# Patient Record
Sex: FEMALE | Race: WHITE
[De-identification: names, ages, dates, MRNs, and addresses within clinical notes are randomized per-mention and may not be internally consistent; named-entity substitution may affect disease eponyms.]

---

## 2017-03-12 ENCOUNTER — HOSPITAL ENCOUNTER (EMERGENCY)
Dept: HOSPITAL 75 - ER | Age: 77
Discharge: HOME | End: 2017-03-12
Payer: MEDICARE

## 2017-03-12 VITALS — HEIGHT: 62 IN | WEIGHT: 200 LBS | BODY MASS INDEX: 36.8 KG/M2

## 2017-03-12 VITALS — DIASTOLIC BLOOD PRESSURE: 79 MMHG | SYSTOLIC BLOOD PRESSURE: 167 MMHG

## 2017-03-12 DIAGNOSIS — S00.83XA: ICD-10-CM

## 2017-03-12 DIAGNOSIS — E66.9: ICD-10-CM

## 2017-03-12 DIAGNOSIS — S20.311A: Primary | ICD-10-CM
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
## 2017-03-12 DIAGNOSIS — Z95.1: ICD-10-CM

## 2017-03-12 DIAGNOSIS — K57.30: ICD-10-CM

## 2017-03-12 DIAGNOSIS — S20.312A: ICD-10-CM

## 2017-03-12 DIAGNOSIS — I25.2: ICD-10-CM

## 2017-03-12 DIAGNOSIS — K44.9: ICD-10-CM

## 2017-03-12 DIAGNOSIS — Z79.82: ICD-10-CM

## 2017-03-12 DIAGNOSIS — M47.812: ICD-10-CM

## 2017-03-12 DIAGNOSIS — Z79.899: ICD-10-CM

## 2017-03-12 DIAGNOSIS — I25.10: ICD-10-CM

## 2017-03-12 DIAGNOSIS — I10: ICD-10-CM

## 2017-03-12 LAB
ALBUMIN SERPL-MCNC: 3.9 G/DL (ref 3.2–4.5)
ALT SERPL-CCNC: 12 U/L (ref 0–55)
AMYLASE SERPL-CCNC: 36 U/L (ref 25–125)
ANION GAP SERPL CALC-SCNC: 9 MMOL/L (ref 5–14)
APTT BLD: 25 SEC (ref 24–35)
AST SERPL-CCNC: 20 U/L (ref 5–34)
BASOPHILS # BLD AUTO: 0 10^3/UL (ref 0–0.1)
BASOPHILS NFR BLD AUTO: 0 % (ref 0–10)
BILIRUB SERPL-MCNC: 0.3 MG/DL (ref 0.1–1)
BUN SERPL-MCNC: 25 MG/DL (ref 7–18)
BUN/CREAT SERPL: 22
CALCIUM SERPL-MCNC: 9.5 MG/DL (ref 8.5–10.1)
CHLORIDE SERPL-SCNC: 112 MMOL/L (ref 98–107)
CK SERPL-CCNC: 48 U/L (ref 29–168)
CO2 SERPL-SCNC: 21 MMOL/L (ref 21–32)
CREAT SERPL-MCNC: 1.14 MG/DL (ref 0.6–1.3)
EOSINOPHIL # BLD AUTO: 0.2 10^3/UL (ref 0–0.3)
EOSINOPHIL NFR BLD AUTO: 3 % (ref 0–10)
ERYTHROCYTE [DISTWIDTH] IN BLOOD BY AUTOMATED COUNT: 13.7 % (ref 10–14.5)
GFR SERPLBLD BASED ON 1.73 SQ M-ARVRAT: 46 ML/MIN
GLUCOSE SERPL-MCNC: 109 MG/DL (ref 70–105)
INR PPP: 1 (ref 0.8–1.4)
LYMPHOCYTES # BLD AUTO: 3 X 10^3 (ref 1–4)
LYMPHOCYTES NFR BLD AUTO: 42 % (ref 12–44)
MAGNESIUM SERPL-MCNC: 2.2 MG/DL (ref 1.8–2.4)
MCH RBC QN AUTO: 30 PG (ref 25–34)
MCHC RBC AUTO-ENTMCNC: 34 G/DL (ref 32–36)
MCV RBC AUTO: 90 FL (ref 80–99)
MONOCYTES # BLD AUTO: 0.5 X 10^3 (ref 0–1)
MONOCYTES NFR BLD AUTO: 8 % (ref 0–12)
NEUTROPHILS # BLD AUTO: 3.2 X 10^3 (ref 1.8–7.8)
NEUTROPHILS NFR BLD AUTO: 46 % (ref 42–75)
PLATELET # BLD: 170 10^3/UL (ref 130–400)
PMV BLD AUTO: 11.4 FL (ref 7.4–10.4)
POTASSIUM SERPL-SCNC: 4.3 MMOL/L (ref 3.6–5)
PROT SERPL-MCNC: 6.3 G/DL (ref 6.4–8.2)
PROTHROMBIN TIME: 13.1 SEC (ref 12.2–14.7)
RBC # BLD AUTO: 4.23 10^6/UL (ref 4.35–5.85)
SODIUM SERPL-SCNC: 142 MMOL/L (ref 135–145)
TROPONIN I SERPL-MCNC: < 0.3 NG/ML (ref ?–0.3)
WBC # BLD AUTO: 7 10^3/UL (ref 4.3–11)

## 2017-03-12 PROCEDURE — 74177 CT ABD & PELVIS W/CONTRAST: CPT

## 2017-03-12 PROCEDURE — 70486 CT MAXILLOFACIAL W/O DYE: CPT

## 2017-03-12 PROCEDURE — 71260 CT THORAX DX C+: CPT

## 2017-03-12 PROCEDURE — 96375 TX/PRO/DX INJ NEW DRUG ADDON: CPT

## 2017-03-12 PROCEDURE — 36415 COLL VENOUS BLD VENIPUNCTURE: CPT

## 2017-03-12 PROCEDURE — 82553 CREATINE MB FRACTION: CPT

## 2017-03-12 PROCEDURE — 84484 ASSAY OF TROPONIN QUANT: CPT

## 2017-03-12 PROCEDURE — 93041 RHYTHM ECG TRACING: CPT

## 2017-03-12 PROCEDURE — 80053 COMPREHEN METABOLIC PANEL: CPT

## 2017-03-12 PROCEDURE — 83735 ASSAY OF MAGNESIUM: CPT

## 2017-03-12 PROCEDURE — 73030 X-RAY EXAM OF SHOULDER: CPT

## 2017-03-12 PROCEDURE — 96361 HYDRATE IV INFUSION ADD-ON: CPT

## 2017-03-12 PROCEDURE — 96376 TX/PRO/DX INJ SAME DRUG ADON: CPT

## 2017-03-12 PROCEDURE — 96374 THER/PROPH/DIAG INJ IV PUSH: CPT

## 2017-03-12 PROCEDURE — 85730 THROMBOPLASTIN TIME PARTIAL: CPT

## 2017-03-12 PROCEDURE — 70450 CT HEAD/BRAIN W/O DYE: CPT

## 2017-03-12 PROCEDURE — 85025 COMPLETE CBC W/AUTO DIFF WBC: CPT

## 2017-03-12 PROCEDURE — 85610 PROTHROMBIN TIME: CPT

## 2017-03-12 PROCEDURE — 93005 ELECTROCARDIOGRAM TRACING: CPT

## 2017-03-12 PROCEDURE — 82550 ASSAY OF CK (CPK): CPT

## 2017-03-12 PROCEDURE — 82150 ASSAY OF AMYLASE: CPT

## 2017-03-12 PROCEDURE — 71010: CPT

## 2017-03-12 PROCEDURE — 72125 CT NECK SPINE W/O DYE: CPT

## 2017-03-12 NOTE — DIAGNOSTIC IMAGING REPORT
INDICATION: Fall with right shoulder pain.



DISCUSSION: Three views of the right shoulder were obtained, no

comparison. Moderate degenerative changes are noted within the

acromioclavicular joint. Mild degenerative changes are present

within the glenohumeral joint. Alignment is anatomic. No

fracture or dislocation. Soft tissues are unremarkable.



IMPRESSION:

1. No acute abnormality identified within the right shoulder.



Dictated by:



Dictated on workstation # [id number]

## 2017-03-12 NOTE — ED FALL/INJURY
General


Chief Complaint:  Trauma-Non Activation


Stated Complaint:  FALL/COLLAR BONE INJ/R SIDE PAIN


Nursing Triage Note:  


AMBULATED TO ROOM 07 ET STATES SHE TRIPPED IN HER BEDROOM CAUSING HER TO FALL.  


HIT HER CHEST ON THE DRESSER CORNER ET ALSO HIT HER CHIN ON THE DRESSER.


Source:  patient





History of Present Illness


Time seen by provider:  16:20


Initial Comments


PT ARRIVES VIA POV FROM HOME


PT STATES SHE TRIPPED AND FELL IN HER BEDROOM, AND HIT CHEST ON THE CORNER OF 

THE DRESSER, AND ALSO HIT CHIN ON DRESSER


OCCURRED TODAY AT 1430


NO LOSS OF CONSCIOUSNESS


NO DIZZINESS


NO VISION CHANGES


NO PARESTHESIAS OR MOTOR DEFICITS


NO NAUSEA//VOMITING


NO ABDOMINAL PAIN 


CHEST HURTS TO MOVE


NO SHORTNESS OF BREATH


NO NECK OR BACK PAIN 


NO DENTAL/MOUTH PAIN 


C/O CHIN, CHEST AND RIGHT SHOULDER PAIN





PCP: DR. HUMPHRIES--SEEN LAST WEEK FOR ROUTINE EXAM AND RIGHT BREAST PAIN X 1 

MONTH--MAMMOGRAM NEXT WEDNESDAY





Allergies and Home Medications


Allergies


Coded Allergies:  


     quinine (Verified  Allergy, Unknown, 9/12/06)





Home Medications


Acetaminophen 500 Mg Tablet 500 MG PO HS (Reported) 


Aspirin 325 Mg Tablet 81 MG PO DAILY (Reported) 


Ca Cmb No.1/Vit D3/B-6/Fa/B12 1 Each Tablet 1 EACH PO DAILY (Reported) 


Melatonin/Pyridoxine 1 Each Tablet 1 EACH PO HS (Reported) 


Metoprolol Succinate/Hctz 1 Each Tab.er.24h 1 EACH PO DAILY (Reported) 


Multivitamins 1 Tab Tablet 1 TAB PO DAILY (Reported) 


Omega 3 Polyunsat Fatty Acids 1,000 Mg Cap 1 TAB PO DAILY (Reported) 


Omeprazole 20 Mg Capsule.dr 40 MG PO DAILY (Reported) 


Simvastatin 40 Mg Tablet 1 TAB PO HS (Reported) 


Tramadol HCl 50 Mg Tablet #20 50 MG PO Q4H 


   Prescribed by: CLAUDETTE RIDER on 3/12/17 1824





Constitutional:   no symptoms reported


Eyes:   No Symptoms Reported


Ears, Nose, Mouth, Throat:   no symptoms reported


Respiratory:   no symptoms reported


Cardiovascular:   see HPI


Gastrointestinal:   no symptoms reported


Genitourinary:   no symptoms reported


Musculoskeletal:   see HPI


Skin:   other (ABRASION TO CHEST)


Psychiatric/Neurological:   No Symptoms Reported





Past Medical-Social-Family Hx


Patient Social History


Alcohol Use:  Denies Use


Recreational Drug Use:  No


Smoking Status:  Never a Smoker


Recent Foreign Travel:  No


Contact w/Someone Who Travel:  No


Recent Infectious Disease Expo:  No


Recent Hopitalizations:  No





Immunizations Up To Date


Date of Pneumonia Vaccine:  Dec 6, 2010


Date of Influenza Vaccine:  Oct 10, 2014





Surgeries


HX Surgeries:  Yes (HIATAL HERNIA REPAIR, RIGHT KNEE REPLACEMENT, COLONOSCOPY; 

CABG 2007; HYST/BSO)


Surgeries:  Abdominal, Appendectomy, Cardiac, CABG, Gallbladder, Hysterectomy, 

Joint Replacement, Orthopedic





Respiratory


Hx Respiratory Disorders:  No





Cardiovascular


Hx Cardiac Disorders:  Yes


Cardiac Disorders:  Coronary Artery Disease, Heart Attack, High Cholesterol, 

Hypertension, Peripheral Vascular





Neurological


Hx Neurological Disorders:  No





Reproductive System


Hx Reproductive Disorders:  No


Sexually Transmitted Disease:  No


GYN History:  Hysterectomy, Menopausal





Genitourinary


Hx Genitourinary Disorders:  Yes


Genitourinary Disorders:  UTI-Chronic





Gastrointestinal


Hx Gastrointestinal Disorders:  Yes (SPASTIC COLON/HIATAL HERNIA/GI BLEED-ULCERS

)


Gastrointestinal Disorders:  Gastroesophageal Reflux, Gastrointestinal Bleed, 

Hiatal Hernia, Ulcer





Musculoskeletal


Hx Musculoskeletal Disorders:  Yes (RIGHT KNEE REPLACEMENT)


Musculoskeletal Disorders:  Arthritis





Endocrine


Hx Endocrine Disorders:  Yes ("DIET CONTROLLED", OBESITY)


Endocrine Disorders:  Diabetes, Non-Insulin dep





HEENT


HX ENT Disorders:  No





Cancer


Hx Cancer:  No





Psychosocial


Hx Psychiatric Problems:  Yes


Behavioral Health Disorders:  Anxiety





Integumentary


HX Skin/Integumentary Disorder:  No





Blood Transfusions


Hx Blood Disorders:  No





Physical Exam


Vital Signs





 Vital Sign - Last 12Hours








 3/12/17





 16:09


 


Temp 98.0


 


Pulse 65


 


Resp 16


 


B/P 173/87


 


Pulse Ox 98





Capillary Refill : Less Than 3 Seconds


General Appearance:   WD/WN


HEENT:   PERRL/EOMI other (BRUISING AND TENDERNESS TO CHIN. NO TRISMUS. NO 

INTRAORAL INJURY)


Neck:   other (PLACED IN C-COLLAR ON ARRIVAL)


Cardiovascular:   regular rate, rhythm no murmur


Respiratory:   normal breath sounds no respiratory distress no accessory muscle 

use other (ABRASION AND BRUSING TO CENTER OF UPPER CHEST WITH TENDERNESS)


Gastrointestinal:   normal bowel sounds non tender soft no organomegaly


Extremities:   no pedal edema no calf tenderness normal capillary refill other (

TENDERNESS TO ANTERIOR RIGHT SHOULDER. )


Neurologic/Psychiatric:   CNs II-XII nml as tested no motor/sensory deficits 

alert normal mood/affect oriented x 3


Skin:   normal color warm/dry





Chidi Coma Score


Best Eye Response:  (4) Open Spontaneously


Best Verbal Response:  (5) Oriented


Best Motor Response:  (6) Obeys Commands


Kingsley Total:  15





Splinting and Joint Reduction :  


   Cervical Collars:  Nemo-Adult





Progress/Results/Core Measures


Results/Orders


Lab Results





 Laboratory Tests








Test


  3/12/17


16:48 Range/Units


 


 


Activated Partial


Thromboplast Time 25 


  24-35  SEC


 


 


Alanine Aminotransferase


(ALT/SGPT) 12 


  0-55  U/L


 


 


Albumin 3.9  3.2-4.5  G/DL


 


Alkaline Phosphatase 69    U/L


 


Amylase Level 36    U/L


 


Anion Gap 9  5-14  MMOL/L


 


Aspartate Amino Transf


(AST/SGOT) 20 


  5-34  U/L


 


 


BUN/Creatinine Ratio 22   


 


Basophils # (Auto)


  0.0 


  0.0-0.1


10^3/uL


 


Basophils (%) (Auto) 0  0-10  %


 


Blood Urea Nitrogen 25 H 7-18  MG/DL


 


Calcium Level 9.5  8.5-10.1  MG/DL


 


Carbon Dioxide Level 21  21-32  MMOL/L


 


Chloride Level 112 H   MMOL/L


 


Creatine Kinase MB 1.3  <6.6  NG/ML


 


Creatinine


  1.14 


  0.60-1.30


MG/DL


 


Eosinophils # (Auto)


  0.2 


  0.0-0.3


10^3/uL


 


Eosinophils (%) (Auto) 3  0-10  %


 


Estimat Glomerular Filtration


Rate 46 


   


 


 


Glucose Level 109 H   MG/DL


 


Hematocrit 38  35-52  %


 


Hemoglobin 12.8  11.5-16.0  G/DL


 


INR Comment 1.0  0.8-1.4  


 


Lymphocytes # (Auto) 3.0  1.0-4.0  X 10^3


 


Lymphocytes (%) (Auto) 42  12-44  %


 


Magnesium Level 2.2  1.8-2.4  MG/DL


 


Mean Corpuscular Hemoglobin 30  25-34  PG


 


Mean Corpuscular Hemoglobin


Concent 34 


  32-36  G/DL


 


 


Mean Corpuscular Volume 90  80-99  FL


 


Mean Platelet Volume 11.4 H 7.4-10.4  FL


 


Monocytes # (Auto) 0.5  0.0-1.0  X 10^3


 


Monocytes (%) (Auto) 8  0-12  %


 


Neutrophils # (Auto) 3.2  1.8-7.8  X 10^3


 


Neutrophils (%) (Auto) 46  42-75  %


 


Platelet Count


  170 


  130-400


10^3/uL


 


Potassium Level 4.3  3.6-5.0  MMOL/L


 


Prothrombin Time 13.1  12.2-14.7  SEC


 


Red Blood Count


  4.23 L


  4.35-5.85


10^6/uL


 


Red Cell Distribution Width 13.7  10.0-14.5  %


 


Sodium Level 142  135-145  MMOL/L


 


Total Bilirubin 0.3  0.1-1.0  MG/DL


 


Total Creatine Kinase 48    U/L


 


Total Protein 6.3 L 6.4-8.2  G/DL


 


Troponin I < 0.30  <0.30  NG/ML


 


White Blood Count


  7.0 


  4.3-11.0


10^3/uL








My Orders





 Orders-CLAUDETTE RIDER DO


Saline Lock/Iv-Start (3/12/17 16:22)


Ekg Tracing (3/12/17 16:22)


Monitor-Rhythm Ecg Trace Only (3/12/17 16:22)


Ct Head/Face/Cervical Wo (3/12/17 16:22)


Amylase (3/12/17 16:22)


Cbc With Automated Diff (3/12/17 16:22)


Comprehensive Metabolic Panel (3/12/17 16:22)


Creatine Kinase (3/12/17 16:22)


Creatine Kinase Mb (3/12/17 16:22)


Magnesium (3/12/17 16:22)


Protime With Inr (3/12/17 16:22)


Partial Thromboplastin Time (3/12/17 16:22)


Troponin I (3/12/17 16:22)


Cervical Collar (3/12/17 16:22)


Chest 1 View, Ap/Pa Only (3/12/17 16:22)


Ct Chest/Abdomen/Pelvis W (3/12/17 16:22)


Fentanyl  Injection (Sublimaze Injection (3/12/17 16:31)


Shoulder, Right, 3 Views (3/12/17 16:33)


Iohexol Injection (Omnipaque 350 Mg/Ml 1 (3/12/17 17:15)


Ns (Ivpb) (Sodium Chloride 0.9% Ivpb Bag (3/12/17 17:15)


Saline Lock/Iv-Start (3/12/17 17:56)


Ns Iv 1000 Ml (Sodium Chloride 0.9%) (3/12/17 17:56)


Ketorolac Injection (Toradol Injection) (3/12/17 18:30)


Fentanyl  Injection (Sublimaze Injection (3/12/17 18:21)





Medications Given in ED





 Current Medications








 Medications  Dose


 Ordered  Sig/Geraldine


 Route  Start Time


 Stop Time Status Last Admin


Dose Admin


 


 Iohexol  100 ml  ONCE  ONCE


 IV  3/12/17 17:15


 3/12/17 17:16 DC 3/12/17 17:36


75 ML


 


 Sodium Chloride  1,000 ml @ 


 0 mls/hr  Q0M ONCE


 IV  3/12/17 17:56


 3/12/17 17:57 DC 3/12/17 18:25


1,000 MLS/HR


 


 Sodium Chloride


 100 ml  100 ml  ONCE  ONCE


 IV  3/12/17 17:15


 3/12/17 17:16 DC 3/12/17 17:36


80 ML








Vital Signs/I&O





 Vital Sign - Last 12Hours








 3/12/17





 16:09


 


Temp 98.0


 


Pulse 65


 


Resp 16


 


B/P 173/87


 


Pulse Ox 98








Blood Pressure Mean:  115


Progress Note :  


Progress Note


1820--CERVICAL COLLAR REMOVED AFTER CT RESULTS OBTAINED. PT DOES NOT C/O NECK 

PAIN AND NECK IS NON-TENDER





ECG


Initial ECG Impression Time:  15:15


Initial ECG Rate:  64


Initial ECG Rhythm:  Normal Sinus


Initial ECG Impression:  Normal


Initial ECG Comparisson:  Unchanged





Diagnostic Imaging





Comments


CT HEAD/MAXILLOFACIALS/CERVICAL SPINE--NO ACUTE PROCESS, CHRONIC CHANGES


CT CHEST/ABDOMEN/PELVIS--NO ACUTE PROCESS


CXR--NO ACUTE PROCESS


XRAYS RIGHT SHOULDER--NO ACUTE PROCESS


ALL PER RADIOLOGIST REPORTS @ 1812


   Reviewed:  Reviewed by Me





Departure


Impression


Impression:  


 Primary Impression:  


 ANTERIOR CHEST CONTUSION


 Additional Impressions:  


 Chin contusion


 Status post fall


Disposition:  01 HOME, SELF-CARE


Condition:  Stable





Departure-Patient Inst.


Referrals:  


MERNA HUMPHRIES DO (PCP/Family)


Primary Care Physician


Patient Instructions:  CHEST CONTUSION, Contusion (DC), Preventing Falls in the 

Older Adult





Add. Discharge Instructions:


ICE TO SORE AREAS AT 20 MINUTE INTERVALS





ACTIVITIES AS TOLERATED





TYLENOL 1 GRAM /MOTRIN 800 MG 4 TIMES A DAY FOR PAIN





FOLLOW UP WITH YOUR DR IN 1 WEEK IF NO BETTER





All discharge instructions reviewed with patient and/or family. Voiced 

understanding.


Scripts


Tramadol HCl (Ultram)50 Mg Affdws45 Mg PO Q4H #20 TAB


   Prov:CLAUDETTE RIDER DO         3/12/17








CLAUDETTE RIDER DO Mar 12, 2017 16:37

## 2017-03-12 NOTE — DIAGNOSTIC IMAGING REPORT
Procedure: CT chest, abdomen, and pelvis with contrast.



Technique: Multiple contiguous axial images were obtained

through the chest, abdomen, and pelvis after the administration

of intravenous contrast.



Indication: Fall with generalized body pain.



Comparison: None.



Discussion: Subsegmental atelectasis is present within the

bilateral lung bases. No focal consolidation or pulmonary

nodule. No pneumothorax. Normal heart size. No pleural or

pericardial fluid. The thoracic aorta is normal in caliber and

configuration. The pulmonary arteries are normal in caliber.

Median sternotomy is present. No mediastinal, hilar or axillary

adenopathy. No acute osseous abnormality is identified.



Small hiatal hernia. The gallbladder is likely surgically

absent. The liver, pancreas, spleen, adrenal glands, kidneys and

urinary bladder are unremarkable. Uterus is surgically absent.

Mild diverticulosis with no secondary evidence for

diverticulitis. Mild constipation. No obstruction, pneumatosis

or pneumoperitoneum. The abdominal aorta is normal in caliber.

No ascites or pathologically enlarged lymph nodes are

identified. No acute osseous abnormality identified.



Impression:

1. No acute abnormality identified within the chest.

2. Mild constipation.

3. Mild diverticulosis.



Dictated by:



Dictated on workstation # [id number]

## 2017-03-12 NOTE — XMS REPORT
[facility]

 Created on: 2016



Leilani Ramírez

External Reference #: [id number]

: 1940

Sex: Female



Demographics







 Address  [address] [place]  [address]

 

 Home Phone  [phone]

 

 Preferred Language  Unknown

 

 Marital Status  Unknown

 

 [employer] Affiliation  Unknown

 

 Race  White

 

 Ethnic Group  Not  or 





Author







 ROOPA Robles

 

 [facility]  [employer]

 

 Address  Unknown

 

 Phone  Unavailable







Care Team Providers







 Care Team Member Name  Role  Phone

 

 ROOPA HEMPHILL  CP  Unavailable



                                                                



Allergies

          No Known Allergies                                                   
                                     



Problems

          No Known Problems                                                    
                                    



Medications

          No Known Medications                                                 
                             



Results

          No Known Results                                                     
               



Summary Purpose

          eClinical[employer] Submission

## 2017-03-12 NOTE — DIAGNOSTIC IMAGING REPORT
Procedure: CT head, face, and cervical spine without contrast.



Technique: Multiple contiguous axial images were obtained

through the head, neck, and facial bones without the use of

intravenous contrast. Sagittal and coronal reformations through

the cervical spine and facial bones were also performed.



Indication: Patient fell and hit chin on a dresser, head and

neck pain with bruising on the chin.



Comparison: 9/19/2013.



Discussion: Chronic appearing lacunar infarct within the right

basal ganglia. No acute intracranial hemorrhage, mass, midline

shift or hydrocephalus. The ventricles and sulci are normal size

and configuration for age. The patient is edentulous. No acute

facial fracture identified. The visualized orbits, paranasal

sinuses, mastoid air cells and calvarium are unremarkable.



Moderate degenerative changes are noted diffusely throughout the

cervical spine. No acute fracture or subluxation. The visualized

paraspinal soft tissues are unremarkable.



Impression:

1. Senescent intracranial changes, as described. No acute

intracranial abnormality identified.

2. No acute facial fracture identified.

3. Degenerative changes of the cervical spine. No acute fracture

identified.



Dictated by:



Dictated on workstation # [id number]

## 2017-03-24 ENCOUNTER — HOSPITAL ENCOUNTER (OUTPATIENT)
Dept: HOSPITAL 75 - RAD | Age: 77
End: 2017-03-24
Attending: FAMILY MEDICINE
Payer: MEDICARE

## 2017-03-24 DIAGNOSIS — N64.4: Primary | ICD-10-CM

## 2017-03-24 PROCEDURE — 77066 DX MAMMO INCL CAD BI: CPT

## 2017-03-24 NOTE — DIAGNOSTIC IMAGING REPORT
EXAMINATION: Bilateral breast digital diagnostic mammogram with

CAD.



The current study was also evaluated with a Computer Aided

Detection (CAD) system.



INDICATION: Bilateral medial breast pain.



COMPARISON: 4/15/13.



FINDINGS: The breasts are composed of scattered fibroglandular

densities. There are benign-appearing calcifications seen.



No mass, architectural distortion or suspicious calcification

seen.



IMPRESSION: No mammographic evidence of malignancy. Ultrasound

evaluation pending.



ACR BI-RADS Category 0: Incomplete. (Needs additional imaging

evaluation).

Result letter will be mailed to the patient.

Note: At least 10% of breast cancer is not imaged by mammography.



Dictated by:



Dictated on workstation # [id number]

## 2017-03-24 NOTE — DIAGNOSTIC IMAGING REPORT
EXAMINATION: Bilateral breast ultrasound.



INDICATION: Bilateral breast pain in the medial aspect of each

breast.



FINDINGS: The four quadrants and retroareolar region of each

breast were scanned with no abnormality seen.



IMPRESSION: Negative study. Clinical followup of areas of pain

recommended.



ACR BI-RADS Category 1: Negative.

Result letter will be mailed to the patient.

Note:  At least 10% of breast cancer is not imaged by

mammography.



Dictated by:



Dictated on workstation # [id number]

## 2017-08-10 ENCOUNTER — HOSPITAL ENCOUNTER (OUTPATIENT)
Dept: HOSPITAL 75 - REHAB | Age: 77
Discharge: HOME | End: 2017-08-10
Attending: FAMILY MEDICINE
Payer: MEDICARE

## 2017-08-10 DIAGNOSIS — M51.36: Primary | ICD-10-CM

## 2018-05-05 ENCOUNTER — HOSPITAL ENCOUNTER (OUTPATIENT)
Dept: HOSPITAL 75 - RAD | Age: 78
End: 2018-05-05
Attending: FAMILY MEDICINE
Payer: MEDICARE

## 2018-05-05 DIAGNOSIS — M50.322: ICD-10-CM

## 2018-05-05 DIAGNOSIS — M48.02: Primary | ICD-10-CM

## 2018-05-05 DIAGNOSIS — M99.71: ICD-10-CM

## 2018-05-05 PROCEDURE — 72141 MRI NECK SPINE W/O DYE: CPT

## 2018-05-05 NOTE — DIAGNOSTIC IMAGING REPORT
PROCEDURE: MR imaging cervical spine without contrast.



TECHNIQUE: Multiplanar, multisequence MR imaging of the cervical

spine was performed without contrast.



INDICATION: Neck pain with right arm radiculopathy.



COMPARISON: None available.



FINDINGS: Normal alignment of the cervical spine. No fracture or

marrow replacing process. No features of active facet synovitis.

The cervical cord is normal in size and signal. The cerebellar

tonsils are normal in position. No cervical lymphadenopathy.



C2-C3: No spinal stenosis or foraminal narrowing.



C3-C4: No spinal stenosis. Left uncovertebral joint hypertrophy

results in moderate to severe left foraminal narrowing.



C4-C5: No spinal stenosis. Right uncovertebral joint hypertrophy

and facet osteoarthritis results in moderate right foraminal

narrowing.



C5-C6: Central and bilateral paracentral disc osteophyte complex

with ligamentum flavum thickening results in mild spinal stenosis

without mass effect on the cord. Moderate left and mild right

foraminal narrowing due to facet and uncovertebral joint

hypertrophy.



C6-C7: Central and bilateral paracentral disc osteophyte complex

results in minimal spinal stenosis. No significant foraminal

narrowing.



C7-T1: No spinal stenosis or foraminal narrowing.



IMPRESSION:

1. Mild degenerative disc disease and posterior element

hypertrophy at C5-C6 causes mild spinal stenosis. No additional

foci of significant spinal canal narrowing.

2. Varying degrees of neuroforaminal narrowing are present and

greatest on the left at C3-C4 with severe stenosis.



Dictated by: 



  Dictated on workstation # [id number]

## 2018-07-02 ENCOUNTER — HOSPITAL ENCOUNTER (OUTPATIENT)
Dept: HOSPITAL 75 - REHAB | Age: 78
Discharge: HOME | End: 2018-07-02
Attending: NURSE PRACTITIONER
Payer: MEDICARE

## 2018-07-02 DIAGNOSIS — M19.90: Primary | ICD-10-CM

## 2018-07-02 DIAGNOSIS — M48.00: ICD-10-CM

## 2018-09-14 ENCOUNTER — HOSPITAL ENCOUNTER (EMERGENCY)
Dept: HOSPITAL 75 - ER | Age: 78
Discharge: HOME | End: 2018-09-14
Payer: MEDICARE

## 2018-09-14 VITALS — SYSTOLIC BLOOD PRESSURE: 158 MMHG | DIASTOLIC BLOOD PRESSURE: 67 MMHG

## 2018-09-14 VITALS — BODY MASS INDEX: 38.64 KG/M2 | WEIGHT: 210 LBS | HEIGHT: 62 IN

## 2018-09-14 DIAGNOSIS — Z98.890: ICD-10-CM

## 2018-09-14 DIAGNOSIS — E66.9: ICD-10-CM

## 2018-09-14 DIAGNOSIS — Z87.440: ICD-10-CM

## 2018-09-14 DIAGNOSIS — Z95.1: ICD-10-CM

## 2018-09-14 DIAGNOSIS — Z79.82: ICD-10-CM

## 2018-09-14 DIAGNOSIS — F41.9: ICD-10-CM

## 2018-09-14 DIAGNOSIS — I25.10: ICD-10-CM

## 2018-09-14 DIAGNOSIS — Z87.19: ICD-10-CM

## 2018-09-14 DIAGNOSIS — N39.0: ICD-10-CM

## 2018-09-14 DIAGNOSIS — K29.00: Primary | ICD-10-CM

## 2018-09-14 DIAGNOSIS — Z90.49: ICD-10-CM

## 2018-09-14 DIAGNOSIS — Z90.89: ICD-10-CM

## 2018-09-14 DIAGNOSIS — I10: ICD-10-CM

## 2018-09-14 DIAGNOSIS — I73.9: ICD-10-CM

## 2018-09-14 DIAGNOSIS — Z90.710: ICD-10-CM

## 2018-09-14 DIAGNOSIS — Z86.010: ICD-10-CM

## 2018-09-14 DIAGNOSIS — I25.2: ICD-10-CM

## 2018-09-14 DIAGNOSIS — E11.51: ICD-10-CM

## 2018-09-14 DIAGNOSIS — Z88.8: ICD-10-CM

## 2018-09-14 DIAGNOSIS — K21.9: ICD-10-CM

## 2018-09-14 DIAGNOSIS — E78.00: ICD-10-CM

## 2018-09-14 LAB
ALBUMIN SERPL-MCNC: 4.1 GM/DL (ref 3.2–4.5)
ALP SERPL-CCNC: 70 U/L (ref 40–136)
ALT SERPL-CCNC: 10 U/L (ref 0–55)
APTT PPP: YELLOW S
BACTERIA #/AREA URNS HPF: (no result) /HPF
BASOPHILS # BLD AUTO: 0 10^3/UL (ref 0–0.1)
BASOPHILS NFR BLD AUTO: 1 % (ref 0–10)
BILIRUB SERPL-MCNC: 0.4 MG/DL (ref 0.1–1)
BILIRUB UR QL STRIP: NEGATIVE
BUN/CREAT SERPL: 29
CALCIUM SERPL-MCNC: 9.8 MG/DL (ref 8.5–10.1)
CHLORIDE SERPL-SCNC: 107 MMOL/L (ref 98–107)
CO2 SERPL-SCNC: 20 MMOL/L (ref 21–32)
CREAT SERPL-MCNC: 0.9 MG/DL (ref 0.6–1.3)
EOSINOPHIL # BLD AUTO: 0.3 10^3/UL (ref 0–0.3)
EOSINOPHIL NFR BLD AUTO: 4 % (ref 0–10)
ERYTHROCYTE [DISTWIDTH] IN BLOOD BY AUTOMATED COUNT: 13 % (ref 10–14.5)
FIBRINOGEN PPP-MCNC: CLEAR MG/DL
GFR SERPLBLD BASED ON 1.73 SQ M-ARVRAT: 60 ML/MIN
GLUCOSE SERPL-MCNC: 73 MG/DL (ref 70–105)
GLUCOSE UR STRIP-MCNC: NEGATIVE MG/DL
HCT VFR BLD CALC: 38 % (ref 35–52)
HGB BLD-MCNC: 12.9 G/DL (ref 11.5–16)
KETONES UR QL STRIP: NEGATIVE
LEUKOCYTE ESTERASE UR QL STRIP: (no result)
LIPASE SERPL-CCNC: 51 U/L (ref 8–78)
LYMPHOCYTES # BLD AUTO: 3.5 X 10^3 (ref 1–4)
LYMPHOCYTES NFR BLD AUTO: 48 % (ref 12–44)
MANUAL DIFFERENTIAL PERFORMED BLD QL: NO
MCH RBC QN AUTO: 31 PG (ref 25–34)
MCHC RBC AUTO-ENTMCNC: 34 G/DL (ref 32–36)
MCV RBC AUTO: 91 FL (ref 80–99)
MONOCYTES # BLD AUTO: 0.7 X 10^3 (ref 0–1)
MONOCYTES NFR BLD AUTO: 9 % (ref 0–12)
NEUTROPHILS # BLD AUTO: 2.8 X 10^3 (ref 1.8–7.8)
NEUTROPHILS NFR BLD AUTO: 38 % (ref 42–75)
NITRITE UR QL STRIP: POSITIVE
PH UR STRIP: 6.5 [PH] (ref 5–9)
PLATELET # BLD: 164 10^3/UL (ref 130–400)
PMV BLD AUTO: 10.3 FL (ref 7.4–10.4)
POTASSIUM SERPL-SCNC: 3.8 MMOL/L (ref 3.6–5)
PROT SERPL-MCNC: 6.9 GM/DL (ref 6.4–8.2)
PROT UR QL STRIP: NEGATIVE
RBC # BLD AUTO: 4.16 10^6/UL (ref 4.35–5.85)
RBC #/AREA URNS HPF: (no result) /HPF
SODIUM SERPL-SCNC: 139 MMOL/L (ref 135–145)
SP GR UR STRIP: 1.01 (ref 1.02–1.02)
UROBILINOGEN UR-MCNC: NORMAL MG/DL
WBC # BLD AUTO: 7.2 10^3/UL (ref 4.3–11)
WBC #/AREA URNS HPF: (no result) /HPF

## 2018-09-14 PROCEDURE — 83690 ASSAY OF LIPASE: CPT

## 2018-09-14 PROCEDURE — 87186 SC STD MICRODIL/AGAR DIL: CPT

## 2018-09-14 PROCEDURE — 85025 COMPLETE CBC W/AUTO DIFF WBC: CPT

## 2018-09-14 PROCEDURE — 81000 URINALYSIS NONAUTO W/SCOPE: CPT

## 2018-09-14 PROCEDURE — 80053 COMPREHEN METABOLIC PANEL: CPT

## 2018-09-14 PROCEDURE — 87088 URINE BACTERIA CULTURE: CPT

## 2018-09-14 PROCEDURE — 36415 COLL VENOUS BLD VENIPUNCTURE: CPT

## 2018-09-14 PROCEDURE — 96374 THER/PROPH/DIAG INJ IV PUSH: CPT

## 2018-09-14 PROCEDURE — 87077 CULTURE AEROBIC IDENTIFY: CPT

## 2018-09-14 NOTE — ED ABDOMINAL PAIN
General


Chief Complaint:  Abdominal/GI Problems


Stated Complaint:  NOT FEELING WELL/ABD PAIN


Nursing Triage Note:  


Pt arrives to the ED c/o ABD Pain and "not feeling well".  Pt is +Nausea 


-Vomitting.  Last BM: 09/14.


Sepsis Screen:  No Definite Risk


Source of Information:  Patient, Family


Exam Limitations:  No Limitations


 (CAITIE ÁLVAREZ MD)





History of Present Illness


Date Seen by Provider:  Sep 14, 2018


Time Seen by Provider:  16:45


Initial Comments


This 78-year-old woman presents to the emergency room with complaints of 

worsening abdominal pain and nausea for the past one month.  She describes a 

burning sensation in the epigastric region.  She has been trying Tums, Gas-X, 

active the small, and proton pump inhibitors without significant relief.  She 

had previously been on omeprazole and was recently changed to Protonix.  She 

describes pain particularly after eating.  Sometimes this happens upon the end 

of her swallow and sometimes it is delayed.  She has frequent belching.  She is 

status post cholecystectomy.  She has had an EGD and colonoscopy in the 2015 

with Dr. Huizar which demonstrated a gastric polyp, diverticulosis, and right 

colon inflammation.  She also has a history of hiatal hernia status post repair 

by Dr. Cash.  She is currently wearing a cardiac monitor prescribed by 

Dr. Clements, her cardiologist.  Her primary care provider is Dr. Huizar.  She has 

had some nausea without vomiting.  She states it is very difficult for her to 

vomit since the hiatal hernia repair.  Patient also describes generally feeling 

shaky and weak, especially when getting up.


 (CAITIE ÁLVAREZ MD)





Allergies and Home Medications


Allergies


Coded Allergies:  


     quinine (Verified  Allergy, Unknown, 9/12/06)





Home Medications


Acetaminophen 500 Mg Tablet, 500 MG PO HS, (Reported)


Aspirin 325 Mg Tablet, 81 MG PO DAILY, (Reported)


Ca Cmb No.1/Vit D3/B-6/Fa/B12 1 Each Tablet, 1 EACH PO DAILY, (Reported)


Melatonin/Pyridoxine 1 Each Tablet, 1 EACH PO HS, (Reported)


Metoprolol Succinate/Hctz 1 Each Tab.er.24h, 1 EACH PO DAILY, (Reported)


Multivitamins 1 Tab Tablet, 1 TAB PO DAILY, (Reported)


Nitrofurantoin Macrocrystal 100 Mg Capsule, 100 MG PO BID


   Prescribed by: LAW RICO on 9/14/18 1959


Omega 3 Polyunsat Fatty Acids 1,000 Mg Cap, 1 TAB PO DAILY, (Reported)


Omeprazole 20 Mg Capsule.dr, 40 MG PO DAILY, (Reported)


Simvastatin 40 Mg Tablet, 1 TAB PO HS, (Reported)


Sucralfate 1 Gm Tablet, 1 GM PO QIDACHS


   Prescribed by: LAW RICO on 9/14/18 1935


Tramadol HCl 50 Mg Tablet, 50 MG PO Q4H


   Prescribed by: CLAUDETTE RIDER on 3/12/17 1824





Patient Home Medication List


Home Medication List Reviewed:  Yes


 (CAITEI ÁLVAREZ MD)


Home Medication List Reviewed:  Yes


 (LAW RICO)





Review of Systems


Review of Systems


Constitutional:  see HPI


EENTM:  No Symptoms Reported


Respiratory:  No Symptoms Reported


Cardiovascular:  See HPI


Gastrointestinal:  See HPI


Genitourinary:  No Symptoms Reported


Musculoskeletal:  no symptoms reported


Skin:  no symptoms reported


Psychiatric/Neurological:  See HPI


Endocrine:  No Symptoms Reported


Hematologic/Lymphatic:  No Symptoms Reported (CAITIE ÁLVAREZ MD)





Past Medical-Social-Family Hx


Past Med/Social Hx:  Reviewed and Corrections made


 (CAITIE ÁLVAREZ MD)





Patient Social History


Alcohol Use:  Denies Use


Recreational Drug Use:  No


Smoking Status:  Never a Smoker


2nd Hand Smoke Exposure:  No


Recent Foreign Travel:  No


Contact w/Someone Who Travel:  No


Recent Infectious Disease Expo:  No


Recent Hopitalizations:  No


Physical Abuse:  No


Sexual Abuse:  No


Mistreated:  No


Fear:  No


 (CAITIE ÁLVAREZ MD)





Immunizations Up To Date


Date of Pneumonia Vaccine:  Dec 6, 2010


Date of Influenza Vaccine:  Oct 10, 2014


 (CAITIE ÁLVAREZ MD)





Past Medical History


Surgeries:  Yes


Abdominal (hiatal hernia repair), Appendectomy, Cardiac, CABG, Gallbladder, 

Hysterectomy, Joint Replacement, Orthopedic


Respiratory:  No


Cardiac:  Yes


Coronary Artery Disease, Heart Attack, High Cholesterol, Hypertension, 

Peripheral Vascular


Neurological:  No


Reproductive Disorders:  No


GYN History:  Hysterectomy, Menopausal


Sexually Transmitted Disease:  No


UTI-Chronic


Gastrointestinal:  Yes (SPASTIC COLON/HIATAL HERNIA/GI BLEED-ULCERS, gastric 

polyp)


Gastroesophageal Reflux, Gastrointestinal Bleed, Diverticulosis, Hiatal Hernia, 

Ulcer


Musculoskeletal:  Yes (RIGHT KNEE REPLACEMENT)


Arthritis


Endocrine:  Yes ("DIET CONTROLLED", OBESITY)


Diabetes, Non-Insulin dep


HEENT:  No


Cancer:  No


Psychosocial:  Yes


Anxiety


Integumentary:  No


Blood Disorders:  No


 (CAITIE ÁLVAREZ MD)





Physical Exam


Vital Signs





Vital Signs - First Documented








 9/14/18





 16:35


 


Temp 98.2


 


Pulse 68


 


Resp 14


 


B/P (MAP) 193/83 (119)


 


Pulse Ox 96





 (TRISHA,LAW J)


Vital Signs


Capillary Refill : Less Than 3 Seconds 


 (CAITIE ÁLVAREZ MD)


Height/Weight/BMI


Height: 5'2.00"


Weight: 210lbs. 6.0oz. [id number];  BMI


Method:Stated


General Appearance:  WD/WN, no apparent distress


HEENT:  PERRL/EOMI, normal ENT inspection


Neck:  normal inspection


Respiratory:  chest non-tender, lungs clear, normal breath sounds, no 

respiratory distress, no accessory muscle use


Cardiovascular:  regular rate, rhythm, no edema, no murmur


Gastrointestinal:  normal bowel sounds, soft, tenderness (epigastric)


Extremities:  normal inspection


Neurologic/Psychiatric:  CNs II-XII nml as tested, no motor/sensory deficits, 

alert, normal mood/affect, oriented x 3


Skin:  normal color, warm/dry (CAITIE ÁLVAREZ MD)





Progress/Results/Core Measures


Results/Orders


Lab Results





Laboratory Tests








Test


 9/14/18


16:28 9/14/18


18:04 Range/Units


 


 


Urine Color YELLOW    


 


Urine Clarity CLEAR    


 


Urine pH 6.5   5-9  


 


Urine Specific Gravity 1.010 L  1.016-1.022  


 


Urine Protein NEGATIVE   NEGATIVE  


 


Urine Glucose (UA) NEGATIVE   NEGATIVE  


 


Urine Ketones NEGATIVE   NEGATIVE  


 


Urine Nitrite POSITIVE H  NEGATIVE  


 


Urine Bilirubin NEGATIVE   NEGATIVE  


 


Urine Urobilinogen NORMAL   NORMAL  MG/DL


 


Urine Leukocyte Esterase 2+ H  NEGATIVE  


 


Urine RBC (Auto) NEGATIVE   NEGATIVE  


 


Urine RBC NONE    /HPF


 


Urine WBC 25-50 H   /HPF


 


Urine Crystals NONE    /LPF


 


Urine Bacteria LARGE H   /HPF


 


Urine Casts NONE    /LPF


 


Urine Mucus NEGATIVE    /LPF


 


Urine Culture Indicated YES    


 


White Blood Count


 


 7.2 


 4.3-11.0


10^3/uL


 


Red Blood Count


 


 4.16 L


 4.35-5.85


10^6/uL


 


Hemoglobin  12.9  11.5-16.0  G/DL


 


Hematocrit  38  35-52  %


 


Mean Corpuscular Volume  91  80-99  FL


 


Mean Corpuscular Hemoglobin  31  25-34  PG


 


Mean Corpuscular Hemoglobin


Concent 


 34 


 32-36  G/DL





 


Red Cell Distribution Width  13.0  10.0-14.5  %


 


Platelet Count


 


 164 


 130-400


10^3/uL


 


Mean Platelet Volume  10.3  7.4-10.4  FL


 


Neutrophils (%) (Auto)  38 L 42-75  %


 


Lymphocytes (%) (Auto)  48 H 12-44  %


 


Monocytes (%) (Auto)  9  0-12  %


 


Eosinophils (%) (Auto)  4  0-10  %


 


Basophils (%) (Auto)  1  0-10  %


 


Neutrophils # (Auto)  2.8  1.8-7.8  X 10^3


 


Lymphocytes # (Auto)  3.5  1.0-4.0  X 10^3


 


Monocytes # (Auto)  0.7  0.0-1.0  X 10^3


 


Eosinophils # (Auto)


 


 0.3 


 0.0-0.3


10^3/uL


 


Basophils # (Auto)


 


 0.0 


 0.0-0.1


10^3/uL


 


Sodium Level  139  135-145  MMOL/L


 


Potassium Level  3.8  3.6-5.0  MMOL/L


 


Chloride Level  107    MMOL/L


 


Carbon Dioxide Level  20 L 21-32  MMOL/L


 


Anion Gap  12  5-14  MMOL/L


 


Blood Urea Nitrogen  26 H 7-18  MG/DL


 


Creatinine


 


 0.90 


 0.60-1.30


MG/DL


 


Estimat Glomerular Filtration


Rate 


 60 


  





 


BUN/Creatinine Ratio  29   


 


Glucose Level  73    MG/DL


 


Calcium Level  9.8  8.5-10.1  MG/DL


 


Corrected Calcium  9.7  8.5-10.1  MG/DL


 


Total Bilirubin  0.4  0.1-1.0  MG/DL


 


Aspartate Amino Transf


(AST/SGOT) 


 17 


 5-34  U/L





 


Alanine Aminotransferase


(ALT/SGPT) 


 10 


 0-55  U/L





 


Alkaline Phosphatase  70    U/L


 


Total Protein  6.9  6.4-8.2  GM/DL


 


Albumin  4.1  3.2-4.5  GM/DL


 


Lipase  51  8-78  U/L





 (LAW RICO)


My Orders





Orders - LAW RICO


Metoprolol Tartrate Injection (Lopressor (9/14/18 18:45)


 (LAW RICO)


Medications Given in ED





Current Medications








 Medications  Dose


 Ordered  Sig/Geraldine


 Route  Start Time


 Stop Time Status Last Admin


Dose Admin


 


 Al Hydrox/Mg


 Hydrox/Simethicone  30 ml  ONCE  ONCE


 PO  9/14/18 17:30


 9/14/18 17:31 DC 9/14/18 18:15


30 ML


 


 Lidocaine HCl  15 ml  ONCE  ONCE


 PO  9/14/18 17:30


 9/14/18 17:31 DC 9/14/18 18:15


15 ML


 


 Ondansetron HCl  4 mg  ONCE  ONCE


 IVP  9/14/18 17:30


 9/14/18 17:31 DC 9/14/18 18:15


4 MG





 (LAW RICO)


Vital Signs/I&O











 9/14/18





 16:35


 


Temp 98.2


 


Pulse 68


 


Resp 14


 


B/P (MAP) 193/83 (119)


 


Pulse Ox 96





 (LAW RICO)








Blood Pressure Mean:  119











Progress


Progress Note :  


   Time:  19:30


Progress Note


Assumed care of the patient at shift change and I have alerted visit with and 

taken history and examination of the patient which is consistent with the 

history and examination as documented by Dr. Lowery. Patient experienced 

relief from her symptoms within minutes of the GI cocktail. She was also given 

Zofran and is no longer having any nausea. She does have a history of stomach 

polyp and EGD in 2015 by Dr. Huizar and would probably be gandhi that she have 

another scope. We'll set her up on some antacids and Carafate. She recently 

switched from her omeprazole to pantoprazole and she does not think it's 

working as well.


 (LAW RICO)





Departure


Impression





 Primary Impression:  


 Acute gastritis without mention of hemorrhage


 Additional Impression:  


 Urinary tract infection


 Qualified Codes:  N30.00 - Acute cystitis without hematuria


Disposition:  01 HOME, SELF-CARE


Condition:  Improved





Departure-Patient Inst.


Decision time for Depature:  19:59


 (LAW RICO)


Referrals:  


MERNA DRIVER DO (PCP)


Primary Care Physician








JESUS HUIZAR DO


Patient Instructions:  Gastritis (DC)





Add. Discharge Instructions:  


Your workup seems to be consistent with an irritated lining of your stomach. 

This could be from peptic ulcer or other concern will be best discovered by EGD 

or a camera being placed in your stomach by Dr. Huizar. Keep your follow-up 

appointment with Dr. Driver on Tuesday. Continue taking the pantoprazole in 

addition to the next 2 weeks take Carafate 30 minutes prior to all meals and 

before bedtime. If you're still having breakthrough burning you can take a 

tablet of ranitidine 1-2 times daily.


Take the Macrobid/nitrofurantoin one capsule twice a day for the next 7 days 

for your urinary tract infection.





All discharge instructions reviewed with patient and/or family. Voiced 

understanding.


Scripts


Nitrofurantoin Macrocrystal (Nitrofurantoin) 100 Mg Capsule


100 MG PO BID for 7 Days, #14 CAP


   Prov: LAW RICO         9/14/18 


Sucralfate (Carafate) 1 Gm Tablet


1 GM PO QIDACHS for 14 Days, #56 TAB 0 Refills


   Prov: LAW RICO         9/14/18





Copy


Copies To 1:   JESUS HUIZAR DO; MERNA DRIVER DO


Copies To 2:   MERNA DRIVER JOSHUA T MD Sep 14, 2018 18:49


LAW RICO Sep 14, 2018 19:37

## 2018-09-14 NOTE — XMS REPORT
Continuity of Care Document

 Created on: 2018



BERNICE FOWLER

External Reference #: [id number]

: 1940

Sex: Female



Demographics







 Address  [address]

[place]  [address]

 

 Home Phone  [phone] x

 

 Preferred Language  Unknown

 

 Marital Status  Unknown

 

 Restorationism Affiliation  Unknown

 

 Race  Unknown

 

 Ethnic Group  Unknown





Author







 Author  [facility]

 

 Organization  [facility]

 

 Address  Unknown

 

 Phone  Unavailable



              



Allergies

      





 Active            Description            Code            Type            
Severity            Reaction            Onset            Reported/Identified   
         Relationship to Patient            Clinical Status        

 

 Yes            quinine            [id number]            Drug Allergy          
  Unknown            N/A                         10/15/2015                    
              



                  



Medications

      



There is no data.                  



Problems

      





 Date Dx Coded            Attending            Type            Code            
Diagnosis            Diagnosed By        

 

 [address]            ZAYRA STREETER            Ot            M19.90
            UNSPECIFIED OSTEOARTHRITIS, UNSPECIFIED                      

 

 [address]            ZAYRA STRE[address]ER            Ot            M48.00
            SPINAL STENOSIS, SITE UNSPECIFIED                     

 

 1530            MERNA DRIVER DO            Ot            M51.36
            OTHER INTERVERTEBRAL DISC DEGENERATION,                      

 

 2012                         Ot            722.52            LUMB/
LUMBOSAC DISC DEGEN                     

 

 2012                         Ot            V57.1            PHYSICAL 
THERAPY NEC                     

 

 2013            PREETI RAMSEY, CAITIE HOFF            Ot            401.9 
           HYPERTENSION NOS                     

 

 2013            PREETI RAMSEY, CAITIE HOFF            Ot            433.10
            CAROTID ARTERY OCCLUSION W O CEREBRAL IN                     

 

 2013            PREETI RAMSEY, CAITIE HOFF            Ot            784.0 
           HEADACHE                     

 

 2013            PREETI RAMSEY, CAITIE HOFF            Ot            V58.69
            OTH MED,LT,CURRENT USE                     

 

 2015                         Ot            401.9            HYPERTENSION 
NOS                     

 

 2015                         Ot            414.00            CORON 
ATHEROSCLER NOS TYPE VESSEL, NATIV                     

 

 2015                         Ot            530.81            ESOPHAGEAL 
REFLUX                     

 

 2015                         Ot            786.59            CHEST PAIN 
NEC                     

 

 2015                         Ot            V45.81            
AORTOCORONARY BYPASS                     

 

 2015            MERNA DRIVER DO S            Ot            724.1 
                                 

 

 2015            MERNA DRIVER DO S            Ot            V57.1 
                                 

 

 04/15/2015            MERNA DRIVER DO S            Ot            724.1 
           PAIN IN THORACIC SPINE                     

 

 04/15/2015            MERNA DRIVER DO            Ot            V57.1 
           PHYSICAL THERAPY NEC                     

 

 10/15/2015            HUIZAR JESUS CASE            Ot            K31.7       
     POLYP OF STOMACH AND DUODENUM                     

 

 10/15/2015            HUIZAR JESUS CASE            Ot            K52.9       
     NONINFECTIVE GASTROENTERITIS AND COLITIS                     

 

 10/15/2015            HUIZAR JESUS CASE            Ot            K57.90      
      DVRTCLOS OF INTEST, PART UNSP, W/O PERF                      

 

 10/15/2015            JESUS HUIZAR DO            Ot            Z12.11      
      ENCOUNTER FOR SCREENING FOR MALIGNANT NE                     

 

 10/15/2015            JESUS HUIZAR DO            Ot            Z80.0       
     FAMILY HISTORY OF MALIGNANT NEOPLASM OF                      

 

 2017                         Ot            611.71            MASTODYNIA 
                    

 

 2017                         Ot            Z01.818            ENCOUNTER 
FOR OTHER PREPROCEDURAL EXAMIN                     

 

 2017                         Ot            Z12.11            ENCOUNTER 
FOR SCREENING FOR MALIGNANT NE                     

 

 2017                         Ot            Z80.0            FAMILY 
HISTORY OF MALIGNANT NEOPLASM OF                      

 

 2017            CLAUDETTE RIDER DO            Ot            E66.9          
  OBESITY, UNSPECIFIED                     

 

 2017            CLAUDETTE RIDER DO            Ot            I10            
ESSENTIAL (PRIMARY) HYPERTENSION                     

 

 2017            CLAUDETTE RIDER DO            Ot            I25.10         
   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                      

 

 2017            CLAUDETTE RIDER DO            Ot            I25.2          
  OLD MYOCARDIAL INFARCTION                     

 

 2017            CLAUDETTE RIDER DO, Ot            K44.9          
  DIAPHRAGMATIC HERNIA WITHOUT OBSTRUCTION                     

 

 2017            CLAUDETTE RIDER DO            Ot            K57.30         
   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                     

 

 2017            CLAUDETTE RIDER DO, Ot            M47.812        
    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                     

 

 2017            CLAUDETTE RIDER DO            Ot            S00.83XA       
     CONTUSION OF OTHER PART OF HEAD, INITIAL                     

 

 2017            CLAUDETTE RIDER DO            Ot            S20.311A       
     ABRASION OF RIGHT FRONT WALL OF THORAX,                      

 

 2017            CLAUDETTE RIDER DO            Ot            S20.312A       
     ABRASION OF LEFT FRONT WALL OF THORAX, I                     

 

 2017            CLAUDETTE RIDER DO, Ot            S29.9XXA       
     UNSPECIFIED INJURY OF THORAX, INITIAL EN                     

 

 2017            CLAUDETTE RIDER DO            Ot            W18.09XA       
     STRIKING AGAINST OTH OBJECT W SUBSEQUENT                     

 

 2017            CLAUDETTE RIDER DO            Ot            Y92.013        
    BEDROOM OF SINGLE-FAMILY (PRIVATE) HOUSE                     

 

 2017            ADRY CLAUDETTE CASE            Ot            Y99.8          
  OTHER EXTERNAL CAUSE STATUS                     

 

 2017            Jayess CLAUDETTE CASE            Ot            Z79.82         
   LONG TERM (CURRENT) USE OF ASPIRIN                     

 

 2017            ADRY CLAUDETTE CASE            Ot            Z79.899        
    OTHER LONG TERM (CURRENT) DRUG THERAPY                     

 

 2017            ADRY CLAUDETTE CASE            Ot            Z95.1          
  PRESENCE OF AORTOCORONARY BYPASS GRAFT                     

 

 2017            ADRY CLAUDETTE CASE            Ot            E66.9          
  OBESITY, UNSPECIFIED                     

 

 2017            ADRY CLAUDETTE CASE            Ot            I10            
ESSENTIAL (PRIMARY) HYPERTENSION                     

 

 2017            ADRY CLAUDETTE CASE            Ot            I25.10         
   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                      

 

 2017            ADRY CLAUDETTE CASE            Ot            I25.2          
  OLD MYOCARDIAL INFARCTION                     

 

 2017            ADRY CLAUDETTE CASE            Ot            K44.9          
  DIAPHRAGMATIC HERNIA WITHOUT OBSTRUCTION                     

 

 2017            ADRY CLAUDETTE CASE            Ot            K57.30         
   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                     

 

 2017            ADRY CLAUDETTE CASE            Ot            M47.812        
    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                     

 

 2017            ADRY CLAUDETTE CASE            Ot            S00.83XA       
     CONTUSION OF OTHER PART OF HEAD, INITIAL                     

 

 2017            ADRY CLAUDETTE CASE            Ot            S20.311A       
     ABRASION OF RIGHT FRONT WALL OF THORAX,                      

 

 2017            ADRY CLAUDETTE CASE            Ot            S20.312A       
     ABRASION OF LEFT FRONT WALL OF THORAX, I                     

 

 2017            ADRY CLAUDETTE CASE            Ot            S29.9XXA       
     UNSPECIFIED INJURY OF THORAX, INITIAL EN                     

 

 2017            ADRY CLAUDETTE CASE            Ot            W18.09XA       
     STRIKING AGAINST OTH OBJECT W SUBSEQUENT                     

 

 2017            ADRY CLAUDETTE CASE            Ot            Y92.013        
    BEDROOM OF SINGLE-FAMILY (PRIVATE) HOUSE                     

 

 2017            ADRY CLAUDETTE CASE            Ot            Y99.8          
  OTHER EXTERNAL CAUSE STATUS                     

 

 2017            ADRY CLAUDETTE CASE            Ot            Z79.82         
   LONG TERM (CURRENT) USE OF ASPIRIN                     

 

 2017            ADRYCLAUDETTE PAINTER DO            Ot            Z79.899        
    OTHER LONG TERM (CURRENT) DRUG THERAPY                     

 

 2017            CLAUDETTE RIDER DO            Ot            Z95.1          
  PRESENCE OF AORTOCORONARY BYPASS GRAFT                     

 

 2017                         Ot            611.71            MASTODYNIA 
                    

 

 2017                         Ot            Z01.818            ENCOUNTER 
FOR OTHER PREPROCEDURAL EXAMIN                     

 

 2017                         Ot            Z12.11            ENCOUNTER 
FOR SCREENING FOR MALIGNANT NE                     

 

 2017                         Ot            Z80.0            FAMILY 
HISTORY OF MALIGNANT NEOPLASM OF                      

 

 2017            ORENDER DO, MERNA S            Ot            N64.4 
           MASTODYNIA                     

 

 2017            ORENDER DO, MERNA S            Ot            N64.4 
           MASTODYNIA                     

 

 2017                         Ot            611.71            MASTODYNIA 
                    

 

 2017                         Ot            Z01.818            ENCOUNTER 
FOR OTHER PREPROCEDURAL EXAMIN                     

 

 2017                         Ot            Z12.11            ENCOUNTER 
FOR SCREENING FOR MALIGNANT NE                     

 

 2017                         Ot            Z80.0            FAMILY 
HISTORY OF MALIGNANT NEOPLASM OF                      

 

 2017            LINDANDER DO, MERNA S            Ot            N64.4 
           MASTODYNIA                     

 

 2017            [facility]            [facility]            N64.4 
           MASTODYNIA                     

 

 2017            ORENDER DO, MERNA S            Ot            N64.4 
           MASTODYNIA                     

 

 2017            ORENDER DO, MERNA S            Ot            N64.4 
           MASTODYNIA                     

 

 2017            LINDANDER DO, MERNA S            Ot            N64.4 
           MASTODYNIA                     

 

 2017            LINDANDER DO, MERNA S            Ot            N64.4 
           MASTODYNIA                     

 

 2017            LINDANDER DO, MERNA S            Ot            M51.36
            OTHER INTERVERTEBRAL DISC DEGENERATION,                      

 

 2017            LINDANDER DO, MERNA S            Ot            M51.36
            OTHER INTERVERTEBRAL DISC DEGENERATION,                      

 

 2017            LINDANDER DO, MERNA S            Ot            M51.36
            OTHER INTERVERTEBRAL DISC DEGENERATION,                      

 

 08/10/2017            LINDANDER DO, MERNA S            Ot            M51.36
            OTHER INTERVERTEBRAL DISC DEGENERATION,                      

 

 2018            KRISTEL CASE MERNA S            Ot            M48.02
            SPINAL STENOSIS, CERVICAL REGION                     

 

 2018            KRISTEL CASE MERNA S            Ot            
M50.322            OTHER CERVICAL DISC DEGENERATION AT C5-C                     

 

 2018            KRISTEL CASE MERNA S            Ot            M99.71
            CONN TISS AND DISC STENOSIS OF INTVRT FO                     

 

 2018            LINDANDER DO, MERNA CHEATHAM            Ot            M48.02
            SPINAL STENOSIS, CERVICAL REGION                     

 

 2018            LINDANDER DO, MERNA CHEATHAM            Ot            
M50.322            OTHER CERVICAL DISC DEGENERATION AT C5-C                     

 

 2018            LINDANDER MERNA CASE            Ot            M99.71
            CONN TISS AND DISC STENOSIS OF INTVRT FO                     

 

 2018            SYL, ZAYRA R APRN            Ot            M19.90
            UNSPECIFIED OSTEOARTHRITIS, UNSPECIFIED                      

 

 2018            SYL, ZAYRA R APRN            Ot            M48.00
            SPINAL STENOSIS, SITE UNSPECIFIED                     

 

 2018            SYL, ZAYRA R APRN            Ot            M19.90
            UNSPECIFIED OSTEOARTHRITIS, UNSPECIFIED                      

 

 2018            SYL, ZAYRA R APRN            Ot            M48.00
            SPINAL STENOSIS, SITE UNSPECIFIED                     



                                                                               
                                                                               
                                        



Procedures

      



There is no data.                  



Results

      





 Test            Result            Range        









 Complete blood count (CBC) with automated white blood cell (WBC) differential 
- 17 16:48         









 Blood leukocytes automated count (number/volume)            7.0 10*3/uL       
     4.3-11.0        

 

 Blood erythrocytes automated count (number/volume)            4.23 10*6/uL    
        4.35-5.85        

 

 Venous blood hemoglobin measurement (mass/volume)            12.8 g/dL        
    11.5-16.0        

 

 Blood hematocrit (volume fraction)            38 %            35-52        

 

 Automated erythrocyte mean corpuscular volume            90 [foz_us]          
  80-99        

 

 Automated erythrocyte mean corpuscular hemoglobin (mass per erythrocyte)      
      30 pg            25-34        

 

 Automated erythrocyte mean corpuscular hemoglobin concentration measurement (
mass/volume)            34 g/dL            32-36        

 

 Automated erythrocyte distribution width ratio            13.7 %            
10.0-14.5        

 

 Automated blood platelet count (count/volume)            170 10*3/uL          
  130-400        

 

 Automated blood platelet mean volume measurement            11.4 [foz_us]     
       7.4-10.4        

 

 Automated blood neutrophils/100 leukocytes            46 %            42-75   
     

 

 Automated blood lymphocytes/100 leukocytes            42 %            12-44   
     

 

 Blood monocytes/100 leukocytes            8 %            0-12        

 

 Automated blood eosinophils/100 leukocytes            3 %            0-10     
   

 

 Automated blood basophils/100 leukocytes            0 %            0-10        

 

 Blood neutrophils automated count (number/volume)            3.2 10*3         
   1.8-7.8        

 

 Blood lymphocytes automated count (number/volume)            3.0 10*3         
   1.0-4.0        

 

 Blood monocytes automated count (number/volume)            0.5 10*3            
0.0-1.0        

 

 Automated eosinophil count            0.2 10*3/uL            0.0-0.3        

 

 Automated blood basophil count (count/volume)            0.0 10*3/uL          
  0.0-0.1        









 PT panel in platelet poor plasma by coagulation assay - 17 16:48         









 Prothrombin time (PT) in platelet poor plasma by coagulation assay            
13.1 s            12.2-14.7        

 

 INR in platelet poor plasma or blood by coagulation assay            1.0      
       0.8-1.4        









 Activated partial thromboplastin time (aPTT) in platelet poor plasma 
bycoagulation assay - 17 16:48         









 Activated partial thromboplastin time (aPTT) in platelet poor plasma 
bycoagulation assay            25 s            24-35        









 Comprehensive metabolic panel - 17 16:48         









 Serum or plasma sodium measurement (moles/volume)            142 mmol/L       
     135-145        

 

 Serum or plasma potassium measurement (moles/volume)            4.3 mmol/L    
        3.6-5.0        

 

 Serum or plasma chloride measurement (moles/volume)            112 mmol/L     
               

 

 Carbon dioxide            21 mmol/L            21-32        

 

 Serum or plasma anion gap determination (moles/volume)            9 mmol/L    
        5-14        

 

 Serum or plasma urea nitrogen measurement (mass/volume)            25 mg/dL   
         7-18        

 

 Serum or plasma creatinine measurement (mass/volume)            1.14 mg/dL    
        0.60-1.30        

 

 Serum or plasma urea nitrogen/creatinine mass ratio            22             
NRG        

 

 Serum or plasma creatinine measurement with calculation of estimated 
glomerular filtration rate            46             NRG        

 

 Serum or plasma glucose measurement (mass/volume)            109 mg/dL        
            

 

 Serum or plasma calcium measurement (mass/volume)            9.5 mg/dL        
    8.5-10.1        

 

 Serum or plasma total bilirubin measurement (mass/volume)            0.3 mg/dL
            0.1-1.0        

 

 Serum or plasma alkaline phosphatase measurement (enzymatic activity/volume)  
          69 U/L                    

 

 Serum or plasma aspartate aminotransferase measurement (enzymatic activity/
volume)            20 U/L            5-34        

 

 Serum or plasma alanine aminotransferase measurement (enzymatic activity/volume
)            12 U/L            0-55        

 

 Serum or plasma protein measurement (mass/volume)            6.3 g/dL         
   6.4-8.2        

 

 Serum or plasma albumin measurement (mass/volume)            3.9 g/dL         
   3.2-4.5        









 Magnesium - 17 16:48         









 Magnesium            2.2 mg/dL            1.8-2.4        









 Serum or plasma creatine kinase measurement (enzymatic activity/volume) -  16:48         









 Serum or plasma creatine kinase measurement (enzymatic activity/volume)       
     48 U/L                    









 Serum or plasma creatine kinase MB measurement (enzymatic activity/volume) -  16:48         









 Serum or plasma creatine kinase MB measurement (enzymatic activity/volume)    
        1.3 ng/mL            <6.6        









 Serum or plasma troponin i.cardiac measurement (mass/volume) - 17 16:48 
        









 Serum or plasma troponin i.cardiac measurement (mass/volume)            < ng/
mL            <0.30        









 Serum or plasma amylase measurement (enzymatic activity/volume) - 17 16:
48         









 Serum or plasma amylase measurement (enzymatic activity/volume)            36 U
/L                    



                                



Encounters

      





 ACCT No.            Visit Date/Time            Discharge            Status    
        Pt. Type            Provider            Facility            Loc./Unit  
          Complaint        

 

 [id number]            2018 11:15:00            2018 13:09:00    
        DIS            Outpatient            SYLZAYRA PARKER          
  [facility]            REHAB            ARTHRITIS AND 
STENOSIS        

 

 [id number]            2018 09:13:00            2018 23:59:59    
        [facility]            Outpatient            MERNA DRIVER DO          
  [facility]            RAD            R CERVICAL PAIN 
WITH R ARM RADICULOPATHY        

 

 [id number]            08/10/2017 14:45:00            08/10/2017 15:31:00    
        DIS            Outpatient            MERNA DRIVER DO          
  [facility]            REHAB            LOW BACK PAIN; 
LUMBAR DDD        

 

 [id number]            2017 09:08:00            2017 23:59:59    
        [facility]            Outpatient            MERNA DRIVER DO          
  [facility]            RAD            RAMSES MASTALGIA     
   

 

 [id number]            2017 15:19:00            2017 19:29:00    
        DIS            Emergency            CLAUDETTE RIDER DO            [facility]            ER            FALL/COLLAR BONE INJ/R 
SIDE PAIN        

 

 [id number]            10/15/2015 13:58:00            10/15/2015 17:00:00    
        DIS            Outpatient            JESUS HUIZAR DO            [facility] 
[facility]            FAMILY HX COLON CA, 
QUESIONABLE BARRETTS        

 

 [id number]            04/15/2015 14:57:00            04/15/2015 15:33:00    
        DIS            Outpatient            MERNA DRIVER DO          
  [facility]            REHAB            THORACIC BACK 
PAIN /  SPASM        

 

 [id number]            2013 16:15:00            2013 18:56:00    
        DIS            Emergency            CAITIE ÁLVAREZ MD            
[facility]            ER            HEADACHE,EAR PAIN    
    

 

 [id number]            2018 16:21:00                         [facility]       
     Emergency            CAITIE ÁLVAREZ MD            [facility]            ER            NOT FEELING WELL/ABD PAIN        

 

 [id number]            10/12/2015 05:44:00                                   
   Document Registration                                                       
     

 

 [id number]            2015 19:30:00                                   
   Document Registration                                                       
     

 

 [id number]            04/15/2013 08:27:00                                   
   Document Registration                                                       
     

 

 [id number]            2012 12:55:00                                   
   Document Registration                                                       
     

 

 2018 23:27:00                         [facility]            
Outpatient            Merna Driver                                   
            

 

 [id number]            2015 05:54:17                         [facility]            
Document Registration

## 2018-09-14 NOTE — XMS REPORT
[facility]

 Created on: 2016



Leilani Ramírez

External Reference #: [id number]

: 1940

Sex: Female



Demographics







 Address  [address] [place]  [address]

 

 Home Phone  [phone]

 

 Preferred Language  Unknown

 

 Marital Status  Unknown

 

 Sikh Affiliation  Unknown

 

 Race  White

 

 Ethnic Group  Not  or 





Author







 ROOPA Robles

 

 [facility]  [employer]

 

 Address  Unknown

 

 Phone  Unavailable







Care Team Providers







 Care Team Member Name  Role  Phone

 

 ROOPA HEMPHILL  CP  Unavailable



                                                                



Allergies

          No Known Allergies                                                   
                                     



Problems

          No Known Problems                                                    
                                    



Medications

          No Known Medications                                                 
                             



Results

          No Known Results                                                     
               



Summary Purpose

          [employer] Submission

## 2018-12-11 ENCOUNTER — HOSPITAL ENCOUNTER (OUTPATIENT)
Dept: HOSPITAL 75 - RAD | Age: 78
End: 2018-12-11
Attending: FAMILY MEDICINE
Payer: MEDICARE

## 2018-12-11 DIAGNOSIS — Z95.828: ICD-10-CM

## 2018-12-11 DIAGNOSIS — Z90.49: ICD-10-CM

## 2018-12-11 DIAGNOSIS — K44.9: ICD-10-CM

## 2018-12-11 DIAGNOSIS — I51.7: ICD-10-CM

## 2018-12-11 DIAGNOSIS — Z90.710: ICD-10-CM

## 2018-12-11 DIAGNOSIS — K57.30: Primary | ICD-10-CM

## 2018-12-11 DIAGNOSIS — I25.10: ICD-10-CM

## 2018-12-11 PROCEDURE — 74176 CT ABD & PELVIS W/O CONTRAST: CPT

## 2018-12-11 NOTE — DIAGNOSTIC IMAGING REPORT
PROCEDURE: CT abdomen and pelvis without contrast.



TECHNIQUE: Multiple contiguous axial images were obtained through

the abdomen and pelvis without the use of intravenous contrast. 



INDICATION:  Upper abdominal pain.



FINDINGS: The previous CT chest, abdomen and pelvis exam of

03/12/2017 noted diverticulosis of the sigmoid colon but failed

to show any sign of acute diverticulitis. On this exam, there are

a few diverticula involving the sigmoid colon but there is still

no evidence for acute diverticulitis. The uterus is surgically

absent. The urinary bladder is only partially distended and

consequently not well evaluated. There is no obvious bladder

abnormality evident. There is no pelvic mass or free fluid

collection noted. The appendix was not well-visualized but there

are no indirect signs of acute appendicitis.



The liver, spleen, pancreas, adrenals, kidneys, aorta and

inferior vena cava show no sign of an acute abnormality. There

are a few small nonobstructive calculi within both kidneys. The

gallbladder is surgically absent.



The stomach is not well-distended and consequently difficult to

assess. The hiatal hernia seen on the previous exam is again

evident. The hernia now measures 3.5 x 4.4 cm.



The lung bases are clear. The heart is enlarged and there are

coronary calcifications evident. Sternotomy wires and surgical

clips are noted as well.



The bone windows show no sign of a fracture or of a destructive

lesion. 



IMPRESSION:

1. There is diverticulosis of the sigmoid colon but there is no

evidence for acute diverticulitis.

2. There is no acute abnormality of the abdomen or pelvis noted

otherwise.

3. The uterus and gallbladder are surgically absent.

4. There is a hiatal hernia.

5. There is cardiomegaly and coronary artery disease. 



Dictated by: 



  Dictated on workstation # [id number]

## 2019-01-03 ENCOUNTER — HOSPITAL ENCOUNTER (OUTPATIENT)
Dept: HOSPITAL 75 - PREOP | Age: 79
Discharge: HOME | End: 2019-01-03
Attending: SURGERY
Payer: MEDICARE

## 2019-01-03 VITALS — WEIGHT: 210.38 LBS | HEIGHT: 62 IN | BODY MASS INDEX: 38.72 KG/M2

## 2019-01-03 DIAGNOSIS — Z01.818: Primary | ICD-10-CM

## 2019-01-08 ENCOUNTER — HOSPITAL ENCOUNTER (OUTPATIENT)
Dept: HOSPITAL 75 - ENDO | Age: 79
Discharge: HOME | End: 2019-01-08
Attending: SURGERY
Payer: MEDICARE

## 2019-01-08 VITALS — DIASTOLIC BLOOD PRESSURE: 75 MMHG | SYSTOLIC BLOOD PRESSURE: 166 MMHG

## 2019-01-08 VITALS — DIASTOLIC BLOOD PRESSURE: 87 MMHG | SYSTOLIC BLOOD PRESSURE: 170 MMHG

## 2019-01-08 VITALS — SYSTOLIC BLOOD PRESSURE: 167 MMHG | DIASTOLIC BLOOD PRESSURE: 89 MMHG

## 2019-01-08 VITALS — HEIGHT: 62 IN | WEIGHT: 210.38 LBS | BODY MASS INDEX: 38.72 KG/M2

## 2019-01-08 DIAGNOSIS — E11.51: ICD-10-CM

## 2019-01-08 DIAGNOSIS — Z79.82: ICD-10-CM

## 2019-01-08 DIAGNOSIS — E66.9: ICD-10-CM

## 2019-01-08 DIAGNOSIS — I25.10: ICD-10-CM

## 2019-01-08 DIAGNOSIS — K21.0: ICD-10-CM

## 2019-01-08 DIAGNOSIS — I10: ICD-10-CM

## 2019-01-08 DIAGNOSIS — K22.70: Primary | ICD-10-CM

## 2019-01-08 DIAGNOSIS — K44.9: ICD-10-CM

## 2019-01-08 DIAGNOSIS — K31.7: ICD-10-CM

## 2019-01-08 DIAGNOSIS — Z79.899: ICD-10-CM

## 2019-01-08 PROCEDURE — 88305 TISSUE EXAM BY PATHOLOGIST: CPT

## 2019-01-08 PROCEDURE — 88312 SPECIAL STAINS GROUP 1: CPT

## 2019-01-08 NOTE — PROGRESS NOTE-PRE OPERATIVE
Pre-Operative Progress Note


H&P Reviewed


The H&P was reviewed, patient examined and no changes noted.


Date Seen by Provider:  Jan 8, 2019


Time Seen by Provider:  07:44


Date H&P Reviewed:  Jan 8, 2019


Time H&P Reviewed:  07:44


Pre-Operative Diagnosis:  hiatal hernia, epigastric abdominal pain.











JESUS HUIZAR DO Jan 8, 2019 07:46

## 2019-01-08 NOTE — OPERATIVE REPORT
DATE OF SERVICE:  01/08/2019



PREOPERATIVE DIAGNOSES:

Hiatal hernia and epigastric abdominal pain.



POSTOPERATIVE DIAGNOSES:

Inflamed polyp, hiatal hernia and reflux esophagitis.



PROCEDURES:

EGD with biopsies.



SURGEON:

Jesus De La Torre DO.



ANESTHESIA:

Per MDA.



ESTIMATED BLOOD LOSS:

None.



COMPLICATIONS:

None.



INDICATIONS:

The patient is a 78-year-old female who has been having epigastric abdominal

pain.  She understands risks and benefits of the procedure and wished to proceed

with procedure.  Consent was signed in the chart.



PROCEDURE:

The patient was taken to the endoscopy suite and placed in the left lateral

recumbent position.  Timeout was performed.  Scope was inserted in mouth, down

the esophagus, stomach and into the duodenum without difficulty.  There were no

polyps, masses or ulcerations within the duodenum.  Scope was slowly retracted

back into the stomach where it was further insufflated.  Near the pyloric

opening, a small inflamed polyp with possible low ulceration was present. 

Biopsy was obtained.  Biopsy of the antrum was obtained as well.  There are

erythematous changes within the antrum as well.  A biopsy of the body of the

stomach was obtained as well.  Scope was retroflexed just noting a small hiatal

hernia with changes of the previous fundoplication.  Scope was returned to its

normal position and slowly withdrawn to the distal esophagus, which had changes

consistent with reflux esophagitis.  Biopsies of the distal esophagus were

obtained.  Scope was then slowly retracted back until completely removed, noting

no other pathology.  The patient tolerated the procedure well without any

complication.  She was taken to recovery room in stable condition.



RECOMMENDATIONS:

The patient is to continue on Protonix and Carafate.  Would add Pepcid twice a

day.  We will see how she is doing in two to three weeks.  Any issues before

then will be seen at that time.





Job ID: [id number]

DocumentID: [id number]

Dictated Date:  01/08/2019 08:35:30

Transcription Date: 01/08/2019 13:16:31

Dictated By: JESUS DE LA TORRE DO

## 2019-01-08 NOTE — DISCHARGE INST-SIMPLE/STANDARD
Discharge Inst-Standard


Discharge Medications


New, Converted or Re-Newed RX:  Transmitted to Pharmacy





Patient Instructions/Follow Up


Plan of Care/Instructions/FU:  


3 weeks Britt


Activity as Tolerated:  Yes


Discharge Diet:  Regular Diet











JESUS HUIZAR DO Jan 8, 2019 08:21

## 2019-01-08 NOTE — ANESTHESIA-GENERAL POST-OP
MAC


Patient Condition


Mental Status/LOC:  Same as Preop


Cardiovascular:  Satisfactory


Nausea/Vomiting:  Absent


Respiratory:  Satisfactory


Pain:  Controlled


Complications:  Absent





Post Op Complications


Complications


None





Follow Up Care/Instructions


Patient Instructions


None needed.





Anesthesiology Discharge Order


Discharge Order


Patient is doing well, no complaints, stable vital signs, no apparent adverse 

anesthesia problems.   


No complications reported per nursing.











JEFRY GALICIA CRNA Jan 8, 2019 10:45

## 2019-01-08 NOTE — XMS REPORT
Continuity of Care Document

 Created on: 2019



BERNICE FOWLER

External Reference #: [id number]

: 1940

Sex: Female



Demographics







 Address  [address]

[place]  [address]

 

 Home Phone  [phone] x

 

 Preferred Language  Unknown

 

 Marital Status  Unknown

 

 Temple Affiliation  Unknown

 

 Race  Unknown

 

 Ethnic Group  Unknown





Author







 Author  [facility]

 

 Organization  [facility]

 

 Address  Unknown

 

 Phone  Unavailable



              



Allergies

      





 Active            Description            Code            Type            
Severity            Reaction            Onset            Reported/Identified   
         Relationship to Patient            Clinical Status        

 

 Yes            quinine            [id number]            Drug Allergy          
  Unknown            N/A                         10/15/2015                    
              

 

 Yes            quinine            [id number]            Drug Allergy          
  Mild            RASH                         2019                      
            



                    



Medications

      



There is no data.                  



Problems

      





 Date Dx Coded            Attending            Type            Code            
Diagnosis            Diagnosed By        

 

 1308            ZAYRA STREETER            Ot            M19.90
            UNSPECIFIED OSTEOARTHRITIS, UNSPECIFIED                      

 

 1308            ZAYRA STREETER            Ot            M48.00
            SPINAL STENOSIS, SITE UNSPECIFIED                     

 

 1530            MERNA DRIVER DO            Ot            M51.36
            OTHER INTERVERTEBRAL DISC DEGENERATION,                      

 

 2012                         Ot            722.52            LUMB/
LUMBOSAC DISC DEGEN                     

 

 2012                         Ot            V57.1            PHYSICAL 
THERAPY NEC                     

 

 2013            PREETI RAMSEY, CAITIE HOFF            Ot            401.9 
           HYPERTENSION NOS                     

 

 2013            PREETI RAMSEY, CAITIE HOFF            Ot            433.10
            CAROTID ARTERY OCCLUSION W O CEREBRAL IN                     

 

 2013            PREETI RAMSEY, CAITIE HOFF            Ot            784.0 
           HEADACHE                     

 

 2013            PREETI RAMSEY, CAITIE HOFF            Ot            V58.69
            OTH MED,LT,CURRENT USE                     

 

 2015                         Ot            401.9            HYPERTENSION 
NOS                     

 

 2015                         Ot            414.00            CORON 
ATHEROSCLER NOS TYPE VESSEL, NATIV                     

 

 2015                         Ot            530.81            ESOPHAGEAL 
REFLUX                     

 

 2015                         Ot            786.59            CHEST PAIN 
NEC                     

 

 2015                         Ot            V45.81            
AORTOCORONARY BYPASS                     

 

 2015            MERNA DRIVER DO            Ot            724.1 
                                 

 

 2015            MERNA DRIVER DO            Ot            V57.1 
                                 

 

 04/15/2015            MERNA DRIVER DO            Ot            724.1 
           PAIN IN THORACIC SPINE                     

 

 04/15/2015            MERNA DRIVER DO            Ot            V57.1 
           PHYSICAL THERAPY NEC                     

 

 10/15/2015            JESUS HUIZAR DO            Ot            K31.7       
     POLYP OF STOMACH AND DUODENUM                     

 

 10/15/2015            HUIZAR JESUS CASE            Ot            K52.9       
     NONINFECTIVE GASTROENTERITIS AND COLITIS                     

 

 10/15/2015            [place] JESUS CASE            Ot            K57.90      
      DVRTCLOS OF INTEST, PART UNSP, W/O PERF                      

 

 10/15/2015            JESUS HUIZAR DO            Ot            Z12.11      
      ENCOUNTER FOR SCREENING FOR MALIGNANT NE                     

 

 10/15/2015            JESUS HUIZAR DO            Ot            Z80.0       
     FAMILY HISTORY OF MALIGNANT NEOPLASM OF                      

 

 2017                         Ot            611.71            MASTODYNIA 
                    

 

 2017                         Ot            Z01.818            ENCOUNTER 
FOR OTHER PREPROCEDURAL EXAMIN                     

 

 2017                         Ot            Z12.11            ENCOUNTER 
FOR SCREENING FOR MALIGNANT NE                     

 

 2017                         Ot            Z80.0            FAMILY 
HISTORY OF MALIGNANT NEOPLASM OF                      

 

 2017            CLAUDETTE RIDER DO            Ot            E66.9          
  OBESITY, UNSPECIFIED                     

 

 2017            CLAUDETTE RIDER DO            Ot            I10            
ESSENTIAL (PRIMARY) HYPERTENSION                     

 

 2017            CLAUDETTE RIDER DO            Ot            I25.10         
   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                      

 

 2017            CLAUDETTE RIDER DO            Ot            I25.2          
  OLD MYOCARDIAL INFARCTION                     

 

 2017            CLAUDETTE RIDER DO            Ot            K44.9          
  DIAPHRAGMATIC HERNIA WITHOUT OBSTRUCTION                     

 

 2017            CLAUDETTE RIDER DO            Ot            K57.30         
   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                     

 

 2017            CLAUDETTE RIDER DO            Ot            M47.812        
    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                     

 

 2017            CLAUDETTE RIDER DO            Ot            S00.83XA       
     CONTUSION OF OTHER PART OF HEAD, INITIAL                     

 

 2017            CLAUDETTE RIDER DO            Ot            S20.311A       
     ABRASION OF RIGHT FRONT WALL OF THORAX,                      

 

 2017            CLAUDETTE RIDER DO            Ot            S20.312A       
     ABRASION OF LEFT FRONT WALL OF THORAX, I                     

 

 2017            CLAUDETTE RIDER DO            Ot            S29.9XXA       
     UNSPECIFIED INJURY OF THORAX, INITIAL EN                     

 

 2017            CLAUDETTE RIDER DO            Ot            W18.09XA       
     STRIKING AGAINST OTH OBJECT W SUBSEQUENT                     

 

 2017            [facility] CLAUDETTE K            Ot            Y92.013        
    BEDROOM OF SINGLE-FAMILY (PRIVATE) HOUSE                     

 

 2017            ADRY CLAUDETTE CASE            Ot            Y99.8          
  OTHER EXTERNAL CAUSE STATUS                     

 

 2017            [place] CLAUDETTE CASE            Ot            Z79.82         
   LONG TERM (CURRENT) USE OF ASPIRIN                     

 

 2017            ADRY DOCLAUDETTE            Ot            Z79.899        
    OTHER LONG TERM (CURRENT) DRUG THERAPY                     

 

 2017            ADRY CLAUDETTE CASE            Ot            Z95.1          
  PRESENCE OF AORTOCORONARY BYPASS GRAFT                     

 

 2017            ADRY CLAUDETTE CASE            Ot            E66.9          
  OBESITY, UNSPECIFIED                     

 

 2017            ADRY CLAUDETTE CASE            Ot            I10            
ESSENTIAL (PRIMARY) HYPERTENSION                     

 

 [facility]            I25.10         
   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                      

 

 2017            ADRY DOCLAUDETTE            Ot            I25.2          
  OLD MYOCARDIAL INFARCTION                     

 

 2017            ADRY CLAUDETTE CASE            Ot            K44.9          
  DIAPHRAGMATIC HERNIA WITHOUT OBSTRUCTION                     

 

 [facility]            K57.30         
   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                     

 

 2017            ADRY CLAUDETTE CASE            Ot            M47.812        
    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                     

 

 2017            ADRY CLAUDETTE CASE            Ot            S00.83XA       
     CONTUSION OF OTHER PART OF HEAD, INITIAL                     

 

 2017            CLAUDETTE RIDER DO            Ot            S20.311A       
     ABRASION OF RIGHT FRONT WALL OF THORAX,                      

 

 2017            ADRY CLAUDETTE CASE            Ot            S20.312A       
     ABRASION OF LEFT FRONT WALL OF THORAX, I                     

 

 2017            ADRY CLAUDETTE CASE            Ot            S29.9XXA       
     UNSPECIFIED INJURY OF THORAX, INITIAL EN                     

 

 2017            CLAUDETTE RIDER DO            Ot            W18.09XA       
     STRIKING AGAINST OTH OBJECT W SUBSEQUENT                     

 

 2017            ADRY CLAUDETTE CASE            Ot            Y92.013        
    BEDROOM OF SINGLE-FAMILY (PRIVATE) HOUSE                     

 

 2017            ADRY CLAUDETTE CASE            Ot            Y99.8          
  OTHER EXTERNAL CAUSE STATUS                     

 

 2017            ADRY CLAUDETTE CASE            Ot            Z79.82         
   LONG TERM (CURRENT) USE OF ASPIRIN                     

 

 2017            CLAUDETTE RIDER DO            Ot            Z79.899        
    OTHER LONG TERM (CURRENT) DRUG THERAPY                     

 

 2017            CLAUDETTE RIDER DO            Ot            Z95.1          
  PRESENCE OF AORTOCORONARY BYPASS GRAFT                     

 

 2017                         Ot            611.71            MASTODYNIA 
                    

 

 2017                         Ot            Z01.818            ENCOUNTER 
FOR OTHER PREPROCEDURAL EXAMIN                     

 

 2017                         Ot            Z12.11            ENCOUNTER 
FOR SCREENING FOR MALIGNANT NE                     

 

 2017                         Ot            Z80.0            FAMILY 
HISTORY OF MALIGNANT NEOPLASM OF                      

 

 2017            LINDANDER DO, MERNA S            Ot            N64.4 
           MASTODYNIA                     

 

 2017            ORENDER DO, MERNA S            Ot            N64.4 
           MASTODYNIA                     

 

 2017                         Ot            611.71            MASTODYNIA 
                    

 

 2017                         Ot            Z01.818            ENCOUNTER 
FOR OTHER PREPROCEDURAL EXAMIN                     

 

 2017                         Ot            Z12.11            ENCOUNTER 
FOR SCREENING FOR MALIGNANT NE                     

 

 2017                         Ot            Z80.0            FAMILY 
HISTORY OF MALIGNANT NEOPLASM OF                      

 

 2017            LINDANDER DO, MERNA S            Ot            N64.4 
           MASTODYNIA                     

 

 [facility]            Ot            N64.4 
           MASTODYNIA                     

 

 2017            LINDAND DO, MERNA S            Ot            N64.4 
           MASTODYNIA                     

 

 2017            LINDAND DO, MERNA S            Ot            N64.4 
           MASTODYNIA                     

 

 2017            LINDANDEYSI DO, MERNA S            Ot            N64.4 
           MASTODYNIA                     

 

 2017            LINDANDYESI DO, MERNA S            Ot            N64.4 
           MASTODYNIA                     

 

 2017            KATJA DRIVER DOLINE S            Ot            M51.36
            OTHER INTERVERTEBRAL DISC DEGENERATION,                      

 

 2017            DORITA DRIVER DOQUELINE S            Ot            M51.36
            OTHER INTERVERTEBRAL DISC DEGENERATION,                      

 

 2017            DORITA DRIVER DOQUELINE S            Ot            M51.36
            OTHER INTERVERTEBRAL DISC DEGENERATION,                      

 

 08/10/2017            KATJA DRIVER DOLINE S            Ot            M51.36
            OTHER INTERVERTEBRAL DISC DEGENERATION,                      

 

 2018            MERNA DRIVER DO S            Ot            M48.02
            SPINAL STENOSIS, CERVICAL REGION                     

 

 2018            MERNA DRIVER DO S            Ot            
M50.322            OTHER CERVICAL DISC DEGENERATION AT C5-C                     

 

 2018            MERNA DRIVER DO            Ot            M99.71
            CONN TISS AND DISC STENOSIS OF INTVRT FO                     

 

 2018            MERNA DRIVER DO            Ot            M48.02
            SPINAL STENOSIS, CERVICAL REGION                     

 

 2018            MERNA DRIVER DO S            Ot            
M50.322            OTHER CERVICAL DISC DEGENERATION AT C5-C                     

 

 2018            KATJA DRIVER DOLINE S            Ot            M99.71
            CONN TISS AND DISC STENOSIS OF INTVRT FO                     

 

 2018            SYL, ZAYRA R APRN            Ot            M19.90
            UNSPECIFIED OSTEOARTHRITIS, UNSPECIFIED                      

 

 2018            SYL, ZAYRA R APRN            Ot            M48.00
            SPINAL STENOSIS, SITE UNSPECIFIED                     

 

 2018            SYL, ZAYRA R APRN            Ot            M19.90
            UNSPECIFIED OSTEOARTHRITIS, UNSPECIFIED                      

 

 2018            SLY, ZAYRA R APRN            Ot            M48.00
            SPINAL STENOSIS, SITE UNSPECIFIED                     

 

 2018                         Ot            611.71            MASTODYNIA 
                    

 

 2018                         Ot            Z01.818            ENCOUNTER 
FOR OTHER PREPROCEDURAL EXAMIN                     

 

 2018                         Ot            Z12.11            ENCOUNTER 
FOR SCREENING FOR MALIGNANT NE                     

 

 2018                         Ot            Z80.0            FAMILY 
HISTORY OF MALIGNANT NEOPLASM OF                      

 

 2018            MERNA DRIVER DO            Ot            N64.4 
           MASTODYNIA                     

 

 2018            MERNA DRIVER DO            Ot            M48.02
            SPINAL STENOSIS, CERVICAL REGION                     

 

 2018            MERNA DRIVER DO            Ot            
M50.322            OTHER CERVICAL DISC DEGENERATION AT C5-C                     

 

 2018            MERNA DRIVER DO            Ot            M99.71
            CONN TISS AND DISC STENOSIS OF INTVRT FO                     

 

 2018            TRISHA RAMSEY, LAW LOMAX            Ot            E11.51      
      TYPE 2 DIABETES W DIABETIC PERIPHERAL AN                     

 

 2018            TRISHA RAMSEY, LAW LOMAX            Ot            E66.9       
     OBESITY, UNSPECIFIED                     

 

 2018            LAW RICO MD            Ot            E78.00      
      PURE HYPERCHOLESTEROLEMIA, UNSPECIFIED                     

 

 2018            LAW RICO MD            Ot            F41.9       
     ANXIETY DISORDER, UNSPECIFIED                     

 

 2018            LAW RICO MD            Ot            I10         
   ESSENTIAL (PRIMARY) HYPERTENSION                     

 

 2018            LAW RICO MD            Ot            I25.10      
      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                      

 

 2018            LAW RICO MD            Ot            I25.2       
     OLD MYOCARDIAL INFARCTION                     

 

 2018            LAW RICO MD            Ot            I73.9       
     PERIPHERAL VASCULAR DISEASE, UNSPECIFIED                     

 

 2018            LAW RICO MD            Ot            K21.9       
     GASTRO-ESOPHAGEAL REFLUX DISEASE WITHOUT                     

 

 2018            LAW RICO MD            Ot            K29.00      
      ACUTE GASTRITIS WITHOUT BLEEDING                     

 

 2018            LAW RICO MD            Ot            N39.0       
     URINARY TRACT INFECTION, SITE NOT SPECIF                     

 

 2018            LAW RICO MD            Ot            R11.0       
     NAUSEA                     

 

 2018            LAW RICO MD            Ot            Z79.82      
      LONG TERM (CURRENT) USE OF ASPIRIN                     

 

 2018            LAW RICO MD            Ot            Z86.010     
       PERSONAL HISTORY OF COLONIC POLYPS                     

 

 2018            LAW RICO MD            Ot            Z87.19      
      PERSONAL HISTORY OF OTHER DISEASES OF TH                     

 

 2018            LAW RICO MD            Ot            Z87.440     
       PERSONAL HISTORY OF URINARY (TRACT) INFE                     

 

 2018            LAW RICO MD            Ot            Z88.8       
     ALLERGY STATUS TO [facility] DRUG/MEDS/BIOL SUB                     

 

 2018            LAW RICO MD            Ot            Z90.49      
      ACQUIRED ABSENCE OF OTHER SPECIFIED PART                     

 

 2018            LAW RICO MD            Ot            Z90.710     
       ACQUIRED ABSENCE OF BOTH CERVIX AND UTER                     

 

 2018            LAW RICO MD            Ot            Z90.89      
      ACQUIRED ABSENCE OF OTHER ORGANS                     

 

 2018            LAW RICO MD            Ot            Z95.1       
     PRESENCE OF AORTOCORONARY BYPASS GRAFT                     

 

 2018            LAW RICO MD            Ot            Z98.890     
       OTHER SPECIFIED POSTPROCEDURAL STATES                     

 

 2018            LAW RICO MD            Ot            E11.51      
      TYPE 2 DIABETES W DIABETIC PERIPHERAL AN                     

 

 2018            LAW RICO MD            Ot            E66.9       
     OBESITY, UNSPECIFIED                     

 

 2018            LAW RICO MD            Ot            E78.00      
      PURE HYPERCHOLESTEROLEMIA, UNSPECIFIED                     

 

 2018            LAW RICO MD            Ot            F41.9       
     ANXIETY DISORDER, UNSPECIFIED                     

 

 2018            LAW RICO MD            Ot            I10         
   ESSENTIAL (PRIMARY) HYPERTENSION                     

 

 2018            LAW RICO MD            Ot            I25.10      
      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                      

 

 2018            LAW RICO MD            Ot            I25.2       
     OLD MYOCARDIAL INFARCTION                     

 

 2018            LAW RICO MD            Ot            I73.9       
     PERIPHERAL VASCULAR DISEASE, UNSPECIFIED                     

 

 2018            LAW RICO MD            Ot            K21.9       
     GASTRO-ESOPHAGEAL REFLUX DISEASE WITHOUT                     

 

 2018            LAW RICO MD            Ot            K29.00      
      ACUTE GASTRITIS WITHOUT BLEEDING                     

 

 2018            LAW RICO MD            Ot            N39.0       
     URINARY TRACT INFECTION, SITE NOT SPECIF                     

 

 2018            LAW RICO MD            Ot            R11.0       
     NAUSEA                     

 

 2018            LAW RICO MD            Ot            Z79.82      
      LONG TERM (CURRENT) USE OF ASPIRIN                     

 

 2018            LAW RICO MD            Ot            Z86.010     
       PERSONAL HISTORY OF COLONIC POLYPS                     

 

 2018            LAW RICO MD            Ot            Z87.19      
      PERSONAL HISTORY OF OTHER DISEASES OF TH                     

 

 2018            LAW RICO MD            Ot            Z87.440     
       PERSONAL HISTORY OF URINARY (TRACT) INFE                     

 

 2018            LAW RICO MD            Ot            Z88.8       
     ALLERGY STATUS TO [facility] DRUG/MEDS/BIOL SUB                     

 

 2018            LAW RICO MD            Ot            Z90.49      
      ACQUIRED ABSENCE OF OTHER SPECIFIED PART                     

 

 2018            LAW RICO MD            Ot            Z90.710     
       ACQUIRED ABSENCE OF BOTH CERVIX AND UTER                     

 

 2018            LAW RICO MD            Ot            Z90.89      
      ACQUIRED ABSENCE OF OTHER ORGANS                     

 

 2018            LAW RICO MD            Ot            Z95.1       
     PRESENCE OF AORTOCORONARY BYPASS GRAFT                     

 

 2018            LAW R[place] MD            Ot            Z98.890     
       OTHER SPECIFIED POSTPROCEDURAL STATES                     

 

 2018            MERNA DRIVER DO            Ot            I25.10
            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                      

 

 2018            MERNA DRIVER DO            Ot            I51.7 
           CARDIOMEGALY                     

 

 2018            MERNA DRIVER DO            Ot            K44.9 
           DIAPHRAGMATIC HERNIA WITHOUT OBSTRUCTION                     

 

 2018            MERNA DRIVER DO            Ot            K57.30
            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                     

 

 2018            ORENDER DO, MERNA S            Ot            Z90.49
            ACQUIRED ABSENCE OF OTHER SPECIFIED PART                     

 

 2018            ORENDER DO, MERNA S            Ot            
Z90.710            ACQUIRED ABSENCE OF BOTH CERVIX AND UTER                     

 

 2018            ORENDER DO, MERNA S            Ot            
Z95.828            PRESENCE OF OTHER VASCULAR IMPLANTS AND                      

 

 2019            ORENDER DO, MERNA S            Ot            I25.10
            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                      

 

 2019            ORENDER DO, MERNA S            Ot            I51.7 
           CARDIOMEGALY                     

 

 2019            ORENDER DO, MERNA S            Ot            K44.9 
           DIAPHRAGMATIC HERNIA WITHOUT OBSTRUCTION                     

 

 2019            ORENDER DO, MERNA S            Ot            K57.30
            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                     

 

 2019            ORENDER DO, MERNA S            Ot            Z90.49
            ACQUIRED ABSENCE OF OTHER SPECIFIED PART                     

 

 2019            LINDANDER DO, MERNA S            Ot            
Z90.710            ACQUIRED ABSENCE OF BOTH CERVIX AND UTER                     

 

 2019            LINDANDER DO, MERNA S            Ot            
Z95.828            PRESENCE OF OTHER VASCULAR IMPLANTS AND                      

 

 2019            JESUS HUIZAR DO            Ot            Z01.818     
       ENCOUNTER FOR OTHER PREPROCEDURAL EXAMIN                     

 

 2019            JESUS HUIZAR DO            Ot            Z01.818     
       ENCOUNTER FOR OTHER PREPROCEDURAL EXAMIN                     



                                                                               
                                                                               
                                                                               
                                                                               
                  



Procedures

      



There is no data.                  



Results

      





 Test            Result            Range        









 Complete blood count (CBC) with automated white blood cell (WBC) differential 
- 17 16:48         









 Blood leukocytes automated count (number/volume)            7.0 10*3/uL       
     4.3-11.0        

 

 Blood erythrocytes automated count (number/volume)            4.23 10*6/uL    
        4.35-5.85        

 

 Venous blood hemoglobin measurement (mass/volume)            12.8 g/dL        
    11.5-16.0        

 

 Blood hematocrit (volume fraction)            38 %            35-52        

 

 Automated erythrocyte mean corpuscular volume            90 [foz_us]          
  80-99        

 

 Automated erythrocyte mean corpuscular hemoglobin (mass per erythrocyte)      
      30 pg            25-34        

 

 Automated erythrocyte mean corpuscular hemoglobin concentration measurement (
mass/volume)            34 g/dL            32-36        

 

 Automated erythrocyte distribution width ratio            13.7 %            
10.0-14.5        

 

 Automated blood platelet count (count/volume)            170 10*3/uL          
  130-400        

 

 Automated blood platelet mean volume measurement            11.4 [foz_us]     
       7.4-10.4        

 

 Automated blood neutrophils/100 leukocytes            46 %            42-75   
     

 

 Automated blood lymphocytes/100 leukocytes            42 %            12-44   
     

 

 Blood monocytes/100 leukocytes            8 %            0-12        

 

 Automated blood eosinophils/100 leukocytes            3 %            0-10     
   

 

 Automated blood basophils/100 leukocytes            0 %            0-10        

 

 Blood neutrophils automated count (number/volume)            3.2 10*3         
   1.8-7.8        

 

 Blood lymphocytes automated count (number/volume)            3.0 10*3         
   1.0-4.0        

 

 Blood monocytes automated count (number/volume)            0.5 10*3            
0.0-1.0        

 

 Automated eosinophil count            0.2 10*3/uL            0.0-0.3        

 

 Automated blood basophil count (count/volume)            0.0 10*3/uL          
  0.0-0.1        









 PT panel in platelet poor plasma by coagulation assay - 17 16:48         









 Prothrombin time (PT) in platelet poor plasma by coagulation assay            
13.1 s            12.2-14.7        

 

 INR in platelet poor plasma or blood by coagulation assay            1.0      
       0.8-1.4        









 Activated partial thromboplastin time (aPTT) in platelet poor plasma 
bycoagulation assay - 17 16:48         









 Activated partial thromboplastin time (aPTT) in platelet poor plasma 
bycoagulation assay            25 s            24-35        









 Comprehensive metabolic panel - 17 16:48         









 Serum or plasma sodium measurement (moles/volume)            142 mmol/L       
     135-145        

 

 Serum or plasma potassium measurement (moles/volume)            4.3 mmol/L    
        3.6-5.0        

 

 Serum or plasma chloride measurement (moles/volume)            112 mmol/L     
               

 

 Carbon dioxide            21 mmol/L            21-32        

 

 Serum or plasma anion gap determination (moles/volume)            9 mmol/L    
        5-14        

 

 Serum or plasma urea nitrogen measurement (mass/volume)            25 mg/dL   
         7-18        

 

 Serum or plasma creatinine measurement (mass/volume)            1.14 mg/dL    
        0.60-1.30        

 

 Serum or plasma urea nitrogen/creatinine mass ratio            22             
NRG        

 

 Serum or plasma creatinine measurement with calculation of estimated 
glomerular filtration rate            46             NRG        

 

 Serum or plasma glucose measurement (mass/volume)            109 mg/dL        
            

 

 Serum or plasma calcium measurement (mass/volume)            9.5 mg/dL        
    8.5-10.1        

 

 Serum or plasma total bilirubin measurement (mass/volume)            0.3 mg/dL
            0.1-1.0        

 

 Serum or plasma alkaline phosphatase measurement (enzymatic activity/volume)  
          69 U/L                    

 

 Serum or plasma aspartate aminotransferase measurement (enzymatic activity/
volume)            20 U/L            5-34        

 

 Serum or plasma alanine aminotransferase measurement (enzymatic activity/volume
)            12 U/L            0-55        

 

 Serum or plasma protein measurement (mass/volume)            6.3 g/dL         
   6.4-8.2        

 

 Serum or plasma albumin measurement (mass/volume)            3.9 g/dL         
   3.2-4.5        









 Magnesium - 17 16:48         









 Magnesium            2.2 mg/dL            1.8-2.4        









 Serum or plasma creatine kinase measurement (enzymatic activity/volume) -  16:48         









 Serum or plasma creatine kinase measurement (enzymatic activity/volume)       
     48 U/L                    









 Serum or plasma creatine kinase MB measurement (enzymatic activity/volume) -  16:48         









 Serum or plasma creatine kinase MB measurement (enzymatic activity/volume)    
        1.3 ng/mL            <6.6        









 Serum or plasma troponin i.cardiac measurement (mass/volume) - 17 16:48 
        









 Serum or plasma troponin i.cardiac measurement (mass/volume)            < ng/
mL            <0.30        









 Serum or plasma amylase measurement (enzymatic activity/volume) - 17 16:
48         









 Serum or plasma amylase measurement (enzymatic activity/volume)            36 U
/L                    









 Complete urinalysis with reflex to culture - 18 16:28         









 Urine color determination            YELLOW             NRG        

 

 Urine clarity determination            CLEAR             NRG        

 

 Urine pH measurement by test strip            6.5             5-9        

 

 Specific gravity of urine by test strip            1.010             1.016-
1.022        

 

 Urine protein assay by test strip, semi-quantitative            NEGATIVE      
       NEGATIVE        

 

 Urine glucose detection by automated test strip            NEGATIVE           
  NEGATIVE        

 

 Erythrocytes detection in urine sediment by light microscopy            
NEGATIVE             NEGATIVE        

 

 Urine ketones detection by automated test strip            NEGATIVE           
  NEGATIVE        

 

 Urine nitrite detection by test strip            POSITIVE             NEGATIVE
        

 

 Urine total bilirubin detection by test strip            NEGATIVE             
NEGATIVE        

 

 Urine urobilinogen measurement by automated test strip (mass/volume)          
  NORMAL             NORMAL        

 

 Urine leukocyte esterase detection by dipstick            2+             
NEGATIVE        

 

 Automated urine sediment erythrocyte count by microscopy (number/high power 
field)            NONE             NRG        

 

 Automated urine sediment leukocyte count by microscopy (number/high power field
)             [HPF]            NRG        

 

 Bacteria detection in urine sediment by light microscopy            LARGE     
        NRG        

 

 Crystals detection in urine sediment by light microscopy            NONE      
       NRG        

 

 Casts detection in urine sediment by light microscopy            NONE         
    NRG        

 

 Mucus detection in urine sediment by light microscopy            NEGATIVE     
        NRG        

 

 Complete urinalysis with reflex to culture            YES             NRG     
   









 Bacterial urine culture - 18 16:28         









 Bacterial urine culture            [id number]             NRG        

 

 COLONY COUNT            >100,000/ML             NRG        

 

 [facility];REPORTABLE            [facility] REPORT SENT BY [facility]  09:05             
NRG        









 RML Sensitivity Panel - 18 16:28         









 Gentamicin susceptibility test by minimum inhibitory concentration            <
=            NRG        

 

 Trimethoprim/sulfamethoxazole susceptibility test by minimum 
inhibitoryconcentration            <=            NRG        

 

 Levofloxacin susceptibility test by minimum inhibitory concentration          
  >             NRG        

 

 Ampicillin susceptibility test by minimum inhibitory concentration            
8             NRG        

 

 Cefazolin susceptibility test by minimum inhibitory concentration            2
             NRG        

 

 Ceftriaxone susceptibility test by minimum inhibitory concentration            
<=            NRG        

 

 Ciprofloxacin susceptibility test by minimum inhibitory concentration         
   >             NRG        

 

 Meropenem susceptibility test by minimum inhibitory concentration            <
=            NRG        

 

 Nitrofurantoin susceptibility test by minimum inhibitory concentration        
    <=            NRG        

 

 Amoxicillin and clavulanate potassium susc JONAS            <=            NRG   
     









 Complete blood count (CBC) with automated white blood cell (WBC) differential 
- 18 18:04         









 Blood leukocytes automated count (number/volume)            7.2 10*3/uL       
     4.3-11.0        

 

 Blood erythrocytes automated count (number/volume)            4.16 10*6/uL    
        4.35-5.85        

 

 Venous blood hemoglobin measurement (mass/volume)            12.9 g/dL        
    11.5-16.0        

 

 Blood hematocrit (volume fraction)            38 %            35-52        

 

 Automated erythrocyte mean corpuscular volume            91 [foz_us]          
  80-99        

 

 Automated erythrocyte mean corpuscular hemoglobin (mass per erythrocyte)      
      31 pg            25-34        

 

 Automated erythrocyte mean corpuscular hemoglobin concentration measurement (
mass/volume)            34 g/dL            32-36        

 

 Automated erythrocyte distribution width ratio            13.0 %            
10.0-14.5        

 

 Automated blood platelet count (count/volume)            164 10*3/uL          
  130-400        

 

 Automated blood platelet mean volume measurement            10.3 [foz_us]     
       7.4-10.4        

 

 Automated blood neutrophils/100 leukocytes            38 %            42-75   
     

 

 Automated blood lymphocytes/100 leukocytes            48 %            12-44   
     

 

 Blood monocytes/100 leukocytes            9 %            0-12        

 

 Automated blood eosinophils/100 leukocytes            4 %            0-10     
   

 

 Automated blood basophils/100 leukocytes            1 %            0-10        

 

 Blood neutrophils automated count (number/volume)            2.8 10*3         
   1.8-7.8        

 

 Blood lymphocytes automated count (number/volume)            3.5 10*3         
   1.0-4.0        

 

 Blood monocytes automated count (number/volume)            0.7 10*3            
0.0-1.0        

 

 Automated eosinophil count            0.3 10*3/uL            0.0-0.3        

 

 Automated blood basophil count (count/volume)            0.0 10*3/uL          
  0.0-0.1        









 Comprehensive metabolic panel - 18 18:04         









 Serum or plasma sodium measurement (moles/volume)            139 mmol/L       
     135-145        

 

 Serum or plasma potassium measurement (moles/volume)            3.8 mmol/L    
        3.6-5.0        

 

 Serum or plasma chloride measurement (moles/volume)            107 mmol/L     
               

 

 Carbon dioxide            20 mmol/L            21-32        

 

 Serum or plasma anion gap determination (moles/volume)            12 mmol/L   
         5-14        

 

 Serum or plasma urea nitrogen measurement (mass/volume)            26 mg/dL   
         7-18        

 

 Serum or plasma creatinine measurement (mass/volume)            0.90 mg/dL    
        0.60-1.30        

 

 Serum or plasma urea nitrogen/creatinine mass ratio            29             
NRG        

 

 Serum or plasma creatinine measurement with calculation of estimated 
glomerular filtration rate            60             NRG        

 

 Serum or plasma glucose measurement (mass/volume)            73 mg/dL         
           

 

 Serum or plasma calcium measurement (mass/volume)            9.8 mg/dL        
    8.5-10.1        

 

 Serum or plasma total bilirubin measurement (mass/volume)            0.4 mg/dL
            0.1-1.0        

 

 Serum or plasma alkaline phosphatase measurement (enzymatic activity/volume)  
          70 U/L                    

 

 Serum or plasma aspartate aminotransferase measurement (enzymatic activity/
volume)            17 U/L            5-34        

 

 Serum or plasma alanine aminotransferase measurement (enzymatic activity/volume
)            10 U/L            0-55        

 

 Serum or plasma protein measurement (mass/volume)            6.9 g/dL         
   6.4-8.2        

 

 Serum or plasma albumin measurement (mass/volume)            4.1 g/dL         
   3.2-4.5        

 

 CALCIUM CORRECTED            9.7 mg/dL            8.5-10.1        









 Lipase - 18 18:04         









 Lipase            51 U/L            8-78        



                                            



Encounters

      





 ACCT No.            Visit Date/Time            Discharge            Status    
        Pt. Type            Provider            Facility            Loc./Unit  
          Complaint        

 

 [id number]            2019 06:22:00            2019 14:08:00    
        DIS            Outpatient            JESUS HUIZAR DO            [facility]            PREOP            EGD        

 

 [id number]            2018 13:28:00            2018 23:59:59    
        CLS            Outpatient            MERNA DRIVER DO S          
  [facility]            RAD            UPPER ABDOMINAL 
PAIN, BOWEL CHANGES        

 

 [id number]            2018 16:21:00            2018 20:15:00    
        DIS            Emergency            LAW RICO MD            [facility]            ER            NOT FEELING WELL/ABD PAIN
        

 

 [id number]            2018 11:15:00            2018 13:09:00    
        DIS            Outpatient            ZAYRA STREETER APRN          
  [facility]            REHAB            ARTHRITIS AND 
STENOSIS        

 

 [id number]            2018 09:13:00            2018 23:59:59    
        CLS            Outpatient            MERNA DRIVER DO S          
  [facility]            RAD            R CERVICAL PAIN 
WITH R ARM RADICULOPATHY        

 

 [id number]            08/10/2017 14:45:00            08/10/2017 15:31:00    
        DIS            Outpatient            DORITA DRIVER DOQUELINE S          
  [facility]            REHAB            LOW BACK PAIN; 
LUMBAR DDD        

 

 [id number]            2017 09:08:00            2017 23:59:59    
        CLS            Outpatient            KATJA DRIVER DOLINE S          
  [facility]            RAD            RAMSES MASTALGIA     
   

 

 [id number]            2017 15:19:00            2017 19:29:00    
        DIS            Emergency            CLAUDETTE RIDER DO            [facility] 
[facility]            ER            FALL/COLLAR BONE INJ/R 
SIDE PAIN        

 

 [id number]            10/15/2015 13:58:00            10/15/2015 17:00:00    
        DIS            Outpatient            JESUS HUIZAR DO            [facility] 
[facility]            SDC            FAMILY HX COLON CA, 
QUESIONABLE BARRETTS        

 

 [id number]            04/15/2015 14:57:00            04/15/2015 15:33:00    
        DIS            Outpatient            MERNA DRIVER DO          
  [facility]            REHAB            THORACIC BACK 
PAIN /  SPASM        

 

 [id number]            2013 16:15:00            2013 18:56:00    
        DIS            Emergency            CAITIE ÁLVAREZ MD            
[facility]            ER            HEADACHE,EAR PAIN    
    

 

 [id number]            2019 14:00:00                         PEN       
     Preadmit            JESUS HUIZAR DO            [facility]            ENDO            GERD/EPIGASTRIC ABD PAIN        

 

 [id number]            10/12/2015 05:44:00                                   
   Document Registration                                                       
     

 

 [id number]            2015 19:30:00                                   
   Document Registration                                                       
     

 

 [id number]            04/15/2013 08:27:00                                   
   Document Registration                                                       
     

 

 [id number]            2012 12:55:00                                   
   Document Registration                                                       
     

 

 2018 06:10:41            2018 23:59:59          
  CLS            Outpatient            Merna Driver                  
                             

 

 [id number]            2015 05:54:17                         ACT            
Document Registration

## 2019-01-08 NOTE — PROGRESS NOTE-POST OPERATIVE
Post-Operative Progess Note


Surgeon (s)/Assistant (s)


Surgeon


JESUS HUIZAR DO


Assistant:  na





Pre-Operative Diagnosis


hiatal hernia, epigastric abdominal pain.





Post-Operative Diagnosis





inflammed polyp, hiatal hernia, reflux esophagitis





Procedure & Operative Findings


Date of Procedure


1/8/19


Procedure Performed/Findings


egd c biopsies


Anesthesia Type


per [facility]





Estimated Blood Loss


Estimated blood loss (mL):  none





Specimens/Packing


Specimens Removed


inflamed polyp, antrum, body, distal esophagus











JESUS HUIZAR DO Jan 8, 2019 08:23

## 2019-07-17 ENCOUNTER — HOSPITAL ENCOUNTER (OUTPATIENT)
Dept: HOSPITAL 75 - CR3 | Age: 79
LOS: 2 days | Discharge: HOME | End: 2019-07-19
Attending: FAMILY MEDICINE
Payer: MEDICARE

## 2019-07-17 DIAGNOSIS — Z29.8: Primary | ICD-10-CM

## 2019-08-21 ENCOUNTER — HOSPITAL ENCOUNTER (OUTPATIENT)
Dept: HOSPITAL 75 - CR3 | Age: 79
Discharge: HOME | End: 2019-08-21
Attending: FAMILY MEDICINE
Payer: MEDICARE

## 2019-08-21 DIAGNOSIS — Z29.8: Primary | ICD-10-CM

## 2019-09-11 ENCOUNTER — HOSPITAL ENCOUNTER (OUTPATIENT)
Dept: HOSPITAL 75 - CR3 | Age: 79
LOS: 14 days | Discharge: HOME | End: 2019-09-25
Attending: FAMILY MEDICINE
Payer: MEDICARE

## 2019-09-11 DIAGNOSIS — Z29.8: Primary | ICD-10-CM

## 2019-10-28 ENCOUNTER — HOSPITAL ENCOUNTER (OUTPATIENT)
Dept: HOSPITAL 75 - CR3 | Age: 79
LOS: 2 days | Discharge: HOME | End: 2019-10-30
Attending: FAMILY MEDICINE
Payer: MEDICARE

## 2019-10-28 DIAGNOSIS — Z29.8: Primary | ICD-10-CM

## 2020-01-08 ENCOUNTER — HOSPITAL ENCOUNTER (OUTPATIENT)
Dept: HOSPITAL 75 - RAD | Age: 80
End: 2020-01-08
Attending: FAMILY MEDICINE
Payer: MEDICARE

## 2020-01-08 DIAGNOSIS — M47.814: ICD-10-CM

## 2020-01-08 DIAGNOSIS — M47.812: Primary | ICD-10-CM

## 2020-01-08 PROCEDURE — 72040 X-RAY EXAM NECK SPINE 2-3 VW: CPT

## 2020-01-08 PROCEDURE — 72072 X-RAY EXAM THORAC SPINE 3VWS: CPT

## 2020-01-08 NOTE — DIAGNOSTIC IMAGING REPORT
INDICATION: 

Neck pain and back pain.



TIME OF EXAMINATION:    

10:16 AM.



FINDINGS:

The curvature of the cervical spine is normal. Minimal

anterolisthesis of C6 on C7 is noted. There is minimal

retrolisthesis of C5 on C6. Significant degenerative disc disease

at the C5-C6 level is noted with disc space narrowing and

marginal spurring. There is multilevel facet arthropathy. The

prevertebral tissues are normal. No fractures are seen. The

odontoid is limited in evaluation but no gross abnormality is

detected.



IMPRESSION: 

Cervical spondylosis. No acute bony abnormality is detected.



Dictated by: 



  Dictated on workstation # [id number]

## 2020-01-08 NOTE — DIAGNOSTIC IMAGING REPORT
INDICATION: 

Back pain.



TIME OF EXAMINATION:    

10:24 AM.



TECHNIQUE: 

Frontal, lateral, and swimmer's views of the thoracic spine were

obtained.



FINDINGS:

The curvature and alignment of the thoracic spine are normal. The

vertebral body heights are maintained. No acute compression

fracture is detected. There is generalized thoracic spondylosis.

The paraspinous line is intact. There are changes of median

sternotomy.



IMPRESSION: 

Thoracic spondylosis. No acute fracture is detected.



Dictated by: 



  Dictated on workstation # [id number]

## 2020-02-10 ENCOUNTER — HOSPITAL ENCOUNTER (EMERGENCY)
Dept: HOSPITAL 75 - ER | Age: 80
Discharge: HOME | End: 2020-02-10
Payer: MEDICARE

## 2020-02-10 VITALS — DIASTOLIC BLOOD PRESSURE: 47 MMHG | SYSTOLIC BLOOD PRESSURE: 102 MMHG

## 2020-02-10 VITALS — HEIGHT: 61.81 IN | WEIGHT: 216.93 LBS | BODY MASS INDEX: 39.92 KG/M2

## 2020-02-10 DIAGNOSIS — E78.00: ICD-10-CM

## 2020-02-10 DIAGNOSIS — I25.2: ICD-10-CM

## 2020-02-10 DIAGNOSIS — E66.9: ICD-10-CM

## 2020-02-10 DIAGNOSIS — I25.10: ICD-10-CM

## 2020-02-10 DIAGNOSIS — Z79.82: ICD-10-CM

## 2020-02-10 DIAGNOSIS — K21.9: ICD-10-CM

## 2020-02-10 DIAGNOSIS — F41.9: Primary | ICD-10-CM

## 2020-02-10 DIAGNOSIS — Z95.9: ICD-10-CM

## 2020-02-10 DIAGNOSIS — I10: ICD-10-CM

## 2020-02-10 DIAGNOSIS — Z88.8: ICD-10-CM

## 2020-02-10 LAB
ALBUMIN SERPL-MCNC: 4 GM/DL (ref 3.2–4.5)
ALP SERPL-CCNC: 68 U/L (ref 40–136)
ALT SERPL-CCNC: 15 U/L (ref 0–55)
APTT BLD: 26 SEC (ref 24–35)
BASOPHILS # BLD AUTO: 0 10^3/UL (ref 0–0.1)
BASOPHILS NFR BLD AUTO: 0 % (ref 0–10)
BILIRUB SERPL-MCNC: 0.3 MG/DL (ref 0.1–1)
BUN/CREAT SERPL: 15
CALCIUM SERPL-MCNC: 9.3 MG/DL (ref 8.5–10.1)
CHLORIDE SERPL-SCNC: 108 MMOL/L (ref 98–107)
CO2 SERPL-SCNC: 21 MMOL/L (ref 21–32)
CREAT SERPL-MCNC: 0.95 MG/DL (ref 0.6–1.3)
EOSINOPHIL # BLD AUTO: 0.3 10^3/UL (ref 0–0.3)
EOSINOPHIL NFR BLD AUTO: 4 % (ref 0–10)
ERYTHROCYTE [DISTWIDTH] IN BLOOD BY AUTOMATED COUNT: 13.3 % (ref 10–14.5)
GFR SERPLBLD BASED ON 1.73 SQ M-ARVRAT: 57 ML/MIN
GLUCOSE SERPL-MCNC: 105 MG/DL (ref 70–105)
HCT VFR BLD CALC: 38 % (ref 35–52)
HGB BLD-MCNC: 12.2 G/DL (ref 11.5–16)
INR PPP: 0.9 (ref 0.8–1.4)
LYMPHOCYTES # BLD AUTO: 3.4 X 10^3 (ref 1–4)
LYMPHOCYTES NFR BLD AUTO: 48 % (ref 12–44)
MAGNESIUM SERPL-MCNC: 2 MG/DL (ref 1.6–2.4)
MANUAL DIFFERENTIAL PERFORMED BLD QL: NO
MCH RBC QN AUTO: 30 PG (ref 25–34)
MCHC RBC AUTO-ENTMCNC: 32 G/DL (ref 32–36)
MCV RBC AUTO: 92 FL (ref 80–99)
MONOCYTES # BLD AUTO: 0.6 X 10^3 (ref 0–1)
MONOCYTES NFR BLD AUTO: 8 % (ref 0–12)
NEUTROPHILS # BLD AUTO: 2.8 X 10^3 (ref 1.8–7.8)
NEUTROPHILS NFR BLD AUTO: 40 % (ref 42–75)
PLATELET # BLD: 167 10^3/UL (ref 130–400)
PMV BLD AUTO: 10.6 FL (ref 7.4–10.4)
POTASSIUM SERPL-SCNC: 3.8 MMOL/L (ref 3.6–5)
PROT SERPL-MCNC: 6.7 GM/DL (ref 6.4–8.2)
PROTHROMBIN TIME: 12.7 SEC (ref 12.2–14.7)
SODIUM SERPL-SCNC: 140 MMOL/L (ref 135–145)
WBC # BLD AUTO: 7 10^3/UL (ref 4.3–11)

## 2020-02-10 PROCEDURE — 84484 ASSAY OF TROPONIN QUANT: CPT

## 2020-02-10 PROCEDURE — 71045 X-RAY EXAM CHEST 1 VIEW: CPT

## 2020-02-10 PROCEDURE — 83880 ASSAY OF NATRIURETIC PEPTIDE: CPT

## 2020-02-10 PROCEDURE — 83735 ASSAY OF MAGNESIUM: CPT

## 2020-02-10 PROCEDURE — 85730 THROMBOPLASTIN TIME PARTIAL: CPT

## 2020-02-10 PROCEDURE — 36415 COLL VENOUS BLD VENIPUNCTURE: CPT

## 2020-02-10 PROCEDURE — 85025 COMPLETE CBC W/AUTO DIFF WBC: CPT

## 2020-02-10 PROCEDURE — 85610 PROTHROMBIN TIME: CPT

## 2020-02-10 PROCEDURE — 93041 RHYTHM ECG TRACING: CPT

## 2020-02-10 PROCEDURE — 93005 ELECTROCARDIOGRAM TRACING: CPT

## 2020-02-10 PROCEDURE — 83874 ASSAY OF MYOGLOBIN: CPT

## 2020-02-10 PROCEDURE — 80053 COMPREHEN METABOLIC PANEL: CPT

## 2020-02-10 NOTE — DIAGNOSTIC IMAGING REPORT
Hypertension



Portable upright AP view of the chest is obtained with comparison

made study of 03/12/2017.



Heart size is at the upper limits of normal. Pulmonary

vascularity is unremarkable. There is probable linear scarring in

the left base. Otherwise, no pneumothorax, consolidation or

adverse change is identified.



IMPRESSION: No acute abnormality.



Dictated by: 



  Dictated on workstation # [id number]

## 2020-02-10 NOTE — ED GENERAL
General


Stated Complaint:  BLOOD PRESSURE HIGH,HEART RACING


Source of Information:  Patient


Exam Limitations:  No Limitations





History of Present Illness


Date Seen by Provider:  Feb 10, 2020


Time Seen by Provider:  20:00


Initial Comments


To ER with reports of high blood pressure, sensation of palpitations. She took 

0.125 mg of alprazolam at about 1915, a metoprolol at 1900. She feels anxious 

because she got some "bad news about her son".


Timing/Duration:  1-2 Days


Severity:  Moderate


Associated Systoms:  Denies Symptoms





Allergies and Home Medications


Allergies


Coded Allergies:  


     quinine (Verified  Allergy, Mild, RASH, 1/3/19)





Home Medications


Acetaminophen 500 Mg Tablet, 500 MG PO HS, (Reported)


Alprazolam 0.25 Mg Tablet, 0.25 MG PO BID, (Reported)


Aspirin 81 Mg Tab.chew, 81 MG PO DAILY, (Reported)


Famotidine 10 Mg Tablet, 10 MG PO BID


   Prescribed by: JESUS HUIZAR on 19 0813


Isosorbide Mononitrate 30 Mg Tab.er.24h, 30 MG PO DAILY, (Reported)


Metoprolol Tartrate 50 Mg Tablet, 25 MG PO BID, (Reported)


Multivitamin 1 Each Capsule, 1 EACH PO DAILY, (Reported)


Omega 3 Polyunsat Fatty Acids 1,000 Mg Cap, 1,000 MG PO DAILY, (Reported)


Pantoprazole Sodium 40 Mg Tablet.dr, 40 MG PO BID PRN for HEARTBURN, (Reported)


Simvastatin 40 Mg Tablet, 40 MG PO HS, (Reported)


Sucralfate 1 Gm Tablet, 1 GM PO PRN PRN for HEARTBURN, (Reported)





Patient Home Medication List


Home Medication List Reviewed:  Yes





Review of Systems


Review of Systems


Constitutional:  see HPI


EENTM:  see HPI


Respiratory:  see HPI, short of breath


Cardiovascular:  see HPI, palpitations


Genitourinary:  no symptoms reported


Musculoskeletal:  no symptoms reported


Skin:  no symptoms reported


Psychiatric/Neurological:  No Symptoms Reported





Past Medical-Social-Family Hx


Patient Social History


2nd Hand Smoke Exposure:  No


Recent Foreign Travel:  No


Contact w/Someone Who Travel:  No


Recent Hopitalizations:  No





Immunizations Up To Date


Date of Pneumonia Vaccine:  Dec 6, 2010


Date of Influenza Vaccine:  Oct 8, 2018





Seasonal Allergies


Seasonal Allergies:  No





Past Medical History


Surgeries:  Yes


Abdominal, Appendectomy, Cardiac, CABG, Gallbladder, Hysterectomy, Joint 

Replacement, Orthopedic


Respiratory:  No


Cardiac:  Yes (CLEAN HEART CATH-OCT 2018)


Coronary Artery Disease, Heart Attack, High Cholesterol, Hypertension, 

Peripheral Vascular


Neurological:  No


Reproductive Disorders:  No


GYN History:  Hysterectomy, Menopausal


Sexually Transmitted Disease:  No


Genitourinary:  Yes


UTI-Chronic


Gastrointestinal:  Yes (SPASTIC COLON/HIATAL HERNIA/GI BLEED-ULCERS, gastric 

polyp)


Gastroesophageal Reflux, Gastrointestinal Bleed, Diverticulosis, Hiatal Hernia, 

Ulcer


Musculoskeletal:  Yes (RIGHT KNEE REPLACEMENT)


Arthritis


Endocrine:  Yes ("DIET CONTROLLED", OBESITY)


Diabetes, Non-Insulin dep


HEENT:  No


Cancer:  No


Psychosocial:  Yes


Anxiety


Integumentary:  No


Blood Disorders:  No


Adverse Reaction/Blood Tranf:  No





Physical Exam


Vital Signs





Vital Signs - First Documented








 2/10/20





 19:46


 


Temp 36.7


 


Pulse 78


 


Resp 20


 


B/P (MAP) 240/105 (150)





Capillary Refill :


Height, Weight, BMI


Height: 5'2.00"


Weight: 210lbs. 6.0oz. 95.485425bz; 38.5 BMI


Method:Stated


General Appearance:  No Apparent Distress, WD/WN


Eyes:  Bilateral Eye Normal Inspection, Bilateral Eye PERRL, Bilateral Eye EOMI


HEENT:  PERRL/EOMI, TMs Normal


Neck:  Full Range of Motion, Normal Inspection


Respiratory:  No Accessory Muscle Use, No Respiratory Distress


Cardiovascular:  Regular Rate, Rhythm, Normal Peripheral Pulses


Gastrointestinal:  Normal Bowel Sounds, Non Tender, Soft


Extremity:  Normal Capillary Refill, Normal Inspection


Neurologic/Psychiatric:  Alert, Oriented x3


Skin:  Normal Color, Warm/Dry





Progress/Results/Core Measures


Suspected Sepsis


SIRS


Temperature: 


Pulse:  


Respiratory Rate: 


 


Laboratory Tests


2/10/20 20:25: White Blood Count 7.0


Blood Pressure  / 


Mean: 


 





Laboratory Tests


2/10/20 20:25: 


Creatinine 0.95, INR Comment 0.9, Platelet Count 167, Total Bilirubin 0.3








Results/Orders


Lab Results





Laboratory Tests








Test


 2/10/20


20:25 Range/Units


 


 


White Blood Count


 7.0 


 4.3-11.0


10^3/uL


 


Red Blood Count


 4.10 L


 4.35-5.85


10^6/uL


 


Hemoglobin 12.2  11.5-16.0  G/DL


 


Hematocrit 38  35-52  %


 


Mean Corpuscular Volume 92  80-99  FL


 


Mean Corpuscular Hemoglobin 30  25-34  PG


 


Mean Corpuscular Hemoglobin


Concent 32 


 32-36  G/DL





 


Red Cell Distribution Width 13.3  10.0-14.5  %


 


Platelet Count


 167 


 130-400


10^3/uL


 


Mean Platelet Volume 10.6 H 7.4-10.4  FL


 


Neutrophils (%) (Auto) 40 L 42-75  %


 


Lymphocytes (%) (Auto) 48 H 12-44  %


 


Monocytes (%) (Auto) 8  0-12  %


 


Eosinophils (%) (Auto) 4  0-10  %


 


Basophils (%) (Auto) 0  0-10  %


 


Neutrophils # (Auto) 2.8  1.8-7.8  X 10^3


 


Lymphocytes # (Auto) 3.4  1.0-4.0  X 10^3


 


Monocytes # (Auto) 0.6  0.0-1.0  X 10^3


 


Eosinophils # (Auto)


 0.3 


 0.0-0.3


10^3/uL


 


Basophils # (Auto)


 0.0 


 0.0-0.1


10^3/uL


 


Prothrombin Time 12.7  12.2-14.7  SEC


 


INR Comment 0.9  0.8-1.4  


 


Activated Partial


Thromboplast Time 26 


 24-35  SEC





 


Sodium Level 140  135-145  MMOL/L


 


Potassium Level 3.8  3.6-5.0  MMOL/L


 


Chloride Level 108 H   MMOL/L


 


Carbon Dioxide Level 21  21-32  MMOL/L


 


Anion Gap 11  5-14  MMOL/L


 


Blood Urea Nitrogen 14  7-18  MG/DL


 


Creatinine


 0.95 


 0.60-1.30


MG/DL


 


Estimat Glomerular Filtration


Rate 57 


  





 


BUN/Creatinine Ratio 15   


 


Glucose Level 105    MG/DL


 


Calcium Level 9.3  8.5-10.1  MG/DL


 


Corrected Calcium 9.3  8.5-10.1  MG/DL


 


Magnesium Level 2.0  1.6-2.4  MG/DL


 


Total Bilirubin 0.3  0.1-1.0  MG/DL


 


Aspartate Amino Transf


(AST/SGOT) 17 


 5-34  U/L





 


Alanine Aminotransferase


(ALT/SGPT) 15 


 0-55  U/L





 


Alkaline Phosphatase 68    U/L


 


Myoglobin


 52.2 


 10.0-92.0


NG/ML


 


Troponin I < 0.028  <0.028  NG/ML


 


B-Type Natriuretic Peptide 322.6 H <100.0  PG/ML


 


Total Protein 6.7  6.4-8.2  GM/DL


 


Albumin 4.0  3.2-4.5  GM/DL








My Orders





Orders - CORNELIA WALKER


Cbc With Automated Diff (2/10/20 19:57)


Magnesium (2/10/20 19:57)


Chest 1 View, Ap/Pa Only (2/10/20 19:57)


Ekg Tracing (2/10/20 19:57)


Comprehensive Metabolic Panel (2/10/20 19:57)


Myoglobin Serum (2/10/20 19:57)


Protime With Inr (2/10/20 19:57)


Partial Thromboplastin Time (2/10/20 19:57)


O2 (2/10/20 19:57)


Lipid Panel (20 06:00)


Ed Iv/Invasive Line Start (2/10/20 19:57)


BNP (2/10/20 19:57)


Troponin I (2/10/20 19:57)


Aspirin Chewable Tablet (Baby Aspirin Ch (2/10/20 20:00)


Alprazolam Tablet (Xanax Tablet) (2/10/20 20:00)


Clonidine  Tablet (Catapres  Tablet) (2/10/20 20:00)





Medications Given in ED





Current Medications








 Medications  Dose


 Ordered  Sig/Geraldine


 Route  Start Time


 Stop Time Status Last Admin


Dose Admin


 


 Alprazolam  0.125 mg  ONCE  ONCE


 PO  2/10/20 20:00


 2/10/20 20:01 DC 2/10/20 20:04


0.125 MG


 


 Aspirin  324 mg  ONCE  ONCE


 PO  2/10/20 20:00


 2/10/20 20:01 DC 2/10/20 20:04


324 MG


 


 Clonidine HCl  0.2 mg  ONCE  ONCE


 PO  2/10/20 20:00


 2/10/20 20:01 DC 2/10/20 20:04


0.2 MG








Vital Signs/I&O











 2/10/20





 19:46


 


Temp 36.7


 


Pulse 78


 


Resp 20


 


B/P (MAP) 240/105 (150)





Capillary Refill :





Diagnostic Imaging





   Diagonstic Imaging:  Xray


Comments


NAME:   OLEG FOWLERHUBER LOMAX


[employer] REC#:   [id number]


ACCOUNT#:   [id number]


PT STATUS:   REG ER


:   1940


PHYSICIAN:   CORNELIA WALKER APRN


ADMIT DATE:   02/10/20/ER


                                   ***Draft***


Date of Exam:02/10/20





CHEST 1 VIEW, AP/PA ONLY








Hypertension





Portable upright AP view of the chest is obtained with comparison


made study of 2017.





Heart size is at the upper limits of normal. Pulmonary


vascularity is unremarkable. There is probable linear scarring in


the left base. Otherwise, no pneumothorax, consolidation or


adverse change is identified.





IMPRESSION: No acute abnormality.





  Dictated on workstation # [id number]








Dict:   02/10/20 2049


Trans:   02/10/20 2051


[facility] [id number]





Interpreted by:     BREEZY HERNANDEZ MD


Electronically signed by:





Departure


Communication (Admissions)


Blood pressure 109, sitting up in bed alert and oriented very pleasant states 

she is feeling much better.





Impression





   Primary Impression:  


   Anxiety


Disposition:  01 HOME, SELF-CARE


Condition:  Stable





Departure-Patient Inst.


Decision time for Depature:  21:35


Referrals:  


MERNA HUMPHRIES DO (PCP/Family)


Primary Care Physician


Patient Instructions:  Anxiety, Adult (DC), Palpitations (DC)





Add. Discharge Instructions:  


1. Follow-up with your cardiologist. Call tomorrow to make an appointment. 

Return to ER for any concerns





Copy


Copies To 1:   MERNA HUMPHRIES PETER J APRN             Feb 10, 2020 20:05

## 2020-02-19 NOTE — XMS REPORT
CCD document using C-CDA

                             Created on: 2019



Leilani Ramírez

External Reference #: [id number]

: 1940

Sex: Female



Demographics





                          Address                   [address] [place]  [address]

 

                          Home Phone                [phone]

 

                          Preferred Language        English

 

                          Marital Status            

 

                          Temple Affiliation     Unknown

 

                          Race                      White

 

                          Ethnic Group              Not  or 





Author





                          Author                    Leilani Driver D.O.

 

                          Organization              [facility]

 

                          Address                   [address] [place]  [address]



 

                          Phone                     [phone]







Care Team Providers





                    Care Team Member Name Role                Phone

 

                    Akanksha Driver D.O. PP                  Unavailable

 

                          CCM                       Unavailable



                                            



Summary Purpose

          Interface Exchange                                                    
               



Insurance Providers

                      



                    Payer name                   Policy type / Coverage type    

               

Covered party ID                   Effective Begin Date                   

Effective End Date                

 

                    [employer] [place]

                   

[id number]                   2018                   Unknown                

 

                    [employer]                   Medicare Part B 

                  

[id number]                   [id number]                   Unknown                



                                                                                
       



Family history

                      



Father            



                          Diagnosis                   Age At Onset              

  

 

                          Heart disease                   Unknown               

 



            



Mother            



                          Diagnosis                   Age At Onset              

  

 

                          Heart disease                   Unknown               

 

 

                          Cancer                    Unknown                



                                                                                
       



Social History

                      



                    Social History Element                   Codes              

     Description    

                                        Effective Dates                

 

                    Tobacco history                   SNOMED CT: [id number]      

             Never 

smoker                                  2011                

 

                    Marital status                   Unknown                   S

daniela               

                                        2010                

 

                    Number of children                   Unknown                

   9                

                                        2010                



                                                                                
                           



Allergies, Adverse Reactions, Alerts

                      



                Substance                   Reaction                   Codes    

               

Entered Date                   Inactivated Date                   Status        

       

 

                                                            * NO KNOWN FOOD STAR

RGIES                                   

                                    Unknown                   2010        

           No 

Inactive Date                           Active                

 

                                        _                                       

                  

Unknown                   2010                   No Inactive Date         

                                        Active                

 

                                        _                                       

                  

Unknown                   2019                   No Inactive Date         

                                        Active                

 

                                                            QUINIDINE-QUININE AN

ALOGUES                                 

                    hives,                    Unknown                   20

10               

                          No Inactive Date                   Active             

   



                                                                                
                           



Past Medical History

                      



                    Illness                   Codes                   Condition 

Status              

                          Onset Date                   Resolved Date            

    

 

                                                            Acute serous otitis 

media, left ear                         

                                        ICD-9: 381.01

ICD-10: H65.02                   Active                    2019           

 

                                        Unknown                

 

                                                            Coronary atheroscler

osis due to calcified coronary lesion   

                                        ICD-9: 414.00

ICD-10: I25.84                   Active                    2018           

 

                                        Unknown                

 

                                                            Essential (primary) 

hypertension                            

                                        ICD-9: 401.9

ICD-10: I10                   Active                    2014              

 

                                        Unknown                

 

                                                            Hypoglycemia, unspec

ified                                   

                                        ICD-9: 251.2

ICD-10: E16.2                   Active                    2017            

 

                                        Unknown                

 

                                              Other fatigue                     

                ICD-9: 

780.79

ICD-10: R53.83                   Active                    2017           

 

                                        Unknown                

 

                                                            Urinary tract infect

ion, site not specified                 

                                        ICD-9: 599.0

ICD-10: N39.0                   Active                    10/02/2018            

 

                                        Unknown                

 

                                                            Gastro-esophageal re

flux disease without esophagitis        

                                        ICD-9: 530.81

ICD-10: K21.9                   Active                    2018            

 

                                        Unknown                

 

                                              Epigastric pain                   

                  ICD-9: 

789.06

ICD-10: R10.13                   Active                    2018           

 

                                        Unknown                

 

                                                            Atherosclerotic hear

t disease of native coronary artery 

without angina pectoris                                     ICD-9: 414.00

ICD-10: I25.10                   Active                    2018           

 

                                        Unknown                

 

                                                            Generalized anxiety 

disorder                                

                                        ICD-9: 300.00

ICD-10: F41.1                   Active                    2018            

 

                                        Unknown                

 

                                              Palpitations                      

               ICD-9: 

785.1

ICD-10: R00.2                   Active                    10/02/2018            

 

                                        Unknown                

 

                                              Dizziness and giddiness           

                          

ICD-9: 780.4

ICD-10: R42                   Active                    2014              

 

                                        Unknown                

 

                                                            Occlusion and stenos

is of bilateral carotid arteries        

                                        ICD-9: 433.10

ICD-10: I65.23                   Active                    2018           

 

                                        Unknown                

 

                                              FLU VACCINE                       

              ICD-9: 

V04.81

ICD-10: Z23                   Active                    2012              

 

                                        Unknown                

 

                                              Cervicalgia                       

              ICD-9: 723.1

ICD-10: M54.2                   Active                    2018            

 

                                        Unknown                

 

                                                            Other spondylosis wi

th radiculopathy, cervical region       

                                        ICD-9: 721.0

ICD-10: M47.22                   Active                    2018           

 

                                        Unknown                

 

                                              Cervicocranial syndrome           

                          

ICD-9: 723.2

ICD-10: M53.0                   Active                    2018            

 

                                        Unknown                

 

                                                            Vertigo of central o

rigin, bilateral                        

                                        ICD-9: 386.2

ICD-10: H81.43                   Active                    2018           

 

                                        Unknown                

 

                                                            Benign paroxysmal ve

rtigo, bilateral                        

                                        ICD-9: 386.11

ICD-10: H81.13                   Active                    2018           

 

                                        Unknown                

 

                                              Otalgia, bilateral                

                     ICD-

9: 388.70

ICD-10: H92.03                   Active                    2018           

 

                                        Unknown                

 

                                                            Left lower quadrant 

pain                                    

                                        ICD-9: 789.04

ICD-10: R10.32                   Active                    2017           

 

                                        Unknown                

 

                                              Low back pain                     

                ICD-9: 

724.2

ICD-10: M54.5                   Active                    2017            

 

                                        Unknown                

 

                                                            Radiculopathy, lumbo

sacral region                           

                                        ICD-9: 724.4

ICD-10: M54.17                   Active                    2018           

 

                                        Unknown                

 

                                                            Impaired fasting glu

cose                                    

                                        ICD-9: 790.29

ICD-10: R73.01                   Active                    2012           

 

                                        Unknown                

 

                                              Mixed hyperlipidemia              

                       

ICD-9: 272.4

ICD-10: E78.2                   Active                    2017            

 

                                        Unknown                

 

                                                            Other intervertebral

 disc degeneration, lumbar region       

                                        ICD-9: 722.52

ICD-10: M51.36                   Active                    2017           

 

                                        Unknown                

 

                                              Pain in thoracic spine            

                         

ICD-9: 724.1

ICD-10: M54.6                   Active                    2017            

 

                                        Unknown                

 

                                              Mastodynia                        

             ICD-9: 611.71

ICD-10: N64.4                   Active                    2017            

 

                                        Unknown                

 

                                                            Acute upper respirat

ory infection, unspecified              

                                        ICD-9: 465.9

ICD-10: J06.9                   Active                    2017            

 

                                        Unknown                

 

                                                            Acute mastoiditis wi

thout complications, right ear          

                                        ICD-9: 383.00

ICD-10: H70.001                   Active                    2016          

 

                                        Unknown                

 

                                                            Constipation, unspec

ified                                   

                                        ICD-9: 564.00

ICD-10: K59.00                   Active                    2016           

 

                                        Unknown                

 

                                                            Chronic kidney disea

se, stage 1                             

                                        ICD-9: 585.1

ICD-10: N18.1                   Active                    03/15/2016            

 

                                        Unknown                

 

                                              History of falling                

                     ICD-

9: V15.88

ICD-10: Z91.81                   Active                    12/15/2015           

 

                                        Unknown                

 

                                                            Muscle weakness (gen

eralized)                               

                                        ICD-9: 780.79

ICD-10: M62.81                   Active                    2015           

 

                                        Unknown                

 

                                              Acute renal failure               

                      ICD-

9: 584.9                   Active                    2015                 

 

                                        Unknown                

 

                                              POLYURIA                          

           ICD-9: 788.42  

                    Active                   2015                   Unknow

n   

            

 

                                              CAD                               

      ICD-9: 414.00       

                    Active                   09/10/2015                   Unknow

n        

       

 

                                              Hyperglycemia                     

                ICD-9: 

790.29                   Active                   2012                   

Unknown                

 

                                              HYPERLIPIDEMIA NEC/NOS            

                         

ICD-9: 272.4                   Active                    09/10/2015             

 

                                        Unknown                

 

                                              ABDOMINAL PAIN                    

                 ICD-9: 

789.00                   Active                   10/10/2012                   

Unknown                

 

                                              Grieving                          

           ICD-9: 309.0   

                    Active                   2015                   Unknow

n    

           

 

                                              HYPOGLYCEMIA                      

               ICD-9: 

251.2                   Active                   2012                   

Unknown                

 

                                              MALAISE AND FATIGUE               

                      ICD-

9: 780.79                   Active                    2015                

 

                                        Unknown                

 

                                              CERUMEN IMPACTION                 

                    ICD-9:

380.4                   Active                   2015                   

Unknown                

 

                                              Leg pain                          

           ICD-9: 729.5   

                    Active                   2015                   Unknow

n    

           

 

                                              SUPERFIC PHLEBITIS-LEG            

                         

ICD-9: 451.0                   Active                    2015             

 

                                        Unknown                

 

                                              SPASM OF MUSCLE                   

                  ICD-9: 

728.85                   Active                   2015                   

Unknown                

 

                                              Thoracic back pain                

                     ICD-

9: 724.1                   Active                    2015                 

 

                                        Unknown                

 

                                                            Benign positional ve

rtigo                                   

                    ICD-9: 386.11                   Active                               

                                        Unknown                

 

                                              Suspicious nevus                  

                   ICD-9: 

238.2                   Active                   2015                   

Unknown                

 

                                                            EUSTACHIAN TUBE DYSF

UNCTION                                 

                    ICD-9: 381.81                   Active                             

                                        Unknown                

 

                                              SINUSITIS, ACUTE                  

                   ICD-9: 

461.9                   Active                   2014                   

Unknown                

 

                                              DIZZINESS/VERTIGO                 

                    ICD-9:

780.4                   Active                   2014                   

Unknown                

 

                                              HYPERTENSION                      

               ICD-9: 

401.9                   Active                   2014                   

Unknown                

 

                                              URI, ACUTE                        

             ICD-9: 465.9 

                    Active                   10/15/2013                   Unknow

n  

             

 

                                              CEPHALGIA                         

            ICD-9: 784.0  

                    Active                   2013                   Unknow

n   

            

 

                                              OTITIS MEDIA NOS                  

                   ICD-9: 

382.9                   Active                   2013                   

Unknown                

 

                                              Perforation of ear drum           

                          

ICD-9: 384.20                   Active                    05/10/2013            

 

                                        Unknown                

 

                                              Mastalgia                         

            ICD-9: 611.71 

                    Active                   2013                   Unknow

n  

             

 

                                              DYSPEPSIA                         

            ICD-9: 536.8  

                    Active                   10/10/2012                   Unknow

n   

            

 

                                              IBS                               

      ICD-9: 564.1        

                    Active                   10/10/2012                   Unknow

n         

      

 

                                              FLU VACCINE                       

              ICD-9: 

V04.81                   Active                   2012                   

Unknown                

 

                                              Radiculopathy of leg              

                       

ICD-9: 724.4                   Active                    2012             

 

                                        Unknown                

 

                                              SCIATICA                          

           ICD-9: 724.3   

                    Active                   2012                   Unknow

n    

           

 

                                                            Lumbar degenerative 

disc disease                            

                    ICD-9: 722.52                   Active                        

                                        Unknown                

 

                                              Sacroiliac dysfunction            

                         

ICD-9: 739.4                   Active                    2012             

 

                                        Unknown                

 

                                              Hypertension                      

               Unknown    

                    Active                   2012                   Unknow

n     

          

 

                                              PAIN, LOWER BACK                  

                   ICD-9: 

724.2                   Active                   2011                   

Unknown                

 

                                              SPINAL ENTHESOPATHY               

                      ICD-

9: 720.1                   Active                    2011                 

 

                                        Unknown                

 

                                              Hypotension                       

              ICD-9: 458.9

                    Active                   2011                   Unknow

n 

              

 

                                              SACROILIITIS NEC                  

                   ICD-9: 

720.2                   Active                   2011                   

Unknown                

 

                                              PHARYNGITIS, ACUTE                

                     ICD-

9: 462                   Active                   2010                   

Unknown                

 

                                              URINARY TRACT INFECTION           

                          

ICD-9: 599.0                   Active                    2010             

 

                                        Unknown                

 

                                              Heat exhaustion                   

                  ICD-9: 

992.5                   Active                   2010                   

Unknown                

 

                                              Esophagitis                       

              ICD-9: 

530.10                   Active                   2010                   

Unknown                

 

                                              Gastritis                         

            ICD-9: 535.50 

                    Active                   2010                   Unknow

n  

             

 

                                              GERD                              

       ICD-9: 530.81      

                    Active                   2010                   Unknow

n       

        

 

                                              Hiatal hernia                     

                ICD-9: 

553.3                   Active                   2010                   

Unknown                

 

                                              B12 DEFIC ANEMIA NEC              

                       

ICD-9: 281.1                   Active                    2010             

 

                                        Unknown                

 

                                              Anxiety                           

          Unknown         

                    Active                   2010                   Unknow

n          

     

 

                                              Heart disease                     

                Unknown   

                    Active                   2010                   Unknow

n    

           

 

                                              Hyperlipidemia                    

                 Unknown  

                    Active                   2010                   Unknow

n   

            

 

                                              ANXIETY STATE NOS                 

                    ICD-9:

300.00                   Active                   2010                   

Unknown                

 

                                              DEPRESSIVE DISORDER NEC           

                          

ICD-9: 311                   Active                    2010               

 

                                        Unknown                



                                                                                
                                                                                
                                                                                
                                                                                
                                                                                
                                                                                
                                                                                
                                                                                
                                                                                
                                                                                
                                                                                
                           



Problems

                      



                    Condition                   Codes                   Effectiv

e Dates             

                                        Condition Status                

 

                                                            Acute serous otitis 

media, left ear                         

                                        ICD-9: 381.01

ICD-10: H65.02                   2019                   Active            

   

 

                                                            Coronary atheroscler

osis due to calcified coronary lesion   

                                        ICD-9: 414.00

ICD-10: I25.84                   2018                   Active            

   

 

                                                            Essential (primary) 

hypertension                            

                                        ICD-9: 401.9

ICD-10: I10                   2014                   Active               



 

                                                            Hypoglycemia, unspec

ified                                   

                                        ICD-9: 251.2

ICD-10: E16.2                   2017                   Active             

  

 

                                              Other fatigue                     

                ICD-9: 

780.79

ICD-10: R53.83                   2017                   Active            

   

 

                                                            Urinary tract infect

ion, site not specified                 

                                        ICD-9: 599.0

ICD-10: N39.0                   10/02/2018                   Active             

  

 

                                                            Gastro-esophageal re

flux disease without esophagitis        

                                        ICD-9: 530.81

ICD-10: K21.9                   2018                   Active             

  

 

                                              Epigastric pain                   

                  ICD-9: 

789.06

ICD-10: R10.13                   2018                   Active            

   

 

                                                            Atherosclerotic hear

t disease of native coronary artery 

without angina pectoris                                     ICD-9: 414.00

ICD-10: I25.10                   2018                   Active            

   

 

                                                            Generalized anxiety 

disorder                                

                                        ICD-9: 300.00

ICD-10: F41.1                   2018                   Active             

  

 

                                              Palpitations                      

               ICD-9: 

785.1

ICD-10: R00.2                   10/02/2018                   Active             

  

 

                                              Dizziness and giddiness           

                          

ICD-9: 780.4

ICD-10: R42                   2014                   Active               



 

                                                            Occlusion and stenos

is of bilateral carotid arteries        

                                        ICD-9: 433.10

ICD-10: I65.23                   2018                   Active            

   

 

                                              FLU VACCINE                       

              ICD-9: 

V04.81

ICD-10: Z23                   2012                   Active               



 

                                              Cervicalgia                       

              ICD-9: 723.1

ICD-10: M54.2                   2018                   Active             

  

 

                                                            Other spondylosis wi

th radiculopathy, cervical region       

                                        ICD-9: 721.0

ICD-10: M47.22                   2018                   Active            

   

 

                                              Cervicocranial syndrome           

                          

ICD-9: 723.2

ICD-10: M53.0                   2018                   Active             

  

 

                                                            Vertigo of central o

rigin, bilateral                        

                                        ICD-9: 386.2

ICD-10: H81.43                   2018                   Active            

   

 

                                                            Benign paroxysmal ve

rtigo, bilateral                        

                                        ICD-9: 386.11

ICD-10: H81.13                   2018                   Active            

   

 

                                              Otalgia, bilateral                

                     ICD-

9: 388.70

ICD-10: H92.03                   2018                   Active            

   

 

                                                            Left lower quadrant 

pain                                    

                                        ICD-9: 789.04

ICD-10: R10.32                   2017                   Active            

   

 

                                              Low back pain                     

                ICD-9: 

724.2

ICD-10: M54.5                   2017                   Active             

  

 

                                                            Radiculopathy, lumbo

sacral region                           

                                        ICD-9: 724.4

ICD-10: M54.17                   2018                   Active            

   

 

                                                            Impaired fasting glu

cose                                    

                                        ICD-9: 790.29

ICD-10: R73.01                   2012                   Active            

   

 

                                              Mixed hyperlipidemia              

                       

ICD-9: 272.4

ICD-10: E78.2                   2017                   Active             

  

 

                                                            Other intervertebral

 disc degeneration, lumbar region       

                                        ICD-9: 722.52

ICD-10: M51.36                   2017                   Active            

   

 

                                              Pain in thoracic spine            

                         

ICD-9: 724.1

ICD-10: M54.6                   2017                   Active             

  

 

                                              Mastodynia                        

             ICD-9: 611.71

ICD-10: N64.4                   2017                   Active             

  

 

                                                            Acute upper respirat

ory infection, unspecified              

                                        ICD-9: 465.9

ICD-10: J06.9                   2017                   Active             

  

 

                                                            Acute mastoiditis wi

thout complications, right ear          

                                        ICD-9: 383.00

ICD-10: H70.001                   2016                   Active           

    

 

                                                            Constipation, unspec

ified                                   

                                        ICD-9: 564.00

ICD-10: K59.00                   2016                   Active            

   

 

                                                            Chronic kidney disea

se, stage 1                             

                                        ICD-9: 585.1

ICD-10: N18.1                   03/15/2016                   Active             

  

 

                                              History of falling                

                     ICD-

9: V15.88

ICD-10: Z91.81                   12/15/2015                   Active            

   

 

                                                            Muscle weakness (gen

eralized)                               

                                        ICD-9: 780.79

ICD-10: M62.81                   2015                   Active            

   

 

                                              Acute renal failure               

                      ICD-

9: 584.9                   2015                   Active                

 

                                              POLYURIA                          

           ICD-9: 788.42  

                          2015                   Active                

 

                                              CAD                               

      ICD-9: 414.00       

                          09/10/2015                   Active                

 

                                              Hyperglycemia                     

                ICD-9: 

790.29                    2012                   Active                

 

                                              HYPERLIPIDEMIA NEC/NOS            

                         

ICD-9: 272.4                   09/10/2015                   Active              

 

 

                                              ABDOMINAL PAIN                    

                 ICD-9: 

789.00                    10/10/2012                   Active                

 

                                              Grieving                          

           ICD-9: 309.0   

                          2015                   Active                

 

                                              HYPOGLYCEMIA                      

               ICD-9: 

251.2                     2012                   Active                

 

                                              MALAISE AND FATIGUE               

                      ICD-

9: 780.79                   2015                   Active                

 

                                              CERUMEN IMPACTION                 

                    ICD-9:

380.4                     2015                   Active                

 

                                              Leg pain                          

           ICD-9: 729.5   

                          2015                   Active                

 

                                              SUPERFIC PHLEBITIS-LEG            

                         

ICD-9: 451.0                   2015                   Active              

 

 

                                              SPASM OF MUSCLE                   

                  ICD-9: 

728.85                    2015                   Active                

 

                                              Thoracic back pain                

                     ICD-

9: 724.1                   2015                   Active                

 

                                                            Benign positional ve

rtigo                                   

                    ICD-9: 386.11                   2015                  

 Active            

   

 

                                              Suspicious nevus                  

                   ICD-9: 

238.2                     2015                   Active                

 

                                                            EUSTACHIAN TUBE DYSF

UNCTION                                 

                    ICD-9: 381.81                   2014                  

 Active          

     

 

                                              SINUSITIS, ACUTE                  

                   ICD-9: 

461.9                     2014                   Active                

 

                                              DIZZINESS/VERTIGO                 

                    ICD-9:

780.4                     2014                   Active                

 

                                              HYPERTENSION                      

               ICD-9: 

401.9                     2014                   Active                

 

                                              URI, ACUTE                        

             ICD-9: 465.9 

                          10/15/2013                   Active                

 

                                              CEPHALGIA                         

            ICD-9: 784.0  

                          2013                   Active                

 

                                              OTITIS MEDIA NOS                  

                   ICD-9: 

382.9                     2013                   Active                

 

                                              Perforation of ear drum           

                          

ICD-9: 384.20                   05/10/2013                   Active             

  

 

                                              Mastalgia                         

            ICD-9: 611.71 

                          2013                   Active                

 

                                              DYSPEPSIA                         

            ICD-9: 536.8  

                          10/10/2012                   Active                

 

                                              IBS                               

      ICD-9: 564.1        

                          10/10/2012                   Active                

 

                                              FLU VACCINE                       

              ICD-9: 

V04.81                    2012                   Active                

 

                                              Radiculopathy of leg              

                       

ICD-9: 724.4                   2012                   Active              

 

 

                                              SCIATICA                          

           ICD-9: 724.3   

                          2012                   Active                

 

                                                            Lumbar degenerative 

disc disease                            

                    ICD-9: 722.52                   2012                  

 Active     

          

 

                                              Sacroiliac dysfunction            

                         

ICD-9: 739.4                   2012                   Active              

 

 

                                              Hypertension                      

               Unknown    

                          2012                   Active                

 

                                              PAIN, LOWER BACK                  

                   ICD-9: 

724.2                     2011                   Active                

 

                                              SPINAL ENTHESOPATHY               

                      ICD-

9: 720.1                   2011                   Active                

 

                                              Hypotension                       

              ICD-9: 458.9

                          2011                   Active                

 

                                              SACROILIITIS NEC                  

                   ICD-9: 

720.2                     2011                   Active                

 

                                              PHARYNGITIS, ACUTE                

                     ICD-

9: 462                    2010                   Active                

 

                                              URINARY TRACT INFECTION           

                          

ICD-9: 599.0                   2010                   Active              

 

 

                                              Heat exhaustion                   

                  ICD-9: 

992.5                     2010                   Active                

 

                                              Esophagitis                       

              ICD-9: 

530.10                    2010                   Active                

 

                                              Gastritis                         

            ICD-9: 535.50 

                          2010                   Active                

 

                                              GERD                              

       ICD-9: 530.81      

                          2010                   Active                

 

                                              Hiatal hernia                     

                ICD-9: 

553.3                     2010                   Active                

 

                                              B12 DEFIC ANEMIA NEC              

                       

ICD-9: 281.1                   2010                   Active              

 

 

                                              Anxiety                           

          Unknown         

                          2010                   Active                

 

                                              Heart disease                     

                Unknown   

                          2010                   Active                

 

                                              Hyperlipidemia                    

                 Unknown  

                          2010                   Active                

 

                                              ANXIETY STATE NOS                 

                    ICD-9:

300.00                    2010                   Active                

 

                                              DEPRESSIVE DISORDER NEC           

                          

ICD-9: 311                   2010                   Active                



                                                                                
                                                                                
                                                                                
                                                                                
                                                                                
                                                                                
                                                                                
                                                                                
                                                                                
                                                                                
                                                                                
                           



Medications

                      



                    Medication                   Codes                   Instruc

tions               

                    Start Date                   Stop Date                   Sta

tus              

                                        Fill Instructions                

 

                                                            Flonase Allergy Reli

ef 50 mcg/actuation nasal 

spray,suspension                                     RxNorm: 6524492            

                    2 [place] NASAL QHS                   2019              

     No Stop 

Date                      Active                                    

 

                                                            simvastatin 40 mg ta

blet                                    

                    RxNorm: 068557                   1 Tablet(s) PO QD          

         2019                   Active                         

   

      

 

                                                            simvastatin 40 mg ta

blet                                    

                    RxNorm: 194655                   1 Tablet(s) PO QD          

         2019                   Inactive                       

   

        

 

                                                            omeprazole 40 mg cap

alicia,delayed release                    

                          RxNorm: 730004                   1 Capsule(s) PO QD   

          

                    2019                   Ac

tive           

                                                        

 

                                                            simvastatin 40 mg ta

blet                                    

                    RxNorm: 516443                   1 Tablet(s) PO QD          

         2019                   Inactive                       

   

        

 

                                                            omeprazole 40 mg cap

alicia,delayed release                    

                          RxNorm: 806733                   1 Capsule(s) PO QD   

          

                    2019                   In

active         

                                                        

 

                                                            Bactrim  mg-16

0 mg tablet                             

                    RxNorm: 908539                   1 Tablet(s) PO BID         

          

2019                   Inactive              

                                                        

 

                                                            Bactrim  mg-16

0 mg tablet                             

                    RxNorm: 019452                   1 Tablet(s) PO BID         

          

2019                   Inactive              

                                                        

 

                                                            metoprolol tartrate 

25 mg tablet                            

                    RxNorm: 976835                   1 Tablet(s) PO BID         

          

2019                   Inactive              

                                                        

 

                                              Macrobid 100 mg capsule           

                          

RxNorm: 866820                   1 Capsule(s) PO BID                   

2019                   Inactive              

                                                        

 

                                              Xanax 0.25 mg tablet              

                       

RxNorm: 196214                          TAKE 1 TABLET BY MOUTH TWICE DAILY      

       

                    2018                   No Stop Date                   

Active         

                                                        

 

                                              Xanax 0.25 mg tablet              

                       

RxNorm: 282969                   1 Tablet(s) PO BID                   2018                   Inactive                       

  

         

 

                                                            Protonix 40 mg table

t,delayed release                       

                          RxNorm: 479263                   1 Tablet(s) PO BID fo

r 

stomach--replaces omeprazole                   2018                   Inactive                                   

 

                                              Carafate 1 gram tablet            

                         

RxNorm: 516968                   1 Tablet(s) PO AC & HS                   

2018                   Inactive              

                                                        

 

                                              Xanax 0.25 mg tablet              

                       

RxNorm: 275045                   1 Tablet(s) PO BID                   10/30/2018

                    12/10/2018                   Inactive                       

  

         

 

                                                            Bactrim  mg-16

0 mg tablet                             

                    RxNorm: 172311                   1 Tablet(s) PO BID         

          

10/04/2018                   10/08/2018                   Inactive              

                                                        

 

                                                            Bactrim  mg-16

0 mg tablet                             

                    RxNorm: 241486                   1 Tablet(s) PO BID         

          

10/04/2018                   10/03/2018                   Inactive              

                                                        

 

                                              Carafate 1 gram tablet            

                         

RxNorm: 417627                   1 Tablet(s) PO AC & HS                   

2018                   10/17/2018                   Inactive              

                                                        

 

                                                            Protonix 40 mg table

t,delayed release                       

                          RxNorm: 428759                   1 Tablet(s) PO BID fo

r 

stomach--replaces omeprazole                   2018                   Inactive                                   

 

                                                            Protonix 40 mg table

t,delayed release                       

                          RxNorm: 991103                   1 Tablet(s) PO BID fo

r 

stomach--replaces omeprazole                   2018                   Inactive                                   

 

                                                            fluticasone 50 mcg/a

ctuation nasal spray,suspension         

                           RxNorm: 5469763                   2 Spray NASAL QD to

each nostril                   2018          

                          Inactive                                   

 

                                                            Nasonex 50 mcg/actua

tion Spray                              

                    RxNorm: 4303768                   2 Spray NASAL             

      

2018                   Inactive              

                                                        

 

                                                            omeprazole 40 mg cap

alicia,delayed release                    

                          RxNorm: 304269                   1 Capsule(s) PO QD   

          

                    2018                   08/15/2018                   In

active         

                                                        

 

                                                            simvastatin 40 mg ta

blet                                    

                          RxNorm: 865263                   1 Tablet(s) PO QD ROBLES

E 1 TABLET EVERY DAY      

                    2018                   In

active  

                                                        

 

                                                            metoprolol tartrate 

25 mg tablet                            

                    RxNorm: 051842                   1/2 Tablet(s) PO QD        

           

2018                   Inactive              

                                                        

 

                                                            simvastatin 40 mg ta

blet                                    

                          RxNorm: 202734                   Tablet(s) TAKE 1 TABL

ET EVERY DAY              

                    2017                   In

active          

                                                        

 

                                                            metoprolol tartrate 

25 mg tablet                            

                    RxNorm: 055051                   1/2 Tablet(s) PO QD        

           

2017                   Inactive              

                                                        

 

                                                            Flonase 50 mcg/actua

tion nasal spray,suspension             

                          RxNorm: 7394477                   2 Spray NASAL BID   

   

                    2017                   In

active  

                                                        

 

                                                            omeprazole 40 mg cap

alicia,delayed release                    

                          RxNorm: 641887                   1 Capsule(s) PO QD   

          

                    2017                   In

active         

                                                        

 

                                                            metoprolol tartrate 

25 mg tablet                            

                    RxNorm: 484615                   1/2 Tablet(s) PO QD        

           

04/10/2017                   2017                   Inactive              

                                                        

 

                                                            ciprofloxacin 0.2 % 

ear drops in a dropperette              

                          RxNorm: 975110                   4 Drop(s) OTIC TID fo

r 1 

week                      2016               

   

                          Inactive                                   

 

                                              cefdinir 300 mg capsule           

                          

RxNorm: 837878                   2 Capsule(s) PO QD                   2016                   Inactive                       

  

         

 

                                                            omeprazole 40 mg cap

alicia,delayed release                    

                          RxNorm: 750932                   TAKE 1 CAPSULE EVERY 

DAY       

                    2016                   In

active   

                                                        

 

                                                            simvastatin 40 mg ta

blet                                    

                    RxNorm: 728309                   TAKE 1 TABLET EVERY DAY    

               

2016                   Inactive              

                                                        

 

                                                            Flonase 50 mcg/actua

tion nasal spray,suspension             

                          RxNorm: 8888465                   2 Spray NASAL BID   

   

                    2015                   In

active  

                                                        

 

                                                            cefuroxime axetil 25

0 mg tablet                             

                    RxNorm: 261826                   1 Tablet(s) PO BID         

          

2015                   Inactive              

                                                        

 

                                                            cefuroxime axetil 25

0 mg tablet                             

                    RxNorm: 163447                   1 Tablet(s) PO BID         

          

2015                   Inactive              

                                                        

 

                                                            omeprazole 40 mg cap

alicia,delayed release                    

                          RxNorm: 873654                   1 Capsule(s) PO QD   

          

                    2015                   In

active         

                                                        

 

                                              meloxicam 7.5 mg tablet           

                          

RxNorm: 100624                   1 Tablet(s) PO QD                   2015                   Inactive                       

   

        

 

                                              loratadine 10 mg tablet           

                          

RxNorm: 635692                          1 Tablet(s) PO QAM for allergies        

       

                    2014                   In

active           

                                                        

 

                                                            Flonase 50 mcg/actua

tion nasal spray,suspension             

                          RxNorm: 652098                   2 Spray NASAL BID    

   

                    2014                   12/15/2015                   In

active   

                                                        

 

                                              prednisone 20 mg tablet           

                          

RxNorm: 362214                   2 Tablet(s) PO BID                   2014                   Inactive                       

  

         

 

                                                            Dyazide 37.5 mg-25 m

g capsule                               

                    RxNorm: 952854                   1 Capsule(s) PO QAM        

           

2014                   Inactive              

                                                        

 

                                              Ceftin 500 mg tablet              

                       

RxNorm: 932347                   1 Tablet(s) PO BID                   2014                   Inactive                       

  

         

 

                                              Ceftin 500 mg tablet              

                       

RxNorm: 242265                   1 Tablet(s) PO BID                   2014                   Inactive                       

  

         

 

                                                            simvastatin 40 mg ta

blet                                    

                          RxNorm: 810166                   Tablet(s) PO TAKE ONE

 TABLET BY MOUTH EVERY DAY

                    2014                   

Inactive                                                

 

                                                            metoprolol tartrate 

25 mg tablet                            

                          RxNorm: 849497                   Tablet(s) PO TAKE ONE

-HALF TABLET BY 

MOUTH EVERY DAY                   2014       

                          Inactive                                   

 

                                              Ceftin 500 mg tablet              

                       

RxNorm: 342305                   1 Tablet(s) PO BID                   10/15/2013

                    10/24/2013                   Inactive                       

  

         

 

                                              loratadine 10 mg tablet           

                          

RxNorm: 678127                          1 Tablet(s) PO QAM for allergies        

       

                    2013                   In

active           

                                                        

 

                                                            omeprazole 20 mg cap

alicia,delayed release                    

                          RxNorm: 754181                   Capsule(s) PO TAKE ON

E CAPSULE 

BY MOUTH TWICE DAILY                   2013  

                          Inactive                                   

 

                                                            omeprazole 20 mg cap

alicia,delayed release                    

                          RxNorm: 575577                   1 Capsule(s) PO BID  

          

                    2013                   In

active        

                                                        

 

                                                            Augmentin 875 mg-125

 mg tablet                              

                    RxNorm: 189599                   1 Tablet(s) PO Q12H        

           

05/10/2013                   2013                   Inactive              

                                                        

 

                                                            Floxin Otic Drops 1 

bottle Drops                            

                    RxNorm:                    5 Drop(s) OTIC BID               

    

05/10/2013                   2013                   Inactive              

                                                        

 

                                                            metoprolol tartrate 

25 mg tablet                            

                          RxNorm: 963432                   Tablet(s) PO TAKE ONE

-HALF TABLET BY 

MOUTH EVERY DAY                   2013       

                          Inactive                                   

 

                                                            simvastatin 40 mg ta

blet                                    

                          RxNorm: 369934                   Tablet(s) PO TAKE ONE

 TABLET BY MOUTH EVERY DAY

                    2013                   

Inactive                                                

 

                                              lancets                           

          RxNorm:         

                                        Misc Miscellaneous USE ONE TO CHECK GLUC

OSE EVERY DAY                 

                    2013                   In

active             

                                                        

 

                                              Carafate 1 gram tablet            

                         

RxNorm: 195239                   1 Tablet(s) PO AC & HS                   

2012                   Inactive              

                                                        

 

                                              Flagyl 500 mg tablet              

                       

RxNorm: 075611                   1 Tablet(s) PO TID                   10/10/2012

                    10/16/2012                   Inactive                       

  

         

 

                                              Carafate 1 gram tablet            

                         

RxNorm: 771075                   1 Tablet(s) PO AC & HS                   

10/10/2012                   2012                   Inactive              

                                                        

 

                                              One Touch Test strips             

                        

RxNorm:                    Miscellaneous QD                   2012                   Inactive                   one 

touch 

ultra mini test strips                

 

                                              Protonix 40 mg Tab                

                     

RxNorm: 605973                   1 Tablet(s) PO QD                   2012                   Inactive                       

   

        

 

                                              Protonix 40 mg Tab                

                     

RxNorm: 802249                   1 Tablet(s) PO QD                   2012 

                    10/09/2012                   Inactive                       

   

        

 

                                              prednisone 20 mg Tab              

                       

RxNorm: 337433                   1 Tablet(s) PO BID                   2012                   Inactive                       

  

         

 

                                              Flexeril 5 mg Tab                 

                    

RxNorm: 721228                   1 Tablet(s) PO QHS for spasm                   

2012                   Inactive              

                                                        

 

                                              Flexeril 5 mg Tab                 

                    

RxNorm: 146495                   1 Tablet(s) PO QHS for spasm                   

2012                   Inactive              

                                                        

 

                                              amlodipine 2.5 mg Tab             

                        

RxNorm: 075544                          1/2 Tablet(s) PO QD replaces 5mg dose   

       

                    2012                   In

active      

                                                        

 

                                                            simvastatin 40 mg ta

blet                                    

                    RxNorm: 814714                   1 Tablet(s) PO QD          

         2012                   Inactive                       

   

        

 

                                                            metoprolol tartrate 

25 mg tablet                            

                    RxNorm: 774419                   1/2 Tablet(s) PO QD        

           

2012                   Inactive              

                                                        

 

                                                            omeprazole 20 mg cap

alicia,delayed release                    

                          RxNorm: 461895                   1 Capsule(s) PO BID  

          

                    2012                   In

active        

                                                        

 

                                              cefdinir 300 mg Cap               

                      

RxNorm: 203689                   2 Capsule(s) PO QD                   2011                   Inactive                       

  

         

 

                                              simvastatin 40 mg Tab             

                        

RxNorm: 853007                   1 Tablet(s) PO QD                   2011                   Inactive                       

   

        

 

                                                            metoprolol tartrate 

25 mg Tab                               

                    RxNorm: 930586                   1/2 Tablet(s) PO QD        

           

10/25/2011                   2012                   Inactive              

                                                        

 

                                                            metoprolol tartrate 

25 mg Tab                               

                    RxNorm: 637086                   1/2 Tablet(s) PO QD        

           

10/24/2011                   10/24/2011                   Inactive              

                                                        

 

                                              meclizine 25 mg Tab               

                      

RxNorm: 5488693                   1 Tablet(s) PO QHS                   

2011                   Inactive              

                                        for dizziness                

 

                                              Ceftin 500 mg Tab                 

                    

RxNorm: 975449                   1 Tablet(s) PO BID                   2011                   Inactive                       

  

         

 

                                                            omeprazole 20 mg Cap

, Delayed Release                       

                          RxNorm: 661085                   1 Capsule(s) PO BID  

             

                    2011                   10/24/2011                   In

active           

                                                        

 

                                              simvastatin 40 mg Tab             

                        

RxNorm: 296178                   1 Tablet(s) PO QD                   2011                   Inactive                       

   

        

 

                                              simvastatin 40 mg Tab             

                        

RxNorm: 654012                   1 Tablet(s) PO QD                   2011                   Inactive                       

   

        

 

                                              simvastatin 40 mg Tab             

                        

RxNorm: 477524                   1 Tablet(s) PO QD                   2010                   Inactive                       

   

        

 

                                                            Tessalon Perles 100 

mg Cap                                  

                    RxNorm: 258563                   1 Capsule(s) PO Q6-8H      

             

2010                   Inactive              

                                                        

 

                                              cefdinir 300 mg Cap               

                      

RxNorm: 261247                   1 Capsule(s) PO BID                   

2010                   Inactive              

                                                        

 

                                              Lexapro 10 mg Tab                 

                    

RxNorm: 474916                   1 Tablet(s) PO QD                   2010                   Inactive                       

   

        

 

                                              Cipro 250 mg Tab                  

                   RxNorm:

018026                    1 Tablet(s) PO BID                   2010       

 

                    10/04/2010                   Inactive                       

          

 

 

                                                            Wellbutrin  mg

 24 hr Tab                              

                    RxNorm: 875497                   1 Tablet(s) PO QAM         

          

2010                   Inactive              

                                                        

 

                                              Fish Oil 1,000 mg Cap             

                        

RxNorm:                    1 Capsule(s) PO QD                   No Start Date   

                                        Active                                  

 

 

                                                            Tylenol Extra Streng

th 500 mg tablet                        

                          RxNorm: 568115                   1/2 Tablet(s) PO as n

eeded         

                    No Start Date                                       Active  

           

                                                        

 

                                                            nitroglycerin 0.4 mg

 sublingual tablet                      

                          RxNorm: 527137                   Tablet(s) SL as neede

d           

                    No Start Date                                       Active  

             

                                                        

 

                                                            Calcium with Vitamin

 D 600 mg (1,500 mg)-400 unit tablet    

                                RxNorm: 743299                   1 Tablet(s) PO 

QD                   No Start Date                                       Active 

                                                        

 

                                                            Multivitamin & [place]

al Formula Tab                          

                    RxNorm:                    1 Tablet(s) PO QD                

   No 

Start Date                                       Active                         

         

 

                                                            vitamin B complex ca

psule                                   

                    RxNorm:                    1 Capsule(s) PO QD               

    No Start Date  

                                        Active                                  

 

 

                                              Aspirin 81 mg Tab                 

                    

RxNorm: 725791                   1 Tablet(s) PO QD                   No Start 

Date                                       Active                               

   

 

                                                            isosorbide mononitra

te ER 30 mg tablet,extended release 24 

hr                                     RxNorm: 286432                   1 

Tablet(s) PO QHS                   No Start Date                                

                          Active                                    

 

                                                            Vitamin D 1,000 unit

 Cap                                    

                    RxNorm: 139529                   1 Capsule(s) PO QD         

          No Start 

Date                                       Active                               

   

 

                                              Co Q-10 oral                      

               RxNorm: 

06531                   oral                   No Start Date                    

                          Active                                    

 

                                                            metoprolol tartrate 

25 mg Tab                               

                    RxNorm: 366632                   1/2 Tablet(s) PO QD        

           No 

Start Date                   10/23/2011                   Inactive              

                                                        

 

                                                            Nasonex 50 mcg/actua

tion Spray                              

                    RxNorm: 5557971                   2 Spray NASAL             

      No Start

Date                   2018                   Inactive                    

              

 

                                                            Vitamin B12 1000mcg 

Tablet                                  

                    RxNorm:                    1 Tablet(s) PO QD                

   No Start Date  

                    2015                   Inactive                       

    

       

 

                                              amlodipine 5 mg Tab               

                      

RxNorm: 632876                   1 Tablet(s) PO QD                   No Start 

Date                   2012                   Inactive                    

              

 

                                                            simvastatin 40 mg ta

blet                                    

                    RxNorm: 738905                   1 Tablet(s) PO QD          

         No Start 

Date                   2019                   Inactive                    

              

 

                                                            omeprazole 20 mg cap

alicia,delayed release                    

                          RxNorm: 709558                   1 Capsule(s) PO BID  

          

                    No Start Date                   2013                  

 Inactive     

                                                        

 

                                                            omeprazole 40 mg cap

alicia,delayed release                    

                          RxNorm: 683863                   1 Capsule(s) PO QD   

          

                    No Start Date                   2019                  

 Inactive      

                                                        

 

                                              Nexium 40 mg Cap                  

                   RxNorm:

236729                    1 Capsule(s) PO QD                   No Start Date    

 

                    2010                   Inactive                       

       

    

 

                                                            Stool Softener 100 m

g Tab                                   

                    RxNorm: 3928309                   2 Tablet(s) PO BID        

           No Start

Date                   2012                   Inactive                    

              

 

                                                            Calcium with Vitamin

 D 600 mg (1,500 mg)-400 unit Tab       

                             RxNorm: 731930                   1 Tablet(s) PO QD 

                    No Start Date                   2015                  

 

Inactive                                                

 

                                                            iron 325 mg (65 mg i

naun) Tab                                

                    RxNorm: 624659                   1 Tablet(s) PO QD          

         No 

Start Date                   2015                   Inactive              

                                                        

 

                                                            Flonase 50 mcg/actua

tion Nasal Spray                        

                          RxNorm: 384496                   2 Spray NASAL BID    

              

                    No Start Date                   2014                  

 Inactive           

                                                        

 

                                                            fluticasone 50 mcg/a

ctuation nasal spray,suspension         

                           RxNorm: 0662464                   2 Spray NASAL QD to

each nostril                   No Start Date                   2018       

                          Inactive                                   

 

                                                            Nasonex 50 mcg/actua

tion Spray                              

                    RxNorm: 1324895                   2 Spray NASAL QD          

         No 

Start Date                   2018                   Inactive              

                                                        

 

                                                            hydralazine 50 mg ta

blet                                    

                          RxNorm: 541427                   1 Tablet(s) PO as nee

ded for BP over 160/90    

                    No Start Date                   2018                  

 

Inactive                                                

 

                                                            Vimovo 500 mg-20 mg 

12 hr Tab                               

                    RxNorm: 919539                   1 Tablet(s) PO BID         

          No 

Start Date                   2011                   Inactive              

                                                        

 

                                                            Tylenol PM 25 mg-500

 mg/15 mL Oral Soln                     

                    RxNorm: 0642447                   1 PO QPM                  

 No 

Start Date                   2015                   Inactive              

                                                        

 

                                                            Iron (Ferrous Sulfat

e) Oral                                 

                    RxNorm:                    Oral                   No Start D

ate              

                    2012                   Inactive                       

            

 

                                              Reglan 10 mg Tab                  

                   RxNorm:

081639                    1 Tablet(s) PO TID before meals                   No 

Start Date                   2011                   Inactive              

                                                        

 

                                              Xanax 1 mg Tab                    

                 RxNorm: 

538946                    1/2 Tablet(s) PO QD                   No Start Date   

 

                    2018                   Inactive                       

      

     

 

                                              amlodipine 10 mg tablet           

                          

RxNorm: 215255                   1 Tablet(s) PO QD                   No Start 

Date                   2014                   Inactive                    

              

 

                                              Iron (dried) Oral                 

                    

RxNorm:                    Oral                      No Start Date              

   

                    2012                   Inactive                       

            

 

                                                            metoprolol tartrate 

50 mg tablet                            

                    RxNorm: 452920                   1 Tablet(s) PO BID         

          No

Start Date                   12/10/2018                   Inactive              

                                                        

 

                                              Xanax 0.25 mg tablet              

                       

RxNorm: 606311                   1 Tablet(s) PO BID                   No Start 

Date                   10/29/2018                   Inactive                    

              

 

                                              lancets                           

          RxNorm:         

                          Miscellaneous check blood sugar at least once daily   

                

No Start Date                   2013                   Inactive           

                                                        

 

                                              simvastatin 40 mg Tab             

                        

RxNorm: 170238                   1 Tablet(s) PO QD                   No Start 

Date                   2010                   Inactive                    

              

 

                                                            isosorbide mononitra

te ER 30 mg tablet,extended release 24 

hr                                     RxNorm: 682062                   1 

Tablet(s) PO QHS                   No Start Date                   2019   

                          Inactive                                   

 

                                                            amlodipine 2.5 mg ta

blet                                    

                    RxNorm: 001912                   1 Tablet(s) PO QD          

         No Start 

Date                   2014                   Inactive                    

              

 

                                                            sucralfate 1 gram ta

blet                                    

                    RxNorm: 725481                   1 Tablet(s) PO QID         

          No Start 

Date                   2019                   Inactive                    

              

 

                                              Fish Oil Oral                     

                RxNorm:   

                    Oral                   No Start Date                   

2012                   Inactive                                   

 

                                              Multiple Vitamin Oral             

                        

RxNorm:                    Oral                      No Start Date              

   

                    2012                   Inactive                       

            

 

                                                            metoprolol tartrate 

25 mg tablet                            

                    RxNorm: 059212                   1/2 Tablet(s) PO QD        

           

No Start Date                   2017                   Inactive           

                                                        

 

                                                            metoprolol tartrate 

50 mg tablet                            

                    RxNorm: 214844                   1/2 Tablet(s) PO BID       

            

No Start Date                   2019                   Inactive           

                                                        

 

                                                            Flonase Allergy Reli

ef 50 mcg/actuation nasal 

spray,suspension                                     RxNorm: 1684299            

                    2 [place] NASAL QHS                   No Start Date           

        

2019                   Inactive                                   



                                                                                
                                                                                
                                                                                
                                                                                
                                                                                
                                                                                
                                                                                
                                                                                
                                                                                
                                                                                
                                                                                
                                                                                
                                                                                
                                                                                
                                                                                
                                                                                
                                                    



Medication Administered

          No Medication Administered data                                       
                            



Immunizations

                      



                Vaccine                   Codes                   Date          

         Status 

              

 

                    Influenza                   CVX: 135                                  

                                        completed                

 

                    Influenza                   CVX: 135                   10/06

/2017               

                                        completed                

 

                    Influenza                   CVX: 135                   10/07

/2016               

                                        completed                

 

                    Influenza                   CVX: 135                   10/16

/2015               

                                        completed                

 

                    Influenza                   CVX: 141                                  

                                        completed                

 

                    Influenza (Adult)                   CVX: 141                

   10/06/2010       

                                        completed                



                                                                                
                                               



Assessments

                      



                    Condition                   Codes                   Effectiv

e Dates             

  

 

                          Acute serous otitis media, left ear                   

ICD-10: H65.02

ICD-9: 381.01                           2019                

 

                          Urinary tract infection, site not specified           

        ICD-10: N39.0

ICD-9: 599.0                            06/10/2019                

 

                          Other fatigue                   ICD-10: R53.83

ICD-9: 780.79                           06/10/2019                

 

                          Hypoglycemia, unspecified                   ICD-10: E1

6.2

ICD-9: 251.2                            06/10/2019                

 

                          Coronary atherosclerosis due to calcified coronary les

ion                   ICD-

10: I25.84

ICD-9: 414.00                           06/10/2019                

 

                          Essential (primary) hypertension                   ICD

-10: I10

ICD-9: 401.9                            06/10/2019                

 

                          Gastro-esophageal reflux disease without esophagitis  

                 ICD-10: 

K21.9

ICD-9: 530.81                           2019                

 

                          Epigastric pain                   ICD-10: R10.13

ICD-9: 789.06                           2018                

 

                          Generalized anxiety disorder                   ICD-10:

 F41.1

ICD-9: 300.00                           2018                

 

                                        Atherosclerotic heart disease of native 

coronary artery without angina pectoris 

                                        ICD-10: I25.10

ICD-9: 414.00                           2018                

 

                          Palpitations                   ICD-10: R00.2

ICD-9: 785.1                            10/02/2018                

 

                          Occlusion and stenosis of bilateral carotid arteries  

                 ICD-10: 

I65.23

ICD-9: 433.10                           2018                

 

                          Dizziness and giddiness                   ICD-10: R42

ICD-9: 780.4                            2018                

 

                          FLU VACCINE                   ICD-10: Z23

ICD-9: V04.81                           2018                

 

                          Cervicalgia                   ICD-10: M54.2

ICD-9: 723.1                            2018                

 

                          Other spondylosis with radiculopathy, cervical region 

                  ICD-10: 

M47.22

ICD-9: 721.0                            2018                

 

                          Cervicocranial syndrome                   ICD-10: M53.

0

ICD-9: 723.2                            2018                

 

                          Vertigo of central origin, bilateral                  

 ICD-10: H81.43

ICD-9: 386.2                            2018                

 

                          Otalgia, bilateral                   ICD-10: H92.03

ICD-9: 388.70                           2018                

 

                          Benign paroxysmal vertigo, bilateral                  

 ICD-10: H81.13

ICD-9: 386.11                           2018                

 

                          Radiculopathy, lumbosacral region                   IC

D-10: M54.17

ICD-9: 724.4                            2018                

 

                          Low back pain                   ICD-10: M54.5

ICD-9: 724.2                            2018                

 

                          Left lower quadrant pain                   ICD-10: R10

.32

ICD-9: 789.04                           2018                

 

                          Impaired fasting glucose                   ICD-10: R73

.01

ICD-9: 790.29                           2017                

 

                          Mixed hyperlipidemia                   ICD-10: E78.2

ICD-9: 272.4                            2017                

 

                          Other intervertebral disc degeneration, lumbar region 

                  ICD-10: 

M51.36

ICD-9: 722.52                           2017                

 

                          Pain in thoracic spine                   ICD-10: M54.6

ICD-9: 724.1                            2017                

 

                          Mastodynia                   ICD-10: N64.4

ICD-9: 611.71                           2017                

 

                          Acute upper respiratory infection, unspecified        

           ICD-10: J06.9

ICD-9: 465.9                            2017                

 

                          Acute mastoiditis without complications, right ear    

               ICD-10: 

H70.001

ICD-9: 383.00                           2016                

 

                          Constipation, unspecified                   ICD-10: K5

9.00

ICD-9: 564.00                           2016                

 

                          Chronic kidney disease, stage 1                   ICD-

10: N18.1

ICD-9: 585.1                            2016                

 

                          Muscle weakness (generalized)                   ICD-10

: M62.81

ICD-9: 780.79                           2015                

 

                          History of falling                   ICD-10: Z91.81

ICD-9: V15.88                           2015                

 

                    POLYURIA                   ICD-9: 788.42                   0

9/15/2015           

    

 

                    Acute renal failure                   ICD-9: 584.9          

         09/15/2015 

              

 

                    HYPERTENSION                   ICD-9: 401.9                 

  09/10/2015        

       

 

                    Hyperglycemia                   ICD-9: 790.29               

    09/10/2015      

         

 

                    CAD                   ICD-9: 414.00                   09/10/

2015                

 

                    HYPERLIPIDEMIA NEC/NOS                   ICD-9: 272.4       

            

09/10/2015                

 

                    MALAISE AND FATIGUE                   ICD-9: 780.79         

          09/10/2015

               

 

                    HYPOGLYCEMIA                   ICD-9: 251.2                 

  2015        

       

 

                    Grieving                   ICD-9: 309.0                               

   

 

                    ABDOMINAL PAIN                   ICD-9: 789.00              

     2015     

          

 

                    CERUMEN IMPACTION                   ICD-9: 380.4            

       2015   

            

 

                    EUSTACHIAN TUBE DYSFUNCTION                   ICD-9: 381.81 

                  

2015                

 

                    SUPERFIC PHLEBITIS-LEG                   ICD-9: 451.0       

            

2015                

 

                    Leg pain                   ICD-9: 729.5                               

   

 

                    Thoracic back pain                   ICD-9: 724.1           

        2015  

             

 

                    SPASM OF MUSCLE                   ICD-9: 728.85             

      2015    

           

 

                    DIZZINESS/VERTIGO                   ICD-9: 780.4            

       2015   

            

 

                    Suspicious nevus                   ICD-9: 238.2             

      2015    

           

 

                    Benign positional vertigo                   ICD-9: 386.11   

                

2015                

 

                    SINUSITIS, ACUTE                   ICD-9: 461.9             

      2014    

           

 

                    B12 DEFIC ANEMIA NEC                   ICD-9: 281.1         

          2014

               

 

                    COUGH                   ICD-9: 786.2                                  



 

                    URI, ACUTE                   ICD-9: 465.9                   

10/15/2013          

     

 

                    ANXIETY STATE NOS                   ICD-9: 300.00           

        2013  

             

 

                    FLU VACCINE                   ICD-9: V04.81                 

  2013        

       

 

                    CEPHALGIA                   ICD-9: 784.0                   0

2013           

    

 

                    OTITIS MEDIA NOS                   ICD-9: 382.9             

      2013    

           

 

                    Perforation of ear drum                   ICD-9: 384.20     

              

05/10/2013                

 

                    Mastalgia                   ICD-9: 611.71                   

2013          

     

 

                    GERD                   ICD-9: 530.81                                  



 

                    DYSPEPSIA                   ICD-9: 536.8                   1

           

    

 

                    IBS                   ICD-9: 564.1                   10/10/2

012                

 

                    URINARY TRACT INFECTION                   ICD-9: 599.0      

             

2012                

 

                    SPINAL ENTHESOPATHY                   ICD-9: 720.1          

         2012 

              

 

                    SACROILIITIS NEC                   ICD-9: 720.2             

      2012    

           

 

                    Radiculopathy of leg                   ICD-9: 724.4         

          2012

               

 

                    LUMB/LUMBOSAC DISC DEGEN                   ICD-9: 722.52    

               

2012                

 

                    SCIATICA                   ICD-9: 724.3                               

   

 

                    PAIN, LOWER BACK                   ICD-9: 724.2             

      2012    

           

 

                    Sacroiliac dysfunction                   ICD-9: 739.4       

            

2012                

 

                    HYPOTENSION                   ICD-9: 458.9                  

 2012         

      

 

                    PHARYNGITIS, ACUTE                   ICD-9: 462             

      2011    

           

 

                    DEPRESSIVE DISORDER NEC                   ICD-9: 311        

           

2011                

 

                    Heat exhaustion                   ICD-9: 992.5              

     2010     

          

 

                    DIAPHRAGMATIC HERNIA                   ICD-9: 553.3         

          2010

               

 

                    Gastritis                   ICD-9: 535.50                   

2010          

     

 

                    Esophagitis                   ICD-9: 530.10                 

  2010        

       



                                                                    



Reason For Visit

                      



                    Reason For Visit                   Effective Dates          

         Notes      

         

 

                    hearing loss                   2019                   

left ear            

   

 

                    follow up                   06/10/2019                      

             

 

                    follow up                   2019                   Jelena inman has upcoming 

appointment with Dr Claire and carotid dopplers tomorrow                

 

                    follow up                   2018                   Jelena inman had heart cath 

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                

 

                    follow up                   2018                   [address]

[place]                

 

                    follow up                   10/02/2018                      

             

 

                    follow up                   2018                      

             

 

                    high blood pressure                   2018            

       Dr Claire has 

ordered holter monitor and hydralazine 50mg PRN                

 

                    dizziness                   2018                   On 

 morning 

patient woke up and went to the bathroom and after lying down became very dizzy.
Patient has hx of vertigo so didn't think anything of it. She usually just has 
them intermittently, but had more that day. While at [employer] began to feel very 
ill and became very shaky. She got home and sat in the recliner and had the 
jittery/nervous feeling. Later that night it finally resolved. Patient feels 
like she had a spell like this around the  and thought it was due to 
extreme heat exhaustion.                

 

                    follow up                   2018                      

             

 

                    back pain                   2018                      

             

 

                    otalgia                   2018                        

           

 

                    back pain                   2018                      

             

 

                    injection(s)                   10/06/2017                   

flu shot            

   

 

                    lab draw                   2017                       

            

 

                    follow up                   2017                      

             

 

                    pelvic pain                   2017                   P

atient states pain 

started in May with a "Constant Ache" to left inguinal area. Patient states at 
that time pain was intermittent. Then, approximately 2nd week  of  pain 
worsened and radiates to left low back area.                

 

                    lab draw                   2017                       

            

 

                    hyperglycemia                   2017                  

                 

 

                    cough                   2017                          

         

 

                    otalgia                   2016                        

           

 

                    injection(s)                   10/07/2016                   

Influenza           

    

 

                    lab draw                   2016                       

            

 

                    constipation                   2016                   

                

 

                    flank pain                   2016                     

              

 

                    follow up                   2015                   3mo

 fwup               



 

                    injection(s)                   10/16/2015                   

Influenza           

    

 

                    acute renal failure                   09/15/2015            

                    

  

 

                    lab draw                   09/10/2015                       

            

 

                    fatigue                   2015                        

           

 

                    otalgia                   2015                        

           

 

                    pain, limb                   2015                     

              

 

                    follow up                   2015                   [facility]

pital fwup          

     

 

                    high blood pressure                   2015            

                    

  

 

                    injection(s)                   10/17/2014                   

flu shot            

   

 

                    sinusitis                   2014                      

             

 

                    high blood pressure                   2014            

                    

  

 

                    cough                   2014                   Was hig

ht at Dr Claire's 

office so he started he on amlodipine 10mg but seems to be dragging her down. 
Patient decreased to 1/2 tablet this last week                

 

                    lab draw                   2014                       

            

 

                    cough                   2014                          

         

 

                    cough                   10/15/2013                          

         

 

                    high blood pressure                   2013            

                    

  

 

                    follow up                   2013                   ER 

               

 

                    dizziness                   2013                      

             

 

                    follow up                   2013                      

             

 

                    otalgia                   05/10/2013                        

           

 

                    breast complaint                   2013               

                    

 

                    lab draw                   2012                       

            

 

                    follow up                   2012                   2mo

 fwup               



 

                    follow up                   10/23/2012                   2wk

 fwup               



 

                    gas and bloating                   10/10/2012               

    Dr Claire has her

monitoring because was high in his office at last visit                

 

                    injection(s)                   2012                   

                

 

                    dizziness                   2012                      

             

 

                    dizziness                   2012                      

             

 

                    lab draw                   2012                       

            

 

                    muscle weakness                   2012                

   concerns of 

simvastatin with fatigue and muscle weakness                

 

                    leg pain/sciatica                   2012              

                    



 

                    hip pain                   2012                       

            

 

                    back pain                   2012                   has

 tried ice pack, 

muscle relaxers, and moist heat                

 

                    back pain                   2012                      

             

 

                    fatigue                   2012                   in ha

nds/feet            

   

 

                    otalgia                   2011                        

           

 

                    follow up                   2011                   2wk

 fwup               



 

                    back pain                   2011                   thi

nks ulcer may have 

returned                

 

                    lab draw                   2011                       

            

 

                    dizziness                   2011                   Lef

t ear and facial 

pain, right ear pain                 

 

                    follow up                   2011                   1wk

 fwup               



 

                    hip pain                   2011                   feel

s very draggy, 

fatigue, BP 80/63, normally running 100's/60's                

 

                    chills                   2010                         

          

 

                    injection(s)                   10/06/2010                   

flu shot            

   

 

                    follow up                   2010                   6wk

 fwup, had HH 

repaired and is starting to feel better, started on reglan 10mg tid             
  

 

                    follow up                   2010                   hos

p fwup              

 

 

                    follow up                   2010                   1mo

 fwup, saw Dr Claire 

and hasn't gave cardiac clearance yet for HH repair, did have chemical stress 
test yesterday and waiting on results                

 

                    follow up                   2010                   fro

m 

EGD/colonoscopy--has Hiatal Hernia and wants to do repair                

 

                    follow up                   2010                      

             



                                                                              



Results

                      



                    Observation                   Observation Code              

     Item           

                    Item Code                   Result                   Date   

             

 

                    GFR CALC                   [id number]                   GFR Non

 Afr Amr            

                                        48 mL/min                   06/10/2019  

              

 

                    GFR CALC                   [id number]                   GFR Afr

 Amr                

                                        59 mL/min                   06/10/2019  

              

 

                    THYROID STIMULATING HORMONE                   83911         

          TSH       

                                        1.936 uIU/mL                   06/10/201

9         

      

 

                    COMPREHENSIVE METABOLIC                   22166             

      AST           

                                        18 U/L                   06/10/2019     

           

 

                    COMPREHENSIVE METABOLIC                   85222             

      ALT           

                                        11 U/L                   06/10/2019     

           

 

                    COMPREHENSIVE METABOLIC                   78710             

      BUN           

                                        18 mg/dL                   06/10/2019   

             

 

                    COMPREHENSIVE METABOLIC                   14032             

      ALBUMIN       

                                        4.2 g/dL                   06/10/2019   

          

  

 

                    COMPREHENSIVE METABOLIC                   21250             

      CHLORIDE      

                                        109 mmol/L                   06/10/2019 

         

     

 

                    COMPREHENSIVE METABOLIC                   97590             

      Bili Total    

                                        0.4 mg/dL                   06/10/2019  

       

      

 

                    COMPREHENSIVE METABOLIC                   30487             

      ALK PHOS      

                                        64 U/L                   06/10/2019     

         

 

 

                    COMPREHENSIVE METABOLIC                   32654             

      SODIUM        

                                        142 mmol/L                   06/10/2019 

           

   

 

                    COMPREHENSIVE METABOLIC                   79908             

      CREATININE    

                                        1.09 mg/dL                   06/10/2019 

       

       

 

                    COMPREHENSIVE METABOLIC                   62026             

      CALCIUM       

                                        9.3 mg/dL                   06/10/2019  

          

   

 

                    COMPREHENSIVE METABOLIC                   58473             

      POTASSIUM     

                                        4.7 mmol/L                   06/10/2019 

        

      

 

                    COMPREHENSIVE METABOLIC                   99572             

      Total Protein 

                                        6.3 g/dL                   06/10/2019   

    

        

 

                    COMPREHENSIVE METABOLIC                   95991             

      Glucose       

                                        84 mg/dL                   06/10/2019   

          

  

 

                    COMPREHENSIVE METABOLIC                   82915             

      Bicarbonate   

                                        25 mmol/L                   06/10/2019  

      

       

 

                    COMPREHENSIVE METABOLIC                   96574             

      AGAP          

                                        8 mmol/L                   06/10/2019   

             

 

                    COMPLETE BLOOD COUNT                   [id number]              

     WBC            

                                        7.8 10e9/L                   06/10/2019 

               

 

                    COMPLETE BLOOD COUNT                   [id number]              

     RBC            

                                        4.04 10e12/L                   06/10/201

9              

 

 

                    COMPLETE BLOOD COUNT                   [id number]              

     HEMOGLOBIN     

                                        12.2 g/dL                   06/10/2019  

        

     

 

                    COMPLETE BLOOD COUNT                   [id number]              

     HEMATOCRIT     

                                        38.6 %                   06/10/2019     

        

  

 

                    COMPLETE BLOOD COUNT                   [id number]              

     MCV            

                                        95.5 fL                   06/10/2019    

            

 

                    COMPLETE BLOOD COUNT                   [id number]              

     MCH            

                                        30.2 pg                   06/10/2019    

            

 

                    COMPLETE BLOOD COUNT                   [id number]              

     MCHC           

                                        31.6 g/dL                   06/10/2019  

              

 

                    COMPLETE BLOOD COUNT                   [id number]              

     PLATELET COUNT 

                                        215 10e9/L                   06/10/2019 

    

          

 

                    COMPLETE BLOOD COUNT                   [id number]              

     Mean Plt Volume

                                        11.2 fL                   06/10/2019    

   

        

 

                    COMPLETE BLOOD COUNT                   [id number]              

     Neut Auto      

                                        45.7 %                   06/10/2019     

         

 

 

                    COMPLETE BLOOD COUNT                   [id number]              

     Lymph Auto     

                                        42.1 %                   06/10/2019     

        

  

 

                    COMPLETE BLOOD COUNT                   [id number]              

     Mono Auto      

                                        9.2 %                   06/10/2019      

         



 

                    COMPLETE BLOOD COUNT                   [id number]              

     RDW            

                                        13.4 %                   06/10/2019     

           

 

                    COMPLETE BLOOD COUNT                   [id number]              

     Eos Auto       

                                        2.7 %                   06/10/2019      

          

 

                    COMPLETE BLOOD COUNT                   [id number]              

     Baso Auto      

                                        0.3 %                   06/10/2019      

         



 

                    COMPLETE BLOOD COUNT                   [id number]              

     Neutrophil Abs 

                                        3.56 10e9/L                   06/10/2019

    

           

 

                    COMPLETE BLOOD COUNT                   [id number]              

     Lymphocyte Abs 

                                        3.28 10e9/L                   06/10/2019

    

           

 

                    COMPLETE BLOOD COUNT                   [id number]              

     Monocyte Abs   

                                        0.72 10e9/L                   06/10/2019

      

         

 

                    COMPLETE BLOOD COUNT                   [id number]              

     Eosinophil Abs 

                                        0.21 10e9/L                   06/10/2019

    

           

 

                    COMPLETE BLOOD COUNT                   [id number]              

     RDW-SD         

                                        44.9 fL                   06/10/2019    

            

 

                    COMPLETE BLOOD COUNT                   [id number]              

     Basophil Abs   

                                        0.02 10e9/L                   06/10/2019

      

         

 

                    COMPLETE BLOOD COUNT                   [id number]              

     WBC            

                                        5.2 10e9/L                   2018 

               

 

                    COMPLETE BLOOD COUNT                   [id number]              

     RBC            

                                        4.21 10e12/L                   

8              

 

 

                    COMPLETE BLOOD COUNT                   [id number]              

     HEMOGLOBIN     

                                        12.8 g/dL                   2018  

        

     

 

                    COMPLETE BLOOD COUNT                   [id number]              

     HEMATOCRIT     

                                        39.0 %                   2018     

        

  

 

                    COMPLETE BLOOD COUNT                   [id number]              

     MCV            

                                        92.6 fL                   2018    

            

 

                    COMPLETE BLOOD COUNT                   [id number]              

     MCH            

                                        30.4 pg                   2018    

            

 

                    COMPLETE BLOOD COUNT                   [id number]              

     MCHC           

                                        32.8 g/dL                   2018  

              

 

                    COMPLETE BLOOD COUNT                   [id number]              

     PLATELET COUNT 

                                        202 10e9/L                   2018 

    

          

 

                    COMPLETE BLOOD COUNT                   [id number]              

     Mean Plt Volume

                                        10.7 fL                   2018    

   

        

 

                    COMPLETE BLOOD COUNT                   [id number]              

     Neut Auto      

                                        40.9 %                   2018     

         

 

 

                    COMPLETE BLOOD COUNT                   [id number]              

     Lymph Auto     

                                        46.3 %                   2018     

        

  

 

                    COMPLETE BLOOD COUNT                   [id number]              

     Mono Auto      

                                        9.3 %                   2018      

         



 

                    COMPLETE BLOOD COUNT                   [id number]              

     RDW            

                                        13.7 %                   2018     

           

 

                    COMPLETE BLOOD COUNT                   [id number]              

     Eos Auto       

                                        2.9 %                   2018      

          

 

                    COMPLETE BLOOD COUNT                   [id number]              

     Baso Auto      

                                        0.6 %                   2018      

         



 

                    COMPLETE BLOOD COUNT                   [id number]              

     Neutrophil Abs 

                                        2.13 10e9/L                   2018

    

           

 

                    COMPLETE BLOOD COUNT                   [id number]              

     Lymphocyte Abs 

                                        2.41 10e9/L                   2018

    

           

 

                    COMPLETE BLOOD COUNT                   [id number]              

     Monocyte Abs   

                                        0.48 10e9/L                   2018

      

         

 

                    COMPLETE BLOOD COUNT                   [id number]              

     Eosinophil Abs 

                                        0.15 10e9/L                   2018

    

           

 

                    COMPLETE BLOOD COUNT                   [id number]              

     RDW-SD         

                                        45.2 fL                   2018    

            

 

                    COMPLETE BLOOD COUNT                   [id number]              

     Basophil Abs   

                                        0.03 10e9/L                   2018

      

         

 

                    METABOLIC PANEL TOTAL CA                   84665            

       Glucose      

                                        118 mg/dL                   2018  

         

    

 

                    METABOLIC PANEL TOTAL CA                   75256            

       CREATININE   

                                        1.01 mg/dL                   2018 

      

        

 

                    METABOLIC PANEL TOTAL CA                   66310            

       BUN          

                                        14 mg/dL                   2018   

             

 

                    METABOLIC PANEL TOTAL CA                   06252            

       SODIUM       

                                        141 mmol/L                   2018 

          

    

 

                    METABOLIC PANEL TOTAL CA                   86529            

       POTASSIUM    

                                        4.0 mmol/L                   2018 

       

       

 

                    METABOLIC PANEL TOTAL CA                   21877            

       CHLORIDE     

                                        108 mmol/L                   2018 

        

      

 

                    METABOLIC PANEL TOTAL CA                   84297            

       Bicarbonate  

                                        25 mmol/L                   2018  

     

        

 

                    METABOLIC PANEL TOTAL CA                   79954            

       AGAP         

                                        8 mmol/L                   2018   

            



 

                    METABOLIC PANEL TOTAL CA                   17510            

       CALCIUM      

                                        9.6 mg/dL                   2018  

         

    

 

                FREE T4                   19506                   T4 Free       

                

                          1.23 ng/dL                   2018               

 

 

                    GFR CALC                   [id number]                   GFR Non

 Afr Amr            

                                        53 mL/min                   2018  

              

 

                    GFR CALC                   [id number]                   GFR Afr

 Amr                

                                        >60 mL/min                   2018 

               

 

                    THYROID STIMULATING HORMONE                   14479         

          TSH       

                                        2.124 uIU/mL                   

8         

      

 

                    LIPID GROUP                   [id number]                   Choles

terol               

                                        152 mg/dL                   2018  

              

 

                    LIP[employer] GROUP                   [id number]                   Trigly

ceride              

                                        151 mg/dL                   2018  

              

 

                    LIP[employer]                   [id number]                   HDL CH

OLESTEROL           

                                        47 mg/dL                   2018   

             

 

                    LIP[employer]                   [id number]                   Chol/H

DL Ratio            

                                        3.23 ratio                   2018 

               

 

                    [employer]                   [id number]                   NON-HD

L Chol              

                                        105 mg/dL                   2018  

              

 

                    LIP[employer] GROUP                   [id number]                   LDL Ch

olesterol           

                                        75 mg/dL                   2018   

             

 

                    ASSAY OF TROPONIN QUANT                   60920             

      Troponin-I    

                                        <0.30 ng/mL                   2018

       

        

 

                    COMPREHENSIVE METABOLIC                   27689             

      AST           

                                        20 U/L                   2018     

           

 

                    COMPREHENSIVE METABOLIC                   62869             

      ALT           

                                        14 U/L                   2018     

           

 

                    COMPREHENSIVE METABOLIC                   15199             

      BUN           

                                        19 mg/dL                   2018   

             

 

                    COMPREHENSIVE METABOLIC                   54804             

      ALBUMIN       

                                        4.2 g/dL                   2018   

          

  

 

                    COMPREHENSIVE METABOLIC                   59622             

      CHLORIDE      

                                        102 mmol/L                   2018 

         

     

 

                    COMPREHENSIVE METABOLIC                   39392             

      Bili Total    

                                        0.4 mg/dL                   2018  

       

      

 

                    COMPREHENSIVE METABOLIC                   02034             

      ALK PHOS      

                                        66 U/L                   2018     

         

 

 

                    COMPREHENSIVE METABOLIC                   98535             

      SODIUM        

                                        135 mmol/L                   2018 

           

   

 

                    COMPREHENSIVE METABOLIC                   59179             

      CREATININE    

                                        1.01 mg/dL                   2018 

       

       

 

                    COMPREHENSIVE METABOLIC                   71976             

      CALCIUM       

                                        9.3 mg/dL                   2018  

          

   

 

                    COMPREHENSIVE METABOLIC                   53441             

      POTASSIUM     

                                        4.8 mmol/L                   2018 

        

      

 

                    COMPREHENSIVE METABOLIC                   85314             

      Total Protein 

                                        7.0 g/dL                   2018   

    

        

 

                    COMPREHENSIVE METABOLIC                   86568             

      Glucose       

                                        91 mg/dL                   2018   

          

  

 

                    COMPREHENSIVE METABOLIC                   72417             

      Bicarbonate   

                                        23 mmol/L                   2018  

      

       

 

                    COMPREHENSIVE METABOLIC                   34643             

      AGAP          

                                        10 mmol/L                   2018  

             



 

                    COMPLETE BLOOD COUNT                   [id number]              

     WBC            

                                        7.5 10e9/L                   2018 

               

 

                    COMPLETE BLOOD COUNT                   [id number]              

     RBC            

                                        4.13 10e12/L                   

8              

 

 

                    COMPLETE BLOOD COUNT                   [id number]              

     HEMOGLOBIN     

                                        12.6 g/dL                   2018  

        

     

 

                    COMPLETE BLOOD COUNT                   [id number]              

     HEMATOCRIT     

                                        38.4 %                   2018     

        

  

 

                    COMPLETE BLOOD COUNT                   [id number]              

     MCV            

                                        93.0 fL                   2018    

            

 

                    COMPLETE BLOOD COUNT                   [id number]              

     MCH            

                                        30.5 pg                   2018    

            

 

                    COMPLETE BLOOD COUNT                   [id number]              

     MCHC           

                                        32.8 g/dL                   2018  

              

 

                    COMPLETE BLOOD COUNT                   [id number]              

     PLATELET COUNT 

                                        204 10e9/L                   2018 

    

          

 

                    COMPLETE BLOOD COUNT                   [id number]              

     Mean Plt Volume

                                        10.9 fL                   2018    

   

        

 

                    COMPLETE BLOOD COUNT                   [id number]              

     Neut Auto      

                                        43.1 %                   2018     

         

 

 

                    COMPLETE BLOOD COUNT                   [id number]              

     Lymph Auto     

                                        45.0 %                   2018     

        

  

 

                    COMPLETE BLOOD COUNT                   [id number]              

     Mono Auto      

                                        9.2 %                   2018      

         



 

                    COMPLETE BLOOD COUNT                   [id number]              

     RDW            

                                        13.4 %                   2018     

           

 

                    COMPLETE BLOOD COUNT                   [id number]              

     Eos Auto       

                                        2.3 %                   2018      

          

 

                    COMPLETE BLOOD COUNT                   [id number]              

     Baso Auto      

                                        0.4 %                   2018      

         



 

                    COMPLETE BLOOD COUNT                   [id number]              

     Neutrophil Abs 

                                        3.23 10e9/L                   2018

    

           

 

                    COMPLETE BLOOD COUNT                   [id number]              

     Lymphocyte Abs 

                                        3.38 10e9/L                   2018

    

           

 

                    COMPLETE BLOOD COUNT                   [id number]              

     Monocyte Abs   

                                        0.69 10e9/L                   2018

      

         

 

                    COMPLETE BLOOD COUNT                   [id number]              

     Eosinophil Abs 

                                        0.17 10e9/L                   2018

    

           

 

                    COMPLETE BLOOD COUNT                   [id number]              

     RDW-SD         

                                        44.4 fL                   2018    

            

 

                    COMPLETE BLOOD COUNT                   [id number]              

     Basophil Abs   

                                        0.03 10e9/L                   2018

      

         

 

                    GFR CALC                   0723203                   GFR Non

 Afr Amr            

                                        53 mL/min                   2018  

              

 

                    GFR CALC                   5688092                   GFR Afr

 Amr                

                                        >60 mL/min                   2018 

               

 

                    GLYCOSYLATED HEMOGLOBIN TEST                   19831        

           Hgb A1c  

                    95847-6                   5.4 %                   2018

    

           

 

                    MEAN GLUC                                      Calc M

elisha Gluc            

                                        108 mg/dL                   2018  

              

 

                    MEAN GLUC                                      Calc M

elisha Gluc            

                                        114 mg/dL                   2017  

              

 

                    LIPID GROUP                   [id number]                   Choles

terol               

                                        146 mg/dL                   2017  

              

 

                    LIP[employer] GROUP                   [id number]                   Trigly

ceride              

                                        119 mg/dL                   2017  

              

 

                    LIP[employer] GROUP                   [id number]                   HDL CH

OLESTEROL           

                                        47 mg/dL                   2017   

             

 

                    LIP[employer] GROUP                   [id number]                   Chol/H

DL Ratio            

                                        3.11 ratio                   2017 

               

 

                    LIP[employer] GROUP                   [id number]                   NON-HD

L Chol              

                                        99 mg/dL                   2017   

             

 

                    LIPID GROUP                   [id number]                   LDL Ch

olesterol           

                                        75 mg/dL                   2017   

             

 

                    GLYCOSYLATED HEMOGLOBIN TEST                   46643        

           Hgb A1c  

                    37572-4                   5.6 %                   2017

    

           

 

                    COMPREHENSIVE METABOLIC                   02661             

      AST           

                                        22 U/L                   2017     

           

 

                    COMPREHENSIVE METABOLIC                   90283             

      ALT           

                                        12 U/L                   2017     

           

 

                    COMPREHENSIVE METABOLIC                   42034             

      BUN           

                                        17 mg/dL                   2017   

             

 

                    COMPREHENSIVE METABOLIC                   27518             

      ALBUMIN       

                                        4.0 g/dL                   2017   

          

  

 

                    COMPREHENSIVE METABOLIC                   24019             

      CHLORIDE      

                                        110 mmol/L                   2017 

         

     

 

                    COMPREHENSIVE METABOLIC                   08379             

      Bili Total    

                                        0.4 mg/dL                   2017  

       

      

 

                    COMPREHENSIVE METABOLIC                   28766             

      ALK PHOS      

                                        63 U/L                   2017     

         

 

 

                    COMPREHENSIVE METABOLIC                   09641             

      SODIUM        

                                        140 mmol/L                   2017 

           

   

 

                    COMPREHENSIVE METABOLIC                   59156             

      CREATININE    

                                        1.05 mg/dL                   2017 

       

       

 

                    COMPREHENSIVE METABOLIC                   84990             

      CALCIUM       

                                        9.2 mg/dL                   2017  

          

   

 

                    COMPREHENSIVE METABOLIC                   32252             

      POTASSIUM     

                                        4.2 mmol/L                   2017 

        

      

 

                    COMPREHENSIVE METABOLIC                   96338             

      Total Protein 

                                        6.2 g/dL                   2017   

    

        

 

                    COMPREHENSIVE METABOLIC                   56176             

      Glucose       

                                        87 mg/dL                   2017   

          

  

 

                    COMPREHENSIVE METABOLIC                   66804             

      Bicarbonate   

                                        24 mmol/L                   2017  

      

       

 

                    COMPREHENSIVE METABOLIC                   18695             

      AGAP          

                                        6 mmol/L                   2017   

             

 

                    GFR CALC                   8417198                   GFR Non

 Afr Amr            

                                        51 mL/min                   2017  

              

 

                    GFR CALC                   6466055                   GFR Afr

 Amr                

                                        >60 mL/min                   2017 

               

 

                    COMPLETE BLOOD COUNT                   [id number]              

     WBC            

                                        6.7 10e9/L                   2017 

               

 

                    COMPLETE BLOOD COUNT                   [id number]              

     RBC            

                                        4.04 10e12/L                   

7              

 

 

                    COMPLETE BLOOD COUNT                   [id number]              

     HEMOGLOBIN     

                                        12.1 g/dL                   2017  

        

     

 

                    COMPLETE BLOOD COUNT                   [id number]              

     HEMATOCRIT     

                                        38.0 %                   2017     

        

  

 

                    COMPLETE BLOOD COUNT                   [id number]              

     MCV            

                                        94.1 fL                   2017    

            

 

                    COMPLETE BLOOD COUNT                   [id number]              

     MCH            

                                        30.0 pg                   2017    

            

 

                    COMPLETE BLOOD COUNT                   [id number]              

     MCHC           

                                        31.8 g/dL                   2017  

              

 

                    COMPLETE BLOOD COUNT                   3946554              

     PLATELET COUNT 

                                        206 10e9/L                   2017 

    

          

 

                    COMPLETE BLOOD COUNT                   [id number]              

     Mean Plt Volume

                                        11.3 fL                   2017    

   

        

 

                    COMPLETE BLOOD COUNT                   [id number]              

     Neut Auto      

                                        35.8 %                   2017     

         

 

 

                    COMPLETE BLOOD COUNT                   [id number]              

     Lymph Auto     

                                        51.6 %                   2017     

        

  

 

                    COMPLETE BLOOD COUNT                   [id number]              

     Mono Auto      

                                        8.8 %                   2017      

         



 

                    COMPLETE BLOOD COUNT                   [id number]              

     RDW            

                                        13.5 %                   2017     

           

 

                    COMPLETE BLOOD COUNT                   [id number]              

     Eos Auto       

                                        3.4 %                   2017      

          

 

                    COMPLETE BLOOD COUNT                   [id number]              

     Baso Auto      

                                        0.4 %                   2017      

         



 

                    COMPLETE BLOOD COUNT                   [id number]              

     Neutrophil Abs 

                                        2.40 10e9/L                   2017

    

           

 

                    COMPLETE BLOOD COUNT                   [id number]              

     Lymphocyte Abs 

                                        3.46 10e9/L                   2017

    

           

 

                    COMPLETE BLOOD COUNT                   [id number]              

     Monocyte Abs   

                                        0.59 10e9/L                   2017

      

         

 

                    COMPLETE BLOOD COUNT                   [id number]              

     Eosinophil Abs 

                                        0.23 10e9/L                   2017

    

           

 

                    COMPLETE BLOOD COUNT                   [id number]              

     RDW-SD         

                                        45.3 fL                   2017    

            

 

                    COMPLETE BLOOD COUNT                   [id number]              

     Basophil Abs   

                                        0.03 10e9/L                   2017

      

         

 

                    THYROID STIMULATING HORMONE                   12966         

          TSH       

                                        1.981 uIU/mL                   

7         

      

 

                    COMPLETE BLOOD COUNT                   [id number]              

     WBC            

                                        6.0 10e9/L                   2017 

               

 

                    COMPLETE BLOOD COUNT                   [id number]              

     RBC            

                                        4.29 10e12/L                   

7              

 

 

                    COMPLETE BLOOD COUNT                   [id number]              

     HEMOGLOBIN     

                                        12.9 g/dL                   2017  

        

     

 

                    COMPLETE BLOOD COUNT                   [id number]              

     HEMATOCRIT     

                                        38.4 %                   2017     

        

  

 

                    COMPLETE BLOOD COUNT                   [id number]              

     MCV            

                                        89.5 fL                   2017    

            

 

                    COMPLETE BLOOD COUNT                   [id number]              

     MCH            

                                        30.1 pg                   2017    

            

 

                    COMPLETE BLOOD COUNT                   [id number]              

     MCHC           

                                        33.6 g/dL                   2017  

              

 

                    COMPLETE BLOOD COUNT                   [id number]              

     PLATELET COUNT 

                                        181 10e9/L                   2017 

    

          

 

                    COMPLETE BLOOD COUNT                   [id number]              

     Mean Plt Volume

                                        11.7 fL                   2017    

   

        

 

                    COMPLETE BLOOD COUNT                   [id number]              

     Neut Auto      

                                        36.9 %                   2017     

         

 

 

                    COMPLETE BLOOD COUNT                   [id number]              

     Lymph Auto     

                                        50.4 %                   2017     

        

  

 

                    COMPLETE BLOOD COUNT                   [id number]              

     Mono Auto      

                                        9.0 %                   2017      

         



 

                    COMPLETE BLOOD COUNT                   [id number]              

     RDW            

                                        13.7 %                   2017     

           

 

                    COMPLETE BLOOD COUNT                   [id number]              

     Eos Auto       

                                        3.4 %                   2017      

          

 

                    COMPLETE BLOOD COUNT                   [id number]              

     Baso Auto      

                                        0.3 %                   2017      

         



 

                    COMPLETE BLOOD COUNT                   [id number]              

     Neutrophil Abs 

                                        2.21 10e9/L                   2017

    

           

 

                    COMPLETE BLOOD COUNT                   [id number]              

     Lymphocyte Abs 

                                        3.02 10e9/L                   2017

    

           

 

                    COMPLETE BLOOD COUNT                   [id number]              

     Monocyte Abs   

                                        0.54 10e9/L                   2017

      

         

 

                    COMPLETE BLOOD COUNT                   [id number]              

     Eosinophil Abs 

                                        0.20 10e9/L                   2017

    

           

 

                    COMPLETE BLOOD COUNT                   [id number]              

     RDW-SD         

                                        44.0 fL                   2017    

            

 

                    COMPLETE BLOOD COUNT                   [id number]              

     Basophil Abs   

                                        0.02 10e9/L                   2017

      

         

 

                    GLYCOSYLATED HEMOGLOBIN TEST                   55190        

           Hgb A1c  

                    32627-5                   5.4 %                   2017

    

           

 

                    THYROID STIMULATING HORMONE                   13813         

          TSH       

                                        2.200 uIU/mL                   

7         

      

 

                    GFR CALC                   [id number]                   GFR Non

 Afr Amr            

                                        50 mL/min                   2017  

              

 

                    GFR CALC                   3746109                   GFR Afr

 Amr                

                                        >60 mL/min                   2017 

               

 

                    MEAN GLUC                   [id number]                   Calc M

elisha Gluc            

                                        108 mg/dL                   2017  

              

 

                    COMPREHENSIVE METABOLIC                   72625             

      AST           

                                        18 U/L                   2017     

           

 

                    COMPREHENSIVE METABOLIC                   95435             

      ALT           

                                        10 U/L                   2017     

           

 

                    COMPREHENSIVE METABOLIC                   61400             

      BUN           

                                        20 mg/dL                   2017   

             

 

                    COMPREHENSIVE METABOLIC                   38870             

      ALBUMIN       

                                        4.1 g/dL                   2017   

          

  

 

                    COMPREHENSIVE METABOLIC                   02596             

      CHLORIDE      

                                        109 mmol/L                   2017 

         

     

 

                    COMPREHENSIVE METABOLIC                   84535             

      Bili Total    

                                        0.6 mg/dL                   2017  

       

      

 

                    COMPREHENSIVE METABOLIC                   06574             

      ALK PHOS      

                                        64 U/L                   2017     

         

 

 

                    COMPREHENSIVE METABOLIC                   03614             

      SODIUM        

                                        141 mmol/L                   2017 

           

   

 

                    COMPREHENSIVE METABOLIC                   62859             

      CREATININE    

                                        1.06 mg/dL                   2017 

       

       

 

                    COMPREHENSIVE METABOLIC                   81711             

      CALCIUM       

                                        9.9 mg/dL                   2017  

          

   

 

                    COMPREHENSIVE METABOLIC                   77772             

      POTASSIUM     

                                        4.2 mmol/L                   2017 

        

      

 

                    COMPREHENSIVE METABOLIC                   21287             

      Total Protein 

                                        6.3 g/dL                   2017   

    

        

 

                    COMPREHENSIVE METABOLIC                   89684             

      Glucose       

                                        99 mg/dL                   2017   

          

  

 

                    COMPREHENSIVE METABOLIC                   36703             

      Bicarbonate   

                                        21 mmol/L                   2017  

      

       

 

                    COMPREHENSIVE METABOLIC                   91428             

      AGAP          

                                        11 mmol/L                   2017  

             



 

                    LIPID GROUP                   [id number]                   Choles

terol               

                                        169 mg/dL                   2016  

              

 

                    LIPID GROUP                   [id number]                   Trigly

ceride              

                                        165 mg/dL                   2016  

              

 

                    LIPID GROUP                   [id number]                   HDL CH

OLESTEROL           

                                        43 mg/dL                   2016   

             

 

                    LIPID GROUP                   [id number]                   Chol/H

DL Ratio            

                                        3.93 ratio                   2016 

               

 

                    LIPID GROUP                   [id number]                   NON-HD

L Chol              

                                        126 mg/dL                   2016  

              

 

                    LIPID GROUP                   [id number]                   LDL Ch

olesterol           

                                        93 mg/dL                   2016   

             

 

                    COMPREHENSIVE METABOLIC                   99233             

      AST           

                                        18 U/L                   2016     

           

 

                    COMPREHENSIVE METABOLIC                   04918             

      ALT           

                                        10 U/L                   2016     

           

 

                    COMPREHENSIVE METABOLIC                   29365             

      BUN           

                                        20 mg/dL                   2016   

             

 

                    COMPREHENSIVE METABOLIC                   02197             

      ALBUMIN       

                                        3.9 g/dL                   2016   

          

  

 

                    COMPREHENSIVE METABOLIC                   95309             

      CHLORIDE      

                                        110 mmol/L                   2016 

         

     

 

                    COMPREHENSIVE METABOLIC                   47905             

      Bili Total    

                                        0.5 mg/dL                   2016  

       

      

 

                    COMPREHENSIVE METABOLIC                   72616             

      ALK PHOS      

                                        72 U/L                   2016     

         

 

 

                    COMPREHENSIVE METABOLIC                   08851             

      SODIUM        

                                        141 mmol/L                   2016 

           

   

 

                    COMPREHENSIVE METABOLIC                   46193             

      CREATININE    

                                        1.12 mg/dL                   2016 

       

       

 

                    COMPREHENSIVE METABOLIC                   90177             

      CALCIUM       

                                        9.7 mg/dL                   2016  

          

   

 

                    COMPREHENSIVE METABOLIC                   32211             

      POTASSIUM     

                                        4.4 mmol/L                   2016 

        

      

 

                    COMPREHENSIVE METABOLIC                   35996             

      Total Protein 

                                        6.2 g/dL                   2016   

    

        

 

                    COMPREHENSIVE METABOLIC                   08942             

      Glucose       

                                        90 mg/dL                   2016   

          

  

 

                    COMPREHENSIVE METABOLIC                   39374             

      Bicarbonate   

                                        23 mmol/L                   2016  

      

       

 

                    COMPREHENSIVE METABOLIC                   19142             

      AGAP          

                                        8 mmol/L                   2016   

             

 

                    GFR CALC                   0660784                   GFR Non

 Afr Amr            

                                        47 mL/min                   2016  

              

 

                    GFR CALC                   4184736                   GFR Afr

 Amr                

                                        57 mL/min                   2016  

              

 

                    GLYCOSYLATED HEMOGLOBIN TEST                   00461        

           Hgb A1c  

                    13569-0                   5.5 %                   2016

    

           

 

                    THYROID STIMULATING HORMONE                   41685         

          TSH       

                                        2.537 uIU/mL                   

6         

      

 

                FREE T4                   70415                   T4 Free       

                

                          1.36 ng/dL                   2016               

 

 

                    COMPLETE BLOOD COUNT                   [id number]              

     WBC            

                                        6.8 10e9/L                   2016 

               

 

                    COMPLETE BLOOD COUNT                   6346308              

     RBC            

                                        4.20 10e12/L                   

6              

 

 

                    COMPLETE BLOOD COUNT                   [id number]              

     HEMOGLOBIN     

                                        12.5 g/dL                   2016  

        

     

 

                    COMPLETE BLOOD COUNT                   8494022              

     HEMATOCRIT     

                                        38.0 %                   2016     

        

  

 

                    COMPLETE BLOOD COUNT                   5588990              

     MCV            

                                        90.5 fL                   2016    

            

 

                    COMPLETE BLOOD COUNT                   5558814              

     MCH            

                                        29.8 pg                   2016    

            

 

                    COMPLETE BLOOD COUNT                   2102577              

     MCHC           

                                        32.9 g/dL                   2016  

              

 

                    COMPLETE BLOOD COUNT                   [id number]              

     PLATELET COUNT 

                                        197 10e9/L                   2016 

    

          

 

                    COMPLETE BLOOD COUNT                   0589508              

     Mean Plt Volume

                                        11.7 fL                   2016    

   

        

 

                    COMPLETE BLOOD COUNT                   1903204              

     Neut Auto      

                                        41.3 %                   2016     

         

 

 

                    COMPLETE BLOOD COUNT                   8140015              

     Lymph Auto     

                                        47.1 %                   2016     

        

  

 

                    COMPLETE BLOOD COUNT                   5745528              

     Mono Auto      

                                        7.8 %                   2016      

         



 

                    COMPLETE BLOOD COUNT                   8487740              

     RDW            

                                        13.8 %                   2016     

           

 

                    COMPLETE BLOOD COUNT                   1765248              

     Eos Auto       

                                        3.4 %                   2016      

          

 

                    COMPLETE BLOOD COUNT                   9990456              

     Baso Auto      

                                        0.4 %                   2016      

         



 

                    COMPLETE BLOOD COUNT                   9597006              

     Neutrophil Abs 

                                        2.81 10e9/L                   2016

    

           

 

                    COMPLETE BLOOD COUNT                   [id number]              

     Lymphocyte Abs 

                                        3.20 10e9/L                   2016

    

           

 

                    COMPLETE BLOOD COUNT                   [id number]              

     Monocyte Abs   

                                        0.53 10e9/L                   2016

      

         

 

                    COMPLETE BLOOD COUNT                   [id number]              

     Eosinophil Abs 

                                        0.23 10e9/L                   2016

    

           

 

                    COMPLETE BLOOD COUNT                   6907528              

     RDW-SD         

                                        44.4 fL                   2016    

            

 

                    COMPLETE BLOOD COUNT                   [id number]              

     Basophil Abs   

                                        0.03 10e9/L                   2016

      

         

 

                    MEAN GLUC                   [id number]                   Calc M

elisha Gluc            

                                        111 mg/dL                   2016  

              

 

                    METABOLIC PANEL TOTAL CA                   73536            

       Glucose      

                                        89 MG/DL                   2015   

         

   

 

                    METABOLIC PANEL TOTAL CA                   75375            

       CREATININE   

                                        1.12 MG/DL                   2015 

      

        

 

                    METABOLIC PANEL TOTAL CA                   90886            

       BUN          

                                        20 MG/DL                   2015   

             

 

                    METABOLIC PANEL TOTAL CA                   84590            

       SODIUM       

                                        139 MMOL/L                   2015 

          

    

 

                    METABOLIC PANEL TOTAL CA                   51318            

       POTASSIUM    

                                        4.6 MMOL/L                   2015 

       

       

 

                    METABOLIC PANEL TOTAL CA                   39741            

       CHLORIDE     

                                        108 MMOL/L                   2015 

        

      

 

                    METABOLIC PANEL TOTAL CA                   38464            

       BICARB       

                                        26 MMOL/L                   2015  

          

   

 

                    METABOLIC PANEL TOTAL CA                   65113            

       ANION GAP    

                                        5 MEQ/L                   2015    

       

    

 

                    METABOLIC PANEL TOTAL CA                   01067            

       CALCIUM      

                                        10.0 MG/DL                   2015 

         

     

 

                GFR CALC                   9404917                   GFR AA     

                

                          57.0L ML/MIN                   2015             

   

 

                    GFR CALC                   0677979                   GFR NON

-AA                 

                                        47.0L ML/MIN                   

5                

 

                    THYROID STIMULATING HORMONE                   38884         

          TSH       

                                        2.378 uIU/ML                   09/10/201

5         

      

 

                    COMPLETE BLOOD COUNT                   4496171              

     WBC            

                                        6.4 10e9/L                   09/10/2015 

               

 

                    COMPLETE BLOOD COUNT                   2733797              

     RBC            

                                        3.99 10e12/L                   09/10/201

5              

 

 

                    COMPLETE BLOOD COUNT                   [id number]              

     HGB            

                                        11.9 g/dL                   09/10/2015  

              

 

                    COMPLETE BLOOD COUNT                   4767259              

     HCT DET        

                                        36.9 %                   09/10/2015     

           

 

                    COMPLETE BLOOD COUNT                   1021055              

     MCV            

                                        92.5 fL                   09/10/2015    

            

 

                    COMPLETE BLOOD COUNT                   [id number]              

     MCH            

                                        29.8 pg                   09/10/2015    

            

 

                    COMPLETE BLOOD COUNT                   5176742              

     MCHC           

                                        32.2 g/dL                   09/10/2015  

              

 

                    COMPLETE BLOOD COUNT                   [id number]              

     PLT            

                                        172 10e9/L                   09/10/2015 

               

 

                    COMPLETE BLOOD COUNT                   [id number]              

     MPV            

                                        11.7 fL                   09/10/2015    

            

 

                    COMPLETE BLOOD COUNT                   7803271              

     ARIK %          

                                        40.4 %                   09/10/2015     

           

 

                    COMPLETE BLOOD COUNT                   [id number]              

     LY %           

                                        48.0 %                   09/10/2015     

           

 

                    COMPLETE BLOOD COUNT                   [id number]              

     MON %          

                                        8.3 %                   09/10/2015      

          

 

                    COMPLETE BLOOD COUNT                   9972125              

     EOS  %         

                                        2.8 %                   09/10/2015      

          

 

                    COMPLETE BLOOD COUNT                   5074825              

     BASO %         

                                        0.5 %                   09/10/2015      

          

 

                    COMPLETE BLOOD COUNT                   [id number]              

     RDW            

                                        13.6 %                   09/10/2015     

           

 

                    COMPLETE BLOOD COUNT                   [id number]              

     ABS ARIK        

                                        2.59 10e9/L                   09/10/2015

           

    

 

                    COMPLETE BLOOD COUNT                   [id number]              

     ABS LYMPH      

                                        3.07 10e9/L                   09/10/2015

         

      

 

                    COMPLETE BLOOD COUNT                   [id number]              

     ABS MONO       

                                        0.53 10e9/L                   09/10/2015

          

     

 

                    COMPLETE BLOOD COUNT                   [id number]              

     ABS EOS        

                                        0.18 10e9/L                   09/10/2015

           

    

 

                    COMPLETE BLOOD COUNT                   [id number]              

     ABS BASO       

                                        0.03 10e9/L                   09/10/2015

          

     

 

                    COMPLETE BLOOD COUNT                   [id number]              

     RDW-SD         

                                        44.9 fL                   09/10/2015    

            

 

                    LIPID GROUP                   [id number]                   HDL TE

ST                  

                                        42 MG/DL                   09/10/2015   

             

 

                LIPID GROUP                   [id number]                   TRIG      

                

                          177 MG/DL                   09/10/2015                

 

                    LIPID GROUP                   [id number]                   TEST L

DL                  

                                        72 MG/DL                   09/10/2015   

             

 

                LIPID GROUP                   [id number]                   CHOL      

                

                          149 MG/DL                   09/10/2015                

 

                    LIPID GROUP                   [id number]                   RCHOL/

HDL                 

                                        3.55 RATIO                   09/10/2015 

               

 

                    LIPID GROUP                   [id number]                   NON-HD

L CH                

                                        107 MG/DL                   09/10/2015  

              

 

                    GLYCOSYLATED HEMOGLOBIN TEST                   65872        

           A1C HPLC 

                    34284-0                   5.5 %                   09/10/2015

   

            

 

                FREE T4                   65387                   FREE T4       

                

                          1.39 NG/DL                   09/10/2015               

 

 

                GFR CALC                   3443161                   GFR AA     

                

                          55.0L ML/MIN                   09/10/2015             

   

 

                    GFR CALC                   3540109                   GFR NON

-AA                 

                                        46.0L ML/MIN                   09/10/201

5                

 

                    COMPREHENSIVE METABOLIC                   64939             

      AST           

                                        17 U/L                   09/10/2015     

           

 

                    COMPREHENSIVE METABOLIC                   90352             

      ALT           

                                        10 IU/L                   09/10/2015    

            

 

                    COMPREHENSIVE METABOLIC                   76702             

      BUN           

                                        20 MG/DL                   09/10/2015   

             

 

                    COMPREHENSIVE METABOLIC                   00089             

      ALBUMIN       

                                        3.9 GM/DL                   09/10/2015  

          

   

 

                    COMPREHENSIVE METABOLIC                   33010             

      CHLORIDE      

                                        111 MMOL/L                   09/10/2015 

         

     

 

                    COMPREHENSIVE METABOLIC                   68978             

      BILI TOT      

                                        0.4 MG/DL                   09/10/2015  

         

    

 

                    COMPREHENSIVE METABOLIC                   51774             

      ALK PHOS      

                                        70 U/L                   09/10/2015     

         

 

 

                    COMPREHENSIVE METABOLIC                   91027             

      SODIUM        

                                        142 MMOL/L                   09/10/2015 

           

   

 

                    COMPREHENSIVE METABOLIC                   76564             

      CREATININE    

                                        1.16 MG/DL                   09/10/2015 

       

       

 

                    COMPREHENSIVE METABOLIC                   03065             

      CALCIUM       

                                        9.4 MG/DL                   09/10/2015  

          

   

 

                    COMPREHENSIVE METABOLIC                   32578             

      POTASSIUM     

                                        4.6 MMOL/L                   09/10/2015 

        

      

 

                    COMPREHENSIVE METABOLIC                   28507             

      PROT TOT      

                                        6.2 GM/DL                   09/10/2015  

         

    

 

                    COMPREHENSIVE METABOLIC                   30773             

      Glucose       

                                        90 MG/DL                   09/10/2015   

          

  

 

                    COMPREHENSIVE METABOLIC                   39436             

      BICARB        

                                        24 MMOL/L                   09/10/2015  

           

  

 

                    COMPREHENSIVE METABOLIC                   01001             

      ANION GAP     

                                        7 MEQ/L                   09/10/2015    

        

   

 

                    THYROID STIMULATING HORMONE                   09427         

          TSH       

                                        2.427 uIU/ML                   

5         

      

 

                    LIPID GROUP                   [id number]                   HDL TE

ST                  

                                        47 MG/DL                   2015   

             

 

                LIPID GROUP                   [id number]                   TRIG      

                

                          145 MG/DL                   2015                

 

                    LIPID GROUP                   [id number]                   TEST L

DL                  

                                        73 MG/DL                   2015   

             

 

                LIPID GROUP                   [id number]                   CHOL      

                

                          149 MG/DL                   2015                

 

                    LIPID GROUP                   [id number]                   RCHOL/

HDL                 

                                        3.17 RATIO                   2015 

               

 

                    LIPID GROUP                   [id number]                   NON-HD

L CH                

                                        102 MG/DL                   2015  

              

 

                    COMPREHENSIVE METABOLIC                   59946             

      AST           

                                        17 U/L                   2015     

           

 

                    COMPREHENSIVE METABOLIC                   29477             

      ALT           

                                        9 IU/L                   2015     

           

 

                    COMPREHENSIVE METABOLIC                   42136             

      BUN           

                                        19 MG/DL                   2015   

             

 

                    COMPREHENSIVE METABOLIC                   51923             

      ALBUMIN       

                                        4.3 GM/DL                   2015  

          

   

 

                    COMPREHENSIVE METABOLIC                   81993             

      CHLORIDE      

                                        108 MMOL/L                   2015 

         

     

 

                    COMPREHENSIVE METABOLIC                   58855             

      BILI TOT      

                                        0.5 MG/DL                   2015  

         

    

 

                    COMPREHENSIVE METABOLIC                   86691             

      ALK PHOS      

                                        68 U/L                   2015     

         

 

 

                    COMPREHENSIVE METABOLIC                   46575             

      SODIUM        

                                        140 MMOL/L                   2015 

           

   

 

                    COMPREHENSIVE METABOLIC                   58613             

      CREATININE    

                                        1.08 MG/DL                   2015 

       

       

 

                    COMPREHENSIVE METABOLIC                   69460             

      CALCIUM       

                                        9.9 MG/DL                   2015  

          

   

 

                    COMPREHENSIVE METABOLIC                   32782             

      POTASSIUM     

                                        4.3 MMOL/L                   2015 

        

      

 

                    COMPREHENSIVE METABOLIC                   87159             

      PROT TOT      

                                        7.2 GM/DL                   2015  

         

    

 

                    COMPREHENSIVE METABOLIC                   96861             

      Glucose       

                                        94 MG/DL                   2015   

          

  

 

                    COMPREHENSIVE METABOLIC                   00062             

      BICARB        

                                        26 MMOL/L                   2015  

           

  

 

                    COMPREHENSIVE METABOLIC                   81519             

      ANION GAP     

                                        6 MEQ/L                   2015    

        

   

 

                GFR CALC                   7190805                   GFR AA     

                

                          60.0L ML/MIN                   2015             

   

 

                    GFR CALC                   8821956                   GFR NON

-AA                 

                                        49.0L ML/MIN                   

5                

 

                    GLYCOSYLATED HEMOGLOBIN TEST                   44686        

           A1C HPLC 

                    04699-2                   5.6 %                   2015

   

            

 

                    COMPLETE BLOOD COUNT                   [id number]              

     WBC            

                                        7.2 10e9/L                   2015 

               

 

                    COMPLETE BLOOD COUNT                   [id number]              

     RBC            

                                        4.28 10e12/L                   

5              

 

 

                    COMPLETE BLOOD COUNT                   7829114              

     HGB            

                                        12.8 g/dL                   2015  

              

 

                    COMPLETE BLOOD COUNT                   [id number]              

     HCT DET        

                                        39.3 %                   2015     

           

 

                    COMPLETE BLOOD COUNT                   5008618              

     MCV            

                                        91.8 fL                   2015    

            

 

                    COMPLETE BLOOD COUNT                   [id number]              

     MCH            

                                        29.9 pg                   2015    

            

 

                    COMPLETE BLOOD COUNT                   [id number]              

     MCHC           

                                        32.6 g/dL                   2015  

              

 

                    COMPLETE BLOOD COUNT                   [id number]              

     PLT            

                                        189 10e9/L                   2015 

               

 

                    COMPLETE BLOOD COUNT                   9899314              

     MPV            

                                        11.2 fL                   2015    

            

 

                    COMPLETE BLOOD COUNT                   4856153              

     ARIK %          

                                        38.0 %                   2015     

           

 

                    COMPLETE BLOOD COUNT                   [id number]              

     LY %           

                                        51.0 %                   2015     

           

 

                    COMPLETE BLOOD COUNT                   9945233              

     MON %          

                                        7.7 %                   2015      

          

 

                    COMPLETE BLOOD COUNT                   9055426              

     EOS  %         

                                        2.9 %                   2015      

          

 

                    COMPLETE BLOOD COUNT                   5093149              

     BASO %         

                                        0.4 %                   2015      

          

 

                    COMPLETE BLOOD COUNT                   [id number]              

     RDW            

                                        14.0 %                   2015     

           

 

                    COMPLETE BLOOD COUNT                   8275963              

     ABS ARIK        

                                        2.74 10e9/L                   2015

           

    

 

                    COMPLETE BLOOD COUNT                   2728225              

     ABS LYMPH      

                                        3.67 10e9/L                   2015

         

      

 

                    COMPLETE BLOOD COUNT                   3068998              

     ABS MONO       

                                        0.55 10e9/L                   2015

          

     

 

                    COMPLETE BLOOD COUNT                   [id number]              

     ABS EOS        

                                        0.21 10e9/L                   2015

           

    

 

                    COMPLETE BLOOD COUNT                   3602551              

     ABS BASO       

                                        0.03 10e9/L                   2015

          

     

 

                    COMPLETE BLOOD COUNT                   [id number]              

     RDW-SD         

                                        46.1 fL                   2015    

            

 

                FREE T4                   03998                   FREE T4       

                

                          1.14 NG/DL                   2015               

 

 

                    GLYCOSYLATED HEMOGLOBIN TEST                   70712        

           A1C HPLC 

                    53103-3                   5.2 %                   2014

   

            

 

                FREE T4                   50914                   FREE T4       

                

                          1.40 NG/DL                   2014               

 

 

                GFR CALC                   3274670                   GFR AA     

                

                          >60 ML/MIN                   2014               

 

 

                    GFR CALC                   5911077                   GFR NON

-AA                 

                                        52.0L ML/MIN                   

4                

 

                    COMPREHENSIVE METABOLIC                   79989             

      AST           

                                        15 U/L                   2014     

           

 

                    COMPREHENSIVE METABOLIC                   53296             

      ALT           

                                        9 IU/L                   2014     

           

 

                    COMPREHENSIVE METABOLIC                   38539             

      BUN           

                                        17 MG/DL                   2014   

             

 

                    COMPREHENSIVE METABOLIC                   48501             

      ALBUMIN       

                                        4.0 GM/DL                   2014  

          

   

 

                    COMPREHENSIVE METABOLIC                   90791             

      CHLORIDE      

                                        112 MMOL/L                   2014 

         

     

 

                    COMPREHENSIVE METABOLIC                   99042             

      BILI TOT      

                                        0.5 MG/DL                   2014  

         

    

 

                    COMPREHENSIVE METABOLIC                   01686             

      ALK PHOS      

                                        66 U/L                   2014     

         

 

 

                    COMPREHENSIVE METABOLIC                   27797             

      SODIUM        

                                        140 MMOL/L                   2014 

           

   

 

                    COMPREHENSIVE METABOLIC                   51028             

      CREATININE    

                                        1.03 MG/DL                   2014 

       

       

 

                    COMPREHENSIVE METABOLIC                   52850             

      CALCIUM       

                                        9.5 MG/DL                   2014  

          

   

 

                    COMPREHENSIVE METABOLIC                   33933             

      POTASSIUM     

                                        4.1 MMOL/L                   2014 

        

      

 

                    COMPREHENSIVE METABOLIC                   42374             

      PROT TOT      

                                        6.2 GM/DL                   2014  

         

    

 

                    COMPREHENSIVE METABOLIC                   15682             

      Glucose       

                                        102 MG/DL                   2014  

          

   

 

                    COMPREHENSIVE METABOLIC                   29299             

      BICARB        

                                        23 MMOL/L                   2014  

           

  

 

                    COMPREHENSIVE METABOLIC                   31581             

      ANION GAP     

                                        5 MEQ/L                   2014    

        

   

 

                    THYROID STIMULATING HORMONE                   29961         

          TSH       

                                        2.074 uIU/ML                   

4         

      

 

                    VITAMIN B 12 FOLIC ACID                   [id number]       

            VIT B 12

                                        423 PG/ML                   2014  

   

          

 

                    VITAMIN B 12 FOLIC ACID                   [id number]       

            FOLIC 

ACID                                       19.7 NG/ML                   

2014                

 

                    LIPID GROUP                   [id number]                   HDL TE

ST                  

                                        40 MG/DL                   2014   

             

 

                LIPID GROUP                   [id number]                   TRIG      

                

                          145 MG/DL                   2014                

 

                    LIPID GROUP                   [id number]                   TEST L

DL                  

                                        81 MG/DL                   2014   

             

 

                LIPID GROUP                   [id number]                   CHOL      

                

                          150 MG/DL                   2014                

 

                    LIPID GROUP                   [id number]                   RCHOL/

HDL                 

                                        3.75 RATIO                   2014 

               

 

                    COMPLETE BLOOD COUNT                   [id number]              

     WBC            

                                        6.0 10e9/L                   2014 

               

 

                    COMPLETE BLOOD COUNT                   [id number]              

     RBC            

                                        4.26 10e12/L                   

4              

 

 

                    COMPLETE BLOOD COUNT                   1955444              

     HGB            

                                        12.7 g/dL                   2014  

              

 

                    COMPLETE BLOOD COUNT                   [id number]              

     HCT DET        

                                        38.7 %                   2014     

           

 

                    COMPLETE BLOOD COUNT                   [id number]              

     MCV            

                                        90.8 fL                   2014    

            

 

                    COMPLETE BLOOD COUNT                   [id number]              

     MCH            

                                        29.8 pg                   2014    

            

 

                    COMPLETE BLOOD COUNT                   [id number]              

     MCHC           

                                        32.8 g/dL                   2014  

              

 

                    COMPLETE BLOOD COUNT                   6678601              

     PLT            

                                        178 10e9/L                   2014 

               

 

                    COMPLETE BLOOD COUNT                   [id number]              

     MPV            

                                        11.7 fL                   2014    

            

 

                    COMPLETE BLOOD COUNT                   [id number]              

     ARIK %          

                                        30.5 %                   2014     

           

 

                    COMPLETE BLOOD COUNT                   [id number]              

     LY %           

                                        55.4 %                   2014     

           

 

                    COMPLETE BLOOD COUNT                   [id number]              

     MON %          

                                        9.0 %                   2014      

          

 

                    COMPLETE BLOOD COUNT                   8657853              

     EOS  %         

                                        4.4 %                   2014      

          

 

                    COMPLETE BLOOD COUNT                   2460173              

     BASO %         

                                        0.7 %                   2014      

          

 

                    COMPLETE BLOOD COUNT                   1158231              

     RDW            

                                        13.3 %                   2014     

           

 

                    COMPLETE BLOOD COUNT                   [id number]              

     ABS ARIK        

                                        1.83 10e9/L                   2014

           

    

 

                    COMPLETE BLOOD COUNT                   [id number]              

     ABS LYMPH      

                                        3.32 10e9/L                   2014

         

      

 

                    COMPLETE BLOOD COUNT                   [id number]              

     ABS MONO       

                                        0.54 10e9/L                   2014

          

     

 

                    COMPLETE BLOOD COUNT                   [id number]              

     ABS EOS        

                                        0.26 10e9/L                   2014

           

    

 

                    COMPLETE BLOOD COUNT                   [id number]              

     ABS BASO       

                                        0.04 10e9/L                   2014

          

     

 

                    COMPLETE BLOOD COUNT                   [id number]              

     RDW-SD         

                                        43.2 fL                   2014    

            

 

                    HEMOGLOBIN A1C (GLYCOSYLATED)                   [id number]     

              A1C 

\[facility]\""                   65904-7                   5.5 %                   

2012                

 

                    COMPLETE BLOOD COUNT                   [id number]              

     WBC            

                                        6.0 10e9/L                   2012 

               

 

                    COMPLETE BLOOD COUNT                   [id number]              

     RBC            

                                        4.22 10e12/L                   

2              

 

 

                    COMPLETE BLOOD COUNT                   [id number]              

     HGB            

                                        12.4 g/dL                   2012  

              

 

                    COMPLETE BLOOD COUNT                   [id number]              

     HCT DET        

                                        38.2 %                   2012     

           

 

                    COMPLETE BLOOD COUNT                   [id number]              

     MCV            

                                        90.5 fL                   2012    

            

 

                    COMPLETE BLOOD COUNT                   [id number]              

     MCH            

                                        29.4 pg                   2012    

            

 

                    COMPLETE BLOOD COUNT                   [id number]              

     MCHC           

                                        32.5 g/dL                   2012  

              

 

                    COMPLETE BLOOD COUNT                   [id number]              

     PLT            

                                        187 10e9/L                   2012 

               

 

                    COMPLETE BLOOD COUNT                   [id number]              

     MPV            

                                        11.5 fL                   2012    

            

 

                    COMPLETE BLOOD COUNT                   [id number]              

     ARIK %          

                                        36.4 %                   2012     

           

 

                    COMPLETE BLOOD COUNT                   [id number]              

     LY %           

                                        51.0 %                   2012     

           

 

                    COMPLETE BLOOD COUNT                   [id number]              

     MON %          

                                        8.7 %                   2012      

          

 

                    COMPLETE BLOOD COUNT                   [id number]              

     EOS  %         

                                        3.2 %                   2012      

          

 

                    COMPLETE BLOOD COUNT                   [id number]              

     BASO %         

                                        0.7 %                   2012      

          

 

                    COMPLETE BLOOD COUNT                   [id number]              

     RDW            

                                        13.7 %                   2012     

           

 

                    COMPLETE BLOOD COUNT                   [id number]              

     ABS ARIK        

                                        2.18 10e9/L                   2012

           

    

 

                    COMPLETE BLOOD COUNT                   [id number]              

     ABS LYMPH      

                                        3.06 10e9/L                   2012

         

      

 

                    COMPLETE BLOOD COUNT                   [id number]              

     ABS MONO       

                                        0.52 10e9/L                   2012

          

     

 

                    COMPLETE BLOOD COUNT                   [id number]              

     ABS EOS        

                                        0.19 10e9/L                   2012

           

    

 

                    COMPLETE BLOOD COUNT                   [id number]              

     ABS BASO       

                                        0.04 10e9/L                   2012

          

     

 

                    COMPLETE BLOOD COUNT                   [id number]              

     RDW-SD         

                                        44.3 fL                   2012    

            

 

                    LIPID GROUP                   [id number]                   HDL TE

ST                  

                                        42 MG/DL                   2012   

             

 

                LIPID GROUP                   [id number]                   TRIG      

                

                          156 MG/DL                   2012                

 

                    LIPID GROUP                   [id number]                   TEST L

DL                  

                                        80 MG/DL                   2012   

             

 

                LIPID GROUP                   [id number]                   CHOL      

                

                          153 MG/DL                   2012                

 

                    LIPID GROUP                   [id number]                   RCHOL/

HDL                 

                                        3.64 RATIO                   2012 

               

 

                FREE T4                   46582                   FREE T4       

                

                          1.22 NG/DL                   2012               

 

 

                    COMPREHENSIVE METABOLIC                   04204             

      AST           

                                        20 U/L                   2012     

           

 

                    COMPREHENSIVE METABOLIC                   71378             

      ALT           

                                        11 IU/L                   2012    

            

 

                    COMPREHENSIVE METABOLIC                   95694             

      BUN           

                                        19 MG/DL                   2012   

             

 

                    COMPREHENSIVE METABOLIC                   26829             

      ALBUMIN       

                                        4.3 GM/DL                   2012  

          

   

 

                    COMPREHENSIVE METABOLIC                   40115             

      CHLORIDE      

                                        109 MMOL/L                   2012 

         

     

 

                    COMPREHENSIVE METABOLIC                   15981             

      BILI TOT      

                                        0.6 MG/DL                   2012  

         

    

 

                    COMPREHENSIVE METABOLIC                   85793             

      ALK PHOS      

                                        84 U/L                   2012     

         

 

 

                    COMPREHENSIVE METABOLIC                   80510             

      SODIUM        

                                        142 MMOL/L                   2012 

           

   

 

                    COMPREHENSIVE METABOLIC                   26117             

      CREATININE    

                                        1.09 MG/DL                   2012 

       

       

 

                    COMPREHENSIVE METABOLIC                   32723             

      CALCIUM       

                                        9.8 MG/DL                   2012  

          

   

 

                    COMPREHENSIVE METABOLIC                   44608             

      POTASSIUM     

                                        4.2 MMOL/L                   2012 

        

      

 

                    COMPREHENSIVE METABOLIC                   74275             

      PROT TOT      

                                        6.4 GM/DL                   2012  

         

    

 

                    COMPREHENSIVE METABOLIC                   77767             

      Glucose       

                                        89 MG/DL                   2012   

          

  

 

                    COMPREHENSIVE METABOLIC                   96135             

      BICARB        

                                        25 MMOL/L                   2012  

           

  

 

                    COMPREHENSIVE METABOLIC                   84383             

      ANION GAP     

                                        8 MEQ/L                   2012    

        

   

 

                GFR CALC                   [id number]                   GFR AA     

                

                          60.0L ML/MIN                   2012             

   

 

                    GFR CALC                   [id number]                   GFR NON

-AA                 

                                        49.0L ML/MIN                   

2                

 

                    THYROID STIMULATING HORMONE                   12493         

          TSH       

                                        2.450 uIU/ML                   

2         

      

 

                    COMPREHENSIVE METABOLIC                   47873             

      AST           

                                        22 U/L                   2012     

           

 

                    COMPREHENSIVE METABOLIC                   35865             

      ALT           

                                        14 IU/L                   2012    

            

 

                    COMPREHENSIVE METABOLIC                   38249             

      BUN           

                                        21 MG/DL                   2012   

             

 

                    COMPREHENSIVE METABOLIC                   84855             

      ALBUMIN       

                                        4.3 GM/DL                   2012  

          

   

 

                    COMPREHENSIVE METABOLIC                   49602             

      CHLORIDE      

                                        106 MMOL/L                   2012 

         

     

 

                    COMPREHENSIVE METABOLIC                   44125             

      BILI TOT      

                                        0.4 MG/DL                   2012  

         

    

 

                    COMPREHENSIVE METABOLIC                   82791             

      ALK PHOS      

                                        80 U/L                   2012     

         

 

 

                    COMPREHENSIVE METABOLIC                   07823             

      SODIUM        

                                        141 MMOL/L                   2012 

           

   

 

                    COMPREHENSIVE METABOLIC                   51519             

      CREATININE    

                                        1.13 MG/DL                   2012 

       

       

 

                    COMPREHENSIVE METABOLIC                   37187             

      CALCIUM       

                                        9.4 MG/DL                   2012  

          

   

 

                    COMPREHENSIVE METABOLIC                   30648             

      POTASSIUM     

                                        4.3 MMOL/L                   2012 

        

      

 

                    COMPREHENSIVE METABOLIC                   69874             

      PROT TOT      

                                        6.7 GM/DL                   2012  

         

    

 

                    COMPREHENSIVE METABOLIC                   92872             

      Glucose       

                                        98 MG/DL                   2012   

          

  

 

                    COMPREHENSIVE METABOLIC                   80451             

      BICARB        

                                        25 MMOL/L                   2012  

           

  

 

                    COMPREHENSIVE METABOLIC                   34168             

      ANION GAP     

                                        10 MEQ/L                   2012   

        

    

 

                    LIPID GROUP                   [id number]                   HDL TE

ST                  

                                        44 MG/DL                   2012   

             

 

                LIPID GROUP                   [id number]                   TRIG      

                

                          164 MG/DL                   2012                

 

                    LIPID GROUP                   [id number]                   TEST L

DL                  

                                        98 MG/DL                   2012   

             

 

                LIPID GROUP                   [id number]                   CHOL      

                

                          175 MG/DL                   2012                

 

                    LIPID GROUP                   [id number]                   RCHOL/

HDL                 

                                        3.98 RATIO                   2012 

               

 

                    COMPLETE BLOOD COUNT                   08125                

   WBC              

                                        6.7 10e9/L                   2012 

               

 

                    COMPLETE BLOOD COUNT                   91509                

   RBC              

                                        4.36 10e12/L                   

2                

 

                    COMPLETE BLOOD COUNT                   42249                

   HGB              

                                        12.9 g/dL                   2012  

              

 

                    COMPLETE BLOOD COUNT                   60360                

   HCT DET          

                                        39.4 %                   2012     

           

 

                    COMPLETE BLOOD COUNT                   56527                

   MCV              

                                        90.4 fL                   2012    

            

 

                    COMPLETE BLOOD COUNT                   69880                

   MCH              

                                        29.6 pg                   2012    

            

 

                    COMPLETE BLOOD COUNT                   33043                

   MCHC             

                                        32.7 g/dL                   2012  

              

 

                    COMPLETE BLOOD COUNT                   91620                

   PLT              

                                        184 10e9/L                   2012 

               

 

                    COMPLETE BLOOD COUNT                   50277                

   MPV              

                                        10.9 fL                   2012    

            

 

                    COMPLETE BLOOD COUNT                   74111                

   ARIK %            

                                        41.5 %                   2012     

           

 

                    COMPLETE BLOOD COUNT                   58262                

   LY %             

                                        45.7 %                   2012     

           

 

                    COMPLETE BLOOD COUNT                   15127                

   MON %            

                                        9.4 %                   2012      

          

 

                    COMPLETE BLOOD COUNT                   51842                

   EOS  %           

                                        3.0 %                   2012      

          

 

                    COMPLETE BLOOD COUNT                   80490                

   BASO %           

                                        0.4 %                   2012      

          

 

                    COMPLETE BLOOD COUNT                   31771                

   RDW              

                                        13.2 %                   2012     

           

 

                    COMPLETE BLOOD COUNT                   53687                

   ABS ARIK          

                                        2.78 10e9/L                   2012

             

  

 

                    COMPLETE BLOOD COUNT                   94240                

   ABS LYMPH        

                                        3.06 10e9/L                   2012

           

    

 

                    COMPLETE BLOOD COUNT                   99053                

   ABS MONO         

                                        0.63 10e9/L                   2012

            

   

 

                    COMPLETE BLOOD COUNT                   56180                

   ABS EOS          

                                        0.20 10e9/L                   2012

             

  

 

                    COMPLETE BLOOD COUNT                   40128                

   ABS BASO         

                                        0.03 10e9/L                   2012

            

   

 

                    COMPLETE BLOOD COUNT                   72199                

   RDW-SD           

                                        42.3 fL                   2012    

            

 

                GFR CALC                   1296893                   GFR AA     

                

                          57.0L ML/MIN                   2012             

   

 

                    GFR CALC                   [id number]                   GFR NON

-AA                 

                                        47.0L ML/MIN                   

2                

 

                    THYROID STIMULATING HORMONE                   64970         

          TSH       

                                        2.663 uIU/ML                   

2         

      

 

                FREE T4                   68662                   FREE T4       

                

                          1.15 NG/DL                   2012               

 

 

                    THYROID STIMULATING HORMONE                   61031         

          TSH       

                                        1.908 uIU/ML                   

1         

      

 

                    COMPLETE BLOOD COUNT                   10896                

   WBC              

                                        6.4 10e9/L                   2011 

               

 

                    COMPLETE BLOOD COUNT                   04542                

   RBC              

                                        3.92 10e12/L                   

1                

 

                    COMPLETE BLOOD COUNT                   16310                

   HGB              

                                        11.8 g/dL                   2011  

              

 

                    COMPLETE BLOOD COUNT                   14694                

   HCT DET          

                                        36.0 %                   2011     

           

 

                    COMPLETE BLOOD COUNT                   51763                

   MCV              

                                        91.8 fL                   2011    

            

 

                    COMPLETE BLOOD COUNT                   39572                

   MCH              

                                        30.1 pg                   2011    

            

 

                    COMPLETE BLOOD COUNT                   52864                

   MCHC             

                                        32.8 g/dL                   2011  

              

 

                    COMPLETE BLOOD COUNT                   48079                

   PLT              

                                        176 10e9/L                   2011 

               

 

                    COMPLETE BLOOD COUNT                   11170                

   MPV              

                                        11.4 fL                   2011    

            

 

                    COMPLETE BLOOD COUNT                   52301                

   ARIK %            

                                        50.4 %                   2011     

           

 

                    COMPLETE BLOOD COUNT                   24969                

   LY %             

                                        35.5 %                   2011     

           

 

                    COMPLETE BLOOD COUNT                   83100                

   MON %            

                                        10.2 %                   2011     

           

 

                    COMPLETE BLOOD COUNT                   69082                

   EOS  %           

                                        3.3 %                   2011      

          

 

                    COMPLETE BLOOD COUNT                   60165                

   BASO %           

                                        0.6 %                   2011      

          

 

                    COMPLETE BLOOD COUNT                   24497                

   RDW              

                                        13.7 %                   2011     

           

 

                    COMPLETE BLOOD COUNT                   43735                

   ABS ARIK          

                                        3.23 10e9/L                   2011

             

  

 

                    COMPLETE BLOOD COUNT                   77243                

   ABS LYMPH        

                                        2.27 10e9/L                   2011

           

    

 

                    COMPLETE BLOOD COUNT                   66564                

   ABS MONO         

                                        0.65 10e9/L                   2011

            

   

 

                    COMPLETE BLOOD COUNT                   97377                

   ABS EOS          

                                        0.21 10e9/L                   2011

             

  

 

                    COMPLETE BLOOD COUNT                   57139                

   ABS BASO         

                                        0.04 10e9/L                   2011

            

   

 

                    COMPLETE BLOOD COUNT                   07384                

   RDW-SD           

                                        45.3 fL                   2011    

            

 

                GFR CALC                   [id number]                   GFR AA     

                

                          >60 ML/MIN                   2011               

 

 

                    GFR CALC                   [id number]                   GFR NON

-AA                 

                                        53.0L ML/MIN                   

1                

 

                FREE T4                   49119                   FREE T4       

                

                          1.20 NG/DL                   2011               

 

 

                    COMPREHENSIVE METABOLIC                   91882             

      AST           

                                        17 U/L                   2011     

           

 

                    COMPREHENSIVE METABOLIC                   98628             

      ALT           

                                        9 IU/L                   2011     

           

 

                    COMPREHENSIVE METABOLIC                   49490             

      BUN           

                                        16 MG/DL                   2011   

             

 

                    COMPREHENSIVE METABOLIC                   82017             

      ALBUMIN       

                                        4.0 GM/DL                   2011  

          

   

 

                    COMPREHENSIVE METABOLIC                   52782             

      CHLORIDE      

                                        108 MMOL/L                   2011 

         

     

 

                    COMPREHENSIVE METABOLIC                   13436             

      BILI TOT      

                                        0.5 MG/DL                   2011  

         

    

 

                    COMPREHENSIVE METABOLIC                   17388             

      ALK PHOS      

                                        76 U/L                   2011     

         

 

 

                    COMPREHENSIVE METABOLIC                   44527             

      SODIUM        

                                        139 MMOL/L                   2011 

           

   

 

                    COMPREHENSIVE METABOLIC                   53020             

      CREATININE    

                                        1.02 MG/DL                   2011 

       

       

 

                    COMPREHENSIVE METABOLIC                   25483             

      CALCIUM       

                                        9.2 MG/DL                   2011  

          

   

 

                    COMPREHENSIVE METABOLIC                   88697             

      POTASSIUM     

                                        4.5 MMOL/L                   2011 

        

      

 

                    COMPREHENSIVE METABOLIC                   50794             

      PROT TOT      

                                        6.1 GM/DL                   2011  

         

    

 

                    COMPREHENSIVE METABOLIC                   34256             

      Glucose       

                                        93 MG/DL                   2011   

          

  

 

                    COMPREHENSIVE METABOLIC                   30418             

      BICARB        

                                        26 MMOL/L                   2011  

           

  

 

                    COMPREHENSIVE METABOLIC                   57327             

      ANION GAP     

                                        5 MEQ/L                   2011    

        

   

 

                    LIPID GROUP                   [id number]                   HDL TE

ST                  

                                        46 MG/DL                   2011   

             

 

                LIPID GROUP                   [id number]                   TRIG      

                

                          102 MG/DL                   2011                

 

                    LIPID GROUP                   [id number]                   TEST L

DL                  

                                        88 MG/DL                   2011   

             

 

                LIPID GROUP                   [id number]                   CHOL      

                

                          154 MG/DL                   2011                

 

                    LIPID GROUP                   [id number]                   RCHOL/

HDL                 

                                        3.35 RATIO                   2011 

               



                                                                                
                                                                                
                                                                                
                                                                                
                                                                                
                                                                                
                                                                                
                                                                                
                                                                                
                                                                                
                                                          



Review of Systems

                      



                    System                   Result                   Effective 

Dates               



 

                    Constitutional                   No fatigue                 

  2019        

       

 

                    Constitutional                   No fever                   

2019          

     

 

                    Ears/Nose/Throat/Neck                   No dizziness        

           

2019                

 

                    Ears/Nose/Throat/Neck                   No headache         

          2019

               

 

                    Ears/Nose/Throat/Neck                   No nasal discharge  

                 

2019                

 

                    Ears/Nose/Throat/Neck                   No otalgia          

         2019 

              

 

                    Ears/Nose/Throat/Neck                   No sinus congestion 

                  

2019                

 

                    Ears/Nose/Throat/Neck                   No sinusitis        

           

2019                

 

                    Ears/Nose/Throat/Neck                   No postnasal drip   

                

2019                

 

                    Ears/Nose/Throat/Neck                   No otorrhea         

          2019

               

 

                    Ears/Nose/Throat/Neck                   No sore throat      

             

2019                

 

                    Ears/Nose/Throat/Neck                   hearing loss        

           

2019                

 

                    Respiratory                   No cough                   2019             

  

 

                    Respiratory                   No dyspnea                   0

2019           

    

 

                    Gastrointestinal                   No abdominal pain        

           

2019                

 

                    Gastrointestinal                   No constipation          

         2019 

              

 

                    Gastrointestinal                   No diarrhea              

     2019     

          

 

                    Dermatologic                   No rash                   2019             

  

 

                    Dermatologic                   No sores                               

   

 

                    Musculoskeletal                   No myalgias               

    2019      

         

 

                    Cardiovascular                   arrhythmia                 

  06/10/2019        

       

 

                    Cardiovascular                   No chest pain/pressure     

              

06/10/2019                

 

                    Cardiovascular                   No edema                   

06/10/2019          

     

 

                    Cardiovascular                   No exercise intolerance    

               

06/10/2019                

 

                    Cardiovascular                   No orthopnea               

    06/10/2019      

         

 

                    Cardiovascular                   No palpitations            

       06/10/2019   

            

 

                    Cardiovascular                   hypertension               

    06/10/2019      

         

 

                    Gastrointestinal                   gastroesophageal reflux  

                 

06/10/2019                

 

                    Respiratory                   No asthma                   06

/10/2019            

   

 

                    Respiratory                   No cough                   06/

10/2019             

  

 

                    Respiratory                   No dyspnea                   0

6/10/2019           

    

 

                    Respiratory                   No pleuritic pain             

      06/10/2019    

           

 

                    Respiratory                   No productive sputum          

         06/10/2019 

              

 

                    Respiratory                   No wheezing                   

06/10/2019          

     

 

                    Musculoskeletal                   back pain                 

  06/10/2019        

       

 

                    Constitutional                   fatigue                   0

6/10/2019           

    

 

                    Gastrointestinal                   No hemorrhoids           

        2019  

             

 

                    Gastrointestinal                   No hepatitis             

      2019    

           

 

                    Gastrointestinal                   No abdominal pain        

           

2019                

 

                    Gastrointestinal                   No constipation          

         2019 

              

 

                    Gastrointestinal                   No diarrhea              

     2019     

          

 

                    Gastrointestinal                   No gastroesophageal reflu

x                   

2019                

 

                    Gastrointestinal                   No melena                

   2019       

        

 

                    Gastrointestinal                   No nausea                

   2019       

        

 

                    Gastrointestinal                   No vomiting              

     2019     

          

 

                    Cardiovascular                   hypertension               

    2019      

         

 

                    Cardiovascular                   palpitations               

    2019      

         

 

                    Respiratory                   No asthma                               

   

 

                    Respiratory                   No cough                   2019             

  

 

                    Respiratory                   No dyspnea                   0

3/06/2019           

    

 

                    Respiratory                   No pleuritic pain             

      2019    

           

 

                    Respiratory                   No productive sputum          

         2019 

              

 

                    Respiratory                   No wheezing                   

2019          

     

 

                          Genitourinary/Nephrology                   No urinary 

retention/hesitancy       

                                        2019                

 

                    Gastrointestinal                   abdominal pain           

        2018  

             

 

                    Gastrointestinal                   dyspepsia                

   2018       

        

 

                    Gastrointestinal                   gastroesophageal reflux  

                 

2018                

 

                    Ears/Nose/Throat/Neck                   No hearing loss     

              

2018                

 

                    Ears/Nose/Throat/Neck                   No nasal discharge  

                 

2018                

 

                    Ears/Nose/Throat/Neck                   No sinus congestion 

                  

2018                

 

                    Ears/Nose/Throat/Neck                   No sore throat      

             

2018                

 

                    Cardiovascular                   No arrhythmia              

     2018     

          

 

                    Cardiovascular                   No chest pain/pressure     

              

2018                

 

                    Cardiovascular                   No edema                   

2018          

     

 

                    Cardiovascular                   No exercise intolerance    

               

2018                

 

                    Cardiovascular                   No orthopnea               

    2018      

         

 

                    Cardiovascular                   No palpitations            

       2018   

            

 

                    Cardiovascular                   hypertension               

    2018      

         

 

                    Respiratory                   No asthma                               

   

 

                    Respiratory                   No cough                   2018             

  

 

                    Respiratory                   No dyspnea                   1

2018           

    

 

                    Respiratory                   No pleuritic pain             

      2018    

           

 

                    Respiratory                   No productive sputum          

         2018 

              

 

                    Respiratory                   No wheezing                   

2018          

     

 

                    Gastrointestinal                   No hemorrhoids           

        2018  

             

 

                    Gastrointestinal                   No hepatitis             

      2018    

           

 

                    Gastrointestinal                   No abdominal pain        

           

2018                

 

                    Gastrointestinal                   No constipation          

         2018 

              

 

                    Gastrointestinal                   No diarrhea              

     2018     

          

 

                    Gastrointestinal                   No gastroesophageal reflu

x                   

2018                

 

                    Gastrointestinal                   No melena                

   2018       

        

 

                    Gastrointestinal                   No nausea                

   2018       

        

 

                    Gastrointestinal                   No vomiting              

     2018     

          

 

                    Genitourinary/Nephrology                   No dysuria       

            

2018                

 

                    Genitourinary/Nephrology                   No nocturia      

             

2018                

 

                          Genitourinary/Nephrology                   No urinary 

incontinence              

                                        2018                

 

                    Musculoskeletal                   No muscle weakness        

           

2018                

 

                    Musculoskeletal                   No myalgias               

    2018      

         

 

                    Musculoskeletal                   No stiffness              

     2018     

          

 

                    Musculoskeletal                   No swelling               

    2018      

         

 

                    Dermatologic                   No rash                   2018             

  

 

                    Dermatologic                   No scar                   2018             

  

 

                    Neurologic                   No dizziness                   

2018          

     

 

                    Neurologic                   No headache                   1

2018           

    

 

                    Neurologic                   No neck pain                   

2018          

     

 

                    Neurologic                   No syncope                               

   

 

                    Psychiatric                   anxiety                   2018              

 

 

                    Psychiatric                   No depression                 

  2018        

       

 

                    Endocrine                   No goiter                   2018              

 

 

                    Endocrine                   No hyperglycemia                

   2018       

        

 

                    Endocrine                   No hypoglycemia                 

  2018        

       

 

                    Endocrine                   hyperlipidemia                  

 2018         

      

 

                    Psychiatric                   stress                                  



 

                    Constitutional                   fatigue                   1

2018           

    

 

                    Gastrointestinal                   gastroesophageal reflux  

                 

10/02/2018                

 

                    Gastrointestinal                   abdominal pain           

        10/02/2018  

             

 

                    Cardiovascular                   fatigue                   1

           

    

 

                    Constitutional                   fatigue                   1

           

    

 

                    Cardiovascular                   palpitations               

    10/02/2018      

         

 

                    Cardiovascular                   arrhythmia                 

  10/02/2018        

       

 

                    Respiratory                   No asthma                   10

/2018            

   

 

                    Respiratory                   No cough                   10/

2018             

  

 

                    Respiratory                   No dyspnea                   1

           

    

 

                    Respiratory                   No pleuritic pain             

      10/02/2018    

           

 

                    Respiratory                   No productive sputum          

         10/02/2018 

              

 

                    Respiratory                   No wheezing                   

10/02/2018          

     

 

                    Musculoskeletal                   No muscle weakness        

           

10/02/2018                

 

                    Musculoskeletal                   No myalgias               

    10/02/2018      

         

 

                    Musculoskeletal                   No stiffness              

     10/02/2018     

          

 

                    Musculoskeletal                   No swelling               

    10/02/2018      

         

 

                    Neurologic                   No dizziness                   

10/02/2018          

     

 

                    Neurologic                   No headache                   1

           

    

 

                    Neurologic                   No neck pain                   

10/02/2018          

     

 

                    Neurologic                   No syncope                   10

/2018            

   

 

                    Psychiatric                   anxiety                   10/0

2018              

 

 

                    Psychiatric                   No depression                 

  10/02/2018        

       

 

                    Psychiatric                   stress                   10/02

/2018               



 

                    Gastrointestinal                   gastroesophageal reflux  

                 

2018                

 

                    Gastrointestinal                   abdominal pain           

        2018  

             

 

                    Respiratory                   No asthma                               

   

 

                    Respiratory                   No cough                                

  

 

                    Respiratory                   No dyspnea                   0

2018           

    

 

                    Respiratory                   No pleuritic pain             

      2018    

           

 

                    Respiratory                   No productive sputum          

         2018 

              

 

                    Respiratory                   No wheezing                   

2018          

     

 

                    Cardiovascular                   chest pain/pressure        

           

2018                

 

                    Psychiatric                   anxiety                                 

 

 

                    Musculoskeletal                   No muscle weakness        

           

2018                

 

                    Musculoskeletal                   No myalgias               

    2018      

         

 

                    Musculoskeletal                   No stiffness              

     2018     

          

 

                    Musculoskeletal                   No swelling               

    2018      

         

 

                    Psychiatric                   stress                                  



 

                    Constitutional                   fatigue                   0

2018           

    

 

                    Cardiovascular                   hypertension               

    2018      

         

 

                    Psychiatric                   anxiety                                 

 

 

                    Psychiatric                   stress                                  



 

                    Neurologic                   headache                                 

 

 

                    Neurologic                   dizziness                                

  

 

                    Constitutional                   fatigue                   0

2018           

    

 

                    Neurologic                   dizziness                                

  

 

                    Cardiovascular                   hypertension               

    2018      

         

 

                    Cardiovascular                   arrhythmia                 

  2018        

       

 

                    Constitutional                   No chills                  

 2018         

      

 

                    Constitutional                   No fever                   

2018          

     

 

                    Constitutional                   No malaise                 

  2018        

       

 

                    Musculoskeletal                   No muscle weakness        

           

2018                

 

                    Musculoskeletal                   No myalgias               

    2018      

         

 

                    Musculoskeletal                   No stiffness              

     2018     

          

 

                    Musculoskeletal                   No swelling               

    2018      

         

 

                    Neurologic                   No dizziness                   

2018          

     

 

                    Neurologic                   No headache                   0

2018           

    

 

                    Neurologic                   No neck pain                   

2018          

     

 

                    Neurologic                   No syncope                               

   

 

                    Constitutional                   No fever                   

2018          

     

 

                    Constitutional                   No fatigue                 

  2018        

       

 

                    Ears/Nose/Throat/Neck                   otalgia             

      2018    

           

 

                    Ears/Nose/Throat/Neck                   No postnasal drip   

                

2018                

 

                    Ears/Nose/Throat/Neck                   No otorrhea         

          2018

               

 

                    Ears/Nose/Throat/Neck                   No sore throat      

             

2018                

 

                    Ears/Nose/Throat/Neck                   No sinusitis        

           

2018                

 

                    Ears/Nose/Throat/Neck                   No sinus congestion 

                  

2018                

 

                    Respiratory                   No cough                                

  

 

                    Gastrointestinal                   No abdominal pain        

           

2018                

 

                    Gastrointestinal                   No constipation          

         2018 

              

 

                    Neurologic                   No headache                   0

3/26/2018           

    

 

                    Neurologic                   dizziness                                

  

 

                    Neurologic                   No tinnitus                   0

3/26/2018           

    

 

                    Neurologic                   vertigo                                  



 

                    Musculoskeletal                   back pain                 

  2018        

       

 

                    Musculoskeletal                   joint complaint           

        2018  

             

 

                    Gastrointestinal                   abdominal pain           

        2018  

             

 

                    Musculoskeletal                   No muscle weakness        

           

2017                

 

                    Musculoskeletal                   No myalgias               

    2017      

         

 

                    Musculoskeletal                   No stiffness              

     2017     

          

 

                    Musculoskeletal                   No swelling               

    2017      

         

 

                    Musculoskeletal                   low back pain             

      2017    

           

 

                    Musculoskeletal                   thoracic back pain        

           

2017                

 

                    Neurologic                   gait abnormality               

    2017      

         

 

                    Musculoskeletal                   low back pain             

      2017    

           

 

                    Gastrointestinal                   abdominal pain           

        2017  

             

 

                    Constitutional                   No night sweats            

       2017   

            

 

                    Constitutional                   No chills                  

 2017         

      

 

                    Constitutional                   No fatigue                 

  2017        

       

 

                    Constitutional                   No fever                   

2017          

     

 

                    Eyes                   No eye discharge                               

   

 

                    Eyes                   No eye pain                   

017                

 

                    Eyes                   No vision change                               

   

 

                    Ears/Nose/Throat/Neck                   No dizziness        

           

2017                

 

                          Ears/Nose/Throat/Neck                   eustachian tub

e dysfunction             

                                        2017                

 

                    Ears/Nose/Throat/Neck                   No headache         

          2017

               

 

                    Ears/Nose/Throat/Neck                   nasal discharge     

              

2017                

 

                    Ears/Nose/Throat/Neck                   postnasal drip      

             

2017                

 

                    Ears/Nose/Throat/Neck                   sinus congestion    

               

2017                

 

                    Ears/Nose/Throat/Neck                   sore throat         

          2017

               

 

                    Cardiovascular                   No chest pain/pressure     

              

2017                

 

                    Cardiovascular                   No dyspnea                 

  2017        

       

 

                    Cardiovascular                   No orthopnea               

    2017      

         

 

                    Cardiovascular                   No palpitations            

       2017   

            

 

                    Cardiovascular                   No syncope                 

  2017        

       

 

                    Respiratory                   No chest tightness            

       2017   

            

 

                    Respiratory                   cough                   2017                

 

                    Respiratory                   No dyspnea                   0

2017           

    

 

                    Respiratory                   No wheezing                   

2017          

     

 

                    Gastrointestinal                   No diarrhea              

     2017     

          

 

                    Gastrointestinal                   No nausea                

   2017       

        

 

                    Gastrointestinal                   No vomiting              

     2017     

          

 

                          Hematologic/Lymphatic                   No lymph node 

enlargement/mass          

                                        2017                

 

                    Ears/Nose/Throat/Neck                   No facial pain      

             

2017                

 

                    Respiratory                   No chest congestion           

        2017  

             

 

                    Dermatologic                   No rash                   2017             

  

 

                    Dermatologic                   No scar                   2017             

  

 

                    Constitutional                   fatigue                   1

2016           

    

 

                    Constitutional                   fever                   2016             

  

 

                    Ears/Nose/Throat/Neck                   otalgia             

      2016    

           

 

                    Ears/Nose/Throat/Neck                   No sinus congestion 

                  

2016                

 

                    Ears/Nose/Throat/Neck                   No sinusitis        

           

2016                

 

                    Respiratory                   No asthma                               

   

 

                    Respiratory                   No cough                   2016             

  

 

                    Respiratory                   No dyspnea                   1

2016           

    

 

                    Respiratory                   No pleuritic pain             

      2016    

           

 

                    Respiratory                   No productive sputum          

         2016 

              

 

                    Respiratory                   No wheezing                   

2016          

     

 

                    Dermatologic                   No rash                   2016             

  

 

                    Dermatologic                   No scar                   2016             

  

 

                    Gastrointestinal                   No hemorrhoids           

        2016  

             

 

                    Gastrointestinal                   No hepatitis             

      2016    

           

 

                    Gastrointestinal                   No abdominal pain        

           

2016                

 

                    Gastrointestinal                   constipation             

      2016    

           

 

                    Gastrointestinal                   No diarrhea              

     2016     

          

 

                    Gastrointestinal                   No gastroesophageal reflu

x                   

2016                

 

                    Gastrointestinal                   No melena                

   2016       

        

 

                    Gastrointestinal                   No nausea                

   2016       

        

 

                    Gastrointestinal                   No vomiting              

     2016     

          

 

                    Cardiovascular                   No arrhythmia              

     2016     

          

 

                    Cardiovascular                   No chest pain/pressure     

              

2016                

 

                    Cardiovascular                   No edema                   

2016          

     

 

                    Cardiovascular                   No exercise intolerance    

               

2016                

 

                    Cardiovascular                   No orthopnea               

    2016      

         

 

                    Cardiovascular                   No palpitations            

       2016   

            

 

                    Cardiovascular                   hypertension               

    2016      

         

 

                    Respiratory                   No asthma                               

   

 

                    Respiratory                   No cough                                

  

 

                    Respiratory                   No dyspnea                   0

3/16/2016           

    

 

                    Respiratory                   No pleuritic pain             

      2016    

           

 

                    Respiratory                   No productive sputum          

         2016 

              

 

                    Respiratory                   No wheezing                   

2016          

     

 

                    Gastrointestinal                   No hemorrhoids           

        2016  

             

 

                    Gastrointestinal                   No hepatitis             

      2016    

           

 

                    Gastrointestinal                   No abdominal pain        

           

2016                

 

                    Gastrointestinal                   No constipation          

         2016 

              

 

                    Gastrointestinal                   No diarrhea              

     2016     

          

 

                    Gastrointestinal                   No gastroesophageal reflu

x                   

2016                

 

                    Gastrointestinal                   No melena                

   2016       

        

 

                    Gastrointestinal                   No nausea                

   2016       

        

 

                    Gastrointestinal                   No vomiting              

     2016     

          

 

                    Genitourinary/Nephrology                   No dysuria       

            

2016                

 

                    Genitourinary/Nephrology                   No nocturia      

             

2016                

 

                          Genitourinary/Nephrology                   No urinary 

incontinence              

                                        2016                

 

                    Musculoskeletal                   No muscle weakness        

           

2016                

 

                    Musculoskeletal                   No myalgias               

    2016      

         

 

                    Musculoskeletal                   No stiffness              

     2016     

          

 

                    Musculoskeletal                   No swelling               

    2016      

         

 

                    Dermatologic                   No rash                                

  

 

                    Dermatologic                   No scar                                

  

 

                    Neurologic                   No dizziness                   

2016          

     

 

                    Neurologic                   No headache                   0

3/16/2016           

    

 

                    Neurologic                   No neck pain                   

2016          

     

 

                    Neurologic                   No syncope                               

   

 

                    Psychiatric                   No anxiety                   0

3/16/2016           

    

 

                    Psychiatric                   No depression                 

  2016        

       

 

                    Endocrine                   No goiter                   03/1

2016              

 

 

                    Endocrine                   No hyperglycemia                

   2016       

        

 

                    Endocrine                   No hypoglycemia                 

  2016        

       

 

                    Ears/Nose/Throat/Neck                   No hearing loss     

              

2016                

 

                    Ears/Nose/Throat/Neck                   No nasal discharge  

                 

2016                

 

                    Ears/Nose/Throat/Neck                   No sinus congestion 

                  

2016                

 

                    Ears/Nose/Throat/Neck                   No sore throat      

             

2016                

 

                    Constitutional                   insomnia                   

2016          

     

 

                    Constitutional                   fatigue                   1

2015           

    

 

                    Constitutional                   fever                                

  

 

                    Ears/Nose/Throat/Neck                   otalgia             

      2015    

           

 

                    Ears/Nose/Throat/Neck                   No sinus congestion 

                  

2015                

 

                    Ears/Nose/Throat/Neck                   No sinusitis        

           

2015                

 

                    Respiratory                   No asthma                               

   

 

                    Respiratory                   No cough                                

  

 

                    Respiratory                   No dyspnea                   1

2015           

    

 

                    Respiratory                   No pleuritic pain             

      2015    

           

 

                    Respiratory                   No productive sputum          

         2015 

              

 

                    Respiratory                   No wheezing                   

2015          

     

 

                    Dermatologic                   No rash                                

  

 

                    Dermatologic                   No scar                                

  

 

                    Neurologic                   dizziness                                

  

 

                    Psychiatric                   No anxiety                   1

2015           

    

 

                    Psychiatric                   No depression                 

  2015        

       

 

                    Endocrine                   No goiter                                 

 

 

                    Endocrine                   No hyperglycemia                

   2015       

        

 

                    Endocrine                   No hypoglycemia                 

  2015        

       

 

                    Musculoskeletal                   No muscle weakness        

           

2015                

 

                    Musculoskeletal                   No myalgias               

    2015      

         

 

                    Musculoskeletal                   No stiffness              

     2015     

          

 

                    Musculoskeletal                   No swelling               

    2015      

         

 

                    Genitourinary/Nephrology                   No dysuria       

            

2015                

 

                    Genitourinary/Nephrology                   No nocturia      

             

2015                

 

                          Genitourinary/Nephrology                   No urinary 

incontinence              

                                        2015                

 

                    Gastrointestinal                   No hemorrhoids           

        2015  

             

 

                    Gastrointestinal                   No hepatitis             

      2015    

           

 

                    Gastrointestinal                   No abdominal pain        

           

2015                

 

                    Gastrointestinal                   No constipation          

         2015 

              

 

                    Gastrointestinal                   No diarrhea              

     2015     

          

 

                    Gastrointestinal                   No gastroesophageal reflu

x                   

2015                

 

                    Gastrointestinal                   No melena                

   2015       

        

 

                    Gastrointestinal                   No nausea                

   2015       

        

 

                    Gastrointestinal                   No vomiting              

     2015     

          

 

                    Cardiovascular                   No arrhythmia              

     2015     

          

 

                    Cardiovascular                   No chest pain/pressure     

              

2015                

 

                    Cardiovascular                   edema                                

  

 

                    Cardiovascular                   No exercise intolerance    

               

2015                

 

                    Cardiovascular                   No orthopnea               

    2015      

         

 

                    Cardiovascular                   No palpitations            

       2015   

            

 

                    Ears/Nose/Throat/Neck                   No hearing loss     

              

2015                

 

                    Ears/Nose/Throat/Neck                   No nasal discharge  

                 

2015                

 

                    Ears/Nose/Throat/Neck                   No sore throat      

             

2015                

 

                    Ears/Nose/Throat/Neck                   dizziness           

        2015  

             

 

                    Cardiovascular                   hypertension               

    2015      

         

 

                    Musculoskeletal                   arthralgia(s)             

      2015    

           

 

                    Musculoskeletal                   low back pain             

      2015    

           

 

                    Musculoskeletal                   back pain                 

  2015        

       

 

                    Neurologic                   vertigo                                  



 

                    Psychiatric                   stress                                  



 

                    Endocrine                   hyperlipidemia                  

 2015         

      

 

                          Hematologic/Lymphatic                   No abnormal ec

chymoses                  

                                        2015                

 

                    Hematologic/Lymphatic                   No petechiae        

           

2015                

 

                          Hematologic/Lymphatic                   No abnormal bl

eeding and bruising       

                                        2015                

 

                    Hematologic/Lymphatic                   No anemia           

        2015  

             

 

                          Hematologic/Lymphatic                   No lymph node 

enlargement/mass          

                                        2015                

 

                    Constitutional                   fatigue                   0

2015           

    

 

                    Psychiatric                   stress                                  



 

                    Psychiatric                   depression                   0

2015           

    

 

                    Ears/Nose/Throat/Neck                   otalgia             

      2015    

           

 

                    Ears/Nose/Throat/Neck                   No sore throat      

             

2015                

 

                    Ears/Nose/Throat/Neck                   No sinus congestion 

                  

2015                

 

                    Constitutional                   No fever                   

2015          

     

 

                    Respiratory                   No cough                                

  

 

                    Neurologic                   pain, limb                               

   

 

                    Cardiovascular                   chest pain/pressure        

           

2015                

 

                    Musculoskeletal                   back pain                 

  2015        

       

 

                    Musculoskeletal                   muscle spasm              

     2015     

          

 

                    Neurologic                   dizziness                                

  

 

                    Cardiovascular                   hypertension               

    2015      

         

 

                    Dermatologic                   skin lesion                  

 2015         

      

 

                    Ears/Nose/Throat/Neck                   dizziness           

        2014  

             

 

                    Ears/Nose/Throat/Neck                   facial pain         

          2014

               

 

                    Ears/Nose/Throat/Neck                   sinusitis           

        2014  

             

 

                    Ears/Nose/Throat/Neck                   otalgia             

      2014    

           

 

                    Respiratory                   cough                   2014                

 

                    Constitutional                   No fever                   

2014          

     

 

                    Musculoskeletal                   back pain                 

  2014        

       

 

                    Respiratory                   cough                   2014                

 

                          Ears/Nose/Throat/Neck                   eustachian tub

e dysfunction             

                                        2014                

 

                    Ears/Nose/Throat/Neck                   sinus congestion    

               

2014                

 

                    Ears/Nose/Throat/Neck                   sinusitis           

        2014  

             

 

                    Ears/Nose/Throat/Neck                   headache            

       2014   

            

 

                    Ears/Nose/Throat/Neck                   sinusitis           

        2014  

             

 

                    Ears/Nose/Throat/Neck                   sore throat         

          2014

               

 

                    Respiratory                   cough                   2014                

 

                    Constitutional                   No fever                   

2014          

     

 

                    Constitutional                   No recent illness          

         2014 

              

 

                    Respiratory                   cough                   10/15/

2013                

 

                    Cardiovascular                   hypertension               

    10/15/2013      

         

 

                    Endocrine                   hyperlipidemia                  

 10/15/2013         

      

 

                    Endocrine                   obesity                   10/15/

2013                

 

                    Gastrointestinal                   dyspepsia                

   10/15/2013       

        

 

                    Constitutional                   No fever                   

10/15/2013          

     

 

                    Cardiovascular                   hypertension               

    2013      

         

 

                    Neurologic                   dizziness                                

  

 

                    Ears/Nose/Throat/Neck                   sinusitis           

        2013  

             

 

                    Ears/Nose/Throat/Neck                   sinus congestion    

               

2013                

 

                    Ears/Nose/Throat/Neck                   dizziness           

        2013  

             

 

                    Musculoskeletal                   back pain                 

  2013        

       

 

                    Ears/Nose/Throat/Neck                   No hearing loss     

              

2013                

 

                    Ears/Nose/Throat/Neck                   No nasal discharge  

                 

2013                

 

                    Ears/Nose/Throat/Neck                   No sinus congestion 

                  

2013                

 

                    Ears/Nose/Throat/Neck                   No sore throat      

             

2013                

 

                    Constitutional                   No chills                  

 05/10/2013         

      

 

                    Constitutional                   No anorexia                

   05/10/2013       

        

 

                    Constitutional                   recent illness             

      05/10/2013    

           

 

                    Constitutional                   No fever                   

05/10/2013          

     

 

                    Constitutional                   No fatigue                 

  05/10/2013        

       

 

                    Constitutional                   No malaise                 

  05/10/2013        

       

 

                    Constitutional                   No insomnia                

   05/10/2013       

        

 

                    Ears/Nose/Throat/Neck                   sore throat         

          05/10/2013

               

 

                    Ears/Nose/Throat/Neck                   otalgia             

      05/10/2013    

           

 

                    Ears/Nose/Throat/Neck                   sinus congestion    

               

05/10/2013                

 

                    Ears/Nose/Throat/Neck                   headache            

       05/10/2013   

            

 

                    Respiratory                   No cough                   05/

10/2013             

  

 

                    Genitourinary/Nephrology                   breast complaint 

                  

2013                

 

                    Gastrointestinal                   abdominal pain           

        2012  

             

 

                    Gastrointestinal                   dyspepsia                

   2012       

        

 

                    Gastrointestinal                   gastroesophageal reflux  

                 

2012                

 

                    Neurologic                   dizziness                                

  

 

                    Ears/Nose/Throat/Neck                   otalgia             

      2012    

           

 

                    Gastrointestinal                   No hemorrhoids           

        10/23/2012  

             

 

                    Gastrointestinal                   No hepatitis             

      10/23/2012    

           

 

                    Gastrointestinal                   abdominal pain           

        10/23/2012  

             

 

                    Gastrointestinal                   No constipation          

         10/23/2012 

              

 

                    Gastrointestinal                   No diarrhea              

     10/23/2012     

          

 

                    Gastrointestinal                   gastroesophageal reflux  

                 

10/23/2012                

 

                    Gastrointestinal                   No melena                

   10/23/2012       

        

 

                    Gastrointestinal                   No nausea                

   10/23/2012       

        

 

                    Gastrointestinal                   No vomiting              

     10/23/2012     

          

 

                    Gastrointestinal                   gas and bloating         

          10/23/2012

               

 

                    Neurologic                   dizziness                                

  

 

                    Cardiovascular                   hypertension               

    2012      

         

 

                    Endocrine                   hypoglycemia                   0

2012           

    

 

                    Constitutional                   fatigue                   0

2012           

    

 

                    Constitutional                   fatigue                   0

2012           

    

 

                    Neurologic                   dizziness                   2012             

  

 

                    Musculoskeletal                   muscle weakness           

        2012  

             

 

                    Cardiovascular                   hypertension               

    2012      

         

 

                    Endocrine                   diabetes mellitus type 2        

           

2012                

 

                    Musculoskeletal                   sciatica                  

 2012         

      

 

                    Neurologic                   pain, limb                               

   

 

                    Musculoskeletal                   back pain                 

  2012        

       

 

                    Musculoskeletal                   low back pain             

      2012    

           

 

                    Neurologic                   pain, limb                               

   

 

                    Musculoskeletal                   back pain                 

  2012        

       

 

                    Musculoskeletal                   joint complaint           

        2012  

             

 

                    Musculoskeletal                   back pain                 

  2012        

       

 

                    Musculoskeletal                   back pain                 

  2012        

       

 

                    Neurologic                   pain, limb                               

   

 

                    Neurologic                   pain, back                               

   

 

                    Constitutional                   fatigue                   0

2012           

    

 

                    Constitutional                   fatigue                   0

2012           

    

 

                    Cardiovascular                   No hypertension            

       2012   

            

 

                    Cardiovascular                   fatigue                   0

2012           

    

 

                    Cardiovascular                   No chest pain/pressure     

              

2012                

 

                    Respiratory                   No asthma                               

   

 

                    Respiratory                   No pleuritic pain             

      2012    

           

 

                    Respiratory                   No productive sputum          

         2012 

              

 

                    Respiratory                   No cough                                

  

 

                    Respiratory                   No dyspnea                   0

2012           

    

 

                    Respiratory                   No wheezing                   

2012          

     

 

                    Gastrointestinal                   No hemorrhoids           

        2012  

             

 

                    Gastrointestinal                   No hepatitis             

      2012    

           

 

                    Gastrointestinal                   No abdominal pain        

           

2012                

 

                    Gastrointestinal                   No constipation          

         2012 

              

 

                    Gastrointestinal                   No diarrhea              

     2012     

          

 

                    Gastrointestinal                   No gastroesophageal reflu

x                   

2012                

 

                    Gastrointestinal                   No melena                

   2012       

        

 

                    Gastrointestinal                   No nausea                

   2012       

        

 

                    Gastrointestinal                   No vomiting              

     2012     

          

 

                    Genitourinary/Nephrology                   No dysuria       

            

2012                

 

                    Genitourinary/Nephrology                   No nocturia      

             

2012                

 

                          Genitourinary/Nephrology                   No urinary 

incontinence              

                                        2012                

 

                    Musculoskeletal                   arthralgia(s)             

      2012    

           

 

                    Dermatologic                   No rash                                

  

 

                    Dermatologic                   No scar                                

  

 

                    Neurologic                   No dizziness                   

2012          

     

 

                    Neurologic                   No headache                   0

2012           

    

 

                    Neurologic                   No neck pain                   

2012          

     

 

                    Neurologic                   No syncope                               

   

 

                    Psychiatric                   No anxiety                   0

2012           

    

 

                    Psychiatric                   No depression                 

  2012        

       

 

                    Endocrine                   No goiter                                 

 

 

                    Endocrine                   No hyperglycemia                

   2012       

        

 

                    Endocrine                   No hypoglycemia                 

  2012        

       

 

                    Ears/Nose/Throat/Neck                   No hearing loss     

              

2011                

 

                    Ears/Nose/Throat/Neck                   No nasal discharge  

                 

2011                

 

                    Ears/Nose/Throat/Neck                   No sinus congestion 

                  

2011                

 

                    Ears/Nose/Throat/Neck                   sore throat         

          2011

               

 

                    Ears/Nose/Throat/Neck                   sinusitis           

        2011  

             

 

                    Ears/Nose/Throat/Neck                   otalgia             

      2011    

           

 

                    Neurologic                   headache                   12/0

2011              

 

 

                    Respiratory                   cough                   2011                

 

                    Musculoskeletal                   back pain                 

  2011        

       

 

                    Gastrointestinal                   abdominal pain           

        2011  

             

 

                    Neurologic                   dizziness                   2011             

  

 

                    Gastrointestinal                   No abdominal pain        

           

2011                

 

                    Gastrointestinal                   gastroesophageal reflux  

                 

2011                

 

                    Gastrointestinal                   No dyspepsia             

      2011    

           

 

                    Gastrointestinal                   No diarrhea              

     2011     

          

 

                    Gastrointestinal                   No constipation          

         2011 

              

 

                    Gastrointestinal                   No nausea                

   2011       

        

 

                    Musculoskeletal                   back pain                 

  2011        

       

 

                    Gastrointestinal                   abdominal pain           

        2011  

             

 

                    Neurologic                   dizziness                                

  

 

                    Ears/Nose/Throat/Neck                   otitis media        

           

2011                

 

                    Ears/Nose/Throat/Neck                   otalgia             

      2011    

           

 

                    Gastrointestinal                   gastroesophageal reflux  

                 

2011                

 

                    Musculoskeletal                   back pain                 

  2011        

       

 

                    Musculoskeletal                   low back pain             

      2011    

           

 

                    Musculoskeletal                   back pain                 

  2011        

       

 

                    Musculoskeletal                   low back pain             

      2011    

           

 

                    Constitutional                   fatigue                   0

2011           

    

 

                    Psychiatric                   stress                                  



 

                    Psychiatric                   depression                   0

2011           

    

 

                    Constitutional                   fever                                

  

 

                    Ears/Nose/Throat/Neck                   headache            

       2010   

            

 

                    Ears/Nose/Throat/Neck                   nasal discharge     

              

2010                

 

                    Ears/Nose/Throat/Neck                   sore throat         

          2010

               

 

                    Ears/Nose/Throat/Neck                   sinusitis           

        2010  

             

 

                    Ears/Nose/Throat/Neck                   sinus congestion    

               

2010                

 

                    Ears/Nose/Throat/Neck                   No tonsillitis      

             

2010                

 

                    Respiratory                   cough                   2010                

 

                    Respiratory                   nocturnal cough               

    2010      

         

 

                    Gastrointestinal                   No diarrhea              

     2010     

          

 

                    Gastrointestinal                   No constipation          

         2010 

              

 

                    Gastrointestinal                   nausea                   

2010          

     

 

                    Gastrointestinal                   No vomiting              

     2010     

          

 

                    Dermatologic                   No rash                                

  

 

                    Dermatologic                   No sores                               

   

 

                    Constitutional                   fatigue                   0

2010           

    

 

                    Gastrointestinal                   gastroesophageal reflux  

                 

2010                

 

                    Psychiatric                   anxiety                                 

 

 

                    Psychiatric                   stress                                  



 

                    Genitourinary/Nephrology                   urinary urgency  

                 

2010                

 

                    Constitutional                   fatigue                   0

2010           

    

 

                    Neurologic                   weakness                                 

 

 

                    Psychiatric                   anxiety                                 

 

 

                    Psychiatric                   stress                                  



 

                    Neurologic                   dyskinesia or tremor           

        2010  

             

 

                    Gastrointestinal                   constipation             

      2010    

           

 

                    Gastrointestinal                   gastroesophageal reflux  

                 

2010                

 

                    Gastrointestinal                   gas and bloating         

          2010

               

 

                    Constitutional                   fatigue                   0

2010           

    

 

                    Constitutional                   fatigue                   0

2010           

    

 

                    Psychiatric                   anxiety                   04/0

2010              

 

 

                    Psychiatric                   depression                   0

2010           

    

 

                    Musculoskeletal                   muscle weakness           

        2010  

             

 

                    Gastrointestinal                   abdominal pain           

        2010  

             

 

                    Cardiovascular                   hypertension               

    2010      

         



                                                                    



Physical Exam

                      



                    Exam Name                   System Name                   It

em Name             

                    Status                   Result                   Effective 

Dates          

                                        Notes                

 

                    Full Exam - General                   Constitutional        

            general 

appearance                   Overall:                   well nourished          

                          2019                   None                

 

                    Full Exam - General                   Constitutional        

            general 

appearance                   Overall:                   in no acute distress    

                          2019                   None                

 

                    Full Exam - General                   Ears/Nose/Throat      

             

otoscopic exam                   Left tympanic membrane:                   air-

fluid level                   2019                   None                

 

                    Full Exam - General                   Ears/Nose/Throat      

             

otoscopic exam                   Right tympanic membrane:                   air-

fluid level                   2019                   None                

 

                    Full Exam - General                   Ears/Nose/Throat      

             oral 

cavity/pharynx/larynx                    Oropharynx:                   a normal 

exam                      2019                   None                

 

                    Full Exam - General                   Respiratory           

        respiratory 

effort/rhythm                   Overall:                   no retractions       

                          2019                   None                

 

                    Full Exam - General                   Respiratory           

        respiratory 

effort/rhythm                   Overall:                   normal rate          

                          2019                   None                

 

                    Full Exam - General                   Constitutional        

            general 

appearance                   Overall:                   well nourished          

                          06/10/2019                   None                

 

                    Full Exam - General                   Constitutional        

            general 

appearance                   Overall:                   well developed          

                          06/10/2019                   None                

 

                    Full Exam - General                   Constitutional        

            general 

appearance                   Overall:                   in no acute distress    

                          06/10/2019                   None                

 

                    Full Exam - General                   Neurologic            

       mental status

                    Overall:                   alert                   06/10/201

9 

                                        None                

 

                    Full Exam - General                   Neurologic            

       mental status

                    Overall:                   oriented                   

06/10/2019                              None                

 

                    Full Exam - General                   Psychiatric           

        mood and 

affect                    Overall:                   normal mood and affect     

 

                          06/10/2019                   None                

 

                    Full Exam - General                   Respiratory           

        auscultation

                          Overall:                   breath sounds clear bilater

ally    

                          06/10/2019                   None                

 

                    Full Exam - General                   Cardiovascular        

           

auscultation of heart                   Overall:                   regular rate 

                          06/10/2019                   None                

 

                    Full Exam - General                   Cardiovascular        

           

auscultation of heart                   Overall:                   normal heart 

sounds                    06/10/2019                   None                

 

                    Full Exam - General                   Cardiovascular        

           

auscultation of heart                   S4 (atrial gallop):                   

present                   06/10/2019                   None                

 

                    Full Exam - General                   Cardiovascular        

           

auscultation of heart                   Murmur:                   previously 

known murmur unchanged                   06/10/2019                   None      

         

 

                    Full Exam - General                   Cardiovascular        

           

extremities                   Overall:                   no clubbing            

                          06/10/2019                   None                

 

                    Full Exam - General                   Cardiovascular        

           

extremities                   Overall:                   No cyanosis            

                          06/10/2019                   None                

 

                    Full Exam - General                   Cardiovascular        

           

extremities                   Overall:                   No edema               

                          06/10/2019                   None                

 

                    Full Exam - General                   Constitutional        

            general 

appearance                   Overall:                   well nourished          

                          2019                   None                

 

                    Full Exam - General                   Constitutional        

            general 

appearance                   Overall:                   well developed          

                          2019                   None                

 

                    Full Exam - General                   Constitutional        

            general 

appearance                   Overall:                   in no acute distress    

                          2019                   None                

 

                    Full Exam - General                   Neurologic            

       mental status

                    Overall:                   alert                   

9 

                                        None                

 

                    Full Exam - General                   Neurologic            

       mental status

                    Overall:                   oriented                   

2019                              None                

 

                    Full Exam - General                   Psychiatric           

        mood and 

affect                    Overall:                   normal mood and affect     

 

                          2019                   None                

 

                    Full Exam - General                   Abdomen               

    abdominal exam  

                    Overall:                   no masses                   

2019                              None                

 

                    Full Exam - General                   Abdomen               

    abdominal exam  

                          Overall:                   normal bowel sounds        

          

                          2019                   None                

 

                    Full Exam - General                   Abdomen               

    abdominal exam  

                    Overall:                   soft                   2019

    

                                        None                

 

                    Full Exam - General                   Respiratory           

        auscultation

                          Overall:                   breath sounds clear bilater

ally    

                          2019                   None                

 

                    Full Exam - General                   Cardiovascular        

           

auscultation of heart                   Overall:                   regular rate 

                          2019                   None                

 

                    Full Exam - General                   Cardiovascular        

           

auscultation of heart                   Overall:                   normal heart 

sounds                    2019                   None                

 

                    Full Exam - General                   Cardiovascular        

           

extremities                   Overall:                   no clubbing            

                          2019                   None                

 

                    Full Exam - General                   Cardiovascular        

           

extremities                   Overall:                   No edema               

                          2019                   None                

 

                    Full Exam - General                   Cardiovascular        

           

extremities                   Overall:                   No cyanosis            

                          2019                   None                

 

                    Full Exam - General                   Constitutional        

            general 

appearance                   Overall:                   well nourished          

                          2018                   None                

 

                    Full Exam - General                   Constitutional        

            general 

appearance                   Overall:                   well developed          

                          2018                   None                

 

                    Full Exam - General                   Constitutional        

            general 

appearance                   Overall:                   in no acute distress    

                          2018                   None                

 

                    Full Exam - General                   Neurologic            

       mental status

                    Overall:                   alert                   

8 

                                        None                

 

                    Full Exam - General                   Neurologic            

       mental status

                    Overall:                   oriented                   

2018                              None                

 

                    Full Exam - General                   Psychiatric           

        mood and 

affect                    Overall:                   normal mood and affect     

 

                          2018                   None                

 

                    Full Exam - General                   Abdomen               

    abdominal exam  

                    Overall:                   no masses                   

2018                              None                

 

                    Full Exam - General                   Abdomen               

    abdominal exam  

                          Overall:                   normal bowel sounds        

          

                          2018                   None                

 

                    Full Exam - General                   Abdomen               

    abdominal exam  

                    Overall:                   soft                   2018

    

                                        None                

 

                    Full Exam - General                   Abdomen               

    abdominal exam  

                          Epigastric:                   tender to palpation     

          

                          2018                   None                

 

                    Full Exam - General                   Abdomen               

    abdominal exam  

                          Left upper quadrant:                   tender to palpa

tion      

                          2018                   None                

 

                    Full Exam - General                   Respiratory           

        auscultation

                          Overall:                   breath sounds clear bilater

ally    

                          2018                   None                

 

                    Full Exam - General                   Cardiovascular        

           

auscultation of heart                   Overall:                   regular rate 

                          2018                   None                

 

                    Full Exam - General                   Cardiovascular        

           

auscultation of heart                   Overall:                   normal heart 

sounds                    2018                   None                

 

                    Full Exam - General                   Cardiovascular        

           

auscultation of heart                   S4 (atrial gallop):                   

present                   2018                   None                

 

                    Full Exam - General                   Cardiovascular        

           

auscultation of heart                   Murmur:                   previously 

known murmur unchanged                   2018                   None      

         

 

                    Full Exam - General                   Constitutional        

            general 

appearance                   Overall:                   well nourished          

                          2018                   None                

 

                    Full Exam - General                   Constitutional        

            general 

appearance                   Overall:                   well developed          

                          2018                   None                

 

                    Full Exam - General                   Constitutional        

            general 

appearance                   Overall:                   in no acute distress    

                          2018                   None                

 

                    Full Exam - General                   Neurologic            

       mental status

                    Overall:                   alert                   

8 

                                        None                

 

                    Full Exam - General                   Neurologic            

       mental status

                    Overall:                   oriented                   

2018                              None                

 

                    Full Exam - General                   Psychiatric           

        mood and 

affect                    Overall:                   normal mood and affect     

 

                          2018                   None                

 

                    Full Exam - General                   Respiratory           

        auscultation

                          Overall:                   breath sounds clear bilater

ally    

                          2018                   None                

 

                    Full Exam - General                   Cardiovascular        

           

auscultation of heart                   Overall:                   normal heart 

sounds                    2018                   None                

 

                    Full Exam - General                   Cardiovascular        

           

auscultation of heart                   S4 (atrial gallop):                   

present                   2018                   None                

 

                    Full Exam - General                   Cardiovascular        

           

auscultation of heart                   Rate:                     bradycardia   

  

                          2018                   None                

 

                    Full Exam - General                   Cardiovascular        

           

extremities                   Overall:                   no clubbing            

                          2018                   None                

 

                    Full Exam - General                   Cardiovascular        

           

extremities                   Overall:                   No edema               

                          2018                   None                

 

                    Full Exam - General                   Cardiovascular        

           

extremities                   Overall:                   No cyanosis            

                          2018                   None                

 

                    Full Exam - General                   Neck                  

 inspection of neck 

                    Overall:                   normal size                   

2018                              None                

 

                    Full Exam - General                   Neck                  

 inspection of neck 

                    Overall:                   no masses                   

2018                              None                

 

                    Full Exam - General                   Abdomen               

    abdominal exam  

                    Overall:                   no masses                   

2018                              None                

 

                    Full Exam - General                   Abdomen               

    abdominal exam  

                          Overall:                   normal bowel sounds        

          

                          2018                   None                

 

                    Full Exam - General                   Abdomen               

    abdominal exam  

                    Overall:                   soft                   2018

    

                                        None                

 

                    Full Exam - General                   Abdomen               

    abdominal exam  

                          Epigastric:                   tender to palpation     

          

                          2018                   None                

 

                    Full Exam - General                   Constitutional        

            general 

appearance                   Overall:                   well nourished          

                          10/02/2018                   None                

 

                    Full Exam - General                   Constitutional        

            general 

appearance                   Overall:                   well developed          

                          10/02/2018                   None                

 

                    Full Exam - General                   Constitutional        

            general 

appearance                   Overall:                   in no acute distress    

                          10/02/2018                   None                

 

                    Full Exam - General                   Neurologic            

       mental status

                    Overall:                   alert                   10/02/201

8 

                                        None                

 

                    Full Exam - General                   Neurologic            

       mental status

                    Overall:                   oriented                   

10/02/2018                              None                

 

                    Full Exam - General                   Psychiatric           

        mood and 

affect                    Overall:                   normal mood and affect     

 

                          10/02/2018                   None                

 

                    Full Exam - General                   Abdomen               

    abdominal exam  

                    Overall:                   no masses                   

10/02/2018                              None                

 

                    Full Exam - General                   Abdomen               

    abdominal exam  

                          Overall:                   normal bowel sounds        

          

                          10/02/2018                   None                

 

                    Full Exam - General                   Abdomen               

    abdominal exam  

                    Overall:                   soft                   10/02/2018

    

                                        None                

 

                    Full Exam - General                   Abdomen               

    abdominal exam  

                          Epigastric:                   tender to palpation     

          

                          10/02/2018                   None                

 

                    Full Exam - General                   Respiratory           

        auscultation

                          Overall:                   breath sounds clear bilater

ally    

                          10/02/2018                   None                

 

                    Full Exam - General                   Cardiovascular        

           

auscultation of heart                   Overall:                   regular rate 

                          10/02/2018                   None                

 

                    Full Exam - General                   Cardiovascular        

           

auscultation of heart                   Overall:                   normal heart 

sounds                    10/02/2018                   None                

 

                    Full Exam - General                   Cardiovascular        

           

auscultation of heart                   Murmur:                   previously 

known murmur unchanged                   10/02/2018                   None      

         

 

                    Full Exam - General                   Cardiovascular        

           

auscultation of heart                   S4 (atrial gallop):                   

present                   10/02/2018                   None                

 

                    Full Exam - General                   Cardiovascular        

           

extremities                   Overall:                   no clubbing            

                          10/02/2018                   None                

 

                    Full Exam - General                   Cardiovascular        

           

extremities                   Overall:                   No cyanosis            

                          10/02/2018                   None                

 

                    Full Exam - General                   Cardiovascular        

           

extremities                   Edema present:                   non-pitting      

                          10/02/2018                   None                

 

                    Full Exam - General                   Constitutional        

            general 

appearance                   Overall:                   well nourished          

                          2018                   None                

 

                    Full Exam - General                   Constitutional        

            general 

appearance                   Overall:                   well developed          

                          2018                   None                

 

                    Full Exam - General                   Constitutional        

            general 

appearance                   Evidence of Distress:                   anxious    

                          2018                   None                

 

                    Full Exam - General                   Respiratory           

        auscultation

                          Overall:                   breath sounds clear bilater

ally    

                          2018                   None                

 

                    Full Exam - General                   Cardiovascular        

           

auscultation of heart                   Overall:                   regular rate 

                          2018                   None                

 

                    Full Exam - General                   Cardiovascular        

           

auscultation of heart                   Overall:                   normal heart 

sounds                    2018                   None                

 

                    Full Exam - General                   Cardiovascular        

           

auscultation of heart                   Murmur:                   previously 

known murmur unchanged                   2018                   None      

         

 

                    Full Exam - General                   Cardiovascular        

           

auscultation of heart                   S4 (atrial gallop):                   

present                   2018                   None                

 

                    Full Exam - General                   Cardiovascular        

           

extremities                   Overall:                   no clubbing            

                          2018                   None                

 

                    Full Exam - General                   Cardiovascular        

           

extremities                   Overall:                   No edema               

                          2018                   None                

 

                    Full Exam - General                   Cardiovascular        

           

extremities                   Overall:                   No cyanosis            

                          2018                   None                

 

                    Full Exam - General                   Neurologic            

       mental status

                    Overall:                   oriented                   

2018                              None                

 

                    Full Exam - General                   Neurologic            

       mental status

                    Overall:                   alert                   201

8 

                                        None                

 

                    Full Exam - General                   Psychiatric           

        mood and 

affect                    Overall:                   normal mood and affect     

 

                          2018                   None                

 

                    Full Exam - General                   Abdomen               

    abdominal exam  

                    Overall:                   no masses                   

2018                              None                

 

                    Full Exam - General                   Abdomen               

    abdominal exam  

                          Overall:                   normal bowel sounds        

          

                          2018                   None                

 

                    Full Exam - General                   Abdomen               

    abdominal exam  

                    Overall:                   soft                   2018

    

                                        None                

 

                    Full Exam - General                   Abdomen               

    abdominal exam  

                          Epigastric:                   tender to palpation     

          

                          2018                   None                

 

                    Full Exam - General                   Constitutional        

            general 

appearance                   Evidence of Distress:                   tearful    

                          2018                   None                

 

                    Full Exam - General                   Constitutional        

            general 

appearance                   Overall:                   well nourished          

                          2018                   None                

 

                    Full Exam - General                   Constitutional        

            general 

appearance                   Overall:                   well developed          

                          2018                   None                

 

                    Full Exam - General                   Constitutional        

            general 

appearance                   Overall:                   in no acute distress    

                          2018                   None                

 

                    Full Exam - General                   Neurologic            

       mental status

                    Overall:                   alert                   

8 

                                        None                

 

                    Full Exam - General                   Neurologic            

       mental status

                    Overall:                   oriented                   

2018                              None                

 

                    Full Exam - General                   Respiratory           

        auscultation

                          Overall:                   breath sounds clear bilater

ally    

                          2018                   None                

 

                    Full Exam - General                   Cardiovascular        

           

auscultation of heart                   Overall:                   regular rate 

                          2018                   None                

 

                    Full Exam - General                   Cardiovascular        

           

auscultation of heart                   Overall:                   normal heart 

sounds                    2018                   None                

 

                    Full Exam - General                   Cardiovascular        

           

auscultation of heart                   S4 (atrial gallop):                   

present                   2018                   None                

 

                    Full Exam - General                   Cardiovascular        

           

auscultation of heart                   Murmur:                   previously 

known murmur unchanged                   2018                   None      

         

 

                    Full Exam - General                   Cardiovascular        

           

extremities                   Overall:                   no clubbing            

                          2018                   None                

 

                    Full Exam - General                   Cardiovascular        

           

extremities                   Overall:                   No edema               

                          2018                   None                

 

                    Full Exam - General                   Cardiovascular        

           

extremities                   Overall:                   No cyanosis            

                          2018                   None                

 

                    Full Exam - General                   Constitutional        

            general 

appearance                   Overall:                   well nourished          

                          2018                   None                

 

                    Full Exam - General                   Constitutional        

            general 

appearance                   Overall:                   in no acute distress    

                          2018                   None                

 

                    Full Exam - General                   Cardiovascular        

           

auscultation of heart                   Overall:                   regular rate 

                          2018                   None                

 

                    Full Exam - General                   Cardiovascular        

           

auscultation of heart                   Overall:                   no murmurs   

                          2018                   None                

 

                    Full Exam - General                   Respiratory           

        percussion  

                    Overall:                   benign percussion                

   

2018                              None                

 

                    Full Exam - General                   Respiratory           

        respiratory 

effort/rhythm                   Overall:                   no retractions       

                          2018                   None                

 

                    Full Exam - General                   Respiratory           

        respiratory 

effort/rhythm                   Overall:                   normal rate          

                          2018                   None                

 

                    Full Exam - General                   Respiratory           

        auscultation

                          Overall:                   breath sounds clear bilater

ally    

                          2018                   None                

 

                    Full Exam - General                   Ears/Nose/Throat      

             

otoscopic exam                   Overall:                   external auditory 

canals clear                   2018                   None                

 

                    Full Exam - General                   Ears/Nose/Throat      

             

otoscopic exam                   Overall:                   tympanic membranes 

clear                     2018                   None                

 

                    Full Exam - General                   Ears/Nose/Throat      

             oral 

cavity/pharynx/larynx                    Oropharynx:                   a normal 

exam                      2018                   None                

 

                    Full Exam - General                   Cardiovascular        

           

inspection of carotid pulses                   Overall:                   

strong, bilaterally equal, no bruits                   2018               

                                        None                

 

                    Full Exam - General                   Cardiovascular        

           palpation

of heart                   Overall:                   apical impulse location 

normal                    2018                   None                

 

                    Full Exam - General                   Cardiovascular        

           palpation

of heart                   Overall:                   no heave/lift             

                          2018                   None                

 

                    Full Exam - General                   Lymphatic             

      neck nodes    

                          Overall:                   anterior cervical chain aruna

ign         

                          2018                   None                

 

                    Full Exam - General                   Lymphatic             

      neck nodes    

                          Overall:                   posterior cervical chain be

nign        

                          2018                   None                

 

                    Full Exam - General                   Neurologic            

       deep tendon 

reflexes                   Overall:                   deep tendon reflexes 

intact                    2018                   None                

 

                    Full Exam - General                   Neurologic            

       mental status

                    Overall:                   alert                   

8 

                                        None                

 

                    Full Exam - General                   Neurologic            

       mental status

                    Overall:                   oriented                   

2018                              None                

 

                    Full Exam - General                   Neurologic            

       cranial 

nerves                    Overall:                   cranial nerves 1-12 intact 

 

                          2018                   None                

 

                    Full Exam - General                   Psychiatric           

        

orientation/consciousness                   Overall:                   oriented 

to person, place and time                   2018                   None   

            

 

                    Full Exam - General                   Psychiatric           

        

behavior/psychomotor activity                   Overall:                   no 

tics, normal psychomotor activity                   2018                  

                                        None                

 

                    Full Exam - General                   Psychiatric           

        mood and 

affect                    Overall:                   normal mood and affect     

 

                          2018                   None                

 

                    Full Exam - General                   Psychiatric           

        appearance  

                          Overall:                   well-groomed, good eye cont

act       

                          2018                   None                

 

                    Full Exam - General                   Psychiatric           

        speech      

                          Overall:                   normal quality, no aphasia 

              

                          2018                   None                

 

                    Full Exam - General                   Psychiatric           

        speech      

                          Overall:                   normal quality, quantity, r

ate           

                          2018                   None                

 

                    Full Exam - General                   Psychiatric           

        attention   

                          Overall:                   normal digit recall and num

dbeo 

repeating                   2018                   None                

 

                    Full Exam - General                   Constitutional        

            general 

appearance                   Overall:                   well nourished          

                          2018                   None                

 

                    Full Exam - General                   Constitutional        

            general 

appearance                   Overall:                   well developed          

                          2018                   None                

 

                    Full Exam - General                   Constitutional        

            general 

appearance                   Overall:                   in no acute distress    

                          2018                   None                

 

                    Full Exam - General                   Musculoskeletal       

            head and

neck                      Cervical Spine:                   a normal exam       

   

                          2018                   None                

 

                    Full Exam - General                   Musculoskeletal       

            head and

neck                   Head:                   atraumatic                   

2018                              None                

 

                    Full Exam - General                   Musculoskeletal       

            head and

neck                   Head:                   normocephalic                   

2018                              None                

 

                    Full Exam - General                   Musculoskeletal       

            head and

neck                      Overall:                   head atraumatic            

   

                          2018                   None                

 

                    Full Exam - General                   Musculoskeletal       

            head and

neck                      Overall:                   cervical spine benign      

   

                          2018                   None                

 

                    Full Exam - General                   Neurologic            

       mental status

                    Overall:                   alert                   

8 

                                        None                

 

                    Full Exam - General                   Neurologic            

       mental status

                    Overall:                   oriented                   

2018                              None                

 

                    Full Exam - General                   Psychiatric           

        mood and 

affect                    Overall:                   normal mood and affect     

 

                          2018                   None                

 

                    Full Exam - General                   Constitutional        

            general 

appearance                   Overall:                   well nourished          

                          2018                   None                

 

                    Full Exam - General                   Constitutional        

            general 

appearance                   Overall:                   well developed          

                          2018                   None                

 

                    Full Exam - General                   Constitutional        

            general 

appearance                   Overall:                   in no acute distress    

                          2018                   None                

 

                    Full Exam - General                   Neurologic            

       mental status

                    Overall:                   alert                   

8 

                                        None                

 

                    Full Exam - General                   Neurologic            

       mental status

                    Overall:                   oriented                   

2018                              None                

 

                    Full Exam - General                   Psychiatric           

        mood and 

affect                    Overall:                   normal mood and affect     

 

                          2018                   None                

 

                    Full Exam - General                   Respiratory           

        auscultation

                          Overall:                   breath sounds clear bilater

ally    

                          2018                   None                

 

                    Full Exam - General                   Cardiovascular        

           

auscultation of heart                   Overall:                   regular rate 

                          2018                   None                

 

                    Full Exam - General                   Cardiovascular        

           

auscultation of heart                   Overall:                   normal heart 

sounds                    2018                   None                

 

                    Full Exam - General                   Cardiovascular        

           

auscultation of heart                   S4 (atrial gallop):                   

present                   2018                   None                

 

                    Full Exam - General                   Cardiovascular        

           

auscultation of heart                   Murmur:                   previously 

known murmur unchanged                   2018                   None      

         

 

                    Full Exam - General                   Musculoskeletal       

            spine, 

ribs and pelvis                   Spine:                    tender @ cervical 

spine                     2018                   None                

 

                    Full Exam - General                   Constitutional        

            general 

appearance                   Overall:                   well nourished          

                          2018                   None                

 

                    Full Exam - General                   Constitutional        

            general 

appearance                   Overall:                   in no acute distress    

                          2018                   None                

 

                    Full Exam - General                   Cardiovascular        

           

auscultation of heart                   Overall:                   regular rate 

                          2018                   None                

 

                    Full Exam - General                   Respiratory           

        percussion  

                    Overall:                   benign percussion                

   

2018                              None                

 

                    Full Exam - General                   Respiratory           

        respiratory 

effort/rhythm                   Overall:                   no retractions       

                          2018                   None                

 

                    Full Exam - General                   Respiratory           

        respiratory 

effort/rhythm                   Overall:                   normal rate          

                          2018                   None                

 

                    Full Exam - General                   Lymphatic             

      other nodes   

                          Right auricular chain:                   tender       

           

                          2018                   None                

 

                    Full Exam - General                   Lymphatic             

      other nodes   

                    Left supraclavicular:                   tender              

     

2018                              None                

 

                    Full Exam - General                   Ears/Nose/Throat      

             

otoscopic exam                   Left tympanic membrane:                   air-

fluid level                   2018                   None                

 

                    Full Exam - General                   Ears/Nose/Throat      

             

otoscopic exam                   Right tympanic membrane:                   air-

fluid level                   2018                   None                

 

                    Full Exam - General                   Neurologic            

       mental status

                    Overall:                   alert                   

8 

                                        None                

 

                    Full Exam - General                   Neurologic            

       mental status

                    Overall:                   oriented                   

2018                              None                

 

                    Full Exam - General                   Constitutional        

            general 

appearance                   Overall:                   well nourished          

                          2018                   None                

 

                    Full Exam - General                   Constitutional        

            general 

appearance                   Overall:                   well developed          

                          2018                   None                

 

                    Full Exam - General                   Constitutional        

            general 

appearance                   Overall:                   in no acute distress    

                          2018                   None                

 

                    Full Exam - General                   Neurologic            

       mental status

                    Overall:                   alert                   

8 

                                        None                

 

                    Full Exam - General                   Neurologic            

       mental status

                    Overall:                   oriented                   

2018                              None                

 

                    Full Exam - General                   Psychiatric           

        mood and 

affect                    Overall:                   normal mood and affect     

 

                          2018                   None                

 

                    Full Exam - General                   Musculoskeletal       

            spine, 

ribs and pelvis                   Spine:                    tender @ lumbar spin

e

                          2018                   None                

 

                    Full Exam - General                   Abdomen               

    abdominal exam  

                    Overall:                   no masses                   

2018                              None                

 

                    Full Exam - General                   Abdomen               

    abdominal exam  

                          Overall:                   normal bowel sounds        

          

                          2018                   None                

 

                    Full Exam - General                   Abdomen               

    abdominal exam  

                    Overall:                   soft                   2018

    

                                        None                

 

                    Full Exam - General                   Abdomen               

    abdominal exam  

                          Left lower quadrant:                   tender to palpa

tion      

                          2018                   None                

 

                    Full Exam - General                   Constitutional        

            general 

appearance                   Overall:                   well nourished          

                          2017                   None                

 

                    Full Exam - General                   Constitutional        

            general 

appearance                   Overall:                   well developed          

                          2017                   None                

 

                    Full Exam - General                   Constitutional        

            general 

appearance                   Overall:                   in no acute distress    

                          2017                   None                

 

                    Full Exam - General                   Neurologic            

       mental status

                    Overall:                   alert                   

7 

                                        None                

 

                    Full Exam - General                   Neurologic            

       mental status

                    Overall:                   oriented                   

2017                              None                

 

                    Full Exam - General                   Psychiatric           

        mood and 

affect                    Overall:                   normal mood and affect     

 

                          2017                   None                

 

                    Full Exam - General                   Musculoskeletal       

            gait and

station                   Station:                   kyphosis                   

2017                              None                

 

                    Full Exam - General                   Constitutional        

            general 

appearance                   Overall:                   well nourished          

                          2017                   None                

 

                    Full Exam - General                   Constitutional        

            general 

appearance                   Overall:                   well developed          

                          2017                   None                

 

                    Full Exam - General                   Constitutional        

            general 

appearance                   Overall:                   in no acute distress    

                          2017                   None                

 

                    Full Exam - General                   Constitutional        

            general 

appearance                   Assistive Device:                   cane           

                          2017                   None                

 

                    Full Exam - General                   Neurologic            

       mental status

                    Overall:                   alert                   

7 

                                        None                

 

                    Full Exam - General                   Neurologic            

       mental status

                    Overall:                   oriented                   

2017                              None                

 

                    Full Exam - General                   Psychiatric           

        mood and 

affect                    Overall:                   normal mood and affect     

 

                          2017                   None                

 

                    Full Exam - General                   Abdomen               

    abdominal exam  

                    Overall:                   no masses                   

2017                              None                

 

                    Full Exam - General                   Abdomen               

    abdominal exam  

                          Overall:                   normal bowel sounds        

          

                          2017                   None                

 

                    Full Exam - General                   Abdomen               

    abdominal exam  

                    Overall:                   soft                   2017

    

                                        None                

 

                    Full Exam - General                   Abdomen               

    abdominal exam  

                          Left lower quadrant:                   tender to palpa

tion      

                          2017                   None                

 

                    Full Exam - General                   Abdomen               

    abdominal exam  

                          Right lower quadrant:                   tender to palp

ation     

                          2017                   None                

 

                    Full Exam - General                   Musculoskeletal       

            spine, 

ribs and pelvis                   Spine:                    tender @ lumbar spin

e

                          2017                   left                 

 

                    Full Exam - General                   Respiratory           

        auscultation

                          Overall:                   breath sounds clear bilater

ally    

                          2017                   None                

 

                    Full Exam - General                   Cardiovascular        

           

auscultation of heart                   Overall:                   regular rate 

                          2017                   None                

 

                    Full Exam - General                   Cardiovascular        

           

auscultation of heart                   Overall:                   normal heart 

sounds                    2017                   None                

 

                    Full Exam - General                   Cardiovascular        

           

auscultation of heart                   S4 (atrial gallop):                   

present                   2017                   None                

 

                    Full Exam - General                   Constitutional        

            general 

appearance                   Overall:                   well nourished          

                          2017                   None                

 

                    Full Exam - General                   Constitutional        

            general 

appearance                   Overall:                   well developed          

                          2017                   None                

 

                    Full Exam - General                   Constitutional        

            general 

appearance                   Overall:                   in no acute distress    

                          2017                   None                

 

                    Full Exam - General                   Neurologic            

       mental status

                    Overall:                   alert                   

7 

                                        None                

 

                    Full Exam - General                   Neurologic            

       mental status

                    Overall:                   oriented                   

2017                              None                

 

                    Full Exam - General                   Psychiatric           

        mood and 

affect                    Overall:                   normal mood and affect     

 

                          2017                   None                

 

                    Full Exam - General                   Respiratory           

        auscultation

                          Overall:                   breath sounds clear bilater

ally    

                          2017                   None                

 

                    Full Exam - General                   Cardiovascular        

           

auscultation of heart                   Overall:                   regular rate 

                          2017                   None                

 

                    Full Exam - General                   Cardiovascular        

           

auscultation of heart                   Overall:                   normal heart 

sounds                    2017                   None                

 

                    Full Exam - General                   Cardiovascular        

           

auscultation of heart                   S4 (atrial gallop):                   

present                   2017                   None                

 

                    Full Exam - General                   Cardiovascular        

           

auscultation of heart                   Murmur:                   previously 

known murmur unchanged                   2017                   None      

         

 

                    Full Exam - General                   Cardiovascular        

           

extremities                   Overall:                   no clubbing            

                          2017                   None                

 

                    Full Exam - General                   Cardiovascular        

           

extremities                   Overall:                   No cyanosis            

                          2017                   None                

 

                    Full Exam - General                   Cardiovascular        

           

extremities                   Edema present:                   non-pitting      

                          2017                   None                

 

                    Full Exam - General                   Neck                  

 inspection of neck 

                    Overall:                   normal size                   

2017                              None                

 

                    Full Exam - General                   Neck                  

 inspection of neck 

                    Overall:                   no masses                   

2017                              None                

 

                    Full Exam - General                   Chest/Breast          

         breast and 

axillae palpation                   Overall:                   no masses        

                          2017                   None                

 

                    Full Exam - General                   Chest/Breast          

         breast and 

axillae palpation                   Overall:                   axillae non-

tender                    2017                   None                

 

                    Full Exam - General                   Chest/Breast          

         breast and 

axillae palpation                   Overall:                   no nipple 

discharge                   2017                   None                

 

                    Full Exam - General                   Chest/Breast          

         breast and 

axillae palpation                       Left upper inner quadrant:              

    

                    tender                   2017                   None  

              

 

                    Full Exam - General                   Chest/Breast          

         breast and 

axillae palpation                       Left upper outer quadrant:              

    

                    tender                   2017                   None  

              

 

                    Full Exam - General                   Chest/Breast          

         breast and 

axillae palpation                       Left lower inner quadrant:              

    

                    tender                   2017                   None  

              

 

                    Full Exam - General                   Chest/Breast          

         breast and 

axillae palpation                       Left lower outer quadrant:              

    

                    tender                   2017                   None  

              

 

                    Full Exam - General                   Chest/Breast          

         breast and 

axillae palpation                       Right upper inner quadrant:             

    

                    tender                   2017                   None  

              

 

                    Full Exam - General                   Chest/Breast          

         breast and 

axillae palpation                       Right upper outer quadrant:             

    

                    tender                   2017                   None  

              

 

                    Full Exam - General                   Chest/Breast          

         breast and 

axillae palpation                       Right lower inner quadrant:             

    

                    tender                   2017                   None  

              

 

                    Full Exam - General                   Chest/Breast          

         breast and 

axillae palpation                       Right lower outer quadrant:             

    

                    tender                   2017                   None  

              

 

                    Full Exam - General                   Musculoskeletal       

            gait and

station                   Station:                   kyphosis                   

2017                              None                

 

                    Full Exam - General                   Musculoskeletal       

            spine, 

ribs and pelvis                   Chest wall palpation:                   chest 

wall pain                   2017                   bilateral costochondral

junction                

 

                    Full Exam - General                   Constitutional        

            general 

appearance                   Overall:                   well nourished          

                          2017                   None                

 

                    Full Exam - General                   Constitutional        

            general 

appearance                   Overall:                   well developed          

                          2017                   None                

 

                    Full Exam - General                   Constitutional        

            general 

appearance                   Overall:                   in no acute distress    

                          2017                   None                

 

                    Full Exam - General                   Constitutional        

            general 

appearance                   Nourishment:                   well nourished      

                          2017                   None                

 

                    Full Exam - General                   Constitutional        

            general 

appearance                   Evidence of Distress:                   in no acute

distress                   2017                   None                

 

                    Full Exam - General                   Eyes                  

 conjunctiva/eyelids

                          Overall:                   conjunctiva clear          

        

                          2017                   None                

 

                    Full Exam - General                   Eyes                  

 conjunctiva/eyelids

                    Overall:                   cornea clear                   

2017                              None                

 

                    Full Exam - General                   Eyes                  

 conjunctiva/eyelids

                    Overall:                   eyelids normal                   

2017                              None                

 

                    Full Exam - General                   Eyes                  

 pupils and irises  

                          Overall:                   pupils equal, round, reacti

ve to 

light and accomodation                   2017                   None      

         

 

                    Full Exam - General                   Ears/Nose/Throat      

             

external ear                   Overall:                   normal appearance     

                          2017                   None                

 

                    Full Exam - General                   Ears/Nose/Throat      

             

external nose                   Overall:                   benign appearance    

                          2017                   None                

 

                    Full Exam - General                   Ears/Nose/Throat      

             

otoscopic exam                   Overall:                   external auditory 

canals clear                   2017                   None                

 

                    Full Exam - General                   Ears/Nose/Throat      

             

otoscopic exam                   Left tympanic membrane:                   air-

fluid level                   2017                   None                

 

                    Full Exam - General                   Ears/Nose/Throat      

             

otoscopic exam                   Right tympanic membrane:                   air-

fluid level                   2017                   None                

 

                    Full Exam - General                   Ears/Nose/Throat      

             

internal nose                   Left nasal cavity:                   mucosal 

edema                     2017                   None                

 

                    Full Exam - General                   Ears/Nose/Throat      

             

internal nose                   Right nasal cavity:                   mucosal 

edema                     2017                   None                

 

                    Full Exam - General                   Ears/Nose/Throat      

             

lips/teeth/gingiva                   Overall:                   benign lips     

                          2017                   None                

 

                    Full Exam - General                   Ears/Nose/Throat      

             

lips/teeth/gingiva                   Overall:                   normal dentition

                          2017                   None                

 

                    Full Exam - General                   Ears/Nose/Throat      

             oral 

cavity/pharynx/larynx                    Oropharynx:                   a normal 

exam                      2017                   None                

 

                    Full Exam - General                   Respiratory           

        auscultation

                          Overall:                   breath sounds clear bilater

ally    

                          2017                   None                

 

                    Full Exam - General                   Cardiovascular        

           

auscultation of heart                   Overall:                   regular rate 

                          2017                   None                

 

                    Full Exam - General                   Cardiovascular        

           

auscultation of heart                   Overall:                   normal heart 

sounds                    2017                   None                

 

                    Full Exam - General                   Cardiovascular        

           

auscultation of heart                   Overall:                   no murmurs   

                          2017                   None                

 

                    Full Exam - General                   Integument            

       inspection of

skin                      Overall:                   no rash, lesions           

   

                          2017                   None                

 

                    Full Exam - General                   Psychiatric           

        mood and 

affect                    Overall:                   normal mood and affect     

 

                          2017                   None                

 

                    Full Exam - General                   Ears/Nose/Throat      

             

otoscopic exam                   Otorrhea:                   absent             

                          2017                   None                

 

                    Full Exam - General                   Ears/Nose/Throat      

             

otoscopic exam                   Perforation:                   absent          

                          2017                   None                

 

                    Full Exam - General                   Ears/Nose/Throat      

             

internal nose                   Overall:                   no sinus tenderness  

                          2017                   None                

 

                    Full Exam - General                   Ears/Nose/Throat      

             

internal nose                   Overall:                   no drainage          

                          2017                   None                

 

                    Full Exam - General                   Ears/Nose/Throat      

             oral 

cavity/pharynx/larynx                    Overall:                   oral mucosa 

clear                     2017                   None                

 

                    Full Exam - General                   Respiratory           

        respiratory 

effort/rhythm                   Overall:                   no retractions       

                          2017                   None                

 

                    Full Exam - General                   Respiratory           

        respiratory 

effort/rhythm                   Overall:                   normal rate          

                          2017                   None                

 

                    Full Exam - General                   Lymphatic             

      neck nodes    

                          Overall:                   shotty lymphadenopathy     

            

                          2017                   tender but no enlargement

 palpable                

 

                    Full Exam - General                   Constitutional        

            general 

appearance                   Overall:                   well nourished          

                          2016                   None                

 

                    Full Exam - General                   Constitutional        

            general 

appearance                   Overall:                   well developed          

                          2016                   None                

 

                    Full Exam - General                   Constitutional        

            general 

appearance                   Overall:                   in no acute distress    

                          2016                   None                

 

                    Full Exam - General                   Ears/Nose/Throat      

             

otoscopic exam                   Overall:                   external auditory 

canals clear                   2016                   None                

 

                    Full Exam - General                   Ears/Nose/Throat      

             

internal nose                   Drainage:                   clear               

                          2016                   None                

 

                    Full Exam - General                   Ears/Nose/Throat      

             oral 

cavity/pharynx/larynx                    Oropharynx:                   postnasal

drainage                   2016                   None                

 

                    Full Exam - General                   Respiratory           

        auscultation

                          Overall:                   breath sounds clear bilater

ally    

                          2016                   None                

 

                    Full Exam - General                   Cardiovascular        

           

auscultation of heart                   Overall:                   regular rate 

                          2016                   None                

 

                    Full Exam - General                   Cardiovascular        

           

auscultation of heart                   Overall:                   normal heart 

sounds                    2016                   None                

 

                    Full Exam - General                   Cardiovascular        

           

auscultation of heart                   Overall:                   no murmurs   

                          2016                   None                

 

                    Full Exam - General                   Neurologic            

       mental status

                    Overall:                   alert                   

6 

                                        None                

 

                    Full Exam - General                   Neurologic            

       mental status

                    Overall:                   oriented                   

2016                              None                

 

                    Full Exam - General                   Psychiatric           

        mood and 

affect                    Overall:                   normal mood and affect     

 

                          2016                   None                

 

                    Full Exam - General                   Constitutional        

            general 

appearance                   Overall:                   well nourished          

                          2016                   None                

 

                    Full Exam - General                   Constitutional        

            general 

appearance                   Overall:                   well developed          

                          2016                   None                

 

                    Full Exam - General                   Constitutional        

            general 

appearance                   Overall:                   in no acute distress    

                          2016                   None                

 

                    Full Exam - General                   Neurologic            

       mental status

                    Overall:                   alert                   

6 

                                        None                

 

                    Full Exam - General                   Neurologic            

       mental status

                    Overall:                   oriented                   

2016                              None                

 

                    Full Exam - General                   Psychiatric           

        mood and 

affect                    Overall:                   normal mood and affect     

 

                          2016                   None                

 

                    Full Exam - General                   Abdomen               

    abdominal exam  

                    Overall:                   no masses                   

2016                              None                

 

                    Full Exam - General                   Abdomen               

    abdominal exam  

                          Overall:                   normal bowel sounds        

          

                          2016                   None                

 

                    Full Exam - General                   Abdomen               

    abdominal exam  

                    Overall:                   soft                   2016

    

                                        None                

 

                    Full Exam - General                   Abdomen               

    abdominal exam  

                    Overall:                   no tenderness                   

2016                              None                

 

                    Full Exam - General                   Constitutional        

            general 

appearance                   Overall:                   well nourished          

                          2016                   None                

 

                    Full Exam - General                   Constitutional        

            general 

appearance                   Overall:                   well developed          

                          2016                   None                

 

                    Full Exam - General                   Constitutional        

            general 

appearance                   Overall:                   in no acute distress    

                          2016                   None                

 

                    Full Exam - General                   Neurologic            

       mental status

                    Overall:                   alert                   

6 

                                        None                

 

                    Full Exam - General                   Neurologic            

       mental status

                    Overall:                   oriented                   

2016                              None                

 

                    Full Exam - General                   Psychiatric           

        mood and 

affect                    Overall:                   normal mood and affect     

 

                          2016                   None                

 

                    Full Exam - General                   Respiratory           

        auscultation

                          Overall:                   breath sounds clear bilater

ally    

                          2016                   None                

 

                    Full Exam - General                   Cardiovascular        

           

auscultation of heart                   Overall:                   regular rate 

                          2016                   None                

 

                    Full Exam - General                   Cardiovascular        

           

auscultation of heart                   Overall:                   normal heart 

sounds                    2016                   None                

 

                    Full Exam - General                   Cardiovascular        

           

auscultation of heart                   S3 (ventricular gallop):                

                    present                   2016                   None 

               

 

                    Full Exam - General                   Cardiovascular        

           

auscultation of heart                   Murmur:                   previously 

known murmur unchanged                   2016                   None      

         

 

                    Full Exam - General                   Cardiovascular        

           

extremities                   Overall:                   no clubbing            

                          2016                   None                

 

                    Full Exam - General                   Cardiovascular        

           

extremities                   Overall:                   No cyanosis            

                          2016                   None                

 

                    Full Exam - General                   Cardiovascular        

           

extremities                   Overall:                   No edema               

                          2016                   None                

 

                    Full Exam - General                   Constitutional        

            general 

appearance                   Overall:                   well nourished          

                          2015                   None                

 

                    Full Exam - General                   Constitutional        

            general 

appearance                   Overall:                   well developed          

                          2015                   None                

 

                    Full Exam - General                   Constitutional        

            general 

appearance                   Overall:                   in no acute distress    

                          2015                   None                

 

                    Full Exam - General                   Ears/Nose/Throat      

             

otoscopic exam                   Overall:                   external auditory 

canals clear                   2015                   None                

 

                    Full Exam - General                   Ears/Nose/Throat      

             

internal nose                   Drainage:                   clear               

                          2015                   None                

 

                    Full Exam - General                   Ears/Nose/Throat      

             oral 

cavity/pharynx/larynx                    Oropharynx:                   postnasal

drainage                   2015                   None                

 

                    Full Exam - General                   Respiratory           

        auscultation

                          Overall:                   breath sounds clear bilater

ally    

                          2015                   None                

 

                    Full Exam - General                   Cardiovascular        

           

auscultation of heart                   Overall:                   regular rate 

                          2015                   None                

 

                    Full Exam - General                   Cardiovascular        

           

auscultation of heart                   Overall:                   normal heart 

sounds                    2015                   None                

 

                    Full Exam - General                   Cardiovascular        

           

auscultation of heart                   Overall:                   no murmurs   

                          2015                   None                

 

                    Full Exam - General                   Neurologic            

       mental status

                    Overall:                   alert                   

5 

                                        None                

 

                    Full Exam - General                   Neurologic            

       mental status

                    Overall:                   oriented                   

2015                              None                

 

                    Full Exam - General                   Psychiatric           

        mood and 

affect                    Overall:                   normal mood and affect     

 

                          2015                   None                

 

                    Full Exam - General                   Abdomen               

    abdominal exam  

                    Overall:                   no masses                   

2015                              None                

 

                    Full Exam - General                   Abdomen               

    abdominal exam  

                    Overall:                   no tenderness                   

2015                              None                

 

                    Full Exam - General                   Abdomen               

    abdominal exam  

                          Overall:                   normal bowel sounds        

          

                          2015                   None                

 

                    Full Exam - General                   Abdomen               

    abdominal exam  

                    Overall:                   soft                   2015

    

                                        None                

 

                    Full Exam - General                   Musculoskeletal       

            gait and

station                   Overall:                   normal gait                

                          2015                   None                

 

                    Full Exam - General                   Musculoskeletal       

            gait and

station                   Station:                   kyphosis                   

2015                              None                

 

                    Full Exam - General                   Musculoskeletal       

            spine, 

ribs and pelvis                   Spine:                    tender @ thoracic 

spine                     2015                   None                

 

                    Full Exam - General                   Musculoskeletal       

            spine, 

ribs and pelvis                   Spine:                    tender @ lumbar spin

e

                          2015                   None                

 

                    Full Exam - General                   Ears/Nose/Throat      

             

otoscopic exam                   Left tympanic membrane:                   a 

normal exam                   2015                   None                

 

                    Full Exam - General                   Ears/Nose/Throat      

             

otoscopic exam                   Right tympanic membrane:                   a 

normal exam                   2015                   None                

 

                    Full Exam - General                   Constitutional        

            general 

appearance                   Overall:                   well nourished          

                          2015                   None                

 

                    Full Exam - General                   Constitutional        

            general 

appearance                   Overall:                   well developed          

                          2015                   None                

 

                    Full Exam - General                   Constitutional        

            general 

appearance                   Evidence of Distress:                   tearful    

                          2015                   None                

 

                    Full Exam - General                   Neurologic            

       mental status

                    Overall:                   alert                   

5 

                                        None                

 

                    Full Exam - General                   Neurologic            

       mental status

                    Overall:                   oriented                   

2015                              None                

 

                    Full Exam - General                   Psychiatric           

        mood and 

affect                   Mood:                   depressed                   

2015                              tearful                

 

                    Full Exam - General                   Respiratory           

        auscultation

                          Overall:                   breath sounds clear bilater

ally    

                          2015                   None                

 

                    Full Exam - General                   Cardiovascular        

           

auscultation of heart                   Overall:                   regular rate 

                          2015                   None                

 

                    Full Exam - General                   Cardiovascular        

           

auscultation of heart                   Overall:                   normal heart 

sounds                    2015                   None                

 

                    Full Exam - General                   Cardiovascular        

           

auscultation of heart                   S3 (ventricular gallop):                

                    present                   2015                   None 

               

 

                    Full Exam - General                   Cardiovascular        

           

auscultation of heart                   Murmur:                   previously 

known murmur unchanged                   2015                   None      

         

 

                    Full Exam - General                   Cardiovascular        

           

extremities                   Overall:                   no clubbing            

                          2015                   None                

 

                    Full Exam - General                   Cardiovascular        

           

extremities                   Overall:                   No edema               

                          2015                   None                

 

                    Full Exam - General                   Cardiovascular        

           

extremities                   Overall:                   No cyanosis            

                          2015                   None                

 

                    Full Exam - General                   Constitutional        

            general 

appearance                   Overall:                   well nourished          

                          2015                   None                

 

                    Full Exam - General                   Constitutional        

            general 

appearance                   Overall:                   well developed          

                          2015                   None                

 

                    Full Exam - General                   Constitutional        

            general 

appearance                   Overall:                   in no acute distress    

                          2015                   None                

 

                    Full Exam - General                   Ears/Nose/Throat      

             oral 

cavity/pharynx/larynx                    Overall:                   oral mucosa 

clear                     2015                   None                

 

                    Full Exam - General                   Ears/Nose/Throat      

             

otoscopic exam                          Left external auditory canal:           

       

                    complete cerumen impaction                   2015     

              None  

             

 

                    Full Exam - General                   Ears/Nose/Throat      

             

otoscopic exam                          Right external auditory canal:          

       

                    complete cerumen impaction                   2015     

              None 

              

 

                    Full Exam - General                   Ears/Nose/Throat      

             

otoscopic exam                   Right tympanic membrane:                   

abnormal light reflex                   2015                   None       

        

 

                    Full Exam - General                   Ears/Nose/Throat      

             

otoscopic exam                   Right tympanic membrane:                   air-

fluid level                   2015                   secondary to cerumen 

removal                 

 

                    Full Exam - General                   Respiratory           

        auscultation

                          Overall:                   breath sounds clear bilater

ally    

                          2015                   None                

 

                    Full Exam - General                   Neurologic            

       mental status

                    Overall:                   alert                   

5 

                                        None                

 

                    Full Exam - General                   Neurologic            

       mental status

                    Overall:                   oriented                   

2015                              None                

 

                    Full Exam - General                   Psychiatric           

        mood and 

affect                    Overall:                   normal mood and affect     

 

                          2015                   None                

 

                    Full Exam - General                   Ears/Nose/Throat      

             

otoscopic exam                   Left tympanic membrane:                   

abnormal light reflex                   2015                   None       

        

 

                    Full Exam - General                   Cardiovascular        

           

auscultation of heart                   S3 (ventricular gallop):                

                    present                   2015                   None 

               

 

                    Full Exam - General                   Cardiovascular        

           

auscultation of heart                   Overall:                   regular rate 

                          2015                   None                

 

                    Full Exam - General                   Constitutional        

            general 

appearance                   Overall:                   well nourished          

                          2015                   None                

 

                    Full Exam - General                   Constitutional        

            general 

appearance                   Overall:                   well developed          

                          2015                   None                

 

                    Full Exam - General                   Constitutional        

            general 

appearance                   Overall:                   in no acute distress    

                          2015                   None                

 

                    Full Exam - General                   Neurologic            

       mental status

                    Overall:                   oriented                   

2015                              None                

 

                    Full Exam - General                   Neurologic            

       mental status

                    Overall:                   alert                   

5 

                                        None                

 

                    Full Exam - General                   Psychiatric           

        mood and 

affect                    Overall:                   normal mood and affect     

 

                          2015                   None                

 

                    Full Exam - General                   Musculoskeletal       

            right 

lower extremity                         Inspection - right lower leg:           

      

                    swelling                   2015                   mini

mal                

 

                    Full Exam - General                   Musculoskeletal       

            right 

lower extremity                         Palpation - right lower leg:            

      

                    tender                   2015                   medial

ly                

 

                    Full Exam - General                   Constitutional        

            general 

appearance                   Overall:                   well nourished          

                          2015                   None                

 

                    Full Exam - General                   Constitutional        

            general 

appearance                   Overall:                   well developed          

                          2015                   None                

 

                    Full Exam - General                   Constitutional        

            general 

appearance                   Overall:                   in no acute distress    

                          2015                   None                

 

                    Full Exam - General                   Neurologic            

       mental status

                    Overall:                   alert                   

5 

                                        None                

 

                    Full Exam - General                   Neurologic            

       mental status

                    Overall:                   oriented                   

2015                              None                

 

                    Full Exam - General                   Psychiatric           

        mood and 

affect                    Overall:                   normal mood and affect     

 

                          2015                   None                

 

                    Full Exam - General                   Respiratory           

        auscultation

                          Overall:                   breath sounds clear bilater

ally    

                          2015                   None                

 

                    Full Exam - General                   Cardiovascular        

           

auscultation of heart                   Overall:                   regular rate 

                          2015                   None                

 

                    Full Exam - General                   Cardiovascular        

           

auscultation of heart                   Overall:                   normal heart 

sounds                    2015                   None                

 

                    Full Exam - General                   Cardiovascular        

           

auscultation of heart                   S3 (ventricular gallop):                

                    present                   2015                   None 

               

 

                    Full Exam - General                   Cardiovascular        

           

auscultation of heart                   Murmur:                   previously 

known murmur unchanged                   2015                   None      

         

 

                    Full Exam - General                   Musculoskeletal       

            spine, 

ribs and pelvis                   Spine:                    tender @ thoracic 

spine                     2015                   None                

 

                    Full Exam - General                   Constitutional        

            general 

appearance                   Overall:                   well nourished          

                          2015                   None                

 

                    Full Exam - General                   Constitutional        

            general 

appearance                   Overall:                   well developed          

                          2015                   None                

 

                    Full Exam - General                   Constitutional        

            general 

appearance                   Overall:                   in no acute distress    

                          2015                   None                

 

                    Full Exam - General                   Neurologic            

       mental status

                    Overall:                   alert                   

5 

                                        None                

 

                    Full Exam - General                   Neurologic            

       mental status

                    Overall:                   oriented                   

2015                              None                

 

                    Full Exam - General                   Psychiatric           

        mood and 

affect                    Overall:                   normal mood and affect     

 

                          2015                   None                

 

                    Full Exam - General                   Respiratory           

        auscultation

                          Overall:                   breath sounds clear bilater

ally    

                          2015                   None                

 

                    Full Exam - General                   Cardiovascular        

           

auscultation of heart                   Overall:                   regular rate 

                          2015                   None                

 

                    Full Exam - General                   Cardiovascular        

           

auscultation of heart                   Overall:                   normal heart 

sounds                    2015                   None                

 

                    Full Exam - General                   Cardiovascular        

           

auscultation of heart                   S3 (ventricular gallop):                

                    present                   2015                   None 

               

 

                    Full Exam - General                   Cardiovascular        

           

auscultation of heart                   Murmur:                   previously 

known murmur unchanged                   2015                   None      

         

 

                    Full Exam - General                   Cardiovascular        

           

extremities                   Overall:                   no clubbing            

                          2015                   None                

 

                    Full Exam - General                   Cardiovascular        

           

extremities                   Overall:                   No edema               

                          2015                   None                

 

                    Full Exam - General                   Cardiovascular        

           

extremities                   Overall:                   No cyanosis            

                          2015                   None                

 

                    Full Exam - General                   Abdomen               

    abdominal exam  

                    Overall:                   no masses                   

2015                              None                

 

                    Full Exam - General                   Abdomen               

    abdominal exam  

                    Overall:                   no tenderness                   

2015                              None                

 

                    Full Exam - General                   Abdomen               

    abdominal exam  

                          Overall:                   normal bowel sounds        

          

                          2015                   None                

 

                    Full Exam - General                   Abdomen               

    abdominal exam  

                    Overall:                   soft                   2015

    

                                        None                

 

                    Full Exam - General                   Integument            

       inspection of

skin                   Location:                   right arm                   

2015                              upper posterior with irregular nevus    

           



 

                    Full Exam - General                   Ears/Nose/Throat      

             

internal nose                   Turbinates:                   erythema          

                          2014                   None                

 

                    Full Exam - General                   Ears/Nose/Throat      

             oral 

cavity/pharynx/larynx                    Oropharynx:                   postnasal

drainage                   2014                   None                

 

                    Full Exam - General                   Ears/Nose/Throat      

             oral 

cavity/pharynx/larynx                    Oropharynx:                   erythema 

                          2014                   None                

 

                    Full Exam - General                   Constitutional        

            general 

appearance                   Overall:                   well nourished          

                          2014                   None                

 

                    Full Exam - General                   Constitutional        

            general 

appearance                   Overall:                   in no acute distress    

                          2014                   None                

 

                    Full Exam - General                   Respiratory           

        auscultation

                          Overall:                   breath sounds clear bilater

ally    

                          2014                   None                

 

                    Full Exam - General                   Cardiovascular        

           

auscultation of heart                   Overall:                   regular rate 

                          2014                   None                

 

                    Full Exam - General                   Cardiovascular        

           

auscultation of heart                   Overall:                   normal heart 

sounds                    2014                   None                

 

                    Full Exam - General                   Cardiovascular        

           

auscultation of heart                   Overall:                   no murmurs   

                          2014                   None                

 

                    Full Exam - General                   Psychiatric           

        

orientation/consciousness                   Overall:                   oriented 

to person, place and time                   2014                   None   

            

 

                    Full Exam - General                   Ears/Nose/Throat      

             

otoscopic exam                   Right tympanic membrane:                   

effusion                   2014                   None                

 

                    Full Exam - General                   Ears/Nose/Throat      

             

otoscopic exam                   Left tympanic membrane:                   a 

normal exam                   2014                   None                

 

                    Full Exam - General                   Ears/Nose/Throat      

             

otoscopic exam                   Right tympanic membrane:                   loss

of landmarks                   2014                   None                

 

                    Full Exam - General                   Constitutional        

            general 

appearance                   Overall:                   well nourished          

                          2014                   None                

 

                    Full Exam - General                   Constitutional        

            general 

appearance                   Overall:                   well developed          

                          2014                   None                

 

                    Full Exam - General                   Constitutional        

            general 

appearance                   Overall:                   in no acute distress    

                          2014                   None                

 

                    Full Exam - General                   Neurologic            

       mental status

                    Overall:                   alert                   

4 

                                        None                

 

                    Full Exam - General                   Neurologic            

       mental status

                    Overall:                   oriented                   

2014                              None                

 

                    Full Exam - General                   Psychiatric           

        mood and 

affect                    Overall:                   normal mood and affect     

 

                          2014                   None                

 

                    Full Exam - General                   Respiratory           

        auscultation

                          Overall:                   breath sounds clear bilater

ally    

                          2014                   None                

 

                    Full Exam - General                   Cardiovascular        

           

auscultation of heart                   Overall:                   regular rate 

                          2014                   None                

 

                    Full Exam - General                   Cardiovascular        

           

auscultation of heart                   Overall:                   normal heart 

sounds                    2014                   None                

 

                    Full Exam - General                   Cardiovascular        

           

auscultation of heart                   S3 (ventricular gallop):                

                    present                   2014                   None 

               

 

                    Full Exam - General                   Cardiovascular        

           

auscultation of heart                   Murmur:                   previously 

known murmur unchanged                   2014                   None      

         

 

                    Full Exam - General                   Cardiovascular        

           

extremities                   Overall:                   no clubbing            

                          2014                   None                

 

                    Full Exam - General                   Cardiovascular        

           

extremities                   Overall:                   No cyanosis            

                          2014                   None                

 

                    Full Exam - General                   Cardiovascular        

           

extremities                   Edema present:                   non-pitting      

                          2014                   None                

 

                    Full Exam - General                   Constitutional        

            general 

appearance                   Overall:                   well nourished          

                          2014                   None                

 

                    Full Exam - General                   Constitutional        

            general 

appearance                   Overall:                   well developed          

                          2014                   None                

 

                    Full Exam - General                   Constitutional        

            general 

appearance                   Overall:                   in no acute distress    

                          2014                   None                

 

                    Full Exam - General                   Neurologic            

       mental status

                    Overall:                   alert                   

4 

                                        None                

 

                    Full Exam - General                   Neurologic            

       mental status

                    Overall:                   oriented                   

2014                              None                

 

                    Full Exam - General                   Psychiatric           

        mood and 

affect                    Overall:                   normal mood and affect     

 

                          2014                   None                

 

                    Full Exam - General                   Ears/Nose/Throat      

             

otoscopic exam                   Overall:                   external auditory 

canals clear                   2014                   None                

 

                    Full Exam - General                   Ears/Nose/Throat      

             

otoscopic exam                   Overall:                   tympanic membranes 

clear                     2014                   None                

 

                    Full Exam - General                   Ears/Nose/Throat      

             

internal nose                   Turbinates:                   hypertrophy       

                          2014                   None                

 

                    Full Exam - General                   Ears/Nose/Throat      

             oral 

cavity/pharynx/larynx                    Overall:                   oral mucosa 

clear                     2014                   None                

 

                    Full Exam - General                   Respiratory           

        auscultation

                          Overall:                   breath sounds clear bilater

ally    

                          2014                   None                

 

                    Full Exam - General                   Cardiovascular        

           

auscultation of heart                   Overall:                   regular rate 

                          2014                   None                

 

                    Full Exam - General                   Cardiovascular        

           

auscultation of heart                   Overall:                   normal heart 

sounds                    2014                   None                

 

                    Full Exam - General                   Cardiovascular        

           

auscultation of heart                   S3 (ventricular gallop):                

                    present                   2014                   None 

               

 

                    Full Exam - General                   Cardiovascular        

           

auscultation of heart                   Murmur:                   previously 

known murmur unchanged                   2014                   None      

         

 

                    Full Exam - General                   Constitutional        

            general 

appearance                   Nourishment:                   obese               

                          2014                   None                

 

                    Full Exam - General                   Constitutional        

            general 

appearance                   Overall:                   well developed          

                          2014                   None                

 

                    Full Exam - General                   Constitutional        

            general 

appearance                   Overall:                   in no acute distress    

                          2014                   None                

 

                    Full Exam - General                   Ears/Nose/Throat      

             

external ear                   Overall:                   normal appearance     

                          2014                   None                

 

                    Full Exam - General                   Ears/Nose/Throat      

             

otoscopic exam                   Overall:                   tympanic membranes 

clear                     2014                   None                

 

                    Full Exam - General                   Ears/Nose/Throat      

             

otoscopic exam                   Overall:                   external auditory 

canals clear                   2014                   None                

 

                    Full Exam - General                   Ears/Nose/Throat      

             

otoscopic exam                          Right external auditory canal:          

       

                    partial cerumen occlusion                   2014      

             None  

             

 

                    Full Exam - General                   Ears/Nose/Throat      

             

otoscopic exam                   Right tympanic membrane:                   not 

visualized                   2014                   None                

 

                    Full Exam - General                   Ears/Nose/Throat      

             

otoscopic exam                   Left tympanic membrane:                   a 

normal exam                   2014                   None                

 

                    Full Exam - General                   Respiratory           

        auscultation

                          Overall:                   breath sounds clear bilater

ally    

                          2014                   None                

 

                    Full Exam - General                   Cardiovascular        

           

auscultation of heart                   Overall:                   normal heart 

sounds                    2014                   None                

 

                    Full Exam - General                   Cardiovascular        

           

auscultation of heart                   Overall:                   regular rate 

                          2014                   None                

 

                    Full Exam - General                   Psychiatric           

        

orientation/consciousness                   Overall:                   oriented 

to person, place and time                   2014                   None   

            

 

                    Full Exam - General                   Respiratory           

        auscultation

                          Overall:                   breath sounds clear bilater

ally    

                          10/15/2013                   None                

 

                    Full Exam - General                   Constitutional        

            general 

appearance                   Overall:                   well developed          

                          10/15/2013                   None                

 

                    Full Exam - General                   Constitutional        

            general 

appearance                   Overall:                   in no acute distress    

                          10/15/2013                   None                

 

                    Full Exam - General                   Constitutional        

            general 

appearance                   Nourishment:                   obese               

                          10/15/2013                   None                

 

                    Full Exam - General                   Ears/Nose/Throat      

             

otoscopic exam                   Left tympanic membrane:                   not 

visualized                   10/15/2013                   due to cerumen        

       

 

                    Full Exam - General                   Ears/Nose/Throat      

             

otoscopic exam                   Right tympanic membrane:                   

abnormal light reflex                   10/15/2013                   None       

        

 

                    Full Exam - General                   Cardiovascular        

           

auscultation of heart                   Overall:                   regular rate 

                          10/15/2013                   None                

 

                    Full Exam - General                   Cardiovascular        

           

auscultation of heart                   Overall:                   normal heart 

sounds                    10/15/2013                   None                

 

                    Full Exam - General                   Psychiatric           

        

orientation/consciousness                   Overall:                   oriented 

to person, place and time                   10/15/2013                   None   

            

 

                    Full Exam - General                   Constitutional        

            general 

appearance                   Overall:                   well nourished          

                          2013                   None                

 

                    Full Exam - General                   Constitutional        

            general 

appearance                   Overall:                   well developed          

                          2013                   None                

 

                    Full Exam - General                   Constitutional        

            general 

appearance                   Overall:                   in no acute distress    

                          2013                   None                

 

                    Full Exam - General                   Neurologic            

       mental status

                    Overall:                   alert                   

3 

                                        None                

 

                    Full Exam - General                   Neurologic            

       mental status

                    Overall:                   oriented                   

2013                              None                

 

                    Full Exam - General                   Psychiatric           

        mood and 

affect                    Overall:                   normal mood and affect     

 

                          2013                   None                

 

                    Full Exam - General                   Respiratory           

        auscultation

                          Overall:                   breath sounds clear bilater

ally    

                          2013                   None                

 

                    Full Exam - General                   Cardiovascular        

           

auscultation of heart                   Overall:                   regular rate 

                          2013                   None                

 

                    Full Exam - General                   Cardiovascular        

           

auscultation of heart                   Overall:                   normal heart 

sounds                    2013                   None                

 

                    Full Exam - General                   Cardiovascular        

           

auscultation of heart                   S3 (ventricular gallop):                

                    present                   2013                   None 

               

 

                    Full Exam - General                   Cardiovascular        

           

extremities                   Overall:                   no clubbing            

                          2013                   None                

 

                    Full Exam - General                   Cardiovascular        

           

extremities                   Overall:                   No cyanosis            

                          2013                   None                

 

                    Full Exam - General                   Cardiovascular        

           

auscultation of heart                   Murmur:                   previously 

known murmur unchanged                   2013                   None      

         

 

                    Full Exam - General                   Constitutional        

            general 

appearance                   Overall:                   well nourished          

                          2013                   None                

 

                    Full Exam - General                   Constitutional        

            general 

appearance                   Overall:                   well developed          

                          2013                   None                

 

                    Full Exam - General                   Constitutional        

            general 

appearance                   Overall:                   in no acute distress    

                          2013                   None                

 

                    Full Exam - General                   Neurologic            

       mental status

                    Overall:                   alert                   

3 

                                        None                

 

                    Full Exam - General                   Neurologic            

       mental status

                    Overall:                   oriented                   

2013                              None                

 

                    Full Exam - General                   Psychiatric           

        mood and 

affect                    Overall:                   normal mood and affect     

 

                          2013                   None                

 

                    Full Exam - General                   Ears/Nose/Throat      

             

otoscopic exam                   Overall:                   external auditory 

canals clear                   2013                   None                

 

                    Full Exam - General                   Ears/Nose/Throat      

             

otoscopic exam                   Left tympanic membrane:                   air-

fluid level                   2013                   None                

 

                    Full Exam - General                   Ears/Nose/Throat      

             

otoscopic exam                   Right tympanic membrane:                   air-

fluid level                   2013                   None                

 

                    Full Exam - General                   Ears/Nose/Throat      

             

internal nose                   Turbinates:                   hypertrophy       

                          2013                   None                

 

                    Full Exam - General                   Ears/Nose/Throat      

             

internal nose                   Turbinates:                   erythema          

                          2013                   None                

 

                    Full Exam - General                   Ears/Nose/Throat      

             oral 

cavity/pharynx/larynx                    Oropharynx:                   postnasal

drainage                   2013                   None                

 

                    Full Exam - General                   Respiratory           

        auscultation

                          Overall:                   breath sounds clear bilater

ally    

                          2013                   None                

 

                    Full Exam - General                   Cardiovascular        

           

auscultation of heart                   Overall:                   regular rate 

                          2013                   None                

 

                    Full Exam - General                   Cardiovascular        

           

auscultation of heart                   Overall:                   normal heart 

sounds                    2013                   None                

 

                    Full Exam - General                   Cardiovascular        

           

auscultation of heart                   S3 (ventricular gallop):                

                    present                   2013                   None 

               

 

                    Full Exam - General                   Cardiovascular        

           

auscultation of heart                   Murmur:                   previously 

known murmur unchanged                   2013                   None      

         

 

                    Full Exam - General                   Constitutional        

            general 

appearance                   Overall:                   well nourished          

                          2013                   None                

 

                    Full Exam - General                   Constitutional        

            general 

appearance                   Overall:                   well developed          

                          2013                   None                

 

                    Full Exam - General                   Constitutional        

            general 

appearance                   Overall:                   in no acute distress    

                          2013                   None                

 

                    Full Exam - General                   Neurologic            

       mental status

                    Overall:                   alert                   

3 

                                        None                

 

                    Full Exam - General                   Neurologic            

       mental status

                    Overall:                   oriented                   

2013                              None                

 

                    Full Exam - General                   Psychiatric           

        mood and 

affect                    Overall:                   normal mood and affect     

 

                          2013                   None                

 

                    Full Exam - General                   Ears/Nose/Throat      

             

otoscopic exam                   Overall:                   external auditory 

canals clear                   2013                   None                

 

                    Full Exam - General                   Ears/Nose/Throat      

             

otoscopic exam                   Overall:                   tympanic membranes 

clear                     2013                   None                

 

                    Full Exam - General                   Ears/Nose/Throat      

             

internal nose                   Overall:                   bilateral nasal 

cavities clear                   2013                   None              

 

 

                    Full Exam - General                   Ears/Nose/Throat      

             oral 

cavity/pharynx/larynx                    Overall:                   oral mucosa 

clear                     2013                   None                

 

                    Full Exam - General                   Constitutional        

            general 

appearance                   Overall:                   well nourished          

                          05/10/2013                   None                

 

                    Full Exam - General                   Constitutional        

            general 

appearance                   Overall:                   well developed          

                          05/10/2013                   None                

 

                    Full Exam - General                   Constitutional        

            general 

appearance                   Overall:                   in no acute distress    

                          05/10/2013                   None                

 

                    Full Exam - General                   Ears/Nose/Throat      

             

otoscopic exam                   Overall:                   external auditory 

canals clear                   05/10/2013                   no tragal or pinna 

pain with movement                

 

                    Full Exam - General                   Ears/Nose/Throat      

             

otoscopic exam                   Overall:                   tympanic membranes 

clear                     05/10/2013                   right TM has perforation 

(appears old?) at 6:00 position                

 

                    Full Exam - General                   Ears/Nose/Throat      

             

otoscopic exam                   Perforation:                   right           

                          05/10/2013                   None                

 

                    Full Exam - General                   Ears/Nose/Throat      

             

internal nose                   Overall:                   bilateral nasal 

cavities clear                   05/10/2013                   None              

 

 

                    Full Exam - General                   Ears/Nose/Throat      

             

internal nose                   Overall:                   turbinates benign    

                          05/10/2013                   None                

 

                    Full Exam - General                   Ears/Nose/Throat      

             

internal nose                   Overall:                   no drainage          

                          05/10/2013                   None                

 

                    Full Exam - General                   Ears/Nose/Throat      

             

internal nose                   Sinus tenderness:                   left 

maxillary                   05/10/2013                   marked                

 

                    Full Exam - General                   Ears/Nose/Throat      

             

internal nose                   Sinus tenderness:                   right 

maxillary                   05/10/2013                   marked                

 

                    Full Exam - General                   Ears/Nose/Throat      

             

internal nose                   Sinus tenderness:                   right 

frontal                   05/10/2013                   None                

 

                    Full Exam - General                   Ears/Nose/Throat      

             

internal nose                   Sinus tenderness:                   left frontal

                          05/10/2013                   None                

 

                    Full Exam - General                   Ears/Nose/Throat      

             oral 

cavity/pharynx/larynx                    Overall:                   

oropharyngeal mucosa clear                   05/10/2013                   None  

             

 

                    Full Exam - General                   Respiratory           

        auscultation

                          Overall:                   breath sounds clear bilater

ally    

                          05/10/2013                   None                

 

                    Full Exam - General                   Cardiovascular        

           

auscultation of heart                   Overall:                   regular rate 

                          05/10/2013                   None                

 

                    Full Exam - General                   Cardiovascular        

           

auscultation of heart                   Overall:                   normal heart 

sounds                    05/10/2013                   None                

 

                    Full Exam - General                   Cardiovascular        

           

auscultation of heart                   Overall:                   no murmurs   

                          05/10/2013                   None                

 

                    Full Exam - General                   Constitutional        

            general 

appearance                   Overall:                   well nourished          

                          2013                   None                

 

                    Full Exam - General                   Constitutional        

            general 

appearance                   Overall:                   well developed          

                          2013                   None                

 

                    Full Exam - General                   Constitutional        

            general 

appearance                   Overall:                   in no acute distress    

                          2013                   None                

 

                    Full Exam - General                   Neurologic            

       mental status

                    Overall:                   alert                   

3 

                                        None                

 

                    Full Exam - General                   Neurologic            

       mental status

                    Overall:                   oriented                   

2013                              None                

 

                    Full Exam - General                   Psychiatric           

        mood and 

affect                    Overall:                   normal mood and affect     

 

                          2013                   None                

 

                    Full Exam - General                   Chest/Breast          

         breast and 

axillae palpation                       Left lower inner quadrant:              

    

                    tender                   2013                   None  

              

 

                    Full Exam - General                   Chest/Breast          

         breast and 

axillae palpation                       Left lower inner quadrant:              

    

                    no mass                   2013                   None 

               

 

                    Full Exam - General                   Chest/Breast          

         breast and 

axillae palpation                       Left upper outer quadrant:              

    

                    no mass                   2013                   None 

               

 

                    Full Exam - General                   Chest/Breast          

         breast and 

axillae palpation                       Left upper inner quadrant:              

    

                    no mass                   2013                   None 

               

 

                    Full Exam - General                   Chest/Breast          

         breast and 

axillae palpation                       Left lower outer quadrant:              

    

                    no mass                   2013                   None 

               

 

                    Full Exam - General                   Chest/Breast          

         breast and 

axillae palpation                       Right lower inner quadrant:             

    

                    tender                   2013                   None  

              

 

                    Full Exam - General                   Chest/Breast          

         breast and 

axillae palpation                       Right upper inner quadrant:             

    

                    no mass                   2013                   None 

               

 

                    Full Exam - General                   Chest/Breast          

         breast and 

axillae palpation                       Right upper outer quadrant:             

    

                    no mass                   2013                   None 

               

 

                    Full Exam - General                   Chest/Breast          

         breast and 

axillae palpation                       Right lower outer quadrant:             

    

                    no mass                   2013                   None 

               

 

                    Full Exam - General                   Constitutional        

            general 

appearance                   Overall:                   well nourished          

                          2012                   None                

 

                    Full Exam - General                   Constitutional        

            general 

appearance                   Overall:                   well developed          

                          2012                   None                

 

                    Full Exam - General                   Constitutional        

            general 

appearance                   Overall:                   in no acute distress    

                          2012                   None                

 

                    Full Exam - General                   Neurologic            

       mental status

                    Overall:                   alert                   

2 

                                        None                

 

                    Full Exam - General                   Neurologic            

       mental status

                    Overall:                   oriented                   

2012                              None                

 

                    Full Exam - General                   Psychiatric           

        mood and 

affect                    Overall:                   normal mood and affect     

 

                          2012                   None                

 

                    Full Exam - General                   Respiratory           

        auscultation

                          Overall:                   breath sounds clear bilater

ally    

                          2012                   None                

 

                    Full Exam - General                   Cardiovascular        

           

auscultation of heart                   Overall:                   regular rate 

                          2012                   None                

 

                    Full Exam - General                   Cardiovascular        

           

auscultation of heart                   Overall:                   normal heart 

sounds                    2012                   None                

 

                    Full Exam - General                   Cardiovascular        

           

auscultation of heart                   S3 (ventricular gallop):                

                    present                   2012                   None 

               

 

                    Full Exam - General                   Cardiovascular        

           

auscultation of heart                   Murmur:                   previously 

known murmur unchanged                   2012                   None      

         

 

                    Full Exam - General                   Ears/Nose/Throat      

             

otoscopic exam                          Left external auditory canal:           

       

                    complete cerumen impaction                   2012     

              None  

             

 

                    Full Exam - General                   Ears/Nose/Throat      

             

otoscopic exam                          Right external auditory canal:          

       

                    partial cerumen occlusion                   2012      

             None  

             

 

                    Full Exam - General                   Ears/Nose/Throat      

             

otoscopic exam                   Left tympanic membrane:                   

abnormal light reflex                   2012                   None       

        

 

                    Full Exam - General                   Ears/Nose/Throat      

             

otoscopic exam                   Right tympanic membrane:                   

abnormal light reflex                   2012                   None       

        

 

                    Full Exam - General                   Ears/Nose/Throat      

             

internal nose                   Turbinates:                   hypertrophy       

                          2012                   None                

 

                    Full Exam - General                   Ears/Nose/Throat      

             oral 

cavity/pharynx/larynx                    Overall:                   oral mucosa 

clear                     2012                   None                

 

                    Full Exam - General                   Abdomen               

    abdominal exam  

                    Overall:                   no masses                   

2012                              None                

 

                    Full Exam - General                   Abdomen               

    abdominal exam  

                          Overall:                   normal bowel sounds        

          

                          2012                   None                

 

                    Full Exam - General                   Abdomen               

    abdominal exam  

                    Overall:                   soft                   2012

    

                                        None                

 

                    Full Exam - General                   Abdomen               

    abdominal exam  

                          Right upper quadrant:                   tender to palp

ation     

                          2012                   None                

 

                    Full Exam - General                   Abdomen               

    abdominal exam  

                          Epigastric:                   tender to palpation     

          

                          2012                   None                

 

                    Full Exam - General                   Constitutional        

            general 

appearance                   Overall:                   well nourished          

                          10/23/2012                   None                

 

                    Full Exam - General                   Constitutional        

            general 

appearance                   Overall:                   well developed          

                          10/23/2012                   None                

 

                    Full Exam - General                   Constitutional        

            general 

appearance                   Overall:                   in no acute distress    

                          10/23/2012                   None                

 

                    Full Exam - General                   Neurologic            

       mental status

                    Overall:                   alert                   10/23/201

2 

                                        None                

 

                    Full Exam - General                   Neurologic            

       mental status

                    Overall:                   oriented                   

10/23/2012                              None                

 

                    Full Exam - General                   Psychiatric           

        mood and 

affect                    Overall:                   normal mood and affect     

 

                          10/23/2012                   None                

 

                    Full Exam - General                   Abdomen               

    abdominal exam  

                    Overall:                   no masses                   

10/23/2012                              None                

 

                    Full Exam - General                   Abdomen               

    abdominal exam  

                          Overall:                   normal bowel sounds        

          

                          10/23/2012                   None                

 

                    Full Exam - General                   Abdomen               

    abdominal exam  

                    Overall:                   soft                   10/23/2012

    

                                        None                

 

                    Full Exam - General                   Abdomen               

    abdominal exam  

                          Epigastric:                   tender to palpation     

          

                          10/23/2012                   None                

 

                    Full Exam - General                   Abdomen               

    abdominal exam  

                          Left upper quadrant:                   tender to palpa

tion      

                          10/23/2012                   None                

 

                    Full Exam - General                   Abdomen               

    abdominal exam  

                          Left lower quadrant:                   tender to palpa

tion      

                          10/23/2012                   None                

 

                    Full Exam - General                   Abdomen               

    abdominal exam  

                          Right upper quadrant:                   non-tender to 

palpation 

                          10/23/2012                   None                

 

                    Full Exam - General                   Abdomen               

    abdominal exam  

                          Right lower quadrant:                   tender to palp

ation     

                          10/23/2012                   None                

 

                    Full Exam - General                   Constitutional        

            general 

appearance                   Overall:                   well nourished          

                          2012                   None                

 

                    Full Exam - General                   Constitutional        

            general 

appearance                   Overall:                   well developed          

                          2012                   None                

 

                    Full Exam - General                   Constitutional        

            general 

appearance                   Evidence of Distress:                   anxious    

                          2012                   None                

 

                    Full Exam - General                   Respiratory           

        auscultation

                          Overall:                   breath sounds clear bilater

ally    

                          2012                   None                

 

                    Full Exam - General                   Cardiovascular        

           

auscultation of heart                   Overall:                   regular rate 

                          2012                   None                

 

                    Full Exam - General                   Cardiovascular        

           

auscultation of heart                   Overall:                   normal heart 

sounds                    2012                   None                

 

                    Full Exam - General                   Cardiovascular        

           

auscultation of heart                   Murmur:                   previously 

known murmur unchanged                   2012                   None      

         

 

                    Full Exam - General                   Cardiovascular        

           

auscultation of heart                   S3 (ventricular gallop):                

                    present                   2012                   None 

               

 

                    Full Exam - General                   Neurologic            

       mental status

                    Overall:                   alert                   

2 

                                        None                

 

                    Full Exam - General                   Neurologic            

       mental status

                    Overall:                   oriented                   

2012                              None                

 

                    Full Exam - General                   Psychiatric           

        mood and 

affect                    Overall:                   normal mood and affect     

 

                          2012                   None                

 

                    Full Exam - General                   Constitutional        

            general 

appearance                   Overall:                   well nourished          

                          2012                   None                

 

                    Full Exam - General                   Constitutional        

            general 

appearance                   Overall:                   well developed          

                          2012                   None                

 

                    Full Exam - General                   Constitutional        

            general 

appearance                   Overall:                   in no acute distress    

                          2012                   None                

 

                    Full Exam - General                   Neurologic            

       mental status

                    Overall:                   alert                   

2 

                                        None                

 

                    Full Exam - General                   Neurologic            

       mental status

                    Overall:                   oriented                   

2012                              None                

 

                    Full Exam - General                   Psychiatric           

        mood and 

affect                    Overall:                   normal mood and affect     

 

                          2012                   None                

 

                    Full Exam - General                   Respiratory           

        auscultation

                          Overall:                   breath sounds clear bilater

ally    

                          2012                   None                

 

                    Full Exam - General                   Cardiovascular        

           

auscultation of heart                   Overall:                   regular rate 

                          2012                   None                

 

                    Full Exam - General                   Cardiovascular        

           

auscultation of heart                   Overall:                   normal heart 

sounds                    2012                   None                

 

                    Full Exam - General                   Cardiovascular        

           

auscultation of heart                   S3 (ventricular gallop):                

                    present                   2012                   None 

               

 

                    Full Exam - General                   Cardiovascular        

           

auscultation of heart                   Murmur:                   previously 

known murmur unchanged                   2012                   None      

         

 

                    Full Exam - General                   Cardiovascular        

           

extremities                   Overall:                   no clubbing            

                          2012                   None                

 

                    Full Exam - General                   Cardiovascular        

           

extremities                   Overall:                   No edema               

                          2012                   None                

 

                    Full Exam - General                   Cardiovascular        

           

extremities                   Overall:                   No cyanosis            

                          2012                   None                

 

                    Full Exam - General                   Constitutional        

            general 

appearance                   Overall:                   well nourished          

                          2012                   None                

 

                    Full Exam - General                   Constitutional        

            general 

appearance                   Overall:                   well developed          

                          2012                   None                

 

                    Full Exam - General                   Constitutional        

            general 

appearance                   Overall:                   in no acute distress    

                          2012                   None                

 

                    Full Exam - General                   Neurologic            

       mental status

                    Overall:                   alert                   

2 

                                        None                

 

                    Full Exam - General                   Neurologic            

       mental status

                    Overall:                   oriented                   

2012                              None                

 

                    Full Exam - General                   Psychiatric           

        mood and 

affect                    Overall:                   normal mood and affect     

 

                          2012                   None                

 

                    Full Exam - General                   Musculoskeletal       

            spine, 

ribs and pelvis                   Spine:                    tender @ lumbar spin

e

                          2012                   None                

 

                    Full Exam - General                   Constitutional        

            general 

appearance                   Overall:                   well nourished          

                          2012                   None                

 

                    Full Exam - General                   Constitutional        

            general 

appearance                   Overall:                   well developed          

                          2012                   None                

 

                    Full Exam - General                   Constitutional        

            general 

appearance                   Overall:                   in no acute distress    

                          2012                   None                

 

                    Full Exam - General                   Neurologic            

       mental status

                    Overall:                   alert                   

2 

                                        None                

 

                    Full Exam - General                   Psychiatric           

        mood and 

affect                    Overall:                   normal mood and affect     

 

                          2012                   None                

 

                    Full Exam - General                   Musculoskeletal       

            spine, 

ribs and pelvis                   Sacroiliac joints:                   tender 

right sacroiliac joint                   2012                   None      

         

 

                    Full Exam - General                   Musculoskeletal       

            spine, 

ribs and pelvis                   Spine:                    tender @ thoracic 

spine                     2012                   None                

 

                    Full Exam - General                   Musculoskeletal       

            spine, 

ribs and pelvis                   Spine:                    tender @ lumbar spin

e

                          2012                   None                

 

                    Full Exam - General                   Constitutional        

            general 

appearance                   Overall:                   well nourished          

                          2012                   None                

 

                    Full Exam - General                   Constitutional        

            general 

appearance                   Overall:                   well developed          

                          2012                   None                

 

                    Full Exam - General                   Constitutional        

            general 

appearance                   Overall:                   in no acute distress    

                          2012                   None                

 

                    Full Exam - General                   Musculoskeletal       

            spine, 

ribs and pelvis                   Spine:                    tender @ thoracic 

spine                     2012                   None                

 

                    Full Exam - General                   Neurologic            

       mental status

                    Overall:                   alert                   

2 

                                        None                

 

                    Full Exam - General                   Neurologic            

       mental status

                    Overall:                   oriented                   

2012                              None                

 

                    Full Exam - General                   Psychiatric           

        mood and 

affect                    Overall:                   normal mood and affect     

 

                          2012                   None                

 

                    Full Exam - General                   Constitutional        

            general 

appearance                   Overall:                   well nourished          

                          2012                   None                

 

                    Full Exam - General                   Constitutional        

            general 

appearance                   Overall:                   well developed          

                          2012                   None                

 

                    Full Exam - General                   Constitutional        

            general 

appearance                   Overall:                   in no acute distress    

                          2012                   None                

 

                    Full Exam - General                   Neurologic            

       mental status

                    Overall:                   alert                   

2 

                                        None                

 

                    Full Exam - General                   Neurologic            

       mental status

                    Overall:                   oriented                   

2012                              None                

 

                    Full Exam - General                   Psychiatric           

        mood and 

affect                    Overall:                   normal mood and affect     

 

                          2012                   None                

 

                    Full Exam - General                   Musculoskeletal       

            spine, 

ribs and pelvis                   Spine:                    tender @ thoracic 

spine                     2012                   None                

 

                    Full Exam - General                   Musculoskeletal       

            spine, 

ribs and pelvis                   Spine:                    tender @ lumbar spin

e

                          2012                   None                

 

                    Full Exam - General                   Musculoskeletal       

            gait and

station                   Overall:                   normal station             

                          2012                   None                

 

                    Full Exam - General                   Musculoskeletal       

            gait and

station                   Gait:                   antalgic                   

2012                              None                

 

                    Full Exam - General                   Musculoskeletal       

            spine, 

ribs and pelvis                   Sacroiliac joints:                   tender 

right sacroiliac joint                   2012                   None      

         

 

                    Full Exam - General                   Musculoskeletal       

            spine, 

ribs and pelvis                   Sacroiliac joints:                   tender 

left sacroiliac joint                   2012                   None       

        

 

                    Full Exam - General                   Constitutional        

            general 

appearance                   Overall:                   in no acute distress    

                          2012                   None                

 

                    Full Exam - General                   Constitutional        

            general 

appearance                   Overall:                   well developed          

                          2012                   None                

 

                    Full Exam - General                   Constitutional        

            general 

appearance                   Overall:                   well nourished          

                          2012                   None                

 

                    Full Exam - General                   Neurologic            

       mental status

                    Overall:                   alert                   

2 

                                        None                

 

                    Full Exam - General                   Neurologic            

       mental status

                    Overall:                   oriented                   

2012                              None                

 

                    Full Exam - General                   Psychiatric           

        mood and 

affect                    Overall:                   normal mood and affect     

 

                          2012                   None                

 

                    Full Exam - General                   Respiratory           

        auscultation

                          Right upper lung field:                   a normal exa

m       

                          2012                   None                

 

                    Full Exam - General                   Respiratory           

        auscultation

                          Right middle lung field:                   a normal ex

am      

                          2012                   None                

 

                    Full Exam - General                   Respiratory           

        auscultation

                          Right lower lung field:                   a normal exa

m       

                          2012                   None                

 

                    Full Exam - General                   Respiratory           

        auscultation

                          Left lower lung field:                   a normal exam

        

                          2012                   None                

 

                    Full Exam - General                   Respiratory           

        auscultation

                          Overall:                   breath sounds clear bilater

ally    

                          2012                   None                

 

                    Full Exam - General                   Respiratory           

        auscultation

                          Left upper lung field:                   a normal exam

        

                          2012                   None                

 

                    Full Exam - General                   Respiratory           

        auscultation

                    Diffuse:                   a normal exam                   

2012                              None                

 

                    Full Exam - General                   Cardiovascular        

           

auscultation of heart                   Overall:                   no murmurs   

                          2012                   None                

 

                    Full Exam - General                   Cardiovascular        

           

auscultation of heart                   Overall:                   regular rate 

                          2012                   None                

 

                    Full Exam - General                   Cardiovascular        

           

auscultation of heart                   Overall:                   normal heart 

sounds                    2012                   None                

 

                    Full Exam - General                   Cardiovascular        

           

auscultation of heart                   S1:                       a normal exam 

    

                          2012                   None                

 

                    Full Exam - General                   Cardiovascular        

           

auscultation of heart                   S2:                       a normal exam 

    

                          2012                   None                

 

                    Full Exam - General                   Cardiovascular        

           

auscultation of heart                   Rhythm:                   regular rhythm

                          2012                   None                

 

                    Full Exam - General                   Cardiovascular        

           

auscultation of heart                   Rate:                     regular rate  

  

                          2012                   None                

 

                    Full Exam - General                   Cardiovascular        

           

auscultation of heart                   S3 (ventricular gallop):                

                    present                   2012                   None 

               

 

                    Full Exam - General                   Cardiovascular        

           

auscultation of heart                   Murmur:                   previously 

known murmur unchanged                   2012                   None      

         

 

                    Full Exam - General                   Cardiovascular        

           

extremities                   Overall:                   no clubbing            

                          2012                   None                

 

                    Full Exam - General                   Cardiovascular        

           

extremities                   Overall:                   No edema               

                          2012                   None                

 

                    Full Exam - General                   Cardiovascular        

           

extremities                   Overall:                   No cyanosis            

                          2012                   None                

 

                    Full Exam - General                   Constitutional        

            general 

appearance                   Overall:                   in no acute distress    

                          2011                   None                

 

                    Full Exam - General                   Constitutional        

            general 

appearance                   Overall:                   well developed          

                          2011                   None                

 

                    Full Exam - General                   Constitutional        

            general 

appearance                   Overall:                   well nourished          

                          2011                   None                

 

                    Full Exam - General                   Neurologic            

       mental status

                    Overall:                   alert                   

1 

                                        None                

 

                    Full Exam - General                   Neurologic            

       mental status

                    Overall:                   oriented                   

2011                              None                

 

                    Full Exam - General                   Psychiatric           

        mood and 

affect                    Overall:                   normal mood and affect     

 

                          2011                   None                

 

                    Full Exam - General                   Cardiovascular        

           

auscultation of heart                   Overall:                   no murmurs   

                          2011                   None                

 

                    Full Exam - General                   Cardiovascular        

           

auscultation of heart                   Overall:                   regular rate 

                          2011                   None                

 

                    Full Exam - General                   Cardiovascular        

           

auscultation of heart                   Overall:                   normal heart 

sounds                    2011                   None                

 

                    Full Exam - General                   Cardiovascular        

           

auscultation of heart                   S1:                       a normal exam 

    

                          2011                   None                

 

                    Full Exam - General                   Cardiovascular        

           

auscultation of heart                   S2:                       a normal exam 

    

                          2011                   None                

 

                    Full Exam - General                   Cardiovascular        

           

auscultation of heart                   Rhythm:                   regular rhythm

                          2011                   None                

 

                    Full Exam - General                   Cardiovascular        

           

auscultation of heart                   Rate:                     regular rate  

  

                          2011                   None                

 

                    Full Exam - General                   Cardiovascular        

           

auscultation of heart                   S3 (ventricular gallop):                

                    present                   2011                   None 

               

 

                    Full Exam - General                   Respiratory           

        auscultation

                          Right upper lung field:                   a normal exa

m       

                          2011                   None                

 

                    Full Exam - General                   Respiratory           

        auscultation

                          Right middle lung field:                   a normal ex

am      

                          2011                   None                

 

                    Full Exam - General                   Respiratory           

        auscultation

                          Right lower lung field:                   a normal exa

m       

                          2011                   None                

 

                    Full Exam - General                   Respiratory           

        auscultation

                          Left lower lung field:                   a normal exam

        

                          2011                   None                

 

                    Full Exam - General                   Respiratory           

        auscultation

                          Overall:                   breath sounds clear bilater

ally    

                          2011                   None                

 

                    Full Exam - General                   Respiratory           

        auscultation

                          Left upper lung field:                   a normal exam

        

                          2011                   None                

 

                    Full Exam - General                   Respiratory           

        auscultation

                    Diffuse:                   a normal exam                   

2011                              None                

 

                    Full Exam - General                   Ears/Nose/Throat      

             

otoscopic exam                   Overall:                   tympanic membranes 

clear                     2011                   None                

 

                    Full Exam - General                   Ears/Nose/Throat      

             

internal nose                   Turbinates:                   hypertrophy       

                          2011                   None                

 

                    Full Exam - General                   Ears/Nose/Throat      

             

internal nose                   Turbinates:                   erythema          

                          2011                   None                

 

                    Full Exam - General                   Ears/Nose/Throat      

             oral 

cavity/pharynx/larynx                    Overall:                   oral mucosa 

clear                     2011                   None                

 

                    Full Exam - General                   Neck                  

 inspection of neck 

                    Overall:                   normal size                   

2011                              None                

 

                    Full Exam - General                   Neck                  

 inspection of neck 

                    Overall:                   no masses                   

2011                              None                

 

                    Full Exam - General                   Ears/Nose/Throat      

             

otoscopic exam                          Left external auditory canal:           

       

                    complete cerumen impaction                   2011     

              None  

             

 

                    Full Exam - General                   Ears/Nose/Throat      

             

otoscopic exam                          Right external auditory canal:          

       

                    complete cerumen impaction                   2011     

              None 

              

 

                    Full Exam - General                   Constitutional        

            general 

appearance                   Overall:                   in no acute distress    

                          2011                   None                

 

                    Full Exam - General                   Constitutional        

            general 

appearance                   Overall:                   well developed          

                          2011                   None                

 

                    Full Exam - General                   Constitutional        

            general 

appearance                   Overall:                   well nourished          

                          2011                   None                

 

                    Full Exam - General                   Neurologic            

       mental status

                    Overall:                   alert                   

1 

                                        None                

 

                    Full Exam - General                   Neurologic            

       mental status

                    Overall:                   oriented                   

2011                              None                

 

                    Full Exam - General                   Psychiatric           

        mood and 

affect                    Overall:                   normal mood and affect     

 

                          2011                   None                

 

                    Full Exam - General                   Respiratory           

        auscultation

                          Right upper lung field:                   a normal exa

m       

                          2011                   None                

 

                    Full Exam - General                   Respiratory           

        auscultation

                          Right middle lung field:                   a normal ex

am      

                          2011                   None                

 

                    Full Exam - General                   Respiratory           

        auscultation

                          Right lower lung field:                   a normal exa

m       

                          2011                   None                

 

                    Full Exam - General                   Respiratory           

        auscultation

                          Left lower lung field:                   a normal exam

        

                          2011                   None                

 

                    Full Exam - General                   Respiratory           

        auscultation

                          Overall:                   breath sounds clear bilater

ally    

                          2011                   None                

 

                    Full Exam - General                   Respiratory           

        auscultation

                          Left upper lung field:                   a normal exam

        

                          2011                   None                

 

                    Full Exam - General                   Respiratory           

        auscultation

                    Diffuse:                   a normal exam                   

2011                              None                

 

                    Full Exam - General                   Cardiovascular        

           

auscultation of heart                   Overall:                   no murmurs   

                          2011                   None                

 

                    Full Exam - General                   Cardiovascular        

           

auscultation of heart                   Overall:                   regular rate 

                          2011                   None                

 

                    Full Exam - General                   Cardiovascular        

           

auscultation of heart                   Overall:                   normal heart 

sounds                    2011                   None                

 

                    Full Exam - General                   Cardiovascular        

           

auscultation of heart                   S1:                       a normal exam 

    

                          2011                   None                

 

                    Full Exam - General                   Cardiovascular        

           

auscultation of heart                   S2:                       a normal exam 

    

                          2011                   None                

 

                    Full Exam - General                   Cardiovascular        

           

auscultation of heart                   Rhythm:                   regular rhythm

                          2011                   None                

 

                    Full Exam - General                   Cardiovascular        

           

auscultation of heart                   Rate:                     regular rate  

  

                          2011                   None                

 

                    Full Exam - General                   Cardiovascular        

           

auscultation of heart                   S3 (ventricular gallop):                

                    present                   2011                   None 

               

 

                    Full Exam - General                   Abdomen               

    abdominal exam  

                    Overall:                   no masses                   

2011                              None                

 

                    Full Exam - General                   Abdomen               

    abdominal exam  

                          Overall:                   normal bowel sounds        

          

                          2011                   None                

 

                    Full Exam - General                   Abdomen               

    abdominal exam  

                    Overall:                   soft                   2011

    

                                        None                

 

                    Full Exam - General                   Abdomen               

    abdominal exam  

                          Epigastric:                   tender to palpation     

          

                          2011                   None                

 

                    Full Exam - General                   Constitutional        

            general 

appearance                   Overall:                   in no acute distress    

                          2011                   None                

 

                    Full Exam - General                   Constitutional        

            general 

appearance                   Overall:                   well developed          

                          2011                   None                

 

                    Full Exam - General                   Constitutional        

            general 

appearance                   Overall:                   well nourished          

                          2011                   None                

 

                    Full Exam - General                   Neurologic            

       mental status

                    Overall:                   alert                   

1 

                                        None                

 

                    Full Exam - General                   Neurologic            

       mental status

                    Overall:                   oriented                   

2011                              None                

 

                    Full Exam - General                   Psychiatric           

        mood and 

affect                    Overall:                   normal mood and affect     

 

                          2011                   None                

 

                    Full Exam - General                   Abdomen               

    abdominal exam  

                    Overall:                   no masses                   

2011                              None                

 

                    Full Exam - General                   Abdomen               

    abdominal exam  

                    Overall:                   no tenderness                   

2011                              None                

 

                    Full Exam - General                   Abdomen               

    abdominal exam  

                          Overall:                   normal bowel sounds        

          

                          2011                   None                

 

                    Full Exam - General                   Abdomen               

    abdominal exam  

                    Overall:                   soft                   2011

    

                                        None                

 

                    Full Exam - General                   Respiratory           

        auscultation

                          Right upper lung field:                   a normal exa

m       

                          2011                   None                

 

                    Full Exam - General                   Respiratory           

        auscultation

                          Right middle lung field:                   a normal ex

am      

                          2011                   None                

 

                    Full Exam - General                   Respiratory           

        auscultation

                          Right lower lung field:                   a normal exa

m       

                          2011                   None                

 

                    Full Exam - General                   Respiratory           

        auscultation

                          Left lower lung field:                   a normal exam

        

                          2011                   None                

 

                    Full Exam - General                   Respiratory           

        auscultation

                          Overall:                   breath sounds clear bilater

ally    

                          2011                   None                

 

                    Full Exam - General                   Respiratory           

        auscultation

                          Left upper lung field:                   a normal exam

        

                          2011                   None                

 

                    Full Exam - General                   Respiratory           

        auscultation

                    Diffuse:                   a normal exam                   

2011                              None                

 

                    Full Exam - General                   Cardiovascular        

           

auscultation of heart                   Overall:                   no murmurs   

                          2011                   None                

 

                    Full Exam - General                   Cardiovascular        

           

auscultation of heart                   Overall:                   regular rate 

                          2011                   None                

 

                    Full Exam - General                   Cardiovascular        

           

auscultation of heart                   Overall:                   normal heart 

sounds                    2011                   None                

 

                    Full Exam - General                   Cardiovascular        

           

auscultation of heart                   S1:                       a normal exam 

    

                          2011                   None                

 

                    Full Exam - General                   Cardiovascular        

           

auscultation of heart                   S2:                       a normal exam 

    

                          2011                   None                

 

                    Full Exam - General                   Cardiovascular        

           

auscultation of heart                   Rhythm:                   regular rhythm

                          2011                   None                

 

                    Full Exam - General                   Cardiovascular        

           

auscultation of heart                   Rate:                     regular rate  

  

                          2011                   None                

 

                    Full Exam - General                   Cardiovascular        

           

auscultation of heart                   S3 (ventricular gallop):                

                    present                   2011                   None 

               

 

                    Full Exam - General                   Cardiovascular        

           

auscultation of heart                   Murmur:                   previously 

known murmur unchanged                   2011                   None      

         

 

                    Full Exam - General                   Cardiovascular        

           

extremities                   Overall:                   no clubbing            

                          2011                   None                

 

                    Full Exam - General                   Cardiovascular        

           

extremities                   Overall:                   No edema               

                          2011                   None                

 

                    Full Exam - General                   Cardiovascular        

           

extremities                   Overall:                   No cyanosis            

                          2011                   None                

 

                    Full Exam - General                   Cardiovascular        

           

auscultation of heart                   Overall:                   no murmurs   

                          2011                   None                

 

                    Full Exam - General                   Cardiovascular        

           

auscultation of heart                   Rate:                     regular rate  

  

                          2011                   None                

 

                    Full Exam - General                   Cardiovascular        

           

auscultation of heart                   Rhythm:                   regular rhythm

                          2011                   None                

 

                    Full Exam - General                   Cardiovascular        

           

auscultation of heart                   S1:                       a normal exam 

    

                          2011                   None                

 

                    Full Exam - General                   Cardiovascular        

           

auscultation of heart                   S2:                       a normal exam 

    

                          2011                   None                

 

                    Full Exam - General                   Cardiovascular        

           

auscultation of heart                   S3 (ventricular gallop):                

                    present                   2011                   None 

               

 

                    Full Exam - General                   Ears/Nose/Throat      

             

otoscopic exam                   Overall:                   external auditory 

canals clear                   2011                   None                

 

                    Full Exam - General                   Ears/Nose/Throat      

             

internal nose                   Turbinates:                   hypertrophy       

                          2011                   None                

 

                    Full Exam - General                   Ears/Nose/Throat      

             

internal nose                   Turbinates:                   erythema          

                          2011                   None                

 

                    Full Exam - General                   Ears/Nose/Throat      

             oral 

cavity/pharynx/larynx                    Overall:                   oral mucosa 

clear                     2011                   None                

 

                    Full Exam - General                   Constitutional        

            general 

appearance                   Overall:                   well nourished          

                          2011                   None                

 

                    Full Exam - General                   Constitutional        

            general 

appearance                   Overall:                   well developed          

                          2011                   None                

 

                    Full Exam - General                   Constitutional        

            general 

appearance                   Overall:                   in no acute distress    

                          2011                   None                

 

                    Full Exam - General                   Neurologic            

       mental status

                    Overall:                   alert                   

1 

                                        None                

 

                    Full Exam - General                   Neurologic            

       mental status

                    Overall:                   oriented                   

2011                              None                

 

                    Full Exam - General                   Psychiatric           

        mood and 

affect                    Overall:                   normal mood and affect     

 

                          2011                   None                

 

                    Full Exam - General                   Respiratory           

        auscultation

                          Overall:                   breath sounds clear bilater

ally    

                          2011                   None                

 

                    Full Exam - General                   Respiratory           

        auscultation

                    Diffuse:                   a normal exam                   

2011                              None                

 

                    Full Exam - General                   Respiratory           

        auscultation

                          Left upper lung field:                   a normal exam

        

                          2011                   None                

 

                    Full Exam - General                   Respiratory           

        auscultation

                          Left lower lung field:                   a normal exam

        

                          2011                   None                

 

                    Full Exam - General                   Respiratory           

        auscultation

                          Right upper lung field:                   a normal exa

m       

                          2011                   None                

 

                    Full Exam - General                   Respiratory           

        auscultation

                          Right middle lung field:                   a normal ex

am      

                          2011                   None                

 

                    Full Exam - General                   Respiratory           

        auscultation

                          Right lower lung field:                   a normal exa

m       

                          2011                   None                

 

                    Full Exam - General                   Cardiovascular        

           

auscultation of heart                   Overall:                   regular rate 

                          2011                   None                

 

                    Full Exam - General                   Cardiovascular        

           

auscultation of heart                   Overall:                   normal heart 

sounds                    2011                   None                

 

                    Full Exam - General                   Constitutional        

            general 

appearance                   Overall:                   well nourished          

                          2011                   None                

 

                    Full Exam - General                   Constitutional        

            general 

appearance                   Overall:                   well developed          

                          2011                   None                

 

                    Full Exam - General                   Constitutional        

            general 

appearance                   Overall:                   in no acute distress    

                          2011                   None                

 

                    Full Exam - General                   Neurologic            

       mental status

                    Overall:                   alert                   

1 

                                        None                

 

                    Full Exam - General                   Neurologic            

       mental status

                    Overall:                   oriented                   

2011                              None                

 

                    Full Exam - General                   Psychiatric           

        mood and 

affect                    Overall:                   normal mood and affect     

 

                          2011                   None                

 

                    Full Exam - General                   Musculoskeletal       

            spine, 

ribs and pelvis                   Spine:                    tender @ lumbar spin

e

                          2011                   None                

 

                    Full Exam - General                   Musculoskeletal       

            spine, 

ribs and pelvis                   Sacroiliac joints:                   tender 

right sacroiliac joint                   2011                   None      

         

 

                    Full Exam - General                   Musculoskeletal       

            spine, 

ribs and pelvis                   Sacroiliac joints:                   tender 

right sacroiliac joint                   2011                   None      

         

 

                    Full Exam - General                   Musculoskeletal       

            spine, 

ribs and pelvis                   Sacroiliac joints:                   tender 

left sacroiliac joint                   2011                   None       

        

 

                    Full Exam - General                   Respiratory           

        auscultation

                          Overall:                   breath sounds clear bilater

ally    

                          2011                   None                

 

                    Full Exam - General                   Respiratory           

        auscultation

                    Diffuse:                   a normal exam                   

2011                              None                

 

                    Full Exam - General                   Respiratory           

        auscultation

                          Left upper lung field:                   a normal exam

        

                          2011                   None                

 

                    Full Exam - General                   Respiratory           

        auscultation

                          Left lower lung field:                   a normal exam

        

                          2011                   None                

 

                    Full Exam - General                   Respiratory           

        auscultation

                          Right upper lung field:                   a normal exa

m       

                          2011                   None                

 

                    Full Exam - General                   Respiratory           

        auscultation

                          Right middle lung field:                   a normal ex

am      

                          2011                   None                

 

                    Full Exam - General                   Respiratory           

        auscultation

                          Right lower lung field:                   a normal exa

m       

                          2011                   None                

 

                    Full Exam - General                   Cardiovascular        

           

auscultation of heart                   Overall:                   regular rate 

                          2011                   None                

 

                    Full Exam - General                   Cardiovascular        

           

auscultation of heart                   Overall:                   normal heart 

sounds                    2011                   None                

 

                    Full Exam - General                   Cardiovascular        

           

auscultation of heart                   Overall:                   no murmurs   

                          2011                   None                

 

                    Full Exam - General                   Cardiovascular        

           

auscultation of heart                   Rate:                     regular rate  

  

                          2011                   None                

 

                    Full Exam - General                   Cardiovascular        

           

auscultation of heart                   Rhythm:                   regular rhythm

                          2011                   None                

 

                    Full Exam - General                   Constitutional        

            general 

appearance                   Overall:                   well nourished          

                          2011                   None                

 

                    Full Exam - General                   Constitutional        

            general 

appearance                   Overall:                   well developed          

                          2011                   None                

 

                    Full Exam - General                   Constitutional        

            general 

appearance                   Overall:                   in no acute distress    

                          2011                   None                

 

                    Full Exam - General                   Neurologic            

       mental status

                    Overall:                   alert                   

1 

                                        None                

 

                    Full Exam - General                   Neurologic            

       mental status

                    Overall:                   oriented                   

2011                              None                

 

                    Full Exam - General                   Psychiatric           

        mood and 

affect                    Overall:                   normal mood and affect     

 

                          2011                   None                

 

                    Full Exam - General                   Musculoskeletal       

            spine, 

ribs and pelvis                   Spine:                    tender @ lumbar spin

e

                          2011                   None                

 

                    Full Exam - General                   Cardiovascular        

           

auscultation of heart                   S1:                       a normal exam 

    

                          2011                   None                

 

                    Full Exam - General                   Cardiovascular        

           

auscultation of heart                   S2:                       a normal exam 

    

                          2011                   None                

 

                    Full Exam - General                   Cardiovascular        

           

auscultation of heart                   S3 (ventricular gallop):                

                    present                   2011                   None 

               

 

                    Full Exam - General                   Eyes                  

 conjunctiva/eyelids

                    Overall:                   cornea clear                   

2010                              None                

 

                    Full Exam - General                   Eyes                  

 conjunctiva/eyelids

                    Overall:                   eyelids normal                   

2010                              None                

 

                    Full Exam - General                   Eyes                  

 pupils and irises  

                          Overall:                   pupils equal, round, reacti

ve to 

light and accomodation                   2010                   None      

         

 

                    Full Exam - General                   Ears/Nose/Throat      

             

external ear                   Overall:                   normal appearance     

                          2010                   None                

 

                    Full Exam - General                   Ears/Nose/Throat      

             

otoscopic exam                          Left external auditory canal:           

       

                    complete cerumen impaction                   2010     

              None  

             

 

                    Full Exam - General                   Ears/Nose/Throat      

             

otoscopic exam                          Right external auditory canal:          

       

                    complete cerumen impaction                   2010     

              None 

              

 

                    Full Exam - General                   Ears/Nose/Throat      

             

otoscopic exam                   Left tympanic membrane:                   not 

visualized                   2010                   None                

 

                    Full Exam - General                   Ears/Nose/Throat      

             

otoscopic exam                   Right tympanic membrane:                   not 

visualized                   2010                   None                

 

                    Full Exam - General                   Ears/Nose/Throat      

             

lips/teeth/gingiva                   Overall:                   benign lips     

                          2010                   None                

 

                    Full Exam - General                   Ears/Nose/Throat      

             

lips/teeth/gingiva                   Overall:                   benign gingiva  

                          2010                   None                

 

                    Full Exam - General                   Ears/Nose/Throat      

             

lips/teeth/gingiva                   Overall:                   normal dentition

                          2010                   None                

 

                    Full Exam - General                   Ears/Nose/Throat      

             oral 

cavity/pharynx/larynx                    Overall:                   tonsils 

benign                    2010                   None                

 

                    Full Exam - General                   Ears/Nose/Throat      

             oral 

cavity/pharynx/larynx                    Oropharynx:                   erythema 

                          2010                   None                

 

                    Full Exam - General                   Respiratory           

        auscultation

                          Overall:                   breath sounds clear bilater

ally    

                          2010                   None                

 

                    Full Exam - General                   Respiratory           

        respiratory 

effort/rhythm                   Overall:                   no retractions       

                          2010                   None                

 

                    Full Exam - General                   Respiratory           

        respiratory 

effort/rhythm                   Overall:                   normal rate          

                          2010                   occasionally coughs durin

g the exam        

       

 

                    Full Exam - General                   Cardiovascular        

           

auscultation of heart                   Overall:                   regular rate 

                          2010                   None                

 

                    Full Exam - General                   Cardiovascular        

           

auscultation of heart                   Overall:                   normal heart 

sounds                    2010                   None                

 

                    Full Exam - General                   Psychiatric           

        

orientation/consciousness                   Overall:                   oriented 

to person, place and time                   2010                   None   

            

 

                    Full Exam - General                   Constitutional        

            general 

appearance                   Overall:                   well nourished          

                          2010                   None                

 

                    Full Exam - General                   Constitutional        

            general 

appearance                   Overall:                   well developed          

                          2010                   None                

 

                    Full Exam - General                   Constitutional        

            general 

appearance                   Overall:                   in no acute distress    

                          2010                   None                

 

                    Full Exam - General                   Neurologic            

       mental status

                    Overall:                   alert                   

0 

                                        None                

 

                    Full Exam - General                   Cardiovascular        

           

auscultation of heart                   Rate:                     regular rate  

  

                          2010                   None                

 

                    Full Exam - General                   Cardiovascular        

           

auscultation of heart                   Rhythm:                   regular rhythm

                          2010                   None                

 

                    Full Exam - General                   Respiratory           

        auscultation

                          Right middle lung field:                   a normal ex

am      

                          2010                   None                

 

                    Full Exam - General                   Respiratory           

        auscultation

                          Right lower lung field:                   a normal exa

m       

                          2010                   None                

 

                    Full Exam - General                   Cardiovascular        

           

auscultation of heart                   Overall:                   regular rate 

                          2010                   None                

 

                    Full Exam - General                   Cardiovascular        

           

auscultation of heart                   Overall:                   normal heart 

sounds                    2010                   None                

 

                    Full Exam - General                   Cardiovascular        

           

auscultation of heart                   Overall:                   no murmurs   

                          2010                   None                

 

                    Full Exam - General                   Cardiovascular        

           

auscultation of heart                   S1:                       a normal exam 

    

                          2010                   None                

 

                    Full Exam - General                   Cardiovascular        

           

auscultation of heart                   S2:                       a normal exam 

    

                          2010                   None                

 

                    Full Exam - General                   Cardiovascular        

           

auscultation of heart                   S3 (ventricular gallop):                

                    present                   2010                   None 

               

 

                    Full Exam - General                   Abdomen               

    abdominal exam  

                    Overall:                   no tenderness                   

2010                              None                

 

                    Full Exam - General                   Neurologic            

       mental status

                    Overall:                   oriented                   

2010                              None                

 

                    Full Exam - General                   Psychiatric           

        mood and 

affect                    Overall:                   normal mood and affect     

 

                          2010                   None                

 

                    Full Exam - General                   Abdomen               

    abdominal exam  

                    Overall:                   no masses                   

2010                              None                

 

                    Full Exam - General                   Abdomen               

    abdominal exam  

                          Overall:                   normal bowel sounds        

          

                          2010                   None                

 

                    Full Exam - General                   Abdomen               

    abdominal exam  

                    Overall:                   soft                   2010

    

                                        None                

 

                    Full Exam - General                   Respiratory           

        auscultation

                          Overall:                   breath sounds clear bilater

ally    

                          2010                   None                

 

                    Full Exam - General                   Respiratory           

        auscultation

                    Diffuse:                   a normal exam                   

2010                              None                

 

                    Full Exam - General                   Respiratory           

        auscultation

                          Left upper lung field:                   a normal exam

        

                          2010                   None                

 

                    Full Exam - General                   Respiratory           

        auscultation

                          Left lower lung field:                   a normal exam

        

                          2010                   None                

 

                    Full Exam - General                   Respiratory           

        auscultation

                          Right upper lung field:                   a normal exa

m       

                          2010                   None                

 

                    Full Exam - General                   Neurologic            

       mental status

                    Overall:                   oriented                   

2010                              None                

 

                    Full Exam - General                   Psychiatric           

        mood and 

affect                   Mood:                   depressed                   

2010                              tearful                

 

                    Full Exam - General                   Neurologic            

       mental status

                    Overall:                   alert                   

0 

                                        None                

 

                    Full Exam - General                   Cardiovascular        

           

auscultation of heart                   Rate:                     regular rate  

  

                          2010                   None                

 

                    Full Exam - General                   Cardiovascular        

           

auscultation of heart                   Rhythm:                   regular rhythm

                          2010                   None                

 

                    Full Exam - General                   Cardiovascular        

           

auscultation of heart                   S1:                       a normal exam 

    

                          2010                   None                

 

                    Full Exam - General                   Cardiovascular        

           

auscultation of heart                   S2:                       a normal exam 

    

                          2010                   None                

 

                    Full Exam - General                   Cardiovascular        

           

auscultation of heart                   S3 (ventricular gallop):                

                    present                   2010                   None 

               

 

                    Full Exam - General                   Cardiovascular        

           

extremities                   Overall:                   no clubbing            

                          2010                   None                

 

                    Full Exam - General                   Cardiovascular        

           

extremities                   Overall:                   No edema               

                          2010                   None                

 

                    Full Exam - General                   Cardiovascular        

           

extremities                   Overall:                   No cyanosis            

                          2010                   None                

 

                    Full Exam - General                   Psychiatric           

        mood and 

affect                   Mood:                   anxious                   

2010                              ringing hands                

 

                    Full Exam - General                   Psychiatric           

        mood and 

affect                    Affect:                   constricted                 

 

                          2010                   None                

 

                    Full Exam - General                   Respiratory           

        auscultation

                          Overall:                   breath sounds clear bilater

ally    

                          2010                   None                

 

                    Full Exam - General                   Respiratory           

        auscultation

                    Diffuse:                   a normal exam                   

2010                              None                

 

                    Full Exam - General                   Respiratory           

        auscultation

                          Left upper lung field:                   a normal exam

        

                          2010                   None                

 

                    Full Exam - General                   Respiratory           

        auscultation

                          Left lower lung field:                   a normal exam

        

                          2010                   None                

 

                    Full Exam - General                   Respiratory           

        auscultation

                          Right upper lung field:                   a normal exa

m       

                          2010                   None                

 

                    Full Exam - General                   Respiratory           

        auscultation

                          Right middle lung field:                   a normal ex

am      

                          2010                   None                

 

                    Full Exam - General                   Respiratory           

        auscultation

                          Right lower lung field:                   a normal exa

m       

                          2010                   None                

 

                    Full Exam - General                   Cardiovascular        

           

auscultation of heart                   Overall:                   regular rate 

                          2010                   None                

 

                    Full Exam - General                   Cardiovascular        

           

auscultation of heart                   Overall:                   normal heart 

sounds                    2010                   None                

 

                    Full Exam - General                   Cardiovascular        

           

auscultation of heart                   Overall:                   no murmurs   

                          2010                   None                

 

                    Full Exam - General                   Constitutional        

            general 

appearance                   Overall:                   well nourished          

                          2010                   None                

 

                    Full Exam - General                   Constitutional        

            general 

appearance                   Overall:                   well developed          

                          2010                   None                

 

                    Full Exam - General                   Constitutional        

            general 

appearance                   Overall:                   in no acute distress    

                          2010                   None                

 

                    Full Exam - General                   Psychiatric           

        mood and 

affect                    Overall:                   normal mood and affect     

 

                          2010                   None                

 

                    Full Exam - General                   Respiratory           

        auscultation

                          Right middle lung field:                   a normal ex

am      

                          2010                   None                

 

                    Full Exam - General                   Abdomen               

    abdominal exam  

                    Overall:                   no masses                   

2010                              None                

 

                    Full Exam - General                   Abdomen               

    abdominal exam  

                          Overall:                   normal bowel sounds        

          

                          2010                   None                

 

                    Full Exam - General                   Constitutional        

            general 

appearance                   Overall:                   well nourished          

                          2010                   None                

 

                    Full Exam - General                   Constitutional        

            general 

appearance                   Overall:                   well developed          

                          2010                   None                

 

                    Full Exam - General                   Constitutional        

            general 

appearance                   Overall:                   in no acute distress    

                          2010                   None                

 

                    Full Exam - General                   Respiratory           

        auscultation

                          Overall:                   breath sounds clear bilater

ally    

                          2010                   None                

 

                    Full Exam - General                   Respiratory           

        auscultation

                    Diffuse:                   a normal exam                   

2010                              None                

 

                    Full Exam - General                   Respiratory           

        auscultation

                          Left upper lung field:                   a normal exam

        

                          2010                   None                

 

                    Full Exam - General                   Respiratory           

        auscultation

                          Left lower lung field:                   a normal exam

        

                          2010                   None                

 

                    Full Exam - General                   Respiratory           

        auscultation

                          Right upper lung field:                   a normal exa

m       

                          2010                   None                

 

                    Full Exam - General                   Respiratory           

        auscultation

                          Right lower lung field:                   a normal exa

m       

                          2010                   None                

 

                    Full Exam - General                   Cardiovascular        

           

auscultation of heart                   Overall:                   regular rate 

                          2010                   None                

 

                    Full Exam - General                   Cardiovascular        

           

auscultation of heart                   Overall:                   normal heart 

sounds                    2010                   None                

 

                    Full Exam - General                   Cardiovascular        

           

auscultation of heart                   Overall:                   no murmurs   

                          2010                   None                

 

                    Full Exam - General                   Cardiovascular        

           

auscultation of heart                   Rate:                     regular rate  

  

                          2010                   None                

 

                    Full Exam - General                   Cardiovascular        

           

auscultation of heart                   Rhythm:                   regular rhythm

                          2010                   None                

 

                    Full Exam - General                   Cardiovascular        

           

auscultation of heart                   S1:                       a normal exam 

    

                          2010                   None                

 

                    Full Exam - General                   Abdomen               

    abdominal exam  

                    Overall:                   soft                   2010

    

                                        None                

 

                    Full Exam - General                   Abdomen               

    abdominal exam  

                          Epigastric:                   tender to palpation     

          

                          2010                   None                

 

                    Full Exam - General                   Neurologic            

       mental status

                    Overall:                   alert                   

0 

                                        None                

 

                    Full Exam - General                   Neurologic            

       mental status

                    Overall:                   oriented                   

2010                              None                

 

                    Full Exam - General                   Cardiovascular        

           

auscultation of heart                   S2:                       a normal exam 

    

                          2010                   None                

 

                    Full Exam - General                   Cardiovascular        

           

extremities                   Overall:                   no clubbing            

                          2010                   None                

 

                    Full Exam - General                   Cardiovascular        

           

extremities                   Overall:                   No edema               

                          2010                   None                

 

                    Full Exam - General                   Cardiovascular        

           

extremities                   Overall:                   No cyanosis            

                          2010                   None                

 

                    Full Exam - General                   Constitutional        

            general 

appearance                   Overall:                   well nourished          

                          2010                   None                

 

                    Full Exam - General                   Constitutional        

            general 

appearance                   Overall:                   well developed          

                          2010                   None                

 

                    Full Exam - General                   Constitutional        

            general 

appearance                   Overall:                   in no acute distress    

                          2010                   None                

 

                    Full Exam - General                   Respiratory           

        auscultation

                          Overall:                   breath sounds clear bilater

ally    

                          2010                   None                

 

                    Full Exam - General                   Respiratory           

        auscultation

                    Diffuse:                   a normal exam                   

2010                              None                

 

                    Full Exam - General                   Respiratory           

        auscultation

                          Left upper lung field:                   a normal exam

        

                          2010                   None                

 

                    Full Exam - General                   Respiratory           

        auscultation

                          Left lower lung field:                   a normal exam

        

                          2010                   None                

 

                    Full Exam - General                   Respiratory           

        auscultation

                          Right upper lung field:                   a normal exa

m       

                          2010                   None                

 

                    Full Exam - General                   Respiratory           

        auscultation

                          Right middle lung field:                   a normal ex

am      

                          2010                   None                

 

                    Full Exam - General                   Respiratory           

        auscultation

                          Right lower lung field:                   a normal exa

m       

                          2010                   None                

 

                    Full Exam - General                   Cardiovascular        

           

auscultation of heart                   Overall:                   regular rate 

                          2010                   None                

 

                    Full Exam - General                   Cardiovascular        

           

auscultation of heart                   Overall:                   normal heart 

sounds                    2010                   None                

 

                    Full Exam - General                   Cardiovascular        

           

auscultation of heart                   Murmur:                   previously 

known murmur unchanged                   2010                   None      

         

 

                    Full Exam - General                   Cardiovascular        

           

extremities                   Overall:                   no clubbing            

                          2010                   None                

 

                    Full Exam - General                   Cardiovascular        

           

extremities                   Overall:                   No cyanosis            

                          2010                   None                

 

                    Full Exam - General                   Cardiovascular        

           

extremities                   Edema present:                   severity 1+ - 4+:

trace                     2010                   None                

 

                    Full Exam - General                   Abdomen               

    abdominal exam  

                    Overall:                   no masses                   

2010                              None                

 

                    Full Exam - General                   Abdomen               

    abdominal exam  

                    Overall:                   no tenderness                   

2010                              None                

 

                    Full Exam - General                   Abdomen               

    abdominal exam  

                          Overall:                   normal bowel sounds        

          

                          2010                   None                

 

                    Full Exam - General                   Abdomen               

    abdominal exam  

                    Overall:                   soft                   2010

    

                                        None                

 

                    Full Exam - General                   Neurologic            

       mental status

                    Overall:                   alert                   

0 

                                        None                

 

                    Full Exam - General                   Neurologic            

       mental status

                    Overall:                   oriented                   

2010                              None                

 

                    Full Exam - General                   Psychiatric           

        mood and 

affect                   Affect:                   flat                   

2010                              None                



                                                                              



Procedures

                      



                    Procedure                   Codes                   Date    

            

 

                                              ROUTINE VENIPUNCTURE              

                       

CPT-4: 24447                            06/10/2019                

 

                                                            ASSAY THYROID STIM H

ORMONE                                  

                          CPT-4: 70652                   06/10/2019             

   

 

                                                            COMPREHEN METABOLIC 

PANEL                                   

                          CPT-4: 20397                   06/10/2019             

   

 

                                                            COMPLETE CBC W/AUTO 

DIFF WBC                                

                          CPT-4: 37905                   06/10/2019             

   

 

                                                            URINALYSIS NONAUTO W

/O SCOPE                                

                          CPT-4: 19439                   2019             

   

 

                                                            URINE CULTURE/ COLON

Y COUNT                                 

                          CPT-4: 87896                   2019             

   

 

                                                            URINE CULTURE/ COLON

Y COUNT                                 

                          CPT-4: 47826                   10/02/2018             

   

 

                                              ROUTINE VENIPUNCTURE              

                       

CPT-4: 84912                            2018                

 

                                              ASSAY OF FREE THYROXINE           

                          

CPT-4: 56185                            2018                

 

                                                            ASSAY THYROID STIM H

ORMONE                                  

                          CPT-4: 17637                   2018             

   

 

                                                            COMPLETE CBC W/AUTO 

DIFF WBC                                

                          CPT-4: 21081                   2018             

   

 

                                                            METABOLIC PANEL TOTA

L CA                                    

                          CPT-4: 81742                   2018             

   

 

                                                            FLU VACC PRSV FREE I

NC ANTIG 65 AND OLDER                   

                          CPT-4: 32341                   2018             

   

 

                                                            ASSAY, GLUCOSE, BLOO

D QUANT                                 

                          CPT-4: 41765                   2018             

   

 

                                                            ADMIN INFLUENZA VIRU

S VAC                                   

                          CPT-4:                    2018             

   

 

                                              ROUTINE VENIPUNCTURE              

                       

CPT-4: 79844                            2018                

 

                                                            COMPREHEN METABOLIC 

PANEL                                   

                          CPT-4: 78059                   2018             

   

 

                                                            COMPLETE CBC W/AUTO 

DIFF WBC                                

                          CPT-4: 43213                   2018             

   

 

                                              A1C HPLC                          

           CPT-4: 56691   

                                        2018                

 

                                              ASSAY OF TROPONIN QUANT           

                          

CPT-4: 75188                            2018                

 

                                              LIPID PANEL                       

              CPT-4: 91942

                                        2018                

 

                                                            THER/PROPH/DIAG INJ 

SC/IM                                   

                          CPT-4: 08672                   2018             

   

 

                                                            TRIAMCINOLONE ACET I

N[place] NOS                                  

                          CPT-4:                    2018             

   

 

                                                            URINALYSIS NONAUTO W

/O SCOPE                                

                          CPT-4: 98601                   2018             

   

 

                                                            URINE CULTURE/ COLON

Y COUNT                                 

                          CPT-4: 19491                   2018             

   

 

                                                            FLU VACC PRSV FREE I

NC ANTIG 65 AND OLDER                   

                          CPT-4: 00074                   10/06/2017             

   

 

                                                            ADMIN INFLUENZA VIRU

S VAC                                   

                          CPT-4:                    10/06/2017             

   

 

                                              ROUTINE VENIPUNCTURE              

                       

CPT-4: 59061                            2017                

 

                                                            COMPREHEN METABOLIC 

PANEL                                   

                          CPT-4: 89863                   2017             

   

 

                                                            COMPLETE CBC W/AUTO 

DIFF WBC                                

                          CPT-4: 31067                   2017             

   

 

                                              LIPID PANEL                       

              CPT-4: 18859

                                        2017                

 

                                              A1C HPLC                          

           CPT-4: 21831   

                                        2017                

 

                                                            ASSAY THYROID STIM H

ORMONE                                  

                          CPT-4: 64108                   2017             

   

 

                                              ROUTINE VENIPUNCTURE              

                       

CPT-4: 66085                            2017                

 

                                                            ASSAY THYROID STIM H

ORMONE                                  

                          CPT-4: 69791                   2017             

   

 

                                                            COMPREHEN METABOLIC 

PANEL                                   

                          CPT-4: 17675                   2017             

   

 

                                                            COMPLETE CBC W/AUTO 

DIFF WBC                                

                          CPT-4: 11108                   2017             

   

 

                                              A1C HPLC                          

           CPT-4: 33738   

                                        2017                

 

                                                            FLU VACC PRSV FREE I

NC ANTIG 64 AND OLDER                   

                          CPT-4: 26069                   10/07/2016             

   

 

                                                            ADMIN INFLUENZA VIRU

S VAC                                   

                          CPT-4:                    10/07/2016             

   

 

                                              ROUTINE VENIPUNCTURE              

                       

CPT-4: 02248                            2016                

 

                                              ASSAY OF FREE THYROXINE           

                          

CPT-4: 51811                            2016                

 

                                                            ASSAY THYROID STIM H

ORMONE                                  

                          CPT-4: 43170                   2016             

   

 

                                                            COMPREHEN METABOLIC 

PANEL                                   

                          CPT-4: 23516                   2016             

   

 

                                                            COMPLETE CBC W/AUTO 

DIFF WBC                                

                          CPT-4: 24331                   2016             

   

 

                                              LIPID PANEL                       

              CPT-4: 18778

                                        2016                

 

                                              A1C HPLC                          

           CPT-4: 46515   

                                        2016                

 

                                                            URINALYSIS NONAUTO W

/O SCOPE                                

                          CPT-4: 78721                   2016             

   

 

                                              ROUTINE VENIPUNCTURE              

                       

CPT-4: 31023                            2015                

 

                                                            METABOLIC PANEL TOTA

L CA                                    

                          CPT-4: 51854                   2015             

   

 

                                                            PRESCRIP TRANSMIT VI

A ERX SY                                

                          CPT-4:                    2015             

   

 

                                                            FLU VACC PRSV FREE I

NC ANTIG 64 AND OLDER                   

                          CPT-4: 48806                   10/16/2015             

   

 

                                                            ADMIN INFLUENZA VIRU

S VAC                                   

                          CPT-4:                    10/16/2015             

   

 

                                                            URINALYSIS NONAUTO W

/O SCOPE                                

                          CPT-4: 18303                   09/15/2015             

   

 

                                                            URINE CULTURE/ COLON

Y COUNT                                 

                          CPT-4: 19839                   09/15/2015             

   

 

                                              ROUTINE VENIPUNCTURE              

                       

CPT-4: 97081                            09/10/2015                

 

                                              ASSAY OF FREE THYROXINE           

                          

CPT-4: 21218                            09/10/2015                

 

                                                            ASSAY THYROID STIM H

ORMONE                                  

                          CPT-4: 62018                   09/10/2015             

   

 

                                                            COMPREHEN METABOLIC 

PANEL                                   

                          CPT-4: 69213                   09/10/2015             

   

 

                                                            COMPLETE CBC W/AUTO 

DIFF WBC                                

                          CPT-4: 47601                   09/10/2015             

   

 

                                              LIPID PANEL                       

              CPT-4: 95687

                                        09/10/2015                

 

                                              A1C HPLC                          

           CPT-4: 78939   

                                        09/10/2015                

 

                                              CERUM REMOVAL                     

                CPT-4: 

75477                                   2015                

 

                                                            PRESCRIP TRANSMIT VI

A ERX SY                                

                          CPT-4:                    2015             

   

 

                                              FLUZONE, 5ML (Medicare)           

                          

CPT-4:                             10/17/2014                

 

                                                            ADMIN INFLUENZA VIRU

S VAC                                   

                          CPT-4:                    10/17/2014             

   

 

                                                            PRESCRIP TRANSMIT VI

A ERX SY                                

                          CPT-4:                    2014             

   

 

                                                            PRESCRIP TRANSMIT VI

A ERX SY                                

                          CPT-4:                    2014             

   

 

                                                            PRESCRIP TRANSMIT VI

A ERX SY                                

                          CPT-4:                    2014             

   

 

                                              ROUTINE VENIPUNCTURE              

                       

CPT-4: 07109                            2014                

 

                                              ASSAY OF FREE THYROXINE           

                          

CPT-4: 18775                            2014                

 

                                                            ASSAY THYROID STIM H

ORMONE                                  

                          CPT-4: 59398                   2014             

   

 

                                                            COMPREHEN METABOLIC 

PANEL                                   

                          CPT-4: 79809                   2014             

   

 

                                                            COMPLETE CBC W/AUTO 

DIFF WBC                                

                          CPT-4: 18417                   2014             

   

 

                                              LIPID PANEL                       

              CPT-4: 38166

                                        2014                

 

                                              A1C HPLC                          

           CPT-4: 30298   

                                        2014                

 

                                              VITAMIN B 12 FOLIC ACID           

                          

CPT-4: 89421|53805                      2014                

 

                                                            PRESCRIP TRANSMIT VI

A ERX SY                                

                          CPT-4:                    2014             

   

 

                                                            PRESCRIP TRANSMIT VI

A ERX SY                                

                          CPT-4:                    10/15/2013             

   

 

                                              FLUZONE, 5ML (Medicare)           

                          

CPT-4:                             2013                

 

                                                            ADMIN INFLUENZA VIRU

S VAC                                   

                          CPT-4:                    2013             

   

 

                                                            PRESCRIP TRANSMIT VI

A ERX SY                                

                          CPT-4:                    2013             

   

 

                                                            PRESCRIP TRANSMIT VI

A ERX SY                                

                          CPT-4:                    05/10/2013             

   

 

                                              ROUTINE VENIPUNCTURE              

                       

CPT-4: 18900                            2012                

 

                                              ASSAY OF FREE THYROXINE           

                          

CPT-4: 89578                            2012                

 

                                                            ASSAY THYROID STIM H

ORMONE                                  

                          CPT-4: 58196                   2012             

   

 

                                                            COMPREHEN METABOLIC 

PANEL                                   

                          CPT-4: 03153                   2012             

   

 

                                                            COMPLETE CBC W/AUTO 

DIFF WBC                                

                          CPT-4: 24726                   2012             

   

 

                                              LIPID PANEL                       

              CPT-4: 08218

                                        2012                

 

                                                            A1C GLYCOSYLATED HEM

OGLOBIN TEST                            

                          CPT-4: 77096                   2012             

   

 

                                              CERUM REMOVAL                     

                CPT-4: 

30284                                   2012                

 

                                                            PRESCRIP TRANSMIT VI

A ERX SY                                

                          CPT-4:                    2012             

   

 

                                                            PRESCRIP TRANSMIT VI

A ERX SY                                

                          CPT-4:                    10/10/2012             

   

 

                                              FLUZONE, 5ML (Medicare)           

                          

CPT-4:                             2012                

 

                                                            ADMIN INFLUENZA VIRU

S VAC                                   

                          CPT-4:                    2012             

   

 

                                                            ASSAY, GLUCOSE, BLOO

D QUANT                                 

                          CPT-4: 59946                   2012             

   

 

                                                            URINALYSIS NONAUTO W

/O SCOPE                                

                          CPT-4: 00426                   2012             

   

 

                                                            URINE CULTURE/ COLON

Y COUNT                                 

                          CPT-4: 56038                   2012             

   

 

                                              ROUTINE VENIPUNCTURE              

                       

CPT-4: 58678                            2012                

 

                                              ASSAY OF FREE THYROXINE           

                          

CPT-4: 14768                            2012                

 

                                                            ASSAY THYROID STIM H

ORMONE                                  

                          CPT-4: 58750                   2012             

   

 

                                                            COMPREHEN METABOLIC 

PANEL                                   

                          CPT-4: 84373                   2012             

   

 

                                                            COMPLETE CBC W/AUTO 

DIFF WBC                                

                          CPT-4: 68805                   2012             

   

 

                                              LIPID PANEL                       

              CPT-4: 96845

                                        2012                

 

                                              ASSAY OF INSULIN                  

                   CPT-4: 

40001                                   2012                

 

                                                            A1C GLYCOSYLATED HEM

OGLOBIN TEST                            

                          CPT-4: 76064                   2012             

   

 

                                                            DRAIN/INJECT JOINT/B

URSA                                    

                          CPT-4: 93136                   2012             

   

 

                                                            METHYLPREDNISOLONE 4

0 MG INJ                                

                          CPT-4:                    2012             

   

 

                                                            TRIAMCINOLONE ACET I

N[place] NOS                                  

                          CPT-4:                    2012             

   

 

                                                            PRESCRIP TRANSMIT VI

A ERX SY                                

                          CPT-4:                    2012             

   

 

                                                            PRESCRIP TRANSMIT VI

A ERX SY                                

                          CPT-4:                    2012             

   

 

                                                            METHYLPREDNISOLONE 4

0 MG INJ                                

                          CPT-4:                    2012             

   

 

                                                            DRAIN/INJECT JOINT/B

URSA                                    

                          CPT-4: 87458                   2012             

   

 

                                                            TRIAMCINOLONE ACET I

N[place] NOS                                  

                          CPT-4:                    2012             

   

 

                                                            PRESCRIP TRANSMIT VI

A ERX SY                                

                          CPT-4:                    2012             

   

 

                                              ROUTINE VENIPUNCTURE              

                       

CPT-4: 65180                            2012                

 

                                              ASSAY OF FREE THYROXINE           

                          

CPT-4: 13084                            2012                

 

                                                            ASSAY THYROID STIM H

ORMONE                                  

                          CPT-4: 41126                   2012             

   

 

                                                            COMPREHEN METABOLIC 

PANEL                                   

                          CPT-4: 13414                   2012             

   

 

                                                            COMPLETE CBC W/AUTO 

DIFF WBC                                

                          CPT-4: 37335                   2012             

   

 

                                              LIPID PANEL                       

              CPT-4: 91250

                                        2012                

 

                                                            PRESCRIP TRANSMIT VI

A ERX SY                                

                          CPT-4:                    2012             

   

 

                                              CERUM REMOVAL                     

                CPT-4: 

35744                                   2011                

 

                                                            PRESCRIP TRANSMIT VI

A ERX SY                                

                          CPT-4:                    2011             

   

 

                                              FLUZONE, 5ML (Medicare)           

                          

CPT-4:                             10/05/2011                

 

                                                            ADMIN INFLUENZA VIRU

S VAC                                   

                          CPT-4:                    10/05/2011             

   

 

                                                            PRESCRIP TRANSMIT VI

A ERX SY                                

                          CPT-4:                    2011             

   

 

                                                            URINALYSIS NONAUTO W

/O SCOPE                                

                          CPT-4: 29611                   2011             

   

 

                                                            URINE CULTURE/ COLON

Y COUNT                                 

                          CPT-4: 53551                   2011             

   

 

                                              CUR TOBACCO NON-USER              

                       

CPT-4:                             2011                

 

                                              ROUTINE VENIPUNCTURE              

                       

CPT-4: 79819                            2011                

 

                                                            COMPLETE CBC W/AUTO 

DIFF WBC                                

                          CPT-4: 75530                   2011             

   

 

                                                            COMPREHEN METABOLIC 

PANEL                                   

                          CPT-4: 03226                   2011             

   

 

                                              LIPID PANEL                       

              CPT-4: 69880

                                        2011                

 

                                                            ASSAY THYROID STIM H

ORMONE                                  

                          CPT-4: 00087                   2011             

   

 

                                              ASSAY OF FREE THYROXINE           

                          

CPT-4: 21890                            2011                

 

                                                            PRESCRIP TRANSMIT VI

A ERX SY                                

                          CPT-4:                    2011             

   

 

                                                            INJ TRIGGER POINT 1/

2 MUSCL                                 

                          CPT-4: 94509                   2011             

   

 

                                                            TRIAMCINOLONE ACET I

N[place] NOS                                  

                          CPT-4:                    2011             

   

 

                                                            METHYLPREDNISOLONE 4

0 MG INJ                                

                          CPT-4:                    2011             

   

 

                                                            THER/PROPH/DIAG INJ 

SC/IM                                   

                          CPT-4: 95111                   2011             

   

 

                                                            KETOROLAC TROMETHAMI

NE INJ                                  

                          CPT-4:                    2011             

   

 

                                                            PRESCRIP TRANSMIT VI

A ERX SY                                

                          CPT-4:                    2010             

   

 

                                                            FLU VACCINE 3 YRS & 

> IM UP 63                              

                          CPT-4: 82958                   10/06/2010             

   

 

                                                            ADMIN INFLUENZA VIRU

S VAC                                   

                          CPT-4:                    10/06/2010             

   

 

                                                            URINALYSIS NONAUTO W

/O SCOPE                                

                          CPT-4: 50740                   2010             

   

 

                                                            URINE CULTURE/ COLON

Y COUNT                                 

                          CPT-4: 89489                   2010             

   

 

                                                            PRESCRIP TRANSMIT VI

A ERX SY                                

                          CPT-4:                    2010             

   

 

                                                            THER/PROPH/DIAG INJ 

SC/IM                                   

                          CPT-4: 21622                   2010             

   

 

                                              VITAMIN B12 INJECTION             

                        

CPT-4:                             2010                

 

                                                            THER/PROPH/DIAG INJ 

SC/IM                                   

                          CPT-4: 64919                   2010             

   

 

                                              VITAMIN B12 INJECTION             

                        

CPT-4:                             2010                

 

                                              ROUTINE VENIPUNCTURE              

                       

CPT-4: 21734                            2010                



                                                                                
                                                                                
                                                                                
                                                                                
                                                                                
                                                                                
                                                                                
                                                                                
                                                                                
                                                                                
                                                                                
                                                                                
                                                                                
                                                                                
                                                                                
                                                                                
                                                                                
                                                                                
                                                                                
                                                           



Vital Signs

                      



                          Date                      Vital                

 

                          2019                                       Blood

 Pressure 1: 140/70 Code: 

8480-6                                                         Heart Rate 1: 57 

bpm                                                         SpO2: 99%           

                                                            Temperature: 35.9 (C

) / 96.6 (F)   

                                                            Weight: 215 lbs     

       

                      

 

                          06/10/2019                                       Blood

 Pressure 1: 144/70 Code: 

8480-6                                                         Heart Rate 1: 60 

bpm                                                         Respiratory Rate: 20

bpm                                                         SpO2: 95%           

                                                            Temperature: 36.4 (C

) / 97.6 (F)   

                                                            Weight: 214 lbs     

       

                      

 

                          2019                                       Blood

 Pressure 1: 128/78 Code: 

8480-6                                                         Heart Rate 1: 56 

bpm                                                         Respiratory Rate: 20

bpm                                                         SpO2: 98%           

                                                            Temperature: 36.3 (C

) / 97.4 (F)   

                                                            Weight: 213 lbs     

       

                      

 

                          2018                                       Blood

 Pressure 1: 142/60 Code: 

8480-6                                                         BMI: 38.2 Code: 

87757-2                                                         Heart Rate 1: 48

bpm                                                         Height: 5'2"        

                                                            Respiratory Rate: 20

 bpm        

                                                            SpO2: 98%           

            

                                                            Temperature: 36.7 (C

) / 98.1 (F)               

                                                            Weight: 212 lbs     

                   

          

 

                          2018                                       Blood

 Pressure 1: 142/64 Code: 

8480-6                                                         BMI: 38.5 Code: 

08993-1                                                         Heart Rate 1: 52

bpm                                                         Height: 5'2"        

                                                            Respiratory Rate: 22

 bpm        

                                                            SpO2: 96%           

            

                                                            Temperature: 36.1 (C

) / 96.9 (F)               

                                                            Weight: 214 lbs     

                   

          

 

                          10/02/2018                                       Blood

 Pressure 1: 124/80 Code: 

8480-6                                                         BMI: 38.3 Code: 

28038-8                                                         Heart Rate 1: 68

bpm                                                         Height: 5'2"        

                                                            Respiratory Rate: 20

 bpm        

                                                            Temperature: 36.3 (C

) / 97.4 (F)

                                                            Weight: 213 lbs     

    

                         

 

                          2018                                       Blood

 Pressure 1: 132/78 Code: 

8480-6                                                         BMI: 37.6 Code: 

55243-4                                                         Heart Rate 1: 68

bpm                                                         Height: 5'2"        

                                                            Respiratory Rate: 20

 bpm        

                                                            SpO2: 97%           

            

                                                            Temperature: 36.8 (C

) / 98.2 (F)               

                                                            Weight: 209 lbs     

                   

          

 

                          2018                                       Blood

 Pressure 1: 150/76 Code: 

8480-6                                                         BMI: 38.5 Code: 

10929-8                                                         Heart Rate 1: 64

bpm                                                         Height: 5'2"        

                                                            Respiratory Rate: 20

 bpm        

                                                            SpO2: 97%           

            

                                                            Temperature: 36.2 (C

) / 97.2 (F)               

                                                            Weight: 214 lbs     

                   

          

 

                          2018                                       Blood

 Pressure 1: 122/74 Code: 

8480-6                                                         BMI: 38.2 Code: 

55288-8                                                         Heart Rate 1: 64

bpm                                                         Height: 5'2"        

                                                            Respiratory Rate: 18

 bpm        

                                                            SpO2: 96%           

            

                                                            Temperature: 35.8 (C

) / 96.4 (F)               

                                                            Weight: 212 lbs     

                   

          

 

                          2018                                       Blood

 Pressure 1: 124/78 Code: 

8480-6                                                         BMI: 37.8 Code: 

75622-1                                                         Heart Rate 1: 76

bpm                                                         Height: 5'2"        

                                                            Respiratory Rate: 20

 bpm        

                                                            Temperature: 36.8 (C

) / 98.3 (F)

                                                            Weight: 210 lbs     

    

                         

 

                          2018                                       Blood

 Pressure 1: 136/70 Code: 

8480-6                                                         BMI: 38.0 Code: 

41743-5                                                         Heart Rate 1: 68

bpm                                                         Height: 5'2"        

                                                            Respiratory Rate: 20

 bpm        

                                                            SpO2: 97%           

            

                                                            Temperature: 36.8 (C

) / 98.2 (F)               

                                                            Weight: 211 lbs     

                   

          

 

                          2018                                       Blood

 Pressure 1: 140/65 Code: 

8480-6                                                         Heart Rate 1: 75 

bpm                                                         Respiratory Rate: 24

bpm                                                         SpO2: 95%           

                                                            Temperature: 37.0 (C

) / 98.6 (F)   

                                                            Weight: 211 lbs     

       

                      

 

                          2018                                       Blood

 Pressure 1: 154/70 Code: 

8480-6                                                         BMI: 37.6 Code: 

27638-6                                                         Heart Rate 1: 76

bpm                                                         Height: 5'2"        

                                                            Respiratory Rate: 20

 bpm        

                                                            SpO2: 98%           

            

                                                            Temperature: 36.9 (C

) / 98.5 (F)               

                                                            Weight: 209 lbs     

                   

          

 

                          2017                                       Blood

 Pressure 1: 156/70 Code: 

8480-6                                                         BMI: 37.1 Code: 

71681-6                                                         Heart Rate 1: 72

bpm                                                         Height: 5'2"        

                                                            Respiratory Rate: 20

 bpm        

                                                            SpO2: 97%           

            

                                                            Temperature: 37.0 (C

) / 98.6 (F)               

                                                            Weight: 206 lbs     

                   

          

 

                          2017                                       Blood

 Pressure 1: 152/78 Code: 

8480-6                                                         BMI: 36.8 Code: 

56261-6                                                         Heart Rate 1: 78

bpm                                                         Height: 5'2"        

                                                            Respiratory Rate: 20

 bpm        

                                                            SpO2: 98%           

            

                                                            Temperature: 36.1 (C

) / 97.0 (F)               

                                                            Weight: 204 lbs     

                   

          

 

                          2017                                       Blood

 Pressure 1: 142/70 Code: 

8480-6                                                         BMI: 36.9 Code: 

99667-1                                                         Heart Rate 1: 64

bpm                                                         Height: 5'2"        

                                                            Respiratory Rate: 20

 bpm        

                                                            SpO2: 96%           

            

                                                            Temperature: 36.5 (C

) / 97.7 (F)               

                                                            Weight: 205 lbs     

                   

          

 

                          2017                                       Blood

 Pressure 1: 142/68 Code: 

8480-6                                                         Heart Rate 1: 88 

bpm                                                         Respiratory Rate: 18

bpm                                                         SpO2: 98%           

                                                            Temperature: 36.3 (C

) / 97.3 (F)   

                                                            Weight: 209 lbs     

       

                      

 

                          2016                                       Blood

 Pressure 1: 130/78 Code: 

8480-6                                                         Heart Rate 1: 68 

bpm                                                         Respiratory Rate: 20

bpm                                                         SpO2: 95%           

                                                            Temperature: 36.4 (C

) / 97.6 (F)   

                                                            Weight: 212 lbs     

       

                      

 

                          2016                                       Blood

 Pressure 1: 122/64 Code: 

8480-6                                                         BMI: 39.1 Code: 

04137-5                                                         Heart Rate 1: 76

bpm                                                         Height: 5'2"        

                                                            Respiratory Rate: 20

 bpm        

                                                            Temperature: 36.8 (C

) / 98.2 (F)

                                                            Weight: 217 lbs     

    

                         

 

                          2016                                       Blood

 Pressure 1: 144/70 Code: 

8480-6                                                         BMI: 39.4 Code: 

95201-9                                                         Heart Rate 1: 76

bpm                                                         Height: 5'2"        

                                                            Respiratory Rate: 20

 bpm        

                                                            Temperature: 36.6 (C

) / 97.9 (F)

                                                            Weight: 219 lbs     

    

                         

 

                          2015                                       Blood

 Pressure 1: 152/60 Code: 

8480-6                                                         BMI: 39.6 Code: 

26370-9                                                         Heart Rate 1: 84

bpm                                                         Height: 5'2"        

                                                            Respiratory Rate: 20

 bpm        

                                                            Temperature: 37.0 (C

) / 98.6 (F)

                                                            Weight: 220 lbs     

    

                         

 

                          2015                                       Blood

 Pressure 1: 146/76 Code: 

8480-6                                                         BMI: 39.8 Code: 

30343-8                                                         Heart Rate 1: 88

bpm                                                         Height: 5'2"        

                                                            Respiratory Rate: 20

 bpm        

                                                            Temperature: 37.0 (C

) / 98.6 (F)

                                                            Weight: 221 lbs     

    

                         

 

                          2015                                       Blood

 Pressure 1: 132/70 Code: 

8480-6                                                         BMI: 39.1 Code: 

47158-2                                                         Heart Rate 1: 88

bpm                                                         Height: 5'2"        

                                                            Respiratory Rate: 20

 bpm        

                                                            Temperature: 36.4 (C

) / 97.6 (F)

                                                            Weight: 217 lbs     

    

                         

 

                          2015                                       Blood

 Pressure 1: 132/66 Code: 

8480-6                                                         BMI: 39.9 Code: 

23898-7                                                         Heart Rate 1: 72

bpm                                                         Height: 5'2"        

                                                            Respiratory Rate: 20

 bpm        

                                                            Temperature: 36.9 (C

) / 98.4 (F)

                                                            Weight: 218 lbs     

    

                         

 

                          2015                                       Blood

 Pressure 1: 136/80 Code: 

8480-6                                                         Heart Rate 1: 76 

bpm                                                         Respiratory Rate: 20

bpm                                                         Temperature: 36.7 

(C) / 98.0 (F)                                                         Weight: 

224 lbs                                   

 

                          2015                                       Blood

 Pressure 1: 134/78 Code: 

8480-6                                                         BMI: 39.7 Code: 

65912-8                                                         Heart Rate 1: 84

bpm                                                         Height: 5'2"        

                                                            Respiratory Rate: 20

 bpm        

                                                            Temperature: 36.7 (C

) / 98.0 (F)

                                                            Weight: 217 lbs     

    

                         

 

                          2014                                       Blood

 Pressure 1: 146/78 Code: 

8480-6                                                         BMI: 39.5 Code: 

97209-1                                                         Heart Rate 1: 82

bpm                                                         Height: 5'2"        

                                                            Respiratory Rate: 18

 bpm        

                                                            Temperature: 35.6 (C

) / 96.1 (F)

                                                            Weight: 216 lbs     

    

                         

 

                          2014                                       Blood

 Pressure 1: 134/70 Code: 

8480-6                                                         BMI: 37.9 Code: 

59698-2                                                         Heart Rate 1: 80

bpm                                                         Height: 5'3"        

                                                            Respiratory Rate: 20

 bpm        

                                                            Temperature: 36.8 (C

) / 98.2 (F)

                                                            Weight: 214 lbs     

    

                         

 

                          2014                                       Blood

 Pressure 1: 132/70 Code: 

8480-6                                                         BMI: 37.6 Code: 

41879-5                                                         Heart Rate 1: 80

bpm                                                         Height: 5'3"        

                                                            Respiratory Rate: 20

 bpm        

                                                            Temperature: 36.8 (C

) / 98.3 (F)

                                                            Weight: 212 lbs     

    

                         

 

                          2014                                       Blood

 Pressure 1: 116/74 Code: 

8480-6                                                         Heart Rate 1: 68 

bpm                                                         Respiratory Rate: 20

bpm                                                         Temperature: 36.2 

(C) / 97.1 (F)                                                         Weight: 

212 lbs                                   

 

                          10/15/2013                                       Blood

 Pressure 1: 132/82 Code: 

8480-6                                                         BMI: 37.4 Code: 

95272-2                                                         Heart Rate 1: 76

bpm                                                         Height: 5'3"        

                                                            Respiratory Rate: 20

 bpm        

                                                            Temperature: 36.7 (C

) / 98.0 (F)

                                                            Weight: 211 lbs     

    

                         

 

                          2013                                       Blood

 Pressure 1: 130/76 Code: 

8480-6                                                         Heart Rate 1: 78 

bpm                                  

 

                          2013                                       Blood

 Pressure 1: 140/82 Code: 

8480-6                                                         BMI: 36.8 Code: 

09605-8                                                         Heart Rate 1: 66

bpm                                                         Height: 5'3"        

                                                            Respiratory Rate: 20

 bpm        

                                                            Temperature: 36.1 (C

) / 96.9 (F)

                                                            Weight: 208 lbs     

    

                         

 

                          2013                                       Blood

 Pressure 1: 138/80 Code: 

8480-6                                                         BMI: 36.4 Code: 

74374-2                                                         Heart Rate 1: 72

bpm                                                         Height: 5'4"        

                                                            Respiratory Rate: 20

 bpm        

                                                            Temperature: 36.7 (C

) / 98.0 (F)

                                                            Weight: 212 lbs     

    

                         

 

                          2013                                       Blood

 Pressure 1: 124/70 Code: 

8480-6                                                         BMI: 36.9 Code: 

88381-7                                                         Heart Rate 1: 60

bpm                                                         Height: 5'4"        

                                                            Temperature: 36.1 (C

) / 97.0 (F)

                                                            Weight: 215 lbs     

    

                         

 

                          05/10/2013                                       Blood

 Pressure 1: 132/86 Code: 

8480-6                                                         BMI: 36.9 Code: 

80868-6                                                         Heart Rate 1: 76

bpm                                                         Height: 5'4"        

                                                            Respiratory Rate: 20

 bpm        

                                                            Temperature: 36.8 (C

) / 98.2 (F)

                                                            Weight: 215 lbs     

    

                         

 

                          2013                                       Blood

 Pressure 1: 134/82 Code: 

8480-6                                                         BMI: 36.6 Code: 

11232-3                                                         Heart Rate 1: 72

bpm                                                         Height: 5'4"        

                                                            Respiratory Rate: 20

 bpm        

                                                            Temperature: 36.3 (C

) / 97.4 (F)

                                                            Weight: 213 lbs     

    

                         

 

                          2012                                       Blood

 Pressure 1: 142/80 Code: 

8480-6                                                         BMI: 37.1 Code: 

86758-6                                                         Heart Rate 1: 76

bpm                                                         Height: 5'4"        

                                                            Respiratory Rate: 20

 bpm        

                                                            Temperature: 36.8 (C

) / 98.3 (F)

                                                            Weight: 216 lbs     

    

                         

 

                          10/23/2012                                       Blood

 Pressure 1: 128/68 Code: 

8480-6                                                         BMI: 37.6 Code: 

81179-9                                                         Heart Rate 1: 72

bpm                                                         Height: 5'4"        

                                                            Respiratory Rate: 20

 bpm        

                                                            Temperature: 36.6 (C

) / 97.9 (F)

                                                            Weight: 219 lbs     

    

                         

 

                          10/10/2012                                       Blood

 Pressure 1: 122/70 Code: 

8480-6                                                         Heart Rate 1: 76 

bpm                                                         Respiratory Rate: 20

bpm                                                         Temperature: 36.7 

(C) / 98.1 (F)                                                         Weight: 

218 lbs                                   

 

                          2012                                       Blood

 Pressure 1: 132/80 Code: 

8480-6                                                         Heart Rate 1: 84 

bpm                                  

 

                          2012                                       Blood

 Pressure 1: 128/78 Code: 

8480-6                                                         BMI: 38.1 Code: 

22970-2                                                         Heart Rate 1: 84

bpm                                                         Height: 5'4"        

                                                            Respiratory Rate: 20

 bpm        

                                                            Temperature: 36.9 (C

) / 98.4 (F)

                                                            Weight: 222 lbs     

    

                         

 

                          2012                                       Blood

 Pressure 1: 138/80 Code: 

8480-6                                                         BMI: 37.9 Code: 

34996-6                                                         Heart Rate 1: 74

bpm                                                         Height: 5'4"        

                                                            Temperature: 36.1 (C

) / 97.0 (F)

                                                            Weight: 221 lbs     

    

                         

 

                          2012                                       Blood

 Pressure 1: 126/78 Code: 

8480-6                                                         BMI: 38.4 Code: 

12213-8                                                         Heart Rate 1: 72

bpm                                                         Height: 5'4"        

                                                            Respiratory Rate: 20

 bpm        

                                                            Temperature: 36.7 (C

) / 98.0 (F)

                                                            Weight: 224 lbs     

    

                         

 

                          2012                                       Blood

 Pressure 1: 138/72 Code: 

8480-6                                                         BMI: 38.4 Code: 

65270-1                                                         Heart Rate 1: 72

bpm                                                         Height: 5'4"        

                                                            Respiratory Rate: 20

 bpm        

                                                            Temperature: 36.6 (C

) / 97.9 (F)

                                                            Weight: 224 lbs     

    

                         

 

                          2012                                       Blood

 Pressure 1: 122/78 Code: 

8480-6                                                         BMI: 38.8 Code: 

26327-4                                                         Heart Rate 1: 88

bpm                                                         Height: 5'4"        

                                                            Respiratory Rate: 20

 bpm        

                                                            Temperature: 36.6 (C

) / 97.8 (F)

                                                            Weight: 226 lbs     

    

                         

 

                          2012                                       Blood

 Pressure 1: 116/60 Code: 

8480-6                                                         BMI: 38.1 Code: 

89379-7                                                         Heart Rate 1: 92

bpm                                                         Height: 5'4"        

                                                            Respiratory Rate: 20

 bpm        

                                                            Temperature: 36.8 (C

) / 98.2 (F)

                                                            Weight: 222 lbs     

    

                         

 

                          2011                                       Blood

 Pressure 1: 118/62 Code: 

8480-6                                                         BMI: 37.9 Code: 

63626-0                                                         Heart Rate 1: 80

bpm                                                         Height: 5'4"        

                                                            Temperature: 36.5 (C

) / 97.7 (F)

                                                            Weight: 221 lbs     

    

                         

 

                          2011                                       Blood

 Pressure 1: 132/70 Code: 

8480-6                                                         Heart Rate 1: 84 

bpm                                                         Respiratory Rate: 20

bpm                                                         Temperature: 36.7 

(C) / 98.0 (F)                                                         Weight: 

221 lbs                                   

 

                          2011                                       Blood

 Pressure 1: 114/72 Code: 

8480-6                                                         BMI: 37.6 Code: 

51193-0                                                         Heart Rate 1: 76

bpm                                                         Height: 5'4"        

                                                            Respiratory Rate: 20

 bpm        

                                                            Temperature: 36.8 (C

) / 98.3 (F)

                                                            Weight: 219 lbs     

    

                         

 

                          2011                                       Blood

 Pressure 1: 136/76 Code: 

8480-6                                                         Temperature: 36.0

(C) / 96.8 (F)                                                         Weight: 

219 lbs 8 oz                                  

 

                          2011                                       Blood

 Pressure 1: 128/80 Code: 

8480-6                                                         Heart Rate 1: 72 

bpm                                                         Temperature: 36.3 

(C) / 97.4 (F)                                                         Weight: 

218 lbs                                   

 

                          2011                                       Blood

 Pressure 1: 126/70 Code: 

8480-6                                                         Heart Rate 1: 72 

bpm                                                         Temperature: 36.7 

(C) / 98.0 (F)                                  

 

                          2010                                       Blood

 Pressure 1: 126/78 Code: 

8480-6                                                         Temperature: 36.2

(C) / 97.1 (F)                                                         Weight: 

217 lbs 8 oz                                  

 

                          2010                                       Blood

 Pressure 1: 116/70 Code: 

8480-6                                                         Heart Rate 1: 72 

bpm                                                         Temperature: 36.4 

(C) / 97.6 (F)                                                         Weight: 

222 lbs                                   

 

                          2010                                       Blood

 Pressure 1: 114/78 Code: 

8480-6                                                         Heart Rate 1: 72 

bpm                                                         Temperature: 37.1 

(C) / 98.8 (F)                                                         Weight: 

225 lbs                                   

 

                          2010                                       Blood

 Pressure 1: 128/78 Code: 

8480-6                                                         Heart Rate 1: 76 

bpm                                                         Temperature: 36.6 

(C) / 97.8 (F)                                                         Weight: 

224 lbs                                   

 

                          2010                                       Blood

 Pressure 1: 116/78 Code: 

8480-6                                                         Heart Rate 1: 80 

bpm                                                         Temperature: 36.4 

(C) / 97.6 (F)                                                         Weight: 

226 lbs                                   

 

                          2010                                       Blood

 Pressure 1: 120/70 Code: 

8480-6                                                         BMI: 38.3 Code: 

64555-8                                                         Heart Rate 1: 88

bpm                                                         Height: 5'5"        

                                                            Temperature: 36.4 (C

) / 97.6 (F)

                                                            Weight: 230 lbs     

    

                         



                                                                                
                                                                                
                                                                                
                                                                                
                                                                                
                                                                                
                                                                                
                                                                                
                    



Functional Status

          No Functional Status data                                             
            



History of Present Illness

                      



                    Symptom Name                   Status                   Resu

lt                  

                          Effective Date                   Notes                

 

                                   Quality                   chronic            

       

06/10/2019                              None                

 

                                   Quality                   stable             

      06/10/2019

                                        None                

 

                                   Quality                   chronic            

       

06/10/2019                              None                

 

                                   Quality                   hesitancy          

         

2019                              None                

 

                                   Quality                   stable             

      2019

                                        None                

 

                                   Quality                   acute              

     2019 

                                        None                

 

                                   Quality                   improving          

         

2019                              back on omeprazole--finished all of H py

saran regimen

but had pain in swelling in feet and hands with protonix                

 

                    gastroesophageal reflux                   Quality           

        

regurgitation of acid                   2018                   None       

        

 

                    gastroesophageal reflux                   Quality           

        chronic     

                          2018                   None                

 

                    chest pain/pressure                   Location              

     in the 

substernal area                   2018                   None             

  

 

                    chest pain/pressure                   Quality               

    stable          

                          2018                   None                

 

                          gastroesophageal reflux                   Onset and Re

solution                  

                    ongoing                   2018                   None 

               

 

                anxiety                   Quality                   acute       

            

2018                              None                

 

                anxiety                   Quality                   agitation   

                

2018                              None                

 

                    anxiety                   Onset and Resolution              

     ongoing        

                          2018                   None                

 

                arrhythmia                   Quality                   acute    

               

2018                              None                

 

                    arrhythmia                   Quality                   inter

mittent             

                          2018                   None                

 

                    arrhythmia                   Onset and Resolution           

        ongoing     

                          2018                   None                

 

                    gastroesophageal reflux                   Quality           

        acute       

                          2018                   None                

 

                    gastroesophageal reflux                   Quality           

        

regurgitation of acid                   2018                   None       

        

 

                          gastroesophageal reflux                   Onset and Re

solution                  

                    ongoing                   2018                   None 

               

 

                    gastroesophageal reflux                   Onset of Symptom  

                 

during adulthood                   2018                   None            

   

 

                    hypertension                   Onset and Resolution         

          ongoing   

                          2018                   None                

 

                    abdominal pain                   Location                   

in the epigastric 

area                      10/02/2018                   None                

 

                    abdominal pain                   Quality                   i

mproving            

                          10/02/2018                   None                

 

                    arrhythmia                   Quality                   irreg

ular beats          

                          10/02/2018                   told holter showed episod

e of atrial 

fibrillation so has to get a stress test done                

 

                    stress                   Exacerbating Factors               

    stressful life 

changes                   10/02/2018                   None                

 

                    anxiety                   Onset and Resolution              

     ongoing        

                          10/02/2018                   None                

 

                fatigue                   Quality                   chronic     

              

10/02/2018                              None                

 

                fatigue                   Quality                   constant    

               

10/02/2018                              None                

 

                    fatigue                   Onset and Resolution              

     ongoing        

                          10/02/2018                   None                

 

                    gastroesophageal reflux                   Quality           

        chronic     

                          2018                   None                

 

                          gastroesophageal reflux                   Onset and Re

solution                  

                    ongoing                   2018                   None 

               

 

                    abdominal pain                   Location                   

in the periumbilical

area                      2018                   None                

 

                    abdominal pain                   Location                   

in the epigastric 

area                      2018                   None                

 

                    abdominal pain                   Quality                   b

urning.             

                          2018                   Patient was in ER on  and was started 

on carafate 1gm QID                

 

                    muscle weakness                   Location                  

 diffusely          

                          2018                   None                

 

                    muscle weakness                   Quality                   

clumsiness          

                          2018                   None                

 

                    muscle weakness                   Quality                   

worsening           

                          2018                   None                

 

                    muscle weakness                   Quality                   

fatigue             

                          2018                   None                

 

                    hypertension                   Quality                   wor

sening              

                          2018                   None                

 

                    headache                   Location                   diffus

ely                 

                          2018                   None                

 

                    dizziness                   Quality                   lighth

eadedness           

                          2018                   None                

 

                    dizziness                   Quality                   [facility]

nce                 

                          2018                   None                

 

                    dizziness                   Onset and Resolution            

       ongoing      

                          2018                   None                

 

                    dizziness                   Onset of Symptom                

   1 1/2 months ago 

                          2018                   None                

 

                    muscle weakness                   Onset and Resolution      

             ongoing

                          2018                   None                

 

                    muscle weakness                   Onset of Symptom          

         1 1/2 

months ago                   2018                   None                

 

                    hypoglycemia                   Quality                   int

ermittent           

                          2018                   None                

 

                    neck pain                   Location                   in th

e posterior area    

                          2018                   None                

 

                    neck pain                   Quality                   improv

ing.                

                          2018                   Patient is still going to

 PT twice weekly and 

home exercises                

 

                    neck pain                   Location                   on th

e right             

                          2018                   None                

 

                    dyspnea                   Quality                   shortnes

s of breath         

                          2018                   None                

 

                    dyspnea                   Quality                   breathle

ssness              

                          2018                   None                

 

                    back pain                   Location                   in th

e right upper back 

area                      2018                   None                

 

                    back pain                   Onset and Resolution            

       ongoing      

                          2018                   None                

 

                back pain                   Quality                   dull      

             

2018                              None                

 

                    back pain                   Quality                   consta

nt                  

                          2018                   None                

 

                    back pain                   Quality                   discom

fort                

                          2018                   None                

 

                    muscle weakness                   Location                  

 diffusely          

                          2018                   None                

 

                    muscle weakness                   Quality                   

lower extremities   

                          2018                   None                

 

                    muscle weakness                   Quality                   

fatigue             

                          2018                   None                

 

                    muscle weakness                   Quality                   

clumsiness          

                          2018                   None                

 

                    muscle weakness                   Quality                   

worsening           

                          2018                   None                

 

                    otalgia                   Location                   on both

 sides              

                          2018                   None                

 

                otalgia                   Quality                   acute       

            

2018                              None                

 

                    otalgia                   Onset and Resolution              

     sudden in onset

                          2018                   None                

 

                    back pain                   Location                   in th

e left lower back 

area                      2018                   None                

 

                    back pain                   Quality                   discom

fort                

                          2018                   None                

 

                    back pain                   Radiating                   the 

inguinal area       

                          2018                   None                

 

                    back pain                   Radiating                   into

 left hip           

                          2018                   None                

 

                    back pain                   Onset of Symptom                

   1 weeks ago      

                          2018                   None                

 

                back pain                   Quality                   sharp     

              

2018                              None                

 

                    back pain                   Quality                   consta

nt                  

                          2018                   None                

 

                back pain                   Quality                   dull      

             

2017                              None                

 

                    back pain                   Quality                   improv

ing.                

                          2017                   Patient has finished PT a

nd continues to do home 

exercises                

 

                back pain                   Quality                   stable    

               

2017                              None                

 

                    back pain                   Location                   lumba

r spine             

                          2017                   None                

 

                    gait abnormality                   Quality                  

 unsteady           

                          2017                   None                

 

                    gait abnormality                   Quality                  

 improving          

                          2017                   None                

 

                    pelvic pain                   Location                   on 

the left            

                          2017                   None                

 

                pelvic pain                   Quality                   acute   

                

2017                              None                

 

                    pelvic pain                   Quality                   achi

ng                  

                          2017                   None                

 

                    pelvic pain                   Quality                   coli

cky                 

                          2017                   None                

 

                    pelvic pain                   Quality                   heav

iness               

                          2017                   None                

 

                    pelvic pain                   Onset and Resolution          

         gradual in 

onset                     2017                   None                

 

                    pelvic pain                   Onset of Symptom              

     2 months ago   

                          2017                   None                

 

                    hyperglycemia                   Quality                   gl

ucose intolerance   

                          2017                   None                

 

                    hyperglycemia                   Quality                   wo

rsening.            

                          2017                   Blood sugars have increas

ed into 110-120's   

            

 

                    hyperglycemia                   Onset of Symptom            

       2-3 months 

ago                       2017                   None                

 

                    breast complaint                   Location                 

  in the left       

                          2017                   None                

 

                    breast complaint                   Location                 

  in the right      

                          2017                   None                

 

                    breast complaint                   Quality                  

 tenderness         

                          2017                   None                

 

                    cough                   Location                   in the th

roat                

                          2017                   None                

 

                cough                   Quality                   acute         

          

2017                              None                

 

                cough                   Quality                   dry           

        

2017                              None                

 

                cough                   Quality                   hacking       

            

2017                              None                

 

                cough                   Quality                   tight         

          

2017                              None                

 

                    cough                   Onset of Symptom                   1

-2 days ago         

                          2017                   None                

 

                    otalgia                   Location                   on both

 sides              

                          2017                   None                

 

                otalgia                   Quality                   acute       

            

2017                              None                

 

                otalgia                   Quality                   throbbing   

                

2017                              None                

 

                    otalgia                   Onset and Resolution              

     sudden in onset

                          2017                   None                

 

                    otalgia                   Onset of Symptom                  

 3-5 days ago       

                          2017                   None                

 

                    chest tightness                   Location                  

 diffusely          

                          2017                   None                

 

                    chest tightness                   Quality                   

acute               

                          2017                   None                

 

                    chest tightness                   Quality                   

dull                

                          2017                   None                

 

                    chest tightness                   Quality                   

pleuritic           

                          2017                   None                

 

                    chest tightness                   Quality                   

piercing            

                          2017                   None                

 

                    chest tightness                   Onset and Resolution      

             sudden 

in onset                   2017                   None                

 

                    chest tightness                   Onset of Symptom          

         3-5 days 

ago                       2017                   None                

 

                cough                   Quality                   productive    

               

2017                              None                

 

                    nasal discharge                   Location                  

 in both  nares     

                          2017                   None                

 

                    nasal discharge                   Quality                   

acute               

                          2017                   None                

 

                    nasal discharge                   Quality                   

green               

                          2017                   None                

 

                    nasal discharge                   Quality                   

thick               

                          2017                   None                

 

                    nasal discharge                   Quality                   

worsening           

                          2017                   None                

 

                    nasal discharge                   Onset of Symptom          

         _ days ago 

                          2017                   None                

 

                    sinus congestion                   Quality                  

 acute              

                          2017                   None                

 

                    sinus congestion                   Quality                  

 fullness           

                          2017                   None                

 

                    sinus congestion                   Quality                  

 pain               

                          2017                   None                

 

                    sinus congestion                   Quality                  

 pressure           

                          2017                   None                

 

                    sinus congestion                   Onset of Symptom         

          _ days ago

                          2017                   None                

 

                    otalgia                   Location                   on the 

right               

                          2016                   None                

 

                otalgia                   Quality                   throbbing   

                

2016                              None                

 

                    otalgia                   Onset and Resolution              

     gradual in 

onset                     2016                   None                

 

                    otalgia                   Onset of Symptom                  

 1 weeks ago        

                          2016                   None                

 

                    otalgia                   Onset and Resolution              

     ongoing        

                          2016                   Has been using loratadine

 and flonase    

           

 

                otalgia                   Quality                   acute       

            

2016                              None                

 

                    otalgia                   Onset and Resolution              

     sudden in onset

                          2016                   None                

 

                    constipation                   Quality                   doc

ry 2-3 days         

                          2016                   None                

 

                    constipation                   Quality                   sma

ll stools           

                          2016                   None                

 

                constipation                   Quality                   hard   

                

2016                              None                

 

                    constipation                   Onset and Resolution         

          ongoing   

                          2016                   None                

 

                    constipation                   Alleviating Factors          

         medication.

                          2016                   Has tried various OTC chase

atments 

with very little releif                

 

                    constipation                   Alleviating Factors          

         diet 

changes                   2016                   None                

 

                    flank pain                   Location                   on b

oth sides           

                          2016                   None                

 

                    flank pain                   Quality                   stabb

ing                 

                          2016                   None                

 

                flank pain                   Quality                   sharp    

               

2016                              None                

 

                    flank pain                   Onset and Resolution           

        resolved    

                          2016                   Only had the one episode 

1 week ago  

             

 

                    back pain                   Location                   in th

e low back          

                          2016                   None                

 

                back pain                   Quality                   dull      

             

2016                              None                

 

                back pain                   Quality                   chronic   

                

2016                              None                

 

                    back pain                   Onset and Resolution            

       worse during 

the morning                   2016                   None                

 

                    hyperglycemia                   Quality                   gl

ucose intolerance   

                          2015                   None                

 

                    hyperglycemia                   Quality                   st

able                

                          2015                   None                

 

                    otalgia                   Location                   on both

 sides              

                          2015                   None                

 

                otalgia                   Quality                   dull        

           

2015                              None                

 

                    dizziness                   Quality                   interm

ittent              

                          2015                   None                

 

                    dizziness                   Quality                   lighth

eadedness           

                          2015                   None                

 

                    dizziness                   Quality                   imbala

nce                 

                          2015                   None                

 

                    hypertension                   Quality                   chr

onic                

                          2015                   None                

 

                    hypertension                   Quality                   sta

ble                 

                          2015                   None                

 

                otalgia                   Quality                   acute       

            

2015                              None                

 

                    otalgia                   Onset and Resolution              

     sudden in onset

                          2015                   None                

 

                    otalgia                   Onset and Resolution              

     ongoing        

                          2015                   None                

 

                    hypertension                   Onset and Resolution         

          ongoing   

                          2015                   None                

 

                    hypertension                   Onset of Symptom             

      during 

adulthood                   2015                   None                

 

                    hypertension                   Exacerbating Factors         

          stress    

                          2015                   None                

 

                    hypertension                   Alleviating Factors          

         medication 

                          2015                   None                

 

                    hyperglycemia                   Onset of Symptom            

       during 

adulthood                   2015                   None                

 

                otalgia                   Severity                   moderate   

                

2015                              None                

 

                    otalgia                   Triggers                   positio

n change            

                          2015                   None                

 

                    dizziness                   Onset and Resolution            

       ongoing      

                          2015                   None                

 

                    dizziness                   Quality                   rotati

on                  

                          2015                   None                

 

                    dizziness                   Quality                   loss o

f balance           

                          2015                   None                

 

                    dizziness                   Quality                   sense 

of room spinning    

                          2015                   None                

 

                dizziness                   Quality                   vertigo   

                

2015                              None                

 

                    dizziness                   Quality                   worsen

ing                 

                          2015                   None                

 

                    nasal allergies                   Location                  

 in both  nares     

                          2015                   None                

 

                    ataxia                   Quality                   feeling o

f unsteadiness with 

walking                   2015                   None                

 

                    ataxia                   Onset and Resolution               

    ongoing         

                          2015                   None                

 

                ataxia                   Quality                   worsening    

               

2015                              None                

 

                    ataxia                   Onset of Symptom                   

during adulthood    

                          2015                   None                

 

                    ataxia                   Limitation on Activities           

        necessitates

ambulation with a cane or walker                   2015                   

to make sure doesn't fall                

 

                    ataxia                   Frequency of Episodes              

     increasing     

                          2015                   None                

 

                ataxia                   Triggers                   activity    

               

2015                              has became more sedentary due to fear of

 falling   

            

 

                    muscle weakness                   Location                  

 diffusely          

                          2015                   None                

 

                    muscle weakness                   Quality                   

lower extremities   

                          2015                   None                

 

                    muscle weakness                   Quality                   

fatigue             

                          2015                   None                

 

                    muscle weakness                   Quality                   

worsening           

                          2015                   None                

 

                    muscle weakness                   Quality                   

clumsiness          

                          2015                   None                

 

                    muscle weakness                   Onset and Resolution      

             ongoing

                          2015                   None                

 

                fatigue                   Quality                   worsening   

                

2015                               passed away in July             

   

 

                    fatigue                   Quality                   low ener

gy                  

                          2015                   None                

 

                    muscle weakness                   Location                  

 diffusely          

                          2015                   None                

 

                    muscle weakness                   Quality                   

intermittent        

                          2015                   None                

 

                    muscle weakness                   Quality                   

fatigue             

                          2015                   None                

 

                    muscle weakness                   Onset and Resolution      

             ongoing

                          2015                   None                

 

                    otalgia                   Location                   on the 

right               

                          2015                   None                

 

                otalgia                   Quality                   acute       

            

2015                              None                

 

                otalgia                   Quality                   pressure    

               

2015                              None                

 

                    otalgia                   Quality                   uncomfor

table               

                          2015                   None                

 

                    otalgia                   Onset of Symptom                  

 2 weeks ago        

                          2015                   None                

 

                dizziness                   Quality                   acute     

              

2015                              None                

 

                    dizziness                   Quality                   feelin

gs of unsteadiness  

                          2015                   None                

 

                    dizziness                   Onset of Symptom                

   2 weeks ago      

                          2015                   None                

 

                    pain, limb                   Location                   on t

he right lower leg  

                          2015                   None                

 

                    pain, limb                   Quality                   inter

mittent             

                          2015                   None                

 

                    pain, limb                   Onset and Resolution           

        ongoing     

                          2015                   None                

 

                pain, limb                   Quality                   dull     

              

2015                              None                

 

                    pain, limb                   Onset of Symptom               

    2 weeks ago     

                          2015                   None                

 

                    pain, limb                   Quality                   worse

rhoda                

                          2015                   None                

 

                pain, limb                   Quality                   acute    

               

2015                              None                

 

                    pain, limb                   Pertinent Findings             

      Denies 

swelling                   2015                   None                

 

                    pain, limb                   Pertinent Findings             

      Denies warmth 

                          2015                   None                

 

                    pain, limb                   Pertinent Findings             

      muscle 

tenderness                   2015                   None                

 

                    pain, limb                   Pertinent Findings             

      Denies focal 

weakness                   2015                   None                

 

                    pain, limb                   Pertinent Findings             

      Denies 

erythema                   2015                   None                

 

                    chest pain/pressure                   Location              

     in the 

substernal area                   2015                   None             

  

 

                    chest pain/pressure                   Radiating             

      the back      

                          2015                   None                

 

                    chest pain/pressure                   Quality               

    improving       

                          2015                   None                

 

                    back pain                   Location                   [facility]

[facility]           

                          2015                   None                

 

                    back pain                   Quality                   improv

ing                 

                          2015                   None                

 

                    hyperglycemia                   Quality                   st

able                

                          2015                   Monitoring BS daily and r

anging 's         

      

 

                    hyperglycemia                   Quality                   pr

e-diabetic          

                          2015                   None                

 

                    hypertension                   Quality                   sta

ble                 

                          2015                   None                

 

                    hypertension                   Quality                   chr

onic                

                          2015                   None                

 

                    hyperlipidemia                   Quality                   s

table               

                          2015                   None                

 

                    sinusitis                   Location                   diffu

sely                

                          2014                   None                

 

                sinusitis                   Quality                   acute     

              

2014                              None                

 

                    sinusitis                   Quality                   fullne

ss                  

                          2014                   None                

 

                    sinusitis                   Onset of Symptom                

   2 days ago       

                          2014                   None                

 

                    cough                   Location                   in the th

roat                

                          2014                   None                

 

                cough                   Quality                   acute         

          

2014                              None                

 

                cough                   Quality                   dry           

        

2014                              None                

 

                cough                   Quality                   hacking       

            

2014                              None                

 

                    cough                   Onset of Symptom                   2

 weeks ago          

                          2014                   None                

 

                    otalgia                   Location                   on the 

right               

                          2014                   None                

 

                otalgia                   Quality                   acute       

            

2014                              None                

 

                otalgia                   Quality                   throbbing   

                

2014                              None                

 

                    otalgia                   Onset of Symptom                  

 1 week ago         

                          2014                   None                

 

                otalgia                   Quality                   pressure    

               

2014                              None                

 

                dizziness                   Quality                   acute     

              

2014                              None                

 

                    dizziness                   Quality                   feelin

gs of unsteadiness  

                          2014                   None                

 

                    dizziness                   Quality                   loss o

f balance           

                          2014                   None                

 

                    dizziness                   Onset of Symptom                

   1 week ago       

                          2014                   None                

 

                    muscle weakness                   Quality                   

lower extremities   

                          2014                   None                

 

                    muscle weakness                   Onset and Resolution      

             ongoing

                          2014                   Patient feels like it is 

related 

to amlodipine 2.5mg daily                

 

                    dizziness                   Quality                   sense 

of motion           

                          2014                   None                

 

                    dizziness                   Quality                   interm

ittent              

                          2014                   Inner ear issues---using 

flonase BID           

    

 

                cough                   Quality                   productive    

               

2014                              None                

 

                    back pain                   Location                   [facility]

[facility] spine           

                          2014                   None                

 

                back pain                   Quality                   aching    

               

2014                              None                

 

                sinusitis                   Quality                   pain      

             

2014                              None                

 

                    sinusitis                   Quality                   purule

nt                  

                          2014                   None                

 

                    sinusitis                   Quality                   fullne

ss                  

                          2014                   None                

 

                sinusitis                   Quality                   thick     

              

2014                              None                

 

                    sinusitis                   Quality                   pressu

re                  

                          2014                   None                

 

                    sinusitis                   Quality                   draina

ge                  

                          2014                   None                

 

                    sinusitis                   Onset and Resolution            

       ongoing      

                          2014                   Has been taking fluticaso

ne/claritin 

and also took leftover cefuroxime                

 

                    sinusitis                   Onset of Symptom                

   2 weeks ago      

                          2014                   None                

 

                    muscle weakness                   Quality                   

lower extremities   

                          2014                   None                

 

                    hypertension                   Onset and Resolution         

          ongoing   

                          2014                   None                

 

                    hypertension                   Onset of Symptom             

      during 

adulthood                   2014                   None                

 

                    hypertension                   Quality                   imp

roving              

                          2014                   None                

 

                    muscle weakness                   Location                  

 diffusely          

                          2014                   None                

 

                    muscle weakness                   Onset and Resolution      

             ongoing

                          2014                   None                

 

                    headache                   Location                   diffus

ely                 

                          2014                   None                

 

                headache                   Quality                   aching     

              

2014                              None                

 

                    headache                   Onset of Symptom                 

  3 days ago        

                          2014                   None                

 

                    cough                   Location                   in the th

roat                

                          2014                   None                

 

                cough                   Quality                   acute         

          

2014                              None                

 

                cough                   Quality                   productive    

               

2014                              Yellow sputum                

 

                    chest congestion                   Quality                  

 acute              

                          2014                   None                

 

                    chest congestion                   Quality                  

 painful            

                          2014                   None                

 

                    chest congestion                   Onset of Symptom         

          2 days ago

                          2014                   None                

 

                    sore throat                   Location                   dif

fusely              

                          2014                   None                

 

                    sore throat                   Quality                   achi

ng                  

                          2014                   None                

 

                    sore throat                   Onset of Symptom              

     2 days ago     

                          2014                   None                

 

                    cough                   Location                   in the 

roat                

                          10/15/2013                   None                

 

                cough                   Quality                   productive    

               

10/15/2013                              None                

 

                    chest congestion                   Quality                  

 thick/yellow 

secretions                   10/15/2013                   None                

 

                    chest congestion                   Onset of Symptom         

          2 days ago

                          10/15/2013                   Has been taking flonase a

nd 

claritin 1/2 tab daily                

 

                    myalgias                   Location                   diffus

ely                 

                          10/15/2013                   None                

 

                    hypertension                   Onset of Symptom             

      3 days ago    

                          2013                   None                

 

                    hypertension                   Quality                   acu

te                  

                          2013                   None                

 

                    hypertension                   Blood Pressure Values        

           Stage 

2:-179 mmHg / -109 mmHg                   2013              

                                        None                

 

                    headache                   Location                   in the

 occipital area     

                          2013                   None                

 

                headache                   Quality                   aching     

              

2013                              None                

 

                    headache                   Onset of Symptom                 

  3 days ago        

                          2013                   None                

 

                    lower leg pain                   Location                   

on the right        

                          2013                   None                

 

                    headache                   Onset and Resolution             

      resolved      

                          2013                   after got shot in ER     

           

 

                anxiety                   Quality                   chronic     

              

2013                              None                

 

                    anxiety                   Onset and Resolution              

     ongoing        

                          2013                   None                

 

                    dizziness                   Quality                   sense 

of room spinning    

                          2013                   None                

 

                    otalgia                   Location                   on the 

right               

                          2013                   None                

 

                    sinus pain                   Quality                   press

ure                 

                          2013                   None                

 

                    dizziness                   Onset of Symptom                

   1 weeks ago      

                          2013                   None                

 

                    headache                   Location                   diffus

ely                 

                          2013                   None                

 

                headache                   Quality                   acute      

             

2013                              None                

 

                    sinus pain                   Location                   diff

usely               

                          2013                   None                

 

                sinus pain                   Quality                   acute    

               

2013                              None                

 

                    follow up                   Illness                   sympto

ms improved         

                          2013                   None                

 

                    otalgia                   Location                   on both

 sides              

                          2013                   None                

 

                otalgia                   Quality                   stable      

             

2013                              None                

 

                    otalgia                   Onset and Resolution              

     resolved       

                          2013                   wants rechecked to see if

 does have hole

in eardrum                

 

                    otalgia                   Onset of Symptom                  

 4 days ago         

                          05/10/2013                   None                

 

                    sore throat                   Location                   on 

the right           

                          05/10/2013                   None                

 

                cough                   Quality                   dry           

        

05/10/2013                              None                

 

                    otalgia                   Location                   on the 

right               

                          05/10/2013                   hurts into throat, has tr

ied ear drops, pain 7-8

on 10 scale                

 

                    breast complaint                   Location                 

  in the left upper 

inner quadrant                   2013                   None              

 

 

                    breast complaint                   Quality                  

 pain               

                          2013                   None                

 

                    breast complaint                   Onset of Symptom         

          1 days ago

                          2013                   Hasn't had mammogram in m

any 

years                

 

                    abdominal pain                   Location                   

in the epigastric 

area                      2012                   None                

 

                    dyspepsia                   Quality                   heartb

urn                 

                          2012                   None                

 

                    dyspepsia                   Quality                   improv

ing                 

                          2012                   None                

 

                    dizziness                   Quality                   sense 

of room spinning    

                          2012                   None                

 

                    dizziness                   Onset of Symptom                

   4 days ago       

                          2012                   None                

 

                    otalgia                   Location                   on both

 sides              

                          2012                   None                

 

                    knee pain                   Location                   on [place]

th knees            

                          2012                   None                

 

                    abdominal pain                   Quality                   i

mproving            

                          2012                   None                

 

                    abdominal pain                   Quality                   i

mproving            

                          10/23/2012                   None                

 

                    gas and bloating                   Quality                  

 improving          

                          10/23/2012                   None                

 

                    hypertension                   Quality                   sta

ble                 

                          10/23/2012                   None                

 

                    abdominal pain                   Location                   

in the epigastric 

area                      10/10/2012                   None                

 

                    abdominal pain                   Onset and Resolution       

            ongoing 

                          10/10/2012                   Has been taking omeprazol

e 20mg 

BID with no relief                

 

                    gas and bloating                   Location                 

  diffusely         

                          10/10/2012                   None                

 

                    gas and bloating                   Quality                  

 intermittent       

                          10/10/2012                   None                

 

                    diarrhea                   Quality                   intermi

ttent               

                          10/10/2012                   None                

 

                    nausea                   Quality                   intermitt

ent                 

                          10/10/2012                   None                

 

                    hypertension                   Quality                   sta

ble                 

                          10/10/2012                   None                

 

                    dizziness                   Quality                   lighth

eadedness/faint     

                          2012                   None                

 

                    dizziness                   Quality                   shakin

ess                 

                          2012                   None                

 

                dizziness                   Quality                   acute     

              

2012                              has had these episodes off and on past f

ew months  

             

 

                    dizziness                   Quality                   consta

nt                  

                          2012                   None                

 

                    dizziness                   Quality                   lighth

eadedness           

                          2012                   None                

 

                    dizziness                   Quality                   feelin

gs of unsteadiness  

                          2012                   None                

 

                    dizziness                   Onset and Resolution            

       sudden in 

onset                     2012                   None                

 

                    dizziness                   Onset of Symptom                

   1 days ago       

                          2012                   None                

 

                    dysuria                   Quality                   intermit

tent                

                          2012                   None                

 

                dysuria                   Quality                   aching      

             

2012                              None                

 

                    dysuria                   Onset and Resolution              

     sudden in onset

                          2012                   None                

 

                    dysuria                   Onset of Symptom                  

 2 days ago         

                          2012                   None                

 

                    muscle weakness                   Location                  

 diffusely          

                          2012                   None                

 

                    dizziness                   Quality                   lighth

eadedness/shakiness 

                          2012                   None                

 

                headache                   Quality                   dull       

            

2012                              None                

 

                    headache                   Location                   on the

 back side of head  

                          2012                   None                

 

                    headache                   Frequency of Episodes            

       daily        

                          2012                   None                

 

                nausea                   Quality                   acute        

           

2012                              had episode Friday while was with david proctor and he 

was having bloodwork and IV fluids done                

 

                dizziness                   Quality                   acute     

              

2012                              episode recently when was with boyfriend

 at 

hospital                

 

                    leg pain/sciatica                   Location                

   right leg 

sciatica                   2012                   None                

 

                    leg pain/sciatica                   Quality                 

  sharp pain        

                          2012                   None                

 

                    leg pain/sciatica                   Quality                 

  constant          

                          2012                   None                

 

                    leg pain/sciatica                   Onset and Resolution    

               

ongoing                   2012                   None                

 

                    leg pain/sciatica                   Onset of Symptom        

           2 weeks 

ago                       2012                   None                

 

                    leg pain/sciatica                   Limitation on Activities

                   

restricts weight bearing activity                   2012                  

                                        None                

 

                    leg pain/sciatica                   Limitation on Activities

                   

moderately limits activities                   2012                   None

               

 

                    hip pain                   Location                   on the

 right              

                          2012                   None                

 

                    hip pain                   Quality                   sharp p

ain                 

                          2012                   None                

 

                    hip pain                   Quality                   radiati

ng down leg         

                          2012                   None                

 

                    hip pain                   Onset and Resolution             

      sudden in 

onset                     2012                   None                

 

                    hip pain                   Onset of Symptom                 

  3 days ago        

                          2012                   None                

 

                    back pain                   Location                   in th

e upper back area   

                          2012                   None                

 

                back pain                   Quality                   sharp     

              

2012                              None                

 

                    back pain                   Quality                   pinchi

ng                  

                          2012                   None                

 

                    back pain                   Onset of Symptom                

   1 weeks ago      

                          2012                   None                

 

                    back pain                   Location                   lumba

r-sacral spine      

                          2012                   None                

 

                back pain                   Quality                   aching    

               

2012                              None                

 

                back pain                   Quality                   sharp     

              

2012                              None                

 

                    back pain                   Quality                   radiat

ing down right leg  

                          2012                   None                

 

                    back pain                   Onset of Symptom                

   1 weeks ago      

                          2012                   None                

 

                    pain, limb                   Location                   on t

he right leg        

                          2012                   None                

 

                    muscle weakness                   Quality                   

fatigue             

                          2012                   None                

 

                    muscle weakness                   Quality                   

shakey              

                          2012                   None                

 

                    muscle weakness                   Onset of Symptom          

         2 weeks ago

                          2012                   None                

 

                    arthralgia(s)                   Quality                   cr

amping              

                          2012                   None                

 

                    fatigue                   Onset and Resolution              

     ongoing        

                          2012                   None                

 

                fatigue                   Quality                   worsening   

                

2012                              None                

 

                    fatigue                   Quality                   intermit

tent                

                          2012                   None                

 

                    low blood pressure                   Onset and Resolution   

                

ongoing                   2012                   None                

 

                    arthralgia(s)                   Quality                   in

termittent          

                          2012                   None                

 

                    otalgia                   Location                   on both

 sides              

                          2011                   None                

 

                otalgia                   Quality                   constant    

               

2011                              None                

 

                otalgia                   Quality                   throbbing   

                

2011                              None                

 

                    otalgia                   Onset and Resolution              

     sudden in onset

                          2011                   None                

 

                    otalgia                   Onset of Symptom                  

 _ weeks ago        

                          2011                   None                

 

                otalgia                   Severity                   moderate   

                

2011                              None                

 

                    otalgia                   Frequency of Episodes             

      increasing    

                          2011                   None                

 

                    sore throat                   Location                   on 

both sides          

                          2011                   None                

 

                    sore throat                   Quality                   cons

tant                

                          2011                   None                

 

                    sore throat                   Quality                   achi

ng                  

                          2011                   None                

 

                    sore throat                   Quality                   scra

tchy                

                          2011                   None                

 

                    sore throat                   Onset and Resolution          

         sudden in 

onset                     2011                   None                

 

                    sore throat                   Onset of Symptom              

     _ weeks ago    

                          2011                   None                

 

                    headache                   Location                   in the

 occipital area     

                          2011                   None                

 

                headache                   Quality                   aching     

              

2011                              None                

 

                headache                   Quality                   constant   

                

2011                              None                

 

                    headache                   Quality                   throbbi

ng                  

                          2011                   None                

 

                    headache                   Onset and Resolution             

      sudden in 

onset                     2011                   None                

 

                    headache                   Onset of Symptom                 

  1 weeks ago       

                          2011                   None                

 

                    headache                   Limitation on Activities         

          moderately

limits activities                   2011                   None           

    

 

                    headache                   Location                   in the

 frontal area       

                          2011                   None                

 

                    back pain                   Location                   thora

cic spine           

                          2011                   None                

 

                    back pain                   Quality                   improv

ing with vimovo     

                          2011                   None                

 

                    abdominal pain                   Quality                   i

mproving            

                          2011                   None                

 

                    otalgia                   Location                   on the 

right               

                          2011                   None                

 

                    dizziness                   Quality                   sense 

of room spinning    

                          2011                   None                

 

                dizziness                   Quality                   vertigo   

                

2011                              None                

 

                    dizziness                   Quality                   interm

ittent              

                          2011                   None                

 

                    back pain                   Location                   thora

cic spine           

                          2011                   None                

 

                back pain                   Quality                   aching    

               

2011                              None                

 

                    otitis media                   Quality                   acu

te                  

                          2011                   None                

 

                dizziness                   Quality                   acute     

              

2011                              None                

 

                    abdominal pain                   Quality                   i

ntermittent         

                          2011                   None                

 

                    hip pain                   Location                   on the

 right              

                          2011                   None                

 

                    hip pain                   Quality                   improve

d very little from 

last week                   2011                   None                

 

                    hip pain                   Quality                   worsene

d with twisting     

                          2011                   None                

 

                    follow up                   Illness                   rocky

ms improved         

                          2011                   but still having problems

                

 

                hip pain                   Quality                   constant   

                

2011                              None                

 

                    hip pain                   Onset of Symptom                 

  8 hours ago       

                          2011                   noticed when woke up this

 morning       

        

 

                    hip pain                   Quality                   sharp p

ain                 

                          2011                   None                

 

                    hip pain                   Location                   on the

 right              

                          2011                   did walk on treadmill yes

terday with no shoes  

             

 

                    hip pain                   Location                   in the

 buttocks           

                          2011                   None                

 

                    hip pain                   Location                   in the

 groin              

                          2011                   None                

 

                    low blood pressure                   Quality                

   worsening        

                          2011                   None                

 

                    depression                   Onset and Resolution           

        ongoing     

                          2011                   thinks needs lexapro dose

 increased   

            

 

                fatigue                   Quality                   improving   

                

2010                              None                

 

                    muscle weakness                   Quality                   

improving           

                          2010                   None                

 

                    follow up                   Illness                   rocky

ms improved         

                          2010                   with Lexapro             

   

 

                    urinary urgency                   Onset and Resolution      

             ongoing

                          2010                   but improving with increa

sed 

water intake                

 

                    follow up                   Illness                   rocky

ms improved         

                          2010                   had weakness and shakes--

was busy week 

getting ready for granddaughter's wedding                

 

                    weakness                   Quality                   muscle 

weakness            

                          2010                   None                

 

                    dyskinesia or tremor                   Quality              

     acute          

                          2010                   came on out of blue      

          

 

                    follow up                   Illness                   rocky

ms remained 

unchanged                   2010                   still with fatigue, but

did have few days of increased energy after B12 shot                

 

                    constipation                   Quality                   imp

roving              

                          2010                   with stool softeners     

           

 

                    fatigue                   Onset and Resolution              

     ongoing        

                          2010                   None                

 

                    gastroesophageal reflux                   Quality           

        chronic     

                          2010                   with hiatal hernia and po

ssible early 

Shields's                

 

                    constipation                   Quality                   imp

roving              

                          2010                   with stool softener and p

robiotic--area in 

colon where couldn't get scope through                

 

                melena                   Quality                   black        

           

2010                              at times prior to scopes                

 

                fatigue                   Quality                   chronic     

              

2010                              on iron but not B12                

 

                    weakness                   Quality                   general

ized fatigue        

                          2010                   None                

 

                    weakness                   Quality                   decreas

ed energy           

                          2010                   None                

 

                    follow up                   Reason                   [facility]

s check             

                          2010                   Having trouble with gener

alized "shakiness" 

and overwhelming fatigue                

 

                    fatigue                   Onset of Symptom                  

 1 months ago       

                          2010                   None                

 

                fatigue                   Quality                   worsening   

                

2010                              None                

 

                    fatigue                   Limitation on Activities          

         moderately 

limits activities                   2010                   Able to 

maintain household, but has several days in a row where fatigue is overwhelming.
               

 

                    weakness                   Onset of Symptom                 

  1 months ago      

                          2010                   None                

 

                    weakness                   Frequency of Episodes            

       increasing   

                          2010                   None                

 

                    weakness                   Length of Episodes               

    2-3 days        

                          2010                   None                

 

                    weakness                   Limitation on Activities         

          moderately

limits activities                   2010                   None           

    

 

                    weakness                   Alleviating Factors              

     rest           

                          2010                   None                

 

                    weakness                   Quality                   muscle 

weakness            

                          2010                   Bilat leg/knee weakness a

nd feels 

shakey--xanax does help                

 

                fatigue                   Quality                   chronic     

              

2010                              None                

 

                    weakness                   Onset and Resolution             

      ongoing       

                          2010                   started after did 2days o

f extenive 

house cleaning                

 

                    disturbances of emotion                   Quality           

        chronic     

                          2010                   gets upset easily        

        



                                                                              



Advance Directives

          No Advance Directive data                                             
                      



Encounters

                      



                    Encounter                   Performer                   Loca

tion                

                          Codes                     Date                

 

                                                            () OFFICE/OUTPA

TIENT VISIT EST

Diagnosis: Acute serous otitis media, left ear[ICD10: H65.02]                   
                          Nat Lozoya                   [employer]

                          CPT-4: 59561                   2019             

   

 

                                                            ([id number]) OFFICE/OUTPA

TIENT VISIT EST

Diagnosis: Essential (primary) hypertension[ICD10: I10]

Diagnosis: Coronary atherosclerosis due to calcified coronary lesion[ICD10: 
I25.84]

Diagnosis: Hypoglycemia, unspecified[ICD10: E16.2]

Diagnosis: Other fatigue[ICD10: R53.83]

Diagnosis: Urinary tract infection, site not specified[ICD10: N39.0]            
                          Akanksha Xiangrodo BAUMAN SAramis 

[employer]                   CPT-4: 32210                   06/10/2019      

         

 

                                                            ([id number]) OFFICE/OUTPA

TIENT VISIT EST

Diagnosis: Essential (primary) hypertension[ICD10: I10]

Diagnosis: Gastro-esophageal reflux disease without esophagitis[ICD10: K21.9]

Diagnosis: Urinary tract infection, site not specified[ICD10: N39.0]            
                          Akanksha BAUMAN SAramis 

[employer]                   CPT-4: 48507                   2019      

         

 

                                                            ([id number]) OFFICE/OUTPA

TIENT VISIT EST

Diagnosis: Gastro-esophageal reflux disease without esophagitis[ICD10: K21.9]

Diagnosis: Epigastric pain[ICD10: R10.13]                                     

Akanksha BAUMAN SAramis [employer]            

   

                          CPT-4: 03586                   2018             

   

 

                                                            ([id number]) OFFICE/OUTPA

TIENT VISIT EST

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

Diagnosis: Essential (primary) hypertension[ICD10: I10]

Diagnosis: Generalized anxiety disorder[ICD10: F41.1]

Diagnosis: Gastro-esophageal reflux disease without esophagitis[ICD10: K21.9]   
                                 Akanksha DRIVER [facility]                   CPT-4: 21545                   2018   

            

 

                                                            OFFICE/OUTPATIENT VI

SIT EST

Diagnosis: Gastro-esophageal reflux disease without esophagitis[ICD10: K21.9]

Diagnosis: Palpitations[ICD10: R00.2]

Diagnosis: Urinary tract infection, site not specified[ICD10: N39.0]

Diagnosis: Generalized anxiety disorder[ICD10: F41.1]                           
                          Akanksha MEJIA Jooce [facility]      

                          CPT-4: 42521                   10/02/2018             

   

 

                                                            (22923) NURSE/OUTPAT

IENT VISIT EST

Diagnosis: Coronary atherosclerosis due to calcified coronary lesion[ICD10: 
I25.84]

Diagnosis: Hypoglycemia, unspecified[ICD10: E16.2]

Diagnosis: Dizziness and giddiness[ICD10: R42]

Diagnosis: Occlusion and stenosis of bilateral carotid arteries[ICD10: I65.23]  
                                  Akanksha DRIVER [facility]                   CPT-4: 26902                   

2018                

 

                                                            OFFICE/OUTPATIENT VI

SIT EST

Diagnosis: Epigastric pain[ICD10: R10.13]

Diagnosis: Generalized anxiety disorder[ICD10: F41.1]

Diagnosis: FLU VACCINE[ICD10: Z23]                                     

Akanksha DRIVER [facility]            

   

                          CPT-4: 11726                   2018             

   

 

                                                            ([id number]) OFFICE/OUTPA

TIENT VISIT EST

Diagnosis: Essential (primary) hypertension[ICD10: I10]

Diagnosis: Hypoglycemia, unspecified[ICD10: E16.2]

Diagnosis: Gastro-esophageal reflux disease without esophagitis[ICD10: K21.9]   
                                 Akanksha DRIVER [facility]                   CPT-4: 80726                   2018   

            

 

                                                            (48071) OFFICE/OUTPA

TIENT VISIT EST

Diagnosis: Dizziness and giddiness[ICD10: R42]                                  
                          Nat SPENCER

[facility]               

                          CPT-4: 44627                   2018             

   

 

                                                            (01079) OFFICE/OUTPA

TIENT VISIT EST

Diagnosis: Cervicalgia[ICD10: M54.2]

Diagnosis: Other spondylosis with radiculopathy, cervical region[ICD10: M47.22] 
                                   Akanksha DRIVER [facility]                   CPT-4: 14613                   

2018                

 

                                                            (10153) OFFICE/OUTPA

TIENT VISIT EST

Diagnosis: Hypoglycemia, unspecified[ICD10: E16.2]

Diagnosis: Other spondylosis with radiculopathy, cervical region[ICD10: M47.22]

Diagnosis: Vertigo of central origin, bilateral[ICD10: H81.43]

Diagnosis: Cervicocranial syndrome[ICD10: M53.0]                                
                          Akanksha MEJIA [facility]           

                          CPT-4: 39720                   2018             

   

 

                                                            (53885) OFFICE/OUTPA

TIENT VISIT EST

Diagnosis: Benign paroxysmal vertigo, bilateral[ICD10: H81.13]

Diagnosis: Otalgia, bilateral[ICD10: H92.03]                                    
                          Nat SPENCER

[facility]                 

                          CPT-4: 77434                   2018             

   

 

                                                            (24934) OFFICE/OUTPA

TIENT VISIT EST

Diagnosis: Low back pain[ICD10: M54.5]

Diagnosis: Radiculopathy, lumbosacral region[ICD10: M54.17]

Diagnosis: Left lower quadrant pain[ICD10: R10.32]                              
                          Akanksha MEJIA [facility]         

                          CPT-4: 07357                   2018             

   

 

                                                            (06021) OFFICE/OUTPA

TIENT VISIT EST

Diagnosis: FLU VACCINE[ICD10: Z23]                                     

Akanksha DRIVER [facility]            

   

                          CPT-4: 10742                   10/06/2017             

   

 

                                                            (16460) OFFICE/OUTPA

TIENT VISIT EST

Diagnosis: Mixed hyperlipidemia[ICD10: E78.2]

Diagnosis: Essential (primary) hypertension[ICD10: I10]

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

Diagnosis: Impaired fasting glucose[ICD10: R73.01]

Diagnosis: Other fatigue[ICD10: R53.83]                                     

Akanksha DRIVER [facility]oo[facility]            

   

                          CPT-4: 04143                   2017             

   

 

                                                            (50675) OFFICE/OUTPA

TIENT VISIT EST

Diagnosis: Pain in thoracic spine[ICD10: M54.6]

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 
                                   Akanksha DRIVER [facility]oo[facility]                   CPT-4: 34626                   

2017                

 

                                                            (95895) OFFICE/OUTPA

TIENT VISIT EST

Diagnosis: Left lower quadrant pain[ICD10: R10.32]

Diagnosis: Low back pain[ICD10: M54.5]                                     

Akanksha DRIVER [facility]oo[facility]            

   

                          CPT-4: 55458                   2017             

   

 

                                                            (62063) OFFICE/OUTPA

TIENT VISIT EST

Diagnosis: Mixed hyperlipidemia[ICD10: E78.2]

Diagnosis: Essential (primary) hypertension[ICD10: I10]

Diagnosis: Hypoglycemia, unspecified[ICD10: E16.2]                              
                          Akanksha MEJIA [facility]         

                          CPT-4: 81682                   2017             

   

 

                                                            (77346) OFFICE/OUTPA

TIENT VISIT EST

Diagnosis: Impaired fasting glucose[ICD10: R73.01]

Diagnosis: Mastodynia[ICD10: N64.4]

Diagnosis: Mixed hyperlipidemia[ICD10: E78.2]

Diagnosis: Essential (primary) hypertension[ICD10: I10]

Diagnosis: Other fatigue[ICD10: R53.83]                                     

Akanksha DRIVER [facility]            

   

                          CPT-4: 78322                   2017             

   

 

                                                            (45227) OFFICE/OUTPA

TIENT VISIT EST

Diagnosis: Acute upper respiratory infection, unspecified[ICD10: J06.9]         
                           Luba Stevenson                   AKANKSHA DRIVER [facility]                   CPT-4: 21141                   2017      

         

 

                                                            (66527) OFFICE/OUTPA

TIENT VISIT EST

Diagnosis: Acute mastoiditis without complications, right ear[ICD10: H70.001]   
                                 Akanksha DRIVER [facility]                   CPT-4: 32945                   2016   

            

 

                                                            (04764) OFFICE/OUTPA

TIENT VISIT EST

Diagnosis: FLU VACCINE[ICD10: Z23]                                     

Akanksha DRIVER [facility]            

   

                          CPT-4: 72682                   10/07/2016             

   

 

                                                            (52671) OFFICE/OUTPA

TIENT VISIT EST

Diagnosis: Mixed hyperlipidemia[ICD10: E78.2]

Diagnosis: Essential (primary) hypertension[ICD10: I10]

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

Diagnosis: Impaired fasting glucose[ICD10: R73.01]                              
                          Akanksha MEJIA [facility]         

                          CPT-4: 77421                   2016             

   

 

                                                            (61234) OFFICE/OUTPA

TIENT VISIT EST

Diagnosis: Constipation, unspecified[ICD10: K59.00]                             
                          Akanksha MEJIA [facility]        

                          CPT-4: 42966                   2016             

   

 

                                                            (19390) OFFICE/OUTPA

TIENT VISIT EST

Diagnosis: Essential (primary) hypertension[ICD10: I10]

Diagnosis: Mixed hyperlipidemia[ICD10: E78.2]

Diagnosis: Chronic kidney disease, stage 1[ICD10: N18.1]                        
                          Akanksha MEJIA [facility]   

                          CPT-4: 88001                   2016             

   

 

                                                            (37421) OFFICE/OUTPA

TIENT VISIT EST

Diagnosis: Muscle weakness (generalized)[ICD10: M62.81]

Diagnosis: Dizziness and giddiness[ICD10: R42]

Diagnosis: Essential (primary) hypertension[ICD10: I10]

Diagnosis: History of falling[ICD10: Z91.81]                                    
                          Akanksha MEJIA [facility]               

                          CPT-4: 94771                   2015             

   

 

                                                            (51395) OFFICE/OUTPA

TIENT VISIT EST

Diagnosis: FLU VACCINE[ICD10: Z23]                                     

Akanksha DRIVER [facility]            

   

                          CPT-4: 96470                   10/16/2015             

   

 

                                                            (71496) OFFICE/OUTPA

TIENT VISIT EST

Diagnosis: Acute renal failure[ICD9: 584.9]

Diagnosis: POLYURIA[ICD9: 788.42]                                     Akanksha DRIVER DO [facility]                   CPT-4: 

54088                                   09/15/2015                

 

                                                            (71176) OFFICE/OUTPA

TIENT VISIT EST

Diagnosis: HYPERLIPIDEMIA NEC/NOS[ICD9: 272.4]

Diagnosis: HYPERTENSION[ICD9: 401.9]

Diagnosis: CAD[ICD9: 414.00]

Diagnosis: Hyperglycemia[ICD9: 790.29]

Diagnosis: MALAISE AND FATIGUE[ICD9: 780.79]                                    
                          Akanksha MEJIA [facility]               

                          CPT-4: 86076                   09/10/2015             

   

 

                                                            (70512) OFFICE/OUTPA

TIENT VISIT EST

Diagnosis: MALAISE AND FATIGUE[ICD9: 780.79]

Diagnosis: HYPOGLYCEMIA[ICD9: 251.2]

Diagnosis: Grieving[ICD9: 309.0]

Diagnosis: ABDOMINAL PAIN[ICD9: 789.00]                                     

Akanksha DRIVER [facility]            

   

                          CPT-4: 84341                   2015             

   

 

                                                            OFFICE/OUTPATIENT VI

SIT EST

Diagnosis: CERUMEN IMPACTION[ICD9: 380.4]

Diagnosis: EUSTACHIAN TUBE DYSFUNCTION[ICD9: 381.81]                            
                          Bertha FuentesLilayuri BAUMAN S. INOCENCIO

RENDER DO [facility]     

                          CPT-4: 16105                   2015             

   

 

                                                            (83342) OFFICE/OUTPA

TIENT VISIT EST

Diagnosis: Leg pain[ICD9: 729.5]

Diagnosis: SUPERFIC PHLEBITIS-LEG[ICD9: 451.0]                                  
                          Akanksha MEJIA [facility]             

                          CPT-4: 57031                   2015             

   

 

                                                            (27738) OFFICE/OUTPA

TIENT VISIT EST

Diagnosis: Thoracic back pain[ICD9: 724.1]

Diagnosis: SPASM OF MUSCLE[ICD9: 728.85]                                     

Akanksha DRIVER [facility]            

   

                          CPT-4: 46332                   2015             

   

 

                                                            (46882) OFFICE/OUTPA

TIENT VISIT EST

Diagnosis: DIZZINESS/VERTIGO[ICD9: 780.4]

Diagnosis: Benign positional vertigo[ICD9: 386.11]

Diagnosis: HYPERTENSION[ICD9: 401.9]

Diagnosis: Suspicious nevus[ICD9: 238.2]                                     

Akanksha DRIVER [facility]            

   

                          CPT-4: 74919                   2015             

   

 

                                                            (14267) OFFICE/OUTPA

TIENT VISIT EST

Diagnosis: FLU VACCINE[ICD10: Z23]                                     

Akanksha DRIVER [facility]            

   

                          CPT-4: 60538                   10/17/2014             

   

 

                                                            OFFICE/OUTPATIENT VI

SIT EST

Diagnosis: SINUSITIS, ACUTE[ICD9: 461.9]

Diagnosis: EUSTACHIAN TUBE DYSFUNCTION[ICD9: 381.81]                            
                          Bertha SYKES [facility]     

                          CPT-4: 22994                   2014             

   

 

                                                            (95271) OFFICE/OUTPA

TIENT VISIT EST

Diagnosis: DIZZINESS/VERTIGO[ICD9: 780.4]

Diagnosis: HYPERTENSION[ICD9: 401.9]                                     

Akanksha DRIVER [facility]            

   

                          CPT-4: 70681                   2014             

   

 

                                                            (58617) OFFICE/OUTPA

TIENT VISIT EST

Diagnosis: HYPERTENSION[ICD9: 401.9]

Diagnosis: MALAISE AND FATIGUE[ICD9: 780.79]

Diagnosis: SINUSITIS, ACUTE[ICD9: 461.9]                                     

Akanksha DRIVER [facility]            

   

                          CPT-4: 09071                   2014             

   

 

                                                            (30002) OFFICE/OUTPA

TIENT VISIT EST

Diagnosis: HYPERLIPIDEMIA NEC/NOS[ICD9: 272.4]

Diagnosis: HYPERTENSION[ICD9: 401.9]

Diagnosis: B12 DEFIC ANEMIA NEC[ICD9: 281.1]

Diagnosis: HYPOGLYCEMIA[ICD9: 251.2]                                     

Akanksha DRIVER [facility]            

   

                          CPT-4: 02632                   2014             

   

 

                                                            OFFICE/OUTPATIENT VI

SIT EST

Diagnosis: COUGH[ICD9: 786.2]                                     Bertha DRIVER [facility]                   

CPT-4: 60162                            2014                

 

                                                            OFFICE/OUTPATIENT VI

SIT EST

Diagnosis: COUGH[ICD9: 786.2]

Diagnosis: URI, ACUTE[ICD9: 465.9]                                     Bertha DRIVER DO [facility]                   

CPT-4: 26967                            10/15/2013                

 

                                                            (38556) OFFICE/OUTPA

TIENT VISIT EST

Diagnosis: HYPERTENSION[ICD9: 401.9]

Diagnosis: CEPHALGIA[ICD9: 784.0]

Diagnosis: ANXIETY STATE NOS[ICD9: 300.00]

Diagnosis: FLU VACCINE[ICD9: V04.81]                                     

Akanksha DRIVER [facility]            

   

                          CPT-4: 25685                   2013             

   

 

                                                            (62836) OFFICE/OUTPA

TIENT VISIT EST

Diagnosis: DIZZINESS/VERTIGO[ICD9: 780.4]

Diagnosis: SINUSITIS, ACUTE[ICD9: 461.9]

Diagnosis: Benign positional vertigo[ICD9: 386.11]                              
                          Akanksha MEJIA [facility]         

                          CPT-4: 08713                   2013             

   

 

                                                            OFFICE/OUTPATIENT VI

SIT EST

Diagnosis: OTITIS MEDIA NOS[ICD9: 382.9]                                     

Akanksha DRIVER [facility]            

   

                          CPT-4: 50931                   2013             

   

 

                                                            OFFICE/OUTPATIENT VI

SIT EST

Diagnosis: Perforation of ear drum[ICD9: 384.20]

Diagnosis: SINUSITIS, ACUTE[ICD9: 461.9]                                     

[facility]            

   

                          CPT-4: 18187                   05/10/2013             

   

 

                                                            (41768) OFFICE/OUTPA

TIENT VISIT EST

Diagnosis: Mastalgia[ICD9: 611.71]                                     

Akanksha DRIVER [facility]            

   

                          CPT-4: 85057                   2013             

   

 

                                                            (67824) OFFICE/OUTPA

TIENT VISIT EST

Diagnosis: HYPERLIPIDEMIA NEC/NOS[ICD9: 272.4]

Diagnosis: HYPERTENSION[ICD9: 401.9]

Diagnosis: DIZZINESS/VERTIGO[ICD9: 780.4]

Diagnosis: Hyperglycemia[ICD9: 790.29]

Diagnosis: MALAISE AND FATIGUE[ICD9: 780.79]                                    
                          Akanksha MEJIA [facility]               

                          CPT-4: 01492                   2012             

   

 

                                                            (42937) OFFICE/OUTPA

TIENT VISIT EST

Diagnosis: EUSTACHIAN TUBE DYSFUNCTION[ICD9: 381.81]

Diagnosis: DIZZINESS/VERTIGO[ICD9: 780.4]

Diagnosis: ABDOMINAL PAIN[ICD9: 789.00]

Diagnosis: GERD[ICD9: 530.81]

Diagnosis: CERUMEN IMPACTION[ICD9: 380.4]                                     

Akanksha DRIVER DO [facility]            

   

                          CPT-4: 20951                   2012             

   

 

                                                            OFFICE/OUTPATIENT VI

SIT EST

Diagnosis: ABDOMINAL PAIN[ICD9: 789.00]

Diagnosis: DYSPEPSIA[ICD9: 536.8]                                     Akanksha DRIVER DO [facility]                   CPT-4: 

73403                                   10/23/2012                

 

                                                            (63118) OFFICE/OUTPA

TIENT VISIT EST

Diagnosis: ABDOMINAL PAIN[ICD9: 789.00]

Diagnosis: DYSPEPSIA[ICD9: 536.8]

Diagnosis: IBS[ICD9: 564.1]                                     Akanksha DRIVER DO [facility]                   CPT-4: 

45897                                   10/10/2012                

 

                                                            OFFICE/OUTPATIENT VI

SIT EST

Diagnosis: DIZZINESS/VERTIGO[ICD9: 780.4]

Diagnosis: HYPOGLYCEMIA[ICD9: 251.2]                                     

Akanksha DRIVER [facility]            

   

                          CPT-4: 24611                   2012             

   

 

                                                            (52167) OFFICE/OUTPA

TIENT VISIT EST

Diagnosis: URINARY TRACT INFECTION[ICD9: 599.0]                                 
                          Akanksha MEJIA [facility]            

                          CPT-4: 02872                   2012             

   

 

                                                            (74066) OFFICE/OUTPA

TIENT VISIT EST

Diagnosis: HYPERLIPIDEMIA NEC/NOS[ICD9: 272.4]

Diagnosis: HYPERTENSION[ICD9: 401.9]

Diagnosis: MALAISE AND FATIGUE[ICD9: 780.79]

Diagnosis: DIZZINESS/VERTIGO[ICD9: 780.4]                                     

Akanksha DRIVER [facility]            

   

                          CPT-4: 83449                   2012             

   

 

                                                            (13904) OFFICE/OUTPA

TIENT VISIT EST

Diagnosis: DIZZINESS/VERTIGO[ICD9: 780.4]

Diagnosis: MALAISE AND FATIGUE[ICD9: 780.79]

Diagnosis: HYPERTENSION[ICD9: 401.9]                                     

Akanksha DRIVER DO [facility]            

   

                          CPT-4: 58913                   2012             

   

 

                                                            OFFICE/OUTPATIENT VI

SIT EST

Diagnosis: LUMB/LUMBOSAC DISC DEGEN[ICD9: 722.52]

Diagnosis: Radiculopathy of leg[ICD9: 724.4]

Diagnosis: SACROILIITIS NEC[ICD9: 720.2]

Diagnosis: SPINAL ENTHESOPATHY[ICD9: 720.1]                                     

Akanksha DRIVER [facility]            

   

                          CPT-4: 41073                   2012             

   

 

                                                            (36075) OFFICE/OUTPA

TIENT VISIT EST

Diagnosis: PAIN, LOWER BACK[ICD9: 724.2]

Diagnosis: SPASM OF MUSCLE[ICD9: 728.85]

Diagnosis: SCIATICA[ICD9: 724.3]

Diagnosis: Lumbar degenerative disc disease[ICD9: 722.52]                       
                          Akanksha MEJIA [facility]  

                          CPT-4: 45151                   2012             

   

 

                                                            (25933) OFFICE/OUTPA

TIENT VISIT EST

Diagnosis: PAIN IN THORACIC SPINE[ICD9: 724.1]

Diagnosis: SPASM OF MUSCLE[ICD9: 728.85]                                     

Akanksha DRIVER [facility]            

   

                          CPT-4: 10729                   2012             

   

 

                                                            (76182) OFFICE/OUTPA

TIENT VISIT EST

Diagnosis: PAIN, LOWER BACK[ICD9: 724.2]

Diagnosis: SPASM OF MUSCLE[ICD9: 728.85]

Diagnosis: Sacroiliac dysfunction[ICD9: 739.4]

Diagnosis: Lumbar degenerative disc disease[ICD9: 722.52]                       
                          Akanksha MEJIA [facility]  

                          CPT-4: 52028                   2012             

   

 

                                                            OFFICE/OUTPATIENT VI

SIT EST

Diagnosis: MALAISE AND FATIGUE[ICD9: 780.79]

Diagnosis: HYPOTENSION[ICD9: 458.9]

Diagnosis: CAD[ICD9: 414.00]                                     Akanksha DRIVER [facility]                   CPT-4: 

40771                                   2012                

 

                                                            OFFICE/OUTPATIENT VI

SIT EST

Diagnosis: SINUSITIS, ACUTE[ICD9: 461.9]

Diagnosis: PHARYNGITIS, ACUTE[ICD9: 462]

Diagnosis: CERUMEN IMPACTION[ICD9: 380.4]                                     

[facility]            

   

                          CPT-4: 77751                   2011             

   

 

                                                            OFFICE/OUTPATIENT VI

SIT EST

Diagnosis: PAIN IN THORACIC SPINE[ICD9: 724.1]

Diagnosis: GERD[ICD9: 530.81]

Diagnosis: DIZZINESS/VERTIGO[ICD9: 780.4]                                     

[facility]            

   

                          CPT-4: 35539                   2011             

   

 

                                                            OFFICE/OUTPATIENT VI

SIT EST                                 

                          Akanksha MCKEON

[facility]            

                          CPT-4: 53638                   2011             

   

 

                                                            (06946) OFFICE/OUTPA

TIENT VISIT EST                         

                          Akanksha MCKEON

[facility]    

                          CPT-4: 01447                   2011             

   

 

                                                            (52129) OFFICE/OUTPA

TIENT VISIT, EST

Diagnosis: PAIN, LOWER BACK[ICD9: 724.2]                                     

[facility]            

   

                          CPT-4: 96650                   2011             

   

 

                                                            (83298) OFFICE/OUTPA

TIENT VISIT, EST                        

                          Akanksha MCKEON

[facility]   

                          CPT-4: 19300                   2011             

   

 

                                                            (39701) OFFICE/OUTPA

TIENT VISIT, EST                        

                          Akanksha BAUMAN S. ORE

[facility]   

                          CPT-4: 18163                   2010             

   

 

                                                            (59973) OFFICE/OUTPA

TIENT VISIT, EST                        

                          Akanksha BAUMAN S. XIANG

[facility]   

                          CPT-4: 94294                   2010             

   

 

                                                            (62152) OFFICE/OUTPA

TIENT VISIT, EST                        

                          Akanksha CHEATHAM. XIANG

[facility]   

                          CPT-4: 97677                   2010             

   

 

                                                            (02686) OFFICE/OUTPA

TIENT VISIT, EST                        

                          Akanksha BAUMAN S. ORE

[facility]   

                          CPT-4: 27036                   2010             

   

 

                                                            (11953) OFFICE/OUTPA

TIENT VISIT, EST                        

                          Akanksha BAUMAN S. XIANG

[facility]   

                          CPT-4: 63043                   2010             

   

 

                                                            (28484) OFFICE/OUTPA

TIENT VISIT, EST                        

                          Akanksha CHEATHAM. XIANG

[facility]   

                          CPT-4: 49972                   2010             

   



                                                                                
                                                                                
                                                                                
                                                                                
                                                                                
                                                                                
                                                                                
                                                                                
                                                                                
                                                                                
                            



Plan of Care

                      



                    Planned Activity                   Notes                   C

odes                

                          Status                    Date                

 

                          Visit Diagnosis Plan: Acute serous otitis media, left 

ear                   

Discussion: instructed to use flonase and claritin daily for symptom relief. 
discussed with patient that if no improvement in 2 weeks, will need to follow up
with ENT for audiology exam. instructed to call office with any other symptoms 
such as otalgia, dysphagia, fever, etc.

                                        ICD-9 : 381.01

ICD-10 : H65.02

                                                    2019                

 

                                        Visit Diagnosis Plan: Urinary tract infe

ction, site not specified               

                                        Discussion: Started an old abx prescript

ion

                                        ICD-9 : 599.0

ICD-10 : N39.0

                                                    06/10/2019                

 

                          Visit Diagnosis Plan: Hypoglycemia, unspecified       

            Discussion: 

Monitor BS At least 6 small meals a day each with protein

                                        ICD-9 : 251.2

ICD-10 : E16.2

                                                    06/10/2019                

 

                          Visit Diagnosis Plan: Essential (primary) hypertension

                   

Discussion: Stable Check CMP

                                        ICD-9 : 401.9

ICD-10 : I10

                                                    06/10/2019                

 

                          Visit Diagnosis Plan: Other fatigue                   

Discussion: Check CBC, TSH

                                        ICD-9 : 780.79

ICD-10 : R53.83

                                                    06/10/2019                

 

                                        Appointment: Akanksha Driver

WPtel:[phone]

                                        [address] [place], [place] [id number]

[place]                                                           FOLLOW UP          

 

                                        06/10/2019                

 

                          Visit Diagnosis Plan: Essential (primary) hypertension

                   

Discussion: Stable Sees Dr. Clements this month

                                        ICD-9 : 401.9

ICD-10 : I10

                                                    2019                

 

                                        Visit Diagnosis Plan: Urinary tract infe

ction, site not specified               

                                        Discussion: Macrobid 100mg po BID for 1 

week

                                        ICD-9 : 599.0

ICD-10 : N39.0

                                                    2019                

 

                                        Visit Diagnosis Plan: Gastro-esophageal 

reflux disease without esophagitis      

                                        Discussion: Stable on omeprazole

Follow Up: 3 months

                                        ICD-9 : 530.81

ICD-10 : K21.9

                                                    2019                

 

                                        Appointment: Akanksha Driver

WPtel:[phone]

                                        [address] [place], [place] [id number]

[place]                                                           FOLLOW UP          

 

                                        2019                

 

                                        Patient Education: metoprolol tartrate- 

[employer] C[employer]on [id number]              

                                        

https://www.[employer]/samplemd/resources/getResource/61/[email or web address]-70rs-39

[email or web address]                                             Completed       

      

                                        2019                

 

                                        Appointment: Akanksha Drivertel:[phone]

                                        [address] [place], [place] [id number]

[place]                                                           CANCELED           

 

                                        02/15/2019                

 

                                        Visit Diagnosis Plan: Gastro-esophageal 

reflux disease without esophagitis      

                                        Discussion: Continue protonix and carafa

te Proceed with updated EGD

                                        ICD-9 : 530.81

ICD-10 : K21.9

                                                    2018                

 

                          Visit Diagnosis Plan: Epigastric pain                 

  Discussion: Check CT 

abdomen/pelvis due to ongoing abdominal pain

                                        ICD-9 : 789.06

ICD-10 : R10.13

                                                    2018                

 

                                        Appointment: Akanksha Driver

WPtel:[phone]

                                        [address] [place], [place] [id number]

[place]                                                           FOLLOW UP          

 

                                        2018                

 

                    Care Plan: CT PELVIS W/O DYE                                

       LOINC : 

61897-2

                          Pending                   2018                

 

                    Care Plan: CT ABDOMEN W/O DYE                               

        LOINC : 

58116-1

                          Pending                   2018                

 

                    Care Plan: Referral Order                                   

    SNOMED-CT : 

[id number]

                          Pending                   2018                

 

                                        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

art disease of native coronary artery 

without angina pectoris                   Discussion: S/P cardiac cath--doing 

medical management with imdur and metoprolol increased Has fwup with cardiology 
next week Discussed cardiac rehab

                                        ICD-9 : 414.00

ICD-10 : I25.10

                                                    2018                

 

                          Visit Diagnosis Plan: Generalized anxiety disorder    

               Discussion:

Stable

                                        ICD-9 : 300.00

ICD-10 : F41.1

                                                    2018                

 

                                        Visit Diagnosis Plan: Gastro-esophageal 

reflux disease without esophagitis      

                                        Discussion: Continue protonix at BID dos

ing and carafate BID

Follow Up: 2 months

                                        ICD-9 : 530.81

ICD-10 : K21.9

                                                    2018                

 

                          Visit Diagnosis Plan: Essential (primary) hypertension

                   

Discussion: Stable

                                        ICD-9 : 401.9

ICD-10 : I10

                                                    2018                

 

                                        Appointment: Akanksha Driver

WPtel:[phone]

                                        [address] [place], [place] [address]

[place]                                                           FOLLOW UP          

 

                                        2018                

 

                                        Visit Diagnosis Plan: Gastro-esophageal 

reflux disease without esophagitis      

                                        Discussion: Continue protonix at BID dos

ing Continue carafate at q 

AC and HS dosing for 2 more weeks then decrease to BID dosing

Follow Up: 4 weeks

                                        ICD-9 : 530.81

ICD-10 : K21.9

                                                    10/02/2018                

 

                                        Visit Diagnosis Plan: Urinary tract infe

ction, site not specified               

                                        Discussion: Reculture urine

                                        ICD-9 : 599.0

ICD-10 : N39.0

                                                    10/02/2018                

 

                          Visit Diagnosis Plan: Generalized anxiety disorder    

               Discussion:

Increase xanax to BID dosing especially with upcoming cardiac testing which is 
already making patient more anxious and worried

                                        ICD-9 : 300.00

ICD-10 : F41.1

                                                    10/02/2018                

 

                          Visit Diagnosis Plan: Palpitations                   D

iscussion: Cardilology 

going to schedule Lexiscan

                                        ICD-9 : 785.1

ICD-10 : R00.2

                                                    10/02/2018                

 

                                        Appointment: Akanksha Driver

WPtel:[phone]

                                        [address] [place] [address]                                                           FOLLOW UP          

 

                                        10/02/2018                

 

                          Patient Education: Patient Medication Summary         

                          

                                        Completed                   10/02/2018  

              

 

                                        Appointment: Akanksha Driver

WPtel:[phone]

                                        [address] [place] [address]                                                           LAB                

 

                                        2018                

 

                          Patient Education: Patient Medication Summary         

                          

                                        Completed                   2018  

              

 

                          Visit Diagnosis Plan: Epigastric pain                 

  Discussion: Continue 

protonix and carafate but make into a slurry Flu shot given Discussed EGD if 
symptoms persist

Follow Up: 2 weeks

                                        ICD-9 : 789.06

ICD-10 : R10.13

                                                    2018                

 

                          Visit Diagnosis Plan: Generalized anxiety disorder    

               Discussion:

Did discuss increased risk of benzodiazepines causing dementia but at this time 
will stay at xanax 0.25mg po BID

                                        ICD-9 : 300.00

ICD-10 : F41.1

                                                    2018                

 

                                        Appointment: Akanksha Driver

WPtel:[phone]

                                        [address] [place] [address]

[place]                                                           FOLLOW UP          

 

                                        2018                

 

                          Patient Education: Patient Medication Summary         

                          

                                        Completed                   2018  

              

 

                          Visit Diagnosis Plan: Essential (primary) hypertension

                   

Discussion: Has hydralazine to use prn per cardiology Going to do 30 day holter 
monitor

                                        ICD-9 : 401.9

ICD-10 : I10

                                                    2018                

 

                          Visit Diagnosis Plan: Hypoglycemia, unspecified       

            Discussion: 6 

small meals a day--each with protein Increase fluid intake

                                        ICD-9 : 251.2

ICD-10 : E16.2

                                                    2018                

 

                                        Visit Diagnosis Plan: Gastro-esophageal 

reflux disease without esophagitis      

                                        Discussion: Change to protonix 40mg po B

ID

Follow Up: 1 months

                                        ICD-9 : 530.81

ICD-10 : K21.9

                                                    2018                

 

                                        Appointment: Akanksha Driver

WPtel:[phone]

                                        [facility] [address]                                                           ACUTE ILLNESS      

 

                                        2018                

 

                          Patient Education: Patient Medication Summary         

                          

                                        Completed                   2018  

              

 

                          Visit Diagnosis Plan: Dizziness and giddiness         

          Discussion: 

blood work to be completed in office including cmp, cbc, troponin to rule out 
glucose intolerance, infection, dehydration. instructed patient that she needs 
to see her cardiologist sooner than oct and patient requested we contact dr. clements's office. will have front office make appt. patient has carotid doppler 
annually.

                                        ICD-9 : 780.4

ICD-10 : R42

                                                    2018                

 

                                        Appointment: Nat Lozoya

                                        [address] [place] [address]                                                           ACUTE ILLNESS      

 

                                        2018                

 

                          Patient Education: Patient Medication Summary         

                          

                                        Completed                   2018  

              

 

                          Visit Diagnosis Plan: Cervicalgia                   Di

scussion: Complete course 

of PT since symptoms improved Continue stretching exercises Notify if symptoms 
return or worsen

                                        ICD-9 : 723.1

ICD-10 : M54.2

                                                    2018                

 

                                        Appointment: Akanksha Driver

WPtel:[phone]

                                        [facility] [place] [id number]

                                                           FOLLOW UP          

 

                                        2018                

 

                          Patient Education: Patient Medication Summary         

                          

                                        Completed                   2018  

              

 

                          Visit Diagnosis Plan: Hypoglycemia, unspecified       

            Discussion: 

Eat something with protein every 2 hrs

                                        ICD-9 : 251.2

ICD-10 : E16.2

                                                    2018                

 

                                        Visit Diagnosis Plan: Other spondylosis 

with radiculopathy, cervical region     

                                        Discussion: Check MRI of cervical spine

                                        ICD-9 : 721.0

ICD-10 : M47.22

                                                    2018                

 

                          Visit Diagnosis Plan: Vertigo of central origin, bilat

eral                   

Discussion: Discussed PT for vestibular therapy

                                        ICD-9 : 386.2

ICD-10 : H81.43

                                                    2018                

 

                                        Appointment: Akanksha Driver

WPtel:[phone] [address]                                                                             

2018                

 

                          Patient Education: Patient Medication Summary         

                          

                                        Completed                   2018  

              

 

                    Care Plan: MRI NECK SPINE W/O DYE                           

            LOINC : 

57623-1

                          Pending                   2018                

 

                          Visit Diagnosis Plan: Otalgia, bilateral              

     Discussion: kenalog 

40 mg given to patient im. instructed patient to monitor blood sugar at home due
to risk of increased sugar. instructed patient to rtc on wednesday if no 
improvement and may require antibiotic.

                                        ICD-9 : 388.70

ICD-10 : H92.03

                                                    2018                

 

                          Visit Diagnosis Plan: Benign paroxysmal vertigo, bilat

eral                   

Discussion: patient has meclizine at home but states it makes her too tired. 
instructed patient to cut in half and take at night. if it doesn't cause too 
much drowsiness, instructed to take bid until feeling better. instructed to rtc 
wed if no improvement or worsening symptoms. instructed patient to increase 
fluid intake as well.

                                        ICD-9 : 386.11

ICD-10 : H81.13

                                                    2018                

 

                                        Appointment: Nat Lozoya

                                        [address] [place] [address]                                                           ACUTE ILLNESS      

 

                                        2018                

 

                          Patient Education: Patient Medication Summary         

                          

                                        Completed                   2018  

              

 

                          Visit Diagnosis Plan: Left lower quadrant pain        

           Discussion: 

Culture urine Notify if worsens

                                        ICD-9 : 789.04

ICD-10 : R10.32

                                                    2018                

 

                          Visit Diagnosis Plan: Low back pain                   

Discussion: Stretches 

Topical aspercreme Tylenol 1 po TID

                                        ICD-9 : 724.2

ICD-10 : M54.5

                                                    2018                

 

                          Visit Diagnosis Plan: Radiculopathy, lumbosacral regio

n                   

Discussion: As above

                                        ICD-9 : 724.4

ICD-10 : M54.17

                                                    2018                

 

                                        Appointment: Akanksha Driver

WPtel:[phone] [address]                                                           ACUTE ILLNESS      

 

                                        2018                

 

                          Patient Education: Patient Medication Summary         

                          

                                        Completed                   2018  

              

 

                                        Appointment: Akanksha Driver

WPtel:[phone]

                                        [address] [place], [place] [id number]

[place]                                                           INJECTION          

 

                                        10/06/2017                

 

                          Patient Education: Patient Medication Summary         

                          

                                        Completed                   10/06/2017  

              

 

                                        Appointment: Akanksha Driver

WPtel:[phone]

                                        [address] [place], [place] [id number]

[place]                                                           LAB                

 

                                        2017                

 

                          Patient Education: Patient Medication Summary         

                          

                                        Completed                   2017  

              

 

                          Visit Diagnosis Plan: Pain in thoracic spine          

         Discussion: PT 

has helped Continue stretches and walking Discussed joining [facility] to 
continue exercise

                                        ICD-9 : 724.1

ICD-10 : M54.6

                                                    2017                

 

                                        Visit Diagnosis Plan: Other intervertebr

al disc degeneration, lumbar region     

                                        Follow Up: 3 months

                                        ICD-9 : 722.52

ICD-10 : M51.36

                                                    2017                

 

                                        Appointment: Akanksha Driver

WPtel:[phone]

                                        [address] [place], [place] [id number]

                                                           FOLLOW UP          

 

                                        2017                

 

                          Patient Education: Patient Medication Summary         

                          

                                        Completed                   2017  

              

 

                          Visit Diagnosis Plan: Low back pain                   

Discussion: Start PT for 

low back- Seems like abdominal pain is radiating from low back

Follow Up: 5 weeks

                                        ICD-9 : 724.2

ICD-10 : M54.5

                                                    2017                

 

                          Visit Diagnosis Plan: Left lower quadrant pain        

           Discussion: Had

CT scan of abdomen/pelvis in 2017 and normal colonoscopy in 2015

                                        ICD-9 : 789.04

ICD-10 : R10.32

                                                    2017                

 

                                        Appointment: Akanksha Driver

WPtel:[phone]

                                        [address] [place], [place] [id number]

                                                           ACUTE ILLNESS      

 

                                        2017                

 

                          Patient Education: Patient Medication Summary         

                          

                                        Completed                   2017  

              

 

                                        Appointment: Akanksha Driver

WPtel:[phone]

                                        [address] [place], [place] [id number]

[place]                                                           LAB                

 

                                        2017                

 

                          Patient Education: Patient Medication Summary         

                          

                                        Completed                   2017  

              

 

                          Visit Diagnosis Plan: Mixed hyperlipidemia            

       Discussion: Check 

lipids

                                        ICD-9 : 272.4

ICD-10 : E78.2

                                                    2017                

 

                          Visit Diagnosis Plan: Impaired fasting glucose        

           Discussion: 

Return in AM for CMP, HbA1C Accuchecks daily Diet/Exercise discussed at length

                                        ICD-9 : 790.29

ICD-10 : R73.01

                                                    2017                

 

                          Visit Diagnosis Plan: Other fatigue                   

Discussion: Check CBC, TSH

                                        ICD-9 : 780.79

ICD-10 : R53.83

                                                    2017                

 

                          Visit Diagnosis Plan: Mastodynia                   Dis

cussion: Check Bilateral 

diagnostic mammogram with US

                                        ICD-9 : 611.71

ICD-10 : N64.4

                                                    2017                

 

                                        Appointment: Akanksha Driver

WPtel:[phone]

                                        [address] [place], [place] [id number]

US                   3/7 confirmed `sl                                       

ACUTE ILLNESS                           2017                

 

                          Patient Education: Patient Medication Summary         

                          

                                        Completed                   2017  

              

 

                    Care Plan: MAMMOGRAM SCREENING                              

         INC : 

[id number]

                          Pending                   2017                

 

                                        Visit Diagnosis Plan: Acute upper respir

atory infection, unspecified            

                                        Discussion: Exam is reassuring Likely vi

ral illness Supportive care 

reviewed and encouraged Follow up PRN

                                        ICD-9 : 465.9

ICD-10 : J06.9

                                                    2017                

 

                                        Appointment: Luba Stevenson 

                                        [address] [place], [place] [id number]                                                           ACUTE ILLNESS      

 

                                        2017                

 

                          Patient Education: Patient Medication Summary         

                          

                                        Completed                   2017  

              

 

                          Visit Plan:                    Supportive care. Rest, 

Fluids, Tylenol/Motrin prn

fever or bodyaches. Notify if worsening symptoms.

                                                            2016          

  

   

 

                                        Appointment: Akanksha Driver

WPtel:[phone]

                                        [address] [place], [place] [id number]

                                                           ACUTE ILLNESS      

 

                                        2016                

 

                          Patient Education: Patient Medication Summary         

                          

                                        Completed                   2016  

              

 

                                        Appointment: Akanksha Driver

WPtel:[phone]

                                        [address] [place], [place] [id number]

US                                                           INJECTION          

 

                                        10/07/2016                

 

                          Patient Education: Patient Medication Summary         

                          

                                        Completed                   10/07/2016  

              

 

                                        Appointment: Akanksha Driver

WPtel:[phone]

                                        [address] [place], [place] [id number]

US                                                           LAB                

 

                                        2016                

 

                          Patient Education: Patient Medication Summary         

                          

                                        Completed                   2016  

              

 

                          Visit Plan:                    Add miralax daily Use d

aily probiotic and 

metamucil and push fluids Notify if worsens/persists

                                                            2016          

  

   

 

                                        Appointment: Akanksha Driver

WPtel:[phone] [address] [facility]

                        16 confirmed ~sl                                 

     

                          ACUTE ILLNESS                   2016            

    

 

                          Patient Education: Patient Medication Summary         

                          

                                        Completed                   2016  

              

 

                          Visit Plan:                    No NSAIDs Kidney functi

on discussed Discussed 

hydration Monitor lab every 4months so will check CMP, HbA1C next month

                                                            2016          

  

   

 

                          Visit Plan:                    No NSAIDs Kidney functi

on discussed Discussed 

hydration Monitor lab every 4months so will check CMP, HbA1C next month

                                                            2016          

  

   

 

                                        Appointment: Akanksha Driver

WPtel:[phone] [address] [facility]

                   03/15 confirmed ~sl                                       

ACUTE ILLNESS                           2016                

 

                          Patient Education: Patient Medication Summary         

                          

                                        Completed                   2016  

              

 

                          Visit Plan:                    Vestibular exercises Re

fill flonase Strict low Na

diet--discussed that needs to read labels Rx for walker given Has carotids and 
abdominal aorta and legs checked in January Check Chem 7

                                                            2015          

  

   

 

                          Visit Plan:                    Vestibular exercises Re

fill flonase Strict low Na

diet--discussed that needs to read labels Rx for walker with chair given due to 
muscle weakness/unsteadiness Has carotids and abdominal aorta and legs checked 
in January Check Chem 7

                                                            2015          

  

   

 

                          Visit Plan:                    Vestibular exercises Re

fill flonase Strict low Na

diet--discussed that needs to read labels Rx for walker given Has carotids and 
abdominal aorta and legs checked in January Check Chem 7

                                                            2015          

  

   

 

                          Visit Plan:                    Vestibular exercises Re

fill flonase Strict low Na

diet--discussed that needs to read labels Rx for walker with chair given due to 
muscle weakness/unsteadiness Has carotids and abdominal aorta and legs checked 
in January Check Chem 7

                                                            2015          

  

   

 

                                        Appointment: Akanksha Driver

WPtel:[phone] [address] [facility]

                        12/15/15 appt confirmed cn                            

     

                          FOLLOW UP                   2015                

 

                          Patient Education: Patient Medication Summary         

                          

                                        Completed                   2015  

              

 

                    Patient Education: Vertigo                                  

                     

                          Completed                   2015                

 

                                        Appointment: Akanksha Driver

WPtel:[phone]

                                        [address] [place], [place] [id number]

[place]                                                           INJECTION          

 

                                        10/16/2015                

 

                          Patient Education: Patient Medication Summary         

                          

                                        Completed                   10/16/2015  

              

 

                                        Appointment: Akanksha Driver

WPtel:[phone]

                                        [address] [place], [place] [id number]                                                           UA                 

 

                                        09/15/2015                

 

                          Patient Education: Patient Medication Summary         

                          

                                        Completed                   09/15/2015  

              

 

                                        Referral: Landon De La Torre

WPtel:[phone]

#[facility] Uma Vásquez

[id number]

[place]                   Referral                                       Completed   

                                        2015                

 

                                        Appointment: Akanksha Driver

WPtel:[phone]

                                        [address] [place] [address]                                                           LAB                

 

                                        09/10/2015                

 

                          Patient Education: Patient Medication Summary         

                          

                                        Completed                   09/10/2015  

              

 

                          Visit Plan:                    Check CMP, CBC, TSH, Fr

ee T4, B12, Lipids, HbA1C 

Discussed 6 small meals a day each with protein Would likely benefit from 
antidepressant Update colonoscopy

                                                            2015          

  

   

 

                                        Appointment: Akanksha Driver

WPtel:[phone]

                                        [address] [place] [address]                   9/8/15 confirmed                                       

ACUTE ILLNESS                           2015                

 

                          Patient Education: Patient Medication Summary         

                          

                                        Completed                   2015  

              

 

                          Visit Plan:                    Cerumen flush with warm

 water and peroxide - good

results Resume Nasonex nasal spray daily Recommended Debrox earwax removal drops

                                                            2015          

  

   

 

                          Visit Plan:                    Cerumen flush with warm

 water and peroxide - good

results Resume Nasonex nasal spray daily Recommended Debrox earwax removal drops

                                                            2015          

  

   

 

                                        Appointment: Bertha Mon

WPtel:[phone]

                                        [address] [place] [address]                                                           ACUTE ILLNESS      

 

                                        2015                

 

                          Patient Education: Patient Medication Summary         

                          

                                        Completed                   2015  

              

 

                          Visit Plan:                    Continue aspirin daily 

Add meloxicam for 1week 

Elevate and Ice Call on 2015          

  

   

 

                                        Appointment: Akanksha Driver

WPtel:[phone]

                                        [address] [place] [address]                                                           ACUTE ILLNESS      

 

                                        2015                

 

                          Patient Education: Patient Medication Summary         

                          

                                        Completed                   2015  

              

 

                          Visit Plan:                    Been using baclofen at 

bedtime and has helped 

some PT for next 2-4weeks

                                                            2015          

  

   

 

                                        Appointment: Akanksha Driver

WPtel:[phone]

                                        [address] [place] [address]                                                           Hospital Follow Up 

 

                                        2015                

 

                          Patient Education: Patient Medication Summary         

                          

                                        Completed                   2015  

              

 

                                        Appointment: Akanksha Driver

WPtel:[phone]

                                        [address] [place], [place] [id number]                                                           LAB                

 

                                        2015                

 

                                        Appointment: Akanksha Driver

WPtel:[phone]

                                        [address] [place] [address]                        feeling better, weather -                           

     

                          FOLLOW UP                   2015                

 

                                        Referral: Landon De La Torre

WPtel:[phone]

[facility] [address]                   Referral                                       Appointment 

Requested                               2015                

 

                          Visit Plan:                    Continue exercise and c

urrent meds See surgery 

for removal of right arm lesion

                                                            2015          

  

   

 

                                        Appointment: Akanksha Driver

WPtel:[phone]

                                        [address] [place] [address]                                                           FOLLOW UP          

 

                                        2015                

 

                          Patient Education: Patient Medication Summary         

                          

                                        Completed                   2015  

              

 

                                        Appointment: Akanksha Driver

WPtel:[phone]

                                        [address] [place] [address]                                                           INJECTION          

 

                                        10/17/2014                

 

                          Patient Education: Patient Medication Summary         

                          

                                        Completed                   10/17/2014  

              

 

                          Visit Plan:                    Resume Claritin PO kathleen

y May take Ibuprofen PM at

night for sleep Continue Flonase 2 sprays each nostril bid Complete prednisone 
20 mg PO bid

                                                            2014          

  

   

 

                                        Appointment: Bertha Mon

WPtel:[phone]

                                        [address] [place] [address]                                                           ACUTE ILLNESS      

 

                                        2014                

 

                          Patient Education: Patient Medication Summary         

                          

                                        Completed                   2014  

              

 

                          Visit Plan:                    Discussed that likely l

umbar etiology for leg 

weakness Will change amlodopine to low dose dyazide and see if helps legs and 
inner ear

                                                            2014          

  

   

 

                                        Appointment: Akanksha Driver

WPtel:[phone]

                                        [address] [place] [address]                                                           FOLLOW UP          

 

                                        2014                

 

                          Patient Education: Patient Medication Summary         

                          

                                        Completed                   2014  

              

 

                          Visit Plan:                    Ceftin and continue cla

ritin/flonase Decrease 

amlodopine to 2.5mg q HS until fwup with Card due to weakness

                                                            2014          

  

   

 

                                        Appointment: Akanksha Driver

WPtel:[phone]

                                        [address] [place] [address]                                                           ACUTE ILLNESS      

 

                                        2014                

 

                          Patient Education: Patient Medication Summary         

                          

                                        Completed                   2014  

              

 

                                        Appointment: Akanksha Driver

WPtel:[phone]

                                        [address] [place] [address]

[place]                                                           LAB                

 

                                        2014                

 

                          Patient Education: Patient Medication Summary         

                          

                                        Completed                   2014  

              

 

                          Visit Plan:                    States she is having he

r carotids "done" this 

14 Return this week for fasting labs. CBC, CMP, TSH Free T4, 
Lipid Panel and HgbA1C.

                                                            2014          

  

   

 

                                        Appointment: Bertha Mon

WPtel:[phone]

                                        [address] [place] [address]                                                           ACUTE ILLNESS      

 

                                        2014                

 

                          Patient Education: Patient Medication Summary         

                          

                                        Completed                   2014  

              

 

                          Visit Plan:                    Supportive care. Rest, 

Fluids, Tylenol prn fever 

or bodyaches. Notify if worsening symptoms. Ceftin

                                                            10/15/2013          

  

   

 

                                        Appointment: Bertha Mon

WPtel:[phone]

                                        [address] [place], [place] [id number]

                                                           ACUTE ILLNESS      

 

                                        10/15/2013                

 

                          Patient Education: Patient Medication Summary         

                          

                                        Completed                   10/15/2013  

              

 

                                        Appointment: Akanksha Driver

WPtel:[phone]

                                        [address] [place], [place] [id number]

US                                                           BP CHECK           

 

                                        2013                

 

                          Patient Education: Patient Medication Summary         

                          

                                        Completed                   2013  

              

 

                          Visit Plan:                    Continue increased dose

 of metoprolol Moniter BP 

at home BP check in 1wk Xanax refill to use prn

                                                            2013          

  

   

 

                          Visit Plan:                    Continue increased dose

 of metoprolol Moniter BP 

at home BP check in 1wk

                                                            2013          

  

   

 

                                        Appointment: Akanksha Driver

WPtel:[phone]

                                        [address] [place] [address]                                                           ER Follow UP       

 

                                        2013                

 

                          Patient Education: Patient Medication Summary         

                          

                                        Completed                   2013  

              

 

                          Visit Plan:                    Restart flonase BID Use

 meclizine 25mg q HS 

Vestibular exercises Claritin 10mg q AM See ENT if doesn't resolve

                                                            2013          

  

   

 

                                        Appointment: Akanksha Drvier

WPtel:[phone]

                                        [address] [place] [address]                                                           ACUTE ILLNESS      

 

                                        2013                

 

                          Patient Education: Patient Medication Summary         

                          

                                        Completed                   2013  

              

 

                          Visit Plan:                    Will continue to observ

e

                                                            2013          

  

   

 

                                        Appointment: Akanksha Driver

WPtel:[phone]

                                        [address] [place] [address]                                                           FOLLOW UP          

 

                                        2013                

 

                          Patient Education: Patient Medication Summary         

                          

                                        Completed                   2013  

              

 

                          Visit Plan:                    ERx for Augmentin 875mg

 q12 x 10 days and Floxin 

otic drops 5 gtts AD x7days Sample of Nasonex per pt request Discussed treatment
(nasal saline, salt water gargles, keeping right ear clean and dry, for 
worsening go to UC on weekend, Tylenol/ibuprofen, etc.) RTC 2 weeks for ear 
recheck.

                                                            05/10/2013          

  

   

 

                                        Appointment: Jill Jean 

WPtel:[phone]

                                        [address] [place] [address]                                                           ACUTE ILLNESS      

 

                                        05/10/2013                

 

                          Patient Education: Patient Medication Summary         

                          

                                        Completed                   05/10/2013  

              

 

                          Visit Plan:                    Decrease caffeine intak

e Check Bilateral 

Mammogram with US of left breast

                                                            2013          

  

   

 

                                        Appointment: Akanksha Driver

WPtel:[phone]

                                        [address] [place] [address]                                                           ACUTE ILLNESS      

 

                                        2013                

 

                          Patient Education: Patient Medication Summary         

                          

                                        Completed                   2013  

              

 

                                        Appointment: Kate Hall

WPtel:[phone]

                                        [address] [place], [place] [id number]

                   appt scheduled                                        

ACUTE ILLNESS                           2013                

 

                                        Appointment: Akanksha Driver

WPtel:[phone]

                                        [address] [place], [place] [id number]                                                           LAB                

 

                                        2012                

 

                          Patient Education: Patient Medication Summary         

                          

                                        Completed                   2012  

              

 

                          Visit Plan:                    Continue off carafate a

nd see how does Continue 

probiotic Add Vestibular exercises and use meclizine prn Continue Nasonex

                                                            2012          

  

   

 

                          Visit Plan:                    Continue off carafate a

nd see how does Continue 

probiotic Add Vestibular exercises and use meclizine prn Continue Nasonex Check 
fasting lab including CMP, Lipids, CBC, TSH, Free T4, HbA1C

                                                            2012          

  

   

 

                                        Appointment: Akanksha Driver

WPtel:[phone]

                                        [address] [place], [place] [id number]                                                           FOLLOW UP          

 

                                        2012                

 

                          Patient Education: Patient Medication Summary         

                          

                                        Completed                   2012  

              

 

                          Visit Plan:                    Continue carafate for 4

more weeks at current dose

then decrease to BID for 2wks then q HS

                                                            10/23/2012          

  

   

 

                                        Appointment: Akanksha Driver

WPtel:[phone]

                                        [address] [place], [place] [id number]                                                           FOLLOW UP          

 

                                        10/23/2012                

 

                          Patient Education: Patient Medication Summary         

                          

                                        Completed                   10/23/2012  

              

 

                          Visit Plan:                    Continue omeprazole Add

 Carafate 1gm po q AC Add 

daily probiotic

                                                            10/10/2012          

  

   

 

                                        Appointment: Akanksha Driver

WPtel:[phone]

                                        [address] [place], [place] [id number]

                                                           ACUTE ILLNESS      

 

                                        10/10/2012                

 

                          Patient Education: Patient Medication Summary         

                          

                                        Completed                   10/10/2012  

              

 

                                        Appointment: Akanksha Driver

WPtel:[phone]

                                        [address] [place], [place] [id number]

US                                                           INJECTION          

 

                                        2012                

 

                          Patient Education: Patient Medication Summary         

                          

                                        Completed                   2012  

              

 

                          Visit Plan:                    Diabetic Diet Accucheck

s BID alternating times 

Hold onglyza and Januvia for now and continue diet and exercise and weight loss 
and monitangela BS Discussed hypoglycemia and snack of peanut butter and crackers 
with juice or milk

                                                            2012          

  

   

 

                                        Appointment: Akanksha Driver

WPtel:[phone]

                                        [address] [place] [address]                                                           WORK IN            

 

                                        2012                

 

                          Patient Education: Patient Medication Summary         

                          

                                        Completed                   2012  

              

 

                                        Appointment: Akanksha Driver

WPtel:[phone]

                                        [address] [place], [place] [id number]                                                           UA                 

 

                                        2012                

 

                          Patient Education: Patient Medication Summary         

                          

                                        Completed                   2012  

              

 

                                        Appointment: Akanksha Driver

WPtel:[phone]

                                        [address] [place] [address]                                                           LAB                

 

                                        2012                

 

                          Patient Education: Patient Medication Summary         

                          

                                        Completed                   2012  

              

 

                          Visit Plan:                    Fasting lab to check CM

P, Lipids, CBC, TSH, Free 

T4, HbA1C, Insulin level Hold Zocor for next 2wks

                                                            2012          

  

   

 

                                        Appointment: Akanksha Driver

WPtel:[phone]

                                        [address] [place] [address]                                                           ACUTE ILLNESS      

 

                                        2012                

 

                          Patient Education: Patient Medication Summary         

                          

                                        Completed                   2012  

              

 

                          Visit Plan:                    Injection to Right SI j

oint as above Pt will call

in 3 days on pain Has PT starting in 2wks.

                                                            2012          

  

   

 

                                        Appointment: Akanksha Driver

WPtel:[phone]

                                        [address] [place] [address]                                                           ACUTE ILLNESS      

 

                                        2012                

 

                          Patient Education: Patient Medication Summary         

                          

                                        Completed                   2012  

              

 

                          Visit Plan:                    OMT done Start PT May n

eed updated MRI if need to

consider epidural Prednisone

                                                            2012          

  

   

 

                                        Appointment: Akanksha Driver

WPtel:[phone]

                                        [address] [place] [address]                                                           OMT                

 

                                        2012                

 

                          Patient Education: Patient Medication Summary         

                          

                                        Completed                   2012  

              

 

                          Visit Plan:                    Flexeril and Vimovo OMT

 done Daily stretches

                                                            2012          

  

   

 

                                        Appointment: Akanksha Driver

WPtel:[phone]

                                        [address] [place] [address]                                                           ACUTE ILLNESS      

 

                                        2012                

 

                          Patient Education: Patient Medication Summary         

                          

                                        Completed                   2012  

              

 

                          Visit Plan:                    OMT done Daily stretche

s Moist heat or biofreeze 

Right SI joint injection Call in 1week Vimovo BID

                                                            2012          

  

   

 

                                        Appointment: Akanksha Drivertel:[phone]

                                        [address] [place], [place] [id number]                                                           ACUTE ILLNESS      

 

                                        2012                

 

                          Patient Education: Patient Medication Summary         

                          

                                        Completed                   2012  

              

 

                                        Appointment: Akanksha Driver

WPtel:[phone]

                                        [address] [place], [place] [id number]

US                                                           LAB                

 

                                        2012                

 

                          Patient Education: Patient Medication Summary         

                          

                                        Completed                   2012  

              

 

                          Visit Plan:                    Decrease amlodopine to 

1.25mg daily Schedule with

Cardiology Fasting lab in AM

                                                            2012          

  

   

 

                                        Appointment: Akanksha Driver

WPtel:[phone]

                                        [address] [place] [address]                                                           ACUTE ILLNESS      

 

                                        2012                

 

                          Patient Education: Patient Medication Summary         

                          

                                        Completed                   2012  

              

 

                          Visit Plan:                    Saline nasal flushes pr

n. Tylenol/Motrin prn 

headache. Notify if persists/symptoms worsening. Cerumen removal from ears as 
above

                                                            2011          

  

   

 

                                        Appointment: Akanksha Driver

WPtel:[phone]

                                        [address] [place], [place] [id number]

                                                           ACUTE ILLNESS      

 

                                        2011                

 

                          Patient Education: Patient Medication Summary         

                          

                                        Completed                   2011  

              

 

                                        Appointment: Akanksha Driver

WPtel:[phone]

                                        [address] [place], [place] [id number]

US                                                           INJECTION          

 

                                        10/05/2011                

 

                          Patient Education: Patient Medication Summary         

                          

                                        Completed                   10/05/2011  

              

 

                          Visit Plan:                    Continue vimovo for 1mo

re week Increase 

Omeprazole to BID for 1mo then resume QD if stomach improved Vestibular 
exercises with meclizine q HS for next week

                                                            2011          

  

   

 

                                        Appointment: Akanksha Driver

WPtel:[phone]

                                        [address] [place] [address]                                                           FOLLOW UP          

 

                                        2011                

 

                          Patient Education: Patient Medication Summary         

                          

                                        Completed                   2011  

              

 

                          Visit Plan:                    Trial of Vimovo 50/200m

g po BID Check UA

                                                            2011          

  

   

 

                                        Appointment: Akanksha Driver

WPtel:[phone]

                                        [address] [place] [address]                                                           ACUTE ILLNESS      

 

                                        2011                

 

                          Patient Education: Patient Medication Summary         

                          

                                        Completed                   2011  

              

 

                                        Appointment: Akanksha Driver

WPtel:[phone]

                                        [address] [place] [address]                                                           LAB                

 

                                        2011                

 

                          Patient Education: Patient Medication Summary         

                          

                                        Completed                   2011  

              

 

                          Visit Plan:                    Saline nasal flushes pr

n. Tylenol/Motrin prn 

headache. Notify if persists/symptoms worsening. Add Veramyst Increase 
Omeprazole to 20mg po BID

                                                            2011          

  

   

 

                                        Appointment: Akanksha Driver

WPtel:[phone]

                                        [address] [place] [address]                                                           ACUTE ILLNESS      

 

                                        2011                

 

                          Patient Education: Patient Medication Summary         

                          

                                        Completed                   2011  

              

 

                          Visit Plan:                    Injection to right SI j

oint as above Continue 

Vimovo Daily stretches Pt will call at end of week to let us know how back is 
doing

                                                            2011          

  

   

 

                                        Appointment: Akanksha Driver

WPtel:[phone]

                                        [address] [place] [address]                                                           FOLLOW UP          

 

                                        2011                

 

                          Patient Education: Patient Medication Summary         

                          

                                        Completed                   2011  

              

 

                          Visit Plan:                    Toradol 30mg IM now x1 

Vimovo 200/50 po BID 

Decrease Norvasc to 1/2 tablet daily Increase Lexaprol to 10mg QD

                                                            2011          

  

   

 

                                        Appointment: Akanksha Driver

WPtel:[phone]

                                        [address] [place] [address]                                                           ACUTE ILLNESS      

 

                                        2011                

 

                          Patient Education: Patient Medication Summary         

                          

                                        Completed                   2011  

              

 

                          Visit Plan:                    Nasocourt sample given.

 Pt. will notify if 

symptoms are worse on Monday.

                                                            2010          

  

   

 

                                        Appointment: Kate Hall

WPtel:[phone]

                                        [address] [place] [address]                                                           ACUTE ILLNESS      

 

                                        2010                

 

                          Patient Education: Patient Medication Summary         

                          

                                        Completed                   2010  

              

 

                                        Appointment: Akanksha Driver

WPtel:[phone]

                                        [address] [place], [place] [id number]

US                                                           INJECTION          

 

                                        10/06/2010                

 

                          Patient Education: Patient Medication Summary         

                          

                                        Completed                   10/06/2010  

              

 

                          Visit Plan:                    Cipro for UTI Cont Chad

pro at 5mg QD Flu shot 

next week

                                                            2010          

  

   

 

                                        Appointment: Akanksha Driver

WPtel:[phone]

                                        [address] [place], [place] [id number]

                                                           FOLLOW UP          

 

                                        2010                

 

                          Patient Education: Patient Medication Summary         

                          

                                        Completed                   2010  

              

 

                    Patient Education: Lexapro                                  

                     

                          Completed                   2010                

 

                          Visit Plan:                    May proceed with hiatal

 hernia repair per Card. 

so will contact Dr. Cash's office to proceed with surgery Trial of 
Lexapro 5mg QD plus use xanax prn

                                                            2010          

  

   

 

                          Visit Plan:                    May proceed with hiatal

 hernia repair per Card. 

so will contact Dr. Cash's office to proceed with surgery

                                                            2010          

  

   

 

                                        Appointment: Akanksha Driver

WPtel:[phone]

                                        [address] [place], [place] [id number]                                                           FOLLOW UP          

 

                                        2010                

 

                          Patient Education: Patient Medication Summary         

                          

                                        Completed                   2010  

              

 

                          Visit Plan:                    B12 given Cont oral B12

 and iron Fwup 1mo for B12

Proceed with hiatal hernia repair once card clearance

                                                            2010          

  

   

 

                                        Appointment: Akanksha Drivertel:[phone]

                                        [address] [place], [place] [id number]

                                                           FOLLOW UP          

 

                                        2010                

 

                          Patient Education: Patient Medication Summary         

                          

                                        Completed                   2010  

              

 

                          Visit Plan:                    No caffeine, no nicotin

e, no mints, no late 

meals, elevate HOB 30 degrees Cont. with Nexium See Card to discuss Hiatal 
Hernia Repair B12 given

                                                            2010          

  

   

 

                                        Appointment: Akanksha Driver

WPtel:[phone]

                                        [address] [place], [place] [id number]

                                                           FOLLOW UP          

 

                                        2010                

 

                          Patient Education: Patient Medication Summary         

                          

                                        Completed                   2010  

              

 

                                        Appointment: Akanksha Driver

WPtel:[phone] [address] [facility]

[facility]                                                           LAB                

 

                                        2010                

 

                          Patient Education: Patient Medication Summary         

                          

                                        Completed                   2010  

              

 

                          Visit Plan:                    Check fasting lab in AM

--CMP,Lipids, CBC, Vit D, 

TSH,FreeT4, B12

                                                            2010          

  

   

 

                                        Appointment: Akanksha Driver

WPtel:[phone] [address] [facility]

                                                           FOLLOW UP          

 

                                        2010                

 

                          Patient Education: Patient Medication Summary         

                          

                                        Completed                   2010  

              

 

                                        Referral: Landon De La Torre

WPtel:[phone]

[address] [id number]

                   Referral                                       Appointment 

Requested                                               

 

                                        Referral: Landon De La Torre

WPtel:[phone]

[facility] [facility]

                   Referral                                       Initiated   

                                                        



                                                                                
                                                                                
                                                                                
                                                                                
                                                                                
                                                                                
                                                                                
                                                                                
                                                                                
                                                                                
                                                                                
                                                                                
                                                                                
                                                                                
                                                                                
                                                                                
                                                                                
                                                                                
                                                                                
                                                                                
                                                                                
                                                                                
                                                                  



Instructions

                      



                                        Comment                

 

                                                            . ERx for Augmentin 

875mg q12 x 10 days and Floxin otic 

drops 5 gtts AD x7days

Sample of Nasonex per pt request

Discussed treatment (nasal saline, salt water gargles, keeping right ear clean 
and dry, for worsening go to UC on weekend, Tylenol/ibuprofen, etc.)

RTC 2 weeks for ear recheck.                                  

 

                                                            . Check fasting lab 

in AM--CMP,Lipids, CBC, Vit D, 

TSH,FreeT4, B12                                  

 

                                                            . Been using baclofe

n at bedtime and has helped some

PT for next 2-4weeks

                                  

 

                                                            .  Saline nasal flus

hes prn. Tylenol/Motrin prn headache.  

Notify if persists/symptoms worsening.

Cerumen removal from ears as above

                                  

 

                                                            . B12 given

Cont oral B12 and iron

Fwup 1mo for B12

Proceed with hiatal hernia repair once card clearance                           
      

 

                                                            . States she is havi

ng her carotids "done" this 14

Return this week for fasting labs.  CBC, CMP, TSH Free T4, Lipid Panel and 
HgbA1C.                                  

 

                                                            . Continue omeprazol

e

Add Carafate 1gm po q AC

Add daily probiotic

                                  

 

                                                            . Continue aspirin d

aily

Add meloxicam for 1week

Elevate and Ice

Call on Monday                                  

 

                                                            . Add miralax daily

Use daily probiotic and metamucil and push fluids

Notify if worsens/persists                                  

 

                                                            . Continue increased

 dose of metoprolol

Moniter BP at home

BP check in 1wk

Xanax refill to use prn                                  

 

                                                            . Continue increased

 dose of metoprolol

Moniter BP at home

BP check in 1wk                                  

 

                                                            . Restart flonase BI

D

Use meclizine 25mg q HS

Vestibular exercises

Claritin 10mg q AM

See ENT if doesn't resolve                                  

 

                                                            Right SI joint clean

sed with alchohol and betadine and 

injected with 3cc 1% lidocaine with 40mg depomedrol and 40mg kenalog, tolerated 
well with no complications, neosporin and bandage applied. OMT done

Daily stretches

Moist heat or biofreeze

Right SI joint injection

Call in 1week

Vimovo BID                                  

 

                                                            . Fasting lab to judith

ck CMP, Lipids, CBC, TSH, Free T4, 

HbA1C, Insulin level

Hold Zocor for next 2wks                                  

 

                                                            . Discussed that lik

ely lumbar etiology for leg weakness

Will change amlodopine to low dose dyazide and see if helps legs and inner ear  
                               

 

                                                            . Cipro for UTI

Cont Lexapro at 5mg QD

Flu shot next week                                  

 

                                                            . Will continue to o

bserve

                                  

 

                                                            . May proceed with h

iatal hernia repair per Card. so will 

contact Dr. Cash's office to proceed with surgery



Trial of Lexapro 5mg QD plus use xanax prn                                  

 

                                                            . May proceed with h

iatal hernia repair per Card. so will 

contact Dr. Cash's office to proceed with surgery                        
         

 

                                                            . OMT done

Start PT

May need updated MRI if need to consider epidural

Prednisone                                  

 

                                                            . Toradol 30mg IM no

w x1

Vimovo 200/50 po BID

Decrease Norvasc to 1/2 tablet daily

Increase Lexaprol to 10mg QD                                  

 

                                                            . Decrease amlodopin

e to 1.25mg daily

Schedule with Cardiology

Fasting lab in AM                                  

 

                                                            . Resume Claritin PO

 daily

May take Ibuprofen PM at night for sleep

Continue Flonase 2 sprays each nostril bid

Complete prednisone 20 mg PO bid



                                  

 

                                                            . Saline nasal flush

es prn. Tylenol/Motrin prn headache.  

Notify if persists/symptoms worsening.

Add Veramyst

Increase Omeprazole to 20mg po BID                                  

 

                                                            . Nasocourt sample g

iven.  Pt. will notify if symptoms are 

worse on Monday.                                   

 

                                                            . No NSAIDs

Kidney function discussed

Discussed hydration 

Monitor lab every 4months so will check CMP, HbA1C next month                   
              

 

                                                            . No NSAIDs

Kidney function discussed

Discussed hydration 

Monitor lab every 4months so will check CMP, HbA1C next month                   
              

 

                                                            . Vestibular exercis

es

Refill flonase

Strict low Na diet--discussed that needs to read labels

Rx for walker given

Has carotids and abdominal aorta and legs checked in January

Check Chem 7                                  

 

                                                            . Vestibular exercis

es

Refill flonase

Strict low Na diet--discussed that needs to read labels

Rx for walker with chair given due to muscle weakness/unsteadiness

Has carotids and abdominal aorta and legs checked in January

Check Chem 7

                                  

 

                                                            . Vestibular exercis

es

Refill flonase

Strict low Na diet--discussed that needs to read labels

Rx for walker given

Has carotids and abdominal aorta and legs checked in January

Check Chem 7                                  

 

                                                            . Vestibular exercis

es

Refill flonase

Strict low Na diet--discussed that needs to read labels

Rx for walker with chair given due to muscle weakness/unsteadiness

Has carotids and abdominal aorta and legs checked in January

Check Chem 7

                                  

 

                                                            . Injection to right

 SI joint as above

Continue Vimovo

Daily stretches

Pt will call at end of week to let us know how back is doing                    
             

 

                                                            . Cerumen flush with

 warm water and peroxide - good results 

Resume Nasonex nasal spray daily

Recommended Debrox earwax removal drops                                   

 

                                                            . Cerumen flush with

 warm water and peroxide - good results 

Resume Nasonex nasal spray daily

Recommended Debrox earwax removal drops                                   

 

                                                            . Continue vimovo fo

r 1more week

Increase Omeprazole to BID for 1mo then resume QD if stomach improved

Vestibular exercises with meclizine q HS for next week                          
       

 

                                                            . Diabetic Diet

Accuchecks BID alternating times

Hold onglyza and Januvia for now and continue diet and exercise and weight loss 
and moniter BS

Discussed hypoglycemia and snack of peanut butter and crackers with juice or 
milk                                  

 

                                                            . Continue exercise 

and current meds

See surgery for removal of right arm lesion                                  

 

                                                            . Continue off caraf

ate and see how does

Continue probiotic

Add Vestibular exercises and use meclizine prn

Continue Nasonex                                  

 

                                                            Left ear flushed wit

h warm water and peroxide with ear 

syringe and then last removed with currette--peroxide drops instilled.  
Tolerated well, no complications, TM intact.. Continue off carafate and see how 
does

Continue probiotic

Add Vestibular exercises and use meclizine prn

Continue Nasonex

Check fasting lab including CMP, Lipids, CBC, TSH, Free T4, HbA1C               
                  

 

                                                            . Supportive care.  

Rest, Fluids, Tylenol/Motrin prn fever 

or bodyaches.  Notify if worsening symptoms.

                                  

 

                                                            . Continue carafate 

for 4more weeks at current dose then 

decrease to BID for 2wks then q HS                                  

 

                                                            . Decrease caffeine 

intake

Check Bilateral Mammogram with US of left breast                                
 

 

                                                            . Trial of Vimovo 50

/200mg po BID

Check UA                                  

 

                                                            . Flexeril and Vimov

o

OMT done

Daily stretches                                  

 

                                                            . No caffeine, no ni

cotine, no mints, no late meals, elevate

HOB 30 degrees

Cont. with Nexium

See Card to discuss Hiatal Hernia Repair

B12 given                                  

 

                                                            .  Supportive care. 

 Rest, Fluids, Tylenol prn fever or 

bodyaches.  Notify if worsening symptoms.

Ceftin                                  

 

                                                            . Ceftin and continu

e claritin/flonase

Decrease amlodopine to 2.5mg q HS until fwup with Card due to weakness          
                       

 

                                                            . Check CMP, CBC, TS

H, Free T4, B12, Lipids, HbA1C

Discussed 6 small meals a day each with protein

Would likely benefit from antidepressant 

Update colonoscopy                                  

 

                                                            Informed Consent obt

ainded.  Right SI joint cleansed with 

alcohol and betadine and injected with 3cc 1%l lidocaine with 40mg depomedrol 
and 40mg kenalog, tolerated well with no complications, neosporin and bandage 
applied. Injection to Right SI joint as above

Pt will call in 3 days on pain

Has PT starting in 2wks.

## 2020-02-19 NOTE — XMS REPORT
CCD document using C-CDA

                             Created on: 2020



Leilani Ramírez

External Reference #: [id number]

: 1940

Sex: Female



Demographics





                          Address                   [address] [place]  [address]

 

                          Home Phone                [phone]

 

                          Preferred Language        Unknown

 

                          Marital Status            

 

                          Sikh Affiliation     Unknown

 

                          Race                      White

 

                          Ethnic Group              Not  or 





Author





                          Author                    Leilani Driver D.O.

 

                          Organization              [facility]

 

                          Address                   [address] [place]  [address]



 

                          Phone                     [phone]







Care Team Providers





                    Care Team Member Name Role                Phone

 

                    Akanksha Driver D.O., PP                  Unavailable

 

                          CCM                       Unavailable







Summary Purpose

Interface Exchange



Insurance Providers





             Payer name   Policy type / Coverage type Covered party ID Effective

 Begin Date 

Effective End Date

 

             [place] [place] [id number]  2018   

  Unknown

 

             [employer] Senior Supplemental Medicare Part B [id number]   [id number]    

 Unknown







Family history





Father



                          Diagnosis                 Age At Onset

 

                          Heart disease             Unknown







Mother



                          Diagnosis                 Age At Onset

 

                          Heart disease             Unknown

 

                          Cancer                    Unknown







Social History





                Social History Element Codes           Description     Effective

 Dates

 

                Tobacco history SNOMED CT: 020809789 Never smoker    2011

 

                Marital status  Unknown         Single          2010

 

                Number of children Unknown         9               2010







Allergies, Adverse Reactions, Alerts





           Substance  Reaction   Codes      Entered Date Inactivated Date Status

 

           _                     Unknown    2019 No Inactive Date Active

 

           * NO KNOWN FOOD ALLERGIES            Unknown    2010 No Inactiv

e Date Active

 

           _                     Unknown    2010 No Inactive Date Active

 

           QUINIDINE-QUININE ANALOGUES hives,     Unknown    2010 No Inact

diamante Date Active







Problems





                Condition       Codes           Effective Dates Condition Status

 

                          Essential hypertension    ICD-9: 401.9

ICD-10: I10               2014                Active

 

                          Palpitations              ICD-9: 785.1

ICD-10: R00.2             10/02/2018                Active

 

                          Stress reaction           ICD-9: 308.9

ICD-10: F43.0             2020                Active

 

                          Mixed hyperlipidemia      ICD-9: 272.4

ICD-10: E78.2             2019                Active

 

                          FLU VACCINE               ICD-9: V04.81

ICD-10: Z23               2012                Active

 

                          Acute serous otitis media, left ear ICD-9: 381.01

ICD-10: H65.02            2019                Active

 

                          Coronary atherosclerosis due to calcified coronary les

ion ICD-9: 414.00

ICD-10: I25.84            2018                Active

 

                          Hypoglycemia, unspecified ICD-9: 251.2

ICD-10: E16.2             2017                Active

 

                          Other fatigue             ICD-9: 780.79

ICD-10: R53.83            2017                Active

 

                          Urinary tract infection, site not specified ICD-9: 599

.0

ICD-10: N39.0             10/02/2018                Active

 

                          Gastro-esophageal reflux disease without esophagitis I

CD-9: 530.81

ICD-10: K21.9             2018                Active

 

                          Epigastric pain           ICD-9: 789.06

ICD-10: R10.13            2018                Active

 

                          Atherosclerotic heart disease of native coronary arter

y without angina pectoris 

ICD-9: 414.00

ICD-10: I25.10            2018                Active

 

                          Generalized anxiety disorder ICD-9: 300.00

ICD-10: F41.1             2018                Active

 

                          Dizziness and giddiness   ICD-9: 780.4

ICD-10: R42               2014                Active

 

                          Occlusion and stenosis of bilateral carotid arteries I

CD-9: 433.10

ICD-10: I65.23            2018                Active

 

                          Cervicalgia               ICD-9: 723.1

ICD-10: M54.2             2018                Active

 

                          Other spondylosis with radiculopathy, cervical region 

ICD-9: 721.0

ICD-10: M47.22            2018                Active

 

                          Cervicocranial syndrome   ICD-9: 723.2

ICD-10: M53.0             2018                Active

 

                          Vertigo of central origin, bilateral ICD-9: 386.2

ICD-10: H81.43            2018                Active

 

                          Benign paroxysmal vertigo, bilateral ICD-9: 386.11

ICD-10: H81.13            2018                Active

 

                          Otalgia, bilateral        ICD-9: 388.70

ICD-10: H92.03            2018                Active

 

                          Left lower quadrant pain  ICD-9: 789.04

ICD-10: R10.32            2017                Active

 

                          Low back pain             ICD-9: 724.2

ICD-10: M54.5             2017                Active

 

                          Radiculopathy, lumbosacral region ICD-9: 724.4

ICD-10: M54.17            2018                Active

 

                          Impaired fasting glucose  ICD-9: 790.29

ICD-10: R73.01            2012                Active

 

                          Other intervertebral disc degeneration, lumbar region 

ICD-9: 722.52

ICD-10: M51.36            2017                Active

 

                          Pain in thoracic spine    ICD-9: 724.1

ICD-10: M54.6             2017                Active

 

                          Mastodynia                ICD-9: 611.71

ICD-10: N64.4             2017                Active

 

                          Acute upper respiratory infection, unspecified ICD-9: 

465.9

ICD-10: J06.9             2017                Active

 

                          Acute mastoiditis without complications, right ear ICD

-9: 383.00

ICD-10: H70.001           2016                Active

 

                          Constipation, unspecified ICD-9: 564.00

ICD-10: K59.00            2016                Active

 

                          Chronic kidney disease, stage 1 ICD-9: 585.1

ICD-10: N18.1             03/15/2016                Active

 

                          History of falling        ICD-9: V15.88

ICD-10: Z91.81            12/15/2015                Active

 

                          Muscle weakness (generalized) ICD-9: 780.79

ICD-10: M62.81            2015                Active

 

                Acute renal failure ICD-9: 584.9    2015      Active

 

                POLYURIA        ICD-9: 788.42   2015      Active

 

                CAD             ICD-9: 414.00   09/10/2015      Active

 

                Hyperglycemia   ICD-9: 790.29   2012      Active

 

                HYPERLIPIDEMIA NEC/NOS ICD-9: 272.4    09/10/2015      Active

 

                ABDOMINAL PAIN  ICD-9: 789.00   10/10/2012      Active

 

                Grieving        ICD-9: 309.0    2015      Active

 

                HYPOGLYCEMIA    ICD-9: 251.2    2012      Active

 

                MALAISE AND FATIGUE ICD-9: 780.79   2015      Active

 

                CERUMEN IMPACTION ICD-9: 380.4    2015      Active

 

                Leg pain        ICD-9: 729.5    2015      Active

 

                SUPERFIC PHLEBITIS-LEG ICD-9: 451.0    2015      Active

 

                SPASM OF MUSCLE ICD-9: 728.85   2015      Active

 

                Thoracic back pain ICD-9: 724.1    2015      Active

 

                Benign positional vertigo ICD-9: 386.11   2015      Active

 

                Suspicious nevus ICD-9: 238.2    2015      Active

 

                EUSTACHIAN TUBE DYSFUNCTION ICD-9: 381.81   2014      Acti

ve

 

                SINUSITIS, ACUTE ICD-9: 461.9    2014      Active

 

                DIZZINESS/VERTIGO ICD-9: 780.4    2014      Active

 

                HYPERTENSION    ICD-9: 401.9    2014      Active

 

                URI, ACUTE      ICD-9: 465.9    10/15/2013      Active

 

                CEPHALGIA       ICD-9: 784.0    2013      Active

 

                OTITIS MEDIA NOS ICD-9: 382.9    2013      Active

 

                Perforation of ear drum ICD-9: 384.20   05/10/2013      Active

 

                Mastalgia       ICD-9: 611.71   2013      Active

 

                DYSPEPSIA       ICD-9: 536.8    10/10/2012      Active

 

                IBS             ICD-9: 564.1    10/10/2012      Active

 

                FLU VACCINE     ICD-9: V04.81   2012      Active

 

                Radiculopathy of leg ICD-9: 724.4    2012      Active

 

                SCIATICA        ICD-9: 724.3    2012      Active

 

                Lumbar degenerative disc disease ICD-9: 722.52   2012     

 Active

 

                Sacroiliac dysfunction ICD-9: 739.4    2012      Active

 

                Hypertension    Unknown         2012      Active

 

                PAIN, LOWER BACK ICD-9: 724.2    2011      Active

 

                SPINAL ENTHESOPATHY ICD-9: 720.1    2011      Active

 

                Hypotension     ICD-9: 458.9    2011      Active

 

                SACROILIITIS NEC ICD-9: 720.2    2011      Active

 

                PHARYNGITIS, ACUTE ICD-9: 462      2010      Active

 

                URINARY TRACT INFECTION ICD-9: 599.0    2010      Active

 

                Heat exhaustion ICD-9: 992.5    2010      Active

 

                Esophagitis     ICD-9: 530.10   2010      Active

 

                Gastritis       ICD-9: 535.50   2010      Active

 

                GERD            ICD-9: 530.81   2010      Active

 

                Hiatal hernia   ICD-9: 553.3    2010      Active

 

                B12 DEFIC ANEMIA NEC ICD-9: 281.1    2010      Active

 

                Anxiety         Unknown         2010      Active

 

                Heart disease   Unknown         2010      Active

 

                Hyperlipidemia  Unknown         2010      Active

 

                ANXIETY STATE NOS ICD-9: 300.00   2010      Active

 

                DEPRESSIVE DISORDER NEC ICD-9: 311      2010      Active







Medications





          Medication Codes     Instructions Start Date Stop Date Status    Fill 

Instructions

 

                    Flonase Allergy Relief 50 mcg/actuation nasal spray,suspensi

on RxNorm: 5164464     2

Spray Nasal every night at bedtime 2020      Inactive     

    

 

           simvastatin 40 mg tablet RxNorm: 291903 1 Tablet(s) PO QD 2019 

02/15/2020 

Active                                   

 

                omeprazole 40 mg capsule,delayed release RxNorm: 977254  1 Capsu

le(s) Oral QD 

2019          Active               

 

                Xanax 0.25 mg tablet RxNorm: 776653  TAKE 1 TABLET BY MOUTH TWIC

E DAILY 

10/29/2019          No Stop Date        Active               

 

                omeprazole 40 mg capsule,delayed release RxNorm: 005048  1 Capsu

le(s) PO QD 

10/07/2019          2019          Inactive             

 

             metoprolol tartrate 25 mg tablet RxNorm: 613779 1 Tablet(s) PO BID 

2019                Active                     

 

                omeprazole 40 mg capsule,delayed release RxNorm: 940785  1 Capsu

le(s) PO QD 

2019          10/06/2019          Inactive             

 

                    Flonase Allergy Relief 50 mcg/actuation nasal spray,suspensi

on RxNorm: 3316676     2

[place] [facility] QHS 2019      Inactive         

 

           simvastatin 40 mg tablet RxNorm: 924398 1 Tablet(s) PO QD 2019 

Inactive                                 

 

           simvastatin 40 mg tablet RxNorm: 556502 1 Tablet(s) PO QD 2019 

Inactive                                 

 

                omeprazole 40 mg capsule,delayed release RxNorm: 020660  1 Capsu

le(s) PO QD 

2019          Inactive             

 

           simvastatin 40 mg tablet RxNorm: 447063 1 Tablet(s) PO QD 2019 

Inactive                                 

 

                omeprazole 40 mg capsule,delayed release RxNorm: 207994  1 Capsu

le(s) PO QD 

2019          Inactive             

 

             Bactrim  mg-160 mg tablet RxNorm: 460328 1 Tablet(s) PO BID 0

3/08/2019   

2019                Inactive                   

 

             Bactrim  mg-160 mg tablet RxNorm: 490942 1 Tablet(s) PO BID 0

3/08/2019   

2019                Inactive                   

 

             Macrobid 100 mg capsule RxNorm: 053218 1 Capsule(s) PO BID 20

19   2019

                          Inactive                   

 

             metoprolol tartrate 25 mg tablet RxNorm: 111605 1 Tablet(s) PO BID 

2019                Inactive                   

 

                Xanax 0.25 mg tablet RxNorm: 536188  TAKE 1 TABLET BY MOUTH TWIC

E DAILY 

2018          10/28/2019          Inactive             

 

           Xanax 0.25 mg tablet RxNorm: 555923 1 Tablet(s) PO BID 2018 

Inactive                                 

 

                    Protonix 40 mg tablet,delayed release RxNorm: 014055      1 

Tablet(s) PO BID for 

stomach--replaces omeprazole 2018      Inactive         

 

             Carafate 1 gram tablet RxNorm: 803957 1 Tablet(s) PO AC & HS 2019                Inactive                   

 

           Xanax 0.25 mg tablet RxNorm: 429643 1 Tablet(s) PO BID 10/30/2018 12/

10/2018 

Inactive                                 

 

             Bactrim  mg-160 mg tablet RxNorm: 089685 1 Tablet(s) PO BID 1

   

10/08/2018                Inactive                   

 

             Bactrim  mg-160 mg tablet RxNorm: 520533 1 Tablet(s) PO BID 1

   

10/03/2018                Inactive                   

 

             Carafate 1 gram tablet RxNorm: 213310 1 Tablet(s) PO AC & HS 09/18/

2018   

10/17/2018                Inactive                   

 

                    Protonix 40 mg tablet,delayed release RxNorm: 201169      1 

Tablet(s) PO BID for 

stomach--replaces omeprazole 2018      Inactive         

 

                    Protonix 40 mg tablet,delayed release RxNorm: 606080      1 

Tablet(s) PO BID for 

stomach--replaces omeprazole 2018      Inactive         

 

                    fluticasone 50 mcg/actuation nasal spray,suspension RxNorm: 

2903673     2 Spray 

NASAL QD to each nostril 2018      Inactive         

 

             Nasonex 50 mcg/actuation Spray RxNorm: 2618210 2 [place] [facility] 2018                Inactive                   

 

                omeprazole 40 mg capsule,delayed release RxNorm: 064224  1 Capsu

le(s) PO QD 

2018          08/15/2018          Inactive             

 

                    simvastatin 40 mg tablet RxNorm: 802212      1 Tablet(s) PO 

QD TAKE 1 TABLET EVERY 

DAY             2018      Inactive         

 

             metoprolol tartrate 25 mg tablet RxNorm: 160253 1/2 Tablet(s) PO QD

 2018                Inactive                   

 

                simvastatin 40 mg tablet RxNorm: 059919  Tablet(s) TAKE 1 TABLET

 EVERY DAY 

2017          Inactive             

 

             metoprolol tartrate 25 mg tablet RxNorm: 369832 1/2 Tablet(s) PO QD

 2017                Inactive                   

 

                    Flonase 50 mcg/actuation nasal spray,suspension RxNorm: 1797

933     2 [place] [facility] 

BID             2017      Inactive         

 

                omeprazole 40 mg capsule,delayed release RxNorm: 046731  1 Capsu

le(s) PO QD 

2017          Inactive             

 

             metoprolol tartrate 25 mg tablet RxNorm: 428402 1/2 Tablet(s) PO QD

 04/10/2017   

2017                Inactive                   

 

                    ciprofloxacin 0.2 % ear drops in a dropperette RxNorm: 92602

6      4 Drop(s) OTIC TID

for 1 week      2016      Inactive         

 

           cefdinir 300 mg capsule RxNorm: 287244 2 Capsule(s) PO QD 2016 

Inactive                                 

 

                    omeprazole 40 mg capsule,delayed release RxNorm: 324581     

 TAKE 1 CAPSULE EVERY DAY

                2016      Inactive         

 

             simvastatin 40 mg tablet RxNorm: 090168 TAKE 1 TABLET EVERY DAY 2017                Inactive                   

 

                    Flonase 50 mcg/actuation nasal spray,suspension RxNorm: 1797

933     2 [place] [facility] 

BID             2015      Inactive         

 

             cefuroxime axetil 250 mg tablet RxNorm: 986505 1 Tablet(s) PO BID 0

2015                Inactive                   

 

             cefuroxime axetil 250 mg tablet RxNorm: 415781 1 Tablet(s) PO BID 0

2015                Inactive                   

 

                omeprazole 40 mg capsule,delayed release RxNorm: 029470  1 Capsu

le(s) PO QD 

2015          Inactive             

 

           meloxicam 7.5 mg tablet RxNorm: 567422 1 Tablet(s) PO QD 2015 0

2015 

Inactive                                 

 

                loratadine 10 mg tablet RxNorm: 974171  1 Tablet(s) PO QAM for a

llergies 

2014          Inactive             

 

                    Flonase 50 mcg/actuation nasal spray,suspension RxNorm: 8963

23      2 [place] [facility] BID

                2014      12/15/2015      Inactive         

 

           prednisone 20 mg tablet RxNorm: 084753 2 Tablet(s) PO BID 2014 

Inactive                                 

 

             Dyazide 37.5 mg-25 mg capsule RxNorm: 873059 1 Capsule(s) PO QAM 2014                Inactive                   

 

           Ceftin 500 mg tablet RxNorm: 539841 1 Tablet(s) PO BID 2014 

Inactive                                 

 

           Ceftin 500 mg tablet RxNorm: 979101 1 Tablet(s) PO BID 2014 

Inactive                                 

 

                    simvastatin 40 mg tablet RxNorm: 221895      Tablet(s) PO TA

KE ONE TABLET BY MOUTH 

EVERY DAY       2014      Inactive         

 

                    metoprolol tartrate 25 mg tablet RxNorm: 069065      Tablet(

s) PO TAKE ONE-HALF 

TABLET BY MOUTH EVERY DAY 2014      Inactive         

 

           Ceftin 500 mg tablet RxNorm: 344781 1 Tablet(s) PO BID 10/15/2013 10/

 

Inactive                                 

 

                loratadine 10 mg tablet RxNorm: 125934  1 Tablet(s) PO QAM for a

llergies 

2013          Inactive             

 

                    omeprazole 20 mg capsule,delayed release RxNorm: 308655     

 Capsule(s) PO TAKE ONE 

CAPSULE BY MOUTH TWICE DAILY 2013      Inactive         

 

                omeprazole 20 mg capsule,delayed release RxNorm: 877768  1 Capsu

le(s) PO BID 

2013          Inactive             

 

             Augmentin 875 mg-125 mg tablet RxNorm: 575999 1 Tablet(s) PO Q12H 0

5/10/2013   

2013                Inactive                   

 

             Floxin Otic Drops 1 bottle Drops RxNorm:      5 Drop(s) OTIC BID 05

/10/2013   

2013                Inactive                   

 

                    metoprolol tartrate 25 mg tablet RxNorm: 885224      Tablet(

s) PO TAKE ONE-HALF 

TABLET BY MOUTH EVERY DAY 2013      Inactive         

 

                    simvastatin 40 mg tablet RxNorm: 988199      Tablet(s) PO TA

KE ONE TABLET BY MOUTH 

EVERY DAY       2013      Inactive         

 

                lancets         RxNorm:         Misc Miscellaneous USE ONE TO CH

YOGI GLUCOSE EVERY DAY 

2013          Inactive             

 

             Carafate 1 gram tablet RxNorm: 084051 1 Tablet(s) PO AC & HS 2015                Inactive                   

 

           Flagyl 500 mg tablet RxNorm: 698950 1 Tablet(s) PO TID 10/10/2012 10/

 

Inactive                                 

 

             Carafate 1 gram tablet RxNorm: 449645 1 Tablet(s) PO AC & HS 10/10/

2012   

2012                Inactive                   

 

          One Touch Test strips RxNorm:   Miscellaneous QD 2012

 Inactive  

one touch ultra mini test strips

 

           Protonix 40 mg Tab RxNorm: 257185 1 Tablet(s) PO QD 2012 

Inactive                                 

 

           Protonix 40 mg Tab RxNorm: 189150 1 Tablet(s) PO QD 2012 10/09/

2012 

Inactive                                 

 

           prednisone 20 mg Tab RxNorm: 420465 1 Tablet(s) PO BID 2012 

Inactive                                 

 

             Flexeril 5 mg Tab RxNorm: 218390 1 Tablet(s) PO QHS for spasm 2012                Inactive                   

 

             Flexeril 5 mg Tab RxNorm: 951577 1 Tablet(s) PO QHS for spasm 2012                Inactive                   

 

                amlodipine 2.5 mg Tab RxNorm: 341506  1/2 Tablet(s) PO QD replac

es 5mg dose 

2012          Inactive             

 

           simvastatin 40 mg tablet RxNorm: 048677 1 Tablet(s) PO QD 2012 

Inactive                                 

 

             metoprolol tartrate 25 mg tablet RxNorm: 887347 1/2 Tablet(s) PO QD

 2012                Inactive                   

 

                omeprazole 20 mg capsule,delayed release RxNorm: 886116  1 Capsu

le(s) PO BID 

2012          Inactive             

 

           cefdinir 300 mg Cap RxNorm: 265324 2 Capsule(s) PO QD 2011 

Inactive                                 

 

           simvastatin 40 mg Tab RxNorm: 964133 1 Tablet(s) PO QD 2011 

Inactive                                 

 

             metoprolol tartrate 25 mg Tab RxNorm: 396208 1/2 Tablet(s) PO QD 10

/   

2012                Inactive                   

 

             metoprolol tartrate 25 mg Tab RxNorm: 655505 1/2 Tablet(s) PO QD 10

/   

10/24/2011                Inactive                   

 

           meclizine 25 mg Tab RxNorm: 1619197 1 Tablet(s) PO QHS 2011 

Inactive                                for dizziness

 

           Ceftin 500 mg Tab RxNorm: 610892 1 Tablet(s) PO BID 2011 

Inactive                                 

 

                omeprazole 20 mg Cap, Delayed Release RxNorm: 436106  1 Capsule(

s) PO BID 

2011          10/24/2011          Inactive             

 

           simvastatin 40 mg Tab RxNorm: 294052 1 Tablet(s) PO QD 2011 

Inactive                                 

 

           simvastatin 40 mg Tab RxNorm: 174637 1 Tablet(s) PO QD 2011 

Inactive                                 

 

           simvastatin 40 mg Tab RxNorm: 571830 1 Tablet(s) PO QD 2010 

Inactive                                 

 

             Tessalon Perles 100 mg Cap RxNorm: 303834 1 Capsule(s) PO Q6-8H 2010                Inactive                   

 

           cefdinir 300 mg Cap RxNorm: 683131 1 Capsule(s) PO BID 2010 

Inactive                                 

 

           Lexapro 10 mg Tab RxNorm: 665048 1 Tablet(s) PO QD 2010

010 

Inactive                                 

 

           Cipro 250 mg Tab RxNorm: 564910 1 Tablet(s) PO BID 2010 10/04/2

010 

Inactive                                 

 

             Wellbutrin  mg 24 hr Tab RxNorm: 827095 1 Tablet(s) PO QAM 2010                Inactive                   

 

          Fish Oil 1,000 mg Cap RxNorm:   1 Capsule(s) PO QD No Start Date      

     Active     

 

                Tylenol Extra Strength 500 mg tablet RxNorm: 589325  1/2 Tablet(

s) PO as needed 

No Start Date                           Active               

 

                nitroglycerin 0.4 mg sublingual tablet RxNorm: 609388  Tablet(s)

 SL as needed No 

Start Date                              Active               

 

                    Calcium with Vitamin D 600 mg (1,500 mg)-400 unit tablet RxN

orm: 802976      1 

Tablet(s) PO QD No Start Date                   Active           

 

           Multivitamin & Mineral Formula Tab RxNorm:    1 Tablet(s) PO QD No St

art Date            

Active                                   

 

          vitamin B complex capsule RxNorm:   1 Capsule(s) PO QD No Start Date  

         Active     

 

          Aspirin 81 mg Tab RxNorm: 214037 1 Tablet(s) PO QD No Start Date      

     Active     

 

                    isosorbide mononitrate ER 30 mg tablet,extended release 24 h

r RxNorm: 760855      1 

Tablet(s) PO QHS No Start Date                   Active           

 

           Vitamin D 1,000 unit Cap RxNorm: 498189 1 Capsule(s) PO QD No Start D

ate            

Active                                   

 

          Co Q-10 oral RxNorm: 54624 oral      No Start Date           Active   

  

 

             metoprolol tartrate 25 mg Tab RxNorm: 402708 1/2 Tablet(s) PO QD No

 Start Date 

10/23/2011                Inactive                   

 

             Nasonex 50 mcg/actuation Spray RxNorm: 3772612 2 Spray NASAL No Sta

rt Date 

2018                Inactive                   

 

           Vitamin B12 1000mcg Tablet RxNorm:    1 Tablet(s) PO QD No Start Date

 2015 

Inactive                                 

 

           amlodipine 5 mg Tab RxNorm: 474734 1 Tablet(s) PO QD No Start Date  

Inactive                                 

 

             simvastatin 40 mg tablet RxNorm: 605013 1 Tablet(s) PO QD No Start 

Date 

2019                Inactive                   

 

                omeprazole 20 mg capsule,delayed release RxNorm: 708808  1 Capsu

le(s) PO BID No 

Start Date          2013          Inactive             

 

                omeprazole 40 mg capsule,delayed release RxNorm: 250728  1 Capsu

le(s) PO QD No 

Start Date          2019          Inactive             

 

           Nexium 40 mg Cap RxNorm: 879694 1 Capsule(s) PO QD No Start Date  

Inactive                                 

 

             Stool Softener 100 mg Tab RxNorm: 1725992 2 Tablet(s) PO BID No Sta

rt Date 

2012                Inactive                   

 

                    Calcium with Vitamin D 600 mg (1,500 mg)-400 unit Tab RxNorm

: 296286      1 Tablet(s)

PO QD           No Start Date   2015      Inactive         

 

             iron 325 mg (65 mg iron) Tab RxNorm: 306217 1 Tablet(s) PO QD No St

art Date 

2015                Inactive                   

 

                Flonase 50 mcg/actuation Nasal Spray RxNorm: 995716  2 [place] ROZ

AL BID No Start 

Date                2014          Inactive             

 

                    fluticasone 50 mcg/actuation nasal spray,suspension RxNorm: 

9942180     2 Spray 

NASAL QD to each nostril No Start Date   2018      Inactive         

 

             Nasonex 50 mcg/actuation Spray RxNorm: 1439192 2 Spray NASAL QD No 

Start Date 

2018                Inactive                   

 

                    hydralazine 50 mg tablet RxNorm: 130582      1 Tablet(s) PO 

as needed for BP over 

160/90          No Start Date   2018      Inactive         

 

             Vimovo 500 mg-20 mg 12 hr Tab RxNorm: 498030 1 Tablet(s) PO BID No 

Start Date 

2011                Inactive                   

 

             Tylenol PM 25 mg-500 mg/15 mL Oral Soln RxNorm: 6130940 1 PO QPM   

  No Start Date 

2015                Inactive                   

 

          Iron (Ferrous Sulfate) Oral RxNorm:   Oral      No Start Date 20

12 Inactive   

 

             Reglan 10 mg Tab RxNorm: 924111 1 Tablet(s) PO TID before meals No 

Start Date 

2011                Inactive                   

 

           Xanax 1 mg Tab RxNorm: 862115 1/2 Tablet(s) PO QD No Start Date  

Inactive                                 

 

             amlodipine 10 mg tablet RxNorm: 836303 1 Tablet(s) PO QD No Start D

ate 

2014                Inactive                   

 

          Iron (dried) Oral RxNorm:   Oral      No Start Date 2012 Inactiv

e   

 

                metoprolol tartrate 50 mg tablet RxNorm: 853281  1 Tablet(s) PO 

BID No Start Date

                    12/10/2018          Inactive             

 

           Xanax 0.25 mg tablet RxNorm: 747830 1 Tablet(s) PO BID No Start Date 

10/29/2018 

Inactive                                 

 

                lancets         RxNorm:         Miscellaneous check blood sugar 

at least once daily No Start 

Date                2013          Inactive             

 

           simvastatin 40 mg Tab RxNorm: 694530 1 Tablet(s) PO QD No Start Date 

2010 

Inactive                                 

 

                    isosorbide mononitrate ER 30 mg tablet,extended release 24 h

r RxNorm: 801823      1 

Tablet(s) PO QHS No Start Date   2019      Inactive         

 

             amlodipine 2.5 mg tablet RxNorm: 586512 1 Tablet(s) PO QD No Start 

Date 

2014                Inactive                   

 

             sucralfate 1 gram tablet RxNorm: 870900 1 Tablet(s) PO QID No Start

 Date 

2019                Inactive                   

 

          Fish Oil Oral RxNorm:   Oral      No Start Date 2012 Inactive   

 

          Multiple Vitamin Oral RxNorm:   Oral      No Start Date 2012 [facility]

ctive   

 

                metoprolol tartrate 25 mg tablet RxNorm: 390732  1/2 Tablet(s) P

O QD No Start 

Date                2017          Inactive             

 

                metoprolol tartrate 50 mg tablet RxNorm: 920659  1/2 Tablet(s) P

O BID No Start 

Date                2019          Inactive             

 

                    Flonase Allergy Relief 50 mcg/actuation nasal spray,suspensi

on RxNorm: 6733721     2

[place] NASAL QHS No Start Date   2019      Inactive         







Medication Administered

No Medication Administered data



Immunizations





                Vaccine         Codes           Date            Status

 

                Influenza       CVX: 135        10/22/2019      Complete

 

                Influenza       CVX: 135        10/22/2019      Complete

 

                Influenza       CVX: 135        2018      Complete

 

                Influenza       CVX: 135        10/06/2017      Complete

 

                Influenza       CVX: 135        10/07/2016      Complete

 

                Influenza       CVX: 135        10/16/2015       

 

                Influenza       CVX: 141        2013       

 

                Influenza (Adult) CVX: 141        10/06/2010       







Results





          Observation Observation Code Item      Item Code Result    Date      S

ervice Location

 

          GFR CALC  9541529   GFR Non Afr Amr           52 mL/min 10/11/2019 Unk

nown

 

          GFR CALC  3221925   GFR Afr Amr           >60 mL/min 10/11/2019 Unknow

n

 

          COMPREHENSIVE METABOLIC 30795     AST                 17 U/L    10/11/

2019 Unknown

 

          COMPREHENSIVE METABOLIC 81803     ALT                 11 U/L    10/11/

2019 Unknown

 

          COMPREHENSIVE METABOLIC 86264     BUN                 17 mg/dL  10/11/

2019 Unknown

 

          COMPREHENSIVE METABOLIC 17548     ALBUMIN             3.9 g/dL  10/11/

2019 Unknown

 

          COMPREHENSIVE METABOLIC 04693     CHLORIDE            108 mmol/L 10/11

/2019 Unknown

 

          COMPREHENSIVE METABOLIC 13238     Bili Total           0.5 mg/dL 10/11

/2019 Unknown

 

          COMPREHENSIVE METABOLIC 21822     ALK PHOS            72 U/L    10/11/

2019 Unknown

 

          COMPREHENSIVE METABOLIC 11501     SODIUM              142 mmol/L 10/11

/2019 Unknown

 

          COMPREHENSIVE METABOLIC 63288     CREATININE           1.02 mg/dL 10/1

2019 Unknown

 

          COMPREHENSIVE METABOLIC 57710     CALCIUM             9.3 mg/dL 10/11/

2019 Unknown

 

          COMPREHENSIVE METABOLIC 75447     POTASSIUM           4.0 mmol/L 10/11

/2019 Unknown

 

          COMPREHENSIVE METABOLIC 75334     Total Protein           6.2 g/dL  10

/2019 Unknown

 

          COMPREHENSIVE METABOLIC 71730     Glucose             93 mg/dL  10/11/

2019 Unknown

 

          COMPREHENSIVE METABOLIC 73047     Bicarbonate           27 mmol/L 10/1

2019 Unknown

 

          COMPREHENSIVE METABOLIC 71912     AGAP                7 mmol/L  10/11/

2019 Unknown

 

          GFR CALC  5060025   GFR Non Afr Amr           48 mL/min 06/10/2019 Unk

nown

 

          GFR CALC  1757960   GFR Afr Amr           59 mL/min 06/10/2019 Unknown

 

          THYROID STIMULATING HORMONE 03493     TSH                 1.936 uIU/mL

 06/10/2019 Unknown

 

          COMPREHENSIVE METABOLIC 19794     AST                 18 U/L    06/10/

2019 Unknown

 

          COMPREHENSIVE METABOLIC 23515     ALT                 11 U/L    06/10/

2019 Unknown

 

          COMPREHENSIVE METABOLIC 24694     BUN                 18 mg/dL  06/10/

2019 Unknown

 

          COMPREHENSIVE METABOLIC 84729     ALBUMIN             4.2 g/dL  06/10/

2019 Unknown

 

          COMPREHENSIVE METABOLIC 27864     CHLORIDE            109 mmol/L 06/10

/2019 Unknown

 

          COMPREHENSIVE METABOLIC 09527     Bili Total           0.4 mg/dL 06/10

/2019 Unknown

 

          COMPREHENSIVE METABOLIC 48267     ALK PHOS            64 U/L    06/10/

2019 Unknown

 

          COMPREHENSIVE METABOLIC 05720     SODIUM              142 mmol/L 06/10

/2019 Unknown

 

          COMPREHENSIVE METABOLIC 97857     CREATININE           1.09 mg/dL  Unknown

 

          COMPREHENSIVE METABOLIC 05905     CALCIUM             9.3 mg/dL 06/10/

2019 Unknown

 

          COMPREHENSIVE METABOLIC 57747     POTASSIUM           4.7 mmol/L 06/10

/2019 Unknown

 

          COMPREHENSIVE METABOLIC 17753     Total Protein           6.3 g/dL  06

/10/2019 Unknown

 

          COMPREHENSIVE METABOLIC 68075     Glucose             84 mg/dL  06/10/

2019 Unknown

 

          COMPREHENSIVE METABOLIC 09533     Bicarbonate           25 mmol/L  Unknown

 

          COMPREHENSIVE METABOLIC 18700     AGAP                8 mmol/L  06/10/

2019 Unknown

 

          COMPLETE BLOOD COUNT [id number]   WBC                 7.8 10e9/L 06/10/20

19 Unknown

 

          COMPLETE BLOOD COUNT [id number]   RBC                 4.04 10e12/L 06/10/

2019 Unknown

 

          COMPLETE BLOOD COUNT [id number]   HEMOGLOBIN           12.2 g/dL 06/10/20

19 Unknown

 

          COMPLETE BLOOD COUNT [id number]   HEMATOCRIT           38.6 %    06/10/20

19 Unknown

 

          COMPLETE BLOOD COUNT [id number]   MCV                 95.5 fL   06/10/201

9 Unknown

 

          COMPLETE BLOOD COUNT [id number]   MCH                 30.2 pg   06/10/201

9 Unknown

 

          COMPLETE BLOOD COUNT [id number]   MCHC                31.6 g/dL 06/10/201

9 Unknown

 

          COMPLETE BLOOD COUNT [id number]   PLATELET COUNT           215 10e9/L 06/

10/2019 Unknown

 

          COMPLETE BLOOD COUNT [id number]   Mean Plt Volume           11.2 fL   06/

10/2019 Unknown

 

          COMPLETE BLOOD COUNT [id number]   Neut Auto           45.7 %    06/10/201

9 Unknown

 

          COMPLETE BLOOD COUNT [id number]   Lymph Auto           42.1 %    06/10/20

19 Unknown

 

          COMPLETE BLOOD COUNT [id number]   Mono Auto           9.2 %     06/10/201

9 Unknown

 

          COMPLETE BLOOD COUNT [id number]   RDW                 13.4 %    06/10/201

9 Unknown

 

          COMPLETE BLOOD COUNT [id number]   Eos Auto            2.7 %     06/10/201

9 Unknown

 

          COMPLETE BLOOD COUNT [id number]   Baso Auto           0.3 %     06/10/201

9 Unknown

 

          COMPLETE BLOOD COUNT [id number]   Neutrophil Abs           3.56 10e9/L 06

/10/2019 Unknown

 

          COMPLETE BLOOD COUNT [id number]   Lymphocyte Abs           3.28 10e9/L 06

/10/2019 Unknown

 

          COMPLETE BLOOD COUNT [id number]   Monocyte Abs           0.72 10e9/L  Unknown

 

          COMPLETE BLOOD COUNT [id number]   Eosinophil Abs           0.21 10e9/L 06

/10/2019 Unknown

 

          COMPLETE BLOOD COUNT [id number]   RDW-SD              44.9 fL   06/10/201

9 Unknown

 

          COMPLETE BLOOD COUNT [id number]   Basophil Abs           0.02 10e9/L  Unknown

 

          COMPLETE BLOOD COUNT [id number]   WBC                 5.2 10e9/L 20

18 Unknown

 

          COMPLETE BLOOD COUNT [id number]   RBC                 4.21 10e12/L 2018 Unknown

 

          COMPLETE BLOOD COUNT [id number]   HEMOGLOBIN           12.8 g/dL 20

18 Unknown

 

          COMPLETE BLOOD COUNT [id number]   HEMATOCRIT           39.0 %    20

18 Unknown

 

          COMPLETE BLOOD COUNT [id number]   MCV                 92.6 fL   

8 Unknown

 

          COMPLETE BLOOD COUNT [id number]   MCH                 30.4 pg   

8 Unknown

 

          COMPLETE BLOOD COUNT [id number]   MCHC                32.8 g/dL 

8 Unknown

 

          COMPLETE BLOOD COUNT [id number]   PLATELET COUNT           202 10e9/L  Unknown

 

          COMPLETE BLOOD COUNT [id number]   Mean Plt Volume           10.7 fL    Unknown

 

          COMPLETE BLOOD COUNT [id number]   Neut Auto           40.9 %    

8 Unknown

 

          COMPLETE BLOOD COUNT [id number]   Lymph Auto           46.3 %    20

18 Unknown

 

          COMPLETE BLOOD COUNT [id number]   Mono Auto           9.3 %     

8 Unknown

 

          COMPLETE BLOOD COUNT [id number]   RDW                 13.7 %    

8 Unknown

 

          COMPLETE BLOOD COUNT [id number]   Eos Auto            2.9 %     

8 Unknown

 

          COMPLETE BLOOD COUNT [id number]   Baso Auto           0.6 %     

8 Unknown

 

          COMPLETE BLOOD COUNT [id number]   Neutrophil Abs           2.13 10e9/L  Unknown

 

          COMPLETE BLOOD COUNT [id number]   Lymphocyte Abs           2.41 10e9/L  Unknown

 

          COMPLETE BLOOD COUNT [id number]   Monocyte Abs           0.48 10e9/L  Unknown

 

          COMPLETE BLOOD COUNT [id number]   Eosinophil Abs           0.15 10e9/L  Unknown

 

          COMPLETE BLOOD COUNT [id number]   RDW-SD              45.2 fL   

8 Unknown

 

          COMPLETE BLOOD COUNT [id number]   Basophil Abs           0.03 10e9/L  Unknown

 

          [place] [address]     Glucose             118 mg/dL  Unknown

 

          [place] [address]     CREATININE           1.01 mg/dL  Unknown

 

          [place] [address]     BUN                 14 mg/dL   Unknown

 

          [place] [address]     SODIUM              141 mmol/L  Unknown

 

          [place] [address]     POTASSIUM           4.0 mmol/L  Unknown

 

          [place] [address]     CHLORIDE            108 mmol/L  Unknown

 

          [place] [address]     Bicarbonate           25 mmol/L  Unknown

 

          [place] [address]     AGAP                8 mmol/L   Unknown

 

          [place] [address]     CALCIUM             9.6 mg/dL  Unknown

 

          FREE T4   20779     T4 Free             1.23 ng/dL 2018 Unknown

 

          GFR CALC  2416549   GFR Non Afr Amr           53 mL/min 2018 Unk

nown

 

          GFR CALC  0914172   GFR Afr Amr           >60 mL/min 2018 Unknow

n

 

          THYROID STIMULATING HORMONE 86143     TSH                 2.124 uIU/mL

 2018 Unknown

 

          LIPID GROUP 03684     Cholesterol           152 mg/dL 2018 Unkno

wn

 

          LIPID GROUP 10783     Triglyceride           151 mg/dL 2018 Unkn

own

 

          LIPID GROUP 93395     HDL CHOLESTEROL           47 mg/dL  2018 U

nknown

 

          LIPID GROUP 31852     Chol/HDL Ratio           3.23 ratio 2018 U

nknown

 

          LIPID GROUP 11954     NON-HDL Chol           105 mg/dL 2018 Unkn

own

 

          LIPID GROUP 69506     LDL Cholesterol           75 mg/dL  2018 U

nknown

 

          ASSAY OF TROPONIN QUANT 33363     Troponin-I           <0.30 ng/mL  Unknown

 

          COMPREHENSIVE METABOLIC 98594     AST                 20 U/L    2018 Unknown

 

          COMPREHENSIVE METABOLIC 85527     ALT                 14 U/L    2018 Unknown

 

          COMPREHENSIVE METABOLIC 15194     BUN                 19 mg/dL  2018 Unknown

 

          COMPREHENSIVE METABOLIC 97880     ALBUMIN             4.2 g/dL  2018 Unknown

 

          COMPREHENSIVE METABOLIC 72910     CHLORIDE            102 mmol/L  Unknown

 

          COMPREHENSIVE METABOLIC 37629     Bili Total           0.4 mg/dL  Unknown

 

          COMPREHENSIVE METABOLIC 14539     ALK PHOS            66 U/L    2018 Unknown

 

          COMPREHENSIVE METABOLIC 39288     SODIUM              135 mmol/L  Unknown

 

          COMPREHENSIVE METABOLIC 19034     CREATININE           1.01 mg/dL  Unknown

 

          COMPREHENSIVE METABOLIC 12961     CALCIUM             9.3 mg/dL 2018 Unknown

 

          COMPREHENSIVE METABOLIC 89023     POTASSIUM           4.8 mmol/L  Unknown

 

          COMPREHENSIVE METABOLIC 68311     Total Protein           7.0 g/dL   Unknown

 

          COMPREHENSIVE METABOLIC 36465     Glucose             91 mg/dL  2018 Unknown

 

          COMPREHENSIVE METABOLIC 97753     Bicarbonate           23 mmol/L  Unknown

 

          COMPREHENSIVE METABOLIC 19510     AGAP                10 mmol/L 2018 Unknown

 

          COMPLETE BLOOD COUNT [id number]   WBC                 7.5 10e9/L 20

18 Unknown

 

          COMPLETE BLOOD COUNT [id number]   RBC                 4.13 10e12/L 2018 Unknown

 

          COMPLETE BLOOD COUNT [id number]   HEMOGLOBIN           12.6 g/dL 20

18 Unknown

 

          COMPLETE BLOOD COUNT [id number]   HEMATOCRIT           38.4 %    20

18 Unknown

 

          COMPLETE BLOOD COUNT [id number]   MCV                 93.0 fL   

8 Unknown

 

          COMPLETE BLOOD COUNT [id number]   MCH                 30.5 pg   

8 Unknown

 

          COMPLETE BLOOD COUNT 8077020   MCHC                32.8 g/dL 

8 Unknown

 

          COMPLETE BLOOD COUNT [id number]   PLATELET COUNT           204 10e9/L  Unknown

 

          COMPLETE BLOOD COUNT [id number]   Mean Plt Volume           10.9 fL    Unknown

 

          COMPLETE BLOOD COUNT 1286909   Neut Auto           43.1 %    

8 Unknown

 

          COMPLETE BLOOD COUNT [id number]   Lymph Auto           45.0 %    20

18 Unknown

 

          COMPLETE BLOOD COUNT [id number]   Mono Auto           9.2 %     

8 Unknown

 

          COMPLETE BLOOD COUNT [id number]   RDW                 13.4 %    

8 Unknown

 

          COMPLETE BLOOD COUNT 8266915   Eos Auto            2.3 %     

8 Unknown

 

          COMPLETE BLOOD COUNT [id number]   Baso Auto           0.4 %     

8 Unknown

 

          COMPLETE BLOOD COUNT [id number]   Neutrophil Abs           3.23 10e9/L  Unknown

 

          COMPLETE BLOOD COUNT [id number]   Lymphocyte Abs           3.38 10e9/L  Unknown

 

          COMPLETE BLOOD COUNT [id number]   Monocyte Abs           0.69 10e9/L  Unknown

 

          COMPLETE BLOOD COUNT [id number]   Eosinophil Abs           0.17 10e9/L  Unknown

 

          COMPLETE BLOOD COUNT [id number]   RDW-SD              44.4 fL   

8 Unknown

 

          COMPLETE BLOOD COUNT 7807984   Basophil Abs           0.03 10e9/L  Unknown

 

          GFR CALC  9706288   GFR Non Afr Amr           53 mL/min 2018 Unk

nown

 

          GFR CALC  3920040   GFR Afr Amr           >60 mL/min 2018 Unknow

n

 

          GLYCOSYLATED HEMOGLOBIN TEST 88035     Hgb A1c   84940-4   5.4 %     0

2018 Unknown

 

          MEAN GLUC 3612671   Calc Mean Gluc           108 mg/dL 2018 Unkn

own

 

          MEAN GLUC 4230119   Calc Mean Gluc           114 mg/dL 2017 Unkn

own

 

          LIPID GROUP 75477     Cholesterol           146 mg/dL 2017 Unkno

wn

 

          LIPID GROUP 88529     Triglyceride           119 mg/dL 2017 Unkn

own

 

          LIPID GROUP 34994     HDL CHOLESTEROL           47 mg/dL  2017 U

nknown

 

          LIPID GROUP 60070     Chol/HDL Ratio           3.11 ratio 2017 U

nknown

 

          LIPID GROUP 92582     NON-HDL Chol           99 mg/dL  2017 Unkn

own

 

          LIPID GROUP 77586     LDL Cholesterol           75 mg/dL  2017 U

nknown

 

          GLYCOSYLATED HEMOGLOBIN TEST 05139     Hgb A1c   57702-9   5.6 %     0

2017 Unknown

 

          COMPREHENSIVE METABOLIC 21681     AST                 22 U/L    2017 Unknown

 

          COMPREHENSIVE METABOLIC 67503     ALT                 12 U/L    2017 Unknown

 

          COMPREHENSIVE METABOLIC 97957     BUN                 17 mg/dL  2017 Unknown

 

          COMPREHENSIVE METABOLIC 00254     ALBUMIN             4.0 g/dL  2017 Unknown

 

          COMPREHENSIVE METABOLIC 78698     CHLORIDE            110 mmol/L  Unknown

 

          COMPREHENSIVE METABOLIC 96728     Bili Total           0.4 mg/dL  Unknown

 

          COMPREHENSIVE METABOLIC 29949     ALK PHOS            63 U/L    2017 Unknown

 

          COMPREHENSIVE METABOLIC 63217     SODIUM              140 mmol/L  Unknown

 

          COMPREHENSIVE METABOLIC 73389     CREATININE           1.05 mg/dL  Unknown

 

          COMPREHENSIVE METABOLIC 45402     CALCIUM             9.2 mg/dL 2017 Unknown

 

          COMPREHENSIVE METABOLIC 00946     POTASSIUM           4.2 mmol/L  Unknown

 

          COMPREHENSIVE METABOLIC 58308     Total Protein           6.2 g/dL   Unknown

 

          COMPREHENSIVE METABOLIC 97538     Glucose             87 mg/dL  2017 Unknown

 

          COMPREHENSIVE METABOLIC 21672     Bicarbonate           24 mmol/L  Unknown

 

          COMPREHENSIVE METABOLIC 99230     AGAP                6 mmol/L  2017 Unknown

 

          GFR CALC  3374561   GFR Non Afr Amr           51 mL/min 2017 Unk

nown

 

          GFR CALC  4893184   GFR Afr Amr           >60 mL/min 2017 Unknow

n

 

          COMPLETE BLOOD COUNT [id number]   WBC                 6.7 10e9/L 20

17 Unknown

 

          COMPLETE BLOOD COUNT [id number]   RBC                 4.04 10e12/L 2017 Unknown

 

          COMPLETE BLOOD COUNT [id number]   HEMOGLOBIN           12.1 g/dL 20

17 Unknown

 

          COMPLETE BLOOD COUNT [id number]   HEMATOCRIT           38.0 %    20

17 Unknown

 

          COMPLETE BLOOD COUNT [id number]   MCV                 94.1 fL   

7 Unknown

 

          COMPLETE BLOOD COUNT [id number]   MCH                 30.0 pg   

7 Unknown

 

          COMPLETE BLOOD COUNT [id number]   MCHC                31.8 g/dL 

7 Unknown

 

          COMPLETE BLOOD COUNT [id number]   PLATELET COUNT           206 10e9/L  Unknown

 

          COMPLETE BLOOD COUNT [id number]   Mean Plt Volume           11.3 fL    Unknown

 

          COMPLETE BLOOD COUNT [id number]   Neut Auto           35.8 %    

7 Unknown

 

          COMPLETE BLOOD COUNT [id number]   Lymph Auto           51.6 %    20

17 Unknown

 

          COMPLETE BLOOD COUNT [id number]   Mono Auto           8.8 %     

7 Unknown

 

          COMPLETE BLOOD COUNT [id number]   RDW                 13.5 %    

7 Unknown

 

          COMPLETE BLOOD COUNT [id number]   Eos Auto            3.4 %     

7 Unknown

 

          COMPLETE BLOOD COUNT [id number]   Baso Auto           0.4 %     

7 Unknown

 

          COMPLETE BLOOD COUNT [id number]   Neutrophil Abs           2.40 10e9/L  Unknown

 

          COMPLETE BLOOD COUNT [id number]   Lymphocyte Abs           3.46 10e9/L  Unknown

 

          COMPLETE BLOOD COUNT [id number]   Monocyte Abs           0.59 10e9/L  Unknown

 

          COMPLETE BLOOD COUNT [id number]   Eosinophil Abs           0.23 10e9/L  Unknown

 

          COMPLETE BLOOD COUNT [id number]   RDW-SD              45.3 fL   

7 Unknown

 

          COMPLETE BLOOD COUNT [id number]   Basophil Abs           0.03 10e9/L  Unknown

 

          THYROID STIMULATING HORMONE 23617     TSH                 1.981 uIU/mL

 2017 Unknown

 

          COMPLETE BLOOD COUNT [id number]   WBC                 6.0 10e9/L 20

17 Unknown

 

          COMPLETE BLOOD COUNT [id number]   RBC                 4.29 10e12/L 2017 Unknown

 

          COMPLETE BLOOD COUNT [id number]   HEMOGLOBIN           12.9 g/dL 20

17 Unknown

 

          COMPLETE BLOOD COUNT [id number]   HEMATOCRIT           38.4 %    20

17 Unknown

 

          COMPLETE BLOOD COUNT [id number]   MCV                 89.5 fL   

7 Unknown

 

          COMPLETE BLOOD COUNT [id number]   MCH                 30.1 pg   

7 Unknown

 

          COMPLETE BLOOD COUNT [id number]   MCHC                33.6 g/dL 

7 Unknown

 

          COMPLETE BLOOD COUNT [id number]   PLATELET COUNT           181 10e9/L 2017 Unknown

 

          COMPLETE BLOOD COUNT [id number]   Mean Plt Volume           11.7 fL   2017 Unknown

 

          COMPLETE BLOOD COUNT [id number]   Neut Auto           36.9 %    

7 Unknown

 

          COMPLETE BLOOD COUNT [id number]   Lymph Auto           50.4 %    20

17 Unknown

 

          COMPLETE BLOOD COUNT [id number]   Mono Auto           9.0 %     

7 Unknown

 

          COMPLETE BLOOD COUNT [id number]   RDW                 13.7 %    

7 Unknown

 

          COMPLETE BLOOD COUNT [id number]   Eos Auto            3.4 %     

7 Unknown

 

          COMPLETE BLOOD COUNT [id number]   Baso Auto           0.3 %     

7 Unknown

 

          COMPLETE BLOOD COUNT [id number]   Neutrophil Abs           2.21 10e9/L  Unknown

 

          COMPLETE BLOOD COUNT [id number]   Lymphocyte Abs           3.02 10e9/L  Unknown

 

          COMPLETE BLOOD COUNT [id number]   Monocyte Abs           0.54 10e9/L 2017 Unknown

 

          COMPLETE BLOOD COUNT [id number]   Eosinophil Abs           0.20 10e9/L  Unknown

 

          COMPLETE BLOOD COUNT [id number]   RDW-SD              44.0 fL   

7 Unknown

 

          COMPLETE BLOOD COUNT [id number]   Basophil Abs           0.02 10e9/L 2017 Unknown

 

          GLYCOSYLATED HEMOGLOBIN TEST 97128     Hgb A1c   45149-8   5.4 %     0

3/09/2017 Unknown

 

          THYROID STIMULATING HORMONE 66818     TSH                 2.200 uIU/mL

 2017 Unknown

 

          GFR CALC  7863855   GFR Non Afr Amr           50 mL/min 2017 Unk

nown

 

          GFR CALC  8558336   GFR Afr Amr           >60 mL/min 2017 Unknow

n

 

          MEAN GLUC 8496681   Calc Mean Gluc           108 mg/dL 2017 Unkn

own

 

          COMPREHENSIVE METABOLIC 40022     AST                 18 U/L    2017 Unknown

 

          COMPREHENSIVE METABOLIC 72022     ALT                 10 U/L    2017 Unknown

 

          COMPREHENSIVE METABOLIC 25237     BUN                 20 mg/dL  2017 Unknown

 

          COMPREHENSIVE METABOLIC 72138     ALBUMIN             4.1 g/dL  2017 Unknown

 

          COMPREHENSIVE METABOLIC 85224     CHLORIDE            109 mmol/L  Unknown

 

          COMPREHENSIVE METABOLIC 85667     Bili Total           0.6 mg/dL  Unknown

 

          COMPREHENSIVE METABOLIC 84631     ALK PHOS            64 U/L    2017 Unknown

 

          COMPREHENSIVE METABOLIC 19688     SODIUM              141 mmol/L  Unknown

 

          COMPREHENSIVE METABOLIC 87717     CREATININE           1.06 mg/dL 2017 Unknown

 

          COMPREHENSIVE METABOLIC 56942     CALCIUM             9.9 mg/dL 2017 Unknown

 

          COMPREHENSIVE METABOLIC 72757     POTASSIUM           4.2 mmol/L  Unknown

 

          COMPREHENSIVE METABOLIC 06982     Total Protein           6.3 g/dL   Unknown

 

          COMPREHENSIVE METABOLIC 99766     Glucose             99 mg/dL  2017 Unknown

 

          COMPREHENSIVE METABOLIC 28663     Bicarbonate           21 mmol/L 2017 Unknown

 

          COMPREHENSIVE METABOLIC 15858     AGAP                11 mmol/L 2017 Unknown

 

          LIPID GROUP [id number]     Cholesterol           169 mg/dL 2016 Unkno

wn

 

          LIPID GROUP [id number]     Triglyceride           165 mg/dL 2016 Unkn

own

 

          LIPID GROUP [id number]     HDL CHOLESTEROL           43 mg/dL  2016 U

nknown

 

          LIPID GROUP [id number]     Chol/HDL Ratio           3.93 ratio 2016 U

nknown

 

          LIPID GROUP [id number]     NON-HDL Chol           126 mg/dL 2016 Unkn

own

 

          LIPID GROUP 67085     LDL Cholesterol           93 mg/dL  2016 U

nknown

 

          COMPREHENSIVE METABOLIC 35276     AST                 18 U/L    2016 Unknown

 

          COMPREHENSIVE METABOLIC 53403     ALT                 10 U/L    2016 Unknown

 

          COMPREHENSIVE METABOLIC 19560     BUN                 20 mg/dL  2016 Unknown

 

          COMPREHENSIVE METABOLIC 41152     ALBUMIN             3.9 g/dL  2016 Unknown

 

          COMPREHENSIVE METABOLIC 94805     CHLORIDE            110 mmol/L  Unknown

 

          COMPREHENSIVE METABOLIC 80337     Bili Total           0.5 mg/dL  Unknown

 

          COMPREHENSIVE METABOLIC 60145     ALK PHOS            72 U/L    2016 Unknown

 

          COMPREHENSIVE METABOLIC 94664     SODIUM              141 mmol/L  Unknown

 

          COMPREHENSIVE METABOLIC 46731     CREATININE           1.12 mg/dL  Unknown

 

          COMPREHENSIVE METABOLIC 53889     CALCIUM             9.7 mg/dL 2016 Unknown

 

          COMPREHENSIVE METABOLIC 46347     POTASSIUM           4.4 mmol/L  Unknown

 

          COMPREHENSIVE METABOLIC 04056     Total Protein           6.2 g/dL   Unknown

 

          COMPREHENSIVE METABOLIC 25283     Glucose             90 mg/dL  2016 Unknown

 

          COMPREHENSIVE METABOLIC 49932     Bicarbonate           23 mmol/L  Unknown

 

          COMPREHENSIVE METABOLIC 86704     AGAP                8 mmol/L  2016 Unknown

 

          GFR CALC  8451096   GFR Non Afr Amr           47 mL/min 2016 Unk

nown

 

          GFR CALC  1847453   GFR Afr Amr           57 mL/min 2016 Unknown

 

          GLYCOSYLATED HEMOGLOBIN TEST 04796     Hgb A1c   74697-7   5.5 %     0

2016 Unknown

 

          THYROID STIMULATING HORMONE 33242     TSH                 2.537 uIU/mL

 2016 Unknown

 

          FREE T4   05896     T4 Free             1.36 ng/dL 2016 Unknown

 

          COMPLETE BLOOD COUNT [id number]   WBC                 6.8 10e9/L 20

16 Unknown

 

          COMPLETE BLOOD COUNT [id number]   RBC                 4.20 10e12/L 2016 Unknown

 

          COMPLETE BLOOD COUNT [id number]   HEMOGLOBIN           12.5 g/dL 20

16 Unknown

 

          COMPLETE BLOOD COUNT [id number]   HEMATOCRIT           38.0 %    20

16 Unknown

 

          COMPLETE BLOOD COUNT [id number]   MCV                 90.5 fL   

6 Unknown

 

          COMPLETE BLOOD COUNT 0110206   MCH                 29.8 pg   

6 Unknown

 

          COMPLETE BLOOD COUNT 4508203   MCHC                32.9 g/dL 

6 Unknown

 

          COMPLETE BLOOD COUNT 5782001   PLATELET COUNT           197 10e9/L  Unknown

 

          COMPLETE BLOOD COUNT 7259692   Mean Plt Volume           11.7 fL    Unknown

 

          COMPLETE BLOOD COUNT 5059374   Neut Auto           41.3 %    

6 Unknown

 

          COMPLETE BLOOD COUNT 8068917   Lymph Auto           47.1 %    20

16 Unknown

 

          COMPLETE BLOOD COUNT [id number]   Mono Auto           7.8 %     

6 Unknown

 

          COMPLETE BLOOD COUNT 8257325   RDW                 13.8 %    

6 Unknown

 

          COMPLETE BLOOD COUNT [id number]   Eos Auto            3.4 %     

6 Unknown

 

          COMPLETE BLOOD COUNT [id number]   Baso Auto           0.4 %     

6 Unknown

 

          COMPLETE BLOOD COUNT [id number]   Neutrophil Abs           2.81 10e9/L  Unknown

 

          COMPLETE BLOOD COUNT 2104531   Lymphocyte Abs           3.20 10e9/L  Unknown

 

          COMPLETE BLOOD COUNT 5781094   Monocyte Abs           0.53 10e9/L  Unknown

 

          COMPLETE BLOOD COUNT [id number]   Eosinophil Abs           0.23 10e9/L  Unknown

 

          COMPLETE BLOOD COUNT [id number]   RDW-SD              44.4 fL   

6 Unknown

 

          COMPLETE BLOOD COUNT [id number]   Basophil Abs           0.03 10e9/L  Unknown

 

          MEAN GLUC 7590991   Calc Mean Gluc           111 mg/dL 2016 Unkn

own

 

          [place] [address]     Glucose             89 MG/DL   Unknown

 

          [place] [address]     CREATININE           1.12 MG/DL  Unknown

 

          [place] [address]     BUN                 20 MG/DL   Unknown

 

          [place] [address]     SODIUM              139 MMOL/L  Unknown

 

          [place] [address]     POTASSIUM           4.6 MMOL/L  Unknown

 

          [place] [address]     CHLORIDE            108 MMOL/L  Unknown

 

          [place] [address]     BICARB              26 MMOL/L  Unknown

 

          [place] [address]     ANION GAP           5 MEQ/L    Unknown

 

          [place] [address]     CALCIUM             10.0 MG/DL  Unknown

 

          GFR CALC  4383810   GFR AA              57.0L ML/MIN 2015 Unknow

n

 

          GFR CALC  1972802   GFR NON-AA           47.0L ML/MIN 2015 Unkno

wn

 

          THYROID STIMULATING HORMONE 16235     TSH                 2.378 uIU/ML

 09/10/2015 Unknown

 

          COMPLETE BLOOD COUNT [id number]   WBC                 6.4 10e9/L 09/10/20

15 Unknown

 

          COMPLETE BLOOD COUNT [id number]   RBC                 3.99 10e12/L 09/10/

2015 Unknown

 

          COMPLETE BLOOD COUNT [id number]   HGB                 11.9 g/dL 09/10/201

5 Unknown

 

          COMPLETE BLOOD COUNT [id number]   HCT DET             36.9 %    09/10/201

5 Unknown

 

          COMPLETE BLOOD COUNT [id number]   MCV                 92.5 fL   09/10/201

5 Unknown

 

          COMPLETE BLOOD COUNT [id number]   MCH                 29.8 pg   09/10/201

5 Unknown

 

          COMPLETE BLOOD COUNT [id number]   MCHC                32.2 g/dL 09/10/201

5 Unknown

 

          COMPLETE BLOOD COUNT [id number]   PLT                 172 10e9/L 09/10/20

15 Unknown

 

          COMPLETE BLOOD COUNT [id number]   MPV                 11.7 fL   09/10/201

5 Unknown

 

          COMPLETE BLOOD COUNT [id number]   ARIK %               40.4 %    09/10/201

5 Unknown

 

          COMPLETE BLOOD COUNT [id number]   LY %                48.0 %    09/10/201

5 Unknown

 

          COMPLETE BLOOD COUNT [id number]   MON %               8.3 %     09/10/201

5 Unknown

 

          COMPLETE BLOOD COUNT 4831804   EOS  %              2.8 %     09/10/201

5 Unknown

 

          COMPLETE BLOOD COUNT [id number]   BASO %              0.5 %     09/10/201

5 Unknown

 

          COMPLETE BLOOD COUNT [id number]   RDW                 13.6 %    09/10/201

5 Unknown

 

          COMPLETE BLOOD COUNT [id number]   ABS ARIK             2.59 10e9/L 09/10/2

015 Unknown

 

          COMPLETE BLOOD COUNT [id number]   ABS LYMPH           3.07 10e9/L 09/10/2

015 Unknown

 

          COMPLETE BLOOD COUNT [id number]   ABS MONO            0.53 10e9/L 09/10/2

015 Unknown

 

          COMPLETE BLOOD COUNT [id number]   ABS EOS             0.18 10e9/L 09/10/2

015 Unknown

 

          COMPLETE BLOOD COUNT [id number]   ABS BASO            0.03 10e9/L 09/10/2

015 Unknown

 

          COMPLETE BLOOD COUNT [id number]   RDW-SD              44.9 fL   09/10/201

5 Unknown

 

          LIPID GROUP [id number]     HDL TEST            42 MG/DL  09/10/2015 Unknown

 

          LIPID GROUP [id number]     TRIG                177 MG/DL 09/10/2015 Unknown

 

          LIPID GROUP [id number]     TEST LDL            72 MG/DL  09/10/2015 Unknown

 

          LIPID GROUP 31525     CHOL                149 MG/DL 09/10/2015 Unknown

 

          LIPID GROUP [id number]     RCHOL/HDL           3.55 RATIO 09/10/2015 Unknow

n

 

          LIPID GROUP [id number]     NON-HDL CH           107 MG/DL 09/10/2015 Unknow

n

 

          GLYCOSYLATED HEMOGLOBIN TEST 49162     A1C HPLC  87409-4   5.5 %     0

9/10/2015 Unknown

 

          FREE T4   85113     FREE T4             1.39 NG/DL 09/10/2015 Unknown

 

          GFR CALC  7689817   GFR AA              55.0L ML/MIN 09/10/2015 Unknow

n

 

          GFR CALC  6087239   GFR NON-AA           46.0L ML/MIN 09/10/2015 Unkno

wn

 

          COMPREHENSIVE METABOLIC 26081     AST                 17 U/L    09/10/

2015 Unknown

 

          COMPREHENSIVE METABOLIC 34853     ALT                 10 IU/L   09/10/

2015 Unknown

 

          COMPREHENSIVE METABOLIC 56063     BUN                 20 MG/DL  09/10/

2015 Unknown

 

          COMPREHENSIVE METABOLIC 70189     ALBUMIN             3.9 GM/DL 09/10/

2015 Unknown

 

          COMPREHENSIVE METABOLIC 88276     CHLORIDE            111 MMOL/L 09/10

/2015 Unknown

 

          COMPREHENSIVE METABOLIC 66409     BILI TOT            0.4 MG/DL 09/10/

2015 Unknown

 

          COMPREHENSIVE METABOLIC 16150     ALK PHOS            70 U/L    09/10/

2015 Unknown

 

          COMPREHENSIVE METABOLIC 63070     SODIUM              142 MMOL/L 09/10

/2015 Unknown

 

          COMPREHENSIVE METABOLIC 54519     CREATININE           1.16 MG/DL  Unknown

 

          COMPREHENSIVE METABOLIC 22737     CALCIUM             9.4 MG/DL 09/10/

2015 Unknown

 

          COMPREHENSIVE METABOLIC 82019     POTASSIUM           4.6 MMOL/L 09/10

/2015 Unknown

 

          COMPREHENSIVE METABOLIC 31321     PROT TOT            6.2 GM/DL 09/10/

2015 Unknown

 

          COMPREHENSIVE METABOLIC 80250     Glucose             90 MG/DL  09/10/

2015 Unknown

 

          COMPREHENSIVE METABOLIC 42867     BICARB              24 MMOL/L 09/10/

2015 Unknown

 

          COMPREHENSIVE METABOLIC 02488     ANION GAP           7 MEQ/L   09/10/

2015 Unknown

 

          THYROID STIMULATING HORMONE 10647     TSH                 2.427 uIU/ML

 2015 Unknown

 

          LIPID GROUP [id number]     HDL TEST            47 MG/DL  2015 Unknown

 

          LIPID GROUP 13792     TRIG                145 MG/DL 2015 Unknown

 

          LIPID GROUP 45937     TEST LDL            73 MG/DL  2015 Unknown

 

          LIPID GROUP 80823     CHOL                149 MG/DL 2015 Unknown

 

          LIPID GROUP 33949     RCHOL/HDL           3.17 RATIO 2015 Unknow

n

 

          LIPID GROUP 98149     NON-HDL CH           102 MG/DL 2015 Unknow

n

 

          COMPREHENSIVE METABOLIC 14861     AST                 17 U/L    2015 Unknown

 

          COMPREHENSIVE METABOLIC 34763     ALT                 9 IU/L    2015 Unknown

 

          COMPREHENSIVE METABOLIC 19652     BUN                 19 MG/DL  2015 Unknown

 

          COMPREHENSIVE METABOLIC 37747     ALBUMIN             4.3 GM/DL 2015 Unknown

 

          COMPREHENSIVE METABOLIC 68818     CHLORIDE            108 MMOL/L  Unknown

 

          COMPREHENSIVE METABOLIC 14664     BILI TOT            0.5 MG/DL 2015 Unknown

 

          COMPREHENSIVE METABOLIC 28718     ALK PHOS            68 U/L    2015 Unknown

 

          COMPREHENSIVE METABOLIC 50593     SODIUM              140 MMOL/L  Unknown

 

          COMPREHENSIVE METABOLIC 05099     CREATININE           1.08 MG/DL 2015 Unknown

 

          COMPREHENSIVE METABOLIC 29393     CALCIUM             9.9 MG/DL 2015 Unknown

 

          COMPREHENSIVE METABOLIC 51301     POTASSIUM           4.3 MMOL/L  Unknown

 

          COMPREHENSIVE METABOLIC 26366     PROT TOT            7.2 GM/DL 2015 Unknown

 

          COMPREHENSIVE METABOLIC 71408     Glucose             94 MG/DL  2015 Unknown

 

          COMPREHENSIVE METABOLIC 77740     BICARB              26 MMOL/L 2015 Unknown

 

          COMPREHENSIVE METABOLIC 23923     ANION GAP           6 MEQ/L   2015 Unknown

 

          GFR CALC  8928646   GFR AA              60.0L ML/MIN 2015 Unknow

n

 

          GFR CALC  9212535   GFR NON-AA           49.0L ML/MIN 2015 Unkno

wn

 

          GLYCOSYLATED HEMOGLOBIN TEST 48901     A1C HPLC  31087-5   5.6 %     0

3/06/2015 Unknown

 

          COMPLETE BLOOD COUNT 1110021   WBC                 7.2 10e9/L 20

15 Unknown

 

          COMPLETE BLOOD COUNT [id number]   RBC                 4.28 10e12/L 2015 Unknown

 

          COMPLETE BLOOD COUNT 9429067   HGB                 12.8 g/dL 

5 Unknown

 

          COMPLETE BLOOD COUNT [id number]   HCT DET             39.3 %    

5 Unknown

 

          COMPLETE BLOOD COUNT 5399787   MCV                 91.8 fL   

5 Unknown

 

          COMPLETE BLOOD COUNT 3067037   MCH                 29.9 pg   

5 Unknown

 

          COMPLETE BLOOD COUNT [id number]   MCHC                32.6 g/dL 

5 Unknown

 

          COMPLETE BLOOD COUNT 0372402   PLT                 189 10e9/L 20

15 Unknown

 

          COMPLETE BLOOD COUNT 6564333   MPV                 11.2 fL   

5 Unknown

 

          COMPLETE BLOOD COUNT 0598493   ARIK %               38.0 %    

5 Unknown

 

          COMPLETE BLOOD COUNT [id number]   LY %                51.0 %    

5 Unknown

 

          COMPLETE BLOOD COUNT [id number]   MON %               7.7 %     

5 Unknown

 

          COMPLETE BLOOD COUNT [id number]   EOS  %              2.9 %     

5 Unknown

 

          COMPLETE BLOOD COUNT 0287856   BASO %              0.4 %     

5 Unknown

 

          COMPLETE BLOOD COUNT 1658543   RDW                 14.0 %    

5 Unknown

 

          COMPLETE BLOOD COUNT [id number]   ABS ARIK             2.74 10e9/L 

015 Unknown

 

          COMPLETE BLOOD COUNT 7144467   ABS LYMPH           3.67 10e9/L 

015 Unknown

 

          COMPLETE BLOOD COUNT 1452219   ABS MONO            0.55 10e9/L 

015 Unknown

 

          COMPLETE BLOOD COUNT 4093819   ABS EOS             0.21 10e9/L 

015 Unknown

 

          COMPLETE BLOOD COUNT 6226922   ABS BASO            0.03 10e9/L 

015 Unknown

 

          COMPLETE BLOOD COUNT [id number]   RDW-SD              46.1 fL   

5 Unknown

 

          FREE T4   54551     FREE T4             1.14 NG/DL 2015 Unknown

 

          GLYCOSYLATED HEMOGLOBIN TEST 43552     A1C HPLC  98302-6   5.2 %     0

2014 Unknown

 

          FREE T4   96861     FREE T4             1.40 NG/DL 2014 Unknown

 

          GFR CALC  7489509   GFR AA              >60 ML/MIN 2014 Unknown

 

          GFR CALC  3075610   GFR NON-AA           52.0L ML/MIN 2014 Unkno

wn

 

          COMPREHENSIVE METABOLIC 95466     AST                 15 U/L    2014 Unknown

 

          COMPREHENSIVE METABOLIC 91331     ALT                 9 IU/L    2014 Unknown

 

          COMPREHENSIVE METABOLIC 76033     BUN                 17 MG/DL  2014 Unknown

 

          COMPREHENSIVE METABOLIC 75747     ALBUMIN             4.0 GM/DL 2014 Unknown

 

          COMPREHENSIVE METABOLIC 06604     CHLORIDE            112 MMOL/L  Unknown

 

          COMPREHENSIVE METABOLIC 69385     BILI TOT            0.5 MG/DL 2014 Unknown

 

          COMPREHENSIVE METABOLIC 32061     ALK PHOS            66 U/L    2014 Unknown

 

          COMPREHENSIVE METABOLIC 00325     SODIUM              140 MMOL/L  Unknown

 

          COMPREHENSIVE METABOLIC 95500     CREATININE           1.03 MG/DL  Unknown

 

          COMPREHENSIVE METABOLIC 23922     CALCIUM             9.5 MG/DL 2014 Unknown

 

          COMPREHENSIVE METABOLIC 99611     POTASSIUM           4.1 MMOL/L  Unknown

 

          COMPREHENSIVE METABOLIC 82369     PROT TOT            6.2 GM/DL 2014 Unknown

 

          COMPREHENSIVE METABOLIC 59809     Glucose             102 MG/DL 2014 Unknown

 

          COMPREHENSIVE METABOLIC 16510     BICARB              23 MMOL/L 2014 Unknown

 

          COMPREHENSIVE METABOLIC 11540     ANION GAP           5 MEQ/L   2014 Unknown

 

          THYROID STIMULATING HORMONE 55484     TSH                 2.074 uIU/ML

 2014 Unknown

 

          VITAMIN B 12 FOLIC ACID [id number] VIT B 12            423 PG/ML  Unknown

 

          VITAMIN B 12 FOLIC ACID [id number] FOLIC ACID           19.7 NG/ML  Unknown

 

          LIPID GROUP 89227     HDL TEST            40 MG/DL  2014 Unknown

 

          LIPID GROUP 21806     TRIG                145 MG/DL 2014 Unknown

 

          LIPID GROUP 26864     TEST LDL            81 MG/DL  2014 Unknown

 

          LIPID GROUP 46946     CHOL                150 MG/DL 2014 Unknown

 

          LIPID GROUP 98319     RCHOL/HDL           3.75 RATIO 2014 Unknow

n

 

          COMPLETE BLOOD COUNT 5774339   WBC                 6.0 10e9/L 20

14 Unknown

 

          COMPLETE BLOOD COUNT 1860033   RBC                 4.26 10e12/L 2014 Unknown

 

          COMPLETE BLOOD COUNT 3587117   HGB                 12.7 g/dL 

4 Unknown

 

          COMPLETE BLOOD COUNT 6063053   HCT DET             38.7 %    

4 Unknown

 

          COMPLETE BLOOD COUNT 9422570   MCV                 90.8 fL   

4 Unknown

 

          COMPLETE BLOOD COUNT [id number]   MCH                 29.8 pg   

4 Unknown

 

          COMPLETE BLOOD COUNT [id number]   MCHC                32.8 g/dL 

4 Unknown

 

          COMPLETE BLOOD COUNT 1058423   PLT                 178 10e9/L 20

14 Unknown

 

          COMPLETE BLOOD COUNT 9872234   MPV                 11.7 fL   

4 Unknown

 

          COMPLETE BLOOD COUNT [id number]   ARIK %               30.5 %    

4 Unknown

 

          COMPLETE BLOOD COUNT 3588279   LY %                55.4 %    

4 Unknown

 

          COMPLETE BLOOD COUNT 6601706   MON %               9.0 %     

4 Unknown

 

          COMPLETE BLOOD COUNT [id number]   EOS  %              4.4 %     

4 Unknown

 

          COMPLETE BLOOD COUNT [id number]   BASO %              0.7 %     

4 Unknown

 

          COMPLETE BLOOD COUNT 5405793   RDW                 13.3 %    

4 Unknown

 

          COMPLETE BLOOD COUNT 2825004   ABS ARIK             1.83 10e9/L 

014 Unknown

 

          COMPLETE BLOOD COUNT [id number]   ABS LYMPH           3.32 10e9/L 

014 Unknown

 

          COMPLETE BLOOD COUNT [id number]   ABS MONO            0.54 10e9/L 

014 Unknown

 

          COMPLETE BLOOD COUNT [id number]   ABS EOS             0.26 10e9/L 

014 Unknown

 

          COMPLETE BLOOD COUNT [id number]   ABS BASO            0.04 10e9/L 

014 Unknown

 

          COMPLETE BLOOD COUNT [id number]   RDW-SD              43.2 fL   

4 Unknown

 

          HEMOGLOBIN A1C (GLYCOSYLATED) 0215219   A1C [facility]  57961-0   5.5 %     

2012 Unknown

 

          COMPLETE BLOOD COUNT [id number]   WBC                 6.0 10e9/L 20

12 Unknown

 

          COMPLETE BLOOD COUNT [id number]   RBC                 4.22 10e12/L 2012 Unknown

 

          COMPLETE BLOOD COUNT 2550927   HGB                 12.4 g/dL 

2 Unknown

 

          COMPLETE BLOOD COUNT [id number]   HCT DET             38.2 %    

2 Unknown

 

          COMPLETE BLOOD COUNT 3570961   MCV                 90.5 fL   

2 Unknown

 

          COMPLETE BLOOD COUNT 6472354   MCH                 29.4 pg   

2 Unknown

 

          COMPLETE BLOOD COUNT [id number]   MCHC                32.5 g/dL 

2 Unknown

 

          COMPLETE BLOOD COUNT [id number]   PLT                 187 10e9/L 20

12 Unknown

 

          COMPLETE BLOOD COUNT [id number]   MPV                 11.5 fL   

2 Unknown

 

          COMPLETE BLOOD COUNT [id number]   ARIK %               36.4 %    

2 Unknown

 

          COMPLETE BLOOD COUNT 8155106   LY %                51.0 %    

2 Unknown

 

          COMPLETE BLOOD COUNT [id number]   MON %               8.7 %     

2 Unknown

 

          COMPLETE BLOOD COUNT 1900509   EOS  %              3.2 %     

2 Unknown

 

          COMPLETE BLOOD COUNT [id number]   BASO %              0.7 %     

2 Unknown

 

          COMPLETE BLOOD COUNT [id number]   RDW                 13.7 %    

2 Unknown

 

          COMPLETE BLOOD COUNT [id number]   ABS ARIK             2.18 10e9/L 

012 Unknown

 

          COMPLETE BLOOD COUNT [id number]   ABS LYMPH           3.06 10e9/L 

012 Unknown

 

          COMPLETE BLOOD COUNT [id number]   ABS MONO            0.52 10e9/L 

012 Unknown

 

          COMPLETE BLOOD COUNT [id number]   ABS EOS             0.19 10e9/L 

012 Unknown

 

          COMPLETE BLOOD COUNT 4298507   ABS BASO            0.04 10e9/L 

012 Unknown

 

          COMPLETE BLOOD COUNT [id number]   RDW-SD              44.3 fL   

2 Unknown

 

          LIPID GROUP [id number]     HDL TEST            42 MG/DL  2012 Unknown

 

          LIPID GROUP [id number]     TRIG                156 MG/DL 2012 Unknown

 

          LIPID GROUP [id number]     TEST LDL            80 MG/DL  2012 Unknown

 

          LIPID GROUP [id number]     CHOL                153 MG/DL 2012 Unknown

 

          LIPID GROUP 75459     RCHOL/HDL           3.64 RATIO 2012 Unknow

n

 

          FREE T4   38931     FREE T4             1.22 NG/DL 2012 Unknown

 

          COMPREHENSIVE METABOLIC [address]8425     AST                 20 U/L    2012 Unknown

 

          COMPREHENSIVE METABOLIC 22850     ALT                 11 IU/L   2012 Unknown

 

          COMPREHENSIVE METABOLIC 90570     BUN                 19 MG/DL  2012 Unknown

 

          COMPREHENSIVE METABOLIC 36506     ALBUMIN             4.3 GM/DL 2012 Unknown

 

          COMPREHENSIVE METABOLIC 01679     CHLORIDE            109 MMOL/L  Unknown

 

          COMPREHENSIVE METABOLIC 67068     BILI TOT            0.6 MG/DL 2012 Unknown

 

          COMPREHENSIVE METABOLIC 82038     ALK PHOS            84 U/L    2012 Unknown

 

          COMPREHENSIVE METABOLIC 52406     SODIUM              142 MMOL/L  Unknown

 

          COMPREHENSIVE METABOLIC 02417     CREATININE           1.09 MG/DL  Unknown

 

          COMPREHENSIVE METABOLIC 94890     CALCIUM             9.8 MG/DL 2012 Unknown

 

          COMPREHENSIVE METABOLIC 82274     POTASSIUM           4.2 MMOL/L  Unknown

 

          COMPREHENSIVE METABOLIC 76421     PROT TOT            6.4 GM/DL 2012 Unknown

 

          COMPREHENSIVE METABOLIC 70711     Glucose             89 MG/DL  2012 Unknown

 

          COMPREHENSIVE METABOLIC 34115     BICARB              25 MMOL/L 2012 Unknown

 

          COMPREHENSIVE METABOLIC 84923     ANION GAP           8 MEQ/L   2012 Unknown

 

          GFR CALC  9129525   GFR AA              60.0L ML/MIN 2012 Unknow

n

 

          GFR CALC  5660602   GFR NON-AA           49.0L ML/MIN 2012 Unkno

wn

 

          THYROID STIMULATING HORMONE 92355     TSH                 2.450 uIU/ML

 2012 Unknown

 

          COMPREHENSIVE METABOLIC 81386     AST                 22 U/L    2012 Unknown

 

          COMPREHENSIVE METABOLIC 36405     ALT                 14 IU/L   2012 Unknown

 

          COMPREHENSIVE METABOLIC 87376     BUN                 21 MG/DL  2012 Unknown

 

          COMPREHENSIVE METABOLIC 64171     ALBUMIN             4.3 GM/DL 2012 Unknown

 

          COMPREHENSIVE METABOLIC 89428     CHLORIDE            106 MMOL/L  Unknown

 

          COMPREHENSIVE METABOLIC 58401     BILI TOT            0.4 MG/DL 2012 Unknown

 

          COMPREHENSIVE METABOLIC 91543     ALK PHOS            80 U/L    2012 Unknown

 

          COMPREHENSIVE METABOLIC 38674     SODIUM              141 MMOL/L  Unknown

 

          COMPREHENSIVE METABOLIC 91396     CREATININE           1.13 MG/DL  Unknown

 

          COMPREHENSIVE METABOLIC 83256     CALCIUM             9.4 MG/DL 2012 Unknown

 

          COMPREHENSIVE METABOLIC 69952     POTASSIUM           4.3 MMOL/L  Unknown

 

          COMPREHENSIVE METABOLIC 85981     PROT TOT            6.7 GM/DL 2012 Unknown

 

          COMPREHENSIVE METABOLIC 29001     Glucose             98 MG/DL  2012 Unknown

 

          COMPREHENSIVE METABOLIC 48955     BICARB              25 MMOL/L 2012 Unknown

 

          COMPREHENSIVE METABOLIC 46110     ANION GAP           10 MEQ/L  2012 Unknown

 

          LIPID GROUP [id number]     HDL TEST            44 MG/DL  2012 Unknown

 

          LIPID GROUP 65189     TRIG                164 MG/DL 2012 Unknown

 

          LIPID GROUP [id number]     TEST LDL            98 MG/DL  2012 Unknown

 

          LIPID GROUP 05511     CHOL                175 MG/DL 2012 Unknown

 

          LIPID GROUP [id number]     RCHOL/HDL           3.98 RATIO 2012 Unknow

n

 

          COMPLETE BLOOD COUNT 35094     WBC                 6.7 10e9/L 20

12 Unknown

 

          COMPLETE BLOOD COUNT 20246     RBC                 4.36 10e12/L 2012 Unknown

 

          COMPLETE BLOOD COUNT 58910     HGB                 12.9 g/dL 

2 Unknown

 

          COMPLETE BLOOD COUNT 48828     HCT DET             39.4 %    

2 Unknown

 

          COMPLETE BLOOD COUNT 62083     MCV                 90.4 fL   

2 Unknown

 

          COMPLETE BLOOD COUNT 87615     MCH                 29.6 pg   

2 Unknown

 

          COMPLETE BLOOD COUNT 90225     MCHC                32.7 g/dL 

2 Unknown

 

          COMPLETE BLOOD COUNT 68060     PLT                 184 10e9/L 20

12 Unknown

 

          COMPLETE BLOOD COUNT 78067     MPV                 10.9 fL   

2 Unknown

 

          COMPLETE BLOOD COUNT 45275     ARIK %               41.5 %    

2 Unknown

 

          COMPLETE BLOOD COUNT 51309     LY %                45.7 %    

2 Unknown

 

          COMPLETE BLOOD COUNT 29409     MON %               9.4 %     

2 Unknown

 

          COMPLETE BLOOD COUNT 25642     EOS  %              3.0 %     

2 Unknown

 

          COMPLETE BLOOD COUNT 13128     BASO %              0.4 %     

2 Unknown

 

          COMPLETE BLOOD COUNT 49217     RDW                 13.2 %    

2 Unknown

 

          COMPLETE BLOOD COUNT 45448     ABS ARIK             2.78 10e9/L 

012 Unknown

 

          COMPLETE BLOOD COUNT 54912     ABS LYMPH           3.06 10e9/L 

012 Unknown

 

          COMPLETE BLOOD COUNT 11099     ABS MONO            0.63 10e9/L 

012 Unknown

 

          COMPLETE BLOOD COUNT 13737     ABS EOS             0.20 10e9/L 

012 Unknown

 

          COMPLETE BLOOD COUNT 55843     ABS BASO            0.03 10e9/L 

012 Unknown

 

          COMPLETE BLOOD COUNT 88058     RDW-SD              42.3 fL   

2 Unknown

 

          GFR CALC  [id number]   GFR AA              57.0L ML/MIN 2012 Unknow

n

 

          GFR CALC  4967360   GFR NON-AA           47.0L ML/MIN 2012 Unkno

wn

 

          THYROID STIMULATING HORMONE 17478     TSH                 2.663 uIU/ML

 2012 Unknown

 

          FREE T4   34898     FREE T4             1.15 NG/DL 2012 Unknown

 

          THYROID STIMULATING HORMONE 78420     TSH                 1.908 uIU/ML

 2011 Unknown

 

          COMPLETE BLOOD COUNT 18482     WBC                 6.4 10e9/L 20

11 Unknown

 

          COMPLETE BLOOD COUNT 21756     RBC                 3.92 10e12/L 2011 Unknown

 

          COMPLETE BLOOD COUNT 37589     HGB                 11.8 g/dL 

1 Unknown

 

          COMPLETE BLOOD COUNT 03458     HCT DET             36.0 %    

1 Unknown

 

          COMPLETE BLOOD COUNT 55186     MCV                 91.8 fL   

1 Unknown

 

          COMPLETE BLOOD COUNT 32627     MCH                 30.1 pg   

1 Unknown

 

          COMPLETE BLOOD COUNT 67442     MCHC                32.8 g/dL 

1 Unknown

 

          COMPLETE BLOOD COUNT 67005     PLT                 176 10e9/L 20

11 Unknown

 

          COMPLETE BLOOD COUNT 04910     MPV                 11.4 fL   

1 Unknown

 

          COMPLETE BLOOD COUNT 57205     ARIK %               50.4 %    

1 Unknown

 

          COMPLETE BLOOD COUNT 26411     LY %                35.5 %    

1 Unknown

 

          COMPLETE BLOOD COUNT 19165     MON %               10.2 %    

1 Unknown

 

          COMPLETE BLOOD COUNT 52526     EOS  %              3.3 %     

1 Unknown

 

          COMPLETE BLOOD COUNT 14347     BASO %              0.6 %     

1 Unknown

 

          COMPLETE BLOOD COUNT 66896     RDW                 13.7 %    

1 Unknown

 

          COMPLETE BLOOD COUNT 69628     ABS RAIK             3.23 10e9/L 

011 Unknown

 

          COMPLETE BLOOD COUNT 52803     ABS LYMPH           2.27 10e9/L 

011 Unknown

 

          COMPLETE BLOOD COUNT 24560     ABS MONO            0.65 10e9/L 

011 Unknown

 

          COMPLETE BLOOD COUNT 02971     ABS EOS             0.21 10e9/L 

011 Unknown

 

          COMPLETE BLOOD COUNT 89404     ABS BASO            0.04 10e9/L 

011 Unknown

 

          COMPLETE BLOOD COUNT 47770     RDW-SD              45.3 fL   

1 Unknown

 

          GFR CALC  1471831   GFR AA              >60 ML/MIN 2011 Unknown

 

          GFR CALC  9936528   GFR NON-AA           53.0L ML/MIN 2011 Unkno

wn

 

          FREE T4   53323     FREE T4             1.20 NG/DL 2011 Unknown

 

          COMPREHENSIVE METABOLIC 76555     AST                 17 U/L    2011 Unknown

 

          COMPREHENSIVE METABOLIC 73463     ALT                 9 IU/L    2011 Unknown

 

          COMPREHENSIVE METABOLIC 72217     BUN                 16 MG/DL  2011 Unknown

 

          COMPREHENSIVE METABOLIC 77798     ALBUMIN             4.0 GM/DL 2011 Unknown

 

          COMPREHENSIVE METABOLIC 64110     CHLORIDE            108 MMOL/L  Unknown

 

          COMPREHENSIVE METABOLIC 62639     BILI TOT            0.5 MG/DL 2011 Unknown

 

          COMPREHENSIVE METABOLIC 01609     ALK PHOS            76 U/L    2011 Unknown

 

          COMPREHENSIVE METABOLIC 26820     SODIUM              139 MMOL/L  Unknown

 

          COMPREHENSIVE METABOLIC 63700     CREATININE           1.02 MG/DL 2011 Unknown

 

          COMPREHENSIVE METABOLIC 06120     CALCIUM             9.2 MG/DL 2011 Unknown

 

          COMPREHENSIVE METABOLIC 74039     POTASSIUM           4.5 MMOL/L  Unknown

 

          COMPREHENSIVE METABOLIC 66118     PROT TOT            6.1 GM/DL 2011 Unknown

 

          COMPREHENSIVE METABOLIC 92771     Glucose             93 MG/DL  2011 Unknown

 

          COMPREHENSIVE METABOLIC 39165     BICARB              26 MMOL/L 2011 Unknown

 

          COMPREHENSIVE METABOLIC 50966     ANION GAP           5 MEQ/L   2011 Unknown

 

          LIPID GROUP 19799     HDL TEST            46 MG/DL  2011 Unknown

 

          LIPID GROUP 87506     TRIG                102 MG/DL 2011 Unknown

 

          LIPID GROUP 44956     TEST LDL            88 MG/DL  2011 Unknown

 

          LIPID GROUP 34790     CHOL                154 MG/DL 2011 Unknown

 

          LIPID GROUP 50460     RCHOL/HDL           3.35 RATIO 2011 Unknow

n







Procedures





                    Procedure           Codes               Date

 

                    ROUTINE VENIPUNCTURE CPT-4: 15325        2020

 

                    ASSAY THYROID STIM HORMONE CPT-4: 22274        2020

 

                    COMPLETE CBC W/AUTO DIFF WBC CPT-4: 67207        2020

 

                    COMPREHEN METABOLIC PANEL CPT-4: 46025        2020

 

                    ROUTINE VENIPUNCTURE CPT-4: 41040        2019

 

                    LIPID PANEL         CPT-4: 49338        2019

 

                    FLU VACC PRSV FREE INC ANTIG 65 AND OLDER CPT-4: 40915      

  10/22/2019

 

                    FLU VACC PRSV FREE INC ANTIG 65 AND OLDER CPT-4: 83461      

  10/22/2019

 

                    ADMIN INFLUENZA VIRUS VAC CPT-4:         10/22/2019

 

                    COMPREHEN METABOLIC PANEL CPT-4: 65455        10/11/2019

 

                    ROUTINE VENIPUNCTURE CPT-4: 06816        10/11/2019

 

                    ROUTINE VENIPUNCTURE CPT-4: 81287        06/10/2019

 

                    ASSAY THYROID STIM HORMONE CPT-4: 52022        06/10/2019

 

                    COMPREHEN METABOLIC PANEL CPT-4: 33964        06/10/2019

 

                    COMPLETE CBC W/AUTO DIFF WBC CPT-4: 99910        06/10/2019

 

                    URINALYSIS NONAUTO W/O SCOPE CPT-4: 70868        2019

 

                    URINE CULTURE/ COLONY COUNT CPT-4: 06555        2019

 

                    URINE CULTURE/ COLONY COUNT CPT-4: 84462        10/02/2018

 

                    ROUTINE VENIPUNCTURE CPT-4: 10954        2018

 

                    ASSAY OF FREE THYROXINE CPT-4: 50738        2018

 

                    ASSAY THYROID STIM HORMONE CPT-4: 57629        2018

 

                    COMPLETE CBC W/AUTO DIFF WBC CPT-4: 12021        2018

 

                    METABOLIC PANEL TOTAL CA CPT-4: 24534        2018

 

                    FLU VACC PRSV FREE INC ANTIG 65 AND OLDER CPT-4: 25162      

  2018

 

                    ASSAY, GLUCOSE, BLOOD QUANT CPT-4: 03801        2018

 

                    ADMIN INFLUENZA VIRUS VAC CPT-4:         2018

 

                    ROUTINE VENIPUNCTURE CPT-4: 63879        2018

 

                    COMPREHEN METABOLIC PANEL CPT-4: 46639        2018

 

                    COMPLETE CBC W/AUTO DIFF WBC CPT-4: 58741        2018

 

                    A1C HPLC            CPT-4: 05764        2018

 

                    ASSAY OF TROPONIN QUANT CPT-4: 26184        2018

 

                    LIPID PANEL         CPT-4: 33723        2018

 

                    THER/PROPH/DIAG INJ SC/IM CPT-4: 29482        2018

 

                    TRIAMCINOLONE ACET INJ NOS CPT-4:         2018

 

                    URINALYSIS NONAUTO W/O SCOPE CPT-4: 06137        2018

 

                    URINE CULTURE/ COLONY COUNT CPT-4: 46025        2018

 

                    FLU VACC PRSV FREE INC ANTIG 65 AND OLDER CPT-4: 29842      

  10/06/2017

 

                    ADMIN INFLUENZA VIRUS VAC CPT-4:         10/06/2017

 

                    ROUTINE VENIPUNCTURE CPT-4: 46963        2017

 

                    COMPREHEN METABOLIC PANEL CPT-4: 05025        2017

 

                    COMPLETE CBC W/AUTO DIFF WBC CPT-4: 78651        2017

 

                    LIPID PANEL         CPT-4: 85184        2017

 

                    A1C HPLC            CPT-4: 46667        2017

 

                    ASSAY THYROID STIM HORMONE CPT-4: 46728        2017

 

                    ROUTINE VENIPUNCTURE CPT-4: 32203        2017

 

                    ASSAY THYROID STIM HORMONE CPT-4: 99828        2017

 

                    COMPREHEN METABOLIC PANEL CPT-4: 95976        2017

 

                    COMPLETE CBC W/AUTO DIFF WBC CPT-4: 56304        2017

 

                    A1C HPLC            CPT-4: 07956        2017

 

                    FLU VACC PRSV FREE INC ANTIG 65 AND OLDER CPT-4: 01759      

  10/07/2016

 

                    ADMIN INFLUENZA VIRUS VAC CPT-4:         10/07/2016

 

                    ROUTINE VENIPUNCTURE CPT-4: 95474        2016

 

                    ASSAY OF FREE THYROXINE CPT-4: 81210        2016

 

                    ASSAY THYROID STIM HORMONE CPT-4: 45852        2016

 

                    COMPREHEN METABOLIC PANEL CPT-4: 28599        2016

 

                    COMPLETE CBC W/AUTO DIFF WBC CPT-4: 43560        2016

 

                    LIPID PANEL         CPT-4: 45157        2016

 

                    A1C HPLC            CPT-4: 33562        2016

 

                    URINALYSIS NONAUTO W/O SCOPE CPT-4: 45420        2016

 

                    ROUTINE VENIPUNCTURE CPT-4: 97872        2015

 

                    METABOLIC PANEL TOTAL CA CPT-4: 39664        2015

 

                    PRESCRIP TRANSMIT VIA ERX SY CPT-4:         2015

 

                    FLU VACC PRSV FREE INC ANTIG 65 AND OLDER CPT-4: 73430      

  10/16/2015

 

                    ADMIN INFLUENZA VIRUS VAC CPT-4:         10/16/2015

 

                    URINALYSIS NONAUTO W/O SCOPE CPT-4: 67299        09/15/2015

 

                    URINE CULTURE/ COLONY COUNT CPT-4: 77659        09/15/2015

 

                    ROUTINE VENIPUNCTURE CPT-4: 31575        09/10/2015

 

                    ASSAY OF FREE THYROXINE CPT-4: 37919        09/10/2015

 

                    ASSAY THYROID STIM HORMONE CPT-4: 44735        09/10/2015

 

                    COMPREHEN METABOLIC PANEL CPT-4: 49114        09/10/2015

 

                    COMPLETE CBC W/AUTO DIFF WBC CPT-4: 42649        09/10/2015

 

                    LIPID PANEL         CPT-4: 86876        09/10/2015

 

                    A1C HPLC            CPT-4: 63936        09/10/2015

 

                    CERUM REMOVAL       CPT-4: 36833        2015

 

                    PRESCRIP TRANSMIT VIA ERX SY CPT-4:         2015

 

                    FLUZONE, 5ML (Medicare) CPT-4:         10/17/2014

 

                    ADMIN INFLUENZA VIRUS VAC CPT-4:         10/17/2014

 

                    PRESCRIP TRANSMIT VIA ERX SY CPT-4:         2014

 

                    PRESCRIP TRANSMIT VIA ERX SY CPT-4:         2014

 

                    PRESCRIP TRANSMIT VIA ERX SY CPT-4:         2014

 

                    ROUTINE VENIPUNCTURE CPT-4: 03765        2014

 

                    ASSAY OF FREE THYROXINE CPT-4: 66081        2014

 

                    ASSAY THYROID STIM HORMONE CPT-4: 16151        2014

 

                    COMPREHEN METABOLIC PANEL CPT-4: 06683        2014

 

                    COMPLETE CBC W/AUTO DIFF WBC CPT-4: 18349        2014

 

                    LIPID PANEL         CPT-4: 81501        2014

 

                    A1C HPLC            CPT-4: 60145        2014

 

                    VITAMIN B 12 FOLIC ACID CPT-4: 99888|30419  2014

 

                    PRESCRIP TRANSMIT VIA ERX SY CPT-4:         2014

 

                    PRESCRIP TRANSMIT VIA ERX SY CPT-4:         10/15/2013

 

                    FLUZONE, 5ML (Medicare) CPT-4:         2013

 

                    ADMIN INFLUENZA VIRUS VAC CPT-4:         2013

 

                    PRESCRIP TRANSMIT VIA ERX SY CPT-4:         2013

 

                    PRESCRIP TRANSMIT VIA ERX SY CPT-4:         05/10/2013

 

                    ROUTINE VENIPUNCTURE CPT-4: 17646        2012

 

                    ASSAY OF FREE THYROXINE CPT-4: 74788        2012

 

                    ASSAY THYROID STIM HORMONE CPT-4: 04569        2012

 

                    COMPREHEN METABOLIC PANEL CPT-4: 75435        2012

 

                    COMPLETE CBC W/AUTO DIFF WBC CPT-4: 80635        2012

 

                    LIPID PANEL         CPT-4: 77374        2012

 

                    A1C GLYCOSYLATED HEMOGLOBIN TEST CPT-4: 71603        

012

 

                    CERUM REMOVAL       CPT-4: 51500        2012

 

                    PRESCRIP TRANSMIT VIA ERX SY CPT-4:         2012

 

                    PRESCRIP TRANSMIT VIA ERX SY CPT-4:         10/10/2012

 

                    FLUZONE, 5ML (Medicare) CPT-4:         2012

 

                    ADMIN INFLUENZA VIRUS VAC CPT-4:         2012

 

                    ASSAY, GLUCOSE, BLOOD QUANT CPT-4: 61269        2012

 

                    URINALYSIS NONAUTO W/O SCOPE CPT-4: 46845        2012

 

                    URINE CULTURE/ COLONY COUNT CPT-4: 87455        2012

 

                    ROUTINE VENIPUNCTURE CPT-4: 39241        2012

 

                    ASSAY OF FREE THYROXINE CPT-4: 70343        2012

 

                    ASSAY THYROID STIM HORMONE CPT-4: 88685        2012

 

                    COMPREHEN METABOLIC PANEL CPT-4: 23664        2012

 

                    COMPLETE CBC W/AUTO DIFF WBC CPT-4: 14653        2012

 

                    LIPID PANEL         CPT-4: 08113        2012

 

                    ASSAY OF INSULIN    CPT-4: 66210        2012

 

                    A1C GLYCOSYLATED HEMOGLOBIN TEST CPT-4: 45206        

012

 

                    DRAIN/INJECT JOINT/BURSA CPT-4: 18202        2012

 

                    METHYLPREDNISOLONE 40 MG INJ CPT-4:         2012

 

                    TRIAMCINOLONE ACET INJ NOS CPT-4:         2012

 

                    PRESCRIP TRANSMIT VIA ERX SY CPT-4:         2012

 

                    PRESCRIP TRANSMIT VIA ERX SY CPT-4:         2012

 

                    METHYLPREDNISOLONE 40 MG INJ CPT-4:         2012

 

                    DRAIN/INJECT JOINT/BURSA CPT-4: 63706        2012

 

                    TRIAMCINOLONE ACET INJ NOS CPT-4:         2012

 

                    PRESCRIP TRANSMIT VIA ERX SY CPT-4:         2012

 

                    ROUTINE VENIPUNCTURE CPT-4: 31144        2012

 

                    ASSAY OF FREE THYROXINE CPT-4: 21066        2012

 

                    ASSAY THYROID STIM HORMONE CPT-4: 87250        2012

 

                    COMPREHEN METABOLIC PANEL CPT-4: 15117        2012

 

                    COMPLETE CBC W/AUTO DIFF WBC CPT-4: 46966        2012

 

                    LIPID PANEL         CPT-4: 23102        2012

 

                    PRESCRIP TRANSMIT VIA ERX SY CPT-4:         2012

 

                    CERUM REMOVAL       CPT-4: 90005        2011

 

                    PRESCRIP TRANSMIT VIA ERX SY CPT-4:         2011

 

                    FLUZONE, 5ML (Medicare) CPT-4:         10/05/2011

 

                    ADMIN INFLUENZA VIRUS VAC CPT-4:         10/05/2011

 

                    PRESCRIP TRANSMIT VIA ERX SY CPT-4:         2011

 

                    URINALYSIS NONAUTO W/O SCOPE CPT-4: 20785        2011

 

                    URINE CULTURE/ COLONY COUNT CPT-4: 46616        2011

 

                    CUR TOBACCO NON-USER CPT-4:         2011

 

                    ROUTINE VENIPUNCTURE CPT-4: 91370        2011

 

                    COMPLETE CBC W/AUTO DIFF WBC CPT-4: 88797        2011

 

                    COMPREHEN METABOLIC PANEL CPT-4: 19055        2011

 

                    LIPID PANEL         CPT-4: 55422        2011

 

                    ASSAY THYROID STIM HORMONE CPT-4: 35954        2011

 

                    ASSAY OF FREE THYROXINE CPT-4: 23455        2011

 

                    PRESCRIP TRANSMIT VIA ERX SY CPT-4:         2011

 

                    INJ TRIGGER POINT 1/2 MUSCL CPT-4: 14325        2011

 

                    TRIAMCINOLONE ACET INJ NOS CPT-4:         2011

 

                    METHYLPREDNISOLONE 40 MG INJ CPT-4:         2011

 

                    THER/PROPH/DIAG INJ SC/IM CPT-4: 22249        2011

 

                    KETOROLAC TROMETHAMINE INJ CPT-4:         2011

 

                    PRESCRIP TRANSMIT VIA ERX SY CPT-4:         2010

 

                    FLU VACCINE 3 YRS & > IM UP 64 CPT-4: 51061        10/06/201

0

 

                    ADMIN INFLUENZA VIRUS VAC CPT-4:         10/06/2010

 

                    URINALYSIS NONAUTO W/O SCOPE CPT-4: 83845        2010

 

                    URINE CULTURE/ COLONY COUNT CPT-4: 99758        2010

 

                    PRESCRIP TRANSMIT VIA ERX SY CPT-4:         2010

 

                    THER/PROPH/DIAG INJ SC/IM CPT-4: 00835        2010

 

                    VITAMIN B12 INJECTION CPT-4:         2010

 

                    THER/PROPH/DIAG INJ SC/IM CPT-4: 91341        2010

 

                    VITAMIN B12 INJECTION CPT-4:         2010

 

                    ROUTINE VENIPUNCTURE CPT-4: 90891        2010







Vital Signs





                          Date                      Vital

 

                    2020          Blood Pressure 1: 144/82 Code: 8480-6 He

art Rate 1: 56 bpm

 

                2020      Blood Pressure 1: 154/86 Code: 8480-6 Heart Rate

 1: 64 bpm 

Respiratory Rate: 20 bpm SpO2: 99%           Temperature: 37.1 (C) / 98.7 (F) We

ight: 215 

lbs 

 

             2019   Blood Pressure 1: 140/70 Code: 8480-6 Heart Rate 1: 57

 bpm SpO2: 99%    

Temperature: 35.9 (C) / 96.6 (F)        Weight: 215 lbs 

 

                06/10/2019      Blood Pressure 1: 144/70 Code: 8480-6 Heart Rate

 1: 60 bpm 

Respiratory Rate: 20 bpm SpO2: 95%           Temperature: 36.4 (C) / 97.6 (F) We

ight: 214 

lbs 

 

                2019      Blood Pressure 1: 128/78 Code: 8480-6 Heart Rate

 1: 56 bpm 

Respiratory Rate: 20 bpm SpO2: 98%           Temperature: 36.3 (C) / 97.4 (F) We

ight: 213 

lbs 

 

                2018      Blood Pressure 1: 142/60 Code: 8480-6 BMI: 38.2 

Code: 28271-1 Heart 

Rate 1: 48 bpm  Height: 5'2"    Respiratory Rate: 20 bpm SpO2: 98%       Tempera

ture: 36.7

(C) / 98.1 (F)                          Weight: 212 lbs 

 

                2018      Blood Pressure 1: 142/64 Code: 8480-6 BMI: 38.5 

Code: 14485-1 Heart 

Rate 1: 52 bpm  Height: 5'2"    Respiratory Rate: 22 bpm SpO2: 96%       Tempera

ture: 36.1

(C) / 96.9 (F)                          Weight: 214 lbs 

 

                10/02/2018      Blood Pressure 1: 124/80 Code: 8480-6 BMI: 38.3 

Code: 38359-0 Heart 

Rate 1: 68 bpm      Height: 5'2"        Respiratory Rate: 20 bpm Temperature: 36

.3 (C) / 

97.4 (F)                                Weight: 213 lbs 

 

                2018      Blood Pressure 1: 132/78 Code: 8480-6 BMI: 37.6 

Code: 73662-0 Heart 

Rate 1: 68 bpm  Height: 5'2"    Respiratory Rate: 20 bpm SpO2: 97%       Tempera

ture: 36.8

(C) / 98.2 (F)                          Weight: 209 lbs 

 

                2018      Blood Pressure 1: 150/76 Code: 8480-6 BMI: 38.5 

Code: 95467-0 Heart 

Rate 1: 64 bpm  Height: 5'2"    Respiratory Rate: 20 bpm SpO2: 97%       Tempera

ture: 36.2

(C) / 97.2 (F)                          Weight: 214 lbs 

 

                2018      Blood Pressure 1: 122/74 Code: 8480-6 BMI: 38.2 

Code: 44957-4 Heart 

Rate 1: 64 bpm  Height: 5'2"    Respiratory Rate: 18 bpm SpO2: 96%       Tempera

ture: 35.8

(C) / 96.4 (F)                          Weight: 212 lbs 

 

                2018      Blood Pressure 1: 124/78 Code: 8480-6 BMI: 37.8 

Code: 40545-6 Heart 

Rate 1: 76 bpm      Height: 5'2"        Respiratory Rate: 20 bpm Temperature: 36

.8 (C) / 

98.3 (F)                                Weight: 210 lbs 

 

                2018      Blood Pressure 1: 136/70 Code: 8480-6 BMI: 38.0 

Code: 04840-0 Heart 

Rate 1: 68 bpm  Height: 5'2"    Respiratory Rate: 20 bpm SpO2: 97%       Tempera

ture: 36.8

(C) / 98.2 (F)                          Weight: 211 lbs 

 

                2018      Blood Pressure 1: 140/65 Code: 8480-6 Heart Rate

 1: 75 bpm 

Respiratory Rate: 24 bpm SpO2: 95%           Temperature: 37.0 (C) / 98.6 (F) We

ight: 211 

lbs 

 

                2018      Blood Pressure 1: 154/70 Code: 8480-6 BMI: 37.6 

Code: 88262-3 Heart 

Rate 1: 76 bpm  Height: 5'2"    Respiratory Rate: 20 bpm SpO2: 98%       Tempera

ture: 36.9

(C) / 98.5 (F)                          Weight: 209 lbs 

 

                2017      Blood Pressure 1: 156/70 Code: 8480-6 BMI: 37.1 

Code: 58756-8 Heart 

Rate 1: 72 bpm  Height: 5'2"    Respiratory Rate: 20 bpm SpO2: 97%       Tempera

ture: 37.0

(C) / 98.6 (F)                          Weight: 206 lbs 

 

                2017      Blood Pressure 1: 152/78 Code: 8480-6 BMI: 36.8 

Code: 92793-6 Heart 

Rate 1: 78 bpm  Height: 5'2"    Respiratory Rate: 20 bpm SpO2: 98%       Tempera

ture: 36.1

(C) / 97.0 (F)                          Weight: 204 lbs 

 

                2017      Blood Pressure 1: 142/70 Code: 8480-6 BMI: 36.9 

Code: 34645-4 Heart 

Rate 1: 64 bpm  Height: 5'2"    Respiratory Rate: 20 bpm SpO2: 96%       Tempera

ture: 36.5

(C) / 97.7 (F)                          Weight: 205 lbs 

 

                2017      Blood Pressure 1: 142/68 Code: 8480-6 Heart Rate

 1: 88 bpm 

Respiratory Rate: 18 bpm SpO2: 98%           Temperature: 36.3 (C) / 97.3 (F) We

ight: 209 

lbs 

 

                2016      Blood Pressure 1: 130/78 Code: 8480-6 Heart Rate

 1: 68 bpm 

Respiratory Rate: 20 bpm SpO2: 95%           Temperature: 36.4 (C) / 97.6 (F) We

ight: 212 

lbs 

 

                2016      Blood Pressure 1: 122/64 Code: 8480-6 BMI: 39.1 

Code: 80015-2 Heart 

Rate 1: 76 bpm      Height: 5'2"        Respiratory Rate: 20 bpm Temperature: 36

.8 (C) / 

98.2 (F)                                Weight: 217 lbs 

 

                2016      Blood Pressure 1: 144/70 Code: 8480-6 BMI: 39.4 

Code: 41868-7 Heart 

Rate 1: 76 bpm      Height: 5'2"        Respiratory Rate: 20 bpm Temperature: 36

.6 (C) / 

97.9 (F)                                Weight: 219 lbs 

 

                2015      Blood Pressure 1: 152/60 Code: 8480-6 BMI: 39.6 

Code: 88541-7 Heart 

Rate 1: 84 bpm      Height: 5'2"        Respiratory Rate: 20 bpm Temperature: 37

.0 (C) / 

98.6 (F)                                Weight: 220 lbs 

 

                2015      Blood Pressure 1: 146/76 Code: 8480-6 BMI: 39.8 

Code: 12361-6 Heart 

Rate 1: 88 bpm      Height: 5'2"        Respiratory Rate: 20 bpm Temperature: 37

.0 (C) / 

98.6 (F)                                Weight: 221 lbs 

 

                2015      Blood Pressure 1: 132/70 Code: 8480-6 BMI: 39.1 

Code: 00620-0 Heart 

Rate 1: 88 bpm      Height: 5'2"        Respiratory Rate: 20 bpm Temperature: 36

.4 (C) / 

97.6 (F)                                Weight: 217 lbs 

 

                2015      Blood Pressure 1: 132/66 Code: 8480-6 BMI: 39.9 

Code: 50070-1 Heart 

Rate 1: 72 bpm      Height: 5'2"        Respiratory Rate: 20 bpm Temperature: 36

.9 (C) / 

98.4 (F)                                Weight: 218 lbs 

 

                2015      Blood Pressure 1: 136/80 Code: 8480-6 Heart Rate

 1: 76 bpm 

Respiratory Rate: 20 bpm  Temperature: 36.7 (C) / 98.0 (F) Weight: 224 lbs 

 

                2015      Blood Pressure 1: 134/78 Code: 8480-6 BMI: 39.7 

Code: 06969-2 Heart 

Rate 1: 84 bpm      Height: 5'2"        Respiratory Rate: 20 bpm Temperature: 36

.7 (C) / 

98.0 (F)                                Weight: 217 lbs 

 

                2014      Blood Pressure 1: 146/78 Code: 8480-6 BMI: 39.5 

Code: 21361-2 Heart 

Rate 1: 82 bpm      Height: 5'2"        Respiratory Rate: 18 bpm Temperature: 35

.6 (C) / 

96.1 (F)                                Weight: 216 lbs 

 

                2014      Blood Pressure 1: 134/70 Code: 8480-6 BMI: 37.9 

Code: 56463-9 Heart 

Rate 1: 80 bpm      Height: 5'3"        Respiratory Rate: 20 bpm Temperature: 36

.8 (C) / 

98.2 (F)                                Weight: 214 lbs 

 

                2014      Blood Pressure 1: 132/70 Code: 8480-6 BMI: 37.6 

Code: 38813-0 Heart 

Rate 1: 80 bpm      Height: 5'3"        Respiratory Rate: 20 bpm Temperature: 36

.8 (C) / 

98.3 (F)                                Weight: 212 lbs 

 

                2014      Blood Pressure 1: 116/74 Code: 8480-6 Heart Rate

 1: 68 bpm 

Respiratory Rate: 20 bpm  Temperature: 36.2 (C) / 97.1 (F) Weight: 212 lbs 

 

                10/15/2013      Blood Pressure 1: 132/82 Code: 8480-6 BMI: 37.4 

Code: 64488-9 Heart 

Rate 1: 76 bpm      Height: 5'3"        Respiratory Rate: 20 bpm Temperature: 36

.7 (C) / 

98.0 (F)                                Weight: 211 lbs 

 

                    2013          Blood Pressure 1: 130/76 Code: 8480-6 He

art Rate 1: 78 bpm

 

                2013      Blood Pressure 1: 140/82 Code: 8480-6 BMI: 36.8 

Code: 10109-6 Heart 

Rate 1: 66 bpm      Height: 5'3"        Respiratory Rate: 20 bpm Temperature: 36

.1 (C) / 

96.9 (F)                                Weight: 208 lbs 

 

                2013      Blood Pressure 1: 138/80 Code: 8480-6 BMI: 36.4 

Code: 09121-0 Heart 

Rate 1: 72 bpm      Height: 5'4"        Respiratory Rate: 20 bpm Temperature: 36

.7 (C) / 

98.0 (F)                                Weight: 212 lbs 

 

                2013      Blood Pressure 1: 124/70 Code: 8480-6 BMI: 36.9 

Code: 27172-9 Heart 

Rate 1: 60 bpm      Height: 5'4"        Temperature: 36.1 (C) / 97.0 (F) Weight:

 215 lbs 

 

                05/10/2013      Blood Pressure 1: 132/86 Code: 8480-6 BMI: 36.9 

Code: 73342-6 Heart 

Rate 1: 76 bpm      Height: 5'4"        Respiratory Rate: 20 bpm Temperature: 36

.8 (C) / 

98.2 (F)                                Weight: 215 lbs 

 

                2013      Blood Pressure 1: 134/82 Code: 8480-6 BMI: 36.6 

Code: 20318-4 Heart 

Rate 1: 72 bpm      Height: 5'4"        Respiratory Rate: 20 bpm Temperature: 36

.3 (C) / 

97.4 (F)                                Weight: 213 lbs 

 

                2012      Blood Pressure 1: 142/80 Code: 8480-6 BMI: 37.1 

Code: 79359-5 Heart 

Rate 1: 76 bpm      Height: 5'4"        Respiratory Rate: 20 bpm Temperature: 36

.8 (C) / 

98.3 (F)                                Weight: 216 lbs 

 

                10/23/2012      Blood Pressure 1: 128/68 Code: 8480-6 BMI: 37.6 

Code: 40478-5 Heart 

Rate 1: 72 bpm      Height: 5'4"        Respiratory Rate: 20 bpm Temperature: 36

.6 (C) / 

97.9 (F)                                Weight: 219 lbs 

 

                10/10/2012      Blood Pressure 1: 122/70 Code: 8480-6 Heart Rate

 1: 76 bpm 

Respiratory Rate: 20 bpm  Temperature: 36.7 (C) / 98.1 (F) Weight: 218 lbs 

 

                    2012          Blood Pressure 1: 132/80 Code: 8480-6 He

art Rate 1: 84 bpm

 

                2012      Blood Pressure 1: 128/78 Code: 8480-6 BMI: 38.1 

Code: 99489-0 Heart 

Rate 1: 84 bpm      Height: 5'4"        Respiratory Rate: 20 bpm Temperature: 36

.9 (C) / 

98.4 (F)                                Weight: 222 lbs 

 

                2012      Blood Pressure 1: 138/80 Code: 8480-6 BMI: 37.9 

Code: 26029-5 Heart 

Rate 1: 74 bpm      Height: 5'4"        Temperature: 36.1 (C) / 97.0 (F) Weight:

 221 lbs 

 

                2012      Blood Pressure 1: 126/78 Code: 8480-6 BMI: 38.4 

Code: 15942-2 Heart 

Rate 1: 72 bpm      Height: 5'4"        Respiratory Rate: 20 bpm Temperature: 36

.7 (C) / 

98.0 (F)                                Weight: 224 lbs 

 

                2012      Blood Pressure 1: 138/72 Code: 8480-6 BMI: 38.4 

Code: 05528-4 Heart 

Rate 1: 72 bpm      Height: 5'4"        Respiratory Rate: 20 bpm Temperature: 36

.6 (C) / 

97.9 (F)                                Weight: 224 lbs 

 

                2012      Blood Pressure 1: 122/78 Code: 8480-6 BMI: 38.8 

Code: 36422-7 Heart 

Rate 1: 88 bpm      Height: 5'4"        Respiratory Rate: 20 bpm Temperature: 36

.6 (C) / 

97.8 (F)                                Weight: 226 lbs 

 

                2012      Blood Pressure 1: 116/60 Code: 8480-6 BMI: 38.1 

Code: 06488-9 Heart 

Rate 1: 92 bpm      Height: 5'4"        Respiratory Rate: 20 bpm Temperature: 36

.8 (C) / 

98.2 (F)                                Weight: 222 lbs 

 

                2011      Blood Pressure 1: 118/62 Code: 8480-6 BMI: 37.9 

Code: 79773-3 Heart 

Rate 1: 80 bpm      Height: 5'4"        Temperature: 36.5 (C) / 97.7 (F) Weight:

 221 lbs 

 

                2011      Blood Pressure 1: 132/70 Code: 8480-6 Heart Rate

 1: 84 bpm 

Respiratory Rate: 20 bpm  Temperature: 36.7 (C) / 98.0 (F) Weight: 221 lbs 

 

                2011      Blood Pressure 1: 114/72 Code: 8480-6 BMI: 37.6 

Code: 19426-2 Heart 

Rate 1: 76 bpm      Height: 5'4"        Respiratory Rate: 20 bpm Temperature: 36

.8 (C) / 

98.3 (F)                                Weight: 219 lbs 

 

                    2011          Blood Pressure 1: 136/76 Code: 8480-6 Te

mperature: 36.0 (C) / 96.8 

(F)                                     Weight: 219 lbs 8 oz

 

                2011      Blood Pressure 1: 128/80 Code: 8480-6 Heart Rate

 1: 72 bpm 

Temperature: 36.3 (C) / 97.4 (F)        Weight: 218 lbs 

 

                2011      Blood Pressure 1: 126/70 Code: 8480-6 Heart Rate

 1: 72 bpm 

Temperature: 36.7 (C) / 98.0 (F)

 

                    2010          Blood Pressure 1: 126/78 Code: 8480-6 Te

mperature: 36.2 (C) / 97.1 

(F)                                     Weight: 217 lbs 8 oz

 

                2010      Blood Pressure 1: 116/70 Code: 8480-6 Heart Rate

 1: 72 bpm 

Temperature: 36.4 (C) / 97.6 (F)        Weight: 222 lbs 

 

                2010      Blood Pressure 1: 114/78 Code: 8480-6 Heart Rate

 1: 72 bpm 

Temperature: 37.1 (C) / 98.8 (F)        Weight: 225 lbs 

 

                2010      Blood Pressure 1: 128/78 Code: 8480-6 Heart Rate

 1: 76 bpm 

Temperature: 36.6 (C) / 97.8 (F)        Weight: 224 lbs 

 

                2010      Blood Pressure 1: 116/78 Code: 8480-6 Heart Rate

 1: 80 bpm 

Temperature: 36.4 (C) / 97.6 (F)        Weight: 226 lbs 

 

                2010      Blood Pressure 1: 120/70 Code: 8480-6 BMI: 38.3 

Code: 96608-0 Heart 

Rate 1: 88 bpm      Height: 5'5"        Temperature: 36.4 (C) / 97.6 (F) Weight:

 230 lbs 







Functional Status

No Functional Status data



Reason For Visit





                    Reason For Visit    Effective Dates     Notes

 

                    blood pressure check 2020           

 

                    high blood pressure 2020           

 

                    lab draw            2019           

 

                    lab draw            10/11/2019           

 

                    hearing loss        2019          left ear

 

                    follow up           06/10/2019           

 

                    follow up           2019          Patient has upcoming

 appointment with Dr Claire and carotid 

dopplers tomorrow

 

                    follow up           2018          Patient had heart ca

th on 10-30-18 and one of the bypass 

veins in spasming

 

                    follow up           2018          4 Week

 

                    follow up           10/02/2018           

 

                    follow up           2018           

 

                    high blood pressure 2018          Dr Claire has ordered

 holter monitor and 

hydralazine 50mg PRN

 

                    dizziness           2018          On  morning darren delvalle woke up and went to the bathroom 

and after lying down became very dizzy. Patient has hx of vertigo so didn't 
think anything of it. She usually just has them intermittently, but had more 
that day. While at [employer] began to feel very ill and became very shaky. She got 
home and sat in the recliner and had the jittery/nervous feeling. Later that 
night it finally resolved. Patient feels like she had a spell like this around 
the  and thought it was due to extreme heat exhaustion.

 

                    follow up           2018           

 

                    back pain           2018           

 

                    otalgia             2018           

 

                    back pain           2018           

 

                    injection(s)        10/06/2017          flu shot

 

                    lab draw            2017           

 

                    follow up           2017           

 

                    pelvic pain         2017          Patient states pain 

started in May with a "Constant Ache"

to left inguinal area. Patient states at that time pain was intermittent. Then, 
approximately 2nd week  of  pain worsened and radiates to left low back 
area.

 

                    lab draw            2017           

 

                    hyperglycemia       2017           

 

                    cough               2017           

 

                    otalgia             2016           

 

                    injection(s)        10/07/2016          Influenza

 

                    lab draw            2016           

 

                    constipation        2016           

 

                    flank pain          2016           

 

                    follow up           2015          3mo fwup

 

                    injection(s)        10/16/2015          Influenza

 

                    acute renal failure 09/15/2015           

 

                    lab draw            09/10/2015           

 

                    fatigue             2015           

 

                    otalgia             2015           

 

                    pain, limb          2015           

 

                    follow up           2015          [facility] fwup

 

                    high blood pressure 2015           

 

                    injection(s)        10/17/2014          flu shot

 

                    sinusitis           2014           

 

                    high blood pressure 2014           

 

                    cough               2014          Was panfilo at Dr Tyree teixeira's office so he started he on amlodipine 

10mg but seems to be dragging her down. Patient decreased to 1/2 tablet this 
last week

 

                    lab draw            2014           

 

                    cough               2014           

 

                    cough               10/15/2013           

 

                    high blood pressure 2013           

 

                    follow up           2013          ER

 

                    dizziness           2013           

 

                    follow up           2013           

 

                    otalgia             05/10/2013           

 

                    breast complaint    2013           

 

                    lab draw            2012           

 

                    follow up           2012          2mo fwup

 

                    follow up           10/23/2012          2wk fwup

 

                    gas and bloating    10/10/2012          Dr Claire has her mon

itoring because was high in his 

office at last visit

 

                    injection(s)        2012           

 

                    dizziness           2012           

 

                    dizziness           2012           

 

                    lab draw            2012           

 

                    muscle weakness     2012          concerns of simvasta

tin with fatigue and muscle 

weakness

 

                    leg pain/sciatica   2012           

 

                    hip pain            2012           

 

                    back pain           2012          has tried ice pack, 

muscle relaxers, and moist heat

 

                    back pain           2012           

 

                    fatigue             2012          in hands/feet

 

                    otalgia             2011           

 

                    follow up           2011          2wk fwup

 

                    back pain           2011          thinks ulcer may hav

e returned

 

                    lab draw            2011           

 

                    dizziness           2011          Left ear and facial 

pain, right ear pain 

 

                    follow up           2011          1wk fwup

 

                    hip pain            2011          feels very draggy, f

atigue, BP 80/63, normally running 

100's/60's

 

                    chills              2010           

 

                    injection(s)        10/06/2010          flu shot

 

                    follow up           2010          6wk fwup, had HH rep

aired and is starting to feel better, 

started on reglan 10mg tid

 

                    follow up           2010          hosp fwup

 

                    follow up           2010          1mo fwup, saw Dr Danna du and hasn't gave cardiac clearance 

yet for HH repair, did have chemical stress test yesterday and waiting on 
results

 

                    follow up           2010          from EGD/colonoscopy

--has Hiatal Hernia and wants to do 

repair

 

                    follow up           2010           







Encounters





             Encounter    Performer    Location     Codes        Date

 

                                        ([id number]) OFFICE/OUTPATIENT VISIT EST

Diagnosis: Essential hypertension[ICD10: I10]

Diagnosis: Palpitations[ICD10: R00.2]

Diagnosis: Stress reaction[ICD10: F43.0] Akanksha DRIVER Szl            CPT-4: 76980              2020

 

                                        ([id number]) NURSE/OUTPATIENT VISIT EST

Diagnosis: Mixed hyperlipidemia[ICD10: E78.2] Akanksha DRIVER Szl            CPT-4: 80733              2019

 

                                        (80649) NURSE/OUTPATIENT VISIT EST

Diagnosis: FLU VACCINE[ICD10: Z23] Akanksha KEY Szl                       CPT-4: 88367              10/22/2019

 

                                        (57924) NURSE/OUTPATIENT VISIT EST

Diagnosis: Chronic kidney disease, stage 1[ICD10: N18.1] Akanksha DRIVER [facility] CPT-4: 95160              10/11/2019

 

                                        (71076) OFFICE/OUTPATIENT VISIT EST

Diagnosis: Acute serous otitis media, left ear[ICD10: H65.02] Nat DRIVER Szl CPT-4: 28758              2019

 

                                        ([id number]) OFFICE/OUTPATIENT VISIT EST

Diagnosis: Essential (primary) hypertension[ICD10: I10]

Diagnosis: Coronary atherosclerosis due to calcified coronary lesion[ICD10: 
I25.84]

Diagnosis: Hypoglycemia, unspecified[ICD10: E16.2]

Diagnosis: Other fatigue[ICD10: R53.83]

Diagnosis: Urinary tract infection, site not specified[ICD10: N39.0] Akanksha DRIVER [facility]l CPT-4: 43032        06/10/2019

 

                                        (86162) OFFICE/OUTPATIENT VISIT EST

Diagnosis: Essential (primary) hypertension[ICD10: I10]

Diagnosis: Gastro-esophageal reflux disease without esophagitis[ICD10: K21.9]

Diagnosis: Urinary tract infection, site not specified[ICD10: N39.0] Akanksha DRIVER DO [facility] CPT-4: 90547        2019

 

                                        ([id number]) OFFICE/OUTPATIENT VISIT EST

Diagnosis: Gastro-esophageal reflux disease without esophagitis[ICD10: K21.9]

Diagnosis: Epigastric pain[ICD10: R10.13] [employer]            CPT-4: 96342              2018

 

                                        (72911) OFFICE/OUTPATIENT VISIT EST

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

Diagnosis: Essential (primary) hypertension[ICD10: I10]

Diagnosis: Generalized anxiety disorder[ICD10: F41.1]

Diagnosis: Gastro-esophageal reflux disease without esophagitis[ICD10: K21.9] 

Akanksha DRIVER DO [facility] CPT-4: 15978        2018

 

                                        OFFICE/OUTPATIENT VISIT EST

Diagnosis: Gastro-esophageal reflux disease without esophagitis[ICD10: K21.9]

Diagnosis: Palpitations[ICD10: R00.2]

Diagnosis: Urinary tract infection, site not specified[ICD10: N39.0]

Diagnosis: Generalized anxiety disorder[ICD10: F41.1] Akanksha DRIVER [employer] [facility] CPT-4: 48976              10/02/2018

 

                                        (80542) NURSE/OUTPATIENT VISIT EST

Diagnosis: Coronary atherosclerosis due to calcified coronary lesion[ICD10: 
I25.84]

Diagnosis: Hypoglycemia, unspecified[ICD10: E16.2]

Diagnosis: Dizziness and giddiness[ICD10: R42]

Diagnosis: Occlusion and stenosis of bilateral carotid arteries[ICD10: I65.23] 

Akanksha DRIVER DO [facility] CPT-4: 43193        2018

 

                                        OFFICE/OUTPATIENT VISIT EST

Diagnosis: Epigastric pain[ICD10: R10.13]

Diagnosis: Generalized anxiety disorder[ICD10: F41.1]

Diagnosis: FLU VACCINE[ICD10: Z23] Akanksha KEY [employer] 

[facility]                       CPT-4: 22078              2018

 

                                        (23879) OFFICE/OUTPATIENT VISIT EST

Diagnosis: Essential (primary) hypertension[ICD10: I10]

Diagnosis: Hypoglycemia, unspecified[ICD10: E16.2]

Diagnosis: Gastro-esophageal reflux disease without esophagitis[ICD10: K21.9] 

Akanksha Xiangrodo BAUMAN OLIVIA DRIVER DO [facility] CPT-4: 46012        2018

 

                                        ([id number]) OFFICE/OUTPATIENT VISIT EST

Diagnosis: Dizziness and giddiness[ICD10: R42] Nat DRIVER DO [facility]            CPT-4: 10130              2018

 

                                        (48092) OFFICE/OUTPATIENT VISIT EST

Diagnosis: Cervicalgia[ICD10: M54.2]

Diagnosis: Other spondylosis with radiculopathy, cervical region[ICD10: M47.22] 

Akanksha Xiangrodo BAUMAN SAramis KRISTEL CASE [facility] CPT-4: 24374        2018

 

                                        (23879) OFFICE/OUTPATIENT VISIT EST

Diagnosis: Hypoglycemia, unspecified[ICD10: E16.2]

Diagnosis: Other spondylosis with radiculopathy, cervical region[ICD10: M47.22]

Diagnosis: Vertigo of central origin, bilateral[ICD10: H81.43]

Diagnosis: Cervicocranial syndrome[ICD10: M53.0] Akanksha Xiangndangela        DORITAANN MARIE SEENE 

SAramis KRISTEL [employer] [facility]         CPT-4: 66380              2018

 

                                        ([id number]) OFFICE/OUTPATIENT VISIT EST

Diagnosis: Benign paroxysmal vertigo, bilateral[ICD10: H81.13]

Diagnosis: Otalgia, bilateral[ICD10: H92.03] Nat DRIVER [employer] [facility]            CPT-4: 12484              2018

 

                                        (18353) OFFICE/OUTPATIENT VISIT EST

Diagnosis: Low back pain[ICD10: M54.5]

Diagnosis: Radiculopathy, lumbosacral region[ICD10: M54.17]

Diagnosis: Left lower quadrant pain[ICD10: R10.32] Akanksha BAKER KRISTEL [employer] [facility]         CPT-4: 51412              2018

 

                                        ([id number]) OFFICE/OUTPATIENT VISIT EST

Diagnosis: FLU VACCINE[ICD10: Z23] Akanksha BAUMAN SAramis OREND

[facility]                       CPT-4: 71913              10/06/2017

 

                                        (52041) OFFICE/OUTPATIENT VISIT EST

Diagnosis: Mixed hyperlipidemia[ICD10: E78.2]

Diagnosis: Essential (primary) hypertension[ICD10: I10]

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

Diagnosis: Impaired fasting glucose[ICD10: R73.01]

Diagnosis: Other fatigue[ICD10: R53.83] [facility]                    CPT-[facility]: 28680              2017

 

                                        ([id number]) OFFICE/OUTPATIENT VISIT EST

Diagnosis: Pain in thoracic spine[ICD10: M54.6]

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 

[facility]: 35399        2017

 

                                        ([id number]) OFFICE/OUTPATIENT VISIT EST

Diagnosis: Left lower quadrant pain[ICD10: R10.32]

Diagnosis: Low back pain[ICD10: M54.5] Akanksha SYKES 

[facility]                    CPT-4: 36123              2017

 

                                        ([id number]) OFFICE/OUTPATIENT VISIT EST

Diagnosis: Mixed hyperlipidemia[ICD10: E78.2]

Diagnosis: Essential (primary) hypertension[ICD10: I10]

Diagnosis: Hypoglycemia, unspecified[ICD10: E16.2] [facility]         CPT-4: 76854              2017

 

                                        ([id number]) OFFICE/OUTPATIENT VISIT EST

Diagnosis: Impaired fasting glucose[ICD10: R73.01]

Diagnosis: Mastodynia[ICD10: N64.4]

Diagnosis: Mixed hyperlipidemia[ICD10: E78.2]

Diagnosis: Essential (primary) hypertension[ICD10: I10]

Diagnosis: Other fatigue[ICD10: R53.83] [facility]                    CPT-4: 09262              2017

 

                                        (24042) OFFICE/OUTPATIENT VISIT EST

Diagnosis: Acute upper respiratory infection, unspecified[ICD10: J06.9] Luba Stevenson              [facility] CPT-[facility]: 08201        2017

 

                                        (81672) OFFICE/OUTPATIENT VISIT EST

Diagnosis: Acute mastoiditis without complications, right ear[ICD10: H70.001] 

Akanksha DRIVER DO [facility] CPT-4: 34461        2016

 

                                        ([id number]) OFFICE/OUTPATIENT VISIT EST

Diagnosis: FLU VACCINE[ICD10: Z23] Akanksha KEY DO 

[facility]                       CPT-4: 69637              10/07/2016

 

                                        (75158) OFFICE/OUTPATIENT VISIT EST

Diagnosis: Mixed hyperlipidemia[ICD10: E78.2]

Diagnosis: Essential (primary) hypertension[ICD10: I10]

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

Diagnosis: Impaired fasting glucose[ICD10: R73.01] Akanksha DRIVER DO [facility]         CPT-4: 30010              2016

 

                                        ([id number]) OFFICE/OUTPATIENT VISIT EST

Diagnosis: Constipation, unspecified[ICD10: K59.00] Akanksha DRIVER DO [facility] CPT-4: 66220              2016

 

                                        (44512) OFFICE/OUTPATIENT VISIT EST

Diagnosis: Essential (primary) hypertension[ICD10: I10]

Diagnosis: Mixed hyperlipidemia[ICD10: E78.2]

Diagnosis: Chronic kidney disease, stage 1[ICD10: N18.1] Akanksha DRIVER DO [facility] CPT-4: 60228              2016

 

                                        (57757) OFFICE/OUTPATIENT VISIT EST

Diagnosis: Muscle weakness (generalized)[ICD10: M62.81]

Diagnosis: Dizziness and giddiness[ICD10: R42]

Diagnosis: Essential (primary) hypertension[ICD10: I10]

Diagnosis: History of falling[ICD10: Z91.81] Akanksha Xiangndangela        NYARAMOS DRIVER DO [facility]            CPT-4: 09400              2015

 

                                        (81881) OFFICE/OUTPATIENT VISIT EST

Diagnosis: FLU VACCINE[ICD10: Z23] Akanksha HIGHTOWERLINE OLIVIA KEY DO 

[facility]                       CPT-4: 51152              10/16/2015

 

                                        ([id number]) OFFICE/OUTPATIENT VISIT EST

Diagnosis: Acute renal failure[ICD9: 584.9]

Diagnosis: POLYURIA[ICD9: 788.42] Akanksha HIGHTOWERLINE OILVIA LANCE [facility]                       CPT-4: 71339              09/15/2015

 

                                        ([id number]) OFFICE/OUTPATIENT VISIT EST

Diagnosis: HYPERLIPIDEMIA NEC/NOS[ICD9: 272.4]

Diagnosis: HYPERTENSION[ICD9: 401.9]

Diagnosis: CAD[ICD9: 414.00]

Diagnosis: Hyperglycemia[ICD9: 790.29]

Diagnosis: MALAISE AND FATIGUE[ICD9: 780.79] Akanksha Otoolendangela KEVINQUELIN

SHERRY DRIVER [employer] [facility]            CPT-4: 60994              09/10/2015

 

                                        (84992) OFFICE/OUTPATIENT VISIT EST

Diagnosis: MALAISE AND FATIGUE[ICD9: 780.79]

Diagnosis: HYPOGLYCEMIA[ICD9: 251.2]

Diagnosis: Grieving[ICD9: 309.0]

Diagnosis: ABDOMINAL PAIN[ICD9: 789.00] Akanksha Xiangndangela HIGHTOWERLINE SAramis 

KRISTEL

[facility]                    CPT-4: 47304              2015

 

                                        OFFICE/OUTPATIENT VISIT EST

Diagnosis: CERUMEN IMPACTION[ICD9: 380.4]

Diagnosis: EUSTACHIAN TUBE DYSFUNCTION[ICD9: 381.81] Berthasa Mon      

AKANKSHA OLIVIA DRIVER [facility] CPT-4: 20591              2015

 

                                        (89187) OFFICE/OUTPATIENT VISIT EST

Diagnosis: Leg pain[ICD9: 729.5]

Diagnosis: SUPERFIC PHLEBITIS-LEG[ICD9: 451.0] Akanksha ROBERTSON SAramis 

KRISTEL [employer] [facility]            CPT-4: 33497              2015

 

                                        ([id number]) OFFICE/OUTPATIENT VISIT EST

Diagnosis: Thoracic back pain[ICD9: 724.1]

Diagnosis: SPASM OF MUSCLE[ICD9: 728.85] Akanksha Xiangndangela HIGHTOWERLINE SAramis

 

KRISTEL CASE [facility]            CPT-4: 29326              2015

 

                                        (07103) OFFICE/OUTPATIENT VISIT EST

Diagnosis: DIZZINESS/VERTIGO[ICD9: 780.4]

Diagnosis: Benign positional vertigo[ICD9: 386.11]

Diagnosis: HYPERTENSION[ICD9: 401.9]

Diagnosis: Suspicious nevus[ICD9: 238.2] Akanksha DRIVER [facility]            CPT-4: 87518              2015

 

                                        ([id number]) OFFICE/OUTPATIENT VISIT EST

Diagnosis: FLU VACCINE[ICD10: Z23] Akanksha KEY [facility]                       CPT-4: 14742              10/17/2014

 

                                        OFFICE/OUTPATIENT VISIT EST

Diagnosis: SINUSITIS, ACUTE[ICD9: 461.9]

Diagnosis: EUSTACHIAN TUBE DYSFUNCTION[ICD9: 381.81] Bertha DRIVER [facility] CPT-4: 18675              2014

 

                                        (68787) OFFICE/OUTPATIENT VISIT EST

Diagnosis: DIZZINESS/VERTIGO[ICD9: 780.4]

Diagnosis: HYPERTENSION[ICD9: 401.9] [facility]                       CPT-4: 02461              2014

 

                                        (67757) OFFICE/OUTPATIENT VISIT EST

Diagnosis: HYPERTENSION[ICD9: 401.9]

Diagnosis: MALAISE AND FATIGUE[ICD9: 780.79]

Diagnosis: SINUSITIS, ACUTE[ICD9: 461.9] Akanksha DRIVER [facility]            CPT-4: 78997              2014

 

                                        (42881) OFFICE/OUTPATIENT VISIT EST

Diagnosis: HYPERLIPIDEMIA NEC/NOS[ICD9: 272.4]

Diagnosis: HYPERTENSION[ICD9: 401.9]

Diagnosis: B12 DEFIC ANEMIA NEC[ICD9: 281.1]

Diagnosis: HYPOGLYCEMIA[ICD9: 251.2] Akanksha MEJIA [facility]                       CPT-4: 76017              2014

 

                                        OFFICE/OUTPATIENT VISIT EST

Diagnosis: COUGH[ICD9: 786.2] Bertha DRIVER [facility] 

CPT-4: 75068                            2014

 

                                        OFFICE/OUTPATIENT VISIT EST

Diagnosis: COUGH[ICD9: 786.2]

Diagnosis: URI, ACUTE[ICD9: 465.9] Bertha KEY [facility]                       CPT-4: 87893              10/15/2013

 

                                        (16442) OFFICE/OUTPATIENT VISIT EST

Diagnosis: HYPERTENSION[ICD9: 401.9]

Diagnosis: CEPHALGIA[ICD9: 784.0]

Diagnosis: ANXIETY STATE NOS[ICD9: 300.00]

Diagnosis: FLU VACCINE[ICD9: V04.81] Akanksha MEJIA [facility]                       CPT-4: 24700              2013

 

                                        (30550) OFFICE/OUTPATIENT VISIT EST

Diagnosis: DIZZINESS/VERTIGO[ICD9: 780.4]

Diagnosis: SINUSITIS, ACUTE[ICD9: 461.9]

Diagnosis: Benign positional vertigo[ICD9: 386.11] Akanksha IZQUIERDOMARCY

[facility]amis [facility]         CPT-4: 47424              2013

 

                                        OFFICE/OUTPATIENT VISIT EST

Diagnosis: OTITIS MEDIA NOS[ICD9: 382.9] Akanksha HIGHTOWERLINE SAramis

 

[facility]            CPT-4: 69653              2013

 

                                        OFFICE/OUTPATIENT VISIT EST

Diagnosis: Perforation of ear drum[ICD9: 384.20]

Diagnosis: SINUSITIS, ACUTE[ICD9: 461.9] Akanksha HIGHTOWERLINE SAramis

 

[facility]            CPT-4: 72933              05/10/2013

 

                                        ([id number]) OFFICE/OUTPATIENT VISIT EST

Diagnosis: Mastalgia[ICD9: 611.71] Akanksha CHEATHAMAramis TJ

[facility]                       CPT-4: 80472              2013

 

                                        (36434) OFFICE/OUTPATIENT VISIT EST

Diagnosis: HYPERLIPIDEMIA NEC/NOS[ICD9: 272.4]

Diagnosis: HYPERTENSION[ICD9: 401.9]

Diagnosis: DIZZINESS/VERTIGO[ICD9: 780.4]

Diagnosis: Hyperglycemia[ICD9: 790.29]

Diagnosis: MALAISE AND FATIGUE[ICD9: 780.79] Akanksha Xiangrodo TEIXEIRA SAramis 

[facility]            CPT-4: 65703              2012

 

                                        (23327) OFFICE/OUTPATIENT VISIT EST

Diagnosis: EUSTACHIAN TUBE DYSFUNCTION[ICD9: 381.81]

Diagnosis: DIZZINESS/VERTIGO[ICD9: 780.4]

Diagnosis: ABDOMINAL PAIN[ICD9: 789.00]

Diagnosis: GERD[ICD9: 530.81]

Diagnosis: CERUMEN IMPACTION[ICD9: 380.4] [facility]            CPT-4: 75780              2012

 

                                        OFFICE/OUTPATIENT VISIT EST

Diagnosis: ABDOMINAL PAIN[ICD9: 789.00]

Diagnosis: DYSPEPSIA[ICD9: 536.8] Akanksha LANCE [facility]                       CPT-4: 24428              10/23/2012

 

                                        ([id number]) OFFICE/OUTPATIENT VISIT EST

Diagnosis: ABDOMINAL PAIN[ICD9: 789.00]

Diagnosis: DYSPEPSIA[ICD9: 536.8]

Diagnosis: IBS[ICD9: 564.1] Akanksha DRIVER [facility] CPT

-

4: 40680                                10/10/2012

 

                                        OFFICE/OUTPATIENT VISIT EST

Diagnosis: DIZZINESS/VERTIGO[ICD9: 780.4]

Diagnosis: HYPOGLYCEMIA[ICD9: 251.2] Akanksha MEJIA [facility]                       CPT-4: 49818              2012

 

                                        (32881) OFFICE/OUTPATIENT VISIT EST

Diagnosis: URINARY TRACT INFECTION[ICD9: 599.0] Akanksha DRIVER [facility]            CPT-4: 74306              2012

 

                                        (91039) OFFICE/OUTPATIENT VISIT EST

Diagnosis: HYPERLIPIDEMIA NEC/NOS[ICD9: 272.4]

Diagnosis: HYPERTENSION[ICD9: 401.9]

Diagnosis: MALAISE AND FATIGUE[ICD9: 780.79]

Diagnosis: DIZZINESS/VERTIGO[ICD9: 780.4] [facility]ha [facility]            CPT-4: 52875              2012

 

                                        (31131) OFFICE/OUTPATIENT VISIT EST

Diagnosis: DIZZINESS/VERTIGO[ICD9: 780.4]

Diagnosis: MALAISE AND FATIGUE[ICD9: 780.79]

Diagnosis: HYPERTENSION[ICD9: 401.9] Akanksha MEJIA [facility]                       CPT-4: 29736              2012

 

                                        OFFICE/OUTPATIENT VISIT EST

Diagnosis: LUMB/LUMBOSAC DISC DEGEN[ICD9: 722.52]

Diagnosis: Radiculopathy of leg[ICD9: 724.4]

Diagnosis: SACROILIITIS NEC[ICD9: 720.2]

Diagnosis: SPINAL ENTHESOPATHY[ICD9: 720.1] Akanksha DRIVER [facility]            CPT-4: 04768              2012

 

                                        (37176) OFFICE/OUTPATIENT VISIT EST

Diagnosis: PAIN, LOWER BACK[ICD9: 724.2]

Diagnosis: SPASM OF MUSCLE[ICD9: 728.85]

Diagnosis: SCIATICA[ICD9: 724.3]

Diagnosis: Lumbar degenerative disc disease[ICD9: 722.52] Akanksha DRIVER [facility] CPT-4: 56950              2012

 

                                        (59671) OFFICE/OUTPATIENT VISIT EST

Diagnosis: PAIN IN THORACIC SPINE[ICD9: 724.1]

Diagnosis: SPASM OF MUSCLE[ICD9: 728.85] Akanksha DRIVER [facility]            CPT-4: 81917              2012

 

                                        (01844) OFFICE/OUTPATIENT VISIT EST

Diagnosis: PAIN, LOWER BACK[ICD9: 724.2]

Diagnosis: SPASM OF MUSCLE[ICD9: 728.85]

Diagnosis: Sacroiliac dysfunction[ICD9: 739.4]

Diagnosis: Lumbar degenerative disc disease[ICD9: 722.52] Akanksha DRIVER [facility] CPT-4: 80030              2012

 

                                        OFFICE/OUTPATIENT VISIT EST

Diagnosis: MALAISE AND FATIGUE[ICD9: 780.79]

Diagnosis: HYPOTENSION[ICD9: 458.9]

Diagnosis: CAD[ICD9: 414.00] Akanksha DRIVER [facility] 

CPT-4: 08130                            2012

 

                                        OFFICE/OUTPATIENT VISIT EST

Diagnosis: SINUSITIS, ACUTE[ICD9: 461.9]

Diagnosis: PHARYNGITIS, ACUTE[ICD9: 462]

Diagnosis: CERUMEN IMPACTION[ICD9: 380.4] [facility]ha [facility]            CPT-4: 77527              2011

 

                                        OFFICE/OUTPATIENT VISIT EST

Diagnosis: PAIN IN THORACIC SPINE[ICD9: 724.1]

Diagnosis: GERD[ICD9: 530.81]

Diagnosis: DIZZINESS/VERTIGO[ICD9: 780.4] Akanksha BAUMAN S

. 

[facility]            CPT-4: 99710              2011

 

                OFFICE/OUTPATIENT VISIT EST Akanksha OTOOLE

[facility] CPT-

4: 06509                                2011

 

                    (53921) OFFICE/OUTPATIENT VISIT EST Akanksha CASTILLO SAramis [facility]                       CPT-4: 19216              2011

 

                                        (14505) OFFICE/OUTPATIENT VISIT, EST

Diagnosis: PAIN, LOWER BACK[ICD9: 724.2] Akanksha BAUMAN SAramis

 

[facility]            CPT-4: 48828              2011

 

                    (00739) OFFICE/OUTPATIENT VISIT, EST Akanksha DE LA ROSA S. [facility]

[facility]                       CPT-4: 21271              2011

 

                    (78367) OFFICE/OUTPATIENT VISIT, EST Akanksha DE LA ROSA S. [facility]

[facility]                       CPT-4: 94771              2010

 

                    (24154) OFFICE/OUTPATIENT VISIT, EST Akanksha DE LA ROSA S. [facility]

[facility]                       CPT-4: 23559              2010

 

                    ([id number]) OFFICE/OUTPATIENT VISIT, EST Akanksha DE LA ROSA S. [facility]

[facility]                       CPT-4: 83064              2010

 

                    (39052) OFFICE/OUTPATIENT VISIT, EST Akanksha DE LA ROSA S. [facility]

[facility]                       CPT-4: 71551              2010

 

                    (72703) OFFICE/OUTPATIENT VISIT, EST Akanksha DE LA ROSA S. [facility]

[facility]                       CPT-4: 87703              2010

 

                    (19634) OFFICE/OUTPATIENT VISIT, EST Akanksha DE LA ROSA S. [facility]

[facility]                       CPT-4: 72850              2010







Plan of Care





             Planned Activity Notes        Codes        Status       Date

 

                          Visit Diagnosis Plan: Palpitations Discussion: Check C

BC, CMP, TSH

                                        ICD-9 : 785.1

ICD-10 : R00.2

                                                    2020

 

                          Visit Diagnosis Plan: Essential hypertension Discussio

n: Increase metoprolol to 

25mg q AM and 50mg po q pM Recheck 1 week

                                        ICD-9 : 401.9

ICD-10 : I10

                                                    2020

 

                          Patient Education: metoprolol tartrate- [facility] [id number] 

https://www.[employer]/samplemd/resources/getResource/61/[email or web address]

                                        Completed           2020

 

                    Care Plan: X-RAY EXAM NECK SPINE 4/5VWS                     

LOINC : 56792-9

                          Pending                   2020

 

                    Care Plan: X-RAY EXAM THORAC SPINE4/>VW                     

LOINC : 29042-6

                          Pending                   2020

 

                                        Appointment: Akanksha Driver

WPtel:[phone]

                                        [address] [place] [address]                                              LAB             2019

 

                                        Appointment: Akanksha Driver

WPtel:[phone]

                                        [address] [place] [address]                                              INJECTION       10/22/2019

 

             Patient Education: INFLUENZA VACCINE [facility]                           

Completed    10/22/2019

 

                                        Appointment: Akanksha Driver

WPtel:[phone]

                                        [address] [place] [address]                                              LAB             10/11/2019

 

                          Visit Diagnosis Plan: Acute serous otitis media, left 

ear Discussion: instructed

to use flonase and claritin daily for symptom relief. discussed with patient 
that if no improvement in 2 weeks, will need to follow up with ENT for audiology
exam. instructed to call office with any other symptoms such as otalgia, 
dysphagia, fever, etc.

                                        ICD-9 : 381.01

ICD-10 : H65.02

                                                    2019

 

                                        Appointment: Nat Lozoya

                                        [address] [place] [address]                                              ACUTE ILLNESS   2019

 

                          Visit Diagnosis Plan: Urinary tract infection, site no

t specified Discussion: 

Started an old abx prescription

                                        ICD-9 : 599.0

ICD-10 : N39.0

                                                    06/10/2019

 

                          Visit Diagnosis Plan: Hypoglycemia, unspecified Discus

madeleine: Monitor BS At least 

6 small meals a day each with protein

                                        ICD-9 : 251.2

ICD-10 : E16.2

                                                    06/10/2019

 

                          Visit Diagnosis Plan: Essential (primary) hypertension

 Discussion: Stable Check 

CMP

                                        ICD-9 : 401.9

ICD-10 : I10

                                                    06/10/2019

 

                          Visit Diagnosis Plan: Other fatigue Discussion: Check 

CBC, TSH

                                        ICD-9 : 780.79

ICD-10 : R53.83

                                                    06/10/2019

 

                                        Appointment: Akanksha Driver

WPtel:[phone]

                                        [address] [place], [place] [id number]

[place]                                              FOLLOW UP       06/10/2019

 

                          Visit Diagnosis Plan: Essential (primary) hypertension

 Discussion: Stable Sees 

Dr. Clements this month

                                        ICD-9 : 401.9

ICD-10 : I10

                                                    2019

 

                          Visit Diagnosis Plan: Urinary tract infection, site no

t specified Discussion: 

Macrobid 100mg po BID for 1 week

                                        ICD-9 : 599.0

ICD-10 : N39.0

                                                    2019

 

                          Visit Diagnosis Plan: Gastro-esophageal reflux disease

 without esophagitis 

Discussion: Stable on omeprazole

Follow Up: 3 months

                                        ICD-9 : 530.81

ICD-10 : K21.9

                                                    2019

 

                                        Appointment: Akanksha Driver

WPtel:[phone]

                                        [address] [place], [place] [id number]

[place]                                              FOLLOW UP       2019

 

                          Patient Education: metoprolol tartrate- [facility] [id number] 

https://www.[employer]/samplemd/resources/getResource/61/086j4p01-6jlr-96ws-19

                                        Completed           2019

 

                                        Appointment: Akanksha Driver

WPtel:[phone]

                                        [address] [place], [place] [id number]

[place]                                              CANCELED        02/15/2019

 

                          Visit Diagnosis Plan: Gastro-esophageal reflux disease

 without esophagitis 

Discussion: Continue protonix and carafate Proceed with updated EGD

                                        ICD-9 : 530.81

ICD-10 : K21.9

                                                    2018

 

                          Visit Diagnosis Plan: Epigastric pain Discussion: [facility]

k CT abdomen/pelvis due to

ongoing abdominal pain

                                        ICD-9 : 789.06

ICD-10 : R10.13

                                                    2018

 

                                        Appointment: Akanksha Driver

WPtel:[phone]

                                        [address] [place], [place] [id number]

[place]                                              FOLLOW UP       2018

 

                    Care Plan: CT PELVIS W/O DYE                     LOINC : 361

08-9

                          Pending                   2018

 

                    Care Plan: CT ABDOMEN W/O DYE                     LOINC : 36

103-0

                          Pending                   2018

 

                    Care Plan: Referral Order                     SNOMED-CT : 30

[id number]

                          Pending                   2018

 

                                        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

art disease of native coronary artery 

without angina pectoris                 Discussion: S/P cardiac cath--doing medi

vicki management 

with imdur and metoprolol increased Has fwup with cardiology next week Discussed
cardiac rehab

                                        ICD-9 : 414.00

ICD-10 : I25.10

                                                    2018

 

                          Visit Diagnosis Plan: Generalized anxiety disorder Dis

cussion: Stable

                                        ICD-9 : 300.00

ICD-10 : F41.1

                                                    2018

 

                          Visit Diagnosis Plan: Gastro-esophageal reflux disease

 without esophagitis 

Discussion: Continue protonix at BID dosing and carafate BID

Follow Up: 2 months

                                        ICD-9 : 530.81

ICD-10 : K21.9

                                                    2018

 

                          Visit Diagnosis Plan: Essential (primary) hypertension

 Discussion: Stable

                                        ICD-9 : 401.9

ICD-10 : I10

                                                    2018

 

                                        Appointment: Akanksha Driver

WPtel:[phone] [address] [id number]

                                              FOLLOW UP       2018

 

                          Visit Diagnosis Plan: Gastro-esophageal reflux disease

 without esophagitis 

Discussion: Continue protonix at BID dosing Continue carafate at q AC and HS 
dosing for 2 more weeks then decrease to BID dosing

Follow Up: 4 weeks

                                        ICD-9 : 530.81

ICD-10 : K21.9

                                                    10/02/2018

 

                          Visit Diagnosis Plan: Urinary tract infection, site no

t specified Discussion: 

Reculture urine

                                        ICD-9 : 599.0

ICD-10 : N39.0

                                                    10/02/2018

 

                          Visit Diagnosis Plan: Generalized anxiety disorder Dis

cussion: Increase xanax to

BID dosing especially with upcoming cardiac testing which is already making 
patient more anxious and worried

                                        ICD-9 : 300.00

ICD-10 : F41.1

                                                    10/02/2018

 

                          Visit Diagnosis Plan: Palpitations Discussion: Cardilo

logy going to schedule 

Lexiscan

                                        ICD-9 : 785.1

ICD-10 : R00.2

                                                    10/02/2018

 

                                        Appointment: Akanksha Driver

WPtel:[phone]

                                        [address] [place], [place] [id number]                                              FOLLOW UP       10/02/2018

 

             Patient Education: Patient Medication Summary                      

     Completed    10/02/2018

 

                                        Appointment: Akanksha Driver

WPtel:[phone]

                                        [address] [place], [place] [id number]                                              LAB             2018

 

             Patient Education: Patient Medication Summary                      

     Completed    2018

 

                          Visit Diagnosis Plan: Epigastric pain Discussion: Cont

inue protonix and carafate

but make into a slurry Flu shot given Discussed EGD if symptoms persist

Follow Up: 2 weeks

                                        ICD-9 : 789.06

ICD-10 : R10.13

                                                    2018

 

                          Visit Diagnosis Plan: Generalized anxiety disorder Dis

cussion: Did discuss 

increased risk of benzodiazepines causing dementia but at this time will stay at
xanax 0.25mg po BID

                                        ICD-9 : 300.00

ICD-10 : F41.1

                                                    2018

 

                                        Appointment: Akanksha Driver

WPtel:[phone]

                                        [address] [place] [address]                                              FOLLOW UP       2018

 

             Patient Education: Patient Medication Summary                      

     Completed    2018

 

                          Visit Diagnosis Plan: Essential (primary) hypertension

 Discussion: Has 

hydralazine to use prn per cardiology Going to do 30 day holter monitor

                                        ICD-9 : 401.9

ICD-10 : I10

                                                    2018

 

                          Visit Diagnosis Plan: Hypoglycemia, unspecified Discus

madeleine: 6 small meals a 

day--each with protein Increase fluid intake

                                        ICD-9 : 251.2

ICD-10 : E16.2

                                                    2018

 

                          Visit Diagnosis Plan: Gastro-esophageal reflux disease

 without esophagitis 

Discussion: Change to protonix 40mg po BID

Follow Up: 1 months

                                        ICD-9 : 530.81

ICD-10 : K21.9

                                                    2018

 

                                        Appointment: Akanksha Driver

WPtel:[phone]

                                        [address] [place] [address]                                              ACUTE ILLNESS   2018

 

             Patient Education: Patient Medication Summary                      

     Completed    2018

 

                          Visit Diagnosis Plan: Dizziness and giddiness Discussi

on: blood work to be 

completed in office including cmp, cbc, troponin to rule out glucose 
intolerance, infection, dehydration. instructed patient that she needs to see 
her cardiologist sooner than oct and patient requested we contact dr. clements's 
office. will have front office make appt. patient has carotid doppler annually.

                                        ICD-9 : 780.4

ICD-10 : R42

                                                    2018

 

                                        Appointment: Nat Lozoya

                                        [address]                                              ACUTE ILLNESS   2018

 

             Patient Education: Patient Medication Summary                      

     Completed    2018

 

                          Visit Diagnosis Plan: Cervicalgia Discussion: Complete

 course of PT since 

symptoms improved Continue stretching exercises Notify if symptoms return or 
worsen

                                        ICD-9 : 723.1

ICD-10 : M54.2

                                                    2018

 

                                        Appointment: Akanksha Driver

WPtel:[phone]

                                        [address] [place] [address]                                              FOLLOW UP       2018

 

             Patient Education: Patient Medication Summary                      

     Completed    2018

 

                          Visit Diagnosis Plan: Hypoglycemia, unspecified Discus

madeleine: Eat something with 

protein every 2 hrs

                                        ICD-9 : 251.2

ICD-10 : E16.2

                                                    2018

 

                          Visit Diagnosis Plan: Other spondylosis with radiculop

athy, cervical region 

Discussion: Check MRI of cervical spine

                                        ICD-9 : 721.0

ICD-10 : M47.22

                                                    2018

 

                          Visit Diagnosis Plan: Vertigo of central origin, bilat

eral Discussion: Discussed

PT for vestibular therapy

                                        ICD-9 : 386.2

ICD-10 : H81.43

                                                    2018

 

                                        Appointment: Akanksha Driver

WPtel:[phone]

                                        [facility]

                                                              2018

 

             Patient Education: Patient Medication Summary                      

     Completed    2018

 

                    Care Plan: MRI NECK SPINE W/O DYE                     LOINC 

: [id number]

                          Pending                   2018

 

                          Visit Diagnosis Plan: Otalgia, bilateral Discussion: k

enalog 40 mg given to 

patient im. instructed patient to monitor blood sugar at home due to risk of 
increased sugar. instructed patient to rtc on wednesday if no improvement and 
may require antibiotic.

                                        ICD-9 : 388.70

ICD-10 : H92.03

                                                    2018

 

                          Visit Diagnosis Plan: Benign paroxysmal vertigo, bilat

eral Discussion: patient 

has meclizine at home but states it makes her too tired. instructed patient to 
cut in half and take at night. if it doesn't cause too much drowsiness, 
instructed to take bid until feeling better. instructed to rtc wed if no 
improvement or worsening symptoms. instructed patient to increase fluid intake 
as well.

                                        ICD-9 : 386.11

ICD-10 : H81.13

                                                    2018

 

                                        Appointment: Nat Lozoya

                                        [address] [place] [address]                                              ACUTE ILLNESS   2018

 

             Patient Education: Patient Medication Summary                      

     Completed    2018

 

                          Visit Diagnosis Plan: Left lower quadrant pain Discuss

ion: Culture urine Notify 

if worsens

                                        ICD-9 : 789.04

ICD-10 : R10.32

                                                    2018

 

                          Visit Diagnosis Plan: Low back pain Discussion: Stretc

hes Topical aspercreme 

Tylenol 1 po TID

                                        ICD-9 : 724.2

ICD-10 : M54.5

                                                    2018

 

                          Visit Diagnosis Plan: Radiculopathy, lumbosacral regio

n Discussion: As above

                                        ICD-9 : 724.4

ICD-10 : M54.17

                                                    2018

 

                                        Appointment: Akanksha Driver

WPtel:[phone]

                                        [address] [place] [address]                                              ACUTE ILLNESS   2018

 

             Patient Education: Patient Medication Summary                      

     Completed    2018

 

                                        Appointment: Akanksha Driver

WPtel:[phone]

                                        [address] [place] [address]

[place]                                              INJECTION       10/06/2017

 

             Patient Education: Patient Medication Summary                      

     Completed    10/06/2017

 

                                        Appointment: Akanksha Driver

WPtel:[phone]

                                        [address] [place] [address]

[place]                                              LAB             2017

 

             Patient Education: Patient Medication Summary                      

     Completed    2017

 

                          Visit Diagnosis Plan: Pain in thoracic spine Discussio

n: PT has helped Continue 

stretches and walking [facility]ed [facility]

                                        ICD-9 : 724.1

ICD-10 : M54.6

                                                    2017

 

                          Visit Diagnosis Plan: Other intervertebral disc degene

ration, lumbar region 

Follow Up: 3 months

                                        ICD-9 : 722.52

ICD-10 : M51.36

                                                    2017

 

                                        Appointment: Akanksha Driver

WPtel:[phone]

                                        [address] [place], [place] [id number]

[place]                                              FOLLOW UP       2017

 

             Patient Education: Patient Medication Summary                      

     Completed    2017

 

                          Visit Diagnosis Plan: Low back pain Discussion: Start 

PT for low back- Seems 

like abdominal pain is radiating from low back

Follow Up: 5 weeks

                                        ICD-9 : 724.2

ICD-10 : M54.5

                                                    2017

 

                          Visit Diagnosis Plan: Left lower quadrant pain Discuss

ion: Had CT scan of 

abdomen/pelvis in 2017 and normal colonoscopy in 2015

                                        ICD-9 : 789.04

ICD-10 : R10.32

                                                    2017

 

                                        Appointment: Akanksha Driver

WPtel:[phone]

                                        [address] [place], [place] [id number]                                              ACUTE ILLNESS   2017

 

             Patient Education: Patient Medication Summary                      

     Completed    2017

 

                                        Appointment: Akanksha Driver

WPtel:[phone]

                                        [address] [place], [place] [id number]

[place]                                              LAB             2017

 

             Patient Education: Patient Medication Summary                      

     Completed    2017

 

                          Visit Diagnosis Plan: Mixed hyperlipidemia Discussion:

 Check lipids

                                        ICD-9 : 272.4

ICD-10 : E78.2

                                                    2017

 

                          Visit Diagnosis Plan: Impaired fasting glucose Discuss

ion: Return in AM for CMP,

HbA1C Accuchecks daily Diet/Exercise discussed at length

                                        ICD-9 : 790.29

ICD-10 : R73.01

                                                    2017

 

                          Visit Diagnosis Plan: Other fatigue Discussion: Check 

CBC, TSH

                                        ICD-9 : 780.79

ICD-10 : R53.83

                                                    2017

 

                          Visit Diagnosis Plan: Mastodynia Discussion: Check Jace

ateral diagnostic 

mammogram with US

                                        ICD-9 : 611.71

ICD-10 : N64.4

                                                    2017

 

                                        Appointment: Akanksha Driver

WPtel:[phone]

                                        [address] [place], [place] [id number]

[place]              3/ confirmed `sl                 ACUTE ILLNESS   2017

 

             Patient Education: Patient Medication Summary                      

     Completed    2017

 

                    Care Plan: MAMMOGRAM SCREENING                     LOINC : 2

8747-5

                          Pending                   2017

 

                          Visit Diagnosis Plan: Acute upper respiratory infectio

n, unspecified Discussion:

Exam is reassuring Likely viral illness Supportive care reviewed and encouraged 
Follow up PRN

                                        ICD-9 : 465.9

ICD-10 : J06.9

                                                    2017

 

                                        Appointment: Luba Stevenson 

                                        [address] [place] [address]                                              ACUTE ILLNESS   2017

 

             Patient Education: Patient Medication Summary                      

     Completed    2017

 

                          Visit Plan:               Supportive care. Rest, Fluid

s, Tylenol/Motrin prn fever or 

bodyaches. Notify if worsening symptoms.

                                                            2016

 

                                        Appointment: Akanksha Driver

WPtel:[phone]

                                        [address] [place] [address]                                              ACUTE ILLNESS   2016

 

             Patient Education: Patient Medication Summary                      

     Completed    2016

 

                                        Appointment: Akanksha Driver

WPtel:[phone]

                                        [address] [place] [address]                                              INJECTION       10/07/2016

 

             Patient Education: Patient Medication Summary                      

     Completed    10/07/2016

 

                                        Appointment: Aknaksha Driver

WPtel:[phone]

                                        [address] [place] [address]                                              LAB             2016

 

             Patient Education: Patient Medication Summary                      

     Completed    2016

 

                          Visit Plan:               Add miralax daily Use daily 

probiotic and metamucil and push fluids

Notify if worsens/persists

                                                            2016

 

                                        Appointment: Akanksha Driver

WPtel:[phone]

                                        [address] [place] [address]              16 confirmed ~sl                 ACUTE ILLNESS   2016

 

             Patient Education: Patient Medication Summary                      

     Completed    2016

 

                          Visit Plan:               No NSAIDs Kidney function di

scussed Discussed hydration Monitor lab

every 4months so will check CMP, HbA1C next month

                                                            2016

 

                                        Appointment: Akanksha Driver

WPtel:[phone]

                                        [address] [place] [address]              03/15 confirmed ~sl                 ACUTE ILLNESS   2016

 

             Patient Education: Patient Medication Summary                      

     Completed    2016

 

                          Visit Plan:               Vestibular exercises Refill 

flonase Strict low Na diet--discussed 

that needs to read labels Rx for walker with chair given due to muscle 
weakness/unsteadiness Has carotids and abdominal aorta and legs checked in 
January Check Chem 7

                                                            2015

 

                                        Appointment: Akanksha Driver

WPtel:[phone]

                                        [address] [place] [address]              12/15/15 appt confirmed cn                 FOLLOW UP       

 

             Patient Education: Patient Medication Summary                      

     Completed    2015

 

             Patient Education: Vertigo                           Completed    1

2015

 

                                        Appointment: Akanksha Driver

WPtel:[phone]

                                        [address] [place] [address]

[place]                                              INJECTION       10/16/2015

 

             Patient Education: Patient Medication Summary                      

     Completed    10/16/2015

 

                                        Appointment: Akanksha Driver

WPtel:[phone]

                                        [address] [place] [address]                                              UA              09/15/2015

 

             Patient Education: Patient Medication Summary                      

     Completed    09/15/2015

 

                                        Referral: Landon De La Torre

WPtel:[phone]

#1 [facility] [address]              Referral                        Completed       2015

 

                                        Appointment: Akanksha Driver

WPtel:[phone]

                                        [address] [place] [address]                                              LAB             09/10/2015

 

             Patient Education: Patient Medication Summary                      

     Completed    09/10/2015

 

                          Visit Plan:               Check CMP, CBC, TSH, Free T4

, B12, Lipids, HbA1C Discussed 6 small 

meals a day each with protein Would likely benefit from antidepressant Update 
colonoscopy

                                                            2015

 

                                        Appointment: Akanksha Driver

WPtel:[phone]

                                        [address] [place], [place] [id number]              9/8/15 confirmed                 ACUTE ILLNESS   2015

 

             Patient Education: Patient Medication Summary                      

     Completed    2015

 

                          Visit Plan:               Cerumen flush with warm wate

r and peroxide - good results Resume 

Nasonex nasal spray daily Recommended Debrox earwax removal drops

                                                            2015

 

                                        Appointment: Bertha Mon

WPtel:[phone]

                                        [address] [place], [place] [id number]                                              ACUTE ILLNESS   2015

 

             Patient Education: Patient Medication Summary                      

     Completed    2015

 

                          Visit Plan:               Continue aspirin daily Add m

eloxicam for 1week [employer] Call

on 2015

 

                                        Appointment: Akanksha Driver

WPtel:[phone]

                                        [address] [place] [address]                                              ACUTE ILLNESS   2015

 

             Patient Education: Patient Medication Summary                      

     Completed    2015

 

                          Visit Plan:               Been using baclofen at [facility] and has helped some PT for next 

2-4weeks

                                                            2015

 

                                        Appointment: Akanksha Driver

WPtel:[phone]

                                        [address] [place] [address] Follow Up 2015

 

             Patient Education: Patient Medication Summary                      

     Completed    2015

 

                                        Appointment: Akanksha Driver

WPtel:[phone]

                                        [address] [place] [address]                                              LAB             2015

 

                                        Appointment: Akanksha Driver

WPtel:[phone]

                                        [address] [place] [address]              feeling better, weather -                 FOLLOW UP       

 

                                        Referral: Landon De La Torre

WPtel:[phone]

1 [facility]              Referral                        Appointment Requested 2015

 

                          Visit Plan:               Continue exercise and curren

t meds See surgery for removal of right

arm lesion

                                                            2015

 

                                        Appointment: Akanksha Driver

WPtel:[phone]

                                        [address] [place], [place] [address]                                              FOLLOW UP       2015

 

             Patient Education: Patient Medication Summary                      

     Completed    2015

 

                                        Appointment: Akanksha Driver

WPtel:[phone]

                                        [address] [place], [place] [id number]

[place]                                              INJECTION       10/17/2014

 

             Patient Education: Patient Medication Summary                      

     Completed    10/17/2014

 

                                        Appointment: Bertha Mon

WPtel:[phone]

                                        [address] [place], [place] [id number]                                              ACUTE ILLNESS   2014

 

             Patient Education: Patient Medication Summary                      

     Completed    2014

 

                          Visit Plan:               Discussed that likely lumbar

 etiology for leg weakness Will change 

amlodopine to low dose dyazide and see if helps legs and inner ear

                                                            2014

 

                                        Appointment: Akanksha Driver

WPtel:[phone]

                                        [address] [place] [address]                                              FOLLOW UP       2014

 

             Patient Education: Patient Medication Summary                      

     Completed    2014

 

                          Visit Plan:               Ceftin and continue claritin

/flonase Decrease amlodopine to 2.5mg q

HS until fwup with Card due to weakness

                                                            2014

 

                                        Appointment: Akanksha Driver

WPtel:[phone]

                                        [address] [place] [address]                                              ACUTE ILLNESS   2014

 

             Patient Education: Patient Medication Summary                      

     Completed    2014

 

                                        Appointment: Akanksha Driver

WPtel:[phone]

                                        [address] [place] [address]                                              LAB             2014

 

             Patient Education: Patient Medication Summary                      

     Completed    2014

 

                                        Appointment: Bertha Mon

WPtel:[phone]

                                        [address] [place] [address]                                              ACUTE ILLNESS   2014

 

             Patient Education: Patient Medication Summary                      

     Completed    2014

 

                          Visit Plan:               Supportive care. Rest, Fluid

s, Tylenol prn fever or bodyaches. 

Notify if worsening symptoms. Ceftin

                                                            10/15/2013

 

                                        Appointment: Bertha Mon

WPtel:[phone]

                                        [address] [place] [address]                                              ACUTE ILLNESS   10/15/2013

 

             Patient Education: Patient Medication Summary                      

     Completed    10/15/2013

 

                                        Appointment: Akanksha Driver

WPtel:[phone]

                                        [address] [place] [address]                                              BP CHECK        2013

 

             Patient Education: Patient Medication Summary                      

     Completed    2013

 

                                        Appointment: Akanksha Driver

WPtel:[phone]

                                        [address] [place], [place] [id number]

                                              ER Follow UP    2013

 

             Patient Education: Patient Medication Summary                      

     Completed    2013

 

                          Visit Plan:               Restart flonase BID Use mecl

izine 25mg q HS Vestibular exercises 

Claritin 10mg q AM See ENT if doesn't resolve

                                                            2013

 

                                        Appointment: Akanksha Driver

WPtel:[phone]

                                        [address] [place] [address]                                              ACUTE ILLNESS   2013

 

             Patient Education: Patient Medication Summary                      

     Completed    2013

 

                          Visit Plan:               Will continue to observe

                                                            2013

 

                                        Appointment: Akanksha Driver

WPtel:[phone]

                                        [address] [place] [address]                                              FOLLOW UP       2013

 

             Patient Education: Patient Medication Summary                      

     Completed    2013

 

                          Visit Plan:               ERx for Augmentin 875mg q12 

x 10 days and Floxin otic drops 5 gtts 

AD x7days Sample of Nasonex per pt request Discussed treatment (nasal saline, 
salt water gargles, keeping right ear clean and dry, for worsening go to UC on 
weekend, Tylenol/ibuprofen, etc.) RTC 2 weeks for ear recheck.

                                                            05/10/2013

 

                                        Appointment: Jill Jean 

WPtel:[phone]

                                        [address] [place] [address]                                              ACUTE ILLNESS   05/10/2013

 

             Patient Education: Patient Medication Summary                      

     Completed    05/10/2013

 

                          Visit Plan:               Decrease caffeine intake Marina

ck Bilateral Mammogram with US of left 

breast

                                                            2013

 

                                        Appointment: Akanksha Driver

WPtel:[phone]

                                        [address] [place], [place] [id number]                                              ACUTE ILLNESS   2013

 

             Patient Education: Patient Medication Summary                      

     Completed    2013

 

                                        Appointment: Kate Hall

WPtel:[phone]

                                        [address] [place], [place] [id number]

              appt scheduled                  ACUTE ILLNESS   2013

 

                                        Appointment: Akanksha Driver

WPtel:[phone]

                                        [address] [place], [place] [id number]

[place]                                              LAB             2012

 

             Patient Education: Patient Medication Summary                      

     Completed    2012

 

                          Visit Plan:               Continue off carafate and se

e how does Continue probiotic Add 

Vestibular exercises and use meclizine prn Continue Nasonex Check fasting lab 
including CMP, Lipids, CBC, TSH, Free T4, HbA1C

                                                            2012

 

                                        Appointment: Akanksha Drivertel:[phone]

                                        [address] [place], [place] [id number]

                                              FOLLOW UP       2012

 

             Patient Education: Patient Medication Summary                      

     Completed    2012

 

                          Visit Plan:               Continue carafate for 4more 

weeks at current dose then decrease to 

BID for 2wks then q HS

                                                            10/23/2012

 

                                        Appointment: Akanksha Driver

WPtel:[phone]

                                        [address] [place], [place] [id number]                                              FOLLOW UP       10/23/2012

 

             Patient Education: Patient Medication Summary                      

     Completed    10/23/2012

 

                          Visit Plan:               Continue omeprazole Add Ellyn

fate 1gm po q AC Add daily probiotic

                                                            10/10/2012

 

                                        Appointment: Akanksha Driver

WPtel:[phone]

                                        [address] [place] [address]                                              ACUTE ILLNESS   10/10/2012

 

             Patient Education: Patient Medication Summary                      

     Completed    10/10/2012

 

                                        Appointment: Akanksha Drivertel:[phone]

                                        [address] [place] [id number]                                              INJECTION       2012

 

             Patient Education: Patient Medication Summary                      

     Completed    2012

 

                          Visit Plan:               Diabetic Diet Accuchecks BID

 alternating times Hold onglyza and 

Januvia for now and continue diet and exercise and weight loss and moniter BS 
Discussed hypoglycemia and snack of peanut butter and crackers with juice or 
milk

                                                            2012

 

                                        Appointment: Akanksha Drivertel:[phone]

                                        [address] [place], [place] [id number]

                                              WORK IN         2012

 

             Patient Education: Patient Medication Summary                      

     Completed    2012

 

                                        Appointment: Akanksha Driver

WPtel:[phone]

                                        [address] [place] [address]                                              UA              2012

 

             Patient Education: Patient Medication Summary                      

     Completed    2012

 

                                        Appointment: Akanksha Drivertel:[phone]

                                        [address] [place] [address]                                              LAB             2012

 

             Patient Education: Patient Medication Summary                      

     Completed    2012

 

                                        Appointment: Akanksha Driver

WPtel:[phone]

                                        [address] [place] [address]                                              ACUTE ILLNESS   2012

 

             Patient Education: Patient Medication Summary                      

     Completed    2012

 

                          Visit Plan:               Injection to Right SI joint 

as above Pt will call in 3 days on pain

Has PT starting in 2wks.

                                                            2012

 

                                        Appointment: Akanksha Driver

WPtel:[phone]

                                        [address] [place] [address]                                              ACUTE ILLNESS   2012

 

             Patient Education: Patient Medication Summary                      

     Completed    2012

 

                          Visit Plan:               OMT done Start PT May need u

pdated MRI if need to consider epidural

Prednisone

                                                            2012

 

                                        Appointment: Akanksha Driver

WPtel:[phone]

                                        [address] [place] [address]                                              OMT             2012

 

             Patient Education: Patient Medication Summary                      

     Completed    2012

 

                          Visit Plan:               Flexeril and Vimovo OMT done

 Daily stretches

                                                            2012

 

                                        Appointment: Akanksha Driver

WPtel:[phone]

                                        [address] [place] [address]                                              ACUTE ILLNESS   2012

 

             Patient Education: Patient Medication Summary                      

     Completed    2012

 

                          Visit Plan:               OMT done Daily stretches Vinod

st heat or biofreeze Right SI joint 

injection Call in 1week Vimovo BID

                                                            2012

 

                                        Appointment: Akanksha Driver

WPtel:[phone]

                                        [address] [place] [address]                                              ACUTE ILLNESS   2012

 

             Patient Education: Patient Medication Summary                      

     Completed    2012

 

                                        Appointment: Akanksha Driver

WPtel:[phone]

                                        [address] [place], [place] [id number]

[place]                                              LAB             2012

 

             Patient Education: Patient Medication Summary                      

     Completed    2012

 

                          Visit Plan:               Decrease amlodopine to 1.25m

g daily Schedule with Cardiology 

Fasting lab in AM

                                                            2012

 

                                        Appointment: Akanksha Drivertel:[phone]

                                        [address] [place], [place] [id number]

                                              ACUTE ILLNESS   2012

 

             Patient Education: Patient Medication Summary                      

     Completed    2012

 

                          Visit Plan:               Saline nasal flushes prn. Ty

lenol/Motrin prn headache. Notify if 

persists/symptoms worsening. Cerumen removal from ears as above

                                                            2011

 

                                        Appointment: Akanksha Driver

WPtel:[phone]

                                        [address] [place] [id number]                                              ACUTE ILLNESS   2011

 

             Patient Education: Patient Medication Summary                      

     Completed    2011

 

                                        Appointment: Akanksha Driver

WPtel:[phone]

                                        [address] [place], [place] [id number]

[place]                                              INJECTION       10/05/2011

 

             Patient Education: Patient Medication Summary                      

     Completed    10/05/2011

 

                          Visit Plan:               Continue vimovo for 1more we

ek Increase Omeprazole to BID for 1mo 

then resume QD if stomach improved Vestibular exercises with meclizine q HS for 
next week

                                                            2011

 

                                        Appointment: Akanksha Driver

WPtel:[phone]

                                        [address] [place], [place] [id number]

                                              FOLLOW UP       2011

 

             Patient Education: Patient Medication Summary                      

     Completed    2011

 

                                        Appointment: Akanksha Driver

WPtel:[phone]

                                        [address] [place], [place] [id number]

                                              ACUTE ILLNESS   2011

 

             Patient Education: Patient Medication Summary                      

     Completed    2011

 

                                        Appointment: Akanksha Driver

WPtel:[phone]

                                        [address] [place], [place] [id number]

[place]                                              LAB             2011

 

             Patient Education: Patient Medication Summary                      

     Completed    2011

 

                          Visit Plan:               Saline nasal flushes prn. Ty

lenol/Motrin prn headache. Notify if 

persists/symptoms worsening. Add Veramyst Increase Omeprazole to 20mg po BID

                                                            2011

 

                                        Appointment: Akanksha Driver

WPtel:[phone]

                                        [address] [place] [address]                                              ACUTE ILLNESS   2011

 

             Patient Education: Patient Medication Summary                      

     Completed    2011

 

                                        Appointment: Akanksha Driver

WPtel:[phone]

                                        [address] [place] [address]                                              FOLLOW UP       2011

 

             Patient Education: Patient Medication Summary                      

     Completed    2011

 

                                        Appointment: Akanksha Driver

WPtel:[phone]

                                        [address] [place] [address]                                              ACUTE ILLNESS   2011

 

             Patient Education: Patient Medication Summary                      

     Completed    2011

 

                          Visit Plan:               Nasocourt sample given. Pt. 

will notify if symptoms are worse on 

Monday.

                                                            2010

 

                                        Appointment: Kate Hall

WPtel:[phone]

                                        [address] [place] [address]                                              ACUTE ILLNESS   2010

 

             Patient Education: Patient Medication Summary                      

     Completed    2010

 

                                        Appointment: Akanksha Driver

WPtel:[phone]

                                        [address] [place] [address]

[place]                                              INJECTION       10/06/2010

 

             Patient Education: Patient Medication Summary                      

     Completed    10/06/2010

 

                                        Appointment: Akanksha Driver

WPtel:[phone]

                                        [address] [place] [address]                                              FOLLOW UP       2010

 

             Patient Education: Patient Medication Summary                      

     Completed    2010

 

             Patient Education: Lexapro                           Completed    0

2010

 

                          Visit Plan:               May proceed with hiatal mykel

ia repair per Card. so will contact Dr. Cash's office to proceed with surgery Trial of Lexapro 5mg QD plus use 
xanax prn

                                                            2010

 

                                        Appointment: Akanksha Driver

WPtel:[phone]

                                        [address] [place], [place] [address]

                                              FOLLOW UP       2010

 

             Patient Education: Patient Medication Summary                      

     Completed    2010

 

                          Visit Plan:               B12 given Cont oral B12 and 

iron Fwup 1mo for B12 Proceed with 

hiatal hernia repair once card clearance

                                                            2010

 

                                        Appointment: Akanksha Driver

WPtel:[phone]

                                        [address] [place], [place] [id number]

                                              FOLLOW UP       2010

 

             Patient Education: Patient Medication Summary                      

     Completed    2010

 

                                        Appointment: Akanksha Driver

WPtel:[phone]

                                        [address] [place], [place] [id number]

                                              FOLLOW UP       2010

 

             Patient Education: Patient Medication Summary                      

     Completed    2010

 

                                        Appointment: Akanksha Driver

WPtel:[phone]

                                        [address] [place], [place] [id number]

[place]                                              LAB             2010

 

             Patient Education: Patient Medication Summary                      

     Completed    2010

 

                          Visit Plan:               Check fasting lab in AM--CMP

,Lipids, CBC, Vit D, TSH,FreeT4, B12

                                                            2010

 

                                        Appointment: Akanksha Driver

WPtel:[phone]

                                        [address] [place], [place] [address]                                              FOLLOW UP       2010

 

             Patient Education: Patient Medication Summary                      

     Completed    2010

 

                                        Referral: Landon De La Torre

WPtel:[phone]

#1 [facility]              Referral                        Appointment Requested  

 

                                        Referral: Landon De La Torre

WPtel:[phone]

#[address]              Referral                        Initiated        







Instructions





                                        Comment

 

                                        . Supportive care.  Rest, Fluids, Tyleno

l/Motrin prn fever or bodyaches.  Notify

if worsening symptoms.



 

                                        . Add miralax daily

Use daily probiotic and metamucil and push fluids

Notify if worsens/persists

 

                                        . No NSAIDs

Kidney function discussed

Discussed hydration 

Monitor lab every 4months so will check CMP, HbA1C next month

 

                                        . Vestibular exercises

Refill flonase

Strict low Na diet--discussed that needs to read labels

Rx for walker with chair given due to muscle weakness/unsteadiness

Has carotids and abdominal aorta and legs checked in January

Check Chem 7



 

                                        . Check CMP, CBC, TSH, Free T4, B12, Lip

ids, HbA1C

Discussed 6 small meals a day each with protein

Would likely benefit from antidepressant 

Update colonoscopy

 

                                        . Cerumen flush with warm water and tyler

xide - good results 

Resume Nasonex nasal spray daily

Recommended Debrox earwax removal drops 

 

                                        . Continue aspirin daily

Add meloxicam for 1week

Elevate and Ice

Call on Monday

 

                                        . Been using baclofen at bedtime and has

 helped some

PT for next 2-4weeks



 

                                        . Continue exercise and current meds

See surgery for removal of right arm lesion

 

                                        . Discussed that likely lumbar etiology 

for leg weakness

Will change amlodopine to low dose dyazide and see if helps legs and inner ear

 

                                        . Ceftin and continue claritin/flonase

Decrease amlodopine to 2.5mg q HS until fwup with Card due to weakness

 

                                        .  Supportive care.  Rest, Fluids, Tylen

ol prn fever or bodyaches.  Notify if 

worsening symptoms.

Ceftin

 

                                        . Restart flonase BID

Use meclizine 25mg q HS

Vestibular exercises

Claritin 10mg q AM

See ENT if doesn't resolve

 

                                        . Will continue to observe



 

                                        . ERx for Augmentin 875mg q12 x 10 days 

and Floxin otic drops 5 gtts AD x7days

Sample of Nasonex per pt request

Discussed treatment (nasal saline, salt water gargles, keeping right ear clean 
and dry, for worsening go to UC on weekend, Tylenol/ibuprofen, etc.)

RTC 2 weeks for ear recheck.

 

                                        . Decrease caffeine intake

Check Bilateral Mammogram with US of left breast

 

                                        Left ear flushed with warm water and per

oxide with ear syringe and then last 

removed with currette--peroxide drops instilled.  Tolerated well, no 
complications, TM intact.. Continue off carafate and see how does

Continue probiotic

Add Vestibular exercises and use meclizine prn

Continue Nasonex

Check fasting lab including CMP, Lipids, CBC, TSH, Free T4, HbA1C

 

                                        . Continue carafate for 4more weeks at c

urrent dose then decrease to BID for 

2wks then q HS

 

                                        . Continue omeprazole

Add Carafate 1gm po q AC

Add daily probiotic



 

                                        . Diabetic Diet

Accuchecks BID alternating times

Hold onglyza and Januvia for now and continue diet and exercise and weight loss 
and moniter BS

Discussed hypoglycemia and snack of peanut butter and crackers with juice or 
milk

 

                                        Informed Consent obtainded.  Right SI yasir

int cleansed with alcohol and betadine 

and injected with 3cc 1%l lidocaine with 40mg depomedrol and 40mg kenalog, 
tolerated well with no complications, neosporin and bandage applied. Injection 
to Right SI joint as above

Pt will call in 3 days on pain

Has PT starting in 2wks.

 

                                        . OMT done

Start PT

May need updated MRI if need to consider epidural

Prednisone

 

                                        . Flexeril and Vimovo

OMT done

Daily stretches

 

                                        Right SI joint cleansed with alchohol an

d betadine and injected with 3cc 1% 

lidocaine with 40mg depomedrol and 40mg kenalog, tolerated well with no 
complications, neosporin and bandage applied. OMT done

Daily stretches

Moist heat or biofreeze

Right SI joint injection

Call in 1week

Vimovo BID

 

                                        . Decrease amlodopine to 1.25mg daily

Schedule with Cardiology

Fasting lab in AM

 

                                        .  Saline nasal flushes prn. Tylenol/Mot

rin prn headache.  Notify if 

persists/symptoms worsening.

Cerumen removal from ears as above



 

                                        . Continue vimovo for 1more week

Increase Omeprazole to BID for 1mo then resume QD if stomach improved

Vestibular exercises with meclizine q HS for next week

 

                                        . Saline nasal flushes prn. Tylenol/Motr

in prn headache.  Notify if 

persists/symptoms worsening.

Add Veramyst

Increase Omeprazole to 20mg po BID

 

                                        . Nasocourt sample given.  Pt. will noti

fy if symptoms are worse on Monday. 

 

                                        . May proceed with hiatal hernia repair 

per Card. so will contact Dr. Cash's office to proceed with surgery



Trial of Lexapro 5mg QD plus use xanax prn

 

                                        . B12 given

Cont oral B12 and iron

Fwup 1mo for B12

Proceed with hiatal hernia repair once card clearance

 

                                        . Check fasting lab in AM--CMP,Lipids, C

BC, Vit D, TSH,FreeT4, B12







Medical Equipment

No Medical Equipment data



Health Concerns Section

Health Concerns data not found



Goals Section

Goals data not found



Interventions Section

Interventions data not found



Health Status Evaluations/Outcomes Section

Health Status Evaluations/Outcomes data not found



Advance Directives

No Advance Directive data

## 2020-02-19 NOTE — XMS REPORT
CCD document using C-CDA

                             Created on: 2020



Leilani Ramírez

External Reference #: [id number]

: 1940

Sex: Female



Demographics





                          Address                   [address] [place]  [address]

 

                          Home Phone                [phone]

 

                          Preferred Language        Unknown

 

                          Marital Status            

 

                          Adventism Affiliation     Unknown

 

                          Race                      White

 

                          Ethnic Group              Not  or 





Author





                          Author                    Leilani Driver D.O.

 

                          Organization              [facility]

 

                          Address                   [address] [place]  [address]



 

                          Phone                     [phone]







Care Team Providers





                    Care Team Member Name Role                Phone

 

                    Akanksha Driver D.O. PP                  Unavailable

 

                          CCM                       Unavailable







Summary Purpose

Interface Exchange



Insurance Providers





             Payer name   Policy type / Coverage type Covered party ID Effective

 Begin Date 

Effective End Date

 

             [employer] [place] B [id number]  2018   

  Unknown

 

             [employer] Senior Supplemental Medicare Part B [id number]   [id number]    

 Unknown







Family history





Father



                          Diagnosis                 Age At Onset

 

                          Heart disease             Unknown







Mother



                          Diagnosis                 Age At Onset

 

                          Heart disease             Unknown

 

                          Cancer                    Unknown







Social History





                Social History Element Codes           Description     Effective

 Dates

 

                Tobacco history SNOMED CT: 137490881 Never smoker    2011

 

                Marital status  Unknown         Single          2010

 

                Number of children Unknown         9               2010







Allergies, Adverse Reactions, Alerts





           Substance  Reaction   Codes      Entered Date Inactivated Date Status

 

           _                     Unknown    2019 No Inactive Date Active

 

           * NO KNOWN FOOD ALLERGIES            Unknown    2010 No Inactiv

e Date Active

 

           _                     Unknown    2010 No Inactive Date Active

 

           QUINIDINE-QUININE ANALOGUES hives,     Unknown    2010 No Inact

diamante Date Active







Problems





                Condition       Codes           Effective Dates Condition Status

 

                          Essential (primary) hypertension ICD-9: 401.9

ICD-10: I10               2014                Active

 

                          Palpitations              ICD-9: 785.1

ICD-10: R00.2             10/02/2018                Active

 

                          Stress reaction           ICD-9: 308.9

ICD-10: F43.0             2020                Active

 

                          Mixed hyperlipidemia      ICD-9: 272.4

ICD-10: E78.2             2019                Active

 

                          FLU VACCINE               ICD-9: V04.81

ICD-10: Z23               2012                Active

 

                          Acute serous otitis media, left ear ICD-9: 381.01

ICD-10: H65.02            2019                Active

 

                          Coronary atherosclerosis due to calcified coronary les

ion ICD-9: 414.00

ICD-10: I25.84            2018                Active

 

                          Hypoglycemia, unspecified ICD-9: 251.2

ICD-10: E16.2             2017                Active

 

                          Other fatigue             ICD-9: 780.79

ICD-10: R53.83            2017                Active

 

                          Urinary tract infection, site not specified ICD-9: 599

.0

ICD-10: N39.0             10/02/2018                Active

 

                          Gastro-esophageal reflux disease without esophagitis I

CD-9: 530.81

ICD-10: K21.9             2018                Active

 

                          Epigastric pain           ICD-9: 789.06

ICD-10: R10.13            2018                Active

 

                          Atherosclerotic heart disease of native coronary arter

y without angina pectoris 

ICD-9: 414.00

ICD-10: I25.10            2018                Active

 

                          Generalized anxiety disorder ICD-9: 300.00

ICD-10: F41.1             2018                Active

 

                          Dizziness and giddiness   ICD-9: 780.4

ICD-10: R42               2014                Active

 

                          Occlusion and stenosis of bilateral carotid arteries I

CD-9: 433.10

ICD-10: I65.23            2018                Active

 

                          Cervicalgia               ICD-9: 723.1

ICD-10: M54.2             2018                Active

 

                          Other spondylosis with radiculopathy, cervical region 

ICD-9: 721.0

ICD-10: M47.22            2018                Active

 

                          Cervicocranial syndrome   ICD-9: 723.2

ICD-10: M53.0             2018                Active

 

                          Vertigo of central origin, bilateral ICD-9: 386.2

ICD-10: H81.43            2018                Active

 

                          Benign paroxysmal vertigo, bilateral ICD-9: 386.11

ICD-10: H81.13            2018                Active

 

                          Otalgia, bilateral        ICD-9: 388.70

ICD-10: H92.03            2018                Active

 

                          Left lower quadrant pain  ICD-9: 789.04

ICD-10: R10.32            2017                Active

 

                          Low back pain             ICD-9: 724.2

ICD-10: M54.5             2017                Active

 

                          Radiculopathy, lumbosacral region ICD-9: 724.4

ICD-10: M54.17            2018                Active

 

                          Impaired fasting glucose  ICD-9: 790.29

ICD-10: R73.01            2012                Active

 

                          Other intervertebral disc degeneration, lumbar region 

ICD-9: 722.52

ICD-10: M51.36            2017                Active

 

                          Pain in thoracic spine    ICD-9: 724.1

ICD-10: M54.6             2017                Active

 

                          Mastodynia                ICD-9: 611.71

ICD-10: N64.4             2017                Active

 

                          Acute upper respiratory infection, unspecified ICD-9: 

465.9

ICD-10: J06.9             2017                Active

 

                          Acute mastoiditis without complications, right ear ICD

-9: 383.00

ICD-10: H70.001           2016                Active

 

                          Constipation, unspecified ICD-9: 564.00

ICD-10: K59.00            2016                Active

 

                          Chronic kidney disease, stage 1 ICD-9: 585.1

ICD-10: N18.1             03/15/2016                Active

 

                          History of falling        ICD-9: V15.88

ICD-10: Z91.81            12/15/2015                Active

 

                          Muscle weakness (generalized) ICD-9: 780.79

ICD-10: M62.81            2015                Active

 

                Acute renal failure ICD-9: 584.9    2015      Active

 

                POLYURIA        ICD-9: 788.42   2015      Active

 

                CAD             ICD-9: 414.00   09/10/2015      Active

 

                Hyperglycemia   ICD-9: 790.29   2012      Active

 

                HYPERLIPIDEMIA NEC/NOS ICD-9: 272.4    09/10/2015      Active

 

                ABDOMINAL PAIN  ICD-9: 789.00   10/10/2012      Active

 

                Grieving        ICD-9: 309.0    2015      Active

 

                HYPOGLYCEMIA    ICD-9: 251.2    2012      Active

 

                MALAISE AND FATIGUE ICD-9: 780.79   2015      Active

 

                CERUMEN IMPACTION ICD-9: 380.4    2015      Active

 

                Leg pain        ICD-9: 729.5    2015      Active

 

                SUPERFIC PHLEBITIS-LEG ICD-9: 451.0    2015      Active

 

                SPASM OF MUSCLE ICD-9: 728.85   2015      Active

 

                Thoracic back pain ICD-9: 724.1    2015      Active

 

                Benign positional vertigo ICD-9: 386.11   2015      Active

 

                Suspicious nevus ICD-9: 238.2    2015      Active

 

                EUSTACHIAN TUBE DYSFUNCTION ICD-9: 381.81   2014      Acti

ve

 

                SINUSITIS, ACUTE ICD-9: 461.9    2014      Active

 

                DIZZINESS/VERTIGO ICD-9: 780.4    2014      Active

 

                HYPERTENSION    ICD-9: 401.9    2014      Active

 

                URI, ACUTE      ICD-9: 465.9    10/15/2013      Active

 

                CEPHALGIA       ICD-9: 784.0    2013      Active

 

                OTITIS MEDIA NOS ICD-9: 382.9    2013      Active

 

                Perforation of ear drum ICD-9: 384.20   05/10/2013      Active

 

                Mastalgia       ICD-9: 611.71   2013      Active

 

                DYSPEPSIA       ICD-9: 536.8    10/10/2012      Active

 

                IBS             ICD-9: 564.1    10/10/2012      Active

 

                FLU VACCINE     ICD-9: V04.81   2012      Active

 

                Radiculopathy of leg ICD-9: 724.4    2012      Active

 

                SCIATICA        ICD-9: 724.3    2012      Active

 

                Lumbar degenerative disc disease ICD-9: 722.52   2012     

 Active

 

                Sacroiliac dysfunction ICD-9: 739.4    2012      Active

 

                Hypertension    Unknown         2012      Active

 

                PAIN, LOWER BACK ICD-9: 724.2    2011      Active

 

                SPINAL ENTHESOPATHY ICD-9: 720.1    2011      Active

 

                Hypotension     ICD-9: 458.9    2011      Active

 

                SACROILIITIS NEC ICD-9: 720.2    2011      Active

 

                PHARYNGITIS, ACUTE ICD-9: 462      2010      Active

 

                URINARY TRACT INFECTION ICD-9: 599.0    2010      Active

 

                Heat exhaustion ICD-9: 992.5    2010      Active

 

                Esophagitis     ICD-9: 530.10   2010      Active

 

                Gastritis       ICD-9: 535.50   2010      Active

 

                GERD            ICD-9: 530.81   2010      Active

 

                Hiatal hernia   ICD-9: 553.3    2010      Active

 

                B12 DEFIC ANEMIA NEC ICD-9: 281.1    2010      Active

 

                Anxiety         Unknown         2010      Active

 

                Heart disease   Unknown         2010      Active

 

                Hyperlipidemia  Unknown         2010      Active

 

                ANXIETY STATE NOS ICD-9: 300.00   2010      Active

 

                DEPRESSIVE DISORDER NEC ICD-9: 311      2010      Active







Medications





          Medication Codes     Instructions Start Date Stop Date Status    Fill 

Instructions

 

                omeprazole 40 mg capsule,delayed release RxNorm: 958133  1 Capsu

le(s) Oral QD 

2020          10/17/2020          Active               

 

                    Xanax 0.25 mg tablet RxNorm: 160648      TAKE 1 TABLET BY MO

[facility] TWICE DAILY 1 

Tablet(s) Oral two times a day as needed as needed for anxiety 2020                Inactive                   

 

             simvastatin 40 mg tablet RxNorm: 738316 1 Tablet(s) Oral QD 

020   

10/17/2020                Active                     

 

                    metoprolol tartrate 25 mg tablet RxNorm: 572757      1 Table

t(s) Oral QAM and two 

tablets (50mg) in the evening 2020      Active           

 

                    metoprolol tartrate 25 mg tablet RxNorm: 283136      1 Table

t(s) Oral QAM and two 

tablets (50mg) in the evening 2020      Inactive         

 

                    Flonase Allergy Relief 50 mcg/actuation nasal spray,suspensi

on RxNorm: 7524100     2

Spray Nasal every night at bedtime 2020      Inactive     

    

 

           simvastatin 40 mg tablet RxNorm: 172509 1 Tablet(s) PO QD 2019 

Inactive                                 

 

                omeprazole 40 mg capsule,delayed release RxNorm: 327402  1 Capsu

le(s) Oral QD 

2019          Inactive             

 

                Xanax 0.25 mg tablet RxNorm: 678347  TAKE 1 TABLET BY MOUTH TWIC

E DAILY 

10/29/2019          2020          Inactive             

 

                omeprazole 40 mg capsule,delayed release RxNorm: 681068  1 Capsu

le(s) PO QD 

10/07/2019          2019          Inactive             

 

             metoprolol tartrate 25 mg tablet RxNorm: 758235 1 Tablet(s) PO BID 

2019                Inactive                   

 

                omeprazole 40 mg capsule,delayed release RxNorm: 716598  1 Capsu

le(s) PO QD 

2019          10/06/2019          Inactive             

 

                    Flonase Allergy Relief 50 mcg/actuation nasal spray,suspensi

on RxNorm: 2340620     2

[place] [facility] 2019      Inactive         

 

           simvastatin 40 mg tablet RxNorm: 855116 1 Tablet(s) PO QD 2019 

Inactive                                 

 

           simvastatin 40 mg tablet RxNorm: 748791 1 Tablet(s) PO QD 2019 

Inactive                                 

 

                omeprazole 40 mg capsule,delayed release RxNorm: 876434  1 Capsu

le(s) PO QD 

2019          Inactive             

 

           simvastatin 40 mg tablet RxNorm: 957451 1 Tablet(s) PO QD 2019 

Inactive                                 

 

                omeprazole 40 mg capsule,delayed release RxNorm: 773254  1 Capsu

le(s) PO QD 

2019          Inactive             

 

             Bactrim  mg-160 mg tablet RxNorm: 249212 1 Tablet(s) PO BID 0

3/08/2019   

2019                Inactive                   

 

             Bactrim  mg-160 mg tablet RxNorm: 227560 1 Tablet(s) PO BID 0

3/08/2019   

2019                Inactive                   

 

             Macrobid 100 mg capsule RxNorm: 999689 1 Capsule(s) PO BID 20

                          Inactive                   

 

             metoprolol tartrate 25 mg tablet RxNorm: 419003 1 Tablet(s) PO BID 

2019                Inactive                   

 

                Xanax 0.25 mg tablet RxNorm: 219107  TAKE 1 TABLET BY MOUTH TWIC

E DAILY 

2018          10/28/2019          Inactive             

 

           Xanax 0.25 mg tablet RxNorm: 402424 1 Tablet(s) PO BID 2018 

Inactive                                 

 

                    Protonix 40 mg tablet,delayed release RxNorm: 098494      1 

Tablet(s) PO BID for 

stomach--replaces omeprazole 2018      Inactive         

 

             Carafate 1 gram tablet RxNorm: 376682 1 Tablet(s) PO AC & HS 2019                Inactive                   

 

           Xanax 0.25 mg tablet RxNorm: 680869 1 Tablet(s) PO BID 10/30/2018 12/

10/2018 

Inactive                                 

 

             Bactrim  mg-160 mg tablet RxNorm: 268564 1 Tablet(s) PO BID 1

   

10/08/2018                Inactive                   

 

             Bactrim  mg-160 mg tablet RxNorm: 813659 1 Tablet(s) PO BID 1

   

10/03/2018                Inactive                   

 

             Carafate 1 gram tablet RxNorm: 923383 1 Tablet(s) PO AC & HS 09/18/

2018   

10/17/2018                Inactive                   

 

                    Protonix 40 mg tablet,delayed release RxNorm: 612401      1 

Tablet(s) PO BID for 

stomach--replaces omeprazole 2018      Inactive         

 

                    Protonix 40 mg tablet,delayed release RxNorm: 880286      1 

Tablet(s) PO BID for 

stomach--replaces omeprazole 2018      Inactive         

 

                    fluticasone 50 mcg/actuation nasal spray,suspension RxNorm: 

4728966     2 Spray 

NASAL QD to each nostril 2018      Inactive         

 

             Nasonex 50 mcg/actuation Spray RxNorm: 9733004 2 [place] [facility] 2018                Inactive                   

 

                omeprazole 40 mg capsule,delayed release RxNorm: 957049  1 Capsu

le(s) PO QD 

2018          08/15/2018          Inactive             

 

                    simvastatin 40 mg tablet RxNorm: 415521      1 Tablet(s) PO 

QD TAKE 1 TABLET EVERY 

DAY             2018      Inactive         

 

             metoprolol tartrate 25 mg tablet RxNorm: 690665 1/2 Tablet(s) PO QD

 2018                Inactive                   

 

                simvastatin 40 mg tablet RxNorm: 444508  Tablet(s) TAKE 1 TABLET

 EVERY DAY 

2017          Inactive             

 

             metoprolol tartrate 25 mg tablet RxNorm: 023711 1/2 Tablet(s) PO QD

 2017                Inactive                   

 

                    Flonase 50 mcg/actuation nasal spray,suspension RxNorm: 1797

933     2 [place] [facility] 

BID             2017      Inactive         

 

                omeprazole 40 mg capsule,delayed release RxNorm: 120000  1 Capsu

le(s) PO QD 

2017          Inactive             

 

             metoprolol tartrate 25 mg tablet RxNorm: 687867 1/2 Tablet(s) PO QD

 04/10/2017   

2017                Inactive                   

 

                    ciprofloxacin 0.2 % ear drops in a dropperette RxNorm: 16374

6      4 Drop(s) OTIC TID

for 1 week      2016      Inactive         

 

           cefdinir 300 mg capsule RxNorm: 733800 2 Capsule(s) PO QD 2016 

Inactive                                 

 

                    omeprazole 40 mg capsule,delayed release RxNorm: 322441     

 TAKE 1 CAPSULE EVERY DAY

                2016      Inactive         

 

             simvastatin 40 mg tablet RxNorm: 454017 TAKE 1 TABLET EVERY DAY 2017                Inactive                   

 

                    Flonase 50 mcg/actuation nasal spray,suspension RxNorm: 1797

933     2 [place] [facility] 

BID             2015      Inactive         

 

             cefuroxime axetil 250 mg tablet RxNorm: 723809 1 Tablet(s) PO BID 0

2015                Inactive                   

 

             cefuroxime axetil 250 mg tablet RxNorm: 800772 1 Tablet(s) PO BID 0

2015                Inactive                   

 

                omeprazole 40 mg capsule,delayed release RxNorm: 935890  1 Capsu

le(s) PO QD 

2015          Inactive             

 

           meloxicam 7.5 mg tablet RxNorm: 779556 1 Tablet(s) PO QD 2015 0

2015 

Inactive                                 

 

                loratadine 10 mg tablet RxNorm: 507132  1 Tablet(s) PO QAM for a

llergies 

2014          Inactive             

 

                    Flonase 50 mcg/actuation nasal spray,suspension RxNorm: 8963

23      2 [place] [facility] BID

                2014      12/15/2015      Inactive         

 

           prednisone 20 mg tablet RxNorm: 753871 2 Tablet(s) PO BID 2014 

Inactive                                 

 

             Dyazide 37.5 mg-25 mg capsule RxNorm: 660162 1 Capsule(s) PO QAM 2014                Inactive                   

 

           Ceftin 500 mg tablet RxNorm: 760581 1 Tablet(s) PO BID 2014 

Inactive                                 

 

           Ceftin 500 mg tablet RxNorm: 809308 1 Tablet(s) PO BID 2014 

Inactive                                 

 

                    simvastatin 40 mg tablet RxNorm: 280980      Tablet(s) PO TA

KE ONE TABLET BY MOUTH 

EVERY DAY       2014      Inactive         

 

                    metoprolol tartrate 25 mg tablet RxNorm: 683655      Tablet(

s) PO TAKE ONE-HALF 

TABLET BY MOUTH EVERY DAY 2014      Inactive         

 

           Ceftin 500 mg tablet RxNorm: 785769 1 Tablet(s) PO BID 10/15/2013 10/

 

Inactive                                 

 

                loratadine 10 mg tablet RxNorm: 038466  1 Tablet(s) PO QAM for a

llergies 

2013          Inactive             

 

                    omeprazole 20 mg capsule,delayed release RxNorm: 378575     

 Capsule(s) PO TAKE ONE 

CAPSULE BY MOUTH TWICE DAILY 2013      Inactive         

 

                omeprazole 20 mg capsule,delayed release RxNorm: 349751  1 Capsu

le(s) PO BID 

2013          Inactive             

 

             Augmentin 875 mg-125 mg tablet RxNorm: 806010 1 Tablet(s) PO Q12H 0

5/10/2013   

2013                Inactive                   

 

             Floxin Otic Drops 1 bottle Drops RxNorm:      5 Drop(s) OTIC BID 05

/10/2013   

2013                Inactive                   

 

                    metoprolol tartrate 25 mg tablet RxNorm: 054549      Tablet(

s) PO TAKE ONE-HALF 

TABLET BY MOUTH EVERY DAY 2013      Inactive         

 

                    simvastatin 40 mg tablet RxNorm: 053899      Tablet(s) PO TA

KE ONE TABLET BY MOUTH 

EVERY DAY       2013      Inactive         

 

                lancets         RxNorm:         Misc Miscellaneous USE ONE TO CH

YOGI GLUCOSE EVERY DAY 

2013          Inactive             

 

             Carafate 1 gram tablet RxNorm: 623983 1 Tablet(s) PO AC & HS 2015                Inactive                   

 

           Flagyl 500 mg tablet RxNorm: 420434 1 Tablet(s) PO TID 10/10/2012 10/

 

Inactive                                 

 

             Carafate 1 gram tablet RxNorm: 248097 1 Tablet(s) PO AC & HS 10/10/

2012   

2012                Inactive                   

 

          One Touch Test strips RxNorm:   Miscellaneous QD 2012

 Inactive  

one touch ultra mini test strips

 

           Protonix 40 mg Tab RxNorm: 579679 1 Tablet(s) PO QD 2012 

Inactive                                 

 

           Protonix 40 mg Tab RxNorm: 266093 1 Tablet(s) PO QD 2012 10/09/

2012 

Inactive                                 

 

           prednisone 20 mg Tab RxNorm: 230460 1 Tablet(s) PO BID 2012 

Inactive                                 

 

             Flexeril 5 mg Tab RxNorm: 507477 1 Tablet(s) PO QHS for spasm 2012                Inactive                   

 

             Flexeril 5 mg Tab RxNorm: 804352 1 Tablet(s) PO QHS for spasm 2012                Inactive                   

 

                amlodipine 2.5 mg Tab RxNorm: 848510  1/2 Tablet(s) PO QD replac

es 5mg dose 

2012          Inactive             

 

           simvastatin 40 mg tablet RxNorm: 960226 1 Tablet(s) PO QD 2012 

Inactive                                 

 

             metoprolol tartrate 25 mg tablet RxNorm: 406660 1/2 Tablet(s) PO QD

 2012                Inactive                   

 

                omeprazole 20 mg capsule,delayed release RxNorm: 723510  1 Capsu

le(s) PO BID 

2012          Inactive             

 

           cefdinir 300 mg Cap RxNorm: 894795 2 Capsule(s) PO QD 2011 

Inactive                                 

 

           simvastatin 40 mg Tab RxNorm: 858125 1 Tablet(s) PO QD 2011 

Inactive                                 

 

             metoprolol tartrate 25 mg Tab RxNorm: 603851 1/2 Tablet(s) PO QD 10

/   

2012                Inactive                   

 

             metoprolol tartrate 25 mg Tab RxNorm: 528141 1/2 Tablet(s) PO QD 10

/   

10/24/2011                Inactive                   

 

           meclizine 25 mg Tab RxNorm: 2637717 1 Tablet(s) PO QHS 2011 

Inactive                                for dizziness

 

           Ceftin 500 mg Tab RxNorm: 375072 1 Tablet(s) PO BID 2011 

Inactive                                 

 

                omeprazole 20 mg Cap, Delayed Release RxNorm: 716940  1 Capsule(

s) PO BID 

2011          10/24/2011          Inactive             

 

           simvastatin 40 mg Tab RxNorm: 947344 1 Tablet(s) PO QD 2011 

Inactive                                 

 

           simvastatin 40 mg Tab RxNorm: 764892 1 Tablet(s) PO QD 2011 

Inactive                                 

 

           simvastatin 40 mg Tab RxNorm: 127072 1 Tablet(s) PO QD 2010 

Inactive                                 

 

             Tessalon Perles 100 mg Cap RxNorm: 655100 1 Capsule(s) PO Q6-8H 2010                Inactive                   

 

           cefdinir 300 mg Cap RxNorm: 723645 1 Capsule(s) PO BID 2010 

Inactive                                 

 

           Lexapro 10 mg Tab RxNorm: 369492 1 Tablet(s) PO QD 2010

010 

Inactive                                 

 

           Cipro 250 mg Tab RxNorm: 414488 1 Tablet(s) PO BID 2010 10/04/2

010 

Inactive                                 

 

             Wellbutrin  mg 24 hr Tab RxNorm: 465346 1 Tablet(s) PO QAM 2010                Inactive                   

 

          Fish Oil 1,000 mg Cap RxNorm:   1 Capsule(s) PO QD No Start Date      

     Active     

 

                Tylenol Extra Strength 500 mg tablet RxNorm: 321949  /2 Tablet(

s) PO as needed 

No Start Date                           Active               

 

                nitroglycerin 0.4 mg sublingual tablet RxNorm: 349272  Tablet(s)

 SL as needed No 

Start Date                              Active               

 

                    Calcium with Vitamin D 600 mg (1,500 mg)-400 unit tablet RxN

orm: 133235      1 

Tablet(s) PO QD No Start Date                   Active           

 

           Multivitamin & Mineral Formula Tab RxNorm:    1 Tablet(s) PO QD No St

art Date            

Active                                   

 

          vitamin B complex capsule RxNorm:   1 Capsule(s) PO QD No Start Date  

         Active     

 

          Aspirin 81 mg Tab RxNorm: 519815 1 Tablet(s) PO QD No Start Date      

     Active     

 

                    isosorbide mononitrate ER 30 mg tablet,extended release 24 h

r RxNorm: 529764      1 

Tablet(s) PO QHS No Start Date                   Active           

 

           Vitamin D 1,000 unit Cap RxNorm: 516130 1 Capsule(s) PO QD No Start D

ate            

Active                                   

 

          Co Q-10 oral RxNorm: 02669 oral      No Start Date           Active   

  

 

             metoprolol tartrate 25 mg Tab RxNorm: 735149 1/2 Tablet(s) PO QD No

 Start Date 

10/23/2011                Inactive                   

 

             Nasonex 50 mcg/actuation Spray RxNorm: 0397695 2 Spray NASAL No Sta

rt Date 

2018                Inactive                   

 

           Vitamin B12 1000mcg Tablet RxNorm:    1 Tablet(s) PO QD No Start Date

 2015 

Inactive                                 

 

           amlodipine 5 mg Tab RxNorm: 952791 1 Tablet(s) PO QD No Start Date  

Inactive                                 

 

             simvastatin 40 mg tablet RxNorm: 648067 1 Tablet(s) PO QD No Start 

Date 

2019                Inactive                   

 

                omeprazole 20 mg capsule,delayed release RxNorm: 438441  1 Capsu

le(s) PO BID No 

Start Date          2013          Inactive             

 

                omeprazole 40 mg capsule,delayed release RxNorm: 502146  1 Capsu

le(s) PO QD No 

Start Date          2019          Inactive             

 

           Nexium 40 mg Cap RxNorm: 694696 1 Capsule(s) PO QD No Start Date  

Inactive                                 

 

             Stool Softener 100 mg Tab RxNorm: 0595223 2 Tablet(s) PO BID No Sta

rt Date 

2012                Inactive                   

 

                    Calcium with Vitamin D 600 mg (1,500 mg)-400 unit Tab RxNorm

: 896733      1 Tablet(s)

PO QD           No Start Date   2015      Inactive         

 

             iron 325 mg (65 mg iron) Tab RxNorm: 636997 1 Tablet(s) PO QD No St

art Date 

2015                Inactive                   

 

                Flonase 50 mcg/actuation Nasal Spray RxNorm: 138727  2 [place] ROZ

AL BID No Start 

Date                2014          Inactive             

 

                    fluticasone 50 mcg/actuation nasal spray,suspension RxNorm: 

3720008     2 Spray 

NASAL QD to each nostril No Start Date   2018      Inactive         

 

             Nasonex 50 mcg/actuation Spray RxNorm: 0742502 2 Spray NASAL QD No 

Start Date 

2018                Inactive                   

 

                    hydralazine 50 mg tablet RxNorm: 718053      1 Tablet(s) PO 

as needed for BP over 

160/90          No Start Date   2018      Inactive         

 

             Vimovo 500 mg-20 mg 12 hr Tab RxNorm: 118282 1 Tablet(s) PO BID No 

Start Date 

2011                Inactive                   

 

             Tylenol PM 25 mg-500 mg/15 mL Oral Soln RxNorm: 5390490 1 PO QPM   

  No Start Date 

2015                Inactive                   

 

          Iron (Ferrous Sulfate) Oral RxNorm:   Oral      No Start Date 20

12 Inactive   

 

             Reglan 10 mg Tab RxNorm: 916138 1 Tablet(s) PO TID before meals No 

Start Date 

2011                Inactive                   

 

           Xanax 1 mg Tab RxNorm: 312915 1/2 Tablet(s) PO QD No Start Date  

Inactive                                 

 

             amlodipine 10 mg tablet RxNorm: 531337 1 Tablet(s) PO QD No Start D

ate 

2014                Inactive                   

 

          Iron (dried) Oral RxNorm:   Oral      No Start Date 2012 Inactiv

e   

 

                metoprolol tartrate 50 mg tablet RxNorm: 766161  1 Tablet(s) PO 

BID No Start Date

                    12/10/2018          Inactive             

 

           Xanax 0.25 mg tablet RxNorm: 957961 1 Tablet(s) PO BID No Start Date 

10/29/2018 

Inactive                                 

 

                lancets         RxNorm:         Miscellaneous check blood sugar 

at least once daily No Start 

Date                2013          Inactive             

 

           simvastatin 40 mg Tab RxNorm: 197567 1 Tablet(s) PO QD No Start Date 

2010 

Inactive                                 

 

                    isosorbide mononitrate ER 30 mg tablet,extended release 24 h

r RxNorm: 002899      1 

Tablet(s) PO QHS No Start Date   2019      Inactive         

 

             amlodipine 2.5 mg tablet RxNorm: 120050 1 Tablet(s) PO QD No Start 

Date 

2014                Inactive                   

 

             sucralfate 1 gram tablet RxNorm: 714824 1 Tablet(s) PO QID No Start

 Date 

2019                Inactive                   

 

          Fish Oil Oral RxNorm:   Oral      No Start Date 2012 Inactive   

 

          Multiple Vitamin Oral RxNorm:   Oral      No Start Date 2012 [place]

ctive   

 

                metoprolol tartrate 25 mg tablet RxNorm: 344115  1/2 Tablet(s) P

O QD No Start 

Date                2017          Inactive             

 

                metoprolol tartrate 50 mg tablet RxNorm: 612145  1/2 Tablet(s) P

O BID No Start 

Date                2019          Inactive             

 

                    Flonase Allergy Relief 50 mcg/actuation nasal spray,suspensi

on RxNorm: 6145355     2

[place] NASAL QHS No Start Date   2019      Inactive         







Medication Administered

No Medication Administered data



Immunizations





                Vaccine         Codes           Date            Status

 

                Influenza       CVX: 135        10/22/2019      Complete

 

                Influenza       CVX: 135        10/22/2019      Complete

 

                Influenza       CVX: 135        2018      Complete

 

                Influenza       CVX: 135        10/06/2017      Complete

 

                Influenza       CVX: 135        10/07/2016      Complete

 

                Influenza       CVX: 135        10/16/2015       

 

                Influenza       CVX: 141        2013       

 

                Influenza (Adult) CVX: 141        10/06/2010       







Results





          Observation Observation Code Item      Item Code Result    Date      S

[facility] Location

 

          COMPLETE BLOOD COUNT [id number]   WBC                 7.1 10e9/L 20

20 Unknown

 

          COMPLETE BLOOD COUNT [id number]   RBC                 4.16 10e12/L 2020 Unknown

 

          COMPLETE BLOOD COUNT [id number]   HEMOGLOBIN           12.4 g/dL 20

20 Unknown

 

          COMPLETE BLOOD COUNT [id number]   HEMATOCRIT           39.3 %    20

20 Unknown

 

          COMPLETE BLOOD COUNT [id number]   MCV                 94.5 fL   

0 Unknown

 

          COMPLETE BLOOD COUNT [id number]   MCH                 29.8 pg   

0 Unknown

 

          COMPLETE BLOOD COUNT [id number]   MCHC                31.6 g/dL 

0 Unknown

 

          COMPLETE BLOOD COUNT [id number]   PLATELET COUNT           161 10e9/L 2020 Unknown

 

          COMPLETE BLOOD COUNT [id number]   Mean Plt Volume           10.8 fL   2020 Unknown

 

          COMPLETE BLOOD COUNT [id number]   Neut Auto           38.7 %    

0 Unknown

 

          COMPLETE BLOOD COUNT [id number]   Lymph Auto           48.0 %    20

20 Unknown

 

          COMPLETE BLOOD COUNT [id number]   Mono Auto           9.5 %     

0 Unknown

 

          COMPLETE BLOOD COUNT [id number]   RDW                 13.5 %    

0 Unknown

 

          COMPLETE BLOOD COUNT [id number]   Eos Auto            3.2 %     

0 Unknown

 

          COMPLETE BLOOD COUNT [id number]   Baso Auto           0.6 %     

0 Unknown

 

          COMPLETE BLOOD COUNT [id number]   Neutrophil Abs           2.75 10e9/L  Unknown

 

          COMPLETE BLOOD COUNT [id number]   Lymphocyte Abs           3.41 10e9/L  Unknown

 

          COMPLETE BLOOD COUNT [id number]   Monocyte Abs           0.67 10e9/L 2020 Unknown

 

          COMPLETE BLOOD COUNT [id number]   Eosinophil Abs           0.23 10e9/L  Unknown

 

          COMPLETE BLOOD COUNT [id number]   RDW-SD              44.7 fL   

0 Unknown

 

          COMPLETE BLOOD COUNT [id number]   Basophil Abs           0.04 10e9/L 2020 Unknown

 

          THYROID STIMULATING HORMONE 14914     TSH                 1.677 uIU/mL

 2020 Unknown

 

          COMPREHENSIVE METABOLIC 19499     AST                 19 U/L    2020 Unknown

 

          COMPREHENSIVE METABOLIC 32784     ALT                 12 U/L    2020 Unknown

 

          COMPREHENSIVE METABOLIC 89799     BUN                 17 mg/dL  2020 Unknown

 

          COMPREHENSIVE METABOLIC 38543     ALBUMIN             4.2 g/dL  2020 Unknown

 

          COMPREHENSIVE METABOLIC 00594     CHLORIDE            103 mmol/L  Unknown

 

          COMPREHENSIVE METABOLIC 22287     Bili Total           0.2 mg/dL  Unknown

 

          COMPREHENSIVE METABOLIC 43178     ALK PHOS            61 U/L    2020 Unknown

 

          COMPREHENSIVE METABOLIC 95902     SODIUM              140 mmol/L  Unknown

 

          COMPREHENSIVE METABOLIC 53258     CREATININE           0.95 mg/dL 2020 Unknown

 

          COMPREHENSIVE METABOLIC 25495     CALCIUM             9.4 mg/dL 2020 Unknown

 

          COMPREHENSIVE METABOLIC 70301     POTASSIUM           4.2 mmol/L  Unknown

 

          COMPREHENSIVE METABOLIC 09447     Total Protein           6.5 g/dL   Unknown

 

          COMPREHENSIVE METABOLIC 20891     Glucose             103 mg/dL 2020 Unknown

 

          COMPREHENSIVE METABOLIC 65603     Bicarbonate           26 mmol/L 2020 Unknown

 

          COMPREHENSIVE METABOLIC 42483     AGAP                11 mmol/L 2020 Unknown

 

          GFR CALC  9666895   GFR Non Afr Amr           57 mL/min 2020 Unk

nown

 

          GFR CALC  5330582   GFR Afr Amr           >60 mL/min 2020 Unknow

n

 

          GFR CALC  8576944   GFR Non Afr Amr           52 mL/min 10/11/2019 Unk

nown

 

          GFR CALC  4219132   GFR Afr Amr           >60 mL/min 10/11/2019 Unknow

n

 

          COMPREHENSIVE METABOLIC 92890     AST                 17 U/L    10/11/

2019 Unknown

 

          COMPREHENSIVE METABOLIC 55839     ALT                 11 U/L    10/11/

2019 Unknown

 

          COMPREHENSIVE METABOLIC 95251     BUN                 17 mg/dL  10/11/

2019 Unknown

 

          COMPREHENSIVE METABOLIC 66320     ALBUMIN             3.9 g/dL  10/11/

2019 Unknown

 

          COMPREHENSIVE METABOLIC 29474     CHLORIDE            108 mmol/L 10/11

/2019 Unknown

 

          COMPREHENSIVE METABOLIC 11808     Bili Total           0.5 mg/dL 10/11

/2019 Unknown

 

          COMPREHENSIVE METABOLIC 01417     ALK PHOS            72 U/L    10/11/

2019 Unknown

 

          COMPREHENSIVE METABOLIC 30628     SODIUM              142 mmol/L 10/11

/2019 Unknown

 

          COMPREHENSIVE METABOLIC 10453     CREATININE           1.02 mg/dL 10/1

2019 Unknown

 

          COMPREHENSIVE METABOLIC 51552     CALCIUM             9.3 mg/dL 10/11/

2019 Unknown

 

          COMPREHENSIVE METABOLIC 98082     POTASSIUM           4.0 mmol/L 10/11

/2019 Unknown

 

          COMPREHENSIVE METABOLIC 03662     Total Protein           6.2 g/dL  10

/2019 Unknown

 

          COMPREHENSIVE METABOLIC 77145     Glucose             93 mg/dL  10/11/

2019 Unknown

 

          COMPREHENSIVE METABOLIC 86112     Bicarbonate           27 mmol/L 10/1

2019 Unknown

 

          COMPREHENSIVE METABOLIC 43693     AGAP                7 mmol/L  10/11/

2019 Unknown

 

          GFR CALC  9264890   GFR Non Afr Amr           48 mL/min 06/10/2019 Unk

nown

 

          GFR CALC  0737062   GFR Afr Amr           59 mL/min 06/10/2019 Unknown

 

          THYROID STIMULATING HORMONE 35158     TSH                 1.936 uIU/mL

 06/10/2019 Unknown

 

          COMPREHENSIVE METABOLIC 21662     AST                 18 U/L    06/10/

2019 Unknown

 

          COMPREHENSIVE METABOLIC 13817     ALT                 11 U/L    06/10/

2019 Unknown

 

          COMPREHENSIVE METABOLIC 02802     BUN                 18 mg/dL  06/10/

2019 Unknown

 

          COMPREHENSIVE METABOLIC 20979     ALBUMIN             4.2 g/dL  06/10/

2019 Unknown

 

          COMPREHENSIVE METABOLIC 32132     CHLORIDE            109 mmol/L 06/10

/2019 Unknown

 

          COMPREHENSIVE METABOLIC 66365     Bili Total           0.4 mg/dL 06/10

/2019 Unknown

 

          COMPREHENSIVE METABOLIC 91352     ALK PHOS            64 U/L    06/10/

2019 Unknown

 

          COMPREHENSIVE METABOLIC 16071     SODIUM              142 mmol/L 06/10

/2019 Unknown

 

          COMPREHENSIVE METABOLIC 86663     CREATININE           1.09 mg/dL  Unknown

 

          COMPREHENSIVE METABOLIC 67150     CALCIUM             9.3 mg/dL 06/10/

2019 Unknown

 

          COMPREHENSIVE METABOLIC 40244     POTASSIUM           4.7 mmol/L 06/10

/2019 Unknown

 

          COMPREHENSIVE METABOLIC 41876     Total Protein           6.3 g/dL  06

/10/2019 Unknown

 

          COMPREHENSIVE METABOLIC 95547     Glucose             84 mg/dL  06/10/

2019 Unknown

 

          COMPREHENSIVE METABOLIC 62809     Bicarbonate           25 mmol/L  Unknown

 

          COMPREHENSIVE METABOLIC 98193     AGAP                8 mmol/L  06/10/

2019 Unknown

 

          COMPLETE BLOOD COUNT [id number]   WBC                 7.8 10e9/L 06/10/20

19 Unknown

 

          COMPLETE BLOOD COUNT [id number]   RBC                 4.04 10e12/L 06/10/

2019 Unknown

 

          COMPLETE BLOOD COUNT [id number]   HEMOGLOBIN           12.2 g/dL 06/10/20

19 Unknown

 

          COMPLETE BLOOD COUNT [id number]   HEMATOCRIT           38.6 %    06/10/20

19 Unknown

 

          COMPLETE BLOOD COUNT [id number]   MCV                 95.5 fL   06/10/201

9 Unknown

 

          COMPLETE BLOOD COUNT [id number]   MCH                 30.2 pg   06/10/201

9 Unknown

 

          COMPLETE BLOOD COUNT [id number]   MCHC                31.6 g/dL 06/10/201

9 Unknown

 

          COMPLETE BLOOD COUNT [id number]   PLATELET COUNT           215 10e9/L 06/

10/2019 Unknown

 

          COMPLETE BLOOD COUNT [id number]   Mean Plt Volume           11.2 fL   06/

10/2019 Unknown

 

          COMPLETE BLOOD COUNT [id number]   Neut Auto           45.7 %    06/10/201

9 Unknown

 

          COMPLETE BLOOD COUNT [id number]   Lymph Auto           42.1 %    06/10/20

19 Unknown

 

          COMPLETE BLOOD COUNT [id number]   Mono Auto           9.2 %     06/10/201

9 Unknown

 

          COMPLETE BLOOD COUNT [id number]   RDW                 13.4 %    06/10/201

9 Unknown

 

          COMPLETE BLOOD COUNT [id number]   Eos Auto            2.7 %     06/10/201

9 Unknown

 

          COMPLETE BLOOD COUNT [id number]   Baso Auto           0.3 %     06/10/201

9 Unknown

 

          COMPLETE BLOOD COUNT [id number]   Neutrophil Abs           3.56 10e9/L 06

/10/2019 Unknown

 

          COMPLETE BLOOD COUNT [id number]   Lymphocyte Abs           3.28 10e9/L 06

/10/2019 Unknown

 

          COMPLETE BLOOD COUNT [id number]   Monocyte Abs           0.72 10e9/L  Unknown

 

          COMPLETE BLOOD COUNT [id number]   Eosinophil Abs           0.21 10e9/L 06

/10/2019 Unknown

 

          COMPLETE BLOOD COUNT [id number]   RDW-SD              44.9 fL   06/10/201

9 Unknown

 

          COMPLETE BLOOD COUNT [id number]   Basophil Abs           0.02 10e9/L  Unknown

 

          COMPLETE BLOOD COUNT [id number]   WBC                 5.2 10e9/L 20

18 Unknown

 

          COMPLETE BLOOD COUNT [id number]   RBC                 4.21 10e12/L 2018 Unknown

 

          COMPLETE BLOOD COUNT [id number]   HEMOGLOBIN           12.8 g/dL 20

18 Unknown

 

          COMPLETE BLOOD COUNT [id number]   HEMATOCRIT           39.0 %    20

18 Unknown

 

          COMPLETE BLOOD COUNT [id number]   MCV                 92.6 fL   

8 Unknown

 

          COMPLETE BLOOD COUNT [id number]   MCH                 30.4 pg   

8 Unknown

 

          COMPLETE BLOOD COUNT [id number]   MCHC                32.8 g/dL 

8 Unknown

 

          COMPLETE BLOOD COUNT [id number]   PLATELET COUNT           202 10e9/L  Unknown

 

          COMPLETE BLOOD COUNT [id number]   Mean Plt Volume           10.7 fL    Unknown

 

          COMPLETE BLOOD COUNT [id number]   Neut Auto           40.9 %    

8 Unknown

 

          COMPLETE BLOOD COUNT [id number]   Lymph Auto           46.3 %    20

18 Unknown

 

          COMPLETE BLOOD COUNT [id number]   Mono Auto           9.3 %     

8 Unknown

 

          COMPLETE BLOOD COUNT [id number]   RDW                 13.7 %    

8 Unknown

 

          COMPLETE BLOOD COUNT [id number]   Eos Auto            2.9 %     

8 Unknown

 

          COMPLETE BLOOD COUNT [id number]   Baso Auto           0.6 %     

8 Unknown

 

          COMPLETE BLOOD COUNT [id number]   Neutrophil Abs           2.13 10e9/L  Unknown

 

          COMPLETE BLOOD COUNT [id number]   Lymphocyte Abs           2.41 10e9/L  Unknown

 

          COMPLETE BLOOD COUNT [id number]   Monocyte Abs           0.48 10e9/L  Unknown

 

          COMPLETE BLOOD COUNT [id number]   Eosinophil Abs           0.15 10e9/L  Unknown

 

          COMPLETE BLOOD COUNT [id number]   RDW-SD              45.2 fL   

8 Unknown

 

          COMPLETE BLOOD COUNT [id number]   Basophil Abs           0.03 10e9/L  Unknown

 

          [place] [address]     Glucose             118 mg/dL  Unknown

 

          [place] [address]     CREATININE           1.01 mg/dL  Unknown

 

          [place] [address]     BUN                 14 mg/dL   Unknown

 

          [place] [address]     SODIUM              141 mmol/L  Unknown

 

          [place] [address]     POTASSIUM           4.0 mmol/L  Unknown

 

          [place] [address]     CHLORIDE            108 mmol/L  Unknown

 

          [place] [address]     Bicarbonate           25 mmol/L  Unknown

 

          [place] [address]     AGAP                8 mmol/L   Unknown

 

          [place] [address]     CALCIUM             9.6 mg/dL  Unknown

 

          FREE T4   99334     T4 Free             1.23 ng/dL 2018 Unknown

 

          GFR CALC  6379913   GFR Non Afr Amr           53 mL/min 2018 Unk

nown

 

          GFR CALC  2797148   GFR Afr Amr           >60 mL/min 2018 Unknow

n

 

          THYROID STIMULATING HORMONE 25188     TSH                 2.124 uIU/mL

 2018 Unknown

 

          LIPID GROUP 09102     Cholesterol           152 mg/dL 2018 Unkno

wn

 

          LIPID GROUP 27564     Triglyceride           151 mg/dL 2018 Unkn

own

 

          LIPID GROUP [id number]     HDL CHOLESTEROL           47 mg/dL  2018 U

nknown

 

          LIPID GROUP 01959     Chol/HDL Ratio           3.23 ratio 2018 U

nknown

 

          LIPID GROUP 79057     NON-HDL Chol           105 mg/dL 2018 Unkn

own

 

          LIPID GROUP 89413     LDL Cholesterol           75 mg/dL  2018 U

nknown

 

          ASSAY OF TROPONIN QUANT 76165     Troponin-I           <0.30 ng/mL  Unknown

 

          COMPREHENSIVE METABOLIC 02785     AST                 20 U/L    2018 Unknown

 

          COMPREHENSIVE METABOLIC 86778     ALT                 14 U/L    2018 Unknown

 

          COMPREHENSIVE METABOLIC 53770     BUN                 19 mg/dL  2018 Unknown

 

          COMPREHENSIVE METABOLIC 23602     ALBUMIN             4.2 g/dL  2018 Unknown

 

          COMPREHENSIVE METABOLIC 62327     CHLORIDE            102 mmol/L  Unknown

 

          COMPREHENSIVE METABOLIC 63313     Bili Total           0.4 mg/dL  Unknown

 

          COMPREHENSIVE METABOLIC 89431     ALK PHOS            66 U/L    2018 Unknown

 

          COMPREHENSIVE METABOLIC 95406     SODIUM              135 mmol/L  Unknown

 

          COMPREHENSIVE METABOLIC 54334     CREATININE           1.01 mg/dL  Unknown

 

          COMPREHENSIVE METABOLIC 42787     CALCIUM             9.3 mg/dL 2018 Unknown

 

          COMPREHENSIVE METABOLIC 07543     POTASSIUM           4.8 mmol/L  Unknown

 

          COMPREHENSIVE METABOLIC 59980     Total Protein           7.0 g/dL   Unknown

 

          COMPREHENSIVE METABOLIC 98347     Glucose             91 mg/dL  2018 Unknown

 

          COMPREHENSIVE METABOLIC 98073     Bicarbonate           23 mmol/L  Unknown

 

          COMPREHENSIVE METABOLIC 61815     AGAP                10 mmol/L 2018 Unknown

 

          COMPLETE BLOOD COUNT [id number]   WBC                 7.5 10e9/L 20

18 Unknown

 

          COMPLETE BLOOD COUNT [id number]   RBC                 4.13 10e12/L 2018 Unknown

 

          COMPLETE BLOOD COUNT [id number]   HEMOGLOBIN           12.6 g/dL 20

18 Unknown

 

          COMPLETE BLOOD COUNT [id number]   HEMATOCRIT           38.4 %    20

18 Unknown

 

          COMPLETE BLOOD COUNT [id number]   MCV                 93.0 fL   

8 Unknown

 

          COMPLETE BLOOD COUNT [id number]   MCH                 30.5 pg   

8 Unknown

 

          COMPLETE BLOOD COUNT [id number]   MCHC                32.8 g/dL 

8 Unknown

 

          COMPLETE BLOOD COUNT [id number]   PLATELET COUNT           204 10e9/L  Unknown

 

          COMPLETE BLOOD COUNT [id number]   Mean Plt Volume           10.9 fL    Unknown

 

          COMPLETE BLOOD COUNT [id number]   Neut Auto           43.1 %    

8 Unknown

 

          COMPLETE BLOOD COUNT [id number]   Lymph Auto           45.0 %    20

18 Unknown

 

          COMPLETE BLOOD COUNT [id number]   Mono Auto           9.2 %     

8 Unknown

 

          COMPLETE BLOOD COUNT [id number]   RDW                 13.4 %    

8 Unknown

 

          COMPLETE BLOOD COUNT [id number]   Eos Auto            2.3 %     

8 Unknown

 

          COMPLETE BLOOD COUNT [id number]   Baso Auto           0.4 %     

8 Unknown

 

          COMPLETE BLOOD COUNT [id number]   Neutrophil Abs           3.23 10e9/L  Unknown

 

          COMPLETE BLOOD COUNT [id number]   Lymphocyte Abs           3.38 10e9/L  Unknown

 

          COMPLETE BLOOD COUNT [id number]   Monocyte Abs           0.69 10e9/L  Unknown

 

          COMPLETE BLOOD COUNT [id number]   Eosinophil Abs           0.17 10e9/L  Unknown

 

          COMPLETE BLOOD COUNT [id number]   RDW-SD              44.4 fL   

8 Unknown

 

          COMPLETE BLOOD COUNT [id number]   Basophil Abs           0.03 10e9/L  Unknown

 

          GFR CALC  0453966   GFR Non Afr Amr           53 mL/min 2018 Unk

nown

 

          GFR CALC  0245626   GFR Afr Amr           >60 mL/min 2018 Unknow

n

 

          GLYCOSYLATED HEMOGLOBIN TEST 56364     Hgb A1c   02766-2   5.4 %     0

2018 Unknown

 

          MEAN GLUC 8463445   Calc Mean Gluc           108 mg/dL 2018 Unkn

own

 

          MEAN GLUC [id number]   Calc Mean Gluc           114 mg/dL 2017 Unkn

own

 

          LIPID GROUP 60133     Cholesterol           146 mg/dL 2017 Unkno

wn

 

          LIPID GROUP 85195     Triglyceride           119 mg/dL 2017 Unkn

own

 

          LIPID GROUP 50070     HDL CHOLESTEROL           47 mg/dL  2017 U

nknown

 

          LIPID GROUP 46003     Chol/HDL Ratio           3.11 ratio 2017 U

nknown

 

          LIPID GROUP 24280     NON-HDL Chol           99 mg/dL  2017 Unkn

own

 

          LIPID GROUP 38579     LDL Cholesterol           75 mg/dL  2017 U

nknown

 

          GLYCOSYLATED HEMOGLOBIN TEST 23188     Hgb A1c   90551-0   5.6 %     0

2017 Unknown

 

          COMPREHENSIVE METABOLIC 79374     AST                 22 U/L    2017 Unknown

 

          COMPREHENSIVE METABOLIC 15274     ALT                 12 U/L    2017 Unknown

 

          COMPREHENSIVE METABOLIC 64663     BUN                 17 mg/dL  2017 Unknown

 

          COMPREHENSIVE METABOLIC 72282     ALBUMIN             4.0 g/dL  2017 Unknown

 

          COMPREHENSIVE METABOLIC 34455     CHLORIDE            110 mmol/L  Unknown

 

          COMPREHENSIVE METABOLIC 79233     Bili Total           0.4 mg/dL  Unknown

 

          COMPREHENSIVE METABOLIC 65431     ALK PHOS            63 U/L    2017 Unknown

 

          COMPREHENSIVE METABOLIC 12484     SODIUM              140 mmol/L  Unknown

 

          COMPREHENSIVE METABOLIC 66031     CREATININE           1.05 mg/dL  Unknown

 

          COMPREHENSIVE METABOLIC 33980     CALCIUM             9.2 mg/dL 2017 Unknown

 

          COMPREHENSIVE METABOLIC 30736     POTASSIUM           4.2 mmol/L  Unknown

 

          COMPREHENSIVE METABOLIC 34527     Total Protein           6.2 g/dL   Unknown

 

          COMPREHENSIVE METABOLIC 25781     Glucose             87 mg/dL  2017 Unknown

 

          COMPREHENSIVE METABOLIC 53632     Bicarbonate           24 mmol/L  Unknown

 

          COMPREHENSIVE METABOLIC 69127     AGAP                6 mmol/L  2017 Unknown

 

          GFR CALC  2949284   GFR Non Afr Amr           51 mL/min 2017 Unk

nown

 

          GFR CALC  0355304   GFR Afr Amr           >60 mL/min 2017 Unknow

n

 

          COMPLETE BLOOD COUNT [id number]   WBC                 6.7 10e9/L 20

17 Unknown

 

          COMPLETE BLOOD COUNT [id number]   RBC                 4.04 10e12/L 2017 Unknown

 

          COMPLETE BLOOD COUNT [id number]   HEMOGLOBIN           12.1 g/dL 20

17 Unknown

 

          COMPLETE BLOOD COUNT [id number]   HEMATOCRIT           38.0 %    20

17 Unknown

 

          COMPLETE BLOOD COUNT [id number]   MCV                 94.1 fL   

7 Unknown

 

          COMPLETE BLOOD COUNT [id number]   MCH                 30.0 pg   

7 Unknown

 

          COMPLETE BLOOD COUNT 9697626   MCHC                31.8 g/dL 

7 Unknown

 

          COMPLETE BLOOD COUNT [id number]   PLATELET COUNT           206 10e9/L  Unknown

 

          COMPLETE BLOOD COUNT [id number]   Mean Plt Volume           11.3 fL    Unknown

 

          COMPLETE BLOOD COUNT 8093282   Neut Auto           35.8 %    

7 Unknown

 

          COMPLETE BLOOD COUNT 0155950   Lymph Auto           51.6 %    20

17 Unknown

 

          COMPLETE BLOOD COUNT [id number]   Mono Auto           8.8 %     

7 Unknown

 

          COMPLETE BLOOD COUNT [id number]   RDW                 13.5 %    

7 Unknown

 

          COMPLETE BLOOD COUNT [id number]   Eos Auto            3.4 %     

7 Unknown

 

          COMPLETE BLOOD COUNT [id number]   Baso Auto           0.4 %     

7 Unknown

 

          COMPLETE BLOOD COUNT [id number]   Neutrophil Abs           2.40 10e9/L  Unknown

 

          COMPLETE BLOOD COUNT [id number]   Lymphocyte Abs           3.46 10e9/L  Unknown

 

          COMPLETE BLOOD COUNT [id number]   Monocyte Abs           0.59 10e9/L  Unknown

 

          COMPLETE BLOOD COUNT [id number]   Eosinophil Abs           0.23 10e9/L  Unknown

 

          COMPLETE BLOOD COUNT [id number]   RDW-SD              45.3 fL   

7 Unknown

 

          COMPLETE BLOOD COUNT [id number]   Basophil Abs           0.03 10e9/L  Unknown

 

          THYROID STIMULATING HORMONE 33300     TSH                 1.981 uIU/mL

 2017 Unknown

 

          COMPLETE BLOOD COUNT [id number]   WBC                 6.0 10e9/L 20

17 Unknown

 

          COMPLETE BLOOD COUNT [id number]   RBC                 4.29 10e12/L 2017 Unknown

 

          COMPLETE BLOOD COUNT [id number]   HEMOGLOBIN           12.9 g/dL 20

17 Unknown

 

          COMPLETE BLOOD COUNT [id number]   HEMATOCRIT           38.4 %    20

17 Unknown

 

          COMPLETE BLOOD COUNT [id number]   MCV                 89.5 fL   

7 Unknown

 

          COMPLETE BLOOD COUNT [id number]   MCH                 30.1 pg   

7 Unknown

 

          COMPLETE BLOOD COUNT [id number]   MCHC                33.6 g/dL 

7 Unknown

 

          COMPLETE BLOOD COUNT [id number]   PLATELET COUNT           181 10e9/L 2017 Unknown

 

          COMPLETE BLOOD COUNT [id number]   Mean Plt Volume           11.7 fL   2017 Unknown

 

          COMPLETE BLOOD COUNT [id number]   Neut Auto           36.9 %    

7 Unknown

 

          COMPLETE BLOOD COUNT [id number]   Lymph Auto           50.4 %    20

17 Unknown

 

          COMPLETE BLOOD COUNT [id number]   Mono Auto           9.0 %     

7 Unknown

 

          COMPLETE BLOOD COUNT [id number]   RDW                 13.7 %    

7 Unknown

 

          COMPLETE BLOOD COUNT [id number]   Eos Auto            3.4 %     

7 Unknown

 

          COMPLETE BLOOD COUNT [id number]   Baso Auto           0.3 %     

7 Unknown

 

          COMPLETE BLOOD COUNT [id number]   Neutrophil Abs           2.21 10e9/L  Unknown

 

          COMPLETE BLOOD COUNT 9057893   Lymphocyte Abs           3.02 10e9/L  Unknown

 

          COMPLETE BLOOD COUNT [id number]   Monocyte Abs           0.54 10e9/L 2017 Unknown

 

          COMPLETE BLOOD COUNT [id number]   Eosinophil Abs           0.20 10e9/L  Unknown

 

          COMPLETE BLOOD COUNT [id number]   RDW-SD              44.0 fL   

7 Unknown

 

          COMPLETE BLOOD COUNT [id number]   Basophil Abs           0.02 10e9/L 2017 Unknown

 

          GLYCOSYLATED HEMOGLOBIN TEST 09075     Hgb A1c   97931-2   5.4 %     0

3/09/2017 Unknown

 

          THYROID STIMULATING HORMONE 06119     TSH                 2.200 uIU/mL

 2017 Unknown

 

          GFR CALC  6195872   GFR Non Afr Amr           50 mL/min 2017 Unk

nown

 

          GFR CALC  2989825   GFR Afr Amr           >60 mL/min 2017 Unknow

n

 

          MEAN GLUC 3283011   Calc Mean Gluc           108 mg/dL 2017 Unkn

own

 

          COMPREHENSIVE METABOLIC 97926     AST                 18 U/L    2017 Unknown

 

          COMPREHENSIVE METABOLIC 13486     ALT                 10 U/L    2017 Unknown

 

          COMPREHENSIVE METABOLIC 88049     BUN                 20 mg/dL  2017 Unknown

 

          COMPREHENSIVE METABOLIC 89982     ALBUMIN             4.1 g/dL  2017 Unknown

 

          COMPREHENSIVE METABOLIC 58204     CHLORIDE            109 mmol/L  Unknown

 

          COMPREHENSIVE METABOLIC 92694     Bili Total           0.6 mg/dL  Unknown

 

          COMPREHENSIVE METABOLIC 62973     ALK PHOS            64 U/L    2017 Unknown

 

          COMPREHENSIVE METABOLIC 78182     SODIUM              141 mmol/L  Unknown

 

          COMPREHENSIVE METABOLIC 23840     CREATININE           1.06 mg/dL 2017 Unknown

 

          COMPREHENSIVE METABOLIC 11954     CALCIUM             9.9 mg/dL 2017 Unknown

 

          COMPREHENSIVE METABOLIC 47376     POTASSIUM           4.2 mmol/L  Unknown

 

          COMPREHENSIVE METABOLIC 13330     Total Protein           6.3 g/dL   Unknown

 

          COMPREHENSIVE METABOLIC 05056     Glucose             99 mg/dL  2017 Unknown

 

          COMPREHENSIVE METABOLIC 80983     Bicarbonate           21 mmol/L 2017 Unknown

 

          COMPREHENSIVE METABOLIC 12940     AGAP                11 mmol/L 2017 Unknown

 

          LIPID GROUP 81700     Cholesterol           169 mg/dL 2016 Unkno

wn

 

          LIPID GROUP 11918     Triglyceride           165 mg/dL 2016 Unkn

own

 

          LIPID GROUP 66083     HDL CHOLESTEROL           43 mg/dL  2016 U

nknown

 

          LIPID GROUP 71050     Chol/HDL Ratio           3.93 ratio 2016 U

nknown

 

          LIPID GROUP 40674     NON-HDL Chol           126 mg/dL 2016 Unkn

own

 

          LIPID GROUP 08516     LDL Cholesterol           93 mg/dL  2016 U

nknown

 

          COMPREHENSIVE METABOLIC 63235     AST                 18 U/L    2016 Unknown

 

          COMPREHENSIVE METABOLIC 53742     ALT                 10 U/L    2016 Unknown

 

          COMPREHENSIVE METABOLIC 90200     BUN                 20 mg/dL  2016 Unknown

 

          COMPREHENSIVE METABOLIC 12505     ALBUMIN             3.9 g/dL  2016 Unknown

 

          COMPREHENSIVE METABOLIC 85705     CHLORIDE            110 mmol/L  Unknown

 

          COMPREHENSIVE METABOLIC 49536     Bili Total           0.5 mg/dL  Unknown

 

          COMPREHENSIVE METABOLIC 35670     ALK PHOS            72 U/L    2016 Unknown

 

          COMPREHENSIVE METABOLIC 49175     SODIUM              141 mmol/L  Unknown

 

          COMPREHENSIVE METABOLIC 52592     CREATININE           1.12 mg/dL  Unknown

 

          COMPREHENSIVE METABOLIC 96545     CALCIUM             9.7 mg/dL 2016 Unknown

 

          COMPREHENSIVE METABOLIC 39282     POTASSIUM           4.4 mmol/L  Unknown

 

          COMPREHENSIVE METABOLIC 21264     Total Protein           6.2 g/dL   Unknown

 

          COMPREHENSIVE METABOLIC 93113     Glucose             90 mg/dL  2016 Unknown

 

          COMPREHENSIVE METABOLIC 71412     Bicarbonate           23 mmol/L  Unknown

 

          COMPREHENSIVE METABOLIC 97953     AGAP                8 mmol/L  2016 Unknown

 

          GFR CALC  6109276   GFR Non Afr Amr           47 mL/min 2016 Unk

nown

 

          GFR CALC  5528249   GFR Afr Amr           57 mL/min 2016 Unknown

 

          GLYCOSYLATED HEMOGLOBIN TEST 58375     Hgb A1c   87062-4   5.5 %     0

2016 Unknown

 

          THYROID STIMULATING HORMONE 84495     TSH                 2.537 uIU/mL

 2016 Unknown

 

          FREE T4   83704     T4 Free             1.36 ng/dL 2016 Unknown

 

          COMPLETE BLOOD COUNT 0942970   WBC                 6.8 10e9/L 20

16 Unknown

 

          COMPLETE BLOOD COUNT [id number]   RBC                 4.20 10e12/L 2016 Unknown

 

          COMPLETE BLOOD COUNT [id number]   HEMOGLOBIN           12.5 g/dL 20

16 Unknown

 

          COMPLETE BLOOD COUNT 5911237   HEMATOCRIT           38.0 %    20

16 Unknown

 

          COMPLETE BLOOD COUNT [id number]   MCV                 90.5 fL   

6 Unknown

 

          COMPLETE BLOOD COUNT [id number]   MCH                 29.8 pg   

6 Unknown

 

          COMPLETE BLOOD COUNT [id number]   MCHC                32.9 g/dL 

6 Unknown

 

          COMPLETE BLOOD COUNT 2753027   PLATELET COUNT           197 10e9/L  Unknown

 

          COMPLETE BLOOD COUNT [id number]   Mean Plt Volume           11.7 fL    Unknown

 

          COMPLETE BLOOD COUNT [id number]   Neut Auto           41.3 %    

6 Unknown

 

          COMPLETE BLOOD COUNT [id number]   Lymph Auto           47.1 %    20

16 Unknown

 

          COMPLETE BLOOD COUNT [id number]   Mono Auto           7.8 %     

6 Unknown

 

          COMPLETE BLOOD COUNT [id number]   RDW                 13.8 %    

6 Unknown

 

          COMPLETE BLOOD COUNT [id number]   Eos Auto            3.4 %     

6 Unknown

 

          COMPLETE BLOOD COUNT 7878214   Baso Auto           0.4 %     

6 Unknown

 

          COMPLETE BLOOD COUNT [id number]   Neutrophil Abs           2.81 10e9/L  Unknown

 

          COMPLETE BLOOD COUNT [id number]   Lymphocyte Abs           3.20 10e9/L  Unknown

 

          COMPLETE BLOOD COUNT [id number]   Monocyte Abs           0.53 10e9/L  Unknown

 

          COMPLETE BLOOD COUNT [id number]   Eosinophil Abs           0.23 10e9/L  Unknown

 

          COMPLETE BLOOD COUNT [id number]   RDW-SD              44.4 fL   

6 Unknown

 

          COMPLETE BLOOD COUNT [id number]   Basophil Abs           0.03 10e9/L  Unknown

 

          MEAN GLUC 5145735   Calc Mean Gluc           111 mg/dL 2016 Unkn

own

 

          [place] [address]     Glucose             89 MG/DL   Unknown

 

          [place] [address]     CREATININE           1.12 MG/DL  Unknown

 

          [place] [address]     BUN                 20 MG/DL   Unknown

 

          [place] [address]     SODIUM              139 MMOL/L  Unknown

 

          [place] [address]     POTASSIUM           4.6 MMOL/L  Unknown

 

          [place] [address]     CHLORIDE            108 MMOL/L  Unknown

 

          [place] [address]     BICARB              26 MMOL/L  Unknown

 

          [place] [address]     ANION GAP           5 MEQ/L    Unknown

 

          [place] [address]     CALCIUM             10.0 MG/DL  Unknown

 

          GFR CALC  0004167   GFR AA              57.0L ML/MIN 2015 Unknow

n

 

          GFR CALC  0903007   GFR NON-AA           47.0L ML/MIN 2015 Unkno

wn

 

          THYROID STIMULATING HORMONE 80626     TSH                 2.378 uIU/ML

 09/10/2015 Unknown

 

          COMPLETE BLOOD COUNT [id number]   WBC                 6.4 10e9/L 09/10/20

15 Unknown

 

          COMPLETE BLOOD COUNT [id number]   RBC                 3.99 10e12/L 09/10/

2015 Unknown

 

          COMPLETE BLOOD COUNT [id number]   HGB                 11.9 g/dL 09/10/201

5 Unknown

 

          COMPLETE BLOOD COUNT [id number]   HCT DET             36.9 %    09/10/201

5 Unknown

 

          COMPLETE BLOOD COUNT [id number]   MCV                 92.5 fL   09/10/201

5 Unknown

 

          COMPLETE BLOOD COUNT [id number]   MCH                 29.8 pg   09/10/201

5 Unknown

 

          COMPLETE BLOOD COUNT [id number]   MCHC                32.2 g/dL 09/10/201

5 Unknown

 

          COMPLETE BLOOD COUNT [id number]   PLT                 172 10e9/L 09/10/20

15 Unknown

 

          COMPLETE BLOOD COUNT [id number]   MPV                 11.7 fL   09/10/201

5 Unknown

 

          COMPLETE BLOOD COUNT [id number]   ARIK %               40.4 %    09/10/201

5 Unknown

 

          COMPLETE BLOOD COUNT [id number]   LY %                48.0 %    09/10/201

5 Unknown

 

          COMPLETE BLOOD COUNT 7572717   MON %               8.3 %     09/10/201

5 Unknown

 

          COMPLETE BLOOD COUNT [id number]   EOS  %              2.8 %     09/10/201

5 Unknown

 

          COMPLETE BLOOD COUNT [id number]   BASO %              0.5 %     09/10/201

5 Unknown

 

          COMPLETE BLOOD COUNT [id number]   RDW                 13.6 %    09/10/201

5 Unknown

 

          COMPLETE BLOOD COUNT [id number]   ABS ARIK             2.59 10e9/L 09/10/2

015 Unknown

 

          COMPLETE BLOOD COUNT [id number]   ABS LYMPH           3.07 10e9/L 09/10/2

015 Unknown

 

          COMPLETE BLOOD COUNT [id number]   ABS MONO            0.53 10e9/L 09/10/2

015 Unknown

 

          COMPLETE BLOOD COUNT [id number]   ABS EOS             0.18 10e9/L 09/10/2

015 Unknown

 

          COMPLETE BLOOD COUNT [id number]   ABS BASO            0.03 10e9/L 09/10/2

015 Unknown

 

          COMPLETE BLOOD COUNT [id number]   RDW-SD              44.9 fL   09/10/201

5 Unknown

 

          LIPID GROUP 75383     HDL TEST            42 MG/DL  09/10/2015 Unknown

 

          LIPID GROUP 15109     TRIG                177 MG/DL 09/10/2015 Unknown

 

          LIPID GROUP 87613     TEST LDL            72 MG/DL  09/10/2015 Unknown

 

          LIPID GROUP 92483     CHOL                149 MG/DL 09/10/2015 Unknown

 

          LIPID GROUP 93869     RCHOL/HDL           3.55 RATIO 09/10/2015 Unknow

n

 

          LIPID GROUP 71604     NON-HDL CH           107 MG/DL 09/10/2015 Unknow

n

 

          GLYCOSYLATED HEMOGLOBIN TEST 58993     A1C HPLC  28440-8   5.5 %     0

9/10/2015 Unknown

 

          FREE T4   54831     FREE T4             1.39 NG/DL 09/10/2015 Unknown

 

          GFR CALC  0590067   GFR AA              55.0L ML/MIN 09/10/2015 Unknow

n

 

          GFR CALC  6824679   GFR NON-AA           46.0L ML/MIN 09/10/2015 Unkno

wn

 

          COMPREHENSIVE METABOLIC 32139     AST                 17 U/L    09/10/

2015 Unknown

 

          COMPREHENSIVE METABOLIC 78602     ALT                 10 IU/L   09/10/

2015 Unknown

 

          COMPREHENSIVE METABOLIC 05569     BUN                 20 MG/DL  09/10/

2015 Unknown

 

          COMPREHENSIVE METABOLIC 34836     ALBUMIN             3.9 GM/DL 09/10/

2015 Unknown

 

          COMPREHENSIVE METABOLIC 37120     CHLORIDE            111 MMOL/L 09/10

/2015 Unknown

 

          COMPREHENSIVE METABOLIC 03350     BILI TOT            0.4 MG/DL 09/10/

2015 Unknown

 

          COMPREHENSIVE METABOLIC 86139     ALK PHOS            70 U/L    09/10/

2015 Unknown

 

          COMPREHENSIVE METABOLIC 66005     SODIUM              142 MMOL/L 09/10

/2015 Unknown

 

          COMPREHENSIVE METABOLIC 65161     CREATININE           1.16 MG/DL  Unknown

 

          COMPREHENSIVE METABOLIC 38641     CALCIUM             9.4 MG/DL 09/10/

2015 Unknown

 

          COMPREHENSIVE METABOLIC 03334     POTASSIUM           4.6 MMOL/L 09/10

/2015 Unknown

 

          COMPREHENSIVE METABOLIC 59256     PROT TOT            6.2 GM/DL 09/10/

2015 Unknown

 

          COMPREHENSIVE METABOLIC 20380     Glucose             90 MG/DL  09/10/

2015 Unknown

 

          COMPREHENSIVE METABOLIC 57699     BICARB              24 MMOL/L 09/10/

2015 Unknown

 

          COMPREHENSIVE METABOLIC 37289     ANION GAP           7 MEQ/L   09/10/

2015 Unknown

 

          THYROID STIMULATING HORMONE 65423     TSH                 2.427 uIU/ML

 2015 Unknown

 

          LIPID GROUP 02489     HDL TEST            47 MG/DL  2015 Unknown

 

          LIPID GROUP 64609     TRIG                145 MG/DL 2015 Unknown

 

          LIPID GROUP 05972     TEST LDL            73 MG/DL  2015 Unknown

 

          LIPID GROUP 73199     CHOL                149 MG/DL 2015 Unknown

 

          LIPID GROUP 80130     RCHOL/HDL           3.17 RATIO 2015 Unknow

n

 

          LIPID GROUP 21205     NON-HDL CH           102 MG/DL 2015 Unknow

n

 

          COMPREHENSIVE METABOLIC 86378     AST                 17 U/L    2015 Unknown

 

          COMPREHENSIVE METABOLIC 35255     ALT                 9 IU/L    2015 Unknown

 

          COMPREHENSIVE METABOLIC 41464     BUN                 19 MG/DL  2015 Unknown

 

          COMPREHENSIVE METABOLIC 08842     ALBUMIN             4.3 GM/DL 2015 Unknown

 

          COMPREHENSIVE METABOLIC 90757     CHLORIDE            108 MMOL/L  Unknown

 

          COMPREHENSIVE METABOLIC 42025     BILI TOT            0.5 MG/DL 2015 Unknown

 

          COMPREHENSIVE METABOLIC 67657     ALK PHOS            68 U/L    2015 Unknown

 

          COMPREHENSIVE METABOLIC 50684     SODIUM              140 MMOL/L  Unknown

 

          COMPREHENSIVE METABOLIC 04016     CREATININE           1.08 MG/DL 2015 Unknown

 

          COMPREHENSIVE METABOLIC 46384     CALCIUM             9.9 MG/DL 2015 Unknown

 

          COMPREHENSIVE METABOLIC 10559     POTASSIUM           4.3 MMOL/L  Unknown

 

          COMPREHENSIVE METABOLIC 31175     PROT TOT            7.2 GM/DL 2015 Unknown

 

          COMPREHENSIVE METABOLIC 84796     Glucose             94 MG/DL  2015 Unknown

 

          COMPREHENSIVE METABOLIC 04216     BICARB              26 MMOL/L 2015 Unknown

 

          COMPREHENSIVE METABOLIC 93640     ANION GAP           6 MEQ/L   2015 Unknown

 

          GFR CALC  8568995   GFR AA              60.0L ML/MIN 2015 Unknow

n

 

          GFR CALC  6626557   GFR NON-AA           49.0L ML/MIN 2015 Unkno

wn

 

          GLYCOSYLATED HEMOGLOBIN TEST 14616     A1C HPLC  25899-9   5.6 %     0

3/06/2015 Unknown

 

          COMPLETE BLOOD COUNT [id number]   WBC                 7.2 10e9/L 20

15 Unknown

 

          COMPLETE BLOOD COUNT [id number]   RBC                 4.28 10e12/L 2015 Unknown

 

          COMPLETE BLOOD COUNT 7096659   HGB                 12.8 g/dL 

5 Unknown

 

          COMPLETE BLOOD COUNT 3508382   HCT DET             39.3 %    

5 Unknown

 

          COMPLETE BLOOD COUNT 8858804   MCV                 91.8 fL   

5 Unknown

 

          COMPLETE BLOOD COUNT 1329402   MCH                 29.9 pg   

5 Unknown

 

          COMPLETE BLOOD COUNT [id number]   MCHC                32.6 g/dL 

5 Unknown

 

          COMPLETE BLOOD COUNT 3147376   PLT                 189 10e9/L 20

15 Unknown

 

          COMPLETE BLOOD COUNT 6964356   MPV                 11.2 fL   

5 Unknown

 

          COMPLETE BLOOD COUNT 1790591   ARIK %               38.0 %    

5 Unknown

 

          COMPLETE BLOOD COUNT 8882299   LY %                51.0 %    

5 Unknown

 

          COMPLETE BLOOD COUNT 6030143   MON %               7.7 %     

5 Unknown

 

          COMPLETE BLOOD COUNT 3873849   EOS  %              2.9 %     

5 Unknown

 

          COMPLETE BLOOD COUNT 1287397   BASO %              0.4 %     

5 Unknown

 

          COMPLETE BLOOD COUNT 5776311   RDW                 14.0 %    

5 Unknown

 

          COMPLETE BLOOD COUNT 7248090   ABS ARIK             2.74 10e9/L 

015 Unknown

 

          COMPLETE BLOOD COUNT [id number]   ABS LYMPH           3.67 10e9/L 

015 Unknown

 

          COMPLETE BLOOD COUNT [id number]   ABS MONO            0.55 10e9/L 

015 Unknown

 

          COMPLETE BLOOD COUNT [id number]   ABS EOS             0.21 10e9/L 

015 Unknown

 

          COMPLETE BLOOD COUNT [id number]   ABS BASO            0.03 10e9/L 

015 Unknown

 

          COMPLETE BLOOD COUNT [id number]   RDW-SD              46.1 fL   

5 Unknown

 

          FREE T4   48726     FREE T4             1.14 NG/DL 2015 Unknown

 

          GLYCOSYLATED HEMOGLOBIN TEST 78968     A1C HPLC  73103-5   5.2 %     0

2014 Unknown

 

          FREE T4   71833     FREE T4             1.40 NG/DL 2014 Unknown

 

          GFR CALC  1802948   GFR AA              >60 ML/MIN 2014 Unknown

 

          GFR CALC  5754415   GFR NON-AA           52.0L ML/MIN 2014 Unkno

wn

 

          COMPREHENSIVE METABOLIC 13926     AST                 15 U/L    2014 Unknown

 

          COMPREHENSIVE METABOLIC 10459     ALT                 9 IU/L    2014 Unknown

 

          COMPREHENSIVE METABOLIC 58665     BUN                 17 MG/DL  2014 Unknown

 

          COMPREHENSIVE METABOLIC 15483     ALBUMIN             4.0 GM/DL 2014 Unknown

 

          COMPREHENSIVE METABOLIC 85869     CHLORIDE            112 MMOL/L  Unknown

 

          COMPREHENSIVE METABOLIC 42999     BILI TOT            0.5 MG/DL 2014 Unknown

 

          COMPREHENSIVE METABOLIC 13172     ALK PHOS            66 U/L    2014 Unknown

 

          COMPREHENSIVE METABOLIC 34775     SODIUM              140 MMOL/L  Unknown

 

          COMPREHENSIVE METABOLIC 96725     CREATININE           1.03 MG/DL  Unknown

 

          COMPREHENSIVE METABOLIC 91260     CALCIUM             9.5 MG/DL 2014 Unknown

 

          COMPREHENSIVE METABOLIC 05875     POTASSIUM           4.1 MMOL/L  Unknown

 

          COMPREHENSIVE METABOLIC 97032     PROT TOT            6.2 GM/DL 2014 Unknown

 

          COMPREHENSIVE METABOLIC 09592     Glucose             102 MG/DL 2014 Unknown

 

          COMPREHENSIVE METABOLIC 00906     BICARB              23 MMOL/L 2014 Unknown

 

          COMPREHENSIVE METABOLIC 59973     ANION GAP           5 MEQ/L   2014 Unknown

 

          THYROID STIMULATING HORMONE 98177     TSH                 2.074 uIU/ML

 2014 Unknown

 

          VITAMIN B 12 FOLIC ACID [id number] VIT B 12            423 PG/ML  Unknown

 

          VITAMIN B 12 FOLIC ACID [id number] FOLIC ACID           19.7 NG/ML  Unknown

 

          LIPID GROUP 80001     HDL TEST            40 MG/DL  2014 Unknown

 

          LIPID GROUP 13008     TRIG                145 MG/DL 2014 Unknown

 

          LIPID GROUP 58969     TEST LDL            81 MG/DL  2014 Unknown

 

          LIPID GROUP 13024     CHOL                150 MG/DL 2014 Unknown

 

          LIPID GROUP [id number]     RCHOL/HDL           3.75 RATIO 2014 Unknow

n

 

          COMPLETE BLOOD COUNT [id number]   WBC                 6.0 10e9/L 20

14 Unknown

 

          COMPLETE BLOOD COUNT [id number]   RBC                 4.26 10e12/L 2014 Unknown

 

          COMPLETE BLOOD COUNT [id number]   HGB                 12.7 g/dL 

4 Unknown

 

          COMPLETE BLOOD COUNT [id number]   HCT DET             38.7 %    

4 Unknown

 

          COMPLETE BLOOD COUNT [id number]   MCV                 90.8 fL   

4 Unknown

 

          COMPLETE BLOOD COUNT [id number]   MCH                 29.8 pg   

4 Unknown

 

          COMPLETE BLOOD COUNT [id number]   MCHC                32.8 g/dL 

4 Unknown

 

          COMPLETE BLOOD COUNT [id number]   PLT                 178 10e9/L 20

14 Unknown

 

          COMPLETE BLOOD COUNT [id number]   MPV                 11.7 fL   

4 Unknown

 

          COMPLETE BLOOD COUNT [id number]   ARIK %               30.5 %    

4 Unknown

 

          COMPLETE BLOOD COUNT [id number]   LY %                55.4 %    

4 Unknown

 

          COMPLETE BLOOD COUNT [id number]   MON %               9.0 %     

4 Unknown

 

          COMPLETE BLOOD COUNT [id number]   EOS  %              4.4 %     

4 Unknown

 

          COMPLETE BLOOD COUNT [id number]   BASO %              0.7 %     

4 Unknown

 

          COMPLETE BLOOD COUNT [id number]   RDW                 13.3 %    

4 Unknown

 

          COMPLETE BLOOD COUNT [id number]   ABS ARIK             1.83 10e9/L 

014 Unknown

 

          COMPLETE BLOOD COUNT [id number]   ABS LYMPH           3.32 10e9/L 

014 Unknown

 

          COMPLETE BLOOD COUNT [id number]   ABS MONO            0.54 10e9/L 

014 Unknown

 

          COMPLETE BLOOD COUNT 9805928   ABS EOS             0.26 10e9/L 

014 Unknown

 

          COMPLETE BLOOD COUNT [id number]   ABS BASO            0.04 10e9/L 

014 Unknown

 

          COMPLETE BLOOD COUNT [id number]   RDW-SD              43.2 fL   

4 Unknown

 

          HEMOGLOBIN A1C (GLYCOSYLATED) [id number]   A1C HPLC  08425-2   5.5 %     

2012 Unknown

 

          COMPLETE BLOOD COUNT [id number]   WBC                 6.0 10e9/L 20

12 Unknown

 

          COMPLETE BLOOD COUNT 0770916   RBC                 4.22 10e12/L 2012 Unknown

 

          COMPLETE BLOOD COUNT 7641156   HGB                 12.4 g/dL 

2 Unknown

 

          COMPLETE BLOOD COUNT [id number]   HCT DET             38.2 %    

2 Unknown

 

          COMPLETE BLOOD COUNT 9226309   MCV                 90.5 fL   

2 Unknown

 

          COMPLETE BLOOD COUNT [id number]   MCH                 29.4 pg   

2 Unknown

 

          COMPLETE BLOOD COUNT 3114890   MCHC                32.5 g/dL 

2 Unknown

 

          COMPLETE BLOOD COUNT 9468047   PLT                 187 10e9/L 20

12 Unknown

 

          COMPLETE BLOOD COUNT [id number]   MPV                 11.5 fL   

2 Unknown

 

          COMPLETE BLOOD COUNT [id number]   ARIK %               36.4 %    

2 Unknown

 

          COMPLETE BLOOD COUNT 1436841   LY %                51.0 %    

2 Unknown

 

          COMPLETE BLOOD COUNT 7970074   MON %               8.7 %     

2 Unknown

 

          COMPLETE BLOOD COUNT [id number]   EOS  %              3.2 %     

2 Unknown

 

          COMPLETE BLOOD COUNT [id number]   BASO %              0.7 %     

2 Unknown

 

          COMPLETE BLOOD COUNT [id number]   RDW                 13.7 %    

2 Unknown

 

          COMPLETE BLOOD COUNT 6462018   ABS ARIK             2.18 10e9/L 

012 Unknown

 

          COMPLETE BLOOD COUNT 6031305   ABS LYMPH           3.06 10e9/L 

012 Unknown

 

          COMPLETE BLOOD COUNT [id number]   ABS MONO            0.52 10e9/L 

012 Unknown

 

          COMPLETE BLOOD COUNT [id number]   ABS EOS             0.19 10e9/L 

012 Unknown

 

          COMPLETE BLOOD COUNT 8133854   ABS BASO            0.04 10e9/L 

012 Unknown

 

          COMPLETE BLOOD COUNT 6904596   RDW-SD              44.3 fL   

2 Unknown

 

          LIPID GROUP [id number]     HDL TEST            42 MG/DL  2012 Unknown

 

          LIPID GROUP [id number]     TRIG                156 MG/DL 2012 Unknown

 

          LIPID GROUP [id number]     TEST LDL            80 MG/DL  2012 Unknown

 

          LIPID GROUP [id number]     CHOL                153 MG/DL 2012 Unknown

 

          LIPID GROUP [id number]     RCHOL/HDL           3.64 RATIO 2012 Unknow

n

 

          FREE T4   75431     FREE T4             1.22 NG/DL 2012 Unknown

 

          COMPREHENSIVE METABOLIC 09711     AST                 20 U/L    2012 Unknown

 

          COMPREHENSIVE METABOLIC 40276     ALT                 11 IU/L   2012 Unknown

 

          COMPREHENSIVE METABOLIC 18174     BUN                 19 MG/DL  2012 Unknown

 

          COMPREHENSIVE METABOLIC 56713     ALBUMIN             4.3 GM/DL 2012 Unknown

 

          COMPREHENSIVE METABOLIC 47460     CHLORIDE            109 MMOL/L  Unknown

 

          COMPREHENSIVE METABOLIC 24828     BILI TOT            0.6 MG/DL 2012 Unknown

 

          COMPREHENSIVE METABOLIC 45300     ALK PHOS            84 U/L    2012 Unknown

 

          COMPREHENSIVE METABOLIC 40293     SODIUM              142 MMOL/L  Unknown

 

          COMPREHENSIVE METABOLIC 33816     CREATININE           1.09 MG/DL  Unknown

 

          COMPREHENSIVE METABOLIC 29880     CALCIUM             9.8 MG/DL 2012 Unknown

 

          COMPREHENSIVE METABOLIC 68211     POTASSIUM           4.2 MMOL/L  Unknown

 

          COMPREHENSIVE METABOLIC 21236     PROT TOT            6.4 GM/DL 2012 Unknown

 

          COMPREHENSIVE METABOLIC 74519     Glucose             89 MG/DL  2012 Unknown

 

          COMPREHENSIVE METABOLIC 27804     BICARB              25 MMOL/L 2012 Unknown

 

          COMPREHENSIVE METABOLIC 13184     ANION GAP           8 MEQ/L   2012 Unknown

 

          GFR CALC  7266068   GFR AA              60.0L ML/MIN 2012 Unknow

n

 

          GFR CALC  3386001   GFR NON-AA           49.0L ML/MIN 2012 Unkno

wn

 

          THYROID STIMULATING HORMONE 64182     TSH                 2.450 uIU/ML

 2012 Unknown

 

          COMPREHENSIVE METABOLIC 07983     AST                 22 U/L    2012 Unknown

 

          COMPREHENSIVE METABOLIC 76768     ALT                 14 IU/L   2012 Unknown

 

          COMPREHENSIVE METABOLIC 04352     BUN                 21 MG/DL  2012 Unknown

 

          COMPREHENSIVE METABOLIC 02471     ALBUMIN             4.3 GM/DL 2012 Unknown

 

          COMPREHENSIVE METABOLIC 93841     CHLORIDE            106 MMOL/L  Unknown

 

          COMPREHENSIVE METABOLIC 07962     BILI TOT            0.4 MG/DL 2012 Unknown

 

          COMPREHENSIVE METABOLIC 60644     ALK PHOS            80 U/L    2012 Unknown

 

          COMPREHENSIVE METABOLIC 91512     SODIUM              141 MMOL/L  Unknown

 

          COMPREHENSIVE METABOLIC 63860     CREATININE           1.13 MG/DL  Unknown

 

          COMPREHENSIVE METABOLIC 30157     CALCIUM             9.4 MG/DL 2012 Unknown

 

          COMPREHENSIVE METABOLIC 45205     POTASSIUM           4.3 MMOL/L  Unknown

 

          COMPREHENSIVE METABOLIC 60894     PROT TOT            6.7 GM/DL 2012 Unknown

 

          COMPREHENSIVE METABOLIC 70012     Glucose             98 MG/DL  2012 Unknown

 

          COMPREHENSIVE METABOLIC 00786     BICARB              25 MMOL/L 2012 Unknown

 

          COMPREHENSIVE METABOLIC 05638     ANION GAP           10 MEQ/L  2012 Unknown

 

          LIPID GROUP [id number]     HDL TEST            44 MG/DL  2012 Unknown

 

          LIPID GROUP [id number]     TRIG                164 MG/DL 2012 Unknown

 

          LIPID GROUP [id number]     TEST LDL            98 MG/DL  2012 Unknown

 

          LIPID GROUP [id number]     CHOL                175 MG/DL 2012 Unknown

 

          LIPID GROUP [id number]     RCHOL/HDL           3.98 RATIO 2012 Unknow

n

 

          COMPLETE BLOOD COUNT 12954     WBC                 6.7 10e9/L 20

12 Unknown

 

          COMPLETE BLOOD COUNT 11454     RBC                 4.36 10e12/L 2012 Unknown

 

          COMPLETE BLOOD COUNT 30030     HGB                 12.9 g/dL 

2 Unknown

 

          COMPLETE BLOOD COUNT 83684     HCT DET             39.4 %    

2 Unknown

 

          COMPLETE BLOOD COUNT 46056     MCV                 90.4 fL   

2 Unknown

 

          COMPLETE BLOOD COUNT 29538     MCH                 29.6 pg   

2 Unknown

 

          COMPLETE BLOOD COUNT 76934     MCHC                32.7 g/dL 

2 Unknown

 

          COMPLETE BLOOD COUNT 18998     PLT                 184 10e9/L 20

12 Unknown

 

          COMPLETE BLOOD COUNT 53206     MPV                 10.9 fL   

2 Unknown

 

          COMPLETE BLOOD COUNT 62641     ARIK %               41.5 %    

2 Unknown

 

          COMPLETE BLOOD COUNT 65235     LY %                45.7 %    

2 Unknown

 

          COMPLETE BLOOD COUNT 31034     MON %               9.4 %     

2 Unknown

 

          COMPLETE BLOOD COUNT 32935     EOS  %              3.0 %     

2 Unknown

 

          COMPLETE BLOOD COUNT 08700     BASO %              0.4 %     

2 Unknown

 

          COMPLETE BLOOD COUNT 08944     RDW                 13.2 %    

2 Unknown

 

          COMPLETE BLOOD COUNT 22035     ABS ARIK             2.78 10e9/L 

012 Unknown

 

          COMPLETE BLOOD COUNT 35875     ABS LYMPH           3.06 10e9/L 

012 Unknown

 

          COMPLETE BLOOD COUNT 45479     ABS MONO            0.63 10e9/L 

012 Unknown

 

          COMPLETE BLOOD COUNT 31071     ABS EOS             0.20 10e9/L 

012 Unknown

 

          COMPLETE BLOOD COUNT 31448     ABS BASO            0.03 10e9/L 

012 Unknown

 

          COMPLETE BLOOD COUNT 75090     RDW-SD              42.3 fL   

2 Unknown

 

          GFR CALC  9022191   GFR AA              57.0L ML/MIN 2012 Unknow

n

 

          GFR CALC  0052568   GFR NON-AA           47.0L ML/MIN 2012 Unkno

wn

 

          THYROID STIMULATING HORMONE 98517     TSH                 2.663 uIU/ML

 2012 Unknown

 

          FREE T4   96231     FREE T4             1.15 NG/DL 2012 Unknown

 

          THYROID STIMULATING HORMONE 20843     TSH                 1.908 uIU/ML

 2011 Unknown

 

          COMPLETE BLOOD COUNT 17009     WBC                 6.4 10e9/L 20

11 Unknown

 

          COMPLETE BLOOD COUNT 09445     RBC                 3.92 10e12/L 2011 Unknown

 

          COMPLETE BLOOD COUNT 22188     HGB                 11.8 g/dL 

1 Unknown

 

          COMPLETE BLOOD COUNT 48283     HCT DET             36.0 %    

1 Unknown

 

          COMPLETE BLOOD COUNT 82899     MCV                 91.8 fL   

1 Unknown

 

          COMPLETE BLOOD COUNT 37462     MCH                 30.1 pg   

1 Unknown

 

          COMPLETE BLOOD COUNT 33105     MCHC                32.8 g/dL 

1 Unknown

 

          COMPLETE BLOOD COUNT 72392     PLT                 176 10e9/L 20

11 Unknown

 

          COMPLETE BLOOD COUNT 66661     MPV                 11.4 fL   

1 Unknown

 

          COMPLETE BLOOD COUNT 35232     ARIK %               50.4 %    

1 Unknown

 

          COMPLETE BLOOD COUNT 25141     LY %                35.5 %    

1 Unknown

 

          COMPLETE BLOOD COUNT 91099     MON %               10.2 %    

1 Unknown

 

          COMPLETE BLOOD COUNT 68344     EOS  %              3.3 %     

1 Unknown

 

          COMPLETE BLOOD COUNT 18332     BASO %              0.6 %     

1 Unknown

 

          COMPLETE BLOOD COUNT 74408     RDW                 13.7 %    

1 Unknown

 

          COMPLETE BLOOD COUNT 81195     ABS ARIK             3.23 10e9/L 

011 Unknown

 

          COMPLETE BLOOD COUNT 25670     ABS LYMPH           2.27 10e9/L 

011 Unknown

 

          COMPLETE BLOOD COUNT 95906     ABS MONO            0.65 10e9/L 

011 Unknown

 

          COMPLETE BLOOD COUNT 41731     ABS EOS             0.21 10e9/L 

011 Unknown

 

          COMPLETE BLOOD COUNT 58933     ABS BASO            0.04 10e9/L 

011 Unknown

 

          COMPLETE BLOOD COUNT 71688     RDW-SD              45.3 fL   

1 Unknown

 

          GFR CALC  3586036   GFR AA              >60 ML/MIN 2011 Unknown

 

          GFR CALC  9363742   GFR NON-AA           53.0L ML/MIN 2011 Unkno

wn

 

          FREE T4   87991     FREE T4             1.20 NG/DL 2011 Unknown

 

          COMPREHENSIVE METABOLIC 34327     AST                 17 U/L    2011 Unknown

 

          COMPREHENSIVE METABOLIC 87184     ALT                 9 IU/L    2011 Unknown

 

          COMPREHENSIVE METABOLIC 83248     BUN                 16 MG/DL  2011 Unknown

 

          COMPREHENSIVE METABOLIC 85647     ALBUMIN             4.0 GM/DL 2011 Unknown

 

          COMPREHENSIVE METABOLIC 76968     CHLORIDE            108 MMOL/L  Unknown

 

          COMPREHENSIVE METABOLIC 29855     BILI TOT            0.5 MG/DL 2011 Unknown

 

          COMPREHENSIVE METABOLIC 15925     ALK PHOS            76 U/L    2011 Unknown

 

          COMPREHENSIVE METABOLIC 09018     SODIUM              139 MMOL/L  Unknown

 

          COMPREHENSIVE METABOLIC 80411     CREATININE           1.02 MG/DL 2011 Unknown

 

          COMPREHENSIVE METABOLIC 56877     CALCIUM             9.2 MG/DL 2011 Unknown

 

          COMPREHENSIVE METABOLIC 50971     POTASSIUM           4.5 MMOL/L  Unknown

 

          COMPREHENSIVE METABOLIC 11501     PROT TOT            6.1 GM/DL 2011 Unknown

 

          COMPREHENSIVE METABOLIC 91541     Glucose             93 MG/DL  2011 Unknown

 

          COMPREHENSIVE METABOLIC 38233     BICARB              26 MMOL/L 2011 Unknown

 

          COMPREHENSIVE METABOLIC 06120     ANION GAP           5 MEQ/L   2011 Unknown

 

          LIPID GROUP 46194     HDL TEST            46 MG/DL  2011 Unknown

 

          LIPID GROUP 10236     TRIG                102 MG/DL 2011 Unknown

 

          LIPID GROUP 03410     TEST LDL            88 MG/DL  2011 Unknown

 

          LIPID GROUP 97814     CHOL                154 MG/DL 2011 Unknown

 

          LIPID GROUP 95615     RCHOL/HDL           3.35 RATIO 2011 Unknow

n







Procedures





                    Procedure           Codes               Date

 

                    ROUTINE VENIPUNCTURE CPT-4: 33306        2020

 

                    ASSAY THYROID STIM HORMONE CPT-4: 27205        2020

 

                    COMPLETE CBC W/AUTO DIFF WBC CPT-4: 36383        2020

 

                    COMPREHEN METABOLIC PANEL CPT-4: 19519        2020

 

                    ROUTINE VENIPUNCTURE CPT-4: 97548        2019

 

                    LIPID PANEL         CPT-4: 42415        2019

 

                    FLU VACC PRSV FREE INC ANTIG 65 AND OLDER CPT-4: 48159      

  10/22/2019

 

                    FLU VACC PRSV FREE INC ANTIG 65 AND OLDER CPT-4: 47107      

  10/22/2019

 

                    ADMIN INFLUENZA VIRUS VAC CPT-4:         10/22/2019

 

                    COMPREHEN METABOLIC PANEL CPT-4: 77368        10/11/2019

 

                    ROUTINE VENIPUNCTURE CPT-4: 60698        10/11/2019

 

                    ROUTINE VENIPUNCTURE CPT-4: 96978        06/10/2019

 

                    ASSAY THYROID STIM HORMONE CPT-4: 74493        06/10/2019

 

                    COMPREHEN METABOLIC PANEL CPT-4: 14982        06/10/2019

 

                    COMPLETE CBC W/AUTO DIFF WBC CPT-4: 04493        06/10/2019

 

                    URINALYSIS NONAUTO W/O SCOPE CPT-4: 51217        2019

 

                    URINE CULTURE/ COLONY COUNT CPT-4: 70401        2019

 

                    URINE CULTURE/ COLONY COUNT CPT-4: 23343        10/02/2018

 

                    ROUTINE VENIPUNCTURE CPT-4: 53964        2018

 

                    ASSAY OF FREE THYROXINE CPT-4: 46363        2018

 

                    ASSAY THYROID STIM HORMONE CPT-4: 96262        2018

 

                    COMPLETE CBC W/AUTO DIFF WBC CPT-4: 75963        2018

 

                    METABOLIC PANEL TOTAL CA CPT-4: 86383        2018

 

                    FLU VACC PRSV FREE INC ANTIG 65 AND OLDER CPT-4: 38950      

  2018

 

                    ASSAY, GLUCOSE, BLOOD QUANT CPT-4: 82394        2018

 

                    ADMIN INFLUENZA VIRUS VAC CPT-4:         2018

 

                    ROUTINE VENIPUNCTURE CPT-4: 37555        2018

 

                    COMPREHEN METABOLIC PANEL CPT-4: 03135        2018

 

                    COMPLETE CBC W/AUTO DIFF WBC CPT-4: 06081        2018

 

                    A1C HPLC            CPT-4: 62463        2018

 

                    ASSAY OF TROPONIN QUANT CPT-4: 41244        2018

 

                    LIPID PANEL         CPT-4: 32655        2018

 

                    THER/PROPH/DIAG INJ SC/IM CPT-4: 01911        2018

 

                    TRIAMCINOLONE ACET INJ NOS CPT-4:         2018

 

                    URINALYSIS NONAUTO W/O SCOPE CPT-4: 21268        2018

 

                    URINE CULTURE/ COLONY COUNT CPT-4: 70918        2018

 

                    FLU VACC PRSV FREE INC ANTIG 65 AND OLDER CPT-4: 28912      

  10/06/2017

 

                    ADMIN INFLUENZA VIRUS VAC CPT-4:         10/06/2017

 

                    ROUTINE VENIPUNCTURE CPT-4: 66983        2017

 

                    COMPREHEN METABOLIC PANEL CPT-4: 43486        2017

 

                    COMPLETE CBC W/AUTO DIFF WBC CPT-4: 64670        2017

 

                    LIPID PANEL         CPT-4: 08346        2017

 

                    A1C HPLC            CPT-4: 50051        2017

 

                    ASSAY THYROID STIM HORMONE CPT-4: 17210        2017

 

                    ROUTINE VENIPUNCTURE CPT-4: 85753        2017

 

                    ASSAY THYROID STIM HORMONE CPT-4: 38835        2017

 

                    COMPREHEN METABOLIC PANEL CPT-4: 57603        2017

 

                    COMPLETE CBC W/AUTO DIFF WBC CPT-4: 80523        2017

 

                    A1C HPLC            CPT-4: 44219        2017

 

                    FLU VACC PRSV FREE INC ANTIG 65 AND OLDER CPT-4: 09837      

  10/07/2016

 

                    ADMIN INFLUENZA VIRUS VAC CPT-4:         10/07/2016

 

                    ROUTINE VENIPUNCTURE CPT-4: 61547        2016

 

                    ASSAY OF FREE THYROXINE CPT-4: 35921        2016

 

                    ASSAY THYROID STIM HORMONE CPT-4: 55671        2016

 

                    COMPREHEN METABOLIC PANEL CPT-4: 50016        2016

 

                    COMPLETE CBC W/AUTO DIFF WBC CPT-4: 27816        2016

 

                    LIPID PANEL         CPT-4: 78801        2016

 

                    A1C HPLC            CPT-4: 31438        2016

 

                    URINALYSIS NONAUTO W/O SCOPE CPT-4: 03976        2016

 

                    ROUTINE VENIPUNCTURE CPT-4: 42753        2015

 

                    METABOLIC PANEL TOTAL CA CPT-4: 12524        2015

 

                    PRESCRIP TRANSMIT VIA ERX SY CPT-4:         2015

 

                    FLU VACC PRSV FREE INC ANTIG 65 AND OLDER CPT-4: 28173      

  10/16/2015

 

                    ADMIN INFLUENZA VIRUS VAC CPT-4:         10/16/2015

 

                    URINALYSIS NONAUTO W/O SCOPE CPT-4: 58293        09/15/2015

 

                    URINE CULTURE/ COLONY COUNT CPT-4: 01189        09/15/2015

 

                    ROUTINE VENIPUNCTURE CPT-4: 94251        09/10/2015

 

                    ASSAY OF FREE THYROXINE CPT-4: 23967        09/10/2015

 

                    ASSAY THYROID STIM HORMONE CPT-4: 04431        09/10/2015

 

                    COMPREHEN METABOLIC PANEL CPT-4: 09000        09/10/2015

 

                    COMPLETE CBC W/AUTO DIFF WBC CPT-4: 24157        09/10/2015

 

                    LIPID PANEL         CPT-4: 97072        09/10/2015

 

                    A1C HPLC            CPT-4: 56544        09/10/2015

 

                    CERUM REMOVAL       CPT-4: 25676        2015

 

                    PRESCRIP TRANSMIT VIA ERX SY CPT-4:         2015

 

                    FLUZONE, 5ML (Medicare) CPT-4:         10/17/2014

 

                    ADMIN INFLUENZA VIRUS VAC CPT-4:         10/17/2014

 

                    PRESCRIP TRANSMIT VIA ERX SY CPT-4:         2014

 

                    PRESCRIP TRANSMIT VIA ERX SY CPT-4:         2014

 

                    PRESCRIP TRANSMIT VIA ERX SY CPT-4:         2014

 

                    ROUTINE VENIPUNCTURE CPT-4: 03657        2014

 

                    ASSAY OF FREE THYROXINE CPT-4: 51143        2014

 

                    ASSAY THYROID STIM HORMONE CPT-4: 00820        2014

 

                    COMPREHEN METABOLIC PANEL CPT-4: 30377        2014

 

                    COMPLETE CBC W/AUTO DIFF WBC CPT-4: 19668        2014

 

                    LIPID PANEL         CPT-4: 42916        2014

 

                    A1C HPLC            CPT-4: 22756        2014

 

                    VITAMIN B 12 FOLIC ACID CPT-4: 49322|41567  2014

 

                    PRESCRIP TRANSMIT VIA ERX SY CPT-4:         2014

 

                    PRESCRIP TRANSMIT VIA ERX SY CPT-4:         10/15/2013

 

                    FLUZONE, 5ML (Medicare) CPT-4:         2013

 

                    ADMIN INFLUENZA VIRUS VAC CPT-4:         2013

 

                    PRESCRIP TRANSMIT VIA ERX SY CPT-4:         2013

 

                    PRESCRIP TRANSMIT VIA ERX SY CPT-4:         05/10/2013

 

                    ROUTINE VENIPUNCTURE CPT-4: 33758        2012

 

                    ASSAY OF FREE THYROXINE CPT-4: 88078        2012

 

                    ASSAY THYROID STIM HORMONE CPT-4: 23615        2012

 

                    COMPREHEN METABOLIC PANEL CPT-4: 81416        2012

 

                    COMPLETE CBC W/AUTO DIFF WBC CPT-4: 78688        2012

 

                    LIPID PANEL         CPT-4: 02724        2012

 

                    A1C GLYCOSYLATED HEMOGLOBIN TEST CPT-4: 24063        

012

 

                    CERUM REMOVAL       CPT-4: 96710        2012

 

                    PRESCRIP TRANSMIT VIA ERX SY CPT-4:         2012

 

                    PRESCRIP TRANSMIT VIA ERX SY CPT-4:         10/10/2012

 

                    FLUZONE, 5ML (Medicare) CPT-4:         2012

 

                    ADMIN INFLUENZA VIRUS VAC CPT-4:         2012

 

                    ASSAY, GLUCOSE, BLOOD QUANT CPT-4: 32425        2012

 

                    URINALYSIS NONAUTO W/O SCOPE CPT-4: 80394        2012

 

                    URINE CULTURE/ COLONY COUNT CPT-4: 22877        2012

 

                    ROUTINE VENIPUNCTURE CPT-4: 25561        2012

 

                    ASSAY OF FREE THYROXINE CPT-4: 23343        2012

 

                    ASSAY THYROID STIM HORMONE CPT-4: 08867        2012

 

                    COMPREHEN METABOLIC PANEL CPT-4: 94037        2012

 

                    COMPLETE CBC W/AUTO DIFF WBC CPT-4: 25491        2012

 

                    LIPID PANEL         CPT-4: 75701        2012

 

                    ASSAY OF INSULIN    CPT-4: 80319        2012

 

                    A1C GLYCOSYLATED HEMOGLOBIN TEST CPT-4: 10799        07/11/2

012

 

                    DRAIN/INJECT JOINT/BURSA CPT-4: 15038        2012

 

                    METHYLPREDNISOLONE 40 MG INJ CPT-4:         2012

 

                    TRIAMCINOLONE ACET INJ NOS CPT-4:         2012

 

                    PRESCRIP TRANSMIT VIA ERX SY CPT-4:         2012

 

                    PRESCRIP TRANSMIT VIA ERX SY CPT-4:         2012

 

                    METHYLPREDNISOLONE 40 MG INJ CPT-4:         2012

 

                    DRAIN/INJECT JOINT/BURSA CPT-4: 59002        2012

 

                    TRIAMCINOLONE ACET INJ NOS CPT-4:         2012

 

                    PRESCRIP TRANSMIT VIA ERX SY CPT-4:         2012

 

                    ROUTINE VENIPUNCTURE CPT-4: 88264        2012

 

                    ASSAY OF FREE THYROXINE CPT-4: 56710        2012

 

                    ASSAY THYROID STIM HORMONE CPT-4: 42680        2012

 

                    COMPREHEN METABOLIC PANEL CPT-4: 39508        2012

 

                    COMPLETE CBC W/AUTO DIFF WBC CPT-4: 72949        2012

 

                    LIPID PANEL         CPT-4: 24697        2012

 

                    PRESCRIP TRANSMIT VIA ERX SY CPT-4:         2012

 

                    CERUM REMOVAL       CPT-4: 48858        2011

 

                    PRESCRIP TRANSMIT VIA ERX SY CPT-4:         2011

 

                    FLUZONE, 5ML (Medicare) CPT-4:         10/05/2011

 

                    ADMIN INFLUENZA VIRUS VAC CPT-4:         10/05/2011

 

                    PRESCRIP TRANSMIT VIA ERX SY CPT-4:         2011

 

                    URINALYSIS NONAUTO W/O SCOPE CPT-4: 61986        2011

 

                    URINE CULTURE/ COLONY COUNT CPT-4: 17317        2011

 

                    CUR TOBACCO NON-USER CPT-4:         2011

 

                    ROUTINE VENIPUNCTURE CPT-4: 62317        2011

 

                    COMPLETE CBC W/AUTO DIFF WBC CPT-4: 24459        2011

 

                    COMPREHEN METABOLIC PANEL CPT-4: 83795        2011

 

                    LIPID PANEL         CPT-4: 90582        2011

 

                    ASSAY THYROID STIM HORMONE CPT-4: 10221        2011

 

                    ASSAY OF FREE THYROXINE CPT-4: 88432        2011

 

                    PRESCRIP TRANSMIT VIA ERX SY CPT-4:         2011

 

                    INJ TRIGGER POINT 1/2 MUSCL CPT-4: 52985        2011

 

                    TRIAMCINOLONE ACET INJ NOS CPT-4:         2011

 

                    METHYLPREDNISOLONE 40 MG INJ CPT-4:         2011

 

                    THER/PROPH/DIAG INJ SC/IM CPT-4: 18055        2011

 

                    KETOROLAC TROMETHAMINE INJ CPT-4:         2011

 

                    PRESCRIP TRANSMIT VIA ERX SY CPT-4:         2010

 

                    FLU VACCINE 3 YRS & > IM UP 64 CPT-4: 11336        10/06/201

0

 

                    ADMIN INFLUENZA VIRUS VAC CPT-4:         10/06/2010

 

                    URINALYSIS NONAUTO W/O SCOPE CPT-4: 26972        2010

 

                    URINE CULTURE/ COLONY COUNT CPT-4: 58396        2010

 

                    PRESCRIP TRANSMIT VIA ERX SY CPT-4:         2010

 

                    THER/PROPH/DIAG INJ SC/IM CPT-4: 73584        2010

 

                    VITAMIN B12 INJECTION CPT-4:         2010

 

                    THER/PROPH/DIAG INJ SC/IM CPT-4: 40210        2010

 

                    VITAMIN B12 INJECTION CPT-4:         2010

 

                    ROUTINE VENIPUNCTURE CPT-4: 74894        2010







Vital Signs





                          Date                      Vital

 

                2020      Blood Pressure 1: 138/64 Code: 8480-6 Heart Rate

 1: 52 bpm 

Respiratory Rate: 18 bpm  SpO2: 98%                 Temperature: 36.3 (C) / 97.4

 (F)

 

                    2020          Blood Pressure 1: 144/82 Code: 8480-6 He

art Rate 1: 56 bpm

 

                2020      Blood Pressure 1: 154/86 Code: 8480-6 Heart Rate

 1: 64 bpm 

Respiratory Rate: 20 bpm SpO2: 99%           Temperature: 37.1 (C) / 98.7 (F) We

ight: 215 

lbs 

 

             2019   Blood Pressure 1: 140/70 Code: 8480-6 Heart Rate 1: 57

 bpm SpO2: 99%    

Temperature: 35.9 (C) / 96.6 (F)        Weight: 215 lbs 

 

                06/10/2019      Blood Pressure 1: 144/70 Code: 8480-6 Heart Rate

 1: 60 bpm 

Respiratory Rate: 20 bpm SpO2: 95%           Temperature: 36.4 (C) / 97.6 (F) We

ight: 214 

lbs 

 

                2019      Blood Pressure 1: 128/78 Code: 8480-6 Heart Rate

 1: 56 bpm 

Respiratory Rate: 20 bpm SpO2: 98%           Temperature: 36.3 (C) / 97.4 (F) We

ight: 213 

lbs 

 

                2018      Blood Pressure 1: 142/60 Code: 8480-6 BMI: 38.2 

Code: 36690-0 Heart 

Rate 1: 48 bpm  Height: 5'2"    Respiratory Rate: 20 bpm SpO2: 98%       Tempera

ture: 36.7

(C) / 98.1 (F)                          Weight: 212 lbs 

 

                2018      Blood Pressure 1: 142/64 Code: 8480-6 BMI: 38.5 

Code: 97198-7 Heart 

Rate 1: 52 bpm  Height: 5'2"    Respiratory Rate: 22 bpm SpO2: 96%       Tempera

ture: 36.1

(C) / 96.9 (F)                          Weight: 214 lbs 

 

                10/02/2018      Blood Pressure 1: 124/80 Code: 8480-6 BMI: 38.3 

Code: 46144-6 Heart 

Rate 1: 68 bpm      Height: 5'2"        Respiratory Rate: 20 bpm Temperature: 36

.3 (C) / 

97.4 (F)                                Weight: 213 lbs 

 

                2018      Blood Pressure 1: 132/78 Code: 8480-6 BMI: 37.6 

Code: 39643-7 Heart 

Rate 1: 68 bpm  Height: 5'2"    Respiratory Rate: 20 bpm SpO2: 97%       Tempera

ture: 36.8

(C) / 98.2 (F)                          Weight: 209 lbs 

 

                2018      Blood Pressure 1: 150/76 Code: 8480-6 BMI: 38.5 

Code: 70628-5 Heart 

Rate 1: 64 bpm  Height: 5'2"    Respiratory Rate: 20 bpm SpO2: 97%       Tempera

ture: 36.2

(C) / 97.2 (F)                          Weight: 214 lbs 

 

                2018      Blood Pressure 1: 122/74 Code: 8480-6 BMI: 38.2 

Code: 03070-4 Heart 

Rate 1: 64 bpm  Height: 5'2"    Respiratory Rate: 18 bpm SpO2: 96%       Tempera

ture: 35.8

(C) / 96.4 (F)                          Weight: 212 lbs 

 

                2018      Blood Pressure 1: 124/78 Code: 8480-6 BMI: 37.8 

Code: 46258-0 Heart 

Rate 1: 76 bpm      Height: 5'2"        Respiratory Rate: 20 bpm Temperature: 36

.8 (C) / 

98.3 (F)                                Weight: 210 lbs 

 

                2018      Blood Pressure 1: 136/70 Code: 8480-6 BMI: 38.0 

Code: 05587-7 Heart 

Rate 1: 68 bpm  Height: 5'2"    Respiratory Rate: 20 bpm SpO2: 97%       Tempera

ture: 36.8

(C) / 98.2 (F)                          Weight: 211 lbs 

 

                2018      Blood Pressure 1: 140/65 Code: 8480-6 Heart Rate

 1: 75 bpm 

Respiratory Rate: 24 bpm SpO2: 95%           Temperature: 37.0 (C) / 98.6 (F) We

ight: 211 

lbs 

 

                2018      Blood Pressure 1: 154/70 Code: 8480-6 BMI: 37.6 

Code: 17865-5 Heart 

Rate 1: 76 bpm  Height: 5'2"    Respiratory Rate: 20 bpm SpO2: 98%       Tempera

ture: 36.9

(C) / 98.5 (F)                          Weight: 209 lbs 

 

                2017      Blood Pressure 1: 156/70 Code: 8480-6 BMI: 37.1 

Code: 27133-4 Heart 

Rate 1: 72 bpm  Height: 5'2"    Respiratory Rate: 20 bpm SpO2: 97%       Tempera

ture: 37.0

(C) / 98.6 (F)                          Weight: 206 lbs 

 

                2017      Blood Pressure 1: 152/78 Code: 8480-6 BMI: 36.8 

Code: 12737-7 Heart 

Rate 1: 78 bpm  Height: 5'2"    Respiratory Rate: 20 bpm SpO2: 98%       Tempera

ture: 36.1

(C) / 97.0 (F)                          Weight: 204 lbs 

 

                2017      Blood Pressure 1: 142/70 Code: 8480-6 BMI: 36.9 

Code: 82709-6 Heart 

Rate 1: 64 bpm  Height: 5'2"    Respiratory Rate: 20 bpm SpO2: 96%       Tempera

ture: 36.5

(C) / 97.7 (F)                          Weight: 205 lbs 

 

                2017      Blood Pressure 1: 142/68 Code: 8480-6 Heart Rate

 1: 88 bpm 

Respiratory Rate: 18 bpm SpO2: 98%           Temperature: 36.3 (C) / 97.3 (F) We

ight: 209 

lbs 

 

                2016      Blood Pressure 1: 130/78 Code: 8480-6 Heart Rate

 1: 68 bpm 

Respiratory Rate: 20 bpm SpO2: 95%           Temperature: 36.4 (C) / 97.6 (F) We

ight: 212 

lbs 

 

                2016      Blood Pressure 1: 122/64 Code: 8480-6 BMI: 39.1 

Code: 99373-9 Heart 

Rate 1: 76 bpm      Height: 5'2"        Respiratory Rate: 20 bpm Temperature: 36

.8 (C) / 

98.2 (F)                                Weight: 217 lbs 

 

                2016      Blood Pressure 1: 144/70 Code: 8480-6 BMI: 39.4 

Code: 48317-9 Heart 

Rate 1: 76 bpm      Height: 5'2"        Respiratory Rate: 20 bpm Temperature: 36

.6 (C) / 

97.9 (F)                                Weight: 219 lbs 

 

                2015      Blood Pressure 1: 152/60 Code: 8480-6 BMI: 39.6 

Code: 65687-0 Heart 

Rate 1: 84 bpm      Height: 5'2"        Respiratory Rate: 20 bpm Temperature: 37

.0 (C) / 

98.6 (F)                                Weight: 220 lbs 

 

                2015      Blood Pressure 1: 146/76 Code: 8480-6 BMI: 39.8 

Code: 02878-4 Heart 

Rate 1: 88 bpm      Height: 5'2"        Respiratory Rate: 20 bpm Temperature: 37

.0 (C) / 

98.6 (F)                                Weight: 221 lbs 

 

                2015      Blood Pressure 1: 132/70 Code: 8480-6 BMI: 39.1 

Code: 33122-5 Heart 

Rate 1: 88 bpm      Height: 5'2"        Respiratory Rate: 20 bpm Temperature: 36

.4 (C) / 

97.6 (F)                                Weight: 217 lbs 

 

                2015      Blood Pressure 1: 132/66 Code: 8480-6 BMI: 39.9 

Code: 91323-2 Heart 

Rate 1: 72 bpm      Height: 5'2"        Respiratory Rate: 20 bpm Temperature: 36

.9 (C) / 

98.4 (F)                                Weight: 218 lbs 

 

                2015      Blood Pressure 1: 136/80 Code: 8480-6 Heart Rate

 1: 76 bpm 

Respiratory Rate: 20 bpm  Temperature: 36.7 (C) / 98.0 (F) Weight: 224 lbs 

 

                2015      Blood Pressure 1: 134/78 Code: 8480-6 BMI: 39.7 

Code: 63970-0 Heart 

Rate 1: 84 bpm      Height: 5'2"        Respiratory Rate: 20 bpm Temperature: 36

.7 (C) / 

98.0 (F)                                Weight: 217 lbs 

 

                2014      Blood Pressure 1: 146/78 Code: 8480-6 BMI: 39.5 

Code: 00399-6 Heart 

Rate 1: 82 bpm      Height: 5'2"        Respiratory Rate: 18 bpm Temperature: 35

.6 (C) / 

96.1 (F)                                Weight: 216 lbs 

 

                2014      Blood Pressure 1: 134/70 Code: 8480-6 BMI: 37.9 

Code: 14093-9 Heart 

Rate 1: 80 bpm      Height: 5'3"        Respiratory Rate: 20 bpm Temperature: 36

.8 (C) / 

98.2 (F)                                Weight: 214 lbs 

 

                2014      Blood Pressure 1: 132/70 Code: 8480-6 BMI: 37.6 

Code: 69573-9 Heart 

Rate 1: 80 bpm      Height: 5'3"        Respiratory Rate: 20 bpm Temperature: 36

.8 (C) / 

98.3 (F)                                Weight: 212 lbs 

 

                2014      Blood Pressure 1: 116/74 Code: 8480-6 Heart Rate

 1: 68 bpm 

Respiratory Rate: 20 bpm  Temperature: 36.2 (C) / 97.1 (F) Weight: 212 lbs 

 

                10/15/2013      Blood Pressure 1: 132/82 Code: 8480-6 BMI: 37.4 

Code: 52052-3 Heart 

Rate 1: 76 bpm      Height: 5'3"        Respiratory Rate: 20 bpm Temperature: 36

.7 (C) / 

98.0 (F)                                Weight: 211 lbs 

 

                    2013          Blood Pressure 1: 130/76 Code: 8480-6 He

art Rate 1: 78 bpm

 

                2013      Blood Pressure 1: 140/82 Code: 8480-6 BMI: 36.8 

Code: 58324-5 Heart 

Rate 1: 66 bpm      Height: 5'3"        Respiratory Rate: 20 bpm Temperature: 36

.1 (C) / 

96.9 (F)                                Weight: 208 lbs 

 

                2013      Blood Pressure 1: 138/80 Code: 8480-6 BMI: 36.4 

Code: 42505-2 Heart 

Rate 1: 72 bpm      Height: 5'4"        Respiratory Rate: 20 bpm Temperature: 36

.7 (C) / 

98.0 (F)                                Weight: 212 lbs 

 

                2013      Blood Pressure 1: 124/70 Code: 8480-6 BMI: 36.9 

Code: 61232-1 Heart 

Rate 1: 60 bpm      Height: 5'4"        Temperature: 36.1 (C) / 97.0 (F) Weight:

 215 lbs 

 

                05/10/2013      Blood Pressure 1: 132/86 Code: 8480-6 BMI: 36.9 

Code: 70203-4 Heart 

Rate 1: 76 bpm      Height: 5'4"        Respiratory Rate: 20 bpm Temperature: 36

.8 (C) / 

98.2 (F)                                Weight: 215 lbs 

 

                2013      Blood Pressure 1: 134/82 Code: 8480-6 BMI: 36.6 

Code: 55368-4 Heart 

Rate 1: 72 bpm      Height: 5'4"        Respiratory Rate: 20 bpm Temperature: 36

.3 (C) / 

97.4 (F)                                Weight: 213 lbs 

 

                2012      Blood Pressure 1: 142/80 Code: 8480-6 BMI: 37.1 

Code: 54275-9 Heart 

Rate 1: 76 bpm      Height: 5'4"        Respiratory Rate: 20 bpm Temperature: 36

.8 (C) / 

98.3 (F)                                Weight: 216 lbs 

 

                10/23/2012      Blood Pressure 1: 128/68 Code: 8480-6 BMI: 37.6 

Code: 16394-0 Heart 

Rate 1: 72 bpm      Height: 5'4"        Respiratory Rate: 20 bpm Temperature: 36

.6 (C) / 

97.9 (F)                                Weight: 219 lbs 

 

                10/10/2012      Blood Pressure 1: 122/70 Code: 8480-6 Heart Rate

 1: 76 bpm 

Respiratory Rate: 20 bpm  Temperature: 36.7 (C) / 98.1 (F) Weight: 218 lbs 

 

                    2012          Blood Pressure 1: 132/80 Code: 8480-6 He

art Rate 1: 84 bpm

 

                2012      Blood Pressure 1: 128/78 Code: 8480-6 BMI: 38.1 

Code: 35506-7 Heart 

Rate 1: 84 bpm      Height: 5'4"        Respiratory Rate: 20 bpm Temperature: 36

.9 (C) / 

98.4 (F)                                Weight: 222 lbs 

 

                2012      Blood Pressure 1: 138/80 Code: 8480-6 BMI: 37.9 

Code: 74784-8 Heart 

Rate 1: 74 bpm      Height: 5'4"        Temperature: 36.1 (C) / 97.0 (F) Weight:

 221 lbs 

 

                2012      Blood Pressure 1: 126/78 Code: 8480-6 BMI: 38.4 

Code: 28006-9 Heart 

Rate 1: 72 bpm      Height: 5'4"        Respiratory Rate: 20 bpm Temperature: 36

.7 (C) / 

98.0 (F)                                Weight: 224 lbs 

 

                2012      Blood Pressure 1: 138/72 Code: 8480-6 BMI: 38.4 

Code: 85022-3 Heart 

Rate 1: 72 bpm      Height: 5'4"        Respiratory Rate: 20 bpm Temperature: 36

.6 (C) / 

97.9 (F)                                Weight: 224 lbs 

 

                2012      Blood Pressure 1: 122/78 Code: 8480-6 BMI: 38.8 

Code: 67728-9 Heart 

Rate 1: 88 bpm      Height: 5'4"        Respiratory Rate: 20 bpm Temperature: 36

.6 (C) / 

97.8 (F)                                Weight: 226 lbs 

 

                2012      Blood Pressure 1: 116/60 Code: 8480-6 BMI: 38.1 

Code: 74935-5 Heart 

Rate 1: 92 bpm      Height: 5'4"        Respiratory Rate: 20 bpm Temperature: 36

.8 (C) / 

98.2 (F)                                Weight: 222 lbs 

 

                2011      Blood Pressure 1: 118/62 Code: 8480-6 BMI: 37.9 

Code: 70705-8 Heart 

Rate 1: 80 bpm      Height: 5'4"        Temperature: 36.5 (C) / 97.7 (F) Weight:

 221 lbs 

 

                2011      Blood Pressure 1: 132/70 Code: 8480-6 Heart Rate

 1: 84 bpm 

Respiratory Rate: 20 bpm  Temperature: 36.7 (C) / 98.0 (F) Weight: 221 lbs 

 

                2011      Blood Pressure 1: 114/72 Code: 8480-6 BMI: 37.6 

Code: 80117-7 Heart 

Rate 1: 76 bpm      Height: 5'4"        Respiratory Rate: 20 bpm Temperature: 36

.8 (C) / 

98.3 (F)                                Weight: 219 lbs 

 

                    2011          Blood Pressure 1: 136/76 Code: 8480-6 Te

mperature: 36.0 (C) / 96.8 

(F)                                     Weight: 219 lbs 8 oz

 

                2011      Blood Pressure 1: 128/80 Code: 8480-6 Heart Rate

 1: 72 bpm 

Temperature: 36.3 (C) / 97.4 (F)        Weight: 218 lbs 

 

                2011      Blood Pressure 1: 126/70 Code: 8480-6 Heart Rate

 1: 72 bpm 

Temperature: 36.7 (C) / 98.0 (F)

 

                    2010          Blood Pressure 1: 126/78 Code: 8480-6 Te

mperature: 36.2 (C) / 97.1 

(F)                                     Weight: 217 lbs 8 oz

 

                2010      Blood Pressure 1: 116/70 Code: 8480-6 Heart Rate

 1: 72 bpm 

Temperature: 36.4 (C) / 97.6 (F)        Weight: 222 lbs 

 

                2010      Blood Pressure 1: 114/78 Code: 8480-6 Heart Rate

 1: 72 bpm 

Temperature: 37.1 (C) / 98.8 (F)        Weight: 225 lbs 

 

                2010      Blood Pressure 1: 128/78 Code: 8480-6 Heart Rate

 1: 76 bpm 

Temperature: 36.6 (C) / 97.8 (F)        Weight: 224 lbs 

 

                2010      Blood Pressure 1: 116/78 Code: 8480-6 Heart Rate

 1: 80 bpm 

Temperature: 36.4 (C) / 97.6 (F)        Weight: 226 lbs 

 

                2010      Blood Pressure 1: 120/70 Code: 8480-6 BMI: 38.3 

Code: 19973-4 Heart 

Rate 1: 88 bpm      Height: 5'5"        Temperature: 36.4 (C) / 97.6 (F) Weight:

 230 lbs 







Functional Status

No Functional Status data



Reason For Visit





                    Reason For Visit    Effective Dates     Notes

 

                    follow up           2020           

 

                    blood pressure check 2020           

 

                    high blood pressure 2020           

 

                    lab draw            2019           

 

                    lab draw            10/11/2019           

 

                    hearing loss        2019          left ear

 

                    follow up           06/10/2019           

 

                    follow up           2019          Patient has upcoming

 appointment with Dr Claire and carotid 

dopplers tomorrow

 

                    follow up           2018          Patient had heart ca

th on 10-30-18 and one of the bypass 

veins in spasming

 

                    follow up           2018          4 Week

 

                    follow up           10/02/2018           

 

                    follow up           2018           

 

                    high blood pressure 2018          Dr Claire has ordered

 holter monitor and 

hydralazine 50mg PRN

 

                    dizziness           2018          On  morning darren delvalle woke up and went to the bathroom 

and after lying down became very dizzy. Patient has hx of vertigo so didn't 
think anything of it. She usually just has them intermittently, but had more 
that day. While at [employer] began to feel very ill and became very shaky. She got 
home and sat in the recliner and had the jittery/nervous feeling. Later that 
night it finally resolved. Patient feels like she had a spell like this around 
the  and thought it was due to extreme heat exhaustion.

 

                    follow up           2018           

 

                    back pain           2018           

 

                    otalgia             2018           

 

                    back pain           2018           

 

                    injection(s)        10/06/2017          flu shot

 

                    lab draw            2017           

 

                    follow up           2017           

 

                    pelvic pain         2017          Patient states pain 

started in May with a "Constant Ache"

to left inguinal area. Patient states at that time pain was intermittent. Then, 
approximately 2nd week  of  pain worsened and radiates to left low back 
area.

 

                    lab draw            2017           

 

                    hyperglycemia       2017           

 

                    cough               2017           

 

                    otalgia             2016           

 

                    injection(s)        10/07/2016          Influenza

 

                    lab draw            2016           

 

                    constipation        2016           

 

                    flank pain          2016           

 

                    follow up           2015          3mo fwup

 

                    injection(s)        10/16/2015          Influenza

 

                    acute renal failure 09/15/2015           

 

                    lab draw            09/10/2015           

 

                    fatigue             2015           

 

                    otalgia             2015           

 

                    pain, limb          2015           

 

                    follow up           2015          [facility] fwup

 

                    high blood pressure 2015           

 

                    injection(s)        10/17/2014          flu shot

 

                    sinusitis           2014           

 

                    high blood pressure 2014           

 

                    cough               2014          Was panfilo at Dr Tyree teixeira's office so he started he on amlodipine 

10mg but seems to be dragging her down. Patient decreased to 1/2 tablet this 
last week

 

                    lab draw            2014           

 

                    cough               2014           

 

                    cough               10/15/2013           

 

                    high blood pressure 2013           

 

                    follow up           2013          ER

 

                    dizziness           2013           

 

                    follow up           2013           

 

                    otalgia             05/10/2013           

 

                    breast complaint    2013           

 

                    lab draw            2012           

 

                    follow up           2012          2mo fwup

 

                    follow up           10/23/2012          2wk fwup

 

                    gas and bloating    10/10/2012          Dr Claire has her mon

itoring because was high in his 

office at last visit

 

                    injection(s)        2012           

 

                    dizziness           2012           

 

                    dizziness           2012           

 

                    lab draw            2012           

 

                    muscle weakness     2012          concerns of simvasta

tin with fatigue and muscle 

weakness

 

                    leg pain/sciatica   2012           

 

                    hip pain            2012           

 

                    back pain           2012          has tried ice pack, 

muscle relaxers, and moist heat

 

                    back pain           2012           

 

                    fatigue             2012          in hands/feet

 

                    otalgia             2011           

 

                    follow up           2011          2wk fwup

 

                    back pain           2011          thinks ulcer may hav

e returned

 

                    lab draw            2011           

 

                    dizziness           2011          Left ear and facial 

pain, right ear pain 

 

                    follow up           2011          1wk fwup

 

                    hip pain            2011          feels very draggy, f

atigue, BP 80/63, normally running 

100's/60's

 

                    chills              2010           

 

                    injection(s)        10/06/2010          flu shot

 

                    follow up           2010          6wk fwup, had HH rep

aired and is starting to feel better, 

started on reglan 10mg tid

 

                    follow up           2010          hosp fwup

 

                    follow up           2010          1mo fwup, saw Dr Danna du and hasn't gave cardiac clearance 

yet for HH repair, did have chemical stress test yesterday and waiting on 
results

 

                    follow up           2010          from EGD/colonoscopy

--has Hiatal Hernia and wants to do 

repair

 

                    follow up           2010           







Encounters





             Encounter    Performer    Location     Codes        Date

 

                                        ([id number]) OFFICE/OUTPATIENT VISIT EST

Diagnosis: Essential (primary) hypertension[ICD10: I10]

Diagnosis: Palpitations[ICD10: R00.2] Akanksha Xiangndangela        AKANKSHA SAramis IN[facility]UNN 

[place]                    CPT-4: 84706              2020

 

                                        (10615) OFFICE/OUTPATIENT VISIT EST

Diagnosis: Essential hypertension[ICD10: I10]

Diagnosis: Palpitations[ICD10: R00.2]

Diagnosis: Stress reaction[ICD10: F43.0] Akanksha Xiangrodo        AKANKSHA SAramis

 

KRISTEL [place]            CPT-4: 83015              2020

 

                                        (15392) NURSE/OUTPATIENT VISIT EST

Diagnosis: Mixed hyperlipidemia[ICD10: E78.2] Akanksha Xiangrodo GARCIAS SAramis 

KRISTEL [place]            CPT-4: 19390              2019

 

                                        (16535) NURSE/OUTPATIENT VISIT EST

Diagnosis: FLU VACCINE[ICD10: Z23] Akanksha HIGHTOWERLINE SAramis TJ KEY [place]                       CPT-4: 87014              10/22/2019

 

                                        (31387) NURSE/OUTPATIENT VISIT EST

Diagnosis: Chronic kidney disease, stage 1[ICD10: N18.1] Akanksha HIGHTOWERLINE SAramis KRISTEL [place] CPT-4: 28334              10/11/2019

 

                                        ([id number]) OFFICE/OUTPATIENT VISIT EST

Diagnosis: Acute serous otitis media, left ear[ICD10: H65.02] Nat HIGHTOWERLINE SAramis KRISTEL [facility] CPT-4: 25509              2019

 

                                        ([id number]) OFFICE/OUTPATIENT VISIT EST

Diagnosis: Essential (primary) hypertension[ICD10: I10]

Diagnosis: Coronary atherosclerosis due to calcified coronary lesion[ICD10: 
I25.84]

Diagnosis: Hypoglycemia, unspecified[ICD10: E16.2]

Diagnosis: Other fatigue[ICD10: R53.83]

Diagnosis: Urinary tract infection, site not specified[ICD10: N39.0] Akanksha BAUMAN OLIVIA DRIVER [facility] CPT-4: 55045        06/10/2019

 

                                        (51293) OFFICE/OUTPATIENT VISIT EST

Diagnosis: Essential (primary) hypertension[ICD10: I10]

Diagnosis: Gastro-esophageal reflux disease without esophagitis[ICD10: K21.9]

Diagnosis: Urinary tract infection, site not specified[ICD10: N39.0] Akanksha DRIVER DO [facility] CPT-4: 60617        2019

 

                                        (41799) OFFICE/OUTPATIENT VISIT EST

Diagnosis: Gastro-esophageal reflux disease without esophagitis[ICD10: K21.9]

Diagnosis: Epigastric pain[ICD10: R10.13] [facility]            CPT-4: 59570              2018

 

                                        (55429) OFFICE/OUTPATIENT VISIT EST

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

Diagnosis: Essential (primary) hypertension[ICD10: I10]

Diagnosis: Generalized anxiety disorder[ICD10: F41.1]

Diagnosis: Gastro-esophageal reflux disease without esophagitis[ICD10: K21.9] 

Akanksha DRIVER [employer] [facility] CPT-4: 28075        2018

 

                                        OFFICE/OUTPATIENT VISIT EST

Diagnosis: Gastro-esophageal reflux disease without esophagitis[ICD10: K21.9]

Diagnosis: Palpitations[ICD10: R00.2]

Diagnosis: Urinary tract infection, site not specified[ICD10: N39.0]

Diagnosis: Generalized anxiety disorder[ICD10: F41.1] Akanksha DRIVER [employer] [facility] CPT-4: 20302              10/02/2018

 

                                        (28938) NURSE/OUTPATIENT VISIT EST

Diagnosis: Coronary atherosclerosis due to calcified coronary lesion[ICD10: 
I25.84]

Diagnosis: Hypoglycemia, unspecified[ICD10: E16.2]

Diagnosis: Dizziness and giddiness[ICD10: R42]

Diagnosis: Occlusion and stenosis of bilateral carotid arteries[ICD10: I65.23] 

Akanksha DRIVER [employer] [facility] CPT-4: 35004        2018

 

                                        OFFICE/OUTPATIENT VISIT EST

Diagnosis: Epigastric pain[ICD10: R10.13]

Diagnosis: Generalized anxiety disorder[ICD10: F41.1]

Diagnosis: FLU VACCINE[ICD10: Z23] Akanksha KEY [facility]                       CPT-4: 64058              2018

 

                                        (94926) OFFICE/OUTPATIENT VISIT EST

Diagnosis: Essential (primary) hypertension[ICD10: I10]

Diagnosis: Hypoglycemia, unspecified[ICD10: E16.2]

Diagnosis: Gastro-esophageal reflux disease without esophagitis[ICD10: K21.9] 

[facility] 80935        2018

 

                                        (22574) OFFICE/OUTPATIENT VISIT EST

Diagnosis: Dizziness and giddiness[ICD10: R42] Nat HIGHTOWERL

[facility]            CPT-[facility]: 18764              2018

 

                                        (97877) OFFICE/OUTPATIENT VISIT EST

Diagnosis: Cervicalgia[ICD10: M54.2]

Diagnosis: Other spondylosis with radiculopathy, cervical region[ICD10: M47.22] 

[facility] CPT[facility]: 73424        2018

 

                                        (50557) OFFICE/OUTPATIENT VISIT EST

Diagnosis: Hypoglycemia, unspecified[ICD10: E16.2]

Diagnosis: Other spondylosis with radiculopathy, cervical region[ICD10: M47.22]

Diagnosis: Vertigo of central origin, bilateral[ICD10: H81.43]

Diagnosis: Cervicocranial syndrome[ICD10: M53.0] [facility]         CPT-4: 78117              2018

 

                                        ([id number]) OFFICE/OUTPATIENT VISIT EST

Diagnosis: Benign paroxysmal vertigo, bilateral[ICD10: H81.13]

Diagnosis: Otalgia, bilateral[ICD10: H92.03] Nat FAY

SH[facility]Y [facility]            CPT-4: 76372              2018

 

                                        (51698) OFFICE/OUTPATIENT VISIT EST

Diagnosis: Low back pain[ICD10: M54.5]

Diagnosis: Radiculopathy, lumbosacral region[ICD10: M54.17]

Diagnosis: Left lower quadrant pain[ICD10: R10.32] Akanksha DRIVER [facility]         CPT-4: 78141              2018

 

                                        (03928) OFFICE/OUTPATIENT VISIT EST

Diagnosis: FLU VACCINE[ICD10: Z23] Akanksha KEY [facility]                       CPT-4: 60568              10/06/2017

 

                                        (23148) OFFICE/OUTPATIENT VISIT EST

Diagnosis: Mixed hyperlipidemia[ICD10: E78.2]

Diagnosis: Essential (primary) hypertension[ICD10: I10]

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

Diagnosis: Impaired fasting glucose[ICD10: R73.01]

Diagnosis: Other fatigue[ICD10: R53.83] Akanksha DRIVER

[facility]                    CPT-4: 42742              2017

 

                                        (07018) OFFICE/OUTPATIENT VISIT EST

Diagnosis: Pain in thoracic spine[ICD10: M54.6]

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 

Akanksha DRIVER [facility] CPT-4: 97571        2017

 

                                        (94879) OFFICE/OUTPATIENT VISIT EST

Diagnosis: Left lower quadrant pain[ICD10: R10.32]

Diagnosis: Low back pain[ICD10: M54.5] Akanksha SYKES 

[facility]                    CPT-4: 66457              2017

 

                                        (32518) OFFICE/OUTPATIENT VISIT EST

Diagnosis: Mixed hyperlipidemia[ICD10: E78.2]

Diagnosis: Essential (primary) hypertension[ICD10: I10]

Diagnosis: Hypoglycemia, unspecified[ICD10: E16.2] Akanksha DRIVER [facility]         CPT-4: 74575              2017

 

                                        (59967) OFFICE/OUTPATIENT VISIT EST

Diagnosis: Impaired fasting glucose[ICD10: R73.01]

Diagnosis: Mastodynia[ICD10: N64.4]

Diagnosis: Mixed hyperlipidemia[ICD10: E78.2]

Diagnosis: Essential (primary) hypertension[ICD10: I10]

Diagnosis: Other fatigue[ICD10: R53.83] Akanksha DRIVER

[facility]                    CPT-4: 13808              2017

 

                                        (55696) OFFICE/OUTPATIENT VISIT EST

Diagnosis: Acute upper respiratory infection, unspecified[ICD10: J06.9] Luba DRIVER DO [facility] CPT-4: 06838        2017

 

                                        (20744) OFFICE/OUTPATIENT VISIT EST

Diagnosis: Acute mastoiditis without complications, right ear[ICD10: H70.001] 

Akanksha DRIVER DO [facility] CPT-4: 70352        2016

 

                                        (31716) OFFICE/OUTPATIENT VISIT EST

Diagnosis: FLU VACCINE[ICD10: Z23] Akanksha KEY [facility]                       CPT-4: 57639              10/07/2016

 

                                        (32832) OFFICE/OUTPATIENT VISIT EST

Diagnosis: Mixed hyperlipidemia[ICD10: E78.2]

Diagnosis: Essential (primary) hypertension[ICD10: I10]

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

Diagnosis: Impaired fasting glucose[ICD10: R73.01] Akanksha DRIVER [facility]         CPT-4: 63763              2016

 

                                        (11239) OFFICE/OUTPATIENT VISIT EST

Diagnosis: Constipation, unspecified[ICD10: K59.00] Akanksha DRIVER DO [facility] CPT-4: 38500              2016

 

                                        (64925) OFFICE/OUTPATIENT VISIT EST

Diagnosis: Essential (primary) hypertension[ICD10: I10]

Diagnosis: Mixed hyperlipidemia[ICD10: E78.2]

Diagnosis: Chronic kidney disease, stage 1[ICD10: N18.1] Akanksha DRIVER DO [facility] CPT-4: 90073              2016

 

                                        (62526) OFFICE/OUTPATIENT VISIT EST

Diagnosis: Muscle weakness (generalized)[ICD10: M62.81]

Diagnosis: Dizziness and giddiness[ICD10: R42]

Diagnosis: Essential (primary) hypertension[ICD10: I10]

Diagnosis: History of falling[ICD10: Z91.81] Akankshatammy FAY

SHERRY DRIVER [facility]            CPT-4: 24311              2015

 

                                        (73991) OFFICE/OUTPATIENT VISIT EST

Diagnosis: FLU VACCINE[ICD10: Z23] Akanksha HIGHTOWERLINE OLIVIA KEY [facility]                       CPT-4: 83789              10/16/2015

 

                                        (74044) OFFICE/OUTPATIENT VISIT EST

Diagnosis: Acute renal failure[ICD9: 584.9]

Diagnosis: POLYURIA[ICD9: 788.42] Akanksha BAUMAN SAramis GUERRERO LANCE [facility]                       CPT-4: 27013              09/15/2015

 

                                        (58740) OFFICE/OUTPATIENT VISIT EST

Diagnosis: HYPERLIPIDEMIA NEC/NOS[ICD9: 272.4]

Diagnosis: HYPERTENSION[ICD9: 401.9]

Diagnosis: CAD[ICD9: 414.00]

Diagnosis: Hyperglycemia[ICD9: 790.29]

Diagnosis: MALAISE AND FATIGUE[ICD9: 780.79] Akanksha TEIXEIRA SAramis 

KRISTEL [facility]            CPT-4: 38122              09/10/2015

 

                                        (18556) OFFICE/OUTPATIENT VISIT EST

Diagnosis: MALAISE AND FATIGUE[ICD9: 780.79]

Diagnosis: HYPOGLYCEMIA[ICD9: 251.2]

Diagnosis: Grieving[ICD9: 309.0]

Diagnosis: ABDOMINAL PAIN[ICD9: 789.00] Akanksha Xiangrodo BAUMAN SAramis 

KRISTEL

[facility]                    CPT-4: 31403              2015

 

                                        OFFICE/OUTPATIENT VISIT EST

Diagnosis: CERUMEN IMPACTION[ICD9: 380.4]

Diagnosis: EUSTACHIAN TUBE DYSFUNCTION[ICD9: 381.81] Bertha Elieser BAUMAN SAramis KRISTEL [facility] CPT-4: 65779              2015

 

                                        (83295) OFFICE/OUTPATIENT VISIT EST

Diagnosis: Leg pain[ICD9: 729.5]

Diagnosis: SUPERFIC PHLEBITIS-LEG[ICD9: 451.0] Akanksha ROBERTSON SAramis 

KRISTEL [facility]            CPT-4: 81749              2015

 

                                        (89241) OFFICE/OUTPATIENT VISIT EST

Diagnosis: Thoracic back pain[ICD9: 724.1]

Diagnosis: SPASM OF MUSCLE[ICD9: 728.85] Akanksha BAUMAN SAramis

 

KRITSEL [facility]            CPT-4: 20204              2015

 

                                        (45580) OFFICE/OUTPATIENT VISIT EST

Diagnosis: DIZZINESS/VERTIGO[ICD9: 780.4]

Diagnosis: Benign positional vertigo[ICD9: 386.11]

Diagnosis: HYPERTENSION[ICD9: 401.9]

Diagnosis: Suspicious nevus[ICD9: 238.2] Akanksha DRIVER [facility]            CPT-4: 01305              2015

 

                                        (22383) OFFICE/OUTPATIENT VISIT EST

Diagnosis: FLU VACCINE[ICD10: Z23] Akanksha SPENCER

[facility]                       CPT-4: 58047              10/17/2014

 

                                        OFFICE/OUTPATIENT VISIT EST

Diagnosis: SINUSITIS, ACUTE[ICD9: 461.9]

Diagnosis: EUSTACHIAN TUBE DYSFUNCTION[ICD9: 381.81] Bertha DRIVER [facility] CPT-4: 31172              2014

 

                                        (29886) OFFICE/OUTPATIENT VISIT EST

Diagnosis: DIZZINESS/VERTIGO[ICD9: 780.4]

Diagnosis: HYPERTENSION[ICD9: 401.9] [facility]                       CPT-4: 65975              2014

 

                                        (47134) OFFICE/OUTPATIENT VISIT EST

Diagnosis: HYPERTENSION[ICD9: 401.9]

Diagnosis: MALAISE AND FATIGUE[ICD9: 780.79]

Diagnosis: SINUSITIS, ACUTE[ICD9: 461.9] Akanksha DRIVER [facility]            CPT-4: 49844              2014

 

                                        (16066) OFFICE/OUTPATIENT VISIT EST

Diagnosis: HYPERLIPIDEMIA NEC/NOS[ICD9: 272.4]

Diagnosis: HYPERTENSION[ICD9: 401.9]

Diagnosis: B12 DEFIC ANEMIA NEC[ICD9: 281.1]

Diagnosis: HYPOGLYCEMIA[ICD9: 251.2] Akanksha MEJIA [facility]                       CPT-4: 72605              2014

 

                                        OFFICE/OUTPATIENT VISIT EST

Diagnosis: COUGH[ICD9: 786.2] Bertha DRIVER [facility] 

CPT-4: 58188                            2014

 

                                        OFFICE/OUTPATIENT VISIT EST

Diagnosis: COUGH[ICD9: 786.2]

Diagnosis: URI, ACUTE[ICD9: 465.9] Bertha SPENCER

[facility]                       CPT-4: 66837              10/15/2013

 

                                        (58661) OFFICE/OUTPATIENT VISIT EST

Diagnosis: HYPERTENSION[ICD9: 401.9]

Diagnosis: CEPHALGIA[ICD9: 784.0]

Diagnosis: ANXIETY STATE NOS[ICD9: 300.00]

Diagnosis: FLU VACCINE[ICD9: V04.81] [facility]                       CPT-4: 84399              2013

 

                                        (47565) OFFICE/OUTPATIENT VISIT EST

Diagnosis: DIZZINESS/VERTIGO[ICD9: 780.4]

Diagnosis: SINUSITIS, ACUTE[ICD9: 461.9]

Diagnosis: Benign positional vertigo[ICD9: 386.11] [facility]         CPT-4: 91628              2013

 

                                        OFFICE/OUTPATIENT VISIT EST

Diagnosis: OTITIS MEDIA NOS[ICD9: 382.9] Akanksha HIGHTOWERLINE [facility]IA [facility]            CPT-4: 16320              2013

 

                                        OFFICE/OUTPATIENT VISIT EST

Diagnosis: Perforation of ear drum[ICD9: 384.20]

Diagnosis: SINUSITIS, ACUTE[ICD9: 461.9] [facility]            CPT-4: 87814              05/10/2013

 

                                        (72980) OFFICE/OUTPATIENT VISIT EST

Diagnosis: Mastalgia[ICD9: 611.71] Akanksha Otoolendangela SPENCER

[facility]                       CPT-4: 84889              2013

 

                                        (79341) OFFICE/OUTPATIENT VISIT EST

Diagnosis: HYPERLIPIDEMIA NEC/NOS[ICD9: 272.4]

Diagnosis: HYPERTENSION[ICD9: 401.9]

Diagnosis: DIZZINESS/VERTIGO[ICD9: 780.4]

Diagnosis: Hyperglycemia[ICD9: 790.29]

Diagnosis: MALAISE AND FATIGUE[ICD9: 780.79] Akanksha TEIXEIRA SAramis 

[facility]            CPT-4: 42210              2012

 

                                        (16462) OFFICE/OUTPATIENT VISIT EST

Diagnosis: EUSTACHIAN TUBE DYSFUNCTION[ICD9: 381.81]

Diagnosis: DIZZINESS/VERTIGO[ICD9: 780.4]

Diagnosis: ABDOMINAL PAIN[ICD9: 789.00]

Diagnosis: GERD[ICD9: 530.81]

Diagnosis: CERUMEN IMPACTION[ICD9: 380.4] [facility]anksha [facility]            CPT-4: 38931              2012

 

                                        OFFICE/OUTPATIENT VISIT EST

Diagnosis: ABDOMINAL PAIN[ICD9: 789.00]

Diagnosis: DYSPEPSIA[ICD9: 536.8] Akanksha LANCE [facility]                       CPT-4: 82719              10/23/2012

 

                                        (32476) OFFICE/OUTPATIENT VISIT EST

Diagnosis: ABDOMINAL PAIN[ICD9: 789.00]

Diagnosis: DYSPEPSIA[ICD9: 536.8]

Diagnosis: IBS[ICD9: 564.1] Akanksha DRIVER [facility] CPT

-

4: 59761                                10/10/2012

 

                                        OFFICE/OUTPATIENT VISIT EST

Diagnosis: DIZZINESS/VERTIGO[ICD9: 780.4]

Diagnosis: HYPOGLYCEMIA[ICD9: 251.2] Akanksha MEJIA [facility]                       CPT-4: 33949              2012

 

                                        (88832) OFFICE/OUTPATIENT VISIT EST

Diagnosis: URINARY TRACT INFECTION[ICD9: 599.0] Akanksha DRIVER [facility]            CPT-4: 61112              2012

 

                                        (37895) OFFICE/OUTPATIENT VISIT EST

Diagnosis: HYPERLIPIDEMIA NEC/NOS[ICD9: 272.4]

Diagnosis: HYPERTENSION[ICD9: 401.9]

Diagnosis: MALAISE AND FATIGUE[ICD9: 780.79]

Diagnosis: DIZZINESS/VERTIGO[ICD9: 780.4] Akanksha [facility]            CPT-4: 04890              2012

 

                                        (68864) OFFICE/OUTPATIENT VISIT EST

Diagnosis: DIZZINESS/VERTIGO[ICD9: 780.4]

Diagnosis: MALAISE AND FATIGUE[ICD9: 780.79]

Diagnosis: HYPERTENSION[ICD9: 401.9] Akanksha MEJIA [facility]                       CPT-4: 55067              2012

 

                                        OFFICE/OUTPATIENT VISIT EST

Diagnosis: LUMB/LUMBOSAC DISC DEGEN[ICD9: 722.52]

Diagnosis: Radiculopathy of leg[ICD9: 724.4]

Diagnosis: SACROILIITIS NEC[ICD9: 720.2]

Diagnosis: SPINAL ENTHESOPATHY[ICD9: 720.1] Akanksha DRIVER [facility]            CPT-4: 43816              2012

 

                                        (61509) OFFICE/OUTPATIENT VISIT EST

Diagnosis: PAIN, LOWER BACK[ICD9: 724.2]

Diagnosis: SPASM OF MUSCLE[ICD9: 728.85]

Diagnosis: SCIATICA[ICD9: 724.3]

Diagnosis: Lumbar degenerative disc disease[ICD9: 722.52] Akanksha DRIVER [facility] CPT-4: 84293              2012

 

                                        (20663) OFFICE/OUTPATIENT VISIT EST

Diagnosis: PAIN IN THORACIC SPINE[ICD9: 724.1]

Diagnosis: SPASM OF MUSCLE[ICD9: 728.85] Akanksha Spencer        [facility]            CPT-4: 63023              2012

 

                                        (85115) OFFICE/OUTPATIENT VISIT EST

Diagnosis: PAIN, LOWER BACK[ICD9: 724.2]

Diagnosis: SPASM OF MUSCLE[ICD9: 728.85]

Diagnosis: Sacroiliac dysfunction[ICD9: 739.4]

Diagnosis: Lumbar degenerative disc disease[ICD9: 722.52] Akanksha CHEATHAMAramis [facility] CPT-4: 48025              2012

 

                                        OFFICE/OUTPATIENT VISIT EST

Diagnosis: MALAISE AND FATIGUE[ICD9: 780.79]

Diagnosis: HYPOTENSION[ICD9: 458.9]

Diagnosis: CAD[ICD9: 414.00] Akanksha CHEATHAMAramis [facility] 

CPT-4: 66846                            2012

 

                                        OFFICE/OUTPATIENT VISIT EST

Diagnosis: SINUSITIS, ACUTE[ICD9: 461.9]

Diagnosis: PHARYNGITIS, ACUTE[ICD9: 462]

Diagnosis: CERUMEN IMPACTION[ICD9: 380.4] Akankshazina HIGHTOWERLINE S

. 

[facility]            CPT-4: 64228              2011

 

                                        OFFICE/OUTPATIENT VISIT EST

Diagnosis: PAIN IN THORACIC SPINE[ICD9: 724.1]

Diagnosis: GERD[ICD9: 530.81]

Diagnosis: DIZZINESS/VERTIGO[ICD9: 780.4] Akanksha BAUMAN S

. 

[facility]            CPT-4: 37449              2011

 

                OFFICE/OUTPATIENT VISIT EST Akanksha BAUMAN S. [facility]

[facility] CPT-

4: 66823                                2011

 

                    (60371) OFFICE/OUTPATIENT VISIT EST Akanksha CASTILLO S. [facility] 

[facility]                       CPT-4: 57850              2011

 

                                        (17672) OFFICE/OUTPATIENT VISIT, EST

Diagnosis: PAIN, LOWER BACK[ICD9: 724.2] Akanksha BAUMAN S.

 

[facility]            CPT-4: 18432              2011

 

                    (37862) OFFICE/OUTPATIENT VISIT, EST Akanksha DE LA ROSA S. [facility]

[facility]                       CPT-4: 72459              2011

 

                    (02166) OFFICE/OUTPATIENT VISIT, EST Akanksha IZQUIERDOLINE S. [facility]

[facility]                       CPT-4: 67286              2010

 

                    (71003) OFFICE/OUTPATIENT VISIT, EST Akanksha DE LA ROSA S. [facility]

[facility]                       CPT-4: 91325              2010

 

                    (42638) OFFICE/OUTPATIENT VISIT, EST Akanksha DE LA ROSA S. [facility]

[facility]                       CPT-4: 57768              2010

 

                    (37196) OFFICE/OUTPATIENT VISIT, EST Akanksha IZQUIERDOLINE S. [facility]

[facility]                       CPT-4: 40651              2010

 

                    (59585) OFFICE/OUTPATIENT VISIT, EST Akanksha Orendangela DE LA ROSA S. [facility]

[facility]                       CPT-4: 22385              2010

 

                    (81571) OFFICE/OUTPATIENT VISIT, EST Akanksha DE LA ROSA S. [facility]

[facility]                       CPT-4: 34595              2010







Plan of Care





             Planned Activity Notes        Codes        Status       Date

 

                          Visit Diagnosis Plan: Essential (primary) hypertension

 Discussion: Improving 

with higher dose of metoprolol Recheck 2weeks

                                        ICD-9 : 401.9

ICD-10 : I10

                                                    2020

 

                          Visit Diagnosis Plan: Palpitations Discussion: Continu

e higher dose of 

metoprolol

                                        ICD-9 : 785.1

ICD-10 : R00.2

                                                    2020

 

                                        Appointment: Akanksha Driver

WPtel:[phone]

                                        [address] [place] [address]                                              FOLLOW UP       2020

 

                                        Appointment: Akanksha Driver

WPtel:[phone]

                                        [address] [place] [address]              2020--moved up to Tues 20 at 4pm (km)                 [place]

[facility]        [facility]

 

                          Visit Diagnosis Plan: Palpitations Discussion: Check C

BC, CMP, TSH

                                        ICD-9 : 785.1

ICD-10 : R00.2

                                                    2020

 

                          Visit Diagnosis Plan: Essential hypertension Discussio

n: Increase metoprolol to 

25mg q AM and 50mg po q pM Recheck 1 week

                                        ICD-9 : 401.9

ICD-10 : I10

                                                    2020

 

                                        Appointment: Akanksha Drivertel:[phone]

                                        [address] [place] [address]                                              BP CHECK        2020

 

                                        Appointment: Akanksha Driver

WPtel:[phone]

                                        [address] [place] [address]                                              ACUTE ILLNESS   2020

 

                          Patient Education: metoprolol tartrate- [employer] Cou

[facility] [id number] 

[email or web address]

                                        Completed           2020

 

                    Care Plan: X-RAY EXAM NECK SPINE 4/5VWS                     

LOINC : [id number]

                          Pending                   2020

 

                    Care Plan: X-RAY EXAM THORAC SPINE4/>VW                     

LOINC : 52113-6

                          Pending                   2020

 

                                        Appointment: Akanksha Driver

WPtel:[phone]

                                        [address] [place], [place] [id number]                                              LAB             2019

 

                                        Appointment: Akanksha Driver

WPtel:[phone]

                                        [address] [place], [place] [id number]

US                                              INJECTION       10/22/2019

 

             Patient Education: INFLUENZA VACCINE CDC                           

Completed    10/22/2019

 

                                        Appointment: Akanksha Driver

WPtel:[phone]

                                        [address] [place], [place] [id number]                                              LAB             10/11/2019

 

                          Visit Diagnosis Plan: Acute serous otitis media, left 

ear Discussion: instructed

to use flonase and claritin daily for symptom relief. discussed with patient 
that if no improvement in 2 weeks, will need to follow up with ENT for audiology
exam. instructed to call office with any other symptoms such as otalgia, 
dysphagia, fever, etc.

                                        ICD-9 : 381.01

ICD-10 : H65.02

                                                    2019

 

                                        Appointment: Nat Lozoya

                                        [address] [place] [address]                                              ACUTE ILLNESS   2019

 

                          Visit Diagnosis Plan: Urinary tract infection, site no

t specified Discussion: 

Started an old abx prescription

                                        ICD-9 : 599.0

ICD-10 : N39.0

                                                    06/10/2019

 

                          Visit Diagnosis Plan: Hypoglycemia, unspecified Discus

madeleine: Monitor BS At least 

6 small meals a day each with protein

                                        ICD-9 : 251.2

ICD-10 : E16.2

                                                    06/10/2019

 

                          Visit Diagnosis Plan: Essential (primary) hypertension

 Discussion: Stable Check 

CMP

                                        ICD-9 : 401.9

ICD-10 : I10

                                                    06/10/2019

 

                          Visit Diagnosis Plan: Other fatigue Discussion: Check 

CBC, TSH

                                        ICD-9 : 780.79

ICD-10 : R53.83

                                                    06/10/2019

 

                                        Appointment: Akanksha Driver

WPtel:[phone]

                                        [address] [place] [address]                                              FOLLOW UP       06/10/2019

 

                          Visit Diagnosis Plan: Essential (primary) hypertension

 Discussion: Stable Sees 

Dr. Clements this month

                                        ICD-9 : 401.9

ICD-10 : I10

                                                    2019

 

                          Visit Diagnosis Plan: Urinary tract infection, site no

t specified Discussion: 

Macrobid 100mg po BID for 1 week

                                        ICD-9 : 599.0

ICD-10 : N39.0

                                                    2019

 

                          Visit Diagnosis Plan: Gastro-esophageal reflux disease

 without esophagitis 

Discussion: Stable on omeprazole

Follow Up: 3 months

                                        ICD-9 : 530.81

ICD-10 : K21.9

                                                    2019

 

                                        Appointment: Akanksha Driver

WPtel:[phone]

                                        [address] [place], [place] [address]

[place]                                              FOLLOW UP       2019

 

                          Patient Education: metoprolol tartrate- [employer] [facility] [id number] 

[email or web address]

                                        Completed           2019

 

                                        Appointment: Akanksha Driver

WPtel:[phone]

                                        [address] [place], [place] [address]

[place]                                              CANCELED        02/15/2019

 

                          Visit Diagnosis Plan: Gastro-esophageal reflux disease

 without esophagitis 

Discussion: Continue protonix and carafate Proceed with updated EGD

                                        ICD-9 : 530.81

ICD-10 : K21.9

                                                    2018

 

                          Visit Diagnosis Plan: Epigastric pain Discussion: [facility] CT abdomen/pelvis due to

ongoing abdominal pain

                                        ICD-9 : 789.06

ICD-10 : R10.13

                                                    2018

 

                                        Appointment: Akanksha Driver

WPtel:[phone]

                                        [address] [place], [place] [address]

[place]                                              FOLLOW UP       2018

 

                    Care Plan: CT PELVIS W/O DYE                     LOINC : 361

08-9

                          Pending                   2018

 

                    Care Plan: CT ABDOMEN W/O DYE                     LOINC : 36

103-0

                          Pending                   2018

 

                    Care Plan: Referral Order                     SNOMED-CT : 30

[id number]

                          Pending                   2018

 

                                        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

art disease of native coronary artery 

without angina pectoris                 Discussion: S/P cardiac cath--doing medi

vicki management 

with imdur and metoprolol increased Has fwup with cardiology next week Discussed
cardiac rehab

                                        ICD-9 : 414.00

ICD-10 : I25.10

                                                    2018

 

                          Visit Diagnosis Plan: Generalized anxiety disorder Dis

cussion: Stable

                                        ICD-9 : 300.00

ICD-10 : F41.1

                                                    2018

 

                          Visit Diagnosis Plan: Gastro-esophageal reflux disease

 without esophagitis 

Discussion: Continue protonix at BID dosing and carafate BID

Follow Up: 2 months

                                        ICD-9 : 530.81

ICD-10 : K21.9

                                                    2018

 

                          Visit Diagnosis Plan: Essential (primary) hypertension

 Discussion: Stable

                                        ICD-9 : 401.9

ICD-10 : I10

                                                    2018

 

                                        Appointment: Akanksha Driver

WPtel:[phone]

                                        [address] [place], [place] [id number]

                                              FOLLOW UP       2018

 

                          Visit Diagnosis Plan: Gastro-esophageal reflux disease

 without esophagitis 

Discussion: Continue protonix at BID dosing Continue carafate at q AC and HS 
dosing for 2 more weeks then decrease to BID dosing

Follow Up: 4 weeks

                                        ICD-9 : 530.81

ICD-10 : K21.9

                                                    10/02/2018

 

                          Visit Diagnosis Plan: Urinary tract infection, site no

t specified Discussion: 

Reculture urine

                                        ICD-9 : 599.0

ICD-10 : N39.0

                                                    10/02/2018

 

                          Visit Diagnosis Plan: Generalized anxiety disorder Dis

cussion: Increase xanax to

BID dosing especially with upcoming cardiac testing which is already making 
patient more anxious and worried

                                        ICD-9 : 300.00

ICD-10 : F41.1

                                                    10/02/2018

 

                          Visit Diagnosis Plan: Palpitations Discussion: Cardilo

logy going to schedule 

Lexiscan

                                        ICD-9 : 785.1

ICD-10 : R00.2

                                                    10/02/2018

 

                                        Appointment: Akanksha Driver

WPtel:[phone]

                                        [address] [place], [place] [address]

                                              FOLLOW UP       10/02/2018

 

             Patient Education: Patient Medication Summary                      

     Completed    10/02/2018

 

                                        Appointment: Akanksha Drivertel:[phone]

                                        [address] [place], [place] [address]

[place]                                              LAB             2018

 

             Patient Education: Patient Medication Summary                      

     Completed    2018

 

                          Visit Diagnosis Plan: Epigastric pain Discussion: Cont

inue protonix and carafate

but make into a slurry Flu shot given Discussed EGD if symptoms persist

Follow Up: 2 weeks

                                        ICD-9 : 789.06

ICD-10 : R10.13

                                                    2018

 

                          Visit Diagnosis Plan: Generalized anxiety disorder Dis

cussion: Did discuss 

increased risk of benzodiazepines causing dementia but at this time will stay at
xanax 0.25mg po BID

                                        ICD-9 : 300.00

ICD-10 : F41.1

                                                    2018

 

                                        Appointment: Akanksha Driver

WPtel:[phone]

                                        [address] [place] [address]                                              FOLLOW UP       2018

 

             Patient Education: Patient Medication Summary                      

     Completed    2018

 

                          Visit Diagnosis Plan: Essential (primary) hypertension

 Discussion: Has 

hydralazine to use prn per cardiology Going to do 30 day holter monitor

                                        ICD-9 : 401.9

ICD-10 : I10

                                                    2018

 

                          Visit Diagnosis Plan: Hypoglycemia, unspecified Discus

madeleine: 6 small meals a 

day--each with protein Increase fluid intake

                                        ICD-9 : 251.2

ICD-10 : E16.2

                                                    2018

 

                          Visit Diagnosis Plan: Gastro-esophageal reflux disease

 without esophagitis 

Discussion: Change to protonix 40mg po BID

Follow Up: 1 months

                                        ICD-9 : 530.81

ICD-10 : K21.9

                                                    2018

 

                                        Appointment: Akanksha Driver

WPtel:[phone]

                                        [address] [place] [address]                                              ACUTE ILLNESS   2018

 

             Patient Education: Patient Medication Summary                      

     Completed    2018

 

                          Visit Diagnosis Plan: Dizziness and giddiness Discussi

on: blood work to be 

completed in office including cmp, cbc, troponin to rule out glucose 
intolerance, infection, dehydration. instructed patient that she needs to see 
her cardiologist sooner than oct and patient requested we contact dr. clements's 
office. will have front office make appt. patient has carotid doppler annually.

                                        ICD-9 : 780.4

ICD-10 : R42

                                                    2018

 

                                        Appointment: Nat Lozoya

                                        [address] [place] [address]                                              ACUTE ILLNESS   2018

 

             Patient Education: Patient Medication Summary                      

     Completed    2018

 

                          Visit Diagnosis Plan: Cervicalgia Discussion: Complete

 course of PT since 

symptoms improved Continue stretching exercises Notify if symptoms return or 
worsen

                                        ICD-9 : 723.1

ICD-10 : M54.2

                                                    2018

 

                                        Appointment: Akanksha Driver

WPtel:[phone]

                                        [facility]

                                              FOLLOW UP       2018

 

             Patient Education: Patient Medication Summary                      

     Completed    2018

 

                          Visit Diagnosis Plan: Hypoglycemia, unspecified Discus

madeleine: Eat something with 

protein every 2 hrs

                                        ICD-9 : 251.2

ICD-10 : E16.2

                                                    2018

 

                          Visit Diagnosis Plan: Other spondylosis with radiculop

athy, cervical region 

Discussion: Check MRI of cervical spine

                                        ICD-9 : 721.0

ICD-10 : M47.22

                                                    2018

 

                          Visit Diagnosis Plan: Vertigo of central origin, bilat

eral Discussion: Discussed

PT for vestibular therapy

                                        ICD-9 : 386.2

ICD-10 : H81.43

                                                    2018

 

                                        Appointment: Akanksha Driver

WPtel:[phone] [address] [facility]                                                              2018

 

             Patient Education: Patient Medication Summary                      

     Completed    2018

 

                    Care Plan: MRI NECK SPINE W/O DYE                     [facility] 

: [id number]

                          Pending                   2018

 

                          Visit Diagnosis Plan: Otalgia, bilateral Discussion: k

enalog 40 mg given to 

patient im. instructed patient to monitor blood sugar at home due to risk of 
increased sugar. instructed patient to rtc on wednesday if no improvement and 
may require antibiotic.

                                        ICD-9 : 388.70

ICD-10 : H92.03

                                                    2018

 

                          Visit Diagnosis Plan: Benign paroxysmal vertigo, bilat

eral Discussion: patient 

has meclizine at home but states it makes her too tired. instructed patient to 
cut in half and take at night. if it doesn't cause too much drowsiness, 
instructed to take bid until feeling better. instructed to rtc wed if no 
improvement or worsening symptoms. instructed patient to increase fluid intake 
as well.

                                        ICD-9 : 386.11

ICD-10 : H81.13

                                                    2018

 

                                        Appointment: Nat Lozoya

                                        [address] [facility]                                              ACUTE ILLNESS   2018

 

             Patient Education: Patient Medication Summary                      

     Completed    2018

 

                          Visit Diagnosis Plan: Left lower quadrant pain Discuss

ion: Culture urine Notify 

if worsens

                                        ICD-9 : 789.04

ICD-10 : R10.32

                                                    2018

 

                          Visit Diagnosis Plan: Low back pain Discussion: Stretc

hes Topical aspercreme 

Tylenol 1 po TID

                                        ICD-9 : 724.2

ICD-10 : M54.5

                                                    2018

 

                          Visit Diagnosis Plan: Radiculopathy, lumbosacral regio

n Discussion: As above

                                        ICD-9 : 724.4

ICD-10 : M54.17

                                                    2018

 

                                        Appointment: Akanksha Driver

WPtel:[phone]

                                        [address] [place], [place] [address]                                              ACUTE ILLNESS   2018

 

             Patient Education: Patient Medication Summary                      

     Completed    2018

 

                                        Appointment: Akanksha Driver

WPtel:[phone]

                                        [address] [place], [place] [address]

[place]                                              INJECTION       10/06/2017

 

             Patient Education: Patient Medication Summary                      

     Completed    10/06/2017

 

                                        Appointment: Akanksha Driver

WPtel:[phone]

                                        [address] [place], [place] [address]

                                              LAB             2017

 

             Patient Education: Patient Medication Summary                      

     Completed    2017

 

                          Visit Diagnosis Plan: Pain in thoracic spine Discussio

n: PT has helped Continue 

stretches and walking Discussed joining [facility] to continue exercise

                                        ICD-9 : 724.1

ICD-10 : M54.6

                                                    2017

 

                          Visit Diagnosis Plan: Other intervertebral disc degene

ration, lumbar region 

Follow Up: 3 months

                                        ICD-9 : 722.52

ICD-10 : M51.36

                                                    2017

 

                                        Appointment: Akanksha Driver

WPtel:[phone]

                                        [address] [place], [place] [address]

                                              FOLLOW UP       2017

 

             Patient Education: Patient Medication Summary                      

     Completed    2017

 

                          Visit Diagnosis Plan: Low back pain Discussion: Start 

PT for low back- Seems 

like abdominal pain is radiating from low back

Follow Up: 5 weeks

                                        ICD-9 : 724.2

ICD-10 : M54.5

                                                    2017

 

                          Visit Diagnosis Plan: Left lower quadrant pain Discuss

ion: Had CT scan of 

abdomen/pelvis in 2017 and normal colonoscopy in 2015

                                        ICD-9 : 789.04

ICD-10 : R10.32

                                                    2017

 

                                        Appointment: Akanksha Drivertel:[phone]

                                        [address] [place] [address]                                              ACUTE ILLNESS   2017

 

             Patient Education: Patient Medication Summary                      

     Completed    2017

 

                                        Appointment: Akanksha Driver

WPtel:[phone]

                                        [address] [place] [address]                                              LAB             2017

 

             Patient Education: Patient Medication Summary                      

     Completed    2017

 

                          Visit Diagnosis Plan: Mixed hyperlipidemia Discussion:

 Check lipids

                                        ICD-9 : 272.4

ICD-10 : E78.2

                                                    2017

 

                          Visit Diagnosis Plan: Impaired fasting glucose Discuss

ion: Return in AM for CMP,

HbA1C Accuchecks daily Diet/Exercise discussed at length

                                        ICD-9 : 790.29

ICD-10 : R73.01

                                                    2017

 

                          Visit Diagnosis Plan: Other fatigue Discussion: Check 

CBC, TSH

                                        ICD-9 : 780.79

ICD-10 : R53.83

                                                    2017

 

                          Visit Diagnosis Plan: Mastodynia Discussion: Check Jace

ateral diagnostic 

mammogram with US

                                        ICD-9 : 611.71

ICD-10 : N64.4

                                                    2017

 

                                        Appointment: Akanksha Drivertel:[phone]

                                        [address] [place] [address]              3/ confirmed `sl                 ACUTE ILLNESS   2017

 

             Patient Education: Patient Medication Summary                      

     Completed    2017

 

                    Care Plan: MAMMOGRAM SCREENING                     [facility] : 2

[id number]

                          Pending                   2017

 

                          Visit Diagnosis Plan: Acute upper respiratory infectio

n, unspecified Discussion:

Exam is reassuring Likely viral illness Supportive care reviewed and encouraged 
Follow up PRN

                                        ICD-9 : 465.9

ICD-10 : J06.9

                                                    2017

 

                                        Appointment: Luba Stevenson 

                                        [address] [place] [address]                                              ACUTE ILLNESS   2017

 

             Patient Education: Patient Medication Summary                      

     Completed    2017

 

                          Visit Plan:               Supportive care. Rest, Fluid

s, Tylenol/Motrin prn fever or 

bodyaches. Notify if worsening symptoms.

                                                            2016

 

                                        Appointment: Akanksha Driver

WPtel:[phone]

                                        [address] [place], [place] [id number]

[place]                                              ACUTE ILLNESS   2016

 

             Patient Education: Patient Medication Summary                      

     Completed    2016

 

                                        Appointment: Akanksha Driver

WPtel:[phone]

                                        [address] [facility]

[place]                                              INJECTION       10/07/2016

 

             Patient Education: Patient Medication Summary                      

     Completed    10/07/2016

 

                                        Appointment: Akanksha Driver

WPtel:[phone]

                                        [address] [place] [address]

[place]                                              LAB             2016

 

             Patient Education: Patient Medication Summary                      

     Completed    2016

 

                          Visit Plan:               Add miralax daily Use daily 

probiotic and metamucil and push fluids

Notify if worsens/persists

                                                            2016

 

                                        Appointment: Akanksha Driver

WPtel:[phone]

                                        [address] [place] [address]              16 confirmed ~sl                 ACUTE ILLNESS   2016

 

             Patient Education: Patient Medication Summary                      

     Completed    2016

 

                          Visit Plan:               No NSAIDs Kidney function di

scussed Discussed hydration Monitor lab

every 4months so will check CMP, HbA1C next month

                                                            2016

 

                                        Appointment: Akanksha Driver

WPtel:[phone]

                                        [address] [place] [address]              03/15 confirmed ~sl                 ACUTE ILLNESS   2016

 

             Patient Education: Patient Medication Summary                      

     Completed    2016

 

                          Visit Plan:               Vestibular exercises Refill 

flonase Strict low Na diet--discussed 

that needs to read labels Rx for walker with chair given due to muscle 
weakness/unsteadiness Has carotids and abdominal aorta and legs checked in 
January Check Chem 7

                                                            2015

 

                                        Appointment: Akanksha Driver

WPtel:[phone]

                                        [address] [place] [address]              12/15/15 appt confirmed cn                 FOLLOW UP       

 

             Patient Education: Patient Medication Summary                      

     Completed    2015

 

             Patient Education: Vertigo                           Completed    1

2015

 

                                        Appointment: Akanksha Driver

WPtel:[phone] [address]

[id number]

US                                              INJECTION       10/16/2015

 

             Patient Education: Patient Medication Summary                      

     Completed    10/16/2015

 

                                        Appointment: Akanksha Driver

WPtel:[phone]

                                        [address] [place], [place] [id number]

                                              UA              09/15/2015

 

             Patient Education: Patient Medication Summary                      

     Completed    09/15/2015

 

                                        Referral: Landon De La Torre

WPtel:[phone]

[facility]

[address]              Referral                        Completed       2015

 

                                        Appointment: Akanksha Driver

WPtel:[phone]

                                        [address] [place] [address]                                              LAB             09/10/2015

 

             Patient Education: Patient Medication Summary                      

     Completed    09/10/2015

 

                          Visit Plan:               Check CMP, CBC, TSH, Free T4

, B12, Lipids, HbA1C Discussed 6 small 

meals a day each with protein Would likely benefit from antidepressant Update 
colonoscopy

                                                            2015

 

                                        Appointment: Akanksha Driver

WPtel:[phone]

                                        [address] [place], [place] [address]              9/8/15 confirmed                 ACUTE ILLNESS   2015

 

             Patient Education: Patient Medication Summary                      

     Completed    2015

 

                          Visit Plan:               Cerumen flush with warm wate

r and peroxide - good results Resume 

Nasonex nasal spray daily Recommended Debrox earwax removal drops

                                                            2015

 

                                        Appointment: Bertha Mon

WPtel:[phone]

                                        [address] [place] [address]                                              ACUTE ILLNESS   2015

 

             Patient Education: Patient Medication Summary                      

     Completed    2015

 

                          Visit Plan:               Continue aspirin daily Add m

eloxicam for 1week Elevate and Ice Call

on 2015

 

                                        Appointment: Akanksha Driver

WPtel:[phone]

                                        [address] [place] [address]                                              ACUTE ILLNESS   2015

 

             Patient Education: Patient Medication Summary                      

     Completed    2015

 

                          Visit Plan:               Been using baclofen at [facility] and has helped some PT for next 

2-4weeks

                                                            2015

 

                                        Appointment: Akanksha Driver

WPtel:[phone]

                                        [address] [place], [place] [address]                                              Hospital Follow Up 2015

 

             Patient Education: Patient Medication Summary                      

     Completed    2015

 

                                        Appointment: Akanksha Driver

WPtel:[phone]

                                        [address] [place], [place] [address]                                              LAB             2015

 

                                        Appointment: Akanksha Driver

WPtel:[phone]

                                        [address] [place], [place] [address]              feeling better, weather -                 FOLLOW UP       

 

                                        Referral: Landon De La Torre

WPtel:[phone]

[facility] [employer]

Henri LEE

[id number]

[place]              Referral                        Appointment Requested 2015

 

                          Visit Plan:               Continue exercise and curren

t meds See surgery for removal of right

arm lesion

                                                            2015

 

                                        Appointment: Akanksha Driver

WPtel:[phone]

                                        [address] [place] [address]                                              FOLLOW UP       2015

 

             Patient Education: Patient Medication Summary                      

     Completed    2015

 

                                        Appointment: Akanksha Driver

WPtel:[phone]

                                        [address] [place], [place] [id number]                                              INJECTION       10/17/2014

 

             Patient Education: Patient Medication Summary                      

     Completed    10/17/2014

 

                                        Appointment: Bertha Mon

WPtel:[phone]

                                        [address] [place] [address]                                              ACUTE ILLNESS   2014

 

             Patient Education: Patient Medication Summary                      

     Completed    2014

 

                          Visit Plan:               Discussed that likely lumbar

 etiology for leg weakness Will change 

amlodopine to low dose dyazide and see if helps legs and inner ear

                                                            2014

 

                                        Appointment: Akanksha Driver

WPtel:[phone]

                                        [address] [place], [place] [id number]

                                              FOLLOW UP       2014

 

             Patient Education: Patient Medication Summary                      

     Completed    2014

 

                          Visit Plan:               Ceftin and continue claritin

/flonase Decrease amlodopine to 2.5mg q

HS until fwup with Card due to weakness

                                                            2014

 

                                        Appointment: Akanksha Driver

WPtel:[phone]

                                        [address] [place] [address]                                              ACUTE ILLNESS   2014

 

             Patient Education: Patient Medication Summary                      

     Completed    2014

 

                                        Appointment: Akanksha Driver

WPtel:[phone]

                                        [address] [place] [address]                                              LAB             2014

 

             Patient Education: Patient Medication Summary                      

     Completed    2014

 

                                        Appointment: Bertha Mon

WPtel:[phone]

                                        [address] [place] [address]                                              ACUTE ILLNESS   2014

 

             Patient Education: Patient Medication Summary                      

     Completed    2014

 

                          Visit Plan:               Supportive care. Rest, Fluid

s, Tylenol prn fever or bodyaches. 

Notify if worsening symptoms. Ceftin

                                                            10/15/2013

 

                                        Appointment: Bertha Mon

WPtel:[phone]

                                        [address] [place] [address]                                              ACUTE ILLNESS   10/15/2013

 

             Patient Education: Patient Medication Summary                      

     Completed    10/15/2013

 

                                        Appointment: Akanksha Driver

WPtel:[phone]

                                        [address] [place] [address]                                              BP CHECK        2013

 

             Patient Education: Patient Medication Summary                      

     Completed    2013

 

                                        Appointment: Akanksha Driver

WPtel:[phone]

                                        [address] [place] [address]                                              ER Follow UP    2013

 

             Patient Education: Patient Medication Summary                      

     Completed    2013

 

                          Visit Plan:               Restart flonase BID Use mecl

izine 25mg q HS Vestibular exercises 

Claritin 10mg q AM See ENT if doesn't resolve

                                                            2013

 

                                        Appointment: Akanksha Driver

WPtel:[phone]

                                        [address] [place] [address]                                              ACUTE ILLNESS   2013

 

             Patient Education: Patient Medication Summary                      

     Completed    2013

 

                          Visit Plan:               Will continue to observe

                                                            2013

 

                                        Appointment: Akanksha Driver

WPtel:[phone]

                                        [address] [place] [address]                                              FOLLOW UP       2013

 

             Patient Education: Patient Medication Summary                      

     Completed    2013

 

                          Visit Plan:               ERx for Augmentin 875mg q12 

x 10 days and Floxin otic drops 5 gtts 

AD x7days Sample of Nasonex per pt request Discussed treatment (nasal saline, 
salt water gargles, keeping right ear clean and dry, for worsening go to UC on 
weekend, Tylenol/ibuprofen, etc.) RTC 2 weeks for ear recheck.

                                                            05/10/2013

 

                                        Appointment: Jill Jean 

WPtel:[phone]

                                        [address] [place] [address]                                              ACUTE ILLNESS   05/10/2013

 

             Patient Education: Patient Medication Summary                      

     Completed    05/10/2013

 

                          Visit Plan:               Decrease caffeine intake Marina

ck Bilateral Mammogram with US of left 

breast

                                                            2013

 

                                        Appointment: Akanksha Driver

WPtel:[phone]

                                        [address] [place] [address]                                              ACUTE ILLNESS   2013

 

             Patient Education: Patient Medication Summary                      

     Completed    2013

 

                                        Appointment: Kate Hall

WPtel:[phone]

                                        [address] [place] [address]              appt scheduled                  ACUTE ILLNESS   2013

 

                                        Appointment: Akanksha Driver

WPtel:[phone]

                                        [address] [place] [address]

[place]                                              LAB             2012

 

             Patient Education: Patient Medication Summary                      

     Completed    2012

 

                          Visit Plan:               Continue off carafate and se

e how does Continue probiotic Add 

Vestibular exercises and use meclizine prn Continue Nasonex Check fasting lab 
including CMP, Lipids, CBC, TSH, Free T4, HbA1C

                                                            2012

 

                                        Appointment: Akanksha Driver

WPtel:[phone]

                                        [address] [place] [address]                                              FOLLOW UP       2012

 

             Patient Education: Patient Medication Summary                      

     Completed    2012

 

                          Visit Plan:               Continue carafate for 4more 

weeks at current dose then decrease to 

BID for 2wks then q HS

                                                            10/23/2012

 

                                        Appointment: Akanksha Drivertel:[phone]

                                        [address] [place] [address]                                              FOLLOW UP       10/23/2012

 

             Patient Education: Patient Medication Summary                      

     Completed    10/23/2012

 

                          Visit Plan:               Continue omeprazole Add Ellyn

fate 1gm po q AC Add daily probiotic

                                                            10/10/2012

 

                                        Appointment: Akanksha Driver

WPtel:[phone]

                                        [address] [place] [address]                                              ACUTE ILLNESS   10/10/2012

 

             Patient Education: Patient Medication Summary                      

     Completed    10/10/2012

 

                                        Appointment: Akanksha Driver

WPtel:[phone]

                                        [address] [place] [address]                                              INJECTION       2012

 

             Patient Education: Patient Medication Summary                      

     Completed    2012

 

                          Visit Plan:               Diabetic Diet Accuchecks BID

 alternating times Hold onglyza and 

Januvia for now and continue diet and exercise and weight loss and moniter BS 
Discussed hypoglycemia and snack of peanut butter and crackers with juice or 
milk

                                                            2012

 

                                        Appointment: Akanksha Driver

WPtel:[phone]

                                        [address] [place] [address]                                              WORK IN         2012

 

             Patient Education: Patient Medication Summary                      

     Completed    2012

 

                                        Appointment: Akanksha Driver

WPtel:[phone]

                                        [address] [place], [place] [id number]

[facility]              2012

 

             Patient Education: Patient Medication Summary                      

     Completed    2012

 

                                        Appointment: Akanksha Driver

WPtel:[phone]

                                        [address] [place], [place] [address]                                              LAB             2012

 

             Patient Education: Patient Medication Summary                      

     Completed    2012

 

                                        Appointment: Akanksha Driver

WPtel:[phone]

                                        [address] [place], [place] [address]                                              ACUTE ILLNESS   2012

 

             Patient Education: Patient Medication Summary                      

     Completed    2012

 

                          Visit Plan:               Injection to Right SI joint 

as above Pt will call in 3 days on pain

Has PT starting in 2wks.

                                                            2012

 

                                        Appointment: Akanksha Drivertel:[phone]

                                        [address] [place] [address]                                              ACUTE ILLNESS   2012

 

             Patient Education: Patient Medication Summary                      

     Completed    2012

 

                          Visit Plan:               OMT done Start PT May need u

pdated MRI if need to consider epidural

Prednisone

                                                            2012

 

                                        Appointment: Akanksha Driver

WPtel:[phone]

                                        [address] [place] [address]                                              OMT             2012

 

             Patient Education: Patient Medication Summary                      

     Completed    2012

 

                          Visit Plan:               Flexeril and Vimovo OMT done

 Daily stretches

                                                            2012

 

                                        Appointment: Akanksha Driver

WPtel:[phone]

                                        [address] [place] [address]                                              ACUTE ILLNESS   2012

 

             Patient Education: Patient Medication Summary                      

     Completed    2012

 

                          Visit Plan:               OMT done Daily stretches Vinod

st heat or biofreeze Right SI joint 

injection Call in 1week Vimovo BID

                                                            2012

 

                                        Appointment: Akanksha Drivertel:[phone]

                                        [address] [place] [address]                                              ACUTE ILLNESS   2012

 

             Patient Education: Patient Medication Summary                      

     Completed    2012

 

                                        Appointment: Akanksha Drivertel:[phone]

                                        [address] [place] [address]                                              LAB             2012

 

             Patient Education: Patient Medication Summary                      

     Completed    2012

 

                          Visit Plan:               Decrease amlodopine to 1.25m

g daily Schedule with Cardiology 

Fasting lab in AM

                                                            2012

 

                                        Appointment: Akanksha Drivertel:[phone]

                                        [address] [place] [address]                                              ACUTE ILLNESS   2012

 

             Patient Education: Patient Medication Summary                      

     Completed    2012

 

                          Visit Plan:               Saline nasal flushes prn. Ty

lenol/Motrin prn headache. Notify if 

persists/symptoms worsening. Cerumen removal from ears as above

                                                            2011

 

                                        Appointment: Akanksha Driver

WPtel:[phone]

                                        [address] [place] [address]                                              ACUTE ILLNESS   2011

 

             Patient Education: Patient Medication Summary                      

     Completed    2011

 

                                        Appointment: Akanksha Driver

WPtel:[phone]

                                        [address] [place] [address]

[place]                                              INJECTION       10/05/2011

 

             Patient Education: Patient Medication Summary                      

     Completed    10/05/2011

 

                          Visit Plan:               Continue vimovo for 1more we

ek Increase Omeprazole to BID for 1mo 

then resume QD if stomach improved Vestibular exercises with meclizine q HS for 
next week

                                                            2011

 

                                        Appointment: Akanksha Driver

WPtel:[phone]

                                        [address] [place] [address]                                              FOLLOW UP       2011

 

             Patient Education: Patient Medication Summary                      

     Completed    2011

 

                                        Appointment: Akanksha Driver

WPtel:[phone]

                                        [address] [place] [address]                                              ACUTE ILLNESS   2011

 

             Patient Education: Patient Medication Summary                      

     Completed    2011

 

                                        Appointment: Akanksha Drivertel:[phone]

                                        [address] [place] [address]                                              LAB             2011

 

             Patient Education: Patient Medication Summary                      

     Completed    2011

 

                          Visit Plan:               Saline nasal flushes prn. Ty

lenol/Motrin prn headache. Notify if 

persists/symptoms worsening. Add Veramyst Increase Omeprazole to 20mg po BID

                                                            2011

 

                                        Appointment: Akanksha Driver

WPtel:[phone]

                                        [address] [place] [address]                                              ACUTE ILLNESS   2011

 

             Patient Education: Patient Medication Summary                      

     Completed    2011

 

                                        Appointment: Akanksha Driver

WPtel:[phone]

                                        [address] [place] [address]                                              FOLLOW UP       2011

 

             Patient Education: Patient Medication Summary                      

     Completed    2011

 

                                        Appointment: Akanksha Driver

WPtel:[phone]

                                        [address] [place], [place] [address]                                              ACUTE ILLNESS   2011

 

             Patient Education: Patient Medication Summary                      

     Completed    2011

 

                          Visit Plan:               Nasocourt sample given. Pt. 

will notify if symptoms are worse on 

Monday.

                                                            2010

 

                                        Appointment: Kate Hall

WPtel:[phone]

                                        [address] [place] [address]                                              ACUTE ILLNESS   2010

 

             Patient Education: Patient Medication Summary                      

     Completed    2010

 

                                        Appointment: Akanksha Driver

WPtel:[phone]

                                        [address] [place] [address]

[place]                                              INJECTION       10/06/2010

 

             Patient Education: Patient Medication Summary                      

     Completed    10/06/2010

 

                                        Appointment: Akanksha Driver

WPtel:[phone]

                                        [address] [place] [address]                                              FOLLOW UP       2010

 

             Patient Education: Patient Medication Summary                      

     Completed    2010

 

             Patient Education: Lexapro                           Completed    0

2010

 

                          Visit Plan:               May proceed with hiatal mykel

ia repair per Card. so will contact Dr. Cash's office to proceed with surgery Trial of Lexapro 5mg QD plus use 
xanax prn

                                                            2010

 

                                        Appointment: Akanksha Driver

WPtel:[phone]

                                        [address] [place], [place] [id number]

                                              FOLLOW UP       2010

 

             Patient Education: Patient Medication Summary                      

     Completed    2010

 

                          Visit Plan:               B12 given Cont oral B12 and 

iron Fwup 1mo for B12 Proceed with 

hiatal hernia repair once card clearance

                                                            2010

 

                                        Appointment: Akanksha Driver

WPtel:[phone]

                                        [address] [place] [address]

[place]                                              FOLLOW UP       2010

 

             Patient Education: Patient Medication Summary                      

     Completed    2010

 

                                        Appointment: Akanksha Driver

WPtel:[phone]

                                        [address] [place], [place] [address]

                                              FOLLOW UP       2010

 

             Patient Education: Patient Medication Summary                      

     Completed    2010

 

                                        Appointment: Akanksha Driver

WPtel:[phone] [address] [facility]

[place]                                              LAB             2010

 

             Patient Education: Patient Medication Summary                      

     Completed    2010

 

                          Visit Plan:               Check fasting lab in AM--CMP

,Lipids, CBC, Vit D, TSH,FreeT4, B12

                                                            2010

 

                                        Appointment: Akanksha Driver

WPtel:[phone] [address] [facility]

                                              FOLLOW UP       2010

 

             Patient Education: Patient Medication Summary                      

     Completed    2010

 

                                        Referral: Landon De La Torre

WPtel:[phone]

#[address] [facility]

              Referral                        Appointment Requested  

 

                                        Referral: Landon De La Torre

WPtel:[phone]

[address]              Referral                        Initiated        







Instructions





                                        Comment

 

                                        . Supportive care.  Rest, Fluids, Tyleno

l/Motrin prn fever or bodyaches.  Notify

if worsening symptoms.



 

                                        . Add miralax daily

Use daily probiotic and metamucil and push fluids

Notify if worsens/persists

 

                                        . No NSAIDs

Kidney function discussed

Discussed hydration 

Monitor lab every 4months so will check CMP, HbA1C next month

 

                                        . Vestibular exercises

Refill flonase

Strict low Na diet--discussed that needs to read labels

Rx for walker with chair given due to muscle weakness/unsteadiness

Has carotids and abdominal aorta and legs checked in January

Check Chem 7



 

                                        . Check CMP, CBC, TSH, Free T4, B12, Lip

ids, HbA1C

Discussed 6 small meals a day each with protein

Would likely benefit from antidepressant 

Update colonoscopy

 

                                        . Cerumen flush with warm water and tyler

xide - good results 

Resume Nasonex nasal spray daily

Recommended Debrox earwax removal drops 

 

                                        . Continue aspirin daily

Add meloxicam for 1week

Elevate and Ice

Call on Monday

 

                                        . Been using baclofen at bedtime and has

 helped some

PT for next 2-4weeks



 

                                        . Continue exercise and current meds

See surgery for removal of right arm lesion

 

                                        . Discussed that likely lumbar etiology 

for leg weakness

Will change amlodopine to low dose dyazide and see if helps legs and inner ear

 

                                        . Ceftin and continue claritin/flonase

Decrease amlodopine to 2.5mg q HS until fwup with Card due to weakness

 

                                        .  Supportive care.  Rest, Fluids, Tylen

ol prn fever or bodyaches.  Notify if 

worsening symptoms.

Ceftin

 

                                        . Restart flonase BID

Use meclizine 25mg q HS

Vestibular exercises

Claritin 10mg q AM

See ENT if doesn't resolve

 

                                        . Will continue to observe



 

                                        . ERx for Augmentin 875mg q12 x 10 days 

and Floxin otic drops 5 gtts AD x7days

Sample of Nasonex per pt request

Discussed treatment (nasal saline, salt water gargles, keeping right ear clean 
and dry, for worsening go to UC on weekend, Tylenol/ibuprofen, etc.)

RTC 2 weeks for ear recheck.

 

                                        . Decrease caffeine intake

Check Bilateral Mammogram with US of left breast

 

                                        Left ear flushed with warm water and per

oxide with ear syringe and then last 

removed with currette--peroxide drops instilled.  Tolerated well, no 
complications, TM intact.. Continue off carafate and see how does

Continue probiotic

Add Vestibular exercises and use meclizine prn

Continue Nasonex

Check fasting lab including CMP, Lipids, CBC, TSH, Free T4, HbA1C

 

                                        . Continue carafate for 4more weeks at c

urrent dose then decrease to BID for 

2wks then q HS

 

                                        . Continue omeprazole

Add Carafate 1gm po q AC

Add daily probiotic



 

                                        . Diabetic Diet

Accuchecks BID alternating times

Hold onglyza and Januvia for now and continue diet and exercise and weight loss 
and moniter BS

Discussed hypoglycemia and snack of peanut butter and crackers with juice or 
milk

 

                                        Informed Consent obtainded.  Right SI yasir

int cleansed with alcohol and betadine 

and injected with 3cc 1%l lidocaine with 40mg depomedrol and 40mg kenalog, 
tolerated well with no complications, neosporin and bandage applied. Injection 
to Right SI joint as above

Pt will call in 3 days on pain

Has PT starting in 2wks.

 

                                        . OMT done

Start PT

May need updated MRI if need to consider epidural

Prednisone

 

                                        . Flexeril and Vimovo

OMT done

Daily stretches

 

                                        Right SI joint cleansed with alchohol an

d betadine and injected with 3cc 1% 

lidocaine with 40mg depomedrol and 40mg kenalog, tolerated well with no 
complications, neosporin and bandage applied. OMT done

Daily stretches

Moist heat or biofreeze

Right SI joint injection

Call in 1week

Vimovo BID

 

                                        . Decrease amlodopine to 1.25mg daily

Schedule with Cardiology

Fasting lab in AM

 

                                        .  Saline nasal flushes prn. Tylenol/Mot

rin prn headache.  Notify if 

persists/symptoms worsening.

Cerumen removal from ears as above



 

                                        . Continue vimovo for 1more week

Increase Omeprazole to BID for 1mo then resume QD if stomach improved

Vestibular exercises with meclizine q HS for next week

 

                                        . Saline nasal flushes prn. Tylenol/Motr

in prn headache.  Notify if 

persists/symptoms worsening.

Add Veramyst

Increase Omeprazole to 20mg po BID

 

                                        . Nasocourt sample given.  Pt. will noti

fy if symptoms are worse on Monday. 

 

                                        . May proceed with hiatal hernia repair 

per Card. so will contact Dr. Cash's office to proceed with surgery



Trial of Lexapro 5mg QD plus use xanax prn

 

                                        . B12 given

Cont oral B12 and iron

Fwup 1mo for B12

Proceed with hiatal hernia repair once card clearance

 

                                        . Check fasting lab in AM--CMP,Lipids, C

BC, Vit D, TSH,FreeT4, B12







Medical Equipment

No Medical Equipment data



Health Concerns Section

Health Concerns data not found



Goals Section

Goals data not found



Interventions Section

Interventions data not found



Health Status Evaluations/Outcomes Section

Health Status Evaluations/Outcomes data not found



Advance Directives

No Advance Directive data

## 2020-02-19 NOTE — XMS REPORT
CCD document using C-CDA

                             Created on: 2020



Leilani Ramírez

External Reference #: [id number]

: 1940

Sex: Female



Demographics





                          Address                   [address] [place]  [address]

 

                          Home Phone                [phone]

 

                          Preferred Language        Unknown

 

                          Marital Status            

 

                          Jehovah's witness Affiliation     Unknown

 

                          Race                      White

 

                          Ethnic Group              Not  or 





Author





                          Author                    Leilani Driver D.O.

 

                          Organization              [facility]

 

                          Address                   [address] [place]  [address]



 

                          Phone                     [phone]







Care Team Providers





                    Care Team Member Name Role                Phone

 

                    Akanksha Driver D.O. PP                  Unavailable

 

                          CCM                       Unavailable







Summary Purpose

Interface Exchange



Insurance Providers





             Payer name   Policy type / Coverage type Covered party ID Effective

 Begin Date 

Effective End Date

 

             [employer] [place] B [id number]  2018   

  Unknown

 

             [employer] Senior Supplemental Medicare Part B [id number]   [id number]    

 Unknown







Family history





Father



                          Diagnosis                 Age At Onset

 

                          Heart disease             Unknown







Mother



                          Diagnosis                 Age At Onset

 

                          Heart disease             Unknown

 

                          Cancer                    Unknown







Social History





                Social History Element Codes           Description     Effective

 Dates

 

                Tobacco history SNOMED CT: 904638484 Never smoker    2011

 

                Marital status  Unknown         Single          2010

 

                Number of children Unknown         9               2010







Allergies, Adverse Reactions, Alerts





           Substance  Reaction   Codes      Entered Date Inactivated Date Status

 

           _                     Unknown    2019 No Inactive Date Active

 

           * NO KNOWN FOOD ALLERGIES            Unknown    2010 No Inactiv

e Date Active

 

           _                     Unknown    2010 No Inactive Date Active

 

           QUINIDINE-QUININE ANALOGUES hives,     Unknown    2010 No Inact

diamante Date Active







Problems





                Condition       Codes           Effective Dates Condition Status

 

                          Essential (primary) hypertension ICD-9: 401.9

ICD-10: I10               2014                Active

 

                          Palpitations              ICD-9: 785.1

ICD-10: R00.2             10/02/2018                Active

 

                          Stress reaction           ICD-9: 308.9

ICD-10: F43.0             2020                Active

 

                          Mixed hyperlipidemia      ICD-9: 272.4

ICD-10: E78.2             2019                Active

 

                          FLU VACCINE               ICD-9: V04.81

ICD-10: Z23               2012                Active

 

                          Acute serous otitis media, left ear ICD-9: 381.01

ICD-10: H65.02            2019                Active

 

                          Coronary atherosclerosis due to calcified coronary les

ion ICD-9: 414.00

ICD-10: I25.84            2018                Active

 

                          Hypoglycemia, unspecified ICD-9: 251.2

ICD-10: E16.2             2017                Active

 

                          Other fatigue             ICD-9: 780.79

ICD-10: R53.83            2017                Active

 

                          Urinary tract infection, site not specified ICD-9: 599

.0

ICD-10: N39.0             10/02/2018                Active

 

                          Gastro-esophageal reflux disease without esophagitis I

CD-9: 530.81

ICD-10: K21.9             2018                Active

 

                          Epigastric pain           ICD-9: 789.06

ICD-10: R10.13            2018                Active

 

                          Atherosclerotic heart disease of native coronary arter

y without angina pectoris 

ICD-9: 414.00

ICD-10: I25.10            2018                Active

 

                          Generalized anxiety disorder ICD-9: 300.00

ICD-10: F41.1             2018                Active

 

                          Dizziness and giddiness   ICD-9: 780.4

ICD-10: R42               2014                Active

 

                          Occlusion and stenosis of bilateral carotid arteries I

CD-9: 433.10

ICD-10: I65.23            2018                Active

 

                          Cervicalgia               ICD-9: 723.1

ICD-10: M54.2             2018                Active

 

                          Other spondylosis with radiculopathy, cervical region 

ICD-9: 721.0

ICD-10: M47.22            2018                Active

 

                          Cervicocranial syndrome   ICD-9: 723.2

ICD-10: M53.0             2018                Active

 

                          Vertigo of central origin, bilateral ICD-9: 386.2

ICD-10: H81.43            2018                Active

 

                          Benign paroxysmal vertigo, bilateral ICD-9: 386.11

ICD-10: H81.13            2018                Active

 

                          Otalgia, bilateral        ICD-9: 388.70

ICD-10: H92.03            2018                Active

 

                          Left lower quadrant pain  ICD-9: 789.04

ICD-10: R10.32            2017                Active

 

                          Low back pain             ICD-9: 724.2

ICD-10: M54.5             2017                Active

 

                          Radiculopathy, lumbosacral region ICD-9: 724.4

ICD-10: M54.17            2018                Active

 

                          Impaired fasting glucose  ICD-9: 790.29

ICD-10: R73.01            2012                Active

 

                          Other intervertebral disc degeneration, lumbar region 

ICD-9: 722.52

ICD-10: M51.36            2017                Active

 

                          Pain in thoracic spine    ICD-9: 724.1

ICD-10: M54.6             2017                Active

 

                          Mastodynia                ICD-9: 611.71

ICD-10: N64.4             2017                Active

 

                          Acute upper respiratory infection, unspecified ICD-9: 

465.9

ICD-10: J06.9             2017                Active

 

                          Acute mastoiditis without complications, right ear ICD

-9: 383.00

ICD-10: H70.001           2016                Active

 

                          Constipation, unspecified ICD-9: 564.00

ICD-10: K59.00            2016                Active

 

                          Chronic kidney disease, stage 1 ICD-9: 585.1

ICD-10: N18.1             03/15/2016                Active

 

                          History of falling        ICD-9: V15.88

ICD-10: Z91.81            12/15/2015                Active

 

                          Muscle weakness (generalized) ICD-9: 780.79

ICD-10: M62.81            2015                Active

 

                Acute renal failure ICD-9: 584.9    2015      Active

 

                POLYURIA        ICD-9: 788.42   2015      Active

 

                CAD             ICD-9: 414.00   09/10/2015      Active

 

                Hyperglycemia   ICD-9: 790.29   2012      Active

 

                HYPERLIPIDEMIA NEC/NOS ICD-9: 272.4    09/10/2015      Active

 

                ABDOMINAL PAIN  ICD-9: 789.00   10/10/2012      Active

 

                Grieving        ICD-9: 309.0    2015      Active

 

                HYPOGLYCEMIA    ICD-9: 251.2    2012      Active

 

                MALAISE AND FATIGUE ICD-9: 780.79   2015      Active

 

                CERUMEN IMPACTION ICD-9: 380.4    2015      Active

 

                Leg pain        ICD-9: 729.5    2015      Active

 

                SUPERFIC PHLEBITIS-LEG ICD-9: 451.0    2015      Active

 

                SPASM OF MUSCLE ICD-9: 728.85   2015      Active

 

                Thoracic back pain ICD-9: 724.1    2015      Active

 

                Benign positional vertigo ICD-9: 386.11   2015      Active

 

                Suspicious nevus ICD-9: 238.2    2015      Active

 

                EUSTACHIAN TUBE DYSFUNCTION ICD-9: 381.81   2014      Acti

ve

 

                SINUSITIS, ACUTE ICD-9: 461.9    2014      Active

 

                DIZZINESS/VERTIGO ICD-9: 780.4    2014      Active

 

                HYPERTENSION    ICD-9: 401.9    2014      Active

 

                URI, ACUTE      ICD-9: 465.9    10/15/2013      Active

 

                CEPHALGIA       ICD-9: 784.0    2013      Active

 

                OTITIS MEDIA NOS ICD-9: 382.9    2013      Active

 

                Perforation of ear drum ICD-9: 384.20   05/10/2013      Active

 

                Mastalgia       ICD-9: 611.71   2013      Active

 

                DYSPEPSIA       ICD-9: 536.8    10/10/2012      Active

 

                IBS             ICD-9: 564.1    10/10/2012      Active

 

                FLU VACCINE     ICD-9: V04.81   2012      Active

 

                Radiculopathy of leg ICD-9: 724.4    2012      Active

 

                SCIATICA        ICD-9: 724.3    2012      Active

 

                Lumbar degenerative disc disease ICD-9: 722.52   2012     

 Active

 

                Sacroiliac dysfunction ICD-9: 739.4    2012      Active

 

                Hypertension    Unknown         2012      Active

 

                PAIN, LOWER BACK ICD-9: 724.2    2011      Active

 

                SPINAL ENTHESOPATHY ICD-9: 720.1    2011      Active

 

                Hypotension     ICD-9: 458.9    2011      Active

 

                SACROILIITIS NEC ICD-9: 720.2    2011      Active

 

                PHARYNGITIS, ACUTE ICD-9: 462      2010      Active

 

                URINARY TRACT INFECTION ICD-9: 599.0    2010      Active

 

                Heat exhaustion ICD-9: 992.5    2010      Active

 

                Esophagitis     ICD-9: 530.10   2010      Active

 

                Gastritis       ICD-9: 535.50   2010      Active

 

                GERD            ICD-9: 530.81   2010      Active

 

                Hiatal hernia   ICD-9: 553.3    2010      Active

 

                B12 DEFIC ANEMIA NEC ICD-9: 281.1    2010      Active

 

                Anxiety         Unknown         2010      Active

 

                Heart disease   Unknown         2010      Active

 

                Hyperlipidemia  Unknown         2010      Active

 

                ANXIETY STATE NOS ICD-9: 300.00   2010      Active

 

                DEPRESSIVE DISORDER NEC ICD-9: 311      2010      Active







Medications





          Medication Codes     Instructions Start Date Stop Date Status    Fill 

Instructions

 

                    metoprolol tartrate 25 mg tablet RxNorm: 584373      1 Table

t(s) Oral QAM and two 

tablets (50mg) in the evening 2020      Active           

 

                    Flonase Allergy Relief 50 mcg/actuation nasal spray,suspensi

on RxNorm: 4528454     2

Spray Nasal every night at bedtime 2020      Inactive     

    

 

           simvastatin 40 mg tablet RxNorm: 017806 1 Tablet(s) PO QD 2019 

02/15/2020 

Active                                   

 

                omeprazole 40 mg capsule,delayed release RxNorm: 702883  1 Capsu

le(s) Oral QD 

2019          Active               

 

                Xanax 0.25 mg tablet RxNorm: 731077  TAKE 1 TABLET BY MOUTH TWIC

E DAILY 

10/29/2019          No Stop Date        Active               

 

                omeprazole 40 mg capsule,delayed release RxNorm: 270759  1 Capsu

le(s) PO QD 

10/07/2019          2019          Inactive             

 

             metoprolol tartrate 25 mg tablet RxNorm: 707602 1 Tablet(s) PO BID 

2019                Inactive                   

 

                omeprazole 40 mg capsule,delayed release RxNorm: 652817  1 Capsu

le(s) PO QD 

2019          10/06/2019          Inactive             

 

                    Flonase Allergy Relief 50 mcg/actuation nasal spray,suspensi

on RxNorm: 6354228     2

[facility] QHS 2019      Inactive         

 

           simvastatin 40 mg tablet RxNorm: 545298 1 Tablet(s) PO QD 2019 

Inactive                                 

 

           simvastatin 40 mg tablet RxNorm: 167138 1 Tablet(s) PO QD 2019 

Inactive                                 

 

                omeprazole 40 mg capsule,delayed release RxNorm: 862932  1 Capsu

le(s) PO QD 

2019          Inactive             

 

           simvastatin 40 mg tablet RxNorm: 242569 1 Tablet(s) PO QD 2019 

Inactive                                 

 

                omeprazole 40 mg capsule,delayed release RxNorm: 623482  1 Capsu

le(s) PO QD 

2019          Inactive             

 

             Bactrim  mg-160 mg tablet RxNorm: 836058 1 Tablet(s) PO BID 0

3/08/2019   

2019                Inactive                   

 

             Bactrim  mg-160 mg tablet RxNorm: 302702 1 Tablet(s) PO BID 0

3/08/2019   

2019                Inactive                   

 

             Macrobid 100 mg capsule RxNorm: 408113 1 Capsule(s) PO BID 20

                          Inactive                   

 

             metoprolol tartrate 25 mg tablet RxNorm: 590781 1 Tablet(s) PO BID 

2019                Inactive                   

 

                Xanax 0.25 mg tablet RxNorm: 685672  TAKE 1 TABLET BY MOUTH TWIC

E DAILY 

2018          10/28/2019          Inactive             

 

           Xanax 0.25 mg tablet RxNorm: 680029 1 Tablet(s) PO BID 2018 

Inactive                                 

 

                    Protonix 40 mg tablet,delayed release RxNorm: 189411      1 

Tablet(s) PO BID for 

stomach--replaces omeprazole 2018      Inactive         

 

             Carafate 1 gram tablet RxNorm: 855565 1 Tablet(s) PO AC & HS 2019                Inactive                   

 

           Xanax 0.25 mg tablet RxNorm: 956330 1 Tablet(s) PO BID 10/30/2018 12/

10/2018 

Inactive                                 

 

             Bactrim  mg-160 mg tablet RxNorm: 211182 1 Tablet(s) PO BID 1

   

10/08/2018                Inactive                   

 

             Bactrim  mg-160 mg tablet RxNorm: 772436 1 Tablet(s) PO BID 1

   

10/03/2018                Inactive                   

 

             Carafate 1 gram tablet RxNorm: 753600 1 Tablet(s) PO AC & HS 09/18/

2018   

10/17/2018                Inactive                   

 

                    Protonix 40 mg tablet,delayed release RxNorm: 737482      1 

Tablet(s) PO BID for 

stomach--replaces omeprazole 2018      Inactive         

 

                    Protonix 40 mg tablet,delayed release RxNorm: 975909      1 

Tablet(s) PO BID for 

stomach--replaces omeprazole 2018      Inactive         

 

                    fluticasone 50 mcg/actuation nasal spray,suspension RxNorm: 

2802465     2 Spray 

NASAL QD to each nostril 2018      Inactive         

 

             Nasonex 50 mcg/actuation Spray RxNorm: 7147402 2 [facility] 2018                Inactive                   

 

                omeprazole 40 mg capsule,delayed release RxNorm: 317107  1 Capsu

le(s) PO QD 

2018          08/15/2018          Inactive             

 

                    simvastatin 40 mg tablet RxNorm: 895234      1 Tablet(s) PO 

QD TAKE 1 TABLET EVERY 

DAY             2018      Inactive         

 

             metoprolol tartrate 25 mg tablet RxNorm: 306601 1/2 Tablet(s) PO QD

 2018                Inactive                   

 

                simvastatin 40 mg tablet RxNorm: 707451  Tablet(s) TAKE 1 TABLET

 EVERY DAY 

2017          Inactive             

 

             metoprolol tartrate 25 mg tablet RxNorm: 355281 1/2 Tablet(s) PO QD

 2017                Inactive                   

 

                    Flonase 50 mcg/actuation nasal spray,suspension RxNorm: 1797

933     2 [facility] 

BID             2017      Inactive         

 

                omeprazole 40 mg capsule,delayed release RxNorm: 384692  1 Capsu

le(s) PO QD 

2017          Inactive             

 

             metoprolol tartrate 25 mg tablet RxNorm: 871464 1/2 Tablet(s) PO QD

 04/10/2017   

2017                Inactive                   

 

                    ciprofloxacin 0.2 % ear drops in a dropperette RxNorm: 67747

6      4 Drop(s) OTIC TID

for 1 week      2016      Inactive         

 

           cefdinir 300 mg capsule RxNorm: 595805 2 Capsule(s) PO QD 2016 

Inactive                                 

 

                    omeprazole 40 mg capsule,delayed release RxNorm: 996438     

 TAKE 1 CAPSULE EVERY DAY

                2016      Inactive         

 

             simvastatin 40 mg tablet RxNorm: 505157 TAKE 1 TABLET EVERY DAY 2017                Inactive                   

 

                    Flonase 50 mcg/actuation nasal spray,suspension RxNorm: 1797

933     2 [facility] 

BID             2015      Inactive         

 

             cefuroxime axetil 250 mg tablet RxNorm: 426720 1 Tablet(s) PO BID 0

2015                Inactive                   

 

             cefuroxime axetil 250 mg tablet RxNorm: 460999 1 Tablet(s) PO BID 0

2015                Inactive                   

 

                omeprazole 40 mg capsule,delayed release RxNorm: 325974  1 Capsu

le(s) PO QD 

2015          Inactive             

 

           meloxicam 7.5 mg tablet RxNorm: 131849 1 Tablet(s) PO QD 2015 0

2015 

Inactive                                 

 

                loratadine 10 mg tablet RxNorm: 808847  1 Tablet(s) PO QAM for a

llergies 

2014          Inactive             

 

                    Flonase 50 mcg/actuation nasal spray,suspension RxNorm: 8963

23      2 [facility] BID

                2014      12/15/2015      Inactive         

 

           prednisone 20 mg tablet RxNorm: 592268 2 Tablet(s) PO BID 2014 

Inactive                                 

 

             Dyazide 37.5 mg-25 mg capsule RxNorm: 557277 1 Capsule(s) PO QAM 2014                Inactive                   

 

           Ceftin 500 mg tablet RxNorm: 674684 1 Tablet(s) PO BID 2014 

Inactive                                 

 

           Ceftin 500 mg tablet RxNorm: 781151 1 Tablet(s) PO BID 2014 

Inactive                                 

 

                    simvastatin 40 mg tablet RxNorm: 910201      Tablet(s) PO TA

KE ONE TABLET BY MOUTH 

EVERY DAY       2014      Inactive         

 

                    metoprolol tartrate 25 mg tablet RxNorm: 918674      Tablet(

s) PO TAKE ONE-HALF 

TABLET BY MOUTH EVERY DAY 2014      Inactive         

 

           Ceftin 500 mg tablet RxNorm: 853955 1 Tablet(s) PO BID 10/15/2013 10/

 

Inactive                                 

 

                loratadine 10 mg tablet RxNorm: 545134  1 Tablet(s) PO QAM for a

llergies 

2013          Inactive             

 

                    omeprazole 20 mg capsule,delayed release RxNorm: 966518     

 Capsule(s) PO TAKE ONE 

CAPSULE BY MOUTH TWICE DAILY 2013      Inactive         

 

                omeprazole 20 mg capsule,delayed release RxNorm: 535309  1 Capsu

le(s) PO BID 

2013          Inactive             

 

             Augmentin 875 mg-125 mg tablet RxNorm: 115464 1 Tablet(s) PO Q12H 0

5/10/2013   

2013                Inactive                   

 

             Floxin Otic Drops 1 bottle Drops RxNorm:      5 Drop(s) OTIC BID 05

/10/2013   

2013                Inactive                   

 

                    metoprolol tartrate 25 mg tablet RxNorm: 640765      Tablet(

s) PO TAKE ONE-HALF 

TABLET BY MOUTH EVERY DAY 2013      Inactive         

 

                    simvastatin 40 mg tablet RxNorm: 486863      Tablet(s) PO TA

KE ONE TABLET BY MOUTH 

EVERY DAY       2013      Inactive         

 

                lancets         RxNorm:         Misc Miscellaneous USE ONE TO CH

YOGI GLUCOSE EVERY DAY 

2013          Inactive             

 

             Carafate 1 gram tablet RxNorm: 383510 1 Tablet(s) PO AC & HS 2015                Inactive                   

 

           Flagyl 500 mg tablet RxNorm: 675118 1 Tablet(s) PO TID 10/10/2012 10/

 

Inactive                                 

 

             Carafate 1 gram tablet RxNorm: 668162 1 Tablet(s) PO AC & HS 10/10/

2012   

2012                Inactive                   

 

          One Touch Test strips RxNorm:   Miscellaneous QD 2012

 Inactive  

one touch ultra mini test strips

 

           Protonix 40 mg Tab RxNorm: 959083 1 Tablet(s) PO QD 2012 

Inactive                                 

 

           Protonix 40 mg Tab RxNorm: 703096 1 Tablet(s) PO QD 2012 10/09/

2012 

Inactive                                 

 

           prednisone 20 mg Tab RxNorm: 650870 1 Tablet(s) PO BID 2012 

Inactive                                 

 

             Flexeril 5 mg Tab RxNorm: 004640 1 Tablet(s) PO QHS for spasm 2012                Inactive                   

 

             Flexeril 5 mg Tab RxNorm: 042988 1 Tablet(s) PO QHS for spasm 2012                Inactive                   

 

                amlodipine 2.5 mg Tab RxNorm: 893509  1/2 Tablet(s) PO QD replac

es 5mg dose 

2012          Inactive             

 

           simvastatin 40 mg tablet RxNorm: 041012 1 Tablet(s) PO QD 2012 

Inactive                                 

 

             metoprolol tartrate 25 mg tablet RxNorm: 864637 1/2 Tablet(s) PO QD

 2012                Inactive                   

 

                omeprazole 20 mg capsule,delayed release RxNorm: 320867  1 Capsu

le(s) PO BID 

2012          Inactive             

 

           cefdinir 300 mg Cap RxNorm: 144891 2 Capsule(s) PO QD 2011 

Inactive                                 

 

           simvastatin 40 mg Tab RxNorm: 691320 1 Tablet(s) PO QD 2011 

Inactive                                 

 

             metoprolol tartrate 25 mg Tab RxNorm: 809657 1/2 Tablet(s) PO QD 10

/   

2012                Inactive                   

 

             metoprolol tartrate 25 mg Tab RxNorm: 311214 1/2 Tablet(s) PO QD 10

/   

10/24/2011                Inactive                   

 

           meclizine 25 mg Tab RxNorm: 6875804 1 Tablet(s) PO QHS 2011 

Inactive                                for dizziness

 

           Ceftin 500 mg Tab RxNorm: 314896 1 Tablet(s) PO BID 2011 

Inactive                                 

 

                omeprazole 20 mg Cap, Delayed Release RxNorm: 396594  1 Capsule(

s) PO BID 

2011          10/24/2011          Inactive             

 

           simvastatin 40 mg Tab RxNorm: 592142 1 Tablet(s) PO QD 2011 

Inactive                                 

 

           simvastatin 40 mg Tab RxNorm: 968568 1 Tablet(s) PO QD 2011 

Inactive                                 

 

           simvastatin 40 mg Tab RxNorm: 698485 1 Tablet(s) PO QD 2010 

Inactive                                 

 

             Tessalon Perles 100 mg Cap RxNorm: 876375 1 Capsule(s) PO Q6-8H 2010                Inactive                   

 

           cefdinir 300 mg Cap RxNorm: 405956 1 Capsule(s) PO BID 2010 

Inactive                                 

 

           Lexapro 10 mg Tab RxNorm: 680940 1 Tablet(s) PO QD 2010

010 

Inactive                                 

 

           Cipro 250 mg Tab RxNorm: 946646 1 Tablet(s) PO BID 2010 10/04/2

010 

Inactive                                 

 

             Wellbutrin  mg 24 hr Tab RxNorm: 606231 1 Tablet(s) PO QAM 2010                Inactive                   

 

          Fish Oil 1,000 mg Cap RxNorm:   1 Capsule(s) PO QD No Start Date      

     Active     

 

                Tylenol Extra Strength 500 mg tablet RxNorm: 709067  1/2 Tablet(

s) PO as needed 

No Start Date                           Active               

 

                nitroglycerin 0.4 mg sublingual tablet RxNorm: 803969  Tablet(s)

 SL as needed No 

Start Date                              Active               

 

                    Calcium with Vitamin D 600 mg (1,500 mg)-400 unit tablet RxN

orm: 898663      1 

Tablet(s) PO QD No Start Date                   Active           

 

           Multivitamin & Mineral Formula Tab RxNorm:    1 Tablet(s) PO QD No St

art Date            

Active                                   

 

          vitamin B complex capsule RxNorm:   1 Capsule(s) PO QD No Start Date  

         Active     

 

          Aspirin 81 mg Tab RxNorm: 343111 1 Tablet(s) PO QD No Start Date      

     Active     

 

                    isosorbide mononitrate ER 30 mg tablet,extended release 24 h

r RxNorm: 323534      1 

Tablet(s) PO QHS No Start Date                   Active           

 

           Vitamin D 1,000 unit Cap RxNorm: 151890 1 Capsule(s) PO QD No Start D

ate            

Active                                   

 

          Co Q-10 oral RxNorm: 45608 oral      No Start Date           Active   

  

 

             metoprolol tartrate 25 mg Tab RxNorm: 981063 1/2 Tablet(s) PO QD No

 Start Date 

10/23/2011                Inactive                   

 

             Nasonex 50 mcg/actuation Spray RxNorm: 6154405 2 Spray NASAL No Sta

rt Date 

2018                Inactive                   

 

           Vitamin B12 1000mcg Tablet RxNorm:    1 Tablet(s) PO QD No Start Date

 2015 

Inactive                                 

 

           amlodipine 5 mg Tab RxNorm: 792960 1 Tablet(s) PO QD No Start Date  

Inactive                                 

 

             simvastatin 40 mg tablet RxNorm: 345088 1 Tablet(s) PO QD No Start 

Date 

2019                Inactive                   

 

                omeprazole 20 mg capsule,delayed release RxNorm: 039839  1 Capsu

le(s) PO BID No 

Start Date          2013          Inactive             

 

                omeprazole 40 mg capsule,delayed release RxNorm: 971488  1 Capsu

le(s) PO QD No 

Start Date          2019          Inactive             

 

           Nexium 40 mg Cap RxNorm: 490555 1 Capsule(s) PO QD No Start Date  

Inactive                                 

 

             Stool Softener 100 mg Tab RxNorm: 3818475 2 Tablet(s) PO BID No Sta

rt Date 

2012                Inactive                   

 

                    Calcium with Vitamin D 600 mg (1,500 mg)-400 unit Tab RxNorm

: 228071      1 Tablet(s)

PO QD           No Start Date   2015      Inactive         

 

             iron 325 mg (65 mg iron) Tab RxNorm: 285260 1 Tablet(s) PO QD No St

art Date 

2015                Inactive                   

 

                Flonase 50 mcg/actuation Nasal Spray RxNorm: 991239  2 [facility] ROZ

AL BID No Start 

Date                2014          Inactive             

 

                    fluticasone 50 mcg/actuation nasal spray,suspension RxNorm: 

8592447     2 Spray 

NASAL QD to each nostril No Start Date   2018      Inactive         

 

             Nasonex 50 mcg/actuation Spray RxNorm: 7818023 2 Spray NASAL QD No 

Start Date 

2018                Inactive                   

 

                    hydralazine 50 mg tablet RxNorm: 772441      1 Tablet(s) PO 

as needed for BP over 

160/90          No Start Date   2018      Inactive         

 

             Vimovo 500 mg-20 mg 12 hr Tab RxNorm: 392629 1 Tablet(s) PO BID No 

Start Date 

2011                Inactive                   

 

             Tylenol PM 25 mg-500 mg/15 mL Oral Soln RxNorm: 2334109 1 PO QPM   

  No Start Date 

2015                Inactive                   

 

          Iron (Ferrous Sulfate) Oral RxNorm:   Oral      No Start Date 20

12 Inactive   

 

             Reglan 10 mg Tab RxNorm: 817271 1 Tablet(s) PO TID before meals No 

Start Date 

2011                Inactive                   

 

           Xanax 1 mg Tab RxNorm: 970612 1/2 Tablet(s) PO QD No Start Date  

Inactive                                 

 

             amlodipine 10 mg tablet RxNorm: 725430 1 Tablet(s) PO QD No Start D

ate 

2014                Inactive                   

 

          Iron (dried) Oral RxNorm:   Oral      No Start Date 2012 Inactiv

e   

 

                metoprolol tartrate 50 mg tablet RxNorm: 489105  1 Tablet(s) PO 

BID No Start Date

                    12/10/2018          Inactive             

 

           Xanax 0.25 mg tablet RxNorm: 248485 1 Tablet(s) PO BID No Start Date 

10/29/2018 

Inactive                                 

 

                lancets         RxNorm:         Miscellaneous check blood sugar 

at least once daily No Start 

Date                2013          Inactive             

 

           simvastatin 40 mg Tab RxNorm: 689399 1 Tablet(s) PO QD No Start Date 

2010 

Inactive                                 

 

                    isosorbide mononitrate ER 30 mg tablet,extended release 24 h

r RxNorm: 551927      1 

Tablet(s) PO QHS No Start Date   2019      Inactive         

 

             amlodipine 2.5 mg tablet RxNorm: 644457 1 Tablet(s) PO QD No Start 

Date 

2014                Inactive                   

 

             sucralfate 1 gram tablet RxNorm: 955409 1 Tablet(s) PO QID No Start

 Date 

2019                Inactive                   

 

          Fish Oil Oral RxNorm:   Oral      No Start Date 2012 Inactive   

 

          Multiple Vitamin Oral RxNorm:   Oral      No Start Date 2012 [facility]

ctive   

 

                metoprolol tartrate 25 mg tablet RxNorm: 765322  1/2 Tablet(s) P

O QD No Start 

Date                2017          Inactive             

 

                metoprolol tartrate 50 mg tablet RxNorm: 476164  1/2 Tablet(s) P

O BID No Start 

Date                2019          Inactive             

 

                    Flonase Allergy Relief 50 mcg/actuation nasal spray,suspensi

on RxNorm: 6325436     2

[facility] NASAL QHS No Start Date   2019      Inactive         







Medication Administered

No Medication Administered data



Immunizations





                Vaccine         Codes           Date            Status

 

                Influenza       CVX: 135        10/22/2019      Complete

 

                Influenza       CVX: 135        10/22/2019      Complete

 

                Influenza       CVX: 135        2018      Complete

 

                Influenza       CVX: 135        10/06/2017      Complete

 

                Influenza       CVX: 135        10/07/2016      Complete

 

                Influenza       CVX: 135        10/16/2015       

 

                Influenza       CVX: 141        2013       

 

                Influenza (Adult) CVX: 141        10/06/2010       







Results





          Observation Observation Code Item      Item Code Result    Date      S

[facility] Location

 

          COMPLETE BLOOD COUNT [id number]   WBC                 7.1 10e9/L 20

20 Unknown

 

          COMPLETE BLOOD COUNT [id number]   RBC                 4.16 10e12/L 2020 Unknown

 

          COMPLETE BLOOD COUNT [id number]   HEMOGLOBIN           12.4 g/dL 20

20 Unknown

 

          COMPLETE BLOOD COUNT [id number]   HEMATOCRIT           39.3 %    20

20 Unknown

 

          COMPLETE BLOOD COUNT [id number]   MCV                 94.5 fL   

0 Unknown

 

          COMPLETE BLOOD COUNT 5921746   MCH                 29.8 pg   

0 Unknown

 

          COMPLETE BLOOD COUNT 9655383   MCHC                31.6 g/dL 

0 Unknown

 

          COMPLETE BLOOD COUNT 7363700   PLATELET COUNT           161 10e9/L 2020 Unknown

 

          COMPLETE BLOOD COUNT [id number]   Mean Plt Volume           10.8 fL   2020 Unknown

 

          COMPLETE BLOOD COUNT 7486809   Neut Auto           38.7 %    

0 Unknown

 

          COMPLETE BLOOD COUNT [id number]   Lymph Auto           48.0 %    20

20 Unknown

 

          COMPLETE BLOOD COUNT [id number]   Mono Auto           9.5 %     

0 Unknown

 

          COMPLETE BLOOD COUNT [id number]   RDW                 13.5 %    

0 Unknown

 

          COMPLETE BLOOD COUNT [id number]   Eos Auto            3.2 %     

0 Unknown

 

          COMPLETE BLOOD COUNT [id number]   Baso Auto           0.6 %     

0 Unknown

 

          COMPLETE BLOOD COUNT [id number]   Neutrophil Abs           2.75 10e9/L  Unknown

 

          COMPLETE BLOOD COUNT [id number]   Lymphocyte Abs           3.41 10e9/L  Unknown

 

          COMPLETE BLOOD COUNT [id number]   Monocyte Abs           0.67 10e9/L 2020 Unknown

 

          COMPLETE BLOOD COUNT [id number]   Eosinophil Abs           0.23 10e9/L  Unknown

 

          COMPLETE BLOOD COUNT [id number]   RDW-SD              44.7 fL   

0 Unknown

 

          COMPLETE BLOOD COUNT [id number]   Basophil Abs           0.04 10e9/L 2020 Unknown

 

          THYROID STIMULATING HORMONE 93437     TSH                 1.677 uIU/mL

 2020 Unknown

 

          COMPREHENSIVE METABOLIC 27290     AST                 19 U/L    2020 Unknown

 

          COMPREHENSIVE METABOLIC 34928     ALT                 12 U/L    2020 Unknown

 

          COMPREHENSIVE METABOLIC 77090     BUN                 17 mg/dL  2020 Unknown

 

          COMPREHENSIVE METABOLIC 10497     ALBUMIN             4.2 g/dL  2020 Unknown

 

          COMPREHENSIVE METABOLIC 85576     CHLORIDE            103 mmol/L  Unknown

 

          COMPREHENSIVE METABOLIC 81839     Bili Total           0.2 mg/dL  Unknown

 

          COMPREHENSIVE METABOLIC 30856     ALK PHOS            61 U/L    2020 Unknown

 

          COMPREHENSIVE METABOLIC 38625     SODIUM              140 mmol/L  Unknown

 

          COMPREHENSIVE METABOLIC 35526     CREATININE           0.95 mg/dL 2020 Unknown

 

          COMPREHENSIVE METABOLIC 09908     CALCIUM             9.4 mg/dL 2020 Unknown

 

          COMPREHENSIVE METABOLIC 72295     POTASSIUM           4.2 mmol/L  Unknown

 

          COMPREHENSIVE METABOLIC 07862     Total Protein           6.5 g/dL   Unknown

 

          COMPREHENSIVE METABOLIC 17816     Glucose             103 mg/dL 2020 Unknown

 

          COMPREHENSIVE METABOLIC 11719     Bicarbonate           26 mmol/L 2020 Unknown

 

          COMPREHENSIVE METABOLIC 02958     AGAP                11 mmol/L 2020 Unknown

 

          GFR CALC  4129103   GFR Non Afr Amr           57 mL/min 2020 Unk

nown

 

          GFR CALC  2120153   GFR Afr Amr           >60 mL/min 2020 Unknow

n

 

          GFR CALC  0094228   GFR Non Afr Amr           52 mL/min 10/11/2019 Unk

nown

 

          GFR CALC  4709111   GFR Afr Amr           >60 mL/min 10/11/2019 Unknow

n

 

          COMPREHENSIVE METABOLIC 01317     AST                 17 U/L    10/11/

2019 Unknown

 

          COMPREHENSIVE METABOLIC 22370     ALT                 11 U/L    10/11/

2019 Unknown

 

          COMPREHENSIVE METABOLIC 59168     BUN                 17 mg/dL  10/11/

2019 Unknown

 

          COMPREHENSIVE METABOLIC 37482     ALBUMIN             3.9 g/dL  10/11/

2019 Unknown

 

          COMPREHENSIVE METABOLIC 72907     CHLORIDE            108 mmol/L 10/11

/2019 Unknown

 

          COMPREHENSIVE METABOLIC 63130     Bili Total           0.5 mg/dL 10/11

/2019 Unknown

 

          COMPREHENSIVE METABOLIC 52276     ALK PHOS            72 U/L    10/11/

2019 Unknown

 

          COMPREHENSIVE METABOLIC 83465     SODIUM              142 mmol/L 10/11

/2019 Unknown

 

          COMPREHENSIVE METABOLIC 70614     CREATININE           1.02 mg/dL 10/1

2019 Unknown

 

          COMPREHENSIVE METABOLIC 83894     CALCIUM             9.3 mg/dL 10/11/

2019 Unknown

 

          COMPREHENSIVE METABOLIC 67265     POTASSIUM           4.0 mmol/L 10/11

/2019 Unknown

 

          COMPREHENSIVE METABOLIC 10943     Total Protein           6.2 g/dL  10

/2019 Unknown

 

          COMPREHENSIVE METABOLIC 80678     Glucose             93 mg/dL  10/11/

2019 Unknown

 

          COMPREHENSIVE METABOLIC 58150     Bicarbonate           27 mmol/L 10/1

2019 Unknown

 

          COMPREHENSIVE METABOLIC 24467     AGAP                7 mmol/L  10/11/

2019 Unknown

 

          GFR CALC  1658553   GFR Non Afr Amr           48 mL/min 06/10/2019 Unk

nown

 

          GFR CALC  7072176   GFR Afr Amr           59 mL/min 06/10/2019 Unknown

 

          THYROID STIMULATING HORMONE 48200     TSH                 1.936 uIU/mL

 06/10/2019 Unknown

 

          COMPREHENSIVE METABOLIC 45109     AST                 18 U/L    06/10/

2019 Unknown

 

          COMPREHENSIVE METABOLIC 88143     ALT                 11 U/L    06/10/

2019 Unknown

 

          COMPREHENSIVE METABOLIC 22425     BUN                 18 mg/dL  06/10/

2019 Unknown

 

          COMPREHENSIVE METABOLIC 00821     ALBUMIN             4.2 g/dL  06/10/

2019 Unknown

 

          COMPREHENSIVE METABOLIC 50407     CHLORIDE            109 mmol/L 06/10

/2019 Unknown

 

          COMPREHENSIVE METABOLIC 24290     Bili Total           0.4 mg/dL 06/10

/2019 Unknown

 

          COMPREHENSIVE METABOLIC 20435     ALK PHOS            64 U/L    06/10/

2019 Unknown

 

          COMPREHENSIVE METABOLIC 57505     SODIUM              142 mmol/L 06/10

/2019 Unknown

 

          COMPREHENSIVE METABOLIC 74478     CREATININE           1.09 mg/dL  Unknown

 

          COMPREHENSIVE METABOLIC 31611     CALCIUM             9.3 mg/dL 06/10/

2019 Unknown

 

          COMPREHENSIVE METABOLIC 15729     POTASSIUM           4.7 mmol/L 06/10

/2019 Unknown

 

          COMPREHENSIVE METABOLIC 58530     Total Protein           6.3 g/dL  06

/10/2019 Unknown

 

          COMPREHENSIVE METABOLIC 87294     Glucose             84 mg/dL  06/10/

2019 Unknown

 

          COMPREHENSIVE METABOLIC 12334     Bicarbonate           25 mmol/L  Unknown

 

          COMPREHENSIVE METABOLIC 16480     AGAP                8 mmol/L  06/10/

2019 Unknown

 

          COMPLETE BLOOD COUNT [id number]   WBC                 7.8 10e9/L 06/10/20

19 Unknown

 

          COMPLETE BLOOD COUNT [id number]   RBC                 4.04 10e12/L 06/10/

2019 Unknown

 

          COMPLETE BLOOD COUNT [id number]   HEMOGLOBIN           12.2 g/dL 06/10/20

19 Unknown

 

          COMPLETE BLOOD COUNT [id number]   HEMATOCRIT           38.6 %    06/10/20

19 Unknown

 

          COMPLETE BLOOD COUNT [id number]   MCV                 95.5 fL   06/10/201

9 Unknown

 

          COMPLETE BLOOD COUNT [id number]   MCH                 30.2 pg   06/10/201

9 Unknown

 

          COMPLETE BLOOD COUNT [id number]   MCHC                31.6 g/dL 06/10/201

9 Unknown

 

          COMPLETE BLOOD COUNT 0197510   PLATELET COUNT           215 10e9/L 06/

10/2019 Unknown

 

          COMPLETE BLOOD COUNT [id number]   Mean Plt Volume           11.2 fL   06/

10/2019 Unknown

 

          COMPLETE BLOOD COUNT [id number]   Neut Auto           45.7 %    06/10/201

9 Unknown

 

          COMPLETE BLOOD COUNT [id number]   Lymph Auto           42.1 %    06/10/20

19 Unknown

 

          COMPLETE BLOOD COUNT [id number]   Mono Auto           9.2 %     06/10/201

9 Unknown

 

          COMPLETE BLOOD COUNT [id number]   RDW                 13.4 %    06/10/201

9 Unknown

 

          COMPLETE BLOOD COUNT [id number]   Eos Auto            2.7 %     06/10/201

9 Unknown

 

          COMPLETE BLOOD COUNT [id number]   Baso Auto           0.3 %     06/10/201

9 Unknown

 

          COMPLETE BLOOD COUNT [id number]   Neutrophil Abs           3.56 10e9/L 06

/10/2019 Unknown

 

          COMPLETE BLOOD COUNT [id number]   Lymphocyte Abs           3.28 10e9/L 06

/10/2019 Unknown

 

          COMPLETE BLOOD COUNT [id number]   Monocyte Abs           0.72 10e9/L  Unknown

 

          COMPLETE BLOOD COUNT [id number]   Eosinophil Abs           0.21 10e9/L 06

/10/2019 Unknown

 

          COMPLETE BLOOD COUNT [id number]   RDW-SD              44.9 fL   06/10/201

9 Unknown

 

          COMPLETE BLOOD COUNT [id number]   Basophil Abs           0.02 10e9/L  Unknown

 

          COMPLETE BLOOD COUNT [id number]   WBC                 5.2 10e9/L 20

18 Unknown

 

          COMPLETE BLOOD COUNT [id number]   RBC                 4.21 10e12/L 2018 Unknown

 

          COMPLETE BLOOD COUNT [id number]   HEMOGLOBIN           12.8 g/dL 20

18 Unknown

 

          COMPLETE BLOOD COUNT [id number]   HEMATOCRIT           39.0 %    20

18 Unknown

 

          COMPLETE BLOOD COUNT [id number]   MCV                 92.6 fL   

8 Unknown

 

          COMPLETE BLOOD COUNT [id number]   MCH                 30.4 pg   

8 Unknown

 

          COMPLETE BLOOD COUNT [id number]   MCHC                32.8 g/dL 

8 Unknown

 

          COMPLETE BLOOD COUNT [id number]   PLATELET COUNT           202 10e9/L  Unknown

 

          COMPLETE BLOOD COUNT [id number]   Mean Plt Volume           10.7 fL    Unknown

 

          COMPLETE BLOOD COUNT [id number]   Neut Auto           40.9 %    

8 Unknown

 

          COMPLETE BLOOD COUNT [id number]   Lymph Auto           46.3 %    20

18 Unknown

 

          COMPLETE BLOOD COUNT [id number]   Mono Auto           9.3 %     

8 Unknown

 

          COMPLETE BLOOD COUNT [id number]   RDW                 13.7 %    

8 Unknown

 

          COMPLETE BLOOD COUNT [id number]   Eos Auto            2.9 %     

8 Unknown

 

          COMPLETE BLOOD COUNT [id number]   Baso Auto           0.6 %     

8 Unknown

 

          COMPLETE BLOOD COUNT [id number]   Neutrophil Abs           2.13 10e9/L  Unknown

 

          COMPLETE BLOOD COUNT [id number]   Lymphocyte Abs           2.41 10e9/L  Unknown

 

          COMPLETE BLOOD COUNT [id number]   Monocyte Abs           0.48 10e9/L  Unknown

 

          COMPLETE BLOOD COUNT [id number]   Eosinophil Abs           0.15 10e9/L  Unknown

 

          COMPLETE BLOOD COUNT [id number]   RDW-SD              45.2 fL   

8 Unknown

 

          COMPLETE BLOOD COUNT [id number]   Basophil Abs           0.03 10e9/L  Unknown

 

          [place] [address]     Glucose             118 mg/dL  Unknown

 

          [place] [address]     CREATININE           1.01 mg/dL  Unknown

 

          [place] [address]     BUN                 14 mg/dL   Unknown

 

          [place] [address]     SODIUM              141 mmol/L  Unknown

 

          [place] [address]     POTASSIUM           4.0 mmol/L  Unknown

 

          [place] [address]     CHLORIDE            108 mmol/L  Unknown

 

          [place] [address]     Bicarbonate           25 mmol/L  Unknown

 

          [place] [address]     AGAP                8 mmol/L   Unknown

 

          [place] [address]     CALCIUM             9.6 mg/dL  Unknown

 

          FREE T4   50101     T4 Free             1.23 ng/dL 2018 Unknown

 

          GFR CALC  8579613   GFR Non Afr Amr           53 mL/min 2018 Unk

nown

 

          GFR CALC  1789795   GFR Afr Amr           >60 mL/min 2018 Unknow

n

 

          THYROID STIMULATING HORMONE 90001     TSH                 2.124 uIU/mL

 2018 Unknown

 

          LIPID GROUP 60298     Cholesterol           152 mg/dL 2018 Unkno

wn

 

          LIPID GROUP 31868     Triglyceride           151 mg/dL 2018 Unkn

own

 

          LIPID GROUP 88991     HDL CHOLESTEROL           47 mg/dL  2018 U

nknown

 

          LIPID GROUP 93121     Chol/HDL Ratio           3.23 ratio 2018 U

nknown

 

          LIPID GROUP 89967     NON-HDL Chol           105 mg/dL 2018 Unkn

own

 

          LIPID GROUP 39587     LDL Cholesterol           75 mg/dL  2018 U

nknown

 

          ASSAY OF TROPONIN QUANT 16637     Troponin-I           <0.30 ng/mL  Unknown

 

          COMPREHENSIVE METABOLIC 64642     AST                 20 U/L    2018 Unknown

 

          COMPREHENSIVE METABOLIC 62638     ALT                 14 U/L    2018 Unknown

 

          COMPREHENSIVE METABOLIC 94860     BUN                 19 mg/dL  2018 Unknown

 

          COMPREHENSIVE METABOLIC 38272     ALBUMIN             4.2 g/dL  2018 Unknown

 

          COMPREHENSIVE METABOLIC 86181     CHLORIDE            102 mmol/L  Unknown

 

          COMPREHENSIVE METABOLIC 21204     Bili Total           0.4 mg/dL  Unknown

 

          COMPREHENSIVE METABOLIC 24532     ALK PHOS            66 U/L    2018 Unknown

 

          COMPREHENSIVE METABOLIC 92580     SODIUM              135 mmol/L  Unknown

 

          COMPREHENSIVE METABOLIC 52565     CREATININE           1.01 mg/dL  Unknown

 

          COMPREHENSIVE METABOLIC 42024     CALCIUM             9.3 mg/dL 2018 Unknown

 

          COMPREHENSIVE METABOLIC 34503     POTASSIUM           4.8 mmol/L  Unknown

 

          COMPREHENSIVE METABOLIC 22567     Total Protein           7.0 g/dL   Unknown

 

          COMPREHENSIVE METABOLIC 28603     Glucose             91 mg/dL  2018 Unknown

 

          COMPREHENSIVE METABOLIC 64175     Bicarbonate           23 mmol/L  Unknown

 

          COMPREHENSIVE METABOLIC 68731     AGAP                10 mmol/L 2018 Unknown

 

          COMPLETE BLOOD COUNT [id number]   WBC                 7.5 10e9/L 20

18 Unknown

 

          COMPLETE BLOOD COUNT [id number]   RBC                 4.13 10e12/L 2018 Unknown

 

          COMPLETE BLOOD COUNT [id number]   HEMOGLOBIN           12.6 g/dL 20

18 Unknown

 

          COMPLETE BLOOD COUNT [id number]   HEMATOCRIT           38.4 %    20

18 Unknown

 

          COMPLETE BLOOD COUNT [id number]   MCV                 93.0 fL   

8 Unknown

 

          COMPLETE BLOOD COUNT [id number]   MCH                 30.5 pg   

8 Unknown

 

          COMPLETE BLOOD COUNT [id number]   MCHC                32.8 g/dL 

8 Unknown

 

          COMPLETE BLOOD COUNT [id number]   PLATELET COUNT           204 10e9/L  Unknown

 

          COMPLETE BLOOD COUNT [id number]   Mean Plt Volume           10.9 fL    Unknown

 

          COMPLETE BLOOD COUNT [id number]   Neut Auto           43.1 %    

8 Unknown

 

          COMPLETE BLOOD COUNT [id number]   Lymph Auto           45.0 %    20

18 Unknown

 

          COMPLETE BLOOD COUNT [id number]   Mono Auto           9.2 %     

8 Unknown

 

          COMPLETE BLOOD COUNT [id number]   RDW                 13.4 %    

8 Unknown

 

          COMPLETE BLOOD COUNT [id number]   Eos Auto            2.3 %     

8 Unknown

 

          COMPLETE BLOOD COUNT [id number]   Baso Auto           0.4 %     

8 Unknown

 

          COMPLETE BLOOD COUNT [id number]   Neutrophil Abs           3.23 10e9/L  Unknown

 

          COMPLETE BLOOD COUNT [id number]   Lymphocyte Abs           3.38 10e9/L  Unknown

 

          COMPLETE BLOOD COUNT [id number]   Monocyte Abs           0.69 10e9/L  Unknown

 

          COMPLETE BLOOD COUNT [id number]   Eosinophil Abs           0.17 10e9/L  Unknown

 

          COMPLETE BLOOD COUNT [id number]   RDW-SD              44.4 fL   

8 Unknown

 

          COMPLETE BLOOD COUNT [id number]   Basophil Abs           0.03 10e9/L  Unknown

 

          GFR CALC  3049590   GFR Non Afr Amr           53 mL/min 2018 Unk

nown

 

          GFR CALC  8981573   GFR Afr Amr           >60 mL/min 2018 Unknow

n

 

          GLYCOSYLATED HEMOGLOBIN TEST 95771     Hgb A1c   11216-8   5.4 %     0

2018 Unknown

 

          MEAN GLUC 9710011   Calc Mean Gluc           108 mg/dL 2018 Unkn

own

 

          MEAN GLUC 4443857   Calc Mean Gluc           114 mg/dL 2017 Unkn

own

 

          LIPID GROUP 99150     Cholesterol           146 mg/dL 2017 Unkno

wn

 

          LIPID GROUP 64612     Triglyceride           119 mg/dL 2017 Unkn

own

 

          LIPID GROUP 73021     HDL CHOLESTEROL           47 mg/dL  2017 U

nknown

 

          LIPID GROUP 96976     Chol/HDL Ratio           3.11 ratio 2017 U

nknown

 

          LIPID GROUP 99549     NON-HDL Chol           99 mg/dL  2017 Unkn

own

 

          LIPID GROUP 78057     LDL Cholesterol           75 mg/dL  2017 U

nknown

 

          GLYCOSYLATED HEMOGLOBIN TEST 35592     Hgb A1c   50056-6   5.6 %     0

2017 Unknown

 

          COMPREHENSIVE METABOLIC 10840     AST                 22 U/L    2017 Unknown

 

          COMPREHENSIVE METABOLIC 27336     ALT                 12 U/L    2017 Unknown

 

          COMPREHENSIVE METABOLIC 33442     BUN                 17 mg/dL  2017 Unknown

 

          COMPREHENSIVE METABOLIC 65418     ALBUMIN             4.0 g/dL  2017 Unknown

 

          COMPREHENSIVE METABOLIC 41448     CHLORIDE            110 mmol/L  Unknown

 

          COMPREHENSIVE METABOLIC 95532     Bili Total           0.4 mg/dL  Unknown

 

          COMPREHENSIVE METABOLIC 72347     ALK PHOS            63 U/L    2017 Unknown

 

          COMPREHENSIVE METABOLIC 88677     SODIUM              140 mmol/L  Unknown

 

          COMPREHENSIVE METABOLIC 99213     CREATININE           1.05 mg/dL  Unknown

 

          COMPREHENSIVE METABOLIC 90926     CALCIUM             9.2 mg/dL 2017 Unknown

 

          COMPREHENSIVE METABOLIC 70843     POTASSIUM           4.2 mmol/L  Unknown

 

          COMPREHENSIVE METABOLIC 15634     Total Protein           6.2 g/dL   Unknown

 

          COMPREHENSIVE METABOLIC 35628     Glucose             87 mg/dL  2017 Unknown

 

          COMPREHENSIVE METABOLIC 81480     Bicarbonate           24 mmol/L  Unknown

 

          COMPREHENSIVE METABOLIC 50818     AGAP                6 mmol/L  2017 Unknown

 

          GFR CALC  6349123   GFR Non Afr Amr           51 mL/min 2017 Unk

nown

 

          GFR CALC  9756673   GFR Afr Amr           >60 mL/min 2017 Unknow

n

 

          COMPLETE BLOOD COUNT [id number]   WBC                 6.7 10e9/L 20

17 Unknown

 

          COMPLETE BLOOD COUNT [id number]   RBC                 4.04 10e12/L 2017 Unknown

 

          COMPLETE BLOOD COUNT [id number]   HEMOGLOBIN           12.1 g/dL 20

17 Unknown

 

          COMPLETE BLOOD COUNT [id number]   HEMATOCRIT           38.0 %    20

17 Unknown

 

          COMPLETE BLOOD COUNT [id number]   MCV                 94.1 fL   

7 Unknown

 

          COMPLETE BLOOD COUNT [id number]   MCH                 30.0 pg   

7 Unknown

 

          COMPLETE BLOOD COUNT [id number]   MCHC                31.8 g/dL 

7 Unknown

 

          COMPLETE BLOOD COUNT [id number]   PLATELET COUNT           206 10e9/L  Unknown

 

          COMPLETE BLOOD COUNT [id number]   Mean Plt Volume           11.3 fL    Unknown

 

          COMPLETE BLOOD COUNT [id number]   Neut Auto           35.8 %    

7 Unknown

 

          COMPLETE BLOOD COUNT [id number]   Lymph Auto           51.6 %    20

17 Unknown

 

          COMPLETE BLOOD COUNT [id number]   Mono Auto           8.8 %     

7 Unknown

 

          COMPLETE BLOOD COUNT [id number]   RDW                 13.5 %    

7 Unknown

 

          COMPLETE BLOOD COUNT [id number]   Eos Auto            3.4 %     

7 Unknown

 

          COMPLETE BLOOD COUNT [id number]   Baso Auto           0.4 %     

7 Unknown

 

          COMPLETE BLOOD COUNT [id number]   Neutrophil Abs           2.40 10e9/L  Unknown

 

          COMPLETE BLOOD COUNT [id number]   Lymphocyte Abs           3.46 10e9/L  Unknown

 

          COMPLETE BLOOD COUNT [id number]   Monocyte Abs           0.59 10e9/L  Unknown

 

          COMPLETE BLOOD COUNT [id number]   Eosinophil Abs           0.23 10e9/L  Unknown

 

          COMPLETE BLOOD COUNT [id number]   RDW-SD              45.3 fL   

7 Unknown

 

          COMPLETE BLOOD COUNT [id number]   Basophil Abs           0.03 10e9/L  Unknown

 

          THYROID STIMULATING HORMONE 62610     TSH                 1.981 uIU/mL

 2017 Unknown

 

          COMPLETE BLOOD COUNT [id number]   WBC                 6.0 10e9/L 20

17 Unknown

 

          COMPLETE BLOOD COUNT [id number]   RBC                 4.29 10e12/L 2017 Unknown

 

          COMPLETE BLOOD COUNT [id number]   HEMOGLOBIN           12.9 g/dL 20

17 Unknown

 

          COMPLETE BLOOD COUNT [id number]   HEMATOCRIT           38.4 %    20

17 Unknown

 

          COMPLETE BLOOD COUNT [id number]   MCV                 89.5 fL   

7 Unknown

 

          COMPLETE BLOOD COUNT [id number]   MCH                 30.1 pg   

7 Unknown

 

          COMPLETE BLOOD COUNT [id number]   MCHC                33.6 g/dL 

7 Unknown

 

          COMPLETE BLOOD COUNT [id number]   PLATELET COUNT           181 10e9/L 2017 Unknown

 

          COMPLETE BLOOD COUNT [id number]   Mean Plt Volume           11.7 fL   2017 Unknown

 

          COMPLETE BLOOD COUNT [id number]   Neut Auto           36.9 %    

7 Unknown

 

          COMPLETE BLOOD COUNT [id number]   Lymph Auto           50.4 %    20

17 Unknown

 

          COMPLETE BLOOD COUNT [id number]   Mono Auto           9.0 %     

7 Unknown

 

          COMPLETE BLOOD COUNT [id number]   RDW                 13.7 %    

7 Unknown

 

          COMPLETE BLOOD COUNT [id number]   Eos Auto            3.4 %     

7 Unknown

 

          COMPLETE BLOOD COUNT [id number]   Baso Auto           0.3 %     

7 Unknown

 

          COMPLETE BLOOD COUNT [id number]   Neutrophil Abs           2.21 10e9/L  Unknown

 

          COMPLETE BLOOD COUNT [id number]   Lymphocyte Abs           3.02 10e9/L  Unknown

 

          COMPLETE BLOOD COUNT [id number]   Monocyte Abs           0.54 10e9/L 2017 Unknown

 

          COMPLETE BLOOD COUNT [id number]   Eosinophil Abs           0.20 10e9/L  Unknown

 

          COMPLETE BLOOD COUNT [id number]   RDW-SD              44.0 fL   

7 Unknown

 

          COMPLETE BLOOD COUNT [id number]   Basophil Abs           0.02 10e9/L 2017 Unknown

 

          GLYCOSYLATED HEMOGLOBIN TEST 05993     Hgb A1c   59102-7   5.4 %     0

3/09/2017 Unknown

 

          THYROID STIMULATING HORMONE 72641     TSH                 2.200 uIU/mL

 2017 Unknown

 

          GFR CALC  7129451   GFR Non Afr Amr           50 mL/min 2017 Unk

nown

 

          GFR CALC  5309957   GFR Afr Amr           >60 mL/min 2017 Unknow

n

 

          MEAN GLUC 8602485   Calc Mean Gluc           108 mg/dL 2017 Unkn

own

 

          COMPREHENSIVE METABOLIC 15832     AST                 18 U/L    2017 Unknown

 

          COMPREHENSIVE METABOLIC 98741     ALT                 10 U/L    2017 Unknown

 

          COMPREHENSIVE METABOLIC 29563     BUN                 20 mg/dL  2017 Unknown

 

          COMPREHENSIVE METABOLIC 36890     ALBUMIN             4.1 g/dL  2017 Unknown

 

          COMPREHENSIVE METABOLIC 16832     CHLORIDE            109 mmol/L  Unknown

 

          COMPREHENSIVE METABOLIC 08950     Bili Total           0.6 mg/dL  Unknown

 

          COMPREHENSIVE METABOLIC 83054     ALK PHOS            64 U/L    2017 Unknown

 

          COMPREHENSIVE METABOLIC 43161     SODIUM              141 mmol/L  Unknown

 

          COMPREHENSIVE METABOLIC 07196     CREATININE           1.06 mg/dL 2017 Unknown

 

          COMPREHENSIVE METABOLIC 49956     CALCIUM             9.9 mg/dL 2017 Unknown

 

          COMPREHENSIVE METABOLIC 46941     POTASSIUM           4.2 mmol/L  Unknown

 

          COMPREHENSIVE METABOLIC 58010     Total Protein           6.3 g/dL   Unknown

 

          COMPREHENSIVE METABOLIC 81720     Glucose             99 mg/dL  2017 Unknown

 

          COMPREHENSIVE METABOLIC 21560     Bicarbonate           21 mmol/L 2017 Unknown

 

          COMPREHENSIVE METABOLIC 35086     AGAP                11 mmol/L 2017 Unknown

 

          LIPID GROUP [id number]     Cholesterol           169 mg/dL 2016 Unkno

wn

 

          LIPID GROUP 10191     Triglyceride           165 mg/dL 2016 Unkn

own

 

          LIPID GROUP [id number]     HDL CHOLESTEROL           43 mg/dL  2016 U

nknown

 

          LIPID GROUP [id number]     Chol/HDL Ratio           3.93 ratio 2016 U

nknown

 

          LIPID GROUP 54971     NON-HDL Chol           126 mg/dL 2016 Unkn

own

 

          LIPID GROUP 10312     LDL Cholesterol           93 mg/dL  2016 U

nknown

 

          COMPREHENSIVE METABOLIC 63625     AST                 18 U/L    2016 Unknown

 

          COMPREHENSIVE METABOLIC 03268     ALT                 10 U/L    2016 Unknown

 

          COMPREHENSIVE METABOLIC 06003     BUN                 20 mg/dL  2016 Unknown

 

          COMPREHENSIVE METABOLIC 75401     ALBUMIN             3.9 g/dL  2016 Unknown

 

          COMPREHENSIVE METABOLIC 37762     CHLORIDE            110 mmol/L  Unknown

 

          COMPREHENSIVE METABOLIC 09109     Bili Total           0.5 mg/dL  Unknown

 

          COMPREHENSIVE METABOLIC 25953     ALK PHOS            72 U/L    2016 Unknown

 

          COMPREHENSIVE METABOLIC 42307     SODIUM              141 mmol/L  Unknown

 

          COMPREHENSIVE METABOLIC 18441     CREATININE           1.12 mg/dL  Unknown

 

          COMPREHENSIVE METABOLIC 10966     CALCIUM             9.7 mg/dL 2016 Unknown

 

          COMPREHENSIVE METABOLIC 81243     POTASSIUM           4.4 mmol/L  Unknown

 

          COMPREHENSIVE METABOLIC 00856     Total Protein           6.2 g/dL   Unknown

 

          COMPREHENSIVE METABOLIC 04255     Glucose             90 mg/dL  2016 Unknown

 

          COMPREHENSIVE METABOLIC 64722     Bicarbonate           23 mmol/L  Unknown

 

          COMPREHENSIVE METABOLIC 72181     AGAP                8 mmol/L  2016 Unknown

 

          GFR CALC  2304143   GFR Non Afr Amr           47 mL/min 2016 Unk

nown

 

          GFR CALC  8145421   GFR Afr Amr           57 mL/min 2016 Unknown

 

          GLYCOSYLATED HEMOGLOBIN TEST 76237     Hgb A1c   97784-6   5.5 %     0

2016 Unknown

 

          THYROID STIMULATING HORMONE 22720     TSH                 2.537 uIU/mL

 2016 Unknown

 

          FREE T4   13728     T4 Free             1.36 ng/dL 2016 Unknown

 

          COMPLETE BLOOD COUNT [id number]   WBC                 6.8 10e9/L 20

16 Unknown

 

          COMPLETE BLOOD COUNT [id number]   RBC                 4.20 10e12/L 2016 Unknown

 

          COMPLETE BLOOD COUNT [id number]   HEMOGLOBIN           12.5 g/dL 20

16 Unknown

 

          COMPLETE BLOOD COUNT 0909978   HEMATOCRIT           38.0 %    20

16 Unknown

 

          COMPLETE BLOOD COUNT [id number]   MCV                 90.5 fL   

6 Unknown

 

          COMPLETE BLOOD COUNT [id number]   MCH                 29.8 pg   

6 Unknown

 

          COMPLETE BLOOD COUNT 0373490   MCHC                32.9 g/dL 

6 Unknown

 

          COMPLETE BLOOD COUNT 1007942   PLATELET COUNT           197 10e9/L  Unknown

 

          COMPLETE BLOOD COUNT 2679170   Mean Plt Volume           11.7 fL    Unknown

 

          COMPLETE BLOOD COUNT [id number]   Neut Auto           41.3 %    

6 Unknown

 

          COMPLETE BLOOD COUNT 0205195   Lymph Auto           47.1 %    20

16 Unknown

 

          COMPLETE BLOOD COUNT 7293163   Mono Auto           7.8 %     

6 Unknown

 

          COMPLETE BLOOD COUNT [id number]   RDW                 13.8 %    

6 Unknown

 

          COMPLETE BLOOD COUNT 4055643   Eos Auto            3.4 %     

6 Unknown

 

          COMPLETE BLOOD COUNT [id number]   Baso Auto           0.4 %     

6 Unknown

 

          COMPLETE BLOOD COUNT [id number]   Neutrophil Abs           2.81 10e9/L  Unknown

 

          COMPLETE BLOOD COUNT 0647928   Lymphocyte Abs           3.20 10e9/L  Unknown

 

          COMPLETE BLOOD COUNT [id number]   Monocyte Abs           0.53 10e9/L  Unknown

 

          COMPLETE BLOOD COUNT 1187811   Eosinophil Abs           0.23 10e9/L  Unknown

 

          COMPLETE BLOOD COUNT [id number]   RDW-SD              44.4 fL   

6 Unknown

 

          COMPLETE BLOOD COUNT [id number]   Basophil Abs           0.03 10e9/L  Unknown

 

          MEAN GLUC 4320310   Calc Mean Gluc           111 mg/dL 2016 Unkn

own

 

          [place] [address]     Glucose             89 MG/DL   Unknown

 

          [place] [address]     CREATININE           1.12 MG/DL  Unknown

 

          [place] [address]     BUN                 20 MG/DL   Unknown

 

          [place] [address]     SODIUM              139 MMOL/L  Unknown

 

          [place] [address]     POTASSIUM           4.6 MMOL/L  Unknown

 

          [place] [address]     CHLORIDE            108 MMOL/L  Unknown

 

          [place] [address]     BICARB              26 MMOL/L  Unknown

 

          [place] [address]     ANION GAP           5 MEQ/L    Unknown

 

          [place] [address]     CALCIUM             10.0 MG/DL  Unknown

 

          GFR CALC  0740986   GFR AA              57.0L ML/MIN 2015 Unknow

n

 

          GFR CALC  7572940   GFR NON-AA           47.0L ML/MIN 2015 Unkno

wn

 

          THYROID STIMULATING HORMONE 50441     TSH                 2.378 uIU/ML

 09/10/2015 Unknown

 

          COMPLETE BLOOD COUNT [id number]   WBC                 6.4 10e9/L 09/10/20

15 Unknown

 

          COMPLETE BLOOD COUNT [id number]   RBC                 3.99 10e12/L 09/10/

2015 Unknown

 

          COMPLETE BLOOD COUNT [id number]   HGB                 11.9 g/dL 09/10/201

5 Unknown

 

          COMPLETE BLOOD COUNT [id number]   HCT DET             36.9 %    09/10/201

5 Unknown

 

          COMPLETE BLOOD COUNT [id number]   MCV                 92.5 fL   09/10/201

5 Unknown

 

          COMPLETE BLOOD COUNT [id number]   MCH                 29.8 pg   09/10/201

5 Unknown

 

          COMPLETE BLOOD COUNT [id number]   MCHC                32.2 g/dL 09/10/201

5 Unknown

 

          COMPLETE BLOOD COUNT [id number]   PLT                 172 10e9/L 09/10/20

15 Unknown

 

          COMPLETE BLOOD COUNT [id number]   MPV                 11.7 fL   09/10/201

5 Unknown

 

          COMPLETE BLOOD COUNT [id number]   ARIK %               40.4 %    09/10/201

5 Unknown

 

          COMPLETE BLOOD COUNT [id number]   LY %                48.0 %    09/10/201

5 Unknown

 

          COMPLETE BLOOD COUNT [id number]   MON %               8.3 %     09/10/201

5 Unknown

 

          COMPLETE BLOOD COUNT [id number]   EOS  %              2.8 %     09/10/201

5 Unknown

 

          COMPLETE BLOOD COUNT [id number]   BASO %              0.5 %     09/10/201

5 Unknown

 

          COMPLETE BLOOD COUNT [id number]   RDW                 13.6 %    09/10/201

5 Unknown

 

          COMPLETE BLOOD COUNT [id number]   ABS ARIK             2.59 10e9/L 09/10/2

015 Unknown

 

          COMPLETE BLOOD COUNT [id number]   ABS LYMPH           3.07 10e9/L 09/10/2

015 Unknown

 

          COMPLETE BLOOD COUNT [id number]   ABS MONO            0.53 10e9/L 09/10/2

015 Unknown

 

          COMPLETE BLOOD COUNT [id number]   ABS EOS             0.18 10e9/L 09/10/2

015 Unknown

 

          COMPLETE BLOOD COUNT 7872813   ABS BASO            0.03 10e9/L 09/10/2

015 Unknown

 

          COMPLETE BLOOD COUNT [id number]   RDW-SD              44.9 fL   09/10/201

5 Unknown

 

          LIPID GROUP [id number]     HDL TEST            42 MG/DL  09/10/2015 Unknown

 

          LIPID GROUP [id number]     TRIG                177 MG/DL 09/10/2015 Unknown

 

          LIPID GROUP [id number]     TEST LDL            72 MG/DL  09/10/2015 Unknown

 

          LIPID GROUP 74390     CHOL                149 MG/DL 09/10/2015 Unknown

 

          LIPID GROUP 61076     RCHOL/HDL           3.55 RATIO 09/10/2015 Unknow

n

 

          LIPID GROUP 73601     NON-HDL CH           107 MG/DL 09/10/2015 Unknow

n

 

          GLYCOSYLATED HEMOGLOBIN TEST 52366     A1C HPLC  42715-5   5.5 %     0

9/10/2015 Unknown

 

          FREE T4   23294     FREE T4             1.39 NG/DL 09/10/2015 Unknown

 

          GFR CALC  1264691   GFR AA              55.0L ML/MIN 09/10/2015 Unknow

n

 

          GFR CALC  7029896   GFR NON-AA           46.0L ML/MIN 09/10/2015 Unkno

wn

 

          COMPREHENSIVE METABOLIC 81948     AST                 17 U/L    09/10/

2015 Unknown

 

          COMPREHENSIVE METABOLIC 39899     ALT                 10 IU/L   09/10/

2015 Unknown

 

          COMPREHENSIVE METABOLIC 72642     BUN                 20 MG/DL  09/10/

2015 Unknown

 

          COMPREHENSIVE METABOLIC 66233     ALBUMIN             3.9 GM/DL 09/10/

2015 Unknown

 

          COMPREHENSIVE METABOLIC 26635     CHLORIDE            111 MMOL/L 09/10

/2015 Unknown

 

          COMPREHENSIVE METABOLIC 36424     BILI TOT            0.4 MG/DL 09/10/

2015 Unknown

 

          COMPREHENSIVE METABOLIC 38383     ALK PHOS            70 U/L    09/10/

2015 Unknown

 

          COMPREHENSIVE METABOLIC 71064     SODIUM              142 MMOL/L 09/10

/2015 Unknown

 

          COMPREHENSIVE METABOLIC 13015     CREATININE           1.16 MG/DL  Unknown

 

          COMPREHENSIVE METABOLIC 79236     CALCIUM             9.4 MG/DL 09/10/

2015 Unknown

 

          COMPREHENSIVE METABOLIC 99142     POTASSIUM           4.6 MMOL/L 09/10

/2015 Unknown

 

          COMPREHENSIVE METABOLIC 65543     PROT TOT            6.2 GM/DL 09/10/

2015 Unknown

 

          COMPREHENSIVE METABOLIC 54066     Glucose             90 MG/DL  09/10/

2015 Unknown

 

          COMPREHENSIVE METABOLIC 37556     BICARB              24 MMOL/L 09/10/

2015 Unknown

 

          COMPREHENSIVE METABOLIC 13921     ANION GAP           7 MEQ/L   09/10/

2015 Unknown

 

          THYROID STIMULATING HORMONE 40502     TSH                 2.427 uIU/ML

 2015 Unknown

 

          LIPID GROUP [id number]     HDL TEST            47 MG/DL  2015 Unknown

 

          LIPID GROUP 77504     TRIG                145 MG/DL 2015 Unknown

 

          LIPID GROUP 75654     TEST LDL            73 MG/DL  2015 Unknown

 

          LIPID GROUP 09052     CHOL                149 MG/DL 2015 Unknown

 

          LIPID GROUP 13881     RCHOL/HDL           3.17 RATIO 2015 Unknow

n

 

          LIPID GROUP 64116     NON-HDL CH           102 MG/DL 2015 Unknow

n

 

          COMPREHENSIVE METABOLIC 02279     AST                 17 U/L    2015 Unknown

 

          COMPREHENSIVE METABOLIC 50044     ALT                 9 IU/L    2015 Unknown

 

          COMPREHENSIVE METABOLIC 48809     BUN                 19 MG/DL  2015 Unknown

 

          COMPREHENSIVE METABOLIC 38134     ALBUMIN             4.3 GM/DL 2015 Unknown

 

          COMPREHENSIVE METABOLIC 40928     CHLORIDE            108 MMOL/L  Unknown

 

          COMPREHENSIVE METABOLIC 33836     BILI TOT            0.5 MG/DL 2015 Unknown

 

          COMPREHENSIVE METABOLIC 79327     ALK PHOS            68 U/L    2015 Unknown

 

          COMPREHENSIVE METABOLIC 00199     SODIUM              140 MMOL/L  Unknown

 

          COMPREHENSIVE METABOLIC 68414     CREATININE           1.08 MG/DL 2015 Unknown

 

          COMPREHENSIVE METABOLIC 43939     CALCIUM             9.9 MG/DL 2015 Unknown

 

          COMPREHENSIVE METABOLIC 33362     POTASSIUM           4.3 MMOL/L  Unknown

 

          COMPREHENSIVE METABOLIC 20303     PROT TOT            7.2 GM/DL 2015 Unknown

 

          COMPREHENSIVE METABOLIC 44753     Glucose             94 MG/DL  2015 Unknown

 

          COMPREHENSIVE METABOLIC 90725     BICARB              26 MMOL/L 2015 Unknown

 

          COMPREHENSIVE METABOLIC 10273     ANION GAP           6 MEQ/L   2015 Unknown

 

          GFR CALC  4036870   GFR AA              60.0L ML/MIN 2015 Unknow

n

 

          GFR CALC  3083275   GFR NON-AA           49.0L ML/MIN 2015 Unkno

wn

 

          GLYCOSYLATED HEMOGLOBIN TEST 29613     A1C HPLC  29889-2   5.6 %     0

3/06/2015 Unknown

 

          COMPLETE BLOOD COUNT 9141626   WBC                 7.2 10e9/L 20

15 Unknown

 

          COMPLETE BLOOD COUNT 8665077   RBC                 4.28 10e12/L 2015 Unknown

 

          COMPLETE BLOOD COUNT 4135250   HGB                 12.8 g/dL 

5 Unknown

 

          COMPLETE BLOOD COUNT 8389895   HCT DET             39.3 %    

5 Unknown

 

          COMPLETE BLOOD COUNT 7174452   MCV                 91.8 fL   

5 Unknown

 

          COMPLETE BLOOD COUNT 5990701   MCH                 29.9 pg   

5 Unknown

 

          COMPLETE BLOOD COUNT 5690448   MCHC                32.6 g/dL 

5 Unknown

 

          COMPLETE BLOOD COUNT [id number]   PLT                 189 10e9/L 20

15 Unknown

 

          COMPLETE BLOOD COUNT 2298141   MPV                 11.2 fL   

5 Unknown

 

          COMPLETE BLOOD COUNT 9293753   ARIK %               38.0 %    

5 Unknown

 

          COMPLETE BLOOD COUNT 8882532   LY %                51.0 %    

5 Unknown

 

          COMPLETE BLOOD COUNT 9441388   MON %               7.7 %     

5 Unknown

 

          COMPLETE BLOOD COUNT 1705795   EOS  %              2.9 %     

5 Unknown

 

          COMPLETE BLOOD COUNT 1808959   BASO %              0.4 %     

5 Unknown

 

          COMPLETE BLOOD COUNT [id number]   RDW                 14.0 %    

5 Unknown

 

          COMPLETE BLOOD COUNT [id number]   ABS ARIK             2.74 10e9/L 

015 Unknown

 

          COMPLETE BLOOD COUNT 5954737   ABS LYMPH           3.67 10e9/L 

015 Unknown

 

          COMPLETE BLOOD COUNT 8958782   ABS MONO            0.55 10e9/L 

015 Unknown

 

          COMPLETE BLOOD COUNT 9646194   ABS EOS             0.21 10e9/L 

015 Unknown

 

          COMPLETE BLOOD COUNT 5409062   ABS BASO            0.03 10e9/L 

015 Unknown

 

          COMPLETE BLOOD COUNT 1429690   RDW-SD              46.1 fL   

5 Unknown

 

          FREE T4   62842     FREE T4             1.14 NG/DL 2015 Unknown

 

          GLYCOSYLATED HEMOGLOBIN TEST 77024     A1C HPLC  50182-1   5.2 %     0

2014 Unknown

 

          FREE T4   84710     FREE T4             1.40 NG/DL 2014 Unknown

 

          GFR CALC  5219901   GFR AA              >60 ML/MIN 2014 Unknown

 

          GFR CALC  6820938   GFR NON-AA           52.0L ML/MIN 2014 Unkno

wn

 

          COMPREHENSIVE METABOLIC 04576     AST                 15 U/L    2014 Unknown

 

          COMPREHENSIVE METABOLIC 97409     ALT                 9 IU/L    2014 Unknown

 

          COMPREHENSIVE METABOLIC 66251     BUN                 17 MG/DL  2014 Unknown

 

          COMPREHENSIVE METABOLIC 32880     ALBUMIN             4.0 GM/DL 2014 Unknown

 

          COMPREHENSIVE METABOLIC 11655     CHLORIDE            112 MMOL/L  Unknown

 

          COMPREHENSIVE METABOLIC 34013     BILI TOT            0.5 MG/DL 2014 Unknown

 

          COMPREHENSIVE METABOLIC 47747     ALK PHOS            66 U/L    2014 Unknown

 

          COMPREHENSIVE METABOLIC 86474     SODIUM              140 MMOL/L  Unknown

 

          COMPREHENSIVE METABOLIC 04232     CREATININE           1.03 MG/DL  Unknown

 

          COMPREHENSIVE METABOLIC 66371     CALCIUM             9.5 MG/DL 2014 Unknown

 

          COMPREHENSIVE METABOLIC 89386     POTASSIUM           4.1 MMOL/L  Unknown

 

          COMPREHENSIVE METABOLIC 28259     PROT TOT            6.2 GM/DL 2014 Unknown

 

          COMPREHENSIVE METABOLIC 91781     Glucose             102 MG/DL 2014 Unknown

 

          COMPREHENSIVE METABOLIC 93659     BICARB              23 MMOL/L 2014 Unknown

 

          COMPREHENSIVE METABOLIC 67981     ANION GAP           5 MEQ/L   2014 Unknown

 

          THYROID STIMULATING HORMONE 36969     TSH                 2.074 uIU/ML

 2014 Unknown

 

          VITAMIN B 12 FOLIC ACID [id number] VIT B 12            423 PG/ML  Unknown

 

          VITAMIN B 12 FOLIC ACID [id number] FOLIC ACID           19.7 NG/ML  Unknown

 

          LIPID GROUP 71142     HDL TEST            40 MG/DL  2014 Unknown

 

          LIPID GROUP 14286     TRIG                145 MG/DL 2014 Unknown

 

          LIPID GROUP 54665     TEST LDL            81 MG/DL  2014 Unknown

 

          LIPID GROUP 42819     CHOL                150 MG/DL 2014 Unknown

 

          LIPID GROUP 37322     RCHOL/HDL           3.75 RATIO 2014 Unknow

n

 

          COMPLETE BLOOD COUNT [id number]   WBC                 6.0 10e9/L 20

14 Unknown

 

          COMPLETE BLOOD COUNT [id number]   RBC                 4.26 10e12/L 2014 Unknown

 

          COMPLETE BLOOD COUNT [id number]   HGB                 12.7 g/dL 

4 Unknown

 

          COMPLETE BLOOD COUNT 6503602   HCT DET             38.7 %    

4 Unknown

 

          COMPLETE BLOOD COUNT [id number]   MCV                 90.8 fL   

4 Unknown

 

          COMPLETE BLOOD COUNT [id number]   MCH                 29.8 pg   

4 Unknown

 

          COMPLETE BLOOD COUNT 1863248   MCHC                32.8 g/dL 

4 Unknown

 

          COMPLETE BLOOD COUNT [id number]   PLT                 178 10e9/L 20

14 Unknown

 

          COMPLETE BLOOD COUNT [id number]   MPV                 11.7 fL   

4 Unknown

 

          COMPLETE BLOOD COUNT [id number]   ARIK %               30.5 %    

4 Unknown

 

          COMPLETE BLOOD COUNT [id number]   LY %                55.4 %    

4 Unknown

 

          COMPLETE BLOOD COUNT [id number]   MON %               9.0 %     

4 Unknown

 

          COMPLETE BLOOD COUNT [id number]   EOS  %              4.4 %     

4 Unknown

 

          COMPLETE BLOOD COUNT [id number]   BASO %              0.7 %     

4 Unknown

 

          COMPLETE BLOOD COUNT [id number]   RDW                 13.3 %    

4 Unknown

 

          COMPLETE BLOOD COUNT [id number]   ABS ARIK             1.83 10e9/L 

014 Unknown

 

          COMPLETE BLOOD COUNT [id number]   ABS LYMPH           3.32 10e9/L 

014 Unknown

 

          COMPLETE BLOOD COUNT [id number]   ABS MONO            0.54 10e9/L 

014 Unknown

 

          COMPLETE BLOOD COUNT 7455535   ABS EOS             0.26 10e9/L 

014 Unknown

 

          COMPLETE BLOOD COUNT [id number]   ABS BASO            0.04 10e9/L 

014 Unknown

 

          COMPLETE BLOOD COUNT [id number]   RDW-SD              43.2 fL   

4 Unknown

 

          HEMOGLOBIN A1C (GLYCOSYLATED) [id number]   A1C HPLC  90370-7   5.5 %     

2012 Unknown

 

          COMPLETE BLOOD COUNT [id number]   WBC                 6.0 10e9/L 20

12 Unknown

 

          COMPLETE BLOOD COUNT [id number]   RBC                 4.22 10e12/L 2012 Unknown

 

          COMPLETE BLOOD COUNT 8471618   HGB                 12.4 g/dL 

2 Unknown

 

          COMPLETE BLOOD COUNT 9278918   HCT DET             38.2 %    

2 Unknown

 

          COMPLETE BLOOD COUNT [id number]   MCV                 90.5 fL   

2 Unknown

 

          COMPLETE BLOOD COUNT [id number]   MCH                 29.4 pg   

2 Unknown

 

          COMPLETE BLOOD COUNT [id number]   MCHC                32.5 g/dL 

2 Unknown

 

          COMPLETE BLOOD COUNT [id number]   PLT                 187 10e9/L 20

12 Unknown

 

          COMPLETE BLOOD COUNT [id number]   MPV                 11.5 fL   

2 Unknown

 

          COMPLETE BLOOD COUNT [id number]   ARIK %               36.4 %    

2 Unknown

 

          COMPLETE BLOOD COUNT 9591504   LY %                51.0 %    

2 Unknown

 

          COMPLETE BLOOD COUNT [id number]   MON %               8.7 %     

2 Unknown

 

          COMPLETE BLOOD COUNT [id number]   EOS  %              3.2 %     

2 Unknown

 

          COMPLETE BLOOD COUNT [id number]   BASO %              0.7 %     

2 Unknown

 

          COMPLETE BLOOD COUNT [id number]   RDW                 13.7 %    

2 Unknown

 

          COMPLETE BLOOD COUNT [id number]   ABS ARIK             2.18 10e9/L 

012 Unknown

 

          COMPLETE BLOOD COUNT [id number]   ABS LYMPH           3.06 10e9/L 

012 Unknown

 

          COMPLETE BLOOD COUNT 7747008   ABS MONO            0.52 10e9/L 

012 Unknown

 

          COMPLETE BLOOD COUNT [id number]   ABS EOS             0.19 10e9/L 

012 Unknown

 

          COMPLETE BLOOD COUNT [id number]   ABS BASO            0.04 10e9/L 

012 Unknown

 

          COMPLETE BLOOD COUNT 9607611   RDW-SD              44.3 fL   

2 Unknown

 

          LIPID GROUP [id number]     HDL TEST            42 MG/DL  2012 Unknown

 

          LIPID GROUP [id number]     TRIG                156 MG/DL 2012 Unknown

 

          LIPID GROUP [id number]     TEST LDL            80 MG/DL  2012 Unknown

 

          LIPID GROUP [id number]     CHOL                153 MG/DL 2012 Unknown

 

          LIPID GROUP [id number]     RCHOL/HDL           3.64 RATIO 2012 Unknow

n

 

          FREE T4   44013     FREE T4             1.22 NG/DL 2012 Unknown

 

          COMPREHENSIVE METABOLIC 46113     AST                 20 U/L    2012 Unknown

 

          COMPREHENSIVE METABOLIC 88220     ALT                 11 IU/L   2012 Unknown

 

          COMPREHENSIVE METABOLIC 20884     BUN                 19 MG/DL  2012 Unknown

 

          COMPREHENSIVE METABOLIC 19018     ALBUMIN             4.3 GM/DL 2012 Unknown

 

          COMPREHENSIVE METABOLIC 70638     CHLORIDE            109 MMOL/L  Unknown

 

          COMPREHENSIVE METABOLIC 36524     BILI TOT            0.6 MG/DL 2012 Unknown

 

          COMPREHENSIVE METABOLIC 65495     ALK PHOS            84 U/L    2012 Unknown

 

          COMPREHENSIVE METABOLIC 78847     SODIUM              142 MMOL/L  Unknown

 

          COMPREHENSIVE METABOLIC 07156     CREATININE           1.09 MG/DL  Unknown

 

          COMPREHENSIVE METABOLIC 37087     CALCIUM             9.8 MG/DL 2012 Unknown

 

          COMPREHENSIVE METABOLIC 98370     POTASSIUM           4.2 MMOL/L  Unknown

 

          COMPREHENSIVE METABOLIC 63231     PROT TOT            6.4 GM/DL 2012 Unknown

 

          COMPREHENSIVE METABOLIC 35480     Glucose             89 MG/DL  2012 Unknown

 

          COMPREHENSIVE METABOLIC 67409     BICARB              25 MMOL/L 2012 Unknown

 

          COMPREHENSIVE METABOLIC 19285     ANION GAP           8 MEQ/L   2012 Unknown

 

          GFR CALC  9778064   GFR AA              60.0L ML/MIN 2012 Unknow

n

 

          GFR CALC  4569428   GFR NON-AA           49.0L ML/MIN 2012 Unkno

wn

 

          THYROID STIMULATING HORMONE 64021     TSH                 2.450 uIU/ML

 2012 Unknown

 

          COMPREHENSIVE METABOLIC 74953     AST                 22 U/L    2012 Unknown

 

          COMPREHENSIVE METABOLIC 89753     ALT                 14 IU/L   2012 Unknown

 

          COMPREHENSIVE METABOLIC 90051     BUN                 21 MG/DL  2012 Unknown

 

          COMPREHENSIVE METABOLIC 66223     ALBUMIN             4.3 GM/DL 2012 Unknown

 

          COMPREHENSIVE METABOLIC 45224     CHLORIDE            106 MMOL/L  Unknown

 

          COMPREHENSIVE METABOLIC 71484     BILI TOT            0.4 MG/DL 2012 Unknown

 

          COMPREHENSIVE METABOLIC 74912     ALK PHOS            80 U/L    2012 Unknown

 

          COMPREHENSIVE METABOLIC 54231     SODIUM              141 MMOL/L  Unknown

 

          COMPREHENSIVE METABOLIC 39936     CREATININE           1.13 MG/DL  Unknown

 

          COMPREHENSIVE METABOLIC 66924     CALCIUM             9.4 MG/DL 2012 Unknown

 

          COMPREHENSIVE METABOLIC 08492     POTASSIUM           4.3 MMOL/L  Unknown

 

          COMPREHENSIVE METABOLIC 13032     PROT TOT            6.7 GM/DL 2012 Unknown

 

          COMPREHENSIVE METABOLIC 04079     Glucose             98 MG/DL  2012 Unknown

 

          COMPREHENSIVE METABOLIC 32364     BICARB              25 MMOL/L 2012 Unknown

 

          COMPREHENSIVE METABOLIC 13628     ANION GAP           10 MEQ/L  2012 Unknown

 

          LIPID GROUP [id number]     HDL TEST            44 MG/DL  2012 Unknown

 

          LIPID GROUP [id number]     TRIG                164 MG/DL 2012 Unknown

 

          LIPID GROUP [id number]     TEST LDL            98 MG/DL  2012 Unknown

 

          LIPID GROUP [id number]     CHOL                175 MG/DL 2012 Unknown

 

          LIPID GROUP [id number]     RCHOL/HDL           3.98 RATIO 2012 Unknow

n

 

          COMPLETE BLOOD COUNT 18735     WBC                 6.7 10e9/L 20

12 Unknown

 

          COMPLETE BLOOD COUNT 61345     RBC                 4.36 10e12/L 2012 Unknown

 

          COMPLETE BLOOD COUNT 48054     HGB                 12.9 g/dL 

2 Unknown

 

          COMPLETE BLOOD COUNT 27056     HCT DET             39.4 %    

2 Unknown

 

          COMPLETE BLOOD COUNT 62739     MCV                 90.4 fL   

2 Unknown

 

          COMPLETE BLOOD COUNT 49347     MCH                 29.6 pg   

2 Unknown

 

          COMPLETE BLOOD COUNT 20825     MCHC                32.7 g/dL 

2 Unknown

 

          COMPLETE BLOOD COUNT 29827     PLT                 184 10e9/L 20

12 Unknown

 

          COMPLETE BLOOD COUNT 61700     MPV                 10.9 fL   

2 Unknown

 

          COMPLETE BLOOD COUNT 60167     ARIK %               41.5 %    

2 Unknown

 

          COMPLETE BLOOD COUNT 41355     LY %                45.7 %    

2 Unknown

 

          COMPLETE BLOOD COUNT 76443     MON %               9.4 %     

2 Unknown

 

          COMPLETE BLOOD COUNT 46438     EOS  %              3.0 %     

2 Unknown

 

          COMPLETE BLOOD COUNT 84469     BASO %              0.4 %     

2 Unknown

 

          COMPLETE BLOOD COUNT 71656     RDW                 13.2 %    

2 Unknown

 

          COMPLETE BLOOD COUNT 89464     ABS ARIK             2.78 10e9/L 

012 Unknown

 

          COMPLETE BLOOD COUNT 52269     ABS LYMPH           3.06 10e9/L 

012 Unknown

 

          COMPLETE BLOOD COUNT 72033     ABS MONO            0.63 10e9/L 

012 Unknown

 

          COMPLETE BLOOD COUNT 00158     ABS EOS             0.20 10e9/L 

012 Unknown

 

          COMPLETE BLOOD COUNT 41931     ABS BASO            0.03 10e9/L 

012 Unknown

 

          COMPLETE BLOOD COUNT 40241     RDW-SD              42.3 fL   

2 Unknown

 

          GFR CALC  5569572   GFR AA              57.0L ML/MIN 2012 Unknow

n

 

          GFR CALC  2324951   GFR NON-AA           47.0L ML/MIN 2012 Unkno

wn

 

          THYROID STIMULATING HORMONE 43290     TSH                 2.663 uIU/ML

 2012 Unknown

 

          FREE T4   34210     FREE T4             1.15 NG/DL 2012 Unknown

 

          THYROID STIMULATING HORMONE 80837     TSH                 1.908 uIU/ML

 2011 Unknown

 

          COMPLETE BLOOD COUNT 16367     WBC                 6.4 10e9/L 20

11 Unknown

 

          COMPLETE BLOOD COUNT 61695     RBC                 3.92 10e12/L 2011 Unknown

 

          COMPLETE BLOOD COUNT 59075     HGB                 11.8 g/dL 

1 Unknown

 

          COMPLETE BLOOD COUNT 39952     HCT DET             36.0 %    

1 Unknown

 

          COMPLETE BLOOD COUNT 86959     MCV                 91.8 fL   

1 Unknown

 

          COMPLETE BLOOD COUNT 54701     MCH                 30.1 pg   

1 Unknown

 

          COMPLETE BLOOD COUNT 76853     MCHC                32.8 g/dL 

1 Unknown

 

          COMPLETE BLOOD COUNT 49375     PLT                 176 10e9/L 20

11 Unknown

 

          COMPLETE BLOOD COUNT 78150     MPV                 11.4 fL   

1 Unknown

 

          COMPLETE BLOOD COUNT 17406     ARIK %               50.4 %    

1 Unknown

 

          COMPLETE BLOOD COUNT 26644     LY %                35.5 %    

1 Unknown

 

          COMPLETE BLOOD COUNT 54046     MON %               10.2 %    

1 Unknown

 

          COMPLETE BLOOD COUNT 73703     EOS  %              3.3 %     

1 Unknown

 

          COMPLETE BLOOD COUNT 07587     BASO %              0.6 %     

1 Unknown

 

          COMPLETE BLOOD COUNT 08425     RDW                 13.7 %    

1 Unknown

 

          COMPLETE BLOOD COUNT 21067     ABS ARIK             3.23 10e9/L 

011 Unknown

 

          COMPLETE BLOOD COUNT 69878     ABS LYMPH           2.27 10e9/L 

011 Unknown

 

          COMPLETE BLOOD COUNT 53370     ABS MONO            0.65 10e9/L 

011 Unknown

 

          COMPLETE BLOOD COUNT 19861     ABS EOS             0.21 10e9/L 

011 Unknown

 

          COMPLETE BLOOD COUNT 42559     ABS BASO            0.04 10e9/L 

011 Unknown

 

          COMPLETE BLOOD COUNT 68423     RDW-SD              45.3 fL   

1 Unknown

 

          GFR CALC  2122103   GFR AA              >60 ML/MIN 2011 Unknown

 

          GFR CALC  8149857   GFR NON-AA           53.0L ML/MIN 2011 Unkno

wn

 

          FREE T4   29460     FREE T4             1.20 NG/DL 2011 Unknown

 

          COMPREHENSIVE METABOLIC 48727     AST                 17 U/L    2011 Unknown

 

          COMPREHENSIVE METABOLIC 75960     ALT                 9 IU/L    2011 Unknown

 

          COMPREHENSIVE METABOLIC 46820     BUN                 16 MG/DL  2011 Unknown

 

          COMPREHENSIVE METABOLIC 81989     ALBUMIN             4.0 GM/DL 2011 Unknown

 

          COMPREHENSIVE METABOLIC 09020     CHLORIDE            108 MMOL/L  Unknown

 

          COMPREHENSIVE METABOLIC 48084     BILI TOT            0.5 MG/DL 2011 Unknown

 

          COMPREHENSIVE METABOLIC 31912     ALK PHOS            76 U/L    2011 Unknown

 

          COMPREHENSIVE METABOLIC 17683     SODIUM              139 MMOL/L  Unknown

 

          COMPREHENSIVE METABOLIC 43866     CREATININE           1.02 MG/DL 2011 Unknown

 

          COMPREHENSIVE METABOLIC 01429     CALCIUM             9.2 MG/DL 2011 Unknown

 

          COMPREHENSIVE METABOLIC 56962     POTASSIUM           4.5 MMOL/L  Unknown

 

          COMPREHENSIVE METABOLIC 93157     PROT TOT            6.1 GM/DL 2011 Unknown

 

          COMPREHENSIVE METABOLIC 73508     Glucose             93 MG/DL  2011 Unknown

 

          COMPREHENSIVE METABOLIC 56544     BICARB              26 MMOL/L 2011 Unknown

 

          COMPREHENSIVE METABOLIC 55128     ANION GAP           5 MEQ/L   2011 Unknown

 

          LIPID GROUP 33888     HDL TEST            46 MG/DL  2011 Unknown

 

          LIPID GROUP 45302     TRIG                102 MG/DL 2011 Unknown

 

          LIPID GROUP 90379     TEST LDL            88 MG/DL  2011 Unknown

 

          LIPID GROUP 71627     CHOL                154 MG/DL 2011 Unknown

 

          LIPID GROUP 42702     RCHOL/HDL           3.35 RATIO 2011 Unknow

n







Procedures





                    Procedure           Codes               Date

 

                    ROUTINE VENIPUNCTURE CPT-4: 68893        2020

 

                    ASSAY THYROID STIM HORMONE CPT-4: 47522        2020

 

                    COMPLETE CBC W/AUTO DIFF WBC CPT-4: 43755        2020

 

                    COMPREHEN METABOLIC PANEL CPT-4: 41663        2020

 

                    ROUTINE VENIPUNCTURE CPT-4: 88361        2019

 

                    LIPID PANEL         CPT-4: 45627        2019

 

                    FLU VACC PRSV FREE INC ANTIG 65 AND OLDER CPT-4: 85315      

  10/22/2019

 

                    FLU VACC PRSV FREE INC ANTIG 65 AND OLDER CPT-4: 68620      

  10/22/2019

 

                    ADMIN INFLUENZA VIRUS VAC CPT-4:         10/22/2019

 

                    COMPREHEN METABOLIC PANEL CPT-4: 86390        10/11/2019

 

                    ROUTINE VENIPUNCTURE CPT-4: 55380        10/11/2019

 

                    ROUTINE VENIPUNCTURE CPT-4: 54214        06/10/2019

 

                    ASSAY THYROID STIM HORMONE CPT-4: 76795        06/10/2019

 

                    COMPREHEN METABOLIC PANEL CPT-4: 87633        06/10/2019

 

                    COMPLETE CBC W/AUTO DIFF WBC CPT-4: 79712        06/10/2019

 

                    URINALYSIS NONAUTO W/O SCOPE CPT-4: 12800        2019

 

                    URINE CULTURE/ COLONY COUNT CPT-4: 54275        2019

 

                    URINE CULTURE/ COLONY COUNT CPT-4: 14824        10/02/2018

 

                    ROUTINE VENIPUNCTURE CPT-4: 15301        2018

 

                    ASSAY OF FREE THYROXINE CPT-4: 35028        2018

 

                    ASSAY THYROID STIM HORMONE CPT-4: 28520        2018

 

                    COMPLETE CBC W/AUTO DIFF WBC CPT-4: 93817        2018

 

                    METABOLIC PANEL TOTAL CA CPT-4: 81861        2018

 

                    FLU VACC PRSV FREE INC ANTIG 65 AND OLDER CPT-4: 36164      

  2018

 

                    ASSAY, GLUCOSE, BLOOD QUANT CPT-4: 42327        2018

 

                    ADMIN INFLUENZA VIRUS VAC CPT-4:         2018

 

                    ROUTINE VENIPUNCTURE CPT-4: 80734        2018

 

                    COMPREHEN METABOLIC PANEL CPT-4: 58722        2018

 

                    COMPLETE CBC W/AUTO DIFF WBC CPT-4: 84405        2018

 

                    A1C HPLC            CPT-4: 23845        2018

 

                    ASSAY OF TROPONIN QUANT CPT-4: 30785        2018

 

                    LIPID PANEL         CPT-4: 70668        2018

 

                    THER/PROPH/DIAG INJ SC/IM CPT-4: 07464        2018

 

                    TRIAMCINOLONE ACET INJ NOS CPT-4:         2018

 

                    URINALYSIS NONAUTO W/O SCOPE CPT-4: 76161        2018

 

                    URINE CULTURE/ COLONY COUNT CPT-4: 18734        2018

 

                    FLU VACC PRSV FREE INC ANTIG 65 AND OLDER CPT-4: 14009      

  10/06/2017

 

                    ADMIN INFLUENZA VIRUS VAC CPT-4:         10/06/2017

 

                    ROUTINE VENIPUNCTURE CPT-4: 28762        2017

 

                    COMPREHEN METABOLIC PANEL CPT-4: 15159        2017

 

                    COMPLETE CBC W/AUTO DIFF WBC CPT-4: 94576        2017

 

                    LIPID PANEL         CPT-4: 64413        2017

 

                    A1C HPLC            CPT-4: 92640        2017

 

                    ASSAY THYROID STIM HORMONE CPT-4: 77291        2017

 

                    ROUTINE VENIPUNCTURE CPT-4: 86407        2017

 

                    ASSAY THYROID STIM HORMONE CPT-4: 23480        2017

 

                    COMPREHEN METABOLIC PANEL CPT-4: 68496        2017

 

                    COMPLETE CBC W/AUTO DIFF WBC CPT-4: 71964        2017

 

                    A1C HPLC            CPT-4: 59253        2017

 

                    FLU VACC PRSV FREE INC ANTIG 65 AND OLDER CPT-4: 98474      

  10/07/2016

 

                    ADMIN INFLUENZA VIRUS VAC CPT-4:         10/07/2016

 

                    ROUTINE VENIPUNCTURE CPT-4: 06511        2016

 

                    ASSAY OF FREE THYROXINE CPT-4: 35840        2016

 

                    ASSAY THYROID STIM HORMONE CPT-4: 00876        2016

 

                    COMPREHEN METABOLIC PANEL CPT-4: 25182        2016

 

                    COMPLETE CBC W/AUTO DIFF WBC CPT-4: 30860        2016

 

                    LIPID PANEL         CPT-4: 21176        2016

 

                    A1C HPLC            CPT-4: 60471        2016

 

                    URINALYSIS NONAUTO W/O SCOPE CPT-4: 88037        2016

 

                    ROUTINE VENIPUNCTURE CPT-4: 18505        2015

 

                    METABOLIC PANEL TOTAL CA CPT-4: 09354        2015

 

                    PRESCRIP TRANSMIT VIA ERX SY CPT-4:         2015

 

                    FLU VACC PRSV FREE INC ANTIG 65 AND OLDER CPT-4: 04941      

  10/16/2015

 

                    ADMIN INFLUENZA VIRUS VAC CPT-4:         10/16/2015

 

                    URINALYSIS NONAUTO W/O SCOPE CPT-4: 44208        09/15/2015

 

                    URINE CULTURE/ COLONY COUNT CPT-4: 46273        09/15/2015

 

                    ROUTINE VENIPUNCTURE CPT-4: 66980        09/10/2015

 

                    ASSAY OF FREE THYROXINE CPT-4: 42354        09/10/2015

 

                    ASSAY THYROID STIM HORMONE CPT-4: 87765        09/10/2015

 

                    COMPREHEN METABOLIC PANEL CPT-4: 58301        09/10/2015

 

                    COMPLETE CBC W/AUTO DIFF WBC CPT-4: 91394        09/10/2015

 

                    LIPID PANEL         CPT-4: 66303        09/10/2015

 

                    A1C HPLC            CPT-4: 16407        09/10/2015

 

                    CERUM REMOVAL       CPT-4: 99130        2015

 

                    PRESCRIP TRANSMIT VIA ERX SY CPT-4:         2015

 

                    FLUZONE, 5ML (Medicare) CPT-4:         10/17/2014

 

                    ADMIN INFLUENZA VIRUS VAC CPT-4:         10/17/2014

 

                    PRESCRIP TRANSMIT VIA ERX SY CPT-4:         2014

 

                    PRESCRIP TRANSMIT VIA ERX SY CPT-4:         2014

 

                    PRESCRIP TRANSMIT VIA ERX SY CPT-4:         2014

 

                    ROUTINE VENIPUNCTURE CPT-4: 74307        2014

 

                    ASSAY OF FREE THYROXINE CPT-4: 58167        2014

 

                    ASSAY THYROID STIM HORMONE CPT-4: 11612        2014

 

                    COMPREHEN METABOLIC PANEL CPT-4: 34159        2014

 

                    COMPLETE CBC W/AUTO DIFF WBC CPT-4: 90716        2014

 

                    LIPID PANEL         CPT-4: 70046        2014

 

                    A1C HPLC            CPT-4: 77752        2014

 

                    VITAMIN B 12 FOLIC ACID CPT-4: 34052|01873  2014

 

                    PRESCRIP TRANSMIT VIA ERX SY CPT-4:         2014

 

                    PRESCRIP TRANSMIT VIA ERX SY CPT-4:         10/15/2013

 

                    FLUZONE, 5ML (Medicare) CPT-4:         2013

 

                    ADMIN INFLUENZA VIRUS VAC CPT-4:         2013

 

                    PRESCRIP TRANSMIT VIA ERX SY CPT-4:         2013

 

                    PRESCRIP TRANSMIT VIA ERX SY CPT-4:         05/10/2013

 

                    ROUTINE VENIPUNCTURE CPT-4: 24624        2012

 

                    ASSAY OF FREE THYROXINE CPT-4: 02846        2012

 

                    ASSAY THYROID STIM HORMONE CPT-4: 38615        2012

 

                    COMPREHEN METABOLIC PANEL CPT-4: 28918        2012

 

                    COMPLETE CBC W/AUTO DIFF WBC CPT-4: 96667        2012

 

                    LIPID PANEL         CPT-4: 04047        2012

 

                    A1C GLYCOSYLATED HEMOGLOBIN TEST CPT-4: 79187        

012

 

                    CERUM REMOVAL       CPT-4: 89198        2012

 

                    PRESCRIP TRANSMIT VIA ERX SY CPT-4:         2012

 

                    PRESCRIP TRANSMIT VIA ERX SY CPT-4:         10/10/2012

 

                    FLUZONE, 5ML (Medicare) CPT-4:         2012

 

                    ADMIN INFLUENZA VIRUS VAC CPT-4:         2012

 

                    ASSAY, GLUCOSE, BLOOD QUANT CPT-4: 87784        2012

 

                    URINALYSIS NONAUTO W/O SCOPE CPT-4: 83429        2012

 

                    URINE CULTURE/ COLONY COUNT CPT-4: 58159        2012

 

                    ROUTINE VENIPUNCTURE CPT-4: 41132        2012

 

                    ASSAY OF FREE THYROXINE CPT-4: 36322        2012

 

                    ASSAY THYROID STIM HORMONE CPT-4: 65110        2012

 

                    COMPREHEN METABOLIC PANEL CPT-4: 06411        2012

 

                    COMPLETE CBC W/AUTO DIFF WBC CPT-4: 14306        2012

 

                    LIPID PANEL         CPT-4: 65177        2012

 

                    ASSAY OF INSULIN    CPT-4: 73491        2012

 

                    A1C GLYCOSYLATED HEMOGLOBIN TEST CPT-4: 17092        

012

 

                    DRAIN/INJECT JOINT/BURSA CPT-4: 52625        2012

 

                    METHYLPREDNISOLONE 40 MG INJ CPT-4:         2012

 

                    TRIAMCINOLONE ACET INJ NOS CPT-4:         2012

 

                    PRESCRIP TRANSMIT VIA ERX SY CPT-4:         2012

 

                    PRESCRIP TRANSMIT VIA ERX SY CPT-4:         2012

 

                    METHYLPREDNISOLONE 40 MG INJ CPT-4:         2012

 

                    DRAIN/INJECT JOINT/BURSA CPT-4: 47555        2012

 

                    TRIAMCINOLONE ACET INJ NOS CPT-4:         2012

 

                    PRESCRIP TRANSMIT VIA ERX SY CPT-4:         2012

 

                    ROUTINE VENIPUNCTURE CPT-4: 32868        2012

 

                    ASSAY OF FREE THYROXINE CPT-4: 80445        2012

 

                    ASSAY THYROID STIM HORMONE CPT-4: 07681        2012

 

                    COMPREHEN METABOLIC PANEL CPT-4: 25505        2012

 

                    COMPLETE CBC W/AUTO DIFF WBC CPT-4: 46223        2012

 

                    LIPID PANEL         CPT-4: 06237        2012

 

                    PRESCRIP TRANSMIT VIA ERX SY CPT-4:         2012

 

                    CERUM REMOVAL       CPT-4: 20931        2011

 

                    PRESCRIP TRANSMIT VIA ERX SY CPT-4:         2011

 

                    FLUZONE, 5ML (Medicare) CPT-4:         10/05/2011

 

                    ADMIN INFLUENZA VIRUS VAC CPT-4:         10/05/2011

 

                    PRESCRIP TRANSMIT VIA ERX SY CPT-4:         2011

 

                    URINALYSIS NONAUTO W/O SCOPE CPT-4: 57031        2011

 

                    URINE CULTURE/ COLONY COUNT CPT-4: 01347        2011

 

                    CUR TOBACCO NON-USER CPT-4:         2011

 

                    ROUTINE VENIPUNCTURE CPT-4: 85793        2011

 

                    COMPLETE CBC W/AUTO DIFF WBC CPT-4: 83088        2011

 

                    COMPREHEN METABOLIC PANEL CPT-4: 41611        2011

 

                    LIPID PANEL         CPT-4: 21233        2011

 

                    ASSAY THYROID STIM HORMONE CPT-4: 88169        2011

 

                    ASSAY OF FREE THYROXINE CPT-4: 36906        2011

 

                    PRESCRIP TRANSMIT VIA ERX SY CPT-4:         2011

 

                    INJ TRIGGER POINT 1/2 MUSCL CPT-4: 40222        2011

 

                    TRIAMCINOLONE ACET INJ NOS CPT-4:         2011

 

                    METHYLPREDNISOLONE 40 MG INJ CPT-4:         2011

 

                    THER/PROPH/DIAG INJ SC/IM CPT-4: 82581        2011

 

                    KETOROLAC TROMETHAMINE INJ CPT-4:         2011

 

                    PRESCRIP TRANSMIT VIA ERX SY CPT-4:         2010

 

                    FLU VACCINE 3 YRS & > IM UP 64 CPT-4: 32499        10/06/201

0

 

                    ADMIN INFLUENZA VIRUS VAC CPT-4:         10/06/2010

 

                    URINALYSIS NONAUTO W/O SCOPE CPT-4: 61118        2010

 

                    URINE CULTURE/ COLONY COUNT CPT-4: 23629        2010

 

                    PRESCRIP TRANSMIT VIA ERX SY CPT-4:         2010

 

                    THER/PROPH/DIAG INJ SC/IM CPT-4: 22667        2010

 

                    VITAMIN B12 INJECTION CPT-4:         2010

 

                    THER/PROPH/DIAG INJ SC/IM CPT-4: 77996        2010

 

                    VITAMIN B12 INJECTION CPT-4:         2010

 

                    ROUTINE VENIPUNCTURE CPT-4: 24005        2010







Vital Signs





                          Date                      Vital

 

                2020      Blood Pressure 1: 138/64 Code: 8480-6 Heart Rate

 1: 52 bpm 

Respiratory Rate: 18 bpm  SpO2: 98%                 Temperature: 36.3 (C) / 97.4

 (F)

 

                    2020          Blood Pressure 1: 144/82 Code: 8480-6 He

art Rate 1: 56 bpm

 

                2020      Blood Pressure 1: 154/86 Code: 8480-6 Heart Rate

 1: 64 bpm 

Respiratory Rate: 20 bpm SpO2: 99%           Temperature: 37.1 (C) / 98.7 (F) We

ight: 215 

lbs 

 

             2019   Blood Pressure 1: 140/70 Code: 8480-6 Heart Rate 1: 57

 bpm SpO2: 99%    

Temperature: 35.9 (C) / 96.6 (F)        Weight: 215 lbs 

 

                06/10/2019      Blood Pressure 1: 144/70 Code: 8480-6 Heart Rate

 1: 60 bpm 

Respiratory Rate: 20 bpm SpO2: 95%           Temperature: 36.4 (C) / 97.6 (F) We

ight: 214 

lbs 

 

                2019      Blood Pressure 1: 128/78 Code: 8480-6 Heart Rate

 1: 56 bpm 

Respiratory Rate: 20 bpm SpO2: 98%           Temperature: 36.3 (C) / 97.4 (F) We

ight: 213 

lbs 

 

                2018      Blood Pressure 1: 142/60 Code: 8480-6 BMI: 38.2 

Code: 65511-2 Heart 

Rate 1: 48 bpm  Height: 5'2"    Respiratory Rate: 20 bpm SpO2: 98%       Tempera

ture: 36.7

(C) / 98.1 (F)                          Weight: 212 lbs 

 

                2018      Blood Pressure 1: 142/64 Code: 8480-6 BMI: 38.5 

Code: 75896-4 Heart 

Rate 1: 52 bpm  Height: 5'2"    Respiratory Rate: 22 bpm SpO2: 96%       Tempera

ture: 36.1

(C) / 96.9 (F)                          Weight: 214 lbs 

 

                10/02/2018      Blood Pressure 1: 124/80 Code: 8480-6 BMI: 38.3 

Code: 87300-8 Heart 

Rate 1: 68 bpm      Height: 5'2"        Respiratory Rate: 20 bpm Temperature: 36

.3 (C) / 

97.4 (F)                                Weight: 213 lbs 

 

                2018      Blood Pressure 1: 132/78 Code: 8480-6 BMI: 37.6 

Code: 12139-8 Heart 

Rate 1: 68 bpm  Height: 5'2"    Respiratory Rate: 20 bpm SpO2: 97%       Tempera

ture: 36.8

(C) / 98.2 (F)                          Weight: 209 lbs 

 

                2018      Blood Pressure 1: 150/76 Code: 8480-6 BMI: 38.5 

Code: 85919-5 Heart 

Rate 1: 64 bpm  Height: 5'2"    Respiratory Rate: 20 bpm SpO2: 97%       Tempera

ture: 36.2

(C) / 97.2 (F)                          Weight: 214 lbs 

 

                2018      Blood Pressure 1: 122/74 Code: 8480-6 BMI: 38.2 

Code: 96219-7 Heart 

Rate 1: 64 bpm  Height: 5'2"    Respiratory Rate: 18 bpm SpO2: 96%       Tempera

ture: 35.8

(C) / 96.4 (F)                          Weight: 212 lbs 

 

                2018      Blood Pressure 1: 124/78 Code: 8480-6 BMI: 37.8 

Code: 35748-4 Heart 

Rate 1: 76 bpm      Height: 5'2"        Respiratory Rate: 20 bpm Temperature: 36

.8 (C) / 

98.3 (F)                                Weight: 210 lbs 

 

                2018      Blood Pressure 1: 136/70 Code: 8480-6 BMI: 38.0 

Code: 06367-1 Heart 

Rate 1: 68 bpm  Height: 5'2"    Respiratory Rate: 20 bpm SpO2: 97%       Tempera

ture: 36.8

(C) / 98.2 (F)                          Weight: 211 lbs 

 

                2018      Blood Pressure 1: 140/65 Code: 8480-6 Heart Rate

 1: 75 bpm 

Respiratory Rate: 24 bpm SpO2: 95%           Temperature: 37.0 (C) / 98.6 (F) We

ight: 211 

lbs 

 

                2018      Blood Pressure 1: 154/70 Code: 8480-6 BMI: 37.6 

Code: 38164-9 Heart 

Rate 1: 76 bpm  Height: 5'2"    Respiratory Rate: 20 bpm SpO2: 98%       Tempera

ture: 36.9

(C) / 98.5 (F)                          Weight: 209 lbs 

 

                2017      Blood Pressure 1: 156/70 Code: 8480-6 BMI: 37.1 

Code: 72296-7 Heart 

Rate 1: 72 bpm  Height: 5'2"    Respiratory Rate: 20 bpm SpO2: 97%       Tempera

ture: 37.0

(C) / 98.6 (F)                          Weight: 206 lbs 

 

                2017      Blood Pressure 1: 152/78 Code: 8480-6 BMI: 36.8 

Code: 05775-7 Heart 

Rate 1: 78 bpm  Height: 5'2"    Respiratory Rate: 20 bpm SpO2: 98%       Tempera

ture: 36.1

(C) / 97.0 (F)                          Weight: 204 lbs 

 

                2017      Blood Pressure 1: 142/70 Code: 8480-6 BMI: 36.9 

Code: 44405-6 Heart 

Rate 1: 64 bpm  Height: 5'2"    Respiratory Rate: 20 bpm SpO2: 96%       Tempera

ture: 36.5

(C) / 97.7 (F)                          Weight: 205 lbs 

 

                2017      Blood Pressure 1: 142/68 Code: 8480-6 Heart Rate

 1: 88 bpm 

Respiratory Rate: 18 bpm SpO2: 98%           Temperature: 36.3 (C) / 97.3 (F) We

ight: 209 

lbs 

 

                2016      Blood Pressure 1: 130/78 Code: 8480-6 Heart Rate

 1: 68 bpm 

Respiratory Rate: 20 bpm SpO2: 95%           Temperature: 36.4 (C) / 97.6 (F) We

ight: 212 

lbs 

 

                2016      Blood Pressure 1: 122/64 Code: 8480-6 BMI: 39.1 

Code: 22415-1 Heart 

Rate 1: 76 bpm      Height: 5'2"        Respiratory Rate: 20 bpm Temperature: 36

.8 (C) / 

98.2 (F)                                Weight: 217 lbs 

 

                2016      Blood Pressure 1: 144/70 Code: 8480-6 BMI: 39.4 

Code: 53330-0 Heart 

Rate 1: 76 bpm      Height: 5'2"        Respiratory Rate: 20 bpm Temperature: 36

.6 (C) / 

97.9 (F)                                Weight: 219 lbs 

 

                2015      Blood Pressure 1: 152/60 Code: 8480-6 BMI: 39.6 

Code: 01627-8 Heart 

Rate 1: 84 bpm      Height: 5'2"        Respiratory Rate: 20 bpm Temperature: 37

.0 (C) / 

98.6 (F)                                Weight: 220 lbs 

 

                2015      Blood Pressure 1: 146/76 Code: 8480-6 BMI: 39.8 

Code: 10389-6 Heart 

Rate 1: 88 bpm      Height: 5'2"        Respiratory Rate: 20 bpm Temperature: 37

.0 (C) / 

98.6 (F)                                Weight: 221 lbs 

 

                2015      Blood Pressure 1: 132/70 Code: 8480-6 BMI: 39.1 

Code: 97153-5 Heart 

Rate 1: 88 bpm      Height: 5'2"        Respiratory Rate: 20 bpm Temperature: 36

.4 (C) / 

97.6 (F)                                Weight: 217 lbs 

 

                2015      Blood Pressure 1: 132/66 Code: 8480-6 BMI: 39.9 

Code: 94014-5 Heart 

Rate 1: 72 bpm      Height: 5'2"        Respiratory Rate: 20 bpm Temperature: 36

.9 (C) / 

98.4 (F)                                Weight: 218 lbs 

 

                2015      Blood Pressure 1: 136/80 Code: 8480-6 Heart Rate

 1: 76 bpm 

Respiratory Rate: 20 bpm  Temperature: 36.7 (C) / 98.0 (F) Weight: 224 lbs 

 

                2015      Blood Pressure 1: 134/78 Code: 8480-6 BMI: 39.7 

Code: 08223-3 Heart 

Rate 1: 84 bpm      Height: 5'2"        Respiratory Rate: 20 bpm Temperature: 36

.7 (C) / 

98.0 (F)                                Weight: 217 lbs 

 

                2014      Blood Pressure 1: 146/78 Code: 8480-6 BMI: 39.5 

Code: 45822-6 Heart 

Rate 1: 82 bpm      Height: 5'2"        Respiratory Rate: 18 bpm Temperature: 35

.6 (C) / 

96.1 (F)                                Weight: 216 lbs 

 

                2014      Blood Pressure 1: 134/70 Code: 8480-6 BMI: 37.9 

Code: 49532-1 Heart 

Rate 1: 80 bpm      Height: 5'3"        Respiratory Rate: 20 bpm Temperature: 36

.8 (C) / 

98.2 (F)                                Weight: 214 lbs 

 

                2014      Blood Pressure 1: 132/70 Code: 8480-6 BMI: 37.6 

Code: 45630-6 Heart 

Rate 1: 80 bpm      Height: 5'3"        Respiratory Rate: 20 bpm Temperature: 36

.8 (C) / 

98.3 (F)                                Weight: 212 lbs 

 

                2014      Blood Pressure 1: 116/74 Code: 8480-6 Heart Rate

 1: 68 bpm 

Respiratory Rate: 20 bpm  Temperature: 36.2 (C) / 97.1 (F) Weight: 212 lbs 

 

                10/15/2013      Blood Pressure 1: 132/82 Code: 8480-6 BMI: 37.4 

Code: 84256-9 Heart 

Rate 1: 76 bpm      Height: 5'3"        Respiratory Rate: 20 bpm Temperature: 36

.7 (C) / 

98.0 (F)                                Weight: 211 lbs 

 

                    2013          Blood Pressure 1: 130/76 Code: 8480-6 He

art Rate 1: 78 bpm

 

                2013      Blood Pressure 1: 140/82 Code: 8480-6 BMI: 36.8 

Code: 92486-3 Heart 

Rate 1: 66 bpm      Height: 5'3"        Respiratory Rate: 20 bpm Temperature: 36

.1 (C) / 

96.9 (F)                                Weight: 208 lbs 

 

                2013      Blood Pressure 1: 138/80 Code: 8480-6 BMI: 36.4 

Code: 79398-0 Heart 

Rate 1: 72 bpm      Height: 5'4"        Respiratory Rate: 20 bpm Temperature: 36

.7 (C) / 

98.0 (F)                                Weight: 212 lbs 

 

                2013      Blood Pressure 1: 124/70 Code: 8480-6 BMI: 36.9 

Code: 50631-4 Heart 

Rate 1: 60 bpm      Height: 5'4"        Temperature: 36.1 (C) / 97.0 (F) Weight:

 215 lbs 

 

                05/10/2013      Blood Pressure 1: 132/86 Code: 8480-6 BMI: 36.9 

Code: 28007-0 Heart 

Rate 1: 76 bpm      Height: 5'4"        Respiratory Rate: 20 bpm Temperature: 36

.8 (C) / 

98.2 (F)                                Weight: 215 lbs 

 

                2013      Blood Pressure 1: 134/82 Code: 8480-6 BMI: 36.6 

Code: 55981-3 Heart 

Rate 1: 72 bpm      Height: 5'4"        Respiratory Rate: 20 bpm Temperature: 36

.3 (C) / 

97.4 (F)                                Weight: 213 lbs 

 

                2012      Blood Pressure 1: 142/80 Code: 8480-6 BMI: 37.1 

Code: 91174-3 Heart 

Rate 1: 76 bpm      Height: 5'4"        Respiratory Rate: 20 bpm Temperature: 36

.8 (C) / 

98.3 (F)                                Weight: 216 lbs 

 

                10/23/2012      Blood Pressure 1: 128/68 Code: 8480-6 BMI: 37.6 

Code: 92688-4 Heart 

Rate 1: 72 bpm      Height: 5'4"        Respiratory Rate: 20 bpm Temperature: 36

.6 (C) / 

97.9 (F)                                Weight: 219 lbs 

 

                10/10/2012      Blood Pressure 1: 122/70 Code: 8480-6 Heart Rate

 1: 76 bpm 

Respiratory Rate: 20 bpm  Temperature: 36.7 (C) / 98.1 (F) Weight: 218 lbs 

 

                    2012          Blood Pressure 1: 132/80 Code: 8480-6 He

art Rate 1: 84 bpm

 

                2012      Blood Pressure 1: 128/78 Code: 8480-6 BMI: 38.1 

Code: 09528-4 Heart 

Rate 1: 84 bpm      Height: 5'4"        Respiratory Rate: 20 bpm Temperature: 36

.9 (C) / 

98.4 (F)                                Weight: 222 lbs 

 

                2012      Blood Pressure 1: 138/80 Code: 8480-6 BMI: 37.9 

Code: 79627-8 Heart 

Rate 1: 74 bpm      Height: 5'4"        Temperature: 36.1 (C) / 97.0 (F) Weight:

 221 lbs 

 

                2012      Blood Pressure 1: 126/78 Code: 8480-6 BMI: 38.4 

Code: 52200-9 Heart 

Rate 1: 72 bpm      Height: 5'4"        Respiratory Rate: 20 bpm Temperature: 36

.7 (C) / 

98.0 (F)                                Weight: 224 lbs 

 

                2012      Blood Pressure 1: 138/72 Code: 8480-6 BMI: 38.4 

Code: 15047-7 Heart 

Rate 1: 72 bpm      Height: 5'4"        Respiratory Rate: 20 bpm Temperature: 36

.6 (C) / 

97.9 (F)                                Weight: 224 lbs 

 

                2012      Blood Pressure 1: 122/78 Code: 8480-6 BMI: 38.8 

Code: 89347-0 Heart 

Rate 1: 88 bpm      Height: 5'4"        Respiratory Rate: 20 bpm Temperature: 36

.6 (C) / 

97.8 (F)                                Weight: 226 lbs 

 

                2012      Blood Pressure 1: 116/60 Code: 8480-6 BMI: 38.1 

Code: 58750-1 Heart 

Rate 1: 92 bpm      Height: 5'4"        Respiratory Rate: 20 bpm Temperature: 36

.8 (C) / 

98.2 (F)                                Weight: 222 lbs 

 

                2011      Blood Pressure 1: 118/62 Code: 8480-6 BMI: 37.9 

Code: 95476-7 Heart 

Rate 1: 80 bpm      Height: 5'4"        Temperature: 36.5 (C) / 97.7 (F) Weight:

 221 lbs 

 

                2011      Blood Pressure 1: 132/70 Code: 8480-6 Heart Rate

 1: 84 bpm 

Respiratory Rate: 20 bpm  Temperature: 36.7 (C) / 98.0 (F) Weight: 221 lbs 

 

                2011      Blood Pressure 1: 114/72 Code: 8480-6 BMI: 37.6 

Code: 98261-0 Heart 

Rate 1: 76 bpm      Height: 5'4"        Respiratory Rate: 20 bpm Temperature: 36

.8 (C) / 

98.3 (F)                                Weight: 219 lbs 

 

                    2011          Blood Pressure 1: 136/76 Code: 8480-6 Te

mperature: 36.0 (C) / 96.8 

(F)                                     Weight: 219 lbs 8 oz

 

                2011      Blood Pressure 1: 128/80 Code: 8480-6 Heart Rate

 1: 72 bpm 

Temperature: 36.3 (C) / 97.4 (F)        Weight: 218 lbs 

 

                2011      Blood Pressure 1: 126/70 Code: 8480-6 Heart Rate

 1: 72 bpm 

Temperature: 36.7 (C) / 98.0 (F)

 

                    2010          Blood Pressure 1: 126/78 Code: 8480-6 Te

mperature: 36.2 (C) / 97.1 

(F)                                     Weight: 217 lbs 8 oz

 

                2010      Blood Pressure 1: 116/70 Code: 8480-6 Heart Rate

 1: 72 bpm 

Temperature: 36.4 (C) / 97.6 (F)        Weight: 222 lbs 

 

                2010      Blood Pressure 1: 114/78 Code: 8480-6 Heart Rate

 1: 72 bpm 

Temperature: 37.1 (C) / 98.8 (F)        Weight: 225 lbs 

 

                2010      Blood Pressure 1: 128/78 Code: 8480-6 Heart Rate

 1: 76 bpm 

Temperature: 36.6 (C) / 97.8 (F)        Weight: 224 lbs 

 

                2010      Blood Pressure 1: 116/78 Code: 8480-6 Heart Rate

 1: 80 bpm 

Temperature: 36.4 (C) / 97.6 (F)        Weight: 226 lbs 

 

                2010      Blood Pressure 1: 120/70 Code: 8480-6 BMI: 38.3 

Code: 21888-5 Heart 

Rate 1: 88 bpm      Height: 5'5"        Temperature: 36.4 (C) / 97.6 (F) Weight:

 230 lbs 







Functional Status

No Functional Status data



Reason For Visit





                    Reason For Visit    Effective Dates     Notes

 

                    follow up           2020           

 

                    blood pressure check 2020           

 

                    high blood pressure 2020           

 

                    lab draw            2019           

 

                    lab draw            10/11/2019           

 

                    hearing loss        2019          left ear

 

                    follow up           06/10/2019           

 

                    follow up           2019          Patient has upcoming

 appointment with Dr Claire and carotid 

dopplers tomorrow

 

                    follow up           2018          Patient had heart ca

th on 10-30-18 and one of the bypass 

veins in spasming

 

                    follow up           2018          4 Week

 

                    follow up           10/02/2018           

 

                    follow up           2018           

 

                    high blood pressure 2018          Dr Claire has ordered

 holter monitor and 

hydralazine 50mg PRN

 

                    dizziness           2018          On  morning darren delvalle woke up and went to the bathroom 

and after lying down became very dizzy. Patient has hx of vertigo so didn't 
think anything of it. She usually just has them intermittently, but had more 
that day. While at [employer] began to feel very ill and became very shaky. She got 
home and sat in the recliner and had the jittery/nervous feeling. Later that 
night it finally resolved. Patient feels like she had a spell like this around 
the  and thought it was due to extreme heat exhaustion.

 

                    follow up           2018           

 

                    back pain           2018           

 

                    otalgia             2018           

 

                    back pain           2018           

 

                    injection(s)        10/06/2017          flu shot

 

                    lab draw            2017           

 

                    follow up           2017           

 

                    pelvic pain         2017          Patient states pain 

started in May with a "Constant Ache"

to left inguinal area. Patient states at that time pain was intermittent. Then, 
approximately 2nd week  of  pain worsened and radiates to left low back 
area.

 

                    lab draw            2017           

 

                    hyperglycemia       2017           

 

                    cough               2017           

 

                    otalgia             2016           

 

                    injection(s)        10/07/2016          Influenza

 

                    lab draw            2016           

 

                    constipation        2016           

 

                    flank pain          2016           

 

                    follow up           2015          3mo fwup

 

                    injection(s)        10/16/2015          Influenza

 

                    acute renal failure 09/15/2015           

 

                    lab draw            09/10/2015           

 

                    fatigue             2015           

 

                    otalgia             2015           

 

                    pain, limb          2015           

 

                    follow up           2015          [facility]

 

                    high blood pressure 2015           

 

                    injection(s)        10/17/2014          flu shot

 

                    sinusitis           2014           

 

                    high blood pressure 2014           

 

                    cough               2014          Was panfilo at Dr Tyree teixeira's office so he started he on amlodipine 

10mg but seems to be dragging her down. Patient decreased to 1/2 tablet this 
last week

 

                    lab draw            2014           

 

                    cough               2014           

 

                    cough               10/15/2013           

 

                    high blood pressure 2013           

 

                    follow up           2013          [facility]

 

                    dizziness           2013           

 

                    follow up           2013           

 

                    otalgia             05/10/2013           

 

                    breast complaint    2013           

 

                    lab draw            2012           

 

                    follow up           2012          2mo fwup

 

                    follow up           10/23/2012          2wk fwup

 

                    gas and bloating    10/10/2012          Dr Claire has her mon

itoring because was high in his 

office at last visit

 

                    injection(s)        2012           

 

                    dizziness           2012           

 

                    dizziness           2012           

 

                    lab draw            2012           

 

                    muscle weakness     2012          concerns of simvasta

tin with fatigue and muscle 

weakness

 

                    leg pain/sciatica   2012           

 

                    hip pain            2012           

 

                    back pain           2012          has tried ice pack, 

muscle relaxers, and moist heat

 

                    back pain           2012           

 

                    fatigue             2012          in hands/feet

 

                    otalgia             2011           

 

                    follow up           2011          2wk fwup

 

                    back pain           2011          thinks ulcer may hav

e returned

 

                    lab draw            2011           

 

                    dizziness           2011          Left ear and facial 

pain, right ear pain 

 

                    follow up           2011          1wk fwup

 

                    hip pain            2011          feels very draggy, f

atanish, BP 80/63, normally running 

100's/60's

 

                    chills              2010           

 

                    injection(s)        10/06/2010          flu shot

 

                    follow up           2010          6wk fwup, had HH rep

aired and is starting to feel better, 

started on reglan 10mg tid

 

                    follow up           2010          hosp fwup

 

                    follow up           2010          1mo fwup, saw Dr Danna du and hasn't gave cardiac clearance 

yet for HH repair, did have chemical stress test yesterday and waiting on 
results

 

                    follow up           2010          from EGD/colonoscopy

--has Hiatal Hernia and wants to do 

repair

 

                    follow up           2010           







Encounters





             Encounter    Performer    Location     Codes        Date

 

                                        () OFFICE/OUTPATIENT VISIT EST

Diagnosis: Essential (primary) hypertension[ICD10: I10]

Diagnosis: Palpitations[ICD10: R00.2] Akanksha EWING                    CPT-4: 12191              2020

 

                                        ([id number]) OFFICE/OUTPATIENT VISIT EST

Diagnosis: Essential hypertension[ICD10: I10]

Diagnosis: Palpitations[ICD10: R00.2]

Diagnosis: Stress reaction[ICD10: F43.0] [facility]            CPT-4: 66029              2020

 

                                        (10115) NURSE/OUTPATIENT VISIT EST

Diagnosis: Mixed hyperlipidemia[ICD10: E78.2] Akanksha DRIVER DO [facility]            CPT-4: 84979              2019

 

                                        ([id number]) NURSE/OUTPATIENT VISIT EST

Diagnosis: FLU VACCINE[ICD10: Z23] Akanksha KEY DO 

[facility]                       CPT-4: 33915              10/22/2019

 

                                        ([id number]) NURSE/OUTPATIENT VISIT EST

Diagnosis: Chronic kidney disease, stage 1[ICD10: N18.1] Akanksha DRIVER DO [facility] CPT-4: 90900              10/11/2019

 

                                        ([id number]) OFFICE/OUTPATIENT VISIT EST

Diagnosis: Acute serous otitis media, left ear[ICD10: H65.02] Nat DRIVER DO [facility] CPT-4: 63616              2019

 

                                        ([id number]) OFFICE/OUTPATIENT VISIT EST

Diagnosis: Essential (primary) hypertension[ICD10: I10]

Diagnosis: Coronary atherosclerosis due to calcified coronary lesion[ICD10: 
I25.84]

Diagnosis: Hypoglycemia, unspecified[ICD10: E16.2]

Diagnosis: Other fatigue[ICD10: R53.83]

Diagnosis: Urinary tract infection, site not specified[ICD10: N39.0] Akanksha DRIVER DO [facility] CPT-4: 64682        06/10/2019

 

                                        ([id number]) OFFICE/OUTPATIENT VISIT EST

Diagnosis: Essential (primary) hypertension[ICD10: I10]

Diagnosis: Gastro-esophageal reflux disease without esophagitis[ICD10: K21.9]

Diagnosis: Urinary tract infection, site not specified[ICD10: N39.0] Akanksha DRIVER DO [facility] CPT-4: 37780        2019

 

                                        ([id number]) OFFICE/OUTPATIENT VISIT EST

Diagnosis: Gastro-esophageal reflux disease without esophagitis[ICD10: K21.9]

Diagnosis: Epigastric pain[ICD10: R10.13] [facility]            CPT-4: 75913              2018

 

                                        (17884) OFFICE/OUTPATIENT VISIT EST

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

Diagnosis: Essential (primary) hypertension[ICD10: I10]

Diagnosis: Generalized anxiety disorder[ICD10: F41.1]

Diagnosis: Gastro-esophageal reflux disease without esophagitis[ICD10: K21.9] 

Akanksha DRIVER [facility] CPT-4: 33744        2018

 

                                        OFFICE/OUTPATIENT VISIT EST

Diagnosis: Gastro-esophageal reflux disease without esophagitis[ICD10: K21.9]

Diagnosis: Palpitations[ICD10: R00.2]

Diagnosis: Urinary tract infection, site not specified[ICD10: N39.0]

Diagnosis: Generalized anxiety disorder[ICD10: F41.1] Akanksha DRIVER [facility] CPT-4: 95102              10/02/2018

 

                                        (33630) NURSE/OUTPATIENT VISIT EST

Diagnosis: Coronary atherosclerosis due to calcified coronary lesion[ICD10: 
I25.84]

Diagnosis: Hypoglycemia, unspecified[ICD10: E16.2]

Diagnosis: Dizziness and giddiness[ICD10: R42]

Diagnosis: Occlusion and stenosis of bilateral carotid arteries[ICD10: I65.23] 

Akanksha DRIVER [facility] CPT-4: 54658        2018

 

                                        OFFICE/OUTPATIENT VISIT EST

Diagnosis: Epigastric pain[ICD10: R10.13]

Diagnosis: Generalized anxiety disorder[ICD10: F41.1]

Diagnosis: FLU VACCINE[ICD10: Z23] Akanksha SPENCER

[facility]                       CPT-4: 36917              2018

 

                                        (96548) OFFICE/OUTPATIENT VISIT EST

Diagnosis: Essential (primary) hypertension[ICD10: I10]

Diagnosis: Hypoglycemia, unspecified[ICD10: E16.2]

Diagnosis: Gastro-esophageal reflux disease without esophagitis[ICD10: K21.9] 

Akanksha DRIVER [facility] CPT-4: 49321        2018

 

                                        ([id number]) OFFICE/OUTPATIENT VISIT EST

Diagnosis: Dizziness and giddiness[ICD10: R42] Nat DRIVER [facility]            CPT-4: 99073              2018

 

                                        ([id number]) OFFICE/OUTPATIENT VISIT EST

Diagnosis: Cervicalgia[ICD10: M54.2]

Diagnosis: Other spondylosis with radiculopathy, cervical region[ICD10: M47.22] 

Akanksha DRIVER [facility] CPT-4: 03182        2018

 

                                        (47696) OFFICE/OUTPATIENT VISIT EST

Diagnosis: Hypoglycemia, unspecified[ICD10: E16.2]

Diagnosis: Other spondylosis with radiculopathy, cervical region[ICD10: M47.22]

Diagnosis: Vertigo of central origin, bilateral[ICD10: H81.43]

Diagnosis: Cervicocranial syndrome[ICD10: M53.0] Akanksha Villa DRIVER [facility]         CPT-4: 85809              2018

 

                                        (20877) OFFICE/OUTPATIENT VISIT EST

Diagnosis: Benign paroxysmal vertigo, bilateral[ICD10: H81.13]

Diagnosis: Otalgia, bilateral[ICD10: H92.03] Nat DRIVER [facility]            CPT-4: 23323              2018

 

                                        (44814) OFFICE/OUTPATIENT VISIT EST

Diagnosis: Low back pain[ICD10: M54.5]

Diagnosis: Radiculopathy, lumbosacral region[ICD10: M54.17]

Diagnosis: Left lower quadrant pain[ICD10: R10.32] Akanksha DE LA ROSA

OLIVIA SPENCER [facility]         CPT-4: 72502              2018

 

                                        (06956) OFFICE/OUTPATIENT VISIT EST

Diagnosis: FLU VACCINE[ICD10: Z23] Akanksha Xiangrodo KEVINQUELINE OLIVIA KEY [facility] 

[facility]                       CPT-4: 15571              10/06/2017

 

                                        (21786) OFFICE/OUTPATIENT VISIT EST

Diagnosis: Mixed hyperlipidemia[ICD10: E78.2]

Diagnosis: Essential (primary) hypertension[ICD10: I10]

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

Diagnosis: Impaired fasting glucose[ICD10: R73.01]

Diagnosis: Other fatigue[ICD10: R53.83] Akanksha DRIVER DO [facility]                    CPT-4: 27081              2017

 

                                        (68885) OFFICE/OUTPATIENT VISIT EST

Diagnosis: Pain in thoracic spine[ICD10: M54.6]

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 

Akanksha DRIVER DO [facility] CPT-4: 52744        2017

 

                                        ([id number]) OFFICE/OUTPATIENT VISIT EST

Diagnosis: Left lower quadrant pain[ICD10: R10.32]

Diagnosis: Low back pain[ICD10: M54.5] Akanksha HIGHTOWERLINE OLIVIA SYKES DO [facility]                    CPT-4: 55703              2017

 

                                        (82323) OFFICE/OUTPATIENT VISIT EST

Diagnosis: Mixed hyperlipidemia[ICD10: E78.2]

Diagnosis: Essential (primary) hypertension[ICD10: I10]

Diagnosis: Hypoglycemia, unspecified[ICD10: E16.2] Akanksha DRIVER [facility]         CPT-4: 47137              2017

 

                                        (21083) OFFICE/OUTPATIENT VISIT EST

Diagnosis: Impaired fasting glucose[ICD10: R73.01]

Diagnosis: Mastodynia[ICD10: N64.4]

Diagnosis: Mixed hyperlipidemia[ICD10: E78.2]

Diagnosis: Essential (primary) hypertension[ICD10: I10]

Diagnosis: Other fatigue[ICD10: R53.83] Akanksha Xiangndangela HIGHTOWERLINE SAramis 

[facility]ILL[facility]

[facility]                    CPT-4: 68427              2017

 

                                        (49333) OFFICE/OUTPATIENT VISIT EST

Diagnosis: Acute upper respiratory infection, unspecified[ICD10: J06.9] Luba HIGHTOWERLINE OLIVIA DRIVER DO [facility] CPT-4: 97500        2017

 

                                        ([id number]) OFFICE/OUTPATIENT VISIT EST

Diagnosis: Acute mastoiditis without complications, right ear[ICD10: H70.001] 

Aknakshatammy KEVINQUELINE SAramis [facility] CPT-4: 28049        2016

 

                                        (15202) OFFICE/OUTPATIENT VISIT EST

Diagnosis: FLU VACCINE[ICD10: Z23] Akanksha BAUMAN SAramis TJ KEY [facility]                       CPT-4: 23443              10/07/2016

 

                                        (41664) OFFICE/OUTPATIENT VISIT EST

Diagnosis: Mixed hyperlipidemia[ICD10: E78.2]

Diagnosis: Essential (primary) hypertension[ICD10: I10]

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

Diagnosis: Impaired fasting glucose[ICD10: R73.01] Akanksha DRIVER [facility]         CPT-4: 13233              2016

 

                                        (61171) OFFICE/OUTPATIENT VISIT EST

Diagnosis: Constipation, unspecified[ICD10: K59.00] Akanksha DRIVER [facility] CPT-4: 77778              2016

 

                                        (56665) OFFICE/OUTPATIENT VISIT EST

Diagnosis: Essential (primary) hypertension[ICD10: I10]

Diagnosis: Mixed hyperlipidemia[ICD10: E78.2]

Diagnosis: Chronic kidney disease, stage 1[ICD10: N18.1] Akanksha DRIVER [facility] CPT-4: 77112              2016

 

                                        ([id number]) OFFICE/OUTPATIENT VISIT EST

Diagnosis: Muscle weakness (generalized)[ICD10: M62.81]

Diagnosis: Dizziness and giddiness[ICD10: R42]

Diagnosis: Essential (primary) hypertension[ICD10: I10]

Diagnosis: History of falling[ICD10: Z91.81] Akanksha Otoolendangela DRIVER [facility]            CPT-4: 76683              2015

 

                                        (64059) OFFICE/OUTPATIENT VISIT EST

Diagnosis: FLU VACCINE[ICD10: Z23] Akanksha KEY [facility]                       CPT-4: 63359              10/16/2015

 

                                        (16392) OFFICE/OUTPATIENT VISIT EST

Diagnosis: Acute renal failure[ICD9: 584.9]

Diagnosis: POLYURIA[ICD9: 788.42] Akanksha LANCE [facility]                       CPT-4: 65779              09/15/2015

 

                                        (02311) OFFICE/OUTPATIENT VISIT EST

Diagnosis: HYPERLIPIDEMIA NEC/NOS[ICD9: 272.4]

Diagnosis: HYPERTENSION[ICD9: 401.9]

Diagnosis: CAD[ICD9: 414.00]

Diagnosis: Hyperglycemia[ICD9: 790.29]

Diagnosis: MALAISE AND FATIGUE[ICD9: 780.79] Akanksha DRIVER DO [facility]            CPT-4: 98991              09/10/2015

 

                                        (36528) OFFICE/OUTPATIENT VISIT EST

Diagnosis: MALAISE AND FATIGUE[ICD9: 780.79]

Diagnosis: HYPOGLYCEMIA[ICD9: 251.2]

Diagnosis: Grieving[ICD9: 309.0]

Diagnosis: ABDOMINAL PAIN[ICD9: 789.00] Akanksha DRIVER DO [facility]                    CPT-4: 71474              2015

 

                                        OFFICE/OUTPATIENT VISIT EST

Diagnosis: CERUMEN IMPACTION[ICD9: 380.4]

Diagnosis: EUSTACHIAN TUBE DYSFUNCTION[ICD9: 381.81] Bertha FuentesLilayuri DRIVER DO [facility] CPT-4: 52558              2015

 

                                        (24822) OFFICE/OUTPATIENT VISIT EST

Diagnosis: Leg pain[ICD9: 729.5]

Diagnosis: SUPERFIC PHLEBITIS-LEG[ICD9: 451.0] Akanksha DRIVER [facility]            CPT-4: 05840              2015

 

                                        (84693) OFFICE/OUTPATIENT VISIT EST

Diagnosis: Thoracic back pain[ICD9: 724.1]

Diagnosis: SPASM OF MUSCLE[ICD9: 728.85] Akanksha DRIVER DO [facility]            CPT-4: 82391              2015

 

                                        (31033) OFFICE/OUTPATIENT VISIT EST

Diagnosis: DIZZINESS/VERTIGO[ICD9: 780.4]

Diagnosis: Benign positional vertigo[ICD9: 386.11]

Diagnosis: HYPERTENSION[ICD9: 401.9]

Diagnosis: Suspicious nevus[ICD9: 238.2] Akanksha DRIVER DO [facility]            CPT-4: 57519              2015

 

                                        (23102) OFFICE/OUTPATIENT VISIT EST

Diagnosis: FLU VACCINE[ICD10: Z23] Akanksha KEY DO 

[facility]                       CPT-4: 49737              10/17/2014

 

                                        OFFICE/OUTPATIENT VISIT EST

Diagnosis: SINUSITIS, ACUTE[ICD9: 461.9]

Diagnosis: EUSTACHIAN TUBE DYSFUNCTION[ICD9: 381.81] Bertha DRIVER [facility] CPT-4: 52591              2014

 

                                        (31944) OFFICE/OUTPATIENT VISIT EST

Diagnosis: DIZZINESS/VERTIGO[ICD9: 780.4]

Diagnosis: HYPERTENSION[ICD9: 401.9] Akanksha OTOOLE

[facility]                       CPT-4: 95702              2014

 

                                        (77947) OFFICE/OUTPATIENT VISIT EST

Diagnosis: HYPERTENSION[ICD9: 401.9]

Diagnosis: MALAISE AND FATIGUE[ICD9: 780.79]

Diagnosis: SINUSITIS, ACUTE[ICD9: 461.9] [facility]            CPT-[facility]: 83528              2014

 

                                        (26854) OFFICE/OUTPATIENT VISIT EST

Diagnosis: HYPERLIPIDEMIA NEC/NOS[ICD9: 272.4]

Diagnosis: HYPERTENSION[ICD9: 401.9]

Diagnosis: B12 DEFIC ANEMIA NEC[ICD9: 281.1]

Diagnosis: HYPOGLYCEMIA[ICD9: 251.2] Akanksha OTOOLE

[facility]                       CPT-4: 40058              2014

 

                                        OFFICE/OUTPATIENT VISIT EST

Diagnosis: COUGH[ICD9: 786.2] Bertha DRIVER [facility] 

CPT-4: 57071                            2014

 

                                        OFFICE/OUTPATIENT VISIT EST

Diagnosis: COUGH[ICD9: 786.2]

Diagnosis: URI, ACUTE[ICD9: 465.9] Bertha SPENCER

[facility]                       CPT-4: 24152              10/15/2013

 

                                        (92443) OFFICE/OUTPATIENT VISIT EST

Diagnosis: HYPERTENSION[ICD9: 401.9]

Diagnosis: CEPHALGIA[ICD9: 784.0]

Diagnosis: ANXIETY STATE NOS[ICD9: 300.00]

Diagnosis: FLU VACCINE[ICD9: V04.81] [facility]                       [facility]: 78715              2013

 

                                        (54826) OFFICE/OUTPATIENT VISIT EST

Diagnosis: DIZZINESS/VERTIGO[ICD9: 780.4]

Diagnosis: SINUSITIS, ACUTE[ICD9: 461.9]

Diagnosis: Benign positional vertigo[ICD9: 386.11] Akanksha Xiangrodo KEVIN

FELECIATAMMY

SAramis [facility]         CPT-4: 64808              2013

 

                                        OFFICE/OUTPATIENT VISIT EST

Diagnosis: OTITIS MEDIA NOS[ICD9: 382.9] Akanksha Otoolendangela HIGHTOWERLINE SAramis

 

[facility]            CPT-4: 73882              2013

 

                                        OFFICE/OUTPATIENT VISIT EST

Diagnosis: Perforation of ear drum[ICD9: 384.20]

Diagnosis: SINUSITIS, ACUTE[ICD9: 461.9] Akanksha Otoolerodo        AKANKSHA SAramis

 

[facility]            CPT-4: 10106              05/10/2013

 

                                        (73538) OFFICE/OUTPATIENT VISIT EST

Diagnosis: Mastalgia[ICD9: 611.71] Akanksha Xiangrodo        AKANKSHA SAramis TJ KEY [facility]                       CPT-4: 35086              2013

 

                                        (29724) OFFICE/OUTPATIENT VISIT EST

Diagnosis: HYPERLIPIDEMIA NEC/NOS[ICD9: 272.4]

Diagnosis: HYPERTENSION[ICD9: 401.9]

Diagnosis: DIZZINESS/VERTIGO[ICD9: 780.4]

Diagnosis: Hyperglycemia[ICD9: 790.29]

Diagnosis: MALAISE AND FATIGUE[ICD9: 780.79] Akankshazina TEIXEIRA SAramis 

[facility]            CPT-4: 78345              2012

 

                                        (95216) OFFICE/OUTPATIENT VISIT EST

Diagnosis: EUSTACHIAN TUBE DYSFUNCTION[ICD9: 381.81]

Diagnosis: DIZZINESS/VERTIGO[ICD9: 780.4]

Diagnosis: ABDOMINAL PAIN[ICD9: 789.00]

Diagnosis: GERD[ICD9: 530.81]

Diagnosis: CERUMEN IMPACTION[ICD9: 380.4] Akanksha Xiangrodo BAUMAN S

Aramis 

[facility]            CPT-4: 26743              2012

 

                                        OFFICE/OUTPATIENT VISIT EST

Diagnosis: ABDOMINAL PAIN[ICD9: 789.00]

Diagnosis: DYSPEPSIA[ICD9: 536.8] Akanksha BAKER XIANGLORNA LANCE [facility]                       CPT-4: 74750              10/23/2012

 

                                        (72970) OFFICE/OUTPATIENT VISIT EST

Diagnosis: ABDOMINAL PAIN[ICD9: 789.00]

Diagnosis: DYSPEPSIA[ICD9: 536.8]

Diagnosis: IBS[ICD9: 564.1] Akanksha DRIVER [facility] CPT

-

4: 56420                                10/10/2012

 

                                        OFFICE/OUTPATIENT VISIT EST

Diagnosis: DIZZINESS/VERTIGO[ICD9: 780.4]

Diagnosis: HYPOGLYCEMIA[ICD9: 251.2] Akanksha MEJIA [facility]                       CPT-4: 48194              2012

 

                                        (23106) OFFICE/OUTPATIENT VISIT EST

Diagnosis: URINARY TRACT INFECTION[ICD9: 599.0] Akanksha DRIVER [facility]            CPT-4: 04982              2012

 

                                        (36854) OFFICE/OUTPATIENT VISIT EST

Diagnosis: HYPERLIPIDEMIA NEC/NOS[ICD9: 272.4]

Diagnosis: HYPERTENSION[ICD9: 401.9]

Diagnosis: MALAISE AND FATIGUE[ICD9: 780.79]

Diagnosis: DIZZINESS/VERTIGO[ICD9: 780.4] [facility]ha [facility]            CPT-4: 12896              2012

 

                                        (57315) OFFICE/OUTPATIENT VISIT EST

Diagnosis: DIZZINESS/VERTIGO[ICD9: 780.4]

Diagnosis: MALAISE AND FATIGUE[ICD9: 780.79]

Diagnosis: HYPERTENSION[ICD9: 401.9] Akanksha MEJIA [facility]                       CPT-4: 48802              2012

 

                                        OFFICE/OUTPATIENT VISIT EST

Diagnosis: LUMB/LUMBOSAC DISC DEGEN[ICD9: 722.52]

Diagnosis: Radiculopathy of leg[ICD9: 724.4]

Diagnosis: SACROILIITIS NEC[ICD9: 720.2]

Diagnosis: SPINAL ENTHESOPATHY[ICD9: 720.1] Akanksha DRIVER [facility]            CPT-4: 78284              2012

 

                                        (58171) OFFICE/OUTPATIENT VISIT EST

Diagnosis: PAIN, LOWER BACK[ICD9: 724.2]

Diagnosis: SPASM OF MUSCLE[ICD9: 728.85]

Diagnosis: SCIATICA[ICD9: 724.3]

Diagnosis: Lumbar degenerative disc disease[ICD9: 722.52] Akanksha DRIVER DO [facility] CPT-4: 81374              2012

 

                                        (79295) OFFICE/OUTPATIENT VISIT EST

Diagnosis: PAIN IN THORACIC SPINE[ICD9: 724.1]

Diagnosis: SPASM OF MUSCLE[ICD9: 728.85] Akanksha DRIVER DO [facility]            CPT-4: 78251              2012

 

                                        (85355) OFFICE/OUTPATIENT VISIT EST

Diagnosis: PAIN, LOWER BACK[ICD9: 724.2]

Diagnosis: SPASM OF MUSCLE[ICD9: 728.85]

Diagnosis: Sacroiliac dysfunction[ICD9: 739.4]

Diagnosis: Lumbar degenerative disc disease[ICD9: 722.52] Akanksha DRIVER DO [facility] CPT-4: 87141              2012

 

                                        OFFICE/OUTPATIENT VISIT EST

Diagnosis: MALAISE AND FATIGUE[ICD9: 780.79]

Diagnosis: HYPOTENSION[ICD9: 458.9]

Diagnosis: CAD[ICD9: 414.00] Akanksha DRIVER DO [facility] 

CPT-4: 22925                            2012

 

                                        OFFICE/OUTPATIENT VISIT EST

Diagnosis: SINUSITIS, ACUTE[ICD9: 461.9]

Diagnosis: PHARYNGITIS, ACUTE[ICD9: 462]

Diagnosis: CERUMEN IMPACTION[ICD9: 380.4] [facility]ha [facility]            CPT-4: 22064              2011

 

                                        OFFICE/OUTPATIENT VISIT EST

Diagnosis: PAIN IN THORACIC SPINE[ICD9: 724.1]

Diagnosis: GERD[ICD9: 530.81]

Diagnosis: DIZZINESS/VERTIGO[ICD9: 780.4] [facility]ha [facility]            CPT-4: 66537              2011

 

                OFFICE/OUTPATIENT VISIT EST Akanksha MEJIA DO [facility] CPT-

4: 20617                                2011

 

                    (97483) OFFICE/OUTPATIENT VISIT EST Akanksha DRIVER DO 

[facility]                       CPT-4: 79136              2011

 

                                        (84422) OFFICE/OUTPATIENT VISIT, EST

Diagnosis: PAIN, LOWER BACK[ICD9: 724.2] Akanksha BAUMAN SAramis

 

[facility]            CPT-4: 43156              2011

 

                    (90725) OFFICE/OUTPATIENT VISIT, EST Akanksha DE LA ROSA S. [facility]

[facility]                       CPT-4: 97462              2011

 

                    (41712) OFFICE/OUTPATIENT VISIT, EST Akanksha DE LA ROSA S. [facility]

[facility]                       CPT-4: 87552              2010

 

                    (07642) OFFICE/OUTPATIENT VISIT, EST Akanksha IZQUIERDOLINE S. [facility]

[facility]                       CPT-4: 19230              2010

 

                    (42966) OFFICE/OUTPATIENT VISIT, EST Akanksha DE LA ROSA S. [facility]

[facility]                       CPT-4: 98150              2010

 

                    (30292) OFFICE/OUTPATIENT VISIT, EST Akanksha DE LA ROSA S. [facility]

[facility]                       CPT-4: 47534              2010

 

                    (54202) OFFICE/OUTPATIENT VISIT, EST Akanksha DE LA ROSA S. [facility]

[facility]                       CPT-4: 00171              2010

 

                    (78319) OFFICE/OUTPATIENT VISIT, EST Akanksha DE LA ROSA S. [facility]

[facility]                       CPT-4: 12902              2010







Plan of Care





             Planned Activity Notes        Codes        Status       Date

 

                          Visit Diagnosis Plan: Essential (primary) hypertension

 Discussion: Improving 

with higher dose of metoprolol Recheck 2weeks

                                        ICD-9 : 401.9

ICD-10 : I10

                                                    2020

 

                          Visit Diagnosis Plan: Palpitations Discussion: Continu

e higher dose of 

metoprolol

                                        ICD-9 : 785.1

ICD-10 : R00.2

                                                    2020

 

                                        Appointment: Akanksha Driver

WPtel:[phone] [address] [id number]

              2020--moved up to 20 at 4pm (km)                 [place]

NCELED        2020

 

                          Visit Diagnosis Plan: Essential hypertension Discussio

n: Increase metoprolol to 

25mg q AM and 50mg po q pM Recheck 1 week

                                        ICD-9 : 401.9

ICD-10 : I10

                                                    2020

 

                          Visit Diagnosis Plan: Palpitations Discussion: Check C

BC, CMP, TSH

                                        ICD-9 : 785.1

ICD-10 : R00.2

                                                    2020

 

                                        Appointment: Akanksha Driver

WPtel:[phone]

                                        [address] [place] [address]                                              BP CHECK        2020

 

                                        Appointment: Akanksha Driver

WPtel:[phone]

                                        [address] [place] [address]                                              ACUTE ILLNESS   2020

 

                          Patient Education: metoprolol tartrate- [employer] [facility] [id number] 

https://www.[employer]/samplemd/resources/getResource/61/[email or web address]

                                        Completed           2020

 

                    Care Plan: X-RAY EXAM NECK SPINE 4/5VWS                     

LOINC : 03003-2

                          Pending                   2020

 

                    Care Plan: X-RAY EXAM THORAC SPINE4/>VW                     

LOINC : 64112-6

                          Pending                   2020

 

                                        Appointment: Akanksha Driver

WPtel:[phone]

                                        [address] [place] [address]                                              LAB             2019

 

                                        Appointment: Akanksha Driver

WPtel:[phone]

                                        [address] [place], [place] [id number]

[place]                                              INJECTION       10/22/2019

 

             Patient Education: INFLUENZA VACCINE [facility]                           

Completed    10/22/2019

 

                                        Appointment: Akanksha Driver

WPtel:[phone]

                                        [address] [place], [place] [id number]

[place]                                              LAB             10/11/2019

 

                          Visit Diagnosis Plan: Acute serous otitis media, left 

ear Discussion: instructed

to use flonase and claritin daily for symptom relief. discussed with patient 
that if no improvement in 2 weeks, will need to follow up with ENT for audiology
exam. instructed to call office with any other symptoms such as otalgia, 
dysphagia, fever, etc.

                                        ICD-9 : 381.01

ICD-10 : H65.02

                                                    2019

 

                                        Appointment: Nat Lozoya

                                        [address] [place], [place] [id number]

                                              ACUTE ILLNESS   2019

 

                          Visit Diagnosis Plan: Hypoglycemia, unspecified Discus

madeleine: Monitor BS At least 

6 small meals a day each with protein

                                        ICD-9 : 251.2

ICD-10 : E16.2

                                                    06/10/2019

 

                          Visit Diagnosis Plan: Essential (primary) hypertension

 Discussion: Stable Check 

CMP

                                        ICD-9 : 401.9

ICD-10 : I10

                                                    06/10/2019

 

                          Visit Diagnosis Plan: Other fatigue Discussion: Check 

CBC, TSH

                                        ICD-9 : 780.79

ICD-10 : R53.83

                                                    06/10/2019

 

                          Visit Diagnosis Plan: Urinary tract infection, site no

t specified Discussion: 

Started an old abx prescription

                                        ICD-9 : 599.0

ICD-10 : N39.0

                                                    06/10/2019

 

                                        Appointment: Akanksha Driver

WPtel:[phone]

                                        [address] [place], [place] [id number]

[place]                                              FOLLOW UP       06/10/2019

 

                          Visit Diagnosis Plan: Urinary tract infection, site no

t specified Discussion: 

Macrobid 100mg po BID for 1 week

                                        ICD-9 : 599.0

ICD-10 : N39.0

                                                    2019

 

                          Visit Diagnosis Plan: Gastro-esophageal reflux disease

 without esophagitis 

Discussion: Stable on omeprazole

Follow Up: 3 months

                                        ICD-9 : 530.81

ICD-10 : K21.9

                                                    2019

 

                          Visit Diagnosis Plan: Essential (primary) hypertension

 Discussion: Stable Sees 

Dr. Clements this month

                                        ICD-9 : 401.9

ICD-10 : I10

                                                    2019

 

                                        Appointment: Akanksha Driver

WPtel:[phone]

                                        [address] [place], [place] [address]

[place]                                              FOLLOW UP       2019

 

                          Patient Education: metoprolol tartrate- [facility] [id number] 

[email or web address]://[email or web address]/samplemd/resources/getResource/61/[email or web address]-[email or web address]

                                        Completed           2019

 

                                        Appointment: Akanksha Driver

WPtel:[phone]

                                        [address] [place], [place] [address]

[place]                                              CANCELED        02/15/2019

 

                          Visit Diagnosis Plan: Gastro-esophageal reflux disease

 without esophagitis 

Discussion: Continue protonix and carafate Proceed with updated EGD

                                        ICD-9 : 530.81

ICD-10 : K21.9

                                                    2018

 

                          Visit Diagnosis Plan: Epigastric pain Discussion: [facility] CT abdomen/pelvis due to

ongoing abdominal pain

                                        ICD-9 : 789.06

ICD-10 : R10.13

                                                    2018

 

                                        Appointment: Akanksha Driver

WPtel:[phone]

                                        [facility]

[place]                                              FOLLOW UP       2018

 

                    Care Plan: CT PELVIS W/O DYE                     LOINC : 361

08-9

                          Pending                   2018

 

                    Care Plan: CT ABDOMEN W/O DYE                     LOINC : 36

103-0

                          Pending                   2018

 

                    Care Plan: Referral Order                     SNOMED-CT : 30

[id number]

                          Pending                   2018

 

                                        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

art disease of native coronary artery 

without angina pectoris                 Discussion: S/P cardiac cath--doing medi

vicki management 

with imdur and metoprolol increased Has fwup with cardiology next week Discussed
cardiac rehab

                                        ICD-9 : 414.00

ICD-10 : I25.10

                                                    2018

 

                          Visit Diagnosis Plan: Gastro-esophageal reflux disease

 without esophagitis 

Discussion: Continue protonix at BID dosing and carafate BID

Follow Up: 2 months

                                        ICD-9 : 530.81

ICD-10 : K21.9

                                                    2018

 

                          Visit Diagnosis Plan: Essential (primary) hypertension

 Discussion: Stable

                                        ICD-9 : 401.9

ICD-10 : I10

                                                    2018

 

                          Visit Diagnosis Plan: Generalized anxiety disorder Dis

cussion: Stable

                                        ICD-9 : 300.00

ICD-10 : F41.1

                                                    2018

 

                                        Appointment: Akanksha Driver

WPtel:[phone]

                                        [facility]

[place]                                              FOLLOW UP       2018

 

                          Visit Diagnosis Plan: Gastro-esophageal reflux disease

 without esophagitis 

Discussion: Continue protonix at BID dosing Continue carafate at q AC and HS 
dosing for 2 more weeks then decrease to BID dosing

Follow Up: 4 weeks

                                        ICD-9 : 530.81

ICD-10 : K21.9

                                                    10/02/2018

 

                          Visit Diagnosis Plan: Generalized anxiety disorder Dis

cussion: Increase xanax to

BID dosing especially with upcoming cardiac testing which is already making 
patient more anxious and worried

                                        ICD-9 : 300.00

ICD-10 : F41.1

                                                    10/02/2018

 

                          Visit Diagnosis Plan: Palpitations Discussion: Bobby

carltonchristian going to schedule 

Lexiscan

                                        ICD-9 : 785.1

ICD-10 : R00.2

                                                    10/02/2018

 

                          Visit Diagnosis Plan: Urinary tract infection, site no

t specified Discussion: 

Reculture urine

                                        ICD-9 : 599.0

ICD-10 : N39.0

                                                    10/02/2018

 

                                        Appointment: Akanksha Driver

WPtel:[phone]

                                        [address] [place] [address]                                              FOLLOW UP       10/02/2018

 

             Patient Education: Patient Medication Summary                      

     Completed    10/02/2018

 

                                        Appointment: Akanksha Driver

WPtel:[phone]

                                        [address] [place] [address]                                              LAB             2018

 

             Patient Education: Patient Medication Summary                      

     Completed    2018

 

                          Visit Diagnosis Plan: Generalized anxiety disorder Dis

cussion: Did discuss 

increased risk of benzodiazepines causing dementia but at this time will stay at
xanax 0.25mg po BID

                                        ICD-9 : 300.00

ICD-10 : F41.1

                                                    2018

 

                          Visit Diagnosis Plan: Epigastric pain Discussion: Cont

inue protonix and carafate

but make into a slurry Flu shot given Discussed EGD if symptoms persist

Follow Up: 2 weeks

                                        ICD-9 : 789.06

ICD-10 : R10.13

                                                    2018

 

                                        Appointment: Akanksha Driver

WPtel:[phone]

                                        [address] [place] [address]                                              FOLLOW UP       2018

 

             Patient Education: Patient Medication Summary                      

     Completed    2018

 

                          Visit Diagnosis Plan: Essential (primary) hypertension

 Discussion: Has 

hydralazine to use prn per cardiology Going to do 30 day holter monitor

                                        ICD-9 : 401.9

ICD-10 : I10

                                                    2018

 

                          Visit Diagnosis Plan: Hypoglycemia, unspecified Discus

madeleine: 6 small meals a 

day--each with protein Increase fluid intake

                                        ICD-9 : 251.2

ICD-10 : E16.2

                                                    2018

 

                          Visit Diagnosis Plan: Gastro-esophageal reflux disease

 without esophagitis 

Discussion: Change to protonix 40mg po BID

Follow Up: 1 months

                                        ICD-9 : 530.81

ICD-10 : K21.9

                                                    2018

 

                                        Appointment: Akanksha Driver

WPtel:[phone] [address]                                              ACUTE ILLNESS   2018

 

             Patient Education: Patient Medication Summary                      

     Completed    2018

 

                          Visit Diagnosis Plan: Dizziness and giddiness Discussi

on: blood work to be 

completed in office including cmp, cbc, troponin to rule out glucose 
intolerance, infection, dehydration. instructed patient that she needs to see 
her cardiologist sooner than oct and patient requested we contact dr. clements's 
office. will have front office make appt. patient has carotid doppler annually.

                                        ICD-9 : 780.4

ICD-10 : R42

                                                    2018

 

                                        Appointment: Nat Lozoya

                                        [address] [place] [address]                                              ACUTE ILLNESS   2018

 

             Patient Education: Patient Medication Summary                      

     Completed    2018

 

                          Visit Diagnosis Plan: Cervicalgia Discussion: Complete

 course of PT since 

symptoms improved Continue stretching exercises Notify if symptoms return or 
worsen

                                        ICD-9 : 723.1

ICD-10 : M54.2

                                                    2018

 

                                        Appointment: Akanksha Driver

WPtel:[phone]

                                        [facility] [address]                                              FOLLOW UP       2018

 

             Patient Education: Patient Medication Summary                      

     Completed    2018

 

                          Visit Diagnosis Plan: Vertigo of central origin, bilat

eral Discussion: Discussed

PT for vestibular therapy

                                        ICD-9 : 386.2

ICD-10 : H81.43

                                                    2018

 

                          Visit Diagnosis Plan: Other spondylosis with radiculop

athy, cervical region 

Discussion: Check MRI of cervical spine

                                        ICD-9 : 721.0

ICD-10 : M47.22

                                                    2018

 

                          Visit Diagnosis Plan: Hypoglycemia, unspecified Discus

madeleine: Eat something with 

protein every 2 hrs

                                        ICD-9 : 251.2

ICD-10 : E16.2

                                                    2018

 

                                        Appointment: Akanksha Driver

WPtel:[phone] [address] [facility]

                                                              2018

 

             Patient Education: Patient Medication Summary                      

     Completed    2018

 

                    Care Plan: MRI NECK SPINE W/O DYE                     [facility] 

: [id number]

                          Pending                   2018

 

                          Visit Diagnosis Plan: Benign paroxysmal vertigo, bilat

eral Discussion: patient 

has meclizine at home but states it makes her too tired. instructed patient to 
cut in half and take at night. if it doesn't cause too much drowsiness, 
instructed to take bid until feeling better. instructed to rtc wed if no 
improvement or worsening symptoms. instructed patient to increase fluid intake 
as well.

                                        ICD-9 : 386.11

ICD-10 : H81.13

                                                    2018

 

                          Visit Diagnosis Plan: Otalgia, bilateral Discussion: k

enalog 40 mg given to 

patient im. instructed patient to monitor blood sugar at home due to risk of 
increased sugar. instructed patient to rtc on wednesday if no improvement and 
may require antibiotic.

                                        ICD-9 : 388.70

ICD-10 : H92.03

                                                    2018

 

                                        Appointment: Nat Lozoya

                                        [address] [place] [address]                                              ACUTE ILLNESS   2018

 

             Patient Education: Patient Medication Summary                      

     Completed    2018

 

                          Visit Diagnosis Plan: Low back pain Discussion: Stretc

hes Topical aspercreme 

Tylenol 1 po TID

                                        ICD-9 : 724.2

ICD-10 : M54.5

                                                    2018

 

                          Visit Diagnosis Plan: Radiculopathy, lumbosacral regio

n Discussion: As above

                                        ICD-9 : 724.4

ICD-10 : M54.17

                                                    2018

 

                          Visit Diagnosis Plan: Left lower quadrant pain Discuss

ion: Culture urine Notify 

if worsens

                                        ICD-9 : 789.04

ICD-10 : R10.32

                                                    2018

 

                                        Appointment: Akanksha Driver

WPtel:[phone]

                                        [address] [place] [address]                                              ACUTE ILLNESS   2018

 

             Patient Education: Patient Medication Summary                      

     Completed    2018

 

                                        Appointment: Akanksha Driver

WPtel:[phone]

                                        [address] [place] [address]

[place]                                              INJECTION       10/06/2017

 

             Patient Education: Patient Medication Summary                      

     Completed    10/06/2017

 

                                        Appointment: Akanksha Driver

WPtel:[phone]

                                        [address] [place], [place] [address]

[place]                                              LAB             2017

 

             Patient Education: Patient Medication Summary                      

     Completed    2017

 

                          Visit Diagnosis Plan: Other intervertebral disc degene

ration, lumbar region 

Follow Up: 3 months

                                        ICD-9 : 722.52

ICD-10 : M51.36

                                                    2017

 

                          Visit Diagnosis Plan: Pain in thoracic spine Discussio

n: PT has helped Continue 

stretches and walking [facility]ed [facility]

                                        ICD-9 : 724.1

ICD-10 : M54.6

                                                    2017

 

                                        Appointment: Akanksha Driver

WPtel:[phone]

                                        [address] [place], [place] [address]

                                              FOLLOW UP       2017

 

             Patient Education: Patient Medication Summary                      

     Completed    2017

 

                          Visit Diagnosis Plan: Left lower quadrant pain Discuss

ion: Had CT scan of 

abdomen/pelvis in 2017 and normal colonoscopy in 2015

                                        ICD-9 : 789.04

ICD-10 : R10.32

                                                    2017

 

                          Visit Diagnosis Plan: Low back pain Discussion: Start 

PT for low back- Seems 

like abdominal pain is radiating from low back

Follow Up: 5 weeks

                                        ICD-9 : 724.2

ICD-10 : M54.5

                                                    2017

 

                                        Appointment: Akanksha Driver

WPtel:[phone]

                                        [address] [place], [place] [address]

                                              ACUTE ILLNESS   2017

 

             Patient Education: Patient Medication Summary                      

     Completed    2017

 

                                        Appointment: Akanksha Driver

WPtel:[phone]

                                        [address] [place], [place] [address]

[place]                                              LAB             2017

 

             Patient Education: Patient Medication Summary                      

     Completed    2017

 

                          Visit Diagnosis Plan: Mixed hyperlipidemia Discussion:

 Check lipids

                                        ICD-9 : 272.4

ICD-10 : E78.2

                                                    2017

 

                          Visit Diagnosis Plan: Mastodynia Discussion: Check Jace

ateral diagnostic 

mammogram with US

                                        ICD-9 : 611.71

ICD-10 : N64.4

                                                    2017

 

                          Visit Diagnosis Plan: Impaired fasting glucose Discuss

ion: Return in AM for CMP,

HbA1C Accuchecks daily Diet/Exercise discussed at length

                                        ICD-9 : 790.29

ICD-10 : R73.01

                                                    2017

 

                          Visit Diagnosis Plan: Other fatigue Discussion: Check 

CBC, TSH

                                        ICD-9 : 780.79

ICD-10 : R53.83

                                                    2017

 

                                        Appointment: Akanksha Drivertel:[phone]

                                        [address] [place] [address]              3/ confirmed `sl                 ACUTE ILLNESS   2017

 

             Patient Education: Patient Medication Summary                      

     Completed    2017

 

                    Care Plan: MAMMOGRAM SCREENING                     LOINC : 2

[id number]

                          Pending                   2017

 

                          Visit Diagnosis Plan: Acute upper respiratory infectio

n, unspecified Discussion:

Exam is reassuring Likely viral illness Supportive care reviewed and encouraged 
Follow up PRN

                                        ICD-9 : 465.9

ICD-10 : J06.9

                                                    2017

 

                                        Appointment: Luba Stevenson 

                                        [address] [place] [address]                                              ACUTE ILLNESS   2017

 

             Patient Education: Patient Medication Summary                      

     Completed    2017

 

                          Visit Plan:               Supportive care. Rest, Fluid

s, Tylenol/Motrin prn fever or 

bodyaches. Notify if worsening symptoms.

                                                            2016

 

                                        Appointment: Akanksha Drivertel:[phone]

                                        [address] [place] [address]                                              ACUTE ILLNESS   2016

 

             Patient Education: Patient Medication Summary                      

     Completed    2016

 

                                        Appointment: Akanksha Driver

WPtel:[phone]

                                        [address] [place] [address]

[place]                                              INJECTION       10/07/2016

 

             Patient Education: Patient Medication Summary                      

     Completed    10/07/2016

 

                                        Appointment: Akanksha Driver

WPtel:[phone]

                                        [address] [place] [address]                                              LAB             2016

 

             Patient Education: Patient Medication Summary                      

     Completed    2016

 

                          Visit Plan:               Add miralax daily Use daily 

probiotic and metamucil and push fluids

Notify if worsens/persists

                                                            2016

 

                                        Appointment: Akanksha Driver

WPtel:[phone]

                                        [address] [facility]

              16 confirmed ~sl                 ACUTE ILLNESS   2016

 

             Patient Education: Patient Medication Summary                      

     Completed    2016

 

                          Visit Plan:               No NSAIDs Kidney function di

scussed Discussed hydration Monitor lab

every 4months so will check CMP, HbA1C next month

                                                            2016

 

                                        Appointment: Akanksha Driver

WPtel:[phone]

                                        [address] [place] [address]              03/15 confirmed ~sl                 ACUTE ILLNESS   2016

 

             Patient Education: Patient Medication Summary                      

     Completed    2016

 

                          Visit Plan:               Vestibular exercises Refill 

flonase Strict low Na diet--discussed 

that needs to read labels Rx for walker with chair given due to muscle 
weakness/unsteadiness Has carotids and abdominal aorta and legs checked in 
January Check Chem 7

                                                            2015

 

                                        Appointment: Akanksha Driver

WPtel:[phone]

                                        [address] [place] [address]              12/15/15 appt confirmed cn                 FOLLOW UP       

 

             Patient Education: Patient Medication Summary                      

     Completed    2015

 

             Patient Education: Vertigo                           Completed    1

2015

 

                                        Appointment: Akanksha Driver

WPtel:[phone]

                                        [address] [place], [place] [address]

[place]                                              INJECTION       10/16/2015

 

             Patient Education: Patient Medication Summary                      

     Completed    10/16/2015

 

                                        Appointment: Akanksha Driver

WPtel:[phone]

                                        [address] [place], [place] [address]

[place]                                              UA              09/15/2015

 

             Patient Education: Patient Medication Summary                      

     Completed    09/15/2015

 

                                        Referral: Landon De La Torre

WPtel:[phone]

#1 [facility] ROSA

[id number]

US              Referral                        Completed       2015

 

                                        Appointment: Akanksha Driver

WPtel:[phone]

                                        [address] [place], [place] [id number]

US                                              LAB             09/10/2015

 

             Patient Education: Patient Medication Summary                      

     Completed    09/10/2015

 

                          Visit Plan:               Check CMP, CBC, TSH, Free T4

, B12, Lipids, HbA1C Discussed 6 small 

meals a day each with protein Would likely benefit from antidepressant Update 
colonoscopy

                                                            2015

 

                                        Appointment: Akanksha Driver

WPtel:[phone]

                                        [address] [place], [place] [id number]              9/8/15 confirmed                 ACUTE ILLNESS   2015

 

             Patient Education: Patient Medication Summary                      

     Completed    2015

 

                          Visit Plan:               Cerumen flush with warm wate

r and peroxide - good results Resume 

Nasonex nasal spray daily Recommended Debrox earwax removal drops

                                                            2015

 

                                        Appointment: Bertha Mon

WPtel:[phone]

                                        [address] [place] [address]                                              ACUTE ILLNESS   2015

 

             Patient Education: Patient Medication Summary                      

     Completed    2015

 

                          Visit Plan:               Continue aspirin daily Add m

eloxicam for 1week Elevate and Ice Call

on 2015

 

                                        Appointment: Akanksha Driver

WPtel:[phone]

                                        [address] [place] [address]                                              ACUTE ILLNESS   2015

 

             Patient Education: Patient Medication Summary                      

     Completed    2015

 

                          Visit Plan:               Been using baclofen at [facility] and has helped some PT for next 

2-4weeks

                                                            2015

 

                                        Appointment: Akanksha Driver

WPtel:[phone]

                                        [address] [place], [place] [address]                                              Hospital Follow Up 2015

 

             Patient Education: Patient Medication Summary                      

     Completed    2015

 

                                        Appointment: Akanksha Driver

WPtel:[phone]

                                        [address] [place], [place] [id number]                                              LAB             2015

 

                                        Appointment: Akanksha Driver

WPtel:[phone]

                                        [address] [place] [address]              feeling better, weather -                 FOLLOW UP       

 

                                        Referral: Landon De La Torre

WPtel:[phone]

#1 [facility]              Referral                        Appointment Requested 2015

 

                          Visit Plan:               Continue exercise and curren

t meds See surgery for removal of right

arm lesion

                                                            2015

 

                                        Appointment: Akanksha Driver

WPtel:[phone]

                                        [address] [place] [address]                                              FOLLOW UP       2015

 

             Patient Education: Patient Medication Summary                      

     Completed    2015

 

                                        Appointment: Akanksha Driver

WPtel:[phone]

                                        [address] [place], [place] [address]

US                                              INJECTION       10/17/2014

 

             Patient Education: Patient Medication Summary                      

     Completed    10/17/2014

 

                                        Appointment: Bertha Mon

WPtel:[phone]

                                        [address] [place] [address]                                              ACUTE ILLNESS   2014

 

             Patient Education: Patient Medication Summary                      

     Completed    2014

 

                          Visit Plan:               Discussed that likely lumbar

 etiology for leg weakness Will change 

amlodopine to low dose dyazide and see if helps legs and inner ear

                                                            2014

 

                                        Appointment: Akanksha Driver

WPtel:[phone]

                                        [address] [place] [address]                                              FOLLOW UP       2014

 

             Patient Education: Patient Medication Summary                      

     Completed    2014

 

                          Visit Plan:               Ceftin and continue claritin

/flonase Decrease amlodopine to 2.5mg q

HS until fwup with Card due to weakness

                                                            2014

 

                                        Appointment: Akanksha Driver

WPtel:[phone]

                                        [address] [place] [address]                                              ACUTE ILLNESS   2014

 

             Patient Education: Patient Medication Summary                      

     Completed    2014

 

                                        Appointment: Akanksha Driver

WPtel:[phone]

                                        [address] [place], [place] [id number]                                              LAB             2014

 

             Patient Education: Patient Medication Summary                      

     Completed    2014

 

                                        Appointment: Bertha Mon

WPtel:[phone]

                                        [address] [place], [place] [id number]                                              ACUTE ILLNESS   2014

 

             Patient Education: Patient Medication Summary                      

     Completed    2014

 

                          Visit Plan:               Supportive care. Rest, Fluid

s, Tylenol prn fever or bodyaches. 

Notify if worsening symptoms. Ceftin

                                                            10/15/2013

 

                                        Appointment: Bertha Mon

WPtel:[phone]

                                        [address] [place] [address]                                              ACUTE ILLNESS   10/15/2013

 

             Patient Education: Patient Medication Summary                      

     Completed    10/15/2013

 

                                        Appointment: Akanksha Driver

WPtel:[phone]

                                        [address] [place] [address]                                              BP CHECK        2013

 

             Patient Education: Patient Medication Summary                      

     Completed    2013

 

                                        Appointment: Akanksha Driver

WPtel:[phone]

                                        [address] [place] [address]                                              ER Follow UP    2013

 

             Patient Education: Patient Medication Summary                      

     Completed    2013

 

                          Visit Plan:               Restart flonase BID Use mecl

izine 25mg q HS Vestibular exercises 

Claritin 10mg q AM See ENT if doesn't resolve

                                                            2013

 

                                        Appointment: Akanksha Driver

WPtel:[phone]

                                        [address] [place] [address]                                              ACUTE ILLNESS   2013

 

             Patient Education: Patient Medication Summary                      

     Completed    2013

 

                          Visit Plan:               Will continue to observe

                                                            2013

 

                                        Appointment: Akanksha Driver

WPtel:[phone]

                                        [address] [place] [address]                                              FOLLOW UP       2013

 

             Patient Education: Patient Medication Summary                      

     Completed    2013

 

                          Visit Plan:               ERx for Augmentin 875mg q12 

x 10 days and Floxin otic drops 5 gtts 

AD x7days Sample of Nasonex per pt request Discussed treatment (nasal saline, 
salt water gargles, keeping right ear clean and dry, for worsening go to UC on 
weekend, Tylenol/ibuprofen, etc.) RTC 2 weeks for ear recheck.

                                                            05/10/2013

 

                                        Appointment: Jill Jean 

WPtel:[phone]

                                        [address] [place] [address]                                              ACUTE ILLNESS   05/10/2013

 

             Patient Education: Patient Medication Summary                      

     Completed    05/10/2013

 

                          Visit Plan:               Decrease caffeine intake Marina

ck Bilateral Mammogram with US of left 

breast

                                                            2013

 

                                        Appointment: Akanksha Driver

WPtel:[phone]

                                        [address] [place] [address]                                              ACUTE ILLNESS   2013

 

             Patient Education: Patient Medication Summary                      

     Completed    2013

 

                                        Appointment: Kate Hall

WPtel:[phone]

                                        [address] [place] [address]              appt scheduled                  ACUTE ILLNESS   2013

 

                                        Appointment: Akanksha Driver

WPtel:[phone]

                                        [address] [place], [place] [address]                                              LAB             2012

 

             Patient Education: Patient Medication Summary                      

     Completed    2012

 

                          Visit Plan:               Continue off carafate and se

e how does Continue probiotic Add 

Vestibular exercises and use meclizine prn Continue Nasonex Check fasting lab 
including CMP, Lipids, CBC, TSH, Free T4, HbA1C

                                                            2012

 

                                        Appointment: Akanksha Driver

WPtel:[phone]

                                        [address] [place] [address]                                              FOLLOW UP       2012

 

             Patient Education: Patient Medication Summary                      

     Completed    2012

 

                          Visit Plan:               Continue carafate for 4more 

weeks at current dose then decrease to 

BID for 2wks then q HS

                                                            10/23/2012

 

                                        Appointment: Akanksha Driver

WPtel:[phone]

                                        [address] [place], [place] [id number]                                              FOLLOW UP       10/23/2012

 

             Patient Education: Patient Medication Summary                      

     Completed    10/23/2012

 

                          Visit Plan:               Continue omeprazole Add Ellyn

fate 1gm po q AC Add daily probiotic

                                                            10/10/2012

 

                                        Appointment: Akanksha Driver

WPtel:[phone]

                                        [address] [place] [address]                                              ACUTE ILLNESS   10/10/2012

 

             Patient Education: Patient Medication Summary                      

     Completed    10/10/2012

 

                                        Appointment: Akanksha Driver

WPtel:[phone]

                                        [address] [place], [place] [id number]

US                                              INJECTION       2012

 

             Patient Education: Patient Medication Summary                      

     Completed    2012

 

                          Visit Plan:               Diabetic Diet Accuchecks BID

 alternating times Hold onglyza and 

Januvia for now and continue diet and exercise and weight loss and drew LOREDO 
Discussed hypoglycemia and snack of peanut butter and crackers with juice or 
milk

                                                            2012

 

                                        Appointment: Akanksha Drivertel:[phone]

                                        [address] [place] [address]                                              WORK IN         2012

 

             Patient Education: Patient Medication Summary                      

     Completed    2012

 

                                        Appointment: Akanksha Driver

WPtel:[phone]

                                        [address] [place] [address]              2012

 

             Patient Education: Patient Medication Summary                      

     Completed    2012

 

                                        Appointment: Akanksha Driver

WPtel:[phone]

                                        [address] [place] [address]                                              LAB             2012

 

             Patient Education: Patient Medication Summary                      

     Completed    2012

 

                                        Appointment: Akanksha Driver

WPtel:[phone]

                                        [address] [place] [address]                                              ACUTE ILLNESS   2012

 

             Patient Education: Patient Medication Summary                      

     Completed    2012

 

                          Visit Plan:               Injection to Right SI joint 

as above Pt will call in 3 days on pain

Has PT starting in 2wks.

                                                            2012

 

                                        Appointment: Akanksha Driver

WPtel:[phone]

                                        [address] [place] [address]                                              ACUTE ILLNESS   2012

 

             Patient Education: Patient Medication Summary                      

     Completed    2012

 

                          Visit Plan:               OMT done Start PT May need u

pdated MRI if need to consider epidural

Prednisone

                                                            2012

 

                                        Appointment: Akanksha Driver

WPtel:[phone]

                                        [address] [place] [address]                                              OMT             2012

 

             Patient Education: Patient Medication Summary                      

     Completed    2012

 

                          Visit Plan:               Flexeril and Vimovo OMT done

 Daily stretches

                                                            2012

 

                                        Appointment: Akanksha Driver

WPtel:[phone]

                                        [address] [place], [place] [id number]

                                              ACUTE ILLNESS   2012

 

             Patient Education: Patient Medication Summary                      

     Completed    2012

 

                          Visit Plan:               OMT done Daily stretches Vinod

st heat or biofreeze Right SI joint 

injection Call in 1week Vimovo BID

                                                            2012

 

                                        Appointment: Akanksha Driver

WPtel:[phone]

                                        [address] [place], [place] [address]                                              ACUTE ILLNESS   2012

 

             Patient Education: Patient Medication Summary                      

     Completed    2012

 

                                        Appointment: Akanksha Driver

WPtel:[phone]

                                        [address] [place], [place] [address]

US                                              LAB             2012

 

             Patient Education: Patient Medication Summary                      

     Completed    2012

 

                          Visit Plan:               Decrease amlodopine to 1.25m

g daily Schedule with Cardiology 

Fasting lab in AM

                                                            2012

 

                                        Appointment: Akanksha Driver

WPtel:[phone]

                                        [address] [place] [address]                                              ACUTE ILLNESS   2012

 

             Patient Education: Patient Medication Summary                      

     Completed    2012

 

                          Visit Plan:               Saline nasal flushes prn. Ty

lenol/Motrin prn headache. Notify if 

persists/symptoms worsening. Cerumen removal from ears as above

                                                            2011

 

                                        Appointment: Akanksha Driver

WPtel:[phone]

                                        [address] [place], [place] [id number]

                                              ACUTE ILLNESS   2011

 

             Patient Education: Patient Medication Summary                      

     Completed    2011

 

                                        Appointment: Akanksha Driver

WPtel:[phone]

                                        [address] [place], [place] [address]

US                                              INJECTION       10/05/2011

 

             Patient Education: Patient Medication Summary                      

     Completed    10/05/2011

 

                          Visit Plan:               Continue vimovo for 1more we

ek Increase Omeprazole to BID for 1mo 

then resume QD if stomach improved Vestibular exercises with meclizine q HS for 
next week

                                                            2011

 

                                        Appointment: Akanksha Driver

WPtel:[phone]

                                        [address] [place] [address]                                              FOLLOW UP       2011

 

             Patient Education: Patient Medication Summary                      

     Completed    2011

 

                                        Appointment: Akanksha Driver

WPtel:[phone]

                                        [address] [place] [address]                                              ACUTE ILLNESS   2011

 

             Patient Education: Patient Medication Summary                      

     Completed    2011

 

                                        Appointment: Akanksha Driver

WPtel:[phone]

                                        [address] [place] [address]                                              LAB             2011

 

             Patient Education: Patient Medication Summary                      

     Completed    2011

 

                          Visit Plan:               Saline nasal flushes prn. Ty

lenol/Motrin prn headache. Notify if 

persists/symptoms worsening. Add Veramyst Increase Omeprazole to 20mg po BID

                                                            2011

 

                                        Appointment: Akanksha Driver

WPtel:[phone]

                                        [address] [place] [address]                                              ACUTE ILLNESS   2011

 

             Patient Education: Patient Medication Summary                      

     Completed    2011

 

                                        Appointment: Akanksha Driver

WPtel:[phone]

                                        [address] [place] [address]                                              FOLLOW UP       2011

 

             Patient Education: Patient Medication Summary                      

     Completed    2011

 

                                        Appointment: Akanksha Driver

WPtel:[phone]

                                        [address] [place] [address]                                              ACUTE ILLNESS   2011

 

             Patient Education: Patient Medication Summary                      

     Completed    2011

 

                          Visit Plan:               Nasocourt sample given. Pt. 

will notify if symptoms are worse on 

Monday.

                                                            2010

 

                                        Appointment: Kate Hall

WPtel:[phone]

                                        [address] [place] [address]                                              ACUTE ILLNESS   2010

 

             Patient Education: Patient Medication Summary                      

     Completed    2010

 

                                        Appointment: Akanksha Driver

WPtel:[phone]

                                        [address] [place], [place] [id number]

US                                              INJECTION       10/06/2010

 

             Patient Education: Patient Medication Summary                      

     Completed    10/06/2010

 

                                        Appointment: Akanksha Driver

WPtel:[phone]

                                        [address] [place] [address]                                              FOLLOW UP       2010

 

             Patient Education: Patient Medication Summary                      

     Completed    2010

 

             Patient Education: Lexapro                           Completed    0

2010

 

                          Visit Plan:               May proceed with hiatal mykel

ia repair per Card. so will contact Dr. Cash's office to proceed with surgery Trial of Lexapro 5mg QD plus use 
xanax prn

                                                            2010

 

                                        Appointment: Akanksha Driver

WPtel:[phone]

                                        [address] [place] [address]                                              FOLLOW UP       2010

 

             Patient Education: Patient Medication Summary                      

     Completed    2010

 

                          Visit Plan:               B12 given Cont oral B12 and 

iron Fwup 1mo for B12 Proceed with 

hiatal hernia repair once card clearance

                                                            2010

 

                                        Appointment: Akanksha Driver

WPtel:[phone]

                                        [address] [place] [address]                                              FOLLOW UP       2010

 

             Patient Education: Patient Medication Summary                      

     Completed    2010

 

                                        Appointment: Akanksha Driver

WPtel:[phone]

                                        [address] [place] [address]                                              FOLLOW UP       2010

 

             Patient Education: Patient Medication Summary                      

     Completed    2010

 

                                        Appointment: Akanksha Driver

WPtel:[phone]

                                        [address] [place] [address]

[place]                                              LAB             2010

 

             Patient Education: Patient Medication Summary                      

     Completed    2010

 

                          Visit Plan:               Check fasting lab in AM--CMP

,Lipids, CBC, Vit D, TSH,FreeT4, B12

                                                            2010

 

                                        Appointment: Akanksha Driver

WPtel:[phone]

                                        [address] [place] [address]

[place]                                              FOLLOW UP       2010

 

             Patient Education: Patient Medication Summary                      

     Completed    2010

 

                                        Referral: Landon De La Torre

WPtel:[phone]

[address] [place] [id number]

[place]              Referral                        Appointment Requested  

 

                                        Referral: Landon De La Torre

WPtel:[phone]

[facility]

[id number]

[place]              Referral                        Initiated        







Instructions





                                        Comment

 

                                        . Supportive care.  Rest, Fluids, Tyleno

l/Motrin prn fever or bodyaches.  Notify

if worsening symptoms.



 

                                        . Add miralax daily

Use daily probiotic and metamucil and push fluids

Notify if worsens/persists

 

                                        . No NSAIDs

Kidney function discussed

Discussed hydration 

Monitor lab every 4months so will check CMP, HbA1C next month

 

                                        . Vestibular exercises

Refill flonase

Strict low Na diet--discussed that needs to read labels

Rx for walker with chair given due to muscle weakness/unsteadiness

Has carotids and abdominal aorta and legs checked in January

Check Chem 7



 

                                        . Check CMP, CBC, TSH, Free T4, B12, Lip

ids, HbA1C

Discussed 6 small meals a day each with protein

Would likely benefit from antidepressant 

Update colonoscopy

 

                                        . Cerumen flush with warm water and tyler

xide - good results 

Resume Nasonex nasal spray daily

Recommended Debrox earwax removal drops 

 

                                        . Continue aspirin daily

Add meloxicam for 1week

Elevate and Ice

Call on Monday

 

                                        . Been using baclofen at bedtime and has

 helped some

PT for next 2-4weeks



 

                                        . Continue exercise and current meds

See surgery for removal of right arm lesion

 

                                        . Discussed that likely lumbar etiology 

for leg weakness

Will change amlodopine to low dose dyazide and see if helps legs and inner ear

 

                                        . Ceftin and continue claritin/flonase

Decrease amlodopine to 2.5mg q HS until fwup with Card due to weakness

 

                                        .  Supportive care.  Rest, Fluids, Tylen

ol prn fever or bodyaches.  Notify if 

worsening symptoms.

Ceftin

 

                                        . Restart flonase BID

Use meclizine 25mg q HS

Vestibular exercises

Claritin 10mg q AM

See ENT if doesn't resolve

 

                                        . Will continue to observe



 

                                        . ERx for Augmentin 875mg q12 x 10 days 

and Floxin otic drops 5 gtts AD x7days

Sample of Nasonex per pt request

Discussed treatment (nasal saline, salt water gargles, keeping right ear clean 
and dry, for worsening go to [facility] on weekend, Tylenol/ibuprofen, etc.)

RTC 2 weeks for ear recheck.

 

                                        . Decrease caffeine intake

Check Bilateral Mammogram with US of left breast

 

                                        Left ear flushed with warm water and per

oxide with ear syringe and then last 

removed with currette--peroxide drops instilled.  Tolerated well, no 
complications, TM intact.. Continue off carafate and see how does

Continue probiotic

Add Vestibular exercises and use meclizine prn

Continue Nasonex

Check fasting lab including CMP, Lipids, CBC, TSH, Free T4, HbA1C

 

                                        . Continue carafate for 4more weeks at c

urrent dose then decrease to BID for 

2wks then q HS

 

                                        . Continue omeprazole

Add Carafate 1gm po q AC

Add daily probiotic



 

                                        . Diabetic Diet

Accuchecks BID alternating times

Hold onglyza and Januvia for now and continue diet and exercise and weight loss 
and moniter BS

Discussed hypoglycemia and snack of peanut butter and crackers with juice or 
milk

 

                                        Informed Consent obtainded.  Right SI yasir

int cleansed with alcohol and betadine 

and injected with 3cc 1%l lidocaine with 40mg depomedrol and 40mg kenalog, 
tolerated well with no complications, neosporin and bandage applied. Injection 
to Right SI joint as above

Pt will call in 3 days on pain

Has PT starting in 2wks.

 

                                        . OMT done

Start PT

May need updated MRI if need to consider epidural

Prednisone

 

                                        . Flexeril and Vimovo

OMT done

Daily stretches

 

                                        Right SI joint cleansed with alchohol an

d betadine and injected with 3cc 1% 

lidocaine with 40mg depomedrol and 40mg kenalog, tolerated well with no 
complications, neosporin and bandage applied. OMT done

Daily stretches

Moist heat or biofreeze

Right SI joint injection

Call in 1week

Vimovo BID

 

                                        . Decrease amlodopine to 1.25mg daily

Schedule with Cardiology

Fasting lab in AM

 

                                        .  Saline nasal flushes prn. Tylenol/Mot

rin prn headache.  Notify if 

persists/symptoms worsening.

Cerumen removal from ears as above



 

                                        . Continue vimovo for 1more week

Increase Omeprazole to BID for 1mo then resume QD if stomach improved

Vestibular exercises with meclizine q HS for next week

 

                                        . Saline nasal flushes prn. Tylenol/Motr

in prn headache.  Notify if 

persists/symptoms worsening.

Add Veramyst

Increase Omeprazole to 20mg po BID

 

                                        . Nasocourt sample given.  Pt. will noti

fy if symptoms are worse on Monday. 

 

                                        . May proceed with hiatal hernia repair 

per Card. so will contact Dr. 

Beckenhauer's office to proceed with surgery



Trial of Lexapro 5mg QD plus use xanax prn

 

                                        . B12 given

Cont oral B12 and iron

Fwup 1mo for B12

Proceed with hiatal hernia repair once card clearance

 

                                        . Check fasting lab in AM--CMP,Lipids, C

BC, Vit D, TSH,FreeT4, B12







Medical Equipment

No Medical Equipment data



Health Concerns Section

Health Concerns data not found



Goals Section

Goals data not found



Interventions Section

Interventions data not found



Health Status Evaluations/Outcomes Section

Health Status Evaluations/Outcomes data not found



Advance Directives

No Advance Directive data

## 2020-02-19 NOTE — XMS REPORT
CCD document using C-CDA

                             Created on: 2019



Leilani Ramírez

External Reference #: [id number]

: 1940

Sex: Female



Demographics





                          Address                   [address] [place]  [address]

 

                          Home Phone                [phone]

 

                          Preferred Language        English

 

                          Marital Status            

 

                          Adventism Affiliation     Unknown

 

                          Race                      White

 

                          Ethnic Group              Not  or 





Author





                          Author                    Leilani rDiver D.O.

 

                          Organization              [facility]

 

                          Address                   [address] [place]  [address]



 

                          Phone                     [phone]







Care Team Providers





                    Care Team Member Name Role                Phone

 

                    Akanksha Driver D.O. PP                  Unavailable

 

                          CCM                       Unavailable



                                            



Summary Purpose

          Interface Exchange                                                    
               



Insurance Providers

                      



                    Payer name                   Policy type / Coverage type    

               

Covered party ID                   Effective Begin Date                   

Effective End Date                

 

                    [employer] [place]

                   

[id number]                   2018                   Unknown                

 

                    [employer] Supplemental                   Medicare Part B 

                  

[id number]                   [id number]                   Unknown                



                                                                                
       



Family history

                      



Father            



                          Diagnosis                   Age At Onset              

  

 

                          Heart disease                   Unknown               

 



            



Mother            



                          Diagnosis                   Age At Onset              

  

 

                          Heart disease                   Unknown               

 

 

                          Cancer                    Unknown                



                                                                                
       



Social History

                      



                    Social History Element                   Codes              

     Description    

                                        Effective Dates                

 

                    Tobacco history                   SNOMED CT: [id number]      

             Never 

smoker                                  2011                

 

                    Marital status                   Unknown                   S

daniela               

                                        2010                

 

                    Number of children                   Unknown                

   9                

                                        2010                



                                                                                
                           



Allergies, Adverse Reactions, Alerts

                      



                Substance                   Reaction                   Codes    

               

Entered Date                   Inactivated Date                   Status        

       

 

                                                            * NO KNOWN FOOD STAR

RGIES                                   

                                    Unknown                   2010        

           No 

Inactive Date                           Active                

 

                                        _                                       

                  

Unknown                   2010                   No Inactive Date         

                                        Active                

 

                                        _                                       

                  

Unknown                   2019                   No Inactive Date         

                                        Active                

 

                                                            QUINIDINE-QUININE AN

ALOGUES                                 

                    hives,                    Unknown                   20

10               

                          No Inactive Date                   Active             

   



                                                                                
                           



Past Medical History

                      



                    Illness                   Codes                   Condition 

Status              

                          Onset Date                   Resolved Date            

    

 

                                                            Coronary atheroscler

osis due to calcified coronary lesion   

                                        ICD-9: 414.00

ICD-10: I25.84                   Active                    2018           

 

                                        Unknown                

 

                                                            Essential (primary) 

hypertension                            

                                        ICD-9: 401.9

ICD-10: I10                   Active                    2014              

 

                                        Unknown                

 

                                                            Hypoglycemia, unspec

ified                                   

                                        ICD-9: 251.2

ICD-10: E16.2                   Active                    2017            

 

                                        Unknown                

 

                                              Other fatigue                     

                ICD-9: 

780.79

ICD-10: R53.83                   Active                    2017           

 

                                        Unknown                

 

                                                            Urinary tract infect

ion, site not specified                 

                                        ICD-9: 599.0

ICD-10: N39.0                   Active                    10/02/2018            

 

                                        Unknown                

 

                                                            Gastro-esophageal re

flux disease without esophagitis        

                                        ICD-9: 530.81

ICD-10: K21.9                   Active                    2018            

 

                                        Unknown                

 

                                              Epigastric pain                   

                  ICD-9: 

789.06

ICD-10: R10.13                   Active                    2018           

 

                                        Unknown                

 

                                                            Atherosclerotic hear

t disease of native coronary artery 

without angina pectoris                                     ICD-9: 414.00

ICD-10: I25.10                   Active                    2018           

 

                                        Unknown                

 

                                                            Generalized anxiety 

disorder                                

                                        ICD-9: 300.00

ICD-10: F41.1                   Active                    2018            

 

                                        Unknown                

 

                                              Palpitations                      

               ICD-9: 

785.1

ICD-10: R00.2                   Active                    10/02/2018            

 

                                        Unknown                

 

                                              Dizziness and giddiness           

                          

ICD-9: 780.4

ICD-10: R42                   Active                    2014              

 

                                        Unknown                

 

                                                            Occlusion and stenos

is of bilateral carotid arteries        

                                        ICD-9: 433.10

ICD-10: I65.23                   Active                    2018           

 

                                        Unknown                

 

                                              FLU VACCINE                       

              ICD-9: 

V04.81

ICD-10: Z23                   Active                    2012              

 

                                        Unknown                

 

                                              Cervicalgia                       

              ICD-9: 723.1

ICD-10: M54.2                   Active                    2018            

 

                                        Unknown                

 

                                                            Other spondylosis wi

th radiculopathy, cervical region       

                                        ICD-9: 721.0

ICD-10: M47.22                   Active                    2018           

 

                                        Unknown                

 

                                              Cervicocranial syndrome           

                          

ICD-9: 723.2

ICD-10: M53.0                   Active                    2018            

 

                                        Unknown                

 

                                                            Vertigo of central o

rigin, bilateral                        

                                        ICD-9: 386.2

ICD-10: H81.43                   Active                    2018           

 

                                        Unknown                

 

                                                            Benign paroxysmal ve

rtigo, bilateral                        

                                        ICD-9: 386.11

ICD-10: H81.13                   Active                    2018           

 

                                        Unknown                

 

                                              Otalgia, bilateral                

                     ICD-

9: 388.70

ICD-10: H92.03                   Active                    2018           

 

                                        Unknown                

 

                                                            Left lower quadrant 

pain                                    

                                        ICD-9: 789.04

ICD-10: R10.32                   Active                    2017           

 

                                        Unknown                

 

                                              Low back pain                     

                ICD-9: 

724.2

ICD-10: M54.5                   Active                    2017            

 

                                        Unknown                

 

                                                            Radiculopathy, lumbo

sacral region                           

                                        ICD-9: 724.4

ICD-10: M54.17                   Active                    2018           

 

                                        Unknown                

 

                                                            Impaired fasting glu

cose                                    

                                        ICD-9: 790.29

ICD-10: R73.01                   Active                    2012           

 

                                        Unknown                

 

                                              Mixed hyperlipidemia              

                       

ICD-9: 272.4

ICD-10: E78.2                   Active                    2017            

 

                                        Unknown                

 

                                                            Other intervertebral

 disc degeneration, lumbar region       

                                        ICD-9: 722.52

ICD-10: M51.36                   Active                    2017           

 

                                        Unknown                

 

                                              Pain in thoracic spine            

                         

ICD-9: 724.1

ICD-10: M54.6                   Active                    2017            

 

                                        Unknown                

 

                                              Mastodynia                        

             ICD-9: 611.71

ICD-10: N64.4                   Active                    2017            

 

                                        Unknown                

 

                                                            Acute upper respirat

ory infection, unspecified              

                                        ICD-9: 465.9

ICD-10: J06.9                   Active                    2017            

 

                                        Unknown                

 

                                                            Acute mastoiditis wi

thout complications, right ear          

                                        ICD-9: 383.00

ICD-10: H70.001                   Active                    2016          

 

                                        Unknown                

 

                                                            Constipation, unspec

ified                                   

                                        ICD-9: 564.00

ICD-10: K59.00                   Active                    2016           

 

                                        Unknown                

 

                                                            Chronic kidney disea

se, stage 1                             

                                        ICD-9: 585.1

ICD-10: N18.1                   Active                    03/15/2016            

 

                                        Unknown                

 

                                              History of falling                

                     ICD-

9: V15.88

ICD-10: Z91.81                   Active                    12/15/2015           

 

                                        Unknown                

 

                                                            Muscle weakness (gen

eralized)                               

                                        ICD-9: 780.79

ICD-10: M62.81                   Active                    2015           

 

                                        Unknown                

 

                                              Acute renal failure               

                      ICD-

9: 584.9                   Active                    2015                 

 

                                        Unknown                

 

                                              POLYURIA                          

           ICD-9: 788.42  

                    Active                   2015                   Unknow

n   

            

 

                                              CAD                               

      ICD-9: 414.00       

                    Active                   09/10/2015                   Unknow

n        

       

 

                                              Hyperglycemia                     

                ICD-9: 

790.29                   Active                   2012                   

Unknown                

 

                                              HYPERLIPIDEMIA NEC/NOS            

                         

ICD-9: 272.4                   Active                    09/10/2015             

 

                                        Unknown                

 

                                              ABDOMINAL PAIN                    

                 ICD-9: 

789.00                   Active                   10/10/2012                   

Unknown                

 

                                              Grieving                          

           ICD-9: 309.0   

                    Active                   2015                   Unknow

n    

           

 

                                              HYPOGLYCEMIA                      

               ICD-9: 

251.2                   Active                   2012                   

Unknown                

 

                                              MALAISE AND FATIGUE               

                      ICD-

9: 780.79                   Active                    2015                

 

                                        Unknown                

 

                                              CERUMEN IMPACTION                 

                    ICD-9:

380.4                   Active                   2015                   

Unknown                

 

                                              Leg pain                          

           ICD-9: 729.5   

                    Active                   2015                   Unknow

n    

           

 

                                              SUPERFIC PHLEBITIS-LEG            

                         

ICD-9: 451.0                   Active                    2015             

 

                                        Unknown                

 

                                              SPASM OF MUSCLE                   

                  ICD-9: 

728.85                   Active                   2015                   

Unknown                

 

                                              Thoracic back pain                

                     ICD-

9: 724.1                   Active                    2015                 

 

                                        Unknown                

 

                                                            Benign positional ve

rtigo                                   

                    ICD-9: 386.11                   Active                               

                                        Unknown                

 

                                              Suspicious nevus                  

                   ICD-9: 

238.2                   Active                   2015                   

Unknown                

 

                                                            EUSTACHIAN TUBE DYSF

UNCTION                                 

                    ICD-9: 381.81                   Active                             

                                        Unknown                

 

                                              SINUSITIS, ACUTE                  

                   ICD-9: 

461.9                   Active                   2014                   

Unknown                

 

                                              DIZZINESS/VERTIGO                 

                    ICD-9:

780.4                   Active                   2014                   

Unknown                

 

                                              HYPERTENSION                      

               ICD-9: 

401.9                   Active                   2014                   

Unknown                

 

                                              URI, ACUTE                        

             ICD-9: 465.9 

                    Active                   10/15/2013                   Unknow

n  

             

 

                                              CEPHALGIA                         

            ICD-9: 784.0  

                    Active                   2013                   Unknow

n   

            

 

                                              OTITIS MEDIA NOS                  

                   ICD-9: 

382.9                   Active                   2013                   

Unknown                

 

                                              Perforation of ear drum           

                          

ICD-9: 384.20                   Active                    05/10/2013            

 

                                        Unknown                

 

                                              Mastalgia                         

            ICD-9: 611.71 

                    Active                   2013                   Unknow

n  

             

 

                                              DYSPEPSIA                         

            ICD-9: 536.8  

                    Active                   10/10/2012                   Unknow

n   

            

 

                                              IBS                               

      ICD-9: 564.1        

                    Active                   10/10/2012                   Unknow

n         

      

 

                                              FLU VACCINE                       

              ICD-9: 

V04.81                   Active                   2012                   

Unknown                

 

                                              Radiculopathy of leg              

                       

ICD-9: 724.4                   Active                    2012             

 

                                        Unknown                

 

                                              SCIATICA                          

           ICD-9: 724.3   

                    Active                   2012                   Unknow

n    

           

 

                                                            Lumbar degenerative 

disc disease                            

                    ICD-9: 722.52                   Active                        

                                        Unknown                

 

                                              Sacroiliac dysfunction            

                         

ICD-9: 739.4                   Active                    2012             

 

                                        Unknown                

 

                                              Hypertension                      

               Unknown    

                    Active                   2012                   Unknow

n     

          

 

                                              PAIN, LOWER BACK                  

                   ICD-9: 

724.2                   Active                   2011                   

Unknown                

 

                                              SPINAL ENTHESOPATHY               

                      ICD-

9: 720.1                   Active                    2011                 

 

                                        Unknown                

 

                                              Hypotension                       

              ICD-9: 458.9

                    Active                   2011                   Unknow

n 

              

 

                                              SACROILIITIS NEC                  

                   ICD-9: 

720.2                   Active                   2011                   

Unknown                

 

                                              PHARYNGITIS, ACUTE                

                     ICD-

9: 462                   Active                   2010                   

Unknown                

 

                                              URINARY TRACT INFECTION           

                          

ICD-9: 599.0                   Active                    2010             

 

                                        Unknown                

 

                                              Heat exhaustion                   

                  ICD-9: 

992.5                   Active                   2010                   

Unknown                

 

                                              Esophagitis                       

              ICD-9: 

530.10                   Active                   2010                   

Unknown                

 

                                              Gastritis                         

            ICD-9: 535.50 

                    Active                   2010                   Unknow

n  

             

 

                                              GERD                              

       ICD-9: 530.81      

                    Active                   2010                   Unknow

n       

        

 

                                              Hiatal hernia                     

                ICD-9: 

553.3                   Active                   2010                   

Unknown                

 

                                              B12 DEFIC ANEMIA NEC              

                       

ICD-9: 281.1                   Active                    2010             

 

                                        Unknown                

 

                                              Anxiety                           

          Unknown         

                    Active                   2010                   Unknow

n          

     

 

                                              Heart disease                     

                Unknown   

                    Active                   2010                   Unknow

n    

           

 

                                              Hyperlipidemia                    

                 Unknown  

                    Active                   2010                   Unknow

n   

            

 

                                              ANXIETY STATE NOS                 

                    ICD-9:

300.00                   Active                   2010                   

Unknown                

 

                                              DEPRESSIVE DISORDER NEC           

                          

ICD-9: 311                   Active                    2010               

 

                                        Unknown                



                                                                                
                                                                                
                                                                                
                                                                                
                                                                                
                                                                                
                                                                                
                                                                                
                                                                                
                                                                                
                                                                                
                 



Problems

                      



                    Condition                   Codes                   Effectiv

e Dates             

                                        Condition Status                

 

                                                            Coronary atheroscler

osis due to calcified coronary lesion   

                                        ICD-9: 414.00

ICD-10: I25.84                   2018                   Active            

   

 

                                                            Essential (primary) 

hypertension                            

                                        ICD-9: 401.9

ICD-10: I10                   2014                   Active               



 

                                                            Hypoglycemia, unspec

ified                                   

                                        ICD-9: 251.2

ICD-10: E16.2                   2017                   Active             

  

 

                                              Other fatigue                     

                ICD-9: 

780.79

ICD-10: R53.83                   2017                   Active            

   

 

                                                            Urinary tract infect

ion, site not specified                 

                                        ICD-9: 599.0

ICD-10: N39.0                   10/02/2018                   Active             

  

 

                                                            Gastro-esophageal re

flux disease without esophagitis        

                                        ICD-9: 530.81

ICD-10: K21.9                   2018                   Active             

  

 

                                              Epigastric pain                   

                  ICD-9: 

789.06

ICD-10: R10.13                   2018                   Active            

   

 

                                                            Atherosclerotic hear

t disease of native coronary artery 

without angina pectoris                                     ICD-9: 414.00

ICD-10: I25.10                   2018                   Active            

   

 

                                                            Generalized anxiety 

disorder                                

                                        ICD-9: 300.00

ICD-10: F41.1                   2018                   Active             

  

 

                                              Palpitations                      

               ICD-9: 

785.1

ICD-10: R00.2                   10/02/2018                   Active             

  

 

                                              Dizziness and giddiness           

                          

ICD-9: 780.4

ICD-10: R42                   2014                   Active               



 

                                                            Occlusion and stenos

is of bilateral carotid arteries        

                                        ICD-9: 433.10

ICD-10: I65.23                   2018                   Active            

   

 

                                              FLU VACCINE                       

              ICD-9: 

V04.81

ICD-10: Z23                   2012                   Active               



 

                                              Cervicalgia                       

              ICD-9: 723.1

ICD-10: M54.2                   2018                   Active             

  

 

                                                            Other spondylosis wi

th radiculopathy, cervical region       

                                        ICD-9: 721.0

ICD-10: M47.22                   2018                   Active            

   

 

                                              Cervicocranial syndrome           

                          

ICD-9: 723.2

ICD-10: M53.0                   2018                   Active             

  

 

                                                            Vertigo of central o

rigin, bilateral                        

                                        ICD-9: 386.2

ICD-10: H81.43                   2018                   Active            

   

 

                                                            Benign paroxysmal ve

rtigo, bilateral                        

                                        ICD-9: 386.11

ICD-10: H81.13                   2018                   Active            

   

 

                                              Otalgia, bilateral                

                     ICD-

9: 388.70

ICD-10: H92.03                   2018                   Active            

   

 

                                                            Left lower quadrant 

pain                                    

                                        ICD-9: 789.04

ICD-10: R10.32                   2017                   Active            

   

 

                                              Low back pain                     

                ICD-9: 

724.2

ICD-10: M54.5                   2017                   Active             

  

 

                                                            Radiculopathy, lumbo

sacral region                           

                                        ICD-9: 724.4

ICD-10: M54.17                   2018                   Active            

   

 

                                                            Impaired fasting glu

cose                                    

                                        ICD-9: 790.29

ICD-10: R73.01                   2012                   Active            

   

 

                                              Mixed hyperlipidemia              

                       

ICD-9: 272.4

ICD-10: E78.2                   2017                   Active             

  

 

                                                            Other intervertebral

 disc degeneration, lumbar region       

                                        ICD-9: 722.52

ICD-10: M51.36                   2017                   Active            

   

 

                                              Pain in thoracic spine            

                         

ICD-9: 724.1

ICD-10: M54.6                   2017                   Active             

  

 

                                              Mastodynia                        

             ICD-9: 611.71

ICD-10: N64.4                   2017                   Active             

  

 

                                                            Acute upper respirat

ory infection, unspecified              

                                        ICD-9: 465.9

ICD-10: J06.9                   2017                   Active             

  

 

                                                            Acute mastoiditis wi

thout complications, right ear          

                                        ICD-9: 383.00

ICD-10: H70.001                   2016                   Active           

    

 

                                                            Constipation, unspec

ified                                   

                                        ICD-9: 564.00

ICD-10: K59.00                   2016                   Active            

   

 

                                                            Chronic kidney disea

se, stage 1                             

                                        ICD-9: 585.1

ICD-10: N18.1                   03/15/2016                   Active             

  

 

                                              History of falling                

                     ICD-

9: V15.88

ICD-10: Z91.81                   12/15/2015                   Active            

   

 

                                                            Muscle weakness (gen

eralized)                               

                                        ICD-9: 780.79

ICD-10: M62.81                   2015                   Active            

   

 

                                              Acute renal failure               

                      ICD-

9: 584.9                   2015                   Active                

 

                                              POLYURIA                          

           ICD-9: 788.42  

                          2015                   Active                

 

                                              CAD                               

      ICD-9: 414.00       

                          09/10/2015                   Active                

 

                                              Hyperglycemia                     

                ICD-9: 

790.29                    2012                   Active                

 

                                              HYPERLIPIDEMIA NEC/NOS            

                         

ICD-9: 272.4                   09/10/2015                   Active              

 

 

                                              ABDOMINAL PAIN                    

                 ICD-9: 

789.00                    10/10/2012                   Active                

 

                                              Grieving                          

           ICD-9: 309.0   

                          2015                   Active                

 

                                              HYPOGLYCEMIA                      

               ICD-9: 

251.2                     2012                   Active                

 

                                              MALAISE AND FATIGUE               

                      ICD-

9: 780.79                   2015                   Active                

 

                                              CERUMEN IMPACTION                 

                    ICD-9:

380.4                     2015                   Active                

 

                                              Leg pain                          

           ICD-9: 729.5   

                          2015                   Active                

 

                                              SUPERFIC PHLEBITIS-LEG            

                         

ICD-9: 451.0                   2015                   Active              

 

 

                                              SPASM OF MUSCLE                   

                  ICD-9: 

728.85                    2015                   Active                

 

                                              Thoracic back pain                

                     ICD-

9: 724.1                   2015                   Active                

 

                                                            Benign positional ve

rtigo                                   

                    ICD-9: 386.11                   2015                  

 Active            

   

 

                                              Suspicious nevus                  

                   ICD-9: 

238.2                     2015                   Active                

 

                                                            EUSTACHIAN TUBE DYSF

UNCTION                                 

                    ICD-9: 381.81                   2014                  

 Active          

     

 

                                              SINUSITIS, ACUTE                  

                   ICD-9: 

461.9                     2014                   Active                

 

                                              DIZZINESS/VERTIGO                 

                    ICD-9:

780.4                     2014                   Active                

 

                                              HYPERTENSION                      

               ICD-9: 

401.9                     2014                   Active                

 

                                              URI, ACUTE                        

             ICD-9: 465.9 

                          10/15/2013                   Active                

 

                                              CEPHALGIA                         

            ICD-9: 784.0  

                          2013                   Active                

 

                                              OTITIS MEDIA NOS                  

                   ICD-9: 

382.9                     2013                   Active                

 

                                              Perforation of ear drum           

                          

ICD-9: 384.20                   05/10/2013                   Active             

  

 

                                              Mastalgia                         

            ICD-9: 611.71 

                          2013                   Active                

 

                                              DYSPEPSIA                         

            ICD-9: 536.8  

                          10/10/2012                   Active                

 

                                              IBS                               

      ICD-9: 564.1        

                          10/10/2012                   Active                

 

                                              FLU VACCINE                       

              ICD-9: 

V04.81                    2012                   Active                

 

                                              Radiculopathy of leg              

                       

ICD-9: 724.4                   2012                   Active              

 

 

                                              SCIATICA                          

           ICD-9: 724.3   

                          2012                   Active                

 

                                                            Lumbar degenerative 

disc disease                            

                    ICD-9: 722.52                   2012                  

 Active     

          

 

                                              Sacroiliac dysfunction            

                         

ICD-9: 739.4                   2012                   Active              

 

 

                                              Hypertension                      

               Unknown    

                          2012                   Active                

 

                                              PAIN, LOWER BACK                  

                   ICD-9: 

724.2                     2011                   Active                

 

                                              SPINAL ENTHESOPATHY               

                      ICD-

9: 720.1                   2011                   Active                

 

                                              Hypotension                       

              ICD-9: 458.9

                          2011                   Active                

 

                                              SACROILIITIS NEC                  

                   ICD-9: 

720.2                     2011                   Active                

 

                                              PHARYNGITIS, ACUTE                

                     ICD-

9: 462                    2010                   Active                

 

                                              URINARY TRACT INFECTION           

                          

ICD-9: 599.0                   2010                   Active              

 

 

                                              Heat exhaustion                   

                  ICD-9: 

992.5                     2010                   Active                

 

                                              Esophagitis                       

              ICD-9: 

530.10                    2010                   Active                

 

                                              Gastritis                         

            ICD-9: 535.50 

                          2010                   Active                

 

                                              GERD                              

       ICD-9: 530.81      

                          2010                   Active                

 

                                              Hiatal hernia                     

                ICD-9: 

553.3                     2010                   Active                

 

                                              B12 DEFIC ANEMIA NEC              

                       

ICD-9: 281.1                   2010                   Active              

 

 

                                              Anxiety                           

          Unknown         

                          2010                   Active                

 

                                              Heart disease                     

                Unknown   

                          2010                   Active                

 

                                              Hyperlipidemia                    

                 Unknown  

                          2010                   Active                

 

                                              ANXIETY STATE NOS                 

                    ICD-9:

300.00                    2010                   Active                

 

                                              DEPRESSIVE DISORDER NEC           

                          

ICD-9: 311                   2010                   Active                



                                                                                
                                                                                
                                                                                
                                                                                
                                                                                
                                                                                
                                                                                
                                                                                
                                                                                
                                                                                
                                                                                
                 



Medications

                      



                    Medication                   Codes                   Instruc

tions               

                    Start Date                   Stop Date                   Sta

tus              

                                        Fill Instructions                

 

                                                            simvastatin 40 mg ta

blet                                    

                    RxNorm: 468307                   1 Tablet(s) PO QD          

         2019                   Active                         

   

      

 

                                                            simvastatin 40 mg ta

blet                                    

                    RxNorm: 639766                   1 Tablet(s) PO QD          

         2019                   Inactive                       

   

        

 

                                                            omeprazole 40 mg cap

alicia,delayed release                    

                          RxNorm: 103581                   1 Capsule(s) PO QD   

          

                    2019                   Ac

tive           

                                                        

 

                                                            simvastatin 40 mg ta

blet                                    

                    RxNorm: 403299                   1 Tablet(s) PO QD          

         2019                   Inactive                       

   

        

 

                                                            omeprazole 40 mg cap

alicia,delayed release                    

                          RxNorm: 209945                   1 Capsule(s) PO QD   

          

                    2019                   In

active         

                                                        

 

                                                            Bactrim  mg-16

0 mg tablet                             

                    RxNorm: 055722                   1 Tablet(s) PO BID         

          

2019                   Inactive              

                                                        

 

                                                            Bactrim  mg-16

0 mg tablet                             

                    RxNorm: 375165                   1 Tablet(s) PO BID         

          

2019                   Inactive              

                                                        

 

                                                            metoprolol tartrate 

25 mg tablet                            

                    RxNorm: 468965                   1 Tablet(s) PO BID         

          

2019                   Active                

                                                        

 

                                              Macrobid 100 mg capsule           

                          

RxNorm: 160709                   1 Capsule(s) PO BID                   

2019                   Inactive              

                                                        

 

                                              Xanax 0.25 mg tablet              

                       

RxNorm: 310511                          TAKE 1 TABLET BY MOUTH TWICE DAILY      

       

                    2018                   No Stop Date                   

Active         

                                                        

 

                                              Xanax 0.25 mg tablet              

                       

RxNorm: 181477                   1 Tablet(s) PO BID                   2018                   Inactive                       

  

         

 

                                                            Protonix 40 mg table

t,delayed release                       

                          RxNorm: 427009                   1 Tablet(s) PO BID fo

r 

stomach--replaces omeprazole                   2018                   Inactive                                   

 

                                              Carafate 1 gram tablet            

                         

RxNorm: 185685                   1 Tablet(s) PO AC & HS                   

2018                   Inactive              

                                                        

 

                                              Xanax 0.25 mg tablet              

                       

RxNorm: 167681                   1 Tablet(s) PO BID                   10/30/2018

                    12/10/2018                   Inactive                       

  

         

 

                                                            Bactrim  mg-16

0 mg tablet                             

                    RxNorm: 241524                   1 Tablet(s) PO BID         

          

10/04/2018                   10/08/2018                   Inactive              

                                                        

 

                                                            Bactrim  mg-16

0 mg tablet                             

                    RxNorm: 779023                   1 Tablet(s) PO BID         

          

10/04/2018                   10/03/2018                   Inactive              

                                                        

 

                                              Carafate 1 gram tablet            

                         

RxNorm: 227222                   1 Tablet(s) PO AC & HS                   

2018                   10/17/2018                   Inactive              

                                                        

 

                                                            Protonix 40 mg table

t,delayed release                       

                          RxNorm: 465597                   1 Tablet(s) PO BID fo

r 

stomach--replaces omeprazole                   2018                   Inactive                                   

 

                                                            Protonix 40 mg table

t,delayed release                       

                          RxNorm: 557899                   1 Tablet(s) PO BID fo

r 

stomach--replaces omeprazole                   2018                   Inactive                                   

 

                                                            fluticasone 50 mcg/a

ctuation nasal spray,suspension         

                           RxNorm: 8604755                   2 Spray NASAL QD to

each nostril                   2018          

                          Inactive                                   

 

                                                            Nasonex 50 mcg/actua

tion Spray                              

                    RxNorm: 9508360                   2 Spray NASAL             

      

2018                   Inactive              

                                                        

 

                                                            omeprazole 40 mg cap

alicia,delayed release                    

                          RxNorm: 131644                   1 Capsule(s) PO QD   

          

                    2018                   08/15/2018                   In

active         

                                                        

 

                                                            simvastatin 40 mg ta

blet                                    

                          RxNorm: 748442                   1 Tablet(s) PO QD ROBLES

E 1 TABLET EVERY DAY      

                    2018                   In

active  

                                                        

 

                                                            metoprolol tartrate 

25 mg tablet                            

                    RxNorm: 812007                   1/2 Tablet(s) PO QD        

           

2018                   Inactive              

                                                        

 

                                                            simvastatin 40 mg ta

blet                                    

                          RxNorm: 649637                   Tablet(s) TAKE 1 TABL

ET EVERY DAY              

                    2017                   In

active          

                                                        

 

                                                            metoprolol tartrate 

25 mg tablet                            

                    RxNorm: 617826                   1/2 Tablet(s) PO QD        

           

2017                   Inactive              

                                                        

 

                                                            Flonase 50 mcg/actua

tion nasal spray,suspension             

                          RxNorm: 0296946                   2 Spray NASAL BID   

   

                    2017                   In

active  

                                                        

 

                                                            omeprazole 40 mg cap

alicia,delayed release                    

                          RxNorm: 733597                   1 Capsule(s) PO QD   

          

                    2017                   In

active         

                                                        

 

                                                            metoprolol tartrate 

25 mg tablet                            

                    RxNorm: 816045                   1/2 Tablet(s) PO QD        

           

04/10/2017                   2017                   Inactive              

                                                        

 

                                                            ciprofloxacin 0.2 % 

ear drops in a dropperette              

                          RxNorm: 205267                   4 Drop(s) OTIC TID fo

r 1 

week                      2016               

   

                          Inactive                                   

 

                                              cefdinir 300 mg capsule           

                          

RxNorm: 438476                   2 Capsule(s) PO QD                   2016                   Inactive                       

  

         

 

                                                            omeprazole 40 mg cap

alicia,delayed release                    

                          RxNorm: 053778                   TAKE 1 CAPSULE EVERY 

DAY       

                    2016                   In

active   

                                                        

 

                                                            simvastatin 40 mg ta

blet                                    

                    RxNorm: 359388                   TAKE 1 TABLET EVERY DAY    

               

2016                   Inactive              

                                                        

 

                                                            Flonase 50 mcg/actua

tion nasal spray,suspension             

                          RxNorm: 4052697                   2 Spray NASAL BID   

   

                    2015                   In

active  

                                                        

 

                                                            cefuroxime axetil 25

0 mg tablet                             

                    RxNorm: 961614                   1 Tablet(s) PO BID         

          

2015                   Inactive              

                                                        

 

                                                            cefuroxime axetil 25

0 mg tablet                             

                    RxNorm: 567218                   1 Tablet(s) PO BID         

          

2015                   Inactive              

                                                        

 

                                                            omeprazole 40 mg cap

alicia,delayed release                    

                          RxNorm: 819311                   1 Capsule(s) PO QD   

          

                    2015                   In

active         

                                                        

 

                                              meloxicam 7.5 mg tablet           

                          

RxNorm: 630745                   1 Tablet(s) PO QD                   2015                   Inactive                       

   

        

 

                                              loratadine 10 mg tablet           

                          

RxNorm: 770038                          1 Tablet(s) PO QAM for allergies        

       

                    2014                   In

active           

                                                        

 

                                                            Flonase 50 mcg/actua

tion nasal spray,suspension             

                          RxNorm: 937098                   2 Spray NASAL BID    

   

                    2014                   12/15/2015                   In

active   

                                                        

 

                                              prednisone 20 mg tablet           

                          

RxNorm: 384701                   2 Tablet(s) PO BID                   2014                   Inactive                       

  

         

 

                                                            Dyazide 37.5 mg-25 m

g capsule                               

                    RxNorm: 404102                   1 Capsule(s) PO QAM        

           

2014                   Inactive              

                                                        

 

                                              Ceftin 500 mg tablet              

                       

RxNorm: 837068                   1 Tablet(s) PO BID                   2014                   Inactive                       

  

         

 

                                              Ceftin 500 mg tablet              

                       

RxNorm: 049203                   1 Tablet(s) PO BID                   2014                   Inactive                       

  

         

 

                                                            simvastatin 40 mg ta

blet                                    

                          RxNorm: 040106                   Tablet(s) PO TAKE ONE

 TABLET BY MOUTH EVERY DAY

                    2014                   

Inactive                                                

 

                                                            metoprolol tartrate 

25 mg tablet                            

                          RxNorm: 363226                   Tablet(s) PO TAKE ONE

-HALF TABLET BY 

MOUTH EVERY DAY                   2014       

                          Inactive                                   

 

                                              Ceftin 500 mg tablet              

                       

RxNorm: 991887                   1 Tablet(s) PO BID                   10/15/2013

                    10/24/2013                   Inactive                       

  

         

 

                                              loratadine 10 mg tablet           

                          

RxNorm: 589025                          1 Tablet(s) PO QAM for allergies        

       

                    2013                   In

active           

                                                        

 

                                                            omeprazole 20 mg cap

alicia,delayed release                    

                          RxNorm: 755074                   Capsule(s) PO TAKE ON

E CAPSULE 

BY MOUTH TWICE DAILY                   2013  

                          Inactive                                   

 

                                                            omeprazole 20 mg cap

alicia,delayed release                    

                          RxNorm: 380494                   1 Capsule(s) PO BID  

          

                    2013                   In

active        

                                                        

 

                                                            Augmentin 875 mg-125

 mg tablet                              

                    RxNorm: 203126                   1 Tablet(s) PO Q12H        

           

05/10/2013                   2013                   Inactive              

                                                        

 

                                                            Floxin Otic Drops 1 

bottle Drops                            

                    RxNorm:                    5 Drop(s) OTIC BID               

    

05/10/2013                   2013                   Inactive              

                                                        

 

                                                            metoprolol tartrate 

25 mg tablet                            

                          RxNorm: 063656                   Tablet(s) PO TAKE ONE

-HALF TABLET BY 

MOUTH EVERY DAY                   2013       

                          Inactive                                   

 

                                                            simvastatin 40 mg ta

blet                                    

                          RxNorm: 577045                   Tablet(s) PO TAKE ONE

 TABLET BY MOUTH EVERY DAY

                    2013                   

Inactive                                                

 

                                              lancets                           

          RxNorm:         

                                        Misc Miscellaneous USE ONE TO CHECK GLUC

OSE EVERY DAY                 

                    2013                   In

active             

                                                        

 

                                              Carafate 1 gram tablet            

                         

RxNorm: 068580                   1 Tablet(s) PO AC & HS                   

2012                   Inactive              

                                                        

 

                                              Flagyl 500 mg tablet              

                       

RxNorm: 713124                   1 Tablet(s) PO TID                   10/10/2012

                    10/16/2012                   Inactive                       

  

         

 

                                              Carafate 1 gram tablet            

                         

RxNorm: 243139                   1 Tablet(s) PO AC & HS                   

10/10/2012                   2012                   Inactive              

                                                        

 

                                              One Touch Test strips             

                        

RxNorm:                    Miscellaneous QD                   2012                   Inactive                   one 

touch 

ultra mini test strips                

 

                                              Protonix 40 mg Tab                

                     

RxNorm: 286752                   1 Tablet(s) PO QD                   2012                   Inactive                       

   

        

 

                                              Protonix 40 mg Tab                

                     

RxNorm: 076073                   1 Tablet(s) PO QD                   2012 

                    10/09/2012                   Inactive                       

   

        

 

                                              prednisone 20 mg Tab              

                       

RxNorm: 125416                   1 Tablet(s) PO BID                   2012                   Inactive                       

  

         

 

                                              Flexeril 5 mg Tab                 

                    

RxNorm: 478399                   1 Tablet(s) PO QHS for spasm                   

2012                   Inactive              

                                                        

 

                                              Flexeril 5 mg Tab                 

                    

RxNorm: 698705                   1 Tablet(s) PO QHS for spasm                   

2012                   Inactive              

                                                        

 

                                              amlodipine 2.5 mg Tab             

                        

RxNorm: 331070                          1/2 Tablet(s) PO QD replaces 5mg dose   

       

                    2012                   In

active      

                                                        

 

                                                            simvastatin 40 mg ta

blet                                    

                    RxNorm: 435178                   1 Tablet(s) PO QD          

         2012                   Inactive                       

   

        

 

                                                            metoprolol tartrate 

25 mg tablet                            

                    RxNorm: 685828                   1/2 Tablet(s) PO QD        

           

2012                   Inactive              

                                                        

 

                                                            omeprazole 20 mg cap

alicia,delayed release                    

                          RxNorm: 340709                   1 Capsule(s) PO BID  

          

                    2012                   In

active        

                                                        

 

                                              cefdinir 300 mg Cap               

                      

RxNorm: 301749                   2 Capsule(s) PO QD                   2011                   Inactive                       

  

         

 

                                              simvastatin 40 mg Tab             

                        

RxNorm: 208284                   1 Tablet(s) PO QD                   2011                   Inactive                       

   

        

 

                                                            metoprolol tartrate 

25 mg Tab                               

                    RxNorm: 777535                   1/2 Tablet(s) PO QD        

           

10/25/2011                   2012                   Inactive              

                                                        

 

                                                            metoprolol tartrate 

25 mg Tab                               

                    RxNorm: 393986                   1/2 Tablet(s) PO QD        

           

10/24/2011                   10/24/2011                   Inactive              

                                                        

 

                                              meclizine 25 mg Tab               

                      

RxNorm: 1474347                   1 Tablet(s) PO QHS                   

2011                   Inactive              

                                        for dizziness                

 

                                              Ceftin 500 mg Tab                 

                    

RxNorm: 654796                   1 Tablet(s) PO BID                   2011                   Inactive                       

  

         

 

                                                            omeprazole 20 mg Cap

, Delayed Release                       

                          RxNorm: 336526                   1 Capsule(s) PO BID  

             

                    2011                   10/24/2011                   In

active           

                                                        

 

                                              simvastatin 40 mg Tab             

                        

RxNorm: 856657                   1 Tablet(s) PO QD                   2011                   Inactive                       

   

        

 

                                              simvastatin 40 mg Tab             

                        

RxNorm: 378521                   1 Tablet(s) PO QD                   2011                   Inactive                       

   

        

 

                                              simvastatin 40 mg Tab             

                        

RxNorm: 792432                   1 Tablet(s) PO QD                   2010                   Inactive                       

   

        

 

                                                            Tessalon Perles 100 

mg Cap                                  

                    RxNorm: 340324                   1 Capsule(s) PO Q6-8H      

             

2010                   Inactive              

                                                        

 

                                              cefdinir 300 mg Cap               

                      

RxNorm: 387912                   1 Capsule(s) PO BID                   

2010                   Inactive              

                                                        

 

                                              Lexapro 10 mg Tab                 

                    

RxNorm: 452038                   1 Tablet(s) PO QD                   2010                   Inactive                       

   

        

 

                                              Cipro 250 mg Tab                  

                   RxNorm:

421746                    1 Tablet(s) PO BID                   2010       

 

                    10/04/2010                   Inactive                       

          

 

 

                                                            Wellbutrin  mg

 24 hr Tab                              

                    RxNorm: 722655                   1 Tablet(s) PO QAM         

          

2010                   Inactive              

                                                        

 

                                              Fish Oil 1,000 mg Cap             

                        

RxNorm:                    1 Capsule(s) PO QD                   No Start Date   

                                        Active                                  

 

 

                                                            Tylenol Extra Streng

th 500 mg tablet                        

                          RxNorm: 420482                   1/2 Tablet(s) PO as n

eeded         

                    No Start Date                                       Active  

           

                                                        

 

                                                            nitroglycerin 0.4 mg

 sublingual tablet                      

                          RxNorm: 220013                   Tablet(s) SL as neede

d           

                    No Start Date                                       Active  

             

                                                        

 

                                                            Calcium with Vitamin

 D 600 mg (1,500 mg)-400 unit tablet    

                                RxNorm: 819814                   1 Tablet(s) PO 

QD                   No Start Date                                       Active 

                                                        

 

                                                            Multivitamin & [place]

al Formula Tab                          

                    RxNorm:                    1 Tablet(s) PO QD                

   No 

Start Date                                       Active                         

         

 

                                                            vitamin B complex ca

psule                                   

                    RxNorm:                    1 Capsule(s) PO QD               

    No Start Date  

                                        Active                                  

 

 

                                              Aspirin 81 mg Tab                 

                    

RxNorm: 628336                   1 Tablet(s) PO QD                   No Start 

Date                                       Active                               

   

 

                                                            isosorbide mononitra

te ER 30 mg tablet,extended release 24 

hr                                     RxNorm: 362989                   1 

Tablet(s) PO QHS                   No Start Date                                

                          Active                                    

 

                                                            Vitamin D 1,000 unit

 Cap                                    

                    RxNorm: 344169                   1 Capsule(s) PO QD         

          No Start 

Date                                       Active                               

   

 

                                              Co Q-10 oral                      

               RxNorm: 

02951                   oral                   No Start Date                    

                          Active                                    

 

                                                            Flonase Allergy Reli

ef 50 mcg/actuation nasal 

spray,suspension                                     RxNorm: 3450253            

                    2 [place] NASAL QHS                   No Start Date           

              

                          Active                                    

 

                                                            metoprolol tartrate 

25 mg Tab                               

                    RxNorm: 333165                   1/2 Tablet(s) PO QD        

           No 

Start Date                   10/23/2011                   Inactive              

                                                        

 

                                                            Nasonex 50 mcg/actua

tion Spray                              

                    RxNorm: 2750579                   2 Spray NASAL             

      No Start

Date                   2018                   Inactive                    

              

 

                                                            Vitamin B12 1000mcg 

Tablet                                  

                    RxNorm:                    1 Tablet(s) PO QD                

   No Start Date  

                    2015                   Inactive                       

    

       

 

                                              amlodipine 5 mg Tab               

                      

RxNorm: 283125                   1 Tablet(s) PO QD                   No Start 

Date                   2012                   Inactive                    

              

 

                                                            simvastatin 40 mg ta

blet                                    

                    RxNorm: 587325                   1 Tablet(s) PO QD          

         No Start 

Date                   2019                   Inactive                    

              

 

                                                            omeprazole 20 mg cap

alicia,delayed release                    

                          RxNorm: 156103                   1 Capsule(s) PO BID  

          

                    No Start Date                   2013                  

 Inactive     

                                                        

 

                                                            omeprazole 40 mg cap

alicia,delayed release                    

                          RxNorm: 508566                   1 Capsule(s) PO QD   

          

                    No Start Date                   2019                  

 Inactive      

                                                        

 

                                              Nexium 40 mg Cap                  

                   RxNorm:

236164                    1 Capsule(s) PO QD                   No Start Date    

 

                    2010                   Inactive                       

       

    

 

                                                            Stool Softener 100 m

g Tab                                   

                    RxNorm: 2584209                   2 Tablet(s) PO BID        

           No Start

Date                   2012                   Inactive                    

              

 

                                                            Calcium with Vitamin

 D 600 mg (1,500 mg)-400 unit Tab       

                             RxNorm: 660384                   1 Tablet(s) PO QD 

                    No Start Date                   2015                  

 

Inactive                                                

 

                                                            iron 325 mg (65 mg i

naun) Tab                                

                    RxNorm: 541294                   1 Tablet(s) PO QD          

         No 

Start Date                   2015                   Inactive              

                                                        

 

                                                            Flonase 50 mcg/actua

tion Nasal Spray                        

                          RxNorm: 694977                   2 Spray NASAL BID    

              

                    No Start Date                   2014                  

 Inactive           

                                                        

 

                                                            fluticasone 50 mcg/a

ctuation nasal spray,suspension         

                           RxNorm: 5592457                   2 Spray NASAL QD to

each nostril                   No Start Date                   2018       

                          Inactive                                   

 

                                                            Nasonex 50 mcg/actua

tion Spray                              

                    RxNorm: 9946255                   2 Spray NASAL QD          

         No 

Start Date                   2018                   Inactive              

                                                        

 

                                                            hydralazine 50 mg ta

blet                                    

                          RxNorm: 219740                   1 Tablet(s) PO as nee

ded for BP over 160/90    

                    No Start Date                   2018                  

 

Inactive                                                

 

                                                            Vimovo 500 mg-20 mg 

12 hr Tab                               

                    RxNorm: 761250                   1 Tablet(s) PO BID         

          No 

Start Date                   2011                   Inactive              

                                                        

 

                                                            Tylenol PM 25 mg-500

 mg/15 mL Oral Soln                     

                    RxNorm: 1782347                   1 PO QPM                  

 No 

Start Date                   2015                   Inactive              

                                                        

 

                                                            Iron (Ferrous Sulfat

e) Oral                                 

                    RxNorm:                    Oral                   No Start D

ate              

                    2012                   Inactive                       

            

 

                                              Reglan 10 mg Tab                  

                   RxNorm:

925199                    1 Tablet(s) PO TID before meals                   No 

Start Date                   2011                   Inactive              

                                                        

 

                                              Xanax 1 mg Tab                    

                 RxNorm: 

355503                    1/2 Tablet(s) PO QD                   No Start Date   

 

                    2018                   Inactive                       

      

     

 

                                              amlodipine 10 mg tablet           

                          

RxNorm: 209954                   1 Tablet(s) PO QD                   No Start 

Date                   2014                   Inactive                    

              

 

                                              Iron (dried) Oral                 

                    

RxNorm:                    Oral                      No Start Date              

   

                    2012                   Inactive                       

            

 

                                                            metoprolol tartrate 

50 mg tablet                            

                    RxNorm: 009021                   1 Tablet(s) PO BID         

          No

Start Date                   12/10/2018                   Inactive              

                                                        

 

                                              Xanax 0.25 mg tablet              

                       

RxNorm: 500326                   1 Tablet(s) PO BID                   No Start 

Date                   10/29/2018                   Inactive                    

              

 

                                              lancets                           

          RxNorm:         

                          Miscellaneous check blood sugar at least once daily   

                

No Start Date                   2013                   Inactive           

                                                        

 

                                              simvastatin 40 mg Tab             

                        

RxNorm: 407184                   1 Tablet(s) PO QD                   No Start 

Date                   2010                   Inactive                    

              

 

                                                            isosorbide mononitra

te ER 30 mg tablet,extended release 24 

hr                                     RxNorm: 802731                   1 

Tablet(s) PO QHS                   No Start Date                   2019   

                          Inactive                                   

 

                                                            amlodipine 2.5 mg ta

blet                                    

                    RxNorm: 709800                   1 Tablet(s) PO QD          

         No Start 

Date                   2014                   Inactive                    

              

 

                                                            sucralfate 1 gram ta

blet                                    

                    RxNorm: 950585                   1 Tablet(s) PO QID         

          No Start 

Date                   2019                   Inactive                    

              

 

                                              Fish Oil Oral                     

                RxNorm:   

                    Oral                   No Start Date                   

2012                   Inactive                                   

 

                                              Multiple Vitamin Oral             

                        

RxNorm:                    Oral                      No Start Date              

   

                    2012                   Inactive                       

            

 

                                                            metoprolol tartrate 

25 mg tablet                            

                    RxNorm: 900534                   1/2 Tablet(s) PO QD        

           

No Start Date                   2017                   Inactive           

                                                        

 

                                                            metoprolol tartrate 

50 mg tablet                            

                    RxNorm: 837365                   1/2 Tablet(s) PO BID       

            

No Start Date                   2019                   Inactive           

                                                        



                                                                                
                                                                                
                                                                                
                                                                                
                                                                                
                                                                                
                                                                                
                                                                                
                                                                                
                                                                                
                                                                                
                                                                                
                                                                                
                                                                                
                                                                                
                                                                                
                                          



Medication Administered

          No Medication Administered data                                       
                            



Immunizations

                      



                Vaccine                   Codes                   Date          

         Status 

              

 

                    Influenza                   CVX: 135                                  

                                        completed                

 

                    Influenza                   CVX: 135                   10/06

/2017               

                                        completed                

 

                    Influenza                   CVX: 135                   10/07

/2016               

                                        completed                

 

                    Influenza                   CVX: 135                   10/16

/2015               

                                        completed                

 

                    Influenza                   CVX: 141                                  

                                        completed                

 

                    Influenza (Adult)                   CVX: 141                

   10/06/2010       

                                        completed                



                                                                                
                                               



Assessments

                      



                    Condition                   Codes                   Effectiv

e Dates             

  

 

                          Urinary tract infection, site not specified           

        ICD-10: N39.0

ICD-9: 599.0                            06/10/2019                

 

                          Other fatigue                   ICD-10: R53.83

ICD-9: 780.79                           06/10/2019                

 

                          Hypoglycemia, unspecified                   ICD-10: E1

6.2

ICD-9: 251.2                            06/10/2019                

 

                          Coronary atherosclerosis due to calcified coronary les

ion                   ICD-

10: I25.84

ICD-9: 414.00                           06/10/2019                

 

                          Essential (primary) hypertension                   ICD

-10: I10

ICD-9: 401.9                            06/10/2019                

 

                          Gastro-esophageal reflux disease without esophagitis  

                 ICD-10: 

K21.9

ICD-9: 530.81                           2019                

 

                          Epigastric pain                   ICD-10: R10.13

ICD-9: 789.06                           2018                

 

                          Generalized anxiety disorder                   ICD-10:

 F41.1

ICD-9: 300.00                           2018                

 

                                        Atherosclerotic heart disease of native 

coronary artery without angina pectoris 

                                        ICD-10: I25.10

ICD-9: 414.00                           2018                

 

                          Palpitations                   ICD-10: R00.2

ICD-9: 785.1                            10/02/2018                

 

                          Occlusion and stenosis of bilateral carotid arteries  

                 ICD-10: 

I65.23

ICD-9: 433.10                           2018                

 

                          Dizziness and giddiness                   ICD-10: R42

ICD-9: 780.4                            2018                

 

                          FLU VACCINE                   ICD-10: Z23

ICD-9: V04.81                           2018                

 

                          Cervicalgia                   ICD-10: M54.2

ICD-9: 723.1                            2018                

 

                          Other spondylosis with radiculopathy, cervical region 

                  ICD-10: 

M47.22

ICD-9: 721.0                            2018                

 

                          Cervicocranial syndrome                   ICD-10: M53.

0

ICD-9: 723.2                            2018                

 

                          Vertigo of central origin, bilateral                  

 ICD-10: H81.43

ICD-9: 386.2                            2018                

 

                          Otalgia, bilateral                   ICD-10: H92.03

ICD-9: 388.70                           2018                

 

                          Benign paroxysmal vertigo, bilateral                  

 ICD-10: H81.13

ICD-9: 386.11                           2018                

 

                          Radiculopathy, lumbosacral region                   IC

D-10: M54.17

ICD-9: 724.4                            2018                

 

                          Low back pain                   ICD-10: M54.5

ICD-9: 724.2                            2018                

 

                          Left lower quadrant pain                   ICD-10: R10

.32

ICD-9: 789.04                           2018                

 

                          Impaired fasting glucose                   ICD-10: R73

.01

ICD-9: 790.29                           2017                

 

                          Mixed hyperlipidemia                   ICD-10: E78.2

ICD-9: 272.4                            2017                

 

                          Other intervertebral disc degeneration, lumbar region 

                  ICD-10: 

M51.36

ICD-9: 722.52                           2017                

 

                          Pain in thoracic spine                   ICD-10: M54.6

ICD-9: 724.1                            2017                

 

                          Mastodynia                   ICD-10: N64.4

ICD-9: 611.71                           2017                

 

                          Acute upper respiratory infection, unspecified        

           ICD-10: J06.9

ICD-9: 465.9                            2017                

 

                          Acute mastoiditis without complications, right ear    

               ICD-10: 

H70.001

ICD-9: 383.00                           2016                

 

                          Constipation, unspecified                   ICD-10: K5

9.00

ICD-9: 564.00                           2016                

 

                          Chronic kidney disease, stage 1                   ICD-

10: N18.1

ICD-9: 585.1                            2016                

 

                          Muscle weakness (generalized)                   ICD-10

: M62.81

ICD-9: 780.79                           2015                

 

                          History of falling                   ICD-10: Z91.81

ICD-9: V15.88                           2015                

 

                    POLYURIA                   ICD-9: 788.42                   0

9/15/2015           

    

 

                    Acute renal failure                   ICD-9: 584.9          

         09/15/2015 

              

 

                    HYPERTENSION                   ICD-9: 401.9                 

  09/10/2015        

       

 

                    Hyperglycemia                   ICD-9: 790.29               

    09/10/2015      

         

 

                    CAD                   ICD-9: 414.00                   09/10/

2015                

 

                    HYPERLIPIDEMIA NEC/NOS                   ICD-9: 272.4       

            

09/10/2015                

 

                    MALAISE AND FATIGUE                   ICD-9: 780.79         

          09/10/2015

               

 

                    HYPOGLYCEMIA                   ICD-9: 251.2                 

  2015        

       

 

                    Grieving                   ICD-9: 309.0                               

   

 

                    ABDOMINAL PAIN                   ICD-9: 789.00              

     2015     

          

 

                    CERUMEN IMPACTION                   ICD-9: 380.4            

       2015   

            

 

                    EUSTACHIAN TUBE DYSFUNCTION                   ICD-9: 381.81 

                  

2015                

 

                    SUPERFIC PHLEBITIS-LEG                   ICD-9: 451.0       

            

2015                

 

                    Leg pain                   ICD-9: 729.5                               

   

 

                    Thoracic back pain                   ICD-9: 724.1           

        2015  

             

 

                    SPASM OF MUSCLE                   ICD-9: 728.85             

      2015    

           

 

                    DIZZINESS/VERTIGO                   ICD-9: 780.4            

       2015   

            

 

                    Suspicious nevus                   ICD-9: 238.2             

      2015    

           

 

                    Benign positional vertigo                   ICD-9: 386.11   

                

2015                

 

                    SINUSITIS, ACUTE                   ICD-9: 461.9             

      2014    

           

 

                    B12 DEFIC ANEMIA NEC                   ICD-9: 281.1         

          2014

               

 

                    COUGH                   ICD-9: 786.2                                  



 

                    URI, ACUTE                   ICD-9: 465.9                   

10/15/2013          

     

 

                    ANXIETY STATE NOS                   ICD-9: 300.00           

        2013  

             

 

                    FLU VACCINE                   ICD-9: V04.81                 

  2013        

       

 

                    CEPHALGIA                   ICD-9: 784.0                   0

2013           

    

 

                    OTITIS MEDIA NOS                   ICD-9: 382.9             

      2013    

           

 

                    Perforation of ear drum                   ICD-9: 384.20     

              

05/10/2013                

 

                    Mastalgia                   ICD-9: 611.71                   

2013          

     

 

                    GERD                   ICD-9: 530.81                                  



 

                    DYSPEPSIA                   ICD-9: 536.8                   1

           

    

 

                    IBS                   ICD-9: 564.1                   10/10/2

012                

 

                    URINARY TRACT INFECTION                   ICD-9: 599.0      

             

2012                

 

                    SPINAL ENTHESOPATHY                   ICD-9: 720.1          

         2012 

              

 

                    SACROILIITIS NEC                   ICD-9: 720.2             

      2012    

           

 

                    Radiculopathy of leg                   ICD-9: 724.4         

          2012

               

 

                    LUMB/LUMBOSAC DISC DEGEN                   ICD-9: 722.52    

               

2012                

 

                    SCIATICA                   ICD-9: 724.3                               

   

 

                    PAIN, LOWER BACK                   ICD-9: 724.2             

      2012    

           

 

                    Sacroiliac dysfunction                   ICD-9: 739.4       

            

2012                

 

                    HYPOTENSION                   ICD-9: 458.9                  

 2012         

      

 

                    PHARYNGITIS, ACUTE                   ICD-9: 462             

      2011    

           

 

                    DEPRESSIVE DISORDER NEC                   ICD-9: 311        

           

2011                

 

                    Heat exhaustion                   ICD-9: 992.5              

     2010     

          

 

                    DIAPHRAGMATIC HERNIA                   ICD-9: 553.3         

          2010

               

 

                    Gastritis                   ICD-9: 535.50                   

2010          

     

 

                    Esophagitis                   ICD-9: 530.10                 

  2010        

       



                                                                    



Reason For Visit

                      



                    Reason For Visit                   Effective Dates          

         Notes      

         

 

                    follow up                   06/10/2019                      

             

 

                    follow up                   2019                   Jelena inman has upcoming 

appointment with Dr Claire and carotid dopplers tomorrow                

 

                    follow up                   2018                   Jelena inman had heart cath 

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                

 

                    follow up                   2018                   [address]

[facility]                

 

                    follow up                   10/02/2018                      

             

 

                    follow up                   2018                      

             

 

                    high blood pressure                   2018            

       Dr Claire has 

ordered holter monitor and hydralazine 50mg PRN                

 

                    dizziness                   2018                   On 

 morning 

patient woke up and went to the bathroom and after lying down became very dizzy.
Patient has hx of vertigo so didn't think anything of it. She usually just has 
them intermittently, but had more that day. While at [facility] began to feel very 
ill and became very shaky. She got home and sat in the recliner and had the 
jittery/nervous feeling. Later that night it finally resolved. Patient feels 
like she had a spell like this around the  and thought it was due to 
extreme heat exhaustion.                

 

                    follow up                   2018                      

             

 

                    back pain                   2018                      

             

 

                    otalgia                   2018                        

           

 

                    back pain                   2018                      

             

 

                    injection(s)                   10/06/2017                   

flu shot            

   

 

                    lab draw                   2017                       

            

 

                    follow up                   2017                      

             

 

                    pelvic pain                   2017                   P

atient states pain 

started in May with a "Constant Ache" to left inguinal area. Patient states at 
that time pain was intermittent. Then, approximately 2nd week  of  pain 
worsened and radiates to left low back area.                

 

                    lab draw                   2017                       

            

 

                    hyperglycemia                   2017                  

                 

 

                    cough                   2017                          

         

 

                    otalgia                   2016                        

           

 

                    injection(s)                   10/07/2016                   

Influenza           

    

 

                    lab draw                   2016                       

            

 

                    constipation                   2016                   

                

 

                    flank pain                   2016                     

              

 

                    follow up                   2015                   3mo

 fwup               



 

                    injection(s)                   10/16/2015                   

Influenza           

    

 

                    acute renal failure                   09/15/2015            

                    

  

 

                    lab draw                   09/10/2015                       

            

 

                    fatigue                   2015                        

           

 

                    otalgia                   2015                        

           

 

                    pain, limb                   2015                     

              

 

                    follow up                   2015                   H[facility]

pital fwup          

     

 

                    high blood pressure                   2015            

                    

  

 

                    injection(s)                   10/17/2014                   

flu shot            

   

 

                    sinusitis                   2014                      

             

 

                    high blood pressure                   2014            

                    

  

 

                    cough                   2014                   Was hig

ht at Dr Claire's 

office so he started he on amlodipine 10mg but seems to be dragging her down. 
Patient decreased to 1/2 tablet this last week                

 

                    lab draw                   2014                       

            

 

                    cough                   2014                          

         

 

                    cough                   10/15/2013                          

         

 

                    high blood pressure                   2013            

                    

  

 

                    follow up                   2013                   ER 

               

 

                    dizziness                   2013                      

             

 

                    follow up                   2013                      

             

 

                    otalgia                   05/10/2013                        

           

 

                    breast complaint                   2013               

                    

 

                    lab draw                   2012                       

            

 

                    follow up                   2012                   2mo

 fwup               



 

                    follow up                   10/23/2012                   2wk

 fwup               



 

                    gas and bloating                   10/10/2012               

    Dr Claire has her

monitoring because was high in his office at last visit                

 

                    injection(s)                   2012                   

                

 

                    dizziness                   2012                      

             

 

                    dizziness                   2012                      

             

 

                    lab draw                   2012                       

            

 

                    muscle weakness                   2012                

   concerns of 

simvastatin with fatigue and muscle weakness                

 

                    leg pain/sciatica                   2012              

                    



 

                    hip pain                   2012                       

            

 

                    back pain                   2012                   has

 tried ice pack, 

muscle relaxers, and moist heat                

 

                    back pain                   2012                      

             

 

                    fatigue                   2012                   in ha

nds/feet            

   

 

                    otalgia                   2011                        

           

 

                    follow up                   2011                   2wk

 fwup               



 

                    back pain                   2011                   thi

nks ulcer may have 

returned                

 

                    lab draw                   2011                       

            

 

                    dizziness                   2011                   Lef

t ear and facial 

pain, right ear pain                 

 

                    follow up                   2011                   1wk

 fwup               



 

                    hip pain                   2011                   feel

s very draggy, 

fatigue, BP 80/63, normally running 100's/60's                

 

                    chills                   2010                         

          

 

                    injection(s)                   10/06/2010                   

flu shot            

   

 

                    follow up                   2010                   6wk

 fwup, had HH 

repaired and is starting to feel better, started on reglan 10mg tid             
  

 

                    follow up                   2010                   hos

p fwup              

 

 

                    follow up                   2010                   1mo

 fwup, saw Dr Claire 

and hasn't gave cardiac clearance yet for HH repair, did have chemical stress 
test yesterday and waiting on results                

 

                    follow up                   2010                   fro

m 

EGD/colonoscopy--has Hiatal Hernia and wants to do repair                

 

                    follow up                   2010                      

             



                                                                              



Results

                      



                    Observation                   Observation Code              

     Item           

                    Item Code                   Result                   Date   

             

 

                    GFR CALC                   [id number]                   GFR Non

 Afr Amr            

                                        48 mL/min                   06/10/2019  

              

 

                    GFR CALC                   [id number]                   GFR Afr

 Amr                

                                        59 mL/min                   06/10/2019  

              

 

                    THYROID STIMULATING HORMONE                   04765         

          TSH       

                                        1.936 uIU/mL                   06/10/201

9         

      

 

                    COMPREHENSIVE METABOLIC                   07579             

      AST           

                                        18 U/L                   06/10/2019     

           

 

                    COMPREHENSIVE METABOLIC                   50915             

      ALT           

                                        11 U/L                   06/10/2019     

           

 

                    COMPREHENSIVE METABOLIC                   81299             

      BUN           

                                        18 mg/dL                   06/10/2019   

             

 

                    COMPREHENSIVE METABOLIC                   19419             

      ALBUMIN       

                                        4.2 g/dL                   06/10/2019   

          

  

 

                    COMPREHENSIVE METABOLIC                   61577             

      CHLORIDE      

                                        109 mmol/L                   06/10/2019 

         

     

 

                    COMPREHENSIVE METABOLIC                   53876             

      Bili Total    

                                        0.4 mg/dL                   06/10/2019  

       

      

 

                    COMPREHENSIVE METABOLIC                   99633             

      ALK PHOS      

                                        64 U/L                   06/10/2019     

         

 

 

                    COMPREHENSIVE METABOLIC                   22020             

      SODIUM        

                                        142 mmol/L                   06/10/2019 

           

   

 

                    COMPREHENSIVE METABOLIC                   04965             

      CREATININE    

                                        1.09 mg/dL                   06/10/2019 

       

       

 

                    COMPREHENSIVE METABOLIC                   13600             

      CALCIUM       

                                        9.3 mg/dL                   06/10/2019  

          

   

 

                    COMPREHENSIVE METABOLIC                   24831             

      POTASSIUM     

                                        4.7 mmol/L                   06/10/2019 

        

      

 

                    COMPREHENSIVE METABOLIC                   26504             

      Total Protein 

                                        6.3 g/dL                   06/10/2019   

    

        

 

                    COMPREHENSIVE METABOLIC                   39010             

      Glucose       

                                        84 mg/dL                   06/10/2019   

          

  

 

                    COMPREHENSIVE METABOLIC                   46840             

      Bicarbonate   

                                        25 mmol/L                   06/10/2019  

      

       

 

                    COMPREHENSIVE METABOLIC                   42945             

      AGAP          

                                        8 mmol/L                   06/10/2019   

             

 

                    COMPLETE BLOOD COUNT                   [id number]              

     WBC            

                                        7.8 10e9/L                   06/10/2019 

               

 

                    COMPLETE BLOOD COUNT                   [id number]              

     RBC            

                                        4.04 10e12/L                   06/10/201

9              

 

 

                    COMPLETE BLOOD COUNT                   [id number]              

     HEMOGLOBIN     

                                        12.2 g/dL                   06/10/2019  

        

     

 

                    COMPLETE BLOOD COUNT                   [id number]              

     HEMATOCRIT     

                                        38.6 %                   06/10/2019     

        

  

 

                    COMPLETE BLOOD COUNT                   [id number]              

     MCV            

                                        95.5 fL                   06/10/2019    

            

 

                    COMPLETE BLOOD COUNT                   [id number]              

     MCH            

                                        30.2 pg                   06/10/2019    

            

 

                    COMPLETE BLOOD COUNT                   [id number]              

     MCHC           

                                        31.6 g/dL                   06/10/2019  

              

 

                    COMPLETE BLOOD COUNT                   [id number]              

     PLATELET COUNT 

                                        215 10e9/L                   06/10/2019 

    

          

 

                    COMPLETE BLOOD COUNT                   [id number]              

     Mean Plt Volume

                                        11.2 fL                   06/10/2019    

   

        

 

                    COMPLETE BLOOD COUNT                   [id number]              

     Neut Auto      

                                        45.7 %                   06/10/2019     

         

 

 

                    COMPLETE BLOOD COUNT                   [id number]              

     Lymph Auto     

                                        42.1 %                   06/10/2019     

        

  

 

                    COMPLETE BLOOD COUNT                   [id number]              

     Mono Auto      

                                        9.2 %                   06/10/2019      

         



 

                    COMPLETE BLOOD COUNT                   [id number]              

     RDW            

                                        13.4 %                   06/10/2019     

           

 

                    COMPLETE BLOOD COUNT                   [id number]              

     Eos Auto       

                                        2.7 %                   06/10/2019      

          

 

                    COMPLETE BLOOD COUNT                   [id number]              

     Baso Auto      

                                        0.3 %                   06/10/2019      

         



 

                    COMPLETE BLOOD COUNT                   [id number]              

     Neutrophil Abs 

                                        3.56 10e9/L                   06/10/2019

    

           

 

                    COMPLETE BLOOD COUNT                   [id number]              

     Lymphocyte Abs 

                                        3.28 10e9/L                   06/10/2019

    

           

 

                    COMPLETE BLOOD COUNT                   [id number]              

     Monocyte Abs   

                                        0.72 10e9/L                   06/10/2019

      

         

 

                    COMPLETE BLOOD COUNT                   [id number]              

     Eosinophil Abs 

                                        0.21 10e9/L                   06/10/2019

    

           

 

                    COMPLETE BLOOD COUNT                   [id number]              

     RDW-SD         

                                        44.9 fL                   06/10/2019    

            

 

                    COMPLETE BLOOD COUNT                   [id number]              

     Basophil Abs   

                                        0.02 10e9/L                   06/10/2019

      

         

 

                    COMPLETE BLOOD COUNT                   [id number]              

     WBC            

                                        5.2 10e9/L                   2018 

               

 

                    COMPLETE BLOOD COUNT                   [id number]              

     RBC            

                                        4.21 10e12/L                   

8              

 

 

                    COMPLETE BLOOD COUNT                   [id number]              

     HEMOGLOBIN     

                                        12.8 g/dL                   2018  

        

     

 

                    COMPLETE BLOOD COUNT                   [id number]              

     HEMATOCRIT     

                                        39.0 %                   2018     

        

  

 

                    COMPLETE BLOOD COUNT                   [id number]              

     MCV            

                                        92.6 fL                   2018    

            

 

                    COMPLETE BLOOD COUNT                   [id number]              

     MCH            

                                        30.4 pg                   2018    

            

 

                    COMPLETE BLOOD COUNT                   [id number]              

     MCHC           

                                        32.8 g/dL                   2018  

              

 

                    COMPLETE BLOOD COUNT                   [id number]              

     PLATELET COUNT 

                                        202 10e9/L                   2018 

    

          

 

                    COMPLETE BLOOD COUNT                   [id number]              

     Mean Plt Volume

                                        10.7 fL                   2018    

   

        

 

                    COMPLETE BLOOD COUNT                   [id number]              

     Neut Auto      

                                        40.9 %                   2018     

         

 

 

                    COMPLETE BLOOD COUNT                   [id number]              

     Lymph Auto     

                                        46.3 %                   2018     

        

  

 

                    COMPLETE BLOOD COUNT                   [id number]              

     Mono Auto      

                                        9.3 %                   2018      

         



 

                    COMPLETE BLOOD COUNT                   [id number]              

     RDW            

                                        13.7 %                   2018     

           

 

                    COMPLETE BLOOD COUNT                   [id number]              

     Eos Auto       

                                        2.9 %                   2018      

          

 

                    COMPLETE BLOOD COUNT                   [id number]              

     Baso Auto      

                                        0.6 %                   2018      

         



 

                    COMPLETE BLOOD COUNT                   [id number]              

     Neutrophil Abs 

                                        2.13 10e9/L                   2018

    

           

 

                    COMPLETE BLOOD COUNT                   [id number]              

     Lymphocyte Abs 

                                        2.41 10e9/L                   2018

    

           

 

                    COMPLETE BLOOD COUNT                   [id number]              

     Monocyte Abs   

                                        0.48 10e9/L                   2018

      

         

 

                    COMPLETE BLOOD COUNT                   [id number]              

     Eosinophil Abs 

                                        0.15 10e9/L                   2018

    

           

 

                    COMPLETE BLOOD COUNT                   [id number]              

     RDW-SD         

                                        45.2 fL                   2018    

            

 

                    COMPLETE BLOOD COUNT                   [id number]              

     Basophil Abs   

                                        0.03 10e9/L                   2018

      

         

 

                    METABOLIC PANEL TOTAL CA                   05455            

       Glucose      

                                        118 mg/dL                   2018  

         

    

 

                    METABOLIC PANEL TOTAL CA                   88125            

       CREATININE   

                                        1.01 mg/dL                   2018 

      

        

 

                    METABOLIC PANEL TOTAL CA                   83870            

       BUN          

                                        14 mg/dL                   2018   

             

 

                    METABOLIC PANEL TOTAL CA                   63913            

       SODIUM       

                                        141 mmol/L                   2018 

          

    

 

                    METABOLIC PANEL TOTAL CA                   02700            

       POTASSIUM    

                                        4.0 mmol/L                   2018 

       

       

 

                    METABOLIC PANEL TOTAL CA                   52168            

       CHLORIDE     

                                        108 mmol/L                   2018 

        

      

 

                    METABOLIC PANEL TOTAL CA                   12788            

       Bicarbonate  

                                        25 mmol/L                   2018  

     

        

 

                    METABOLIC PANEL TOTAL CA                   96400            

       AGAP         

                                        8 mmol/L                   2018   

            



 

                    METABOLIC PANEL TOTAL CA                   94766            

       CALCIUM      

                                        9.6 mg/dL                   2018  

         

    

 

                FREE T4                   82964                   T4 Free       

                

                          1.23 ng/dL                   2018               

 

 

                    GFR CALC                   [id number]                   GFR Non

 Afr Amr            

                                        53 mL/min                   2018  

              

 

                    GFR CALC                   [id number]                   GFR Afr

 Amr                

                                        >60 mL/min                   2018 

               

 

                    THYROID STIMULATING HORMONE                   75308         

          TSH       

                                        2.124 uIU/mL                   

8         

      

 

                    LIPID GROUP                   [id number]                   Choles

terol               

                                        152 mg/dL                   2018  

              

 

                    LIP[employer] GROUP                   [id number]                   Trigly

ceride              

                                        151 mg/dL                   2018  

              

 

                    LIP[employer] GROUP                   [id number]                   HDL CH

OLESTEROL           

                                        47 mg/dL                   2018   

             

 

                    LIP[employer]                   [id number]                   Chol/H

DL Ratio            

                                        3.23 ratio                   2018 

               

 

                    [employer]                   [id number]                   NON-HD

L Chol              

                                        105 mg/dL                   2018  

              

 

                    LIP[employer] GROUP                   [id number]                   LDL Ch

olesterol           

                                        75 mg/dL                   2018   

             

 

                    ASSAY OF TROPONIN QUANT                   47085             

      Troponin-I    

                                        <0.30 ng/mL                   2018

       

        

 

                    COMPREHENSIVE METABOLIC                   74330             

      AST           

                                        20 U/L                   2018     

           

 

                    COMPREHENSIVE METABOLIC                   22123             

      ALT           

                                        14 U/L                   2018     

           

 

                    COMPREHENSIVE METABOLIC                   92264             

      BUN           

                                        19 mg/dL                   2018   

             

 

                    COMPREHENSIVE METABOLIC                   12043             

      ALBUMIN       

                                        4.2 g/dL                   2018   

          

  

 

                    COMPREHENSIVE METABOLIC                   40360             

      CHLORIDE      

                                        102 mmol/L                   2018 

         

     

 

                    COMPREHENSIVE METABOLIC                   19396             

      Bili Total    

                                        0.4 mg/dL                   2018  

       

      

 

                    COMPREHENSIVE METABOLIC                   65150             

      ALK PHOS      

                                        66 U/L                   2018     

         

 

 

                    COMPREHENSIVE METABOLIC                   13435             

      SODIUM        

                                        135 mmol/L                   2018 

           

   

 

                    COMPREHENSIVE METABOLIC                   25799             

      CREATININE    

                                        1.01 mg/dL                   2018 

       

       

 

                    COMPREHENSIVE METABOLIC                   00578             

      CALCIUM       

                                        9.3 mg/dL                   2018  

          

   

 

                    COMPREHENSIVE METABOLIC                   79895             

      POTASSIUM     

                                        4.8 mmol/L                   2018 

        

      

 

                    COMPREHENSIVE METABOLIC                   84706             

      Total Protein 

                                        7.0 g/dL                   2018   

    

        

 

                    COMPREHENSIVE METABOLIC                   70416             

      Glucose       

                                        91 mg/dL                   2018   

          

  

 

                    COMPREHENSIVE METABOLIC                   54593             

      Bicarbonate   

                                        23 mmol/L                   2018  

      

       

 

                    COMPREHENSIVE METABOLIC                   57923             

      AGAP          

                                        10 mmol/L                   2018  

             



 

                    COMPLETE BLOOD COUNT                   [id number]              

     WBC            

                                        7.5 10e9/L                   2018 

               

 

                    COMPLETE BLOOD COUNT                   [id number]              

     RBC            

                                        4.13 10e12/L                   

8              

 

 

                    COMPLETE BLOOD COUNT                   [id number]              

     HEMOGLOBIN     

                                        12.6 g/dL                   2018  

        

     

 

                    COMPLETE BLOOD COUNT                   [id number]              

     HEMATOCRIT     

                                        38.4 %                   2018     

        

  

 

                    COMPLETE BLOOD COUNT                   [id number]              

     MCV            

                                        93.0 fL                   2018    

            

 

                    COMPLETE BLOOD COUNT                   [id number]              

     MCH            

                                        30.5 pg                   2018    

            

 

                    COMPLETE BLOOD COUNT                   1385062              

     MCHC           

                                        32.8 g/dL                   2018  

              

 

                    COMPLETE BLOOD COUNT                   1635110              

     PLATELET COUNT 

                                        204 10e9/L                   2018 

    

          

 

                    COMPLETE BLOOD COUNT                   [id number]              

     Mean Plt Volume

                                        10.9 fL                   2018    

   

        

 

                    COMPLETE BLOOD COUNT                   [id number]              

     Neut Auto      

                                        43.1 %                   2018     

         

 

 

                    COMPLETE BLOOD COUNT                   [id number]              

     Lymph Auto     

                                        45.0 %                   2018     

        

  

 

                    COMPLETE BLOOD COUNT                   [id number]              

     Mono Auto      

                                        9.2 %                   2018      

         



 

                    COMPLETE BLOOD COUNT                   [id number]              

     RDW            

                                        13.4 %                   2018     

           

 

                    COMPLETE BLOOD COUNT                   [id number]              

     Eos Auto       

                                        2.3 %                   2018      

          

 

                    COMPLETE BLOOD COUNT                   [id number]              

     Baso Auto      

                                        0.4 %                   2018      

         



 

                    COMPLETE BLOOD COUNT                   [id number]              

     Neutrophil Abs 

                                        3.23 10e9/L                   2018

    

           

 

                    COMPLETE BLOOD COUNT                   [id number]              

     Lymphocyte Abs 

                                        3.38 10e9/L                   2018

    

           

 

                    COMPLETE BLOOD COUNT                   [id number]              

     Monocyte Abs   

                                        0.69 10e9/L                   2018

      

         

 

                    COMPLETE BLOOD COUNT                   [id number]              

     Eosinophil Abs 

                                        0.17 10e9/L                   2018

    

           

 

                    COMPLETE BLOOD COUNT                   [id number]              

     Basophil Abs   

                                        0.03 10e9/L                   2018

      

         

 

                    COMPLETE BLOOD COUNT                   [id number]              

     RDW-SD         

                                        44.4 fL                   2018    

            

 

                    GFR CALC                   6356635                   GFR Non

 Afr Amr            

                                        53 mL/min                   2018  

              

 

                    GFR CALC                   2589272                   GFR Afr

 Amr                

                                        >60 mL/min                   2018 

               

 

                    GLYCOSYLATED HEMOGLOBIN TEST                   70404        

           Hgb A1c  

                    80044-5                   5.4 %                   2018

    

           

 

                    MEAN GLUC                   [id number]                   Calc M

elisha Gluc            

                                        108 mg/dL                   2018  

              

 

                    MEAN GLUC                   [id number]                   Calc M

elisha Gluc            

                                        114 mg/dL                   2017  

              

 

                    LIPID GROUP                   [id number]                   Choles

terol               

                                        146 mg/dL                   2017  

              

 

                    LIPID GROUP                   [id number]                   Trigly

ceride              

                                        119 mg/dL                   2017  

              

 

                    LIP[employer] GROUP                   [id number]                   HDL CH

OLESTEROL           

                                        47 mg/dL                   2017   

             

 

                    LIP[employer] GROUP                   [id number]                   Chol/H

DL Ratio            

                                        3.11 ratio                   2017 

               

 

                    LIP[employer] GROUP                   [id number]                   NON-HD

L Chol              

                                        99 mg/dL                   2017   

             

 

                    LIPID GROUP                   [id number]                   LDL Ch

olesterol           

                                        75 mg/dL                   2017   

             

 

                    GLYCOSYLATED HEMOGLOBIN TEST                   60782        

           Hgb A1c  

                    78181-3                   5.6 %                   2017

    

           

 

                    COMPREHENSIVE METABOLIC                   03303             

      AST           

                                        22 U/L                   2017     

           

 

                    COMPREHENSIVE METABOLIC                   28428             

      ALT           

                                        12 U/L                   2017     

           

 

                    COMPREHENSIVE METABOLIC                   88035             

      BUN           

                                        17 mg/dL                   2017   

             

 

                    COMPREHENSIVE METABOLIC                   20503             

      ALBUMIN       

                                        4.0 g/dL                   2017   

          

  

 

                    COMPREHENSIVE METABOLIC                   30826             

      CHLORIDE      

                                        110 mmol/L                   2017 

         

     

 

                    COMPREHENSIVE METABOLIC                   16367             

      Bili Total    

                                        0.4 mg/dL                   2017  

       

      

 

                    COMPREHENSIVE METABOLIC                   70793             

      ALK PHOS      

                                        63 U/L                   2017     

         

 

 

                    COMPREHENSIVE METABOLIC                   26869             

      SODIUM        

                                        140 mmol/L                   2017 

           

   

 

                    COMPREHENSIVE METABOLIC                   39493             

      CREATININE    

                                        1.05 mg/dL                   2017 

       

       

 

                    COMPREHENSIVE METABOLIC                   37816             

      CALCIUM       

                                        9.2 mg/dL                   2017  

          

   

 

                    COMPREHENSIVE METABOLIC                   83625             

      POTASSIUM     

                                        4.2 mmol/L                   2017 

        

      

 

                    COMPREHENSIVE METABOLIC                   13052             

      Total Protein 

                                        6.2 g/dL                   2017   

    

        

 

                    COMPREHENSIVE METABOLIC                   37857             

      Glucose       

                                        87 mg/dL                   2017   

          

  

 

                    COMPREHENSIVE METABOLIC                   30103             

      Bicarbonate   

                                        24 mmol/L                   2017  

      

       

 

                    COMPREHENSIVE METABOLIC                   68843             

      AGAP          

                                        6 mmol/L                   2017   

             

 

                    GFR CALC                   3149768                   GFR Non

 Afr Amr            

                                        51 mL/min                   2017  

              

 

                    GFR CALC                   7235652                   GFR Afr

 Amr                

                                        >60 mL/min                   2017 

               

 

                    COMPLETE BLOOD COUNT                   3174717              

     WBC            

                                        6.7 10e9/L                   2017 

               

 

                    COMPLETE BLOOD COUNT                   9271186              

     RBC            

                                        4.04 10e12/L                   

7              

 

 

                    COMPLETE BLOOD COUNT                   0056211              

     HEMOGLOBIN     

                                        12.1 g/dL                   2017  

        

     

 

                    COMPLETE BLOOD COUNT                   7629966              

     HEMATOCRIT     

                                        38.0 %                   2017     

        

  

 

                    COMPLETE BLOOD COUNT                   3046270              

     MCV            

                                        94.1 fL                   2017    

            

 

                    COMPLETE BLOOD COUNT                   7852467              

     MCH            

                                        30.0 pg                   2017    

            

 

                    COMPLETE BLOOD COUNT                   7230184              

     MCHC           

                                        31.8 g/dL                   2017  

              

 

                    COMPLETE BLOOD COUNT                   0475176              

     PLATELET COUNT 

                                        206 10e9/L                   2017 

    

          

 

                    COMPLETE BLOOD COUNT                   [id number]              

     Mean Plt Volume

                                        11.3 fL                   2017    

   

        

 

                    COMPLETE BLOOD COUNT                   2568829              

     Neut Auto      

                                        35.8 %                   2017     

         

 

 

                    COMPLETE BLOOD COUNT                   [id number]              

     Lymph Auto     

                                        51.6 %                   2017     

        

  

 

                    COMPLETE BLOOD COUNT                   [id number]              

     Mono Auto      

                                        8.8 %                   2017      

         



 

                    COMPLETE BLOOD COUNT                   [id number]              

     RDW            

                                        13.5 %                   2017     

           

 

                    COMPLETE BLOOD COUNT                   [id number]              

     Eos Auto       

                                        3.4 %                   2017      

          

 

                    COMPLETE BLOOD COUNT                   5337230              

     Baso Auto      

                                        0.4 %                   2017      

         



 

                    COMPLETE BLOOD COUNT                   [id number]              

     Neutrophil Abs 

                                        2.40 10e9/L                   2017

    

           

 

                    COMPLETE BLOOD COUNT                   [id number]              

     Lymphocyte Abs 

                                        3.46 10e9/L                   2017

    

           

 

                    COMPLETE BLOOD COUNT                   [id number]              

     Monocyte Abs   

                                        0.59 10e9/L                   2017

      

         

 

                    COMPLETE BLOOD COUNT                   [id number]              

     Eosinophil Abs 

                                        0.23 10e9/L                   2017

    

           

 

                    COMPLETE BLOOD COUNT                   [id number]              

     RDW-SD         

                                        45.3 fL                   2017    

            

 

                    COMPLETE BLOOD COUNT                   [id number]              

     Basophil Abs   

                                        0.03 10e9/L                   2017

      

         

 

                    THYROID STIMULATING HORMONE                   23918         

          TSH       

                                        1.981 uIU/mL                   

7         

      

 

                    COMPLETE BLOOD COUNT                   [id number]              

     WBC            

                                        6.0 10e9/L                   2017 

               

 

                    COMPLETE BLOOD COUNT                   4084751              

     RBC            

                                        4.29 10e12/L                   

7              

 

 

                    COMPLETE BLOOD COUNT                   [id number]              

     HEMOGLOBIN     

                                        12.9 g/dL                   2017  

        

     

 

                    COMPLETE BLOOD COUNT                   9332541              

     HEMATOCRIT     

                                        38.4 %                   2017     

        

  

 

                    COMPLETE BLOOD COUNT                   [id number]              

     MCV            

                                        89.5 fL                   2017    

            

 

                    COMPLETE BLOOD COUNT                   [id number]              

     MCH            

                                        30.1 pg                   2017    

            

 

                    COMPLETE BLOOD COUNT                   [id number]              

     MCHC           

                                        33.6 g/dL                   2017  

              

 

                    COMPLETE BLOOD COUNT                   [id number]              

     PLATELET COUNT 

                                        181 10e9/L                   2017 

    

          

 

                    COMPLETE BLOOD COUNT                   [id number]              

     Mean Plt Volume

                                        11.7 fL                   2017    

   

        

 

                    COMPLETE BLOOD COUNT                   [id number]              

     Neut Auto      

                                        36.9 %                   2017     

         

 

 

                    COMPLETE BLOOD COUNT                   [id number]              

     Lymph Auto     

                                        50.4 %                   2017     

        

  

 

                    COMPLETE BLOOD COUNT                   [id number]              

     Mono Auto      

                                        9.0 %                   2017      

         



 

                    COMPLETE BLOOD COUNT                   [id number]              

     RDW            

                                        13.7 %                   2017     

           

 

                    COMPLETE BLOOD COUNT                   [id number]              

     Eos Auto       

                                        3.4 %                   2017      

          

 

                    COMPLETE BLOOD COUNT                   [id number]              

     Baso Auto      

                                        0.3 %                   2017      

         



 

                    COMPLETE BLOOD COUNT                   [id number]              

     Neutrophil Abs 

                                        2.21 10e9/L                   2017

    

           

 

                    COMPLETE BLOOD COUNT                   [id number]              

     Lymphocyte Abs 

                                        3.02 10e9/L                   2017

    

           

 

                    COMPLETE BLOOD COUNT                   [id number]              

     Monocyte Abs   

                                        0.54 10e9/L                   2017

      

         

 

                    COMPLETE BLOOD COUNT                   [id number]              

     Eosinophil Abs 

                                        0.20 10e9/L                   2017

    

           

 

                    COMPLETE BLOOD COUNT                   [id number]              

     RDW-SD         

                                        44.0 fL                   2017    

            

 

                    COMPLETE BLOOD COUNT                   [id number]              

     Basophil Abs   

                                        0.02 10e9/L                   2017

      

         

 

                    GLYCOSYLATED HEMOGLOBIN TEST                   23028        

           Hgb A1c  

                    22682-2                   5.4 %                   2017

    

           

 

                    THYROID STIMULATING HORMONE                   70999         

          TSH       

                                        2.200 uIU/mL                   

7         

      

 

                    GFR CALC                   [id number]                   GFR Non

 Afr Amr            

                                        50 mL/min                   2017  

              

 

                    GFR CALC                   7040048                   GFR Afr

 Amr                

                                        >60 mL/min                   2017 

               

 

                    MEAN GLUC                   [id number]                   Calc M

elisha Gluc            

                                        108 mg/dL                   2017  

              

 

                    COMPREHENSIVE METABOLIC                   74834             

      AST           

                                        18 U/L                   2017     

           

 

                    COMPREHENSIVE METABOLIC                   64834             

      ALT           

                                        10 U/L                   2017     

           

 

                    COMPREHENSIVE METABOLIC                   84511             

      BUN           

                                        20 mg/dL                   2017   

             

 

                    COMPREHENSIVE METABOLIC                   10285             

      ALBUMIN       

                                        4.1 g/dL                   2017   

          

  

 

                    COMPREHENSIVE METABOLIC                   40856             

      CHLORIDE      

                                        109 mmol/L                   2017 

         

     

 

                    COMPREHENSIVE METABOLIC                   25122             

      Bili Total    

                                        0.6 mg/dL                   2017  

       

      

 

                    COMPREHENSIVE METABOLIC                   32515             

      ALK PHOS      

                                        64 U/L                   2017     

         

 

 

                    COMPREHENSIVE METABOLIC                   11565             

      SODIUM        

                                        141 mmol/L                   2017 

           

   

 

                    COMPREHENSIVE METABOLIC                   13417             

      CREATININE    

                                        1.06 mg/dL                   2017 

       

       

 

                    COMPREHENSIVE METABOLIC                   22046             

      CALCIUM       

                                        9.9 mg/dL                   2017  

          

   

 

                    COMPREHENSIVE METABOLIC                   22659             

      POTASSIUM     

                                        4.2 mmol/L                   2017 

        

      

 

                    COMPREHENSIVE METABOLIC                   05528             

      Total Protein 

                                        6.3 g/dL                   2017   

    

        

 

                    COMPREHENSIVE METABOLIC                   62736             

      Glucose       

                                        99 mg/dL                   2017   

          

  

 

                    COMPREHENSIVE METABOLIC                   37028             

      Bicarbonate   

                                        21 mmol/L                   2017  

      

       

 

                    COMPREHENSIVE METABOLIC                   65596             

      AGAP          

                                        11 mmol/L                   2017  

             



 

                    LIPID GROUP                   [id number]                   Choles

terol               

                                        169 mg/dL                   2016  

              

 

                    LIPID GROUP                   [id number]                   Trigly

ceride              

                                        165 mg/dL                   2016  

              

 

                    LIPID GROUP                   [id number]                   HDL CH

OLESTEROL           

                                        43 mg/dL                   2016   

             

 

                    LIPID GROUP                   [id number]                   Chol/H

DL Ratio            

                                        3.93 ratio                   2016 

               

 

                    LIPID GROUP                   [id number]                   NON-HD

L Chol              

                                        126 mg/dL                   2016  

              

 

                    LIPID GROUP                   [id number]                   LDL Ch

olesterol           

                                        93 mg/dL                   2016   

             

 

                    COMPREHENSIVE METABOLIC                   46386             

      AST           

                                        18 U/L                   2016     

           

 

                    COMPREHENSIVE METABOLIC                   76814             

      ALT           

                                        10 U/L                   2016     

           

 

                    COMPREHENSIVE METABOLIC                   66435             

      BUN           

                                        20 mg/dL                   2016   

             

 

                    COMPREHENSIVE METABOLIC                   66020             

      ALBUMIN       

                                        3.9 g/dL                   2016   

          

  

 

                    COMPREHENSIVE METABOLIC                   11833             

      CHLORIDE      

                                        110 mmol/L                   2016 

         

     

 

                    COMPREHENSIVE METABOLIC                   57376             

      Bili Total    

                                        0.5 mg/dL                   2016  

       

      

 

                    COMPREHENSIVE METABOLIC                   47935             

      ALK PHOS      

                                        72 U/L                   2016     

         

 

 

                    COMPREHENSIVE METABOLIC                   68501             

      SODIUM        

                                        141 mmol/L                   2016 

           

   

 

                    COMPREHENSIVE METABOLIC                   19655             

      CREATININE    

                                        1.12 mg/dL                   2016 

       

       

 

                    COMPREHENSIVE METABOLIC                   20203             

      CALCIUM       

                                        9.7 mg/dL                   2016  

          

   

 

                    COMPREHENSIVE METABOLIC                   98661             

      POTASSIUM     

                                        4.4 mmol/L                   2016 

        

      

 

                    COMPREHENSIVE METABOLIC                   81700             

      Total Protein 

                                        6.2 g/dL                   2016   

    

        

 

                    COMPREHENSIVE METABOLIC                   42398             

      Glucose       

                                        90 mg/dL                   2016   

          

  

 

                    COMPREHENSIVE METABOLIC                   66233             

      Bicarbonate   

                                        23 mmol/L                   2016  

      

       

 

                    COMPREHENSIVE METABOLIC                   97222             

      AGAP          

                                        8 mmol/L                   2016   

             

 

                    GFR CALC                   0756859                   GFR Non

 Afr Amr            

                                        47 mL/min                   2016  

              

 

                    GFR CALC                   8305398                   GFR Afr

 Amr                

                                        57 mL/min                   2016  

              

 

                    GLYCOSYLATED HEMOGLOBIN TEST                   81872        

           Hgb A1c  

                    67918-5                   5.5 %                   2016

    

           

 

                    THYROID STIMULATING HORMONE                   18916         

          TSH       

                                        2.537 uIU/mL                   

6         

      

 

                FREE T4                   10469                   T4 Free       

                

                          1.36 ng/dL                   2016               

 

 

                    COMPLETE BLOOD COUNT                   4081801              

     WBC            

                                        6.8 10e9/L                   2016 

               

 

                    COMPLETE BLOOD COUNT                   9780619              

     RBC            

                                        4.20 10e12/L                   

6              

 

 

                    COMPLETE BLOOD COUNT                   5910808              

     HEMOGLOBIN     

                                        12.5 g/dL                   2016  

        

     

 

                    COMPLETE BLOOD COUNT                   8533568              

     HEMATOCRIT     

                                        38.0 %                   2016     

        

  

 

                    COMPLETE BLOOD COUNT                   7720080              

     MCV            

                                        90.5 fL                   2016    

            

 

                    COMPLETE BLOOD COUNT                   8777187              

     MCH            

                                        29.8 pg                   2016    

            

 

                    COMPLETE BLOOD COUNT                   6988960              

     MCHC           

                                        32.9 g/dL                   2016  

              

 

                    COMPLETE BLOOD COUNT                   2950922              

     PLATELET COUNT 

                                        197 10e9/L                   2016 

    

          

 

                    COMPLETE BLOOD COUNT                   0401176              

     Mean Plt Volume

                                        11.7 fL                   2016    

   

        

 

                    COMPLETE BLOOD COUNT                   6096896              

     Neut Auto      

                                        41.3 %                   2016     

         

 

 

                    COMPLETE BLOOD COUNT                   3309922              

     Lymph Auto     

                                        47.1 %                   2016     

        

  

 

                    COMPLETE BLOOD COUNT                   6396744              

     Mono Auto      

                                        7.8 %                   2016      

         



 

                    COMPLETE BLOOD COUNT                   3169319              

     RDW            

                                        13.8 %                   2016     

           

 

                    COMPLETE BLOOD COUNT                   6844824              

     Eos Auto       

                                        3.4 %                   2016      

          

 

                    COMPLETE BLOOD COUNT                   0242868              

     Baso Auto      

                                        0.4 %                   2016      

         



 

                    COMPLETE BLOOD COUNT                   7174462              

     Neutrophil Abs 

                                        2.81 10e9/L                   2016

    

           

 

                    COMPLETE BLOOD COUNT                   4985104              

     Lymphocyte Abs 

                                        3.20 10e9/L                   2016

    

           

 

                    COMPLETE BLOOD COUNT                   6224532              

     Monocyte Abs   

                                        0.53 10e9/L                   2016

      

         

 

                    COMPLETE BLOOD COUNT                   8644609              

     Eosinophil Abs 

                                        0.23 10e9/L                   2016

    

           

 

                    COMPLETE BLOOD COUNT                   3562569              

     RDW-SD         

                                        44.4 fL                   2016    

            

 

                    COMPLETE BLOOD COUNT                   1171247              

     Basophil Abs   

                                        0.03 10e9/L                   2016

      

         

 

                    MEAN GLUC                   5058859                   Calc M

elisha Gluc            

                                        111 mg/dL                   2016  

              

 

                    METABOLIC PANEL TOTAL CA                   04923            

       Glucose      

                                        89 MG/DL                   2015   

         

   

 

                    METABOLIC PANEL TOTAL CA                   70480            

       CREATININE   

                                        1.12 MG/DL                   2015 

      

        

 

                    METABOLIC PANEL TOTAL CA                   39682            

       BUN          

                                        20 MG/DL                   2015   

             

 

                    METABOLIC PANEL TOTAL CA                   12527            

       SODIUM       

                                        139 MMOL/L                   2015 

          

    

 

                    METABOLIC PANEL TOTAL CA                   09513            

       POTASSIUM    

                                        4.6 MMOL/L                   2015 

       

       

 

                    METABOLIC PANEL TOTAL CA                   74994            

       CHLORIDE     

                                        108 MMOL/L                   2015 

        

      

 

                    METABOLIC PANEL TOTAL CA                   79997            

       BICARB       

                                        26 MMOL/L                   2015  

          

   

 

                    METABOLIC PANEL TOTAL CA                   53744            

       ANION GAP    

                                        5 MEQ/L                   2015    

       

    

 

                    METABOLIC PANEL TOTAL CA                   20533            

       CALCIUM      

                                        10.0 MG/DL                   2015 

         

     

 

                GFR CALC                   7882983                   GFR AA     

                

                          57.0L ML/MIN                   2015             

   

 

                    GFR CALC                   5018146                   GFR NON

-AA                 

                                        47.0L ML/MIN                   

5                

 

                    THYROID STIMULATING HORMONE                   25611         

          TSH       

                                        2.378 uIU/ML                   09/10/201

5         

      

 

                    COMPLETE BLOOD COUNT                   2578866              

     WBC            

                                        6.4 10e9/L                   09/10/2015 

               

 

                    COMPLETE BLOOD COUNT                   6865273              

     RBC            

                                        3.99 10e12/L                   09/10/201

5              

 

 

                    COMPLETE BLOOD COUNT                   0483714              

     HGB            

                                        11.9 g/dL                   09/10/2015  

              

 

                    COMPLETE BLOOD COUNT                   6335645              

     HCT DET        

                                        36.9 %                   09/10/2015     

           

 

                    COMPLETE BLOOD COUNT                   1208828              

     MCV            

                                        92.5 fL                   09/10/2015    

            

 

                    COMPLETE BLOOD COUNT                   8979543              

     MCH            

                                        29.8 pg                   09/10/2015    

            

 

                    COMPLETE BLOOD COUNT                   0793224              

     MCHC           

                                        32.2 g/dL                   09/10/2015  

              

 

                    COMPLETE BLOOD COUNT                   4587470              

     PLT            

                                        172 10e9/L                   09/10/2015 

               

 

                    COMPLETE BLOOD COUNT                   5507555              

     MPV            

                                        11.7 fL                   09/10/2015    

            

 

                    COMPLETE BLOOD COUNT                   5224642              

     ARIK %          

                                        40.4 %                   09/10/2015     

           

 

                    COMPLETE BLOOD COUNT                   2514638              

     LY %           

                                        48.0 %                   09/10/2015     

           

 

                    COMPLETE BLOOD COUNT                   6971306              

     MON %          

                                        8.3 %                   09/10/2015      

          

 

                    COMPLETE BLOOD COUNT                   7029314              

     EOS  %         

                                        2.8 %                   09/10/2015      

          

 

                    COMPLETE BLOOD COUNT                   9059590              

     BASO %         

                                        0.5 %                   09/10/2015      

          

 

                    COMPLETE BLOOD COUNT                   8685296              

     RDW            

                                        13.6 %                   09/10/2015     

           

 

                    COMPLETE BLOOD COUNT                   3102824              

     ABS ARIK        

                                        2.59 10e9/L                   09/10/2015

           

    

 

                    COMPLETE BLOOD COUNT                   [id number]              

     ABS LYMPH      

                                        3.07 10e9/L                   09/10/2015

         

      

 

                    COMPLETE BLOOD COUNT                   [id number]              

     ABS MONO       

                                        0.53 10e9/L                   09/10/2015

          

     

 

                    COMPLETE BLOOD COUNT                   [id number]              

     ABS EOS        

                                        0.18 10e9/L                   09/10/2015

           

    

 

                    COMPLETE BLOOD COUNT                   6222331              

     ABS BASO       

                                        0.03 10e9/L                   09/10/2015

          

     

 

                    COMPLETE BLOOD COUNT                   [id number]              

     RDW-SD         

                                        44.9 fL                   09/10/2015    

            

 

                    LIPID GROUP                   [id number]                   HDL TE

ST                  

                                        42 MG/DL                   09/10/2015   

             

 

                LIPID GROUP                   [id number]                   TRIG      

                

                          177 MG/DL                   09/10/2015                

 

                    LIPID GROUP                   [id number]                   TEST L

DL                  

                                        72 MG/DL                   09/10/2015   

             

 

                LIPID GROUP                   [id number]                   CHOL      

                

                          149 MG/DL                   09/10/2015                

 

                    LIPID GROUP                   [id number]                   RCHOL/

HDL                 

                                        3.55 RATIO                   09/10/2015 

               

 

                    LIPID GROUP                   [id number]                   NON-HD

L CH                

                                        107 MG/DL                   09/10/2015  

              

 

                    GLYCOSYLATED HEMOGLOBIN TEST                   69886        

           A1C HPLC 

                    44219-4                   5.5 %                   09/10/2015

   

            

 

                FREE T4                   55212                   FREE T4       

                

                          1.39 NG/DL                   09/10/2015               

 

 

                GFR CALC                   7452253                   GFR AA     

                

                          55.0L ML/MIN                   09/10/2015             

   

 

                    GFR CALC                   6165421                   GFR NON

-AA                 

                                        46.0L ML/MIN                   09/10/201

5                

 

                    COMPREHENSIVE METABOLIC                   98346             

      AST           

                                        17 U/L                   09/10/2015     

           

 

                    COMPREHENSIVE METABOLIC                   34282             

      ALT           

                                        10 IU/L                   09/10/2015    

            

 

                    COMPREHENSIVE METABOLIC                   10174             

      BUN           

                                        20 MG/DL                   09/10/2015   

             

 

                    COMPREHENSIVE METABOLIC                   89570             

      ALBUMIN       

                                        3.9 GM/DL                   09/10/2015  

          

   

 

                    COMPREHENSIVE METABOLIC                   43713             

      CHLORIDE      

                                        111 MMOL/L                   09/10/2015 

         

     

 

                    COMPREHENSIVE METABOLIC                   02629             

      BILI TOT      

                                        0.4 MG/DL                   09/10/2015  

         

    

 

                    COMPREHENSIVE METABOLIC                   29849             

      ALK PHOS      

                                        70 U/L                   09/10/2015     

         

 

 

                    COMPREHENSIVE METABOLIC                   72539             

      SODIUM        

                                        142 MMOL/L                   09/10/2015 

           

   

 

                    COMPREHENSIVE METABOLIC                   07590             

      CREATININE    

                                        1.16 MG/DL                   09/10/2015 

       

       

 

                    COMPREHENSIVE METABOLIC                   05580             

      CALCIUM       

                                        9.4 MG/DL                   09/10/2015  

          

   

 

                    COMPREHENSIVE METABOLIC                   03423             

      POTASSIUM     

                                        4.6 MMOL/L                   09/10/2015 

        

      

 

                    COMPREHENSIVE METABOLIC                   52792             

      PROT TOT      

                                        6.2 GM/DL                   09/10/2015  

         

    

 

                    COMPREHENSIVE METABOLIC                   42237             

      Glucose       

                                        90 MG/DL                   09/10/2015   

          

  

 

                    COMPREHENSIVE METABOLIC                   12098             

      BICARB        

                                        24 MMOL/L                   09/10/2015  

           

  

 

                    COMPREHENSIVE METABOLIC                   10883             

      ANION GAP     

                                        7 MEQ/L                   09/10/2015    

        

   

 

                    THYROID STIMULATING HORMONE                   94291         

          TSH       

                                        2.427 uIU/ML                   

5         

      

 

                    LIPID GROUP                   [id number]                   HDL TE

ST                  

                                        47 MG/DL                   2015   

             

 

                LIPID GROUP                   [id number]                   TRIG      

                

                          145 MG/DL                   2015                

 

                    LIPID GROUP                   [id number]                   TEST L

DL                  

                                        73 MG/DL                   2015   

             

 

                LIPID GROUP                   [id number]                   CHOL      

                

                          149 MG/DL                   2015                

 

                    LIPID GROUP                   [id number]                   RCHOL/

HDL                 

                                        3.17 RATIO                   2015 

               

 

                    LIPID GROUP                   [id number]                   NON-HD

L CH                

                                        102 MG/DL                   2015  

              

 

                    COMPREHENSIVE METABOLIC                   45164             

      AST           

                                        17 U/L                   2015     

           

 

                    COMPREHENSIVE METABOLIC                   28763             

      ALT           

                                        9 IU/L                   2015     

           

 

                    COMPREHENSIVE METABOLIC                   98230             

      BUN           

                                        19 MG/DL                   2015   

             

 

                    COMPREHENSIVE METABOLIC                   21767             

      ALBUMIN       

                                        4.3 GM/DL                   2015  

          

   

 

                    COMPREHENSIVE METABOLIC                   67331             

      CHLORIDE      

                                        108 MMOL/L                   2015 

         

     

 

                    COMPREHENSIVE METABOLIC                   48929             

      BILI TOT      

                                        0.5 MG/DL                   2015  

         

    

 

                    COMPREHENSIVE METABOLIC                   00717             

      ALK PHOS      

                                        68 U/L                   2015     

         

 

 

                    COMPREHENSIVE METABOLIC                   85846             

      SODIUM        

                                        140 MMOL/L                   2015 

           

   

 

                    COMPREHENSIVE METABOLIC                   01981             

      CREATININE    

                                        1.08 MG/DL                   2015 

       

       

 

                    COMPREHENSIVE METABOLIC                   91252             

      CALCIUM       

                                        9.9 MG/DL                   2015  

          

   

 

                    COMPREHENSIVE METABOLIC                   67377             

      POTASSIUM     

                                        4.3 MMOL/L                   2015 

        

      

 

                    COMPREHENSIVE METABOLIC                   90549             

      PROT TOT      

                                        7.2 GM/DL                   2015  

         

    

 

                    COMPREHENSIVE METABOLIC                   13519             

      Glucose       

                                        94 MG/DL                   2015   

          

  

 

                    COMPREHENSIVE METABOLIC                   94774             

      BICARB        

                                        26 MMOL/L                   2015  

           

  

 

                    COMPREHENSIVE METABOLIC                   57776             

      ANION GAP     

                                        6 MEQ/L                   2015    

        

   

 

                GFR CALC                   [id number]                   GFR AA     

                

                          60.0L ML/MIN                   2015             

   

 

                    GFR CALC                   4184478                   GFR NON

-AA                 

                                        49.0L ML/MIN                   

5                

 

                    GLYCOSYLATED HEMOGLOBIN TEST                   63335        

           A1C HPLC 

                    03308-6                   5.6 %                   2015

   

            

 

                    COMPLETE BLOOD COUNT                   [id number]              

     WBC            

                                        7.2 10e9/L                   2015 

               

 

                    COMPLETE BLOOD COUNT                   [id number]              

     RBC            

                                        4.28 10e12/L                   

5              

 

 

                    COMPLETE BLOOD COUNT                   [id number]              

     HGB            

                                        12.8 g/dL                   2015  

              

 

                    COMPLETE BLOOD COUNT                   2623386              

     HCT DET        

                                        39.3 %                   2015     

           

 

                    COMPLETE BLOOD COUNT                   8660053              

     MCV            

                                        91.8 fL                   2015    

            

 

                    COMPLETE BLOOD COUNT                   [id number]              

     MCH            

                                        29.9 pg                   2015    

            

 

                    COMPLETE BLOOD COUNT                   [id number]              

     MCHC           

                                        32.6 g/dL                   2015  

              

 

                    COMPLETE BLOOD COUNT                   [id number]              

     PLT            

                                        189 10e9/L                   2015 

               

 

                    COMPLETE BLOOD COUNT                   [id number]              

     MPV            

                                        11.2 fL                   2015    

            

 

                    COMPLETE BLOOD COUNT                   7387774              

     ARIK %          

                                        38.0 %                   2015     

           

 

                    COMPLETE BLOOD COUNT                   2580400              

     LY %           

                                        51.0 %                   2015     

           

 

                    COMPLETE BLOOD COUNT                   8899255              

     MON %          

                                        7.7 %                   2015      

          

 

                    COMPLETE BLOOD COUNT                   3795478              

     EOS  %         

                                        2.9 %                   2015      

          

 

                    COMPLETE BLOOD COUNT                   [id number]              

     BASO %         

                                        0.4 %                   2015      

          

 

                    COMPLETE BLOOD COUNT                   2378167              

     RDW            

                                        14.0 %                   2015     

           

 

                    COMPLETE BLOOD COUNT                   7221989              

     ABS ARIK        

                                        2.74 10e9/L                   2015

           

    

 

                    COMPLETE BLOOD COUNT                   4735112              

     ABS LYMPH      

                                        3.67 10e9/L                   2015

         

      

 

                    COMPLETE BLOOD COUNT                   5572541              

     ABS MONO       

                                        0.55 10e9/L                   2015

          

     

 

                    COMPLETE BLOOD COUNT                   6157941              

     ABS EOS        

                                        0.21 10e9/L                   2015

           

    

 

                    COMPLETE BLOOD COUNT                   3111441              

     ABS BASO       

                                        0.03 10e9/L                   2015

          

     

 

                    COMPLETE BLOOD COUNT                   [id number]              

     RDW-SD         

                                        46.1 fL                   2015    

            

 

                FREE T4                   90531                   FREE T4       

                

                          1.14 NG/DL                   2015               

 

 

                    GLYCOSYLATED HEMOGLOBIN TEST                   04930        

           A1C HPLC 

                    55497-9                   5.2 %                   2014

   

            

 

                FREE T4                   42692                   FREE T4       

                

                          1.40 NG/DL                   2014               

 

 

                GFR CALC                   3895321                   GFR AA     

                

                          >60 ML/MIN                   2014               

 

 

                    GFR CALC                   2379289                   GFR NON

-AA                 

                                        52.0L ML/MIN                   

4                

 

                    COMPREHENSIVE METABOLIC                   90783             

      AST           

                                        15 U/L                   2014     

           

 

                    COMPREHENSIVE METABOLIC                   30184             

      ALT           

                                        9 IU/L                   2014     

           

 

                    COMPREHENSIVE METABOLIC                   59273             

      BUN           

                                        17 MG/DL                   2014   

             

 

                    COMPREHENSIVE METABOLIC                   30475             

      ALBUMIN       

                                        4.0 GM/DL                   2014  

          

   

 

                    COMPREHENSIVE METABOLIC                   67028             

      CHLORIDE      

                                        112 MMOL/L                   2014 

         

     

 

                    COMPREHENSIVE METABOLIC                   47757             

      BILI TOT      

                                        0.5 MG/DL                   2014  

         

    

 

                    COMPREHENSIVE METABOLIC                   19061             

      ALK PHOS      

                                        66 U/L                   2014     

         

 

 

                    COMPREHENSIVE METABOLIC                   91241             

      SODIUM        

                                        140 MMOL/L                   2014 

           

   

 

                    COMPREHENSIVE METABOLIC                   16725             

      CREATININE    

                                        1.03 MG/DL                   2014 

       

       

 

                    COMPREHENSIVE METABOLIC                   71641             

      CALCIUM       

                                        9.5 MG/DL                   2014  

          

   

 

                    COMPREHENSIVE METABOLIC                   18376             

      POTASSIUM     

                                        4.1 MMOL/L                   2014 

        

      

 

                    COMPREHENSIVE METABOLIC                   49272             

      PROT TOT      

                                        6.2 GM/DL                   2014  

         

    

 

                    COMPREHENSIVE METABOLIC                   36323             

      Glucose       

                                        102 MG/DL                   2014  

          

   

 

                    COMPREHENSIVE METABOLIC                   24443             

      BICARB        

                                        23 MMOL/L                   2014  

           

  

 

                    COMPREHENSIVE METABOLIC                   45789             

      ANION GAP     

                                        5 MEQ/L                   2014    

        

   

 

                    THYROID STIMULATING HORMONE                   78578         

          TSH       

                                        2.074 uIU/ML                   

4         

      

 

                    VITAMIN B 12 FOLIC ACID                   [id number]       

            VIT B 12

                                        423 PG/ML                   2014  

   

          

 

                    VITAMIN B 12 FOLIC ACID                   [id number]       

            FOLIC 

ACID                                       19.7 NG/ML                   

2014                

 

                    LIPID GROUP                   [id number]                   HDL TE

ST                  

                                        40 MG/DL                   2014   

             

 

                LIPID GROUP                   [id number]                   TRIG      

                

                          145 MG/DL                   2014                

 

                    LIPID GROUP                   [id number]                   TEST L

DL                  

                                        81 MG/DL                   2014   

             

 

                LIPID GROUP                   [id number]                   CHOL      

                

                          150 MG/DL                   2014                

 

                    LIPID GROUP                   [id number]                   RCHOL/

HDL                 

                                        3.75 RATIO                   2014 

               

 

                    COMPLETE BLOOD COUNT                   [id number]              

     WBC            

                                        6.0 10e9/L                   2014 

               

 

                    COMPLETE BLOOD COUNT                   4768512              

     RBC            

                                        4.26 10e12/L                   

4              

 

 

                    COMPLETE BLOOD COUNT                   4668201              

     HGB            

                                        12.7 g/dL                   2014  

              

 

                    COMPLETE BLOOD COUNT                   5365925              

     HCT DET        

                                        38.7 %                   2014     

           

 

                    COMPLETE BLOOD COUNT                   0066315              

     MCV            

                                        90.8 fL                   2014    

            

 

                    COMPLETE BLOOD COUNT                   3785332              

     MCH            

                                        29.8 pg                   2014    

            

 

                    COMPLETE BLOOD COUNT                   [id number]              

     MCHC           

                                        32.8 g/dL                   2014  

              

 

                    COMPLETE BLOOD COUNT                   3749762              

     PLT            

                                        178 10e9/L                   2014 

               

 

                    COMPLETE BLOOD COUNT                   7883465              

     MPV            

                                        11.7 fL                   2014    

            

 

                    COMPLETE BLOOD COUNT                   4879450              

     ARIK %          

                                        30.5 %                   2014     

           

 

                    COMPLETE BLOOD COUNT                   [id number]              

     LY %           

                                        55.4 %                   2014     

           

 

                    COMPLETE BLOOD COUNT                   1109804              

     MON %          

                                        9.0 %                   2014      

          

 

                    COMPLETE BLOOD COUNT                   3521057              

     EOS  %         

                                        4.4 %                   2014      

          

 

                    COMPLETE BLOOD COUNT                   9735686              

     BASO %         

                                        0.7 %                   2014      

          

 

                    COMPLETE BLOOD COUNT                   7106041              

     RDW            

                                        13.3 %                   2014     

           

 

                    COMPLETE BLOOD COUNT                   9054171              

     ABS ARIK        

                                        1.83 10e9/L                   2014

           

    

 

                    COMPLETE BLOOD COUNT                   7609623              

     ABS LYMPH      

                                        3.32 10e9/L                   2014

         

      

 

                    COMPLETE BLOOD COUNT                   [id number]              

     ABS MONO       

                                        0.54 10e9/L                   2014

          

     

 

                    COMPLETE BLOOD COUNT                   8040104              

     ABS EOS        

                                        0.26 10e9/L                   2014

           

    

 

                    COMPLETE BLOOD COUNT                   1887003              

     ABS BASO       

                                        0.04 10e9/L                   2014

          

     

 

                    COMPLETE BLOOD COUNT                   5689711              

     RDW-SD         

                                        43.2 fL                   2014    

            

 

                    HEMOGLOBIN A1C (GLYCOSYLATED)                   [id number]     

              A1C 

[facility]                   83681-1                   5.5 %                   

2012                

 

                    COMPLETE BLOOD COUNT                   [id number]              

     WBC            

                                        6.0 10e9/L                   2012 

               

 

                    COMPLETE BLOOD COUNT                   [id number]              

     RBC            

                                        4.22 10e12/L                   

2              

 

 

                    COMPLETE BLOOD COUNT                   [id number]              

     HGB            

                                        12.4 g/dL                   2012  

              

 

                    COMPLETE BLOOD COUNT                   [id number]              

     HCT DET        

                                        38.2 %                   2012     

           

 

                    COMPLETE BLOOD COUNT                   [id number]              

     MCV            

                                        90.5 fL                   2012    

            

 

                    COMPLETE BLOOD COUNT                   [id number]              

     MCH            

                                        29.4 pg                   2012    

            

 

                    COMPLETE BLOOD COUNT                   [id number]              

     MCHC           

                                        32.5 g/dL                   2012  

              

 

                    COMPLETE BLOOD COUNT                   [id number]              

     PLT            

                                        187 10e9/L                   2012 

               

 

                    COMPLETE BLOOD COUNT                   [id number]              

     MPV            

                                        11.5 fL                   2012    

            

 

                    COMPLETE BLOOD COUNT                   [id number]              

     ARIK %          

                                        36.4 %                   2012     

           

 

                    COMPLETE BLOOD COUNT                   [id number]              

     LY %           

                                        51.0 %                   2012     

           

 

                    COMPLETE BLOOD COUNT                   [id number]              

     MON %          

                                        8.7 %                   2012      

          

 

                    COMPLETE BLOOD COUNT                   [id number]              

     EOS  %         

                                        3.2 %                   2012      

          

 

                    COMPLETE BLOOD COUNT                   [id number]              

     BASO %         

                                        0.7 %                   2012      

          

 

                    COMPLETE BLOOD COUNT                   [id number]              

     RDW            

                                        13.7 %                   2012     

           

 

                    COMPLETE BLOOD COUNT                   [id number]              

     ABS ARIK        

                                        2.18 10e9/L                   2012

           

    

 

                    COMPLETE BLOOD COUNT                   [id number]              

     ABS LYMPH      

                                        3.06 10e9/L                   2012

         

      

 

                    COMPLETE BLOOD COUNT                   [id number]              

     ABS MONO       

                                        0.52 10e9/L                   2012

          

     

 

                    COMPLETE BLOOD COUNT                   [id number]              

     ABS EOS        

                                        0.19 10e9/L                   2012

           

    

 

                    COMPLETE BLOOD COUNT                   [id number]              

     ABS BASO       

                                        0.04 10e9/L                   2012

          

     

 

                    COMPLETE BLOOD COUNT                   [id number]              

     RDW-SD         

                                        44.3 fL                   2012    

            

 

                    LIPID GROUP                   [id number]                   HDL TE

ST                  

                                        42 MG/DL                   2012   

             

 

                LIPID GROUP                   [id number]                   TRIG      

                

                          156 MG/DL                   2012                

 

                    LIPID GROUP                   [id number]                   TEST L

DL                  

                                        80 MG/DL                   2012   

             

 

                LIPID GROUP                   [id number]                   CHOL      

                

                          153 MG/DL                   2012                

 

                    LIPID GROUP                   [id number]                   RCHOL/

HDL                 

                                        3.64 RATIO                   2012 

               

 

                FREE T4                   43832                   FREE T4       

                

                          1.22 NG/DL                   2012               

 

 

                    COMPREHENSIVE METABOLIC                   98448             

      AST           

                                        20 U/L                   2012     

           

 

                    COMPREHENSIVE METABOLIC                   53442             

      ALT           

                                        11 IU/L                   2012    

            

 

                    COMPREHENSIVE METABOLIC                   13891             

      BUN           

                                        19 MG/DL                   2012   

             

 

                    COMPREHENSIVE METABOLIC                   41570             

      ALBUMIN       

                                        4.3 GM/DL                   2012  

          

   

 

                    COMPREHENSIVE METABOLIC                   59002             

      CHLORIDE      

                                        109 MMOL/L                   2012 

         

     

 

                    COMPREHENSIVE METABOLIC                   63028             

      BILI TOT      

                                        0.6 MG/DL                   2012  

         

    

 

                    COMPREHENSIVE METABOLIC                   15086             

      ALK PHOS      

                                        84 U/L                   2012     

         

 

 

                    COMPREHENSIVE METABOLIC                   92246             

      SODIUM        

                                        142 MMOL/L                   2012 

           

   

 

                    COMPREHENSIVE METABOLIC                   74923             

      CREATININE    

                                        1.09 MG/DL                   2012 

       

       

 

                    COMPREHENSIVE METABOLIC                   91832             

      CALCIUM       

                                        9.8 MG/DL                   2012  

          

   

 

                    COMPREHENSIVE METABOLIC                   96146             

      POTASSIUM     

                                        4.2 MMOL/L                   2012 

        

      

 

                    COMPREHENSIVE METABOLIC                   94323             

      PROT TOT      

                                        6.4 GM/DL                   2012  

         

    

 

                    COMPREHENSIVE METABOLIC                   99995             

      Glucose       

                                        89 MG/DL                   2012   

          

  

 

                    COMPREHENSIVE METABOLIC                   58702             

      BICARB        

                                        25 MMOL/L                   2012  

           

  

 

                    COMPREHENSIVE METABOLIC                   92594             

      ANION GAP     

                                        8 MEQ/L                   2012    

        

   

 

                GFR CALC                   [id number]                   GFR AA     

                

                          60.0L ML/MIN                   2012             

   

 

                    GFR CALC                   [id number]                   GFR NON

-AA                 

                                        49.0L ML/MIN                   

2                

 

                    THYROID STIMULATING HORMONE                   97516         

          TSH       

                                        2.450 uIU/ML                   

2         

      

 

                    COMPREHENSIVE METABOLIC                   15887             

      AST           

                                        22 U/L                   2012     

           

 

                    COMPREHENSIVE METABOLIC                   79945             

      ALT           

                                        14 IU/L                   2012    

            

 

                    COMPREHENSIVE METABOLIC                   32661             

      BUN           

                                        21 MG/DL                   2012   

             

 

                    COMPREHENSIVE METABOLIC                   92420             

      ALBUMIN       

                                        4.3 GM/DL                   2012  

          

   

 

                    COMPREHENSIVE METABOLIC                   85360             

      CHLORIDE      

                                        106 MMOL/L                   2012 

         

     

 

                    COMPREHENSIVE METABOLIC                   45162             

      BILI TOT      

                                        0.4 MG/DL                   2012  

         

    

 

                    COMPREHENSIVE METABOLIC                   58672             

      ALK PHOS      

                                        80 U/L                   2012     

         

 

 

                    COMPREHENSIVE METABOLIC                   47191             

      SODIUM        

                                        141 MMOL/L                   2012 

           

   

 

                    COMPREHENSIVE METABOLIC                   10311             

      CREATININE    

                                        1.13 MG/DL                   2012 

       

       

 

                    COMPREHENSIVE METABOLIC                   02729             

      CALCIUM       

                                        9.4 MG/DL                   2012  

          

   

 

                    COMPREHENSIVE METABOLIC                   68085             

      POTASSIUM     

                                        4.3 MMOL/L                   2012 

        

      

 

                    COMPREHENSIVE METABOLIC                   20882             

      PROT TOT      

                                        6.7 GM/DL                   2012  

         

    

 

                    COMPREHENSIVE METABOLIC                   89998             

      Glucose       

                                        98 MG/DL                   2012   

          

  

 

                    COMPREHENSIVE METABOLIC                   85751             

      BICARB        

                                        25 MMOL/L                   2012  

           

  

 

                    COMPREHENSIVE METABOLIC                   68038             

      ANION GAP     

                                        10 MEQ/L                   2012   

        

    

 

                    LIPID GROUP                   [id number]                   HDL TE

ST                  

                                        44 MG/DL                   2012   

             

 

                LIPID GROUP                   [id number]                   TRIG      

                

                          164 MG/DL                   2012                

 

                    LIPID GROUP                   [id number]                   TEST L

DL                  

                                        98 MG/DL                   2012   

             

 

                LIPID GROUP                   [id number]                   CHOL      

                

                          175 MG/DL                   2012                

 

                    LIPID GROUP                   [id number]                   RCHOL/

HDL                 

                                        3.98 RATIO                   2012 

               

 

                    COMPLETE BLOOD COUNT                   38192                

   WBC              

                                        6.7 10e9/L                   2012 

               

 

                    COMPLETE BLOOD COUNT                   31728                

   RBC              

                                        4.36 10e12/L                   

2                

 

                    COMPLETE BLOOD COUNT                   39166                

   HGB              

                                        12.9 g/dL                   2012  

              

 

                    COMPLETE BLOOD COUNT                   10225                

   HCT DET          

                                        39.4 %                   2012     

           

 

                    COMPLETE BLOOD COUNT                   27256                

   MCV              

                                        90.4 fL                   2012    

            

 

                    COMPLETE BLOOD COUNT                   43298                

   MCH              

                                        29.6 pg                   2012    

            

 

                    COMPLETE BLOOD COUNT                   55169                

   MCHC             

                                        32.7 g/dL                   2012  

              

 

                    COMPLETE BLOOD COUNT                   57589                

   PLT              

                                        184 10e9/L                   2012 

               

 

                    COMPLETE BLOOD COUNT                   04560                

   MPV              

                                        10.9 fL                   2012    

            

 

                    COMPLETE BLOOD COUNT                   12233                

   ARIK %            

                                        41.5 %                   2012     

           

 

                    COMPLETE BLOOD COUNT                   15860                

   LY %             

                                        45.7 %                   2012     

           

 

                    COMPLETE BLOOD COUNT                   55232                

   MON %            

                                        9.4 %                   2012      

          

 

                    COMPLETE BLOOD COUNT                   97013                

   EOS  %           

                                        3.0 %                   2012      

          

 

                    COMPLETE BLOOD COUNT                   48098                

   BASO %           

                                        0.4 %                   2012      

          

 

                    COMPLETE BLOOD COUNT                   99798                

   RDW              

                                        13.2 %                   2012     

           

 

                    COMPLETE BLOOD COUNT                   63231                

   ABS ARIK          

                                        2.78 10e9/L                   2012

             

  

 

                    COMPLETE BLOOD COUNT                   39487                

   ABS LYMPH        

                                        3.06 10e9/L                   2012

           

    

 

                    COMPLETE BLOOD COUNT                   36003                

   ABS MONO         

                                        0.63 10e9/L                   2012

            

   

 

                    COMPLETE BLOOD COUNT                   73280                

   ABS EOS          

                                        0.20 10e9/L                   2012

             

  

 

                    COMPLETE BLOOD COUNT                   99724                

   ABS BASO         

                                        0.03 10e9/L                   2012

            

   

 

                    COMPLETE BLOOD COUNT                   87417                

   RDW-SD           

                                        42.3 fL                   2012    

            

 

                GFR CALC                   [id number]                   GFR AA     

                

                          57.0L ML/MIN                   2012             

   

 

                    GFR CALC                   3044938                   GFR NON

-AA                 

                                        47.0L ML/MIN                   

2                

 

                    THYROID STIMULATING HORMONE                   03496         

          TSH       

                                        2.663 uIU/ML                   

2         

      

 

                FREE T4                   82044                   FREE T4       

                

                          1.15 NG/DL                   2012               

 

 

                    THYROID STIMULATING HORMONE                   79564         

          TSH       

                                        1.908 uIU/ML                   

1         

      

 

                    COMPLETE BLOOD COUNT                   82143                

   WBC              

                                        6.4 10e9/L                   2011 

               

 

                    COMPLETE BLOOD COUNT                   52661                

   RBC              

                                        3.92 10e12/L                   

1                

 

                    COMPLETE BLOOD COUNT                   83326                

   HGB              

                                        11.8 g/dL                   2011  

              

 

                    COMPLETE BLOOD COUNT                   34645                

   HCT DET          

                                        36.0 %                   2011     

           

 

                    COMPLETE BLOOD COUNT                   24778                

   MCV              

                                        91.8 fL                   2011    

            

 

                    COMPLETE BLOOD COUNT                   60732                

   MCH              

                                        30.1 pg                   2011    

            

 

                    COMPLETE BLOOD COUNT                   71547                

   MCHC             

                                        32.8 g/dL                   2011  

              

 

                    COMPLETE BLOOD COUNT                   16683                

   PLT              

                                        176 10e9/L                   2011 

               

 

                    COMPLETE BLOOD COUNT                   36488                

   MPV              

                                        11.4 fL                   2011    

            

 

                    COMPLETE BLOOD COUNT                   44949                

   ARIK %            

                                        50.4 %                   2011     

           

 

                    COMPLETE BLOOD COUNT                   34927                

   LY %             

                                        35.5 %                   2011     

           

 

                    COMPLETE BLOOD COUNT                   55513                

   MON %            

                                        10.2 %                   2011     

           

 

                    COMPLETE BLOOD COUNT                   66593                

   EOS  %           

                                        3.3 %                   2011      

          

 

                    COMPLETE BLOOD COUNT                   58079                

   BASO %           

                                        0.6 %                   2011      

          

 

                    COMPLETE BLOOD COUNT                   38021                

   RDW              

                                        13.7 %                   2011     

           

 

                    COMPLETE BLOOD COUNT                   38784                

   ABS ARIK          

                                        3.23 10e9/L                   2011

             

  

 

                    COMPLETE BLOOD COUNT                   83511                

   ABS LYMPH        

                                        2.27 10e9/L                   2011

           

    

 

                    COMPLETE BLOOD COUNT                   45300                

   ABS MONO         

                                        0.65 10e9/L                   2011

            

   

 

                    COMPLETE BLOOD COUNT                   47349                

   ABS EOS          

                                        0.21 10e9/L                   2011

             

  

 

                    COMPLETE BLOOD COUNT                   17655                

   ABS BASO         

                                        0.04 10e9/L                   2011

            

   

 

                    COMPLETE BLOOD COUNT                   59419                

   RDW-SD           

                                        45.3 fL                   2011    

            

 

                GFR CALC                   1332555                   GFR AA     

                

                          >60 ML/MIN                   2011               

 

 

                    GFR CALC                   3888450                   GFR NON

-AA                 

                                        53.0L ML/MIN                   

1                

 

                FREE T4                   27645                   FREE T4       

                

                          1.20 NG/DL                   2011               

 

 

                    COMPREHENSIVE METABOLIC                   96955             

      AST           

                                        17 U/L                   2011     

           

 

                    COMPREHENSIVE METABOLIC                   26906             

      ALT           

                                        9 IU/L                   2011     

           

 

                    COMPREHENSIVE METABOLIC                   73942             

      BUN           

                                        16 MG/DL                   2011   

             

 

                    COMPREHENSIVE METABOLIC                   56853             

      ALBUMIN       

                                        4.0 GM/DL                   2011  

          

   

 

                    COMPREHENSIVE METABOLIC                   12279             

      CHLORIDE      

                                        108 MMOL/L                   2011 

         

     

 

                    COMPREHENSIVE METABOLIC                   67009             

      BILI TOT      

                                        0.5 MG/DL                   2011  

         

    

 

                    COMPREHENSIVE METABOLIC                   58979             

      ALK PHOS      

                                        76 U/L                   2011     

         

 

 

                    COMPREHENSIVE METABOLIC                   12660             

      SODIUM        

                                        139 MMOL/L                   2011 

           

   

 

                    COMPREHENSIVE METABOLIC                   47548             

      CREATININE    

                                        1.02 MG/DL                   2011 

       

       

 

                    COMPREHENSIVE METABOLIC                   59726             

      CALCIUM       

                                        9.2 MG/DL                   2011  

          

   

 

                    COMPREHENSIVE METABOLIC                   27925             

      POTASSIUM     

                                        4.5 MMOL/L                   2011 

        

      

 

                    COMPREHENSIVE METABOLIC                   69677             

      PROT TOT      

                                        6.1 GM/DL                   2011  

         

    

 

                    COMPREHENSIVE METABOLIC                   10226             

      Glucose       

                                        93 MG/DL                   2011   

          

  

 

                    COMPREHENSIVE METABOLIC                   26089             

      BICARB        

                                        26 MMOL/L                   2011  

           

  

 

                    COMPREHENSIVE METABOLIC                   41752             

      ANION GAP     

                                        5 MEQ/L                   2011    

        

   

 

                    [employer]                   [id number]                   HDL TE

ST                  

                                        46 MG/DL                   2011   

             

 

                [employer] [facility]                   [id number]                   TRIG      

                

                          102 MG/DL                   2011                

 

                    [facility]                   [id number]                   TEST L

DL                  

                                        88 MG/DL                   2011   

             

 

                [employer]                   [id number]                   CHOL      

                

                          154 MG/DL                   2011                

 

                    [facility]                   [id number]                   RCHOL/

HDL                 

                                        3.35 RATIO                   2011 

               



                                                                                
                                                                                
                                                                                
                                                                                
                                                                                
                                                                                
                                                                                
                                                                                
                                                                                
                                                                                
                                                          



Review of Systems

                      



                    System                   Result                   Effective 

Dates               



 

                    Cardiovascular                   arrhythmia                 

  06/10/2019        

       

 

                    Cardiovascular                   No chest pain/pressure     

              

06/10/2019                

 

                    Cardiovascular                   No edema                   

06/10/2019          

     

 

                    Cardiovascular                   No exercise intolerance    

               

06/10/2019                

 

                    Cardiovascular                   No orthopnea               

    06/10/2019      

         

 

                    Cardiovascular                   No palpitations            

       06/10/2019   

            

 

                    Cardiovascular                   hypertension               

    06/10/2019      

         

 

                    Gastrointestinal                   gastroesophageal reflux  

                 

06/10/2019                

 

                    Respiratory                   No asthma                   06

/10/2019            

   

 

                    Respiratory                   No cough                   06/

10/2019             

  

 

                    Respiratory                   No dyspnea                   0

6/10/2019           

    

 

                    Respiratory                   No pleuritic pain             

      06/10/2019    

           

 

                    Respiratory                   No productive sputum          

         06/10/2019 

              

 

                    Respiratory                   No wheezing                   

06/10/2019          

     

 

                    Musculoskeletal                   back pain                 

  06/10/2019        

       

 

                    Constitutional                   fatigue                   0

6/10/2019           

    

 

                    Gastrointestinal                   No hemorrhoids           

        2019  

             

 

                    Gastrointestinal                   No hepatitis             

      2019    

           

 

                    Gastrointestinal                   No abdominal pain        

           

2019                

 

                    Gastrointestinal                   No constipation          

         2019 

              

 

                    Gastrointestinal                   No diarrhea              

     2019     

          

 

                    Gastrointestinal                   No gastroesophageal reflu

x                   

2019                

 

                    Gastrointestinal                   No melena                

   2019       

        

 

                    Gastrointestinal                   No nausea                

   2019       

        

 

                    Gastrointestinal                   No vomiting              

     2019     

          

 

                    Cardiovascular                   hypertension               

    2019      

         

 

                    Cardiovascular                   palpitations               

    2019      

         

 

                    Respiratory                   No asthma                               

   

 

                    Respiratory                   No cough                   2019             

  

 

                    Respiratory                   No dyspnea                   0

3/06/2019           

    

 

                    Respiratory                   No pleuritic pain             

      2019    

           

 

                    Respiratory                   No productive sputum          

         2019 

              

 

                    Respiratory                   No wheezing                   

2019          

     

 

                          Genitourinary/Nephrology                   No urinary 

retention/hesitancy       

                                        2019                

 

                    Gastrointestinal                   abdominal pain           

        2018  

             

 

                    Gastrointestinal                   dyspepsia                

   2018       

        

 

                    Gastrointestinal                   gastroesophageal reflux  

                 

2018                

 

                    Ears/Nose/Throat/Neck                   No hearing loss     

              

2018                

 

                    Ears/Nose/Throat/Neck                   No nasal discharge  

                 

2018                

 

                    Ears/Nose/Throat/Neck                   No sinus congestion 

                  

2018                

 

                    Ears/Nose/Throat/Neck                   No sore throat      

             

2018                

 

                    Cardiovascular                   No arrhythmia              

     2018     

          

 

                    Cardiovascular                   No chest pain/pressure     

              

2018                

 

                    Cardiovascular                   No edema                   

2018          

     

 

                    Cardiovascular                   No exercise intolerance    

               

2018                

 

                    Cardiovascular                   No orthopnea               

    2018      

         

 

                    Cardiovascular                   No palpitations            

       2018   

            

 

                    Cardiovascular                   hypertension               

    2018      

         

 

                    Respiratory                   No asthma                               

   

 

                    Respiratory                   No cough                   2018             

  

 

                    Respiratory                   No dyspnea                   1

2018           

    

 

                    Respiratory                   No pleuritic pain             

      2018    

           

 

                    Respiratory                   No productive sputum          

         2018 

              

 

                    Respiratory                   No wheezing                   

2018          

     

 

                    Gastrointestinal                   No hemorrhoids           

        2018  

             

 

                    Gastrointestinal                   No hepatitis             

      2018    

           

 

                    Gastrointestinal                   No abdominal pain        

           

2018                

 

                    Gastrointestinal                   No constipation          

         2018 

              

 

                    Gastrointestinal                   No diarrhea              

     2018     

          

 

                    Gastrointestinal                   No gastroesophageal reflu

x                   

2018                

 

                    Gastrointestinal                   No melena                

   2018       

        

 

                    Gastrointestinal                   No nausea                

   2018       

        

 

                    Gastrointestinal                   No vomiting              

     2018     

          

 

                    Genitourinary/Nephrology                   No dysuria       

            

2018                

 

                    Genitourinary/Nephrology                   No nocturia      

             

2018                

 

                          Genitourinary/Nephrology                   No urinary 

incontinence              

                                        2018                

 

                    Musculoskeletal                   No muscle weakness        

           

2018                

 

                    Musculoskeletal                   No myalgias               

    2018      

         

 

                    Musculoskeletal                   No stiffness              

     2018     

          

 

                    Musculoskeletal                   No swelling               

    2018      

         

 

                    Dermatologic                   No rash                   2018             

  

 

                    Dermatologic                   No scar                   2018             

  

 

                    Neurologic                   No dizziness                   

2018          

     

 

                    Neurologic                   No headache                   1

2018           

    

 

                    Neurologic                   No neck pain                   

2018          

     

 

                    Neurologic                   No syncope                               

   

 

                    Psychiatric                   anxiety                   2018              

 

 

                    Psychiatric                   No depression                 

  2018        

       

 

                    Endocrine                   No goiter                   2018              

 

 

                    Endocrine                   No hyperglycemia                

   2018       

        

 

                    Endocrine                   No hypoglycemia                 

  2018        

       

 

                    Endocrine                   hyperlipidemia                  

 2018         

      

 

                    Psychiatric                   stress                                  



 

                    Constitutional                   fatigue                   1

2018           

    

 

                    Gastrointestinal                   gastroesophageal reflux  

                 

10/02/2018                

 

                    Gastrointestinal                   abdominal pain           

        10/02/2018  

             

 

                    Cardiovascular                   fatigue                   1

           

    

 

                    Constitutional                   fatigue                   1

           

    

 

                    Cardiovascular                   palpitations               

    10/02/2018      

         

 

                    Cardiovascular                   arrhythmia                 

  10/02/2018        

       

 

                    Respiratory                   No asthma                   10

/2018            

   

 

                    Respiratory                   No cough                   10/

2018             

  

 

                    Respiratory                   No dyspnea                   1

           

    

 

                    Respiratory                   No pleuritic pain             

      10/02/2018    

           

 

                    Respiratory                   No productive sputum          

         10/02/2018 

              

 

                    Respiratory                   No wheezing                   

10/02/2018          

     

 

                    Musculoskeletal                   No muscle weakness        

           

10/02/2018                

 

                    Musculoskeletal                   No myalgias               

    10/02/2018      

         

 

                    Musculoskeletal                   No stiffness              

     10/02/2018     

          

 

                    Musculoskeletal                   No swelling               

    10/02/2018      

         

 

                    Neurologic                   No dizziness                   

10/02/2018          

     

 

                    Neurologic                   No headache                   1

           

    

 

                    Neurologic                   No neck pain                   

10/02/2018          

     

 

                    Neurologic                   No syncope                   10

/2018            

   

 

                    Psychiatric                   anxiety                   10/0

2018              

 

 

                    Psychiatric                   No depression                 

  10/02/2018        

       

 

                    Psychiatric                   stress                   10/02

/2018               



 

                    Gastrointestinal                   gastroesophageal reflux  

                 

2018                

 

                    Gastrointestinal                   abdominal pain           

        2018  

             

 

                    Respiratory                   No asthma                               

   

 

                    Respiratory                   No cough                                

  

 

                    Respiratory                   No dyspnea                   0

2018           

    

 

                    Respiratory                   No pleuritic pain             

      2018    

           

 

                    Respiratory                   No productive sputum          

         2018 

              

 

                    Respiratory                   No wheezing                   

2018          

     

 

                    Cardiovascular                   chest pain/pressure        

           

2018                

 

                    Psychiatric                   anxiety                                 

 

 

                    Musculoskeletal                   No muscle weakness        

           

2018                

 

                    Musculoskeletal                   No myalgias               

    2018      

         

 

                    Musculoskeletal                   No stiffness              

     2018     

          

 

                    Musculoskeletal                   No swelling               

    2018      

         

 

                    Psychiatric                   stress                                  



 

                    Constitutional                   fatigue                   0

2018           

    

 

                    Cardiovascular                   hypertension               

    2018      

         

 

                    Psychiatric                   anxiety                                 

 

 

                    Psychiatric                   stress                                  



 

                    Neurologic                   headache                                 

 

 

                    Neurologic                   dizziness                                

  

 

                    Constitutional                   fatigue                   0

2018           

    

 

                    Neurologic                   dizziness                                

  

 

                    Cardiovascular                   hypertension               

    2018      

         

 

                    Cardiovascular                   arrhythmia                 

  2018        

       

 

                    Constitutional                   No chills                  

 2018         

      

 

                    Constitutional                   No fever                   

2018          

     

 

                    Constitutional                   No malaise                 

  2018        

       

 

                    Musculoskeletal                   No muscle weakness        

           

2018                

 

                    Musculoskeletal                   No myalgias               

    2018      

         

 

                    Musculoskeletal                   No stiffness              

     2018     

          

 

                    Musculoskeletal                   No swelling               

    2018      

         

 

                    Neurologic                   No dizziness                   

2018          

     

 

                    Neurologic                   No headache                   0

2018           

    

 

                    Neurologic                   No neck pain                   

2018          

     

 

                    Neurologic                   No syncope                               

   

 

                    Constitutional                   No fever                   

2018          

     

 

                    Constitutional                   No fatigue                 

  2018        

       

 

                    Ears/Nose/Throat/Neck                   otalgia             

      2018    

           

 

                    Ears/Nose/Throat/Neck                   No postnasal drip   

                

2018                

 

                    Ears/Nose/Throat/Neck                   No otorrhea         

          2018

               

 

                    Ears/Nose/Throat/Neck                   No sore throat      

             

2018                

 

                    Ears/Nose/Throat/Neck                   No sinusitis        

           

2018                

 

                    Ears/Nose/Throat/Neck                   No sinus congestion 

                  

2018                

 

                    Respiratory                   No cough                                

  

 

                    Gastrointestinal                   No abdominal pain        

           

2018                

 

                    Gastrointestinal                   No constipation          

         2018 

              

 

                    Neurologic                   No headache                   0

3/26/2018           

    

 

                    Neurologic                   dizziness                                

  

 

                    Neurologic                   No tinnitus                   0

3/26/2018           

    

 

                    Neurologic                   vertigo                                  



 

                    Musculoskeletal                   back pain                 

  2018        

       

 

                    Musculoskeletal                   joint complaint           

        2018  

             

 

                    Gastrointestinal                   abdominal pain           

        2018  

             

 

                    Musculoskeletal                   No muscle weakness        

           

2017                

 

                    Musculoskeletal                   No myalgias               

    2017      

         

 

                    Musculoskeletal                   No stiffness              

     2017     

          

 

                    Musculoskeletal                   No swelling               

    2017      

         

 

                    Musculoskeletal                   low back pain             

      2017    

           

 

                    Musculoskeletal                   thoracic back pain        

           

2017                

 

                    Neurologic                   gait abnormality               

    2017      

         

 

                    Musculoskeletal                   low back pain             

      2017    

           

 

                    Gastrointestinal                   abdominal pain           

        2017  

             

 

                    Constitutional                   No night sweats            

       2017   

            

 

                    Constitutional                   No chills                  

 2017         

      

 

                    Constitutional                   No fatigue                 

  2017        

       

 

                    Constitutional                   No fever                   

2017          

     

 

                    Eyes                   No eye discharge                               

   

 

                    Eyes                   No eye pain                   

017                

 

                    Eyes                   No vision change                               

   

 

                    Ears/Nose/Throat/Neck                   No dizziness        

           

2017                

 

                          Ears/Nose/Throat/Neck                   eustachian tub

e dysfunction             

                                        2017                

 

                    Ears/Nose/Throat/Neck                   No headache         

          2017

               

 

                    Ears/Nose/Throat/Neck                   nasal discharge     

              

2017                

 

                    Ears/Nose/Throat/Neck                   postnasal drip      

             

2017                

 

                    Ears/Nose/Throat/Neck                   sinus congestion    

               

2017                

 

                    Ears/Nose/Throat/Neck                   sore throat         

          2017

               

 

                    Cardiovascular                   No chest pain/pressure     

              

2017                

 

                    Cardiovascular                   No dyspnea                 

  2017        

       

 

                    Cardiovascular                   No orthopnea               

    2017      

         

 

                    Cardiovascular                   No palpitations            

       2017   

            

 

                    Cardiovascular                   No syncope                 

  2017        

       

 

                    Respiratory                   No chest tightness            

       2017   

            

 

                    Respiratory                   cough                   2017                

 

                    Respiratory                   No dyspnea                   0

2017           

    

 

                    Respiratory                   No wheezing                   

2017          

     

 

                    Gastrointestinal                   No diarrhea              

     2017     

          

 

                    Gastrointestinal                   No nausea                

   2017       

        

 

                    Gastrointestinal                   No vomiting              

     2017     

          

 

                          Hematologic/Lymphatic                   No lymph node 

enlargement/mass          

                                        2017                

 

                    Ears/Nose/Throat/Neck                   No facial pain      

             

2017                

 

                    Respiratory                   No chest congestion           

        2017  

             

 

                    Dermatologic                   No rash                   2017             

  

 

                    Dermatologic                   No scar                   2017             

  

 

                    Constitutional                   fatigue                   1

2016           

    

 

                    Constitutional                   fever                   2016             

  

 

                    Ears/Nose/Throat/Neck                   otalgia             

      2016    

           

 

                    Ears/Nose/Throat/Neck                   No sinus congestion 

                  

2016                

 

                    Ears/Nose/Throat/Neck                   No sinusitis        

           

2016                

 

                    Respiratory                   No asthma                               

   

 

                    Respiratory                   No cough                   2016             

  

 

                    Respiratory                   No dyspnea                   1

2016           

    

 

                    Respiratory                   No pleuritic pain             

      2016    

           

 

                    Respiratory                   No productive sputum          

         2016 

              

 

                    Respiratory                   No wheezing                   

2016          

     

 

                    Dermatologic                   No rash                   2016             

  

 

                    Dermatologic                   No scar                   2016             

  

 

                    Gastrointestinal                   No hemorrhoids           

        2016  

             

 

                    Gastrointestinal                   No hepatitis             

      2016    

           

 

                    Gastrointestinal                   No abdominal pain        

           

2016                

 

                    Gastrointestinal                   constipation             

      2016    

           

 

                    Gastrointestinal                   No diarrhea              

     2016     

          

 

                    Gastrointestinal                   No gastroesophageal reflu

x                   

2016                

 

                    Gastrointestinal                   No melena                

   2016       

        

 

                    Gastrointestinal                   No nausea                

   2016       

        

 

                    Gastrointestinal                   No vomiting              

     2016     

          

 

                    Cardiovascular                   No arrhythmia              

     2016     

          

 

                    Cardiovascular                   No chest pain/pressure     

              

2016                

 

                    Cardiovascular                   No edema                   

2016          

     

 

                    Cardiovascular                   No exercise intolerance    

               

2016                

 

                    Cardiovascular                   No orthopnea               

    2016      

         

 

                    Cardiovascular                   No palpitations            

       2016   

            

 

                    Cardiovascular                   hypertension               

    2016      

         

 

                    Respiratory                   No asthma                               

   

 

                    Respiratory                   No cough                                

  

 

                    Respiratory                   No dyspnea                   0

3/16/2016           

    

 

                    Respiratory                   No pleuritic pain             

      2016    

           

 

                    Respiratory                   No productive sputum          

         2016 

              

 

                    Respiratory                   No wheezing                   

2016          

     

 

                    Gastrointestinal                   No hemorrhoids           

        2016  

             

 

                    Gastrointestinal                   No hepatitis             

      2016    

           

 

                    Gastrointestinal                   No abdominal pain        

           

2016                

 

                    Gastrointestinal                   No constipation          

         2016 

              

 

                    Gastrointestinal                   No diarrhea              

     2016     

          

 

                    Gastrointestinal                   No gastroesophageal reflu

x                   

2016                

 

                    Gastrointestinal                   No melena                

   2016       

        

 

                    Gastrointestinal                   No nausea                

   2016       

        

 

                    Gastrointestinal                   No vomiting              

     2016     

          

 

                    Genitourinary/Nephrology                   No dysuria       

            

2016                

 

                    Genitourinary/Nephrology                   No nocturia      

             

2016                

 

                          Genitourinary/Nephrology                   No urinary 

incontinence              

                                        2016                

 

                    Musculoskeletal                   No muscle weakness        

           

2016                

 

                    Musculoskeletal                   No myalgias               

    2016      

         

 

                    Musculoskeletal                   No stiffness              

     2016     

          

 

                    Musculoskeletal                   No swelling               

    2016      

         

 

                    Dermatologic                   No rash                                

  

 

                    Dermatologic                   No scar                                

  

 

                    Neurologic                   No dizziness                   

2016          

     

 

                    Neurologic                   No headache                   0

3/16/2016           

    

 

                    Neurologic                   No neck pain                   

2016          

     

 

                    Neurologic                   No syncope                               

   

 

                    Psychiatric                   No anxiety                   0

3/16/2016           

    

 

                    Psychiatric                   No depression                 

  2016        

       

 

                    Endocrine                   No goiter                                 

 

 

                    Endocrine                   No hyperglycemia                

   2016       

        

 

                    Endocrine                   No hypoglycemia                 

  2016        

       

 

                    Ears/Nose/Throat/Neck                   No hearing loss     

              

2016                

 

                    Ears/Nose/Throat/Neck                   No nasal discharge  

                 

2016                

 

                    Ears/Nose/Throat/Neck                   No sinus congestion 

                  

2016                

 

                    Ears/Nose/Throat/Neck                   No sore throat      

             

2016                

 

                    Constitutional                   insomnia                   

2016          

     

 

                    Constitutional                   fatigue                   1

2015           

    

 

                    Constitutional                   fever                                

  

 

                    Ears/Nose/Throat/Neck                   otalgia             

      2015    

           

 

                    Ears/Nose/Throat/Neck                   No sinus congestion 

                  

2015                

 

                    Ears/Nose/Throat/Neck                   No sinusitis        

           

2015                

 

                    Respiratory                   No asthma                               

   

 

                    Respiratory                   No cough                                

  

 

                    Respiratory                   No dyspnea                   1

2015           

    

 

                    Respiratory                   No pleuritic pain             

      2015    

           

 

                    Respiratory                   No productive sputum          

         2015 

              

 

                    Respiratory                   No wheezing                   

2015          

     

 

                    Dermatologic                   No rash                                

  

 

                    Dermatologic                   No scar                                

  

 

                    Neurologic                   dizziness                                

  

 

                    Psychiatric                   No anxiety                   1

2015           

    

 

                    Psychiatric                   No depression                 

  2015        

       

 

                    Endocrine                   No goiter                                 

 

 

                    Endocrine                   No hyperglycemia                

   2015       

        

 

                    Endocrine                   No hypoglycemia                 

  2015        

       

 

                    Musculoskeletal                   No muscle weakness        

           

2015                

 

                    Musculoskeletal                   No myalgias               

    2015      

         

 

                    Musculoskeletal                   No stiffness              

     2015     

          

 

                    Musculoskeletal                   No swelling               

    2015      

         

 

                    Genitourinary/Nephrology                   No dysuria       

            

2015                

 

                    Genitourinary/Nephrology                   No nocturia      

             

2015                

 

                          Genitourinary/Nephrology                   No urinary 

incontinence              

                                        2015                

 

                    Gastrointestinal                   No hemorrhoids           

        2015  

             

 

                    Gastrointestinal                   No hepatitis             

      2015    

           

 

                    Gastrointestinal                   No abdominal pain        

           

2015                

 

                    Gastrointestinal                   No constipation          

         2015 

              

 

                    Gastrointestinal                   No diarrhea              

     2015     

          

 

                    Gastrointestinal                   No gastroesophageal reflu

x                   

2015                

 

                    Gastrointestinal                   No melena                

   2015       

        

 

                    Gastrointestinal                   No nausea                

   2015       

        

 

                    Gastrointestinal                   No vomiting              

     2015     

          

 

                    Cardiovascular                   No arrhythmia              

     2015     

          

 

                    Cardiovascular                   No chest pain/pressure     

              

2015                

 

                    Cardiovascular                   edema                                

  

 

                    Cardiovascular                   No exercise intolerance    

               

2015                

 

                    Cardiovascular                   No orthopnea               

    2015      

         

 

                    Cardiovascular                   No palpitations            

       2015   

            

 

                    Ears/Nose/Throat/Neck                   No hearing loss     

              

2015                

 

                    Ears/Nose/Throat/Neck                   No nasal discharge  

                 

2015                

 

                    Ears/Nose/Throat/Neck                   No sore throat      

             

2015                

 

                    Ears/Nose/Throat/Neck                   dizziness           

        2015  

             

 

                    Cardiovascular                   hypertension               

    2015      

         

 

                    Musculoskeletal                   arthralgia(s)             

      2015    

           

 

                    Musculoskeletal                   low back pain             

      2015    

           

 

                    Musculoskeletal                   back pain                 

  2015        

       

 

                    Neurologic                   vertigo                                  



 

                    Psychiatric                   stress                                  



 

                    Endocrine                   hyperlipidemia                  

 2015         

      

 

                          Hematologic/Lymphatic                   No abnormal ec

chymoses                  

                                        2015                

 

                    Hematologic/Lymphatic                   No petechiae        

           

2015                

 

                          Hematologic/Lymphatic                   No abnormal bl

eeding and bruising       

                                        2015                

 

                    Hematologic/Lymphatic                   No anemia           

        2015  

             

 

                          Hematologic/Lymphatic                   No lymph node 

enlargement/mass          

                                        2015                

 

                    Constitutional                   fatigue                   0

2015           

    

 

                    Psychiatric                   stress                                  



 

                    Psychiatric                   depression                   0

2015           

    

 

                    Ears/Nose/Throat/Neck                   otalgia             

      2015    

           

 

                    Ears/Nose/Throat/Neck                   No sore throat      

             

2015                

 

                    Ears/Nose/Throat/Neck                   No sinus congestion 

                  

2015                

 

                    Constitutional                   No fever                   

2015          

     

 

                    Respiratory                   No cough                                

  

 

                    Neurologic                   pain, limb                               

   

 

                    Cardiovascular                   chest pain/pressure        

           

2015                

 

                    Musculoskeletal                   back pain                 

  2015        

       

 

                    Musculoskeletal                   muscle spasm              

     2015     

          

 

                    Neurologic                   dizziness                                

  

 

                    Cardiovascular                   hypertension               

    2015      

         

 

                    Dermatologic                   skin lesion                  

 2015         

      

 

                    Ears/Nose/Throat/Neck                   dizziness           

        2014  

             

 

                    Ears/Nose/Throat/Neck                   facial pain         

          2014

               

 

                    Ears/Nose/Throat/Neck                   sinusitis           

        2014  

             

 

                    Ears/Nose/Throat/Neck                   otalgia             

      2014    

           

 

                    Respiratory                   cough                   2014                

 

                    Constitutional                   No fever                   

2014          

     

 

                    Musculoskeletal                   back pain                 

  2014        

       

 

                    Respiratory                   cough                   2014                

 

                          Ears/Nose/Throat/Neck                   eustachian tub

e dysfunction             

                                        2014                

 

                    Ears/Nose/Throat/Neck                   sinus congestion    

               

2014                

 

                    Ears/Nose/Throat/Neck                   sinusitis           

        2014  

             

 

                    Ears/Nose/Throat/Neck                   headache            

       2014   

            

 

                    Ears/Nose/Throat/Neck                   sinusitis           

        2014  

             

 

                    Ears/Nose/Throat/Neck                   sore throat         

          2014

               

 

                    Respiratory                   cough                   2014                

 

                    Constitutional                   No fever                   

2014          

     

 

                    Constitutional                   No recent illness          

         2014 

              

 

                    Respiratory                   cough                   10/15/

2013                

 

                    Cardiovascular                   hypertension               

    10/15/2013      

         

 

                    Endocrine                   hyperlipidemia                  

 10/15/2013         

      

 

                    Endocrine                   obesity                   10/15/

2013                

 

                    Gastrointestinal                   dyspepsia                

   10/15/2013       

        

 

                    Constitutional                   No fever                   

10/15/2013          

     

 

                    Cardiovascular                   hypertension               

    2013      

         

 

                    Neurologic                   dizziness                                

  

 

                    Ears/Nose/Throat/Neck                   sinusitis           

        2013  

             

 

                    Ears/Nose/Throat/Neck                   sinus congestion    

               

2013                

 

                    Ears/Nose/Throat/Neck                   dizziness           

        2013  

             

 

                    Musculoskeletal                   back pain                 

  2013        

       

 

                    Ears/Nose/Throat/Neck                   No hearing loss     

              

2013                

 

                    Ears/Nose/Throat/Neck                   No nasal discharge  

                 

2013                

 

                    Ears/Nose/Throat/Neck                   No sinus congestion 

                  

2013                

 

                    Ears/Nose/Throat/Neck                   No sore throat      

             

2013                

 

                    Constitutional                   No chills                  

 05/10/2013         

      

 

                    Constitutional                   No anorexia                

   05/10/2013       

        

 

                    Constitutional                   recent illness             

      05/10/2013    

           

 

                    Constitutional                   No fever                   

05/10/2013          

     

 

                    Constitutional                   No fatigue                 

  05/10/2013        

       

 

                    Constitutional                   No malaise                 

  05/10/2013        

       

 

                    Constitutional                   No insomnia                

   05/10/2013       

        

 

                    Ears/Nose/Throat/Neck                   sore throat         

          05/10/2013

               

 

                    Ears/Nose/Throat/Neck                   otalgia             

      05/10/2013    

           

 

                    Ears/Nose/Throat/Neck                   sinus congestion    

               

05/10/2013                

 

                    Ears/Nose/Throat/Neck                   headache            

       05/10/2013   

            

 

                    Respiratory                   No cough                   05/

10/2013             

  

 

                    Genitourinary/Nephrology                   breast complaint 

                  

2013                

 

                    Gastrointestinal                   abdominal pain           

        2012  

             

 

                    Gastrointestinal                   dyspepsia                

   2012       

        

 

                    Gastrointestinal                   gastroesophageal reflux  

                 

2012                

 

                    Neurologic                   dizziness                                

  

 

                    Ears/Nose/Throat/Neck                   otalgia             

      2012    

           

 

                    Gastrointestinal                   No hemorrhoids           

        10/23/2012  

             

 

                    Gastrointestinal                   No hepatitis             

      10/23/2012    

           

 

                    Gastrointestinal                   abdominal pain           

        10/23/2012  

             

 

                    Gastrointestinal                   No constipation          

         10/23/2012 

              

 

                    Gastrointestinal                   No diarrhea              

     10/23/2012     

          

 

                    Gastrointestinal                   gastroesophageal reflux  

                 

10/23/2012                

 

                    Gastrointestinal                   No melena                

   10/23/2012       

        

 

                    Gastrointestinal                   No nausea                

   10/23/2012       

        

 

                    Gastrointestinal                   No vomiting              

     10/23/2012     

          

 

                    Gastrointestinal                   gas and bloating         

          10/23/2012

               

 

                    Neurologic                   dizziness                                

  

 

                    Cardiovascular                   hypertension               

    2012      

         

 

                    Endocrine                   hypoglycemia                   0

2012           

    

 

                    Constitutional                   fatigue                   0

2012           

    

 

                    Constitutional                   fatigue                   0

2012           

    

 

                    Neurologic                   dizziness                   2012             

  

 

                    Musculoskeletal                   muscle weakness           

        2012  

             

 

                    Cardiovascular                   hypertension               

    2012      

         

 

                    Endocrine                   diabetes mellitus type 2        

           

2012                

 

                    Musculoskeletal                   sciatica                  

 2012         

      

 

                    Neurologic                   pain, limb                               

   

 

                    Musculoskeletal                   back pain                 

  2012        

       

 

                    Musculoskeletal                   low back pain             

      2012    

           

 

                    Neurologic                   pain, limb                               

   

 

                    Musculoskeletal                   back pain                 

  2012        

       

 

                    Musculoskeletal                   joint complaint           

        2012  

             

 

                    Musculoskeletal                   back pain                 

  2012        

       

 

                    Musculoskeletal                   back pain                 

  2012        

       

 

                    Neurologic                   pain, limb                               

   

 

                    Neurologic                   pain, back                               

   

 

                    Constitutional                   fatigue                   0

2012           

    

 

                    Constitutional                   fatigue                   0

2012           

    

 

                    Cardiovascular                   No hypertension            

       2012   

            

 

                    Cardiovascular                   fatigue                   0

2012           

    

 

                    Cardiovascular                   No chest pain/pressure     

              

2012                

 

                    Respiratory                   No asthma                               

   

 

                    Respiratory                   No pleuritic pain             

      2012    

           

 

                    Respiratory                   No productive sputum          

         2012 

              

 

                    Respiratory                   No cough                                

  

 

                    Respiratory                   No dyspnea                   0

2012           

    

 

                    Respiratory                   No wheezing                   

2012          

     

 

                    Gastrointestinal                   No hemorrhoids           

        2012  

             

 

                    Gastrointestinal                   No hepatitis             

      2012    

           

 

                    Gastrointestinal                   No abdominal pain        

           

2012                

 

                    Gastrointestinal                   No constipation          

         2012 

              

 

                    Gastrointestinal                   No diarrhea              

     2012     

          

 

                    Gastrointestinal                   No gastroesophageal reflu

x                   

2012                

 

                    Gastrointestinal                   No melena                

   2012       

        

 

                    Gastrointestinal                   No nausea                

   2012       

        

 

                    Gastrointestinal                   No vomiting              

     2012     

          

 

                    Genitourinary/Nephrology                   No dysuria       

            

2012                

 

                    Genitourinary/Nephrology                   No nocturia      

             

2012                

 

                          Genitourinary/Nephrology                   No urinary 

incontinence              

                                        2012                

 

                    Musculoskeletal                   arthralgia(s)             

      2012    

           

 

                    Dermatologic                   No rash                                

  

 

                    Dermatologic                   No scar                                

  

 

                    Neurologic                   No dizziness                   

2012          

     

 

                    Neurologic                   No headache                   0

2012           

    

 

                    Neurologic                   No neck pain                   

2012          

     

 

                    Neurologic                   No syncope                               

   

 

                    Psychiatric                   No anxiety                   0

2012           

    

 

                    Psychiatric                   No depression                 

  2012        

       

 

                    Endocrine                   No goiter                                 

 

 

                    Endocrine                   No hyperglycemia                

   2012       

        

 

                    Endocrine                   No hypoglycemia                 

  2012        

       

 

                    Ears/Nose/Throat/Neck                   No hearing loss     

              

2011                

 

                    Ears/Nose/Throat/Neck                   No nasal discharge  

                 

2011                

 

                    Ears/Nose/Throat/Neck                   No sinus congestion 

                  

2011                

 

                    Ears/Nose/Throat/Neck                   sore throat         

          2011

               

 

                    Ears/Nose/Throat/Neck                   sinusitis           

        2011  

             

 

                    Ears/Nose/Throat/Neck                   otalgia             

      2011    

           

 

                    Neurologic                   headache                   2011              

 

 

                    Respiratory                   cough                   2011                

 

                    Musculoskeletal                   back pain                 

  2011        

       

 

                    Gastrointestinal                   abdominal pain           

        2011  

             

 

                    Neurologic                   dizziness                   2011             

  

 

                    Gastrointestinal                   No abdominal pain        

           

2011                

 

                    Gastrointestinal                   gastroesophageal reflux  

                 

2011                

 

                    Gastrointestinal                   No dyspepsia             

      2011    

           

 

                    Gastrointestinal                   No diarrhea              

     2011     

          

 

                    Gastrointestinal                   No constipation          

         2011 

              

 

                    Gastrointestinal                   No nausea                

   2011       

        

 

                    Musculoskeletal                   back pain                 

  2011        

       

 

                    Gastrointestinal                   abdominal pain           

        2011  

             

 

                    Neurologic                   dizziness                                

  

 

                    Ears/Nose/Throat/Neck                   otitis media        

           

2011                

 

                    Ears/Nose/Throat/Neck                   otalgia             

      2011    

           

 

                    Gastrointestinal                   gastroesophageal reflux  

                 

2011                

 

                    Musculoskeletal                   back pain                 

  2011        

       

 

                    Musculoskeletal                   low back pain             

      2011    

           

 

                    Musculoskeletal                   back pain                 

  2011        

       

 

                    Musculoskeletal                   low back pain             

      2011    

           

 

                    Constitutional                   fatigue                   0

2011           

    

 

                    Psychiatric                   stress                                  



 

                    Psychiatric                   depression                   0

2011           

    

 

                    Constitutional                   fever                                

  

 

                    Ears/Nose/Throat/Neck                   headache            

       2010   

            

 

                    Ears/Nose/Throat/Neck                   nasal discharge     

              

2010                

 

                    Ears/Nose/Throat/Neck                   sore throat         

          2010

               

 

                    Ears/Nose/Throat/Neck                   sinusitis           

        2010  

             

 

                    Ears/Nose/Throat/Neck                   sinus congestion    

               

2010                

 

                    Ears/Nose/Throat/Neck                   No tonsillitis      

             

2010                

 

                    Respiratory                   cough                   2010                

 

                    Respiratory                   nocturnal cough               

    2010      

         

 

                    Gastrointestinal                   No diarrhea              

     2010     

          

 

                    Gastrointestinal                   No constipation          

         2010 

              

 

                    Gastrointestinal                   nausea                   

2010          

     

 

                    Gastrointestinal                   No vomiting              

     2010     

          

 

                    Dermatologic                   No rash                                

  

 

                    Dermatologic                   No sores                               

   

 

                    Constitutional                   fatigue                   0

2010           

    

 

                    Gastrointestinal                   gastroesophageal reflux  

                 

2010                

 

                    Psychiatric                   anxiety                                 

 

 

                    Psychiatric                   stress                                  



 

                    Genitourinary/Nephrology                   urinary urgency  

                 

2010                

 

                    Constitutional                   fatigue                   0

2010           

    

 

                    Neurologic                   weakness                                 

 

 

                    Psychiatric                   anxiety                                 

 

 

                    Psychiatric                   stress                                  



 

                    Neurologic                   dyskinesia or tremor           

        2010  

             

 

                    Gastrointestinal                   constipation             

      2010    

           

 

                    Gastrointestinal                   gastroesophageal reflux  

                 

2010                

 

                    Gastrointestinal                   gas and bloating         

          2010

               

 

                    Constitutional                   fatigue                   0

2010           

    

 

                    Constitutional                   fatigue                   0

2010           

    

 

                    Psychiatric                   anxiety                   2010              

 

 

                    Psychiatric                   depression                   0

2010           

    

 

                    Musculoskeletal                   muscle weakness           

        2010  

             

 

                    Gastrointestinal                   abdominal pain           

        2010  

             

 

                    Cardiovascular                   hypertension               

    2010      

         



                                                                    



Physical Exam

                      



                    Exam Name                   System Name                   It

em Name             

                    Status                   Result                   Effective 

Dates          

                                        Notes                

 

                    Full Exam - General                   Constitutional        

            general 

appearance                   Overall:                   well nourished          

                          06/10/2019                   None                

 

                    Full Exam - General                   Constitutional        

            general 

appearance                   Overall:                   well developed          

                          06/10/2019                   None                

 

                    Full Exam - General                   Constitutional        

            general 

appearance                   Overall:                   in no acute distress    

                          06/10/2019                   None                

 

                    Full Exam - General                   Neurologic            

       mental status

                    Overall:                   alert                   06/10/201

9 

                                        None                

 

                    Full Exam - General                   Neurologic            

       mental status

                    Overall:                   oriented                   

06/10/2019                              None                

 

                    Full Exam - General                   Psychiatric           

        mood and 

affect                    Overall:                   normal mood and affect     

 

                          06/10/2019                   None                

 

                    Full Exam - General                   Respiratory           

        auscultation

                          Overall:                   breath sounds clear bilater

ally    

                          06/10/2019                   None                

 

                    Full Exam - General                   Cardiovascular        

           

auscultation of heart                   Overall:                   regular rate 

                          06/10/2019                   None                

 

                    Full Exam - General                   Cardiovascular        

           

auscultation of heart                   Overall:                   normal heart 

sounds                    06/10/2019                   None                

 

                    Full Exam - General                   Cardiovascular        

           

auscultation of heart                   S4 (atrial gallop):                   

present                   06/10/2019                   None                

 

                    Full Exam - General                   Cardiovascular        

           

auscultation of heart                   Murmur:                   previously 

known murmur unchanged                   06/10/2019                   None      

         

 

                    Full Exam - General                   Cardiovascular        

           

extremities                   Overall:                   no clubbing            

                          06/10/2019                   None                

 

                    Full Exam - General                   Cardiovascular        

           

extremities                   Overall:                   No cyanosis            

                          06/10/2019                   None                

 

                    Full Exam - General                   Cardiovascular        

           

extremities                   Overall:                   No edema               

                          06/10/2019                   None                

 

                    Full Exam - General                   Constitutional        

            general 

appearance                   Overall:                   well nourished          

                          2019                   None                

 

                    Full Exam - General                   Constitutional        

            general 

appearance                   Overall:                   well developed          

                          2019                   None                

 

                    Full Exam - General                   Constitutional        

            general 

appearance                   Overall:                   in no acute distress    

                          2019                   None                

 

                    Full Exam - General                   Neurologic            

       mental status

                    Overall:                   alert                   

9 

                                        None                

 

                    Full Exam - General                   Neurologic            

       mental status

                    Overall:                   oriented                   

2019                              None                

 

                    Full Exam - General                   Psychiatric           

        mood and 

affect                    Overall:                   normal mood and affect     

 

                          2019                   None                

 

                    Full Exam - General                   Abdomen               

    abdominal exam  

                    Overall:                   no masses                   

2019                              None                

 

                    Full Exam - General                   Abdomen               

    abdominal exam  

                          Overall:                   normal bowel sounds        

          

                          2019                   None                

 

                    Full Exam - General                   Abdomen               

    abdominal exam  

                    Overall:                   soft                   2019

    

                                        None                

 

                    Full Exam - General                   Respiratory           

        auscultation

                          Overall:                   breath sounds clear bilater

ally    

                          2019                   None                

 

                    Full Exam - General                   Cardiovascular        

           

auscultation of heart                   Overall:                   regular rate 

                          2019                   None                

 

                    Full Exam - General                   Cardiovascular        

           

auscultation of heart                   Overall:                   normal heart 

sounds                    2019                   None                

 

                    Full Exam - General                   Cardiovascular        

           

extremities                   Overall:                   no clubbing            

                          2019                   None                

 

                    Full Exam - General                   Cardiovascular        

           

extremities                   Overall:                   No edema               

                          2019                   None                

 

                    Full Exam - General                   Cardiovascular        

           

extremities                   Overall:                   No cyanosis            

                          2019                   None                

 

                    Full Exam - General                   Constitutional        

            general 

appearance                   Overall:                   well nourished          

                          2018                   None                

 

                    Full Exam - General                   Constitutional        

            general 

appearance                   Overall:                   well developed          

                          2018                   None                

 

                    Full Exam - General                   Constitutional        

            general 

appearance                   Overall:                   in no acute distress    

                          2018                   None                

 

                    Full Exam - General                   Neurologic            

       mental status

                    Overall:                   alert                   

8 

                                        None                

 

                    Full Exam - General                   Neurologic            

       mental status

                    Overall:                   oriented                   

2018                              None                

 

                    Full Exam - General                   Psychiatric           

        mood and 

affect                    Overall:                   normal mood and affect     

 

                          2018                   None                

 

                    Full Exam - General                   Abdomen               

    abdominal exam  

                    Overall:                   no masses                   

2018                              None                

 

                    Full Exam - General                   Abdomen               

    abdominal exam  

                          Overall:                   normal bowel sounds        

          

                          2018                   None                

 

                    Full Exam - General                   Abdomen               

    abdominal exam  

                    Overall:                   soft                   2018

    

                                        None                

 

                    Full Exam - General                   Abdomen               

    abdominal exam  

                          Epigastric:                   tender to palpation     

          

                          2018                   None                

 

                    Full Exam - General                   Abdomen               

    abdominal exam  

                          Left upper quadrant:                   tender to palpa

tion      

                          2018                   None                

 

                    Full Exam - General                   Respiratory           

        auscultation

                          Overall:                   breath sounds clear bilater

ally    

                          2018                   None                

 

                    Full Exam - General                   Cardiovascular        

           

auscultation of heart                   Overall:                   regular rate 

                          2018                   None                

 

                    Full Exam - General                   Cardiovascular        

           

auscultation of heart                   Overall:                   normal heart 

sounds                    2018                   None                

 

                    Full Exam - General                   Cardiovascular        

           

auscultation of heart                   S4 (atrial gallop):                   

present                   2018                   None                

 

                    Full Exam - General                   Cardiovascular        

           

auscultation of heart                   Murmur:                   previously 

known murmur unchanged                   2018                   None      

         

 

                    Full Exam - General                   Constitutional        

            general 

appearance                   Overall:                   well nourished          

                          2018                   None                

 

                    Full Exam - General                   Constitutional        

            general 

appearance                   Overall:                   well developed          

                          2018                   None                

 

                    Full Exam - General                   Constitutional        

            general 

appearance                   Overall:                   in no acute distress    

                          2018                   None                

 

                    Full Exam - General                   Neurologic            

       mental status

                    Overall:                   alert                   

8 

                                        None                

 

                    Full Exam - General                   Neurologic            

       mental status

                    Overall:                   oriented                   

2018                              None                

 

                    Full Exam - General                   Psychiatric           

        mood and 

affect                    Overall:                   normal mood and affect     

 

                          2018                   None                

 

                    Full Exam - General                   Respiratory           

        auscultation

                          Overall:                   breath sounds clear bilater

ally    

                          2018                   None                

 

                    Full Exam - General                   Cardiovascular        

           

auscultation of heart                   Overall:                   normal heart 

sounds                    2018                   None                

 

                    Full Exam - General                   Cardiovascular        

           

auscultation of heart                   S4 (atrial gallop):                   

present                   2018                   None                

 

                    Full Exam - General                   Cardiovascular        

           

auscultation of heart                   Rate:                     bradycardia   

  

                          2018                   None                

 

                    Full Exam - General                   Cardiovascular        

           

extremities                   Overall:                   no clubbing            

                          2018                   None                

 

                    Full Exam - General                   Cardiovascular        

           

extremities                   Overall:                   No edema               

                          2018                   None                

 

                    Full Exam - General                   Cardiovascular        

           

extremities                   Overall:                   No cyanosis            

                          2018                   None                

 

                    Full Exam - General                   Neck                  

 inspection of neck 

                    Overall:                   normal size                   

2018                              None                

 

                    Full Exam - General                   Neck                  

 inspection of neck 

                    Overall:                   no masses                   

2018                              None                

 

                    Full Exam - General                   Abdomen               

    abdominal exam  

                    Overall:                   no masses                   

2018                              None                

 

                    Full Exam - General                   Abdomen               

    abdominal exam  

                          Overall:                   normal bowel sounds        

          

                          2018                   None                

 

                    Full Exam - General                   Abdomen               

    abdominal exam  

                    Overall:                   soft                   2018

    

                                        None                

 

                    Full Exam - General                   Abdomen               

    abdominal exam  

                          Epigastric:                   tender to palpation     

          

                          2018                   None                

 

                    Full Exam - General                   Constitutional        

            general 

appearance                   Overall:                   well nourished          

                          10/02/2018                   None                

 

                    Full Exam - General                   Constitutional        

            general 

appearance                   Overall:                   well developed          

                          10/02/2018                   None                

 

                    Full Exam - General                   Constitutional        

            general 

appearance                   Overall:                   in no acute distress    

                          10/02/2018                   None                

 

                    Full Exam - General                   Neurologic            

       mental status

                    Overall:                   alert                   10/02/201

8 

                                        None                

 

                    Full Exam - General                   Neurologic            

       mental status

                    Overall:                   oriented                   

10/02/2018                              None                

 

                    Full Exam - General                   Psychiatric           

        mood and 

affect                    Overall:                   normal mood and affect     

 

                          10/02/2018                   None                

 

                    Full Exam - General                   Abdomen               

    abdominal exam  

                    Overall:                   no masses                   

10/02/2018                              None                

 

                    Full Exam - General                   Abdomen               

    abdominal exam  

                          Overall:                   normal bowel sounds        

          

                          10/02/2018                   None                

 

                    Full Exam - General                   Abdomen               

    abdominal exam  

                    Overall:                   soft                   10/02/2018

    

                                        None                

 

                    Full Exam - General                   Abdomen               

    abdominal exam  

                          Epigastric:                   tender to palpation     

          

                          10/02/2018                   None                

 

                    Full Exam - General                   Respiratory           

        auscultation

                          Overall:                   breath sounds clear bilater

ally    

                          10/02/2018                   None                

 

                    Full Exam - General                   Cardiovascular        

           

auscultation of heart                   Overall:                   regular rate 

                          10/02/2018                   None                

 

                    Full Exam - General                   Cardiovascular        

           

auscultation of heart                   Overall:                   normal heart 

sounds                    10/02/2018                   None                

 

                    Full Exam - General                   Cardiovascular        

           

auscultation of heart                   Murmur:                   previously 

known murmur unchanged                   10/02/2018                   None      

         

 

                    Full Exam - General                   Cardiovascular        

           

auscultation of heart                   S4 (atrial gallop):                   

present                   10/02/2018                   None                

 

                    Full Exam - General                   Cardiovascular        

           

extremities                   Overall:                   no clubbing            

                          10/02/2018                   None                

 

                    Full Exam - General                   Cardiovascular        

           

extremities                   Overall:                   No cyanosis            

                          10/02/2018                   None                

 

                    Full Exam - General                   Cardiovascular        

           

extremities                   Edema present:                   non-pitting      

                          10/02/2018                   None                

 

                    Full Exam - General                   Constitutional        

            general 

appearance                   Overall:                   well nourished          

                          2018                   None                

 

                    Full Exam - General                   Constitutional        

            general 

appearance                   Overall:                   well developed          

                          2018                   None                

 

                    Full Exam - General                   Constitutional        

            general 

appearance                   Evidence of Distress:                   anxious    

                          2018                   None                

 

                    Full Exam - General                   Respiratory           

        auscultation

                          Overall:                   breath sounds clear bilater

ally    

                          2018                   None                

 

                    Full Exam - General                   Cardiovascular        

           

auscultation of heart                   Overall:                   regular rate 

                          2018                   None                

 

                    Full Exam - General                   Cardiovascular        

           

auscultation of heart                   Overall:                   normal heart 

sounds                    2018                   None                

 

                    Full Exam - General                   Cardiovascular        

           

auscultation of heart                   Murmur:                   previously 

known murmur unchanged                   2018                   None      

         

 

                    Full Exam - General                   Cardiovascular        

           

auscultation of heart                   S4 (atrial gallop):                   

present                   2018                   None                

 

                    Full Exam - General                   Cardiovascular        

           

extremities                   Overall:                   no clubbing            

                          2018                   None                

 

                    Full Exam - General                   Cardiovascular        

           

extremities                   Overall:                   No edema               

                          2018                   None                

 

                    Full Exam - General                   Cardiovascular        

           

extremities                   Overall:                   No cyanosis            

                          2018                   None                

 

                    Full Exam - General                   Neurologic            

       mental status

                    Overall:                   oriented                   

2018                              None                

 

                    Full Exam - General                   Neurologic            

       mental status

                    Overall:                   alert                   

8 

                                        None                

 

                    Full Exam - General                   Psychiatric           

        mood and 

affect                    Overall:                   normal mood and affect     

 

                          2018                   None                

 

                    Full Exam - General                   Abdomen               

    abdominal exam  

                    Overall:                   no masses                   

2018                              None                

 

                    Full Exam - General                   Abdomen               

    abdominal exam  

                          Overall:                   normal bowel sounds        

          

                          2018                   None                

 

                    Full Exam - General                   Abdomen               

    abdominal exam  

                    Overall:                   soft                   2018

    

                                        None                

 

                    Full Exam - General                   Abdomen               

    abdominal exam  

                          Epigastric:                   tender to palpation     

          

                          2018                   None                

 

                    Full Exam - General                   Constitutional        

            general 

appearance                   Evidence of Distress:                   tearful    

                          2018                   None                

 

                    Full Exam - General                   Constitutional        

            general 

appearance                   Overall:                   well nourished          

                          2018                   None                

 

                    Full Exam - General                   Constitutional        

            general 

appearance                   Overall:                   well developed          

                          2018                   None                

 

                    Full Exam - General                   Constitutional        

            general 

appearance                   Overall:                   in no acute distress    

                          2018                   None                

 

                    Full Exam - General                   Neurologic            

       mental status

                    Overall:                   alert                   

8 

                                        None                

 

                    Full Exam - General                   Neurologic            

       mental status

                    Overall:                   oriented                   

2018                              None                

 

                    Full Exam - General                   Respiratory           

        auscultation

                          Overall:                   breath sounds clear bilater

ally    

                          2018                   None                

 

                    Full Exam - General                   Cardiovascular        

           

auscultation of heart                   Overall:                   regular rate 

                          2018                   None                

 

                    Full Exam - General                   Cardiovascular        

           

auscultation of heart                   Overall:                   normal heart 

sounds                    2018                   None                

 

                    Full Exam - General                   Cardiovascular        

           

auscultation of heart                   S4 (atrial gallop):                   

present                   2018                   None                

 

                    Full Exam - General                   Cardiovascular        

           

auscultation of heart                   Murmur:                   previously 

known murmur unchanged                   2018                   None      

         

 

                    Full Exam - General                   Cardiovascular        

           

extremities                   Overall:                   no clubbing            

                          2018                   None                

 

                    Full Exam - General                   Cardiovascular        

           

extremities                   Overall:                   No edema               

                          2018                   None                

 

                    Full Exam - General                   Cardiovascular        

           

extremities                   Overall:                   No cyanosis            

                          2018                   None                

 

                    Full Exam - General                   Constitutional        

            general 

appearance                   Overall:                   well nourished          

                          2018                   None                

 

                    Full Exam - General                   Constitutional        

            general 

appearance                   Overall:                   in no acute distress    

                          2018                   None                

 

                    Full Exam - General                   Cardiovascular        

           

auscultation of heart                   Overall:                   regular rate 

                          2018                   None                

 

                    Full Exam - General                   Cardiovascular        

           

auscultation of heart                   Overall:                   no murmurs   

                          2018                   None                

 

                    Full Exam - General                   Respiratory           

        percussion  

                    Overall:                   benign percussion                

   

2018                              None                

 

                    Full Exam - General                   Respiratory           

        respiratory 

effort/rhythm                   Overall:                   no retractions       

                          2018                   None                

 

                    Full Exam - General                   Respiratory           

        respiratory 

effort/rhythm                   Overall:                   normal rate          

                          2018                   None                

 

                    Full Exam - General                   Respiratory           

        auscultation

                          Overall:                   breath sounds clear bilater

ally    

                          2018                   None                

 

                    Full Exam - General                   Ears/Nose/Throat      

             

otoscopic exam                   Overall:                   external auditory 

canals clear                   2018                   None                

 

                    Full Exam - General                   Ears/Nose/Throat      

             

otoscopic exam                   Overall:                   tympanic membranes 

clear                     2018                   None                

 

                    Full Exam - General                   Ears/Nose/Throat      

             oral 

cavity/pharynx/larynx                    Oropharynx:                   a normal 

exam                      2018                   None                

 

                    Full Exam - General                   Cardiovascular        

           

inspection of carotid pulses                   Overall:                   

strong, bilaterally equal, no bruits                   2018               

                                        None                

 

                    Full Exam - General                   Cardiovascular        

           palpation

of heart                   Overall:                   apical impulse location 

normal                    2018                   None                

 

                    Full Exam - General                   Cardiovascular        

           palpation

of heart                   Overall:                   no heave/lift             

                          2018                   None                

 

                    Full Exam - General                   Lymphatic             

      neck nodes    

                          Overall:                   anterior cervical chain aruna

ign         

                          2018                   None                

 

                    Full Exam - General                   Lymphatic             

      neck nodes    

                          Overall:                   posterior cervical chain be

nign        

                          2018                   None                

 

                    Full Exam - General                   Neurologic            

       deep tendon 

reflexes                   Overall:                   deep tendon reflexes 

intact                    2018                   None                

 

                    Full Exam - General                   Neurologic            

       mental status

                    Overall:                   alert                   

8 

                                        None                

 

                    Full Exam - General                   Neurologic            

       mental status

                    Overall:                   oriented                   

2018                              None                

 

                    Full Exam - General                   Neurologic            

       cranial 

nerves                    Overall:                   cranial nerves 1-12 intact 

 

                          2018                   None                

 

                    Full Exam - General                   Psychiatric           

        

orientation/consciousness                   Overall:                   oriented 

to person, place and time                   2018                   None   

            

 

                    Full Exam - General                   Psychiatric           

        

behavior/psychomotor activity                   Overall:                   no 

tics, normal psychomotor activity                   2018                  

                                        None                

 

                    Full Exam - General                   Psychiatric           

        mood and 

affect                    Overall:                   normal mood and affect     

 

                          2018                   None                

 

                    Full Exam - General                   Psychiatric           

        appearance  

                          Overall:                   well-groomed, good eye cont

act       

                          2018                   None                

 

                    Full Exam - General                   Psychiatric           

        speech      

                          Overall:                   normal quality, no aphasia 

              

                          2018                   None                

 

                    Full Exam - General                   Psychiatric           

        speech      

                          Overall:                   normal quality, quantity, r

ate           

                          2018                   None                

 

                    Full Exam - General                   Psychiatric           

        attention   

                          Overall:                   normal digit recall and num

debo 

repeating                   2018                   None                

 

                    Full Exam - General                   Constitutional        

            general 

appearance                   Overall:                   well nourished          

                          2018                   None                

 

                    Full Exam - General                   Constitutional        

            general 

appearance                   Overall:                   well developed          

                          2018                   None                

 

                    Full Exam - General                   Constitutional        

            general 

appearance                   Overall:                   in no acute distress    

                          2018                   None                

 

                    Full Exam - General                   Musculoskeletal       

            head and

neck                      Cervical Spine:                   a normal exam       

   

                          2018                   None                

 

                    Full Exam - General                   Musculoskeletal       

            head and

neck                   Head:                   atraumatic                   

2018                              None                

 

                    Full Exam - General                   Musculoskeletal       

            head and

neck                   Head:                   normocephalic                   

2018                              None                

 

                    Full Exam - General                   Musculoskeletal       

            head and

neck                      Overall:                   head atraumatic            

   

                          2018                   None                

 

                    Full Exam - General                   Musculoskeletal       

            head and

neck                      Overall:                   cervical spine benign      

   

                          2018                   None                

 

                    Full Exam - General                   Neurologic            

       mental status

                    Overall:                   alert                   

8 

                                        None                

 

                    Full Exam - General                   Neurologic            

       mental status

                    Overall:                   oriented                   

2018                              None                

 

                    Full Exam - General                   Psychiatric           

        mood and 

affect                    Overall:                   normal mood and affect     

 

                          2018                   None                

 

                    Full Exam - General                   Constitutional        

            general 

appearance                   Overall:                   well nourished          

                          2018                   None                

 

                    Full Exam - General                   Constitutional        

            general 

appearance                   Overall:                   well developed          

                          2018                   None                

 

                    Full Exam - General                   Constitutional        

            general 

appearance                   Overall:                   in no acute distress    

                          2018                   None                

 

                    Full Exam - General                   Neurologic            

       mental status

                    Overall:                   alert                   

8 

                                        None                

 

                    Full Exam - General                   Neurologic            

       mental status

                    Overall:                   oriented                   

2018                              None                

 

                    Full Exam - General                   Psychiatric           

        mood and 

affect                    Overall:                   normal mood and affect     

 

                          2018                   None                

 

                    Full Exam - General                   Respiratory           

        auscultation

                          Overall:                   breath sounds clear bilater

ally    

                          2018                   None                

 

                    Full Exam - General                   Cardiovascular        

           

auscultation of heart                   Overall:                   regular rate 

                          2018                   None                

 

                    Full Exam - General                   Cardiovascular        

           

auscultation of heart                   Overall:                   normal heart 

sounds                    2018                   None                

 

                    Full Exam - General                   Cardiovascular        

           

auscultation of heart                   S4 (atrial gallop):                   

present                   2018                   None                

 

                    Full Exam - General                   Cardiovascular        

           

auscultation of heart                   Murmur:                   previously 

known murmur unchanged                   2018                   None      

         

 

                    Full Exam - General                   Musculoskeletal       

            spine, 

ribs and pelvis                   Spine:                    tender @ cervical 

spine                     2018                   None                

 

                    Full Exam - General                   Constitutional        

            general 

appearance                   Overall:                   well nourished          

                          2018                   None                

 

                    Full Exam - General                   Constitutional        

            general 

appearance                   Overall:                   in no acute distress    

                          2018                   None                

 

                    Full Exam - General                   Cardiovascular        

           

auscultation of heart                   Overall:                   regular rate 

                          2018                   None                

 

                    Full Exam - General                   Respiratory           

        percussion  

                    Overall:                   benign percussion                

   

2018                              None                

 

                    Full Exam - General                   Respiratory           

        respiratory 

effort/rhythm                   Overall:                   no retractions       

                          2018                   None                

 

                    Full Exam - General                   Respiratory           

        respiratory 

effort/rhythm                   Overall:                   normal rate          

                          2018                   None                

 

                    Full Exam - General                   Lymphatic             

      other nodes   

                          Right auricular chain:                   tender       

           

                          2018                   None                

 

                    Full Exam - General                   Lymphatic             

      other nodes   

                    Left supraclavicular:                   tender              

     

2018                              None                

 

                    Full Exam - General                   Ears/Nose/Throat      

             

otoscopic exam                   Left tympanic membrane:                   air-

fluid level                   2018                   None                

 

                    Full Exam - General                   Ears/Nose/Throat      

             

otoscopic exam                   Right tympanic membrane:                   air-

fluid level                   2018                   None                

 

                    Full Exam - General                   Neurologic            

       mental status

                    Overall:                   alert                   

8 

                                        None                

 

                    Full Exam - General                   Neurologic            

       mental status

                    Overall:                   oriented                   

2018                              None                

 

                    Full Exam - General                   Constitutional        

            general 

appearance                   Overall:                   well nourished          

                          2018                   None                

 

                    Full Exam - General                   Constitutional        

            general 

appearance                   Overall:                   well developed          

                          2018                   None                

 

                    Full Exam - General                   Constitutional        

            general 

appearance                   Overall:                   in no acute distress    

                          2018                   None                

 

                    Full Exam - General                   Neurologic            

       mental status

                    Overall:                   alert                   

8 

                                        None                

 

                    Full Exam - General                   Neurologic            

       mental status

                    Overall:                   oriented                   

2018                              None                

 

                    Full Exam - General                   Psychiatric           

        mood and 

affect                    Overall:                   normal mood and affect     

 

                          2018                   None                

 

                    Full Exam - General                   Musculoskeletal       

            spine, 

ribs and pelvis                   Spine:                    tender @ lumbar spin

e

                          2018                   None                

 

                    Full Exam - General                   Abdomen               

    abdominal exam  

                    Overall:                   no masses                   

2018                              None                

 

                    Full Exam - General                   Abdomen               

    abdominal exam  

                          Overall:                   normal bowel sounds        

          

                          2018                   None                

 

                    Full Exam - General                   Abdomen               

    abdominal exam  

                    Overall:                   soft                   2018

    

                                        None                

 

                    Full Exam - General                   Abdomen               

    abdominal exam  

                          Left lower quadrant:                   tender to palpa

tion      

                          2018                   None                

 

                    Full Exam - General                   Constitutional        

            general 

appearance                   Overall:                   well nourished          

                          2017                   None                

 

                    Full Exam - General                   Constitutional        

            general 

appearance                   Overall:                   well developed          

                          2017                   None                

 

                    Full Exam - General                   Constitutional        

            general 

appearance                   Overall:                   in no acute distress    

                          2017                   None                

 

                    Full Exam - General                   Neurologic            

       mental status

                    Overall:                   alert                   

7 

                                        None                

 

                    Full Exam - General                   Neurologic            

       mental status

                    Overall:                   oriented                   

2017                              None                

 

                    Full Exam - General                   Psychiatric           

        mood and 

affect                    Overall:                   normal mood and affect     

 

                          2017                   None                

 

                    Full Exam - General                   Musculoskeletal       

            gait and

station                   Station:                   kyphosis                   

2017                              None                

 

                    Full Exam - General                   Constitutional        

            general 

appearance                   Overall:                   well nourished          

                          2017                   None                

 

                    Full Exam - General                   Constitutional        

            general 

appearance                   Overall:                   well developed          

                          2017                   None                

 

                    Full Exam - General                   Constitutional        

            general 

appearance                   Overall:                   in no acute distress    

                          2017                   None                

 

                    Full Exam - General                   Constitutional        

            general 

appearance                   Assistive Device:                   cane           

                          2017                   None                

 

                    Full Exam - General                   Neurologic            

       mental status

                    Overall:                   alert                   

7 

                                        None                

 

                    Full Exam - General                   Neurologic            

       mental status

                    Overall:                   oriented                   

2017                              None                

 

                    Full Exam - General                   Psychiatric           

        mood and 

affect                    Overall:                   normal mood and affect     

 

                          2017                   None                

 

                    Full Exam - General                   Abdomen               

    abdominal exam  

                    Overall:                   no masses                   

2017                              None                

 

                    Full Exam - General                   Abdomen               

    abdominal exam  

                          Overall:                   normal bowel sounds        

          

                          2017                   None                

 

                    Full Exam - General                   Abdomen               

    abdominal exam  

                    Overall:                   soft                   2017

    

                                        None                

 

                    Full Exam - General                   Abdomen               

    abdominal exam  

                          Left lower quadrant:                   tender to palpa

tion      

                          2017                   None                

 

                    Full Exam - General                   Abdomen               

    abdominal exam  

                          Right lower quadrant:                   tender to palp

ation     

                          2017                   None                

 

                    Full Exam - General                   Musculoskeletal       

            spine, 

ribs and pelvis                   Spine:                    tender @ lumbar spin

e

                          2017                   left                 

 

                    Full Exam - General                   Respiratory           

        auscultation

                          Overall:                   breath sounds clear bilater

ally    

                          2017                   None                

 

                    Full Exam - General                   Cardiovascular        

           

auscultation of heart                   Overall:                   regular rate 

                          2017                   None                

 

                    Full Exam - General                   Cardiovascular        

           

auscultation of heart                   Overall:                   normal heart 

sounds                    2017                   None                

 

                    Full Exam - General                   Cardiovascular        

           

auscultation of heart                   S4 (atrial gallop):                   

present                   2017                   None                

 

                    Full Exam - General                   Constitutional        

            general 

appearance                   Overall:                   well nourished          

                          2017                   None                

 

                    Full Exam - General                   Constitutional        

            general 

appearance                   Overall:                   well developed          

                          2017                   None                

 

                    Full Exam - General                   Constitutional        

            general 

appearance                   Overall:                   in no acute distress    

                          2017                   None                

 

                    Full Exam - General                   Neurologic            

       mental status

                    Overall:                   alert                   

7 

                                        None                

 

                    Full Exam - General                   Neurologic            

       mental status

                    Overall:                   oriented                   

2017                              None                

 

                    Full Exam - General                   Psychiatric           

        mood and 

affect                    Overall:                   normal mood and affect     

 

                          2017                   None                

 

                    Full Exam - General                   Respiratory           

        auscultation

                          Overall:                   breath sounds clear bilater

ally    

                          2017                   None                

 

                    Full Exam - General                   Cardiovascular        

           

auscultation of heart                   Overall:                   regular rate 

                          2017                   None                

 

                    Full Exam - General                   Cardiovascular        

           

auscultation of heart                   Overall:                   normal heart 

sounds                    2017                   None                

 

                    Full Exam - General                   Cardiovascular        

           

auscultation of heart                   S4 (atrial gallop):                   

present                   2017                   None                

 

                    Full Exam - General                   Cardiovascular        

           

auscultation of heart                   Murmur:                   previously 

known murmur unchanged                   2017                   None      

         

 

                    Full Exam - General                   Cardiovascular        

           

extremities                   Overall:                   no clubbing            

                          2017                   None                

 

                    Full Exam - General                   Cardiovascular        

           

extremities                   Overall:                   No cyanosis            

                          2017                   None                

 

                    Full Exam - General                   Cardiovascular        

           

extremities                   Edema present:                   non-pitting      

                          2017                   None                

 

                    Full Exam - General                   Neck                  

 inspection of neck 

                    Overall:                   normal size                   

2017                              None                

 

                    Full Exam - General                   Neck                  

 inspection of neck 

                    Overall:                   no masses                   

2017                              None                

 

                    Full Exam - General                   Chest/Breast          

         breast and 

axillae palpation                   Overall:                   no masses        

                          2017                   None                

 

                    Full Exam - General                   Chest/Breast          

         breast and 

axillae palpation                   Overall:                   axillae non-

tender                    2017                   None                

 

                    Full Exam - General                   Chest/Breast          

         breast and 

axillae palpation                   Overall:                   no nipple 

discharge                   2017                   None                

 

                    Full Exam - General                   Chest/Breast          

         breast and 

axillae palpation                       Left upper inner quadrant:              

    

                    tender                   2017                   None  

              

 

                    Full Exam - General                   Chest/Breast          

         breast and 

axillae palpation                       Left upper outer quadrant:              

    

                    tender                   2017                   None  

              

 

                    Full Exam - General                   Chest/Breast          

         breast and 

axillae palpation                       Left lower inner quadrant:              

    

                    tender                   2017                   None  

              

 

                    Full Exam - General                   Chest/Breast          

         breast and 

axillae palpation                       Left lower outer quadrant:              

    

                    tender                   2017                   None  

              

 

                    Full Exam - General                   Chest/Breast          

         breast and 

axillae palpation                       Right upper inner quadrant:             

    

                    tender                   2017                   None  

              

 

                    Full Exam - General                   Chest/Breast          

         breast and 

axillae palpation                       Right upper outer quadrant:             

    

                    tender                   2017                   None  

              

 

                    Full Exam - General                   Chest/Breast          

         breast and 

axillae palpation                       Right lower inner quadrant:             

    

                    tender                   2017                   None  

              

 

                    Full Exam - General                   Chest/Breast          

         breast and 

axillae palpation                       Right lower outer quadrant:             

    

                    tender                   2017                   None  

              

 

                    Full Exam - General                   Musculoskeletal       

            gait and

station                   Station:                   kyphosis                   

2017                              None                

 

                    Full Exam - General                   Musculoskeletal       

            spine, 

ribs and pelvis                   Chest wall palpation:                   chest 

wall pain                   2017                   bilateral costochondral

junction                

 

                    Full Exam - General                   Constitutional        

            general 

appearance                   Overall:                   well nourished          

                          2017                   None                

 

                    Full Exam - General                   Constitutional        

            general 

appearance                   Overall:                   well developed          

                          2017                   None                

 

                    Full Exam - General                   Constitutional        

            general 

appearance                   Overall:                   in no acute distress    

                          2017                   None                

 

                    Full Exam - General                   Constitutional        

            general 

appearance                   Nourishment:                   well nourished      

                          2017                   None                

 

                    Full Exam - General                   Constitutional        

            general 

appearance                   Evidence of Distress:                   in no acute

distress                   2017                   None                

 

                    Full Exam - General                   Eyes                  

 conjunctiva/eyelids

                          Overall:                   conjunctiva clear          

        

                          2017                   None                

 

                    Full Exam - General                   Eyes                  

 conjunctiva/eyelids

                    Overall:                   cornea clear                   

2017                              None                

 

                    Full Exam - General                   Eyes                  

 conjunctiva/eyelids

                    Overall:                   eyelids normal                   

2017                              None                

 

                    Full Exam - General                   Eyes                  

 pupils and irises  

                          Overall:                   pupils equal, round, reacti

ve to 

light and accomodation                   2017                   None      

         

 

                    Full Exam - General                   Ears/Nose/Throat      

             

external ear                   Overall:                   normal appearance     

                          2017                   None                

 

                    Full Exam - General                   Ears/Nose/Throat      

             

external nose                   Overall:                   benign appearance    

                          2017                   None                

 

                    Full Exam - General                   Ears/Nose/Throat      

             

otoscopic exam                   Overall:                   external auditory 

canals clear                   2017                   None                

 

                    Full Exam - General                   Ears/Nose/Throat      

             

otoscopic exam                   Left tympanic membrane:                   air-

fluid level                   2017                   None                

 

                    Full Exam - General                   Ears/Nose/Throat      

             

otoscopic exam                   Right tympanic membrane:                   air-

fluid level                   2017                   None                

 

                    Full Exam - General                   Ears/Nose/Throat      

             

internal nose                   Left nasal cavity:                   mucosal 

edema                     2017                   None                

 

                    Full Exam - General                   Ears/Nose/Throat      

             

internal nose                   Right nasal cavity:                   mucosal 

edema                     2017                   None                

 

                    Full Exam - General                   Ears/Nose/Throat      

             

lips/teeth/gingiva                   Overall:                   benign lips     

                          2017                   None                

 

                    Full Exam - General                   Ears/Nose/Throat      

             

lips/teeth/gingiva                   Overall:                   normal dentition

                          2017                   None                

 

                    Full Exam - General                   Ears/Nose/Throat      

             oral 

cavity/pharynx/larynx                    Oropharynx:                   a normal 

exam                      2017                   None                

 

                    Full Exam - General                   Respiratory           

        auscultation

                          Overall:                   breath sounds clear bilater

ally    

                          2017                   None                

 

                    Full Exam - General                   Cardiovascular        

           

auscultation of heart                   Overall:                   regular rate 

                          2017                   None                

 

                    Full Exam - General                   Cardiovascular        

           

auscultation of heart                   Overall:                   normal heart 

sounds                    2017                   None                

 

                    Full Exam - General                   Cardiovascular        

           

auscultation of heart                   Overall:                   no murmurs   

                          2017                   None                

 

                    Full Exam - General                   Integument            

       inspection of

skin                      Overall:                   no rash, lesions           

   

                          2017                   None                

 

                    Full Exam - General                   Psychiatric           

        mood and 

affect                    Overall:                   normal mood and affect     

 

                          2017                   None                

 

                    Full Exam - General                   Ears/Nose/Throat      

             

otoscopic exam                   Otorrhea:                   absent             

                          2017                   None                

 

                    Full Exam - General                   Ears/Nose/Throat      

             

otoscopic exam                   Perforation:                   absent          

                          2017                   None                

 

                    Full Exam - General                   Ears/Nose/Throat      

             

internal nose                   Overall:                   no sinus tenderness  

                          2017                   None                

 

                    Full Exam - General                   Ears/Nose/Throat      

             

internal nose                   Overall:                   no drainage          

                          2017                   None                

 

                    Full Exam - General                   Ears/Nose/Throat      

             oral 

cavity/pharynx/larynx                    Overall:                   oral mucosa 

clear                     2017                   None                

 

                    Full Exam - General                   Respiratory           

        respiratory 

effort/rhythm                   Overall:                   no retractions       

                          2017                   None                

 

                    Full Exam - General                   Respiratory           

        respiratory 

effort/rhythm                   Overall:                   normal rate          

                          2017                   None                

 

                    Full Exam - General                   Lymphatic             

      neck nodes    

                          Overall:                   shotty lymphadenopathy     

            

                          2017                   tender but no enlargement

 palpable                

 

                    Full Exam - General                   Constitutional        

            general 

appearance                   Overall:                   well nourished          

                          2016                   None                

 

                    Full Exam - General                   Constitutional        

            general 

appearance                   Overall:                   well developed          

                          2016                   None                

 

                    Full Exam - General                   Constitutional        

            general 

appearance                   Overall:                   in no acute distress    

                          2016                   None                

 

                    Full Exam - General                   Ears/Nose/Throat      

             

otoscopic exam                   Overall:                   external auditory 

canals clear                   2016                   None                

 

                    Full Exam - General                   Ears/Nose/Throat      

             

internal nose                   Drainage:                   clear               

                          2016                   None                

 

                    Full Exam - General                   Ears/Nose/Throat      

             oral 

cavity/pharynx/larynx                    Oropharynx:                   postnasal

drainage                   2016                   None                

 

                    Full Exam - General                   Respiratory           

        auscultation

                          Overall:                   breath sounds clear bilater

ally    

                          2016                   None                

 

                    Full Exam - General                   Cardiovascular        

           

auscultation of heart                   Overall:                   regular rate 

                          2016                   None                

 

                    Full Exam - General                   Cardiovascular        

           

auscultation of heart                   Overall:                   normal heart 

sounds                    2016                   None                

 

                    Full Exam - General                   Cardiovascular        

           

auscultation of heart                   Overall:                   no murmurs   

                          2016                   None                

 

                    Full Exam - General                   Neurologic            

       mental status

                    Overall:                   alert                   

6 

                                        None                

 

                    Full Exam - General                   Neurologic            

       mental status

                    Overall:                   oriented                   

2016                              None                

 

                    Full Exam - General                   Psychiatric           

        mood and 

affect                    Overall:                   normal mood and affect     

 

                          2016                   None                

 

                    Full Exam - General                   Constitutional        

            general 

appearance                   Overall:                   well nourished          

                          2016                   None                

 

                    Full Exam - General                   Constitutional        

            general 

appearance                   Overall:                   well developed          

                          2016                   None                

 

                    Full Exam - General                   Constitutional        

            general 

appearance                   Overall:                   in no acute distress    

                          2016                   None                

 

                    Full Exam - General                   Neurologic            

       mental status

                    Overall:                   alert                   

6 

                                        None                

 

                    Full Exam - General                   Neurologic            

       mental status

                    Overall:                   oriented                   

2016                              None                

 

                    Full Exam - General                   Psychiatric           

        mood and 

affect                    Overall:                   normal mood and affect     

 

                          2016                   None                

 

                    Full Exam - General                   Abdomen               

    abdominal exam  

                    Overall:                   no masses                   

2016                              None                

 

                    Full Exam - General                   Abdomen               

    abdominal exam  

                          Overall:                   normal bowel sounds        

          

                          2016                   None                

 

                    Full Exam - General                   Abdomen               

    abdominal exam  

                    Overall:                   soft                   2016

    

                                        None                

 

                    Full Exam - General                   Abdomen               

    abdominal exam  

                    Overall:                   no tenderness                   

2016                              None                

 

                    Full Exam - General                   Constitutional        

            general 

appearance                   Overall:                   well nourished          

                          2016                   None                

 

                    Full Exam - General                   Constitutional        

            general 

appearance                   Overall:                   well developed          

                          2016                   None                

 

                    Full Exam - General                   Constitutional        

            general 

appearance                   Overall:                   in no acute distress    

                          2016                   None                

 

                    Full Exam - General                   Neurologic            

       mental status

                    Overall:                   alert                   

6 

                                        None                

 

                    Full Exam - General                   Neurologic            

       mental status

                    Overall:                   oriented                   

2016                              None                

 

                    Full Exam - General                   Psychiatric           

        mood and 

affect                    Overall:                   normal mood and affect     

 

                          2016                   None                

 

                    Full Exam - General                   Respiratory           

        auscultation

                          Overall:                   breath sounds clear bilater

ally    

                          2016                   None                

 

                    Full Exam - General                   Cardiovascular        

           

auscultation of heart                   Overall:                   regular rate 

                          2016                   None                

 

                    Full Exam - General                   Cardiovascular        

           

auscultation of heart                   Overall:                   normal heart 

sounds                    2016                   None                

 

                    Full Exam - General                   Cardiovascular        

           

auscultation of heart                   S3 (ventricular gallop):                

                    present                   2016                   None 

               

 

                    Full Exam - General                   Cardiovascular        

           

auscultation of heart                   Murmur:                   previously 

known murmur unchanged                   2016                   None      

         

 

                    Full Exam - General                   Cardiovascular        

           

extremities                   Overall:                   no clubbing            

                          2016                   None                

 

                    Full Exam - General                   Cardiovascular        

           

extremities                   Overall:                   No cyanosis            

                          2016                   None                

 

                    Full Exam - General                   Cardiovascular        

           

extremities                   Overall:                   No edema               

                          2016                   None                

 

                    Full Exam - General                   Constitutional        

            general 

appearance                   Overall:                   well nourished          

                          2015                   None                

 

                    Full Exam - General                   Constitutional        

            general 

appearance                   Overall:                   well developed          

                          2015                   None                

 

                    Full Exam - General                   Constitutional        

            general 

appearance                   Overall:                   in no acute distress    

                          2015                   None                

 

                    Full Exam - General                   Ears/Nose/Throat      

             

otoscopic exam                   Overall:                   external auditory 

canals clear                   2015                   None                

 

                    Full Exam - General                   Ears/Nose/Throat      

             

internal nose                   Drainage:                   clear               

                          2015                   None                

 

                    Full Exam - General                   Ears/Nose/Throat      

             oral 

cavity/pharynx/larynx                    Oropharynx:                   postnasal

drainage                   2015                   None                

 

                    Full Exam - General                   Respiratory           

        auscultation

                          Overall:                   breath sounds clear bilater

ally    

                          2015                   None                

 

                    Full Exam - General                   Cardiovascular        

           

auscultation of heart                   Overall:                   regular rate 

                          2015                   None                

 

                    Full Exam - General                   Cardiovascular        

           

auscultation of heart                   Overall:                   normal heart 

sounds                    2015                   None                

 

                    Full Exam - General                   Cardiovascular        

           

auscultation of heart                   Overall:                   no murmurs   

                          2015                   None                

 

                    Full Exam - General                   Neurologic            

       mental status

                    Overall:                   alert                   

5 

                                        None                

 

                    Full Exam - General                   Neurologic            

       mental status

                    Overall:                   oriented                   

2015                              None                

 

                    Full Exam - General                   Psychiatric           

        mood and 

affect                    Overall:                   normal mood and affect     

 

                          2015                   None                

 

                    Full Exam - General                   Abdomen               

    abdominal exam  

                    Overall:                   no masses                   

2015                              None                

 

                    Full Exam - General                   Abdomen               

    abdominal exam  

                    Overall:                   no tenderness                   

2015                              None                

 

                    Full Exam - General                   Abdomen               

    abdominal exam  

                          Overall:                   normal bowel sounds        

          

                          2015                   None                

 

                    Full Exam - General                   Abdomen               

    abdominal exam  

                    Overall:                   soft                   2015

    

                                        None                

 

                    Full Exam - General                   Musculoskeletal       

            gait and

station                   Overall:                   normal gait                

                          2015                   None                

 

                    Full Exam - General                   Musculoskeletal       

            gait and

station                   Station:                   kyphosis                   

2015                              None                

 

                    Full Exam - General                   Musculoskeletal       

            spine, 

ribs and pelvis                   Spine:                    tender @ thoracic 

spine                     2015                   None                

 

                    Full Exam - General                   Musculoskeletal       

            spine, 

ribs and pelvis                   Spine:                    tender @ lumbar spin

e

                          2015                   None                

 

                    Full Exam - General                   Ears/Nose/Throat      

             

otoscopic exam                   Left tympanic membrane:                   a 

normal exam                   2015                   None                

 

                    Full Exam - General                   Ears/Nose/Throat      

             

otoscopic exam                   Right tympanic membrane:                   a 

normal exam                   2015                   None                

 

                    Full Exam - General                   Constitutional        

            general 

appearance                   Overall:                   well nourished          

                          2015                   None                

 

                    Full Exam - General                   Constitutional        

            general 

appearance                   Overall:                   well developed          

                          2015                   None                

 

                    Full Exam - General                   Constitutional        

            general 

appearance                   Evidence of Distress:                   tearful    

                          2015                   None                

 

                    Full Exam - General                   Neurologic            

       mental status

                    Overall:                   alert                   

5 

                                        None                

 

                    Full Exam - General                   Neurologic            

       mental status

                    Overall:                   oriented                   

2015                              None                

 

                    Full Exam - General                   Psychiatric           

        mood and 

affect                   Mood:                   depressed                   

2015                              tearful                

 

                    Full Exam - General                   Respiratory           

        auscultation

                          Overall:                   breath sounds clear bilater

ally    

                          2015                   None                

 

                    Full Exam - General                   Cardiovascular        

           

auscultation of heart                   Overall:                   regular rate 

                          2015                   None                

 

                    Full Exam - General                   Cardiovascular        

           

auscultation of heart                   Overall:                   normal heart 

sounds                    2015                   None                

 

                    Full Exam - General                   Cardiovascular        

           

auscultation of heart                   S3 (ventricular gallop):                

                    present                   2015                   None 

               

 

                    Full Exam - General                   Cardiovascular        

           

auscultation of heart                   Murmur:                   previously 

known murmur unchanged                   2015                   None      

         

 

                    Full Exam - General                   Cardiovascular        

           

extremities                   Overall:                   no clubbing            

                          2015                   None                

 

                    Full Exam - General                   Cardiovascular        

           

extremities                   Overall:                   No edema               

                          2015                   None                

 

                    Full Exam - General                   Cardiovascular        

           

extremities                   Overall:                   No cyanosis            

                          2015                   None                

 

                    Full Exam - General                   Constitutional        

            general 

appearance                   Overall:                   well nourished          

                          2015                   None                

 

                    Full Exam - General                   Constitutional        

            general 

appearance                   Overall:                   well developed          

                          2015                   None                

 

                    Full Exam - General                   Constitutional        

            general 

appearance                   Overall:                   in no acute distress    

                          2015                   None                

 

                    Full Exam - General                   Ears/Nose/Throat      

             oral 

cavity/pharynx/larynx                    Overall:                   oral mucosa 

clear                     2015                   None                

 

                    Full Exam - General                   Ears/Nose/Throat      

             

otoscopic exam                          Left external auditory canal:           

       

                    complete cerumen impaction                   2015     

              None  

             

 

                    Full Exam - General                   Ears/Nose/Throat      

             

otoscopic exam                          Right external auditory canal:          

       

                    complete cerumen impaction                   2015     

              None 

              

 

                    Full Exam - General                   Ears/Nose/Throat      

             

otoscopic exam                   Right tympanic membrane:                   

abnormal light reflex                   2015                   None       

        

 

                    Full Exam - General                   Ears/Nose/Throat      

             

otoscopic exam                   Right tympanic membrane:                   air-

fluid level                   2015                   secondary to cerumen 

removal                 

 

                    Full Exam - General                   Respiratory           

        auscultation

                          Overall:                   breath sounds clear bilater

ally    

                          2015                   None                

 

                    Full Exam - General                   Neurologic            

       mental status

                    Overall:                   alert                   

5 

                                        None                

 

                    Full Exam - General                   Neurologic            

       mental status

                    Overall:                   oriented                   

2015                              None                

 

                    Full Exam - General                   Psychiatric           

        mood and 

affect                    Overall:                   normal mood and affect     

 

                          2015                   None                

 

                    Full Exam - General                   Ears/Nose/Throat      

             

otoscopic exam                   Left tympanic membrane:                   

abnormal light reflex                   2015                   None       

        

 

                    Full Exam - General                   Cardiovascular        

           

auscultation of heart                   S3 (ventricular gallop):                

                    present                   2015                   None 

               

 

                    Full Exam - General                   Cardiovascular        

           

auscultation of heart                   Overall:                   regular rate 

                          2015                   None                

 

                    Full Exam - General                   Constitutional        

            general 

appearance                   Overall:                   well nourished          

                          2015                   None                

 

                    Full Exam - General                   Constitutional        

            general 

appearance                   Overall:                   well developed          

                          2015                   None                

 

                    Full Exam - General                   Constitutional        

            general 

appearance                   Overall:                   in no acute distress    

                          2015                   None                

 

                    Full Exam - General                   Neurologic            

       mental status

                    Overall:                   oriented                   

2015                              None                

 

                    Full Exam - General                   Neurologic            

       mental status

                    Overall:                   alert                   

5 

                                        None                

 

                    Full Exam - General                   Psychiatric           

        mood and 

affect                    Overall:                   normal mood and affect     

 

                          2015                   None                

 

                    Full Exam - General                   Musculoskeletal       

            right 

lower extremity                         Inspection - right lower leg:           

      

                    swelling                   2015                   mini

mal                

 

                    Full Exam - General                   Musculoskeletal       

            right 

lower extremity                         Palpation - right lower leg:            

      

                    tender                   2015                   medial

ly                

 

                    Full Exam - General                   Constitutional        

            general 

appearance                   Overall:                   well nourished          

                          2015                   None                

 

                    Full Exam - General                   Constitutional        

            general 

appearance                   Overall:                   well developed          

                          2015                   None                

 

                    Full Exam - General                   Constitutional        

            general 

appearance                   Overall:                   in no acute distress    

                          2015                   None                

 

                    Full Exam - General                   Neurologic            

       mental status

                    Overall:                   alert                   

5 

                                        None                

 

                    Full Exam - General                   Neurologic            

       mental status

                    Overall:                   oriented                   

2015                              None                

 

                    Full Exam - General                   Psychiatric           

        mood and 

affect                    Overall:                   normal mood and affect     

 

                          2015                   None                

 

                    Full Exam - General                   Respiratory           

        auscultation

                          Overall:                   breath sounds clear bilater

ally    

                          2015                   None                

 

                    Full Exam - General                   Cardiovascular        

           

auscultation of heart                   Overall:                   regular rate 

                          2015                   None                

 

                    Full Exam - General                   Cardiovascular        

           

auscultation of heart                   Overall:                   normal heart 

sounds                    2015                   None                

 

                    Full Exam - General                   Cardiovascular        

           

auscultation of heart                   S3 (ventricular gallop):                

                    present                   2015                   None 

               

 

                    Full Exam - General                   Cardiovascular        

           

auscultation of heart                   Murmur:                   previously 

known murmur unchanged                   2015                   None      

         

 

                    Full Exam - General                   Musculoskeletal       

            spine, 

ribs and pelvis                   Spine:                    tender @ thoracic 

spine                     2015                   None                

 

                    Full Exam - General                   Constitutional        

            general 

appearance                   Overall:                   well nourished          

                          2015                   None                

 

                    Full Exam - General                   Constitutional        

            general 

appearance                   Overall:                   well developed          

                          2015                   None                

 

                    Full Exam - General                   Constitutional        

            general 

appearance                   Overall:                   in no acute distress    

                          2015                   None                

 

                    Full Exam - General                   Neurologic            

       mental status

                    Overall:                   alert                   

5 

                                        None                

 

                    Full Exam - General                   Neurologic            

       mental status

                    Overall:                   oriented                   

2015                              None                

 

                    Full Exam - General                   Psychiatric           

        mood and 

affect                    Overall:                   normal mood and affect     

 

                          2015                   None                

 

                    Full Exam - General                   Respiratory           

        auscultation

                          Overall:                   breath sounds clear bilater

ally    

                          2015                   None                

 

                    Full Exam - General                   Cardiovascular        

           

auscultation of heart                   Overall:                   regular rate 

                          2015                   None                

 

                    Full Exam - General                   Cardiovascular        

           

auscultation of heart                   Overall:                   normal heart 

sounds                    2015                   None                

 

                    Full Exam - General                   Cardiovascular        

           

auscultation of heart                   S3 (ventricular gallop):                

                    present                   2015                   None 

               

 

                    Full Exam - General                   Cardiovascular        

           

auscultation of heart                   Murmur:                   previously 

known murmur unchanged                   2015                   None      

         

 

                    Full Exam - General                   Cardiovascular        

           

extremities                   Overall:                   no clubbing            

                          2015                   None                

 

                    Full Exam - General                   Cardiovascular        

           

extremities                   Overall:                   No edema               

                          2015                   None                

 

                    Full Exam - General                   Cardiovascular        

           

extremities                   Overall:                   No cyanosis            

                          2015                   None                

 

                    Full Exam - General                   Abdomen               

    abdominal exam  

                    Overall:                   no masses                   

2015                              None                

 

                    Full Exam - General                   Abdomen               

    abdominal exam  

                    Overall:                   no tenderness                   

2015                              None                

 

                    Full Exam - General                   Abdomen               

    abdominal exam  

                          Overall:                   normal bowel sounds        

          

                          2015                   None                

 

                    Full Exam - General                   Abdomen               

    abdominal exam  

                    Overall:                   soft                   2015

    

                                        None                

 

                    Full Exam - General                   Integument            

       inspection of

skin                   Location:                   right arm                   

2015                              upper posterior with irregular nevus    

           



 

                    Full Exam - General                   Ears/Nose/Throat      

             

internal nose                   Turbinates:                   erythema          

                          2014                   None                

 

                    Full Exam - General                   Ears/Nose/Throat      

             oral 

cavity/pharynx/larynx                    Oropharynx:                   postnasal

drainage                   2014                   None                

 

                    Full Exam - General                   Ears/Nose/Throat      

             oral 

cavity/pharynx/larynx                    Oropharynx:                   erythema 

                          2014                   None                

 

                    Full Exam - General                   Constitutional        

            general 

appearance                   Overall:                   well nourished          

                          2014                   None                

 

                    Full Exam - General                   Constitutional        

            general 

appearance                   Overall:                   in no acute distress    

                          2014                   None                

 

                    Full Exam - General                   Respiratory           

        auscultation

                          Overall:                   breath sounds clear bilater

ally    

                          2014                   None                

 

                    Full Exam - General                   Cardiovascular        

           

auscultation of heart                   Overall:                   regular rate 

                          2014                   None                

 

                    Full Exam - General                   Cardiovascular        

           

auscultation of heart                   Overall:                   normal heart 

sounds                    2014                   None                

 

                    Full Exam - General                   Cardiovascular        

           

auscultation of heart                   Overall:                   no murmurs   

                          2014                   None                

 

                    Full Exam - General                   Psychiatric           

        

orientation/consciousness                   Overall:                   oriented 

to person, place and time                   2014                   None   

            

 

                    Full Exam - General                   Ears/Nose/Throat      

             

otoscopic exam                   Right tympanic membrane:                   

effusion                   2014                   None                

 

                    Full Exam - General                   Ears/Nose/Throat      

             

otoscopic exam                   Left tympanic membrane:                   a 

normal exam                   2014                   None                

 

                    Full Exam - General                   Ears/Nose/Throat      

             

otoscopic exam                   Right tympanic membrane:                   loss

of landmarks                   2014                   None                

 

                    Full Exam - General                   Constitutional        

            general 

appearance                   Overall:                   well nourished          

                          2014                   None                

 

                    Full Exam - General                   Constitutional        

            general 

appearance                   Overall:                   well developed          

                          2014                   None                

 

                    Full Exam - General                   Constitutional        

            general 

appearance                   Overall:                   in no acute distress    

                          2014                   None                

 

                    Full Exam - General                   Neurologic            

       mental status

                    Overall:                   alert                   

4 

                                        None                

 

                    Full Exam - General                   Neurologic            

       mental status

                    Overall:                   oriented                   

2014                              None                

 

                    Full Exam - General                   Psychiatric           

        mood and 

affect                    Overall:                   normal mood and affect     

 

                          2014                   None                

 

                    Full Exam - General                   Respiratory           

        auscultation

                          Overall:                   breath sounds clear bilater

ally    

                          2014                   None                

 

                    Full Exam - General                   Cardiovascular        

           

auscultation of heart                   Overall:                   regular rate 

                          2014                   None                

 

                    Full Exam - General                   Cardiovascular        

           

auscultation of heart                   Overall:                   normal heart 

sounds                    2014                   None                

 

                    Full Exam - General                   Cardiovascular        

           

auscultation of heart                   S3 (ventricular gallop):                

                    present                   2014                   None 

               

 

                    Full Exam - General                   Cardiovascular        

           

auscultation of heart                   Murmur:                   previously 

known murmur unchanged                   2014                   None      

         

 

                    Full Exam - General                   Cardiovascular        

           

extremities                   Overall:                   no clubbing            

                          2014                   None                

 

                    Full Exam - General                   Cardiovascular        

           

extremities                   Overall:                   No cyanosis            

                          2014                   None                

 

                    Full Exam - General                   Cardiovascular        

           

extremities                   Edema present:                   non-pitting      

                          2014                   None                

 

                    Full Exam - General                   Constitutional        

            general 

appearance                   Overall:                   well nourished          

                          2014                   None                

 

                    Full Exam - General                   Constitutional        

            general 

appearance                   Overall:                   well developed          

                          2014                   None                

 

                    Full Exam - General                   Constitutional        

            general 

appearance                   Overall:                   in no acute distress    

                          2014                   None                

 

                    Full Exam - General                   Neurologic            

       mental status

                    Overall:                   alert                   

4 

                                        None                

 

                    Full Exam - General                   Neurologic            

       mental status

                    Overall:                   oriented                   

2014                              None                

 

                    Full Exam - General                   Psychiatric           

        mood and 

affect                    Overall:                   normal mood and affect     

 

                          2014                   None                

 

                    Full Exam - General                   Ears/Nose/Throat      

             

otoscopic exam                   Overall:                   external auditory 

canals clear                   2014                   None                

 

                    Full Exam - General                   Ears/Nose/Throat      

             

otoscopic exam                   Overall:                   tympanic membranes 

clear                     2014                   None                

 

                    Full Exam - General                   Ears/Nose/Throat      

             

internal nose                   Turbinates:                   hypertrophy       

                          2014                   None                

 

                    Full Exam - General                   Ears/Nose/Throat      

             oral 

cavity/pharynx/larynx                    Overall:                   oral mucosa 

clear                     2014                   None                

 

                    Full Exam - General                   Respiratory           

        auscultation

                          Overall:                   breath sounds clear bilater

ally    

                          2014                   None                

 

                    Full Exam - General                   Cardiovascular        

           

auscultation of heart                   Overall:                   regular rate 

                          2014                   None                

 

                    Full Exam - General                   Cardiovascular        

           

auscultation of heart                   Overall:                   normal heart 

sounds                    2014                   None                

 

                    Full Exam - General                   Cardiovascular        

           

auscultation of heart                   S3 (ventricular gallop):                

                    present                   2014                   None 

               

 

                    Full Exam - General                   Cardiovascular        

           

auscultation of heart                   Murmur:                   previously 

known murmur unchanged                   2014                   None      

         

 

                    Full Exam - General                   Constitutional        

            general 

appearance                   Nourishment:                   obese               

                          2014                   None                

 

                    Full Exam - General                   Constitutional        

            general 

appearance                   Overall:                   well developed          

                          2014                   None                

 

                    Full Exam - General                   Constitutional        

            general 

appearance                   Overall:                   in no acute distress    

                          2014                   None                

 

                    Full Exam - General                   Ears/Nose/Throat      

             

external ear                   Overall:                   normal appearance     

                          2014                   None                

 

                    Full Exam - General                   Ears/Nose/Throat      

             

otoscopic exam                   Overall:                   tympanic membranes 

clear                     2014                   None                

 

                    Full Exam - General                   Ears/Nose/Throat      

             

otoscopic exam                   Overall:                   external auditory 

canals clear                   2014                   None                

 

                    Full Exam - General                   Ears/Nose/Throat      

             

otoscopic exam                          Right external auditory canal:          

       

                    partial cerumen occlusion                   2014      

             None  

             

 

                    Full Exam - General                   Ears/Nose/Throat      

             

otoscopic exam                   Right tympanic membrane:                   not 

visualized                   2014                   None                

 

                    Full Exam - General                   Ears/Nose/Throat      

             

otoscopic exam                   Left tympanic membrane:                   a 

normal exam                   2014                   None                

 

                    Full Exam - General                   Respiratory           

        auscultation

                          Overall:                   breath sounds clear bilater

ally    

                          2014                   None                

 

                    Full Exam - General                   Cardiovascular        

           

auscultation of heart                   Overall:                   normal heart 

sounds                    2014                   None                

 

                    Full Exam - General                   Cardiovascular        

           

auscultation of heart                   Overall:                   regular rate 

                          2014                   None                

 

                    Full Exam - General                   Psychiatric           

        

orientation/consciousness                   Overall:                   oriented 

to person, place and time                   2014                   None   

            

 

                    Full Exam - General                   Respiratory           

        auscultation

                          Overall:                   breath sounds clear bilater

ally    

                          10/15/2013                   None                

 

                    Full Exam - General                   Constitutional        

            general 

appearance                   Overall:                   well developed          

                          10/15/2013                   None                

 

                    Full Exam - General                   Constitutional        

            general 

appearance                   Overall:                   in no acute distress    

                          10/15/2013                   None                

 

                    Full Exam - General                   Constitutional        

            general 

appearance                   Nourishment:                   obese               

                          10/15/2013                   None                

 

                    Full Exam - General                   Ears/Nose/Throat      

             

otoscopic exam                   Left tympanic membrane:                   not 

visualized                   10/15/2013                   due to cerumen        

       

 

                    Full Exam - General                   Ears/Nose/Throat      

             

otoscopic exam                   Right tympanic membrane:                   

abnormal light reflex                   10/15/2013                   None       

        

 

                    Full Exam - General                   Cardiovascular        

           

auscultation of heart                   Overall:                   regular rate 

                          10/15/2013                   None                

 

                    Full Exam - General                   Cardiovascular        

           

auscultation of heart                   Overall:                   normal heart 

sounds                    10/15/2013                   None                

 

                    Full Exam - General                   Psychiatric           

        

orientation/consciousness                   Overall:                   oriented 

to person, place and time                   10/15/2013                   None   

            

 

                    Full Exam - General                   Constitutional        

            general 

appearance                   Overall:                   well nourished          

                          2013                   None                

 

                    Full Exam - General                   Constitutional        

            general 

appearance                   Overall:                   well developed          

                          2013                   None                

 

                    Full Exam - General                   Constitutional        

            general 

appearance                   Overall:                   in no acute distress    

                          2013                   None                

 

                    Full Exam - General                   Neurologic            

       mental status

                    Overall:                   alert                   

3 

                                        None                

 

                    Full Exam - General                   Neurologic            

       mental status

                    Overall:                   oriented                   

2013                              None                

 

                    Full Exam - General                   Psychiatric           

        mood and 

affect                    Overall:                   normal mood and affect     

 

                          2013                   None                

 

                    Full Exam - General                   Respiratory           

        auscultation

                          Overall:                   breath sounds clear bilater

ally    

                          2013                   None                

 

                    Full Exam - General                   Cardiovascular        

           

auscultation of heart                   Overall:                   regular rate 

                          2013                   None                

 

                    Full Exam - General                   Cardiovascular        

           

auscultation of heart                   Overall:                   normal heart 

sounds                    2013                   None                

 

                    Full Exam - General                   Cardiovascular        

           

auscultation of heart                   S3 (ventricular gallop):                

                    present                   2013                   None 

               

 

                    Full Exam - General                   Cardiovascular        

           

extremities                   Overall:                   no clubbing            

                          2013                   None                

 

                    Full Exam - General                   Cardiovascular        

           

extremities                   Overall:                   No cyanosis            

                          2013                   None                

 

                    Full Exam - General                   Cardiovascular        

           

auscultation of heart                   Murmur:                   previously 

known murmur unchanged                   2013                   None      

         

 

                    Full Exam - General                   Constitutional        

            general 

appearance                   Overall:                   well nourished          

                          2013                   None                

 

                    Full Exam - General                   Constitutional        

            general 

appearance                   Overall:                   well developed          

                          2013                   None                

 

                    Full Exam - General                   Constitutional        

            general 

appearance                   Overall:                   in no acute distress    

                          2013                   None                

 

                    Full Exam - General                   Neurologic            

       mental status

                    Overall:                   alert                   

3 

                                        None                

 

                    Full Exam - General                   Neurologic            

       mental status

                    Overall:                   oriented                   

2013                              None                

 

                    Full Exam - General                   Psychiatric           

        mood and 

affect                    Overall:                   normal mood and affect     

 

                          2013                   None                

 

                    Full Exam - General                   Ears/Nose/Throat      

             

otoscopic exam                   Overall:                   external auditory 

canals clear                   2013                   None                

 

                    Full Exam - General                   Ears/Nose/Throat      

             

otoscopic exam                   Left tympanic membrane:                   air-

fluid level                   2013                   None                

 

                    Full Exam - General                   Ears/Nose/Throat      

             

otoscopic exam                   Right tympanic membrane:                   air-

fluid level                   2013                   None                

 

                    Full Exam - General                   Ears/Nose/Throat      

             

internal nose                   Turbinates:                   hypertrophy       

                          2013                   None                

 

                    Full Exam - General                   Ears/Nose/Throat      

             

internal nose                   Turbinates:                   erythema          

                          2013                   None                

 

                    Full Exam - General                   Ears/Nose/Throat      

             oral 

cavity/pharynx/larynx                    Oropharynx:                   postnasal

drainage                   2013                   None                

 

                    Full Exam - General                   Respiratory           

        auscultation

                          Overall:                   breath sounds clear bilater

ally    

                          2013                   None                

 

                    Full Exam - General                   Cardiovascular        

           

auscultation of heart                   Overall:                   regular rate 

                          2013                   None                

 

                    Full Exam - General                   Cardiovascular        

           

auscultation of heart                   Overall:                   normal heart 

sounds                    2013                   None                

 

                    Full Exam - General                   Cardiovascular        

           

auscultation of heart                   S3 (ventricular gallop):                

                    present                   2013                   None 

               

 

                    Full Exam - General                   Cardiovascular        

           

auscultation of heart                   Murmur:                   previously 

known murmur unchanged                   2013                   None      

         

 

                    Full Exam - General                   Constitutional        

            general 

appearance                   Overall:                   well nourished          

                          2013                   None                

 

                    Full Exam - General                   Constitutional        

            general 

appearance                   Overall:                   well developed          

                          2013                   None                

 

                    Full Exam - General                   Constitutional        

            general 

appearance                   Overall:                   in no acute distress    

                          2013                   None                

 

                    Full Exam - General                   Neurologic            

       mental status

                    Overall:                   alert                   

3 

                                        None                

 

                    Full Exam - General                   Neurologic            

       mental status

                    Overall:                   oriented                   

2013                              None                

 

                    Full Exam - General                   Psychiatric           

        mood and 

affect                    Overall:                   normal mood and affect     

 

                          2013                   None                

 

                    Full Exam - General                   Ears/Nose/Throat      

             

otoscopic exam                   Overall:                   external auditory 

canals clear                   2013                   None                

 

                    Full Exam - General                   Ears/Nose/Throat      

             

otoscopic exam                   Overall:                   tympanic membranes 

clear                     2013                   None                

 

                    Full Exam - General                   Ears/Nose/Throat      

             

internal nose                   Overall:                   bilateral nasal 

cavities clear                   2013                   None              

 

 

                    Full Exam - General                   Ears/Nose/Throat      

             oral 

cavity/pharynx/larynx                    Overall:                   oral mucosa 

clear                     2013                   None                

 

                    Full Exam - General                   Constitutional        

            general 

appearance                   Overall:                   well nourished          

                          05/10/2013                   None                

 

                    Full Exam - General                   Constitutional        

            general 

appearance                   Overall:                   well developed          

                          05/10/2013                   None                

 

                    Full Exam - General                   Constitutional        

            general 

appearance                   Overall:                   in no acute distress    

                          05/10/2013                   None                

 

                    Full Exam - General                   Ears/Nose/Throat      

             

otoscopic exam                   Overall:                   external auditory 

canals clear                   05/10/2013                   no tragal or pinna 

pain with movement                

 

                    Full Exam - General                   Ears/Nose/Throat      

             

otoscopic exam                   Overall:                   tympanic membranes 

clear                     05/10/2013                   right TM has perforation 

(appears old?) at 6:00 position                

 

                    Full Exam - General                   Ears/Nose/Throat      

             

otoscopic exam                   Perforation:                   right           

                          05/10/2013                   None                

 

                    Full Exam - General                   Ears/Nose/Throat      

             

internal nose                   Overall:                   bilateral nasal 

cavities clear                   05/10/2013                   None              

 

 

                    Full Exam - General                   Ears/Nose/Throat      

             

internal nose                   Overall:                   turbinates benign    

                          05/10/2013                   None                

 

                    Full Exam - General                   Ears/Nose/Throat      

             

internal nose                   Overall:                   no drainage          

                          05/10/2013                   None                

 

                    Full Exam - General                   Ears/Nose/Throat      

             

internal nose                   Sinus tenderness:                   left 

maxillary                   05/10/2013                   marked                

 

                    Full Exam - General                   Ears/Nose/Throat      

             

internal nose                   Sinus tenderness:                   right 

maxillary                   05/10/2013                   marked                

 

                    Full Exam - General                   Ears/Nose/Throat      

             

internal nose                   Sinus tenderness:                   right 

frontal                   05/10/2013                   None                

 

                    Full Exam - General                   Ears/Nose/Throat      

             

internal nose                   Sinus tenderness:                   left frontal

                          05/10/2013                   None                

 

                    Full Exam - General                   Ears/Nose/Throat      

             oral 

cavity/pharynx/larynx                    Overall:                   

oropharyngeal mucosa clear                   05/10/2013                   None  

             

 

                    Full Exam - General                   Respiratory           

        auscultation

                          Overall:                   breath sounds clear bilater

ally    

                          05/10/2013                   None                

 

                    Full Exam - General                   Cardiovascular        

           

auscultation of heart                   Overall:                   regular rate 

                          05/10/2013                   None                

 

                    Full Exam - General                   Cardiovascular        

           

auscultation of heart                   Overall:                   normal heart 

sounds                    05/10/2013                   None                

 

                    Full Exam - General                   Cardiovascular        

           

auscultation of heart                   Overall:                   no murmurs   

                          05/10/2013                   None                

 

                    Full Exam - General                   Constitutional        

            general 

appearance                   Overall:                   well nourished          

                          2013                   None                

 

                    Full Exam - General                   Constitutional        

            general 

appearance                   Overall:                   well developed          

                          2013                   None                

 

                    Full Exam - General                   Constitutional        

            general 

appearance                   Overall:                   in no acute distress    

                          2013                   None                

 

                    Full Exam - General                   Neurologic            

       mental status

                    Overall:                   alert                   

3 

                                        None                

 

                    Full Exam - General                   Neurologic            

       mental status

                    Overall:                   oriented                   

2013                              None                

 

                    Full Exam - General                   Psychiatric           

        mood and 

affect                    Overall:                   normal mood and affect     

 

                          2013                   None                

 

                    Full Exam - General                   Chest/Breast          

         breast and 

axillae palpation                       Left lower inner quadrant:              

    

                    tender                   2013                   None  

              

 

                    Full Exam - General                   Chest/Breast          

         breast and 

axillae palpation                       Left lower inner quadrant:              

    

                    no mass                   2013                   None 

               

 

                    Full Exam - General                   Chest/Breast          

         breast and 

axillae palpation                       Left upper outer quadrant:              

    

                    no mass                   2013                   None 

               

 

                    Full Exam - General                   Chest/Breast          

         breast and 

axillae palpation                       Left upper inner quadrant:              

    

                    no mass                   2013                   None 

               

 

                    Full Exam - General                   Chest/Breast          

         breast and 

axillae palpation                       Left lower outer quadrant:              

    

                    no mass                   2013                   None 

               

 

                    Full Exam - General                   Chest/Breast          

         breast and 

axillae palpation                       Right lower inner quadrant:             

    

                    tender                   2013                   None  

              

 

                    Full Exam - General                   Chest/Breast          

         breast and 

axillae palpation                       Right upper inner quadrant:             

    

                    no mass                   2013                   None 

               

 

                    Full Exam - General                   Chest/Breast          

         breast and 

axillae palpation                       Right upper outer quadrant:             

    

                    no mass                   2013                   None 

               

 

                    Full Exam - General                   Chest/Breast          

         breast and 

axillae palpation                       Right lower outer quadrant:             

    

                    no mass                   2013                   None 

               

 

                    Full Exam - General                   Constitutional        

            general 

appearance                   Overall:                   well nourished          

                          2012                   None                

 

                    Full Exam - General                   Constitutional        

            general 

appearance                   Overall:                   well developed          

                          2012                   None                

 

                    Full Exam - General                   Constitutional        

            general 

appearance                   Overall:                   in no acute distress    

                          2012                   None                

 

                    Full Exam - General                   Neurologic            

       mental status

                    Overall:                   alert                   

2 

                                        None                

 

                    Full Exam - General                   Neurologic            

       mental status

                    Overall:                   oriented                   

2012                              None                

 

                    Full Exam - General                   Psychiatric           

        mood and 

affect                    Overall:                   normal mood and affect     

 

                          2012                   None                

 

                    Full Exam - General                   Respiratory           

        auscultation

                          Overall:                   breath sounds clear bilater

ally    

                          2012                   None                

 

                    Full Exam - General                   Cardiovascular        

           

auscultation of heart                   Overall:                   regular rate 

                          2012                   None                

 

                    Full Exam - General                   Cardiovascular        

           

auscultation of heart                   Overall:                   normal heart 

sounds                    2012                   None                

 

                    Full Exam - General                   Cardiovascular        

           

auscultation of heart                   S3 (ventricular gallop):                

                    present                   2012                   None 

               

 

                    Full Exam - General                   Cardiovascular        

           

auscultation of heart                   Murmur:                   previously 

known murmur unchanged                   2012                   None      

         

 

                    Full Exam - General                   Ears/Nose/Throat      

             

otoscopic exam                          Left external auditory canal:           

       

                    complete cerumen impaction                   2012     

              None  

             

 

                    Full Exam - General                   Ears/Nose/Throat      

             

otoscopic exam                          Right external auditory canal:          

       

                    partial cerumen occlusion                   2012      

             None  

             

 

                    Full Exam - General                   Ears/Nose/Throat      

             

otoscopic exam                   Left tympanic membrane:                   

abnormal light reflex                   2012                   None       

        

 

                    Full Exam - General                   Ears/Nose/Throat      

             

otoscopic exam                   Right tympanic membrane:                   

abnormal light reflex                   2012                   None       

        

 

                    Full Exam - General                   Ears/Nose/Throat      

             

internal nose                   Turbinates:                   hypertrophy       

                          2012                   None                

 

                    Full Exam - General                   Ears/Nose/Throat      

             oral 

cavity/pharynx/larynx                    Overall:                   oral mucosa 

clear                     2012                   None                

 

                    Full Exam - General                   Abdomen               

    abdominal exam  

                    Overall:                   no masses                   

2012                              None                

 

                    Full Exam - General                   Abdomen               

    abdominal exam  

                          Overall:                   normal bowel sounds        

          

                          2012                   None                

 

                    Full Exam - General                   Abdomen               

    abdominal exam  

                    Overall:                   soft                   2012

    

                                        None                

 

                    Full Exam - General                   Abdomen               

    abdominal exam  

                          Right upper quadrant:                   tender to palp

ation     

                          2012                   None                

 

                    Full Exam - General                   Abdomen               

    abdominal exam  

                          Epigastric:                   tender to palpation     

          

                          2012                   None                

 

                    Full Exam - General                   Constitutional        

            general 

appearance                   Overall:                   well nourished          

                          10/23/2012                   None                

 

                    Full Exam - General                   Constitutional        

            general 

appearance                   Overall:                   well developed          

                          10/23/2012                   None                

 

                    Full Exam - General                   Constitutional        

            general 

appearance                   Overall:                   in no acute distress    

                          10/23/2012                   None                

 

                    Full Exam - General                   Neurologic            

       mental status

                    Overall:                   alert                   10/23/201

2 

                                        None                

 

                    Full Exam - General                   Neurologic            

       mental status

                    Overall:                   oriented                   

10/23/2012                              None                

 

                    Full Exam - General                   Psychiatric           

        mood and 

affect                    Overall:                   normal mood and affect     

 

                          10/23/2012                   None                

 

                    Full Exam - General                   Abdomen               

    abdominal exam  

                    Overall:                   no masses                   

10/23/2012                              None                

 

                    Full Exam - General                   Abdomen               

    abdominal exam  

                          Overall:                   normal bowel sounds        

          

                          10/23/2012                   None                

 

                    Full Exam - General                   Abdomen               

    abdominal exam  

                    Overall:                   soft                   10/23/2012

    

                                        None                

 

                    Full Exam - General                   Abdomen               

    abdominal exam  

                          Epigastric:                   tender to palpation     

          

                          10/23/2012                   None                

 

                    Full Exam - General                   Abdomen               

    abdominal exam  

                          Left upper quadrant:                   tender to palpa

tion      

                          10/23/2012                   None                

 

                    Full Exam - General                   Abdomen               

    abdominal exam  

                          Left lower quadrant:                   tender to palpa

tion      

                          10/23/2012                   None                

 

                    Full Exam - General                   Abdomen               

    abdominal exam  

                          Right upper quadrant:                   non-tender to 

palpation 

                          10/23/2012                   None                

 

                    Full Exam - General                   Abdomen               

    abdominal exam  

                          Right lower quadrant:                   tender to palp

ation     

                          10/23/2012                   None                

 

                    Full Exam - General                   Constitutional        

            general 

appearance                   Overall:                   well nourished          

                          2012                   None                

 

                    Full Exam - General                   Constitutional        

            general 

appearance                   Overall:                   well developed          

                          2012                   None                

 

                    Full Exam - General                   Constitutional        

            general 

appearance                   Evidence of Distress:                   anxious    

                          2012                   None                

 

                    Full Exam - General                   Respiratory           

        auscultation

                          Overall:                   breath sounds clear bilater

ally    

                          2012                   None                

 

                    Full Exam - General                   Cardiovascular        

           

auscultation of heart                   Overall:                   regular rate 

                          2012                   None                

 

                    Full Exam - General                   Cardiovascular        

           

auscultation of heart                   Overall:                   normal heart 

sounds                    2012                   None                

 

                    Full Exam - General                   Cardiovascular        

           

auscultation of heart                   Murmur:                   previously 

known murmur unchanged                   2012                   None      

         

 

                    Full Exam - General                   Cardiovascular        

           

auscultation of heart                   S3 (ventricular gallop):                

                    present                   2012                   None 

               

 

                    Full Exam - General                   Neurologic            

       mental status

                    Overall:                   alert                   

2 

                                        None                

 

                    Full Exam - General                   Neurologic            

       mental status

                    Overall:                   oriented                   

2012                              None                

 

                    Full Exam - General                   Psychiatric           

        mood and 

affect                    Overall:                   normal mood and affect     

 

                          2012                   None                

 

                    Full Exam - General                   Constitutional        

            general 

appearance                   Overall:                   well nourished          

                          2012                   None                

 

                    Full Exam - General                   Constitutional        

            general 

appearance                   Overall:                   well developed          

                          2012                   None                

 

                    Full Exam - General                   Constitutional        

            general 

appearance                   Overall:                   in no acute distress    

                          2012                   None                

 

                    Full Exam - General                   Neurologic            

       mental status

                    Overall:                   alert                   

2 

                                        None                

 

                    Full Exam - General                   Neurologic            

       mental status

                    Overall:                   oriented                   

2012                              None                

 

                    Full Exam - General                   Psychiatric           

        mood and 

affect                    Overall:                   normal mood and affect     

 

                          2012                   None                

 

                    Full Exam - General                   Respiratory           

        auscultation

                          Overall:                   breath sounds clear bilater

ally    

                          2012                   None                

 

                    Full Exam - General                   Cardiovascular        

           

auscultation of heart                   Overall:                   regular rate 

                          2012                   None                

 

                    Full Exam - General                   Cardiovascular        

           

auscultation of heart                   Overall:                   normal heart 

sounds                    2012                   None                

 

                    Full Exam - General                   Cardiovascular        

           

auscultation of heart                   S3 (ventricular gallop):                

                    present                   2012                   None 

               

 

                    Full Exam - General                   Cardiovascular        

           

auscultation of heart                   Murmur:                   previously 

known murmur unchanged                   2012                   None      

         

 

                    Full Exam - General                   Cardiovascular        

           

extremities                   Overall:                   no clubbing            

                          2012                   None                

 

                    Full Exam - General                   Cardiovascular        

           

extremities                   Overall:                   No edema               

                          2012                   None                

 

                    Full Exam - General                   Cardiovascular        

           

extremities                   Overall:                   No cyanosis            

                          2012                   None                

 

                    Full Exam - General                   Constitutional        

            general 

appearance                   Overall:                   well nourished          

                          2012                   None                

 

                    Full Exam - General                   Constitutional        

            general 

appearance                   Overall:                   well developed          

                          2012                   None                

 

                    Full Exam - General                   Constitutional        

            general 

appearance                   Overall:                   in no acute distress    

                          2012                   None                

 

                    Full Exam - General                   Neurologic            

       mental status

                    Overall:                   alert                   

2 

                                        None                

 

                    Full Exam - General                   Neurologic            

       mental status

                    Overall:                   oriented                   

2012                              None                

 

                    Full Exam - General                   Psychiatric           

        mood and 

affect                    Overall:                   normal mood and affect     

 

                          2012                   None                

 

                    Full Exam - General                   Musculoskeletal       

            spine, 

ribs and pelvis                   Spine:                    tender @ lumbar spin

e

                          2012                   None                

 

                    Full Exam - General                   Constitutional        

            general 

appearance                   Overall:                   well nourished          

                          2012                   None                

 

                    Full Exam - General                   Constitutional        

            general 

appearance                   Overall:                   well developed          

                          2012                   None                

 

                    Full Exam - General                   Constitutional        

            general 

appearance                   Overall:                   in no acute distress    

                          2012                   None                

 

                    Full Exam - General                   Neurologic            

       mental status

                    Overall:                   alert                   

2 

                                        None                

 

                    Full Exam - General                   Psychiatric           

        mood and 

affect                    Overall:                   normal mood and affect     

 

                          2012                   None                

 

                    Full Exam - General                   Musculoskeletal       

            spine, 

ribs and pelvis                   Sacroiliac joints:                   tender 

right sacroiliac joint                   2012                   None      

         

 

                    Full Exam - General                   Musculoskeletal       

            spine, 

ribs and pelvis                   Spine:                    tender @ thoracic 

spine                     2012                   None                

 

                    Full Exam - General                   Musculoskeletal       

            spine, 

ribs and pelvis                   Spine:                    tender @ lumbar spin

e

                          2012                   None                

 

                    Full Exam - General                   Constitutional        

            general 

appearance                   Overall:                   well nourished          

                          2012                   None                

 

                    Full Exam - General                   Constitutional        

            general 

appearance                   Overall:                   well developed          

                          2012                   None                

 

                    Full Exam - General                   Constitutional        

            general 

appearance                   Overall:                   in no acute distress    

                          2012                   None                

 

                    Full Exam - General                   Musculoskeletal       

            spine, 

ribs and pelvis                   Spine:                    tender @ thoracic 

spine                     2012                   None                

 

                    Full Exam - General                   Neurologic            

       mental status

                    Overall:                   alert                   

2 

                                        None                

 

                    Full Exam - General                   Neurologic            

       mental status

                    Overall:                   oriented                   

2012                              None                

 

                    Full Exam - General                   Psychiatric           

        mood and 

affect                    Overall:                   normal mood and affect     

 

                          2012                   None                

 

                    Full Exam - General                   Constitutional        

            general 

appearance                   Overall:                   well nourished          

                          2012                   None                

 

                    Full Exam - General                   Constitutional        

            general 

appearance                   Overall:                   well developed          

                          2012                   None                

 

                    Full Exam - General                   Constitutional        

            general 

appearance                   Overall:                   in no acute distress    

                          2012                   None                

 

                    Full Exam - General                   Neurologic            

       mental status

                    Overall:                   alert                   

2 

                                        None                

 

                    Full Exam - General                   Neurologic            

       mental status

                    Overall:                   oriented                   

2012                              None                

 

                    Full Exam - General                   Psychiatric           

        mood and 

affect                    Overall:                   normal mood and affect     

 

                          2012                   None                

 

                    Full Exam - General                   Musculoskeletal       

            spine, 

ribs and pelvis                   Spine:                    tender @ thoracic 

spine                     2012                   None                

 

                    Full Exam - General                   Musculoskeletal       

            spine, 

ribs and pelvis                   Spine:                    tender @ lumbar spin

e

                          2012                   None                

 

                    Full Exam - General                   Musculoskeletal       

            gait and

station                   Overall:                   normal station             

                          2012                   None                

 

                    Full Exam - General                   Musculoskeletal       

            gait and

station                   Gait:                   antalgic                   

2012                              None                

 

                    Full Exam - General                   Musculoskeletal       

            spine, 

ribs and pelvis                   Sacroiliac joints:                   tender 

right sacroiliac joint                   2012                   None      

         

 

                    Full Exam - General                   Musculoskeletal       

            spine, 

ribs and pelvis                   Sacroiliac joints:                   tender 

left sacroiliac joint                   2012                   None       

        

 

                    Full Exam - General                   Constitutional        

            general 

appearance                   Overall:                   in no acute distress    

                          2012                   None                

 

                    Full Exam - General                   Constitutional        

            general 

appearance                   Overall:                   well developed          

                          2012                   None                

 

                    Full Exam - General                   Constitutional        

            general 

appearance                   Overall:                   well nourished          

                          2012                   None                

 

                    Full Exam - General                   Neurologic            

       mental status

                    Overall:                   alert                   

2 

                                        None                

 

                    Full Exam - General                   Neurologic            

       mental status

                    Overall:                   oriented                   

2012                              None                

 

                    Full Exam - General                   Psychiatric           

        mood and 

affect                    Overall:                   normal mood and affect     

 

                          2012                   None                

 

                    Full Exam - General                   Respiratory           

        auscultation

                          Right upper lung field:                   a normal exa

m       

                          2012                   None                

 

                    Full Exam - General                   Respiratory           

        auscultation

                          Right middle lung field:                   a normal ex

am      

                          2012                   None                

 

                    Full Exam - General                   Respiratory           

        auscultation

                          Right lower lung field:                   a normal exa

m       

                          2012                   None                

 

                    Full Exam - General                   Respiratory           

        auscultation

                          Left lower lung field:                   a normal exam

        

                          2012                   None                

 

                    Full Exam - General                   Respiratory           

        auscultation

                          Overall:                   breath sounds clear bilater

ally    

                          2012                   None                

 

                    Full Exam - General                   Respiratory           

        auscultation

                          Left upper lung field:                   a normal exam

        

                          2012                   None                

 

                    Full Exam - General                   Respiratory           

        auscultation

                    Diffuse:                   a normal exam                   

2012                              None                

 

                    Full Exam - General                   Cardiovascular        

           

auscultation of heart                   Overall:                   no murmurs   

                          2012                   None                

 

                    Full Exam - General                   Cardiovascular        

           

auscultation of heart                   Overall:                   regular rate 

                          2012                   None                

 

                    Full Exam - General                   Cardiovascular        

           

auscultation of heart                   Overall:                   normal heart 

sounds                    2012                   None                

 

                    Full Exam - General                   Cardiovascular        

           

auscultation of heart                   S1:                       a normal exam 

    

                          2012                   None                

 

                    Full Exam - General                   Cardiovascular        

           

auscultation of heart                   S2:                       a normal exam 

    

                          2012                   None                

 

                    Full Exam - General                   Cardiovascular        

           

auscultation of heart                   Rhythm:                   regular rhythm

                          2012                   None                

 

                    Full Exam - General                   Cardiovascular        

           

auscultation of heart                   Rate:                     regular rate  

  

                          2012                   None                

 

                    Full Exam - General                   Cardiovascular        

           

auscultation of heart                   S3 (ventricular gallop):                

                    present                   2012                   None 

               

 

                    Full Exam - General                   Cardiovascular        

           

auscultation of heart                   Murmur:                   previously 

known murmur unchanged                   2012                   None      

         

 

                    Full Exam - General                   Cardiovascular        

           

extremities                   Overall:                   no clubbing            

                          2012                   None                

 

                    Full Exam - General                   Cardiovascular        

           

extremities                   Overall:                   No edema               

                          2012                   None                

 

                    Full Exam - General                   Cardiovascular        

           

extremities                   Overall:                   No cyanosis            

                          2012                   None                

 

                    Full Exam - General                   Constitutional        

            general 

appearance                   Overall:                   in no acute distress    

                          2011                   None                

 

                    Full Exam - General                   Constitutional        

            general 

appearance                   Overall:                   well developed          

                          2011                   None                

 

                    Full Exam - General                   Constitutional        

            general 

appearance                   Overall:                   well nourished          

                          2011                   None                

 

                    Full Exam - General                   Neurologic            

       mental status

                    Overall:                   alert                   

1 

                                        None                

 

                    Full Exam - General                   Neurologic            

       mental status

                    Overall:                   oriented                   

2011                              None                

 

                    Full Exam - General                   Psychiatric           

        mood and 

affect                    Overall:                   normal mood and affect     

 

                          2011                   None                

 

                    Full Exam - General                   Cardiovascular        

           

auscultation of heart                   Overall:                   no murmurs   

                          2011                   None                

 

                    Full Exam - General                   Cardiovascular        

           

auscultation of heart                   Overall:                   regular rate 

                          2011                   None                

 

                    Full Exam - General                   Cardiovascular        

           

auscultation of heart                   Overall:                   normal heart 

sounds                    2011                   None                

 

                    Full Exam - General                   Cardiovascular        

           

auscultation of heart                   S1:                       a normal exam 

    

                          2011                   None                

 

                    Full Exam - General                   Cardiovascular        

           

auscultation of heart                   S2:                       a normal exam 

    

                          2011                   None                

 

                    Full Exam - General                   Cardiovascular        

           

auscultation of heart                   Rhythm:                   regular rhythm

                          2011                   None                

 

                    Full Exam - General                   Cardiovascular        

           

auscultation of heart                   Rate:                     regular rate  

  

                          2011                   None                

 

                    Full Exam - General                   Cardiovascular        

           

auscultation of heart                   S3 (ventricular gallop):                

                    present                   2011                   None 

               

 

                    Full Exam - General                   Respiratory           

        auscultation

                          Right upper lung field:                   a normal exa

m       

                          2011                   None                

 

                    Full Exam - General                   Respiratory           

        auscultation

                          Right middle lung field:                   a normal ex

am      

                          2011                   None                

 

                    Full Exam - General                   Respiratory           

        auscultation

                          Right lower lung field:                   a normal exa

m       

                          2011                   None                

 

                    Full Exam - General                   Respiratory           

        auscultation

                          Left lower lung field:                   a normal exam

        

                          2011                   None                

 

                    Full Exam - General                   Respiratory           

        auscultation

                          Overall:                   breath sounds clear bilater

ally    

                          2011                   None                

 

                    Full Exam - General                   Respiratory           

        auscultation

                          Left upper lung field:                   a normal exam

        

                          2011                   None                

 

                    Full Exam - General                   Respiratory           

        auscultation

                    Diffuse:                   a normal exam                   

2011                              None                

 

                    Full Exam - General                   Ears/Nose/Throat      

             

otoscopic exam                   Overall:                   tympanic membranes 

clear                     2011                   None                

 

                    Full Exam - General                   Ears/Nose/Throat      

             

internal nose                   Turbinates:                   hypertrophy       

                          2011                   None                

 

                    Full Exam - General                   Ears/Nose/Throat      

             

internal nose                   Turbinates:                   erythema          

                          2011                   None                

 

                    Full Exam - General                   Ears/Nose/Throat      

             oral 

cavity/pharynx/larynx                    Overall:                   oral mucosa 

clear                     2011                   None                

 

                    Full Exam - General                   Neck                  

 inspection of neck 

                    Overall:                   normal size                   

2011                              None                

 

                    Full Exam - General                   Neck                  

 inspection of neck 

                    Overall:                   no masses                   

2011                              None                

 

                    Full Exam - General                   Ears/Nose/Throat      

             

otoscopic exam                          Left external auditory canal:           

       

                    complete cerumen impaction                   2011     

              None  

             

 

                    Full Exam - General                   Ears/Nose/Throat      

             

otoscopic exam                          Right external auditory canal:          

       

                    complete cerumen impaction                   2011     

              None 

              

 

                    Full Exam - General                   Constitutional        

            general 

appearance                   Overall:                   in no acute distress    

                          2011                   None                

 

                    Full Exam - General                   Constitutional        

            general 

appearance                   Overall:                   well developed          

                          2011                   None                

 

                    Full Exam - General                   Constitutional        

            general 

appearance                   Overall:                   well nourished          

                          2011                   None                

 

                    Full Exam - General                   Neurologic            

       mental status

                    Overall:                   alert                   

1 

                                        None                

 

                    Full Exam - General                   Neurologic            

       mental status

                    Overall:                   oriented                   

2011                              None                

 

                    Full Exam - General                   Psychiatric           

        mood and 

affect                    Overall:                   normal mood and affect     

 

                          2011                   None                

 

                    Full Exam - General                   Respiratory           

        auscultation

                          Right upper lung field:                   a normal exa

m       

                          2011                   None                

 

                    Full Exam - General                   Respiratory           

        auscultation

                          Right middle lung field:                   a normal ex

am      

                          2011                   None                

 

                    Full Exam - General                   Respiratory           

        auscultation

                          Right lower lung field:                   a normal exa

m       

                          2011                   None                

 

                    Full Exam - General                   Respiratory           

        auscultation

                          Left lower lung field:                   a normal exam

        

                          2011                   None                

 

                    Full Exam - General                   Respiratory           

        auscultation

                          Overall:                   breath sounds clear bilater

ally    

                          2011                   None                

 

                    Full Exam - General                   Respiratory           

        auscultation

                          Left upper lung field:                   a normal exam

        

                          2011                   None                

 

                    Full Exam - General                   Respiratory           

        auscultation

                    Diffuse:                   a normal exam                   

2011                              None                

 

                    Full Exam - General                   Cardiovascular        

           

auscultation of heart                   Overall:                   no murmurs   

                          2011                   None                

 

                    Full Exam - General                   Cardiovascular        

           

auscultation of heart                   Overall:                   regular rate 

                          2011                   None                

 

                    Full Exam - General                   Cardiovascular        

           

auscultation of heart                   Overall:                   normal heart 

sounds                    2011                   None                

 

                    Full Exam - General                   Cardiovascular        

           

auscultation of heart                   S1:                       a normal exam 

    

                          2011                   None                

 

                    Full Exam - General                   Cardiovascular        

           

auscultation of heart                   S2:                       a normal exam 

    

                          2011                   None                

 

                    Full Exam - General                   Cardiovascular        

           

auscultation of heart                   Rhythm:                   regular rhythm

                          2011                   None                

 

                    Full Exam - General                   Cardiovascular        

           

auscultation of heart                   Rate:                     regular rate  

  

                          2011                   None                

 

                    Full Exam - General                   Cardiovascular        

           

auscultation of heart                   S3 (ventricular gallop):                

                    present                   2011                   None 

               

 

                    Full Exam - General                   Abdomen               

    abdominal exam  

                    Overall:                   no masses                   

2011                              None                

 

                    Full Exam - General                   Abdomen               

    abdominal exam  

                          Overall:                   normal bowel sounds        

          

                          2011                   None                

 

                    Full Exam - General                   Abdomen               

    abdominal exam  

                    Overall:                   soft                   2011

    

                                        None                

 

                    Full Exam - General                   Abdomen               

    abdominal exam  

                          Epigastric:                   tender to palpation     

          

                          2011                   None                

 

                    Full Exam - General                   Constitutional        

            general 

appearance                   Overall:                   in no acute distress    

                          2011                   None                

 

                    Full Exam - General                   Constitutional        

            general 

appearance                   Overall:                   well developed          

                          2011                   None                

 

                    Full Exam - General                   Constitutional        

            general 

appearance                   Overall:                   well nourished          

                          2011                   None                

 

                    Full Exam - General                   Neurologic            

       mental status

                    Overall:                   alert                   

1 

                                        None                

 

                    Full Exam - General                   Neurologic            

       mental status

                    Overall:                   oriented                   

2011                              None                

 

                    Full Exam - General                   Psychiatric           

        mood and 

affect                    Overall:                   normal mood and affect     

 

                          2011                   None                

 

                    Full Exam - General                   Abdomen               

    abdominal exam  

                    Overall:                   no masses                   

2011                              None                

 

                    Full Exam - General                   Abdomen               

    abdominal exam  

                    Overall:                   no tenderness                   

2011                              None                

 

                    Full Exam - General                   Abdomen               

    abdominal exam  

                          Overall:                   normal bowel sounds        

          

                          2011                   None                

 

                    Full Exam - General                   Abdomen               

    abdominal exam  

                    Overall:                   soft                   2011

    

                                        None                

 

                    Full Exam - General                   Respiratory           

        auscultation

                          Right upper lung field:                   a normal exa

m       

                          2011                   None                

 

                    Full Exam - General                   Respiratory           

        auscultation

                          Right middle lung field:                   a normal ex

am      

                          2011                   None                

 

                    Full Exam - General                   Respiratory           

        auscultation

                          Right lower lung field:                   a normal exa

m       

                          2011                   None                

 

                    Full Exam - General                   Respiratory           

        auscultation

                          Left lower lung field:                   a normal exam

        

                          2011                   None                

 

                    Full Exam - General                   Respiratory           

        auscultation

                          Overall:                   breath sounds clear bilater

ally    

                          2011                   None                

 

                    Full Exam - General                   Respiratory           

        auscultation

                          Left upper lung field:                   a normal exam

        

                          2011                   None                

 

                    Full Exam - General                   Respiratory           

        auscultation

                    Diffuse:                   a normal exam                   

2011                              None                

 

                    Full Exam - General                   Cardiovascular        

           

auscultation of heart                   Overall:                   no murmurs   

                          2011                   None                

 

                    Full Exam - General                   Cardiovascular        

           

auscultation of heart                   Overall:                   regular rate 

                          2011                   None                

 

                    Full Exam - General                   Cardiovascular        

           

auscultation of heart                   Overall:                   normal heart 

sounds                    2011                   None                

 

                    Full Exam - General                   Cardiovascular        

           

auscultation of heart                   S1:                       a normal exam 

    

                          2011                   None                

 

                    Full Exam - General                   Cardiovascular        

           

auscultation of heart                   S2:                       a normal exam 

    

                          2011                   None                

 

                    Full Exam - General                   Cardiovascular        

           

auscultation of heart                   Rhythm:                   regular rhythm

                          2011                   None                

 

                    Full Exam - General                   Cardiovascular        

           

auscultation of heart                   Rate:                     regular rate  

  

                          2011                   None                

 

                    Full Exam - General                   Cardiovascular        

           

auscultation of heart                   S3 (ventricular gallop):                

                    present                   2011                   None 

               

 

                    Full Exam - General                   Cardiovascular        

           

auscultation of heart                   Murmur:                   previously 

known murmur unchanged                   2011                   None      

         

 

                    Full Exam - General                   Cardiovascular        

           

extremities                   Overall:                   no clubbing            

                          2011                   None                

 

                    Full Exam - General                   Cardiovascular        

           

extremities                   Overall:                   No edema               

                          2011                   None                

 

                    Full Exam - General                   Cardiovascular        

           

extremities                   Overall:                   No cyanosis            

                          2011                   None                

 

                    Full Exam - General                   Cardiovascular        

           

auscultation of heart                   Overall:                   no murmurs   

                          2011                   None                

 

                    Full Exam - General                   Cardiovascular        

           

auscultation of heart                   Rate:                     regular rate  

  

                          2011                   None                

 

                    Full Exam - General                   Cardiovascular        

           

auscultation of heart                   Rhythm:                   regular rhythm

                          2011                   None                

 

                    Full Exam - General                   Cardiovascular        

           

auscultation of heart                   S1:                       a normal exam 

    

                          2011                   None                

 

                    Full Exam - General                   Cardiovascular        

           

auscultation of heart                   S2:                       a normal exam 

    

                          2011                   None                

 

                    Full Exam - General                   Cardiovascular        

           

auscultation of heart                   S3 (ventricular gallop):                

                    present                   2011                   None 

               

 

                    Full Exam - General                   Ears/Nose/Throat      

             

otoscopic exam                   Overall:                   external auditory 

canals clear                   2011                   None                

 

                    Full Exam - General                   Ears/Nose/Throat      

             

internal nose                   Turbinates:                   hypertrophy       

                          2011                   None                

 

                    Full Exam - General                   Ears/Nose/Throat      

             

internal nose                   Turbinates:                   erythema          

                          2011                   None                

 

                    Full Exam - General                   Ears/Nose/Throat      

             oral 

cavity/pharynx/larynx                    Overall:                   oral mucosa 

clear                     2011                   None                

 

                    Full Exam - General                   Constitutional        

            general 

appearance                   Overall:                   well nourished          

                          2011                   None                

 

                    Full Exam - General                   Constitutional        

            general 

appearance                   Overall:                   well developed          

                          2011                   None                

 

                    Full Exam - General                   Constitutional        

            general 

appearance                   Overall:                   in no acute distress    

                          2011                   None                

 

                    Full Exam - General                   Neurologic            

       mental status

                    Overall:                   alert                   

1 

                                        None                

 

                    Full Exam - General                   Neurologic            

       mental status

                    Overall:                   oriented                   

2011                              None                

 

                    Full Exam - General                   Psychiatric           

        mood and 

affect                    Overall:                   normal mood and affect     

 

                          2011                   None                

 

                    Full Exam - General                   Respiratory           

        auscultation

                          Overall:                   breath sounds clear bilater

ally    

                          2011                   None                

 

                    Full Exam - General                   Respiratory           

        auscultation

                    Diffuse:                   a normal exam                   

2011                              None                

 

                    Full Exam - General                   Respiratory           

        auscultation

                          Left upper lung field:                   a normal exam

        

                          2011                   None                

 

                    Full Exam - General                   Respiratory           

        auscultation

                          Left lower lung field:                   a normal exam

        

                          2011                   None                

 

                    Full Exam - General                   Respiratory           

        auscultation

                          Right upper lung field:                   a normal exa

m       

                          2011                   None                

 

                    Full Exam - General                   Respiratory           

        auscultation

                          Right middle lung field:                   a normal ex

am      

                          2011                   None                

 

                    Full Exam - General                   Respiratory           

        auscultation

                          Right lower lung field:                   a normal exa

m       

                          2011                   None                

 

                    Full Exam - General                   Cardiovascular        

           

auscultation of heart                   Overall:                   regular rate 

                          2011                   None                

 

                    Full Exam - General                   Cardiovascular        

           

auscultation of heart                   Overall:                   normal heart 

sounds                    2011                   None                

 

                    Full Exam - General                   Constitutional        

            general 

appearance                   Overall:                   well nourished          

                          2011                   None                

 

                    Full Exam - General                   Constitutional        

            general 

appearance                   Overall:                   well developed          

                          2011                   None                

 

                    Full Exam - General                   Constitutional        

            general 

appearance                   Overall:                   in no acute distress    

                          2011                   None                

 

                    Full Exam - General                   Neurologic            

       mental status

                    Overall:                   alert                   

1 

                                        None                

 

                    Full Exam - General                   Neurologic            

       mental status

                    Overall:                   oriented                   

2011                              None                

 

                    Full Exam - General                   Psychiatric           

        mood and 

affect                    Overall:                   normal mood and affect     

 

                          2011                   None                

 

                    Full Exam - General                   Musculoskeletal       

            spine, 

ribs and pelvis                   Spine:                    tender @ lumbar spin

e

                          2011                   None                

 

                    Full Exam - General                   Musculoskeletal       

            spine, 

ribs and pelvis                   Sacroiliac joints:                   tender 

right sacroiliac joint                   2011                   None      

         

 

                    Full Exam - General                   Musculoskeletal       

            spine, 

ribs and pelvis                   Sacroiliac joints:                   tender 

right sacroiliac joint                   2011                   None      

         

 

                    Full Exam - General                   Musculoskeletal       

            spine, 

ribs and pelvis                   Sacroiliac joints:                   tender 

left sacroiliac joint                   2011                   None       

        

 

                    Full Exam - General                   Respiratory           

        auscultation

                          Overall:                   breath sounds clear bilater

ally    

                          2011                   None                

 

                    Full Exam - General                   Respiratory           

        auscultation

                    Diffuse:                   a normal exam                   

2011                              None                

 

                    Full Exam - General                   Respiratory           

        auscultation

                          Left upper lung field:                   a normal exam

        

                          2011                   None                

 

                    Full Exam - General                   Respiratory           

        auscultation

                          Left lower lung field:                   a normal exam

        

                          2011                   None                

 

                    Full Exam - General                   Respiratory           

        auscultation

                          Right upper lung field:                   a normal exa

m       

                          2011                   None                

 

                    Full Exam - General                   Respiratory           

        auscultation

                          Right middle lung field:                   a normal ex

am      

                          2011                   None                

 

                    Full Exam - General                   Respiratory           

        auscultation

                          Right lower lung field:                   a normal exa

m       

                          2011                   None                

 

                    Full Exam - General                   Cardiovascular        

           

auscultation of heart                   Overall:                   regular rate 

                          2011                   None                

 

                    Full Exam - General                   Cardiovascular        

           

auscultation of heart                   Overall:                   normal heart 

sounds                    2011                   None                

 

                    Full Exam - General                   Cardiovascular        

           

auscultation of heart                   Overall:                   no murmurs   

                          2011                   None                

 

                    Full Exam - General                   Cardiovascular        

           

auscultation of heart                   Rate:                     regular rate  

  

                          2011                   None                

 

                    Full Exam - General                   Cardiovascular        

           

auscultation of heart                   Rhythm:                   regular rhythm

                          2011                   None                

 

                    Full Exam - General                   Constitutional        

            general 

appearance                   Overall:                   well nourished          

                          2011                   None                

 

                    Full Exam - General                   Constitutional        

            general 

appearance                   Overall:                   well developed          

                          2011                   None                

 

                    Full Exam - General                   Constitutional        

            general 

appearance                   Overall:                   in no acute distress    

                          2011                   None                

 

                    Full Exam - General                   Neurologic            

       mental status

                    Overall:                   alert                   

1 

                                        None                

 

                    Full Exam - General                   Neurologic            

       mental status

                    Overall:                   oriented                   

2011                              None                

 

                    Full Exam - General                   Psychiatric           

        mood and 

affect                    Overall:                   normal mood and affect     

 

                          2011                   None                

 

                    Full Exam - General                   Musculoskeletal       

            spine, 

ribs and pelvis                   Spine:                    tender @ lumbar spin

e

                          2011                   None                

 

                    Full Exam - General                   Cardiovascular        

           

auscultation of heart                   S1:                       a normal exam 

    

                          2011                   None                

 

                    Full Exam - General                   Cardiovascular        

           

auscultation of heart                   S2:                       a normal exam 

    

                          2011                   None                

 

                    Full Exam - General                   Cardiovascular        

           

auscultation of heart                   S3 (ventricular gallop):                

                    present                   2011                   None 

               

 

                    Full Exam - General                   Eyes                  

 conjunctiva/eyelids

                    Overall:                   cornea clear                   

2010                              None                

 

                    Full Exam - General                   Eyes                  

 conjunctiva/eyelids

                    Overall:                   eyelids normal                   

2010                              None                

 

                    Full Exam - General                   Eyes                  

 pupils and irises  

                          Overall:                   pupils equal, round, reacti

ve to 

light and accomodation                   2010                   None      

         

 

                    Full Exam - General                   Ears/Nose/Throat      

             

external ear                   Overall:                   normal appearance     

                          2010                   None                

 

                    Full Exam - General                   Ears/Nose/Throat      

             

otoscopic exam                          Left external auditory canal:           

       

                    complete cerumen impaction                   2010     

              None  

             

 

                    Full Exam - General                   Ears/Nose/Throat      

             

otoscopic exam                          Right external auditory canal:          

       

                    complete cerumen impaction                   2010     

              None 

              

 

                    Full Exam - General                   Ears/Nose/Throat      

             

otoscopic exam                   Left tympanic membrane:                   not 

visualized                   2010                   None                

 

                    Full Exam - General                   Ears/Nose/Throat      

             

otoscopic exam                   Right tympanic membrane:                   not 

visualized                   2010                   None                

 

                    Full Exam - General                   Ears/Nose/Throat      

             

lips/teeth/gingiva                   Overall:                   benign lips     

                          2010                   None                

 

                    Full Exam - General                   Ears/Nose/Throat      

             

lips/teeth/gingiva                   Overall:                   benign gingiva  

                          2010                   None                

 

                    Full Exam - General                   Ears/Nose/Throat      

             

lips/teeth/gingiva                   Overall:                   normal dentition

                          2010                   None                

 

                    Full Exam - General                   Ears/Nose/Throat      

             oral 

cavity/pharynx/larynx                    Overall:                   tonsils 

benign                    2010                   None                

 

                    Full Exam - General                   Ears/Nose/Throat      

             oral 

cavity/pharynx/larynx                    Oropharynx:                   erythema 

                          2010                   None                

 

                    Full Exam - General                   Respiratory           

        auscultation

                          Overall:                   breath sounds clear bilater

ally    

                          2010                   None                

 

                    Full Exam - General                   Respiratory           

        respiratory 

effort/rhythm                   Overall:                   no retractions       

                          2010                   None                

 

                    Full Exam - General                   Respiratory           

        respiratory 

effort/rhythm                   Overall:                   normal rate          

                          2010                   occasionally coughs durin

g the exam        

       

 

                    Full Exam - General                   Cardiovascular        

           

auscultation of heart                   Overall:                   regular rate 

                          2010                   None                

 

                    Full Exam - General                   Cardiovascular        

           

auscultation of heart                   Overall:                   normal heart 

sounds                    2010                   None                

 

                    Full Exam - General                   Psychiatric           

        

orientation/consciousness                   Overall:                   oriented 

to person, place and time                   2010                   None   

            

 

                    Full Exam - General                   Constitutional        

            general 

appearance                   Overall:                   well nourished          

                          2010                   None                

 

                    Full Exam - General                   Constitutional        

            general 

appearance                   Overall:                   well developed          

                          2010                   None                

 

                    Full Exam - General                   Constitutional        

            general 

appearance                   Overall:                   in no acute distress    

                          2010                   None                

 

                    Full Exam - General                   Neurologic            

       mental status

                    Overall:                   alert                   

0 

                                        None                

 

                    Full Exam - General                   Cardiovascular        

           

auscultation of heart                   Rate:                     regular rate  

  

                          2010                   None                

 

                    Full Exam - General                   Cardiovascular        

           

auscultation of heart                   Rhythm:                   regular rhythm

                          2010                   None                

 

                    Full Exam - General                   Respiratory           

        auscultation

                          Right middle lung field:                   a normal ex

am      

                          2010                   None                

 

                    Full Exam - General                   Respiratory           

        auscultation

                          Right lower lung field:                   a normal exa

m       

                          2010                   None                

 

                    Full Exam - General                   Cardiovascular        

           

auscultation of heart                   Overall:                   regular rate 

                          2010                   None                

 

                    Full Exam - General                   Cardiovascular        

           

auscultation of heart                   Overall:                   normal heart 

sounds                    2010                   None                

 

                    Full Exam - General                   Cardiovascular        

           

auscultation of heart                   Overall:                   no murmurs   

                          2010                   None                

 

                    Full Exam - General                   Cardiovascular        

           

auscultation of heart                   S1:                       a normal exam 

    

                          2010                   None                

 

                    Full Exam - General                   Cardiovascular        

           

auscultation of heart                   S2:                       a normal exam 

    

                          2010                   None                

 

                    Full Exam - General                   Cardiovascular        

           

auscultation of heart                   S3 (ventricular gallop):                

                    present                   2010                   None 

               

 

                    Full Exam - General                   Abdomen               

    abdominal exam  

                    Overall:                   no tenderness                   

2010                              None                

 

                    Full Exam - General                   Neurologic            

       mental status

                    Overall:                   oriented                   

2010                              None                

 

                    Full Exam - General                   Psychiatric           

        mood and 

affect                    Overall:                   normal mood and affect     

 

                          2010                   None                

 

                    Full Exam - General                   Abdomen               

    abdominal exam  

                    Overall:                   no masses                   

2010                              None                

 

                    Full Exam - General                   Abdomen               

    abdominal exam  

                          Overall:                   normal bowel sounds        

          

                          2010                   None                

 

                    Full Exam - General                   Abdomen               

    abdominal exam  

                    Overall:                   soft                   2010

    

                                        None                

 

                    Full Exam - General                   Respiratory           

        auscultation

                          Overall:                   breath sounds clear bilater

ally    

                          2010                   None                

 

                    Full Exam - General                   Respiratory           

        auscultation

                    Diffuse:                   a normal exam                   

2010                              None                

 

                    Full Exam - General                   Respiratory           

        auscultation

                          Left upper lung field:                   a normal exam

        

                          2010                   None                

 

                    Full Exam - General                   Respiratory           

        auscultation

                          Left lower lung field:                   a normal exam

        

                          2010                   None                

 

                    Full Exam - General                   Respiratory           

        auscultation

                          Right upper lung field:                   a normal exa

m       

                          2010                   None                

 

                    Full Exam - General                   Neurologic            

       mental status

                    Overall:                   oriented                   

2010                              None                

 

                    Full Exam - General                   Psychiatric           

        mood and 

affect                   Mood:                   depressed                   

2010                              tearful                

 

                    Full Exam - General                   Neurologic            

       mental status

                    Overall:                   alert                   08/17/201

0 

                                        None                

 

                    Full Exam - General                   Cardiovascular        

           

auscultation of heart                   Rate:                     regular rate  

  

                          2010                   None                

 

                    Full Exam - General                   Cardiovascular        

           

auscultation of heart                   Rhythm:                   regular rhythm

                          2010                   None                

 

                    Full Exam - General                   Cardiovascular        

           

auscultation of heart                   S1:                       a normal exam 

    

                          2010                   None                

 

                    Full Exam - General                   Cardiovascular        

           

auscultation of heart                   S2:                       a normal exam 

    

                          2010                   None                

 

                    Full Exam - General                   Cardiovascular        

           

auscultation of heart                   S3 (ventricular gallop):                

                    present                   2010                   None 

               

 

                    Full Exam - General                   Cardiovascular        

           

extremities                   Overall:                   no clubbing            

                          2010                   None                

 

                    Full Exam - General                   Cardiovascular        

           

extremities                   Overall:                   No edema               

                          2010                   None                

 

                    Full Exam - General                   Cardiovascular        

           

extremities                   Overall:                   No cyanosis            

                          2010                   None                

 

                    Full Exam - General                   Psychiatric           

        mood and 

affect                   Mood:                   anxious                   

2010                              ringing hands                

 

                    Full Exam - General                   Psychiatric           

        mood and 

affect                    Affect:                   constricted                 

 

                          2010                   None                

 

                    Full Exam - General                   Respiratory           

        auscultation

                          Overall:                   breath sounds clear bilater

ally    

                          2010                   None                

 

                    Full Exam - General                   Respiratory           

        auscultation

                    Diffuse:                   a normal exam                   

2010                              None                

 

                    Full Exam - General                   Respiratory           

        auscultation

                          Left upper lung field:                   a normal exam

        

                          2010                   None                

 

                    Full Exam - General                   Respiratory           

        auscultation

                          Left lower lung field:                   a normal exam

        

                          2010                   None                

 

                    Full Exam - General                   Respiratory           

        auscultation

                          Right upper lung field:                   a normal exa

m       

                          2010                   None                

 

                    Full Exam - General                   Respiratory           

        auscultation

                          Right middle lung field:                   a normal ex

am      

                          2010                   None                

 

                    Full Exam - General                   Respiratory           

        auscultation

                          Right lower lung field:                   a normal exa

m       

                          2010                   None                

 

                    Full Exam - General                   Cardiovascular        

           

auscultation of heart                   Overall:                   regular rate 

                          2010                   None                

 

                    Full Exam - General                   Cardiovascular        

           

auscultation of heart                   Overall:                   normal heart 

sounds                    2010                   None                

 

                    Full Exam - General                   Cardiovascular        

           

auscultation of heart                   Overall:                   no murmurs   

                          2010                   None                

 

                    Full Exam - General                   Constitutional        

            general 

appearance                   Overall:                   well nourished          

                          2010                   None                

 

                    Full Exam - General                   Constitutional        

            general 

appearance                   Overall:                   well developed          

                          2010                   None                

 

                    Full Exam - General                   Constitutional        

            general 

appearance                   Overall:                   in no acute distress    

                          2010                   None                

 

                    Full Exam - General                   Psychiatric           

        mood and 

affect                    Overall:                   normal mood and affect     

 

                          2010                   None                

 

                    Full Exam - General                   Respiratory           

        auscultation

                          Right middle lung field:                   a normal ex

am      

                          2010                   None                

 

                    Full Exam - General                   Abdomen               

    abdominal exam  

                    Overall:                   no masses                   

2010                              None                

 

                    Full Exam - General                   Abdomen               

    abdominal exam  

                          Overall:                   normal bowel sounds        

          

                          2010                   None                

 

                    Full Exam - General                   Constitutional        

            general 

appearance                   Overall:                   well nourished          

                          2010                   None                

 

                    Full Exam - General                   Constitutional        

            general 

appearance                   Overall:                   well developed          

                          2010                   None                

 

                    Full Exam - General                   Constitutional        

            general 

appearance                   Overall:                   in no acute distress    

                          2010                   None                

 

                    Full Exam - General                   Respiratory           

        auscultation

                          Overall:                   breath sounds clear bilater

ally    

                          2010                   None                

 

                    Full Exam - General                   Respiratory           

        auscultation

                    Diffuse:                   a normal exam                   

2010                              None                

 

                    Full Exam - General                   Respiratory           

        auscultation

                          Left upper lung field:                   a normal exam

        

                          2010                   None                

 

                    Full Exam - General                   Respiratory           

        auscultation

                          Left lower lung field:                   a normal exam

        

                          2010                   None                

 

                    Full Exam - General                   Respiratory           

        auscultation

                          Right upper lung field:                   a normal exa

m       

                          2010                   None                

 

                    Full Exam - General                   Respiratory           

        auscultation

                          Right lower lung field:                   a normal exa

m       

                          2010                   None                

 

                    Full Exam - General                   Cardiovascular        

           

auscultation of heart                   Overall:                   regular rate 

                          2010                   None                

 

                    Full Exam - General                   Cardiovascular        

           

auscultation of heart                   Overall:                   normal heart 

sounds                    2010                   None                

 

                    Full Exam - General                   Cardiovascular        

           

auscultation of heart                   Overall:                   no murmurs   

                          2010                   None                

 

                    Full Exam - General                   Cardiovascular        

           

auscultation of heart                   Rate:                     regular rate  

  

                          2010                   None                

 

                    Full Exam - General                   Cardiovascular        

           

auscultation of heart                   Rhythm:                   regular rhythm

                          2010                   None                

 

                    Full Exam - General                   Cardiovascular        

           

auscultation of heart                   S1:                       a normal exam 

    

                          2010                   None                

 

                    Full Exam - General                   Abdomen               

    abdominal exam  

                    Overall:                   soft                   2010

    

                                        None                

 

                    Full Exam - General                   Abdomen               

    abdominal exam  

                          Epigastric:                   tender to palpation     

          

                          2010                   None                

 

                    Full Exam - General                   Neurologic            

       mental status

                    Overall:                   alert                   

0 

                                        None                

 

                    Full Exam - General                   Neurologic            

       mental status

                    Overall:                   oriented                   

2010                              None                

 

                    Full Exam - General                   Cardiovascular        

           

auscultation of heart                   S2:                       a normal exam 

    

                          2010                   None                

 

                    Full Exam - General                   Cardiovascular        

           

extremities                   Overall:                   no clubbing            

                          2010                   None                

 

                    Full Exam - General                   Cardiovascular        

           

extremities                   Overall:                   No edema               

                          2010                   None                

 

                    Full Exam - General                   Cardiovascular        

           

extremities                   Overall:                   No cyanosis            

                          2010                   None                

 

                    Full Exam - General                   Constitutional        

            general 

appearance                   Overall:                   well nourished          

                          2010                   None                

 

                    Full Exam - General                   Constitutional        

            general 

appearance                   Overall:                   well developed          

                          2010                   None                

 

                    Full Exam - General                   Constitutional        

            general 

appearance                   Overall:                   in no acute distress    

                          2010                   None                

 

                    Full Exam - General                   Respiratory           

        auscultation

                          Overall:                   breath sounds clear bilater

ally    

                          2010                   None                

 

                    Full Exam - General                   Respiratory           

        auscultation

                    Diffuse:                   a normal exam                   

2010                              None                

 

                    Full Exam - General                   Respiratory           

        auscultation

                          Left upper lung field:                   a normal exam

        

                          2010                   None                

 

                    Full Exam - General                   Respiratory           

        auscultation

                          Left lower lung field:                   a normal exam

        

                          2010                   None                

 

                    Full Exam - General                   Respiratory           

        auscultation

                          Right upper lung field:                   a normal exa

m       

                          2010                   None                

 

                    Full Exam - General                   Respiratory           

        auscultation

                          Right middle lung field:                   a normal ex

am      

                          2010                   None                

 

                    Full Exam - General                   Respiratory           

        auscultation

                          Right lower lung field:                   a normal exa

m       

                          2010                   None                

 

                    Full Exam - General                   Cardiovascular        

           

auscultation of heart                   Overall:                   regular rate 

                          2010                   None                

 

                    Full Exam - General                   Cardiovascular        

           

auscultation of heart                   Overall:                   normal heart 

sounds                    2010                   None                

 

                    Full Exam - General                   Cardiovascular        

           

auscultation of heart                   Murmur:                   previously 

known murmur unchanged                   2010                   None      

         

 

                    Full Exam - General                   Cardiovascular        

           

extremities                   Overall:                   no clubbing            

                          2010                   None                

 

                    Full Exam - General                   Cardiovascular        

           

extremities                   Overall:                   No cyanosis            

                          2010                   None                

 

                    Full Exam - General                   Cardiovascular        

           

extremities                   Edema present:                   severity 1+ - 4+:

trace                     2010                   None                

 

                    Full Exam - General                   Abdomen               

    abdominal exam  

                    Overall:                   no masses                   

2010                              None                

 

                    Full Exam - General                   Abdomen               

    abdominal exam  

                    Overall:                   no tenderness                   

2010                              None                

 

                    Full Exam - General                   Abdomen               

    abdominal exam  

                          Overall:                   normal bowel sounds        

          

                          2010                   None                

 

                    Full Exam - General                   Abdomen               

    abdominal exam  

                    Overall:                   soft                   2010

    

                                        None                

 

                    Full Exam - General                   Neurologic            

       mental status

                    Overall:                   alert                   

0 

                                        None                

 

                    Full Exam - General                   Neurologic            

       mental status

                    Overall:                   oriented                   

2010                              None                

 

                    Full Exam - General                   Psychiatric           

        mood and 

affect                   Affect:                   flat                   

2010                              None                



                                                                              



Procedures

                      



                    Procedure                   Codes                   Date    

            

 

                                              ROUTINE VENIPUNCTURE              

                       

CPT-4: 62897                            06/10/2019                

 

                                                            ASSAY THYROID STIM H

ORMONE                                  

                          CPT-4: 31492                   06/10/2019             

   

 

                                                            COMPREHEN METABOLIC 

PANEL                                   

                          CPT-4: 00033                   06/10/2019             

   

 

                                                            COMPLETE CBC W/AUTO 

DIFF WBC                                

                          CPT-4: 87959                   06/10/2019             

   

 

                                                            URINALYSIS NONAUTO W

/O SCOPE                                

                          CPT-4: 03011                   2019             

   

 

                                                            URINE CULTURE/ COLON

Y COUNT                                 

                          CPT-4: 25911                   2019             

   

 

                                                            URINE CULTURE/ COLON

Y COUNT                                 

                          CPT-4: 22989                   10/02/2018             

   

 

                                              ROUTINE VENIPUNCTURE              

                       

CPT-4: 70807                            2018                

 

                                              ASSAY OF FREE THYROXINE           

                          

CPT-4: 89463                            2018                

 

                                                            ASSAY THYROID STIM H

ORMONE                                  

                          CPT-4: 97476                   2018             

   

 

                                                            COMPLETE CBC W/AUTO 

DIFF WBC                                

                          CPT-4: 91362                   2018             

   

 

                                                            METABOLIC PANEL TOTA

L CA                                    

                          CPT-4: 50089                   2018             

   

 

                                                            FLU VACC PRSV FREE I

NC ANTIG 65 AND OLDER                   

                          CPT-4: 89646                   2018             

   

 

                                                            ASSAY, GLUCOSE, BLOO

D QUANT                                 

                          CPT-4: 68844                   2018             

   

 

                                                            ADMIN INFLUENZA VIRU

S VAC                                   

                          CPT-4:                    2018             

   

 

                                              ROUTINE VENIPUNCTURE              

                       

CPT-4: 13115                            2018                

 

                                                            COMPREHEN METABOLIC 

PANEL                                   

                          CPT-4: 79252                   2018             

   

 

                                                            COMPLETE CBC W/AUTO 

DIFF WBC                                

                          CPT-4: 45034                   2018             

   

 

                                              A1C HPLC                          

           CPT-4: 20802   

                                        2018                

 

                                              ASSAY OF TROPONIN QUANT           

                          

CPT-4: 75229                            2018                

 

                                              LIPID PANEL                       

              CPT-4: 69924

                                        2018                

 

                                                            THER/PROPH/DIAG INJ 

SC/IM                                   

                          CPT-4: 13462                   2018             

   

 

                                                            TRIAMCINOLONE ACET I

N[place] NOS                                  

                          CPT-4:                    2018             

   

 

                                                            URINALYSIS NONAUTO W

/O SCOPE                                

                          CPT-4: 19554                   2018             

   

 

                                                            URINE CULTURE/ COLON

Y COUNT                                 

                          CPT-4: 26440                   2018             

   

 

                                                            FLU VACC PRSV FREE I

NC ANTIG 65 AND OLDER                   

                          CPT-4: 39992                   10/06/2017             

   

 

                                                            ADMIN INFLUENZA VIRU

S VAC                                   

                          CPT-4:                    10/06/2017             

   

 

                                              ROUTINE VENIPUNCTURE              

                       

CPT-4: 54545                            2017                

 

                                                            COMPREHEN METABOLIC 

PANEL                                   

                          CPT-4: 04821                   2017             

   

 

                                                            COMPLETE CBC W/AUTO 

DIFF WBC                                

                          CPT-4: 05557                   2017             

   

 

                                              LIPID PANEL                       

              CPT-4: 22785

                                        2017                

 

                                              A1C HPLC                          

           CPT-4: 48632   

                                        2017                

 

                                                            ASSAY THYROID STIM H

ORMONE                                  

                          CPT-4: 11348                   2017             

   

 

                                              ROUTINE VENIPUNCTURE              

                       

CPT-4: 55428                            2017                

 

                                                            ASSAY THYROID STIM H

ORMONE                                  

                          CPT-4: 26398                   2017             

   

 

                                                            COMPREHEN METABOLIC 

PANEL                                   

                          CPT-4: 81401                   2017             

   

 

                                                            COMPLETE CBC W/AUTO 

DIFF WBC                                

                          CPT-4: 60019                   2017             

   

 

                                              A1C HPLC                          

           CPT-4: 58538   

                                        2017                

 

                                                            FLU VACC PRSV FREE I

NC ANTIG 65 AND OLDER                   

                          CPT-4: 31449                   10/07/2016             

   

 

                                                            ADMIN INFLUENZA VIRU

S VAC                                   

                          CPT-4:                    10/07/2016             

   

 

                                              ROUTINE VENIPUNCTURE              

                       

CPT-4: 60336                            2016                

 

                                              ASSAY OF FREE THYROXINE           

                          

CPT-4: 51748                            2016                

 

                                                            ASSAY THYROID STIM H

ORMONE                                  

                          CPT-4: 34585                   2016             

   

 

                                                            COMPREHEN METABOLIC 

PANEL                                   

                          CPT-4: 16416                   2016             

   

 

                                                            COMPLETE CBC W/AUTO 

DIFF WBC                                

                          CPT-4: 06257                   2016             

   

 

                                              LIPID PANEL                       

              CPT-4: 48008

                                        2016                

 

                                              A1C HPLC                          

           CPT-4: 34168   

                                        2016                

 

                                                            URINALYSIS NONAUTO W

/O SCOPE                                

                          CPT-4: 54901                   2016             

   

 

                                              ROUTINE VENIPUNCTURE              

                       

CPT-4: 81475                            2015                

 

                                                            METABOLIC PANEL TOTA

L CA                                    

                          CPT-4: 41666                   2015             

   

 

                                                            PRESCRIP TRANSMIT VI

A ERX SY                                

                          CPT-4:                    2015             

   

 

                                                            FLU VACC PRSV FREE I

NC ANTIG 65 AND OLDER                   

                          CPT-4: 11295                   10/16/2015             

   

 

                                                            ADMIN INFLUENZA VIRU

S VAC                                   

                          CPT-4:                    10/16/2015             

   

 

                                                            URINALYSIS NONAUTO W

/O SCOPE                                

                          CPT-4: 64686                   09/15/2015             

   

 

                                                            URINE CULTURE/ COLON

Y COUNT                                 

                          CPT-4: 90678                   09/15/2015             

   

 

                                              ROUTINE VENIPUNCTURE              

                       

CPT-4: 35264                            09/10/2015                

 

                                              ASSAY OF FREE THYROXINE           

                          

CPT-4: 31884                            09/10/2015                

 

                                                            ASSAY THYROID STIM H

ORMONE                                  

                          CPT-4: 14136                   09/10/2015             

   

 

                                                            COMPREHEN METABOLIC 

PANEL                                   

                          CPT-4: 91390                   09/10/2015             

   

 

                                                            COMPLETE CBC W/AUTO 

DIFF WBC                                

                          CPT-4: 93240                   09/10/2015             

   

 

                                              LIPID PANEL                       

              CPT-4: 81110

                                        09/10/2015                

 

                                              A1C HPLC                          

           CPT-4: 52977   

                                        09/10/2015                

 

                                              CERUM REMOVAL                     

                CPT-4: 

19353                                   2015                

 

                                                            PRESCRIP TRANSMIT VI

A ERX SY                                

                          CPT-4:                    2015             

   

 

                                              FLUZONE, 5ML (Medicare)           

                          

CPT-4:                             10/17/2014                

 

                                                            ADMIN INFLUENZA VIRU

S VAC                                   

                          CPT-4:                    10/17/2014             

   

 

                                                            PRESCRIP TRANSMIT VI

A ERX SY                                

                          CPT-4:                    2014             

   

 

                                                            PRESCRIP TRANSMIT VI

A ERX SY                                

                          CPT-4:                    2014             

   

 

                                                            PRESCRIP TRANSMIT VI

A ERX SY                                

                          CPT-4:                    2014             

   

 

                                              ROUTINE VENIPUNCTURE              

                       

CPT-4: 08394                            2014                

 

                                              ASSAY OF FREE THYROXINE           

                          

CPT-4: 52161                            2014                

 

                                                            ASSAY THYROID STIM H

ORMONE                                  

                          CPT-4: 86606                   2014             

   

 

                                                            COMPREHEN METABOLIC 

PANEL                                   

                          CPT-4: 40159                   2014             

   

 

                                                            COMPLETE CBC W/AUTO 

DIFF WBC                                

                          CPT-4: 01688                   2014             

   

 

                                              LIPID PANEL                       

              CPT-4: 88392

                                        2014                

 

                                              A1C HPLC                          

           CPT-4: 49355   

                                        2014                

 

                                              VITAMIN B 12 FOLIC ACID           

                          

CPT-4: 98661|63195                      2014                

 

                                                            PRESCRIP TRANSMIT VI

A ERX SY                                

                          CPT-4:                    2014             

   

 

                                                            PRESCRIP TRANSMIT VI

A ERX SY                                

                          CPT-4:                    10/15/2013             

   

 

                                              FLUZONE, 5ML (Medicare)           

                          

CPT-4:                             2013                

 

                                                            ADMIN INFLUENZA VIRU

S VAC                                   

                          CPT-4:                    2013             

   

 

                                                            PRESCRIP TRANSMIT VI

A ERX SY                                

                          CPT-4:                    2013             

   

 

                                                            PRESCRIP TRANSMIT VI

A ERX SY                                

                          CPT-4:                    05/10/2013             

   

 

                                              ROUTINE VENIPUNCTURE              

                       

CPT-4: 29042                            2012                

 

                                              ASSAY OF FREE THYROXINE           

                          

CPT-4: 54814                            2012                

 

                                                            ASSAY THYROID STIM H

ORMONE                                  

                          CPT-4: 46828                   2012             

   

 

                                                            COMPREHEN METABOLIC 

PANEL                                   

                          CPT-4: 01854                   2012             

   

 

                                                            COMPLETE CBC W/AUTO 

DIFF WBC                                

                          CPT-4: 60777                   2012             

   

 

                                              LIPID PANEL                       

              CPT-4: 11378

                                        2012                

 

                                                            A1C GLYCOSYLATED HEM

OGLOBIN TEST                            

                          CPT-4: 08972                   2012             

   

 

                                              CERUM REMOVAL                     

                CPT-4: 

96113                                   2012                

 

                                                            PRESCRIP TRANSMIT VI

A ERX SY                                

                          CPT-4:                    2012             

   

 

                                                            PRESCRIP TRANSMIT VI

A ERX SY                                

                          CPT-4:                    10/10/2012             

   

 

                                              FLUZONE, 5ML (Medicare)           

                          

CPT-4:                             2012                

 

                                                            ADMIN INFLUENZA VIRU

S VAC                                   

                          CPT-4:                    2012             

   

 

                                                            ASSAY, GLUCOSE, BLOO

D QUANT                                 

                          CPT-4: 24871                   2012             

   

 

                                                            URINALYSIS NONAUTO W

/O SCOPE                                

                          CPT-4: 66841                   2012             

   

 

                                                            URINE CULTURE/ COLON

Y COUNT                                 

                          CPT-4: 94230                   2012             

   

 

                                              ROUTINE VENIPUNCTURE              

                       

CPT-4: 83886                            2012                

 

                                              ASSAY OF FREE THYROXINE           

                          

CPT-4: 47855                            2012                

 

                                                            ASSAY THYROID STIM H

ORMONE                                  

                          CPT-4: 18711                   2012             

   

 

                                                            COMPREHEN METABOLIC 

PANEL                                   

                          CPT-4: 21212                   2012             

   

 

                                                            COMPLETE CBC W/AUTO 

DIFF WBC                                

                          CPT-4: 23122                   2012             

   

 

                                              LIPID PANEL                       

              CPT-4: 70267

                                        2012                

 

                                              ASSAY OF INSULIN                  

                   CPT-4: 

44111                                   2012                

 

                                                            A1C GLYCOSYLATED HEM

OGLOBIN TEST                            

                          CPT-4: 92137                   2012             

   

 

                                                            DRAIN/INJECT JOINT/B

URSA                                    

                          CPT-4: 82458                   2012             

   

 

                                                            METHYLPREDNISOLONE 4

0 MG INJ                                

                          CPT-4:                    2012             

   

 

                                                            TRIAMCINOLONE ACET I

[place] NOS                                  

                          CPT-4:                    2012             

   

 

                                                            PRESCRIP TRANSMIT VI

A ERX SY                                

                          CPT-4:                    2012             

   

 

                                                            PRESCRIP TRANSMIT VI

A ERX SY                                

                          CPT-4:                    2012             

   

 

                                                            METHYLPREDNISOLONE 4

0 MG INJ                                

                          CPT-4:                    2012             

   

 

                                                            DRAIN/INJECT JOINT/B

URSA                                    

                          CPT-4: 36036                   2012             

   

 

                                                            TRIAMCINOLONE ACET I

[place] NOS                                  

                          CPT-4:                    2012             

   

 

                                                            PRESCRIP TRANSMIT VI

A ERX SY                                

                          CPT-4:                    2012             

   

 

                                              ROUTINE VENIPUNCTURE              

                       

CPT-4: 61390                            2012                

 

                                              ASSAY OF FREE THYROXINE           

                          

CPT-4: 01316                            2012                

 

                                                            ASSAY THYROID STIM H

ORMONE                                  

                          CPT-4: 89714                   2012             

   

 

                                                            COMPREHEN METABOLIC 

PANEL                                   

                          CPT-4: 37006                   2012             

   

 

                                                            COMPLETE CBC W/AUTO 

DIFF WBC                                

                          CPT-4: 89578                   2012             

   

 

                                              LIPID PANEL                       

              CPT-4: 13225

                                        2012                

 

                                                            PRESCRIP TRANSMIT VI

A ERX SY                                

                          CPT-4:                    2012             

   

 

                                              CERUM REMOVAL                     

                CPT-4: 

43892                                   2011                

 

                                                            PRESCRIP TRANSMIT VI

A ERX SY                                

                          CPT-4:                    2011             

   

 

                                              FLUZONE, 5ML (Medicare)           

                          

CPT-4:                             10/05/2011                

 

                                                            ADMIN INFLUENZA VIRU

S VAC                                   

                          CPT-4:                    10/05/2011             

   

 

                                                            PRESCRIP TRANSMIT VI

A ERX SY                                

                          CPT-4:                    2011             

   

 

                                                            URINALYSIS NONAUTO W

/O SCOPE                                

                          CPT-4: 86900                   2011             

   

 

                                                            URINE CULTURE/ COLON

Y COUNT                                 

                          CPT-4: 81408                   2011             

   

 

                                              CUR TOBACCO NON-USER              

                       

CPT-4:                             2011                

 

                                              ROUTINE VENIPUNCTURE              

                       

CPT-4: 02840                            2011                

 

                                                            COMPLETE CBC W/AUTO 

DIFF WBC                                

                          CPT-4: 53827                   2011             

   

 

                                                            COMPREHEN METABOLIC 

PANEL                                   

                          CPT-4: 25247                   2011             

   

 

                                              LIPID PANEL                       

              CPT-4: 82629

                                        2011                

 

                                                            ASSAY THYROID STIM H

ORMONE                                  

                          CPT-4: 76923                   2011             

   

 

                                              ASSAY OF FREE THYROXINE           

                          

CPT-4: 27671                            2011                

 

                                                            PRESCRIP TRANSMIT VI

A ERX SY                                

                          CPT-4:                    2011             

   

 

                                                            INJ TRIGGER POINT 1/

2 MUSCL                                 

                          CPT-4: 86358                   2011             

   

 

                                                            TRIAMCINOLONE ACET I

[place] NOS                                  

                          CPT-4:                    2011             

   

 

                                                            METHYLPREDNISOLONE 4

0 MG INJ                                

                          CPT-4:                    2011             

   

 

                                                            THER/PROPH/DIAG INJ 

SC/IM                                   

                          CPT-4: 27308                   2011             

   

 

                                                            KETOROLAC TROMETHAMI

NE INJ                                  

                          CPT-4:                    2011             

   

 

                                                            PRESCRIP TRANSMIT VI

A ERX SY                                

                          CPT-4:                    2010             

   

 

                                                            FLU VACCINE 3 YRS & 

> IM UP 63                              

                          CPT-4: 27698                   10/06/2010             

   

 

                                                            ADMIN INFLUENZA VIRU

S VAC                                   

                          CPT-4:                    10/06/2010             

   

 

                                                            URINALYSIS NONAUTO W

/O SCOPE                                

                          CPT-4: 64556                   2010             

   

 

                                                            URINE CULTURE/ COLON

Y COUNT                                 

                          CPT-4: 71904                   2010             

   

 

                                                            PRESCRIP TRANSMIT VI

A ERX SY                                

                          CPT-4:                    2010             

   

 

                                                            THER/PROPH/DIAG INJ 

SC/IM                                   

                          CPT-4: 58041                   2010             

   

 

                                              VITAMIN B12 INJECTION             

                        

CPT-4:                             2010                

 

                                                            THER/PROPH/DIAG INJ 

SC/IM                                   

                          CPT-4: 03852                   2010             

   

 

                                              VITAMIN B12 INJECTION             

                        

CPT-4:                             2010                

 

                                              ROUTINE VENIPUNCTURE              

                       

CPT-4: 54884                            2010                



                                                                                
                                                                                
                                                                                
                                                                                
                                                                                
                                                                                
                                                                                
                                                                                
                                                                                
                                                                                
                                                                                
                                                                                
                                                                                
                                                                                
                                                                                
                                                                                
                                                                                
                                                                                
                                                                                
                                                           



Vital Signs

                      



                          Date                      Vital                

 

                          06/10/2019                                       Blood

 Pressure 1: 144/70 Code: 

8480-6                                                         Heart Rate 1: 60 

bpm                                                         Respiratory Rate: 20

bpm                                                         SpO2: 95%           

                                                            Temperature: 36.4 (C

) / 97.6 (F)   

                                                            Weight: 214 lbs     

       

                      

 

                          2019                                       Blood

 Pressure 1: 128/78 Code: 

8480-6                                                         Heart Rate 1: 56 

bpm                                                         Respiratory Rate: 20

bpm                                                         SpO2: 98%           

                                                            Temperature: 36.3 (C

) / 97.4 (F)   

                                                            Weight: 213 lbs     

       

                      

 

                          2018                                       Blood

 Pressure 1: 142/60 Code: 

8480-6                                                         BMI: 38.2 Code: 

32490-6                                                         Heart Rate 1: 48

bpm                                                         Height: 5'2"        

                                                            Respiratory Rate: 20

 bpm        

                                                            SpO2: 98%           

            

                                                            Temperature: 36.7 (C

) / 98.1 (F)               

                                                            Weight: 212 lbs     

                   

          

 

                          2018                                       Blood

 Pressure 1: 142/64 Code: 

8480-6                                                         BMI: 38.5 Code: 

28965-9                                                         Heart Rate 1: 52

bpm                                                         Height: 5'2"        

                                                            Respiratory Rate: 22

 bpm        

                                                            SpO2: 96%           

            

                                                            Temperature: 36.1 (C

) / 96.9 (F)               

                                                            Weight: 214 lbs     

                   

          

 

                          10/02/2018                                       Blood

 Pressure 1: 124/80 Code: 

8480-6                                                         BMI: 38.3 Code: 

73258-5                                                         Heart Rate 1: 68

bpm                                                         Height: 5'2"        

                                                            Respiratory Rate: 20

 bpm        

                                                            Temperature: 36.3 (C

) / 97.4 (F)

                                                            Weight: 213 lbs     

    

                         

 

                          2018                                       Blood

 Pressure 1: 132/78 Code: 

8480-6                                                         BMI: 37.6 Code: 

07629-0                                                         Heart Rate 1: 68

bpm                                                         Height: 5'2"        

                                                            Respiratory Rate: 20

 bpm        

                                                            SpO2: 97%           

            

                                                            Temperature: 36.8 (C

) / 98.2 (F)               

                                                            Weight: 209 lbs     

                   

          

 

                          2018                                       Blood

 Pressure 1: 150/76 Code: 

8480-6                                                         BMI: 38.5 Code: 

51304-3                                                         Heart Rate 1: 64

bpm                                                         Height: 5'2"        

                                                            Respiratory Rate: 20

 bpm        

                                                            SpO2: 97%           

            

                                                            Temperature: 36.2 (C

) / 97.2 (F)               

                                                            Weight: 214 lbs     

                   

          

 

                          2018                                       Blood

 Pressure 1: 122/74 Code: 

8480-6                                                         BMI: 38.2 Code: 

93811-9                                                         Heart Rate 1: 64

bpm                                                         Height: 5'2"        

                                                            Respiratory Rate: 18

 bpm        

                                                            SpO2: 96%           

            

                                                            Temperature: 35.8 (C

) / 96.4 (F)               

                                                            Weight: 212 lbs     

                   

          

 

                          2018                                       Blood

 Pressure 1: 124/78 Code: 

8480-6                                                         BMI: 37.8 Code: 

84427-7                                                         Heart Rate 1: 76

bpm                                                         Height: 5'2"        

                                                            Respiratory Rate: 20

 bpm        

                                                            Temperature: 36.8 (C

) / 98.3 (F)

                                                            Weight: 210 lbs     

    

                         

 

                          2018                                       Blood

 Pressure 1: 136/70 Code: 

8480-6                                                         BMI: 38.0 Code: 

10609-0                                                         Heart Rate 1: 68

bpm                                                         Height: 5'2"        

                                                            Respiratory Rate: 20

 bpm        

                                                            SpO2: 97%           

            

                                                            Temperature: 36.8 (C

) / 98.2 (F)               

                                                            Weight: 211 lbs     

                   

          

 

                          2018                                       Blood

 Pressure 1: 140/65 Code: 

8480-6                                                         Heart Rate 1: 75 

bpm                                                         Respiratory Rate: 24

bpm                                                         SpO2: 95%           

                                                            Temperature: 37.0 (C

) / 98.6 (F)   

                                                            Weight: 211 lbs     

       

                      

 

                          2018                                       Blood

 Pressure 1: 154/70 Code: 

8480-6                                                         BMI: 37.6 Code: 

89321-1                                                         Heart Rate 1: 76

bpm                                                         Height: 5'2"        

                                                            Respiratory Rate: 20

 bpm        

                                                            SpO2: 98%           

            

                                                            Temperature: 36.9 (C

) / 98.5 (F)               

                                                            Weight: 209 lbs     

                   

          

 

                          2017                                       Blood

 Pressure 1: 156/70 Code: 

8480-6                                                         BMI: 37.1 Code: 

98708-4                                                         Heart Rate 1: 72

bpm                                                         Height: 5'2"        

                                                            Respiratory Rate: 20

 bpm        

                                                            SpO2: 97%           

            

                                                            Temperature: 37.0 (C

) / 98.6 (F)               

                                                            Weight: 206 lbs     

                   

          

 

                          2017                                       Blood

 Pressure 1: 152/78 Code: 

8480-6                                                         BMI: 36.8 Code: 

73234-4                                                         Heart Rate 1: 78

bpm                                                         Height: 5'2"        

                                                            Respiratory Rate: 20

 bpm        

                                                            SpO2: 98%           

            

                                                            Temperature: 36.1 (C

) / 97.0 (F)               

                                                            Weight: 204 lbs     

                   

          

 

                          2017                                       Blood

 Pressure 1: 142/70 Code: 

8480-6                                                         BMI: 36.9 Code: 

97407-2                                                         Heart Rate 1: 64

bpm                                                         Height: 5'2"        

                                                            Respiratory Rate: 20

 bpm        

                                                            SpO2: 96%           

            

                                                            Temperature: 36.5 (C

) / 97.7 (F)               

                                                            Weight: 205 lbs     

                   

          

 

                          2017                                       Blood

 Pressure 1: 142/68 Code: 

8480-6                                                         Heart Rate 1: 88 

bpm                                                         Respiratory Rate: 18

bpm                                                         SpO2: 98%           

                                                            Temperature: 36.3 (C

) / 97.3 (F)   

                                                            Weight: 209 lbs     

       

                      

 

                          2016                                       Blood

 Pressure 1: 130/78 Code: 

8480-6                                                         Heart Rate 1: 68 

bpm                                                         Respiratory Rate: 20

bpm                                                         SpO2: 95%           

                                                            Temperature: 36.4 (C

) / 97.6 (F)   

                                                            Weight: 212 lbs     

       

                      

 

                          2016                                       Blood

 Pressure 1: 122/64 Code: 

8480-6                                                         BMI: 39.1 Code: 

13796-8                                                         Heart Rate 1: 76

bpm                                                         Height: 5'2"        

                                                            Respiratory Rate: 20

 bpm        

                                                            Temperature: 36.8 (C

) / 98.2 (F)

                                                            Weight: 217 lbs     

    

                         

 

                          2016                                       Blood

 Pressure 1: 144/70 Code: 

8480-6                                                         BMI: 39.4 Code: 

89354-6                                                         Heart Rate 1: 76

bpm                                                         Height: 5'2"        

                                                            Respiratory Rate: 20

 bpm        

                                                            Temperature: 36.6 (C

) / 97.9 (F)

                                                            Weight: 219 lbs     

    

                         

 

                          2015                                       Blood

 Pressure 1: 152/60 Code: 

8480-6                                                         BMI: 39.6 Code: 

41237-0                                                         Heart Rate 1: 84

bpm                                                         Height: 5'2"        

                                                            Respiratory Rate: 20

 bpm        

                                                            Temperature: 37.0 (C

) / 98.6 (F)

                                                            Weight: 220 lbs     

    

                         

 

                          2015                                       Blood

 Pressure 1: 146/76 Code: 

8480-6                                                         BMI: 39.8 Code: 

69537-8                                                         Heart Rate 1: 88

bpm                                                         Height: 5'2"        

                                                            Respiratory Rate: 20

 bpm        

                                                            Temperature: 37.0 (C

) / 98.6 (F)

                                                            Weight: 221 lbs     

    

                         

 

                          2015                                       Blood

 Pressure 1: 132/70 Code: 

8480-6                                                         BMI: 39.1 Code: 

06004-0                                                         Heart Rate 1: 88

bpm                                                         Height: 5'2"        

                                                            Respiratory Rate: 20

 bpm        

                                                            Temperature: 36.4 (C

) / 97.6 (F)

                                                            Weight: 217 lbs     

    

                         

 

                          2015                                       Blood

 Pressure 1: 132/66 Code: 

8480-6                                                         BMI: 39.9 Code: 

57855-2                                                         Heart Rate 1: 72

bpm                                                         Height: 5'2"        

                                                            Respiratory Rate: 20

 bpm        

                                                            Temperature: 36.9 (C

) / 98.4 (F)

                                                            Weight: 218 lbs     

    

                         

 

                          2015                                       Blood

 Pressure 1: 136/80 Code: 

8480-6                                                         Heart Rate 1: 76 

bpm                                                         Respiratory Rate: 20

bpm                                                         Temperature: 36.7 

(C) / 98.0 (F)                                                         Weight: 

224 lbs                                   

 

                          2015                                       Blood

 Pressure 1: 134/78 Code: 

8480-6                                                         BMI: 39.7 Code: 

69592-9                                                         Heart Rate 1: 84

bpm                                                         Height: 5'2"        

                                                            Respiratory Rate: 20

 bpm        

                                                            Temperature: 36.7 (C

) / 98.0 (F)

                                                            Weight: 217 lbs     

    

                         

 

                          2014                                       Blood

 Pressure 1: 146/78 Code: 

8480-6                                                         BMI: 39.5 Code: 

45652-9                                                         Heart Rate 1: 82

bpm                                                         Height: 5'2"        

                                                            Respiratory Rate: 18

 bpm        

                                                            Temperature: 35.6 (C

) / 96.1 (F)

                                                            Weight: 216 lbs     

    

                         

 

                          2014                                       Blood

 Pressure 1: 134/70 Code: 

8480-6                                                         BMI: 37.9 Code: 

05845-8                                                         Heart Rate 1: 80

bpm                                                         Height: 5'3"        

                                                            Respiratory Rate: 20

 bpm        

                                                            Temperature: 36.8 (C

) / 98.2 (F)

                                                            Weight: 214 lbs     

    

                         

 

                          2014                                       Blood

 Pressure 1: 132/70 Code: 

8480-6                                                         BMI: 37.6 Code: 

32778-1                                                         Heart Rate 1: 80

bpm                                                         Height: 5'3"        

                                                            Respiratory Rate: 20

 bpm        

                                                            Temperature: 36.8 (C

) / 98.3 (F)

                                                            Weight: 212 lbs     

    

                         

 

                          2014                                       Blood

 Pressure 1: 116/74 Code: 

8480-6                                                         Heart Rate 1: 68 

bpm                                                         Respiratory Rate: 20

bpm                                                         Temperature: 36.2 

(C) / 97.1 (F)                                                         Weight: 

212 lbs                                   

 

                          10/15/2013                                       Blood

 Pressure 1: 132/82 Code: 

8480-6                                                         BMI: 37.4 Code: 

36682-4                                                         Heart Rate 1: 76

bpm                                                         Height: 5'3"        

                                                            Respiratory Rate: 20

 bpm        

                                                            Temperature: 36.7 (C

) / 98.0 (F)

                                                            Weight: 211 lbs     

    

                         

 

                          2013                                       Blood

 Pressure 1: 130/76 Code: 

8480-6                                                         Heart Rate 1: 78 

bpm                                  

 

                          2013                                       Blood

 Pressure 1: 140/82 Code: 

8480-6                                                         BMI: 36.8 Code: 

18508-1                                                         Heart Rate 1: 66

bpm                                                         Height: 5'3"        

                                                            Respiratory Rate: 20

 bpm        

                                                            Temperature: 36.1 (C

) / 96.9 (F)

                                                            Weight: 208 lbs     

    

                         

 

                          2013                                       Blood

 Pressure 1: 138/80 Code: 

8480-6                                                         BMI: 36.4 Code: 

52992-6                                                         Heart Rate 1: 72

bpm                                                         Height: 5'4"        

                                                            Respiratory Rate: 20

 bpm        

                                                            Temperature: 36.7 (C

) / 98.0 (F)

                                                            Weight: 212 lbs     

    

                         

 

                          2013                                       Blood

 Pressure 1: 124/70 Code: 

8480-6                                                         BMI: 36.9 Code: 

23940-1                                                         Heart Rate 1: 60

bpm                                                         Height: 5'4"        

                                                            Temperature: 36.1 (C

) / 97.0 (F)

                                                            Weight: 215 lbs     

    

                         

 

                          05/10/2013                                       Blood

 Pressure 1: 132/86 Code: 

8480-6                                                         BMI: 36.9 Code: 

80704-1                                                         Heart Rate 1: 76

bpm                                                         Height: 5'4"        

                                                            Respiratory Rate: 20

 bpm        

                                                            Temperature: 36.8 (C

) / 98.2 (F)

                                                            Weight: 215 lbs     

    

                         

 

                          2013                                       Blood

 Pressure 1: 134/82 Code: 

8480-6                                                         BMI: 36.6 Code: 

15300-2                                                         Heart Rate 1: 72

bpm                                                         Height: 5'4"        

                                                            Respiratory Rate: 20

 bpm        

                                                            Temperature: 36.3 (C

) / 97.4 (F)

                                                            Weight: 213 lbs     

    

                         

 

                          2012                                       Blood

 Pressure 1: 142/80 Code: 

8480-6                                                         BMI: 37.1 Code: 

22751-3                                                         Heart Rate 1: 76

bpm                                                         Height: 5'4"        

                                                            Respiratory Rate: 20

 bpm        

                                                            Temperature: 36.8 (C

) / 98.3 (F)

                                                            Weight: 216 lbs     

    

                         

 

                          10/23/2012                                       Blood

 Pressure 1: 128/68 Code: 

8480-6                                                         BMI: 37.6 Code: 

64682-1                                                         Heart Rate 1: 72

bpm                                                         Height: 5'4"        

                                                            Respiratory Rate: 20

 bpm        

                                                            Temperature: 36.6 (C

) / 97.9 (F)

                                                            Weight: 219 lbs     

    

                         

 

                          10/10/2012                                       Blood

 Pressure 1: 122/70 Code: 

8480-6                                                         Heart Rate 1: 76 

bpm                                                         Respiratory Rate: 20

bpm                                                         Temperature: 36.7 

(C) / 98.1 (F)                                                         Weight: 

218 lbs                                   

 

                          2012                                       Blood

 Pressure 1: 132/80 Code: 

8480-6                                                         Heart Rate 1: 84 

bpm                                  

 

                          2012                                       Blood

 Pressure 1: 128/78 Code: 

8480-6                                                         BMI: 38.1 Code: 

88468-3                                                         Heart Rate 1: 84

bpm                                                         Height: 5'4"        

                                                            Respiratory Rate: 20

 bpm        

                                                            Temperature: 36.9 (C

) / 98.4 (F)

                                                            Weight: 222 lbs     

    

                         

 

                          2012                                       Blood

 Pressure 1: 138/80 Code: 

8480-6                                                         BMI: 37.9 Code: 

90534-6                                                         Heart Rate 1: 74

bpm                                                         Height: 5'4"        

                                                            Temperature: 36.1 (C

) / 97.0 (F)

                                                            Weight: 221 lbs     

    

                         

 

                          2012                                       Blood

 Pressure 1: 126/78 Code: 

8480-6                                                         BMI: 38.4 Code: 

80319-6                                                         Heart Rate 1: 72

bpm                                                         Height: 5'4"        

                                                            Respiratory Rate: 20

 bpm        

                                                            Temperature: 36.7 (C

) / 98.0 (F)

                                                            Weight: 224 lbs     

    

                         

 

                          2012                                       Blood

 Pressure 1: 138/72 Code: 

8480-6                                                         BMI: 38.4 Code: 

39899-5                                                         Heart Rate 1: 72

bpm                                                         Height: 5'4"        

                                                            Respiratory Rate: 20

 bpm        

                                                            Temperature: 36.6 (C

) / 97.9 (F)

                                                            Weight: 224 lbs     

    

                         

 

                          2012                                       Blood

 Pressure 1: 122/78 Code: 

8480-6                                                         BMI: 38.8 Code: 

48009-7                                                         Heart Rate 1: 88

bpm                                                         Height: 5'4"        

                                                            Respiratory Rate: 20

 bpm        

                                                            Temperature: 36.6 (C

) / 97.8 (F)

                                                            Weight: 226 lbs     

    

                         

 

                          2012                                       Blood

 Pressure 1: 116/60 Code: 

8480-6                                                         BMI: 38.1 Code: 

21997-9                                                         Heart Rate 1: 92

bpm                                                         Height: 5'4"        

                                                            Respiratory Rate: 20

 bpm        

                                                            Temperature: 36.8 (C

) / 98.2 (F)

                                                            Weight: 222 lbs     

    

                         

 

                          2011                                       Blood

 Pressure 1: 118/62 Code: 

8480-6                                                         BMI: 37.9 Code: 

17798-9                                                         Heart Rate 1: 80

bpm                                                         Height: 5'4"        

                                                            Temperature: 36.5 (C

) / 97.7 (F)

                                                            Weight: 221 lbs     

    

                         

 

                          2011                                       Blood

 Pressure 1: 132/70 Code: 

8480-6                                                         Heart Rate 1: 84 

bpm                                                         Respiratory Rate: 20

bpm                                                         Temperature: 36.7 

(C) / 98.0 (F)                                                         Weight: 

221 lbs                                   

 

                          2011                                       Blood

 Pressure 1: 114/72 Code: 

8480-6                                                         BMI: 37.6 Code: 

06762-6                                                         Heart Rate 1: 76

bpm                                                         Height: 5'4"        

                                                            Respiratory Rate: 20

 bpm        

                                                            Temperature: 36.8 (C

) / 98.3 (F)

                                                            Weight: 219 lbs     

    

                         

 

                          2011                                       Blood

 Pressure 1: 136/76 Code: 

8480-6                                                         Temperature: 36.0

(C) / 96.8 (F)                                                         Weight: 

219 lbs 8 oz                                  

 

                          2011                                       Blood

 Pressure 1: 128/80 Code: 

8480-6                                                         Heart Rate 1: 72 

bpm                                                         Temperature: 36.3 

(C) / 97.4 (F)                                                         Weight: 

218 lbs                                   

 

                          2011                                       Blood

 Pressure 1: 126/70 Code: 

8480-6                                                         Heart Rate 1: 72 

bpm                                                         Temperature: 36.7 

(C) / 98.0 (F)                                  

 

                          2010                                       Blood

 Pressure 1: 126/78 Code: 

8480-6                                                         Temperature: 36.2

(C) / 97.1 (F)                                                         Weight: 

217 lbs 8 oz                                  

 

                          2010                                       Blood

 Pressure 1: 116/70 Code: 

8480-6                                                         Heart Rate 1: 72 

bpm                                                         Temperature: 36.4 

(C) / 97.6 (F)                                                         Weight: 

222 lbs                                   

 

                          2010                                       Blood

 Pressure 1: 114/78 Code: 

8480-6                                                         Heart Rate 1: 72 

bpm                                                         Temperature: 37.1 

(C) / 98.8 (F)                                                         Weight: 

225 lbs                                   

 

                          2010                                       Blood

 Pressure 1: 128/78 Code: 

8480-6                                                         Heart Rate 1: 76 

bpm                                                         Temperature: 36.6 

(C) / 97.8 (F)                                                         Weight: 

224 lbs                                   

 

                          2010                                       Blood

 Pressure 1: 116/78 Code: 

8480-6                                                         Heart Rate 1: 80 

bpm                                                         Temperature: 36.4 

(C) / 97.6 (F)                                                         Weight: 

226 lbs                                   

 

                          2010                                       Blood

 Pressure 1: 120/70 Code: 

8480-6                                                         BMI: 38.3 Code: 

74336-2                                                         Heart Rate 1: 88

bpm                                                         Height: 5'5"        

                                                            Temperature: 36.4 (C

) / 97.6 (F)

                                                            Weight: 230 lbs     

    

                         



                                                                                
                                                                                
                                                                                
                                                                                
                                                                                
                                                                                
                                                                                
                                                                                
          



Functional Status

          No Functional Status data                                             
            



History of Present Illness

                      



                    Symptom Name                   Status                   Resu

lt                  

                          Effective Date                   Notes                

 

                                   Quality                   chronic            

       

06/10/2019                              None                

 

                                   Quality                   stable             

      06/10/2019

                                        None                

 

                                   Quality                   chronic            

       

06/10/2019                              None                

 

                                   Quality                   hesitancy          

         

2019                              None                

 

                                   Quality                   stable             

      2019

                                        None                

 

                                   Quality                   acute              

     2019 

                                        None                

 

                                   Quality                   improving          

         

2019                              back on omeprazole--finished all of H py

saran regimen

but had pain in swelling in feet and hands with protonix                

 

                    gastroesophageal reflux                   Quality           

        

regurgitation of acid                   2018                   None       

        

 

                    gastroesophageal reflux                   Quality           

        chronic     

                          2018                   None                

 

                    chest pain/pressure                   Location              

     in the 

substernal area                   2018                   None             

  

 

                    chest pain/pressure                   Quality               

    stable          

                          2018                   None                

 

                          gastroesophageal reflux                   Onset and Re

solution                  

                    ongoing                   2018                   None 

               

 

                anxiety                   Quality                   acute       

            

2018                              None                

 

                anxiety                   Quality                   agitation   

                

2018                              None                

 

                    anxiety                   Onset and Resolution              

     ongoing        

                          2018                   None                

 

                arrhythmia                   Quality                   acute    

               

2018                              None                

 

                    arrhythmia                   Quality                   inter

mittent             

                          2018                   None                

 

                    arrhythmia                   Onset and Resolution           

        ongoing     

                          2018                   None                

 

                    gastroesophageal reflux                   Quality           

        acute       

                          2018                   None                

 

                    gastroesophageal reflux                   Quality           

        

regurgitation of acid                   2018                   None       

        

 

                          gastroesophageal reflux                   Onset and Re

solution                  

                    ongoing                   2018                   None 

               

 

                    gastroesophageal reflux                   Onset of Symptom  

                 

during adulthood                   2018                   None            

   

 

                    hypertension                   Onset and Resolution         

          ongoing   

                          2018                   None                

 

                    abdominal pain                   Location                   

in the epigastric 

area                      10/02/2018                   None                

 

                    abdominal pain                   Quality                   i

mproving            

                          10/02/2018                   None                

 

                    arrhythmia                   Quality                   irreg

ular beats          

                          10/02/2018                   told holter showed episod

e of atrial 

fibrillation so has to get a stress test done                

 

                    stress                   Exacerbating Factors               

    stressful life 

changes                   10/02/2018                   None                

 

                    anxiety                   Onset and Resolution              

     ongoing        

                          10/02/2018                   None                

 

                fatigue                   Quality                   chronic     

              

10/02/2018                              None                

 

                fatigue                   Quality                   constant    

               

10/02/2018                              None                

 

                    fatigue                   Onset and Resolution              

     ongoing        

                          10/02/2018                   None                

 

                    gastroesophageal reflux                   Quality           

        chronic     

                          2018                   None                

 

                          gastroesophageal reflux                   Onset and Re

solution                  

                    ongoing                   2018                   None 

               

 

                    abdominal pain                   Location                   

in the periumbilical

area                      2018                   None                

 

                    abdominal pain                   Location                   

in the epigastric 

area                      2018                   None                

 

                    abdominal pain                   Quality                   b

urning.             

                          2018                   Patient was in ER on  and was started 

on carafate 1gm QID                

 

                    muscle weakness                   Location                  

 diffusely          

                          2018                   None                

 

                    muscle weakness                   Quality                   

clumsiness          

                          2018                   None                

 

                    muscle weakness                   Quality                   

worsening           

                          2018                   None                

 

                    muscle weakness                   Quality                   

fatigue             

                          2018                   None                

 

                    hypertension                   Quality                   wor

sening              

                          2018                   None                

 

                    headache                   Location                   diffus

ely                 

                          2018                   None                

 

                    dizziness                   Quality                   lighth

eadedness           

                          2018                   None                

 

                    dizziness                   Quality                   [facility]

nce                 

                          2018                   None                

 

                    dizziness                   Onset and Resolution            

       ongoing      

                          2018                   None                

 

                    dizziness                   Onset of Symptom                

   1 1/2 months ago 

                          2018                   None                

 

                    muscle weakness                   Onset and Resolution      

             ongoing

                          2018                   None                

 

                    muscle weakness                   Onset of Symptom          

         1 1/2 

months ago                   2018                   None                

 

                    hypoglycemia                   Quality                   int

ermittent           

                          2018                   None                

 

                    neck pain                   Location                   in th

e posterior area    

                          2018                   None                

 

                    neck pain                   Quality                   improv

ing.                

                          2018                   Patient is still going to

 PT twice weekly and 

home exercises                

 

                    neck pain                   Location                   on th

e right             

                          2018                   None                

 

                    dyspnea                   Quality                   shortnes

s of breath         

                          2018                   None                

 

                    dyspnea                   Quality                   breathle

ssness              

                          2018                   None                

 

                    back pain                   Location                   in th

e right upper back 

area                      2018                   None                

 

                    back pain                   Onset and Resolution            

       ongoing      

                          2018                   None                

 

                back pain                   Quality                   dull      

             

2018                              None                

 

                    back pain                   Quality                   consta

nt                  

                          2018                   None                

 

                    back pain                   Quality                   discom

fort                

                          2018                   None                

 

                    muscle weakness                   Location                  

 diffusely          

                          2018                   None                

 

                    muscle weakness                   Quality                   

lower extremities   

                          2018                   None                

 

                    muscle weakness                   Quality                   

fatigue             

                          2018                   None                

 

                    muscle weakness                   Quality                   

clumsiness          

                          2018                   None                

 

                    muscle weakness                   Quality                   

worsening           

                          2018                   None                

 

                    otalgia                   Location                   on both

 sides              

                          2018                   None                

 

                otalgia                   Quality                   acute       

            

2018                              None                

 

                    otalgia                   Onset and Resolution              

     sudden in onset

                          2018                   None                

 

                    back pain                   Location                   in th

e left lower back 

area                      2018                   None                

 

                    back pain                   Quality                   discom

fort                

                          2018                   None                

 

                    back pain                   Radiating                   the 

inguinal area       

                          2018                   None                

 

                    back pain                   Radiating                   into

 left hip           

                          2018                   None                

 

                    back pain                   Onset of Symptom                

   1 weeks ago      

                          2018                   None                

 

                back pain                   Quality                   sharp     

              

2018                              None                

 

                    back pain                   Quality                   consta

nt                  

                          2018                   None                

 

                back pain                   Quality                   dull      

             

2017                              None                

 

                    back pain                   Quality                   improv

ing.                

                          2017                   Patient has finished PT a

nd continues to do home 

exercises                

 

                back pain                   Quality                   stable    

               

2017                              None                

 

                    back pain                   Location                   lumba

r spine             

                          2017                   None                

 

                    gait abnormality                   Quality                  

 unsteady           

                          2017                   None                

 

                    gait abnormality                   Quality                  

 improving          

                          2017                   None                

 

                    pelvic pain                   Location                   on 

the left            

                          2017                   None                

 

                pelvic pain                   Quality                   acute   

                

2017                              None                

 

                    pelvic pain                   Quality                   achi

ng                  

                          2017                   None                

 

                    pelvic pain                   Quality                   coli

cky                 

                          2017                   None                

 

                    pelvic pain                   Quality                   heav

iness               

                          2017                   None                

 

                    pelvic pain                   Onset and Resolution          

         gradual in 

onset                     2017                   None                

 

                    pelvic pain                   Onset of Symptom              

     2 months ago   

                          2017                   None                

 

                    hyperglycemia                   Quality                   gl

ucose intolerance   

                          2017                   None                

 

                    hyperglycemia                   Quality                   wo

rsening.            

                          2017                   Blood sugars have increas

ed into 110-120's   

            

 

                    hyperglycemia                   Onset of Symptom            

       2-3 months 

ago                       2017                   None                

 

                    breast complaint                   Location                 

  in the left       

                          2017                   None                

 

                    breast complaint                   Location                 

  in the right      

                          2017                   None                

 

                    breast complaint                   Quality                  

 tenderness         

                          2017                   None                

 

                    cough                   Location                   in the th

roat                

                          2017                   None                

 

                cough                   Quality                   acute         

          

2017                              None                

 

                cough                   Quality                   dry           

        

2017                              None                

 

                cough                   Quality                   hacking       

            

2017                              None                

 

                cough                   Quality                   tight         

          

2017                              None                

 

                    cough                   Onset of Symptom                   1

-2 days ago         

                          2017                   None                

 

                    otalgia                   Location                   on both

 sides              

                          2017                   None                

 

                otalgia                   Quality                   acute       

            

2017                              None                

 

                otalgia                   Quality                   throbbing   

                

2017                              None                

 

                    otalgia                   Onset and Resolution              

     sudden in onset

                          2017                   None                

 

                    otalgia                   Onset of Symptom                  

 3-5 days ago       

                          2017                   None                

 

                    chest tightness                   Location                  

 diffusely          

                          2017                   None                

 

                    chest tightness                   Quality                   

acute               

                          2017                   None                

 

                    chest tightness                   Quality                   

dull                

                          2017                   None                

 

                    chest tightness                   Quality                   

pleuritic           

                          2017                   None                

 

                    chest tightness                   Quality                   

piercing            

                          2017                   None                

 

                    chest tightness                   Onset and Resolution      

             sudden 

in onset                   2017                   None                

 

                    chest tightness                   Onset of Symptom          

         3-5 days 

ago                       2017                   None                

 

                cough                   Quality                   productive    

               

2017                              None                

 

                    nasal discharge                   Location                  

 in both  nares     

                          2017                   None                

 

                    nasal discharge                   Quality                   

acute               

                          2017                   None                

 

                    nasal discharge                   Quality                   

green               

                          2017                   None                

 

                    nasal discharge                   Quality                   

thick               

                          2017                   None                

 

                    nasal discharge                   Quality                   

worsening           

                          2017                   None                

 

                    nasal discharge                   Onset of Symptom          

         _ days ago 

                          2017                   None                

 

                    sinus congestion                   Quality                  

 acute              

                          2017                   None                

 

                    sinus congestion                   Quality                  

 fullness           

                          2017                   None                

 

                    sinus congestion                   Quality                  

 pain               

                          2017                   None                

 

                    sinus congestion                   Quality                  

 pressure           

                          2017                   None                

 

                    sinus congestion                   Onset of Symptom         

          _ days ago

                          2017                   None                

 

                    otalgia                   Location                   on the 

right               

                          2016                   None                

 

                otalgia                   Quality                   throbbing   

                

2016                              None                

 

                    otalgia                   Onset and Resolution              

     gradual in 

onset                     2016                   None                

 

                    otalgia                   Onset of Symptom                  

 1 weeks ago        

                          2016                   None                

 

                    otalgia                   Onset and Resolution              

     ongoing        

                          2016                   Has been using loratadine

 and flonase    

           

 

                otalgia                   Quality                   acute       

            

2016                              None                

 

                    otalgia                   Onset and Resolution              

     sudden in onset

                          2016                   None                

 

                    constipation                   Quality                   doc

ry 2-3 days         

                          2016                   None                

 

                    constipation                   Quality                   sma

ll stools           

                          2016                   None                

 

                constipation                   Quality                   hard   

                

2016                              None                

 

                    constipation                   Onset and Resolution         

          ongoing   

                          2016                   None                

 

                    constipation                   Alleviating Factors          

         medication.

                          2016                   Has tried various OTC chase

atments 

with very little releif                

 

                    constipation                   Alleviating Factors          

         diet 

changes                   2016                   None                

 

                    flank pain                   Location                   on b

oth sides           

                          2016                   None                

 

                    flank pain                   Quality                   stabb

ing                 

                          2016                   None                

 

                flank pain                   Quality                   sharp    

               

2016                              None                

 

                    flank pain                   Onset and Resolution           

        resolved    

                          2016                   Only had the one episode 

1 week ago  

             

 

                    back pain                   Location                   in th

e low back          

                          2016                   None                

 

                back pain                   Quality                   dull      

             

2016                              None                

 

                back pain                   Quality                   chronic   

                

2016                              None                

 

                    back pain                   Onset and Resolution            

       worse during 

the morning                   2016                   None                

 

                    hyperglycemia                   Quality                   gl

ucose intolerance   

                          2015                   None                

 

                    hyperglycemia                   Quality                   st

able                

                          2015                   None                

 

                    otalgia                   Location                   on both

 sides              

                          2015                   None                

 

                otalgia                   Quality                   dull        

           

2015                              None                

 

                    dizziness                   Quality                   interm

ittent              

                          2015                   None                

 

                    dizziness                   Quality                   lighth

eadedness           

                          2015                   None                

 

                    dizziness                   Quality                   imbala

nce                 

                          2015                   None                

 

                    hypertension                   Quality                   chr

onic                

                          2015                   None                

 

                    hypertension                   Quality                   sta

ble                 

                          2015                   None                

 

                otalgia                   Quality                   acute       

            

2015                              None                

 

                    otalgia                   Onset and Resolution              

     sudden in onset

                          2015                   None                

 

                    otalgia                   Onset and Resolution              

     ongoing        

                          2015                   None                

 

                    hypertension                   Onset and Resolution         

          ongoing   

                          2015                   None                

 

                    hypertension                   Onset of Symptom             

      during 

adulthood                   2015                   None                

 

                    hypertension                   Exacerbating Factors         

          stress    

                          2015                   None                

 

                    hypertension                   Alleviating Factors          

         medication 

                          2015                   None                

 

                    hyperglycemia                   Onset of Symptom            

       during 

adulthood                   2015                   None                

 

                otalgia                   Severity                   moderate   

                

2015                              None                

 

                    otalgia                   Triggers                   positio

n change            

                          2015                   None                

 

                    dizziness                   Onset and Resolution            

       ongoing      

                          2015                   None                

 

                    dizziness                   Quality                   rotati

on                  

                          2015                   None                

 

                    dizziness                   Quality                   loss o

f balance           

                          2015                   None                

 

                    dizziness                   Quality                   sense 

of room spinning    

                          2015                   None                

 

                dizziness                   Quality                   vertigo   

                

2015                              None                

 

                    dizziness                   Quality                   worsen

ing                 

                          2015                   None                

 

                    nasal allergies                   Location                  

 in both  nares     

                          2015                   None                

 

                    ataxia                   Quality                   feeling o

f unsteadiness with 

walking                   2015                   None                

 

                    ataxia                   Onset and Resolution               

    ongoing         

                          2015                   None                

 

                ataxia                   Quality                   worsening    

               

2015                              None                

 

                    ataxia                   Onset of Symptom                   

during adulthood    

                          2015                   None                

 

                    ataxia                   Limitation on Activities           

        necessitates

ambulation with a cane or walker                   2015                   

to make sure doesn't fall                

 

                    ataxia                   Frequency of Episodes              

     increasing     

                          2015                   None                

 

                ataxia                   Triggers                   activity    

               

2015                              has became more sedentary due to fear of

 falling   

            

 

                    muscle weakness                   Location                  

 diffusely          

                          2015                   None                

 

                    muscle weakness                   Quality                   

lower extremities   

                          2015                   None                

 

                    muscle weakness                   Quality                   

fatigue             

                          2015                   None                

 

                    muscle weakness                   Quality                   

worsening           

                          2015                   None                

 

                    muscle weakness                   Quality                   

clumsiness          

                          2015                   None                

 

                    muscle weakness                   Onset and Resolution      

             ongoing

                          2015                   None                

 

                fatigue                   Quality                   worsening   

                

2015                               passed away in July             

   

 

                    fatigue                   Quality                   low ener

gy                  

                          2015                   None                

 

                    muscle weakness                   Location                  

 diffusely          

                          2015                   None                

 

                    muscle weakness                   Quality                   

intermittent        

                          2015                   None                

 

                    muscle weakness                   Quality                   

fatigue             

                          2015                   None                

 

                    muscle weakness                   Onset and Resolution      

             ongoing

                          2015                   None                

 

                    otalgia                   Location                   on the 

right               

                          2015                   None                

 

                otalgia                   Quality                   acute       

            

2015                              None                

 

                otalgia                   Quality                   pressure    

               

2015                              None                

 

                    otalgia                   Quality                   uncomfor

table               

                          2015                   None                

 

                    otalgia                   Onset of Symptom                  

 2 weeks ago        

                          2015                   None                

 

                dizziness                   Quality                   acute     

              

2015                              None                

 

                    dizziness                   Quality                   feelin

gs of unsteadiness  

                          2015                   None                

 

                    dizziness                   Onset of Symptom                

   2 weeks ago      

                          2015                   None                

 

                    pain, limb                   Location                   on t

he right lower leg  

                          2015                   None                

 

                    pain, limb                   Quality                   inter

mittent             

                          2015                   None                

 

                    pain, limb                   Onset and Resolution           

        ongoing     

                          2015                   None                

 

                pain, limb                   Quality                   dull     

              

2015                              None                

 

                    pain, limb                   Onset of Symptom               

    2 weeks ago     

                          2015                   None                

 

                    pain, limb                   Quality                   worse

rhoda                

                          2015                   None                

 

                pain, limb                   Quality                   acute    

               

2015                              None                

 

                    pain, limb                   Pertinent Findings             

      Denies 

swelling                   2015                   None                

 

                    pain, limb                   Pertinent Findings             

      Denies warmth 

                          2015                   None                

 

                    pain, limb                   Pertinent Findings             

      muscle 

tenderness                   2015                   None                

 

                    pain, limb                   Pertinent Findings             

      Denies focal 

weakness                   2015                   None                

 

                    pain, limb                   Pertinent Findings             

      Denies 

erythema                   2015                   None                

 

                    chest pain/pressure                   Location              

     in the 

substernal area                   2015                   None             

  

 

                    chest pain/pressure                   Radiating             

      the back      

                          2015                   None                

 

                    chest pain/pressure                   Quality               

    improving       

                          2015                   None                

 

                    back pain                   Location                   thora

cic spine           

                          2015                   None                

 

                    back pain                   Quality                   improv

ing                 

                          2015                   None                

 

                    hyperglycemia                   Quality                   st

able                

                          2015                   Monitoring BS daily and r

anging 's         

      

 

                    hyperglycemia                   Quality                   pr

e-diabetic          

                          2015                   None                

 

                    hypertension                   Quality                   sta

ble                 

                          2015                   None                

 

                    hypertension                   Quality                   chr

onic                

                          2015                   None                

 

                    hyperlipidemia                   Quality                   s

table               

                          2015                   None                

 

                    sinusitis                   Location                   diffu

sely                

                          2014                   None                

 

                sinusitis                   Quality                   acute     

              

2014                              None                

 

                    sinusitis                   Quality                   fullne

ss                  

                          2014                   None                

 

                    sinusitis                   Onset of Symptom                

   2 days ago       

                          2014                   None                

 

                    cough                   Location                   in the th

roat                

                          2014                   None                

 

                cough                   Quality                   acute         

          

2014                              None                

 

                cough                   Quality                   dry           

        

2014                              None                

 

                cough                   Quality                   hacking       

            

2014                              None                

 

                    cough                   Onset of Symptom                   2

 weeks ago          

                          2014                   None                

 

                    otalgia                   Location                   on the 

right               

                          2014                   None                

 

                otalgia                   Quality                   acute       

            

2014                              None                

 

                otalgia                   Quality                   throbbing   

                

2014                              None                

 

                    otalgia                   Onset of Symptom                  

 1 week ago         

                          2014                   None                

 

                otalgia                   Quality                   pressure    

               

2014                              None                

 

                dizziness                   Quality                   acute     

              

2014                              None                

 

                    dizziness                   Quality                   feelin

gs of unsteadiness  

                          2014                   None                

 

                    dizziness                   Quality                   loss o

f balance           

                          2014                   None                

 

                    dizziness                   Onset of Symptom                

   1 week ago       

                          2014                   None                

 

                    muscle weakness                   Quality                   

lower extremities   

                          2014                   None                

 

                    muscle weakness                   Onset and Resolution      

             ongoing

                          2014                   Patient feels like it is 

related 

to amlodipine 2.5mg daily                

 

                    dizziness                   Quality                   sense 

of motion           

                          2014                   None                

 

                    dizziness                   Quality                   interm

ittent              

                          2014                   Inner ear issues---using 

flonase BID           

    

 

                cough                   Quality                   productive    

               

2014                              None                

 

                    back pain                   Location                   thora

cic spine           

                          2014                   None                

 

                back pain                   Quality                   aching    

               

2014                              None                

 

                sinusitis                   Quality                   pain      

             

2014                              None                

 

                    sinusitis                   Quality                   purule

nt                  

                          2014                   None                

 

                    sinusitis                   Quality                   fullne

ss                  

                          2014                   None                

 

                sinusitis                   Quality                   thick     

              

2014                              None                

 

                    sinusitis                   Quality                   pressu

re                  

                          2014                   None                

 

                    sinusitis                   Quality                   draina

ge                  

                          2014                   None                

 

                    sinusitis                   Onset and Resolution            

       ongoing      

                          2014                   Has been taking fluticaso

ne/claritin 

and also took leftover cefuroxime                

 

                    sinusitis                   Onset of Symptom                

   2 weeks ago      

                          2014                   None                

 

                    muscle weakness                   Quality                   

lower extremities   

                          2014                   None                

 

                    hypertension                   Onset and Resolution         

          ongoing   

                          2014                   None                

 

                    hypertension                   Onset of Symptom             

      during 

adulthood                   2014                   None                

 

                    hypertension                   Quality                   imp

roving              

                          2014                   None                

 

                    muscle weakness                   Location                  

 diffusely          

                          2014                   None                

 

                    muscle weakness                   Onset and Resolution      

             ongoing

                          2014                   None                

 

                    headache                   Location                   diffus

ely                 

                          2014                   None                

 

                headache                   Quality                   aching     

              

2014                              None                

 

                    headache                   Onset of Symptom                 

  3 days ago        

                          2014                   None                

 

                    cough                   Location                   in the th

roat                

                          2014                   None                

 

                cough                   Quality                   acute         

          

2014                              None                

 

                cough                   Quality                   productive    

               

2014                              Yellow sputum                

 

                    chest congestion                   Quality                  

 acute              

                          2014                   None                

 

                    chest congestion                   Quality                  

 painful            

                          2014                   None                

 

                    chest congestion                   Onset of Symptom         

          2 days ago

                          2014                   None                

 

                    sore throat                   Location                   dif

fusely              

                          2014                   None                

 

                    sore throat                   Quality                   achi

ng                  

                          2014                   None                

 

                    sore throat                   Onset of Symptom              

     2 days ago     

                          2014                   None                

 

                    cough                   Location                   in the th

roat                

                          10/15/2013                   None                

 

                cough                   Quality                   productive    

               

10/15/2013                              None                

 

                    chest congestion                   Quality                  

 thick/yellow 

secretions                   10/15/2013                   None                

 

                    chest congestion                   Onset of Symptom         

          2 days ago

                          10/15/2013                   Has been taking flonase a

nd 

claritin 1/ tab daily                

 

                    myalgias                   Location                   diffus

ely                 

                          10/15/2013                   None                

 

                    hypertension                   Onset of Symptom             

      3 days ago    

                          2013                   None                

 

                    hypertension                   Quality                   acu

te                  

                          2013                   None                

 

                    hypertension                   Blood Pressure Values        

           Stage 

2:-179 mmHg / -109 mmHg                   2013              

                                        None                

 

                    headache                   Location                   in the

 occipital area     

                          2013                   None                

 

                headache                   Quality                   aching     

              

2013                              None                

 

                    headache                   Onset of Symptom                 

  3 days ago        

                          2013                   None                

 

                    lower leg pain                   Location                   

on the right        

                          2013                   None                

 

                    headache                   Onset and Resolution             

      resolved      

                          2013                   after got shot in ER     

           

 

                anxiety                   Quality                   chronic     

              

2013                              None                

 

                    anxiety                   Onset and Resolution              

     ongoing        

                          2013                   None                

 

                    dizziness                   Quality                   sense 

of room spinning    

                          2013                   None                

 

                    otalgia                   Location                   on the 

right               

                          2013                   None                

 

                    sinus pain                   Quality                   press

ure                 

                          2013                   None                

 

                    dizziness                   Onset of Symptom                

   1 weeks ago      

                          2013                   None                

 

                    headache                   Location                   diffus

ely                 

                          2013                   None                

 

                headache                   Quality                   acute      

             

2013                              None                

 

                    sinus pain                   Location                   diff

usely               

                          2013                   None                

 

                sinus pain                   Quality                   acute    

               

2013                              None                

 

                    follow up                   Illness                   sympto

ms improved         

                          2013                   None                

 

                    otalgia                   Location                   on both

 sides              

                          2013                   None                

 

                otalgia                   Quality                   stable      

             

2013                              None                

 

                    otalgia                   Onset and Resolution              

     resolved       

                          2013                   wants rechecked to see if

 does have hole

in eardrum                

 

                    otalgia                   Onset of Symptom                  

 4 days ago         

                          05/10/2013                   None                

 

                    sore throat                   Location                   on 

the right           

                          05/10/2013                   None                

 

                cough                   Quality                   dry           

        

05/10/2013                              None                

 

                    otalgia                   Location                   on the 

right               

                          05/10/2013                   hurts into throat, has tr

ied ear drops, pain 7-8

on 10 scale                

 

                    breast complaint                   Location                 

  in the left upper 

inner quadrant                   2013                   None              

 

 

                    breast complaint                   Quality                  

 pain               

                          2013                   None                

 

                    breast complaint                   Onset of Symptom         

          1 days ago

                          2013                   Hasn't had mammogram in m

any 

years                

 

                    abdominal pain                   Location                   

in the epigastric 

area                      2012                   None                

 

                    dyspepsia                   Quality                   heartb

urn                 

                          2012                   None                

 

                    dyspepsia                   Quality                   improv

ing                 

                          2012                   None                

 

                    dizziness                   Quality                   sense 

of room spinning    

                          2012                   None                

 

                    dizziness                   Onset of Symptom                

   4 days ago       

                          2012                   None                

 

                    otalgia                   Location                   on both

 sides              

                          2012                   None                

 

                    knee pain                   Location                   on [place]

th knees            

                          2012                   None                

 

                    abdominal pain                   Quality                   i

mproving            

                          2012                   None                

 

                    abdominal pain                   Quality                   i

mproving            

                          10/23/2012                   None                

 

                    gas and bloating                   Quality                  

 improving          

                          10/23/2012                   None                

 

                    hypertension                   Quality                   sta

ble                 

                          10/23/2012                   None                

 

                    abdominal pain                   Location                   

in the epigastric 

area                      10/10/2012                   None                

 

                    abdominal pain                   Onset and Resolution       

            ongoing 

                          10/10/2012                   Has been taking omeprazol

e 20mg 

BID with no relief                

 

                    gas and bloating                   Location                 

  diffusely         

                          10/10/2012                   None                

 

                    gas and bloating                   Quality                  

 intermittent       

                          10/10/2012                   None                

 

                    diarrhea                   Quality                   intermi

ttent               

                          10/10/2012                   None                

 

                    nausea                   Quality                   intermitt

ent                 

                          10/10/2012                   None                

 

                    hypertension                   Quality                   sta

ble                 

                          10/10/2012                   None                

 

                    dizziness                   Quality                   lighth

eadedness/faint     

                          2012                   None                

 

                    dizziness                   Quality                   shakin

ess                 

                          2012                   None                

 

                dizziness                   Quality                   acute     

              

2012                              has had these episodes off and on past f

ew months  

             

 

                    dizziness                   Quality                   consta

nt                  

                          2012                   None                

 

                    dizziness                   Quality                   lighth

eadedness           

                          2012                   None                

 

                    dizziness                   Quality                   feelin

gs of unsteadiness  

                          2012                   None                

 

                    dizziness                   Onset and Resolution            

       sudden in 

onset                     2012                   None                

 

                    dizziness                   Onset of Symptom                

   1 days ago       

                          2012                   None                

 

                    dysuria                   Quality                   intermit

tent                

                          2012                   None                

 

                dysuria                   Quality                   aching      

             

2012                              None                

 

                    dysuria                   Onset and Resolution              

     sudden in onset

                          2012                   None                

 

                    dysuria                   Onset of Symptom                  

 2 days ago         

                          2012                   None                

 

                    muscle weakness                   Location                  

 diffusely          

                          2012                   None                

 

                    dizziness                   Quality                   lighth

eadedness/shakiness 

                          2012                   None                

 

                headache                   Quality                   dull       

            

2012                              None                

 

                    headache                   Location                   on the

 back side of head  

                          2012                   None                

 

                    headache                   Frequency of Episodes            

       daily        

                          2012                   None                

 

                nausea                   Quality                   acute        

           

2012                              had episode Friday while was with david proctor and he 

was having bloodwork and IV fluids done                

 

                dizziness                   Quality                   acute     

              

2012                              episode recently when was with boyfriend

 at 

hospital                

 

                    leg pain/sciatica                   Location                

   right leg 

sciatica                   2012                   None                

 

                    leg pain/sciatica                   Quality                 

  sharp pain        

                          2012                   None                

 

                    leg pain/sciatica                   Quality                 

  constant          

                          2012                   None                

 

                    leg pain/sciatica                   Onset and Resolution    

               

ongoing                   2012                   None                

 

                    leg pain/sciatica                   Onset of Symptom        

           2 weeks 

ago                       2012                   None                

 

                    leg pain/sciatica                   Limitation on Activities

                   

restricts weight bearing activity                   2012                  

                                        None                

 

                    leg pain/sciatica                   Limitation on Activities

                   

moderately limits activities                   2012                   None

               

 

                    hip pain                   Location                   on the

 right              

                          2012                   None                

 

                    hip pain                   Quality                   sharp p

ain                 

                          2012                   None                

 

                    hip pain                   Quality                   radiati

ng down leg         

                          2012                   None                

 

                    hip pain                   Onset and Resolution             

      sudden in 

onset                     2012                   None                

 

                    hip pain                   Onset of Symptom                 

  3 days ago        

                          2012                   None                

 

                    back pain                   Location                   in th

e upper back area   

                          2012                   None                

 

                back pain                   Quality                   sharp     

              

2012                              None                

 

                    back pain                   Quality                   pinchi

ng                  

                          2012                   None                

 

                    back pain                   Onset of Symptom                

   1 weeks ago      

                          2012                   None                

 

                    back pain                   Location                   lumba

r-sacral spine      

                          2012                   None                

 

                back pain                   Quality                   aching    

               

2012                              None                

 

                back pain                   Quality                   sharp     

              

2012                              None                

 

                    back pain                   Quality                   radiat

ing down right leg  

                          2012                   None                

 

                    back pain                   Onset of Symptom                

   1 weeks ago      

                          2012                   None                

 

                    pain, limb                   Location                   on t

he right leg        

                          2012                   None                

 

                    muscle weakness                   Quality                   

fatigue             

                          2012                   None                

 

                    muscle weakness                   Quality                   

shakey              

                          2012                   None                

 

                    muscle weakness                   Onset of Symptom          

         2 weeks ago

                          2012                   None                

 

                    arthralgia(s)                   Quality                   cr

amping              

                          2012                   None                

 

                    fatigue                   Onset and Resolution              

     ongoing        

                          2012                   None                

 

                fatigue                   Quality                   worsening   

                

2012                              None                

 

                    fatigue                   Quality                   intermit

tent                

                          2012                   None                

 

                    low blood pressure                   Onset and Resolution   

                

ongoing                   2012                   None                

 

                    arthralgia(s)                   Quality                   in

termittent          

                          2012                   None                

 

                    otalgia                   Location                   on both

 sides              

                          2011                   None                

 

                otalgia                   Quality                   constant    

               

2011                              None                

 

                otalgia                   Quality                   throbbing   

                

2011                              None                

 

                    otalgia                   Onset and Resolution              

     sudden in onset

                          2011                   None                

 

                    otalgia                   Onset of Symptom                  

 _ weeks ago        

                          2011                   None                

 

                otalgia                   Severity                   moderate   

                

2011                              None                

 

                    otalgia                   Frequency of Episodes             

      increasing    

                          2011                   None                

 

                    sore throat                   Location                   on 

both sides          

                          2011                   None                

 

                    sore throat                   Quality                   cons

tant                

                          2011                   None                

 

                    sore throat                   Quality                   achi

ng                  

                          2011                   None                

 

                    sore throat                   Quality                   scra

tchy                

                          2011                   None                

 

                    sore throat                   Onset and Resolution          

         sudden in 

onset                     2011                   None                

 

                    sore throat                   Onset of Symptom              

     _ weeks ago    

                          2011                   None                

 

                    headache                   Location                   in the

 occipital area     

                          2011                   None                

 

                headache                   Quality                   aching     

              

2011                              None                

 

                headache                   Quality                   constant   

                

2011                              None                

 

                    headache                   Quality                   throbbi

ng                  

                          2011                   None                

 

                    headache                   Onset and Resolution             

      sudden in 

onset                     2011                   None                

 

                    headache                   Onset of Symptom                 

  1 weeks ago       

                          2011                   None                

 

                    headache                   Limitation on Activities         

          moderately

limits activities                   2011                   None           

    

 

                    headache                   Location                   in the

 frontal area       

                          2011                   None                

 

                    back pain                   Location                   [facility]

cic spine           

                          2011                   None                

 

                    back pain                   Quality                   improv

ing with vimovo     

                          2011                   None                

 

                    abdominal pain                   Quality                   i

mproving            

                          2011                   None                

 

                    otalgia                   Location                   on the 

right               

                          2011                   None                

 

                    dizziness                   Quality                   sense 

of room spinning    

                          2011                   None                

 

                dizziness                   Quality                   vertigo   

                

2011                              None                

 

                    dizziness                   Quality                   interm

ittent              

                          2011                   None                

 

                    back pain                   Location                   [facility]

cic spine           

                          2011                   None                

 

                back pain                   Quality                   aching    

               

2011                              None                

 

                    otitis media                   Quality                   acu

te                  

                          2011                   None                

 

                dizziness                   Quality                   acute     

              

2011                              None                

 

                    abdominal pain                   Quality                   i

ntermittent         

                          2011                   None                

 

                    hip pain                   Location                   on the

 right              

                          2011                   None                

 

                    hip pain                   Quality                   improve

d very little from 

last week                   2011                   None                

 

                    hip pain                   Quality                   worsene

d with twisting     

                          2011                   None                

 

                    follow up                   Illness                   sympto

ms improved         

                          2011                   but still having problems

                

 

                hip pain                   Quality                   constant   

                

2011                              None                

 

                    hip pain                   Onset of Symptom                 

  8 hours ago       

                          2011                   noticed when woke up this

 morning       

        

 

                    hip pain                   Quality                   sharp p

ain                 

                          2011                   None                

 

                    hip pain                   Location                   on the

 right              

                          2011                   did walk on treadmill yes

terday with no shoes  

             

 

                    hip pain                   Location                   in the

 buttocks           

                          2011                   None                

 

                    hip pain                   Location                   in the

 groin              

                          2011                   None                

 

                    low blood pressure                   Quality                

   worsening        

                          2011                   None                

 

                    depression                   Onset and Resolution           

        ongoing     

                          2011                   thinks needs lexapro dose

 increased   

            

 

                fatigue                   Quality                   improving   

                

2010                              None                

 

                    muscle weakness                   Quality                   

improving           

                          2010                   None                

 

                    follow up                   Illness                   sympto

ms improved         

                          2010                   with Lexapro             

   

 

                    urinary urgency                   Onset and Resolution      

             ongoing

                          2010                   but improving with increa

sed 

water intake                

 

                    follow up                   Illness                   sympto

ms improved         

                          2010                   had weakness and shakes--

was busy week 

getting ready for granddaughter's wedding                

 

                    weakness                   Quality                   muscle 

weakness            

                          2010                   None                

 

                    dyskinesia or tremor                   Quality              

     acute          

                          2010                   came on out of blue      

          

 

                    follow up                   Illness                   sympto

ms remained 

unchanged                   2010                   still with fatigue, but

did have few days of increased energy after B12 shot                

 

                    constipation                   Quality                   imp

roving              

                          2010                   with stool softeners     

           

 

                    fatigue                   Onset and Resolution              

     ongoing        

                          2010                   None                

 

                    gastroesophageal reflux                   Quality           

        chronic     

                          2010                   with hiatal hernia and po

ssible early 

Shields's                

 

                    constipation                   Quality                   imp

roving              

                          2010                   with stool softener and p

robiotic--area in 

colon where couldn't get scope through                

 

                melena                   Quality                   black        

           

2010                              at times prior to scopes                

 

                fatigue                   Quality                   chronic     

              

2010                              on iron but not B12                

 

                    weakness                   Quality                   general

ized fatigue        

                          2010                   None                

 

                    weakness                   Quality                   decreas

ed energy           

                          2010                   None                

 

                    follow up                   Reason                   [facility]

s check             

                          2010                   Having trouble with gener

alized "shakiness" 

and overwhelming fatigue                

 

                    fatigue                   Onset of Symptom                  

 1 months ago       

                          2010                   None                

 

                fatigue                   Quality                   worsening   

                

2010                              None                

 

                    fatigue                   Limitation on Activities          

         moderately 

limits activities                   2010                   Able to 

maintain household, but has several days in a row where fatigue is overwhelming.
               

 

                    weakness                   Onset of Symptom                 

  1 months ago      

                          2010                   None                

 

                    weakness                   Frequency of Episodes            

       increasing   

                          2010                   None                

 

                    weakness                   Length of Episodes               

    2-3 days        

                          2010                   None                

 

                    weakness                   Limitation on Activities         

          moderately

limits activities                   2010                   None           

    

 

                    weakness                   Alleviating Factors              

     rest           

                          2010                   None                

 

                    weakness                   Quality                   muscle 

weakness            

                          2010                   Bilat leg/knee weakness a

nd feels 

shakey--xanax does help                

 

                fatigue                   Quality                   chronic     

              

2010                              None                

 

                    weakness                   Onset and Resolution             

      ongoing       

                          2010                   started after did 2days o

f extenive 

house cleaning                

 

                    disturbances of emotion                   Quality           

        chronic     

                          2010                   gets upset easily        

        



                                                                              



Advance Directives

          No Advance Directive data                                             
                      



Encounters

                      



                    Encounter                   Performer                   Loca

tion                

                          Codes                     Date                

 

                                                            ([id number]) OFFICE/OUTPA

TIENT VISIT EST

Diagnosis: Essential (primary) hypertension[ICD10: I10]

Diagnosis: Coronary atherosclerosis due to calcified coronary lesion[ICD10: 
I25.84]

Diagnosis: Hypoglycemia, unspecified[ICD10: E16.2]

Diagnosis: Other fatigue[ICD10: R53.83]

Diagnosis: Urinary tract infection, site not specified[ICD10: N39.0]            
                          Akanksha [facility] [employer]                   CPT-4: 49416                   06/10/2019      

         

 

                                                            ([id number]) OFFICE/OUTPA

TIENT VISIT EST

Diagnosis: Essential (primary) hypertension[ICD10: I10]

Diagnosis: Gastro-esophageal reflux disease without esophagitis[ICD10: K21.9]

Diagnosis: Urinary tract infection, site not specified[ICD10: N39.0]            
                          Akanksha BAUMAN [employer]                   CPT-4: 48713                   2019      

         

 

                                                            ([id number]) OFFICE/OUTPA

TIENT VISIT EST

Diagnosis: Gastro-esophageal reflux disease without esophagitis[ICD10: K21.9]

Diagnosis: Epigastric pain[ICD10: R10.13]                                     

Akanksha BAUMAN [facility]            

   

                          CPT-4: 69674                   2018             

   

 

                                                            ([id number]) OFFICE/OUTPA

TIENT VISIT EST

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

Diagnosis: Essential (primary) hypertension[ICD10: I10]

Diagnosis: Generalized anxiety disorder[ICD10: F41.1]

Diagnosis: Gastro-esophageal reflux disease without esophagitis[ICD10: K21.9]   
                                 Akankshazina [facility]IVER [employer]                   CPT-4: 49618                   2018   

            

 

                                                            OFFICE/OUTPATIENT VI

SIT EST

Diagnosis: Gastro-esophageal reflux disease without esophagitis[ICD10: K21.9]

Diagnosis: Palpitations[ICD10: R00.2]

Diagnosis: Urinary tract infection, site not specified[ICD10: N39.0]

Diagnosis: Generalized anxiety disorder[ICD10: F41.1]                           
                          Akanksha [facility]      

                          CPT-4: 17547                   10/02/2018             

   

 

                                                            ([id number]) NURSE/OUTPAT

IENT VISIT EST

Diagnosis: Coronary atherosclerosis due to calcified coronary lesion[ICD10: 
I25.84]

Diagnosis: Hypoglycemia, unspecified[ICD10: E16.2]

Diagnosis: Dizziness and giddiness[ICD10: R42]

Diagnosis: Occlusion and stenosis of bilateral carotid arteries[ICD10: I65.23]  
                                  Akanksha DRIVER [facility]                   CPT-4: 71999                   

2018                

 

                                                            OFFICE/OUTPATIENT VI

SIT EST

Diagnosis: Epigastric pain[ICD10: R10.13]

Diagnosis: Generalized anxiety disorder[ICD10: F41.1]

Diagnosis: FLU VACCINE[ICD10: Z23]                                     

Akanksha [facility]            

   

                          CPT-4: 19723                   2018             

   

 

                                                            ([id number]) OFFICE/OUTPA

TIENT VISIT EST

Diagnosis: Essential (primary) hypertension[ICD10: I10]

Diagnosis: Hypoglycemia, unspecified[ICD10: E16.2]

Diagnosis: Gastro-esophageal reflux disease without esophagitis[ICD10: K21.9]   
                                 Akanksha [facility]                   CPT-4: 31137                   2018   

            

 

                                                            ([id number]) OFFICE/OUTPA

TIENT VISIT EST

Diagnosis: Dizziness and giddiness[ICD10: R42]                                  
                          [facility]               

                          CPT-4: 15659                   2018             

   

 

                                                            ([id number]) OFFICE/OUTPA

TIENT VISIT EST

Diagnosis: Cervicalgia[ICD10: M54.2]

Diagnosis: Other spondylosis with radiculopathy, cervical region[ICD10: M47.22] 
                                   Akanksha [facility]                   CPT-4: 96040                   

2018                

 

                                                            ([id number]) OFFICE/OUTPA

TIENT VISIT EST

Diagnosis: Hypoglycemia, unspecified[ICD10: E16.2]

Diagnosis: Other spondylosis with radiculopathy, cervical region[ICD10: M47.22]

Diagnosis: Vertigo of central origin, bilateral[ICD10: H81.43]

Diagnosis: Cervicocranial syndrome[ICD10: M53.0]                                
                          Akanksha [facility]           

                          CPT-4: 50084                   2018             

   

 

                                                            ([id number]) OFFICE/OUTPA

TIENT VISIT EST

Diagnosis: Benign paroxysmal vertigo, bilateral[ICD10: H81.13]

Diagnosis: Otalgia, bilateral[ICD10: H92.03]                                    
                          [facility]                 

                          CPT-4: 18562                   2018             

   

 

                                                            ([id number]) OFFICE/OUTPA

TIENT VISIT EST

Diagnosis: Low back pain[ICD10: M54.5]

Diagnosis: Radiculopathy, lumbosacral region[ICD10: M54.17]

Diagnosis: Left lower quadrant pain[ICD10: R10.32]                              
                          Akanksha MEJ[facility]         

                          CPT-4: 82616                   2018             

   

 

                                                            ([id number]) OFFICE/OUTPA

TIENT VISIT EST

Diagnosis: FLU VACCINE[ICD10: Z23]                                     

Akanksha [facility]            

   

                          CPT-4: 66476                   10/06/2017             

   

 

                                                            ([id number]) OFFICE/OUTPA

TIENT VISIT EST

Diagnosis: Mixed hyperlipidemia[ICD10: E78.2]

Diagnosis: Essential (primary) hypertension[ICD10: I10]

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

Diagnosis: Impaired fasting glucose[ICD10: R73.01]

Diagnosis: Other fatigue[ICD10: R53.83]                                     

Akanksha [facility]            

   

                          CPT-4: 17031                   2017             

   

 

                                                            ([id number]) OFFICE/OUTPA

TIENT VISIT EST

Diagnosis: Pain in thoracic spine[ICD10: M54.6]

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 
                                   Akanksha DRIVER [facility]                   CPT-4: 17309                   

2017                

 

                                                            ([id number]) OFFICE/OUTPA

TIENT VISIT EST

Diagnosis: Left lower quadrant pain[ICD10: R10.32]

Diagnosis: Low back pain[ICD10: M54.5]                                     

Akanksha DRIVER [facility]            

   

                          CPT-4: 95098                   2017             

   

 

                                                            ([id number]) OFFICE/OUTPA

TIENT VISIT EST

Diagnosis: Mixed hyperlipidemia[ICD10: E78.2]

Diagnosis: Essential (primary) hypertension[ICD10: I10]

Diagnosis: Hypoglycemia, unspecified[ICD10: E16.2]                              
                          Akanksha MEJIA [facility]         

                          CPT-4: 93325                   2017             

   

 

                                                            ([id number]) OFFICE/OUTPA

TIENT VISIT EST

Diagnosis: Impaired fasting glucose[ICD10: R73.01]

Diagnosis: Mastodynia[ICD10: N64.4]

Diagnosis: Mixed hyperlipidemia[ICD10: E78.2]

Diagnosis: Essential (primary) hypertension[ICD10: I10]

Diagnosis: Other fatigue[ICD10: R53.83]                                     

Akanksha CHEATHAMAramis [facility]            

   

                          CPT-4: 97826                   2017             

   

 

                                                            ([id number]) OFFICE/OUTPA

TIENT VISIT EST

Diagnosis: Acute upper respiratory infection, unspecified[ICD10: J06.9]         
                           Luba Graham HIGHTOWERLINE SAramis 

[facility]                   CPT-4: 80171                   2017      

         

 

                                                            (73913) OFFICE/OUTPA

TIENT VISIT EST

Diagnosis: Acute mastoiditis without complications, right ear[ICD10: H70.001]   
                                 Akanksha HIGHTOWERLINE

SAramis [facility]                   CPT-4: 72509                   2016   

            

 

                                                            ([id number]) OFFICE/OUTPA

TIENT VISIT EST

Diagnosis: FLU VACCINE[ICD10: Z23]                                     

Akanksha DRIVER [facility]            

   

                          CPT-4: 50940                   10/07/2016             

   

 

                                                            ([id number]) OFFICE/OUTPA

TIENT VISIT EST

Diagnosis: Mixed hyperlipidemia[ICD10: E78.2]

Diagnosis: Essential (primary) hypertension[ICD10: I10]

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

Diagnosis: Impaired fasting glucose[ICD10: R73.01]                              
                          Akankshazina MEJIA [facility]         

                          CPT-4: 24169                   2016             

   

 

                                                            (44783) OFFICE/OUTPA

TIENT VISIT EST

Diagnosis: Constipation, unspecified[ICD10: K59.00]                             
                          Akanksha MEJIA [facility]        

                          CPT-4: 44884                   2016             

   

 

                                                            (52366) OFFICE/OUTPA

TIENT VISIT EST

Diagnosis: Essential (primary) hypertension[ICD10: I10]

Diagnosis: Mixed hyperlipidemia[ICD10: E78.2]

Diagnosis: Chronic kidney disease, stage 1[ICD10: N18.1]                        
                          Akanksha MEJIA [facility]   

                          CPT-4: 25611                   2016             

   

 

                                                            (45251) OFFICE/OUTPA

TIENT VISIT EST

Diagnosis: Muscle weakness (generalized)[ICD10: M62.81]

Diagnosis: Dizziness and giddiness[ICD10: R42]

Diagnosis: Essential (primary) hypertension[ICD10: I10]

Diagnosis: History of falling[ICD10: Z91.81]                                    
                          [facility]               

                          CPT-4: 12954                   2015             

   

 

                                                            ([id number]) OFFICE/OUTPA

TIENT VISIT EST

Diagnosis: FLU VACCINE[ICD10: Z23]                                     

Akanksha DRIVER [facility]            

   

                          CPT-4: 70897                   10/16/2015             

   

 

                                                            (51662) OFFICE/OUTPA

TIENT VISIT EST

Diagnosis: Acute renal failure[ICD9: 584.9]

Diagnosis: POLYURIA[ICD9: 788.42]                                     Akanksha DRIVER [facility]                   CPT-4: 

85681                                   09/15/2015                

 

                                                            ([id number]) OFFICE/OUTPA

TIENT VISIT EST

Diagnosis: HYPERLIPIDEMIA NEC/NOS[ICD9: 272.4]

Diagnosis: HYPERTENSION[ICD9: 401.9]

Diagnosis: CAD[ICD9: 414.00]

Diagnosis: Hyperglycemia[ICD9: 790.29]

Diagnosis: MALAISE AND FATIGUE[ICD9: 780.79]                                    
                          [facility]               

                          CPT-4: 58397                   09/10/2015             

   

 

                                                            (47773) OFFICE/OUTPA

TIENT VISIT EST

Diagnosis: MALAISE AND FATIGUE[ICD9: 780.79]

Diagnosis: HYPOGLYCEMIA[ICD9: 251.2]

Diagnosis: Grieving[ICD9: 309.0]

Diagnosis: ABDOMINAL PAIN[ICD9: 789.00]                                     

Akanksha DRIVER [facility]            

   

                          CPT-4: 01132                   2015             

   

 

                                                            OFFICE/OUTPATIENT VI

SIT EST

Diagnosis: CERUMEN IMPACTION[ICD9: 380.4]

Diagnosis: EUSTACHIAN TUBE DYSFUNCTION[ICD9: 381.81]                            
                          Bertha Elieser                   AKANKSHA S. O

RENDER DO [facility]     

                          CPT-4: 93544                   2015             

   

 

                                                            (57262) OFFICE/OUTPA

TIENT VISIT EST

Diagnosis: Leg pain[ICD9: 729.5]

Diagnosis: SUPERFIC PHLEBITIS-LEG[ICD9: 451.0]                                  
                          Akanksha MEJIA [facility]             

                          CPT-4: 27397                   2015             

   

 

                                                            (21712) OFFICE/OUTPA

TIENT VISIT EST

Diagnosis: Thoracic back pain[ICD9: 724.1]

Diagnosis: SPASM OF MUSCLE[ICD9: 728.85]                                     

Akanksha DRIVER [facility]            

   

                          CPT-4: 00569                   2015             

   

 

                                                            (24704) OFFICE/OUTPA

TIENT VISIT EST

Diagnosis: DIZZINESS/VERTIGO[ICD9: 780.4]

Diagnosis: Benign positional vertigo[ICD9: 386.11]

Diagnosis: HYPERTENSION[ICD9: 401.9]

Diagnosis: Suspicious nevus[ICD9: 238.2]                                     

Akanksha DRIVER [facility]            

   

                          CPT-4: 39028                   2015             

   

 

                                                            (24150) OFFICE/OUTPA

TIENT VISIT EST

Diagnosis: FLU VACCINE[ICD10: Z23]                                     

Akanksha DRIVER [facility]            

   

                          CPT-4: 07857                   10/17/2014             

   

 

                                                            OFFICE/OUTPATIENT VI

SIT EST

Diagnosis: SINUSITIS, ACUTE[ICD9: 461.9]

Diagnosis: EUSTACHIAN TUBE DYSFUNCTION[ICD9: 381.81]                            
                          Bertha FuentesJulio                   AKANKSHA S. O

RENDER DO [facility]     

                          CPT-4: 58210                   2014             

   

 

                                                            (53376) OFFICE/OUTPA

TIENT VISIT EST

Diagnosis: DIZZINESS/VERTIGO[ICD9: 780.4]

Diagnosis: HYPERTENSION[ICD9: 401.9]                                     

Akanksha DRIVER [facility]            

   

                          CPT-4: 15177                   2014             

   

 

                                                            (59623) OFFICE/OUTPA

TIENT VISIT EST

Diagnosis: HYPERTENSION[ICD9: 401.9]

Diagnosis: MALAISE AND FATIGUE[ICD9: 780.79]

Diagnosis: SINUSITIS, ACUTE[ICD9: 461.9]                                     

Akanksha DRIVER [facility]            

   

                          CPT-4: 31113                   2014             

   

 

                                                            (96421) OFFICE/OUTPA

TIENT VISIT EST

Diagnosis: HYPERLIPIDEMIA NEC/NOS[ICD9: 272.4]

Diagnosis: HYPERTENSION[ICD9: 401.9]

Diagnosis: B12 DEFIC ANEMIA NEC[ICD9: 281.1]

Diagnosis: HYPOGLYCEMIA[ICD9: 251.2]                                     

Akanksha DRIVER [facility]            

   

                          CPT-4: 87028                   2014             

   

 

                                                            OFFICE/OUTPATIENT VI

SIT EST

Diagnosis: COUGH[ICD9: 786.2]                                     Bertha DRIVER [facility]                   

CPT-4: 95885                            2014                

 

                                                            OFFICE/OUTPATIENT VI

SIT EST

Diagnosis: COUGH[ICD9: 786.2]

Diagnosis: URI, ACUTE[ICD9: 465.9]                                     Bertha DRIVER [facility]                   

CPT-4: 72076                            10/15/2013                

 

                                                            (34446) OFFICE/OUTPA

TIENT VISIT EST

Diagnosis: HYPERTENSION[ICD9: 401.9]

Diagnosis: CEPHALGIA[ICD9: 784.0]

Diagnosis: ANXIETY STATE NOS[ICD9: 300.00]

Diagnosis: FLU VACCINE[ICD9: V04.81]                                     

Akanksha DRIVER [facility]            

   

                          CPT-4: 86601                   2013             

   

 

                                                            (88212) OFFICE/OUTPA

TIENT VISIT EST

Diagnosis: DIZZINESS/VERTIGO[ICD9: 780.4]

Diagnosis: SINUSITIS, ACUTE[ICD9: 461.9]

Diagnosis: Benign positional vertigo[ICD9: 386.11]                              
                          Akanksha MEJIA [facility]         

                          CPT-4: 47350                   2013             

   

 

                                                            OFFICE/OUTPATIENT VI

SIT EST

Diagnosis: OTITIS MEDIA NOS[ICD9: 382.9]                                     

Akanksha DRIVER [facility]            

   

                          CPT-4: 11882                   2013             

   

 

                                                            OFFICE/OUTPATIENT VI

SIT EST

Diagnosis: Perforation of ear drum[ICD9: 384.20]

Diagnosis: SINUSITIS, ACUTE[ICD9: 461.9]                                     

Akanksha DRIVER [facility]            

   

                          CPT-4: 94708                   05/10/2013             

   

 

                                                            (51226) OFFICE/OUTPA

TIENT VISIT EST

Diagnosis: Mastalgia[ICD9: 611.71]                                     

Akanksha DRIVER [facility]            

   

                          CPT-4: 85363                   2013             

   

 

                                                            (85212) OFFICE/OUTPA

TIENT VISIT EST

Diagnosis: HYPERLIPIDEMIA NEC/NOS[ICD9: 272.4]

Diagnosis: HYPERTENSION[ICD9: 401.9]

Diagnosis: DIZZINESS/VERTIGO[ICD9: 780.4]

Diagnosis: Hyperglycemia[ICD9: 790.29]

Diagnosis: MALAISE AND FATIGUE[ICD9: 780.79]                                    
                          Akanksha MEJIA [facility]               

                          CPT-4: 15177                   2012             

   

 

                                                            (83103) OFFICE/OUTPA

TIENT VISIT EST

Diagnosis: EUSTACHIAN TUBE DYSFUNCTION[ICD9: 381.81]

Diagnosis: DIZZINESS/VERTIGO[ICD9: 780.4]

Diagnosis: ABDOMINAL PAIN[ICD9: 789.00]

Diagnosis: GERD[ICD9: 530.81]

Diagnosis: CERUMEN IMPACTION[ICD9: 380.4]                                     

Akanksha DRIVER [facility]            

   

                          CPT-4: 22189                   2012             

   

 

                                                            OFFICE/OUTPATIENT VI

SIT EST

Diagnosis: ABDOMINAL PAIN[ICD9: 789.00]

Diagnosis: DYSPEPSIA[ICD9: 536.8]                                     Akanksha DRIVER [facility]                   CPT-4: 

83324                                   10/23/2012                

 

                                                            (97773) OFFICE/OUTPA

TIENT VISIT EST

Diagnosis: ABDOMINAL PAIN[ICD9: 789.00]

Diagnosis: DYSPEPSIA[ICD9: 536.8]

Diagnosis: IBS[ICD9: 564.1]                                     Akanksha DRIVER DO [facility]                   CPT-4: 

27176                                   10/10/2012                

 

                                                            OFFICE/OUTPATIENT VI

SIT EST

Diagnosis: DIZZINESS/VERTIGO[ICD9: 780.4]

Diagnosis: HYPOGLYCEMIA[ICD9: 251.2]                                     

Akanksha DRIVER DO [facility]            

   

                          CPT-4: 66774                   2012             

   

 

                                                            (55937) OFFICE/OUTPA

TIENT VISIT EST

Diagnosis: URINARY TRACT INFECTION[ICD9: 599.0]                                 
                          Akanksha MEJIA DO [facility]            

                          CPT-4: 45446                   2012             

   

 

                                                            (50316) OFFICE/OUTPA

TIENT VISIT EST

Diagnosis: HYPERLIPIDEMIA NEC/NOS[ICD9: 272.4]

Diagnosis: HYPERTENSION[ICD9: 401.9]

Diagnosis: MALAISE AND FATIGUE[ICD9: 780.79]

Diagnosis: DIZZINESS/VERTIGO[ICD9: 780.4]                                     

Akanksha DRIVER DO [facility]            

   

                          CPT-4: 56829                   2012             

   

 

                                                            (29094) OFFICE/OUTPA

TIENT VISIT EST

Diagnosis: DIZZINESS/VERTIGO[ICD9: 780.4]

Diagnosis: MALAISE AND FATIGUE[ICD9: 780.79]

Diagnosis: HYPERTENSION[ICD9: 401.9]                                     

Akanksha DRIVER DO [facility]            

   

                          CPT-4: 83942                   2012             

   

 

                                                            OFFICE/OUTPATIENT VI

SIT EST

Diagnosis: LUMB/LUMBOSAC DISC DEGEN[ICD9: 722.52]

Diagnosis: Radiculopathy of leg[ICD9: 724.4]

Diagnosis: SACROILIITIS NEC[ICD9: 720.2]

Diagnosis: SPINAL ENTHESOPATHY[ICD9: 720.1]                                     

Akanksha DRIVER DO [facility]            

   

                          CPT-4: 19847                   2012             

   

 

                                                            (53770) OFFICE/OUTPA

TIENT VISIT EST

Diagnosis: PAIN, LOWER BACK[ICD9: 724.2]

Diagnosis: SPASM OF MUSCLE[ICD9: 728.85]

Diagnosis: SCIATICA[ICD9: 724.3]

Diagnosis: Lumbar degenerative disc disease[ICD9: 722.52]                       
                          Akanksha MCKEON

NDER [facility]  

                          CPT-4: 42283                   2012             

   

 

                                                            (64528) OFFICE/OUTPA

TIENT VISIT EST

Diagnosis: PAIN IN THORACIC SPINE[ICD9: 724.1]

Diagnosis: SPASM OF MUSCLE[ICD9: 728.85]                                     

Akanksha DRIVER [facility]            

   

                          CPT-4: 29876                   2012             

   

 

                                                            (25687) OFFICE/OUTPA

TIENT VISIT EST

Diagnosis: PAIN, LOWER BACK[ICD9: 724.2]

Diagnosis: SPASM OF MUSCLE[ICD9: 728.85]

Diagnosis: Sacroiliac dysfunction[ICD9: 739.4]

Diagnosis: Lumbar degenerative disc disease[ICD9: 722.52]                       
                          Akanksha MEJIA [facility]  

                          CPT-4: 50230                   2012             

   

 

                                                            OFFICE/OUTPATIENT VI

SIT EST

Diagnosis: MALAISE AND FATIGUE[ICD9: 780.79]

Diagnosis: HYPOTENSION[ICD9: 458.9]

Diagnosis: CAD[ICD9: 414.00]                                     Akanksha DRIVER [facility]                   CPT-4: 

83177                                   2012                

 

                                                            OFFICE/OUTPATIENT VI

SIT EST

Diagnosis: SINUSITIS, ACUTE[ICD9: 461.9]

Diagnosis: PHARYNGITIS, ACUTE[ICD9: 462]

Diagnosis: CERUMEN IMPACTION[ICD9: 380.4]                                     

Akanksha DRIVER [facility]            

   

                          CPT-4: 52437                   2011             

   

 

                                                            OFFICE/OUTPATIENT VI

SIT EST

Diagnosis: PAIN IN THORACIC SPINE[ICD9: 724.1]

Diagnosis: GERD[ICD9: 530.81]

Diagnosis: DIZZINESS/VERTIGO[ICD9: 780.4]                                     

Akanksha DRIVER [facility]            

   

                          CPT-4: 12033                   2011             

   

 

                                                            OFFICE/OUTPATIENT VI

SIT EST                                 

                          Akanksha MEJIA [facility]            

                          CPT-4: 85027                   2011             

   

 

                                                            (59842) OFFICE/OUTPA

TIENT VISIT EST                         

                          Akanksha MEJIA [facility]    

                          CPT-4: 01642                   2011             

   

 

                                                            (91280) OFFICE/OUTPA

TIENT VISIT, EST

Diagnosis: PAIN, LOWER BACK[ICD9: 724.2]                                     

[facility]            

   

                          CPT-4: 85957                   2011             

   

 

                                                            (94758) OFFICE/OUTPA

TIENT VISIT, EST                        

                          Akanksha BAUMAN S. ORE

[facility]   

                          CPT-4: 61853                   2011             

   

 

                                                            (48822) OFFICE/OUTPA

TIENT VISIT, EST                        

                          Akanksha HIGHTOWERLINE S. ORE

[facility]   

                          CPT-4: 05279                   2010             

   

 

                                                            (53301) OFFICE/OUTPA

TIENT VISIT, EST                        

                          Akanksha BAUMAN S. ORE

[facility]   

                          CPT-4: 93789                   2010             

   

 

                                                            (86839) OFFICE/OUTPA

TIENT VISIT, EST                        

                          Akanksha BAUMAN S. ORE

[facility]   

                          CPT-4: 56084                   2010             

   

 

                                                            (62385) OFFICE/OUTPA

TIENT VISIT, EST                        

                          Akanksha BAUMAN S. ORE

[facility]   

                          CPT-4: 82153                   2010             

   

 

                                                            (55132) OFFICE/OUTPA

TIENT VISIT, EST                        

                          Akanksha BAUMAN S. ORE

[facility]   

                          CPT-4: 97145                   2010             

   

 

                                                            (34280) OFFICE/OUTPA

TIENT VISIT, EST                        

                          Akanksha BAUMAN S. ORE

[facility]   

                          CPT-4: 88639                   2010             

   



                                                                                
                                                                                
                                                                                
                                                                                
                                                                                
                                                                                
                                                                                
                                                                                
                                                                                
                                                                                
                  



Plan of Care

                      



                    Planned Activity                   Notes                   C

odes                

                          Status                    Date                

 

                          Visit Diagnosis Plan: Essential (primary) hypertension

                   

Discussion: Stable Check CMP

                                        ICD-9 : 401.9

ICD-10 : I10

                                                    06/10/2019                

 

                          Visit Diagnosis Plan: Hypoglycemia, unspecified       

            Discussion: 

Monitor BS At least 6 small meals a day each with protein

                                        ICD-9 : 251.2

ICD-10 : E16.2

                                                    06/10/2019                

 

                          Visit Diagnosis Plan: Other fatigue                   

Discussion: Check CBC, TSH

                                        ICD-9 : 780.79

ICD-10 : R53.83

                                                    06/10/2019                

 

                                        Visit Diagnosis Plan: Urinary tract infe

ction, site not specified               

                                        Discussion: Started an old abx prescript

ion

                                        ICD-9 : 599.0

ICD-10 : N39.0

                                                    06/10/2019                

 

                                        Appointment: Akanksha Driver

WPtel:[phone]

                                        [address] [place], [place] [id number]

[place]                                                           FOLLOW UP          

 

                                        06/10/2019                

 

                                        Visit Diagnosis Plan: Urinary tract infe

ction, site not specified               

                                        Discussion: Macrobid 100mg po BID for 1 

week

                                        ICD-9 : 599.0

ICD-10 : N39.0

                                                    2019                

 

                                        Visit Diagnosis Plan: Gastro-esophageal 

reflux disease without esophagitis      

                                        Discussion: Stable on omeprazole

Follow Up: 3 months

                                        ICD-9 : 530.81

ICD-10 : K21.9

                                                    2019                

 

                          Visit Diagnosis Plan: Essential (primary) hypertension

                   

Discussion: Stable Sees Dr. Clements this month

                                        ICD-9 : 401.9

ICD-10 : I10

                                                    2019                

 

                                        Appointment: Akanksha Driver

WPtel:[phone]

                                        [address] [place], [place] [id number]

[place]                                                           FOLLOW UP          

 

                                        2019                

 

                                        Patient Education: metoprolol tartrate- 

[employer] Coupon [id number]              

                                        

[email or web address]/[email or web address]

[email or web address]                                             Completed       

      

                                        2019                

 

                                        Appointment: Akanksha Driver

WPtel:[phone]

                                        [address] [place], [place] [address]

[place]                                                           CANCELED           

 

                                        02/15/2019                

 

                          Visit Diagnosis Plan: Epigastric pain                 

  Discussion: Check CT 

abdomen/pelvis due to ongoing abdominal pain

                                        ICD-9 : 789.06

ICD-10 : R10.13

                                                    2018                

 

                                        Visit Diagnosis Plan: Gastro-esophageal 

reflux disease without esophagitis      

                                        Discussion: Continue protonix and carafa

te Proceed with updated EGD

                                        ICD-9 : 530.81

ICD-10 : K21.9

                                                    2018                

 

                                        Appointment: Akanksha Driver

WPtel:[phone]

                                        [address] [place], [place] [address]

[place]                                                           FOLLOW UP          

 

                                        2018                

 

                    Care Plan: CT PELVIS W/O DYE                                

       LOINC : 

42418-2

                          Pending                   2018                

 

                    Care Plan: CT ABDOMEN W/O DYE                               

        LOINC : 

92430-2

                          Pending                   2018                

 

                    Care Plan: Referral Order                                   

    SNOMED-CT : 

[id number]

                          Pending                   2018                

 

                          Visit Diagnosis Plan: Generalized anxiety disorder    

               Discussion:

Stable

                                        ICD-9 : 300.00

ICD-10 : F41.1

                                                    2018                

 

                                        Visit Diagnosis Plan: Gastro-esophageal 

reflux disease without esophagitis      

                                        Discussion: Continue protonix at BID dos

ing and carafate BID

Follow Up: 2 months

                                        ICD-9 : 530.81

ICD-10 : K21.9

                                                    2018                

 

                          Visit Diagnosis Plan: Essential (primary) hypertension

                   

Discussion: Stable

                                        ICD-9 : 401.9

ICD-10 : I10

                                                    2018                

 

                                        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

art disease of native coronary artery 

without angina pectoris                   Discussion: S/P cardiac cath--doing 

medical management with imdur and metoprolol increased Has fwup with cardiology 
next week Discussed cardiac rehab

                                        ICD-9 : 414.00

ICD-10 : I25.10

                                                    2018                

 

                                        Appointment: Akanksha Driver

WPtel:[phone] [address] [facility]

[place]                                                           FOLLOW UP          

 

                                        2018                

 

                                        Visit Diagnosis Plan: Gastro-esophageal 

reflux disease without esophagitis      

                                        Discussion: Continue protonix at BID dos

ing Continue carafate at q 

AC and HS dosing for 2 more weeks then decrease to BID dosing

Follow Up: 4 weeks

                                        ICD-9 : 530.81

ICD-10 : K21.9

                                                    10/02/2018                

 

                          Visit Diagnosis Plan: Generalized anxiety disorder    

               Discussion:

Increase xanax to BID dosing especially with upcoming cardiac testing which is 
already making patient more anxious and worried

                                        ICD-9 : 300.00

ICD-10 : F41.1

                                                    10/02/2018                

 

                          Visit Diagnosis Plan: Palpitations                   D

iscussion: Cardilology 

going to schedule Lexiscan

                                        ICD-9 : 785.1

ICD-10 : R00.2

                                                    10/02/2018                

 

                                        Visit Diagnosis Plan: Urinary tract infe

ction, site not specified               

                                        Discussion: Reculture urine

                                        ICD-9 : 599.0

ICD-10 : N39.0

                                                    10/02/2018                

 

                                        Appointment: Akanksha Driver

WPtel:[phone] [address] [facility]

[place]                                                           FOLLOW UP          

 

                                        10/02/2018                

 

                          Patient Education: Patient Medication Summary         

                          

                                        Completed                   10/02/2018  

              

 

                                        Appointment: Akanksha Driver

WPtel:[phone] [address] [facility] [address]

                                                           LAB                

 

                                        2018                

 

                          Patient Education: Patient Medication Summary         

                          

                                        Completed                   2018  

              

 

                          Visit Diagnosis Plan: Epigastric pain                 

  Discussion: Continue 

protonix and carafate but make into a slurry Flu shot given Discussed EGD if 
symptoms persist

Follow Up: 2 weeks

                                        ICD-9 : 789.06

ICD-10 : R10.13

                                                    2018                

 

                          Visit Diagnosis Plan: Generalized anxiety disorder    

               Discussion:

Did discuss increased risk of benzodiazepines causing dementia but at this time 
will stay at xanax 0.25mg po BID

                                        ICD-9 : 300.00

ICD-10 : F41.1

                                                    2018                

 

                                        Appointment: Akanksha Driver

WPtel:[phone]

                                        [facility] [facility] [address]

                                                           FOLLOW UP          

 

                                        2018                

 

                          Patient Education: Patient Medication Summary         

                          

                                        Completed                   2018  

              

 

                          Visit Diagnosis Plan: Essential (primary) hypertension

                   

Discussion: Has hydralazine to use prn per cardiology Going to do 30 day holter 
monitor

                                        ICD-9 : 401.9

ICD-10 : I10

                                                    2018                

 

                          Visit Diagnosis Plan: Hypoglycemia, unspecified       

            Discussion: 6 

small meals a day--each with protein Increase fluid intake

                                        ICD-9 : 251.2

ICD-10 : E16.2

                                                    2018                

 

                                        Visit Diagnosis Plan: Gastro-esophageal 

reflux disease without esophagitis      

                                        Discussion: Change to protonix 40mg po B

ID

Follow Up: 1 months

                                        ICD-9 : 530.81

ICD-10 : K21.9

                                                    2018                

 

                                        Appointment: Akanksha Driver

WPtel:[phone] [address] [facility] [address]

                                                           ACUTE ILLNESS      

 

                                        2018                

 

                          Patient Education: Patient Medication Summary         

                          

                                        Completed                   2018  

              

 

                          Visit Diagnosis Plan: Dizziness and giddiness         

          Discussion: 

blood work to be completed in office including cmp, cbc, troponin to rule out 
glucose intolerance, infection, dehydration. instructed patient that she needs 
to see her cardiologist sooner than oct and patient requested we contact dr. clements's office. will have front office make appt. patient has carotid doppler 
annually.

                                        ICD-9 : 780.4

ICD-10 : R42

                                                    2018                

 

                                        Appointment: Nat Lozoya

                                        [address]                                                           ACUTE ILLNESS      

 

                                        2018                

 

                          Patient Education: Patient Medication Summary         

                          

                                        Completed                   2018  

              

 

                          Visit Diagnosis Plan: Cervicalgia                   Di

scussion: Complete course 

of PT since symptoms improved Continue stretching exercises Notify if symptoms 
return or worsen

                                        ICD-9 : 723.1

ICD-10 : M54.2

                                                    2018                

 

                                        Appointment: Akanksha Driver

WPtel:[phone]

                                        [address] [place] [address]                                                           FOLLOW UP          

 

                                        2018                

 

                          Patient Education: Patient Medication Summary         

                          

                                        Completed                   2018  

              

 

                          Visit Diagnosis Plan: Hypoglycemia, unspecified       

            Discussion: 

Eat something with protein every 2 hrs

                                        ICD-9 : 251.2

ICD-10 : E16.2

                                                    2018                

 

                                        Visit Diagnosis Plan: Other spondylosis 

with radiculopathy, cervical region     

                                        Discussion: Check MRI of cervical spine

                                        ICD-9 : 721.0

ICD-10 : M47.22

                                                    2018                

 

                          Visit Diagnosis Plan: Vertigo of central origin, bilat

eral                   

Discussion: Discussed PT for vestibular therapy

                                        ICD-9 : 386.2

ICD-10 : H81.43

                                                    2018                

 

                                        Appointment: Akanksha Driver

WPtel:[phone]

                                        [address] [place] [address]                                                                             

2018                

 

                          Patient Education: Patient Medication Summary         

                          

                                        Completed                   2018  

              

 

                    Care Plan: MRI NECK SPINE W/O DYE                           

            LOINC : 

57099-0

                          Pending                   2018                

 

                          Visit Diagnosis Plan: Benign paroxysmal vertigo, bilat

eral                   

Discussion: patient has meclizine at home but states it makes her too tired. 
instructed patient to cut in half and take at night. if it doesn't cause too 
much drowsiness, instructed to take bid until feeling better. instructed to rtc 
wed if no improvement or worsening symptoms. instructed patient to increase 
fluid intake as well.

                                        ICD-9 : 386.11

ICD-10 : H81.13

                                                    2018                

 

                          Visit Diagnosis Plan: Otalgia, bilateral              

     Discussion: kenalog 

40 mg given to patient im. instructed patient to monitor blood sugar at home due
to risk of increased sugar. instructed patient to rtc on wednesday if no 
improvement and may require antibiotic.

                                        ICD-9 : 388.70

ICD-10 : H92.03

                                                    2018                

 

                                        Appointment: Nat Lozoya

                                        [address] [place] [address]                                                           ACUTE ILLNESS      

 

                                        2018                

 

                          Patient Education: Patient Medication Summary         

                          

                                        Completed                   2018  

              

 

                          Visit Diagnosis Plan: Low back pain                   

Discussion: Stretches 

Topical aspercreme Tylenol 1 po TID

                                        ICD-9 : 724.2

ICD-10 : M54.5

                                                    2018                

 

                          Visit Diagnosis Plan: Radiculopathy, lumbosacral regio

n                   

Discussion: As above

                                        ICD-9 : 724.4

ICD-10 : M54.17

                                                    2018                

 

                          Visit Diagnosis Plan: Left lower quadrant pain        

           Discussion: 

Culture urine Notify if worsens

                                        ICD-9 : 789.04

ICD-10 : R10.32

                                                    2018                

 

                                        Appointment: Akanksha Driver

WPtel:[phone]

                                        [address] [place] [address]                                                           ACUTE ILLNESS      

 

                                        2018                

 

                          Patient Education: Patient Medication Summary         

                          

                                        Completed                   2018  

              

 

                                        Appointment: Akanksha Driver

WPtel:[phone]

                                        [address] [place] [address]

[place]                                                           INJECTION          

 

                                        10/06/2017                

 

                          Patient Education: Patient Medication Summary         

                          

                                        Completed                   10/06/2017  

              

 

                                        Appointment: Akanksha Driver

WPtel:[phone]

                                        [address] [place] [address]                                                           LAB                

 

                                        2017                

 

                          Patient Education: Patient Medication Summary         

                          

                                        Completed                   2017  

              

 

                                        Visit Diagnosis Plan: Other intervertebr

al disc degeneration, lumbar region     

                                        Follow Up: 3 months

                                        ICD-9 : 722.52

ICD-10 : M51.36

                                                    2017                

 

                          Visit Diagnosis Plan: Pain in thoracic spine          

         Discussion: PT 

has helped Continue stretches and walking [facility]ed [facility] 
continue exercise

                                        ICD-9 : 724.1

ICD-10 : M54.6

                                                    2017                

 

                                        Appointment: Akanksha Driver

WPtel:[phone]

                                        [address] [place] [address]

[place]                                                           FOLLOW UP          

 

                                        2017                

 

                          Patient Education: Patient Medication Summary         

                          

                                        Completed                   2017  

              

 

                          Visit Diagnosis Plan: Low back pain                   

Discussion: Start PT for 

low back- Seems like abdominal pain is radiating from low back

Follow Up: 5 weeks

                                        ICD-9 : 724.2

ICD-10 : M54.5

                                                    2017                

 

                          Visit Diagnosis Plan: Left lower quadrant pain        

           Discussion: Had

CT scan of abdomen/pelvis in 2017 and normal colonoscopy in 2015

                                        ICD-9 : 789.04

ICD-10 : R10.32

                                                    2017                

 

                                        Appointment: Akanksha Driver

WPtel:[phone]

                                        [address] [place], [place] [address]

                                                           ACUTE ILLNESS      

 

                                        2017                

 

                          Patient Education: Patient Medication Summary         

                          

                                        Completed                   2017  

              

 

                                        Appointment: Akanksha Driver

WPtel:[phone]

                                        [address] [place], [place] [address]

[place]                                                           LAB                

 

                                        2017                

 

                          Patient Education: Patient Medication Summary         

                          

                                        Completed                   2017  

              

 

                          Visit Diagnosis Plan: Mixed hyperlipidemia            

       Discussion: Check 

lipids

                                        ICD-9 : 272.4

ICD-10 : E78.2

                                                    2017                

 

                          Visit Diagnosis Plan: Impaired fasting glucose        

           Discussion: 

Return in AM for CMP, HbA1C Accuchecks daily Diet/Exercise discussed at length

                                        ICD-9 : 790.29

ICD-10 : R73.01

                                                    2017                

 

                          Visit Diagnosis Plan: Mastodynia                   Dis

cussion: Check Bilateral 

diagnostic mammogram with US

                                        ICD-9 : 611.71

ICD-10 : N64.4

                                                    2017                

 

                          Visit Diagnosis Plan: Other fatigue                   

Discussion: Check CBC, TSH

                                        ICD-9 : 780.79

ICD-10 : R53.83

                                                    2017                

 

                                        Appointment: Akanksha Driver

WPtel:[phone]

                                        [address] [place], [place] [address]

[place]                   3/7 confirmed `sl                                       

ACUTE ILLNESS                           2017                

 

                          Patient Education: Patient Medication Summary         

                          

                                        Completed                   2017  

              

 

                    Care Plan: MAMMOGRAM SCREENING                              

         LOINC : 

78392-8

                          Pending                   2017                

 

                                        Visit Diagnosis Plan: Acute upper respir

atory infection, unspecified            

                                        Discussion: Exam is reassuring Likely vi

ral illness Supportive care 

reviewed and encouraged Follow up PRN

                                        ICD-9 : 465.9

ICD-10 : J06.9

                                                    2017                

 

                                        Appointment: Luba Stevenson 

                                        [address] [place] [address]                                                           ACUTE ILLNESS      

 

                                        2017                

 

                          Patient Education: Patient Medication Summary         

                          

                                        Completed                   2017  

              

 

                          Visit Plan:                    Supportive care. Rest, 

Fluids, Tylenol/Motrin prn

fever or bodyaches. Notify if worsening symptoms.

                                                            2016          

  

   

 

                                        Appointment: Akanksha Driver

WPtel:[phone]

                                        [address] [place] [address]                                                           ACUTE ILLNESS      

 

                                        2016                

 

                          Patient Education: Patient Medication Summary         

                          

                                        Completed                   2016  

              

 

                                        Appointment: Akanksha Driver

WPtel:[phone]

                                        [address] [place] [address]                                                           INJECTION          

 

                                        10/07/2016                

 

                          Patient Education: Patient Medication Summary         

                          

                                        Completed                   10/07/2016  

              

 

                                        Appointment: Akanksha Driver

WPtel:[phone]

                                        [address] [place] [address]                                                           LAB                

 

                                        2016                

 

                          Patient Education: Patient Medication Summary         

                          

                                        Completed                   2016  

              

 

                          Visit Plan:                    Add miralax daily Use d

aily probiotic and 

metamucil and push fluids Notify if worsens/persists

                                                            2016          

  

   

 

                                        Appointment: Akanksha Driver

WPtel:[phone]

                                        [address] [place] [address]                        16 confirmed ~sl                                 

     

                          ACUTE ILLNESS                   2016            

    

 

                          Patient Education: Patient Medication Summary         

                          

                                        Completed                   2016  

              

 

                          Visit Plan:                    No NSAIDs Kidney functi

on discussed Discussed 

hydration Monitor lab every 4months so will check CMP, HbA1C next month

                                                            2016          

  

   

 

                          Visit Plan:                    No NSAIDs Kidney functi

on discussed Discussed 

hydration Monitor lab every 4months so will check CMP, HbA1C next month

                                                            2016          

  

   

 

                                        Appointment: Akanksha Driver

WPtel:[phone]

                                        [address] [place] [address]                   03/15 confirmed ~sl                                       

ACUTE ILLNESS                           2016                

 

                          Patient Education: Patient Medication Summary         

                          

                                        Completed                   2016  

              

 

                          Visit Plan:                    Vestibular exercises Re

fill flonase Strict low Na

diet--discussed that needs to read labels Rx for walker given Has carotids and 
abdominal aorta and legs checked in January Check Chem 7

                                                            2015          

  

   

 

                          Visit Plan:                    Vestibular exercises Re

fill flonase Strict low Na

diet--discussed that needs to read labels Rx for walker with chair given due to 
muscle weakness/unsteadiness Has carotids and abdominal aorta and legs checked 
in January Check Chem 7

                                                            2015          

  

   

 

                          Visit Plan:                    Vestibular exercises Re

fill flonase Strict low Na

diet--discussed that needs to read labels Rx for walker given Has carotids and 
abdominal aorta and legs checked in January Check Chem 7

                                                            2015          

  

   

 

                          Visit Plan:                    Vestibular exercises Re

fill flonase Strict low Na

diet--discussed that needs to read labels Rx for walker with chair given due to 
muscle weakness/unsteadiness Has carotids and abdominal aorta and legs checked 
in January Check Chem 7

                                                            2015          

  

   

 

                                        Appointment: Akanksha Driver

WPtel:[phone]

                                        [address] [place], [place] [address]                        12/15/15 appt confirmed cn                            

     

                          FOLLOW UP                   2015                

 

                          Patient Education: Patient Medication Summary         

                          

                                        Completed                   2015  

              

 

                    Patient Education: Vertigo                                  

                     

                          Completed                   2015                

 

                                        Appointment: Akanksha Driver

WPtel:[phone]

                                        [address] [place], [place] [id number]

US                                                           INJECTION          

 

                                        10/16/2015                

 

                          Patient Education: Patient Medication Summary         

                          

                                        Completed                   10/16/2015  

              

 

                                        Appointment: Akanksha Driver

WPtel:[phone]

                                        [address] [place], [place] [id number]

                                                           UA                 

 

                                        09/15/2015                

 

                          Patient Education: Patient Medication Summary         

                          

                                        Completed                   09/15/2015  

              

 

                                        Referral: Landon De La Torre

WPtel:[phone]

[id number]

[place]                   Referral                                       Completed   

                                        2015                

 

                                        Appointment: Akanksha Driver

WPtel:[phone]

                                        [address] [place], [place] [id number]

US                                                           LAB                

 

                                        09/10/2015                

 

                          Patient Education: Patient Medication Summary         

                          

                                        Completed                   09/10/2015  

              

 

                          Visit Plan:                    Check CMP, CBC, TSH, Fr

ee T4, B12, Lipids, HbA1C 

Discussed 6 small meals a day each with protein Would likely benefit from 
antidepressant Update colonoscopy

                                                            2015          

  

   

 

                                        Appointment: Akanksha Driver

WPtel:[phone]

                                        [address] [place], [place] [id number]

                   9/8/15 confirmed                                       

ACUTE ILLNESS                           2015                

 

                          Patient Education: Patient Medication Summary         

                          

                                        Completed                   2015  

              

 

                          Visit Plan:                    Cerumen flush with warm

 water and peroxide - good

results Resume Nasonex nasal spray daily Recommended Debrox earwax removal drops

                                                            2015          

  

   

 

                          Visit Plan:                    Cerumen flush with warm

 water and peroxide - good

results Resume Nasonex nasal spray daily Recommended Debrox earwax removal drops

                                                            2015          

  

   

 

                                        Appointment: Bertha Mon

WPtel:[phone]

                                        [address] [place], [place] [id number]                                                           ACUTE ILLNESS      

 

                                        2015                

 

                          Patient Education: Patient Medication Summary         

                          

                                        Completed                   2015  

              

 

                          Visit Plan:                    Continue aspirin daily 

Add meloxicam for 1week 

Elevate and Ice Call on 2015          

  

   

 

                                        Appointment: Akanksha Driver

WPtel:[phone]

                                        [address] [place], [place] [id number]                                                           ACUTE ILLNESS      

 

                                        2015                

 

                          Patient Education: Patient Medication Summary         

                          

                                        Completed                   2015  

              

 

                          Visit Plan:                    Been using baclofen at 

bedtime and has helped 

some PT for next 2-4weeks

                                                            2015          

  

   

 

                                        Appointment: Akanksha Driver

WPtel:[phone]

                                        [address] [place], [place] [id number]

                                                           Hospital Follow Up 

 

                                        2015                

 

                          Patient Education: Patient Medication Summary         

                          

                                        Completed                   2015  

              

 

                                        Appointment: Akanksha Driver

WPtel:[phone]

                                        [address] [place], [place] [id number]                                                           LAB                

 

                                        2015                

 

                                        Appointment: Akanksha Driver

WPtel:[phone]

                                        [address] [place], [place] [id number]

                        feeling better, weather -                           

     

                          FOLLOW UP                   2015                

 

                                        Referral: Landon De La Torre

WPtel:[phone]

[id number]

[place]                   Referral                                       Appointment 

Requested                               2015                

 

                          Visit Plan:                    Continue exercise and c

urrent meds See surgery 

for removal of right arm lesion

                                                            2015          

  

   

 

                                        Appointment: Akanksha Driver

WPtel:[phone]

                                        [address] [place], [place] [id number]

                                                           FOLLOW UP          

 

                                        2015                

 

                          Patient Education: Patient Medication Summary         

                          

                                        Completed                   2015  

              

 

                                        Appointment: Akanksha Driver

WPtel:[phone]

                                        [address] [place], [place] [address]

US                                                           INJECTION          

 

                                        10/17/2014                

 

                          Patient Education: Patient Medication Summary         

                          

                                        Completed                   10/17/2014  

              

 

                          Visit Plan:                    Resume Claritin PO kathleen

y May take Ibuprofen PM at

night for sleep Continue Flonase 2 sprays each nostril bid Complete prednisone 
20 mg PO bid

                                                            2014          

  

   

 

                                        Appointment: Bertha Mon

WPtel:[phone]

                                        [address] [place] [address]                                                           ACUTE ILLNESS      

 

                                        2014                

 

                          Patient Education: Patient Medication Summary         

                          

                                        Completed                   2014  

              

 

                          Visit Plan:                    Discussed that likely l

umbar etiology for leg 

weakness Will change amlodopine to low dose dyazide and see if helps legs and 
inner ear

                                                            2014          

  

   

 

                                        Appointment: Akanksha Driver

WPtel:[phone]

                                        [address] [place], [place] [id number]

                                                           FOLLOW UP          

 

                                        2014                

 

                          Patient Education: Patient Medication Summary         

                          

                                        Completed                   2014  

              

 

                          Visit Plan:                    Ceftin and continue cla

ritin/flonase Decrease 

amlodopine to 2.5mg q HS until fwup with Card due to weakness

                                                            2014          

  

   

 

                                        Appointment: Akanksha Driver

WPtel:[phone]

                                        [address] [place], [place] [id number]

                                                           ACUTE ILLNESS      

 

                                        2014                

 

                          Patient Education: Patient Medication Summary         

                          

                                        Completed                   2014  

              

 

                                        Appointment: Akanksha Driver

WPtel:[phone]

                                        [address] [place], [place] [id number]

                                                           LAB                

 

                                        2014                

 

                          Patient Education: Patient Medication Summary         

                          

                                        Completed                   2014  

              

 

                          Visit Plan:                    States she is having he

r carotids "done" this 

14 Return this week for fasting labs. CBC, CMP, TSH Free T4, 
Lipid Panel and HgbA1C.

                                                            2014          

  

   

 

                                        Appointment: Bertha Mon

WPtel:[phone]

                                        [address] [place], [place] [address]                                                           ACUTE ILLNESS      

 

                                        2014                

 

                          Patient Education: Patient Medication Summary         

                          

                                        Completed                   2014  

              

 

                          Visit Plan:                    Supportive care. Rest, 

Fluids, Tylenol prn fever 

or bodyaches. Notify if worsening symptoms. Ceftin

                                                            10/15/2013          

  

   

 

                                        Appointment: Bertha Mon

WPtel:[phone]

                                        [address] [place] [address]                                                           ACUTE ILLNESS      

 

                                        10/15/2013                

 

                          Patient Education: Patient Medication Summary         

                          

                                        Completed                   10/15/2013  

              

 

                                        Appointment: Akanksha Driver

WPtel:[phone]

                                        [address] [place] [address]                                                           BP CHECK           

 

                                        2013                

 

                          Patient Education: Patient Medication Summary         

                          

                                        Completed                   2013  

              

 

                          Visit Plan:                    Continue increased dose

 of metoprolol Moniter BP 

at home BP check in 1wk Xanax refill to use prn

                                                            2013          

  

   

 

                          Visit Plan:                    Continue increased dose

 of metoprolol Moniter BP 

at home BP check in 1wk

                                                            2013          

  

   

 

                                        Appointment: Akanksha Driver

WPtel:[phone]

                                        [address] [place] [address]                                                           ER Follow UP       

 

                                        2013                

 

                          Patient Education: Patient Medication Summary         

                          

                                        Completed                   2013  

              

 

                          Visit Plan:                    Restart flonase BID Use

 meclizine 25mg q HS 

Vestibular exercises Claritin 10mg q AM See ENT if doesn't resolve

                                                            2013          

  

   

 

                                        Appointment: Akanksha Driver

WPtel:[phone]

                                        [address] [place] [address]                                                           ACUTE ILLNESS      

 

                                        2013                

 

                          Patient Education: Patient Medication Summary         

                          

                                        Completed                   2013  

              

 

                          Visit Plan:                    Will continue to observ

e

                                                            2013          

  

   

 

                                        Appointment: Akanksha Driver

WPtel:[phone]

                                        [address] [place], [place] [id number]                                                           FOLLOW UP          

 

                                        2013                

 

                          Patient Education: Patient Medication Summary         

                          

                                        Completed                   2013  

              

 

                          Visit Plan:                    ERx for Augmentin 875mg

 q12 x 10 days and Floxin 

otic drops 5 gtts AD x7days Sample of Nasonex per pt request Discussed treatment
(nasal saline, salt water gargles, keeping right ear clean and dry, for 
worsening go to  on weekend, Tylenol/ibuprofen, etc.) RTC 2 weeks for ear 
recheck.

                                                            05/10/2013          

  

   

 

                                        Appointment: Jill Jean 

WPtel:[phone]

                                        [address] [place], [place] [address]                                                           ACUTE ILLNESS      

 

                                        05/10/2013                

 

                          Patient Education: Patient Medication Summary         

                          

                                        Completed                   05/10/2013  

              

 

                          Visit Plan:                    Decrease caffeine intak

e Check Bilateral 

Mammogram with US of left breast

                                                            2013          

  

   

 

                                        Appointment: Akanksha Driver

WPtel:[phone]

                                        [address] [place] [address]                                                           ACUTE ILLNESS      

 

                                        2013                

 

                          Patient Education: Patient Medication Summary         

                          

                                        Completed                   2013  

              

 

                                        Appointment: Kate Hall

WPtel:[phone]

                                        [address] [place], [place] [id number]                   appt scheduled                                        

ACUTE ILLNESS                           2013                

 

                                        Appointment: Akanksha Driver

WPtel:[phone]

                                        [address] [place], [place] [id number]

US                                                           LAB                

 

                                        2012                

 

                          Patient Education: Patient Medication Summary         

                          

                                        Completed                   2012  

              

 

                          Visit Plan:                    Continue off carafate a

nd see how does Continue 

probiotic Add Vestibular exercises and use meclizine prn Continue Nasonex

                                                            2012          

  

   

 

                          Visit Plan:                    Continue off carafate a

nd see how does Continue 

probiotic Add Vestibular exercises and use meclizine prn Continue Nasonex Check 
fasting lab including CMP, Lipids, CBC, TSH, Free T4, HbA1C

                                                            2012          

  

   

 

                                        Appointment: Akanksha Driver

WPtel:[phone]

                                        [address] [place], [place] [id number]

US                                                           FOLLOW UP          

 

                                        2012                

 

                          Patient Education: Patient Medication Summary         

                          

                                        Completed                   2012  

              

 

                          Visit Plan:                    Continue carafate for 4

more weeks at current dose

then decrease to BID for 2wks then q HS

                                                            10/23/2012          

  

   

 

                                        Appointment: Akanksha Drivertel:[phone]

                                        [address] [place] [address]                                                           FOLLOW UP          

 

                                        10/23/2012                

 

                          Patient Education: Patient Medication Summary         

                          

                                        Completed                   10/23/2012  

              

 

                          Visit Plan:                    Continue omeprazole Add

 Carafate 1gm po q AC Add 

daily probiotic

                                                            10/10/2012          

  

   

 

                                        Appointment: Akanksha Drivertel:[phone]

                                        [address] [place] [address]                                                           ACUTE ILLNESS      

 

                                        10/10/2012                

 

                          Patient Education: Patient Medication Summary         

                          

                                        Completed                   10/10/2012  

              

 

                                        Appointment: Akanksha Drivertel:[phone]

                                        [address] [place] [address]                                                           INJECTION          

 

                                        2012                

 

                          Patient Education: Patient Medication Summary         

                          

                                        Completed                   2012  

              

 

                          Visit Plan:                    Diabetic Diet Accucheck

s BID alternating times 

Hold onglyza and Januvia for now and continue diet and exercise and weight loss 
and moniter BS Discussed hypoglycemia and snack of peanut butter and crackers 
with juice or milk

                                                            2012          

  

   

 

                                        Appointment: Akanksha Drivertel:[phone]

                                        [address] [place] [address]                                                           WORK IN            

 

                                        2012                

 

                          Patient Education: Patient Medication Summary         

                          

                                        Completed                   2012  

              

 

                                        Appointment: Akanksha Drivertel:[phone]

                                        [address] [place], [place] [id number]                                                           UA                 

 

                                        2012                

 

                          Patient Education: Patient Medication Summary         

                          

                                        Completed                   2012  

              

 

                                        Appointment: Akanksha Drivertel:[phone]

                                        [address] [place] [address]                                                           LAB                

 

                                        2012                

 

                          Patient Education: Patient Medication Summary         

                          

                                        Completed                   2012  

              

 

                          Visit Plan:                    Fasting lab to check CM

P, Lipids, CBC, TSH, Free 

T4, HbA1C, Insulin level Hold Zocor for next 2wks

                                                            2012          

  

   

 

                                        Appointment: Akanksha Drivertel:[phone] [address]

[id number]

[place]                                                           ACUTE ILLNESS      

 

                                        2012                

 

                          Patient Education: Patient Medication Summary         

                          

                                        Completed                   2012  

              

 

                          Visit Plan:                    Injection to Right SI j

oint as above Pt will call

in 3 days on pain Has PT starting in 2wks.

                                                            2012          

  

   

 

                                        Appointment: Akanksha Driver

WPtel:[phone]

                                        [address] [place] [address]                                                           ACUTE ILLNESS      

 

                                        2012                

 

                          Patient Education: Patient Medication Summary         

                          

                                        Completed                   2012  

              

 

                          Visit Plan:                    OMT done Start PT May n

eed updated MRI if need to

consider epidural Prednisone

                                                            2012          

  

   

 

                                        Appointment: Akanksha Driver

WPtel:[phone]

                                        [address] [place] [address]                                                           OMT                

 

                                        2012                

 

                          Patient Education: Patient Medication Summary         

                          

                                        Completed                   2012  

              

 

                          Visit Plan:                    Flexeril and Vimovo OMT

 done Daily stretches

                                                            2012          

  

   

 

                                        Appointment: Akanksha Driver

WPtel:[phone]

                                        [address] [place] [address]                                                           ACUTE ILLNESS      

 

                                        2012                

 

                          Patient Education: Patient Medication Summary         

                          

                                        Completed                   2012  

              

 

                          Visit Plan:                    OMT done Daily stretche

s Moist heat or biofreeze 

Right SI joint injection Call in 1week Vimovo BID

                                                            2012          

  

   

 

                                        Appointment: Akanksha Drvier

WPtel:[phone]

                                        [address] [place] [address]                                                           ACUTE ILLNESS      

 

                                        2012                

 

                          Patient Education: Patient Medication Summary         

                          

                                        Completed                   2012  

              

 

                                        Appointment: Akanksha Driver

WPtel:[phone]

                                        [address] [place] [address]

[place]                                                           LAB                

 

                                        2012                

 

                          Patient Education: Patient Medication Summary         

                          

                                        Completed                   2012  

              

 

                          Visit Plan:                    Decrease amlodopine to 

1.25mg daily Schedule with

Cardiology Fasting lab in AM

                                                            2012          

  

   

 

                                        Appointment: Akanksha Driver

WPtel:[phone]

                                        [address] [place] [address]                                                           ACUTE ILLNESS      

 

                                        2012                

 

                          Patient Education: Patient Medication Summary         

                          

                                        Completed                   2012  

              

 

                          Visit Plan:                    Saline nasal flushes pr

n. Tylenol/Motrin prn 

headache. Notify if persists/symptoms worsening. Cerumen removal from ears as 
above

                                                            2011          

  

   

 

                                        Appointment: Akanksha Drivertel:[phone]

                                        [address] [place] [address]                                                           ACUTE ILLNESS      

 

                                        2011                

 

                          Patient Education: Patient Medication Summary         

                          

                                        Completed                   2011  

              

 

                                        Appointment: Akanksha Driver

WPtel:[phone]

                                        [address] [place] [address]

[place]                                                           INJECTION          

 

                                        10/05/2011                

 

                          Patient Education: Patient Medication Summary         

                          

                                        Completed                   10/05/2011  

              

 

                          Visit Plan:                    Continue vimovo for 1mo

re week Increase 

Omeprazole to BID for 1mo then resume QD if stomach improved Vestibular 
exercises with meclizine q HS for next week

                                                            2011          

  

   

 

                                        Appointment: Akanksha Driver

WPtel:[phone]

                                        [address] [place] [address]                                                           FOLLOW UP          

 

                                        2011                

 

                          Patient Education: Patient Medication Summary         

                          

                                        Completed                   2011  

              

 

                          Visit Plan:                    Trial of Vimovo 50/200m

g po BID Check UA

                                                            2011          

  

   

 

                                        Appointment: Akanksha Driver

WPtel:[phone]

                                        [address] [place] [address]                                                           ACUTE ILLNESS      

 

                                        2011                

 

                          Patient Education: Patient Medication Summary         

                          

                                        Completed                   2011  

              

 

                                        Appointment: Akanksha Driver

WPtel:[phone]

                                        [address] [place], [place] [id number]                                                           LAB                

 

                                        2011                

 

                          Patient Education: Patient Medication Summary         

                          

                                        Completed                   2011  

              

 

                          Visit Plan:                    Saline nasal flushes pr

n. Tylenol/Motrin prn 

headache. Notify if persists/symptoms worsening. Add Veramyst Increase 
Omeprazole to 20mg po BID

                                                            2011          

  

   

 

                                        Appointment: Akanksha Driver

WPtel:[phone]

                                        [address] [place] [address]                                                           ACUTE ILLNESS      

 

                                        2011                

 

                          Patient Education: Patient Medication Summary         

                          

                                        Completed                   2011  

              

 

                          Visit Plan:                    Injection to right SI j

oint as above Continue 

Vimovo Daily stretches Pt will call at end of week to let us know how back is 
doing

                                                            2011          

  

   

 

                                        Appointment: Akanksha Driver

WPtel:[phone]

                                        [address] [place], [place] [id number]

                                                           FOLLOW UP          

 

                                        2011                

 

                          Patient Education: Patient Medication Summary         

                          

                                        Completed                   2011  

              

 

                          Visit Plan:                    Toradol 30mg IM now x1 

Vimovo 200/50 po BID 

Decrease Norvasc to 1/2 tablet daily Increase Lexaprol to 10mg QD

                                                            2011          

  

   

 

                                        Appointment: Akanksha Driver

WPtel:[phone]

                                        [address] [place] [address]                                                           ACUTE ILLNESS      

 

                                        2011                

 

                          Patient Education: Patient Medication Summary         

                          

                                        Completed                   2011  

              

 

                          Visit Plan:                    Nasocourt sample given.

 Pt. will notify if 

symptoms are worse on Monday.

                                                            2010          

  

   

 

                                        Appointment: Kate Hall

WPtel:[phone]

                                        [address] [place] [address]                                                           ACUTE ILLNESS      

 

                                        2010                

 

                          Patient Education: Patient Medication Summary         

                          

                                        Completed                   2010  

              

 

                                        Appointment: Akanksha Driver

WPtel:[phone]

                                        [address] [place], [place] [address]

[place]                                                           INJECTION          

 

                                        10/06/2010                

 

                          Patient Education: Patient Medication Summary         

                          

                                        Completed                   10/06/2010  

              

 

                          Visit Plan:                    Cipro for UTI Cont Chad

pro at 5mg QD Flu shot 

next week

                                                            2010          

  

   

 

                                        Appointment: Akanksha Driver

WPtel:[phone]

                                        [address] [place], [place] [id number]

[place]                                                           FOLLOW UP          

 

                                        2010                

 

                          Patient Education: Patient Medication Summary         

                          

                                        Completed                   2010  

              

 

                    Patient Education: Lexapro                                  

                     

                          Completed                   2010                

 

                          Visit Plan:                    May proceed with hiatal

 hernia repair per Card. 

so will contact Dr. Cash's office to proceed with surgery Trial of 
Lexapro 5mg QD plus use xanax prn

                                                            2010          

  

   

 

                          Visit Plan:                    May proceed with hiatal

 hernia repair per Card. 

so will contact Dr. Cash's office to proceed with surgery

                                                            2010          

  

   

 

                                        Appointment: Akanksha Driver

WPtel:[phone]

                                        [address] [place] [address]                                                           FOLLOW UP          

 

                                        2010                

 

                          Patient Education: Patient Medication Summary         

                          

                                        Completed                   2010  

              

 

                          Visit Plan:                    B12 given Cont oral B12

 and iron Fwup 1mo for B12

Proceed with hiatal hernia repair once card clearance

                                                            2010          

  

   

 

                                        Appointment: Akanksha Driver

WPtel:[phone]

                                        [address] [place] [address]                                                           FOLLOW UP          

 

                                        2010                

 

                          Patient Education: Patient Medication Summary         

                          

                                        Completed                   2010  

              

 

                          Visit Plan:                    No caffeine, no nicotin

e, no mints, no late 

meals, elevate HOB 30 degrees Cont. with Nexium See Card to discuss Hiatal 
Hernia Repair B12 given

                                                            2010          

  

   

 

                                        Appointment: Akanksha Driver

WPtel:[phone]

                                        [address] [place] [address]                                                           FOLLOW UP          

 

                                        2010                

 

                          Patient Education: Patient Medication Summary         

                          

                                        Completed                   2010  

              

 

                                        Appointment: Akanksha Driver

WPtel:[phone]

                                        [address] [place] [address]

[place]                                                           LAB                

 

                                        2010                

 

                          Patient Education: Patient Medication Summary         

                          

                                        Completed                   2010  

              

 

                          Visit Plan:                    Check fasting lab in AM

--CMP,Lipids, CBC, Vit D, 

TSH,FreeT4, B12

                                                            2010          

  

   

 

                                        Appointment: Akanksha Driver

WPtel:[phone]

                                        [address] [place] [address]                                                           FOLLOW UP          

 

                                        2010                

 

                          Patient Education: Patient Medication Summary         

                          

                                        Completed                   2010  

              

 

                                        Referral: Landon De La Torre

WPtel:[phone]

#[address]                   Referral                                       Appointment 

Requested                                               

 

                                        Referral: Landon De La Torre

WPtel:[phone]

#[address]                   Referral                                       Initiated   

                                                        



                                                                                
                                                                                
                                                                                
                                                                                
                                                                                
                                                                                
                                                                                
                                                                                
                                                                                
                                                                                
                                                                                
                                                                                
                                                                                
                                                                                
                                                                                
                                                                                
                                                                                
                                                                                
                                                                                
                                                                                
                                                                                
                                                                                
                                                        



Instructions

                      



                                        Comment                

 

                                                            . ERx for Augmentin 

875mg q12 x 10 days and Floxin otic 

drops 5 gtts AD x7days

Sample of Nasonex per pt request

Discussed treatment (nasal saline, salt water gargles, keeping right ear clean 
and dry, for worsening go to UC on weekend, Tylenol/ibuprofen, etc.)

RTC 2 weeks for ear recheck.                                  

 

                                                            . Check fasting lab 

in AM--CMP,Lipids, CBC, Vit D, 

TSH,FreeT4, B12                                  

 

                                                            . Been using baclofe

n at bedtime and has helped some

PT for next 2-4weeks

                                  

 

                                                            .  Saline nasal flus

hes prn. Tylenol/Motrin prn headache.  

Notify if persists/symptoms worsening.

Cerumen removal from ears as above

                                  

 

                                                            . B12 given

Cont oral B12 and iron

Fwup 1mo for B12

Proceed with hiatal hernia repair once card clearance                           
      

 

                                                            . States she is havi

ng her carotids "done" this 14

Return this week for fasting labs.  CBC, CMP, TSH Free T4, Lipid Panel and 
HgbA1C.                                  

 

                                                            . Continue omeprazol

e

Add Carafate 1gm po q AC

Add daily probiotic

                                  

 

                                                            . Continue aspirin d

aily

Add meloxicam for 1week

Elevate and Ice

Call on Monday                                  

 

                                                            . Add miralax daily

Use daily probiotic and metamucil and push fluids

Notify if worsens/persists                                  

 

                                                            . Continue increased

 dose of metoprolol

Moniter BP at home

BP check in 1wk

Xanax refill to use prn                                  

 

                                                            . Continue increased

 dose of metoprolol

Moniter BP at home

BP check in 1wk                                  

 

                                                            . Restart flonase BI

D

Use meclizine 25mg q HS

Vestibular exercises

Claritin 10mg q AM

See ENT if doesn't resolve                                  

 

                                                            Right SI joint clean

sed with alchohol and betadine and 

injected with 3cc 1% lidocaine with 40mg depomedrol and 40mg kenalog, tolerated 
well with no complications, neosporin and bandage applied. OMT done

Daily stretches

Moist heat or biofreeze

Right SI joint injection

Call in 1week

Vimovo BID                                  

 

                                                            . Fasting lab to judith

ck CMP, Lipids, CBC, TSH, Free T4, 

HbA1C, Insulin level

Hold Zocor for next 2wks                                  

 

                                                            . Discussed that lik

ely lumbar etiology for leg weakness

Will change amlodopine to low dose dyazide and see if helps legs and inner ear  
                               

 

                                                            . Cipro for UTI

Cont Lexapro at 5mg QD

Flu shot next week                                  

 

                                                            . Will continue to o

bserve

                                  

 

                                                            . May proceed with h

iatal hernia repair per Card. so will 

contact Dr. Cash's office to proceed with surgery



Trial of Lexapro 5mg QD plus use xanax prn                                  

 

                                                            . May proceed with h

iatal hernia repair per Card. so will 

contact Dr. Cash's office to proceed with surgery                        
         

 

                                                            . OMT done

Start PT

May need updated MRI if need to consider epidural

Prednisone                                  

 

                                                            . Toradol 30mg IM no

w x1

Vimovo 200/50 po BID

Decrease Norvasc to 1/2 tablet daily

Increase Lexaprol to 10mg QD                                  

 

                                                            . Decrease amlodopin

e to 1.25mg daily

Schedule with Cardiology

Fasting lab in AM                                  

 

                                                            . Resume Claritin PO

 daily

May take Ibuprofen PM at night for sleep

Continue Flonase 2 sprays each nostril bid

Complete prednisone 20 mg PO bid



                                  

 

                                                            . Saline nasal flush

es prn. Tylenol/Motrin prn headache.  

Notify if persists/symptoms worsening.

Add Veramyst

Increase Omeprazole to 20mg po BID                                  

 

                                                            . Nasocourt sample g

iven.  Pt. will notify if symptoms are 

worse on Monday.                                   

 

                                                            . No NSAIDs

Kidney function discussed

Discussed hydration 

Monitor lab every 4months so will check CMP, HbA1C next month                   
              

 

                                                            . No NSAIDs

Kidney function discussed

Discussed hydration 

Monitor lab every 4months so will check CMP, HbA1C next month                   
              

 

                                                            . Vestibular exercis

es

Refill flonase

Strict low Na diet--discussed that needs to read labels

Rx for walker given

Has carotids and abdominal aorta and legs checked in January

Check Chem 7                                  

 

                                                            . Vestibular exercis

es

Refill flonase

Strict low Na diet--discussed that needs to read labels

Rx for walker with chair given due to muscle weakness/unsteadiness

Has carotids and abdominal aorta and legs checked in January

Check Chem 7

                                  

 

                                                            . Vestibular exercis

es

Refill flonase

Strict low Na diet--discussed that needs to read labels

Rx for walker given

Has carotids and abdominal aorta and legs checked in January

Check Chem 7                                  

 

                                                            . Vestibular exercis

es

Refill flonase

Strict low Na diet--discussed that needs to read labels

Rx for walker with chair given due to muscle weakness/unsteadiness

Has carotids and abdominal aorta and legs checked in January

Check Chem 7

                                  

 

                                                            . Injection to right

 SI joint as above

Continue Vimovo

Daily stretches

Pt will call at end of week to let us know how back is doing                    
             

 

                                                            . Cerumen flush with

 warm water and peroxide - good results 

Resume Nasonex nasal spray daily

Recommended Debrox earwax removal drops                                   

 

                                                            . Cerumen flush with

 warm water and peroxide - good results 

Resume Nasonex nasal spray daily

Recommended Debrox earwax removal drops                                   

 

                                                            . Continue vimovo fo

r 1more week

Increase Omeprazole to BID for 1mo then resume QD if stomach improved

Vestibular exercises with meclizine q HS for next week                          
       

 

                                                            . Diabetic Diet

Accuchecks BID alternating times

Hold onglyza and Januvia for now and continue diet and exercise and weight loss 
and moniter BS

Discussed hypoglycemia and snack of peanut butter and crackers with juice or 
milk                                  

 

                                                            . Continue exercise 

and current meds

See surgery for removal of right arm lesion                                  

 

                                                            . Continue off caraf

ate and see how does

Continue probiotic

Add Vestibular exercises and use meclizine prn

Continue Nasonex                                  

 

                                                            Left ear flushed wit

h warm water and peroxide with ear 

syringe and then last removed with currette--peroxide drops instilled.  
Tolerated well, no complications, TM intact.. Continue off carafate and see how 
does

Continue probiotic

Add Vestibular exercises and use meclizine prn

Continue Nasonex

Check fasting lab including CMP, Lipids, CBC, TSH, Free T4, HbA1C               
                  

 

                                                            . Supportive care.  

Rest, Fluids, Tylenol/Motrin prn fever 

or bodyaches.  Notify if worsening symptoms.

                                  

 

                                                            . Continue carafate 

for 4more weeks at current dose then 

decrease to BID for 2wks then q HS                                  

 

                                                            . Decrease caffeine 

intake

Check Bilateral Mammogram with US of left breast                                
 

 

                                                            . Trial of Vimovo 50

/200mg po BID

Check UA                                  

 

                                                            . Flexeril and Vimov

o

OMT done

Daily stretches                                  

 

                                                            . No caffeine, no ni

cotine, no mints, no late meals, elevate

HOB 30 degrees

Cont. with Nexium

See Card to discuss Hiatal Hernia Repair

B12 given                                  

 

                                                            .  Supportive care. 

 Rest, Fluids, Tylenol prn fever or 

bodyaches.  Notify if worsening symptoms.

Ceftin                                  

 

                                                            . Ceftin and continu

e claritin/flonase

Decrease amlodopine to 2.5mg q HS until fwup with Card due to weakness          
                       

 

                                                            . Check CMP, CBC, TS

H, Free T4, B12, Lipids, HbA1C

Discussed 6 small meals a day each with protein

Would likely benefit from antidepressant 

Update colonoscopy                                  

 

                                                            Informed Consent obt

ainded.  Right SI joint cleansed with 

alcohol and betadine and injected with 3cc 1%l lidocaine with 40mg depomedrol 
and 40mg kenalog, tolerated well with no complications, neosporin and bandage 
applied. Injection to Right SI joint as above

Pt will call in 3 days on pain

Has PT starting in 2wks.

## 2020-02-19 NOTE — XMS REPORT
CCD document using C-CDA

                             Created on: 2020



Leilani Ramírez

External Reference #: [id number]

: 1940

Sex: Female



Demographics





                          Address                   [address] [place]  [address]

 

                          Home Phone                [phone]

 

                          Preferred Language        Unknown

 

                          Marital Status            

 

                          Hinduism Affiliation     Unknown

 

                          Race                      White

 

                          Ethnic Group              Not  or 





Author





                          Author                    Leilani Driver D.O.

 

                          Organization              [facility]

 

                          Address                   [address] [place]  [address]



 

                          Phone                     [phone]







Care Team Providers





                    Care Team Member Name Role                Phone

 

                    Akanksha Driver D.O., PP                  Unavailable

 

                          CCM                       Unavailable







Summary Purpose

Interface Exchange



Insurance Providers





             Payer name   Policy type / Coverage type Covered party ID Effective

 Begin Date 

Effective End Date

 

             [place] [place] [id number]  2018   

  Unknown

 

             [employer] Senior Supplemental Medicare Part B [id number]   [id number]    

 Unknown







Family history





Father



                          Diagnosis                 Age At Onset

 

                          Heart disease             Unknown







Mother



                          Diagnosis                 Age At Onset

 

                          Heart disease             Unknown

 

                          Cancer                    Unknown







Social History





                Social History Element Codes           Description     Effective

 Dates

 

                Tobacco history SNOMED CT: 242973638 Never smoker    2011

 

                Marital status  Unknown         Single          2010

 

                Number of children Unknown         9               2010







Allergies, Adverse Reactions, Alerts





           Substance  Reaction   Codes      Entered Date Inactivated Date Status

 

           _                     Unknown    2019 No Inactive Date Active

 

           * NO KNOWN FOOD ALLERGIES            Unknown    2010 No Inactiv

e Date Active

 

           _                     Unknown    2010 No Inactive Date Active

 

           QUINIDINE-QUININE ANALOGUES hives,     Unknown    2010 No Inact

diamante Date Active







Problems





                Condition       Codes           Effective Dates Condition Status

 

                          Essential hypertension    ICD-9: 401.9

ICD-10: I10               2014                Active

 

                          Palpitations              ICD-9: 785.1

ICD-10: R00.2             10/02/2018                Active

 

                          Stress reaction           ICD-9: 308.9

ICD-10: F43.0             2020                Active

 

                          Mixed hyperlipidemia      ICD-9: 272.4

ICD-10: E78.2             2019                Active

 

                          FLU VACCINE               ICD-9: V04.81

ICD-10: Z23               2012                Active

 

                          Acute serous otitis media, left ear ICD-9: 381.01

ICD-10: H65.02            2019                Active

 

                          Coronary atherosclerosis due to calcified coronary les

ion ICD-9: 414.00

ICD-10: I25.84            2018                Active

 

                          Hypoglycemia, unspecified ICD-9: 251.2

ICD-10: E16.2             2017                Active

 

                          Other fatigue             ICD-9: 780.79

ICD-10: R53.83            2017                Active

 

                          Urinary tract infection, site not specified ICD-9: 599

.0

ICD-10: N39.0             10/02/2018                Active

 

                          Gastro-esophageal reflux disease without esophagitis I

CD-9: 530.81

ICD-10: K21.9             2018                Active

 

                          Epigastric pain           ICD-9: 789.06

ICD-10: R10.13            2018                Active

 

                          Atherosclerotic heart disease of native coronary arter

y without angina pectoris 

ICD-9: 414.00

ICD-10: I25.10            2018                Active

 

                          Generalized anxiety disorder ICD-9: 300.00

ICD-10: F41.1             2018                Active

 

                          Dizziness and giddiness   ICD-9: 780.4

ICD-10: R42               2014                Active

 

                          Occlusion and stenosis of bilateral carotid arteries I

CD-9: 433.10

ICD-10: I65.23            2018                Active

 

                          Cervicalgia               ICD-9: 723.1

ICD-10: M54.2             2018                Active

 

                          Other spondylosis with radiculopathy, cervical region 

ICD-9: 721.0

ICD-10: M47.22            2018                Active

 

                          Cervicocranial syndrome   ICD-9: 723.2

ICD-10: M53.0             2018                Active

 

                          Vertigo of central origin, bilateral ICD-9: 386.2

ICD-10: H81.43            2018                Active

 

                          Benign paroxysmal vertigo, bilateral ICD-9: 386.11

ICD-10: H81.13            2018                Active

 

                          Otalgia, bilateral        ICD-9: 388.70

ICD-10: H92.03            2018                Active

 

                          Left lower quadrant pain  ICD-9: 789.04

ICD-10: R10.32            2017                Active

 

                          Low back pain             ICD-9: 724.2

ICD-10: M54.5             2017                Active

 

                          Radiculopathy, lumbosacral region ICD-9: 724.4

ICD-10: M54.17            2018                Active

 

                          Impaired fasting glucose  ICD-9: 790.29

ICD-10: R73.01            2012                Active

 

                          Other intervertebral disc degeneration, lumbar region 

ICD-9: 722.52

ICD-10: M51.36            2017                Active

 

                          Pain in thoracic spine    ICD-9: 724.1

ICD-10: M54.6             2017                Active

 

                          Mastodynia                ICD-9: 611.71

ICD-10: N64.4             2017                Active

 

                          Acute upper respiratory infection, unspecified ICD-9: 

465.9

ICD-10: J06.9             2017                Active

 

                          Acute mastoiditis without complications, right ear ICD

-9: 383.00

ICD-10: H70.001           2016                Active

 

                          Constipation, unspecified ICD-9: 564.00

ICD-10: K59.00            2016                Active

 

                          Chronic kidney disease, stage 1 ICD-9: 585.1

ICD-10: N18.1             03/15/2016                Active

 

                          History of falling        ICD-9: V15.88

ICD-10: Z91.81            12/15/2015                Active

 

                          Muscle weakness (generalized) ICD-9: 780.79

ICD-10: M62.81            2015                Active

 

                Acute renal failure ICD-9: 584.9    2015      Active

 

                POLYURIA        ICD-9: 788.42   2015      Active

 

                CAD             ICD-9: 414.00   09/10/2015      Active

 

                Hyperglycemia   ICD-9: 790.29   2012      Active

 

                HYPERLIPIDEMIA NEC/NOS ICD-9: 272.4    09/10/2015      Active

 

                ABDOMINAL PAIN  ICD-9: 789.00   10/10/2012      Active

 

                Grieving        ICD-9: 309.0    2015      Active

 

                HYPOGLYCEMIA    ICD-9: 251.2    2012      Active

 

                MALAISE AND FATIGUE ICD-9: 780.79   2015      Active

 

                CERUMEN IMPACTION ICD-9: 380.4    2015      Active

 

                Leg pain        ICD-9: 729.5    2015      Active

 

                SUPERFIC PHLEBITIS-LEG ICD-9: 451.0    2015      Active

 

                SPASM OF MUSCLE ICD-9: 728.85   2015      Active

 

                Thoracic back pain ICD-9: 724.1    2015      Active

 

                Benign positional vertigo ICD-9: 386.11   2015      Active

 

                Suspicious nevus ICD-9: 238.2    2015      Active

 

                EUSTACHIAN TUBE DYSFUNCTION ICD-9: 381.81   2014      Acti

ve

 

                SINUSITIS, ACUTE ICD-9: 461.9    2014      Active

 

                DIZZINESS/VERTIGO ICD-9: 780.4    2014      Active

 

                HYPERTENSION    ICD-9: 401.9    2014      Active

 

                URI, ACUTE      ICD-9: 465.9    10/15/2013      Active

 

                CEPHALGIA       ICD-9: 784.0    2013      Active

 

                OTITIS MEDIA NOS ICD-9: 382.9    2013      Active

 

                Perforation of ear drum ICD-9: 384.20   05/10/2013      Active

 

                Mastalgia       ICD-9: 611.71   2013      Active

 

                DYSPEPSIA       ICD-9: 536.8    10/10/2012      Active

 

                IBS             ICD-9: 564.1    10/10/2012      Active

 

                FLU VACCINE     ICD-9: V04.81   2012      Active

 

                Radiculopathy of leg ICD-9: 724.4    2012      Active

 

                SCIATICA        ICD-9: 724.3    2012      Active

 

                Lumbar degenerative disc disease ICD-9: 722.52   2012     

 Active

 

                Sacroiliac dysfunction ICD-9: 739.4    2012      Active

 

                Hypertension    Unknown         2012      Active

 

                PAIN, LOWER BACK ICD-9: 724.2    2011      Active

 

                SPINAL ENTHESOPATHY ICD-9: 720.1    2011      Active

 

                Hypotension     ICD-9: 458.9    2011      Active

 

                SACROILIITIS NEC ICD-9: 720.2    2011      Active

 

                PHARYNGITIS, ACUTE ICD-9: 462      2010      Active

 

                URINARY TRACT INFECTION ICD-9: 599.0    2010      Active

 

                Heat exhaustion ICD-9: 992.5    2010      Active

 

                Esophagitis     ICD-9: 530.10   2010      Active

 

                Gastritis       ICD-9: 535.50   2010      Active

 

                GERD            ICD-9: 530.81   2010      Active

 

                Hiatal hernia   ICD-9: 553.3    2010      Active

 

                B12 DEFIC ANEMIA NEC ICD-9: 281.1    2010      Active

 

                Anxiety         Unknown         2010      Active

 

                Heart disease   Unknown         2010      Active

 

                Hyperlipidemia  Unknown         2010      Active

 

                ANXIETY STATE NOS ICD-9: 300.00   2010      Active

 

                DEPRESSIVE DISORDER NEC ICD-9: 311      2010      Active







Medications





          Medication Codes     Instructions Start Date Stop Date Status    Fill 

Instructions

 

                    Flonase Allergy Relief 50 mcg/actuation nasal spray,suspensi

on RxNorm: 4184315     2

Spray Nasal every night at bedtime 2020      Inactive     

    

 

           simvastatin 40 mg tablet RxNorm: 099833 1 Tablet(s) PO QD 2019 

02/15/2020 

Active                                   

 

                omeprazole 40 mg capsule,delayed release RxNorm: 332660  1 Capsu

le(s) Oral QD 

2019          Active               

 

                Xanax 0.25 mg tablet RxNorm: 938878  TAKE 1 TABLET BY MOUTH TWIC

E DAILY 

10/29/2019          No Stop Date        Active               

 

                omeprazole 40 mg capsule,delayed release RxNorm: 566187  1 Capsu

le(s) PO QD 

10/07/2019          2019          Inactive             

 

             metoprolol tartrate 25 mg tablet RxNorm: 208118 1 Tablet(s) PO BID 

2019                Active                     

 

                omeprazole 40 mg capsule,delayed release RxNorm: 313655  1 Capsu

le(s) PO QD 

2019          10/06/2019          Inactive             

 

                    Flonase Allergy Relief 50 mcg/actuation nasal spray,suspensi

on RxNorm: 5196674     2

[facility] NASAL QHS 2019      Inactive         

 

           simvastatin 40 mg tablet RxNorm: 353156 1 Tablet(s) PO QD 2019 

Inactive                                 

 

           simvastatin 40 mg tablet RxNorm: 077947 1 Tablet(s) PO QD 2019 

Inactive                                 

 

                omeprazole 40 mg capsule,delayed release RxNorm: 740041  1 Capsu

le(s) PO QD 

2019          Inactive             

 

           simvastatin 40 mg tablet RxNorm: 798748 1 Tablet(s) PO QD 2019 

Inactive                                 

 

                omeprazole 40 mg capsule,delayed release RxNorm: 993000  1 Capsu

le(s) PO QD 

2019          Inactive             

 

             Bactrim  mg-160 mg tablet RxNorm: 341321 1 Tablet(s) PO BID 0

3/08/2019   

2019                Inactive                   

 

             Bactrim  mg-160 mg tablet RxNorm: 962001 1 Tablet(s) PO BID 0

3/08/2019   

2019                Inactive                   

 

             Macrobid 100 mg capsule RxNorm: 130358 1 Capsule(s) PO BID 20

19   2019

                          Inactive                   

 

             metoprolol tartrate 25 mg tablet RxNorm: 074669 1 Tablet(s) PO BID 

2019                Inactive                   

 

                Xanax 0.25 mg tablet RxNorm: 911169  TAKE 1 TABLET BY MOUTH TWIC

E DAILY 

2018          10/28/2019          Inactive             

 

           Xanax 0.25 mg tablet RxNorm: 670931 1 Tablet(s) PO BID 2018 

Inactive                                 

 

                    Protonix 40 mg tablet,delayed release RxNorm: 626393      1 

Tablet(s) PO BID for 

stomach--replaces omeprazole 2018      Inactive         

 

             Carafate 1 gram tablet RxNorm: 528164 1 Tablet(s) PO AC & HS 2019                Inactive                   

 

           Xanax 0.25 mg tablet RxNorm: 745168 1 Tablet(s) PO BID 10/30/2018 12/

10/2018 

Inactive                                 

 

             Bactrim  mg-160 mg tablet RxNorm: 111459 1 Tablet(s) PO BID 1

   

10/08/2018                Inactive                   

 

             Bactrim  mg-160 mg tablet RxNorm: 420203 1 Tablet(s) PO BID 1

   

10/03/2018                Inactive                   

 

             Carafate 1 gram tablet RxNorm: 527620 1 Tablet(s) PO AC & HS 09/18/

2018   

10/17/2018                Inactive                   

 

                    Protonix 40 mg tablet,delayed release RxNorm: 380959      1 

Tablet(s) PO BID for 

stomach--replaces omeprazole 2018      Inactive         

 

                    Protonix 40 mg tablet,delayed release RxNorm: 834780      1 

Tablet(s) PO BID for 

stomach--replaces omeprazole 2018      Inactive         

 

                    fluticasone 50 mcg/actuation nasal spray,suspension RxNorm: 

4977048     2 Spray 

NASAL QD to each nostril 2018      Inactive         

 

             Nasonex 50 mcg/actuation Spray RxNorm: 2943215 2 [facility] NASAL 2018                Inactive                   

 

                omeprazole 40 mg capsule,delayed release RxNorm: 729837  1 Capsu

le(s) PO QD 

2018          08/15/2018          Inactive             

 

                    simvastatin 40 mg tablet RxNorm: 435751      1 Tablet(s) PO 

QD TAKE 1 TABLET EVERY 

DAY             2018      Inactive         

 

             metoprolol tartrate 25 mg tablet RxNorm: 060424 1/2 Tablet(s) PO QD

 2018                Inactive                   

 

                simvastatin 40 mg tablet RxNorm: 312786  Tablet(s) TAKE 1 TABLET

 EVERY DAY 

2017          Inactive             

 

             metoprolol tartrate 25 mg tablet RxNorm: 555504 1/2 Tablet(s) PO QD

 2017                Inactive                   

 

                    Flonase 50 mcg/actuation nasal spray,suspension RxNorm: 1797

933     2 Clancy NASAL 

BID             2017      Inactive         

 

                omeprazole 40 mg capsule,delayed release RxNorm: 357342  1 Capsu

le(s) PO QD 

2017          Inactive             

 

             metoprolol tartrate 25 mg tablet RxNorm: 467152 1/2 Tablet(s) PO QD

 04/10/2017   

2017                Inactive                   

 

                    ciprofloxacin 0.2 % ear drops in a dropperette RxNorm: 94132

6      4 Drop(s) OTIC TID

for 1 week      2016      Inactive         

 

           cefdinir 300 mg capsule RxNorm: 181214 2 Capsule(s) PO QD 2016 

Inactive                                 

 

                    omeprazole 40 mg capsule,delayed release RxNorm: 555573     

 TAKE 1 CAPSULE EVERY DAY

                2016      Inactive         

 

             simvastatin 40 mg tablet RxNorm: 600824 TAKE 1 TABLET EVERY DAY 2017                Inactive                   

 

                    Flonase 50 mcg/actuation nasal spray,suspension RxNorm: 1797

933     2 [facility]ancy NASAL 

BID             2015      Inactive         

 

             cefuroxime axetil 250 mg tablet RxNorm: 721545 1 Tablet(s) PO BID 0

2015                Inactive                   

 

             cefuroxime axetil 250 mg tablet RxNorm: 611869 1 Tablet(s) PO BID 0

2015                Inactive                   

 

                omeprazole 40 mg capsule,delayed release RxNorm: 366144  1 Capsu

le(s) PO QD 

2015          Inactive             

 

           meloxicam 7.5 mg tablet RxNorm: 316390 1 Tablet(s) PO QD 2015 0

2015 

Inactive                                 

 

                loratadine 10 mg tablet RxNorm: 019076  1 Tablet(s) PO QAM for a

llergies 

2014          Inactive             

 

                    Flonase 50 mcg/actuation nasal spray,suspension RxNorm: 8963

23      2 [facility] NASAL BID

                2014      12/15/2015      Inactive         

 

           prednisone 20 mg tablet RxNorm: 516257 2 Tablet(s) PO BID 2014 

Inactive                                 

 

             Dyazide 37.5 mg-25 mg capsule RxNorm: 227836 1 Capsule(s) PO QAM 2014                Inactive                   

 

           Ceftin 500 mg tablet RxNorm: 757305 1 Tablet(s) PO BID 2014 

Inactive                                 

 

           Ceftin 500 mg tablet RxNorm: 691778 1 Tablet(s) PO BID 2014 

Inactive                                 

 

                    simvastatin 40 mg tablet RxNorm: 235081      Tablet(s) PO TA

KE ONE TABLET BY MOUTH 

EVERY DAY       2014      Inactive         

 

                    metoprolol tartrate 25 mg tablet RxNorm: 528903      Tablet(

s) PO TAKE ONE-HALF 

TABLET BY MOUTH EVERY DAY 2014      Inactive         

 

           Ceftin 500 mg tablet RxNorm: 543299 1 Tablet(s) PO BID 10/15/2013 10/

 

Inactive                                 

 

                loratadine 10 mg tablet RxNorm: 502309  1 Tablet(s) PO QAM for a

llergies 

2013          Inactive             

 

                    omeprazole 20 mg capsule,delayed release RxNorm: 938652     

 Capsule(s) PO TAKE ONE 

CAPSULE BY MOUTH TWICE DAILY 2013      Inactive         

 

                omeprazole 20 mg capsule,delayed release RxNorm: 060049  1 Capsu

le(s) PO BID 

2013          Inactive             

 

             Augmentin 875 mg-125 mg tablet RxNorm: 186675 1 Tablet(s) PO Q12H 0

5/10/2013   

2013                Inactive                   

 

             Floxin Otic Drops 1 bottle Drops RxNorm:      5 Drop(s) OTIC BID 05

/10/2013   

2013                Inactive                   

 

                    metoprolol tartrate 25 mg tablet RxNorm: 953858      Tablet(

s) PO TAKE ONE-HALF 

TABLET BY MOUTH EVERY DAY 2013      Inactive         

 

                    simvastatin 40 mg tablet RxNorm: 231890      Tablet(s) PO TA

KE ONE TABLET BY MOUTH 

EVERY DAY       2013      Inactive         

 

                lancets         RxNorm:         Misc Miscellaneous USE ONE TO CH

YOGI GLUCOSE EVERY DAY 

2013          Inactive             

 

             Carafate 1 gram tablet RxNorm: 891114 1 Tablet(s) PO AC & HS 2015                Inactive                   

 

           Flagyl 500 mg tablet RxNorm: 722722 1 Tablet(s) PO TID 10/10/2012 10/

 

Inactive                                 

 

             Carafate 1 gram tablet RxNorm: 256114 1 Tablet(s) PO AC & HS 10/10/

2012   

2012                Inactive                   

 

          One Touch Test strips RxNorm:   Miscellaneous QD 2012

 Inactive  

one touch ultra mini test strips

 

           Protonix 40 mg Tab RxNorm: 915414 1 Tablet(s) PO QD 2012 

Inactive                                 

 

           Protonix 40 mg Tab RxNorm: 045035 1 Tablet(s) PO QD 2012 10/09/

2012 

Inactive                                 

 

           prednisone 20 mg Tab RxNorm: 907407 1 Tablet(s) PO BID 2012 

Inactive                                 

 

             Flexeril 5 mg Tab RxNorm: 372159 1 Tablet(s) PO QHS for spasm 2012                Inactive                   

 

             Flexeril 5 mg Tab RxNorm: 083871 1 Tablet(s) PO QHS for spasm 2012                Inactive                   

 

                amlodipine 2.5 mg Tab RxNorm: 784436  1/2 Tablet(s) PO QD replac

es 5mg dose 

2012          Inactive             

 

           simvastatin 40 mg tablet RxNorm: 302792 1 Tablet(s) PO QD 2012 

Inactive                                 

 

             metoprolol tartrate 25 mg tablet RxNorm: 886114 1/2 Tablet(s) PO QD

 2012                Inactive                   

 

                omeprazole 20 mg capsule,delayed release RxNorm: 956452  1 Capsu

le(s) PO BID 

2012          Inactive             

 

           cefdinir 300 mg Cap RxNorm: 362046 2 Capsule(s) PO QD 2011 

Inactive                                 

 

           simvastatin 40 mg Tab RxNorm: 024711 1 Tablet(s) PO QD 2011 

Inactive                                 

 

             metoprolol tartrate 25 mg Tab RxNorm: 061025 1/2 Tablet(s) PO QD 10

/   

2012                Inactive                   

 

             metoprolol tartrate 25 mg Tab RxNorm: 940770 1/2 Tablet(s) PO QD 10

/   

10/24/2011                Inactive                   

 

           meclizine 25 mg Tab RxNorm: 6756208 1 Tablet(s) PO QHS 2011 

Inactive                                for dizziness

 

           Ceftin 500 mg Tab RxNorm: 228940 1 Tablet(s) PO BID 2011 

Inactive                                 

 

                omeprazole 20 mg Cap, Delayed Release RxNorm: 171122  1 Capsule(

s) PO BID 

2011          10/24/2011          Inactive             

 

           simvastatin 40 mg Tab RxNorm: 009337 1 Tablet(s) PO QD 2011 

Inactive                                 

 

           simvastatin 40 mg Tab RxNorm: 154991 1 Tablet(s) PO QD 2011 

Inactive                                 

 

           simvastatin 40 mg Tab RxNorm: 261154 1 Tablet(s) PO QD 2010 

Inactive                                 

 

             Tessalon Perles 100 mg Cap RxNorm: 358250 1 Capsule(s) PO Q6-8H 2010                Inactive                   

 

           cefdinir 300 mg Cap RxNorm: 500996 1 Capsule(s) PO BID 2010 

Inactive                                 

 

           Lexapro 10 mg Tab RxNorm: 112254 1 Tablet(s) PO QD 2010

010 

Inactive                                 

 

           Cipro 250 mg Tab RxNorm: 657621 1 Tablet(s) PO BID 2010 10/04/2

010 

Inactive                                 

 

             Wellbutrin  mg 24 hr Tab RxNorm: 481071 1 Tablet(s) PO QAM 2010                Inactive                   

 

          Fish Oil 1,000 mg Cap RxNorm:   1 Capsule(s) PO QD No Start Date      

     Active     

 

                Tylenol Extra Strength 500 mg tablet RxNorm: 441337  1/2 Tablet(

s) PO as needed 

No Start Date                           Active               

 

                nitroglycerin 0.4 mg sublingual tablet RxNorm: 255003  Tablet(s)

 SL as needed No 

Start Date                              Active               

 

                    Calcium with Vitamin D 600 mg (1,500 mg)-400 unit tablet RxN

orm: 643885      1 

Tablet(s) PO QD No Start Date                   Active           

 

           Multivitamin & Mineral Formula Tab RxNorm:    1 Tablet(s) PO QD No St

art Date            

Active                                   

 

          vitamin B complex capsule RxNorm:   1 Capsule(s) PO QD No Start Date  

         Active     

 

          Aspirin 81 mg Tab RxNorm: 312941 1 Tablet(s) PO QD No Start Date      

     Active     

 

                    isosorbide mononitrate ER 30 mg tablet,extended release 24 h

r RxNorm: 516972      1 

Tablet(s) PO QHS No Start Date                   Active           

 

           Vitamin D 1,000 unit Cap RxNorm: 402665 1 Capsule(s) PO QD No Start D

ate            

Active                                   

 

          Co Q-10 oral RxNorm: 81263 oral      No Start Date           Active   

  

 

             metoprolol tartrate 25 mg Tab RxNorm: 672525 1/2 Tablet(s) PO QD No

 Start Date 

10/23/2011                Inactive                   

 

             Nasonex 50 mcg/actuation Spray RxNorm: 5152445 2 Spray NASAL No Sta

rt Date 

2018                Inactive                   

 

           Vitamin B12 1000mcg Tablet RxNorm:    1 Tablet(s) PO QD No Start Date

 2015 

Inactive                                 

 

           amlodipine 5 mg Tab RxNorm: 660208 1 Tablet(s) PO QD No Start Date  

Inactive                                 

 

             simvastatin 40 mg tablet RxNorm: 864406 1 Tablet(s) PO QD No Start 

Date 

2019                Inactive                   

 

                omeprazole 20 mg capsule,delayed release RxNorm: 299385  1 Capsu

le(s) PO BID No 

Start Date          2013          Inactive             

 

                omeprazole 40 mg capsule,delayed release RxNorm: 438967  1 Capsu

le(s) PO QD No 

Start Date          2019          Inactive             

 

           Nexium 40 mg Cap RxNorm: 404044 1 Capsule(s) PO QD No Start Date  

Inactive                                 

 

             Stool Softener 100 mg Tab RxNorm: 2379901 2 Tablet(s) PO BID No Sta

rt Date 

2012                Inactive                   

 

                    Calcium with Vitamin D 600 mg (1,500 mg)-400 unit Tab RxNorm

: 096863      1 Tablet(s)

PO QD           No Start Date   2015      Inactive         

 

             iron 325 mg (65 mg iron) Tab RxNorm: 312170 1 Tablet(s) PO QD No St

art Date 

2015                Inactive                   

 

                Flonase 50 mcg/actuation Nasal Spray RxNorm: 096602  2 Clancy ROZ

AL BID No Start 

Date                2014          Inactive             

 

                    fluticasone 50 mcg/actuation nasal spray,suspension RxNorm: 

0685398     2 Spray 

NASAL QD to each nostril No Start Date   2018      Inactive         

 

             Nasonex 50 mcg/actuation Spray RxNorm: 8952734 2 Spray NASAL QD No 

Start Date 

2018                Inactive                   

 

                    hydralazine 50 mg tablet RxNorm: 175623      1 Tablet(s) PO 

as needed for BP over 

160/90          No Start Date   2018      Inactive         

 

             Vimovo 500 mg-20 mg 12 hr Tab RxNorm: 463007 1 Tablet(s) PO BID No 

Start Date 

2011                Inactive                   

 

             Tylenol PM 25 mg-500 mg/15 mL Oral Soln RxNorm: 6169947 1 PO QPM   

  No Start Date 

2015                Inactive                   

 

          Iron (Ferrous Sulfate) Oral RxNorm:   Oral      No Start Date 20

12 Inactive   

 

             Reglan 10 mg Tab RxNorm: 536740 1 Tablet(s) PO TID before meals No 

Start Date 

2011                Inactive                   

 

           Xanax 1 mg Tab RxNorm: 507149 1/2 Tablet(s) PO QD No Start Date  

Inactive                                 

 

             amlodipine 10 mg tablet RxNorm: 613574 1 Tablet(s) PO QD No Start D

ate 

2014                Inactive                   

 

          Iron (dried) Oral RxNorm:   Oral      No Start Date 2012 Inactiv

e   

 

                metoprolol tartrate 50 mg tablet RxNorm: 908674  1 Tablet(s) PO 

BID No Start Date

                    12/10/2018          Inactive             

 

           Xanax 0.25 mg tablet RxNorm: 709136 1 Tablet(s) PO BID No Start Date 

10/29/2018 

Inactive                                 

 

                lancets         RxNorm:         Miscellaneous check blood sugar 

at least once daily No Start 

Date                2013          Inactive             

 

           simvastatin 40 mg Tab RxNorm: 295627 1 Tablet(s) PO QD No Start Date 

2010 

Inactive                                 

 

                    isosorbide mononitrate ER 30 mg tablet,extended release 24 h

r RxNorm: 128562      1 

Tablet(s) PO QHS No Start Date   2019      Inactive         

 

             amlodipine 2.5 mg tablet RxNorm: 130328 1 Tablet(s) PO QD No Start 

Date 

2014                Inactive                   

 

             sucralfate 1 gram tablet RxNorm: 413790 1 Tablet(s) PO QID No Start

 Date 

2019                Inactive                   

 

          Fish Oil Oral RxNorm:   Oral      No Start Date 2012 Inactive   

 

          Multiple Vitamin Oral RxNorm:   Oral      No Start Date 2012 [facility]

ctive   

 

                metoprolol tartrate 25 mg tablet RxNorm: 123902  1/2 Tablet(s) P

O QD No Start 

Date                2017          Inactive             

 

                metoprolol tartrate 50 mg tablet RxNorm: 888573  1/2 Tablet(s) P

O BID No Start 

Date                2019          Inactive             

 

                    Flonase Allergy Relief 50 mcg/actuation nasal spray,suspensi

on RxNorm: 2748453     2

Clancy NASAL QHS No Start Date   2019      Inactive         







Medication Administered

No Medication Administered data



Immunizations





                Vaccine         Codes           Date            Status

 

                Influenza       CVX: 135        10/22/2019      Complete

 

                Influenza       CVX: 135        10/22/2019      Complete

 

                Influenza       CVX: 135        2018      Complete

 

                Influenza       CVX: 135        10/06/2017      Complete

 

                Influenza       CVX: 135        10/07/2016      Complete

 

                Influenza       CVX: 135        10/16/2015       

 

                Influenza       CVX: 141        2013       

 

                Influenza (Adult) CVX: 141        10/06/2010       







Results





          Observation Observation Code Item      Item Code Result    Date      S

ervice Location

 

          GFR CALC  9563191   GFR Non Afr Amr           52 mL/min 10/11/2019 Unk

nown

 

          GFR CALC  4000337   GFR Afr Amr           >60 mL/min 10/11/2019 Unknow

n

 

          COMPREHENSIVE METABOLIC 43197     AST                 17 U/L    10/11/

2019 Unknown

 

          COMPREHENSIVE METABOLIC 29712     ALT                 11 U/L    10/11/

2019 Unknown

 

          COMPREHENSIVE METABOLIC 83368     BUN                 17 mg/dL  10/11/

2019 Unknown

 

          COMPREHENSIVE METABOLIC 03607     ALBUMIN             3.9 g/dL  10/11/

2019 Unknown

 

          COMPREHENSIVE METABOLIC 01937     CHLORIDE            108 mmol/L 10/11

/2019 Unknown

 

          COMPREHENSIVE METABOLIC 13781     Bili Total           0.5 mg/dL 10/11

/2019 Unknown

 

          COMPREHENSIVE METABOLIC 56382     ALK PHOS            72 U/L    10/11/

2019 Unknown

 

          COMPREHENSIVE METABOLIC 44547     SODIUM              142 mmol/L 10/11

/2019 Unknown

 

          COMPREHENSIVE METABOLIC 94549     CREATININE           1.02 mg/dL 10/1

2019 Unknown

 

          COMPREHENSIVE METABOLIC 71153     CALCIUM             9.3 mg/dL 10/11/

2019 Unknown

 

          COMPREHENSIVE METABOLIC 73337     POTASSIUM           4.0 mmol/L 10/11

/2019 Unknown

 

          COMPREHENSIVE METABOLIC 20186     Total Protein           6.2 g/dL  10

/2019 Unknown

 

          COMPREHENSIVE METABOLIC 49381     Glucose             93 mg/dL  10/11/

2019 Unknown

 

          COMPREHENSIVE METABOLIC 19865     Bicarbonate           27 mmol/L 10/1

2019 Unknown

 

          COMPREHENSIVE METABOLIC 89622     AGAP                7 mmol/L  10/11/

2019 Unknown

 

          GFR CALC  7140876   GFR Non Afr Amr           48 mL/min 06/10/2019 Unk

nown

 

          GFR CALC  4881901   GFR Afr Amr           59 mL/min 06/10/2019 Unknown

 

          THYROID STIMULATING HORMONE 92344     TSH                 1.936 uIU/mL

 06/10/2019 Unknown

 

          COMPREHENSIVE METABOLIC 15471     AST                 18 U/L    06/10/

2019 Unknown

 

          COMPREHENSIVE METABOLIC 95783     ALT                 11 U/L    06/10/

2019 Unknown

 

          COMPREHENSIVE METABOLIC 89671     BUN                 18 mg/dL  06/10/

2019 Unknown

 

          COMPREHENSIVE METABOLIC 73787     ALBUMIN             4.2 g/dL  06/10/

2019 Unknown

 

          COMPREHENSIVE METABOLIC 54558     CHLORIDE            109 mmol/L 06/10

/2019 Unknown

 

          COMPREHENSIVE METABOLIC 66465     Bili Total           0.4 mg/dL 06/10

/2019 Unknown

 

          COMPREHENSIVE METABOLIC 20910     ALK PHOS            64 U/L    06/10/

2019 Unknown

 

          COMPREHENSIVE METABOLIC 00734     SODIUM              142 mmol/L 06/10

/2019 Unknown

 

          COMPREHENSIVE METABOLIC 98157     CREATININE           1.09 mg/dL  Unknown

 

          COMPREHENSIVE METABOLIC 68282     CALCIUM             9.3 mg/dL 06/10/

2019 Unknown

 

          COMPREHENSIVE METABOLIC 01379     POTASSIUM           4.7 mmol/L 06/10

/2019 Unknown

 

          COMPREHENSIVE METABOLIC 30761     Total Protein           6.3 g/dL  06

/10/2019 Unknown

 

          COMPREHENSIVE METABOLIC 73155     Glucose             84 mg/dL  06/10/

2019 Unknown

 

          COMPREHENSIVE METABOLIC 74602     Bicarbonate           25 mmol/L  Unknown

 

          COMPREHENSIVE METABOLIC 67180     AGAP                8 mmol/L  06/10/

2019 Unknown

 

          COMPLETE BLOOD COUNT [id number]   WBC                 7.8 10e9/L 06/10/20

19 Unknown

 

          COMPLETE BLOOD COUNT [id number]   RBC                 4.04 10e12/L 06/10/

2019 Unknown

 

          COMPLETE BLOOD COUNT [id number]   HEMOGLOBIN           12.2 g/dL 06/10/20

19 Unknown

 

          COMPLETE BLOOD COUNT [id number]   HEMATOCRIT           38.6 %    06/10/20

19 Unknown

 

          COMPLETE BLOOD COUNT [id number]   MCV                 95.5 fL   06/10/201

9 Unknown

 

          COMPLETE BLOOD COUNT [id number]   MCH                 30.2 pg   06/10/201

9 Unknown

 

          COMPLETE BLOOD COUNT [id number]   MCHC                31.6 g/dL 06/10/201

9 Unknown

 

          COMPLETE BLOOD COUNT [id number]   PLATELET COUNT           215 10e9/L 06/

10/2019 Unknown

 

          COMPLETE BLOOD COUNT [id number]   Mean Plt Volume           11.2 fL   06/

10/2019 Unknown

 

          COMPLETE BLOOD COUNT [id number]   Neut Auto           45.7 %    06/10/201

9 Unknown

 

          COMPLETE BLOOD COUNT [id number]   Lymph Auto           42.1 %    06/10/20

19 Unknown

 

          COMPLETE BLOOD COUNT [id number]   Mono Auto           9.2 %     06/10/201

9 Unknown

 

          COMPLETE BLOOD COUNT [id number]   RDW                 13.4 %    06/10/201

9 Unknown

 

          COMPLETE BLOOD COUNT [id number]   Eos Auto            2.7 %     06/10/201

9 Unknown

 

          COMPLETE BLOOD COUNT [id number]   Baso Auto           0.3 %     06/10/201

9 Unknown

 

          COMPLETE BLOOD COUNT [id number]   Neutrophil Abs           3.56 10e9/L 06

/10/2019 Unknown

 

          COMPLETE BLOOD COUNT [id number]   Lymphocyte Abs           3.28 10e9/L 06

/10/2019 Unknown

 

          COMPLETE BLOOD COUNT [id number]   Monocyte Abs           0.72 10e9/L  Unknown

 

          COMPLETE BLOOD COUNT [id number]   Eosinophil Abs           0.21 10e9/L 06

/10/2019 Unknown

 

          COMPLETE BLOOD COUNT [id number]   RDW-SD              44.9 fL   06/10/201

9 Unknown

 

          COMPLETE BLOOD COUNT [id number]   Basophil Abs           0.02 10e9/L  Unknown

 

          COMPLETE BLOOD COUNT [id number]   WBC                 5.2 10e9/L 20

18 Unknown

 

          COMPLETE BLOOD COUNT [id number]   RBC                 4.21 10e12/L 2018 Unknown

 

          COMPLETE BLOOD COUNT [id number]   HEMOGLOBIN           12.8 g/dL 20

18 Unknown

 

          COMPLETE BLOOD COUNT [id number]   HEMATOCRIT           39.0 %    20

18 Unknown

 

          COMPLETE BLOOD COUNT [id number]   MCV                 92.6 fL   

8 Unknown

 

          COMPLETE BLOOD COUNT [id number]   MCH                 30.4 pg   

8 Unknown

 

          COMPLETE BLOOD COUNT [id number]   MCHC                32.8 g/dL 

8 Unknown

 

          COMPLETE BLOOD COUNT [id number]   PLATELET COUNT           202 10e9/L  Unknown

 

          COMPLETE BLOOD COUNT [id number]   Mean Plt Volume           10.7 fL    Unknown

 

          COMPLETE BLOOD COUNT [id number]   Neut Auto           40.9 %    

8 Unknown

 

          COMPLETE BLOOD COUNT [id number]   Lymph Auto           46.3 %    20

18 Unknown

 

          COMPLETE BLOOD COUNT [id number]   Mono Auto           9.3 %     

8 Unknown

 

          COMPLETE BLOOD COUNT [id number]   RDW                 13.7 %    

8 Unknown

 

          COMPLETE BLOOD COUNT [id number]   Eos Auto            2.9 %     

8 Unknown

 

          COMPLETE BLOOD COUNT [id number]   Baso Auto           0.6 %     

8 Unknown

 

          COMPLETE BLOOD COUNT [id number]   Neutrophil Abs           2.13 10e9/L  Unknown

 

          COMPLETE BLOOD COUNT [id number]   Lymphocyte Abs           2.41 10e9/L  Unknown

 

          COMPLETE BLOOD COUNT [id number]   Monocyte Abs           0.48 10e9/L  Unknown

 

          COMPLETE BLOOD COUNT [id number]   Eosinophil Abs           0.15 10e9/L  Unknown

 

          COMPLETE BLOOD COUNT [id number]   RDW-SD              45.2 fL   

8 Unknown

 

          COMPLETE BLOOD COUNT [id number]   Basophil Abs           0.03 10e9/L  Unknown

 

          [place] [address]     Glucose             118 mg/dL  Unknown

 

          [place] [address]     CREATININE           1.01 mg/dL  Unknown

 

          [place] [address]     BUN                 14 mg/dL   Unknown

 

          [place] [address]     SODIUM              141 mmol/L  Unknown

 

          [place] [address]     POTASSIUM           4.0 mmol/L  Unknown

 

          [place] [address]     CHLORIDE            108 mmol/L  Unknown

 

          [place] [address]     Bicarbonate           25 mmol/L  Unknown

 

          [place] [address]     AGAP                8 mmol/L   Unknown

 

          [place] [address]     CALCIUM             9.6 mg/dL  Unknown

 

          FREE T4   02688     T4 Free             1.23 ng/dL 2018 Unknown

 

          GFR CALC  5135293   GFR Non Afr Amr           53 mL/min 2018 Unk

nown

 

          GFR CALC  1624712   GFR Afr Amr           >60 mL/min 2018 Unknow

n

 

          THYROID STIMULATING HORMONE 82784     TSH                 2.124 uIU/mL

 2018 Unknown

 

          LIPID GROUP [id number]     Cholesterol           152 mg/dL 2018 Unkno

wn

 

          LIPID GROUP [id number]     Triglyceride           151 mg/dL 2018 Unkn

own

 

          LIPID GROUP [id number]     HDL CHOLESTEROL           47 mg/dL  2018 U

nknown

 

          LIPID GROUP [id number]     Chol/HDL Ratio           3.23 ratio 2018 U

nknown

 

          LIPID GROUP 97976     NON-HDL Chol           105 mg/dL 2018 Unkn

own

 

          LIPID GROUP 73303     LDL Cholesterol           75 mg/dL  2018 U

nknown

 

          ASSAY OF TROPONIN QUANT 41078     Troponin-I           <0.30 ng/mL  Unknown

 

          COMPREHENSIVE METABOLIC 07789     AST                 20 U/L    2018 Unknown

 

          COMPREHENSIVE METABOLIC 17690     ALT                 14 U/L    2018 Unknown

 

          COMPREHENSIVE METABOLIC 56924     BUN                 19 mg/dL  2018 Unknown

 

          COMPREHENSIVE METABOLIC 72886     ALBUMIN             4.2 g/dL  2018 Unknown

 

          COMPREHENSIVE METABOLIC 40849     CHLORIDE            102 mmol/L  Unknown

 

          COMPREHENSIVE METABOLIC 72600     Bili Total           0.4 mg/dL  Unknown

 

          COMPREHENSIVE METABOLIC 67193     ALK PHOS            66 U/L    2018 Unknown

 

          COMPREHENSIVE METABOLIC 05069     SODIUM              135 mmol/L  Unknown

 

          COMPREHENSIVE METABOLIC 01117     CREATININE           1.01 mg/dL  Unknown

 

          COMPREHENSIVE METABOLIC 98815     CALCIUM             9.3 mg/dL 2018 Unknown

 

          COMPREHENSIVE METABOLIC 59694     POTASSIUM           4.8 mmol/L  Unknown

 

          COMPREHENSIVE METABOLIC 84858     Total Protein           7.0 g/dL   Unknown

 

          COMPREHENSIVE METABOLIC 82786     Glucose             91 mg/dL  2018 Unknown

 

          COMPREHENSIVE METABOLIC 61634     Bicarbonate           23 mmol/L  Unknown

 

          COMPREHENSIVE METABOLIC 72244     AGAP                10 mmol/L 2018 Unknown

 

          COMPLETE BLOOD COUNT [id number]   WBC                 7.5 10e9/L 20

18 Unknown

 

          COMPLETE BLOOD COUNT [id number]   RBC                 4.13 10e12/L 2018 Unknown

 

          COMPLETE BLOOD COUNT [id number]   HEMOGLOBIN           12.6 g/dL 20

18 Unknown

 

          COMPLETE BLOOD COUNT [id number]   HEMATOCRIT           38.4 %    20

18 Unknown

 

          COMPLETE BLOOD COUNT [id number]   MCV                 93.0 fL   

8 Unknown

 

          COMPLETE BLOOD COUNT [id number]   MCH                 30.5 pg   

8 Unknown

 

          COMPLETE BLOOD COUNT [id number]   MCHC                32.8 g/dL 

8 Unknown

 

          COMPLETE BLOOD COUNT [id number]   PLATELET COUNT           204 10e9/L  Unknown

 

          COMPLETE BLOOD COUNT [id number]   Mean Plt Volume           10.9 fL    Unknown

 

          COMPLETE BLOOD COUNT [id number]   Neut Auto           43.1 %    

8 Unknown

 

          COMPLETE BLOOD COUNT [id number]   Lymph Auto           45.0 %    20

18 Unknown

 

          COMPLETE BLOOD COUNT [id number]   Mono Auto           9.2 %     

8 Unknown

 

          COMPLETE BLOOD COUNT [id number]   RDW                 13.4 %    

8 Unknown

 

          COMPLETE BLOOD COUNT [id number]   Eos Auto            2.3 %     

8 Unknown

 

          COMPLETE BLOOD COUNT [id number]   Baso Auto           0.4 %     

8 Unknown

 

          COMPLETE BLOOD COUNT [id number]   Neutrophil Abs           3.23 10e9/L  Unknown

 

          COMPLETE BLOOD COUNT [id number]   Lymphocyte Abs           3.38 10e9/L  Unknown

 

          COMPLETE BLOOD COUNT [id number]   Monocyte Abs           0.69 10e9/L  Unknown

 

          COMPLETE BLOOD COUNT [id number]   Eosinophil Abs           0.17 10e9/L  Unknown

 

          COMPLETE BLOOD COUNT [id number]   RDW-SD              44.4 fL   

8 Unknown

 

          COMPLETE BLOOD COUNT [id number]   Basophil Abs           0.03 10e9/L  Unknown

 

          GFR CALC  9020823   GFR Non Afr Amr           53 mL/min 2018 Unk

nown

 

          GFR CALC  2407053   GFR Afr Amr           >60 mL/min 2018 Unknow

n

 

          GLYCOSYLATED HEMOGLOBIN TEST 09966     Hgb A1c   75635-8   5.4 %     0

2018 Unknown

 

          MEAN GLUC 1074492   Calc Mean Gluc           108 mg/dL 2018 Unkn

own

 

          MEAN GLUC 2903253   Calc Mean Gluc           114 mg/dL 2017 Unkn

own

 

          LIPID GROUP [id number]     Cholesterol           146 mg/dL 2017 Unkno

wn

 

          LIPID GROUP 02962     Triglyceride           119 mg/dL 2017 Unkn

own

 

          LIPID GROUP 66758     HDL CHOLESTEROL           47 mg/dL  2017 U

nknown

 

          LIPID GROUP 07997     Chol/HDL Ratio           3.11 ratio 2017 U

nknown

 

          LIPID GROUP 73449     NON-HDL Chol           99 mg/dL  2017 Unkn

own

 

          LIPID GROUP 79271     LDL Cholesterol           75 mg/dL  2017 U

nknown

 

          GLYCOSYLATED HEMOGLOBIN TEST 38831     Hgb A1c   22722-0   5.6 %     0

2017 Unknown

 

          COMPREHENSIVE METABOLIC 61643     AST                 22 U/L    2017 Unknown

 

          COMPREHENSIVE METABOLIC 27868     ALT                 12 U/L    2017 Unknown

 

          COMPREHENSIVE METABOLIC 05823     BUN                 17 mg/dL  2017 Unknown

 

          COMPREHENSIVE METABOLIC 90030     ALBUMIN             4.0 g/dL  2017 Unknown

 

          COMPREHENSIVE METABOLIC 51364     CHLORIDE            110 mmol/L  Unknown

 

          COMPREHENSIVE METABOLIC 51355     Bili Total           0.4 mg/dL  Unknown

 

          COMPREHENSIVE METABOLIC 07608     ALK PHOS            63 U/L    2017 Unknown

 

          COMPREHENSIVE METABOLIC 06837     SODIUM              140 mmol/L  Unknown

 

          COMPREHENSIVE METABOLIC 86111     CREATININE           1.05 mg/dL  Unknown

 

          COMPREHENSIVE METABOLIC 86323     CALCIUM             9.2 mg/dL 2017 Unknown

 

          COMPREHENSIVE METABOLIC 10479     POTASSIUM           4.2 mmol/L  Unknown

 

          COMPREHENSIVE METABOLIC 51837     Total Protein           6.2 g/dL   Unknown

 

          COMPREHENSIVE METABOLIC 67164     Glucose             87 mg/dL  2017 Unknown

 

          COMPREHENSIVE METABOLIC 59147     Bicarbonate           24 mmol/L  Unknown

 

          COMPREHENSIVE METABOLIC 39227     AGAP                6 mmol/L  2017 Unknown

 

          GFR CALC  0170845   GFR Non Afr Amr           51 mL/min 2017 Unk

nown

 

          GFR CALC  0360295   GFR Afr Amr           >60 mL/min 2017 Unknow

n

 

          COMPLETE BLOOD COUNT [id number]   WBC                 6.7 10e9/L 20

17 Unknown

 

          COMPLETE BLOOD COUNT [id number]   RBC                 4.04 10e12/L 2017 Unknown

 

          COMPLETE BLOOD COUNT [id number]   HEMOGLOBIN           12.1 g/dL 20

17 Unknown

 

          COMPLETE BLOOD COUNT [id number]   HEMATOCRIT           38.0 %    20

17 Unknown

 

          COMPLETE BLOOD COUNT [id number]   MCV                 94.1 fL   

7 Unknown

 

          COMPLETE BLOOD COUNT [id number]   MCH                 30.0 pg   

7 Unknown

 

          COMPLETE BLOOD COUNT [id number]   MCHC                31.8 g/dL 

7 Unknown

 

          COMPLETE BLOOD COUNT [id number]   PLATELET COUNT           206 10e9/L  Unknown

 

          COMPLETE BLOOD COUNT 6019701   Mean Plt Volume           11.3 fL    Unknown

 

          COMPLETE BLOOD COUNT [id number]   Neut Auto           35.8 %    

7 Unknown

 

          COMPLETE BLOOD COUNT [id number]   Lymph Auto           51.6 %    20

17 Unknown

 

          COMPLETE BLOOD COUNT [id number]   Mono Auto           8.8 %     

7 Unknown

 

          COMPLETE BLOOD COUNT [id number]   RDW                 13.5 %    

7 Unknown

 

          COMPLETE BLOOD COUNT [id number]   Eos Auto            3.4 %     

7 Unknown

 

          COMPLETE BLOOD COUNT [id number]   Baso Auto           0.4 %     

7 Unknown

 

          COMPLETE BLOOD COUNT [id number]   Neutrophil Abs           2.40 10e9/L  Unknown

 

          COMPLETE BLOOD COUNT [id number]   Lymphocyte Abs           3.46 10e9/L  Unknown

 

          COMPLETE BLOOD COUNT [id number]   Monocyte Abs           0.59 10e9/L  Unknown

 

          COMPLETE BLOOD COUNT [id number]   Eosinophil Abs           0.23 10e9/L  Unknown

 

          COMPLETE BLOOD COUNT [id number]   RDW-SD              45.3 fL   

7 Unknown

 

          COMPLETE BLOOD COUNT [id number]   Basophil Abs           0.03 10e9/L  Unknown

 

          THYROID STIMULATING HORMONE 00717     TSH                 1.981 uIU/mL

 2017 Unknown

 

          COMPLETE BLOOD COUNT [id number]   WBC                 6.0 10e9/L 20

17 Unknown

 

          COMPLETE BLOOD COUNT [id number]   RBC                 4.29 10e12/L 2017 Unknown

 

          COMPLETE BLOOD COUNT [id number]   HEMOGLOBIN           12.9 g/dL 20

17 Unknown

 

          COMPLETE BLOOD COUNT [id number]   HEMATOCRIT           38.4 %    20

17 Unknown

 

          COMPLETE BLOOD COUNT [id number]   MCV                 89.5 fL   

7 Unknown

 

          COMPLETE BLOOD COUNT [id number]   MCH                 30.1 pg   

7 Unknown

 

          COMPLETE BLOOD COUNT [id number]   MCHC                33.6 g/dL 

7 Unknown

 

          COMPLETE BLOOD COUNT [id number]   PLATELET COUNT           181 10e9/L 2017 Unknown

 

          COMPLETE BLOOD COUNT [id number]   Mean Plt Volume           11.7 fL   2017 Unknown

 

          COMPLETE BLOOD COUNT [id number]   Neut Auto           36.9 %    

7 Unknown

 

          COMPLETE BLOOD COUNT [id number]   Lymph Auto           50.4 %    20

17 Unknown

 

          COMPLETE BLOOD COUNT [id number]   Mono Auto           9.0 %     

7 Unknown

 

          COMPLETE BLOOD COUNT [id number]   RDW                 13.7 %    

7 Unknown

 

          COMPLETE BLOOD COUNT [id number]   Eos Auto            3.4 %     

7 Unknown

 

          COMPLETE BLOOD COUNT [id number]   Baso Auto           0.3 %     

7 Unknown

 

          COMPLETE BLOOD COUNT [id number]   Neutrophil Abs           2.21 10e9/L  Unknown

 

          COMPLETE BLOOD COUNT [id number]   Lymphocyte Abs           3.02 10e9/L  Unknown

 

          COMPLETE BLOOD COUNT [id number]   Monocyte Abs           0.54 10e9/L 2017 Unknown

 

          COMPLETE BLOOD COUNT [id number]   Eosinophil Abs           0.20 10e9/L  Unknown

 

          COMPLETE BLOOD COUNT [id number]   RDW-SD              44.0 fL   

7 Unknown

 

          COMPLETE BLOOD COUNT [id number]   Basophil Abs           0.02 10e9/L 2017 Unknown

 

          GLYCOSYLATED HEMOGLOBIN TEST 03459     Hgb A1c   60367-6   5.4 %     0

3/09/2017 Unknown

 

          THYROID STIMULATING HORMONE 02311     TSH                 2.200 uIU/mL

 2017 Unknown

 

          GFR CALC  3517928   GFR Non Afr Amr           50 mL/min 2017 Unk

nown

 

          GFR CALC  0343978   GFR Afr Amr           >60 mL/min 2017 Unknow

n

 

          MEAN GLUC [id number]   Calc Mean Gluc           108 mg/dL 2017 Unkn

own

 

          COMPREHENSIVE METABOLIC 97003     AST                 18 U/L    2017 Unknown

 

          COMPREHENSIVE METABOLIC 84148     ALT                 10 U/L    2017 Unknown

 

          COMPREHENSIVE METABOLIC 08685     BUN                 20 mg/dL  2017 Unknown

 

          COMPREHENSIVE METABOLIC 48530     ALBUMIN             4.1 g/dL  2017 Unknown

 

          COMPREHENSIVE METABOLIC 66479     CHLORIDE            109 mmol/L  Unknown

 

          COMPREHENSIVE METABOLIC 94268     Bili Total           0.6 mg/dL  Unknown

 

          COMPREHENSIVE METABOLIC 00884     ALK PHOS            64 U/L    2017 Unknown

 

          COMPREHENSIVE METABOLIC 64896     SODIUM              141 mmol/L  Unknown

 

          COMPREHENSIVE METABOLIC 75554     CREATININE           1.06 mg/dL 2017 Unknown

 

          COMPREHENSIVE METABOLIC 34718     CALCIUM             9.9 mg/dL 2017 Unknown

 

          COMPREHENSIVE METABOLIC 28911     POTASSIUM           4.2 mmol/L  Unknown

 

          COMPREHENSIVE METABOLIC 46783     Total Protein           6.3 g/dL   Unknown

 

          COMPREHENSIVE METABOLIC 90782     Glucose             99 mg/dL  2017 Unknown

 

          COMPREHENSIVE METABOLIC 13413     Bicarbonate           21 mmol/L 2017 Unknown

 

          COMPREHENSIVE METABOLIC 46232     AGAP                11 mmol/L 2017 Unknown

 

          LIPID GROUP [id number]     Cholesterol           169 mg/dL 2016 Unkno

wn

 

          LIPID GROUP [id number]     Triglyceride           165 mg/dL 2016 Unkn

own

 

          LIPID GROUP [id number]     HDL CHOLESTEROL           43 mg/dL  2016 U

nknown

 

          LIPID GROUP [id number]     Chol/HDL Ratio           3.93 ratio 2016 U

nknown

 

          LIPID GROUP [id number]     NON-HDL Chol           126 mg/dL 2016 Unkn

own

 

          LIPID GROUP [id number]     LDL Cholesterol           93 mg/dL  2016 U

nknown

 

          COMPREHENSIVE METABOLIC 95001     AST                 18 U/L    2016 Unknown

 

          COMPREHENSIVE METABOLIC 65459     ALT                 10 U/L    2016 Unknown

 

          COMPREHENSIVE METABOLIC 34019     BUN                 20 mg/dL  2016 Unknown

 

          COMPREHENSIVE METABOLIC 52146     ALBUMIN             3.9 g/dL  2016 Unknown

 

          COMPREHENSIVE METABOLIC 64892     CHLORIDE            110 mmol/L  Unknown

 

          COMPREHENSIVE METABOLIC 90782     Bili Total           0.5 mg/dL  Unknown

 

          COMPREHENSIVE METABOLIC 28332     ALK PHOS            72 U/L    2016 Unknown

 

          COMPREHENSIVE METABOLIC 84382     SODIUM              141 mmol/L  Unknown

 

          COMPREHENSIVE METABOLIC 49197     CREATININE           1.12 mg/dL  Unknown

 

          COMPREHENSIVE METABOLIC 87593     CALCIUM             9.7 mg/dL 2016 Unknown

 

          COMPREHENSIVE METABOLIC 92206     POTASSIUM           4.4 mmol/L  Unknown

 

          COMPREHENSIVE METABOLIC 33433     Total Protein           6.2 g/dL   Unknown

 

          COMPREHENSIVE METABOLIC 45576     Glucose             90 mg/dL  2016 Unknown

 

          COMPREHENSIVE METABOLIC 76466     Bicarbonate           23 mmol/L  Unknown

 

          COMPREHENSIVE METABOLIC 00938     AGAP                8 mmol/L  2016 Unknown

 

          GFR CALC  6525849   GFR Non Afr Amr           47 mL/min 2016 Unk

nown

 

          GFR CALC  3596133   GFR Afr Amr           57 mL/min 2016 Unknown

 

          GLYCOSYLATED HEMOGLOBIN TEST 52515     Hgb A1c   60318-9   5.5 %     0

2016 Unknown

 

          THYROID STIMULATING HORMONE 19344     TSH                 2.537 uIU/mL

 2016 Unknown

 

          FREE T4   90771     T4 Free             1.36 ng/dL 2016 Unknown

 

          COMPLETE BLOOD COUNT [id number]   WBC                 6.8 10e9/L 20

16 Unknown

 

          COMPLETE BLOOD COUNT [id number]   RBC                 4.20 10e12/L 2016 Unknown

 

          COMPLETE BLOOD COUNT 1343237   HEMOGLOBIN           12.5 g/dL 20

16 Unknown

 

          COMPLETE BLOOD COUNT [id number]   HEMATOCRIT           38.0 %    20

16 Unknown

 

          COMPLETE BLOOD COUNT [id number]   MCV                 90.5 fL   

6 Unknown

 

          COMPLETE BLOOD COUNT [id number]   MCH                 29.8 pg   

6 Unknown

 

          COMPLETE BLOOD COUNT 8481727   MCHC                32.9 g/dL 

6 Unknown

 

          COMPLETE BLOOD COUNT 1106983   PLATELET COUNT           197 10e9/L  Unknown

 

          COMPLETE BLOOD COUNT [id number]   Mean Plt Volume           11.7 fL    Unknown

 

          COMPLETE BLOOD COUNT [id number]   Neut Auto           41.3 %    

6 Unknown

 

          COMPLETE BLOOD COUNT [id number]   Lymph Auto           47.1 %    20

16 Unknown

 

          COMPLETE BLOOD COUNT [id number]   Mono Auto           7.8 %     

6 Unknown

 

          COMPLETE BLOOD COUNT [id number]   RDW                 13.8 %    

6 Unknown

 

          COMPLETE BLOOD COUNT 6457969   Eos Auto            3.4 %     

6 Unknown

 

          COMPLETE BLOOD COUNT [id number]   Baso Auto           0.4 %     

6 Unknown

 

          COMPLETE BLOOD COUNT [id number]   Neutrophil Abs           2.81 10e9/L  Unknown

 

          COMPLETE BLOOD COUNT [id number]   Lymphocyte Abs           3.20 10e9/L  Unknown

 

          COMPLETE BLOOD COUNT [id number]   Monocyte Abs           0.53 10e9/L  Unknown

 

          COMPLETE BLOOD COUNT [id number]   Eosinophil Abs           0.23 10e9/L  Unknown

 

          COMPLETE BLOOD COUNT [id number]   RDW-SD              44.4 fL   

6 Unknown

 

          COMPLETE BLOOD COUNT 1381839   Basophil Abs           0.03 10e9/L  Unknown

 

          MEAN GLUC 3294446   Calc Mean Gluc           111 mg/dL 2016 Unkn

own

 

          [place] [address]     Glucose             89 MG/DL   Unknown

 

          [place] [address]     CREATININE           1.12 MG/DL  Unknown

 

          [place] [address]     BUN                 20 MG/DL   Unknown

 

          [place] [address]     SODIUM              139 MMOL/L  Unknown

 

          [place] [address]     POTASSIUM           4.6 MMOL/L  Unknown

 

          [place] [address]     CHLORIDE            108 MMOL/L  Unknown

 

          [place] [address]     BICARB              26 MMOL/L  Unknown

 

          [place] [address]     ANION GAP           5 MEQ/L    Unknown

 

          [place] [address]     CALCIUM             10.0 MG/DL  Unknown

 

          GFR CALC  0593596   GFR AA              57.0L ML/MIN 2015 Unknow

n

 

          GFR CALC  8608067   GFR NON-AA           47.0L ML/MIN 2015 Unkno

wn

 

          THYROID STIMULATING HORMONE 72488     TSH                 2.378 uIU/ML

 09/10/2015 Unknown

 

          COMPLETE BLOOD COUNT 4000651   WBC                 6.4 10e9/L 09/10/20

15 Unknown

 

          COMPLETE BLOOD COUNT [id number]   RBC                 3.99 10e12/L 09/10/

2015 Unknown

 

          COMPLETE BLOOD COUNT [id number]   HGB                 11.9 g/dL 09/10/201

5 Unknown

 

          COMPLETE BLOOD COUNT 8852378   HCT DET             36.9 %    09/10/201

5 Unknown

 

          COMPLETE BLOOD COUNT [id number]   MCV                 92.5 fL   09/10/201

5 Unknown

 

          COMPLETE BLOOD COUNT [id number]   MCH                 29.8 pg   09/10/201

5 Unknown

 

          COMPLETE BLOOD COUNT [id number]   MCHC                32.2 g/dL 09/10/201

5 Unknown

 

          COMPLETE BLOOD COUNT [id number]   PLT                 172 10e9/L 09/10/20

15 Unknown

 

          COMPLETE BLOOD COUNT [id number]   MPV                 11.7 fL   09/10/201

5 Unknown

 

          COMPLETE BLOOD COUNT [id number]   ARIK %               40.4 %    09/10/201

5 Unknown

 

          COMPLETE BLOOD COUNT [id number]   LY %                48.0 %    09/10/201

5 Unknown

 

          COMPLETE BLOOD COUNT 7058348   MON %               8.3 %     09/10/201

5 Unknown

 

          COMPLETE BLOOD COUNT [id number]   EOS  %              2.8 %     09/10/201

5 Unknown

 

          COMPLETE BLOOD COUNT [id number]   BASO %              0.5 %     09/10/201

5 Unknown

 

          COMPLETE BLOOD COUNT [id number]   RDW                 13.6 %    09/10/201

5 Unknown

 

          COMPLETE BLOOD COUNT [id number]   ABS ARIK             2.59 10e9/L 09/10/2

015 Unknown

 

          COMPLETE BLOOD COUNT [id number]   ABS LYMPH           3.07 10e9/L 09/10/2

015 Unknown

 

          COMPLETE BLOOD COUNT [id number]   ABS MONO            0.53 10e9/L 09/10/2

015 Unknown

 

          COMPLETE BLOOD COUNT [id number]   ABS EOS             0.18 10e9/L 09/10/2

015 Unknown

 

          COMPLETE BLOOD COUNT [id number]   ABS BASO            0.03 10e9/L 09/10/2

015 Unknown

 

          COMPLETE BLOOD COUNT [id number]   RDW-SD              44.9 fL   09/10/201

5 Unknown

 

          LIPID GROUP [id number]     HDL TEST            42 MG/DL  09/10/2015 Unknown

 

          LIPID GROUP [id number]     TRIG                177 MG/DL 09/10/2015 Unknown

 

          LIPID GROUP [id number]     TEST LDL            72 MG/DL  09/10/2015 Unknown

 

          LIPID GROUP 65926     CHOL                149 MG/DL 09/10/2015 Unknown

 

          LIPID GROUP [id number]     RCHOL/HDL           3.55 RATIO 09/10/2015 Unknow

n

 

          LIPID GROUP [id number]     NON-HDL CH           107 MG/DL 09/10/2015 Unknow

n

 

          GLYCOSYLATED HEMOGLOBIN TEST 89087     A1C HPLC  45003-1   5.5 %     0

9/10/2015 Unknown

 

          FREE T4   45848     FREE T4             1.39 NG/DL 09/10/2015 Unknown

 

          GFR CALC  3687209   GFR AA              55.0L ML/MIN 09/10/2015 Unknow

n

 

          GFR CALC  2793510   GFR NON-AA           46.0L ML/MIN 09/10/2015 Unkno

wn

 

          COMPREHENSIVE METABOLIC 47490     AST                 17 U/L    09/10/

2015 Unknown

 

          COMPREHENSIVE METABOLIC 03973     ALT                 10 IU/L   09/10/

2015 Unknown

 

          COMPREHENSIVE METABOLIC 53591     BUN                 20 MG/DL  09/10/

2015 Unknown

 

          COMPREHENSIVE METABOLIC 04602     ALBUMIN             3.9 GM/DL 09/10/

2015 Unknown

 

          COMPREHENSIVE METABOLIC 72299     CHLORIDE            111 MMOL/L 09/10

/2015 Unknown

 

          COMPREHENSIVE METABOLIC 67612     BILI TOT            0.4 MG/DL 09/10/

2015 Unknown

 

          COMPREHENSIVE METABOLIC 28742     ALK PHOS            70 U/L    09/10/

2015 Unknown

 

          COMPREHENSIVE METABOLIC 18407     SODIUM              142 MMOL/L 09/10

/2015 Unknown

 

          COMPREHENSIVE METABOLIC 62457     CREATININE           1.16 MG/DL  Unknown

 

          COMPREHENSIVE METABOLIC 33521     CALCIUM             9.4 MG/DL 09/10/

2015 Unknown

 

          COMPREHENSIVE METABOLIC 48128     POTASSIUM           4.6 MMOL/L 09/10

/2015 Unknown

 

          COMPREHENSIVE METABOLIC 43131     PROT TOT            6.2 GM/DL 09/10/

2015 Unknown

 

          COMPREHENSIVE METABOLIC 48073     Glucose             90 MG/DL  09/10/

2015 Unknown

 

          COMPREHENSIVE METABOLIC 51207     BICARB              24 MMOL/L 09/10/

2015 Unknown

 

          COMPREHENSIVE METABOLIC 84645     ANION GAP           7 MEQ/L   09/10/

2015 Unknown

 

          THYROID STIMULATING HORMONE 84290     TSH                 2.427 uIU/ML

 2015 Unknown

 

          LIPID GROUP [id number]     HDL TEST            47 MG/DL  2015 Unknown

 

          LIPID GROUP [id number]     TRIG                145 MG/DL 2015 Unknown

 

          LIPID GROUP [id number]     TEST LDL            73 MG/DL  2015 Unknown

 

          LIPID GROUP 67795     CHOL                149 MG/DL 2015 Unknown

 

          LIPID GROUP [id number]     RCHOL/HDL           3.17 RATIO 2015 Unknow

n

 

          LIPID GROUP 06251     NON-HDL CH           102 MG/DL 2015 Unknow

n

 

          COMPREHENSIVE METABOLIC 14598     AST                 17 U/L    2015 Unknown

 

          COMPREHENSIVE METABOLIC 38318     ALT                 9 IU/L    2015 Unknown

 

          COMPREHENSIVE METABOLIC 43598     BUN                 19 MG/DL  2015 Unknown

 

          COMPREHENSIVE METABOLIC 95116     ALBUMIN             4.3 GM/DL 2015 Unknown

 

          COMPREHENSIVE METABOLIC 47667     CHLORIDE            108 MMOL/L  Unknown

 

          COMPREHENSIVE METABOLIC 51640     BILI TOT            0.5 MG/DL 2015 Unknown

 

          COMPREHENSIVE METABOLIC 58996     ALK PHOS            68 U/L    2015 Unknown

 

          COMPREHENSIVE METABOLIC 71484     SODIUM              140 MMOL/L  Unknown

 

          COMPREHENSIVE METABOLIC 58016     CREATININE           1.08 MG/DL 2015 Unknown

 

          COMPREHENSIVE METABOLIC 24016     CALCIUM             9.9 MG/DL 2015 Unknown

 

          COMPREHENSIVE METABOLIC 66702     POTASSIUM           4.3 MMOL/L  Unknown

 

          COMPREHENSIVE METABOLIC 33333     PROT TOT            7.2 GM/DL 2015 Unknown

 

          COMPREHENSIVE METABOLIC 07371     Glucose             94 MG/DL  2015 Unknown

 

          COMPREHENSIVE METABOLIC 97563     BICARB              26 MMOL/L 2015 Unknown

 

          COMPREHENSIVE METABOLIC 74076     ANION GAP           6 MEQ/L   2015 Unknown

 

          GFR CALC  4464856   GFR AA              60.0L ML/MIN 2015 Unknow

n

 

          GFR CALC  2063720   GFR NON-AA           49.0L ML/MIN 2015 Unkno

wn

 

          GLYCOSYLATED HEMOGLOBIN TEST 22415     A1C HPLC  49686-6   5.6 %     0

3/06/2015 Unknown

 

          COMPLETE BLOOD COUNT 7259741   WBC                 7.2 10e9/L 20

15 Unknown

 

          COMPLETE BLOOD COUNT [id number]   RBC                 4.28 10e12/L 2015 Unknown

 

          COMPLETE BLOOD COUNT 4922637   HGB                 12.8 g/dL 

5 Unknown

 

          COMPLETE BLOOD COUNT 8861666   HCT DET             39.3 %    

5 Unknown

 

          COMPLETE BLOOD COUNT 0495651   MCV                 91.8 fL   

5 Unknown

 

          COMPLETE BLOOD COUNT 6652660   MCH                 29.9 pg   

5 Unknown

 

          COMPLETE BLOOD COUNT [id number]   MCHC                32.6 g/dL 

5 Unknown

 

          COMPLETE BLOOD COUNT 1075984   PLT                 189 10e9/L 20

15 Unknown

 

          COMPLETE BLOOD COUNT [id number]   MPV                 11.2 fL   

5 Unknown

 

          COMPLETE BLOOD COUNT 7324204   ARIK %               38.0 %    

5 Unknown

 

          COMPLETE BLOOD COUNT 5197072   LY %                51.0 %    

5 Unknown

 

          COMPLETE BLOOD COUNT 1478037   MON %               7.7 %     

5 Unknown

 

          COMPLETE BLOOD COUNT 8890027   EOS  %              2.9 %     

5 Unknown

 

          COMPLETE BLOOD COUNT 1919286   BASO %              0.4 %     

5 Unknown

 

          COMPLETE BLOOD COUNT [id number]   RDW                 14.0 %    

5 Unknown

 

          COMPLETE BLOOD COUNT 8574191   ABS ARIK             2.74 10e9/L 

015 Unknown

 

          COMPLETE BLOOD COUNT [id number]   ABS LYMPH           3.67 10e9/L 

015 Unknown

 

          COMPLETE BLOOD COUNT 2571232   ABS MONO            0.55 10e9/L 

015 Unknown

 

          COMPLETE BLOOD COUNT [id number]   ABS EOS             0.21 10e9/L 

015 Unknown

 

          COMPLETE BLOOD COUNT 4779613   ABS BASO            0.03 10e9/L 

015 Unknown

 

          COMPLETE BLOOD COUNT [id number]   RDW-SD              46.1 fL   

5 Unknown

 

          FREE T4   48141     FREE T4             1.14 NG/DL 2015 Unknown

 

          GLYCOSYLATED HEMOGLOBIN TEST 37525     A1C HPLC  83912-8   5.2 %     0

2014 Unknown

 

          FREE T4   84909     FREE T4             1.40 NG/DL 2014 Unknown

 

          GFR CALC  4192875   GFR AA              >60 ML/MIN 2014 Unknown

 

          GFR CALC  8557374   GFR NON-AA           52.0L ML/MIN 2014 Unkno

wn

 

          COMPREHENSIVE METABOLIC 95648     AST                 15 U/L    2014 Unknown

 

          COMPREHENSIVE METABOLIC 17811     ALT                 9 IU/L    2014 Unknown

 

          COMPREHENSIVE METABOLIC 92310     BUN                 17 MG/DL  2014 Unknown

 

          COMPREHENSIVE METABOLIC 91774     ALBUMIN             4.0 GM/DL 2014 Unknown

 

          COMPREHENSIVE METABOLIC 75729     CHLORIDE            112 MMOL/L  Unknown

 

          COMPREHENSIVE METABOLIC 50019     BILI TOT            0.5 MG/DL 2014 Unknown

 

          COMPREHENSIVE METABOLIC 93767     ALK PHOS            66 U/L    2014 Unknown

 

          COMPREHENSIVE METABOLIC 76327     SODIUM              140 MMOL/L  Unknown

 

          COMPREHENSIVE METABOLIC 21262     CREATININE           1.03 MG/DL  Unknown

 

          COMPREHENSIVE METABOLIC 68966     CALCIUM             9.5 MG/DL 2014 Unknown

 

          COMPREHENSIVE METABOLIC 69416     POTASSIUM           4.1 MMOL/L  Unknown

 

          COMPREHENSIVE METABOLIC 24104     PROT TOT            6.2 GM/DL 2014 Unknown

 

          COMPREHENSIVE METABOLIC 93687     Glucose             102 MG/DL 2014 Unknown

 

          COMPREHENSIVE METABOLIC 76810     BICARB              23 MMOL/L 2014 Unknown

 

          COMPREHENSIVE METABOLIC 47991     ANION GAP           5 MEQ/L   2014 Unknown

 

          THYROID STIMULATING HORMONE 47021     TSH                 2.074 uIU/ML

 2014 Unknown

 

          VITAMIN B 12 FOLIC ACID [id number] VIT B 12            423 PG/ML  Unknown

 

          VITAMIN B 12 FOLIC ACID [id number] FOLIC ACID           19.7 NG/ML  Unknown

 

          LIPID GROUP [id number]     HDL TEST            40 MG/DL  2014 Unknown

 

          LIPID GROUP [id number]     TRIG                145 MG/DL 2014 Unknown

 

          LIPID GROUP [id number]     TEST LDL            81 MG/DL  2014 Unknown

 

          LIPID GROUP 73168     CHOL                150 MG/DL 2014 Unknown

 

          LIPID GROUP [id number]     RCHOL/HDL           3.75 RATIO 2014 Unknow

n

 

          COMPLETE BLOOD COUNT 9041576   WBC                 6.0 10e9/L 20

14 Unknown

 

          COMPLETE BLOOD COUNT 1424900   RBC                 4.26 10e12/L 2014 Unknown

 

          COMPLETE BLOOD COUNT 5948857   HGB                 12.7 g/dL 

4 Unknown

 

          COMPLETE BLOOD COUNT 7520017   HCT DET             38.7 %    

4 Unknown

 

          COMPLETE BLOOD COUNT [id number]   MCV                 90.8 fL   

4 Unknown

 

          COMPLETE BLOOD COUNT 0098987   MCH                 29.8 pg   

4 Unknown

 

          COMPLETE BLOOD COUNT 3871903   MCHC                32.8 g/dL 

4 Unknown

 

          COMPLETE BLOOD COUNT [id number]   PLT                 178 10e9/L 20

14 Unknown

 

          COMPLETE BLOOD COUNT [id number]   MPV                 11.7 fL   

4 Unknown

 

          COMPLETE BLOOD COUNT 3937940   ARIK %               30.5 %    

4 Unknown

 

          COMPLETE BLOOD COUNT 9231080   LY %                55.4 %    

4 Unknown

 

          COMPLETE BLOOD COUNT 0281640   MON %               9.0 %     

4 Unknown

 

          COMPLETE BLOOD COUNT 1010288   EOS  %              4.4 %     

4 Unknown

 

          COMPLETE BLOOD COUNT 7489531   BASO %              0.7 %     

4 Unknown

 

          COMPLETE BLOOD COUNT [id number]   RDW                 13.3 %    

4 Unknown

 

          COMPLETE BLOOD COUNT 7497349   ABS ARIK             1.83 10e9/L 

014 Unknown

 

          COMPLETE BLOOD COUNT [id number]   ABS LYMPH           3.32 10e9/L 

014 Unknown

 

          COMPLETE BLOOD COUNT 2562071   ABS MONO            0.54 10e9/L 

014 Unknown

 

          COMPLETE BLOOD COUNT [id number]   ABS EOS             0.26 10e9/L 

014 Unknown

 

          COMPLETE BLOOD COUNT [id number]   ABS BASO            0.04 10e9/L 

014 Unknown

 

          COMPLETE BLOOD COUNT [id number]   RDW-SD              43.2 fL   

4 Unknown

 

          HEMOGLOBIN A1C (GLYCOSYLATED) 4090225   A1C [facility]  79494-6   5.5 %     

2012 Unknown

 

          COMPLETE BLOOD COUNT [id number]   WBC                 6.0 10e9/L 20

12 Unknown

 

          COMPLETE BLOOD COUNT [id number]   RBC                 4.22 10e12/L 2012 Unknown

 

          COMPLETE BLOOD COUNT 4060574   HGB                 12.4 g/dL 

2 Unknown

 

          COMPLETE BLOOD COUNT [id number]   HCT DET             38.2 %    

2 Unknown

 

          COMPLETE BLOOD COUNT 0851414   MCV                 90.5 fL   

2 Unknown

 

          COMPLETE BLOOD COUNT 0351517   MCH                 29.4 pg   

2 Unknown

 

          COMPLETE BLOOD COUNT [id number]   MCHC                32.5 g/dL 

2 Unknown

 

          COMPLETE BLOOD COUNT [id number]   PLT                 187 10e9/L 20

12 Unknown

 

          COMPLETE BLOOD COUNT [id number]   MPV                 11.5 fL   

2 Unknown

 

          COMPLETE BLOOD COUNT [id number]   ARIK %               36.4 %    

2 Unknown

 

          COMPLETE BLOOD COUNT [id number]   LY %                51.0 %    

2 Unknown

 

          COMPLETE BLOOD COUNT [id number]   MON %               8.7 %     

2 Unknown

 

          COMPLETE BLOOD COUNT [id number]   EOS  %              3.2 %     

2 Unknown

 

          COMPLETE BLOOD COUNT 5190571   BASO %              0.7 %     

2 Unknown

 

          COMPLETE BLOOD COUNT [id number]   RDW                 13.7 %    

2 Unknown

 

          COMPLETE BLOOD COUNT 1403599   ABS ARIK             2.18 10e9/L 

012 Unknown

 

          COMPLETE BLOOD COUNT [id number]   ABS LYMPH           3.06 10e9/L 

012 Unknown

 

          COMPLETE BLOOD COUNT [id number]   ABS MONO            0.52 10e9/L 

012 Unknown

 

          COMPLETE BLOOD COUNT 4677788   ABS EOS             0.19 10e9/L 

012 Unknown

 

          COMPLETE BLOOD COUNT [id number]   ABS BASO            0.04 10e9/L 

012 Unknown

 

          COMPLETE BLOOD COUNT [id number]   RDW-SD              44.3 fL   

2 Unknown

 

          LIPID GROUP [id number]     HDL TEST            42 MG/DL  2012 Unknown

 

          LIPID GROUP [id number]     TRIG                156 MG/DL 2012 Unknown

 

          LIPID GROUP [id number]     TEST LDL            80 MG/DL  2012 Unknown

 

          LIPID GROUP 93461     CHOL                153 MG/DL 2012 Unknown

 

          LIPID GROUP [id number]     RCHOL/HDL           3.64 RATIO 2012 Unknow

n

 

          FREE T4   32567     FREE T4             1.22 NG/DL 2012 Unknown

 

          COMPREHENSIVE METABOLIC 95900     AST                 20 U/L    2012 Unknown

 

          COMPREHENSIVE METABOLIC 01561     ALT                 11 IU/L   2012 Unknown

 

          COMPREHENSIVE METABOLIC 64420     BUN                 19 MG/DL  2012 Unknown

 

          COMPREHENSIVE METABOLIC 33187     ALBUMIN             4.3 GM/DL 2012 Unknown

 

          COMPREHENSIVE METABOLIC 67426     CHLORIDE            109 MMOL/L  Unknown

 

          COMPREHENSIVE METABOLIC 34506     BILI TOT            0.6 MG/DL 2012 Unknown

 

          COMPREHENSIVE METABOLIC 57234     ALK PHOS            84 U/L    2012 Unknown

 

          COMPREHENSIVE METABOLIC 83400     SODIUM              142 MMOL/L  Unknown

 

          COMPREHENSIVE METABOLIC 60238     CREATININE           1.09 MG/DL  Unknown

 

          COMPREHENSIVE METABOLIC 84612     CALCIUM             9.8 MG/DL 2012 Unknown

 

          COMPREHENSIVE METABOLIC 31384     POTASSIUM           4.2 MMOL/L  Unknown

 

          COMPREHENSIVE METABOLIC 33635     PROT TOT            6.4 GM/DL 2012 Unknown

 

          COMPREHENSIVE METABOLIC 47011     Glucose             89 MG/DL  2012 Unknown

 

          COMPREHENSIVE METABOLIC 38225     BICARB              25 MMOL/L 2012 Unknown

 

          COMPREHENSIVE METABOLIC 46603     ANION GAP           8 MEQ/L   2012 Unknown

 

          GFR CALC  5191262   GFR AA              60.0L ML/MIN 2012 Unknow

n

 

          GFR CALC  7220185   GFR NON-AA           49.0L ML/MIN 2012 Unkno

wn

 

          THYROID STIMULATING HORMONE 13490     TSH                 2.450 uIU/ML

 2012 Unknown

 

          COMPREHENSIVE METABOLIC 18597     AST                 22 U/L    2012 Unknown

 

          COMPREHENSIVE METABOLIC 13802     ALT                 14 IU/L   2012 Unknown

 

          COMPREHENSIVE METABOLIC 62461     BUN                 21 MG/DL  2012 Unknown

 

          COMPREHENSIVE METABOLIC 40216     ALBUMIN             4.3 GM/DL 2012 Unknown

 

          COMPREHENSIVE METABOLIC 72013     CHLORIDE            106 MMOL/L  Unknown

 

          COMPREHENSIVE METABOLIC 40107     BILI TOT            0.4 MG/DL 2012 Unknown

 

          COMPREHENSIVE METABOLIC 96920     ALK PHOS            80 U/L    2012 Unknown

 

          COMPREHENSIVE METABOLIC 45427     SODIUM              141 MMOL/L  Unknown

 

          COMPREHENSIVE METABOLIC 17198     CREATININE           1.13 MG/DL  Unknown

 

          COMPREHENSIVE METABOLIC 52493     CALCIUM             9.4 MG/DL 2012 Unknown

 

          COMPREHENSIVE METABOLIC 59488     POTASSIUM           4.3 MMOL/L  Unknown

 

          COMPREHENSIVE METABOLIC 60266     PROT TOT            6.7 GM/DL 2012 Unknown

 

          COMPREHENSIVE METABOLIC 43209     Glucose             98 MG/DL  2012 Unknown

 

          COMPREHENSIVE METABOLIC 55905     BICARB              25 MMOL/L 2012 Unknown

 

          COMPREHENSIVE METABOLIC 08895     ANION GAP           10 MEQ/L  2012 Unknown

 

          LIPID GROUP 86087     HDL TEST            44 MG/DL  2012 Unknown

 

          LIPID GROUP 79530     TRIG                164 MG/DL 2012 Unknown

 

          LIPID GROUP 64088     TEST LDL            98 MG/DL  2012 Unknown

 

          LIPID GROUP 92963     CHOL                175 MG/DL 2012 Unknown

 

          LIPID GROUP 62721     RCHOL/HDL           3.98 RATIO 2012 Unknow

n

 

          COMPLETE BLOOD COUNT 79875     WBC                 6.7 10e9/L 20

12 Unknown

 

          COMPLETE BLOOD COUNT 57622     RBC                 4.36 10e12/L 2012 Unknown

 

          COMPLETE BLOOD COUNT 13063     HGB                 12.9 g/dL 

2 Unknown

 

          COMPLETE BLOOD COUNT 85902     HCT DET             39.4 %    

2 Unknown

 

          COMPLETE BLOOD COUNT 81319     MCV                 90.4 fL   

2 Unknown

 

          COMPLETE BLOOD COUNT 93656     MCH                 29.6 pg   

2 Unknown

 

          COMPLETE BLOOD COUNT 87411     MCHC                32.7 g/dL 

2 Unknown

 

          COMPLETE BLOOD COUNT 97726     PLT                 184 10e9/L 20

12 Unknown

 

          COMPLETE BLOOD COUNT 04636     MPV                 10.9 fL   

2 Unknown

 

          COMPLETE BLOOD COUNT 18183     ARIK %               41.5 %    

2 Unknown

 

          COMPLETE BLOOD COUNT 03778     LY %                45.7 %    

2 Unknown

 

          COMPLETE BLOOD COUNT 74680     MON %               9.4 %     

2 Unknown

 

          COMPLETE BLOOD COUNT 16847     EOS  %              3.0 %     

2 Unknown

 

          COMPLETE BLOOD COUNT 09287     BASO %              0.4 %     

2 Unknown

 

          COMPLETE BLOOD COUNT 09562     RDW                 13.2 %    

2 Unknown

 

          COMPLETE BLOOD COUNT 93959     ABS ARIK             2.78 10e9/L 

012 Unknown

 

          COMPLETE BLOOD COUNT 53291     ABS LYMPH           3.06 10e9/L 

012 Unknown

 

          COMPLETE BLOOD COUNT 98392     ABS MONO            0.63 10e9/L 

012 Unknown

 

          COMPLETE BLOOD COUNT 17745     ABS EOS             0.20 10e9/L 

012 Unknown

 

          COMPLETE BLOOD COUNT 38153     ABS BASO            0.03 10e9/L 

012 Unknown

 

          COMPLETE BLOOD COUNT 48778     RDW-SD              42.3 fL   

2 Unknown

 

          GFR CALC  8149560   GFR AA              57.0L ML/MIN 2012 Unknow

n

 

          GFR CALC  2197579   GFR NON-AA           47.0L ML/MIN 2012 Unkno

wn

 

          THYROID STIMULATING HORMONE 95261     TSH                 2.663 uIU/ML

 2012 Unknown

 

          FREE T4   60603     FREE T4             1.15 NG/DL 2012 Unknown

 

          THYROID STIMULATING HORMONE 89759     TSH                 1.908 uIU/ML

 2011 Unknown

 

          COMPLETE BLOOD COUNT 46981     WBC                 6.4 10e9/L 20

11 Unknown

 

          COMPLETE BLOOD COUNT 12470     RBC                 3.92 10e12/L 2011 Unknown

 

          COMPLETE BLOOD COUNT 53988     HGB                 11.8 g/dL 

1 Unknown

 

          COMPLETE BLOOD COUNT 46419     HCT DET             36.0 %    

1 Unknown

 

          COMPLETE BLOOD COUNT 27181     MCV                 91.8 fL   

1 Unknown

 

          COMPLETE BLOOD COUNT 40227     MCH                 30.1 pg   

1 Unknown

 

          COMPLETE BLOOD COUNT 66899     MCHC                32.8 g/dL 

1 Unknown

 

          COMPLETE BLOOD COUNT 05755     PLT                 176 10e9/L 20

11 Unknown

 

          COMPLETE BLOOD COUNT 08576     MPV                 11.4 fL   

1 Unknown

 

          COMPLETE BLOOD COUNT 81545     ARIK %               50.4 %    

1 Unknown

 

          COMPLETE BLOOD COUNT 10616     LY %                35.5 %    

1 Unknown

 

          COMPLETE BLOOD COUNT 25084     MON %               10.2 %    

1 Unknown

 

          COMPLETE BLOOD COUNT 74221     EOS  %              3.3 %     

1 Unknown

 

          COMPLETE BLOOD COUNT 76052     BASO %              0.6 %     

1 Unknown

 

          COMPLETE BLOOD COUNT 30712     RDW                 13.7 %    

1 Unknown

 

          COMPLETE BLOOD COUNT 84697     ABS ARIK             3.23 10e9/L 

011 Unknown

 

          COMPLETE BLOOD COUNT 53832     ABS LYMPH           2.27 10e9/L 

011 Unknown

 

          COMPLETE BLOOD COUNT 72737     ABS MONO            0.65 10e9/L 

011 Unknown

 

          COMPLETE BLOOD COUNT 30098     ABS EOS             0.21 10e9/L 

011 Unknown

 

          COMPLETE BLOOD COUNT 83423     ABS BASO            0.04 10e9/L 

011 Unknown

 

          COMPLETE BLOOD COUNT 52574     RDW-SD              45.3 fL   

1 Unknown

 

          GFR CALC  9144161   GFR AA              >60 ML/MIN 2011 Unknown

 

          GFR CALC  5590726   GFR NON-AA           53.0L ML/MIN 2011 Unkno

wn

 

          FREE T4   15580     FREE T4             1.20 NG/DL 2011 Unknown

 

          COMPREHENSIVE METABOLIC 33279     AST                 17 U/L    2011 Unknown

 

          COMPREHENSIVE METABOLIC 40879     ALT                 9 IU/L    2011 Unknown

 

          COMPREHENSIVE METABOLIC 01952     BUN                 16 MG/DL  2011 Unknown

 

          COMPREHENSIVE METABOLIC 25775     ALBUMIN             4.0 GM/DL 2011 Unknown

 

          COMPREHENSIVE METABOLIC 98553     CHLORIDE            108 MMOL/L  Unknown

 

          COMPREHENSIVE METABOLIC 58231     BILI TOT            0.5 MG/DL 2011 Unknown

 

          COMPREHENSIVE METABOLIC 25836     ALK PHOS            76 U/L    2011 Unknown

 

          COMPREHENSIVE METABOLIC 67270     SODIUM              139 MMOL/L  Unknown

 

          COMPREHENSIVE METABOLIC 75305     CREATININE           1.02 MG/DL 2011 Unknown

 

          COMPREHENSIVE METABOLIC 30604     CALCIUM             9.2 MG/DL 2011 Unknown

 

          COMPREHENSIVE METABOLIC 01646     POTASSIUM           4.5 MMOL/L  Unknown

 

          COMPREHENSIVE METABOLIC 91944     PROT TOT            6.1 GM/DL 2011 Unknown

 

          COMPREHENSIVE METABOLIC 16042     Glucose             93 MG/DL  2011 Unknown

 

          COMPREHENSIVE METABOLIC 65670     BICARB              26 MMOL/L 2011 Unknown

 

          COMPREHENSIVE METABOLIC 11488     ANION GAP           5 MEQ/L   2011 Unknown

 

          LIPID GROUP 11779     HDL TEST            46 MG/DL  2011 Unknown

 

          LIPID GROUP 97549     TRIG                102 MG/DL 2011 Unknown

 

          LIPID GROUP 77976     TEST LDL            88 MG/DL  2011 Unknown

 

          LIPID GROUP 21535     CHOL                154 MG/DL 2011 Unknown

 

          LIPID GROUP 64021     RCHOL/HDL           3.35 RATIO 2011 Unknow

n







Procedures





                    Procedure           Codes               Date

 

                    ROUTINE VENIPUNCTURE CPT-4: 16821        2020

 

                    ASSAY THYROID STIM HORMONE CPT-4: 41578        2020

 

                    COMPLETE CBC W/AUTO DIFF WBC CPT-4: 49481        2020

 

                    COMPREHEN METABOLIC PANEL CPT-4: 93831        2020

 

                    ROUTINE VENIPUNCTURE CPT-4: 86263        2019

 

                    LIPID PANEL         CPT-4: 53841        2019

 

                    FLU VACC PRSV FREE INC ANTIG 65 AND OLDER CPT-4: 10635      

  10/22/2019

 

                    FLU VACC PRSV FREE INC ANTIG 65 AND OLDER CPT-4: 57096      

  10/22/2019

 

                    ADMIN INFLUENZA VIRUS VAC CPT-4:         10/22/2019

 

                    COMPREHEN METABOLIC PANEL CPT-4: 12287        10/11/2019

 

                    ROUTINE VENIPUNCTURE CPT-4: 47204        10/11/2019

 

                    ROUTINE VENIPUNCTURE CPT-4: 67205        06/10/2019

 

                    ASSAY THYROID STIM HORMONE CPT-4: 70381        06/10/2019

 

                    COMPREHEN METABOLIC PANEL CPT-4: 24542        06/10/2019

 

                    COMPLETE CBC W/AUTO DIFF WBC CPT-4: 32912        06/10/2019

 

                    URINALYSIS NONAUTO W/O SCOPE CPT-4: 25235        2019

 

                    URINE CULTURE/ COLONY COUNT CPT-4: 00815        2019

 

                    URINE CULTURE/ COLONY COUNT CPT-4: 82503        10/02/2018

 

                    ROUTINE VENIPUNCTURE CPT-4: 85213        2018

 

                    ASSAY OF FREE THYROXINE CPT-4: 27068        2018

 

                    ASSAY THYROID STIM HORMONE CPT-4: 79227        2018

 

                    COMPLETE CBC W/AUTO DIFF WBC CPT-4: 62624        2018

 

                    METABOLIC PANEL TOTAL CA CPT-4: 28646        2018

 

                    FLU VACC PRSV FREE INC ANTIG 65 AND OLDER CPT-4: 06530      

  2018

 

                    ASSAY, GLUCOSE, BLOOD QUANT CPT-4: 42166        2018

 

                    ADMIN INFLUENZA VIRUS VAC CPT-4:         2018

 

                    ROUTINE VENIPUNCTURE CPT-4: 70612        2018

 

                    COMPREHEN METABOLIC PANEL CPT-4: 51768        2018

 

                    COMPLETE CBC W/AUTO DIFF WBC CPT-4: 73201        2018

 

                    A1C HPLC            CPT-4: 13171        2018

 

                    ASSAY OF TROPONIN QUANT CPT-4: 02391        2018

 

                    LIPID PANEL         CPT-4: 39751        2018

 

                    THER/PROPH/DIAG INJ SC/IM CPT-4: 45935        2018

 

                    TRIAMCINOLONE ACET INJ NOS CPT-4:         2018

 

                    URINALYSIS NONAUTO W/O SCOPE CPT-4: 50601        2018

 

                    URINE CULTURE/ COLONY COUNT CPT-4: 44626        2018

 

                    FLU VACC PRSV FREE INC ANTIG 65 AND OLDER CPT-4: 46427      

  10/06/2017

 

                    ADMIN INFLUENZA VIRUS VAC CPT-4:         10/06/2017

 

                    ROUTINE VENIPUNCTURE CPT-4: 40953        2017

 

                    COMPREHEN METABOLIC PANEL CPT-4: 19056        2017

 

                    COMPLETE CBC W/AUTO DIFF WBC CPT-4: 73663        2017

 

                    LIPID PANEL         CPT-4: 31269        2017

 

                    A1C HPLC            CPT-4: 46744        2017

 

                    ASSAY THYROID STIM HORMONE CPT-4: 44092        2017

 

                    ROUTINE VENIPUNCTURE CPT-4: 41916        2017

 

                    ASSAY THYROID STIM HORMONE CPT-4: 49090        2017

 

                    COMPREHEN METABOLIC PANEL CPT-4: 31653        2017

 

                    COMPLETE CBC W/AUTO DIFF WBC CPT-4: 01585        2017

 

                    A1C HPLC            CPT-4: 16899        2017

 

                    FLU VACC PRSV FREE INC ANTIG 65 AND OLDER CPT-4: 82941      

  10/07/2016

 

                    ADMIN INFLUENZA VIRUS VAC CPT-4:         10/07/2016

 

                    ROUTINE VENIPUNCTURE CPT-4: 46124        2016

 

                    ASSAY OF FREE THYROXINE CPT-4: 73533        2016

 

                    ASSAY THYROID STIM HORMONE CPT-4: 15152        2016

 

                    COMPREHEN METABOLIC PANEL CPT-4: 25075        2016

 

                    COMPLETE CBC W/AUTO DIFF WBC CPT-4: 59176        2016

 

                    LIPID PANEL         CPT-4: 46098        2016

 

                    A1C HPLC            CPT-4: 87148        2016

 

                    URINALYSIS NONAUTO W/O SCOPE CPT-4: 99596        2016

 

                    ROUTINE VENIPUNCTURE CPT-4: 41140        2015

 

                    METABOLIC PANEL TOTAL CA CPT-4: 90066        2015

 

                    PRESCRIP TRANSMIT VIA ERX SY CPT-4:         2015

 

                    FLU VACC PRSV FREE INC ANTIG 65 AND OLDER CPT-4: 02572      

  10/16/2015

 

                    ADMIN INFLUENZA VIRUS VAC CPT-4:         10/16/2015

 

                    URINALYSIS NONAUTO W/O SCOPE CPT-4: 21406        09/15/2015

 

                    URINE CULTURE/ COLONY COUNT CPT-4: 30715        09/15/2015

 

                    ROUTINE VENIPUNCTURE CPT-4: 67176        09/10/2015

 

                    ASSAY OF FREE THYROXINE CPT-4: 74586        09/10/2015

 

                    ASSAY THYROID STIM HORMONE CPT-4: 21574        09/10/2015

 

                    COMPREHEN METABOLIC PANEL CPT-4: 70498        09/10/2015

 

                    COMPLETE CBC W/AUTO DIFF WBC CPT-4: 68652        09/10/2015

 

                    LIPID PANEL         CPT-4: 25617        09/10/2015

 

                    A1C HPLC            CPT-4: 02546        09/10/2015

 

                    CERUM REMOVAL       CPT-4: 89551        2015

 

                    PRESCRIP TRANSMIT VIA ERX SY CPT-4:         2015

 

                    FLUZONE, 5ML (Medicare) CPT-4:         10/17/2014

 

                    ADMIN INFLUENZA VIRUS VAC CPT-4:         10/17/2014

 

                    PRESCRIP TRANSMIT VIA ERX SY CPT-4:         2014

 

                    PRESCRIP TRANSMIT VIA ERX SY CPT-4:         2014

 

                    PRESCRIP TRANSMIT VIA ERX SY CPT-4:         2014

 

                    ROUTINE VENIPUNCTURE CPT-4: 47772        2014

 

                    ASSAY OF FREE THYROXINE CPT-4: 45490        2014

 

                    ASSAY THYROID STIM HORMONE CPT-4: 95777        2014

 

                    COMPREHEN METABOLIC PANEL CPT-4: 17144        2014

 

                    COMPLETE CBC W/AUTO DIFF WBC CPT-4: 19841        2014

 

                    LIPID PANEL         CPT-4: 10027        2014

 

                    A1C HPLC            CPT-4: 02321        2014

 

                    VITAMIN B 12 FOLIC ACID CPT-4: 51141|93111  2014

 

                    PRESCRIP TRANSMIT VIA ERX SY CPT-4:         2014

 

                    PRESCRIP TRANSMIT VIA ERX SY CPT-4:         10/15/2013

 

                    FLUZONE, 5ML (Medicare) CPT-4:         2013

 

                    ADMIN INFLUENZA VIRUS VAC CPT-4:         2013

 

                    PRESCRIP TRANSMIT VIA ERX SY CPT-4:         2013

 

                    PRESCRIP TRANSMIT VIA ERX SY CPT-4:         05/10/2013

 

                    ROUTINE VENIPUNCTURE CPT-4: 07294        2012

 

                    ASSAY OF FREE THYROXINE CPT-4: 25623        2012

 

                    ASSAY THYROID STIM HORMONE CPT-4: 33790        2012

 

                    COMPREHEN METABOLIC PANEL CPT-4: 95802        2012

 

                    COMPLETE CBC W/AUTO DIFF WBC CPT-4: 11875        2012

 

                    LIPID PANEL         CPT-4: 40886        2012

 

                    A1C GLYCOSYLATED HEMOGLOBIN TEST CPT-4: 41068        

012

 

                    CERUM REMOVAL       CPT-4: 15446        2012

 

                    PRESCRIP TRANSMIT VIA ERX SY CPT-4:         2012

 

                    PRESCRIP TRANSMIT VIA ERX SY CPT-4:         10/10/2012

 

                    FLUZONE, 5ML (Medicare) CPT-4:         2012

 

                    ADMIN INFLUENZA VIRUS VAC CPT-4:         2012

 

                    ASSAY, GLUCOSE, BLOOD QUANT CPT-4: 58991        2012

 

                    URINALYSIS NONAUTO W/O SCOPE CPT-4: 46139        2012

 

                    URINE CULTURE/ COLONY COUNT CPT-4: 85972        2012

 

                    ROUTINE VENIPUNCTURE CPT-4: 52113        2012

 

                    ASSAY OF FREE THYROXINE CPT-4: 56966        2012

 

                    ASSAY THYROID STIM HORMONE CPT-4: 07737        2012

 

                    COMPREHEN METABOLIC PANEL CPT-4: 27598        2012

 

                    COMPLETE CBC W/AUTO DIFF WBC CPT-4: 75427        2012

 

                    LIPID PANEL         CPT-4: 56832        2012

 

                    ASSAY OF INSULIN    CPT-4: 48242        2012

 

                    A1C GLYCOSYLATED HEMOGLOBIN TEST CPT-4: 51925        

012

 

                    DRAIN/INJECT JOINT/BURSA CPT-4: 29354        2012

 

                    METHYLPREDNISOLONE 40 MG INJ CPT-4:         2012

 

                    TRIAMCINOLONE ACET INJ NOS CPT-4:         2012

 

                    PRESCRIP TRANSMIT VIA ERX SY CPT-4:         2012

 

                    PRESCRIP TRANSMIT VIA ERX SY CPT-4:         2012

 

                    METHYLPREDNISOLONE 40 MG INJ CPT-4:         2012

 

                    DRAIN/INJECT JOINT/BURSA CPT-4: 90754        2012

 

                    TRIAMCINOLONE ACET INJ NOS CPT-4:         2012

 

                    PRESCRIP TRANSMIT VIA ERX SY CPT-4:         2012

 

                    ROUTINE VENIPUNCTURE CPT-4: 77472        2012

 

                    ASSAY OF FREE THYROXINE CPT-4: 61976        2012

 

                    ASSAY THYROID STIM HORMONE CPT-4: 16487        2012

 

                    COMPREHEN METABOLIC PANEL CPT-4: 41471        2012

 

                    COMPLETE CBC W/AUTO DIFF WBC CPT-4: 81094        2012

 

                    LIPID PANEL         CPT-4: 45169        2012

 

                    PRESCRIP TRANSMIT VIA ERX SY CPT-4:         2012

 

                    CERUM REMOVAL       CPT-4: 32317        2011

 

                    PRESCRIP TRANSMIT VIA ERX SY CPT-4:         2011

 

                    FLUZONE, 5ML (Medicare) CPT-4:         10/05/2011

 

                    ADMIN INFLUENZA VIRUS VAC CPT-4:         10/05/2011

 

                    PRESCRIP TRANSMIT VIA ERX SY CPT-4:         2011

 

                    URINALYSIS NONAUTO W/O SCOPE CPT-4: 29562        2011

 

                    URINE CULTURE/ COLONY COUNT CPT-4: 17493        2011

 

                    CUR TOBACCO NON-USER CPT-4:         2011

 

                    ROUTINE VENIPUNCTURE CPT-4: 70429        2011

 

                    COMPLETE CBC W/AUTO DIFF WBC CPT-4: 32947        2011

 

                    COMPREHEN METABOLIC PANEL CPT-4: 88629        2011

 

                    LIPID PANEL         CPT-4: 82697        2011

 

                    ASSAY THYROID STIM HORMONE CPT-4: 35550        2011

 

                    ASSAY OF FREE THYROXINE CPT-4: 13591        2011

 

                    PRESCRIP TRANSMIT VIA ERX SY CPT-4:         2011

 

                    INJ TRIGGER POINT 1/2 MUSCL CPT-4: 64547        2011

 

                    TRIAMCINOLONE ACET INJ NOS CPT-4:         2011

 

                    METHYLPREDNISOLONE 40 MG INJ CPT-4:         2011

 

                    THER/PROPH/DIAG INJ SC/IM CPT-4: 22930        2011

 

                    KETOROLAC TROMETHAMINE INJ CPT-4:         2011

 

                    PRESCRIP TRANSMIT VIA ERX SY CPT-4:         2010

 

                    FLU VACCINE 3 YRS & > IM UP 64 CPT-4: 17298        10/06/201

0

 

                    ADMIN INFLUENZA VIRUS VAC CPT-4:         10/06/2010

 

                    URINALYSIS NONAUTO W/O SCOPE CPT-4: 68228        2010

 

                    URINE CULTURE/ COLONY COUNT CPT-4: 58812        2010

 

                    PRESCRIP TRANSMIT VIA ERX SY CPT-4:         2010

 

                    THER/PROPH/DIAG INJ SC/IM CPT-4: 15817        2010

 

                    VITAMIN B12 INJECTION CPT-4:         2010

 

                    THER/PROPH/DIAG INJ SC/IM CPT-4: 48674        2010

 

                    VITAMIN B12 INJECTION CPT-4:         2010

 

                    ROUTINE VENIPUNCTURE CPT-4: 35145        2010







Vital Signs





                          Date                      Vital

 

                    2020          Blood Pressure 1: 144/82 Code: 8480-6 He

art Rate 1: 56 bpm

 

                2020      Blood Pressure 1: 154/86 Code: 8480-6 Heart Rate

 1: 64 bpm 

Respiratory Rate: 20 bpm SpO2: 99%           Temperature: 37.1 (C) / 98.7 (F) We

ight: 215 

lbs 

 

             2019   Blood Pressure 1: 140/70 Code: 8480-6 Heart Rate 1: 57

 bpm SpO2: 99%    

Temperature: 35.9 (C) / 96.6 (F)        Weight: 215 lbs 

 

                06/10/2019      Blood Pressure 1: 144/70 Code: 8480-6 Heart Rate

 1: 60 bpm 

Respiratory Rate: 20 bpm SpO2: 95%           Temperature: 36.4 (C) / 97.6 (F) We

ight: 214 

lbs 

 

                2019      Blood Pressure 1: 128/78 Code: 8480-6 Heart Rate

 1: 56 bpm 

Respiratory Rate: 20 bpm SpO2: 98%           Temperature: 36.3 (C) / 97.4 (F) We

ight: 213 

lbs 

 

                2018      Blood Pressure 1: 142/60 Code: 8480-6 BMI: 38.2 

Code: 48200-0 Heart 

Rate 1: 48 bpm  Height: 5'2"    Respiratory Rate: 20 bpm SpO2: 98%       Tempera

ture: 36.7

(C) / 98.1 (F)                          Weight: 212 lbs 

 

                2018      Blood Pressure 1: 142/64 Code: 8480-6 BMI: 38.5 

Code: 79501-8 Heart 

Rate 1: 52 bpm  Height: 5'2"    Respiratory Rate: 22 bpm SpO2: 96%       Tempera

ture: 36.1

(C) / 96.9 (F)                          Weight: 214 lbs 

 

                10/02/2018      Blood Pressure 1: 124/80 Code: 8480-6 BMI: 38.3 

Code: 54779-5 Heart 

Rate 1: 68 bpm      Height: 5'2"        Respiratory Rate: 20 bpm Temperature: 36

.3 (C) / 

97.4 (F)                                Weight: 213 lbs 

 

                2018      Blood Pressure 1: 132/78 Code: 8480-6 BMI: 37.6 

Code: 66914-0 Heart 

Rate 1: 68 bpm  Height: 5'2"    Respiratory Rate: 20 bpm SpO2: 97%       Tempera

ture: 36.8

(C) / 98.2 (F)                          Weight: 209 lbs 

 

                2018      Blood Pressure 1: 150/76 Code: 8480-6 BMI: 38.5 

Code: 07061-7 Heart 

Rate 1: 64 bpm  Height: 5'2"    Respiratory Rate: 20 bpm SpO2: 97%       Tempera

ture: 36.2

(C) / 97.2 (F)                          Weight: 214 lbs 

 

                2018      Blood Pressure 1: 122/74 Code: 8480-6 BMI: 38.2 

Code: 28928-4 Heart 

Rate 1: 64 bpm  Height: 5'2"    Respiratory Rate: 18 bpm SpO2: 96%       Tempera

ture: 35.8

(C) / 96.4 (F)                          Weight: 212 lbs 

 

                2018      Blood Pressure 1: 124/78 Code: 8480-6 BMI: 37.8 

Code: 31680-0 Heart 

Rate 1: 76 bpm      Height: 5'2"        Respiratory Rate: 20 bpm Temperature: 36

.8 (C) / 

98.3 (F)                                Weight: 210 lbs 

 

                2018      Blood Pressure 1: 136/70 Code: 8480-6 BMI: 38.0 

Code: 83057-5 Heart 

Rate 1: 68 bpm  Height: 5'2"    Respiratory Rate: 20 bpm SpO2: 97%       Tempera

ture: 36.8

(C) / 98.2 (F)                          Weight: 211 lbs 

 

                2018      Blood Pressure 1: 140/65 Code: 8480-6 Heart Rate

 1: 75 bpm 

Respiratory Rate: 24 bpm SpO2: 95%           Temperature: 37.0 (C) / 98.6 (F) We

ight: 211 

lbs 

 

                2018      Blood Pressure 1: 154/70 Code: 8480-6 BMI: 37.6 

Code: 60726-9 Heart 

Rate 1: 76 bpm  Height: 5'2"    Respiratory Rate: 20 bpm SpO2: 98%       Tempera

ture: 36.9

(C) / 98.5 (F)                          Weight: 209 lbs 

 

                2017      Blood Pressure 1: 156/70 Code: 8480-6 BMI: 37.1 

Code: 44091-0 Heart 

Rate 1: 72 bpm  Height: 5'2"    Respiratory Rate: 20 bpm SpO2: 97%       Tempera

ture: 37.0

(C) / 98.6 (F)                          Weight: 206 lbs 

 

                2017      Blood Pressure 1: 152/78 Code: 8480-6 BMI: 36.8 

Code: 95482-2 Heart 

Rate 1: 78 bpm  Height: 5'2"    Respiratory Rate: 20 bpm SpO2: 98%       Tempera

ture: 36.1

(C) / 97.0 (F)                          Weight: 204 lbs 

 

                2017      Blood Pressure 1: 142/70 Code: 8480-6 BMI: 36.9 

Code: 69993-2 Heart 

Rate 1: 64 bpm  Height: 5'2"    Respiratory Rate: 20 bpm SpO2: 96%       Tempera

ture: 36.5

(C) / 97.7 (F)                          Weight: 205 lbs 

 

                2017      Blood Pressure 1: 142/68 Code: 8480-6 Heart Rate

 1: 88 bpm 

Respiratory Rate: 18 bpm SpO2: 98%           Temperature: 36.3 (C) / 97.3 (F) We

ight: 209 

lbs 

 

                2016      Blood Pressure 1: 130/78 Code: 8480-6 Heart Rate

 1: 68 bpm 

Respiratory Rate: 20 bpm SpO2: 95%           Temperature: 36.4 (C) / 97.6 (F) We

ight: 212 

lbs 

 

                2016      Blood Pressure 1: 122/64 Code: 8480-6 BMI: 39.1 

Code: 84636-1 Heart 

Rate 1: 76 bpm      Height: 5'2"        Respiratory Rate: 20 bpm Temperature: 36

.8 (C) / 

98.2 (F)                                Weight: 217 lbs 

 

                2016      Blood Pressure 1: 144/70 Code: 8480-6 BMI: 39.4 

Code: 32787-5 Heart 

Rate 1: 76 bpm      Height: 5'2"        Respiratory Rate: 20 bpm Temperature: 36

.6 (C) / 

97.9 (F)                                Weight: 219 lbs 

 

                2015      Blood Pressure 1: 152/60 Code: 8480-6 BMI: 39.6 

Code: 44335-0 Heart 

Rate 1: 84 bpm      Height: 5'2"        Respiratory Rate: 20 bpm Temperature: 37

.0 (C) / 

98.6 (F)                                Weight: 220 lbs 

 

                2015      Blood Pressure 1: 146/76 Code: 8480-6 BMI: 39.8 

Code: 20194-5 Heart 

Rate 1: 88 bpm      Height: 5'2"        Respiratory Rate: 20 bpm Temperature: 37

.0 (C) / 

98.6 (F)                                Weight: 221 lbs 

 

                2015      Blood Pressure 1: 132/70 Code: 8480-6 BMI: 39.1 

Code: 53650-7 Heart 

Rate 1: 88 bpm      Height: 5'2"        Respiratory Rate: 20 bpm Temperature: 36

.4 (C) / 

97.6 (F)                                Weight: 217 lbs 

 

                2015      Blood Pressure 1: 132/66 Code: 8480-6 BMI: 39.9 

Code: 32560-8 Heart 

Rate 1: 72 bpm      Height: 5'2"        Respiratory Rate: 20 bpm Temperature: 36

.9 (C) / 

98.4 (F)                                Weight: 218 lbs 

 

                2015      Blood Pressure 1: 136/80 Code: 8480-6 Heart Rate

 1: 76 bpm 

Respiratory Rate: 20 bpm  Temperature: 36.7 (C) / 98.0 (F) Weight: 224 lbs 

 

                2015      Blood Pressure 1: 134/78 Code: 8480-6 BMI: 39.7 

Code: 55234-0 Heart 

Rate 1: 84 bpm      Height: 5'2"        Respiratory Rate: 20 bpm Temperature: 36

.7 (C) / 

98.0 (F)                                Weight: 217 lbs 

 

                2014      Blood Pressure 1: 146/78 Code: 8480-6 BMI: 39.5 

Code: 14630-8 Heart 

Rate 1: 82 bpm      Height: 5'2"        Respiratory Rate: 18 bpm Temperature: 35

.6 (C) / 

96.1 (F)                                Weight: 216 lbs 

 

                2014      Blood Pressure 1: 134/70 Code: 8480-6 BMI: 37.9 

Code: 96850-5 Heart 

Rate 1: 80 bpm      Height: 5'3"        Respiratory Rate: 20 bpm Temperature: 36

.8 (C) / 

98.2 (F)                                Weight: 214 lbs 

 

                2014      Blood Pressure 1: 132/70 Code: 8480-6 BMI: 37.6 

Code: 07558-8 Heart 

Rate 1: 80 bpm      Height: 5'3"        Respiratory Rate: 20 bpm Temperature: 36

.8 (C) / 

98.3 (F)                                Weight: 212 lbs 

 

                2014      Blood Pressure 1: 116/74 Code: 8480-6 Heart Rate

 1: 68 bpm 

Respiratory Rate: 20 bpm  Temperature: 36.2 (C) / 97.1 (F) Weight: 212 lbs 

 

                10/15/2013      Blood Pressure 1: 132/82 Code: 8480-6 BMI: 37.4 

Code: 42965-3 Heart 

Rate 1: 76 bpm      Height: 5'3"        Respiratory Rate: 20 bpm Temperature: 36

.7 (C) / 

98.0 (F)                                Weight: 211 lbs 

 

                    2013          Blood Pressure 1: 130/76 Code: 8480-6 He

art Rate 1: 78 bpm

 

                2013      Blood Pressure 1: 140/82 Code: 8480-6 BMI: 36.8 

Code: 39189-9 Heart 

Rate 1: 66 bpm      Height: 5'3"        Respiratory Rate: 20 bpm Temperature: 36

.1 (C) / 

96.9 (F)                                Weight: 208 lbs 

 

                2013      Blood Pressure 1: 138/80 Code: 8480-6 BMI: 36.4 

Code: 57664-3 Heart 

Rate 1: 72 bpm      Height: 5'4"        Respiratory Rate: 20 bpm Temperature: 36

.7 (C) / 

98.0 (F)                                Weight: 212 lbs 

 

                2013      Blood Pressure 1: 124/70 Code: 8480-6 BMI: 36.9 

Code: 86608-4 Heart 

Rate 1: 60 bpm      Height: 5'4"        Temperature: 36.1 (C) / 97.0 (F) Weight:

 215 lbs 

 

                05/10/2013      Blood Pressure 1: 132/86 Code: 8480-6 BMI: 36.9 

Code: 12083-2 Heart 

Rate 1: 76 bpm      Height: 5'4"        Respiratory Rate: 20 bpm Temperature: 36

.8 (C) / 

98.2 (F)                                Weight: 215 lbs 

 

                2013      Blood Pressure 1: 134/82 Code: 8480-6 BMI: 36.6 

Code: 93749-4 Heart 

Rate 1: 72 bpm      Height: 5'4"        Respiratory Rate: 20 bpm Temperature: 36

.3 (C) / 

97.4 (F)                                Weight: 213 lbs 

 

                2012      Blood Pressure 1: 142/80 Code: 8480-6 BMI: 37.1 

Code: 75354-6 Heart 

Rate 1: 76 bpm      Height: 5'4"        Respiratory Rate: 20 bpm Temperature: 36

.8 (C) / 

98.3 (F)                                Weight: 216 lbs 

 

                10/23/2012      Blood Pressure 1: 128/68 Code: 8480-6 BMI: 37.6 

Code: 28610-5 Heart 

Rate 1: 72 bpm      Height: 5'4"        Respiratory Rate: 20 bpm Temperature: 36

.6 (C) / 

97.9 (F)                                Weight: 219 lbs 

 

                10/10/2012      Blood Pressure 1: 122/70 Code: 8480-6 Heart Rate

 1: 76 bpm 

Respiratory Rate: 20 bpm  Temperature: 36.7 (C) / 98.1 (F) Weight: 218 lbs 

 

                    2012          Blood Pressure 1: 132/80 Code: 8480-6 He

art Rate 1: 84 bpm

 

                2012      Blood Pressure 1: 128/78 Code: 8480-6 BMI: 38.1 

Code: 05151-1 Heart 

Rate 1: 84 bpm      Height: 5'4"        Respiratory Rate: 20 bpm Temperature: 36

.9 (C) / 

98.4 (F)                                Weight: 222 lbs 

 

                2012      Blood Pressure 1: 138/80 Code: 8480-6 BMI: 37.9 

Code: 94824-0 Heart 

Rate 1: 74 bpm      Height: 5'4"        Temperature: 36.1 (C) / 97.0 (F) Weight:

 221 lbs 

 

                2012      Blood Pressure 1: 126/78 Code: 8480-6 BMI: 38.4 

Code: 16075-2 Heart 

Rate 1: 72 bpm      Height: 5'4"        Respiratory Rate: 20 bpm Temperature: 36

.7 (C) / 

98.0 (F)                                Weight: 224 lbs 

 

                2012      Blood Pressure 1: 138/72 Code: 8480-6 BMI: 38.4 

Code: 04641-7 Heart 

Rate 1: 72 bpm      Height: 5'4"        Respiratory Rate: 20 bpm Temperature: 36

.6 (C) / 

97.9 (F)                                Weight: 224 lbs 

 

                2012      Blood Pressure 1: 122/78 Code: 8480-6 BMI: 38.8 

Code: 65359-4 Heart 

Rate 1: 88 bpm      Height: 5'4"        Respiratory Rate: 20 bpm Temperature: 36

.6 (C) / 

97.8 (F)                                Weight: 226 lbs 

 

                2012      Blood Pressure 1: 116/60 Code: 8480-6 BMI: 38.1 

Code: 61780-0 Heart 

Rate 1: 92 bpm      Height: 5'4"        Respiratory Rate: 20 bpm Temperature: 36

.8 (C) / 

98.2 (F)                                Weight: 222 lbs 

 

                2011      Blood Pressure 1: 118/62 Code: 8480-6 BMI: 37.9 

Code: 75476-3 Heart 

Rate 1: 80 bpm      Height: 5'4"        Temperature: 36.5 (C) / 97.7 (F) Weight:

 221 lbs 

 

                2011      Blood Pressure 1: 132/70 Code: 8480-6 Heart Rate

 1: 84 bpm 

Respiratory Rate: 20 bpm  Temperature: 36.7 (C) / 98.0 (F) Weight: 221 lbs 

 

                2011      Blood Pressure 1: 114/72 Code: 8480-6 BMI: 37.6 

Code: 87972-2 Heart 

Rate 1: 76 bpm      Height: 5'4"        Respiratory Rate: 20 bpm Temperature: 36

.8 (C) / 

98.3 (F)                                Weight: 219 lbs 

 

                    2011          Blood Pressure 1: 136/76 Code: 8480-6 Te

mperature: 36.0 (C) / 96.8 

(F)                                     Weight: 219 lbs 8 oz

 

                2011      Blood Pressure 1: 128/80 Code: 8480-6 Heart Rate

 1: 72 bpm 

Temperature: 36.3 (C) / 97.4 (F)        Weight: 218 lbs 

 

                2011      Blood Pressure 1: 126/70 Code: 8480-6 Heart Rate

 1: 72 bpm 

Temperature: 36.7 (C) / 98.0 (F)

 

                    2010          Blood Pressure 1: 126/78 Code: 8480-6 Te

mperature: 36.2 (C) / 97.1 

(F)                                     Weight: 217 lbs 8 oz

 

                2010      Blood Pressure 1: 116/70 Code: 8480-6 Heart Rate

 1: 72 bpm 

Temperature: 36.4 (C) / 97.6 (F)        Weight: 222 lbs 

 

                2010      Blood Pressure 1: 114/78 Code: 8480-6 Heart Rate

 1: 72 bpm 

Temperature: 37.1 (C) / 98.8 (F)        Weight: 225 lbs 

 

                2010      Blood Pressure 1: 128/78 Code: 8480-6 Heart Rate

 1: 76 bpm 

Temperature: 36.6 (C) / 97.8 (F)        Weight: 224 lbs 

 

                2010      Blood Pressure 1: 116/78 Code: 8480-6 Heart Rate

 1: 80 bpm 

Temperature: 36.4 (C) / 97.6 (F)        Weight: 226 lbs 

 

                2010      Blood Pressure 1: 120/70 Code: 8480-6 BMI: 38.3 

Code: 27929-4 Heart 

Rate 1: 88 bpm      Height: 5'5"        Temperature: 36.4 (C) / 97.6 (F) Weight:

 230 lbs 







Functional Status

No Functional Status data



Reason For Visit





                    Reason For Visit    Effective Dates     Notes

 

                    blood pressure check 2020           

 

                    high blood pressure 2020           

 

                    lab draw            2019           

 

                    lab draw            10/11/2019           

 

                    hearing loss        2019          left ear

 

                    follow up           06/10/2019           

 

                    follow up           2019          Patient has upcoming

 appointment with Dr Claire and carotid 

dopplers tomorrow

 

                    follow up           2018          Patient had heart ca

th on 10-30-18 and one of the bypass 

veins in spasming

 

                    follow up           2018          4 Week

 

                    follow up           10/02/2018           

 

                    follow up           2018           

 

                    high blood pressure 2018          Dr Claire has ordered

 holter monitor and 

hydralazine 50mg PRN

 

                    dizziness           2018          On  morning darren delvalle woke up and went to the bathroom 

and after lying down became very dizzy. Patient has hx of vertigo so didn't 
think anything of it. She usually just has them intermittently, but had more 
that day. While at [employer] began to feel very ill and became very shaky. She got 
home and sat in the recliner and had the jittery/nervous feeling. Later that 
night it finally resolved. Patient feels like she had a spell like this around 
the  and thought it was due to extreme heat exhaustion.

 

                    follow up           2018           

 

                    back pain           2018           

 

                    otalgia             2018           

 

                    back pain           2018           

 

                    injection(s)        10/06/2017          flu shot

 

                    lab draw            2017           

 

                    follow up           2017           

 

                    pelvic pain         2017          Patient states pain 

started in May with a "Constant Ache"

to left inguinal area. Patient states at that time pain was intermittent. Then, 
approximately 2nd week  of  pain worsened and radiates to left low back 
area.

 

                    lab draw            2017           

 

                    hyperglycemia       2017           

 

                    cough               2017           

 

                    otalgia             2016           

 

                    injection(s)        10/07/2016          Influenza

 

                    lab draw            2016           

 

                    constipation        2016           

 

                    flank pain          2016           

 

                    follow up           2015          3mo fwup

 

                    injection(s)        10/16/2015          Influenza

 

                    acute renal failure 09/15/2015           

 

                    lab draw            09/10/2015           

 

                    fatigue             2015           

 

                    otalgia             2015           

 

                    pain, limb          2015           

 

                    follow up           2015          [facility] fwup

 

                    high blood pressure 2015           

 

                    injection(s)        10/17/2014          flu shot

 

                    sinusitis           2014           

 

                    high blood pressure 2014           

 

                    cough               2014          Was panfilo at Dr Tyree teixeira's office so he started he on amlodipine 

10mg but seems to be dragging her down. Patient decreased to 1/2 tablet this 
last week

 

                    lab draw            2014           

 

                    cough               2014           

 

                    cough               10/15/2013           

 

                    high blood pressure 2013           

 

                    follow up           2013          ER

 

                    dizziness           2013           

 

                    follow up           2013           

 

                    otalgia             05/10/2013           

 

                    breast complaint    2013           

 

                    lab draw            2012           

 

                    follow up           2012          2mo fwup

 

                    follow up           10/23/2012          2wk fwup

 

                    gas and bloating    10/10/2012          Dr Claire has her mon

itoring because was high in his 

office at last visit

 

                    injection(s)        2012           

 

                    dizziness           2012           

 

                    dizziness           2012           

 

                    lab draw            2012           

 

                    muscle weakness     2012          concerns of simvasta

tin with fatigue and muscle 

weakness

 

                    leg pain/sciatica   2012           

 

                    hip pain            2012           

 

                    back pain           2012          has tried ice pack, 

muscle relaxers, and moist heat

 

                    back pain           2012           

 

                    fatigue             2012          in hands/feet

 

                    otalgia             2011           

 

                    follow up           2011          2wk fwup

 

                    back pain           2011          thinks ulcer may hav

e returned

 

                    lab draw            2011           

 

                    dizziness           2011          Left ear and facial 

pain, right ear pain 

 

                    follow up           2011          1wk fwup

 

                    hip pain            2011          feels very draggy, f

atigue, BP 80/63, normally running 

100's/60's

 

                    chills              2010           

 

                    injection(s)        10/06/2010          flu shot

 

                    follow up           2010          6wk fwup, had HH rep

aired and is starting to feel better, 

started on reglan 10mg tid

 

                    follow up           2010          hosp fwup

 

                    follow up           2010          1mo fwup, saw Dr Danna du and hasn't gave cardiac clearance 

yet for HH repair, did have chemical stress test yesterday and waiting on 
results

 

                    follow up           2010          from EGD/colonoscopy

--has Hiatal Hernia and wants to do 

repair

 

                    follow up           2010           







Encounters





             Encounter    Performer    Location     Codes        Date

 

                                        ([id number]) OFFICE/OUTPATIENT VISIT EST

Diagnosis: Essential hypertension[ICD10: I10]

Diagnosis: Palpitations[ICD10: R00.2]

Diagnosis: Stress reaction[ICD10: F43.0] Akanksha DRIVER [facility]            CPT-4: 45362              2020

 

                                        (14056) NURSE/OUTPATIENT VISIT EST

Diagnosis: Mixed hyperlipidemia[ICD10: E78.2] Akanksha DRIVER [facility]            CPT-4: 23419              2019

 

                                        (72530) NURSE/OUTPATIENT VISIT EST

Diagnosis: FLU VACCINE[ICD10: Z23] Akanksha KEY [facility]                       CPT-4: 15828              10/22/2019

 

                                        (53457) NURSE/OUTPATIENT VISIT EST

Diagnosis: Chronic kidney disease, stage 1[ICD10: N18.1] Akanksha DRIVER [facility] CPT-4: 16613              10/11/2019

 

                                        (15429) OFFICE/OUTPATIENT VISIT EST

Diagnosis: Acute serous otitis media, left ear[ICD10: H65.02] Nat DRIVER [facility] CPT-4: 16332              2019

 

                                        ([id number]) OFFICE/OUTPATIENT VISIT EST

Diagnosis: Essential (primary) hypertension[ICD10: I10]

Diagnosis: Coronary atherosclerosis due to calcified coronary lesion[ICD10: 
I25.84]

Diagnosis: Hypoglycemia, unspecified[ICD10: E16.2]

Diagnosis: Other fatigue[ICD10: R53.83]

Diagnosis: Urinary tract infection, site not specified[ICD10: N39.0] Akanksha DRIVER [facility] CPT-4: 19940        06/10/2019

 

                                        ([id number]) OFFICE/OUTPATIENT VISIT EST

Diagnosis: Essential (primary) hypertension[ICD10: I10]

Diagnosis: Gastro-esophageal reflux disease without esophagitis[ICD10: K21.9]

Diagnosis: Urinary tract infection, site not specified[ICD10: N39.0] Akanksha DRIVER DO [facility] CPT-4: 17829        2019

 

                                        ([id number]) OFFICE/OUTPATIENT VISIT EST

Diagnosis: Gastro-esophageal reflux disease without esophagitis[ICD10: K21.9]

Diagnosis: Epigastric pain[ICD10: R10.13] [employer]            CPT-4: 41277              2018

 

                                        (42966) OFFICE/OUTPATIENT VISIT EST

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

Diagnosis: Essential (primary) hypertension[ICD10: I10]

Diagnosis: Generalized anxiety disorder[ICD10: F41.1]

Diagnosis: Gastro-esophageal reflux disease without esophagitis[ICD10: K21.9] 

Akanksha DRIVER DO [facility] CPT-4: 83333        2018

 

                                        OFFICE/OUTPATIENT VISIT EST

Diagnosis: Gastro-esophageal reflux disease without esophagitis[ICD10: K21.9]

Diagnosis: Palpitations[ICD10: R00.2]

Diagnosis: Urinary tract infection, site not specified[ICD10: N39.0]

Diagnosis: Generalized anxiety disorder[ICD10: F41.1] Akanksha DRIVER [employer] [facility] CPT-4: 53433              10/02/2018

 

                                        ([id number]) NURSE/OUTPATIENT VISIT EST

Diagnosis: Coronary atherosclerosis due to calcified coronary lesion[ICD10: 
I25.84]

Diagnosis: Hypoglycemia, unspecified[ICD10: E16.2]

Diagnosis: Dizziness and giddiness[ICD10: R42]

Diagnosis: Occlusion and stenosis of bilateral carotid arteries[ICD10: I65.23] 

Akanksha DRIVER DO [facility] CPT-4: 46887        2018

 

                                        OFFICE/OUTPATIENT VISIT EST

Diagnosis: Epigastric pain[ICD10: R10.13]

Diagnosis: Generalized anxiety disorder[ICD10: F41.1]

Diagnosis: FLU VACCINE[ICD10: Z23] Akanksha KEY [employer] 

[facility]                       CPT-4: 53309              2018

 

                                        ([id number]) OFFICE/OUTPATIENT VISIT EST

Diagnosis: Essential (primary) hypertension[ICD10: I10]

Diagnosis: Hypoglycemia, unspecified[ICD10: E16.2]

Diagnosis: Gastro-esophageal reflux disease without esophagitis[ICD10: K21.9] 

Akanksha Xiangrodo BAUMAN OLIVIA DRIVER DO [facility] CPT-4: 00870        2018

 

                                        (79910) OFFICE/OUTPATIENT VISIT EST

Diagnosis: Dizziness and giddiness[ICD10: R42] Nat DRIVER DO [facility]            CPT-4: 52537              2018

 

                                        ([id number]) OFFICE/OUTPATIENT VISIT EST

Diagnosis: Cervicalgia[ICD10: M54.2]

Diagnosis: Other spondylosis with radiculopathy, cervical region[ICD10: M47.22] 

Akanksha Xiangrodo BAUMAN SAramis KRISTEL CASE [facility] CPT-4: 95421        2018

 

                                        ([id number]) OFFICE/OUTPATIENT VISIT EST

Diagnosis: Hypoglycemia, unspecified[ICD10: E16.2]

Diagnosis: Other spondylosis with radiculopathy, cervical region[ICD10: M47.22]

Diagnosis: Vertigo of central origin, bilateral[ICD10: H81.43]

Diagnosis: Cervicocranial syndrome[ICD10: M53.0] Akanksha Xiangndangela        DORITAANN MARIE SEENE 

SAramis KRISTEL [employer] [facility]         CPT-4: 27866              2018

 

                                        ([id number]) OFFICE/OUTPATIENT VISIT EST

Diagnosis: Benign paroxysmal vertigo, bilateral[ICD10: H81.13]

Diagnosis: Otalgia, bilateral[ICD10: H92.03] Nat DRIVER [employer] [facility]            CPT-4: 62967              2018

 

                                        ([id number]) OFFICE/OUTPATIENT VISIT EST

Diagnosis: Low back pain[ICD10: M54.5]

Diagnosis: Radiculopathy, lumbosacral region[ICD10: M54.17]

Diagnosis: Left lower quadrant pain[ICD10: R10.32] Akanksha BAKER KRISTEL [employer] [facility]         CPT-4: 97855              2018

 

                                        (64015) OFFICE/OUTPATIENT VISIT EST

Diagnosis: FLU VACCINE[ICD10: Z23] Akanksha BAUMAN SAramis OREND

[facility]                       CPT-4: 19761              10/06/2017

 

                                        ([id number]) OFFICE/OUTPATIENT VISIT EST

Diagnosis: Mixed hyperlipidemia[ICD10: E78.2]

Diagnosis: Essential (primary) hypertension[ICD10: I10]

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

Diagnosis: Impaired fasting glucose[ICD10: R73.01]

Diagnosis: Other fatigue[ICD10: R53.83] [facility]                    CPT-4: 14376              2017

 

                                        (42183) OFFICE/OUTPATIENT VISIT EST

Diagnosis: Pain in thoracic spine[ICD10: M54.6]

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 

[facility] CPT-[facility]: 14957        2017

 

                                        (71994) OFFICE/OUTPATIENT VISIT EST

Diagnosis: Left lower quadrant pain[ICD10: R10.32]

Diagnosis: Low back pain[ICD10: M54.5] Akanksha SYKES 

[facility]                    CPT-4: 30622              2017

 

                                        (62136) OFFICE/OUTPATIENT VISIT EST

Diagnosis: Mixed hyperlipidemia[ICD10: E78.2]

Diagnosis: Essential (primary) hypertension[ICD10: I10]

Diagnosis: Hypoglycemia, unspecified[ICD10: E16.2] [facility]         CPT-4: 04036              2017

 

                                        ([id number]) OFFICE/OUTPATIENT VISIT EST

Diagnosis: Impaired fasting glucose[ICD10: R73.01]

Diagnosis: Mastodynia[ICD10: N64.4]

Diagnosis: Mixed hyperlipidemia[ICD10: E78.2]

Diagnosis: Essential (primary) hypertension[ICD10: I10]

Diagnosis: Other fatigue[ICD10: R53.83] [facility]                    CPT-4: 75332              2017

 

                                        (75105) OFFICE/OUTPATIENT VISIT EST

Diagnosis: Acute upper respiratory infection, unspecified[ICD10: J06.9] Luba Stevenson              [facility] CPT-4: 63376        2017

 

                                        (51043) OFFICE/OUTPATIENT VISIT EST

Diagnosis: Acute mastoiditis without complications, right ear[ICD10: H70.001] 

Akanksha DRIVER DO [facility] CPT-4: 56001        2016

 

                                        (57807) OFFICE/OUTPATIENT VISIT EST

Diagnosis: FLU VACCINE[ICD10: Z23] Akanksha KEY DO 

[facility]                       CPT-4: 45525              10/07/2016

 

                                        (38405) OFFICE/OUTPATIENT VISIT EST

Diagnosis: Mixed hyperlipidemia[ICD10: E78.2]

Diagnosis: Essential (primary) hypertension[ICD10: I10]

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

Diagnosis: Impaired fasting glucose[ICD10: R73.01] Akanksha DRIVER DO [facility]         CPT-4: 41658              2016

 

                                        (96908) OFFICE/OUTPATIENT VISIT EST

Diagnosis: Constipation, unspecified[ICD10: K59.00] Akanksha DRIVER DO [facility] CPT-4: 32732              2016

 

                                        (06475) OFFICE/OUTPATIENT VISIT EST

Diagnosis: Essential (primary) hypertension[ICD10: I10]

Diagnosis: Mixed hyperlipidemia[ICD10: E78.2]

Diagnosis: Chronic kidney disease, stage 1[ICD10: N18.1] Akanksha DRIVER DO [facility] CPT-4: 46589              2016

 

                                        (63357) OFFICE/OUTPATIENT VISIT EST

Diagnosis: Muscle weakness (generalized)[ICD10: M62.81]

Diagnosis: Dizziness and giddiness[ICD10: R42]

Diagnosis: Essential (primary) hypertension[ICD10: I10]

Diagnosis: History of falling[ICD10: Z91.81] Akanksha Xiangndangela        NYARAMOS DRIVER DO [facility]            CPT-4: 57050              2015

 

                                        (55060) OFFICE/OUTPATIENT VISIT EST

Diagnosis: FLU VACCINE[ICD10: Z23] Akanksha HIGHTOWERLINE OLIVIA KEY DO 

[facility]                       CPT-4: 01061              10/16/2015

 

                                        (15667) OFFICE/OUTPATIENT VISIT EST

Diagnosis: Acute renal failure[ICD9: 584.9]

Diagnosis: POLYURIA[ICD9: 788.42] Akanksha HIGHTOWERLINE OLIVIA LANCE [facility]                       CPT-4: 55331              09/15/2015

 

                                        (31664) OFFICE/OUTPATIENT VISIT EST

Diagnosis: HYPERLIPIDEMIA NEC/NOS[ICD9: 272.4]

Diagnosis: HYPERTENSION[ICD9: 401.9]

Diagnosis: CAD[ICD9: 414.00]

Diagnosis: Hyperglycemia[ICD9: 790.29]

Diagnosis: MALAISE AND FATIGUE[ICD9: 780.79] Akanksha Otoolendangela KEVINQUELIN

SHERRY DRIVER [employer] [facility]            CPT-4: 01429              09/10/2015

 

                                        (35327) OFFICE/OUTPATIENT VISIT EST

Diagnosis: MALAISE AND FATIGUE[ICD9: 780.79]

Diagnosis: HYPOGLYCEMIA[ICD9: 251.2]

Diagnosis: Grieving[ICD9: 309.0]

Diagnosis: ABDOMINAL PAIN[ICD9: 789.00] Akanksha Xiangndangela HIGHTOWERLINE SAramis 

KRISTEL

[facility]                    CPT-4: 87780              2015

 

                                        OFFICE/OUTPATIENT VISIT EST

Diagnosis: CERUMEN IMPACTION[ICD9: 380.4]

Diagnosis: EUSTACHIAN TUBE DYSFUNCTION[ICD9: 381.81] Berthasa Mon      

AKANKSHA OLIVIA DRIVER [facility] CPT-4: 93759              2015

 

                                        (23565) OFFICE/OUTPATIENT VISIT EST

Diagnosis: Leg pain[ICD9: 729.5]

Diagnosis: SUPERFIC PHLEBITIS-LEG[ICD9: 451.0] Akanksha ROBERTSON SAramis 

KRISTEL [employer] [facility]            CPT-4: 91165              2015

 

                                        (57373) OFFICE/OUTPATIENT VISIT EST

Diagnosis: Thoracic back pain[ICD9: 724.1]

Diagnosis: SPASM OF MUSCLE[ICD9: 728.85] Akanksha Xiangndangela HIGHTOWERLINE SAramis

 

KRISTEL CASE [facility]            CPT-4: 41355              2015

 

                                        (85772) OFFICE/OUTPATIENT VISIT EST

Diagnosis: DIZZINESS/VERTIGO[ICD9: 780.4]

Diagnosis: Benign positional vertigo[ICD9: 386.11]

Diagnosis: HYPERTENSION[ICD9: 401.9]

Diagnosis: Suspicious nevus[ICD9: 238.2] Akanksha DRIVER [facility]            CPT-4: 31980              2015

 

                                        ([id number]) OFFICE/OUTPATIENT VISIT EST

Diagnosis: FLU VACCINE[ICD10: Z23] Akanksha KEY [facility]                       CPT-4: 39959              10/17/2014

 

                                        OFFICE/OUTPATIENT VISIT EST

Diagnosis: SINUSITIS, ACUTE[ICD9: 461.9]

Diagnosis: EUSTACHIAN TUBE DYSFUNCTION[ICD9: 381.81] Bertha DRIVER [facility] CPT-4: 80154              2014

 

                                        (00926) OFFICE/OUTPATIENT VISIT EST

Diagnosis: DIZZINESS/VERTIGO[ICD9: 780.4]

Diagnosis: HYPERTENSION[ICD9: 401.9] [facility]                       CPT-4: 48258              2014

 

                                        ([id number]) OFFICE/OUTPATIENT VISIT EST

Diagnosis: HYPERTENSION[ICD9: 401.9]

Diagnosis: MALAISE AND FATIGUE[ICD9: 780.79]

Diagnosis: SINUSITIS, ACUTE[ICD9: 461.9] Akanksha DRIVER [facility]            CPT-4: 37705              2014

 

                                        (48490) OFFICE/OUTPATIENT VISIT EST

Diagnosis: HYPERLIPIDEMIA NEC/NOS[ICD9: 272.4]

Diagnosis: HYPERTENSION[ICD9: 401.9]

Diagnosis: B12 DEFIC ANEMIA NEC[ICD9: 281.1]

Diagnosis: HYPOGLYCEMIA[ICD9: 251.2] Akanksha MEJIA [facility]                       CPT-4: 08106              2014

 

                                        OFFICE/OUTPATIENT VISIT EST

Diagnosis: COUGH[ICD9: 786.2] Bertha DRIVER [facility] 

CPT-4: 86250                            2014

 

                                        OFFICE/OUTPATIENT VISIT EST

Diagnosis: COUGH[ICD9: 786.2]

Diagnosis: URI, ACUTE[ICD9: 465.9] Bertha KEY [facility]                       CPT-4: 49754              10/15/2013

 

                                        (51362) OFFICE/OUTPATIENT VISIT EST

Diagnosis: HYPERTENSION[ICD9: 401.9]

Diagnosis: CEPHALGIA[ICD9: 784.0]

Diagnosis: ANXIETY STATE NOS[ICD9: 300.00]

Diagnosis: FLU VACCINE[ICD9: V04.81] Akanksha MEJIA [facility]                       CPT-4: 09254              2013

 

                                        (66013) OFFICE/OUTPATIENT VISIT EST

Diagnosis: DIZZINESS/VERTIGO[ICD9: 780.4]

Diagnosis: SINUSITIS, ACUTE[ICD9: 461.9]

Diagnosis: Benign positional vertigo[ICD9: 386.11] Akanksha IZQUIERDOMARCY

[facility]amis [facility]         CPT-4: 59656              2013

 

                                        OFFICE/OUTPATIENT VISIT EST

Diagnosis: OTITIS MEDIA NOS[ICD9: 382.9] Akanksha HIGHTOWERLINE SAramis

 

[facility]            CPT-4: 03042              2013

 

                                        OFFICE/OUTPATIENT VISIT EST

Diagnosis: Perforation of ear drum[ICD9: 384.20]

Diagnosis: SINUSITIS, ACUTE[ICD9: 461.9] Akanksha HIGHTOWERLINE SAramis

 

[facility]            CPT-4: 69104              05/10/2013

 

                                        (79797) OFFICE/OUTPATIENT VISIT EST

Diagnosis: Mastalgia[ICD9: 611.71] Akanksha CHEATHAMAramis TJ

[facility]                       CPT-4: 79684              2013

 

                                        (68139) OFFICE/OUTPATIENT VISIT EST

Diagnosis: HYPERLIPIDEMIA NEC/NOS[ICD9: 272.4]

Diagnosis: HYPERTENSION[ICD9: 401.9]

Diagnosis: DIZZINESS/VERTIGO[ICD9: 780.4]

Diagnosis: Hyperglycemia[ICD9: 790.29]

Diagnosis: MALAISE AND FATIGUE[ICD9: 780.79] Akanksha Xiangrodo TEIXEIRA SAramis 

[facility]            CPT-4: 84965              2012

 

                                        (85830) OFFICE/OUTPATIENT VISIT EST

Diagnosis: EUSTACHIAN TUBE DYSFUNCTION[ICD9: 381.81]

Diagnosis: DIZZINESS/VERTIGO[ICD9: 780.4]

Diagnosis: ABDOMINAL PAIN[ICD9: 789.00]

Diagnosis: GERD[ICD9: 530.81]

Diagnosis: CERUMEN IMPACTION[ICD9: 380.4] [facility]ha [facility]            CPT-4: 20395              2012

 

                                        OFFICE/OUTPATIENT VISIT EST

Diagnosis: ABDOMINAL PAIN[ICD9: 789.00]

Diagnosis: DYSPEPSIA[ICD9: 536.8] Akanksha LANCE [facility]                       CPT-4: 55949              10/23/2012

 

                                        (07351) OFFICE/OUTPATIENT VISIT EST

Diagnosis: ABDOMINAL PAIN[ICD9: 789.00]

Diagnosis: DYSPEPSIA[ICD9: 536.8]

Diagnosis: IBS[ICD9: 564.1] Akanksha DRIVER [facility] CPT

-

4: 86376                                10/10/2012

 

                                        OFFICE/OUTPATIENT VISIT EST

Diagnosis: DIZZINESS/VERTIGO[ICD9: 780.4]

Diagnosis: HYPOGLYCEMIA[ICD9: 251.2] Akanksha MEJIA [facility]                       CPT-4: 52464              2012

 

                                        (88265) OFFICE/OUTPATIENT VISIT EST

Diagnosis: URINARY TRACT INFECTION[ICD9: 599.0] Akanksha DRIVER [facility]            CPT-4: 87667              2012

 

                                        (72941) OFFICE/OUTPATIENT VISIT EST

Diagnosis: HYPERLIPIDEMIA NEC/NOS[ICD9: 272.4]

Diagnosis: HYPERTENSION[ICD9: 401.9]

Diagnosis: MALAISE AND FATIGUE[ICD9: 780.79]

Diagnosis: DIZZINESS/VERTIGO[ICD9: 780.4] [facility]anksha [facility]            CPT-4: 53879              2012

 

                                        (65954) OFFICE/OUTPATIENT VISIT EST

Diagnosis: DIZZINESS/VERTIGO[ICD9: 780.4]

Diagnosis: MALAISE AND FATIGUE[ICD9: 780.79]

Diagnosis: HYPERTENSION[ICD9: 401.9] Akanksha MEJIA [facility]                       CPT-4: 25639              2012

 

                                        OFFICE/OUTPATIENT VISIT EST

Diagnosis: LUMB/LUMBOSAC DISC DEGEN[ICD9: 722.52]

Diagnosis: Radiculopathy of leg[ICD9: 724.4]

Diagnosis: SACROILIITIS NEC[ICD9: 720.2]

Diagnosis: SPINAL ENTHESOPATHY[ICD9: 720.1] Akanksha DRIVER [facility]            CPT-4: 01527              2012

 

                                        (61709) OFFICE/OUTPATIENT VISIT EST

Diagnosis: PAIN, LOWER BACK[ICD9: 724.2]

Diagnosis: SPASM OF MUSCLE[ICD9: 728.85]

Diagnosis: SCIATICA[ICD9: 724.3]

Diagnosis: Lumbar degenerative disc disease[ICD9: 722.52] Akanksha DRIVER [facility] CPT-4: 65948              2012

 

                                        (54650) OFFICE/OUTPATIENT VISIT EST

Diagnosis: PAIN IN THORACIC SPINE[ICD9: 724.1]

Diagnosis: SPASM OF MUSCLE[ICD9: 728.85] Akanksha DRIVER [facility]            CPT-4: 20154              2012

 

                                        (75869) OFFICE/OUTPATIENT VISIT EST

Diagnosis: PAIN, LOWER BACK[ICD9: 724.2]

Diagnosis: SPASM OF MUSCLE[ICD9: 728.85]

Diagnosis: Sacroiliac dysfunction[ICD9: 739.4]

Diagnosis: Lumbar degenerative disc disease[ICD9: 722.52] Akanksha DRIVER [facility] CPT-4: 29694              2012

 

                                        OFFICE/OUTPATIENT VISIT EST

Diagnosis: MALAISE AND FATIGUE[ICD9: 780.79]

Diagnosis: HYPOTENSION[ICD9: 458.9]

Diagnosis: CAD[ICD9: 414.00] Akanksha DRIVER [facility] 

CPT-4: 48716                            2012

 

                                        OFFICE/OUTPATIENT VISIT EST

Diagnosis: SINUSITIS, ACUTE[ICD9: 461.9]

Diagnosis: PHARYNGITIS, ACUTE[ICD9: 462]

Diagnosis: CERUMEN IMPACTION[ICD9: 380.4] [facility]anksha [facility]            CPT-4: 47515              2011

 

                                        OFFICE/OUTPATIENT VISIT EST

Diagnosis: PAIN IN THORACIC SPINE[ICD9: 724.1]

Diagnosis: GERD[ICD9: 530.81]

Diagnosis: DIZZINESS/VERTIGO[ICD9: 780.4] Akanksha BAUMAN S

. 

[facility]            CPT-4: 37115              2011

 

                OFFICE/OUTPATIENT VISIT EST Akanksha OTOOLE

[facility] CPT-

4: 09949                                2011

 

                    (05327) OFFICE/OUTPATIENT VISIT EST Akanksha CASTILLO SAramis [facility]                       CPT-4: 40954              2011

 

                                        ([id number]) OFFICE/OUTPATIENT VISIT, EST

Diagnosis: PAIN, LOWER BACK[ICD9: 724.2] Akanksha BAUMAN SAramis

 

[facility]            CPT-4: 27500              2011

 

                    (17491) OFFICE/OUTPATIENT VISIT, EST Akanksha DE LA ROSA S. [facility]

[facility]                       CPT-4: 51110              2011

 

                    (82707) OFFICE/OUTPATIENT VISIT, EST Akanksha DE LA ROSA S. [facility]

[facility]                       CPT-4: 29216              2010

 

                    (03162) OFFICE/OUTPATIENT VISIT, EST Akanksha DE LA ROSA S. [facility]

[facility]                       CPT-4: 56726              2010

 

                    (51291) OFFICE/OUTPATIENT VISIT, EST Akanksha DE LA ROSA S. [facility]

[facility]                       CPT-4: 71097              2010

 

                    (12411) OFFICE/OUTPATIENT VISIT, EST Akanksha DE LA ROSA S. [facility]

[facility]                       CPT-4: 16382              2010

 

                    (79284) OFFICE/OUTPATIENT VISIT, EST Akanksha DE LA ROSA S. [facility]

[facility]                       CPT-4: 14572              2010

 

                    (32209) OFFICE/OUTPATIENT VISIT, EST Akanksha DE LA ROSA S. [facility]

[facility]                       CPT-4: 80562              2010







Plan of Care





             Planned Activity Notes        Codes        Status       Date

 

                          Visit Diagnosis Plan: Palpitations Discussion: Check C

BC, CMP, TSH

                                        ICD-9 : 785.1

ICD-10 : R00.2

                                                    2020

 

                          Visit Diagnosis Plan: Essential hypertension Discussio

n: Increase metoprolol to 

25mg q AM and 50mg po q pM Recheck 1 week

                                        ICD-9 : 401.9

ICD-10 : I10

                                                    2020

 

                          Patient Education: metoprolol tartrate- [employer] [facility] [id number] 

https://www.[email or web address]/samplemd/resources/getResource/61/[email or web address]

                                        Completed           2020

 

                    Care Plan: X-RAY EXAM NECK SPINE 4/5VWS                     

LOINC : 46486-5

                          Pending                   2020

 

                    Care Plan: X-RAY EXAM THORAC SPINE4/>VW                     

LOINC : 81778-7

                          Pending                   2020

 

                                        Appointment: Akanksha Driver

WPtel:[phone]

                                        [address] [place] [address]                                              LAB             2019

 

                                        Appointment: Akanksha Driver

WPtel:[phone]

                                        [address] [place] [address]                                              INJECTION       10/22/2019

 

             Patient Education: INFLUENZA VACCINE [facility]                           

Completed    10/22/2019

 

                                        Appointment: Akanksha Driver

WPtel:[phone]

                                        [address] [place] [address]                                              LAB             10/11/2019

 

                          Visit Diagnosis Plan: Acute serous otitis media, left 

ear Discussion: instructed

to use flonase and claritin daily for symptom relief. discussed with patient 
that if no improvement in 2 weeks, will need to follow up with ENT for audiology
exam. instructed to call office with any other symptoms such as otalgia, 
dysphagia, fever, etc.

                                        ICD-9 : 381.01

ICD-10 : H65.02

                                                    2019

 

                                        Appointment: Nat Lozoya

                                        [address] [place] [address]                                              ACUTE ILLNESS   2019

 

                          Visit Diagnosis Plan: Urinary tract infection, site no

t specified Discussion: 

Started an old abx prescription

                                        ICD-9 : 599.0

ICD-10 : N39.0

                                                    06/10/2019

 

                          Visit Diagnosis Plan: Hypoglycemia, unspecified Discus

madeleine: Monitor BS At least 

6 small meals a day each with protein

                                        ICD-9 : 251.2

ICD-10 : E16.2

                                                    06/10/2019

 

                          Visit Diagnosis Plan: Essential (primary) hypertension

 Discussion: Stable Check 

CMP

                                        ICD-9 : 401.9

ICD-10 : I10

                                                    06/10/2019

 

                          Visit Diagnosis Plan: Other fatigue Discussion: Check 

CBC, TSH

                                        ICD-9 : 780.79

ICD-10 : R53.83

                                                    06/10/2019

 

                                        Appointment: Akanksha Driver

WPtel:[phone]

                                        [address] [place], [place] [id number]

[place]                                              FOLLOW UP       06/10/2019

 

                          Visit Diagnosis Plan: Essential (primary) hypertension

 Discussion: Stable Sees 

Dr. Clements this month

                                        ICD-9 : 401.9

ICD-10 : I10

                                                    2019

 

                          Visit Diagnosis Plan: Urinary tract infection, site no

t specified Discussion: 

Macrobid 100mg po BID for 1 week

                                        ICD-9 : 599.0

ICD-10 : N39.0

                                                    2019

 

                          Visit Diagnosis Plan: Gastro-esophageal reflux disease

 without esophagitis 

Discussion: Stable on omeprazole

Follow Up: 3 months

                                        ICD-9 : 530.81

ICD-10 : K21.9

                                                    2019

 

                                        Appointment: Akanksha Driver

WPtel:[phone]

                                        [address] [place], [place] [id number]

[place]                                              FOLLOW UP       2019

 

                          Patient Education: metoprolol tartrate- [employer] [facility] [id number] 

https://www.[employer]/samplemd/resources/getResource/61/[email or web address]

                                        Completed           2019

 

                                        Appointment: Akanksha Driver

WPtel:[phone]

                                        [address] [place], [place] [id number]

[place]                                              CANCELED        02/15/2019

 

                          Visit Diagnosis Plan: Gastro-esophageal reflux disease

 without esophagitis 

Discussion: Continue protonix and carafate Proceed with updated EGD

                                        ICD-9 : 530.81

ICD-10 : K21.9

                                                    2018

 

                          Visit Diagnosis Plan: Epigastric pain Discussion: [facility]

k CT abdomen/pelvis due to

ongoing abdominal pain

                                        ICD-9 : 789.06

ICD-10 : R10.13

                                                    2018

 

                                        Appointment: Akanksha Driver

WPtel:[phone]

                                        [address] [place], [place] [id number]

[place]                                              FOLLOW UP       2018

 

                    Care Plan: CT PELVIS W/O DYE                     LOINC : 361

08-9

                          Pending                   2018

 

                    Care Plan: CT ABDOMEN W/O DYE                     LOINC : 36

103-0

                          Pending                   2018

 

                    Care Plan: Referral Order                     SNOMED-CT : 30

[id number]

                          Pending                   2018

 

                                        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

art disease of native coronary artery 

without angina pectoris                 Discussion: S/P cardiac cath--doing medi

vicki management 

with imdur and metoprolol increased Has fwup with cardiology next week Discussed
cardiac rehab

                                        ICD-9 : 414.00

ICD-10 : I25.10

                                                    2018

 

                          Visit Diagnosis Plan: Generalized anxiety disorder Dis

cussion: Stable

                                        ICD-9 : 300.00

ICD-10 : F41.1

                                                    2018

 

                          Visit Diagnosis Plan: Gastro-esophageal reflux disease

 without esophagitis 

Discussion: Continue protonix at BID dosing and carafate BID

Follow Up: 2 months

                                        ICD-9 : 530.81

ICD-10 : K21.9

                                                    2018

 

                          Visit Diagnosis Plan: Essential (primary) hypertension

 Discussion: Stable

                                        ICD-9 : 401.9

ICD-10 : I10

                                                    2018

 

                                        Appointment: Akanksha Driver

WPtel:[phone] [address] [id number]

                                              FOLLOW UP       2018

 

                          Visit Diagnosis Plan: Gastro-esophageal reflux disease

 without esophagitis 

Discussion: Continue protonix at BID dosing Continue carafate at q AC and HS 
dosing for 2 more weeks then decrease to BID dosing

Follow Up: 4 weeks

                                        ICD-9 : 530.81

ICD-10 : K21.9

                                                    10/02/2018

 

                          Visit Diagnosis Plan: Urinary tract infection, site no

t specified Discussion: 

Reculture urine

                                        ICD-9 : 599.0

ICD-10 : N39.0

                                                    10/02/2018

 

                          Visit Diagnosis Plan: Generalized anxiety disorder Dis

cussion: Increase xanax to

BID dosing especially with upcoming cardiac testing which is already making 
patient more anxious and worried

                                        ICD-9 : 300.00

ICD-10 : F41.1

                                                    10/02/2018

 

                          Visit Diagnosis Plan: Palpitations Discussion: Cardilo

logy going to schedule 

Lexiscan

                                        ICD-9 : 785.1

ICD-10 : R00.2

                                                    10/02/2018

 

                                        Appointment: Akanksha Driver

WPtel:[phone]

                                        [address] [place], [place] [id number]                                              FOLLOW UP       10/02/2018

 

             Patient Education: Patient Medication Summary                      

     Completed    10/02/2018

 

                                        Appointment: Akanksha Driver

WPtel:[phone]

                                        [address] [place], [place] [id number]                                              LAB             2018

 

             Patient Education: Patient Medication Summary                      

     Completed    2018

 

                          Visit Diagnosis Plan: Epigastric pain Discussion: Cont

inue protonix and carafate

but make into a slurry Flu shot given Discussed EGD if symptoms persist

Follow Up: 2 weeks

                                        ICD-9 : 789.06

ICD-10 : R10.13

                                                    2018

 

                          Visit Diagnosis Plan: Generalized anxiety disorder Dis

cussion: Did discuss 

increased risk of benzodiazepines causing dementia but at this time will stay at
xanax 0.25mg po BID

                                        ICD-9 : 300.00

ICD-10 : F41.1

                                                    2018

 

                                        Appointment: Akanksha Driver

WPtel:[phone]

                                        [address] [place] [address]                                              FOLLOW UP       2018

 

             Patient Education: Patient Medication Summary                      

     Completed    2018

 

                          Visit Diagnosis Plan: Essential (primary) hypertension

 Discussion: Has 

hydralazine to use prn per cardiology Going to do 30 day holter monitor

                                        ICD-9 : 401.9

ICD-10 : I10

                                                    2018

 

                          Visit Diagnosis Plan: Hypoglycemia, unspecified Discus

madeleine: 6 small meals a 

day--each with protein Increase fluid intake

                                        ICD-9 : 251.2

ICD-10 : E16.2

                                                    2018

 

                          Visit Diagnosis Plan: Gastro-esophageal reflux disease

 without esophagitis 

Discussion: Change to protonix 40mg po BID

Follow Up: 1 months

                                        ICD-9 : 530.81

ICD-10 : K21.9

                                                    2018

 

                                        Appointment: Akanksha Driver

WPtel:[phone]

                                        [address] [place] [address]                                              ACUTE ILLNESS   2018

 

             Patient Education: Patient Medication Summary                      

     Completed    2018

 

                          Visit Diagnosis Plan: Dizziness and giddiness Discussi

on: blood work to be 

completed in office including cmp, cbc, troponin to rule out glucose 
intolerance, infection, dehydration. instructed patient that she needs to see 
her cardiologist sooner than oct and patient requested we contact dr. clements's 
office. will have front office make appt. patient has carotid doppler annually.

                                        ICD-9 : 780.4

ICD-10 : R42

                                                    2018

 

                                        Appointment: Nat Lozoya

                                        [address]                                              ACUTE ILLNESS   2018

 

             Patient Education: Patient Medication Summary                      

     Completed    2018

 

                          Visit Diagnosis Plan: Cervicalgia Discussion: Complete

 course of PT since 

symptoms improved Continue stretching exercises Notify if symptoms return or 
worsen

                                        ICD-9 : 723.1

ICD-10 : M54.2

                                                    2018

 

                                        Appointment: Akanksha Driver

WPtel:[phone]

                                        [address] [place] [address]                                              FOLLOW UP       2018

 

             Patient Education: Patient Medication Summary                      

     Completed    2018

 

                          Visit Diagnosis Plan: Hypoglycemia, unspecified Discus

madeleine: Eat something with 

protein every 2 hrs

                                        ICD-9 : 251.2

ICD-10 : E16.2

                                                    2018

 

                          Visit Diagnosis Plan: Other spondylosis with radiculop

athy, cervical region 

Discussion: Check MRI of cervical spine

                                        ICD-9 : 721.0

ICD-10 : M47.22

                                                    2018

 

                          Visit Diagnosis Plan: Vertigo of central origin, bilat

eral Discussion: Discussed

PT for vestibular therapy

                                        ICD-9 : 386.2

ICD-10 : H81.43

                                                    2018

 

                                        Appointment: Akanksha Driver

WPtel:[phone]

                                        [facility]

                                                              2018

 

             Patient Education: Patient Medication Summary                      

     Completed    2018

 

                    Care Plan: MRI NECK SPINE W/O DYE                     LOINC 

: [id number]

                          Pending                   2018

 

                          Visit Diagnosis Plan: Otalgia, bilateral Discussion: k

enalog 40 mg given to 

patient im. instructed patient to monitor blood sugar at home due to risk of 
increased sugar. instructed patient to rtc on wednesday if no improvement and 
may require antibiotic.

                                        ICD-9 : 388.70

ICD-10 : H92.03

                                                    2018

 

                          Visit Diagnosis Plan: Benign paroxysmal vertigo, bilat

eral Discussion: patient 

has meclizine at home but states it makes her too tired. instructed patient to 
cut in half and take at night. if it doesn't cause too much drowsiness, 
instructed to take bid until feeling better. instructed to rtc wed if no 
improvement or worsening symptoms. instructed patient to increase fluid intake 
as well.

                                        ICD-9 : 386.11

ICD-10 : H81.13

                                                    2018

 

                                        Appointment: Nat Lozoya

                                        [address] [place] [address]                                              ACUTE ILLNESS   2018

 

             Patient Education: Patient Medication Summary                      

     Completed    2018

 

                          Visit Diagnosis Plan: Left lower quadrant pain Discuss

ion: Culture urine Notify 

if worsens

                                        ICD-9 : 789.04

ICD-10 : R10.32

                                                    2018

 

                          Visit Diagnosis Plan: Low back pain Discussion: Stretc

hes Topical aspercreme 

Tylenol 1 po TID

                                        ICD-9 : 724.2

ICD-10 : M54.5

                                                    2018

 

                          Visit Diagnosis Plan: Radiculopathy, lumbosacral regio

n Discussion: As above

                                        ICD-9 : 724.4

ICD-10 : M54.17

                                                    2018

 

                                        Appointment: Akanksha Driver

WPtel:[phone]

                                        [address] [place] [address]                                              ACUTE ILLNESS   2018

 

             Patient Education: Patient Medication Summary                      

     Completed    2018

 

                                        Appointment: Akanksha Driver

WPtel:[phone]

                                        [address] [place] [address]

[place]                                              INJECTION       10/06/2017

 

             Patient Education: Patient Medication Summary                      

     Completed    10/06/2017

 

                                        Appointment: Akanksha Driver

WPtel:[phone]

                                        [address] [place] [address]

[place]                                              LAB             2017

 

             Patient Education: Patient Medication Summary                      

     Completed    2017

 

                          Visit Diagnosis Plan: Pain in thoracic spine Discussio

n: PT has helped Continue 

stretches and walking [facility]

                                        ICD-9 : 724.1

ICD-10 : M54.6

                                                    2017

 

                          Visit Diagnosis Plan: Other intervertebral disc degene

ration, lumbar region 

Follow Up: 3 months

                                        ICD-9 : 722.52

ICD-10 : M51.36

                                                    2017

 

                                        Appointment: Akanksha Driver

WPtel:[phone]

                                        [address] [place], [place] [id number]

[place]                                              FOLLOW UP       2017

 

             Patient Education: Patient Medication Summary                      

     Completed    2017

 

                          Visit Diagnosis Plan: Low back pain Discussion: Start 

PT for low back- Seems 

like abdominal pain is radiating from low back

Follow Up: 5 weeks

                                        ICD-9 : 724.2

ICD-10 : M54.5

                                                    2017

 

                          Visit Diagnosis Plan: Left lower quadrant pain Discuss

ion: Had CT scan of 

abdomen/pelvis in 2017 and normal colonoscopy in 2015

                                        ICD-9 : 789.04

ICD-10 : R10.32

                                                    2017

 

                                        Appointment: Akanksha Driver

WPtel:[phone]

                                        [address] [place], [place] [id number]                                              ACUTE ILLNESS   2017

 

             Patient Education: Patient Medication Summary                      

     Completed    2017

 

                                        Appointment: Akanksha Driver

WPtel:[phone]

                                        [address] [place], [place] [id number]

[place]                                              LAB             2017

 

             Patient Education: Patient Medication Summary                      

     Completed    2017

 

                          Visit Diagnosis Plan: Mixed hyperlipidemia Discussion:

 Check lipids

                                        ICD-9 : 272.4

ICD-10 : E78.2

                                                    2017

 

                          Visit Diagnosis Plan: Impaired fasting glucose Discuss

ion: Return in AM for CMP,

HbA1C Accuchecks daily Diet/Exercise discussed at length

                                        ICD-9 : 790.29

ICD-10 : R73.01

                                                    2017

 

                          Visit Diagnosis Plan: Other fatigue Discussion: Check 

CBC, TSH

                                        ICD-9 : 780.79

ICD-10 : R53.83

                                                    2017

 

                          Visit Diagnosis Plan: Mastodynia Discussion: Check Jace

ateral diagnostic 

mammogram with US

                                        ICD-9 : 611.71

ICD-10 : N64.4

                                                    2017

 

                                        Appointment: Akanksha Driver

WPtel:[phone]

                                        [address] [place], [place] [id number]

[place]              3/ confirmed `sl                 ACUTE ILLNESS   2017

 

             Patient Education: Patient Medication Summary                      

     Completed    2017

 

                    Care Plan: MAMMOGRAM SCREENING                     LOINC : 2

1948-5

                          Pending                   2017

 

                          Visit Diagnosis Plan: Acute upper respiratory infectio

n, unspecified Discussion:

Exam is reassuring Likely viral illness Supportive care reviewed and encouraged 
Follow up PRN

                                        ICD-9 : 465.9

ICD-10 : J06.9

                                                    2017

 

                                        Appointment: Luba Stevenson 

                                        [address] [place] [address]                                              ACUTE ILLNESS   2017

 

             Patient Education: Patient Medication Summary                      

     Completed    2017

 

                          Visit Plan:               Supportive care. Rest, Fluid

s, Tylenol/Motrin prn fever or 

bodyaches. Notify if worsening symptoms.

                                                            2016

 

                                        Appointment: Akanksha Driver

WPtel:[phone]

                                        [address] [place] [address]                                              ACUTE ILLNESS   2016

 

             Patient Education: Patient Medication Summary                      

     Completed    2016

 

                                        Appointment: Akanksha Driver

WPtel:[phone]

                                        [address] [place] [address]                                              INJECTION       10/07/2016

 

             Patient Education: Patient Medication Summary                      

     Completed    10/07/2016

 

                                        Appointment: Akanksha Driver

WPtel:[phone]

                                        [address] [place] [address]                                              LAB             2016

 

             Patient Education: Patient Medication Summary                      

     Completed    2016

 

                          Visit Plan:               Add miralax daily Use daily 

probiotic and metamucil and push fluids

Notify if worsens/persists

                                                            2016

 

                                        Appointment: Akanksha Driver

WPtel:[phone]

                                        [address] [place] [address]              16 confirmed ~sl                 ACUTE ILLNESS   2016

 

             Patient Education: Patient Medication Summary                      

     Completed    2016

 

                          Visit Plan:               No NSAIDs Kidney function di

scussed Discussed hydration Monitor lab

every 4months so will check CMP, HbA1C next month

                                                            2016

 

                                        Appointment: Akanksha Driver

WPtel:[phone]

                                        [address] [place] [address]              03/15 confirmed ~sl                 ACUTE ILLNESS   2016

 

             Patient Education: Patient Medication Summary                      

     Completed    2016

 

                          Visit Plan:               Vestibular exercises Refill 

flonase Strict low Na diet--discussed 

that needs to read labels Rx for walker with chair given due to muscle 
weakness/unsteadiness Has carotids and abdominal aorta and legs checked in 
January Check Chem 7

                                                            2015

 

                                        Appointment: Akanksha Driver

WPtel:[phone]

                                        [address] [place] [address]              12/15/15 appt confirmed cn                 FOLLOW UP       

 

             Patient Education: Patient Medication Summary                      

     Completed    2015

 

             Patient Education: Vertigo                           Completed    1

2015

 

                                        Appointment: Akanksha Driver

WPtel:[phone]

                                        [address] [place] [address]

[place]                                              INJECTION       10/16/2015

 

             Patient Education: Patient Medication Summary                      

     Completed    10/16/2015

 

                                        Appointment: Akanksha Driver

WPtel:[phone]

                                        [address] [place] [address]                                              UA              09/15/2015

 

             Patient Education: Patient Medication Summary                      

     Completed    09/15/2015

 

                                        Referral: Landon De La Torre

WPtel:[phone]

#1 [facility] [address]              Referral                        Completed       2015

 

                                        Appointment: Akanksha Driver

WPtel:[phone]

                                        [address] [place] [address]                                              LAB             09/10/2015

 

             Patient Education: Patient Medication Summary                      

     Completed    09/10/2015

 

                          Visit Plan:               Check CMP, CBC, TSH, Free T4

, B12, Lipids, HbA1C Discussed 6 small 

meals a day each with protein Would likely benefit from antidepressant Update 
colonoscopy

                                                            2015

 

                                        Appointment: Akanksha Driver

WPtel:[phone]

                                        [address] [place], [place] [id number]              9/8/15 confirmed                 ACUTE ILLNESS   2015

 

             Patient Education: Patient Medication Summary                      

     Completed    2015

 

                          Visit Plan:               Cerumen flush with warm wate

r and peroxide - good results Resume 

Nasonex nasal spray daily Recommended Debrox earwax removal drops

                                                            2015

 

                                        Appointment: Bertha Mon

WPtel:[phone]

                                        [address] [place], [place] [id number]                                              ACUTE ILLNESS   2015

 

             Patient Education: Patient Medication Summary                      

     Completed    2015

 

                          Visit Plan:               Continue aspirin daily Add m

eloxicam for 1week Elevate and Ice Call

on 2015

 

                                        Appointment: Akanksha Driver

WPtel:[phone]

                                        [address] [place] [address]                                              ACUTE ILLNESS   2015

 

             Patient Education: Patient Medication Summary                      

     Completed    2015

 

                          Visit Plan:               Been using baclofen at [facility] and has helped some PT for next 

2-4weeks

                                                            2015

 

                                        Appointment: Akanksha Driver

WPtel:[phone]

                                        [address] [place] [address] Follow Up 2015

 

             Patient Education: Patient Medication Summary                      

     Completed    2015

 

                                        Appointment: Akanksha Driver

WPtel:[phone]

                                        [address] [place] [address]                                              LAB             2015

 

                                        Appointment: Akanksha Driver

WPtel:[phone]

                                        [address] [place] [address]              feeling better, weather -                 FOLLOW UP       

 

                                        Referral: Landon De La Torre

WPtel:[phone]

1 [facility]              Referral                        Appointment Requested 2015

 

                          Visit Plan:               Continue exercise and curren

t meds See surgery for removal of right

arm lesion

                                                            2015

 

                                        Appointment: Akanksha Driver

WPtel:[phone]

                                        [address] [place], [place] [address]                                              FOLLOW UP       2015

 

             Patient Education: Patient Medication Summary                      

     Completed    2015

 

                                        Appointment: Akanksha Driver

WPtel:[phone]

                                        [address] [place], [place] [id number]

[place]                                              INJECTION       10/17/2014

 

             Patient Education: Patient Medication Summary                      

     Completed    10/17/2014

 

                                        Appointment: Bertha Mon

WPtel:[phone]

                                        [address] [place], [place] [id number]                                              ACUTE ILLNESS   2014

 

             Patient Education: Patient Medication Summary                      

     Completed    2014

 

                          Visit Plan:               Discussed that likely lumbar

 etiology for leg weakness Will change 

amlodopine to low dose dyazide and see if helps legs and inner ear

                                                            2014

 

                                        Appointment: Akanksha Driver

WPtel:[phone]

                                        [address] [place] [address]                                              FOLLOW UP       2014

 

             Patient Education: Patient Medication Summary                      

     Completed    2014

 

                          Visit Plan:               Ceftin and continue claritin

/flonase Decrease amlodopine to 2.5mg q

HS until fwup with Card due to weakness

                                                            2014

 

                                        Appointment: Akanksha Driver

WPtel:[phone]

                                        [address] [place] [address]                                              ACUTE ILLNESS   2014

 

             Patient Education: Patient Medication Summary                      

     Completed    2014

 

                                        Appointment: Akanksha Driver

WPtel:[phone]

                                        [address] [place] [address]                                              LAB             2014

 

             Patient Education: Patient Medication Summary                      

     Completed    2014

 

                                        Appointment: Bertha Mon

WPtel:[phone]

                                        [address] [place] [address]                                              ACUTE ILLNESS   2014

 

             Patient Education: Patient Medication Summary                      

     Completed    2014

 

                          Visit Plan:               Supportive care. Rest, Fluid

s, Tylenol prn fever or bodyaches. 

Notify if worsening symptoms. Ceftin

                                                            10/15/2013

 

                                        Appointment: Bertha Mon

WPtel:[phone]

                                        [address] [place] [address]                                              ACUTE ILLNESS   10/15/2013

 

             Patient Education: Patient Medication Summary                      

     Completed    10/15/2013

 

                                        Appointment: Akanksha Driver

WPtel:[phone]

                                        [address] [place] [address]                                              BP CHECK        2013

 

             Patient Education: Patient Medication Summary                      

     Completed    2013

 

                                        Appointment: Akanksha Driver

WPtel:[phone]

                                        [address] [place], [place] [id number]

                                              ER Follow UP    2013

 

             Patient Education: Patient Medication Summary                      

     Completed    2013

 

                          Visit Plan:               Restart flonase BID Use mecl

izine 25mg q HS Vestibular exercises 

Claritin 10mg q AM See ENT if doesn't resolve

                                                            2013

 

                                        Appointment: Akanksha Driver

WPtel:[phone]

                                        [address] [place] [address]                                              ACUTE ILLNESS   2013

 

             Patient Education: Patient Medication Summary                      

     Completed    2013

 

                          Visit Plan:               Will continue to observe

                                                            2013

 

                                        Appointment: Akanksha Driver

WPtel:[phone]

                                        [address] [place] [address]                                              FOLLOW UP       2013

 

             Patient Education: Patient Medication Summary                      

     Completed    2013

 

                          Visit Plan:               ERx for Augmentin 875mg q12 

x 10 days and Floxin otic drops 5 gtts 

AD x7days Sample of Nasonex per pt request Discussed treatment (nasal saline, 
salt water gargles, keeping right ear clean and dry, for worsening go to UC on 
weekend, Tylenol/ibuprofen, etc.) RTC 2 weeks for ear recheck.

                                                            05/10/2013

 

                                        Appointment: Jill Jean 

WPtel:[phone]

                                        [address] [place] [address]                                              ACUTE ILLNESS   05/10/2013

 

             Patient Education: Patient Medication Summary                      

     Completed    05/10/2013

 

                          Visit Plan:               Decrease caffeine intake Marina

ck Bilateral Mammogram with US of left 

breast

                                                            2013

 

                                        Appointment: Akanksha Driver

WPtel:[phone]

                                        [address] [place], [place] [id number]                                              ACUTE ILLNESS   2013

 

             Patient Education: Patient Medication Summary                      

     Completed    2013

 

                                        Appointment: Kate Hall

WPtel:[phone]

                                        [address] [place], [place] [id number]

              appt scheduled                  ACUTE ILLNESS   2013

 

                                        Appointment: Akanksha Driver

WPtel:[phone]

                                        [address] [place], [place] [id number]

US                                              LAB             2012

 

             Patient Education: Patient Medication Summary                      

     Completed    2012

 

                          Visit Plan:               Continue off carafate and se

e how does Continue probiotic Add 

Vestibular exercises and use meclizine prn Continue Nasonex Check fasting lab 
including CMP, Lipids, CBC, TSH, Free T4, HbA1C

                                                            2012

 

                                        Appointment: Akanksha Drivertel:[phone]

                                        [address] [place], [place] [id number]

                                              FOLLOW UP       2012

 

             Patient Education: Patient Medication Summary                      

     Completed    2012

 

                          Visit Plan:               Continue carafate for 4more 

weeks at current dose then decrease to 

BID for 2wks then q HS

                                                            10/23/2012

 

                                        Appointment: Akanksha Driver

WPtel:[phone]

                                        [address] [place], [place] [id number]                                              FOLLOW UP       10/23/2012

 

             Patient Education: Patient Medication Summary                      

     Completed    10/23/2012

 

                          Visit Plan:               Continue omeprazole Add Ellyn

fate 1gm po q AC Add daily probiotic

                                                            10/10/2012

 

                                        Appointment: Akanksha Driver

WPtel:[phone]

                                        [address] [place] [address]                                              ACUTE ILLNESS   10/10/2012

 

             Patient Education: Patient Medication Summary                      

     Completed    10/10/2012

 

                                        Appointment: Akanksha Drivertel:[phone]

                                        [address] [place], [place] [id number]                                              INJECTION       2012

 

             Patient Education: Patient Medication Summary                      

     Completed    2012

 

                          Visit Plan:               Diabetic Diet Accuchecks BID

 alternating times Hold onglyza and 

Januvia for now and continue diet and exercise and weight loss and moniter BS 
Discussed hypoglycemia and snack of peanut butter and crackers with juice or 
milk

                                                            2012

 

                                        Appointment: Akanksha Drivertel:[phone]

                                        [address] [place], [place] [id number]

                                              WORK IN         2012

 

             Patient Education: Patient Medication Summary                      

     Completed    2012

 

                                        Appointment: Akanksha Driver

WPtel:[phone]

                                        [address] [place] [address]                                              UA              2012

 

             Patient Education: Patient Medication Summary                      

     Completed    2012

 

                                        Appointment: Akanksha Drivertel:[phone]

                                        [address] [place] [address]                                              LAB             2012

 

             Patient Education: Patient Medication Summary                      

     Completed    2012

 

                                        Appointment: Akanksha Driver

WPtel:[phone]

                                        [address] [place] [address]                                              ACUTE ILLNESS   2012

 

             Patient Education: Patient Medication Summary                      

     Completed    2012

 

                          Visit Plan:               Injection to Right SI joint 

as above Pt will call in 3 days on pain

Has PT starting in 2wks.

                                                            2012

 

                                        Appointment: Akanksha Driver

WPtel:[phone]

                                        [address] [place] [address]                                              ACUTE ILLNESS   2012

 

             Patient Education: Patient Medication Summary                      

     Completed    2012

 

                          Visit Plan:               OMT done Start PT May need u

pdated MRI if need to consider epidural

Prednisone

                                                            2012

 

                                        Appointment: Akanksha Driver

WPtel:[phone]

                                        [address] [place] [address]                                              OMT             2012

 

             Patient Education: Patient Medication Summary                      

     Completed    2012

 

                          Visit Plan:               Flexeril and Vimovo OMT done

 Daily stretches

                                                            2012

 

                                        Appointment: Akanksha Driver

WPtel:[phone]

                                        [address] [place] [address]                                              ACUTE ILLNESS   2012

 

             Patient Education: Patient Medication Summary                      

     Completed    2012

 

                          Visit Plan:               OMT done Daily stretches Vinod

st heat or biofreeze Right SI joint 

injection Call in 1week Vimovo BID

                                                            2012

 

                                        Appointment: Akanksha Driver

WPtel:[phone]

                                        [address] [place] [address]                                              ACUTE ILLNESS   2012

 

             Patient Education: Patient Medication Summary                      

     Completed    2012

 

                                        Appointment: Akanksha Driver

WPtel:[phone]

                                        [address] [place], [place] [id number]

[place]                                              LAB             2012

 

             Patient Education: Patient Medication Summary                      

     Completed    2012

 

                          Visit Plan:               Decrease amlodopine to 1.25m

g daily Schedule with Cardiology 

Fasting lab in AM

                                                            2012

 

                                        Appointment: Akanksha Drivertel:[phone]

                                        [address] [place], [place] [id number]

                                              ACUTE ILLNESS   2012

 

             Patient Education: Patient Medication Summary                      

     Completed    2012

 

                          Visit Plan:               Saline nasal flushes prn. Ty

lenol/Motrin prn headache. Notify if 

persists/symptoms worsening. Cerumen removal from ears as above

                                                            2011

 

                                        Appointment: Akanksha Driver

WPtel:[phone]

                                        [address] [place], [place] [id number]                                              ACUTE ILLNESS   2011

 

             Patient Education: Patient Medication Summary                      

     Completed    2011

 

                                        Appointment: Akanksha Driver

WPtel:[phone]

                                        [address] [place], [place] [id number]

[place]                                              INJECTION       10/05/2011

 

             Patient Education: Patient Medication Summary                      

     Completed    10/05/2011

 

                          Visit Plan:               Continue vimovo for 1more we

ek Increase Omeprazole to BID for 1mo 

then resume QD if stomach improved Vestibular exercises with meclizine q HS for 
next week

                                                            2011

 

                                        Appointment: Akanksha Driver

WPtel:[phone]

                                        [address] [place], [place] [id number]

                                              FOLLOW UP       2011

 

             Patient Education: Patient Medication Summary                      

     Completed    2011

 

                                        Appointment: Akanksha Driver

WPtel:[phone]

                                        [address] [place], [place] [id number]

                                              ACUTE ILLNESS   2011

 

             Patient Education: Patient Medication Summary                      

     Completed    2011

 

                                        Appointment: Akanksha Driver

WPtel:[phone]

                                        [address] [place], [place] [id number]

[place]                                              LAB             2011

 

             Patient Education: Patient Medication Summary                      

     Completed    2011

 

                          Visit Plan:               Saline nasal flushes prn. Ty

lenol/Motrin prn headache. Notify if 

persists/symptoms worsening. Add Veramyst Increase Omeprazole to 20mg po BID

                                                            2011

 

                                        Appointment: Akanksha Driver

WPtel:[phone]

                                        [address] [place] [address]                                              ACUTE ILLNESS   2011

 

             Patient Education: Patient Medication Summary                      

     Completed    2011

 

                                        Appointment: Akanksha Driver

WPtel:[phone]

                                        [address] [place] [address]                                              FOLLOW UP       2011

 

             Patient Education: Patient Medication Summary                      

     Completed    2011

 

                                        Appointment: Akanksha Driver

WPtel:[phone]

                                        [address] [place] [address]                                              ACUTE ILLNESS   2011

 

             Patient Education: Patient Medication Summary                      

     Completed    2011

 

                          Visit Plan:               Nasocourt sample given. Pt. 

will notify if symptoms are worse on 

Monday.

                                                            2010

 

                                        Appointment: Kate Hall

WPtel:[phone]

                                        [address] [place] [address]                                              ACUTE ILLNESS   2010

 

             Patient Education: Patient Medication Summary                      

     Completed    2010

 

                                        Appointment: Akanksha Driver

WPtel:[phone]

                                        [address] [place] [address]

[place]                                              INJECTION       10/06/2010

 

             Patient Education: Patient Medication Summary                      

     Completed    10/06/2010

 

                                        Appointment: Akanksha Driver

WPtel:[phone]

                                        [address] [place] [address]                                              FOLLOW UP       2010

 

             Patient Education: Patient Medication Summary                      

     Completed    2010

 

             Patient Education: Lexapro                           Completed    0

2010

 

                          Visit Plan:               May proceed with hiatal mykel

ia repair per Card. so will contact Dr. Cash's office to proceed with surgery Trial of Lexapro 5mg QD plus use 
xanax prn

                                                            2010

 

                                        Appointment: Akanksha Driver

WPtel:[phone]

                                        [address] [place], [place] [address]

                                              FOLLOW UP       2010

 

             Patient Education: Patient Medication Summary                      

     Completed    2010

 

                          Visit Plan:               B12 given Cont oral B12 and 

iron Fwup 1mo for B12 Proceed with 

hiatal hernia repair once card clearance

                                                            2010

 

                                        Appointment: Akanksha Driver

WPtel:[phone]

                                        [address] [place], [place] [id number]

                                              FOLLOW UP       2010

 

             Patient Education: Patient Medication Summary                      

     Completed    2010

 

                                        Appointment: Akanksha Driver

WPtel:[phone]

                                        [address] [place], [place] [id number]

                                              FOLLOW UP       2010

 

             Patient Education: Patient Medication Summary                      

     Completed    2010

 

                                        Appointment: Akanksha Driver

WPtel:[phone]

                                        [address] [place], [place] [id number]

US                                              LAB             2010

 

             Patient Education: Patient Medication Summary                      

     Completed    2010

 

                          Visit Plan:               Check fasting lab in AM--CMP

,Lipids, CBC, Vit D, TSH,FreeT4, B12

                                                            2010

 

                                        Appointment: Akanksha Driver

WPtel:[phone]

                                        [address] [place], [place] [address]                                              FOLLOW UP       2010

 

             Patient Education: Patient Medication Summary                      

     Completed    2010

 

                                        Referral: Landon De La Torre

WPtel:[phone]

#1 [facility]66[facility]              Referral                        Appointment Requested  

 

                                        Referral: Landon De La Torre

WPtel:[phone]

#[address]              Referral                        Initiated        







Instructions





                                        Comment

 

                                        . Supportive care.  Rest, Fluids, Tyleno

l/Motrin prn fever or bodyaches.  Notify

if worsening symptoms.



 

                                        . Add miralax daily

Use daily probiotic and metamucil and push fluids

Notify if worsens/persists

 

                                        . No NSAIDs

Kidney function discussed

Discussed hydration 

Monitor lab every 4months so will check CMP, HbA1C next month

 

                                        . Vestibular exercises

Refill flonase

Strict low Na diet--discussed that needs to read labels

Rx for walker with chair given due to muscle weakness/unsteadiness

Has carotids and abdominal aorta and legs checked in January

Check Chem 7



 

                                        . Check CMP, CBC, TSH, Free T4, B12, Lip

ids, HbA1C

Discussed 6 small meals a day each with protein

Would likely benefit from antidepressant 

Update colonoscopy

 

                                        . Cerumen flush with warm water and tyler

xide - good results 

Resume Nasonex nasal spray daily

Recommended Debrox earwax removal drops 

 

                                        . Continue aspirin daily

Add meloxicam for 1week

Elevate and Ice

Call on Monday

 

                                        . Been using baclofen at bedtime and has

 helped some

PT for next 2-4weeks



 

                                        . Continue exercise and current meds

See surgery for removal of right arm lesion

 

                                        . Discussed that likely lumbar etiology 

for leg weakness

Will change amlodopine to low dose dyazide and see if helps legs and inner ear

 

                                        . Ceftin and continue claritin/flonase

Decrease amlodopine to 2.5mg q HS until fwup with Card due to weakness

 

                                        .  Supportive care.  Rest, Fluids, Tylen

ol prn fever or bodyaches.  Notify if 

worsening symptoms.

Ceftin

 

                                        . Restart flonase BID

Use meclizine 25mg q HS

Vestibular exercises

Claritin 10mg q AM

See ENT if doesn't resolve

 

                                        . Will continue to observe



 

                                        . ERx for Augmentin 875mg q12 x 10 days 

and Floxin otic drops 5 gtts AD x7days

Sample of Nasonex per pt request

Discussed treatment (nasal saline, salt water gargles, keeping right ear clean 
and dry, for worsening go to UC on weekend, Tylenol/ibuprofen, etc.)

RTC 2 weeks for ear recheck.

 

                                        . Decrease caffeine intake

Check Bilateral Mammogram with US of left breast

 

                                        Left ear flushed with warm water and per

oxide with ear syringe and then last 

removed with currette--peroxide drops instilled.  Tolerated well, no 
complications, TM intact.. Continue off carafate and see how does

Continue probiotic

Add Vestibular exercises and use meclizine prn

Continue Nasonex

Check fasting lab including CMP, Lipids, CBC, TSH, Free T4, HbA1C

 

                                        . Continue carafate for 4more weeks at c

urrent dose then decrease to BID for 

2wks then q HS

 

                                        . Continue omeprazole

Add Carafate 1gm po q AC

Add daily probiotic



 

                                        . Diabetic Diet

Accuchecks BID alternating times

Hold onglyza and Januvia for now and continue diet and exercise and weight loss 
and moniter BS

Discussed hypoglycemia and snack of peanut butter and crackers with juice or 
milk

 

                                        Informed Consent obtainded.  Right SI yasir

int cleansed with alcohol and betadine 

and injected with 3cc 1%l lidocaine with 40mg depomedrol and 40mg kenalog, 
tolerated well with no complications, neosporin and bandage applied. Injection 
to Right SI joint as above

Pt will call in 3 days on pain

Has PT starting in 2wks.

 

                                        . OMT done

Start PT

May need updated MRI if need to consider epidural

Prednisone

 

                                        . Flexeril and Vimovo

OMT done

Daily stretches

 

                                        Right SI joint cleansed with alchohol an

d betadine and injected with 3cc 1% 

lidocaine with 40mg depomedrol and 40mg kenalog, tolerated well with no 
complications, neosporin and bandage applied. OMT done

Daily stretches

Moist heat or biofreeze

Right SI joint injection

Call in 1week

Vimovo BID

 

                                        . Decrease amlodopine to 1.25mg daily

Schedule with Cardiology

Fasting lab in AM

 

                                        .  Saline nasal flushes prn. Tylenol/Mot

rin prn headache.  Notify if 

persists/symptoms worsening.

Cerumen removal from ears as above



 

                                        . Continue vimovo for 1more week

Increase Omeprazole to BID for 1mo then resume QD if stomach improved

Vestibular exercises with meclizine q HS for next week

 

                                        . Saline nasal flushes prn. Tylenol/Motr

in prn headache.  Notify if 

persists/symptoms worsening.

Add Veramyst

Increase Omeprazole to 20mg po BID

 

                                        . Nasocourt sample given.  Pt. will noti

fy if symptoms are worse on Monday. 

 

                                        . May proceed with hiatal hernia repair 

per Card. so will contact Dr. Cash's office to proceed with surgery



Trial of Lexapro 5mg QD plus use xanax prn

 

                                        . B12 given

Cont oral B12 and iron

Fwup 1mo for B12

Proceed with hiatal hernia repair once card clearance

 

                                        . Check fasting lab in AM--CMP,Lipids, C

BC, Vit D, TSH,FreeT4, B12







Medical Equipment

No Medical Equipment data



Health Concerns Section

Health Concerns data not found



Goals Section

Goals data not found



Interventions Section

Interventions data not found



Health Status Evaluations/Outcomes Section

Health Status Evaluations/Outcomes data not found



Advance Directives

No Advance Directive data

## 2020-02-19 NOTE — XMS REPORT
CCD document using C-CDA

                             Created on: 2020



Leilani Ramírez

External Reference #: [id number]

: 1940

Sex: Female



Demographics





                          Address                   [address] [place]  [address]

 

                          Home Phone                [phone]

 

                          Preferred Language        Unknown

 

                          Marital Status            

 

                          Pentecostalism Affiliation     Unknown

 

                          Race                      White

 

                          Ethnic Group              Not  or 





Author





                          Author                    Leilani Driver D.O.

 

                          Organization              [facility]

 

                          Address                   [address] [place]  [address]



 

                          Phone                     [phone]







Care Team Providers





                    Care Team Member Name Role                Phone

 

                    Akanksha Driver D.O., PP                  Unavailable

 

                          CCM                       Unavailable







Summary Purpose

Interface Exchange



Insurance Providers





             Payer name   Policy type / Coverage type Covered party ID Effective

 Begin Date 

Effective End Date

 

             [place] [place] B [id number]  2018   

  Unknown

 

             [employer] Senior Supplemental Medicare Part B [id number]   [id number]    

 Unknown







Family history





Father



                          Diagnosis                 Age At Onset

 

                          Heart disease             Unknown







Mother



                          Diagnosis                 Age At Onset

 

                          Heart disease             Unknown

 

                          Cancer                    Unknown







Social History





                Social History Element Codes           Description     Effective

 Dates

 

                Tobacco history SNOMED CT: 211679160 Never smoker    2011

 

                Marital status  Unknown         Single          2010

 

                Number of children Unknown         9               2010







Allergies, Adverse Reactions, Alerts





           Substance  Reaction   Codes      Entered Date Inactivated Date Status

 

           _                     Unknown    2019 No Inactive Date Active

 

           * NO KNOWN FOOD ALLERGIES            Unknown    2010 No Inactiv

e Date Active

 

           _                     Unknown    2010 No Inactive Date Active

 

           QUINIDINE-QUININE ANALOGUES hives,     Unknown    2010 No Inact

diamante Date Active







Problems





                Condition       Codes           Effective Dates Condition Status

 

                          Essential hypertension    ICD-9: 401.9

ICD-10: I10               2014                Active

 

                          Palpitations              ICD-9: 785.1

ICD-10: R00.2             10/02/2018                Active

 

                          Stress reaction           ICD-9: 308.9

ICD-10: F43.0             2020                Active

 

                          Mixed hyperlipidemia      ICD-9: 272.4

ICD-10: E78.2             2019                Active

 

                          FLU VACCINE               ICD-9: V04.81

ICD-10: Z23               2012                Active

 

                          Acute serous otitis media, left ear ICD-9: 381.01

ICD-10: H65.02            2019                Active

 

                          Coronary atherosclerosis due to calcified coronary les

ion ICD-9: 414.00

ICD-10: I25.84            2018                Active

 

                          Hypoglycemia, unspecified ICD-9: 251.2

ICD-10: E16.2             2017                Active

 

                          Other fatigue             ICD-9: 780.79

ICD-10: R53.83            2017                Active

 

                          Urinary tract infection, site not specified ICD-9: 599

.0

ICD-10: N39.0             10/02/2018                Active

 

                          Gastro-esophageal reflux disease without esophagitis I

CD-9: 530.81

ICD-10: K21.9             2018                Active

 

                          Epigastric pain           ICD-9: 789.06

ICD-10: R10.13            2018                Active

 

                          Atherosclerotic heart disease of native coronary arter

y without angina pectoris 

ICD-9: 414.00

ICD-10: I25.10            2018                Active

 

                          Generalized anxiety disorder ICD-9: 300.00

ICD-10: F41.1             2018                Active

 

                          Dizziness and giddiness   ICD-9: 780.4

ICD-10: R42               2014                Active

 

                          Occlusion and stenosis of bilateral carotid arteries I

CD-9: 433.10

ICD-10: I65.23            2018                Active

 

                          Cervicalgia               ICD-9: 723.1

ICD-10: M54.2             2018                Active

 

                          Other spondylosis with radiculopathy, cervical region 

ICD-9: 721.0

ICD-10: M47.22            2018                Active

 

                          Cervicocranial syndrome   ICD-9: 723.2

ICD-10: M53.0             2018                Active

 

                          Vertigo of central origin, bilateral ICD-9: 386.2

ICD-10: H81.43            2018                Active

 

                          Benign paroxysmal vertigo, bilateral ICD-9: 386.11

ICD-10: H81.13            2018                Active

 

                          Otalgia, bilateral        ICD-9: 388.70

ICD-10: H92.03            2018                Active

 

                          Left lower quadrant pain  ICD-9: 789.04

ICD-10: R10.32            2017                Active

 

                          Low back pain             ICD-9: 724.2

ICD-10: M54.5             2017                Active

 

                          Radiculopathy, lumbosacral region ICD-9: 724.4

ICD-10: M54.17            2018                Active

 

                          Impaired fasting glucose  ICD-9: 790.29

ICD-10: R73.01            2012                Active

 

                          Other intervertebral disc degeneration, lumbar region 

ICD-9: 722.52

ICD-10: M51.36            2017                Active

 

                          Pain in thoracic spine    ICD-9: 724.1

ICD-10: M54.6             2017                Active

 

                          Mastodynia                ICD-9: 611.71

ICD-10: N64.4             2017                Active

 

                          Acute upper respiratory infection, unspecified ICD-9: 

465.9

ICD-10: J06.9             2017                Active

 

                          Acute mastoiditis without complications, right ear ICD

-9: 383.00

ICD-10: H70.001           2016                Active

 

                          Constipation, unspecified ICD-9: 564.00

ICD-10: K59.00            2016                Active

 

                          Chronic kidney disease, stage 1 ICD-9: 585.1

ICD-10: N18.1             03/15/2016                Active

 

                          History of falling        ICD-9: V15.88

ICD-10: Z91.81            12/15/2015                Active

 

                          Muscle weakness (generalized) ICD-9: 780.79

ICD-10: M62.81            2015                Active

 

                Acute renal failure ICD-9: 584.9    2015      Active

 

                POLYURIA        ICD-9: 788.42   2015      Active

 

                CAD             ICD-9: 414.00   09/10/2015      Active

 

                Hyperglycemia   ICD-9: 790.29   2012      Active

 

                HYPERLIPIDEMIA NEC/NOS ICD-9: 272.4    09/10/2015      Active

 

                ABDOMINAL PAIN  ICD-9: 789.00   10/10/2012      Active

 

                Grieving        ICD-9: 309.0    2015      Active

 

                HYPOGLYCEMIA    ICD-9: 251.2    2012      Active

 

                MALAISE AND FATIGUE ICD-9: 780.79   2015      Active

 

                CERUMEN IMPACTION ICD-9: 380.4    2015      Active

 

                Leg pain        ICD-9: 729.5    2015      Active

 

                SUPERFIC PHLEBITIS-LEG ICD-9: 451.0    2015      Active

 

                SPASM OF MUSCLE ICD-9: 728.85   2015      Active

 

                Thoracic back pain ICD-9: 724.1    2015      Active

 

                Benign positional vertigo ICD-9: 386.11   2015      Active

 

                Suspicious nevus ICD-9: 238.2    2015      Active

 

                EUSTACHIAN TUBE DYSFUNCTION ICD-9: 381.81   2014      Acti

ve

 

                SINUSITIS, ACUTE ICD-9: 461.9    2014      Active

 

                DIZZINESS/VERTIGO ICD-9: 780.4    2014      Active

 

                HYPERTENSION    ICD-9: 401.9    2014      Active

 

                URI, ACUTE      ICD-9: 465.9    10/15/2013      Active

 

                CEPHALGIA       ICD-9: 784.0    2013      Active

 

                OTITIS MEDIA NOS ICD-9: 382.9    2013      Active

 

                Perforation of ear drum ICD-9: 384.20   05/10/2013      Active

 

                Mastalgia       ICD-9: 611.71   2013      Active

 

                DYSPEPSIA       ICD-9: 536.8    10/10/2012      Active

 

                IBS             ICD-9: 564.1    10/10/2012      Active

 

                FLU VACCINE     ICD-9: V04.81   2012      Active

 

                Radiculopathy of leg ICD-9: 724.4    2012      Active

 

                SCIATICA        ICD-9: 724.3    2012      Active

 

                Lumbar degenerative disc disease ICD-9: 722.52   2012     

 Active

 

                Sacroiliac dysfunction ICD-9: 739.4    2012      Active

 

                Hypertension    Unknown         2012      Active

 

                PAIN, LOWER BACK ICD-9: 724.2    2011      Active

 

                SPINAL ENTHESOPATHY ICD-9: 720.1    2011      Active

 

                Hypotension     ICD-9: 458.9    2011      Active

 

                SACROILIITIS NEC ICD-9: 720.2    2011      Active

 

                PHARYNGITIS, ACUTE ICD-9: 462      2010      Active

 

                URINARY TRACT INFECTION ICD-9: 599.0    2010      Active

 

                Heat exhaustion ICD-9: 992.5    2010      Active

 

                Esophagitis     ICD-9: 530.10   2010      Active

 

                Gastritis       ICD-9: 535.50   2010      Active

 

                GERD            ICD-9: 530.81   2010      Active

 

                Hiatal hernia   ICD-9: 553.3    2010      Active

 

                B12 DEFIC ANEMIA NEC ICD-9: 281.1    2010      Active

 

                Anxiety         Unknown         2010      Active

 

                Heart disease   Unknown         2010      Active

 

                Hyperlipidemia  Unknown         2010      Active

 

                ANXIETY STATE NOS ICD-9: 300.00   2010      Active

 

                DEPRESSIVE DISORDER NEC ICD-9: 311      2010      Active







Medications





          Medication Codes     Instructions Start Date Stop Date Status    Fill 

Instructions

 

                    Flonase Allergy Relief 50 mcg/actuation nasal spray,suspensi

on RxNorm: 5092919     2

Spray Nasal every night at bedtime 2020      Inactive     

    

 

           simvastatin 40 mg tablet RxNorm: 719142 1 Tablet(s) PO QD 2019 

02/15/2020 

Active                                   

 

                omeprazole 40 mg capsule,delayed release RxNorm: 629571  1 Capsu

le(s) Oral QD 

2019          Active               

 

                Xanax 0.25 mg tablet RxNorm: 291139  TAKE 1 TABLET BY MOUTH TWIC

E DAILY 

10/29/2019          No Stop Date        Active               

 

                omeprazole 40 mg capsule,delayed release RxNorm: 821581  1 Capsu

le(s) PO QD 

10/07/2019          2019          Inactive             

 

             metoprolol tartrate 25 mg tablet RxNorm: 650505 1 Tablet(s) PO BID 

2019                Active                     

 

                omeprazole 40 mg capsule,delayed release RxNorm: 573064  1 Capsu

le(s) PO QD 

2019          10/06/2019          Inactive             

 

                    Flonase Allergy Relief 50 mcg/actuation nasal spray,suspensi

on RxNorm: 7747431     2

[facility] 2019      Inactive         

 

           simvastatin 40 mg tablet RxNorm: 544362 1 Tablet(s) PO QD 2019 

Inactive                                 

 

           simvastatin 40 mg tablet RxNorm: 485715 1 Tablet(s) PO QD 2019 

Inactive                                 

 

                omeprazole 40 mg capsule,delayed release RxNorm: 192625  1 Capsu

le(s) PO QD 

2019          Inactive             

 

           simvastatin 40 mg tablet RxNorm: 591164 1 Tablet(s) PO QD 2019 

Inactive                                 

 

                omeprazole 40 mg capsule,delayed release RxNorm: 368399  1 Capsu

le(s) PO QD 

2019          Inactive             

 

             Bactrim  mg-160 mg tablet RxNorm: 143091 1 Tablet(s) PO BID 0

3/08/2019   

2019                Inactive                   

 

             Bactrim  mg-160 mg tablet RxNorm: 596613 1 Tablet(s) PO BID 0

3/08/2019   

2019                Inactive                   

 

             Macrobid 100 mg capsule RxNorm: 413787 1 Capsule(s) PO BID 20

19   2019

                          Inactive                   

 

             metoprolol tartrate 25 mg tablet RxNorm: 875387 1 Tablet(s) PO BID 

2019                Inactive                   

 

                Xanax 0.25 mg tablet RxNorm: 595506  TAKE 1 TABLET BY MOUTH TWIC

E DAILY 

2018          10/28/2019          Inactive             

 

           Xanax 0.25 mg tablet RxNorm: 066196 1 Tablet(s) PO BID 2018 

Inactive                                 

 

                    Protonix 40 mg tablet,delayed release RxNorm: 606049      1 

Tablet(s) PO BID for 

stomach--replaces omeprazole 2018      Inactive         

 

             Carafate 1 gram tablet RxNorm: 847043 1 Tablet(s) PO AC & HS 2019                Inactive                   

 

           Xanax 0.25 mg tablet RxNorm: 645008 1 Tablet(s) PO BID 10/30/2018 12/

10/2018 

Inactive                                 

 

             Bactrim  mg-160 mg tablet RxNorm: 824253 1 Tablet(s) PO BID 1

   

10/08/2018                Inactive                   

 

             Bactrim  mg-160 mg tablet RxNorm: 764402 1 Tablet(s) PO BID 1

   

10/03/2018                Inactive                   

 

             Carafate 1 gram tablet RxNorm: 810747 1 Tablet(s) PO AC & HS 09/18/

2018   

10/17/2018                Inactive                   

 

                    Protonix 40 mg tablet,delayed release RxNorm: 213645      1 

Tablet(s) PO BID for 

stomach--replaces omeprazole 2018      Inactive         

 

                    Protonix 40 mg tablet,delayed release RxNorm: 217694      1 

Tablet(s) PO BID for 

stomach--replaces omeprazole 2018      Inactive         

 

                    fluticasone 50 mcg/actuation nasal spray,suspension RxNorm: 

8822542     2 Spray 

NASAL QD to each nostril 2018      Inactive         

 

             Nasonex 50 mcg/actuation Spray RxNorm: 2968048 2 [facility] 2018                Inactive                   

 

                omeprazole 40 mg capsule,delayed release RxNorm: 944874  1 Capsu

le(s) PO QD 

2018          08/15/2018          Inactive             

 

                    simvastatin 40 mg tablet RxNorm: 874076      1 Tablet(s) PO 

QD TAKE 1 TABLET EVERY 

DAY             2018      Inactive         

 

             metoprolol tartrate 25 mg tablet RxNorm: 138540 1/2 Tablet(s) PO QD

 2018                Inactive                   

 

                simvastatin 40 mg tablet RxNorm: 493721  Tablet(s) TAKE 1 TABLET

 EVERY DAY 

2017          Inactive             

 

             metoprolol tartrate 25 mg tablet RxNorm: 807940 1/2 Tablet(s) PO QD

 2017                Inactive                   

 

                    Flonase 50 mcg/actuation nasal spray,suspension RxNorm: 1797

933     2 [facility] 

BID             2017      Inactive         

 

                omeprazole 40 mg capsule,delayed release RxNorm: 440026  1 Capsu

le(s) PO QD 

2017          Inactive             

 

             metoprolol tartrate 25 mg tablet RxNorm: 642807 1/2 Tablet(s) PO QD

 04/10/2017   

2017                Inactive                   

 

                    ciprofloxacin 0.2 % ear drops in a dropperette RxNorm: 43865

6      4 Drop(s) OTIC TID

for 1 week      2016      Inactive         

 

           cefdinir 300 mg capsule RxNorm: 988900 2 Capsule(s) PO QD 2016 

Inactive                                 

 

                    omeprazole 40 mg capsule,delayed release RxNorm: 217505     

 TAKE 1 CAPSULE EVERY DAY

                2016      Inactive         

 

             simvastatin 40 mg tablet RxNorm: 730079 TAKE 1 TABLET EVERY DAY 2017                Inactive                   

 

                    Flonase 50 mcg/actuation nasal spray,suspension RxNorm: 1797

933     2 [facility] 

BID             2015      Inactive         

 

             cefuroxime axetil 250 mg tablet RxNorm: 489184 1 Tablet(s) PO BID 0

2015                Inactive                   

 

             cefuroxime axetil 250 mg tablet RxNorm: 669711 1 Tablet(s) PO BID 0

2015                Inactive                   

 

                omeprazole 40 mg capsule,delayed release RxNorm: 316055  1 Capsu

le(s) PO QD 

2015          Inactive             

 

           meloxicam 7.5 mg tablet RxNorm: 099369 1 Tablet(s) PO QD 2015 0

2015 

Inactive                                 

 

                loratadine 10 mg tablet RxNorm: 866052  1 Tablet(s) PO QAM for a

llergies 

2014          Inactive             

 

                    Flonase 50 mcg/actuation nasal spray,suspension RxNorm: 8963

23      2 [facility] BID

                2014      12/15/2015      Inactive         

 

           prednisone 20 mg tablet RxNorm: 953075 2 Tablet(s) PO BID 2014 

Inactive                                 

 

             Dyazide 37.5 mg-25 mg capsule RxNorm: 613705 1 Capsule(s) PO QAM 2014                Inactive                   

 

           Ceftin 500 mg tablet RxNorm: 724578 1 Tablet(s) PO BID 2014 

Inactive                                 

 

           Ceftin 500 mg tablet RxNorm: 825074 1 Tablet(s) PO BID 2014 

Inactive                                 

 

                    simvastatin 40 mg tablet RxNorm: 222468      Tablet(s) PO TA

KE ONE TABLET BY MOUTH 

EVERY DAY       2014      Inactive         

 

                    metoprolol tartrate 25 mg tablet RxNorm: 246919      Tablet(

s) PO TAKE ONE-HALF 

TABLET BY MOUTH EVERY DAY 2014      Inactive         

 

           Ceftin 500 mg tablet RxNorm: 172290 1 Tablet(s) PO BID 10/15/2013 10/

 

Inactive                                 

 

                loratadine 10 mg tablet RxNorm: 093590  1 Tablet(s) PO QAM for a

llergies 

2013          Inactive             

 

                    omeprazole 20 mg capsule,delayed release RxNorm: 539077     

 Capsule(s) PO TAKE ONE 

CAPSULE BY MOUTH TWICE DAILY 2013      Inactive         

 

                omeprazole 20 mg capsule,delayed release RxNorm: 489179  1 Capsu

le(s) PO BID 

2013          Inactive             

 

             Augmentin 875 mg-125 mg tablet RxNorm: 425705 1 Tablet(s) PO Q12H 0

5/10/2013   

2013                Inactive                   

 

             Floxin Otic Drops 1 bottle Drops RxNorm:      5 Drop(s) OTIC BID 05

/10/2013   

2013                Inactive                   

 

                    metoprolol tartrate 25 mg tablet RxNorm: 509852      Tablet(

s) PO TAKE ONE-HALF 

TABLET BY MOUTH EVERY DAY 2013      Inactive         

 

                    simvastatin 40 mg tablet RxNorm: 398096      Tablet(s) PO TA

KE ONE TABLET BY MOUTH 

EVERY DAY       2013      Inactive         

 

                lancets         RxNorm:         Misc Miscellaneous USE ONE TO CH

YOGI GLUCOSE EVERY DAY 

2013          Inactive             

 

             Carafate 1 gram tablet RxNorm: 621582 1 Tablet(s) PO AC & HS 2015                Inactive                   

 

           Flagyl 500 mg tablet RxNorm: 441516 1 Tablet(s) PO TID 10/10/2012 10/

 

Inactive                                 

 

             Carafate 1 gram tablet RxNorm: 228969 1 Tablet(s) PO AC & HS 10/10/

2012   

2012                Inactive                   

 

          One Touch Test strips RxNorm:   Miscellaneous QD 2012

 Inactive  

one touch ultra mini test strips

 

           Protonix 40 mg Tab RxNorm: 358333 1 Tablet(s) PO QD 2012 

Inactive                                 

 

           Protonix 40 mg Tab RxNorm: 972789 1 Tablet(s) PO QD 2012 10/09/

2012 

Inactive                                 

 

           prednisone 20 mg Tab RxNorm: 096506 1 Tablet(s) PO BID 2012 

Inactive                                 

 

             Flexeril 5 mg Tab RxNorm: 190781 1 Tablet(s) PO QHS for spasm 2012                Inactive                   

 

             Flexeril 5 mg Tab RxNorm: 278309 1 Tablet(s) PO QHS for spasm 2012                Inactive                   

 

                amlodipine 2.5 mg Tab RxNorm: 077785  1/2 Tablet(s) PO QD replac

es 5mg dose 

2012          Inactive             

 

           simvastatin 40 mg tablet RxNorm: 431558 1 Tablet(s) PO QD 2012 

Inactive                                 

 

             metoprolol tartrate 25 mg tablet RxNorm: 619626 1/2 Tablet(s) PO QD

 2012                Inactive                   

 

                omeprazole 20 mg capsule,delayed release RxNorm: 170529  1 Capsu

le(s) PO BID 

2012          Inactive             

 

           cefdinir 300 mg Cap RxNorm: 767354 2 Capsule(s) PO QD 2011 

Inactive                                 

 

           simvastatin 40 mg Tab RxNorm: 052817 1 Tablet(s) PO QD 2011 

Inactive                                 

 

             metoprolol tartrate 25 mg Tab RxNorm: 906297 1/2 Tablet(s) PO QD 10

/   

2012                Inactive                   

 

             metoprolol tartrate 25 mg Tab RxNorm: 567975 1/2 Tablet(s) PO QD 10

/   

10/24/2011                Inactive                   

 

           meclizine 25 mg Tab RxNorm: 5287551 1 Tablet(s) PO QHS 2011 

Inactive                                for dizziness

 

           Ceftin 500 mg Tab RxNorm: 534866 1 Tablet(s) PO BID 2011 

Inactive                                 

 

                omeprazole 20 mg Cap, Delayed Release RxNorm: 870736  1 Capsule(

s) PO BID 

2011          10/24/2011          Inactive             

 

           simvastatin 40 mg Tab RxNorm: 806227 1 Tablet(s) PO QD 2011 

Inactive                                 

 

           simvastatin 40 mg Tab RxNorm: 751418 1 Tablet(s) PO QD 2011 

Inactive                                 

 

           simvastatin 40 mg Tab RxNorm: 707274 1 Tablet(s) PO QD 2010 

Inactive                                 

 

             Tessalon Perles 100 mg Cap RxNorm: 316412 1 Capsule(s) PO Q6-8H 2010                Inactive                   

 

           cefdinir 300 mg Cap RxNorm: 086579 1 Capsule(s) PO BID 2010 

Inactive                                 

 

           Lexapro 10 mg Tab RxNorm: 761471 1 Tablet(s) PO QD 2010

010 

Inactive                                 

 

           Cipro 250 mg Tab RxNorm: 238878 1 Tablet(s) PO BID 2010 10/04/2

010 

Inactive                                 

 

             Wellbutrin  mg 24 hr Tab RxNorm: 580403 1 Tablet(s) PO QAM 2010                Inactive                   

 

          Fish Oil 1,000 mg Cap RxNorm:   1 Capsule(s) PO QD No Start Date      

     Active     

 

                Tylenol Extra Strength 500 mg tablet RxNorm: 557258  1/2 Tablet(

s) PO as needed 

No Start Date                           Active               

 

                nitroglycerin 0.4 mg sublingual tablet RxNorm: 875426  Tablet(s)

 SL as needed No 

Start Date                              Active               

 

                    Calcium with Vitamin D 600 mg (1,500 mg)-400 unit tablet RxN

orm: 885056      1 

Tablet(s) PO QD No Start Date                   Active           

 

           Multivitamin & Mineral Formula Tab RxNorm:    1 Tablet(s) PO QD No St

art Date            

Active                                   

 

          vitamin B complex capsule RxNorm:   1 Capsule(s) PO QD No Start Date  

         Active     

 

          Aspirin 81 mg Tab RxNorm: 133645 1 Tablet(s) PO QD No Start Date      

     Active     

 

                    isosorbide mononitrate ER 30 mg tablet,extended release 24 h

r RxNorm: 923422      1 

Tablet(s) PO QHS No Start Date                   Active           

 

           Vitamin D 1,000 unit Cap RxNorm: 789453 1 Capsule(s) PO QD No Start D

ate            

Active                                   

 

          Co Q-10 oral RxNorm: 08738 oral      No Start Date           Active   

  

 

             metoprolol tartrate 25 mg Tab RxNorm: 692983 1/2 Tablet(s) PO QD No

 Start Date 

10/23/2011                Inactive                   

 

             Nasonex 50 mcg/actuation Spray RxNorm: 2728710 2 Spray NASAL No Sta

rt Date 

2018                Inactive                   

 

           Vitamin B12 1000mcg Tablet RxNorm:    1 Tablet(s) PO QD No Start Date

 2015 

Inactive                                 

 

           amlodipine 5 mg Tab RxNorm: 210159 1 Tablet(s) PO QD No Start Date  

Inactive                                 

 

             simvastatin 40 mg tablet RxNorm: 559377 1 Tablet(s) PO QD No Start 

Date 

2019                Inactive                   

 

                omeprazole 20 mg capsule,delayed release RxNorm: 271367  1 Capsu

le(s) PO BID No 

Start Date          2013          Inactive             

 

                omeprazole 40 mg capsule,delayed release RxNorm: 540656  1 Capsu

le(s) PO QD No 

Start Date          2019          Inactive             

 

           Nexium 40 mg Cap RxNorm: 799711 1 Capsule(s) PO QD No Start Date  

Inactive                                 

 

             Stool Softener 100 mg Tab RxNorm: 1326458 2 Tablet(s) PO BID No Sta

rt Date 

2012                Inactive                   

 

                    Calcium with Vitamin D 600 mg (1,500 mg)-400 unit Tab RxNorm

: 934702      1 Tablet(s)

PO QD           No Start Date   2015      Inactive         

 

             iron 325 mg (65 mg iron) Tab RxNorm: 896084 1 Tablet(s) PO QD No St

art Date 

2015                Inactive                   

 

                Flonase 50 mcg/actuation Nasal Spray RxNorm: 548612  2 [place] ROZ

AL BID No Start 

Date                2014          Inactive             

 

                    fluticasone 50 mcg/actuation nasal spray,suspension RxNorm: 

3561491     2 Spray 

NASAL QD to each nostril No Start Date   2018      Inactive         

 

             Nasonex 50 mcg/actuation Spray RxNorm: 6178772 2 Spray NASAL QD No 

Start Date 

2018                Inactive                   

 

                    hydralazine 50 mg tablet RxNorm: 452162      1 Tablet(s) PO 

as needed for BP over 

160/90          No Start Date   2018      Inactive         

 

             Vimovo 500 mg-20 mg 12 hr Tab RxNorm: 488134 1 Tablet(s) PO BID No 

Start Date 

2011                Inactive                   

 

             Tylenol PM 25 mg-500 mg/15 mL Oral Soln RxNorm: 0518221 1 PO QPM   

  No Start Date 

2015                Inactive                   

 

          Iron (Ferrous Sulfate) Oral RxNorm:   Oral      No Start Date 20

12 Inactive   

 

             Reglan 10 mg Tab RxNorm: 575523 1 Tablet(s) PO TID before meals No 

Start Date 

2011                Inactive                   

 

           Xanax 1 mg Tab RxNorm: 541939 Tablet(s) PO QD No Start Date  

Inactive                                 

 

             amlodipine 10 mg tablet RxNorm: 475160 1 Tablet(s) PO QD No Start D

ate 

2014                Inactive                   

 

          Iron (dried) Oral RxNorm:   Oral      No Start Date 2012 Inactiv

e   

 

                metoprolol tartrate 50 mg tablet RxNorm: 403786  1 Tablet(s) PO 

BID No Start Date

                    12/10/2018          Inactive             

 

           Xanax 0.25 mg tablet RxNorm: 924796 1 Tablet(s) PO BID No Start Date 

10/29/2018 

Inactive                                 

 

                lancets         RxNorm:         Miscellaneous check blood sugar 

at least once daily No Start 

Date                2013          Inactive             

 

           simvastatin 40 mg Tab RxNorm: 826653 1 Tablet(s) PO QD No Start Date 

2010 

Inactive                                 

 

                    isosorbide mononitrate ER 30 mg tablet,extended release 24 h

r RxNorm: 777530      1 

Tablet(s) PO QHS No Start Date   2019      Inactive         

 

             amlodipine 2.5 mg tablet RxNorm: 603413 1 Tablet(s) PO QD No Start 

Date 

2014                Inactive                   

 

             sucralfate 1 gram tablet RxNorm: 318461 1 Tablet(s) PO QID No Start

 Date 

2019                Inactive                   

 

          Fish Oil Oral RxNorm:   Oral      No Start Date 2012 Inactive   

 

          Multiple Vitamin Oral RxNorm:   Oral      No Start Date 2012 [facility]

ctive   

 

                metoprolol tartrate 25 mg tablet RxNorm: 387774  1/2 Tablet(s) P

O QD No Start 

Date                2017          Inactive             

 

                metoprolol tartrate 50 mg tablet RxNorm: 469568  1/2 Tablet(s) P

O BID No Start 

Date                2019          Inactive             

 

                    Flonase Allergy Relief 50 mcg/actuation nasal spray,suspensi

on RxNorm: 0562259     2

[place] NASAL QHS No Start Date   2019      Inactive         







Medication Administered

No Medication Administered data



Immunizations





                Vaccine         Codes           Date            Status

 

                Influenza       CVX: 135        10/22/2019      Complete

 

                Influenza       CVX: 135        10/22/2019      Complete

 

                Influenza       CVX: 135        2018      Complete

 

                Influenza       CVX: 135        10/06/2017      Complete

 

                Influenza       CVX: 135        10/07/2016      Complete

 

                Influenza       CVX: 135        10/16/2015       

 

                Influenza       CVX: 141        2013       

 

                Influenza (Adult) CVX: 141        10/06/2010       







Results





          Observation Observation Code Item      Item Code Result    Date      S

ervice Location

 

          GFR CALC  5780333   GFR Non Afr Amr           52 mL/min 10/11/2019 Unk

nown

 

          GFR CALC  3965466   GFR Afr Amr           >60 mL/min 10/11/2019 Unknow

n

 

          COMPREHENSIVE METABOLIC 96636     AST                 17 U/L    10/11/

2019 Unknown

 

          COMPREHENSIVE METABOLIC 72255     ALT                 11 U/L    10/11/

2019 Unknown

 

          COMPREHENSIVE METABOLIC 40034     BUN                 17 mg/dL  10/11/

2019 Unknown

 

          COMPREHENSIVE METABOLIC 22443     ALBUMIN             3.9 g/dL  10/11/

2019 Unknown

 

          COMPREHENSIVE METABOLIC 19857     CHLORIDE            108 mmol/L 10/11

/2019 Unknown

 

          COMPREHENSIVE METABOLIC 55906     Bili Total           0.5 mg/dL 10/11

/2019 Unknown

 

          COMPREHENSIVE METABOLIC 91449     ALK PHOS            72 U/L    10/11/

2019 Unknown

 

          COMPREHENSIVE METABOLIC 31944     SODIUM              142 mmol/L 10/11

/2019 Unknown

 

          COMPREHENSIVE METABOLIC 50209     CREATININE           1.02 mg/dL 10/1

2019 Unknown

 

          COMPREHENSIVE METABOLIC 89276     CALCIUM             9.3 mg/dL 10/11/

2019 Unknown

 

          COMPREHENSIVE METABOLIC 74227     POTASSIUM           4.0 mmol/L 10/11

/2019 Unknown

 

          COMPREHENSIVE METABOLIC 07915     Total Protein           6.2 g/dL  10

/2019 Unknown

 

          COMPREHENSIVE METABOLIC 61616     Glucose             93 mg/dL  10/11/

2019 Unknown

 

          COMPREHENSIVE METABOLIC 76257     Bicarbonate           27 mmol/L 10/1

2019 Unknown

 

          COMPREHENSIVE METABOLIC 97035     AGAP                7 mmol/L  10/11/

2019 Unknown

 

          GFR CALC  8576898   GFR Non Afr Amr           48 mL/min 06/10/2019 Unk

nown

 

          GFR CALC  2241965   GFR Afr Amr           59 mL/min 06/10/2019 Unknown

 

          THYROID STIMULATING HORMONE 67165     TSH                 1.936 uIU/mL

 06/10/2019 Unknown

 

          COMPREHENSIVE METABOLIC 27481     AST                 18 U/L    06/10/

2019 Unknown

 

          COMPREHENSIVE METABOLIC 36587     ALT                 11 U/L    06/10/

2019 Unknown

 

          COMPREHENSIVE METABOLIC 95803     BUN                 18 mg/dL  06/10/

2019 Unknown

 

          COMPREHENSIVE METABOLIC 77374     ALBUMIN             4.2 g/dL  06/10/

2019 Unknown

 

          COMPREHENSIVE METABOLIC 96282     CHLORIDE            109 mmol/L 06/10

/2019 Unknown

 

          COMPREHENSIVE METABOLIC 63108     Bili Total           0.4 mg/dL 06/10

/2019 Unknown

 

          COMPREHENSIVE METABOLIC 78075     ALK PHOS            64 U/L    06/10/

2019 Unknown

 

          COMPREHENSIVE METABOLIC 13341     SODIUM              142 mmol/L 06/10

/2019 Unknown

 

          COMPREHENSIVE METABOLIC 30014     CREATININE           1.09 mg/dL  Unknown

 

          COMPREHENSIVE METABOLIC 85166     CALCIUM             9.3 mg/dL 06/10/

2019 Unknown

 

          COMPREHENSIVE METABOLIC 42294     POTASSIUM           4.7 mmol/L 06/10

/2019 Unknown

 

          COMPREHENSIVE METABOLIC 66318     Total Protein           6.3 g/dL  06

/10/2019 Unknown

 

          COMPREHENSIVE METABOLIC 44183     Glucose             84 mg/dL  06/10/

2019 Unknown

 

          COMPREHENSIVE METABOLIC 01437     Bicarbonate           25 mmol/L  Unknown

 

          COMPREHENSIVE METABOLIC 00830     AGAP                8 mmol/L  06/10/

2019 Unknown

 

          COMPLETE BLOOD COUNT [id number]   WBC                 7.8 10e9/L 06/10/20

19 Unknown

 

          COMPLETE BLOOD COUNT [id number]   RBC                 4.04 10e12/L 06/10/

2019 Unknown

 

          COMPLETE BLOOD COUNT [id number]   HEMOGLOBIN           12.2 g/dL 06/10/20

19 Unknown

 

          COMPLETE BLOOD COUNT [id number]   HEMATOCRIT           38.6 %    06/10/20

19 Unknown

 

          COMPLETE BLOOD COUNT [id number]   MCV                 95.5 fL   06/10/201

9 Unknown

 

          COMPLETE BLOOD COUNT [id number]   MCH                 30.2 pg   06/10/201

9 Unknown

 

          COMPLETE BLOOD COUNT [id number]   MCHC                31.6 g/dL 06/10/201

9 Unknown

 

          COMPLETE BLOOD COUNT [id number]   PLATELET COUNT           215 10e9/L 06/

10/2019 Unknown

 

          COMPLETE BLOOD COUNT [id number]   Mean Plt Volume           11.2 fL   06/

10/2019 Unknown

 

          COMPLETE BLOOD COUNT [id number]   Neut Auto           45.7 %    06/10/201

9 Unknown

 

          COMPLETE BLOOD COUNT [id number]   Lymph Auto           42.1 %    06/10/20

19 Unknown

 

          COMPLETE BLOOD COUNT [id number]   Mono Auto           9.2 %     06/10/201

9 Unknown

 

          COMPLETE BLOOD COUNT [id number]   RDW                 13.4 %    06/10/201

9 Unknown

 

          COMPLETE BLOOD COUNT [id number]   Eos Auto            2.7 %     06/10/201

9 Unknown

 

          COMPLETE BLOOD COUNT [id number]   Baso Auto           0.3 %     06/10/201

9 Unknown

 

          COMPLETE BLOOD COUNT [id number]   Neutrophil Abs           3.56 10e9/L 06

/10/2019 Unknown

 

          COMPLETE BLOOD COUNT [id number]   Lymphocyte Abs           3.28 10e9/L 06

/10/2019 Unknown

 

          COMPLETE BLOOD COUNT [id number]   Monocyte Abs           0.72 10e9/L  Unknown

 

          COMPLETE BLOOD COUNT [id number]   Eosinophil Abs           0.21 10e9/L 06

/10/2019 Unknown

 

          COMPLETE BLOOD COUNT [id number]   RDW-SD              44.9 fL   06/10/201

9 Unknown

 

          COMPLETE BLOOD COUNT [id number]   Basophil Abs           0.02 10e9/L  Unknown

 

          COMPLETE BLOOD COUNT [id number]   WBC                 5.2 10e9/L 20

18 Unknown

 

          COMPLETE BLOOD COUNT [id number]   RBC                 4.21 10e12/L 2018 Unknown

 

          COMPLETE BLOOD COUNT [id number]   HEMOGLOBIN           12.8 g/dL 20

18 Unknown

 

          COMPLETE BLOOD COUNT [id number]   HEMATOCRIT           39.0 %    20

18 Unknown

 

          COMPLETE BLOOD COUNT [id number]   MCV                 92.6 fL   

8 Unknown

 

          COMPLETE BLOOD COUNT [id number]   MCH                 30.4 pg   

8 Unknown

 

          COMPLETE BLOOD COUNT [id number]   MCHC                32.8 g/dL 

8 Unknown

 

          COMPLETE BLOOD COUNT [id number]   PLATELET COUNT           202 10e9/L  Unknown

 

          COMPLETE BLOOD COUNT [id number]   Mean Plt Volume           10.7 fL    Unknown

 

          COMPLETE BLOOD COUNT [id number]   Neut Auto           40.9 %    

8 Unknown

 

          COMPLETE BLOOD COUNT [id number]   Lymph Auto           46.3 %    20

18 Unknown

 

          COMPLETE BLOOD COUNT [id number]   Mono Auto           9.3 %     

8 Unknown

 

          COMPLETE BLOOD COUNT [id number]   RDW                 13.7 %    

8 Unknown

 

          COMPLETE BLOOD COUNT [id number]   Eos Auto            2.9 %     

8 Unknown

 

          COMPLETE BLOOD COUNT [id number]   Baso Auto           0.6 %     

8 Unknown

 

          COMPLETE BLOOD COUNT [id number]   Neutrophil Abs           2.13 10e9/L  Unknown

 

          COMPLETE BLOOD COUNT [id number]   Lymphocyte Abs           2.41 10e9/L  Unknown

 

          COMPLETE BLOOD COUNT [id number]   Monocyte Abs           0.48 10e9/L  Unknown

 

          COMPLETE BLOOD COUNT [id number]   Eosinophil Abs           0.15 10e9/L  Unknown

 

          COMPLETE BLOOD COUNT [id number]   RDW-SD              45.2 fL   

8 Unknown

 

          COMPLETE BLOOD COUNT [id number]   Basophil Abs           0.03 10e9/L  Unknown

 

          [place] [address]     Glucose             118 mg/dL  Unknown

 

          [place] [address]     CREATININE           1.01 mg/dL  Unknown

 

          [place] [address]     BUN                 14 mg/dL   Unknown

 

          [place] [address]     SODIUM              141 mmol/L  Unknown

 

          [place] [address]     POTASSIUM           4.0 mmol/L  Unknown

 

          [place] [address]     CHLORIDE            108 mmol/L  Unknown

 

          [place] [address]     Bicarbonate           25 mmol/L  Unknown

 

          [place] [address]     AGAP                8 mmol/L   Unknown

 

          [place] [address]     CALCIUM             9.6 mg/dL  Unknown

 

          FREE T4   04273     T4 Free             1.23 ng/dL 2018 Unknown

 

          GFR CALC  8972321   GFR Non Afr Amr           53 mL/min 2018 Unk

nown

 

          GFR CALC  4236560   GFR Afr Amr           >60 mL/min 2018 Unknow

n

 

          THYROID STIMULATING HORMONE 16020     TSH                 2.124 uIU/mL

 2018 Unknown

 

          LIPID GROUP 93344     Cholesterol           152 mg/dL 2018 Unkno

wn

 

          LIPID GROUP 32474     Triglyceride           151 mg/dL 2018 Unkn

own

 

          LIPID GROUP 60921     HDL CHOLESTEROL           47 mg/dL  2018 U

nknown

 

          LIPID GROUP 70976     Chol/HDL Ratio           3.23 ratio 2018 U

nknown

 

          LIPID GROUP 19605     NON-HDL Chol           105 mg/dL 2018 Unkn

own

 

          LIPID GROUP 09080     LDL Cholesterol           75 mg/dL  2018 U

nknown

 

          ASSAY OF TROPONIN QUANT 15189     Troponin-I           <0.30 ng/mL  Unknown

 

          COMPREHENSIVE METABOLIC 00695     AST                 20 U/L    2018 Unknown

 

          COMPREHENSIVE METABOLIC 79651     ALT                 14 U/L    2018 Unknown

 

          COMPREHENSIVE METABOLIC 08956     BUN                 19 mg/dL  2018 Unknown

 

          COMPREHENSIVE METABOLIC 28544     ALBUMIN             4.2 g/dL  2018 Unknown

 

          COMPREHENSIVE METABOLIC 36737     CHLORIDE            102 mmol/L  Unknown

 

          COMPREHENSIVE METABOLIC 29274     Bili Total           0.4 mg/dL  Unknown

 

          COMPREHENSIVE METABOLIC 58511     ALK PHOS            66 U/L    2018 Unknown

 

          COMPREHENSIVE METABOLIC 15559     SODIUM              135 mmol/L  Unknown

 

          COMPREHENSIVE METABOLIC 42606     CREATININE           1.01 mg/dL  Unknown

 

          COMPREHENSIVE METABOLIC 56979     CALCIUM             9.3 mg/dL 2018 Unknown

 

          COMPREHENSIVE METABOLIC 04006     POTASSIUM           4.8 mmol/L  Unknown

 

          COMPREHENSIVE METABOLIC 44166     Total Protein           7.0 g/dL   Unknown

 

          COMPREHENSIVE METABOLIC 42298     Glucose             91 mg/dL  2018 Unknown

 

          COMPREHENSIVE METABOLIC 84925     Bicarbonate           23 mmol/L  Unknown

 

          COMPREHENSIVE METABOLIC 70747     AGAP                10 mmol/L 2018 Unknown

 

          COMPLETE BLOOD COUNT [id number]   WBC                 7.5 10e9/L 20

18 Unknown

 

          COMPLETE BLOOD COUNT [id number]   RBC                 4.13 10e12/L 2018 Unknown

 

          COMPLETE BLOOD COUNT [id number]   HEMOGLOBIN           12.6 g/dL 20

18 Unknown

 

          COMPLETE BLOOD COUNT [id number]   HEMATOCRIT           38.4 %    20

18 Unknown

 

          COMPLETE BLOOD COUNT [id number]   MCV                 93.0 fL   

8 Unknown

 

          COMPLETE BLOOD COUNT [id number]   MCH                 30.5 pg   

8 Unknown

 

          COMPLETE BLOOD COUNT [id number]   MCHC                32.8 g/dL 

8 Unknown

 

          COMPLETE BLOOD COUNT [id number]   PLATELET COUNT           204 10e9/L  Unknown

 

          COMPLETE BLOOD COUNT [id number]   Mean Plt Volume           10.9 fL    Unknown

 

          COMPLETE BLOOD COUNT [id number]   Neut Auto           43.1 %    

8 Unknown

 

          COMPLETE BLOOD COUNT [id number]   Lymph Auto           45.0 %    20

18 Unknown

 

          COMPLETE BLOOD COUNT [id number]   Mono Auto           9.2 %     

8 Unknown

 

          COMPLETE BLOOD COUNT [id number]   RDW                 13.4 %    

8 Unknown

 

          COMPLETE BLOOD COUNT [id number]   Eos Auto            2.3 %     

8 Unknown

 

          COMPLETE BLOOD COUNT [id number]   Baso Auto           0.4 %     

8 Unknown

 

          COMPLETE BLOOD COUNT [id number]   Neutrophil Abs           3.23 10e9/L  Unknown

 

          COMPLETE BLOOD COUNT [id number]   Lymphocyte Abs           3.38 10e9/L  Unknown

 

          COMPLETE BLOOD COUNT [id number]   Monocyte Abs           0.69 10e9/L  Unknown

 

          COMPLETE BLOOD COUNT [id number]   Eosinophil Abs           0.17 10e9/L  Unknown

 

          COMPLETE BLOOD COUNT [id number]   RDW-SD              44.4 fL   

8 Unknown

 

          COMPLETE BLOOD COUNT [id number]   Basophil Abs           0.03 10e9/L  Unknown

 

          GFR CALC  0977852   GFR Non Afr Amr           53 mL/min 2018 Unk

nown

 

          GFR CALC  5907605   GFR Afr Amr           >60 mL/min 2018 Unknow

n

 

          GLYCOSYLATED HEMOGLOBIN TEST 42445     Hgb A1c   81668-7   5.4 %     0

2018 Unknown

 

          MEAN GLUC 9284212   Calc Mean Gluc           108 mg/dL 2018 Unkn

own

 

          MEAN GLUC 1825111   Calc Mean Gluc           114 mg/dL 2017 Unkn

own

 

          LIPID GROUP 08740     Cholesterol           146 mg/dL 2017 Unkno

wn

 

          LIPID GROUP 99709     Triglyceride           119 mg/dL 2017 Unkn

own

 

          LIPID GROUP 42681     HDL CHOLESTEROL           47 mg/dL  2017 U

nknown

 

          LIPID GROUP 12750     Chol/HDL Ratio           3.11 ratio 2017 U

nknown

 

          LIPID GROUP 28350     NON-HDL Chol           99 mg/dL  2017 Unkn

own

 

          LIPID GROUP 28314     LDL Cholesterol           75 mg/dL  2017 U

nknown

 

          GLYCOSYLATED HEMOGLOBIN TEST 34654     Hgb A1c   67759-3   5.6 %     0

2017 Unknown

 

          COMPREHENSIVE METABOLIC 94241     AST                 22 U/L    2017 Unknown

 

          COMPREHENSIVE METABOLIC 83206     ALT                 12 U/L    2017 Unknown

 

          COMPREHENSIVE METABOLIC 22394     BUN                 17 mg/dL  2017 Unknown

 

          COMPREHENSIVE METABOLIC 22434     ALBUMIN             4.0 g/dL  2017 Unknown

 

          COMPREHENSIVE METABOLIC 66682     CHLORIDE            110 mmol/L  Unknown

 

          COMPREHENSIVE METABOLIC 18964     Bili Total           0.4 mg/dL  Unknown

 

          COMPREHENSIVE METABOLIC 95789     ALK PHOS            63 U/L    2017 Unknown

 

          COMPREHENSIVE METABOLIC 54645     SODIUM              140 mmol/L  Unknown

 

          COMPREHENSIVE METABOLIC 52540     CREATININE           1.05 mg/dL  Unknown

 

          COMPREHENSIVE METABOLIC 63820     CALCIUM             9.2 mg/dL 2017 Unknown

 

          COMPREHENSIVE METABOLIC 32807     POTASSIUM           4.2 mmol/L  Unknown

 

          COMPREHENSIVE METABOLIC 97710     Total Protein           6.2 g/dL   Unknown

 

          COMPREHENSIVE METABOLIC 09356     Glucose             87 mg/dL  2017 Unknown

 

          COMPREHENSIVE METABOLIC 06648     Bicarbonate           24 mmol/L  Unknown

 

          COMPREHENSIVE METABOLIC 32201     AGAP                6 mmol/L  2017 Unknown

 

          GFR CALC  8790827   GFR Non Afr Amr           51 mL/min 2017 Unk

nown

 

          GFR CALC  7879631   GFR Afr Amr           >60 mL/min 2017 Unknow

n

 

          COMPLETE BLOOD COUNT [id number]   WBC                 6.7 10e9/L 20

17 Unknown

 

          COMPLETE BLOOD COUNT [id number]   RBC                 4.04 10e12/L 2017 Unknown

 

          COMPLETE BLOOD COUNT [id number]   HEMOGLOBIN           12.1 g/dL 20

17 Unknown

 

          COMPLETE BLOOD COUNT [id number]   HEMATOCRIT           38.0 %    20

17 Unknown

 

          COMPLETE BLOOD COUNT [id number]   MCV                 94.1 fL   

7 Unknown

 

          COMPLETE BLOOD COUNT [id number]   MCH                 30.0 pg   

7 Unknown

 

          COMPLETE BLOOD COUNT [id number]   MCHC                31.8 g/dL 

7 Unknown

 

          COMPLETE BLOOD COUNT [id number]   PLATELET COUNT           206 10e9/L  Unknown

 

          COMPLETE BLOOD COUNT [id number]   Mean Plt Volume           11.3 fL    Unknown

 

          COMPLETE BLOOD COUNT [id number]   Neut Auto           35.8 %    

7 Unknown

 

          COMPLETE BLOOD COUNT [id number]   Lymph Auto           51.6 %    20

17 Unknown

 

          COMPLETE BLOOD COUNT [id number]   Mono Auto           8.8 %     

7 Unknown

 

          COMPLETE BLOOD COUNT [id number]   RDW                 13.5 %    

7 Unknown

 

          COMPLETE BLOOD COUNT [id number]   Eos Auto            3.4 %     

7 Unknown

 

          COMPLETE BLOOD COUNT [id number]   Baso Auto           0.4 %     

7 Unknown

 

          COMPLETE BLOOD COUNT [id number]   Neutrophil Abs           2.40 10e9/L  Unknown

 

          COMPLETE BLOOD COUNT [id number]   Lymphocyte Abs           3.46 10e9/L  Unknown

 

          COMPLETE BLOOD COUNT [id number]   Monocyte Abs           0.59 10e9/L  Unknown

 

          COMPLETE BLOOD COUNT [id number]   Eosinophil Abs           0.23 10e9/L  Unknown

 

          COMPLETE BLOOD COUNT [id number]   RDW-SD              45.3 fL   

7 Unknown

 

          COMPLETE BLOOD COUNT [id number]   Basophil Abs           0.03 10e9/L  Unknown

 

          THYROID STIMULATING HORMONE 13536     TSH                 1.981 uIU/mL

 2017 Unknown

 

          COMPLETE BLOOD COUNT [id number]   WBC                 6.0 10e9/L 20

17 Unknown

 

          COMPLETE BLOOD COUNT [id number]   RBC                 4.29 10e12/L 2017 Unknown

 

          COMPLETE BLOOD COUNT [id number]   HEMOGLOBIN           12.9 g/dL 20

17 Unknown

 

          COMPLETE BLOOD COUNT [id number]   HEMATOCRIT           38.4 %    20

17 Unknown

 

          COMPLETE BLOOD COUNT [id number]   MCV                 89.5 fL   

7 Unknown

 

          COMPLETE BLOOD COUNT [id number]   MCH                 30.1 pg   

7 Unknown

 

          COMPLETE BLOOD COUNT [id number]   MCHC                33.6 g/dL 

7 Unknown

 

          COMPLETE BLOOD COUNT [id number]   PLATELET COUNT           181 10e9/L 2017 Unknown

 

          COMPLETE BLOOD COUNT [id number]   Mean Plt Volume           11.7 fL   2017 Unknown

 

          COMPLETE BLOOD COUNT [id number]   Neut Auto           36.9 %    

7 Unknown

 

          COMPLETE BLOOD COUNT [id number]   Lymph Auto           50.4 %    20

17 Unknown

 

          COMPLETE BLOOD COUNT [id number]   Mono Auto           9.0 %     

7 Unknown

 

          COMPLETE BLOOD COUNT [id number]   RDW                 13.7 %    

7 Unknown

 

          COMPLETE BLOOD COUNT [id number]   Eos Auto            3.4 %     

7 Unknown

 

          COMPLETE BLOOD COUNT [id number]   Baso Auto           0.3 %     

7 Unknown

 

          COMPLETE BLOOD COUNT [id number]   Neutrophil Abs           2.21 10e9/L  Unknown

 

          COMPLETE BLOOD COUNT [id number]   Lymphocyte Abs           3.02 10e9/L  Unknown

 

          COMPLETE BLOOD COUNT [id number]   Monocyte Abs           0.54 10e9/L 2017 Unknown

 

          COMPLETE BLOOD COUNT [id number]   Eosinophil Abs           0.20 10e9/L  Unknown

 

          COMPLETE BLOOD COUNT [id number]   RDW-SD              44.0 fL   

7 Unknown

 

          COMPLETE BLOOD COUNT [id number]   Basophil Abs           0.02 10e9/L 2017 Unknown

 

          GLYCOSYLATED HEMOGLOBIN TEST 87500     Hgb A1c   00086-3   5.4 %     0

3/09/2017 Unknown

 

          THYROID STIMULATING HORMONE 02598     TSH                 2.200 uIU/mL

 2017 Unknown

 

          GFR CALC  0201800   GFR Non Afr Amr           50 mL/min 2017 Unk

nown

 

          GFR CALC  0169454   GFR Afr Amr           >60 mL/min 2017 Unknow

n

 

          MEAN GLUC 5383400   Calc Mean Gluc           108 mg/dL 2017 Unkn

own

 

          COMPREHENSIVE METABOLIC 00419     AST                 18 U/L    2017 Unknown

 

          COMPREHENSIVE METABOLIC 16314     ALT                 10 U/L    2017 Unknown

 

          COMPREHENSIVE METABOLIC 24710     BUN                 20 mg/dL  2017 Unknown

 

          COMPREHENSIVE METABOLIC 75291     ALBUMIN             4.1 g/dL  2017 Unknown

 

          COMPREHENSIVE METABOLIC 98119     CHLORIDE            109 mmol/L  Unknown

 

          COMPREHENSIVE METABOLIC 64418     Bili Total           0.6 mg/dL  Unknown

 

          COMPREHENSIVE METABOLIC 49965     ALK PHOS            64 U/L    2017 Unknown

 

          COMPREHENSIVE METABOLIC 15945     SODIUM              141 mmol/L  Unknown

 

          COMPREHENSIVE METABOLIC 39388     CREATININE           1.06 mg/dL 2017 Unknown

 

          COMPREHENSIVE METABOLIC 46352     CALCIUM             9.9 mg/dL 2017 Unknown

 

          COMPREHENSIVE METABOLIC 40199     POTASSIUM           4.2 mmol/L  Unknown

 

          COMPREHENSIVE METABOLIC 91799     Total Protein           6.3 g/dL   Unknown

 

          COMPREHENSIVE METABOLIC 70408     Glucose             99 mg/dL  2017 Unknown

 

          COMPREHENSIVE METABOLIC 40996     Bicarbonate           21 mmol/L 2017 Unknown

 

          COMPREHENSIVE METABOLIC 75388     AGAP                11 mmol/L 2017 Unknown

 

          LIPID GROUP [id number]     Cholesterol           169 mg/dL 2016 Unkno

wn

 

          LIPID GROUP [id number]     Triglyceride           165 mg/dL 2016 Unkn

own

 

          LIPID GROUP [id number]     HDL CHOLESTEROL           43 mg/dL  2016 U

nknown

 

          LIPID GROUP [id number]     Chol/HDL Ratio           3.93 ratio 2016 U

nknown

 

          LIPID GROUP 19503     NON-HDL Chol           126 mg/dL 2016 Unkn

own

 

          LIPID GROUP [id number]     LDL Cholesterol           93 mg/dL  2016 U

nknown

 

          COMPREHENSIVE METABOLIC 34185     AST                 18 U/L    2016 Unknown

 

          COMPREHENSIVE METABOLIC 34167     ALT                 10 U/L    2016 Unknown

 

          COMPREHENSIVE METABOLIC 19701     BUN                 20 mg/dL  2016 Unknown

 

          COMPREHENSIVE METABOLIC 99159     ALBUMIN             3.9 g/dL  2016 Unknown

 

          COMPREHENSIVE METABOLIC 93488     CHLORIDE            110 mmol/L  Unknown

 

          COMPREHENSIVE METABOLIC 24615     Bili Total           0.5 mg/dL  Unknown

 

          COMPREHENSIVE METABOLIC 77650     ALK PHOS            72 U/L    2016 Unknown

 

          COMPREHENSIVE METABOLIC 50822     SODIUM              141 mmol/L  Unknown

 

          COMPREHENSIVE METABOLIC 61440     CREATININE           1.12 mg/dL  Unknown

 

          COMPREHENSIVE METABOLIC 71046     CALCIUM             9.7 mg/dL 2016 Unknown

 

          COMPREHENSIVE METABOLIC 88486     POTASSIUM           4.4 mmol/L  Unknown

 

          COMPREHENSIVE METABOLIC 40326     Total Protein           6.2 g/dL   Unknown

 

          COMPREHENSIVE METABOLIC 54737     Glucose             90 mg/dL  2016 Unknown

 

          COMPREHENSIVE METABOLIC 04842     Bicarbonate           23 mmol/L  Unknown

 

          COMPREHENSIVE METABOLIC 31640     AGAP                8 mmol/L  2016 Unknown

 

          GFR CALC  5618969   GFR Non Afr Amr           47 mL/min 2016 Unk

nown

 

          GFR CALC  3660058   GFR Afr Amr           57 mL/min 2016 Unknown

 

          GLYCOSYLATED HEMOGLOBIN TEST 97640     Hgb A1c   95727-6   5.5 %     0

2016 Unknown

 

          THYROID STIMULATING HORMONE 90404     TSH                 2.537 uIU/mL

 2016 Unknown

 

          FREE T4   15164     T4 Free             1.36 ng/dL 2016 Unknown

 

          COMPLETE BLOOD COUNT [id number]   WBC                 6.8 10e9/L 20

16 Unknown

 

          COMPLETE BLOOD COUNT 7421130   RBC                 4.20 10e12/L 2016 Unknown

 

          COMPLETE BLOOD COUNT [id number]   HEMOGLOBIN           12.5 g/dL 20

16 Unknown

 

          COMPLETE BLOOD COUNT 7961822   HEMATOCRIT           38.0 %    20

16 Unknown

 

          COMPLETE BLOOD COUNT [id number]   MCV                 90.5 fL   

6 Unknown

 

          COMPLETE BLOOD COUNT [id number]   MCH                 29.8 pg   

6 Unknown

 

          COMPLETE BLOOD COUNT [id number]   MCHC                32.9 g/dL 

6 Unknown

 

          COMPLETE BLOOD COUNT 7746562   PLATELET COUNT           197 10e9/L  Unknown

 

          COMPLETE BLOOD COUNT 2381070   Mean Plt Volume           11.7 fL    Unknown

 

          COMPLETE BLOOD COUNT 9481315   Neut Auto           41.3 %    

6 Unknown

 

          COMPLETE BLOOD COUNT [id number]   Lymph Auto           47.1 %    20

16 Unknown

 

          COMPLETE BLOOD COUNT [id number]   Mono Auto           7.8 %     

6 Unknown

 

          COMPLETE BLOOD COUNT 8573409   RDW                 13.8 %    

6 Unknown

 

          COMPLETE BLOOD COUNT [id number]   Eos Auto            3.4 %     

6 Unknown

 

          COMPLETE BLOOD COUNT [id number]   Baso Auto           0.4 %     

6 Unknown

 

          COMPLETE BLOOD COUNT [id number]   Neutrophil Abs           2.81 10e9/L  Unknown

 

          COMPLETE BLOOD COUNT 7208572   Lymphocyte Abs           3.20 10e9/L  Unknown

 

          COMPLETE BLOOD COUNT [id number]   Monocyte Abs           0.53 10e9/L  Unknown

 

          COMPLETE BLOOD COUNT [id number]   Eosinophil Abs           0.23 10e9/L  Unknown

 

          COMPLETE BLOOD COUNT [id number]   RDW-SD              44.4 fL   

6 Unknown

 

          COMPLETE BLOOD COUNT [id number]   Basophil Abs           0.03 10e9/L  Unknown

 

          MEAN GLUC 7183955   Calc Mean Gluc           111 mg/dL 2016 Unkn

own

 

          [place] [address]     Glucose             89 MG/DL   Unknown

 

          [place] [address]     CREATININE           1.12 MG/DL  Unknown

 

          [place] [address]     BUN                 20 MG/DL   Unknown

 

          [place] [address]     SODIUM              139 MMOL/L  Unknown

 

          [place] [address]     POTASSIUM           4.6 MMOL/L  Unknown

 

          [place] [address]     CHLORIDE            108 MMOL/L  Unknown

 

          [place] [address]     BICARB              26 MMOL/L  Unknown

 

          [place] [address]     ANION GAP           5 MEQ/L    Unknown

 

          [place] [address]     CALCIUM             10.0 MG/DL  Unknown

 

          GFR CALC  0372980   GFR AA              57.0L ML/MIN 2015 Unknow

n

 

          GFR CALC  2005192   GFR NON-AA           47.0L ML/MIN 2015 Unkno

wn

 

          THYROID STIMULATING HORMONE 59232     TSH                 2.378 uIU/ML

 09/10/2015 Unknown

 

          COMPLETE BLOOD COUNT 0076509   WBC                 6.4 10e9/L 09/10/20

15 Unknown

 

          COMPLETE BLOOD COUNT [id number]   RBC                 3.99 10e12/L 09/10/

2015 Unknown

 

          COMPLETE BLOOD COUNT [id number]   HGB                 11.9 g/dL 09/10/201

5 Unknown

 

          COMPLETE BLOOD COUNT [id number]   HCT DET             36.9 %    09/10/201

5 Unknown

 

          COMPLETE BLOOD COUNT [id number]   MCV                 92.5 fL   09/10/201

5 Unknown

 

          COMPLETE BLOOD COUNT [id number]   MCH                 29.8 pg   09/10/201

5 Unknown

 

          COMPLETE BLOOD COUNT [id number]   MCHC                32.2 g/dL 09/10/201

5 Unknown

 

          COMPLETE BLOOD COUNT [id number]   PLT                 172 10e9/L 09/10/20

15 Unknown

 

          COMPLETE BLOOD COUNT [id number]   MPV                 11.7 fL   09/10/201

5 Unknown

 

          COMPLETE BLOOD COUNT [id number]   ARIK %               40.4 %    09/10/201

5 Unknown

 

          COMPLETE BLOOD COUNT [id number]   LY %                48.0 %    09/10/201

5 Unknown

 

          COMPLETE BLOOD COUNT [id number]   MON %               8.3 %     09/10/201

5 Unknown

 

          COMPLETE BLOOD COUNT [id number]   EOS  %              2.8 %     09/10/201

5 Unknown

 

          COMPLETE BLOOD COUNT [id number]   BASO %              0.5 %     09/10/201

5 Unknown

 

          COMPLETE BLOOD COUNT [id number]   RDW                 13.6 %    09/10/201

5 Unknown

 

          COMPLETE BLOOD COUNT [id number]   ABS ARIK             2.59 10e9/L 09/10/2

015 Unknown

 

          COMPLETE BLOOD COUNT [id number]   ABS LYMPH           3.07 10e9/L 09/10/2

015 Unknown

 

          COMPLETE BLOOD COUNT [id number]   ABS MONO            0.53 10e9/L 09/10/2

015 Unknown

 

          COMPLETE BLOOD COUNT [id number]   ABS EOS             0.18 10e9/L 09/10/2

015 Unknown

 

          COMPLETE BLOOD COUNT [id number]   ABS BASO            0.03 10e9/L 09/10/2

015 Unknown

 

          COMPLETE BLOOD COUNT [id number]   RDW-SD              44.9 fL   09/10/201

5 Unknown

 

          LIPID GROUP [id number]     HDL TEST            42 MG/DL  09/10/2015 Unknown

 

          LIPID GROUP [id number]     TRIG                177 MG/DL 09/10/2015 Unknown

 

          LIPID GROUP [id number]     TEST LDL            72 MG/DL  09/10/2015 Unknown

 

          LIPID GROUP 63574     CHOL                149 MG/DL 09/10/2015 Unknown

 

          LIPID GROUP [id number]     RCHOL/HDL           3.55 RATIO 09/10/2015 Unknow

n

 

          LIPID GROUP 44415     NON-HDL CH           107 MG/DL 09/10/2015 Unknow

n

 

          GLYCOSYLATED HEMOGLOBIN TEST 37117     A1C HPLC  49441-5   5.5 %     0

9/10/2015 Unknown

 

          FREE T4   21914     FREE T4             1.39 NG/DL 09/10/2015 Unknown

 

          GFR CALC  0162975   GFR AA              55.0L ML/MIN 09/10/2015 Unknow

n

 

          GFR CALC  8593396   GFR NON-AA           46.0L ML/MIN 09/10/2015 Unkno

wn

 

          COMPREHENSIVE METABOLIC 42642     AST                 17 U/L    09/10/

2015 Unknown

 

          COMPREHENSIVE METABOLIC 31339     ALT                 10 IU/L   09/10/

2015 Unknown

 

          COMPREHENSIVE METABOLIC 07942     BUN                 20 MG/DL  09/10/

2015 Unknown

 

          COMPREHENSIVE METABOLIC 09118     ALBUMIN             3.9 GM/DL 09/10/

2015 Unknown

 

          COMPREHENSIVE METABOLIC 24444     CHLORIDE            111 MMOL/L 09/10

/2015 Unknown

 

          COMPREHENSIVE METABOLIC 13158     BILI TOT            0.4 MG/DL 09/10/

2015 Unknown

 

          COMPREHENSIVE METABOLIC 57789     ALK PHOS            70 U/L    09/10/

2015 Unknown

 

          COMPREHENSIVE METABOLIC 83742     SODIUM              142 MMOL/L 09/10

/2015 Unknown

 

          COMPREHENSIVE METABOLIC 65882     CREATININE           1.16 MG/DL  Unknown

 

          COMPREHENSIVE METABOLIC 72794     CALCIUM             9.4 MG/DL 09/10/

2015 Unknown

 

          COMPREHENSIVE METABOLIC 27633     POTASSIUM           4.6 MMOL/L 09/10

/2015 Unknown

 

          COMPREHENSIVE METABOLIC 66637     PROT TOT            6.2 GM/DL 09/10/

2015 Unknown

 

          COMPREHENSIVE METABOLIC 39395     Glucose             90 MG/DL  09/10/

2015 Unknown

 

          COMPREHENSIVE METABOLIC 97619     BICARB              24 MMOL/L 09/10/

2015 Unknown

 

          COMPREHENSIVE METABOLIC 19422     ANION GAP           7 MEQ/L   09/10/

2015 Unknown

 

          THYROID STIMULATING HORMONE 12225     TSH                 2.427 uIU/ML

 2015 Unknown

 

          LIPID GROUP [id number]     HDL TEST            47 MG/DL  2015 Unknown

 

          LIPID GROUP [id number]     TRIG                145 MG/DL 2015 Unknown

 

          LIPID GROUP 95739     TEST LDL            73 MG/DL  2015 Unknown

 

          LIPID GROUP 09520     CHOL                149 MG/DL 2015 Unknown

 

          LIPID GROUP 61651     RCHOL/HDL           3.17 RATIO 2015 Unknow

n

 

          LIPID GROUP 08532     NON-HDL CH           102 MG/DL 2015 Unknow

n

 

          COMPREHENSIVE METABOLIC 11804     AST                 17 U/L    2015 Unknown

 

          COMPREHENSIVE METABOLIC 86054     ALT                 9 IU/L    2015 Unknown

 

          COMPREHENSIVE METABOLIC 05154     BUN                 19 MG/DL  2015 Unknown

 

          COMPREHENSIVE METABOLIC 52099     ALBUMIN             4.3 GM/DL 2015 Unknown

 

          COMPREHENSIVE METABOLIC 80186     CHLORIDE            108 MMOL/L  Unknown

 

          COMPREHENSIVE METABOLIC 24543     BILI TOT            0.5 MG/DL 2015 Unknown

 

          COMPREHENSIVE METABOLIC 97988     ALK PHOS            68 U/L    2015 Unknown

 

          COMPREHENSIVE METABOLIC 82586     SODIUM              140 MMOL/L  Unknown

 

          COMPREHENSIVE METABOLIC 56793     CREATININE           1.08 MG/DL 2015 Unknown

 

          COMPREHENSIVE METABOLIC 11939     CALCIUM             9.9 MG/DL 2015 Unknown

 

          COMPREHENSIVE METABOLIC 69241     POTASSIUM           4.3 MMOL/L  Unknown

 

          COMPREHENSIVE METABOLIC 38615     PROT TOT            7.2 GM/DL 2015 Unknown

 

          COMPREHENSIVE METABOLIC 12793     Glucose             94 MG/DL  2015 Unknown

 

          COMPREHENSIVE METABOLIC 23389     BICARB              26 MMOL/L 2015 Unknown

 

          COMPREHENSIVE METABOLIC 75936     ANION GAP           6 MEQ/L   2015 Unknown

 

          GFR CALC  8390946   GFR AA              60.0L ML/MIN 2015 Unknow

n

 

          GFR CALC  4737429   GFR NON-AA           49.0L ML/MIN 2015 Unkno

wn

 

          GLYCOSYLATED HEMOGLOBIN TEST 90880     A1C HPLC  56472-4   5.6 %     0

3/06/2015 Unknown

 

          COMPLETE BLOOD COUNT 2575223   WBC                 7.2 10e9/L 20

15 Unknown

 

          COMPLETE BLOOD COUNT [id number]   RBC                 4.28 10e12/L 2015 Unknown

 

          COMPLETE BLOOD COUNT 5143564   HGB                 12.8 g/dL 

5 Unknown

 

          COMPLETE BLOOD COUNT [id number]   HCT DET             39.3 %    

5 Unknown

 

          COMPLETE BLOOD COUNT 6208545   MCV                 91.8 fL   

5 Unknown

 

          COMPLETE BLOOD COUNT 7755640   MCH                 29.9 pg   

5 Unknown

 

          COMPLETE BLOOD COUNT 9537423   MCHC                32.6 g/dL 

5 Unknown

 

          COMPLETE BLOOD COUNT [id number]   PLT                 189 10e9/L 20

15 Unknown

 

          COMPLETE BLOOD COUNT [id number]   MPV                 11.2 fL   

5 Unknown

 

          COMPLETE BLOOD COUNT [id number]   ARIK %               38.0 %    

5 Unknown

 

          COMPLETE BLOOD COUNT 2195044   LY %                51.0 %    

5 Unknown

 

          COMPLETE BLOOD COUNT 3367586   MON %               7.7 %     

5 Unknown

 

          COMPLETE BLOOD COUNT [id number]   EOS  %              2.9 %     

5 Unknown

 

          COMPLETE BLOOD COUNT [id number]   BASO %              0.4 %     

5 Unknown

 

          COMPLETE BLOOD COUNT [id number]   RDW                 14.0 %    

5 Unknown

 

          COMPLETE BLOOD COUNT [id number]   ABS ARIK             2.74 10e9/L 

015 Unknown

 

          COMPLETE BLOOD COUNT 9756111   ABS LYMPH           3.67 10e9/L 

015 Unknown

 

          COMPLETE BLOOD COUNT 0431829   ABS MONO            0.55 10e9/L 

015 Unknown

 

          COMPLETE BLOOD COUNT 3113546   ABS EOS             0.21 10e9/L 

015 Unknown

 

          COMPLETE BLOOD COUNT 5789040   ABS BASO            0.03 10e9/L 

015 Unknown

 

          COMPLETE BLOOD COUNT [id number]   RDW-SD              46.1 fL   

5 Unknown

 

          FREE T4   07259     FREE T4             1.14 NG/DL 2015 Unknown

 

          GLYCOSYLATED HEMOGLOBIN TEST 19666     A1C HPLC  27262-5   5.2 %     0

2014 Unknown

 

          FREE T4   22335     FREE T4             1.40 NG/DL 2014 Unknown

 

          GFR CALC  7897075   GFR AA              >60 ML/MIN 2014 Unknown

 

          GFR CALC  4619306   GFR NON-AA           52.0L ML/MIN 2014 Unkno

wn

 

          COMPREHENSIVE METABOLIC 25781     AST                 15 U/L    2014 Unknown

 

          COMPREHENSIVE METABOLIC 54845     ALT                 9 IU/L    2014 Unknown

 

          COMPREHENSIVE METABOLIC 00436     BUN                 17 MG/DL  2014 Unknown

 

          COMPREHENSIVE METABOLIC 52590     ALBUMIN             4.0 GM/DL 2014 Unknown

 

          COMPREHENSIVE METABOLIC 72058     CHLORIDE            112 MMOL/L  Unknown

 

          COMPREHENSIVE METABOLIC 81092     BILI TOT            0.5 MG/DL 2014 Unknown

 

          COMPREHENSIVE METABOLIC 79913     ALK PHOS            66 U/L    2014 Unknown

 

          COMPREHENSIVE METABOLIC 06727     SODIUM              140 MMOL/L  Unknown

 

          COMPREHENSIVE METABOLIC 85872     CREATININE           1.03 MG/DL  Unknown

 

          COMPREHENSIVE METABOLIC 37391     CALCIUM             9.5 MG/DL 2014 Unknown

 

          COMPREHENSIVE METABOLIC 29708     POTASSIUM           4.1 MMOL/L  Unknown

 

          COMPREHENSIVE METABOLIC 73660     PROT TOT            6.2 GM/DL 2014 Unknown

 

          COMPREHENSIVE METABOLIC 51700     Glucose             102 MG/DL 2014 Unknown

 

          COMPREHENSIVE METABOLIC 82911     BICARB              23 MMOL/L 2014 Unknown

 

          COMPREHENSIVE METABOLIC 21397     ANION GAP           5 MEQ/L   2014 Unknown

 

          THYROID STIMULATING HORMONE 88858     TSH                 2.074 uIU/ML

 2014 Unknown

 

          VITAMIN B 12 FOLIC ACID [id number] VIT B 12            423 PG/ML  Unknown

 

          VITAMIN B 12 FOLIC ACID [id number] FOLIC ACID           19.7 NG/ML  Unknown

 

          LIPID GROUP 57824     HDL TEST            40 MG/DL  2014 Unknown

 

          LIPID GROUP 38562     TRIG                145 MG/DL 2014 Unknown

 

          LIPID GROUP 10840     TEST LDL            81 MG/DL  2014 Unknown

 

          LIPID GROUP 07319     CHOL                150 MG/DL 2014 Unknown

 

          LIPID GROUP [id number]     RCHOL/HDL           3.75 RATIO 2014 Unknow

n

 

          COMPLETE BLOOD COUNT 8852237   WBC                 6.0 10e9/L 20

14 Unknown

 

          COMPLETE BLOOD COUNT 8641486   RBC                 4.26 10e12/L 2014 Unknown

 

          COMPLETE BLOOD COUNT 3438909   HGB                 12.7 g/dL 

4 Unknown

 

          COMPLETE BLOOD COUNT [id number]   HCT DET             38.7 %    

4 Unknown

 

          COMPLETE BLOOD COUNT [id number]   MCV                 90.8 fL   

4 Unknown

 

          COMPLETE BLOOD COUNT 3364496   MCH                 29.8 pg   

4 Unknown

 

          COMPLETE BLOOD COUNT 9604111   MCHC                32.8 g/dL 

4 Unknown

 

          COMPLETE BLOOD COUNT 7278436   PLT                 178 10e9/L 20

14 Unknown

 

          COMPLETE BLOOD COUNT [id number]   MPV                 11.7 fL   

4 Unknown

 

          COMPLETE BLOOD COUNT 9558615   ARIK %               30.5 %    

4 Unknown

 

          COMPLETE BLOOD COUNT 1476725   LY %                55.4 %    

4 Unknown

 

          COMPLETE BLOOD COUNT 6903157   MON %               9.0 %     

4 Unknown

 

          COMPLETE BLOOD COUNT 9702388   EOS  %              4.4 %     

4 Unknown

 

          COMPLETE BLOOD COUNT 2599501   BASO %              0.7 %     

4 Unknown

 

          COMPLETE BLOOD COUNT 7582556   RDW                 13.3 %    

4 Unknown

 

          COMPLETE BLOOD COUNT 8171146   ABS ARIK             1.83 10e9/L 

014 Unknown

 

          COMPLETE BLOOD COUNT [id number]   ABS LYMPH           3.32 10e9/L 

014 Unknown

 

          COMPLETE BLOOD COUNT [id number]   ABS MONO            0.54 10e9/L 

014 Unknown

 

          COMPLETE BLOOD COUNT [id number]   ABS EOS             0.26 10e9/L 

014 Unknown

 

          COMPLETE BLOOD COUNT [id number]   ABS BASO            0.04 10e9/L 

014 Unknown

 

          COMPLETE BLOOD COUNT [id number]   RDW-SD              43.2 fL   

4 Unknown

 

          HEMOGLOBIN A1C (GLYCOSYLATED) 4892536   A1C [facility]  84207-2   5.5 %     

2012 Unknown

 

          COMPLETE BLOOD COUNT [id number]   WBC                 6.0 10e9/L 20

12 Unknown

 

          COMPLETE BLOOD COUNT 7271351   RBC                 4.22 10e12/L 2012 Unknown

 

          COMPLETE BLOOD COUNT 1764571   HGB                 12.4 g/dL 

2 Unknown

 

          COMPLETE BLOOD COUNT [id number]   HCT DET             38.2 %    

2 Unknown

 

          COMPLETE BLOOD COUNT [id number]   MCV                 90.5 fL   

2 Unknown

 

          COMPLETE BLOOD COUNT [id number]   MCH                 29.4 pg   

2 Unknown

 

          COMPLETE BLOOD COUNT 9361627   MCHC                32.5 g/dL 

2 Unknown

 

          COMPLETE BLOOD COUNT [id number]   PLT                 187 10e9/L 20

12 Unknown

 

          COMPLETE BLOOD COUNT [id number]   MPV                 11.5 fL   

2 Unknown

 

          COMPLETE BLOOD COUNT [id number]   ARIK %               36.4 %    

2 Unknown

 

          COMPLETE BLOOD COUNT 7879332   LY %                51.0 %    

2 Unknown

 

          COMPLETE BLOOD COUNT [id number]   MON %               8.7 %     

2 Unknown

 

          COMPLETE BLOOD COUNT 4047048   EOS  %              3.2 %     

2 Unknown

 

          COMPLETE BLOOD COUNT [id number]   BASO %              0.7 %     

2 Unknown

 

          COMPLETE BLOOD COUNT 8806580   RDW                 13.7 %    

2 Unknown

 

          COMPLETE BLOOD COUNT [id number]   ABS ARIK             2.18 10e9/L 

012 Unknown

 

          COMPLETE BLOOD COUNT [id number]   ABS LYMPH           3.06 10e9/L 

012 Unknown

 

          COMPLETE BLOOD COUNT [id number]   ABS MONO            0.52 10e9/L 

012 Unknown

 

          COMPLETE BLOOD COUNT [id number]   ABS EOS             0.19 10e9/L 

012 Unknown

 

          COMPLETE BLOOD COUNT [id number]   ABS BASO            0.04 10e9/L 

012 Unknown

 

          COMPLETE BLOOD COUNT [id number]   RDW-SD              44.3 fL   

2 Unknown

 

          LIPID GROUP 92961     HDL TEST            42 MG/DL  2012 Unknown

 

          LIPID GROUP [id number]     TRIG                156 MG/DL 2012 Unknown

 

          LIPID GROUP 11536     TEST LDL            80 MG/DL  2012 Unknown

 

          LIPID GROUP [id number]     CHOL                153 MG/DL 2012 Unknown

 

          LIPID GROUP [id number]     RCHOL/HDL           3.64 RATIO 2012 Unknow

n

 

          FREE T4   72898     FREE T4             1.22 NG/DL 2012 Unknown

 

          COMPREHENSIVE METABOLIC 50969     AST                 20 U/L    2012 Unknown

 

          COMPREHENSIVE METABOLIC 84604     ALT                 11 IU/L   2012 Unknown

 

          COMPREHENSIVE METABOLIC 73449     BUN                 19 MG/DL  2012 Unknown

 

          COMPREHENSIVE METABOLIC 20810     ALBUMIN             4.3 GM/DL 2012 Unknown

 

          COMPREHENSIVE METABOLIC 31342     CHLORIDE            109 MMOL/L  Unknown

 

          COMPREHENSIVE METABOLIC 68211     BILI TOT            0.6 MG/DL 2012 Unknown

 

          COMPREHENSIVE METABOLIC 67825     ALK PHOS            84 U/L    2012 Unknown

 

          COMPREHENSIVE METABOLIC 89053     SODIUM              142 MMOL/L  Unknown

 

          COMPREHENSIVE METABOLIC 62512     CREATININE           1.09 MG/DL  Unknown

 

          COMPREHENSIVE METABOLIC 53136     CALCIUM             9.8 MG/DL 2012 Unknown

 

          COMPREHENSIVE METABOLIC 15811     POTASSIUM           4.2 MMOL/L  Unknown

 

          COMPREHENSIVE METABOLIC 64910     PROT TOT            6.4 GM/DL 2012 Unknown

 

          COMPREHENSIVE METABOLIC 91547     Glucose             89 MG/DL  2012 Unknown

 

          COMPREHENSIVE METABOLIC 37626     BICARB              25 MMOL/L 2012 Unknown

 

          COMPREHENSIVE METABOLIC 46611     ANION GAP           8 MEQ/L   2012 Unknown

 

          GFR CALC  6009196   GFR AA              60.0L ML/MIN 2012 Unknow

n

 

          GFR CALC  5417113   GFR NON-AA           49.0L ML/MIN 2012 Unkno

wn

 

          THYROID STIMULATING HORMONE 26182     TSH                 2.450 uIU/ML

 2012 Unknown

 

          COMPREHENSIVE METABOLIC 84910     AST                 22 U/L    2012 Unknown

 

          COMPREHENSIVE METABOLIC 26272     ALT                 14 IU/L   2012 Unknown

 

          COMPREHENSIVE METABOLIC 52752     BUN                 21 MG/DL  2012 Unknown

 

          COMPREHENSIVE METABOLIC 14152     ALBUMIN             4.3 GM/DL 2012 Unknown

 

          COMPREHENSIVE METABOLIC 30311     CHLORIDE            106 MMOL/L  Unknown

 

          COMPREHENSIVE METABOLIC 74634     BILI TOT            0.4 MG/DL 2012 Unknown

 

          COMPREHENSIVE METABOLIC 22273     ALK PHOS            80 U/L    2012 Unknown

 

          COMPREHENSIVE METABOLIC 60022     SODIUM              141 MMOL/L  Unknown

 

          COMPREHENSIVE METABOLIC 70210     CREATININE           1.13 MG/DL  Unknown

 

          COMPREHENSIVE METABOLIC 45017     CALCIUM             9.4 MG/DL 2012 Unknown

 

          COMPREHENSIVE METABOLIC 11975     POTASSIUM           4.3 MMOL/L  Unknown

 

          COMPREHENSIVE METABOLIC 19732     PROT TOT            6.7 GM/DL 2012 Unknown

 

          COMPREHENSIVE METABOLIC 45934     Glucose             98 MG/DL  2012 Unknown

 

          COMPREHENSIVE METABOLIC 07246     BICARB              25 MMOL/L 2012 Unknown

 

          COMPREHENSIVE METABOLIC 69995     ANION GAP           10 MEQ/L  2012 Unknown

 

          LIPID GROUP 88404     HDL TEST            44 MG/DL  2012 Unknown

 

          LIPID GROUP 03530     TRIG                164 MG/DL 2012 Unknown

 

          LIPID GROUP 67046     TEST LDL            98 MG/DL  2012 Unknown

 

          LIPID GROUP 62961     CHOL                175 MG/DL 2012 Unknown

 

          LIPID GROUP 14027     RCHOL/HDL           3.98 RATIO 2012 Unknow

n

 

          COMPLETE BLOOD COUNT 50529     WBC                 6.7 10e9/L 20

12 Unknown

 

          COMPLETE BLOOD COUNT 77383     RBC                 4.36 10e12/L 2012 Unknown

 

          COMPLETE BLOOD COUNT 69250     HGB                 12.9 g/dL 

2 Unknown

 

          COMPLETE BLOOD COUNT 54055     HCT DET             39.4 %    

2 Unknown

 

          COMPLETE BLOOD COUNT 33622     MCV                 90.4 fL   

2 Unknown

 

          COMPLETE BLOOD COUNT 72900     MCH                 29.6 pg   

2 Unknown

 

          COMPLETE BLOOD COUNT 24258     MCHC                32.7 g/dL 

2 Unknown

 

          COMPLETE BLOOD COUNT 99852     PLT                 184 10e9/L 20

12 Unknown

 

          COMPLETE BLOOD COUNT 35256     MPV                 10.9 fL   

2 Unknown

 

          COMPLETE BLOOD COUNT 96454     ARIK %               41.5 %    

2 Unknown

 

          COMPLETE BLOOD COUNT 79897     LY %                45.7 %    

2 Unknown

 

          COMPLETE BLOOD COUNT 90302     MON %               9.4 %     

2 Unknown

 

          COMPLETE BLOOD COUNT 41136     EOS  %              3.0 %     

2 Unknown

 

          COMPLETE BLOOD COUNT 97665     BASO %              0.4 %     

2 Unknown

 

          COMPLETE BLOOD COUNT 81625     RDW                 13.2 %    

2 Unknown

 

          COMPLETE BLOOD COUNT 52743     ABS ARIK             2.78 10e9/L 

012 Unknown

 

          COMPLETE BLOOD COUNT 38803     ABS LYMPH           3.06 10e9/L 

012 Unknown

 

          COMPLETE BLOOD COUNT 74918     ABS MONO            0.63 10e9/L 

012 Unknown

 

          COMPLETE BLOOD COUNT 53367     ABS EOS             0.20 10e9/L 

012 Unknown

 

          COMPLETE BLOOD COUNT 47861     ABS BASO            0.03 10e9/L 

012 Unknown

 

          COMPLETE BLOOD COUNT 17603     RDW-SD              42.3 fL   

2 Unknown

 

          GFR CALC  7418689   GFR AA              57.0L ML/MIN 2012 Unknow

n

 

          GFR CALC  5466006   GFR NON-AA           47.0L ML/MIN 2012 Unkno

wn

 

          THYROID STIMULATING HORMONE 59873     TSH                 2.663 uIU/ML

 2012 Unknown

 

          FREE T4   67045     FREE T4             1.15 NG/DL 2012 Unknown

 

          THYROID STIMULATING HORMONE 79639     TSH                 1.908 uIU/ML

 2011 Unknown

 

          COMPLETE BLOOD COUNT 48671     WBC                 6.4 10e9/L 20

11 Unknown

 

          COMPLETE BLOOD COUNT 26509     RBC                 3.92 10e12/L 2011 Unknown

 

          COMPLETE BLOOD COUNT 13431     HGB                 11.8 g/dL 

1 Unknown

 

          COMPLETE BLOOD COUNT 16892     HCT DET             36.0 %    

1 Unknown

 

          COMPLETE BLOOD COUNT 60780     MCV                 91.8 fL   

1 Unknown

 

          COMPLETE BLOOD COUNT 12069     MCH                 30.1 pg   

1 Unknown

 

          COMPLETE BLOOD COUNT 56839     MCHC                32.8 g/dL 

1 Unknown

 

          COMPLETE BLOOD COUNT 52976     PLT                 176 10e9/L 20

11 Unknown

 

          COMPLETE BLOOD COUNT 86920     MPV                 11.4 fL   

1 Unknown

 

          COMPLETE BLOOD COUNT 67588     ARIK %               50.4 %    

1 Unknown

 

          COMPLETE BLOOD COUNT 93319     LY %                35.5 %    

1 Unknown

 

          COMPLETE BLOOD COUNT 96293     MON %               10.2 %    

1 Unknown

 

          COMPLETE BLOOD COUNT 13318     EOS  %              3.3 %     

1 Unknown

 

          COMPLETE BLOOD COUNT 44602     BASO %              0.6 %     

1 Unknown

 

          COMPLETE BLOOD COUNT 80234     RDW                 13.7 %    

1 Unknown

 

          COMPLETE BLOOD COUNT 13765     ABS ARIK             3.23 10e9/L 

011 Unknown

 

          COMPLETE BLOOD COUNT 99590     ABS LYMPH           2.27 10e9/L 

011 Unknown

 

          COMPLETE BLOOD COUNT 38245     ABS MONO            0.65 10e9/L 

011 Unknown

 

          COMPLETE BLOOD COUNT 62705     ABS EOS             0.21 10e9/L 

011 Unknown

 

          COMPLETE BLOOD COUNT 54682     ABS BASO            0.04 10e9/L 

011 Unknown

 

          COMPLETE BLOOD COUNT 66663     RDW-SD              45.3 fL   

1 Unknown

 

          GFR CALC  2616499   GFR AA              >60 ML/MIN 2011 Unknown

 

          GFR CALC  5219722   GFR NON-AA           53.0L ML/MIN 2011 Unkno

wn

 

          FREE T4   87068     FREE T4             1.20 NG/DL 2011 Unknown

 

          COMPREHENSIVE METABOLIC 34160     AST                 17 U/L    2011 Unknown

 

          COMPREHENSIVE METABOLIC 96091     ALT                 9 IU/L    2011 Unknown

 

          COMPREHENSIVE METABOLIC 52259     BUN                 16 MG/DL  2011 Unknown

 

          COMPREHENSIVE METABOLIC 20830     ALBUMIN             4.0 GM/DL 2011 Unknown

 

          COMPREHENSIVE METABOLIC 60263     CHLORIDE            108 MMOL/L  Unknown

 

          COMPREHENSIVE METABOLIC 09527     BILI TOT            0.5 MG/DL 2011 Unknown

 

          COMPREHENSIVE METABOLIC 48124     ALK PHOS            76 U/L    2011 Unknown

 

          COMPREHENSIVE METABOLIC 33366     SODIUM              139 MMOL/L  Unknown

 

          COMPREHENSIVE METABOLIC 26763     CREATININE           1.02 MG/DL 2011 Unknown

 

          COMPREHENSIVE METABOLIC 49473     CALCIUM             9.2 MG/DL 2011 Unknown

 

          COMPREHENSIVE METABOLIC 04925     POTASSIUM           4.5 MMOL/L  Unknown

 

          COMPREHENSIVE METABOLIC 38853     PROT TOT            6.1 GM/DL 2011 Unknown

 

          COMPREHENSIVE METABOLIC 98506     Glucose             93 MG/DL  2011 Unknown

 

          COMPREHENSIVE METABOLIC 95922     BICARB              26 MMOL/L 2011 Unknown

 

          COMPREHENSIVE METABOLIC 91633     ANION GAP           5 MEQ/L   2011 Unknown

 

          LIPID GROUP 53133     HDL TEST            46 MG/DL  2011 Unknown

 

          LIPID GROUP 01541     TRIG                102 MG/DL 2011 Unknown

 

          LIPID GROUP 05037     TEST LDL            88 MG/DL  2011 Unknown

 

          LIPID GROUP 97005     CHOL                154 MG/DL 2011 Unknown

 

          LIPID GROUP 04709     RCHOL/HDL           3.35 RATIO 2011 Unknow

n







Procedures





                    Procedure           Codes               Date

 

                    ROUTINE VENIPUNCTURE CPT-4: 47228        2020

 

                    ASSAY THYROID STIM HORMONE CPT-4: 45933        2020

 

                    COMPLETE CBC W/AUTO DIFF WBC CPT-4: 63300        2020

 

                    COMPREHEN METABOLIC PANEL CPT-4: 41627        2020

 

                    ROUTINE VENIPUNCTURE CPT-4: 62276        2019

 

                    LIPID PANEL         CPT-4: 19619        2019

 

                    FLU VACC PRSV FREE INC ANTIG 65 AND OLDER CPT-4: 91492      

  10/22/2019

 

                    FLU VACC PRSV FREE INC ANTIG 65 AND OLDER CPT-4: 24060      

  10/22/2019

 

                    ADMIN INFLUENZA VIRUS VAC CPT-4:         10/22/2019

 

                    COMPREHEN METABOLIC PANEL CPT-4: 81372        10/11/2019

 

                    ROUTINE VENIPUNCTURE CPT-4: 89266        10/11/2019

 

                    ROUTINE VENIPUNCTURE CPT-4: 59237        06/10/2019

 

                    ASSAY THYROID STIM HORMONE CPT-4: 70752        06/10/2019

 

                    COMPREHEN METABOLIC PANEL CPT-4: 39493        06/10/2019

 

                    COMPLETE CBC W/AUTO DIFF WBC CPT-4: 18854        06/10/2019

 

                    URINALYSIS NONAUTO W/O SCOPE CPT-4: 69819        2019

 

                    URINE CULTURE/ COLONY COUNT CPT-4: 12343        2019

 

                    URINE CULTURE/ COLONY COUNT CPT-4: 53188        10/02/2018

 

                    ROUTINE VENIPUNCTURE CPT-4: 45547        2018

 

                    ASSAY OF FREE THYROXINE CPT-4: 61105        2018

 

                    ASSAY THYROID STIM HORMONE CPT-4: 49419        2018

 

                    COMPLETE CBC W/AUTO DIFF WBC CPT-4: 48405        2018

 

                    METABOLIC PANEL TOTAL CA CPT-4: 11501        2018

 

                    FLU VACC PRSV FREE INC ANTIG 65 AND OLDER CPT-4: 88510      

  2018

 

                    ASSAY, GLUCOSE, BLOOD QUANT CPT-4: 24711        2018

 

                    ADMIN INFLUENZA VIRUS VAC CPT-4:         2018

 

                    ROUTINE VENIPUNCTURE CPT-4: 21413        2018

 

                    COMPREHEN METABOLIC PANEL CPT-4: 66374        2018

 

                    COMPLETE CBC W/AUTO DIFF WBC CPT-4: 60163        2018

 

                    A1C HPLC            CPT-4: 13387        2018

 

                    ASSAY OF TROPONIN QUANT CPT-4: 73321        2018

 

                    LIPID PANEL         CPT-4: 85684        2018

 

                    THER/PROPH/DIAG INJ SC/IM CPT-4: 32820        2018

 

                    TRIAMCINOLONE ACET INJ NOS CPT-4:         2018

 

                    URINALYSIS NONAUTO W/O SCOPE CPT-4: 27461        2018

 

                    URINE CULTURE/ COLONY COUNT CPT-4: 43034        2018

 

                    FLU VACC PRSV FREE INC ANTIG 65 AND OLDER CPT-4: 05605      

  10/06/2017

 

                    ADMIN INFLUENZA VIRUS VAC CPT-4:         10/06/2017

 

                    ROUTINE VENIPUNCTURE CPT-4: 93301        2017

 

                    COMPREHEN METABOLIC PANEL CPT-4: 46763        2017

 

                    COMPLETE CBC W/AUTO DIFF WBC CPT-4: 79367        2017

 

                    LIPID PANEL         CPT-4: 73180        2017

 

                    A1C HPLC            CPT-4: 94359        2017

 

                    ASSAY THYROID STIM HORMONE CPT-4: 81057        2017

 

                    ROUTINE VENIPUNCTURE CPT-4: 16913        2017

 

                    ASSAY THYROID STIM HORMONE CPT-4: 00921        2017

 

                    COMPREHEN METABOLIC PANEL CPT-4: 77419        2017

 

                    COMPLETE CBC W/AUTO DIFF WBC CPT-4: 90887        2017

 

                    A1C HPLC            CPT-4: 69389        2017

 

                    FLU VACC PRSV FREE INC ANTIG 65 AND OLDER CPT-4: 93665      

  10/07/2016

 

                    ADMIN INFLUENZA VIRUS VAC CPT-4:         10/07/2016

 

                    ROUTINE VENIPUNCTURE CPT-4: 13326        2016

 

                    ASSAY OF FREE THYROXINE CPT-4: 04243        2016

 

                    ASSAY THYROID STIM HORMONE CPT-4: 48089        2016

 

                    COMPREHEN METABOLIC PANEL CPT-4: 22692        2016

 

                    COMPLETE CBC W/AUTO DIFF WBC CPT-4: 15742        2016

 

                    LIPID PANEL         CPT-4: 02863        2016

 

                    A1C HPLC            CPT-4: 21987        2016

 

                    URINALYSIS NONAUTO W/O SCOPE CPT-4: 28639        2016

 

                    ROUTINE VENIPUNCTURE CPT-4: 06018        2015

 

                    METABOLIC PANEL TOTAL CA CPT-4: 95863        2015

 

                    PRESCRIP TRANSMIT VIA ERX SY CPT-4:         2015

 

                    FLU VACC PRSV FREE INC ANTIG 65 AND OLDER CPT-4: 77663      

  10/16/2015

 

                    ADMIN INFLUENZA VIRUS VAC CPT-4:         10/16/2015

 

                    URINALYSIS NONAUTO W/O SCOPE CPT-4: 02612        09/15/2015

 

                    URINE CULTURE/ COLONY COUNT CPT-4: 02269        09/15/2015

 

                    ROUTINE VENIPUNCTURE CPT-4: 68922        09/10/2015

 

                    ASSAY OF FREE THYROXINE CPT-4: 12786        09/10/2015

 

                    ASSAY THYROID STIM HORMONE CPT-4: 98614        09/10/2015

 

                    COMPREHEN METABOLIC PANEL CPT-4: 26723        09/10/2015

 

                    COMPLETE CBC W/AUTO DIFF WBC CPT-4: 45918        09/10/2015

 

                    LIPID PANEL         CPT-4: 98332        09/10/2015

 

                    A1C HPLC            CPT-4: 01750        09/10/2015

 

                    CERUM REMOVAL       CPT-4: 54393        2015

 

                    PRESCRIP TRANSMIT VIA ERX SY CPT-4:         2015

 

                    FLUZONE, 5ML (Medicare) CPT-4:         10/17/2014

 

                    ADMIN INFLUENZA VIRUS VAC CPT-4:         10/17/2014

 

                    PRESCRIP TRANSMIT VIA ERX SY CPT-4:         2014

 

                    PRESCRIP TRANSMIT VIA ERX SY CPT-4:         2014

 

                    PRESCRIP TRANSMIT VIA ERX SY CPT-4:         2014

 

                    ROUTINE VENIPUNCTURE CPT-4: 85080        2014

 

                    ASSAY OF FREE THYROXINE CPT-4: 97003        2014

 

                    ASSAY THYROID STIM HORMONE CPT-4: 38873        2014

 

                    COMPREHEN METABOLIC PANEL CPT-4: 61975        2014

 

                    COMPLETE CBC W/AUTO DIFF WBC CPT-4: 03059        2014

 

                    LIPID PANEL         CPT-4: 33074        2014

 

                    A1C HPLC            CPT-4: 18507        2014

 

                    VITAMIN B 12 FOLIC ACID CPT-4: 21284|75249  2014

 

                    PRESCRIP TRANSMIT VIA ERX SY CPT-4:         2014

 

                    PRESCRIP TRANSMIT VIA ERX SY CPT-4:         10/15/2013

 

                    FLUZONE, 5ML (Medicare) CPT-4:         2013

 

                    ADMIN INFLUENZA VIRUS VAC CPT-4:         2013

 

                    PRESCRIP TRANSMIT VIA ERX SY CPT-4:         2013

 

                    PRESCRIP TRANSMIT VIA ERX SY CPT-4:         05/10/2013

 

                    ROUTINE VENIPUNCTURE CPT-4: 42483        2012

 

                    ASSAY OF FREE THYROXINE CPT-4: 43089        2012

 

                    ASSAY THYROID STIM HORMONE CPT-4: 06981        2012

 

                    COMPREHEN METABOLIC PANEL CPT-4: 22361        2012

 

                    COMPLETE CBC W/AUTO DIFF WBC CPT-4: 92808        2012

 

                    LIPID PANEL         CPT-4: 96397        2012

 

                    A1C GLYCOSYLATED HEMOGLOBIN TEST CPT-4: 53660        

012

 

                    CERUM REMOVAL       CPT-4: 06467        2012

 

                    PRESCRIP TRANSMIT VIA ERX SY CPT-4:         2012

 

                    PRESCRIP TRANSMIT VIA ERX SY CPT-4:         10/10/2012

 

                    FLUZONE, 5ML (Medicare) CPT-4:         2012

 

                    ADMIN INFLUENZA VIRUS VAC CPT-4:         2012

 

                    ASSAY, GLUCOSE, BLOOD QUANT CPT-4: 98185        2012

 

                    URINALYSIS NONAUTO W/O SCOPE CPT-4: 76044        2012

 

                    URINE CULTURE/ COLONY COUNT CPT-4: 00963        2012

 

                    ROUTINE VENIPUNCTURE CPT-4: 63050        2012

 

                    ASSAY OF FREE THYROXINE CPT-4: 93808        2012

 

                    ASSAY THYROID STIM HORMONE CPT-4: 69661        2012

 

                    COMPREHEN METABOLIC PANEL CPT-4: 47942        2012

 

                    COMPLETE CBC W/AUTO DIFF WBC CPT-4: 18100        2012

 

                    LIPID PANEL         CPT-4: 49669        2012

 

                    ASSAY OF INSULIN    CPT-4: 84871        2012

 

                    A1C GLYCOSYLATED HEMOGLOBIN TEST CPT-4: 81810        

012

 

                    DRAIN/INJECT JOINT/BURSA CPT-4: 37250        2012

 

                    METHYLPREDNISOLONE 40 MG INJ CPT-4:         2012

 

                    TRIAMCINOLONE ACET INJ NOS CPT-4:         2012

 

                    PRESCRIP TRANSMIT VIA ERX SY CPT-4:         2012

 

                    PRESCRIP TRANSMIT VIA ERX SY CPT-4:         2012

 

                    METHYLPREDNISOLONE 40 MG INJ CPT-4:         2012

 

                    DRAIN/INJECT JOINT/BURSA CPT-4: 89357        2012

 

                    TRIAMCINOLONE ACET INJ NOS CPT-4:         2012

 

                    PRESCRIP TRANSMIT VIA ERX SY CPT-4:         2012

 

                    ROUTINE VENIPUNCTURE CPT-4: 07340        2012

 

                    ASSAY OF FREE THYROXINE CPT-4: 44320        2012

 

                    ASSAY THYROID STIM HORMONE CPT-4: 96294        2012

 

                    COMPREHEN METABOLIC PANEL CPT-4: 11801        2012

 

                    COMPLETE CBC W/AUTO DIFF WBC CPT-4: 80953        2012

 

                    LIPID PANEL         CPT-4: 41048        2012

 

                    PRESCRIP TRANSMIT VIA ERX SY CPT-4:         2012

 

                    CERUM REMOVAL       CPT-4: 79444        2011

 

                    PRESCRIP TRANSMIT VIA ERX SY CPT-4:         2011

 

                    FLUZONE, 5ML (Medicare) CPT-4:         10/05/2011

 

                    ADMIN INFLUENZA VIRUS VAC CPT-4:         10/05/2011

 

                    PRESCRIP TRANSMIT VIA ERX SY CPT-4:         2011

 

                    URINALYSIS NONAUTO W/O SCOPE CPT-4: 73632        2011

 

                    URINE CULTURE/ COLONY COUNT CPT-4: 91072        2011

 

                    CUR TOBACCO NON-USER CPT-4:         2011

 

                    ROUTINE VENIPUNCTURE CPT-4: 44557        2011

 

                    COMPLETE CBC W/AUTO DIFF WBC CPT-4: 30669        2011

 

                    COMPREHEN METABOLIC PANEL CPT-4: 74477        2011

 

                    LIPID PANEL         CPT-4: 28442        2011

 

                    ASSAY THYROID STIM HORMONE CPT-4: 42982        2011

 

                    ASSAY OF FREE THYROXINE CPT-4: 18391        2011

 

                    PRESCRIP TRANSMIT VIA ERX SY CPT-4:         2011

 

                    INJ TRIGGER POINT 1/2 MUSCL CPT-4: 01204        2011

 

                    TRIAMCINOLONE ACET INJ NOS CPT-4:         2011

 

                    METHYLPREDNISOLONE 40 MG INJ CPT-4:         2011

 

                    THER/PROPH/DIAG INJ SC/IM CPT-4: 99443        2011

 

                    KETOROLAC TROMETHAMINE INJ CPT-4:         2011

 

                    PRESCRIP TRANSMIT VIA ERX SY CPT-4:         2010

 

                    FLU VACCINE 3 YRS & > IM UP 64 CPT-4: 43823        10/06/201

0

 

                    ADMIN INFLUENZA VIRUS VAC CPT-4:         10/06/2010

 

                    URINALYSIS NONAUTO W/O SCOPE CPT-4: 66908        2010

 

                    URINE CULTURE/ COLONY COUNT CPT-4: 47191        2010

 

                    PRESCRIP TRANSMIT VIA ERX SY CPT-4:         2010

 

                    THER/PROPH/DIAG INJ SC/IM CPT-4: 52616        2010

 

                    VITAMIN B12 INJECTION CPT-4:         2010

 

                    THER/PROPH/DIAG INJ SC/IM CPT-4: 02906        2010

 

                    VITAMIN B12 INJECTION CPT-4:         2010

 

                    ROUTINE VENIPUNCTURE CPT-4: 42965        2010







Vital Signs





                          Date                      Vital

 

                    2020          Blood Pressure 1: 144/82 Code: 8480-6 He

art Rate 1: 56 bpm

 

                2020      Blood Pressure 1: 154/86 Code: 8480-6 Heart Rate

 1: 64 bpm 

Respiratory Rate: 20 bpm SpO2: 99%           Temperature: 37.1 (C) / 98.7 (F) We

ight: 215 

lbs 

 

             2019   Blood Pressure 1: 140/70 Code: 8480-6 Heart Rate 1: 57

 bpm SpO2: 99%    

Temperature: 35.9 (C) / 96.6 (F)        Weight: 215 lbs 

 

                06/10/2019      Blood Pressure 1: 144/70 Code: 8480-6 Heart Rate

 1: 60 bpm 

Respiratory Rate: 20 bpm SpO2: 95%           Temperature: 36.4 (C) / 97.6 (F) We

ight: 214 

lbs 

 

                2019      Blood Pressure 1: 128/78 Code: 8480-6 Heart Rate

 1: 56 bpm 

Respiratory Rate: 20 bpm SpO2: 98%           Temperature: 36.3 (C) / 97.4 (F) We

ight: 213 

lbs 

 

                2018      Blood Pressure 1: 142/60 Code: 8480-6 BMI: 38.2 

Code: 17087-6 Heart 

Rate 1: 48 bpm  Height: 5'2"    Respiratory Rate: 20 bpm SpO2: 98%       Tempera

ture: 36.7

(C) / 98.1 (F)                          Weight: 212 lbs 

 

                2018      Blood Pressure 1: 142/64 Code: 8480-6 BMI: 38.5 

Code: 08867-6 Heart 

Rate 1: 52 bpm  Height: 5'2"    Respiratory Rate: 22 bpm SpO2: 96%       Tempera

ture: 36.1

(C) / 96.9 (F)                          Weight: 214 lbs 

 

                10/02/2018      Blood Pressure 1: 124/80 Code: 8480-6 BMI: 38.3 

Code: 75689-1 Heart 

Rate 1: 68 bpm      Height: 5'2"        Respiratory Rate: 20 bpm Temperature: 36

.3 (C) / 

97.4 (F)                                Weight: 213 lbs 

 

                2018      Blood Pressure 1: 132/78 Code: 8480-6 BMI: 37.6 

Code: 85972-6 Heart 

Rate 1: 68 bpm  Height: 5'2"    Respiratory Rate: 20 bpm SpO2: 97%       Tempera

ture: 36.8

(C) / 98.2 (F)                          Weight: 209 lbs 

 

                2018      Blood Pressure 1: 150/76 Code: 8480-6 BMI: 38.5 

Code: 30055-1 Heart 

Rate 1: 64 bpm  Height: 5'2"    Respiratory Rate: 20 bpm SpO2: 97%       Tempera

ture: 36.2

(C) / 97.2 (F)                          Weight: 214 lbs 

 

                2018      Blood Pressure 1: 122/74 Code: 8480-6 BMI: 38.2 

Code: 65725-5 Heart 

Rate 1: 64 bpm  Height: 5'2"    Respiratory Rate: 18 bpm SpO2: 96%       Tempera

ture: 35.8

(C) / 96.4 (F)                          Weight: 212 lbs 

 

                2018      Blood Pressure 1: 124/78 Code: 8480-6 BMI: 37.8 

Code: 82834-5 Heart 

Rate 1: 76 bpm      Height: 5'2"        Respiratory Rate: 20 bpm Temperature: 36

.8 (C) / 

98.3 (F)                                Weight: 210 lbs 

 

                2018      Blood Pressure 1: 136/70 Code: 8480-6 BMI: 38.0 

Code: 99202-0 Heart 

Rate 1: 68 bpm  Height: 5'2"    Respiratory Rate: 20 bpm SpO2: 97%       Tempera

ture: 36.8

(C) / 98.2 (F)                          Weight: 211 lbs 

 

                2018      Blood Pressure 1: 140/65 Code: 8480-6 Heart Rate

 1: 75 bpm 

Respiratory Rate: 24 bpm SpO2: 95%           Temperature: 37.0 (C) / 98.6 (F) We

ight: 211 

lbs 

 

                2018      Blood Pressure 1: 154/70 Code: 8480-6 BMI: 37.6 

Code: 79524-1 Heart 

Rate 1: 76 bpm  Height: 5'2"    Respiratory Rate: 20 bpm SpO2: 98%       Tempera

ture: 36.9

(C) / 98.5 (F)                          Weight: 209 lbs 

 

                2017      Blood Pressure 1: 156/70 Code: 8480-6 BMI: 37.1 

Code: 12709-0 Heart 

Rate 1: 72 bpm  Height: 5'2"    Respiratory Rate: 20 bpm SpO2: 97%       Tempera

ture: 37.0

(C) / 98.6 (F)                          Weight: 206 lbs 

 

                2017      Blood Pressure 1: 152/78 Code: 8480-6 BMI: 36.8 

Code: 65323-7 Heart 

Rate 1: 78 bpm  Height: 5'2"    Respiratory Rate: 20 bpm SpO2: 98%       Tempera

ture: 36.1

(C) / 97.0 (F)                          Weight: 204 lbs 

 

                2017      Blood Pressure 1: 142/70 Code: 8480-6 BMI: 36.9 

Code: 88349-0 Heart 

Rate 1: 64 bpm  Height: 5'2"    Respiratory Rate: 20 bpm SpO2: 96%       Tempera

ture: 36.5

(C) / 97.7 (F)                          Weight: 205 lbs 

 

                2017      Blood Pressure 1: 142/68 Code: 8480-6 Heart Rate

 1: 88 bpm 

Respiratory Rate: 18 bpm SpO2: 98%           Temperature: 36.3 (C) / 97.3 (F) We

ight: 209 

lbs 

 

                2016      Blood Pressure 1: 130/78 Code: 8480-6 Heart Rate

 1: 68 bpm 

Respiratory Rate: 20 bpm SpO2: 95%           Temperature: 36.4 (C) / 97.6 (F) We

ight: 212 

lbs 

 

                2016      Blood Pressure 1: 122/64 Code: 8480-6 BMI: 39.1 

Code: 53502-9 Heart 

Rate 1: 76 bpm      Height: 5'2"        Respiratory Rate: 20 bpm Temperature: 36

.8 (C) / 

98.2 (F)                                Weight: 217 lbs 

 

                2016      Blood Pressure 1: 144/70 Code: 8480-6 BMI: 39.4 

Code: 84404-2 Heart 

Rate 1: 76 bpm      Height: 5'2"        Respiratory Rate: 20 bpm Temperature: 36

.6 (C) / 

97.9 (F)                                Weight: 219 lbs 

 

                2015      Blood Pressure 1: 152/60 Code: 8480-6 BMI: 39.6 

Code: 00783-1 Heart 

Rate 1: 84 bpm      Height: 5'2"        Respiratory Rate: 20 bpm Temperature: 37

.0 (C) / 

98.6 (F)                                Weight: 220 lbs 

 

                2015      Blood Pressure 1: 146/76 Code: 8480-6 BMI: 39.8 

Code: 69019-1 Heart 

Rate 1: 88 bpm      Height: 5'2"        Respiratory Rate: 20 bpm Temperature: 37

.0 (C) / 

98.6 (F)                                Weight: 221 lbs 

 

                2015      Blood Pressure 1: 132/70 Code: 8480-6 BMI: 39.1 

Code: 07522-2 Heart 

Rate 1: 88 bpm      Height: 5'2"        Respiratory Rate: 20 bpm Temperature: 36

.4 (C) / 

97.6 (F)                                Weight: 217 lbs 

 

                2015      Blood Pressure 1: 132/66 Code: 8480-6 BMI: 39.9 

Code: 68239-9 Heart 

Rate 1: 72 bpm      Height: 5'2"        Respiratory Rate: 20 bpm Temperature: 36

.9 (C) / 

98.4 (F)                                Weight: 218 lbs 

 

                2015      Blood Pressure 1: 136/80 Code: 8480-6 Heart Rate

 1: 76 bpm 

Respiratory Rate: 20 bpm  Temperature: 36.7 (C) / 98.0 (F) Weight: 224 lbs 

 

                2015      Blood Pressure 1: 134/78 Code: 8480-6 BMI: 39.7 

Code: 87799-3 Heart 

Rate 1: 84 bpm      Height: 5'2"        Respiratory Rate: 20 bpm Temperature: 36

.7 (C) / 

98.0 (F)                                Weight: 217 lbs 

 

                2014      Blood Pressure 1: 146/78 Code: 8480-6 BMI: 39.5 

Code: 13029-0 Heart 

Rate 1: 82 bpm      Height: 5'2"        Respiratory Rate: 18 bpm Temperature: 35

.6 (C) / 

96.1 (F)                                Weight: 216 lbs 

 

                2014      Blood Pressure 1: 134/70 Code: 8480-6 BMI: 37.9 

Code: 74428-1 Heart 

Rate 1: 80 bpm      Height: 5'3"        Respiratory Rate: 20 bpm Temperature: 36

.8 (C) / 

98.2 (F)                                Weight: 214 lbs 

 

                2014      Blood Pressure 1: 132/70 Code: 8480-6 BMI: 37.6 

Code: 88387-0 Heart 

Rate 1: 80 bpm      Height: 5'3"        Respiratory Rate: 20 bpm Temperature: 36

.8 (C) / 

98.3 (F)                                Weight: 212 lbs 

 

                2014      Blood Pressure 1: 116/74 Code: 8480-6 Heart Rate

 1: 68 bpm 

Respiratory Rate: 20 bpm  Temperature: 36.2 (C) / 97.1 (F) Weight: 212 lbs 

 

                10/15/2013      Blood Pressure 1: 132/82 Code: 8480-6 BMI: 37.4 

Code: 54708-1 Heart 

Rate 1: 76 bpm      Height: 5'3"        Respiratory Rate: 20 bpm Temperature: 36

.7 (C) / 

98.0 (F)                                Weight: 211 lbs 

 

                    2013          Blood Pressure 1: 130/76 Code: 8480-6 He

art Rate 1: 78 bpm

 

                2013      Blood Pressure 1: 140/82 Code: 8480-6 BMI: 36.8 

Code: 66784-1 Heart 

Rate 1: 66 bpm      Height: 5'3"        Respiratory Rate: 20 bpm Temperature: 36

.1 (C) / 

96.9 (F)                                Weight: 208 lbs 

 

                2013      Blood Pressure 1: 138/80 Code: 8480-6 BMI: 36.4 

Code: 27195-0 Heart 

Rate 1: 72 bpm      Height: 5'4"        Respiratory Rate: 20 bpm Temperature: 36

.7 (C) / 

98.0 (F)                                Weight: 212 lbs 

 

                2013      Blood Pressure 1: 124/70 Code: 8480-6 BMI: 36.9 

Code: 90716-7 Heart 

Rate 1: 60 bpm      Height: 5'4"        Temperature: 36.1 (C) / 97.0 (F) Weight:

 215 lbs 

 

                05/10/2013      Blood Pressure 1: 132/86 Code: 8480-6 BMI: 36.9 

Code: 93470-4 Heart 

Rate 1: 76 bpm      Height: 5'4"        Respiratory Rate: 20 bpm Temperature: 36

.8 (C) / 

98.2 (F)                                Weight: 215 lbs 

 

                2013      Blood Pressure 1: 134/82 Code: 8480-6 BMI: 36.6 

Code: 38322-8 Heart 

Rate 1: 72 bpm      Height: 5'4"        Respiratory Rate: 20 bpm Temperature: 36

.3 (C) / 

97.4 (F)                                Weight: 213 lbs 

 

                2012      Blood Pressure 1: 142/80 Code: 8480-6 BMI: 37.1 

Code: 95674-5 Heart 

Rate 1: 76 bpm      Height: 5'4"        Respiratory Rate: 20 bpm Temperature: 36

.8 (C) / 

98.3 (F)                                Weight: 216 lbs 

 

                10/23/2012      Blood Pressure 1: 128/68 Code: 8480-6 BMI: 37.6 

Code: 93536-7 Heart 

Rate 1: 72 bpm      Height: 5'4"        Respiratory Rate: 20 bpm Temperature: 36

.6 (C) / 

97.9 (F)                                Weight: 219 lbs 

 

                10/10/2012      Blood Pressure 1: 122/70 Code: 8480-6 Heart Rate

 1: 76 bpm 

Respiratory Rate: 20 bpm  Temperature: 36.7 (C) / 98.1 (F) Weight: 218 lbs 

 

                    2012          Blood Pressure 1: 132/80 Code: 8480-6 He

art Rate 1: 84 bpm

 

                2012      Blood Pressure 1: 128/78 Code: 8480-6 BMI: 38.1 

Code: 23001-9 Heart 

Rate 1: 84 bpm      Height: 5'4"        Respiratory Rate: 20 bpm Temperature: 36

.9 (C) / 

98.4 (F)                                Weight: 222 lbs 

 

                2012      Blood Pressure 1: 138/80 Code: 8480-6 BMI: 37.9 

Code: 66995-9 Heart 

Rate 1: 74 bpm      Height: 5'4"        Temperature: 36.1 (C) / 97.0 (F) Weight:

 221 lbs 

 

                2012      Blood Pressure 1: 126/78 Code: 8480-6 BMI: 38.4 

Code: 03229-1 Heart 

Rate 1: 72 bpm      Height: 5'4"        Respiratory Rate: 20 bpm Temperature: 36

.7 (C) / 

98.0 (F)                                Weight: 224 lbs 

 

                2012      Blood Pressure 1: 138/72 Code: 8480-6 BMI: 38.4 

Code: 86680-1 Heart 

Rate 1: 72 bpm      Height: 5'4"        Respiratory Rate: 20 bpm Temperature: 36

.6 (C) / 

97.9 (F)                                Weight: 224 lbs 

 

                2012      Blood Pressure 1: 122/78 Code: 8480-6 BMI: 38.8 

Code: 10993-9 Heart 

Rate 1: 88 bpm      Height: 5'4"        Respiratory Rate: 20 bpm Temperature: 36

.6 (C) / 

97.8 (F)                                Weight: 226 lbs 

 

                2012      Blood Pressure 1: 116/60 Code: 8480-6 BMI: 38.1 

Code: 21737-3 Heart 

Rate 1: 92 bpm      Height: 5'4"        Respiratory Rate: 20 bpm Temperature: 36

.8 (C) / 

98.2 (F)                                Weight: 222 lbs 

 

                2011      Blood Pressure 1: 118/62 Code: 8480-6 BMI: 37.9 

Code: 49807-3 Heart 

Rate 1: 80 bpm      Height: 5'4"        Temperature: 36.5 (C) / 97.7 (F) Weight:

 221 lbs 

 

                2011      Blood Pressure 1: 132/70 Code: 8480-6 Heart Rate

 1: 84 bpm 

Respiratory Rate: 20 bpm  Temperature: 36.7 (C) / 98.0 (F) Weight: 221 lbs 

 

                2011      Blood Pressure 1: 114/72 Code: 8480-6 BMI: 37.6 

Code: 08191-1 Heart 

Rate 1: 76 bpm      Height: 5'4"        Respiratory Rate: 20 bpm Temperature: 36

.8 (C) / 

98.3 (F)                                Weight: 219 lbs 

 

                    2011          Blood Pressure 1: 136/76 Code: 8480-6 Te

mperature: 36.0 (C) / 96.8 

(F)                                     Weight: 219 lbs 8 oz

 

                2011      Blood Pressure 1: 128/80 Code: 8480-6 Heart Rate

 1: 72 bpm 

Temperature: 36.3 (C) / 97.4 (F)        Weight: 218 lbs 

 

                2011      Blood Pressure 1: 126/70 Code: 8480-6 Heart Rate

 1: 72 bpm 

Temperature: 36.7 (C) / 98.0 (F)

 

                    2010          Blood Pressure 1: 126/78 Code: 8480-6 Te

mperature: 36.2 (C) / 97.1 

(F)                                     Weight: 217 lbs 8 oz

 

                2010      Blood Pressure 1: 116/70 Code: 8480-6 Heart Rate

 1: 72 bpm 

Temperature: 36.4 (C) / 97.6 (F)        Weight: 222 lbs 

 

                2010      Blood Pressure 1: 114/78 Code: 8480-6 Heart Rate

 1: 72 bpm 

Temperature: 37.1 (C) / 98.8 (F)        Weight: 225 lbs 

 

                2010      Blood Pressure 1: 128/78 Code: 8480-6 Heart Rate

 1: 76 bpm 

Temperature: 36.6 (C) / 97.8 (F)        Weight: 224 lbs 

 

                2010      Blood Pressure 1: 116/78 Code: 8480-6 Heart Rate

 1: 80 bpm 

Temperature: 36.4 (C) / 97.6 (F)        Weight: 226 lbs 

 

                2010      Blood Pressure 1: 120/70 Code: 8480-6 BMI: 38.3 

Code: 44930-3 Heart 

Rate 1: 88 bpm      Height: 5'5"        Temperature: 36.4 (C) / 97.6 (F) Weight:

 230 lbs 







Functional Status

No Functional Status data



Reason For Visit





                    Reason For Visit    Effective Dates     Notes

 

                    blood pressure check 2020           

 

                    high blood pressure 2020           

 

                    lab draw            2019           

 

                    lab draw            10/11/2019           

 

                    hearing loss        2019          left ear

 

                    follow up           06/10/2019           

 

                    follow up           2019          Patient has upcoming

 appointment with Dr Claire and carotid 

dopplers tomorrow

 

                    follow up           2018          Patient had heart ca

th on 10-30-18 and one of the bypass 

veins in spasming

 

                    follow up           2018          4 Week

 

                    follow up           10/02/2018           

 

                    follow up           2018           

 

                    high blood pressure 2018          Dr Claire has ordered

 holter monitor and 

hydralazine 50mg PRN

 

                    dizziness           2018          On  morning darren delvalle woke up and went to the bathroom 

and after lying down became very dizzy. Patient has hx of vertigo so didn't 
think anything of it. She usually just has them intermittently, but had more 
that day. While at [employer] began to feel very ill and became very shaky. She got 
home and sat in the recliner and had the jittery/nervous feeling. Later that 
night it finally resolved. Patient feels like she had a spell like this around 
the  and thought it was due to extreme heat exhaustion.

 

                    follow up           2018           

 

                    back pain           2018           

 

                    otalgia             2018           

 

                    back pain           2018           

 

                    injection(s)        10/06/2017          flu shot

 

                    lab draw            2017           

 

                    follow up           2017           

 

                    pelvic pain         2017          Patient states pain 

started in May with a "Constant Ache"

to left inguinal area. Patient states at that time pain was intermittent. Then, 
approximately 2nd week  of  pain worsened and radiates to left low back 
area.

 

                    lab draw            2017           

 

                    hyperglycemia       2017           

 

                    cough               2017           

 

                    otalgia             2016           

 

                    injection(s)        10/07/2016          Influenza

 

                    lab draw            2016           

 

                    constipation        2016           

 

                    flank pain          2016           

 

                    follow up           2015          3mo fwup

 

                    injection(s)        10/16/2015          Influenza

 

                    acute renal failure 09/15/2015           

 

                    lab draw            09/10/2015           

 

                    fatigue             2015           

 

                    otalgia             2015           

 

                    pain, limb          2015           

 

                    follow up           2015          [facility] fwup

 

                    high blood pressure 2015           

 

                    injection(s)        10/17/2014          flu shot

 

                    sinusitis           2014           

 

                    high blood pressure 2014           

 

                    cough               2014          Was panfilo at Dr Tyree teixeira's office so he started he on amlodipine 

10mg but seems to be dragging her down. Patient decreased to 1/2 tablet this 
last week

 

                    lab draw            2014           

 

                    cough               2014           

 

                    cough               10/15/2013           

 

                    high blood pressure 2013           

 

                    follow up           2013          ER

 

                    dizziness           2013           

 

                    follow up           2013           

 

                    otalgia             05/10/2013           

 

                    breast complaint    2013           

 

                    lab draw            2012           

 

                    follow up           2012          2mo fwup

 

                    follow up           10/23/2012          2wk fwup

 

                    gas and bloating    10/10/2012          Dr Claire has her mon

itoring because was high in his 

office at last visit

 

                    injection(s)        2012           

 

                    dizziness           2012           

 

                    dizziness           2012           

 

                    lab draw            2012           

 

                    muscle weakness     2012          concerns of simvasta

tin with fatigue and muscle 

weakness

 

                    leg pain/sciatica   2012           

 

                    hip pain            2012           

 

                    back pain           2012          has tried ice pack, 

muscle relaxers, and moist heat

 

                    back pain           2012           

 

                    fatigue             2012          in hands/feet

 

                    otalgia             2011           

 

                    follow up           2011          2wk fwup

 

                    back pain           2011          thinks ulcer may hav

e returned

 

                    lab draw            2011           

 

                    dizziness           2011          Left ear and facial 

pain, right ear pain 

 

                    follow up           2011          1wk fwup

 

                    hip pain            2011          feels very draggy, f

atigue, BP 80/63, normally running 

100's/60's

 

                    chills              2010           

 

                    injection(s)        10/06/2010          flu shot

 

                    follow up           2010          6wk fwup, had HH rep

aired and is starting to feel better, 

started on reglan 10mg tid

 

                    follow up           2010          hosp fwup

 

                    follow up           2010          1mo fwup, saw Dr Danna du and hasn't gave cardiac clearance 

yet for HH repair, did have chemical stress test yesterday and waiting on 
results

 

                    follow up           2010          from EGD/colonoscopy

--has Hiatal Hernia and wants to do 

repair

 

                    follow up           2010           







Encounters





             Encounter    Performer    Location     Codes        Date

 

                                        ([id number]) OFFICE/OUTPATIENT VISIT EST

Diagnosis: Essential hypertension[ICD10: I10]

Diagnosis: Palpitations[ICD10: R00.2]

Diagnosis: Stress reaction[ICD10: F43.0] Akanksha DRIVER [employer]            CPT-4: 06405              2020

 

                                        (34603) NURSE/OUTPATIENT VISIT EST

Diagnosis: Mixed hyperlipidemia[ICD10: E78.2] Akanksha DRIVER [employer]            CPT-4: 69739              2019

 

                                        ([id number]) NURSE/OUTPATIENT VISIT EST

Diagnosis: FLU VACCINE[ICD10: Z23] Akanksha KEY [employer]                       CPT-4: 50347              10/22/2019

 

                                        (17913) NURSE/OUTPATIENT VISIT EST

Diagnosis: Chronic kidney disease, stage 1[ICD10: N18.1] Akanksha DRIVER [employer] CPT-4: 79050              10/11/2019

 

                                        (44184) OFFICE/OUTPATIENT VISIT EST

Diagnosis: Acute serous otitis media, left ear[ICD10: H65.02] Nat DRIVER [employer] CPT-4: 46555              2019

 

                                        (83371) OFFICE/OUTPATIENT VISIT EST

Diagnosis: Essential (primary) hypertension[ICD10: I10]

Diagnosis: Coronary atherosclerosis due to calcified coronary lesion[ICD10: 
I25.84]

Diagnosis: Hypoglycemia, unspecified[ICD10: E16.2]

Diagnosis: Other fatigue[ICD10: R53.83]

Diagnosis: Urinary tract infection, site not specified[ICD10: N39.0] Akanksha DRIVER [employer] CPT-4: 93053        06/10/2019

 

                                        (65405) OFFICE/OUTPATIENT VISIT EST

Diagnosis: Essential (primary) hypertension[ICD10: I10]

Diagnosis: Gastro-esophageal reflux disease without esophagitis[ICD10: K21.9]

Diagnosis: Urinary tract infection, site not specified[ICD10: N39.0] Akanksha DRIVER DO [facility] CPT-4: 14393        2019

 

                                        (35328) OFFICE/OUTPATIENT VISIT EST

Diagnosis: Gastro-esophageal reflux disease without esophagitis[ICD10: K21.9]

Diagnosis: Epigastric pain[ICD10: R10.13] [employer]            CPT-4: 57816              2018

 

                                        (45873) OFFICE/OUTPATIENT VISIT EST

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

Diagnosis: Essential (primary) hypertension[ICD10: I10]

Diagnosis: Generalized anxiety disorder[ICD10: F41.1]

Diagnosis: Gastro-esophageal reflux disease without esophagitis[ICD10: K21.9] 

Akanksha DRIVER DO [facility] CPT-4: 18286        2018

 

                                        OFFICE/OUTPATIENT VISIT EST

Diagnosis: Gastro-esophageal reflux disease without esophagitis[ICD10: K21.9]

Diagnosis: Palpitations[ICD10: R00.2]

Diagnosis: Urinary tract infection, site not specified[ICD10: N39.0]

Diagnosis: Generalized anxiety disorder[ICD10: F41.1] Akanksha DRIVER [employer] [facility] CPT-4: 96875              10/02/2018

 

                                        (36479) NURSE/OUTPATIENT VISIT EST

Diagnosis: Coronary atherosclerosis due to calcified coronary lesion[ICD10: 
I25.84]

Diagnosis: Hypoglycemia, unspecified[ICD10: E16.2]

Diagnosis: Dizziness and giddiness[ICD10: R42]

Diagnosis: Occlusion and stenosis of bilateral carotid arteries[ICD10: I65.23] 

Akanksha DRIVER DO [facility] CPT-4: 03691        2018

 

                                        OFFICE/OUTPATIENT VISIT EST

Diagnosis: Epigastric pain[ICD10: R10.13]

Diagnosis: Generalized anxiety disorder[ICD10: F41.1]

Diagnosis: FLU VACCINE[ICD10: Z23] Akanksha KEY [employer] 

[facility]                       CPT-4: 08178              2018

 

                                        ([id number]) OFFICE/OUTPATIENT VISIT EST

Diagnosis: Essential (primary) hypertension[ICD10: I10]

Diagnosis: Hypoglycemia, unspecified[ICD10: E16.2]

Diagnosis: Gastro-esophageal reflux disease without esophagitis[ICD10: K21.9] 

Akanksha Xiangrodo BAUMAN OLIVIA DRIVER DO [facility] CPT-4: 43770        2018

 

                                        ([id number]) OFFICE/OUTPATIENT VISIT EST

Diagnosis: Dizziness and giddiness[ICD10: R42] Nat DRIVER DO [facility]            CPT-4: 56040              2018

 

                                        ([id number]) OFFICE/OUTPATIENT VISIT EST

Diagnosis: Cervicalgia[ICD10: M54.2]

Diagnosis: Other spondylosis with radiculopathy, cervical region[ICD10: M47.22] 

Akanksha Xiangrodo BAUMAN SAramis KRISTEL CASE [facility] CPT-4: 83316        2018

 

                                        ([id number]) OFFICE/OUTPATIENT VISIT EST

Diagnosis: Hypoglycemia, unspecified[ICD10: E16.2]

Diagnosis: Other spondylosis with radiculopathy, cervical region[ICD10: M47.22]

Diagnosis: Vertigo of central origin, bilateral[ICD10: H81.43]

Diagnosis: Cervicocranial syndrome[ICD10: M53.0] Akanksha Xiangndangela        DORITAANN MARIE SEENE 

SAramis KRISTEL [employer]         CPT-4: 80934              2018

 

                                        ([id number]) OFFICE/OUTPATIENT VISIT EST

Diagnosis: Benign paroxysmal vertigo, bilateral[ICD10: H81.13]

Diagnosis: Otalgia, bilateral[ICD10: H92.03] Nat DRIVER [employer]            CPT-4: 71706              2018

 

                                        (93480) OFFICE/OUTPATIENT VISIT EST

Diagnosis: Low back pain[ICD10: M54.5]

Diagnosis: Radiculopathy, lumbosacral region[ICD10: M54.17]

Diagnosis: Left lower quadrant pain[ICD10: R10.32] Akanksha BAKER KRISTEL [facility]         CPT-4: 64925              2018

 

                                        ([id number]) OFFICE/OUTPATIENT VISIT EST

Diagnosis: FLU VACCINE[ICD10: Z23] Akanksha BAUMAN SAramis OREND

[facility]                       CPT-4: 62347              10/06/2017

 

                                        (68046) OFFICE/OUTPATIENT VISIT EST

Diagnosis: Mixed hyperlipidemia[ICD10: E78.2]

Diagnosis: Essential (primary) hypertension[ICD10: I10]

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

Diagnosis: Impaired fasting glucose[ICD10: R73.01]

Diagnosis: Other fatigue[ICD10: R53.83] [facility]                    CPT-[facility]: 48395              2017

 

                                        ([id number]) OFFICE/OUTPATIENT VISIT EST

Diagnosis: Pain in thoracic spine[ICD10: M54.6]

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 

[facility] C[facility]: 48147        2017

 

                                        (68402) OFFICE/OUTPATIENT VISIT EST

Diagnosis: Left lower quadrant pain[ICD10: R10.32]

Diagnosis: Low back pain[ICD10: M54.5] Akanksha SYKES 

[facility]                    CPT-4: 71584              2017

 

                                        (67986) OFFICE/OUTPATIENT VISIT EST

Diagnosis: Mixed hyperlipidemia[ICD10: E78.2]

Diagnosis: Essential (primary) hypertension[ICD10: I10]

Diagnosis: Hypoglycemia, unspecified[ICD10: E16.2] [facility]         CPT-4: 06749              2017

 

                                        (31955) OFFICE/OUTPATIENT VISIT EST

Diagnosis: Impaired fasting glucose[ICD10: R73.01]

Diagnosis: Mastodynia[ICD10: N64.4]

Diagnosis: Mixed hyperlipidemia[ICD10: E78.2]

Diagnosis: Essential (primary) hypertension[ICD10: I10]

Diagnosis: Other fatigue[ICD10: R53.83] [facility]                    CPT-4: 94352              2017

 

                                        (13464) OFFICE/OUTPATIENT VISIT EST

Diagnosis: Acute upper respiratory infection, unspecified[ICD10: J06.9] Luba Stevenson              [facility] CPT-[facility]: 59622        2017

 

                                        (71044) OFFICE/OUTPATIENT VISIT EST

Diagnosis: Acute mastoiditis without complications, right ear[ICD10: H70.001] 

Akanksha DRIVER DO [facility] CPT-4: 51061        2016

 

                                        (82510) OFFICE/OUTPATIENT VISIT EST

Diagnosis: FLU VACCINE[ICD10: Z23] Akanksha KEY DO 

[facility]                       CPT-4: 69897              10/07/2016

 

                                        (25287) OFFICE/OUTPATIENT VISIT EST

Diagnosis: Mixed hyperlipidemia[ICD10: E78.2]

Diagnosis: Essential (primary) hypertension[ICD10: I10]

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

Diagnosis: Impaired fasting glucose[ICD10: R73.01] Akanksha DRIVER DO [facility]         CPT-4: 17734              2016

 

                                        (02919) OFFICE/OUTPATIENT VISIT EST

Diagnosis: Constipation, unspecified[ICD10: K59.00] Akanksha DRIVER DO [facility] CPT-4: 72648              2016

 

                                        (59106) OFFICE/OUTPATIENT VISIT EST

Diagnosis: Essential (primary) hypertension[ICD10: I10]

Diagnosis: Mixed hyperlipidemia[ICD10: E78.2]

Diagnosis: Chronic kidney disease, stage 1[ICD10: N18.1] Akanksha DRIVER DO [facility] CPT-4: 60106              2016

 

                                        (91539) OFFICE/OUTPATIENT VISIT EST

Diagnosis: Muscle weakness (generalized)[ICD10: M62.81]

Diagnosis: Dizziness and giddiness[ICD10: R42]

Diagnosis: Essential (primary) hypertension[ICD10: I10]

Diagnosis: History of falling[ICD10: Z91.81] Akanksha Xiangndangela        NYARAMOS DRIVER DO [facility]            CPT-4: 13547              2015

 

                                        (12538) OFFICE/OUTPATIENT VISIT EST

Diagnosis: FLU VACCINE[ICD10: Z23] Akanksha HIGHTOWERLINE OLIVIA KEY DO 

[facility]                       CPT-4: 00567              10/16/2015

 

                                        (95876) OFFICE/OUTPATIENT VISIT EST

Diagnosis: Acute renal failure[ICD9: 584.9]

Diagnosis: POLYURIA[ICD9: 788.42] Akanksha HIGHTOWERLINE OLIVIA LANCE [facility]                       CPT-4: 92577              09/15/2015

 

                                        ([id number]) OFFICE/OUTPATIENT VISIT EST

Diagnosis: HYPERLIPIDEMIA NEC/NOS[ICD9: 272.4]

Diagnosis: HYPERTENSION[ICD9: 401.9]

Diagnosis: CAD[ICD9: 414.00]

Diagnosis: Hyperglycemia[ICD9: 790.29]

Diagnosis: MALAISE AND FATIGUE[ICD9: 780.79] Akanksha Otoolendangela KEVINQUELIN

SHERRY DRIVER monEchelle [facility]            CPT-4: 30644              09/10/2015

 

                                        (91741) OFFICE/OUTPATIENT VISIT EST

Diagnosis: MALAISE AND FATIGUE[ICD9: 780.79]

Diagnosis: HYPOGLYCEMIA[ICD9: 251.2]

Diagnosis: Grieving[ICD9: 309.0]

Diagnosis: ABDOMINAL PAIN[ICD9: 789.00] Akanksha Xiangndangela HIGHTOWERLINE SAramis 

KRISTEL

[facility]                    CPT-4: 36643              2015

 

                                        OFFICE/OUTPATIENT VISIT EST

Diagnosis: CERUMEN IMPACTION[ICD9: 380.4]

Diagnosis: EUSTACHIAN TUBE DYSFUNCTION[ICD9: 381.81] Berthasa Mon      

AKANKSHA OLIVIA DRIVER [facility] CPT-4: 94131              2015

 

                                        (92608) OFFICE/OUTPATIENT VISIT EST

Diagnosis: Leg pain[ICD9: 729.5]

Diagnosis: SUPERFIC PHLEBITIS-LEG[ICD9: 451.0] Akanksha ROBERTSON SAramis 

KRISTEL monEchelle [facility]            CPT-4: 68680              2015

 

                                        (97472) OFFICE/OUTPATIENT VISIT EST

Diagnosis: Thoracic back pain[ICD9: 724.1]

Diagnosis: SPASM OF MUSCLE[ICD9: 728.85] Akanksha Xiangndangela HIGHTOWERLINE SAramis

 

KRISTEL CASE [facility]            CPT-4: 91087              2015

 

                                        ([id number]) OFFICE/OUTPATIENT VISIT EST

Diagnosis: DIZZINESS/VERTIGO[ICD9: 780.4]

Diagnosis: Benign positional vertigo[ICD9: 386.11]

Diagnosis: HYPERTENSION[ICD9: 401.9]

Diagnosis: Suspicious nevus[ICD9: 238.2] Akanksha DRIVER [facility]            CPT-4: 74990              2015

 

                                        (96319) OFFICE/OUTPATIENT VISIT EST

Diagnosis: FLU VACCINE[ICD10: Z23] Akanksha KEY [facility]                       CPT-4: 31312              10/17/2014

 

                                        OFFICE/OUTPATIENT VISIT EST

Diagnosis: SINUSITIS, ACUTE[ICD9: 461.9]

Diagnosis: EUSTACHIAN TUBE DYSFUNCTION[ICD9: 381.81] Bertha DRIVER [facility] CPT-4: 79776              2014

 

                                        (71851) OFFICE/OUTPATIENT VISIT EST

Diagnosis: DIZZINESS/VERTIGO[ICD9: 780.4]

Diagnosis: HYPERTENSION[ICD9: 401.9] [facility]                       CPT-[facility]: 44570              2014

 

                                        (30584) OFFICE/OUTPATIENT VISIT EST

Diagnosis: HYPERTENSION[ICD9: 401.9]

Diagnosis: MALAISE AND FATIGUE[ICD9: 780.79]

Diagnosis: SINUSITIS, ACUTE[ICD9: 461.9] Akanksha DRIVER [facility]            CPT-4: 46529              2014

 

                                        (98318) OFFICE/OUTPATIENT VISIT EST

Diagnosis: HYPERLIPIDEMIA NEC/NOS[ICD9: 272.4]

Diagnosis: HYPERTENSION[ICD9: 401.9]

Diagnosis: B12 DEFIC ANEMIA NEC[ICD9: 281.1]

Diagnosis: HYPOGLYCEMIA[ICD9: 251.2] Akanksha MEJIA [facility]                       CPT-4: 55928              2014

 

                                        OFFICE/OUTPATIENT VISIT EST

Diagnosis: COUGH[ICD9: 786.2] Bertha DRIVER [facility] 

CPT-4: 44061                            2014

 

                                        OFFICE/OUTPATIENT VISIT EST

Diagnosis: COUGH[ICD9: 786.2]

Diagnosis: URI, ACUTE[ICD9: 465.9] Bertha KEY [facility]                       CPT-4: 04477              10/15/2013

 

                                        ([id number]) OFFICE/OUTPATIENT VISIT EST

Diagnosis: HYPERTENSION[ICD9: 401.9]

Diagnosis: CEPHALGIA[ICD9: 784.0]

Diagnosis: ANXIETY STATE NOS[ICD9: 300.00]

Diagnosis: FLU VACCINE[ICD9: V04.81] Akanksha MEJIA [facility]                       CPT-4: 50114              2013

 

                                        (97023) OFFICE/OUTPATIENT VISIT EST

Diagnosis: DIZZINESS/VERTIGO[ICD9: 780.4]

Diagnosis: SINUSITIS, ACUTE[ICD9: 461.9]

Diagnosis: Benign positional vertigo[ICD9: 386.11] Akanksha IZQUIERDOMARCY

SAramis [facility]         CPT-4: 87243              2013

 

                                        OFFICE/OUTPATIENT VISIT EST

Diagnosis: OTITIS MEDIA NOS[ICD9: 382.9] Akanksha HIGHTOWERLINE SAramis

 

[facility]            CPT-4: 20689              2013

 

                                        OFFICE/OUTPATIENT VISIT EST

Diagnosis: Perforation of ear drum[ICD9: 384.20]

Diagnosis: SINUSITIS, ACUTE[ICD9: 461.9] Akanksha HIGHTOWERLINE SAramis

 

[facility]            CPT-4: 60354              05/10/2013

 

                                        (41315) OFFICE/OUTPATIENT VISIT EST

Diagnosis: Mastalgia[ICD9: 611.71] Akanksha CHEATHAMAramis TJ

[facility]                       CPT-4: 44896              2013

 

                                        (10214) OFFICE/OUTPATIENT VISIT EST

Diagnosis: HYPERLIPIDEMIA NEC/NOS[ICD9: 272.4]

Diagnosis: HYPERTENSION[ICD9: 401.9]

Diagnosis: DIZZINESS/VERTIGO[ICD9: 780.4]

Diagnosis: Hyperglycemia[ICD9: 790.29]

Diagnosis: MALAISE AND FATIGUE[ICD9: 780.79] Akanksha Xiangrodo TEIXEIRA SAramis 

[facility]            CPT-4: 85501              2012

 

                                        (37479) OFFICE/OUTPATIENT VISIT EST

Diagnosis: EUSTACHIAN TUBE DYSFUNCTION[ICD9: 381.81]

Diagnosis: DIZZINESS/VERTIGO[ICD9: 780.4]

Diagnosis: ABDOMINAL PAIN[ICD9: 789.00]

Diagnosis: GERD[ICD9: 530.81]

Diagnosis: CERUMEN IMPACTION[ICD9: 380.4] [facility]            CPT-4: 41851              2012

 

                                        OFFICE/OUTPATIENT VISIT EST

Diagnosis: ABDOMINAL PAIN[ICD9: 789.00]

Diagnosis: DYSPEPSIA[ICD9: 536.8] Akanksha LANCE [facility]                       CPT-4: 46340              10/23/2012

 

                                        (78734) OFFICE/OUTPATIENT VISIT EST

Diagnosis: ABDOMINAL PAIN[ICD9: 789.00]

Diagnosis: DYSPEPSIA[ICD9: 536.8]

Diagnosis: IBS[ICD9: 564.1] Akanksha DRIVER [facility] CPT

-

4: 67098                                10/10/2012

 

                                        OFFICE/OUTPATIENT VISIT EST

Diagnosis: DIZZINESS/VERTIGO[ICD9: 780.4]

Diagnosis: HYPOGLYCEMIA[ICD9: 251.2] Akanksha MEJIA [facility]                       CPT-4: 65771              2012

 

                                        (19561) OFFICE/OUTPATIENT VISIT EST

Diagnosis: URINARY TRACT INFECTION[ICD9: 599.0] Akanksha DRIVER [facility]            CPT-4: 48418              2012

 

                                        (93304) OFFICE/OUTPATIENT VISIT EST

Diagnosis: HYPERLIPIDEMIA NEC/NOS[ICD9: 272.4]

Diagnosis: HYPERTENSION[ICD9: 401.9]

Diagnosis: MALAISE AND FATIGUE[ICD9: 780.79]

Diagnosis: DIZZINESS/VERTIGO[ICD9: 780.4] [facility]            CPT-4: 74451              2012

 

                                        (05674) OFFICE/OUTPATIENT VISIT EST

Diagnosis: DIZZINESS/VERTIGO[ICD9: 780.4]

Diagnosis: MALAISE AND FATIGUE[ICD9: 780.79]

Diagnosis: HYPERTENSION[ICD9: 401.9] Akanksha MEJIA [facility]                       CPT-4: 61851              2012

 

                                        OFFICE/OUTPATIENT VISIT EST

Diagnosis: LUMB/LUMBOSAC DISC DEGEN[ICD9: 722.52]

Diagnosis: Radiculopathy of leg[ICD9: 724.4]

Diagnosis: SACROILIITIS NEC[ICD9: 720.2]

Diagnosis: SPINAL ENTHESOPATHY[ICD9: 720.1] Akanksha DRIVER [facility]            CPT-4: 02541              2012

 

                                        (27107) OFFICE/OUTPATIENT VISIT EST

Diagnosis: PAIN, LOWER BACK[ICD9: 724.2]

Diagnosis: SPASM OF MUSCLE[ICD9: 728.85]

Diagnosis: SCIATICA[ICD9: 724.3]

Diagnosis: Lumbar degenerative disc disease[ICD9: 722.52] Akanksha DRIVER [facility] CPT-4: 86816              2012

 

                                        (93914) OFFICE/OUTPATIENT VISIT EST

Diagnosis: PAIN IN THORACIC SPINE[ICD9: 724.1]

Diagnosis: SPASM OF MUSCLE[ICD9: 728.85] Akanksha DRIVER [facility]            CPT-4: 92104              2012

 

                                        (60054) OFFICE/OUTPATIENT VISIT EST

Diagnosis: PAIN, LOWER BACK[ICD9: 724.2]

Diagnosis: SPASM OF MUSCLE[ICD9: 728.85]

Diagnosis: Sacroiliac dysfunction[ICD9: 739.4]

Diagnosis: Lumbar degenerative disc disease[ICD9: 722.52] Akanksah DRIVER [facility] CPT-4: 67670              2012

 

                                        OFFICE/OUTPATIENT VISIT EST

Diagnosis: MALAISE AND FATIGUE[ICD9: 780.79]

Diagnosis: HYPOTENSION[ICD9: 458.9]

Diagnosis: CAD[ICD9: 414.00] Akanksha DRIVER [facility] 

CPT-4: 63593                            2012

 

                                        OFFICE/OUTPATIENT VISIT EST

Diagnosis: SINUSITIS, ACUTE[ICD9: 461.9]

Diagnosis: PHARYNGITIS, ACUTE[ICD9: 462]

Diagnosis: CERUMEN IMPACTION[ICD9: 380.4] [facility]ha [facility]            CPT-4: 95147              2011

 

                                        OFFICE/OUTPATIENT VISIT EST

Diagnosis: PAIN IN THORACIC SPINE[ICD9: 724.1]

Diagnosis: GERD[ICD9: 530.81]

Diagnosis: DIZZINESS/VERTIGO[ICD9: 780.4] Akanksha BAUMAN S

. 

[facility]            CPT-4: 04474              2011

 

                OFFICE/OUTPATIENT VISIT EST Akanksha OTOOLE

[facility] CPT-

4: 09271                                2011

 

                    (83967) OFFICE/OUTPATIENT VISIT EST Akanksha CASTILLO SAramis [facility]                       CPT-4: 99007              2011

 

                                        (47019) OFFICE/OUTPATIENT VISIT, EST

Diagnosis: PAIN, LOWER BACK[ICD9: 724.2] Akanksha BAUMAN SAramis

 

[facility]            CPT-4: 70939              2011

 

                    (31564) OFFICE/OUTPATIENT VISIT, EST Akanksha DE LA ROSA S. ORENDER DO

[facility]                       CPT-4: 68765              2011

 

                    (73100) OFFICE/OUTPATIENT VISIT, EST Akanksha DE LA ROSA S. [facility]

[facility]                       CPT-4: 14418              2010

 

                    (58768) OFFICE/OUTPATIENT VISIT, EST Akanksha DE LA ROSA S. [facility]

[facility]                       CPT-4: 79667              2010

 

                    (39473) OFFICE/OUTPATIENT VISIT, EST Akanksha DE LA ROSA S. [facility]

[facility]                       CPT-4: 24575              2010

 

                    (70852) OFFICE/OUTPATIENT VISIT, EST Akanksha DE LA ROSA S. [facility]

[facility]                       CPT-4: 69525              2010

 

                    (51979) OFFICE/OUTPATIENT VISIT, EST Akanksha DE LA ROSA S. [facility]

[facility]                       CPT-4: 75390              2010

 

                    (27625) OFFICE/OUTPATIENT VISIT, EST Akanksha DE LA ROSA S. [facility]

[facility]                       CPT-4: 98325              2010







Plan of Care





             Planned Activity Notes        Codes        Status       Date

 

                          Visit Diagnosis Plan: Essential hypertension Discussio

n: Increase metoprolol to 

25mg q AM and 50mg po q pM Recheck 1 week

                                        ICD-9 : 401.9

ICD-10 : I10

                                                    2020

 

                          Visit Diagnosis Plan: Palpitations Discussion: Check C

BC, CMP, TSH

                                        ICD-9 : 785.1

ICD-10 : R00.2

                                                    2020

 

                          Patient Education: metoprolol tartrate- [employer] [id number] 

https://www.[employer]/samplemd/resources/getResource/61/6l111818-3201-7z55-0o

                                        Completed           2020

 

                    Care Plan: X-RAY EXAM NECK SPINE 4/5VWS                     

LOINC : 87744-0

                          Pending                   2020

 

                    Care Plan: X-RAY EXAM THORAC SPINE4/>VW                     

LOINC : 08192-3

                          Pending                   2020

 

                                        Appointment: Akanksha Driver

WPtel:[phone]

                                        [address] [place] [address]                                              LAB             2019

 

                                        Appointment: Akanksha Driver

WPtel:[phone]

                                        [address] [place] [address]                                              INJECTION       10/22/2019

 

             Patient Education: INFLUENZA VACCINE [facility]                           

Completed    10/22/2019

 

                                        Appointment: Akanksha Driver

WPtel:[phone]

                                        [address] [place] [address]                                              LAB             10/11/2019

 

                          Visit Diagnosis Plan: Acute serous otitis media, left 

ear Discussion: instructed

to use flonase and claritin daily for symptom relief. discussed with patient 
that if no improvement in 2 weeks, will need to follow up with ENT for audiology
exam. instructed to call office with any other symptoms such as otalgia, 
dysphagia, fever, etc.

                                        ICD-9 : 381.01

ICD-10 : H65.02

                                                    2019

 

                                        Appointment: Nat Lozoya

                                        [address] [place] [address]                                              ACUTE ILLNESS   2019

 

                          Visit Diagnosis Plan: Other fatigue Discussion: Check 

CBC, TSH

                                        ICD-9 : 780.79

ICD-10 : R53.83

                                                    06/10/2019

 

                          Visit Diagnosis Plan: Hypoglycemia, unspecified Discus

madeleine: Monitor BS At least 

6 small meals a day each with protein

                                        ICD-9 : 251.2

ICD-10 : E16.2

                                                    06/10/2019

 

                          Visit Diagnosis Plan: Essential (primary) hypertension

 Discussion: Stable Check 

CMP

                                        ICD-9 : 401.9

ICD-10 : I10

                                                    06/10/2019

 

                          Visit Diagnosis Plan: Urinary tract infection, site no

t specified Discussion: 

Started an old abx prescription

                                        ICD-9 : 599.0

ICD-10 : N39.0

                                                    06/10/2019

 

                                        Appointment: Akanksha Driver

WPtel:[phone]

                                        [address] [place], [place] [id number]

[place]                                              FOLLOW UP       06/10/2019

 

                          Visit Diagnosis Plan: Urinary tract infection, site no

t specified Discussion: 

Macrobid 100mg po BID for 1 week

                                        ICD-9 : 599.0

ICD-10 : N39.0

                                                    2019

 

                          Visit Diagnosis Plan: Gastro-esophageal reflux disease

 without esophagitis 

Discussion: Stable on omeprazole

Follow Up: 3 months

                                        ICD-9 : 530.81

ICD-10 : K21.9

                                                    2019

 

                          Visit Diagnosis Plan: Essential (primary) hypertension

 Discussion: Stable Sees 

Dr. Clements this month

                                        ICD-9 : 401.9

ICD-10 : I10

                                                    2019

 

                                        Appointment: Akanksha Driver

WPtel:[phone]

                                        [address] [place], [place] [id number]

[place]                                              FOLLOW UP       2019

 

                          Patient Education: metoprolol tartrate- [employer] [facility] [id number] 

https://www.[employer]/samplemd/resources/getResource/61/786p7e01-3tzv-32br-40

                                        Completed           2019

 

                                        Appointment: Akanksha Driver

WPtel:[phone]

                                        [address] [place], [place] [id number]

[place]                                              CANCELED        02/15/2019

 

                          Visit Diagnosis Plan: Gastro-esophageal reflux disease

 without esophagitis 

Discussion: Continue protonix and carafate Proceed with updated EGD

                                        ICD-9 : 530.81

ICD-10 : K21.9

                                                    2018

 

                          Visit Diagnosis Plan: Epigastric pain Discussion: [facility]

k CT abdomen/pelvis due to

ongoing abdominal pain

                                        ICD-9 : 789.06

ICD-10 : R10.13

                                                    2018

 

                                        Appointment: Akanksha Driver

WPtel:[phone]

                                        [address] [place], [place] [id number]

[place]                                              FOLLOW UP       2018

 

                    Care Plan: CT PELVIS W/O DYE                     LOINC : 361

08-9

                          Pending                   2018

 

                    Care Plan: CT ABDOMEN W/O DYE                     LOINC : 36

103-0

                          Pending                   2018

 

                    Care Plan: Referral Order                     SNOMED-CT : 30

[id number]

                          Pending                   2018

 

                                        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

art disease of native coronary artery 

without angina pectoris                 Discussion: S/P cardiac cath--doing medi

vicki management 

with imdur and metoprolol increased Has fwup with cardiology next week Discussed
cardiac rehab

                                        ICD-9 : 414.00

ICD-10 : I25.10

                                                    2018

 

                          Visit Diagnosis Plan: Gastro-esophageal reflux disease

 without esophagitis 

Discussion: Continue protonix at BID dosing and carafate BID

Follow Up: 2 months

                                        ICD-9 : 530.81

ICD-10 : K21.9

                                                    2018

 

                          Visit Diagnosis Plan: Essential (primary) hypertension

 Discussion: Stable

                                        ICD-9 : 401.9

ICD-10 : I10

                                                    2018

 

                          Visit Diagnosis Plan: Generalized anxiety disorder Dis

cussion: Stable

                                        ICD-9 : 300.00

ICD-10 : F41.1

                                                    2018

 

                                        Appointment: Akanksha Driver

WPtel:[phone] [address] [id number]

                                              FOLLOW UP       2018

 

                          Visit Diagnosis Plan: Gastro-esophageal reflux disease

 without esophagitis 

Discussion: Continue protonix at BID dosing Continue carafate at q AC and HS 
dosing for 2 more weeks then decrease to BID dosing

Follow Up: 4 weeks

                                        ICD-9 : 530.81

ICD-10 : K21.9

                                                    10/02/2018

 

                          Visit Diagnosis Plan: Generalized anxiety disorder Dis

cussion: Increase xanax to

BID dosing especially with upcoming cardiac testing which is already making 
patient more anxious and worried

                                        ICD-9 : 300.00

ICD-10 : F41.1

                                                    10/02/2018

 

                          Visit Diagnosis Plan: Palpitations Discussion: Cardilo

logy going to schedule 

Lexiscan

                                        ICD-9 : 785.1

ICD-10 : R00.2

                                                    10/02/2018

 

                          Visit Diagnosis Plan: Urinary tract infection, site no

t specified Discussion: 

Reculture urine

                                        ICD-9 : 599.0

ICD-10 : N39.0

                                                    10/02/2018

 

                                        Appointment: Akanksha Driver

WPtel:[phone]

                                        [address] [place], [place] [id number]

                                              FOLLOW UP       10/02/2018

 

             Patient Education: Patient Medication Summary                      

     Completed    10/02/2018

 

                                        Appointment: Akanksha Driver

WPtel:[phone]

                                        [address] [place], [place] [id number]

                                              LAB             2018

 

             Patient Education: Patient Medication Summary                      

     Completed    2018

 

                          Visit Diagnosis Plan: Generalized anxiety disorder Dis

cussion: Did discuss 

increased risk of benzodiazepines causing dementia but at this time will stay at
xanax 0.25mg po BID

                                        ICD-9 : 300.00

ICD-10 : F41.1

                                                    2018

 

                          Visit Diagnosis Plan: Epigastric pain Discussion: Cont

inue protonix and carafate

but make into a slurry Flu shot given Discussed EGD if symptoms persist

Follow Up: 2 weeks

                                        ICD-9 : 789.06

ICD-10 : R10.13

                                                    2018

 

                                        Appointment: Akanksha Driver

WPtel:[phone]

                                        [address] [place] [address]                                              FOLLOW UP       2018

 

             Patient Education: Patient Medication Summary                      

     Completed    2018

 

                          Visit Diagnosis Plan: Gastro-esophageal reflux disease

 without esophagitis 

Discussion: Change to protonix 40mg po BID

Follow Up: 1 months

                                        ICD-9 : 530.81

ICD-10 : K21.9

                                                    2018

 

                          Visit Diagnosis Plan: Essential (primary) hypertension

 Discussion: Has 

hydralazine to use prn per cardiology Going to do 30 day holter monitor

                                        ICD-9 : 401.9

ICD-10 : I10

                                                    2018

 

                          Visit Diagnosis Plan: Hypoglycemia, unspecified Discus

madeleine: 6 small meals a 

day--each with protein Increase fluid intake

                                        ICD-9 : 251.2

ICD-10 : E16.2

                                                    2018

 

                                        Appointment: Akanksha Driver

WPtel:[phone]

                                        [address] [place] [address]                                              ACUTE ILLNESS   2018

 

             Patient Education: Patient Medication Summary                      

     Completed    2018

 

                          Visit Diagnosis Plan: Dizziness and giddiness Discussi

on: blood work to be 

completed in office including cmp, cbc, troponin to rule out glucose 
intolerance, infection, dehydration. instructed patient that she needs to see 
her cardiologist sooner than oct and patient requested we contact dr. clements's 
office. will have front office make appt. patient has carotid doppler annually.

                                        ICD-9 : 780.4

ICD-10 : R42

                                                    2018

 

                                        Appointment: Nat Lozoya

                                        [facility] [address]                                              ACUTE ILLNESS   2018

 

             Patient Education: Patient Medication Summary                      

     Completed    2018

 

                          Visit Diagnosis Plan: Cervicalgia Discussion: Complete

 course of PT since 

symptoms improved Continue stretching exercises Notify if symptoms return or 
worsen

                                        ICD-9 : 723.1

ICD-10 : M54.2

                                                    2018

 

                                        Appointment: Akanksha Driver

WPtel:[phone]

                                        [facility]                                              FOLLOW UP       2018

 

             Patient Education: Patient Medication Summary                      

     Completed    2018

 

                          Visit Diagnosis Plan: Vertigo of central origin, bilat

eral Discussion: Discussed

PT for vestibular therapy

                                        ICD-9 : 386.2

ICD-10 : H81.43

                                                    2018

 

                          Visit Diagnosis Plan: Other spondylosis with radiculop

athy, cervical region 

Discussion: Check MRI of cervical spine

                                        ICD-9 : 721.0

ICD-10 : M47.22

                                                    2018

 

                          Visit Diagnosis Plan: Hypoglycemia, unspecified Discus

madeleine: Eat something with 

protein every 2 hrs

                                        ICD-9 : 251.2

ICD-10 : E16.2

                                                    2018

 

                                        Appointment: Akanksha Driver

WPtel:[phone]

                                        [facility] [id number]

                                                              2018

 

             Patient Education: Patient Medication Summary                      

     Completed    2018

 

                    Care Plan: MRI NECK SPINE W/O DYE                     LOINC 

: [id number]

                          Pending                   2018

 

                          Visit Diagnosis Plan: Benign paroxysmal vertigo, bilat

eral Discussion: patient 

has meclizine at home but states it makes her too tired. instructed patient to 
cut in half and take at night. if it doesn't cause too much drowsiness, 
instructed to take bid until feeling better. instructed to rtc wed if no 
improvement or worsening symptoms. instructed patient to increase fluid intake 
as well.

                                        ICD-9 : 386.11

ICD-10 : H81.13

                                                    2018

 

                          Visit Diagnosis Plan: Otalgia, bilateral Discussion: k

enalog 40 mg given to 

patient im. instructed patient to monitor blood sugar at home due to risk of 
increased sugar. instructed patient to rtc on wednesday if no improvement and 
may require antibiotic.

                                        ICD-9 : 388.70

ICD-10 : H92.03

                                                    2018

 

                                        Appointment: Nat Lozoya

                                        [address] [place] [address]                                              ACUTE ILLNESS   2018

 

             Patient Education: Patient Medication Summary                      

     Completed    2018

 

                          Visit Diagnosis Plan: Low back pain Discussion: Stretc

hes Topical aspercreme 

Tylenol 1 po TID

                                        ICD-9 : 724.2

ICD-10 : M54.5

                                                    2018

 

                          Visit Diagnosis Plan: Radiculopathy, lumbosacral regio

n Discussion: As above

                                        ICD-9 : 724.4

ICD-10 : M54.17

                                                    2018

 

                          Visit Diagnosis Plan: Left lower quadrant pain Discuss

ion: Culture urine Notify 

if worsens

                                        ICD-9 : 789.04

ICD-10 : R10.32

                                                    2018

 

                                        Appointment: Akanksha Driver

WPtel:[phone]

                                        [address] [place] [address]                                              ACUTE ILLNESS   2018

 

             Patient Education: Patient Medication Summary                      

     Completed    2018

 

                                        Appointment: Akanksha Driver

WPtel:[phone]

                                        [address] [place] [address]

[place]                                              INJECTION       10/06/2017

 

             Patient Education: Patient Medication Summary                      

     Completed    10/06/2017

 

                                        Appointment: Akanksha Driver

WPtel:[phone]

                                        [address] [place] [address]

[place]                                              LAB             2017

 

             Patient Education: Patient Medication Summary                      

     Completed    2017

 

                          Visit Diagnosis Plan: Other intervertebral disc degene

ration, lumbar region 

Follow Up: 3 months

                                        ICD-9 : 722.52

ICD-10 : M51.36

                                                    2017

 

                          Visit Diagnosis Plan: Pain in thoracic spine Discussio

n: PT has helped Continue 

stretches and walking [facility]ed [facility]

                                        ICD-9 : 724.1

ICD-10 : M54.6

                                                    2017

 

                                        Appointment: Akanksha Driver

WPtel:[phone]

                                        [address] [place] [address]                                              FOLLOW UP       2017

 

             Patient Education: Patient Medication Summary                      

     Completed    2017

 

                          Visit Diagnosis Plan: Left lower quadrant pain Discuss

ion: Had CT scan of 

abdomen/pelvis in 2017 and normal colonoscopy in 2015

                                        ICD-9 : 789.04

ICD-10 : R10.32

                                                    2017

 

                          Visit Diagnosis Plan: Low back pain Discussion: Start 

PT for low back- Seems 

like abdominal pain is radiating from low back

Follow Up: 5 weeks

                                        ICD-9 : 724.2

ICD-10 : M54.5

                                                    2017

 

                                        Appointment: Akanksha Driver

WPtel:[phone]

                                        [address] [place] [address]                                              ACUTE ILLNESS   2017

 

             Patient Education: Patient Medication Summary                      

     Completed    2017

 

                                        Appointment: Akanksha Driver

WPtel:[phone]

                                        [address] [place], [place] [id number]

[place]                                              LAB             2017

 

             Patient Education: Patient Medication Summary                      

     Completed    2017

 

                          Visit Diagnosis Plan: Mixed hyperlipidemia Discussion:

 Check lipids

                                        ICD-9 : 272.4

ICD-10 : E78.2

                                                    2017

 

                          Visit Diagnosis Plan: Mastodynia Discussion: Check Jace

ateral diagnostic 

mammogram with US

                                        ICD-9 : 611.71

ICD-10 : N64.4

                                                    2017

 

                          Visit Diagnosis Plan: Impaired fasting glucose Discuss

ion: Return in AM for CMP,

HbA1C Accuchecks daily Diet/Exercise discussed at length

                                        ICD-9 : 790.29

ICD-10 : R73.01

                                                    2017

 

                          Visit Diagnosis Plan: Other fatigue Discussion: Check 

CBC, TSH

                                        ICD-9 : 780.79

ICD-10 : R53.83

                                                    2017

 

                                        Appointment: Akanksha Driver

WPtel:[phone]

                                        [address] [place], [place] [id number]

[place]              3/ confirmed `sl                 ACUTE ILLNESS   2017

 

             Patient Education: Patient Medication Summary                      

     Completed    2017

 

                    Care Plan: MAMMOGRAM SCREENING                     LOINC : 2

[id number]

                          Pending                   2017

 

                          Visit Diagnosis Plan: Acute upper respiratory infectio

n, unspecified Discussion:

Exam is reassuring Likely viral illness Supportive care reviewed and encouraged 
Follow up PRN

                                        ICD-9 : 465.9

ICD-10 : J06.9

                                                    2017

 

                                        Appointment: Luba Stevenson 

                                        [address] [place] [address]                                              ACUTE ILLNESS   2017

 

             Patient Education: Patient Medication Summary                      

     Completed    2017

 

                          Visit Plan:               Supportive care. Rest, Fluid

s, Tylenol/Motrin prn fever or 

bodyaches. Notify if worsening symptoms.

                                                            2016

 

                                        Appointment: Akanksha Driver

WPtel:[phone]

                                        [address] [place] [address]                                              ACUTE ILLNESS   2016

 

             Patient Education: Patient Medication Summary                      

     Completed    2016

 

                                        Appointment: Akanksha Driver

WPtel:[phone]

                                        [address] [place] [address]                                              INJECTION       10/07/2016

 

             Patient Education: Patient Medication Summary                      

     Completed    10/07/2016

 

                                        Appointment: Akanksha Driver

WPtel:[phone]

                                        [address] [place] [address]                                              LAB             2016

 

             Patient Education: Patient Medication Summary                      

     Completed    2016

 

                          Visit Plan:               Add miralax daily Use daily 

probiotic and metamucil and push fluids

Notify if worsens/persists

                                                            2016

 

                                        Appointment: Akanksha Driver

WPtel:[phone]

                                        [address] [place] [address]              16 confirmed ~sl                 ACUTE ILLNESS   2016

 

             Patient Education: Patient Medication Summary                      

     Completed    2016

 

                          Visit Plan:               No NSAIDs Kidney function di

scussed Discussed hydration Monitor lab

every 4months so will check CMP, HbA1C next month

                                                            2016

 

                                        Appointment: Akanksha Driver

WPtel:[phone]

                                        [address] [place] [address]              03/15 confirmed ~sl                 ACUTE ILLNESS   2016

 

             Patient Education: Patient Medication Summary                      

     Completed    2016

 

                          Visit Plan:               Vestibular exercises Refill 

flonase Strict low Na diet--discussed 

that needs to read labels Rx for walker with chair given due to muscle 
weakness/unsteadiness Has carotids and abdominal aorta and legs checked in 
January Check Chem 7

                                                            2015

 

                                        Appointment: Akanksha Driver

WPtel:[phone]

                                        [address] [place] [address]              12/15/15 appt confirmed cn                 FOLLOW UP       

 

             Patient Education: Patient Medication Summary                      

     Completed    2015

 

             Patient Education: Vertigo                           Completed    1

2015

 

                                        Appointment: Akanksha Driver

WPtel:[phone]

                                        [address] [place] [address]

[place]                                              INJECTION       10/16/2015

 

             Patient Education: Patient Medication Summary                      

     Completed    10/16/2015

 

                                        Appointment: Akanksha Driver

WPtel:[phone]

                                        [address] [place] [address]                                              UA              09/15/2015

 

             Patient Education: Patient Medication Summary                      

     Completed    09/15/2015

 

                                        Referral: Landon De La Torre

WPtel:[phone]

#[facility] [address]              Referral                        Completed       2015

 

                                        Appointment: Akanksha Driver

WPtel:[phone]

                                        [address] [place] [address]                                              LAB             09/10/2015

 

             Patient Education: Patient Medication Summary                      

     Completed    09/10/2015

 

                          Visit Plan:               Check CMP, CBC, TSH, Free T4

, B12, Lipids, HbA1C Discussed 6 small 

meals a day each with protein Would likely benefit from antidepressant Update 
colonoscopy

                                                            2015

 

                                        Appointment: Akanksha Driver

WPtel:[phone]

                                        [address] [place], [place] [id number]              9/8/15 confirmed                 ACUTE ILLNESS   2015

 

             Patient Education: Patient Medication Summary                      

     Completed    2015

 

                          Visit Plan:               Cerumen flush with warm wate

r and peroxide - good results Resume 

Nasonex nasal spray daily Recommended Debrox earwax removal drops

                                                            2015

 

                                        Appointment: Betrha Mon

WPtel:[phone]

                                        [address] [place], [place] [id number]                                              ACUTE ILLNESS   2015

 

             Patient Education: Patient Medication Summary                      

     Completed    2015

 

                          Visit Plan:               Continue aspirin daily Add m

eloxicam for 1week Elevate and Ice Call

on 2015

 

                                        Appointment: Akanksha Driver

WPtel:[phone]

                                        [address] [place] [address]                                              ACUTE ILLNESS   2015

 

             Patient Education: Patient Medication Summary                      

     Completed    2015

 

                          Visit Plan:               Been using baclofen at [facility] and has helped some PT for next 

2-4weeks

                                                            2015

 

                                        Appointment: Akanksha Driver

WPtel:[phone]

                                        [address] [place] [address] Follow Up 2015

 

             Patient Education: Patient Medication Summary                      

     Completed    2015

 

                                        Appointment: Akanksha Driver

WPtel:[phone]

                                        [address] [place] [address]                                              LAB             2015

 

                                        Appointment: Akanksha Driver

WPtel:[phone]

                                        [address] [place] [address]              feeling better, weather -                 FOLLOW UP       

 

                                        Referral: Landon De La Torre

WPtel:[phone]

[facility]              Referral                        Appointment Requested 2015

 

                          Visit Plan:               Continue exercise and curren

t meds See surgery for removal of right

arm lesion

                                                            2015

 

                                        Appointment: Akanksha Driver

WPtel:[phone]

                                        [address] [place], [place] [address]                                              FOLLOW UP       2015

 

             Patient Education: Patient Medication Summary                      

     Completed    2015

 

                                        Appointment: Akanksha Driver

WPtel:[phone]

                                        [address] [place], [place] [id number]

[place]                                              INJECTION       10/17/2014

 

             Patient Education: Patient Medication Summary                      

     Completed    10/17/2014

 

                                        Appointment: Bertha Mon

WPtel:[phone]

                                        [address] [place], [place] [id number]                                              ACUTE ILLNESS   2014

 

             Patient Education: Patient Medication Summary                      

     Completed    2014

 

                          Visit Plan:               Discussed that likely lumbar

 etiology for leg weakness Will change 

amlodopine to low dose dyazide and see if helps legs and inner ear

                                                            2014

 

                                        Appointment: Akanksha Driver

WPtel:[phone]

                                        [address] [place] [address]                                              FOLLOW UP       2014

 

             Patient Education: Patient Medication Summary                      

     Completed    2014

 

                          Visit Plan:               Ceftin and continue claritin

/flonase Decrease amlodopine to 2.5mg q

HS until fwup with Card due to weakness

                                                            2014

 

                                        Appointment: Akanksha Driver

WPtel:[phone]

                                        [address] [place] [address]                                              ACUTE ILLNESS   2014

 

             Patient Education: Patient Medication Summary                      

     Completed    2014

 

                                        Appointment: Akanksha Driver

WPtel:[phone]

                                        [address] [place] [address]                                              LAB             2014

 

             Patient Education: Patient Medication Summary                      

     Completed    2014

 

                                        Appointment: Bertha Mon

WPtel:[phone]

                                        [address] [place] [address]                                              ACUTE ILLNESS   2014

 

             Patient Education: Patient Medication Summary                      

     Completed    2014

 

                          Visit Plan:               Supportive care. Rest, Fluid

s, Tylenol prn fever or bodyaches. 

Notify if worsening symptoms. Ceftin

                                                            10/15/2013

 

                                        Appointment: Bertha Mon

WPtel:[phone]

                                        [address] [place] [address]                                              ACUTE ILLNESS   10/15/2013

 

             Patient Education: Patient Medication Summary                      

     Completed    10/15/2013

 

                                        Appointment: Akanksha Driver

WPtel:[phone]

                                        [address] [place] [address]                                              BP CHECK        2013

 

             Patient Education: Patient Medication Summary                      

     Completed    2013

 

                                        Appointment: Akanksha Driver

WPtel:[phone]

                                        [address] [place], [place] [id number]

                                              ER Follow UP    2013

 

             Patient Education: Patient Medication Summary                      

     Completed    2013

 

                          Visit Plan:               Restart flonase BID Use mecl

izine 25mg q HS Vestibular exercises 

Claritin 10mg q AM See ENT if doesn't resolve

                                                            2013

 

                                        Appointment: Akanksha Driver

WPtel:[phone]

                                        [address] [place] [address]                                              ACUTE ILLNESS   2013

 

             Patient Education: Patient Medication Summary                      

     Completed    2013

 

                          Visit Plan:               Will continue to observe

                                                            2013

 

                                        Appointment: Akanksha Driver

WPtel:[phone]

                                        [address] [place] [address]                                              FOLLOW UP       2013

 

             Patient Education: Patient Medication Summary                      

     Completed    2013

 

                          Visit Plan:               ERx for Augmentin 875mg q12 

x 10 days and Floxin otic drops 5 gtts 

AD x7days Sample of Nasonex per pt request Discussed treatment (nasal saline, 
salt water gargles, keeping right ear clean and dry, for worsening go to UC on 
weekend, Tylenol/ibuprofen, etc.) RTC 2 weeks for ear recheck.

                                                            05/10/2013

 

                                        Appointment: Jill Jean 

WPtel:[phone]

                                        [address] [place] [address]                                              ACUTE ILLNESS   05/10/2013

 

             Patient Education: Patient Medication Summary                      

     Completed    05/10/2013

 

                          Visit Plan:               Decrease caffeine intake Marina

ck Bilateral Mammogram with US of left 

breast

                                                            2013

 

                                        Appointment: Akanksha Driver

WPtel:[phone]

                                        [address] [place], [place] [id number]                                              ACUTE ILLNESS   2013

 

             Patient Education: Patient Medication Summary                      

     Completed    2013

 

                                        Appointment: Kate Hall

WPtel:[phone]

                                        [address] [place], [place] [id number]

              appt scheduled                  ACUTE ILLNESS   2013

 

                                        Appointment: Akanksha Driver

WPtel:[phone]

                                        [address] [place], [place] [id number]

[place]                                              LAB             2012

 

             Patient Education: Patient Medication Summary                      

     Completed    2012

 

                          Visit Plan:               Continue off carafate and se

e how does Continue probiotic Add 

Vestibular exercises and use meclizine prn Continue Nasonex Check fasting lab 
including CMP, Lipids, CBC, TSH, Free T4, HbA1C

                                                            2012

 

                                        Appointment: Akanksha Drivertel:[phone]

                                        [address] [place], [place] [id number]

                                              FOLLOW UP       2012

 

             Patient Education: Patient Medication Summary                      

     Completed    2012

 

                          Visit Plan:               Continue carafate for 4more 

weeks at current dose then decrease to 

BID for 2wks then q HS

                                                            10/23/2012

 

                                        Appointment: Akanksha Driver

WPtel:[phone]

                                        [address] [place], [place] [id number]                                              FOLLOW UP       10/23/2012

 

             Patient Education: Patient Medication Summary                      

     Completed    10/23/2012

 

                          Visit Plan:               Continue omeprazole Add Ellyn

fate 1gm po q AC Add daily probiotic

                                                            10/10/2012

 

                                        Appointment: Akanksha Driver

WPtel:[phone]

                                        [address] [place] [address]                                              ACUTE ILLNESS   10/10/2012

 

             Patient Education: Patient Medication Summary                      

     Completed    10/10/2012

 

                                        Appointment: Akanksha Drivertel:[phone]

                                        [address] [place], [place] [id number]                                              INJECTION       2012

 

             Patient Education: Patient Medication Summary                      

     Completed    2012

 

                          Visit Plan:               Diabetic Diet Accuchecks BID

 alternating times Hold onglyza and 

Januvia for now and continue diet and exercise and weight loss and moniter BS 
Discussed hypoglycemia and snack of peanut butter and crackers with juice or 
milk

                                                            2012

 

                                        Appointment: Akanksha Drivertel:[phone]

                                        [address] [place], [place] [id number]

                                              WORK IN         2012

 

             Patient Education: Patient Medication Summary                      

     Completed    2012

 

                                        Appointment: Akanksha Driver

WPtel:[phone]

                                        [address] [place] [address]                                              UA              2012

 

             Patient Education: Patient Medication Summary                      

     Completed    2012

 

                                        Appointment: Akanksha Drivertel:[phone]

                                        [address] [place] [address]                                              LAB             2012

 

             Patient Education: Patient Medication Summary                      

     Completed    2012

 

                                        Appointment: Akanksha Driver

WPtel:[phone]

                                        [address] [place] [address]                                              ACUTE ILLNESS   2012

 

             Patient Education: Patient Medication Summary                      

     Completed    2012

 

                          Visit Plan:               Injection to Right SI joint 

as above Pt will call in 3 days on pain

Has PT starting in 2wks.

                                                            2012

 

                                        Appointment: Akanksha Driver

WPtel:[phone]

                                        [address] [place] [address]                                              ACUTE ILLNESS   2012

 

             Patient Education: Patient Medication Summary                      

     Completed    2012

 

                          Visit Plan:               OMT done Start PT May need u

pdated MRI if need to consider epidural

Prednisone

                                                            2012

 

                                        Appointment: Akanksha Driver

WPtel:[phone]

                                        [address] [place] [address]                                              OMT             2012

 

             Patient Education: Patient Medication Summary                      

     Completed    2012

 

                          Visit Plan:               Flexeril and Vimovo OMT done

 Daily stretches

                                                            2012

 

                                        Appointment: Akanksha Driver

WPtel:[phone]

                                        [address] [place] [address]                                              ACUTE ILLNESS   2012

 

             Patient Education: Patient Medication Summary                      

     Completed    2012

 

                          Visit Plan:               OMT done Daily stretches Vinod

st heat or biofreeze Right SI joint 

injection Call in 1week Vimovo BID

                                                            2012

 

                                        Appointment: Akanksha Driver

WPtel:[phone]

                                        [address] [place] [address]                                              ACUTE ILLNESS   2012

 

             Patient Education: Patient Medication Summary                      

     Completed    2012

 

                                        Appointment: Akanksha Driver

WPtel:[phone]

                                        [address] [place], [place] [id number]

[place]                                              LAB             2012

 

             Patient Education: Patient Medication Summary                      

     Completed    2012

 

                          Visit Plan:               Decrease amlodopine to 1.25m

g daily Schedule with Cardiology 

Fasting lab in AM

                                                            2012

 

                                        Appointment: Akanksha Drivertel:[phone]

                                        [address] [place], [place] [id number]

                                              ACUTE ILLNESS   2012

 

             Patient Education: Patient Medication Summary                      

     Completed    2012

 

                          Visit Plan:               Saline nasal flushes prn. Ty

lenol/Motrin prn headache. Notify if 

persists/symptoms worsening. Cerumen removal from ears as above

                                                            2011

 

                                        Appointment: Akanksha Driver

WPtel:[phone]

                                        [address] [place], [place] [id number]                                              ACUTE ILLNESS   2011

 

             Patient Education: Patient Medication Summary                      

     Completed    2011

 

                                        Appointment: Akanksha Driver

WPtel:[phone]

                                        [address] [place], [place] [id number]

[place]                                              INJECTION       10/05/2011

 

             Patient Education: Patient Medication Summary                      

     Completed    10/05/2011

 

                          Visit Plan:               Continue vimovo for 1more we

ek Increase Omeprazole to BID for 1mo 

then resume QD if stomach improved Vestibular exercises with meclizine q HS for 
next week

                                                            2011

 

                                        Appointment: Akanksha Driver

WPtel:[phone]

                                        [address] [place], [place] [id number]

                                              FOLLOW UP       2011

 

             Patient Education: Patient Medication Summary                      

     Completed    2011

 

                                        Appointment: Akanksha Driver

WPtel:[phone]

                                        [address] [place], [place] [id number]

                                              ACUTE ILLNESS   2011

 

             Patient Education: Patient Medication Summary                      

     Completed    2011

 

                                        Appointment: Akanksha Driver

WPtel:[phone]

                                        [address] [place], [place] [id number]

[place]                                              LAB             2011

 

             Patient Education: Patient Medication Summary                      

     Completed    2011

 

                          Visit Plan:               Saline nasal flushes prn. Ty

lenol/Motrin prn headache. Notify if 

persists/symptoms worsening. Add Veramyst Increase Omeprazole to 20mg po BID

                                                            2011

 

                                        Appointment: Akanksha Driver

WPtel:[phone]

                                        [address] [place] [address]                                              ACUTE ILLNESS   2011

 

             Patient Education: Patient Medication Summary                      

     Completed    2011

 

                                        Appointment: Akanksha Driver

WPtel:[phone]

                                        [address] [place] [address]                                              FOLLOW UP       2011

 

             Patient Education: Patient Medication Summary                      

     Completed    2011

 

                                        Appointment: Akanksha Driver

WPtel:[phone]

                                        [address] [place] [address]                                              ACUTE ILLNESS   2011

 

             Patient Education: Patient Medication Summary                      

     Completed    2011

 

                          Visit Plan:               Nasocourt sample given. Pt. 

will notify if symptoms are worse on 

Monday.

                                                            2010

 

                                        Appointment: Kate Hall

WPtel:[phone]

                                        [address] [place] [address]                                              ACUTE ILLNESS   2010

 

             Patient Education: Patient Medication Summary                      

     Completed    2010

 

                                        Appointment: Akanksha Driver

WPtel:[phone]

                                        [address] [place] [address]

[place]                                              INJECTION       10/06/2010

 

             Patient Education: Patient Medication Summary                      

     Completed    10/06/2010

 

                                        Appointment: Akanksha Driver

WPtel:[phone]

                                        [address] [place] [address]                                              FOLLOW UP       2010

 

             Patient Education: Patient Medication Summary                      

     Completed    2010

 

             Patient Education: Lexapro                           Completed    0

2010

 

                          Visit Plan:               May proceed with hiatal mykel

ia repair per Card. so will contact Dr. Cash's office to proceed with surgery Trial of Lexapro 5mg QD plus use 
xanax prn

                                                            2010

 

                                        Appointment: Akanksha Driver

WPtel:[phone]

                                        [address] [place], [place] [address]

                                              FOLLOW UP       2010

 

             Patient Education: Patient Medication Summary                      

     Completed    2010

 

                          Visit Plan:               B12 given Cont oral B12 and 

iron Fwup 1mo for B12 Proceed with 

hiatal hernia repair once card clearance

                                                            2010

 

                                        Appointment: Akanksha Driver

WPtel:[phone]

                                        [address] [place], [place] [id number]

                                              FOLLOW UP       2010

 

             Patient Education: Patient Medication Summary                      

     Completed    2010

 

                                        Appointment: Akanksha Driver

WPtel:[phone]

                                        [address] [place], [place] [id number]

                                              FOLLOW UP       2010

 

             Patient Education: Patient Medication Summary                      

     Completed    2010

 

                                        Appointment: Akanksha Driver

WPtel:[phone]

                                        [address] [place], [place] [id number]

[place]                                              LAB             2010

 

             Patient Education: Patient Medication Summary                      

     Completed    2010

 

                          Visit Plan:               Check fasting lab in AM--CMP

,Lipids, CBC, Vit D, TSH,FreeT4, B12

                                                            2010

 

                                        Appointment: Akanksha Driver

WPtel:[phone]

                                        [address] [place], [place] [address]                                              FOLLOW UP       2010

 

             Patient Education: Patient Medication Summary                      

     Completed    2010

 

                                        Referral: Landon De La Torre

WPtel:[phone]

#1 [facility]              Referral                        Appointment Requested  

 

                                        Referral: Landon De La Torre

WPtel:[phone]

#1 [address]              Referral                        Initiated        







Instructions





                                        Comment

 

                                        . Supportive care.  Rest, Fluids, Tyleno

l/Motrin prn fever or bodyaches.  Notify

if worsening symptoms.



 

                                        . Add miralax daily

Use daily probiotic and metamucil and push fluids

Notify if worsens/persists

 

                                        . No NSAIDs

Kidney function discussed

Discussed hydration 

Monitor lab every 4months so will check CMP, HbA1C next month

 

                                        . Vestibular exercises

Refill flonase

Strict low Na diet--discussed that needs to read labels

Rx for walker with chair given due to muscle weakness/unsteadiness

Has carotids and abdominal aorta and legs checked in January

Check Chem 7



 

                                        . Check CMP, CBC, TSH, Free T4, B12, Lip

ids, HbA1C

Discussed 6 small meals a day each with protein

Would likely benefit from antidepressant 

Update colonoscopy

 

                                        . Cerumen flush with warm water and tyler

xide - good results 

Resume Nasonex nasal spray daily

Recommended Debrox earwax removal drops 

 

                                        . Continue aspirin daily

Add meloxicam for 1week

Elevate and Ice

Call on Monday

 

                                        . Been using baclofen at bedtime and has

 helped some

PT for next 2-4weeks



 

                                        . Continue exercise and current meds

See surgery for removal of right arm lesion

 

                                        . Discussed that likely lumbar etiology 

for leg weakness

Will change amlodopine to low dose dyazide and see if helps legs and inner ear

 

                                        . Ceftin and continue claritin/flonase

Decrease amlodopine to 2.5mg q HS until fwup with Card due to weakness

 

                                        .  Supportive care.  Rest, Fluids, Tylen

ol prn fever or bodyaches.  Notify if 

worsening symptoms.

Ceftin

 

                                        . Restart flonase BID

Use meclizine 25mg q HS

Vestibular exercises

Claritin 10mg q AM

See ENT if doesn't resolve

 

                                        . Will continue to observe



 

                                        . ERx for Augmentin 875mg q12 x 10 days 

and Floxin otic drops 5 gtts AD x7days

Sample of Nasonex per pt request

Discussed treatment (nasal saline, salt water gargles, keeping right ear clean 
and dry, for worsening go to UC on weekend, Tylenol/ibuprofen, etc.)

RTC 2 weeks for ear recheck.

 

                                        . Decrease caffeine intake

Check Bilateral Mammogram with US of left breast

 

                                        Left ear flushed with warm water and per

oxide with ear syringe and then last 

removed with currette--peroxide drops instilled.  Tolerated well, no 
complications, TM intact.. Continue off carafate and see how does

Continue probiotic

Add Vestibular exercises and use meclizine prn

Continue Nasonex

Check fasting lab including CMP, Lipids, CBC, TSH, Free T4, HbA1C

 

                                        . Continue carafate for 4more weeks at c

urrent dose then decrease to BID for 

2wks then q HS

 

                                        . Continue omeprazole

Add Carafate 1gm po q AC

Add daily probiotic



 

                                        . Diabetic Diet

Accuchecks BID alternating times

Hold onglyza and Januvia for now and continue diet and exercise and weight loss 
and moniter BS

Discussed hypoglycemia and snack of peanut butter and crackers with juice or 
milk

 

                                        Informed Consent obtainded.  Right SI yasir

int cleansed with alcohol and betadine 

and injected with 3cc 1%l lidocaine with 40mg depomedrol and 40mg kenalog, 
tolerated well with no complications, neosporin and bandage applied. Injection 
to Right SI joint as above

Pt will call in 3 days on pain

Has PT starting in 2wks.

 

                                        . OMT done

Start PT

May need updated MRI if need to consider epidural

Prednisone

 

                                        . Flexeril and Vimovo

OMT done

Daily stretches

 

                                        Right SI joint cleansed with alchohol an

d betadine and injected with 3cc 1% 

lidocaine with 40mg depomedrol and 40mg kenalog, tolerated well with no 
complications, neosporin and bandage applied. OMT done

Daily stretches

Moist heat or biofreeze

Right SI joint injection

Call in 1week

Vimovo BID

 

                                        . Decrease amlodopine to 1.25mg daily

Schedule with Cardiology

Fasting lab in AM

 

                                        .  Saline nasal flushes prn. Tylenol/Mot

rin prn headache.  Notify if 

persists/symptoms worsening.

Cerumen removal from ears as above



 

                                        . Continue vimovo for 1more week

Increase Omeprazole to BID for 1mo then resume QD if stomach improved

Vestibular exercises with meclizine q HS for next week

 

                                        . Saline nasal flushes prn. Tylenol/Motr

in prn headache.  Notify if 

persists/symptoms worsening.

Add Veramyst

Increase Omeprazole to 20mg po BID

 

                                        . Nasocourt sample given.  Pt. will noti

fy if symptoms are worse on Monday. 

 

                                        . May proceed with hiatal hernia repair 

per Card. so will contact Dr. Cash's office to proceed with surgery



Trial of Lexapro 5mg QD plus use xanax prn

 

                                        . B12 given

Cont oral B12 and iron

Fwup 1mo for B12

Proceed with hiatal hernia repair once card clearance

 

                                        . Check fasting lab in AM--CMP,Lipids, C

BC, Vit D, TSH,FreeT4, B12







Medical Equipment

No Medical Equipment data



Health Concerns Section

Health Concerns data not found



Goals Section

Goals data not found



Interventions Section

Interventions data not found



Health Status Evaluations/Outcomes Section

Health Status Evaluations/Outcomes data not found



Advance Directives

No Advance Directive data

## 2020-02-19 NOTE — XMS REPORT
CCD document using C-CDA

                             Created on: 2019



Leilani Ramírez

External Reference #: [id number]

: 1940

Sex: Female



Demographics





                          Address                   [address] [place]  [address]

 

                          Home Phone                [phone]

 

                          Preferred Language        English

 

                          Marital Status            

 

                          Gnosticist Affiliation     Unknown

 

                          Race                      White

 

                          Ethnic Group              Not  or 





Author





                          Author                    Leilani Driver D.O.

 

                          Organization              [facility]

 

                          Address                   [address] [place]  [address]



 

                          Phone                     [phone]







Care Team Providers





                    Care Team Member Name Role                Phone

 

                    Akanksha Driver D.O. PP                  Unavailable

 

                          CCM                       Unavailable



                                            



Summary Purpose

          Interface Exchange                                                    
               



Insurance Providers

                      



                    Payer name                   Policy type / Coverage type    

               

Covered party ID                   Effective Begin Date                   

Effective End Date                

 

                    [employer] [place]

                   

[id number]                   2018                   Unknown                

 

                    [employer]                   Medicare Part B 

                  

[id number]                   [id number]                   Unknown                



                                                                                
       



Family history

                      



Father            



                          Diagnosis                   Age At Onset              

  

 

                          Heart disease                   Unknown               

 



            



Mother            



                          Diagnosis                   Age At Onset              

  

 

                          Heart disease                   Unknown               

 

 

                          Cancer                    Unknown                



                                                                                
       



Social History

                      



                    Social History Element                   Codes              

     Description    

                                        Effective Dates                

 

                    Tobacco history                   SNOMED CT: [id number]      

             Never 

smoker                                  2011                

 

                    Marital status                   Unknown                   S

daniela               

                                        2010                

 

                    Number of children                   Unknown                

   9                

                                        2010                



                                                                                
                           



Allergies, Adverse Reactions, Alerts

                      



                Substance                   Reaction                   Codes    

               

Entered Date                   Inactivated Date                   Status        

       

 

                                                            * NO KNOWN FOOD STAR

RGIES                                   

                                    Unknown                   2010        

           No 

Inactive Date                           Active                

 

                                        _                                       

                  

Unknown                   2010                   No Inactive Date         

                                        Active                

 

                                        _                                       

                  

Unknown                   2019                   No Inactive Date         

                                        Active                

 

                                                            QUINIDINE-QUININE AN

ALOGUES                                 

                    hives,                    Unknown                   20

10               

                          No Inactive Date                   Active             

   



                                                                                
                           



Past Medical History

                      



                    Illness                   Codes                   Condition 

Status              

                          Onset Date                   Resolved Date            

    

 

                                                            Acute serous otitis 

media, left ear                         

                                        ICD-9: 381.01

ICD-10: H65.02                   Active                    2019           

 

                                        Unknown                

 

                                                            Coronary atheroscler

osis due to calcified coronary lesion   

                                        ICD-9: 414.00

ICD-10: I25.84                   Active                    2018           

 

                                        Unknown                

 

                                                            Essential (primary) 

hypertension                            

                                        ICD-9: 401.9

ICD-10: I10                   Active                    2014              

 

                                        Unknown                

 

                                                            Hypoglycemia, unspec

ified                                   

                                        ICD-9: 251.2

ICD-10: E16.2                   Active                    2017            

 

                                        Unknown                

 

                                              Other fatigue                     

                ICD-9: 

780.79

ICD-10: R53.83                   Active                    2017           

 

                                        Unknown                

 

                                                            Urinary tract infect

ion, site not specified                 

                                        ICD-9: 599.0

ICD-10: N39.0                   Active                    10/02/2018            

 

                                        Unknown                

 

                                                            Gastro-esophageal re

flux disease without esophagitis        

                                        ICD-9: 530.81

ICD-10: K21.9                   Active                    2018            

 

                                        Unknown                

 

                                              Epigastric pain                   

                  ICD-9: 

789.06

ICD-10: R10.13                   Active                    2018           

 

                                        Unknown                

 

                                                            Atherosclerotic hear

t disease of native coronary artery 

without angina pectoris                                     ICD-9: 414.00

ICD-10: I25.10                   Active                    2018           

 

                                        Unknown                

 

                                                            Generalized anxiety 

disorder                                

                                        ICD-9: 300.00

ICD-10: F41.1                   Active                    2018            

 

                                        Unknown                

 

                                              Palpitations                      

               ICD-9: 

785.1

ICD-10: R00.2                   Active                    10/02/2018            

 

                                        Unknown                

 

                                              Dizziness and giddiness           

                          

ICD-9: 780.4

ICD-10: R42                   Active                    2014              

 

                                        Unknown                

 

                                                            Occlusion and stenos

is of bilateral carotid arteries        

                                        ICD-9: 433.10

ICD-10: I65.23                   Active                    2018           

 

                                        Unknown                

 

                                              FLU VACCINE                       

              ICD-9: 

V04.81

ICD-10: Z23                   Active                    2012              

 

                                        Unknown                

 

                                              Cervicalgia                       

              ICD-9: 723.1

ICD-10: M54.2                   Active                    2018            

 

                                        Unknown                

 

                                                            Other spondylosis wi

th radiculopathy, cervical region       

                                        ICD-9: 721.0

ICD-10: M47.22                   Active                    2018           

 

                                        Unknown                

 

                                              Cervicocranial syndrome           

                          

ICD-9: 723.2

ICD-10: M53.0                   Active                    2018            

 

                                        Unknown                

 

                                                            Vertigo of central o

rigin, bilateral                        

                                        ICD-9: 386.2

ICD-10: H81.43                   Active                    2018           

 

                                        Unknown                

 

                                                            Benign paroxysmal ve

rtigo, bilateral                        

                                        ICD-9: 386.11

ICD-10: H81.13                   Active                    2018           

 

                                        Unknown                

 

                                              Otalgia, bilateral                

                     ICD-

9: 388.70

ICD-10: H92.03                   Active                    2018           

 

                                        Unknown                

 

                                                            Left lower quadrant 

pain                                    

                                        ICD-9: 789.04

ICD-10: R10.32                   Active                    2017           

 

                                        Unknown                

 

                                              Low back pain                     

                ICD-9: 

724.2

ICD-10: M54.5                   Active                    2017            

 

                                        Unknown                

 

                                                            Radiculopathy, lumbo

sacral region                           

                                        ICD-9: 724.4

ICD-10: M54.17                   Active                    2018           

 

                                        Unknown                

 

                                                            Impaired fasting glu

cose                                    

                                        ICD-9: 790.29

ICD-10: R73.01                   Active                    2012           

 

                                        Unknown                

 

                                              Mixed hyperlipidemia              

                       

ICD-9: 272.4

ICD-10: E78.2                   Active                    2017            

 

                                        Unknown                

 

                                                            Other intervertebral

 disc degeneration, lumbar region       

                                        ICD-9: 722.52

ICD-10: M51.36                   Active                    2017           

 

                                        Unknown                

 

                                              Pain in thoracic spine            

                         

ICD-9: 724.1

ICD-10: M54.6                   Active                    2017            

 

                                        Unknown                

 

                                              Mastodynia                        

             ICD-9: 611.71

ICD-10: N64.4                   Active                    2017            

 

                                        Unknown                

 

                                                            Acute upper respirat

ory infection, unspecified              

                                        ICD-9: 465.9

ICD-10: J06.9                   Active                    2017            

 

                                        Unknown                

 

                                                            Acute mastoiditis wi

thout complications, right ear          

                                        ICD-9: 383.00

ICD-10: H70.001                   Active                    2016          

 

                                        Unknown                

 

                                                            Constipation, unspec

ified                                   

                                        ICD-9: 564.00

ICD-10: K59.00                   Active                    2016           

 

                                        Unknown                

 

                                                            Chronic kidney disea

se, stage 1                             

                                        ICD-9: 585.1

ICD-10: N18.1                   Active                    03/15/2016            

 

                                        Unknown                

 

                                              History of falling                

                     ICD-

9: V15.88

ICD-10: Z91.81                   Active                    12/15/2015           

 

                                        Unknown                

 

                                                            Muscle weakness (gen

eralized)                               

                                        ICD-9: 780.79

ICD-10: M62.81                   Active                    2015           

 

                                        Unknown                

 

                                              Acute renal failure               

                      ICD-

9: 584.9                   Active                    2015                 

 

                                        Unknown                

 

                                              POLYURIA                          

           ICD-9: 788.42  

                    Active                   2015                   Unknow

n   

            

 

                                              CAD                               

      ICD-9: 414.00       

                    Active                   09/10/2015                   Unknow

n        

       

 

                                              Hyperglycemia                     

                ICD-9: 

790.29                   Active                   2012                   

Unknown                

 

                                              HYPERLIPIDEMIA NEC/NOS            

                         

ICD-9: 272.4                   Active                    09/10/2015             

 

                                        Unknown                

 

                                              ABDOMINAL PAIN                    

                 ICD-9: 

789.00                   Active                   10/10/2012                   

Unknown                

 

                                              Grieving                          

           ICD-9: 309.0   

                    Active                   2015                   Unknow

n    

           

 

                                              HYPOGLYCEMIA                      

               ICD-9: 

251.2                   Active                   2012                   

Unknown                

 

                                              MALAISE AND FATIGUE               

                      ICD-

9: 780.79                   Active                    2015                

 

                                        Unknown                

 

                                              CERUMEN IMPACTION                 

                    ICD-9:

380.4                   Active                   2015                   

Unknown                

 

                                              Leg pain                          

           ICD-9: 729.5   

                    Active                   2015                   Unknow

n    

           

 

                                              SUPERFIC PHLEBITIS-LEG            

                         

ICD-9: 451.0                   Active                    2015             

 

                                        Unknown                

 

                                              SPASM OF MUSCLE                   

                  ICD-9: 

728.85                   Active                   2015                   

Unknown                

 

                                              Thoracic back pain                

                     ICD-

9: 724.1                   Active                    2015                 

 

                                        Unknown                

 

                                                            Benign positional ve

rtigo                                   

                    ICD-9: 386.11                   Active                               

                                        Unknown                

 

                                              Suspicious nevus                  

                   ICD-9: 

238.2                   Active                   2015                   

Unknown                

 

                                                            EUSTACHIAN TUBE DYSF

UNCTION                                 

                    ICD-9: 381.81                   Active                             

                                        Unknown                

 

                                              SINUSITIS, ACUTE                  

                   ICD-9: 

461.9                   Active                   2014                   

Unknown                

 

                                              DIZZINESS/VERTIGO                 

                    ICD-9:

780.4                   Active                   2014                   

Unknown                

 

                                              HYPERTENSION                      

               ICD-9: 

401.9                   Active                   2014                   

Unknown                

 

                                              URI, ACUTE                        

             ICD-9: 465.9 

                    Active                   10/15/2013                   Unknow

n  

             

 

                                              CEPHALGIA                         

            ICD-9: 784.0  

                    Active                   2013                   Unknow

n   

            

 

                                              OTITIS MEDIA NOS                  

                   ICD-9: 

382.9                   Active                   2013                   

Unknown                

 

                                              Perforation of ear drum           

                          

ICD-9: 384.20                   Active                    05/10/2013            

 

                                        Unknown                

 

                                              Mastalgia                         

            ICD-9: 611.71 

                    Active                   2013                   Unknow

n  

             

 

                                              DYSPEPSIA                         

            ICD-9: 536.8  

                    Active                   10/10/2012                   Unknow

n   

            

 

                                              IBS                               

      ICD-9: 564.1        

                    Active                   10/10/2012                   Unknow

n         

      

 

                                              FLU VACCINE                       

              ICD-9: 

V04.81                   Active                   2012                   

Unknown                

 

                                              Radiculopathy of leg              

                       

ICD-9: 724.4                   Active                    2012             

 

                                        Unknown                

 

                                              SCIATICA                          

           ICD-9: 724.3   

                    Active                   2012                   Unknow

n    

           

 

                                                            Lumbar degenerative 

disc disease                            

                    ICD-9: 722.52                   Active                        

                                        Unknown                

 

                                              Sacroiliac dysfunction            

                         

ICD-9: 739.4                   Active                    2012             

 

                                        Unknown                

 

                                              Hypertension                      

               Unknown    

                    Active                   2012                   Unknow

n     

          

 

                                              PAIN, LOWER BACK                  

                   ICD-9: 

724.2                   Active                   2011                   

Unknown                

 

                                              SPINAL ENTHESOPATHY               

                      ICD-

9: 720.1                   Active                    2011                 

 

                                        Unknown                

 

                                              Hypotension                       

              ICD-9: 458.9

                    Active                   2011                   Unknow

n 

              

 

                                              SACROILIITIS NEC                  

                   ICD-9: 

720.2                   Active                   2011                   

Unknown                

 

                                              PHARYNGITIS, ACUTE                

                     ICD-

9: 462                   Active                   2010                   

Unknown                

 

                                              URINARY TRACT INFECTION           

                          

ICD-9: 599.0                   Active                    2010             

 

                                        Unknown                

 

                                              Heat exhaustion                   

                  ICD-9: 

992.5                   Active                   2010                   

Unknown                

 

                                              Esophagitis                       

              ICD-9: 

530.10                   Active                   2010                   

Unknown                

 

                                              Gastritis                         

            ICD-9: 535.50 

                    Active                   2010                   Unknow

n  

             

 

                                              GERD                              

       ICD-9: 530.81      

                    Active                   2010                   Unknow

n       

        

 

                                              Hiatal hernia                     

                ICD-9: 

553.3                   Active                   2010                   

Unknown                

 

                                              B12 DEFIC ANEMIA NEC              

                       

ICD-9: 281.1                   Active                    2010             

 

                                        Unknown                

 

                                              Anxiety                           

          Unknown         

                    Active                   2010                   Unknow

n          

     

 

                                              Heart disease                     

                Unknown   

                    Active                   2010                   Unknow

n    

           

 

                                              Hyperlipidemia                    

                 Unknown  

                    Active                   2010                   Unknow

n   

            

 

                                              ANXIETY STATE NOS                 

                    ICD-9:

300.00                   Active                   2010                   

Unknown                

 

                                              DEPRESSIVE DISORDER NEC           

                          

ICD-9: 311                   Active                    2010               

 

                                        Unknown                



                                                                                
                                                                                
                                                                                
                                                                                
                                                                                
                                                                                
                                                                                
                                                                                
                                                                                
                                                                                
                                                                                
                           



Problems

                      



                    Condition                   Codes                   Effectiv

e Dates             

                                        Condition Status                

 

                                                            Acute serous otitis 

media, left ear                         

                                        ICD-9: 381.01

ICD-10: H65.02                   2019                   Active            

   

 

                                                            Coronary atheroscler

osis due to calcified coronary lesion   

                                        ICD-9: 414.00

ICD-10: I25.84                   2018                   Active            

   

 

                                                            Essential (primary) 

hypertension                            

                                        ICD-9: 401.9

ICD-10: I10                   2014                   Active               



 

                                                            Hypoglycemia, unspec

ified                                   

                                        ICD-9: 251.2

ICD-10: E16.2                   2017                   Active             

  

 

                                              Other fatigue                     

                ICD-9: 

780.79

ICD-10: R53.83                   2017                   Active            

   

 

                                                            Urinary tract infect

ion, site not specified                 

                                        ICD-9: 599.0

ICD-10: N39.0                   10/02/2018                   Active             

  

 

                                                            Gastro-esophageal re

flux disease without esophagitis        

                                        ICD-9: 530.81

ICD-10: K21.9                   2018                   Active             

  

 

                                              Epigastric pain                   

                  ICD-9: 

789.06

ICD-10: R10.13                   2018                   Active            

   

 

                                                            Atherosclerotic hear

t disease of native coronary artery 

without angina pectoris                                     ICD-9: 414.00

ICD-10: I25.10                   2018                   Active            

   

 

                                                            Generalized anxiety 

disorder                                

                                        ICD-9: 300.00

ICD-10: F41.1                   2018                   Active             

  

 

                                              Palpitations                      

               ICD-9: 

785.1

ICD-10: R00.2                   10/02/2018                   Active             

  

 

                                              Dizziness and giddiness           

                          

ICD-9: 780.4

ICD-10: R42                   2014                   Active               



 

                                                            Occlusion and stenos

is of bilateral carotid arteries        

                                        ICD-9: 433.10

ICD-10: I65.23                   2018                   Active            

   

 

                                              FLU VACCINE                       

              ICD-9: 

V04.81

ICD-10: Z23                   2012                   Active               



 

                                              Cervicalgia                       

              ICD-9: 723.1

ICD-10: M54.2                   2018                   Active             

  

 

                                                            Other spondylosis wi

th radiculopathy, cervical region       

                                        ICD-9: 721.0

ICD-10: M47.22                   2018                   Active            

   

 

                                              Cervicocranial syndrome           

                          

ICD-9: 723.2

ICD-10: M53.0                   2018                   Active             

  

 

                                                            Vertigo of central o

rigin, bilateral                        

                                        ICD-9: 386.2

ICD-10: H81.43                   2018                   Active            

   

 

                                                            Benign paroxysmal ve

rtigo, bilateral                        

                                        ICD-9: 386.11

ICD-10: H81.13                   2018                   Active            

   

 

                                              Otalgia, bilateral                

                     ICD-

9: 388.70

ICD-10: H92.03                   2018                   Active            

   

 

                                                            Left lower quadrant 

pain                                    

                                        ICD-9: 789.04

ICD-10: R10.32                   2017                   Active            

   

 

                                              Low back pain                     

                ICD-9: 

724.2

ICD-10: M54.5                   2017                   Active             

  

 

                                                            Radiculopathy, lumbo

sacral region                           

                                        ICD-9: 724.4

ICD-10: M54.17                   2018                   Active            

   

 

                                                            Impaired fasting glu

cose                                    

                                        ICD-9: 790.29

ICD-10: R73.01                   2012                   Active            

   

 

                                              Mixed hyperlipidemia              

                       

ICD-9: 272.4

ICD-10: E78.2                   2017                   Active             

  

 

                                                            Other intervertebral

 disc degeneration, lumbar region       

                                        ICD-9: 722.52

ICD-10: M51.36                   2017                   Active            

   

 

                                              Pain in thoracic spine            

                         

ICD-9: 724.1

ICD-10: M54.6                   2017                   Active             

  

 

                                              Mastodynia                        

             ICD-9: 611.71

ICD-10: N64.4                   2017                   Active             

  

 

                                                            Acute upper respirat

ory infection, unspecified              

                                        ICD-9: 465.9

ICD-10: J06.9                   2017                   Active             

  

 

                                                            Acute mastoiditis wi

thout complications, right ear          

                                        ICD-9: 383.00

ICD-10: H70.001                   2016                   Active           

    

 

                                                            Constipation, unspec

ified                                   

                                        ICD-9: 564.00

ICD-10: K59.00                   2016                   Active            

   

 

                                                            Chronic kidney disea

se, stage 1                             

                                        ICD-9: 585.1

ICD-10: N18.1                   03/15/2016                   Active             

  

 

                                              History of falling                

                     ICD-

9: V15.88

ICD-10: Z91.81                   12/15/2015                   Active            

   

 

                                                            Muscle weakness (gen

eralized)                               

                                        ICD-9: 780.79

ICD-10: M62.81                   2015                   Active            

   

 

                                              Acute renal failure               

                      ICD-

9: 584.9                   2015                   Active                

 

                                              POLYURIA                          

           ICD-9: 788.42  

                          2015                   Active                

 

                                              CAD                               

      ICD-9: 414.00       

                          09/10/2015                   Active                

 

                                              Hyperglycemia                     

                ICD-9: 

790.29                    2012                   Active                

 

                                              HYPERLIPIDEMIA NEC/NOS            

                         

ICD-9: 272.4                   09/10/2015                   Active              

 

 

                                              ABDOMINAL PAIN                    

                 ICD-9: 

789.00                    10/10/2012                   Active                

 

                                              Grieving                          

           ICD-9: 309.0   

                          2015                   Active                

 

                                              HYPOGLYCEMIA                      

               ICD-9: 

251.2                     2012                   Active                

 

                                              MALAISE AND FATIGUE               

                      ICD-

9: 780.79                   2015                   Active                

 

                                              CERUMEN IMPACTION                 

                    ICD-9:

380.4                     2015                   Active                

 

                                              Leg pain                          

           ICD-9: 729.5   

                          2015                   Active                

 

                                              SUPERFIC PHLEBITIS-LEG            

                         

ICD-9: 451.0                   2015                   Active              

 

 

                                              SPASM OF MUSCLE                   

                  ICD-9: 

728.85                    2015                   Active                

 

                                              Thoracic back pain                

                     ICD-

9: 724.1                   2015                   Active                

 

                                                            Benign positional ve

rtigo                                   

                    ICD-9: 386.11                   2015                  

 Active            

   

 

                                              Suspicious nevus                  

                   ICD-9: 

238.2                     2015                   Active                

 

                                                            EUSTACHIAN TUBE DYSF

UNCTION                                 

                    ICD-9: 381.81                   2014                  

 Active          

     

 

                                              SINUSITIS, ACUTE                  

                   ICD-9: 

461.9                     2014                   Active                

 

                                              DIZZINESS/VERTIGO                 

                    ICD-9:

780.4                     2014                   Active                

 

                                              HYPERTENSION                      

               ICD-9: 

401.9                     2014                   Active                

 

                                              URI, ACUTE                        

             ICD-9: 465.9 

                          10/15/2013                   Active                

 

                                              CEPHALGIA                         

            ICD-9: 784.0  

                          2013                   Active                

 

                                              OTITIS MEDIA NOS                  

                   ICD-9: 

382.9                     2013                   Active                

 

                                              Perforation of ear drum           

                          

ICD-9: 384.20                   05/10/2013                   Active             

  

 

                                              Mastalgia                         

            ICD-9: 611.71 

                          2013                   Active                

 

                                              DYSPEPSIA                         

            ICD-9: 536.8  

                          10/10/2012                   Active                

 

                                              IBS                               

      ICD-9: 564.1        

                          10/10/2012                   Active                

 

                                              FLU VACCINE                       

              ICD-9: 

V04.81                    2012                   Active                

 

                                              Radiculopathy of leg              

                       

ICD-9: 724.4                   2012                   Active              

 

 

                                              SCIATICA                          

           ICD-9: 724.3   

                          2012                   Active                

 

                                                            Lumbar degenerative 

disc disease                            

                    ICD-9: 722.52                   2012                  

 Active     

          

 

                                              Sacroiliac dysfunction            

                         

ICD-9: 739.4                   2012                   Active              

 

 

                                              Hypertension                      

               Unknown    

                          2012                   Active                

 

                                              PAIN, LOWER BACK                  

                   ICD-9: 

724.2                     2011                   Active                

 

                                              SPINAL ENTHESOPATHY               

                      ICD-

9: 720.1                   2011                   Active                

 

                                              Hypotension                       

              ICD-9: 458.9

                          2011                   Active                

 

                                              SACROILIITIS NEC                  

                   ICD-9: 

720.2                     2011                   Active                

 

                                              PHARYNGITIS, ACUTE                

                     ICD-

9: 462                    2010                   Active                

 

                                              URINARY TRACT INFECTION           

                          

ICD-9: 599.0                   2010                   Active              

 

 

                                              Heat exhaustion                   

                  ICD-9: 

992.5                     2010                   Active                

 

                                              Esophagitis                       

              ICD-9: 

530.10                    2010                   Active                

 

                                              Gastritis                         

            ICD-9: 535.50 

                          2010                   Active                

 

                                              GERD                              

       ICD-9: 530.81      

                          2010                   Active                

 

                                              Hiatal hernia                     

                ICD-9: 

553.3                     2010                   Active                

 

                                              B12 DEFIC ANEMIA NEC              

                       

ICD-9: 281.1                   2010                   Active              

 

 

                                              Anxiety                           

          Unknown         

                          2010                   Active                

 

                                              Heart disease                     

                Unknown   

                          2010                   Active                

 

                                              Hyperlipidemia                    

                 Unknown  

                          2010                   Active                

 

                                              ANXIETY STATE NOS                 

                    ICD-9:

300.00                    2010                   Active                

 

                                              DEPRESSIVE DISORDER NEC           

                          

ICD-9: 311                   2010                   Active                



                                                                                
                                                                                
                                                                                
                                                                                
                                                                                
                                                                                
                                                                                
                                                                                
                                                                                
                                                                                
                                                                                
                           



Medications

                      



                    Medication                   Codes                   Instruc

tions               

                    Start Date                   Stop Date                   Sta

tus              

                                        Fill Instructions                

 

                                                            metoprolol tartrate 

25 mg tablet                            

                    RxNorm: 422071                   1 Tablet(s) PO BID         

          

2019                   Active                

                                                        

 

                                                            omeprazole 40 mg cap

alicia,delayed release                    

                          RxNorm: 219956                   1 Capsule(s) PO QD   

          

                    2019                   10/08/2019                   Ac

tive           

                                                        

 

                                                            Flonase Allergy Reli

ef 50 mcg/actuation nasal 

spray,suspension                                     RxNorm: 9956033            

                    2 [facility] NASAL QHS                   2019              

     No Stop 

Date                      Active                                    

 

                                                            simvastatin 40 mg ta

blet                                    

                    RxNorm: 356197                   1 Tablet(s) PO QD          

         2019                   Active                         

   

      

 

                                                            simvastatin 40 mg ta

blet                                    

                    RxNorm: 476298                   1 Tablet(s) PO QD          

         2019                   Inactive                       

   

        

 

                                                            omeprazole 40 mg cap

alicia,delayed release                    

                          RxNorm: 473890                   1 Capsule(s) PO QD   

          

                    2019                   In

active         

                                                        

 

                                                            simvastatin 40 mg ta

blet                                    

                    RxNorm: 223141                   1 Tablet(s) PO QD          

         2019                   Inactive                       

   

        

 

                                                            omeprazole 40 mg cap

alicia,delayed release                    

                          RxNorm: 316202                   1 Capsule(s) PO QD   

          

                    2019                   In

active         

                                                        

 

                                                            Bactrim  mg-16

0 mg tablet                             

                    RxNorm: 529654                   1 Tablet(s) PO BID         

          

2019                   Inactive              

                                                        

 

                                                            Bactrim  mg-16

0 mg tablet                             

                    RxNorm: 900195                   1 Tablet(s) PO BID         

          

2019                   Inactive              

                                                        

 

                                              Macrobid 100 mg capsule           

                          

RxNorm: 524596                   1 Capsule(s) PO BID                   

2019                   Inactive              

                                                        

 

                                                            metoprolol tartrate 

25 mg tablet                            

                    RxNorm: 012198                   1 Tablet(s) PO BID         

          

2019                   Inactive              

                                                        

 

                                              Xanax 0.25 mg tablet              

                       

RxNorm: 146817                          TAKE 1 TABLET BY MOUTH TWICE DAILY      

       

                    2018                   No Stop Date                   

Active         

                                                        

 

                                              Xanax 0.25 mg tablet              

                       

RxNorm: 664200                   1 Tablet(s) PO BID                   2018                   Inactive                       

  

         

 

                                                            Protonix 40 mg table

t,delayed release                       

                          RxNorm: 701202                   1 Tablet(s) PO BID fo

r 

stomach--replaces omeprazole                   2018                   Inactive                                   

 

                                              Carafate 1 gram tablet            

                         

RxNorm: 901903                   1 Tablet(s) PO AC & HS                   

2018                   Inactive              

                                                        

 

                                              Xanax 0.25 mg tablet              

                       

RxNorm: 491890                   1 Tablet(s) PO BID                   10/30/2018

                    12/10/2018                   Inactive                       

  

         

 

                                                            Bactrim  mg-16

0 mg tablet                             

                    RxNorm: 539078                   1 Tablet(s) PO BID         

          

10/04/2018                   10/08/2018                   Inactive              

                                                        

 

                                                            Bactrim  mg-16

0 mg tablet                             

                    RxNorm: 476756                   1 Tablet(s) PO BID         

          

10/04/2018                   10/03/2018                   Inactive              

                                                        

 

                                              Carafate 1 gram tablet            

                         

RxNorm: 404661                   1 Tablet(s) PO AC & HS                   

2018                   10/17/2018                   Inactive              

                                                        

 

                                                            Protonix 40 mg table

t,delayed release                       

                          RxNorm: 900270                   1 Tablet(s) PO BID fo

r 

stomach--replaces omeprazole                   2018                   Inactive                                   

 

                                                            Protonix 40 mg table

t,delayed release                       

                          RxNorm: 904850                   1 Tablet(s) PO BID fo

r 

stomach--replaces omeprazole                   2018                   Inactive                                   

 

                                                            fluticasone 50 mcg/a

ctuation nasal spray,suspension         

                           RxNorm: 7897968                   2 Spray NASAL QD to

each nostril                   2018          

                          Inactive                                   

 

                                                            Nasonex 50 mcg/actua

tion Spray                              

                    RxNorm: 6018082                   2 Spray NASAL             

      

2018                   Inactive              

                                                        

 

                                                            omeprazole 40 mg cap

alicia,delayed release                    

                          RxNorm: 494705                   1 Capsule(s) PO QD   

          

                    2018                   08/15/2018                   In

active         

                                                        

 

                                                            simvastatin 40 mg ta

blet                                    

                          RxNorm: 352441                   1 Tablet(s) PO QD ROBLES

E 1 TABLET EVERY DAY      

                    2018                   In

active  

                                                        

 

                                                            metoprolol tartrate 

25 mg tablet                            

                    RxNorm: 052355                   1/2 Tablet(s) PO QD        

           

2018                   Inactive              

                                                        

 

                                                            simvastatin 40 mg ta

blet                                    

                          RxNorm: 614110                   Tablet(s) TAKE 1 TABL

ET EVERY DAY              

                    2017                   In

active          

                                                        

 

                                                            metoprolol tartrate 

25 mg tablet                            

                    RxNorm: 761039                   1/2 Tablet(s) PO QD        

           

2017                   Inactive              

                                                        

 

                                                            Flonase 50 mcg/actua

tion nasal spray,suspension             

                          RxNorm: 0186669                   2 Spray NASAL BID   

   

                    2017                   In

active  

                                                        

 

                                                            omeprazole 40 mg cap

alicia,delayed release                    

                          RxNorm: 036497                   1 Capsule(s) PO QD   

          

                    2017                   In

active         

                                                        

 

                                                            metoprolol tartrate 

25 mg tablet                            

                    RxNorm: 738605                   1/2 Tablet(s) PO QD        

           

04/10/2017                   2017                   Inactive              

                                                        

 

                                                            ciprofloxacin 0.2 % 

ear drops in a dropperette              

                          RxNorm: 911341                   4 Drop(s) OTIC TID fo

r 1 

week                      2016               

   

                          Inactive                                   

 

                                              cefdinir 300 mg capsule           

                          

RxNorm: 056417                   2 Capsule(s) PO QD                   2016                   Inactive                       

  

         

 

                                                            omeprazole 40 mg cap

alicia,delayed release                    

                          RxNorm: 044022                   TAKE 1 CAPSULE EVERY 

DAY       

                    2016                   In

active   

                                                        

 

                                                            simvastatin 40 mg ta

blet                                    

                    RxNorm: 435736                   TAKE 1 TABLET EVERY DAY    

               

2016                   Inactive              

                                                        

 

                                                            Flonase 50 mcg/actua

tion nasal spray,suspension             

                          RxNorm: 6802627                   2 Spray NASAL BID   

   

                    2015                   In

active  

                                                        

 

                                                            cefuroxime axetil 25

0 mg tablet                             

                    RxNorm: 297765                   1 Tablet(s) PO BID         

          

2015                   Inactive              

                                                        

 

                                                            cefuroxime axetil 25

0 mg tablet                             

                    RxNorm: 333202                   1 Tablet(s) PO BID         

          

2015                   Inactive              

                                                        

 

                                                            omeprazole 40 mg cap

alicia,delayed release                    

                          RxNorm: 019125                   1 Capsule(s) PO QD   

          

                    2015                   In

active         

                                                        

 

                                              meloxicam 7.5 mg tablet           

                          

RxNorm: 602832                   1 Tablet(s) PO QD                   2015                   Inactive                       

   

        

 

                                              loratadine 10 mg tablet           

                          

RxNorm: 350452                          1 Tablet(s) PO QAM for allergies        

       

                    2014                   In

active           

                                                        

 

                                                            Flonase 50 mcg/actua

tion nasal spray,suspension             

                          RxNorm: 241326                   2 Spray NASAL BID    

   

                    2014                   12/15/2015                   In

active   

                                                        

 

                                              prednisone 20 mg tablet           

                          

RxNorm: 064358                   2 Tablet(s) PO BID                   2014                   Inactive                       

  

         

 

                                                            Dyazide 37.5 mg-25 m

g capsule                               

                    RxNorm: 776409                   1 Capsule(s) PO QAM        

           

2014                   Inactive              

                                                        

 

                                              Ceftin 500 mg tablet              

                       

RxNorm: 993828                   1 Tablet(s) PO BID                   2014                   Inactive                       

  

         

 

                                              Ceftin 500 mg tablet              

                       

RxNorm: 698760                   1 Tablet(s) PO BID                   2014                   Inactive                       

  

         

 

                                                            simvastatin 40 mg ta

blet                                    

                          RxNorm: 629069                   Tablet(s) PO TAKE ONE

 TABLET BY MOUTH EVERY DAY

                    2014                   

Inactive                                                

 

                                                            metoprolol tartrate 

25 mg tablet                            

                          RxNorm: 899742                   Tablet(s) PO TAKE ONE

-HALF TABLET BY 

MOUTH EVERY DAY                   2014       

                          Inactive                                   

 

                                              Ceftin 500 mg tablet              

                       

RxNorm: 785563                   1 Tablet(s) PO BID                   10/15/2013

                    10/24/2013                   Inactive                       

  

         

 

                                              loratadine 10 mg tablet           

                          

RxNorm: 649580                          1 Tablet(s) PO QAM for allergies        

       

                    2013                   In

active           

                                                        

 

                                                            omeprazole 20 mg cap

alicia,delayed release                    

                          RxNorm: 051081                   Capsule(s) PO TAKE ON

E CAPSULE 

BY MOUTH TWICE DAILY                   2013  

                          Inactive                                   

 

                                                            omeprazole 20 mg cap

alicia,delayed release                    

                          RxNorm: 338769                   1 Capsule(s) PO BID  

          

                    2013                   In

active        

                                                        

 

                                                            Augmentin 875 mg-125

 mg tablet                              

                    RxNorm: 143838                   1 Tablet(s) PO Q12H        

           

05/10/2013                   2013                   Inactive              

                                                        

 

                                                            Floxin Otic Drops 1 

bottle Drops                            

                    RxNorm:                    5 Drop(s) OTIC BID               

    

05/10/2013                   2013                   Inactive              

                                                        

 

                                                            metoprolol tartrate 

25 mg tablet                            

                          RxNorm: 922855                   Tablet(s) PO TAKE ONE

-HALF TABLET BY 

MOUTH EVERY DAY                   2013       

                          Inactive                                   

 

                                                            simvastatin 40 mg ta

blet                                    

                          RxNorm: 412236                   Tablet(s) PO TAKE ONE

 TABLET BY MOUTH EVERY DAY

                    2013                   

Inactive                                                

 

                                              lancets                           

          RxNorm:         

                                        Misc Miscellaneous USE ONE TO CHECK GLUC

OSE EVERY DAY                 

                    2013                   In

active             

                                                        

 

                                              Carafate 1 gram tablet            

                         

RxNorm: 309781                   1 Tablet(s) PO AC & HS                   

2012                   Inactive              

                                                        

 

                                              Flagyl 500 mg tablet              

                       

RxNorm: 535184                   1 Tablet(s) PO TID                   10/10/2012

                    10/16/2012                   Inactive                       

  

         

 

                                              Carafate 1 gram tablet            

                         

RxNorm: 587620                   1 Tablet(s) PO AC & HS                   

10/10/2012                   2012                   Inactive              

                                                        

 

                                              One Touch Test strips             

                        

RxNorm:                    Miscellaneous QD                   2012                   Inactive                   one 

touch 

ultra mini test strips                

 

                                              Protonix 40 mg Tab                

                     

RxNorm: 826596                   1 Tablet(s) PO QD                   2012                   Inactive                       

   

        

 

                                              Protonix 40 mg Tab                

                     

RxNorm: 705823                   1 Tablet(s) PO QD                   2012 

                    10/09/2012                   Inactive                       

   

        

 

                                              prednisone 20 mg Tab              

                       

RxNorm: 120672                   1 Tablet(s) PO BID                   2012                   Inactive                       

  

         

 

                                              Flexeril 5 mg Tab                 

                    

RxNorm: 972926                   1 Tablet(s) PO QHS for spasm                   

2012                   Inactive              

                                                        

 

                                              Flexeril 5 mg Tab                 

                    

RxNorm: 155354                   1 Tablet(s) PO QHS for spasm                   

2012                   Inactive              

                                                        

 

                                              amlodipine 2.5 mg Tab             

                        

RxNorm: 796534                          1/2 Tablet(s) PO QD replaces 5mg dose   

       

                    2012                   In

active      

                                                        

 

                                                            simvastatin 40 mg ta

blet                                    

                    RxNorm: 012969                   1 Tablet(s) PO QD          

         2012                   Inactive                       

   

        

 

                                                            metoprolol tartrate 

25 mg tablet                            

                    RxNorm: 840104                   1/2 Tablet(s) PO QD        

           

2012                   Inactive              

                                                        

 

                                                            omeprazole 20 mg cap

alicia,delayed release                    

                          RxNorm: 717380                   1 Capsule(s) PO BID  

          

                    2012                   In

active        

                                                        

 

                                              cefdinir 300 mg Cap               

                      

RxNorm: 861516                   2 Capsule(s) PO QD                   2011                   Inactive                       

  

         

 

                                              simvastatin 40 mg Tab             

                        

RxNorm: 430010                   1 Tablet(s) PO QD                   2011                   Inactive                       

   

        

 

                                                            metoprolol tartrate 

25 mg Tab                               

                    RxNorm: 764131                   1/2 Tablet(s) PO QD        

           

10/25/2011                   2012                   Inactive              

                                                        

 

                                                            metoprolol tartrate 

25 mg Tab                               

                    RxNorm: 038945                   1/2 Tablet(s) PO QD        

           

10/24/2011                   10/24/2011                   Inactive              

                                                        

 

                                              meclizine 25 mg Tab               

                      

RxNorm: 1370973                   1 Tablet(s) PO QHS                   

2011                   Inactive              

                                        for dizziness                

 

                                              Ceftin 500 mg Tab                 

                    

RxNorm: 545074                   1 Tablet(s) PO BID                   2011                   Inactive                       

  

         

 

                                                            omeprazole 20 mg Cap

, Delayed Release                       

                          RxNorm: 124568                   1 Capsule(s) PO BID  

             

                    2011                   10/24/2011                   In

active           

                                                        

 

                                              simvastatin 40 mg Tab             

                        

RxNorm: 378942                   1 Tablet(s) PO QD                   2011                   Inactive                       

   

        

 

                                              simvastatin 40 mg Tab             

                        

RxNorm: 316253                   1 Tablet(s) PO QD                   2011                   Inactive                       

   

        

 

                                              simvastatin 40 mg Tab             

                        

RxNorm: 706456                   1 Tablet(s) PO QD                   2010                   Inactive                       

   

        

 

                                                            Tessalon Perles 100 

mg Cap                                  

                    RxNorm: 441582                   1 Capsule(s) PO Q6-8H      

             

2010                   Inactive              

                                                        

 

                                              cefdinir 300 mg Cap               

                      

RxNorm: 051873                   1 Capsule(s) PO BID                   

2010                   Inactive              

                                                        

 

                                              Lexapro 10 mg Tab                 

                    

RxNorm: 640535                   1 Tablet(s) PO QD                   2010                   Inactive                       

   

        

 

                                              Cipro 250 mg Tab                  

                   RxNorm:

659123                    1 Tablet(s) PO BID                   2010       

 

                    10/04/2010                   Inactive                       

          

 

 

                                                            Wellbutrin  mg

 24 hr Tab                              

                    RxNorm: 686334                   1 Tablet(s) PO QAM         

          

2010                   Inactive              

                                                        

 

                                              Fish Oil 1,000 mg Cap             

                        

RxNorm:                    1 Capsule(s) PO QD                   No Start Date   

                                        Active                                  

 

 

                                                            Tylenol Extra Streng

th 500 mg tablet                        

                          RxNorm: 550249                   1/2 Tablet(s) PO as n

eeded         

                    No Start Date                                       Active  

           

                                                        

 

                                                            nitroglycerin 0.4 mg

 sublingual tablet                      

                          RxNorm: 000466                   Tablet(s) SL as neede

d           

                    No Start Date                                       Active  

             

                                                        

 

                                                            Calcium with Vitamin

 D 600 mg (1,500 mg)-400 unit tablet    

                                RxNorm: 237243                   1 Tablet(s) PO 

QD                   No Start Date                                       Active 

                                                        

 

                                                            Multivitamin & Lander

al Formula Tab                          

                    RxNorm:                    1 Tablet(s) PO QD                

   No 

Start Date                                       Active                         

         

 

                                                            vitamin B complex ca

psule                                   

                    RxNorm:                    1 Capsule(s) PO QD               

    No Start Date  

                                        Active                                  

 

 

                                              Aspirin 81 mg Tab                 

                    

RxNorm: 686250                   1 Tablet(s) PO QD                   No Start 

Date                                       Active                               

   

 

                                                            isosorbide mononitra

te ER 30 mg tablet,extended release 24 

hr                                     RxNorm: 023462                   1 

Tablet(s) PO QHS                   No Start Date                                

                          Active                                    

 

                                                            Vitamin D 1,000 unit

 Cap                                    

                    RxNorm: 070006                   1 Capsule(s) PO QD         

          No Start 

Date                                       Active                               

   

 

                                              Co Q-10 oral                      

               RxNorm: 

62502                   oral                   No Start Date                    

                          Active                                    

 

                                                            metoprolol tartrate 

25 mg Tab                               

                    RxNorm: 681673                   1/2 Tablet(s) PO QD        

           No 

Start Date                   10/23/2011                   Inactive              

                                                        

 

                                                            Nasonex 50 mcg/actua

tion Spray                              

                    RxNorm: 3169741                   2 Spray NASAL             

      No Start

Date                   2018                   Inactive                    

              

 

                                                            Vitamin B12 1000mcg 

Tablet                                  

                    RxNorm:                    1 Tablet(s) PO QD                

   No Start Date  

                    2015                   Inactive                       

    

       

 

                                              amlodipine 5 mg Tab               

                      

RxNorm: 729393                   1 Tablet(s) PO QD                   No Start 

Date                   2012                   Inactive                    

              

 

                                                            simvastatin 40 mg ta

blet                                    

                    RxNorm: 103205                   1 Tablet(s) PO QD          

         No Start 

Date                   2019                   Inactive                    

              

 

                                                            omeprazole 20 mg cap

alicia,delayed release                    

                          RxNorm: 703640                   1 Capsule(s) PO BID  

          

                    No Start Date                   2013                  

 Inactive     

                                                        

 

                                                            omeprazole 40 mg cap

alicia,delayed release                    

                          RxNorm: 387322                   1 Capsule(s) PO QD   

          

                    No Start Date                   2019                  

 Inactive      

                                                        

 

                                              Nexium 40 mg Cap                  

                   RxNorm:

672115                    1 Capsule(s) PO QD                   No Start Date    

 

                    2010                   Inactive                       

       

    

 

                                                            Stool Softener 100 m

g Tab                                   

                    RxNorm: 8398591                   2 Tablet(s) PO BID        

           No Start

Date                   2012                   Inactive                    

              

 

                                                            Calcium with Vitamin

 D 600 mg (1,500 mg)-400 unit Tab       

                             RxNorm: 330079                   1 Tablet(s) PO QD 

                    No Start Date                   2015                  

 

Inactive                                                

 

                                                            iron 325 mg (65 mg i

naun) Tab                                

                    RxNorm: 997727                   1 Tablet(s) PO QD          

         No 

Start Date                   2015                   Inactive              

                                                        

 

                                                            Flonase 50 mcg/actua

tion Nasal Spray                        

                          RxNorm: 097542                   2 Spray NASAL BID    

              

                    No Start Date                   2014                  

 Inactive           

                                                        

 

                                                            fluticasone 50 mcg/a

ctuation nasal spray,suspension         

                           RxNorm: 2091766                   2 Spray NASAL QD to

each nostril                   No Start Date                   2018       

                          Inactive                                   

 

                                                            Nasonex 50 mcg/actua

tion Spray                              

                    RxNorm: 7786166                   2 Spray NASAL QD          

         No 

Start Date                   2018                   Inactive              

                                                        

 

                                                            hydralazine 50 mg ta

blet                                    

                          RxNorm: 099618                   1 Tablet(s) PO as nee

ded for BP over 160/90    

                    No Start Date                   2018                  

 

Inactive                                                

 

                                                            Vimovo 500 mg-20 mg 

12 hr Tab                               

                    RxNorm: 235555                   1 Tablet(s) PO BID         

          No 

Start Date                   2011                   Inactive              

                                                        

 

                                                            Tylenol PM 25 mg-500

 mg/15 mL Oral Soln                     

                    RxNorm: 5134465                   1 PO QPM                  

 No 

Start Date                   2015                   Inactive              

                                                        

 

                                                            Iron (Ferrous Sulfat

e) Oral                                 

                    RxNorm:                    Oral                   No Start D

ate              

                    2012                   Inactive                       

            

 

                                              Reglan 10 mg Tab                  

                   RxNorm:

407957                    1 Tablet(s) PO TID before meals                   No 

Start Date                   2011                   Inactive              

                                                        

 

                                              Xanax 1 mg Tab                    

                 RxNorm: 

793600                    1/2 Tablet(s) PO QD                   No Start Date   

 

                    2018                   Inactive                       

      

     

 

                                              amlodipine 10 mg tablet           

                          

RxNorm: 567113                   1 Tablet(s) PO QD                   No Start 

Date                   2014                   Inactive                    

              

 

                                              Iron (dried) Oral                 

                    

RxNorm:                    Oral                      No Start Date              

   

                    2012                   Inactive                       

            

 

                                                            metoprolol tartrate 

50 mg tablet                            

                    RxNorm: 903976                   1 Tablet(s) PO BID         

          No

Start Date                   12/10/2018                   Inactive              

                                                        

 

                                              Xanax 0.25 mg tablet              

                       

RxNorm: 266937                   1 Tablet(s) PO BID                   No Start 

Date                   10/29/2018                   Inactive                    

              

 

                                              lancets                           

          RxNorm:         

                          Miscellaneous check blood sugar at least once daily   

                

No Start Date                   2013                   Inactive           

                                                        

 

                                              simvastatin 40 mg Tab             

                        

RxNorm: 670430                   1 Tablet(s) PO QD                   No Start 

Date                   2010                   Inactive                    

              

 

                                                            isosorbide mononitra

te ER 30 mg tablet,extended release 24 

hr                                     RxNorm: 100000                   1 

Tablet(s) PO QHS                   No Start Date                   2019   

                          Inactive                                   

 

                                                            amlodipine 2.5 mg ta

blet                                    

                    RxNorm: 723490                   1 Tablet(s) PO QD          

         No Start 

Date                   2014                   Inactive                    

              

 

                                                            sucralfate 1 gram ta

blet                                    

                    RxNorm: 993362                   1 Tablet(s) PO QID         

          No Start 

Date                   2019                   Inactive                    

              

 

                                              Fish Oil Oral                     

                RxNorm:   

                    Oral                   No Start Date                   

2012                   Inactive                                   

 

                                              Multiple Vitamin Oral             

                        

RxNorm:                    Oral                      No Start Date              

   

                    2012                   Inactive                       

            

 

                                                            metoprolol tartrate 

25 mg tablet                            

                    RxNorm: 670052                   1/2 Tablet(s) PO QD        

           

No Start Date                   2017                   Inactive           

                                                        

 

                                                            metoprolol tartrate 

50 mg tablet                            

                    RxNorm: 284905                   1/2 Tablet(s) PO BID       

            

No Start Date                   2019                   Inactive           

                                                        

 

                                                            Flonase Allergy Reli

ef 50 mcg/actuation nasal 

spray,suspension                                     RxNorm: 6495052            

                    2 [place]ance NASAL QHS                   No Start Date           

        

2019                   Inactive                                   



                                                                                
                                                                                
                                                                                
                                                                                
                                                                                
                                                                                
                                                                                
                                                                                
                                                                                
                                                                                
                                                                                
                                                                                
                                                                                
                                                                                
                                                                                
                                                                                
                                                                        



Medication Administered

          No Medication Administered data                                       
                            



Immunizations

                      



                Vaccine                   Codes                   Date          

         Status 

              

 

                    Influenza                   CVX: 135                                  

                                        completed                

 

                    Influenza                   CVX: 135                   10/06

/2017               

                                        completed                

 

                    Influenza                   CVX: 135                   10/07

/2016               

                                        completed                

 

                    Influenza                   CVX: 135                   10/16

/2015               

                                        completed                

 

                    Influenza                   CVX: 141                                  

                                        completed                

 

                    Influenza (Adult)                   CVX: 141                

   10/06/2010       

                                        completed                



                                                                                
                                               



Assessments

                      



                    Condition                   Codes                   Effectiv

e Dates             

  

 

                          Acute serous otitis media, left ear                   

ICD-10: H65.02

ICD-9: 381.01                           2019                

 

                          Urinary tract infection, site not specified           

        ICD-10: N39.0

ICD-9: 599.0                            06/10/2019                

 

                          Other fatigue                   ICD-10: R53.83

ICD-9: 780.79                           06/10/2019                

 

                          Hypoglycemia, unspecified                   ICD-10: E1

6.2

ICD-9: 251.2                            06/10/2019                

 

                          Coronary atherosclerosis due to calcified coronary les

ion                   ICD-

10: I25.84

ICD-9: 414.00                           06/10/2019                

 

                          Essential (primary) hypertension                   ICD

-10: I10

ICD-9: 401.9                            06/10/2019                

 

                          Gastro-esophageal reflux disease without esophagitis  

                 ICD-10: 

K21.9

ICD-9: 530.81                           2019                

 

                          Epigastric pain                   ICD-10: R10.13

ICD-9: 789.06                           2018                

 

                          Generalized anxiety disorder                   ICD-10:

 F41.1

ICD-9: 300.00                           2018                

 

                                        Atherosclerotic heart disease of native 

coronary artery without angina pectoris 

                                        ICD-10: I25.10

ICD-9: 414.00                           2018                

 

                          Palpitations                   ICD-10: R00.2

ICD-9: 785.1                            10/02/2018                

 

                          Occlusion and stenosis of bilateral carotid arteries  

                 ICD-10: 

I65.23

ICD-9: 433.10                           2018                

 

                          Dizziness and giddiness                   ICD-10: R42

ICD-9: 780.4                            2018                

 

                          FLU VACCINE                   ICD-10: Z23

ICD-9: V04.81                           2018                

 

                          Cervicalgia                   ICD-10: M54.2

ICD-9: 723.1                            2018                

 

                          Other spondylosis with radiculopathy, cervical region 

                  ICD-10: 

M47.22

ICD-9: 721.0                            2018                

 

                          Cervicocranial syndrome                   ICD-10: M53.

0

ICD-9: 723.2                            2018                

 

                          Vertigo of central origin, bilateral                  

 ICD-10: H81.43

ICD-9: 386.2                            2018                

 

                          Otalgia, bilateral                   ICD-10: H92.03

ICD-9: 388.70                           2018                

 

                          Benign paroxysmal vertigo, bilateral                  

 ICD-10: H81.13

ICD-9: 386.11                           2018                

 

                          Radiculopathy, lumbosacral region                   IC

D-10: M54.17

ICD-9: 724.4                            2018                

 

                          Low back pain                   ICD-10: M54.5

ICD-9: 724.2                            2018                

 

                          Left lower quadrant pain                   ICD-10: R10

.32

ICD-9: 789.04                           2018                

 

                          Impaired fasting glucose                   ICD-10: R73

.01

ICD-9: 790.29                           2017                

 

                          Mixed hyperlipidemia                   ICD-10: E78.2

ICD-9: 272.4                            2017                

 

                          Other intervertebral disc degeneration, lumbar region 

                  ICD-10: 

M51.36

ICD-9: 722.52                           2017                

 

                          Pain in thoracic spine                   ICD-10: M54.6

ICD-9: 724.1                            2017                

 

                          Mastodynia                   ICD-10: N64.4

ICD-9: 611.71                           2017                

 

                          Acute upper respiratory infection, unspecified        

           ICD-10: J06.9

ICD-9: 465.9                            2017                

 

                          Acute mastoiditis without complications, right ear    

               ICD-10: 

H70.001

ICD-9: 383.00                           2016                

 

                          Constipation, unspecified                   ICD-10: K5

9.00

ICD-9: 564.00                           2016                

 

                          Chronic kidney disease, stage 1                   ICD-

10: N18.1

ICD-9: 585.1                            2016                

 

                          Muscle weakness (generalized)                   ICD-10

: M62.81

ICD-9: 780.79                           2015                

 

                          History of falling                   ICD-10: Z91.81

ICD-9: V15.88                           2015                

 

                    POLYURIA                   ICD-9: 788.42                   0

9/15/2015           

    

 

                    Acute renal failure                   ICD-9: 584.9          

         09/15/2015 

              

 

                    HYPERTENSION                   ICD-9: 401.9                 

  09/10/2015        

       

 

                    Hyperglycemia                   ICD-9: 790.29               

    09/10/2015      

         

 

                    CAD                   ICD-9: 414.00                   09/10/

2015                

 

                    HYPERLIPIDEMIA NEC/NOS                   ICD-9: 272.4       

            

09/10/2015                

 

                    MALAISE AND FATIGUE                   ICD-9: 780.79         

          09/10/2015

               

 

                    HYPOGLYCEMIA                   ICD-9: 251.2                 

  2015        

       

 

                    Grieving                   ICD-9: 309.0                               

   

 

                    ABDOMINAL PAIN                   ICD-9: 789.00              

     2015     

          

 

                    CERUMEN IMPACTION                   ICD-9: 380.4            

       2015   

            

 

                    EUSTACHIAN TUBE DYSFUNCTION                   ICD-9: 381.81 

                  

2015                

 

                    SUPERFIC PHLEBITIS-LEG                   ICD-9: 451.0       

            

2015                

 

                    Leg pain                   ICD-9: 729.5                               

   

 

                    Thoracic back pain                   ICD-9: 724.1           

        2015  

             

 

                    SPASM OF MUSCLE                   ICD-9: 728.85             

      2015    

           

 

                    DIZZINESS/VERTIGO                   ICD-9: 780.4            

       2015   

            

 

                    Suspicious nevus                   ICD-9: 238.2             

      2015    

           

 

                    Benign positional vertigo                   ICD-9: 386.11   

                

2015                

 

                    SINUSITIS, ACUTE                   ICD-9: 461.9             

      2014    

           

 

                    B12 DEFIC ANEMIA NEC                   ICD-9: 281.1         

          2014

               

 

                    COUGH                   ICD-9: 786.2                                  



 

                    URI, ACUTE                   ICD-9: 465.9                   

10/15/2013          

     

 

                    ANXIETY STATE NOS                   ICD-9: 300.00           

        2013  

             

 

                    FLU VACCINE                   ICD-9: V04.81                 

  2013        

       

 

                    CEPHALGIA                   ICD-9: 784.0                   0

2013           

    

 

                    OTITIS MEDIA NOS                   ICD-9: 382.9             

      2013    

           

 

                    Perforation of ear drum                   ICD-9: 384.20     

              

05/10/2013                

 

                    Mastalgia                   ICD-9: 611.71                   

2013          

     

 

                    GERD                   ICD-9: 530.81                                  



 

                    DYSPEPSIA                   ICD-9: 536.8                   1

           

    

 

                    IBS                   ICD-9: 564.1                   10/10/2

012                

 

                    URINARY TRACT INFECTION                   ICD-9: 599.0      

             

2012                

 

                    SPINAL ENTHESOPATHY                   ICD-9: 720.1          

         2012 

              

 

                    SACROILIITIS NEC                   ICD-9: 720.2             

      2012    

           

 

                    Radiculopathy of leg                   ICD-9: 724.4         

          2012

               

 

                    LUMB/LUMBOSAC DISC DEGEN                   ICD-9: 722.52    

               

2012                

 

                    SCIATICA                   ICD-9: 724.3                               

   

 

                    PAIN, LOWER BACK                   ICD-9: 724.2             

      2012    

           

 

                    Sacroiliac dysfunction                   ICD-9: 739.4       

            

2012                

 

                    HYPOTENSION                   ICD-9: 458.9                  

 2012         

      

 

                    PHARYNGITIS, ACUTE                   ICD-9: 462             

      2011    

           

 

                    DEPRESSIVE DISORDER NEC                   ICD-9: 311        

           

2011                

 

                    Heat exhaustion                   ICD-9: 992.5              

     2010     

          

 

                    DIAPHRAGMATIC HERNIA                   ICD-9: 553.3         

          2010

               

 

                    Gastritis                   ICD-9: 535.50                   

2010          

     

 

                    Esophagitis                   ICD-9: 530.10                 

  2010        

       



                                                                    



Reason For Visit

                      



                    Reason For Visit                   Effective Dates          

         Notes      

         

 

                    hearing loss                   2019                   

left ear            

   

 

                    follow up                   06/10/2019                      

             

 

                    follow up                   2019                   Jelena inman has upcoming 

appointment with Dr Claire and carotid dopplers tomorrow                

 

                    follow up                   2018                   Jelena inman had heart cath 

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                

 

                    follow up                   2018                   [address]

[place]                

 

                    follow up                   10/02/2018                      

             

 

                    follow up                   2018                      

             

 

                    high blood pressure                   2018            

       Dr Claire has 

ordered holter monitor and hydralazine 50mg PRN                

 

                    dizziness                   2018                   On 

[place] morning 

patient woke up and went to the bathroom and after lying down became very dizzy.
Patient has hx of vertigo so didn't think anything of it. She usually just has 
them intermittently, but had more that day. While at [employer] began to feel very 
ill and became very shaky. She got home and sat in the recliner and had the 
jittery/nervous feeling. Later that night it finally resolved. Patient feels 
like she had a spell like this around the  and thought it was due to 
extreme heat exhaustion.                

 

                    follow up                   2018                      

             

 

                    back pain                   2018                      

             

 

                    otalgia                   2018                        

           

 

                    back pain                   2018                      

             

 

                    injection(s)                   10/06/2017                   

flu shot            

   

 

                    lab draw                   2017                       

            

 

                    follow up                   2017                      

             

 

                    pelvic pain                   2017                   P

atient states pain 

started in May with a "Constant Ache" to left inguinal area. Patient states at 
that time pain was intermittent. Then, approximately 2nd week  of  pain 
worsened and radiates to left low back area.                

 

                    lab draw                   2017                       

            

 

                    hyperglycemia                   2017                  

                 

 

                    cough                   2017                          

         

 

                    otalgia                   2016                        

           

 

                    injection(s)                   10/07/2016                   

Influenza           

    

 

                    lab draw                   2016                       

            

 

                    constipation                   2016                   

                

 

                    flank pain                   2016                     

              

 

                    follow up                   2015                   3mo

 fwup               



 

                    injection(s)                   10/16/2015                   

Influenza           

    

 

                    acute renal failure                   09/15/2015            

                    

  

 

                    lab draw                   09/10/2015                       

            

 

                    fatigue                   2015                        

           

 

                    otalgia                   2015                        

           

 

                    pain, limb                   2015                     

              

 

                    follow up                   2015                   Hos

pital fwup          

     

 

                    high blood pressure                   2015            

                    

  

 

                    injection(s)                   10/17/2014                   

flu shot            

   

 

                    sinusitis                   2014                      

             

 

                    high blood pressure                   2014            

                    

  

 

                    cough                   2014                   Was hig

ht at Dr Claire's 

office so he started he on amlodipine 10mg but seems to be dragging her down. 
Patient decreased to 1/2 tablet this last week                

 

                    lab draw                   2014                       

            

 

                    cough                   2014                          

         

 

                    cough                   10/15/2013                          

         

 

                    high blood pressure                   2013            

                    

  

 

                    follow up                   2013                   ER 

               

 

                    dizziness                   2013                      

             

 

                    follow up                   2013                      

             

 

                    otalgia                   05/10/2013                        

           

 

                    breast complaint                   2013               

                    

 

                    lab draw                   2012                       

            

 

                    follow up                   2012                   2mo

 fwup               



 

                    follow up                   10/23/2012                   2wk

 fwup               



 

                    gas and bloating                   10/10/2012               

    Dr Claire has her

monitoring because was high in his office at last visit                

 

                    injection(s)                   2012                   

                

 

                    dizziness                   2012                      

             

 

                    dizziness                   2012                      

             

 

                    lab draw                   2012                       

            

 

                    muscle weakness                   2012                

   concerns of 

simvastatin with fatigue and muscle weakness                

 

                    leg pain/sciatica                   2012              

                    



 

                    hip pain                   2012                       

            

 

                    back pain                   2012                   has

 tried ice pack, 

muscle relaxers, and moist heat                

 

                    back pain                   2012                      

             

 

                    fatigue                   2012                   in ha

nds/feet            

   

 

                    otalgia                   2011                        

           

 

                    follow up                   2011                   2wk

 fwup               



 

                    back pain                   2011                   thi

nks ulcer may have 

returned                

 

                    lab draw                   2011                       

            

 

                    dizziness                   2011                   Lef

t ear and facial 

pain, right ear pain                 

 

                    follow up                   2011                   1wk

 fwup               



 

                    hip pain                   2011                   feel

s very draggy, 

fatigue, BP 80/63, normally running 100's/60's                

 

                    chills                   2010                         

          

 

                    injection(s)                   10/06/2010                   

flu shot            

   

 

                    follow up                   2010                   6wk

 fwup, had HH 

repaired and is starting to feel better, started on reglan 10mg tid             
  

 

                    follow up                   2010                   hos

p fwup              

 

 

                    follow up                   2010                   1mo

 fwup, saw Dr Claire 

and hasn't gave cardiac clearance yet for HH repair, did have chemical stress 
test yesterday and waiting on results                

 

                    follow up                   2010                   fro

m 

EGD/colonoscopy--has Hiatal Hernia and wants to do repair                

 

                    follow up                   2010                      

             



                                                                              



Results

                      



                    Observation                   Observation Code              

     Item           

                    Item Code                   Result                   Date   

             

 

                    GFR CALC                   6280690                   GFR Non

 Afr Amr            

                                        48 mL/min                   06/10/2019  

              

 

                    GFR CALC                   [id number]                   GFR Afr

 Amr                

                                        59 mL/min                   06/10/2019  

              

 

                    THYROID STIMULATING HORMONE                   41049         

          TSH       

                                        1.936 uIU/mL                   06/10/201

9         

      

 

                    COMPREHENSIVE METABOLIC                   77800             

      AST           

                                        18 U/L                   06/10/2019     

           

 

                    COMPREHENSIVE METABOLIC                   44298             

      ALT           

                                        11 U/L                   06/10/2019     

           

 

                    COMPREHENSIVE METABOLIC                   59074             

      BUN           

                                        18 mg/dL                   06/10/2019   

             

 

                    COMPREHENSIVE METABOLIC                   25860             

      ALBUMIN       

                                        4.2 g/dL                   06/10/2019   

          

  

 

                    COMPREHENSIVE METABOLIC                   99635             

      CHLORIDE      

                                        109 mmol/L                   06/10/2019 

         

     

 

                    COMPREHENSIVE METABOLIC                   56830             

      Bili Total    

                                        0.4 mg/dL                   06/10/2019  

       

      

 

                    COMPREHENSIVE METABOLIC                   91235             

      ALK PHOS      

                                        64 U/L                   06/10/2019     

         

 

 

                    COMPREHENSIVE METABOLIC                   14552             

      SODIUM        

                                        142 mmol/L                   06/10/2019 

           

   

 

                    COMPREHENSIVE METABOLIC                   17254             

      CREATININE    

                                        1.09 mg/dL                   06/10/2019 

       

       

 

                    COMPREHENSIVE METABOLIC                   02232             

      CALCIUM       

                                        9.3 mg/dL                   06/10/2019  

          

   

 

                    COMPREHENSIVE METABOLIC                   94761             

      POTASSIUM     

                                        4.7 mmol/L                   06/10/2019 

        

      

 

                    COMPREHENSIVE METABOLIC                   15921             

      Total Protein 

                                        6.3 g/dL                   06/10/2019   

    

        

 

                    COMPREHENSIVE METABOLIC                   06104             

      Glucose       

                                        84 mg/dL                   06/10/2019   

          

  

 

                    COMPREHENSIVE METABOLIC                   55568             

      Bicarbonate   

                                        25 mmol/L                   06/10/2019  

      

       

 

                    COMPREHENSIVE METABOLIC                   69260             

      AGAP          

                                        8 mmol/L                   06/10/2019   

             

 

                    COMPLETE BLOOD COUNT                   [id number]              

     WBC            

                                        7.8 10e9/L                   06/10/2019 

               

 

                    COMPLETE BLOOD COUNT                   [id number]              

     RBC            

                                        4.04 10e12/L                   06/10/201

9              

 

 

                    COMPLETE BLOOD COUNT                   [id number]              

     HEMOGLOBIN     

                                        12.2 g/dL                   06/10/2019  

        

     

 

                    COMPLETE BLOOD COUNT                   [id number]              

     HEMATOCRIT     

                                        38.6 %                   06/10/2019     

        

  

 

                    COMPLETE BLOOD COUNT                   [id number]              

     MCV            

                                        95.5 fL                   06/10/2019    

            

 

                    COMPLETE BLOOD COUNT                   [id number]              

     MCH            

                                        30.2 pg                   06/10/2019    

            

 

                    COMPLETE BLOOD COUNT                   [id number]              

     MCHC           

                                        31.6 g/dL                   06/10/2019  

              

 

                    COMPLETE BLOOD COUNT                   [id number]              

     PLATELET COUNT 

                                        215 10e9/L                   06/10/2019 

    

          

 

                    COMPLETE BLOOD COUNT                   [id number]              

     Mean Plt Volume

                                        11.2 fL                   06/10/2019    

   

        

 

                    COMPLETE BLOOD COUNT                   [id number]              

     Neut Auto      

                                        45.7 %                   06/10/2019     

         

 

 

                    COMPLETE BLOOD COUNT                   [id number]              

     Lymph Auto     

                                        42.1 %                   06/10/2019     

        

  

 

                    COMPLETE BLOOD COUNT                   [id number]              

     Mono Auto      

                                        9.2 %                   06/10/2019      

         



 

                    COMPLETE BLOOD COUNT                   [id number]              

     RDW            

                                        13.4 %                   06/10/2019     

           

 

                    COMPLETE BLOOD COUNT                   [id number]              

     Eos Auto       

                                        2.7 %                   06/10/2019      

          

 

                    COMPLETE BLOOD COUNT                   [id number]              

     Baso Auto      

                                        0.3 %                   06/10/2019      

         



 

                    COMPLETE BLOOD COUNT                   [id number]              

     Neutrophil Abs 

                                        3.56 10e9/L                   06/10/2019

    

           

 

                    COMPLETE BLOOD COUNT                   [id number]              

     Lymphocyte Abs 

                                        3.28 10e9/L                   06/10/2019

    

           

 

                    COMPLETE BLOOD COUNT                   [id number]              

     Monocyte Abs   

                                        0.72 10e9/L                   06/10/2019

      

         

 

                    COMPLETE BLOOD COUNT                   [id number]              

     Eosinophil Abs 

                                        0.21 10e9/L                   06/10/2019

    

           

 

                    COMPLETE BLOOD COUNT                   [id number]              

     RDW-SD         

                                        44.9 fL                   06/10/2019    

            

 

                    COMPLETE BLOOD COUNT                   [id number]              

     Basophil Abs   

                                        0.02 10e9/L                   06/10/2019

      

         

 

                    COMPLETE BLOOD COUNT                   [id number]              

     WBC            

                                        5.2 10e9/L                   2018 

               

 

                    COMPLETE BLOOD COUNT                   [id number]              

     RBC            

                                        4.21 10e12/L                   

8              

 

 

                    COMPLETE BLOOD COUNT                   [id number]              

     HEMOGLOBIN     

                                        12.8 g/dL                   2018  

        

     

 

                    COMPLETE BLOOD COUNT                   [id number]              

     HEMATOCRIT     

                                        39.0 %                   2018     

        

  

 

                    COMPLETE BLOOD COUNT                   [id number]              

     MCV            

                                        92.6 fL                   2018    

            

 

                    COMPLETE BLOOD COUNT                   [id number]              

     MCH            

                                        30.4 pg                   2018    

            

 

                    COMPLETE BLOOD COUNT                   [id number]              

     MCHC           

                                        32.8 g/dL                   2018  

              

 

                    COMPLETE BLOOD COUNT                   [id number]              

     PLATELET COUNT 

                                        202 10e9/L                   2018 

    

          

 

                    COMPLETE BLOOD COUNT                   [id number]              

     Mean Plt Volume

                                        10.7 fL                   2018    

   

        

 

                    COMPLETE BLOOD COUNT                   [id number]              

     Neut Auto      

                                        40.9 %                   2018     

         

 

 

                    COMPLETE BLOOD COUNT                   [id number]              

     Lymph Auto     

                                        46.3 %                   2018     

        

  

 

                    COMPLETE BLOOD COUNT                   [id number]              

     Mono Auto      

                                        9.3 %                   2018      

         



 

                    COMPLETE BLOOD COUNT                   [id number]              

     RDW            

                                        13.7 %                   2018     

           

 

                    COMPLETE BLOOD COUNT                   [id number]              

     Eos Auto       

                                        2.9 %                   2018      

          

 

                    COMPLETE BLOOD COUNT                   [id number]              

     Baso Auto      

                                        0.6 %                   2018      

         



 

                    COMPLETE BLOOD COUNT                   [id number]              

     Neutrophil Abs 

                                        2.13 10e9/L                   2018

    

           

 

                    COMPLETE BLOOD COUNT                   [id number]              

     Lymphocyte Abs 

                                        2.41 10e9/L                   2018

    

           

 

                    COMPLETE BLOOD COUNT                   [id number]              

     Monocyte Abs   

                                        0.48 10e9/L                   2018

      

         

 

                    COMPLETE BLOOD COUNT                   [id number]              

     Eosinophil Abs 

                                        0.15 10e9/L                   2018

    

           

 

                    COMPLETE BLOOD COUNT                   [id number]              

     RDW-SD         

                                        45.2 fL                   2018    

            

 

                    COMPLETE BLOOD COUNT                   [id number]              

     Basophil Abs   

                                        0.03 10e9/L                   2018

      

         

 

                    METABOLIC PANEL TOTAL CA                   90432            

       Glucose      

                                        118 mg/dL                   2018  

         

    

 

                    METABOLIC PANEL TOTAL CA                   90956            

       CREATININE   

                                        1.01 mg/dL                   2018 

      

        

 

                    METABOLIC PANEL TOTAL CA                   09580            

       BUN          

                                        14 mg/dL                   2018   

             

 

                    METABOLIC PANEL TOTAL CA                   78944            

       SODIUM       

                                        141 mmol/L                   2018 

          

    

 

                    METABOLIC PANEL TOTAL CA                   37930            

       POTASSIUM    

                                        4.0 mmol/L                   2018 

       

       

 

                    METABOLIC PANEL TOTAL CA                   09178            

       CHLORIDE     

                                        108 mmol/L                   2018 

        

      

 

                    METABOLIC PANEL TOTAL CA                   92938            

       Bicarbonate  

                                        25 mmol/L                   2018  

     

        

 

                    METABOLIC PANEL TOTAL CA                   33519            

       AGAP         

                                        8 mmol/L                   2018   

            



 

                    METABOLIC PANEL TOTAL CA                   99102            

       CALCIUM      

                                        9.6 mg/dL                   2018  

         

    

 

                FREE T4                   91583                   T4 Free       

                

                          1.23 ng/dL                   2018               

 

 

                    GFR CALC                   [id number]                   GFR Non

 Afr Amr            

                                        53 mL/min                   2018  

              

 

                    GFR CALC                   [id number]                   GFR Afr

 Amr                

                                        >60 mL/min                   2018 

               

 

                    THYROID STIMULATING HORMONE                   68538         

          TSH       

                                        2.124 uIU/mL                   

8         

      

 

                    LIPID GROUP                   [id number]                   Choles

terol               

                                        152 mg/dL                   2018  

              

 

                    LIP[employer]                   [id number]                   Trigly

ceride              

                                        151 mg/dL                   2018  

              

 

                    LIP[employer]                   [id number]                   HDL CH

OLESTEROL           

                                        47 mg/dL                   2018   

             

 

                    [employer]                   [id number]                   Chol/H

DL Ratio            

                                        3.23 ratio                   2018 

               

 

                    [employer]                   [id number]                   NON-HD

L Chol              

                                        105 mg/dL                   2018  

              

 

                    LIP[employer]                   [id number]                   LDL Ch

olesterol           

                                        75 mg/dL                   2018   

             

 

                    ASSAY OF TROPONIN QUANT                   73583             

      Troponin-I    

                                        <0.30 ng/mL                   2018

       

        

 

                    COMPREHENSIVE METABOLIC                   42953             

      AST           

                                        20 U/L                   2018     

           

 

                    COMPREHENSIVE METABOLIC                   89628             

      ALT           

                                        14 U/L                   2018     

           

 

                    COMPREHENSIVE METABOLIC                   39227             

      BUN           

                                        19 mg/dL                   2018   

             

 

                    COMPREHENSIVE METABOLIC                   37640             

      ALBUMIN       

                                        4.2 g/dL                   2018   

          

  

 

                    COMPREHENSIVE METABOLIC                   26443             

      CHLORIDE      

                                        102 mmol/L                   2018 

         

     

 

                    COMPREHENSIVE METABOLIC                   02097             

      Bili Total    

                                        0.4 mg/dL                   2018  

       

      

 

                    COMPREHENSIVE METABOLIC                   85699             

      ALK PHOS      

                                        66 U/L                   2018     

         

 

 

                    COMPREHENSIVE METABOLIC                   26129             

      SODIUM        

                                        135 mmol/L                   2018 

           

   

 

                    COMPREHENSIVE METABOLIC                   37771             

      CREATININE    

                                        1.01 mg/dL                   2018 

       

       

 

                    COMPREHENSIVE METABOLIC                   55884             

      CALCIUM       

                                        9.3 mg/dL                   2018  

          

   

 

                    COMPREHENSIVE METABOLIC                   99538             

      POTASSIUM     

                                        4.8 mmol/L                   2018 

        

      

 

                    COMPREHENSIVE METABOLIC                   78890             

      Total Protein 

                                        7.0 g/dL                   2018   

    

        

 

                    COMPREHENSIVE METABOLIC                   17733             

      Glucose       

                                        91 mg/dL                   2018   

          

  

 

                    COMPREHENSIVE METABOLIC                   03280             

      Bicarbonate   

                                        23 mmol/L                   2018  

      

       

 

                    COMPREHENSIVE METABOLIC                   44087             

      AGAP          

                                        10 mmol/L                   2018  

             



 

                    COMPLETE BLOOD COUNT                   [id number]              

     WBC            

                                        7.5 10e9/L                   2018 

               

 

                    COMPLETE BLOOD COUNT                   [id number]              

     RBC            

                                        4.13 10e12/L                   

8              

 

 

                    COMPLETE BLOOD COUNT                   [id number]              

     HEMOGLOBIN     

                                        12.6 g/dL                   2018  

        

     

 

                    COMPLETE BLOOD COUNT                   [id number]              

     HEMATOCRIT     

                                        38.4 %                   2018     

        

  

 

                    COMPLETE BLOOD COUNT                   [id number]              

     MCV            

                                        93.0 fL                   2018    

            

 

                    COMPLETE BLOOD COUNT                   [id number]              

     MCH            

                                        30.5 pg                   2018    

            

 

                    COMPLETE BLOOD COUNT                   1104284              

     MCHC           

                                        32.8 g/dL                   2018  

              

 

                    COMPLETE BLOOD COUNT                   [id number]              

     PLATELET COUNT 

                                        204 10e9/L                   2018 

    

          

 

                    COMPLETE BLOOD COUNT                   [id number]              

     Mean Plt Volume

                                        10.9 fL                   2018    

   

        

 

                    COMPLETE BLOOD COUNT                   5473504              

     Neut Auto      

                                        43.1 %                   2018     

         

 

 

                    COMPLETE BLOOD COUNT                   [id number]              

     Lymph Auto     

                                        45.0 %                   2018     

        

  

 

                    COMPLETE BLOOD COUNT                   [id number]              

     Mono Auto      

                                        9.2 %                   2018      

         



 

                    COMPLETE BLOOD COUNT                   [id number]              

     RDW            

                                        13.4 %                   2018     

           

 

                    COMPLETE BLOOD COUNT                   [id number]              

     Eos Auto       

                                        2.3 %                   2018      

          

 

                    COMPLETE BLOOD COUNT                   [id number]              

     Baso Auto      

                                        0.4 %                   2018      

         



 

                    COMPLETE BLOOD COUNT                   [id number]              

     Neutrophil Abs 

                                        3.23 10e9/L                   2018

    

           

 

                    COMPLETE BLOOD COUNT                   [id number]              

     Lymphocyte Abs 

                                        3.38 10e9/L                   2018

    

           

 

                    COMPLETE BLOOD COUNT                   [id number]              

     Monocyte Abs   

                                        0.69 10e9/L                   2018

      

         

 

                    COMPLETE BLOOD COUNT                   [id number]              

     Eosinophil Abs 

                                        0.17 10e9/L                   2018

    

           

 

                    COMPLETE BLOOD COUNT                   [id number]              

     RDW-SD         

                                        44.4 fL                   2018    

            

 

                    COMPLETE BLOOD COUNT                   [id number]              

     Basophil Abs   

                                        0.03 10e9/L                   2018

      

         

 

                    GFR CALC                   6857676                   GFR Non

 Afr Amr            

                                        53 mL/min                   2018  

              

 

                    GFR CALC                   9813317                   GFR Afr

 Amr                

                                        >60 mL/min                   2018 

               

 

                    GLYCOSYLATED HEMOGLOBIN TEST                   24388        

           Hgb A1c  

                    95423-5                   5.4 %                   2018

    

           

 

                    MEAN GLUC                                      Calc M

elisha Gluc            

                                        108 mg/dL                   2018  

              

 

                    MEAN GLUC                   [id number]                   Calc M

elisha Gluc            

                                        114 mg/dL                   2017  

              

 

                    LIPID GROUP                   [id number]                   Choles

terol               

                                        146 mg/dL                   2017  

              

 

                    LIP[employer] GROUP                   [id number]                   Trigly

ceride              

                                        119 mg/dL                   2017  

              

 

                    LIP[employer] GROUP                   [id number]                   HDL CH

OLESTEROL           

                                        47 mg/dL                   2017   

             

 

                    LIP[employer] GROUP                   [id number]                   Chol/H

DL Ratio            

                                        3.11 ratio                   2017 

               

 

                    LIP[employer] GROUP                   [id number]                   NON-HD

L Chol              

                                        99 mg/dL                   2017   

             

 

                    LIPID GROUP                   [id number]                   LDL Ch

olesterol           

                                        75 mg/dL                   2017   

             

 

                    GLYCOSYLATED HEMOGLOBIN TEST                   33304        

           Hgb A1c  

                    38761-7                   5.6 %                   2017

    

           

 

                    COMPREHENSIVE METABOLIC                   08311             

      AST           

                                        22 U/L                   2017     

           

 

                    COMPREHENSIVE METABOLIC                   89068             

      ALT           

                                        12 U/L                   2017     

           

 

                    COMPREHENSIVE METABOLIC                   15807             

      BUN           

                                        17 mg/dL                   2017   

             

 

                    COMPREHENSIVE METABOLIC                   60635             

      ALBUMIN       

                                        4.0 g/dL                   2017   

          

  

 

                    COMPREHENSIVE METABOLIC                   09560             

      CHLORIDE      

                                        110 mmol/L                   2017 

         

     

 

                    COMPREHENSIVE METABOLIC                   99341             

      Bili Total    

                                        0.4 mg/dL                   2017  

       

      

 

                    COMPREHENSIVE METABOLIC                   30755             

      ALK PHOS      

                                        63 U/L                   2017     

         

 

 

                    COMPREHENSIVE METABOLIC                   76427             

      SODIUM        

                                        140 mmol/L                   2017 

           

   

 

                    COMPREHENSIVE METABOLIC                   91815             

      CREATININE    

                                        1.05 mg/dL                   2017 

       

       

 

                    COMPREHENSIVE METABOLIC                   10954             

      CALCIUM       

                                        9.2 mg/dL                   2017  

          

   

 

                    COMPREHENSIVE METABOLIC                   20803             

      POTASSIUM     

                                        4.2 mmol/L                   2017 

        

      

 

                    COMPREHENSIVE METABOLIC                   94945             

      Total Protein 

                                        6.2 g/dL                   2017   

    

        

 

                    COMPREHENSIVE METABOLIC                   56553             

      Glucose       

                                        87 mg/dL                   2017   

          

  

 

                    COMPREHENSIVE METABOLIC                   12888             

      Bicarbonate   

                                        24 mmol/L                   2017  

      

       

 

                    COMPREHENSIVE METABOLIC                   48625             

      AGAP          

                                        6 mmol/L                   2017   

             

 

                    GFR CALC                   5301788                   GFR Non

 Afr Amr            

                                        51 mL/min                   2017  

              

 

                    GFR CALC                   9088736                   GFR Afr

 Amr                

                                        >60 mL/min                   2017 

               

 

                    COMPLETE BLOOD COUNT                   0078633              

     WBC            

                                        6.7 10e9/L                   2017 

               

 

                    COMPLETE BLOOD COUNT                   [id number]              

     RBC            

                                        4.04 10e12/L                   

7              

 

 

                    COMPLETE BLOOD COUNT                   9408075              

     HEMOGLOBIN     

                                        12.1 g/dL                   2017  

        

     

 

                    COMPLETE BLOOD COUNT                   1093797              

     HEMATOCRIT     

                                        38.0 %                   2017     

        

  

 

                    COMPLETE BLOOD COUNT                   5123850              

     MCV            

                                        94.1 fL                   2017    

            

 

                    COMPLETE BLOOD COUNT                   9256492              

     MCH            

                                        30.0 pg                   2017    

            

 

                    COMPLETE BLOOD COUNT                   0962191              

     MCHC           

                                        31.8 g/dL                   2017  

              

 

                    COMPLETE BLOOD COUNT                   0128151              

     PLATELET COUNT 

                                        206 10e9/L                   2017 

    

          

 

                    COMPLETE BLOOD COUNT                   [id number]              

     Mean Plt Volume

                                        11.3 fL                   2017    

   

        

 

                    COMPLETE BLOOD COUNT                   3762155              

     Neut Auto      

                                        35.8 %                   2017     

         

 

 

                    COMPLETE BLOOD COUNT                   9265549              

     Lymph Auto     

                                        51.6 %                   2017     

        

  

 

                    COMPLETE BLOOD COUNT                   [id number]              

     Mono Auto      

                                        8.8 %                   2017      

         



 

                    COMPLETE BLOOD COUNT                   6008190              

     RDW            

                                        13.5 %                   2017     

           

 

                    COMPLETE BLOOD COUNT                   [id number]              

     Eos Auto       

                                        3.4 %                   2017      

          

 

                    COMPLETE BLOOD COUNT                   [id number]              

     Baso Auto      

                                        0.4 %                   2017      

         



 

                    COMPLETE BLOOD COUNT                   [id number]              

     Neutrophil Abs 

                                        2.40 10e9/L                   2017

    

           

 

                    COMPLETE BLOOD COUNT                   [id number]              

     Lymphocyte Abs 

                                        3.46 10e9/L                   2017

    

           

 

                    COMPLETE BLOOD COUNT                   [id number]              

     Monocyte Abs   

                                        0.59 10e9/L                   2017

      

         

 

                    COMPLETE BLOOD COUNT                   [id number]              

     Eosinophil Abs 

                                        0.23 10e9/L                   2017

    

           

 

                    COMPLETE BLOOD COUNT                   [id number]              

     RDW-SD         

                                        45.3 fL                   2017    

            

 

                    COMPLETE BLOOD COUNT                   [id number]              

     Basophil Abs   

                                        0.03 10e9/L                   2017

      

         

 

                    THYROID STIMULATING HORMONE                   97206         

          TSH       

                                        1.981 uIU/mL                   

7         

      

 

                    COMPLETE BLOOD COUNT                   [id number]              

     WBC            

                                        6.0 10e9/L                   2017 

               

 

                    COMPLETE BLOOD COUNT                   [id number]              

     RBC            

                                        4.29 10e12/L                   

7              

 

 

                    COMPLETE BLOOD COUNT                   [id number]              

     HEMOGLOBIN     

                                        12.9 g/dL                   2017  

        

     

 

                    COMPLETE BLOOD COUNT                   [id number]              

     HEMATOCRIT     

                                        38.4 %                   2017     

        

  

 

                    COMPLETE BLOOD COUNT                   [id number]              

     MCV            

                                        89.5 fL                   2017    

            

 

                    COMPLETE BLOOD COUNT                   [id number]              

     MCH            

                                        30.1 pg                   2017    

            

 

                    COMPLETE BLOOD COUNT                   [id number]              

     MCHC           

                                        33.6 g/dL                   2017  

              

 

                    COMPLETE BLOOD COUNT                   [id number]              

     PLATELET COUNT 

                                        181 10e9/L                   2017 

    

          

 

                    COMPLETE BLOOD COUNT                   [id number]              

     Mean Plt Volume

                                        11.7 fL                   2017    

   

        

 

                    COMPLETE BLOOD COUNT                   [id number]              

     Neut Auto      

                                        36.9 %                   2017     

         

 

 

                    COMPLETE BLOOD COUNT                   [id number]              

     Lymph Auto     

                                        50.4 %                   2017     

        

  

 

                    COMPLETE BLOOD COUNT                   [id number]              

     Mono Auto      

                                        9.0 %                   2017      

         



 

                    COMPLETE BLOOD COUNT                   [id number]              

     RDW            

                                        13.7 %                   2017     

           

 

                    COMPLETE BLOOD COUNT                   [id number]              

     Eos Auto       

                                        3.4 %                   2017      

          

 

                    COMPLETE BLOOD COUNT                   [id number]              

     Baso Auto      

                                        0.3 %                   2017      

         



 

                    COMPLETE BLOOD COUNT                   [id number]              

     Neutrophil Abs 

                                        2.21 10e9/L                   2017

    

           

 

                    COMPLETE BLOOD COUNT                   [id number]              

     Lymphocyte Abs 

                                        3.02 10e9/L                   2017

    

           

 

                    COMPLETE BLOOD COUNT                   [id number]              

     Monocyte Abs   

                                        0.54 10e9/L                   2017

      

         

 

                    COMPLETE BLOOD COUNT                   [id number]              

     Eosinophil Abs 

                                        0.20 10e9/L                   2017

    

           

 

                    COMPLETE BLOOD COUNT                   [id number]              

     RDW-SD         

                                        44.0 fL                   2017    

            

 

                    COMPLETE BLOOD COUNT                   [id number]              

     Basophil Abs   

                                        0.02 10e9/L                   2017

      

         

 

                    GLYCOSYLATED HEMOGLOBIN TEST                   53684        

           Hgb A1c  

                    30154-0                   5.4 %                   2017

    

           

 

                    THYROID STIMULATING HORMONE                   88788         

          TSH       

                                        2.200 uIU/mL                   

7         

      

 

                    GFR CALC                   [id number]                   GFR Non

 Afr Amr            

                                        50 mL/min                   2017  

              

 

                    GFR CALC                   8411248                   GFR Afr

 Amr                

                                        >60 mL/min                   2017 

               

 

                    MEAN GLUC                   [id number]                   Calc M

elisha Gluc            

                                        108 mg/dL                   2017  

              

 

                    COMPREHENSIVE METABOLIC                   99083             

      AST           

                                        18 U/L                   2017     

           

 

                    COMPREHENSIVE METABOLIC                   36842             

      ALT           

                                        10 U/L                   2017     

           

 

                    COMPREHENSIVE METABOLIC                   62663             

      BUN           

                                        20 mg/dL                   2017   

             

 

                    COMPREHENSIVE METABOLIC                   27494             

      ALBUMIN       

                                        4.1 g/dL                   2017   

          

  

 

                    COMPREHENSIVE METABOLIC                   87026             

      CHLORIDE      

                                        109 mmol/L                   2017 

         

     

 

                    COMPREHENSIVE METABOLIC                   73235             

      Bili Total    

                                        0.6 mg/dL                   2017  

       

      

 

                    COMPREHENSIVE METABOLIC                   39179             

      ALK PHOS      

                                        64 U/L                   2017     

         

 

 

                    COMPREHENSIVE METABOLIC                   43076             

      SODIUM        

                                        141 mmol/L                   2017 

           

   

 

                    COMPREHENSIVE METABOLIC                   22478             

      CREATININE    

                                        1.06 mg/dL                   2017 

       

       

 

                    COMPREHENSIVE METABOLIC                   24903             

      CALCIUM       

                                        9.9 mg/dL                   2017  

          

   

 

                    COMPREHENSIVE METABOLIC                   38214             

      POTASSIUM     

                                        4.2 mmol/L                   2017 

        

      

 

                    COMPREHENSIVE METABOLIC                   59583             

      Total Protein 

                                        6.3 g/dL                   2017   

    

        

 

                    COMPREHENSIVE METABOLIC                   34412             

      Glucose       

                                        99 mg/dL                   2017   

          

  

 

                    COMPREHENSIVE METABOLIC                   69427             

      Bicarbonate   

                                        21 mmol/L                   2017  

      

       

 

                    COMPREHENSIVE METABOLIC                   88198             

      AGAP          

                                        11 mmol/L                   2017  

             



 

                    LIPID GROUP                   [id number]                   Choles

terol               

                                        169 mg/dL                   2016  

              

 

                    LIPID GROUP                   [id number]                   Trigly

ceride              

                                        165 mg/dL                   2016  

              

 

                    LIPID GROUP                   [id number]                   HDL CH

OLESTEROL           

                                        43 mg/dL                   2016   

             

 

                    LIPID GROUP                   [id number]                   Chol/H

DL Ratio            

                                        3.93 ratio                   2016 

               

 

                    LIPID GROUP                   [id number]                   NON-HD

L Chol              

                                        126 mg/dL                   2016  

              

 

                    LIPID GROUP                   [id number]                   LDL Ch

olesterol           

                                        93 mg/dL                   2016   

             

 

                    COMPREHENSIVE METABOLIC                   61778             

      AST           

                                        18 U/L                   2016     

           

 

                    COMPREHENSIVE METABOLIC                   66675             

      ALT           

                                        10 U/L                   2016     

           

 

                    COMPREHENSIVE METABOLIC                   21407             

      BUN           

                                        20 mg/dL                   2016   

             

 

                    COMPREHENSIVE METABOLIC                   21320             

      ALBUMIN       

                                        3.9 g/dL                   2016   

          

  

 

                    COMPREHENSIVE METABOLIC                   68209             

      CHLORIDE      

                                        110 mmol/L                   2016 

         

     

 

                    COMPREHENSIVE METABOLIC                   98020             

      Bili Total    

                                        0.5 mg/dL                   2016  

       

      

 

                    COMPREHENSIVE METABOLIC                   60695             

      ALK PHOS      

                                        72 U/L                   2016     

         

 

 

                    COMPREHENSIVE METABOLIC                   26452             

      SODIUM        

                                        141 mmol/L                   2016 

           

   

 

                    COMPREHENSIVE METABOLIC                   51198             

      CREATININE    

                                        1.12 mg/dL                   2016 

       

       

 

                    COMPREHENSIVE METABOLIC                   91731             

      CALCIUM       

                                        9.7 mg/dL                   2016  

          

   

 

                    COMPREHENSIVE METABOLIC                   42317             

      POTASSIUM     

                                        4.4 mmol/L                   2016 

        

      

 

                    COMPREHENSIVE METABOLIC                   84295             

      Total Protein 

                                        6.2 g/dL                   2016   

    

        

 

                    COMPREHENSIVE METABOLIC                   31958             

      Glucose       

                                        90 mg/dL                   2016   

          

  

 

                    COMPREHENSIVE METABOLIC                   30333             

      Bicarbonate   

                                        23 mmol/L                   2016  

      

       

 

                    COMPREHENSIVE METABOLIC                   74962             

      AGAP          

                                        8 mmol/L                   2016   

             

 

                    GFR CALC                   9121422                   GFR Non

 Afr Amr            

                                        47 mL/min                   2016  

              

 

                    GFR CALC                   1426196                   GFR Afr

 Amr                

                                        57 mL/min                   2016  

              

 

                    GLYCOSYLATED HEMOGLOBIN TEST                   57314        

           Hgb A1c  

                    23336-0                   5.5 %                   2016

    

           

 

                    THYROID STIMULATING HORMONE                   86038         

          TSH       

                                        2.537 uIU/mL                   

6         

      

 

                FREE T4                   15183                   T4 Free       

                

                          1.36 ng/dL                   2016               

 

 

                    COMPLETE BLOOD COUNT                   [id number]              

     WBC            

                                        6.8 10e9/L                   2016 

               

 

                    COMPLETE BLOOD COUNT                   [id number]              

     RBC            

                                        4.20 10e12/L                   

6              

 

 

                    COMPLETE BLOOD COUNT                   4371988              

     HEMOGLOBIN     

                                        12.5 g/dL                   2016  

        

     

 

                    COMPLETE BLOOD COUNT                   1340427              

     HEMATOCRIT     

                                        38.0 %                   2016     

        

  

 

                    COMPLETE BLOOD COUNT                   6445097              

     MCV            

                                        90.5 fL                   2016    

            

 

                    COMPLETE BLOOD COUNT                   0794029              

     MCH            

                                        29.8 pg                   2016    

            

 

                    COMPLETE BLOOD COUNT                   5510915              

     MCHC           

                                        32.9 g/dL                   2016  

              

 

                    COMPLETE BLOOD COUNT                   9222312              

     PLATELET COUNT 

                                        197 10e9/L                   2016 

    

          

 

                    COMPLETE BLOOD COUNT                   3063538              

     Mean Plt Volume

                                        11.7 fL                   2016    

   

        

 

                    COMPLETE BLOOD COUNT                   [id number]              

     Neut Auto      

                                        41.3 %                   2016     

         

 

 

                    COMPLETE BLOOD COUNT                   3392793              

     Lymph Auto     

                                        47.1 %                   2016     

        

  

 

                    COMPLETE BLOOD COUNT                   9819881              

     Mono Auto      

                                        7.8 %                   2016      

         



 

                    COMPLETE BLOOD COUNT                   0538414              

     RDW            

                                        13.8 %                   2016     

           

 

                    COMPLETE BLOOD COUNT                   5710575              

     Eos Auto       

                                        3.4 %                   2016      

          

 

                    COMPLETE BLOOD COUNT                   [id number]              

     Baso Auto      

                                        0.4 %                   2016      

         



 

                    COMPLETE BLOOD COUNT                   3076815              

     Neutrophil Abs 

                                        2.81 10e9/L                   2016

    

           

 

                    COMPLETE BLOOD COUNT                   5361060              

     Lymphocyte Abs 

                                        3.20 10e9/L                   2016

    

           

 

                    COMPLETE BLOOD COUNT                   [id number]              

     Monocyte Abs   

                                        0.53 10e9/L                   2016

      

         

 

                    COMPLETE BLOOD COUNT                   0659991              

     Eosinophil Abs 

                                        0.23 10e9/L                   2016

    

           

 

                    COMPLETE BLOOD COUNT                   [id number]              

     RDW-SD         

                                        44.4 fL                   2016    

            

 

                    COMPLETE BLOOD COUNT                   [id number]              

     Basophil Abs   

                                        0.03 10e9/L                   2016

      

         

 

                    MEAN GLUC                   2304320                   Calc M

elisha Gluc            

                                        111 mg/dL                   2016  

              

 

                    METABOLIC PANEL TOTAL CA                   70638            

       Glucose      

                                        89 MG/DL                   2015   

         

   

 

                    METABOLIC PANEL TOTAL CA                   31937            

       CREATININE   

                                        1.12 MG/DL                   2015 

      

        

 

                    METABOLIC PANEL TOTAL CA                   11778            

       BUN          

                                        20 MG/DL                   2015   

             

 

                    METABOLIC PANEL TOTAL CA                   86830            

       SODIUM       

                                        139 MMOL/L                   2015 

          

    

 

                    METABOLIC PANEL TOTAL CA                   83122            

       POTASSIUM    

                                        4.6 MMOL/L                   2015 

       

       

 

                    METABOLIC PANEL TOTAL CA                   81556            

       CHLORIDE     

                                        108 MMOL/L                   2015 

        

      

 

                    METABOLIC PANEL TOTAL CA                   24537            

       BICARB       

                                        26 MMOL/L                   2015  

          

   

 

                    METABOLIC PANEL TOTAL CA                   80548            

       ANION GAP    

                                        5 MEQ/L                   2015    

       

    

 

                    METABOLIC PANEL TOTAL CA                   10729            

       CALCIUM      

                                        10.0 MG/DL                   2015 

         

     

 

                GFR CALC                   7725628                   GFR AA     

                

                          57.0L ML/MIN                   2015             

   

 

                    GFR CALC                   7859677                   GFR NON

-AA                 

                                        47.0L ML/MIN                   

5                

 

                    THYROID STIMULATING HORMONE                   67473         

          TSH       

                                        2.378 uIU/ML                   09/10/201

5         

      

 

                    COMPLETE BLOOD COUNT                   [id number]              

     WBC            

                                        6.4 10e9/L                   09/10/2015 

               

 

                    COMPLETE BLOOD COUNT                   [id number]              

     RBC            

                                        3.99 10e12/L                   09/10/201

5              

 

 

                    COMPLETE BLOOD COUNT                   4092627              

     HGB            

                                        11.9 g/dL                   09/10/2015  

              

 

                    COMPLETE BLOOD COUNT                   7444806              

     HCT DET        

                                        36.9 %                   09/10/2015     

           

 

                    COMPLETE BLOOD COUNT                   2091931              

     MCV            

                                        92.5 fL                   09/10/2015    

            

 

                    COMPLETE BLOOD COUNT                   [id number]              

     MCH            

                                        29.8 pg                   09/10/2015    

            

 

                    COMPLETE BLOOD COUNT                   6519152              

     MCHC           

                                        32.2 g/dL                   09/10/2015  

              

 

                    COMPLETE BLOOD COUNT                   [id number]              

     PLT            

                                        172 10e9/L                   09/10/2015 

               

 

                    COMPLETE BLOOD COUNT                   5744572              

     MPV            

                                        11.7 fL                   09/10/2015    

            

 

                    COMPLETE BLOOD COUNT                   7761013              

     ARIK %          

                                        40.4 %                   09/10/2015     

           

 

                    COMPLETE BLOOD COUNT                   8509437              

     LY %           

                                        48.0 %                   09/10/2015     

           

 

                    COMPLETE BLOOD COUNT                   [id number]              

     MON %          

                                        8.3 %                   09/10/2015      

          

 

                    COMPLETE BLOOD COUNT                   5257094              

     EOS  %         

                                        2.8 %                   09/10/2015      

          

 

                    COMPLETE BLOOD COUNT                   [id number]              

     BASO %         

                                        0.5 %                   09/10/2015      

          

 

                    COMPLETE BLOOD COUNT                   [id number]              

     RDW            

                                        13.6 %                   09/10/2015     

           

 

                    COMPLETE BLOOD COUNT                   [id number]              

     ABS ARIK        

                                        2.59 10e9/L                   09/10/2015

           

    

 

                    COMPLETE BLOOD COUNT                   [id number]              

     ABS LYMPH      

                                        3.07 10e9/L                   09/10/2015

         

      

 

                    COMPLETE BLOOD COUNT                   [id number]              

     ABS MONO       

                                        0.53 10e9/L                   09/10/2015

          

     

 

                    COMPLETE BLOOD COUNT                   [id number]              

     ABS EOS        

                                        0.18 10e9/L                   09/10/2015

           

    

 

                    COMPLETE BLOOD COUNT                   [id number]              

     ABS BASO       

                                        0.03 10e9/L                   09/10/2015

          

     

 

                    COMPLETE BLOOD COUNT                   [id number]              

     RDW-SD         

                                        44.9 fL                   09/10/2015    

            

 

                    LIPID GROUP                   [id number]                   HDL TE

ST                  

                                        42 MG/DL                   09/10/2015   

             

 

                LIPID GROUP                   [id number]                   TRIG      

                

                          177 MG/DL                   09/10/2015                

 

                    LIPID GROUP                   [id number]                   TEST L

DL                  

                                        72 MG/DL                   09/10/2015   

             

 

                LIPID GROUP                   [id number]                   CHOL      

                

                          149 MG/DL                   09/10/2015                

 

                    LIPID GROUP                   [id number]                   RCHOL/

HDL                 

                                        3.55 RATIO                   09/10/2015 

               

 

                    LIPID GROUP                   [id number]                   NON-HD

L CH                

                                        107 MG/DL                   09/10/2015  

              

 

                    GLYCOSYLATED HEMOGLOBIN TEST                   66765        

           A1C HPLC 

                    23296-2                   5.5 %                   09/10/2015

   

            

 

                FREE T4                   36643                   FREE T4       

                

                          1.39 NG/DL                   09/10/2015               

 

 

                GFR CALC                   3884715                   GFR AA     

                

                          55.0L ML/MIN                   09/10/2015             

   

 

                    GFR CALC                   [id number]                   GFR NON

-AA                 

                                        46.0L ML/MIN                   09/10/201

5                

 

                    COMPREHENSIVE METABOLIC                   54738             

      AST           

                                        17 U/L                   09/10/2015     

           

 

                    COMPREHENSIVE METABOLIC                   70014             

      ALT           

                                        10 IU/L                   09/10/2015    

            

 

                    COMPREHENSIVE METABOLIC                   81246             

      BUN           

                                        20 MG/DL                   09/10/2015   

             

 

                    COMPREHENSIVE METABOLIC                   27670             

      ALBUMIN       

                                        3.9 GM/DL                   09/10/2015  

          

   

 

                    COMPREHENSIVE METABOLIC                   11920             

      CHLORIDE      

                                        111 MMOL/L                   09/10/2015 

         

     

 

                    COMPREHENSIVE METABOLIC                   16851             

      BILI TOT      

                                        0.4 MG/DL                   09/10/2015  

         

    

 

                    COMPREHENSIVE METABOLIC                   56940             

      ALK PHOS      

                                        70 U/L                   09/10/2015     

         

 

 

                    COMPREHENSIVE METABOLIC                   99256             

      SODIUM        

                                        142 MMOL/L                   09/10/2015 

           

   

 

                    COMPREHENSIVE METABOLIC                   10376             

      CREATININE    

                                        1.16 MG/DL                   09/10/2015 

       

       

 

                    COMPREHENSIVE METABOLIC                   06437             

      CALCIUM       

                                        9.4 MG/DL                   09/10/2015  

          

   

 

                    COMPREHENSIVE METABOLIC                   58244             

      POTASSIUM     

                                        4.6 MMOL/L                   09/10/2015 

        

      

 

                    COMPREHENSIVE METABOLIC                   25143             

      PROT TOT      

                                        6.2 GM/DL                   09/10/2015  

         

    

 

                    COMPREHENSIVE METABOLIC                   23241             

      Glucose       

                                        90 MG/DL                   09/10/2015   

          

  

 

                    COMPREHENSIVE METABOLIC                   34744             

      BICARB        

                                        24 MMOL/L                   09/10/2015  

           

  

 

                    COMPREHENSIVE METABOLIC                   89731             

      ANION GAP     

                                        7 MEQ/L                   09/10/2015    

        

   

 

                    THYROID STIMULATING HORMONE                   89895         

          TSH       

                                        2.427 uIU/ML                   

5         

      

 

                    LIPID GROUP                   [id number]                   HDL TE

ST                  

                                        47 MG/DL                   2015   

             

 

                LIPID GROUP                   [id number]                   TRIG      

                

                          145 MG/DL                   2015                

 

                    LIPID GROUP                   [id number]                   TEST L

DL                  

                                        73 MG/DL                   2015   

             

 

                LIPID GROUP                   [id number]                   CHOL      

                

                          149 MG/DL                   2015                

 

                    LIPID GROUP                   [id number]                   RCHOL/

HDL                 

                                        3.17 RATIO                   2015 

               

 

                    LIPID GROUP                   [id number]                   NON-HD

L CH                

                                        102 MG/DL                   2015  

              

 

                    COMPREHENSIVE METABOLIC                   38601             

      AST           

                                        17 U/L                   2015     

           

 

                    COMPREHENSIVE METABOLIC                   68672             

      ALT           

                                        9 IU/L                   2015     

           

 

                    COMPREHENSIVE METABOLIC                   14313             

      BUN           

                                        19 MG/DL                   2015   

             

 

                    COMPREHENSIVE METABOLIC                   01113             

      ALBUMIN       

                                        4.3 GM/DL                   2015  

          

   

 

                    COMPREHENSIVE METABOLIC                   80960             

      CHLORIDE      

                                        108 MMOL/L                   2015 

         

     

 

                    COMPREHENSIVE METABOLIC                   62809             

      BILI TOT      

                                        0.5 MG/DL                   2015  

         

    

 

                    COMPREHENSIVE METABOLIC                   40482             

      ALK PHOS      

                                        68 U/L                   2015     

         

 

 

                    COMPREHENSIVE METABOLIC                   64733             

      SODIUM        

                                        140 MMOL/L                   2015 

           

   

 

                    COMPREHENSIVE METABOLIC                   35867             

      CREATININE    

                                        1.08 MG/DL                   2015 

       

       

 

                    COMPREHENSIVE METABOLIC                   12842             

      CALCIUM       

                                        9.9 MG/DL                   2015  

          

   

 

                    COMPREHENSIVE METABOLIC                   02808             

      POTASSIUM     

                                        4.3 MMOL/L                   2015 

        

      

 

                    COMPREHENSIVE METABOLIC                   31946             

      PROT TOT      

                                        7.2 GM/DL                   2015  

         

    

 

                    COMPREHENSIVE METABOLIC                   23460             

      Glucose       

                                        94 MG/DL                   2015   

          

  

 

                    COMPREHENSIVE METABOLIC                   82771             

      BICARB        

                                        26 MMOL/L                   2015  

           

  

 

                    COMPREHENSIVE METABOLIC                   40829             

      ANION GAP     

                                        6 MEQ/L                   2015    

        

   

 

                GFR CALC                   0596920                   GFR AA     

                

                          60.0L ML/MIN                   2015             

   

 

                    GFR CALC                   2353716                   GFR NON

-AA                 

                                        49.0L ML/MIN                   

5                

 

                    GLYCOSYLATED HEMOGLOBIN TEST                   11628        

           A1C HPLC 

                    25249-0                   5.6 %                   2015

   

            

 

                    COMPLETE BLOOD COUNT                   [id number]              

     WBC            

                                        7.2 10e9/L                   2015 

               

 

                    COMPLETE BLOOD COUNT                   [id number]              

     RBC            

                                        4.28 10e12/L                   

5              

 

 

                    COMPLETE BLOOD COUNT                   [id number]              

     HGB            

                                        12.8 g/dL                   2015  

              

 

                    COMPLETE BLOOD COUNT                   [id number]              

     HCT DET        

                                        39.3 %                   2015     

           

 

                    COMPLETE BLOOD COUNT                   [id number]              

     MCV            

                                        91.8 fL                   2015    

            

 

                    COMPLETE BLOOD COUNT                   1277132              

     MCH            

                                        29.9 pg                   2015    

            

 

                    COMPLETE BLOOD COUNT                   [id number]              

     MCHC           

                                        32.6 g/dL                   2015  

              

 

                    COMPLETE BLOOD COUNT                   [id number]              

     PLT            

                                        189 10e9/L                   2015 

               

 

                    COMPLETE BLOOD COUNT                   [id number]              

     MPV            

                                        11.2 fL                   2015    

            

 

                    COMPLETE BLOOD COUNT                   [id number]              

     ARIK %          

                                        38.0 %                   2015     

           

 

                    COMPLETE BLOOD COUNT                   [id number]              

     LY %           

                                        51.0 %                   2015     

           

 

                    COMPLETE BLOOD COUNT                   4889574              

     MON %          

                                        7.7 %                   2015      

          

 

                    COMPLETE BLOOD COUNT                   0087051              

     EOS  %         

                                        2.9 %                   2015      

          

 

                    COMPLETE BLOOD COUNT                   5166813              

     BASO %         

                                        0.4 %                   2015      

          

 

                    COMPLETE BLOOD COUNT                   [id number]              

     RDW            

                                        14.0 %                   2015     

           

 

                    COMPLETE BLOOD COUNT                   [id number]              

     ABS ARIK        

                                        2.74 10e9/L                   2015

           

    

 

                    COMPLETE BLOOD COUNT                   [id number]              

     ABS LYMPH      

                                        3.67 10e9/L                   2015

         

      

 

                    COMPLETE BLOOD COUNT                   [id number]              

     ABS MONO       

                                        0.55 10e9/L                   2015

          

     

 

                    COMPLETE BLOOD COUNT                   [id number]              

     ABS EOS        

                                        0.21 10e9/L                   2015

           

    

 

                    COMPLETE BLOOD COUNT                   [id number]              

     ABS BASO       

                                        0.03 10e9/L                   2015

          

     

 

                    COMPLETE BLOOD COUNT                   5356535              

     RDW-SD         

                                        46.1 fL                   2015    

            

 

                FREE T4                   09360                   FREE T4       

                

                          1.14 NG/DL                   2015               

 

 

                    GLYCOSYLATED HEMOGLOBIN TEST                   67074        

           A1C HPLC 

                    78765-7                   5.2 %                   2014

   

            

 

                FREE T4                   36967                   FREE T4       

                

                          1.40 NG/DL                   2014               

 

 

                GFR CALC                   1184314                   GFR AA     

                

                          >60 ML/MIN                   2014               

 

 

                    GFR CALC                   2606931                   GFR NON

-AA                 

                                        52.0L ML/MIN                   

4                

 

                    COMPREHENSIVE METABOLIC                   76582             

      AST           

                                        15 U/L                   2014     

           

 

                    COMPREHENSIVE METABOLIC                   49369             

      ALT           

                                        9 IU/L                   2014     

           

 

                    COMPREHENSIVE METABOLIC                   59958             

      BUN           

                                        17 MG/DL                   2014   

             

 

                    COMPREHENSIVE METABOLIC                   13000             

      ALBUMIN       

                                        4.0 GM/DL                   2014  

          

   

 

                    COMPREHENSIVE METABOLIC                   82083             

      CHLORIDE      

                                        112 MMOL/L                   2014 

         

     

 

                    COMPREHENSIVE METABOLIC                   64825             

      BILI TOT      

                                        0.5 MG/DL                   2014  

         

    

 

                    COMPREHENSIVE METABOLIC                   85726             

      ALK PHOS      

                                        66 U/L                   2014     

         

 

 

                    COMPREHENSIVE METABOLIC                   63882             

      SODIUM        

                                        140 MMOL/L                   2014 

           

   

 

                    COMPREHENSIVE METABOLIC                   43391             

      CREATININE    

                                        1.03 MG/DL                   2014 

       

       

 

                    COMPREHENSIVE METABOLIC                   17631             

      CALCIUM       

                                        9.5 MG/DL                   2014  

          

   

 

                    COMPREHENSIVE METABOLIC                   35585             

      POTASSIUM     

                                        4.1 MMOL/L                   2014 

        

      

 

                    COMPREHENSIVE METABOLIC                   89175             

      PROT TOT      

                                        6.2 GM/DL                   2014  

         

    

 

                    COMPREHENSIVE METABOLIC                   44174             

      Glucose       

                                        102 MG/DL                   2014  

          

   

 

                    COMPREHENSIVE METABOLIC                   60204             

      BICARB        

                                        23 MMOL/L                   2014  

           

  

 

                    COMPREHENSIVE METABOLIC                   86536             

      ANION GAP     

                                        5 MEQ/L                   2014    

        

   

 

                    THYROID STIMULATING HORMONE                   72986         

          TSH       

                                        2.074 uIU/ML                   

4         

      

 

                    VITAMIN B 12 FOLIC ACID                   [id number]       

            VIT B 12

                                        423 PG/ML                   2014  

   

          

 

                    VITAMIN B 12 FOLIC ACID                   [id number]       

            FOLIC 

ACID                                       19.7 NG/ML                   

2014                

 

                    LIPID GROUP                   [id number]                   HDL TE

ST                  

                                        40 MG/DL                   2014   

             

 

                LIPID GROUP                   [id number]                   TRIG      

                

                          145 MG/DL                   2014                

 

                    LIPID GROUP                   [id number]                   TEST L

DL                  

                                        81 MG/DL                   2014   

             

 

                LIPID GROUP                   [id number]                   CHOL      

                

                          150 MG/DL                   2014                

 

                    LIPID GROUP                   [id number]                   RCHOL/

HDL                 

                                        3.75 RATIO                   2014 

               

 

                    COMPLETE BLOOD COUNT                   [id number]              

     WBC            

                                        6.0 10e9/L                   2014 

               

 

                    COMPLETE BLOOD COUNT                   [id number]              

     RBC            

                                        4.26 10e12/L                   

4              

 

 

                    COMPLETE BLOOD COUNT                   [id number]              

     HGB            

                                        12.7 g/dL                   2014  

              

 

                    COMPLETE BLOOD COUNT                   [id number]              

     HCT DET        

                                        38.7 %                   2014     

           

 

                    COMPLETE BLOOD COUNT                   [id number]              

     MCV            

                                        90.8 fL                   2014    

            

 

                    COMPLETE BLOOD COUNT                   [id number]              

     MCH            

                                        29.8 pg                   2014    

            

 

                    COMPLETE BLOOD COUNT                   7655752              

     MCHC           

                                        32.8 g/dL                   2014  

              

 

                    COMPLETE BLOOD COUNT                   5782139              

     PLT            

                                        178 10e9/L                   2014 

               

 

                    COMPLETE BLOOD COUNT                   0136793              

     MPV            

                                        11.7 fL                   2014    

            

 

                    COMPLETE BLOOD COUNT                   [id number]              

     ARIK %          

                                        30.5 %                   2014     

           

 

                    COMPLETE BLOOD COUNT                   [id number]              

     LY %           

                                        55.4 %                   2014     

           

 

                    COMPLETE BLOOD COUNT                   [id number]              

     MON %          

                                        9.0 %                   2014      

          

 

                    COMPLETE BLOOD COUNT                   1178796              

     EOS  %         

                                        4.4 %                   2014      

          

 

                    COMPLETE BLOOD COUNT                   [id number]              

     BASO %         

                                        0.7 %                   2014      

          

 

                    COMPLETE BLOOD COUNT                   [id number]              

     RDW            

                                        13.3 %                   2014     

           

 

                    COMPLETE BLOOD COUNT                   [id number]              

     ABS ARIK        

                                        1.83 10e9/L                   2014

           

    

 

                    COMPLETE BLOOD COUNT                   [id number]              

     ABS LYMPH      

                                        3.32 10e9/L                   2014

         

      

 

                    COMPLETE BLOOD COUNT                   [id number]              

     ABS MONO       

                                        0.54 10e9/L                   2014

          

     

 

                    COMPLETE BLOOD COUNT                   [id number]              

     ABS EOS        

                                        0.26 10e9/L                   2014

           

    

 

                    COMPLETE BLOOD COUNT                   [id number]              

     ABS BASO       

                                        0.04 10e9/L                   2014

          

     

 

                    COMPLETE BLOOD COUNT                   [id number]              

     RDW-SD         

                                        43.2 fL                   2014    

            

 

                    HEMOGLOBIN A1C (GLYCOSYLATED)                   [id number]     

              A1C 

[facility]                   93770-3                   5.5 %                   

2012                

 

                    COMPLETE BLOOD COUNT                   [id number]              

     WBC            

                                        6.0 10e9/L                   2012 

               

 

                    COMPLETE BLOOD COUNT                   [id number]              

     RBC            

                                        4.22 10e12/L                   

2              

 

 

                    COMPLETE BLOOD COUNT                   [id number]              

     HGB            

                                        12.4 g/dL                   2012  

              

 

                    COMPLETE BLOOD COUNT                   [id number]              

     HCT DET        

                                        38.2 %                   2012     

           

 

                    COMPLETE BLOOD COUNT                   [id number]              

     MCV            

                                        90.5 fL                   2012    

            

 

                    COMPLETE BLOOD COUNT                   [id number]              

     MCH            

                                        29.4 pg                   2012    

            

 

                    COMPLETE BLOOD COUNT                   [id number]              

     MCHC           

                                        32.5 g/dL                   2012  

              

 

                    COMPLETE BLOOD COUNT                   [id number]              

     PLT            

                                        187 10e9/L                   2012 

               

 

                    COMPLETE BLOOD COUNT                   [id number]              

     MPV            

                                        11.5 fL                   2012    

            

 

                    COMPLETE BLOOD COUNT                   [id number]              

     ARIK %          

                                        36.4 %                   2012     

           

 

                    COMPLETE BLOOD COUNT                   [id number]              

     LY %           

                                        51.0 %                   2012     

           

 

                    COMPLETE BLOOD COUNT                   [id number]              

     MON %          

                                        8.7 %                   2012      

          

 

                    COMPLETE BLOOD COUNT                   [id number]              

     EOS  %         

                                        3.2 %                   2012      

          

 

                    COMPLETE BLOOD COUNT                   [id number]              

     BASO %         

                                        0.7 %                   2012      

          

 

                    COMPLETE BLOOD COUNT                   [id number]              

     RDW            

                                        13.7 %                   2012     

           

 

                    COMPLETE BLOOD COUNT                   [id number]              

     ABS ARIK        

                                        2.18 10e9/L                   2012

           

    

 

                    COMPLETE BLOOD COUNT                   [id number]              

     ABS LYMPH      

                                        3.06 10e9/L                   2012

         

      

 

                    COMPLETE BLOOD COUNT                   [id number]              

     ABS MONO       

                                        0.52 10e9/L                   2012

          

     

 

                    COMPLETE BLOOD COUNT                   [id number]              

     ABS EOS        

                                        0.19 10e9/L                   2012

           

    

 

                    COMPLETE BLOOD COUNT                   [id number]              

     ABS BASO       

                                        0.04 10e9/L                   2012

          

     

 

                    COMPLETE BLOOD COUNT                   [id number]              

     RDW-SD         

                                        44.3 fL                   2012    

            

 

                    LIPID GROUP                   [id number]                   HDL TE

ST                  

                                        42 MG/DL                   2012   

             

 

                LIPID GROUP                   [id number]                   TRIG      

                

                          156 MG/DL                   2012                

 

                    LIPID GROUP                   [id number]                   TEST L

DL                  

                                        80 MG/DL                   2012   

             

 

                LIPID GROUP                   [id number]                   CHOL      

                

                          153 MG/DL                   2012                

 

                    LIPID GROUP                   [id number]                   RCHOL/

HDL                 

                                        3.64 RATIO                   2012 

               

 

                FREE T4                   25913                   FREE T4       

                

                          1.22 NG/DL                   2012               

 

 

                    COMPREHENSIVE METABOLIC                   57865             

      AST           

                                        20 U/L                   2012     

           

 

                    COMPREHENSIVE METABOLIC                   13867             

      ALT           

                                        11 IU/L                   2012    

            

 

                    COMPREHENSIVE METABOLIC                   97146             

      BUN           

                                        19 MG/DL                   2012   

             

 

                    COMPREHENSIVE METABOLIC                   13937             

      ALBUMIN       

                                        4.3 GM/DL                   2012  

          

   

 

                    COMPREHENSIVE METABOLIC                   12144             

      CHLORIDE      

                                        109 MMOL/L                   2012 

         

     

 

                    COMPREHENSIVE METABOLIC                   17848             

      BILI TOT      

                                        0.6 MG/DL                   2012  

         

    

 

                    COMPREHENSIVE METABOLIC                   15086             

      ALK PHOS      

                                        84 U/L                   2012     

         

 

 

                    COMPREHENSIVE METABOLIC                   31835             

      SODIUM        

                                        142 MMOL/L                   2012 

           

   

 

                    COMPREHENSIVE METABOLIC                   01153             

      CREATININE    

                                        1.09 MG/DL                   2012 

       

       

 

                    COMPREHENSIVE METABOLIC                   38293             

      CALCIUM       

                                        9.8 MG/DL                   2012  

          

   

 

                    COMPREHENSIVE METABOLIC                   58643             

      POTASSIUM     

                                        4.2 MMOL/L                   2012 

        

      

 

                    COMPREHENSIVE METABOLIC                   55909             

      PROT TOT      

                                        6.4 GM/DL                   2012  

         

    

 

                    COMPREHENSIVE METABOLIC                   59673             

      Glucose       

                                        89 MG/DL                   2012   

          

  

 

                    COMPREHENSIVE METABOLIC                   37916             

      BICARB        

                                        25 MMOL/L                   2012  

           

  

 

                    COMPREHENSIVE METABOLIC                   33688             

      ANION GAP     

                                        8 MEQ/L                   2012    

        

   

 

                GFR CALC                   [id number]                   GFR AA     

                

                          60.0L ML/MIN                   2012             

   

 

                    GFR CALC                   [id number]                   GFR NON

-AA                 

                                        49.0L ML/MIN                   

2                

 

                    THYROID STIMULATING HORMONE                   50649         

          TSH       

                                        2.450 uIU/ML                   

2         

      

 

                    COMPREHENSIVE METABOLIC                   73653             

      AST           

                                        22 U/L                   2012     

           

 

                    COMPREHENSIVE METABOLIC                   14011             

      ALT           

                                        14 IU/L                   2012    

            

 

                    COMPREHENSIVE METABOLIC                   85670             

      BUN           

                                        21 MG/DL                   2012   

             

 

                    COMPREHENSIVE METABOLIC                   48163             

      ALBUMIN       

                                        4.3 GM/DL                   2012  

          

   

 

                    COMPREHENSIVE METABOLIC                   71198             

      CHLORIDE      

                                        106 MMOL/L                   2012 

         

     

 

                    COMPREHENSIVE METABOLIC                   24361             

      BILI TOT      

                                        0.4 MG/DL                   2012  

         

    

 

                    COMPREHENSIVE METABOLIC                   67214             

      ALK PHOS      

                                        80 U/L                   2012     

         

 

 

                    COMPREHENSIVE METABOLIC                   05898             

      SODIUM        

                                        141 MMOL/L                   2012 

           

   

 

                    COMPREHENSIVE METABOLIC                   68039             

      CREATININE    

                                        1.13 MG/DL                   2012 

       

       

 

                    COMPREHENSIVE METABOLIC                   50516             

      CALCIUM       

                                        9.4 MG/DL                   2012  

          

   

 

                    COMPREHENSIVE METABOLIC                   86889             

      POTASSIUM     

                                        4.3 MMOL/L                   2012 

        

      

 

                    COMPREHENSIVE METABOLIC                   79478             

      PROT TOT      

                                        6.7 GM/DL                   2012  

         

    

 

                    COMPREHENSIVE METABOLIC                   79129             

      Glucose       

                                        98 MG/DL                   2012   

          

  

 

                    COMPREHENSIVE METABOLIC                   31978             

      BICARB        

                                        25 MMOL/L                   2012  

           

  

 

                    COMPREHENSIVE METABOLIC                   93445             

      ANION GAP     

                                        10 MEQ/L                   2012   

        

    

 

                    LIPID GROUP                   [id number]                   HDL TE

ST                  

                                        44 MG/DL                   2012   

             

 

                LIPID GROUP                   [id number]                   TRIG      

                

                          164 MG/DL                   2012                

 

                    LIPID GROUP                   [id number]                   TEST L

DL                  

                                        98 MG/DL                   2012   

             

 

                LIPID GROUP                   [id number]                   CHOL      

                

                          175 MG/DL                   2012                

 

                    LIPID GROUP                   [id number]                   RCHOL/

HDL                 

                                        3.98 RATIO                   2012 

               

 

                    COMPLETE BLOOD COUNT                   46065                

   WBC              

                                        6.7 10e9/L                   2012 

               

 

                    COMPLETE BLOOD COUNT                   32528                

   RBC              

                                        4.36 10e12/L                   

2                

 

                    COMPLETE BLOOD COUNT                   32985                

   HGB              

                                        12.9 g/dL                   2012  

              

 

                    COMPLETE BLOOD COUNT                   09918                

   HCT DET          

                                        39.4 %                   2012     

           

 

                    COMPLETE BLOOD COUNT                   31781                

   MCV              

                                        90.4 fL                   2012    

            

 

                    COMPLETE BLOOD COUNT                   91165                

   MCH              

                                        29.6 pg                   2012    

            

 

                    COMPLETE BLOOD COUNT                   72531                

   MCHC             

                                        32.7 g/dL                   2012  

              

 

                    COMPLETE BLOOD COUNT                   97203                

   PLT              

                                        184 10e9/L                   2012 

               

 

                    COMPLETE BLOOD COUNT                   59785                

   MPV              

                                        10.9 fL                   2012    

            

 

                    COMPLETE BLOOD COUNT                   29663                

   ARIK %            

                                        41.5 %                   2012     

           

 

                    COMPLETE BLOOD COUNT                   91197                

   LY %             

                                        45.7 %                   2012     

           

 

                    COMPLETE BLOOD COUNT                   75484                

   MON %            

                                        9.4 %                   2012      

          

 

                    COMPLETE BLOOD COUNT                   49118                

   EOS  %           

                                        3.0 %                   2012      

          

 

                    COMPLETE BLOOD COUNT                   31262                

   BASO %           

                                        0.4 %                   2012      

          

 

                    COMPLETE BLOOD COUNT                   20710                

   RDW              

                                        13.2 %                   2012     

           

 

                    COMPLETE BLOOD COUNT                   74691                

   ABS ARIK          

                                        2.78 10e9/L                   2012

             

  

 

                    COMPLETE BLOOD COUNT                   18299                

   ABS LYMPH        

                                        3.06 10e9/L                   2012

           

    

 

                    COMPLETE BLOOD COUNT                   72071                

   ABS MONO         

                                        0.63 10e9/L                   2012

            

   

 

                    COMPLETE BLOOD COUNT                   91280                

   ABS EOS          

                                        0.20 10e9/L                   2012

             

  

 

                    COMPLETE BLOOD COUNT                   57132                

   ABS BASO         

                                        0.03 10e9/L                   2012

            

   

 

                    COMPLETE BLOOD COUNT                   48641                

   RDW-SD           

                                        42.3 fL                   2012    

            

 

                GFR CALC                   [id number]                   GFR AA     

                

                          57.0L ML/MIN                   2012             

   

 

                    GFR CALC                   [id number]                   GFR NON

-AA                 

                                        47.0L ML/MIN                   

2                

 

                    THYROID STIMULATING HORMONE                   33161         

          TSH       

                                        2.663 uIU/ML                   

2         

      

 

                FREE T4                   39182                   FREE T4       

                

                          1.15 NG/DL                   2012               

 

 

                    THYROID STIMULATING HORMONE                   92935         

          TSH       

                                        1.908 uIU/ML                   

1         

      

 

                    COMPLETE BLOOD COUNT                   56427                

   WBC              

                                        6.4 10e9/L                   2011 

               

 

                    COMPLETE BLOOD COUNT                   76610                

   RBC              

                                        3.92 10e12/L                   

1                

 

                    COMPLETE BLOOD COUNT                   25382                

   HGB              

                                        11.8 g/dL                   2011  

              

 

                    COMPLETE BLOOD COUNT                   32946                

   HCT DET          

                                        36.0 %                   2011     

           

 

                    COMPLETE BLOOD COUNT                   78939                

   MCV              

                                        91.8 fL                   2011    

            

 

                    COMPLETE BLOOD COUNT                   73579                

   MCH              

                                        30.1 pg                   2011    

            

 

                    COMPLETE BLOOD COUNT                   73564                

   MCHC             

                                        32.8 g/dL                   2011  

              

 

                    COMPLETE BLOOD COUNT                   94572                

   PLT              

                                        176 10e9/L                   2011 

               

 

                    COMPLETE BLOOD COUNT                   00590                

   MPV              

                                        11.4 fL                   2011    

            

 

                    COMPLETE BLOOD COUNT                   36543                

   ARIK %            

                                        50.4 %                   2011     

           

 

                    COMPLETE BLOOD COUNT                   37848                

   LY %             

                                        35.5 %                   2011     

           

 

                    COMPLETE BLOOD COUNT                   99248                

   MON %            

                                        10.2 %                   2011     

           

 

                    COMPLETE BLOOD COUNT                   43757                

   EOS  %           

                                        3.3 %                   2011      

          

 

                    COMPLETE BLOOD COUNT                   51018                

   BASO %           

                                        0.6 %                   2011      

          

 

                    COMPLETE BLOOD COUNT                   14029                

   RDW              

                                        13.7 %                   2011     

           

 

                    COMPLETE BLOOD COUNT                   75509                

   ABS ARIK          

                                        3.23 10e9/L                   2011

             

  

 

                    COMPLETE BLOOD COUNT                   88662                

   ABS LYMPH        

                                        2.27 10e9/L                   2011

           

    

 

                    COMPLETE BLOOD COUNT                   52630                

   ABS MONO         

                                        0.65 10e9/L                   2011

            

   

 

                    COMPLETE BLOOD COUNT                   89978                

   ABS EOS          

                                        0.21 10e9/L                   2011

             

  

 

                    COMPLETE BLOOD COUNT                   68887                

   ABS BASO         

                                        0.04 10e9/L                   2011

            

   

 

                    COMPLETE BLOOD COUNT                   31705                

   RDW-SD           

                                        45.3 fL                   2011    

            

 

                GFR CALC                   [id number]                   GFR AA     

                

                          >60 ML/MIN                   2011               

 

 

                    GFR CALC                   4058236                   GFR NON

-AA                 

                                        53.0L ML/MIN                   

1                

 

                FREE T4                   [id number]                   FREE T4       

                

                          1.20 NG/DL                   2011               

 

 

                    COMPREHENSIVE METABOLIC                   05152             

      AST           

                                        17 U/L                   2011     

           

 

                    COMPREHENSIVE METABOLIC                   72775             

      ALT           

                                        9 IU/L                   2011     

           

 

                    COMPREHENSIVE METABOLIC                   70500             

      BUN           

                                        16 MG/DL                   2011   

             

 

                    COMPREHENSIVE METABOLIC                   76111             

      ALBUMIN       

                                        4.0 GM/DL                   2011  

          

   

 

                    COMPREHENSIVE METABOLIC                   91118             

      CHLORIDE      

                                        108 MMOL/L                   2011 

         

     

 

                    COMPREHENSIVE METABOLIC                   28383             

      BILI TOT      

                                        0.5 MG/DL                   2011  

         

    

 

                    COMPREHENSIVE METABOLIC                   28068             

      ALK PHOS      

                                        76 U/L                   2011     

         

 

 

                    COMPREHENSIVE METABOLIC                   02263             

      SODIUM        

                                        139 MMOL/L                   2011 

           

   

 

                    COMPREHENSIVE METABOLIC                   39517             

      CREATININE    

                                        1.02 MG/DL                   2011 

       

       

 

                    COMPREHENSIVE METABOLIC                   65520             

      CALCIUM       

                                        9.2 MG/DL                   2011  

          

   

 

                    COMPREHENSIVE METABOLIC                   85849             

      POTASSIUM     

                                        4.5 MMOL/L                   2011 

        

      

 

                    COMPREHENSIVE METABOLIC                   38569             

      PROT TOT      

                                        6.1 GM/DL                   2011  

         

    

 

                    COMPREHENSIVE METABOLIC                   31448             

      Glucose       

                                        93 MG/DL                   2011   

          

  

 

                    COMPREHENSIVE METABOLIC                   61187             

      BICARB        

                                        26 MMOL/L                   2011  

           

  

 

                    COMPREHENSIVE METABOLIC                   14421             

      ANION GAP     

                                        5 MEQ/L                   2011    

        

   

 

                    LIPID GROUP                   [id number]                   HDL TE

ST                  

                                        46 MG/DL                   2011   

             

 

                LIPID GROUP                   [id number]                   TRIG      

                

                          102 MG/DL                   2011                

 

                    LIPID GROUP                   [id number]                   TEST L

DL                  

                                        88 MG/DL                   2011   

             

 

                LIPID GROUP                   [id number]                   CHOL      

                

                          154 MG/DL                   2011                

 

                    LIPID GROUP                   [id number]                   RCHOL/

HDL                 

                                        3.35 RATIO                   2011 

               



                                                                                
                                                                                
                                                                                
                                                                                
                                                                                
                                                                                
                                                                                
                                                                                
                                                                                
                                                                                
                                                          



Review of Systems

                      



                    System                   Result                   Effective 

Dates               



 

                    Constitutional                   No fatigue                 

  2019        

       

 

                    Constitutional                   No fever                   

2019          

     

 

                    Ears/Nose/Throat/Neck                   No dizziness        

           

2019                

 

                    Ears/Nose/Throat/Neck                   No headache         

          2019

               

 

                    Ears/Nose/Throat/Neck                   No nasal discharge  

                 

2019                

 

                    Ears/Nose/Throat/Neck                   No otalgia          

         2019 

              

 

                    Ears/Nose/Throat/Neck                   No sinus congestion 

                  

2019                

 

                    Ears/Nose/Throat/Neck                   No sinusitis        

           

2019                

 

                    Ears/Nose/Throat/Neck                   No postnasal drip   

                

2019                

 

                    Ears/Nose/Throat/Neck                   No otorrhea         

          2019

               

 

                    Ears/Nose/Throat/Neck                   No sore throat      

             

2019                

 

                    Ears/Nose/Throat/Neck                   hearing loss        

           

2019                

 

                    Respiratory                   No cough                   2019             

  

 

                    Respiratory                   No dyspnea                   0

2019           

    

 

                    Gastrointestinal                   No abdominal pain        

           

2019                

 

                    Gastrointestinal                   No constipation          

         2019 

              

 

                    Gastrointestinal                   No diarrhea              

     2019     

          

 

                    Dermatologic                   No rash                   2019             

  

 

                    Dermatologic                   No sores                               

   

 

                    Musculoskeletal                   No myalgias               

    2019      

         

 

                    Cardiovascular                   arrhythmia                 

  06/10/2019        

       

 

                    Cardiovascular                   No chest pain/pressure     

              

06/10/2019                

 

                    Cardiovascular                   No edema                   

06/10/2019          

     

 

                    Cardiovascular                   No exercise intolerance    

               

06/10/2019                

 

                    Cardiovascular                   No orthopnea               

    06/10/2019      

         

 

                    Cardiovascular                   No palpitations            

       06/10/2019   

            

 

                    Cardiovascular                   hypertension               

    06/10/2019      

         

 

                    Gastrointestinal                   gastroesophageal reflux  

                 

06/10/2019                

 

                    Respiratory                   No asthma                   06

/10/2019            

   

 

                    Respiratory                   No cough                   06/

10/2019             

  

 

                    Respiratory                   No dyspnea                   0

6/10/2019           

    

 

                    Respiratory                   No pleuritic pain             

      06/10/2019    

           

 

                    Respiratory                   No productive sputum          

         06/10/2019 

              

 

                    Respiratory                   No wheezing                   

06/10/2019          

     

 

                    Musculoskeletal                   back pain                 

  06/10/2019        

       

 

                    Constitutional                   fatigue                   0

6/10/2019           

    

 

                    Gastrointestinal                   No hemorrhoids           

        2019  

             

 

                    Gastrointestinal                   No hepatitis             

      2019    

           

 

                    Gastrointestinal                   No abdominal pain        

           

2019                

 

                    Gastrointestinal                   No constipation          

         2019 

              

 

                    Gastrointestinal                   No diarrhea              

     2019     

          

 

                    Gastrointestinal                   No gastroesophageal reflu

x                   

2019                

 

                    Gastrointestinal                   No melena                

   2019       

        

 

                    Gastrointestinal                   No nausea                

   2019       

        

 

                    Gastrointestinal                   No vomiting              

     2019     

          

 

                    Cardiovascular                   hypertension               

    2019      

         

 

                    Cardiovascular                   palpitations               

    2019      

         

 

                    Respiratory                   No asthma                               

   

 

                    Respiratory                   No cough                   2019             

  

 

                    Respiratory                   No dyspnea                   0

3/06/2019           

    

 

                    Respiratory                   No pleuritic pain             

      2019    

           

 

                    Respiratory                   No productive sputum          

         2019 

              

 

                    Respiratory                   No wheezing                   

2019          

     

 

                          Genitourinary/Nephrology                   No urinary 

retention/hesitancy       

                                        2019                

 

                    Gastrointestinal                   abdominal pain           

        2018  

             

 

                    Gastrointestinal                   dyspepsia                

   2018       

        

 

                    Gastrointestinal                   gastroesophageal reflux  

                 

2018                

 

                    Ears/Nose/Throat/Neck                   No hearing loss     

              

2018                

 

                    Ears/Nose/Throat/Neck                   No nasal discharge  

                 

2018                

 

                    Ears/Nose/Throat/Neck                   No sinus congestion 

                  

2018                

 

                    Ears/Nose/Throat/Neck                   No sore throat      

             

2018                

 

                    Cardiovascular                   No arrhythmia              

     2018     

          

 

                    Cardiovascular                   No chest pain/pressure     

              

2018                

 

                    Cardiovascular                   No edema                   

2018          

     

 

                    Cardiovascular                   No exercise intolerance    

               

2018                

 

                    Cardiovascular                   No orthopnea               

    2018      

         

 

                    Cardiovascular                   No palpitations            

       2018   

            

 

                    Cardiovascular                   hypertension               

    2018      

         

 

                    Respiratory                   No asthma                               

   

 

                    Respiratory                   No cough                   2018             

  

 

                    Respiratory                   No dyspnea                   1

2018           

    

 

                    Respiratory                   No pleuritic pain             

      2018    

           

 

                    Respiratory                   No productive sputum          

         2018 

              

 

                    Respiratory                   No wheezing                   

2018          

     

 

                    Gastrointestinal                   No hemorrhoids           

        2018  

             

 

                    Gastrointestinal                   No hepatitis             

      2018    

           

 

                    Gastrointestinal                   No abdominal pain        

           

2018                

 

                    Gastrointestinal                   No constipation          

         2018 

              

 

                    Gastrointestinal                   No diarrhea              

     2018     

          

 

                    Gastrointestinal                   No gastroesophageal reflu

x                   

2018                

 

                    Gastrointestinal                   No melena                

   2018       

        

 

                    Gastrointestinal                   No nausea                

   2018       

        

 

                    Gastrointestinal                   No vomiting              

     2018     

          

 

                    Genitourinary/Nephrology                   No dysuria       

            

2018                

 

                    Genitourinary/Nephrology                   No nocturia      

             

2018                

 

                          Genitourinary/Nephrology                   No urinary 

incontinence              

                                        2018                

 

                    Musculoskeletal                   No muscle weakness        

           

2018                

 

                    Musculoskeletal                   No myalgias               

    2018      

         

 

                    Musculoskeletal                   No stiffness              

     2018     

          

 

                    Musculoskeletal                   No swelling               

    2018      

         

 

                    Dermatologic                   No rash                   2018             

  

 

                    Dermatologic                   No scar                   2018             

  

 

                    Neurologic                   No dizziness                   

2018          

     

 

                    Neurologic                   No headache                   1

2018           

    

 

                    Neurologic                   No neck pain                   

2018          

     

 

                    Neurologic                   No syncope                               

   

 

                    Psychiatric                   anxiety                   2018              

 

 

                    Psychiatric                   No depression                 

  2018        

       

 

                    Endocrine                   No goiter                   2018              

 

 

                    Endocrine                   No hyperglycemia                

   2018       

        

 

                    Endocrine                   No hypoglycemia                 

  2018        

       

 

                    Endocrine                   hyperlipidemia                  

 2018         

      

 

                    Psychiatric                   stress                                  



 

                    Constitutional                   fatigue                   1

2018           

    

 

                    Gastrointestinal                   gastroesophageal reflux  

                 

10/02/2018                

 

                    Gastrointestinal                   abdominal pain           

        10/02/2018  

             

 

                    Cardiovascular                   fatigue                   1

           

    

 

                    Constitutional                   fatigue                   1

           

    

 

                    Cardiovascular                   palpitations               

    10/02/2018      

         

 

                    Cardiovascular                   arrhythmia                 

  10/02/2018        

       

 

                    Respiratory                   No asthma                   10

/2018            

   

 

                    Respiratory                   No cough                   10/

2018             

  

 

                    Respiratory                   No dyspnea                   1

           

    

 

                    Respiratory                   No pleuritic pain             

      10/02/2018    

           

 

                    Respiratory                   No productive sputum          

         10/02/2018 

              

 

                    Respiratory                   No wheezing                   

10/02/2018          

     

 

                    Musculoskeletal                   No muscle weakness        

           

10/02/2018                

 

                    Musculoskeletal                   No myalgias               

    10/02/2018      

         

 

                    Musculoskeletal                   No stiffness              

     10/02/2018     

          

 

                    Musculoskeletal                   No swelling               

    10/02/2018      

         

 

                    Neurologic                   No dizziness                   

10/02/2018          

     

 

                    Neurologic                   No headache                   1

           

    

 

                    Neurologic                   No neck pain                   

10/02/2018          

     

 

                    Neurologic                   No syncope                   10

/2018            

   

 

                    Psychiatric                   anxiety                   10/0

2018              

 

 

                    Psychiatric                   No depression                 

  10/02/2018        

       

 

                    Psychiatric                   stress                   10/02

/2018               



 

                    Gastrointestinal                   gastroesophageal reflux  

                 

2018                

 

                    Gastrointestinal                   abdominal pain           

        2018  

             

 

                    Respiratory                   No asthma                               

   

 

                    Respiratory                   No cough                                

  

 

                    Respiratory                   No dyspnea                   0

2018           

    

 

                    Respiratory                   No pleuritic pain             

      2018    

           

 

                    Respiratory                   No productive sputum          

         2018 

              

 

                    Respiratory                   No wheezing                   

2018          

     

 

                    Cardiovascular                   chest pain/pressure        

           

2018                

 

                    Psychiatric                   anxiety                                 

 

 

                    Musculoskeletal                   No muscle weakness        

           

2018                

 

                    Musculoskeletal                   No myalgias               

    2018      

         

 

                    Musculoskeletal                   No stiffness              

     2018     

          

 

                    Musculoskeletal                   No swelling               

    2018      

         

 

                    Psychiatric                   stress                                  



 

                    Constitutional                   fatigue                   0

2018           

    

 

                    Cardiovascular                   hypertension               

    2018      

         

 

                    Psychiatric                   anxiety                                 

 

 

                    Psychiatric                   stress                                  



 

                    Neurologic                   headache                                 

 

 

                    Neurologic                   dizziness                                

  

 

                    Constitutional                   fatigue                   0

2018           

    

 

                    Neurologic                   dizziness                                

  

 

                    Cardiovascular                   hypertension               

    2018      

         

 

                    Cardiovascular                   arrhythmia                 

  2018        

       

 

                    Constitutional                   No chills                  

 2018         

      

 

                    Constitutional                   No fever                   

2018          

     

 

                    Constitutional                   No malaise                 

  2018        

       

 

                    Musculoskeletal                   No muscle weakness        

           

2018                

 

                    Musculoskeletal                   No myalgias               

    2018      

         

 

                    Musculoskeletal                   No stiffness              

     2018     

          

 

                    Musculoskeletal                   No swelling               

    2018      

         

 

                    Neurologic                   No dizziness                   

2018          

     

 

                    Neurologic                   No headache                   0

2018           

    

 

                    Neurologic                   No neck pain                   

2018          

     

 

                    Neurologic                   No syncope                               

   

 

                    Constitutional                   No fever                   

2018          

     

 

                    Constitutional                   No fatigue                 

  2018        

       

 

                    Ears/Nose/Throat/Neck                   otalgia             

      2018    

           

 

                    Ears/Nose/Throat/Neck                   No postnasal drip   

                

2018                

 

                    Ears/Nose/Throat/Neck                   No otorrhea         

          2018

               

 

                    Ears/Nose/Throat/Neck                   No sore throat      

             

2018                

 

                    Ears/Nose/Throat/Neck                   No sinusitis        

           

2018                

 

                    Ears/Nose/Throat/Neck                   No sinus congestion 

                  

2018                

 

                    Respiratory                   No cough                                

  

 

                    Gastrointestinal                   No abdominal pain        

           

2018                

 

                    Gastrointestinal                   No constipation          

         2018 

              

 

                    Neurologic                   No headache                   0

3/26/2018           

    

 

                    Neurologic                   dizziness                                

  

 

                    Neurologic                   No tinnitus                   0

3/26/2018           

    

 

                    Neurologic                   vertigo                                  



 

                    Musculoskeletal                   back pain                 

  2018        

       

 

                    Musculoskeletal                   joint complaint           

        2018  

             

 

                    Gastrointestinal                   abdominal pain           

        2018  

             

 

                    Musculoskeletal                   No muscle weakness        

           

2017                

 

                    Musculoskeletal                   No myalgias               

    2017      

         

 

                    Musculoskeletal                   No stiffness              

     2017     

          

 

                    Musculoskeletal                   No swelling               

    2017      

         

 

                    Musculoskeletal                   low back pain             

      2017    

           

 

                    Musculoskeletal                   thoracic back pain        

           

2017                

 

                    Neurologic                   gait abnormality               

    2017      

         

 

                    Musculoskeletal                   low back pain             

      2017    

           

 

                    Gastrointestinal                   abdominal pain           

        2017  

             

 

                    Constitutional                   No night sweats            

       2017   

            

 

                    Constitutional                   No chills                  

 2017         

      

 

                    Constitutional                   No fatigue                 

  2017        

       

 

                    Constitutional                   No fever                   

2017          

     

 

                    Eyes                   No eye discharge                               

   

 

                    Eyes                   No eye pain                   

017                

 

                    Eyes                   No vision change                               

   

 

                    Ears/Nose/Throat/Neck                   No dizziness        

           

2017                

 

                          Ears/Nose/Throat/Neck                   eustachian tub

e dysfunction             

                                        2017                

 

                    Ears/Nose/Throat/Neck                   No headache         

          2017

               

 

                    Ears/Nose/Throat/Neck                   nasal discharge     

              

2017                

 

                    Ears/Nose/Throat/Neck                   postnasal drip      

             

2017                

 

                    Ears/Nose/Throat/Neck                   sinus congestion    

               

2017                

 

                    Ears/Nose/Throat/Neck                   sore throat         

          2017

               

 

                    Cardiovascular                   No chest pain/pressure     

              

2017                

 

                    Cardiovascular                   No dyspnea                 

  2017        

       

 

                    Cardiovascular                   No orthopnea               

    2017      

         

 

                    Cardiovascular                   No palpitations            

       2017   

            

 

                    Cardiovascular                   No syncope                 

  2017        

       

 

                    Respiratory                   No chest tightness            

       2017   

            

 

                    Respiratory                   cough                   2017                

 

                    Respiratory                   No dyspnea                   0

2017           

    

 

                    Respiratory                   No wheezing                   

2017          

     

 

                    Gastrointestinal                   No diarrhea              

     2017     

          

 

                    Gastrointestinal                   No nausea                

   2017       

        

 

                    Gastrointestinal                   No vomiting              

     2017     

          

 

                          Hematologic/Lymphatic                   No lymph node 

enlargement/mass          

                                        2017                

 

                    Ears/Nose/Throat/Neck                   No facial pain      

             

2017                

 

                    Respiratory                   No chest congestion           

        2017  

             

 

                    Dermatologic                   No rash                   2017             

  

 

                    Dermatologic                   No scar                   2017             

  

 

                    Constitutional                   fatigue                   1

2016           

    

 

                    Constitutional                   fever                   2016             

  

 

                    Ears/Nose/Throat/Neck                   otalgia             

      2016    

           

 

                    Ears/Nose/Throat/Neck                   No sinus congestion 

                  

2016                

 

                    Ears/Nose/Throat/Neck                   No sinusitis        

           

2016                

 

                    Respiratory                   No asthma                               

   

 

                    Respiratory                   No cough                   2016             

  

 

                    Respiratory                   No dyspnea                   1

2016           

    

 

                    Respiratory                   No pleuritic pain             

      2016    

           

 

                    Respiratory                   No productive sputum          

         2016 

              

 

                    Respiratory                   No wheezing                   

2016          

     

 

                    Dermatologic                   No rash                   2016             

  

 

                    Dermatologic                   No scar                   2016             

  

 

                    Gastrointestinal                   No hemorrhoids           

        2016  

             

 

                    Gastrointestinal                   No hepatitis             

      2016    

           

 

                    Gastrointestinal                   No abdominal pain        

           

2016                

 

                    Gastrointestinal                   constipation             

      2016    

           

 

                    Gastrointestinal                   No diarrhea              

     2016     

          

 

                    Gastrointestinal                   No gastroesophageal reflu

x                   

2016                

 

                    Gastrointestinal                   No melena                

   2016       

        

 

                    Gastrointestinal                   No nausea                

   2016       

        

 

                    Gastrointestinal                   No vomiting              

     2016     

          

 

                    Cardiovascular                   No arrhythmia              

     2016     

          

 

                    Cardiovascular                   No chest pain/pressure     

              

2016                

 

                    Cardiovascular                   No edema                   

2016          

     

 

                    Cardiovascular                   No exercise intolerance    

               

2016                

 

                    Cardiovascular                   No orthopnea               

    2016      

         

 

                    Cardiovascular                   No palpitations            

       2016   

            

 

                    Cardiovascular                   hypertension               

    2016      

         

 

                    Respiratory                   No asthma                               

   

 

                    Respiratory                   No cough                                

  

 

                    Respiratory                   No dyspnea                   0

3/16/2016           

    

 

                    Respiratory                   No pleuritic pain             

      2016    

           

 

                    Respiratory                   No productive sputum          

         2016 

              

 

                    Respiratory                   No wheezing                   

2016          

     

 

                    Gastrointestinal                   No hemorrhoids           

        2016  

             

 

                    Gastrointestinal                   No hepatitis             

      2016    

           

 

                    Gastrointestinal                   No abdominal pain        

           

2016                

 

                    Gastrointestinal                   No constipation          

         2016 

              

 

                    Gastrointestinal                   No diarrhea              

     2016     

          

 

                    Gastrointestinal                   No gastroesophageal reflu

x                   

2016                

 

                    Gastrointestinal                   No melena                

   2016       

        

 

                    Gastrointestinal                   No nausea                

   2016       

        

 

                    Gastrointestinal                   No vomiting              

     2016     

          

 

                    Genitourinary/Nephrology                   No dysuria       

            

2016                

 

                    Genitourinary/Nephrology                   No nocturia      

             

2016                

 

                          Genitourinary/Nephrology                   No urinary 

incontinence              

                                        2016                

 

                    Musculoskeletal                   No muscle weakness        

           

2016                

 

                    Musculoskeletal                   No myalgias               

    2016      

         

 

                    Musculoskeletal                   No stiffness              

     2016     

          

 

                    Musculoskeletal                   No swelling               

    2016      

         

 

                    Dermatologic                   No rash                                

  

 

                    Dermatologic                   No scar                                

  

 

                    Neurologic                   No dizziness                   

2016          

     

 

                    Neurologic                   No headache                   0

3/16/2016           

    

 

                    Neurologic                   No neck pain                   

2016          

     

 

                    Neurologic                   No syncope                               

   

 

                    Psychiatric                   No anxiety                   0

3/16/2016           

    

 

                    Psychiatric                   No depression                 

  2016        

       

 

                    Endocrine                   No goiter                                 

 

 

                    Endocrine                   No hyperglycemia                

   2016       

        

 

                    Endocrine                   No hypoglycemia                 

  2016        

       

 

                    Ears/Nose/Throat/Neck                   No hearing loss     

              

2016                

 

                    Ears/Nose/Throat/Neck                   No nasal discharge  

                 

2016                

 

                    Ears/Nose/Throat/Neck                   No sinus congestion 

                  

2016                

 

                    Ears/Nose/Throat/Neck                   No sore throat      

             

2016                

 

                    Constitutional                   insomnia                   

2016          

     

 

                    Constitutional                   fatigue                   1

2015           

    

 

                    Constitutional                   fever                                

  

 

                    Ears/Nose/Throat/Neck                   otalgia             

      2015    

           

 

                    Ears/Nose/Throat/Neck                   No sinus congestion 

                  

2015                

 

                    Ears/Nose/Throat/Neck                   No sinusitis        

           

2015                

 

                    Respiratory                   No asthma                               

   

 

                    Respiratory                   No cough                                

  

 

                    Respiratory                   No dyspnea                   1

2015           

    

 

                    Respiratory                   No pleuritic pain             

      2015    

           

 

                    Respiratory                   No productive sputum          

         2015 

              

 

                    Respiratory                   No wheezing                   

2015          

     

 

                    Dermatologic                   No rash                                

  

 

                    Dermatologic                   No scar                                

  

 

                    Neurologic                   dizziness                                

  

 

                    Psychiatric                   No anxiety                   1

2015           

    

 

                    Psychiatric                   No depression                 

  2015        

       

 

                    Endocrine                   No goiter                                 

 

 

                    Endocrine                   No hyperglycemia                

   2015       

        

 

                    Endocrine                   No hypoglycemia                 

  2015        

       

 

                    Musculoskeletal                   No muscle weakness        

           

2015                

 

                    Musculoskeletal                   No myalgias               

    2015      

         

 

                    Musculoskeletal                   No stiffness              

     2015     

          

 

                    Musculoskeletal                   No swelling               

    2015      

         

 

                    Genitourinary/Nephrology                   No dysuria       

            

2015                

 

                    Genitourinary/Nephrology                   No nocturia      

             

2015                

 

                          Genitourinary/Nephrology                   No urinary 

incontinence              

                                        2015                

 

                    Gastrointestinal                   No hemorrhoids           

        2015  

             

 

                    Gastrointestinal                   No hepatitis             

      2015    

           

 

                    Gastrointestinal                   No abdominal pain        

           

2015                

 

                    Gastrointestinal                   No constipation          

         2015 

              

 

                    Gastrointestinal                   No diarrhea              

     2015     

          

 

                    Gastrointestinal                   No gastroesophageal reflu

x                   

2015                

 

                    Gastrointestinal                   No melena                

   2015       

        

 

                    Gastrointestinal                   No nausea                

   2015       

        

 

                    Gastrointestinal                   No vomiting              

     2015     

          

 

                    Cardiovascular                   No arrhythmia              

     2015     

          

 

                    Cardiovascular                   No chest pain/pressure     

              

2015                

 

                    Cardiovascular                   edema                                

  

 

                    Cardiovascular                   No exercise intolerance    

               

2015                

 

                    Cardiovascular                   No orthopnea               

    2015      

         

 

                    Cardiovascular                   No palpitations            

       2015   

            

 

                    Ears/Nose/Throat/Neck                   No hearing loss     

              

2015                

 

                    Ears/Nose/Throat/Neck                   No nasal discharge  

                 

2015                

 

                    Ears/Nose/Throat/Neck                   No sore throat      

             

2015                

 

                    Ears/Nose/Throat/Neck                   dizziness           

        2015  

             

 

                    Cardiovascular                   hypertension               

    2015      

         

 

                    Musculoskeletal                   arthralgia(s)             

      2015    

           

 

                    Musculoskeletal                   low back pain             

      2015    

           

 

                    Musculoskeletal                   back pain                 

  2015        

       

 

                    Neurologic                   vertigo                                  



 

                    Psychiatric                   stress                                  



 

                    Endocrine                   hyperlipidemia                  

 2015         

      

 

                          Hematologic/Lymphatic                   No abnormal ec

chymoses                  

                                        2015                

 

                    Hematologic/Lymphatic                   No petechiae        

           

2015                

 

                          Hematologic/Lymphatic                   No abnormal bl

eeding and bruising       

                                        2015                

 

                    Hematologic/Lymphatic                   No anemia           

        2015  

             

 

                          Hematologic/Lymphatic                   No lymph node 

enlargement/mass          

                                        2015                

 

                    Constitutional                   fatigue                   0

2015           

    

 

                    Psychiatric                   stress                                  



 

                    Psychiatric                   depression                   0

2015           

    

 

                    Ears/Nose/Throat/Neck                   otalgia             

      2015    

           

 

                    Ears/Nose/Throat/Neck                   No sore throat      

             

2015                

 

                    Ears/Nose/Throat/Neck                   No sinus congestion 

                  

2015                

 

                    Constitutional                   No fever                   

2015          

     

 

                    Respiratory                   No cough                                

  

 

                    Neurologic                   pain, limb                               

   

 

                    Cardiovascular                   chest pain/pressure        

           

2015                

 

                    Musculoskeletal                   back pain                 

  2015        

       

 

                    Musculoskeletal                   muscle spasm              

     2015     

          

 

                    Neurologic                   dizziness                                

  

 

                    Cardiovascular                   hypertension               

    2015      

         

 

                    Dermatologic                   skin lesion                  

 2015         

      

 

                    Ears/Nose/Throat/Neck                   dizziness           

        2014  

             

 

                    Ears/Nose/Throat/Neck                   facial pain         

          2014

               

 

                    Ears/Nose/Throat/Neck                   sinusitis           

        2014  

             

 

                    Ears/Nose/Throat/Neck                   otalgia             

      2014    

           

 

                    Respiratory                   cough                   2014                

 

                    Constitutional                   No fever                   

2014          

     

 

                    Musculoskeletal                   back pain                 

  2014        

       

 

                    Respiratory                   cough                   2014                

 

                          Ears/Nose/Throat/Neck                   eustachian tub

e dysfunction             

                                        2014                

 

                    Ears/Nose/Throat/Neck                   sinus congestion    

               

2014                

 

                    Ears/Nose/Throat/Neck                   sinusitis           

        2014  

             

 

                    Ears/Nose/Throat/Neck                   headache            

       2014   

            

 

                    Ears/Nose/Throat/Neck                   sinusitis           

        2014  

             

 

                    Ears/Nose/Throat/Neck                   sore throat         

          2014

               

 

                    Respiratory                   cough                   2014                

 

                    Constitutional                   No fever                   

2014          

     

 

                    Constitutional                   No recent illness          

         2014 

              

 

                    Respiratory                   cough                   10/15/

2013                

 

                    Cardiovascular                   hypertension               

    10/15/2013      

         

 

                    Endocrine                   hyperlipidemia                  

 10/15/2013         

      

 

                    Endocrine                   obesity                   10/15/

2013                

 

                    Gastrointestinal                   dyspepsia                

   10/15/2013       

        

 

                    Constitutional                   No fever                   

10/15/2013          

     

 

                    Cardiovascular                   hypertension               

    2013      

         

 

                    Neurologic                   dizziness                                

  

 

                    Ears/Nose/Throat/Neck                   sinusitis           

        2013  

             

 

                    Ears/Nose/Throat/Neck                   sinus congestion    

               

2013                

 

                    Ears/Nose/Throat/Neck                   dizziness           

        2013  

             

 

                    Musculoskeletal                   back pain                 

  2013        

       

 

                    Ears/Nose/Throat/Neck                   No hearing loss     

              

2013                

 

                    Ears/Nose/Throat/Neck                   No nasal discharge  

                 

2013                

 

                    Ears/Nose/Throat/Neck                   No sinus congestion 

                  

2013                

 

                    Ears/Nose/Throat/Neck                   No sore throat      

             

2013                

 

                    Constitutional                   No chills                  

 05/10/2013         

      

 

                    Constitutional                   No anorexia                

   05/10/2013       

        

 

                    Constitutional                   recent illness             

      05/10/2013    

           

 

                    Constitutional                   No fever                   

05/10/2013          

     

 

                    Constitutional                   No fatigue                 

  05/10/2013        

       

 

                    Constitutional                   No malaise                 

  05/10/2013        

       

 

                    Constitutional                   No insomnia                

   05/10/2013       

        

 

                    Ears/Nose/Throat/Neck                   sore throat         

          05/10/2013

               

 

                    Ears/Nose/Throat/Neck                   otalgia             

      05/10/2013    

           

 

                    Ears/Nose/Throat/Neck                   sinus congestion    

               

05/10/2013                

 

                    Ears/Nose/Throat/Neck                   headache            

       05/10/2013   

            

 

                    Respiratory                   No cough                   05/

10/2013             

  

 

                    Genitourinary/Nephrology                   breast complaint 

                  

2013                

 

                    Gastrointestinal                   abdominal pain           

        2012  

             

 

                    Gastrointestinal                   dyspepsia                

   2012       

        

 

                    Gastrointestinal                   gastroesophageal reflux  

                 

2012                

 

                    Neurologic                   dizziness                                

  

 

                    Ears/Nose/Throat/Neck                   otalgia             

      2012    

           

 

                    Gastrointestinal                   No hemorrhoids           

        10/23/2012  

             

 

                    Gastrointestinal                   No hepatitis             

      10/23/2012    

           

 

                    Gastrointestinal                   abdominal pain           

        10/23/2012  

             

 

                    Gastrointestinal                   No constipation          

         10/23/2012 

              

 

                    Gastrointestinal                   No diarrhea              

     10/23/2012     

          

 

                    Gastrointestinal                   gastroesophageal reflux  

                 

10/23/2012                

 

                    Gastrointestinal                   No melena                

   10/23/2012       

        

 

                    Gastrointestinal                   No nausea                

   10/23/2012       

        

 

                    Gastrointestinal                   No vomiting              

     10/23/2012     

          

 

                    Gastrointestinal                   gas and bloating         

          10/23/2012

               

 

                    Neurologic                   dizziness                                

  

 

                    Cardiovascular                   hypertension               

    2012      

         

 

                    Endocrine                   hypoglycemia                   0

2012           

    

 

                    Constitutional                   fatigue                   0

2012           

    

 

                    Constitutional                   fatigue                   0

2012           

    

 

                    Neurologic                   dizziness                   2012             

  

 

                    Musculoskeletal                   muscle weakness           

        2012  

             

 

                    Cardiovascular                   hypertension               

    2012      

         

 

                    Endocrine                   diabetes mellitus type 2        

           

2012                

 

                    Musculoskeletal                   sciatica                  

 2012         

      

 

                    Neurologic                   pain, limb                               

   

 

                    Musculoskeletal                   back pain                 

  2012        

       

 

                    Musculoskeletal                   low back pain             

      2012    

           

 

                    Neurologic                   pain, limb                               

   

 

                    Musculoskeletal                   back pain                 

  2012        

       

 

                    Musculoskeletal                   joint complaint           

        2012  

             

 

                    Musculoskeletal                   back pain                 

  2012        

       

 

                    Musculoskeletal                   back pain                 

  2012        

       

 

                    Neurologic                   pain, limb                               

   

 

                    Neurologic                   pain, back                               

   

 

                    Constitutional                   fatigue                   0

2012           

    

 

                    Constitutional                   fatigue                   0

2012           

    

 

                    Cardiovascular                   No hypertension            

       2012   

            

 

                    Cardiovascular                   fatigue                   0

2012           

    

 

                    Cardiovascular                   No chest pain/pressure     

              

2012                

 

                    Respiratory                   No asthma                               

   

 

                    Respiratory                   No pleuritic pain             

      2012    

           

 

                    Respiratory                   No productive sputum          

         2012 

              

 

                    Respiratory                   No cough                                

  

 

                    Respiratory                   No dyspnea                   0

2012           

    

 

                    Respiratory                   No wheezing                   

2012          

     

 

                    Gastrointestinal                   No hemorrhoids           

        2012  

             

 

                    Gastrointestinal                   No hepatitis             

      2012    

           

 

                    Gastrointestinal                   No abdominal pain        

           

2012                

 

                    Gastrointestinal                   No constipation          

         2012 

              

 

                    Gastrointestinal                   No diarrhea              

     2012     

          

 

                    Gastrointestinal                   No gastroesophageal reflu

x                   

2012                

 

                    Gastrointestinal                   No melena                

   2012       

        

 

                    Gastrointestinal                   No nausea                

   2012       

        

 

                    Gastrointestinal                   No vomiting              

     2012     

          

 

                    Genitourinary/Nephrology                   No dysuria       

            

2012                

 

                    Genitourinary/Nephrology                   No nocturia      

             

2012                

 

                          Genitourinary/Nephrology                   No urinary 

incontinence              

                                        2012                

 

                    Musculoskeletal                   arthralgia(s)             

      2012    

           

 

                    Dermatologic                   No rash                                

  

 

                    Dermatologic                   No scar                                

  

 

                    Neurologic                   No dizziness                   

2012          

     

 

                    Neurologic                   No headache                   0

2012           

    

 

                    Neurologic                   No neck pain                   

2012          

     

 

                    Neurologic                   No syncope                               

   

 

                    Psychiatric                   No anxiety                   0

2012           

    

 

                    Psychiatric                   No depression                 

  2012        

       

 

                    Endocrine                   No goiter                                 

 

 

                    Endocrine                   No hyperglycemia                

   2012       

        

 

                    Endocrine                   No hypoglycemia                 

  2012        

       

 

                    Ears/Nose/Throat/Neck                   No hearing loss     

              

2011                

 

                    Ears/Nose/Throat/Neck                   No nasal discharge  

                 

2011                

 

                    Ears/Nose/Throat/Neck                   No sinus congestion 

                  

2011                

 

                    Ears/Nose/Throat/Neck                   sore throat         

          2011

               

 

                    Ears/Nose/Throat/Neck                   sinusitis           

        2011  

             

 

                    Ears/Nose/Throat/Neck                   otalgia             

      2011    

           

 

                    Neurologic                   headache                   2011              

 

 

                    Respiratory                   cough                   2011                

 

                    Musculoskeletal                   back pain                 

  2011        

       

 

                    Gastrointestinal                   abdominal pain           

        2011  

             

 

                    Neurologic                   dizziness                   2011             

  

 

                    Gastrointestinal                   No abdominal pain        

           

2011                

 

                    Gastrointestinal                   gastroesophageal reflux  

                 

2011                

 

                    Gastrointestinal                   No dyspepsia             

      2011    

           

 

                    Gastrointestinal                   No diarrhea              

     2011     

          

 

                    Gastrointestinal                   No constipation          

         2011 

              

 

                    Gastrointestinal                   No nausea                

   2011       

        

 

                    Musculoskeletal                   back pain                 

  2011        

       

 

                    Gastrointestinal                   abdominal pain           

        2011  

             

 

                    Neurologic                   dizziness                                

  

 

                    Ears/Nose/Throat/Neck                   otitis media        

           

2011                

 

                    Ears/Nose/Throat/Neck                   otalgia             

      2011    

           

 

                    Gastrointestinal                   gastroesophageal reflux  

                 

2011                

 

                    Musculoskeletal                   back pain                 

  2011        

       

 

                    Musculoskeletal                   low back pain             

      2011    

           

 

                    Musculoskeletal                   back pain                 

  2011        

       

 

                    Musculoskeletal                   low back pain             

      2011    

           

 

                    Constitutional                   fatigue                   0

2011           

    

 

                    Psychiatric                   stress                                  



 

                    Psychiatric                   depression                   0

2011           

    

 

                    Constitutional                   fever                                

  

 

                    Ears/Nose/Throat/Neck                   headache            

       2010   

            

 

                    Ears/Nose/Throat/Neck                   nasal discharge     

              

2010                

 

                    Ears/Nose/Throat/Neck                   sore throat         

          2010

               

 

                    Ears/Nose/Throat/Neck                   sinusitis           

        2010  

             

 

                    Ears/Nose/Throat/Neck                   sinus congestion    

               

2010                

 

                    Ears/Nose/Throat/Neck                   No tonsillitis      

             

2010                

 

                    Respiratory                   cough                   2010                

 

                    Respiratory                   nocturnal cough               

    2010      

         

 

                    Gastrointestinal                   No diarrhea              

     2010     

          

 

                    Gastrointestinal                   No constipation          

         2010 

              

 

                    Gastrointestinal                   nausea                   

2010          

     

 

                    Gastrointestinal                   No vomiting              

     2010     

          

 

                    Dermatologic                   No rash                                

  

 

                    Dermatologic                   No sores                               

   

 

                    Constitutional                   fatigue                   0

2010           

    

 

                    Gastrointestinal                   gastroesophageal reflux  

                 

2010                

 

                    Psychiatric                   anxiety                                 

 

 

                    Psychiatric                   stress                                  



 

                    Genitourinary/Nephrology                   urinary urgency  

                 

2010                

 

                    Constitutional                   fatigue                   0

2010           

    

 

                    Neurologic                   weakness                                 

 

 

                    Psychiatric                   anxiety                                 

 

 

                    Psychiatric                   stress                                  



 

                    Neurologic                   dyskinesia or tremor           

        2010  

             

 

                    Gastrointestinal                   constipation             

      2010    

           

 

                    Gastrointestinal                   gastroesophageal reflux  

                 

2010                

 

                    Gastrointestinal                   gas and bloating         

          2010

               

 

                    Constitutional                   fatigue                   0

2010           

    

 

                    Constitutional                   fatigue                   0

2010           

    

 

                    Psychiatric                   anxiety                   2010              

 

 

                    Psychiatric                   depression                   0

2010           

    

 

                    Musculoskeletal                   muscle weakness           

        2010  

             

 

                    Gastrointestinal                   abdominal pain           

        2010  

             

 

                    Cardiovascular                   hypertension               

    2010      

         



                                                                    



Physical Exam

                      



                    Exam Name                   System Name                   It

em Name             

                    Status                   Result                   Effective 

Dates          

                                        Notes                

 

                    Full Exam - General                   Constitutional        

            general 

appearance                   Overall:                   well nourished          

                          2019                   None                

 

                    Full Exam - General                   Constitutional        

            general 

appearance                   Overall:                   in no acute distress    

                          2019                   None                

 

                    Full Exam - General                   Ears/Nose/Throat      

             

otoscopic exam                   Left tympanic membrane:                   air-

fluid level                   2019                   None                

 

                    Full Exam - General                   Ears/Nose/Throat      

             

otoscopic exam                   Right tympanic membrane:                   air-

fluid level                   2019                   None                

 

                    Full Exam - General                   Ears/Nose/Throat      

             oral 

cavity/pharynx/larynx                    Oropharynx:                   a normal 

exam                      2019                   None                

 

                    Full Exam - General                   Respiratory           

        respiratory 

effort/rhythm                   Overall:                   no retractions       

                          2019                   None                

 

                    Full Exam - General                   Respiratory           

        respiratory 

effort/rhythm                   Overall:                   normal rate          

                          2019                   None                

 

                    Full Exam - General                   Neurologic            

       mental status

                    Overall:                   alert                   

9 

                                        None                

 

                    Full Exam - General                   Neurologic            

       mental status

                    Overall:                   oriented                   

2019                              None                

 

                    Full Exam - General                   Constitutional        

            general 

appearance                   Overall:                   well nourished          

                          06/10/2019                   None                

 

                    Full Exam - General                   Constitutional        

            general 

appearance                   Overall:                   well developed          

                          06/10/2019                   None                

 

                    Full Exam - General                   Constitutional        

            general 

appearance                   Overall:                   in no acute distress    

                          06/10/2019                   None                

 

                    Full Exam - General                   Neurologic            

       mental status

                    Overall:                   alert                   06/10/201

9 

                                        None                

 

                    Full Exam - General                   Neurologic            

       mental status

                    Overall:                   oriented                   

06/10/2019                              None                

 

                    Full Exam - General                   Psychiatric           

        mood and 

affect                    Overall:                   normal mood and affect     

 

                          06/10/2019                   None                

 

                    Full Exam - General                   Respiratory           

        auscultation

                          Overall:                   breath sounds clear bilater

ally    

                          06/10/2019                   None                

 

                    Full Exam - General                   Cardiovascular        

           

auscultation of heart                   Overall:                   regular rate 

                          06/10/2019                   None                

 

                    Full Exam - General                   Cardiovascular        

           

auscultation of heart                   Overall:                   normal heart 

sounds                    06/10/2019                   None                

 

                    Full Exam - General                   Cardiovascular        

           

auscultation of heart                   S4 (atrial gallop):                   

present                   06/10/2019                   None                

 

                    Full Exam - General                   Cardiovascular        

           

auscultation of heart                   Murmur:                   previously 

known murmur unchanged                   06/10/2019                   None      

         

 

                    Full Exam - General                   Cardiovascular        

           

extremities                   Overall:                   no clubbing            

                          06/10/2019                   None                

 

                    Full Exam - General                   Cardiovascular        

           

extremities                   Overall:                   No cyanosis            

                          06/10/2019                   None                

 

                    Full Exam - General                   Cardiovascular        

           

extremities                   Overall:                   No edema               

                          06/10/2019                   None                

 

                    Full Exam - General                   Constitutional        

            general 

appearance                   Overall:                   well nourished          

                          2019                   None                

 

                    Full Exam - General                   Constitutional        

            general 

appearance                   Overall:                   well developed          

                          2019                   None                

 

                    Full Exam - General                   Constitutional        

            general 

appearance                   Overall:                   in no acute distress    

                          2019                   None                

 

                    Full Exam - General                   Neurologic            

       mental status

                    Overall:                   alert                   

9 

                                        None                

 

                    Full Exam - General                   Neurologic            

       mental status

                    Overall:                   oriented                   

2019                              None                

 

                    Full Exam - General                   Psychiatric           

        mood and 

affect                    Overall:                   normal mood and affect     

 

                          2019                   None                

 

                    Full Exam - General                   Abdomen               

    abdominal exam  

                    Overall:                   no masses                   

2019                              None                

 

                    Full Exam - General                   Abdomen               

    abdominal exam  

                          Overall:                   normal bowel sounds        

          

                          2019                   None                

 

                    Full Exam - General                   Abdomen               

    abdominal exam  

                    Overall:                   soft                   2019

    

                                        None                

 

                    Full Exam - General                   Respiratory           

        auscultation

                          Overall:                   breath sounds clear bilater

ally    

                          2019                   None                

 

                    Full Exam - General                   Cardiovascular        

           

auscultation of heart                   Overall:                   regular rate 

                          2019                   None                

 

                    Full Exam - General                   Cardiovascular        

           

auscultation of heart                   Overall:                   normal heart 

sounds                    2019                   None                

 

                    Full Exam - General                   Cardiovascular        

           

extremities                   Overall:                   no clubbing            

                          2019                   None                

 

                    Full Exam - General                   Cardiovascular        

           

extremities                   Overall:                   No edema               

                          2019                   None                

 

                    Full Exam - General                   Cardiovascular        

           

extremities                   Overall:                   No cyanosis            

                          2019                   None                

 

                    Full Exam - General                   Constitutional        

            general 

appearance                   Overall:                   well nourished          

                          2018                   None                

 

                    Full Exam - General                   Constitutional        

            general 

appearance                   Overall:                   well developed          

                          2018                   None                

 

                    Full Exam - General                   Constitutional        

            general 

appearance                   Overall:                   in no acute distress    

                          2018                   None                

 

                    Full Exam - General                   Neurologic            

       mental status

                    Overall:                   alert                   

8 

                                        None                

 

                    Full Exam - General                   Neurologic            

       mental status

                    Overall:                   oriented                   

2018                              None                

 

                    Full Exam - General                   Psychiatric           

        mood and 

affect                    Overall:                   normal mood and affect     

 

                          2018                   None                

 

                    Full Exam - General                   Abdomen               

    abdominal exam  

                    Overall:                   no masses                   

2018                              None                

 

                    Full Exam - General                   Abdomen               

    abdominal exam  

                          Overall:                   normal bowel sounds        

          

                          2018                   None                

 

                    Full Exam - General                   Abdomen               

    abdominal exam  

                    Overall:                   soft                   2018

    

                                        None                

 

                    Full Exam - General                   Abdomen               

    abdominal exam  

                          Epigastric:                   tender to palpation     

          

                          2018                   None                

 

                    Full Exam - General                   Abdomen               

    abdominal exam  

                          Left upper quadrant:                   tender to palpa

tion      

                          2018                   None                

 

                    Full Exam - General                   Respiratory           

        auscultation

                          Overall:                   breath sounds clear bilater

ally    

                          2018                   None                

 

                    Full Exam - General                   Cardiovascular        

           

auscultation of heart                   Overall:                   regular rate 

                          2018                   None                

 

                    Full Exam - General                   Cardiovascular        

           

auscultation of heart                   Overall:                   normal heart 

sounds                    2018                   None                

 

                    Full Exam - General                   Cardiovascular        

           

auscultation of heart                   S4 (atrial gallop):                   

present                   2018                   None                

 

                    Full Exam - General                   Cardiovascular        

           

auscultation of heart                   Murmur:                   previously 

known murmur unchanged                   2018                   None      

         

 

                    Full Exam - General                   Constitutional        

            general 

appearance                   Overall:                   well nourished          

                          2018                   None                

 

                    Full Exam - General                   Constitutional        

            general 

appearance                   Overall:                   well developed          

                          2018                   None                

 

                    Full Exam - General                   Constitutional        

            general 

appearance                   Overall:                   in no acute distress    

                          2018                   None                

 

                    Full Exam - General                   Neurologic            

       mental status

                    Overall:                   alert                   

8 

                                        None                

 

                    Full Exam - General                   Neurologic            

       mental status

                    Overall:                   oriented                   

2018                              None                

 

                    Full Exam - General                   Psychiatric           

        mood and 

affect                    Overall:                   normal mood and affect     

 

                          2018                   None                

 

                    Full Exam - General                   Respiratory           

        auscultation

                          Overall:                   breath sounds clear bilater

ally    

                          2018                   None                

 

                    Full Exam - General                   Cardiovascular        

           

auscultation of heart                   Overall:                   normal heart 

sounds                    2018                   None                

 

                    Full Exam - General                   Cardiovascular        

           

auscultation of heart                   S4 (atrial gallop):                   

present                   2018                   None                

 

                    Full Exam - General                   Cardiovascular        

           

auscultation of heart                   Rate:                     bradycardia   

  

                          2018                   None                

 

                    Full Exam - General                   Cardiovascular        

           

extremities                   Overall:                   no clubbing            

                          2018                   None                

 

                    Full Exam - General                   Cardiovascular        

           

extremities                   Overall:                   No edema               

                          2018                   None                

 

                    Full Exam - General                   Cardiovascular        

           

extremities                   Overall:                   No cyanosis            

                          2018                   None                

 

                    Full Exam - General                   Neck                  

 inspection of neck 

                    Overall:                   normal size                   

2018                              None                

 

                    Full Exam - General                   Neck                  

 inspection of neck 

                    Overall:                   no masses                   

2018                              None                

 

                    Full Exam - General                   Abdomen               

    abdominal exam  

                    Overall:                   no masses                   

2018                              None                

 

                    Full Exam - General                   Abdomen               

    abdominal exam  

                          Overall:                   normal bowel sounds        

          

                          2018                   None                

 

                    Full Exam - General                   Abdomen               

    abdominal exam  

                    Overall:                   soft                   2018

    

                                        None                

 

                    Full Exam - General                   Abdomen               

    abdominal exam  

                          Epigastric:                   tender to palpation     

          

                          2018                   None                

 

                    Full Exam - General                   Constitutional        

            general 

appearance                   Overall:                   well nourished          

                          10/02/2018                   None                

 

                    Full Exam - General                   Constitutional        

            general 

appearance                   Overall:                   well developed          

                          10/02/2018                   None                

 

                    Full Exam - General                   Constitutional        

            general 

appearance                   Overall:                   in no acute distress    

                          10/02/2018                   None                

 

                    Full Exam - General                   Neurologic            

       mental status

                    Overall:                   alert                   10/02/201

8 

                                        None                

 

                    Full Exam - General                   Neurologic            

       mental status

                    Overall:                   oriented                   

10/02/2018                              None                

 

                    Full Exam - General                   Psychiatric           

        mood and 

affect                    Overall:                   normal mood and affect     

 

                          10/02/2018                   None                

 

                    Full Exam - General                   Abdomen               

    abdominal exam  

                    Overall:                   no masses                   

10/02/2018                              None                

 

                    Full Exam - General                   Abdomen               

    abdominal exam  

                          Overall:                   normal bowel sounds        

          

                          10/02/2018                   None                

 

                    Full Exam - General                   Abdomen               

    abdominal exam  

                    Overall:                   soft                   10/02/2018

    

                                        None                

 

                    Full Exam - General                   Abdomen               

    abdominal exam  

                          Epigastric:                   tender to palpation     

          

                          10/02/2018                   None                

 

                    Full Exam - General                   Respiratory           

        auscultation

                          Overall:                   breath sounds clear bilater

ally    

                          10/02/2018                   None                

 

                    Full Exam - General                   Cardiovascular        

           

auscultation of heart                   Overall:                   regular rate 

                          10/02/2018                   None                

 

                    Full Exam - General                   Cardiovascular        

           

auscultation of heart                   Overall:                   normal heart 

sounds                    10/02/2018                   None                

 

                    Full Exam - General                   Cardiovascular        

           

auscultation of heart                   Murmur:                   previously 

known murmur unchanged                   10/02/2018                   None      

         

 

                    Full Exam - General                   Cardiovascular        

           

auscultation of heart                   S4 (atrial gallop):                   

present                   10/02/2018                   None                

 

                    Full Exam - General                   Cardiovascular        

           

extremities                   Overall:                   no clubbing            

                          10/02/2018                   None                

 

                    Full Exam - General                   Cardiovascular        

           

extremities                   Overall:                   No cyanosis            

                          10/02/2018                   None                

 

                    Full Exam - General                   Cardiovascular        

           

extremities                   Edema present:                   non-pitting      

                          10/02/2018                   None                

 

                    Full Exam - General                   Constitutional        

            general 

appearance                   Overall:                   well nourished          

                          2018                   None                

 

                    Full Exam - General                   Constitutional        

            general 

appearance                   Overall:                   well developed          

                          2018                   None                

 

                    Full Exam - General                   Constitutional        

            general 

appearance                   Evidence of Distress:                   anxious    

                          2018                   None                

 

                    Full Exam - General                   Respiratory           

        auscultation

                          Overall:                   breath sounds clear bilater

ally    

                          2018                   None                

 

                    Full Exam - General                   Cardiovascular        

           

auscultation of heart                   Overall:                   regular rate 

                          2018                   None                

 

                    Full Exam - General                   Cardiovascular        

           

auscultation of heart                   Overall:                   normal heart 

sounds                    2018                   None                

 

                    Full Exam - General                   Cardiovascular        

           

auscultation of heart                   Murmur:                   previously 

known murmur unchanged                   2018                   None      

         

 

                    Full Exam - General                   Cardiovascular        

           

auscultation of heart                   S4 (atrial gallop):                   

present                   2018                   None                

 

                    Full Exam - General                   Cardiovascular        

           

extremities                   Overall:                   no clubbing            

                          2018                   None                

 

                    Full Exam - General                   Cardiovascular        

           

extremities                   Overall:                   No edema               

                          2018                   None                

 

                    Full Exam - General                   Cardiovascular        

           

extremities                   Overall:                   No cyanosis            

                          2018                   None                

 

                    Full Exam - General                   Neurologic            

       mental status

                    Overall:                   oriented                   

2018                              None                

 

                    Full Exam - General                   Neurologic            

       mental status

                    Overall:                   alert                   

8 

                                        None                

 

                    Full Exam - General                   Psychiatric           

        mood and 

affect                    Overall:                   normal mood and affect     

 

                          2018                   None                

 

                    Full Exam - General                   Abdomen               

    abdominal exam  

                    Overall:                   no masses                   

2018                              None                

 

                    Full Exam - General                   Abdomen               

    abdominal exam  

                          Overall:                   normal bowel sounds        

          

                          2018                   None                

 

                    Full Exam - General                   Abdomen               

    abdominal exam  

                    Overall:                   soft                   2018

    

                                        None                

 

                    Full Exam - General                   Abdomen               

    abdominal exam  

                          Epigastric:                   tender to palpation     

          

                          2018                   None                

 

                    Full Exam - General                   Constitutional        

            general 

appearance                   Evidence of Distress:                   tearful    

                          2018                   None                

 

                    Full Exam - General                   Constitutional        

            general 

appearance                   Overall:                   well nourished          

                          2018                   None                

 

                    Full Exam - General                   Constitutional        

            general 

appearance                   Overall:                   well developed          

                          2018                   None                

 

                    Full Exam - General                   Constitutional        

            general 

appearance                   Overall:                   in no acute distress    

                          2018                   None                

 

                    Full Exam - General                   Neurologic            

       mental status

                    Overall:                   alert                   

8 

                                        None                

 

                    Full Exam - General                   Neurologic            

       mental status

                    Overall:                   oriented                   

2018                              None                

 

                    Full Exam - General                   Respiratory           

        auscultation

                          Overall:                   breath sounds clear bilater

ally    

                          2018                   None                

 

                    Full Exam - General                   Cardiovascular        

           

auscultation of heart                   Overall:                   regular rate 

                          2018                   None                

 

                    Full Exam - General                   Cardiovascular        

           

auscultation of heart                   Overall:                   normal heart 

sounds                    2018                   None                

 

                    Full Exam - General                   Cardiovascular        

           

auscultation of heart                   S4 (atrial gallop):                   

present                   2018                   None                

 

                    Full Exam - General                   Cardiovascular        

           

auscultation of heart                   Murmur:                   previously 

known murmur unchanged                   2018                   None      

         

 

                    Full Exam - General                   Cardiovascular        

           

extremities                   Overall:                   no clubbing            

                          2018                   None                

 

                    Full Exam - General                   Cardiovascular        

           

extremities                   Overall:                   No edema               

                          2018                   None                

 

                    Full Exam - General                   Cardiovascular        

           

extremities                   Overall:                   No cyanosis            

                          2018                   None                

 

                    Full Exam - General                   Constitutional        

            general 

appearance                   Overall:                   well nourished          

                          2018                   None                

 

                    Full Exam - General                   Constitutional        

            general 

appearance                   Overall:                   in no acute distress    

                          2018                   None                

 

                    Full Exam - General                   Cardiovascular        

           

auscultation of heart                   Overall:                   regular rate 

                          2018                   None                

 

                    Full Exam - General                   Cardiovascular        

           

auscultation of heart                   Overall:                   no murmurs   

                          2018                   None                

 

                    Full Exam - General                   Respiratory           

        percussion  

                    Overall:                   benign percussion                

   

2018                              None                

 

                    Full Exam - General                   Respiratory           

        respiratory 

effort/rhythm                   Overall:                   no retractions       

                          2018                   None                

 

                    Full Exam - General                   Respiratory           

        respiratory 

effort/rhythm                   Overall:                   normal rate          

                          2018                   None                

 

                    Full Exam - General                   Respiratory           

        auscultation

                          Overall:                   breath sounds clear bilater

ally    

                          2018                   None                

 

                    Full Exam - General                   Ears/Nose/Throat      

             

otoscopic exam                   Overall:                   external auditory 

canals clear                   2018                   None                

 

                    Full Exam - General                   Ears/Nose/Throat      

             

otoscopic exam                   Overall:                   tympanic membranes 

clear                     2018                   None                

 

                    Full Exam - General                   Ears/Nose/Throat      

             oral 

cavity/pharynx/larynx                    Oropharynx:                   a normal 

exam                      2018                   None                

 

                    Full Exam - General                   Cardiovascular        

           

inspection of carotid pulses                   Overall:                   

strong, bilaterally equal, no bruits                   2018               

                                        None                

 

                    Full Exam - General                   Cardiovascular        

           palpation

of heart                   Overall:                   apical impulse location 

normal                    2018                   None                

 

                    Full Exam - General                   Cardiovascular        

           palpation

of heart                   Overall:                   no heave/lift             

                          2018                   None                

 

                    Full Exam - General                   Lymphatic             

      neck nodes    

                          Overall:                   anterior cervical chain aruna

ign         

                          2018                   None                

 

                    Full Exam - General                   Lymphatic             

      neck nodes    

                          Overall:                   posterior cervical chain be

nign        

                          2018                   None                

 

                    Full Exam - General                   Neurologic            

       deep tendon 

reflexes                   Overall:                   deep tendon reflexes 

intact                    2018                   None                

 

                    Full Exam - General                   Neurologic            

       mental status

                    Overall:                   alert                   

8 

                                        None                

 

                    Full Exam - General                   Neurologic            

       mental status

                    Overall:                   oriented                   

2018                              None                

 

                    Full Exam - General                   Neurologic            

       cranial 

nerves                    Overall:                   cranial nerves 1-12 intact 

 

                          2018                   None                

 

                    Full Exam - General                   Psychiatric           

        

orientation/consciousness                   Overall:                   oriented 

to person, place and time                   2018                   None   

            

 

                    Full Exam - General                   Psychiatric           

        

behavior/psychomotor activity                   Overall:                   no 

tics, normal psychomotor activity                   2018                  

                                        None                

 

                    Full Exam - General                   Psychiatric           

        mood and 

affect                    Overall:                   normal mood and affect     

 

                          2018                   None                

 

                    Full Exam - General                   Psychiatric           

        appearance  

                          Overall:                   well-groomed, good eye cont

act       

                          2018                   None                

 

                    Full Exam - General                   Psychiatric           

        speech      

                          Overall:                   normal quality, no aphasia 

              

                          2018                   None                

 

                    Full Exam - General                   Psychiatric           

        speech      

                          Overall:                   normal quality, quantity, r

ate           

                          2018                   None                

 

                    Full Exam - General                   Psychiatric           

        attention   

                          Overall:                   normal digit recall and num

debo 

repeating                   2018                   None                

 

                    Full Exam - General                   Constitutional        

            general 

appearance                   Overall:                   well nourished          

                          2018                   None                

 

                    Full Exam - General                   Constitutional        

            general 

appearance                   Overall:                   well developed          

                          2018                   None                

 

                    Full Exam - General                   Constitutional        

            general 

appearance                   Overall:                   in no acute distress    

                          2018                   None                

 

                    Full Exam - General                   Musculoskeletal       

            head and

neck                      Cervical Spine:                   a normal exam       

   

                          2018                   None                

 

                    Full Exam - General                   Musculoskeletal       

            head and

neck                   Head:                   atraumatic                   

2018                              None                

 

                    Full Exam - General                   Musculoskeletal       

            head and

neck                   Head:                   normocephalic                   

2018                              None                

 

                    Full Exam - General                   Musculoskeletal       

            head and

neck                      Overall:                   head atraumatic            

   

                          2018                   None                

 

                    Full Exam - General                   Musculoskeletal       

            head and

neck                      Overall:                   cervical spine benign      

   

                          2018                   None                

 

                    Full Exam - General                   Neurologic            

       mental status

                    Overall:                   alert                   

8 

                                        None                

 

                    Full Exam - General                   Neurologic            

       mental status

                    Overall:                   oriented                   

2018                              None                

 

                    Full Exam - General                   Psychiatric           

        mood and 

affect                    Overall:                   normal mood and affect     

 

                          2018                   None                

 

                    Full Exam - General                   Constitutional        

            general 

appearance                   Overall:                   well nourished          

                          2018                   None                

 

                    Full Exam - General                   Constitutional        

            general 

appearance                   Overall:                   well developed          

                          2018                   None                

 

                    Full Exam - General                   Constitutional        

            general 

appearance                   Overall:                   in no acute distress    

                          2018                   None                

 

                    Full Exam - General                   Neurologic            

       mental status

                    Overall:                   alert                   

8 

                                        None                

 

                    Full Exam - General                   Neurologic            

       mental status

                    Overall:                   oriented                   

2018                              None                

 

                    Full Exam - General                   Psychiatric           

        mood and 

affect                    Overall:                   normal mood and affect     

 

                          2018                   None                

 

                    Full Exam - General                   Respiratory           

        auscultation

                          Overall:                   breath sounds clear bilater

ally    

                          2018                   None                

 

                    Full Exam - General                   Cardiovascular        

           

auscultation of heart                   Overall:                   regular rate 

                          2018                   None                

 

                    Full Exam - General                   Cardiovascular        

           

auscultation of heart                   Overall:                   normal heart 

sounds                    2018                   None                

 

                    Full Exam - General                   Cardiovascular        

           

auscultation of heart                   S4 (atrial gallop):                   

present                   2018                   None                

 

                    Full Exam - General                   Cardiovascular        

           

auscultation of heart                   Murmur:                   previously 

known murmur unchanged                   2018                   None      

         

 

                    Full Exam - General                   Musculoskeletal       

            spine, 

ribs and pelvis                   Spine:                    tender @ cervical 

spine                     2018                   None                

 

                    Full Exam - General                   Constitutional        

            general 

appearance                   Overall:                   well nourished          

                          2018                   None                

 

                    Full Exam - General                   Constitutional        

            general 

appearance                   Overall:                   in no acute distress    

                          2018                   None                

 

                    Full Exam - General                   Cardiovascular        

           

auscultation of heart                   Overall:                   regular rate 

                          2018                   None                

 

                    Full Exam - General                   Respiratory           

        percussion  

                    Overall:                   benign percussion                

   

2018                              None                

 

                    Full Exam - General                   Respiratory           

        respiratory 

effort/rhythm                   Overall:                   no retractions       

                          2018                   None                

 

                    Full Exam - General                   Respiratory           

        respiratory 

effort/rhythm                   Overall:                   normal rate          

                          2018                   None                

 

                    Full Exam - General                   Lymphatic             

      other nodes   

                          Right auricular chain:                   tender       

           

                          2018                   None                

 

                    Full Exam - General                   Lymphatic             

      other nodes   

                    Left supraclavicular:                   tender              

     

2018                              None                

 

                    Full Exam - General                   Ears/Nose/Throat      

             

otoscopic exam                   Left tympanic membrane:                   air-

fluid level                   2018                   None                

 

                    Full Exam - General                   Ears/Nose/Throat      

             

otoscopic exam                   Right tympanic membrane:                   air-

fluid level                   2018                   None                

 

                    Full Exam - General                   Neurologic            

       mental status

                    Overall:                   alert                   

8 

                                        None                

 

                    Full Exam - General                   Neurologic            

       mental status

                    Overall:                   oriented                   

2018                              None                

 

                    Full Exam - General                   Constitutional        

            general 

appearance                   Overall:                   well nourished          

                          2018                   None                

 

                    Full Exam - General                   Constitutional        

            general 

appearance                   Overall:                   well developed          

                          2018                   None                

 

                    Full Exam - General                   Constitutional        

            general 

appearance                   Overall:                   in no acute distress    

                          2018                   None                

 

                    Full Exam - General                   Neurologic            

       mental status

                    Overall:                   alert                   

8 

                                        None                

 

                    Full Exam - General                   Neurologic            

       mental status

                    Overall:                   oriented                   

2018                              None                

 

                    Full Exam - General                   Psychiatric           

        mood and 

affect                    Overall:                   normal mood and affect     

 

                          2018                   None                

 

                    Full Exam - General                   Musculoskeletal       

            spine, 

ribs and pelvis                   Spine:                    tender @ lumbar spin

e

                          2018                   None                

 

                    Full Exam - General                   Abdomen               

    abdominal exam  

                    Overall:                   no masses                   

2018                              None                

 

                    Full Exam - General                   Abdomen               

    abdominal exam  

                          Overall:                   normal bowel sounds        

          

                          2018                   None                

 

                    Full Exam - General                   Abdomen               

    abdominal exam  

                    Overall:                   soft                   2018

    

                                        None                

 

                    Full Exam - General                   Abdomen               

    abdominal exam  

                          Left lower quadrant:                   tender to palpa

tion      

                          2018                   None                

 

                    Full Exam - General                   Constitutional        

            general 

appearance                   Overall:                   well nourished          

                          2017                   None                

 

                    Full Exam - General                   Constitutional        

            general 

appearance                   Overall:                   well developed          

                          2017                   None                

 

                    Full Exam - General                   Constitutional        

            general 

appearance                   Overall:                   in no acute distress    

                          2017                   None                

 

                    Full Exam - General                   Neurologic            

       mental status

                    Overall:                   alert                   

7 

                                        None                

 

                    Full Exam - General                   Neurologic            

       mental status

                    Overall:                   oriented                   

2017                              None                

 

                    Full Exam - General                   Psychiatric           

        mood and 

affect                    Overall:                   normal mood and affect     

 

                          2017                   None                

 

                    Full Exam - General                   Musculoskeletal       

            gait and

station                   Station:                   kyphosis                   

2017                              None                

 

                    Full Exam - General                   Constitutional        

            general 

appearance                   Overall:                   well nourished          

                          2017                   None                

 

                    Full Exam - General                   Constitutional        

            general 

appearance                   Overall:                   well developed          

                          2017                   None                

 

                    Full Exam - General                   Constitutional        

            general 

appearance                   Overall:                   in no acute distress    

                          2017                   None                

 

                    Full Exam - General                   Constitutional        

            general 

appearance                   Assistive Device:                   cane           

                          2017                   None                

 

                    Full Exam - General                   Neurologic            

       mental status

                    Overall:                   alert                   

7 

                                        None                

 

                    Full Exam - General                   Neurologic            

       mental status

                    Overall:                   oriented                   

2017                              None                

 

                    Full Exam - General                   Psychiatric           

        mood and 

affect                    Overall:                   normal mood and affect     

 

                          2017                   None                

 

                    Full Exam - General                   Abdomen               

    abdominal exam  

                    Overall:                   no masses                   

2017                              None                

 

                    Full Exam - General                   Abdomen               

    abdominal exam  

                          Overall:                   normal bowel sounds        

          

                          2017                   None                

 

                    Full Exam - General                   Abdomen               

    abdominal exam  

                    Overall:                   soft                   2017

    

                                        None                

 

                    Full Exam - General                   Abdomen               

    abdominal exam  

                          Left lower quadrant:                   tender to palpa

tion      

                          2017                   None                

 

                    Full Exam - General                   Abdomen               

    abdominal exam  

                          Right lower quadrant:                   tender to palp

ation     

                          2017                   None                

 

                    Full Exam - General                   Musculoskeletal       

            spine, 

ribs and pelvis                   Spine:                    tender @ lumbar spin

e

                          2017                   left                 

 

                    Full Exam - General                   Respiratory           

        auscultation

                          Overall:                   breath sounds clear bilater

ally    

                          2017                   None                

 

                    Full Exam - General                   Cardiovascular        

           

auscultation of heart                   Overall:                   regular rate 

                          2017                   None                

 

                    Full Exam - General                   Cardiovascular        

           

auscultation of heart                   Overall:                   normal heart 

sounds                    2017                   None                

 

                    Full Exam - General                   Cardiovascular        

           

auscultation of heart                   S4 (atrial gallop):                   

present                   2017                   None                

 

                    Full Exam - General                   Constitutional        

            general 

appearance                   Overall:                   well nourished          

                          2017                   None                

 

                    Full Exam - General                   Constitutional        

            general 

appearance                   Overall:                   well developed          

                          2017                   None                

 

                    Full Exam - General                   Constitutional        

            general 

appearance                   Overall:                   in no acute distress    

                          2017                   None                

 

                    Full Exam - General                   Neurologic            

       mental status

                    Overall:                   alert                   

7 

                                        None                

 

                    Full Exam - General                   Neurologic            

       mental status

                    Overall:                   oriented                   

2017                              None                

 

                    Full Exam - General                   Psychiatric           

        mood and 

affect                    Overall:                   normal mood and affect     

 

                          2017                   None                

 

                    Full Exam - General                   Respiratory           

        auscultation

                          Overall:                   breath sounds clear bilater

ally    

                          2017                   None                

 

                    Full Exam - General                   Cardiovascular        

           

auscultation of heart                   Overall:                   regular rate 

                          2017                   None                

 

                    Full Exam - General                   Cardiovascular        

           

auscultation of heart                   Overall:                   normal heart 

sounds                    2017                   None                

 

                    Full Exam - General                   Cardiovascular        

           

auscultation of heart                   S4 (atrial gallop):                   

present                   2017                   None                

 

                    Full Exam - General                   Cardiovascular        

           

auscultation of heart                   Murmur:                   previously 

known murmur unchanged                   2017                   None      

         

 

                    Full Exam - General                   Cardiovascular        

           

extremities                   Overall:                   no clubbing            

                          2017                   None                

 

                    Full Exam - General                   Cardiovascular        

           

extremities                   Overall:                   No cyanosis            

                          2017                   None                

 

                    Full Exam - General                   Cardiovascular        

           

extremities                   Edema present:                   non-pitting      

                          2017                   None                

 

                    Full Exam - General                   Neck                  

 inspection of neck 

                    Overall:                   normal size                   

2017                              None                

 

                    Full Exam - General                   Neck                  

 inspection of neck 

                    Overall:                   no masses                   

2017                              None                

 

                    Full Exam - General                   Chest/Breast          

         breast and 

axillae palpation                   Overall:                   no masses        

                          2017                   None                

 

                    Full Exam - General                   Chest/Breast          

         breast and 

axillae palpation                   Overall:                   axillae non-

tender                    2017                   None                

 

                    Full Exam - General                   Chest/Breast          

         breast and 

axillae palpation                   Overall:                   no nipple 

discharge                   2017                   None                

 

                    Full Exam - General                   Chest/Breast          

         breast and 

axillae palpation                       Left upper inner quadrant:              

    

                    tender                   2017                   None  

              

 

                    Full Exam - General                   Chest/Breast          

         breast and 

axillae palpation                       Left upper outer quadrant:              

    

                    tender                   2017                   None  

              

 

                    Full Exam - General                   Chest/Breast          

         breast and 

axillae palpation                       Left lower inner quadrant:              

    

                    tender                   2017                   None  

              

 

                    Full Exam - General                   Chest/Breast          

         breast and 

axillae palpation                       Left lower outer quadrant:              

    

                    tender                   2017                   None  

              

 

                    Full Exam - General                   Chest/Breast          

         breast and 

axillae palpation                       Right upper inner quadrant:             

    

                    tender                   2017                   None  

              

 

                    Full Exam - General                   Chest/Breast          

         breast and 

axillae palpation                       Right upper outer quadrant:             

    

                    tender                   2017                   None  

              

 

                    Full Exam - General                   Chest/Breast          

         breast and 

axillae palpation                       Right lower inner quadrant:             

    

                    tender                   2017                   None  

              

 

                    Full Exam - General                   Chest/Breast          

         breast and 

axillae palpation                       Right lower outer quadrant:             

    

                    tender                   2017                   None  

              

 

                    Full Exam - General                   Musculoskeletal       

            gait and

station                   Station:                   kyphosis                   

2017                              None                

 

                    Full Exam - General                   Musculoskeletal       

            spine, 

ribs and pelvis                   Chest wall palpation:                   chest 

wall pain                   2017                   bilateral costochondral

junction                

 

                    Full Exam - General                   Constitutional        

            general 

appearance                   Overall:                   well nourished          

                          2017                   None                

 

                    Full Exam - General                   Constitutional        

            general 

appearance                   Overall:                   well developed          

                          2017                   None                

 

                    Full Exam - General                   Constitutional        

            general 

appearance                   Overall:                   in no acute distress    

                          2017                   None                

 

                    Full Exam - General                   Constitutional        

            general 

appearance                   Nourishment:                   well nourished      

                          2017                   None                

 

                    Full Exam - General                   Constitutional        

            general 

appearance                   Evidence of Distress:                   in no acute

distress                   2017                   None                

 

                    Full Exam - General                   Eyes                  

 conjunctiva/eyelids

                          Overall:                   conjunctiva clear          

        

                          2017                   None                

 

                    Full Exam - General                   Eyes                  

 conjunctiva/eyelids

                    Overall:                   cornea clear                   

2017                              None                

 

                    Full Exam - General                   Eyes                  

 conjunctiva/eyelids

                    Overall:                   eyelids normal                   

2017                              None                

 

                    Full Exam - General                   Eyes                  

 pupils and irises  

                          Overall:                   pupils equal, round, reacti

ve to 

light and accomodation                   2017                   None      

         

 

                    Full Exam - General                   Ears/Nose/Throat      

             

external ear                   Overall:                   normal appearance     

                          2017                   None                

 

                    Full Exam - General                   Ears/Nose/Throat      

             

external nose                   Overall:                   benign appearance    

                          2017                   None                

 

                    Full Exam - General                   Ears/Nose/Throat      

             

otoscopic exam                   Overall:                   external auditory 

canals clear                   2017                   None                

 

                    Full Exam - General                   Ears/Nose/Throat      

             

otoscopic exam                   Left tympanic membrane:                   air-

fluid level                   2017                   None                

 

                    Full Exam - General                   Ears/Nose/Throat      

             

otoscopic exam                   Right tympanic membrane:                   air-

fluid level                   2017                   None                

 

                    Full Exam - General                   Ears/Nose/Throat      

             

internal nose                   Left nasal cavity:                   mucosal 

edema                     2017                   None                

 

                    Full Exam - General                   Ears/Nose/Throat      

             

internal nose                   Right nasal cavity:                   mucosal 

edema                     2017                   None                

 

                    Full Exam - General                   Ears/Nose/Throat      

             

lips/teeth/gingiva                   Overall:                   benign lips     

                          2017                   None                

 

                    Full Exam - General                   Ears/Nose/Throat      

             

lips/teeth/gingiva                   Overall:                   normal dentition

                          2017                   None                

 

                    Full Exam - General                   Ears/Nose/Throat      

             oral 

cavity/pharynx/larynx                    Oropharynx:                   a normal 

exam                      2017                   None                

 

                    Full Exam - General                   Respiratory           

        auscultation

                          Overall:                   breath sounds clear bilater

ally    

                          2017                   None                

 

                    Full Exam - General                   Cardiovascular        

           

auscultation of heart                   Overall:                   regular rate 

                          2017                   None                

 

                    Full Exam - General                   Cardiovascular        

           

auscultation of heart                   Overall:                   normal heart 

sounds                    2017                   None                

 

                    Full Exam - General                   Cardiovascular        

           

auscultation of heart                   Overall:                   no murmurs   

                          2017                   None                

 

                    Full Exam - General                   Integument            

       inspection of

skin                      Overall:                   no rash, lesions           

   

                          2017                   None                

 

                    Full Exam - General                   Psychiatric           

        mood and 

affect                    Overall:                   normal mood and affect     

 

                          2017                   None                

 

                    Full Exam - General                   Ears/Nose/Throat      

             

otoscopic exam                   Otorrhea:                   absent             

                          2017                   None                

 

                    Full Exam - General                   Ears/Nose/Throat      

             

otoscopic exam                   Perforation:                   absent          

                          2017                   None                

 

                    Full Exam - General                   Ears/Nose/Throat      

             

internal nose                   Overall:                   no sinus tenderness  

                          2017                   None                

 

                    Full Exam - General                   Ears/Nose/Throat      

             

internal nose                   Overall:                   no drainage          

                          2017                   None                

 

                    Full Exam - General                   Ears/Nose/Throat      

             oral 

cavity/pharynx/larynx                    Overall:                   oral mucosa 

clear                     2017                   None                

 

                    Full Exam - General                   Respiratory           

        respiratory 

effort/rhythm                   Overall:                   no retractions       

                          2017                   None                

 

                    Full Exam - General                   Respiratory           

        respiratory 

effort/rhythm                   Overall:                   normal rate          

                          2017                   None                

 

                    Full Exam - General                   Lymphatic             

      neck nodes    

                          Overall:                   shotty lymphadenopathy     

            

                          2017                   tender but no enlargement

 palpable                

 

                    Full Exam - General                   Constitutional        

            general 

appearance                   Overall:                   well nourished          

                          2016                   None                

 

                    Full Exam - General                   Constitutional        

            general 

appearance                   Overall:                   well developed          

                          2016                   None                

 

                    Full Exam - General                   Constitutional        

            general 

appearance                   Overall:                   in no acute distress    

                          2016                   None                

 

                    Full Exam - General                   Ears/Nose/Throat      

             

otoscopic exam                   Overall:                   external auditory 

canals clear                   2016                   None                

 

                    Full Exam - General                   Ears/Nose/Throat      

             

internal nose                   Drainage:                   clear               

                          2016                   None                

 

                    Full Exam - General                   Ears/Nose/Throat      

             oral 

cavity/pharynx/larynx                    Oropharynx:                   postnasal

drainage                   2016                   None                

 

                    Full Exam - General                   Respiratory           

        auscultation

                          Overall:                   breath sounds clear bilater

ally    

                          2016                   None                

 

                    Full Exam - General                   Cardiovascular        

           

auscultation of heart                   Overall:                   regular rate 

                          2016                   None                

 

                    Full Exam - General                   Cardiovascular        

           

auscultation of heart                   Overall:                   normal heart 

sounds                    2016                   None                

 

                    Full Exam - General                   Cardiovascular        

           

auscultation of heart                   Overall:                   no murmurs   

                          2016                   None                

 

                    Full Exam - General                   Neurologic            

       mental status

                    Overall:                   alert                   

6 

                                        None                

 

                    Full Exam - General                   Neurologic            

       mental status

                    Overall:                   oriented                   

2016                              None                

 

                    Full Exam - General                   Psychiatric           

        mood and 

affect                    Overall:                   normal mood and affect     

 

                          2016                   None                

 

                    Full Exam - General                   Constitutional        

            general 

appearance                   Overall:                   well nourished          

                          2016                   None                

 

                    Full Exam - General                   Constitutional        

            general 

appearance                   Overall:                   well developed          

                          2016                   None                

 

                    Full Exam - General                   Constitutional        

            general 

appearance                   Overall:                   in no acute distress    

                          2016                   None                

 

                    Full Exam - General                   Neurologic            

       mental status

                    Overall:                   alert                   

6 

                                        None                

 

                    Full Exam - General                   Neurologic            

       mental status

                    Overall:                   oriented                   

2016                              None                

 

                    Full Exam - General                   Psychiatric           

        mood and 

affect                    Overall:                   normal mood and affect     

 

                          2016                   None                

 

                    Full Exam - General                   Abdomen               

    abdominal exam  

                    Overall:                   no masses                   

2016                              None                

 

                    Full Exam - General                   Abdomen               

    abdominal exam  

                          Overall:                   normal bowel sounds        

          

                          2016                   None                

 

                    Full Exam - General                   Abdomen               

    abdominal exam  

                    Overall:                   soft                   2016

    

                                        None                

 

                    Full Exam - General                   Abdomen               

    abdominal exam  

                    Overall:                   no tenderness                   

2016                              None                

 

                    Full Exam - General                   Constitutional        

            general 

appearance                   Overall:                   well nourished          

                          2016                   None                

 

                    Full Exam - General                   Constitutional        

            general 

appearance                   Overall:                   well developed          

                          2016                   None                

 

                    Full Exam - General                   Constitutional        

            general 

appearance                   Overall:                   in no acute distress    

                          2016                   None                

 

                    Full Exam - General                   Neurologic            

       mental status

                    Overall:                   alert                   

6 

                                        None                

 

                    Full Exam - General                   Neurologic            

       mental status

                    Overall:                   oriented                   

2016                              None                

 

                    Full Exam - General                   Psychiatric           

        mood and 

affect                    Overall:                   normal mood and affect     

 

                          2016                   None                

 

                    Full Exam - General                   Respiratory           

        auscultation

                          Overall:                   breath sounds clear bilater

ally    

                          2016                   None                

 

                    Full Exam - General                   Cardiovascular        

           

auscultation of heart                   Overall:                   regular rate 

                          2016                   None                

 

                    Full Exam - General                   Cardiovascular        

           

auscultation of heart                   Overall:                   normal heart 

sounds                    2016                   None                

 

                    Full Exam - General                   Cardiovascular        

           

auscultation of heart                   S3 (ventricular gallop):                

                    present                   2016                   None 

               

 

                    Full Exam - General                   Cardiovascular        

           

auscultation of heart                   Murmur:                   previously 

known murmur unchanged                   2016                   None      

         

 

                    Full Exam - General                   Cardiovascular        

           

extremities                   Overall:                   no clubbing            

                          2016                   None                

 

                    Full Exam - General                   Cardiovascular        

           

extremities                   Overall:                   No cyanosis            

                          2016                   None                

 

                    Full Exam - General                   Cardiovascular        

           

extremities                   Overall:                   No edema               

                          2016                   None                

 

                    Full Exam - General                   Constitutional        

            general 

appearance                   Overall:                   well nourished          

                          2015                   None                

 

                    Full Exam - General                   Constitutional        

            general 

appearance                   Overall:                   well developed          

                          2015                   None                

 

                    Full Exam - General                   Constitutional        

            general 

appearance                   Overall:                   in no acute distress    

                          2015                   None                

 

                    Full Exam - General                   Ears/Nose/Throat      

             

otoscopic exam                   Overall:                   external auditory 

canals clear                   2015                   None                

 

                    Full Exam - General                   Ears/Nose/Throat      

             

internal nose                   Drainage:                   clear               

                          2015                   None                

 

                    Full Exam - General                   Ears/Nose/Throat      

             oral 

cavity/pharynx/larynx                    Oropharynx:                   postnasal

drainage                   2015                   None                

 

                    Full Exam - General                   Respiratory           

        auscultation

                          Overall:                   breath sounds clear bilater

ally    

                          2015                   None                

 

                    Full Exam - General                   Cardiovascular        

           

auscultation of heart                   Overall:                   regular rate 

                          2015                   None                

 

                    Full Exam - General                   Cardiovascular        

           

auscultation of heart                   Overall:                   normal heart 

sounds                    2015                   None                

 

                    Full Exam - General                   Cardiovascular        

           

auscultation of heart                   Overall:                   no murmurs   

                          2015                   None                

 

                    Full Exam - General                   Neurologic            

       mental status

                    Overall:                   alert                   

5 

                                        None                

 

                    Full Exam - General                   Neurologic            

       mental status

                    Overall:                   oriented                   

2015                              None                

 

                    Full Exam - General                   Psychiatric           

        mood and 

affect                    Overall:                   normal mood and affect     

 

                          2015                   None                

 

                    Full Exam - General                   Abdomen               

    abdominal exam  

                    Overall:                   no masses                   

2015                              None                

 

                    Full Exam - General                   Abdomen               

    abdominal exam  

                    Overall:                   no tenderness                   

2015                              None                

 

                    Full Exam - General                   Abdomen               

    abdominal exam  

                          Overall:                   normal bowel sounds        

          

                          2015                   None                

 

                    Full Exam - General                   Abdomen               

    abdominal exam  

                    Overall:                   soft                   2015

    

                                        None                

 

                    Full Exam - General                   Musculoskeletal       

            gait and

station                   Overall:                   normal gait                

                          2015                   None                

 

                    Full Exam - General                   Musculoskeletal       

            gait and

station                   Station:                   kyphosis                   

2015                              None                

 

                    Full Exam - General                   Musculoskeletal       

            spine, 

ribs and pelvis                   Spine:                    tender @ thoracic 

spine                     2015                   None                

 

                    Full Exam - General                   Musculoskeletal       

            spine, 

ribs and pelvis                   Spine:                    tender @ lumbar spin

e

                          2015                   None                

 

                    Full Exam - General                   Ears/Nose/Throat      

             

otoscopic exam                   Left tympanic membrane:                   a 

normal exam                   2015                   None                

 

                    Full Exam - General                   Ears/Nose/Throat      

             

otoscopic exam                   Right tympanic membrane:                   a 

normal exam                   2015                   None                

 

                    Full Exam - General                   Constitutional        

            general 

appearance                   Overall:                   well nourished          

                          2015                   None                

 

                    Full Exam - General                   Constitutional        

            general 

appearance                   Overall:                   well developed          

                          2015                   None                

 

                    Full Exam - General                   Constitutional        

            general 

appearance                   Evidence of Distress:                   tearful    

                          2015                   None                

 

                    Full Exam - General                   Neurologic            

       mental status

                    Overall:                   alert                   

5 

                                        None                

 

                    Full Exam - General                   Neurologic            

       mental status

                    Overall:                   oriented                   

2015                              None                

 

                    Full Exam - General                   Psychiatric           

        mood and 

affect                   Mood:                   depressed                   

2015                              tearful                

 

                    Full Exam - General                   Respiratory           

        auscultation

                          Overall:                   breath sounds clear bilater

ally    

                          2015                   None                

 

                    Full Exam - General                   Cardiovascular        

           

auscultation of heart                   Overall:                   regular rate 

                          2015                   None                

 

                    Full Exam - General                   Cardiovascular        

           

auscultation of heart                   Overall:                   normal heart 

sounds                    2015                   None                

 

                    Full Exam - General                   Cardiovascular        

           

auscultation of heart                   S3 (ventricular gallop):                

                    present                   2015                   None 

               

 

                    Full Exam - General                   Cardiovascular        

           

auscultation of heart                   Murmur:                   previously 

known murmur unchanged                   2015                   None      

         

 

                    Full Exam - General                   Cardiovascular        

           

extremities                   Overall:                   no clubbing            

                          2015                   None                

 

                    Full Exam - General                   Cardiovascular        

           

extremities                   Overall:                   No edema               

                          2015                   None                

 

                    Full Exam - General                   Cardiovascular        

           

extremities                   Overall:                   No cyanosis            

                          2015                   None                

 

                    Full Exam - General                   Constitutional        

            general 

appearance                   Overall:                   well nourished          

                          2015                   None                

 

                    Full Exam - General                   Constitutional        

            general 

appearance                   Overall:                   well developed          

                          2015                   None                

 

                    Full Exam - General                   Constitutional        

            general 

appearance                   Overall:                   in no acute distress    

                          2015                   None                

 

                    Full Exam - General                   Ears/Nose/Throat      

             oral 

cavity/pharynx/larynx                    Overall:                   oral mucosa 

clear                     2015                   None                

 

                    Full Exam - General                   Ears/Nose/Throat      

             

otoscopic exam                          Left external auditory canal:           

       

                    complete cerumen impaction                   2015     

              None  

             

 

                    Full Exam - General                   Ears/Nose/Throat      

             

otoscopic exam                          Right external auditory canal:          

       

                    complete cerumen impaction                   2015     

              None 

              

 

                    Full Exam - General                   Ears/Nose/Throat      

             

otoscopic exam                   Right tympanic membrane:                   

abnormal light reflex                   2015                   None       

        

 

                    Full Exam - General                   Ears/Nose/Throat      

             

otoscopic exam                   Right tympanic membrane:                   air-

fluid level                   2015                   secondary to cerumen 

removal                 

 

                    Full Exam - General                   Respiratory           

        auscultation

                          Overall:                   breath sounds clear bilater

ally    

                          2015                   None                

 

                    Full Exam - General                   Neurologic            

       mental status

                    Overall:                   alert                   

5 

                                        None                

 

                    Full Exam - General                   Neurologic            

       mental status

                    Overall:                   oriented                   

2015                              None                

 

                    Full Exam - General                   Psychiatric           

        mood and 

affect                    Overall:                   normal mood and affect     

 

                          2015                   None                

 

                    Full Exam - General                   Ears/Nose/Throat      

             

otoscopic exam                   Left tympanic membrane:                   

abnormal light reflex                   2015                   None       

        

 

                    Full Exam - General                   Cardiovascular        

           

auscultation of heart                   S3 (ventricular gallop):                

                    present                   2015                   None 

               

 

                    Full Exam - General                   Cardiovascular        

           

auscultation of heart                   Overall:                   regular rate 

                          2015                   None                

 

                    Full Exam - General                   Constitutional        

            general 

appearance                   Overall:                   well nourished          

                          2015                   None                

 

                    Full Exam - General                   Constitutional        

            general 

appearance                   Overall:                   well developed          

                          2015                   None                

 

                    Full Exam - General                   Constitutional        

            general 

appearance                   Overall:                   in no acute distress    

                          2015                   None                

 

                    Full Exam - General                   Neurologic            

       mental status

                    Overall:                   oriented                   

2015                              None                

 

                    Full Exam - General                   Neurologic            

       mental status

                    Overall:                   alert                   

5 

                                        None                

 

                    Full Exam - General                   Psychiatric           

        mood and 

affect                    Overall:                   normal mood and affect     

 

                          2015                   None                

 

                    Full Exam - General                   Musculoskeletal       

            right 

lower extremity                         Inspection - right lower leg:           

      

                    swelling                   2015                   mini

mal                

 

                    Full Exam - General                   Musculoskeletal       

            right 

lower extremity                         Palpation - right lower leg:            

      

                    tender                   2015                   medial

ly                

 

                    Full Exam - General                   Constitutional        

            general 

appearance                   Overall:                   well nourished          

                          2015                   None                

 

                    Full Exam - General                   Constitutional        

            general 

appearance                   Overall:                   well developed          

                          2015                   None                

 

                    Full Exam - General                   Constitutional        

            general 

appearance                   Overall:                   in no acute distress    

                          2015                   None                

 

                    Full Exam - General                   Neurologic            

       mental status

                    Overall:                   alert                   03/19/201

5 

                                        None                

 

                    Full Exam - General                   Neurologic            

       mental status

                    Overall:                   oriented                   

2015                              None                

 

                    Full Exam - General                   Psychiatric           

        mood and 

affect                    Overall:                   normal mood and affect     

 

                          2015                   None                

 

                    Full Exam - General                   Respiratory           

        auscultation

                          Overall:                   breath sounds clear bilater

ally    

                          2015                   None                

 

                    Full Exam - General                   Cardiovascular        

           

auscultation of heart                   Overall:                   regular rate 

                          2015                   None                

 

                    Full Exam - General                   Cardiovascular        

           

auscultation of heart                   Overall:                   normal heart 

sounds                    2015                   None                

 

                    Full Exam - General                   Cardiovascular        

           

auscultation of heart                   S3 (ventricular gallop):                

                    present                   2015                   None 

               

 

                    Full Exam - General                   Cardiovascular        

           

auscultation of heart                   Murmur:                   previously 

known murmur unchanged                   2015                   None      

         

 

                    Full Exam - General                   Musculoskeletal       

            spine, 

ribs and pelvis                   Spine:                    tender @ thoracic 

spine                     2015                   None                

 

                    Full Exam - General                   Constitutional        

            general 

appearance                   Overall:                   well nourished          

                          2015                   None                

 

                    Full Exam - General                   Constitutional        

            general 

appearance                   Overall:                   well developed          

                          2015                   None                

 

                    Full Exam - General                   Constitutional        

            general 

appearance                   Overall:                   in no acute distress    

                          2015                   None                

 

                    Full Exam - General                   Neurologic            

       mental status

                    Overall:                   alert                   

5 

                                        None                

 

                    Full Exam - General                   Neurologic            

       mental status

                    Overall:                   oriented                   

2015                              None                

 

                    Full Exam - General                   Psychiatric           

        mood and 

affect                    Overall:                   normal mood and affect     

 

                          2015                   None                

 

                    Full Exam - General                   Respiratory           

        auscultation

                          Overall:                   breath sounds clear bilater

ally    

                          2015                   None                

 

                    Full Exam - General                   Cardiovascular        

           

auscultation of heart                   Overall:                   regular rate 

                          2015                   None                

 

                    Full Exam - General                   Cardiovascular        

           

auscultation of heart                   Overall:                   normal heart 

sounds                    2015                   None                

 

                    Full Exam - General                   Cardiovascular        

           

auscultation of heart                   S3 (ventricular gallop):                

                    present                   2015                   None 

               

 

                    Full Exam - General                   Cardiovascular        

           

auscultation of heart                   Murmur:                   previously 

known murmur unchanged                   2015                   None      

         

 

                    Full Exam - General                   Cardiovascular        

           

extremities                   Overall:                   no clubbing            

                          2015                   None                

 

                    Full Exam - General                   Cardiovascular        

           

extremities                   Overall:                   No edema               

                          2015                   None                

 

                    Full Exam - General                   Cardiovascular        

           

extremities                   Overall:                   No cyanosis            

                          2015                   None                

 

                    Full Exam - General                   Abdomen               

    abdominal exam  

                    Overall:                   no masses                   

2015                              None                

 

                    Full Exam - General                   Abdomen               

    abdominal exam  

                    Overall:                   no tenderness                   

2015                              None                

 

                    Full Exam - General                   Abdomen               

    abdominal exam  

                          Overall:                   normal bowel sounds        

          

                          2015                   None                

 

                    Full Exam - General                   Abdomen               

    abdominal exam  

                    Overall:                   soft                   2015

    

                                        None                

 

                    Full Exam - General                   Integument            

       inspection of

skin                   Location:                   right arm                   

2015                              upper posterior with irregular nevus    

           



 

                    Full Exam - General                   Ears/Nose/Throat      

             

internal nose                   Turbinates:                   erythema          

                          2014                   None                

 

                    Full Exam - General                   Ears/Nose/Throat      

             oral 

cavity/pharynx/larynx                    Oropharynx:                   postnasal

drainage                   2014                   None                

 

                    Full Exam - General                   Ears/Nose/Throat      

             oral 

cavity/pharynx/larynx                    Oropharynx:                   erythema 

                          2014                   None                

 

                    Full Exam - General                   Constitutional        

            general 

appearance                   Overall:                   well nourished          

                          2014                   None                

 

                    Full Exam - General                   Constitutional        

            general 

appearance                   Overall:                   in no acute distress    

                          2014                   None                

 

                    Full Exam - General                   Respiratory           

        auscultation

                          Overall:                   breath sounds clear bilater

ally    

                          2014                   None                

 

                    Full Exam - General                   Cardiovascular        

           

auscultation of heart                   Overall:                   regular rate 

                          2014                   None                

 

                    Full Exam - General                   Cardiovascular        

           

auscultation of heart                   Overall:                   normal heart 

sounds                    2014                   None                

 

                    Full Exam - General                   Cardiovascular        

           

auscultation of heart                   Overall:                   no murmurs   

                          2014                   None                

 

                    Full Exam - General                   Psychiatric           

        

orientation/consciousness                   Overall:                   oriented 

to person, place and time                   2014                   None   

            

 

                    Full Exam - General                   Ears/Nose/Throat      

             

otoscopic exam                   Right tympanic membrane:                   

effusion                   2014                   None                

 

                    Full Exam - General                   Ears/Nose/Throat      

             

otoscopic exam                   Left tympanic membrane:                   a 

normal exam                   2014                   None                

 

                    Full Exam - General                   Ears/Nose/Throat      

             

otoscopic exam                   Right tympanic membrane:                   loss

of landmarks                   2014                   None                

 

                    Full Exam - General                   Constitutional        

            general 

appearance                   Overall:                   well nourished          

                          2014                   None                

 

                    Full Exam - General                   Constitutional        

            general 

appearance                   Overall:                   well developed          

                          2014                   None                

 

                    Full Exam - General                   Constitutional        

            general 

appearance                   Overall:                   in no acute distress    

                          2014                   None                

 

                    Full Exam - General                   Neurologic            

       mental status

                    Overall:                   alert                   

4 

                                        None                

 

                    Full Exam - General                   Neurologic            

       mental status

                    Overall:                   oriented                   

2014                              None                

 

                    Full Exam - General                   Psychiatric           

        mood and 

affect                    Overall:                   normal mood and affect     

 

                          2014                   None                

 

                    Full Exam - General                   Respiratory           

        auscultation

                          Overall:                   breath sounds clear bilater

ally    

                          2014                   None                

 

                    Full Exam - General                   Cardiovascular        

           

auscultation of heart                   Overall:                   regular rate 

                          2014                   None                

 

                    Full Exam - General                   Cardiovascular        

           

auscultation of heart                   Overall:                   normal heart 

sounds                    2014                   None                

 

                    Full Exam - General                   Cardiovascular        

           

auscultation of heart                   S3 (ventricular gallop):                

                    present                   2014                   None 

               

 

                    Full Exam - General                   Cardiovascular        

           

auscultation of heart                   Murmur:                   previously 

known murmur unchanged                   2014                   None      

         

 

                    Full Exam - General                   Cardiovascular        

           

extremities                   Overall:                   no clubbing            

                          2014                   None                

 

                    Full Exam - General                   Cardiovascular        

           

extremities                   Overall:                   No cyanosis            

                          2014                   None                

 

                    Full Exam - General                   Cardiovascular        

           

extremities                   Edema present:                   non-pitting      

                          2014                   None                

 

                    Full Exam - General                   Constitutional        

            general 

appearance                   Overall:                   well nourished          

                          2014                   None                

 

                    Full Exam - General                   Constitutional        

            general 

appearance                   Overall:                   well developed          

                          2014                   None                

 

                    Full Exam - General                   Constitutional        

            general 

appearance                   Overall:                   in no acute distress    

                          2014                   None                

 

                    Full Exam - General                   Neurologic            

       mental status

                    Overall:                   alert                   

4 

                                        None                

 

                    Full Exam - General                   Neurologic            

       mental status

                    Overall:                   oriented                   

2014                              None                

 

                    Full Exam - General                   Psychiatric           

        mood and 

affect                    Overall:                   normal mood and affect     

 

                          2014                   None                

 

                    Full Exam - General                   Ears/Nose/Throat      

             

otoscopic exam                   Overall:                   external auditory 

canals clear                   2014                   None                

 

                    Full Exam - General                   Ears/Nose/Throat      

             

otoscopic exam                   Overall:                   tympanic membranes 

clear                     2014                   None                

 

                    Full Exam - General                   Ears/Nose/Throat      

             

internal nose                   Turbinates:                   hypertrophy       

                          2014                   None                

 

                    Full Exam - General                   Ears/Nose/Throat      

             oral 

cavity/pharynx/larynx                    Overall:                   oral mucosa 

clear                     2014                   None                

 

                    Full Exam - General                   Respiratory           

        auscultation

                          Overall:                   breath sounds clear bilater

ally    

                          2014                   None                

 

                    Full Exam - General                   Cardiovascular        

           

auscultation of heart                   Overall:                   regular rate 

                          2014                   None                

 

                    Full Exam - General                   Cardiovascular        

           

auscultation of heart                   Overall:                   normal heart 

sounds                    2014                   None                

 

                    Full Exam - General                   Cardiovascular        

           

auscultation of heart                   S3 (ventricular gallop):                

                    present                   2014                   None 

               

 

                    Full Exam - General                   Cardiovascular        

           

auscultation of heart                   Murmur:                   previously 

known murmur unchanged                   2014                   None      

         

 

                    Full Exam - General                   Constitutional        

            general 

appearance                   Nourishment:                   obese               

                          2014                   None                

 

                    Full Exam - General                   Constitutional        

            general 

appearance                   Overall:                   well developed          

                          2014                   None                

 

                    Full Exam - General                   Constitutional        

            general 

appearance                   Overall:                   in no acute distress    

                          2014                   None                

 

                    Full Exam - General                   Ears/Nose/Throat      

             

external ear                   Overall:                   normal appearance     

                          2014                   None                

 

                    Full Exam - General                   Ears/Nose/Throat      

             

otoscopic exam                   Overall:                   tympanic membranes 

clear                     2014                   None                

 

                    Full Exam - General                   Ears/Nose/Throat      

             

otoscopic exam                   Overall:                   external auditory 

canals clear                   2014                   None                

 

                    Full Exam - General                   Ears/Nose/Throat      

             

otoscopic exam                          Right external auditory canal:          

       

                    partial cerumen occlusion                   2014      

             None  

             

 

                    Full Exam - General                   Ears/Nose/Throat      

             

otoscopic exam                   Right tympanic membrane:                   not 

visualized                   2014                   None                

 

                    Full Exam - General                   Ears/Nose/Throat      

             

otoscopic exam                   Left tympanic membrane:                   a 

normal exam                   2014                   None                

 

                    Full Exam - General                   Respiratory           

        auscultation

                          Overall:                   breath sounds clear bilater

ally    

                          2014                   None                

 

                    Full Exam - General                   Cardiovascular        

           

auscultation of heart                   Overall:                   normal heart 

sounds                    2014                   None                

 

                    Full Exam - General                   Cardiovascular        

           

auscultation of heart                   Overall:                   regular rate 

                          2014                   None                

 

                    Full Exam - General                   Psychiatric           

        

orientation/consciousness                   Overall:                   oriented 

to person, place and time                   2014                   None   

            

 

                    Full Exam - General                   Respiratory           

        auscultation

                          Overall:                   breath sounds clear bilater

ally    

                          10/15/2013                   None                

 

                    Full Exam - General                   Constitutional        

            general 

appearance                   Overall:                   well developed          

                          10/15/2013                   None                

 

                    Full Exam - General                   Constitutional        

            general 

appearance                   Overall:                   in no acute distress    

                          10/15/2013                   None                

 

                    Full Exam - General                   Constitutional        

            general 

appearance                   Nourishment:                   obese               

                          10/15/2013                   None                

 

                    Full Exam - General                   Ears/Nose/Throat      

             

otoscopic exam                   Left tympanic membrane:                   not 

visualized                   10/15/2013                   due to cerumen        

       

 

                    Full Exam - General                   Ears/Nose/Throat      

             

otoscopic exam                   Right tympanic membrane:                   

abnormal light reflex                   10/15/2013                   None       

        

 

                    Full Exam - General                   Cardiovascular        

           

auscultation of heart                   Overall:                   regular rate 

                          10/15/2013                   None                

 

                    Full Exam - General                   Cardiovascular        

           

auscultation of heart                   Overall:                   normal heart 

sounds                    10/15/2013                   None                

 

                    Full Exam - General                   Psychiatric           

        

orientation/consciousness                   Overall:                   oriented 

to person, place and time                   10/15/2013                   None   

            

 

                    Full Exam - General                   Constitutional        

            general 

appearance                   Overall:                   well nourished          

                          2013                   None                

 

                    Full Exam - General                   Constitutional        

            general 

appearance                   Overall:                   well developed          

                          2013                   None                

 

                    Full Exam - General                   Constitutional        

            general 

appearance                   Overall:                   in no acute distress    

                          2013                   None                

 

                    Full Exam - General                   Neurologic            

       mental status

                    Overall:                   alert                   

3 

                                        None                

 

                    Full Exam - General                   Neurologic            

       mental status

                    Overall:                   oriented                   

2013                              None                

 

                    Full Exam - General                   Psychiatric           

        mood and 

affect                    Overall:                   normal mood and affect     

 

                          2013                   None                

 

                    Full Exam - General                   Respiratory           

        auscultation

                          Overall:                   breath sounds clear bilater

ally    

                          2013                   None                

 

                    Full Exam - General                   Cardiovascular        

           

auscultation of heart                   Overall:                   regular rate 

                          2013                   None                

 

                    Full Exam - General                   Cardiovascular        

           

auscultation of heart                   Overall:                   normal heart 

sounds                    2013                   None                

 

                    Full Exam - General                   Cardiovascular        

           

auscultation of heart                   S3 (ventricular gallop):                

                    present                   2013                   None 

               

 

                    Full Exam - General                   Cardiovascular        

           

extremities                   Overall:                   no clubbing            

                          2013                   None                

 

                    Full Exam - General                   Cardiovascular        

           

extremities                   Overall:                   No cyanosis            

                          2013                   None                

 

                    Full Exam - General                   Cardiovascular        

           

auscultation of heart                   Murmur:                   previously 

known murmur unchanged                   2013                   None      

         

 

                    Full Exam - General                   Constitutional        

            general 

appearance                   Overall:                   well nourished          

                          2013                   None                

 

                    Full Exam - General                   Constitutional        

            general 

appearance                   Overall:                   well developed          

                          2013                   None                

 

                    Full Exam - General                   Constitutional        

            general 

appearance                   Overall:                   in no acute distress    

                          2013                   None                

 

                    Full Exam - General                   Neurologic            

       mental status

                    Overall:                   alert                   

3 

                                        None                

 

                    Full Exam - General                   Neurologic            

       mental status

                    Overall:                   oriented                   

2013                              None                

 

                    Full Exam - General                   Psychiatric           

        mood and 

affect                    Overall:                   normal mood and affect     

 

                          2013                   None                

 

                    Full Exam - General                   Ears/Nose/Throat      

             

otoscopic exam                   Overall:                   external auditory 

canals clear                   2013                   None                

 

                    Full Exam - General                   Ears/Nose/Throat      

             

otoscopic exam                   Left tympanic membrane:                   air-

fluid level                   2013                   None                

 

                    Full Exam - General                   Ears/Nose/Throat      

             

otoscopic exam                   Right tympanic membrane:                   air-

fluid level                   2013                   None                

 

                    Full Exam - General                   Ears/Nose/Throat      

             

internal nose                   Turbinates:                   hypertrophy       

                          2013                   None                

 

                    Full Exam - General                   Ears/Nose/Throat      

             

internal nose                   Turbinates:                   erythema          

                          2013                   None                

 

                    Full Exam - General                   Ears/Nose/Throat      

             oral 

cavity/pharynx/larynx                    Oropharynx:                   postnasal

drainage                   2013                   None                

 

                    Full Exam - General                   Respiratory           

        auscultation

                          Overall:                   breath sounds clear bilater

ally    

                          2013                   None                

 

                    Full Exam - General                   Cardiovascular        

           

auscultation of heart                   Overall:                   regular rate 

                          2013                   None                

 

                    Full Exam - General                   Cardiovascular        

           

auscultation of heart                   Overall:                   normal heart 

sounds                    2013                   None                

 

                    Full Exam - General                   Cardiovascular        

           

auscultation of heart                   S3 (ventricular gallop):                

                    present                   2013                   None 

               

 

                    Full Exam - General                   Cardiovascular        

           

auscultation of heart                   Murmur:                   previously 

known murmur unchanged                   2013                   None      

         

 

                    Full Exam - General                   Constitutional        

            general 

appearance                   Overall:                   well nourished          

                          2013                   None                

 

                    Full Exam - General                   Constitutional        

            general 

appearance                   Overall:                   well developed          

                          2013                   None                

 

                    Full Exam - General                   Constitutional        

            general 

appearance                   Overall:                   in no acute distress    

                          2013                   None                

 

                    Full Exam - General                   Neurologic            

       mental status

                    Overall:                   alert                   

3 

                                        None                

 

                    Full Exam - General                   Neurologic            

       mental status

                    Overall:                   oriented                   

2013                              None                

 

                    Full Exam - General                   Psychiatric           

        mood and 

affect                    Overall:                   normal mood and affect     

 

                          2013                   None                

 

                    Full Exam - General                   Ears/Nose/Throat      

             

otoscopic exam                   Overall:                   external auditory 

canals clear                   2013                   None                

 

                    Full Exam - General                   Ears/Nose/Throat      

             

otoscopic exam                   Overall:                   tympanic membranes 

clear                     2013                   None                

 

                    Full Exam - General                   Ears/Nose/Throat      

             

internal nose                   Overall:                   bilateral nasal 

cavities clear                   2013                   None              

 

 

                    Full Exam - General                   Ears/Nose/Throat      

             oral 

cavity/pharynx/larynx                    Overall:                   oral mucosa 

clear                     2013                   None                

 

                    Full Exam - General                   Constitutional        

            general 

appearance                   Overall:                   well nourished          

                          05/10/2013                   None                

 

                    Full Exam - General                   Constitutional        

            general 

appearance                   Overall:                   well developed          

                          05/10/2013                   None                

 

                    Full Exam - General                   Constitutional        

            general 

appearance                   Overall:                   in no acute distress    

                          05/10/2013                   None                

 

                    Full Exam - General                   Ears/Nose/Throat      

             

otoscopic exam                   Overall:                   external auditory 

canals clear                   05/10/2013                   no tragal or pinna 

pain with movement                

 

                    Full Exam - General                   Ears/Nose/Throat      

             

otoscopic exam                   Overall:                   tympanic membranes 

clear                     05/10/2013                   right TM has perforation 

(appears old?) at 6:00 position                

 

                    Full Exam - General                   Ears/Nose/Throat      

             

otoscopic exam                   Perforation:                   right           

                          05/10/2013                   None                

 

                    Full Exam - General                   Ears/Nose/Throat      

             

internal nose                   Overall:                   bilateral nasal 

cavities clear                   05/10/2013                   None              

 

 

                    Full Exam - General                   Ears/Nose/Throat      

             

internal nose                   Overall:                   turbinates benign    

                          05/10/2013                   None                

 

                    Full Exam - General                   Ears/Nose/Throat      

             

internal nose                   Overall:                   no drainage          

                          05/10/2013                   None                

 

                    Full Exam - General                   Ears/Nose/Throat      

             

internal nose                   Sinus tenderness:                   left 

maxillary                   05/10/2013                   marked                

 

                    Full Exam - General                   Ears/Nose/Throat      

             

internal nose                   Sinus tenderness:                   right 

maxillary                   05/10/2013                   marked                

 

                    Full Exam - General                   Ears/Nose/Throat      

             

internal nose                   Sinus tenderness:                   right 

frontal                   05/10/2013                   None                

 

                    Full Exam - General                   Ears/Nose/Throat      

             

internal nose                   Sinus tenderness:                   left frontal

                          05/10/2013                   None                

 

                    Full Exam - General                   Ears/Nose/Throat      

             oral 

cavity/pharynx/larynx                    Overall:                   

oropharyngeal mucosa clear                   05/10/2013                   None  

             

 

                    Full Exam - General                   Respiratory           

        auscultation

                          Overall:                   breath sounds clear bilater

ally    

                          05/10/2013                   None                

 

                    Full Exam - General                   Cardiovascular        

           

auscultation of heart                   Overall:                   regular rate 

                          05/10/2013                   None                

 

                    Full Exam - General                   Cardiovascular        

           

auscultation of heart                   Overall:                   normal heart 

sounds                    05/10/2013                   None                

 

                    Full Exam - General                   Cardiovascular        

           

auscultation of heart                   Overall:                   no murmurs   

                          05/10/2013                   None                

 

                    Full Exam - General                   Constitutional        

            general 

appearance                   Overall:                   well nourished          

                          2013                   None                

 

                    Full Exam - General                   Constitutional        

            general 

appearance                   Overall:                   well developed          

                          2013                   None                

 

                    Full Exam - General                   Constitutional        

            general 

appearance                   Overall:                   in no acute distress    

                          2013                   None                

 

                    Full Exam - General                   Neurologic            

       mental status

                    Overall:                   alert                   

3 

                                        None                

 

                    Full Exam - General                   Neurologic            

       mental status

                    Overall:                   oriented                   

2013                              None                

 

                    Full Exam - General                   Psychiatric           

        mood and 

affect                    Overall:                   normal mood and affect     

 

                          2013                   None                

 

                    Full Exam - General                   Chest/Breast          

         breast and 

axillae palpation                       Left lower inner quadrant:              

    

                    tender                   2013                   None  

              

 

                    Full Exam - General                   Chest/Breast          

         breast and 

axillae palpation                       Left lower inner quadrant:              

    

                    no mass                   2013                   None 

               

 

                    Full Exam - General                   Chest/Breast          

         breast and 

axillae palpation                       Left upper outer quadrant:              

    

                    no mass                   2013                   None 

               

 

                    Full Exam - General                   Chest/Breast          

         breast and 

axillae palpation                       Left upper inner quadrant:              

    

                    no mass                   2013                   None 

               

 

                    Full Exam - General                   Chest/Breast          

         breast and 

axillae palpation                       Left lower outer quadrant:              

    

                    no mass                   2013                   None 

               

 

                    Full Exam - General                   Chest/Breast          

         breast and 

axillae palpation                       Right lower inner quadrant:             

    

                    tender                   2013                   None  

              

 

                    Full Exam - General                   Chest/Breast          

         breast and 

axillae palpation                       Right upper inner quadrant:             

    

                    no mass                   2013                   None 

               

 

                    Full Exam - General                   Chest/Breast          

         breast and 

axillae palpation                       Right upper outer quadrant:             

    

                    no mass                   2013                   None 

               

 

                    Full Exam - General                   Chest/Breast          

         breast and 

axillae palpation                       Right lower outer quadrant:             

    

                    no mass                   2013                   None 

               

 

                    Full Exam - General                   Constitutional        

            general 

appearance                   Overall:                   well nourished          

                          2012                   None                

 

                    Full Exam - General                   Constitutional        

            general 

appearance                   Overall:                   well developed          

                          2012                   None                

 

                    Full Exam - General                   Constitutional        

            general 

appearance                   Overall:                   in no acute distress    

                          2012                   None                

 

                    Full Exam - General                   Neurologic            

       mental status

                    Overall:                   alert                   

2 

                                        None                

 

                    Full Exam - General                   Neurologic            

       mental status

                    Overall:                   oriented                   

2012                              None                

 

                    Full Exam - General                   Psychiatric           

        mood and 

affect                    Overall:                   normal mood and affect     

 

                          2012                   None                

 

                    Full Exam - General                   Respiratory           

        auscultation

                          Overall:                   breath sounds clear bilater

ally    

                          2012                   None                

 

                    Full Exam - General                   Cardiovascular        

           

auscultation of heart                   Overall:                   regular rate 

                          2012                   None                

 

                    Full Exam - General                   Cardiovascular        

           

auscultation of heart                   Overall:                   normal heart 

sounds                    2012                   None                

 

                    Full Exam - General                   Cardiovascular        

           

auscultation of heart                   S3 (ventricular gallop):                

                    present                   2012                   None 

               

 

                    Full Exam - General                   Cardiovascular        

           

auscultation of heart                   Murmur:                   previously 

known murmur unchanged                   2012                   None      

         

 

                    Full Exam - General                   Ears/Nose/Throat      

             

otoscopic exam                          Left external auditory canal:           

       

                    complete cerumen impaction                   2012     

              None  

             

 

                    Full Exam - General                   Ears/Nose/Throat      

             

otoscopic exam                          Right external auditory canal:          

       

                    partial cerumen occlusion                   2012      

             None  

             

 

                    Full Exam - General                   Ears/Nose/Throat      

             

otoscopic exam                   Left tympanic membrane:                   

abnormal light reflex                   2012                   None       

        

 

                    Full Exam - General                   Ears/Nose/Throat      

             

otoscopic exam                   Right tympanic membrane:                   

abnormal light reflex                   2012                   None       

        

 

                    Full Exam - General                   Ears/Nose/Throat      

             

internal nose                   Turbinates:                   hypertrophy       

                          2012                   None                

 

                    Full Exam - General                   Ears/Nose/Throat      

             oral 

cavity/pharynx/larynx                    Overall:                   oral mucosa 

clear                     2012                   None                

 

                    Full Exam - General                   Abdomen               

    abdominal exam  

                    Overall:                   no masses                   

2012                              None                

 

                    Full Exam - General                   Abdomen               

    abdominal exam  

                          Overall:                   normal bowel sounds        

          

                          2012                   None                

 

                    Full Exam - General                   Abdomen               

    abdominal exam  

                    Overall:                   soft                   2012

    

                                        None                

 

                    Full Exam - General                   Abdomen               

    abdominal exam  

                          Right upper quadrant:                   tender to palp

ation     

                          2012                   None                

 

                    Full Exam - General                   Abdomen               

    abdominal exam  

                          Epigastric:                   tender to palpation     

          

                          2012                   None                

 

                    Full Exam - General                   Constitutional        

            general 

appearance                   Overall:                   well nourished          

                          10/23/2012                   None                

 

                    Full Exam - General                   Constitutional        

            general 

appearance                   Overall:                   well developed          

                          10/23/2012                   None                

 

                    Full Exam - General                   Constitutional        

            general 

appearance                   Overall:                   in no acute distress    

                          10/23/2012                   None                

 

                    Full Exam - General                   Neurologic            

       mental status

                    Overall:                   alert                   10/23/201

2 

                                        None                

 

                    Full Exam - General                   Neurologic            

       mental status

                    Overall:                   oriented                   

10/23/2012                              None                

 

                    Full Exam - General                   Psychiatric           

        mood and 

affect                    Overall:                   normal mood and affect     

 

                          10/23/2012                   None                

 

                    Full Exam - General                   Abdomen               

    abdominal exam  

                    Overall:                   no masses                   

10/23/2012                              None                

 

                    Full Exam - General                   Abdomen               

    abdominal exam  

                          Overall:                   normal bowel sounds        

          

                          10/23/2012                   None                

 

                    Full Exam - General                   Abdomen               

    abdominal exam  

                    Overall:                   soft                   10/23/2012

    

                                        None                

 

                    Full Exam - General                   Abdomen               

    abdominal exam  

                          Epigastric:                   tender to palpation     

          

                          10/23/2012                   None                

 

                    Full Exam - General                   Abdomen               

    abdominal exam  

                          Left upper quadrant:                   tender to palpa

tion      

                          10/23/2012                   None                

 

                    Full Exam - General                   Abdomen               

    abdominal exam  

                          Left lower quadrant:                   tender to palpa

tion      

                          10/23/2012                   None                

 

                    Full Exam - General                   Abdomen               

    abdominal exam  

                          Right upper quadrant:                   non-tender to 

palpation 

                          10/23/2012                   None                

 

                    Full Exam - General                   Abdomen               

    abdominal exam  

                          Right lower quadrant:                   tender to palp

ation     

                          10/23/2012                   None                

 

                    Full Exam - General                   Constitutional        

            general 

appearance                   Overall:                   well nourished          

                          2012                   None                

 

                    Full Exam - General                   Constitutional        

            general 

appearance                   Overall:                   well developed          

                          2012                   None                

 

                    Full Exam - General                   Constitutional        

            general 

appearance                   Evidence of Distress:                   anxious    

                          2012                   None                

 

                    Full Exam - General                   Respiratory           

        auscultation

                          Overall:                   breath sounds clear bilater

ally    

                          2012                   None                

 

                    Full Exam - General                   Cardiovascular        

           

auscultation of heart                   Overall:                   regular rate 

                          2012                   None                

 

                    Full Exam - General                   Cardiovascular        

           

auscultation of heart                   Overall:                   normal heart 

sounds                    2012                   None                

 

                    Full Exam - General                   Cardiovascular        

           

auscultation of heart                   Murmur:                   previously 

known murmur unchanged                   2012                   None      

         

 

                    Full Exam - General                   Cardiovascular        

           

auscultation of heart                   S3 (ventricular gallop):                

                    present                   2012                   None 

               

 

                    Full Exam - General                   Neurologic            

       mental status

                    Overall:                   alert                   

2 

                                        None                

 

                    Full Exam - General                   Neurologic            

       mental status

                    Overall:                   oriented                   

2012                              None                

 

                    Full Exam - General                   Psychiatric           

        mood and 

affect                    Overall:                   normal mood and affect     

 

                          2012                   None                

 

                    Full Exam - General                   Constitutional        

            general 

appearance                   Overall:                   well nourished          

                          2012                   None                

 

                    Full Exam - General                   Constitutional        

            general 

appearance                   Overall:                   well developed          

                          2012                   None                

 

                    Full Exam - General                   Constitutional        

            general 

appearance                   Overall:                   in no acute distress    

                          2012                   None                

 

                    Full Exam - General                   Neurologic            

       mental status

                    Overall:                   alert                   

2 

                                        None                

 

                    Full Exam - General                   Neurologic            

       mental status

                    Overall:                   oriented                   

2012                              None                

 

                    Full Exam - General                   Psychiatric           

        mood and 

affect                    Overall:                   normal mood and affect     

 

                          2012                   None                

 

                    Full Exam - General                   Respiratory           

        auscultation

                          Overall:                   breath sounds clear bilater

ally    

                          2012                   None                

 

                    Full Exam - General                   Cardiovascular        

           

auscultation of heart                   Overall:                   regular rate 

                          2012                   None                

 

                    Full Exam - General                   Cardiovascular        

           

auscultation of heart                   Overall:                   normal heart 

sounds                    2012                   None                

 

                    Full Exam - General                   Cardiovascular        

           

auscultation of heart                   S3 (ventricular gallop):                

                    present                   2012                   None 

               

 

                    Full Exam - General                   Cardiovascular        

           

auscultation of heart                   Murmur:                   previously 

known murmur unchanged                   2012                   None      

         

 

                    Full Exam - General                   Cardiovascular        

           

extremities                   Overall:                   no clubbing            

                          2012                   None                

 

                    Full Exam - General                   Cardiovascular        

           

extremities                   Overall:                   No edema               

                          2012                   None                

 

                    Full Exam - General                   Cardiovascular        

           

extremities                   Overall:                   No cyanosis            

                          2012                   None                

 

                    Full Exam - General                   Constitutional        

            general 

appearance                   Overall:                   well nourished          

                          2012                   None                

 

                    Full Exam - General                   Constitutional        

            general 

appearance                   Overall:                   well developed          

                          2012                   None                

 

                    Full Exam - General                   Constitutional        

            general 

appearance                   Overall:                   in no acute distress    

                          2012                   None                

 

                    Full Exam - General                   Neurologic            

       mental status

                    Overall:                   alert                   

2 

                                        None                

 

                    Full Exam - General                   Neurologic            

       mental status

                    Overall:                   oriented                   

2012                              None                

 

                    Full Exam - General                   Psychiatric           

        mood and 

affect                    Overall:                   normal mood and affect     

 

                          2012                   None                

 

                    Full Exam - General                   Musculoskeletal       

            spine, 

ribs and pelvis                   Spine:                    tender @ lumbar spin

e

                          2012                   None                

 

                    Full Exam - General                   Constitutional        

            general 

appearance                   Overall:                   well nourished          

                          2012                   None                

 

                    Full Exam - General                   Constitutional        

            general 

appearance                   Overall:                   well developed          

                          2012                   None                

 

                    Full Exam - General                   Constitutional        

            general 

appearance                   Overall:                   in no acute distress    

                          2012                   None                

 

                    Full Exam - General                   Neurologic            

       mental status

                    Overall:                   alert                   

2 

                                        None                

 

                    Full Exam - General                   Psychiatric           

        mood and 

affect                    Overall:                   normal mood and affect     

 

                          2012                   None                

 

                    Full Exam - General                   Musculoskeletal       

            spine, 

ribs and pelvis                   Sacroiliac joints:                   tender 

right sacroiliac joint                   2012                   None      

         

 

                    Full Exam - General                   Musculoskeletal       

            spine, 

ribs and pelvis                   Spine:                    tender @ thoracic 

spine                     2012                   None                

 

                    Full Exam - General                   Musculoskeletal       

            spine, 

ribs and pelvis                   Spine:                    tender @ lumbar spin

e

                          2012                   None                

 

                    Full Exam - General                   Constitutional        

            general 

appearance                   Overall:                   well nourished          

                          2012                   None                

 

                    Full Exam - General                   Constitutional        

            general 

appearance                   Overall:                   well developed          

                          2012                   None                

 

                    Full Exam - General                   Constitutional        

            general 

appearance                   Overall:                   in no acute distress    

                          2012                   None                

 

                    Full Exam - General                   Musculoskeletal       

            spine, 

ribs and pelvis                   Spine:                    tender @ thoracic 

spine                     2012                   None                

 

                    Full Exam - General                   Neurologic            

       mental status

                    Overall:                   alert                   

2 

                                        None                

 

                    Full Exam - General                   Neurologic            

       mental status

                    Overall:                   oriented                   

2012                              None                

 

                    Full Exam - General                   Psychiatric           

        mood and 

affect                    Overall:                   normal mood and affect     

 

                          2012                   None                

 

                    Full Exam - General                   Constitutional        

            general 

appearance                   Overall:                   well nourished          

                          2012                   None                

 

                    Full Exam - General                   Constitutional        

            general 

appearance                   Overall:                   well developed          

                          2012                   None                

 

                    Full Exam - General                   Constitutional        

            general 

appearance                   Overall:                   in no acute distress    

                          2012                   None                

 

                    Full Exam - General                   Neurologic            

       mental status

                    Overall:                   alert                   

2 

                                        None                

 

                    Full Exam - General                   Neurologic            

       mental status

                    Overall:                   oriented                   

2012                              None                

 

                    Full Exam - General                   Psychiatric           

        mood and 

affect                    Overall:                   normal mood and affect     

 

                          2012                   None                

 

                    Full Exam - General                   Musculoskeletal       

            spine, 

ribs and pelvis                   Spine:                    tender @ thoracic 

spine                     2012                   None                

 

                    Full Exam - General                   Musculoskeletal       

            spine, 

ribs and pelvis                   Spine:                    tender @ lumbar spin

e

                          2012                   None                

 

                    Full Exam - General                   Musculoskeletal       

            gait and

station                   Overall:                   normal station             

                          2012                   None                

 

                    Full Exam - General                   Musculoskeletal       

            gait and

station                   Gait:                   antalgic                   

2012                              None                

 

                    Full Exam - General                   Musculoskeletal       

            spine, 

ribs and pelvis                   Sacroiliac joints:                   tender 

right sacroiliac joint                   2012                   None      

         

 

                    Full Exam - General                   Musculoskeletal       

            spine, 

ribs and pelvis                   Sacroiliac joints:                   tender 

left sacroiliac joint                   2012                   None       

        

 

                    Full Exam - General                   Constitutional        

            general 

appearance                   Overall:                   in no acute distress    

                          2012                   None                

 

                    Full Exam - General                   Constitutional        

            general 

appearance                   Overall:                   well developed          

                          2012                   None                

 

                    Full Exam - General                   Constitutional        

            general 

appearance                   Overall:                   well nourished          

                          2012                   None                

 

                    Full Exam - General                   Neurologic            

       mental status

                    Overall:                   alert                   

2 

                                        None                

 

                    Full Exam - General                   Neurologic            

       mental status

                    Overall:                   oriented                   

2012                              None                

 

                    Full Exam - General                   Psychiatric           

        mood and 

affect                    Overall:                   normal mood and affect     

 

                          2012                   None                

 

                    Full Exam - General                   Respiratory           

        auscultation

                          Right upper lung field:                   a normal exa

m       

                          2012                   None                

 

                    Full Exam - General                   Respiratory           

        auscultation

                          Right middle lung field:                   a normal ex

am      

                          2012                   None                

 

                    Full Exam - General                   Respiratory           

        auscultation

                          Right lower lung field:                   a normal exa

m       

                          2012                   None                

 

                    Full Exam - General                   Respiratory           

        auscultation

                          Left lower lung field:                   a normal exam

        

                          2012                   None                

 

                    Full Exam - General                   Respiratory           

        auscultation

                          Overall:                   breath sounds clear bilater

ally    

                          2012                   None                

 

                    Full Exam - General                   Respiratory           

        auscultation

                          Left upper lung field:                   a normal exam

        

                          2012                   None                

 

                    Full Exam - General                   Respiratory           

        auscultation

                    Diffuse:                   a normal exam                   

2012                              None                

 

                    Full Exam - General                   Cardiovascular        

           

auscultation of heart                   Overall:                   no murmurs   

                          2012                   None                

 

                    Full Exam - General                   Cardiovascular        

           

auscultation of heart                   Overall:                   regular rate 

                          2012                   None                

 

                    Full Exam - General                   Cardiovascular        

           

auscultation of heart                   Overall:                   normal heart 

sounds                    2012                   None                

 

                    Full Exam - General                   Cardiovascular        

           

auscultation of heart                   S1:                       a normal exam 

    

                          2012                   None                

 

                    Full Exam - General                   Cardiovascular        

           

auscultation of heart                   S2:                       a normal exam 

    

                          2012                   None                

 

                    Full Exam - General                   Cardiovascular        

           

auscultation of heart                   Rhythm:                   regular rhythm

                          2012                   None                

 

                    Full Exam - General                   Cardiovascular        

           

auscultation of heart                   Rate:                     regular rate  

  

                          2012                   None                

 

                    Full Exam - General                   Cardiovascular        

           

auscultation of heart                   S3 (ventricular gallop):                

                    present                   2012                   None 

               

 

                    Full Exam - General                   Cardiovascular        

           

auscultation of heart                   Murmur:                   previously 

known murmur unchanged                   2012                   None      

         

 

                    Full Exam - General                   Cardiovascular        

           

extremities                   Overall:                   no clubbing            

                          2012                   None                

 

                    Full Exam - General                   Cardiovascular        

           

extremities                   Overall:                   No edema               

                          2012                   None                

 

                    Full Exam - General                   Cardiovascular        

           

extremities                   Overall:                   No cyanosis            

                          2012                   None                

 

                    Full Exam - General                   Constitutional        

            general 

appearance                   Overall:                   in no acute distress    

                          2011                   None                

 

                    Full Exam - General                   Constitutional        

            general 

appearance                   Overall:                   well developed          

                          2011                   None                

 

                    Full Exam - General                   Constitutional        

            general 

appearance                   Overall:                   well nourished          

                          2011                   None                

 

                    Full Exam - General                   Neurologic            

       mental status

                    Overall:                   alert                   

1 

                                        None                

 

                    Full Exam - General                   Neurologic            

       mental status

                    Overall:                   oriented                   

2011                              None                

 

                    Full Exam - General                   Psychiatric           

        mood and 

affect                    Overall:                   normal mood and affect     

 

                          2011                   None                

 

                    Full Exam - General                   Cardiovascular        

           

auscultation of heart                   Overall:                   no murmurs   

                          2011                   None                

 

                    Full Exam - General                   Cardiovascular        

           

auscultation of heart                   Overall:                   regular rate 

                          2011                   None                

 

                    Full Exam - General                   Cardiovascular        

           

auscultation of heart                   Overall:                   normal heart 

sounds                    2011                   None                

 

                    Full Exam - General                   Cardiovascular        

           

auscultation of heart                   S1:                       a normal exam 

    

                          2011                   None                

 

                    Full Exam - General                   Cardiovascular        

           

auscultation of heart                   S2:                       a normal exam 

    

                          2011                   None                

 

                    Full Exam - General                   Cardiovascular        

           

auscultation of heart                   Rhythm:                   regular rhythm

                          2011                   None                

 

                    Full Exam - General                   Cardiovascular        

           

auscultation of heart                   Rate:                     regular rate  

  

                          2011                   None                

 

                    Full Exam - General                   Cardiovascular        

           

auscultation of heart                   S3 (ventricular gallop):                

                    present                   2011                   None 

               

 

                    Full Exam - General                   Respiratory           

        auscultation

                          Right upper lung field:                   a normal exa

m       

                          2011                   None                

 

                    Full Exam - General                   Respiratory           

        auscultation

                          Right middle lung field:                   a normal ex

am      

                          2011                   None                

 

                    Full Exam - General                   Respiratory           

        auscultation

                          Right lower lung field:                   a normal exa

m       

                          2011                   None                

 

                    Full Exam - General                   Respiratory           

        auscultation

                          Left lower lung field:                   a normal exam

        

                          2011                   None                

 

                    Full Exam - General                   Respiratory           

        auscultation

                          Overall:                   breath sounds clear bilater

ally    

                          2011                   None                

 

                    Full Exam - General                   Respiratory           

        auscultation

                          Left upper lung field:                   a normal exam

        

                          2011                   None                

 

                    Full Exam - General                   Respiratory           

        auscultation

                    Diffuse:                   a normal exam                   

2011                              None                

 

                    Full Exam - General                   Ears/Nose/Throat      

             

otoscopic exam                   Overall:                   tympanic membranes 

clear                     2011                   None                

 

                    Full Exam - General                   Ears/Nose/Throat      

             

internal nose                   Turbinates:                   hypertrophy       

                          2011                   None                

 

                    Full Exam - General                   Ears/Nose/Throat      

             

internal nose                   Turbinates:                   erythema          

                          2011                   None                

 

                    Full Exam - General                   Ears/Nose/Throat      

             oral 

cavity/pharynx/larynx                    Overall:                   oral mucosa 

clear                     2011                   None                

 

                    Full Exam - General                   Neck                  

 inspection of neck 

                    Overall:                   normal size                   

2011                              None                

 

                    Full Exam - General                   Neck                  

 inspection of neck 

                    Overall:                   no masses                   

2011                              None                

 

                    Full Exam - General                   Ears/Nose/Throat      

             

otoscopic exam                          Left external auditory canal:           

       

                    complete cerumen impaction                   2011     

              None  

             

 

                    Full Exam - General                   Ears/Nose/Throat      

             

otoscopic exam                          Right external auditory canal:          

       

                    complete cerumen impaction                   2011     

              None 

              

 

                    Full Exam - General                   Constitutional        

            general 

appearance                   Overall:                   in no acute distress    

                          2011                   None                

 

                    Full Exam - General                   Constitutional        

            general 

appearance                   Overall:                   well developed          

                          2011                   None                

 

                    Full Exam - General                   Constitutional        

            general 

appearance                   Overall:                   well nourished          

                          2011                   None                

 

                    Full Exam - General                   Neurologic            

       mental status

                    Overall:                   alert                   

1 

                                        None                

 

                    Full Exam - General                   Neurologic            

       mental status

                    Overall:                   oriented                   

2011                              None                

 

                    Full Exam - General                   Psychiatric           

        mood and 

affect                    Overall:                   normal mood and affect     

 

                          2011                   None                

 

                    Full Exam - General                   Respiratory           

        auscultation

                          Right upper lung field:                   a normal exa

m       

                          2011                   None                

 

                    Full Exam - General                   Respiratory           

        auscultation

                          Right middle lung field:                   a normal ex

am      

                          2011                   None                

 

                    Full Exam - General                   Respiratory           

        auscultation

                          Right lower lung field:                   a normal exa

m       

                          2011                   None                

 

                    Full Exam - General                   Respiratory           

        auscultation

                          Left lower lung field:                   a normal exam

        

                          2011                   None                

 

                    Full Exam - General                   Respiratory           

        auscultation

                          Overall:                   breath sounds clear bilater

ally    

                          2011                   None                

 

                    Full Exam - General                   Respiratory           

        auscultation

                          Left upper lung field:                   a normal exam

        

                          2011                   None                

 

                    Full Exam - General                   Respiratory           

        auscultation

                    Diffuse:                   a normal exam                   

2011                              None                

 

                    Full Exam - General                   Cardiovascular        

           

auscultation of heart                   Overall:                   no murmurs   

                          2011                   None                

 

                    Full Exam - General                   Cardiovascular        

           

auscultation of heart                   Overall:                   regular rate 

                          2011                   None                

 

                    Full Exam - General                   Cardiovascular        

           

auscultation of heart                   Overall:                   normal heart 

sounds                    2011                   None                

 

                    Full Exam - General                   Cardiovascular        

           

auscultation of heart                   S1:                       a normal exam 

    

                          2011                   None                

 

                    Full Exam - General                   Cardiovascular        

           

auscultation of heart                   S2:                       a normal exam 

    

                          2011                   None                

 

                    Full Exam - General                   Cardiovascular        

           

auscultation of heart                   Rhythm:                   regular rhythm

                          2011                   None                

 

                    Full Exam - General                   Cardiovascular        

           

auscultation of heart                   Rate:                     regular rate  

  

                          2011                   None                

 

                    Full Exam - General                   Cardiovascular        

           

auscultation of heart                   S3 (ventricular gallop):                

                    present                   2011                   None 

               

 

                    Full Exam - General                   Abdomen               

    abdominal exam  

                    Overall:                   no masses                   

2011                              None                

 

                    Full Exam - General                   Abdomen               

    abdominal exam  

                          Overall:                   normal bowel sounds        

          

                          2011                   None                

 

                    Full Exam - General                   Abdomen               

    abdominal exam  

                    Overall:                   soft                   2011

    

                                        None                

 

                    Full Exam - General                   Abdomen               

    abdominal exam  

                          Epigastric:                   tender to palpation     

          

                          2011                   None                

 

                    Full Exam - General                   Constitutional        

            general 

appearance                   Overall:                   in no acute distress    

                          2011                   None                

 

                    Full Exam - General                   Constitutional        

            general 

appearance                   Overall:                   well developed          

                          2011                   None                

 

                    Full Exam - General                   Constitutional        

            general 

appearance                   Overall:                   well nourished          

                          2011                   None                

 

                    Full Exam - General                   Neurologic            

       mental status

                    Overall:                   alert                   

1 

                                        None                

 

                    Full Exam - General                   Neurologic            

       mental status

                    Overall:                   oriented                   

2011                              None                

 

                    Full Exam - General                   Psychiatric           

        mood and 

affect                    Overall:                   normal mood and affect     

 

                          2011                   None                

 

                    Full Exam - General                   Abdomen               

    abdominal exam  

                    Overall:                   no masses                   

2011                              None                

 

                    Full Exam - General                   Abdomen               

    abdominal exam  

                    Overall:                   no tenderness                   

2011                              None                

 

                    Full Exam - General                   Abdomen               

    abdominal exam  

                          Overall:                   normal bowel sounds        

          

                          2011                   None                

 

                    Full Exam - General                   Abdomen               

    abdominal exam  

                    Overall:                   soft                   2011

    

                                        None                

 

                    Full Exam - General                   Respiratory           

        auscultation

                          Right upper lung field:                   a normal exa

m       

                          2011                   None                

 

                    Full Exam - General                   Respiratory           

        auscultation

                          Right middle lung field:                   a normal ex

am      

                          2011                   None                

 

                    Full Exam - General                   Respiratory           

        auscultation

                          Right lower lung field:                   a normal exa

m       

                          2011                   None                

 

                    Full Exam - General                   Respiratory           

        auscultation

                          Left lower lung field:                   a normal exam

        

                          2011                   None                

 

                    Full Exam - General                   Respiratory           

        auscultation

                          Overall:                   breath sounds clear bilater

ally    

                          2011                   None                

 

                    Full Exam - General                   Respiratory           

        auscultation

                          Left upper lung field:                   a normal exam

        

                          2011                   None                

 

                    Full Exam - General                   Respiratory           

        auscultation

                    Diffuse:                   a normal exam                   

2011                              None                

 

                    Full Exam - General                   Cardiovascular        

           

auscultation of heart                   Overall:                   no murmurs   

                          2011                   None                

 

                    Full Exam - General                   Cardiovascular        

           

auscultation of heart                   Overall:                   regular rate 

                          2011                   None                

 

                    Full Exam - General                   Cardiovascular        

           

auscultation of heart                   Overall:                   normal heart 

sounds                    2011                   None                

 

                    Full Exam - General                   Cardiovascular        

           

auscultation of heart                   S1:                       a normal exam 

    

                          2011                   None                

 

                    Full Exam - General                   Cardiovascular        

           

auscultation of heart                   S2:                       a normal exam 

    

                          2011                   None                

 

                    Full Exam - General                   Cardiovascular        

           

auscultation of heart                   Rhythm:                   regular rhythm

                          2011                   None                

 

                    Full Exam - General                   Cardiovascular        

           

auscultation of heart                   Rate:                     regular rate  

  

                          2011                   None                

 

                    Full Exam - General                   Cardiovascular        

           

auscultation of heart                   S3 (ventricular gallop):                

                    present                   2011                   None 

               

 

                    Full Exam - General                   Cardiovascular        

           

auscultation of heart                   Murmur:                   previously 

known murmur unchanged                   2011                   None      

         

 

                    Full Exam - General                   Cardiovascular        

           

extremities                   Overall:                   no clubbing            

                          2011                   None                

 

                    Full Exam - General                   Cardiovascular        

           

extremities                   Overall:                   No edema               

                          2011                   None                

 

                    Full Exam - General                   Cardiovascular        

           

extremities                   Overall:                   No cyanosis            

                          2011                   None                

 

                    Full Exam - General                   Cardiovascular        

           

auscultation of heart                   Overall:                   no murmurs   

                          2011                   None                

 

                    Full Exam - General                   Cardiovascular        

           

auscultation of heart                   Rate:                     regular rate  

  

                          2011                   None                

 

                    Full Exam - General                   Cardiovascular        

           

auscultation of heart                   Rhythm:                   regular rhythm

                          2011                   None                

 

                    Full Exam - General                   Cardiovascular        

           

auscultation of heart                   S1:                       a normal exam 

    

                          2011                   None                

 

                    Full Exam - General                   Cardiovascular        

           

auscultation of heart                   S2:                       a normal exam 

    

                          2011                   None                

 

                    Full Exam - General                   Cardiovascular        

           

auscultation of heart                   S3 (ventricular gallop):                

                    present                   2011                   None 

               

 

                    Full Exam - General                   Ears/Nose/Throat      

             

otoscopic exam                   Overall:                   external auditory 

canals clear                   2011                   None                

 

                    Full Exam - General                   Ears/Nose/Throat      

             

internal nose                   Turbinates:                   hypertrophy       

                          2011                   None                

 

                    Full Exam - General                   Ears/Nose/Throat      

             

internal nose                   Turbinates:                   erythema          

                          2011                   None                

 

                    Full Exam - General                   Ears/Nose/Throat      

             oral 

cavity/pharynx/larynx                    Overall:                   oral mucosa 

clear                     2011                   None                

 

                    Full Exam - General                   Constitutional        

            general 

appearance                   Overall:                   well nourished          

                          2011                   None                

 

                    Full Exam - General                   Constitutional        

            general 

appearance                   Overall:                   well developed          

                          2011                   None                

 

                    Full Exam - General                   Constitutional        

            general 

appearance                   Overall:                   in no acute distress    

                          2011                   None                

 

                    Full Exam - General                   Neurologic            

       mental status

                    Overall:                   alert                   

1 

                                        None                

 

                    Full Exam - General                   Neurologic            

       mental status

                    Overall:                   oriented                   

2011                              None                

 

                    Full Exam - General                   Psychiatric           

        mood and 

affect                    Overall:                   normal mood and affect     

 

                          2011                   None                

 

                    Full Exam - General                   Respiratory           

        auscultation

                          Overall:                   breath sounds clear bilater

ally    

                          2011                   None                

 

                    Full Exam - General                   Respiratory           

        auscultation

                    Diffuse:                   a normal exam                   

2011                              None                

 

                    Full Exam - General                   Respiratory           

        auscultation

                          Left upper lung field:                   a normal exam

        

                          2011                   None                

 

                    Full Exam - General                   Respiratory           

        auscultation

                          Left lower lung field:                   a normal exam

        

                          2011                   None                

 

                    Full Exam - General                   Respiratory           

        auscultation

                          Right upper lung field:                   a normal exa

m       

                          2011                   None                

 

                    Full Exam - General                   Respiratory           

        auscultation

                          Right middle lung field:                   a normal ex

am      

                          2011                   None                

 

                    Full Exam - General                   Respiratory           

        auscultation

                          Right lower lung field:                   a normal exa

m       

                          2011                   None                

 

                    Full Exam - General                   Cardiovascular        

           

auscultation of heart                   Overall:                   regular rate 

                          2011                   None                

 

                    Full Exam - General                   Cardiovascular        

           

auscultation of heart                   Overall:                   normal heart 

sounds                    2011                   None                

 

                    Full Exam - General                   Constitutional        

            general 

appearance                   Overall:                   well nourished          

                          2011                   None                

 

                    Full Exam - General                   Constitutional        

            general 

appearance                   Overall:                   well developed          

                          2011                   None                

 

                    Full Exam - General                   Constitutional        

            general 

appearance                   Overall:                   in no acute distress    

                          2011                   None                

 

                    Full Exam - General                   Neurologic            

       mental status

                    Overall:                   alert                   

1 

                                        None                

 

                    Full Exam - General                   Neurologic            

       mental status

                    Overall:                   oriented                   

2011                              None                

 

                    Full Exam - General                   Psychiatric           

        mood and 

affect                    Overall:                   normal mood and affect     

 

                          2011                   None                

 

                    Full Exam - General                   Musculoskeletal       

            spine, 

ribs and pelvis                   Spine:                    tender @ lumbar spin

e

                          2011                   None                

 

                    Full Exam - General                   Musculoskeletal       

            spine, 

ribs and pelvis                   Sacroiliac joints:                   tender 

right sacroiliac joint                   2011                   None      

         

 

                    Full Exam - General                   Musculoskeletal       

            spine, 

ribs and pelvis                   Sacroiliac joints:                   tender 

right sacroiliac joint                   2011                   None      

         

 

                    Full Exam - General                   Musculoskeletal       

            spine, 

ribs and pelvis                   Sacroiliac joints:                   tender 

left sacroiliac joint                   2011                   None       

        

 

                    Full Exam - General                   Respiratory           

        auscultation

                          Overall:                   breath sounds clear bilater

ally    

                          2011                   None                

 

                    Full Exam - General                   Respiratory           

        auscultation

                    Diffuse:                   a normal exam                   

2011                              None                

 

                    Full Exam - General                   Respiratory           

        auscultation

                          Left upper lung field:                   a normal exam

        

                          2011                   None                

 

                    Full Exam - General                   Respiratory           

        auscultation

                          Left lower lung field:                   a normal exam

        

                          2011                   None                

 

                    Full Exam - General                   Respiratory           

        auscultation

                          Right upper lung field:                   a normal exa

m       

                          2011                   None                

 

                    Full Exam - General                   Respiratory           

        auscultation

                          Right middle lung field:                   a normal ex

am      

                          2011                   None                

 

                    Full Exam - General                   Respiratory           

        auscultation

                          Right lower lung field:                   a normal exa

m       

                          2011                   None                

 

                    Full Exam - General                   Cardiovascular        

           

auscultation of heart                   Overall:                   regular rate 

                          2011                   None                

 

                    Full Exam - General                   Cardiovascular        

           

auscultation of heart                   Overall:                   normal heart 

sounds                    2011                   None                

 

                    Full Exam - General                   Cardiovascular        

           

auscultation of heart                   Overall:                   no murmurs   

                          2011                   None                

 

                    Full Exam - General                   Cardiovascular        

           

auscultation of heart                   Rate:                     regular rate  

  

                          2011                   None                

 

                    Full Exam - General                   Cardiovascular        

           

auscultation of heart                   Rhythm:                   regular rhythm

                          2011                   None                

 

                    Full Exam - General                   Constitutional        

            general 

appearance                   Overall:                   well nourished          

                          2011                   None                

 

                    Full Exam - General                   Constitutional        

            general 

appearance                   Overall:                   well developed          

                          2011                   None                

 

                    Full Exam - General                   Constitutional        

            general 

appearance                   Overall:                   in no acute distress    

                          2011                   None                

 

                    Full Exam - General                   Neurologic            

       mental status

                    Overall:                   alert                   

1 

                                        None                

 

                    Full Exam - General                   Neurologic            

       mental status

                    Overall:                   oriented                   

2011                              None                

 

                    Full Exam - General                   Psychiatric           

        mood and 

affect                    Overall:                   normal mood and affect     

 

                          2011                   None                

 

                    Full Exam - General                   Musculoskeletal       

            spine, 

ribs and pelvis                   Spine:                    tender @ lumbar spin

e

                          2011                   None                

 

                    Full Exam - General                   Cardiovascular        

           

auscultation of heart                   S1:                       a normal exam 

    

                          2011                   None                

 

                    Full Exam - General                   Cardiovascular        

           

auscultation of heart                   S2:                       a normal exam 

    

                          2011                   None                

 

                    Full Exam - General                   Cardiovascular        

           

auscultation of heart                   S3 (ventricular gallop):                

                    present                   2011                   None 

               

 

                    Full Exam - General                   Eyes                  

 conjunctiva/eyelids

                    Overall:                   cornea clear                   

2010                              None                

 

                    Full Exam - General                   Eyes                  

 conjunctiva/eyelids

                    Overall:                   eyelids normal                   

2010                              None                

 

                    Full Exam - General                   Eyes                  

 pupils and irises  

                          Overall:                   pupils equal, round, reacti

ve to 

light and accomodation                   2010                   None      

         

 

                    Full Exam - General                   Ears/Nose/Throat      

             

external ear                   Overall:                   normal appearance     

                          2010                   None                

 

                    Full Exam - General                   Ears/Nose/Throat      

             

otoscopic exam                          Left external auditory canal:           

       

                    complete cerumen impaction                   2010     

              None  

             

 

                    Full Exam - General                   Ears/Nose/Throat      

             

otoscopic exam                          Right external auditory canal:          

       

                    complete cerumen impaction                   2010     

              None 

              

 

                    Full Exam - General                   Ears/Nose/Throat      

             

otoscopic exam                   Left tympanic membrane:                   not 

visualized                   2010                   None                

 

                    Full Exam - General                   Ears/Nose/Throat      

             

otoscopic exam                   Right tympanic membrane:                   not 

visualized                   2010                   None                

 

                    Full Exam - General                   Ears/Nose/Throat      

             

lips/teeth/gingiva                   Overall:                   benign lips     

                          2010                   None                

 

                    Full Exam - General                   Ears/Nose/Throat      

             

lips/teeth/gingiva                   Overall:                   benign gingiva  

                          2010                   None                

 

                    Full Exam - General                   Ears/Nose/Throat      

             

lips/teeth/gingiva                   Overall:                   normal dentition

                          2010                   None                

 

                    Full Exam - General                   Ears/Nose/Throat      

             oral 

cavity/pharynx/larynx                    Overall:                   tonsils 

benign                    2010                   None                

 

                    Full Exam - General                   Ears/Nose/Throat      

             oral 

cavity/pharynx/larynx                    Oropharynx:                   erythema 

                          2010                   None                

 

                    Full Exam - General                   Respiratory           

        auscultation

                          Overall:                   breath sounds clear bilater

ally    

                          2010                   None                

 

                    Full Exam - General                   Respiratory           

        respiratory 

effort/rhythm                   Overall:                   no retractions       

                          2010                   None                

 

                    Full Exam - General                   Respiratory           

        respiratory 

effort/rhythm                   Overall:                   normal rate          

                          2010                   occasionally coughs durin

g the exam        

       

 

                    Full Exam - General                   Cardiovascular        

           

auscultation of heart                   Overall:                   regular rate 

                          2010                   None                

 

                    Full Exam - General                   Cardiovascular        

           

auscultation of heart                   Overall:                   normal heart 

sounds                    2010                   None                

 

                    Full Exam - General                   Psychiatric           

        

orientation/consciousness                   Overall:                   oriented 

to person, place and time                   2010                   None   

            

 

                    Full Exam - General                   Constitutional        

            general 

appearance                   Overall:                   well nourished          

                          2010                   None                

 

                    Full Exam - General                   Constitutional        

            general 

appearance                   Overall:                   well developed          

                          2010                   None                

 

                    Full Exam - General                   Constitutional        

            general 

appearance                   Overall:                   in no acute distress    

                          2010                   None                

 

                    Full Exam - General                   Neurologic            

       mental status

                    Overall:                   alert                   

0 

                                        None                

 

                    Full Exam - General                   Cardiovascular        

           

auscultation of heart                   Rate:                     regular rate  

  

                          2010                   None                

 

                    Full Exam - General                   Cardiovascular        

           

auscultation of heart                   Rhythm:                   regular rhythm

                          2010                   None                

 

                    Full Exam - General                   Respiratory           

        auscultation

                          Right middle lung field:                   a normal ex

am      

                          2010                   None                

 

                    Full Exam - General                   Respiratory           

        auscultation

                          Right lower lung field:                   a normal exa

m       

                          2010                   None                

 

                    Full Exam - General                   Cardiovascular        

           

auscultation of heart                   Overall:                   regular rate 

                          2010                   None                

 

                    Full Exam - General                   Cardiovascular        

           

auscultation of heart                   Overall:                   normal heart 

sounds                    2010                   None                

 

                    Full Exam - General                   Cardiovascular        

           

auscultation of heart                   Overall:                   no murmurs   

                          2010                   None                

 

                    Full Exam - General                   Cardiovascular        

           

auscultation of heart                   S1:                       a normal exam 

    

                          2010                   None                

 

                    Full Exam - General                   Cardiovascular        

           

auscultation of heart                   S2:                       a normal exam 

    

                          2010                   None                

 

                    Full Exam - General                   Cardiovascular        

           

auscultation of heart                   S3 (ventricular gallop):                

                    present                   2010                   None 

               

 

                    Full Exam - General                   Abdomen               

    abdominal exam  

                    Overall:                   no tenderness                   

2010                              None                

 

                    Full Exam - General                   Neurologic            

       mental status

                    Overall:                   oriented                   

2010                              None                

 

                    Full Exam - General                   Psychiatric           

        mood and 

affect                    Overall:                   normal mood and affect     

 

                          2010                   None                

 

                    Full Exam - General                   Abdomen               

    abdominal exam  

                    Overall:                   no masses                   

2010                              None                

 

                    Full Exam - General                   Abdomen               

    abdominal exam  

                          Overall:                   normal bowel sounds        

          

                          2010                   None                

 

                    Full Exam - General                   Abdomen               

    abdominal exam  

                    Overall:                   soft                   2010

    

                                        None                

 

                    Full Exam - General                   Respiratory           

        auscultation

                          Overall:                   breath sounds clear bilater

ally    

                          2010                   None                

 

                    Full Exam - General                   Respiratory           

        auscultation

                    Diffuse:                   a normal exam                   

2010                              None                

 

                    Full Exam - General                   Respiratory           

        auscultation

                          Left upper lung field:                   a normal exam

        

                          2010                   None                

 

                    Full Exam - General                   Respiratory           

        auscultation

                          Left lower lung field:                   a normal exam

        

                          2010                   None                

 

                    Full Exam - General                   Respiratory           

        auscultation

                          Right upper lung field:                   a normal exa

m       

                          2010                   None                

 

                    Full Exam - General                   Neurologic            

       mental status

                    Overall:                   oriented                   

2010                              None                

 

                    Full Exam - General                   Psychiatric           

        mood and 

affect                   Mood:                   depressed                   

2010                              tearful                

 

                    Full Exam - General                   Neurologic            

       mental status

                    Overall:                   alert                   

0 

                                        None                

 

                    Full Exam - General                   Cardiovascular        

           

auscultation of heart                   Rate:                     regular rate  

  

                          2010                   None                

 

                    Full Exam - General                   Cardiovascular        

           

auscultation of heart                   Rhythm:                   regular rhythm

                          2010                   None                

 

                    Full Exam - General                   Cardiovascular        

           

auscultation of heart                   S1:                       a normal exam 

    

                          2010                   None                

 

                    Full Exam - General                   Cardiovascular        

           

auscultation of heart                   S2:                       a normal exam 

    

                          2010                   None                

 

                    Full Exam - General                   Cardiovascular        

           

auscultation of heart                   S3 (ventricular gallop):                

                    present                   2010                   None 

               

 

                    Full Exam - General                   Cardiovascular        

           

extremities                   Overall:                   no clubbing            

                          2010                   None                

 

                    Full Exam - General                   Cardiovascular        

           

extremities                   Overall:                   No edema               

                          2010                   None                

 

                    Full Exam - General                   Cardiovascular        

           

extremities                   Overall:                   No cyanosis            

                          2010                   None                

 

                    Full Exam - General                   Psychiatric           

        mood and 

affect                   Mood:                   anxious                   

2010                              ringing hands                

 

                    Full Exam - General                   Psychiatric           

        mood and 

affect                    Affect:                   constricted                 

 

                          2010                   None                

 

                    Full Exam - General                   Respiratory           

        auscultation

                          Overall:                   breath sounds clear bilater

ally    

                          2010                   None                

 

                    Full Exam - General                   Respiratory           

        auscultation

                    Diffuse:                   a normal exam                   

2010                              None                

 

                    Full Exam - General                   Respiratory           

        auscultation

                          Left upper lung field:                   a normal exam

        

                          2010                   None                

 

                    Full Exam - General                   Respiratory           

        auscultation

                          Left lower lung field:                   a normal exam

        

                          2010                   None                

 

                    Full Exam - General                   Respiratory           

        auscultation

                          Right upper lung field:                   a normal exa

m       

                          2010                   None                

 

                    Full Exam - General                   Respiratory           

        auscultation

                          Right middle lung field:                   a normal ex

am      

                          2010                   None                

 

                    Full Exam - General                   Respiratory           

        auscultation

                          Right lower lung field:                   a normal exa

m       

                          2010                   None                

 

                    Full Exam - General                   Cardiovascular        

           

auscultation of heart                   Overall:                   regular rate 

                          2010                   None                

 

                    Full Exam - General                   Cardiovascular        

           

auscultation of heart                   Overall:                   normal heart 

sounds                    2010                   None                

 

                    Full Exam - General                   Cardiovascular        

           

auscultation of heart                   Overall:                   no murmurs   

                          2010                   None                

 

                    Full Exam - General                   Constitutional        

            general 

appearance                   Overall:                   well nourished          

                          2010                   None                

 

                    Full Exam - General                   Constitutional        

            general 

appearance                   Overall:                   well developed          

                          2010                   None                

 

                    Full Exam - General                   Constitutional        

            general 

appearance                   Overall:                   in no acute distress    

                          2010                   None                

 

                    Full Exam - General                   Psychiatric           

        mood and 

affect                    Overall:                   normal mood and affect     

 

                          2010                   None                

 

                    Full Exam - General                   Respiratory           

        auscultation

                          Right middle lung field:                   a normal ex

am      

                          2010                   None                

 

                    Full Exam - General                   Abdomen               

    abdominal exam  

                    Overall:                   no masses                   

2010                              None                

 

                    Full Exam - General                   Abdomen               

    abdominal exam  

                          Overall:                   normal bowel sounds        

          

                          2010                   None                

 

                    Full Exam - General                   Constitutional        

            general 

appearance                   Overall:                   well nourished          

                          2010                   None                

 

                    Full Exam - General                   Constitutional        

            general 

appearance                   Overall:                   well developed          

                          2010                   None                

 

                    Full Exam - General                   Constitutional        

            general 

appearance                   Overall:                   in no acute distress    

                          2010                   None                

 

                    Full Exam - General                   Respiratory           

        auscultation

                          Overall:                   breath sounds clear bilater

ally    

                          2010                   None                

 

                    Full Exam - General                   Respiratory           

        auscultation

                    Diffuse:                   a normal exam                   

2010                              None                

 

                    Full Exam - General                   Respiratory           

        auscultation

                          Left upper lung field:                   a normal exam

        

                          2010                   None                

 

                    Full Exam - General                   Respiratory           

        auscultation

                          Left lower lung field:                   a normal exam

        

                          2010                   None                

 

                    Full Exam - General                   Respiratory           

        auscultation

                          Right upper lung field:                   a normal exa

m       

                          2010                   None                

 

                    Full Exam - General                   Respiratory           

        auscultation

                          Right lower lung field:                   a normal exa

m       

                          2010                   None                

 

                    Full Exam - General                   Cardiovascular        

           

auscultation of heart                   Overall:                   regular rate 

                          2010                   None                

 

                    Full Exam - General                   Cardiovascular        

           

auscultation of heart                   Overall:                   normal heart 

sounds                    2010                   None                

 

                    Full Exam - General                   Cardiovascular        

           

auscultation of heart                   Overall:                   no murmurs   

                          2010                   None                

 

                    Full Exam - General                   Cardiovascular        

           

auscultation of heart                   Rate:                     regular rate  

  

                          2010                   None                

 

                    Full Exam - General                   Cardiovascular        

           

auscultation of heart                   Rhythm:                   regular rhythm

                          2010                   None                

 

                    Full Exam - General                   Cardiovascular        

           

auscultation of heart                   S1:                       a normal exam 

    

                          2010                   None                

 

                    Full Exam - General                   Abdomen               

    abdominal exam  

                    Overall:                   soft                   2010

    

                                        None                

 

                    Full Exam - General                   Abdomen               

    abdominal exam  

                          Epigastric:                   tender to palpation     

          

                          2010                   None                

 

                    Full Exam - General                   Neurologic            

       mental status

                    Overall:                   alert                   

0 

                                        None                

 

                    Full Exam - General                   Neurologic            

       mental status

                    Overall:                   oriented                   

2010                              None                

 

                    Full Exam - General                   Cardiovascular        

           

auscultation of heart                   S2:                       a normal exam 

    

                          2010                   None                

 

                    Full Exam - General                   Cardiovascular        

           

extremities                   Overall:                   no clubbing            

                          2010                   None                

 

                    Full Exam - General                   Cardiovascular        

           

extremities                   Overall:                   No edema               

                          2010                   None                

 

                    Full Exam - General                   Cardiovascular        

           

extremities                   Overall:                   No cyanosis            

                          2010                   None                

 

                    Full Exam - General                   Constitutional        

            general 

appearance                   Overall:                   well nourished          

                          2010                   None                

 

                    Full Exam - General                   Constitutional        

            general 

appearance                   Overall:                   well developed          

                          2010                   None                

 

                    Full Exam - General                   Constitutional        

            general 

appearance                   Overall:                   in no acute distress    

                          2010                   None                

 

                    Full Exam - General                   Respiratory           

        auscultation

                          Overall:                   breath sounds clear bilater

ally    

                          2010                   None                

 

                    Full Exam - General                   Respiratory           

        auscultation

                    Diffuse:                   a normal exam                   

2010                              None                

 

                    Full Exam - General                   Respiratory           

        auscultation

                          Left upper lung field:                   a normal exam

        

                          2010                   None                

 

                    Full Exam - General                   Respiratory           

        auscultation

                          Left lower lung field:                   a normal exam

        

                          2010                   None                

 

                    Full Exam - General                   Respiratory           

        auscultation

                          Right upper lung field:                   a normal exa

m       

                          2010                   None                

 

                    Full Exam - General                   Respiratory           

        auscultation

                          Right middle lung field:                   a normal ex

am      

                          2010                   None                

 

                    Full Exam - General                   Respiratory           

        auscultation

                          Right lower lung field:                   a normal exa

m       

                          2010                   None                

 

                    Full Exam - General                   Cardiovascular        

           

auscultation of heart                   Overall:                   regular rate 

                          2010                   None                

 

                    Full Exam - General                   Cardiovascular        

           

auscultation of heart                   Overall:                   normal heart 

sounds                    2010                   None                

 

                    Full Exam - General                   Cardiovascular        

           

auscultation of heart                   Murmur:                   previously 

known murmur unchanged                   2010                   None      

         

 

                    Full Exam - General                   Cardiovascular        

           

extremities                   Overall:                   no clubbing            

                          2010                   None                

 

                    Full Exam - General                   Cardiovascular        

           

extremities                   Overall:                   No cyanosis            

                          2010                   None                

 

                    Full Exam - General                   Cardiovascular        

           

extremities                   Edema present:                   severity 1+ - 4+:

trace                     2010                   None                

 

                    Full Exam - General                   Abdomen               

    abdominal exam  

                    Overall:                   no masses                   

2010                              None                

 

                    Full Exam - General                   Abdomen               

    abdominal exam  

                    Overall:                   no tenderness                   

2010                              None                

 

                    Full Exam - General                   Abdomen               

    abdominal exam  

                          Overall:                   normal bowel sounds        

          

                          2010                   None                

 

                    Full Exam - General                   Abdomen               

    abdominal exam  

                    Overall:                   soft                   2010

    

                                        None                

 

                    Full Exam - General                   Neurologic            

       mental status

                    Overall:                   alert                   

0 

                                        None                

 

                    Full Exam - General                   Neurologic            

       mental status

                    Overall:                   oriented                   

2010                              None                

 

                    Full Exam - General                   Psychiatric           

        mood and 

affect                   Affect:                   flat                   

2010                              None                



                                                                              



Procedures

                      



                    Procedure                   Codes                   Date    

            

 

                                              ROUTINE VENIPUNCTURE              

                       

CPT-4: 30275                            06/10/2019                

 

                                                            ASSAY THYROID STIM H

ORMONE                                  

                          CPT-4: 42146                   06/10/2019             

   

 

                                                            COMPREHEN METABOLIC 

PANEL                                   

                          CPT-4: 85816                   06/10/2019             

   

 

                                                            COMPLETE CBC W/AUTO 

DIFF WBC                                

                          CPT-4: 56372                   06/10/2019             

   

 

                                                            URINALYSIS NONAUTO W

/O SCOPE                                

                          CPT-4: 76821                   2019             

   

 

                                                            URINE CULTURE/ COLON

Y COUNT                                 

                          CPT-4: 96897                   2019             

   

 

                                                            URINE CULTURE/ COLON

Y COUNT                                 

                          CPT-4: 24588                   10/02/2018             

   

 

                                              ROUTINE VENIPUNCTURE              

                       

CPT-4: 77591                            2018                

 

                                              ASSAY OF FREE THYROXINE           

                          

CPT-4: 01973                            2018                

 

                                                            ASSAY THYROID STIM H

ORMONE                                  

                          CPT-4: 72770                   2018             

   

 

                                                            COMPLETE CBC W/AUTO 

DIFF WBC                                

                          CPT-4: 22915                   2018             

   

 

                                                            METABOLIC PANEL TOTA

L CA                                    

                          CPT-4: 29889                   2018             

   

 

                                                            FLU VACC PRSV FREE I

NC ANTIG 64 AND OLDER                   

                          CPT-4: 74170                   2018             

   

 

                                                            ASSAY, GLUCOSE, BLOO

D QUANT                                 

                          CPT-4: 88582                   2018             

   

 

                                                            ADMIN INFLUENZA VIRU

S VAC                                   

                          CPT-4:                    2018             

   

 

                                              ROUTINE VENIPUNCTURE              

                       

CPT-4: 37707                            2018                

 

                                                            COMPREHEN METABOLIC 

PANEL                                   

                          CPT-4: 39738                   2018             

   

 

                                                            COMPLETE CBC W/AUTO 

DIFF WBC                                

                          CPT-4: 97858                   2018             

   

 

                                              A1C HPLC                          

           CPT-4: 94470   

                                        2018                

 

                                              ASSAY OF TROPONIN QUANT           

                          

CPT-4: 91675                            2018                

 

                                              LIPID PANEL                       

              CPT-4: 24988

                                        2018                

 

                                                            THER/PROPH/DIAG INJ 

SC/IM                                   

                          CPT-4: 96888                   2018             

   

 

                                                            TRIAMCINOLONE ACET I

[place] NOS                                  

                          CPT-4:                    2018             

   

 

                                                            URINALYSIS NONAUTO W

/O SCOPE                                

                          CPT-4: 54413                   2018             

   

 

                                                            URINE CULTURE/ COLON

Y COUNT                                 

                          CPT-4: 97551                   2018             

   

 

                                                            FLU VACC PRSV FREE I

NC ANTIG 64 AND OLDER                   

                          CPT-4: 87754                   10/06/2017             

   

 

                                                            ADMIN INFLUENZA VIRU

S VAC                                   

                          CPT-4:                    10/06/2017             

   

 

                                              ROUTINE VENIPUNCTURE              

                       

CPT-4: 52549                            2017                

 

                                                            COMPREHEN METABOLIC 

PANEL                                   

                          CPT-4: 06808                   2017             

   

 

                                                            COMPLETE CBC W/AUTO 

DIFF WBC                                

                          CPT-4: 91715                   2017             

   

 

                                              LIPID PANEL                       

              CPT-4: 02186

                                        2017                

 

                                              A1C HPLC                          

           CPT-4: 99924   

                                        2017                

 

                                                            ASSAY THYROID STIM H

ORMONE                                  

                          CPT-4: 03787                   2017             

   

 

                                              ROUTINE VENIPUNCTURE              

                       

CPT-4: 76328                            2017                

 

                                                            ASSAY THYROID STIM H

ORMONE                                  

                          CPT-4: 38491                   2017             

   

 

                                                            COMPREHEN METABOLIC 

PANEL                                   

                          CPT-4: 48726                   2017             

   

 

                                                            COMPLETE CBC W/AUTO 

DIFF WBC                                

                          CPT-4: 70592                   2017             

   

 

                                              A1C HPLC                          

           CPT-4: 58136   

                                        2017                

 

                                                            FLU VACC PRSV FREE I

NC ANTIG 64 AND OLDER                   

                          CPT-4: 74905                   10/07/2016             

   

 

                                                            ADMIN INFLUENZA VIRU

S VAC                                   

                          CPT-4:                    10/07/2016             

   

 

                                              ROUTINE VENIPUNCTURE              

                       

CPT-4: 99992                            2016                

 

                                              ASSAY OF FREE THYROXINE           

                          

CPT-4: 03961                            2016                

 

                                                            ASSAY THYROID STIM H

ORMONE                                  

                          CPT-4: 26649                   2016             

   

 

                                                            COMPREHEN METABOLIC 

PANEL                                   

                          CPT-4: 32009                   2016             

   

 

                                                            COMPLETE CBC W/AUTO 

DIFF WBC                                

                          CPT-4: 38346                   2016             

   

 

                                              LIPID PANEL                       

              CPT-4: 34102

                                        2016                

 

                                              A1C HPLC                          

           CPT-4: 18799   

                                        2016                

 

                                                            URINALYSIS NONAUTO W

/O SCOPE                                

                          CPT-4: 27571                   2016             

   

 

                                              ROUTINE VENIPUNCTURE              

                       

CPT-4: 80170                            2015                

 

                                                            METABOLIC PANEL TOTA

L CA                                    

                          CPT-4: 49313                   2015             

   

 

                                                            PRESCRIP TRANSMIT VI

A ERX SY                                

                          CPT-4:                    2015             

   

 

                                                            FLU VACC PRSV FREE I

NC ANTIG 64 AND OLDER                   

                          CPT-4: 98900                   10/16/2015             

   

 

                                                            ADMIN INFLUENZA VIRU

S VAC                                   

                          CPT-4:                    10/16/2015             

   

 

                                                            URINALYSIS NONAUTO W

/O SCOPE                                

                          CPT-4: 18805                   09/15/2015             

   

 

                                                            URINE CULTURE/ COLON

Y COUNT                                 

                          CPT-4: 98407                   09/15/2015             

   

 

                                              ROUTINE VENIPUNCTURE              

                       

CPT-4: 55565                            09/10/2015                

 

                                              ASSAY OF FREE THYROXINE           

                          

CPT-4: 21629                            09/10/2015                

 

                                                            ASSAY THYROID STIM H

ORMONE                                  

                          CPT-4: 57376                   09/10/2015             

   

 

                                                            COMPREHEN METABOLIC 

PANEL                                   

                          CPT-4: 12062                   09/10/2015             

   

 

                                                            COMPLETE CBC W/AUTO 

DIFF WBC                                

                          CPT-4: 26842                   09/10/2015             

   

 

                                              LIPID PANEL                       

              CPT-4: 62709

                                        09/10/2015                

 

                                              A1C HPLC                          

           CPT-4: 61125   

                                        09/10/2015                

 

                                              CERUM REMOVAL                     

                CPT-4: 

39827                                   2015                

 

                                                            PRESCRIP TRANSMIT VI

A ERX SY                                

                          CPT-4:                    2015             

   

 

                                              FLUZONE, 5ML (Medicare)           

                          

CPT-4:                             10/17/2014                

 

                                                            ADMIN INFLUENZA VIRU

S VAC                                   

                          CPT-4:                    10/17/2014             

   

 

                                                            PRESCRIP TRANSMIT VI

A ERX SY                                

                          CPT-4:                    2014             

   

 

                                                            PRESCRIP TRANSMIT VI

A ERX SY                                

                          CPT-4:                    2014             

   

 

                                                            PRESCRIP TRANSMIT VI

A ERX SY                                

                          CPT-4:                    2014             

   

 

                                              ROUTINE VENIPUNCTURE              

                       

CPT-4: 61959                            2014                

 

                                              ASSAY OF FREE THYROXINE           

                          

CPT-4: 18253                            2014                

 

                                                            ASSAY THYROID STIM H

ORMONE                                  

                          CPT-4: 89203                   2014             

   

 

                                                            COMPREHEN METABOLIC 

PANEL                                   

                          CPT-4: 80626                   2014             

   

 

                                                            COMPLETE CBC W/AUTO 

DIFF WBC                                

                          CPT-4: 61307                   2014             

   

 

                                              LIPID PANEL                       

              CPT-4: 82826

                                        2014                

 

                                              A1C HPLC                          

           CPT-4: 51306   

                                        2014                

 

                                              VITAMIN B 12 FOLIC ACID           

                          

CPT-4: 49054|23705                      2014                

 

                                                            PRESCRIP TRANSMIT VI

A ERX SY                                

                          CPT-4:                    2014             

   

 

                                                            PRESCRIP TRANSMIT VI

A ERX SY                                

                          CPT-4:                    10/15/2013             

   

 

                                              FLUZONE, 5ML (Medicare)           

                          

CPT-4:                             2013                

 

                                                            ADMIN INFLUENZA VIRU

S VAC                                   

                          CPT-4:                    2013             

   

 

                                                            PRESCRIP TRANSMIT VI

A ERX SY                                

                          CPT-4:                    2013             

   

 

                                                            PRESCRIP TRANSMIT VI

A ERX SY                                

                          CPT-4:                    05/10/2013             

   

 

                                              ROUTINE VENIPUNCTURE              

                       

CPT-4: 91066                            2012                

 

                                              ASSAY OF FREE THYROXINE           

                          

CPT-4: 14928                            2012                

 

                                                            ASSAY THYROID STIM H

ORMONE                                  

                          CPT-4: 17956                   2012             

   

 

                                                            COMPREHEN METABOLIC 

PANEL                                   

                          CPT-4: 56558                   2012             

   

 

                                                            COMPLETE CBC W/AUTO 

DIFF WBC                                

                          CPT-4: 25380                   2012             

   

 

                                              LIPID PANEL                       

              CPT-4: 44174

                                        2012                

 

                                                            A1C GLYCOSYLATED HEM

OGLOBIN TEST                            

                          CPT-4: 14165                   2012             

   

 

                                              CERUM REMOVAL                     

                CPT-4: 

25768                                   2012                

 

                                                            PRESCRIP TRANSMIT VI

A ERX SY                                

                          CPT-4:                    2012             

   

 

                                                            PRESCRIP TRANSMIT VI

A ERX SY                                

                          CPT-4:                    10/10/2012             

   

 

                                              FLUZONE, 5ML (Medicare)           

                          

CPT-4:                             2012                

 

                                                            ADMIN INFLUENZA VIRU

S VAC                                   

                          CPT-4:                    2012             

   

 

                                                            ASSAY, GLUCOSE, BLOO

D QUANT                                 

                          CPT-4: 22610                   2012             

   

 

                                                            URINALYSIS NONAUTO W

/O SCOPE                                

                          CPT-4: 95656                   2012             

   

 

                                                            URINE CULTURE/ COLON

Y COUNT                                 

                          CPT-4: 11922                   2012             

   

 

                                              ROUTINE VENIPUNCTURE              

                       

CPT-4: 79248                            2012                

 

                                              ASSAY OF FREE THYROXINE           

                          

CPT-4: 22766                            2012                

 

                                                            ASSAY THYROID STIM H

ORMONE                                  

                          CPT-4: 82170                   2012             

   

 

                                                            COMPREHEN METABOLIC 

PANEL                                   

                          CPT-4: 61191                   2012             

   

 

                                                            COMPLETE CBC W/AUTO 

DIFF WBC                                

                          CPT-4: 90797                   2012             

   

 

                                              LIPID PANEL                       

              CPT-4: 02633

                                        2012                

 

                                              ASSAY OF INSULIN                  

                   CPT-4: 

15715                                   2012                

 

                                                            A1C GLYCOSYLATED HEM

OGLOBIN TEST                            

                          CPT-4: 47052                   2012             

   

 

                                                            DRAIN/INJECT JOINT/B

URSA                                    

                          CPT-4: 98832                   2012             

   

 

                                                            METHYLPREDNISOLONE 4

0 MG INJ                                

                          CPT-4:                    2012             

   

 

                                                            TRIAMCINOLONE ACET I

N[place] NOS                                  

                          CPT-4:                    2012             

   

 

                                                            PRESCRIP TRANSMIT VI

A ERX SY                                

                          CPT-4:                    2012             

   

 

                                                            PRESCRIP TRANSMIT VI

A ERX SY                                

                          CPT-4:                    2012             

   

 

                                                            METHYLPREDNISOLONE 4

0 MG INJ                                

                          CPT-4:                    2012             

   

 

                                                            DRAIN/INJECT JOINT/B

URSA                                    

                          CPT-4: 42618                   2012             

   

 

                                                            TRIAMCINOLONE ACET I

N[place] NOS                                  

                          CPT-4:                    2012             

   

 

                                                            PRESCRIP TRANSMIT VI

A ERX SY                                

                          CPT-4:                    2012             

   

 

                                              ROUTINE VENIPUNCTURE              

                       

CPT-4: 07935                            2012                

 

                                              ASSAY OF FREE THYROXINE           

                          

CPT-4: 72069                            2012                

 

                                                            ASSAY THYROID STIM H

ORMONE                                  

                          CPT-4: 02325                   2012             

   

 

                                                            COMPREHEN METABOLIC 

PANEL                                   

                          CPT-4: 72025                   2012             

   

 

                                                            COMPLETE CBC W/AUTO 

DIFF WBC                                

                          CPT-4: 44578                   2012             

   

 

                                              LIPID PANEL                       

              CPT-4: 34624

                                        2012                

 

                                                            PRESCRIP TRANSMIT VI

A ERX SY                                

                          CPT-4:                    2012             

   

 

                                              CERUM REMOVAL                     

                CPT-4: 

12863                                   2011                

 

                                                            PRESCRIP TRANSMIT VI

A ERX SY                                

                          CPT-4:                    2011             

   

 

                                              FLUZONE, 5ML (Medicare)           

                          

CPT-4:                             10/05/2011                

 

                                                            ADMIN INFLUENZA VIRU

S VAC                                   

                          CPT-4:                    10/05/2011             

   

 

                                                            PRESCRIP TRANSMIT VI

A ERX SY                                

                          CPT-4:                    2011             

   

 

                                                            URINALYSIS NONAUTO W

/O SCOPE                                

                          CPT-4: 13712                   2011             

   

 

                                                            URINE CULTURE/ COLON

Y COUNT                                 

                          CPT-4: 68786                   2011             

   

 

                                              CUR TOBACCO NON-USER              

                       

CPT-4:                             2011                

 

                                              ROUTINE VENIPUNCTURE              

                       

CPT-4: 88276                            2011                

 

                                                            COMPLETE CBC W/AUTO 

DIFF WBC                                

                          CPT-4: 05474                   2011             

   

 

                                                            COMPREHEN METABOLIC 

PANEL                                   

                          CPT-4: 67349                   2011             

   

 

                                              LIPID PANEL                       

              CPT-4: 69119

                                        2011                

 

                                                            ASSAY THYROID STIM H

ORMONE                                  

                          CPT-4: 59416                   2011             

   

 

                                              ASSAY OF FREE THYROXINE           

                          

CPT-4: 03654                            2011                

 

                                                            PRESCRIP TRANSMIT VI

A ERX SY                                

                          CPT-4:                    2011             

   

 

                                                            INJ TRIGGER POINT 1/

2 MUSCL                                 

                          CPT-4: 95047                   2011             

   

 

                                                            TRIAMCINOLONE ACET I

N[place] NOS                                  

                          CPT-4:                    2011             

   

 

                                                            METHYLPREDNISOLONE 4

0 MG INJ                                

                          CPT-4:                    2011             

   

 

                                                            THER/PROPH/DIAG INJ 

SC/IM                                   

                          CPT-4: 53193                   2011             

   

 

                                                            KETOROLAC TROMETHAMI

NE INJ                                  

                          CPT-4:                    2011             

   

 

                                                            PRESCRIP TRANSMIT VI

A ERX SY                                

                          CPT-4:                    2010             

   

 

                                                            FLU VACCINE 3 YRS & 

> IM UP 63                              

                          CPT-4: 48288                   10/06/2010             

   

 

                                                            ADMIN INFLUENZA VIRU

S VAC                                   

                          CPT-4:                    10/06/2010             

   

 

                                                            URINALYSIS NONAUTO W

/O SCOPE                                

                          CPT-4: 10637                   2010             

   

 

                                                            URINE CULTURE/ COLON

Y COUNT                                 

                          CPT-4: 95050                   2010             

   

 

                                                            PRESCRIP TRANSMIT VI

A ERX SY                                

                          CPT-4:                    2010             

   

 

                                                            THER/PROPH/DIAG INJ 

SC/IM                                   

                          CPT-4: 94643                   2010             

   

 

                                              VITAMIN B12 INJECTION             

                        

CPT-4:                             2010                

 

                                                            THER/PROPH/DIAG INJ 

SC/IM                                   

                          CPT-4: 95580                   2010             

   

 

                                              VITAMIN B12 INJECTION             

                        

CPT-4:                             2010                

 

                                              ROUTINE VENIPUNCTURE              

                       

CPT-4: 04061                            2010                



                                                                                
                                                                                
                                                                                
                                                                                
                                                                                
                                                                                
                                                                                
                                                                                
                                                                                
                                                                                
                                                                                
                                                                                
                                                                                
                                                                                
                                                                                
                                                                                
                                                                                
                                                                                
                                                                                
                                                           



Vital Signs

                      



                          Date                      Vital                

 

                          2019                                       Blood

 Pressure 1: 140/70 Code: 

8480-6                                                         Heart Rate 1: 57 

bpm                                                         SpO2: 99%           

                                                            Temperature: 35.9 (C

) / 96.6 (F)   

                                                            Weight: 215 lbs     

       

                      

 

                          06/10/2019                                       Blood

 Pressure 1: 144/70 Code: 

8480-6                                                         Heart Rate 1: 60 

bpm                                                         Respiratory Rate: 20

bpm                                                         SpO2: 95%           

                                                            Temperature: 36.4 (C

) / 97.6 (F)   

                                                            Weight: 214 lbs     

       

                      

 

                          2019                                       Blood

 Pressure 1: 128/78 Code: 

8480-6                                                         Heart Rate 1: 56 

bpm                                                         Respiratory Rate: 20

bpm                                                         SpO2: 98%           

                                                            Temperature: 36.3 (C

) / 97.4 (F)   

                                                            Weight: 213 lbs     

       

                      

 

                          2018                                       Blood

 Pressure 1: 142/60 Code: 

8480-6                                                         BMI: 38.2 Code: 

34633-9                                                         Heart Rate 1: 48

bpm                                                         Height: 5'2"        

                                                            Respiratory Rate: 20

 bpm        

                                                            SpO2: 98%           

            

                                                            Temperature: 36.7 (C

) / 98.1 (F)               

                                                            Weight: 212 lbs     

                   

          

 

                          2018                                       Blood

 Pressure 1: 142/64 Code: 

8480-6                                                         BMI: 38.5 Code: 

70775-1                                                         Heart Rate 1: 52

bpm                                                         Height: 5'2"        

                                                            Respiratory Rate: 22

 bpm        

                                                            SpO2: 96%           

            

                                                            Temperature: 36.1 (C

) / 96.9 (F)               

                                                            Weight: 214 lbs     

                   

          

 

                          10/02/2018                                       Blood

 Pressure 1: 124/80 Code: 

8480-6                                                         BMI: 38.3 Code: 

66723-7                                                         Heart Rate 1: 68

bpm                                                         Height: 5'2"        

                                                            Respiratory Rate: 20

 bpm        

                                                            Temperature: 36.3 (C

) / 97.4 (F)

                                                            Weight: 213 lbs     

    

                         

 

                          2018                                       Blood

 Pressure 1: 132/78 Code: 

8480-6                                                         BMI: 37.6 Code: 

81663-1                                                         Heart Rate 1: 68

bpm                                                         Height: 5'2"        

                                                            Respiratory Rate: 20

 bpm        

                                                            SpO2: 97%           

            

                                                            Temperature: 36.8 (C

) / 98.2 (F)               

                                                            Weight: 209 lbs     

                   

          

 

                          2018                                       Blood

 Pressure 1: 150/76 Code: 

8480-6                                                         BMI: 38.5 Code: 

05624-4                                                         Heart Rate 1: 64

bpm                                                         Height: 5'2"        

                                                            Respiratory Rate: 20

 bpm        

                                                            SpO2: 97%           

            

                                                            Temperature: 36.2 (C

) / 97.2 (F)               

                                                            Weight: 214 lbs     

                   

          

 

                          2018                                       Blood

 Pressure 1: 122/74 Code: 

8480-6                                                         BMI: 38.2 Code: 

15851-9                                                         Heart Rate 1: 64

bpm                                                         Height: 5'2"        

                                                            Respiratory Rate: 18

 bpm        

                                                            SpO2: 96%           

            

                                                            Temperature: 35.8 (C

) / 96.4 (F)               

                                                            Weight: 212 lbs     

                   

          

 

                          2018                                       Blood

 Pressure 1: 124/78 Code: 

8480-6                                                         BMI: 37.8 Code: 

87818-8                                                         Heart Rate 1: 76

bpm                                                         Height: 5'2"        

                                                            Respiratory Rate: 20

 bpm        

                                                            Temperature: 36.8 (C

) / 98.3 (F)

                                                            Weight: 210 lbs     

    

                         

 

                          2018                                       Blood

 Pressure 1: 136/70 Code: 

8480-6                                                         BMI: 38.0 Code: 

81566-0                                                         Heart Rate 1: 68

bpm                                                         Height: 5'2"        

                                                            Respiratory Rate: 20

 bpm        

                                                            SpO2: 97%           

            

                                                            Temperature: 36.8 (C

) / 98.2 (F)               

                                                            Weight: 211 lbs     

                   

          

 

                          2018                                       Blood

 Pressure 1: 140/65 Code: 

8480-6                                                         Heart Rate 1: 75 

bpm                                                         Respiratory Rate: 24

bpm                                                         SpO2: 95%           

                                                            Temperature: 37.0 (C

) / 98.6 (F)   

                                                            Weight: 211 lbs     

       

                      

 

                          2018                                       Blood

 Pressure 1: 154/70 Code: 

8480-6                                                         BMI: 37.6 Code: 

92625-0                                                         Heart Rate 1: 76

bpm                                                         Height: 5'2"        

                                                            Respiratory Rate: 20

 bpm        

                                                            SpO2: 98%           

            

                                                            Temperature: 36.9 (C

) / 98.5 (F)               

                                                            Weight: 209 lbs     

                   

          

 

                          2017                                       Blood

 Pressure 1: 156/70 Code: 

8480-6                                                         BMI: 37.1 Code: 

97199-2                                                         Heart Rate 1: 72

bpm                                                         Height: 5'2"        

                                                            Respiratory Rate: 20

 bpm        

                                                            SpO2: 97%           

            

                                                            Temperature: 37.0 (C

) / 98.6 (F)               

                                                            Weight: 206 lbs     

                   

          

 

                          2017                                       Blood

 Pressure 1: 152/78 Code: 

8480-6                                                         BMI: 36.8 Code: 

24062-6                                                         Heart Rate 1: 78

bpm                                                         Height: 5'2"        

                                                            Respiratory Rate: 20

 bpm        

                                                            SpO2: 98%           

            

                                                            Temperature: 36.1 (C

) / 97.0 (F)               

                                                            Weight: 204 lbs     

                   

          

 

                          2017                                       Blood

 Pressure 1: 142/70 Code: 

8480-6                                                         BMI: 36.9 Code: 

20797-3                                                         Heart Rate 1: 64

bpm                                                         Height: 5'2"        

                                                            Respiratory Rate: 20

 bpm        

                                                            SpO2: 96%           

            

                                                            Temperature: 36.5 (C

) / 97.7 (F)               

                                                            Weight: 205 lbs     

                   

          

 

                          2017                                       Blood

 Pressure 1: 142/68 Code: 

8480-6                                                         Heart Rate 1: 88 

bpm                                                         Respiratory Rate: 18

bpm                                                         SpO2: 98%           

                                                            Temperature: 36.3 (C

) / 97.3 (F)   

                                                            Weight: 209 lbs     

       

                      

 

                          2016                                       Blood

 Pressure 1: 130/78 Code: 

8480-6                                                         Heart Rate 1: 68 

bpm                                                         Respiratory Rate: 20

bpm                                                         SpO2: 95%           

                                                            Temperature: 36.4 (C

) / 97.6 (F)   

                                                            Weight: 212 lbs     

       

                      

 

                          2016                                       Blood

 Pressure 1: 122/64 Code: 

8480-6                                                         BMI: 39.1 Code: 

99636-2                                                         Heart Rate 1: 76

bpm                                                         Height: 5'2"        

                                                            Respiratory Rate: 20

 bpm        

                                                            Temperature: 36.8 (C

) / 98.2 (F)

                                                            Weight: 217 lbs     

    

                         

 

                          2016                                       Blood

 Pressure 1: 144/70 Code: 

8480-6                                                         BMI: 39.4 Code: 

42726-7                                                         Heart Rate 1: 76

bpm                                                         Height: 5'2"        

                                                            Respiratory Rate: 20

 bpm        

                                                            Temperature: 36.6 (C

) / 97.9 (F)

                                                            Weight: 219 lbs     

    

                         

 

                          2015                                       Blood

 Pressure 1: 152/60 Code: 

8480-6                                                         BMI: 39.6 Code: 

40552-3                                                         Heart Rate 1: 84

bpm                                                         Height: 5'2"        

                                                            Respiratory Rate: 20

 bpm        

                                                            Temperature: 37.0 (C

) / 98.6 (F)

                                                            Weight: 220 lbs     

    

                         

 

                          2015                                       Blood

 Pressure 1: 146/76 Code: 

8480-6                                                         BMI: 39.8 Code: 

20157-3                                                         Heart Rate 1: 88

bpm                                                         Height: 5'2"        

                                                            Respiratory Rate: 20

 bpm        

                                                            Temperature: 37.0 (C

) / 98.6 (F)

                                                            Weight: 221 lbs     

    

                         

 

                          2015                                       Blood

 Pressure 1: 132/70 Code: 

8480-6                                                         BMI: 39.1 Code: 

97976-2                                                         Heart Rate 1: 88

bpm                                                         Height: 5'2"        

                                                            Respiratory Rate: 20

 bpm        

                                                            Temperature: 36.4 (C

) / 97.6 (F)

                                                            Weight: 217 lbs     

    

                         

 

                          2015                                       Blood

 Pressure 1: 132/66 Code: 

8480-6                                                         BMI: 39.9 Code: 

09000-8                                                         Heart Rate 1: 72

bpm                                                         Height: 5'2"        

                                                            Respiratory Rate: 20

 bpm        

                                                            Temperature: 36.9 (C

) / 98.4 (F)

                                                            Weight: 218 lbs     

    

                         

 

                          2015                                       Blood

 Pressure 1: 136/80 Code: 

8480-6                                                         Heart Rate 1: 76 

bpm                                                         Respiratory Rate: 20

bpm                                                         Temperature: 36.7 

(C) / 98.0 (F)                                                         Weight: 

224 lbs                                   

 

                          2015                                       Blood

 Pressure 1: 134/78 Code: 

8480-6                                                         BMI: 39.7 Code: 

02038-7                                                         Heart Rate 1: 84

bpm                                                         Height: 5'2"        

                                                            Respiratory Rate: 20

 bpm        

                                                            Temperature: 36.7 (C

) / 98.0 (F)

                                                            Weight: 217 lbs     

    

                         

 

                          2014                                       Blood

 Pressure 1: 146/78 Code: 

8480-6                                                         BMI: 39.5 Code: 

79128-9                                                         Heart Rate 1: 82

bpm                                                         Height: 5'2"        

                                                            Respiratory Rate: 18

 bpm        

                                                            Temperature: 35.6 (C

) / 96.1 (F)

                                                            Weight: 216 lbs     

    

                         

 

                          2014                                       Blood

 Pressure 1: 134/70 Code: 

8480-6                                                         BMI: 37.9 Code: 

48939-8                                                         Heart Rate 1: 80

bpm                                                         Height: 5'3"        

                                                            Respiratory Rate: 20

 bpm        

                                                            Temperature: 36.8 (C

) / 98.2 (F)

                                                            Weight: 214 lbs     

    

                         

 

                          2014                                       Blood

 Pressure 1: 132/70 Code: 

8480-6                                                         BMI: 37.6 Code: 

09127-1                                                         Heart Rate 1: 80

bpm                                                         Height: 5'3"        

                                                            Respiratory Rate: 20

 bpm        

                                                            Temperature: 36.8 (C

) / 98.3 (F)

                                                            Weight: 212 lbs     

    

                         

 

                          2014                                       Blood

 Pressure 1: 116/74 Code: 

8480-6                                                         Heart Rate 1: 68 

bpm                                                         Respiratory Rate: 20

bpm                                                         Temperature: 36.2 

(C) / 97.1 (F)                                                         Weight: 

212 lbs                                   

 

                          10/15/2013                                       Blood

 Pressure 1: 132/82 Code: 

8480-6                                                         BMI: 37.4 Code: 

76980-7                                                         Heart Rate 1: 76

bpm                                                         Height: 5'3"        

                                                            Respiratory Rate: 20

 bpm        

                                                            Temperature: 36.7 (C

) / 98.0 (F)

                                                            Weight: 211 lbs     

    

                         

 

                          2013                                       Blood

 Pressure 1: 130/76 Code: 

8480-6                                                         Heart Rate 1: 78 

bpm                                  

 

                          2013                                       Blood

 Pressure 1: 140/82 Code: 

8480-6                                                         BMI: 36.8 Code: 

71004-3                                                         Heart Rate 1: 66

bpm                                                         Height: 5'3"        

                                                            Respiratory Rate: 20

 bpm        

                                                            Temperature: 36.1 (C

) / 96.9 (F)

                                                            Weight: 208 lbs     

    

                         

 

                          2013                                       Blood

 Pressure 1: 138/80 Code: 

8480-6                                                         BMI: 36.4 Code: 

90364-1                                                         Heart Rate 1: 72

bpm                                                         Height: 5'4"        

                                                            Respiratory Rate: 20

 bpm        

                                                            Temperature: 36.7 (C

) / 98.0 (F)

                                                            Weight: 212 lbs     

    

                         

 

                          2013                                       Blood

 Pressure 1: 124/70 Code: 

8480-6                                                         BMI: 36.9 Code: 

93829-2                                                         Heart Rate 1: 60

bpm                                                         Height: 5'4"        

                                                            Temperature: 36.1 (C

) / 97.0 (F)

                                                            Weight: 215 lbs     

    

                         

 

                          05/10/2013                                       Blood

 Pressure 1: 132/86 Code: 

8480-6                                                         BMI: 36.9 Code: 

08558-3                                                         Heart Rate 1: 76

bpm                                                         Height: 5'4"        

                                                            Respiratory Rate: 20

 bpm        

                                                            Temperature: 36.8 (C

) / 98.2 (F)

                                                            Weight: 215 lbs     

    

                         

 

                          2013                                       Blood

 Pressure 1: 134/82 Code: 

8480-6                                                         BMI: 36.6 Code: 

88063-2                                                         Heart Rate 1: 72

bpm                                                         Height: 5'4"        

                                                            Respiratory Rate: 20

 bpm        

                                                            Temperature: 36.3 (C

) / 97.4 (F)

                                                            Weight: 213 lbs     

    

                         

 

                          2012                                       Blood

 Pressure 1: 142/80 Code: 

8480-6                                                         BMI: 37.1 Code: 

10015-4                                                         Heart Rate 1: 76

bpm                                                         Height: 5'4"        

                                                            Respiratory Rate: 20

 bpm        

                                                            Temperature: 36.8 (C

) / 98.3 (F)

                                                            Weight: 216 lbs     

    

                         

 

                          10/23/2012                                       Blood

 Pressure 1: 128/68 Code: 

8480-6                                                         BMI: 37.6 Code: 

63614-4                                                         Heart Rate 1: 72

bpm                                                         Height: 5'4"        

                                                            Respiratory Rate: 20

 bpm        

                                                            Temperature: 36.6 (C

) / 97.9 (F)

                                                            Weight: 219 lbs     

    

                         

 

                          10/10/2012                                       Blood

 Pressure 1: 122/70 Code: 

8480-6                                                         Heart Rate 1: 76 

bpm                                                         Respiratory Rate: 20

bpm                                                         Temperature: 36.7 

(C) / 98.1 (F)                                                         Weight: 

218 lbs                                   

 

                          2012                                       Blood

 Pressure 1: 132/80 Code: 

8480-6                                                         Heart Rate 1: 84 

bpm                                  

 

                          2012                                       Blood

 Pressure 1: 128/78 Code: 

8480-6                                                         BMI: 38.1 Code: 

13257-9                                                         Heart Rate 1: 84

bpm                                                         Height: 5'4"        

                                                            Respiratory Rate: 20

 bpm        

                                                            Temperature: 36.9 (C

) / 98.4 (F)

                                                            Weight: 222 lbs     

    

                         

 

                          2012                                       Blood

 Pressure 1: 138/80 Code: 

8480-6                                                         BMI: 37.9 Code: 

95521-4                                                         Heart Rate 1: 74

bpm                                                         Height: 5'4"        

                                                            Temperature: 36.1 (C

) / 97.0 (F)

                                                            Weight: 221 lbs     

    

                         

 

                          2012                                       Blood

 Pressure 1: 126/78 Code: 

8480-6                                                         BMI: 38.4 Code: 

28683-8                                                         Heart Rate 1: 72

bpm                                                         Height: 5'4"        

                                                            Respiratory Rate: 20

 bpm        

                                                            Temperature: 36.7 (C

) / 98.0 (F)

                                                            Weight: 224 lbs     

    

                         

 

                          2012                                       Blood

 Pressure 1: 138/72 Code: 

8480-6                                                         BMI: 38.4 Code: 

32052-7                                                         Heart Rate 1: 72

bpm                                                         Height: 5'4"        

                                                            Respiratory Rate: 20

 bpm        

                                                            Temperature: 36.6 (C

) / 97.9 (F)

                                                            Weight: 224 lbs     

    

                         

 

                          2012                                       Blood

 Pressure 1: 122/78 Code: 

8480-6                                                         BMI: 38.8 Code: 

37449-8                                                         Heart Rate 1: 88

bpm                                                         Height: 5'4"        

                                                            Respiratory Rate: 20

 bpm        

                                                            Temperature: 36.6 (C

) / 97.8 (F)

                                                            Weight: 226 lbs     

    

                         

 

                          2012                                       Blood

 Pressure 1: 116/60 Code: 

8480-6                                                         BMI: 38.1 Code: 

62897-9                                                         Heart Rate 1: 92

bpm                                                         Height: 5'4"        

                                                            Respiratory Rate: 20

 bpm        

                                                            Temperature: 36.8 (C

) / 98.2 (F)

                                                            Weight: 222 lbs     

    

                         

 

                          2011                                       Blood

 Pressure 1: 118/62 Code: 

8480-6                                                         BMI: 37.9 Code: 

14433-6                                                         Heart Rate 1: 80

bpm                                                         Height: 5'4"        

                                                            Temperature: 36.5 (C

) / 97.7 (F)

                                                            Weight: 221 lbs     

    

                         

 

                          2011                                       Blood

 Pressure 1: 132/70 Code: 

8480-6                                                         Heart Rate 1: 84 

bpm                                                         Respiratory Rate: 20

bpm                                                         Temperature: 36.7 

(C) / 98.0 (F)                                                         Weight: 

221 lbs                                   

 

                          2011                                       Blood

 Pressure 1: 114/72 Code: 

8480-6                                                         BMI: 37.6 Code: 

39644-3                                                         Heart Rate 1: 76

bpm                                                         Height: 5'4"        

                                                            Respiratory Rate: 20

 bpm        

                                                            Temperature: 36.8 (C

) / 98.3 (F)

                                                            Weight: 219 lbs     

    

                         

 

                          2011                                       Blood

 Pressure 1: 136/76 Code: 

8480-6                                                         Temperature: 36.0

(C) / 96.8 (F)                                                         Weight: 

219 lbs 8 oz                                  

 

                          2011                                       Blood

 Pressure 1: 128/80 Code: 

8480-6                                                         Heart Rate 1: 72 

bpm                                                         Temperature: 36.3 

(C) / 97.4 (F)                                                         Weight: 

218 lbs                                   

 

                          2011                                       Blood

 Pressure 1: 126/70 Code: 

8480-6                                                         Heart Rate 1: 72 

bpm                                                         Temperature: 36.7 

(C) / 98.0 (F)                                  

 

                          2010                                       Blood

 Pressure 1: 126/78 Code: 

8480-6                                                         Temperature: 36.2

(C) / 97.1 (F)                                                         Weight: 

217 lbs 8 oz                                  

 

                          2010                                       Blood

 Pressure 1: 116/70 Code: 

8480-6                                                         Heart Rate 1: 72 

bpm                                                         Temperature: 36.4 

(C) / 97.6 (F)                                                         Weight: 

222 lbs                                   

 

                          2010                                       Blood

 Pressure 1: 114/78 Code: 

8480-6                                                         Heart Rate 1: 72 

bpm                                                         Temperature: 37.1 

(C) / 98.8 (F)                                                         Weight: 

225 lbs                                   

 

                          2010                                       Blood

 Pressure 1: 128/78 Code: 

8480-6                                                         Heart Rate 1: 76 

bpm                                                         Temperature: 36.6 

(C) / 97.8 (F)                                                         Weight: 

224 lbs                                   

 

                          2010                                       Blood

 Pressure 1: 116/78 Code: 

8480-6                                                         Heart Rate 1: 80 

bpm                                                         Temperature: 36.4 

(C) / 97.6 (F)                                                         Weight: 

226 lbs                                   

 

                          2010                                       Blood

 Pressure 1: 120/70 Code: 

8480-6                                                         BMI: 38.3 Code: 

97760-7                                                         Heart Rate 1: 88

bpm                                                         Height: 5'5"        

                                                            Temperature: 36.4 (C

) / 97.6 (F)

                                                            Weight: 230 lbs     

    

                         



                                                                                
                                                                                
                                                                                
                                                                                
                                                                                
                                                                                
                                                                                
                                                                                
                    



Functional Status

          No Functional Status data                                             
            



History of Present Illness

                      



                    Symptom Name                   Status                   Resu

lt                  

                          Effective Date                   Notes                

 

                                   Quality                   chronic            

       

06/10/2019                              None                

 

                                   Quality                   stable             

      06/10/2019

                                        None                

 

                                   Quality                   chronic            

       

06/10/2019                              None                

 

                                   Quality                   hesitancy          

         

2019                              None                

 

                                   Quality                   stable             

      2019

                                        None                

 

                                   Quality                   acute              

     2019 

                                        None                

 

                                   Quality                   improving          

         

2019                              back on omeprazole--finished all of H py

saran regimen

but had pain in swelling in feet and hands with protonix                

 

                    gastroesophageal reflux                   Quality           

        

regurgitation of acid                   2018                   None       

        

 

                    gastroesophageal reflux                   Quality           

        chronic     

                          2018                   None                

 

                    chest pain/pressure                   Location              

     in the 

substernal area                   2018                   None             

  

 

                    chest pain/pressure                   Quality               

    stable          

                          2018                   None                

 

                          gastroesophageal reflux                   Onset and Re

solution                  

                    ongoing                   2018                   None 

               

 

                anxiety                   Quality                   acute       

            

2018                              None                

 

                anxiety                   Quality                   agitation   

                

2018                              None                

 

                    anxiety                   Onset and Resolution              

     ongoing        

                          2018                   None                

 

                arrhythmia                   Quality                   acute    

               

2018                              None                

 

                    arrhythmia                   Quality                   inter

mittent             

                          2018                   None                

 

                    arrhythmia                   Onset and Resolution           

        ongoing     

                          2018                   None                

 

                    gastroesophageal reflux                   Quality           

        acute       

                          2018                   None                

 

                    gastroesophageal reflux                   Quality           

        

regurgitation of acid                   2018                   None       

        

 

                          gastroesophageal reflux                   Onset and Re

solution                  

                    ongoing                   2018                   None 

               

 

                    gastroesophageal reflux                   Onset of Symptom  

                 

during adulthood                   2018                   None            

   

 

                    hypertension                   Onset and Resolution         

          ongoing   

                          2018                   None                

 

                    abdominal pain                   Location                   

in the epigastric 

area                      10/02/2018                   None                

 

                    abdominal pain                   Quality                   i

mproving            

                          10/02/2018                   None                

 

                    arrhythmia                   Quality                   irreg

ular beats          

                          10/02/2018                   told holter showed episod

e of atrial 

fibrillation so has to get a stress test done                

 

                    stress                   Exacerbating Factors               

    stressful life 

changes                   10/02/2018                   None                

 

                    anxiety                   Onset and Resolution              

     ongoing        

                          10/02/2018                   None                

 

                fatigue                   Quality                   chronic     

              

10/02/2018                              None                

 

                fatigue                   Quality                   constant    

               

10/02/2018                              None                

 

                    fatigue                   Onset and Resolution              

     ongoing        

                          10/02/2018                   None                

 

                    gastroesophageal reflux                   Quality           

        chronic     

                          2018                   None                

 

                          gastroesophageal reflux                   Onset and Re

solution                  

                    ongoing                   2018                   None 

               

 

                    abdominal pain                   Location                   

in the periumbilical

area                      2018                   None                

 

                    abdominal pain                   Location                   

in the epigastric 

area                      2018                   None                

 

                    abdominal pain                   Quality                   b

urning.             

                          2018                   Patient was in ER on  and was started 

on carafate 1gm QID                

 

                    muscle weakness                   Location                  

 diffusely          

                          2018                   None                

 

                    muscle weakness                   Quality                   

clumsiness          

                          2018                   None                

 

                    muscle weakness                   Quality                   

worsening           

                          2018                   None                

 

                    muscle weakness                   Quality                   

fatigue             

                          2018                   None                

 

                    hypertension                   Quality                   wor

sening              

                          2018                   None                

 

                    headache                   Location                   diffus

ely                 

                          2018                   None                

 

                    dizziness                   Quality                   lighth

eadedness           

                          2018                   None                

 

                    dizziness                   Quality                   [facility]

nce                 

                          2018                   None                

 

                    dizziness                   Onset and Resolution            

       ongoing      

                          2018                   None                

 

                    dizziness                   Onset of Symptom                

   1 1/2 months ago 

                          2018                   None                

 

                    muscle weakness                   Onset and Resolution      

             ongoing

                          2018                   None                

 

                    muscle weakness                   Onset of Symptom          

         1 1/2 

months ago                   2018                   None                

 

                    hypoglycemia                   Quality                   int

ermittent           

                          2018                   None                

 

                    neck pain                   Location                   in th

e posterior area    

                          2018                   None                

 

                    neck pain                   Quality                   improv

ing.                

                          2018                   Patient is still going to

 PT twice weekly and 

home exercises                

 

                    neck pain                   Location                   on th

e right             

                          2018                   None                

 

                    dyspnea                   Quality                   shortnes

s of breath         

                          2018                   None                

 

                    dyspnea                   Quality                   breathle

ssness              

                          2018                   None                

 

                    back pain                   Location                   in th

e right upper back 

area                      2018                   None                

 

                    back pain                   Onset and Resolution            

       ongoing      

                          2018                   None                

 

                back pain                   Quality                   dull      

             

2018                              None                

 

                    back pain                   Quality                   consta

nt                  

                          2018                   None                

 

                    back pain                   Quality                   discom

fort                

                          2018                   None                

 

                    muscle weakness                   Location                  

 diffusely          

                          2018                   None                

 

                    muscle weakness                   Quality                   

lower extremities   

                          2018                   None                

 

                    muscle weakness                   Quality                   

fatigue             

                          2018                   None                

 

                    muscle weakness                   Quality                   

clumsiness          

                          2018                   None                

 

                    muscle weakness                   Quality                   

worsening           

                          2018                   None                

 

                    otalgia                   Location                   on both

 sides              

                          2018                   None                

 

                otalgia                   Quality                   acute       

            

2018                              None                

 

                    otalgia                   Onset and Resolution              

     sudden in onset

                          2018                   None                

 

                    back pain                   Location                   in th

e left lower back 

area                      2018                   None                

 

                    back pain                   Quality                   discom

fort                

                          2018                   None                

 

                    back pain                   Radiating                   the 

inguinal area       

                          2018                   None                

 

                    back pain                   Radiating                   into

 left hip           

                          2018                   None                

 

                    back pain                   Onset of Symptom                

   1 weeks ago      

                          2018                   None                

 

                back pain                   Quality                   sharp     

              

2018                              None                

 

                    back pain                   Quality                   consta

nt                  

                          2018                   None                

 

                back pain                   Quality                   dull      

             

2017                              None                

 

                    back pain                   Quality                   improv

ing.                

                          2017                   Patient has finished PT a

nd continues to do home 

exercises                

 

                back pain                   Quality                   stable    

               

2017                              None                

 

                    back pain                   Location                   lumba

r spine             

                          2017                   None                

 

                    gait abnormality                   Quality                  

 unsteady           

                          2017                   None                

 

                    gait abnormality                   Quality                  

 improving          

                          2017                   None                

 

                    pelvic pain                   Location                   on 

the left            

                          2017                   None                

 

                pelvic pain                   Quality                   acute   

                

2017                              None                

 

                    pelvic pain                   Quality                   achi

ng                  

                          2017                   None                

 

                    pelvic pain                   Quality                   coli

cky                 

                          2017                   None                

 

                    pelvic pain                   Quality                   heav

iness               

                          2017                   None                

 

                    pelvic pain                   Onset and Resolution          

         gradual in 

onset                     2017                   None                

 

                    pelvic pain                   Onset of Symptom              

     2 months ago   

                          2017                   None                

 

                    hyperglycemia                   Quality                   gl

ucose intolerance   

                          2017                   None                

 

                    hyperglycemia                   Quality                   wo

rsening.            

                          2017                   Blood sugars have increas

ed into 110-120's   

            

 

                    hyperglycemia                   Onset of Symptom            

       2-3 months 

ago                       2017                   None                

 

                    breast complaint                   Location                 

  in the left       

                          2017                   None                

 

                    breast complaint                   Location                 

  in the right      

                          2017                   None                

 

                    breast complaint                   Quality                  

 tenderness         

                          2017                   None                

 

                    cough                   Location                   in the th

roat                

                          2017                   None                

 

                cough                   Quality                   acute         

          

2017                              None                

 

                cough                   Quality                   dry           

        

2017                              None                

 

                cough                   Quality                   hacking       

            

2017                              None                

 

                cough                   Quality                   tight         

          

2017                              None                

 

                    cough                   Onset of Symptom                   1

-2 days ago         

                          2017                   None                

 

                    otalgia                   Location                   on both

 sides              

                          2017                   None                

 

                otalgia                   Quality                   acute       

            

2017                              None                

 

                otalgia                   Quality                   throbbing   

                

2017                              None                

 

                    otalgia                   Onset and Resolution              

     sudden in onset

                          2017                   None                

 

                    otalgia                   Onset of Symptom                  

 3-5 days ago       

                          2017                   None                

 

                    chest tightness                   Location                  

 diffusely          

                          2017                   None                

 

                    chest tightness                   Quality                   

acute               

                          2017                   None                

 

                    chest tightness                   Quality                   

dull                

                          2017                   None                

 

                    chest tightness                   Quality                   

pleuritic           

                          2017                   None                

 

                    chest tightness                   Quality                   

piercing            

                          2017                   None                

 

                    chest tightness                   Onset and Resolution      

             sudden 

in onset                   2017                   None                

 

                    chest tightness                   Onset of Symptom          

         3-5 days 

ago                       2017                   None                

 

                cough                   Quality                   productive    

               

2017                              None                

 

                    nasal discharge                   Location                  

 in both  nares     

                          2017                   None                

 

                    nasal discharge                   Quality                   

acute               

                          2017                   None                

 

                    nasal discharge                   Quality                   

green               

                          2017                   None                

 

                    nasal discharge                   Quality                   

thick               

                          2017                   None                

 

                    nasal discharge                   Quality                   

worsening           

                          2017                   None                

 

                    nasal discharge                   Onset of Symptom          

         _ days ago 

                          2017                   None                

 

                    sinus congestion                   Quality                  

 acute              

                          2017                   None                

 

                    sinus congestion                   Quality                  

 fullness           

                          2017                   None                

 

                    sinus congestion                   Quality                  

 pain               

                          2017                   None                

 

                    sinus congestion                   Quality                  

 pressure           

                          2017                   None                

 

                    sinus congestion                   Onset of Symptom         

          _ days ago

                          2017                   None                

 

                    otalgia                   Location                   on the 

right               

                          2016                   None                

 

                otalgia                   Quality                   throbbing   

                

2016                              None                

 

                    otalgia                   Onset and Resolution              

     gradual in 

onset                     2016                   None                

 

                    otalgia                   Onset of Symptom                  

 1 weeks ago        

                          2016                   None                

 

                    otalgia                   Onset and Resolution              

     ongoing        

                          2016                   Has been using loratadine

 and flonase    

           

 

                otalgia                   Quality                   acute       

            

2016                              None                

 

                    otalgia                   Onset and Resolution              

     sudden in onset

                          2016                   None                

 

                    constipation                   Quality                   doc

ry 2-3 days         

                          2016                   None                

 

                    constipation                   Quality                   sma

ll stools           

                          2016                   None                

 

                constipation                   Quality                   hard   

                

2016                              None                

 

                    constipation                   Onset and Resolution         

          ongoing   

                          2016                   None                

 

                    constipation                   Alleviating Factors          

         medication.

                          2016                   Has tried various OTC chase

atments 

with very little releif                

 

                    constipation                   Alleviating Factors          

         diet 

changes                   2016                   None                

 

                    flank pain                   Location                   on b

oth sides           

                          2016                   None                

 

                    flank pain                   Quality                   stabb

ing                 

                          2016                   None                

 

                flank pain                   Quality                   sharp    

               

2016                              None                

 

                    flank pain                   Onset and Resolution           

        resolved    

                          2016                   Only had the one episode 

1 week ago  

             

 

                    back pain                   Location                   in th

e low back          

                          2016                   None                

 

                back pain                   Quality                   dull      

             

2016                              None                

 

                back pain                   Quality                   chronic   

                

2016                              None                

 

                    back pain                   Onset and Resolution            

       worse during 

the morning                   2016                   None                

 

                    hyperglycemia                   Quality                   gl

ucose intolerance   

                          2015                   None                

 

                    hyperglycemia                   Quality                   st

able                

                          2015                   None                

 

                    otalgia                   Location                   on both

 sides              

                          2015                   None                

 

                otalgia                   Quality                   dull        

           

2015                              None                

 

                    dizziness                   Quality                   interm

ittent              

                          2015                   None                

 

                    dizziness                   Quality                   lighth

eadedness           

                          2015                   None                

 

                    dizziness                   Quality                   imbala

nce                 

                          2015                   None                

 

                    hypertension                   Quality                   chr

onic                

                          2015                   None                

 

                    hypertension                   Quality                   sta

ble                 

                          2015                   None                

 

                otalgia                   Quality                   acute       

            

2015                              None                

 

                    otalgia                   Onset and Resolution              

     sudden in onset

                          2015                   None                

 

                    otalgia                   Onset and Resolution              

     ongoing        

                          2015                   None                

 

                    hypertension                   Onset and Resolution         

          ongoing   

                          2015                   None                

 

                    hypertension                   Onset of Symptom             

      during 

adulthood                   2015                   None                

 

                    hypertension                   Exacerbating Factors         

          stress    

                          2015                   None                

 

                    hypertension                   Alleviating Factors          

         medication 

                          2015                   None                

 

                    hyperglycemia                   Onset of Symptom            

       during 

adulthood                   2015                   None                

 

                otalgia                   Severity                   moderate   

                

2015                              None                

 

                    otalgia                   Triggers                   positio

n change            

                          2015                   None                

 

                    dizziness                   Onset and Resolution            

       ongoing      

                          2015                   None                

 

                    dizziness                   Quality                   rotati

on                  

                          2015                   None                

 

                    dizziness                   Quality                   loss o

f balance           

                          2015                   None                

 

                    dizziness                   Quality                   sense 

of room spinning    

                          2015                   None                

 

                dizziness                   Quality                   vertigo   

                

2015                              None                

 

                    dizziness                   Quality                   worsen

ing                 

                          2015                   None                

 

                    nasal allergies                   Location                  

 in both  nares     

                          2015                   None                

 

                    ataxia                   Quality                   feeling o

f unsteadiness with 

walking                   2015                   None                

 

                    ataxia                   Onset and Resolution               

    ongoing         

                          2015                   None                

 

                ataxia                   Quality                   worsening    

               

2015                              None                

 

                    ataxia                   Onset of Symptom                   

during adulthood    

                          2015                   None                

 

                    ataxia                   Limitation on Activities           

        necessitates

ambulation with a cane or walker                   2015                   

to make sure doesn't fall                

 

                    ataxia                   Frequency of Episodes              

     increasing     

                          2015                   None                

 

                ataxia                   Triggers                   activity    

               

2015                              has became more sedentary due to fear of

 falling   

            

 

                    muscle weakness                   Location                  

 diffusely          

                          2015                   None                

 

                    muscle weakness                   Quality                   

lower extremities   

                          2015                   None                

 

                    muscle weakness                   Quality                   

fatigue             

                          2015                   None                

 

                    muscle weakness                   Quality                   

worsening           

                          2015                   None                

 

                    muscle weakness                   Quality                   

clumsiness          

                          2015                   None                

 

                    muscle weakness                   Onset and Resolution      

             ongoing

                          2015                   None                

 

                fatigue                   Quality                   worsening   

                

2015                               passed away in July             

   

 

                    fatigue                   Quality                   low ener

gy                  

                          2015                   None                

 

                    muscle weakness                   Location                  

 diffusely          

                          2015                   None                

 

                    muscle weakness                   Quality                   

intermittent        

                          2015                   None                

 

                    muscle weakness                   Quality                   

fatigue             

                          2015                   None                

 

                    muscle weakness                   Onset and Resolution      

             ongoing

                          2015                   None                

 

                    otalgia                   Location                   on the 

right               

                          2015                   None                

 

                otalgia                   Quality                   acute       

            

2015                              None                

 

                otalgia                   Quality                   pressure    

               

2015                              None                

 

                    otalgia                   Quality                   uncomfor

table               

                          2015                   None                

 

                    otalgia                   Onset of Symptom                  

 2 weeks ago        

                          2015                   None                

 

                dizziness                   Quality                   acute     

              

2015                              None                

 

                    dizziness                   Quality                   feelin

gs of unsteadiness  

                          2015                   None                

 

                    dizziness                   Onset of Symptom                

   2 weeks ago      

                          2015                   None                

 

                    pain, limb                   Location                   on t

he right lower leg  

                          2015                   None                

 

                    pain, limb                   Quality                   inter

mittent             

                          2015                   None                

 

                    pain, limb                   Onset and Resolution           

        ongoing     

                          2015                   None                

 

                pain, limb                   Quality                   dull     

              

2015                              None                

 

                    pain, limb                   Onset of Symptom               

    2 weeks ago     

                          2015                   None                

 

                    pain, limb                   Quality                   worse

rhoda                

                          2015                   None                

 

                pain, limb                   Quality                   acute    

               

2015                              None                

 

                    pain, limb                   Pertinent Findings             

      Denies 

swelling                   2015                   None                

 

                    pain, limb                   Pertinent Findings             

      Denies warmth 

                          2015                   None                

 

                    pain, limb                   Pertinent Findings             

      muscle 

tenderness                   2015                   None                

 

                    pain, limb                   Pertinent Findings             

      Denies focal 

weakness                   2015                   None                

 

                    pain, limb                   Pertinent Findings             

      Denies 

erythema                   2015                   None                

 

                    chest pain/pressure                   Location              

     in the 

substernal area                   2015                   None             

  

 

                    chest pain/pressure                   Radiating             

      the back      

                          2015                   None                

 

                    chest pain/pressure                   Quality               

    improving       

                          2015                   None                

 

                    back pain                   Location                   [facility]

[facility]           

                          2015                   None                

 

                    back pain                   Quality                   improv

ing                 

                          2015                   None                

 

                    hyperglycemia                   Quality                   st

able                

                          2015                   Monitoring BS daily and r

anging 's         

      

 

                    hyperglycemia                   Quality                   pr

e-diabetic          

                          2015                   None                

 

                    hypertension                   Quality                   sta

ble                 

                          2015                   None                

 

                    hypertension                   Quality                   chr

onic                

                          2015                   None                

 

                    hyperlipidemia                   Quality                   s

table               

                          2015                   None                

 

                    sinusitis                   Location                   diffu

sely                

                          2014                   None                

 

                sinusitis                   Quality                   acute     

              

2014                              None                

 

                    sinusitis                   Quality                   fullne

ss                  

                          2014                   None                

 

                    sinusitis                   Onset of Symptom                

   2 days ago       

                          2014                   None                

 

                    cough                   Location                   in the th

roat                

                          2014                   None                

 

                cough                   Quality                   acute         

          

2014                              None                

 

                cough                   Quality                   dry           

        

2014                              None                

 

                cough                   Quality                   hacking       

            

2014                              None                

 

                    cough                   Onset of Symptom                   2

 weeks ago          

                          2014                   None                

 

                    otalgia                   Location                   on the 

right               

                          2014                   None                

 

                otalgia                   Quality                   acute       

            

2014                              None                

 

                otalgia                   Quality                   throbbing   

                

2014                              None                

 

                    otalgia                   Onset of Symptom                  

 1 week ago         

                          2014                   None                

 

                otalgia                   Quality                   pressure    

               

2014                              None                

 

                dizziness                   Quality                   acute     

              

2014                              None                

 

                    dizziness                   Quality                   feelin

gs of unsteadiness  

                          2014                   None                

 

                    dizziness                   Quality                   loss o

f balance           

                          2014                   None                

 

                    dizziness                   Onset of Symptom                

   1 week ago       

                          2014                   None                

 

                    muscle weakness                   Quality                   

lower extremities   

                          2014                   None                

 

                    muscle weakness                   Onset and Resolution      

             ongoing

                          2014                   Patient feels like it is 

related 

to amlodipine 2.5mg daily                

 

                    dizziness                   Quality                   sense 

of motion           

                          2014                   None                

 

                    dizziness                   Quality                   interm

ittent              

                          2014                   Inner ear issues---using 

flonase BID           

    

 

                cough                   Quality                   productive    

               

2014                              None                

 

                    back pain                   Location                   [facility]

[facility]           

                          2014                   None                

 

                back pain                   Quality                   aching    

               

2014                              None                

 

                sinusitis                   Quality                   pain      

             

2014                              None                

 

                    sinusitis                   Quality                   purule

nt                  

                          2014                   None                

 

                    sinusitis                   Quality                   fullne

ss                  

                          2014                   None                

 

                sinusitis                   Quality                   thick     

              

2014                              None                

 

                    sinusitis                   Quality                   pressu

re                  

                          2014                   None                

 

                    sinusitis                   Quality                   draina

ge                  

                          2014                   None                

 

                    sinusitis                   Onset and Resolution            

       ongoing      

                          2014                   Has been taking fluticaso

ne/claritin 

and also took leftover cefuroxime                

 

                    sinusitis                   Onset of Symptom                

   2 weeks ago      

                          2014                   None                

 

                    muscle weakness                   Quality                   

lower extremities   

                          2014                   None                

 

                    hypertension                   Onset and Resolution         

          ongoing   

                          2014                   None                

 

                    hypertension                   Onset of Symptom             

      during 

adulthood                   2014                   None                

 

                    hypertension                   Quality                   imp

roving              

                          2014                   None                

 

                    muscle weakness                   Location                  

 diffusely          

                          2014                   None                

 

                    muscle weakness                   Onset and Resolution      

             ongoing

                          2014                   None                

 

                    headache                   Location                   diffus

ely                 

                          2014                   None                

 

                headache                   Quality                   aching     

              

2014                              None                

 

                    headache                   Onset of Symptom                 

  3 days ago        

                          2014                   None                

 

                    cough                   Location                   in the th

roat                

                          2014                   None                

 

                cough                   Quality                   acute         

          

2014                              None                

 

                cough                   Quality                   productive    

               

2014                              Yellow sputum                

 

                    chest congestion                   Quality                  

 acute              

                          2014                   None                

 

                    chest congestion                   Quality                  

 painful            

                          2014                   None                

 

                    chest congestion                   Onset of Symptom         

          2 days ago

                          2014                   None                

 

                    sore throat                   Location                   dif

fusely              

                          2014                   None                

 

                    sore throat                   Quality                   achi

ng                  

                          2014                   None                

 

                    sore throat                   Onset of Symptom              

     2 days ago     

                          2014                   None                

 

                    cough                   Location                   in the th

roat                

                          10/15/2013                   None                

 

                cough                   Quality                   productive    

               

10/15/2013                              None                

 

                    chest congestion                   Quality                  

 thick/yellow 

secretions                   10/15/2013                   None                

 

                    chest congestion                   Onset of Symptom         

          2 days ago

                          10/15/2013                   Has been taking flonase a

nd 

claritin 1/2 tab daily                

 

                    myalgias                   Location                   diffus

ely                 

                          10/15/2013                   None                

 

                    hypertension                   Onset of Symptom             

      3 days ago    

                          2013                   None                

 

                    hypertension                   Quality                   acu

te                  

                          2013                   None                

 

                    hypertension                   Blood Pressure Values        

           Stage 

2:-179 mmHg / -109 mmHg                   2013              

                                        None                

 

                    headache                   Location                   in the

 occipital area     

                          2013                   None                

 

                headache                   Quality                   aching     

              

2013                              None                

 

                    headache                   Onset of Symptom                 

  3 days ago        

                          2013                   None                

 

                    lower leg pain                   Location                   

on the right        

                          2013                   None                

 

                    headache                   Onset and Resolution             

      resolved      

                          2013                   after got shot in ER     

           

 

                anxiety                   Quality                   chronic     

              

2013                              None                

 

                    anxiety                   Onset and Resolution              

     ongoing        

                          2013                   None                

 

                    dizziness                   Quality                   sense 

of room spinning    

                          2013                   None                

 

                    otalgia                   Location                   on the 

right               

                          2013                   None                

 

                    sinus pain                   Quality                   press

ure                 

                          2013                   None                

 

                    dizziness                   Onset of Symptom                

   1 weeks ago      

                          2013                   None                

 

                    headache                   Location                   diffus

ely                 

                          2013                   None                

 

                headache                   Quality                   acute      

             

2013                              None                

 

                    sinus pain                   Location                   diff

usely               

                          2013                   None                

 

                sinus pain                   Quality                   acute    

               

2013                              None                

 

                    follow up                   Illness                   sympto

ms improved         

                          2013                   None                

 

                    otalgia                   Location                   on both

 sides              

                          2013                   None                

 

                otalgia                   Quality                   stable      

             

2013                              None                

 

                    otalgia                   Onset and Resolution              

     resolved       

                          2013                   wants rechecked to see if

 does have hole

in eardrum                

 

                    otalgia                   Onset of Symptom                  

 4 days ago         

                          05/10/2013                   None                

 

                    sore throat                   Location                   on 

the right           

                          05/10/2013                   None                

 

                cough                   Quality                   dry           

        

05/10/2013                              None                

 

                    otalgia                   Location                   on the 

right               

                          05/10/2013                   hurts into throat, has tr

ied ear drops, pain 7-8

on 10 scale                

 

                    breast complaint                   Location                 

  in the left upper 

inner quadrant                   2013                   None              

 

 

                    breast complaint                   Quality                  

 pain               

                          2013                   None                

 

                    breast complaint                   Onset of Symptom         

          1 days ago

                          2013                   Hasn't had mammogram in m

any 

years                

 

                    abdominal pain                   Location                   

in the epigastric 

area                      2012                   None                

 

                    dyspepsia                   Quality                   heartb

urn                 

                          2012                   None                

 

                    dyspepsia                   Quality                   improv

ing                 

                          2012                   None                

 

                    dizziness                   Quality                   sense 

of room spinning    

                          2012                   None                

 

                    dizziness                   Onset of Symptom                

   4 days ago       

                          2012                   None                

 

                    otalgia                   Location                   on both

 sides              

                          2012                   None                

 

                    knee pain                   Location                   on [place]

th knees            

                          2012                   None                

 

                    abdominal pain                   Quality                   i

mproving            

                          2012                   None                

 

                    abdominal pain                   Quality                   i

mproving            

                          10/23/2012                   None                

 

                    gas and bloating                   Quality                  

 improving          

                          10/23/2012                   None                

 

                    hypertension                   Quality                   sta

ble                 

                          10/23/2012                   None                

 

                    abdominal pain                   Location                   

in the epigastric 

area                      10/10/2012                   None                

 

                    abdominal pain                   Onset and Resolution       

            ongoing 

                          10/10/2012                   Has been taking omeprazol

e 20mg 

BID with no relief                

 

                    gas and bloating                   Location                 

  diffusely         

                          10/10/2012                   None                

 

                    gas and bloating                   Quality                  

 intermittent       

                          10/10/2012                   None                

 

                    diarrhea                   Quality                   intermi

ttent               

                          10/10/2012                   None                

 

                    nausea                   Quality                   intermitt

ent                 

                          10/10/2012                   None                

 

                    hypertension                   Quality                   sta

ble                 

                          10/10/2012                   None                

 

                    dizziness                   Quality                   lighth

eadedness/faint     

                          2012                   None                

 

                    dizziness                   Quality                   shakin

ess                 

                          2012                   None                

 

                dizziness                   Quality                   acute     

              

2012                              has had these episodes off and on past f

ew months  

             

 

                    dizziness                   Quality                   consta

nt                  

                          2012                   None                

 

                    dizziness                   Quality                   lighth

eadedness           

                          2012                   None                

 

                    dizziness                   Quality                   feelin

gs of unsteadiness  

                          2012                   None                

 

                    dizziness                   Onset and Resolution            

       sudden in 

onset                     2012                   None                

 

                    dizziness                   Onset of Symptom                

   1 days ago       

                          2012                   None                

 

                    dysuria                   Quality                   intermit

tent                

                          2012                   None                

 

                dysuria                   Quality                   aching      

             

2012                              None                

 

                    dysuria                   Onset and Resolution              

     sudden in onset

                          2012                   None                

 

                    dysuria                   Onset of Symptom                  

 2 days ago         

                          2012                   None                

 

                    muscle weakness                   Location                  

 diffusely          

                          2012                   None                

 

                    dizziness                   Quality                   lighth

eadedness/shakiness 

                          2012                   None                

 

                headache                   Quality                   dull       

            

2012                              None                

 

                    headache                   Location                   on the

 back side of head  

                          2012                   None                

 

                    headache                   Frequency of Episodes            

       daily        

                          2012                   None                

 

                nausea                   Quality                   acute        

           

2012                              had episode Friday while was with david proctor and he 

was having bloodwork and IV fluids done                

 

                dizziness                   Quality                   acute     

              

2012                              episode recently when was with boyfriend

 at 

hospital                

 

                    leg pain/sciatica                   Location                

   right leg 

sciatica                   2012                   None                

 

                    leg pain/sciatica                   Quality                 

  sharp pain        

                          2012                   None                

 

                    leg pain/sciatica                   Quality                 

  constant          

                          2012                   None                

 

                    leg pain/sciatica                   Onset and Resolution    

               

ongoing                   2012                   None                

 

                    leg pain/sciatica                   Onset of Symptom        

           2 weeks 

ago                       2012                   None                

 

                    leg pain/sciatica                   Limitation on Activities

                   

restricts weight bearing activity                   2012                  

                                        None                

 

                    leg pain/sciatica                   Limitation on Activities

                   

moderately limits activities                   2012                   None

               

 

                    hip pain                   Location                   on the

 right              

                          2012                   None                

 

                    hip pain                   Quality                   sharp p

ain                 

                          2012                   None                

 

                    hip pain                   Quality                   radiati

ng down leg         

                          2012                   None                

 

                    hip pain                   Onset and Resolution             

      sudden in 

onset                     2012                   None                

 

                    hip pain                   Onset of Symptom                 

  3 days ago        

                          2012                   None                

 

                    back pain                   Location                   in th

e upper back area   

                          2012                   None                

 

                back pain                   Quality                   sharp     

              

2012                              None                

 

                    back pain                   Quality                   pinchi

ng                  

                          2012                   None                

 

                    back pain                   Onset of Symptom                

   1 weeks ago      

                          2012                   None                

 

                    back pain                   Location                   lumba

r-sacral spine      

                          2012                   None                

 

                back pain                   Quality                   aching    

               

2012                              None                

 

                back pain                   Quality                   sharp     

              

2012                              None                

 

                    back pain                   Quality                   radiat

ing down right leg  

                          2012                   None                

 

                    back pain                   Onset of Symptom                

   1 weeks ago      

                          2012                   None                

 

                    pain, limb                   Location                   on t

he right leg        

                          2012                   None                

 

                    muscle weakness                   Quality                   

fatigue             

                          2012                   None                

 

                    muscle weakness                   Quality                   

shakey              

                          2012                   None                

 

                    muscle weakness                   Onset of Symptom          

         2 weeks ago

                          2012                   None                

 

                    arthralgia(s)                   Quality                   cr

amping              

                          2012                   None                

 

                    fatigue                   Onset and Resolution              

     ongoing        

                          2012                   None                

 

                fatigue                   Quality                   worsening   

                

2012                              None                

 

                    fatigue                   Quality                   intermit

tent                

                          2012                   None                

 

                    low blood pressure                   Onset and Resolution   

                

ongoing                   2012                   None                

 

                    arthralgia(s)                   Quality                   in

termittent          

                          2012                   None                

 

                    otalgia                   Location                   on both

 sides              

                          2011                   None                

 

                otalgia                   Quality                   constant    

               

2011                              None                

 

                otalgia                   Quality                   throbbing   

                

2011                              None                

 

                    otalgia                   Onset and Resolution              

     sudden in onset

                          2011                   None                

 

                    otalgia                   Onset of Symptom                  

 _ weeks ago        

                          2011                   None                

 

                otalgia                   Severity                   moderate   

                

2011                              None                

 

                    otalgia                   Frequency of Episodes             

      increasing    

                          2011                   None                

 

                    sore throat                   Location                   on 

both sides          

                          2011                   None                

 

                    sore throat                   Quality                   cons

tant                

                          2011                   None                

 

                    sore throat                   Quality                   achi

ng                  

                          2011                   None                

 

                    sore throat                   Quality                   scra

tchy                

                          2011                   None                

 

                    sore throat                   Onset and Resolution          

         sudden in 

onset                     2011                   None                

 

                    sore throat                   Onset of Symptom              

     _ weeks ago    

                          2011                   None                

 

                    headache                   Location                   in the

 occipital area     

                          2011                   None                

 

                headache                   Quality                   aching     

              

2011                              None                

 

                headache                   Quality                   constant   

                

2011                              None                

 

                    headache                   Quality                   throbbi

ng                  

                          2011                   None                

 

                    headache                   Onset and Resolution             

      sudden in 

onset                     2011                   None                

 

                    headache                   Onset of Symptom                 

  1 weeks ago       

                          2011                   None                

 

                    headache                   Limitation on Activities         

          moderately

limits activities                   2011                   None           

    

 

                    headache                   Location                   in the

 frontal area       

                          2011                   None                

 

                    back pain                   Location                   thora

cic spine           

                          2011                   None                

 

                    back pain                   Quality                   improv

ing with vimovo     

                          2011                   None                

 

                    abdominal pain                   Quality                   i

mproving            

                          2011                   None                

 

                    otalgia                   Location                   on the 

right               

                          2011                   None                

 

                    dizziness                   Quality                   sense 

of room spinning    

                          2011                   None                

 

                dizziness                   Quality                   vertigo   

                

2011                              None                

 

                    dizziness                   Quality                   interm

ittent              

                          2011                   None                

 

                    back pain                   Location                   thora

cic spine           

                          2011                   None                

 

                back pain                   Quality                   aching    

               

2011                              None                

 

                    otitis media                   Quality                   acu

te                  

                          2011                   None                

 

                dizziness                   Quality                   acute     

              

2011                              None                

 

                    abdominal pain                   Quality                   i

ntermittent         

                          2011                   None                

 

                    hip pain                   Location                   on the

 right              

                          2011                   None                

 

                    hip pain                   Quality                   improve

d very little from 

last week                   2011                   None                

 

                    hip pain                   Quality                   worsene

d with twisting     

                          2011                   None                

 

                    follow up                   Illness                   sympbrad

ms improved         

                          2011                   but still having problems

                

 

                hip pain                   Quality                   constant   

                

2011                              None                

 

                    hip pain                   Onset of Symptom                 

  8 hours ago       

                          2011                   noticed when woke up this

 morning       

        

 

                    hip pain                   Quality                   sharp p

ain                 

                          2011                   None                

 

                    hip pain                   Location                   on the

 right              

                          2011                   did walk on treadmill yes

terday with no shoes  

             

 

                    hip pain                   Location                   in the

 buttocks           

                          2011                   None                

 

                    hip pain                   Location                   in the

 groin              

                          2011                   None                

 

                    low blood pressure                   Quality                

   worsening        

                          2011                   None                

 

                    depression                   Onset and Resolution           

        ongoing     

                          2011                   thinks needs lexapro dose

 increased   

            

 

                fatigue                   Quality                   improving   

                

2010                              None                

 

                    muscle weakness                   Quality                   

improving           

                          2010                   None                

 

                    follow up                   Illness                   sympbrad

ms improved         

                          2010                   with Lexapro             

   

 

                    urinary urgency                   Onset and Resolution      

             ongoing

                          2010                   but improving with increa

sed 

water intake                

 

                    follow up                   Illness                   sympbrad

ms improved         

                          2010                   had weakness and shakes--

was busy week 

getting ready for granddaughter's wedding                

 

                    weakness                   Quality                   muscle 

weakness            

                          2010                   None                

 

                    dyskinesia or tremor                   Quality              

     acute          

                          2010                   came on out of blue      

          

 

                    follow up                   Illness                   sympbrad

ms remained 

unchanged                   2010                   still with fatigue, but

did have few days of increased energy after B12 shot                

 

                    constipation                   Quality                   imp

roving              

                          2010                   with stool softeners     

           

 

                    fatigue                   Onset and Resolution              

     ongoing        

                          2010                   None                

 

                    gastroesophageal reflux                   Quality           

        chronic     

                          2010                   with hiatal hernia and po

ssible early 

Shields's                

 

                    constipation                   Quality                   imp

roving              

                          2010                   with stool softener and p

robiotic--area in 

colon where couldn't get scope through                

 

                melena                   Quality                   black        

           

2010                              at times prior to scopes                

 

                fatigue                   Quality                   chronic     

              

2010                              on iron but not B12                

 

                    weakness                   Quality                   general

ized fatigue        

                          2010                   None                

 

                    weakness                   Quality                   decreas

ed energy           

                          2010                   None                

 

                    follow up                   Reason                   Bruno

s check             

                          2010                   Having trouble with gener

alized "shakiness" 

and overwhelming fatigue                

 

                    fatigue                   Onset of Symptom                  

 1 months ago       

                          2010                   None                

 

                fatigue                   Quality                   worsening   

                

2010                              None                

 

                    fatigue                   Limitation on Activities          

         moderately 

limits activities                   2010                   Able to 

maintain household, but has several days in a row where fatigue is overwhelming.
               

 

                    weakness                   Onset of Symptom                 

  1 months ago      

                          2010                   None                

 

                    weakness                   Frequency of Episodes            

       increasing   

                          2010                   None                

 

                    weakness                   Length of Episodes               

    2-3 days        

                          2010                   None                

 

                    weakness                   Limitation on Activities         

          moderately

limits activities                   2010                   None           

    

 

                    weakness                   Alleviating Factors              

     rest           

                          2010                   None                

 

                    weakness                   Quality                   muscle 

weakness            

                          2010                   Bilat leg/knee weakness a

nd feels 

shakey--xanax does help                

 

                fatigue                   Quality                   chronic     

              

2010                              None                

 

                    weakness                   Onset and Resolution             

      ongoing       

                          2010                   started after did 2days o

f extenive 

house cleaning                

 

                    disturbances of emotion                   Quality           

        chronic     

                          2010                   gets upset easily        

        



                                                                              



Advance Directives

          No Advance Directive data                                             
                      



Encounters

                      



                    Encounter                   Performer                   Loca

tion                

                          Codes                     Date                

 

                                                            ([id number]) OFFICE/OUTPA

TIENT VISIT EST

Diagnosis: Acute serous otitis media, left ear[ICD10: H65.02]                   
                          Nat Lozoya                   [facility] [employer]

                          CPT-4: 22955                   2019             

   

 

                                                            ([id number]) OFFICE/OUTPA

TIENT VISIT EST

Diagnosis: Essential (primary) hypertension[ICD10: I10]

Diagnosis: Coronary atherosclerosis due to calcified coronary lesion[ICD10: 
I25.84]

Diagnosis: Hypoglycemia, unspecified[ICD10: E16.2]

Diagnosis: Other fatigue[ICD10: R53.83]

Diagnosis: Urinary tract infection, site not specified[ICD10: N39.0]            
                          Akanksha DRIVER [employer]                   CPT-4: 97736                   06/10/2019      

         

 

                                                            ([id number]) OFFICE/OUTPA

TIENT VISIT EST

Diagnosis: Essential (primary) hypertension[ICD10: I10]

Diagnosis: Gastro-esophageal reflux disease without esophagitis[ICD10: K21.9]

Diagnosis: Urinary tract infection, site not specified[ICD10: N39.0]            
                          Akanksha DRIVER DO [facility]                   CPT-4: 86765                   2019      

         

 

                                                            (46066) OFFICE/OUTPA

TIENT VISIT EST

Diagnosis: Gastro-esophageal reflux disease without esophagitis[ICD10: K21.9]

Diagnosis: Epigastric pain[ICD10: R10.13]                                     

Akanksha DRIVER DO [facility]            

   

                          CPT-4: 89335                   2018             

   

 

                                                            ([id number]) OFFICE/OUTPA

TIENT VISIT EST

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

Diagnosis: Essential (primary) hypertension[ICD10: I10]

Diagnosis: Generalized anxiety disorder[ICD10: F41.1]

Diagnosis: Gastro-esophageal reflux disease without esophagitis[ICD10: K21.9]   
                                 Akanksha DRIVER DO [facility]                   CPT-4: 73416                   2018   

            

 

                                                            OFFICE/OUTPATIENT VI

SIT EST

Diagnosis: Gastro-esophageal reflux disease without esophagitis[ICD10: K21.9]

Diagnosis: Palpitations[ICD10: R00.2]

Diagnosis: Urinary tract infection, site not specified[ICD10: N39.0]

Diagnosis: Generalized anxiety disorder[ICD10: F41.1]                           
                          Akanksha MEJIA [facility]      

                          CPT-4: 56226                   10/02/2018             

   

 

                                                            ([id number]) NURSE/OUTPAT

IENT VISIT EST

Diagnosis: Coronary atherosclerosis due to calcified coronary lesion[ICD10: 
I25.84]

Diagnosis: Hypoglycemia, unspecified[ICD10: E16.2]

Diagnosis: Dizziness and giddiness[ICD10: R42]

Diagnosis: Occlusion and stenosis of bilateral carotid arteries[ICD10: I65.23]  
                                  Akanksha DRIVER DO [facility]                   CPT-4: 34826                   

2018                

 

                                                            OFFICE/OUTPATIENT VI

SIT EST

Diagnosis: Epigastric pain[ICD10: R10.13]

Diagnosis: Generalized anxiety disorder[ICD10: F41.1]

Diagnosis: FLU VACCINE[ICD10: Z23]                                     

Akanksha DRIVER DO [facility]            

   

                          CPT-4: 49736                   2018             

   

 

                                                            ([id number]) OFFICE/OUTPA

TIENT VISIT EST

Diagnosis: Essential (primary) hypertension[ICD10: I10]

Diagnosis: Hypoglycemia, unspecified[ICD10: E16.2]

Diagnosis: Gastro-esophageal reflux disease without esophagitis[ICD10: K21.9]   
                                 Akanksha DRIVER [facility]                   CPT-4: 02895                   2018   

            

 

                                                            (08747) OFFICE/OUTPA

TIENT VISIT EST

Diagnosis: Dizziness and giddiness[ICD10: R42]                                  
                          Nat SPENCER

[facility]               

                          CPT-4: 31045                   2018             

   

 

                                                            (63433) OFFICE/OUTPA

TIENT VISIT EST

Diagnosis: Cervicalgia[ICD10: M54.2]

Diagnosis: Other spondylosis with radiculopathy, cervical region[ICD10: M47.22] 
                                   Akanksha DRIVER [facility]                   CPT-4: 50844                   

2018                

 

                                                            (90656) OFFICE/OUTPA

TIENT VISIT EST

Diagnosis: Hypoglycemia, unspecified[ICD10: E16.2]

Diagnosis: Other spondylosis with radiculopathy, cervical region[ICD10: M47.22]

Diagnosis: Vertigo of central origin, bilateral[ICD10: H81.43]

Diagnosis: Cervicocranial syndrome[ICD10: M53.0]                                
                          Akanksha MCKEON

[facility]           

                          CPT-4: 10011                   2018             

   

 

                                                            (77113) OFFICE/OUTPA

TIENT VISIT EST

Diagnosis: Benign paroxysmal vertigo, bilateral[ICD10: H81.13]

Diagnosis: Otalgia, bilateral[ICD10: H92.03]                                    
                          Nat SPENCER

[facility]                 

                          CPT-4: 95584                   2018             

   

 

                                                            (81561) OFFICE/OUTPA

TIENT VISIT EST

Diagnosis: Low back pain[ICD10: M54.5]

Diagnosis: Radiculopathy, lumbosacral region[ICD10: M54.17]

Diagnosis: Left lower quadrant pain[ICD10: R10.32]                              
                          [facility]         

                          CPT-4: 45376                   2018             

   

 

                                                            (67627) OFFICE/OUTPA

TIENT VISIT EST

Diagnosis: FLU VACCINE[ICD10: Z23]                                     

Akanksha DRIVER [facility]            

   

                          CPT-4: 41903                   10/06/2017             

   

 

                                                            (42432) OFFICE/OUTPA

TIENT VISIT EST

Diagnosis: Mixed hyperlipidemia[ICD10: E78.2]

Diagnosis: Essential (primary) hypertension[ICD10: I10]

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

Diagnosis: Impaired fasting glucose[ICD10: R73.01]

Diagnosis: Other fatigue[ICD10: R53.83]                                     

Akanksha DRIVER [facility]            

   

                          CPT-4: 91573                   2017             

   

 

                                                            (92856) OFFICE/OUTPA

TIENT VISIT EST

Diagnosis: Pain in thoracic spine[ICD10: M54.6]

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 
                                   Akanksha DRIVER [facility]                   CPT-4: 21469                   

2017                

 

                                                            (21071) OFFICE/OUTPA

TIENT VISIT EST

Diagnosis: Left lower quadrant pain[ICD10: R10.32]

Diagnosis: Low back pain[ICD10: M54.5]                                     

Akanksha DRIVER [facility]            

   

                          CPT-4: 32424                   2017             

   

 

                                                            (68999) OFFICE/OUTPA

TIENT VISIT EST

Diagnosis: Mixed hyperlipidemia[ICD10: E78.2]

Diagnosis: Essential (primary) hypertension[ICD10: I10]

Diagnosis: Hypoglycemia, unspecified[ICD10: E16.2]                              
                          Akanksha MEJIA [facility]         

                          CPT-4: 61238                   2017             

   

 

                                                            (79387) OFFICE/OUTPA

TIENT VISIT EST

Diagnosis: Impaired fasting glucose[ICD10: R73.01]

Diagnosis: Mastodynia[ICD10: N64.4]

Diagnosis: Mixed hyperlipidemia[ICD10: E78.2]

Diagnosis: Essential (primary) hypertension[ICD10: I10]

Diagnosis: Other fatigue[ICD10: R53.83]                                     

Akanksha DRIVER [facility]            

   

                          CPT-4: 60499                   2017             

   

 

                                                            (05032) OFFICE/OUTPA

TIENT VISIT EST

Diagnosis: Acute upper respiratory infection, unspecified[ICD10: J06.9]         
                           [place] DRIVER [facility]                   CPT-4: 14000                   2017      

         

 

                                                            (33211) OFFICE/OUTPA

TIENT VISIT EST

Diagnosis: Acute mastoiditis without complications, right ear[ICD10: H70.001]   
                                 Akanksha DRIVER [facility]                   CPT-4: 32573                   2016   

            

 

                                                            (53096) OFFICE/OUTPA

TIENT VISIT EST

Diagnosis: FLU VACCINE[ICD10: Z23]                                     

Akanksha DRIVER [facility]            

   

                          CPT-4: 77909                   10/07/2016             

   

 

                                                            (54680) OFFICE/OUTPA

TIENT VISIT EST

Diagnosis: Mixed hyperlipidemia[ICD10: E78.2]

Diagnosis: Essential (primary) hypertension[ICD10: I10]

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

Diagnosis: Impaired fasting glucose[ICD10: R73.01]                              
                          Akanksha MEJIA [facility]         

                          CPT-4: 32441                   2016             

   

 

                                                            (49594) OFFICE/OUTPA

TIENT VISIT EST

Diagnosis: Constipation, unspecified[ICD10: K59.00]                             
                          Akanksha MEJIA [facility]        

                          CPT-4: 75661                   2016             

   

 

                                                            (56760) OFFICE/OUTPA

TIENT VISIT EST

Diagnosis: Essential (primary) hypertension[ICD10: I10]

Diagnosis: Mixed hyperlipidemia[ICD10: E78.2]

Diagnosis: Chronic kidney disease, stage 1[ICD10: N18.1]                        
                          Akanksha MEJIA [facility]   

                          CPT-4: 34422                   2016             

   

 

                                                            (36128) OFFICE/OUTPA

TIENT VISIT EST

Diagnosis: Muscle weakness (generalized)[ICD10: M62.81]

Diagnosis: Dizziness and giddiness[ICD10: R42]

Diagnosis: Essential (primary) hypertension[ICD10: I10]

Diagnosis: History of falling[ICD10: Z91.81]                                    
                          Akanksha MEJIA [facility]               

                          CPT-4: 09198                   2015             

   

 

                                                            (65498) OFFICE/OUTPA

TIENT VISIT EST

Diagnosis: FLU VACCINE[ICD10: Z23]                                     

Akanksha DRIVER [facility]            

   

                          CPT-4: 67669                   10/16/2015             

   

 

                                                            (62489) OFFICE/OUTPA

TIENT VISIT EST

Diagnosis: Acute renal failure[ICD9: 584.9]

Diagnosis: POLYURIA[ICD9: 788.42]                                     Akanksha DRIVER [facility]                   CPT-4: 

94693                                   09/15/2015                

 

                                                            (34544) OFFICE/OUTPA

TIENT VISIT EST

Diagnosis: HYPERLIPIDEMIA NEC/NOS[ICD9: 272.4]

Diagnosis: HYPERTENSION[ICD9: 401.9]

Diagnosis: CAD[ICD9: 414.00]

Diagnosis: Hyperglycemia[ICD9: 790.29]

Diagnosis: MALAISE AND FATIGUE[ICD9: 780.79]                                    
                          Akanksha MEJIA [facility]               

                          CPT-4: 14915                   09/10/2015             

   

 

                                                            (52494) OFFICE/OUTPA

TIENT VISIT EST

Diagnosis: MALAISE AND FATIGUE[ICD9: 780.79]

Diagnosis: HYPOGLYCEMIA[ICD9: 251.2]

Diagnosis: Grieving[ICD9: 309.0]

Diagnosis: ABDOMINAL PAIN[ICD9: 789.00]                                     

Akanksha DRIVER [facility]            

   

                          CPT-4: 39448                   2015             

   

 

                                                            OFFICE/OUTPATIENT VI

SIT EST

Diagnosis: CERUMEN IMPACTION[ICD9: 380.4]

Diagnosis: EUSTACHIAN TUBE DYSFUNCTION[ICD9: 381.81]                            
                          Bertha Mon                   AKANKSHA S. INOCENCIO

MONY [facility]     

                          CPT-4: 54535                   2015             

   

 

                                                            (95653) OFFICE/OUTPA

TIENT VISIT EST

Diagnosis: Leg pain[ICD9: 729.5]

Diagnosis: SUPERFIC PHLEBITIS-LEG[ICD9: 451.0]                                  
                          Akanksha MEJIA [facility]             

                          CPT-4: 79668                   2015             

   

 

                                                            (25557) OFFICE/OUTPA

TIENT VISIT EST

Diagnosis: Thoracic back pain[ICD9: 724.1]

Diagnosis: SPASM OF MUSCLE[ICD9: 728.85]                                     

Akanksha DRIVER [facility]            

   

                          CPT-4: 96413                   2015             

   

 

                                                            (33225) OFFICE/OUTPA

TIENT VISIT EST

Diagnosis: DIZZINESS/VERTIGO[ICD9: 780.4]

Diagnosis: Benign positional vertigo[ICD9: 386.11]

Diagnosis: HYPERTENSION[ICD9: 401.9]

Diagnosis: Suspicious nevus[ICD9: 238.2]                                     

Akanksha DRIVER [facility]            

   

                          CPT-4: 74633                   2015             

   

 

                                                            (51947) OFFICE/OUTPA

TIENT VISIT EST

Diagnosis: FLU VACCINE[ICD10: Z23]                                     

Akanksha DRIVER [facility]            

   

                          CPT-4: 50321                   10/17/2014             

   

 

                                                            OFFICE/OUTPATIENT VI

SIT EST

Diagnosis: SINUSITIS, ACUTE[ICD9: 461.9]

Diagnosis: EUSTACHIAN TUBE DYSFUNCTION[ICD9: 381.81]                            
                          Bertha FuentesLialyuri SYKES [facility]     

                          CPT-4: 52987                   2014             

   

 

                                                            (57153) OFFICE/OUTPA

TIENT VISIT EST

Diagnosis: DIZZINESS/VERTIGO[ICD9: 780.4]

Diagnosis: HYPERTENSION[ICD9: 401.9]                                     

Akanksha DRIVER [facility]            

   

                          CPT-4: 27521                   2014             

   

 

                                                            (88389) OFFICE/OUTPA

TIENT VISIT EST

Diagnosis: HYPERTENSION[ICD9: 401.9]

Diagnosis: MALAISE AND FATIGUE[ICD9: 780.79]

Diagnosis: SINUSITIS, ACUTE[ICD9: 461.9]                                     

Akanksha DRIVER [facility]            

   

                          CPT-4: 59160                   2014             

   

 

                                                            (34661) OFFICE/OUTPA

TIENT VISIT EST

Diagnosis: HYPERLIPIDEMIA NEC/NOS[ICD9: 272.4]

Diagnosis: HYPERTENSION[ICD9: 401.9]

Diagnosis: B12 DEFIC ANEMIA NEC[ICD9: 281.1]

Diagnosis: HYPOGLYCEMIA[ICD9: 251.2]                                     

Akanksha DRIVER [facility]            

   

                          CPT-4: 76752                   2014             

   

 

                                                            OFFICE/OUTPATIENT VI

SIT EST

Diagnosis: COUGH[ICD9: 786.2]                                     Bertha FuentesJulio DRIVER [facility]                   

CPT-4: 96485                            2014                

 

                                                            OFFICE/OUTPATIENT VI

SIT EST

Diagnosis: COUGH[ICD9: 786.2]

Diagnosis: URI, ACUTE[ICD9: 465.9]                                     Bretha 

Elieser DRIVER [facility]                   

CPT-4: 59012                            10/15/2013                

 

                                                            (94124) OFFICE/OUTPA

TIENT VISIT EST

Diagnosis: HYPERTENSION[ICD9: 401.9]

Diagnosis: CEPHALGIA[ICD9: 784.0]

Diagnosis: ANXIETY STATE NOS[ICD9: 300.00]

Diagnosis: FLU VACCINE[ICD9: V04.81]                                     

Akanksha DRIVER [facility]            

   

                          CPT-4: 90056                   2013             

   

 

                                                            (33772) OFFICE/OUTPA

TIENT VISIT EST

Diagnosis: DIZZINESS/VERTIGO[ICD9: 780.4]

Diagnosis: SINUSITIS, ACUTE[ICD9: 461.9]

Diagnosis: Benign positional vertigo[ICD9: 386.11]                              
                          Akanksha MEJIA [facility]         

                          CPT-4: 19613                   2013             

   

 

                                                            OFFICE/OUTPATIENT VI

SIT EST

Diagnosis: OTITIS MEDIA NOS[ICD9: 382.9]                                     

Akanksha DRIVER [facility]            

   

                          CPT-4: 37430                   2013             

   

 

                                                            OFFICE/OUTPATIENT VI

SIT EST

Diagnosis: Perforation of ear drum[ICD9: 384.20]

Diagnosis: SINUSITIS, ACUTE[ICD9: 461.9]                                     

Akanksha DRIVER [facility]            

   

                          CPT-4: 76380                   05/10/2013             

   

 

                                                            (89213) OFFICE/OUTPA

TIENT VISIT EST

Diagnosis: Mastalgia[ICD9: 611.71]                                     

Akanksha DRIVER [facility]            

   

                          CPT-4: 43927                   2013             

   

 

                                                            (87052) OFFICE/OUTPA

TIENT VISIT EST

Diagnosis: HYPERLIPIDEMIA NEC/NOS[ICD9: 272.4]

Diagnosis: HYPERTENSION[ICD9: 401.9]

Diagnosis: DIZZINESS/VERTIGO[ICD9: 780.4]

Diagnosis: Hyperglycemia[ICD9: 790.29]

Diagnosis: MALAISE AND FATIGUE[ICD9: 780.79]                                    
                          Akanksha MEJIA [facility]               

                          CPT-4: 54182                   2012             

   

 

                                                            (97078) OFFICE/OUTPA

TIENT VISIT EST

Diagnosis: EUSTACHIAN TUBE DYSFUNCTION[ICD9: 381.81]

Diagnosis: DIZZINESS/VERTIGO[ICD9: 780.4]

Diagnosis: ABDOMINAL PAIN[ICD9: 789.00]

Diagnosis: GERD[ICD9: 530.81]

Diagnosis: CERUMEN IMPACTION[ICD9: 380.4]                                     

Akanksha DRIVER [facility]            

   

                          CPT-4: 38002                   2012             

   

 

                                                            OFFICE/OUTPATIENT VI

SIT EST

Diagnosis: ABDOMINAL PAIN[ICD9: 789.00]

Diagnosis: DYSPEPSIA[ICD9: 536.8]                                     Akanksha DRIVER [facility]                   CPT-4: 

29104                                   10/23/2012                

 

                                                            (92738) OFFICE/OUTPA

TIENT VISIT EST

Diagnosis: ABDOMINAL PAIN[ICD9: 789.00]

Diagnosis: DYSPEPSIA[ICD9: 536.8]

Diagnosis: IBS[ICD9: 564.1]                                     Akanksha DRIVER [facility]                   CPT-4: 

10683                                   10/10/2012                

 

                                                            OFFICE/OUTPATIENT VI

SIT EST

Diagnosis: DIZZINESS/VERTIGO[ICD9: 780.4]

Diagnosis: HYPOGLYCEMIA[ICD9: 251.2]                                     

Akanksha DRIVER [facility]            

   

                          CPT-4: 32237                   2012             

   

 

                                                            (57334) OFFICE/OUTPA

TIENT VISIT EST

Diagnosis: URINARY TRACT INFECTION[ICD9: 599.0]                                 
                          Akanksha MEJIA [facility]            

                          CPT-4: 70906                   2012             

   

 

                                                            (13134) OFFICE/OUTPA

TIENT VISIT EST

Diagnosis: HYPERLIPIDEMIA NEC/NOS[ICD9: 272.4]

Diagnosis: HYPERTENSION[ICD9: 401.9]

Diagnosis: MALAISE AND FATIGUE[ICD9: 780.79]

Diagnosis: DIZZINESS/VERTIGO[ICD9: 780.4]                                     

Akanksha DRIVER [facility]            

   

                          CPT-4: 90798                   2012             

   

 

                                                            (43323) OFFICE/OUTPA

TIENT VISIT EST

Diagnosis: DIZZINESS/VERTIGO[ICD9: 780.4]

Diagnosis: MALAISE AND FATIGUE[ICD9: 780.79]

Diagnosis: HYPERTENSION[ICD9: 401.9]                                     

Akanksha DRIVER [facility]            

   

                          CPT-4: 78359                   2012             

   

 

                                                            OFFICE/OUTPATIENT VI

SIT EST

Diagnosis: LUMB/LUMBOSAC DISC DEGEN[ICD9: 722.52]

Diagnosis: Radiculopathy of leg[ICD9: 724.4]

Diagnosis: SACROILIITIS NEC[ICD9: 720.2]

Diagnosis: SPINAL ENTHESOPATHY[ICD9: 720.1]                                     

Akanksha DRIVER DO [facility]            

   

                          CPT-4: 87145                   2012             

   

 

                                                            (82105) OFFICE/OUTPA

TIENT VISIT EST

Diagnosis: PAIN, LOWER BACK[ICD9: 724.2]

Diagnosis: SPASM OF MUSCLE[ICD9: 728.85]

Diagnosis: SCIATICA[ICD9: 724.3]

Diagnosis: Lumbar degenerative disc disease[ICD9: 722.52]                       
                          Akanksha MEJIA [facility]  

                          CPT-4: 58046                   2012             

   

 

                                                            (78936) OFFICE/OUTPA

TIENT VISIT EST

Diagnosis: PAIN IN THORACIC SPINE[ICD9: 724.1]

Diagnosis: SPASM OF MUSCLE[ICD9: 728.85]                                     

Akanksha DRIVER [facility]            

   

                          CPT-4: 66377                   2012             

   

 

                                                            (68552) OFFICE/OUTPA

TIENT VISIT EST

Diagnosis: PAIN, LOWER BACK[ICD9: 724.2]

Diagnosis: SPASM OF MUSCLE[ICD9: 728.85]

Diagnosis: Sacroiliac dysfunction[ICD9: 739.4]

Diagnosis: Lumbar degenerative disc disease[ICD9: 722.52]                       
                          Akanksha MEJIA [facility]  

                          CPT-4: 58092                   2012             

   

 

                                                            OFFICE/OUTPATIENT VI

SIT EST

Diagnosis: MALAISE AND FATIGUE[ICD9: 780.79]

Diagnosis: HYPOTENSION[ICD9: 458.9]

Diagnosis: CAD[ICD9: 414.00]                                     Akanksha DRIVER Guang [facility]ian [facility]                   CPT-4: 

53626                                   2012                

 

                                                            OFFICE/OUTPATIENT VI

SIT EST

Diagnosis: SINUSITIS, ACUTE[ICD9: 461.9]

Diagnosis: PHARYNGITIS, ACUTE[ICD9: 462]

Diagnosis: CERUMEN IMPACTION[ICD9: 380.4]                                     

[facility]            

   

                          CPT-4: 20597                   2011             

   

 

                                                            OFFICE/OUTPATIENT VI

SIT EST

Diagnosis: PAIN IN THORACIC SPINE[ICD9: 724.1]

Diagnosis: GERD[ICD9: 530.81]

Diagnosis: DIZZINESS/VERTIGO[ICD9: 780.4]                                     

Akanksha [facility]AN [facility]            

   

                          CPT-4: 35733                   2011             

   

 

                                                            OFFICE/OUTPATIENT VI

SIT EST                                 

                          Akanksha BAUMAN [facility]

[facility]            

                          CPT-4: 85168                   2011             

   

 

                                                            (30515) OFFICE/OUTPA

TIENT VISIT EST                         

                          Akanksha BAUMAN [facility]Aramis [facility]

[facility]    

                          CPT-4: 42770                   2011             

   

 

                                                            (95733) OFFICE/OUTPA

TIENT VISIT, EST

Diagnosis: PAIN, LOWER BACK[ICD9: 724.2]                                     

Akanksha [facility]UMAN [facility]            

   

                          CPT-4: 16249                   2011             

   

 

                                                            (05530) OFFICE/OUTPA

TIENT VISIT, EST                        

                          Akanksha BAUMAN S. ORE

[facility]   

                          CPT-4: 99870                   2011             

   

 

                                                            (19490) OFFICE/OUTPA

TIENT VISIT, EST                        

                          Akanksha BAUMAN S. [facility]

[facility]   

                          CPT-4: 12761                   2010             

   

 

                                                            (36992) OFFICE/OUTPA

TIENT VISIT, EST                        

                          Akanksha BAUMAN S. ORE

[facility]   

                          CPT-4: 52462                   2010             

   

 

                                                            (07981) OFFICE/OUTPA

TIENT VISIT, EST                        

                          Akanksha BAUMAN S. ORE

[facility]   

                          CPT-4: 54170                   2010             

   

 

                                                            (99161) OFFICE/OUTPA

TIENT VISIT, EST                        

                          Akanksha BAUMAN S. ORE

[facility]   

                          CPT-4: 69546                   2010             

   

 

                                                            (36956) OFFICE/OUTPA

TIENT VISIT, JED MCKEON

[facility]   

                          CPT-4: 63956                   2010             

   

 

                                                            (77999) OFFICE/OUTPA

TIENT VISIT, JED MCKEON

[facility]   

                          CPT-4: 66264                   2010             

   



                                                                                
                                                                                
                                                                                
                                                                                
                                                                                
                                                                                
                                                                                
                                                                                
                                                                                
                                                                                
                            



Plan of Care

                      



                    Planned Activity                   Notes                   C

odes                

                          Status                    Date                

 

                          Visit Diagnosis Plan: Acute serous otitis media, left 

ear                   

Discussion: instructed to use flonase and claritin daily for symptom relief. 
discussed with patient that if no improvement in 2 weeks, will need to follow up
with ENT for audiology exam. instructed to call office with any other symptoms 
such as otalgia, dysphagia, fever, etc.

                                        ICD-9 : 381.01

ICD-10 : H65.02

                                                    2019                

 

                                        Appointment: Nat Lozoya

                                        [address] [place], [place] [id number]                                                           ACUTE ILLNESS      

 

                                        2019                

 

                          Visit Diagnosis Plan: Essential (primary) hypertension

                   

Discussion: Stable Check CMP

                                        ICD-9 : 401.9

ICD-10 : I10

                                                    06/10/2019                

 

                          Visit Diagnosis Plan: Hypoglycemia, unspecified       

            Discussion: 

Monitor BS At least 6 small meals a day each with protein

                                        ICD-9 : 251.2

ICD-10 : E16.2

                                                    06/10/2019                

 

                          Visit Diagnosis Plan: Other fatigue                   

Discussion: Check CBC, TSH

                                        ICD-9 : 780.79

ICD-10 : R53.83

                                                    06/10/2019                

 

                                        Visit Diagnosis Plan: Urinary tract infe

ction, site not specified               

                                        Discussion: Started an old abx prescript

ion

                                        ICD-9 : 599.0

ICD-10 : N39.0

                                                    06/10/2019                

 

                                        Appointment: Akanksha Driver

WPtel:[phone] [address] [facility]

                                                           FOLLOW UP          

 

                                        06/10/2019                

 

                                        Visit Diagnosis Plan: Urinary tract infe

ction, site not specified               

                                        Discussion: Macrobid 100mg po BID for 1 

week

                                        ICD-9 : 599.0

ICD-10 : N39.0

                                                    2019                

 

                                        Visit Diagnosis Plan: Gastro-esophageal 

reflux disease without esophagitis      

                                        Discussion: Stable on omeprazole

Follow Up: 3 months

                                        ICD-9 : 530.81

ICD-10 : K21.9

                                                    2019                

 

                          Visit Diagnosis Plan: Essential (primary) hypertension

                   

Discussion: Stable Sees Dr. Clements this month

                                        ICD-9 : 401.9

ICD-10 : I10

                                                    2019                

 

                                        Appointment: Akanksha Driver

WPtel:[phone]

                                        [address] [place], [place] [address]

[place]                                                           FOLLOW UP          

 

                                        2019                

 

                                        Patient Education: metoprolol tartrate- 

[employer] Coupon [id number]              

                                        

[email or web address]/[email or web address]

[email or web address]                                             Completed       

      

                                        2019                

 

                                        Appointment: Akanksha Driver

WPtel:[phone]

                                        [facility] [facility]

[place]                                                           CANCELED           

 

                                        02/15/2019                

 

                          Visit Diagnosis Plan: Epigastric pain                 

  Discussion: Check CT 

abdomen/pelvis due to ongoing abdominal pain

                                        ICD-9 : 789.06

ICD-10 : R10.13

                                                    2018                

 

                                        Visit Diagnosis Plan: Gastro-esophageal 

reflux disease without esophagitis      

                                        Discussion: Continue protonix and carafa

te Proceed with updated EGD

                                        ICD-9 : 530.81

ICD-10 : K21.9

                                                    2018                

 

                                        Appointment: Akanksha Driver

WPtel:[phone]

                                        [facility]

[place]                                                           FOLLOW UP          

 

                                        2018                

 

                    Care Plan: CT PELVIS W/O DYE                                

       LOINC : 

60196-1

                          Pending                   2018                

 

                    Care Plan: CT ABDOMEN W/O DYE                               

        LOINC : 

24484-1

                          Pending                   2018                

 

                    Care Plan: Referral Order                                   

    SNOMED-CT : 

[id number]

                          Pending                   2018                

 

                          Visit Diagnosis Plan: Generalized anxiety disorder    

               Discussion:

Stable

                                        ICD-9 : 300.00

ICD-10 : F41.1

                                                    2018                

 

                                        Visit Diagnosis Plan: Gastro-esophageal 

reflux disease without esophagitis      

                                        Discussion: Continue protonix at BID dos

ing and carafate BID

Follow Up: 2 months

                                        ICD-9 : 530.81

ICD-10 : K21.9

                                                    2018                

 

                          Visit Diagnosis Plan: Essential (primary) hypertension

                   

Discussion: Stable

                                        ICD-9 : 401.9

ICD-10 : I10

                                                    2018                

 

                                        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

art disease of native coronary artery 

without angina pectoris                   Discussion: S/P cardiac cath--doing 

medical management with imdur and metoprolol increased Has fwup with cardiology 
next week Discussed cardiac rehab

                                        ICD-9 : 414.00

ICD-10 : I25.10

                                                    2018                

 

                                        Appointment: Akanksha Driver

WPtel:[phone]

                                        [address] [place], [place] [id number]

                                                           FOLLOW UP          

 

                                        2018                

 

                                        Visit Diagnosis Plan: Gastro-esophageal 

reflux disease without esophagitis      

                                        Discussion: Continue protonix at BID dos

ing Continue carafate at q 

AC and HS dosing for 2 more weeks then decrease to BID dosing

Follow Up: 4 weeks

                                        ICD-9 : 530.81

ICD-10 : K21.9

                                                    10/02/2018                

 

                          Visit Diagnosis Plan: Generalized anxiety disorder    

               Discussion:

Increase xanax to BID dosing especially with upcoming cardiac testing which is 
already making patient more anxious and worried

                                        ICD-9 : 300.00

ICD-10 : F41.1

                                                    10/02/2018                

 

                          Visit Diagnosis Plan: Palpitations                   D

iscussion: Cardilology 

going to schedule Lexiscan

                                        ICD-9 : 785.1

ICD-10 : R00.2

                                                    10/02/2018                

 

                                        Visit Diagnosis Plan: Urinary tract infe

ction, site not specified               

                                        Discussion: Reculture urine

                                        ICD-9 : 599.0

ICD-10 : N39.0

                                                    10/02/2018                

 

                                        Appointment: Akanksha Driver

WPtel:[phone]

                                        [address] [place], [place] [id number]

                                                           FOLLOW UP          

 

                                        10/02/2018                

 

                          Patient Education: Patient Medication Summary         

                          

                                        Completed                   10/02/2018  

              

 

                                        Appointment: Akanksha Driver

WPtel:[phone]

                                        [address] [place], [place] [address]

[place]                                                           LAB                

 

                                        2018                

 

                          Patient Education: Patient Medication Summary         

                          

                                        Completed                   2018  

              

 

                          Visit Diagnosis Plan: Epigastric pain                 

  Discussion: Continue 

protonix and carafate but make into a slurry Flu shot given Discussed EGD if 
symptoms persist

Follow Up: 2 weeks

                                        ICD-9 : 789.06

ICD-10 : R10.13

                                                    2018                

 

                          Visit Diagnosis Plan: Generalized anxiety disorder    

               Discussion:

Did discuss increased risk of benzodiazepines causing dementia but at this time 
will stay at xanax 0.25mg po BID

                                        ICD-9 : 300.00

ICD-10 : F41.1

                                                    2018                

 

                                        Appointment: Akanksha Driver

WPtel:[phone]

                                        [facility]                                                           FOLLOW UP          

 

                                        2018                

 

                          Patient Education: Patient Medication Summary         

                          

                                        Completed                   2018  

              

 

                          Visit Diagnosis Plan: Essential (primary) hypertension

                   

Discussion: Has hydralazine to use prn per cardiology Going to do 30 day holter 
monitor

                                        ICD-9 : 401.9

ICD-10 : I10

                                                    2018                

 

                          Visit Diagnosis Plan: Hypoglycemia, unspecified       

            Discussion: 6 

small meals a day--each with protein Increase fluid intake

                                        ICD-9 : 251.2

ICD-10 : E16.2

                                                    2018                

 

                                        Visit Diagnosis Plan: Gastro-esophageal 

reflux disease without esophagitis      

                                        Discussion: Change to protonix 40mg po B

ID

Follow Up: 1 months

                                        ICD-9 : 530.81

ICD-10 : K21.9

                                                    2018                

 

                                        Appointment: Akanksha Driver

WPtel:[phone]

                                        [facility]                                                           ACUTE ILLNESS      

 

                                        2018                

 

                          Patient Education: Patient Medication Summary         

                          

                                        Completed                   2018  

              

 

                          Visit Diagnosis Plan: Dizziness and giddiness         

          Discussion: 

blood work to be completed in office including cmp, cbc, troponin to rule out 
glucose intolerance, infection, dehydration. instructed patient that she needs 
to see her cardiologist sooner than oct and patient requested we contact dr. clements's office. will have front office make appt. patient has carotid doppler 
annually.

                                        ICD-9 : 780.4

ICD-10 : R42

                                                    2018                

 

                                        Appointment: Nat Lozoya

                                        [address] [place] [address]                                                           ACUTE ILLNESS      

 

                                        2018                

 

                          Patient Education: Patient Medication Summary         

                          

                                        Completed                   2018  

              

 

                          Visit Diagnosis Plan: Cervicalgia                   Di

scussion: Complete course 

of PT since symptoms improved Continue stretching exercises Notify if symptoms 
return or worsen

                                        ICD-9 : 723.1

ICD-10 : M54.2

                                                    2018                

 

                                        Appointment: Akanksha Driver

WPtel:[phone] [address] [facility]

                                                           FOLLOW UP          

 

                                        2018                

 

                          Patient Education: Patient Medication Summary         

                          

                                        Completed                   2018  

              

 

                          Visit Diagnosis Plan: Hypoglycemia, unspecified       

            Discussion: 

Eat something with protein every 2 hrs

                                        ICD-9 : 251.2

ICD-10 : E16.2

                                                    2018                

 

                                        Visit Diagnosis Plan: Other spondylosis 

with radiculopathy, cervical region     

                                        Discussion: Check MRI of cervical spine

                                        ICD-9 : 721.0

ICD-10 : M47.22

                                                    2018                

 

                          Visit Diagnosis Plan: Vertigo of central origin, bilat

eral                   

Discussion: Discussed PT for vestibular therapy

                                        ICD-9 : 386.2

ICD-10 : H81.43

                                                    2018                

 

                                        Appointment: Akanksha Driver

WPtel:[phone] [address]                                                                             

2018                

 

                          Patient Education: Patient Medication Summary         

                          

                                        Completed                   2018  

              

 

                    Care Plan: MRI NECK SPINE W/O DYE                           

            LOINC : 

82947-6

                          Pending                   2018                

 

                          Visit Diagnosis Plan: Otalgia, bilateral              

     Discussion: kenalog 

40 mg given to patient im. instructed patient to monitor blood sugar at home due
to risk of increased sugar. instructed patient to rtc on wednesday if no 
improvement and may require antibiotic.

                                        ICD-9 : 388.70

ICD-10 : H92.03

                                                    2018                

 

                          Visit Diagnosis Plan: Benign paroxysmal vertigo, bilat

eral                   

Discussion: patient has meclizine at home but states it makes her too tired. 
instructed patient to cut in half and take at night. if it doesn't cause too 
much drowsiness, instructed to take bid until feeling better. instructed to rtc 
wed if no improvement or worsening symptoms. instructed patient to increase 
fluid intake as well.

                                        ICD-9 : 386.11

ICD-10 : H81.13

                                                    2018                

 

                                        Appointment: Nat Lozoya

                                        [address] [place] [address]                                                           ACUTE ILLNESS      

 

                                        2018                

 

                          Patient Education: Patient Medication Summary         

                          

                                        Completed                   2018  

              

 

                          Visit Diagnosis Plan: Left lower quadrant pain        

           Discussion: 

Culture urine Notify if worsens

                                        ICD-9 : 789.04

ICD-10 : R10.32

                                                    2018                

 

                          Visit Diagnosis Plan: Low back pain                   

Discussion: Stretches 

Topical aspercreme Tylenol 1 po TID

                                        ICD-9 : 724.2

ICD-10 : M54.5

                                                    2018                

 

                          Visit Diagnosis Plan: Radiculopathy, lumbosacral regio

n                   

Discussion: As above

                                        ICD-9 : 724.4

ICD-10 : M54.17

                                                    2018                

 

                                        Appointment: Akanksha Driver

WPtel:[phone]

                                        [address] [place] [address]                                                           ACUTE ILLNESS      

 

                                        2018                

 

                          Patient Education: Patient Medication Summary         

                          

                                        Completed                   2018  

              

 

                                        Appointment: Akanksha Driver

WPtel:[phone]

                                        [address] [place] [address]

[place]                                                           INJECTION          

 

                                        10/06/2017                

 

                          Patient Education: Patient Medication Summary         

                          

                                        Completed                   10/06/2017  

              

 

                                        Appointment: Akanksha Driver

WPtel:[phone]

                                        [address] [place] [address]                                                           LAB                

 

                                        2017                

 

                          Patient Education: Patient Medication Summary         

                          

                                        Completed                   2017  

              

 

                          Visit Diagnosis Plan: Pain in thoracic spine          

         Discussion: PT 

has helped Continue stretches and walking Discussed joining [facility] to 
continue exercise

                                        ICD-9 : 724.1

ICD-10 : M54.6

                                                    2017                

 

                                        Visit Diagnosis Plan: Other intervertebr

al disc degeneration, lumbar region     

                                        Follow Up: 3 months

                                        ICD-9 : 722.52

ICD-10 : M51.36

                                                    2017                

 

                                        Appointment: Akanksha Driver

WPtel:[phone]

                                        [address] [place] [address]                                                           FOLLOW UP          

 

                                        2017                

 

                          Patient Education: Patient Medication Summary         

                          

                                        Completed                   2017  

              

 

                          Visit Diagnosis Plan: Low back pain                   

Discussion: Start PT for 

low back- Seems like abdominal pain is radiating from low back

Follow Up: 5 weeks

                                        ICD-9 : 724.2

ICD-10 : M54.5

                                                    2017                

 

                          Visit Diagnosis Plan: Left lower quadrant pain        

           Discussion: Had

CT scan of abdomen/pelvis in 2017 and normal colonoscopy in 2015

                                        ICD-9 : 789.04

ICD-10 : R10.32

                                                    2017                

 

                                        Appointment: Akanksha Driver

WPtel:[phone]

                                        [address] [place], [place] [id number]

                                                           ACUTE ILLNESS      

 

                                        2017                

 

                          Patient Education: Patient Medication Summary         

                          

                                        Completed                   2017  

              

 

                                        Appointment: Akanksha Driver

WPtel:[phone]

                                        [address] [place], [place] [id number]

[place]                                                           LAB                

 

                                        2017                

 

                          Patient Education: Patient Medication Summary         

                          

                                        Completed                   2017  

              

 

                          Visit Diagnosis Plan: Mixed hyperlipidemia            

       Discussion: Check 

lipids

                                        ICD-9 : 272.4

ICD-10 : E78.2

                                                    2017                

 

                          Visit Diagnosis Plan: Impaired fasting glucose        

           Discussion: 

Return in AM for CMP, HbA1C Accuchecks daily Diet/Exercise discussed at length

                                        ICD-9 : 790.29

ICD-10 : R73.01

                                                    2017                

 

                          Visit Diagnosis Plan: Other fatigue                   

Discussion: Check CBC, TSH

                                        ICD-9 : 780.79

ICD-10 : R53.83

                                                    2017                

 

                          Visit Diagnosis Plan: Mastodynia                   Dis

cussion: Check Bilateral 

diagnostic mammogram with US

                                        ICD-9 : 611.71

ICD-10 : N64.4

                                                    2017                

 

                                        Appointment: Akanksha Driver

WPtel:[phone]

                                        [address] [place], [place] [id number]

                   3/7 confirmed `sl                                       

ACUTE ILLNESS                           2017                

 

                          Patient Education: Patient Medication Summary         

                          

                                        Completed                   2017  

              

 

                    Care Plan: MAMMOGRAM SCREENING                              

         LOINC : 

07528-0

                          Pending                   2017                

 

                                        Visit Diagnosis Plan: Acute upper respir

atory infection, unspecified            

                                        Discussion: Exam is reassuring Likely vi

ral illness Supportive care 

reviewed and encouraged Follow up PRN

                                        ICD-9 : 465.9

ICD-10 : J06.9

                                                    2017                

 

                                        Appointment: Luba Stevensno 

                                        [address] [place], [place] [id number]

                                                           ACUTE ILLNESS      

 

                                        2017                

 

                          Patient Education: Patient Medication Summary         

                          

                                        Completed                   2017  

              

 

                          Visit Plan:                    Supportive care. Rest, 

Fluids, Tylenol/Motrin prn

fever or bodyaches. Notify if worsening symptoms.

                                                            2016          

  

   

 

                                        Appointment: Akanksha Driver

WPtel:[phone]

                                        [address] [place], [place] [id number]

                                                           ACUTE ILLNESS      

 

                                        2016                

 

                          Patient Education: Patient Medication Summary         

                          

                                        Completed                   2016  

              

 

                                        Appointment: Akanksha Driver

WPtel:[phone]

                                        [address] [place], [place] [id number]

US                                                           INJECTION          

 

                                        10/07/2016                

 

                          Patient Education: Patient Medication Summary         

                          

                                        Completed                   10/07/2016  

              

 

                                        Appointment: Akanksha Driver

WPtel:[phone]

                                        [address] [place], [place] [id number]

                                                           LAB                

 

                                        2016                

 

                          Patient Education: Patient Medication Summary         

                          

                                        Completed                   2016  

              

 

                          Visit Plan:                    Add miralax daily Use d

aily probiotic and 

metamucil and push fluids Notify if worsens/persists

                                                            2016          

  

   

 

                                        Appointment: Akanksha Driver

WPtel:[phone]

                                        [address] [place], [place] [id number]                        16 confirmed ~sl                                 

     

                          ACUTE ILLNESS                   2016            

    

 

                          Patient Education: Patient Medication Summary         

                          

                                        Completed                   2016  

              

 

                          Visit Plan:                    No NSAIDs Kidney functi

on discussed Discussed 

hydration Monitor lab every 4months so will check CMP, HbA1C next month

                                                            2016          

  

   

 

                          Visit Plan:                    No NSAIDs Kidney functi

on discussed Discussed 

hydration Monitor lab every 4months so will check CMP, HbA1C next month

                                                            2016          

  

   

 

                                        Appointment: Akanksha Driver

WPtel:[phone]

                                        [address] [place], [place] [id number]                   03/15 confirmed ~sl                                       

ACUTE ILLNESS                           2016                

 

                          Patient Education: Patient Medication Summary         

                          

                                        Completed                   2016  

              

 

                          Visit Plan:                    Vestibular exercises Re

fill flonase Strict low Na

diet--discussed that needs to read labels Rx for walker given Has carotids and 
abdominal aorta and legs checked in January Check Chem 7

                                                            2015          

  

   

 

                          Visit Plan:                    Vestibular exercises Re

fill flonase Strict low Na

diet--discussed that needs to read labels Rx for walker with chair given due to 
muscle weakness/unsteadiness Has carotids and abdominal aorta and legs checked 
in January Check Chem 7

                                                            2015          

  

   

 

                          Visit Plan:                    Vestibular exercises Re

fill flonase Strict low Na

diet--discussed that needs to read labels Rx for walker given Has carotids and 
abdominal aorta and legs checked in January Check Chem 7

                                                            2015          

  

   

 

                          Visit Plan:                    Vestibular exercises Re

fill flonase Strict low Na

diet--discussed that needs to read labels Rx for walker with chair given due to 
muscle weakness/unsteadiness Has carotids and abdominal aorta and legs checked 
in January Check Chem 7

                                                            2015          

  

   

 

                                        Appointment: Akanksha Driver

WPtel:[phone]

                                        [address] [place], [place] [address]                        12/15/15 appt confirmed cn                            

     

                          FOLLOW UP                   2015                

 

                          Patient Education: Patient Medication Summary         

                          

                                        Completed                   2015  

              

 

                    Patient Education: Vertigo                                  

                     

                          Completed                   2015                

 

                                        Appointment: Akanksha Driver

WPtel:[phone]

                                        [address] [place], [place] [id number]

US                                                           INJECTION          

 

                                        10/16/2015                

 

                          Patient Education: Patient Medication Summary         

                          

                                        Completed                   10/16/2015  

              

 

                                        Appointment: Akanksha Driver

WPtel:[phone]

                                        [address] [place], [place] [id number]                                                           UA                 

 

                                        09/15/2015                

 

                          Patient Education: Patient Medication Summary         

                          

                                        Completed                   09/15/2015  

              

 

                                        Referral: Landon De La Torre

WPtel:[phone]

#[facility] [address]                   Referral                                       Completed   

                                        2015                

 

                                        Appointment: Akanksha Driver

WPtel:[phone]

                                        [address] [place], [place] [id number]                                                           LAB                

 

                                        09/10/2015                

 

                          Patient Education: Patient Medication Summary         

                          

                                        Completed                   09/10/2015  

              

 

                          Visit Plan:                    Check CMP, CBC, TSH, Fr

ee T4, B12, Lipids, HbA1C 

Discussed 6 small meals a day each with protein Would likely benefit from 
antidepressant Update colonoscopy

                                                            2015          

  

   

 

                                        Appointment: Akanksha Driver

WPtel:[phone]

                                        [address] [place], [place] [id number]                   9/8/15 confirmed                                       

ACUTE ILLNESS                           2015                

 

                          Patient Education: Patient Medication Summary         

                          

                                        Completed                   2015  

              

 

                          Visit Plan:                    Cerumen flush with warm

 water and peroxide - good

results Resume Nasonex nasal spray daily Recommended Debrox earwax removal drops

                                                            2015          

  

   

 

                          Visit Plan:                    Cerumen flush with warm

 water and peroxide - good

results Resume Nasonex nasal spray daily Recommended Debrox earwax removal drops

                                                            2015          

  

   

 

                                        Appointment: Bertha Mon

WPtel:[phone]

                                        [address] [place] [address]                                                           ACUTE ILLNESS      

 

                                        2015                

 

                          Patient Education: Patient Medication Summary         

                          

                                        Completed                   2015  

              

 

                          Visit Plan:                    Continue aspirin daily 

Add meloxicam for 1week 

Elevate and Ice Call on 2015          

  

   

 

                                        Appointment: Akanksha Driver

WPtel:[phone]

                                        [address] [place] [address]                                                           ACUTE ILLNESS      

 

                                        2015                

 

                          Patient Education: Patient Medication Summary         

                          

                                        Completed                   2015  

              

 

                          Visit Plan:                    Been using baclofen at 

bedtime and has helped 

some PT for next 2-4weeks

                                                            2015          

  

   

 

                                        Appointment: Akanksha Driver

WPtel:[phone]

                                        [address] [place] [address]                                                           Hospital Follow Up 

 

                                        2015                

 

                          Patient Education: Patient Medication Summary         

                          

                                        Completed                   2015  

              

 

                                        Appointment: Akanksha Driver

WPtel:[phone]

                                        [address] [place] [address]                                                           LAB                

 

                                        2015                

 

                                        Appointment: Akanksha Driver

WPtel:[phone]

                                        [address] [place] [address]                        feeling better, weather -                           

     

                          FOLLOW UP                   2015                

 

                                        Referral: Landon De La Torre

WPtel:[phone]

[facility] [address]                   Referral                                       Appointment 

Requested                               2015                

 

                          Visit Plan:                    Continue exercise and c

urrent meds See surgery 

for removal of right arm lesion

                                                            2015          

  

   

 

                                        Appointment: Akanksha Driver

WPtel:[phone]

                                        [address] [place] [address]                                                           FOLLOW UP          

 

                                        2015                

 

                          Patient Education: Patient Medication Summary         

                          

                                        Completed                   2015  

              

 

                                        Appointment: Akanksha Driver

WPtel:[phone]

                                        [address] [place] [address]

US                                                           INJECTION          

 

                                        10/17/2014                

 

                          Patient Education: Patient Medication Summary         

                          

                                        Completed                   10/17/2014  

              

 

                          Visit Plan:                    Resume Claritin PO kathleen

y May take Ibuprofen PM at

night for sleep Continue Flonase 2 sprays each nostril bid Complete prednisone 
20 mg PO bid

                                                            2014          

  

   

 

                                        Appointment: Bertha Mon

WPtel:[phone]

                                        [address] [place], [place] [id number]                                                           ACUTE ILLNESS      

 

                                        2014                

 

                          Patient Education: Patient Medication Summary         

                          

                                        Completed                   2014  

              

 

                          Visit Plan:                    Discussed that likely l

umbar etiology for leg 

weakness Will change amlodopine to low dose dyazide and see if helps legs and 
inner ear

                                                            2014          

  

   

 

                                        Appointment: Akanksha Driver

WPtel:[phone]

                                        [address] [place], [place] [id number]                                                           FOLLOW UP          

 

                                        2014                

 

                          Patient Education: Patient Medication Summary         

                          

                                        Completed                   2014  

              

 

                          Visit Plan:                    Ceftin and continue cla

ritin/flonase Decrease 

amlodopine to 2.5mg q HS until fwup with Card due to weakness

                                                            2014          

  

   

 

                                        Appointment: Akanksha Driver

WPtel:[phone]

                                        [address] [place], [place] [id number]                                                           ACUTE ILLNESS      

 

                                        2014                

 

                          Patient Education: Patient Medication Summary         

                          

                                        Completed                   2014  

              

 

                                        Appointment: Akanksha Driver

WPtel:[phone]

                                        [address] [place], [place] [id number]

                                                           LAB                

 

                                        2014                

 

                          Patient Education: Patient Medication Summary         

                          

                                        Completed                   2014  

              

 

                          Visit Plan:                    States she is having he

r carotids "done" this 

14 Return this week for fasting labs. CBC, CMP, TSH Free T4, 
Lipid Panel and HgbA1C.

                                                            2014          

  

   

 

                                        Appointment: Bertha Mon

WPtel:[phone]

                                        [address] [place], [place] [id number]

                                                           ACUTE ILLNESS      

 

                                        2014                

 

                          Patient Education: Patient Medication Summary         

                          

                                        Completed                   2014  

              

 

                          Visit Plan:                    Supportive care. Rest, 

Fluids, Tylenol prn fever 

or bodyaches. Notify if worsening symptoms. Ceftin

                                                            10/15/2013          

  

   

 

                                        Appointment: Bertha Mon

WPtel:[phone]

                                        [address] [place], [place] [id number]                                                           ACUTE ILLNESS      

 

                                        10/15/2013                

 

                          Patient Education: Patient Medication Summary         

                          

                                        Completed                   10/15/2013  

              

 

                                        Appointment: Akanksha Driver

WPtel:[phone]

                                        [address] [place], [place] [id number]

                                                           BP CHECK           

 

                                        2013                

 

                          Patient Education: Patient Medication Summary         

                          

                                        Completed                   2013  

              

 

                          Visit Plan:                    Continue increased dose

 of metoprolol Moniter BP 

at home BP check in 1wk Xanax refill to use prn

                                                            2013          

  

   

 

                          Visit Plan:                    Continue increased dose

 of metoprolol Moniter BP 

at home BP check in 1wk

                                                            2013          

  

   

 

                                        Appointment: Akanksha Driver

WPtel:[phone]

                                        [address] [place], [place] [address]                                                           ER Follow UP       

 

                                        2013                

 

                          Patient Education: Patient Medication Summary         

                          

                                        Completed                   2013  

              

 

                          Visit Plan:                    Restart flonase BID Use

 meclizine 25mg q HS 

Vestibular exercises Claritin 10mg q AM See ENT if doesn't resolve

                                                            2013          

  

   

 

                                        Appointment: Akanksha Driver

WPtel:[phone]

                                        [address] [place] [address]                                                           ACUTE ILLNESS      

 

                                        2013                

 

                          Patient Education: Patient Medication Summary         

                          

                                        Completed                   2013  

              

 

                          Visit Plan:                    Will continue to observ

e

                                                            2013          

  

   

 

                                        Appointment: Akanksha Driver

WPtel:[phone]

                                        [address] [place], [place] [address]                                                           FOLLOW UP          

 

                                        2013                

 

                          Patient Education: Patient Medication Summary         

                          

                                        Completed                   2013  

              

 

                          Visit Plan:                    ERx for Augmentin 875mg

 q12 x 10 days and Floxin 

otic drops 5 gtts AD x7days Sample of Nasonex per pt request Discussed treatment
(nasal saline, salt water gargles, keeping right ear clean and dry, for 
worsening go to UC on weekend, Tylenol/ibuprofen, etc.) RTC 2 weeks for ear 
recheck.

                                                            05/10/2013          

  

   

 

                                        Appointment: Jill Jean 

WPtel:[phone] [address]

[id number]

                                                           ACUTE ILLNESS      

 

                                        05/10/2013                

 

                          Patient Education: Patient Medication Summary         

                          

                                        Completed                   05/10/2013  

              

 

                          Visit Plan:                    Decrease caffeine intak

e Check Bilateral 

Mammogram with US of left breast

                                                            2013          

  

   

 

                                        Appointment: Akanksha Driver

WPtel:[phone]

                                        [address] [place], [place] [id number]

                                                           ACUTE ILLNESS      

 

                                        2013                

 

                          Patient Education: Patient Medication Summary         

                          

                                        Completed                   2013  

              

 

                                        Appointment: Kate Hall

WPtel:[phone]

                                        [address] [place], [place] [id number]                   appt scheduled                                        

ACUTE ILLNESS                           2013                

 

                                        Appointment: Akanksha Driver

WPtel:[phone]

                                        [address] [place], [place] [id number]

[place]                                                           LAB                

 

                                        2012                

 

                          Patient Education: Patient Medication Summary         

                          

                                        Completed                   2012  

              

 

                          Visit Plan:                    Continue off carafate a

nd see how does Continue 

probiotic Add Vestibular exercises and use meclizine prn Continue Nasonex

                                                            2012          

  

   

 

                          Visit Plan:                    Continue off carafate a

nd see how does Continue 

probiotic Add Vestibular exercises and use meclizine prn Continue Nasonex Check 
fasting lab including CMP, Lipids, CBC, TSH, Free T4, HbA1C

                                                            2012          

  

   

 

                                        Appointment: Akanksha Driver

WPtel:[phone]

                                        [address] [place], [place] [id number]

                                                           FOLLOW UP          

 

                                        2012                

 

                          Patient Education: Patient Medication Summary         

                          

                                        Completed                   2012  

              

 

                          Visit Plan:                    Continue carafate for 4

more weeks at current dose

then decrease to BID for 2wks then q HS

                                                            10/23/2012          

  

   

 

                                        Appointment: Akanksha Driver

WPtel:[phone]

                                        [address] [place], [place] [id number]

[place]                                                           FOLLOW UP          

 

                                        10/23/2012                

 

                          Patient Education: Patient Medication Summary         

                          

                                        Completed                   10/23/2012  

              

 

                          Visit Plan:                    Continue omeprazole Add

 Carafate 1gm po q AC Add 

daily probiotic

                                                            10/10/2012          

  

   

 

                                        Appointment: Akanksha Driver

WPtel:[phone]

                                        [address] [place] [address]                                                           ACUTE ILLNESS      

 

                                        10/10/2012                

 

                          Patient Education: Patient Medication Summary         

                          

                                        Completed                   10/10/2012  

              

 

                                        Appointment: Akanksha Drievr

WPtel:[phone]

                                        [address] [place] [address]                                                           INJECTION          

 

                                        2012                

 

                          Patient Education: Patient Medication Summary         

                          

                                        Completed                   2012  

              

 

                          Visit Plan:                    Diabetic Diet Accucheck

s BID alternating times 

Hold onglyza and Januvia for now and continue diet and exercise and weight loss 
and drew BS Discussed hypoglycemia and snack of peanut butter and crackers 
with juice or milk

                                                            2012          

  

   

 

                                        Appointment: Akanksha Driver

WPtel:[phone]

                                        [address] [place] [address]                                                           WORK IN            

 

                                        2012                

 

                          Patient Education: Patient Medication Summary         

                          

                                        Completed                   2012  

              

 

                                        Appointment: Akanksha Driver

WPtel:[phone]

                                        [address] [place] [address]                                                           UA                 

 

                                        2012                

 

                          Patient Education: Patient Medication Summary         

                          

                                        Completed                   2012  

              

 

                                        Appointment: Akanksha Driver

WPtel:[phone]

                                        [address] [place] [address]                                                           LAB                

 

                                        2012                

 

                          Patient Education: Patient Medication Summary         

                          

                                        Completed                   2012  

              

 

                          Visit Plan:                    Fasting lab to check CM

P, Lipids, CBC, TSH, Free 

T4, HbA1C, Insulin level Hold Zocor for next 2wks

                                                            2012          

  

   

 

                                        Appointment: Akanksha Driver

WPtel:[phone]

                                        [address] [place] [address]                                                           ACUTE ILLNESS      

 

                                        2012                

 

                          Patient Education: Patient Medication Summary         

                          

                                        Completed                   2012  

              

 

                          Visit Plan:                    Injection to Right SI j

oint as above Pt will call

in 3 days on pain Has PT starting in 2wks.

                                                            2012          

  

   

 

                                        Appointment: Akanksha Driver

WPtel:[phone]

                                        [address] [place] [address]                                                           ACUTE ILLNESS      

 

                                        2012                

 

                          Patient Education: Patient Medication Summary         

                          

                                        Completed                   2012  

              

 

                          Visit Plan:                    OMT done Start PT May n

eed updated MRI if need to

consider epidural Prednisone

                                                            2012          

  

   

 

                                        Appointment: Akanksha Drivertel:[phone]

                                        [address] [place] [address]                                                           OMT                

 

                                        2012                

 

                          Patient Education: Patient Medication Summary         

                          

                                        Completed                   2012  

              

 

                          Visit Plan:                    Flexeril and Vimovo OMT

 done Daily stretches

                                                            2012          

  

   

 

                                        Appointment: Akanksha Driver

WPtel:[phone]

                                        [address] [place] [address]                                                           ACUTE ILLNESS      

 

                                        2012                

 

                          Patient Education: Patient Medication Summary         

                          

                                        Completed                   2012  

              

 

                          Visit Plan:                    OMT done Daily stretche

s Moist heat or biofreeze 

Right SI joint injection Call in 1week Vimovo BID

                                                            2012          

  

   

 

                                        Appointment: Akanksha Driver

WPtel:[phone]

                                        [address] [place] [address]                                                           ACUTE ILLNESS      

 

                                        2012                

 

                          Patient Education: Patient Medication Summary         

                          

                                        Completed                   2012  

              

 

                                        Appointment: Akanksha Driver

WPtel:[phone]

                                        [address] [place] [address]                                                           LAB                

 

                                        2012                

 

                          Patient Education: Patient Medication Summary         

                          

                                        Completed                   2012  

              

 

                          Visit Plan:                    Decrease amlodopine to 

1.25mg daily Schedule with

Cardiology Fasting lab in AM

                                                            2012          

  

   

 

                                        Appointment: Akanksha Driver

WPtel:[phone]

                                        [address] [place] [address]                                                           ACUTE ILLNESS      

 

                                        2012                

 

                          Patient Education: Patient Medication Summary         

                          

                                        Completed                   2012  

              

 

                          Visit Plan:                    Saline nasal flushes pr

n. Tylenol/Motrin prn 

headache. Notify if persists/symptoms worsening. Cerumen removal from ears as 
above

                                                            2011          

  

   

 

                                        Appointment: Akanksha Driver

WPtel:[phone]

                                        [address] [place] [address]                                                           ACUTE ILLNESS      

 

                                        2011                

 

                          Patient Education: Patient Medication Summary         

                          

                                        Completed                   2011  

              

 

                                        Appointment: Akanksha Driver

WPtel:[phone]

                                        [address] [place], [place] [id number]

[place]                                                           INJECTION          

 

                                        10/05/2011                

 

                          Patient Education: Patient Medication Summary         

                          

                                        Completed                   10/05/2011  

              

 

                          Visit Plan:                    Continue vimovo for 1mo

re week Increase 

Omeprazole to BID for 1mo then resume QD if stomach improved Vestibular 
exercises with meclizine q HS for next week

                                                            2011          

  

   

 

                                        Appointment: Akanksha Driver

WPtel:[phone]

                                        [address] [place], [place] [address]

                                                           FOLLOW UP          

 

                                        2011                

 

                          Patient Education: Patient Medication Summary         

                          

                                        Completed                   2011  

              

 

                          Visit Plan:                    Trial of Vimovo 50/200m

g po BID Check UA

                                                            2011          

  

   

 

                                        Appointment: Akanksha Driver

WPtel:[phone]

                                        [address] [place] [address]                                                           ACUTE ILLNESS      

 

                                        2011                

 

                          Patient Education: Patient Medication Summary         

                          

                                        Completed                   2011  

              

 

                                        Appointment: Akanksha rDiver

WPtel:[phone]

                                        [address] [place], [place] [address]                                                           LAB                

 

                                        2011                

 

                          Patient Education: Patient Medication Summary         

                          

                                        Completed                   2011  

              

 

                          Visit Plan:                    Saline nasal flushes pr

n. Tylenol/Motrin prn 

headache. Notify if persists/symptoms worsening. Add Veramyst Increase 
Omeprazole to 20mg po BID

                                                            2011          

  

   

 

                                        Appointment: Akanksha Driver

WPtel:[phone]

                                        [address] [place] [address]                                                           ACUTE ILLNESS      

 

                                        2011                

 

                          Patient Education: Patient Medication Summary         

                          

                                        Completed                   2011  

              

 

                          Visit Plan:                    Injection to right SI j

oint as above Continue 

Vimovo Daily stretches Pt will call at end of week to let us know how back is 
doing

                                                            2011          

  

   

 

                                        Appointment: Akanksha Driver

WPtel:[phone]

                                        [address] [place], [place] [id number]

                                                           FOLLOW UP          

 

                                        2011                

 

                          Patient Education: Patient Medication Summary         

                          

                                        Completed                   2011  

              

 

                          Visit Plan:                    Toradol 30mg IM now x1 

Vimovo 200/50 po BID 

Decrease Norvasc to 1/2 tablet daily Increase Lexaprol to 10mg QD

                                                            2011          

  

   

 

                                        Appointment: Akanksha Driver

WPtel:[phone]

                                        [address] [place] [address]                                                           ACUTE ILLNESS      

 

                                        2011                

 

                          Patient Education: Patient Medication Summary         

                          

                                        Completed                   2011  

              

 

                          Visit Plan:                    Nasocourt sample given.

 Pt. will notify if 

symptoms are worse on Monday.

                                                            2010          

  

   

 

                                        Appointment: Kate Hall

WPtel:[phone]

                                        [address] [place] [address]                                                           ACUTE ILLNESS      

 

                                        2010                

 

                          Patient Education: Patient Medication Summary         

                          

                                        Completed                   2010  

              

 

                                        Appointment: Akanksha Driver

WPtel:[phone]

                                        [address] [place] [address]

[place]                                                           INJECTION          

 

                                        10/06/2010                

 

                          Patient Education: Patient Medication Summary         

                          

                                        Completed                   10/06/2010  

              

 

                          Visit Plan:                    Cipro for UTI Cont [place]

pro at 5mg QD Flu shot 

next week

                                                            2010          

  

   

 

                                        Appointment: Akanksha Driver

WPtel:[phone]

                                        [address] [place] [address]                                                           FOLLOW UP          

 

                                        2010                

 

                          Patient Education: Patient Medication Summary         

                          

                                        Completed                   2010  

              

 

                    Patient Education: Lexapro                                  

                     

                          Completed                   2010                

 

                          Visit Plan:                    May proceed with hiatal

 hernia repair per Card. 

so will contact Dr. Cash's office to proceed with surgery Trial of 
Lexapro 5mg QD plus use xanax prn

                                                            2010          

  

   

 

                          Visit Plan:                    May proceed with hiatal

 hernia repair per Card. 

so will contact Dr. Cash's office to proceed with surgery

                                                            2010          

  

   

 

                                        Appointment: Akanksha Driver

WPtel:[phone]

                                        [address] [place], [place] [id number]

[place]                                                           FOLLOW UP          

 

                                        2010                

 

                          Patient Education: Patient Medication Summary         

                          

                                        Completed                   2010  

              

 

                          Visit Plan:                    B12 given Cont oral B12

 and iron Fwup 1mo for B12

Proceed with hiatal hernia repair once card clearance

                                                            2010          

  

   

 

                                        Appointment: Akanksha Driver

WPtel:[phone]

                                        [address] [place], [place] [id number]

[place]                                                           FOLLOW UP          

 

                                        2010                

 

                          Patient Education: Patient Medication Summary         

                          

                                        Completed                   2010  

              

 

                          Visit Plan:                    No caffeine, no nicotin

e, no mints, no late 

meals, elevate HOB 30 degrees Cont. with Nexium See Card to discuss Hiatal 
Hernia Repair B12 given

                                                            2010          

  

   

 

                                        Appointment: Akanksha Driver

WPtel:[phone]

                                        [address] [place], [place] [id number]

                                                           FOLLOW UP          

 

                                        2010                

 

                          Patient Education: Patient Medication Summary         

                          

                                        Completed                   2010  

              

 

                                        Appointment: Akanksha Driver

WPtel:[phone]

                                        [address] [place], [place] [id number]

[place]                                                           LAB                

 

                                        2010                

 

                          Patient Education: Patient Medication Summary         

                          

                                        Completed                   2010  

              

 

                          Visit Plan:                    Check fasting lab in AM

--CMP,Lipids, CBC, Vit D, 

TSH,FreeT4, B12

                                                            2010          

  

   

 

                                        Appointment: Akanksha Driver

WPtel:[phone]

                                        [address] [place], [place] [id number]

                                                           FOLLOW UP          

 

                                        2010                

 

                          Patient Education: Patient Medication Summary         

                          

                                        Completed                   2010  

              

 

                                        Referral: Landon De La Torre

WPtel:[phone]

#[address] [id number]

                   Referral                                       Appointment 

Requested                                               

 

                                        Referral: Landon De La Torre

WPtel:[phone]

[address]                   Referral                                       Initiated   

                                                        



                                                                                
                                                                                
                                                                                
                                                                                
                                                                                
                                                                                
                                                                                
                                                                                
                                                                                
                                                                                
                                                                                
                                                                                
                                                                                
                                                                                
                                                                                
                                                                                
                                                                                
                                                                                
                                                                                
                                                                                
                                                                                
                                                                                
                                                                  



Instructions

                      



                                        Comment                

 

                                                            . Add miralax daily

Use daily probiotic and metamucil and push fluids

Notify if worsens/persists                                  

 

                                                            . Continue aspirin d

aily

Add meloxicam for 1week

Elevate and Ice

Call on Monday                                  

 

                                                            . Continue omeprazol

e

Add Carafate 1gm po q AC

Add daily probiotic

                                  

 

                                                            . States she is havi

ng her carotids "done" this 14

Return this week for fasting labs.  CBC, CMP, TSH Free T4, Lipid Panel and 
HgbA1C.                                  

 

                                                            . B12 given

Cont oral B12 and iron

Fwup 1mo for B12

Proceed with hiatal hernia repair once card clearance                           
      

 

                                                            .  Saline nasal flus

hes prn. Tylenol/Motrin prn headache.  

Notify if persists/symptoms worsening.

Cerumen removal from ears as above

                                  

 

                                                            . Been using baclofe

n at bedtime and has helped some

PT for next 2-4weeks

                                  

 

                                                            . Check fasting lab 

in AM--CMP,Lipids, CBC, Vit D, 

TSH,FreeT4, B12                                  

 

                                                            . Cipro for UTI

Cont Lexapro at 5mg QD

Flu shot next week                                  

 

                                                            . ERx for Augmentin 

875mg q12 x 10 days and Floxin otic 

drops 5 gtts AD x7days

Sample of Nasonex per pt request

Discussed treatment (nasal saline, salt water gargles, keeping right ear clean 
and dry, for worsening go to [facility] on weekend, Tylenol/ibuprofen, etc.)

RTC 2 weeks for ear recheck.                                  

 

                                                            . Continue increased

 dose of metoprolol

Moniter BP at home

BP check in 1wk

Xanax refill to use prn                                  

 

                                                            . Continue increased

 dose of metoprolol

Moniter BP at home

BP check in 1wk                                  

 

                                                            . Restart flonase BI

D

Use meclizine 25mg q HS

Vestibular exercises

Claritin 10mg q AM

See ENT if doesn't resolve                                  

 

                                                            Right SI joint clean

sed with alchohol and betadine and 

injected with 3cc 1% lidocaine with 40mg depomedrol and 40mg kenalog, tolerated 
well with no complications, neosporin and bandage applied. OMT done

Daily stretches

Moist heat or biofreeze

Right SI joint injection

Call in 1week

Vimovo BID                                  

 

                                                            . Fasting lab to judith

ck CMP, Lipids, CBC, TSH, Free T4, 

HbA1C, Insulin level

Hold Zocor for next 2wks                                  

 

                                                            . Discussed that lik

ely lumbar etiology for leg weakness

Will change amlodopine to low dose dyazide and see if helps legs and inner ear  
                               

 

                                                            . Will continue to o

bserve

                                  

 

                                                            . May proceed with h

iatal hernia repair per Card. so will 

contact Dr. Cash's office to proceed with surgery



Trial of Lexapro 5mg QD plus use xanax prn                                  

 

                                                            . May proceed with h

iatal hernia repair per Card. so will 

contact Dr. Cash's office to proceed with surgery                        
         

 

                                                            . OMT done

Start PT

May need updated MRI if need to consider epidural

Prednisone                                  

 

                                                            . Toradol 30mg IM no

w x1

Vimovo 200/50 po BID

Decrease Norvasc to 1/2 tablet daily

Increase Lexaprol to 10mg QD                                  

 

                                                            . Decrease amlodopin

e to 1.25mg daily

Schedule with Cardiology

Fasting lab in AM                                  

 

                                                            . Resume Claritin PO

 daily

May take Ibuprofen PM at night for sleep

Continue Flonase 2 sprays each nostril bid

Complete prednisone 20 mg PO bid



                                  

 

                                                            . Continue exercise 

and current meds

See surgery for removal of right arm lesion                                  

 

                                                            . Saline nasal flush

es prn. Tylenol/Motrin prn headache.  

Notify if persists/symptoms worsening.

Add Veramyst

Increase Omeprazole to 20mg po BID                                  

 

                                                            . Nasocourt sample germania briggs.  Pt. will notify if symptoms are 

worse on Monday.                                   

 

                                                            . No NSAIDs

Kidney function discussed

Discussed hydration 

Monitor lab every 4months so will check CMP, HbA1C next month                   
              

 

                                                            . No NSAIDs

Kidney function discussed

Discussed hydration 

Monitor lab every 4months so will check CMP, HbA1C next month                   
              

 

                                                            . Vestibular exercis

es

Refill flonase

Strict low Na diet--discussed that needs to read labels

Rx for walker given

Has carotids and abdominal aorta and legs checked in January

Check Chem 7                                  

 

                                                            . Vestibular exercis

es

Refill flonase

Strict low Na diet--discussed that needs to read labels

Rx for walker with chair given due to muscle weakness/unsteadiness

Has carotids and abdominal aorta and legs checked in January

Check Chem 7

                                  

 

                                                            . Vestibular exercis

es

Refill flonase

Strict low Na diet--discussed that needs to read labels

Rx for walker given

Has carotids and abdominal aorta and legs checked in January

Check Chem 7                                  

 

                                                            . Vestibular exercis

es

Refill flonase

Strict low Na diet--discussed that needs to read labels

Rx for walker with chair given due to muscle weakness/unsteadiness

Has carotids and abdominal aorta and legs checked in January

Check Chem 7

                                  

 

                                                            . Injection to right

 SI joint as above

Continue Vimovo

Daily stretches

Pt will call at end of week to let us know how back is doing                    
             

 

                                                            . Cerumen flush with

 warm water and peroxide - good results 

Resume Nasonex nasal spray daily

Recommended Debrox earwax removal drops                                   

 

                                                            . Continue vimovo fo

r 1more week

Increase Omeprazole to BID for 1mo then resume QD if stomach improved

Vestibular exercises with meclizine q HS for next week                          
       

 

                                                            . Diabetic Diet

Accuchecks BID alternating times

Hold onglyza and Januvia for now and continue diet and exercise and weight loss 
and moniter BS

Discussed hypoglycemia and snack of peanut butter and crackers with juice or 
milk                                  

 

                                                            . Continue off caraf

ate and see how does

Continue probiotic

Add Vestibular exercises and use meclizine prn

Continue Nasonex                                  

 

                                                            Left ear flushed wit

h warm water and peroxide with ear 

syringe and then last removed with currette--peroxide drops instilled.  
Tolerated well, no complications, TM intact.. Continue off carafate and see how 
does

Continue probiotic

Add Vestibular exercises and use meclizine prn

Continue Nasonex

Check fasting lab including CMP, Lipids, CBC, TSH, Free T4, HbA1C               
                  

 

                                                            . Supportive care.  

Rest, Fluids, Tylenol/Motrin prn fever 

or bodyaches.  Notify if worsening symptoms.

                                  

 

                                                            . Continue carafate 

for 4more weeks at current dose then 

decrease to BID for 2wks then q HS                                  

 

                                                            . Decrease caffeine 

intake

Check Bilateral Mammogram with US of left breast                                
 

 

                                                            . Trial of Vimovo 50

/200mg po BID

Check UA                                  

 

                                                            . Flexeril and Vimov

o

OMT done

Daily stretches                                  

 

                                                            . Cerumen flush with

 warm water and peroxide - good results 

Resume Nasonex nasal spray daily

Recommended Debrox earwax removal drops                                   

 

                                                            . No caffeine, no ni

cotine, no mints, no late meals, elevate

HOB 30 degrees

Cont. with Nexium

See Card to discuss Hiatal Hernia Repair

B12 given                                  

 

                                                            .  Supportive care. 

 Rest, Fluids, Tylenol prn fever or 

bodyaches.  Notify if worsening symptoms.

Ceftin                                  

 

                                                            . Ceftin and continu

e claritin/flonase

Decrease amlodopine to 2.5mg q HS until fwup with Card due to weakness          
                       

 

                                                            . Check CMP, CBC, TS

H, Free T4, B12, Lipids, HbA1C

Discussed 6 small meals a day each with protein

Would likely benefit from antidepressant 

Update colonoscopy                                  

 

                                                            Informed Consent obt

ainded.  Right SI joint cleansed with 

alcohol and betadine and injected with 3cc 1%l lidocaine with 40mg depomedrol 
and 40mg kenalog, tolerated well with no complications, neosporin and bandage 
applied. Injection to Right SI joint as above

Pt will call in 3 days on pain

Has PT starting in 2wks.

## 2020-02-19 NOTE — XMS REPORT
CCD document using C-CDA

                             Created on: 2020



Leilani Ramírez

External Reference #: [id number]

: 1940

Sex: Female



Demographics





                          Address                   [address] [place]  [address]

 

                          Home Phone                [phone]

 

                          Preferred Language        Unknown

 

                          Marital Status            

 

                          Alevism Affiliation     Unknown

 

                          Race                      White

 

                          Ethnic Group              Not  or 





Author





                          Author                    Leilani Driver D.O.

 

                          Organization              [facility]

 

                          Address                   [address] [place]  [address]



 

                          Phone                     [phone]







Care Team Providers





                    Care Team Member Name Role                Phone

 

                    Akanksha Driver D.O. PP                  Unavailable

 

                          CCM                       Unavailable







Summary Purpose

Interface Exchange



Insurance Providers





             Payer name   Policy type / Coverage type Covered party ID Effective

 Begin Date 

Effective End Date

 

             [employer] [place] [id number]  2018   

  Unknown

 

             [employer] Senior Supplemental Medicare Part B [id number]   [id number]    

 Unknown







Family history





Father



                          Diagnosis                 Age At Onset

 

                          Heart disease             Unknown







Mother



                          Diagnosis                 Age At Onset

 

                          Heart disease             Unknown

 

                          Cancer                    Unknown







Social History





                Social History Element Codes           Description     Effective

 Dates

 

                Tobacco history SNOMED CT: 803575166 Never smoker    2011

 

                Marital status  Unknown         Single          2010

 

                Number of children Unknown         9               2010







Allergies, Adverse Reactions, Alerts





           Substance  Reaction   Codes      Entered Date Inactivated Date Status

 

           _                     Unknown    2019 No Inactive Date Active

 

           * NO KNOWN FOOD ALLERGIES            Unknown    2010 No Inactiv

e Date Active

 

           _                     Unknown    2010 No Inactive Date Active

 

           QUINIDINE-QUININE ANALOGUES hives,     Unknown    2010 No Inact

diamante Date Active







Problems





                Condition       Codes           Effective Dates Condition Status

 

                          Essential (primary) hypertension ICD-9: 401.9

ICD-10: I10               2014                Active

 

                          Palpitations              ICD-9: 785.1

ICD-10: R00.2             10/02/2018                Active

 

                          Stress reaction           ICD-9: 308.9

ICD-10: F43.0             2020                Active

 

                          Mixed hyperlipidemia      ICD-9: 272.4

ICD-10: E78.2             2019                Active

 

                          FLU VACCINE               ICD-9: V04.81

ICD-10: Z23               2012                Active

 

                          Acute serous otitis media, left ear ICD-9: 381.01

ICD-10: H65.02            2019                Active

 

                          Coronary atherosclerosis due to calcified coronary les

ion ICD-9: 414.00

ICD-10: I25.84            2018                Active

 

                          Hypoglycemia, unspecified ICD-9: 251.2

ICD-10: E16.2             2017                Active

 

                          Other fatigue             ICD-9: 780.79

ICD-10: R53.83            2017                Active

 

                          Urinary tract infection, site not specified ICD-9: 599

.0

ICD-10: N39.0             10/02/2018                Active

 

                          Gastro-esophageal reflux disease without esophagitis I

CD-9: 530.81

ICD-10: K21.9             2018                Active

 

                          Epigastric pain           ICD-9: 789.06

ICD-10: R10.13            2018                Active

 

                          Atherosclerotic heart disease of native coronary arter

y without angina pectoris 

ICD-9: 414.00

ICD-10: I25.10            2018                Active

 

                          Generalized anxiety disorder ICD-9: 300.00

ICD-10: F41.1             2018                Active

 

                          Dizziness and giddiness   ICD-9: 780.4

ICD-10: R42               2014                Active

 

                          Occlusion and stenosis of bilateral carotid arteries I

CD-9: 433.10

ICD-10: I65.23            2018                Active

 

                          Cervicalgia               ICD-9: 723.1

ICD-10: M54.2             2018                Active

 

                          Other spondylosis with radiculopathy, cervical region 

ICD-9: 721.0

ICD-10: M47.22            2018                Active

 

                          Cervicocranial syndrome   ICD-9: 723.2

ICD-10: M53.0             2018                Active

 

                          Vertigo of central origin, bilateral ICD-9: 386.2

ICD-10: H81.43            2018                Active

 

                          Benign paroxysmal vertigo, bilateral ICD-9: 386.11

ICD-10: H81.13            2018                Active

 

                          Otalgia, bilateral        ICD-9: 388.70

ICD-10: H92.03            2018                Active

 

                          Left lower quadrant pain  ICD-9: 789.04

ICD-10: R10.32            2017                Active

 

                          Low back pain             ICD-9: 724.2

ICD-10: M54.5             2017                Active

 

                          Radiculopathy, lumbosacral region ICD-9: 724.4

ICD-10: M54.17            2018                Active

 

                          Impaired fasting glucose  ICD-9: 790.29

ICD-10: R73.01            2012                Active

 

                          Other intervertebral disc degeneration, lumbar region 

ICD-9: 722.52

ICD-10: M51.36            2017                Active

 

                          Pain in thoracic spine    ICD-9: 724.1

ICD-10: M54.6             2017                Active

 

                          Mastodynia                ICD-9: 611.71

ICD-10: N64.4             2017                Active

 

                          Acute upper respiratory infection, unspecified ICD-9: 

465.9

ICD-10: J06.9             2017                Active

 

                          Acute mastoiditis without complications, right ear ICD

-9: 383.00

ICD-10: H70.001           2016                Active

 

                          Constipation, unspecified ICD-9: 564.00

ICD-10: K59.00            2016                Active

 

                          Chronic kidney disease, stage 1 ICD-9: 585.1

ICD-10: N18.1             03/15/2016                Active

 

                          History of falling        ICD-9: V15.88

ICD-10: Z91.81            12/15/2015                Active

 

                          Muscle weakness (generalized) ICD-9: 780.79

ICD-10: M62.81            2015                Active

 

                Acute renal failure ICD-9: 584.9    2015      Active

 

                POLYURIA        ICD-9: 788.42   2015      Active

 

                CAD             ICD-9: 414.00   09/10/2015      Active

 

                Hyperglycemia   ICD-9: 790.29   2012      Active

 

                HYPERLIPIDEMIA NEC/NOS ICD-9: 272.4    09/10/2015      Active

 

                ABDOMINAL PAIN  ICD-9: 789.00   10/10/2012      Active

 

                Grieving        ICD-9: 309.0    2015      Active

 

                HYPOGLYCEMIA    ICD-9: 251.2    2012      Active

 

                MALAISE AND FATIGUE ICD-9: 780.79   2015      Active

 

                CERUMEN IMPACTION ICD-9: 380.4    2015      Active

 

                Leg pain        ICD-9: 729.5    2015      Active

 

                SUPERFIC PHLEBITIS-LEG ICD-9: 451.0    2015      Active

 

                SPASM OF MUSCLE ICD-9: 728.85   2015      Active

 

                Thoracic back pain ICD-9: 724.1    2015      Active

 

                Benign positional vertigo ICD-9: 386.11   2015      Active

 

                Suspicious nevus ICD-9: 238.2    2015      Active

 

                EUSTACHIAN TUBE DYSFUNCTION ICD-9: 381.81   2014      Acti

ve

 

                SINUSITIS, ACUTE ICD-9: 461.9    2014      Active

 

                DIZZINESS/VERTIGO ICD-9: 780.4    2014      Active

 

                HYPERTENSION    ICD-9: 401.9    2014      Active

 

                URI, ACUTE      ICD-9: 465.9    10/15/2013      Active

 

                CEPHALGIA       ICD-9: 784.0    2013      Active

 

                OTITIS MEDIA NOS ICD-9: 382.9    2013      Active

 

                Perforation of ear drum ICD-9: 384.20   05/10/2013      Active

 

                Mastalgia       ICD-9: 611.71   2013      Active

 

                DYSPEPSIA       ICD-9: 536.8    10/10/2012      Active

 

                IBS             ICD-9: 564.1    10/10/2012      Active

 

                FLU VACCINE     ICD-9: V04.81   2012      Active

 

                Radiculopathy of leg ICD-9: 724.4    2012      Active

 

                SCIATICA        ICD-9: 724.3    2012      Active

 

                Lumbar degenerative disc disease ICD-9: 722.52   2012     

 Active

 

                Sacroiliac dysfunction ICD-9: 739.4    2012      Active

 

                Hypertension    Unknown         2012      Active

 

                PAIN, LOWER BACK ICD-9: 724.2    2011      Active

 

                SPINAL ENTHESOPATHY ICD-9: 720.1    2011      Active

 

                Hypotension     ICD-9: 458.9    2011      Active

 

                SACROILIITIS NEC ICD-9: 720.2    2011      Active

 

                PHARYNGITIS, ACUTE ICD-9: 462      2010      Active

 

                URINARY TRACT INFECTION ICD-9: 599.0    2010      Active

 

                Heat exhaustion ICD-9: 992.5    2010      Active

 

                Esophagitis     ICD-9: 530.10   2010      Active

 

                Gastritis       ICD-9: 535.50   2010      Active

 

                GERD            ICD-9: 530.81   2010      Active

 

                Hiatal hernia   ICD-9: 553.3    2010      Active

 

                B12 DEFIC ANEMIA NEC ICD-9: 281.1    2010      Active

 

                Anxiety         Unknown         2010      Active

 

                Heart disease   Unknown         2010      Active

 

                Hyperlipidemia  Unknown         2010      Active

 

                ANXIETY STATE NOS ICD-9: 300.00   2010      Active

 

                DEPRESSIVE DISORDER NEC ICD-9: 311      2010      Active







Medications





          Medication Codes     Instructions Start Date Stop Date Status    Fill 

Instructions

 

             simvastatin 40 mg tablet RxNorm: 385383 1 Tablet(s) Oral QD 

020   

10/19/2020                Active                     

 

                omeprazole 40 mg capsule,delayed release RxNorm: 120717  1 Capsu

le(s) Oral QD 

2020          10/19/2020          Active               

 

                    metoprolol tartrate 25 mg tablet RxNorm: 769260      1 Table

t(s) Oral QAM and two 

tablets (50mg) in the evening 2020      Active           

 

                omeprazole 40 mg capsule,delayed release RxNorm: 815159  1 Capsu

le(s) Oral QD 

2020          Inactive             

 

                    Xanax 0.25 mg tablet RxNorm: 665210      TAKE 1 TABLET BY [facility] TWICE DAILY 1 

Tablet(s) Oral two times a day as needed as needed for anxiety 2020                Inactive                   

 

             simvastatin 40 mg tablet RxNorm: 963676 1 Tablet(s) Oral QD 2020                Inactive                   

 

                    metoprolol tartrate 25 mg tablet RxNorm: 931439      1 Table

t(s) Oral QAM and two 

tablets (50mg) in the evening 2020      Inactive         

 

                    metoprolol tartrate 25 mg tablet RxNorm: 323623      1 Table

t(s) Oral QAM and two 

tablets (50mg) in the evening 2020      Inactive         

 

                    Flonase Allergy Relief 50 mcg/actuation nasal spray,suspensi

on RxNorm: 2971742     2

Spray Nasal every night at bedtime 2020      Inactive     

    

 

           simvastatin 40 mg tablet RxNorm: 643774 1 Tablet(s) PO QD 2019 

Inactive                                 

 

                omeprazole 40 mg capsule,delayed release RxNorm: 279737  1 Capsu

le(s) Oral QD 

2019          Inactive             

 

                Xanax 0.25 mg tablet RxNorm: 291150  TAKE 1 TABLET BY MOUTH TWIC

E DAILY 

10/29/2019          2020          Inactive             

 

                omeprazole 40 mg capsule,delayed release RxNorm: 907971  1 Capsu

le(s) PO QD 

10/07/2019          2019          Inactive             

 

             metoprolol tartrate 25 mg tablet RxNorm: 961869 1 Tablet(s) PO BID 

2019                Inactive                   

 

                omeprazole 40 mg capsule,delayed release RxNorm: 576595  1 Capsu

le(s) PO QD 

2019          10/06/2019          Inactive             

 

                    Flonase Allergy Relief 50 mcg/actuation nasal spray,suspensi

on RxNorm: 4727570     2

[place] [facility] 2019      Inactive         

 

           simvastatin 40 mg tablet RxNorm: 388664 1 Tablet(s) PO QD 2019 

Inactive                                 

 

           simvastatin 40 mg tablet RxNorm: 716877 1 Tablet(s) PO QD 2019 

Inactive                                 

 

                omeprazole 40 mg capsule,delayed release RxNorm: 058683  1 Capsu

le(s) PO QD 

2019          Inactive             

 

           simvastatin 40 mg tablet RxNorm: 256253 1 Tablet(s) PO QD 2019 

Inactive                                 

 

                omeprazole 40 mg capsule,delayed release RxNorm: 620993  1 Capsu

le(s) PO QD 

2019          Inactive             

 

             Bactrim  mg-160 mg tablet RxNorm: 122165 1 Tablet(s) PO BID 0

3/08/2019   

2019                Inactive                   

 

             Bactrim  mg-160 mg tablet RxNorm: 353313 1 Tablet(s) PO BID 0

3/08/2019   

2019                Inactive                   

 

             Macrobid 100 mg capsule RxNorm: 189195 1 Capsule(s) PO BID 20

                          Inactive                   

 

             metoprolol tartrate 25 mg tablet RxNorm: 927488 1 Tablet(s) PO BID 

2019                Inactive                   

 

                Xanax 0.25 mg tablet RxNorm: 076502  TAKE 1 TABLET BY MOUTH TWIC

E DAILY 

2018          10/28/2019          Inactive             

 

           Xanax 0.25 mg tablet RxNorm: 165653 1 Tablet(s) PO BID 2018 

Inactive                                 

 

                    Protonix 40 mg tablet,delayed release RxNorm: 548327      1 

Tablet(s) PO BID for 

stomach--replaces omeprazole 2018      Inactive         

 

             Carafate 1 gram tablet RxNorm: 151946 1 Tablet(s) PO AC & HS 2019                Inactive                   

 

           Xanax 0.25 mg tablet RxNorm: 281583 1 Tablet(s) PO BID 10/30/2018 12/

10/2018 

Inactive                                 

 

             Bactrim  mg-160 mg tablet RxNorm: 473437 1 Tablet(s) PO BID 1

   

10/08/2018                Inactive                   

 

             Bactrim  mg-160 mg tablet RxNorm: 314555 1 Tablet(s) PO BID 1

   

10/03/2018                Inactive                   

 

             Carafate 1 gram tablet RxNorm: 828715 1 Tablet(s) PO AC & HS 09/18/

2018   

10/17/2018                Inactive                   

 

                    Protonix 40 mg tablet,delayed release RxNorm: 651795      1 

Tablet(s) PO BID for 

stomach--replaces omeprazole 2018      Inactive         

 

                    Protonix 40 mg tablet,delayed release RxNorm: 314783      1 

Tablet(s) PO BID for 

stomach--replaces omeprazole 2018      Inactive         

 

                    fluticasone 50 mcg/actuation nasal spray,suspension RxNorm: 

9883016     2 Spray 

NASAL QD to each nostril 2018      Inactive         

 

             Nasonex 50 mcg/actuation Spray RxNorm: 4848607 2 [place] [facility] 2018                Inactive                   

 

                omeprazole 40 mg capsule,delayed release RxNorm: 717219  1 Capsu

le(s) PO QD 

2018          08/15/2018          Inactive             

 

                    simvastatin 40 mg tablet RxNorm: 612092      1 Tablet(s) PO 

QD TAKE 1 TABLET EVERY 

DAY             2018      Inactive         

 

             metoprolol tartrate 25 mg tablet RxNorm: 394926 1/2 Tablet(s) PO QD

 2018                Inactive                   

 

                simvastatin 40 mg tablet RxNorm: 260943  Tablet(s) TAKE 1 TABLET

 EVERY DAY 

2017          Inactive             

 

             metoprolol tartrate 25 mg tablet RxNorm: 131557 1/2 Tablet(s) PO QD

 2017                Inactive                   

 

                    Flonase 50 mcg/actuation nasal spray,suspension RxNorm: 1797

933     2 [place] [facility] 

BID             2017      Inactive         

 

                omeprazole 40 mg capsule,delayed release RxNorm: 222614  1 Capsu

le(s) PO QD 

2017          Inactive             

 

             metoprolol tartrate 25 mg tablet RxNorm: 168234 1/2 Tablet(s) PO QD

 04/10/2017   

2017                Inactive                   

 

                    ciprofloxacin 0.2 % ear drops in a dropperette RxNorm: 72040

6      4 Drop(s) OTIC TID

for 1 week      2016      Inactive         

 

           cefdinir 300 mg capsule RxNorm: 882257 2 Capsule(s) PO QD 2016 

Inactive                                 

 

                    omeprazole 40 mg capsule,delayed release RxNorm: 429194     

 TAKE 1 CAPSULE EVERY DAY

                2016      Inactive         

 

             simvastatin 40 mg tablet RxNorm: 137624 TAKE 1 TABLET EVERY DAY 2017                Inactive                   

 

                    Flonase 50 mcg/actuation nasal spray,suspension RxNorm: 1797

933     2 [place] [facility] 

BID             2015      Inactive         

 

             cefuroxime axetil 250 mg tablet RxNorm: 549791 1 Tablet(s) PO BID 0

2015                Inactive                   

 

             cefuroxime axetil 250 mg tablet RxNorm: 826368 1 Tablet(s) PO BID 0

2015                Inactive                   

 

                omeprazole 40 mg capsule,delayed release RxNorm: 934210  1 Capsu

le(s) PO QD 

2015          Inactive             

 

           meloxicam 7.5 mg tablet RxNorm: 794611 1 Tablet(s) PO QD 2015 0

2015 

Inactive                                 

 

                loratadine 10 mg tablet RxNorm: 746301  1 Tablet(s) PO QAM for a

llergies 

2014          Inactive             

 

                    Flonase 50 mcg/actuation nasal spray,suspension RxNorm: 8963

23      2 [place] [facility] BID

                2014      12/15/2015      Inactive         

 

           prednisone 20 mg tablet RxNorm: 555479 2 Tablet(s) PO BID 2014 

Inactive                                 

 

             Dyazide 37.5 mg-25 mg capsule RxNorm: 021148 1 Capsule(s) PO QAM 2014                Inactive                   

 

           Ceftin 500 mg tablet RxNorm: 932897 1 Tablet(s) PO BID 2014 

Inactive                                 

 

           Ceftin 500 mg tablet RxNorm: 014221 1 Tablet(s) PO BID 2014 

Inactive                                 

 

                    simvastatin 40 mg tablet RxNorm: 984199      Tablet(s) PO TA

KE ONE TABLET BY MOUTH 

EVERY DAY       2014      Inactive         

 

                    metoprolol tartrate 25 mg tablet RxNorm: 771632      Tablet(

s) PO TAKE ONE-HALF 

TABLET BY MOUTH EVERY DAY 2014      Inactive         

 

           Ceftin 500 mg tablet RxNorm: 979022 1 Tablet(s) PO BID 10/15/2013 10/

 

Inactive                                 

 

                loratadine 10 mg tablet RxNorm: 241192  1 Tablet(s) PO QAM for a

llergies 

2013          Inactive             

 

                    omeprazole 20 mg capsule,delayed release RxNorm: 211827     

 Capsule(s) PO TAKE ONE 

CAPSULE BY MOUTH TWICE DAILY 2013      Inactive         

 

                omeprazole 20 mg capsule,delayed release RxNorm: 349701  1 Capsu

le(s) PO BID 

2013          Inactive             

 

             Augmentin 875 mg-125 mg tablet RxNorm: 591311 1 Tablet(s) PO Q12H 0

5/10/2013   

2013                Inactive                   

 

             Floxin Otic Drops 1 bottle Drops RxNorm:      5 Drop(s) OTIC BID 05

/10/2013   

2013                Inactive                   

 

                    metoprolol tartrate 25 mg tablet RxNorm: 648167      Tablet(

s) PO TAKE ONE-HALF 

TABLET BY MOUTH EVERY DAY 2013      Inactive         

 

                    simvastatin 40 mg tablet RxNorm: 312962      Tablet(s) PO TA

KE ONE TABLET BY MOUTH 

EVERY DAY       2013      Inactive         

 

                lancets         RxNorm:         Misc Miscellaneous USE ONE TO CH

YOGI GLUCOSE EVERY DAY 

2013          Inactive             

 

             Carafate 1 gram tablet RxNorm: 311964 1 Tablet(s) PO AC & HS 2015                Inactive                   

 

           Flagyl 500 mg tablet RxNorm: 034493 1 Tablet(s) PO TID 10/10/2012 10/

 

Inactive                                 

 

             Carafate 1 gram tablet RxNorm: 263275 1 Tablet(s) PO AC & HS 10/10/

2012   

2012                Inactive                   

 

          One Touch Test strips RxNorm:   Miscellaneous QD 2012

 Inactive  

one touch ultra mini test strips

 

           Protonix 40 mg Tab RxNorm: 713684 1 Tablet(s) PO QD 2012 

Inactive                                 

 

           Protonix 40 mg Tab RxNorm: 959206 1 Tablet(s) PO QD 2012 10/09/

2012 

Inactive                                 

 

           prednisone 20 mg Tab RxNorm: 198912 1 Tablet(s) PO BID 2012 

Inactive                                 

 

             Flexeril 5 mg Tab RxNorm: 097939 1 Tablet(s) PO QHS for spasm 2012                Inactive                   

 

             Flexeril 5 mg Tab RxNorm: 563528 1 Tablet(s) PO QHS for spasm 2012                Inactive                   

 

                amlodipine 2.5 mg Tab RxNorm: 878254  1/2 Tablet(s) PO QD replac

es 5mg dose 

2012          Inactive             

 

           simvastatin 40 mg tablet RxNorm: 049266 1 Tablet(s) PO QD 2012 

Inactive                                 

 

             metoprolol tartrate 25 mg tablet RxNorm: 400881 1/2 Tablet(s) PO QD

 2012                Inactive                   

 

                omeprazole 20 mg capsule,delayed release RxNorm: 411525  1 Capsu

le(s) PO BID 

2012          Inactive             

 

           cefdinir 300 mg Cap RxNorm: 631342 2 Capsule(s) PO QD 2011 

Inactive                                 

 

           simvastatin 40 mg Tab RxNorm: 917735 1 Tablet(s) PO QD 2011 

Inactive                                 

 

             metoprolol tartrate 25 mg Tab RxNorm: 252619 1/2 Tablet(s) PO QD 10

/   

2012                Inactive                   

 

             metoprolol tartrate 25 mg Tab RxNorm: 055626 1/2 Tablet(s) PO QD 10

/   

10/24/2011                Inactive                   

 

           meclizine 25 mg Tab RxNorm: 7559363 1 Tablet(s) PO QHS 2011 

Inactive                                for dizziness

 

           Ceftin 500 mg Tab RxNorm: 270478 1 Tablet(s) PO BID 2011 

Inactive                                 

 

                omeprazole 20 mg Cap, Delayed Release RxNorm: 531720  1 Capsule(

s) PO BID 

2011          10/24/2011          Inactive             

 

           simvastatin 40 mg Tab RxNorm: 656394 1 Tablet(s) PO QD 2011 

Inactive                                 

 

           simvastatin 40 mg Tab RxNorm: 065020 1 Tablet(s) PO QD 2011 

Inactive                                 

 

           simvastatin 40 mg Tab RxNorm: 042588 1 Tablet(s) PO QD 2010 

Inactive                                 

 

             Tessalon Perles 100 mg Cap RxNorm: 142377 1 Capsule(s) PO Q6-8H 2010                Inactive                   

 

           cefdinir 300 mg Cap RxNorm: 324836 1 Capsule(s) PO BID 2010 

Inactive                                 

 

           Lexapro 10 mg Tab RxNorm: 957621 1 Tablet(s) PO QD 2010

010 

Inactive                                 

 

           Cipro 250 mg Tab RxNorm: 138622 1 Tablet(s) PO BID 2010 10/04/2

010 

Inactive                                 

 

             Wellbutrin  mg 24 hr Tab RxNorm: 712706 1 Tablet(s) PO QAM 2010                Inactive                   

 

          Fish Oil 1,000 mg Cap RxNorm:   1 Capsule(s) PO QD No Start Date      

     Active     

 

                Tylenol Extra Strength 500 mg tablet RxNorm: 493832  1/2 Tablet(

s) PO as needed 

No Start Date                           Active               

 

                nitroglycerin 0.4 mg sublingual tablet RxNorm: 312084  Tablet(s)

 SL as needed No 

Start Date                              Active               

 

                    Calcium with Vitamin D 600 mg (1,500 mg)-400 unit tablet RxN

orm: 518292      1 

Tablet(s) PO QD No Start Date                   Active           

 

           Multivitamin & Mineral Formula Tab RxNorm:    1 Tablet(s) PO QD No St

art Date            

Active                                   

 

          vitamin B complex capsule RxNorm:   1 Capsule(s) PO QD No Start Date  

         Active     

 

          Aspirin 81 mg Tab RxNorm: 717683 1 Tablet(s) PO QD No Start Date      

     Active     

 

                    isosorbide mononitrate ER 30 mg tablet,extended release 24 h

r RxNorm: 917457      1 

Tablet(s) PO QHS No Start Date                   Active           

 

           Vitamin D 1,000 unit Cap RxNorm: 592258 1 Capsule(s) PO QD No Start D

ate            

Active                                   

 

          Co Q-10 oral RxNorm: 05033 oral      No Start Date           Active   

  

 

             metoprolol tartrate 25 mg Tab RxNorm: 477062 1/2 Tablet(s) PO QD No

 Start Date 

10/23/2011                Inactive                   

 

             Nasonex 50 mcg/actuation Spray RxNorm: 6494632 2 Spray NASAL No Sta

rt Date 

2018                Inactive                   

 

           Vitamin B12 1000mcg Tablet RxNorm:    1 Tablet(s) PO QD No Start Date

 2015 

Inactive                                 

 

           amlodipine 5 mg Tab RxNorm: 250815 1 Tablet(s) PO QD No Start Date  

Inactive                                 

 

             simvastatin 40 mg tablet RxNorm: 191054 1 Tablet(s) PO QD No Start 

Date 

2019                Inactive                   

 

                omeprazole 20 mg capsule,delayed release RxNorm: 038330  1 Capsu

le(s) PO BID No 

Start Date          2013          Inactive             

 

                omeprazole 40 mg capsule,delayed release RxNorm: 982896  1 Capsu

le(s) PO QD No 

Start Date          2019          Inactive             

 

           Nexium 40 mg Cap RxNorm: 067329 1 Capsule(s) PO QD No Start Date  

Inactive                                 

 

             Stool Softener 100 mg Tab RxNorm: 6709692 2 Tablet(s) PO BID No Sta

rt Date 

2012                Inactive                   

 

                    Calcium with Vitamin D 600 mg (1,500 mg)-400 unit Tab RxNorm

: 557042      1 Tablet(s)

PO QD           No Start Date   2015      Inactive         

 

             iron 325 mg (65 mg iron) Tab RxNorm: 966837 1 Tablet(s) PO QD No St

art Date 

2015                Inactive                   

 

                Flonase 50 mcg/actuation Nasal Spray RxNorm: 699896  2 [place] ROZ

AL BID No Start 

Date                2014          Inactive             

 

                    fluticasone 50 mcg/actuation nasal spray,suspension RxNorm: 

1626750     2 Spray 

NASAL QD to each nostril No Start Date   2018      Inactive         

 

             Nasonex 50 mcg/actuation Spray RxNorm: 6732389 2 Spray NASAL QD No 

Start Date 

2018                Inactive                   

 

                    hydralazine 50 mg tablet RxNorm: 116094      1 Tablet(s) PO 

as needed for BP over 

160/90          No Start Date   2018      Inactive         

 

             Vimovo 500 mg-20 mg 12 hr Tab RxNorm: 499609 1 Tablet(s) PO BID No 

Start Date 

2011                Inactive                   

 

             Tylenol PM 25 mg-500 mg/15 mL Oral Soln RxNorm: 7622284 1 PO QPM   

  No Start Date 

2015                Inactive                   

 

          Iron (Ferrous Sulfate) Oral RxNorm:   Oral      No Start Date 20

12 Inactive   

 

             Reglan 10 mg Tab RxNorm: 109413 1 Tablet(s) PO TID before meals No 

Start Date 

2011                Inactive                   

 

           Xanax 1 mg Tab RxNorm: 498847 1/2 Tablet(s) PO QD No Start Date  

Inactive                                 

 

             amlodipine 10 mg tablet RxNorm: 804492 1 Tablet(s) PO QD No Start D

ate 

2014                Inactive                   

 

          Iron (dried) Oral RxNorm:   Oral      No Start Date 2012 Inactiv

e   

 

                metoprolol tartrate 50 mg tablet RxNorm: 960310  1 Tablet(s) PO 

BID No Start Date

                    12/10/2018          Inactive             

 

           Xanax 0.25 mg tablet RxNorm: 090410 1 Tablet(s) PO BID No Start Date 

10/29/2018 

Inactive                                 

 

                lancets         RxNorm:         Miscellaneous check blood sugar 

at least once daily No Start 

Date                2013          Inactive             

 

           simvastatin 40 mg Tab RxNorm: 698787 1 Tablet(s) PO QD No Start Date 

2010 

Inactive                                 

 

                    isosorbide mononitrate ER 30 mg tablet,extended release 24 h

r RxNorm: 686892      1 

Tablet(s) PO QHS No Start Date   2019      Inactive         

 

             amlodipine 2.5 mg tablet RxNorm: 999469 1 Tablet(s) PO QD No Start 

Date 

2014                Inactive                   

 

             sucralfate 1 gram tablet RxNorm: 353322 1 Tablet(s) PO QID No Start

 Date 

2019                Inactive                   

 

          Fish Oil Oral RxNorm:   Oral      No Start Date 2012 Inactive   

 

          Multiple Vitamin Oral RxNorm:   Oral      No Start Date 2012 [facility]

ctive   

 

                metoprolol tartrate 25 mg tablet RxNorm: 202432  1/2 Tablet(s) P

O QD No Start 

Date                2017          Inactive             

 

                metoprolol tartrate 50 mg tablet RxNorm: 089312  1/2 Tablet(s) P

O BID No Start 

Date                2019          Inactive             

 

                    Flonase Allergy Relief 50 mcg/actuation nasal spray,suspensi

on RxNorm: 3784091     2

[place] NASAL QHS No Start Date   2019      Inactive         







Medication Administered

No Medication Administered data



Immunizations





                Vaccine         Codes           Date            Status

 

                Influenza       CVX: 135        10/22/2019      Complete

 

                Influenza       CVX: 135        10/22/2019      Complete

 

                Influenza       CVX: 135        2018      Complete

 

                Influenza       CVX: 135        10/06/2017      Complete

 

                Influenza       CVX: 135        10/07/2016      Complete

 

                Influenza       CVX: 135        10/16/2015       

 

                Influenza       CVX: 141        2013       

 

                Influenza (Adult) CVX: 141        10/06/2010       







Results





          Observation Observation Code Item      Item Code Result    Date      S

[facility] Location

 

          COMPLETE BLOOD COUNT [id number]   WBC                 7.1 10e9/L 20

20 Unknown

 

          COMPLETE BLOOD COUNT [id number]   RBC                 4.16 10e12/L 2020 Unknown

 

          COMPLETE BLOOD COUNT [id number]   HEMOGLOBIN           12.4 g/dL 20

20 Unknown

 

          COMPLETE BLOOD COUNT [id number]   HEMATOCRIT           39.3 %    20

20 Unknown

 

          COMPLETE BLOOD COUNT [id number]   MCV                 94.5 fL   

0 Unknown

 

          COMPLETE BLOOD COUNT [id number]   MCH                 29.8 pg   

0 Unknown

 

          COMPLETE BLOOD COUNT 7563332   MCHC                31.6 g/dL 

0 Unknown

 

          COMPLETE BLOOD COUNT [id number]   PLATELET COUNT           161 10e9/L 2020 Unknown

 

          COMPLETE BLOOD COUNT [id number]   Mean Plt Volume           10.8 fL   2020 Unknown

 

          COMPLETE BLOOD COUNT [id number]   Neut Auto           38.7 %    

0 Unknown

 

          COMPLETE BLOOD COUNT [id number]   Lymph Auto           48.0 %    20

20 Unknown

 

          COMPLETE BLOOD COUNT [id number]   Mono Auto           9.5 %     

0 Unknown

 

          COMPLETE BLOOD COUNT [id number]   Eos Auto            3.2 %     

0 Unknown

 

          COMPLETE BLOOD COUNT [id number]   RDW                 13.5 %    

0 Unknown

 

          COMPLETE BLOOD COUNT [id number]   Baso Auto           0.6 %     

0 Unknown

 

          COMPLETE BLOOD COUNT [id number]   Neutrophil Abs           2.75 10e9/L  Unknown

 

          COMPLETE BLOOD COUNT [id number]   Lymphocyte Abs           3.41 10e9/L  Unknown

 

          COMPLETE BLOOD COUNT [id number]   Monocyte Abs           0.67 10e9/L 2020 Unknown

 

          COMPLETE BLOOD COUNT [id number]   Eosinophil Abs           0.23 10e9/L  Unknown

 

          COMPLETE BLOOD COUNT [id number]   Basophil Abs           0.04 10e9/L 2020 Unknown

 

          COMPLETE BLOOD COUNT [id number]   RDW-SD              44.7 fL   

0 Unknown

 

          THYROID STIMULATING HORMONE 39565     TSH                 1.677 uIU/mL

 2020 Unknown

 

          COMPREHENSIVE METABOLIC 65872     AST                 19 U/L    2020 Unknown

 

          COMPREHENSIVE METABOLIC 08775     ALT                 12 U/L    2020 Unknown

 

          COMPREHENSIVE METABOLIC 86067     BUN                 17 mg/dL  2020 Unknown

 

          COMPREHENSIVE METABOLIC 02185     ALBUMIN             4.2 g/dL  2020 Unknown

 

          COMPREHENSIVE METABOLIC 14563     CHLORIDE            103 mmol/L  Unknown

 

          COMPREHENSIVE METABOLIC 90356     Bili Total           0.2 mg/dL  Unknown

 

          COMPREHENSIVE METABOLIC 35997     ALK PHOS            61 U/L    2020 Unknown

 

          COMPREHENSIVE METABOLIC 84930     SODIUM              140 mmol/L  Unknown

 

          COMPREHENSIVE METABOLIC 07622     CREATININE           0.95 mg/dL 2020 Unknown

 

          COMPREHENSIVE METABOLIC 27415     CALCIUM             9.4 mg/dL 2020 Unknown

 

          COMPREHENSIVE METABOLIC 41751     POTASSIUM           4.2 mmol/L  Unknown

 

          COMPREHENSIVE METABOLIC 91307     Total Protein           6.5 g/dL   Unknown

 

          COMPREHENSIVE METABOLIC 79202     Glucose             103 mg/dL 2020 Unknown

 

          COMPREHENSIVE METABOLIC 12243     Bicarbonate           26 mmol/L 2020 Unknown

 

          COMPREHENSIVE METABOLIC 60270     AGAP                11 mmol/L 2020 Unknown

 

          GFR CALC  9200323   GFR Non Afr Amr           57 mL/min 2020 Unk

nown

 

          GFR CALC  5437510   GFR Afr Amr           >60 mL/min 2020 Unknow

n

 

          GFR CALC  3155481   GFR Afr Amr           >60 mL/min 10/11/2019 Unknow

n

 

          GFR CALC  0841196   GFR Non Afr Amr           52 mL/min 10/11/2019 Unk

nown

 

          COMPREHENSIVE METABOLIC 12194     AST                 17 U/L    10/11/

2019 Unknown

 

          COMPREHENSIVE METABOLIC 82689     ALT                 11 U/L    10/11/

2019 Unknown

 

          COMPREHENSIVE METABOLIC 49623     BUN                 17 mg/dL  10/11/

2019 Unknown

 

          COMPREHENSIVE METABOLIC 94882     ALBUMIN             3.9 g/dL  10/11/

2019 Unknown

 

          COMPREHENSIVE METABOLIC 41906     CHLORIDE            108 mmol/L 10/11

/2019 Unknown

 

          COMPREHENSIVE METABOLIC 54888     Bili Total           0.5 mg/dL 10/11

/2019 Unknown

 

          COMPREHENSIVE METABOLIC 95104     ALK PHOS            72 U/L    10/11/

2019 Unknown

 

          COMPREHENSIVE METABOLIC 66320     SODIUM              142 mmol/L 10/11

/2019 Unknown

 

          COMPREHENSIVE METABOLIC 82175     CREATININE           1.02 mg/dL 10/1

2019 Unknown

 

          COMPREHENSIVE METABOLIC 54600     CALCIUM             9.3 mg/dL 10/11/

2019 Unknown

 

          COMPREHENSIVE METABOLIC 34404     POTASSIUM           4.0 mmol/L 10/11

/2019 Unknown

 

          COMPREHENSIVE METABOLIC 46866     Total Protein           6.2 g/dL  10

/2019 Unknown

 

          COMPREHENSIVE METABOLIC 98821     Glucose             93 mg/dL  10/11/

2019 Unknown

 

          COMPREHENSIVE METABOLIC 72020     Bicarbonate           27 mmol/L 10/1

2019 Unknown

 

          COMPREHENSIVE METABOLIC 52995     AGAP                7 mmol/L  10/11/

2019 Unknown

 

          GFR CALC  7001274   GFR Non Afr Amr           48 mL/min 06/10/2019 Unk

nown

 

          GFR CALC  6340957   GFR Afr Amr           59 mL/min 06/10/2019 Unknown

 

          THYROID STIMULATING HORMONE 04783     TSH                 1.936 uIU/mL

 06/10/2019 Unknown

 

          COMPREHENSIVE METABOLIC 41416     AST                 18 U/L    06/10/

2019 Unknown

 

          COMPREHENSIVE METABOLIC 32705     ALT                 11 U/L    06/10/

2019 Unknown

 

          COMPREHENSIVE METABOLIC 23970     BUN                 18 mg/dL  06/10/

2019 Unknown

 

          COMPREHENSIVE METABOLIC 43886     ALBUMIN             4.2 g/dL  06/10/

2019 Unknown

 

          COMPREHENSIVE METABOLIC 21653     CHLORIDE            109 mmol/L 06/10

/2019 Unknown

 

          COMPREHENSIVE METABOLIC 55639     Bili Total           0.4 mg/dL 06/10

/2019 Unknown

 

          COMPREHENSIVE METABOLIC 25175     ALK PHOS            64 U/L    06/10/

2019 Unknown

 

          COMPREHENSIVE METABOLIC 84000     SODIUM              142 mmol/L 06/10

/2019 Unknown

 

          COMPREHENSIVE METABOLIC 71363     CREATININE           1.09 mg/dL  Unknown

 

          COMPREHENSIVE METABOLIC 26295     CALCIUM             9.3 mg/dL 06/10/

2019 Unknown

 

          COMPREHENSIVE METABOLIC 34726     POTASSIUM           4.7 mmol/L 06/10

/2019 Unknown

 

          COMPREHENSIVE METABOLIC 38162     Total Protein           6.3 g/dL  06

/10/2019 Unknown

 

          COMPREHENSIVE METABOLIC 83738     Glucose             84 mg/dL  06/10/

2019 Unknown

 

          COMPREHENSIVE METABOLIC 26520     Bicarbonate           25 mmol/L  Unknown

 

          COMPREHENSIVE METABOLIC 56276     AGAP                8 mmol/L  06/10/

2019 Unknown

 

          COMPLETE BLOOD COUNT [id number]   WBC                 7.8 10e9/L 06/10/20

19 Unknown

 

          COMPLETE BLOOD COUNT [id number]   RBC                 4.04 10e12/L 06/10/

2019 Unknown

 

          COMPLETE BLOOD COUNT [id number]   HEMOGLOBIN           12.2 g/dL 06/10/20

19 Unknown

 

          COMPLETE BLOOD COUNT [id number]   HEMATOCRIT           38.6 %    06/10/20

19 Unknown

 

          COMPLETE BLOOD COUNT [id number]   MCV                 95.5 fL   06/10/201

9 Unknown

 

          COMPLETE BLOOD COUNT [id number]   MCH                 30.2 pg   06/10/201

9 Unknown

 

          COMPLETE BLOOD COUNT [id number]   MCHC                31.6 g/dL 06/10/201

9 Unknown

 

          COMPLETE BLOOD COUNT [id number]   PLATELET COUNT           215 10e9/L 06/

10/2019 Unknown

 

          COMPLETE BLOOD COUNT [id number]   Mean Plt Volume           11.2 fL   06/

10/2019 Unknown

 

          COMPLETE BLOOD COUNT [id number]   Neut Auto           45.7 %    06/10/201

9 Unknown

 

          COMPLETE BLOOD COUNT [id number]   Lymph Auto           42.1 %    06/10/20

19 Unknown

 

          COMPLETE BLOOD COUNT [id number]   Mono Auto           9.2 %     06/10/201

9 Unknown

 

          COMPLETE BLOOD COUNT [id number]   RDW                 13.4 %    06/10/201

9 Unknown

 

          COMPLETE BLOOD COUNT [id number]   Eos Auto            2.7 %     06/10/201

9 Unknown

 

          COMPLETE BLOOD COUNT [id number]   Baso Auto           0.3 %     06/10/201

9 Unknown

 

          COMPLETE BLOOD COUNT [id number]   Neutrophil Abs           3.56 10e9/L 06

/10/2019 Unknown

 

          COMPLETE BLOOD COUNT [id number]   Lymphocyte Abs           3.28 10e9/L 06

/10/2019 Unknown

 

          COMPLETE BLOOD COUNT [id number]   Monocyte Abs           0.72 10e9/L  Unknown

 

          COMPLETE BLOOD COUNT [id number]   Eosinophil Abs           0.21 10e9/L 06

/10/2019 Unknown

 

          COMPLETE BLOOD COUNT [id number]   RDW-SD              44.9 fL   06/10/201

9 Unknown

 

          COMPLETE BLOOD COUNT [id number]   Basophil Abs           0.02 10e9/L  Unknown

 

          COMPLETE BLOOD COUNT [id number]   WBC                 5.2 10e9/L 20

18 Unknown

 

          COMPLETE BLOOD COUNT [id number]   RBC                 4.21 10e12/L 2018 Unknown

 

          COMPLETE BLOOD COUNT [id number]   HEMOGLOBIN           12.8 g/dL 20

18 Unknown

 

          COMPLETE BLOOD COUNT [id number]   HEMATOCRIT           39.0 %    20

18 Unknown

 

          COMPLETE BLOOD COUNT [id number]   MCV                 92.6 fL   

8 Unknown

 

          COMPLETE BLOOD COUNT [id number]   MCH                 30.4 pg   

8 Unknown

 

          COMPLETE BLOOD COUNT [id number]   MCHC                32.8 g/dL 

8 Unknown

 

          COMPLETE BLOOD COUNT [id number]   PLATELET COUNT           202 10e9/L  Unknown

 

          COMPLETE BLOOD COUNT [id number]   Mean Plt Volume           10.7 fL    Unknown

 

          COMPLETE BLOOD COUNT [id number]   Neut Auto           40.9 %    

8 Unknown

 

          COMPLETE BLOOD COUNT [id number]   Lymph Auto           46.3 %    20

18 Unknown

 

          COMPLETE BLOOD COUNT [id number]   Mono Auto           9.3 %     

8 Unknown

 

          COMPLETE BLOOD COUNT [id number]   RDW                 13.7 %    

8 Unknown

 

          COMPLETE BLOOD COUNT [id number]   Eos Auto            2.9 %     

8 Unknown

 

          COMPLETE BLOOD COUNT [id number]   Baso Auto           0.6 %     

8 Unknown

 

          COMPLETE BLOOD COUNT [id number]   Neutrophil Abs           2.13 10e9/L  Unknown

 

          COMPLETE BLOOD COUNT [id number]   Lymphocyte Abs           2.41 10e9/L  Unknown

 

          COMPLETE BLOOD COUNT [id number]   Monocyte Abs           0.48 10e9/L  Unknown

 

          COMPLETE BLOOD COUNT [id number]   Eosinophil Abs           0.15 10e9/L  Unknown

 

          COMPLETE BLOOD COUNT [id number]   RDW-SD              45.2 fL   

8 Unknown

 

          COMPLETE BLOOD COUNT [id number]   Basophil Abs           0.03 10e9/L  Unknown

 

          [place] [address]     Glucose             118 mg/dL  Unknown

 

          [place] [address]     CREATININE           1.01 mg/dL  Unknown

 

          [place] [address]     BUN                 14 mg/dL   Unknown

 

          [place] [address]     SODIUM              141 mmol/L  Unknown

 

          [place] [address]     POTASSIUM           4.0 mmol/L  Unknown

 

          [place] [address]     CHLORIDE            108 mmol/L  Unknown

 

          [place] [address]     Bicarbonate           25 mmol/L  Unknown

 

          [place] [address]     AGAP                8 mmol/L   Unknown

 

          [place] [address]     CALCIUM             9.6 mg/dL  Unknown

 

          FREE T4   00924     T4 Free             1.23 ng/dL 2018 Unknown

 

          GFR CALC  7249770   GFR Afr Amr           >60 mL/min 2018 Unknow

n

 

          GFR CALC  9077676   GFR Non Afr Amr           53 mL/min 2018 Unk

nown

 

          THYROID STIMULATING HORMONE 96894     TSH                 2.124 uIU/mL

 2018 Unknown

 

          LIPID GROUP [id number]     Cholesterol           152 mg/dL 2018 Unkno

wn

 

          LIPID GROUP 35641     Triglyceride           151 mg/dL 2018 Unkn

own

 

          LIPID GROUP 62336     HDL CHOLESTEROL           47 mg/dL  2018 U

nknown

 

          LIPID GROUP 83185     Chol/HDL Ratio           3.23 ratio 2018 U

nknown

 

          LIPID GROUP 96145     NON-HDL Chol           105 mg/dL 2018 Unkn

own

 

          LIPID GROUP 51713     LDL Cholesterol           75 mg/dL  2018 U

nknown

 

          ASSAY OF TROPONIN QUANT 12186     Troponin-I           <0.30 ng/mL  Unknown

 

          COMPREHENSIVE METABOLIC 99190     AST                 20 U/L    2018 Unknown

 

          COMPREHENSIVE METABOLIC 50128     ALT                 14 U/L    2018 Unknown

 

          COMPREHENSIVE METABOLIC 31713     BUN                 19 mg/dL  2018 Unknown

 

          COMPREHENSIVE METABOLIC 34901     ALBUMIN             4.2 g/dL  2018 Unknown

 

          COMPREHENSIVE METABOLIC 32210     CHLORIDE            102 mmol/L  Unknown

 

          COMPREHENSIVE METABOLIC 83273     Bili Total           0.4 mg/dL  Unknown

 

          COMPREHENSIVE METABOLIC 90801     ALK PHOS            66 U/L    2018 Unknown

 

          COMPREHENSIVE METABOLIC 07119     SODIUM              135 mmol/L  Unknown

 

          COMPREHENSIVE METABOLIC 12136     CREATININE           1.01 mg/dL  Unknown

 

          COMPREHENSIVE METABOLIC 72925     CALCIUM             9.3 mg/dL 2018 Unknown

 

          COMPREHENSIVE METABOLIC 64923     POTASSIUM           4.8 mmol/L  Unknown

 

          COMPREHENSIVE METABOLIC 69393     Total Protein           7.0 g/dL   Unknown

 

          COMPREHENSIVE METABOLIC 37011     Glucose             91 mg/dL  2018 Unknown

 

          COMPREHENSIVE METABOLIC 57405     Bicarbonate           23 mmol/L  Unknown

 

          COMPREHENSIVE METABOLIC 01503     AGAP                10 mmol/L 2018 Unknown

 

          COMPLETE BLOOD COUNT [id number]   WBC                 7.5 10e9/L 20

18 Unknown

 

          COMPLETE BLOOD COUNT [id number]   RBC                 4.13 10e12/L 2018 Unknown

 

          COMPLETE BLOOD COUNT [id number]   HEMOGLOBIN           12.6 g/dL 20

18 Unknown

 

          COMPLETE BLOOD COUNT [id number]   HEMATOCRIT           38.4 %    20

18 Unknown

 

          COMPLETE BLOOD COUNT [id number]   MCV                 93.0 fL   

8 Unknown

 

          COMPLETE BLOOD COUNT [id number]   MCH                 30.5 pg   

8 Unknown

 

          COMPLETE BLOOD COUNT [id number]   MCHC                32.8 g/dL 

8 Unknown

 

          COMPLETE BLOOD COUNT [id number]   PLATELET COUNT           204 10e9/L  Unknown

 

          COMPLETE BLOOD COUNT [id number]   Mean Plt Volume           10.9 fL    Unknown

 

          COMPLETE BLOOD COUNT [id number]   Neut Auto           43.1 %    

8 Unknown

 

          COMPLETE BLOOD COUNT [id number]   Lymph Auto           45.0 %    20

18 Unknown

 

          COMPLETE BLOOD COUNT [id number]   Mono Auto           9.2 %     

8 Unknown

 

          COMPLETE BLOOD COUNT [id number]   RDW                 13.4 %    

8 Unknown

 

          COMPLETE BLOOD COUNT [id number]   Eos Auto            2.3 %     

8 Unknown

 

          COMPLETE BLOOD COUNT [id number]   Baso Auto           0.4 %     

8 Unknown

 

          COMPLETE BLOOD COUNT [id number]   Neutrophil Abs           3.23 10e9/L  Unknown

 

          COMPLETE BLOOD COUNT [id number]   Lymphocyte Abs           3.38 10e9/L  Unknown

 

          COMPLETE BLOOD COUNT [id number]   Monocyte Abs           0.69 10e9/L  Unknown

 

          COMPLETE BLOOD COUNT [id number]   Eosinophil Abs           0.17 10e9/L  Unknown

 

          COMPLETE BLOOD COUNT [id number]   RDW-SD              44.4 fL   

8 Unknown

 

          COMPLETE BLOOD COUNT [id number]   Basophil Abs           0.03 10e9/L  Unknown

 

          GFR CALC  3563267   GFR Non Afr Amr           53 mL/min 2018 Unk

nown

 

          GFR CALC  8072499   GFR Afr Amr           >60 mL/min 2018 Unknow

n

 

          GLYCOSYLATED HEMOGLOBIN TEST 49765     Hgb A1c   25046-4   5.4 %     0

2018 Unknown

 

          MEAN GLUC 2471228   Calc Mean Gluc           108 mg/dL 2018 Unkn

own

 

          MEAN GLUC 6392141   Calc Mean Gluc           114 mg/dL 2017 Unkn

own

 

          LIPID GROUP 87520     Cholesterol           146 mg/dL 2017 Unkno

wn

 

          LIPID GROUP 21488     Triglyceride           119 mg/dL 2017 Unkn

own

 

          LIPID GROUP 19071     HDL CHOLESTEROL           47 mg/dL  2017 U

nknown

 

          LIPID GROUP 49343     Chol/HDL Ratio           3.11 ratio 2017 U

nknown

 

          LIPID GROUP 32751     NON-HDL Chol           99 mg/dL  2017 Unkn

own

 

          LIPID GROUP 25150     LDL Cholesterol           75 mg/dL  2017 U

nknown

 

          GLYCOSYLATED HEMOGLOBIN TEST 31430     Hgb A1c   54181-1   5.6 %     0

2017 Unknown

 

          COMPREHENSIVE METABOLIC 10694     AST                 22 U/L    2017 Unknown

 

          COMPREHENSIVE METABOLIC 76588     ALT                 12 U/L    2017 Unknown

 

          COMPREHENSIVE METABOLIC 50531     BUN                 17 mg/dL  2017 Unknown

 

          COMPREHENSIVE METABOLIC 04170     ALBUMIN             4.0 g/dL  2017 Unknown

 

          COMPREHENSIVE METABOLIC 48907     CHLORIDE            110 mmol/L  Unknown

 

          COMPREHENSIVE METABOLIC 77378     Bili Total           0.4 mg/dL  Unknown

 

          COMPREHENSIVE METABOLIC 79337     ALK PHOS            63 U/L    2017 Unknown

 

          COMPREHENSIVE METABOLIC 41398     SODIUM              140 mmol/L  Unknown

 

          COMPREHENSIVE METABOLIC 01182     CREATININE           1.05 mg/dL  Unknown

 

          COMPREHENSIVE METABOLIC 51188     CALCIUM             9.2 mg/dL 2017 Unknown

 

          COMPREHENSIVE METABOLIC 20451     POTASSIUM           4.2 mmol/L  Unknown

 

          COMPREHENSIVE METABOLIC 88027     Total Protein           6.2 g/dL   Unknown

 

          COMPREHENSIVE METABOLIC 71020     Glucose             87 mg/dL  2017 Unknown

 

          COMPREHENSIVE METABOLIC 91557     Bicarbonate           24 mmol/L  Unknown

 

          COMPREHENSIVE METABOLIC 25917     AGAP                6 mmol/L  2017 Unknown

 

          GFR CALC  5506603   GFR Non Afr Amr           51 mL/min 2017 Unk

nown

 

          GFR CALC  6602979   GFR Afr Amr           >60 mL/min 2017 Unknow

n

 

          COMPLETE BLOOD COUNT [id number]   WBC                 6.7 10e9/L 20

17 Unknown

 

          COMPLETE BLOOD COUNT [id number]   RBC                 4.04 10e12/L 2017 Unknown

 

          COMPLETE BLOOD COUNT 1539150   HEMOGLOBIN           12.1 g/dL 20

17 Unknown

 

          COMPLETE BLOOD COUNT 5444801   HEMATOCRIT           38.0 %    20

17 Unknown

 

          COMPLETE BLOOD COUNT 5575504   MCV                 94.1 fL   

7 Unknown

 

          COMPLETE BLOOD COUNT 0640763   MCH                 30.0 pg   

7 Unknown

 

          COMPLETE BLOOD COUNT 0591327   MCHC                31.8 g/dL 

7 Unknown

 

          COMPLETE BLOOD COUNT 7023390   PLATELET COUNT           206 10e9/L  Unknown

 

          COMPLETE BLOOD COUNT [id number]   Mean Plt Volume           11.3 fL    Unknown

 

          COMPLETE BLOOD COUNT [id number]   Neut Auto           35.8 %    

7 Unknown

 

          COMPLETE BLOOD COUNT 1528509   Lymph Auto           51.6 %    20

17 Unknown

 

          COMPLETE BLOOD COUNT [id number]   Mono Auto           8.8 %     

7 Unknown

 

          COMPLETE BLOOD COUNT [id number]   Eos Auto            3.4 %     

7 Unknown

 

          COMPLETE BLOOD COUNT 7772401   RDW                 13.5 %    

7 Unknown

 

          COMPLETE BLOOD COUNT [id number]   Baso Auto           0.4 %     

7 Unknown

 

          COMPLETE BLOOD COUNT [id number]   Neutrophil Abs           2.40 10e9/L  Unknown

 

          COMPLETE BLOOD COUNT [id number]   Lymphocyte Abs           3.46 10e9/L  Unknown

 

          COMPLETE BLOOD COUNT [id number]   Monocyte Abs           0.59 10e9/L  Unknown

 

          COMPLETE BLOOD COUNT [id number]   Eosinophil Abs           0.23 10e9/L  Unknown

 

          COMPLETE BLOOD COUNT [id number]   RDW-SD              45.3 fL   

7 Unknown

 

          COMPLETE BLOOD COUNT [id number]   Basophil Abs           0.03 10e9/L  Unknown

 

          THYROID STIMULATING HORMONE 91115     TSH                 1.981 uIU/mL

 2017 Unknown

 

          COMPLETE BLOOD COUNT [id number]   WBC                 6.0 10e9/L 20

17 Unknown

 

          COMPLETE BLOOD COUNT [id number]   RBC                 4.29 10e12/L 2017 Unknown

 

          COMPLETE BLOOD COUNT [id number]   HEMOGLOBIN           12.9 g/dL 20

17 Unknown

 

          COMPLETE BLOOD COUNT [id number]   HEMATOCRIT           38.4 %    20

17 Unknown

 

          COMPLETE BLOOD COUNT [id number]   MCV                 89.5 fL   

7 Unknown

 

          COMPLETE BLOOD COUNT [id number]   MCH                 30.1 pg   

7 Unknown

 

          COMPLETE BLOOD COUNT [id number]   MCHC                33.6 g/dL 

7 Unknown

 

          COMPLETE BLOOD COUNT [id number]   PLATELET COUNT           181 10e9/L 2017 Unknown

 

          COMPLETE BLOOD COUNT [id number]   Mean Plt Volume           11.7 fL   2017 Unknown

 

          COMPLETE BLOOD COUNT [id number]   Neut Auto           36.9 %    

7 Unknown

 

          COMPLETE BLOOD COUNT [id number]   Lymph Auto           50.4 %    20

17 Unknown

 

          COMPLETE BLOOD COUNT [id number]   Mono Auto           9.0 %     

7 Unknown

 

          COMPLETE BLOOD COUNT [id number]   RDW                 13.7 %    

7 Unknown

 

          COMPLETE BLOOD COUNT 7551508   Eos Auto            3.4 %     

7 Unknown

 

          COMPLETE BLOOD COUNT 5346840   Baso Auto           0.3 %     

7 Unknown

 

          COMPLETE BLOOD COUNT [id number]   Neutrophil Abs           2.21 10e9/L  Unknown

 

          COMPLETE BLOOD COUNT [id number]   Lymphocyte Abs           3.02 10e9/L  Unknown

 

          COMPLETE BLOOD COUNT 7211094   Monocyte Abs           0.54 10e9/L 2017 Unknown

 

          COMPLETE BLOOD COUNT 8509978   Eosinophil Abs           0.20 10e9/L  Unknown

 

          COMPLETE BLOOD COUNT 9633036   Basophil Abs           0.02 10e9/L 2017 Unknown

 

          COMPLETE BLOOD COUNT 8034511   RDW-SD              44.0 fL   

7 Unknown

 

          GLYCOSYLATED HEMOGLOBIN TEST 00848     Hgb A1c   50595-4   5.4 %     0

3/09/2017 Unknown

 

          THYROID STIMULATING HORMONE 81742     TSH                 2.200 uIU/mL

 2017 Unknown

 

          GFR CALC  1365262   GFR Afr Amr           >60 mL/min 2017 Unknow

n

 

          GFR CALC  8232782   GFR Non Afr Amr           50 mL/min 2017 Unk

nown

 

          MEAN GLUC 3447846   Calc Mean Gluc           108 mg/dL 2017 Unkn

own

 

          COMPREHENSIVE METABOLIC 36064     AST                 18 U/L    2017 Unknown

 

          COMPREHENSIVE METABOLIC 35893     ALT                 10 U/L    2017 Unknown

 

          COMPREHENSIVE METABOLIC 79484     BUN                 20 mg/dL  2017 Unknown

 

          COMPREHENSIVE METABOLIC 62045     ALBUMIN             4.1 g/dL  2017 Unknown

 

          COMPREHENSIVE METABOLIC 85720     CHLORIDE            109 mmol/L  Unknown

 

          COMPREHENSIVE METABOLIC 14444     Bili Total           0.6 mg/dL  Unknown

 

          COMPREHENSIVE METABOLIC 77128     ALK PHOS            64 U/L    2017 Unknown

 

          COMPREHENSIVE METABOLIC 83930     SODIUM              141 mmol/L  Unknown

 

          COMPREHENSIVE METABOLIC 87922     CREATININE           1.06 mg/dL 2017 Unknown

 

          COMPREHENSIVE METABOLIC 49051     CALCIUM             9.9 mg/dL 2017 Unknown

 

          COMPREHENSIVE METABOLIC 20654     POTASSIUM           4.2 mmol/L  Unknown

 

          COMPREHENSIVE METABOLIC 45424     Total Protein           6.3 g/dL   Unknown

 

          COMPREHENSIVE METABOLIC 20616     Glucose             99 mg/dL  2017 Unknown

 

          COMPREHENSIVE METABOLIC 20674     Bicarbonate           21 mmol/L 2017 Unknown

 

          COMPREHENSIVE METABOLIC 44064     AGAP                11 mmol/L 2017 Unknown

 

          LIPID GROUP 41099     Cholesterol           169 mg/dL 2016 Unkno

wn

 

          LIPID GROUP 90026     Triglyceride           165 mg/dL 2016 Unkn

own

 

          LIPID GROUP 93980     HDL CHOLESTEROL           43 mg/dL  2016 U

nknown

 

          LIPID GROUP 95797     Chol/HDL Ratio           3.93 ratio 2016 U

nknown

 

          LIPID GROUP 67727     NON-HDL Chol           126 mg/dL 2016 Unkn

own

 

          LIPID GROUP 70878     LDL Cholesterol           93 mg/dL  2016 U

nknown

 

          COMPREHENSIVE METABOLIC 12167     AST                 18 U/L    2016 Unknown

 

          COMPREHENSIVE METABOLIC 89205     ALT                 10 U/L    2016 Unknown

 

          COMPREHENSIVE METABOLIC 12731     BUN                 20 mg/dL  2016 Unknown

 

          COMPREHENSIVE METABOLIC 37887     ALBUMIN             3.9 g/dL  2016 Unknown

 

          COMPREHENSIVE METABOLIC 63508     CHLORIDE            110 mmol/L  Unknown

 

          COMPREHENSIVE METABOLIC 59583     Bili Total           0.5 mg/dL  Unknown

 

          COMPREHENSIVE METABOLIC 24080     ALK PHOS            72 U/L    2016 Unknown

 

          COMPREHENSIVE METABOLIC 63059     SODIUM              141 mmol/L  Unknown

 

          COMPREHENSIVE METABOLIC 26614     CREATININE           1.12 mg/dL  Unknown

 

          COMPREHENSIVE METABOLIC 62416     CALCIUM             9.7 mg/dL 2016 Unknown

 

          COMPREHENSIVE METABOLIC 32085     POTASSIUM           4.4 mmol/L  Unknown

 

          COMPREHENSIVE METABOLIC 60334     Total Protein           6.2 g/dL   Unknown

 

          COMPREHENSIVE METABOLIC 92655     Glucose             90 mg/dL  2016 Unknown

 

          COMPREHENSIVE METABOLIC 34747     Bicarbonate           23 mmol/L  Unknown

 

          COMPREHENSIVE METABOLIC 30805     AGAP                8 mmol/L  2016 Unknown

 

          GFR CALC  2550616   GFR Non Afr Amr           47 mL/min 2016 Unk

nown

 

          GFR CALC  5713772   GFR Afr Amr           57 mL/min 2016 Unknown

 

          GLYCOSYLATED HEMOGLOBIN TEST 65419     Hgb A1c   65267-9   5.5 %     0

2016 Unknown

 

          THYROID STIMULATING HORMONE 56996     TSH                 2.537 uIU/mL

 2016 Unknown

 

          FREE T4   86270     T4 Free             1.36 ng/dL 2016 Unknown

 

          COMPLETE BLOOD COUNT 2770331   WBC                 6.8 10e9/L 20

16 Unknown

 

          COMPLETE BLOOD COUNT 1684513   RBC                 4.20 10e12/L 2016 Unknown

 

          COMPLETE BLOOD COUNT 4095147   HEMOGLOBIN           12.5 g/dL 20

16 Unknown

 

          COMPLETE BLOOD COUNT 5865363   HEMATOCRIT           38.0 %    20

16 Unknown

 

          COMPLETE BLOOD COUNT 0490849   MCV                 90.5 fL   

6 Unknown

 

          COMPLETE BLOOD COUNT 2961908   MCH                 29.8 pg   

6 Unknown

 

          COMPLETE BLOOD COUNT 1087394   MCHC                32.9 g/dL 

6 Unknown

 

          COMPLETE BLOOD COUNT 8648656   PLATELET COUNT           197 10e9/L  Unknown

 

          COMPLETE BLOOD COUNT 7272384   Mean Plt Volume           11.7 fL    Unknown

 

          COMPLETE BLOOD COUNT 1131479   Neut Auto           41.3 %    

6 Unknown

 

          COMPLETE BLOOD COUNT 1668603   Lymph Auto           47.1 %    20

16 Unknown

 

          COMPLETE BLOOD COUNT 7420018   Mono Auto           7.8 %     

6 Unknown

 

          COMPLETE BLOOD COUNT 2334175   Eos Auto            3.4 %     

6 Unknown

 

          COMPLETE BLOOD COUNT 9439005   RDW                 13.8 %    

6 Unknown

 

          COMPLETE BLOOD COUNT 3747568   Baso Auto           0.4 %     

6 Unknown

 

          COMPLETE BLOOD COUNT 6891132   Neutrophil Abs           2.81 10e9/L  Unknown

 

          COMPLETE BLOOD COUNT 6119156   Lymphocyte Abs           3.20 10e9/L  Unknown

 

          COMPLETE BLOOD COUNT 0593372   Monocyte Abs           0.53 10e9/L  Unknown

 

          COMPLETE BLOOD COUNT [id number]   Eosinophil Abs           0.23 10e9/L  Unknown

 

          COMPLETE BLOOD COUNT [id number]   Basophil Abs           0.03 10e9/L  Unknown

 

          COMPLETE BLOOD COUNT 5208781   RDW-SD              44.4 fL   

6 Unknown

 

          MEAN GLUC 5011932   Calc Mean Gluc           111 mg/dL 2016 Unkn

own

 

          [place] [address]     Glucose             89 MG/DL   Unknown

 

          [place] [address]     CREATININE           1.12 MG/DL  Unknown

 

          [place] [address]     BUN                 20 MG/DL   Unknown

 

          [place] [address]     SODIUM              139 MMOL/L  Unknown

 

          [place] [address]     POTASSIUM           4.6 MMOL/L  Unknown

 

          [place] [address]     CHLORIDE            108 MMOL/L  Unknown

 

          [place] [address]     BICARB              26 MMOL/L  Unknown

 

          [place] [address]     ANION GAP           5 MEQ/L    Unknown

 

          [place] [address]     CALCIUM             10.0 MG/DL  Unknown

 

          GFR CALC  7320407   GFR AA              57.0L ML/MIN 2015 Unknow

n

 

          GFR CALC  0552724   GFR NON-AA           47.0L ML/MIN 2015 Unkno

wn

 

          THYROID STIMULATING HORMONE 22990     TSH                 2.378 uIU/ML

 09/10/2015 Unknown

 

          COMPLETE BLOOD COUNT 8001300   WBC                 6.4 10e9/L 09/10/20

15 Unknown

 

          COMPLETE BLOOD COUNT [id number]   RBC                 3.99 10e12/L 09/10/

2015 Unknown

 

          COMPLETE BLOOD COUNT 3559171   HGB                 11.9 g/dL 09/10/201

5 Unknown

 

          COMPLETE BLOOD COUNT [id number]   HCT DET             36.9 %    09/10/201

5 Unknown

 

          COMPLETE BLOOD COUNT 5802049   MCV                 92.5 fL   09/10/201

5 Unknown

 

          COMPLETE BLOOD COUNT 6090483   MCH                 29.8 pg   09/10/201

5 Unknown

 

          COMPLETE BLOOD COUNT 7090240   MCHC                32.2 g/dL 09/10/201

5 Unknown

 

          COMPLETE BLOOD COUNT [id number]   PLT                 172 10e9/L 09/10/20

15 Unknown

 

          COMPLETE BLOOD COUNT [id number]   MPV                 11.7 fL   09/10/201

5 Unknown

 

          COMPLETE BLOOD COUNT 8043580   ARIK %               40.4 %    09/10/201

5 Unknown

 

          COMPLETE BLOOD COUNT 9604050   LY %                48.0 %    09/10/201

5 Unknown

 

          COMPLETE BLOOD COUNT 6441320   MON %               8.3 %     09/10/201

5 Unknown

 

          COMPLETE BLOOD COUNT [id number]   EOS  %              2.8 %     09/10/201

5 Unknown

 

          COMPLETE BLOOD COUNT [id number]   BASO %              0.5 %     09/10/201

5 Unknown

 

          COMPLETE BLOOD COUNT [id number]   RDW                 13.6 %    09/10/201

5 Unknown

 

          COMPLETE BLOOD COUNT [id number]   ABS ARIK             2.59 10e9/L 09/10/2

015 Unknown

 

          COMPLETE BLOOD COUNT 9204509   ABS LYMPH           3.07 10e9/L 09/10/2

015 Unknown

 

          COMPLETE BLOOD COUNT [id number]   ABS MONO            0.53 10e9/L 09/10/2

015 Unknown

 

          COMPLETE BLOOD COUNT [id number]   ABS EOS             0.18 10e9/L 09/10/2

015 Unknown

 

          COMPLETE BLOOD COUNT [id number]   ABS BASO            0.03 10e9/L 09/10/2

015 Unknown

 

          COMPLETE BLOOD COUNT 0648075   RDW-SD              44.9 fL   09/10/201

5 Unknown

 

          LIPID GROUP 19609     HDL TEST            42 MG/DL  09/10/2015 Unknown

 

          LIPID GROUP 66425     TRIG                177 MG/DL 09/10/2015 Unknown

 

          LIPID GROUP 29684     TEST LDL            72 MG/DL  09/10/2015 Unknown

 

          LIPID GROUP 22948     CHOL                149 MG/DL 09/10/2015 Unknown

 

          LIPID GROUP 09402     RCHOL/HDL           3.55 RATIO 09/10/2015 Unknow

n

 

          LIPID GROUP 19495     NON-HDL CH           107 MG/DL 09/10/2015 Unknow

n

 

          GLYCOSYLATED HEMOGLOBIN TEST 92587     A1C HPLC  56319-1   5.5 %     0

9/10/2015 Unknown

 

          FREE T4   56150     FREE T4             1.39 NG/DL 09/10/2015 Unknown

 

          GFR CALC  1317374   GFR AA              55.0L ML/MIN 09/10/2015 Unknow

n

 

          GFR CALC  7470559   GFR NON-AA           46.0L ML/MIN 09/10/2015 Unkno

wn

 

          COMPREHENSIVE METABOLIC 97553     AST                 17 U/L    09/10/

2015 Unknown

 

          COMPREHENSIVE METABOLIC 57639     ALT                 10 IU/L   09/10/

2015 Unknown

 

          COMPREHENSIVE METABOLIC 88978     BUN                 20 MG/DL  09/10/

2015 Unknown

 

          COMPREHENSIVE METABOLIC 93492     ALBUMIN             3.9 GM/DL 09/10/

2015 Unknown

 

          COMPREHENSIVE METABOLIC 67087     CHLORIDE            111 MMOL/L 09/10

/2015 Unknown

 

          COMPREHENSIVE METABOLIC 28424     BILI TOT            0.4 MG/DL 09/10/

2015 Unknown

 

          COMPREHENSIVE METABOLIC 43294     ALK PHOS            70 U/L    09/10/

2015 Unknown

 

          COMPREHENSIVE METABOLIC 93996     SODIUM              142 MMOL/L 09/10

/2015 Unknown

 

          COMPREHENSIVE METABOLIC 39497     CREATININE           1.16 MG/DL  Unknown

 

          COMPREHENSIVE METABOLIC 71206     CALCIUM             9.4 MG/DL 09/10/

2015 Unknown

 

          COMPREHENSIVE METABOLIC 85288     POTASSIUM           4.6 MMOL/L 09/10

/2015 Unknown

 

          COMPREHENSIVE METABOLIC 55289     PROT TOT            6.2 GM/DL 09/10/

2015 Unknown

 

          COMPREHENSIVE METABOLIC 11852     Glucose             90 MG/DL  09/10/

2015 Unknown

 

          COMPREHENSIVE METABOLIC 10601     BICARB              24 MMOL/L 09/10/

2015 Unknown

 

          COMPREHENSIVE METABOLIC 91749     ANION GAP           7 MEQ/L   09/10/

2015 Unknown

 

          THYROID STIMULATING HORMONE 77368     TSH                 2.427 uIU/ML

 2015 Unknown

 

          LIPID GROUP 46329     HDL TEST            47 MG/DL  2015 Unknown

 

          LIPID GROUP 97307     TRIG                145 MG/DL 2015 Unknown

 

          LIPID GROUP 09663     TEST LDL            73 MG/DL  2015 Unknown

 

          LIPID GROUP 49180     CHOL                149 MG/DL 2015 Unknown

 

          LIPID GROUP 30686     RCHOL/HDL           3.17 RATIO 2015 Unknow

n

 

          LIPID GROUP 57787     NON-HDL CH           102 MG/DL 2015 Unknow

n

 

          COMPREHENSIVE METABOLIC 43574     AST                 17 U/L    2015 Unknown

 

          COMPREHENSIVE METABOLIC 22220     ALT                 9 IU/L    2015 Unknown

 

          COMPREHENSIVE METABOLIC 93778     BUN                 19 MG/DL  2015 Unknown

 

          COMPREHENSIVE METABOLIC 19761     ALBUMIN             4.3 GM/DL 2015 Unknown

 

          COMPREHENSIVE METABOLIC 51611     CHLORIDE            108 MMOL/L  Unknown

 

          COMPREHENSIVE METABOLIC 26292     BILI TOT            0.5 MG/DL 2015 Unknown

 

          COMPREHENSIVE METABOLIC 81311     ALK PHOS            68 U/L    2015 Unknown

 

          COMPREHENSIVE METABOLIC 90661     SODIUM              140 MMOL/L  Unknown

 

          COMPREHENSIVE METABOLIC 27239     CREATININE           1.08 MG/DL 2015 Unknown

 

          COMPREHENSIVE METABOLIC 31090     CALCIUM             9.9 MG/DL 2015 Unknown

 

          COMPREHENSIVE METABOLIC 16134     POTASSIUM           4.3 MMOL/L  Unknown

 

          COMPREHENSIVE METABOLIC 00154     PROT TOT            7.2 GM/DL 2015 Unknown

 

          COMPREHENSIVE METABOLIC 63573     Glucose             94 MG/DL  2015 Unknown

 

          COMPREHENSIVE METABOLIC 16004     BICARB              26 MMOL/L 2015 Unknown

 

          COMPREHENSIVE METABOLIC 83336     ANION GAP           6 MEQ/L   2015 Unknown

 

          GFR CALC  3401621   GFR AA              60.0L ML/MIN 2015 Unknow

n

 

          GFR CALC  6739089   GFR NON-AA           49.0L ML/MIN 2015 Unkno

wn

 

          GLYCOSYLATED HEMOGLOBIN TEST 02073     A1C HPLC  99717-4   5.6 %     0

3/06/2015 Unknown

 

          COMPLETE BLOOD COUNT 2524105   WBC                 7.2 10e9/L 20

15 Unknown

 

          COMPLETE BLOOD COUNT 1283075   RBC                 4.28 10e12/L 2015 Unknown

 

          COMPLETE BLOOD COUNT 5946171   HGB                 12.8 g/dL 

5 Unknown

 

          COMPLETE BLOOD COUNT 6947699   HCT DET             39.3 %    

5 Unknown

 

          COMPLETE BLOOD COUNT 9936493   MCV                 91.8 fL   

5 Unknown

 

          COMPLETE BLOOD COUNT 3703116   MCH                 29.9 pg   

5 Unknown

 

          COMPLETE BLOOD COUNT 2223391   MCHC                32.6 g/dL 

5 Unknown

 

          COMPLETE BLOOD COUNT 0566848   PLT                 189 10e9/L 20

15 Unknown

 

          COMPLETE BLOOD COUNT [id number]   MPV                 11.2 fL   

5 Unknown

 

          COMPLETE BLOOD COUNT 5509674   ARIK %               38.0 %    

5 Unknown

 

          COMPLETE BLOOD COUNT 1291723   LY %                51.0 %    

5 Unknown

 

          COMPLETE BLOOD COUNT 4253418   MON %               7.7 %     

5 Unknown

 

          COMPLETE BLOOD COUNT 5689710   EOS  %              2.9 %     

5 Unknown

 

          COMPLETE BLOOD COUNT 0451867   BASO %              0.4 %     

5 Unknown

 

          COMPLETE BLOOD COUNT 0046315   RDW                 14.0 %    

5 Unknown

 

          COMPLETE BLOOD COUNT [id number]   ABS ARIK             2.74 10e9/L 

015 Unknown

 

          COMPLETE BLOOD COUNT 5238390   ABS LYMPH           3.67 10e9/L 

015 Unknown

 

          COMPLETE BLOOD COUNT 1188046   ABS MONO            0.55 10e9/L 

015 Unknown

 

          COMPLETE BLOOD COUNT 6336013   ABS EOS             0.21 10e9/L 

015 Unknown

 

          COMPLETE BLOOD COUNT 1068496   ABS BASO            0.03 10e9/L 

015 Unknown

 

          COMPLETE BLOOD COUNT 7056330   RDW-SD              46.1 fL   

5 Unknown

 

          FREE T4   17914     FREE T4             1.14 NG/DL 2015 Unknown

 

          GLYCOSYLATED HEMOGLOBIN TEST 12878     A1C HPLC  28477-9   5.2 %     0

2014 Unknown

 

          FREE T4   47244     FREE T4             1.40 NG/DL 2014 Unknown

 

          GFR CALC  0870081   GFR AA              >60 ML/MIN 2014 Unknown

 

          GFR CALC  6059566   GFR NON-AA           52.0L ML/MIN 2014 Unkno

wn

 

          COMPREHENSIVE METABOLIC 77208     AST                 15 U/L    2014 Unknown

 

          COMPREHENSIVE METABOLIC 05166     ALT                 9 IU/L    2014 Unknown

 

          COMPREHENSIVE METABOLIC 89701     BUN                 17 MG/DL  2014 Unknown

 

          COMPREHENSIVE METABOLIC 26299     ALBUMIN             4.0 GM/DL 2014 Unknown

 

          COMPREHENSIVE METABOLIC 94966     CHLORIDE            112 MMOL/L  Unknown

 

          COMPREHENSIVE METABOLIC 23579     BILI TOT            0.5 MG/DL 2014 Unknown

 

          COMPREHENSIVE METABOLIC 41364     ALK PHOS            66 U/L    2014 Unknown

 

          COMPREHENSIVE METABOLIC 28696     SODIUM              140 MMOL/L  Unknown

 

          COMPREHENSIVE METABOLIC 26858     CREATININE           1.03 MG/DL  Unknown

 

          COMPREHENSIVE METABOLIC 65608     CALCIUM             9.5 MG/DL 2014 Unknown

 

          COMPREHENSIVE METABOLIC 79322     POTASSIUM           4.1 MMOL/L  Unknown

 

          COMPREHENSIVE METABOLIC 73062     PROT TOT            6.2 GM/DL 2014 Unknown

 

          COMPREHENSIVE METABOLIC 80569     Glucose             102 MG/DL 2014 Unknown

 

          COMPREHENSIVE METABOLIC 74784     BICARB              23 MMOL/L 2014 Unknown

 

          COMPREHENSIVE METABOLIC 06103     ANION GAP           5 MEQ/L   2014 Unknown

 

          THYROID STIMULATING HORMONE 28568     TSH                 2.074 uIU/ML

 2014 Unknown

 

          VITAMIN B 12 FOLIC ACID [id number] VIT B 12            423 PG/ML  Unknown

 

          VITAMIN B 12 FOLIC ACID [id number] FOLIC ACID           19.7 NG/ML  Unknown

 

          LIPID GROUP [id number]     HDL TEST            40 MG/DL  2014 Unknown

 

          LIPID GROUP [id number]     TRIG                145 MG/DL 2014 Unknown

 

          LIPID GROUP [id number]     TEST LDL            81 MG/DL  2014 Unknown

 

          LIPID GROUP [id number]     CHOL                150 MG/DL 2014 Unknown

 

          LIPID GROUP [id number]     RCHOL/HDL           3.75 RATIO 2014 Unknow

n

 

          COMPLETE BLOOD COUNT [id number]   WBC                 6.0 10e9/L 20

14 Unknown

 

          COMPLETE BLOOD COUNT [id number]   RBC                 4.26 10e12/L 2014 Unknown

 

          COMPLETE BLOOD COUNT [id number]   HGB                 12.7 g/dL 

4 Unknown

 

          COMPLETE BLOOD COUNT [id number]   HCT DET             38.7 %    

4 Unknown

 

          COMPLETE BLOOD COUNT [id number]   MCV                 90.8 fL   

4 Unknown

 

          COMPLETE BLOOD COUNT [id number]   MCH                 29.8 pg   

4 Unknown

 

          COMPLETE BLOOD COUNT [id number]   MCHC                32.8 g/dL 

4 Unknown

 

          COMPLETE BLOOD COUNT [id number]   PLT                 178 10e9/L 20

14 Unknown

 

          COMPLETE BLOOD COUNT [id number]   MPV                 11.7 fL   

4 Unknown

 

          COMPLETE BLOOD COUNT [id number]   ARIK %               30.5 %    

4 Unknown

 

          COMPLETE BLOOD COUNT 0650761   LY %                55.4 %    

4 Unknown

 

          COMPLETE BLOOD COUNT [id number]   MON %               9.0 %     

4 Unknown

 

          COMPLETE BLOOD COUNT 7272822   EOS  %              4.4 %     

4 Unknown

 

          COMPLETE BLOOD COUNT [id number]   BASO %              0.7 %     

4 Unknown

 

          COMPLETE BLOOD COUNT [id number]   RDW                 13.3 %    

4 Unknown

 

          COMPLETE BLOOD COUNT [id number]   ABS ARIK             1.83 10e9/L 

014 Unknown

 

          COMPLETE BLOOD COUNT [id number]   ABS LYMPH           3.32 10e9/L 

014 Unknown

 

          COMPLETE BLOOD COUNT [id number]   ABS MONO            0.54 10e9/L 

014 Unknown

 

          COMPLETE BLOOD COUNT [id number]   ABS EOS             0.26 10e9/L 

014 Unknown

 

          COMPLETE BLOOD COUNT 2550993   ABS BASO            0.04 10e9/L 

014 Unknown

 

          COMPLETE BLOOD COUNT 8792122   RDW-SD              43.2 fL   

4 Unknown

 

          HEMOGLOBIN A1C (GLYCOSYLATED) 1269721   A1C HPLC  28192-8   5.5 %     

2012 Unknown

 

          COMPLETE BLOOD COUNT [id number]   WBC                 6.0 10e9/L 20

12 Unknown

 

          COMPLETE BLOOD COUNT [id number]   RBC                 4.22 10e12/L 2012 Unknown

 

          COMPLETE BLOOD COUNT 9873479   HGB                 12.4 g/dL 

2 Unknown

 

          COMPLETE BLOOD COUNT 1339960   HCT DET             38.2 %    

2 Unknown

 

          COMPLETE BLOOD COUNT 8174255   MCV                 90.5 fL   

2 Unknown

 

          COMPLETE BLOOD COUNT [id number]   MCH                 29.4 pg   

2 Unknown

 

          COMPLETE BLOOD COUNT 7397542   MCHC                32.5 g/dL 

2 Unknown

 

          COMPLETE BLOOD COUNT 6990165   PLT                 187 10e9/L 20

12 Unknown

 

          COMPLETE BLOOD COUNT 7954804   MPV                 11.5 fL   

2 Unknown

 

          COMPLETE BLOOD COUNT 2145785   ARIK %               36.4 %    

2 Unknown

 

          COMPLETE BLOOD COUNT 1714077   LY %                51.0 %    

2 Unknown

 

          COMPLETE BLOOD COUNT 7717711   MON %               8.7 %     

2 Unknown

 

          COMPLETE BLOOD COUNT 2852972   EOS  %              3.2 %     

2 Unknown

 

          COMPLETE BLOOD COUNT 6769259   BASO %              0.7 %     

2 Unknown

 

          COMPLETE BLOOD COUNT 1241926   RDW                 13.7 %    

2 Unknown

 

          COMPLETE BLOOD COUNT [id number]   ABS ARIK             2.18 10e9/L 

012 Unknown

 

          COMPLETE BLOOD COUNT 5229629   ABS LYMPH           3.06 10e9/L 

012 Unknown

 

          COMPLETE BLOOD COUNT 4642521   ABS MONO            0.52 10e9/L 

012 Unknown

 

          COMPLETE BLOOD COUNT 7374661   ABS EOS             0.19 10e9/L 

012 Unknown

 

          COMPLETE BLOOD COUNT 0298604   ABS BASO            0.04 10e9/L 

012 Unknown

 

          COMPLETE BLOOD COUNT 9791729   RDW-SD              44.3 fL   

2 Unknown

 

          LIPID GROUP [id number]     HDL TEST            42 MG/DL  2012 Unknown

 

          LIPID GROUP [id number]     TRIG                156 MG/DL 2012 Unknown

 

          LIPID GROUP [id number]     TEST LDL            80 MG/DL  2012 Unknown

 

          LIPID GROUP [id number]     CHOL                153 MG/DL 2012 Unknown

 

          LIPID GROUP [id number]     RCHOL/HDL           3.64 RATIO 2012 Unknow

n

 

          FREE T4   63086     FREE T4             1.22 NG/DL 2012 Unknown

 

          COMPREHENSIVE METABOLIC 36641     AST                 20 U/L    2012 Unknown

 

          COMPREHENSIVE METABOLIC 76944     ALT                 11 IU/L   2012 Unknown

 

          COMPREHENSIVE METABOLIC 35535     BUN                 19 MG/DL  2012 Unknown

 

          COMPREHENSIVE METABOLIC 48715     ALBUMIN             4.3 GM/DL 2012 Unknown

 

          COMPREHENSIVE METABOLIC 48081     CHLORIDE            109 MMOL/L  Unknown

 

          COMPREHENSIVE METABOLIC 54454     BILI TOT            0.6 MG/DL 2012 Unknown

 

          COMPREHENSIVE METABOLIC 36981     ALK PHOS            84 U/L    2012 Unknown

 

          COMPREHENSIVE METABOLIC 12057     SODIUM              142 MMOL/L  Unknown

 

          COMPREHENSIVE METABOLIC 55831     CREATININE           1.09 MG/DL  Unknown

 

          COMPREHENSIVE METABOLIC 55378     CALCIUM             9.8 MG/DL 2012 Unknown

 

          COMPREHENSIVE METABOLIC 73797     POTASSIUM           4.2 MMOL/L  Unknown

 

          COMPREHENSIVE METABOLIC 04699     PROT TOT            6.4 GM/DL 2012 Unknown

 

          COMPREHENSIVE METABOLIC 98845     Glucose             89 MG/DL  2012 Unknown

 

          COMPREHENSIVE METABOLIC 36483     BICARB              25 MMOL/L 2012 Unknown

 

          COMPREHENSIVE METABOLIC 29973     ANION GAP           8 MEQ/L   2012 Unknown

 

          GFR CALC  2599554   GFR AA              60.0L ML/MIN 2012 Unknow

n

 

          GFR CALC  1918939   GFR NON-AA           49.0L ML/MIN 2012 Unkno

wn

 

          THYROID STIMULATING HORMONE 92209     TSH                 2.450 uIU/ML

 2012 Unknown

 

          COMPREHENSIVE METABOLIC 86837     AST                 22 U/L    2012 Unknown

 

          COMPREHENSIVE METABOLIC 67049     ALT                 14 IU/L   2012 Unknown

 

          COMPREHENSIVE METABOLIC 88896     BUN                 21 MG/DL  2012 Unknown

 

          COMPREHENSIVE METABOLIC 67744     ALBUMIN             4.3 GM/DL 2012 Unknown

 

          COMPREHENSIVE METABOLIC 97405     CHLORIDE            106 MMOL/L  Unknown

 

          COMPREHENSIVE METABOLIC 15972     BILI TOT            0.4 MG/DL 2012 Unknown

 

          COMPREHENSIVE METABOLIC 55435     ALK PHOS            80 U/L    2012 Unknown

 

          COMPREHENSIVE METABOLIC 81225     SODIUM              141 MMOL/L  Unknown

 

          COMPREHENSIVE METABOLIC 61408     CREATININE           1.13 MG/DL  Unknown

 

          COMPREHENSIVE METABOLIC 16216     CALCIUM             9.4 MG/DL 2012 Unknown

 

          COMPREHENSIVE METABOLIC 08723     POTASSIUM           4.3 MMOL/L  Unknown

 

          COMPREHENSIVE METABOLIC 10479     PROT TOT            6.7 GM/DL 2012 Unknown

 

          COMPREHENSIVE METABOLIC 05383     Glucose             98 MG/DL  2012 Unknown

 

          COMPREHENSIVE METABOLIC 98163     BICARB              25 MMOL/L 2012 Unknown

 

          COMPREHENSIVE METABOLIC 15749     ANION GAP           10 MEQ/L  2012 Unknown

 

          LIPID GROUP [id number]     HDL TEST            44 MG/DL  2012 Unknown

 

          LIPID GROUP [id number]     TRIG                164 MG/DL 2012 Unknown

 

          LIPID GROUP 64533     TEST LDL            98 MG/DL  2012 Unknown

 

          LIPID GROUP 06586     CHOL                175 MG/DL 2012 Unknown

 

          LIPID GROUP [id number]     RCHOL/HDL           3.98 RATIO 2012 Unknow

n

 

          COMPLETE BLOOD COUNT 91350     WBC                 6.7 10e9/L 20

12 Unknown

 

          COMPLETE BLOOD COUNT 32166     RBC                 4.36 10e12/L 2012 Unknown

 

          COMPLETE BLOOD COUNT 85915     HGB                 12.9 g/dL 

2 Unknown

 

          COMPLETE BLOOD COUNT 01968     HCT DET             39.4 %    

2 Unknown

 

          COMPLETE BLOOD COUNT 24067     MCV                 90.4 fL   

2 Unknown

 

          COMPLETE BLOOD COUNT 13157     MCH                 29.6 pg   

2 Unknown

 

          COMPLETE BLOOD COUNT 17341     MCHC                32.7 g/dL 

2 Unknown

 

          COMPLETE BLOOD COUNT 74962     PLT                 184 10e9/L 20

12 Unknown

 

          COMPLETE BLOOD COUNT 54614     MPV                 10.9 fL   

2 Unknown

 

          COMPLETE BLOOD COUNT 92544     ARIK %               41.5 %    

2 Unknown

 

          COMPLETE BLOOD COUNT 24074     LY %                45.7 %    

2 Unknown

 

          COMPLETE BLOOD COUNT 49520     MON %               9.4 %     

2 Unknown

 

          COMPLETE BLOOD COUNT 07947     EOS  %              3.0 %     

2 Unknown

 

          COMPLETE BLOOD COUNT 63686     BASO %              0.4 %     

2 Unknown

 

          COMPLETE BLOOD COUNT 59049     RDW                 13.2 %    

2 Unknown

 

          COMPLETE BLOOD COUNT 70524     ABS ARIK             2.78 10e9/L 

012 Unknown

 

          COMPLETE BLOOD COUNT 54421     ABS LYMPH           3.06 10e9/L 

012 Unknown

 

          COMPLETE BLOOD COUNT 41687     ABS MONO            0.63 10e9/L 

012 Unknown

 

          COMPLETE BLOOD COUNT 10477     ABS EOS             0.20 10e9/L 

012 Unknown

 

          COMPLETE BLOOD COUNT 43271     ABS BASO            0.03 10e9/L 

012 Unknown

 

          COMPLETE BLOOD COUNT 57526     RDW-SD              42.3 fL   

2 Unknown

 

          GFR CALC  3739781   GFR AA              57.0L ML/MIN 2012 Unknow

n

 

          GFR CALC  8980980   GFR NON-AA           47.0L ML/MIN 2012 Unkno

wn

 

          THYROID STIMULATING HORMONE 68568     TSH                 2.663 uIU/ML

 2012 Unknown

 

          FREE T4   90840     FREE T4             1.15 NG/DL 2012 Unknown

 

          THYROID STIMULATING HORMONE 21256     TSH                 1.908 uIU/ML

 2011 Unknown

 

          COMPLETE BLOOD COUNT 58421     WBC                 6.4 10e9/L 20

11 Unknown

 

          COMPLETE BLOOD COUNT 03486     RBC                 3.92 10e12/L 2011 Unknown

 

          COMPLETE BLOOD COUNT 89961     HGB                 11.8 g/dL 

1 Unknown

 

          COMPLETE BLOOD COUNT 84377     HCT DET             36.0 %    

1 Unknown

 

          COMPLETE BLOOD COUNT 15349     MCV                 91.8 fL   

1 Unknown

 

          COMPLETE BLOOD COUNT 31858     MCH                 30.1 pg   

1 Unknown

 

          COMPLETE BLOOD COUNT 50905     MCHC                32.8 g/dL 

1 Unknown

 

          COMPLETE BLOOD COUNT 21897     PLT                 176 10e9/L 20

11 Unknown

 

          COMPLETE BLOOD COUNT 02362     MPV                 11.4 fL   

1 Unknown

 

          COMPLETE BLOOD COUNT 78838     ARIK %               50.4 %    

1 Unknown

 

          COMPLETE BLOOD COUNT 75450     LY %                35.5 %    

1 Unknown

 

          COMPLETE BLOOD COUNT 14271     MON %               10.2 %    

1 Unknown

 

          COMPLETE BLOOD COUNT 68331     EOS  %              3.3 %     

1 Unknown

 

          COMPLETE BLOOD COUNT 04150     BASO %              0.6 %     

1 Unknown

 

          COMPLETE BLOOD COUNT 99373     RDW                 13.7 %    

1 Unknown

 

          COMPLETE BLOOD COUNT 77384     ABS ARIK             3.23 10e9/L 

011 Unknown

 

          COMPLETE BLOOD COUNT 46390     ABS LYMPH           2.27 10e9/L 

011 Unknown

 

          COMPLETE BLOOD COUNT 41924     ABS MONO            0.65 10e9/L 

011 Unknown

 

          COMPLETE BLOOD COUNT 97123     ABS EOS             0.21 10e9/L 

011 Unknown

 

          COMPLETE BLOOD COUNT 61374     ABS BASO            0.04 10e9/L 

011 Unknown

 

          COMPLETE BLOOD COUNT 14517     RDW-SD              45.3 fL   

1 Unknown

 

          GFR CALC  1428595   GFR AA              >60 ML/MIN 2011 Unknown

 

          GFR CALC  3176978   GFR NON-AA           53.0L ML/MIN 2011 Unkno

wn

 

          FREE T4   88898     FREE T4             1.20 NG/DL 2011 Unknown

 

          COMPREHENSIVE METABOLIC 03102     AST                 17 U/L    2011 Unknown

 

          COMPREHENSIVE METABOLIC 91262     ALT                 9 IU/L    2011 Unknown

 

          COMPREHENSIVE METABOLIC 33621     BUN                 16 MG/DL  2011 Unknown

 

          COMPREHENSIVE METABOLIC 90585     ALBUMIN             4.0 GM/DL 2011 Unknown

 

          COMPREHENSIVE METABOLIC 12328     CHLORIDE            108 MMOL/L  Unknown

 

          COMPREHENSIVE METABOLIC 79154     BILI TOT            0.5 MG/DL 2011 Unknown

 

          COMPREHENSIVE METABOLIC 96734     ALK PHOS            76 U/L    2011 Unknown

 

          COMPREHENSIVE METABOLIC 48885     SODIUM              139 MMOL/L  Unknown

 

          COMPREHENSIVE METABOLIC 77721     CREATININE           1.02 MG/DL 2011 Unknown

 

          COMPREHENSIVE METABOLIC 85631     CALCIUM             9.2 MG/DL 2011 Unknown

 

          COMPREHENSIVE METABOLIC 41779     POTASSIUM           4.5 MMOL/L  Unknown

 

          COMPREHENSIVE METABOLIC 31149     PROT TOT            6.1 GM/DL 2011 Unknown

 

          COMPREHENSIVE METABOLIC 42644     Glucose             93 MG/DL  2011 Unknown

 

          COMPREHENSIVE METABOLIC 68074     BICARB              26 MMOL/L 2011 Unknown

 

          COMPREHENSIVE METABOLIC 06375     ANION GAP           5 MEQ/L   2011 Unknown

 

          LIPID GROUP 22650     HDL TEST            46 MG/DL  2011 Unknown

 

          LIPID GROUP 88772     TRIG                102 MG/DL 2011 Unknown

 

          LIPID GROUP 51620     TEST LDL            88 MG/DL  2011 Unknown

 

          LIPID GROUP 66488     CHOL                154 MG/DL 2011 Unknown

 

          LIPID GROUP 88389     RCHOL/HDL           3.35 RATIO 2011 Unknow

n







Procedures





                    Procedure           Codes               Date

 

                    ROUTINE VENIPUNCTURE CPT-4: 75370        2020

 

                    ASSAY THYROID STIM HORMONE CPT-4: 84572        2020

 

                    COMPLETE CBC W/AUTO DIFF WBC CPT-4: 28805        2020

 

                    COMPREHEN METABOLIC PANEL CPT-4: 28125        2020

 

                    ROUTINE VENIPUNCTURE CPT-4: 93978        2019

 

                    LIPID PANEL         CPT-4: 28891        2019

 

                    FLU VACC PRSV FREE INC ANTIG 65 AND OLDER CPT-4: 04642      

  10/22/2019

 

                    FLU VACC PRSV FREE INC ANTIG 65 AND OLDER CPT-4: 14153      

  10/22/2019

 

                    ADMIN INFLUENZA VIRUS VAC CPT-4:         10/22/2019

 

                    COMPREHEN METABOLIC PANEL CPT-4: 21101        10/11/2019

 

                    ROUTINE VENIPUNCTURE CPT-4: 27951        10/11/2019

 

                    ROUTINE VENIPUNCTURE CPT-4: 65187        06/10/2019

 

                    ASSAY THYROID STIM HORMONE CPT-4: 17991        06/10/2019

 

                    COMPREHEN METABOLIC PANEL CPT-4: 90357        06/10/2019

 

                    COMPLETE CBC W/AUTO DIFF WBC CPT-4: 56018        06/10/2019

 

                    URINALYSIS NONAUTO W/O SCOPE CPT-4: 14015        2019

 

                    URINE CULTURE/ COLONY COUNT CPT-4: 79156        2019

 

                    URINE CULTURE/ COLONY COUNT CPT-4: 35458        10/02/2018

 

                    ROUTINE VENIPUNCTURE CPT-4: 21190        2018

 

                    ASSAY OF FREE THYROXINE CPT-4: 24986        2018

 

                    ASSAY THYROID STIM HORMONE CPT-4: 48733        2018

 

                    COMPLETE CBC W/AUTO DIFF WBC CPT-4: 74996        2018

 

                    METABOLIC PANEL TOTAL CA CPT-4: 67330        2018

 

                    FLU VACC PRSV FREE INC ANTIG 65 AND OLDER CPT-4: 99473      

  2018

 

                    ASSAY, GLUCOSE, BLOOD QUANT CPT-4: 95988        2018

 

                    ADMIN INFLUENZA VIRUS VAC CPT-4:         2018

 

                    ROUTINE VENIPUNCTURE CPT-4: 51657        2018

 

                    COMPREHEN METABOLIC PANEL CPT-4: 25165        2018

 

                    COMPLETE CBC W/AUTO DIFF WBC CPT-4: 03280        2018

 

                    A1C HPLC            CPT-4: 16381        2018

 

                    ASSAY OF TROPONIN QUANT CPT-4: 00290        2018

 

                    LIPID PANEL         CPT-4: 25702        2018

 

                    THER/PROPH/DIAG INJ SC/IM CPT-4: 31176        2018

 

                    TRIAMCINOLONE ACET INJ NOS CPT-4:         2018

 

                    URINALYSIS NONAUTO W/O SCOPE CPT-4: 53879        2018

 

                    URINE CULTURE/ COLONY COUNT CPT-4: 58561        2018

 

                    FLU VACC PRSV FREE INC ANTIG 65 AND OLDER CPT-4: 07655      

  10/06/2017

 

                    ADMIN INFLUENZA VIRUS VAC CPT-4:         10/06/2017

 

                    ROUTINE VENIPUNCTURE CPT-4: 49568        2017

 

                    COMPREHEN METABOLIC PANEL CPT-4: 28907        2017

 

                    COMPLETE CBC W/AUTO DIFF WBC CPT-4: 25332        2017

 

                    LIPID PANEL         CPT-4: 84339        2017

 

                    A1C HPLC            CPT-4: 71600        2017

 

                    ASSAY THYROID STIM HORMONE CPT-4: 64418        2017

 

                    ROUTINE VENIPUNCTURE CPT-4: 79733        2017

 

                    ASSAY THYROID STIM HORMONE CPT-4: 07057        2017

 

                    COMPREHEN METABOLIC PANEL CPT-4: 74811        2017

 

                    COMPLETE CBC W/AUTO DIFF WBC CPT-4: 05006        2017

 

                    A1C HPLC            CPT-4: 58683        2017

 

                    FLU VACC PRSV FREE INC ANTIG 65 AND OLDER CPT-4: 34732      

  10/07/2016

 

                    ADMIN INFLUENZA VIRUS VAC CPT-4:         10/07/2016

 

                    ROUTINE VENIPUNCTURE CPT-4: 28872        2016

 

                    ASSAY OF FREE THYROXINE CPT-4: 72135        2016

 

                    ASSAY THYROID STIM HORMONE CPT-4: 05726        2016

 

                    COMPREHEN METABOLIC PANEL CPT-4: 93142        2016

 

                    COMPLETE CBC W/AUTO DIFF WBC CPT-4: 50904        2016

 

                    LIPID PANEL         CPT-4: 60423        2016

 

                    A1C HPLC            CPT-4: 29641        2016

 

                    URINALYSIS NONAUTO W/O SCOPE CPT-4: 36449        2016

 

                    ROUTINE VENIPUNCTURE CPT-4: 99948        2015

 

                    METABOLIC PANEL TOTAL CA CPT-4: 13316        2015

 

                    PRESCRIP TRANSMIT VIA ERX SY CPT-4:         2015

 

                    FLU VACC PRSV FREE INC ANTIG 65 AND OLDER CPT-4: 95013      

  10/16/2015

 

                    ADMIN INFLUENZA VIRUS VAC CPT-4:         10/16/2015

 

                    URINALYSIS NONAUTO W/O SCOPE CPT-4: 77558        09/15/2015

 

                    URINE CULTURE/ COLONY COUNT CPT-4: 06956        09/15/2015

 

                    ROUTINE VENIPUNCTURE CPT-4: 12003        09/10/2015

 

                    ASSAY OF FREE THYROXINE CPT-4: 42475        09/10/2015

 

                    ASSAY THYROID STIM HORMONE CPT-4: 74942        09/10/2015

 

                    COMPREHEN METABOLIC PANEL CPT-4: 24395        09/10/2015

 

                    COMPLETE CBC W/AUTO DIFF WBC CPT-4: 67045        09/10/2015

 

                    LIPID PANEL         CPT-4: 67418        09/10/2015

 

                    A1C HPLC            CPT-4: 06220        09/10/2015

 

                    CERUM REMOVAL       CPT-4: 74630        2015

 

                    PRESCRIP TRANSMIT VIA ERX SY CPT-4:         2015

 

                    FLUZONE, 5ML (Medicare) CPT-4:         10/17/2014

 

                    ADMIN INFLUENZA VIRUS VAC CPT-4:         10/17/2014

 

                    PRESCRIP TRANSMIT VIA ERX SY CPT-4:         2014

 

                    PRESCRIP TRANSMIT VIA ERX SY CPT-4:         2014

 

                    PRESCRIP TRANSMIT VIA ERX SY CPT-4:         2014

 

                    ROUTINE VENIPUNCTURE CPT-4: 50821        2014

 

                    ASSAY OF FREE THYROXINE CPT-4: 11413        2014

 

                    ASSAY THYROID STIM HORMONE CPT-4: 10053        2014

 

                    COMPREHEN METABOLIC PANEL CPT-4: 31353        2014

 

                    COMPLETE CBC W/AUTO DIFF WBC CPT-4: 11658        2014

 

                    LIPID PANEL         CPT-4: 88250        2014

 

                    A1C HPLC            CPT-4: 18693        2014

 

                    VITAMIN B 12 FOLIC ACID CPT-4: 67556|03866  2014

 

                    PRESCRIP TRANSMIT VIA ERX SY CPT-4:         2014

 

                    PRESCRIP TRANSMIT VIA ERX SY CPT-4:         10/15/2013

 

                    FLUZONE, 5ML (Medicare) CPT-4:         2013

 

                    ADMIN INFLUENZA VIRUS VAC CPT-4:         2013

 

                    PRESCRIP TRANSMIT VIA ERX SY CPT-4:         2013

 

                    PRESCRIP TRANSMIT VIA ERX SY CPT-4:         05/10/2013

 

                    ROUTINE VENIPUNCTURE CPT-4: 86885        2012

 

                    ASSAY OF FREE THYROXINE CPT-4: 94658        2012

 

                    ASSAY THYROID STIM HORMONE CPT-4: 41789        2012

 

                    COMPREHEN METABOLIC PANEL CPT-4: 63083        2012

 

                    COMPLETE CBC W/AUTO DIFF WBC CPT-4: 85854        2012

 

                    LIPID PANEL         CPT-4: 09178        2012

 

                    A1C GLYCOSYLATED HEMOGLOBIN TEST CPT-4: 11157        

012

 

                    CERUM REMOVAL       CPT-4: 74339        2012

 

                    PRESCRIP TRANSMIT VIA ERX SY CPT-4:         2012

 

                    PRESCRIP TRANSMIT VIA ERX SY CPT-4:         10/10/2012

 

                    FLUZONE, 5ML (Medicare) CPT-4:         2012

 

                    ADMIN INFLUENZA VIRUS VAC CPT-4:         2012

 

                    ASSAY, GLUCOSE, BLOOD QUANT CPT-4: 51182        2012

 

                    URINALYSIS NONAUTO W/O SCOPE CPT-4: 49726        2012

 

                    URINE CULTURE/ COLONY COUNT CPT-4: 36354        2012

 

                    ROUTINE VENIPUNCTURE CPT-4: 17979        2012

 

                    ASSAY OF FREE THYROXINE CPT-4: 55377        2012

 

                    ASSAY THYROID STIM HORMONE CPT-4: 77302        2012

 

                    COMPREHEN METABOLIC PANEL CPT-4: 94175        2012

 

                    COMPLETE CBC W/AUTO DIFF WBC CPT-4: 61836        2012

 

                    LIPID PANEL         CPT-4: 05306        2012

 

                    ASSAY OF INSULIN    CPT-4: 45691        2012

 

                    A1C GLYCOSYLATED HEMOGLOBIN TEST CPT-4: 10263        

012

 

                    DRAIN/INJECT JOINT/BURSA CPT-4: 73523        2012

 

                    METHYLPREDNISOLONE 40 MG INJ CPT-4:         2012

 

                    TRIAMCINOLONE ACET INJ NOS CPT-4:         2012

 

                    PRESCRIP TRANSMIT VIA ERX SY CPT-4:         2012

 

                    PRESCRIP TRANSMIT VIA ERX SY CPT-4:         2012

 

                    METHYLPREDNISOLONE 40 MG INJ CPT-4:         2012

 

                    DRAIN/INJECT JOINT/BURSA CPT-4: 75127        2012

 

                    TRIAMCINOLONE ACET INJ NOS CPT-4:         2012

 

                    PRESCRIP TRANSMIT VIA ERX SY CPT-4:         2012

 

                    ROUTINE VENIPUNCTURE CPT-4: 98113        2012

 

                    ASSAY OF FREE THYROXINE CPT-4: 43521        2012

 

                    ASSAY THYROID STIM HORMONE CPT-4: 66016        2012

 

                    COMPREHEN METABOLIC PANEL CPT-4: 99905        2012

 

                    COMPLETE CBC W/AUTO DIFF WBC CPT-4: 29211        2012

 

                    LIPID PANEL         CPT-4: 97309        2012

 

                    PRESCRIP TRANSMIT VIA ERX SY CPT-4:         2012

 

                    CERUM REMOVAL       CPT-4: 58394        2011

 

                    PRESCRIP TRANSMIT VIA ERX SY CPT-4:         2011

 

                    FLUZONE, 5ML (Medicare) CPT-4:         10/05/2011

 

                    ADMIN INFLUENZA VIRUS VAC CPT-4:         10/05/2011

 

                    PRESCRIP TRANSMIT VIA ERX SY CPT-4:         2011

 

                    URINALYSIS NONAUTO W/O SCOPE CPT-4: 01045        2011

 

                    URINE CULTURE/ COLONY COUNT CPT-4: 39906        2011

 

                    CUR TOBACCO NON-USER CPT-4:         2011

 

                    ROUTINE VENIPUNCTURE CPT-4: 64624        2011

 

                    COMPLETE CBC W/AUTO DIFF WBC CPT-4: 50788        2011

 

                    COMPREHEN METABOLIC PANEL CPT-4: 30387        2011

 

                    LIPID PANEL         CPT-4: 48043        2011

 

                    ASSAY THYROID STIM HORMONE CPT-4: 63813        2011

 

                    ASSAY OF FREE THYROXINE CPT-4: 43804        2011

 

                    PRESCRIP TRANSMIT VIA ERX SY CPT-4:         2011

 

                    INJ TRIGGER POINT 1/2 MUSCL CPT-4: 68743        2011

 

                    TRIAMCINOLONE ACET INJ NOS CPT-4:         2011

 

                    METHYLPREDNISOLONE 40 MG INJ CPT-4:         2011

 

                    THER/PROPH/DIAG INJ SC/IM CPT-4: 01405        2011

 

                    KETOROLAC TROMETHAMINE INJ CPT-4:         2011

 

                    PRESCRIP TRANSMIT VIA ERX SY CPT-4:         2010

 

                    FLU VACCINE 3 YRS & > IM UP 64 CPT-4: 50426        10/06/201

0

 

                    ADMIN INFLUENZA VIRUS VAC CPT-4:         10/06/2010

 

                    URINALYSIS NONAUTO W/O SCOPE CPT-4: 92381        2010

 

                    URINE CULTURE/ COLONY COUNT CPT-4: 03356        2010

 

                    PRESCRIP TRANSMIT VIA ERX SY CPT-4:         2010

 

                    THER/PROPH/DIAG INJ SC/IM CPT-4: 50782        2010

 

                    VITAMIN B12 INJECTION CPT-4:         2010

 

                    THER/PROPH/DIAG INJ SC/IM CPT-4: 99229        2010

 

                    VITAMIN B12 INJECTION CPT-4:         2010

 

                    ROUTINE VENIPUNCTURE CPT-4: 75791        2010







Vital Signs





                          Date                      Vital

 

                2020      Blood Pressure 1: 138/64 Code: 8480-6 Heart Rate

 1: 52 bpm 

Respiratory Rate: 18 bpm  SpO2: 98%                 Temperature: 36.3 (C) / 97.4

 (F)

 

                    2020          Blood Pressure 1: 144/82 Code: 8480-6 He

art Rate 1: 56 bpm

 

                2020      Blood Pressure 1: 154/86 Code: 8480-6 Heart Rate

 1: 64 bpm 

Respiratory Rate: 20 bpm SpO2: 99%           Temperature: 37.1 (C) / 98.7 (F) We

ight: 215 

lbs 

 

             2019   Blood Pressure 1: 140/70 Code: 8480-6 Heart Rate 1: 57

 bpm SpO2: 99%    

Temperature: 35.9 (C) / 96.6 (F)        Weight: 215 lbs 

 

                06/10/2019      Blood Pressure 1: 144/70 Code: 8480-6 Heart Rate

 1: 60 bpm 

Respiratory Rate: 20 bpm SpO2: 95%           Temperature: 36.4 (C) / 97.6 (F) We

ight: 214 

lbs 

 

                2019      Blood Pressure 1: 128/78 Code: 8480-6 Heart Rate

 1: 56 bpm 

Respiratory Rate: 20 bpm SpO2: 98%           Temperature: 36.3 (C) / 97.4 (F) We

ight: 213 

lbs 

 

                2018      Blood Pressure 1: 142/60 Code: 8480-6 BMI: 38.2 

Code: 89818-4 Heart 

Rate 1: 48 bpm  Height: 5'2"    Respiratory Rate: 20 bpm SpO2: 98%       Tempera

ture: 36.7

(C) / 98.1 (F)                          Weight: 212 lbs 

 

                2018      Blood Pressure 1: 142/64 Code: 8480-6 BMI: 38.5 

Code: 43168-8 Heart 

Rate 1: 52 bpm  Height: 5'2"    Respiratory Rate: 22 bpm SpO2: 96%       Tempera

ture: 36.1

(C) / 96.9 (F)                          Weight: 214 lbs 

 

                10/02/2018      Blood Pressure 1: 124/80 Code: 8480-6 BMI: 38.3 

Code: 71032-3 Heart 

Rate 1: 68 bpm      Height: 5'2"        Respiratory Rate: 20 bpm Temperature: 36

.3 (C) / 

97.4 (F)                                Weight: 213 lbs 

 

                2018      Blood Pressure 1: 132/78 Code: 8480-6 BMI: 37.6 

Code: 35038-5 Heart 

Rate 1: 68 bpm  Height: 5'2"    Respiratory Rate: 20 bpm SpO2: 97%       Tempera

ture: 36.8

(C) / 98.2 (F)                          Weight: 209 lbs 

 

                2018      Blood Pressure 1: 150/76 Code: 8480-6 BMI: 38.5 

Code: 09822-9 Heart 

Rate 1: 64 bpm  Height: 5'2"    Respiratory Rate: 20 bpm SpO2: 97%       Tempera

ture: 36.2

(C) / 97.2 (F)                          Weight: 214 lbs 

 

                2018      Blood Pressure 1: 122/74 Code: 8480-6 BMI: 38.2 

Code: 09949-2 Heart 

Rate 1: 64 bpm  Height: 5'2"    Respiratory Rate: 18 bpm SpO2: 96%       Tempera

ture: 35.8

(C) / 96.4 (F)                          Weight: 212 lbs 

 

                2018      Blood Pressure 1: 124/78 Code: 8480-6 BMI: 37.8 

Code: 16578-6 Heart 

Rate 1: 76 bpm      Height: 5'2"        Respiratory Rate: 20 bpm Temperature: 36

.8 (C) / 

98.3 (F)                                Weight: 210 lbs 

 

                2018      Blood Pressure 1: 136/70 Code: 8480-6 BMI: 38.0 

Code: 88039-4 Heart 

Rate 1: 68 bpm  Height: 5'2"    Respiratory Rate: 20 bpm SpO2: 97%       Tempera

ture: 36.8

(C) / 98.2 (F)                          Weight: 211 lbs 

 

                2018      Blood Pressure 1: 140/65 Code: 8480-6 Heart Rate

 1: 75 bpm 

Respiratory Rate: 24 bpm SpO2: 95%           Temperature: 37.0 (C) / 98.6 (F) We

ight: 211 

lbs 

 

                2018      Blood Pressure 1: 154/70 Code: 8480-6 BMI: 37.6 

Code: 80914-1 Heart 

Rate 1: 76 bpm  Height: 5'2"    Respiratory Rate: 20 bpm SpO2: 98%       Tempera

ture: 36.9

(C) / 98.5 (F)                          Weight: 209 lbs 

 

                2017      Blood Pressure 1: 156/70 Code: 8480-6 BMI: 37.1 

Code: 89144-5 Heart 

Rate 1: 72 bpm  Height: 5'2"    Respiratory Rate: 20 bpm SpO2: 97%       Tempera

ture: 37.0

(C) / 98.6 (F)                          Weight: 206 lbs 

 

                2017      Blood Pressure 1: 152/78 Code: 8480-6 BMI: 36.8 

Code: 34658-5 Heart 

Rate 1: 78 bpm  Height: 5'2"    Respiratory Rate: 20 bpm SpO2: 98%       Tempera

ture: 36.1

(C) / 97.0 (F)                          Weight: 204 lbs 

 

                2017      Blood Pressure 1: 142/70 Code: 8480-6 BMI: 36.9 

Code: 01537-4 Heart 

Rate 1: 64 bpm  Height: 5'2"    Respiratory Rate: 20 bpm SpO2: 96%       Tempera

ture: 36.5

(C) / 97.7 (F)                          Weight: 205 lbs 

 

                2017      Blood Pressure 1: 142/68 Code: 8480-6 Heart Rate

 1: 88 bpm 

Respiratory Rate: 18 bpm SpO2: 98%           Temperature: 36.3 (C) / 97.3 (F) We

ight: 209 

lbs 

 

                2016      Blood Pressure 1: 130/78 Code: 8480-6 Heart Rate

 1: 68 bpm 

Respiratory Rate: 20 bpm SpO2: 95%           Temperature: 36.4 (C) / 97.6 (F) We

ight: 212 

lbs 

 

                2016      Blood Pressure 1: 122/64 Code: 8480-6 BMI: 39.1 

Code: 45458-8 Heart 

Rate 1: 76 bpm      Height: 5'2"        Respiratory Rate: 20 bpm Temperature: 36

.8 (C) / 

98.2 (F)                                Weight: 217 lbs 

 

                2016      Blood Pressure 1: 144/70 Code: 8480-6 BMI: 39.4 

Code: 45073-0 Heart 

Rate 1: 76 bpm      Height: 5'2"        Respiratory Rate: 20 bpm Temperature: 36

.6 (C) / 

97.9 (F)                                Weight: 219 lbs 

 

                2015      Blood Pressure 1: 152/60 Code: 8480-6 BMI: 39.6 

Code: 99990-2 Heart 

Rate 1: 84 bpm      Height: 5'2"        Respiratory Rate: 20 bpm Temperature: 37

.0 (C) / 

98.6 (F)                                Weight: 220 lbs 

 

                2015      Blood Pressure 1: 146/76 Code: 8480-6 BMI: 39.8 

Code: 95449-1 Heart 

Rate 1: 88 bpm      Height: 5'2"        Respiratory Rate: 20 bpm Temperature: 37

.0 (C) / 

98.6 (F)                                Weight: 221 lbs 

 

                2015      Blood Pressure 1: 132/70 Code: 8480-6 BMI: 39.1 

Code: 15159-1 Heart 

Rate 1: 88 bpm      Height: 5'2"        Respiratory Rate: 20 bpm Temperature: 36

.4 (C) / 

97.6 (F)                                Weight: 217 lbs 

 

                2015      Blood Pressure 1: 132/66 Code: 8480-6 BMI: 39.9 

Code: 43711-0 Heart 

Rate 1: 72 bpm      Height: 5'2"        Respiratory Rate: 20 bpm Temperature: 36

.9 (C) / 

98.4 (F)                                Weight: 218 lbs 

 

                2015      Blood Pressure 1: 136/80 Code: 8480-6 Heart Rate

 1: 76 bpm 

Respiratory Rate: 20 bpm  Temperature: 36.7 (C) / 98.0 (F) Weight: 224 lbs 

 

                2015      Blood Pressure 1: 134/78 Code: 8480-6 BMI: 39.7 

Code: 78380-8 Heart 

Rate 1: 84 bpm      Height: 5'2"        Respiratory Rate: 20 bpm Temperature: 36

.7 (C) / 

98.0 (F)                                Weight: 217 lbs 

 

                2014      Blood Pressure 1: 146/78 Code: 8480-6 BMI: 39.5 

Code: 41585-9 Heart 

Rate 1: 82 bpm      Height: 5'2"        Respiratory Rate: 18 bpm Temperature: 35

.6 (C) / 

96.1 (F)                                Weight: 216 lbs 

 

                2014      Blood Pressure 1: 134/70 Code: 8480-6 BMI: 37.9 

Code: 46862-8 Heart 

Rate 1: 80 bpm      Height: 5'3"        Respiratory Rate: 20 bpm Temperature: 36

.8 (C) / 

98.2 (F)                                Weight: 214 lbs 

 

                2014      Blood Pressure 1: 132/70 Code: 8480-6 BMI: 37.6 

Code: 36691-4 Heart 

Rate 1: 80 bpm      Height: 5'3"        Respiratory Rate: 20 bpm Temperature: 36

.8 (C) / 

98.3 (F)                                Weight: 212 lbs 

 

                2014      Blood Pressure 1: 116/74 Code: 8480-6 Heart Rate

 1: 68 bpm 

Respiratory Rate: 20 bpm  Temperature: 36.2 (C) / 97.1 (F) Weight: 212 lbs 

 

                10/15/2013      Blood Pressure 1: 132/82 Code: 8480-6 BMI: 37.4 

Code: 77130-2 Heart 

Rate 1: 76 bpm      Height: 5'3"        Respiratory Rate: 20 bpm Temperature: 36

.7 (C) / 

98.0 (F)                                Weight: 211 lbs 

 

                    2013          Blood Pressure 1: 130/76 Code: 8480-6 He

art Rate 1: 78 bpm

 

                2013      Blood Pressure 1: 140/82 Code: 8480-6 BMI: 36.8 

Code: 14397-5 Heart 

Rate 1: 66 bpm      Height: 5'3"        Respiratory Rate: 20 bpm Temperature: 36

.1 (C) / 

96.9 (F)                                Weight: 208 lbs 

 

                2013      Blood Pressure 1: 138/80 Code: 8480-6 BMI: 36.4 

Code: 76869-2 Heart 

Rate 1: 72 bpm      Height: 5'4"        Respiratory Rate: 20 bpm Temperature: 36

.7 (C) / 

98.0 (F)                                Weight: 212 lbs 

 

                2013      Blood Pressure 1: 124/70 Code: 8480-6 BMI: 36.9 

Code: 08897-3 Heart 

Rate 1: 60 bpm      Height: 5'4"        Temperature: 36.1 (C) / 97.0 (F) Weight:

 215 lbs 

 

                05/10/2013      Blood Pressure 1: 132/86 Code: 8480-6 BMI: 36.9 

Code: 65808-3 Heart 

Rate 1: 76 bpm      Height: 5'4"        Respiratory Rate: 20 bpm Temperature: 36

.8 (C) / 

98.2 (F)                                Weight: 215 lbs 

 

                2013      Blood Pressure 1: 134/82 Code: 8480-6 BMI: 36.6 

Code: 88311-9 Heart 

Rate 1: 72 bpm      Height: 5'4"        Respiratory Rate: 20 bpm Temperature: 36

.3 (C) / 

97.4 (F)                                Weight: 213 lbs 

 

                2012      Blood Pressure 1: 142/80 Code: 8480-6 BMI: 37.1 

Code: 49727-0 Heart 

Rate 1: 76 bpm      Height: 5'4"        Respiratory Rate: 20 bpm Temperature: 36

.8 (C) / 

98.3 (F)                                Weight: 216 lbs 

 

                10/23/2012      Blood Pressure 1: 128/68 Code: 8480-6 BMI: 37.6 

Code: 49236-6 Heart 

Rate 1: 72 bpm      Height: 5'4"        Respiratory Rate: 20 bpm Temperature: 36

.6 (C) / 

97.9 (F)                                Weight: 219 lbs 

 

                10/10/2012      Blood Pressure 1: 122/70 Code: 8480-6 Heart Rate

 1: 76 bpm 

Respiratory Rate: 20 bpm  Temperature: 36.7 (C) / 98.1 (F) Weight: 218 lbs 

 

                    2012          Blood Pressure 1: 132/80 Code: 8480-6 He

art Rate 1: 84 bpm

 

                2012      Blood Pressure 1: 128/78 Code: 8480-6 BMI: 38.1 

Code: 18225-7 Heart 

Rate 1: 84 bpm      Height: 5'4"        Respiratory Rate: 20 bpm Temperature: 36

.9 (C) / 

98.4 (F)                                Weight: 222 lbs 

 

                2012      Blood Pressure 1: 138/80 Code: 8480-6 BMI: 37.9 

Code: 31502-0 Heart 

Rate 1: 74 bpm      Height: 5'4"        Temperature: 36.1 (C) / 97.0 (F) Weight:

 221 lbs 

 

                2012      Blood Pressure 1: 126/78 Code: 8480-6 BMI: 38.4 

Code: 87333-8 Heart 

Rate 1: 72 bpm      Height: 5'4"        Respiratory Rate: 20 bpm Temperature: 36

.7 (C) / 

98.0 (F)                                Weight: 224 lbs 

 

                2012      Blood Pressure 1: 138/72 Code: 8480-6 BMI: 38.4 

Code: 18390-3 Heart 

Rate 1: 72 bpm      Height: 5'4"        Respiratory Rate: 20 bpm Temperature: 36

.6 (C) / 

97.9 (F)                                Weight: 224 lbs 

 

                2012      Blood Pressure 1: 122/78 Code: 8480-6 BMI: 38.8 

Code: 82390-1 Heart 

Rate 1: 88 bpm      Height: 5'4"        Respiratory Rate: 20 bpm Temperature: 36

.6 (C) / 

97.8 (F)                                Weight: 226 lbs 

 

                2012      Blood Pressure 1: 116/60 Code: 8480-6 BMI: 38.1 

Code: 10630-3 Heart 

Rate 1: 92 bpm      Height: 5'4"        Respiratory Rate: 20 bpm Temperature: 36

.8 (C) / 

98.2 (F)                                Weight: 222 lbs 

 

                2011      Blood Pressure 1: 118/62 Code: 8480-6 BMI: 37.9 

Code: 75403-5 Heart 

Rate 1: 80 bpm      Height: 5'4"        Temperature: 36.5 (C) / 97.7 (F) Weight:

 221 lbs 

 

                2011      Blood Pressure 1: 132/70 Code: 8480-6 Heart Rate

 1: 84 bpm 

Respiratory Rate: 20 bpm  Temperature: 36.7 (C) / 98.0 (F) Weight: 221 lbs 

 

                2011      Blood Pressure 1: 114/72 Code: 8480-6 BMI: 37.6 

Code: 34894-6 Heart 

Rate 1: 76 bpm      Height: 5'4"        Respiratory Rate: 20 bpm Temperature: 36

.8 (C) / 

98.3 (F)                                Weight: 219 lbs 

 

                    2011          Blood Pressure 1: 136/76 Code: 8480-6 Te

mperature: 36.0 (C) / 96.8 

(F)                                     Weight: 219 lbs 8 oz

 

                2011      Blood Pressure 1: 128/80 Code: 8480-6 Heart Rate

 1: 72 bpm 

Temperature: 36.3 (C) / 97.4 (F)        Weight: 218 lbs 

 

                2011      Blood Pressure 1: 126/70 Code: 8480-6 Heart Rate

 1: 72 bpm 

Temperature: 36.7 (C) / 98.0 (F)

 

                    2010          Blood Pressure 1: 126/78 Code: 8480-6 Te

mperature: 36.2 (C) / 97.1 

(F)                                     Weight: 217 lbs 8 oz

 

                2010      Blood Pressure 1: 116/70 Code: 8480-6 Heart Rate

 1: 72 bpm 

Temperature: 36.4 (C) / 97.6 (F)        Weight: 222 lbs 

 

                2010      Blood Pressure 1: 114/78 Code: 8480-6 Heart Rate

 1: 72 bpm 

Temperature: 37.1 (C) / 98.8 (F)        Weight: 225 lbs 

 

                2010      Blood Pressure 1: 128/78 Code: 8480-6 Heart Rate

 1: 76 bpm 

Temperature: 36.6 (C) / 97.8 (F)        Weight: 224 lbs 

 

                2010      Blood Pressure 1: 116/78 Code: 8480-6 Heart Rate

 1: 80 bpm 

Temperature: 36.4 (C) / 97.6 (F)        Weight: 226 lbs 

 

                2010      Blood Pressure 1: 120/70 Code: 8480-6 BMI: 38.3 

Code: 28267-8 Heart 

Rate 1: 88 bpm      Height: 5'5"        Temperature: 36.4 (C) / 97.6 (F) Weight:

 230 lbs 







Functional Status

No Functional Status data



Reason For Visit





                    Reason For Visit    Effective Dates     Notes

 

                    follow up           2020           

 

                    blood pressure check 2020           

 

                    high blood pressure 2020           

 

                    lab draw            2019           

 

                    lab draw            10/11/2019           

 

                    hearing loss        2019          left ear

 

                    follow up           06/10/2019           

 

                    follow up           2019          Patient has upcoming

 appointment with Dr Claire and carotid 

dopplers tomorrow

 

                    follow up           2018          Patient had heart ca

th on 10-30-18 and one of the bypass 

veins in spasming

 

                    follow up           2018          4 Week

 

                    follow up           10/02/2018           

 

                    follow up           2018           

 

                    high blood pressure 2018          Dr Claire has ordered

 holter monitor and 

hydralazine 50mg PRN

 

                    dizziness           2018          On  morning darren delvalle woke up and went to the bathroom 

and after lying down became very dizzy. Patient has hx of vertigo so didn't 
think anything of it. She usually just has them intermittently, but had more 
that day. While at [employer] began to feel very ill and became very shaky. She got 
home and sat in the recliner and had the jittery/nervous feeling. Later that 
night it finally resolved. Patient feels like she had a spell like this around 
the  and thought it was due to extreme heat exhaustion.

 

                    follow up           2018           

 

                    back pain           2018           

 

                    otalgia             2018           

 

                    back pain           2018           

 

                    injection(s)        10/06/2017          flu shot

 

                    lab draw            2017           

 

                    follow up           2017           

 

                    pelvic pain         2017          Patient states pain 

started in May with a "Constant Ache"

to left inguinal area. Patient states at that time pain was intermittent. Then, 
approximately 2nd week  of  pain worsened and radiates to left low back 
area.

 

                    lab draw            2017           

 

                    hyperglycemia       2017           

 

                    cough               2017           

 

                    otalgia             2016           

 

                    injection(s)        10/07/2016          Influenza

 

                    lab draw            2016           

 

                    constipation        2016           

 

                    flank pain          2016           

 

                    follow up           2015          3mo fwup

 

                    injection(s)        10/16/2015          Influenza

 

                    acute renal failure 09/15/2015           

 

                    lab draw            09/10/2015           

 

                    fatigue             2015           

 

                    otalgia             2015           

 

                    pain, limb          2015           

 

                    follow up           2015          [facility] fwup

 

                    high blood pressure 2015           

 

                    injection(s)        10/17/2014          flu shot

 

                    sinusitis           2014           

 

                    high blood pressure 2014           

 

                    cough               2014          Was panfilo at Dr Tyree teixeira's office so he started he on amlodipine 

10mg but seems to be dragging her down. Patient decreased to 1/2 tablet this 
last week

 

                    lab draw            2014           

 

                    cough               2014           

 

                    cough               10/15/2013           

 

                    high blood pressure 2013           

 

                    follow up           2013          ER

 

                    dizziness           2013           

 

                    follow up           2013           

 

                    otalgia             05/10/2013           

 

                    breast complaint    2013           

 

                    lab draw            2012           

 

                    follow up           2012          2mo fwup

 

                    follow up           10/23/2012          2wk fwup

 

                    gas and bloating    10/10/2012          Dr Claire has her mon

itoring because was high in his 

office at last visit

 

                    injection(s)        2012           

 

                    dizziness           2012           

 

                    dizziness           2012           

 

                    lab draw            2012           

 

                    muscle weakness     2012          concerns of simvasta

tin with fatigue and muscle 

weakness

 

                    leg pain/sciatica   2012           

 

                    hip pain            2012           

 

                    back pain           2012          has tried ice pack, 

muscle relaxers, and moist heat

 

                    back pain           2012           

 

                    fatigue             2012          in hands/feet

 

                    otalgia             2011           

 

                    follow up           2011          2wk fwup

 

                    back pain           2011          thinks ulcer may hav

e returned

 

                    lab draw            2011           

 

                    dizziness           2011          Left ear and facial 

pain, right ear pain 

 

                    follow up           2011          1wk fwup

 

                    hip pain            2011          feels very draggy, corie hebert, BP 80/63, normally running 

100's/60's

 

                    chills              2010           

 

                    injection(s)        10/06/2010          flu shot

 

                    follow up           2010          6wk fwup, had HH rep

aired and is starting to feel better, 

started on reglan 10mg tid

 

                    follow up           2010          hosp fwup

 

                    follow up           2010          1mo fwup, saw Dr Danna du and hasn't gave cardiac clearance 

yet for HH repair, did have chemical stress test yesterday and waiting on 
results

 

                    follow up           2010          from EGD/colonoscopy

--has Hiatal Hernia and wants to do 

repair

 

                    follow up           2010           







Encounters





             Encounter    Performer    Location     Codes        Date

 

                                        () OFFICE/OUTPATIENT VISIT EST

Diagnosis: Essential (primary) hypertension[ICD10: I10]

Diagnosis: Palpitations[ICD10: R00.2] Akanksha BAKER OR

[facility] 

[place]                    CPT-4: 24779              2020

 

                                        ([id number]) OFFICE/OUTPATIENT VISIT EST

Diagnosis: Essential hypertension[ICD10: I10]

Diagnosis: Palpitations[ICD10: R00.2]

Diagnosis: Stress reaction[ICD10: F43.0] Akanksha BAKER

 

XIANG[facility] [place]            CPT-4: 53339              2020

 

                                        ([id number]) NURSE/OUTPATIENT VISIT EST

Diagnosis: Mixed hyperlipidemia[ICD10: E78.2] Akanksha BAKER 

[facility] [place]            CPT-4: 33978              2019

 

                                        ([id number]) NURSE/OUTPATIENT VISIT EST

Diagnosis: FLU VACCINE[ICD10: Z23] Akanksha MCKEONND

ER [facility]                       CPT-4: 56271              10/22/2019

 

                                        ([id number]) NURSE/OUTPATIENT VISIT EST

Diagnosis: Chronic kidney disease, stage 1[ICD10: N18.1] Akanksha SPENCERER DO [employer] CPT-4: 13307              10/11/2019

 

                                        (94953) OFFICE/OUTPATIENT VISIT EST

Diagnosis: Acute serous otitis media, left ear[ICD10: H65.02] Nat Darell BAKER XIANGNDER [facility] CPT-4: 77987              2019

 

                                        (51173) OFFICE/OUTPATIENT VISIT EST

Diagnosis: Essential (primary) hypertension[ICD10: I10]

Diagnosis: Coronary atherosclerosis due to calcified coronary lesion[ICD10: 
I25.84]

Diagnosis: Hypoglycemia, unspecified[ICD10: E16.2]

Diagnosis: Other fatigue[ICD10: R53.83]

Diagnosis: Urinary tract infection, site not specified[ICD10: N39.0] Akanksha DRIVER [facility] CPT-4: 87163        06/10/2019

 

                                        (50102) OFFICE/OUTPATIENT VISIT EST

Diagnosis: Essential (primary) hypertension[ICD10: I10]

Diagnosis: Gastro-esophageal reflux disease without esophagitis[ICD10: K21.9]

Diagnosis: Urinary tract infection, site not specified[ICD10: N39.0] Akanksha DRIVER [employer] CPT-4: 77939        2019

 

                                        (33140) OFFICE/OUTPATIENT VISIT EST

Diagnosis: Gastro-esophageal reflux disease without esophagitis[ICD10: K21.9]

Diagnosis: Epigastric pain[ICD10: R10.13] Akanksha [facility]            CPT-4: 39102              2018

 

                                        (47892) OFFICE/OUTPATIENT VISIT EST

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

Diagnosis: Essential (primary) hypertension[ICD10: I10]

Diagnosis: Generalized anxiety disorder[ICD10: F41.1]

Diagnosis: Gastro-esophageal reflux disease without esophagitis[ICD10: K21.9] 

Akanksha DRIVER [employer] CPT-4: 59520        2018

 

                                        OFFICE/OUTPATIENT VISIT EST

Diagnosis: Gastro-esophageal reflux disease without esophagitis[ICD10: K21.9]

Diagnosis: Palpitations[ICD10: R00.2]

Diagnosis: Urinary tract infection, site not specified[ICD10: N39.0]

Diagnosis: Generalized anxiety disorder[ICD10: F41.1] Akanksha DRIVRE [employer] CPT-4: 18784              10/02/2018

 

                                        (93528) NURSE/OUTPATIENT VISIT EST

Diagnosis: Coronary atherosclerosis due to calcified coronary lesion[ICD10: 
I25.84]

Diagnosis: Hypoglycemia, unspecified[ICD10: E16.2]

Diagnosis: Dizziness and giddiness[ICD10: R42]

Diagnosis: Occlusion and stenosis of bilateral carotid arteries[ICD10: I65.23] 

Akanksha DRIVER [facility] CPT-4: 79049        2018

 

                                        OFFICE/OUTPATIENT VISIT EST

Diagnosis: Epigastric pain[ICD10: R10.13]

Diagnosis: Generalized anxiety disorder[ICD10: F41.1]

Diagnosis: FLU VACCINE[ICD10: Z23] Akanksha KEY [facility]                       CPT-4: 35588              2018

 

                                        (52271) OFFICE/OUTPATIENT VISIT EST

Diagnosis: Essential (primary) hypertension[ICD10: I10]

Diagnosis: Hypoglycemia, unspecified[ICD10: E16.2]

Diagnosis: Gastro-esophageal reflux disease without esophagitis[ICD10: K21.9] 

[facility]: 69219        2018

 

                                        (21380) OFFICE/OUTPATIENT VISIT EST

Diagnosis: Dizziness and giddiness[ICD10: R42] [facility]            CPT-4: 30045              2018

 

                                        (39635) OFFICE/OUTPATIENT VISIT EST

Diagnosis: Cervicalgia[ICD10: M54.2]

Diagnosis: Other spondylosis with radiculopathy, cervical region[ICD10: M47.22] 

[facility] CPT-[facility]: 04141        2018

 

                                        (90460) OFFICE/OUTPATIENT VISIT EST

Diagnosis: Hypoglycemia, unspecified[ICD10: E16.2]

Diagnosis: Other spondylosis with radiculopathy, cervical region[ICD10: M47.22]

Diagnosis: Vertigo of central origin, bilateral[ICD10: H81.43]

Diagnosis: Cervicocranial syndrome[ICD10: M53.0] Akanksha DRIVER [facility]         CPT-4: 65544              2018

 

                                        (23060) OFFICE/OUTPATIENT VISIT EST

Diagnosis: Benign paroxysmal vertigo, bilateral[ICD10: H81.13]

Diagnosis: Otalgia, bilateral[ICD10: H92.03] Nat Lozoya          NYARAMOS CHEATHAMAramis 

[place] [facility]            CPT-4: 19972              2018

 

                                        (95104) OFFICE/OUTPATIENT VISIT EST

Diagnosis: Low back pain[ICD10: M54.5]

Diagnosis: Radiculopathy, lumbosacral region[ICD10: M54.17]

Diagnosis: Left lower quadrant pain[ICD10: R10.32] Akanksha DE LA ROSA

SAramis [place] [facility]         CPT-4: 72825              2018

 

                                        (45773) OFFICE/OUTPATIENT VISIT EST

Diagnosis: FLU VACCINE[ICD10: Z23] Akanksha BAUMAN SAramis TJ KEY [facility]                       CPT-4: 00758              10/06/2017

 

                                        (19288) OFFICE/OUTPATIENT VISIT EST

Diagnosis: Mixed hyperlipidemia[ICD10: E78.2]

Diagnosis: Essential (primary) hypertension[ICD10: I10]

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

Diagnosis: Impaired fasting glucose[ICD10: R73.01]

Diagnosis: Other fatigue[ICD10: R53.83] Akanksha BAUMAN SAramis 

[place]ILL[place]

[facility]                    CPT-4: 13867              2017

 

                                        (60500) OFFICE/OUTPATIENT VISIT EST

Diagnosis: Pain in thoracic spine[ICD10: M54.6]

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 

[facility]anksha [facility] CPT-4: 96003        2017

 

                                        (65692) OFFICE/OUTPATIENT VISIT EST

Diagnosis: Left lower quadrant pain[ICD10: R10.32]

Diagnosis: Low back pain[ICD10: M54.5] Akanksha SYKES 

[facility]                    CPT-4: 44457              2017

 

                                        (60556) OFFICE/OUTPATIENT VISIT EST

Diagnosis: Mixed hyperlipidemia[ICD10: E78.2]

Diagnosis: Essential (primary) hypertension[ICD10: I10]

Diagnosis: Hypoglycemia, unspecified[ICD10: E16.2] Akankshatammy DRIVER [facility]         CPT-4: 61738              2017

 

                                        (71796) OFFICE/OUTPATIENT VISIT EST

Diagnosis: Impaired fasting glucose[ICD10: R73.01]

Diagnosis: Mastodynia[ICD10: N64.4]

Diagnosis: Mixed hyperlipidemia[ICD10: E78.2]

Diagnosis: Essential (primary) hypertension[ICD10: I10]

Diagnosis: Other fatigue[ICD10: R53.83] Akanksha DRIVER

[facility]                    CPT-4: 20877              2017

 

                                        (77770) OFFICE/OUTPATIENT VISIT EST

Diagnosis: Acute upper respiratory infection, unspecified[ICD10: J06.9] Luba DRIVER [facility] CPT-4: 65953        2017

 

                                        (26505) OFFICE/OUTPATIENT VISIT EST

Diagnosis: Acute mastoiditis without complications, right ear[ICD10: H70.001] 

Akanksha DRIVER [facility] CPT-4: 44692        2016

 

                                        (52851) OFFICE/OUTPATIENT VISIT EST

Diagnosis: FLU VACCINE[ICD10: Z23] Akanksha KEY [facility]                       CPT-4: 42596              10/07/2016

 

                                        (04427) OFFICE/OUTPATIENT VISIT EST

Diagnosis: Mixed hyperlipidemia[ICD10: E78.2]

Diagnosis: Essential (primary) hypertension[ICD10: I10]

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

Diagnosis: Impaired fasting glucose[ICD10: R73.01] Akanksha Xiangndyesi DRIVER [facility]         CPT-4: 32696              2016

 

                                        (42919) OFFICE/OUTPATIENT VISIT EST

Diagnosis: Constipation, unspecified[ICD10: K59.00] Akanksha DRIVER [facility] CPT-4: 66430              2016

 

                                        (81567) OFFICE/OUTPATIENT VISIT EST

Diagnosis: Essential (primary) hypertension[ICD10: I10]

Diagnosis: Mixed hyperlipidemia[ICD10: E78.2]

Diagnosis: Chronic kidney disease, stage 1[ICD10: N18.1] Akanksha DRIVER [facility] CPT-4: 18833              2016

 

                                        (02615) OFFICE/OUTPATIENT VISIT EST

Diagnosis: Muscle weakness (generalized)[ICD10: M62.81]

Diagnosis: Dizziness and giddiness[ICD10: R42]

Diagnosis: Essential (primary) hypertension[ICD10: I10]

Diagnosis: History of falling[ICD10: Z91.81] Akanksha DRIVER [facility]            CPT-4: 15231              2015

 

                                        (20904) OFFICE/OUTPATIENT VISIT EST

Diagnosis: FLU VACCINE[ICD10: Z23] Akanksha KEY [facility]                       CPT-4: 63983              10/16/2015

 

                                        (94114) OFFICE/OUTPATIENT VISIT EST

Diagnosis: Acute renal failure[ICD9: 584.9]

Diagnosis: POLYURIA[ICD9: 788.42] Akanksha LANCE [facility]                       CPT-4: 86204              09/15/2015

 

                                        (18778) OFFICE/OUTPATIENT VISIT EST

Diagnosis: HYPERLIPIDEMIA NEC/NOS[ICD9: 272.4]

Diagnosis: HYPERTENSION[ICD9: 401.9]

Diagnosis: CAD[ICD9: 414.00]

Diagnosis: Hyperglycemia[ICD9: 790.29]

Diagnosis: MALAISE AND FATIGUE[ICD9: 780.79] Akanksha DRIVER DO [facility]            CPT-4: 83981              09/10/2015

 

                                        (45409) OFFICE/OUTPATIENT VISIT EST

Diagnosis: MALAISE AND FATIGUE[ICD9: 780.79]

Diagnosis: HYPOGLYCEMIA[ICD9: 251.2]

Diagnosis: Grieving[ICD9: 309.0]

Diagnosis: ABDOMINAL PAIN[ICD9: 789.00] Akanksha DRIVER

[facility] [facility]                    CPT-4: 27944              2015

 

                                        OFFICE/OUTPATIENT VISIT EST

Diagnosis: CERUMEN IMPACTION[ICD9: 380.4]

Diagnosis: EUSTACHIAN TUBE DYSFUNCTION[ICD9: 381.81] Bertha Elieser      

AKANKSHA DRIVER [facility] CPT-4: 47907              2015

 

                                        (24988) OFFICE/OUTPATIENT VISIT EST

Diagnosis: Leg pain[ICD9: 729.5]

Diagnosis: SUPERFIC PHLEBITIS-LEG[ICD9: 451.0] Akanksha DRIVER [facility]            CPT-4: 47525              2015

 

                                        (90432) OFFICE/OUTPATIENT VISIT EST

Diagnosis: Thoracic back pain[ICD9: 724.1]

Diagnosis: SPASM OF MUSCLE[ICD9: 728.85] Akanksha DRIVER [facility]            CPT-4: 86328              2015

 

                                        (68316) OFFICE/OUTPATIENT VISIT EST

Diagnosis: DIZZINESS/VERTIGO[ICD9: 780.4]

Diagnosis: Benign positional vertigo[ICD9: 386.11]

Diagnosis: HYPERTENSION[ICD9: 401.9]

Diagnosis: Suspicious nevus[ICD9: 238.2] [facility]            CPT-4: 02867              2015

 

                                        (59452) OFFICE/OUTPATIENT VISIT EST

Diagnosis: FLU VACCINE[ICD10: Z23] Akanksha SPENCER

[facility]                       CPT-4: 70441              10/17/2014

 

                                        OFFICE/OUTPATIENT VISIT EST

Diagnosis: SINUSITIS, ACUTE[ICD9: 461.9]

Diagnosis: EUSTACHIAN TUBE DYSFUNCTION[ICD9: 381.81] Bertha Mon      

[facility]ANKSHA [facility] CPT-4: 69667              2014

 

                                        (65663) OFFICE/OUTPATIENT VISIT EST

Diagnosis: DIZZINESS/VERTIGO[ICD9: 780.4]

Diagnosis: HYPERTENSION[ICD9: 401.9] Akanksha MEJIA [facility]                       CPT-4: 75759              2014

 

                                        (33899) OFFICE/OUTPATIENT VISIT EST

Diagnosis: HYPERTENSION[ICD9: 401.9]

Diagnosis: MALAISE AND FATIGUE[ICD9: 780.79]

Diagnosis: SINUSITIS, ACUTE[ICD9: 461.9] [facility]            CPT-4: 42394              2014

 

                                        (13517) OFFICE/OUTPATIENT VISIT EST

Diagnosis: HYPERLIPIDEMIA NEC/NOS[ICD9: 272.4]

Diagnosis: HYPERTENSION[ICD9: 401.9]

Diagnosis: B12 DEFIC ANEMIA NEC[ICD9: 281.1]

Diagnosis: HYPOGLYCEMIA[ICD9: 251.2] Akanksha MEJIA [facility]                       CPT-4: 24478              2014

 

                                        OFFICE/OUTPATIENT VISIT EST

Diagnosis: COUGH[ICD9: 786.2] Bertha DRIVER [facility] 

CPT-4: 07066                            2014

 

                                        OFFICE/OUTPATIENT VISIT EST

Diagnosis: COUGH[ICD9: 786.2]

Diagnosis: URI, ACUTE[ICD9: 465.9] Bertha SPENCER

[facility]                       CPT-4: 31905              10/15/2013

 

                                        (39436) OFFICE/OUTPATIENT VISIT EST

Diagnosis: HYPERTENSION[ICD9: 401.9]

Diagnosis: CEPHALGIA[ICD9: 784.0]

Diagnosis: ANXIETY STATE NOS[ICD9: 300.00]

Diagnosis: FLU VACCINE[ICD9: V04.81] Akanksha MCKEON

[facility]                       CPT-4: 34735              2013

 

                                        (78347) OFFICE/OUTPATIENT VISIT EST

Diagnosis: DIZZINESS/VERTIGO[ICD9: 780.4]

Diagnosis: SINUSITIS, ACUTE[ICD9: 461.9]

Diagnosis: Benign positional vertigo[ICD9: 386.11] [facility]         CPT-4: 80691              2013

 

                                        OFFICE/OUTPATIENT VISIT EST

Diagnosis: OTITIS MEDIA NOS[ICD9: 382.9] Akanksha Xiangndyesi DRIVER [facility]            CPT-4: 61658              2013

 

                                        OFFICE/OUTPATIENT VISIT EST

Diagnosis: Perforation of ear drum[ICD9: 384.20]

Diagnosis: SINUSITIS, ACUTE[ICD9: 461.9] [facility]            CPT-4: 16621              05/10/2013

 

                                        (22157) OFFICE/OUTPATIENT VISIT EST

Diagnosis: Mastalgia[ICD9: 611.71] Akanksha Villa SPENCER

[facility]                       CPT-4: 48867              2013

 

                                        (62602) OFFICE/OUTPATIENT VISIT EST

Diagnosis: HYPERLIPIDEMIA NEC/NOS[ICD9: 272.4]

Diagnosis: HYPERTENSION[ICD9: 401.9]

Diagnosis: DIZZINESS/VERTIGO[ICD9: 780.4]

Diagnosis: Hyperglycemia[ICD9: 790.29]

Diagnosis: MALAISE AND FATIGUE[ICD9: 780.79] Akanksha Xiangroberto FAY

SHERRY CHEATHAMAramis 

[facility]            CPT-4: 21606              2012

 

                                        (29999) OFFICE/OUTPATIENT VISIT EST

Diagnosis: EUSTACHIAN TUBE DYSFUNCTION[ICD9: 381.81]

Diagnosis: DIZZINESS/VERTIGO[ICD9: 780.4]

Diagnosis: ABDOMINAL PAIN[ICD9: 789.00]

Diagnosis: GERD[ICD9: 530.81]

Diagnosis: CERUMEN IMPACTION[ICD9: 380.4] Akanksharj BAUMAN S

Aramis 

[facility]            CPT-4: 25150              2012

 

                                        OFFICE/OUTPATIENT VISIT EST

Diagnosis: ABDOMINAL PAIN[ICD9: 789.00]

Diagnosis: DYSPEPSIA[ICD9: 536.8] Akanksharj BAUMAN SAramis XIANGLORNA LANCE [facility]                       CPT-4: 54288              10/23/2012

 

                                        (13091) OFFICE/OUTPATIENT VISIT EST

Diagnosis: ABDOMINAL PAIN[ICD9: 789.00]

Diagnosis: DYSPEPSIA[ICD9: 536.8]

Diagnosis: IBS[ICD9: 564.1] Akanksha Xiangroberto BAUMAN SAramis [facility] CPT

-

4: 13737                                10/10/2012

 

                                        OFFICE/OUTPATIENT VISIT EST

Diagnosis: DIZZINESS/VERTIGO[ICD9: 780.4]

Diagnosis: HYPOGLYCEMIA[ICD9: 251.2] Akanksha BAUMAN SAramis XIANG

ROBERTO [facility]                       CPT-4: 77397              2012

 

                                        (69091) OFFICE/OUTPATIENT VISIT EST

Diagnosis: URINARY TRACT INFECTION[ICD9: 599.0] Akanksha Orender        KATJA

LINE SAramis

[facility]            CPT-4: 74235              2012

 

                                        (49988) OFFICE/OUTPATIENT VISIT EST

Diagnosis: HYPERLIPIDEMIA NEC/NOS[ICD9: 272.4]

Diagnosis: HYPERTENSION[ICD9: 401.9]

Diagnosis: MALAISE AND FATIGUE[ICD9: 780.79]

Diagnosis: DIZZINESS/VERTIGO[ICD9: 780.4] Akanksha [facility]            CPT-4: 36194              2012

 

                                        (70039) OFFICE/OUTPATIENT VISIT EST

Diagnosis: DIZZINESS/VERTIGO[ICD9: 780.4]

Diagnosis: MALAISE AND FATIGUE[ICD9: 780.79]

Diagnosis: HYPERTENSION[ICD9: 401.9] Akanksha MEJIA [facility]                       CPT-4: 95284              2012

 

                                        OFFICE/OUTPATIENT VISIT EST

Diagnosis: LUMB/LUMBOSAC DISC DEGEN[ICD9: 722.52]

Diagnosis: Radiculopathy of leg[ICD9: 724.4]

Diagnosis: SACROILIITIS NEC[ICD9: 720.2]

Diagnosis: SPINAL ENTHESOPATHY[ICD9: 720.1] Akanksha DRIVER [facility]            CPT-4: 75354              2012

 

                                        (84115) OFFICE/OUTPATIENT VISIT EST

Diagnosis: PAIN, LOWER BACK[ICD9: 724.2]

Diagnosis: SPASM OF MUSCLE[ICD9: 728.85]

Diagnosis: SCIATICA[ICD9: 724.3]

Diagnosis: Lumbar degenerative disc disease[ICD9: 722.52] Akanksha DRIVER [facility] CPT-4: 30894              2012

 

                                        (32269) OFFICE/OUTPATIENT VISIT EST

Diagnosis: PAIN IN THORACIC SPINE[ICD9: 724.1]

Diagnosis: SPASM OF MUSCLE[ICD9: 728.85] Akanksha DRIVER [facility]            CPT-4: 21125              2012

 

                                        (17461) OFFICE/OUTPATIENT VISIT EST

Diagnosis: PAIN, LOWER BACK[ICD9: 724.2]

Diagnosis: SPASM OF MUSCLE[ICD9: 728.85]

Diagnosis: Sacroiliac dysfunction[ICD9: 739.4]

Diagnosis: Lumbar degenerative disc disease[ICD9: 722.52] Akanksha DRIVER [facility] CPT-4: 69682              2012

 

                                        OFFICE/OUTPATIENT VISIT EST

Diagnosis: MALAISE AND FATIGUE[ICD9: 780.79]

Diagnosis: HYPOTENSION[ICD9: 458.9]

Diagnosis: CAD[ICD9: 414.00] Akanksha SPENCERER  [facility] 

CPT-4: 48678                            2012

 

                                        OFFICE/OUTPATIENT VISIT EST

Diagnosis: SINUSITIS, ACUTE[ICD9: 461.9]

Diagnosis: PHARYNGITIS, ACUTE[ICD9: 462]

Diagnosis: CERUMEN IMPACTION[ICD9: 380.4] Akanksha [facility]            CPT-4: 52902              2011

 

                                        OFFICE/OUTPATIENT VISIT EST

Diagnosis: PAIN IN THORACIC SPINE[ICD9: 724.1]

Diagnosis: GERD[ICD9: 530.81]

Diagnosis: DIZZINESS/VERTIGO[ICD9: 780.4] Akanksha [facility]            CPT-4: 36038              2011

 

                OFFICE/OUTPATIENT VISIT EST Akanksha MCKEON

NDER DO [facility] CPT-

4: 41245                                2011

 

                    (98951) OFFICE/OUTPATIENT VISIT EST Akanksha CASTILLO SAramis ORENDER DO 

[facility]                       CPT-4: 29480              2011

 

                                        (24317) OFFICE/OUTPATIENT VISIT, EST

Diagnosis: PAIN, LOWER BACK[ICD9: 724.2] Akanksha BAUMAN SAramis

 

[facility]            CPT-4: 86754              2011

 

                    (76751) OFFICE/OUTPATIENT VISIT, EST Akanksha Villa DE LA ROSA S. ORENDER DO

[facility]                       CPT-4: 80127              2011

 

                    (33214) OFFICE/OUTPATIENT VISIT, EST Akanksharj DE LA ROSA S. ORENDER DO

[facility]                       CPT-4: 26097              2010

 

                    (19566) OFFICE/OUTPATIENT VISIT, EST Akanksha Xiangndyesi  DORITA

RJ S. ORENDER DO

[facility]                       CPT-4: 26798              2010

 

                    (51997) OFFICE/OUTPATIENT VISIT, EST Akanksha Xiangndyesi  DORITA

RJ S. ORENDER DO

[facility]                       CPT-4: 46357              2010

 

                    (83215) OFFICE/OUTPATIENT VISIT, EST Akankshatammy DRIVER DO

[employer]                       CPT-4: 68527              2010

 

                    (15651) OFFICE/OUTPATIENT VISIT, EST Akanksha DRIVER DO

[employer]                       CPT-4: 08250              2010

 

                    (85190) OFFICE/OUTPATIENT VISIT, EST Akanksha DRIVER DO

[employer]                       CPT-4: 04553              2010







Plan of Care





             Planned Activity Notes        Codes        Status       Date

 

                          Visit Diagnosis Plan: Essential (primary) hypertension

 Discussion: Improving 

with higher dose of metoprolol Recheck 2weeks

                                        ICD-9 : 401.9

ICD-10 : I10

                                                    2020

 

                          Visit Diagnosis Plan: Palpitations Discussion: Continu

e higher dose of 

metoprolol

                                        ICD-9 : 785.1

ICD-10 : R00.2

                                                    2020

 

                                        Appointment: Akanksha Driver

WPtel:[phone]

                                        [address] [place], [place] [address]                                              FOLLOW UP       2020

 

                                        Appointment: Akanksha Driver

WPtel:[phone]

                                        [address] [place], [place] [id number]

              2020--moved up to Tues 20 at 4pm (km)                 [place]

[facility]        [facility]

 

                          Visit Diagnosis Plan: Palpitations Discussion: Check C

BC, CMP, TSH

                                        ICD-9 : 785.1

ICD-10 : R00.2

                                                    2020

 

                          Visit Diagnosis Plan: Essential hypertension Discussio

n: Increase metoprolol to 

25mg q AM and 50mg po q pM Recheck 1 week

                                        ICD-9 : 401.9

ICD-10 : I10

                                                    2020

 

                                        Appointment: Akanksha Driver

WPtel:[phone]

                                        [address] [place], [place] [id number]

                                              BP CHECK        2020

 

                                        Appointment: Akanksha Driver

WPtel:[phone]

                                        [address] [place], [place] [id number]

                                              ACUTE ILLNESS   2020

 

                          Patient Education: metoprolol tartrate- [employer] [facility] [id number] 

[email or web address]

                                        Completed           2020

 

                    Care Plan: X-RAY EXAM NECK SPINE 4/5VWS                     

LOINC : 36311-0

                          Pending                   2020

 

                    Care Plan: X-RAY EXAM THORAC SPINE4/>VW                     

LOINC : 27552-9

                          Pending                   2020

 

                                        Appointment: Akanksha Driver

WPtel:[phone]

                                        [address] [place] [address]                                              LAB             2019

 

                                        Appointment: Akanksha Driver

WPtel:[phone]

                                        [address] [place] [address]

[place]                                              INJECTION       10/22/2019

 

             Patient Education: INFLUENZA VACCINE [facility]                           

Completed    10/22/2019

 

                                        Appointment: Akanksha Driver

WPtel:[phone]

                                        [address] [place] [address]                                              LAB             10/11/2019

 

                          Visit Diagnosis Plan: Acute serous otitis media, left 

ear Discussion: instructed

to use flonase and claritin daily for symptom relief. discussed with patient 
that if no improvement in 2 weeks, will need to follow up with ENT for audiology
exam. instructed to call office with any other symptoms such as otalgia, 
dysphagia, fever, etc.

                                        ICD-9 : 381.01

ICD-10 : H65.02

                                                    2019

 

                                        Appointment: Nat Lozoya

                                        [address] [place] [address]                                              ACUTE ILLNESS   2019

 

                          Visit Diagnosis Plan: Urinary tract infection, site no

t specified Discussion: 

Started an old abx prescription

                                        ICD-9 : 599.0

ICD-10 : N39.0

                                                    06/10/2019

 

                          Visit Diagnosis Plan: Hypoglycemia, unspecified Discus

madeleine: Monitor BS At least 

6 small meals a day each with protein

                                        ICD-9 : 251.2

ICD-10 : E16.2

                                                    06/10/2019

 

                          Visit Diagnosis Plan: Essential (primary) hypertension

 Discussion: Stable Check 

CMP

                                        ICD-9 : 401.9

ICD-10 : I10

                                                    06/10/2019

 

                          Visit Diagnosis Plan: Other fatigue Discussion: Check 

CBC, TSH

                                        ICD-9 : 780.79

ICD-10 : R53.83

                                                    06/10/2019

 

                                        Appointment: Akanksha Driver

WPtel:[phone]

                                        [address] [place], [place] [id number]

[place]                                              FOLLOW UP       06/10/2019

 

                          Visit Diagnosis Plan: Essential (primary) hypertension

 Discussion: Stable Sees 

Dr. Clements this month

                                        ICD-9 : 401.9

ICD-10 : I10

                                                    2019

 

                          Visit Diagnosis Plan: Urinary tract infection, site no

t specified Discussion: 

Macrobid 100mg po BID for 1 week

                                        ICD-9 : 599.0

ICD-10 : N39.0

                                                    2019

 

                          Visit Diagnosis Plan: Gastro-esophageal reflux disease

 without esophagitis 

Discussion: Stable on omeprazole

Follow Up: 3 months

                                        ICD-9 : 530.81

ICD-10 : K21.9

                                                    2019

 

                                        Appointment: Akanksha Driver

WPtel:[phone]

                                        [address] [place], [place] [id number]

[place]                                              FOLLOW UP       2019

 

                          Patient Education: metoprolol tartrate- [employer] [facility] [id number] 

[email or web address]/resources/getResource/61/[email or web address]-[email or web address]

                                        Completed           2019

 

                                        Appointment: Akanksha Driver

WPtel:[phone]

                                        [address] [place], [place] [id number]

[place]                                              CANCELED        02/15/2019

 

                          Visit Diagnosis Plan: Gastro-esophageal reflux disease

 without esophagitis 

Discussion: Continue protonix and carafate Proceed with updated EGD

                                        ICD-9 : 530.81

ICD-10 : K21.9

                                                    2018

 

                          Visit Diagnosis Plan: Epigastric pain Discussion: [facility]

k CT abdomen/pelvis due to

ongoing abdominal pain

                                        ICD-9 : 789.06

ICD-10 : R10.13

                                                    2018

 

                                        Appointment: Akanksha Driver

WPtel:[phone]

                                        [address] [place], [place] [address]

[place]                                              FOLLOW UP       2018

 

                    Care Plan: CT PELVIS W/O DYE                     LOINC : 361

08-9

                          Pending                   2018

 

                    Care Plan: CT ABDOMEN W/O DYE                     LOINC : 36

103-0

                          Pending                   2018

 

                    Care Plan: Referral Order                     SNOMED-CT : 30

[id number]

                          Pending                   2018

 

                                        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

art disease of native coronary artery 

without angina pectoris                 Discussion: S/P cardiac cath--doing medi

vicki management 

with imdur and metoprolol increased Has fwup with cardiology next week Discussed
cardiac rehab

                                        ICD-9 : 414.00

ICD-10 : I25.10

                                                    2018

 

                          Visit Diagnosis Plan: Generalized anxiety disorder Dis

cussion: Stable

                                        ICD-9 : 300.00

ICD-10 : F41.1

                                                    2018

 

                          Visit Diagnosis Plan: Gastro-esophageal reflux disease

 without esophagitis 

Discussion: Continue protonix at BID dosing and carafate BID

Follow Up: 2 months

                                        ICD-9 : 530.81

ICD-10 : K21.9

                                                    2018

 

                          Visit Diagnosis Plan: Essential (primary) hypertension

 Discussion: Stable

                                        ICD-9 : 401.9

ICD-10 : I10

                                                    2018

 

                                        Appointment: Akanksha Driver

WPtel:[phone] [facility]

[place]                                              FOLLOW UP       2018

 

                          Visit Diagnosis Plan: Gastro-esophageal reflux disease

 without esophagitis 

Discussion: Continue protonix at BID dosing Continue carafate at q AC and HS 
dosing for 2 more weeks then decrease to BID dosing

Follow Up: 4 weeks

                                        ICD-9 : 530.81

ICD-10 : K21.9

                                                    10/02/2018

 

                          Visit Diagnosis Plan: Urinary tract infection, site no

t specified Discussion: 

Reculture urine

                                        ICD-9 : 599.0

ICD-10 : N39.0

                                                    10/02/2018

 

                          Visit Diagnosis Plan: Generalized anxiety disorder Dis

cussion: Increase xanax to

BID dosing especially with upcoming cardiac testing which is already making 
patient more anxious and worried

                                        ICD-9 : 300.00

ICD-10 : F41.1

                                                    10/02/2018

 

                          Visit Diagnosis Plan: Palpitations Discussion: Cardilo

logy going to schedule 

Lexiscan

                                        ICD-9 : 785.1

ICD-10 : R00.2

                                                    10/02/2018

 

                                        Appointment: Akanksha Driver

WPtel:[phone] [facility]

[place]                                              FOLLOW UP       10/02/2018

 

             Patient Education: Patient Medication Summary                      

     Completed    10/02/2018

 

                                        Appointment: Akanksha Driver

WPtel:[phone]

                                        [facility]

                                              LAB             2018

 

             Patient Education: Patient Medication Summary                      

     Completed    2018

 

                          Visit Diagnosis Plan: Epigastric pain Discussion: Cont

inue protonix and carafate

but make into a slurry Flu shot given Discussed EGD if symptoms persist

Follow Up: 2 weeks

                                        ICD-9 : 789.06

ICD-10 : R10.13

                                                    2018

 

                          Visit Diagnosis Plan: Generalized anxiety disorder Dis

cussion: Did discuss 

increased risk of benzodiazepines causing dementia but at this time will stay at
xanax 0.25mg po BID

                                        ICD-9 : 300.00

ICD-10 : F41.1

                                                    2018

 

                                        Appointment: Akanksha Driver

WPtel:[phone]

                                        [facility] [address]                                              FOLLOW UP       2018

 

             Patient Education: Patient Medication Summary                      

     Completed    2018

 

                          Visit Diagnosis Plan: Essential (primary) hypertension

 Discussion: Has 

hydralazine to use prn per cardiology Going to do 30 day holter monitor

                                        ICD-9 : 401.9

ICD-10 : I10

                                                    2018

 

                          Visit Diagnosis Plan: Hypoglycemia, unspecified Discus

madeleine: 6 small meals a 

day--each with protein Increase fluid intake

                                        ICD-9 : 251.2

ICD-10 : E16.2

                                                    2018

 

                          Visit Diagnosis Plan: Gastro-esophageal reflux disease

 without esophagitis 

Discussion: Change to protonix 40mg po BID

Follow Up: 1 months

                                        ICD-9 : 530.81

ICD-10 : K21.9

                                                    2018

 

                                        Appointment: Akanksha Driver

WPtel:[phone] [facility]

                                              ACUTE ILLNESS   2018

 

             Patient Education: Patient Medication Summary                      

     Completed    2018

 

                          Visit Diagnosis Plan: Dizziness and giddiness Discussi

on: blood work to be 

completed in office including cmp, cbc, troponin to rule out glucose 
intolerance, infection, dehydration. instructed patient that she needs to see 
her cardiologist sooner than oct and patient requested we contact dr. clements's 
office. will have front office make appt. patient has carotid doppler annually.

                                        ICD-9 : 780.4

ICD-10 : R42

                                                    2018

 

                                        Appointment: Nat Lozoya

                                        [address] [place] [address]                                              ACUTE ILLNESS   2018

 

             Patient Education: Patient Medication Summary                      

     Completed    2018

 

                          Visit Diagnosis Plan: Cervicalgia Discussion: Complete

 course of PT since 

symptoms improved Continue stretching exercises Notify if symptoms return or 
worsen

                                        ICD-9 : 723.1

ICD-10 : M54.2

                                                    2018

 

                                        Appointment: Akanksha Driver

WPtel:[phone]

                                        [address] [place] [address]                                              FOLLOW UP       2018

 

             Patient Education: Patient Medication Summary                      

     Completed    2018

 

                          Visit Diagnosis Plan: Hypoglycemia, unspecified Discus

madeleine: Eat something with 

protein every 2 hrs

                                        ICD-9 : 251.2

ICD-10 : E16.2

                                                    2018

 

                          Visit Diagnosis Plan: Other spondylosis with radiculop

athy, cervical region 

Discussion: Check MRI of cervical spine

                                        ICD-9 : 721.0

ICD-10 : M47.22

                                                    2018

 

                          Visit Diagnosis Plan: Vertigo of central origin, bilat

eral Discussion: Discussed

PT for vestibular therapy

                                        ICD-9 : 386.2

ICD-10 : H81.43

                                                    2018

 

                                        Appointment: Akanksha Driver

WPtel:[phone]

                                        [address] [place], [place] [id number]                                                              2018

 

             Patient Education: Patient Medication Summary                      

     Completed    2018

 

                    Care Plan: MRI NECK SPINE W/O DYE                     LOINC 

: 49314-5

                          Pending                   2018

 

                          Visit Diagnosis Plan: Otalgia, bilateral Discussion: k

enalog 40 mg given to 

patient im. instructed patient to monitor blood sugar at home due to risk of 
increased sugar. instructed patient to rtc on wednesday if no improvement and 
may require antibiotic.

                                        ICD-9 : 388.70

ICD-10 : H92.03

                                                    2018

 

                          Visit Diagnosis Plan: Benign paroxysmal vertigo, bilat

eral Discussion: patient 

has meclizine at home but states it makes her too tired. instructed patient to 
cut in half and take at night. if it doesn't cause too much drowsiness, 
instructed to take bid until feeling better. instructed to rtc wed if no 
improvement or worsening symptoms. instructed patient to increase fluid intake 
as well.

                                        ICD-9 : 386.11

ICD-10 : H81.13

                                                    2018

 

                                        Appointment: Nat Lozoya

                                        [address] [place] [address]                                              ACUTE ILLNESS   2018

 

             Patient Education: Patient Medication Summary                      

     Completed    2018

 

                          Visit Diagnosis Plan: Left lower quadrant pain Discuss

ion: Culture urine Notify 

if worsens

                                        ICD-9 : 789.04

ICD-10 : R10.32

                                                    2018

 

                          Visit Diagnosis Plan: Low back pain Discussion: Stretc

hes Topical aspercreme 

Tylenol 1 po TID

                                        ICD-9 : 724.2

ICD-10 : M54.5

                                                    2018

 

                          Visit Diagnosis Plan: Radiculopathy, lumbosacral regio

n Discussion: As above

                                        ICD-9 : 724.4

ICD-10 : M54.17

                                                    2018

 

                                        Appointment: Akanksha Driver

WPtel:[phone]

                                        [address] [place] [address]                                              ACUTE ILLNESS   2018

 

             Patient Education: Patient Medication Summary                      

     Completed    2018

 

                                        Appointment: Akanksha Driver

WPtel:[phone]

                                        [address] [place] [address]

[place]                                              INJECTION       10/06/2017

 

             Patient Education: Patient Medication Summary                      

     Completed    10/06/2017

 

                                        Appointment: Akanksha Driver

WPtel:[phone]

                                        [address] [place] [address]

US                                              LAB             2017

 

             Patient Education: Patient Medication Summary                      

     Completed    2017

 

                          Visit Diagnosis Plan: Pain in thoracic spine Discussio

n: PT has helped Continue 

stretches and walking [facility]ed [facility]

                                        ICD-9 : 724.1

ICD-10 : M54.6

                                                    2017

 

                          Visit Diagnosis Plan: Other intervertebral disc degene

ration, lumbar region 

Follow Up: 3 months

                                        ICD-9 : 722.52

ICD-10 : M51.36

                                                    2017

 

                                        Appointment: Akanksha Driver

WPtel:[phone]

                                        [address] [place] [address]

                                              FOLLOW UP       2017

 

             Patient Education: Patient Medication Summary                      

     Completed    2017

 

                          Visit Diagnosis Plan: Low back pain Discussion: Start 

PT for low back- Seems 

like abdominal pain is radiating from low back

Follow Up: 5 weeks

                                        ICD-9 : 724.2

ICD-10 : M54.5

                                                    2017

 

                          Visit Diagnosis Plan: Left lower quadrant pain Discuss

ion: Had CT scan of 

abdomen/pelvis in 2017 and normal colonoscopy in 2015

                                        ICD-9 : 789.04

ICD-10 : R10.32

                                                    2017

 

                                        Appointment: Akanksha Driver

WPtel:[phone]

                                        [address] [place], [place] [id number]                                              ACUTE ILLNESS   2017

 

             Patient Education: Patient Medication Summary                      

     Completed    2017

 

                                        Appointment: Akanksha Driver

WPtel:[phone]

                                        [address] [place], [place] [address]

[place]                                              LAB             2017

 

             Patient Education: Patient Medication Summary                      

     Completed    2017

 

                          Visit Diagnosis Plan: Mixed hyperlipidemia Discussion:

 Check lipids

                                        ICD-9 : 272.4

ICD-10 : E78.2

                                                    2017

 

                          Visit Diagnosis Plan: Impaired fasting glucose Discuss

ion: Return in AM for CMP,

HbA1C Accuchecks daily Diet/Exercise discussed at length

                                        ICD-9 : 790.29

ICD-10 : R73.01

                                                    2017

 

                          Visit Diagnosis Plan: Other fatigue Discussion: Check 

CBC, TSH

                                        ICD-9 : 780.79

ICD-10 : R53.83

                                                    2017

 

                          Visit Diagnosis Plan: Mastodynia Discussion: Check Jace

ateral diagnostic 

mammogram with US

                                        ICD-9 : 611.71

ICD-10 : N64.4

                                                    2017

 

                                        Appointment: Akanksha Driver

WPtel:[phone]

                                        [address] [place], [place] [id number]

[place]              3/7 confirmed `sl                 ACUTE ILLNESS   2017

 

             Patient Education: Patient Medication Summary                      

     Completed    2017

 

                    Care Plan: MAMMOGRAM SCREENING                     LOINC : 2

6347-5

                          Pending                   2017

 

                          Visit Diagnosis Plan: Acute upper respiratory infectio

n, unspecified Discussion:

Exam is reassuring Likely viral illness Supportive care reviewed and encouraged 
Follow up PRN

                                        ICD-9 : 465.9

ICD-10 : J06.9

                                                    2017

 

                                        Appointment: Graham Luba 

                                        [address] [place] [address]                                              ACUTE ILLNESS   2017

 

             Patient Education: Patient Medication Summary                      

     Completed    2017

 

                          Visit Plan:               Supportive care. Rest, Fluid

s, Tylenol/Motrin prn fever or 

bodyaches. Notify if worsening symptoms.

                                                            2016

 

                                        Appointment: Akanksha Driver

WPtel:[phone]

                                        [address] [place] [address]                                              ACUTE ILLNESS   2016

 

             Patient Education: Patient Medication Summary                      

     Completed    2016

 

                                        Appointment: Akanksha Driver

WPtel:[phone]

                                        [address] [place] [address]                                              INJECTION       10/07/2016

 

             Patient Education: Patient Medication Summary                      

     Completed    10/07/2016

 

                                        Appointment: Akanksha Driver

WPtel:[phone]

                                        [address] [place] [address]                                              LAB             2016

 

             Patient Education: Patient Medication Summary                      

     Completed    2016

 

                          Visit Plan:               Add miralax daily Use daily 

probiotic and metamucil and push fluids

Notify if worsens/persists

                                                            2016

 

                                        Appointment: Akanksha Driver

WPtel:[phone]

                                        [address] [place] [address]              16 confirmed ~sl                 ACUTE ILLNESS   2016

 

             Patient Education: Patient Medication Summary                      

     Completed    2016

 

                          Visit Plan:               No NSAIDs Kidney function di

scussed Discussed hydration Monitor lab

every 4months so will check CMP, HbA1C next month

                                                            2016

 

                                        Appointment: Akanksha Driver

WPtel:[phone]

                                        [address] [place] [address]              03/15 confirmed ~sl                 ACUTE ILLNESS   2016

 

             Patient Education: Patient Medication Summary                      

     Completed    2016

 

                          Visit Plan:               Vestibular exercises Refill 

flonase Strict low Na diet--discussed 

that needs to read labels Rx for walker with chair given due to muscle 
weakness/unsteadiness Has carotids and abdominal aorta and legs checked in 
January Check Chem 7

                                                            2015

 

                                        Appointment: Akanksha Driver

WPtel:[phone]

                                        [address] [place] [address]              12/15/15 appt confirmed cn                 FOLLOW UP       

 

             Patient Education: Patient Medication Summary                      

     Completed    2015

 

             Patient Education: Vertigo                           Completed    1

2015

 

                                        Appointment: Akanksha Driver

WPtel:[phone]

                                        [address] [place] [address]                                              INJECTION       10/16/2015

 

             Patient Education: Patient Medication Summary                      

     Completed    10/16/2015

 

                                        Appointment: Akanksha Driver

WPtel:[phone]

                                        [address] [place] [address]                                              UA              09/15/2015

 

             Patient Education: Patient Medication Summary                      

     Completed    09/15/2015

 

                                        Referral: Landon De La Torre

WPtel:[phone]

[address]              Referral                        Completed       2015

 

                                        Appointment: Akanksha Driver

WPtel:[phone]

                                        [address] [place] [address]                                              LAB             09/10/2015

 

             Patient Education: Patient Medication Summary                      

     Completed    09/10/2015

 

                          Visit Plan:               Check CMP, CBC, TSH, Free T4

, B12, Lipids, HbA1C Discussed 6 small 

meals a day each with protein Would likely benefit from antidepressant Update 
colonoscopy

                                                            2015

 

                                        Appointment: Akanksha Driver

WPtel:[phone]

                                        [address] [place], [place] [address]              9/8/15 confirmed                 ACUTE ILLNESS   2015

 

             Patient Education: Patient Medication Summary                      

     Completed    2015

 

                          Visit Plan:               Cerumen flush with warm wate

r and peroxide - good results Resume 

Nasonex nasal spray daily Recommended Debrox earwax removal drops

                                                            2015

 

                                        Appointment: Bertha Mon

WPtel:[phone]

                                        [address] [place] [address]                                              ACUTE ILLNESS   2015

 

             Patient Education: Patient Medication Summary                      

     Completed    2015

 

                          Visit Plan:               Continue aspirin daily Add m

eloxicam for 1week Elevate and Ice Call

on 2015

 

                                        Appointment: Akanksha Driver

WPtel:[phone]

                                        [address] [place], [place] [id number]                                              ACUTE ILLNESS   2015

 

             Patient Education: Patient Medication Summary                      

     Completed    2015

 

                          Visit Plan:               Been using baclofen at [facility] and has helped some PT for next 

2-4weeks

                                                            2015

 

                                        Appointment: Akanksha Driver

WPtel:[phone]

                                        [address] [place], [place] [id number] [place] [address] Follow Up 2015

 

             Patient Education: Patient Medication Summary                      

     Completed    2015

 

                                        Appointment: Akanksha Driver

WPtel:[phone]

                                        [address] [place], [place] [id number]

US                                              LAB             2015

 

                                        Appointment: Akanksha Driver

WPtel:[phone]

                                        [address] [place] [address]              feeling better, weather -                 FOLLOW UP       

 

                                        Referral: Landon De La Torre

WPtel:[phone]

[facility] [address]              Referral                        Appointment Requested 2015

 

                          Visit Plan:               Continue exercise and curren

t meds See surgery for removal of right

arm lesion

                                                            2015

 

                                        Appointment: Akanksha Driver

WPtel:[phone]

                                        [address] [place], [place] [id number]

                                              FOLLOW UP       2015

 

             Patient Education: Patient Medication Summary                      

     Completed    2015

 

                                        Appointment: Akanksha Driver

WPtel:[phone]

                                        [address] [place], [place] [id number]

US                                              INJECTION       10/17/2014

 

             Patient Education: Patient Medication Summary                      

     Completed    10/17/2014

 

                                        Appointment: Bertha Mon

WPtel:[phone]

                                        [address] [place], [place] [id number]                                              ACUTE ILLNESS   2014

 

             Patient Education: Patient Medication Summary                      

     Completed    2014

 

                          Visit Plan:               Discussed that likely lumbar

 etiology for leg weakness Will change 

amlodopine to low dose dyazide and see if helps legs and inner ear

                                                            2014

 

                                        Appointment: Akanksha Driver

WPtel:[phone]

                                        [address] [place] [address]                                              FOLLOW UP       2014

 

             Patient Education: Patient Medication Summary                      

     Completed    2014

 

                          Visit Plan:               Ceftin and continue claritin

/flonase Decrease amlodopine to 2.5mg q

HS until fwup with Card due to weakness

                                                            2014

 

                                        Appointment: Akanksha Driver

WPtel:[phone]

                                        [address] [place] [address]                                              ACUTE ILLNESS   2014

 

             Patient Education: Patient Medication Summary                      

     Completed    2014

 

                                        Appointment: Akanksha Driver

WPtel:[phone]

                                        [address] [place] [address]                                              LAB             2014

 

             Patient Education: Patient Medication Summary                      

     Completed    2014

 

                                        Appointment: Bertha Mon

WPtel:[phone]

                                        [address] [place] [address]                                              ACUTE ILLNESS   2014

 

             Patient Education: Patient Medication Summary                      

     Completed    2014

 

                          Visit Plan:               Supportive care. Rest, Fluid

s, Tylenol prn fever or bodyaches. 

Notify if worsening symptoms. Ceftin

                                                            10/15/2013

 

                                        Appointment: Bertha Mon

WPtel:[phone]

                                        [address] [place] [address]                                              ACUTE ILLNESS   10/15/2013

 

             Patient Education: Patient Medication Summary                      

     Completed    10/15/2013

 

                                        Appointment: Akanksha Driver

WPtel:[phone]

                                        [address] [place] [address]                                              BP CHECK        2013

 

             Patient Education: Patient Medication Summary                      

     Completed    2013

 

                                        Appointment: Akanksha Driver

WPtel:[phone]

                                        [address] [place], [place] [address]                                              ER Follow UP    2013

 

             Patient Education: Patient Medication Summary                      

     Completed    2013

 

                          Visit Plan:               Restart flonase BID Use mecl

izine 25mg q HS Vestibular exercises 

Claritin 10mg q AM See ENT if doesn't resolve

                                                            2013

 

                                        Appointment: Akanksha Driver

WPtel:[phone]

                                        [address] [place], [place] [id number]

                                              ACUTE ILLNESS   2013

 

             Patient Education: Patient Medication Summary                      

     Completed    2013

 

                          Visit Plan:               Will continue to observe

                                                            2013

 

                                        Appointment: Akanksha Driver

WPtel:[phone]

                                        [address] [place], [place] [id number]

                                              FOLLOW UP       2013

 

             Patient Education: Patient Medication Summary                      

     Completed    2013

 

                          Visit Plan:               ERx for Augmentin 875mg q12 

x 10 days and Floxin otic drops 5 gtts 

AD x7days Sample of Nasonex per pt request Discussed treatment (nasal saline, 
salt water gargles, keeping right ear clean and dry, for worsening go to UC on 
weekend, Tylenol/ibuprofen, etc.) RTC 2 weeks for ear recheck.

                                                            05/10/2013

 

                                        Appointment: Jill Jean 

WPtel:[phone]

                                        [address] [place] [address]                                              ACUTE ILLNESS   05/10/2013

 

             Patient Education: Patient Medication Summary                      

     Completed    05/10/2013

 

                          Visit Plan:               Decrease caffeine intake Marina

ck Bilateral Mammogram with US of left 

breast

                                                            2013

 

                                        Appointment: Akanksha Driver

WPtel:[phone]

                                        [address] [place], [place] [id number]

                                              ACUTE ILLNESS   2013

 

             Patient Education: Patient Medication Summary                      

     Completed    2013

 

                                        Appointment: Kate Hall

WPtel:[phone]

                                        [address] [place], [place] [id number]              appt scheduled                  ACUTE ILLNESS   2013

 

                                        Appointment: Akanksha Driver

WPtel:[phone]

                                        [address] [place], [place] [id number]

US                                              LAB             2012

 

             Patient Education: Patient Medication Summary                      

     Completed    2012

 

                          Visit Plan:               Continue off carafate and se

e how does Continue probiotic Add 

Vestibular exercises and use meclizine prn Continue Nasonex Check fasting lab 
including CMP, Lipids, CBC, TSH, Free T4, HbA1C

                                                            2012

 

                                        Appointment: Akanksha Drivertel:[phone]

                                        [address] [place] [address]                                              FOLLOW UP       2012

 

             Patient Education: Patient Medication Summary                      

     Completed    2012

 

                          Visit Plan:               Continue carafate for 4more 

weeks at current dose then decrease to 

BID for 2wks then q HS

                                                            10/23/2012

 

                                        Appointment: Akanksha Driver

WPtel:[phone]

                                        [address] [place] [address]                                              FOLLOW UP       10/23/2012

 

             Patient Education: Patient Medication Summary                      

     Completed    10/23/2012

 

                          Visit Plan:               Continue omeprazole Add Ellyn

fate 1gm po q AC Add daily probiotic

                                                            10/10/2012

 

                                        Appointment: Akanksha Driver

WPtel:[phone]

                                        [address] [place] [address]                                              ACUTE ILLNESS   10/10/2012

 

             Patient Education: Patient Medication Summary                      

     Completed    10/10/2012

 

                                        Appointment: Akanksha Drivertel:[phone]

                                        [address] [place] [address]                                              INJECTION       2012

 

             Patient Education: Patient Medication Summary                      

     Completed    2012

 

                          Visit Plan:               Diabetic Diet Accuchecks BID

 alternating times Hold onglyza and 

Januvia for now and continue diet and exercise and weight loss and moniter BS 
Discussed hypoglycemia and snack of peanut butter and crackers with juice or 
milk

                                                            2012

 

                                        Appointment: Akanksha Drivertel:[phone]

                                        [address] [place], [place] [id number]                                              WORK IN         2012

 

             Patient Education: Patient Medication Summary                      

     Completed    2012

 

                                        Appointment: Akanksha Driver

WPtel:[phone]

                                        [address] [place], [place] [id number]

[facility]              2012

 

             Patient Education: Patient Medication Summary                      

     Completed    2012

 

                                        Appointment: Akanksha Driver

WPtel:[phone]

                                        [address] [place], [place] [id number]

US                                              LAB             2012

 

             Patient Education: Patient Medication Summary                      

     Completed    2012

 

                                        Appointment: Akanksha Driver

WPtel:[phone]

                                        [address] [place], [place] [address]                                              ACUTE ILLNESS   2012

 

             Patient Education: Patient Medication Summary                      

     Completed    2012

 

                          Visit Plan:               Injection to Right SI joint 

as above Pt will call in 3 days on pain

Has PT starting in 2wks.

                                                            2012

 

                                        Appointment: Akanksha Driver

WPtel:[phone]

                                        [address] [place] [address]                                              ACUTE ILLNESS   2012

 

             Patient Education: Patient Medication Summary                      

     Completed    2012

 

                          Visit Plan:               OMT done Start PT May need u

pdated MRI if need to consider epidural

Prednisone

                                                            2012

 

                                        Appointment: Akanksha Driver

WPtel:[phone]

                                        [address] [place] [address]                                              OMT             2012

 

             Patient Education: Patient Medication Summary                      

     Completed    2012

 

                          Visit Plan:               Flexeril and Vimovo OMT done

 Daily stretches

                                                            2012

 

                                        Appointment: Akanksha Driver

WPtel:[phone]

                                        [address] [place] [address]                                              ACUTE ILLNESS   2012

 

             Patient Education: Patient Medication Summary                      

     Completed    2012

 

                          Visit Plan:               OMT done Daily stretches Vinod

st heat or biofreeze Right SI joint 

injection Call in 1week Vimovo BID

                                                            2012

 

                                        Appointment: Akanksha Driver

WPtel:[phone]

                                        [address] [place] [address]                                              ACUTE ILLNESS   2012

 

             Patient Education: Patient Medication Summary                      

     Completed    2012

 

                                        Appointment: Akanksha Driver

WPtel:[phone]

                                        [address] [place], [place] [id number]

US                                              LAB             2012

 

             Patient Education: Patient Medication Summary                      

     Completed    2012

 

                          Visit Plan:               Decrease amlodopine to 1.25m

g daily Schedule with Cardiology 

Fasting lab in AM

                                                            2012

 

                                        Appointment: Akanksha Drivertel:[phone]

                                        [address] [place] [address]                                              ACUTE ILLNESS   2012

 

             Patient Education: Patient Medication Summary                      

     Completed    2012

 

                          Visit Plan:               Saline nasal flushes prn. Ty

lenol/Motrin prn headache. Notify if 

persists/symptoms worsening. Cerumen removal from ears as above

                                                            2011

 

                                        Appointment: Akanksha Driver

WPtel:[phone]

                                        [address] [place] [address]                                              ACUTE ILLNESS   2011

 

             Patient Education: Patient Medication Summary                      

     Completed    2011

 

                                        Appointment: Akanksha Drivertel:[phone]

                                        [address] [place] [address]

US                                              INJECTION       10/05/2011

 

             Patient Education: Patient Medication Summary                      

     Completed    10/05/2011

 

                          Visit Plan:               Continue vimovo for 1more we

ek Increase Omeprazole to BID for 1mo 

then resume QD if stomach improved Vestibular exercises with meclizine q HS for 
next week

                                                            2011

 

                                        Appointment: Akanksha Drivertel:[phone]

                                        [address] [place] [address]                                              FOLLOW UP       2011

 

             Patient Education: Patient Medication Summary                      

     Completed    2011

 

                                        Appointment: Akanksha Drivertel:[phone]

                                        [address] [place] [address]                                              ACUTE ILLNESS   2011

 

             Patient Education: Patient Medication Summary                      

     Completed    2011

 

                                        Appointment: Akanksha Drivertel:[phone]

                                        [address] [place] [address]                                              LAB             2011

 

             Patient Education: Patient Medication Summary                      

     Completed    2011

 

                          Visit Plan:               Saline nasal flushes prn. Ty

lenol/Motrin prn headache. Notify if 

persists/symptoms worsening. Add Veramyst Increase Omeprazole to 20mg po BID

                                                            2011

 

                                        Appointment: Akanksha Driverl:[phone]

                                        [address] [place], [place] [address]                                              ACUTE ILLNESS   2011

 

             Patient Education: Patient Medication Summary                      

     Completed    2011

 

                                        Appointment: Akanksha Driver

WPtel:[phone]

                                        [address] [place], [place] [id number]

                                              FOLLOW UP       2011

 

             Patient Education: Patient Medication Summary                      

     Completed    2011

 

                                        Appointment: Akanksha Driver

WPtel:[phone]

                                        [address] [place], [place] [address]                                              ACUTE ILLNESS   2011

 

             Patient Education: Patient Medication Summary                      

     Completed    2011

 

                          Visit Plan:               Nasocourt sample given. Pt. 

will notify if symptoms are worse on 

Monday.

                                                            2010

 

                                        Appointment: Kate Hall

WPtel:[phone]

                                        [address] [place] [address]                                              ACUTE ILLNESS   2010

 

             Patient Education: Patient Medication Summary                      

     Completed    2010

 

                                        Appointment: Akanksha Driver

WPtel:[phone]

                                        [address] [place], [place] [id number]

[place]                                              INJECTION       10/06/2010

 

             Patient Education: Patient Medication Summary                      

     Completed    10/06/2010

 

                                        Appointment: Akanksha Driver

WPtel:[phone]

                                        [address] [place] [address]                                              FOLLOW UP       2010

 

             Patient Education: Patient Medication Summary                      

     Completed    2010

 

             Patient Education: Lexapro                           Completed    0

2010

 

                          Visit Plan:               May proceed with hiatal mykel

ia repair per Card. so will contact Dr. Cash's office to proceed with surgery Trial of Lexapro 5mg QD plus use 
xanax prn

                                                            2010

 

                                        Appointment: Akanksha Driver

WPtel:[phone]

                                        [address] [place], [place] [address]

                                              FOLLOW UP       2010

 

             Patient Education: Patient Medication Summary                      

     Completed    2010

 

                          Visit Plan:               B12 given Cont oral B12 and 

iron Fwup 1mo for B12 Proceed with 

hiatal hernia repair once card clearance

                                                            2010

 

                                        Appointment: Akanksha Driver

WPtel:[phone]

                                        [address] [place] [address]                                              FOLLOW UP       2010

 

             Patient Education: Patient Medication Summary                      

     Completed    2010

 

                                        Appointment: Akanksha Driver

WPtel:[phone]

                                        [address] [place], [place] [id number]

                                              FOLLOW UP       2010

 

             Patient Education: Patient Medication Summary                      

     Completed    2010

 

                                        Appointment: Akanksha Driver

WPtel:[phone]

                                        [address] [place], [place] [id number]

[place]                                              LAB             2010

 

             Patient Education: Patient Medication Summary                      

     Completed    2010

 

                          Visit Plan:               Check fasting lab in AM--CMP

,Lipids, CBC, Vit D, TSH,FreeT4, B12

                                                            2010

 

                                        Appointment: Akanksha Driver

WPtel:[phone]

                                        [address] [place] [address]                                              FOLLOW UP       2010

 

             Patient Education: Patient Medication Summary                      

     Completed    2010

 

                                        Referral: Landon De La Torre

WPtel:[phone]

#1 [address]              Referral                        Appointment Requested  

 

                                        Referral: Landon De La Torre

WPtel:[phone]

#1 [address]              Referral                        Initiated        







Instructions





                                        Comment

 

                                        . Supportive care.  Rest, Fluids, Tyleno

l/Motrin prn fever or bodyaches.  Notify

if worsening symptoms.



 

                                        . Add miralax daily

Use daily probiotic and metamucil and push fluids

Notify if worsens/persists

 

                                        . No NSAIDs

Kidney function discussed

Discussed hydration 

Monitor lab every 4months so will check CMP, HbA1C next month

 

                                        . Vestibular exercises

Refill flonase

Strict low Na diet--discussed that needs to read labels

Rx for walker with chair given due to muscle weakness/unsteadiness

Has carotids and abdominal aorta and legs checked in January

Check Chem 7



 

                                        . Check CMP, CBC, TSH, Free T4, B12, Lip

ids, HbA1C

Discussed 6 small meals a day each with protein

Would likely benefit from antidepressant 

Update colonoscopy

 

                                        . Cerumen flush with warm water and tyler

xide - good results 

Resume Nasonex nasal spray daily

Recommended Debrox earwax removal drops 

 

                                        . Continue aspirin daily

Add meloxicam for 1week

Elevate and Ice

Call on Monday

 

                                        . Been using baclofen at bedtime and has

 helped some

PT for next 2-4weeks



 

                                        . Continue exercise and current meds

See surgery for removal of right arm lesion

 

                                        . Discussed that likely lumbar etiology 

for leg weakness

Will change amlodopine to low dose dyazide and see if helps legs and inner ear

 

                                        . Ceftin and continue claritin/flonase

Decrease amlodopine to 2.5mg q HS until fwup with Card due to weakness

 

                                        .  Supportive care.  Rest, Fluids, Tylen

ol prn fever or bodyaches.  Notify if 

worsening symptoms.

Ceftin

 

                                        . Restart flonase BID

Use meclizine 25mg q HS

Vestibular exercises

Claritin 10mg q AM

See ENT if doesn't resolve

 

                                        . Will continue to observe



 

                                        . ERx for Augmentin 875mg q12 x 10 days 

and Floxin otic drops 5 gtts AD x7days

Sample of Nasonex per pt request

Discussed treatment (nasal saline, salt water gargles, keeping right ear clean 
and dry, for worsening go to UC on weekend, Tylenol/ibuprofen, etc.)

RTC 2 weeks for ear recheck.

 

                                        . Decrease caffeine intake

Check Bilateral Mammogram with US of left breast

 

                                        Left ear flushed with warm water and per

oxide with ear syringe and then last 

removed with currette--peroxide drops instilled.  Tolerated well, no 
complications, TM intact.. Continue off carafate and see how does

Continue probiotic

Add Vestibular exercises and use meclizine prn

Continue Nasonex

Check fasting lab including CMP, Lipids, CBC, TSH, Free T4, HbA1C

 

                                        . Continue carafate for 4more weeks at c

urrent dose then decrease to BID for 

2wks then q HS

 

                                        . Continue omeprazole

Add Carafate 1gm po q AC

Add daily probiotic



 

                                        . Diabetic Diet

Accuchecks BID alternating times

Hold onglyza and Januvia for now and continue diet and exercise and weight loss 
and moniter BS

Discussed hypoglycemia and snack of peanut butter and crackers with juice or 
milk

 

                                        Informed Consent obtainded.  Right SI yasir

int cleansed with alcohol and betadine 

and injected with 3cc 1%l lidocaine with 40mg depomedrol and 40mg kenalog, 
tolerated well with no complications, neosporin and bandage applied. Injection 
to Right SI joint as above

Pt will call in 3 days on pain

Has PT starting in 2wks.

 

                                        . OMT done

Start PT

May need updated MRI if need to consider epidural

Prednisone

 

                                        . Flexeril and Vimovo

OMT done

Daily stretches

 

                                        Right SI joint cleansed with alchohol an

d betadine and injected with 3cc 1% 

lidocaine with 40mg depomedrol and 40mg kenalog, tolerated well with no 
complications, neosporin and bandage applied. OMT done

Daily stretches

Moist heat or biofreeze

Right SI joint injection

Call in 1week

Vimovo BID

 

                                        . Decrease amlodopine to 1.25mg daily

Schedule with Cardiology

Fasting lab in AM

 

                                        .  Saline nasal flushes prn. Tylenol/Mot

rin prn headache.  Notify if 

persists/symptoms worsening.

Cerumen removal from ears as above



 

                                        . Continue vimovo for 1more week

Increase Omeprazole to BID for 1mo then resume QD if stomach improved

Vestibular exercises with meclizine q HS for next week

 

                                        . Saline nasal flushes prn. Tylenol/Motr

in prn headache.  Notify if 

persists/symptoms worsening.

Add Veramyst

Increase Omeprazole to 20mg po BID

 

                                        . Nasocourt sample given.  Pt. will noti

fy if symptoms are worse on Monday. 

 

                                        . May proceed with hiatal hernia repair 

per Card. so will contact Dr. Cash's office to proceed with surgery



Trial of Lexapro 5mg QD plus use xanax prn

 

                                        . B12 given

Cont oral B12 and iron

Fwup 1mo for B12

Proceed with hiatal hernia repair once card clearance

 

                                        . Check fasting lab in AM--CMP,Lipids, C

BC, Vit D, TSH,FreeT4, B12







Medical Equipment

No Medical Equipment data



Health Concerns Section

Health Concerns data not found



Goals Section

Goals data not found



Interventions Section

Interventions data not found



Health Status Evaluations/Outcomes Section

Health Status Evaluations/Outcomes data not found



Advance Directives

No Advance Directive data

## 2020-02-19 NOTE — XMS REPORT
Continuity of Care Document

                             Created on: 2020



Leilani Ramírez

External Reference #: [id number]

: 1940

Sex: Female



Demographics





                          Address                   [address] [place]  [address]

 

                          Home Phone                [phone] x

 

                          Preferred Language        Unknown

 

                          Marital Status            Unknown

 

                          Pentecostal Affiliation     Unknown

 

                          Race                      Unknown

 

                          Ethnic Group              Unknown





Author





                          Organization              Unknown

 

                          Address                   Unknown

 

                          Phone                     Unavailable



              



Allergies

      



             Active           Description           Code           Type         

  Severity   

                Reaction           Onset           Reported/Identified          

 

Relationship to Patient                 Clinical Status        

 

             Yes           quinine           [id number]           Drug Allergy  

         

Unknown           N/A                        10/15/2015                         

  

     

 

             Yes           quinine           [id number]           Drug Allergy  

         Mild

             RASH                        2019                             

    



                    



Medications

      



There is no data.                  



Problems

      



             Date Dx Coded           Attending           Type           Code    

       

Diagnosis                               Diagnosed By        

 

                1308           ZAYRA STREETER           Ot      

        M19.90     

                          UNSPECIFIED OSTEOARTHRITIS, UNSPECIFIED               

      

 

                1308           ZAYRA STREETER           Ot      

        M48.00     

                          SPINAL STENOSIS, SITE UNSPECIFIED                    

 

                1530           AKANKSHA DRIVER DO S           Ot      

        M51.36     

                          OTHER INTERVERTEBRAL DISC DEGENERATION,               

      

 

             2012                        Ot           722.52           LUM

B/LUMBOSAC 

DISC DEGEN                                       

 

             2012                        Ot           V57.1           PHYS

ICAL THERAPY 

NEC                                              

 

                2013           PREETI RAMSEY, CAITIE HOFF           Ot      

        401.9      

                          HYPERTENSION NOS                    

 

                2013           PREETI RAMSEY, CAITIE HOFF           Ot      

        433.10     

                          CAROTID ARTERY OCCLUSION W O CEREBRAL IN              

      

 

                2013           PREETI RAMSEY, CAITIE HOFF           Ot      

        784.0      

                          HEADACHE                           

 

                2013           PREETI RAMSEY, CAITIE HOFF           Ot      

        V58.69     

                          OTH MED,LT,CURRENT USE                    

 

             2015                        Ot           401.9           HYPE

RTENSION NOS  

                                                 

 

             2015                        Ot           414.00           COR

ON ATHEROSCLER

NOS TYPE VESSEL, NATIV                           

 

             2015                        Ot           530.81           ESO

PHAGEAL REFLUX

                                                 

 

             2015                        Ot           786.59           DORITA

ST PAIN NEC   

                                                 

 

             2015                        Ot           V45.81           AOR

TOCORONARY 

BYPASS                                           

 

                2015           AKANKSHA DRIVER DO S           Ot      

        724.1      

                                                             

 

                2015           AKANKSHA DRIVER DO S           Ot      

        V57.1      

                                                             

 

                04/15/2015           AKANKSHA DRIVER DO S           Ot      

        724.1      

                          PAIN IN THORACIC SPINE                    

 

                04/15/2015           AKANKSHA DRIVER DO S           Ot      

        V57.1      

                          PHYSICAL THERAPY NEC                    

 

             10/15/2015           [place] JESUS CASE           Ot           K31.

7           

POLYP OF STOMACH AND DUODENUM                    

 

             10/15/2015           [facility]           Ot           K52.

9           

NONINFECTIVE GASTROENTERITIS AND COLITIS                    

 

             10/15/2015           [place] [facility]           Ot           K57.

90           

DVRTCLOS OF INTEST, PART UNSP, W/O PERF                     

 

             10/15/2015           [place] [facility]           Ot           Z12.

11           

ENCOUNTER FOR SCREENING FOR MALIGNANT NE                    

 

             10/15/2015           [place] [facility] CASE           Ot           Z80.

0           

FAMILY HISTORY OF MALIGNANT NEOPLASM OF                     

 

             2017                        Ot           611.71           MAS

TODYNIA       

                                                 

 

             2017                        Ot           Z01.818           EN

COUNTER FOR 

OTHER PREPROCEDURAL EXAMIN                       

 

             2017                        Ot           Z12.11           ENC

OUNTER FOR 

SCREENING FOR MALIGNANT NE                       

 

             2017                        Ot           Z80.0           FAMI

LY HISTORY OF 

MALIGNANT NEOPLASM OF                            

 

             2017           CLAUDETTE RIDER DO           Ot           E66.9  

         

OBESITY, UNSPECIFIED                             

 

             2017           CLAUDETTE RIDER DO           Ot           I10    

       

ESSENTIAL (PRIMARY) HYPERTENSION                    

 

             2017           CLAUDETTE RIDER DO           Ot           I25.10 

          

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                     

 

             2017           CLAUDETTE RIDER DO           Ot           I25.2  

         OLD 

MYOCARDIAL INFARCTION                            

 

             2017           CLAUDETTE RIDER DO           Ot           K44.9  

         

DIAPHRAGMATIC HERNIA WITHOUT OBSTRUCTION                    

 

             2017           CLAUDETTE RIDER DO           Ot           K57.30 

          

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                    

 

             2017           CLAUDETTE RIDER DO           Ot           M47.812

           

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                    

 

             2017           CLAUDETTE RIDER DO           Ot           S00.83X

A           

CONTUSION OF OTHER PART OF HEAD, INITIAL                    

 

             2017           CLAUDETTE RIDER DO           Ot           S20.311

A           

ABRASION OF RIGHT FRONT WALL OF THORAX,                     

 

             2017           CLAUDETTE RIDER DO           Ot           S20.312

A           

ABRASION OF LEFT FRONT WALL OF THORAX, I                    

 

             2017           CLAUDETTE RIDER DO           Ot           S29.9XX

A           

UNSPECIFIED INJURY OF THORAX, INITIAL EN                    

 

             2017           CLAUDETTE RIDER DO           Ot           W18.09X

A           

STRIKING AGAINST OTH OBJECT W SUBSEQUENT                    

 

             2017           [facility]           Y92.013

           

BEDROOM OF SINGLE-FAMILY (PRIVATE) HOUSE                    

 

             2017           [facility] CLAUDETTE CASE           Ot           Y99.8  

         

OTHER EXTERNAL CAUSE STATUS                      

 

             2017           [facility]           Z79.82 

          

LONG TERM (CURRENT) USE OF ASPIRIN                    

 

             2017           [facility]           Z79.899

           

OTHER LONG TERM (CURRENT) DRUG THERAPY                    

 

             2017           [facility]           Z95.1  

         

PRESENCE OF AORTOCORONARY BYPASS GRAFT                    

 

             2017           [place] CLAUDETTE CASE           Ot           E66.9  

         

OBESITY, UNSPECIFIED                             

 

             2017           ADRY DOCLAUDETTE           Ot           I10    

       

ESSENTIAL (PRIMARY) HYPERTENSION                    

 

             2017           [facility]           I25.10 

          

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                     

 

             2017           [facility]           I25.2  

         OLD 

MYOCARDIAL INFARCTION                            

 

             2017           [facility] DOCLAUDETTE           Ot           K44.9  

         

DIAPHRAGMATIC HERNIA WITHOUT OBSTRUCTION                    

 

             2017           [facility]           K57.30 

          

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                    

 

             2017           [facility]           M47.812

           

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                    

 

             2017           [facility]           S00.83X

A           

CONTUSION OF OTHER PART OF HEAD, INITIAL                    

 

             2017           [facility] CLAUDETTE CASE           Ot           S20.311

A           

ABRASION OF RIGHT FRONT WALL OF THORAX,                     

 

             2017           [facility] DOCLAUDETTE           Ot           S20.312

A           

ABRASION OF LEFT FRONT WALL OF THORAX, I                    

 

             2017           [facility] CLAUDETTE CASE           Ot           S29.9XX

A           

UNSPECIFIED INJURY OF THORAX, INITIAL EN                    

 

             2017           CLAUDETTE RIDER DO           Ot           W18.09X

A           

STRIKING AGAINST OTH OBJECT W SUBSEQUENT                    

 

             2017           [facility]           Y92.013

           

BEDROOM OF SINGLE-FAMILY (PRIVATE) HOUSE                    

 

             2017           [facility] CLAUDETTE CASE           Ot           Y99.8  

         

OTHER EXTERNAL CAUSE STATUS                      

 

             2017           [facility] DOCLAUDETTE           Ot           Z79.82 

          

LONG TERM (CURRENT) USE OF ASPIRIN                    

 

             2017           CLAUDETTE RIDER DO           Ot           Z79.899

           

OTHER LONG TERM (CURRENT) DRUG THERAPY                    

 

             2017           CLAUDETTE RIDER DO           Ot           Z95.1  

         

PRESENCE OF AORTOCORONARY BYPASS GRAFT                    

 

             2017                        Ot           611.71           MAS

TODYNIA       

                                                 

 

             2017                        Ot           Z01.818           EN

COUNTER FOR 

OTHER PREPROCEDURAL EXAMIN                       

 

             2017                        Ot           Z12.11           ENC

OUNTER FOR 

SCREENING FOR MALIGNANT NE                       

 

             2017                        Ot           Z80.0           FAMI

LY HISTORY OF 

MALIGNANT NEOPLASM OF                            

 

                2017           ORENDER DO, AKANKSHA S           Ot      

        N64.4      

                          MASTODYNIA                         

 

                2017           LINDAND DO, AKANKSHA S           Ot      

        N64.4      

                          MASTODYNIA                         

 

             2017                        Ot           611.71           MAS

TODYNIA       

                                                 

 

             2017                        Ot           Z01.818           EN

COUNTER FOR 

OTHER PREPROCEDURAL EXAMIN                       

 

             2017                        Ot           Z12.11           ENC

OUNTER FOR 

SCREENING FOR MALIGNANT NE                       

 

             2017                        Ot           Z80.0           FAMI

LY HISTORY OF 

MALIGNANT NEOPLASM OF                            

 

                2017           LINDANDER DO, AKANKSHA S           Ot      

        N64.4      

                          MASTODYNIA                         

 

                2017           LINDANDYESI DO, AKANKSHA S           Ot      

        N64.4      

                          MASTODYNIA                         

 

                2017           LINDAND DO, AKANKSHA S           Ot      

        N64.4      

                          MASTODYNIA                         

 

                2017           LINDANDYESI DO, AKANKSHA S           Ot      

        N64.4      

                          MASTODYNIA                         

 

                2017           LINDANDYESI DO, AKANKSHA S           Ot      

        N64.4      

                          MASTODYNIA                         

 

                2017           LINDAND DO, AKANKSHA S           Ot      

        N64.4      

                          MASTODYNIA                         

 

                2017           LINDANDKATJA KEY DOLINE S           Ot      

        M51.36     

                          OTHER INTERVERTEBRAL DISC DEGENERATION,               

      

 

                2017           [facility] DRIVER DOQUELINE S           Ot      

        M51.36     

                          OTHER INTERVERTEBRAL DISC DEGENERATION,               

      

 

                2017           [facility]AT[place] DRIVER DOLINE S           Ot      

        M51.36     

                          OTHER INTERVERTEBRAL DISC DEGENERATION,               

      

 

                08/10/2017           KATJA DRIVER DOLINE S           Ot      

        M51.36     

                          OTHER INTERVERTEBRAL DISC DEGENERATION,               

      

 

                2018           AKANKSHA DRIVER DO           Ot      

        M48.02     

                          SPINAL STENOSIS, CERVICAL REGION                    

 

                2018           AKANKSHA DRIVER DO S           Ot      

        M50.322    

                          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              

      

 

                2018           KRISTEL CASE, AKANKSHA S           Ot      

        M99.71     

                          CONN TISS AND DISC STENOSIS OF INTVRT FO              

      

 

                2018           KRISTEL CASE, AKANKSHA CHEATHAM           Ot      

        M48.02     

                          SPINAL STENOSIS, CERVICAL REGION                    

 

                2018           KRISTEL CASE, AKANKSHA S           Ot      

        M50.322    

                          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              

      

 

                2018           KRISTEL CASE, KAANKSHA S           Ot      

        M99.71     

                          CONN TISS AND DISC STENOSIS OF INTVRT FO              

      

 

                2018           SYL, ZAYRA R APRN           Ot      

        M19.90     

                          UNSPECIFIED OSTEOARTHRITIS, UNSPECIFIED               

      

 

                2018           SYL, ZAYRA R APRN           Ot      

        M48.00     

                          SPINAL STENOSIS, SITE UNSPECIFIED                    

 

                2018           SYL, ZAYRA R APRN           Ot      

        M19.90     

                          UNSPECIFIED OSTEOARTHRITIS, UNSPECIFIED               

      

 

                2018           SYL, ZAYRA R APRN           Ot      

        M48.00     

                          SPINAL STENOSIS, SITE UNSPECIFIED                    

 

             2018                        Ot           611.71           MAS

TODYNIA       

                                                 

 

             2018                        Ot           Z01.818           EN

COUNTER FOR 

OTHER PREPROCEDURAL EXAMIN                       

 

             2018                        Ot           Z12.11           ENC

OUNTER FOR 

SCREENING FOR MALIGNANT NE                       

 

             2018                        Ot           Z80.0           FAMI

LY HISTORY OF 

MALIGNANT NEOPLASM OF                            

 

                2018           AKANKSHA DRIVER DO           Ot      

        N64.4      

                          MASTODYNIA                         

 

                2018           KATJA DRIVER DOLINE S           Ot      

        M48.02     

                          SPINAL STENOSIS, CERVICAL REGION                    

 

                2018           AKANKSHA DRIVER DO           Ot      

        M50.322    

                          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              

      

 

                2018           KRISTEL CASE, AKANKSHA S           Ot      

        M99.71     

                          CONN TISS AND DISC STENOSIS OF INTVRT FO              

      

 

             2018           TRISHA RAMSEY, LAW LOMAX           Ot           E11.

51           

TYPE 2 DIABETES W DIABETIC PERIPHERAL AN                    

 

             2018           TRISHA RAMSEY, LAW LOMAX           Ot           E66.

9           

OBESITY, UNSPECIFIED                             

 

             2018           TRISHA RAMSEY, LAW LOMAX           Ot           E78.

00           

PURE HYPERCHOLESTEROLEMIA, UNSPECIFIED                    

 

             2018           TRISHA RAMSEY, LAW LOMAX           Ot           F41.

9           

ANXIETY DISORDER, UNSPECIFIED                    

 

             2018           LAW RICO MD           Ot           I10 

          

ESSENTIAL (PRIMARY) HYPERTENSION                    

 

             2018           LAW RICO MD           Ot           I25.

10           

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                     

 

             2018           LAW RICO MD, Ot           I25.

2           

OLD MYOCARDIAL INFARCTION                        

 

             2018           LAW RICO MD           Ot           I73.

9           

PERIPHERAL VASCULAR DISEASE, UNSPECIFIED                    

 

             2018           LAW RICO MD           Ot           K21.

9           

GASTRO-ESOPHAGEAL REFLUX DISEASE WITHOUT                    

 

             2018           LAW RICO MD           Ot           K29.

00           

ACUTE GASTRITIS WITHOUT BLEEDING                    

 

             2018           LAW RICO MD           Ot           N39.

0           

URINARY TRACT INFECTION, SITE NOT SPECIF                    

 

             2018           LAW RICO MD           Ot           R11.

0           

NAUSEA                                           

 

             2018           LAW RICO MD           Ot           Z79.

82           

LONG TERM (CURRENT) USE OF ASPIRIN                    

 

                2018           LAW RICO MD           Ot            

  Z86.010          

                          PERSONAL HISTORY OF COLONIC POLYPS                    

 

             2018           LAW RICO MD           Ot           Z87.

19           

PERSONAL HISTORY OF OTHER DISEASES OF TH                    

 

                2018           LAW RICO MD           Ot            

  Z87.440          

                          PERSONAL HISTORY OF URINARY (TRACT) INFE              

      

 

             2018           LAW RICO MD           Ot           Z88.

8           

ALLERGY STATUS TO [facility] DRUG/MEDS/BIOL SUB                    

 

             2018           LAW RICO MD           Ot           Z90.

49           

ACQUIRED ABSENCE OF OTHER SPECIFIED PART                    

 

                2018           LAW RICO MD           Ot            

  Z90.710          

                          ACQUIRED ABSENCE OF BOTH CERVIX AND UTER              

      

 

             2018           LAW RICO MD           Ot           Z90.

89           

ACQUIRED ABSENCE OF OTHER ORGANS                    

 

             2018           LAW RICO MD           Ot           Z95.

1           

PRESENCE OF AORTOCORONARY BYPASS GRAFT                    

 

                2018           LAW RICO MD           Ot            

  Z98.890          

                          OTHER SPECIFIED POSTPROCEDURAL STATES                 

   

 

             2018           LAW RICO MD           Ot           E11.

51           

TYPE 2 DIABETES W DIABETIC PERIPHERAL AN                    

 

             2018           LAW RICO MD           Ot           E66.

9           

OBESITY, UNSPECIFIED                             

 

             2018           LAW RICO MD           Ot           E78.

00           

PURE HYPERCHOLESTEROLEMIA, UNSPECIFIED                    

 

             2018           LAW RICO MD           Ot           F41.

9           

ANXIETY DISORDER, UNSPECIFIED                    

 

             2018           LAW RICO MD           Ot           I10 

          

ESSENTIAL (PRIMARY) HYPERTENSION                    

 

             2018           LAW RICO MD           Ot           I25.

10           

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                     

 

             2018           LAW RICO MD           Ot           I25.

2           

OLD MYOCARDIAL INFARCTION                        

 

             2018           LAW RICO MD           Ot           I73.

9           

PERIPHERAL VASCULAR DISEASE, UNSPECIFIED                    

 

             2018           LAW RICO MD           Ot           K21.

9           

GASTRO-ESOPHAGEAL REFLUX DISEASE WITHOUT                    

 

             2018           LAW RICO MD           Ot           K29.

00           

ACUTE GASTRITIS WITHOUT BLEEDING                    

 

             2018           LAW RICO MD           Ot           N39.

0           

URINARY TRACT INFECTION, SITE NOT SPECIF                    

 

             2018           LAW RICO MD           Ot           R11.

0           

NAUSEA                                           

 

             2018           LAW RICO MD           Ot           Z79.

82           

LONG TERM (CURRENT) USE OF ASPIRIN                    

 

                2018           LAW RICO MD           Ot            

  Z86.010          

                          PERSONAL HISTORY OF COLONIC POLYPS                    

 

             2018           LAW RICO MD           Ot           Z87.

19           

PERSONAL HISTORY OF OTHER DISEASES OF TH                    

 

                2018           LAW RICO MD           Ot            

  Z87.440          

                          PERSONAL HISTORY OF URINARY (TRACT) INFE              

      

 

             2018           LAW RICO MD           Ot           Z88.

8           

ALLERGY STATUS TO [facility] DRUG/MEDS/BIOL SUB                    

 

             2018           LAW RICO MD           Ot           Z90.

49           

ACQUIRED ABSENCE OF OTHER SPECIFIED PART                    

 

                2018           LAW RICO MD           Ot            

  Z90.710          

                          ACQUIRED ABSENCE OF BOTH CERVIX AND UTER              

      

 

             2018           LAW RICO MD           Ot           Z90.

89           

ACQUIRED ABSENCE OF OTHER ORGANS                    

 

             2018           LAW RICO MD           Ot           Z95.

1           

PRESENCE OF AORTOCORONARY BYPASS GRAFT                    

 

                2018           LAW RICO MD           Ot            

  Z98.890          

                          OTHER SPECIFIED POSTPROCEDURAL STATES                 

   

 

                2018           AKANKSHA DRIVER DO           Ot      

        I25.10     

                          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               

      

 

                2018           AKANKSHA DRIVER DO           Ot      

        I51.7      

                          CARDIOMEGALY                       

 

                2018           AKANKSHA DRIVER DO           Ot      

        K44.9      

                          DIAPHRAGMATIC HERNIA WITHOUT OBSTRUCTION              

      

 

                2018           AKANKSHA DRIVER DO S           Ot      

        K57.30     

                          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              

      

 

                2018           ORENDER DO, AKANKSHA S           Ot      

        Z90.49     

                          ACQUIRED ABSENCE OF OTHER SPECIFIED PART              

      

 

                2018           ORENDER DO, AKANKSHA S           Ot      

        Z90.710    

                          ACQUIRED ABSENCE OF BOTH CERVIX AND UTER              

      

 

                2018           ORENDER DO, AKANKSHA S           Ot      

        Z95.828    

                          PRESENCE OF OTHER VASCULAR IMPLANTS AND               

      

 

                2019           ORENDER DOKATJAAKANKSHA S           Ot      

        I25.10     

                          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               

      

 

                2019           LINDANDER DO, AKANKSHA S           Ot      

        I51.7      

                          CARDIOMEGALY                       

 

                2019           LINDANDER DO, AKANKSHA S           Ot      

        K44.9      

                          DIAPHRAGMATIC HERNIA WITHOUT OBSTRUCTION              

      

 

                2019           ORENDER DO, AKANKSHA S           Ot      

        K57.30     

                          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              

      

 

                2019           ORENDER DO, AKANKSHA S           Ot      

        Z90.49     

                          ACQUIRED ABSENCE OF OTHER SPECIFIED PART              

      

 

                2019           ORENDER DO, AKANKSHA S           Ot      

        Z90.710    

                          ACQUIRED ABSENCE OF BOTH CERVIX AND UTER              

      

 

                2019           ORENDER DO, AKANKSHA S           Ot      

        Z95.828    

                          PRESENCE OF OTHER VASCULAR IMPLANTS AND               

      

 

                2019           JESUS HUIZAR DO           Ot            

  Z01.818          

                          ENCOUNTER FOR OTHER PREPROCEDURAL EXAMIN              

      

 

                2019           JESUS HUIZAR DO           Ot            

  Z01.818          

                          ENCOUNTER FOR OTHER PREPROCEDURAL EXAMIN              

      

 

             2019           JESUS HUIZAR DO           Ot           E11.

51           

TYPE 2 DIABETES W DIABETIC PERIPHERAL AN                    

 

             2019           JESUS HUIZAR DO           Ot           E66.

9           

OBESITY, UNSPECIFIED                             

 

             2019           JESUS HUIZAR DO           Ot           I10 

          

ESSENTIAL (PRIMARY) HYPERTENSION                    

 

             2019           JESUS HUIZAR DO           Ot           I25.

10           

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                     

 

             2019           JESUS HUIZAR DO           Ot           K21.

0           

GASTRO-ESOPHAGEAL REFLUX DISEASE WITH ES                    

 

             2019           JESUS HUIZAR DO           Ot           K22.

70           

JO'S ESOPHAGUS WITHOUT DYSPLASIA                    

 

             2019           JESUS HUIZAR DO           Ot           K31.

7           

POLYP OF STOMACH AND DUODENUM                    

 

             2019           MUNIR HUIZAR DOBEN HAILE           Ot           K44.

9           

DIAPHRAGMATIC HERNIA WITHOUT OBSTRUCTION                    

 

             2019           HUIZAR MUNIR CASEBEN HAILE           Ot           Z68.

38           

BODY MASS INDEX (BMI) 38.0-38.9, ADULT                    

 

             2019           JESUS HUIZAR DO LAZARA           Ot           Z79.

82           

LONG TERM (CURRENT) USE OF ASPIRIN                    

 

                2019           HUIZAR  JESUS D           Ot            

  Z79.899          

                          OTHER LONG TERM (CURRENT) DRUG THERAPY                

    

 

                2019           [facility], AKANKSHA S           Ot      

        Z29.8      

                          ENCOUNTER FOR OTHER SPECIFIED PROPHYLACT              

      

 

                2019           [facility], AKANKSHA S           Ot      

        Z29.8      

                          ENCOUNTER FOR OTHER SPECIFIED PROPHYLACT              

      

 

                2019           [facility], AKANKSHA S           Ot      

        Z29.8      

                          ENCOUNTER FOR OTHER SPECIFIED PROPHYLACT              

      

 

                10/30/2019           [facility], AKANKSHA S           Ot      

        Z29.8      

                          ENCOUNTER FOR OTHER SPECIFIED PROPHYLACT              

      

 

                01/10/2020           [facility], AKANKSHA S           Ot      

        M47.812    

                          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              

      

 

                01/10/2020           [facility], AKANKSHA S           Ot      

        M47.814    

                          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              

      

 

                2020           [facility], AKANKSHA S           Ot      

        M47.812    

                          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              

      

 

                2020           [facility], AKANKSHA S           Ot      

        M47.814    

                          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              

      

 

                2020           [facility], AKANKSHA S           Ot      

        Z29.8      

                          ENCOUNTER FOR OTHER SPECIFIED PROPHYLACT              

      



                                                                                
                                                                                
                                                                                
                                                                                
                                                  



Procedures

      



There is no data.                  



Results

      



                    Test                Result              Range        

 

                                        Complete blood count (CBC) with automate

d white blood cell (WBC) differential - 

17 16:48         

 

                          Blood leukocytes automated count (number/volume)      

     7.0 10*3/uL          

                                        4.3-11.0        

 

                          Blood erythrocytes automated count (number/volume)    

       4.23 10*6/uL       

                                        4.35-5.85        

 

                    Venous blood hemoglobin measurement (mass/volume)           

12.8 g/dL           

11.5-16.0        

 

                    Blood hematocrit (volume fraction)           38 %           

     35-52        

 

                    Automated erythrocyte mean corpuscular volume           90 [

foz_us]           

80-99        

 

                                        Automated erythrocyte mean corpuscular h

emoglobin (mass per erythrocyte)        

                          30 pg                     25-34        

 

                                        Automated erythrocyte mean corpuscular h

emoglobin concentration measurement 

(mass/volume)             34 g/dL                   32-36        

 

                    Automated erythrocyte distribution width ratio           13.

7 %              10.0-

14.5        

 

                    Automated blood platelet count (count/volume)           170 

10*3/uL           

130-400        

 

                          Automated blood platelet mean volume measurement      

     11.4 [foz_us]        

                                        7.4-10.4        

 

                    Automated blood neutrophils/100 leukocytes           46 %   

             42-75       

 

 

                    Automated blood lymphocytes/100 leukocytes           42 %   

             12-44       

 

 

                    Blood monocytes/100 leukocytes           8 %                

 0-12        

 

                    Automated blood eosinophils/100 leukocytes           3 %    

             0-10        

 

                    Automated blood basophils/100 leukocytes           0 %      

           0-10        

 

                    Blood neutrophils automated count (number/volume)           

3.2 10*3            

1.8-7.8        

 

                    Blood lymphocytes automated count (number/volume)           

3.0 10*3            

1.0-4.0        

 

                    Blood monocytes automated count (number/volume)           0.

5 10*3            

0.0-1.0        

 

                    Automated eosinophil count           0.2 10*3/uL           0

.0-0.3        

 

                    Automated blood basophil count (count/volume)           0.0 

10*3/uL           

0.0-0.1        

 

                                        PT panel in platelet poor plasma by coag

ulation assay - 17 16:48         

 

                          Prothrombin time (PT) in platelet poor plasma by coagu

lation assay           

13.1 s                                  12.2-14.7        

 

                          INR in platelet poor plasma or blood by coagulation as

say           1.0         

                                        0.8-1.4        

 

                                        Activated partial thromboplastin time (a

PTT) in platelet poor plasma 

bycoagulation assay - 17 16:48         

 

                                        Activated partial thromboplastin time (a

PTT) in platelet poor plasma 

bycoagulation assay           25 s                      24-35        

 

                                        Comprehensive metabolic panel - 17

 16:48         

 

                          Serum or plasma sodium measurement (moles/volume)     

      142 mmol/L          

                                        135-145        

 

                          Serum or plasma potassium measurement (moles/volume)  

         4.3 mmol/L       

                                        3.6-5.0        

 

                          Serum or plasma chloride measurement (moles/volume)   

        112 mmol/L        

                                                

 

                    Carbon dioxide           21 mmol/L           21-32        

 

                          Serum or plasma anion gap determination (moles/volume)

           9 mmol/L       

                                        5-14        

 

                          Serum or plasma urea nitrogen measurement (mass/volume

)           25 mg/dL      

                                        7-18        

 

                          Serum or plasma creatinine measurement (mass/volume)  

         1.14 mg/dL       

                                        0.60-1.30        

 

                    Serum or plasma urea nitrogen/creatinine mass ratio         

  22                  NRG 

       

 

                                        Serum or plasma creatinine measurement w

ith calculation of estimated glomerular 

filtration rate           46                        NRG        

 

                    Serum or plasma glucose measurement (mass/volume)           

109 mg/dL           

        

 

                    Serum or plasma calcium measurement (mass/volume)           

9.5 mg/dL           

8.5-10.1        

 

                          Serum or plasma total bilirubin measurement (mass/volu

me)           0.3 mg/dL   

                                        0.1-1.0        

 

                                        Serum or plasma alkaline phosphatase carmen

surement (enzymatic activity/volume)    

                          69 U/L                            

 

                                        Serum or plasma aspartate aminotransfera

se measurement (enzymatic 

activity/volume)           20 U/L                    5-34        

 

                                        Serum or plasma alanine aminotransferase

 measurement (enzymatic activity/volume)

                          12 U/L                    0-55        

 

                    Serum or plasma protein measurement (mass/volume)           

6.3 g/dL            

6.4-8.2        

 

                    Serum or plasma albumin measurement (mass/volume)           

3.9 g/dL            

3.2-4.5        

 

                                        Magnesium - 17 16:48         

 

                    Magnesium           2.2 mg/dL           1.8-2.4        

 

                                        Serum or plasma creatine kinase measurem

ent (enzymatic activity/volume) - 

17 16:48         

 

                                        Serum or plasma creatine kinase measurem

ent (enzymatic activity/volume)         

                          48 U/L                            

 

                                        Serum or plasma creatine kinase MB measu

rement (enzymatic activity/volume) - 

17 16:48         

 

                                        Serum or plasma creatine kinase MB measu

rement (enzymatic activity/volume)      

                          1.3 ng/mL                 <6.6        

 

                                        Serum or plasma troponin i.cardiac measu

rement (mass/volume) - 17 16:48   

      

 

                          Serum or plasma troponin i.cardiac measurement (mass/v

olume)           < ng/mL  

                                        <0.30        

 

                                        Serum or plasma amylase measurement (enz

ymatic activity/volume) - 17 16:48

         

 

                          Serum or plasma amylase measurement (enzymatic activit

y/volume)           36 U/L

                                                

 

                                        Complete urinalysis with reflex to cultu

re - 18 16:28         

 

                    Urine color determination           YELLOW              NRG 

       

 

                    Urine clarity determination           CLEAR               NR

G        

 

                    Urine pH measurement by test strip           6.5            

     5-9        

 

                    Specific gravity of urine by test strip           1.010     

          1.016-1.022  

      

 

                          Urine protein assay by test strip, semi-quantitative  

         NEGATIVE         

                                        NEGATIVE        

 

                    Urine glucose detection by automated test strip           NE

GATIVE            

NEGATIVE        

 

                          Erythrocytes detection in urine sediment by light micr

oscopy           NEGATIVE 

                                        NEGATIVE        

 

                    Urine ketones detection by automated test strip           NE

GATIVE            

NEGATIVE        

 

                    Urine nitrite detection by test strip           POSITIVE    

        NEGATIVE    

    

 

                    Urine total bilirubin detection by test strip           NEGA

TIVE            

NEGATIVE        

 

                          Urine urobilinogen measurement by automated test strip

 (mass/volume)           

NORMAL                                  NORMAL        

 

                    Urine leukocyte esterase detection by dipstick           2+ 

                 NEGATIVE 

       

 

                                        Automated urine sediment erythrocyte cou

nt by microscopy (number/high power 

field)                    NONE                      NRG        

 

                                        Automated urine sediment leukocyte count

 by microscopy (number/high power field)

                           [HPF]                    NRG        

 

                          Bacteria detection in urine sediment by light microsco

py           LARGE        

                                        NRG        

 

                          Crystals detection in urine sediment by light microsco

py           NONE         

                                        NRG        

 

                    Casts detection in urine sediment by light microscopy       

    NONE                

NRG        

 

                          Mucus detection in urine sediment by light microscopy 

          NEGATIVE        

                                        NRG        

 

                    Complete urinalysis with reflex to culture           YES    

             NRG        

 

                                        Bacterial urine culture - 18 16:28

         

 

                    Bacterial urine culture           [id number]            NRG  

      

 

                    COLONY COUNT           >100,000/ML            NRG        

 

                    FTX;REPORTABLE           SENSTIVITY REPORT SENT BY [facility]  

09:05            NRG

        

 

                                        [facility] Sensitivity Panel - 18 16:28  

       

 

                          Gentamicin susceptibility test by minimum inhibitory c

oncentration           <= 

                                        NRG        

 

                                        Trimethoprim/sulfamethoxazole susceptibi

lity test by minimum 

inhibitoryconcentration           <=                        NRG        

 

                          Levofloxacin susceptibility test by minimum inhibitory

 concentration           >

                                        NRG        

 

                          Ampicillin susceptibility test by minimum inhibitory c

oncentration           8  

                                        NRG        

 

                          Cefazolin susceptibility test by minimum inhibitory co

ncentration           2   

                                        NRG        

 

                          Ceftriaxone susceptibility test by minimum inhibitory 

concentration           <=

                                        NRG        

 

                          Ciprofloxacin susceptibility test by minimum inhibitor

y concentration           

>                                       NRG        

 

                          Meropenem susceptibility test by minimum inhibitory co

ncentration           <=  

                                        NRG        

 

                                        Nitrofurantoin susceptibility test by mi

nimum inhibitory concentration          

                          <=                        NRG        

 

                    Amoxicillin and clavulanate potassium susc JONAS           <= 

                 NRG      

  

 

                                        Complete blood count (CBC) with automate

d white blood cell (WBC) differential - 

18 18:04         

 

                          Blood leukocytes automated count (number/volume)      

     7.2 10*3/uL          

                                        4.3-11.0        

 

                          Blood erythrocytes automated count (number/volume)    

       4.16 10*6/uL       

                                        4.35-5.85        

 

                    Venous blood hemoglobin measurement (mass/volume)           

12.9 g/dL           

11.5-16.0        

 

                    Blood hematocrit (volume fraction)           38 %           

     35-52        

 

                    Automated erythrocyte mean corpuscular volume           91 [

foz_us]           

80-99        

 

                                        Automated erythrocyte mean corpuscular h

emoglobin (mass per erythrocyte)        

                          31 pg                     25-34        

 

                                        Automated erythrocyte mean corpuscular h

emoglobin concentration measurement 

(mass/volume)             34 g/dL                   32-36        

 

                    Automated erythrocyte distribution width ratio           13.

0 %              10.0-

14.5        

 

                    Automated blood platelet count (count/volume)           164 

10*3/uL           

130-400        

 

                          Automated blood platelet mean volume measurement      

     10.3 [foz_us]        

                                        7.4-10.4        

 

                    Automated blood neutrophils/100 leukocytes           38 %   

             42-75       

 

 

                    Automated blood lymphocytes/100 leukocytes           48 %   

             12-44       

 

 

                    Blood monocytes/100 leukocytes           9 %                

 0-12        

 

                    Automated blood eosinophils/100 leukocytes           4 %    

             0-10        

 

                    Automated blood basophils/100 leukocytes           1 %      

           0-10        

 

                    Blood neutrophils automated count (number/volume)           

2.8 10*3            

1.8-7.8        

 

                    Blood lymphocytes automated count (number/volume)           

3.5 10*3            

1.0-4.0        

 

                    Blood monocytes automated count (number/volume)           0.

7 10*3            

0.0-1.0        

 

                    Automated eosinophil count           0.3 10*3/uL           0

.0-0.3        

 

                    Automated blood basophil count (count/volume)           0.0 

10*3/uL           

0.0-0.1        

 

                                        Comprehensive metabolic panel - 18

 18:04         

 

                          Serum or plasma sodium measurement (moles/volume)     

      139 mmol/L          

                                        135-145        

 

                          Serum or plasma potassium measurement (moles/volume)  

         3.8 mmol/L       

                                        3.6-5.0        

 

                          Serum or plasma chloride measurement (moles/volume)   

        107 mmol/L        

                                                

 

                    Carbon dioxide           20 mmol/L           21-32        

 

                          Serum or plasma anion gap determination (moles/volume)

           12 mmol/L      

                                        5-14        

 

                          Serum or plasma urea nitrogen measurement (mass/volume

)           26 mg/dL      

                                        7-18        

 

                          Serum or plasma creatinine measurement (mass/volume)  

         0.90 mg/dL       

                                        0.60-1.30        

 

                    Serum or plasma urea nitrogen/creatinine mass ratio         

  29                  NRG 

       

 

                                        Serum or plasma creatinine measurement w

ith calculation of estimated glomerular 

filtration rate           60                        NRG        

 

                    Serum or plasma glucose measurement (mass/volume)           

73 mg/dL            

        

 

                    Serum or plasma calcium measurement (mass/volume)           

9.8 mg/dL           

8.5-10.1        

 

                          Serum or plasma total bilirubin measurement (mass/volu

me)           0.4 mg/dL   

                                        0.1-1.0        

 

                                        Serum or plasma alkaline phosphatase carmen

surement (enzymatic activity/volume)    

                          70 U/L                            

 

                                        Serum or plasma aspartate aminotransfera

se measurement (enzymatic 

activity/volume)           17 U/L                    5-34        

 

                                        Serum or plasma alanine aminotransferase

 measurement (enzymatic activity/volume)

                          10 U/L                    0-55        

 

                    Serum or plasma protein measurement (mass/volume)           

6.9 g/dL            

6.4-8.2        

 

                    Serum or plasma albumin measurement (mass/volume)           

4.1 g/dL            

3.2-4.5        

 

                    CALCIUM CORRECTED           9.7 mg/dL           8.5-10.1    

    

 

                                        Lipase - 18 18:04         

 

                    Lipase              51 U/L              8-78        

 

                                        Complete blood count (CBC) with automate

d white blood cell (WBC) differential - 

02/10/20 20:25         

 

                          Blood leukocytes automated count (number/volume)      

     7.0 10*3/uL          

                                        4.3-11.0        

 

                          Blood erythrocytes automated count (number/volume)    

       4.10 10*6/uL       

                                        4.35-5.85        

 

                    Venous blood hemoglobin measurement (mass/volume)           

12.2 g/dL           

11.5-16.0        

 

                    Blood hematocrit (volume fraction)           38 %           

     35-52        

 

                    Automated erythrocyte mean corpuscular volume           92 [

foz_us]           

80-99        

 

                                        Automated erythrocyte mean corpuscular h

emoglobin (mass per erythrocyte)        

                          30 pg                     25-34        

 

                                        Automated erythrocyte mean corpuscular h

emoglobin concentration measurement 

(mass/volume)             32 g/dL                   32-36        

 

                    Automated erythrocyte distribution width ratio           13.

3 %              10.0-

14.5        

 

                    Automated blood platelet count (count/volume)           167 

10*3/uL           

130-400        

 

                          Automated blood platelet mean volume measurement      

     10.6 [foz_us]        

                                        7.4-10.4        

 

                    Automated blood neutrophils/100 leukocytes           40 %   

             42-75       

 

 

                    Automated blood lymphocytes/100 leukocytes           48 %   

             12-44       

 

 

                    Blood monocytes/100 leukocytes           8 %                

 0-12        

 

                    Automated blood eosinophils/100 leukocytes           4 %    

             0-10        

 

                    Automated blood basophils/100 leukocytes           0 %      

           0-10        

 

                    Blood neutrophils automated count (number/volume)           

2.8 10*3            

1.8-7.8        

 

                    Blood lymphocytes automated count (number/volume)           

3.4 10*3            

1.0-4.0        

 

                    Blood monocytes automated count (number/volume)           0.

6 10*3            

0.0-1.0        

 

                    Automated eosinophil count           0.3 10*3/uL           0

.0-0.3        

 

                    Automated blood basophil count (count/volume)           0.0 

10*3/uL           

0.0-0.1        

 

                                        Comprehensive metabolic panel - 02/10/20

 20:25         

 

                          Serum or plasma sodium measurement (moles/volume)     

      140 mmol/L          

                                        135-145        

 

                          Serum or plasma potassium measurement (moles/volume)  

         3.8 mmol/L       

                                        3.6-5.0        

 

                          Serum or plasma chloride measurement (moles/volume)   

        108 mmol/L        

                                                

 

                    Carbon dioxide           21 mmol/L           21-32        

 

                          Serum or plasma anion gap determination (moles/volume)

           11 mmol/L      

                                        5-14        

 

                          Serum or plasma urea nitrogen measurement (mass/volume

)           14 mg/dL      

                                        7-18        

 

                          Serum or plasma creatinine measurement (mass/volume)  

         0.95 mg/dL       

                                        0.60-1.30        

 

                    Serum or plasma urea nitrogen/creatinine mass ratio         

  15                  NRG 

       

 

                                        Serum or plasma creatinine measurement w

ith calculation of estimated glomerular 

filtration rate           57                        NRG        

 

                    Serum or plasma glucose measurement (mass/volume)           

105 mg/dL           

        

 

                    Serum or plasma calcium measurement (mass/volume)           

9.3 mg/dL           

8.5-10.1        

 

                          Serum or plasma total bilirubin measurement (mass/volu

me)           0.3 mg/dL   

                                        0.1-1.0        

 

                                        Serum or plasma alkaline phosphatase carmen

surement (enzymatic activity/volume)    

                          68 U/L                            

 

                                        Serum or plasma aspartate aminotransfera

se measurement (enzymatic 

activity/volume)           17 U/L                    5-34        

 

                                        Serum or plasma alanine aminotransferase

 measurement (enzymatic activity/volume)

                          15 U/L                    0-55        

 

                    Serum or plasma protein measurement (mass/volume)           

6.7 g/dL            

6.4-8.2        

 

                    Serum or plasma albumin measurement (mass/volume)           

4.0 g/dL            

3.2-4.5        

 

                    CALCIUM CORRECTED           9.3 mg/dL           8.5-10.1    

    

 

                                        Magnesium - 02/10/20 20:25         

 

                    Magnesium           2.0 mg/dL           1.6-2.4        

 

                                        Myoglobin, serum - 02/10/20 20:25       

  

 

                    Myoglobin, serum           52.2 ng/mL           10.0-92.0   

     

 

                                        Serum or plasma troponin i.cardiac measu

rement (mass/volume) - 02/10/20 20:25   

      

 

                          Serum or plasma troponin i.cardiac measurement (mass/v

olume)           < ng/mL  

                                        <0.028        

 

                                        Serum or plasma lithium measurement (mol

es/volume) - 02/10/20 20:25         

 

                    BNP PT              322.6 pg/mL           <100.0        

 

                                        PT panel in platelet poor plasma by coag

ulation assay - 02/10/20 20:25         

 

                          Prothrombin time (PT) in platelet poor plasma by coagu

lation assay           

12.7 s                                  12.2-14.7        

 

                          INR in platelet poor plasma or blood by coagulation as

say           0.9         

                                        0.8-1.4        

 

                                        Activated partial thromboplastin time (a

PTT) in platelet poor plasma 

bycoagulation assay - 02/10/20 20:25         

 

                                        Activated partial thromboplastin time (a

PTT) in platelet poor plasma 

bycoagulation assay           26 s                      24-35        



                                                            



Encounters

      



                ACCT No.           Visit Date/Time           Discharge          

 Status         

             Pt. Type           Provider           Facility           Loc./Unit 

          

Complaint        

 

                2020 23:40:03           2020 23:59:

59           CLS

                Outpatient           Akanksha Driver                     

         

                                                 

 

                    [id number]           02/10/2020 19:42:00           02/10/2

020 21:49:00        

                DIS             Emergency           CORNELIA WALKER         

  [facility]           ER                        BLOOD PRESSURE HIGH,HEA

RT RACING    

    

 

                    [id number]           2020 09:54:00            23:59:59        

                CLS             Outpatient           AKANKSHA DRIVER DO S   

        [facility] 

[facility]           RAD                       CERVICAL/THORAC

IC PAIN     

   

 

                    [id number]           10/31/2019 00:14:00           10/31/2

019 23:59:59        

                CLS             Preadmit           AKANKSHA DRIVER DO S     

      [facility] 

[facility]           CR3                       WELLNESS       

 

 

                    [id number]           10/28/2019 14:46:00           10/30/2

019 00:01:00        

                DIS             Outpatient           KATJA DRIVER DOLINE S   

        [facility] 

[facility]           CR3                       WELLNESS       

 

 

                    [id number]           2019 13:10:00            00:01:00        

                DIS             Outpatient           KATJA DRIVER DOLINE S   

        [facility] 

[facility]           CR3                       [place]LL[place]       

 

 

                    [id number]           2019 13:07:00            00:01:00        

                DIS             Outpatient           KATJA DRIVER DOLINE S   

        [facility] 

[facility]           CR3                       [place]LL[place]       

 

 

                    [id number]           2019 13:25:00           

019 00:01:00        

                DIS             Outpatient           AKANKSHA DRIVER DO S   

        [facility] 

[facility]           CR3                       WELLNESS       

 

 

                    [id number]           2019 06:54:00           

019 09:10:00        

                DIS             Outpatient           HUIZAR JESUS CASE         

  [facility]           ENDO                      GERD/EPIGASTRIC ABD GIN

N        

 

                    [id number]           2019 06:22:00           

019 14:08:00        

                DIS             Outpatient           JESUS HUIZAR DO         

  [facility]           PREOP                     EGD        

 

                    [id number]           2018 13:28:00           

018 23:59:59        

                CLS             Outpatient           AKANKSHA DRIVER DO S   

        [facility] 

[facility]           RAD                       UPPER ABDOMINAL

 PAIN, BOWEL

 CHANGES        

 

                    [id number]           2018 16:21:00           

018 20:15:00        

                DIS             Emergency           LAW RICO MD          

 [facility]           ER                        NOT FEELING WELL/ABD PA

IN        

 

                    [id number]           2018 11:15:00           

018 13:09:00        

                DIS             Outpatient           ZAYRA STREETER   

        [facility] 

[facility]           REHAB                     ARTHRITIS AND S

TENOSIS   

     

 

                    [id number]           2018 09:13:00           

018 23:59:59        

                CLS             Outpatient           AKANKSHA DRIVER DO S   

        [facility] 

[facility]           RAD                       R CERVICAL PAIN

 WITH R ARM 

RADICULOPATHY        

 

                    [id number]           08/10/2017 14:45:00           08/10/2

017 15:31:00        

                DIS             Outpatient           AKANKSHA DRIVER DO S   

        [facility] 

[facility]           REHAB                     LOW BACK PAIN; 

LUMBAR DDD

        

 

                    [id number]           2017 09:08:00           

017 23:59:59        

                CLS             Outpatient           KATJA DRIVER DOLINE S   

        [facility] 

[facility]           RAD                       RAMSES MASTALGIA  

      

 

                    [id number]           2017 15:19:00           

017 19:29:00        

                DIS             Emergency           CLAUDETTE RIDER DO [facility]           ER                        FALL/COLLAR BONE INJ/R 

SIDE PAIN    

    

 

                    [id number]           10/15/2015 13:58:00           10/15/2

015 17:00:00        

                DIS             Outpatient           JESUS HUIZAR DO         

  [facility]                       FAMILY HX COLON CA, QUE

SIONABLE 

BARRETTS        

 

                    [id number]           04/15/2015 14:57:00           04/15/2

015 15:33:00        

                DIS             Outpatient           AKANKSHA DRIVER DO   

        [facility] 

[facility]           REHAB                     THORACIC BACK P

AIN /  

SPASM        

 

                    [id number]           2013 16:15:00           

013 18:56:00        

                DIS             Emergency           PREETI RAMSEY, CAITIE HOFF    

       [facility] 

[facility]           ER                        HEADACHE,EAR PA

IN        

 

             [id number]           10/12/2015 05:44:00                         

            

Document Registration                                                           

         

 

             [id number]           2015 19:30:00                         

            

Document Registration                                                           

         

 

             [id number]           04/15/2013 08:27:00                         

            

Document Registration                                                           

         

 

             [id number]           2012 12:55:00                         

            

Document Registration

## 2020-02-19 NOTE — XMS REPORT
CCD document using C-CDA

                             Created on: 2020



Leilani Ramírez

External Reference #: [id number]

: 1940

Sex: Female



Demographics





                          Address                   [address] [place]  [address]

 

                          Home Phone                [phone]

 

                          Preferred Language        Unknown

 

                          Marital Status            

 

                          Gnosticism Affiliation     Unknown

 

                          Race                      White

 

                          Ethnic Group              Not  or 





Author





                          Author                    Leilani Driver D.O.

 

                          Organization              [facility]

 

                          Address                   [address] [place]  [address]



 

                          Phone                     [phone]







Care Team Providers





                    Care Team Member Name Role                Phone

 

                    Akanksha Driver D.O., PP                  Unavailable

 

                          CCM                       Unavailable







Summary Purpose

Interface Exchange



Insurance Providers





             Payer name   Policy type / Coverage type Covered party ID Effective

 Begin Date 

Effective End Date

 

             [employer] [place] [id number]  2018   

  Unknown

 

             [employer] Senior Supplemental Medicare Part B [id number]   [id number]    

 Unknown







Family history





Father



                          Diagnosis                 Age At Onset

 

                          Heart disease             Unknown







Mother



                          Diagnosis                 Age At Onset

 

                          Heart disease             Unknown

 

                          Cancer                    Unknown







Social History





                Social History Element Codes           Description     Effective

 Dates

 

                Tobacco history SNOMED CT: 058944139 Never smoker    2011

 

                Marital status  Unknown         Single          2010

 

                Number of children Unknown         9               2010







Allergies, Adverse Reactions, Alerts





           Substance  Reaction   Codes      Entered Date Inactivated Date Status

 

           _                     Unknown    2019 No Inactive Date Active

 

           * NO KNOWN FOOD ALLERGIES            Unknown    2010 No Inactiv

e Date Active

 

           _                     Unknown    2010 No Inactive Date Active

 

           QUINIDINE-QUININE ANALOGUES hives,     Unknown    2010 No Inact

diamante Date Active







Problems





                Condition       Codes           Effective Dates Condition Status

 

                          Essential hypertension    ICD-9: 401.9

ICD-10: I10               2014                Active

 

                          Palpitations              ICD-9: 785.1

ICD-10: R00.2             10/02/2018                Active

 

                          Stress reaction           ICD-9: 308.9

ICD-10: F43.0             2020                Active

 

                          Mixed hyperlipidemia      ICD-9: 272.4

ICD-10: E78.2             2019                Active

 

                          FLU VACCINE               ICD-9: V04.81

ICD-10: Z23               2012                Active

 

                          Acute serous otitis media, left ear ICD-9: 381.01

ICD-10: H65.02            2019                Active

 

                          Coronary atherosclerosis due to calcified coronary les

ion ICD-9: 414.00

ICD-10: I25.84            2018                Active

 

                          Hypoglycemia, unspecified ICD-9: 251.2

ICD-10: E16.2             2017                Active

 

                          Other fatigue             ICD-9: 780.79

ICD-10: R53.83            2017                Active

 

                          Urinary tract infection, site not specified ICD-9: 599

.0

ICD-10: N39.0             10/02/2018                Active

 

                          Gastro-esophageal reflux disease without esophagitis I

CD-9: 530.81

ICD-10: K21.9             2018                Active

 

                          Epigastric pain           ICD-9: 789.06

ICD-10: R10.13            2018                Active

 

                          Atherosclerotic heart disease of native coronary arter

y without angina pectoris 

ICD-9: 414.00

ICD-10: I25.10            2018                Active

 

                          Generalized anxiety disorder ICD-9: 300.00

ICD-10: F41.1             2018                Active

 

                          Dizziness and giddiness   ICD-9: 780.4

ICD-10: R42               2014                Active

 

                          Occlusion and stenosis of bilateral carotid arteries I

CD-9: 433.10

ICD-10: I65.23            2018                Active

 

                          Cervicalgia               ICD-9: 723.1

ICD-10: M54.2             2018                Active

 

                          Other spondylosis with radiculopathy, cervical region 

ICD-9: 721.0

ICD-10: M47.22            2018                Active

 

                          Cervicocranial syndrome   ICD-9: 723.2

ICD-10: M53.0             2018                Active

 

                          Vertigo of central origin, bilateral ICD-9: 386.2

ICD-10: H81.43            2018                Active

 

                          Benign paroxysmal vertigo, bilateral ICD-9: 386.11

ICD-10: H81.13            2018                Active

 

                          Otalgia, bilateral        ICD-9: 388.70

ICD-10: H92.03            2018                Active

 

                          Left lower quadrant pain  ICD-9: 789.04

ICD-10: R10.32            2017                Active

 

                          Low back pain             ICD-9: 724.2

ICD-10: M54.5             2017                Active

 

                          Radiculopathy, lumbosacral region ICD-9: 724.4

ICD-10: M54.17            2018                Active

 

                          Impaired fasting glucose  ICD-9: 790.29

ICD-10: R73.01            2012                Active

 

                          Other intervertebral disc degeneration, lumbar region 

ICD-9: 722.52

ICD-10: M51.36            2017                Active

 

                          Pain in thoracic spine    ICD-9: 724.1

ICD-10: M54.6             2017                Active

 

                          Mastodynia                ICD-9: 611.71

ICD-10: N64.4             2017                Active

 

                          Acute upper respiratory infection, unspecified ICD-9: 

465.9

ICD-10: J06.9             2017                Active

 

                          Acute mastoiditis without complications, right ear ICD

-9: 383.00

ICD-10: H70.001           2016                Active

 

                          Constipation, unspecified ICD-9: 564.00

ICD-10: K59.00            2016                Active

 

                          Chronic kidney disease, stage 1 ICD-9: 585.1

ICD-10: N18.1             03/15/2016                Active

 

                          History of falling        ICD-9: V15.88

ICD-10: Z91.81            12/15/2015                Active

 

                          Muscle weakness (generalized) ICD-9: 780.79

ICD-10: M62.81            2015                Active

 

                Acute renal failure ICD-9: 584.9    2015      Active

 

                POLYURIA        ICD-9: 788.42   2015      Active

 

                CAD             ICD-9: 414.00   09/10/2015      Active

 

                Hyperglycemia   ICD-9: 790.29   2012      Active

 

                HYPERLIPIDEMIA NEC/NOS ICD-9: 272.4    09/10/2015      Active

 

                ABDOMINAL PAIN  ICD-9: 789.00   10/10/2012      Active

 

                Grieving        ICD-9: 309.0    2015      Active

 

                HYPOGLYCEMIA    ICD-9: 251.2    2012      Active

 

                MALAISE AND FATIGUE ICD-9: 780.79   2015      Active

 

                CERUMEN IMPACTION ICD-9: 380.4    2015      Active

 

                Leg pain        ICD-9: 729.5    2015      Active

 

                SUPERFIC PHLEBITIS-LEG ICD-9: 451.0    2015      Active

 

                SPASM OF MUSCLE ICD-9: 728.85   2015      Active

 

                Thoracic back pain ICD-9: 724.1    2015      Active

 

                Benign positional vertigo ICD-9: 386.11   2015      Active

 

                Suspicious nevus ICD-9: 238.2    2015      Active

 

                EUSTACHIAN TUBE DYSFUNCTION ICD-9: 381.81   2014      Acti

ve

 

                SINUSITIS, ACUTE ICD-9: 461.9    2014      Active

 

                DIZZINESS/VERTIGO ICD-9: 780.4    2014      Active

 

                HYPERTENSION    ICD-9: 401.9    2014      Active

 

                URI, ACUTE      ICD-9: 465.9    10/15/2013      Active

 

                CEPHALGIA       ICD-9: 784.0    2013      Active

 

                OTITIS MEDIA NOS ICD-9: 382.9    2013      Active

 

                Perforation of ear drum ICD-9: 384.20   05/10/2013      Active

 

                Mastalgia       ICD-9: 611.71   2013      Active

 

                DYSPEPSIA       ICD-9: 536.8    10/10/2012      Active

 

                IBS             ICD-9: 564.1    10/10/2012      Active

 

                FLU VACCINE     ICD-9: V04.81   2012      Active

 

                Radiculopathy of leg ICD-9: 724.4    2012      Active

 

                SCIATICA        ICD-9: 724.3    2012      Active

 

                Lumbar degenerative disc disease ICD-9: 722.52   2012     

 Active

 

                Sacroiliac dysfunction ICD-9: 739.4    2012      Active

 

                Hypertension    Unknown         2012      Active

 

                PAIN, LOWER BACK ICD-9: 724.2    2011      Active

 

                SPINAL ENTHESOPATHY ICD-9: 720.1    2011      Active

 

                Hypotension     ICD-9: 458.9    2011      Active

 

                SACROILIITIS NEC ICD-9: 720.2    2011      Active

 

                PHARYNGITIS, ACUTE ICD-9: 462      2010      Active

 

                URINARY TRACT INFECTION ICD-9: 599.0    2010      Active

 

                Heat exhaustion ICD-9: 992.5    2010      Active

 

                Esophagitis     ICD-9: 530.10   2010      Active

 

                Gastritis       ICD-9: 535.50   2010      Active

 

                GERD            ICD-9: 530.81   2010      Active

 

                Hiatal hernia   ICD-9: 553.3    2010      Active

 

                B12 DEFIC ANEMIA NEC ICD-9: 281.1    2010      Active

 

                Anxiety         Unknown         2010      Active

 

                Heart disease   Unknown         2010      Active

 

                Hyperlipidemia  Unknown         2010      Active

 

                ANXIETY STATE NOS ICD-9: 300.00   2010      Active

 

                DEPRESSIVE DISORDER NEC ICD-9: 311      2010      Active







Medications





          Medication Codes     Instructions Start Date Stop Date Status    Fill 

Instructions

 

                    Flonase Allergy Relief 50 mcg/actuation nasal spray,suspensi

on RxNorm: 8911918     2

Spray Nasal every night at bedtime 2020      Inactive     

    

 

           simvastatin 40 mg tablet RxNorm: 889015 1 Tablet(s) PO QD 2019 

02/15/2020 

Active                                   

 

                omeprazole 40 mg capsule,delayed release RxNorm: 092331  1 Capsu

le(s) Oral QD 

2019          Active               

 

                Xanax 0.25 mg tablet RxNorm: 321543  TAKE 1 TABLET BY MOUTH TWIC

E DAILY 

10/29/2019          No Stop Date        Active               

 

                omeprazole 40 mg capsule,delayed release RxNorm: 503749  1 Capsu

le(s) PO QD 

10/07/2019          2019          Inactive             

 

             metoprolol tartrate 25 mg tablet RxNorm: 060909 1 Tablet(s) PO BID 

2019                Active                     

 

                omeprazole 40 mg capsule,delayed release RxNorm: 258798  1 Capsu

le(s) PO QD 

2019          10/06/2019          Inactive             

 

                    Flonase Allergy Relief 50 mcg/actuation nasal spray,suspensi

on RxNorm: 0909284     2

[facility] NASAL QHS 2019      Inactive         

 

           simvastatin 40 mg tablet RxNorm: 763273 1 Tablet(s) PO QD 2019 

Inactive                                 

 

           simvastatin 40 mg tablet RxNorm: 878282 1 Tablet(s) PO QD 2019 

Inactive                                 

 

                omeprazole 40 mg capsule,delayed release RxNorm: 737715  1 Capsu

le(s) PO QD 

2019          Inactive             

 

           simvastatin 40 mg tablet RxNorm: 448239 1 Tablet(s) PO QD 2019 

Inactive                                 

 

                omeprazole 40 mg capsule,delayed release RxNorm: 405115  1 Capsu

le(s) PO QD 

2019          Inactive             

 

             Bactrim  mg-160 mg tablet RxNorm: 911599 1 Tablet(s) PO BID 0

3/08/2019   

2019                Inactive                   

 

             Bactrim  mg-160 mg tablet RxNorm: 330325 1 Tablet(s) PO BID 0

3/08/2019   

2019                Inactive                   

 

             Macrobid 100 mg capsule RxNorm: 098485 1 Capsule(s) PO BID 20

19   2019

                          Inactive                   

 

             metoprolol tartrate 25 mg tablet RxNorm: 334280 1 Tablet(s) PO BID 

2019                Inactive                   

 

                Xanax 0.25 mg tablet RxNorm: 957083  TAKE 1 TABLET BY MOUTH TWIC

E DAILY 

2018          10/28/2019          Inactive             

 

           Xanax 0.25 mg tablet RxNorm: 476097 1 Tablet(s) PO BID 2018 

Inactive                                 

 

                    Protonix 40 mg tablet,delayed release RxNorm: 355250      1 

Tablet(s) PO BID for 

stomach--replaces omeprazole 2018      Inactive         

 

             Carafate 1 gram tablet RxNorm: 241407 1 Tablet(s) PO AC & HS 2019                Inactive                   

 

           Xanax 0.25 mg tablet RxNorm: 571080 1 Tablet(s) PO BID 10/30/2018 12/

10/2018 

Inactive                                 

 

             Bactrim  mg-160 mg tablet RxNorm: 355150 1 Tablet(s) PO BID 1

   

10/08/2018                Inactive                   

 

             Bactrim  mg-160 mg tablet RxNorm: 926453 1 Tablet(s) PO BID 1

   

10/03/2018                Inactive                   

 

             Carafate 1 gram tablet RxNorm: 019843 1 Tablet(s) PO AC & HS 09/18/

2018   

10/17/2018                Inactive                   

 

                    Protonix 40 mg tablet,delayed release RxNorm: 907026      1 

Tablet(s) PO BID for 

stomach--replaces omeprazole 2018      Inactive         

 

                    Protonix 40 mg tablet,delayed release RxNorm: 663126      1 

Tablet(s) PO BID for 

stomach--replaces omeprazole 2018      Inactive         

 

                    fluticasone 50 mcg/actuation nasal spray,suspension RxNorm: 

1278766     2 Spray 

NASAL QD to each nostril 2018      Inactive         

 

             Nasonex 50 mcg/actuation Spray RxNorm: 4369298 2 [facility] NASAL 2018                Inactive                   

 

                omeprazole 40 mg capsule,delayed release RxNorm: 594993  1 Capsu

le(s) PO QD 

2018          08/15/2018          Inactive             

 

                    simvastatin 40 mg tablet RxNorm: 891020      1 Tablet(s) PO 

QD TAKE 1 TABLET EVERY 

DAY             2018      Inactive         

 

             metoprolol tartrate 25 mg tablet RxNorm: 487279 1/2 Tablet(s) PO QD

 2018                Inactive                   

 

                simvastatin 40 mg tablet RxNorm: 520876  Tablet(s) TAKE 1 TABLET

 EVERY DAY 

2017          Inactive             

 

             metoprolol tartrate 25 mg tablet RxNorm: 760928 1/2 Tablet(s) PO QD

 2017                Inactive                   

 

                    Flonase 50 mcg/actuation nasal spray,suspension RxNorm: 1797

933     2 [facility] NASAL 

BID             2017      Inactive         

 

                omeprazole 40 mg capsule,delayed release RxNorm: 736940  1 Capsu

le(s) PO QD 

2017          Inactive             

 

             metoprolol tartrate 25 mg tablet RxNorm: 133537 1/2 Tablet(s) PO QD

 04/10/2017   

2017                Inactive                   

 

                    ciprofloxacin 0.2 % ear drops in a dropperette RxNorm: 84933

6      4 Drop(s) OTIC TID

for 1 week      2016      Inactive         

 

           cefdinir 300 mg capsule RxNorm: 935782 2 Capsule(s) PO QD 2016 

Inactive                                 

 

                    omeprazole 40 mg capsule,delayed release RxNorm: 674701     

 TAKE 1 CAPSULE EVERY DAY

                2016      Inactive         

 

             simvastatin 40 mg tablet RxNorm: 367890 TAKE 1 TABLET EVERY DAY 2017                Inactive                   

 

                    Flonase 50 mcg/actuation nasal spray,suspension RxNorm: 1797

933     2 [facility] NASAL 

BID             2015      Inactive         

 

             cefuroxime axetil 250 mg tablet RxNorm: 238355 1 Tablet(s) PO BID 0

2015                Inactive                   

 

             cefuroxime axetil 250 mg tablet RxNorm: 830198 1 Tablet(s) PO BID 0

2015                Inactive                   

 

                omeprazole 40 mg capsule,delayed release RxNorm: 629524  1 Capsu

le(s) PO QD 

2015          Inactive             

 

           meloxicam 7.5 mg tablet RxNorm: 412584 1 Tablet(s) PO QD 2015 0

2015 

Inactive                                 

 

                loratadine 10 mg tablet RxNorm: 784990  1 Tablet(s) PO QAM for a

llergies 

2014          Inactive             

 

                    Flonase 50 mcg/actuation nasal spray,suspension RxNorm: 8963

23      2 [facility] NASAL BID

                2014      12/15/2015      Inactive         

 

           prednisone 20 mg tablet RxNorm: 599684 2 Tablet(s) PO BID 2014 

Inactive                                 

 

             Dyazide 37.5 mg-25 mg capsule RxNorm: 356972 1 Capsule(s) PO QAM 2014                Inactive                   

 

           Ceftin 500 mg tablet RxNorm: 033914 1 Tablet(s) PO BID 2014 

Inactive                                 

 

           Ceftin 500 mg tablet RxNorm: 802551 1 Tablet(s) PO BID 2014 

Inactive                                 

 

                    simvastatin 40 mg tablet RxNorm: 929611      Tablet(s) PO TA

KE ONE TABLET BY MOUTH 

EVERY DAY       2014      Inactive         

 

                    metoprolol tartrate 25 mg tablet RxNorm: 272084      Tablet(

s) PO TAKE ONE-HALF 

TABLET BY MOUTH EVERY DAY 2014      Inactive         

 

           Ceftin 500 mg tablet RxNorm: 101806 1 Tablet(s) PO BID 10/15/2013 10/

 

Inactive                                 

 

                loratadine 10 mg tablet RxNorm: 078043  1 Tablet(s) PO QAM for a

llergies 

2013          Inactive             

 

                    omeprazole 20 mg capsule,delayed release RxNorm: 054563     

 Capsule(s) PO TAKE ONE 

CAPSULE BY MOUTH TWICE DAILY 2013      Inactive         

 

                omeprazole 20 mg capsule,delayed release RxNorm: 037738  1 Capsu

le(s) PO BID 

2013          Inactive             

 

             Augmentin 875 mg-125 mg tablet RxNorm: 659484 1 Tablet(s) PO Q12H 0

5/10/2013   

2013                Inactive                   

 

             Floxin Otic Drops 1 bottle Drops RxNorm:      5 Drop(s) OTIC BID 05

/10/2013   

2013                Inactive                   

 

                    metoprolol tartrate 25 mg tablet RxNorm: 274117      Tablet(

s) PO TAKE ONE-HALF 

TABLET BY MOUTH EVERY DAY 2013      Inactive         

 

                    simvastatin 40 mg tablet RxNorm: 309750      Tablet(s) PO TA

KE ONE TABLET BY MOUTH 

EVERY DAY       2013      Inactive         

 

                lancets         RxNorm:         Misc Miscellaneous USE ONE TO CH

YOGI GLUCOSE EVERY DAY 

2013          Inactive             

 

             Carafate 1 gram tablet RxNorm: 346058 1 Tablet(s) PO AC & HS 2015                Inactive                   

 

           Flagyl 500 mg tablet RxNorm: 966436 1 Tablet(s) PO TID 10/10/2012 10/

 

Inactive                                 

 

             Carafate 1 gram tablet RxNorm: 851235 1 Tablet(s) PO AC & HS 10/10/

2012   

2012                Inactive                   

 

          One Touch Test strips RxNorm:   Miscellaneous QD 2012

 Inactive  

one touch ultra mini test strips

 

           Protonix 40 mg Tab RxNorm: 564075 1 Tablet(s) PO QD 2012 

Inactive                                 

 

           Protonix 40 mg Tab RxNorm: 793870 1 Tablet(s) PO QD 2012 10/09/

2012 

Inactive                                 

 

           prednisone 20 mg Tab RxNorm: 625760 1 Tablet(s) PO BID 2012 

Inactive                                 

 

             Flexeril 5 mg Tab RxNorm: 588014 1 Tablet(s) PO QHS for spasm 2012                Inactive                   

 

             Flexeril 5 mg Tab RxNorm: 548665 1 Tablet(s) PO QHS for spasm 2012                Inactive                   

 

                amlodipine 2.5 mg Tab RxNorm: 481208  1/2 Tablet(s) PO QD replac

es 5mg dose 

2012          Inactive             

 

           simvastatin 40 mg tablet RxNorm: 122755 1 Tablet(s) PO QD 2012 

Inactive                                 

 

             metoprolol tartrate 25 mg tablet RxNorm: 650857 1/2 Tablet(s) PO QD

 2012                Inactive                   

 

                omeprazole 20 mg capsule,delayed release RxNorm: 443829  1 Capsu

le(s) PO BID 

2012          Inactive             

 

           cefdinir 300 mg Cap RxNorm: 427425 2 Capsule(s) PO QD 2011 

Inactive                                 

 

           simvastatin 40 mg Tab RxNorm: 947102 1 Tablet(s) PO QD 2011 

Inactive                                 

 

             metoprolol tartrate 25 mg Tab RxNorm: 634740 1/2 Tablet(s) PO QD 10

/   

2012                Inactive                   

 

             metoprolol tartrate 25 mg Tab RxNorm: 901697 1/2 Tablet(s) PO QD 10

/   

10/24/2011                Inactive                   

 

           meclizine 25 mg Tab RxNorm: 3773826 1 Tablet(s) PO QHS 2011 

Inactive                                for dizziness

 

           Ceftin 500 mg Tab RxNorm: 023316 1 Tablet(s) PO BID 2011 

Inactive                                 

 

                omeprazole 20 mg Cap, Delayed Release RxNorm: 589115  1 Capsule(

s) PO BID 

2011          10/24/2011          Inactive             

 

           simvastatin 40 mg Tab RxNorm: 533976 1 Tablet(s) PO QD 2011 

Inactive                                 

 

           simvastatin 40 mg Tab RxNorm: 545882 1 Tablet(s) PO QD 2011 

Inactive                                 

 

           simvastatin 40 mg Tab RxNorm: 141197 1 Tablet(s) PO QD 2010 

Inactive                                 

 

             Tessalon Perles 100 mg Cap RxNorm: 382663 1 Capsule(s) PO Q6-8H 2010                Inactive                   

 

           cefdinir 300 mg Cap RxNorm: 851986 1 Capsule(s) PO BID 2010 

Inactive                                 

 

           Lexapro 10 mg Tab RxNorm: 497977 1 Tablet(s) PO QD 2010

010 

Inactive                                 

 

           Cipro 250 mg Tab RxNorm: 510957 1 Tablet(s) PO BID 2010 10/04/2

010 

Inactive                                 

 

             Wellbutrin  mg 24 hr Tab RxNorm: 856734 1 Tablet(s) PO QAM 2010                Inactive                   

 

          Fish Oil 1,000 mg Cap RxNorm:   1 Capsule(s) PO QD No Start Date      

     Active     

 

                Tylenol Extra Strength 500 mg tablet RxNorm: 996848  1/2 Tablet(

s) PO as needed 

No Start Date                           Active               

 

                nitroglycerin 0.4 mg sublingual tablet RxNorm: 303058  Tablet(s)

 SL as needed No 

Start Date                              Active               

 

                    Calcium with Vitamin D 600 mg (1,500 mg)-400 unit tablet RxN

orm: 675176      1 

Tablet(s) PO QD No Start Date                   Active           

 

           Multivitamin & Mineral Formula Tab RxNorm:    1 Tablet(s) PO QD No St

art Date            

Active                                   

 

          vitamin B complex capsule RxNorm:   1 Capsule(s) PO QD No Start Date  

         Active     

 

          Aspirin 81 mg Tab RxNorm: 080010 1 Tablet(s) PO QD No Start Date      

     Active     

 

                    isosorbide mononitrate ER 30 mg tablet,extended release 24 h

r RxNorm: 476742      1 

Tablet(s) PO QHS No Start Date                   Active           

 

           Vitamin D 1,000 unit Cap RxNorm: 459934 1 Capsule(s) PO QD No Start D

ate            

Active                                   

 

          Co Q-10 oral RxNorm: 57639 oral      No Start Date           Active   

  

 

             metoprolol tartrate 25 mg Tab RxNorm: 090791 1/2 Tablet(s) PO QD No

 Start Date 

10/23/2011                Inactive                   

 

             Nasonex 50 mcg/actuation Spray RxNorm: 3516867 2 Spray NASAL No Sta

rt Date 

2018                Inactive                   

 

           Vitamin B12 1000mcg Tablet RxNorm:    1 Tablet(s) PO QD No Start Date

 2015 

Inactive                                 

 

           amlodipine 5 mg Tab RxNorm: 553882 1 Tablet(s) PO QD No Start Date  

Inactive                                 

 

             simvastatin 40 mg tablet RxNorm: 537392 1 Tablet(s) PO QD No Start 

Date 

2019                Inactive                   

 

                omeprazole 20 mg capsule,delayed release RxNorm: 740977  1 Capsu

le(s) PO BID No 

Start Date          2013          Inactive             

 

                omeprazole 40 mg capsule,delayed release RxNorm: 940109  1 Capsu

le(s) PO QD No 

Start Date          2019          Inactive             

 

           Nexium 40 mg Cap RxNorm: 496843 1 Capsule(s) PO QD No Start Date  

Inactive                                 

 

             Stool Softener 100 mg Tab RxNorm: 1307091 2 Tablet(s) PO BID No Sta

rt Date 

2012                Inactive                   

 

                    Calcium with Vitamin D 600 mg (1,500 mg)-400 unit Tab RxNorm

: 423873      1 Tablet(s)

PO QD           No Start Date   2015      Inactive         

 

             iron 325 mg (65 mg iron) Tab RxNorm: 316583 1 Tablet(s) PO QD No St

art Date 

2015                Inactive                   

 

                Flonase 50 mcg/actuation Nasal Spray RxNorm: 536760  2 [facility] ROZ

AL BID No Start 

Date                2014          Inactive             

 

                    fluticasone 50 mcg/actuation nasal spray,suspension RxNorm: 

7556263     2 Spray 

NASAL QD to each nostril No Start Date   2018      Inactive         

 

             Nasonex 50 mcg/actuation Spray RxNorm: 9339386 2 Spray NASAL QD No 

Start Date 

2018                Inactive                   

 

                    hydralazine 50 mg tablet RxNorm: 137125      1 Tablet(s) PO 

as needed for BP over 

160/90          No Start Date   2018      Inactive         

 

             Vimovo 500 mg-20 mg 12 hr Tab RxNorm: 592837 1 Tablet(s) PO BID No 

Start Date 

2011                Inactive                   

 

             Tylenol PM 25 mg-500 mg/15 mL Oral Soln RxNorm: 5212656 1 PO QPM   

  No Start Date 

2015                Inactive                   

 

          Iron (Ferrous Sulfate) Oral RxNorm:   Oral      No Start Date 20

12 Inactive   

 

             Reglan 10 mg Tab RxNorm: 335386 1 Tablet(s) PO TID before meals No 

Start Date 

2011                Inactive                   

 

           Xanax 1 mg Tab RxNorm: 391135 1/2 Tablet(s) PO QD No Start Date  

Inactive                                 

 

             amlodipine 10 mg tablet RxNorm: 803604 1 Tablet(s) PO QD No Start D

ate 

2014                Inactive                   

 

          Iron (dried) Oral RxNorm:   Oral      No Start Date 2012 Inactiv

e   

 

                metoprolol tartrate 50 mg tablet RxNorm: 288129  1 Tablet(s) PO 

BID No Start Date

                    12/10/2018          Inactive             

 

           Xanax 0.25 mg tablet RxNorm: 802809 1 Tablet(s) PO BID No Start Date 

10/29/2018 

Inactive                                 

 

                lancets         RxNorm:         Miscellaneous check blood sugar 

at least once daily No Start 

Date                2013          Inactive             

 

           simvastatin 40 mg Tab RxNorm: 609256 1 Tablet(s) PO QD No Start Date 

2010 

Inactive                                 

 

                    isosorbide mononitrate ER 30 mg tablet,extended release 24 h

r RxNorm: 853281      1 

Tablet(s) PO QHS No Start Date   2019      Inactive         

 

             amlodipine 2.5 mg tablet RxNorm: 415446 1 Tablet(s) PO QD No Start 

Date 

2014                Inactive                   

 

             sucralfate 1 gram tablet RxNorm: 898845 1 Tablet(s) PO QID No Start

 Date 

2019                Inactive                   

 

          Fish Oil Oral RxNorm:   Oral      No Start Date 2012 Inactive   

 

          Multiple Vitamin Oral RxNorm:   Oral      No Start Date 2012 [facility]

ctive   

 

                metoprolol tartrate 25 mg tablet RxNorm: 368416  1/2 Tablet(s) P

O QD No Start 

Date                2017          Inactive             

 

                metoprolol tartrate 50 mg tablet RxNorm: 227516  1/2 Tablet(s) P

O BID No Start 

Date                2019          Inactive             

 

                    Flonase Allergy Relief 50 mcg/actuation nasal spray,suspensi

on RxNorm: 8543377     2

[facility] NASAL QHS No Start Date   2019      Inactive         







Medication Administered

No Medication Administered data



Immunizations





                Vaccine         Codes           Date            Status

 

                Influenza       CVX: 135        10/22/2019      Complete

 

                Influenza       CVX: 135        10/22/2019      Complete

 

                Influenza       CVX: 135        2018      Complete

 

                Influenza       CVX: 135        10/06/2017      Complete

 

                Influenza       CVX: 135        10/07/2016      Complete

 

                Influenza       CVX: 135        10/16/2015       

 

                Influenza       CVX: 141        2013       

 

                Influenza (Adult) CVX: 141        10/06/2010       







Results





          Observation Observation Code Item      Item Code Result    Date      S

ervice Location

 

          COMPLETE BLOOD COUNT [id number]   WBC                 7.1 10e9/L 20

20 Unknown

 

          COMPLETE BLOOD COUNT 4530116   RBC                 4.16 10e12/L 2020 Unknown

 

          COMPLETE BLOOD COUNT [id number]   HEMOGLOBIN           12.4 g/dL 20

20 Unknown

 

          COMPLETE BLOOD COUNT [id number]   HEMATOCRIT           39.3 %    20

20 Unknown

 

          COMPLETE BLOOD COUNT 1779940   MCV                 94.5 fL   

0 Unknown

 

          COMPLETE BLOOD COUNT [id number]   MCH                 29.8 pg   

0 Unknown

 

          COMPLETE BLOOD COUNT 3300844   MCHC                31.6 g/dL 

0 Unknown

 

          COMPLETE BLOOD COUNT 7202652   PLATELET COUNT           161 10e9/L 2020 Unknown

 

          COMPLETE BLOOD COUNT 9337321   Mean Plt Volume           10.8 fL   2020 Unknown

 

          COMPLETE BLOOD COUNT [id number]   Neut Auto           38.7 %    

0 Unknown

 

          COMPLETE BLOOD COUNT [id number]   Lymph Auto           48.0 %    20

20 Unknown

 

          COMPLETE BLOOD COUNT [id number]   Mono Auto           9.5 %     

0 Unknown

 

          COMPLETE BLOOD COUNT [id number]   RDW                 13.5 %    

0 Unknown

 

          COMPLETE BLOOD COUNT [id number]   Eos Auto            3.2 %     

0 Unknown

 

          COMPLETE BLOOD COUNT [id number]   Baso Auto           0.6 %     

0 Unknown

 

          COMPLETE BLOOD COUNT [id number]   Neutrophil Abs           2.75 10e9/L  Unknown

 

          COMPLETE BLOOD COUNT [id number]   Lymphocyte Abs           3.41 10e9/L  Unknown

 

          COMPLETE BLOOD COUNT [id number]   Monocyte Abs           0.67 10e9/L 2020 Unknown

 

          COMPLETE BLOOD COUNT [id number]   Eosinophil Abs           0.23 10e9/L  Unknown

 

          COMPLETE BLOOD COUNT [id number]   RDW-SD              44.7 fL   

0 Unknown

 

          COMPLETE BLOOD COUNT [id number]   Basophil Abs           0.04 10e9/L 2020 Unknown

 

          GFR CALC  1622855   GFR Non Afr Amr           52 mL/min 10/11/2019 Unk

nown

 

          GFR CALC  8368547   GFR Afr Amr           >60 mL/min 10/11/2019 Unknow

n

 

          COMPREHENSIVE METABOLIC 84967     AST                 17 U/L    10/11/

2019 Unknown

 

          COMPREHENSIVE METABOLIC 59822     ALT                 11 U/L    10/11/

2019 Unknown

 

          COMPREHENSIVE METABOLIC 31444     BUN                 17 mg/dL  10/11/

2019 Unknown

 

          COMPREHENSIVE METABOLIC 81211     ALBUMIN             3.9 g/dL  10/11/

2019 Unknown

 

          COMPREHENSIVE METABOLIC 79518     CHLORIDE            108 mmol/L 10/11

/2019 Unknown

 

          COMPREHENSIVE METABOLIC 77653     Bili Total           0.5 mg/dL 10/11

/2019 Unknown

 

          COMPREHENSIVE METABOLIC 74697     ALK PHOS            72 U/L    10/11/

2019 Unknown

 

          COMPREHENSIVE METABOLIC 65139     SODIUM              142 mmol/L 10/11

/2019 Unknown

 

          COMPREHENSIVE METABOLIC 67265     CREATININE           1.02 mg/dL 10/1

2019 Unknown

 

          COMPREHENSIVE METABOLIC 71827     CALCIUM             9.3 mg/dL 10/11/

2019 Unknown

 

          COMPREHENSIVE METABOLIC 55083     POTASSIUM           4.0 mmol/L 10/11

/2019 Unknown

 

          COMPREHENSIVE METABOLIC 93346     Total Protein           6.2 g/dL  10

/2019 Unknown

 

          COMPREHENSIVE METABOLIC 30392     Glucose             93 mg/dL  10/11/

2019 Unknown

 

          COMPREHENSIVE METABOLIC 86531     Bicarbonate           27 mmol/L 10/1

2019 Unknown

 

          COMPREHENSIVE METABOLIC 42280     AGAP                7 mmol/L  10/11/

2019 Unknown

 

          GFR CALC  2452357   GFR Non Afr Amr           48 mL/min 06/10/2019 Unk

nown

 

          GFR CALC  2054380   GFR Afr Amr           59 mL/min 06/10/2019 Unknown

 

          THYROID STIMULATING HORMONE 94929     TSH                 1.936 uIU/mL

 06/10/2019 Unknown

 

          COMPREHENSIVE METABOLIC 61885     AST                 18 U/L    06/10/

2019 Unknown

 

          COMPREHENSIVE METABOLIC 14258     ALT                 11 U/L    06/10/

2019 Unknown

 

          COMPREHENSIVE METABOLIC 95790     BUN                 18 mg/dL  06/10/

2019 Unknown

 

          COMPREHENSIVE METABOLIC 17207     ALBUMIN             4.2 g/dL  06/10/

2019 Unknown

 

          COMPREHENSIVE METABOLIC 26628     CHLORIDE            109 mmol/L 06/10

/2019 Unknown

 

          COMPREHENSIVE METABOLIC 76248     Bili Total           0.4 mg/dL 06/10

/2019 Unknown

 

          COMPREHENSIVE METABOLIC 77559     ALK PHOS            64 U/L    06/10/

2019 Unknown

 

          COMPREHENSIVE METABOLIC 37303     SODIUM              142 mmol/L 06/10

/2019 Unknown

 

          COMPREHENSIVE METABOLIC 91273     CREATININE           1.09 mg/dL  Unknown

 

          COMPREHENSIVE METABOLIC 25274     CALCIUM             9.3 mg/dL 06/10/

2019 Unknown

 

          COMPREHENSIVE METABOLIC 89982     POTASSIUM           4.7 mmol/L 06/10

/2019 Unknown

 

          COMPREHENSIVE METABOLIC 67153     Total Protein           6.3 g/dL  06

/10/2019 Unknown

 

          COMPREHENSIVE METABOLIC 43308     Glucose             84 mg/dL  06/10/

2019 Unknown

 

          COMPREHENSIVE METABOLIC 30123     Bicarbonate           25 mmol/L  Unknown

 

          COMPREHENSIVE METABOLIC 32398     AGAP                8 mmol/L  06/10/

2019 Unknown

 

          COMPLETE BLOOD COUNT [id number]   WBC                 7.8 10e9/L 06/10/20

19 Unknown

 

          COMPLETE BLOOD COUNT [id number]   RBC                 4.04 10e12/L 06/10/

2019 Unknown

 

          COMPLETE BLOOD COUNT [id number]   HEMOGLOBIN           12.2 g/dL 06/10/20

19 Unknown

 

          COMPLETE BLOOD COUNT [id number]   HEMATOCRIT           38.6 %    06/10/20

19 Unknown

 

          COMPLETE BLOOD COUNT [id number]   MCV                 95.5 fL   06/10/201

9 Unknown

 

          COMPLETE BLOOD COUNT [id number]   MCH                 30.2 pg   06/10/201

9 Unknown

 

          COMPLETE BLOOD COUNT [id number]   MCHC                31.6 g/dL 06/10/201

9 Unknown

 

          COMPLETE BLOOD COUNT [id number]   PLATELET COUNT           215 10e9/L 06/

10/2019 Unknown

 

          COMPLETE BLOOD COUNT [id number]   Mean Plt Volume           11.2 fL   06/

10/2019 Unknown

 

          COMPLETE BLOOD COUNT [id number]   Neut Auto           45.7 %    06/10/201

9 Unknown

 

          COMPLETE BLOOD COUNT [id number]   Lymph Auto           42.1 %    06/10/20

19 Unknown

 

          COMPLETE BLOOD COUNT [id number]   Mono Auto           9.2 %     06/10/201

9 Unknown

 

          COMPLETE BLOOD COUNT [id number]   RDW                 13.4 %    06/10/201

9 Unknown

 

          COMPLETE BLOOD COUNT [id number]   Eos Auto            2.7 %     06/10/201

9 Unknown

 

          COMPLETE BLOOD COUNT [id number]   Baso Auto           0.3 %     06/10/201

9 Unknown

 

          COMPLETE BLOOD COUNT [id number]   Neutrophil Abs           3.56 10e9/L 06

/10/2019 Unknown

 

          COMPLETE BLOOD COUNT [id number]   Lymphocyte Abs           3.28 10e9/L 06

/10/2019 Unknown

 

          COMPLETE BLOOD COUNT [id number]   Monocyte Abs           0.72 10e9/L  Unknown

 

          COMPLETE BLOOD COUNT [id number]   Eosinophil Abs           0.21 10e9/L 06

/10/2019 Unknown

 

          COMPLETE BLOOD COUNT [id number]   RDW-SD              44.9 fL   06/10/201

9 Unknown

 

          COMPLETE BLOOD COUNT [id number]   Basophil Abs           0.02 10e9/L  Unknown

 

          COMPLETE BLOOD COUNT [id number]   WBC                 5.2 10e9/L 20

18 Unknown

 

          COMPLETE BLOOD COUNT [id number]   RBC                 4.21 10e12/L 2018 Unknown

 

          COMPLETE BLOOD COUNT [id number]   HEMOGLOBIN           12.8 g/dL 20

18 Unknown

 

          COMPLETE BLOOD COUNT [id number]   HEMATOCRIT           39.0 %    20

18 Unknown

 

          COMPLETE BLOOD COUNT [id number]   MCV                 92.6 fL   

8 Unknown

 

          COMPLETE BLOOD COUNT [id number]   MCH                 30.4 pg   

8 Unknown

 

          COMPLETE BLOOD COUNT [id number]   MCHC                32.8 g/dL 

8 Unknown

 

          COMPLETE BLOOD COUNT [id number]   PLATELET COUNT           202 10e9/L  Unknown

 

          COMPLETE BLOOD COUNT [id number]   Mean Plt Volume           10.7 fL    Unknown

 

          COMPLETE BLOOD COUNT [id number]   Neut Auto           40.9 %    

8 Unknown

 

          COMPLETE BLOOD COUNT [id number]   Lymph Auto           46.3 %    20

18 Unknown

 

          COMPLETE BLOOD COUNT [id number]   Mono Auto           9.3 %     

8 Unknown

 

          COMPLETE BLOOD COUNT [id number]   RDW                 13.7 %    

8 Unknown

 

          COMPLETE BLOOD COUNT [id number]   Eos Auto            2.9 %     

8 Unknown

 

          COMPLETE BLOOD COUNT [id number]   Baso Auto           0.6 %     

8 Unknown

 

          COMPLETE BLOOD COUNT [id number]   Neutrophil Abs           2.13 10e9/L  Unknown

 

          COMPLETE BLOOD COUNT [id number]   Lymphocyte Abs           2.41 10e9/L  Unknown

 

          COMPLETE BLOOD COUNT [id number]   Monocyte Abs           0.48 10e9/L  Unknown

 

          COMPLETE BLOOD COUNT [id number]   Eosinophil Abs           0.15 10e9/L  Unknown

 

          COMPLETE BLOOD COUNT [id number]   RDW-SD              45.2 fL   

8 Unknown

 

          COMPLETE BLOOD COUNT [id number]   Basophil Abs           0.03 10e9/L  Unknown

 

          [place] [address]     Glucose             118 mg/dL  Unknown

 

          [place] [address]     CREATININE           1.01 mg/dL  Unknown

 

          [place] [address]     BUN                 14 mg/dL   Unknown

 

          [place] [address]     SODIUM              141 mmol/L  Unknown

 

          [place] [address]     POTASSIUM           4.0 mmol/L  Unknown

 

          [place] [address]     CHLORIDE            108 mmol/L  Unknown

 

          [place] [address]     Bicarbonate           25 mmol/L  Unknown

 

          [place] [address]     AGAP                8 mmol/L   Unknown

 

          [place] [address]     CALCIUM             9.6 mg/dL  Unknown

 

          FREE T4   07044     T4 Free             1.23 ng/dL 2018 Unknown

 

          GFR CALC  1010382   GFR Non Afr Amr           53 mL/min 2018 Unk

nown

 

          GFR CALC  1148587   GFR Afr Amr           >60 mL/min 2018 Unknow

n

 

          THYROID STIMULATING HORMONE 08321     TSH                 2.124 uIU/mL

 2018 Unknown

 

          LIPID GROUP 73471     Cholesterol           152 mg/dL 2018 Unkno

wn

 

          LIPID GROUP 82702     Triglyceride           151 mg/dL 2018 Unkn

own

 

          LIPID GROUP 73763     HDL CHOLESTEROL           47 mg/dL  2018 U

nknown

 

          LIPID GROUP 35561     Chol/HDL Ratio           3.23 ratio 2018 U

nknown

 

          LIPID GROUP 19017     NON-HDL Chol           105 mg/dL 2018 Unkn

own

 

          LIPID GROUP 28494     LDL Cholesterol           75 mg/dL  2018 U

nknown

 

          ASSAY OF TROPONIN QUANT 79875     Troponin-I           <0.30 ng/mL  Unknown

 

          COMPREHENSIVE METABOLIC 57475     AST                 20 U/L    2018 Unknown

 

          COMPREHENSIVE METABOLIC 74973     ALT                 14 U/L    2018 Unknown

 

          COMPREHENSIVE METABOLIC 88533     BUN                 19 mg/dL  2018 Unknown

 

          COMPREHENSIVE METABOLIC 94273     ALBUMIN             4.2 g/dL  2018 Unknown

 

          COMPREHENSIVE METABOLIC 58346     CHLORIDE            102 mmol/L  Unknown

 

          COMPREHENSIVE METABOLIC 87158     Bili Total           0.4 mg/dL  Unknown

 

          COMPREHENSIVE METABOLIC 45500     ALK PHOS            66 U/L    2018 Unknown

 

          COMPREHENSIVE METABOLIC 51372     SODIUM              135 mmol/L  Unknown

 

          COMPREHENSIVE METABOLIC 66519     CREATININE           1.01 mg/dL  Unknown

 

          COMPREHENSIVE METABOLIC 72615     CALCIUM             9.3 mg/dL 2018 Unknown

 

          COMPREHENSIVE METABOLIC 39055     POTASSIUM           4.8 mmol/L  Unknown

 

          COMPREHENSIVE METABOLIC 51201     Total Protein           7.0 g/dL   Unknown

 

          COMPREHENSIVE METABOLIC 89634     Glucose             91 mg/dL  2018 Unknown

 

          COMPREHENSIVE METABOLIC 08898     Bicarbonate           23 mmol/L  Unknown

 

          COMPREHENSIVE METABOLIC 21762     AGAP                10 mmol/L 2018 Unknown

 

          COMPLETE BLOOD COUNT [id number]   WBC                 7.5 10e9/L 20

18 Unknown

 

          COMPLETE BLOOD COUNT [id number]   RBC                 4.13 10e12/L 2018 Unknown

 

          COMPLETE BLOOD COUNT 7102413   HEMOGLOBIN           12.6 g/dL 20

18 Unknown

 

          COMPLETE BLOOD COUNT [id number]   HEMATOCRIT           38.4 %    20

18 Unknown

 

          COMPLETE BLOOD COUNT [id number]   MCV                 93.0 fL   

8 Unknown

 

          COMPLETE BLOOD COUNT 9031331   MCH                 30.5 pg   

8 Unknown

 

          COMPLETE BLOOD COUNT [id number]   MCHC                32.8 g/dL 

8 Unknown

 

          COMPLETE BLOOD COUNT 4095125   PLATELET COUNT           204 10e9/L  Unknown

 

          COMPLETE BLOOD COUNT 4999048   Mean Plt Volume           10.9 fL    Unknown

 

          COMPLETE BLOOD COUNT [id number]   Neut Auto           43.1 %    

8 Unknown

 

          COMPLETE BLOOD COUNT [id number]   Lymph Auto           45.0 %    20

18 Unknown

 

          COMPLETE BLOOD COUNT [id number]   Mono Auto           9.2 %     

8 Unknown

 

          COMPLETE BLOOD COUNT [id number]   RDW                 13.4 %    

8 Unknown

 

          COMPLETE BLOOD COUNT [id number]   Eos Auto            2.3 %     

8 Unknown

 

          COMPLETE BLOOD COUNT [id number]   Baso Auto           0.4 %     

8 Unknown

 

          COMPLETE BLOOD COUNT [id number]   Neutrophil Abs           3.23 10e9/L  Unknown

 

          COMPLETE BLOOD COUNT 1379903   Lymphocyte Abs           3.38 10e9/L  Unknown

 

          COMPLETE BLOOD COUNT [id number]   Monocyte Abs           0.69 10e9/L  Unknown

 

          COMPLETE BLOOD COUNT [id number]   Eosinophil Abs           0.17 10e9/L  Unknown

 

          COMPLETE BLOOD COUNT [id number]   RDW-SD              44.4 fL   

8 Unknown

 

          COMPLETE BLOOD COUNT [id number]   Basophil Abs           0.03 10e9/L  Unknown

 

          GFR CALC  7013137   GFR Non Afr Amr           53 mL/min 2018 Unk

nown

 

          GFR CALC  6780704   GFR Afr Amr           >60 mL/min 2018 Unknow

n

 

          GLYCOSYLATED HEMOGLOBIN TEST 59204     Hgb A1c   54844-3   5.4 %     0

2018 Unknown

 

          MEAN GLUC 5601445   Calc Mean Gluc           108 mg/dL 2018 Unkn

own

 

          MEAN GLUC 3759243   Calc Mean Gluc           114 mg/dL 2017 Unkn

own

 

          LIPID GROUP 57367     Cholesterol           146 mg/dL 2017 Unkno

wn

 

          LIPID GROUP 12108     Triglyceride           119 mg/dL 2017 Unkn

own

 

          LIPID GROUP 12770     HDL CHOLESTEROL           47 mg/dL  2017 U

nknown

 

          LIPID GROUP 44197     Chol/HDL Ratio           3.11 ratio 2017 U

nknown

 

          LIPID GROUP 76652     NON-HDL Chol           99 mg/dL  2017 Unkn

own

 

          LIPID GROUP 50900     LDL Cholesterol           75 mg/dL  2017 U

nknown

 

          GLYCOSYLATED HEMOGLOBIN TEST 33552     Hgb A1c   95333-0   5.6 %     0

2017 Unknown

 

          COMPREHENSIVE METABOLIC 25260     AST                 22 U/L    2017 Unknown

 

          COMPREHENSIVE METABOLIC 27856     ALT                 12 U/L    2017 Unknown

 

          COMPREHENSIVE METABOLIC 48421     BUN                 17 mg/dL  2017 Unknown

 

          COMPREHENSIVE METABOLIC 11997     ALBUMIN             4.0 g/dL  2017 Unknown

 

          COMPREHENSIVE METABOLIC 69846     CHLORIDE            110 mmol/L  Unknown

 

          COMPREHENSIVE METABOLIC 45695     Bili Total           0.4 mg/dL  Unknown

 

          COMPREHENSIVE METABOLIC 13100     ALK PHOS            63 U/L    2017 Unknown

 

          COMPREHENSIVE METABOLIC 40953     SODIUM              140 mmol/L  Unknown

 

          COMPREHENSIVE METABOLIC 10296     CREATININE           1.05 mg/dL  Unknown

 

          COMPREHENSIVE METABOLIC 02848     CALCIUM             9.2 mg/dL 2017 Unknown

 

          COMPREHENSIVE METABOLIC 09383     POTASSIUM           4.2 mmol/L  Unknown

 

          COMPREHENSIVE METABOLIC 30789     Total Protein           6.2 g/dL   Unknown

 

          COMPREHENSIVE METABOLIC 29448     Glucose             87 mg/dL  2017 Unknown

 

          COMPREHENSIVE METABOLIC 21094     Bicarbonate           24 mmol/L  Unknown

 

          COMPREHENSIVE METABOLIC 65939     AGAP                6 mmol/L  2017 Unknown

 

          GFR CALC  5437346   GFR Non Afr Amr           51 mL/min 2017 Unk

nown

 

          GFR CALC  4564440   GFR Afr Amr           >60 mL/min 2017 Unknow

n

 

          COMPLETE BLOOD COUNT [id number]   WBC                 6.7 10e9/L 20

17 Unknown

 

          COMPLETE BLOOD COUNT [id number]   RBC                 4.04 10e12/L 2017 Unknown

 

          COMPLETE BLOOD COUNT 2010295   HEMOGLOBIN           12.1 g/dL 20

17 Unknown

 

          COMPLETE BLOOD COUNT [id number]   HEMATOCRIT           38.0 %    20

17 Unknown

 

          COMPLETE BLOOD COUNT [id number]   MCV                 94.1 fL   

7 Unknown

 

          COMPLETE BLOOD COUNT [id number]   MCH                 30.0 pg   

7 Unknown

 

          COMPLETE BLOOD COUNT [id number]   MCHC                31.8 g/dL 

7 Unknown

 

          COMPLETE BLOOD COUNT [id number]   PLATELET COUNT           206 10e9/L  Unknown

 

          COMPLETE BLOOD COUNT [id number]   Mean Plt Volume           11.3 fL    Unknown

 

          COMPLETE BLOOD COUNT [id number]   Neut Auto           35.8 %    

7 Unknown

 

          COMPLETE BLOOD COUNT [id number]   Lymph Auto           51.6 %    20

17 Unknown

 

          COMPLETE BLOOD COUNT [id number]   Mono Auto           8.8 %     

7 Unknown

 

          COMPLETE BLOOD COUNT [id number]   RDW                 13.5 %    

7 Unknown

 

          COMPLETE BLOOD COUNT [id number]   Eos Auto            3.4 %     

7 Unknown

 

          COMPLETE BLOOD COUNT [id number]   Baso Auto           0.4 %     

7 Unknown

 

          COMPLETE BLOOD COUNT [id number]   Neutrophil Abs           2.40 10e9/L  Unknown

 

          COMPLETE BLOOD COUNT [id number]   Lymphocyte Abs           3.46 10e9/L  Unknown

 

          COMPLETE BLOOD COUNT [id number]   Monocyte Abs           0.59 10e9/L  Unknown

 

          COMPLETE BLOOD COUNT [id number]   Eosinophil Abs           0.23 10e9/L  Unknown

 

          COMPLETE BLOOD COUNT [id number]   RDW-SD              45.3 fL   

7 Unknown

 

          COMPLETE BLOOD COUNT [id number]   Basophil Abs           0.03 10e9/L  Unknown

 

          THYROID STIMULATING HORMONE 79064     TSH                 1.981 uIU/mL

 2017 Unknown

 

          COMPLETE BLOOD COUNT [id number]   WBC                 6.0 10e9/L 20

17 Unknown

 

          COMPLETE BLOOD COUNT [id number]   RBC                 4.29 10e12/L 2017 Unknown

 

          COMPLETE BLOOD COUNT [id number]   HEMOGLOBIN           12.9 g/dL 20

17 Unknown

 

          COMPLETE BLOOD COUNT [id number]   HEMATOCRIT           38.4 %    20

17 Unknown

 

          COMPLETE BLOOD COUNT [id number]   MCV                 89.5 fL   

7 Unknown

 

          COMPLETE BLOOD COUNT [id number]   MCH                 30.1 pg   

7 Unknown

 

          COMPLETE BLOOD COUNT [id number]   MCHC                33.6 g/dL 

7 Unknown

 

          COMPLETE BLOOD COUNT [id number]   PLATELET COUNT           181 10e9/L 2017 Unknown

 

          COMPLETE BLOOD COUNT [id number]   Mean Plt Volume           11.7 fL   2017 Unknown

 

          COMPLETE BLOOD COUNT [id number]   Neut Auto           36.9 %    

7 Unknown

 

          COMPLETE BLOOD COUNT [id number]   Lymph Auto           50.4 %    20

17 Unknown

 

          COMPLETE BLOOD COUNT [id number]   Mono Auto           9.0 %     

7 Unknown

 

          COMPLETE BLOOD COUNT [id number]   RDW                 13.7 %    

7 Unknown

 

          COMPLETE BLOOD COUNT [id number]   Eos Auto            3.4 %     

7 Unknown

 

          COMPLETE BLOOD COUNT [id number]   Baso Auto           0.3 %     

7 Unknown

 

          COMPLETE BLOOD COUNT [id number]   Neutrophil Abs           2.21 10e9/L  Unknown

 

          COMPLETE BLOOD COUNT [id number]   Lymphocyte Abs           3.02 10e9/L  Unknown

 

          COMPLETE BLOOD COUNT [id number]   Monocyte Abs           0.54 10e9/L 2017 Unknown

 

          COMPLETE BLOOD COUNT [id number]   Eosinophil Abs           0.20 10e9/L  Unknown

 

          COMPLETE BLOOD COUNT [id number]   RDW-SD              44.0 fL   

7 Unknown

 

          COMPLETE BLOOD COUNT [id number]   Basophil Abs           0.02 10e9/L 2017 Unknown

 

          GLYCOSYLATED HEMOGLOBIN TEST 87816     Hgb A1c   58626-1   5.4 %     0

3/09/2017 Unknown

 

          THYROID STIMULATING HORMONE 23802     TSH                 2.200 uIU/mL

 2017 Unknown

 

          GFR CALC  4880304   GFR Non Afr Amr           50 mL/min 2017 Unk

nown

 

          GFR CALC  4128581   GFR Afr Amr           >60 mL/min 2017 Unknow

n

 

          MEAN GLUC 2690176   Calc Mean Gluc           108 mg/dL 2017 Unkn

own

 

          COMPREHENSIVE METABOLIC 01111     AST                 18 U/L    2017 Unknown

 

          COMPREHENSIVE METABOLIC 73727     ALT                 10 U/L    2017 Unknown

 

          COMPREHENSIVE METABOLIC 93912     BUN                 20 mg/dL  2017 Unknown

 

          COMPREHENSIVE METABOLIC 63203     ALBUMIN             4.1 g/dL  2017 Unknown

 

          COMPREHENSIVE METABOLIC 08137     CHLORIDE            109 mmol/L  Unknown

 

          COMPREHENSIVE METABOLIC 18133     Bili Total           0.6 mg/dL  Unknown

 

          COMPREHENSIVE METABOLIC 87737     ALK PHOS            64 U/L    2017 Unknown

 

          COMPREHENSIVE METABOLIC 95314     SODIUM              141 mmol/L  Unknown

 

          COMPREHENSIVE METABOLIC 20222     CREATININE           1.06 mg/dL 2017 Unknown

 

          COMPREHENSIVE METABOLIC 13626     CALCIUM             9.9 mg/dL 2017 Unknown

 

          COMPREHENSIVE METABOLIC 99164     POTASSIUM           4.2 mmol/L  Unknown

 

          COMPREHENSIVE METABOLIC 18241     Total Protein           6.3 g/dL   Unknown

 

          COMPREHENSIVE METABOLIC 68051     Glucose             99 mg/dL  2017 Unknown

 

          COMPREHENSIVE METABOLIC 72019     Bicarbonate           21 mmol/L 2017 Unknown

 

          COMPREHENSIVE METABOLIC 54064     AGAP                11 mmol/L 2017 Unknown

 

          LIPID GROUP [id number]     Cholesterol           169 mg/dL 2016 Unkno

wn

 

          LIPID GROUP [id number]     Triglyceride           165 mg/dL 2016 Unkn

own

 

          LIPID GROUP [id number]     HDL CHOLESTEROL           43 mg/dL  2016 U

nknown

 

          LIPID GROUP [id number]     Chol/HDL Ratio           3.93 ratio 2016 U

nknown

 

          LIPID GROUP [id number]     NON-HDL Chol           126 mg/dL 2016 Unkn

own

 

          LIPID GROUP [id number]     LDL Cholesterol           93 mg/dL  2016 U

nknown

 

          COMPREHENSIVE METABOLIC 45149     AST                 18 U/L    2016 Unknown

 

          COMPREHENSIVE METABOLIC 11075     ALT                 10 U/L    2016 Unknown

 

          COMPREHENSIVE METABOLIC 59045     BUN                 20 mg/dL  2016 Unknown

 

          COMPREHENSIVE METABOLIC 82411     ALBUMIN             3.9 g/dL  2016 Unknown

 

          COMPREHENSIVE METABOLIC 44276     CHLORIDE            110 mmol/L  Unknown

 

          COMPREHENSIVE METABOLIC 57325     Bili Total           0.5 mg/dL  Unknown

 

          COMPREHENSIVE METABOLIC 36296     ALK PHOS            72 U/L    2016 Unknown

 

          COMPREHENSIVE METABOLIC 90660     SODIUM              141 mmol/L  Unknown

 

          COMPREHENSIVE METABOLIC 95412     CREATININE           1.12 mg/dL  Unknown

 

          COMPREHENSIVE METABOLIC 04573     CALCIUM             9.7 mg/dL 2016 Unknown

 

          COMPREHENSIVE METABOLIC 50879     POTASSIUM           4.4 mmol/L  Unknown

 

          COMPREHENSIVE METABOLIC 16928     Total Protein           6.2 g/dL   Unknown

 

          COMPREHENSIVE METABOLIC 71287     Glucose             90 mg/dL  2016 Unknown

 

          COMPREHENSIVE METABOLIC 16939     Bicarbonate           23 mmol/L  Unknown

 

          COMPREHENSIVE METABOLIC 23726     AGAP                8 mmol/L  2016 Unknown

 

          GFR CALC  2366509   GFR Non Afr Amr           47 mL/min 2016 Unk

nown

 

          GFR CALC  2715521   GFR Afr Amr           57 mL/min 2016 Unknown

 

          GLYCOSYLATED HEMOGLOBIN TEST 89557     Hgb A1c   91654-6   5.5 %     0

2016 Unknown

 

          THYROID STIMULATING HORMONE 21651     TSH                 2.537 uIU/mL

 2016 Unknown

 

          FREE T4   49463     T4 Free             1.36 ng/dL 2016 Unknown

 

          COMPLETE BLOOD COUNT [id number]   WBC                 6.8 10e9/L 20

16 Unknown

 

          COMPLETE BLOOD COUNT [id number]   RBC                 4.20 10e12/L 2016 Unknown

 

          COMPLETE BLOOD COUNT 7364713   HEMOGLOBIN           12.5 g/dL 20

16 Unknown

 

          COMPLETE BLOOD COUNT 7098526   HEMATOCRIT           38.0 %    20

16 Unknown

 

          COMPLETE BLOOD COUNT 8432781   MCV                 90.5 fL   

6 Unknown

 

          COMPLETE BLOOD COUNT 7369390   MCH                 29.8 pg   

6 Unknown

 

          COMPLETE BLOOD COUNT 5035855   MCHC                32.9 g/dL 

6 Unknown

 

          COMPLETE BLOOD COUNT [id number]   PLATELET COUNT           197 10e9/L  Unknown

 

          COMPLETE BLOOD COUNT 7061160   Mean Plt Volume           11.7 fL    Unknown

 

          COMPLETE BLOOD COUNT [id number]   Neut Auto           41.3 %    

6 Unknown

 

          COMPLETE BLOOD COUNT [id number]   Lymph Auto           47.1 %    20

16 Unknown

 

          COMPLETE BLOOD COUNT 5345518   Mono Auto           7.8 %     

6 Unknown

 

          COMPLETE BLOOD COUNT [id number]   RDW                 13.8 %    

6 Unknown

 

          COMPLETE BLOOD COUNT 9135109   Eos Auto            3.4 %     

6 Unknown

 

          COMPLETE BLOOD COUNT [id number]   Baso Auto           0.4 %     

6 Unknown

 

          COMPLETE BLOOD COUNT [id number]   Neutrophil Abs           2.81 10e9/L  Unknown

 

          COMPLETE BLOOD COUNT 1185104   Lymphocyte Abs           3.20 10e9/L  Unknown

 

          COMPLETE BLOOD COUNT 1460860   Monocyte Abs           0.53 10e9/L  Unknown

 

          COMPLETE BLOOD COUNT [id number]   Eosinophil Abs           0.23 10e9/L  Unknown

 

          COMPLETE BLOOD COUNT 7521438   RDW-SD              44.4 fL   

6 Unknown

 

          COMPLETE BLOOD COUNT [id number]   Basophil Abs           0.03 10e9/L  Unknown

 

          MEAN GLUC 1314014   Calc Mean Gluc           111 mg/dL 2016 Unkn

own

 

          [place] [address]     Glucose             89 MG/DL   Unknown

 

          [place] [address]     CREATININE           1.12 MG/DL  Unknown

 

          [place] [address]     BUN                 20 MG/DL   Unknown

 

          [place] [address]     SODIUM              139 MMOL/L  Unknown

 

          [place] [address]     POTASSIUM           4.6 MMOL/L  Unknown

 

          [place] [address]     CHLORIDE            108 MMOL/L  Unknown

 

          [place] [address]     BICARB              26 MMOL/L  Unknown

 

          [place] [address]     ANION GAP           5 MEQ/L    Unknown

 

          [place] [address]     CALCIUM             10.0 MG/DL  Unknown

 

          GFR CALC  7944094   GFR AA              57.0L ML/MIN 2015 Unknow

n

 

          GFR CALC  2019628   GFR NON-AA           47.0L ML/MIN 2015 Unkno

wn

 

          THYROID STIMULATING HORMONE 71566     TSH                 2.378 uIU/ML

 09/10/2015 Unknown

 

          COMPLETE BLOOD COUNT 0183005   WBC                 6.4 10e9/L 09/10/20

15 Unknown

 

          COMPLETE BLOOD COUNT [id number]   RBC                 3.99 10e12/L 09/10/

2015 Unknown

 

          COMPLETE BLOOD COUNT 5855110   HGB                 11.9 g/dL 09/10/201

5 Unknown

 

          COMPLETE BLOOD COUNT 8655849   HCT DET             36.9 %    09/10/201

5 Unknown

 

          COMPLETE BLOOD COUNT [id number]   MCV                 92.5 fL   09/10/201

5 Unknown

 

          COMPLETE BLOOD COUNT [id number]   MCH                 29.8 pg   09/10/201

5 Unknown

 

          COMPLETE BLOOD COUNT [id number]   MCHC                32.2 g/dL 09/10/201

5 Unknown

 

          COMPLETE BLOOD COUNT [id number]   PLT                 172 10e9/L 09/10/20

15 Unknown

 

          COMPLETE BLOOD COUNT [id number]   MPV                 11.7 fL   09/10/201

5 Unknown

 

          COMPLETE BLOOD COUNT [id number]   ARIK %               40.4 %    09/10/201

5 Unknown

 

          COMPLETE BLOOD COUNT [id number]   LY %                48.0 %    09/10/201

5 Unknown

 

          COMPLETE BLOOD COUNT [id number]   MON %               8.3 %     09/10/201

5 Unknown

 

          COMPLETE BLOOD COUNT [id number]   EOS  %              2.8 %     09/10/201

5 Unknown

 

          COMPLETE BLOOD COUNT [id number]   BASO %              0.5 %     09/10/201

5 Unknown

 

          COMPLETE BLOOD COUNT [id number]   RDW                 13.6 %    09/10/201

5 Unknown

 

          COMPLETE BLOOD COUNT [id number]   ABS ARIK             2.59 10e9/L 09/10/2

015 Unknown

 

          COMPLETE BLOOD COUNT [id number]   ABS LYMPH           3.07 10e9/L 09/10/2

015 Unknown

 

          COMPLETE BLOOD COUNT [id number]   ABS MONO            0.53 10e9/L 09/10/2

015 Unknown

 

          COMPLETE BLOOD COUNT [id number]   ABS EOS             0.18 10e9/L 09/10/2

015 Unknown

 

          COMPLETE BLOOD COUNT [id number]   ABS BASO            0.03 10e9/L 09/10/2

015 Unknown

 

          COMPLETE BLOOD COUNT [id number]   RDW-SD              44.9 fL   09/10/201

5 Unknown

 

          LIPID GROUP [id number]     HDL TEST            42 MG/DL  09/10/2015 Unknown

 

          LIPID GROUP [id number]     TRIG                177 MG/DL 09/10/2015 Unknown

 

          LIPID GROUP [id number]     TEST LDL            72 MG/DL  09/10/2015 Unknown

 

          LIPID GROUP [id number]     CHOL                149 MG/DL 09/10/2015 Unknown

 

          LIPID GROUP [id number]     RCHOL/HDL           3.55 RATIO 09/10/2015 Unknow

n

 

          LIPID GROUP [id number]     NON-HDL CH           107 MG/DL 09/10/2015 Unknow

n

 

          GLYCOSYLATED HEMOGLOBIN TEST 13528     A1C HPLC  30879-0   5.5 %     0

9/10/2015 Unknown

 

          FREE T4   63831     FREE T4             1.39 NG/DL 09/10/2015 Unknown

 

          GFR CALC  4600871   GFR AA              55.0L ML/MIN 09/10/2015 Unknow

n

 

          GFR CALC  9055953   GFR NON-AA           46.0L ML/MIN 09/10/2015 Unkno

wn

 

          COMPREHENSIVE METABOLIC 95392     AST                 17 U/L    09/10/

2015 Unknown

 

          COMPREHENSIVE METABOLIC 76430     ALT                 10 IU/L   09/10/

2015 Unknown

 

          COMPREHENSIVE METABOLIC 18711     BUN                 20 MG/DL  09/10/

2015 Unknown

 

          COMPREHENSIVE METABOLIC 03586     ALBUMIN             3.9 GM/DL 09/10/

2015 Unknown

 

          COMPREHENSIVE METABOLIC 91067     CHLORIDE            111 MMOL/L 09/10

/2015 Unknown

 

          COMPREHENSIVE METABOLIC 84669     BILI TOT            0.4 MG/DL 09/10/

2015 Unknown

 

          COMPREHENSIVE METABOLIC 55570     ALK PHOS            70 U/L    09/10/

2015 Unknown

 

          COMPREHENSIVE METABOLIC 10739     SODIUM              142 MMOL/L 09/10

/2015 Unknown

 

          COMPREHENSIVE METABOLIC 53702     CREATININE           1.16 MG/DL  Unknown

 

          COMPREHENSIVE METABOLIC 44509     CALCIUM             9.4 MG/DL 09/10/

2015 Unknown

 

          COMPREHENSIVE METABOLIC 81671     POTASSIUM           4.6 MMOL/L 09/10

/2015 Unknown

 

          COMPREHENSIVE METABOLIC 76092     PROT TOT            6.2 GM/DL 09/10/

2015 Unknown

 

          COMPREHENSIVE METABOLIC 93003     Glucose             90 MG/DL  09/10/

2015 Unknown

 

          COMPREHENSIVE METABOLIC 24166     BICARB              24 MMOL/L 09/10/

2015 Unknown

 

          COMPREHENSIVE METABOLIC 58917     ANION GAP           7 MEQ/L   09/10/

2015 Unknown

 

          THYROID STIMULATING HORMONE 88112     TSH                 2.427 uIU/ML

 2015 Unknown

 

          LIPID GROUP [id number]     HDL TEST            47 MG/DL  2015 Unknown

 

          LIPID GROUP [id number]     TRIG                145 MG/DL 2015 Unknown

 

          LIPID GROUP [id number]     TEST LDL            73 MG/DL  2015 Unknown

 

          LIPID GROUP 36584     CHOL                149 MG/DL 2015 Unknown

 

          LIPID GROUP [id number]     RCHOL/HDL           3.17 RATIO 2015 Unknow

n

 

          LIPID GROUP 21090     NON-HDL CH           102 MG/DL 2015 Unknow

n

 

          COMPREHENSIVE METABOLIC 74953     AST                 17 U/L    2015 Unknown

 

          COMPREHENSIVE METABOLIC 03109     ALT                 9 IU/L    2015 Unknown

 

          COMPREHENSIVE METABOLIC 77478     BUN                 19 MG/DL  2015 Unknown

 

          COMPREHENSIVE METABOLIC 65307     ALBUMIN             4.3 GM/DL 2015 Unknown

 

          COMPREHENSIVE METABOLIC 24404     CHLORIDE            108 MMOL/L  Unknown

 

          COMPREHENSIVE METABOLIC 44598     BILI TOT            0.5 MG/DL 2015 Unknown

 

          COMPREHENSIVE METABOLIC 39390     ALK PHOS            68 U/L    2015 Unknown

 

          COMPREHENSIVE METABOLIC 89498     SODIUM              140 MMOL/L  Unknown

 

          COMPREHENSIVE METABOLIC 84310     CREATININE           1.08 MG/DL 2015 Unknown

 

          COMPREHENSIVE METABOLIC 04287     CALCIUM             9.9 MG/DL 2015 Unknown

 

          COMPREHENSIVE METABOLIC 52330     POTASSIUM           4.3 MMOL/L  Unknown

 

          COMPREHENSIVE METABOLIC 72716     PROT TOT            7.2 GM/DL 2015 Unknown

 

          COMPREHENSIVE METABOLIC 87714     Glucose             94 MG/DL  2015 Unknown

 

          COMPREHENSIVE METABOLIC 04151     BICARB              26 MMOL/L 2015 Unknown

 

          COMPREHENSIVE METABOLIC 79674     ANION GAP           6 MEQ/L   2015 Unknown

 

          GFR CALC  6663309   GFR AA              60.0L ML/MIN 2015 Unknow

n

 

          GFR CALC  5661777   GFR NON-AA           49.0L ML/MIN 2015 Unkno

wn

 

          GLYCOSYLATED HEMOGLOBIN TEST 99647     A1C HPLC  51379-0   5.6 %     0

3/06/2015 Unknown

 

          COMPLETE BLOOD COUNT 6666595   WBC                 7.2 10e9/L 20

15 Unknown

 

          COMPLETE BLOOD COUNT 8087122   RBC                 4.28 10e12/L 2015 Unknown

 

          COMPLETE BLOOD COUNT 7253223   HGB                 12.8 g/dL 

5 Unknown

 

          COMPLETE BLOOD COUNT 2761281   HCT DET             39.3 %    

5 Unknown

 

          COMPLETE BLOOD COUNT 6244192   MCV                 91.8 fL   

5 Unknown

 

          COMPLETE BLOOD COUNT 2313207   MCH                 29.9 pg   

5 Unknown

 

          COMPLETE BLOOD COUNT 0167183   MCHC                32.6 g/dL 

5 Unknown

 

          COMPLETE BLOOD COUNT 0712536   PLT                 189 10e9/L 20

15 Unknown

 

          COMPLETE BLOOD COUNT 2129288   MPV                 11.2 fL   

5 Unknown

 

          COMPLETE BLOOD COUNT 2015452   ARIK %               38.0 %    

5 Unknown

 

          COMPLETE BLOOD COUNT 7989396   LY %                51.0 %    

5 Unknown

 

          COMPLETE BLOOD COUNT 7454055   MON %               7.7 %     

5 Unknown

 

          COMPLETE BLOOD COUNT 0172125   EOS  %              2.9 %     

5 Unknown

 

          COMPLETE BLOOD COUNT 6692102   BASO %              0.4 %     

5 Unknown

 

          COMPLETE BLOOD COUNT 7079670   RDW                 14.0 %    

5 Unknown

 

          COMPLETE BLOOD COUNT 3349782   ABS ARIK             2.74 10e9/L 

015 Unknown

 

          COMPLETE BLOOD COUNT [id number]   ABS LYMPH           3.67 10e9/L 

015 Unknown

 

          COMPLETE BLOOD COUNT 1131072   ABS MONO            0.55 10e9/L 

015 Unknown

 

          COMPLETE BLOOD COUNT 2992404   ABS EOS             0.21 10e9/L 

015 Unknown

 

          COMPLETE BLOOD COUNT 4894578   ABS BASO            0.03 10e9/L 

015 Unknown

 

          COMPLETE BLOOD COUNT [id number]   RDW-SD              46.1 fL   

5 Unknown

 

          FREE T4   90804     FREE T4             1.14 NG/DL 2015 Unknown

 

          GLYCOSYLATED HEMOGLOBIN TEST 54166     A1C HPLC  75002-2   5.2 %     0

2014 Unknown

 

          FREE T4   75292     FREE T4             1.40 NG/DL 2014 Unknown

 

          GFR CALC  9946957   GFR AA              >60 ML/MIN 2014 Unknown

 

          GFR CALC  3235850   GFR NON-AA           52.0L ML/MIN 2014 Unkno

wn

 

          COMPREHENSIVE METABOLIC 97216     AST                 15 U/L    2014 Unknown

 

          COMPREHENSIVE METABOLIC 45447     ALT                 9 IU/L    2014 Unknown

 

          COMPREHENSIVE METABOLIC 42214     BUN                 17 MG/DL  2014 Unknown

 

          COMPREHENSIVE METABOLIC 83033     ALBUMIN             4.0 GM/DL 2014 Unknown

 

          COMPREHENSIVE METABOLIC 21269     CHLORIDE            112 MMOL/L  Unknown

 

          COMPREHENSIVE METABOLIC 21124     BILI TOT            0.5 MG/DL 2014 Unknown

 

          COMPREHENSIVE METABOLIC 22026     ALK PHOS            66 U/L    2014 Unknown

 

          COMPREHENSIVE METABOLIC 09358     SODIUM              140 MMOL/L  Unknown

 

          COMPREHENSIVE METABOLIC 08651     CREATININE           1.03 MG/DL  Unknown

 

          COMPREHENSIVE METABOLIC 47148     CALCIUM             9.5 MG/DL 2014 Unknown

 

          COMPREHENSIVE METABOLIC 75414     POTASSIUM           4.1 MMOL/L  Unknown

 

          COMPREHENSIVE METABOLIC 41545     PROT TOT            6.2 GM/DL 2014 Unknown

 

          COMPREHENSIVE METABOLIC 54976     Glucose             102 MG/DL 2014 Unknown

 

          COMPREHENSIVE METABOLIC 95108     BICARB              23 MMOL/L 2014 Unknown

 

          COMPREHENSIVE METABOLIC 91125     ANION GAP           5 MEQ/L   2014 Unknown

 

          THYROID STIMULATING HORMONE 09356     TSH                 2.074 uIU/ML

 2014 Unknown

 

          VITAMIN B 12 FOLIC ACID [id number] VIT B 12            423 PG/ML  Unknown

 

          VITAMIN B 12 FOLIC ACID 04585|[id number] FOLIC ACID           19.7 NG/ML  Unknown

 

          LIPID GROUP [id number]     HDL TEST            40 MG/DL  2014 Unknown

 

          LIPID GROUP [id number]     TRIG                145 MG/DL 2014 Unknown

 

          LIPID GROUP 86676     TEST LDL            81 MG/DL  2014 Unknown

 

          LIPID GROUP 02469     CHOL                150 MG/DL 2014 Unknown

 

          LIPID GROUP [id number]     RCHOL/HDL           3.75 RATIO 2014 Unknow

n

 

          COMPLETE BLOOD COUNT [id number]   WBC                 6.0 10e9/L 20

14 Unknown

 

          COMPLETE BLOOD COUNT 0979935   RBC                 4.26 10e12/L 2014 Unknown

 

          COMPLETE BLOOD COUNT 5022074   HGB                 12.7 g/dL 

4 Unknown

 

          COMPLETE BLOOD COUNT 6129876   HCT DET             38.7 %    

4 Unknown

 

          COMPLETE BLOOD COUNT 9155335   MCV                 90.8 fL   

4 Unknown

 

          COMPLETE BLOOD COUNT 9883226   MCH                 29.8 pg   

4 Unknown

 

          COMPLETE BLOOD COUNT 9053598   MCHC                32.8 g/dL 

4 Unknown

 

          COMPLETE BLOOD COUNT 9372533   PLT                 178 10e9/L 20

14 Unknown

 

          COMPLETE BLOOD COUNT 7647037   MPV                 11.7 fL   

4 Unknown

 

          COMPLETE BLOOD COUNT 0985804   ARIK %               30.5 %    

4 Unknown

 

          COMPLETE BLOOD COUNT 9603651   LY %                55.4 %    

4 Unknown

 

          COMPLETE BLOOD COUNT [id number]   MON %               9.0 %     

4 Unknown

 

          COMPLETE BLOOD COUNT [id number]   EOS  %              4.4 %     

4 Unknown

 

          COMPLETE BLOOD COUNT 7928833   BASO %              0.7 %     

4 Unknown

 

          COMPLETE BLOOD COUNT 5685135   RDW                 13.3 %    

4 Unknown

 

          COMPLETE BLOOD COUNT [id number]   ABS ARIK             1.83 10e9/L 

014 Unknown

 

          COMPLETE BLOOD COUNT [id number]   ABS LYMPH           3.32 10e9/L 

014 Unknown

 

          COMPLETE BLOOD COUNT [id number]   ABS MONO            0.54 10e9/L 

014 Unknown

 

          COMPLETE BLOOD COUNT 6055966   ABS EOS             0.26 10e9/L 

014 Unknown

 

          COMPLETE BLOOD COUNT 7981507   ABS BASO            0.04 10e9/L 

014 Unknown

 

          COMPLETE BLOOD COUNT 9898417   RDW-SD              43.2 fL   

4 Unknown

 

          HEMOGLOBIN A1C (GLYCOSYLATED) 7558562   A1C HPLC  18243-2   5.5 %     

2012 Unknown

 

          COMPLETE BLOOD COUNT [id number]   WBC                 6.0 10e9/L 20

12 Unknown

 

          COMPLETE BLOOD COUNT [id number]   RBC                 4.22 10e12/L 2012 Unknown

 

          COMPLETE BLOOD COUNT [id number]   HGB                 12.4 g/dL 

2 Unknown

 

          COMPLETE BLOOD COUNT [id number]   HCT DET             38.2 %    

2 Unknown

 

          COMPLETE BLOOD COUNT 6321635   MCV                 90.5 fL   

2 Unknown

 

          COMPLETE BLOOD COUNT [id number]   MCH                 29.4 pg   

2 Unknown

 

          COMPLETE BLOOD COUNT [id number]   MCHC                32.5 g/dL 

2 Unknown

 

          COMPLETE BLOOD COUNT [id number]   PLT                 187 10e9/L 20

12 Unknown

 

          COMPLETE BLOOD COUNT [id number]   MPV                 11.5 fL   

2 Unknown

 

          COMPLETE BLOOD COUNT [id number]   ARIK %               36.4 %    

2 Unknown

 

          COMPLETE BLOOD COUNT [id number]   LY %                51.0 %    

2 Unknown

 

          COMPLETE BLOOD COUNT 9082536   MON %               8.7 %     

2 Unknown

 

          COMPLETE BLOOD COUNT [id number]   EOS  %              3.2 %     

2 Unknown

 

          COMPLETE BLOOD COUNT [id number]   BASO %              0.7 %     

2 Unknown

 

          COMPLETE BLOOD COUNT [id number]   RDW                 13.7 %    

2 Unknown

 

          COMPLETE BLOOD COUNT [id number]   ABS ARIK             2.18 10e9/L 

012 Unknown

 

          COMPLETE BLOOD COUNT [id number]   ABS LYMPH           3.06 10e9/L 

012 Unknown

 

          COMPLETE BLOOD COUNT [id number]   ABS MONO            0.52 10e9/L 

012 Unknown

 

          COMPLETE BLOOD COUNT [id number]   ABS EOS             0.19 10e9/L 

012 Unknown

 

          COMPLETE BLOOD COUNT [id number]   ABS BASO            0.04 10e9/L 

012 Unknown

 

          COMPLETE BLOOD COUNT 5893486   RDW-SD              44.3 fL   

2 Unknown

 

          LIPID GROUP [id number]     HDL TEST            42 MG/DL  2012 Unknown

 

          LIPID GROUP [id number]     TRIG                156 MG/DL 2012 Unknown

 

          LIPID GROUP [id number]     TEST LDL            80 MG/DL  2012 Unknown

 

          LIPID GROUP [id number]     CHOL                153 MG/DL 2012 Unknown

 

          LIPID GROUP [id number]     RCHOL/HDL           3.64 RATIO 2012 Unknow

n

 

          FREE T4   82283     FREE T4             1.22 NG/DL 2012 Unknown

 

          COMPREHENSIVE METABOLIC 31097     AST                 20 U/L    2012 Unknown

 

          COMPREHENSIVE METABOLIC 26936     ALT                 11 IU/L   2012 Unknown

 

          COMPREHENSIVE METABOLIC 07218     BUN                 19 MG/DL  2012 Unknown

 

          COMPREHENSIVE METABOLIC 11267     ALBUMIN             4.3 GM/DL 2012 Unknown

 

          COMPREHENSIVE METABOLIC 82340     CHLORIDE            109 MMOL/L  Unknown

 

          COMPREHENSIVE METABOLIC 13730     BILI TOT            0.6 MG/DL 2012 Unknown

 

          COMPREHENSIVE METABOLIC 84523     ALK PHOS            84 U/L    2012 Unknown

 

          COMPREHENSIVE METABOLIC 42971     SODIUM              142 MMOL/L  Unknown

 

          COMPREHENSIVE METABOLIC 00452     CREATININE           1.09 MG/DL  Unknown

 

          COMPREHENSIVE METABOLIC 95781     CALCIUM             9.8 MG/DL 2012 Unknown

 

          COMPREHENSIVE METABOLIC 80113     POTASSIUM           4.2 MMOL/L  Unknown

 

          COMPREHENSIVE METABOLIC 42615     PROT TOT            6.4 GM/DL 2012 Unknown

 

          COMPREHENSIVE METABOLIC 74807     Glucose             89 MG/DL  2012 Unknown

 

          COMPREHENSIVE METABOLIC 03729     BICARB              25 MMOL/L 2012 Unknown

 

          COMPREHENSIVE METABOLIC 48372     ANION GAP           8 MEQ/L   2012 Unknown

 

          GFR CALC  [id number]   GFR AA              60.0L ML/MIN 2012 Unknow

n

 

          GFR CALC  [id number]   GFR NON-AA           49.0L ML/MIN 2012 Unkno

wn

 

          THYROID STIMULATING HORMONE 19677     TSH                 2.450 uIU/ML

 2012 Unknown

 

          COMPREHENSIVE METABOLIC 74377     AST                 22 U/L    2012 Unknown

 

          COMPREHENSIVE METABOLIC 26319     ALT                 14 IU/L   2012 Unknown

 

          COMPREHENSIVE METABOLIC 79555     BUN                 21 MG/DL  2012 Unknown

 

          COMPREHENSIVE METABOLIC 89212     ALBUMIN             4.3 GM/DL 2012 Unknown

 

          COMPREHENSIVE METABOLIC 25287     CHLORIDE            106 MMOL/L  Unknown

 

          COMPREHENSIVE METABOLIC 12190     BILI TOT            0.4 MG/DL 2012 Unknown

 

          COMPREHENSIVE METABOLIC 04304     ALK PHOS            80 U/L    2012 Unknown

 

          COMPREHENSIVE METABOLIC 83465     SODIUM              141 MMOL/L  Unknown

 

          COMPREHENSIVE METABOLIC 30397     CREATININE           1.13 MG/DL  Unknown

 

          COMPREHENSIVE METABOLIC 40634     CALCIUM             9.4 MG/DL 2012 Unknown

 

          COMPREHENSIVE METABOLIC 93592     POTASSIUM           4.3 MMOL/L  Unknown

 

          COMPREHENSIVE METABOLIC 32645     PROT TOT            6.7 GM/DL 2012 Unknown

 

          COMPREHENSIVE METABOLIC 07392     Glucose             98 MG/DL  2012 Unknown

 

          COMPREHENSIVE METABOLIC 67004     BICARB              25 MMOL/L 2012 Unknown

 

          COMPREHENSIVE METABOLIC 37699     ANION GAP           10 MEQ/L  2012 Unknown

 

          LIPID GROUP [id number]     HDL TEST            44 MG/DL  2012 Unknown

 

          LIPID GROUP [id number]     TRIG                164 MG/DL 2012 Unknown

 

          LIPID GROUP [id number]     TEST LDL            98 MG/DL  2012 Unknown

 

          LIPID GROUP [id number]     CHOL                175 MG/DL 2012 Unknown

 

          LIPID GROUP [id number]     RCHOL/HDL           3.98 RATIO 2012 Unknow

n

 

          COMPLETE BLOOD COUNT 25196     WBC                 6.7 10e9/L 20

12 Unknown

 

          COMPLETE BLOOD COUNT 27651     RBC                 4.36 10e12/L 2012 Unknown

 

          COMPLETE BLOOD COUNT 25622     HGB                 12.9 g/dL 

2 Unknown

 

          COMPLETE BLOOD COUNT 96257     HCT DET             39.4 %    

2 Unknown

 

          COMPLETE BLOOD COUNT 86916     MCV                 90.4 fL   

2 Unknown

 

          COMPLETE BLOOD COUNT 79530     MCH                 29.6 pg   

2 Unknown

 

          COMPLETE BLOOD COUNT 59451     MCHC                32.7 g/dL 

2 Unknown

 

          COMPLETE BLOOD COUNT 37322     PLT                 184 10e9/L 20

12 Unknown

 

          COMPLETE BLOOD COUNT 28162     MPV                 10.9 fL   

2 Unknown

 

          COMPLETE BLOOD COUNT 53149     ARIK %               41.5 %    

2 Unknown

 

          COMPLETE BLOOD COUNT 58208     LY %                45.7 %    

2 Unknown

 

          COMPLETE BLOOD COUNT 05125     MON %               9.4 %     

2 Unknown

 

          COMPLETE BLOOD COUNT 09211     EOS  %              3.0 %     

2 Unknown

 

          COMPLETE BLOOD COUNT 27988     BASO %              0.4 %     

2 Unknown

 

          COMPLETE BLOOD COUNT 40618     RDW                 13.2 %    

2 Unknown

 

          COMPLETE BLOOD COUNT 69987     ABS ARIK             2.78 10e9/L 

012 Unknown

 

          COMPLETE BLOOD COUNT 38363     ABS LYMPH           3.06 10e9/L 

012 Unknown

 

          COMPLETE BLOOD COUNT 42006     ABS MONO            0.63 10e9/L 

012 Unknown

 

          COMPLETE BLOOD COUNT 50441     ABS EOS             0.20 10e9/L 

012 Unknown

 

          COMPLETE BLOOD COUNT 61766     ABS BASO            0.03 10e9/L 

012 Unknown

 

          COMPLETE BLOOD COUNT 61973     RDW-SD              42.3 fL   

2 Unknown

 

          GFR CALC  2403105   GFR AA              57.0L ML/MIN 2012 Unknow

n

 

          GFR CALC  0785325   GFR NON-AA           47.0L ML/MIN 2012 Unkno

wn

 

          THYROID STIMULATING HORMONE 02047     TSH                 2.663 uIU/ML

 2012 Unknown

 

          FREE T4   89794     FREE T4             1.15 NG/DL 2012 Unknown

 

          THYROID STIMULATING HORMONE 93310     TSH                 1.908 uIU/ML

 2011 Unknown

 

          COMPLETE BLOOD COUNT 06370     WBC                 6.4 10e9/L 20

11 Unknown

 

          COMPLETE BLOOD COUNT 39112     RBC                 3.92 10e12/L 2011 Unknown

 

          COMPLETE BLOOD COUNT 81700     HGB                 11.8 g/dL 

1 Unknown

 

          COMPLETE BLOOD COUNT 95942     HCT DET             36.0 %    

1 Unknown

 

          COMPLETE BLOOD COUNT 48960     MCV                 91.8 fL   

1 Unknown

 

          COMPLETE BLOOD COUNT 39408     MCH                 30.1 pg   

1 Unknown

 

          COMPLETE BLOOD COUNT 80583     MCHC                32.8 g/dL 

1 Unknown

 

          COMPLETE BLOOD COUNT 49912     PLT                 176 10e9/L 20

11 Unknown

 

          COMPLETE BLOOD COUNT 73844     MPV                 11.4 fL   

1 Unknown

 

          COMPLETE BLOOD COUNT 80360     ARIK %               50.4 %    

1 Unknown

 

          COMPLETE BLOOD COUNT 31137     LY %                35.5 %    

1 Unknown

 

          COMPLETE BLOOD COUNT 07123     MON %               10.2 %    

1 Unknown

 

          COMPLETE BLOOD COUNT 95208     EOS  %              3.3 %     

1 Unknown

 

          COMPLETE BLOOD COUNT 58583     BASO %              0.6 %     

1 Unknown

 

          COMPLETE BLOOD COUNT 29092     RDW                 13.7 %    

1 Unknown

 

          COMPLETE BLOOD COUNT 19205     ABS ARIK             3.23 10e9/L 

011 Unknown

 

          COMPLETE BLOOD COUNT 81117     ABS LYMPH           2.27 10e9/L 

011 Unknown

 

          COMPLETE BLOOD COUNT 64497     ABS MONO            0.65 10e9/L 

011 Unknown

 

          COMPLETE BLOOD COUNT 44156     ABS EOS             0.21 10e9/L 

011 Unknown

 

          COMPLETE BLOOD COUNT 91359     ABS BASO            0.04 10e9/L 

011 Unknown

 

          COMPLETE BLOOD COUNT 88544     RDW-SD              45.3 fL   

1 Unknown

 

          GFR CALC  7836939   GFR AA              >60 ML/MIN 2011 Unknown

 

          GFR CALC  6840826   GFR NON-AA           53.0L ML/MIN 2011 Unkno

wn

 

          FREE T4   52742     FREE T4             1.20 NG/DL 2011 Unknown

 

          COMPREHENSIVE METABOLIC 40475     AST                 17 U/L    2011 Unknown

 

          COMPREHENSIVE METABOLIC 44489     ALT                 9 IU/L    2011 Unknown

 

          COMPREHENSIVE METABOLIC 16408     BUN                 16 MG/DL  2011 Unknown

 

          COMPREHENSIVE METABOLIC 95545     ALBUMIN             4.0 GM/DL 2011 Unknown

 

          COMPREHENSIVE METABOLIC 40713     CHLORIDE            108 MMOL/L  Unknown

 

          COMPREHENSIVE METABOLIC 55551     BILI TOT            0.5 MG/DL 2011 Unknown

 

          COMPREHENSIVE METABOLIC 77075     ALK PHOS            76 U/L    2011 Unknown

 

          COMPREHENSIVE METABOLIC 23937     SODIUM              139 MMOL/L  Unknown

 

          COMPREHENSIVE METABOLIC 40655     CREATININE           1.02 MG/DL 2011 Unknown

 

          COMPREHENSIVE METABOLIC 39543     CALCIUM             9.2 MG/DL 2011 Unknown

 

          COMPREHENSIVE METABOLIC 22126     POTASSIUM           4.5 MMOL/L  Unknown

 

          COMPREHENSIVE METABOLIC 50233     PROT TOT            6.1 GM/DL 2011 Unknown

 

          COMPREHENSIVE METABOLIC 44929     Glucose             93 MG/DL  2011 Unknown

 

          COMPREHENSIVE METABOLIC 92232     BICARB              26 MMOL/L 2011 Unknown

 

          COMPREHENSIVE METABOLIC 39736     ANION GAP           5 MEQ/L   2011 Unknown

 

          LIPID GROUP [id number]     HDL TEST            46 MG/DL  2011 Unknown

 

          LIPID GROUP [id number]     TRIG                102 MG/DL 2011 Unknown

 

          LIPID GROUP [id number]     TEST LDL            88 MG/DL  2011 Unknown

 

          LIPID GROUP 74294     CHOL                154 MG/DL 2011 Unknown

 

          LIPID GROUP [id number]     RCHOL/HDL           3.35 RATIO 2011 Unknow

n







Procedures





                    Procedure           Codes               Date

 

                    ROUTINE VENIPUNCTURE CPT-4: 48984        2020

 

                    ASSAY THYROID STIM HORMONE CPT-4: 35473        2020

 

                    COMPLETE CBC W/AUTO DIFF WBC CPT-4: 06349        2020

 

                    COMPREHEN METABOLIC PANEL CPT-4: 46231        2020

 

                    ROUTINE VENIPUNCTURE CPT-4: 53869        2019

 

                    LIPID PANEL         CPT-4: 41283        2019

 

                    FLU VACC PRSV FREE INC ANTIG 65 AND OLDER CPT-4: 91849      

  10/22/2019

 

                    FLU VACC PRSV FREE INC ANTIG 65 AND OLDER CPT-4: 51847      

  10/22/2019

 

                    ADMIN INFLUENZA VIRUS VAC CPT-4:         10/22/2019

 

                    COMPREHEN METABOLIC PANEL CPT-4: 30628        10/11/2019

 

                    ROUTINE VENIPUNCTURE CPT-4: 32937        10/11/2019

 

                    ROUTINE VENIPUNCTURE CPT-4: 56255        06/10/2019

 

                    ASSAY THYROID STIM HORMONE CPT-4: 68651        06/10/2019

 

                    COMPREHEN METABOLIC PANEL CPT-4: 59199        06/10/2019

 

                    COMPLETE CBC W/AUTO DIFF WBC CPT-4: 20487        06/10/2019

 

                    URINALYSIS NONAUTO W/O SCOPE CPT-4: 52716        2019

 

                    URINE CULTURE/ COLONY COUNT CPT-4: 31091        2019

 

                    URINE CULTURE/ COLONY COUNT CPT-4: 99657        10/02/2018

 

                    ROUTINE VENIPUNCTURE CPT-4: 50024        2018

 

                    ASSAY OF FREE THYROXINE CPT-4: 44060        2018

 

                    ASSAY THYROID STIM HORMONE CPT-4: 48823        2018

 

                    COMPLETE CBC W/AUTO DIFF WBC CPT-4: 41265        2018

 

                    METABOLIC PANEL TOTAL CA CPT-4: 99599        2018

 

                    FLU VACC PRSV FREE INC ANTIG 65 AND OLDER CPT-4: 97171      

  2018

 

                    ASSAY, GLUCOSE, BLOOD QUANT CPT-4: 86373        2018

 

                    ADMIN INFLUENZA VIRUS VAC CPT-4:         2018

 

                    ROUTINE VENIPUNCTURE CPT-4: 70772        2018

 

                    COMPREHEN METABOLIC PANEL CPT-4: 02526        2018

 

                    COMPLETE CBC W/AUTO DIFF WBC CPT-4: 68936        2018

 

                    A1C HPLC            CPT-4: 67093        2018

 

                    ASSAY OF TROPONIN QUANT CPT-4: 75274        2018

 

                    LIPID PANEL         CPT-4: 31119        2018

 

                    THER/PROPH/DIAG INJ SC/IM CPT-4: 68583        2018

 

                    TRIAMCINOLONE ACET INJ NOS CPT-4:         2018

 

                    URINALYSIS NONAUTO W/O SCOPE CPT-4: 56498        2018

 

                    URINE CULTURE/ COLONY COUNT CPT-4: 90553        2018

 

                    FLU VACC PRSV FREE INC ANTIG 65 AND OLDER CPT-4: 80416      

  10/06/2017

 

                    ADMIN INFLUENZA VIRUS VAC CPT-4:         10/06/2017

 

                    ROUTINE VENIPUNCTURE CPT-4: 27631        2017

 

                    COMPREHEN METABOLIC PANEL CPT-4: 31065        2017

 

                    COMPLETE CBC W/AUTO DIFF WBC CPT-4: 50153        2017

 

                    LIPID PANEL         CPT-4: 71990        2017

 

                    A1C HPLC            CPT-4: 77380        2017

 

                    ASSAY THYROID STIM HORMONE CPT-4: 71828        2017

 

                    ROUTINE VENIPUNCTURE CPT-4: 90798        2017

 

                    ASSAY THYROID STIM HORMONE CPT-4: 79758        2017

 

                    COMPREHEN METABOLIC PANEL CPT-4: 08762        2017

 

                    COMPLETE CBC W/AUTO DIFF WBC CPT-4: 84949        2017

 

                    A1C HPLC            CPT-4: 72859        2017

 

                    FLU VACC PRSV FREE INC ANTIG 65 AND OLDER CPT-4: 49593      

  10/07/2016

 

                    ADMIN INFLUENZA VIRUS VAC CPT-4:         10/07/2016

 

                    ROUTINE VENIPUNCTURE CPT-4: 57481        2016

 

                    ASSAY OF FREE THYROXINE CPT-4: 71610        2016

 

                    ASSAY THYROID STIM HORMONE CPT-4: 13506        2016

 

                    COMPREHEN METABOLIC PANEL CPT-4: 16280        2016

 

                    COMPLETE CBC W/AUTO DIFF WBC CPT-4: 79616        2016

 

                    LIPID PANEL         CPT-4: 78797        2016

 

                    A1C HPLC            CPT-4: 11895        2016

 

                    URINALYSIS NONAUTO W/O SCOPE CPT-4: 62408        2016

 

                    ROUTINE VENIPUNCTURE CPT-4: 57600        2015

 

                    METABOLIC PANEL TOTAL CA CPT-4: 54845        2015

 

                    PRESCRIP TRANSMIT VIA ERX SY CPT-4:         2015

 

                    FLU VACC PRSV FREE INC ANTIG 65 AND OLDER CPT-4: 01766      

  10/16/2015

 

                    ADMIN INFLUENZA VIRUS VAC CPT-4:         10/16/2015

 

                    URINALYSIS NONAUTO W/O SCOPE CPT-4: 48199        09/15/2015

 

                    URINE CULTURE/ COLONY COUNT CPT-4: 81010        09/15/2015

 

                    ROUTINE VENIPUNCTURE CPT-4: 37016        09/10/2015

 

                    ASSAY OF FREE THYROXINE CPT-4: 76143        09/10/2015

 

                    ASSAY THYROID STIM HORMONE CPT-4: 96396        09/10/2015

 

                    COMPREHEN METABOLIC PANEL CPT-4: 18824        09/10/2015

 

                    COMPLETE CBC W/AUTO DIFF WBC CPT-4: 40121        09/10/2015

 

                    LIPID PANEL         CPT-4: 80898        09/10/2015

 

                    A1C HPLC            CPT-4: 01994        09/10/2015

 

                    CERUM REMOVAL       CPT-4: 88109        2015

 

                    PRESCRIP TRANSMIT VIA ERX SY CPT-4:         2015

 

                    FLUZONE, 5ML (Medicare) CPT-4:         10/17/2014

 

                    ADMIN INFLUENZA VIRUS VAC CPT-4:         10/17/2014

 

                    PRESCRIP TRANSMIT VIA ERX SY CPT-4:         2014

 

                    PRESCRIP TRANSMIT VIA ERX SY CPT-4:         2014

 

                    PRESCRIP TRANSMIT VIA ERX SY CPT-4:         2014

 

                    ROUTINE VENIPUNCTURE CPT-4: 72281        2014

 

                    ASSAY OF FREE THYROXINE CPT-4: 54475        2014

 

                    ASSAY THYROID STIM HORMONE CPT-4: 93879        2014

 

                    COMPREHEN METABOLIC PANEL CPT-4: 43654        2014

 

                    COMPLETE CBC W/AUTO DIFF WBC CPT-4: 59163        2014

 

                    LIPID PANEL         CPT-4: 05433        2014

 

                    A1C HPLC            CPT-4: 22164        2014

 

                    VITAMIN B 12 FOLIC ACID CPT-4: 24746|24830  2014

 

                    PRESCRIP TRANSMIT VIA ERX SY CPT-4:         2014

 

                    PRESCRIP TRANSMIT VIA ERX SY CPT-4:         10/15/2013

 

                    FLUZONE, 5ML (Medicare) CPT-4:         2013

 

                    ADMIN INFLUENZA VIRUS VAC CPT-4:         2013

 

                    PRESCRIP TRANSMIT VIA ERX SY CPT-4:         2013

 

                    PRESCRIP TRANSMIT VIA ERX SY CPT-4:         05/10/2013

 

                    ROUTINE VENIPUNCTURE CPT-4: 32109        2012

 

                    ASSAY OF FREE THYROXINE CPT-4: 87489        2012

 

                    ASSAY THYROID STIM HORMONE CPT-4: 99465        2012

 

                    COMPREHEN METABOLIC PANEL CPT-4: 12540        2012

 

                    COMPLETE CBC W/AUTO DIFF WBC CPT-4: 60456        2012

 

                    LIPID PANEL         CPT-4: 38587        2012

 

                    A1C GLYCOSYLATED HEMOGLOBIN TEST CPT-4: 91240        

012

 

                    CERUM REMOVAL       CPT-4: 51792        2012

 

                    PRESCRIP TRANSMIT VIA ERX SY CPT-4:         2012

 

                    PRESCRIP TRANSMIT VIA ERX SY CPT-4:         10/10/2012

 

                    FLUZONE, 5ML (Medicare) CPT-4:         2012

 

                    ADMIN INFLUENZA VIRUS VAC CPT-4:         2012

 

                    ASSAY, GLUCOSE, BLOOD QUANT CPT-4: 95653        2012

 

                    URINALYSIS NONAUTO W/O SCOPE CPT-4: 49717        2012

 

                    URINE CULTURE/ COLONY COUNT CPT-4: 28372        2012

 

                    ROUTINE VENIPUNCTURE CPT-4: 51177        2012

 

                    ASSAY OF FREE THYROXINE CPT-4: 47927        2012

 

                    ASSAY THYROID STIM HORMONE CPT-4: 04105        2012

 

                    COMPREHEN METABOLIC PANEL CPT-4: 89056        2012

 

                    COMPLETE CBC W/AUTO DIFF WBC CPT-4: 83134        2012

 

                    LIPID PANEL         CPT-4: 78331        2012

 

                    ASSAY OF INSULIN    CPT-4: 75544        2012

 

                    A1C GLYCOSYLATED HEMOGLOBIN TEST CPT-4: 59927        

012

 

                    DRAIN/INJECT JOINT/BURSA CPT-4: 73919        2012

 

                    METHYLPREDNISOLONE 40 MG INJ CPT-4:         2012

 

                    TRIAMCINOLONE ACET INJ NOS CPT-4:         2012

 

                    PRESCRIP TRANSMIT VIA ERX SY CPT-4:         2012

 

                    PRESCRIP TRANSMIT VIA ERX SY CPT-4:         2012

 

                    METHYLPREDNISOLONE 40 MG INJ CPT-4:         2012

 

                    DRAIN/INJECT JOINT/BURSA CPT-4: 37634        2012

 

                    TRIAMCINOLONE ACET INJ NOS CPT-4:         2012

 

                    PRESCRIP TRANSMIT VIA ERX SY CPT-4:         2012

 

                    ROUTINE VENIPUNCTURE CPT-4: 84386        2012

 

                    ASSAY OF FREE THYROXINE CPT-4: 02643        2012

 

                    ASSAY THYROID STIM HORMONE CPT-4: 44671        2012

 

                    COMPREHEN METABOLIC PANEL CPT-4: 65717        2012

 

                    COMPLETE CBC W/AUTO DIFF WBC CPT-4: 16926        2012

 

                    LIPID PANEL         CPT-4: 50623        2012

 

                    PRESCRIP TRANSMIT VIA ERX SY CPT-4:         2012

 

                    CERUM REMOVAL       CPT-4: 33055        2011

 

                    PRESCRIP TRANSMIT VIA ERX SY CPT-4:         2011

 

                    FLUZONE, 5ML (Medicare) CPT-4:         10/05/2011

 

                    ADMIN INFLUENZA VIRUS VAC CPT-4:         10/05/2011

 

                    PRESCRIP TRANSMIT VIA ERX SY CPT-4:         2011

 

                    URINALYSIS NONAUTO W/O SCOPE CPT-4: 08372        2011

 

                    URINE CULTURE/ COLONY COUNT CPT-4: 84470        2011

 

                    CUR TOBACCO NON-USER CPT-4:         2011

 

                    ROUTINE VENIPUNCTURE CPT-4: 59751        2011

 

                    COMPLETE CBC W/AUTO DIFF WBC CPT-4: 40919        2011

 

                    COMPREHEN METABOLIC PANEL CPT-4: 05357        2011

 

                    LIPID PANEL         CPT-4: 32370        2011

 

                    ASSAY THYROID STIM HORMONE CPT-4: 44825        2011

 

                    ASSAY OF FREE THYROXINE CPT-4: 27578        2011

 

                    PRESCRIP TRANSMIT VIA ERX SY CPT-4:         2011

 

                    INJ TRIGGER POINT 1/2 MUSCL CPT-4: 26551        2011

 

                    TRIAMCINOLONE ACET INJ NOS CPT-4:         2011

 

                    METHYLPREDNISOLONE 40 MG INJ CPT-4:         2011

 

                    THER/PROPH/DIAG INJ SC/IM CPT-4: 03644        2011

 

                    KETOROLAC TROMETHAMINE INJ CPT-4:         2011

 

                    PRESCRIP TRANSMIT VIA ERX SY CPT-4:         2010

 

                    FLU VACCINE 3 YRS & > IM UP 64 CPT-4: 14959        10/06/201

0

 

                    ADMIN INFLUENZA VIRUS VAC CPT-4:         10/06/2010

 

                    URINALYSIS NONAUTO W/O SCOPE CPT-4: 05118        2010

 

                    URINE CULTURE/ COLONY COUNT CPT-4: 05795        2010

 

                    PRESCRIP TRANSMIT VIA ERX SY CPT-4:         2010

 

                    THER/PROPH/DIAG INJ SC/IM CPT-4: 67005        2010

 

                    VITAMIN B12 INJECTION CPT-4:         2010

 

                    THER/PROPH/DIAG INJ SC/IM CPT-4: 77906        2010

 

                    VITAMIN B12 INJECTION CPT-4:         2010

 

                    ROUTINE VENIPUNCTURE CPT-4: 46069        2010







Vital Signs





                          Date                      Vital

 

                    2020          Blood Pressure 1: 144/82 Code: 8480-6 He

art Rate 1: 56 bpm

 

                2020      Blood Pressure 1: 154/86 Code: 8480-6 Heart Rate

 1: 64 bpm 

Respiratory Rate: 20 bpm SpO2: 99%           Temperature: 37.1 (C) / 98.7 (F) We

ight: 215 

lbs 

 

             2019   Blood Pressure 1: 140/70 Code: 8480-6 Heart Rate 1: 57

 bpm SpO2: 99%    

Temperature: 35.9 (C) / 96.6 (F)        Weight: 215 lbs 

 

                06/10/2019      Blood Pressure 1: 144/70 Code: 8480-6 Heart Rate

 1: 60 bpm 

Respiratory Rate: 20 bpm SpO2: 95%           Temperature: 36.4 (C) / 97.6 (F) We

ight: 214 

lbs 

 

                2019      Blood Pressure 1: 128/78 Code: 8480-6 Heart Rate

 1: 56 bpm 

Respiratory Rate: 20 bpm SpO2: 98%           Temperature: 36.3 (C) / 97.4 (F) We

ight: 213 

lbs 

 

                2018      Blood Pressure 1: 142/60 Code: 8480-6 BMI: 38.2 

Code: 97354-5 Heart 

Rate 1: 48 bpm  Height: 5'2"    Respiratory Rate: 20 bpm SpO2: 98%       Tempera

ture: 36.7

(C) / 98.1 (F)                          Weight: 212 lbs 

 

                2018      Blood Pressure 1: 142/64 Code: 8480-6 BMI: 38.5 

Code: 12761-1 Heart 

Rate 1: 52 bpm  Height: 5'2"    Respiratory Rate: 22 bpm SpO2: 96%       Tempera

ture: 36.1

(C) / 96.9 (F)                          Weight: 214 lbs 

 

                10/02/2018      Blood Pressure 1: 124/80 Code: 8480-6 BMI: 38.3 

Code: 34119-3 Heart 

Rate 1: 68 bpm      Height: 5'2"        Respiratory Rate: 20 bpm Temperature: 36

.3 (C) / 

97.4 (F)                                Weight: 213 lbs 

 

                2018      Blood Pressure 1: 132/78 Code: 8480-6 BMI: 37.6 

Code: 84419-9 Heart 

Rate 1: 68 bpm  Height: 5'2"    Respiratory Rate: 20 bpm SpO2: 97%       Tempera

ture: 36.8

(C) / 98.2 (F)                          Weight: 209 lbs 

 

                2018      Blood Pressure 1: 150/76 Code: 8480-6 BMI: 38.5 

Code: 03274-4 Heart 

Rate 1: 64 bpm  Height: 5'2"    Respiratory Rate: 20 bpm SpO2: 97%       Tempera

ture: 36.2

(C) / 97.2 (F)                          Weight: 214 lbs 

 

                2018      Blood Pressure 1: 122/74 Code: 8480-6 BMI: 38.2 

Code: 76245-2 Heart 

Rate 1: 64 bpm  Height: 5'2"    Respiratory Rate: 18 bpm SpO2: 96%       Tempera

ture: 35.8

(C) / 96.4 (F)                          Weight: 212 lbs 

 

                2018      Blood Pressure 1: 124/78 Code: 8480-6 BMI: 37.8 

Code: 53302-2 Heart 

Rate 1: 76 bpm      Height: 5'2"        Respiratory Rate: 20 bpm Temperature: 36

.8 (C) / 

98.3 (F)                                Weight: 210 lbs 

 

                2018      Blood Pressure 1: 136/70 Code: 8480-6 BMI: 38.0 

Code: 42281-1 Heart 

Rate 1: 68 bpm  Height: 5'2"    Respiratory Rate: 20 bpm SpO2: 97%       Tempera

ture: 36.8

(C) / 98.2 (F)                          Weight: 211 lbs 

 

                2018      Blood Pressure 1: 140/65 Code: 8480-6 Heart Rate

 1: 75 bpm 

Respiratory Rate: 24 bpm SpO2: 95%           Temperature: 37.0 (C) / 98.6 (F) We

ight: 211 

lbs 

 

                2018      Blood Pressure 1: 154/70 Code: 8480-6 BMI: 37.6 

Code: 42323-1 Heart 

Rate 1: 76 bpm  Height: 5'2"    Respiratory Rate: 20 bpm SpO2: 98%       Tempera

ture: 36.9

(C) / 98.5 (F)                          Weight: 209 lbs 

 

                2017      Blood Pressure 1: 156/70 Code: 8480-6 BMI: 37.1 

Code: 10358-4 Heart 

Rate 1: 72 bpm  Height: 5'2"    Respiratory Rate: 20 bpm SpO2: 97%       Tempera

ture: 37.0

(C) / 98.6 (F)                          Weight: 206 lbs 

 

                2017      Blood Pressure 1: 152/78 Code: 8480-6 BMI: 36.8 

Code: 78375-1 Heart 

Rate 1: 78 bpm  Height: 5'2"    Respiratory Rate: 20 bpm SpO2: 98%       Tempera

ture: 36.1

(C) / 97.0 (F)                          Weight: 204 lbs 

 

                2017      Blood Pressure 1: 142/70 Code: 8480-6 BMI: 36.9 

Code: 53828-0 Heart 

Rate 1: 64 bpm  Height: 5'2"    Respiratory Rate: 20 bpm SpO2: 96%       Tempera

ture: 36.5

(C) / 97.7 (F)                          Weight: 205 lbs 

 

                2017      Blood Pressure 1: 142/68 Code: 8480-6 Heart Rate

 1: 88 bpm 

Respiratory Rate: 18 bpm SpO2: 98%           Temperature: 36.3 (C) / 97.3 (F) We

ight: 209 

lbs 

 

                2016      Blood Pressure 1: 130/78 Code: 8480-6 Heart Rate

 1: 68 bpm 

Respiratory Rate: 20 bpm SpO2: 95%           Temperature: 36.4 (C) / 97.6 (F) We

ight: 212 

lbs 

 

                2016      Blood Pressure 1: 122/64 Code: 8480-6 BMI: 39.1 

Code: 89788-6 Heart 

Rate 1: 76 bpm      Height: 5'2"        Respiratory Rate: 20 bpm Temperature: 36

.8 (C) / 

98.2 (F)                                Weight: 217 lbs 

 

                2016      Blood Pressure 1: 144/70 Code: 8480-6 BMI: 39.4 

Code: 61163-1 Heart 

Rate 1: 76 bpm      Height: 5'2"        Respiratory Rate: 20 bpm Temperature: 36

.6 (C) / 

97.9 (F)                                Weight: 219 lbs 

 

                2015      Blood Pressure 1: 152/60 Code: 8480-6 BMI: 39.6 

Code: 28464-3 Heart 

Rate 1: 84 bpm      Height: 5'2"        Respiratory Rate: 20 bpm Temperature: 37

.0 (C) / 

98.6 (F)                                Weight: 220 lbs 

 

                2015      Blood Pressure 1: 146/76 Code: 8480-6 BMI: 39.8 

Code: 25782-8 Heart 

Rate 1: 88 bpm      Height: 5'2"        Respiratory Rate: 20 bpm Temperature: 37

.0 (C) / 

98.6 (F)                                Weight: 221 lbs 

 

                2015      Blood Pressure 1: 132/70 Code: 8480-6 BMI: 39.1 

Code: 90162-7 Heart 

Rate 1: 88 bpm      Height: 5'2"        Respiratory Rate: 20 bpm Temperature: 36

.4 (C) / 

97.6 (F)                                Weight: 217 lbs 

 

                2015      Blood Pressure 1: 132/66 Code: 8480-6 BMI: 39.9 

Code: 45846-1 Heart 

Rate 1: 72 bpm      Height: 5'2"        Respiratory Rate: 20 bpm Temperature: 36

.9 (C) / 

98.4 (F)                                Weight: 218 lbs 

 

                2015      Blood Pressure 1: 136/80 Code: 8480-6 Heart Rate

 1: 76 bpm 

Respiratory Rate: 20 bpm  Temperature: 36.7 (C) / 98.0 (F) Weight: 224 lbs 

 

                2015      Blood Pressure 1: 134/78 Code: 8480-6 BMI: 39.7 

Code: 28438-7 Heart 

Rate 1: 84 bpm      Height: 5'2"        Respiratory Rate: 20 bpm Temperature: 36

.7 (C) / 

98.0 (F)                                Weight: 217 lbs 

 

                2014      Blood Pressure 1: 146/78 Code: 8480-6 BMI: 39.5 

Code: 79376-9 Heart 

Rate 1: 82 bpm      Height: 5'2"        Respiratory Rate: 18 bpm Temperature: 35

.6 (C) / 

96.1 (F)                                Weight: 216 lbs 

 

                2014      Blood Pressure 1: 134/70 Code: 8480-6 BMI: 37.9 

Code: 35768-9 Heart 

Rate 1: 80 bpm      Height: 5'3"        Respiratory Rate: 20 bpm Temperature: 36

.8 (C) / 

98.2 (F)                                Weight: 214 lbs 

 

                2014      Blood Pressure 1: 132/70 Code: 8480-6 BMI: 37.6 

Code: 88219-5 Heart 

Rate 1: 80 bpm      Height: 5'3"        Respiratory Rate: 20 bpm Temperature: 36

.8 (C) / 

98.3 (F)                                Weight: 212 lbs 

 

                2014      Blood Pressure 1: 116/74 Code: 8480-6 Heart Rate

 1: 68 bpm 

Respiratory Rate: 20 bpm  Temperature: 36.2 (C) / 97.1 (F) Weight: 212 lbs 

 

                10/15/2013      Blood Pressure 1: 132/82 Code: 8480-6 BMI: 37.4 

Code: 67208-5 Heart 

Rate 1: 76 bpm      Height: 5'3"        Respiratory Rate: 20 bpm Temperature: 36

.7 (C) / 

98.0 (F)                                Weight: 211 lbs 

 

                    2013          Blood Pressure 1: 130/76 Code: 8480-6 He

art Rate 1: 78 bpm

 

                2013      Blood Pressure 1: 140/82 Code: 8480-6 BMI: 36.8 

Code: 22826-5 Heart 

Rate 1: 66 bpm      Height: 5'3"        Respiratory Rate: 20 bpm Temperature: 36

.1 (C) / 

96.9 (F)                                Weight: 208 lbs 

 

                2013      Blood Pressure 1: 138/80 Code: 8480-6 BMI: 36.4 

Code: 73905-0 Heart 

Rate 1: 72 bpm      Height: 5'4"        Respiratory Rate: 20 bpm Temperature: 36

.7 (C) / 

98.0 (F)                                Weight: 212 lbs 

 

                2013      Blood Pressure 1: 124/70 Code: 8480-6 BMI: 36.9 

Code: 96810-7 Heart 

Rate 1: 60 bpm      Height: 5'4"        Temperature: 36.1 (C) / 97.0 (F) Weight:

 215 lbs 

 

                05/10/2013      Blood Pressure 1: 132/86 Code: 8480-6 BMI: 36.9 

Code: 28058-7 Heart 

Rate 1: 76 bpm      Height: 5'4"        Respiratory Rate: 20 bpm Temperature: 36

.8 (C) / 

98.2 (F)                                Weight: 215 lbs 

 

                2013      Blood Pressure 1: 134/82 Code: 8480-6 BMI: 36.6 

Code: 16177-7 Heart 

Rate 1: 72 bpm      Height: 5'4"        Respiratory Rate: 20 bpm Temperature: 36

.3 (C) / 

97.4 (F)                                Weight: 213 lbs 

 

                2012      Blood Pressure 1: 142/80 Code: 8480-6 BMI: 37.1 

Code: 71962-9 Heart 

Rate 1: 76 bpm      Height: 5'4"        Respiratory Rate: 20 bpm Temperature: 36

.8 (C) / 

98.3 (F)                                Weight: 216 lbs 

 

                10/23/2012      Blood Pressure 1: 128/68 Code: 8480-6 BMI: 37.6 

Code: 54095-8 Heart 

Rate 1: 72 bpm      Height: 5'4"        Respiratory Rate: 20 bpm Temperature: 36

.6 (C) / 

97.9 (F)                                Weight: 219 lbs 

 

                10/10/2012      Blood Pressure 1: 122/70 Code: 8480-6 Heart Rate

 1: 76 bpm 

Respiratory Rate: 20 bpm  Temperature: 36.7 (C) / 98.1 (F) Weight: 218 lbs 

 

                    2012          Blood Pressure 1: 132/80 Code: 8480-6 He

art Rate 1: 84 bpm

 

                2012      Blood Pressure 1: 128/78 Code: 8480-6 BMI: 38.1 

Code: 49229-7 Heart 

Rate 1: 84 bpm      Height: 5'4"        Respiratory Rate: 20 bpm Temperature: 36

.9 (C) / 

98.4 (F)                                Weight: 222 lbs 

 

                2012      Blood Pressure 1: 138/80 Code: 8480-6 BMI: 37.9 

Code: 43114-1 Heart 

Rate 1: 74 bpm      Height: 5'4"        Temperature: 36.1 (C) / 97.0 (F) Weight:

 221 lbs 

 

                2012      Blood Pressure 1: 126/78 Code: 8480-6 BMI: 38.4 

Code: 60412-8 Heart 

Rate 1: 72 bpm      Height: 5'4"        Respiratory Rate: 20 bpm Temperature: 36

.7 (C) / 

98.0 (F)                                Weight: 224 lbs 

 

                2012      Blood Pressure 1: 138/72 Code: 8480-6 BMI: 38.4 

Code: 22258-5 Heart 

Rate 1: 72 bpm      Height: 5'4"        Respiratory Rate: 20 bpm Temperature: 36

.6 (C) / 

97.9 (F)                                Weight: 224 lbs 

 

                2012      Blood Pressure 1: 122/78 Code: 8480-6 BMI: 38.8 

Code: 81156-9 Heart 

Rate 1: 88 bpm      Height: 5'4"        Respiratory Rate: 20 bpm Temperature: 36

.6 (C) / 

97.8 (F)                                Weight: 226 lbs 

 

                2012      Blood Pressure 1: 116/60 Code: 8480-6 BMI: 38.1 

Code: 89747-4 Heart 

Rate 1: 92 bpm      Height: 5'4"        Respiratory Rate: 20 bpm Temperature: 36

.8 (C) / 

98.2 (F)                                Weight: 222 lbs 

 

                2011      Blood Pressure 1: 118/62 Code: 8480-6 BMI: 37.9 

Code: 84622-2 Heart 

Rate 1: 80 bpm      Height: 5'4"        Temperature: 36.5 (C) / 97.7 (F) Weight:

 221 lbs 

 

                2011      Blood Pressure 1: 132/70 Code: 8480-6 Heart Rate

 1: 84 bpm 

Respiratory Rate: 20 bpm  Temperature: 36.7 (C) / 98.0 (F) Weight: 221 lbs 

 

                2011      Blood Pressure 1: 114/72 Code: 8480-6 BMI: 37.6 

Code: 70892-5 Heart 

Rate 1: 76 bpm      Height: 5'4"        Respiratory Rate: 20 bpm Temperature: 36

.8 (C) / 

98.3 (F)                                Weight: 219 lbs 

 

                    2011          Blood Pressure 1: 136/76 Code: 8480-6 Te

mperature: 36.0 (C) / 96.8 

(F)                                     Weight: 219 lbs 8 oz

 

                2011      Blood Pressure 1: 128/80 Code: 8480-6 Heart Rate

 1: 72 bpm 

Temperature: 36.3 (C) / 97.4 (F)        Weight: 218 lbs 

 

                2011      Blood Pressure 1: 126/70 Code: 8480-6 Heart Rate

 1: 72 bpm 

Temperature: 36.7 (C) / 98.0 (F)

 

                    2010          Blood Pressure 1: 126/78 Code: 8480-6 Te

mperature: 36.2 (C) / 97.1 

(F)                                     Weight: 217 lbs 8 oz

 

                2010      Blood Pressure 1: 116/70 Code: 8480-6 Heart Rate

 1: 72 bpm 

Temperature: 36.4 (C) / 97.6 (F)        Weight: 222 lbs 

 

                2010      Blood Pressure 1: 114/78 Code: 8480-6 Heart Rate

 1: 72 bpm 

Temperature: 37.1 (C) / 98.8 (F)        Weight: 225 lbs 

 

                2010      Blood Pressure 1: 128/78 Code: 8480-6 Heart Rate

 1: 76 bpm 

Temperature: 36.6 (C) / 97.8 (F)        Weight: 224 lbs 

 

                2010      Blood Pressure 1: 116/78 Code: 8480-6 Heart Rate

 1: 80 bpm 

Temperature: 36.4 (C) / 97.6 (F)        Weight: 226 lbs 

 

                2010      Blood Pressure 1: 120/70 Code: 8480-6 BMI: 38.3 

Code: 10946-0 Heart 

Rate 1: 88 bpm      Height: 5'5"        Temperature: 36.4 (C) / 97.6 (F) Weight:

 230 lbs 







Functional Status

No Functional Status data



Reason For Visit





                    Reason For Visit    Effective Dates     Notes

 

                    blood pressure check 2020           

 

                    high blood pressure 2020           

 

                    lab draw            2019           

 

                    lab draw            10/11/2019           

 

                    hearing loss        2019          left ear

 

                    follow up           06/10/2019           

 

                    follow up           2019          Patient has upcoming

 appointment with Dr Claire and carotid 

dopplers tomorrow

 

                    follow up           2018          Patient had heart ca

th on 10-30-18 and one of the bypass 

veins in spasming

 

                    follow up           2018          4 Week

 

                    follow up           10/02/2018           

 

                    follow up           2018           

 

                    high blood pressure 2018          Dr Claire has ordered

 holter monitor and 

hydralazine 50mg PRN

 

                    dizziness           2018          On  morning darren delvalle woke up and went to the bathroom 

and after lying down became very dizzy. Patient has hx of vertigo so didn't 
think anything of it. She usually just has them intermittently, but had more 
that day. While at [employer] began to feel very ill and became very shaky. She got 
home and sat in the recliner and had the jittery/nervous feeling. Later that 
night it finally resolved. Patient feels like she had a spell like this around 
the  and thought it was due to extreme heat exhaustion.

 

                    follow up           2018           

 

                    back pain           2018           

 

                    otalgia             2018           

 

                    back pain           2018           

 

                    injection(s)        10/06/2017          flu shot

 

                    lab draw            2017           

 

                    follow up           2017           

 

                    pelvic pain         2017          Patient states pain 

started in May with a "Constant Ache"

to left inguinal area. Patient states at that time pain was intermittent. Then, 
approximately 2nd week  of  pain worsened and radiates to left low back 
area.

 

                    lab draw            2017           

 

                    hyperglycemia       2017           

 

                    cough               2017           

 

                    otalgia             2016           

 

                    injection(s)        10/07/2016          Influenza

 

                    lab draw            2016           

 

                    constipation        2016           

 

                    flank pain          2016           

 

                    follow up           2015          3mo fwup

 

                    injection(s)        10/16/2015          Influenza

 

                    acute renal failure 09/15/2015           

 

                    lab draw            09/10/2015           

 

                    fatigue             2015           

 

                    otalgia             2015           

 

                    pain, limb          2015           

 

                    follow up           2015          [facility] fwup

 

                    high blood pressure 2015           

 

                    injection(s)        10/17/2014          flu shot

 

                    sinusitis           2014           

 

                    high blood pressure 2014           

 

                    cough               2014          Was panfilo at Dr Tyree teixeira's office so he started he on amlodipine 

10mg but seems to be dragging her down. Patient decreased to 1/2 tablet this 
last week

 

                    lab draw            2014           

 

                    cough               2014           

 

                    cough               10/15/2013           

 

                    high blood pressure 2013           

 

                    follow up           2013          ER

 

                    dizziness           2013           

 

                    follow up           2013           

 

                    otalgia             05/10/2013           

 

                    breast complaint    2013           

 

                    lab draw            2012           

 

                    follow up           2012          2mo fwup

 

                    follow up           10/23/2012          2wk fwup

 

                    gas and bloating    10/10/2012          Dr Claire has her mon

itoring because was high in his 

office at last visit

 

                    injection(s)        2012           

 

                    dizziness           2012           

 

                    dizziness           2012           

 

                    lab draw            2012           

 

                    muscle weakness     2012          concerns of simvasta

tin with fatigue and muscle 

weakness

 

                    leg pain/sciatica   2012           

 

                    hip pain            2012           

 

                    back pain           2012          has tried ice pack, 

muscle relaxers, and moist heat

 

                    back pain           2012           

 

                    fatigue             2012          in hands/feet

 

                    otalgia             2011           

 

                    follow up           2011          2wk fwup

 

                    back pain           2011          thinks ulcer may hav

e returned

 

                    lab draw            2011           

 

                    dizziness           2011          Left ear and facial 

pain, right ear pain 

 

                    follow up           2011          1wk fwup

 

                    hip pain            2011          feels very draggy, f

atigue, BP 80/63, normally running 

100's/60's

 

                    chills              2010           

 

                    injection(s)        10/06/2010          flu shot

 

                    follow up           2010          6wk fwup, had HH rep

aired and is starting to feel better, 

started on reglan 10mg tid

 

                    follow up           2010          hosp fwup

 

                    follow up           2010          1mo fwup, saw Dr Danna du and hasn't gave cardiac clearance 

yet for HH repair, did have chemical stress test yesterday and waiting on 
results

 

                    follow up           2010          from EGD/colonoscopy

--has Hiatal Hernia and wants to do 

repair

 

                    follow up           2010           







Encounters





             Encounter    Performer    Location     Codes        Date

 

                                        ([id number]) OFFICE/OUTPATIENT VISIT EST

Diagnosis: Essential hypertension[ICD10: I10]

Diagnosis: Palpitations[ICD10: R00.2]

Diagnosis: Stress reaction[ICD10: F43.0] [facility]            CPT-4: 73462              2020

 

                                        ([id number]) NURSE/OUTPATIENT VISIT EST

Diagnosis: Mixed hyperlipidemia[ICD10: E78.2] Akanksha DRIVER DO [facility]            CPT-4: 51720              2019

 

                                        ([id number]) NURSE/OUTPATIENT VISIT EST

Diagnosis: FLU VACCINE[ICD10: Z23] Akanksha KEY DO 

[facility]                       CPT-4: 44408              10/22/2019

 

                                        ([id number]) NURSE/OUTPATIENT VISIT EST

Diagnosis: Chronic kidney disease, stage 1[ICD10: N18.1] [facility] CPT-4: 45874              10/11/2019

 

                                        (75956) OFFICE/OUTPATIENT VISIT EST

Diagnosis: Acute serous otitis media, left ear[ICD10: H65.02] Nat DRIVER DO [facility] CPT-4: 64406              2019

 

                                        ([id number]) OFFICE/OUTPATIENT VISIT EST

Diagnosis: Essential (primary) hypertension[ICD10: I10]

Diagnosis: Coronary atherosclerosis due to calcified coronary lesion[ICD10: 
I25.84]

Diagnosis: Hypoglycemia, unspecified[ICD10: E16.2]

Diagnosis: Other fatigue[ICD10: R53.83]

Diagnosis: Urinary tract infection, site not specified[ICD10: N39.0] Akanksha DRIVER [facility] CPT-4: 98755        06/10/2019

 

                                        ([id number]) OFFICE/OUTPATIENT VISIT EST

Diagnosis: Essential (primary) hypertension[ICD10: I10]

Diagnosis: Gastro-esophageal reflux disease without esophagitis[ICD10: K21.9]

Diagnosis: Urinary tract infection, site not specified[ICD10: N39.0] [facility] CPT-4: 95126        2019

 

                                        ([id number]) OFFICE/OUTPATIENT VISIT EST

Diagnosis: Gastro-esophageal reflux disease without esophagitis[ICD10: K21.9]

Diagnosis: Epigastric pain[ICD10: R10.13] [facility]            CPT-4: 18719              2018

 

                                        (58191) OFFICE/OUTPATIENT VISIT EST

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

Diagnosis: Essential (primary) hypertension[ICD10: I10]

Diagnosis: Generalized anxiety disorder[ICD10: F41.1]

Diagnosis: Gastro-esophageal reflux disease without esophagitis[ICD10: K21.9] 

Akanksha DRIVER [facility] CPT-4: 12712        2018

 

                                        OFFICE/OUTPATIENT VISIT EST

Diagnosis: Gastro-esophageal reflux disease without esophagitis[ICD10: K21.9]

Diagnosis: Palpitations[ICD10: R00.2]

Diagnosis: Urinary tract infection, site not specified[ICD10: N39.0]

Diagnosis: Generalized anxiety disorder[ICD10: F41.1] Akanksha [facility]IVER [facility] CPT-4: 73386              10/02/2018

 

                                        ([id number]) NURSE/OUTPATIENT VISIT EST

Diagnosis: Coronary atherosclerosis due to calcified coronary lesion[ICD10: 
I25.84]

Diagnosis: Hypoglycemia, unspecified[ICD10: E16.2]

Diagnosis: Dizziness and giddiness[ICD10: R42]

Diagnosis: Occlusion and stenosis of bilateral carotid arteries[ICD10: I65.23] 

[facility]IVER [facility] CPT-4: 69667        2018

 

                                        OFFICE/OUTPATIENT VISIT EST

Diagnosis: Epigastric pain[ICD10: R10.13]

Diagnosis: Generalized anxiety disorder[ICD10: F41.1]

Diagnosis: FLU VACCINE[ICD10: Z23] Akanksha KEY [facility]                       CPT-4: 40623              2018

 

                                        (74502) OFFICE/OUTPATIENT VISIT EST

Diagnosis: Essential (primary) hypertension[ICD10: I10]

Diagnosis: Hypoglycemia, unspecified[ICD10: E16.2]

Diagnosis: Gastro-esophageal reflux disease without esophagitis[ICD10: K21.9] 

Akanksha DRIVER [facility] CPT-4: 51902        2018

 

                                        ([id number]) OFFICE/OUTPATIENT VISIT EST

Diagnosis: Dizziness and giddiness[ICD10: R42] Nat DRIVER [employer] [facility]            CPT-4: 81845              2018

 

                                        (31003) OFFICE/OUTPATIENT VISIT EST

Diagnosis: Cervicalgia[ICD10: M54.2]

Diagnosis: Other spondylosis with radiculopathy, cervical region[ICD10: M47.22] 

Akanksha Otoolendyesi DRIVER [employer] [facility] CPT-4: 46180        2018

 

                                        (30898) OFFICE/OUTPATIENT VISIT EST

Diagnosis: Hypoglycemia, unspecified[ICD10: E16.2]

Diagnosis: Other spondylosis with radiculopathy, cervical region[ICD10: M47.22]

Diagnosis: Vertigo of central origin, bilateral[ICD10: H81.43]

Diagnosis: Cervicocranial syndrome[ICD10: M53.0] Akanksha Villa DRIVER [employer] [facility]         CPT-4: 06805              2018

 

                                        (24699) OFFICE/OUTPATIENT VISIT EST

Diagnosis: Benign paroxysmal vertigo, bilateral[ICD10: H81.13]

Diagnosis: Otalgia, bilateral[ICD10: H92.03] Nat DRIVER [employer] [facility]            CPT-4: 22882              2018

 

                                        (83781) OFFICE/OUTPATIENT VISIT EST

Diagnosis: Low back pain[ICD10: M54.5]

Diagnosis: Radiculopathy, lumbosacral region[ICD10: M54.17]

Diagnosis: Left lower quadrant pain[ICD10: R10.32] Akanksha Driver        DORITA IZQUIERDOMARCY

OLIVIA SPENCER [employer] [facility]         CPT-4: 52452              2018

 

                                        (70800) OFFICE/OUTPATIENT VISIT EST

Diagnosis: FLU VACCINE[ICD10: Z23] Akanksha Xiangrodo        AKANKSHA OLIVIA SPENCER

 [employer] 

[facility]                       CPT-4: 99174              10/06/2017

 

                                        (75996) OFFICE/OUTPATIENT VISIT EST

Diagnosis: Mixed hyperlipidemia[ICD10: E78.2]

Diagnosis: Essential (primary) hypertension[ICD10: I10]

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

Diagnosis: Impaired fasting glucose[ICD10: R73.01]

Diagnosis: Other fatigue[ICD10: R53.83] Akanksha DRIVER

[facility]                    CPT-4: 41974              2017

 

                                        (15986) OFFICE/OUTPATIENT VISIT EST

Diagnosis: Pain in thoracic spine[ICD10: M54.6]

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 

Akanksha DRIVER [facility] CPT-4: 07689        2017

 

                                        (97824) OFFICE/OUTPATIENT VISIT EST

Diagnosis: Left lower quadrant pain[ICD10: R10.32]

Diagnosis: Low back pain[ICD10: M54.5] Akanksha SYKES 

[facility]                    CPT-4: 89409              2017

 

                                        (54860) OFFICE/OUTPATIENT VISIT EST

Diagnosis: Mixed hyperlipidemia[ICD10: E78.2]

Diagnosis: Essential (primary) hypertension[ICD10: I10]

Diagnosis: Hypoglycemia, unspecified[ICD10: E16.2] [facility]         CPT-[facility]: 18698              2017

 

                                        (79219) OFFICE/OUTPATIENT VISIT EST

Diagnosis: Impaired fasting glucose[ICD10: R73.01]

Diagnosis: Mastodynia[ICD10: N64.4]

Diagnosis: Mixed hyperlipidemia[ICD10: E78.2]

Diagnosis: Essential (primary) hypertension[ICD10: I10]

Diagnosis: Other fatigue[ICD10: R53.83] Akanksha DRIVER

[facility]                    CPT-4: 18195              2017

 

                                        (15373) OFFICE/OUTPATIENT VISIT EST

Diagnosis: Acute upper respiratory infection, unspecified[ICD10: J06.9] Luba Stevenson              AKANKSHA DRIVER [facility] CPT-4: 91328        2017

 

                                        (89868) OFFICE/OUTPATIENT VISIT EST

Diagnosis: Acute mastoiditis without complications, right ear[ICD10: H70.001] 

[facility] 21432        2016

 

                                        (17896) OFFICE/OUTPATIENT VISIT EST

Diagnosis: FLU VACCINE[ICD10: Z23] Akanksha KEY [facility]                       CPT-4: 20912              10/07/2016

 

                                        (50345) OFFICE/OUTPATIENT VISIT EST

Diagnosis: Mixed hyperlipidemia[ICD10: E78.2]

Diagnosis: Essential (primary) hypertension[ICD10: I10]

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

Diagnosis: Impaired fasting glucose[ICD10: R73.01] Akanksha DRIVER DO [facility]         CPT-4: 18705              2016

 

                                        (94815) OFFICE/OUTPATIENT VISIT EST

Diagnosis: Constipation, unspecified[ICD10: K59.00] Akanksha DRIVER DO [facility] CPT-4: 02028              2016

 

                                        (30510) OFFICE/OUTPATIENT VISIT EST

Diagnosis: Essential (primary) hypertension[ICD10: I10]

Diagnosis: Mixed hyperlipidemia[ICD10: E78.2]

Diagnosis: Chronic kidney disease, stage 1[ICD10: N18.1] Akanksha DRIVER [facility] CPT-4: 48598              2016

 

                                        (66440) OFFICE/OUTPATIENT VISIT EST

Diagnosis: Muscle weakness (generalized)[ICD10: M62.81]

Diagnosis: Dizziness and giddiness[ICD10: R42]

Diagnosis: Essential (primary) hypertension[ICD10: I10]

Diagnosis: History of falling[ICD10: Z91.81] Aaknksha Otoolerodo        [place] DRIVER [facility]            CPT-4: 08057              2015

 

                                        (41268) OFFICE/OUTPATIENT VISIT EST

Diagnosis: FLU VACCINE[ICD10: Z23] Akanksha KEY [facility]                       CPT-4: 20087              10/16/2015

 

                                        (54255) OFFICE/OUTPATIENT VISIT EST

Diagnosis: Acute renal failure[ICD9: 584.9]

Diagnosis: POLYURIA[ICD9: 788.42] Akanksha Otoolendyesi HIGHTOWERLINE OLIVIA LANCE [facility]                       CPT-4: 84220              09/15/2015

 

                                        (58462) OFFICE/OUTPATIENT VISIT EST

Diagnosis: HYPERLIPIDEMIA NEC/NOS[ICD9: 272.4]

Diagnosis: HYPERTENSION[ICD9: 401.9]

Diagnosis: CAD[ICD9: 414.00]

Diagnosis: Hyperglycemia[ICD9: 790.29]

Diagnosis: MALAISE AND FATIGUE[ICD9: 780.79] Akanksha Otoolerodo        [place] DRIVER [employer] [facility]            CPT-4: 91185              09/10/2015

 

                                        (63671) OFFICE/OUTPATIENT VISIT EST

Diagnosis: MALAISE AND FATIGUE[ICD9: 780.79]

Diagnosis: HYPOGLYCEMIA[ICD9: 251.2]

Diagnosis: Grieving[ICD9: 309.0]

Diagnosis: ABDOMINAL PAIN[ICD9: 789.00] Akanksha HIGHTOWERLINE SAramis 

VILLA

[facility]                    CPT-4: 31895              2015

 

                                        OFFICE/OUTPATIENT VISIT EST

Diagnosis: CERUMEN IMPACTION[ICD9: 380.4]

Diagnosis: EUSTACHIAN TUBE DYSFUNCTION[ICD9: 381.81] Bertha Elieser      

AKANKSHA OLIVIA DRIVER [employer] [facility] CPT-4: 03948              2015

 

                                        (14405) OFFICE/OUTPATIENT VISIT EST

Diagnosis: Leg pain[ICD9: 729.5]

Diagnosis: SUPERFIC PHLEBITIS-LEG[ICD9: 451.0] Akanksha Xiangrodo ROBERTSON SAramis 

[facility] [employer] [facility]            CPT-4: 59402              2015

 

                                        (76140) OFFICE/OUTPATIENT VISIT EST

Diagnosis: Thoracic back pain[ICD9: 724.1]

Diagnosis: SPASM OF MUSCLE[ICD9: 728.85] Akanksha Xiangndyesi        AKANKSHA SAramis

 

[facility]            CPT-4: 11955              2015

 

                                        (31235) OFFICE/OUTPATIENT VISIT EST

Diagnosis: DIZZINESS/VERTIGO[ICD9: 780.4]

Diagnosis: Benign positional vertigo[ICD9: 386.11]

Diagnosis: HYPERTENSION[ICD9: 401.9]

Diagnosis: Suspicious nevus[ICD9: 238.2] Akanksha Xiangrodo BAUMAN SAramis

 

[facility]            CPT-4: 88575              2015

 

                                        (90626) OFFICE/OUTPATIENT VISIT EST

Diagnosis: FLU VACCINE[ICD10: Z23] Akanksha BAUMAN SAramis OREND

[facility]                       CPT-4: 60625              10/17/2014

 

                                        OFFICE/OUTPATIENT VISIT EST

Diagnosis: SINUSITIS, ACUTE[ICD9: 461.9]

Diagnosis: EUSTACHIAN TUBE DYSFUNCTION[ICD9: 381.81] [facility] CPT-[facility]: 77740              2014

 

                                        (96016) OFFICE/OUTPATIENT VISIT EST

Diagnosis: DIZZINESS/VERTIGO[ICD9: 780.4]

Diagnosis: HYPERTENSION[ICD9: 401.9] Akanksha OTOOLE

[facility]                       CPT-4: 56011              2014

 

                                        (78818) OFFICE/OUTPATIENT VISIT EST

Diagnosis: HYPERTENSION[ICD9: 401.9]

Diagnosis: MALAISE AND FATIGUE[ICD9: 780.79]

Diagnosis: SINUSITIS, ACUTE[ICD9: 461.9] [facility]            CPT-4: 68649              2014

 

                                        (58209) OFFICE/OUTPATIENT VISIT EST

Diagnosis: HYPERLIPIDEMIA NEC/NOS[ICD9: 272.4]

Diagnosis: HYPERTENSION[ICD9: 401.9]

Diagnosis: B12 DEFIC ANEMIA NEC[ICD9: 281.1]

Diagnosis: HYPOGLYCEMIA[ICD9: 251.2] Akanksha OTOOLE

[facility]                       CPT-4: 27646              2014

 

                                        OFFICE/OUTPATIENT VISIT EST

Diagnosis: COUGH[ICD9: 786.2] [facility] 

CPT-4: 22993                            2014

 

                                        OFFICE/OUTPATIENT VISIT EST

Diagnosis: COUGH[ICD9: 786.2]

Diagnosis: URI, ACUTE[ICD9: 465.9] Bertha SPENCER

[facility]                       CPT-4: 90408              10/15/2013

 

                                        (62019) OFFICE/OUTPATIENT VISIT EST

Diagnosis: HYPERTENSION[ICD9: 401.9]

Diagnosis: CEPHALGIA[ICD9: 784.0]

Diagnosis: ANXIETY STATE NOS[ICD9: 300.00]

Diagnosis: FLU VACCINE[ICD9: V04.81] Akanksha OTOOLE

[facility]                       CPT-4: 10982              2013

 

                                        (95484) OFFICE/OUTPATIENT VISIT EST

Diagnosis: DIZZINESS/VERTIGO[ICD9: 780.4]

Diagnosis: SINUSITIS, ACUTE[ICD9: 461.9]

Diagnosis: Benign positional vertigo[ICD9: 386.11] Akanksha DE LA ROSA

SAramis VILLA [facility]         CPT-4: 70724              2013

 

                                        OFFICE/OUTPATIENT VISIT EST

Diagnosis: OTITIS MEDIA NOS[ICD9: 382.9] Akanksha BAUMAN SAramis

 

[facility]            CPT-4: 00630              2013

 

                                        OFFICE/OUTPATIENT VISIT EST

Diagnosis: Perforation of ear drum[ICD9: 384.20]

Diagnosis: SINUSITIS, ACUTE[ICD9: 461.9] Akanksha BAUMAN SAramis

 

XIANGNDYESI [facility]            CPT-4: 16223              05/10/2013

 

                                        (49593) OFFICE/OUTPATIENT VISIT EST

Diagnosis: Mastalgia[ICD9: 611.71] Akanksha BAUMAN SAramis XIANGND

[facility]                       CPT-4: 54890              2013

 

                                        (16878) OFFICE/OUTPATIENT VISIT EST

Diagnosis: HYPERLIPIDEMIA NEC/NOS[ICD9: 272.4]

Diagnosis: HYPERTENSION[ICD9: 401.9]

Diagnosis: DIZZINESS/VERTIGO[ICD9: 780.4]

Diagnosis: Hyperglycemia[ICD9: 790.29]

Diagnosis: MALAISE AND FATIGUE[ICD9: 780.79] Akanksha TEIXEIRA SAramis 

[facility]            CPT-4: 94911              2012

 

                                        (79979) OFFICE/OUTPATIENT VISIT EST

Diagnosis: EUSTACHIAN TUBE DYSFUNCTION[ICD9: 381.81]

Diagnosis: DIZZINESS/VERTIGO[ICD9: 780.4]

Diagnosis: ABDOMINAL PAIN[ICD9: 789.00]

Diagnosis: GERD[ICD9: 530.81]

Diagnosis: CERUMEN IMPACTION[ICD9: 380.4] Akanksha BAUMAN S

Aramis 

[facility]            CPT-4: 86659              2012

 

                                        OFFICE/OUTPATIENT VISIT EST

Diagnosis: ABDOMINAL PAIN[ICD9: 789.00]

Diagnosis: DYSPEPSIA[ICD9: 536.8] Akanksha MASTERS

[facility]                       CPT-4: 37525              10/23/2012

 

                                        (58746) OFFICE/OUTPATIENT VISIT EST

Diagnosis: ABDOMINAL PAIN[ICD9: 789.00]

Diagnosis: DYSPEPSIA[ICD9: 536.8]

Diagnosis: IBS[ICD9: 564.1] Akanksha DRIVER [facility] CPT

-

4: 22954                                10/10/2012

 

                                        OFFICE/OUTPATIENT VISIT EST

Diagnosis: DIZZINESS/VERTIGO[ICD9: 780.4]

Diagnosis: HYPOGLYCEMIA[ICD9: 251.2] Akanksha MEJIA [facility]                       CPT-4: 67303              2012

 

                                        (60838) OFFICE/OUTPATIENT VISIT EST

Diagnosis: URINARY TRACT INFECTION[ICD9: 599.0] [facility]            CPT-4: 16351              2012

 

                                        (08309) OFFICE/OUTPATIENT VISIT EST

Diagnosis: HYPERLIPIDEMIA NEC/NOS[ICD9: 272.4]

Diagnosis: HYPERTENSION[ICD9: 401.9]

Diagnosis: MALAISE AND FATIGUE[ICD9: 780.79]

Diagnosis: DIZZINESS/VERTIGO[ICD9: 780.4] Akanksha [facility]            CPT-4: 59763              2012

 

                                        (67793) OFFICE/OUTPATIENT VISIT EST

Diagnosis: DIZZINESS/VERTIGO[ICD9: 780.4]

Diagnosis: MALAISE AND FATIGUE[ICD9: 780.79]

Diagnosis: HYPERTENSION[ICD9: 401.9] Akanksha MEJIA [facility]                       CPT-4: 24458              2012

 

                                        OFFICE/OUTPATIENT VISIT EST

Diagnosis: LUMB/LUMBOSAC DISC DEGEN[ICD9: 722.52]

Diagnosis: Radiculopathy of leg[ICD9: 724.4]

Diagnosis: SACROILIITIS NEC[ICD9: 720.2]

Diagnosis: SPINAL ENTHESOPATHY[ICD9: 720.1] Akanksha DRIVER [facility]            CPT-4: 76743              2012

 

                                        (27789) OFFICE/OUTPATIENT VISIT EST

Diagnosis: PAIN, LOWER BACK[ICD9: 724.2]

Diagnosis: SPASM OF MUSCLE[ICD9: 728.85]

Diagnosis: SCIATICA[ICD9: 724.3]

Diagnosis: Lumbar degenerative disc disease[ICD9: 722.52] Akanksha DRIVER [facility] CPT-4: 72611              2012

 

                                        (86673) OFFICE/OUTPATIENT VISIT EST

Diagnosis: PAIN IN THORACIC SPINE[ICD9: 724.1]

Diagnosis: SPASM OF MUSCLE[ICD9: 728.85] Akanksha DRIVER [facility]            CPT-4: 88655              2012

 

                                        (77980) OFFICE/OUTPATIENT VISIT EST

Diagnosis: PAIN, LOWER BACK[ICD9: 724.2]

Diagnosis: SPASM OF MUSCLE[ICD9: 728.85]

Diagnosis: Sacroiliac dysfunction[ICD9: 739.4]

Diagnosis: Lumbar degenerative disc disease[ICD9: 722.52] Akanksha CHEATHAMAramis VILLA [facility] CPT-4: 77394              2012

 

                                        OFFICE/OUTPATIENT VISIT EST

Diagnosis: MALAISE AND FATIGUE[ICD9: 780.79]

Diagnosis: HYPOTENSION[ICD9: 458.9]

Diagnosis: CAD[ICD9: 414.00] Akanksha DRIVER [facility] 

CPT-4: 53029                            2012

 

                                        OFFICE/OUTPATIENT VISIT EST

Diagnosis: SINUSITIS, ACUTE[ICD9: 461.9]

Diagnosis: PHARYNGITIS, ACUTE[ICD9: 462]

Diagnosis: CERUMEN IMPACTION[ICD9: 380.4] Akanksha [facility]            CPT-4: 81654              2011

 

                                        OFFICE/OUTPATIENT VISIT EST

Diagnosis: PAIN IN THORACIC SPINE[ICD9: 724.1]

Diagnosis: GERD[ICD9: 530.81]

Diagnosis: DIZZINESS/VERTIGO[ICD9: 780.4] Akanksha CHEATHAM

Aramis 

[facility]            CPT-4: 28931              2011

 

                OFFICE/OUTPATIENT VISIT EST Akanksha Xiangndyesi AKANKSHA SAramis XIANG MEJIA [facility] CPT-

4: 09572                                2011

 

                    (05974) OFFICE/OUTPATIENT VISIT EST Akanksha OTOOLENDER  

[facility]                       CPT-4: 82190              2011

 

                                        (31742) OFFICE/OUTPATIENT VISIT, EST

Diagnosis: PAIN, LOWER BACK[ICD9: 724.2] Akanksha [facility]TOOLENDER  [facility]            CPT-4: 41901              2011

 

                    (70825) OFFICE/OUTPATIENT VISIT, EST Akanksha OTOOLENDER DO

[facility]                       CPT-4: 05059              2011

 

                    (22673) OFFICE/OUTPATIENT VISIT, EST Akanksha OTOOLENDER DO

[facility]                       CPT-4: 76239              2010

 

                    (58859) OFFICE/OUTPATIENT VISIT, EST Akanksha OTOOLENDER DO

[facility]                       CPT-4: 02233              2010

 

                    (86472) OFFICE/OUTPATIENT VISIT, EST Akanksha OTOOLENDER DO

[facility]                       CPT-4: 73059              2010

 

                    (91961) OFFICE/OUTPATIENT VISIT, EST Akanksha SPENCERER DO

[facility]                       CPT-4: 70488              2010

 

                    (38841) OFFICE/OUTPATIENT VISIT, EST Akanksha DE LA ROSA S. XIANGNDER DO

[facility]                       CPT-4: 23906              2010

 

                    (80609) OFFICE/OUTPATIENT VISIT, EST Akanksha OTOOLENDER 

[facility]                       CPT-4: 76733              2010







Plan of Care





             Planned Activity Notes        Codes        Status       Date

 

                          Visit Diagnosis Plan: Palpitations Discussion: Check C

BC, CMP, TSH

                                        ICD-9 : 785.1

ICD-10 : R00.2

                                                    2020

 

                          Visit Diagnosis Plan: Essential hypertension Discussio

n: Increase metoprolol to 

25mg q AM and 50mg po q pM Recheck 1 week

                                        ICD-9 : 401.9

ICD-10 : I10

                                                    2020

 

                          Patient Education: metoprolol tartrate- [employer] Cou

joce [id number] 

https://www.[employer]/samplemd/resources/getResource/61/[email or web address]

                                        Completed           2020

 

                    Care Plan: X-RAY EXAM NECK SPINE 4/5VWS                     

LOINC : 20480-7

                          Pending                   2020

 

                    Care Plan: X-RAY EXAM THORAC SPINE4/>VW                     

LOINC : 83622-5

                          Pending                   2020

 

                                        Appointment: Akanksha Driver

WPtel:[phone]

                                        [address] [place] [address]

[place]                                              LAB             2019

 

                                        Appointment: Akanksha Driver

WPtel:[phone]

                                        [address] [place] [address]

[place]                                              INJECTION       10/22/2019

 

             Patient Education: INFLUENZA VACCINE [facility]                           

Completed    10/22/2019

 

                                        Appointment: Akanksha Driver

WPtel:[phone]

                                        [address] [place] [address]

[place]                                              LAB             10/11/2019

 

                          Visit Diagnosis Plan: Acute serous otitis media, left 

ear Discussion: instructed

to use flonase and claritin daily for symptom relief. discussed with patient 
that if no improvement in 2 weeks, will need to follow up with ENT for audiology
exam. instructed to call office with any other symptoms such as otalgia, 
dysphagia, fever, etc.

                                        ICD-9 : 381.01

ICD-10 : H65.02

                                                    2019

 

                                        Appointment: Nat Lozoya

                                        [address] [place] [address]                                              ACUTE ILLNESS   2019

 

                          Visit Diagnosis Plan: Urinary tract infection, site no

t specified Discussion: 

Started an old abx prescription

                                        ICD-9 : 599.0

ICD-10 : N39.0

                                                    06/10/2019

 

                          Visit Diagnosis Plan: Hypoglycemia, unspecified Discus

madeleine: Monitor BS At least 

6 small meals a day each with protein

                                        ICD-9 : 251.2

ICD-10 : E16.2

                                                    06/10/2019

 

                          Visit Diagnosis Plan: Essential (primary) hypertension

 Discussion: Stable Check 

CMP

                                        ICD-9 : 401.9

ICD-10 : I10

                                                    06/10/2019

 

                          Visit Diagnosis Plan: Other fatigue Discussion: Check 

CBC, TSH

                                        ICD-9 : 780.79

ICD-10 : R53.83

                                                    06/10/2019

 

                                        Appointment: Akanksha Driver

WPtel:[phone]

                                        [address] [place], [place] [id number]

[place]                                              FOLLOW UP       06/10/2019

 

                          Visit Diagnosis Plan: Essential (primary) hypertension

 Discussion: Stable Sees 

Dr. Clements this month

                                        ICD-9 : 401.9

ICD-10 : I10

                                                    2019

 

                          Visit Diagnosis Plan: Urinary tract infection, site no

t specified Discussion: 

Macrobid 100mg po BID for 1 week

                                        ICD-9 : 599.0

ICD-10 : N39.0

                                                    2019

 

                          Visit Diagnosis Plan: Gastro-esophageal reflux disease

 without esophagitis 

Discussion: Stable on omeprazole

Follow Up: 3 months

                                        ICD-9 : 530.81

ICD-10 : K21.9

                                                    2019

 

                                        Appointment: Akanksha Driver

WPtel:[phone]

                                        [address] [place], [place] [id number]

[place]                                              FOLLOW UP       2019

 

                          Patient Education: metoprolol tartrate- [employer] [facility] [id number] 

https://www.[employer]/resources/getResource/61/498o6v36-4mvc-76zw-86

                                        Completed           2019

 

                                        Appointment: Akanksha Driver

WPtel:[phone]

                                        [address] [place], [place] [id number]

[place]                                              CANCELED        02/15/2019

 

                          Visit Diagnosis Plan: Gastro-esophageal reflux disease

 without esophagitis 

Discussion: Continue protonix and carafate Proceed with updated EGD

                                        ICD-9 : 530.81

ICD-10 : K21.9

                                                    2018

 

                          Visit Diagnosis Plan: Epigastric pain Discussion: [facility]

k CT abdomen/pelvis due to

ongoing abdominal pain

                                        ICD-9 : 789.06

ICD-10 : R10.13

                                                    2018

 

                                        Appointment: Akanksha Driver

WPtel:[phone]

                                        [address] [place], [place] [id number]

[place]                                              FOLLOW UP       2018

 

                    Care Plan: CT PELVIS W/O DYE                     LOINC : 361

08-9

                          Pending                   2018

 

                    Care Plan: CT ABDOMEN W/O DYE                     LOINC : 36

103-0

                          Pending                   2018

 

                    Care Plan: Referral Order                     SNOMED-CT : 30

[id number]

                          Pending                   2018

 

                                        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

art disease of native coronary artery 

without angina pectoris                 Discussion: S/P cardiac cath--doing medi

vicki management 

with imdur and metoprolol increased Has fwup with cardiology next week Discussed
cardiac rehab

                                        ICD-9 : 414.00

ICD-10 : I25.10

                                                    2018

 

                          Visit Diagnosis Plan: Generalized anxiety disorder Dis

cussion: Stable

                                        ICD-9 : 300.00

ICD-10 : F41.1

                                                    2018

 

                          Visit Diagnosis Plan: Gastro-esophageal reflux disease

 without esophagitis 

Discussion: Continue protonix at BID dosing and carafate BID

Follow Up: 2 months

                                        ICD-9 : 530.81

ICD-10 : K21.9

                                                    2018

 

                          Visit Diagnosis Plan: Essential (primary) hypertension

 Discussion: Stable

                                        ICD-9 : 401.9

ICD-10 : I10

                                                    2018

 

                                        Appointment: Akanksha Drivertel:[phone]

                                        [address] [place], [place] [id number]

[place]                                              FOLLOW UP       2018

 

                          Visit Diagnosis Plan: Gastro-esophageal reflux disease

 without esophagitis 

Discussion: Continue protonix at BID dosing Continue carafate at q AC and HS 
dosing for 2 more weeks then decrease to BID dosing

Follow Up: 4 weeks

                                        ICD-9 : 530.81

ICD-10 : K21.9

                                                    10/02/2018

 

                          Visit Diagnosis Plan: Urinary tract infection, site no

t specified Discussion: 

Reculture urine

                                        ICD-9 : 599.0

ICD-10 : N39.0

                                                    10/02/2018

 

                          Visit Diagnosis Plan: Generalized anxiety disorder Dis

cussion: Increase xanax to

BID dosing especially with upcoming cardiac testing which is already making 
patient more anxious and worried

                                        ICD-9 : 300.00

ICD-10 : F41.1

                                                    10/02/2018

 

                          Visit Diagnosis Plan: Palpitations Discussion: Cardilo

logy going to schedule 

Lexiscan

                                        ICD-9 : 785.1

ICD-10 : R00.2

                                                    10/02/2018

 

                                        Appointment: Akanksha Driverl:[phone]

                                        [address] [place], [place] [address]

[place]                                              FOLLOW UP       10/02/2018

 

             Patient Education: Patient Medication Summary                      

     Completed    10/02/2018

 

                                        Appointment: Akanksha Drivertel:[phone]

                                        [address] [place], [place] [id number]

[place]                                              LAB             2018

 

             Patient Education: Patient Medication Summary                      

     Completed    2018

 

                          Visit Diagnosis Plan: Epigastric pain Discussion: Cont

inue protonix and carafate

but make into a slurry Flu shot given Discussed EGD if symptoms persist

Follow Up: 2 weeks

                                        ICD-9 : 789.06

ICD-10 : R10.13

                                                    2018

 

                          Visit Diagnosis Plan: Generalized anxiety disorder Dis

cussion: Did discuss 

increased risk of benzodiazepines causing dementia but at this time will stay at
xanax 0.25mg po BID

                                        ICD-9 : 300.00

ICD-10 : F41.1

                                                    2018

 

                                        Appointment: Akanksha Driver

WPtel:[phone]

                                        [address] [place], [place] [id number]

                                              FOLLOW UP       2018

 

             Patient Education: Patient Medication Summary                      

     Completed    2018

 

                          Visit Diagnosis Plan: Essential (primary) hypertension

 Discussion: Has 

hydralazine to use prn per cardiology Going to do 30 day holter monitor

                                        ICD-9 : 401.9

ICD-10 : I10

                                                    2018

 

                          Visit Diagnosis Plan: Hypoglycemia, unspecified Discus

madeleine: 6 small meals a 

day--each with protein Increase fluid intake

                                        ICD-9 : 251.2

ICD-10 : E16.2

                                                    2018

 

                          Visit Diagnosis Plan: Gastro-esophageal reflux disease

 without esophagitis 

Discussion: Change to protonix 40mg po BID

Follow Up: 1 months

                                        ICD-9 : 530.81

ICD-10 : K21.9

                                                    2018

 

                                        Appointment: Akanksha Driver

WPtel:[phone]

                                        [address] [place], [place] [id number]

                                              ACUTE ILLNESS   2018

 

             Patient Education: Patient Medication Summary                      

     Completed    2018

 

                          Visit Diagnosis Plan: Dizziness and giddiness Discussi

on: blood work to be 

completed in office including cmp, cbc, troponin to rule out glucose 
intolerance, infection, dehydration. instructed patient that she needs to see 
her cardiologist sooner than oct and patient requested we contact dr. clements's 
office. will have front office make appt. patient has carotid doppler annually.

                                        ICD-9 : 780.4

ICD-10 : R42

                                                    2018

 

                                        Appointment: [address]

[id number]

                                              ACUTE ILLNESS   2018

 

             Patient Education: Patient Medication Summary                      

     Completed    2018

 

                          Visit Diagnosis Plan: Cervicalgia Discussion: Complete

 course of PT since 

symptoms improved Continue stretching exercises Notify if symptoms return or 
worsen

                                        ICD-9 : 723.1

ICD-10 : M54.2

                                                    2018

 

                                        Appointment: Akanksha Driver

WPtel:[phone]

                                        [facility] [place] [place]

                                              FOLLOW UP       2018

 

             Patient Education: Patient Medication Summary                      

     Completed    2018

 

                          Visit Diagnosis Plan: Hypoglycemia, unspecified Discus

madeleine: Eat something with 

protein every 2 hrs

                                        ICD-9 : 251.2

ICD-10 : E16.2

                                                    2018

 

                          Visit Diagnosis Plan: Other spondylosis with radiculop

athy, cervical region 

Discussion: Check MRI of cervical spine

                                        ICD-9 : 721.0

ICD-10 : M47.22

                                                    2018

 

                          Visit Diagnosis Plan: Vertigo of central origin, bilat

eral Discussion: Discussed

PT for vestibular therapy

                                        ICD-9 : 386.2

ICD-10 : H81.43

                                                    2018

 

                                        Appointment: Akanksha Driver

WPtel:[phone]

                                        [facility] [place] [facility] [place]                                                              2018

 

             Patient Education: Patient Medication Summary                      

     Completed    2018

 

                    Care Plan: MRI NECK SPINE W/O DYE                     LOINC 

: 16204-2

                          Pending                   2018

 

                          Visit Diagnosis Plan: Otalgia, bilateral Discussion: k

enalog 40 mg given to 

patient im. instructed patient to monitor blood sugar at home due to risk of 
increased sugar. instructed patient to rtc on wednesday if no improvement and 
may require antibiotic.

                                        ICD-9 : 388.70

ICD-10 : H92.03

                                                    2018

 

                          Visit Diagnosis Plan: Benign paroxysmal vertigo, bilat

eral Discussion: patient 

has meclizine at home but states it makes her too tired. instructed patient to 
cut in half and take at night. if it doesn't cause too much drowsiness, 
instructed to take bid until feeling better. instructed to rtc wed if no 
improvement or worsening symptoms. instructed patient to increase fluid intake 
as well.

                                        ICD-9 : 386.11

ICD-10 : H81.13

                                                    2018

 

                                        Appointment: Nat Lozoya

                                        [address] [place] [address]                                              ACUTE ILLNESS   2018

 

             Patient Education: Patient Medication Summary                      

     Completed    2018

 

                          Visit Diagnosis Plan: Left lower quadrant pain Discuss

ion: Culture urine Notify 

if worsens

                                        ICD-9 : 789.04

ICD-10 : R10.32

                                                    2018

 

                          Visit Diagnosis Plan: Low back pain Discussion: Stretc

hes Topical aspercreme 

Tylenol 1 po TID

                                        ICD-9 : 724.2

ICD-10 : M54.5

                                                    2018

 

                          Visit Diagnosis Plan: Radiculopathy, lumbosacral regio

n Discussion: As above

                                        ICD-9 : 724.4

ICD-10 : M54.17

                                                    2018

 

                                        Appointment: Akanksha Driver

WPtel:[phone]

                                        [address] [place] [address]                                              ACUTE ILLNESS   2018

 

             Patient Education: Patient Medication Summary                      

     Completed    2018

 

                                        Appointment: Akanksha Drvier

WPtel:[phone]

                                        [address] [place] [address]

[place]                                              INJECTION       10/06/2017

 

             Patient Education: Patient Medication Summary                      

     Completed    10/06/2017

 

                                        Appointment: Akanksha Driver

WPtel:[phone]

                                        [address] [place] [address]

[place]                                              LAB             2017

 

             Patient Education: Patient Medication Summary                      

     Completed    2017

 

                          Visit Diagnosis Plan: Pain in thoracic spine Discussio

n: PT has helped Continue 

stretches and walking [facility]ed [facility]

                                        ICD-9 : 724.1

ICD-10 : M54.6

                                                    2017

 

                          Visit Diagnosis Plan: Other intervertebral disc degene

ration, lumbar region 

Follow Up: 3 months

                                        ICD-9 : 722.52

ICD-10 : M51.36

                                                    2017

 

                                        Appointment: Akanksha Driver

WPtel:[phone]

                                        [address] [place] [address]                                              FOLLOW UP       2017

 

             Patient Education: Patient Medication Summary                      

     Completed    2017

 

                          Visit Diagnosis Plan: Low back pain Discussion: Start 

PT for low back- Seems 

like abdominal pain is radiating from low back

Follow Up: 5 weeks

                                        ICD-9 : 724.2

ICD-10 : M54.5

                                                    2017

 

                          Visit Diagnosis Plan: Left lower quadrant pain Discuss

ion: Had CT scan of 

abdomen/pelvis in 2017 and normal colonoscopy in 2015

                                        ICD-9 : 789.04

ICD-10 : R10.32

                                                    2017

 

                                        Appointment: Akanksha Driver

WPtel:[phone]

                                        [address] [place], [place] [address]

                                              ACUTE ILLNESS   2017

 

             Patient Education: Patient Medication Summary                      

     Completed    2017

 

                                        Appointment: Akanksha Driver

WPtel:[phone]

                                        [address] [place], [place] [address]

                                              LAB             2017

 

             Patient Education: Patient Medication Summary                      

     Completed    2017

 

                          Visit Diagnosis Plan: Mixed hyperlipidemia Discussion:

 Check lipids

                                        ICD-9 : 272.4

ICD-10 : E78.2

                                                    2017

 

                          Visit Diagnosis Plan: Impaired fasting glucose Discuss

ion: Return in AM for CMP,

HbA1C Accuchecks daily Diet/Exercise discussed at length

                                        ICD-9 : 790.29

ICD-10 : R73.01

                                                    2017

 

                          Visit Diagnosis Plan: Other fatigue Discussion: Check 

CBC, TSH

                                        ICD-9 : 780.79

ICD-10 : R53.83

                                                    2017

 

                          Visit Diagnosis Plan: Mastodynia Discussion: Check Jace

ateral diagnostic 

mammogram with US

                                        ICD-9 : 611.71

ICD-10 : N64.4

                                                    2017

 

                                        Appointment: Akanksha Driver

WPtel:[phone]

                                        [address] [place], [place] [address]

[place]              3/7 confirmed `sl                 ACUTE ILLNESS   2017

 

             Patient Education: Patient Medication Summary                      

     Completed    2017

 

                    Care Plan: MAMMOGRAM SCREENING                     LOINC : 2

6347-5

                          Pending                   2017

 

                          Visit Diagnosis Plan: Acute upper respiratory infectio

n, unspecified Discussion:

Exam is reassuring Likely viral illness Supportive care reviewed and encouraged 
Follow up PRN

                                        ICD-9 : 465.9

ICD-10 : J06.9

                                                    2017

 

                                        Appointment: Luba tSevenson 

                                        [address] [place] [address]                                              ACUTE ILLNESS   2017

 

             Patient Education: Patient Medication Summary                      

     Completed    2017

 

                          Visit Plan:               Supportive care. Rest, Fluid

s, Tylenol/Motrin prn fever or 

bodyaches. Notify if worsening symptoms.

                                                            2016

 

                                        Appointment: Akanksha Drivertel:[phone]

                                        [address] [place] [address]                                              ACUTE ILLNESS   2016

 

             Patient Education: Patient Medication Summary                      

     Completed    2016

 

                                        Appointment: Akanksha Driver

WPtel:[phone]

                                        [address] [place] [address]

[place]                                              INJECTION       10/07/2016

 

             Patient Education: Patient Medication Summary                      

     Completed    10/07/2016

 

                                        Appointment: Akanksha Driver

WPtel:[phone]

                                        [address] [place] [address]

[place]                                              LAB             2016

 

             Patient Education: Patient Medication Summary                      

     Completed    2016

 

                          Visit Plan:               Add miralax daily Use daily 

probiotic and metamucil and push fluids

Notify if worsens/persists

                                                            2016

 

                                        Appointment: Akanksha Driver

WPtel:[phone]

                                        [address] [place] [address]              16 confirmed ~sl                 ACUTE ILLNESS   2016

 

             Patient Education: Patient Medication Summary                      

     Completed    2016

 

                          Visit Plan:               No NSAIDs Kidney function di

scussed Discussed hydration Monitor lab

every 4months so will check CMP, HbA1C next month

                                                            2016

 

                                        Appointment: Akanksha Driver

WPtel:[phone]

                                        [address] [place] [address]              03/15 confirmed ~sl                 ACUTE ILLNESS   2016

 

             Patient Education: Patient Medication Summary                      

     Completed    2016

 

                          Visit Plan:               Vestibular exercises Refill 

flonase Strict low Na diet--discussed 

that needs to read labels Rx for walker with chair given due to muscle 
weakness/unsteadiness Has carotids and abdominal aorta and legs checked in 
January Check Chem 7

                                                            2015

 

                                        Appointment: Akanksha Driver

WPtel:[phone]

                                        [address] [place], [place] [id number]              12/15/15 appt confirmed cn                 FOLLOW UP       

 

             Patient Education: Patient Medication Summary                      

     Completed    2015

 

             Patient Education: Vertigo                           Completed    2015

 

                                        Appointment: Akanksha Driver

WPtel:[phone]

                                        [address] [place], [place] [address]

                                              INJECTION       10/16/2015

 

             Patient Education: Patient Medication Summary                      

     Completed    10/16/2015

 

                                        Appointment: Akanksha Driver

WPtel:[phone]

                                        [address] [place], [place] [id number]                                              [place]              09/15/2015

 

             Patient Education: Patient Medication Summary                      

     Completed    09/15/2015

 

                                        Referral: Landon De La Torre

WPtel:[phone]

#1 [facility] ROSA

[id number]

[place]              Referral                        Completed       2015

 

                                        Appointment: Akanksha Driver

WPtel:[phone]

                                        [address] [place], [place] [address]                                              LAB             09/10/2015

 

             Patient Education: Patient Medication Summary                      

     Completed    09/10/2015

 

                          Visit Plan:               Check CMP, CBC, TSH, Free T4

, B12, Lipids, HbA1C Discussed 6 small 

meals a day each with protein Would likely benefit from antidepressant Update 
colonoscopy

                                                            2015

 

                                        Appointment: Akanksha Driver

WPtel:[phone]

                                        [address] [place], [place] [address]

              9/8/15 confirmed                 ACUTE ILLNESS   2015

 

             Patient Education: Patient Medication Summary                      

     Completed    2015

 

                          Visit Plan:               Cerumen flush with warm wate

r and peroxide - good results Resume 

Nasonex nasal spray daily Recommended Debrox earwax removal drops

                                                            2015

 

                                        Appointment: Bertha Mon

WPtel:[phone]

                                        [address] [place] [address]                                              ACUTE ILLNESS   2015

 

             Patient Education: Patient Medication Summary                      

     Completed    2015

 

                          Visit Plan:               Continue aspirin daily Add m

eloxicam for 1week Elevate and Ice Call

on 2015

 

                                        Appointment: Akanksha Drivertepetey:[phone]

                                        [address] [place], [place] [id number]                                              ACUTE ILLNESS   2015

 

             Patient Education: Patient Medication Summary                      

     Completed    2015

 

                          Visit Plan:               Been using baclofen at [facility] and has helped some PT for next 

2-4weeks

                                                            2015

 

                                        Appointment: Akanksha Driver

WPtel:[phone]

                                        [address] [place] [address] Follow Up 2015

 

             Patient Education: Patient Medication Summary                      

     Completed    2015

 

                                        Appointment: Akanksha Driver

WPtel:[phone]

                                        [address] [place] [address]                                              LAB             2015

 

                                        Appointment: Akanksha Driver

WPtel:[phone]

                                        [address] [place], [place] [address]              feeling better, weather -                 FOLLOW UP       

 

                                        Referral: Landon De La Torre

WPtel:[phone]

[facility] [facility]              Referral                        Appointment Requested 2015

 

                          Visit Plan:               Continue exercise and curren

t meds See surgery for removal of right

arm lesion

                                                            2015

 

                                        Appointment: Akanksha Driver

WPtel:[phone]

                                        [address] [place], [place] [id number]

                                              FOLLOW UP       2015

 

             Patient Education: Patient Medication Summary                      

     Completed    2015

 

                                        Appointment: Akanksha Driver

WPtel:[phone]

                                        [address] [place], [place] [id number]

US                                              INJECTION       10/17/2014

 

             Patient Education: Patient Medication Summary                      

     Completed    10/17/2014

 

                                        Appointment: Bertha Mon

WPtel:[phone]

                                        [address] [place], [place] [id number]                                              ACUTE ILLNESS   2014

 

             Patient Education: Patient Medication Summary                      

     Completed    2014

 

                          Visit Plan:               Discussed that likely lumbar

 etiology for leg weakness Will change 

amlodopine to low dose dyazide and see if helps legs and inner ear

                                                            2014

 

                                        Appointment: Akanksha Driver

WPtel:[phone]

                                        [address] [place] [address]                                              FOLLOW UP       2014

 

             Patient Education: Patient Medication Summary                      

     Completed    2014

 

                          Visit Plan:               Ceftin and continue claritin

/flonase Decrease amlodopine to 2.5mg q

HS until fwup with Card due to weakness

                                                            2014

 

                                        Appointment: Akanksha Driver

WPtel:[phone]

                                        [address] [place] [address]                                              ACUTE ILLNESS   2014

 

             Patient Education: Patient Medication Summary                      

     Completed    2014

 

                                        Appointment: Akanksha Driver

WPtel:[phone]

                                        [address] [place] [address]                                              LAB             2014

 

             Patient Education: Patient Medication Summary                      

     Completed    2014

 

                                        Appointment: Bertha Mon

WPtel:[phone]

                                        [address] [place] [address]                                              ACUTE ILLNESS   2014

 

             Patient Education: Patient Medication Summary                      

     Completed    2014

 

                          Visit Plan:               Supportive care. Rest, Fluid

s, Tylenol prn fever or bodyaches. 

Notify if worsening symptoms. Ceftin

                                                            10/15/2013

 

                                        Appointment: Bertha Mon

WPtel:[phone]

                                        [address] [place] [address]                                              ACUTE ILLNESS   10/15/2013

 

             Patient Education: Patient Medication Summary                      

     Completed    10/15/2013

 

                                        Appointment: Akanksha Driver

WPtel:[phone]

                                        [address] [place] [address]                                              BP CHECK        2013

 

             Patient Education: Patient Medication Summary                      

     Completed    2013

 

                                        Appointment: Akanksha Driver

WPtel:[phone]

                                        [address] [place] [address]                                              ER Follow UP    2013

 

             Patient Education: Patient Medication Summary                      

     Completed    2013

 

                          Visit Plan:               Restart flonase BID Use mecl

izine 25mg q HS Vestibular exercises 

Claritin 10mg q AM See ENT if doesn't resolve

                                                            2013

 

                                        Appointment: Akanksha Driver

WPtel:[phone]

                                        [address] [place] [address]                                              ACUTE ILLNESS   2013

 

             Patient Education: Patient Medication Summary                      

     Completed    2013

 

                          Visit Plan:               Will continue to observe

                                                            2013

 

                                        Appointment: Akanksha Driver

WPtel:[phone]

                                        [address] [place] [address]                                              FOLLOW UP       2013

 

             Patient Education: Patient Medication Summary                      

     Completed    2013

 

                          Visit Plan:               ERx for Augmentin 875mg q12 

x 10 days and Floxin otic drops 5 gtts 

AD x7days Sample of Nasonex per pt request Discussed treatment (nasal saline, 
salt water gargles, keeping right ear clean and dry, for worsening go to UC on 
weekend, Tylenol/ibuprofen, etc.) RTC 2 weeks for ear recheck.

                                                            05/10/2013

 

                                        Appointment: Jill Jean 

WPtel:[phone]

                                        [address] [place] [address]                                              ACUTE ILLNESS   05/10/2013

 

             Patient Education: Patient Medication Summary                      

     Completed    05/10/2013

 

                          Visit Plan:               Decrease caffeine intake Marina

ck Bilateral Mammogram with US of left 

breast

                                                            2013

 

                                        Appointment: Akanksha Driver

WPtel:[phone]

                                        [address] [place] [address]                                              ACUTE ILLNESS   2013

 

             Patient Education: Patient Medication Summary                      

     Completed    2013

 

                                        Appointment: Kate Hall

WPtel:[phone]

                                        [address] [place], [place] [id number]

              appt scheduled                  ACUTE ILLNESS   2013

 

                                        Appointment: Akanksha Driver

WPtel:[phone]

                                        [address] [place], [place] [id number]

US                                              LAB             2012

 

             Patient Education: Patient Medication Summary                      

     Completed    2012

 

                          Visit Plan:               Continue off carafate and se

e how does Continue probiotic Add 

Vestibular exercises and use meclizine prn Continue Nasonex Check fasting lab 
including CMP, Lipids, CBC, TSH, Free T4, HbA1C

                                                            2012

 

                                        Appointment: Akanksha Drivertel:[phone]

                                        [address] [place], [place] [id number]

                                              FOLLOW UP       2012

 

             Patient Education: Patient Medication Summary                      

     Completed    2012

 

                          Visit Plan:               Continue carafate for 4more 

weeks at current dose then decrease to 

BID for 2wks then q HS

                                                            10/23/2012

 

                                        Appointment: Akanksha Driver

WPtel:[phone]

                                        [address] [place], [place] [id number]

                                              FOLLOW UP       10/23/2012

 

             Patient Education: Patient Medication Summary                      

     Completed    10/23/2012

 

                          Visit Plan:               Continue omeprazole Add Ellyn

fate 1gm po q AC Add daily probiotic

                                                            10/10/2012

 

                                        Appointment: Akanksha Driver

WPtel:[phone]

                                        [address] [place] [address]                                              ACUTE ILLNESS   10/10/2012

 

             Patient Education: Patient Medication Summary                      

     Completed    10/10/2012

 

                                        Appointment: Akanksha Driver

WPtel:[phone]

                                        [address] [place], [place] [id number]                                              INJECTION       2012

 

             Patient Education: Patient Medication Summary                      

     Completed    2012

 

                          Visit Plan:               Diabetic Diet Accuchecks BID

 alternating times Hold onglyza and 

Januvia for now and continue diet and exercise and weight loss and moniter BS 
Discussed hypoglycemia and snack of peanut butter and crackers with juice or 
milk

                                                            2012

 

                                        Appointment: Akanksha Drivertel:[phone]

                                        [address] [place], [place] [id number]

                                              WORK IN         2012

 

             Patient Education: Patient Medication Summary                      

     Completed    2012

 

                                        Appointment: Akanksha Drivertel:[phone]

                                        [address] [place], [place] [id number]

[facility]              2012

 

             Patient Education: Patient Medication Summary                      

     Completed    2012

 

                                        Appointment: Akanksha Drivertel:[phone]

                                        [address] [place], [place] [id number]

                                              LAB             2012

 

             Patient Education: Patient Medication Summary                      

     Completed    2012

 

                                        Appointment: Akanksha Driver

WPtel:[phone]

                                        [address] [place] [address]                                              ACUTE ILLNESS   2012

 

             Patient Education: Patient Medication Summary                      

     Completed    2012

 

                          Visit Plan:               Injection to Right SI joint 

as above Pt will call in 3 days on pain

Has PT starting in 2wks.

                                                            2012

 

                                        Appointment: Akanksha Driver

WPtel:[phone]

                                        [address] [place] [address]                                              ACUTE ILLNESS   2012

 

             Patient Education: Patient Medication Summary                      

     Completed    2012

 

                          Visit Plan:               OMT done Start PT May need u

pdated MRI if need to consider epidural

Prednisone

                                                            2012

 

                                        Appointment: Akanksha Driver

WPtel:[phone]

                                        [address] [place] [address]                                              OMT             2012

 

             Patient Education: Patient Medication Summary                      

     Completed    2012

 

                          Visit Plan:               Flexeril and Vimovo OMT done

 Daily stretches

                                                            2012

 

                                        Appointment: Akanksha Driver

WPtel:[phone]

                                        [address] [place] [address]                                              ACUTE ILLNESS   2012

 

             Patient Education: Patient Medication Summary                      

     Completed    2012

 

                          Visit Plan:               OMT done Daily stretches Vinod

st heat or biofreeze Right SI joint 

injection Call in 1week Vimovo BID

                                                            2012

 

                                        Appointment: Akanksha Driver

WPtel:[phone]

                                        [address] [place] [address]                                              ACUTE ILLNESS   2012

 

             Patient Education: Patient Medication Summary                      

     Completed    2012

 

                                        Appointment: Akanksha Drivertel:[phone]

                                        [address] [place] [address]                                              LAB             2012

 

             Patient Education: Patient Medication Summary                      

     Completed    2012

 

                          Visit Plan:               Decrease amlodopine to 1.25m

g daily Schedule with Cardiology 

Fasting lab in AM

                                                            2012

 

                                        Appointment: Akanksha Driver

WPtel:[phone]

                                        [address] [place], [place] [id number]                                              ACUTE ILLNESS   2012

 

             Patient Education: Patient Medication Summary                      

     Completed    2012

 

                          Visit Plan:               Saline nasal flushes prn. Ty

lenol/Motrin prn headache. Notify if 

persists/symptoms worsening. Cerumen removal from ears as above

                                                            2011

 

                                        Appointment: Akanksha Driver

WPtel:[phone]

                                        [address] [place] [address]                                              ACUTE ILLNESS   2011

 

             Patient Education: Patient Medication Summary                      

     Completed    2011

 

                                        Appointment: Akanksha Driver

WPtel:[phone]

                                        [address] [place] [address]

[place]                                              INJECTION       10/05/2011

 

             Patient Education: Patient Medication Summary                      

     Completed    10/05/2011

 

                          Visit Plan:               Continue vimovo for 1more we

ek Increase Omeprazole to BID for 1mo 

then resume QD if stomach improved Vestibular exercises with meclizine q HS for 
next week

                                                            2011

 

                                        Appointment: Akanksha Driver

WPtel:[phone]

                                        [address] [place] [address]                                              FOLLOW UP       2011

 

             Patient Education: Patient Medication Summary                      

     Completed    2011

 

                                        Appointment: Akanksha Driver

WPtel:[phone]

                                        [address] [place] [address]                                              ACUTE ILLNESS   2011

 

             Patient Education: Patient Medication Summary                      

     Completed    2011

 

                                        Appointment: Akanksha Driver

WPtel:[phone]

                                        [address] [place], [place] [id number]

[place]                                              LAB             2011

 

             Patient Education: Patient Medication Summary                      

     Completed    2011

 

                          Visit Plan:               Saline nasal flushes prn. Ty

lenol/Motrin prn headache. Notify if 

persists/symptoms worsening. Add Veramyst Increase Omeprazole to 20mg po BID

                                                            2011

 

                                        Appointment: Akanksha Driver

WPtel:[phone]

                                        [address] [place], [place] [address]                                              ACUTE ILLNESS   2011

 

             Patient Education: Patient Medication Summary                      

     Completed    2011

 

                                        Appointment: Akanksha Driver

WPtel:[phone]

                                        [address] [place] [address]                                              FOLLOW UP       2011

 

             Patient Education: Patient Medication Summary                      

     Completed    2011

 

                                        Appointment: Akanksha Driver

WPtel:[phone]

                                        [address] [place] [address]                                              ACUTE ILLNESS   2011

 

             Patient Education: Patient Medication Summary                      

     Completed    2011

 

                          Visit Plan:               Nasocourt sample given. Pt. 

will notify if symptoms are worse on 

Monday.

                                                            2010

 

                                        Appointment: Kate Hall

WPtel:[phone]

                                        [address] [place] [address]                                              ACUTE ILLNESS   2010

 

             Patient Education: Patient Medication Summary                      

     Completed    2010

 

                                        Appointment: Akanksha Driver

WPtel:[phone]

                                        [address] [place] [address]

US                                              INJECTION       10/06/2010

 

             Patient Education: Patient Medication Summary                      

     Completed    10/06/2010

 

                                        Appointment: Akanksha Driver

WPtel:[phone]

                                        [address] [place] [address]                                              FOLLOW UP       2010

 

             Patient Education: Patient Medication Summary                      

     Completed    2010

 

             Patient Education: Lexapro                           Completed    0

2010

 

                          Visit Plan:               May proceed with hiatal mykel

ia repair per Card. so will contact Dr. Cash's office to proceed with surgery Trial of Lexapro 5mg QD plus use 
xanax prn

                                                            2010

 

                                        Appointment: Akanksha Driver

WPtel:[phone]

                                        [address] [place] [address]                                              FOLLOW UP       2010

 

             Patient Education: Patient Medication Summary                      

     Completed    2010

 

                          Visit Plan:               B12 given Cont oral B12 and 

iron Fwup 1mo for B12 Proceed with 

hiatal hernia repair once card clearance

                                                            2010

 

                                        Appointment: Akanksha Driver

WPtel:[phone]

                                        [address] [place], [place] [id number]

                                              FOLLOW UP       2010

 

             Patient Education: Patient Medication Summary                      

     Completed    2010

 

                                        Appointment: Akanksha Driver

WPtel:[phone]

                                        [address] [place], [place] [id number]

                                              FOLLOW UP       2010

 

             Patient Education: Patient Medication Summary                      

     Completed    2010

 

                                        Appointment: Akanksha Driver

WPtel:[phone]

                                        [address] [place], [place] [id number]

US                                              LAB             2010

 

             Patient Education: Patient Medication Summary                      

     Completed    2010

 

                          Visit Plan:               Check fasting lab in AM--CMP

,Lipids, CBC, Vit D, TSH,FreeT4, B12

                                                            2010

 

                                        Appointment: Akanksha Driver

WPtel:[phone]

                                        [address] [place], [place] [id number]                                              FOLLOW UP       2010

 

             Patient Education: Patient Medication Summary                      

     Completed    2010

 

                                        Referral: Landon De La Torre

WPtel:[phone]

#1 [address]              Referral                        Appointment Requested  

 

                                        Referral: Landon De La Torre

WPtel:[phone]

#1 [address]              Referral                        Initiated        







Instructions





                                        Comment

 

                                        . Supportive care.  Rest, Fluids, Tyleno

l/Motrin prn fever or bodyaches.  Notify

if worsening symptoms.



 

                                        . Add miralax daily

Use daily probiotic and metamucil and push fluids

Notify if worsens/persists

 

                                        . No NSAIDs

Kidney function discussed

Discussed hydration 

Monitor lab every 4months so will check CMP, HbA1C next month

 

                                        . Vestibular exercises

Refill flonase

Strict low Na diet--discussed that needs to read labels

Rx for walker with chair given due to muscle weakness/unsteadiness

Has carotids and abdominal aorta and legs checked in January

Check Chem 7



 

                                        . Check CMP, CBC, TSH, Free T4, B12, Lip

ids, HbA1C

Discussed 6 small meals a day each with protein

Would likely benefit from antidepressant 

Update colonoscopy

 

                                        . Cerumen flush with warm water and tyler

xide - good results 

Resume Nasonex nasal spray daily

Recommended Debrox earwax removal drops 

 

                                        . Continue aspirin daily

Add meloxicam for 1week

Elevate and Ice

Call on Monday

 

                                        . Been using baclofen at bedtime and has

 helped some

PT for next 2-4weeks



 

                                        . Continue exercise and current meds

See surgery for removal of right arm lesion

 

                                        . Discussed that likely lumbar etiology 

for leg weakness

Will change amlodopine to low dose dyazide and see if helps legs and inner ear

 

                                        . Ceftin and continue claritin/flonase

Decrease amlodopine to 2.5mg q HS until fwup with Card due to weakness

 

                                        .  Supportive care.  Rest, Fluids, Tylen

ol prn fever or bodyaches.  Notify if 

worsening symptoms.

Ceftin

 

                                        . Restart flonase BID

Use meclizine 25mg q HS

Vestibular exercises

Claritin 10mg q AM

See ENT if doesn't resolve

 

                                        . Will continue to observe



 

                                        . ERx for Augmentin 875mg q12 x 10 days 

and Floxin otic drops 5 gtts AD x7days

Sample of Nasonex per pt request

Discussed treatment (nasal saline, salt water gargles, keeping right ear clean 
and dry, for worsening go to [facility] on weekend, Tylenol/ibuprofen, etc.)

RTC 2 weeks for ear recheck.

 

                                        . Decrease caffeine intake

Check Bilateral Mammogram with US of left breast

 

                                        Left ear flushed with warm water and per

oxide with ear syringe and then last 

removed with currette--peroxide drops instilled.  Tolerated well, no 
complications, TM intact.. Continue off carafate and see how does

Continue probiotic

Add Vestibular exercises and use meclizine prn

Continue Nasonex

Check fasting lab including CMP, Lipids, CBC, TSH, Free T4, HbA1C

 

                                        . Continue carafate for 4more weeks at c

urrent dose then decrease to BID for 

2wks then q HS

 

                                        . Continue omeprazole

Add Carafate 1gm po q AC

Add daily probiotic



 

                                        . Diabetic Diet

Accuchecks BID alternating times

Hold onglyza and Januvia for now and continue diet and exercise and weight loss 
and moniter BS

Discussed hypoglycemia and snack of peanut butter and crackers with juice or 
milk

 

                                        Informed Consent obtainded.  Right SI yasir

int cleansed with alcohol and betadine 

and injected with 3cc 1%l lidocaine with 40mg depomedrol and 40mg kenalog, 
tolerated well with no complications, neosporin and bandage applied. Injection 
to Right SI joint as above

Pt will call in 3 days on pain

Has PT starting in 2wks.

 

                                        . OMT done

Start PT

May need updated MRI if need to consider epidural

Prednisone

 

                                        . Flexeril and Vimovo

OMT done

Daily stretches

 

                                        Right SI joint cleansed with alchohol an

d betadine and injected with 3cc 1% 

lidocaine with 40mg depomedrol and 40mg kenalog, tolerated well with no 
complications, neosporin and bandage applied. OMT done

Daily stretches

Moist heat or biofreeze

Right SI joint injection

Call in 1week

Vimovo BID

 

                                        . Decrease amlodopine to 1.25mg daily

Schedule with Cardiology

Fasting lab in AM

 

                                        .  Saline nasal flushes prn. Tylenol/Mot

rin prn headache.  Notify if 

persists/symptoms worsening.

Cerumen removal from ears as above



 

                                        . Continue vimovo for 1more week

Increase Omeprazole to BID for 1mo then resume QD if stomach improved

Vestibular exercises with meclizine q HS for next week

 

                                        . Saline nasal flushes prn. Tylenol/Motr

in prn headache.  Notify if 

persists/symptoms worsening.

Add Veramyst

Increase Omeprazole to 20mg po BID

 

                                        . Nasocourt sample given.  Pt. will noti

fy if symptoms are worse on Monday. 

 

                                        . May proceed with hiatal hernia repair 

per Card. so will contact Dr. Cash's office to proceed with surgery



Trial of Lexapro 5mg QD plus use xanax prn

 

                                        . B12 given

Cont oral B12 and iron

Fwup 1mo for B12

Proceed with hiatal hernia repair once card clearance

 

                                        . Check fasting lab in AM--CMP,Lipids, C

BC, Vit D, TSH,FreeT4, B12







Medical Equipment

No Medical Equipment data



Health Concerns Section

Health Concerns data not found



Goals Section

Goals data not found



Interventions Section

Interventions data not found



Health Status Evaluations/Outcomes Section

Health Status Evaluations/Outcomes data not found



Advance Directives

No Advance Directive data

## 2020-02-19 NOTE — XMS REPORT
CCD document using C-CDA

                             Created on: 2020



Leilani Ramírez

External Reference #: [id number]

: 1940

Sex: Female



Demographics





                          Address                   [address] [place]  [address]

 

                          Home Phone                [phone]

 

                          Preferred Language        Unknown

 

                          Marital Status            

 

                          Orthodox Affiliation     Unknown

 

                          Race                      White

 

                          Ethnic Group              Not  or 





Author





                          Author                    Leilani Driver D.O.

 

                          Organization              [facility]

 

                          Address                   [address] [place]  [address]



 

                          Phone                     [phone]







Care Team Providers





                    Care Team Member Name Role                Phone

 

                    Akanksha Driver D.O. PP                  Unavailable

 

                          CCM                       Unavailable







Summary Purpose

Interface Exchange



Insurance Providers





             Payer name   Policy type / Coverage type Covered party ID Effective

 Begin Date 

Effective End Date

 

             [place] [place] [id number]  2018   

  Unknown

 

             [employer] Senior Supplemental Medicare Part B [id number]   [id number]    

 Unknown







Family history





Father



                          Diagnosis                 Age At Onset

 

                          Heart disease             Unknown







Mother



                          Diagnosis                 Age At Onset

 

                          Heart disease             Unknown

 

                          Cancer                    Unknown







Social History





                Social History Element Codes           Description     Effective

 Dates

 

                Tobacco history SNOMED CT: 502529887 Never smoker    2011

 

                Marital status  Unknown         Single          2010

 

                Number of children Unknown         9               2010







Allergies, Adverse Reactions, Alerts





           Substance  Reaction   Codes      Entered Date Inactivated Date Status

 

           _                     Unknown    2019 No Inactive Date Active

 

           * NO KNOWN FOOD ALLERGIES            Unknown    2010 No Inactiv

e Date Active

 

           _                     Unknown    2010 No Inactive Date Active

 

           QUINIDINE-QUININE ANALOGUES hives,     Unknown    2010 No Inact

diamante Date Active







Problems





                Condition       Codes           Effective Dates Condition Status

 

                          Essential (primary) hypertension ICD-9: 401.9

ICD-10: I10               2014                Active

 

                          Palpitations              ICD-9: 785.1

ICD-10: R00.2             10/02/2018                Active

 

                          Stress reaction           ICD-9: 308.9

ICD-10: F43.0             2020                Active

 

                          Mixed hyperlipidemia      ICD-9: 272.4

ICD-10: E78.2             2019                Active

 

                          FLU VACCINE               ICD-9: V04.81

ICD-10: Z23               2012                Active

 

                          Acute serous otitis media, left ear ICD-9: 381.01

ICD-10: H65.02            2019                Active

 

                          Coronary atherosclerosis due to calcified coronary les

ion ICD-9: 414.00

ICD-10: I25.84            2018                Active

 

                          Hypoglycemia, unspecified ICD-9: 251.2

ICD-10: E16.2             2017                Active

 

                          Other fatigue             ICD-9: 780.79

ICD-10: R53.83            2017                Active

 

                          Urinary tract infection, site not specified ICD-9: 599

.0

ICD-10: N39.0             10/02/2018                Active

 

                          Gastro-esophageal reflux disease without esophagitis I

CD-9: 530.81

ICD-10: K21.9             2018                Active

 

                          Epigastric pain           ICD-9: 789.06

ICD-10: R10.13            2018                Active

 

                          Atherosclerotic heart disease of native coronary arter

y without angina pectoris 

ICD-9: 414.00

ICD-10: I25.10            2018                Active

 

                          Generalized anxiety disorder ICD-9: 300.00

ICD-10: F41.1             2018                Active

 

                          Dizziness and giddiness   ICD-9: 780.4

ICD-10: R42               2014                Active

 

                          Occlusion and stenosis of bilateral carotid arteries I

CD-9: 433.10

ICD-10: I65.23            2018                Active

 

                          Cervicalgia               ICD-9: 723.1

ICD-10: M54.2             2018                Active

 

                          Other spondylosis with radiculopathy, cervical region 

ICD-9: 721.0

ICD-10: M47.22            2018                Active

 

                          Cervicocranial syndrome   ICD-9: 723.2

ICD-10: M53.0             2018                Active

 

                          Vertigo of central origin, bilateral ICD-9: 386.2

ICD-10: H81.43            2018                Active

 

                          Benign paroxysmal vertigo, bilateral ICD-9: 386.11

ICD-10: H81.13            2018                Active

 

                          Otalgia, bilateral        ICD-9: 388.70

ICD-10: H92.03            2018                Active

 

                          Left lower quadrant pain  ICD-9: 789.04

ICD-10: R10.32            2017                Active

 

                          Low back pain             ICD-9: 724.2

ICD-10: M54.5             2017                Active

 

                          Radiculopathy, lumbosacral region ICD-9: 724.4

ICD-10: M54.17            2018                Active

 

                          Impaired fasting glucose  ICD-9: 790.29

ICD-10: R73.01            2012                Active

 

                          Other intervertebral disc degeneration, lumbar region 

ICD-9: 722.52

ICD-10: M51.36            2017                Active

 

                          Pain in thoracic spine    ICD-9: 724.1

ICD-10: M54.6             2017                Active

 

                          Mastodynia                ICD-9: 611.71

ICD-10: N64.4             2017                Active

 

                          Acute upper respiratory infection, unspecified ICD-9: 

465.9

ICD-10: J06.9             2017                Active

 

                          Acute mastoiditis without complications, right ear ICD

-9: 383.00

ICD-10: H70.001           2016                Active

 

                          Constipation, unspecified ICD-9: 564.00

ICD-10: K59.00            2016                Active

 

                          Chronic kidney disease, stage 1 ICD-9: 585.1

ICD-10: N18.1             03/15/2016                Active

 

                          History of falling        ICD-9: V15.88

ICD-10: Z91.81            12/15/2015                Active

 

                          Muscle weakness (generalized) ICD-9: 780.79

ICD-10: M62.81            2015                Active

 

                Acute renal failure ICD-9: 584.9    2015      Active

 

                POLYURIA        ICD-9: 788.42   2015      Active

 

                CAD             ICD-9: 414.00   09/10/2015      Active

 

                Hyperglycemia   ICD-9: 790.29   2012      Active

 

                HYPERLIPIDEMIA NEC/NOS ICD-9: 272.4    09/10/2015      Active

 

                ABDOMINAL PAIN  ICD-9: 789.00   10/10/2012      Active

 

                Grieving        ICD-9: 309.0    2015      Active

 

                HYPOGLYCEMIA    ICD-9: 251.2    2012      Active

 

                MALAISE AND FATIGUE ICD-9: 780.79   2015      Active

 

                CERUMEN IMPACTION ICD-9: 380.4    2015      Active

 

                Leg pain        ICD-9: 729.5    2015      Active

 

                SUPERFIC PHLEBITIS-LEG ICD-9: 451.0    2015      Active

 

                SPASM OF MUSCLE ICD-9: 728.85   2015      Active

 

                Thoracic back pain ICD-9: 724.1    2015      Active

 

                Benign positional vertigo ICD-9: 386.11   2015      Active

 

                Suspicious nevus ICD-9: 238.2    2015      Active

 

                EUSTACHIAN TUBE DYSFUNCTION ICD-9: 381.81   2014      Acti

ve

 

                SINUSITIS, ACUTE ICD-9: 461.9    2014      Active

 

                DIZZINESS/VERTIGO ICD-9: 780.4    2014      Active

 

                HYPERTENSION    ICD-9: 401.9    2014      Active

 

                URI, ACUTE      ICD-9: 465.9    10/15/2013      Active

 

                CEPHALGIA       ICD-9: 784.0    2013      Active

 

                OTITIS MEDIA NOS ICD-9: 382.9    2013      Active

 

                Perforation of ear drum ICD-9: 384.20   05/10/2013      Active

 

                Mastalgia       ICD-9: 611.71   2013      Active

 

                DYSPEPSIA       ICD-9: 536.8    10/10/2012      Active

 

                IBS             ICD-9: 564.1    10/10/2012      Active

 

                FLU VACCINE     ICD-9: V04.81   2012      Active

 

                Radiculopathy of leg ICD-9: 724.4    2012      Active

 

                SCIATICA        ICD-9: 724.3    2012      Active

 

                Lumbar degenerative disc disease ICD-9: 722.52   2012     

 Active

 

                Sacroiliac dysfunction ICD-9: 739.4    2012      Active

 

                Hypertension    Unknown         2012      Active

 

                PAIN, LOWER BACK ICD-9: 724.2    2011      Active

 

                SPINAL ENTHESOPATHY ICD-9: 720.1    2011      Active

 

                Hypotension     ICD-9: 458.9    2011      Active

 

                SACROILIITIS NEC ICD-9: 720.2    2011      Active

 

                PHARYNGITIS, ACUTE ICD-9: 462      2010      Active

 

                URINARY TRACT INFECTION ICD-9: 599.0    2010      Active

 

                Heat exhaustion ICD-9: 992.5    2010      Active

 

                Esophagitis     ICD-9: 530.10   2010      Active

 

                Gastritis       ICD-9: 535.50   2010      Active

 

                GERD            ICD-9: 530.81   2010      Active

 

                Hiatal hernia   ICD-9: 553.3    2010      Active

 

                B12 DEFIC ANEMIA NEC ICD-9: 281.1    2010      Active

 

                Anxiety         Unknown         2010      Active

 

                Heart disease   Unknown         2010      Active

 

                Hyperlipidemia  Unknown         2010      Active

 

                ANXIETY STATE NOS ICD-9: 300.00   2010      Active

 

                DEPRESSIVE DISORDER NEC ICD-9: 311      2010      Active







Medications





          Medication Codes     Instructions Start Date Stop Date Status    Fill 

Instructions

 

                    metoprolol tartrate 25 mg tablet RxNorm: 154571      1 Table

t(s) Oral QAM and two 

tablets (50mg) in the evening 2020      Active           

 

                    Flonase Allergy Relief 50 mcg/actuation nasal spray,suspensi

on RxNorm: 7462862     2

Spray Nasal every night at bedtime 2020      Inactive     

    

 

           simvastatin 40 mg tablet RxNorm: 010595 1 Tablet(s) PO QD 2019 

02/15/2020 

Active                                   

 

                omeprazole 40 mg capsule,delayed release RxNorm: 552275  1 Capsu

le(s) Oral QD 

2019          Active               

 

                Xanax 0.25 mg tablet RxNorm: 602886  TAKE 1 TABLET BY MOUTH TWIC

E DAILY 

10/29/2019          No Stop Date        Active               

 

                omeprazole 40 mg capsule,delayed release RxNorm: 474000  1 Capsu

le(s) PO QD 

10/07/2019          2019          Inactive             

 

             metoprolol tartrate 25 mg tablet RxNorm: 026858 1 Tablet(s) PO BID 

2019                Inactive                   

 

                omeprazole 40 mg capsule,delayed release RxNorm: 639725  1 Capsu

le(s) PO QD 

2019          10/06/2019          Inactive             

 

                    Flonase Allergy Relief 50 mcg/actuation nasal spray,suspensi

on RxNorm: 9675087     2

[place] NASAL QHS 2019      Inactive         

 

           simvastatin 40 mg tablet RxNorm: 788749 1 Tablet(s) PO QD 2019 

Inactive                                 

 

           simvastatin 40 mg tablet RxNorm: 988665 1 Tablet(s) PO QD 2019 

Inactive                                 

 

                omeprazole 40 mg capsule,delayed release RxNorm: 405784  1 Capsu

le(s) PO QD 

2019          Inactive             

 

           simvastatin 40 mg tablet RxNorm: 534250 1 Tablet(s) PO QD 2019 

Inactive                                 

 

                omeprazole 40 mg capsule,delayed release RxNorm: 834322  1 Capsu

le(s) PO QD 

2019          Inactive             

 

             Bactrim  mg-160 mg tablet RxNorm: 547048 1 Tablet(s) PO BID 0

3/08/2019   

2019                Inactive                   

 

             Bactrim  mg-160 mg tablet RxNorm: 014481 1 Tablet(s) PO BID 0

3/08/2019   

2019                Inactive                   

 

             Macrobid 100 mg capsule RxNorm: 306209 1 Capsule(s) PO BID 20

                          Inactive                   

 

             metoprolol tartrate 25 mg tablet RxNorm: 812522 1 Tablet(s) PO BID 

2019                Inactive                   

 

                Xanax 0.25 mg tablet RxNorm: 201683  TAKE 1 TABLET BY MOUTH TWIC

E DAILY 

2018          10/28/2019          Inactive             

 

           Xanax 0.25 mg tablet RxNorm: 472086 1 Tablet(s) PO BID 2018 

Inactive                                 

 

                    Protonix 40 mg tablet,delayed release RxNorm: 566171      1 

Tablet(s) PO BID for 

stomach--replaces omeprazole 2018      Inactive         

 

             Carafate 1 gram tablet RxNorm: 542171 1 Tablet(s) PO AC & HS 2019                Inactive                   

 

           Xanax 0.25 mg tablet RxNorm: 645490 1 Tablet(s) PO BID 10/30/2018 12/

10/2018 

Inactive                                 

 

             Bactrim  mg-160 mg tablet RxNorm: 281830 1 Tablet(s) PO BID 1

   

10/08/2018                Inactive                   

 

             Bactrim  mg-160 mg tablet RxNorm: 500104 1 Tablet(s) PO BID 1

   

10/03/2018                Inactive                   

 

             Carafate 1 gram tablet RxNorm: 001829 1 Tablet(s) PO AC & HS 09/18/

2018   

10/17/2018                Inactive                   

 

                    Protonix 40 mg tablet,delayed release RxNorm: 897121      1 

Tablet(s) PO BID for 

stomach--replaces omeprazole 2018      Inactive         

 

                    Protonix 40 mg tablet,delayed release RxNorm: 920442      1 

Tablet(s) PO BID for 

stomach--replaces omeprazole 2018      Inactive         

 

                    fluticasone 50 mcg/actuation nasal spray,suspension RxNorm: 

1394746     2 Spray 

NASAL QD to each nostril 2018      Inactive         

 

             Nasonex 50 mcg/actuation Spray RxNorm: 2284683 2 [place] NASAL 2018                Inactive                   

 

                omeprazole 40 mg capsule,delayed release RxNorm: 037861  1 Capsu

le(s) PO QD 

2018          08/15/2018          Inactive             

 

                    simvastatin 40 mg tablet RxNorm: 310781      1 Tablet(s) PO 

QD TAKE 1 TABLET EVERY 

DAY             2018      Inactive         

 

             metoprolol tartrate 25 mg tablet RxNorm: 046109 1/2 Tablet(s) PO QD

 2018                Inactive                   

 

                simvastatin 40 mg tablet RxNorm: 797773  Tablet(s) TAKE 1 TABLET

 EVERY DAY 

2017          Inactive             

 

             metoprolol tartrate 25 mg tablet RxNorm: 316831 1/2 Tablet(s) PO QD

 2017                Inactive                   

 

                    Flonase 50 mcg/actuation nasal spray,suspension RxNorm: 1797

933     2 [place] NASAL 

BID             2017      Inactive         

 

                omeprazole 40 mg capsule,delayed release RxNorm: 693824  1 Capsu

le(s) PO QD 

2017          Inactive             

 

             metoprolol tartrate 25 mg tablet RxNorm: 094227 1/2 Tablet(s) PO QD

 04/10/2017   

2017                Inactive                   

 

                    ciprofloxacin 0.2 % ear drops in a dropperette RxNorm: 01323

6      4 Drop(s) OTIC TID

for 1 week      2016      Inactive         

 

           cefdinir 300 mg capsule RxNorm: 812464 2 Capsule(s) PO QD 2016 

Inactive                                 

 

                    omeprazole 40 mg capsule,delayed release RxNorm: 469885     

 TAKE 1 CAPSULE EVERY DAY

                2016      Inactive         

 

             simvastatin 40 mg tablet RxNorm: 086937 TAKE 1 TABLET EVERY DAY 2017                Inactive                   

 

                    Flonase 50 mcg/actuation nasal spray,suspension RxNorm: 1797

933     2 [place] NASAL 

BID             2015      Inactive         

 

             cefuroxime axetil 250 mg tablet RxNorm: 193092 1 Tablet(s) PO BID 0

2015                Inactive                   

 

             cefuroxime axetil 250 mg tablet RxNorm: 800952 1 Tablet(s) PO BID 0

2015                Inactive                   

 

                omeprazole 40 mg capsule,delayed release RxNorm: 815501  1 Capsu

le(s) PO QD 

2015          Inactive             

 

           meloxicam 7.5 mg tablet RxNorm: 621959 1 Tablet(s) PO QD 2015 0

2015 

Inactive                                 

 

                loratadine 10 mg tablet RxNorm: 926577  1 Tablet(s) PO QAM for a

llergies 

2014          Inactive             

 

                    Flonase 50 mcg/actuation nasal spray,suspension RxNorm: 8963

23      2 [place] NASAL BID

                2014      12/15/2015      Inactive         

 

           prednisone 20 mg tablet RxNorm: 571602 2 Tablet(s) PO BID 2014 

Inactive                                 

 

             Dyazide 37.5 mg-25 mg capsule RxNorm: 664369 1 Capsule(s) PO QAM 2014                Inactive                   

 

           Ceftin 500 mg tablet RxNorm: 613460 1 Tablet(s) PO BID 2014 

Inactive                                 

 

           Ceftin 500 mg tablet RxNorm: 744557 1 Tablet(s) PO BID 2014 

Inactive                                 

 

                    simvastatin 40 mg tablet RxNorm: 898098      Tablet(s) PO TA

KE ONE TABLET BY MOUTH 

EVERY DAY       2014      Inactive         

 

                    metoprolol tartrate 25 mg tablet RxNorm: 148154      Tablet(

s) PO TAKE ONE-HALF 

TABLET BY MOUTH EVERY DAY 2014      Inactive         

 

           Ceftin 500 mg tablet RxNorm: 639585 1 Tablet(s) PO BID 10/15/2013 10/

 

Inactive                                 

 

                loratadine 10 mg tablet RxNorm: 279451  1 Tablet(s) PO QAM for a

llergies 

2013          Inactive             

 

                    omeprazole 20 mg capsule,delayed release RxNorm: 705578     

 Capsule(s) PO TAKE ONE 

CAPSULE BY MOUTH TWICE DAILY 2013      Inactive         

 

                omeprazole 20 mg capsule,delayed release RxNorm: 567647  1 Capsu

le(s) PO BID 

2013          Inactive             

 

             Augmentin 875 mg-125 mg tablet RxNorm: 178889 1 Tablet(s) PO Q12H 0

5/10/2013   

2013                Inactive                   

 

             Floxin Otic Drops 1 bottle Drops RxNorm:      5 Drop(s) OTIC BID 05

/10/2013   

2013                Inactive                   

 

                    metoprolol tartrate 25 mg tablet RxNorm: 030921      Tablet(

s) PO TAKE ONE-HALF 

TABLET BY MOUTH EVERY DAY 2013      Inactive         

 

                    simvastatin 40 mg tablet RxNorm: 836311      Tablet(s) PO TA

KE ONE TABLET BY MOUTH 

EVERY DAY       2013      Inactive         

 

                lancets         RxNorm:         Misc Miscellaneous USE ONE TO CH

YOGI GLUCOSE EVERY DAY 

2013          Inactive             

 

             Carafate 1 gram tablet RxNorm: 121225 1 Tablet(s) PO AC & HS 2015                Inactive                   

 

           Flagyl 500 mg tablet RxNorm: 925817 1 Tablet(s) PO TID 10/10/2012 10/

 

Inactive                                 

 

             Carafate 1 gram tablet RxNorm: 446217 1 Tablet(s) PO AC & HS 10/10/

2012   

2012                Inactive                   

 

          One Touch Test strips RxNorm:   Miscellaneous QD 2012

 Inactive  

one touch ultra mini test strips

 

           Protonix 40 mg Tab RxNorm: 047771 1 Tablet(s) PO QD 2012 

Inactive                                 

 

           Protonix 40 mg Tab RxNorm: 721245 1 Tablet(s) PO QD 2012 10/09/

2012 

Inactive                                 

 

           prednisone 20 mg Tab RxNorm: 893162 1 Tablet(s) PO BID 2012 

Inactive                                 

 

             Flexeril 5 mg Tab RxNorm: 228204 1 Tablet(s) PO QHS for spasm 2012                Inactive                   

 

             Flexeril 5 mg Tab RxNorm: 471435 1 Tablet(s) PO QHS for spasm 2012                Inactive                   

 

                amlodipine 2.5 mg Tab RxNorm: 510796  1/2 Tablet(s) PO QD replac

es 5mg dose 

2012          Inactive             

 

           simvastatin 40 mg tablet RxNorm: 445425 1 Tablet(s) PO QD 2012 

Inactive                                 

 

             metoprolol tartrate 25 mg tablet RxNorm: 736166 1/2 Tablet(s) PO QD

 2012                Inactive                   

 

                omeprazole 20 mg capsule,delayed release RxNorm: 681377  1 Capsu

le(s) PO BID 

2012          Inactive             

 

           cefdinir 300 mg Cap RxNorm: 167472 2 Capsule(s) PO QD 2011 

Inactive                                 

 

           simvastatin 40 mg Tab RxNorm: 788638 1 Tablet(s) PO QD 2011 

Inactive                                 

 

             metoprolol tartrate 25 mg Tab RxNorm: 126018 1/2 Tablet(s) PO QD 10

/   

2012                Inactive                   

 

             metoprolol tartrate 25 mg Tab RxNorm: 407415 1/2 Tablet(s) PO QD 10

/   

10/24/2011                Inactive                   

 

           meclizine 25 mg Tab RxNorm: 5464592 1 Tablet(s) PO QHS 2011 

Inactive                                for dizziness

 

           Ceftin 500 mg Tab RxNorm: 376735 1 Tablet(s) PO BID 2011 

Inactive                                 

 

                omeprazole 20 mg Cap, Delayed Release RxNorm: 313449  1 Capsule(

s) PO BID 

2011          10/24/2011          Inactive             

 

           simvastatin 40 mg Tab RxNorm: 885705 1 Tablet(s) PO QD 2011 

Inactive                                 

 

           simvastatin 40 mg Tab RxNorm: 828617 1 Tablet(s) PO QD 2011 

Inactive                                 

 

           simvastatin 40 mg Tab RxNorm: 082290 1 Tablet(s) PO QD 2010 

Inactive                                 

 

             Tessalon Perles 100 mg Cap RxNorm: 978579 1 Capsule(s) PO Q6-8H 2010                Inactive                   

 

           cefdinir 300 mg Cap RxNorm: 320120 1 Capsule(s) PO BID 2010 

Inactive                                 

 

           Lexapro 10 mg Tab RxNorm: 426204 1 Tablet(s) PO QD 2010

010 

Inactive                                 

 

           Cipro 250 mg Tab RxNorm: 568186 1 Tablet(s) PO BID 2010 10/04/2

010 

Inactive                                 

 

             Wellbutrin  mg 24 hr Tab RxNorm: 429443 1 Tablet(s) PO QAM 2010                Inactive                   

 

          Fish Oil 1,000 mg Cap RxNorm:   1 Capsule(s) PO QD No Start Date      

     Active     

 

                Tylenol Extra Strength 500 mg tablet RxNorm: 411101  1/2 Tablet(

s) PO as needed 

No Start Date                           Active               

 

                nitroglycerin 0.4 mg sublingual tablet RxNorm: 489179  Tablet(s)

 SL as needed No 

Start Date                              Active               

 

                    Calcium with Vitamin D 600 mg (1,500 mg)-400 unit tablet RxN

orm: 774852      1 

Tablet(s) PO QD No Start Date                   Active           

 

           Multivitamin & Mineral Formula Tab RxNorm:    1 Tablet(s) PO QD No St

art Date            

Active                                   

 

          vitamin B complex capsule RxNorm:   1 Capsule(s) PO QD No Start Date  

         Active     

 

          Aspirin 81 mg Tab RxNorm: 725916 1 Tablet(s) PO QD No Start Date      

     Active     

 

                    isosorbide mononitrate ER 30 mg tablet,extended release 24 h

r RxNorm: 600767      1 

Tablet(s) PO QHS No Start Date                   Active           

 

           Vitamin D 1,000 unit Cap RxNorm: 314637 1 Capsule(s) PO QD No Start D

ate            

Active                                   

 

          Co Q-10 oral RxNorm: 89243 oral      No Start Date           Active   

  

 

             metoprolol tartrate 25 mg Tab RxNorm: 853732 1/2 Tablet(s) PO QD No

 Start Date 

10/23/2011                Inactive                   

 

             Nasonex 50 mcg/actuation Spray RxNorm: 5915589 2 Spray NASAL No Sta

rt Date 

2018                Inactive                   

 

           Vitamin B12 1000mcg Tablet RxNorm:    1 Tablet(s) PO QD No Start Date

 2015 

Inactive                                 

 

           amlodipine 5 mg Tab RxNorm: 328045 1 Tablet(s) PO QD No Start Date  

Inactive                                 

 

             simvastatin 40 mg tablet RxNorm: 132475 1 Tablet(s) PO QD No Start 

Date 

2019                Inactive                   

 

                omeprazole 20 mg capsule,delayed release RxNorm: 996280  1 Capsu

le(s) PO BID No 

Start Date          2013          Inactive             

 

                omeprazole 40 mg capsule,delayed release RxNorm: 687642  1 Capsu

le(s) PO QD No 

Start Date          2019          Inactive             

 

           Nexium 40 mg Cap RxNorm: 321513 1 Capsule(s) PO QD No Start Date  

Inactive                                 

 

             Stool Softener 100 mg Tab RxNorm: 3275190 2 Tablet(s) PO BID No Sta

rt Date 

2012                Inactive                   

 

                    Calcium with Vitamin D 600 mg (1,500 mg)-400 unit Tab RxNorm

: 251534      1 Tablet(s)

PO QD           No Start Date   2015      Inactive         

 

             iron 325 mg (65 mg iron) Tab RxNorm: 921972 1 Tablet(s) PO QD No St

art Date 

2015                Inactive                   

 

                Flonase 50 mcg/actuation Nasal Spray RxNorm: 121948  2 [place] ROZ

AL BID No Start 

Date                2014          Inactive             

 

                    fluticasone 50 mcg/actuation nasal spray,suspension RxNorm: 

5027626     2 Spray 

NASAL QD to each nostril No Start Date   2018      Inactive         

 

             Nasonex 50 mcg/actuation Spray RxNorm: 4115051 2 Spray NASAL QD No 

Start Date 

2018                Inactive                   

 

                    hydralazine 50 mg tablet RxNorm: 896961      1 Tablet(s) PO 

as needed for BP over 

160/90          No Start Date   2018      Inactive         

 

             Vimovo 500 mg-20 mg 12 hr Tab RxNorm: 095333 1 Tablet(s) PO BID No 

Start Date 

2011                Inactive                   

 

             Tylenol PM 25 mg-500 mg/15 mL Oral Soln RxNorm: 5526957 1 PO QPM   

  No Start Date 

2015                Inactive                   

 

          Iron (Ferrous Sulfate) Oral RxNorm:   Oral      No Start Date 20

12 Inactive   

 

             Reglan 10 mg Tab RxNorm: 487041 1 Tablet(s) PO TID before meals No 

Start Date 

2011                Inactive                   

 

           Xanax 1 mg Tab RxNorm: 897885 1/2 Tablet(s) PO QD No Start Date  

Inactive                                 

 

             amlodipine 10 mg tablet RxNorm: 432808 1 Tablet(s) PO QD No Start D

ate 

2014                Inactive                   

 

          Iron (dried) Oral RxNorm:   Oral      No Start Date 2012 Inactiv

e   

 

                metoprolol tartrate 50 mg tablet RxNorm: 878346  1 Tablet(s) PO 

BID No Start Date

                    12/10/2018          Inactive             

 

           Xanax 0.25 mg tablet RxNorm: 657286 1 Tablet(s) PO BID No Start Date 

10/29/2018 

Inactive                                 

 

                lancets         RxNorm:         Miscellaneous check blood sugar 

at least once daily No Start 

Date                2013          Inactive             

 

           simvastatin 40 mg Tab RxNorm: 181841 1 Tablet(s) PO QD No Start Date 

2010 

Inactive                                 

 

                    isosorbide mononitrate ER 30 mg tablet,extended release 24 h

r RxNorm: 602071      1 

Tablet(s) PO QHS No Start Date   2019      Inactive         

 

             amlodipine 2.5 mg tablet RxNorm: 025868 1 Tablet(s) PO QD No Start 

Date 

2014                Inactive                   

 

             sucralfate 1 gram tablet RxNorm: 088304 1 Tablet(s) PO QID No Start

 Date 

2019                Inactive                   

 

          Fish Oil Oral RxNorm:   Oral      No Start Date 2012 Inactive   

 

          Multiple Vitamin Oral RxNorm:   Oral      No Start Date 2012 [facility]

ctive   

 

                metoprolol tartrate 25 mg tablet RxNorm: 315065  1/2 Tablet(s) P

O QD No Start 

Date                2017          Inactive             

 

                metoprolol tartrate 50 mg tablet RxNorm: 643743  1/2 Tablet(s) P

O BID No Start 

Date                2019          Inactive             

 

                    Flonase Allergy Relief 50 mcg/actuation nasal spray,suspensi

on RxNorm: 2360707     2

[place] NASAL QHS No Start Date   2019      Inactive         







Medication Administered

No Medication Administered data



Immunizations





                Vaccine         Codes           Date            Status

 

                Influenza       CVX: 135        10/22/2019      Complete

 

                Influenza       CVX: 135        10/22/2019      Complete

 

                Influenza       CVX: 135        2018      Complete

 

                Influenza       CVX: 135        10/06/2017      Complete

 

                Influenza       CVX: 135        10/07/2016      Complete

 

                Influenza       CVX: 135        10/16/2015       

 

                Influenza       CVX: 141        2013       

 

                Influenza (Adult) CVX: 141        10/06/2010       







Results





          Observation Observation Code Item      Item Code Result    Date      S

[facility] Location

 

          COMPLETE BLOOD COUNT [id number]   WBC                 7.1 10e9/L 20

20 Unknown

 

          COMPLETE BLOOD COUNT [id number]   RBC                 4.16 10e12/L 2020 Unknown

 

          COMPLETE BLOOD COUNT 0804831   HEMOGLOBIN           12.4 g/dL 20

20 Unknown

 

          COMPLETE BLOOD COUNT [id number]   HEMATOCRIT           39.3 %    20

20 Unknown

 

          COMPLETE BLOOD COUNT [id number]   MCV                 94.5 fL   

0 Unknown

 

          COMPLETE BLOOD COUNT [id number]   MCH                 29.8 pg   

0 Unknown

 

          COMPLETE BLOOD COUNT 0174860   MCHC                31.6 g/dL 

0 Unknown

 

          COMPLETE BLOOD COUNT [id number]   PLATELET COUNT           161 10e9/L 2020 Unknown

 

          COMPLETE BLOOD COUNT [id number]   Mean Plt Volume           10.8 fL   2020 Unknown

 

          COMPLETE BLOOD COUNT [id number]   Neut Auto           38.7 %    

0 Unknown

 

          COMPLETE BLOOD COUNT [id number]   Lymph Auto           48.0 %    20

20 Unknown

 

          COMPLETE BLOOD COUNT [id number]   Mono Auto           9.5 %     

0 Unknown

 

          COMPLETE BLOOD COUNT [id number]   RDW                 13.5 %    

0 Unknown

 

          COMPLETE BLOOD COUNT [id number]   Eos Auto            3.2 %     

0 Unknown

 

          COMPLETE BLOOD COUNT [id number]   Baso Auto           0.6 %     

0 Unknown

 

          COMPLETE BLOOD COUNT [id number]   Neutrophil Abs           2.75 10e9/L  Unknown

 

          COMPLETE BLOOD COUNT [id number]   Lymphocyte Abs           3.41 10e9/L  Unknown

 

          COMPLETE BLOOD COUNT [id number]   Monocyte Abs           0.67 10e9/L 2020 Unknown

 

          COMPLETE BLOOD COUNT [id number]   Eosinophil Abs           0.23 10e9/L  Unknown

 

          COMPLETE BLOOD COUNT [id number]   RDW-SD              44.7 fL   

0 Unknown

 

          COMPLETE BLOOD COUNT [id number]   Basophil Abs           0.04 10e9/L 2020 Unknown

 

          THYROID STIMULATING HORMONE 00648     TSH                 1.677 uIU/mL

 2020 Unknown

 

          COMPREHENSIVE METABOLIC 07420     AST                 19 U/L    2020 Unknown

 

          COMPREHENSIVE METABOLIC 72540     ALT                 12 U/L    2020 Unknown

 

          COMPREHENSIVE METABOLIC 01934     BUN                 17 mg/dL  2020 Unknown

 

          COMPREHENSIVE METABOLIC 60765     ALBUMIN             4.2 g/dL  2020 Unknown

 

          COMPREHENSIVE METABOLIC 01923     CHLORIDE            103 mmol/L  Unknown

 

          COMPREHENSIVE METABOLIC 30870     Bili Total           0.2 mg/dL  Unknown

 

          COMPREHENSIVE METABOLIC 02977     ALK PHOS            61 U/L    2020 Unknown

 

          COMPREHENSIVE METABOLIC 31225     SODIUM              140 mmol/L  Unknown

 

          COMPREHENSIVE METABOLIC 45661     CREATININE           0.95 mg/dL 2020 Unknown

 

          COMPREHENSIVE METABOLIC 72326     CALCIUM             9.4 mg/dL 2020 Unknown

 

          COMPREHENSIVE METABOLIC 39705     POTASSIUM           4.2 mmol/L  Unknown

 

          COMPREHENSIVE METABOLIC 24494     Total Protein           6.5 g/dL   Unknown

 

          COMPREHENSIVE METABOLIC 58816     Glucose             103 mg/dL 2020 Unknown

 

          COMPREHENSIVE METABOLIC 27041     Bicarbonate           26 mmol/L 2020 Unknown

 

          COMPREHENSIVE METABOLIC 95310     AGAP                11 mmol/L 2020 Unknown

 

          GFR CALC  2436456   GFR Non Afr Amr           57 mL/min 2020 Unk

nown

 

          GFR CALC  5150152   GFR Afr Amr           >60 mL/min 2020 Unknow

n

 

          GFR CALC  5492511   GFR Non Afr Amr           52 mL/min 10/11/2019 Unk

nown

 

          GFR CALC  5648716   GFR Afr Amr           >60 mL/min 10/11/2019 Unknow

n

 

          COMPREHENSIVE METABOLIC 94175     AST                 17 U/L    10/11/

2019 Unknown

 

          COMPREHENSIVE METABOLIC 71089     ALT                 11 U/L    10/11/

2019 Unknown

 

          COMPREHENSIVE METABOLIC 05126     BUN                 17 mg/dL  10/11/

2019 Unknown

 

          COMPREHENSIVE METABOLIC 72680     ALBUMIN             3.9 g/dL  10/11/

2019 Unknown

 

          COMPREHENSIVE METABOLIC 77621     CHLORIDE            108 mmol/L 10/11

/2019 Unknown

 

          COMPREHENSIVE METABOLIC 75566     Bili Total           0.5 mg/dL 10/11

/2019 Unknown

 

          COMPREHENSIVE METABOLIC 13029     ALK PHOS            72 U/L    10/11/

2019 Unknown

 

          COMPREHENSIVE METABOLIC 56892     SODIUM              142 mmol/L 10/11

/2019 Unknown

 

          COMPREHENSIVE METABOLIC 15974     CREATININE           1.02 mg/dL 10/1

2019 Unknown

 

          COMPREHENSIVE METABOLIC 42667     CALCIUM             9.3 mg/dL 10/11/

2019 Unknown

 

          COMPREHENSIVE METABOLIC 39688     POTASSIUM           4.0 mmol/L 10/11

/2019 Unknown

 

          COMPREHENSIVE METABOLIC 57201     Total Protein           6.2 g/dL  10

/2019 Unknown

 

          COMPREHENSIVE METABOLIC 03370     Glucose             93 mg/dL  10/11/

2019 Unknown

 

          COMPREHENSIVE METABOLIC 17274     Bicarbonate           27 mmol/L 10/1

2019 Unknown

 

          COMPREHENSIVE METABOLIC 14499     AGAP                7 mmol/L  10/11/

2019 Unknown

 

          GFR CALC  3513233   GFR Non Afr Amr           48 mL/min 06/10/2019 Unk

nown

 

          GFR CALC  8590778   GFR Afr Amr           59 mL/min 06/10/2019 Unknown

 

          THYROID STIMULATING HORMONE 99940     TSH                 1.936 uIU/mL

 06/10/2019 Unknown

 

          COMPREHENSIVE METABOLIC 88927     AST                 18 U/L    06/10/

2019 Unknown

 

          COMPREHENSIVE METABOLIC 30433     ALT                 11 U/L    06/10/

2019 Unknown

 

          COMPREHENSIVE METABOLIC 44799     BUN                 18 mg/dL  06/10/

2019 Unknown

 

          COMPREHENSIVE METABOLIC 28991     ALBUMIN             4.2 g/dL  06/10/

2019 Unknown

 

          COMPREHENSIVE METABOLIC 15223     CHLORIDE            109 mmol/L 06/10

/2019 Unknown

 

          COMPREHENSIVE METABOLIC 14077     Bili Total           0.4 mg/dL 06/10

/2019 Unknown

 

          COMPREHENSIVE METABOLIC 36932     ALK PHOS            64 U/L    06/10/

2019 Unknown

 

          COMPREHENSIVE METABOLIC 88395     SODIUM              142 mmol/L 06/10

/2019 Unknown

 

          COMPREHENSIVE METABOLIC 37092     CREATININE           1.09 mg/dL  Unknown

 

          COMPREHENSIVE METABOLIC 91931     CALCIUM             9.3 mg/dL 06/10/

2019 Unknown

 

          COMPREHENSIVE METABOLIC 55648     POTASSIUM           4.7 mmol/L 06/10

/2019 Unknown

 

          COMPREHENSIVE METABOLIC 58074     Total Protein           6.3 g/dL  06

/10/2019 Unknown

 

          COMPREHENSIVE METABOLIC 74923     Glucose             84 mg/dL  06/10/

2019 Unknown

 

          COMPREHENSIVE METABOLIC 94616     Bicarbonate           25 mmol/L  Unknown

 

          COMPREHENSIVE METABOLIC 39175     AGAP                8 mmol/L  06/10/

2019 Unknown

 

          COMPLETE BLOOD COUNT [id number]   WBC                 7.8 10e9/L 06/10/20

19 Unknown

 

          COMPLETE BLOOD COUNT [id number]   RBC                 4.04 10e12/L 06/10/

2019 Unknown

 

          COMPLETE BLOOD COUNT [id number]   HEMOGLOBIN           12.2 g/dL 06/10/20

19 Unknown

 

          COMPLETE BLOOD COUNT [id number]   HEMATOCRIT           38.6 %    06/10/20

19 Unknown

 

          COMPLETE BLOOD COUNT [id number]   MCV                 95.5 fL   06/10/201

9 Unknown

 

          COMPLETE BLOOD COUNT [id number]   MCH                 30.2 pg   06/10/201

9 Unknown

 

          COMPLETE BLOOD COUNT [id number]   MCHC                31.6 g/dL 06/10/201

9 Unknown

 

          COMPLETE BLOOD COUNT [id number]   PLATELET COUNT           215 10e9/L 06/

10/2019 Unknown

 

          COMPLETE BLOOD COUNT [id number]   Mean Plt Volume           11.2 fL   06/

10/2019 Unknown

 

          COMPLETE BLOOD COUNT [id number]   Neut Auto           45.7 %    06/10/201

9 Unknown

 

          COMPLETE BLOOD COUNT [id number]   Lymph Auto           42.1 %    06/10/20

19 Unknown

 

          COMPLETE BLOOD COUNT [id number]   Mono Auto           9.2 %     06/10/201

9 Unknown

 

          COMPLETE BLOOD COUNT [id number]   RDW                 13.4 %    06/10/201

9 Unknown

 

          COMPLETE BLOOD COUNT [id number]   Eos Auto            2.7 %     06/10/201

9 Unknown

 

          COMPLETE BLOOD COUNT [id number]   Baso Auto           0.3 %     06/10/201

9 Unknown

 

          COMPLETE BLOOD COUNT [id number]   Neutrophil Abs           3.56 10e9/L 06

/10/2019 Unknown

 

          COMPLETE BLOOD COUNT [id number]   Lymphocyte Abs           3.28 10e9/L 06

/10/2019 Unknown

 

          COMPLETE BLOOD COUNT [id number]   Monocyte Abs           0.72 10e9/L  Unknown

 

          COMPLETE BLOOD COUNT [id number]   Eosinophil Abs           0.21 10e9/L 06

/10/2019 Unknown

 

          COMPLETE BLOOD COUNT [id number]   RDW-SD              44.9 fL   06/10/201

9 Unknown

 

          COMPLETE BLOOD COUNT [id number]   Basophil Abs           0.02 10e9/L  Unknown

 

          COMPLETE BLOOD COUNT [id number]   WBC                 5.2 10e9/L 20

18 Unknown

 

          COMPLETE BLOOD COUNT [id number]   RBC                 4.21 10e12/L 2018 Unknown

 

          COMPLETE BLOOD COUNT [id number]   HEMOGLOBIN           12.8 g/dL 20

18 Unknown

 

          COMPLETE BLOOD COUNT [id number]   HEMATOCRIT           39.0 %    20

18 Unknown

 

          COMPLETE BLOOD COUNT [id number]   MCV                 92.6 fL   

8 Unknown

 

          COMPLETE BLOOD COUNT [id number]   MCH                 30.4 pg   

8 Unknown

 

          COMPLETE BLOOD COUNT [id number]   MCHC                32.8 g/dL 

8 Unknown

 

          COMPLETE BLOOD COUNT [id number]   PLATELET COUNT           202 10e9/L  Unknown

 

          COMPLETE BLOOD COUNT [id number]   Mean Plt Volume           10.7 fL    Unknown

 

          COMPLETE BLOOD COUNT [id number]   Neut Auto           40.9 %    

8 Unknown

 

          COMPLETE BLOOD COUNT [id number]   Lymph Auto           46.3 %    20

18 Unknown

 

          COMPLETE BLOOD COUNT [id number]   Mono Auto           9.3 %     

8 Unknown

 

          COMPLETE BLOOD COUNT [id number]   RDW                 13.7 %    

8 Unknown

 

          COMPLETE BLOOD COUNT [id number]   Eos Auto            2.9 %     

8 Unknown

 

          COMPLETE BLOOD COUNT [id number]   Baso Auto           0.6 %     

8 Unknown

 

          COMPLETE BLOOD COUNT [id number]   Neutrophil Abs           2.13 10e9/L  Unknown

 

          COMPLETE BLOOD COUNT [id number]   Lymphocyte Abs           2.41 10e9/L  Unknown

 

          COMPLETE BLOOD COUNT [id number]   Monocyte Abs           0.48 10e9/L  Unknown

 

          COMPLETE BLOOD COUNT [id number]   Eosinophil Abs           0.15 10e9/L  Unknown

 

          COMPLETE BLOOD COUNT [id number]   RDW-SD              45.2 fL   

8 Unknown

 

          COMPLETE BLOOD COUNT [id number]   Basophil Abs           0.03 10e9/L  Unknown

 

          [place] [address]     Glucose             118 mg/dL  Unknown

 

          [place] [address]     CREATININE           1.01 mg/dL  Unknown

 

          [place] [address]     BUN                 14 mg/dL   Unknown

 

          [place] [address]     SODIUM              141 mmol/L  Unknown

 

          [place] [address]     POTASSIUM           4.0 mmol/L  Unknown

 

          [place] [address]     CHLORIDE            108 mmol/L  Unknown

 

          [place] [address]     Bicarbonate           25 mmol/L  Unknown

 

          [place] [address]     AGAP                8 mmol/L   Unknown

 

          [place] [address]     CALCIUM             9.6 mg/dL  Unknown

 

          FREE T4   88774     T4 Free             1.23 ng/dL 2018 Unknown

 

          GFR CALC  9468547   GFR Non Afr Amr           53 mL/min 2018 Unk

nown

 

          GFR CALC  6642716   GFR Afr Amr           >60 mL/min 2018 Unknow

n

 

          THYROID STIMULATING HORMONE 52051     TSH                 2.124 uIU/mL

 2018 Unknown

 

          LIPID GROUP 95566     Cholesterol           152 mg/dL 2018 Unkno

wn

 

          LIPID GROUP 63754     Triglyceride           151 mg/dL 2018 Unkn

own

 

          LIPID GROUP 30982     HDL CHOLESTEROL           47 mg/dL  2018 U

nknown

 

          LIPID GROUP 39416     Chol/HDL Ratio           3.23 ratio 2018 U

nknown

 

          LIPID GROUP 95939     NON-HDL Chol           105 mg/dL 2018 Unkn

own

 

          LIPID GROUP 93213     LDL Cholesterol           75 mg/dL  2018 U

nknown

 

          ASSAY OF TROPONIN QUANT 94145     Troponin-I           <0.30 ng/mL  Unknown

 

          COMPREHENSIVE METABOLIC 00328     AST                 20 U/L    2018 Unknown

 

          COMPREHENSIVE METABOLIC 89825     ALT                 14 U/L    2018 Unknown

 

          COMPREHENSIVE METABOLIC 75673     BUN                 19 mg/dL  2018 Unknown

 

          COMPREHENSIVE METABOLIC 15114     ALBUMIN             4.2 g/dL  2018 Unknown

 

          COMPREHENSIVE METABOLIC 81558     CHLORIDE            102 mmol/L  Unknown

 

          COMPREHENSIVE METABOLIC 48541     Bili Total           0.4 mg/dL  Unknown

 

          COMPREHENSIVE METABOLIC 06574     ALK PHOS            66 U/L    2018 Unknown

 

          COMPREHENSIVE METABOLIC 50349     SODIUM              135 mmol/L  Unknown

 

          COMPREHENSIVE METABOLIC 83335     CREATININE           1.01 mg/dL  Unknown

 

          COMPREHENSIVE METABOLIC 45275     CALCIUM             9.3 mg/dL 2018 Unknown

 

          COMPREHENSIVE METABOLIC 35981     POTASSIUM           4.8 mmol/L  Unknown

 

          COMPREHENSIVE METABOLIC 77787     Total Protein           7.0 g/dL   Unknown

 

          COMPREHENSIVE METABOLIC 48097     Glucose             91 mg/dL  2018 Unknown

 

          COMPREHENSIVE METABOLIC 53812     Bicarbonate           23 mmol/L  Unknown

 

          COMPREHENSIVE METABOLIC 52422     AGAP                10 mmol/L 2018 Unknown

 

          COMPLETE BLOOD COUNT [id number]   WBC                 7.5 10e9/L 20

18 Unknown

 

          COMPLETE BLOOD COUNT [id number]   RBC                 4.13 10e12/L 2018 Unknown

 

          COMPLETE BLOOD COUNT [id number]   HEMOGLOBIN           12.6 g/dL 20

18 Unknown

 

          COMPLETE BLOOD COUNT [id number]   HEMATOCRIT           38.4 %    20

18 Unknown

 

          COMPLETE BLOOD COUNT [id number]   MCV                 93.0 fL   

8 Unknown

 

          COMPLETE BLOOD COUNT [id number]   MCH                 30.5 pg   

8 Unknown

 

          COMPLETE BLOOD COUNT [id number]   MCHC                32.8 g/dL 

8 Unknown

 

          COMPLETE BLOOD COUNT [id number]   PLATELET COUNT           204 10e9/L  Unknown

 

          COMPLETE BLOOD COUNT [id number]   Mean Plt Volume           10.9 fL    Unknown

 

          COMPLETE BLOOD COUNT [id number]   Neut Auto           43.1 %    

8 Unknown

 

          COMPLETE BLOOD COUNT [id number]   Lymph Auto           45.0 %    20

18 Unknown

 

          COMPLETE BLOOD COUNT [id number]   Mono Auto           9.2 %     

8 Unknown

 

          COMPLETE BLOOD COUNT [id number]   RDW                 13.4 %    

8 Unknown

 

          COMPLETE BLOOD COUNT [id number]   Eos Auto            2.3 %     

8 Unknown

 

          COMPLETE BLOOD COUNT [id number]   Baso Auto           0.4 %     

8 Unknown

 

          COMPLETE BLOOD COUNT [id number]   Neutrophil Abs           3.23 10e9/L  Unknown

 

          COMPLETE BLOOD COUNT [id number]   Lymphocyte Abs           3.38 10e9/L  Unknown

 

          COMPLETE BLOOD COUNT [id number]   Monocyte Abs           0.69 10e9/L  Unknown

 

          COMPLETE BLOOD COUNT [id number]   Eosinophil Abs           0.17 10e9/L  Unknown

 

          COMPLETE BLOOD COUNT [id number]   RDW-SD              44.4 fL   

8 Unknown

 

          COMPLETE BLOOD COUNT [id number]   Basophil Abs           0.03 10e9/L  Unknown

 

          GFR CALC  7322099   GFR Non Afr Amr           53 mL/min 2018 Unk

nown

 

          GFR CALC  9376454   GFR Afr Amr           >60 mL/min 2018 Unknow

n

 

          GLYCOSYLATED HEMOGLOBIN TEST 73449     Hgb A1c   90804-8   5.4 %     0

2018 Unknown

 

          MEAN GLUC 0193643   Calc Mean Gluc           108 mg/dL 2018 Unkn

own

 

          MEAN GLUC 6487530   Calc Mean Gluc           114 mg/dL 2017 Unkn

own

 

          LIPID GROUP 49821     Cholesterol           146 mg/dL 2017 Unkno

wn

 

          LIPID GROUP 44784     Triglyceride           119 mg/dL 2017 Unkn

own

 

          LIPID GROUP 69094     HDL CHOLESTEROL           47 mg/dL  2017 U

nknown

 

          LIPID GROUP 62056     Chol/HDL Ratio           3.11 ratio 2017 U

nknown

 

          LIPID GROUP 08580     NON-HDL Chol           99 mg/dL  2017 Unkn

own

 

          LIPID GROUP 20012     LDL Cholesterol           75 mg/dL  2017 U

nknown

 

          GLYCOSYLATED HEMOGLOBIN TEST 20425     Hgb A1c   80283-6   5.6 %     0

2017 Unknown

 

          COMPREHENSIVE METABOLIC 40534     AST                 22 U/L    2017 Unknown

 

          COMPREHENSIVE METABOLIC 39124     ALT                 12 U/L    2017 Unknown

 

          COMPREHENSIVE METABOLIC 05009     BUN                 17 mg/dL  2017 Unknown

 

          COMPREHENSIVE METABOLIC 01531     ALBUMIN             4.0 g/dL  2017 Unknown

 

          COMPREHENSIVE METABOLIC 96689     CHLORIDE            110 mmol/L  Unknown

 

          COMPREHENSIVE METABOLIC 32405     Bili Total           0.4 mg/dL  Unknown

 

          COMPREHENSIVE METABOLIC 18044     ALK PHOS            63 U/L    2017 Unknown

 

          COMPREHENSIVE METABOLIC 64407     SODIUM              140 mmol/L  Unknown

 

          COMPREHENSIVE METABOLIC 90152     CREATININE           1.05 mg/dL  Unknown

 

          COMPREHENSIVE METABOLIC 14244     CALCIUM             9.2 mg/dL 2017 Unknown

 

          COMPREHENSIVE METABOLIC 98019     POTASSIUM           4.2 mmol/L  Unknown

 

          COMPREHENSIVE METABOLIC 16610     Total Protein           6.2 g/dL   Unknown

 

          COMPREHENSIVE METABOLIC 66648     Glucose             87 mg/dL  2017 Unknown

 

          COMPREHENSIVE METABOLIC 44275     Bicarbonate           24 mmol/L  Unknown

 

          COMPREHENSIVE METABOLIC 03146     AGAP                6 mmol/L  2017 Unknown

 

          GFR CALC  [id number]   GFR Non Afr Amr           51 mL/min 2017 Unk

nown

 

          GFR CALC  1809756   GFR Afr Amr           >60 mL/min 2017 Unknow

n

 

          COMPLETE BLOOD COUNT [id number]   WBC                 6.7 10e9/L 20

17 Unknown

 

          COMPLETE BLOOD COUNT [id number]   RBC                 4.04 10e12/L 2017 Unknown

 

          COMPLETE BLOOD COUNT [id number]   HEMOGLOBIN           12.1 g/dL 20

17 Unknown

 

          COMPLETE BLOOD COUNT [id number]   HEMATOCRIT           38.0 %    20

17 Unknown

 

          COMPLETE BLOOD COUNT [id number]   MCV                 94.1 fL   

7 Unknown

 

          COMPLETE BLOOD COUNT [id number]   MCH                 30.0 pg   

7 Unknown

 

          COMPLETE BLOOD COUNT [id number]   MCHC                31.8 g/dL 

7 Unknown

 

          COMPLETE BLOOD COUNT [id number]   PLATELET COUNT           206 10e9/L  Unknown

 

          COMPLETE BLOOD COUNT 7866915   Mean Plt Volume           11.3 fL    Unknown

 

          COMPLETE BLOOD COUNT [id number]   Neut Auto           35.8 %    

7 Unknown

 

          COMPLETE BLOOD COUNT [id number]   Lymph Auto           51.6 %    20

17 Unknown

 

          COMPLETE BLOOD COUNT [id number]   Mono Auto           8.8 %     

7 Unknown

 

          COMPLETE BLOOD COUNT [id number]   RDW                 13.5 %    

7 Unknown

 

          COMPLETE BLOOD COUNT [id number]   Eos Auto            3.4 %     

7 Unknown

 

          COMPLETE BLOOD COUNT [id number]   Baso Auto           0.4 %     

7 Unknown

 

          COMPLETE BLOOD COUNT [id number]   Neutrophil Abs           2.40 10e9/L  Unknown

 

          COMPLETE BLOOD COUNT 1049712   Lymphocyte Abs           3.46 10e9/L  Unknown

 

          COMPLETE BLOOD COUNT [id number]   Monocyte Abs           0.59 10e9/L  Unknown

 

          COMPLETE BLOOD COUNT [id number]   Eosinophil Abs           0.23 10e9/L  Unknown

 

          COMPLETE BLOOD COUNT [id number]   RDW-SD              45.3 fL   

7 Unknown

 

          COMPLETE BLOOD COUNT [id number]   Basophil Abs           0.03 10e9/L  Unknown

 

          THYROID STIMULATING HORMONE 97433     TSH                 1.981 uIU/mL

 2017 Unknown

 

          COMPLETE BLOOD COUNT [id number]   WBC                 6.0 10e9/L 20

17 Unknown

 

          COMPLETE BLOOD COUNT [id number]   RBC                 4.29 10e12/L 2017 Unknown

 

          COMPLETE BLOOD COUNT [id number]   HEMOGLOBIN           12.9 g/dL 20

17 Unknown

 

          COMPLETE BLOOD COUNT [id number]   HEMATOCRIT           38.4 %    20

17 Unknown

 

          COMPLETE BLOOD COUNT [id number]   MCV                 89.5 fL   

7 Unknown

 

          COMPLETE BLOOD COUNT [id number]   MCH                 30.1 pg   

7 Unknown

 

          COMPLETE BLOOD COUNT [id number]   MCHC                33.6 g/dL 

7 Unknown

 

          COMPLETE BLOOD COUNT [id number]   PLATELET COUNT           181 10e9/L 2017 Unknown

 

          COMPLETE BLOOD COUNT [id number]   Mean Plt Volume           11.7 fL   2017 Unknown

 

          COMPLETE BLOOD COUNT [id number]   Neut Auto           36.9 %    

7 Unknown

 

          COMPLETE BLOOD COUNT [id number]   Lymph Auto           50.4 %    20

17 Unknown

 

          COMPLETE BLOOD COUNT [id number]   Mono Auto           9.0 %     

7 Unknown

 

          COMPLETE BLOOD COUNT [id number]   RDW                 13.7 %    

7 Unknown

 

          COMPLETE BLOOD COUNT [id number]   Eos Auto            3.4 %     

7 Unknown

 

          COMPLETE BLOOD COUNT [id number]   Baso Auto           0.3 %     

7 Unknown

 

          COMPLETE BLOOD COUNT [id number]   Neutrophil Abs           2.21 10e9/L  Unknown

 

          COMPLETE BLOOD COUNT [id number]   Lymphocyte Abs           3.02 10e9/L  Unknown

 

          COMPLETE BLOOD COUNT [id number]   Monocyte Abs           0.54 10e9/L 2017 Unknown

 

          COMPLETE BLOOD COUNT [id number]   Eosinophil Abs           0.20 10e9/L  Unknown

 

          COMPLETE BLOOD COUNT [id number]   RDW-SD              44.0 fL   

7 Unknown

 

          COMPLETE BLOOD COUNT [id number]   Basophil Abs           0.02 10e9/L 2017 Unknown

 

          GLYCOSYLATED HEMOGLOBIN TEST 94177     Hgb A1c   64402-1   5.4 %     0

3/09/2017 Unknown

 

          THYROID STIMULATING HORMONE 29967     TSH                 2.200 uIU/mL

 2017 Unknown

 

          GFR CALC  8234199   GFR Non Afr Amr           50 mL/min 2017 Unk

nown

 

          GFR CALC  9468209   GFR Afr Amr           >60 mL/min 2017 Unknow

n

 

          MEAN GLUC 9556009   Calc Mean Gluc           108 mg/dL 2017 Unkn

own

 

          COMPREHENSIVE METABOLIC 95805     AST                 18 U/L    2017 Unknown

 

          COMPREHENSIVE METABOLIC 92386     ALT                 10 U/L    2017 Unknown

 

          COMPREHENSIVE METABOLIC 71578     BUN                 20 mg/dL  2017 Unknown

 

          COMPREHENSIVE METABOLIC 50355     ALBUMIN             4.1 g/dL  2017 Unknown

 

          COMPREHENSIVE METABOLIC 71866     CHLORIDE            109 mmol/L  Unknown

 

          COMPREHENSIVE METABOLIC 04746     Bili Total           0.6 mg/dL  Unknown

 

          COMPREHENSIVE METABOLIC 03328     ALK PHOS            64 U/L    2017 Unknown

 

          COMPREHENSIVE METABOLIC 23005     SODIUM              141 mmol/L  Unknown

 

          COMPREHENSIVE METABOLIC 41558     CREATININE           1.06 mg/dL 2017 Unknown

 

          COMPREHENSIVE METABOLIC 74286     CALCIUM             9.9 mg/dL 2017 Unknown

 

          COMPREHENSIVE METABOLIC 00400     POTASSIUM           4.2 mmol/L  Unknown

 

          COMPREHENSIVE METABOLIC 24896     Total Protein           6.3 g/dL   Unknown

 

          COMPREHENSIVE METABOLIC 44853     Glucose             99 mg/dL  2017 Unknown

 

          COMPREHENSIVE METABOLIC 45513     Bicarbonate           21 mmol/L 2017 Unknown

 

          COMPREHENSIVE METABOLIC 87532     AGAP                11 mmol/L 2017 Unknown

 

          LIPID GROUP [id number]     Cholesterol           169 mg/dL 2016 Unkno

wn

 

          LIPID GROUP [id number]     Triglyceride           165 mg/dL 2016 Unkn

own

 

          LIPID GROUP [id number]     HDL CHOLESTEROL           43 mg/dL  2016 U

nknown

 

          LIPID GROUP [id number]     Chol/HDL Ratio           3.93 ratio 2016 U

nknown

 

          LIPID GROUP 65178     NON-HDL Chol           126 mg/dL 2016 Unkn

own

 

          LIPID GROUP 26471     LDL Cholesterol           93 mg/dL  2016 U

nknown

 

          COMPREHENSIVE METABOLIC 06745     AST                 18 U/L    2016 Unknown

 

          COMPREHENSIVE METABOLIC 61110     ALT                 10 U/L    2016 Unknown

 

          COMPREHENSIVE METABOLIC 87798     BUN                 20 mg/dL  2016 Unknown

 

          COMPREHENSIVE METABOLIC 90038     ALBUMIN             3.9 g/dL  2016 Unknown

 

          COMPREHENSIVE METABOLIC 82852     CHLORIDE            110 mmol/L  Unknown

 

          COMPREHENSIVE METABOLIC 53822     Bili Total           0.5 mg/dL  Unknown

 

          COMPREHENSIVE METABOLIC 05958     ALK PHOS            72 U/L    2016 Unknown

 

          COMPREHENSIVE METABOLIC 84071     SODIUM              141 mmol/L  Unknown

 

          COMPREHENSIVE METABOLIC 85890     CREATININE           1.12 mg/dL  Unknown

 

          COMPREHENSIVE METABOLIC 07375     CALCIUM             9.7 mg/dL 2016 Unknown

 

          COMPREHENSIVE METABOLIC 05616     POTASSIUM           4.4 mmol/L  Unknown

 

          COMPREHENSIVE METABOLIC 37945     Total Protein           6.2 g/dL   Unknown

 

          COMPREHENSIVE METABOLIC 71066     Glucose             90 mg/dL  2016 Unknown

 

          COMPREHENSIVE METABOLIC 68961     Bicarbonate           23 mmol/L  Unknown

 

          COMPREHENSIVE METABOLIC 47954     AGAP                8 mmol/L  2016 Unknown

 

          GFR CALC  1907696   GFR Non Afr Amr           47 mL/min 2016 Unk

nown

 

          GFR CALC  1987327   GFR Afr Amr           57 mL/min 2016 Unknown

 

          GLYCOSYLATED HEMOGLOBIN TEST 87540     Hgb A1c   57689-9   5.5 %     0

2016 Unknown

 

          THYROID STIMULATING HORMONE 98070     TSH                 2.537 uIU/mL

 2016 Unknown

 

          FREE T4   35024     T4 Free             1.36 ng/dL 2016 Unknown

 

          COMPLETE BLOOD COUNT 7661464   WBC                 6.8 10e9/L 20

16 Unknown

 

          COMPLETE BLOOD COUNT [id number]   RBC                 4.20 10e12/L 2016 Unknown

 

          COMPLETE BLOOD COUNT 5421821   HEMOGLOBIN           12.5 g/dL 20

16 Unknown

 

          COMPLETE BLOOD COUNT 1141592   HEMATOCRIT           38.0 %    20

16 Unknown

 

          COMPLETE BLOOD COUNT 6098947   MCV                 90.5 fL   

6 Unknown

 

          COMPLETE BLOOD COUNT [id number]   MCH                 29.8 pg   

6 Unknown

 

          COMPLETE BLOOD COUNT 6460511   MCHC                32.9 g/dL 

6 Unknown

 

          COMPLETE BLOOD COUNT 8472242   PLATELET COUNT           197 10e9/L  Unknown

 

          COMPLETE BLOOD COUNT 5761897   Mean Plt Volume           11.7 fL    Unknown

 

          COMPLETE BLOOD COUNT [id number]   Neut Auto           41.3 %    

6 Unknown

 

          COMPLETE BLOOD COUNT [id number]   Lymph Auto           47.1 %    20

16 Unknown

 

          COMPLETE BLOOD COUNT 7003517   Mono Auto           7.8 %     

6 Unknown

 

          COMPLETE BLOOD COUNT [id number]   RDW                 13.8 %    

6 Unknown

 

          COMPLETE BLOOD COUNT [id number]   Eos Auto            3.4 %     

6 Unknown

 

          COMPLETE BLOOD COUNT 7793469   Baso Auto           0.4 %     

6 Unknown

 

          COMPLETE BLOOD COUNT [id number]   Neutrophil Abs           2.81 10e9/L  Unknown

 

          COMPLETE BLOOD COUNT 0906909   Lymphocyte Abs           3.20 10e9/L  Unknown

 

          COMPLETE BLOOD COUNT [id number]   Monocyte Abs           0.53 10e9/L  Unknown

 

          COMPLETE BLOOD COUNT [id number]   Eosinophil Abs           0.23 10e9/L  Unknown

 

          COMPLETE BLOOD COUNT [id number]   RDW-SD              44.4 fL   

6 Unknown

 

          COMPLETE BLOOD COUNT 3890572   Basophil Abs           0.03 10e9/L  Unknown

 

          MEAN GLUC 2884798   Calc Mean Gluc           111 mg/dL 2016 Unkn

own

 

          [place] [address]     Glucose             89 MG/DL   Unknown

 

          [place] [address]     CREATININE           1.12 MG/DL  Unknown

 

          [place] [address]     BUN                 20 MG/DL   Unknown

 

          [place] [address]     SODIUM              139 MMOL/L  Unknown

 

          [place] [address]     POTASSIUM           4.6 MMOL/L  Unknown

 

          [place] [address]     CHLORIDE            108 MMOL/L  Unknown

 

          [place] [address]     BICARB              26 MMOL/L  Unknown

 

          [place] [address]     ANION GAP           5 MEQ/L    Unknown

 

          [place] [address]     CALCIUM             10.0 MG/DL  Unknown

 

          GFR CALC  9287590   GFR AA              57.0L ML/MIN 2015 Unknow

n

 

          GFR CALC  8028888   GFR NON-AA           47.0L ML/MIN 2015 Unkno

wn

 

          THYROID STIMULATING HORMONE 94514     TSH                 2.378 uIU/ML

 09/10/2015 Unknown

 

          COMPLETE BLOOD COUNT [id number]   WBC                 6.4 10e9/L 09/10/20

15 Unknown

 

          COMPLETE BLOOD COUNT [id number]   RBC                 3.99 10e12/L 09/10/

2015 Unknown

 

          COMPLETE BLOOD COUNT 6012287   HGB                 11.9 g/dL 09/10/201

5 Unknown

 

          COMPLETE BLOOD COUNT [id number]   HCT DET             36.9 %    09/10/201

5 Unknown

 

          COMPLETE BLOOD COUNT [id number]   MCV                 92.5 fL   09/10/201

5 Unknown

 

          COMPLETE BLOOD COUNT [id number]   MCH                 29.8 pg   09/10/201

5 Unknown

 

          COMPLETE BLOOD COUNT [id number]   MCHC                32.2 g/dL 09/10/201

5 Unknown

 

          COMPLETE BLOOD COUNT [id number]   PLT                 172 10e9/L 09/10/20

15 Unknown

 

          COMPLETE BLOOD COUNT [id number]   MPV                 11.7 fL   09/10/201

5 Unknown

 

          COMPLETE BLOOD COUNT 9699694   ARIK %               40.4 %    09/10/201

5 Unknown

 

          COMPLETE BLOOD COUNT 6369799   LY %                48.0 %    09/10/201

5 Unknown

 

          COMPLETE BLOOD COUNT [id number]   MON %               8.3 %     09/10/201

5 Unknown

 

          COMPLETE BLOOD COUNT [id number]   EOS  %              2.8 %     09/10/201

5 Unknown

 

          COMPLETE BLOOD COUNT [id number]   BASO %              0.5 %     09/10/201

5 Unknown

 

          COMPLETE BLOOD COUNT [id number]   RDW                 13.6 %    09/10/201

5 Unknown

 

          COMPLETE BLOOD COUNT [id number]   ABS ARIK             2.59 10e9/L 09/10/2

015 Unknown

 

          COMPLETE BLOOD COUNT [id number]   ABS LYMPH           3.07 10e9/L 09/10/2

015 Unknown

 

          COMPLETE BLOOD COUNT [id number]   ABS MONO            0.53 10e9/L 09/10/2

015 Unknown

 

          COMPLETE BLOOD COUNT [id number]   ABS EOS             0.18 10e9/L 09/10/2

015 Unknown

 

          COMPLETE BLOOD COUNT [id number]   ABS BASO            0.03 10e9/L 09/10/2

015 Unknown

 

          COMPLETE BLOOD COUNT [id number]   RDW-SD              44.9 fL   09/10/201

5 Unknown

 

          LIPID GROUP [id number]     HDL TEST            42 MG/DL  09/10/2015 Unknown

 

          LIPID GROUP [id number]     TRIG                177 MG/DL 09/10/2015 Unknown

 

          LIPID GROUP 68453     TEST LDL            72 MG/DL  09/10/2015 Unknown

 

          LIPID GROUP 83978     CHOL                149 MG/DL 09/10/2015 Unknown

 

          LIPID GROUP [id number]     RCHOL/HDL           3.55 RATIO 09/10/2015 Unknow

n

 

          LIPID GROUP [id number]     NON-HDL CH           107 MG/DL 09/10/2015 Unknow

n

 

          GLYCOSYLATED HEMOGLOBIN TEST 90815     A1C HPLC  36855-6   5.5 %     0

9/10/2015 Unknown

 

          FREE T4   53522     FREE T4             1.39 NG/DL 09/10/2015 Unknown

 

          GFR CALC  9475130   GFR AA              55.0L ML/MIN 09/10/2015 Unknow

n

 

          GFR CALC  6514758   GFR NON-AA           46.0L ML/MIN 09/10/2015 Unkno

wn

 

          COMPREHENSIVE METABOLIC 14110     AST                 17 U/L    09/10/

2015 Unknown

 

          COMPREHENSIVE METABOLIC 00499     ALT                 10 IU/L   09/10/

2015 Unknown

 

          COMPREHENSIVE METABOLIC 12628     BUN                 20 MG/DL  09/10/

2015 Unknown

 

          COMPREHENSIVE METABOLIC 60291     ALBUMIN             3.9 GM/DL 09/10/

2015 Unknown

 

          COMPREHENSIVE METABOLIC 05122     CHLORIDE            111 MMOL/L 09/10

/2015 Unknown

 

          COMPREHENSIVE METABOLIC 77447     BILI TOT            0.4 MG/DL 09/10/

2015 Unknown

 

          COMPREHENSIVE METABOLIC 99962     ALK PHOS            70 U/L    09/10/

2015 Unknown

 

          COMPREHENSIVE METABOLIC 68149     SODIUM              142 MMOL/L 09/10

/2015 Unknown

 

          COMPREHENSIVE METABOLIC 26367     CREATININE           1.16 MG/DL  Unknown

 

          COMPREHENSIVE METABOLIC 99103     CALCIUM             9.4 MG/DL 09/10/

2015 Unknown

 

          COMPREHENSIVE METABOLIC 33669     POTASSIUM           4.6 MMOL/L 09/10

/2015 Unknown

 

          COMPREHENSIVE METABOLIC 88998     PROT TOT            6.2 GM/DL 09/10/

2015 Unknown

 

          COMPREHENSIVE METABOLIC 97957     Glucose             90 MG/DL  09/10/

2015 Unknown

 

          COMPREHENSIVE METABOLIC 43384     BICARB              24 MMOL/L 09/10/

2015 Unknown

 

          COMPREHENSIVE METABOLIC 25960     ANION GAP           7 MEQ/L   09/10/

2015 Unknown

 

          THYROID STIMULATING HORMONE 34672     TSH                 2.427 uIU/ML

 2015 Unknown

 

          LIPID GROUP 88633     HDL TEST            47 MG/DL  2015 Unknown

 

          LIPID GROUP [id number]     TRIG                145 MG/DL 2015 Unknown

 

          LIPID GROUP 28786     TEST LDL            73 MG/DL  2015 Unknown

 

          LIPID GROUP 09810     CHOL                149 MG/DL 2015 Unknown

 

          LIPID GROUP 33790     RCHOL/HDL           3.17 RATIO 2015 Unknow

n

 

          LIPID GROUP 56121     NON-HDL CH           102 MG/DL 2015 Unknow

n

 

          COMPREHENSIVE METABOLIC 53147     AST                 17 U/L    2015 Unknown

 

          COMPREHENSIVE METABOLIC 67667     ALT                 9 IU/L    2015 Unknown

 

          COMPREHENSIVE METABOLIC 28348     BUN                 19 MG/DL  2015 Unknown

 

          COMPREHENSIVE METABOLIC 28552     ALBUMIN             4.3 GM/DL 2015 Unknown

 

          COMPREHENSIVE METABOLIC 39198     CHLORIDE            108 MMOL/L  Unknown

 

          COMPREHENSIVE METABOLIC 52508     BILI TOT            0.5 MG/DL 2015 Unknown

 

          COMPREHENSIVE METABOLIC 17729     ALK PHOS            68 U/L    2015 Unknown

 

          COMPREHENSIVE METABOLIC 58055     SODIUM              140 MMOL/L  Unknown

 

          COMPREHENSIVE METABOLIC 86453     CREATININE           1.08 MG/DL 2015 Unknown

 

          COMPREHENSIVE METABOLIC 13512     CALCIUM             9.9 MG/DL 2015 Unknown

 

          COMPREHENSIVE METABOLIC 46053     POTASSIUM           4.3 MMOL/L  Unknown

 

          COMPREHENSIVE METABOLIC 15953     PROT TOT            7.2 GM/DL 2015 Unknown

 

          COMPREHENSIVE METABOLIC 91812     Glucose             94 MG/DL  2015 Unknown

 

          COMPREHENSIVE METABOLIC 18622     BICARB              26 MMOL/L 2015 Unknown

 

          COMPREHENSIVE METABOLIC 45812     ANION GAP           6 MEQ/L   2015 Unknown

 

          GFR CALC  7819358   GFR AA              60.0L ML/MIN 2015 Unknow

n

 

          GFR CALC  6931301   GFR NON-AA           49.0L ML/MIN 2015 Unkno

wn

 

          GLYCOSYLATED HEMOGLOBIN TEST 56121     A1C HPLC  18462-6   5.6 %     0

3/06/2015 Unknown

 

          COMPLETE BLOOD COUNT 9647801   WBC                 7.2 10e9/L 20

15 Unknown

 

          COMPLETE BLOOD COUNT 6697447   RBC                 4.28 10e12/L 2015 Unknown

 

          COMPLETE BLOOD COUNT 7915603   HGB                 12.8 g/dL 

5 Unknown

 

          COMPLETE BLOOD COUNT 0708707   HCT DET             39.3 %    

5 Unknown

 

          COMPLETE BLOOD COUNT 1682681   MCV                 91.8 fL   

5 Unknown

 

          COMPLETE BLOOD COUNT 5696516   MCH                 29.9 pg   

5 Unknown

 

          COMPLETE BLOOD COUNT 4672746   MCHC                32.6 g/dL 

5 Unknown

 

          COMPLETE BLOOD COUNT [id number]   PLT                 189 10e9/L 20

15 Unknown

 

          COMPLETE BLOOD COUNT 5869203   MPV                 11.2 fL   

5 Unknown

 

          COMPLETE BLOOD COUNT [id number]   ARIK %               38.0 %    

5 Unknown

 

          COMPLETE BLOOD COUNT 6946273   LY %                51.0 %    

5 Unknown

 

          COMPLETE BLOOD COUNT 9144285   MON %               7.7 %     

5 Unknown

 

          COMPLETE BLOOD COUNT 7546980   EOS  %              2.9 %     

5 Unknown

 

          COMPLETE BLOOD COUNT 1670935   BASO %              0.4 %     

5 Unknown

 

          COMPLETE BLOOD COUNT 6402839   RDW                 14.0 %    

5 Unknown

 

          COMPLETE BLOOD COUNT 5201447   ABS ARIK             2.74 10e9/L 

015 Unknown

 

          COMPLETE BLOOD COUNT 8192346   ABS LYMPH           3.67 10e9/L 

015 Unknown

 

          COMPLETE BLOOD COUNT 3618903   ABS MONO            0.55 10e9/L 

015 Unknown

 

          COMPLETE BLOOD COUNT [id number]   ABS EOS             0.21 10e9/L 

015 Unknown

 

          COMPLETE BLOOD COUNT 8263586   ABS BASO            0.03 10e9/L 

015 Unknown

 

          COMPLETE BLOOD COUNT 5554472   RDW-SD              46.1 fL   

5 Unknown

 

          FREE T4   07448     FREE T4             1.14 NG/DL 2015 Unknown

 

          GLYCOSYLATED HEMOGLOBIN TEST 27399     A1C HPLC  36335-7   5.2 %     0

2014 Unknown

 

          FREE T4   34528     FREE T4             1.40 NG/DL 2014 Unknown

 

          GFR CALC  6837728   GFR AA              >60 ML/MIN 2014 Unknown

 

          GFR CALC  3940423   GFR NON-AA           52.0L ML/MIN 2014 Unkno

wn

 

          COMPREHENSIVE METABOLIC 57527     AST                 15 U/L    2014 Unknown

 

          COMPREHENSIVE METABOLIC 75805     ALT                 9 IU/L    2014 Unknown

 

          COMPREHENSIVE METABOLIC 69131     BUN                 17 MG/DL  2014 Unknown

 

          COMPREHENSIVE METABOLIC 20630     ALBUMIN             4.0 GM/DL 2014 Unknown

 

          COMPREHENSIVE METABOLIC 23023     CHLORIDE            112 MMOL/L  Unknown

 

          COMPREHENSIVE METABOLIC 80231     BILI TOT            0.5 MG/DL 2014 Unknown

 

          COMPREHENSIVE METABOLIC 53155     ALK PHOS            66 U/L    2014 Unknown

 

          COMPREHENSIVE METABOLIC 63049     SODIUM              140 MMOL/L  Unknown

 

          COMPREHENSIVE METABOLIC 47255     CREATININE           1.03 MG/DL  Unknown

 

          COMPREHENSIVE METABOLIC 15738     CALCIUM             9.5 MG/DL 2014 Unknown

 

          COMPREHENSIVE METABOLIC 85265     POTASSIUM           4.1 MMOL/L  Unknown

 

          COMPREHENSIVE METABOLIC 55877     PROT TOT            6.2 GM/DL 2014 Unknown

 

          COMPREHENSIVE METABOLIC 11931     Glucose             102 MG/DL 2014 Unknown

 

          COMPREHENSIVE METABOLIC 79083     BICARB              23 MMOL/L 2014 Unknown

 

          COMPREHENSIVE METABOLIC 09967     ANION GAP           5 MEQ/L   2014 Unknown

 

          THYROID STIMULATING HORMONE 16370     TSH                 2.074 uIU/ML

 2014 Unknown

 

          VITAMIN B 12 FOLIC ACID [id number] VIT B 12            423 PG/ML  Unknown

 

          VITAMIN B 12 FOLIC ACID [id number] FOLIC ACID           19.7 NG/ML  Unknown

 

          LIPID GROUP 35843     HDL TEST            40 MG/DL  2014 Unknown

 

          LIPID GROUP 16394     TRIG                145 MG/DL 2014 Unknown

 

          LIPID GROUP 86817     TEST LDL            81 MG/DL  2014 Unknown

 

          LIPID GROUP 72916     CHOL                150 MG/DL 2014 Unknown

 

          LIPID GROUP 72885     RCHOL/HDL           3.75 RATIO 2014 Unknow

n

 

          COMPLETE BLOOD COUNT [id number]   WBC                 6.0 10e9/L 20

14 Unknown

 

          COMPLETE BLOOD COUNT 1158641   RBC                 4.26 10e12/L 2014 Unknown

 

          COMPLETE BLOOD COUNT 8298136   HGB                 12.7 g/dL 

4 Unknown

 

          COMPLETE BLOOD COUNT 1196191   HCT DET             38.7 %    

4 Unknown

 

          COMPLETE BLOOD COUNT [id number]   MCV                 90.8 fL   

4 Unknown

 

          COMPLETE BLOOD COUNT 3991281   MCH                 29.8 pg   

4 Unknown

 

          COMPLETE BLOOD COUNT [id number]   MCHC                32.8 g/dL 

4 Unknown

 

          COMPLETE BLOOD COUNT [id number]   PLT                 178 10e9/L 20

14 Unknown

 

          COMPLETE BLOOD COUNT [id number]   MPV                 11.7 fL   

4 Unknown

 

          COMPLETE BLOOD COUNT [id number]   ARIK %               30.5 %    

4 Unknown

 

          COMPLETE BLOOD COUNT [id number]   LY %                55.4 %    

4 Unknown

 

          COMPLETE BLOOD COUNT [id number]   MON %               9.0 %     

4 Unknown

 

          COMPLETE BLOOD COUNT [id number]   EOS  %              4.4 %     

4 Unknown

 

          COMPLETE BLOOD COUNT [id number]   BASO %              0.7 %     

4 Unknown

 

          COMPLETE BLOOD COUNT [id number]   RDW                 13.3 %    

4 Unknown

 

          COMPLETE BLOOD COUNT [id number]   ABS ARIK             1.83 10e9/L 

014 Unknown

 

          COMPLETE BLOOD COUNT [id number]   ABS LYMPH           3.32 10e9/L 

014 Unknown

 

          COMPLETE BLOOD COUNT [id number]   ABS MONO            0.54 10e9/L 

014 Unknown

 

          COMPLETE BLOOD COUNT [id number]   ABS EOS             0.26 10e9/L 

014 Unknown

 

          COMPLETE BLOOD COUNT [id number]   ABS BASO            0.04 10e9/L 

014 Unknown

 

          COMPLETE BLOOD COUNT [id number]   RDW-SD              43.2 fL   

4 Unknown

 

          HEMOGLOBIN A1C (GLYCOSYLATED) 1162490   A1C HPLC  74419-5   5.5 %     

2012 Unknown

 

          COMPLETE BLOOD COUNT [id number]   WBC                 6.0 10e9/L 20

12 Unknown

 

          COMPLETE BLOOD COUNT [id number]   RBC                 4.22 10e12/L 2012 Unknown

 

          COMPLETE BLOOD COUNT [id number]   HGB                 12.4 g/dL 

2 Unknown

 

          COMPLETE BLOOD COUNT [id number]   HCT DET             38.2 %    

2 Unknown

 

          COMPLETE BLOOD COUNT [id number]   MCV                 90.5 fL   

2 Unknown

 

          COMPLETE BLOOD COUNT [id number]   MCH                 29.4 pg   

2 Unknown

 

          COMPLETE BLOOD COUNT [id number]   MCHC                32.5 g/dL 

2 Unknown

 

          COMPLETE BLOOD COUNT [id number]   PLT                 187 10e9/L 20

12 Unknown

 

          COMPLETE BLOOD COUNT [id number]   MPV                 11.5 fL   

2 Unknown

 

          COMPLETE BLOOD COUNT [id number]   ARIK %               36.4 %    

2 Unknown

 

          COMPLETE BLOOD COUNT [id number]   LY %                51.0 %    

2 Unknown

 

          COMPLETE BLOOD COUNT [id number]   MON %               8.7 %     

2 Unknown

 

          COMPLETE BLOOD COUNT [id number]   EOS  %              3.2 %     

2 Unknown

 

          COMPLETE BLOOD COUNT 6238312   BASO %              0.7 %     

2 Unknown

 

          COMPLETE BLOOD COUNT 1379165   RDW                 13.7 %    

2 Unknown

 

          COMPLETE BLOOD COUNT [id number]   ABS ARIK             2.18 10e9/L 

012 Unknown

 

          COMPLETE BLOOD COUNT [id number]   ABS LYMPH           3.06 10e9/L 

012 Unknown

 

          COMPLETE BLOOD COUNT [id number]   ABS MONO            0.52 10e9/L 

012 Unknown

 

          COMPLETE BLOOD COUNT [id number]   ABS EOS             0.19 10e9/L 

012 Unknown

 

          COMPLETE BLOOD COUNT [id number]   ABS BASO            0.04 10e9/L 

012 Unknown

 

          COMPLETE BLOOD COUNT [id number]   RDW-SD              44.3 fL   

2 Unknown

 

          LIPID GROUP [id number]     HDL TEST            42 MG/DL  2012 Unknown

 

          LIPID GROUP [id number]     TRIG                156 MG/DL 2012 Unknown

 

          LIPID GROUP [id number]     TEST LDL            80 MG/DL  2012 Unknown

 

          LIPID GROUP [id number]     CHOL                153 MG/DL 2012 Unknown

 

          LIPID GROUP [id number]     RCHOL/HDL           3.64 RATIO 2012 Unknow

n

 

          FREE T4   20325     FREE T4             1.22 NG/DL 2012 Unknown

 

          COMPREHENSIVE METABOLIC 06535     AST                 20 U/L    2012 Unknown

 

          COMPREHENSIVE METABOLIC 52809     ALT                 11 IU/L   2012 Unknown

 

          COMPREHENSIVE METABOLIC 57025     BUN                 19 MG/DL  2012 Unknown

 

          COMPREHENSIVE METABOLIC 86130     ALBUMIN             4.3 GM/DL 2012 Unknown

 

          COMPREHENSIVE METABOLIC 23251     CHLORIDE            109 MMOL/L  Unknown

 

          COMPREHENSIVE METABOLIC 19642     BILI TOT            0.6 MG/DL 2012 Unknown

 

          COMPREHENSIVE METABOLIC 14021     ALK PHOS            84 U/L    2012 Unknown

 

          COMPREHENSIVE METABOLIC 52710     SODIUM              142 MMOL/L  Unknown

 

          COMPREHENSIVE METABOLIC 40652     CREATININE           1.09 MG/DL  Unknown

 

          COMPREHENSIVE METABOLIC 79820     CALCIUM             9.8 MG/DL 2012 Unknown

 

          COMPREHENSIVE METABOLIC 97227     POTASSIUM           4.2 MMOL/L  Unknown

 

          COMPREHENSIVE METABOLIC 38892     PROT TOT            6.4 GM/DL 2012 Unknown

 

          COMPREHENSIVE METABOLIC 39262     Glucose             89 MG/DL  2012 Unknown

 

          COMPREHENSIVE METABOLIC 07349     BICARB              25 MMOL/L 2012 Unknown

 

          COMPREHENSIVE METABOLIC 74963     ANION GAP           8 MEQ/L   2012 Unknown

 

          GFR CALC  8650866   GFR AA              60.0L ML/MIN 2012 Unknow

n

 

          GFR CALC  2652927   GFR NON-AA           49.0L ML/MIN 2012 Unkno

wn

 

          THYROID STIMULATING HORMONE 45066     TSH                 2.450 uIU/ML

 2012 Unknown

 

          COMPREHENSIVE METABOLIC 22151     AST                 22 U/L    2012 Unknown

 

          COMPREHENSIVE METABOLIC 36026     ALT                 14 IU/L   2012 Unknown

 

          COMPREHENSIVE METABOLIC 39522     BUN                 21 MG/DL  2012 Unknown

 

          COMPREHENSIVE METABOLIC 63207     ALBUMIN             4.3 GM/DL 2012 Unknown

 

          COMPREHENSIVE METABOLIC 22564     CHLORIDE            106 MMOL/L  Unknown

 

          COMPREHENSIVE METABOLIC 72312     BILI TOT            0.4 MG/DL 2012 Unknown

 

          COMPREHENSIVE METABOLIC 55281     ALK PHOS            80 U/L    2012 Unknown

 

          COMPREHENSIVE METABOLIC 99151     SODIUM              141 MMOL/L  Unknown

 

          COMPREHENSIVE METABOLIC 78285     CREATININE           1.13 MG/DL  Unknown

 

          COMPREHENSIVE METABOLIC 17387     CALCIUM             9.4 MG/DL 2012 Unknown

 

          COMPREHENSIVE METABOLIC 88436     POTASSIUM           4.3 MMOL/L  Unknown

 

          COMPREHENSIVE METABOLIC 41558     PROT TOT            6.7 GM/DL 2012 Unknown

 

          COMPREHENSIVE METABOLIC 39029     Glucose             98 MG/DL  2012 Unknown

 

          COMPREHENSIVE METABOLIC 30581     BICARB              25 MMOL/L 2012 Unknown

 

          COMPREHENSIVE METABOLIC 27951     ANION GAP           10 MEQ/L  2012 Unknown

 

          LIPID GROUP [id number]     HDL TEST            44 MG/DL  2012 Unknown

 

          LIPID GROUP [id number]     TRIG                164 MG/DL 2012 Unknown

 

          LIPID GROUP [id number]     TEST LDL            98 MG/DL  2012 Unknown

 

          LIPID GROUP [id number]     CHOL                175 MG/DL 2012 Unknown

 

          LIPID GROUP [id number]     RCHOL/HDL           3.98 RATIO 2012 Unknow

n

 

          COMPLETE BLOOD COUNT 87103     WBC                 6.7 10e9/L 20

12 Unknown

 

          COMPLETE BLOOD COUNT 60243     RBC                 4.36 10e12/L 2012 Unknown

 

          COMPLETE BLOOD COUNT 95058     HGB                 12.9 g/dL 

2 Unknown

 

          COMPLETE BLOOD COUNT 93691     HCT DET             39.4 %    

2 Unknown

 

          COMPLETE BLOOD COUNT 12428     MCV                 90.4 fL   

2 Unknown

 

          COMPLETE BLOOD COUNT 73298     MCH                 29.6 pg   

2 Unknown

 

          COMPLETE BLOOD COUNT 67245     MCHC                32.7 g/dL 

2 Unknown

 

          COMPLETE BLOOD COUNT 94189     PLT                 184 10e9/L 20

12 Unknown

 

          COMPLETE BLOOD COUNT 43196     MPV                 10.9 fL   

2 Unknown

 

          COMPLETE BLOOD COUNT 40975     ARIK %               41.5 %    

2 Unknown

 

          COMPLETE BLOOD COUNT 34606     LY %                45.7 %    

2 Unknown

 

          COMPLETE BLOOD COUNT 64497     MON %               9.4 %     

2 Unknown

 

          COMPLETE BLOOD COUNT 94886     EOS  %              3.0 %     

2 Unknown

 

          COMPLETE BLOOD COUNT 42780     BASO %              0.4 %     

2 Unknown

 

          COMPLETE BLOOD COUNT 68522     RDW                 13.2 %    

2 Unknown

 

          COMPLETE BLOOD COUNT 25949     ABS ARIK             2.78 10e9/L 

012 Unknown

 

          COMPLETE BLOOD COUNT 46087     ABS LYMPH           3.06 10e9/L 

012 Unknown

 

          COMPLETE BLOOD COUNT 11758     ABS MONO            0.63 10e9/L 

012 Unknown

 

          COMPLETE BLOOD COUNT 45771     ABS EOS             0.20 10e9/L 

012 Unknown

 

          COMPLETE BLOOD COUNT 68902     ABS BASO            0.03 10e9/L 

012 Unknown

 

          COMPLETE BLOOD COUNT 09731     RDW-SD              42.3 fL   

2 Unknown

 

          GFR CALC  4422876   GFR AA              57.0L ML/MIN 2012 Unknow

n

 

          GFR CALC  5917205   GFR NON-AA           47.0L ML/MIN 2012 Unkno

wn

 

          THYROID STIMULATING HORMONE 27901     TSH                 2.663 uIU/ML

 2012 Unknown

 

          FREE T4   50877     FREE T4             1.15 NG/DL 2012 Unknown

 

          THYROID STIMULATING HORMONE 83639     TSH                 1.908 uIU/ML

 2011 Unknown

 

          COMPLETE BLOOD COUNT 54238     WBC                 6.4 10e9/L 20

11 Unknown

 

          COMPLETE BLOOD COUNT 93086     RBC                 3.92 10e12/L 2011 Unknown

 

          COMPLETE BLOOD COUNT 26573     HGB                 11.8 g/dL 

1 Unknown

 

          COMPLETE BLOOD COUNT 21088     HCT DET             36.0 %    

1 Unknown

 

          COMPLETE BLOOD COUNT 11334     MCV                 91.8 fL   

1 Unknown

 

          COMPLETE BLOOD COUNT 14679     MCH                 30.1 pg   

1 Unknown

 

          COMPLETE BLOOD COUNT 53651     MCHC                32.8 g/dL 

1 Unknown

 

          COMPLETE BLOOD COUNT 06999     PLT                 176 10e9/L 20

11 Unknown

 

          COMPLETE BLOOD COUNT 41580     MPV                 11.4 fL   

1 Unknown

 

          COMPLETE BLOOD COUNT 89990     ARIK %               50.4 %    

1 Unknown

 

          COMPLETE BLOOD COUNT 32178     LY %                35.5 %    

1 Unknown

 

          COMPLETE BLOOD COUNT 92953     MON %               10.2 %    

1 Unknown

 

          COMPLETE BLOOD COUNT 04185     EOS  %              3.3 %     

1 Unknown

 

          COMPLETE BLOOD COUNT 81468     BASO %              0.6 %     

1 Unknown

 

          COMPLETE BLOOD COUNT 57979     RDW                 13.7 %    

1 Unknown

 

          COMPLETE BLOOD COUNT 36514     ABS ARIK             3.23 10e9/L 

011 Unknown

 

          COMPLETE BLOOD COUNT 97532     ABS LYMPH           2.27 10e9/L 

011 Unknown

 

          COMPLETE BLOOD COUNT 27396     ABS MONO            0.65 10e9/L 

011 Unknown

 

          COMPLETE BLOOD COUNT 99018     ABS EOS             0.21 10e9/L 

011 Unknown

 

          COMPLETE BLOOD COUNT 76813     ABS BASO            0.04 10e9/L 

011 Unknown

 

          COMPLETE BLOOD COUNT 25979     RDW-SD              45.3 fL   

1 Unknown

 

          GFR CALC  7923081   GFR AA              >60 ML/MIN 2011 Unknown

 

          GFR CALC  7038448   GFR NON-AA           53.0L ML/MIN 2011 Unkno

wn

 

          FREE T4   17192     FREE T4             1.20 NG/DL 2011 Unknown

 

          COMPREHENSIVE METABOLIC 34671     AST                 17 U/L    2011 Unknown

 

          COMPREHENSIVE METABOLIC 85337     ALT                 9 IU/L    2011 Unknown

 

          COMPREHENSIVE METABOLIC 89537     BUN                 16 MG/DL  2011 Unknown

 

          COMPREHENSIVE METABOLIC 16111     ALBUMIN             4.0 GM/DL 2011 Unknown

 

          COMPREHENSIVE METABOLIC 15503     CHLORIDE            108 MMOL/L  Unknown

 

          COMPREHENSIVE METABOLIC 61301     BILI TOT            0.5 MG/DL 2011 Unknown

 

          COMPREHENSIVE METABOLIC 23665     ALK PHOS            76 U/L    2011 Unknown

 

          COMPREHENSIVE METABOLIC 31595     SODIUM              139 MMOL/L  Unknown

 

          COMPREHENSIVE METABOLIC 85983     CREATININE           1.02 MG/DL 2011 Unknown

 

          COMPREHENSIVE METABOLIC 37081     CALCIUM             9.2 MG/DL 2011 Unknown

 

          COMPREHENSIVE METABOLIC 05962     POTASSIUM           4.5 MMOL/L  Unknown

 

          COMPREHENSIVE METABOLIC 15549     PROT TOT            6.1 GM/DL 2011 Unknown

 

          COMPREHENSIVE METABOLIC 26835     Glucose             93 MG/DL  2011 Unknown

 

          COMPREHENSIVE METABOLIC 17314     BICARB              26 MMOL/L 2011 Unknown

 

          COMPREHENSIVE METABOLIC 13419     ANION GAP           5 MEQ/L   2011 Unknown

 

          LIPID GROUP 87425     HDL TEST            46 MG/DL  2011 Unknown

 

          LIPID GROUP 29818     TRIG                102 MG/DL 2011 Unknown

 

          LIPID GROUP 60903     TEST LDL            88 MG/DL  2011 Unknown

 

          LIPID GROUP 57342     CHOL                154 MG/DL 2011 Unknown

 

          LIPID GROUP 58699     RCHOL/HDL           3.35 RATIO 2011 Unknow

n







Procedures





                    Procedure           Codes               Date

 

                    ROUTINE VENIPUNCTURE CPT-4: 43412        2020

 

                    ASSAY THYROID STIM HORMONE CPT-4: 90296        2020

 

                    COMPLETE CBC W/AUTO DIFF WBC CPT-4: 37607        2020

 

                    COMPREHEN METABOLIC PANEL CPT-4: 61586        2020

 

                    ROUTINE VENIPUNCTURE CPT-4: 45459        2019

 

                    LIPID PANEL         CPT-4: 69106        2019

 

                    FLU VACC PRSV FREE INC ANTIG 65 AND OLDER CPT-4: 08909      

  10/22/2019

 

                    FLU VACC PRSV FREE INC ANTIG 65 AND OLDER CPT-4: 93577      

  10/22/2019

 

                    ADMIN INFLUENZA VIRUS VAC CPT-4:         10/22/2019

 

                    COMPREHEN METABOLIC PANEL CPT-4: 97239        10/11/2019

 

                    ROUTINE VENIPUNCTURE CPT-4: 60231        10/11/2019

 

                    ROUTINE VENIPUNCTURE CPT-4: 47829        06/10/2019

 

                    ASSAY THYROID STIM HORMONE CPT-4: 13443        06/10/2019

 

                    COMPREHEN METABOLIC PANEL CPT-4: 05653        06/10/2019

 

                    COMPLETE CBC W/AUTO DIFF WBC CPT-4: 36232        06/10/2019

 

                    URINALYSIS NONAUTO W/O SCOPE CPT-4: 90511        2019

 

                    URINE CULTURE/ COLONY COUNT CPT-4: 53078        2019

 

                    URINE CULTURE/ COLONY COUNT CPT-4: 63281        10/02/2018

 

                    ROUTINE VENIPUNCTURE CPT-4: 69740        2018

 

                    ASSAY OF FREE THYROXINE CPT-4: 85600        2018

 

                    ASSAY THYROID STIM HORMONE CPT-4: 29486        2018

 

                    COMPLETE CBC W/AUTO DIFF WBC CPT-4: 01316        2018

 

                    METABOLIC PANEL TOTAL CA CPT-4: 19230        2018

 

                    FLU VACC PRSV FREE INC ANTIG 65 AND OLDER CPT-4: 27245      

  2018

 

                    ASSAY, GLUCOSE, BLOOD QUANT CPT-4: 06560        2018

 

                    ADMIN INFLUENZA VIRUS VAC CPT-4:         2018

 

                    ROUTINE VENIPUNCTURE CPT-4: 04957        2018

 

                    COMPREHEN METABOLIC PANEL CPT-4: 31748        2018

 

                    COMPLETE CBC W/AUTO DIFF WBC CPT-4: 31692        2018

 

                    A1C HPLC            CPT-4: 91511        2018

 

                    ASSAY OF TROPONIN QUANT CPT-4: 82757        2018

 

                    LIPID PANEL         CPT-4: 93835        2018

 

                    THER/PROPH/DIAG INJ SC/IM CPT-4: 87507        2018

 

                    TRIAMCINOLONE ACET INJ NOS CPT-4:         2018

 

                    URINALYSIS NONAUTO W/O SCOPE CPT-4: 98401        2018

 

                    URINE CULTURE/ COLONY COUNT CPT-4: 51312        2018

 

                    FLU VACC PRSV FREE INC ANTIG 65 AND OLDER CPT-4: 46153      

  10/06/2017

 

                    ADMIN INFLUENZA VIRUS VAC CPT-4:         10/06/2017

 

                    ROUTINE VENIPUNCTURE CPT-4: 11018        2017

 

                    COMPREHEN METABOLIC PANEL CPT-4: 72247        2017

 

                    COMPLETE CBC W/AUTO DIFF WBC CPT-4: 65628        2017

 

                    LIPID PANEL         CPT-4: 97905        2017

 

                    A1C HPLC            CPT-4: 70468        2017

 

                    ASSAY THYROID STIM HORMONE CPT-4: 39471        2017

 

                    ROUTINE VENIPUNCTURE CPT-4: 85106        2017

 

                    ASSAY THYROID STIM HORMONE CPT-4: 63594        2017

 

                    COMPREHEN METABOLIC PANEL CPT-4: 39080        2017

 

                    COMPLETE CBC W/AUTO DIFF WBC CPT-4: 63655        2017

 

                    A1C HPLC            CPT-4: 85988        2017

 

                    FLU VACC PRSV FREE INC ANTIG 65 AND OLDER CPT-4: 99273      

  10/07/2016

 

                    ADMIN INFLUENZA VIRUS VAC CPT-4:         10/07/2016

 

                    ROUTINE VENIPUNCTURE CPT-4: 89122        2016

 

                    ASSAY OF FREE THYROXINE CPT-4: 23876        2016

 

                    ASSAY THYROID STIM HORMONE CPT-4: 95704        2016

 

                    COMPREHEN METABOLIC PANEL CPT-4: 16882        2016

 

                    COMPLETE CBC W/AUTO DIFF WBC CPT-4: 43437        2016

 

                    LIPID PANEL         CPT-4: 63962        2016

 

                    A1C HPLC            CPT-4: 97198        2016

 

                    URINALYSIS NONAUTO W/O SCOPE CPT-4: 11528        2016

 

                    ROUTINE VENIPUNCTURE CPT-4: 73738        2015

 

                    METABOLIC PANEL TOTAL CA CPT-4: 21220        2015

 

                    PRESCRIP TRANSMIT VIA ERX SY CPT-4:         2015

 

                    FLU VACC PRSV FREE INC ANTIG 65 AND OLDER CPT-4: 89242      

  10/16/2015

 

                    ADMIN INFLUENZA VIRUS VAC CPT-4:         10/16/2015

 

                    URINALYSIS NONAUTO W/O SCOPE CPT-4: 65847        09/15/2015

 

                    URINE CULTURE/ COLONY COUNT CPT-4: 41259        09/15/2015

 

                    ROUTINE VENIPUNCTURE CPT-4: 43345        09/10/2015

 

                    ASSAY OF FREE THYROXINE CPT-4: 10280        09/10/2015

 

                    ASSAY THYROID STIM HORMONE CPT-4: 57075        09/10/2015

 

                    COMPREHEN METABOLIC PANEL CPT-4: 20888        09/10/2015

 

                    COMPLETE CBC W/AUTO DIFF WBC CPT-4: 06652        09/10/2015

 

                    LIPID PANEL         CPT-4: 11023        09/10/2015

 

                    A1C HPLC            CPT-4: 48166        09/10/2015

 

                    CERUM REMOVAL       CPT-4: 83865        2015

 

                    PRESCRIP TRANSMIT VIA ERX SY CPT-4:         2015

 

                    FLUZONE, 5ML (Medicare) CPT-4:         10/17/2014

 

                    ADMIN INFLUENZA VIRUS VAC CPT-4:         10/17/2014

 

                    PRESCRIP TRANSMIT VIA ERX SY CPT-4:         2014

 

                    PRESCRIP TRANSMIT VIA ERX SY CPT-4:         2014

 

                    PRESCRIP TRANSMIT VIA ERX SY CPT-4:         2014

 

                    ROUTINE VENIPUNCTURE CPT-4: 36918        2014

 

                    ASSAY OF FREE THYROXINE CPT-4: 90390        2014

 

                    ASSAY THYROID STIM HORMONE CPT-4: 47519        2014

 

                    COMPREHEN METABOLIC PANEL CPT-4: 61401        2014

 

                    COMPLETE CBC W/AUTO DIFF WBC CPT-4: 38531        2014

 

                    LIPID PANEL         CPT-4: 60190        2014

 

                    A1C HPLC            CPT-4: 54065        2014

 

                    VITAMIN B 12 FOLIC ACID CPT-4: 96202|85665  2014

 

                    PRESCRIP TRANSMIT VIA ERX SY CPT-4:         2014

 

                    PRESCRIP TRANSMIT VIA ERX SY CPT-4:         10/15/2013

 

                    FLUZONE, 5ML (Medicare) CPT-4:         2013

 

                    ADMIN INFLUENZA VIRUS VAC CPT-4:         2013

 

                    PRESCRIP TRANSMIT VIA ERX SY CPT-4:         2013

 

                    PRESCRIP TRANSMIT VIA ERX SY CPT-4:         05/10/2013

 

                    ROUTINE VENIPUNCTURE CPT-4: 78811        2012

 

                    ASSAY OF FREE THYROXINE CPT-4: 56603        2012

 

                    ASSAY THYROID STIM HORMONE CPT-4: 15760        2012

 

                    COMPREHEN METABOLIC PANEL CPT-4: 56251        2012

 

                    COMPLETE CBC W/AUTO DIFF WBC CPT-4: 09989        2012

 

                    LIPID PANEL         CPT-4: 29647        2012

 

                    A1C GLYCOSYLATED HEMOGLOBIN TEST CPT-4: 12811        

012

 

                    CERUM REMOVAL       CPT-4: 59171        2012

 

                    PRESCRIP TRANSMIT VIA ERX SY CPT-4:         2012

 

                    PRESCRIP TRANSMIT VIA ERX SY CPT-4:         10/10/2012

 

                    FLUZONE, 5ML (Medicare) CPT-4:         2012

 

                    ADMIN INFLUENZA VIRUS VAC CPT-4:         2012

 

                    ASSAY, GLUCOSE, BLOOD QUANT CPT-4: 68710        2012

 

                    URINALYSIS NONAUTO W/O SCOPE CPT-4: 81921        2012

 

                    URINE CULTURE/ COLONY COUNT CPT-4: 15135        2012

 

                    ROUTINE VENIPUNCTURE CPT-4: 22543        2012

 

                    ASSAY OF FREE THYROXINE CPT-4: 78119        2012

 

                    ASSAY THYROID STIM HORMONE CPT-4: 83017        2012

 

                    COMPREHEN METABOLIC PANEL CPT-4: 99464        2012

 

                    COMPLETE CBC W/AUTO DIFF WBC CPT-4: 46664        2012

 

                    LIPID PANEL         CPT-4: 84864        2012

 

                    ASSAY OF INSULIN    CPT-4: 99723        2012

 

                    A1C GLYCOSYLATED HEMOGLOBIN TEST CPT-4: 36341        

012

 

                    DRAIN/INJECT JOINT/BURSA CPT-4: 50862        2012

 

                    METHYLPREDNISOLONE 40 MG INJ CPT-4:         2012

 

                    TRIAMCINOLONE ACET INJ NOS CPT-4:         2012

 

                    PRESCRIP TRANSMIT VIA ERX SY CPT-4:         2012

 

                    PRESCRIP TRANSMIT VIA ERX SY CPT-4:         2012

 

                    METHYLPREDNISOLONE 40 MG INJ CPT-4:         2012

 

                    DRAIN/INJECT JOINT/BURSA CPT-4: 20561        2012

 

                    TRIAMCINOLONE ACET INJ NOS CPT-4:         2012

 

                    PRESCRIP TRANSMIT VIA ERX SY CPT-4:         2012

 

                    ROUTINE VENIPUNCTURE CPT-4: 14212        2012

 

                    ASSAY OF FREE THYROXINE CPT-4: 31544        2012

 

                    ASSAY THYROID STIM HORMONE CPT-4: 96754        2012

 

                    COMPREHEN METABOLIC PANEL CPT-4: 88300        2012

 

                    COMPLETE CBC W/AUTO DIFF WBC CPT-4: 77717        2012

 

                    LIPID PANEL         CPT-4: 19037        2012

 

                    PRESCRIP TRANSMIT VIA ERX SY CPT-4:         2012

 

                    CERUM REMOVAL       CPT-4: 31701        2011

 

                    PRESCRIP TRANSMIT VIA ERX SY CPT-4:         2011

 

                    FLUZONE, 5ML (Medicare) CPT-4:         10/05/2011

 

                    ADMIN INFLUENZA VIRUS VAC CPT-4:         10/05/2011

 

                    PRESCRIP TRANSMIT VIA ERX SY CPT-4:         2011

 

                    URINALYSIS NONAUTO W/O SCOPE CPT-4: 44818        2011

 

                    URINE CULTURE/ COLONY COUNT CPT-4: 33085        2011

 

                    CUR TOBACCO NON-USER CPT-4:         2011

 

                    ROUTINE VENIPUNCTURE CPT-4: 04634        2011

 

                    COMPLETE CBC W/AUTO DIFF WBC CPT-4: 47219        2011

 

                    COMPREHEN METABOLIC PANEL CPT-4: 58434        2011

 

                    LIPID PANEL         CPT-4: 16342        2011

 

                    ASSAY THYROID STIM HORMONE CPT-4: 64156        2011

 

                    ASSAY OF FREE THYROXINE CPT-4: 63543        2011

 

                    PRESCRIP TRANSMIT VIA ERX SY CPT-4:         2011

 

                    INJ TRIGGER POINT 1/2 MUSCL CPT-4: 12447        2011

 

                    TRIAMCINOLONE ACET INJ NOS CPT-4:         2011

 

                    METHYLPREDNISOLONE 40 MG INJ CPT-4:         2011

 

                    THER/PROPH/DIAG INJ SC/IM CPT-4: 85031        2011

 

                    KETOROLAC TROMETHAMINE INJ CPT-4:         2011

 

                    PRESCRIP TRANSMIT VIA ERX SY CPT-4:         2010

 

                    FLU VACCINE 3 YRS & > IM UP 64 CPT-4: 07995        10/06/201

0

 

                    ADMIN INFLUENZA VIRUS VAC CPT-4:         10/06/2010

 

                    URINALYSIS NONAUTO W/O SCOPE CPT-4: 33423        2010

 

                    URINE CULTURE/ COLONY COUNT CPT-4: 62745        2010

 

                    PRESCRIP TRANSMIT VIA ERX SY CPT-4:         2010

 

                    THER/PROPH/DIAG INJ SC/IM CPT-4: 34225        2010

 

                    VITAMIN B12 INJECTION CPT-4:         2010

 

                    THER/PROPH/DIAG INJ SC/IM CPT-4: 80229        2010

 

                    VITAMIN B12 INJECTION CPT-4:         2010

 

                    ROUTINE VENIPUNCTURE CPT-4: 68526        2010







Vital Signs





                          Date                      Vital

 

                2020      Blood Pressure 1: 138/64 Code: 8480-6 Heart Rate

 1: 52 bpm 

Respiratory Rate: 18 bpm  SpO2: 98%                 Temperature: 36.3 (C) / 97.4

 (F)

 

                    2020          Blood Pressure 1: 144/82 Code: 8480-6 He

art Rate 1: 56 bpm

 

                2020      Blood Pressure 1: 154/86 Code: 8480-6 Heart Rate

 1: 64 bpm 

Respiratory Rate: 20 bpm SpO2: 99%           Temperature: 37.1 (C) / 98.7 (F) We

ight: 215 

lbs 

 

             2019   Blood Pressure 1: 140/70 Code: 8480-6 Heart Rate 1: 57

 bpm SpO2: 99%    

Temperature: 35.9 (C) / 96.6 (F)        Weight: 215 lbs 

 

                06/10/2019      Blood Pressure 1: 144/70 Code: 8480-6 Heart Rate

 1: 60 bpm 

Respiratory Rate: 20 bpm SpO2: 95%           Temperature: 36.4 (C) / 97.6 (F) We

ight: 214 

lbs 

 

                2019      Blood Pressure 1: 128/78 Code: 8480-6 Heart Rate

 1: 56 bpm 

Respiratory Rate: 20 bpm SpO2: 98%           Temperature: 36.3 (C) / 97.4 (F) We

ight: 213 

lbs 

 

                2018      Blood Pressure 1: 142/60 Code: 8480-6 BMI: 38.2 

Code: 00004-6 Heart 

Rate 1: 48 bpm  Height: 5'2"    Respiratory Rate: 20 bpm SpO2: 98%       Tempera

ture: 36.7

(C) / 98.1 (F)                          Weight: 212 lbs 

 

                2018      Blood Pressure 1: 142/64 Code: 8480-6 BMI: 38.5 

Code: 55057-4 Heart 

Rate 1: 52 bpm  Height: 5'2"    Respiratory Rate: 22 bpm SpO2: 96%       Tempera

ture: 36.1

(C) / 96.9 (F)                          Weight: 214 lbs 

 

                10/02/2018      Blood Pressure 1: 124/80 Code: 8480-6 BMI: 38.3 

Code: 05636-0 Heart 

Rate 1: 68 bpm      Height: 5'2"        Respiratory Rate: 20 bpm Temperature: 36

.3 (C) / 

97.4 (F)                                Weight: 213 lbs 

 

                2018      Blood Pressure 1: 132/78 Code: 8480-6 BMI: 37.6 

Code: 62621-5 Heart 

Rate 1: 68 bpm  Height: 5'2"    Respiratory Rate: 20 bpm SpO2: 97%       Tempera

ture: 36.8

(C) / 98.2 (F)                          Weight: 209 lbs 

 

                2018      Blood Pressure 1: 150/76 Code: 8480-6 BMI: 38.5 

Code: 52709-6 Heart 

Rate 1: 64 bpm  Height: 5'2"    Respiratory Rate: 20 bpm SpO2: 97%       Tempera

ture: 36.2

(C) / 97.2 (F)                          Weight: 214 lbs 

 

                2018      Blood Pressure 1: 122/74 Code: 8480-6 BMI: 38.2 

Code: 52015-6 Heart 

Rate 1: 64 bpm  Height: 5'2"    Respiratory Rate: 18 bpm SpO2: 96%       Tempera

ture: 35.8

(C) / 96.4 (F)                          Weight: 212 lbs 

 

                2018      Blood Pressure 1: 124/78 Code: 8480-6 BMI: 37.8 

Code: 86290-2 Heart 

Rate 1: 76 bpm      Height: 5'2"        Respiratory Rate: 20 bpm Temperature: 36

.8 (C) / 

98.3 (F)                                Weight: 210 lbs 

 

                2018      Blood Pressure 1: 136/70 Code: 8480-6 BMI: 38.0 

Code: 84073-4 Heart 

Rate 1: 68 bpm  Height: 5'2"    Respiratory Rate: 20 bpm SpO2: 97%       Tempera

ture: 36.8

(C) / 98.2 (F)                          Weight: 211 lbs 

 

                2018      Blood Pressure 1: 140/65 Code: 8480-6 Heart Rate

 1: 75 bpm 

Respiratory Rate: 24 bpm SpO2: 95%           Temperature: 37.0 (C) / 98.6 (F) We

ight: 211 

lbs 

 

                2018      Blood Pressure 1: 154/70 Code: 8480-6 BMI: 37.6 

Code: 72708-9 Heart 

Rate 1: 76 bpm  Height: 5'2"    Respiratory Rate: 20 bpm SpO2: 98%       Tempera

ture: 36.9

(C) / 98.5 (F)                          Weight: 209 lbs 

 

                2017      Blood Pressure 1: 156/70 Code: 8480-6 BMI: 37.1 

Code: 16611-4 Heart 

Rate 1: 72 bpm  Height: 5'2"    Respiratory Rate: 20 bpm SpO2: 97%       Tempera

ture: 37.0

(C) / 98.6 (F)                          Weight: 206 lbs 

 

                2017      Blood Pressure 1: 152/78 Code: 8480-6 BMI: 36.8 

Code: 29294-1 Heart 

Rate 1: 78 bpm  Height: 5'2"    Respiratory Rate: 20 bpm SpO2: 98%       Tempera

ture: 36.1

(C) / 97.0 (F)                          Weight: 204 lbs 

 

                2017      Blood Pressure 1: 142/70 Code: 8480-6 BMI: 36.9 

Code: 40704-0 Heart 

Rate 1: 64 bpm  Height: 5'2"    Respiratory Rate: 20 bpm SpO2: 96%       Tempera

ture: 36.5

(C) / 97.7 (F)                          Weight: 205 lbs 

 

                2017      Blood Pressure 1: 142/68 Code: 8480-6 Heart Rate

 1: 88 bpm 

Respiratory Rate: 18 bpm SpO2: 98%           Temperature: 36.3 (C) / 97.3 (F) We

ight: 209 

lbs 

 

                2016      Blood Pressure 1: 130/78 Code: 8480-6 Heart Rate

 1: 68 bpm 

Respiratory Rate: 20 bpm SpO2: 95%           Temperature: 36.4 (C) / 97.6 (F) We

ight: 212 

lbs 

 

                2016      Blood Pressure 1: 122/64 Code: 8480-6 BMI: 39.1 

Code: 16619-1 Heart 

Rate 1: 76 bpm      Height: 5'2"        Respiratory Rate: 20 bpm Temperature: 36

.8 (C) / 

98.2 (F)                                Weight: 217 lbs 

 

                2016      Blood Pressure 1: 144/70 Code: 8480-6 BMI: 39.4 

Code: 78473-3 Heart 

Rate 1: 76 bpm      Height: 5'2"        Respiratory Rate: 20 bpm Temperature: 36

.6 (C) / 

97.9 (F)                                Weight: 219 lbs 

 

                2015      Blood Pressure 1: 152/60 Code: 8480-6 BMI: 39.6 

Code: 95766-0 Heart 

Rate 1: 84 bpm      Height: 5'2"        Respiratory Rate: 20 bpm Temperature: 37

.0 (C) / 

98.6 (F)                                Weight: 220 lbs 

 

                2015      Blood Pressure 1: 146/76 Code: 8480-6 BMI: 39.8 

Code: 67018-1 Heart 

Rate 1: 88 bpm      Height: 5'2"        Respiratory Rate: 20 bpm Temperature: 37

.0 (C) / 

98.6 (F)                                Weight: 221 lbs 

 

                2015      Blood Pressure 1: 132/70 Code: 8480-6 BMI: 39.1 

Code: 61791-0 Heart 

Rate 1: 88 bpm      Height: 5'2"        Respiratory Rate: 20 bpm Temperature: 36

.4 (C) / 

97.6 (F)                                Weight: 217 lbs 

 

                2015      Blood Pressure 1: 132/66 Code: 8480-6 BMI: 39.9 

Code: 91895-2 Heart 

Rate 1: 72 bpm      Height: 5'2"        Respiratory Rate: 20 bpm Temperature: 36

.9 (C) / 

98.4 (F)                                Weight: 218 lbs 

 

                2015      Blood Pressure 1: 136/80 Code: 8480-6 Heart Rate

 1: 76 bpm 

Respiratory Rate: 20 bpm  Temperature: 36.7 (C) / 98.0 (F) Weight: 224 lbs 

 

                2015      Blood Pressure 1: 134/78 Code: 8480-6 BMI: 39.7 

Code: 09166-1 Heart 

Rate 1: 84 bpm      Height: 5'2"        Respiratory Rate: 20 bpm Temperature: 36

.7 (C) / 

98.0 (F)                                Weight: 217 lbs 

 

                2014      Blood Pressure 1: 146/78 Code: 8480-6 BMI: 39.5 

Code: 46142-8 Heart 

Rate 1: 82 bpm      Height: 5'2"        Respiratory Rate: 18 bpm Temperature: 35

.6 (C) / 

96.1 (F)                                Weight: 216 lbs 

 

                2014      Blood Pressure 1: 134/70 Code: 8480-6 BMI: 37.9 

Code: 39380-4 Heart 

Rate 1: 80 bpm      Height: 5'3"        Respiratory Rate: 20 bpm Temperature: 36

.8 (C) / 

98.2 (F)                                Weight: 214 lbs 

 

                2014      Blood Pressure 1: 132/70 Code: 8480-6 BMI: 37.6 

Code: 27944-9 Heart 

Rate 1: 80 bpm      Height: 5'3"        Respiratory Rate: 20 bpm Temperature: 36

.8 (C) / 

98.3 (F)                                Weight: 212 lbs 

 

                2014      Blood Pressure 1: 116/74 Code: 8480-6 Heart Rate

 1: 68 bpm 

Respiratory Rate: 20 bpm  Temperature: 36.2 (C) / 97.1 (F) Weight: 212 lbs 

 

                10/15/2013      Blood Pressure 1: 132/82 Code: 8480-6 BMI: 37.4 

Code: 35645-5 Heart 

Rate 1: 76 bpm      Height: 5'3"        Respiratory Rate: 20 bpm Temperature: 36

.7 (C) / 

98.0 (F)                                Weight: 211 lbs 

 

                    2013          Blood Pressure 1: 130/76 Code: 8480-6 He

art Rate 1: 78 bpm

 

                2013      Blood Pressure 1: 140/82 Code: 8480-6 BMI: 36.8 

Code: 88796-4 Heart 

Rate 1: 66 bpm      Height: 5'3"        Respiratory Rate: 20 bpm Temperature: 36

.1 (C) / 

96.9 (F)                                Weight: 208 lbs 

 

                2013      Blood Pressure 1: 138/80 Code: 8480-6 BMI: 36.4 

Code: 82813-7 Heart 

Rate 1: 72 bpm      Height: 5'4"        Respiratory Rate: 20 bpm Temperature: 36

.7 (C) / 

98.0 (F)                                Weight: 212 lbs 

 

                2013      Blood Pressure 1: 124/70 Code: 8480-6 BMI: 36.9 

Code: 34230-0 Heart 

Rate 1: 60 bpm      Height: 5'4"        Temperature: 36.1 (C) / 97.0 (F) Weight:

 215 lbs 

 

                05/10/2013      Blood Pressure 1: 132/86 Code: 8480-6 BMI: 36.9 

Code: 35110-4 Heart 

Rate 1: 76 bpm      Height: 5'4"        Respiratory Rate: 20 bpm Temperature: 36

.8 (C) / 

98.2 (F)                                Weight: 215 lbs 

 

                2013      Blood Pressure 1: 134/82 Code: 8480-6 BMI: 36.6 

Code: 59599-6 Heart 

Rate 1: 72 bpm      Height: 5'4"        Respiratory Rate: 20 bpm Temperature: 36

.3 (C) / 

97.4 (F)                                Weight: 213 lbs 

 

                2012      Blood Pressure 1: 142/80 Code: 8480-6 BMI: 37.1 

Code: 61135-2 Heart 

Rate 1: 76 bpm      Height: 5'4"        Respiratory Rate: 20 bpm Temperature: 36

.8 (C) / 

98.3 (F)                                Weight: 216 lbs 

 

                10/23/2012      Blood Pressure 1: 128/68 Code: 8480-6 BMI: 37.6 

Code: 67684-7 Heart 

Rate 1: 72 bpm      Height: 5'4"        Respiratory Rate: 20 bpm Temperature: 36

.6 (C) / 

97.9 (F)                                Weight: 219 lbs 

 

                10/10/2012      Blood Pressure 1: 122/70 Code: 8480-6 Heart Rate

 1: 76 bpm 

Respiratory Rate: 20 bpm  Temperature: 36.7 (C) / 98.1 (F) Weight: 218 lbs 

 

                    2012          Blood Pressure 1: 132/80 Code: 8480-6 He

art Rate 1: 84 bpm

 

                2012      Blood Pressure 1: 128/78 Code: 8480-6 BMI: 38.1 

Code: 30765-4 Heart 

Rate 1: 84 bpm      Height: 5'4"        Respiratory Rate: 20 bpm Temperature: 36

.9 (C) / 

98.4 (F)                                Weight: 222 lbs 

 

                2012      Blood Pressure 1: 138/80 Code: 8480-6 BMI: 37.9 

Code: 26868-6 Heart 

Rate 1: 74 bpm      Height: 5'4"        Temperature: 36.1 (C) / 97.0 (F) Weight:

 221 lbs 

 

                2012      Blood Pressure 1: 126/78 Code: 8480-6 BMI: 38.4 

Code: 83344-3 Heart 

Rate 1: 72 bpm      Height: 5'4"        Respiratory Rate: 20 bpm Temperature: 36

.7 (C) / 

98.0 (F)                                Weight: 224 lbs 

 

                2012      Blood Pressure 1: 138/72 Code: 8480-6 BMI: 38.4 

Code: 93379-1 Heart 

Rate 1: 72 bpm      Height: 5'4"        Respiratory Rate: 20 bpm Temperature: 36

.6 (C) / 

97.9 (F)                                Weight: 224 lbs 

 

                2012      Blood Pressure 1: 122/78 Code: 8480-6 BMI: 38.8 

Code: 35835-5 Heart 

Rate 1: 88 bpm      Height: 5'4"        Respiratory Rate: 20 bpm Temperature: 36

.6 (C) / 

97.8 (F)                                Weight: 226 lbs 

 

                2012      Blood Pressure 1: 116/60 Code: 8480-6 BMI: 38.1 

Code: 96849-3 Heart 

Rate 1: 92 bpm      Height: 5'4"        Respiratory Rate: 20 bpm Temperature: 36

.8 (C) / 

98.2 (F)                                Weight: 222 lbs 

 

                2011      Blood Pressure 1: 118/62 Code: 8480-6 BMI: 37.9 

Code: 51296-6 Heart 

Rate 1: 80 bpm      Height: 5'4"        Temperature: 36.5 (C) / 97.7 (F) Weight:

 221 lbs 

 

                2011      Blood Pressure 1: 132/70 Code: 8480-6 Heart Rate

 1: 84 bpm 

Respiratory Rate: 20 bpm  Temperature: 36.7 (C) / 98.0 (F) Weight: 221 lbs 

 

                2011      Blood Pressure 1: 114/72 Code: 8480-6 BMI: 37.6 

Code: 61801-4 Heart 

Rate 1: 76 bpm      Height: 5'4"        Respiratory Rate: 20 bpm Temperature: 36

.8 (C) / 

98.3 (F)                                Weight: 219 lbs 

 

                    2011          Blood Pressure 1: 136/76 Code: 8480-6 Te

mperature: 36.0 (C) / 96.8 

(F)                                     Weight: 219 lbs 8 oz

 

                2011      Blood Pressure 1: 128/80 Code: 8480-6 Heart Rate

 1: 72 bpm 

Temperature: 36.3 (C) / 97.4 (F)        Weight: 218 lbs 

 

                2011      Blood Pressure 1: 126/70 Code: 8480-6 Heart Rate

 1: 72 bpm 

Temperature: 36.7 (C) / 98.0 (F)

 

                    2010          Blood Pressure 1: 126/78 Code: 8480-6 Te

mperature: 36.2 (C) / 97.1 

(F)                                     Weight: 217 lbs 8 oz

 

                2010      Blood Pressure 1: 116/70 Code: 8480-6 Heart Rate

 1: 72 bpm 

Temperature: 36.4 (C) / 97.6 (F)        Weight: 222 lbs 

 

                2010      Blood Pressure 1: 114/78 Code: 8480-6 Heart Rate

 1: 72 bpm 

Temperature: 37.1 (C) / 98.8 (F)        Weight: 225 lbs 

 

                2010      Blood Pressure 1: 128/78 Code: 8480-6 Heart Rate

 1: 76 bpm 

Temperature: 36.6 (C) / 97.8 (F)        Weight: 224 lbs 

 

                2010      Blood Pressure 1: 116/78 Code: 8480-6 Heart Rate

 1: 80 bpm 

Temperature: 36.4 (C) / 97.6 (F)        Weight: 226 lbs 

 

                2010      Blood Pressure 1: 120/70 Code: 8480-6 BMI: 38.3 

Code: 74599-1 Heart 

Rate 1: 88 bpm      Height: 5'5"        Temperature: 36.4 (C) / 97.6 (F) Weight:

 230 lbs 







Functional Status

No Functional Status data



Reason For Visit





                    Reason For Visit    Effective Dates     Notes

 

                    follow up           2020           

 

                    blood pressure check 2020           

 

                    high blood pressure 2020           

 

                    lab draw            2019           

 

                    lab draw            10/11/2019           

 

                    hearing loss        2019          left ear

 

                    follow up           06/10/2019           

 

                    follow up           2019          Patient has upcoming

 appointment with Dr Claire and carotid 

dopplers tomorrow

 

                    follow up           2018          Patient had heart ca

th on 10-30-18 and one of the bypass 

veins in spasming

 

                    follow up           2018          4 Week

 

                    follow up           10/02/2018           

 

                    follow up           2018           

 

                    high blood pressure 2018          Dr Claire has ordered

 holter monitor and 

hydralazine 50mg PRN

 

                    dizziness           2018          On  morning darren delvalle woke up and went to the bathroom 

and after lying down became very dizzy. Patient has hx of vertigo so didn't 
think anything of it. She usually just has them intermittently, but had more 
that day. While at [employer] began to feel very ill and became very shaky. She got 
home and sat in the recliner and had the jittery/nervous feeling. Later that 
night it finally resolved. Patient feels like she had a spell like this around 
the  and thought it was due to extreme heat exhaustion.

 

                    follow up           2018           

 

                    back pain           2018           

 

                    otalgia             2018           

 

                    back pain           2018           

 

                    injection(s)        10/06/2017          flu shot

 

                    lab draw            2017           

 

                    follow up           2017           

 

                    pelvic pain         2017          Patient states pain 

started in May with a "Constant Ache"

to left inguinal area. Patient states at that time pain was intermittent. Then, 
approximately 2nd week  of  pain worsened and radiates to left low back 
area.

 

                    lab draw            2017           

 

                    hyperglycemia       2017           

 

                    cough               2017           

 

                    otalgia             2016           

 

                    injection(s)        10/07/2016          Influenza

 

                    lab draw            2016           

 

                    constipation        2016           

 

                    flank pain          2016           

 

                    follow up           2015          3mo fwup

 

                    injection(s)        10/16/2015          Influenza

 

                    acute renal failure 09/15/2015           

 

                    lab draw            09/10/2015           

 

                    fatigue             2015           

 

                    otalgia             2015           

 

                    pain, limb          2015           

 

                    follow up           2015          [facility]

 

                    high blood pressure 2015           

 

                    injection(s)        10/17/2014          flu shot

 

                    sinusitis           2014           

 

                    high blood pressure 2014           

 

                    cough               2014          Was panfilo at Dr Tyree teixeira's office so he started he on amlodipine 

10mg but seems to be dragging her down. Patient decreased to 1/2 tablet this 
last week

 

                    lab draw            2014           

 

                    cough               2014           

 

                    cough               10/15/2013           

 

                    high blood pressure 2013           

 

                    follow up           2013          [facility]

 

                    dizziness           2013           

 

                    follow up           2013           

 

                    otalgia             05/10/2013           

 

                    breast complaint    2013           

 

                    lab draw            2012           

 

                    follow up           2012          2mo fwup

 

                    follow up           10/23/2012          2wk fwup

 

                    gas and bloating    10/10/2012          Dr Claire has her mon

itoring because was high in his 

office at last visit

 

                    injection(s)        2012           

 

                    dizziness           2012           

 

                    dizziness           2012           

 

                    lab draw            2012           

 

                    muscle weakness     2012          concerns of simvasta

tin with fatigue and muscle 

weakness

 

                    leg pain/sciatica   2012           

 

                    hip pain            2012           

 

                    back pain           2012          has tried ice pack, 

muscle relaxers, and moist heat

 

                    back pain           2012           

 

                    fatigue             2012          in hands/feet

 

                    otalgia             2011           

 

                    follow up           2011          2wk fwup

 

                    back pain           2011          thinks ulcer may hav

e returned

 

                    lab draw            2011           

 

                    dizziness           2011          Left ear and facial 

pain, right ear pain 

 

                    follow up           2011          1wk fwup

 

                    hip pain            2011          feels very draggy, f

atanish, BP 80/63, normally running 

100's/60's

 

                    chills              2010           

 

                    injection(s)        10/06/2010          flu shot

 

                    follow up           2010          6wk fwup, had HH rep

aired and is starting to feel better, 

started on reglan 10mg tid

 

                    follow up           2010          hosp fwup

 

                    follow up           2010          1mo fwup, saw Dr Danna du and hasn't gave cardiac clearance 

yet for HH repair, did have chemical stress test yesterday and waiting on 
results

 

                    follow up           2010          from EGD/colonoscopy

--has Hiatal Hernia and wants to do 

repair

 

                    follow up           2010           







Encounters





             Encounter    Performer    Location     Codes        Date

 

                                        () OFFICE/OUTPATIENT VISIT EST

Diagnosis: Essential (primary) hypertension[ICD10: I10]

Diagnosis: Palpitations[ICD10: R00.2] Akanksha EWING                    CPT-4: 86412              2020

 

                                        ([id number]) OFFICE/OUTPATIENT VISIT EST

Diagnosis: Essential hypertension[ICD10: I10]

Diagnosis: Palpitations[ICD10: R00.2]

Diagnosis: Stress reaction[ICD10: F43.0] [facility]            CPT-4: 99294              2020

 

                                        ([id number]) NURSE/OUTPATIENT VISIT EST

Diagnosis: Mixed hyperlipidemia[ICD10: E78.2] Akanksha DRIVER DO [facility]            CPT-4: 03108              2019

 

                                        ([id number]) NURSE/OUTPATIENT VISIT EST

Diagnosis: FLU VACCINE[ICD10: Z23] Akanksha KEY DO 

[facility]                       CPT-4: 91626              10/22/2019

 

                                        ([id number]) NURSE/OUTPATIENT VISIT EST

Diagnosis: Chronic kidney disease, stage 1[ICD10: N18.1] Akanksha DRIVER DO [facility] CPT-4: 13208              10/11/2019

 

                                        ([id number]) OFFICE/OUTPATIENT VISIT EST

Diagnosis: Acute serous otitis media, left ear[ICD10: H65.02] Nat DRIVER DO [facility] CPT-4: 33556              2019

 

                                        ([id number]) OFFICE/OUTPATIENT VISIT EST

Diagnosis: Essential (primary) hypertension[ICD10: I10]

Diagnosis: Coronary atherosclerosis due to calcified coronary lesion[ICD10: 
I25.84]

Diagnosis: Hypoglycemia, unspecified[ICD10: E16.2]

Diagnosis: Other fatigue[ICD10: R53.83]

Diagnosis: Urinary tract infection, site not specified[ICD10: N39.0] Akanksha DRIVER DO [facility] CPT-4: 31582        06/10/2019

 

                                        ([id number]) OFFICE/OUTPATIENT VISIT EST

Diagnosis: Essential (primary) hypertension[ICD10: I10]

Diagnosis: Gastro-esophageal reflux disease without esophagitis[ICD10: K21.9]

Diagnosis: Urinary tract infection, site not specified[ICD10: N39.0] Akanksha DRIVER DO [facility] CPT-4: 71698        2019

 

                                        ([id number]) OFFICE/OUTPATIENT VISIT EST

Diagnosis: Gastro-esophageal reflux disease without esophagitis[ICD10: K21.9]

Diagnosis: Epigastric pain[ICD10: R10.13] [facility]            CPT-4: 83771              2018

 

                                        ([id number]) OFFICE/OUTPATIENT VISIT EST

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

Diagnosis: Essential (primary) hypertension[ICD10: I10]

Diagnosis: Generalized anxiety disorder[ICD10: F41.1]

Diagnosis: Gastro-esophageal reflux disease without esophagitis[ICD10: K21.9] 

Akanksha DRIVER [facility] CPT-4: 48600        2018

 

                                        OFFICE/OUTPATIENT VISIT EST

Diagnosis: Gastro-esophageal reflux disease without esophagitis[ICD10: K21.9]

Diagnosis: Palpitations[ICD10: R00.2]

Diagnosis: Urinary tract infection, site not specified[ICD10: N39.0]

Diagnosis: Generalized anxiety disorder[ICD10: F41.1] Akanksha DRIVER [facility] CPT-4: 98308              10/02/2018

 

                                        ([id number]) NURSE/OUTPATIENT VISIT EST

Diagnosis: Coronary atherosclerosis due to calcified coronary lesion[ICD10: 
I25.84]

Diagnosis: Hypoglycemia, unspecified[ICD10: E16.2]

Diagnosis: Dizziness and giddiness[ICD10: R42]

Diagnosis: Occlusion and stenosis of bilateral carotid arteries[ICD10: I65.23] 

Akanksha DRIVER [facility] CPT-4: 35305        2018

 

                                        OFFICE/OUTPATIENT VISIT EST

Diagnosis: Epigastric pain[ICD10: R10.13]

Diagnosis: Generalized anxiety disorder[ICD10: F41.1]

Diagnosis: FLU VACCINE[ICD10: Z23] Akanksha SPENCER

[facility]                       CPT-4: 19885              2018

 

                                        (40324) OFFICE/OUTPATIENT VISIT EST

Diagnosis: Essential (primary) hypertension[ICD10: I10]

Diagnosis: Hypoglycemia, unspecified[ICD10: E16.2]

Diagnosis: Gastro-esophageal reflux disease without esophagitis[ICD10: K21.9] 

Akanksha DRIVER [facility] CPT-4: 16507        2018

 

                                        ([id number]) OFFICE/OUTPATIENT VISIT EST

Diagnosis: Dizziness and giddiness[ICD10: R42] Nat DRIVER mcTEL [facility]            CPT-4: 77434              2018

 

                                        (65421) OFFICE/OUTPATIENT VISIT EST

Diagnosis: Cervicalgia[ICD10: M54.2]

Diagnosis: Other spondylosis with radiculopathy, cervical region[ICD10: M47.22] 

Akanksha DRIVER [facility] CPT-4: 50613        2018

 

                                        (97538) OFFICE/OUTPATIENT VISIT EST

Diagnosis: Hypoglycemia, unspecified[ICD10: E16.2]

Diagnosis: Other spondylosis with radiculopathy, cervical region[ICD10: M47.22]

Diagnosis: Vertigo of central origin, bilateral[ICD10: H81.43]

Diagnosis: Cervicocranial syndrome[ICD10: M53.0] Akanksha Villa DRIVER mcTEL [facility]         CPT-4: 24849              2018

 

                                        (35959) OFFICE/OUTPATIENT VISIT EST

Diagnosis: Benign paroxysmal vertigo, bilateral[ICD10: H81.13]

Diagnosis: Otalgia, bilateral[ICD10: H92.03] Nta DRIVER mcTEL [facility]            CPT-4: 92920              2018

 

                                        ([id number]) OFFICE/OUTPATIENT VISIT EST

Diagnosis: Low back pain[ICD10: M54.5]

Diagnosis: Radiculopathy, lumbosacral region[ICD10: M54.17]

Diagnosis: Left lower quadrant pain[ICD10: R10.32] Akanksha DE LA ROSA

OLIVIA SPENCER mcTEL [facility]         CPT-4: 33277              2018

 

                                        (73774) OFFICE/OUTPATIENT VISIT EST

Diagnosis: FLU VACCINE[ICD10: Z23] Akanksha Xiangrodo KEVINQUELINE OLIVIA KEY mcTEL 

[facility]                       CPT-4: 84462              10/06/2017

 

                                        (33555) OFFICE/OUTPATIENT VISIT EST

Diagnosis: Mixed hyperlipidemia[ICD10: E78.2]

Diagnosis: Essential (primary) hypertension[ICD10: I10]

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

Diagnosis: Impaired fasting glucose[ICD10: R73.01]

Diagnosis: Other fatigue[ICD10: R53.83] Akanksha DRIVER DO [facility]                    CPT-4: 07220              2017

 

                                        (11527) OFFICE/OUTPATIENT VISIT EST

Diagnosis: Pain in thoracic spine[ICD10: M54.6]

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 

Akanksha DRIVER DO [facility] CPT-4: 17688        2017

 

                                        (76624) OFFICE/OUTPATIENT VISIT EST

Diagnosis: Left lower quadrant pain[ICD10: R10.32]

Diagnosis: Low back pain[ICD10: M54.5] Akanksha HIGHTOWERLINE OLIVIA SYKES DO [facility]                    CPT-4: 26668              2017

 

                                        (06309) OFFICE/OUTPATIENT VISIT EST

Diagnosis: Mixed hyperlipidemia[ICD10: E78.2]

Diagnosis: Essential (primary) hypertension[ICD10: I10]

Diagnosis: Hypoglycemia, unspecified[ICD10: E16.2] Akanksha DRIVER [facility]         CPT-4: 77411              2017

 

                                        (38175) OFFICE/OUTPATIENT VISIT EST

Diagnosis: Impaired fasting glucose[ICD10: R73.01]

Diagnosis: Mastodynia[ICD10: N64.4]

Diagnosis: Mixed hyperlipidemia[ICD10: E78.2]

Diagnosis: Essential (primary) hypertension[ICD10: I10]

Diagnosis: Other fatigue[ICD10: R53.83] Akanksha Xiangndangela HIGHTOWERLINE SAramis 

[facility]ILL[facility]

[facility]                    CPT-4: 95203              2017

 

                                        (89521) OFFICE/OUTPATIENT VISIT EST

Diagnosis: Acute upper respiratory infection, unspecified[ICD10: J06.9] Luba HIGHTOWERLINE OLIVIA DRIVER DO [facility] CPT-4: 67706        2017

 

                                        (12648) OFFICE/OUTPATIENT VISIT EST

Diagnosis: Acute mastoiditis without complications, right ear[ICD10: H70.001] 

Akankshatammy KEVINQUELINE SAramis VILLA CASE [facility] CPT-4: 65608        2016

 

                                        (74664) OFFICE/OUTPATIENT VISIT EST

Diagnosis: FLU VACCINE[ICD10: Z23] Akanksha BAUMAN SAramis TJ KEY [facility]                       CPT-4: 35211              10/07/2016

 

                                        (33550) OFFICE/OUTPATIENT VISIT EST

Diagnosis: Mixed hyperlipidemia[ICD10: E78.2]

Diagnosis: Essential (primary) hypertension[ICD10: I10]

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

Diagnosis: Impaired fasting glucose[ICD10: R73.01] Akanksha DRIVER [facility]         CPT-4: 16648              2016

 

                                        (19164) OFFICE/OUTPATIENT VISIT EST

Diagnosis: Constipation, unspecified[ICD10: K59.00] Akanksha DRIVER [facility] CPT-4: 67414              2016

 

                                        (26804) OFFICE/OUTPATIENT VISIT EST

Diagnosis: Essential (primary) hypertension[ICD10: I10]

Diagnosis: Mixed hyperlipidemia[ICD10: E78.2]

Diagnosis: Chronic kidney disease, stage 1[ICD10: N18.1] Akanksha DRIVER [facility] CPT-4: 30679              2016

 

                                        (17822) OFFICE/OUTPATIENT VISIT EST

Diagnosis: Muscle weakness (generalized)[ICD10: M62.81]

Diagnosis: Dizziness and giddiness[ICD10: R42]

Diagnosis: Essential (primary) hypertension[ICD10: I10]

Diagnosis: History of falling[ICD10: Z91.81] Akanksha Otoolendangela DRIVER [facility]            CPT-4: 57262              2015

 

                                        (54140) OFFICE/OUTPATIENT VISIT EST

Diagnosis: FLU VACCINE[ICD10: Z23] Akanksha KEY [facility]                       CPT-4: 99260              10/16/2015

 

                                        (05877) OFFICE/OUTPATIENT VISIT EST

Diagnosis: Acute renal failure[ICD9: 584.9]

Diagnosis: POLYURIA[ICD9: 788.42] Akanksha LANCE [facility]                       CPT-4: 31713              09/15/2015

 

                                        (82004) OFFICE/OUTPATIENT VISIT EST

Diagnosis: HYPERLIPIDEMIA NEC/NOS[ICD9: 272.4]

Diagnosis: HYPERTENSION[ICD9: 401.9]

Diagnosis: CAD[ICD9: 414.00]

Diagnosis: Hyperglycemia[ICD9: 790.29]

Diagnosis: MALAISE AND FATIGUE[ICD9: 780.79] Akanksha DRIVER DO [facility]            CPT-4: 26276              09/10/2015

 

                                        (48619) OFFICE/OUTPATIENT VISIT EST

Diagnosis: MALAISE AND FATIGUE[ICD9: 780.79]

Diagnosis: HYPOGLYCEMIA[ICD9: 251.2]

Diagnosis: Grieving[ICD9: 309.0]

Diagnosis: ABDOMINAL PAIN[ICD9: 789.00] Akanksha DRIVER DO [facility]                    CPT-4: 13126              2015

 

                                        OFFICE/OUTPATIENT VISIT EST

Diagnosis: CERUMEN IMPACTION[ICD9: 380.4]

Diagnosis: EUSTACHIAN TUBE DYSFUNCTION[ICD9: 381.81] Bertha FuentesLilayuri DRIVER DO [facility] CPT-4: 48375              2015

 

                                        (12715) OFFICE/OUTPATIENT VISIT EST

Diagnosis: Leg pain[ICD9: 729.5]

Diagnosis: SUPERFIC PHLEBITIS-LEG[ICD9: 451.0] Akanksha DRIVER mcTEL [facility]            CPT-4: 39292              2015

 

                                        (28926) OFFICE/OUTPATIENT VISIT EST

Diagnosis: Thoracic back pain[ICD9: 724.1]

Diagnosis: SPASM OF MUSCLE[ICD9: 728.85] Akanksha DRIVER DO [facility]            CPT-4: 92078              2015

 

                                        (51888) OFFICE/OUTPATIENT VISIT EST

Diagnosis: DIZZINESS/VERTIGO[ICD9: 780.4]

Diagnosis: Benign positional vertigo[ICD9: 386.11]

Diagnosis: HYPERTENSION[ICD9: 401.9]

Diagnosis: Suspicious nevus[ICD9: 238.2] Akanksha DRIVER DO [facility]            CPT-4: 58208              2015

 

                                        ([id number]) OFFICE/OUTPATIENT VISIT EST

Diagnosis: FLU VACCINE[ICD10: Z23] Akanksha KEY DO 

[facility]                       CPT-4: 52678              10/17/2014

 

                                        OFFICE/OUTPATIENT VISIT EST

Diagnosis: SINUSITIS, ACUTE[ICD9: 461.9]

Diagnosis: EUSTACHIAN TUBE DYSFUNCTION[ICD9: 381.81] Bertha DRIVER [facility] CPT-4: 88224              2014

 

                                        (48914) OFFICE/OUTPATIENT VISIT EST

Diagnosis: DIZZINESS/VERTIGO[ICD9: 780.4]

Diagnosis: HYPERTENSION[ICD9: 401.9] Akanksha OTOOLE

[facility]                       CPT-4: 66914              2014

 

                                        (63062) OFFICE/OUTPATIENT VISIT EST

Diagnosis: HYPERTENSION[ICD9: 401.9]

Diagnosis: MALAISE AND FATIGUE[ICD9: 780.79]

Diagnosis: SINUSITIS, ACUTE[ICD9: 461.9] [facility]            CPT-[facility]: 84587              2014

 

                                        (15206) OFFICE/OUTPATIENT VISIT EST

Diagnosis: HYPERLIPIDEMIA NEC/NOS[ICD9: 272.4]

Diagnosis: HYPERTENSION[ICD9: 401.9]

Diagnosis: B12 DEFIC ANEMIA NEC[ICD9: 281.1]

Diagnosis: HYPOGLYCEMIA[ICD9: 251.2] Akanksha OTOOLE

[facility]                       CPT-4: 27566              2014

 

                                        OFFICE/OUTPATIENT VISIT EST

Diagnosis: COUGH[ICD9: 786.2] Bertha DRIVER [facility] 

CPT-4: 12103                            2014

 

                                        OFFICE/OUTPATIENT VISIT EST

Diagnosis: COUGH[ICD9: 786.2]

Diagnosis: URI, ACUTE[ICD9: 465.9] Bertha SPENCER

[facility]                       CPT-4: 28557              10/15/2013

 

                                        (97617) OFFICE/OUTPATIENT VISIT EST

Diagnosis: HYPERTENSION[ICD9: 401.9]

Diagnosis: CEPHALGIA[ICD9: 784.0]

Diagnosis: ANXIETY STATE NOS[ICD9: 300.00]

Diagnosis: FLU VACCINE[ICD9: V04.81] [facility]                       [facility]: 11802              2013

 

                                        (88820) OFFICE/OUTPATIENT VISIT EST

Diagnosis: DIZZINESS/VERTIGO[ICD9: 780.4]

Diagnosis: SINUSITIS, ACUTE[ICD9: 461.9]

Diagnosis: Benign positional vertigo[ICD9: 386.11] Akanksha Xiangrodo KEVIN

FELECIATAMMY

SAramis VILLA [facility]         CPT-4: 34944              2013

 

                                        OFFICE/OUTPATIENT VISIT EST

Diagnosis: OTITIS MEDIA NOS[ICD9: 382.9] Akanksha Otoolendangela HIGHTOWERLINE SAramis

 

[facility]            CPT-4: 22355              2013

 

                                        OFFICE/OUTPATIENT VISIT EST

Diagnosis: Perforation of ear drum[ICD9: 384.20]

Diagnosis: SINUSITIS, ACUTE[ICD9: 461.9] Akanksha Otoolerodo        AKANKSHA SAramis

 

[facility]            CPT-4: 56497              05/10/2013

 

                                        (26929) OFFICE/OUTPATIENT VISIT EST

Diagnosis: Mastalgia[ICD9: 611.71] Akanksha Xiangrodo        AKANKSHA SAramis TJ KEY [facility]                       CPT-4: 04997              2013

 

                                        (76470) OFFICE/OUTPATIENT VISIT EST

Diagnosis: HYPERLIPIDEMIA NEC/NOS[ICD9: 272.4]

Diagnosis: HYPERTENSION[ICD9: 401.9]

Diagnosis: DIZZINESS/VERTIGO[ICD9: 780.4]

Diagnosis: Hyperglycemia[ICD9: 790.29]

Diagnosis: MALAISE AND FATIGUE[ICD9: 780.79] Akankshazina TEIXEIRA SAramis 

[facility]            CPT-4: 74928              2012

 

                                        (26458) OFFICE/OUTPATIENT VISIT EST

Diagnosis: EUSTACHIAN TUBE DYSFUNCTION[ICD9: 381.81]

Diagnosis: DIZZINESS/VERTIGO[ICD9: 780.4]

Diagnosis: ABDOMINAL PAIN[ICD9: 789.00]

Diagnosis: GERD[ICD9: 530.81]

Diagnosis: CERUMEN IMPACTION[ICD9: 380.4] Akanksha Xiangrodo BAUMAN S

Aramis 

[facility]            CPT-4: 90417              2012

 

                                        OFFICE/OUTPATIENT VISIT EST

Diagnosis: ABDOMINAL PAIN[ICD9: 789.00]

Diagnosis: DYSPEPSIA[ICD9: 536.8] Akanksha BAKER XIANGLORNA LANCE [facility]                       CPT-4: 24859              10/23/2012

 

                                        (52368) OFFICE/OUTPATIENT VISIT EST

Diagnosis: ABDOMINAL PAIN[ICD9: 789.00]

Diagnosis: DYSPEPSIA[ICD9: 536.8]

Diagnosis: IBS[ICD9: 564.1] Akanksha DRIVER [facility] CPT

-

4: 56593                                10/10/2012

 

                                        OFFICE/OUTPATIENT VISIT EST

Diagnosis: DIZZINESS/VERTIGO[ICD9: 780.4]

Diagnosis: HYPOGLYCEMIA[ICD9: 251.2] Akanksha MEJIA [facility]                       CPT-4: 16654              2012

 

                                        (16185) OFFICE/OUTPATIENT VISIT EST

Diagnosis: URINARY TRACT INFECTION[ICD9: 599.0] Akanksha DRIVER [facility]            CPT-4: 14429              2012

 

                                        (02652) OFFICE/OUTPATIENT VISIT EST

Diagnosis: HYPERLIPIDEMIA NEC/NOS[ICD9: 272.4]

Diagnosis: HYPERTENSION[ICD9: 401.9]

Diagnosis: MALAISE AND FATIGUE[ICD9: 780.79]

Diagnosis: DIZZINESS/VERTIGO[ICD9: 780.4] [facility]ha [facility]            CPT-4: 81001              2012

 

                                        (98543) OFFICE/OUTPATIENT VISIT EST

Diagnosis: DIZZINESS/VERTIGO[ICD9: 780.4]

Diagnosis: MALAISE AND FATIGUE[ICD9: 780.79]

Diagnosis: HYPERTENSION[ICD9: 401.9] Akanksha MEJIA [facility]                       CPT-4: 20933              2012

 

                                        OFFICE/OUTPATIENT VISIT EST

Diagnosis: LUMB/LUMBOSAC DISC DEGEN[ICD9: 722.52]

Diagnosis: Radiculopathy of leg[ICD9: 724.4]

Diagnosis: SACROILIITIS NEC[ICD9: 720.2]

Diagnosis: SPINAL ENTHESOPATHY[ICD9: 720.1] Akanksha DRIVER [facility]            CPT-4: 49431              2012

 

                                        (47866) OFFICE/OUTPATIENT VISIT EST

Diagnosis: PAIN, LOWER BACK[ICD9: 724.2]

Diagnosis: SPASM OF MUSCLE[ICD9: 728.85]

Diagnosis: SCIATICA[ICD9: 724.3]

Diagnosis: Lumbar degenerative disc disease[ICD9: 722.52] Akanksha DRIVER DO [facility] CPT-4: 28536              2012

 

                                        (64216) OFFICE/OUTPATIENT VISIT EST

Diagnosis: PAIN IN THORACIC SPINE[ICD9: 724.1]

Diagnosis: SPASM OF MUSCLE[ICD9: 728.85] Akanksha DRIVER DO [facility]            CPT-4: 60544              2012

 

                                        (00903) OFFICE/OUTPATIENT VISIT EST

Diagnosis: PAIN, LOWER BACK[ICD9: 724.2]

Diagnosis: SPASM OF MUSCLE[ICD9: 728.85]

Diagnosis: Sacroiliac dysfunction[ICD9: 739.4]

Diagnosis: Lumbar degenerative disc disease[ICD9: 722.52] Akanksha DRIVER DO [facility] CPT-4: 70702              2012

 

                                        OFFICE/OUTPATIENT VISIT EST

Diagnosis: MALAISE AND FATIGUE[ICD9: 780.79]

Diagnosis: HYPOTENSION[ICD9: 458.9]

Diagnosis: CAD[ICD9: 414.00] Akanksha DRIVER DO [facility] 

CPT-4: 75685                            2012

 

                                        OFFICE/OUTPATIENT VISIT EST

Diagnosis: SINUSITIS, ACUTE[ICD9: 461.9]

Diagnosis: PHARYNGITIS, ACUTE[ICD9: 462]

Diagnosis: CERUMEN IMPACTION[ICD9: 380.4] [facility]ha [facility]            CPT-4: 74803              2011

 

                                        OFFICE/OUTPATIENT VISIT EST

Diagnosis: PAIN IN THORACIC SPINE[ICD9: 724.1]

Diagnosis: GERD[ICD9: 530.81]

Diagnosis: DIZZINESS/VERTIGO[ICD9: 780.4] [facility]ha [facility]            CPT-4: 32853              2011

 

                OFFICE/OUTPATIENT VISIT EST Akanksha MEJIA DO [facility] CPT-

4: 01649                                2011

 

                    (76711) OFFICE/OUTPATIENT VISIT EST Akanksha DRIVER DO 

[facility]                       CPT-4: 54329              2011

 

                                        (15241) OFFICE/OUTPATIENT VISIT, EST

Diagnosis: PAIN, LOWER BACK[ICD9: 724.2] Akanksha BAUMAN SAramis

 

[facility]            CPT-4: 23768              2011

 

                    (23586) OFFICE/OUTPATIENT VISIT, EST Akanksha DE LA ROSA S. [facility]

[facility]                       CPT-4: 57764              2011

 

                    (40041) OFFICE/OUTPATIENT VISIT, EST Akanksha DE LA ROSA S. [facility]

[facility]                       CPT-4: 12641              2010

 

                    (84567) OFFICE/OUTPATIENT VISIT, EST Akanksha IZQUIERDOLINE S. [facility] DO

[facility]                       CPT-4: 49821              2010

 

                    (32635) OFFICE/OUTPATIENT VISIT, EST Akanksha DE LA ROSA S. [facility]

[facility]                       CPT-4: 63068              2010

 

                    (61958) OFFICE/OUTPATIENT VISIT, EST Akanksha DE LA ROSA S. [facility]END[facility] DO

[facility]                       CPT-4: 89553              2010

 

                    (13614) OFFICE/OUTPATIENT VISIT, EST Akanksha DE LA ROSA S. [facility]END[facility] DO

[facility]                       CPT-4: 13211              2010

 

                    (72217) OFFICE/OUTPATIENT VISIT, EST Akanksha DE LA ROSA S. ORENDER DO

[facility]                       CPT-4: 13291              2010







Plan of Care





             Planned Activity Notes        Codes        Status       Date

 

                          Visit Diagnosis Plan: Essential (primary) hypertension

 Discussion: Improving 

with higher dose of metoprolol Recheck 2weeks

                                        ICD-9 : 401.9

ICD-10 : I10

                                                    2020

 

                          Visit Diagnosis Plan: Palpitations Discussion: Continu

e higher dose of 

metoprolol

                                        ICD-9 : 785.1

ICD-10 : R00.2

                                                    2020

 

                                        Appointment: Akanksha Driver

WPtel:[phone] [address] [facility] [place]

              2020--moved up to 20 at 4pm (km)                 [place]

NCELED        2020

 

                          Visit Diagnosis Plan: Essential hypertension Discussio

n: Increase metoprolol to 

25mg q AM and 50mg po q pM Recheck 1 week

                                        ICD-9 : 401.9

ICD-10 : I10

                                                    2020

 

                          Visit Diagnosis Plan: Palpitations Discussion: Check C

BC, CMP, TSH

                                        ICD-9 : 785.1

ICD-10 : R00.2

                                                    2020

 

                                        Appointment: Akanksha Driver

WPtel:[phone]

                                        [address] [place] [address]                                              BP CHECK        2020

 

                                        Appointment: Akanksha Driver

WPtel:[phone]

                                        [address] [place] [address]                                              ACUTE ILLNESS   2020

 

                          Patient Education: metoprolol tartrate- [employer] [facility] [id number] 

[email or web address]/[email or web address]

                                        Completed           2020

 

                    Care Plan: X-RAY EXAM NECK SPINE 4/5VWS                     

LOINC : 48936-5

                          Pending                   2020

 

                    Care Plan: X-RAY EXAM THORAC SPINE4/>VW                     

LOINC : 74271-8

                          Pending                   2020

 

                                        Appointment: Akanksha Driver

WPtel:[phone]

                                        [address] [place] [address]                                              LAB             2019

 

                                        Appointment: Akanksha Driver

WPtel:[phone]

                                        [address] [place], [place] [id number]

[place]                                              INJECTION       10/22/2019

 

             Patient Education: INFLUENZA VACCINE [facility]                           

Completed    10/22/2019

 

                                        Appointment: Akanksha Driver

WPtel:[phone]

                                        [address] [place], [place] [id number]

[place]                                              LAB             10/11/2019

 

                          Visit Diagnosis Plan: Acute serous otitis media, left 

ear Discussion: instructed

to use flonase and claritin daily for symptom relief. discussed with patient 
that if no improvement in 2 weeks, will need to follow up with ENT for audiology
exam. instructed to call office with any other symptoms such as otalgia, 
dysphagia, fever, etc.

                                        ICD-9 : 381.01

ICD-10 : H65.02

                                                    2019

 

                                        Appointment: Nat Lozoya

                                        [address] [place], [place] [id number]

                                              ACUTE ILLNESS   2019

 

                          Visit Diagnosis Plan: Hypoglycemia, unspecified Discus

madeleine: Monitor BS At least 

6 small meals a day each with protein

                                        ICD-9 : 251.2

ICD-10 : E16.2

                                                    06/10/2019

 

                          Visit Diagnosis Plan: Essential (primary) hypertension

 Discussion: Stable Check 

CMP

                                        ICD-9 : 401.9

ICD-10 : I10

                                                    06/10/2019

 

                          Visit Diagnosis Plan: Other fatigue Discussion: Check 

CBC, TSH

                                        ICD-9 : 780.79

ICD-10 : R53.83

                                                    06/10/2019

 

                          Visit Diagnosis Plan: Urinary tract infection, site no

t specified Discussion: 

Started an old abx prescription

                                        ICD-9 : 599.0

ICD-10 : N39.0

                                                    06/10/2019

 

                                        Appointment: Akanksha Driver

WPtel:[phone]

                                        [address] [place], [place] [id number]

[place]                                              FOLLOW UP       06/10/2019

 

                          Visit Diagnosis Plan: Urinary tract infection, site no

t specified Discussion: 

Macrobid 100mg po BID for 1 week

                                        ICD-9 : 599.0

ICD-10 : N39.0

                                                    2019

 

                          Visit Diagnosis Plan: Gastro-esophageal reflux disease

 without esophagitis 

Discussion: Stable on omeprazole

Follow Up: 3 months

                                        ICD-9 : 530.81

ICD-10 : K21.9

                                                    2019

 

                          Visit Diagnosis Plan: Essential (primary) hypertension

 Discussion: Stable Sees 

Dr. Clements this month

                                        ICD-9 : 401.9

ICD-10 : I10

                                                    2019

 

                                        Appointment: Akanksha Driver

WPtel:[phone]

                                        [address] [place], [place] [address]

[place]                                              FOLLOW UP       2019

 

                          Patient Education: metoprolol tartrate- [employer] [facility] [id number] 

[email or web address]://[email or web address]/samplemd/resources/getResource/61/787m4z99-2chg-77tj-90

                                        Completed           2019

 

                                        Appointment: Akanksha Driver

WPtel:[phone]

                                        [address] [place], [place] [address]

[place]                                              CANCELED        02/15/2019

 

                          Visit Diagnosis Plan: Gastro-esophageal reflux disease

 without esophagitis 

Discussion: Continue protonix and carafate Proceed with updated EGD

                                        ICD-9 : 530.81

ICD-10 : K21.9

                                                    2018

 

                          Visit Diagnosis Plan: Epigastric pain Discussion: [facility] CT abdomen/pelvis due to

ongoing abdominal pain

                                        ICD-9 : 789.06

ICD-10 : R10.13

                                                    2018

 

                                        Appointment: Akanksha Driver

WPtel:[phone]

                                        [facility]

[place]                                              FOLLOW UP       2018

 

                    Care Plan: CT PELVIS W/O DYE                     LOINC : 361

08-9

                          Pending                   2018

 

                    Care Plan: CT ABDOMEN W/O DYE                     LOINC : 36

103-0

                          Pending                   2018

 

                    Care Plan: Referral Order                     SNOMED-CT : 30

[id number]

                          Pending                   2018

 

                                        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

art disease of native coronary artery 

without angina pectoris                 Discussion: S/P cardiac cath--doing medi

vicki management 

with imdur and metoprolol increased Has fwup with cardiology next week Discussed
cardiac rehab

                                        ICD-9 : 414.00

ICD-10 : I25.10

                                                    2018

 

                          Visit Diagnosis Plan: Gastro-esophageal reflux disease

 without esophagitis 

Discussion: Continue protonix at BID dosing and carafate BID

Follow Up: 2 months

                                        ICD-9 : 530.81

ICD-10 : K21.9

                                                    2018

 

                          Visit Diagnosis Plan: Essential (primary) hypertension

 Discussion: Stable

                                        ICD-9 : 401.9

ICD-10 : I10

                                                    2018

 

                          Visit Diagnosis Plan: Generalized anxiety disorder Dis

cussion: Stable

                                        ICD-9 : 300.00

ICD-10 : F41.1

                                                    2018

 

                                        Appointment: Akanksha Driver

WPtel:[phone]

                                        [facility]

[place]                                              FOLLOW UP       2018

 

                          Visit Diagnosis Plan: Gastro-esophageal reflux disease

 without esophagitis 

Discussion: Continue protonix at BID dosing Continue carafate at q AC and HS 
dosing for 2 more weeks then decrease to BID dosing

Follow Up: 4 weeks

                                        ICD-9 : 530.81

ICD-10 : K21.9

                                                    10/02/2018

 

                          Visit Diagnosis Plan: Generalized anxiety disorder Dis

cussion: Increase xanax to

BID dosing especially with upcoming cardiac testing which is already making 
patient more anxious and worried

                                        ICD-9 : 300.00

ICD-10 : F41.1

                                                    10/02/2018

 

                          Visit Diagnosis Plan: Palpitations Discussion: Bobby

carltonchristian going to schedule 

Lexiscan

                                        ICD-9 : 785.1

ICD-10 : R00.2

                                                    10/02/2018

 

                          Visit Diagnosis Plan: Urinary tract infection, site no

t specified Discussion: 

Reculture urine

                                        ICD-9 : 599.0

ICD-10 : N39.0

                                                    10/02/2018

 

                                        Appointment: Aaknksha Driver

WPtel:[phone]

                                        [address] [place] [address]                                              FOLLOW UP       10/02/2018

 

             Patient Education: Patient Medication Summary                      

     Completed    10/02/2018

 

                                        Appointment: Akanksha Driver

WPtel:[phone]

                                        [address] [place] [address]                                              LAB             2018

 

             Patient Education: Patient Medication Summary                      

     Completed    2018

 

                          Visit Diagnosis Plan: Generalized anxiety disorder Dis

cussion: Did discuss 

increased risk of benzodiazepines causing dementia but at this time will stay at
xanax 0.25mg po BID

                                        ICD-9 : 300.00

ICD-10 : F41.1

                                                    2018

 

                          Visit Diagnosis Plan: Epigastric pain Discussion: Cont

inue protonix and carafate

but make into a slurry Flu shot given Discussed EGD if symptoms persist

Follow Up: 2 weeks

                                        ICD-9 : 789.06

ICD-10 : R10.13

                                                    2018

 

                                        Appointment: Akaknsha Driver

WPtel:[phone]

                                        [address] [place] [address]                                              FOLLOW UP       2018

 

             Patient Education: Patient Medication Summary                      

     Completed    2018

 

                          Visit Diagnosis Plan: Essential (primary) hypertension

 Discussion: Has 

hydralazine to use prn per cardiology Going to do 30 day holter monitor

                                        ICD-9 : 401.9

ICD-10 : I10

                                                    2018

 

                          Visit Diagnosis Plan: Hypoglycemia, unspecified Discus

madeleine: 6 small meals a 

day--each with protein Increase fluid intake

                                        ICD-9 : 251.2

ICD-10 : E16.2

                                                    2018

 

                          Visit Diagnosis Plan: Gastro-esophageal reflux disease

 without esophagitis 

Discussion: Change to protonix 40mg po BID

Follow Up: 1 months

                                        ICD-9 : 530.81

ICD-10 : K21.9

                                                    2018

 

                                        Appointment: Akanksha Driver

WPtel:[phone] [address]                                              ACUTE ILLNESS   2018

 

             Patient Education: Patient Medication Summary                      

     Completed    2018

 

                          Visit Diagnosis Plan: Dizziness and giddiness Discussi

on: blood work to be 

completed in office including cmp, cbc, troponin to rule out glucose 
intolerance, infection, dehydration. instructed patient that she needs to see 
her cardiologist sooner than oct and patient requested we contact dr. clements's 
office. will have front office make appt. patient has carotid doppler annually.

                                        ICD-9 : 780.4

ICD-10 : R42

                                                    2018

 

                                        Appointment: Nat Lozoya

                                        [address] [place] [address]                                              ACUTE ILLNESS   2018

 

             Patient Education: Patient Medication Summary                      

     Completed    2018

 

                          Visit Diagnosis Plan: Cervicalgia Discussion: Complete

 course of PT since 

symptoms improved Continue stretching exercises Notify if symptoms return or 
worsen

                                        ICD-9 : 723.1

ICD-10 : M54.2

                                                    2018

 

                                        Appointment: Akanksha Driver

WPtel:[phone]

                                        [facility] [address]                                              FOLLOW UP       2018

 

             Patient Education: Patient Medication Summary                      

     Completed    2018

 

                          Visit Diagnosis Plan: Vertigo of central origin, bilat

eral Discussion: Discussed

PT for vestibular therapy

                                        ICD-9 : 386.2

ICD-10 : H81.43

                                                    2018

 

                          Visit Diagnosis Plan: Other spondylosis with radiculop

athy, cervical region 

Discussion: Check MRI of cervical spine

                                        ICD-9 : 721.0

ICD-10 : M47.22

                                                    2018

 

                          Visit Diagnosis Plan: Hypoglycemia, unspecified Discus

madeleine: Eat something with 

protein every 2 hrs

                                        ICD-9 : 251.2

ICD-10 : E16.2

                                                    2018

 

                                        Appointment: Akanksha Driver

WPtel:[phone] [address] [facility]

                                                              2018

 

             Patient Education: Patient Medication Summary                      

     Completed    2018

 

                    Care Plan: MRI NECK SPINE W/O DYE                     [facility] 

: 73214-1

                          Pending                   2018

 

                          Visit Diagnosis Plan: Benign paroxysmal vertigo, bilat

eral Discussion: patient 

has meclizine at home but states it makes her too tired. instructed patient to 
cut in half and take at night. if it doesn't cause too much drowsiness, 
instructed to take bid until feeling better. instructed to rtc wed if no 
improvement or worsening symptoms. instructed patient to increase fluid intake 
as well.

                                        ICD-9 : 386.11

ICD-10 : H81.13

                                                    2018

 

                          Visit Diagnosis Plan: Otalgia, bilateral Discussion: k

enalog 40 mg given to 

patient im. instructed patient to monitor blood sugar at home due to risk of 
increased sugar. instructed patient to rtc on wednesday if no improvement and 
may require antibiotic.

                                        ICD-9 : 388.70

ICD-10 : H92.03

                                                    2018

 

                                        Appointment: Nat Lozoya

                                        [address] [place] [address]                                              ACUTE ILLNESS   2018

 

             Patient Education: Patient Medication Summary                      

     Completed    2018

 

                          Visit Diagnosis Plan: Low back pain Discussion: Stretc

hes Topical aspercreme 

Tylenol 1 po TID

                                        ICD-9 : 724.2

ICD-10 : M54.5

                                                    2018

 

                          Visit Diagnosis Plan: Radiculopathy, lumbosacral regio

n Discussion: As above

                                        ICD-9 : 724.4

ICD-10 : M54.17

                                                    2018

 

                          Visit Diagnosis Plan: Left lower quadrant pain Discuss

ion: Culture urine Notify 

if worsens

                                        ICD-9 : 789.04

ICD-10 : R10.32

                                                    2018

 

                                        Appointment: Akanksha Driver

WPtel:[phone]

                                        [address] [place] [address]                                              ACUTE ILLNESS   2018

 

             Patient Education: Patient Medication Summary                      

     Completed    2018

 

                                        Appointment: Akanksha Driver

WPtel:[phone]

                                        [address] [place] [address]

[place]                                              INJECTION       10/06/2017

 

             Patient Education: Patient Medication Summary                      

     Completed    10/06/2017

 

                                        Appointment: Akanksha Driver

WPtel:[phone]

                                        [address] [place], [place] [id number]

[place]                                              LAB             2017

 

             Patient Education: Patient Medication Summary                      

     Completed    2017

 

                          Visit Diagnosis Plan: Other intervertebral disc degene

ration, lumbar region 

Follow Up: 3 months

                                        ICD-9 : 722.52

ICD-10 : M51.36

                                                    2017

 

                          Visit Diagnosis Plan: Pain in thoracic spine Discussio

n: PT has helped Continue 

stretches and walking [facility]ed [facility]

                                        ICD-9 : 724.1

ICD-10 : M54.6

                                                    2017

 

                                        Appointment: Akanksha Driver

WPtel:[phone]

                                        [address] [place], [place] [id number]

                                              FOLLOW UP       2017

 

             Patient Education: Patient Medication Summary                      

     Completed    2017

 

                          Visit Diagnosis Plan: Left lower quadrant pain Discuss

ion: Had CT scan of 

abdomen/pelvis in 2017 and normal colonoscopy in 2015

                                        ICD-9 : 789.04

ICD-10 : R10.32

                                                    2017

 

                          Visit Diagnosis Plan: Low back pain Discussion: Start 

PT for low back- Seems 

like abdominal pain is radiating from low back

Follow Up: 5 weeks

                                        ICD-9 : 724.2

ICD-10 : M54.5

                                                    2017

 

                                        Appointment: Akanksha Driver

WPtel:[phone]

                                        [address] [place], [place] [id number]

                                              ACUTE ILLNESS   2017

 

             Patient Education: Patient Medication Summary                      

     Completed    2017

 

                                        Appointment: Akanksha Driver

WPtel:[phone]

                                        [address] [place], [place] [id number]

[place]                                              LAB             2017

 

             Patient Education: Patient Medication Summary                      

     Completed    2017

 

                          Visit Diagnosis Plan: Mixed hyperlipidemia Discussion:

 Check lipids

                                        ICD-9 : 272.4

ICD-10 : E78.2

                                                    2017

 

                          Visit Diagnosis Plan: Mastodynia Discussion: Check Jace

ateral diagnostic 

mammogram with US

                                        ICD-9 : 611.71

ICD-10 : N64.4

                                                    2017

 

                          Visit Diagnosis Plan: Impaired fasting glucose Discuss

ion: Return in AM for CMP,

HbA1C Accuchecks daily Diet/Exercise discussed at length

                                        ICD-9 : 790.29

ICD-10 : R73.01

                                                    2017

 

                          Visit Diagnosis Plan: Other fatigue Discussion: Check 

CBC, TSH

                                        ICD-9 : 780.79

ICD-10 : R53.83

                                                    2017

 

                                        Appointment: Akanksha Drivertel:[phone]

                                        [address] [place] [address]              3/ confirmed `sl                 ACUTE ILLNESS   2017

 

             Patient Education: Patient Medication Summary                      

     Completed    2017

 

                    Care Plan: MAMMOGRAM SCREENING                     LOINC : 2

6347-5

                          Pending                   2017

 

                          Visit Diagnosis Plan: Acute upper respiratory infectio

n, unspecified Discussion:

Exam is reassuring Likely viral illness Supportive care reviewed and encouraged 
Follow up PRN

                                        ICD-9 : 465.9

ICD-10 : J06.9

                                                    2017

 

                                        Appointment: Luba Stevenson 

                                        [address] [place] [address]                                              ACUTE ILLNESS   2017

 

             Patient Education: Patient Medication Summary                      

     Completed    2017

 

                          Visit Plan:               Supportive care. Rest, Fluid

s, Tylenol/Motrin prn fever or 

bodyaches. Notify if worsening symptoms.

                                                            2016

 

                                        Appointment: Akanksha Drivertel:[phone]

                                        [address] [place] [address]                                              ACUTE ILLNESS   2016

 

             Patient Education: Patient Medication Summary                      

     Completed    2016

 

                                        Appointment: Akanksha Driver

WPtel:[phone]

                                        [address] [place] [address]

[place]                                              INJECTION       10/07/2016

 

             Patient Education: Patient Medication Summary                      

     Completed    10/07/2016

 

                                        Appointment: Akanksha Driver

WPtel:[phone]

                                        [address] [place] [address]                                              LAB             2016

 

             Patient Education: Patient Medication Summary                      

     Completed    2016

 

                          Visit Plan:               Add miralax daily Use daily 

probiotic and metamucil and push fluids

Notify if worsens/persists

                                                            2016

 

                                        Appointment: Akanksha Driver

WPtel:[phone]

                                        [address] [facility]

              16 confirmed ~sl                 ACUTE ILLNESS   2016

 

             Patient Education: Patient Medication Summary                      

     Completed    2016

 

                          Visit Plan:               No NSAIDs Kidney function di

scussed Discussed hydration Monitor lab

every 4months so will check CMP, HbA1C next month

                                                            2016

 

                                        Appointment: Akanksha Driver

WPtel:[phone]

                                        [address] [place] [address]              03/15 confirmed ~sl                 ACUTE ILLNESS   2016

 

             Patient Education: Patient Medication Summary                      

     Completed    2016

 

                          Visit Plan:               Vestibular exercises Refill 

flonase Strict low Na diet--discussed 

that needs to read labels Rx for walker with chair given due to muscle 
weakness/unsteadiness Has carotids and abdominal aorta and legs checked in 
January Check Chem 7

                                                            2015

 

                                        Appointment: Akanksha Driver

WPtel:[phone]

                                        [address] [place] [address]              12/15/15 appt confirmed cn                 FOLLOW UP       

 

             Patient Education: Patient Medication Summary                      

     Completed    2015

 

             Patient Education: Vertigo                           Completed    1

2015

 

                                        Appointment: Akanksha Driver

WPtel:[phone]

                                        [address] [place], [place] [id number]

[place]                                              INJECTION       10/16/2015

 

             Patient Education: Patient Medication Summary                      

     Completed    10/16/2015

 

                                        Appointment: Akanksha Driver

WPtel:[phone]

                                        [address] [place], [place] [id number]

[place]                                              UA              09/15/2015

 

             Patient Education: Patient Medication Summary                      

     Completed    09/15/2015

 

                                        Referral: Landon De La Torre

WPtel:[phone]

#1 [facility] ROSA

[id number]

[place]              Referral                        Completed       2015

 

                                        Appointment: Akanksha Driver

WPtel:[phone]

                                        [address] [place], [place] [id number]

[place]                                              LAB             09/10/2015

 

             Patient Education: Patient Medication Summary                      

     Completed    09/10/2015

 

                          Visit Plan:               Check CMP, CBC, TSH, Free T4

, B12, Lipids, HbA1C Discussed 6 small 

meals a day each with protein Would likely benefit from antidepressant Update 
colonoscopy

                                                            2015

 

                                        Appointment: Akanksha Driver

WPtel:[phone]

                                        [address] [place], [place] [id number]              9/8/15 confirmed                 ACUTE ILLNESS   2015

 

             Patient Education: Patient Medication Summary                      

     Completed    2015

 

                          Visit Plan:               Cerumen flush with warm wate

r and peroxide - good results Resume 

Nasonex nasal spray daily Recommended Debrox earwax removal drops

                                                            2015

 

                                        Appointment: Bertha Mon

WPtel:[phone]

                                        [address] [place] [address]                                              ACUTE ILLNESS   2015

 

             Patient Education: Patient Medication Summary                      

     Completed    2015

 

                          Visit Plan:               Continue aspirin daily Add m

eloxicam for 1week Elevate and Ice Call

on 2015

 

                                        Appointment: Akanksha Driver

WPtel:[phone]

                                        [address] [place] [address]                                              ACUTE ILLNESS   2015

 

             Patient Education: Patient Medication Summary                      

     Completed    2015

 

                          Visit Plan:               Been using baclofen at [facility] and has helped some PT for next 

2-4weeks

                                                            2015

 

                                        Appointment: Akanksha Driver

WPtel:[phone]

                                        [address] [place], [place] [address]                                              Hospital Follow Up 2015

 

             Patient Education: Patient Medication Summary                      

     Completed    2015

 

                                        Appointment: Akanksha Driver

WPtel:[phone]

                                        [address] [place], [place] [id number]                                              LAB             2015

 

                                        Appointment: Akanksha Driver

WPtel:[phone]

                                        [address] [place] [address]              feeling better, weather -                 FOLLOW UP       

 

                                        Referral: Landon De La Torre

WPtel:[phone]

#1 [facility]              Referral                        Appointment Requested 2015

 

                          Visit Plan:               Continue exercise and curren

t meds See surgery for removal of right

arm lesion

                                                            2015

 

                                        Appointment: Akanksha Driver

WPtel:[phone]

                                        [address] [place] [address]                                              FOLLOW UP       2015

 

             Patient Education: Patient Medication Summary                      

     Completed    2015

 

                                        Appointment: Akanksha Driver

WPtel:[phone]

                                        [address] [place], [place] [address]

US                                              INJECTION       10/17/2014

 

             Patient Education: Patient Medication Summary                      

     Completed    10/17/2014

 

                                        Appointment: Bertha Mon

WPtel:[phone]

                                        [address] [place] [address]                                              ACUTE ILLNESS   2014

 

             Patient Education: Patient Medication Summary                      

     Completed    2014

 

                          Visit Plan:               Discussed that likely lumbar

 etiology for leg weakness Will change 

amlodopine to low dose dyazide and see if helps legs and inner ear

                                                            2014

 

                                        Appointment: Akanksha Driver

WPtel:[phone]

                                        [address] [place] [address]                                              FOLLOW UP       2014

 

             Patient Education: Patient Medication Summary                      

     Completed    2014

 

                          Visit Plan:               Ceftin and continue claritin

/flonase Decrease amlodopine to 2.5mg q

HS until fwup with Card due to weakness

                                                            2014

 

                                        Appointment: Akanksha Driver

WPtel:[phone]

                                        [address] [place] [address]                                              ACUTE ILLNESS   2014

 

             Patient Education: Patient Medication Summary                      

     Completed    2014

 

                                        Appointment: Akanksha Driver

WPtel:[phone]

                                        [address] [place], [place] [id number]                                              LAB             2014

 

             Patient Education: Patient Medication Summary                      

     Completed    2014

 

                                        Appointment: Bertha Mon

WPtel:[phone]

                                        [address] [place], [place] [id number]                                              ACUTE ILLNESS   2014

 

             Patient Education: Patient Medication Summary                      

     Completed    2014

 

                          Visit Plan:               Supportive care. Rest, Fluid

s, Tylenol prn fever or bodyaches. 

Notify if worsening symptoms. Ceftin

                                                            10/15/2013

 

                                        Appointment: Bertha Mon

WPtel:[phone]

                                        [address] [place] [address]                                              ACUTE ILLNESS   10/15/2013

 

             Patient Education: Patient Medication Summary                      

     Completed    10/15/2013

 

                                        Appointment: Akanksha Driver

WPtel:[phone]

                                        [address] [place] [address]                                              BP CHECK        2013

 

             Patient Education: Patient Medication Summary                      

     Completed    2013

 

                                        Appointment: Akanksha Driver

WPtel:[phone]

                                        [address] [place] [address]                                              ER Follow UP    2013

 

             Patient Education: Patient Medication Summary                      

     Completed    2013

 

                          Visit Plan:               Restart flonase BID Use mecl

izine 25mg q HS Vestibular exercises 

Claritin 10mg q AM See ENT if doesn't resolve

                                                            2013

 

                                        Appointment: Akanksha Driver

WPtel:[phone]

                                        [address] [place] [address]                                              ACUTE ILLNESS   2013

 

             Patient Education: Patient Medication Summary                      

     Completed    2013

 

                          Visit Plan:               Will continue to observe

                                                            2013

 

                                        Appointment: Akanksha Driver

WPtel:[phone]

                                        [address] [place] [address]                                              FOLLOW UP       2013

 

             Patient Education: Patient Medication Summary                      

     Completed    2013

 

                          Visit Plan:               ERx for Augmentin 875mg q12 

x 10 days and Floxin otic drops 5 gtts 

AD x7days Sample of Nasonex per pt request Discussed treatment (nasal saline, 
salt water gargles, keeping right ear clean and dry, for worsening go to UC on 
weekend, Tylenol/ibuprofen, etc.) RTC 2 weeks for ear recheck.

                                                            05/10/2013

 

                                        Appointment: Jill Jean 

WPtel:[phone]

                                        [address] [place] [address]                                              ACUTE ILLNESS   05/10/2013

 

             Patient Education: Patient Medication Summary                      

     Completed    05/10/2013

 

                          Visit Plan:               Decrease caffeine intake Marina

ck Bilateral Mammogram with US of left 

breast

                                                            2013

 

                                        Appointment: Akanksha Driver

WPtel:[phone]

                                        [address] [place] [address]                                              ACUTE ILLNESS   2013

 

             Patient Education: Patient Medication Summary                      

     Completed    2013

 

                                        Appointment: Kate Hall

WPtel:[phone]

                                        [address] [place] [address]              appt scheduled                  ACUTE ILLNESS   2013

 

                                        Appointment: Akanksha Driver

WPtel:[phone]

                                        [address] [place], [place] [address]                                              LAB             2012

 

             Patient Education: Patient Medication Summary                      

     Completed    2012

 

                          Visit Plan:               Continue off carafate and se

e how does Continue probiotic Add 

Vestibular exercises and use meclizine prn Continue Nasonex Check fasting lab 
including CMP, Lipids, CBC, TSH, Free T4, HbA1C

                                                            2012

 

                                        Appointment: Akanksha Driver

WPtel:[phone]

                                        [address] [place] [address]                                              FOLLOW UP       2012

 

             Patient Education: Patient Medication Summary                      

     Completed    2012

 

                          Visit Plan:               Continue carafate for 4more 

weeks at current dose then decrease to 

BID for 2wks then q HS

                                                            10/23/2012

 

                                        Appointment: Akanksha Driver

WPtel:[phone]

                                        [address] [place], [place] [id number]                                              FOLLOW UP       10/23/2012

 

             Patient Education: Patient Medication Summary                      

     Completed    10/23/2012

 

                          Visit Plan:               Continue omeprazole Add Ellyn

fate 1gm po q AC Add daily probiotic

                                                            10/10/2012

 

                                        Appointment: Akanksha Driver

WPtel:[phone]

                                        [address] [place] [address]                                              ACUTE ILLNESS   10/10/2012

 

             Patient Education: Patient Medication Summary                      

     Completed    10/10/2012

 

                                        Appointment: Akanksha Driver

WPtel:[phone]

                                        [address] [place], [place] [id number]

US                                              INJECTION       2012

 

             Patient Education: Patient Medication Summary                      

     Completed    2012

 

                          Visit Plan:               Diabetic Diet Accuchecks BID

 alternating times Hold onglyza and 

Januvia for now and continue diet and exercise and weight loss and drew LOREDO 
Discussed hypoglycemia and snack of peanut butter and crackers with juice or 
milk

                                                            2012

 

                                        Appointment: Akanksha Drivertel:[phone]

                                        [address] [place] [address]                                              WORK [place]         2012

 

             Patient Education: Patient Medication Summary                      

     Completed    2012

 

                                        Appointment: Akanksha Driver

WPtel:[phone]

                                        [address] [place] [address]              2012

 

             Patient Education: Patient Medication Summary                      

     Completed    2012

 

                                        Appointment: Akanksha Driver

WPtel:[phone]

                                        [address] [place] [address]                                              LAB             2012

 

             Patient Education: Patient Medication Summary                      

     Completed    2012

 

                                        Appointment: Akanksha Driver

WPtel:[phone]

                                        [address] [place] [address]                                              ACUTE ILLNESS   2012

 

             Patient Education: Patient Medication Summary                      

     Completed    2012

 

                          Visit Plan:               Injection to Right SI joint 

as above Pt will call in 3 days on pain

Has PT starting in 2wks.

                                                            2012

 

                                        Appointment: Akanksha Driver

WPtel:[phone]

                                        [address] [place] [address]                                              ACUTE ILLNESS   2012

 

             Patient Education: Patient Medication Summary                      

     Completed    2012

 

                          Visit Plan:               OMT done Start PT May need u

pdated MRI if need to consider epidural

Prednisone

                                                            2012

 

                                        Appointment: Akanksha Driver

WPtel:[phone]

                                        [address] [place] [address]                                              OMT             2012

 

             Patient Education: Patient Medication Summary                      

     Completed    2012

 

                          Visit Plan:               Flexeril and Vimovo OMT done

 Daily stretches

                                                            2012

 

                                        Appointment: Akanksha Driver

WPtel:[phone]

                                        [address] [place], [place] [id number]

                                              ACUTE ILLNESS   2012

 

             Patient Education: Patient Medication Summary                      

     Completed    2012

 

                          Visit Plan:               OMT done Daily stretches Vinod

st heat or biofreeze Right SI joint 

injection Call in 1week Vimovo BID

                                                            2012

 

                                        Appointment: Akanksha Driver

WPtel:[phone]

                                        [address] [place], [place] [address]                                              ACUTE ILLNESS   2012

 

             Patient Education: Patient Medication Summary                      

     Completed    2012

 

                                        Appointment: Akanksha Driver

WPtel:[phone]

                                        [address] [place], [place] [id number]

US                                              LAB             2012

 

             Patient Education: Patient Medication Summary                      

     Completed    2012

 

                          Visit Plan:               Decrease amlodopine to 1.25m

g daily Schedule with Cardiology 

Fasting lab in AM

                                                            2012

 

                                        Appointment: Akanksha Driver

WPtel:[phone]

                                        [address] [place] [address]                                              ACUTE ILLNESS   2012

 

             Patient Education: Patient Medication Summary                      

     Completed    2012

 

                          Visit Plan:               Saline nasal flushes prn. Ty

lenol/Motrin prn headache. Notify if 

persists/symptoms worsening. Cerumen removal from ears as above

                                                            2011

 

                                        Appointment: Akanksha Driver

WPtel:[phone]

                                        [address] [place], [place] [id number]

                                              ACUTE ILLNESS   2011

 

             Patient Education: Patient Medication Summary                      

     Completed    2011

 

                                        Appointment: Akanksha Driver

WPtel:[phone]

                                        [address] [place], [place] [id number]

US                                              INJECTION       10/05/2011

 

             Patient Education: Patient Medication Summary                      

     Completed    10/05/2011

 

                          Visit Plan:               Continue vimovo for 1more we

ek Increase Omeprazole to BID for 1mo 

then resume QD if stomach improved Vestibular exercises with meclizine q HS for 
next week

                                                            2011

 

                                        Appointment: Akanksha Driver

WPtel:[phone]

                                        [address] [place] [address]                                              FOLLOW UP       2011

 

             Patient Education: Patient Medication Summary                      

     Completed    2011

 

                                        Appointment: Akanksha Driver

WPtel:[phone]

                                        [address] [place] [address]                                              ACUTE ILLNESS   2011

 

             Patient Education: Patient Medication Summary                      

     Completed    2011

 

                                        Appointment: Akanksha Driver

WPtel:[phone]

                                        [address] [place] [address]                                              LAB             2011

 

             Patient Education: Patient Medication Summary                      

     Completed    2011

 

                          Visit Plan:               Saline nasal flushes prn. Ty

lenol/Motrin prn headache. Notify if 

persists/symptoms worsening. Add Veramyst Increase Omeprazole to 20mg po BID

                                                            2011

 

                                        Appointment: Akanksha Driver

WPtel:[phone]

                                        [address] [place] [address]                                              ACUTE ILLNESS   2011

 

             Patient Education: Patient Medication Summary                      

     Completed    2011

 

                                        Appointment: Akanksha Driver

WPtel:[phone]

                                        [address] [place] [address]                                              FOLLOW UP       2011

 

             Patient Education: Patient Medication Summary                      

     Completed    2011

 

                                        Appointment: Akanksha Driver

WPtel:[phone]

                                        [address] [place] [address]                                              ACUTE ILLNESS   2011

 

             Patient Education: Patient Medication Summary                      

     Completed    2011

 

                          Visit Plan:               Nasocourt sample given. Pt. 

will notify if symptoms are worse on 

Monday.

                                                            2010

 

                                        Appointment: Kate Hall

WPtel:[phone]

                                        [address] [place] [address]                                              ACUTE ILLNESS   2010

 

             Patient Education: Patient Medication Summary                      

     Completed    2010

 

                                        Appointment: Akanksha Driver

WPtel:[phone]

                                        [address] [place], [place] [id number]

US                                              INJECTION       10/06/2010

 

             Patient Education: Patient Medication Summary                      

     Completed    10/06/2010

 

                                        Appointment: Akanksha Driver

WPtel:[phone]

                                        [address] [place] [address]                                              FOLLOW UP       2010

 

             Patient Education: Patient Medication Summary                      

     Completed    2010

 

             Patient Education: Lexapro                           Completed    0

2010

 

                          Visit Plan:               May proceed with hiatal mykel

ia repair per Card. so will contact Dr. Cash's office to proceed with surgery Trial of Lexapro 5mg QD plus use 
xanax prn

                                                            2010

 

                                        Appointment: Akanksha Driver

WPtel:[phone]

                                        [address] [place] [address]                                              FOLLOW UP       2010

 

             Patient Education: Patient Medication Summary                      

     Completed    2010

 

                          Visit Plan:               B12 given Cont oral B12 and 

iron Fwup 1mo for B12 Proceed with 

hiatal hernia repair once card clearance

                                                            2010

 

                                        Appointment: Akanksha Driver

WPtel:[phone]

                                        [address] [place] [address]                                              FOLLOW UP       2010

 

             Patient Education: Patient Medication Summary                      

     Completed    2010

 

                                        Appointment: Akanksha Driver

WPtel:[phone]

                                        [address] [place] [address]                                              FOLLOW UP       2010

 

             Patient Education: Patient Medication Summary                      

     Completed    2010

 

                                        Appointment: Akanksha Driver

WPtel:[phone]

                                        [address] [place] [address]

[place]                                              LAB             2010

 

             Patient Education: Patient Medication Summary                      

     Completed    2010

 

                          Visit Plan:               Check fasting lab in AM--CMP

,Lipids, CBC, Vit D, TSH,FreeT4, B12

                                                            2010

 

                                        Appointment: Akanksha Driver

WPtel:[phone]

                                        [address] [place] [address]

[place]                                              FOLLOW UP       2010

 

             Patient Education: Patient Medication Summary                      

     Completed    2010

 

                                        Referral: Landon De La Torre

WPtel:[phone]

[address] [place] [id number]

[place]              Referral                        Appointment Requested  

 

                                        Referral: Landon De La Torre

WPtel:[phone]

[facility]

[id number]

[place]              Referral                        Initiated        







Instructions





                                        Comment

 

                                        . Supportive care.  Rest, Fluids, Tyleno

l/Motrin prn fever or bodyaches.  Notify

if worsening symptoms.



 

                                        . Add miralax daily

Use daily probiotic and metamucil and push fluids

Notify if worsens/persists

 

                                        . No NSAIDs

Kidney function discussed

Discussed hydration 

Monitor lab every 4months so will check CMP, HbA1C next month

 

                                        . Vestibular exercises

Refill flonase

Strict low Na diet--discussed that needs to read labels

Rx for walker with chair given due to muscle weakness/unsteadiness

Has carotids and abdominal aorta and legs checked in January

Check Chem 7



 

                                        . Check CMP, CBC, TSH, Free T4, B12, Lip

ids, HbA1C

Discussed 6 small meals a day each with protein

Would likely benefit from antidepressant 

Update colonoscopy

 

                                        . Cerumen flush with warm water and tyler

xide - good results 

Resume Nasonex nasal spray daily

Recommended Debrox earwax removal drops 

 

                                        . Continue aspirin daily

Add meloxicam for 1week

Elevate and Ice

Call on Monday

 

                                        . Been using baclofen at bedtime and has

 helped some

PT for next 2-4weeks



 

                                        . Continue exercise and current meds

See surgery for removal of right arm lesion

 

                                        . Discussed that likely lumbar etiology 

for leg weakness

Will change amlodopine to low dose dyazide and see if helps legs and inner ear

 

                                        . Ceftin and continue claritin/flonase

Decrease amlodopine to 2.5mg q HS until fwup with Card due to weakness

 

                                        .  Supportive care.  Rest, Fluids, Tylen

ol prn fever or bodyaches.  Notify if 

worsening symptoms.

Ceftin

 

                                        . Restart flonase BID

Use meclizine 25mg q HS

Vestibular exercises

Claritin 10mg q AM

See ENT if doesn't resolve

 

                                        . Will continue to observe



 

                                        . ERx for Augmentin 875mg q12 x 10 days 

and Floxin otic drops 5 gtts AD x7days

Sample of Nasonex per pt request

Discussed treatment (nasal saline, salt water gargles, keeping right ear clean 
and dry, for worsening go to UC on weekend, Tylenol/ibuprofen, etc.)

RTC 2 weeks for ear recheck.

 

                                        . Decrease caffeine intake

Check Bilateral Mammogram with US of left breast

 

                                        Left ear flushed with warm water and per

oxide with ear syringe and then last 

removed with currette--peroxide drops instilled.  Tolerated well, no 
complications, TM intact.. Continue off carafate and see how does

Continue probiotic

Add Vestibular exercises and use meclizine prn

Continue Nasonex

Check fasting lab including CMP, Lipids, CBC, TSH, Free T4, HbA1C

 

                                        . Continue carafate for 4more weeks at c

urrent dose then decrease to BID for 

2wks then q HS

 

                                        . Continue omeprazole

Add Carafate 1gm po q AC

Add daily probiotic



 

                                        . Diabetic Diet

Accuchecks BID alternating times

Hold onglyza and Januvia for now and continue diet and exercise and weight loss 
and moniter BS

Discussed hypoglycemia and snack of peanut butter and crackers with juice or 
milk

 

                                        Informed Consent obtainded.  Right SI yasir

int cleansed with alcohol and betadine 

and injected with 3cc 1%l lidocaine with 40mg depomedrol and 40mg kenalog, 
tolerated well with no complications, neosporin and bandage applied. Injection 
to Right SI joint as above

Pt will call in 3 days on pain

Has PT starting in 2wks.

 

                                        . OMT done

Start PT

May need updated MRI if need to consider epidural

Prednisone

 

                                        . Flexeril and Vimovo

OMT done

Daily stretches

 

                                        Right SI joint cleansed with alchohol an

d betadine and injected with 3cc 1% 

lidocaine with 40mg depomedrol and 40mg kenalog, tolerated well with no 
complications, neosporin and bandage applied. OMT done

Daily stretches

Moist heat or biofreeze

Right SI joint injection

Call in 1week

Vimovo BID

 

                                        . Decrease amlodopine to 1.25mg daily

Schedule with Cardiology

Fasting lab in AM

 

                                        .  Saline nasal flushes prn. Tylenol/Mot

rin prn headache.  Notify if 

persists/symptoms worsening.

Cerumen removal from ears as above



 

                                        . Continue vimovo for 1more week

Increase Omeprazole to BID for 1mo then resume QD if stomach improved

Vestibular exercises with meclizine q HS for next week

 

                                        . Saline nasal flushes prn. Tylenol/Motr

in prn headache.  Notify if 

persists/symptoms worsening.

Add Veramyst

Increase Omeprazole to 20mg po BID

 

                                        . Nasocourt sample given.  Pt. will noti

fy if symptoms are worse on Monday. 

 

                                        . May proceed with hiatal hernia repair 

per Card. so will contact Dr. 

Beckenhauer's office to proceed with surgery



Trial of Lexapro 5mg QD plus use xanax prn

 

                                        . B12 given

Cont oral B12 and iron

Fwup 1mo for B12

Proceed with hiatal hernia repair once card clearance

 

                                        . Check fasting lab in AM--CMP,Lipids, C

BC, Vit D, TSH,FreeT4, B12







Medical Equipment

No Medical Equipment data



Health Concerns Section

Health Concerns data not found



Goals Section

Goals data not found



Interventions Section

Interventions data not found



Health Status Evaluations/Outcomes Section

Health Status Evaluations/Outcomes data not found



Advance Directives

No Advance Directive data

## 2020-02-19 NOTE — XMS REPORT
CCD document using C-CDA

                             Created on: 2019



Leilani Ramírez

External Reference #: [id number]

: 1940

Sex: Female



Demographics





                          Address                   [address] [place]  [address]

 

                          Home Phone                [phone]

 

                          Preferred Language        English

 

                          Marital Status            

 

                          Buddhism Affiliation     Unknown

 

                          Race                      White

 

                          Ethnic Group              Not  or 





Author





                          Author                    Leilani Driver D.O.

 

                          Organization              [facility]

 

                          Address                   [address] [place]  [address]



 

                          Phone                     [phone]







Care Team Providers





                    Care Team Member Name Role                Phone

 

                    Akanksha Driver D.O. PP                  Unavailable

 

                          CCM                       Unavailable



                                            



Summary Purpose

          Interface Exchange                                                    
               



Insurance Providers

                      



                    Payer name                   Policy type / Coverage type    

               

Covered party ID                   Effective Begin Date                   

Effective End Date                

 

                    [employer] [place] B

                   

[id number]                   2018                   Unknown                

 

                    [employer]                   Medicare Part B 

                  

[id number]                   [id number]                   Unknown                



                                                                                
       



Family history

                      



Father            



                          Diagnosis                   Age At Onset              

  

 

                          Heart disease                   Unknown               

 



            



Mother            



                          Diagnosis                   Age At Onset              

  

 

                          Heart disease                   Unknown               

 

 

                          Cancer                    Unknown                



                                                                                
       



Social History

                      



                    Social History Element                   Codes              

     Description    

                                        Effective Dates                

 

                    Tobacco history                   SNOMED CT: [id number]      

             Never 

smoker                                  2011                

 

                    Marital status                   Unknown                   S

daniela               

                                        2010                

 

                    Number of children                   Unknown                

   9                

                                        2010                



                                                                                
                           



Allergies, Adverse Reactions, Alerts

                      



                Substance                   Reaction                   Codes    

               

Entered Date                   Inactivated Date                   Status        

       

 

                                                            * NO KNOWN FOOD STAR

RGIES                                   

                                    Unknown                   2010        

           No 

Inactive Date                           Active                

 

                                        _                                       

                  

Unknown                   2010                   No Inactive Date         

                                        Active                

 

                                        _                                       

                  

Unknown                   2019                   No Inactive Date         

                                        Active                

 

                                                            QUINIDINE-QUININE AN

ALOGUES                                 

                    hives,                    Unknown                   20

10               

                          No Inactive Date                   Active             

   



                                                                                
                           



Past Medical History

                      



                    Illness                   Codes                   Condition 

Status              

                          Onset Date                   Resolved Date            

    

 

                                                            Acute serous otitis 

media, left ear                         

                                        ICD-9: 381.01

ICD-10: H65.02                   Active                    2019           

 

                                        Unknown                

 

                                                            Coronary atheroscler

osis due to calcified coronary lesion   

                                        ICD-9: 414.00

ICD-10: I25.84                   Active                    2018           

 

                                        Unknown                

 

                                                            Essential (primary) 

hypertension                            

                                        ICD-9: 401.9

ICD-10: I10                   Active                    2014              

 

                                        Unknown                

 

                                                            Hypoglycemia, unspec

ified                                   

                                        ICD-9: 251.2

ICD-10: E16.2                   Active                    2017            

 

                                        Unknown                

 

                                              Other fatigue                     

                ICD-9: 

780.79

ICD-10: R53.83                   Active                    2017           

 

                                        Unknown                

 

                                                            Urinary tract infect

ion, site not specified                 

                                        ICD-9: 599.0

ICD-10: N39.0                   Active                    10/02/2018            

 

                                        Unknown                

 

                                                            Gastro-esophageal re

flux disease without esophagitis        

                                        ICD-9: 530.81

ICD-10: K21.9                   Active                    2018            

 

                                        Unknown                

 

                                              Epigastric pain                   

                  ICD-9: 

789.06

ICD-10: R10.13                   Active                    2018           

 

                                        Unknown                

 

                                                            Atherosclerotic hear

t disease of native coronary artery 

without angina pectoris                                     ICD-9: 414.00

ICD-10: I25.10                   Active                    2018           

 

                                        Unknown                

 

                                                            Generalized anxiety 

disorder                                

                                        ICD-9: 300.00

ICD-10: F41.1                   Active                    2018            

 

                                        Unknown                

 

                                              Palpitations                      

               ICD-9: 

785.1

ICD-10: R00.2                   Active                    10/02/2018            

 

                                        Unknown                

 

                                              Dizziness and giddiness           

                          

ICD-9: 780.4

ICD-10: R42                   Active                    2014              

 

                                        Unknown                

 

                                                            Occlusion and stenos

is of bilateral carotid arteries        

                                        ICD-9: 433.10

ICD-10: I65.23                   Active                    2018           

 

                                        Unknown                

 

                                              FLU VACCINE                       

              ICD-9: 

V04.81

ICD-10: Z23                   Active                    2012              

 

                                        Unknown                

 

                                              Cervicalgia                       

              ICD-9: 723.1

ICD-10: M54.2                   Active                    2018            

 

                                        Unknown                

 

                                                            Other spondylosis wi

th radiculopathy, cervical region       

                                        ICD-9: 721.0

ICD-10: M47.22                   Active                    2018           

 

                                        Unknown                

 

                                              Cervicocranial syndrome           

                          

ICD-9: 723.2

ICD-10: M53.0                   Active                    2018            

 

                                        Unknown                

 

                                                            Vertigo of central o

rigin, bilateral                        

                                        ICD-9: 386.2

ICD-10: H81.43                   Active                    2018           

 

                                        Unknown                

 

                                                            Benign paroxysmal ve

rtigo, bilateral                        

                                        ICD-9: 386.11

ICD-10: H81.13                   Active                    2018           

 

                                        Unknown                

 

                                              Otalgia, bilateral                

                     ICD-

9: 388.70

ICD-10: H92.03                   Active                    2018           

 

                                        Unknown                

 

                                                            Left lower quadrant 

pain                                    

                                        ICD-9: 789.04

ICD-10: R10.32                   Active                    2017           

 

                                        Unknown                

 

                                              Low back pain                     

                ICD-9: 

724.2

ICD-10: M54.5                   Active                    2017            

 

                                        Unknown                

 

                                                            Radiculopathy, lumbo

sacral region                           

                                        ICD-9: 724.4

ICD-10: M54.17                   Active                    2018           

 

                                        Unknown                

 

                                                            Impaired fasting glu

cose                                    

                                        ICD-9: 790.29

ICD-10: R73.01                   Active                    2012           

 

                                        Unknown                

 

                                              Mixed hyperlipidemia              

                       

ICD-9: 272.4

ICD-10: E78.2                   Active                    2017            

 

                                        Unknown                

 

                                                            Other intervertebral

 disc degeneration, lumbar region       

                                        ICD-9: 722.52

ICD-10: M51.36                   Active                    2017           

 

                                        Unknown                

 

                                              Pain in thoracic spine            

                         

ICD-9: 724.1

ICD-10: M54.6                   Active                    2017            

 

                                        Unknown                

 

                                              Mastodynia                        

             ICD-9: 611.71

ICD-10: N64.4                   Active                    2017            

 

                                        Unknown                

 

                                                            Acute upper respirat

ory infection, unspecified              

                                        ICD-9: 465.9

ICD-10: J06.9                   Active                    2017            

 

                                        Unknown                

 

                                                            Acute mastoiditis wi

thout complications, right ear          

                                        ICD-9: 383.00

ICD-10: H70.001                   Active                    2016          

 

                                        Unknown                

 

                                                            Constipation, unspec

ified                                   

                                        ICD-9: 564.00

ICD-10: K59.00                   Active                    2016           

 

                                        Unknown                

 

                                                            Chronic kidney disea

se, stage 1                             

                                        ICD-9: 585.1

ICD-10: N18.1                   Active                    03/15/2016            

 

                                        Unknown                

 

                                              History of falling                

                     ICD-

9: V15.88

ICD-10: Z91.81                   Active                    12/15/2015           

 

                                        Unknown                

 

                                                            Muscle weakness (gen

eralized)                               

                                        ICD-9: 780.79

ICD-10: M62.81                   Active                    2015           

 

                                        Unknown                

 

                                              Acute renal failure               

                      ICD-

9: 584.9                   Active                    2015                 

 

                                        Unknown                

 

                                              POLYURIA                          

           ICD-9: 788.42  

                    Active                   2015                   Unknow

n   

            

 

                                              CAD                               

      ICD-9: 414.00       

                    Active                   09/10/2015                   Unknow

n        

       

 

                                              Hyperglycemia                     

                ICD-9: 

790.29                   Active                   2012                   

Unknown                

 

                                              HYPERLIPIDEMIA NEC/NOS            

                         

ICD-9: 272.4                   Active                    09/10/2015             

 

                                        Unknown                

 

                                              ABDOMINAL PAIN                    

                 ICD-9: 

789.00                   Active                   10/10/2012                   

Unknown                

 

                                              Grieving                          

           ICD-9: 309.0   

                    Active                   2015                   Unknow

n    

           

 

                                              HYPOGLYCEMIA                      

               ICD-9: 

251.2                   Active                   2012                   

Unknown                

 

                                              MALAISE AND FATIGUE               

                      ICD-

9: 780.79                   Active                    2015                

 

                                        Unknown                

 

                                              CERUMEN IMPACTION                 

                    ICD-9:

380.4                   Active                   2015                   

Unknown                

 

                                              Leg pain                          

           ICD-9: 729.5   

                    Active                   2015                   Unknow

n    

           

 

                                              SUPERFIC PHLEBITIS-LEG            

                         

ICD-9: 451.0                   Active                    2015             

 

                                        Unknown                

 

                                              SPASM OF MUSCLE                   

                  ICD-9: 

728.85                   Active                   2015                   

Unknown                

 

                                              Thoracic back pain                

                     ICD-

9: 724.1                   Active                    2015                 

 

                                        Unknown                

 

                                                            Benign positional ve

rtigo                                   

                    ICD-9: 386.11                   Active                               

                                        Unknown                

 

                                              Suspicious nevus                  

                   ICD-9: 

238.2                   Active                   2015                   

Unknown                

 

                                                            EUSTACHIAN TUBE DYSF

UNCTION                                 

                    ICD-9: 381.81                   Active                             

                                        Unknown                

 

                                              SINUSITIS, ACUTE                  

                   ICD-9: 

461.9                   Active                   2014                   

Unknown                

 

                                              DIZZINESS/VERTIGO                 

                    ICD-9:

780.4                   Active                   2014                   

Unknown                

 

                                              HYPERTENSION                      

               ICD-9: 

401.9                   Active                   2014                   

Unknown                

 

                                              URI, ACUTE                        

             ICD-9: 465.9 

                    Active                   10/15/2013                   Unknow

n  

             

 

                                              CEPHALGIA                         

            ICD-9: 784.0  

                    Active                   2013                   Unknow

n   

            

 

                                              OTITIS MEDIA NOS                  

                   ICD-9: 

382.9                   Active                   2013                   

Unknown                

 

                                              Perforation of ear drum           

                          

ICD-9: 384.20                   Active                    05/10/2013            

 

                                        Unknown                

 

                                              Mastalgia                         

            ICD-9: 611.71 

                    Active                   2013                   Unknow

n  

             

 

                                              DYSPEPSIA                         

            ICD-9: 536.8  

                    Active                   10/10/2012                   Unknow

n   

            

 

                                              IBS                               

      ICD-9: 564.1        

                    Active                   10/10/2012                   Unknow

n         

      

 

                                              FLU VACCINE                       

              ICD-9: 

V04.81                   Active                   2012                   

Unknown                

 

                                              Radiculopathy of leg              

                       

ICD-9: 724.4                   Active                    2012             

 

                                        Unknown                

 

                                              SCIATICA                          

           ICD-9: 724.3   

                    Active                   2012                   Unknow

n    

           

 

                                                            Lumbar degenerative 

disc disease                            

                    ICD-9: 722.52                   Active                        

                                        Unknown                

 

                                              Sacroiliac dysfunction            

                         

ICD-9: 739.4                   Active                    2012             

 

                                        Unknown                

 

                                              Hypertension                      

               Unknown    

                    Active                   2012                   Unknow

n     

          

 

                                              PAIN, LOWER BACK                  

                   ICD-9: 

724.2                   Active                   2011                   

Unknown                

 

                                              SPINAL ENTHESOPATHY               

                      ICD-

9: 720.1                   Active                    2011                 

 

                                        Unknown                

 

                                              Hypotension                       

              ICD-9: 458.9

                    Active                   2011                   Unknow

n 

              

 

                                              SACROILIITIS NEC                  

                   ICD-9: 

720.2                   Active                   2011                   

Unknown                

 

                                              PHARYNGITIS, ACUTE                

                     ICD-

9: 462                   Active                   2010                   

Unknown                

 

                                              URINARY TRACT INFECTION           

                          

ICD-9: 599.0                   Active                    2010             

 

                                        Unknown                

 

                                              Heat exhaustion                   

                  ICD-9: 

992.5                   Active                   2010                   

Unknown                

 

                                              Esophagitis                       

              ICD-9: 

530.10                   Active                   2010                   

Unknown                

 

                                              Gastritis                         

            ICD-9: 535.50 

                    Active                   2010                   Unknow

n  

             

 

                                              GERD                              

       ICD-9: 530.81      

                    Active                   2010                   Unknow

n       

        

 

                                              Hiatal hernia                     

                ICD-9: 

553.3                   Active                   2010                   

Unknown                

 

                                              B12 DEFIC ANEMIA NEC              

                       

ICD-9: 281.1                   Active                    2010             

 

                                        Unknown                

 

                                              Anxiety                           

          Unknown         

                    Active                   2010                   Unknow

n          

     

 

                                              Heart disease                     

                Unknown   

                    Active                   2010                   Unknow

n    

           

 

                                              Hyperlipidemia                    

                 Unknown  

                    Active                   2010                   Unknow

n   

            

 

                                              ANXIETY STATE NOS                 

                    ICD-9:

300.00                   Active                   2010                   

Unknown                

 

                                              DEPRESSIVE DISORDER NEC           

                          

ICD-9: 311                   Active                    2010               

 

                                        Unknown                



                                                                                
                                                                                
                                                                                
                                                                                
                                                                                
                                                                                
                                                                                
                                                                                
                                                                                
                                                                                
                                                                                
                           



Problems

                      



                    Condition                   Codes                   Effectiv

e Dates             

                                        Condition Status                

 

                                                            Acute serous otitis 

media, left ear                         

                                        ICD-9: 381.01

ICD-10: H65.02                   2019                   Active            

   

 

                                                            Coronary atheroscler

osis due to calcified coronary lesion   

                                        ICD-9: 414.00

ICD-10: I25.84                   2018                   Active            

   

 

                                                            Essential (primary) 

hypertension                            

                                        ICD-9: 401.9

ICD-10: I10                   2014                   Active               



 

                                                            Hypoglycemia, unspec

ified                                   

                                        ICD-9: 251.2

ICD-10: E16.2                   2017                   Active             

  

 

                                              Other fatigue                     

                ICD-9: 

780.79

ICD-10: R53.83                   2017                   Active            

   

 

                                                            Urinary tract infect

ion, site not specified                 

                                        ICD-9: 599.0

ICD-10: N39.0                   10/02/2018                   Active             

  

 

                                                            Gastro-esophageal re

flux disease without esophagitis        

                                        ICD-9: 530.81

ICD-10: K21.9                   2018                   Active             

  

 

                                              Epigastric pain                   

                  ICD-9: 

789.06

ICD-10: R10.13                   2018                   Active            

   

 

                                                            Atherosclerotic hear

t disease of native coronary artery 

without angina pectoris                                     ICD-9: 414.00

ICD-10: I25.10                   2018                   Active            

   

 

                                                            Generalized anxiety 

disorder                                

                                        ICD-9: 300.00

ICD-10: F41.1                   2018                   Active             

  

 

                                              Palpitations                      

               ICD-9: 

785.1

ICD-10: R00.2                   10/02/2018                   Active             

  

 

                                              Dizziness and giddiness           

                          

ICD-9: 780.4

ICD-10: R42                   2014                   Active               



 

                                                            Occlusion and stenos

is of bilateral carotid arteries        

                                        ICD-9: 433.10

ICD-10: I65.23                   2018                   Active            

   

 

                                              FLU VACCINE                       

              ICD-9: 

V04.81

ICD-10: Z23                   2012                   Active               



 

                                              Cervicalgia                       

              ICD-9: 723.1

ICD-10: M54.2                   2018                   Active             

  

 

                                                            Other spondylosis wi

th radiculopathy, cervical region       

                                        ICD-9: 721.0

ICD-10: M47.22                   2018                   Active            

   

 

                                              Cervicocranial syndrome           

                          

ICD-9: 723.2

ICD-10: M53.0                   2018                   Active             

  

 

                                                            Vertigo of central o

rigin, bilateral                        

                                        ICD-9: 386.2

ICD-10: H81.43                   2018                   Active            

   

 

                                                            Benign paroxysmal ve

rtigo, bilateral                        

                                        ICD-9: 386.11

ICD-10: H81.13                   2018                   Active            

   

 

                                              Otalgia, bilateral                

                     ICD-

9: 388.70

ICD-10: H92.03                   2018                   Active            

   

 

                                                            Left lower quadrant 

pain                                    

                                        ICD-9: 789.04

ICD-10: R10.32                   2017                   Active            

   

 

                                              Low back pain                     

                ICD-9: 

724.2

ICD-10: M54.5                   2017                   Active             

  

 

                                                            Radiculopathy, lumbo

sacral region                           

                                        ICD-9: 724.4

ICD-10: M54.17                   2018                   Active            

   

 

                                                            Impaired fasting glu

cose                                    

                                        ICD-9: 790.29

ICD-10: R73.01                   2012                   Active            

   

 

                                              Mixed hyperlipidemia              

                       

ICD-9: 272.4

ICD-10: E78.2                   2017                   Active             

  

 

                                                            Other intervertebral

 disc degeneration, lumbar region       

                                        ICD-9: 722.52

ICD-10: M51.36                   2017                   Active            

   

 

                                              Pain in thoracic spine            

                         

ICD-9: 724.1

ICD-10: M54.6                   2017                   Active             

  

 

                                              Mastodynia                        

             ICD-9: 611.71

ICD-10: N64.4                   2017                   Active             

  

 

                                                            Acute upper respirat

ory infection, unspecified              

                                        ICD-9: 465.9

ICD-10: J06.9                   2017                   Active             

  

 

                                                            Acute mastoiditis wi

thout complications, right ear          

                                        ICD-9: 383.00

ICD-10: H70.001                   2016                   Active           

    

 

                                                            Constipation, unspec

ified                                   

                                        ICD-9: 564.00

ICD-10: K59.00                   2016                   Active            

   

 

                                                            Chronic kidney disea

se, stage 1                             

                                        ICD-9: 585.1

ICD-10: N18.1                   03/15/2016                   Active             

  

 

                                              History of falling                

                     ICD-

9: V15.88

ICD-10: Z91.81                   12/15/2015                   Active            

   

 

                                                            Muscle weakness (gen

eralized)                               

                                        ICD-9: 780.79

ICD-10: M62.81                   2015                   Active            

   

 

                                              Acute renal failure               

                      ICD-

9: 584.9                   2015                   Active                

 

                                              POLYURIA                          

           ICD-9: 788.42  

                          2015                   Active                

 

                                              CAD                               

      ICD-9: 414.00       

                          09/10/2015                   Active                

 

                                              Hyperglycemia                     

                ICD-9: 

790.29                    2012                   Active                

 

                                              HYPERLIPIDEMIA NEC/NOS            

                         

ICD-9: 272.4                   09/10/2015                   Active              

 

 

                                              ABDOMINAL PAIN                    

                 ICD-9: 

789.00                    10/10/2012                   Active                

 

                                              Grieving                          

           ICD-9: 309.0   

                          2015                   Active                

 

                                              HYPOGLYCEMIA                      

               ICD-9: 

251.2                     2012                   Active                

 

                                              MALAISE AND FATIGUE               

                      ICD-

9: 780.79                   2015                   Active                

 

                                              CERUMEN IMPACTION                 

                    ICD-9:

380.4                     2015                   Active                

 

                                              Leg pain                          

           ICD-9: 729.5   

                          2015                   Active                

 

                                              SUPERFIC PHLEBITIS-LEG            

                         

ICD-9: 451.0                   2015                   Active              

 

 

                                              SPASM OF MUSCLE                   

                  ICD-9: 

728.85                    2015                   Active                

 

                                              Thoracic back pain                

                     ICD-

9: 724.1                   2015                   Active                

 

                                                            Benign positional ve

rtigo                                   

                    ICD-9: 386.11                   2015                  

 Active            

   

 

                                              Suspicious nevus                  

                   ICD-9: 

238.2                     2015                   Active                

 

                                                            E[employer] DYSF

UNCTION                                 

                    ICD-9: 381.81                   2014                  

 Active          

     

 

                                              SINUSITIS, ACUTE                  

                   ICD-9: 

461.9                     2014                   Active                

 

                                              DIZZINESS/VERTIGO                 

                    ICD-9:

780.4                     2014                   Active                

 

                                              HYPERTENSION                      

               ICD-9: 

401.9                     2014                   Active                

 

                                              URI, ACUTE                        

             ICD-9: 465.9 

                          10/15/2013                   Active                

 

                                              CEPHALGIA                         

            ICD-9: 784.0  

                          2013                   Active                

 

                                              OTITIS MEDIA NOS                  

                   ICD-9: 

382.9                     2013                   Active                

 

                                              Perforation of ear drum           

                          

ICD-9: 384.20                   05/10/2013                   Active             

  

 

                                              Mastalgia                         

            ICD-9: 611.71 

                          2013                   Active                

 

                                              DYSPEPSIA                         

            ICD-9: 536.8  

                          10/10/2012                   Active                

 

                                              IBS                               

      ICD-9: 564.1        

                          10/10/2012                   Active                

 

                                              FLU VACCINE                       

              ICD-9: 

V04.81                    2012                   Active                

 

                                              Radiculopathy of leg              

                       

ICD-9: 724.4                   2012                   Active              

 

 

                                              SCIATICA                          

           ICD-9: 724.3   

                          2012                   Active                

 

                                                            Lumbar degenerative 

disc disease                            

                    ICD-9: 722.52                   2012                  

 Active     

          

 

                                              Sacroiliac dysfunction            

                         

ICD-9: 739.4                   2012                   Active              

 

 

                                              Hypertension                      

               Unknown    

                          2012                   Active                

 

                                              PAIN, LOWER BACK                  

                   ICD-9: 

724.2                     2011                   Active                

 

                                              SPINAL ENTHESOPATHY               

                      ICD-

9: 720.1                   2011                   Active                

 

                                              Hypotension                       

              ICD-9: 458.9

                          2011                   Active                

 

                                              SACROILIITIS NEC                  

                   ICD-9: 

720.2                     2011                   Active                

 

                                              PHARYNGITIS, ACUTE                

                     ICD-

9: 462                    2010                   Active                

 

                                              URINARY TRACT INFECTION           

                          

ICD-9: 599.0                   2010                   Active              

 

 

                                              Heat exhaustion                   

                  ICD-9: 

992.5                     2010                   Active                

 

                                              Esophagitis                       

              ICD-9: 

530.10                    2010                   Active                

 

                                              Gastritis                         

            ICD-9: 535.50 

                          2010                   Active                

 

                                              GERD                              

       ICD-9: 530.81      

                          2010                   Active                

 

                                              Hiatal hernia                     

                ICD-9: 

553.3                     2010                   Active                

 

                                              B12 DEFIC ANEMIA NEC              

                       

ICD-9: 281.1                   2010                   Active              

 

 

                                              Anxiety                           

          Unknown         

                          2010                   Active                

 

                                              Heart disease                     

                Unknown   

                          2010                   Active                

 

                                              Hyperlipidemia                    

                 Unknown  

                          2010                   Active                

 

                                              ANXIETY STATE NOS                 

                    ICD-9:

300.00                    2010                   Active                

 

                                              DEPRESSIVE DISORDER NEC           

                          

ICD-9: 311                   2010                   Active                



                                                                                
                                                                                
                                                                                
                                                                                
                                                                                
                                                                                
                                                                                
                                                                                
                                                                                
                                                                                
                                                                                
                           



Medications

                      



                    Medication                   Codes                   Instruc

tions               

                    Start Date                   Stop Date                   Sta

tus              

                                        Fill Instructions                

 

                                                            omeprazole 40 mg cap

alicia,delayed release                    

                          RxNorm: 607047                   1 Capsule(s) PO QD   

          

                    2019                   10/08/2019                   Ac

tive           

                                                        

 

                                                            Flonase Allergy Reli

ef 50 mcg/actuation nasal 

spray,suspension                                     RxNorm: 9723117            

                    2 [place] [facility]                   2019              

     No Stop 

Date                      Active                                    

 

                                                            simvastatin 40 mg ta

blet                                    

                    RxNorm: 708003                   1 Tablet(s) PO QD          

         2019                   Active                         

   

      

 

                                                            simvastatin 40 mg ta

blet                                    

                    RxNorm: 431410                   1 Tablet(s) PO QD          

         2019                   Inactive                       

   

        

 

                                                            omeprazole 40 mg cap

alicia,delayed release                    

                          RxNorm: 662851                   1 Capsule(s) PO QD   

          

                    2019                   In

active         

                                                        

 

                                                            simvastatin 40 mg ta

blet                                    

                    RxNorm: 907370                   1 Tablet(s) PO QD          

         2019                   Inactive                       

   

        

 

                                                            omeprazole 40 mg cap

alicia,delayed release                    

                          RxNorm: 070175                   1 Capsule(s) PO QD   

          

                    2019                   In

active         

                                                        

 

                                                            Bactrim  mg-16

0 mg tablet                             

                    RxNorm: 886787                   1 Tablet(s) PO BID         

          

2019                   Inactive              

                                                        

 

                                                            Bactrim  mg-16

0 mg tablet                             

                    RxNorm: 862279                   1 Tablet(s) PO BID         

          

2019                   Inactive              

                                                        

 

                                                            metoprolol tartrate 

25 mg tablet                            

                    RxNorm: 692369                   1 Tablet(s) PO BID         

          

2019                   Inactive              

                                                        

 

                                              Macrobid 100 mg capsule           

                          

RxNorm: 636061                   1 Capsule(s) PO BID                   

2019                   Inactive              

                                                        

 

                                              Xanax 0.25 mg tablet              

                       

RxNorm: 501416                          TAKE 1 TABLET BY MOUTH TWICE DAILY      

       

                    2018                   No Stop Date                   

Active         

                                                        

 

                                              Xanax 0.25 mg tablet              

                       

RxNorm: 074072                   1 Tablet(s) PO BID                   2018                   Inactive                       

  

         

 

                                                            Protonix 40 mg table

t,delayed release                       

                          RxNorm: 390962                   1 Tablet(s) PO BID fo

r 

stomach--replaces omeprazole                   2018                   Inactive                                   

 

                                              Carafate 1 gram tablet            

                         

RxNorm: 517528                   1 Tablet(s) PO AC & HS                   

2018                   Inactive              

                                                        

 

                                              Xanax 0.25 mg tablet              

                       

RxNorm: 186923                   1 Tablet(s) PO BID                   10/30/2018

                    12/10/2018                   Inactive                       

  

         

 

                                                            Bactrim  mg-16

0 mg tablet                             

                    RxNorm: 765429                   1 Tablet(s) PO BID         

          

10/04/2018                   10/08/2018                   Inactive              

                                                        

 

                                                            Bactrim  mg-16

0 mg tablet                             

                    RxNorm: 138887                   1 Tablet(s) PO BID         

          

10/04/2018                   10/03/2018                   Inactive              

                                                        

 

                                              Carafate 1 gram tablet            

                         

RxNorm: 839790                   1 Tablet(s) PO AC & HS                   

2018                   10/17/2018                   Inactive              

                                                        

 

                                                            Protonix 40 mg table

t,delayed release                       

                          RxNorm: 385943                   1 Tablet(s) PO BID fo

r 

stomach--replaces omeprazole                   2018                   Inactive                                   

 

                                                            Protonix 40 mg table

t,delayed release                       

                          RxNorm: 720650                   1 Tablet(s) PO BID fo

r 

stomach--replaces omeprazole                   2018                   Inactive                                   

 

                                                            fluticasone 50 mcg/a

ctuation nasal spray,suspension         

                           RxNorm: 6742900                   2 Spray NASAL QD to

each nostril                   2018          

                          Inactive                                   

 

                                                            Nasonex 50 mcg/actua

tion Spray                              

                    RxNorm: 6438317                   2 Spray NASAL             

      

2018                   Inactive              

                                                        

 

                                                            omeprazole 40 mg cap

alicia,delayed release                    

                          RxNorm: 062026                   1 Capsule(s) PO QD   

          

                    2018                   08/15/2018                   In

active         

                                                        

 

                                                            simvastatin 40 mg ta

blet                                    

                          RxNorm: 542236                   1 Tablet(s) PO QD ROBLES

E 1 TABLET EVERY DAY      

                    2018                   In

active  

                                                        

 

                                                            metoprolol tartrate 

25 mg tablet                            

                    RxNorm: 027293                   1/2 Tablet(s) PO QD        

           

2018                   Inactive              

                                                        

 

                                                            simvastatin 40 mg ta

blet                                    

                          RxNorm: 624411                   Tablet(s) TAKE 1 TABL

ET EVERY DAY              

                    2017                   In

active          

                                                        

 

                                                            metoprolol tartrate 

25 mg tablet                            

                    RxNorm: 647139                   1/2 Tablet(s) PO QD        

           

2017                   Inactive              

                                                        

 

                                                            Flonase 50 mcg/actua

tion nasal spray,suspension             

                          RxNorm: 8114530                   2 Spray NASAL BID   

   

                    2017                   In

active  

                                                        

 

                                                            omeprazole 40 mg cap

alicia,delayed release                    

                          RxNorm: 544700                   1 Capsule(s) PO QD   

          

                    2017                   In

active         

                                                        

 

                                                            metoprolol tartrate 

25 mg tablet                            

                    RxNorm: 985802                   1/2 Tablet(s) PO QD        

           

04/10/2017                   2017                   Inactive              

                                                        

 

                                                            ciprofloxacin 0.2 % 

ear drops in a dropperette              

                          RxNorm: 178640                   4 Drop(s) OTIC TID fo

r 1 

week                      2016               

   

                          Inactive                                   

 

                                              cefdinir 300 mg capsule           

                          

RxNorm: 219089                   2 Capsule(s) PO QD                   2016                   Inactive                       

  

         

 

                                                            omeprazole 40 mg cap

alicia,delayed release                    

                          RxNorm: 201310                   TAKE 1 CAPSULE EVERY 

DAY       

                    2016                   In

active   

                                                        

 

                                                            simvastatin 40 mg ta

blet                                    

                    RxNorm: 902225                   TAKE 1 TABLET EVERY DAY    

               

2016                   Inactive              

                                                        

 

                                                            Flonase 50 mcg/actua

tion nasal spray,suspension             

                          RxNorm: 9139940                   2 Spray NASAL BID   

   

                    2015                   In

active  

                                                        

 

                                                            cefuroxime axetil 25

0 mg tablet                             

                    RxNorm: 648234                   1 Tablet(s) PO BID         

          

2015                   Inactive              

                                                        

 

                                                            cefuroxime axetil 25

0 mg tablet                             

                    RxNorm: 918564                   1 Tablet(s) PO BID         

          

2015                   Inactive              

                                                        

 

                                                            omeprazole 40 mg cap

alicia,delayed release                    

                          RxNorm: 281867                   1 Capsule(s) PO QD   

          

                    2015                   In

active         

                                                        

 

                                              meloxicam 7.5 mg tablet           

                          

RxNorm: 941795                   1 Tablet(s) PO QD                   2015                   Inactive                       

   

        

 

                                              loratadine 10 mg tablet           

                          

RxNorm: 653075                          1 Tablet(s) PO QAM for allergies        

       

                    2014                   In

active           

                                                        

 

                                                            Flonase 50 mcg/actua

tion nasal spray,suspension             

                          RxNorm: 151023                   2 Spray NASAL BID    

   

                    2014                   12/15/2015                   In

active   

                                                        

 

                                              prednisone 20 mg tablet           

                          

RxNorm: 915460                   2 Tablet(s) PO BID                   2014                   Inactive                       

  

         

 

                                                            Dyazide 37.5 mg-25 m

g capsule                               

                    RxNorm: 991604                   1 Capsule(s) PO QAM        

           

2014                   Inactive              

                                                        

 

                                              Ceftin 500 mg tablet              

                       

RxNorm: 656739                   1 Tablet(s) PO BID                   2014                   Inactive                       

  

         

 

                                              Ceftin 500 mg tablet              

                       

RxNorm: 987638                   1 Tablet(s) PO BID                   2014                   Inactive                       

  

         

 

                                                            simvastatin 40 mg ta

blet                                    

                          RxNorm: 939934                   Tablet(s) PO TAKE ONE

 TABLET BY MOUTH EVERY DAY

                    2014                   

Inactive                                                

 

                                                            metoprolol tartrate 

25 mg tablet                            

                          RxNorm: 653499                   Tablet(s) PO TAKE ONE

-HALF TABLET BY 

MOUTH EVERY DAY                   2014       

                          Inactive                                   

 

                                              Ceftin 500 mg tablet              

                       

RxNorm: 769405                   1 Tablet(s) PO BID                   10/15/2013

                    10/24/2013                   Inactive                       

  

         

 

                                              loratadine 10 mg tablet           

                          

RxNorm: 007335                          1 Tablet(s) PO QAM for allergies        

       

                    2013                   In

active           

                                                        

 

                                                            omeprazole 20 mg cap

alicia,delayed release                    

                          RxNorm: 408758                   Capsule(s) PO TAKE ON

E CAPSULE 

BY MOUTH TWICE DAILY                   2013  

                          Inactive                                   

 

                                                            omeprazole 20 mg cap

alicia,delayed release                    

                          RxNorm: 278742                   1 Capsule(s) PO BID  

          

                    2013                   In

active        

                                                        

 

                                                            Augmentin 875 mg-125

 mg tablet                              

                    RxNorm: 804132                   1 Tablet(s) PO Q12H        

           

05/10/2013                   2013                   Inactive              

                                                        

 

                                                            Floxin Otic Drops 1 

bottle Drops                            

                    RxNorm:                    5 Drop(s) OTIC BID               

    

05/10/2013                   2013                   Inactive              

                                                        

 

                                                            metoprolol tartrate 

25 mg tablet                            

                          RxNorm: 385588                   Tablet(s) PO TAKE ONE

-HALF TABLET BY 

MOUTH EVERY DAY                   2013       

                          Inactive                                   

 

                                                            simvastatin 40 mg ta

blet                                    

                          RxNorm: 724952                   Tablet(s) PO TAKE ONE

 TABLET BY MOUTH EVERY DAY

                    2013                   

Inactive                                                

 

                                              lancets                           

          RxNorm:         

                                        Misc Miscellaneous USE ONE TO CHECK GLUC

OSE EVERY DAY                 

                    2013                   In

active             

                                                        

 

                                              Carafate 1 gram tablet            

                         

RxNorm: 304432                   1 Tablet(s) PO AC & HS                   

2012                   Inactive              

                                                        

 

                                              Flagyl 500 mg tablet              

                       

RxNorm: 270674                   1 Tablet(s) PO TID                   10/10/2012

                    10/16/2012                   Inactive                       

  

         

 

                                              Carafate 1 gram tablet            

                         

RxNorm: 540908                   1 Tablet(s) PO AC & HS                   

10/10/2012                   2012                   Inactive              

                                                        

 

                                              One Touch Test strips             

                        

RxNorm:                    Miscellaneous QD                   2012                   Inactive                   one 

touch 

ultra mini test strips                

 

                                              Protonix 40 mg Tab                

                     

RxNorm: 129677                   1 Tablet(s) PO QD                   2012                   Inactive                       

   

        

 

                                              Protonix 40 mg Tab                

                     

RxNorm: 194972                   1 Tablet(s) PO QD                   2012 

                    10/09/2012                   Inactive                       

   

        

 

                                              prednisone 20 mg Tab              

                       

RxNorm: 354517                   1 Tablet(s) PO BID                   2012                   Inactive                       

  

         

 

                                              Flexeril 5 mg Tab                 

                    

RxNorm: 295587                   1 Tablet(s) PO QHS for spasm                   

2012                   Inactive              

                                                        

 

                                              Flexeril 5 mg Tab                 

                    

RxNorm: 734856                   1 Tablet(s) PO QHS for spasm                   

2012                   Inactive              

                                                        

 

                                              amlodipine 2.5 mg Tab             

                        

RxNorm: 537906                          1/2 Tablet(s) PO QD replaces 5mg dose   

       

                    2012                   In

active      

                                                        

 

                                                            simvastatin 40 mg ta

blet                                    

                    RxNorm: 482924                   1 Tablet(s) PO QD          

         2012                   Inactive                       

   

        

 

                                                            metoprolol tartrate 

25 mg tablet                            

                    RxNorm: 361433                   1/2 Tablet(s) PO QD        

           

2012                   Inactive              

                                                        

 

                                                            omeprazole 20 mg cap

alicia,delayed release                    

                          RxNorm: 239689                   1 Capsule(s) PO BID  

          

                    2012                   In

active        

                                                        

 

                                              cefdinir 300 mg Cap               

                      

RxNorm: 135201                   2 Capsule(s) PO QD                   2011                   Inactive                       

  

         

 

                                              simvastatin 40 mg Tab             

                        

RxNorm: 147069                   1 Tablet(s) PO QD                   2011                   Inactive                       

   

        

 

                                                            metoprolol tartrate 

25 mg Tab                               

                    RxNorm: 250903                   1/2 Tablet(s) PO QD        

           

10/25/2011                   2012                   Inactive              

                                                        

 

                                                            metoprolol tartrate 

25 mg Tab                               

                    RxNorm: 937290                   1/2 Tablet(s) PO QD        

           

10/24/2011                   10/24/2011                   Inactive              

                                                        

 

                                              meclizine 25 mg Tab               

                      

RxNorm: 3842658                   1 Tablet(s) PO QHS                   

2011                   Inactive              

                                        for dizziness                

 

                                              Ceftin 500 mg Tab                 

                    

RxNorm: 897208                   1 Tablet(s) PO BID                   2011                   Inactive                       

  

         

 

                                                            omeprazole 20 mg Cap

, Delayed Release                       

                          RxNorm: 065666                   1 Capsule(s) PO BID  

             

                    2011                   10/24/2011                   In

active           

                                                        

 

                                              simvastatin 40 mg Tab             

                        

RxNorm: 251583                   1 Tablet(s) PO QD                   2011                   Inactive                       

   

        

 

                                              simvastatin 40 mg Tab             

                        

RxNorm: 820728                   1 Tablet(s) PO QD                   2011                   Inactive                       

   

        

 

                                              simvastatin 40 mg Tab             

                        

RxNorm: 684054                   1 Tablet(s) PO QD                   2010                   Inactive                       

   

        

 

                                                            Tessalon Perles 100 

mg Cap                                  

                    RxNorm: 816990                   1 Capsule(s) PO Q6-8H      

             

2010                   Inactive              

                                                        

 

                                              cefdinir 300 mg Cap               

                      

RxNorm: 690065                   1 Capsule(s) PO BID                   

2010                   Inactive              

                                                        

 

                                              Lexapro 10 mg Tab                 

                    

RxNorm: 241382                   1 Tablet(s) PO QD                   2010                   Inactive                       

   

        

 

                                              Cipro 250 mg Tab                  

                   RxNorm:

045690                    1 Tablet(s) PO BID                   2010       

 

                    10/04/2010                   Inactive                       

          

 

 

                                                            Wellbutrin  mg

 24 hr Tab                              

                    RxNorm: 042717                   1 Tablet(s) PO QAM         

          

2010                   Inactive              

                                                        

 

                                              Fish Oil 1,000 mg Cap             

                        

RxNorm:                    1 Capsule(s) PO QD                   No Start Date   

                                        Active                                  

 

 

                                                            Tylenol Extra Streng

th 500 mg tablet                        

                          RxNorm: 599250                   1/2 Tablet(s) PO as n

eeded         

                    No Start Date                                       Active  

           

                                                        

 

                                                            nitroglycerin 0.4 mg

 sublingual tablet                      

                          RxNorm: 293767                   Tablet(s) SL as neede

d           

                    No Start Date                                       Active  

             

                                                        

 

                                                            Calcium with Vitamin

 D 600 mg (1,500 mg)-400 unit tablet    

                                RxNorm: 444198                   1 Tablet(s) PO 

QD                   No Start Date                                       Active 

                                                        

 

                                                            Multivitamin & [place]

al Formula Tab                          

                    RxNorm:                    1 Tablet(s) PO QD                

   No 

Start Date                                       Active                         

         

 

                                                            vitamin B complex ca

psule                                   

                    RxNorm:                    1 Capsule(s) PO QD               

    No Start Date  

                                        Active                                  

 

 

                                              Aspirin 81 mg Tab                 

                    

RxNorm: 816502                   1 Tablet(s) PO QD                   No Start 

Date                                       Active                               

   

 

                                                            isosorbide mononitra

te ER 30 mg tablet,extended release 24 

hr                                     RxNorm: 382794                   1 

Tablet(s) PO QHS                   No Start Date                                

                          Active                                    

 

                                                            Vitamin D 1,000 unit

 Cap                                    

                    RxNorm: 500086                   1 Capsule(s) PO QD         

          No Start 

Date                                       Active                               

   

 

                                              Co Q-10 oral                      

               RxNorm: 

43786                   oral                   No Start Date                    

                          Active                                    

 

                                                            metoprolol tartrate 

25 mg Tab                               

                    RxNorm: 603342                   1/2 Tablet(s) PO QD        

           No 

Start Date                   10/23/2011                   Inactive              

                                                        

 

                                                            Nasonex 50 mcg/actua

tion Spray                              

                    RxNorm: 3460070                   2 Spray NASAL             

      No Start

Date                   2018                   Inactive                    

              

 

                                                            Vitamin B12 1000mcg 

Tablet                                  

                    RxNorm:                    1 Tablet(s) PO QD                

   No Start Date  

                    2015                   Inactive                       

    

       

 

                                              amlodipine 5 mg Tab               

                      

RxNorm: 699204                   1 Tablet(s) PO QD                   No Start 

Date                   2012                   Inactive                    

              

 

                                                            simvastatin 40 mg ta

blet                                    

                    RxNorm: 165871                   1 Tablet(s) PO QD          

         No Start 

Date                   2019                   Inactive                    

              

 

                                                            omeprazole 20 mg cap

alicia,delayed release                    

                          RxNorm: 317229                   1 Capsule(s) PO BID  

          

                    No Start Date                   2013                  

 Inactive     

                                                        

 

                                                            omeprazole 40 mg cap

alicia,delayed release                    

                          RxNorm: 820931                   1 Capsule(s) PO QD   

          

                    No Start Date                   2019                  

 Inactive      

                                                        

 

                                              Nexium 40 mg Cap                  

                   RxNorm:

331005                    1 Capsule(s) PO QD                   No Start Date    

 

                    2010                   Inactive                       

       

    

 

                                                            Stool Softener 100 m

g Tab                                   

                    RxNorm: 2707729                   2 Tablet(s) PO BID        

           No Start

Date                   2012                   Inactive                    

              

 

                                                            Calcium with Vitamin

 D 600 mg (1,500 mg)-400 unit Tab       

                             RxNorm: 037392                   1 Tablet(s) PO QD 

                    No Start Date                   2015                  

 

Inactive                                                

 

                                                            iron 325 mg (65 mg i

naun) Tab                                

                    RxNorm: 242463                   1 Tablet(s) PO QD          

         No 

Start Date                   2015                   Inactive              

                                                        

 

                                                            Flonase 50 mcg/actua

tion Nasal Spray                        

                          RxNorm: 413748                   2 Spray NASAL BID    

              

                    No Start Date                   2014                  

 Inactive           

                                                        

 

                                                            fluticasone 50 mcg/a

ctuation nasal spray,suspension         

                           RxNorm: 5193756                   2 Spray NASAL QD to

each nostril                   No Start Date                   2018       

                          Inactive                                   

 

                                                            Nasonex 50 mcg/actua

tion Spray                              

                    RxNorm: 0523475                   2 Spray NASAL QD          

         No 

Start Date                   2018                   Inactive              

                                                        

 

                                                            hydralazine 50 mg ta

blet                                    

                          RxNorm: 064892                   1 Tablet(s) PO as nee

ded for BP over 160/90    

                    No Start Date                   2018                  

 

Inactive                                                

 

                                                            Vimovo 500 mg-20 mg 

12 hr Tab                               

                    RxNorm: 254954                   1 Tablet(s) PO BID         

          No 

Start Date                   2011                   Inactive              

                                                        

 

                                                            Tylenol PM 25 mg-500

 mg/15 mL Oral Soln                     

                    RxNorm: 3123806                   1 PO QPM                  

 No 

Start Date                   2015                   Inactive              

                                                        

 

                                                            Iron (Ferrous Sulfat

e) Oral                                 

                    RxNorm:                    Oral                   No Start D

ate              

                    2012                   Inactive                       

            

 

                                              Reglan 10 mg Tab                  

                   RxNorm:

500456                    1 Tablet(s) PO TID before meals                   No 

Start Date                   2011                   Inactive              

                                                        

 

                                              Xanax 1 mg Tab                    

                 RxNorm: 

213997                    1/2 Tablet(s) PO QD                   No Start Date   

 

                    2018                   Inactive                       

      

     

 

                                              amlodipine 10 mg tablet           

                          

RxNorm: 376456                   1 Tablet(s) PO QD                   No Start 

Date                   2014                   Inactive                    

              

 

                                              Iron (dried) Oral                 

                    

RxNorm:                    Oral                      No Start Date              

   

                    2012                   Inactive                       

            

 

                                                            metoprolol tartrate 

50 mg tablet                            

                    RxNorm: 635316                   1 Tablet(s) PO BID         

          No

Start Date                   12/10/2018                   Inactive              

                                                        

 

                                              Xanax 0.25 mg tablet              

                       

RxNorm: 676772                   1 Tablet(s) PO BID                   No Start 

Date                   10/29/2018                   Inactive                    

              

 

                                              lancets                           

          RxNorm:         

                          Miscellaneous check blood sugar at least once daily   

                

No Start Date                   2013                   Inactive           

                                                        

 

                                              simvastatin 40 mg Tab             

                        

RxNorm: 371375                   1 Tablet(s) PO QD                   No Start 

Date                   2010                   Inactive                    

              

 

                                                            isosorbide mononitra

te ER 30 mg tablet,extended release 24 

hr                                     RxNorm: 285156                   1 

Tablet(s) PO QHS                   No Start Date                   2019   

                          Inactive                                   

 

                                                            amlodipine 2.5 mg ta

blet                                    

                    RxNorm: 229756                   1 Tablet(s) PO QD          

         No Start 

Date                   2014                   Inactive                    

              

 

                                                            sucralfate 1 gram ta

blet                                    

                    RxNorm: 775554                   1 Tablet(s) PO QID         

          No Start 

Date                   2019                   Inactive                    

              

 

                                              Fish Oil Oral                     

                RxNorm:   

                    Oral                   No Start Date                   

2012                   Inactive                                   

 

                                              Multiple Vitamin Oral             

                        

RxNorm:                    Oral                      No Start Date              

   

                    2012                   Inactive                       

            

 

                                                            metoprolol tartrate 

25 mg tablet                            

                    RxNorm: 630304                   1/2 Tablet(s) PO QD        

           

No Start Date                   2017                   Inactive           

                                                        

 

                                                            metoprolol tartrate 

50 mg tablet                            

                    RxNorm: 168836                   1/2 Tablet(s) PO BID       

            

No Start Date                   2019                   Inactive           

                                                        

 

                                                            Flonase Allergy Reli

ef 50 mcg/actuation nasal 

spray,suspension                                     RxNorm: 9251940            

                    2 [place] NASAL QHS                   No Start Date           

        

2019                   Inactive                                   



                                                                                
                                                                                
                                                                                
                                                                                
                                                                                
                                                                                
                                                                                
                                                                                
                                                                                
                                                                                
                                                                                
                                                                                
                                                                                
                                                                                
                                                                                
                                                                                
                                                              



Medication Administered

          No Medication Administered data                                       
                            



Immunizations

                      



                Vaccine                   Codes                   Date          

         Status 

              

 

                    Influenza                   CVX: 135                                  

                                        completed                

 

                    Influenza                   CVX: 135                   10/06

/2017               

                                        completed                

 

                    Influenza                   CVX: 135                   10/07

/2016               

                                        completed                

 

                    Influenza                   CVX: 135                   10/16

/2015               

                                        completed                

 

                    Influenza                   CVX: 141                                  

                                        completed                

 

                    Influenza (Adult)                   CVX: 141                

   10/06/2010       

                                        completed                



                                                                                
                                               



Assessments

                      



                    Condition                   Codes                   Effectiv

e Dates             

  

 

                          Acute serous otitis media, left ear                   

ICD-10: H65.02

ICD-9: 381.01                           2019                

 

                          Urinary tract infection, site not specified           

        ICD-10: N39.0

ICD-9: 599.0                            06/10/2019                

 

                          Other fatigue                   ICD-10: R53.83

ICD-9: 780.79                           06/10/2019                

 

                          Hypoglycemia, unspecified                   ICD-10: E1

6.2

ICD-9: 251.2                            06/10/2019                

 

                          Coronary atherosclerosis due to calcified coronary les

ion                   ICD-

10: I25.84

ICD-9: 414.00                           06/10/2019                

 

                          Essential (primary) hypertension                   ICD

-10: I10

ICD-9: 401.9                            06/10/2019                

 

                          Gastro-esophageal reflux disease without esophagitis  

                 ICD-10: 

K21.9

ICD-9: 530.81                           2019                

 

                          Epigastric pain                   ICD-10: R10.13

ICD-9: 789.06                           2018                

 

                          Generalized anxiety disorder                   ICD-10:

 F41.1

ICD-9: 300.00                           2018                

 

                                        Atherosclerotic heart disease of native 

coronary artery without angina pectoris 

                                        ICD-10: I25.10

ICD-9: 414.00                           2018                

 

                          Palpitations                   ICD-10: R00.2

ICD-9: 785.1                            10/02/2018                

 

                          Occlusion and stenosis of bilateral carotid arteries  

                 ICD-10: 

I65.23

ICD-9: 433.10                           2018                

 

                          Dizziness and giddiness                   ICD-10: R42

ICD-9: 780.4                            2018                

 

                          FLU VACCINE                   ICD-10: Z23

ICD-9: V04.81                           2018                

 

                          Cervicalgia                   ICD-10: M54.2

ICD-9: 723.1                            2018                

 

                          Other spondylosis with radiculopathy, cervical region 

                  ICD-10: 

M47.22

ICD-9: 721.0                            2018                

 

                          Cervicocranial syndrome                   ICD-10: M53.

0

ICD-9: 723.2                            2018                

 

                          Vertigo of central origin, bilateral                  

 ICD-10: H81.43

ICD-9: 386.2                            2018                

 

                          Otalgia, bilateral                   ICD-10: H92.03

ICD-9: 388.70                           2018                

 

                          Benign paroxysmal vertigo, bilateral                  

 ICD-10: H81.13

ICD-9: 386.11                           2018                

 

                          Radiculopathy, lumbosacral region                   IC

D-10: M54.17

ICD-9: 724.4                            2018                

 

                          Low back pain                   ICD-10: M54.5

ICD-9: 724.2                            2018                

 

                          Left lower quadrant pain                   ICD-10: R10

.32

ICD-9: 789.04                           2018                

 

                          Impaired fasting glucose                   ICD-10: R73

.01

ICD-9: 790.29                           2017                

 

                          Mixed hyperlipidemia                   ICD-10: E78.2

ICD-9: 272.4                            2017                

 

                          Other intervertebral disc degeneration, lumbar region 

                  ICD-10: 

M51.36

ICD-9: 722.52                           2017                

 

                          Pain in thoracic spine                   ICD-10: M54.6

ICD-9: 724.1                            2017                

 

                          Mastodynia                   ICD-10: N64.4

ICD-9: 611.71                           2017                

 

                          Acute upper respiratory infection, unspecified        

           ICD-10: J06.9

ICD-9: 465.9                            2017                

 

                          Acute mastoiditis without complications, right ear    

               ICD-10: 

H70.001

ICD-9: 383.00                           2016                

 

                          Constipation, unspecified                   ICD-10: K5

9.00

ICD-9: 564.00                           2016                

 

                          Chronic kidney disease, stage 1                   ICD-

10: N18.1

ICD-9: 585.1                            2016                

 

                          Muscle weakness (generalized)                   ICD-10

: M62.81

ICD-9: 780.79                           2015                

 

                          History of falling                   ICD-10: Z91.81

ICD-9: V15.88                           2015                

 

                    POLYURIA                   ICD-9: 788.42                   0

9/15/2015           

    

 

                    Acute renal failure                   ICD-9: 584.9          

         09/15/2015 

              

 

                    HYPERTENSION                   ICD-9: 401.9                 

  09/10/2015        

       

 

                    Hyperglycemia                   ICD-9: 790.29               

    09/10/2015      

         

 

                    CAD                   ICD-9: 414.00                   09/10/

2015                

 

                    HYPERLIPIDEMIA NEC/NOS                   ICD-9: 272.4       

            

09/10/2015                

 

                    MALAISE AND FATIGUE                   ICD-9: 780.79         

          09/10/2015

               

 

                    HYPOGLYCEMIA                   ICD-9: 251.2                 

  2015        

       

 

                    Grieving                   ICD-9: 309.0                               

   

 

                    ABDOMINAL PAIN                   ICD-9: 789.00              

     2015     

          

 

                    CERUMEN IMPACTION                   ICD-9: 380.4            

       2015   

            

 

                    EUSTACHIAN TUBE DYSFUNCTION                   ICD-9: 381.81 

                  

2015                

 

                    SUPERFIC PHLEBITIS-LEG                   ICD-9: 451.0       

            

2015                

 

                    Leg pain                   ICD-9: 729.5                               

   

 

                    Thoracic back pain                   ICD-9: 724.1           

        2015  

             

 

                    SPASM OF MUSCLE                   ICD-9: 728.85             

      2015    

           

 

                    DIZZINESS/VERTIGO                   ICD-9: 780.4            

       2015   

            

 

                    Suspicious nevus                   ICD-9: 238.2             

      2015    

           

 

                    Benign positional vertigo                   ICD-9: 386.11   

                

2015                

 

                    SINUSITIS, ACUTE                   ICD-9: 461.9             

      2014    

           

 

                    B12 DEFIC ANEMIA NEC                   ICD-9: 281.1         

          2014

               

 

                    COUGH                   ICD-9: 786.2                                  



 

                    URI, ACUTE                   ICD-9: 465.9                   

10/15/2013          

     

 

                    ANXIETY STATE NOS                   ICD-9: 300.00           

        2013  

             

 

                    FLU VACCINE                   ICD-9: V04.81                 

  2013        

       

 

                    CEPHALGIA                   ICD-9: 784.0                   0

2013           

    

 

                    OTITIS MEDIA NOS                   ICD-9: 382.9             

      2013    

           

 

                    Perforation of ear drum                   ICD-9: 384.20     

              

05/10/2013                

 

                    Mastalgia                   ICD-9: 611.71                   

2013          

     

 

                    GERD                   ICD-9: 530.81                                  



 

                    DYSPEPSIA                   ICD-9: 536.8                   1

           

    

 

                    IBS                   ICD-9: 564.1                   10/10/2

012                

 

                    URINARY TRACT INFECTION                   ICD-9: 599.0      

             

2012                

 

                    SPINAL ENTHESOPATHY                   ICD-9: 720.1          

         2012 

              

 

                    SACROILIITIS NEC                   ICD-9: 720.2             

      2012    

           

 

                    Radiculopathy of leg                   ICD-9: 724.4         

          2012

               

 

                    LUMB/LUMBOSAC DISC DEGEN                   ICD-9: 722.52    

               

2012                

 

                    SCIATICA                   ICD-9: 724.3                               

   

 

                    PAIN, LOWER BACK                   ICD-9: 724.2             

      2012    

           

 

                    Sacroiliac dysfunction                   ICD-9: 739.4       

            

2012                

 

                    HYPOTENSION                   ICD-9: 458.9                  

 2012         

      

 

                    PHARYNGITIS, ACUTE                   ICD-9: 462             

      2011    

           

 

                    DEPRESSIVE DISORDER NEC                   ICD-9: 311        

           

2011                

 

                    Heat exhaustion                   ICD-9: 992.5              

     2010     

          

 

                    DIAPHRAGMATIC HERNIA                   ICD-9: 553.3         

          2010

               

 

                    Gastritis                   ICD-9: 535.50                   

2010          

     

 

                    Esophagitis                   ICD-9: 530.10                 

  2010        

       



                                                                    



Reason For Visit

                      



                    Reason For Visit                   Effective Dates          

         Notes      

         

 

                    hearing loss                   2019                   

left ear            

   

 

                    follow up                   06/10/2019                      

             

 

                    follow up                   2019                   Jelena inman has upcoming 

appointment with Dr Claire and carotid dopplers tomorrow                

 

                    follow up                   2018                   Jelena inman had heart cath 

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                

 

                    follow up                   2018                   [address]

[facility] [place]                

 

                    follow up                   10/02/2018                      

             

 

                    follow up                   2018                      

             

 

                    high blood pressure                   2018            

       Dr Claire has 

ordered holter monitor and hydralazine 50mg PRN                

 

                    dizziness                   2018                   On 

 morning 

patient woke up and went to the bathroom and after lying down became very dizzy.
Patient has hx of vertigo so didn't think anything of it. She usually just has 
them intermittently, but had more that day. While at [employer] began to feel very 
ill and became very shaky. She got home and sat in the recliner and had the 
jittery/nervous feeling. Later that night it finally resolved. Patient feels 
like she had a spell like this around the  and thought it was due to 
extreme heat exhaustion.                

 

                    follow up                   2018                      

             

 

                    back pain                   2018                      

             

 

                    otalgia                   2018                        

           

 

                    back pain                   2018                      

             

 

                    injection(s)                   10/06/2017                   

flu shot            

   

 

                    lab draw                   2017                       

            

 

                    follow up                   2017                      

             

 

                    pelvic pain                   2017                   P

atient states pain 

started in May with a "Constant Ache" to left inguinal area. Patient states at 
that time pain was intermittent. Then, approximately 2nd week  of  pain 
worsened and radiates to left low back area.                

 

                    lab draw                   2017                       

            

 

                    hyperglycemia                   2017                  

                 

 

                    cough                   2017                          

         

 

                    otalgia                   2016                        

           

 

                    injection(s)                   10/07/2016                   

Influenza           

    

 

                    lab draw                   2016                       

            

 

                    constipation                   2016                   

                

 

                    flank pain                   2016                     

              

 

                    follow up                   2015                   3mo

 fwup               



 

                    injection(s)                   10/16/2015                   

Influenza           

    

 

                    acute renal failure                   09/15/2015            

                    

  

 

                    lab draw                   09/10/2015                       

            

 

                    fatigue                   2015                        

           

 

                    otalgia                   2015                        

           

 

                    pain, limb                   2015                     

              

 

                    follow up                   2015                   Hos

pital fwup          

     

 

                    high blood pressure                   2015            

                    

  

 

                    injection(s)                   10/17/2014                   

flu shot            

   

 

                    sinusitis                   2014                      

             

 

                    high blood pressure                   2014            

                    

  

 

                    cough                   2014                   Was hig

ht at Dr Claire's 

office so he started he on amlodipine 10mg but seems to be dragging her down. 
Patient decreased to 1/2 tablet this last week                

 

                    lab draw                   2014                       

            

 

                    cough                   2014                          

         

 

                    cough                   10/15/2013                          

         

 

                    high blood pressure                   2013            

                    

  

 

                    follow up                   2013                   ER 

               

 

                    dizziness                   2013                      

             

 

                    follow up                   2013                      

             

 

                    otalgia                   05/10/2013                        

           

 

                    breast complaint                   2013               

                    

 

                    lab draw                   2012                       

            

 

                    follow up                   2012                   2mo

 fwup               



 

                    follow up                   10/23/2012                   2wk

 fwup               



 

                    gas and bloating                   10/10/2012               

    Dr Claire has her

monitoring because was high in his office at last visit                

 

                    injection(s)                   2012                   

                

 

                    dizziness                   2012                      

             

 

                    dizziness                   2012                      

             

 

                    lab draw                   2012                       

            

 

                    muscle weakness                   2012                

   concerns of 

simvastatin with fatigue and muscle weakness                

 

                    leg pain/sciatica                   2012              

                    



 

                    hip pain                   2012                       

            

 

                    back pain                   2012                   has

 tried ice pack, 

muscle relaxers, and moist heat                

 

                    back pain                   2012                      

             

 

                    fatigue                   2012                   in ha

nds/feet            

   

 

                    otalgia                   2011                        

           

 

                    follow up                   2011                   2wk

 fwup               



 

                    back pain                   2011                   thi

nks ulcer may have 

returned                

 

                    lab draw                   2011                       

            

 

                    dizziness                   2011                   Lef

t ear and facial 

pain, right ear pain                 

 

                    follow up                   2011                   1wk

 fwup               



 

                    hip pain                   2011                   feel

s very draggy, 

fatigue, BP 80/63, normally running 100's/60's                

 

                    chills                   2010                         

          

 

                    injection(s)                   10/06/2010                   

flu shot            

   

 

                    follow up                   2010                   6wk

 fwup, had HH 

repaired and is starting to feel better, started on reglan 10mg tid             
  

 

                    follow up                   2010                   hos

p fwup              

 

 

                    follow up                   2010                   1mo

 fwup, saw Dr Claire 

and hasn't gave cardiac clearance yet for HH repair, did have chemical stress 
test yesterday and waiting on results                

 

                    follow up                   2010                   fro

m 

EGD/colonoscopy--has Hiatal Hernia and wants to do repair                

 

                    follow up                   2010                      

             



                                                                              



Results

                      



                    Observation                   Observation Code              

     Item           

                    Item Code                   Result                   Date   

             

 

                    GFR CALC                   [id number]                   GFR Non

 Afr Amr            

                                        48 mL/min                   06/10/2019  

              

 

                    GFR CALC                   0703023                   GFR Afr

 Amr                

                                        59 mL/min                   06/10/2019  

              

 

                    THYROID STIMULATING HORMONE                   40160         

          TSH       

                                        1.936 uIU/mL                   06/10/201

9         

      

 

                    COMPREHENSIVE METABOLIC                   92024             

      AST           

                                        18 U/L                   06/10/2019     

           

 

                    COMPREHENSIVE METABOLIC                   87040             

      ALT           

                                        11 U/L                   06/10/2019     

           

 

                    COMPREHENSIVE METABOLIC                   37829             

      BUN           

                                        18 mg/dL                   06/10/2019   

             

 

                    COMPREHENSIVE METABOLIC                   14743             

      ALBUMIN       

                                        4.2 g/dL                   06/10/2019   

          

  

 

                    COMPREHENSIVE METABOLIC                   28308             

      CHLORIDE      

                                        109 mmol/L                   06/10/2019 

         

     

 

                    COMPREHENSIVE METABOLIC                   10224             

      Bili Total    

                                        0.4 mg/dL                   06/10/2019  

       

      

 

                    COMPREHENSIVE METABOLIC                   76879             

      ALK PHOS      

                                        64 U/L                   06/10/2019     

         

 

 

                    COMPREHENSIVE METABOLIC                   30943             

      SODIUM        

                                        142 mmol/L                   06/10/2019 

           

   

 

                    COMPREHENSIVE METABOLIC                   45451             

      CREATININE    

                                        1.09 mg/dL                   06/10/2019 

       

       

 

                    COMPREHENSIVE METABOLIC                   53244             

      CALCIUM       

                                        9.3 mg/dL                   06/10/2019  

          

   

 

                    COMPREHENSIVE METABOLIC                   11173             

      POTASSIUM     

                                        4.7 mmol/L                   06/10/2019 

        

      

 

                    COMPREHENSIVE METABOLIC                   05599             

      Total Protein 

                                        6.3 g/dL                   06/10/2019   

    

        

 

                    COMPREHENSIVE METABOLIC                   23851             

      Glucose       

                                        84 mg/dL                   06/10/2019   

          

  

 

                    COMPREHENSIVE METABOLIC                   36818             

      Bicarbonate   

                                        25 mmol/L                   06/10/2019  

      

       

 

                    COMPREHENSIVE METABOLIC                   28268             

      AGAP          

                                        8 mmol/L                   06/10/2019   

             

 

                    COMPLETE BLOOD COUNT                   [id number]              

     WBC            

                                        7.8 10e9/L                   06/10/2019 

               

 

                    COMPLETE BLOOD COUNT                   [id number]              

     RBC            

                                        4.04 10e12/L                   06/10/201

9              

 

 

                    COMPLETE BLOOD COUNT                   [id number]              

     HEMOGLOBIN     

                                        12.2 g/dL                   06/10/2019  

        

     

 

                    COMPLETE BLOOD COUNT                   [id number]              

     HEMATOCRIT     

                                        38.6 %                   06/10/2019     

        

  

 

                    COMPLETE BLOOD COUNT                   [id number]              

     MCV            

                                        95.5 fL                   06/10/2019    

            

 

                    COMPLETE BLOOD COUNT                   [id number]              

     MCH            

                                        30.2 pg                   06/10/2019    

            

 

                    COMPLETE BLOOD COUNT                   [id number]              

     MCHC           

                                        31.6 g/dL                   06/10/2019  

              

 

                    COMPLETE BLOOD COUNT                   [id number]              

     PLATELET COUNT 

                                        215 10e9/L                   06/10/2019 

    

          

 

                    COMPLETE BLOOD COUNT                   [id number]              

     Mean Plt Volume

                                        11.2 fL                   06/10/2019    

   

        

 

                    COMPLETE BLOOD COUNT                   [id number]              

     Neut Auto      

                                        45.7 %                   06/10/2019     

         

 

 

                    COMPLETE BLOOD COUNT                   [id number]              

     Lymph Auto     

                                        42.1 %                   06/10/2019     

        

  

 

                    COMPLETE BLOOD COUNT                   [id number]              

     Mono Auto      

                                        9.2 %                   06/10/2019      

         



 

                    COMPLETE BLOOD COUNT                   [id number]              

     RDW            

                                        13.4 %                   06/10/2019     

           

 

                    COMPLETE BLOOD COUNT                   [id number]              

     Eos Auto       

                                        2.7 %                   06/10/2019      

          

 

                    COMPLETE BLOOD COUNT                   [id number]              

     Baso Auto      

                                        0.3 %                   06/10/2019      

         



 

                    COMPLETE BLOOD COUNT                   [id number]              

     Neutrophil Abs 

                                        3.56 10e9/L                   06/10/2019

    

           

 

                    COMPLETE BLOOD COUNT                   [id number]              

     Lymphocyte Abs 

                                        3.28 10e9/L                   06/10/2019

    

           

 

                    COMPLETE BLOOD COUNT                   [id number]              

     Monocyte Abs   

                                        0.72 10e9/L                   06/10/2019

      

         

 

                    COMPLETE BLOOD COUNT                   [id number]              

     Eosinophil Abs 

                                        0.21 10e9/L                   06/10/2019

    

           

 

                    COMPLETE BLOOD COUNT                   [id number]              

     RDW-SD         

                                        44.9 fL                   06/10/2019    

            

 

                    COMPLETE BLOOD COUNT                   [id number]              

     Basophil Abs   

                                        0.02 10e9/L                   06/10/2019

      

         

 

                    COMPLETE BLOOD COUNT                   [id number]              

     WBC            

                                        5.2 10e9/L                   2018 

               

 

                    COMPLETE BLOOD COUNT                   [id number]              

     RBC            

                                        4.21 10e12/L                   

8              

 

 

                    COMPLETE BLOOD COUNT                   [id number]              

     HEMOGLOBIN     

                                        12.8 g/dL                   2018  

        

     

 

                    COMPLETE BLOOD COUNT                   [id number]              

     HEMATOCRIT     

                                        39.0 %                   2018     

        

  

 

                    COMPLETE BLOOD COUNT                   [id number]              

     MCV            

                                        92.6 fL                   2018    

            

 

                    COMPLETE BLOOD COUNT                   [id number]              

     MCH            

                                        30.4 pg                   2018    

            

 

                    COMPLETE BLOOD COUNT                   [id number]              

     MCHC           

                                        32.8 g/dL                   2018  

              

 

                    COMPLETE BLOOD COUNT                   [id number]              

     PLATELET COUNT 

                                        202 10e9/L                   2018 

    

          

 

                    COMPLETE BLOOD COUNT                   [id number]              

     Mean Plt Volume

                                        10.7 fL                   2018    

   

        

 

                    COMPLETE BLOOD COUNT                   [id number]              

     Neut Auto      

                                        40.9 %                   2018     

         

 

 

                    COMPLETE BLOOD COUNT                   [id number]              

     Lymph Auto     

                                        46.3 %                   2018     

        

  

 

                    COMPLETE BLOOD COUNT                   [id number]              

     Mono Auto      

                                        9.3 %                   2018      

         



 

                    COMPLETE BLOOD COUNT                   [id number]              

     RDW            

                                        13.7 %                   2018     

           

 

                    COMPLETE BLOOD COUNT                   [id number]              

     Eos Auto       

                                        2.9 %                   2018      

          

 

                    COMPLETE BLOOD COUNT                   [id number]              

     Baso Auto      

                                        0.6 %                   2018      

         



 

                    COMPLETE BLOOD COUNT                   [id number]              

     Neutrophil Abs 

                                        2.13 10e9/L                   2018

    

           

 

                    COMPLETE BLOOD COUNT                   [id number]              

     Lymphocyte Abs 

                                        2.41 10e9/L                   2018

    

           

 

                    COMPLETE BLOOD COUNT                   [id number]              

     Monocyte Abs   

                                        0.48 10e9/L                   2018

      

         

 

                    COMPLETE BLOOD COUNT                   [id number]              

     Eosinophil Abs 

                                        0.15 10e9/L                   2018

    

           

 

                    COMPLETE BLOOD COUNT                   [id number]              

     RDW-SD         

                                        45.2 fL                   2018    

            

 

                    COMPLETE BLOOD COUNT                   [id number]              

     Basophil Abs   

                                        0.03 10e9/L                   2018

      

         

 

                    METABOLIC PANEL TOTAL CA                   19761            

       Glucose      

                                        118 mg/dL                   2018  

         

    

 

                    METABOLIC PANEL TOTAL CA                   87836            

       CREATININE   

                                        1.01 mg/dL                   2018 

      

        

 

                    METABOLIC PANEL TOTAL CA                   51466            

       BUN          

                                        14 mg/dL                   2018   

             

 

                    METABOLIC PANEL TOTAL CA                   03442            

       SODIUM       

                                        141 mmol/L                   2018 

          

    

 

                    METABOLIC PANEL TOTAL CA                   19399            

       POTASSIUM    

                                        4.0 mmol/L                   2018 

       

       

 

                    METABOLIC PANEL TOTAL CA                   66444            

       CHLORIDE     

                                        108 mmol/L                   2018 

        

      

 

                    METABOLIC PANEL TOTAL CA                   98847            

       Bicarbonate  

                                        25 mmol/L                   2018  

     

        

 

                    METABOLIC PANEL TOTAL CA                   81153            

       AGAP         

                                        8 mmol/L                   2018   

            



 

                    METABOLIC PANEL TOTAL CA                   90471            

       CALCIUM      

                                        9.6 mg/dL                   2018  

         

    

 

                FREE T4                   05751                   T4 Free       

                

                          1.23 ng/dL                   2018               

 

 

                    GFR CALC                   [id number]                   GFR Non

 Afr Amr            

                                        53 mL/min                   2018  

              

 

                    GFR CALC                   [id number]                   GFR Afr

 Amr                

                                        >60 mL/min                   2018 

               

 

                    THYROID STIMULATING HORMONE                   27050         

          TSH       

                                        2.124 uIU/mL                   

8         

      

 

                    LIPID GROUP                   [id number]                   Choles

terol               

                                        152 mg/dL                   2018  

              

 

                    LIP[employer]                   [id number]                   Trigly

ceride              

                                        151 mg/dL                   2018  

              

 

                    LIP[employer]                   [id number]                   HDL CH

OLESTEROL           

                                        47 mg/dL                   2018   

             

 

                    LIP[employer]                   [id number]                   Chol/H

DL Ratio            

                                        3.23 ratio                   2018 

               

 

                    LIP[employer]                   [id number]                   NON-HD

L Chol              

                                        105 mg/dL                   2018  

              

 

                    LIP[employer] GROUP                   [id number]                   LDL Ch

olesterol           

                                        75 mg/dL                   2018   

             

 

                    ASSAY OF TROPONIN QUANT                   23401             

      Troponin-I    

                                        <0.30 ng/mL                   2018

       

        

 

                    COMPREHENSIVE METABOLIC                   87898             

      AST           

                                        20 U/L                   2018     

           

 

                    COMPREHENSIVE METABOLIC                   91036             

      ALT           

                                        14 U/L                   2018     

           

 

                    COMPREHENSIVE METABOLIC                   85465             

      BUN           

                                        19 mg/dL                   2018   

             

 

                    COMPREHENSIVE METABOLIC                   08998             

      ALBUMIN       

                                        4.2 g/dL                   2018   

          

  

 

                    COMPREHENSIVE METABOLIC                   57690             

      CHLORIDE      

                                        102 mmol/L                   2018 

         

     

 

                    COMPREHENSIVE METABOLIC                   92269             

      Bili Total    

                                        0.4 mg/dL                   2018  

       

      

 

                    COMPREHENSIVE METABOLIC                   57925             

      ALK PHOS      

                                        66 U/L                   2018     

         

 

 

                    COMPREHENSIVE METABOLIC                   18282             

      SODIUM        

                                        135 mmol/L                   2018 

           

   

 

                    COMPREHENSIVE METABOLIC                   37470             

      CREATININE    

                                        1.01 mg/dL                   2018 

       

       

 

                    COMPREHENSIVE METABOLIC                   64859             

      CALCIUM       

                                        9.3 mg/dL                   2018  

          

   

 

                    COMPREHENSIVE METABOLIC                   86703             

      POTASSIUM     

                                        4.8 mmol/L                   2018 

        

      

 

                    COMPREHENSIVE METABOLIC                   12356             

      Total Protein 

                                        7.0 g/dL                   2018   

    

        

 

                    COMPREHENSIVE METABOLIC                   68034             

      Glucose       

                                        91 mg/dL                   2018   

          

  

 

                    COMPREHENSIVE METABOLIC                   27933             

      Bicarbonate   

                                        23 mmol/L                   2018  

      

       

 

                    COMPREHENSIVE METABOLIC                   84170             

      AGAP          

                                        10 mmol/L                   2018  

             



 

                    COMPLETE BLOOD COUNT                   [id number]              

     WBC            

                                        7.5 10e9/L                   2018 

               

 

                    COMPLETE BLOOD COUNT                   5551335              

     RBC            

                                        4.13 10e12/L                   

8              

 

 

                    COMPLETE BLOOD COUNT                   [id number]              

     HEMOGLOBIN     

                                        12.6 g/dL                   2018  

        

     

 

                    COMPLETE BLOOD COUNT                   [id number]              

     HEMATOCRIT     

                                        38.4 %                   2018     

        

  

 

                    COMPLETE BLOOD COUNT                   [id number]              

     MCV            

                                        93.0 fL                   2018    

            

 

                    COMPLETE BLOOD COUNT                   [id number]              

     MCH            

                                        30.5 pg                   2018    

            

 

                    COMPLETE BLOOD COUNT                   [id number]              

     MCHC           

                                        32.8 g/dL                   2018  

              

 

                    COMPLETE BLOOD COUNT                   [id number]              

     PLATELET COUNT 

                                        204 10e9/L                   2018 

    

          

 

                    COMPLETE BLOOD COUNT                   2200000              

     Mean Plt Volume

                                        10.9 fL                   2018    

   

        

 

                    COMPLETE BLOOD COUNT                   3344113              

     Neut Auto      

                                        43.1 %                   2018     

         

 

 

                    COMPLETE BLOOD COUNT                   [id number]              

     Lymph Auto     

                                        45.0 %                   2018     

        

  

 

                    COMPLETE BLOOD COUNT                   [id number]              

     Mono Auto      

                                        9.2 %                   2018      

         



 

                    COMPLETE BLOOD COUNT                   [id number]              

     RDW            

                                        13.4 %                   2018     

           

 

                    COMPLETE BLOOD COUNT                   1325304              

     Eos Auto       

                                        2.3 %                   2018      

          

 

                    COMPLETE BLOOD COUNT                   3968041              

     Baso Auto      

                                        0.4 %                   2018      

         



 

                    COMPLETE BLOOD COUNT                   1205130              

     Neutrophil Abs 

                                        3.23 10e9/L                   2018

    

           

 

                    COMPLETE BLOOD COUNT                   [id number]              

     Lymphocyte Abs 

                                        3.38 10e9/L                   2018

    

           

 

                    COMPLETE BLOOD COUNT                   [id number]              

     Monocyte Abs   

                                        0.69 10e9/L                   2018

      

         

 

                    COMPLETE BLOOD COUNT                   [id number]              

     Eosinophil Abs 

                                        0.17 10e9/L                   2018

    

           

 

                    COMPLETE BLOOD COUNT                   7040699              

     RDW-SD         

                                        44.4 fL                   2018    

            

 

                    COMPLETE BLOOD COUNT                   [id number]              

     Basophil Abs   

                                        0.03 10e9/L                   2018

      

         

 

                    GFR CALC                   9048590                   GFR Non

 Afr Amr            

                                        53 mL/min                   2018  

              

 

                    GFR CALC                   5436230                   GFR Afr

 Amr                

                                        >60 mL/min                   2018 

               

 

                    GLYCOSYLATED HEMOGLOBIN TEST                   06542        

           Hgb A1c  

                    46884-0                   5.4 %                   2018

    

           

 

                    MEAN GLUC                   [id number]                   Calc M

elisha Gluc            

                                        108 mg/dL                   2018  

              

 

                    MEAN GLUC                   [id number]                   Calc M

elisha Gluc            

                                        114 mg/dL                   2017  

              

 

                    LIPID GROUP                   [id number]                   Choles

terol               

                                        146 mg/dL                   2017  

              

 

                    LIP[employer] GROUP                   [id number]                   Trigly

ceride              

                                        119 mg/dL                   2017  

              

 

                    LIP[employer] GROUP                   [id number]                   HDL CH

OLESTEROL           

                                        47 mg/dL                   2017   

             

 

                    LIP[employer] GROUP                   [id number]                   Chol/H

DL Ratio            

                                        3.11 ratio                   2017 

               

 

                    LIP[employer] GROUP                   [id number]                   NON-HD

L Chol              

                                        99 mg/dL                   2017   

             

 

                    LIPID GROUP                   [id number]                   LDL Ch

olesterol           

                                        75 mg/dL                   2017   

             

 

                    GLYCOSYLATED HEMOGLOBIN TEST                   11097        

           Hgb A1c  

                    43078-0                   5.6 %                   2017

    

           

 

                    COMPREHENSIVE METABOLIC                   94563             

      AST           

                                        22 U/L                   2017     

           

 

                    COMPREHENSIVE METABOLIC                   93862             

      ALT           

                                        12 U/L                   2017     

           

 

                    COMPREHENSIVE METABOLIC                   06985             

      BUN           

                                        17 mg/dL                   2017   

             

 

                    COMPREHENSIVE METABOLIC                   95047             

      ALBUMIN       

                                        4.0 g/dL                   2017   

          

  

 

                    COMPREHENSIVE METABOLIC                   36894             

      CHLORIDE      

                                        110 mmol/L                   2017 

         

     

 

                    COMPREHENSIVE METABOLIC                   38091             

      Bili Total    

                                        0.4 mg/dL                   2017  

       

      

 

                    COMPREHENSIVE METABOLIC                   04474             

      ALK PHOS      

                                        63 U/L                   2017     

         

 

 

                    COMPREHENSIVE METABOLIC                   43862             

      SODIUM        

                                        140 mmol/L                   2017 

           

   

 

                    COMPREHENSIVE METABOLIC                   87043             

      CREATININE    

                                        1.05 mg/dL                   2017 

       

       

 

                    COMPREHENSIVE METABOLIC                   56244             

      CALCIUM       

                                        9.2 mg/dL                   2017  

          

   

 

                    COMPREHENSIVE METABOLIC                   28204             

      POTASSIUM     

                                        4.2 mmol/L                   2017 

        

      

 

                    COMPREHENSIVE METABOLIC                   59660             

      Total Protein 

                                        6.2 g/dL                   2017   

    

        

 

                    COMPREHENSIVE METABOLIC                   14731             

      Glucose       

                                        87 mg/dL                   2017   

          

  

 

                    COMPREHENSIVE METABOLIC                   27325             

      Bicarbonate   

                                        24 mmol/L                   2017  

      

       

 

                    COMPREHENSIVE METABOLIC                   12153             

      AGAP          

                                        6 mmol/L                   2017   

             

 

                    GFR CALC                   0154956                   GFR Non

 Afr Amr            

                                        51 mL/min                   2017  

              

 

                    GFR CALC                   6328625                   GFR Afr

 Amr                

                                        >60 mL/min                   2017 

               

 

                    COMPLETE BLOOD COUNT                   [id number]              

     WBC            

                                        6.7 10e9/L                   2017 

               

 

                    COMPLETE BLOOD COUNT                   [id number]              

     RBC            

                                        4.04 10e12/L                   

7              

 

 

                    COMPLETE BLOOD COUNT                   3409965              

     HEMOGLOBIN     

                                        12.1 g/dL                   2017  

        

     

 

                    COMPLETE BLOOD COUNT                   2018362              

     HEMATOCRIT     

                                        38.0 %                   2017     

        

  

 

                    COMPLETE BLOOD COUNT                   5895143              

     MCV            

                                        94.1 fL                   2017    

            

 

                    COMPLETE BLOOD COUNT                   2894470              

     MCH            

                                        30.0 pg                   2017    

            

 

                    COMPLETE BLOOD COUNT                   8536520              

     MCHC           

                                        31.8 g/dL                   2017  

              

 

                    COMPLETE BLOOD COUNT                   1368268              

     PLATELET COUNT 

                                        206 10e9/L                   2017 

    

          

 

                    COMPLETE BLOOD COUNT                   5037180              

     Mean Plt Volume

                                        11.3 fL                   2017    

   

        

 

                    COMPLETE BLOOD COUNT                   8726693              

     Neut Auto      

                                        35.8 %                   2017     

         

 

 

                    COMPLETE BLOOD COUNT                   [id number]              

     Lymph Auto     

                                        51.6 %                   2017     

        

  

 

                    COMPLETE BLOOD COUNT                   4458524              

     Mono Auto      

                                        8.8 %                   2017      

         



 

                    COMPLETE BLOOD COUNT                   2857803              

     RDW            

                                        13.5 %                   2017     

           

 

                    COMPLETE BLOOD COUNT                   5215811              

     Eos Auto       

                                        3.4 %                   2017      

          

 

                    COMPLETE BLOOD COUNT                   8109435              

     Baso Auto      

                                        0.4 %                   2017      

         



 

                    COMPLETE BLOOD COUNT                   [id number]              

     Neutrophil Abs 

                                        2.40 10e9/L                   2017

    

           

 

                    COMPLETE BLOOD COUNT                   6655224              

     Lymphocyte Abs 

                                        3.46 10e9/L                   2017

    

           

 

                    COMPLETE BLOOD COUNT                   9096113              

     Monocyte Abs   

                                        0.59 10e9/L                   2017

      

         

 

                    COMPLETE BLOOD COUNT                   [id number]              

     Eosinophil Abs 

                                        0.23 10e9/L                   2017

    

           

 

                    COMPLETE BLOOD COUNT                   [id number]              

     RDW-SD         

                                        45.3 fL                   2017    

            

 

                    COMPLETE BLOOD COUNT                   [id number]              

     Basophil Abs   

                                        0.03 10e9/L                   2017

      

         

 

                    THYROID STIMULATING HORMONE                   60729         

          TSH       

                                        1.981 uIU/mL                   

7         

      

 

                    COMPLETE BLOOD COUNT                   [id number]              

     WBC            

                                        6.0 10e9/L                   2017 

               

 

                    COMPLETE BLOOD COUNT                   [id number]              

     RBC            

                                        4.29 10e12/L                   

7              

 

 

                    COMPLETE BLOOD COUNT                   [id number]              

     HEMOGLOBIN     

                                        12.9 g/dL                   2017  

        

     

 

                    COMPLETE BLOOD COUNT                   [id number]              

     HEMATOCRIT     

                                        38.4 %                   2017     

        

  

 

                    COMPLETE BLOOD COUNT                   [id number]              

     MCV            

                                        89.5 fL                   2017    

            

 

                    COMPLETE BLOOD COUNT                   [id number]              

     MCH            

                                        30.1 pg                   2017    

            

 

                    COMPLETE BLOOD COUNT                   [id number]              

     MCHC           

                                        33.6 g/dL                   2017  

              

 

                    COMPLETE BLOOD COUNT                   [id number]              

     PLATELET COUNT 

                                        181 10e9/L                   2017 

    

          

 

                    COMPLETE BLOOD COUNT                   [id number]              

     Mean Plt Volume

                                        11.7 fL                   2017    

   

        

 

                    COMPLETE BLOOD COUNT                   [id number]              

     Neut Auto      

                                        36.9 %                   2017     

         

 

 

                    COMPLETE BLOOD COUNT                   [id number]              

     Lymph Auto     

                                        50.4 %                   2017     

        

  

 

                    COMPLETE BLOOD COUNT                   [id number]              

     Mono Auto      

                                        9.0 %                   2017      

         



 

                    COMPLETE BLOOD COUNT                   [id number]              

     RDW            

                                        13.7 %                   2017     

           

 

                    COMPLETE BLOOD COUNT                   [id number]              

     Eos Auto       

                                        3.4 %                   2017      

          

 

                    COMPLETE BLOOD COUNT                   [id number]              

     Baso Auto      

                                        0.3 %                   2017      

         



 

                    COMPLETE BLOOD COUNT                   [id number]              

     Neutrophil Abs 

                                        2.21 10e9/L                   2017

    

           

 

                    COMPLETE BLOOD COUNT                   [id number]              

     Lymphocyte Abs 

                                        3.02 10e9/L                   2017

    

           

 

                    COMPLETE BLOOD COUNT                   [id number]              

     Monocyte Abs   

                                        0.54 10e9/L                   2017

      

         

 

                    COMPLETE BLOOD COUNT                   [id number]              

     Eosinophil Abs 

                                        0.20 10e9/L                   2017

    

           

 

                    COMPLETE BLOOD COUNT                   [id number]              

     RDW-SD         

                                        44.0 fL                   2017    

            

 

                    COMPLETE BLOOD COUNT                   [id number]              

     Basophil Abs   

                                        0.02 10e9/L                   2017

      

         

 

                    GLYCOSYLATED HEMOGLOBIN TEST                   86387        

           Hgb A1c  

                    39689-0                   5.4 %                   2017

    

           

 

                    THYROID STIMULATING HORMONE                   79674         

          TSH       

                                        2.200 uIU/mL                   

7         

      

 

                    GFR CALC                   4587646                   GFR Non

 Afr Amr            

                                        50 mL/min                   2017  

              

 

                    GFR CALC                   [id number]                   GFR Afr

 Amr                

                                        >60 mL/min                   2017 

               

 

                    MEAN GLUC                   [id number]                   Calc M

elisha Gluc            

                                        108 mg/dL                   2017  

              

 

                    COMPREHENSIVE METABOLIC                   50032             

      AST           

                                        18 U/L                   2017     

           

 

                    COMPREHENSIVE METABOLIC                   20589             

      ALT           

                                        10 U/L                   2017     

           

 

                    COMPREHENSIVE METABOLIC                   96624             

      BUN           

                                        20 mg/dL                   2017   

             

 

                    COMPREHENSIVE METABOLIC                   34825             

      ALBUMIN       

                                        4.1 g/dL                   2017   

          

  

 

                    COMPREHENSIVE METABOLIC                   70475             

      CHLORIDE      

                                        109 mmol/L                   2017 

         

     

 

                    COMPREHENSIVE METABOLIC                   89105             

      Bili Total    

                                        0.6 mg/dL                   2017  

       

      

 

                    COMPREHENSIVE METABOLIC                   58276             

      ALK PHOS      

                                        64 U/L                   2017     

         

 

 

                    COMPREHENSIVE METABOLIC                   71577             

      SODIUM        

                                        141 mmol/L                   2017 

           

   

 

                    COMPREHENSIVE METABOLIC                   65775             

      CREATININE    

                                        1.06 mg/dL                   2017 

       

       

 

                    COMPREHENSIVE METABOLIC                   83130             

      CALCIUM       

                                        9.9 mg/dL                   2017  

          

   

 

                    COMPREHENSIVE METABOLIC                   62801             

      POTASSIUM     

                                        4.2 mmol/L                   2017 

        

      

 

                    COMPREHENSIVE METABOLIC                   18186             

      Total Protein 

                                        6.3 g/dL                   2017   

    

        

 

                    COMPREHENSIVE METABOLIC                   53602             

      Glucose       

                                        99 mg/dL                   2017   

          

  

 

                    COMPREHENSIVE METABOLIC                   74980             

      Bicarbonate   

                                        21 mmol/L                   2017  

      

       

 

                    COMPREHENSIVE METABOLIC                   64176             

      AGAP          

                                        11 mmol/L                   2017  

             



 

                    LIPID GROUP                   [id number]                   Choles

terol               

                                        169 mg/dL                   2016  

              

 

                    LIPID GROUP                   [id number]                   Trigly

ceride              

                                        165 mg/dL                   2016  

              

 

                    LIPID GROUP                   [id number]                   HDL CH

OLESTEROL           

                                        43 mg/dL                   2016   

             

 

                    LIPID GROUP                   [id number]                   Chol/H

DL Ratio            

                                        3.93 ratio                   2016 

               

 

                    LIPID GROUP                   [id number]                   NON-HD

L Chol              

                                        126 mg/dL                   2016  

              

 

                    LIPID GROUP                   [id number]                   LDL Ch

olesterol           

                                        93 mg/dL                   2016   

             

 

                    COMPREHENSIVE METABOLIC                   94522             

      AST           

                                        18 U/L                   2016     

           

 

                    COMPREHENSIVE METABOLIC                   74953             

      ALT           

                                        10 U/L                   2016     

           

 

                    COMPREHENSIVE METABOLIC                   92708             

      BUN           

                                        20 mg/dL                   2016   

             

 

                    COMPREHENSIVE METABOLIC                   00187             

      ALBUMIN       

                                        3.9 g/dL                   2016   

          

  

 

                    COMPREHENSIVE METABOLIC                   50576             

      CHLORIDE      

                                        110 mmol/L                   2016 

         

     

 

                    COMPREHENSIVE METABOLIC                   62681             

      Bili Total    

                                        0.5 mg/dL                   2016  

       

      

 

                    COMPREHENSIVE METABOLIC                   73135             

      ALK PHOS      

                                        72 U/L                   2016     

         

 

 

                    COMPREHENSIVE METABOLIC                   64094             

      SODIUM        

                                        141 mmol/L                   2016 

           

   

 

                    COMPREHENSIVE METABOLIC                   41284             

      CREATININE    

                                        1.12 mg/dL                   2016 

       

       

 

                    COMPREHENSIVE METABOLIC                   05570             

      CALCIUM       

                                        9.7 mg/dL                   2016  

          

   

 

                    COMPREHENSIVE METABOLIC                   18573             

      POTASSIUM     

                                        4.4 mmol/L                   2016 

        

      

 

                    COMPREHENSIVE METABOLIC                   40393             

      Total Protein 

                                        6.2 g/dL                   2016   

    

        

 

                    COMPREHENSIVE METABOLIC                   05654             

      Glucose       

                                        90 mg/dL                   2016   

          

  

 

                    COMPREHENSIVE METABOLIC                   06064             

      Bicarbonate   

                                        23 mmol/L                   2016  

      

       

 

                    COMPREHENSIVE METABOLIC                   34689             

      AGAP          

                                        8 mmol/L                   2016   

             

 

                    GFR CALC                   3437534                   GFR Non

 Afr Amr            

                                        47 mL/min                   2016  

              

 

                    GFR CALC                   6669551                   GFR Afr

 Amr                

                                        57 mL/min                   2016  

              

 

                    GLYCOSYLATED HEMOGLOBIN TEST                   74377        

           Hgb A1c  

                    80395-8                   5.5 %                   2016

    

           

 

                    THYROID STIMULATING HORMONE                   13172         

          TSH       

                                        2.537 uIU/mL                   

6         

      

 

                FREE T4                   70322                   T4 Free       

                

                          1.36 ng/dL                   2016               

 

 

                    COMPLETE BLOOD COUNT                   [id number]              

     WBC            

                                        6.8 10e9/L                   2016 

               

 

                    COMPLETE BLOOD COUNT                   [id number]              

     RBC            

                                        4.20 10e12/L                   

6              

 

 

                    COMPLETE BLOOD COUNT                   1383119              

     HEMOGLOBIN     

                                        12.5 g/dL                   2016  

        

     

 

                    COMPLETE BLOOD COUNT                   5843473              

     HEMATOCRIT     

                                        38.0 %                   2016     

        

  

 

                    COMPLETE BLOOD COUNT                   5539546              

     MCV            

                                        90.5 fL                   2016    

            

 

                    COMPLETE BLOOD COUNT                   [id number]              

     MCH            

                                        29.8 pg                   2016    

            

 

                    COMPLETE BLOOD COUNT                   6045129              

     MCHC           

                                        32.9 g/dL                   2016  

              

 

                    COMPLETE BLOOD COUNT                   9664592              

     PLATELET COUNT 

                                        197 10e9/L                   2016 

    

          

 

                    COMPLETE BLOOD COUNT                   7206204              

     Mean Plt Volume

                                        11.7 fL                   2016    

   

        

 

                    COMPLETE BLOOD COUNT                   4821704              

     Neut Auto      

                                        41.3 %                   2016     

         

 

 

                    COMPLETE BLOOD COUNT                   1010765              

     Lymph Auto     

                                        47.1 %                   2016     

        

  

 

                    COMPLETE BLOOD COUNT                   1190110              

     Mono Auto      

                                        7.8 %                   2016      

         



 

                    COMPLETE BLOOD COUNT                   3218626              

     RDW            

                                        13.8 %                   2016     

           

 

                    COMPLETE BLOOD COUNT                   0821394              

     Eos Auto       

                                        3.4 %                   2016      

          

 

                    COMPLETE BLOOD COUNT                   7271197              

     Baso Auto      

                                        0.4 %                   2016      

         



 

                    COMPLETE BLOOD COUNT                   0819450              

     Neutrophil Abs 

                                        2.81 10e9/L                   2016

    

           

 

                    COMPLETE BLOOD COUNT                   4436972              

     Lymphocyte Abs 

                                        3.20 10e9/L                   2016

    

           

 

                    COMPLETE BLOOD COUNT                   7654145              

     Monocyte Abs   

                                        0.53 10e9/L                   2016

      

         

 

                    COMPLETE BLOOD COUNT                   [id number]              

     Eosinophil Abs 

                                        0.23 10e9/L                   2016

    

           

 

                    COMPLETE BLOOD COUNT                   4600124              

     RDW-SD         

                                        44.4 fL                   2016    

            

 

                    COMPLETE BLOOD COUNT                   [id number]              

     Basophil Abs   

                                        0.03 10e9/L                   2016

      

         

 

                    MEAN GLUC                   [id number]                   Calc M

elisha Gluc            

                                        111 mg/dL                   2016  

              

 

                    METABOLIC PANEL TOTAL CA                   91733            

       Glucose      

                                        89 MG/DL                   2015   

         

   

 

                    METABOLIC PANEL TOTAL CA                   91764            

       CREATININE   

                                        1.12 MG/DL                   2015 

      

        

 

                    METABOLIC PANEL TOTAL CA                   68120            

       BUN          

                                        20 MG/DL                   2015   

             

 

                    METABOLIC PANEL TOTAL CA                   53877            

       SODIUM       

                                        139 MMOL/L                   2015 

          

    

 

                    METABOLIC PANEL TOTAL CA                   45731            

       POTASSIUM    

                                        4.6 MMOL/L                   2015 

       

       

 

                    METABOLIC PANEL TOTAL CA                   89683            

       CHLORIDE     

                                        108 MMOL/L                   2015 

        

      

 

                    METABOLIC PANEL TOTAL CA                   25408            

       BICARB       

                                        26 MMOL/L                   2015  

          

   

 

                    METABOLIC PANEL TOTAL CA                   41917            

       ANION GAP    

                                        5 MEQ/L                   2015    

       

    

 

                    METABOLIC PANEL TOTAL CA                   10762            

       CALCIUM      

                                        10.0 MG/DL                   2015 

         

     

 

                GFR CALC                   8232370                   GFR AA     

                

                          57.0L ML/MIN                   2015             

   

 

                    GFR CALC                   4343756                   GFR NON

-AA                 

                                        47.0L ML/MIN                   

5                

 

                    THYROID STIMULATING HORMONE                   45484         

          TSH       

                                        2.378 uIU/ML                   09/10/201

5         

      

 

                    COMPLETE BLOOD COUNT                   5002514              

     WBC            

                                        6.4 10e9/L                   09/10/2015 

               

 

                    COMPLETE BLOOD COUNT                   4878698              

     RBC            

                                        3.99 10e12/L                   09/10/201

5              

 

 

                    COMPLETE BLOOD COUNT                   1755867              

     HGB            

                                        11.9 g/dL                   09/10/2015  

              

 

                    COMPLETE BLOOD COUNT                   [id number]              

     HCT DET        

                                        36.9 %                   09/10/2015     

           

 

                    COMPLETE BLOOD COUNT                   7994484              

     MCV            

                                        92.5 fL                   09/10/2015    

            

 

                    COMPLETE BLOOD COUNT                   5148102              

     MCH            

                                        29.8 pg                   09/10/2015    

            

 

                    COMPLETE BLOOD COUNT                   7890029              

     MCHC           

                                        32.2 g/dL                   09/10/2015  

              

 

                    COMPLETE BLOOD COUNT                   [id number]              

     PLT            

                                        172 10e9/L                   09/10/2015 

               

 

                    COMPLETE BLOOD COUNT                   [id number]              

     MPV            

                                        11.7 fL                   09/10/2015    

            

 

                    COMPLETE BLOOD COUNT                   9056776              

     ARIK %          

                                        40.4 %                   09/10/2015     

           

 

                    COMPLETE BLOOD COUNT                   [id number]              

     LY %           

                                        48.0 %                   09/10/2015     

           

 

                    COMPLETE BLOOD COUNT                   8939649              

     MON %          

                                        8.3 %                   09/10/2015      

          

 

                    COMPLETE BLOOD COUNT                   1112672              

     EOS  %         

                                        2.8 %                   09/10/2015      

          

 

                    COMPLETE BLOOD COUNT                   4037901              

     BASO %         

                                        0.5 %                   09/10/2015      

          

 

                    COMPLETE BLOOD COUNT                   8942281              

     RDW            

                                        13.6 %                   09/10/2015     

           

 

                    COMPLETE BLOOD COUNT                   [id number]              

     ABS ARIK        

                                        2.59 10e9/L                   09/10/2015

           

    

 

                    COMPLETE BLOOD COUNT                   [id number]              

     ABS LYMPH      

                                        3.07 10e9/L                   09/10/2015

         

      

 

                    COMPLETE BLOOD COUNT                   [id number]              

     ABS MONO       

                                        0.53 10e9/L                   09/10/2015

          

     

 

                    COMPLETE BLOOD COUNT                   [id number]              

     ABS EOS        

                                        0.18 10e9/L                   09/10/2015

           

    

 

                    COMPLETE BLOOD COUNT                   [id number]              

     ABS BASO       

                                        0.03 10e9/L                   09/10/2015

          

     

 

                    COMPLETE BLOOD COUNT                   [id number]              

     RDW-SD         

                                        44.9 fL                   09/10/2015    

            

 

                    LIPID GROUP                   [id number]                   HDL TE

ST                  

                                        42 MG/DL                   09/10/2015   

             

 

                LIPID GROUP                   [id number]                   TRIG      

                

                          177 MG/DL                   09/10/2015                

 

                    LIPID GROUP                   [id number]                   TEST L

DL                  

                                        72 MG/DL                   09/10/2015   

             

 

                LIPID GROUP                   [id number]                   CHOL      

                

                          149 MG/DL                   09/10/2015                

 

                    LIPID GROUP                   [id number]                   RCHOL/

HDL                 

                                        3.55 RATIO                   09/10/2015 

               

 

                    LIPID GROUP                   [id number]                   NON-HD

L CH                

                                        107 MG/DL                   09/10/2015  

              

 

                    GLYCOSYLATED HEMOGLOBIN TEST                   93414        

           A1C HPLC 

                    05345-1                   5.5 %                   09/10/2015

   

            

 

                FREE T4                   00735                   FREE T4       

                

                          1.39 NG/DL                   09/10/2015               

 

 

                GFR CALC                   6971362                   GFR AA     

                

                          55.0L ML/MIN                   09/10/2015             

   

 

                    GFR CALC                   6129539                   GFR NON

-AA                 

                                        46.0L ML/MIN                   09/10/201

5                

 

                    COMPREHENSIVE METABOLIC                   80317             

      AST           

                                        17 U/L                   09/10/2015     

           

 

                    COMPREHENSIVE METABOLIC                   55803             

      ALT           

                                        10 IU/L                   09/10/2015    

            

 

                    COMPREHENSIVE METABOLIC                   69268             

      BUN           

                                        20 MG/DL                   09/10/2015   

             

 

                    COMPREHENSIVE METABOLIC                   28050             

      ALBUMIN       

                                        3.9 GM/DL                   09/10/2015  

          

   

 

                    COMPREHENSIVE METABOLIC                   26027             

      CHLORIDE      

                                        111 MMOL/L                   09/10/2015 

         

     

 

                    COMPREHENSIVE METABOLIC                   73911             

      BILI TOT      

                                        0.4 MG/DL                   09/10/2015  

         

    

 

                    COMPREHENSIVE METABOLIC                   37840             

      ALK PHOS      

                                        70 U/L                   09/10/2015     

         

 

 

                    COMPREHENSIVE METABOLIC                   94876             

      SODIUM        

                                        142 MMOL/L                   09/10/2015 

           

   

 

                    COMPREHENSIVE METABOLIC                   90350             

      CREATININE    

                                        1.16 MG/DL                   09/10/2015 

       

       

 

                    COMPREHENSIVE METABOLIC                   80334             

      CALCIUM       

                                        9.4 MG/DL                   09/10/2015  

          

   

 

                    COMPREHENSIVE METABOLIC                   15689             

      POTASSIUM     

                                        4.6 MMOL/L                   09/10/2015 

        

      

 

                    COMPREHENSIVE METABOLIC                   18695             

      PROT TOT      

                                        6.2 GM/DL                   09/10/2015  

         

    

 

                    COMPREHENSIVE METABOLIC                   26200             

      Glucose       

                                        90 MG/DL                   09/10/2015   

          

  

 

                    COMPREHENSIVE METABOLIC                   82879             

      BICARB        

                                        24 MMOL/L                   09/10/2015  

           

  

 

                    COMPREHENSIVE METABOLIC                   92008             

      ANION GAP     

                                        7 MEQ/L                   09/10/2015    

        

   

 

                    THYROID STIMULATING HORMONE                   13327         

          TSH       

                                        2.427 uIU/ML                   

5         

      

 

                    LIPID GROUP                   [id number]                   HDL TE

ST                  

                                        47 MG/DL                   2015   

             

 

                LIPID GROUP                   [id number]                   TRIG      

                

                          145 MG/DL                   2015                

 

                    LIPID GROUP                   [id number]                   TEST L

DL                  

                                        73 MG/DL                   2015   

             

 

                LIPID GROUP                   [id number]                   CHOL      

                

                          149 MG/DL                   2015                

 

                    LIPID GROUP                   [id number]                   RCHOL/

HDL                 

                                        3.17 RATIO                   2015 

               

 

                    LIPID GROUP                   [id number]                   NON-HD

L CH                

                                        102 MG/DL                   2015  

              

 

                    COMPREHENSIVE METABOLIC                   57133             

      AST           

                                        17 U/L                   2015     

           

 

                    COMPREHENSIVE METABOLIC                   23131             

      ALT           

                                        9 IU/L                   2015     

           

 

                    COMPREHENSIVE METABOLIC                   59553             

      BUN           

                                        19 MG/DL                   2015   

             

 

                    COMPREHENSIVE METABOLIC                   14093             

      ALBUMIN       

                                        4.3 GM/DL                   2015  

          

   

 

                    COMPREHENSIVE METABOLIC                   55441             

      CHLORIDE      

                                        108 MMOL/L                   2015 

         

     

 

                    COMPREHENSIVE METABOLIC                   14899             

      BILI TOT      

                                        0.5 MG/DL                   2015  

         

    

 

                    COMPREHENSIVE METABOLIC                   90282             

      ALK PHOS      

                                        68 U/L                   2015     

         

 

 

                    COMPREHENSIVE METABOLIC                   93818             

      SODIUM        

                                        140 MMOL/L                   2015 

           

   

 

                    COMPREHENSIVE METABOLIC                   11059             

      CREATININE    

                                        1.08 MG/DL                   2015 

       

       

 

                    COMPREHENSIVE METABOLIC                   49695             

      CALCIUM       

                                        9.9 MG/DL                   2015  

          

   

 

                    COMPREHENSIVE METABOLIC                   73862             

      POTASSIUM     

                                        4.3 MMOL/L                   2015 

        

      

 

                    COMPREHENSIVE METABOLIC                   54752             

      PROT TOT      

                                        7.2 GM/DL                   2015  

         

    

 

                    COMPREHENSIVE METABOLIC                   71910             

      Glucose       

                                        94 MG/DL                   2015   

          

  

 

                    COMPREHENSIVE METABOLIC                   97514             

      BICARB        

                                        26 MMOL/L                   2015  

           

  

 

                    COMPREHENSIVE METABOLIC                   57505             

      ANION GAP     

                                        6 MEQ/L                   2015    

        

   

 

                GFR CALC                   [id number]                   GFR AA     

                

                          60.0L ML/MIN                   2015             

   

 

                    GFR CALC                   7609863                   GFR NON

-AA                 

                                        49.0L ML/MIN                   

5                

 

                    GLYCOSYLATED HEMOGLOBIN TEST                   01011        

           A1C HPLC 

                    75429-4                   5.6 %                   2015

   

            

 

                    COMPLETE BLOOD COUNT                   [id number]              

     WBC            

                                        7.2 10e9/L                   2015 

               

 

                    COMPLETE BLOOD COUNT                   [id number]              

     RBC            

                                        4.28 10e12/L                   

5              

 

 

                    COMPLETE BLOOD COUNT                   [id number]              

     HGB            

                                        12.8 g/dL                   2015  

              

 

                    COMPLETE BLOOD COUNT                   [id number]              

     HCT DET        

                                        39.3 %                   2015     

           

 

                    COMPLETE BLOOD COUNT                   6963814              

     MCV            

                                        91.8 fL                   2015    

            

 

                    COMPLETE BLOOD COUNT                   [id number]              

     MCH            

                                        29.9 pg                   2015    

            

 

                    COMPLETE BLOOD COUNT                   [id number]              

     MCHC           

                                        32.6 g/dL                   2015  

              

 

                    COMPLETE BLOOD COUNT                   3189084              

     PLT            

                                        189 10e9/L                   2015 

               

 

                    COMPLETE BLOOD COUNT                   [id number]              

     MPV            

                                        11.2 fL                   2015    

            

 

                    COMPLETE BLOOD COUNT                   [id number]              

     ARIK %          

                                        38.0 %                   2015     

           

 

                    COMPLETE BLOOD COUNT                   [id number]              

     LY %           

                                        51.0 %                   2015     

           

 

                    COMPLETE BLOOD COUNT                   9509528              

     MON %          

                                        7.7 %                   2015      

          

 

                    COMPLETE BLOOD COUNT                   4805255              

     EOS  %         

                                        2.9 %                   2015      

          

 

                    COMPLETE BLOOD COUNT                   1344563              

     BASO %         

                                        0.4 %                   2015      

          

 

                    COMPLETE BLOOD COUNT                   [id number]              

     RDW            

                                        14.0 %                   2015     

           

 

                    COMPLETE BLOOD COUNT                   6379983              

     ABS ARIK        

                                        2.74 10e9/L                   2015

           

    

 

                    COMPLETE BLOOD COUNT                   [id number]              

     ABS LYMPH      

                                        3.67 10e9/L                   2015

         

      

 

                    COMPLETE BLOOD COUNT                   [id number]              

     ABS MONO       

                                        0.55 10e9/L                   2015

          

     

 

                    COMPLETE BLOOD COUNT                   [id number]              

     ABS EOS        

                                        0.21 10e9/L                   2015

           

    

 

                    COMPLETE BLOOD COUNT                   [id number]              

     ABS BASO       

                                        0.03 10e9/L                   2015

          

     

 

                    COMPLETE BLOOD COUNT                   [id number]              

     RDW-SD         

                                        46.1 fL                   2015    

            

 

                FREE T4                   53254                   FREE T4       

                

                          1.14 NG/DL                   2015               

 

 

                    GLYCOSYLATED HEMOGLOBIN TEST                   65340        

           A1C HPLC 

                    86247-7                   5.2 %                   2014

   

            

 

                FREE T4                   23984                   FREE T4       

                

                          1.40 NG/DL                   2014               

 

 

                GFR CALC                   9562010                   GFR AA     

                

                          >60 ML/MIN                   2014               

 

 

                    GFR CALC                   6316588                   GFR NON

-AA                 

                                        52.0L ML/MIN                   

4                

 

                    COMPREHENSIVE METABOLIC                   09939             

      AST           

                                        15 U/L                   2014     

           

 

                    COMPREHENSIVE METABOLIC                   77010             

      ALT           

                                        9 IU/L                   2014     

           

 

                    COMPREHENSIVE METABOLIC                   83107             

      BUN           

                                        17 MG/DL                   2014   

             

 

                    COMPREHENSIVE METABOLIC                   22728             

      ALBUMIN       

                                        4.0 GM/DL                   2014  

          

   

 

                    COMPREHENSIVE METABOLIC                   25433             

      CHLORIDE      

                                        112 MMOL/L                   2014 

         

     

 

                    COMPREHENSIVE METABOLIC                   53768             

      BILI TOT      

                                        0.5 MG/DL                   2014  

         

    

 

                    COMPREHENSIVE METABOLIC                   20477             

      ALK PHOS      

                                        66 U/L                   2014     

         

 

 

                    COMPREHENSIVE METABOLIC                   94301             

      SODIUM        

                                        140 MMOL/L                   2014 

           

   

 

                    COMPREHENSIVE METABOLIC                   15607             

      CREATININE    

                                        1.03 MG/DL                   2014 

       

       

 

                    COMPREHENSIVE METABOLIC                   18575             

      CALCIUM       

                                        9.5 MG/DL                   2014  

          

   

 

                    COMPREHENSIVE METABOLIC                   23660             

      POTASSIUM     

                                        4.1 MMOL/L                   2014 

        

      

 

                    COMPREHENSIVE METABOLIC                   70677             

      PROT TOT      

                                        6.2 GM/DL                   2014  

         

    

 

                    COMPREHENSIVE METABOLIC                   14807             

      Glucose       

                                        102 MG/DL                   2014  

          

   

 

                    COMPREHENSIVE METABOLIC                   30978             

      BICARB        

                                        23 MMOL/L                   2014  

           

  

 

                    COMPREHENSIVE METABOLIC                   72921             

      ANION GAP     

                                        5 MEQ/L                   2014    

        

   

 

                    THYROID STIMULATING HORMONE                   33229         

          TSH       

                                        2.074 uIU/ML                   01/22/201

4         

      

 

                    VITAMIN B 12 FOLIC ACID                   [id number]       

            VIT B 12

                                        423 PG/ML                   2014  

   

          

 

                    VITAMIN B 12 FOLIC ACID                   [id number]       

            FOLIC 

ACID                                       19.7 NG/ML                   

2014                

 

                    LIPID GROUP                   [id number]                   HDL TE

ST                  

                                        40 MG/DL                   2014   

             

 

                LIPID GROUP                   [id number]                   TRIG      

                

                          145 MG/DL                   2014                

 

                    LIPID GROUP                   [id number]                   TEST L

DL                  

                                        81 MG/DL                   2014   

             

 

                LIPID GROUP                   [id number]                   CHOL      

                

                          150 MG/DL                   2014                

 

                    LIPID GROUP                   [id number]                   RCHOL/

HDL                 

                                        3.75 RATIO                   2014 

               

 

                    COMPLETE BLOOD COUNT                   [id number]              

     WBC            

                                        6.0 10e9/L                   2014 

               

 

                    COMPLETE BLOOD COUNT                   [id number]              

     RBC            

                                        4.26 10e12/L                   

4              

 

 

                    COMPLETE BLOOD COUNT                   [id number]              

     HGB            

                                        12.7 g/dL                   2014  

              

 

                    COMPLETE BLOOD COUNT                   [id number]              

     HCT DET        

                                        38.7 %                   2014     

           

 

                    COMPLETE BLOOD COUNT                   1391985              

     MCV            

                                        90.8 fL                   2014    

            

 

                    COMPLETE BLOOD COUNT                   [id number]              

     MCH            

                                        29.8 pg                   2014    

            

 

                    COMPLETE BLOOD COUNT                   [id number]              

     MCHC           

                                        32.8 g/dL                   2014  

              

 

                    COMPLETE BLOOD COUNT                   [id number]              

     PLT            

                                        178 10e9/L                   2014 

               

 

                    COMPLETE BLOOD COUNT                   [id number]              

     MPV            

                                        11.7 fL                   2014    

            

 

                    COMPLETE BLOOD COUNT                   7975405              

     ARIK %          

                                        30.5 %                   2014     

           

 

                    COMPLETE BLOOD COUNT                   [id number]              

     LY %           

                                        55.4 %                   2014     

           

 

                    COMPLETE BLOOD COUNT                   [id number]              

     MON %          

                                        9.0 %                   2014      

          

 

                    COMPLETE BLOOD COUNT                   4373725              

     EOS  %         

                                        4.4 %                   2014      

          

 

                    COMPLETE BLOOD COUNT                   [id number]              

     BASO %         

                                        0.7 %                   2014      

          

 

                    COMPLETE BLOOD COUNT                   [id number]              

     RDW            

                                        13.3 %                   2014     

           

 

                    COMPLETE BLOOD COUNT                   [id number]              

     ABS ARIK        

                                        1.83 10e9/L                   2014

           

    

 

                    COMPLETE BLOOD COUNT                   [id number]              

     ABS LYMPH      

                                        3.32 10e9/L                   2014

         

      

 

                    COMPLETE BLOOD COUNT                   [id number]              

     ABS MONO       

                                        0.54 10e9/L                   2014

          

     

 

                    COMPLETE BLOOD COUNT                   [id number]              

     ABS EOS        

                                        0.26 10e9/L                   2014

           

    

 

                    COMPLETE BLOOD COUNT                   [id number]              

     ABS BASO       

                                        0.04 10e9/L                   2014

          

     

 

                    COMPLETE BLOOD COUNT                   [id number]              

     RDW-SD         

                                        43.2 fL                   2014    

            

 

                    HEMOGLOBIN A1C (GLYCOSYLATED)                   [id number]     

              A1C 

HPLC                   37629-3                   5.5 %                   

2012                

 

                    COMPLETE BLOOD COUNT                   [id number]              

     WBC            

                                        6.0 10e9/L                   2012 

               

 

                    COMPLETE BLOOD COUNT                   [id number]              

     RBC            

                                        4.22 10e12/L                   

2              

 

 

                    COMPLETE BLOOD COUNT                   [id number]              

     HGB            

                                        12.4 g/dL                   2012  

              

 

                    COMPLETE BLOOD COUNT                   [id number]              

     HCT DET        

                                        38.2 %                   2012     

           

 

                    COMPLETE BLOOD COUNT                   [id number]              

     MCV            

                                        90.5 fL                   2012    

            

 

                    COMPLETE BLOOD COUNT                   [id number]              

     MCH            

                                        29.4 pg                   2012    

            

 

                    COMPLETE BLOOD COUNT                   [id number]              

     MCHC           

                                        32.5 g/dL                   2012  

              

 

                    COMPLETE BLOOD COUNT                   [id number]              

     PLT            

                                        187 10e9/L                   2012 

               

 

                    COMPLETE BLOOD COUNT                   [id number]              

     MPV            

                                        11.5 fL                   2012    

            

 

                    COMPLETE BLOOD COUNT                   [id number]              

     ARIK %          

                                        36.4 %                   2012     

           

 

                    COMPLETE BLOOD COUNT                   [id number]              

     LY %           

                                        51.0 %                   2012     

           

 

                    COMPLETE BLOOD COUNT                   [id number]              

     MON %          

                                        8.7 %                   2012      

          

 

                    COMPLETE BLOOD COUNT                   [id number]              

     EOS  %         

                                        3.2 %                   2012      

          

 

                    COMPLETE BLOOD COUNT                   [id number]              

     BASO %         

                                        0.7 %                   2012      

          

 

                    COMPLETE BLOOD COUNT                   [id number]              

     RDW            

                                        13.7 %                   2012     

           

 

                    COMPLETE BLOOD COUNT                   [id number]              

     ABS ARIK        

                                        2.18 10e9/L                   2012

           

    

 

                    COMPLETE BLOOD COUNT                   [id number]              

     ABS LYMPH      

                                        3.06 10e9/L                   2012

         

      

 

                    COMPLETE BLOOD COUNT                   [id number]              

     ABS MONO       

                                        0.52 10e9/L                   2012

          

     

 

                    COMPLETE BLOOD COUNT                   [id number]              

     ABS EOS        

                                        0.19 10e9/L                   2012

           

    

 

                    COMPLETE BLOOD COUNT                   [id number]              

     ABS BASO       

                                        0.04 10e9/L                   2012

          

     

 

                    COMPLETE BLOOD COUNT                   [id number]              

     RDW-SD         

                                        44.3 fL                   2012    

            

 

                    LIPID GROUP                   [id number]                   HDL TE

ST                  

                                        42 MG/DL                   2012   

             

 

                LIPID GROUP                   [id number]                   TRIG      

                

                          156 MG/DL                   2012                

 

                    LIPID GROUP                   [id number]                   TEST L

DL                  

                                        80 MG/DL                   2012   

             

 

                LIPID GROUP                   [id number]                   CHOL      

                

                          153 MG/DL                   2012                

 

                    LIPID GROUP                   [id number]                   RCHOL/

HDL                 

                                        3.64 RATIO                   2012 

               

 

                FREE T4                   70152                   FREE T4       

                

                          1.22 NG/DL                   2012               

 

 

                    COMPREHENSIVE METABOLIC                   45551             

      AST           

                                        20 U/L                   2012     

           

 

                    COMPREHENSIVE METABOLIC                   51104             

      ALT           

                                        11 IU/L                   2012    

            

 

                    COMPREHENSIVE METABOLIC                   20608             

      BUN           

                                        19 MG/DL                   2012   

             

 

                    COMPREHENSIVE METABOLIC                   07135             

      ALBUMIN       

                                        4.3 GM/DL                   2012  

          

   

 

                    COMPREHENSIVE METABOLIC                   25241             

      CHLORIDE      

                                        109 MMOL/L                   2012 

         

     

 

                    COMPREHENSIVE METABOLIC                   98104             

      BILI TOT      

                                        0.6 MG/DL                   2012  

         

    

 

                    COMPREHENSIVE METABOLIC                   37453             

      ALK PHOS      

                                        84 U/L                   2012     

         

 

 

                    COMPREHENSIVE METABOLIC                   66651             

      SODIUM        

                                        142 MMOL/L                   2012 

           

   

 

                    COMPREHENSIVE METABOLIC                   93245             

      CREATININE    

                                        1.09 MG/DL                   2012 

       

       

 

                    COMPREHENSIVE METABOLIC                   35760             

      CALCIUM       

                                        9.8 MG/DL                   2012  

          

   

 

                    COMPREHENSIVE METABOLIC                   97756             

      POTASSIUM     

                                        4.2 MMOL/L                   2012 

        

      

 

                    COMPREHENSIVE METABOLIC                   81344             

      PROT TOT      

                                        6.4 GM/DL                   2012  

         

    

 

                    COMPREHENSIVE METABOLIC                   54195             

      Glucose       

                                        89 MG/DL                   2012   

          

  

 

                    COMPREHENSIVE METABOLIC                   06017             

      BICARB        

                                        25 MMOL/L                   2012  

           

  

 

                    COMPREHENSIVE METABOLIC                   74918             

      ANION GAP     

                                        8 MEQ/L                   2012    

        

   

 

                GFR CALC                   [id number]                   GFR AA     

                

                          60.0L ML/MIN                   2012             

   

 

                    GFR CALC                   [id number]                   GFR NON

-AA                 

                                        49.0L ML/MIN                   

2                

 

                    THYROID STIMULATING HORMONE                   88990         

          TSH       

                                        2.450 uIU/ML                   

2         

      

 

                    COMPREHENSIVE METABOLIC                   16697             

      AST           

                                        22 U/L                   2012     

           

 

                    COMPREHENSIVE METABOLIC                   79550             

      ALT           

                                        14 IU/L                   2012    

            

 

                    COMPREHENSIVE METABOLIC                   99060             

      BUN           

                                        21 MG/DL                   2012   

             

 

                    COMPREHENSIVE METABOLIC                   20345             

      ALBUMIN       

                                        4.3 GM/DL                   2012  

          

   

 

                    COMPREHENSIVE METABOLIC                   61234             

      CHLORIDE      

                                        106 MMOL/L                   2012 

         

     

 

                    COMPREHENSIVE METABOLIC                   51908             

      BILI TOT      

                                        0.4 MG/DL                   2012  

         

    

 

                    COMPREHENSIVE METABOLIC                   34247             

      ALK PHOS      

                                        80 U/L                   2012     

         

 

 

                    COMPREHENSIVE METABOLIC                   19520             

      SODIUM        

                                        141 MMOL/L                   2012 

           

   

 

                    COMPREHENSIVE METABOLIC                   38621             

      CREATININE    

                                        1.13 MG/DL                   2012 

       

       

 

                    COMPREHENSIVE METABOLIC                   17491             

      CALCIUM       

                                        9.4 MG/DL                   2012  

          

   

 

                    COMPREHENSIVE METABOLIC                   09271             

      POTASSIUM     

                                        4.3 MMOL/L                   2012 

        

      

 

                    COMPREHENSIVE METABOLIC                   26881             

      PROT TOT      

                                        6.7 GM/DL                   2012  

         

    

 

                    COMPREHENSIVE METABOLIC                   46742             

      Glucose       

                                        98 MG/DL                   2012   

          

  

 

                    COMPREHENSIVE METABOLIC                   58222             

      BICARB        

                                        25 MMOL/L                   2012  

           

  

 

                    COMPREHENSIVE METABOLIC                   52609             

      ANION GAP     

                                        10 MEQ/L                   2012   

        

    

 

                    LIPID GROUP                   [id number]                   HDL TE

ST                  

                                        44 MG/DL                   2012   

             

 

                LIPID GROUP                   [id number]                   TRIG      

                

                          164 MG/DL                   2012                

 

                    LIPID GROUP                   [id number]                   TEST L

DL                  

                                        98 MG/DL                   2012   

             

 

                LIPID GROUP                   [id number]                   CHOL      

                

                          175 MG/DL                   2012                

 

                    LIPID GROUP                   [id number]                   RCHOL/

HDL                 

                                        3.98 RATIO                   2012 

               

 

                    COMPLETE BLOOD COUNT                   99459                

   WBC              

                                        6.7 10e9/L                   2012 

               

 

                    COMPLETE BLOOD COUNT                   96614                

   RBC              

                                        4.36 10e12/L                   

2                

 

                    COMPLETE BLOOD COUNT                   50537                

   HGB              

                                        12.9 g/dL                   2012  

              

 

                    COMPLETE BLOOD COUNT                   13118                

   HCT DET          

                                        39.4 %                   2012     

           

 

                    COMPLETE BLOOD COUNT                   94835                

   MCV              

                                        90.4 fL                   2012    

            

 

                    COMPLETE BLOOD COUNT                   67387                

   MCH              

                                        29.6 pg                   2012    

            

 

                    COMPLETE BLOOD COUNT                   56971                

   MCHC             

                                        32.7 g/dL                   2012  

              

 

                    COMPLETE BLOOD COUNT                   40099                

   PLT              

                                        184 10e9/L                   2012 

               

 

                    COMPLETE BLOOD COUNT                   81313                

   MPV              

                                        10.9 fL                   2012    

            

 

                    COMPLETE BLOOD COUNT                   64492                

   ARIK %            

                                        41.5 %                   2012     

           

 

                    COMPLETE BLOOD COUNT                   30861                

   LY %             

                                        45.7 %                   2012     

           

 

                    COMPLETE BLOOD COUNT                   59788                

   MON %            

                                        9.4 %                   2012      

          

 

                    COMPLETE BLOOD COUNT                   11917                

   EOS  %           

                                        3.0 %                   2012      

          

 

                    COMPLETE BLOOD COUNT                   75166                

   BASO %           

                                        0.4 %                   2012      

          

 

                    COMPLETE BLOOD COUNT                   94572                

   RDW              

                                        13.2 %                   2012     

           

 

                    COMPLETE BLOOD COUNT                   97936                

   ABS ARIK          

                                        2.78 10e9/L                   2012

             

  

 

                    COMPLETE BLOOD COUNT                   63808                

   ABS LYMPH        

                                        3.06 10e9/L                   2012

           

    

 

                    COMPLETE BLOOD COUNT                   98011                

   ABS MONO         

                                        0.63 10e9/L                   2012

            

   

 

                    COMPLETE BLOOD COUNT                   31165                

   ABS EOS          

                                        0.20 10e9/L                   2012

             

  

 

                    COMPLETE BLOOD COUNT                   72457                

   ABS BASO         

                                        0.03 10e9/L                   2012

            

   

 

                    COMPLETE BLOOD COUNT                   19481                

   RDW-SD           

                                        42.3 fL                   2012    

            

 

                GFR CALC                   [id number]                   GFR AA     

                

                          57.0L ML/MIN                   2012             

   

 

                    GFR CALC                   [id number]                   GFR NON

-AA                 

                                        47.0L ML/MIN                   

2                

 

                    THYROID STIMULATING HORMONE                   79587         

          TSH       

                                        2.663 uIU/ML                   

2         

      

 

                FREE T4                   79751                   FREE T4       

                

                          1.15 NG/DL                   2012               

 

 

                    THYROID STIMULATING HORMONE                   87815         

          TSH       

                                        1.908 uIU/ML                   

1         

      

 

                    COMPLETE BLOOD COUNT                   32401                

   WBC              

                                        6.4 10e9/L                   2011 

               

 

                    COMPLETE BLOOD COUNT                   98794                

   RBC              

                                        3.92 10e12/L                   

1                

 

                    COMPLETE BLOOD COUNT                   71637                

   HGB              

                                        11.8 g/dL                   2011  

              

 

                    COMPLETE BLOOD COUNT                   84355                

   HCT DET          

                                        36.0 %                   2011     

           

 

                    COMPLETE BLOOD COUNT                   25599                

   MCV              

                                        91.8 fL                   2011    

            

 

                    COMPLETE BLOOD COUNT                   07643                

   MCH              

                                        30.1 pg                   2011    

            

 

                    COMPLETE BLOOD COUNT                   80757                

   MCHC             

                                        32.8 g/dL                   2011  

              

 

                    COMPLETE BLOOD COUNT                   06981                

   PLT              

                                        176 10e9/L                   2011 

               

 

                    COMPLETE BLOOD COUNT                   72013                

   MPV              

                                        11.4 fL                   2011    

            

 

                    COMPLETE BLOOD COUNT                   63362                

   ARIK %            

                                        50.4 %                   2011     

           

 

                    COMPLETE BLOOD COUNT                   64195                

   LY %             

                                        35.5 %                   2011     

           

 

                    COMPLETE BLOOD COUNT                   76940                

   MON %            

                                        10.2 %                   2011     

           

 

                    COMPLETE BLOOD COUNT                   89317                

   EOS  %           

                                        3.3 %                   2011      

          

 

                    COMPLETE BLOOD COUNT                   92041                

   BASO %           

                                        0.6 %                   2011      

          

 

                    COMPLETE BLOOD COUNT                   13518                

   RDW              

                                        13.7 %                   2011     

           

 

                    COMPLETE BLOOD COUNT                   66432                

   ABS ARIK          

                                        3.23 10e9/L                   2011

             

  

 

                    COMPLETE BLOOD COUNT                   87150                

   ABS LYMPH        

                                        2.27 10e9/L                   2011

           

    

 

                    COMPLETE BLOOD COUNT                   60760                

   ABS MONO         

                                        0.65 10e9/L                   2011

            

   

 

                    COMPLETE BLOOD COUNT                   12587                

   ABS EOS          

                                        0.21 10e9/L                   2011

             

  

 

                    COMPLETE BLOOD COUNT                   85053                

   ABS BASO         

                                        0.04 10e9/L                   2011

            

   

 

                    COMPLETE BLOOD COUNT                   16403                

   RDW-SD           

                                        45.3 fL                   2011    

            

 

                GFR CALC                   [id number]                   GFR AA     

                

                          >60 ML/MIN                   2011               

 

 

                    GFR CALC                   0737890                   GFR NON

-AA                 

                                        53.0L ML/MIN                   

1                

 

                FREE T4                   00363                   FREE T4       

                

                          1.20 NG/DL                   2011               

 

 

                    COMPREHENSIVE METABOLIC                   16232             

      AST           

                                        17 U/L                   2011     

           

 

                    COMPREHENSIVE METABOLIC                   29321             

      ALT           

                                        9 IU/L                   2011     

           

 

                    COMPREHENSIVE METABOLIC                   17735             

      BUN           

                                        16 MG/DL                   2011   

             

 

                    COMPREHENSIVE METABOLIC                   92375             

      ALBUMIN       

                                        4.0 GM/DL                   2011  

          

   

 

                    COMPREHENSIVE METABOLIC                   13316             

      CHLORIDE      

                                        108 MMOL/L                   2011 

         

     

 

                    COMPREHENSIVE METABOLIC                   41748             

      BILI TOT      

                                        0.5 MG/DL                   2011  

         

    

 

                    COMPREHENSIVE METABOLIC                   84970             

      ALK PHOS      

                                        76 U/L                   2011     

         

 

 

                    COMPREHENSIVE METABOLIC                   69521             

      SODIUM        

                                        139 MMOL/L                   2011 

           

   

 

                    COMPREHENSIVE METABOLIC                   85657             

      CREATININE    

                                        1.02 MG/DL                   2011 

       

       

 

                    COMPREHENSIVE METABOLIC                   68870             

      CALCIUM       

                                        9.2 MG/DL                   2011  

          

   

 

                    COMPREHENSIVE METABOLIC                   23116             

      POTASSIUM     

                                        4.5 MMOL/L                   2011 

        

      

 

                    COMPREHENSIVE METABOLIC                   79807             

      PROT TOT      

                                        6.1 GM/DL                   2011  

         

    

 

                    COMPREHENSIVE METABOLIC                   95102             

      Glucose       

                                        93 MG/DL                   2011   

          

  

 

                    COMPREHENSIVE METABOLIC                   14166             

      BICARB        

                                        26 MMOL/L                   2011  

           

  

 

                    COMPREHENSIVE METABOLIC                   34981             

      ANION GAP     

                                        5 MEQ/L                   2011    

        

   

 

                    LIPID GROUP                   [id number]                   HDL TE

ST                  

                                        46 MG/DL                   2011   

             

 

                LIP[employer]                   [id number]                   TRIG      

                

                          102 MG/DL                   2011                

 

                    LIP[employer]                   [id number]                   TEST L

DL                  

                                        88 MG/DL                   2011   

             

 

                LIP[employer]                   [id number]                   CHOL      

                

                          154 MG/DL                   2011                

 

                    LIP[employer] GROUP                   [id number]                   RCHOL/

HDL                 

                                        3.35 RATIO                   2011 

               



                                                                                
                                                                                
                                                                                
                                                                                
                                                                                
                                                                                
                                                                                
                                                                                
                                                                                
                                                                                
                                                          



Review of Systems

                      



                    System                   Result                   Effective 

Dates               



 

                    Constitutional                   No fatigue                 

  2019        

       

 

                    Constitutional                   No fever                   

2019          

     

 

                    Ears/Nose/Throat/Neck                   No dizziness        

           

2019                

 

                    Ears/Nose/Throat/Neck                   No headache         

          2019

               

 

                    Ears/Nose/Throat/Neck                   No nasal discharge  

                 

2019                

 

                    Ears/Nose/Throat/Neck                   No otalgia          

         2019 

              

 

                    Ears/Nose/Throat/Neck                   No sinus congestion 

                  

2019                

 

                    Ears/Nose/Throat/Neck                   No sinusitis        

           

2019                

 

                    Ears/Nose/Throat/Neck                   No postnasal drip   

                

2019                

 

                    Ears/Nose/Throat/Neck                   No otorrhea         

          2019

               

 

                    Ears/Nose/Throat/Neck                   No sore throat      

             

2019                

 

                    Ears/Nose/Throat/Neck                   hearing loss        

           

2019                

 

                    Respiratory                   No cough                   2019             

  

 

                    Respiratory                   No dyspnea                   0

2019           

    

 

                    Gastrointestinal                   No abdominal pain        

           

2019                

 

                    Gastrointestinal                   No constipation          

         2019 

              

 

                    Gastrointestinal                   No diarrhea              

     2019     

          

 

                    Dermatologic                   No rash                   2019             

  

 

                    Dermatologic                   No sores                               

   

 

                    Musculoskeletal                   No myalgias               

    2019      

         

 

                    Cardiovascular                   arrhythmia                 

  06/10/2019        

       

 

                    Cardiovascular                   No chest pain/pressure     

              

06/10/2019                

 

                    Cardiovascular                   No edema                   

06/10/2019          

     

 

                    Cardiovascular                   No exercise intolerance    

               

06/10/2019                

 

                    Cardiovascular                   No orthopnea               

    06/10/2019      

         

 

                    Cardiovascular                   No palpitations            

       06/10/2019   

            

 

                    Cardiovascular                   hypertension               

    06/10/2019      

         

 

                    Gastrointestinal                   gastroesophageal reflux  

                 

06/10/2019                

 

                    Respiratory                   No asthma                   06

/10/2019            

   

 

                    Respiratory                   No cough                   06/

10/2019             

  

 

                    Respiratory                   No dyspnea                   0

6/10/2019           

    

 

                    Respiratory                   No pleuritic pain             

      06/10/2019    

           

 

                    Respiratory                   No productive sputum          

         06/10/2019 

              

 

                    Respiratory                   No wheezing                   

06/10/2019          

     

 

                    Musculoskeletal                   back pain                 

  06/10/2019        

       

 

                    Constitutional                   fatigue                   0

6/10/2019           

    

 

                    Gastrointestinal                   No hemorrhoids           

        2019  

             

 

                    Gastrointestinal                   No hepatitis             

      2019    

           

 

                    Gastrointestinal                   No abdominal pain        

           

2019                

 

                    Gastrointestinal                   No constipation          

         2019 

              

 

                    Gastrointestinal                   No diarrhea              

     2019     

          

 

                    Gastrointestinal                   No gastroesophageal reflu

x                   

2019                

 

                    Gastrointestinal                   No melena                

   2019       

        

 

                    Gastrointestinal                   No nausea                

   2019       

        

 

                    Gastrointestinal                   No vomiting              

     2019     

          

 

                    Cardiovascular                   hypertension               

    2019      

         

 

                    Cardiovascular                   palpitations               

    2019      

         

 

                    Respiratory                   No asthma                               

   

 

                    Respiratory                   No cough                   2019             

  

 

                    Respiratory                   No dyspnea                   0

3/06/2019           

    

 

                    Respiratory                   No pleuritic pain             

      2019    

           

 

                    Respiratory                   No productive sputum          

         2019 

              

 

                    Respiratory                   No wheezing                   

2019          

     

 

                          Genitourinary/Nephrology                   No urinary 

retention/hesitancy       

                                        2019                

 

                    Gastrointestinal                   abdominal pain           

        2018  

             

 

                    Gastrointestinal                   dyspepsia                

   2018       

        

 

                    Gastrointestinal                   gastroesophageal reflux  

                 

2018                

 

                    Ears/Nose/Throat/Neck                   No hearing loss     

              

2018                

 

                    Ears/Nose/Throat/Neck                   No nasal discharge  

                 

2018                

 

                    Ears/Nose/Throat/Neck                   No sinus congestion 

                  

2018                

 

                    Ears/Nose/Throat/Neck                   No sore throat      

             

2018                

 

                    Cardiovascular                   No arrhythmia              

     2018     

          

 

                    Cardiovascular                   No chest pain/pressure     

              

2018                

 

                    Cardiovascular                   No edema                   

2018          

     

 

                    Cardiovascular                   No exercise intolerance    

               

2018                

 

                    Cardiovascular                   No orthopnea               

    2018      

         

 

                    Cardiovascular                   No palpitations            

       2018   

            

 

                    Cardiovascular                   hypertension               

    2018      

         

 

                    Respiratory                   No asthma                               

   

 

                    Respiratory                   No cough                   2018             

  

 

                    Respiratory                   No dyspnea                   1

2018           

    

 

                    Respiratory                   No pleuritic pain             

      2018    

           

 

                    Respiratory                   No productive sputum          

         2018 

              

 

                    Respiratory                   No wheezing                   

2018          

     

 

                    Gastrointestinal                   No hemorrhoids           

        2018  

             

 

                    Gastrointestinal                   No hepatitis             

      2018    

           

 

                    Gastrointestinal                   No abdominal pain        

           

2018                

 

                    Gastrointestinal                   No constipation          

         2018 

              

 

                    Gastrointestinal                   No diarrhea              

     2018     

          

 

                    Gastrointestinal                   No gastroesophageal reflu

x                   

2018                

 

                    Gastrointestinal                   No melena                

   2018       

        

 

                    Gastrointestinal                   No nausea                

   2018       

        

 

                    Gastrointestinal                   No vomiting              

     2018     

          

 

                    Genitourinary/Nephrology                   No dysuria       

            

2018                

 

                    Genitourinary/Nephrology                   No nocturia      

             

2018                

 

                          Genitourinary/Nephrology                   No urinary 

incontinence              

                                        2018                

 

                    Musculoskeletal                   No muscle weakness        

           

2018                

 

                    Musculoskeletal                   No myalgias               

    2018      

         

 

                    Musculoskeletal                   No stiffness              

     2018     

          

 

                    Musculoskeletal                   No swelling               

    2018      

         

 

                    Dermatologic                   No rash                   2018             

  

 

                    Dermatologic                   No scar                   2018             

  

 

                    Neurologic                   No dizziness                   

2018          

     

 

                    Neurologic                   No headache                   1

2018           

    

 

                    Neurologic                   No neck pain                   

2018          

     

 

                    Neurologic                   No syncope                               

   

 

                    Psychiatric                   anxiety                   2018              

 

 

                    Psychiatric                   No depression                 

  2018        

       

 

                    Endocrine                   No goiter                   2018              

 

 

                    Endocrine                   No hyperglycemia                

   2018       

        

 

                    Endocrine                   No hypoglycemia                 

  2018        

       

 

                    Endocrine                   hyperlipidemia                  

 2018         

      

 

                    Psychiatric                   stress                                  



 

                    Constitutional                   fatigue                   1

2018           

    

 

                    Gastrointestinal                   gastroesophageal reflux  

                 

10/02/2018                

 

                    Gastrointestinal                   abdominal pain           

        10/02/2018  

             

 

                    Cardiovascular                   fatigue                   1

           

    

 

                    Constitutional                   fatigue                   1

           

    

 

                    Cardiovascular                   palpitations               

    10/02/2018      

         

 

                    Cardiovascular                   arrhythmia                 

  10/02/2018        

       

 

                    Respiratory                   No asthma                   10

/2018            

   

 

                    Respiratory                   No cough                   10/

2018             

  

 

                    Respiratory                   No dyspnea                   1

           

    

 

                    Respiratory                   No pleuritic pain             

      10/02/2018    

           

 

                    Respiratory                   No productive sputum          

         10/02/2018 

              

 

                    Respiratory                   No wheezing                   

10/02/2018          

     

 

                    Musculoskeletal                   No muscle weakness        

           

10/02/2018                

 

                    Musculoskeletal                   No myalgias               

    10/02/2018      

         

 

                    Musculoskeletal                   No stiffness              

     10/02/2018     

          

 

                    Musculoskeletal                   No swelling               

    10/02/2018      

         

 

                    Neurologic                   No dizziness                   

10/02/2018          

     

 

                    Neurologic                   No headache                   1

           

    

 

                    Neurologic                   No neck pain                   

10/02/2018          

     

 

                    Neurologic                   No syncope                   10

/2018            

   

 

                    Psychiatric                   anxiety                   10/0

2018              

 

 

                    Psychiatric                   No depression                 

  10/02/2018        

       

 

                    Psychiatric                   stress                   10/02

/2018               



 

                    Gastrointestinal                   gastroesophageal reflux  

                 

2018                

 

                    Gastrointestinal                   abdominal pain           

        2018  

             

 

                    Respiratory                   No asthma                               

   

 

                    Respiratory                   No cough                                

  

 

                    Respiratory                   No dyspnea                   0

2018           

    

 

                    Respiratory                   No pleuritic pain             

      2018    

           

 

                    Respiratory                   No productive sputum          

         2018 

              

 

                    Respiratory                   No wheezing                   

2018          

     

 

                    Cardiovascular                   chest pain/pressure        

           

2018                

 

                    Psychiatric                   anxiety                                 

 

 

                    Musculoskeletal                   No muscle weakness        

           

2018                

 

                    Musculoskeletal                   No myalgias               

    2018      

         

 

                    Musculoskeletal                   No stiffness              

     2018     

          

 

                    Musculoskeletal                   No swelling               

    2018      

         

 

                    Psychiatric                   stress                                  



 

                    Constitutional                   fatigue                   0

2018           

    

 

                    Cardiovascular                   hypertension               

    2018      

         

 

                    Psychiatric                   anxiety                                 

 

 

                    Psychiatric                   stress                                  



 

                    Neurologic                   headache                                 

 

 

                    Neurologic                   dizziness                                

  

 

                    Constitutional                   fatigue                   0

2018           

    

 

                    Neurologic                   dizziness                                

  

 

                    Cardiovascular                   hypertension               

    2018      

         

 

                    Cardiovascular                   arrhythmia                 

  2018        

       

 

                    Constitutional                   No chills                  

 2018         

      

 

                    Constitutional                   No fever                   

2018          

     

 

                    Constitutional                   No malaise                 

  2018        

       

 

                    Musculoskeletal                   No muscle weakness        

           

2018                

 

                    Musculoskeletal                   No myalgias               

    2018      

         

 

                    Musculoskeletal                   No stiffness              

     2018     

          

 

                    Musculoskeletal                   No swelling               

    2018      

         

 

                    Neurologic                   No dizziness                   

2018          

     

 

                    Neurologic                   No headache                   0

2018           

    

 

                    Neurologic                   No neck pain                   

2018          

     

 

                    Neurologic                   No syncope                               

   

 

                    Constitutional                   No fever                   

2018          

     

 

                    Constitutional                   No fatigue                 

  2018        

       

 

                    Ears/Nose/Throat/Neck                   otalgia             

      2018    

           

 

                    Ears/Nose/Throat/Neck                   No postnasal drip   

                

2018                

 

                    Ears/Nose/Throat/Neck                   No otorrhea         

          2018

               

 

                    Ears/Nose/Throat/Neck                   No sore throat      

             

2018                

 

                    Ears/Nose/Throat/Neck                   No sinusitis        

           

2018                

 

                    Ears/Nose/Throat/Neck                   No sinus congestion 

                  

2018                

 

                    Respiratory                   No cough                                

  

 

                    Gastrointestinal                   No abdominal pain        

           

2018                

 

                    Gastrointestinal                   No constipation          

         2018 

              

 

                    Neurologic                   No headache                   0

3/26/2018           

    

 

                    Neurologic                   dizziness                                

  

 

                    Neurologic                   No tinnitus                   0

3/26/2018           

    

 

                    Neurologic                   vertigo                                  



 

                    Musculoskeletal                   back pain                 

  2018        

       

 

                    Musculoskeletal                   joint complaint           

        2018  

             

 

                    Gastrointestinal                   abdominal pain           

        2018  

             

 

                    Musculoskeletal                   No muscle weakness        

           

2017                

 

                    Musculoskeletal                   No myalgias               

    2017      

         

 

                    Musculoskeletal                   No stiffness              

     2017     

          

 

                    Musculoskeletal                   No swelling               

    2017      

         

 

                    Musculoskeletal                   low back pain             

      2017    

           

 

                    Musculoskeletal                   thoracic back pain        

           

2017                

 

                    Neurologic                   gait abnormality               

    2017      

         

 

                    Musculoskeletal                   low back pain             

      2017    

           

 

                    Gastrointestinal                   abdominal pain           

        2017  

             

 

                    Constitutional                   No night sweats            

       2017   

            

 

                    Constitutional                   No chills                  

 2017         

      

 

                    Constitutional                   No fatigue                 

  2017        

       

 

                    Constitutional                   No fever                   

2017          

     

 

                    Eyes                   No eye discharge                               

   

 

                    Eyes                   No eye pain                   

017                

 

                    Eyes                   No vision change                               

   

 

                    Ears/Nose/Throat/Neck                   No dizziness        

           

2017                

 

                          Ears/Nose/Throat/Neck                   eustachian tub

e dysfunction             

                                        2017                

 

                    Ears/Nose/Throat/Neck                   No headache         

          2017

               

 

                    Ears/Nose/Throat/Neck                   nasal discharge     

              

2017                

 

                    Ears/Nose/Throat/Neck                   postnasal drip      

             

2017                

 

                    Ears/Nose/Throat/Neck                   sinus congestion    

               

2017                

 

                    Ears/Nose/Throat/Neck                   sore throat         

          2017

               

 

                    Cardiovascular                   No chest pain/pressure     

              

2017                

 

                    Cardiovascular                   No dyspnea                 

  2017        

       

 

                    Cardiovascular                   No orthopnea               

    2017      

         

 

                    Cardiovascular                   No palpitations            

       2017   

            

 

                    Cardiovascular                   No syncope                 

  2017        

       

 

                    Respiratory                   No chest tightness            

       2017   

            

 

                    Respiratory                   cough                   2017                

 

                    Respiratory                   No dyspnea                   0

2017           

    

 

                    Respiratory                   No wheezing                   

2017          

     

 

                    Gastrointestinal                   No diarrhea              

     2017     

          

 

                    Gastrointestinal                   No nausea                

   2017       

        

 

                    Gastrointestinal                   No vomiting              

     2017     

          

 

                          Hematologic/Lymphatic                   No lymph node 

enlargement/mass          

                                        2017                

 

                    Ears/Nose/Throat/Neck                   No facial pain      

             

2017                

 

                    Respiratory                   No chest congestion           

        2017  

             

 

                    Dermatologic                   No rash                   2017             

  

 

                    Dermatologic                   No scar                   2017             

  

 

                    Constitutional                   fatigue                   1

2016           

    

 

                    Constitutional                   fever                   2016             

  

 

                    Ears/Nose/Throat/Neck                   otalgia             

      2016    

           

 

                    Ears/Nose/Throat/Neck                   No sinus congestion 

                  

2016                

 

                    Ears/Nose/Throat/Neck                   No sinusitis        

           

2016                

 

                    Respiratory                   No asthma                               

   

 

                    Respiratory                   No cough                   2016             

  

 

                    Respiratory                   No dyspnea                   1

2016           

    

 

                    Respiratory                   No pleuritic pain             

      2016    

           

 

                    Respiratory                   No productive sputum          

         2016 

              

 

                    Respiratory                   No wheezing                   

2016          

     

 

                    Dermatologic                   No rash                   2016             

  

 

                    Dermatologic                   No scar                   2016             

  

 

                    Gastrointestinal                   No hemorrhoids           

        2016  

             

 

                    Gastrointestinal                   No hepatitis             

      2016    

           

 

                    Gastrointestinal                   No abdominal pain        

           

2016                

 

                    Gastrointestinal                   constipation             

      2016    

           

 

                    Gastrointestinal                   No diarrhea              

     2016     

          

 

                    Gastrointestinal                   No gastroesophageal reflu

x                   

2016                

 

                    Gastrointestinal                   No melena                

   2016       

        

 

                    Gastrointestinal                   No nausea                

   2016       

        

 

                    Gastrointestinal                   No vomiting              

     2016     

          

 

                    Cardiovascular                   No arrhythmia              

     2016     

          

 

                    Cardiovascular                   No chest pain/pressure     

              

2016                

 

                    Cardiovascular                   No edema                   

2016          

     

 

                    Cardiovascular                   No exercise intolerance    

               

2016                

 

                    Cardiovascular                   No orthopnea               

    2016      

         

 

                    Cardiovascular                   No palpitations            

       2016   

            

 

                    Cardiovascular                   hypertension               

    2016      

         

 

                    Respiratory                   No asthma                               

   

 

                    Respiratory                   No cough                                

  

 

                    Respiratory                   No dyspnea                   0

3/16/2016           

    

 

                    Respiratory                   No pleuritic pain             

      2016    

           

 

                    Respiratory                   No productive sputum          

         2016 

              

 

                    Respiratory                   No wheezing                   

2016          

     

 

                    Gastrointestinal                   No hemorrhoids           

        2016  

             

 

                    Gastrointestinal                   No hepatitis             

      2016    

           

 

                    Gastrointestinal                   No abdominal pain        

           

2016                

 

                    Gastrointestinal                   No constipation          

         2016 

              

 

                    Gastrointestinal                   No diarrhea              

     2016     

          

 

                    Gastrointestinal                   No gastroesophageal reflu

x                   

2016                

 

                    Gastrointestinal                   No melena                

   2016       

        

 

                    Gastrointestinal                   No nausea                

   2016       

        

 

                    Gastrointestinal                   No vomiting              

     2016     

          

 

                    Genitourinary/Nephrology                   No dysuria       

            

2016                

 

                    Genitourinary/Nephrology                   No nocturia      

             

2016                

 

                          Genitourinary/Nephrology                   No urinary 

incontinence              

                                        2016                

 

                    Musculoskeletal                   No muscle weakness        

           

2016                

 

                    Musculoskeletal                   No myalgias               

    2016      

         

 

                    Musculoskeletal                   No stiffness              

     2016     

          

 

                    Musculoskeletal                   No swelling               

    2016      

         

 

                    Dermatologic                   No rash                                

  

 

                    Dermatologic                   No scar                                

  

 

                    Neurologic                   No dizziness                   

2016          

     

 

                    Neurologic                   No headache                   0

3/16/2016           

    

 

                    Neurologic                   No neck pain                   

2016          

     

 

                    Neurologic                   No syncope                               

   

 

                    Psychiatric                   No anxiety                   0

3/16/2016           

    

 

                    Psychiatric                   No depression                 

  2016        

       

 

                    Endocrine                   No goiter                                 

 

 

                    Endocrine                   No hyperglycemia                

   2016       

        

 

                    Endocrine                   No hypoglycemia                 

  2016        

       

 

                    Ears/Nose/Throat/Neck                   No hearing loss     

              

2016                

 

                    Ears/Nose/Throat/Neck                   No nasal discharge  

                 

2016                

 

                    Ears/Nose/Throat/Neck                   No sinus congestion 

                  

2016                

 

                    Ears/Nose/Throat/Neck                   No sore throat      

             

2016                

 

                    Constitutional                   insomnia                   

2016          

     

 

                    Constitutional                   fatigue                   1

2015           

    

 

                    Constitutional                   fever                                

  

 

                    Ears/Nose/Throat/Neck                   otalgia             

      2015    

           

 

                    Ears/Nose/Throat/Neck                   No sinus congestion 

                  

2015                

 

                    Ears/Nose/Throat/Neck                   No sinusitis        

           

2015                

 

                    Respiratory                   No asthma                               

   

 

                    Respiratory                   No cough                                

  

 

                    Respiratory                   No dyspnea                   1

2015           

    

 

                    Respiratory                   No pleuritic pain             

      2015    

           

 

                    Respiratory                   No productive sputum          

         2015 

              

 

                    Respiratory                   No wheezing                   

2015          

     

 

                    Dermatologic                   No rash                                

  

 

                    Dermatologic                   No scar                                

  

 

                    Neurologic                   dizziness                                

  

 

                    Psychiatric                   No anxiety                   1

2015           

    

 

                    Psychiatric                   No depression                 

  2015        

       

 

                    Endocrine                   No goiter                                 

 

 

                    Endocrine                   No hyperglycemia                

   2015       

        

 

                    Endocrine                   No hypoglycemia                 

  2015        

       

 

                    Musculoskeletal                   No muscle weakness        

           

2015                

 

                    Musculoskeletal                   No myalgias               

    2015      

         

 

                    Musculoskeletal                   No stiffness              

     2015     

          

 

                    Musculoskeletal                   No swelling               

    2015      

         

 

                    Genitourinary/Nephrology                   No dysuria       

            

2015                

 

                    Genitourinary/Nephrology                   No nocturia      

             

2015                

 

                          Genitourinary/Nephrology                   No urinary 

incontinence              

                                        2015                

 

                    Gastrointestinal                   No hemorrhoids           

        2015  

             

 

                    Gastrointestinal                   No hepatitis             

      2015    

           

 

                    Gastrointestinal                   No abdominal pain        

           

2015                

 

                    Gastrointestinal                   No constipation          

         2015 

              

 

                    Gastrointestinal                   No diarrhea              

     2015     

          

 

                    Gastrointestinal                   No gastroesophageal reflu

x                   

2015                

 

                    Gastrointestinal                   No melena                

   2015       

        

 

                    Gastrointestinal                   No nausea                

   2015       

        

 

                    Gastrointestinal                   No vomiting              

     2015     

          

 

                    Cardiovascular                   No arrhythmia              

     2015     

          

 

                    Cardiovascular                   No chest pain/pressure     

              

2015                

 

                    Cardiovascular                   edema                                

  

 

                    Cardiovascular                   No exercise intolerance    

               

2015                

 

                    Cardiovascular                   No orthopnea               

    2015      

         

 

                    Cardiovascular                   No palpitations            

       2015   

            

 

                    Ears/Nose/Throat/Neck                   No hearing loss     

              

2015                

 

                    Ears/Nose/Throat/Neck                   No nasal discharge  

                 

2015                

 

                    Ears/Nose/Throat/Neck                   No sore throat      

             

2015                

 

                    Ears/Nose/Throat/Neck                   dizziness           

        2015  

             

 

                    Cardiovascular                   hypertension               

    2015      

         

 

                    Musculoskeletal                   arthralgia(s)             

      2015    

           

 

                    Musculoskeletal                   low back pain             

      2015    

           

 

                    Musculoskeletal                   back pain                 

  2015        

       

 

                    Neurologic                   vertigo                                  



 

                    Psychiatric                   stress                                  



 

                    Endocrine                   hyperlipidemia                  

 2015         

      

 

                          Hematologic/Lymphatic                   No abnormal ec

chymoses                  

                                        2015                

 

                    Hematologic/Lymphatic                   No petechiae        

           

2015                

 

                          Hematologic/Lymphatic                   No abnormal bl

eeding and bruising       

                                        2015                

 

                    Hematologic/Lymphatic                   No anemia           

        2015  

             

 

                          Hematologic/Lymphatic                   No lymph node 

enlargement/mass          

                                        2015                

 

                    Constitutional                   fatigue                   0

2015           

    

 

                    Psychiatric                   stress                                  



 

                    Psychiatric                   depression                   0

2015           

    

 

                    Ears/Nose/Throat/Neck                   otalgia             

      2015    

           

 

                    Ears/Nose/Throat/Neck                   No sore throat      

             

2015                

 

                    Ears/Nose/Throat/Neck                   No sinus congestion 

                  

2015                

 

                    Constitutional                   No fever                   

2015          

     

 

                    Respiratory                   No cough                                

  

 

                    Neurologic                   pain, limb                               

   

 

                    Cardiovascular                   chest pain/pressure        

           

2015                

 

                    Musculoskeletal                   back pain                 

  2015        

       

 

                    Musculoskeletal                   muscle spasm              

     2015     

          

 

                    Neurologic                   dizziness                                

  

 

                    Cardiovascular                   hypertension               

    2015      

         

 

                    Dermatologic                   skin lesion                  

 2015         

      

 

                    Ears/Nose/Throat/Neck                   dizziness           

        2014  

             

 

                    Ears/Nose/Throat/Neck                   facial pain         

          2014

               

 

                    Ears/Nose/Throat/Neck                   sinusitis           

        2014  

             

 

                    Ears/Nose/Throat/Neck                   otalgia             

      2014    

           

 

                    Respiratory                   cough                   2014                

 

                    Constitutional                   No fever                   

2014          

     

 

                    Musculoskeletal                   back pain                 

  2014        

       

 

                    Respiratory                   cough                   2014                

 

                          Ears/Nose/Throat/Neck                   eustachian tub

e dysfunction             

                                        2014                

 

                    Ears/Nose/Throat/Neck                   sinus congestion    

               

2014                

 

                    Ears/Nose/Throat/Neck                   sinusitis           

        2014  

             

 

                    Ears/Nose/Throat/Neck                   headache            

       2014   

            

 

                    Ears/Nose/Throat/Neck                   sinusitis           

        2014  

             

 

                    Ears/Nose/Throat/Neck                   sore throat         

          2014

               

 

                    Respiratory                   cough                   2014                

 

                    Constitutional                   No fever                   

2014          

     

 

                    Constitutional                   No recent illness          

         2014 

              

 

                    Respiratory                   cough                   10/15/

2013                

 

                    Cardiovascular                   hypertension               

    10/15/2013      

         

 

                    Endocrine                   hyperlipidemia                  

 10/15/2013         

      

 

                    Endocrine                   obesity                   10/15/

2013                

 

                    Gastrointestinal                   dyspepsia                

   10/15/2013       

        

 

                    Constitutional                   No fever                   

10/15/2013          

     

 

                    Cardiovascular                   hypertension               

    2013      

         

 

                    Neurologic                   dizziness                                

  

 

                    Ears/Nose/Throat/Neck                   sinusitis           

        2013  

             

 

                    Ears/Nose/Throat/Neck                   sinus congestion    

               

2013                

 

                    Ears/Nose/Throat/Neck                   dizziness           

        2013  

             

 

                    Musculoskeletal                   back pain                 

  2013        

       

 

                    Ears/Nose/Throat/Neck                   No hearing loss     

              

2013                

 

                    Ears/Nose/Throat/Neck                   No nasal discharge  

                 

2013                

 

                    Ears/Nose/Throat/Neck                   No sinus congestion 

                  

2013                

 

                    Ears/Nose/Throat/Neck                   No sore throat      

             

2013                

 

                    Constitutional                   No chills                  

 05/10/2013         

      

 

                    Constitutional                   No anorexia                

   05/10/2013       

        

 

                    Constitutional                   recent illness             

      05/10/2013    

           

 

                    Constitutional                   No fever                   

05/10/2013          

     

 

                    Constitutional                   No fatigue                 

  05/10/2013        

       

 

                    Constitutional                   No malaise                 

  05/10/2013        

       

 

                    Constitutional                   No insomnia                

   05/10/2013       

        

 

                    Ears/Nose/Throat/Neck                   sore throat         

          05/10/2013

               

 

                    Ears/Nose/Throat/Neck                   otalgia             

      05/10/2013    

           

 

                    Ears/Nose/Throat/Neck                   sinus congestion    

               

05/10/2013                

 

                    Ears/Nose/Throat/Neck                   headache            

       05/10/2013   

            

 

                    Respiratory                   No cough                   05/

10/2013             

  

 

                    Genitourinary/Nephrology                   breast complaint 

                  

2013                

 

                    Gastrointestinal                   abdominal pain           

        2012  

             

 

                    Gastrointestinal                   dyspepsia                

   2012       

        

 

                    Gastrointestinal                   gastroesophageal reflux  

                 

2012                

 

                    Neurologic                   dizziness                                

  

 

                    Ears/Nose/Throat/Neck                   otalgia             

      2012    

           

 

                    Gastrointestinal                   No hemorrhoids           

        10/23/2012  

             

 

                    Gastrointestinal                   No hepatitis             

      10/23/2012    

           

 

                    Gastrointestinal                   abdominal pain           

        10/23/2012  

             

 

                    Gastrointestinal                   No constipation          

         10/23/2012 

              

 

                    Gastrointestinal                   No diarrhea              

     10/23/2012     

          

 

                    Gastrointestinal                   gastroesophageal reflux  

                 

10/23/2012                

 

                    Gastrointestinal                   No melena                

   10/23/2012       

        

 

                    Gastrointestinal                   No nausea                

   10/23/2012       

        

 

                    Gastrointestinal                   No vomiting              

     10/23/2012     

          

 

                    Gastrointestinal                   gas and bloating         

          10/23/2012

               

 

                    Neurologic                   dizziness                                

  

 

                    Cardiovascular                   hypertension               

    2012      

         

 

                    Endocrine                   hypoglycemia                   0

2012           

    

 

                    Constitutional                   fatigue                   0

2012           

    

 

                    Constitutional                   fatigue                   0

2012           

    

 

                    Neurologic                   dizziness                   2012             

  

 

                    Musculoskeletal                   muscle weakness           

        2012  

             

 

                    Cardiovascular                   hypertension               

    2012      

         

 

                    Endocrine                   diabetes mellitus type 2        

           

2012                

 

                    Musculoskeletal                   sciatica                  

 2012         

      

 

                    Neurologic                   pain, limb                               

   

 

                    Musculoskeletal                   back pain                 

  2012        

       

 

                    Musculoskeletal                   low back pain             

      2012    

           

 

                    Neurologic                   pain, limb                               

   

 

                    Musculoskeletal                   back pain                 

  2012        

       

 

                    Musculoskeletal                   joint complaint           

        2012  

             

 

                    Musculoskeletal                   back pain                 

  2012        

       

 

                    Musculoskeletal                   back pain                 

  2012        

       

 

                    Neurologic                   pain, limb                               

   

 

                    Neurologic                   pain, back                               

   

 

                    Constitutional                   fatigue                   0

2012           

    

 

                    Constitutional                   fatigue                   0

2012           

    

 

                    Cardiovascular                   No hypertension            

       2012   

            

 

                    Cardiovascular                   fatigue                   0

2012           

    

 

                    Cardiovascular                   No chest pain/pressure     

              

2012                

 

                    Respiratory                   No asthma                               

   

 

                    Respiratory                   No pleuritic pain             

      2012    

           

 

                    Respiratory                   No productive sputum          

         2012 

              

 

                    Respiratory                   No cough                                

  

 

                    Respiratory                   No dyspnea                   0

2012           

    

 

                    Respiratory                   No wheezing                   

2012          

     

 

                    Gastrointestinal                   No hemorrhoids           

        2012  

             

 

                    Gastrointestinal                   No hepatitis             

      2012    

           

 

                    Gastrointestinal                   No abdominal pain        

           

2012                

 

                    Gastrointestinal                   No constipation          

         2012 

              

 

                    Gastrointestinal                   No diarrhea              

     2012     

          

 

                    Gastrointestinal                   No gastroesophageal reflu

x                   

2012                

 

                    Gastrointestinal                   No melena                

   2012       

        

 

                    Gastrointestinal                   No nausea                

   2012       

        

 

                    Gastrointestinal                   No vomiting              

     2012     

          

 

                    Genitourinary/Nephrology                   No dysuria       

            

2012                

 

                    Genitourinary/Nephrology                   No nocturia      

             

2012                

 

                          Genitourinary/Nephrology                   No urinary 

incontinence              

                                        2012                

 

                    Musculoskeletal                   arthralgia(s)             

      2012    

           

 

                    Dermatologic                   No rash                                

  

 

                    Dermatologic                   No scar                                

  

 

                    Neurologic                   No dizziness                   

2012          

     

 

                    Neurologic                   No headache                   0

2012           

    

 

                    Neurologic                   No neck pain                   

2012          

     

 

                    Neurologic                   No syncope                               

   

 

                    Psychiatric                   No anxiety                   0

2012           

    

 

                    Psychiatric                   No depression                 

  2012        

       

 

                    Endocrine                   No goiter                                 

 

 

                    Endocrine                   No hyperglycemia                

   2012       

        

 

                    Endocrine                   No hypoglycemia                 

  2012        

       

 

                    Ears/Nose/Throat/Neck                   No hearing loss     

              

2011                

 

                    Ears/Nose/Throat/Neck                   No nasal discharge  

                 

2011                

 

                    Ears/Nose/Throat/Neck                   No sinus congestion 

                  

2011                

 

                    Ears/Nose/Throat/Neck                   sore throat         

          2011

               

 

                    Ears/Nose/Throat/Neck                   sinusitis           

        2011  

             

 

                    Ears/Nose/Throat/Neck                   otalgia             

      2011    

           

 

                    Neurologic                   headache                   2011              

 

 

                    Respiratory                   cough                   2011                

 

                    Musculoskeletal                   back pain                 

  2011        

       

 

                    Gastrointestinal                   abdominal pain           

        2011  

             

 

                    Neurologic                   dizziness                   2011             

  

 

                    Gastrointestinal                   No abdominal pain        

           

2011                

 

                    Gastrointestinal                   gastroesophageal reflux  

                 

2011                

 

                    Gastrointestinal                   No dyspepsia             

      2011    

           

 

                    Gastrointestinal                   No diarrhea              

     2011     

          

 

                    Gastrointestinal                   No constipation          

         2011 

              

 

                    Gastrointestinal                   No nausea                

   2011       

        

 

                    Musculoskeletal                   back pain                 

  2011        

       

 

                    Gastrointestinal                   abdominal pain           

        2011  

             

 

                    Neurologic                   dizziness                                

  

 

                    Ears/Nose/Throat/Neck                   otitis media        

           

2011                

 

                    Ears/Nose/Throat/Neck                   otalgia             

      2011    

           

 

                    Gastrointestinal                   gastroesophageal reflux  

                 

2011                

 

                    Musculoskeletal                   back pain                 

  2011        

       

 

                    Musculoskeletal                   low back pain             

      2011    

           

 

                    Musculoskeletal                   back pain                 

  2011        

       

 

                    Musculoskeletal                   low back pain             

      2011    

           

 

                    Constitutional                   fatigue                   0

2011           

    

 

                    Psychiatric                   stress                                  



 

                    Psychiatric                   depression                   0

2011           

    

 

                    Constitutional                   fever                                

  

 

                    Ears/Nose/Throat/Neck                   headache            

       2010   

            

 

                    Ears/Nose/Throat/Neck                   nasal discharge     

              

2010                

 

                    Ears/Nose/Throat/Neck                   sore throat         

          2010

               

 

                    Ears/Nose/Throat/Neck                   sinusitis           

        2010  

             

 

                    Ears/Nose/Throat/Neck                   sinus congestion    

               

2010                

 

                    Ears/Nose/Throat/Neck                   No tonsillitis      

             

2010                

 

                    Respiratory                   cough                   2010                

 

                    Respiratory                   nocturnal cough               

    2010      

         

 

                    Gastrointestinal                   No diarrhea              

     2010     

          

 

                    Gastrointestinal                   No constipation          

         2010 

              

 

                    Gastrointestinal                   nausea                   

2010          

     

 

                    Gastrointestinal                   No vomiting              

     2010     

          

 

                    Dermatologic                   No rash                                

  

 

                    Dermatologic                   No sores                               

   

 

                    Constitutional                   fatigue                   0

2010           

    

 

                    Gastrointestinal                   gastroesophageal reflux  

                 

2010                

 

                    Psychiatric                   anxiety                                 

 

 

                    Psychiatric                   stress                                  



 

                    Genitourinary/Nephrology                   urinary urgency  

                 

2010                

 

                    Constitutional                   fatigue                   0

2010           

    

 

                    Neurologic                   weakness                                 

 

 

                    Psychiatric                   anxiety                                 

 

 

                    Psychiatric                   stress                                  



 

                    Neurologic                   dyskinesia or tremor           

        2010  

             

 

                    Gastrointestinal                   constipation             

      2010    

           

 

                    Gastrointestinal                   gastroesophageal reflux  

                 

2010                

 

                    Gastrointestinal                   gas and bloating         

          2010

               

 

                    Constitutional                   fatigue                   0

2010           

    

 

                    Constitutional                   fatigue                   0

2010           

    

 

                    Psychiatric                   anxiety                   2010              

 

 

                    Psychiatric                   depression                   0

2010           

    

 

                    Musculoskeletal                   muscle weakness           

        2010  

             

 

                    Gastrointestinal                   abdominal pain           

        2010  

             

 

                    Cardiovascular                   hypertension               

    2010      

         



                                                                    



Physical Exam

                      



                    Exam Name                   System Name                   It

em Name             

                    Status                   Result                   Effective 

Dates          

                                        Notes                

 

                    Full Exam - General                   Constitutional        

            general 

appearance                   Overall:                   well nourished          

                          2019                   None                

 

                    Full Exam - General                   Constitutional        

            general 

appearance                   Overall:                   in no acute distress    

                          2019                   None                

 

                    Full Exam - General                   Ears/Nose/Throat      

             

otoscopic exam                   Left tympanic membrane:                   air-

fluid level                   2019                   None                

 

                    Full Exam - General                   Ears/Nose/Throat      

             

otoscopic exam                   Right tympanic membrane:                   air-

fluid level                   2019                   None                

 

                    Full Exam - General                   Ears/Nose/Throat      

             oral 

cavity/pharynx/larynx                    Oropharynx:                   a normal 

exam                      2019                   None                

 

                    Full Exam - General                   Respiratory           

        respiratory 

effort/rhythm                   Overall:                   no retractions       

                          2019                   None                

 

                    Full Exam - General                   Respiratory           

        respiratory 

effort/rhythm                   Overall:                   normal rate          

                          2019                   None                

 

                    Full Exam - General                   Neurologic            

       mental status

                    Overall:                   alert                   

9 

                                        None                

 

                    Full Exam - General                   Neurologic            

       mental status

                    Overall:                   oriented                   

2019                              None                

 

                    Full Exam - General                   Constitutional        

            general 

appearance                   Overall:                   well nourished          

                          06/10/2019                   None                

 

                    Full Exam - General                   Constitutional        

            general 

appearance                   Overall:                   well developed          

                          06/10/2019                   None                

 

                    Full Exam - General                   Constitutional        

            general 

appearance                   Overall:                   in no acute distress    

                          06/10/2019                   None                

 

                    Full Exam - General                   Neurologic            

       mental status

                    Overall:                   alert                   06/10/201

9 

                                        None                

 

                    Full Exam - General                   Neurologic            

       mental status

                    Overall:                   oriented                   

06/10/2019                              None                

 

                    Full Exam - General                   Psychiatric           

        mood and 

affect                    Overall:                   normal mood and affect     

 

                          06/10/2019                   None                

 

                    Full Exam - General                   Respiratory           

        auscultation

                          Overall:                   breath sounds clear bilater

ally    

                          06/10/2019                   None                

 

                    Full Exam - General                   Cardiovascular        

           

auscultation of heart                   Overall:                   regular rate 

                          06/10/2019                   None                

 

                    Full Exam - General                   Cardiovascular        

           

auscultation of heart                   Overall:                   normal heart 

sounds                    06/10/2019                   None                

 

                    Full Exam - General                   Cardiovascular        

           

auscultation of heart                   S4 (atrial gallop):                   

present                   06/10/2019                   None                

 

                    Full Exam - General                   Cardiovascular        

           

auscultation of heart                   Murmur:                   previously 

known murmur unchanged                   06/10/2019                   None      

         

 

                    Full Exam - General                   Cardiovascular        

           

extremities                   Overall:                   no clubbing            

                          06/10/2019                   None                

 

                    Full Exam - General                   Cardiovascular        

           

extremities                   Overall:                   No cyanosis            

                          06/10/2019                   None                

 

                    Full Exam - General                   Cardiovascular        

           

extremities                   Overall:                   No edema               

                          06/10/2019                   None                

 

                    Full Exam - General                   Constitutional        

            general 

appearance                   Overall:                   well nourished          

                          2019                   None                

 

                    Full Exam - General                   Constitutional        

            general 

appearance                   Overall:                   well developed          

                          2019                   None                

 

                    Full Exam - General                   Constitutional        

            general 

appearance                   Overall:                   in no acute distress    

                          2019                   None                

 

                    Full Exam - General                   Neurologic            

       mental status

                    Overall:                   alert                   

9 

                                        None                

 

                    Full Exam - General                   Neurologic            

       mental status

                    Overall:                   oriented                   

2019                              None                

 

                    Full Exam - General                   Psychiatric           

        mood and 

affect                    Overall:                   normal mood and affect     

 

                          2019                   None                

 

                    Full Exam - General                   Abdomen               

    abdominal exam  

                    Overall:                   no masses                   

2019                              None                

 

                    Full Exam - General                   Abdomen               

    abdominal exam  

                          Overall:                   normal bowel sounds        

          

                          2019                   None                

 

                    Full Exam - General                   Abdomen               

    abdominal exam  

                    Overall:                   soft                   2019

    

                                        None                

 

                    Full Exam - General                   Respiratory           

        auscultation

                          Overall:                   breath sounds clear bilater

ally    

                          2019                   None                

 

                    Full Exam - General                   Cardiovascular        

           

auscultation of heart                   Overall:                   regular rate 

                          2019                   None                

 

                    Full Exam - General                   Cardiovascular        

           

auscultation of heart                   Overall:                   normal heart 

sounds                    2019                   None                

 

                    Full Exam - General                   Cardiovascular        

           

extremities                   Overall:                   no clubbing            

                          2019                   None                

 

                    Full Exam - General                   Cardiovascular        

           

extremities                   Overall:                   No edema               

                          2019                   None                

 

                    Full Exam - General                   Cardiovascular        

           

extremities                   Overall:                   No cyanosis            

                          2019                   None                

 

                    Full Exam - General                   Constitutional        

            general 

appearance                   Overall:                   well nourished          

                          2018                   None                

 

                    Full Exam - General                   Constitutional        

            general 

appearance                   Overall:                   well developed          

                          2018                   None                

 

                    Full Exam - General                   Constitutional        

            general 

appearance                   Overall:                   in no acute distress    

                          2018                   None                

 

                    Full Exam - General                   Neurologic            

       mental status

                    Overall:                   alert                   

8 

                                        None                

 

                    Full Exam - General                   Neurologic            

       mental status

                    Overall:                   oriented                   

2018                              None                

 

                    Full Exam - General                   Psychiatric           

        mood and 

affect                    Overall:                   normal mood and affect     

 

                          2018                   None                

 

                    Full Exam - General                   Abdomen               

    abdominal exam  

                    Overall:                   no masses                   

2018                              None                

 

                    Full Exam - General                   Abdomen               

    abdominal exam  

                          Overall:                   normal bowel sounds        

          

                          2018                   None                

 

                    Full Exam - General                   Abdomen               

    abdominal exam  

                    Overall:                   soft                   2018

    

                                        None                

 

                    Full Exam - General                   Abdomen               

    abdominal exam  

                          Epigastric:                   tender to palpation     

          

                          2018                   None                

 

                    Full Exam - General                   Abdomen               

    abdominal exam  

                          Left upper quadrant:                   tender to palpa

tion      

                          2018                   None                

 

                    Full Exam - General                   Respiratory           

        auscultation

                          Overall:                   breath sounds clear bilater

ally    

                          2018                   None                

 

                    Full Exam - General                   Cardiovascular        

           

auscultation of heart                   Overall:                   regular rate 

                          2018                   None                

 

                    Full Exam - General                   Cardiovascular        

           

auscultation of heart                   Overall:                   normal heart 

sounds                    2018                   None                

 

                    Full Exam - General                   Cardiovascular        

           

auscultation of heart                   S4 (atrial gallop):                   

present                   2018                   None                

 

                    Full Exam - General                   Cardiovascular        

           

auscultation of heart                   Murmur:                   previously 

known murmur unchanged                   2018                   None      

         

 

                    Full Exam - General                   Constitutional        

            general 

appearance                   Overall:                   well nourished          

                          2018                   None                

 

                    Full Exam - General                   Constitutional        

            general 

appearance                   Overall:                   well developed          

                          2018                   None                

 

                    Full Exam - General                   Constitutional        

            general 

appearance                   Overall:                   in no acute distress    

                          2018                   None                

 

                    Full Exam - General                   Neurologic            

       mental status

                    Overall:                   alert                   

8 

                                        None                

 

                    Full Exam - General                   Neurologic            

       mental status

                    Overall:                   oriented                   

2018                              None                

 

                    Full Exam - General                   Psychiatric           

        mood and 

affect                    Overall:                   normal mood and affect     

 

                          2018                   None                

 

                    Full Exam - General                   Respiratory           

        auscultation

                          Overall:                   breath sounds clear bilater

ally    

                          2018                   None                

 

                    Full Exam - General                   Cardiovascular        

           

auscultation of heart                   Overall:                   normal heart 

sounds                    2018                   None                

 

                    Full Exam - General                   Cardiovascular        

           

auscultation of heart                   S4 (atrial gallop):                   

present                   2018                   None                

 

                    Full Exam - General                   Cardiovascular        

           

auscultation of heart                   Rate:                     bradycardia   

  

                          2018                   None                

 

                    Full Exam - General                   Cardiovascular        

           

extremities                   Overall:                   no clubbing            

                          2018                   None                

 

                    Full Exam - General                   Cardiovascular        

           

extremities                   Overall:                   No edema               

                          2018                   None                

 

                    Full Exam - General                   Cardiovascular        

           

extremities                   Overall:                   No cyanosis            

                          2018                   None                

 

                    Full Exam - General                   Neck                  

 inspection of neck 

                    Overall:                   normal size                   

2018                              None                

 

                    Full Exam - General                   Neck                  

 inspection of neck 

                    Overall:                   no masses                   

2018                              None                

 

                    Full Exam - General                   Abdomen               

    abdominal exam  

                    Overall:                   no masses                   

2018                              None                

 

                    Full Exam - General                   Abdomen               

    abdominal exam  

                          Overall:                   normal bowel sounds        

          

                          2018                   None                

 

                    Full Exam - General                   Abdomen               

    abdominal exam  

                    Overall:                   soft                   2018

    

                                        None                

 

                    Full Exam - General                   Abdomen               

    abdominal exam  

                          Epigastric:                   tender to palpation     

          

                          2018                   None                

 

                    Full Exam - General                   Constitutional        

            general 

appearance                   Overall:                   well nourished          

                          10/02/2018                   None                

 

                    Full Exam - General                   Constitutional        

            general 

appearance                   Overall:                   well developed          

                          10/02/2018                   None                

 

                    Full Exam - General                   Constitutional        

            general 

appearance                   Overall:                   in no acute distress    

                          10/02/2018                   None                

 

                    Full Exam - General                   Neurologic            

       mental status

                    Overall:                   alert                   10/02/201

8 

                                        None                

 

                    Full Exam - General                   Neurologic            

       mental status

                    Overall:                   oriented                   

10/02/2018                              None                

 

                    Full Exam - General                   Psychiatric           

        mood and 

affect                    Overall:                   normal mood and affect     

 

                          10/02/2018                   None                

 

                    Full Exam - General                   Abdomen               

    abdominal exam  

                    Overall:                   no masses                   

10/02/2018                              None                

 

                    Full Exam - General                   Abdomen               

    abdominal exam  

                          Overall:                   normal bowel sounds        

          

                          10/02/2018                   None                

 

                    Full Exam - General                   Abdomen               

    abdominal exam  

                    Overall:                   soft                   10/02/2018

    

                                        None                

 

                    Full Exam - General                   Abdomen               

    abdominal exam  

                          Epigastric:                   tender to palpation     

          

                          10/02/2018                   None                

 

                    Full Exam - General                   Respiratory           

        auscultation

                          Overall:                   breath sounds clear bilater

ally    

                          10/02/2018                   None                

 

                    Full Exam - General                   Cardiovascular        

           

auscultation of heart                   Overall:                   regular rate 

                          10/02/2018                   None                

 

                    Full Exam - General                   Cardiovascular        

           

auscultation of heart                   Overall:                   normal heart 

sounds                    10/02/2018                   None                

 

                    Full Exam - General                   Cardiovascular        

           

auscultation of heart                   Murmur:                   previously 

known murmur unchanged                   10/02/2018                   None      

         

 

                    Full Exam - General                   Cardiovascular        

           

auscultation of heart                   S4 (atrial gallop):                   

present                   10/02/2018                   None                

 

                    Full Exam - General                   Cardiovascular        

           

extremities                   Overall:                   no clubbing            

                          10/02/2018                   None                

 

                    Full Exam - General                   Cardiovascular        

           

extremities                   Overall:                   No cyanosis            

                          10/02/2018                   None                

 

                    Full Exam - General                   Cardiovascular        

           

extremities                   Edema present:                   non-pitting      

                          10/02/2018                   None                

 

                    Full Exam - General                   Constitutional        

            general 

appearance                   Overall:                   well nourished          

                          2018                   None                

 

                    Full Exam - General                   Constitutional        

            general 

appearance                   Overall:                   well developed          

                          2018                   None                

 

                    Full Exam - General                   Constitutional        

            general 

appearance                   Evidence of Distress:                   anxious    

                          2018                   None                

 

                    Full Exam - General                   Respiratory           

        auscultation

                          Overall:                   breath sounds clear bilater

ally    

                          2018                   None                

 

                    Full Exam - General                   Cardiovascular        

           

auscultation of heart                   Overall:                   regular rate 

                          2018                   None                

 

                    Full Exam - General                   Cardiovascular        

           

auscultation of heart                   Overall:                   normal heart 

sounds                    2018                   None                

 

                    Full Exam - General                   Cardiovascular        

           

auscultation of heart                   Murmur:                   previously 

known murmur unchanged                   2018                   None      

         

 

                    Full Exam - General                   Cardiovascular        

           

auscultation of heart                   S4 (atrial gallop):                   

present                   2018                   None                

 

                    Full Exam - General                   Cardiovascular        

           

extremities                   Overall:                   no clubbing            

                          2018                   None                

 

                    Full Exam - General                   Cardiovascular        

           

extremities                   Overall:                   No edema               

                          2018                   None                

 

                    Full Exam - General                   Cardiovascular        

           

extremities                   Overall:                   No cyanosis            

                          2018                   None                

 

                    Full Exam - General                   Neurologic            

       mental status

                    Overall:                   oriented                   

2018                              None                

 

                    Full Exam - General                   Neurologic            

       mental status

                    Overall:                   alert                   

8 

                                        None                

 

                    Full Exam - General                   Psychiatric           

        mood and 

affect                    Overall:                   normal mood and affect     

 

                          2018                   None                

 

                    Full Exam - General                   Abdomen               

    abdominal exam  

                    Overall:                   no masses                   

2018                              None                

 

                    Full Exam - General                   Abdomen               

    abdominal exam  

                          Overall:                   normal bowel sounds        

          

                          2018                   None                

 

                    Full Exam - General                   Abdomen               

    abdominal exam  

                    Overall:                   soft                   2018

    

                                        None                

 

                    Full Exam - General                   Abdomen               

    abdominal exam  

                          Epigastric:                   tender to palpation     

          

                          2018                   None                

 

                    Full Exam - General                   Constitutional        

            general 

appearance                   Evidence of Distress:                   tearful    

                          2018                   None                

 

                    Full Exam - General                   Constitutional        

            general 

appearance                   Overall:                   well nourished          

                          2018                   None                

 

                    Full Exam - General                   Constitutional        

            general 

appearance                   Overall:                   well developed          

                          2018                   None                

 

                    Full Exam - General                   Constitutional        

            general 

appearance                   Overall:                   in no acute distress    

                          2018                   None                

 

                    Full Exam - General                   Neurologic            

       mental status

                    Overall:                   alert                   

8 

                                        None                

 

                    Full Exam - General                   Neurologic            

       mental status

                    Overall:                   oriented                   

2018                              None                

 

                    Full Exam - General                   Respiratory           

        auscultation

                          Overall:                   breath sounds clear bilater

ally    

                          2018                   None                

 

                    Full Exam - General                   Cardiovascular        

           

auscultation of heart                   Overall:                   regular rate 

                          2018                   None                

 

                    Full Exam - General                   Cardiovascular        

           

auscultation of heart                   Overall:                   normal heart 

sounds                    2018                   None                

 

                    Full Exam - General                   Cardiovascular        

           

auscultation of heart                   S4 (atrial gallop):                   

present                   2018                   None                

 

                    Full Exam - General                   Cardiovascular        

           

auscultation of heart                   Murmur:                   previously 

known murmur unchanged                   2018                   None      

         

 

                    Full Exam - General                   Cardiovascular        

           

extremities                   Overall:                   no clubbing            

                          2018                   None                

 

                    Full Exam - General                   Cardiovascular        

           

extremities                   Overall:                   No edema               

                          2018                   None                

 

                    Full Exam - General                   Cardiovascular        

           

extremities                   Overall:                   No cyanosis            

                          2018                   None                

 

                    Full Exam - General                   Constitutional        

            general 

appearance                   Overall:                   well nourished          

                          2018                   None                

 

                    Full Exam - General                   Constitutional        

            general 

appearance                   Overall:                   in no acute distress    

                          2018                   None                

 

                    Full Exam - General                   Cardiovascular        

           

auscultation of heart                   Overall:                   regular rate 

                          2018                   None                

 

                    Full Exam - General                   Cardiovascular        

           

auscultation of heart                   Overall:                   no murmurs   

                          2018                   None                

 

                    Full Exam - General                   Respiratory           

        percussion  

                    Overall:                   benign percussion                

   

2018                              None                

 

                    Full Exam - General                   Respiratory           

        respiratory 

effort/rhythm                   Overall:                   no retractions       

                          2018                   None                

 

                    Full Exam - General                   Respiratory           

        respiratory 

effort/rhythm                   Overall:                   normal rate          

                          2018                   None                

 

                    Full Exam - General                   Respiratory           

        auscultation

                          Overall:                   breath sounds clear bilater

ally    

                          2018                   None                

 

                    Full Exam - General                   Ears/Nose/Throat      

             

otoscopic exam                   Overall:                   external auditory 

canals clear                   2018                   None                

 

                    Full Exam - General                   Ears/Nose/Throat      

             

otoscopic exam                   Overall:                   tympanic membranes 

clear                     2018                   None                

 

                    Full Exam - General                   Ears/Nose/Throat      

             oral 

cavity/pharynx/larynx                    Oropharynx:                   a normal 

exam                      2018                   None                

 

                    Full Exam - General                   Cardiovascular        

           

inspection of carotid pulses                   Overall:                   

strong, bilaterally equal, no bruits                   2018               

                                        None                

 

                    Full Exam - General                   Cardiovascular        

           palpation

of heart                   Overall:                   apical impulse location 

normal                    2018                   None                

 

                    Full Exam - General                   Cardiovascular        

           palpation

of heart                   Overall:                   no heave/lift             

                          2018                   None                

 

                    Full Exam - General                   Lymphatic             

      neck nodes    

                          Overall:                   anterior cervical chain aruna

ign         

                          2018                   None                

 

                    Full Exam - General                   Lymphatic             

      neck nodes    

                          Overall:                   posterior cervical chain be

nign        

                          2018                   None                

 

                    Full Exam - General                   Neurologic            

       deep tendon 

reflexes                   Overall:                   deep tendon reflexes 

intact                    2018                   None                

 

                    Full Exam - General                   Neurologic            

       mental status

                    Overall:                   alert                   

8 

                                        None                

 

                    Full Exam - General                   Neurologic            

       mental status

                    Overall:                   oriented                   

2018                              None                

 

                    Full Exam - General                   Neurologic            

       cranial 

nerves                    Overall:                   cranial nerves 1-12 intact 

 

                          2018                   None                

 

                    Full Exam - General                   Psychiatric           

        

orientation/consciousness                   Overall:                   oriented 

to person, place and time                   2018                   None   

            

 

                    Full Exam - General                   Psychiatric           

        

behavior/psychomotor activity                   Overall:                   no 

tics, normal psychomotor activity                   2018                  

                                        None                

 

                    Full Exam - General                   Psychiatric           

        mood and 

affect                    Overall:                   normal mood and affect     

 

                          2018                   None                

 

                    Full Exam - General                   Psychiatric           

        appearance  

                          Overall:                   well-groomed, good eye cont

act       

                          2018                   None                

 

                    Full Exam - General                   Psychiatric           

        speech      

                          Overall:                   normal quality, no aphasia 

              

                          2018                   None                

 

                    Full Exam - General                   Psychiatric           

        speech      

                          Overall:                   normal quality, quantity, r

ate           

                          2018                   None                

 

                    Full Exam - General                   Psychiatric           

        attention   

                          Overall:                   normal digit recall and num

debo 

repeating                   2018                   None                

 

                    Full Exam - General                   Constitutional        

            general 

appearance                   Overall:                   well nourished          

                          2018                   None                

 

                    Full Exam - General                   Constitutional        

            general 

appearance                   Overall:                   well developed          

                          2018                   None                

 

                    Full Exam - General                   Constitutional        

            general 

appearance                   Overall:                   in no acute distress    

                          2018                   None                

 

                    Full Exam - General                   Musculoskeletal       

            head and

neck                      Cervical Spine:                   a normal exam       

   

                          2018                   None                

 

                    Full Exam - General                   Musculoskeletal       

            head and

neck                   Head:                   atraumatic                   

2018                              None                

 

                    Full Exam - General                   Musculoskeletal       

            head and

neck                   Head:                   normocephalic                   

2018                              None                

 

                    Full Exam - General                   Musculoskeletal       

            head and

neck                      Overall:                   head atraumatic            

   

                          2018                   None                

 

                    Full Exam - General                   Musculoskeletal       

            head and

neck                      Overall:                   cervical spine benign      

   

                          2018                   None                

 

                    Full Exam - General                   Neurologic            

       mental status

                    Overall:                   alert                   

8 

                                        None                

 

                    Full Exam - General                   Neurologic            

       mental status

                    Overall:                   oriented                   

2018                              None                

 

                    Full Exam - General                   Psychiatric           

        mood and 

affect                    Overall:                   normal mood and affect     

 

                          2018                   None                

 

                    Full Exam - General                   Constitutional        

            general 

appearance                   Overall:                   well nourished          

                          2018                   None                

 

                    Full Exam - General                   Constitutional        

            general 

appearance                   Overall:                   well developed          

                          2018                   None                

 

                    Full Exam - General                   Constitutional        

            general 

appearance                   Overall:                   in no acute distress    

                          2018                   None                

 

                    Full Exam - General                   Neurologic            

       mental status

                    Overall:                   alert                   

8 

                                        None                

 

                    Full Exam - General                   Neurologic            

       mental status

                    Overall:                   oriented                   

2018                              None                

 

                    Full Exam - General                   Psychiatric           

        mood and 

affect                    Overall:                   normal mood and affect     

 

                          2018                   None                

 

                    Full Exam - General                   Respiratory           

        auscultation

                          Overall:                   breath sounds clear bilater

ally    

                          2018                   None                

 

                    Full Exam - General                   Cardiovascular        

           

auscultation of heart                   Overall:                   regular rate 

                          2018                   None                

 

                    Full Exam - General                   Cardiovascular        

           

auscultation of heart                   Overall:                   normal heart 

sounds                    2018                   None                

 

                    Full Exam - General                   Cardiovascular        

           

auscultation of heart                   S4 (atrial gallop):                   

present                   2018                   None                

 

                    Full Exam - General                   Cardiovascular        

           

auscultation of heart                   Murmur:                   previously 

known murmur unchanged                   2018                   None      

         

 

                    Full Exam - General                   Musculoskeletal       

            spine, 

ribs and pelvis                   Spine:                    tender @ cervical 

spine                     2018                   None                

 

                    Full Exam - General                   Constitutional        

            general 

appearance                   Overall:                   well nourished          

                          2018                   None                

 

                    Full Exam - General                   Constitutional        

            general 

appearance                   Overall:                   in no acute distress    

                          2018                   None                

 

                    Full Exam - General                   Cardiovascular        

           

auscultation of heart                   Overall:                   regular rate 

                          2018                   None                

 

                    Full Exam - General                   Respiratory           

        percussion  

                    Overall:                   benign percussion                

   

2018                              None                

 

                    Full Exam - General                   Respiratory           

        respiratory 

effort/rhythm                   Overall:                   no retractions       

                          2018                   None                

 

                    Full Exam - General                   Respiratory           

        respiratory 

effort/rhythm                   Overall:                   normal rate          

                          2018                   None                

 

                    Full Exam - General                   Lymphatic             

      other nodes   

                          Right auricular chain:                   tender       

           

                          2018                   None                

 

                    Full Exam - General                   Lymphatic             

      other nodes   

                    Left supraclavicular:                   tender              

     

2018                              None                

 

                    Full Exam - General                   Ears/Nose/Throat      

             

otoscopic exam                   Left tympanic membrane:                   air-

fluid level                   2018                   None                

 

                    Full Exam - General                   Ears/Nose/Throat      

             

otoscopic exam                   Right tympanic membrane:                   air-

fluid level                   2018                   None                

 

                    Full Exam - General                   Neurologic            

       mental status

                    Overall:                   alert                   

8 

                                        None                

 

                    Full Exam - General                   Neurologic            

       mental status

                    Overall:                   oriented                   

2018                              None                

 

                    Full Exam - General                   Constitutional        

            general 

appearance                   Overall:                   well nourished          

                          2018                   None                

 

                    Full Exam - General                   Constitutional        

            general 

appearance                   Overall:                   well developed          

                          2018                   None                

 

                    Full Exam - General                   Constitutional        

            general 

appearance                   Overall:                   in no acute distress    

                          2018                   None                

 

                    Full Exam - General                   Neurologic            

       mental status

                    Overall:                   alert                   

8 

                                        None                

 

                    Full Exam - General                   Neurologic            

       mental status

                    Overall:                   oriented                   

2018                              None                

 

                    Full Exam - General                   Psychiatric           

        mood and 

affect                    Overall:                   normal mood and affect     

 

                          2018                   None                

 

                    Full Exam - General                   Musculoskeletal       

            spine, 

ribs and pelvis                   Spine:                    tender @ lumbar spin

e

                          2018                   None                

 

                    Full Exam - General                   Abdomen               

    abdominal exam  

                    Overall:                   no masses                   

2018                              None                

 

                    Full Exam - General                   Abdomen               

    abdominal exam  

                          Overall:                   normal bowel sounds        

          

                          2018                   None                

 

                    Full Exam - General                   Abdomen               

    abdominal exam  

                    Overall:                   soft                   2018

    

                                        None                

 

                    Full Exam - General                   Abdomen               

    abdominal exam  

                          Left lower quadrant:                   tender to palpa

tion      

                          2018                   None                

 

                    Full Exam - General                   Constitutional        

            general 

appearance                   Overall:                   well nourished          

                          2017                   None                

 

                    Full Exam - General                   Constitutional        

            general 

appearance                   Overall:                   well developed          

                          2017                   None                

 

                    Full Exam - General                   Constitutional        

            general 

appearance                   Overall:                   in no acute distress    

                          2017                   None                

 

                    Full Exam - General                   Neurologic            

       mental status

                    Overall:                   alert                   201

7 

                                        None                

 

                    Full Exam - General                   Neurologic            

       mental status

                    Overall:                   oriented                   

2017                              None                

 

                    Full Exam - General                   Psychiatric           

        mood and 

affect                    Overall:                   normal mood and affect     

 

                          2017                   None                

 

                    Full Exam - General                   Musculoskeletal       

            gait and

station                   Station:                   kyphosis                   

2017                              None                

 

                    Full Exam - General                   Constitutional        

            general 

appearance                   Overall:                   well nourished          

                          2017                   None                

 

                    Full Exam - General                   Constitutional        

            general 

appearance                   Overall:                   well developed          

                          2017                   None                

 

                    Full Exam - General                   Constitutional        

            general 

appearance                   Overall:                   in no acute distress    

                          2017                   None                

 

                    Full Exam - General                   Constitutional        

            general 

appearance                   Assistive Device:                   cane           

                          2017                   None                

 

                    Full Exam - General                   Neurologic            

       mental status

                    Overall:                   alert                   

7 

                                        None                

 

                    Full Exam - General                   Neurologic            

       mental status

                    Overall:                   oriented                   

2017                              None                

 

                    Full Exam - General                   Psychiatric           

        mood and 

affect                    Overall:                   normal mood and affect     

 

                          2017                   None                

 

                    Full Exam - General                   Abdomen               

    abdominal exam  

                    Overall:                   no masses                   

2017                              None                

 

                    Full Exam - General                   Abdomen               

    abdominal exam  

                          Overall:                   normal bowel sounds        

          

                          2017                   None                

 

                    Full Exam - General                   Abdomen               

    abdominal exam  

                    Overall:                   soft                   2017

    

                                        None                

 

                    Full Exam - General                   Abdomen               

    abdominal exam  

                          Left lower quadrant:                   tender to palpa

tion      

                          2017                   None                

 

                    Full Exam - General                   Abdomen               

    abdominal exam  

                          Right lower quadrant:                   tender to palp

ation     

                          2017                   None                

 

                    Full Exam - General                   Musculoskeletal       

            spine, 

ribs and pelvis                   Spine:                    tender @ lumbar spin

e

                          2017                   left                 

 

                    Full Exam - General                   Respiratory           

        auscultation

                          Overall:                   breath sounds clear bilater

ally    

                          2017                   None                

 

                    Full Exam - General                   Cardiovascular        

           

auscultation of heart                   Overall:                   regular rate 

                          2017                   None                

 

                    Full Exam - General                   Cardiovascular        

           

auscultation of heart                   Overall:                   normal heart 

sounds                    2017                   None                

 

                    Full Exam - General                   Cardiovascular        

           

auscultation of heart                   S4 (atrial gallop):                   

present                   2017                   None                

 

                    Full Exam - General                   Constitutional        

            general 

appearance                   Overall:                   well nourished          

                          2017                   None                

 

                    Full Exam - General                   Constitutional        

            general 

appearance                   Overall:                   well developed          

                          2017                   None                

 

                    Full Exam - General                   Constitutional        

            general 

appearance                   Overall:                   in no acute distress    

                          2017                   None                

 

                    Full Exam - General                   Neurologic            

       mental status

                    Overall:                   alert                   

7 

                                        None                

 

                    Full Exam - General                   Neurologic            

       mental status

                    Overall:                   oriented                   

2017                              None                

 

                    Full Exam - General                   Psychiatric           

        mood and 

affect                    Overall:                   normal mood and affect     

 

                          2017                   None                

 

                    Full Exam - General                   Respiratory           

        auscultation

                          Overall:                   breath sounds clear bilater

ally    

                          2017                   None                

 

                    Full Exam - General                   Cardiovascular        

           

auscultation of heart                   Overall:                   regular rate 

                          2017                   None                

 

                    Full Exam - General                   Cardiovascular        

           

auscultation of heart                   Overall:                   normal heart 

sounds                    2017                   None                

 

                    Full Exam - General                   Cardiovascular        

           

auscultation of heart                   S4 (atrial gallop):                   

present                   2017                   None                

 

                    Full Exam - General                   Cardiovascular        

           

auscultation of heart                   Murmur:                   previously 

known murmur unchanged                   2017                   None      

         

 

                    Full Exam - General                   Cardiovascular        

           

extremities                   Overall:                   no clubbing            

                          2017                   None                

 

                    Full Exam - General                   Cardiovascular        

           

extremities                   Overall:                   No cyanosis            

                          2017                   None                

 

                    Full Exam - General                   Cardiovascular        

           

extremities                   Edema present:                   non-pitting      

                          2017                   None                

 

                    Full Exam - General                   Neck                  

 inspection of neck 

                    Overall:                   normal size                   

2017                              None                

 

                    Full Exam - General                   Neck                  

 inspection of neck 

                    Overall:                   no masses                   

2017                              None                

 

                    Full Exam - General                   Chest/Breast          

         breast and 

axillae palpation                   Overall:                   no masses        

                          2017                   None                

 

                    Full Exam - General                   Chest/Breast          

         breast and 

axillae palpation                   Overall:                   axillae non-

tender                    2017                   None                

 

                    Full Exam - General                   Chest/Breast          

         breast and 

axillae palpation                   Overall:                   no nipple 

discharge                   2017                   None                

 

                    Full Exam - General                   Chest/Breast          

         breast and 

axillae palpation                       Left upper inner quadrant:              

    

                    tender                   2017                   None  

              

 

                    Full Exam - General                   Chest/Breast          

         breast and 

axillae palpation                       Left upper outer quadrant:              

    

                    tender                   2017                   None  

              

 

                    Full Exam - General                   Chest/Breast          

         breast and 

axillae palpation                       Left lower inner quadrant:              

    

                    tender                   2017                   None  

              

 

                    Full Exam - General                   Chest/Breast          

         breast and 

axillae palpation                       Left lower outer quadrant:              

    

                    tender                   2017                   None  

              

 

                    Full Exam - General                   Chest/Breast          

         breast and 

axillae palpation                       Right upper inner quadrant:             

    

                    tender                   2017                   None  

              

 

                    Full Exam - General                   Chest/Breast          

         breast and 

axillae palpation                       Right upper outer quadrant:             

    

                    tender                   2017                   None  

              

 

                    Full Exam - General                   Chest/Breast          

         breast and 

axillae palpation                       Right lower inner quadrant:             

    

                    tender                   2017                   None  

              

 

                    Full Exam - General                   Chest/Breast          

         breast and 

axillae palpation                       Right lower outer quadrant:             

    

                    tender                   2017                   None  

              

 

                    Full Exam - General                   Musculoskeletal       

            gait and

station                   Station:                   kyphosis                   

2017                              None                

 

                    Full Exam - General                   Musculoskeletal       

            spine, 

ribs and pelvis                   Chest wall palpation:                   chest 

wall pain                   2017                   bilateral costochondral

junction                

 

                    Full Exam - General                   Constitutional        

            general 

appearance                   Overall:                   well nourished          

                          2017                   None                

 

                    Full Exam - General                   Constitutional        

            general 

appearance                   Overall:                   well developed          

                          2017                   None                

 

                    Full Exam - General                   Constitutional        

            general 

appearance                   Overall:                   in no acute distress    

                          2017                   None                

 

                    Full Exam - General                   Constitutional        

            general 

appearance                   Nourishment:                   well nourished      

                          2017                   None                

 

                    Full Exam - General                   Constitutional        

            general 

appearance                   Evidence of Distress:                   in no acute

distress                   2017                   None                

 

                    Full Exam - General                   Eyes                  

 conjunctiva/eyelids

                          Overall:                   conjunctiva clear          

        

                          2017                   None                

 

                    Full Exam - General                   Eyes                  

 conjunctiva/eyelids

                    Overall:                   cornea clear                   

2017                              None                

 

                    Full Exam - General                   Eyes                  

 conjunctiva/eyelids

                    Overall:                   eyelids normal                   

2017                              None                

 

                    Full Exam - General                   Eyes                  

 pupils and irises  

                          Overall:                   pupils equal, round, reacti

ve to 

light and accomodation                   2017                   None      

         

 

                    Full Exam - General                   Ears/Nose/Throat      

             

external ear                   Overall:                   normal appearance     

                          2017                   None                

 

                    Full Exam - General                   Ears/Nose/Throat      

             

external nose                   Overall:                   benign appearance    

                          2017                   None                

 

                    Full Exam - General                   Ears/Nose/Throat      

             

otoscopic exam                   Overall:                   external auditory 

canals clear                   2017                   None                

 

                    Full Exam - General                   Ears/Nose/Throat      

             

otoscopic exam                   Left tympanic membrane:                   air-

fluid level                   2017                   None                

 

                    Full Exam - General                   Ears/Nose/Throat      

             

otoscopic exam                   Right tympanic membrane:                   air-

fluid level                   2017                   None                

 

                    Full Exam - General                   Ears/Nose/Throat      

             

internal nose                   Left nasal cavity:                   mucosal 

edema                     2017                   None                

 

                    Full Exam - General                   Ears/Nose/Throat      

             

internal nose                   Right nasal cavity:                   mucosal 

edema                     2017                   None                

 

                    Full Exam - General                   Ears/Nose/Throat      

             

lips/teeth/gingiva                   Overall:                   benign lips     

                          2017                   None                

 

                    Full Exam - General                   Ears/Nose/Throat      

             

lips/teeth/gingiva                   Overall:                   normal dentition

                          2017                   None                

 

                    Full Exam - General                   Ears/Nose/Throat      

             oral 

cavity/pharynx/larynx                    Oropharynx:                   a normal 

exam                      2017                   None                

 

                    Full Exam - General                   Respiratory           

        auscultation

                          Overall:                   breath sounds clear bilater

ally    

                          2017                   None                

 

                    Full Exam - General                   Cardiovascular        

           

auscultation of heart                   Overall:                   regular rate 

                          2017                   None                

 

                    Full Exam - General                   Cardiovascular        

           

auscultation of heart                   Overall:                   normal heart 

sounds                    2017                   None                

 

                    Full Exam - General                   Cardiovascular        

           

auscultation of heart                   Overall:                   no murmurs   

                          2017                   None                

 

                    Full Exam - General                   Integument            

       inspection of

skin                      Overall:                   no rash, lesions           

   

                          2017                   None                

 

                    Full Exam - General                   Psychiatric           

        mood and 

affect                    Overall:                   normal mood and affect     

 

                          2017                   None                

 

                    Full Exam - General                   Ears/Nose/Throat      

             

otoscopic exam                   Otorrhea:                   absent             

                          2017                   None                

 

                    Full Exam - General                   Ears/Nose/Throat      

             

otoscopic exam                   Perforation:                   absent          

                          2017                   None                

 

                    Full Exam - General                   Ears/Nose/Throat      

             

internal nose                   Overall:                   no sinus tenderness  

                          2017                   None                

 

                    Full Exam - General                   Ears/Nose/Throat      

             

internal nose                   Overall:                   no drainage          

                          2017                   None                

 

                    Full Exam - General                   Ears/Nose/Throat      

             oral 

cavity/pharynx/larynx                    Overall:                   oral mucosa 

clear                     2017                   None                

 

                    Full Exam - General                   Respiratory           

        respiratory 

effort/rhythm                   Overall:                   no retractions       

                          2017                   None                

 

                    Full Exam - General                   Respiratory           

        respiratory 

effort/rhythm                   Overall:                   normal rate          

                          2017                   None                

 

                    Full Exam - General                   Lymphatic             

      neck nodes    

                          Overall:                   shotty lymphadenopathy     

            

                          2017                   tender but no enlargement

 palpable                

 

                    Full Exam - General                   Constitutional        

            general 

appearance                   Overall:                   well nourished          

                          2016                   None                

 

                    Full Exam - General                   Constitutional        

            general 

appearance                   Overall:                   well developed          

                          2016                   None                

 

                    Full Exam - General                   Constitutional        

            general 

appearance                   Overall:                   in no acute distress    

                          2016                   None                

 

                    Full Exam - General                   Ears/Nose/Throat      

             

otoscopic exam                   Overall:                   external auditory 

canals clear                   2016                   None                

 

                    Full Exam - General                   Ears/Nose/Throat      

             

internal nose                   Drainage:                   clear               

                          2016                   None                

 

                    Full Exam - General                   Ears/Nose/Throat      

             oral 

cavity/pharynx/larynx                    Oropharynx:                   postnasal

drainage                   2016                   None                

 

                    Full Exam - General                   Respiratory           

        auscultation

                          Overall:                   breath sounds clear bilater

ally    

                          2016                   None                

 

                    Full Exam - General                   Cardiovascular        

           

auscultation of heart                   Overall:                   regular rate 

                          2016                   None                

 

                    Full Exam - General                   Cardiovascular        

           

auscultation of heart                   Overall:                   normal heart 

sounds                    2016                   None                

 

                    Full Exam - General                   Cardiovascular        

           

auscultation of heart                   Overall:                   no murmurs   

                          2016                   None                

 

                    Full Exam - General                   Neurologic            

       mental status

                    Overall:                   alert                   

6 

                                        None                

 

                    Full Exam - General                   Neurologic            

       mental status

                    Overall:                   oriented                   

2016                              None                

 

                    Full Exam - General                   Psychiatric           

        mood and 

affect                    Overall:                   normal mood and affect     

 

                          2016                   None                

 

                    Full Exam - General                   Constitutional        

            general 

appearance                   Overall:                   well nourished          

                          2016                   None                

 

                    Full Exam - General                   Constitutional        

            general 

appearance                   Overall:                   well developed          

                          2016                   None                

 

                    Full Exam - General                   Constitutional        

            general 

appearance                   Overall:                   in no acute distress    

                          2016                   None                

 

                    Full Exam - General                   Neurologic            

       mental status

                    Overall:                   alert                   

6 

                                        None                

 

                    Full Exam - General                   Neurologic            

       mental status

                    Overall:                   oriented                   

2016                              None                

 

                    Full Exam - General                   Psychiatric           

        mood and 

affect                    Overall:                   normal mood and affect     

 

                          2016                   None                

 

                    Full Exam - General                   Abdomen               

    abdominal exam  

                    Overall:                   no masses                   

2016                              None                

 

                    Full Exam - General                   Abdomen               

    abdominal exam  

                          Overall:                   normal bowel sounds        

          

                          2016                   None                

 

                    Full Exam - General                   Abdomen               

    abdominal exam  

                    Overall:                   soft                   2016

    

                                        None                

 

                    Full Exam - General                   Abdomen               

    abdominal exam  

                    Overall:                   no tenderness                   

2016                              None                

 

                    Full Exam - General                   Constitutional        

            general 

appearance                   Overall:                   well nourished          

                          2016                   None                

 

                    Full Exam - General                   Constitutional        

            general 

appearance                   Overall:                   well developed          

                          2016                   None                

 

                    Full Exam - General                   Constitutional        

            general 

appearance                   Overall:                   in no acute distress    

                          2016                   None                

 

                    Full Exam - General                   Neurologic            

       mental status

                    Overall:                   alert                   

6 

                                        None                

 

                    Full Exam - General                   Neurologic            

       mental status

                    Overall:                   oriented                   

2016                              None                

 

                    Full Exam - General                   Psychiatric           

        mood and 

affect                    Overall:                   normal mood and affect     

 

                          2016                   None                

 

                    Full Exam - General                   Respiratory           

        auscultation

                          Overall:                   breath sounds clear bilater

ally    

                          2016                   None                

 

                    Full Exam - General                   Cardiovascular        

           

auscultation of heart                   Overall:                   regular rate 

                          2016                   None                

 

                    Full Exam - General                   Cardiovascular        

           

auscultation of heart                   Overall:                   normal heart 

sounds                    2016                   None                

 

                    Full Exam - General                   Cardiovascular        

           

auscultation of heart                   S3 (ventricular gallop):                

                    present                   2016                   None 

               

 

                    Full Exam - General                   Cardiovascular        

           

auscultation of heart                   Murmur:                   previously 

known murmur unchanged                   2016                   None      

         

 

                    Full Exam - General                   Cardiovascular        

           

extremities                   Overall:                   no clubbing            

                          2016                   None                

 

                    Full Exam - General                   Cardiovascular        

           

extremities                   Overall:                   No cyanosis            

                          2016                   None                

 

                    Full Exam - General                   Cardiovascular        

           

extremities                   Overall:                   No edema               

                          2016                   None                

 

                    Full Exam - General                   Constitutional        

            general 

appearance                   Overall:                   well nourished          

                          2015                   None                

 

                    Full Exam - General                   Constitutional        

            general 

appearance                   Overall:                   well developed          

                          2015                   None                

 

                    Full Exam - General                   Constitutional        

            general 

appearance                   Overall:                   in no acute distress    

                          2015                   None                

 

                    Full Exam - General                   Ears/Nose/Throat      

             

otoscopic exam                   Overall:                   external auditory 

canals clear                   2015                   None                

 

                    Full Exam - General                   Ears/Nose/Throat      

             

internal nose                   Drainage:                   clear               

                          2015                   None                

 

                    Full Exam - General                   Ears/Nose/Throat      

             oral 

cavity/pharynx/larynx                    Oropharynx:                   postnasal

drainage                   2015                   None                

 

                    Full Exam - General                   Respiratory           

        auscultation

                          Overall:                   breath sounds clear bilater

ally    

                          2015                   None                

 

                    Full Exam - General                   Cardiovascular        

           

auscultation of heart                   Overall:                   regular rate 

                          2015                   None                

 

                    Full Exam - General                   Cardiovascular        

           

auscultation of heart                   Overall:                   normal heart 

sounds                    2015                   None                

 

                    Full Exam - General                   Cardiovascular        

           

auscultation of heart                   Overall:                   no murmurs   

                          2015                   None                

 

                    Full Exam - General                   Neurologic            

       mental status

                    Overall:                   alert                   

5 

                                        None                

 

                    Full Exam - General                   Neurologic            

       mental status

                    Overall:                   oriented                   

2015                              None                

 

                    Full Exam - General                   Psychiatric           

        mood and 

affect                    Overall:                   normal mood and affect     

 

                          2015                   None                

 

                    Full Exam - General                   Abdomen               

    abdominal exam  

                    Overall:                   no masses                   

2015                              None                

 

                    Full Exam - General                   Abdomen               

    abdominal exam  

                    Overall:                   no tenderness                   

2015                              None                

 

                    Full Exam - General                   Abdomen               

    abdominal exam  

                          Overall:                   normal bowel sounds        

          

                          2015                   None                

 

                    Full Exam - General                   Abdomen               

    abdominal exam  

                    Overall:                   soft                   2015

    

                                        None                

 

                    Full Exam - General                   Musculoskeletal       

            gait and

station                   Overall:                   normal gait                

                          2015                   None                

 

                    Full Exam - General                   Musculoskeletal       

            gait and

station                   Station:                   kyphosis                   

2015                              None                

 

                    Full Exam - General                   Musculoskeletal       

            spine, 

ribs and pelvis                   Spine:                    tender @ thoracic 

spine                     2015                   None                

 

                    Full Exam - General                   Musculoskeletal       

            spine, 

ribs and pelvis                   Spine:                    tender @ lumbar spin

e

                          2015                   None                

 

                    Full Exam - General                   Ears/Nose/Throat      

             

otoscopic exam                   Left tympanic membrane:                   a 

normal exam                   2015                   None                

 

                    Full Exam - General                   Ears/Nose/Throat      

             

otoscopic exam                   Right tympanic membrane:                   a 

normal exam                   2015                   None                

 

                    Full Exam - General                   Constitutional        

            general 

appearance                   Overall:                   well nourished          

                          2015                   None                

 

                    Full Exam - General                   Constitutional        

            general 

appearance                   Overall:                   well developed          

                          2015                   None                

 

                    Full Exam - General                   Constitutional        

            general 

appearance                   Evidence of Distress:                   tearful    

                          2015                   None                

 

                    Full Exam - General                   Neurologic            

       mental status

                    Overall:                   alert                   

5 

                                        None                

 

                    Full Exam - General                   Neurologic            

       mental status

                    Overall:                   oriented                   

2015                              None                

 

                    Full Exam - General                   Psychiatric           

        mood and 

affect                   Mood:                   depressed                   

2015                              tearful                

 

                    Full Exam - General                   Respiratory           

        auscultation

                          Overall:                   breath sounds clear bilater

ally    

                          2015                   None                

 

                    Full Exam - General                   Cardiovascular        

           

auscultation of heart                   Overall:                   regular rate 

                          2015                   None                

 

                    Full Exam - General                   Cardiovascular        

           

auscultation of heart                   Overall:                   normal heart 

sounds                    2015                   None                

 

                    Full Exam - General                   Cardiovascular        

           

auscultation of heart                   S3 (ventricular gallop):                

                    present                   2015                   None 

               

 

                    Full Exam - General                   Cardiovascular        

           

auscultation of heart                   Murmur:                   previously 

known murmur unchanged                   2015                   None      

         

 

                    Full Exam - General                   Cardiovascular        

           

extremities                   Overall:                   no clubbing            

                          2015                   None                

 

                    Full Exam - General                   Cardiovascular        

           

extremities                   Overall:                   No edema               

                          2015                   None                

 

                    Full Exam - General                   Cardiovascular        

           

extremities                   Overall:                   No cyanosis            

                          2015                   None                

 

                    Full Exam - General                   Constitutional        

            general 

appearance                   Overall:                   well nourished          

                          2015                   None                

 

                    Full Exam - General                   Constitutional        

            general 

appearance                   Overall:                   well developed          

                          2015                   None                

 

                    Full Exam - General                   Constitutional        

            general 

appearance                   Overall:                   in no acute distress    

                          2015                   None                

 

                    Full Exam - General                   Ears/Nose/Throat      

             oral 

cavity/pharynx/larynx                    Overall:                   oral mucosa 

clear                     2015                   None                

 

                    Full Exam - General                   Ears/Nose/Throat      

             

otoscopic exam                          Left external auditory canal:           

       

                    complete cerumen impaction                   2015     

              None  

             

 

                    Full Exam - General                   Ears/Nose/Throat      

             

otoscopic exam                          Right external auditory canal:          

       

                    complete cerumen impaction                   2015     

              None 

              

 

                    Full Exam - General                   Ears/Nose/Throat      

             

otoscopic exam                   Right tympanic membrane:                   

abnormal light reflex                   2015                   None       

        

 

                    Full Exam - General                   Ears/Nose/Throat      

             

otoscopic exam                   Right tympanic membrane:                   air-

fluid level                   2015                   secondary to cerumen 

removal                 

 

                    Full Exam - General                   Respiratory           

        auscultation

                          Overall:                   breath sounds clear bilater

ally    

                          2015                   None                

 

                    Full Exam - General                   Neurologic            

       mental status

                    Overall:                   alert                   

5 

                                        None                

 

                    Full Exam - General                   Neurologic            

       mental status

                    Overall:                   oriented                   

2015                              None                

 

                    Full Exam - General                   Psychiatric           

        mood and 

affect                    Overall:                   normal mood and affect     

 

                          2015                   None                

 

                    Full Exam - General                   Ears/Nose/Throat      

             

otoscopic exam                   Left tympanic membrane:                   

abnormal light reflex                   2015                   None       

        

 

                    Full Exam - General                   Cardiovascular        

           

auscultation of heart                   S3 (ventricular gallop):                

                    present                   2015                   None 

               

 

                    Full Exam - General                   Cardiovascular        

           

auscultation of heart                   Overall:                   regular rate 

                          2015                   None                

 

                    Full Exam - General                   Constitutional        

            general 

appearance                   Overall:                   well nourished          

                          2015                   None                

 

                    Full Exam - General                   Constitutional        

            general 

appearance                   Overall:                   well developed          

                          2015                   None                

 

                    Full Exam - General                   Constitutional        

            general 

appearance                   Overall:                   in no acute distress    

                          2015                   None                

 

                    Full Exam - General                   Neurologic            

       mental status

                    Overall:                   oriented                   

2015                              None                

 

                    Full Exam - General                   Neurologic            

       mental status

                    Overall:                   alert                   

5 

                                        None                

 

                    Full Exam - General                   Psychiatric           

        mood and 

affect                    Overall:                   normal mood and affect     

 

                          2015                   None                

 

                    Full Exam - General                   Musculoskeletal       

            right 

lower extremity                         Inspection - right lower leg:           

      

                    swelling                   2015                   mini

mal                

 

                    Full Exam - General                   Musculoskeletal       

            right 

lower extremity                         Palpation - right lower leg:            

      

                    tender                   2015                   medial

ly                

 

                    Full Exam - General                   Constitutional        

            general 

appearance                   Overall:                   well nourished          

                          2015                   None                

 

                    Full Exam - General                   Constitutional        

            general 

appearance                   Overall:                   well developed          

                          2015                   None                

 

                    Full Exam - General                   Constitutional        

            general 

appearance                   Overall:                   in no acute distress    

                          2015                   None                

 

                    Full Exam - General                   Neurologic            

       mental status

                    Overall:                   alert                   

5 

                                        None                

 

                    Full Exam - General                   Neurologic            

       mental status

                    Overall:                   oriented                   

2015                              None                

 

                    Full Exam - General                   Psychiatric           

        mood and 

affect                    Overall:                   normal mood and affect     

 

                          2015                   None                

 

                    Full Exam - General                   Respiratory           

        auscultation

                          Overall:                   breath sounds clear bilater

ally    

                          2015                   None                

 

                    Full Exam - General                   Cardiovascular        

           

auscultation of heart                   Overall:                   regular rate 

                          2015                   None                

 

                    Full Exam - General                   Cardiovascular        

           

auscultation of heart                   Overall:                   normal heart 

sounds                    2015                   None                

 

                    Full Exam - General                   Cardiovascular        

           

auscultation of heart                   S3 (ventricular gallop):                

                    present                   2015                   None 

               

 

                    Full Exam - General                   Cardiovascular        

           

auscultation of heart                   Murmur:                   previously 

known murmur unchanged                   2015                   None      

         

 

                    Full Exam - General                   Musculoskeletal       

            spine, 

ribs and pelvis                   Spine:                    tender @ thoracic 

spine                     2015                   None                

 

                    Full Exam - General                   Constitutional        

            general 

appearance                   Overall:                   well nourished          

                          2015                   None                

 

                    Full Exam - General                   Constitutional        

            general 

appearance                   Overall:                   well developed          

                          2015                   None                

 

                    Full Exam - General                   Constitutional        

            general 

appearance                   Overall:                   in no acute distress    

                          2015                   None                

 

                    Full Exam - General                   Neurologic            

       mental status

                    Overall:                   alert                   

5 

                                        None                

 

                    Full Exam - General                   Neurologic            

       mental status

                    Overall:                   oriented                   

2015                              None                

 

                    Full Exam - General                   Psychiatric           

        mood and 

affect                    Overall:                   normal mood and affect     

 

                          2015                   None                

 

                    Full Exam - General                   Respiratory           

        auscultation

                          Overall:                   breath sounds clear bilater

ally    

                          2015                   None                

 

                    Full Exam - General                   Cardiovascular        

           

auscultation of heart                   Overall:                   regular rate 

                          2015                   None                

 

                    Full Exam - General                   Cardiovascular        

           

auscultation of heart                   Overall:                   normal heart 

sounds                    2015                   None                

 

                    Full Exam - General                   Cardiovascular        

           

auscultation of heart                   S3 (ventricular gallop):                

                    present                   2015                   None 

               

 

                    Full Exam - General                   Cardiovascular        

           

auscultation of heart                   Murmur:                   previously 

known murmur unchanged                   2015                   None      

         

 

                    Full Exam - General                   Cardiovascular        

           

extremities                   Overall:                   no clubbing            

                          2015                   None                

 

                    Full Exam - General                   Cardiovascular        

           

extremities                   Overall:                   No edema               

                          2015                   None                

 

                    Full Exam - General                   Cardiovascular        

           

extremities                   Overall:                   No cyanosis            

                          2015                   None                

 

                    Full Exam - General                   Abdomen               

    abdominal exam  

                    Overall:                   no masses                   

2015                              None                

 

                    Full Exam - General                   Abdomen               

    abdominal exam  

                    Overall:                   no tenderness                   

2015                              None                

 

                    Full Exam - General                   Abdomen               

    abdominal exam  

                          Overall:                   normal bowel sounds        

          

                          2015                   None                

 

                    Full Exam - General                   Abdomen               

    abdominal exam  

                    Overall:                   soft                   2015

    

                                        None                

 

                    Full Exam - General                   Integument            

       inspection of

skin                   Location:                   right arm                   

2015                              upper posterior with irregular nevus    

           



 

                    Full Exam - General                   Ears/Nose/Throat      

             

internal nose                   Turbinates:                   erythema          

                          2014                   None                

 

                    Full Exam - General                   Ears/Nose/Throat      

             oral 

cavity/pharynx/larynx                    Oropharynx:                   postnasal

drainage                   2014                   None                

 

                    Full Exam - General                   Ears/Nose/Throat      

             oral 

cavity/pharynx/larynx                    Oropharynx:                   erythema 

                          2014                   None                

 

                    Full Exam - General                   Constitutional        

            general 

appearance                   Overall:                   well nourished          

                          2014                   None                

 

                    Full Exam - General                   Constitutional        

            general 

appearance                   Overall:                   in no acute distress    

                          2014                   None                

 

                    Full Exam - General                   Respiratory           

        auscultation

                          Overall:                   breath sounds clear bilater

ally    

                          2014                   None                

 

                    Full Exam - General                   Cardiovascular        

           

auscultation of heart                   Overall:                   regular rate 

                          2014                   None                

 

                    Full Exam - General                   Cardiovascular        

           

auscultation of heart                   Overall:                   normal heart 

sounds                    2014                   None                

 

                    Full Exam - General                   Cardiovascular        

           

auscultation of heart                   Overall:                   no murmurs   

                          2014                   None                

 

                    Full Exam - General                   Psychiatric           

        

orientation/consciousness                   Overall:                   oriented 

to person, place and time                   2014                   None   

            

 

                    Full Exam - General                   Ears/Nose/Throat      

             

otoscopic exam                   Right tympanic membrane:                   

effusion                   2014                   None                

 

                    Full Exam - General                   Ears/Nose/Throat      

             

otoscopic exam                   Left tympanic membrane:                   a 

normal exam                   2014                   None                

 

                    Full Exam - General                   Ears/Nose/Throat      

             

otoscopic exam                   Right tympanic membrane:                   loss

of landmarks                   2014                   None                

 

                    Full Exam - General                   Constitutional        

            general 

appearance                   Overall:                   well nourished          

                          2014                   None                

 

                    Full Exam - General                   Constitutional        

            general 

appearance                   Overall:                   well developed          

                          2014                   None                

 

                    Full Exam - General                   Constitutional        

            general 

appearance                   Overall:                   in no acute distress    

                          2014                   None                

 

                    Full Exam - General                   Neurologic            

       mental status

                    Overall:                   alert                   

4 

                                        None                

 

                    Full Exam - General                   Neurologic            

       mental status

                    Overall:                   oriented                   

2014                              None                

 

                    Full Exam - General                   Psychiatric           

        mood and 

affect                    Overall:                   normal mood and affect     

 

                          2014                   None                

 

                    Full Exam - General                   Respiratory           

        auscultation

                          Overall:                   breath sounds clear bilater

ally    

                          2014                   None                

 

                    Full Exam - General                   Cardiovascular        

           

auscultation of heart                   Overall:                   regular rate 

                          2014                   None                

 

                    Full Exam - General                   Cardiovascular        

           

auscultation of heart                   Overall:                   normal heart 

sounds                    2014                   None                

 

                    Full Exam - General                   Cardiovascular        

           

auscultation of heart                   S3 (ventricular gallop):                

                    present                   2014                   None 

               

 

                    Full Exam - General                   Cardiovascular        

           

auscultation of heart                   Murmur:                   previously 

known murmur unchanged                   2014                   None      

         

 

                    Full Exam - General                   Cardiovascular        

           

extremities                   Overall:                   no clubbing            

                          2014                   None                

 

                    Full Exam - General                   Cardiovascular        

           

extremities                   Overall:                   No cyanosis            

                          2014                   None                

 

                    Full Exam - General                   Cardiovascular        

           

extremities                   Edema present:                   non-pitting      

                          2014                   None                

 

                    Full Exam - General                   Constitutional        

            general 

appearance                   Overall:                   well nourished          

                          2014                   None                

 

                    Full Exam - General                   Constitutional        

            general 

appearance                   Overall:                   well developed          

                          2014                   None                

 

                    Full Exam - General                   Constitutional        

            general 

appearance                   Overall:                   in no acute distress    

                          2014                   None                

 

                    Full Exam - General                   Neurologic            

       mental status

                    Overall:                   alert                   

4 

                                        None                

 

                    Full Exam - General                   Neurologic            

       mental status

                    Overall:                   oriented                   

2014                              None                

 

                    Full Exam - General                   Psychiatric           

        mood and 

affect                    Overall:                   normal mood and affect     

 

                          2014                   None                

 

                    Full Exam - General                   Ears/Nose/Throat      

             

otoscopic exam                   Overall:                   external auditory 

canals clear                   2014                   None                

 

                    Full Exam - General                   Ears/Nose/Throat      

             

otoscopic exam                   Overall:                   tympanic membranes 

clear                     2014                   None                

 

                    Full Exam - General                   Ears/Nose/Throat      

             

internal nose                   Turbinates:                   hypertrophy       

                          2014                   None                

 

                    Full Exam - General                   Ears/Nose/Throat      

             oral 

cavity/pharynx/larynx                    Overall:                   oral mucosa 

clear                     2014                   None                

 

                    Full Exam - General                   Respiratory           

        auscultation

                          Overall:                   breath sounds clear bilater

ally    

                          2014                   None                

 

                    Full Exam - General                   Cardiovascular        

           

auscultation of heart                   Overall:                   regular rate 

                          2014                   None                

 

                    Full Exam - General                   Cardiovascular        

           

auscultation of heart                   Overall:                   normal heart 

sounds                    2014                   None                

 

                    Full Exam - General                   Cardiovascular        

           

auscultation of heart                   S3 (ventricular gallop):                

                    present                   2014                   None 

               

 

                    Full Exam - General                   Cardiovascular        

           

auscultation of heart                   Murmur:                   previously 

known murmur unchanged                   2014                   None      

         

 

                    Full Exam - General                   Constitutional        

            general 

appearance                   Nourishment:                   obese               

                          2014                   None                

 

                    Full Exam - General                   Constitutional        

            general 

appearance                   Overall:                   well developed          

                          2014                   None                

 

                    Full Exam - General                   Constitutional        

            general 

appearance                   Overall:                   in no acute distress    

                          2014                   None                

 

                    Full Exam - General                   Ears/Nose/Throat      

             

external ear                   Overall:                   normal appearance     

                          2014                   None                

 

                    Full Exam - General                   Ears/Nose/Throat      

             

otoscopic exam                   Overall:                   tympanic membranes 

clear                     2014                   None                

 

                    Full Exam - General                   Ears/Nose/Throat      

             

otoscopic exam                   Overall:                   external auditory 

canals clear                   2014                   None                

 

                    Full Exam - General                   Ears/Nose/Throat      

             

otoscopic exam                          Right external auditory canal:          

       

                    partial cerumen occlusion                   2014      

             None  

             

 

                    Full Exam - General                   Ears/Nose/Throat      

             

otoscopic exam                   Right tympanic membrane:                   not 

visualized                   2014                   None                

 

                    Full Exam - General                   Ears/Nose/Throat      

             

otoscopic exam                   Left tympanic membrane:                   a 

normal exam                   2014                   None                

 

                    Full Exam - General                   Respiratory           

        auscultation

                          Overall:                   breath sounds clear bilater

ally    

                          2014                   None                

 

                    Full Exam - General                   Cardiovascular        

           

auscultation of heart                   Overall:                   normal heart 

sounds                    2014                   None                

 

                    Full Exam - General                   Cardiovascular        

           

auscultation of heart                   Overall:                   regular rate 

                          2014                   None                

 

                    Full Exam - General                   Psychiatric           

        

orientation/consciousness                   Overall:                   oriented 

to person, place and time                   2014                   None   

            

 

                    Full Exam - General                   Respiratory           

        auscultation

                          Overall:                   breath sounds clear bilater

ally    

                          10/15/2013                   None                

 

                    Full Exam - General                   Constitutional        

            general 

appearance                   Overall:                   well developed          

                          10/15/2013                   None                

 

                    Full Exam - General                   Constitutional        

            general 

appearance                   Overall:                   in no acute distress    

                          10/15/2013                   None                

 

                    Full Exam - General                   Constitutional        

            general 

appearance                   Nourishment:                   obese               

                          10/15/2013                   None                

 

                    Full Exam - General                   Ears/Nose/Throat      

             

otoscopic exam                   Left tympanic membrane:                   not 

visualized                   10/15/2013                   due to cerumen        

       

 

                    Full Exam - General                   Ears/Nose/Throat      

             

otoscopic exam                   Right tympanic membrane:                   

abnormal light reflex                   10/15/2013                   None       

        

 

                    Full Exam - General                   Cardiovascular        

           

auscultation of heart                   Overall:                   regular rate 

                          10/15/2013                   None                

 

                    Full Exam - General                   Cardiovascular        

           

auscultation of heart                   Overall:                   normal heart 

sounds                    10/15/2013                   None                

 

                    Full Exam - General                   Psychiatric           

        

orientation/consciousness                   Overall:                   oriented 

to person, place and time                   10/15/2013                   None   

            

 

                    Full Exam - General                   Constitutional        

            general 

appearance                   Overall:                   well nourished          

                          2013                   None                

 

                    Full Exam - General                   Constitutional        

            general 

appearance                   Overall:                   well developed          

                          2013                   None                

 

                    Full Exam - General                   Constitutional        

            general 

appearance                   Overall:                   in no acute distress    

                          2013                   None                

 

                    Full Exam - General                   Neurologic            

       mental status

                    Overall:                   alert                   

3 

                                        None                

 

                    Full Exam - General                   Neurologic            

       mental status

                    Overall:                   oriented                   

2013                              None                

 

                    Full Exam - General                   Psychiatric           

        mood and 

affect                    Overall:                   normal mood and affect     

 

                          2013                   None                

 

                    Full Exam - General                   Respiratory           

        auscultation

                          Overall:                   breath sounds clear bilater

ally    

                          2013                   None                

 

                    Full Exam - General                   Cardiovascular        

           

auscultation of heart                   Overall:                   regular rate 

                          2013                   None                

 

                    Full Exam - General                   Cardiovascular        

           

auscultation of heart                   Overall:                   normal heart 

sounds                    2013                   None                

 

                    Full Exam - General                   Cardiovascular        

           

auscultation of heart                   S3 (ventricular gallop):                

                    present                   2013                   None 

               

 

                    Full Exam - General                   Cardiovascular        

           

extremities                   Overall:                   no clubbing            

                          2013                   None                

 

                    Full Exam - General                   Cardiovascular        

           

extremities                   Overall:                   No cyanosis            

                          2013                   None                

 

                    Full Exam - General                   Cardiovascular        

           

auscultation of heart                   Murmur:                   previously 

known murmur unchanged                   2013                   None      

         

 

                    Full Exam - General                   Constitutional        

            general 

appearance                   Overall:                   well nourished          

                          2013                   None                

 

                    Full Exam - General                   Constitutional        

            general 

appearance                   Overall:                   well developed          

                          2013                   None                

 

                    Full Exam - General                   Constitutional        

            general 

appearance                   Overall:                   in no acute distress    

                          2013                   None                

 

                    Full Exam - General                   Neurologic            

       mental status

                    Overall:                   alert                   

3 

                                        None                

 

                    Full Exam - General                   Neurologic            

       mental status

                    Overall:                   oriented                   

2013                              None                

 

                    Full Exam - General                   Psychiatric           

        mood and 

affect                    Overall:                   normal mood and affect     

 

                          2013                   None                

 

                    Full Exam - General                   Ears/Nose/Throat      

             

otoscopic exam                   Overall:                   external auditory 

canals clear                   2013                   None                

 

                    Full Exam - General                   Ears/Nose/Throat      

             

otoscopic exam                   Left tympanic membrane:                   air-

fluid level                   2013                   None                

 

                    Full Exam - General                   Ears/Nose/Throat      

             

otoscopic exam                   Right tympanic membrane:                   air-

fluid level                   2013                   None                

 

                    Full Exam - General                   Ears/Nose/Throat      

             

internal nose                   Turbinates:                   hypertrophy       

                          2013                   None                

 

                    Full Exam - General                   Ears/Nose/Throat      

             

internal nose                   Turbinates:                   erythema          

                          2013                   None                

 

                    Full Exam - General                   Ears/Nose/Throat      

             oral 

cavity/pharynx/larynx                    Oropharynx:                   postnasal

drainage                   2013                   None                

 

                    Full Exam - General                   Respiratory           

        auscultation

                          Overall:                   breath sounds clear bilater

ally    

                          2013                   None                

 

                    Full Exam - General                   Cardiovascular        

           

auscultation of heart                   Overall:                   regular rate 

                          2013                   None                

 

                    Full Exam - General                   Cardiovascular        

           

auscultation of heart                   Overall:                   normal heart 

sounds                    2013                   None                

 

                    Full Exam - General                   Cardiovascular        

           

auscultation of heart                   S3 (ventricular gallop):                

                    present                   2013                   None 

               

 

                    Full Exam - General                   Cardiovascular        

           

auscultation of heart                   Murmur:                   previously 

known murmur unchanged                   2013                   None      

         

 

                    Full Exam - General                   Constitutional        

            general 

appearance                   Overall:                   well nourished          

                          2013                   None                

 

                    Full Exam - General                   Constitutional        

            general 

appearance                   Overall:                   well developed          

                          2013                   None                

 

                    Full Exam - General                   Constitutional        

            general 

appearance                   Overall:                   in no acute distress    

                          2013                   None                

 

                    Full Exam - General                   Neurologic            

       mental status

                    Overall:                   alert                   

3 

                                        None                

 

                    Full Exam - General                   Neurologic            

       mental status

                    Overall:                   oriented                   

2013                              None                

 

                    Full Exam - General                   Psychiatric           

        mood and 

affect                    Overall:                   normal mood and affect     

 

                          2013                   None                

 

                    Full Exam - General                   Ears/Nose/Throat      

             

otoscopic exam                   Overall:                   external auditory 

canals clear                   2013                   None                

 

                    Full Exam - General                   Ears/Nose/Throat      

             

otoscopic exam                   Overall:                   tympanic membranes 

clear                     2013                   None                

 

                    Full Exam - General                   Ears/Nose/Throat      

             

internal nose                   Overall:                   bilateral nasal 

cavities clear                   2013                   None              

 

 

                    Full Exam - General                   Ears/Nose/Throat      

             oral 

cavity/pharynx/larynx                    Overall:                   oral mucosa 

clear                     2013                   None                

 

                    Full Exam - General                   Constitutional        

            general 

appearance                   Overall:                   well nourished          

                          05/10/2013                   None                

 

                    Full Exam - General                   Constitutional        

            general 

appearance                   Overall:                   well developed          

                          05/10/2013                   None                

 

                    Full Exam - General                   Constitutional        

            general 

appearance                   Overall:                   in no acute distress    

                          05/10/2013                   None                

 

                    Full Exam - General                   Ears/Nose/Throat      

             

otoscopic exam                   Overall:                   external auditory 

canals clear                   05/10/2013                   no tragal or pinna 

pain with movement                

 

                    Full Exam - General                   Ears/Nose/Throat      

             

otoscopic exam                   Overall:                   tympanic membranes 

clear                     05/10/2013                   right TM has perforation 

(appears old?) at 6:00 position                

 

                    Full Exam - General                   Ears/Nose/Throat      

             

otoscopic exam                   Perforation:                   right           

                          05/10/2013                   None                

 

                    Full Exam - General                   Ears/Nose/Throat      

             

internal nose                   Overall:                   bilateral nasal 

cavities clear                   05/10/2013                   None              

 

 

                    Full Exam - General                   Ears/Nose/Throat      

             

internal nose                   Overall:                   turbinates benign    

                          05/10/2013                   None                

 

                    Full Exam - General                   Ears/Nose/Throat      

             

internal nose                   Overall:                   no drainage          

                          05/10/2013                   None                

 

                    Full Exam - General                   Ears/Nose/Throat      

             

internal nose                   Sinus tenderness:                   left 

maxillary                   05/10/2013                   marked                

 

                    Full Exam - General                   Ears/Nose/Throat      

             

internal nose                   Sinus tenderness:                   right 

maxillary                   05/10/2013                   marked                

 

                    Full Exam - General                   Ears/Nose/Throat      

             

internal nose                   Sinus tenderness:                   right 

frontal                   05/10/2013                   None                

 

                    Full Exam - General                   Ears/Nose/Throat      

             

internal nose                   Sinus tenderness:                   left frontal

                          05/10/2013                   None                

 

                    Full Exam - General                   Ears/Nose/Throat      

             oral 

cavity/pharynx/larynx                    Overall:                   

oropharyngeal mucosa clear                   05/10/2013                   None  

             

 

                    Full Exam - General                   Respiratory           

        auscultation

                          Overall:                   breath sounds clear bilater

ally    

                          05/10/2013                   None                

 

                    Full Exam - General                   Cardiovascular        

           

auscultation of heart                   Overall:                   regular rate 

                          05/10/2013                   None                

 

                    Full Exam - General                   Cardiovascular        

           

auscultation of heart                   Overall:                   normal heart 

sounds                    05/10/2013                   None                

 

                    Full Exam - General                   Cardiovascular        

           

auscultation of heart                   Overall:                   no murmurs   

                          05/10/2013                   None                

 

                    Full Exam - General                   Constitutional        

            general 

appearance                   Overall:                   well nourished          

                          2013                   None                

 

                    Full Exam - General                   Constitutional        

            general 

appearance                   Overall:                   well developed          

                          2013                   None                

 

                    Full Exam - General                   Constitutional        

            general 

appearance                   Overall:                   in no acute distress    

                          2013                   None                

 

                    Full Exam - General                   Neurologic            

       mental status

                    Overall:                   alert                   

3 

                                        None                

 

                    Full Exam - General                   Neurologic            

       mental status

                    Overall:                   oriented                   

2013                              None                

 

                    Full Exam - General                   Psychiatric           

        mood and 

affect                    Overall:                   normal mood and affect     

 

                          2013                   None                

 

                    Full Exam - General                   Chest/Breast          

         breast and 

axillae palpation                       Left lower inner quadrant:              

    

                    tender                   2013                   None  

              

 

                    Full Exam - General                   Chest/Breast          

         breast and 

axillae palpation                       Left lower inner quadrant:              

    

                    no mass                   2013                   None 

               

 

                    Full Exam - General                   Chest/Breast          

         breast and 

axillae palpation                       Left upper outer quadrant:              

    

                    no mass                   2013                   None 

               

 

                    Full Exam - General                   Chest/Breast          

         breast and 

axillae palpation                       Left upper inner quadrant:              

    

                    no mass                   2013                   None 

               

 

                    Full Exam - General                   Chest/Breast          

         breast and 

axillae palpation                       Left lower outer quadrant:              

    

                    no mass                   2013                   None 

               

 

                    Full Exam - General                   Chest/Breast          

         breast and 

axillae palpation                       Right lower inner quadrant:             

    

                    tender                   2013                   None  

              

 

                    Full Exam - General                   Chest/Breast          

         breast and 

axillae palpation                       Right upper inner quadrant:             

    

                    no mass                   2013                   None 

               

 

                    Full Exam - General                   Chest/Breast          

         breast and 

axillae palpation                       Right upper outer quadrant:             

    

                    no mass                   2013                   None 

               

 

                    Full Exam - General                   Chest/Breast          

         breast and 

axillae palpation                       Right lower outer quadrant:             

    

                    no mass                   2013                   None 

               

 

                    Full Exam - General                   Constitutional        

            general 

appearance                   Overall:                   well nourished          

                          2012                   None                

 

                    Full Exam - General                   Constitutional        

            general 

appearance                   Overall:                   well developed          

                          2012                   None                

 

                    Full Exam - General                   Constitutional        

            general 

appearance                   Overall:                   in no acute distress    

                          2012                   None                

 

                    Full Exam - General                   Neurologic            

       mental status

                    Overall:                   alert                   

2 

                                        None                

 

                    Full Exam - General                   Neurologic            

       mental status

                    Overall:                   oriented                   

2012                              None                

 

                    Full Exam - General                   Psychiatric           

        mood and 

affect                    Overall:                   normal mood and affect     

 

                          2012                   None                

 

                    Full Exam - General                   Respiratory           

        auscultation

                          Overall:                   breath sounds clear bilater

ally    

                          2012                   None                

 

                    Full Exam - General                   Cardiovascular        

           

auscultation of heart                   Overall:                   regular rate 

                          2012                   None                

 

                    Full Exam - General                   Cardiovascular        

           

auscultation of heart                   Overall:                   normal heart 

sounds                    2012                   None                

 

                    Full Exam - General                   Cardiovascular        

           

auscultation of heart                   S3 (ventricular gallop):                

                    present                   2012                   None 

               

 

                    Full Exam - General                   Cardiovascular        

           

auscultation of heart                   Murmur:                   previously 

known murmur unchanged                   2012                   None      

         

 

                    Full Exam - General                   Ears/Nose/Throat      

             

otoscopic exam                          Left external auditory canal:           

       

                    complete cerumen impaction                   2012     

              None  

             

 

                    Full Exam - General                   Ears/Nose/Throat      

             

otoscopic exam                          Right external auditory canal:          

       

                    partial cerumen occlusion                   2012      

             None  

             

 

                    Full Exam - General                   Ears/Nose/Throat      

             

otoscopic exam                   Left tympanic membrane:                   

abnormal light reflex                   2012                   None       

        

 

                    Full Exam - General                   Ears/Nose/Throat      

             

otoscopic exam                   Right tympanic membrane:                   

abnormal light reflex                   2012                   None       

        

 

                    Full Exam - General                   Ears/Nose/Throat      

             

internal nose                   Turbinates:                   hypertrophy       

                          2012                   None                

 

                    Full Exam - General                   Ears/Nose/Throat      

             oral 

cavity/pharynx/larynx                    Overall:                   oral mucosa 

clear                     2012                   None                

 

                    Full Exam - General                   Abdomen               

    abdominal exam  

                    Overall:                   no masses                   

2012                              None                

 

                    Full Exam - General                   Abdomen               

    abdominal exam  

                          Overall:                   normal bowel sounds        

          

                          2012                   None                

 

                    Full Exam - General                   Abdomen               

    abdominal exam  

                    Overall:                   soft                   2012

    

                                        None                

 

                    Full Exam - General                   Abdomen               

    abdominal exam  

                          Right upper quadrant:                   tender to palp

ation     

                          2012                   None                

 

                    Full Exam - General                   Abdomen               

    abdominal exam  

                          Epigastric:                   tender to palpation     

          

                          2012                   None                

 

                    Full Exam - General                   Constitutional        

            general 

appearance                   Overall:                   well nourished          

                          10/23/2012                   None                

 

                    Full Exam - General                   Constitutional        

            general 

appearance                   Overall:                   well developed          

                          10/23/2012                   None                

 

                    Full Exam - General                   Constitutional        

            general 

appearance                   Overall:                   in no acute distress    

                          10/23/2012                   None                

 

                    Full Exam - General                   Neurologic            

       mental status

                    Overall:                   alert                   10/23/201

2 

                                        None                

 

                    Full Exam - General                   Neurologic            

       mental status

                    Overall:                   oriented                   

10/23/2012                              None                

 

                    Full Exam - General                   Psychiatric           

        mood and 

affect                    Overall:                   normal mood and affect     

 

                          10/23/2012                   None                

 

                    Full Exam - General                   Abdomen               

    abdominal exam  

                    Overall:                   no masses                   

10/23/2012                              None                

 

                    Full Exam - General                   Abdomen               

    abdominal exam  

                          Overall:                   normal bowel sounds        

          

                          10/23/2012                   None                

 

                    Full Exam - General                   Abdomen               

    abdominal exam  

                    Overall:                   soft                   10/23/2012

    

                                        None                

 

                    Full Exam - General                   Abdomen               

    abdominal exam  

                          Epigastric:                   tender to palpation     

          

                          10/23/2012                   None                

 

                    Full Exam - General                   Abdomen               

    abdominal exam  

                          Left upper quadrant:                   tender to palpa

tion      

                          10/23/2012                   None                

 

                    Full Exam - General                   Abdomen               

    abdominal exam  

                          Left lower quadrant:                   tender to palpa

tion      

                          10/23/2012                   None                

 

                    Full Exam - General                   Abdomen               

    abdominal exam  

                          Right upper quadrant:                   non-tender to 

palpation 

                          10/23/2012                   None                

 

                    Full Exam - General                   Abdomen               

    abdominal exam  

                          Right lower quadrant:                   tender to palp

ation     

                          10/23/2012                   None                

 

                    Full Exam - General                   Constitutional        

            general 

appearance                   Overall:                   well nourished          

                          2012                   None                

 

                    Full Exam - General                   Constitutional        

            general 

appearance                   Overall:                   well developed          

                          2012                   None                

 

                    Full Exam - General                   Constitutional        

            general 

appearance                   Evidence of Distress:                   anxious    

                          2012                   None                

 

                    Full Exam - General                   Respiratory           

        auscultation

                          Overall:                   breath sounds clear bilater

ally    

                          2012                   None                

 

                    Full Exam - General                   Cardiovascular        

           

auscultation of heart                   Overall:                   regular rate 

                          2012                   None                

 

                    Full Exam - General                   Cardiovascular        

           

auscultation of heart                   Overall:                   normal heart 

sounds                    2012                   None                

 

                    Full Exam - General                   Cardiovascular        

           

auscultation of heart                   Murmur:                   previously 

known murmur unchanged                   2012                   None      

         

 

                    Full Exam - General                   Cardiovascular        

           

auscultation of heart                   S3 (ventricular gallop):                

                    present                   2012                   None 

               

 

                    Full Exam - General                   Neurologic            

       mental status

                    Overall:                   alert                   

2 

                                        None                

 

                    Full Exam - General                   Neurologic            

       mental status

                    Overall:                   oriented                   

2012                              None                

 

                    Full Exam - General                   Psychiatric           

        mood and 

affect                    Overall:                   normal mood and affect     

 

                          2012                   None                

 

                    Full Exam - General                   Constitutional        

            general 

appearance                   Overall:                   well nourished          

                          2012                   None                

 

                    Full Exam - General                   Constitutional        

            general 

appearance                   Overall:                   well developed          

                          2012                   None                

 

                    Full Exam - General                   Constitutional        

            general 

appearance                   Overall:                   in no acute distress    

                          2012                   None                

 

                    Full Exam - General                   Neurologic            

       mental status

                    Overall:                   alert                   

2 

                                        None                

 

                    Full Exam - General                   Neurologic            

       mental status

                    Overall:                   oriented                   

2012                              None                

 

                    Full Exam - General                   Psychiatric           

        mood and 

affect                    Overall:                   normal mood and affect     

 

                          2012                   None                

 

                    Full Exam - General                   Respiratory           

        auscultation

                          Overall:                   breath sounds clear bilater

ally    

                          2012                   None                

 

                    Full Exam - General                   Cardiovascular        

           

auscultation of heart                   Overall:                   regular rate 

                          2012                   None                

 

                    Full Exam - General                   Cardiovascular        

           

auscultation of heart                   Overall:                   normal heart 

sounds                    2012                   None                

 

                    Full Exam - General                   Cardiovascular        

           

auscultation of heart                   S3 (ventricular gallop):                

                    present                   2012                   None 

               

 

                    Full Exam - General                   Cardiovascular        

           

auscultation of heart                   Murmur:                   previously 

known murmur unchanged                   2012                   None      

         

 

                    Full Exam - General                   Cardiovascular        

           

extremities                   Overall:                   no clubbing            

                          2012                   None                

 

                    Full Exam - General                   Cardiovascular        

           

extremities                   Overall:                   No edema               

                          2012                   None                

 

                    Full Exam - General                   Cardiovascular        

           

extremities                   Overall:                   No cyanosis            

                          2012                   None                

 

                    Full Exam - General                   Constitutional        

            general 

appearance                   Overall:                   well nourished          

                          2012                   None                

 

                    Full Exam - General                   Constitutional        

            general 

appearance                   Overall:                   well developed          

                          2012                   None                

 

                    Full Exam - General                   Constitutional        

            general 

appearance                   Overall:                   in no acute distress    

                          2012                   None                

 

                    Full Exam - General                   Neurologic            

       mental status

                    Overall:                   alert                   

2 

                                        None                

 

                    Full Exam - General                   Neurologic            

       mental status

                    Overall:                   oriented                   

2012                              None                

 

                    Full Exam - General                   Psychiatric           

        mood and 

affect                    Overall:                   normal mood and affect     

 

                          2012                   None                

 

                    Full Exam - General                   Musculoskeletal       

            spine, 

ribs and pelvis                   Spine:                    tender @ lumbar spin

e

                          2012                   None                

 

                    Full Exam - General                   Constitutional        

            general 

appearance                   Overall:                   well nourished          

                          2012                   None                

 

                    Full Exam - General                   Constitutional        

            general 

appearance                   Overall:                   well developed          

                          2012                   None                

 

                    Full Exam - General                   Constitutional        

            general 

appearance                   Overall:                   in no acute distress    

                          2012                   None                

 

                    Full Exam - General                   Neurologic            

       mental status

                    Overall:                   alert                   

2 

                                        None                

 

                    Full Exam - General                   Psychiatric           

        mood and 

affect                    Overall:                   normal mood and affect     

 

                          2012                   None                

 

                    Full Exam - General                   Musculoskeletal       

            spine, 

ribs and pelvis                   Sacroiliac joints:                   tender 

right sacroiliac joint                   2012                   None      

         

 

                    Full Exam - General                   Musculoskeletal       

            spine, 

ribs and pelvis                   Spine:                    tender @ thoracic 

spine                     2012                   None                

 

                    Full Exam - General                   Musculoskeletal       

            spine, 

ribs and pelvis                   Spine:                    tender @ lumbar spin

e

                          2012                   None                

 

                    Full Exam - General                   Constitutional        

            general 

appearance                   Overall:                   well nourished          

                          2012                   None                

 

                    Full Exam - General                   Constitutional        

            general 

appearance                   Overall:                   well developed          

                          2012                   None                

 

                    Full Exam - General                   Constitutional        

            general 

appearance                   Overall:                   in no acute distress    

                          2012                   None                

 

                    Full Exam - General                   Musculoskeletal       

            spine, 

ribs and pelvis                   Spine:                    tender @ thoracic 

spine                     2012                   None                

 

                    Full Exam - General                   Neurologic            

       mental status

                    Overall:                   alert                   

2 

                                        None                

 

                    Full Exam - General                   Neurologic            

       mental status

                    Overall:                   oriented                   

2012                              None                

 

                    Full Exam - General                   Psychiatric           

        mood and 

affect                    Overall:                   normal mood and affect     

 

                          2012                   None                

 

                    Full Exam - General                   Constitutional        

            general 

appearance                   Overall:                   well nourished          

                          2012                   None                

 

                    Full Exam - General                   Constitutional        

            general 

appearance                   Overall:                   well developed          

                          2012                   None                

 

                    Full Exam - General                   Constitutional        

            general 

appearance                   Overall:                   in no acute distress    

                          2012                   None                

 

                    Full Exam - General                   Neurologic            

       mental status

                    Overall:                   alert                   

2 

                                        None                

 

                    Full Exam - General                   Neurologic            

       mental status

                    Overall:                   oriented                   

2012                              None                

 

                    Full Exam - General                   Psychiatric           

        mood and 

affect                    Overall:                   normal mood and affect     

 

                          2012                   None                

 

                    Full Exam - General                   Musculoskeletal       

            spine, 

ribs and pelvis                   Spine:                    tender @ thoracic 

spine                     2012                   None                

 

                    Full Exam - General                   Musculoskeletal       

            spine, 

ribs and pelvis                   Spine:                    tender @ lumbar spin

e

                          2012                   None                

 

                    Full Exam - General                   Musculoskeletal       

            gait and

station                   Overall:                   normal station             

                          2012                   None                

 

                    Full Exam - General                   Musculoskeletal       

            gait and

station                   Gait:                   antalgic                   

2012                              None                

 

                    Full Exam - General                   Musculoskeletal       

            spine, 

ribs and pelvis                   Sacroiliac joints:                   tender 

right sacroiliac joint                   2012                   None      

         

 

                    Full Exam - General                   Musculoskeletal       

            spine, 

ribs and pelvis                   Sacroiliac joints:                   tender 

left sacroiliac joint                   2012                   None       

        

 

                    Full Exam - General                   Constitutional        

            general 

appearance                   Overall:                   in no acute distress    

                          2012                   None                

 

                    Full Exam - General                   Constitutional        

            general 

appearance                   Overall:                   well developed          

                          2012                   None                

 

                    Full Exam - General                   Constitutional        

            general 

appearance                   Overall:                   well nourished          

                          2012                   None                

 

                    Full Exam - General                   Neurologic            

       mental status

                    Overall:                   alert                   

2 

                                        None                

 

                    Full Exam - General                   Neurologic            

       mental status

                    Overall:                   oriented                   

2012                              None                

 

                    Full Exam - General                   Psychiatric           

        mood and 

affect                    Overall:                   normal mood and affect     

 

                          2012                   None                

 

                    Full Exam - General                   Respiratory           

        auscultation

                          Right upper lung field:                   a normal exa

m       

                          2012                   None                

 

                    Full Exam - General                   Respiratory           

        auscultation

                          Right middle lung field:                   a normal ex

am      

                          2012                   None                

 

                    Full Exam - General                   Respiratory           

        auscultation

                          Right lower lung field:                   a normal exa

m       

                          2012                   None                

 

                    Full Exam - General                   Respiratory           

        auscultation

                          Left lower lung field:                   a normal exam

        

                          2012                   None                

 

                    Full Exam - General                   Respiratory           

        auscultation

                          Overall:                   breath sounds clear bilater

ally    

                          2012                   None                

 

                    Full Exam - General                   Respiratory           

        auscultation

                          Left upper lung field:                   a normal exam

        

                          2012                   None                

 

                    Full Exam - General                   Respiratory           

        auscultation

                    Diffuse:                   a normal exam                   

2012                              None                

 

                    Full Exam - General                   Cardiovascular        

           

auscultation of heart                   Overall:                   no murmurs   

                          2012                   None                

 

                    Full Exam - General                   Cardiovascular        

           

auscultation of heart                   Overall:                   regular rate 

                          2012                   None                

 

                    Full Exam - General                   Cardiovascular        

           

auscultation of heart                   Overall:                   normal heart 

sounds                    2012                   None                

 

                    Full Exam - General                   Cardiovascular        

           

auscultation of heart                   S1:                       a normal exam 

    

                          2012                   None                

 

                    Full Exam - General                   Cardiovascular        

           

auscultation of heart                   S2:                       a normal exam 

    

                          2012                   None                

 

                    Full Exam - General                   Cardiovascular        

           

auscultation of heart                   Rhythm:                   regular rhythm

                          2012                   None                

 

                    Full Exam - General                   Cardiovascular        

           

auscultation of heart                   Rate:                     regular rate  

  

                          2012                   None                

 

                    Full Exam - General                   Cardiovascular        

           

auscultation of heart                   S3 (ventricular gallop):                

                    present                   2012                   None 

               

 

                    Full Exam - General                   Cardiovascular        

           

auscultation of heart                   Murmur:                   previously 

known murmur unchanged                   2012                   None      

         

 

                    Full Exam - General                   Cardiovascular        

           

extremities                   Overall:                   no clubbing            

                          2012                   None                

 

                    Full Exam - General                   Cardiovascular        

           

extremities                   Overall:                   No edema               

                          2012                   None                

 

                    Full Exam - General                   Cardiovascular        

           

extremities                   Overall:                   No cyanosis            

                          2012                   None                

 

                    Full Exam - General                   Constitutional        

            general 

appearance                   Overall:                   in no acute distress    

                          2011                   None                

 

                    Full Exam - General                   Constitutional        

            general 

appearance                   Overall:                   well developed          

                          2011                   None                

 

                    Full Exam - General                   Constitutional        

            general 

appearance                   Overall:                   well nourished          

                          2011                   None                

 

                    Full Exam - General                   Neurologic            

       mental status

                    Overall:                   alert                   

1 

                                        None                

 

                    Full Exam - General                   Neurologic            

       mental status

                    Overall:                   oriented                   

2011                              None                

 

                    Full Exam - General                   Psychiatric           

        mood and 

affect                    Overall:                   normal mood and affect     

 

                          2011                   None                

 

                    Full Exam - General                   Cardiovascular        

           

auscultation of heart                   Overall:                   no murmurs   

                          2011                   None                

 

                    Full Exam - General                   Cardiovascular        

           

auscultation of heart                   Overall:                   regular rate 

                          2011                   None                

 

                    Full Exam - General                   Cardiovascular        

           

auscultation of heart                   Overall:                   normal heart 

sounds                    2011                   None                

 

                    Full Exam - General                   Cardiovascular        

           

auscultation of heart                   S1:                       a normal exam 

    

                          2011                   None                

 

                    Full Exam - General                   Cardiovascular        

           

auscultation of heart                   S2:                       a normal exam 

    

                          2011                   None                

 

                    Full Exam - General                   Cardiovascular        

           

auscultation of heart                   Rhythm:                   regular rhythm

                          2011                   None                

 

                    Full Exam - General                   Cardiovascular        

           

auscultation of heart                   Rate:                     regular rate  

  

                          2011                   None                

 

                    Full Exam - General                   Cardiovascular        

           

auscultation of heart                   S3 (ventricular gallop):                

                    present                   2011                   None 

               

 

                    Full Exam - General                   Respiratory           

        auscultation

                          Right upper lung field:                   a normal exa

m       

                          2011                   None                

 

                    Full Exam - General                   Respiratory           

        auscultation

                          Right middle lung field:                   a normal ex

am      

                          2011                   None                

 

                    Full Exam - General                   Respiratory           

        auscultation

                          Right lower lung field:                   a normal exa

m       

                          2011                   None                

 

                    Full Exam - General                   Respiratory           

        auscultation

                          Left lower lung field:                   a normal exam

        

                          2011                   None                

 

                    Full Exam - General                   Respiratory           

        auscultation

                          Overall:                   breath sounds clear bilater

ally    

                          2011                   None                

 

                    Full Exam - General                   Respiratory           

        auscultation

                          Left upper lung field:                   a normal exam

        

                          2011                   None                

 

                    Full Exam - General                   Respiratory           

        auscultation

                    Diffuse:                   a normal exam                   

2011                              None                

 

                    Full Exam - General                   Ears/Nose/Throat      

             

otoscopic exam                   Overall:                   tympanic membranes 

clear                     2011                   None                

 

                    Full Exam - General                   Ears/Nose/Throat      

             

internal nose                   Turbinates:                   hypertrophy       

                          2011                   None                

 

                    Full Exam - General                   Ears/Nose/Throat      

             

internal nose                   Turbinates:                   erythema          

                          2011                   None                

 

                    Full Exam - General                   Ears/Nose/Throat      

             oral 

cavity/pharynx/larynx                    Overall:                   oral mucosa 

clear                     2011                   None                

 

                    Full Exam - General                   Neck                  

 inspection of neck 

                    Overall:                   normal size                   

2011                              None                

 

                    Full Exam - General                   Neck                  

 inspection of neck 

                    Overall:                   no masses                   

2011                              None                

 

                    Full Exam - General                   Ears/Nose/Throat      

             

otoscopic exam                          Left external auditory canal:           

       

                    complete cerumen impaction                   2011     

              None  

             

 

                    Full Exam - General                   Ears/Nose/Throat      

             

otoscopic exam                          Right external auditory canal:          

       

                    complete cerumen impaction                   2011     

              None 

              

 

                    Full Exam - General                   Constitutional        

            general 

appearance                   Overall:                   in no acute distress    

                          2011                   None                

 

                    Full Exam - General                   Constitutional        

            general 

appearance                   Overall:                   well developed          

                          2011                   None                

 

                    Full Exam - General                   Constitutional        

            general 

appearance                   Overall:                   well nourished          

                          2011                   None                

 

                    Full Exam - General                   Neurologic            

       mental status

                    Overall:                   alert                   

1 

                                        None                

 

                    Full Exam - General                   Neurologic            

       mental status

                    Overall:                   oriented                   

2011                              None                

 

                    Full Exam - General                   Psychiatric           

        mood and 

affect                    Overall:                   normal mood and affect     

 

                          2011                   None                

 

                    Full Exam - General                   Respiratory           

        auscultation

                          Right upper lung field:                   a normal exa

m       

                          2011                   None                

 

                    Full Exam - General                   Respiratory           

        auscultation

                          Right middle lung field:                   a normal ex

am      

                          2011                   None                

 

                    Full Exam - General                   Respiratory           

        auscultation

                          Right lower lung field:                   a normal exa

m       

                          2011                   None                

 

                    Full Exam - General                   Respiratory           

        auscultation

                          Left lower lung field:                   a normal exam

        

                          2011                   None                

 

                    Full Exam - General                   Respiratory           

        auscultation

                          Overall:                   breath sounds clear bilater

ally    

                          2011                   None                

 

                    Full Exam - General                   Respiratory           

        auscultation

                          Left upper lung field:                   a normal exam

        

                          2011                   None                

 

                    Full Exam - General                   Respiratory           

        auscultation

                    Diffuse:                   a normal exam                   

2011                              None                

 

                    Full Exam - General                   Cardiovascular        

           

auscultation of heart                   Overall:                   no murmurs   

                          2011                   None                

 

                    Full Exam - General                   Cardiovascular        

           

auscultation of heart                   Overall:                   regular rate 

                          2011                   None                

 

                    Full Exam - General                   Cardiovascular        

           

auscultation of heart                   Overall:                   normal heart 

sounds                    2011                   None                

 

                    Full Exam - General                   Cardiovascular        

           

auscultation of heart                   S1:                       a normal exam 

    

                          2011                   None                

 

                    Full Exam - General                   Cardiovascular        

           

auscultation of heart                   S2:                       a normal exam 

    

                          2011                   None                

 

                    Full Exam - General                   Cardiovascular        

           

auscultation of heart                   Rhythm:                   regular rhythm

                          2011                   None                

 

                    Full Exam - General                   Cardiovascular        

           

auscultation of heart                   Rate:                     regular rate  

  

                          2011                   None                

 

                    Full Exam - General                   Cardiovascular        

           

auscultation of heart                   S3 (ventricular gallop):                

                    present                   2011                   None 

               

 

                    Full Exam - General                   Abdomen               

    abdominal exam  

                    Overall:                   no masses                   

2011                              None                

 

                    Full Exam - General                   Abdomen               

    abdominal exam  

                          Overall:                   normal bowel sounds        

          

                          2011                   None                

 

                    Full Exam - General                   Abdomen               

    abdominal exam  

                    Overall:                   soft                   2011

    

                                        None                

 

                    Full Exam - General                   Abdomen               

    abdominal exam  

                          Epigastric:                   tender to palpation     

          

                          2011                   None                

 

                    Full Exam - General                   Constitutional        

            general 

appearance                   Overall:                   in no acute distress    

                          2011                   None                

 

                    Full Exam - General                   Constitutional        

            general 

appearance                   Overall:                   well developed          

                          2011                   None                

 

                    Full Exam - General                   Constitutional        

            general 

appearance                   Overall:                   well nourished          

                          2011                   None                

 

                    Full Exam - General                   Neurologic            

       mental status

                    Overall:                   alert                   

1 

                                        None                

 

                    Full Exam - General                   Neurologic            

       mental status

                    Overall:                   oriented                   

2011                              None                

 

                    Full Exam - General                   Psychiatric           

        mood and 

affect                    Overall:                   normal mood and affect     

 

                          2011                   None                

 

                    Full Exam - General                   Abdomen               

    abdominal exam  

                    Overall:                   no masses                   

2011                              None                

 

                    Full Exam - General                   Abdomen               

    abdominal exam  

                    Overall:                   no tenderness                   

2011                              None                

 

                    Full Exam - General                   Abdomen               

    abdominal exam  

                          Overall:                   normal bowel sounds        

          

                          2011                   None                

 

                    Full Exam - General                   Abdomen               

    abdominal exam  

                    Overall:                   soft                   2011

    

                                        None                

 

                    Full Exam - General                   Respiratory           

        auscultation

                          Right upper lung field:                   a normal exa

m       

                          2011                   None                

 

                    Full Exam - General                   Respiratory           

        auscultation

                          Right middle lung field:                   a normal ex

am      

                          2011                   None                

 

                    Full Exam - General                   Respiratory           

        auscultation

                          Right lower lung field:                   a normal exa

m       

                          2011                   None                

 

                    Full Exam - General                   Respiratory           

        auscultation

                          Left lower lung field:                   a normal exam

        

                          2011                   None                

 

                    Full Exam - General                   Respiratory           

        auscultation

                          Overall:                   breath sounds clear bilater

ally    

                          2011                   None                

 

                    Full Exam - General                   Respiratory           

        auscultation

                          Left upper lung field:                   a normal exam

        

                          2011                   None                

 

                    Full Exam - General                   Respiratory           

        auscultation

                    Diffuse:                   a normal exam                   

2011                              None                

 

                    Full Exam - General                   Cardiovascular        

           

auscultation of heart                   Overall:                   no murmurs   

                          2011                   None                

 

                    Full Exam - General                   Cardiovascular        

           

auscultation of heart                   Overall:                   regular rate 

                          2011                   None                

 

                    Full Exam - General                   Cardiovascular        

           

auscultation of heart                   Overall:                   normal heart 

sounds                    2011                   None                

 

                    Full Exam - General                   Cardiovascular        

           

auscultation of heart                   S1:                       a normal exam 

    

                          2011                   None                

 

                    Full Exam - General                   Cardiovascular        

           

auscultation of heart                   S2:                       a normal exam 

    

                          2011                   None                

 

                    Full Exam - General                   Cardiovascular        

           

auscultation of heart                   Rhythm:                   regular rhythm

                          2011                   None                

 

                    Full Exam - General                   Cardiovascular        

           

auscultation of heart                   Rate:                     regular rate  

  

                          2011                   None                

 

                    Full Exam - General                   Cardiovascular        

           

auscultation of heart                   S3 (ventricular gallop):                

                    present                   2011                   None 

               

 

                    Full Exam - General                   Cardiovascular        

           

auscultation of heart                   Murmur:                   previously 

known murmur unchanged                   2011                   None      

         

 

                    Full Exam - General                   Cardiovascular        

           

extremities                   Overall:                   no clubbing            

                          2011                   None                

 

                    Full Exam - General                   Cardiovascular        

           

extremities                   Overall:                   No edema               

                          2011                   None                

 

                    Full Exam - General                   Cardiovascular        

           

extremities                   Overall:                   No cyanosis            

                          2011                   None                

 

                    Full Exam - General                   Cardiovascular        

           

auscultation of heart                   Overall:                   no murmurs   

                          2011                   None                

 

                    Full Exam - General                   Cardiovascular        

           

auscultation of heart                   Rate:                     regular rate  

  

                          2011                   None                

 

                    Full Exam - General                   Cardiovascular        

           

auscultation of heart                   Rhythm:                   regular rhythm

                          2011                   None                

 

                    Full Exam - General                   Cardiovascular        

           

auscultation of heart                   S1:                       a normal exam 

    

                          2011                   None                

 

                    Full Exam - General                   Cardiovascular        

           

auscultation of heart                   S2:                       a normal exam 

    

                          2011                   None                

 

                    Full Exam - General                   Cardiovascular        

           

auscultation of heart                   S3 (ventricular gallop):                

                    present                   2011                   None 

               

 

                    Full Exam - General                   Ears/Nose/Throat      

             

otoscopic exam                   Overall:                   external auditory 

canals clear                   2011                   None                

 

                    Full Exam - General                   Ears/Nose/Throat      

             

internal nose                   Turbinates:                   hypertrophy       

                          2011                   None                

 

                    Full Exam - General                   Ears/Nose/Throat      

             

internal nose                   Turbinates:                   erythema          

                          2011                   None                

 

                    Full Exam - General                   Ears/Nose/Throat      

             oral 

cavity/pharynx/larynx                    Overall:                   oral mucosa 

clear                     2011                   None                

 

                    Full Exam - General                   Constitutional        

            general 

appearance                   Overall:                   well nourished          

                          2011                   None                

 

                    Full Exam - General                   Constitutional        

            general 

appearance                   Overall:                   well developed          

                          2011                   None                

 

                    Full Exam - General                   Constitutional        

            general 

appearance                   Overall:                   in no acute distress    

                          2011                   None                

 

                    Full Exam - General                   Neurologic            

       mental status

                    Overall:                   alert                   

1 

                                        None                

 

                    Full Exam - General                   Neurologic            

       mental status

                    Overall:                   oriented                   

2011                              None                

 

                    Full Exam - General                   Psychiatric           

        mood and 

affect                    Overall:                   normal mood and affect     

 

                          2011                   None                

 

                    Full Exam - General                   Respiratory           

        auscultation

                          Overall:                   breath sounds clear bilater

ally    

                          2011                   None                

 

                    Full Exam - General                   Respiratory           

        auscultation

                    Diffuse:                   a normal exam                   

2011                              None                

 

                    Full Exam - General                   Respiratory           

        auscultation

                          Left upper lung field:                   a normal exam

        

                          2011                   None                

 

                    Full Exam - General                   Respiratory           

        auscultation

                          Left lower lung field:                   a normal exam

        

                          2011                   None                

 

                    Full Exam - General                   Respiratory           

        auscultation

                          Right upper lung field:                   a normal exa

m       

                          2011                   None                

 

                    Full Exam - General                   Respiratory           

        auscultation

                          Right middle lung field:                   a normal ex

am      

                          2011                   None                

 

                    Full Exam - General                   Respiratory           

        auscultation

                          Right lower lung field:                   a normal exa

m       

                          2011                   None                

 

                    Full Exam - General                   Cardiovascular        

           

auscultation of heart                   Overall:                   regular rate 

                          2011                   None                

 

                    Full Exam - General                   Cardiovascular        

           

auscultation of heart                   Overall:                   normal heart 

sounds                    2011                   None                

 

                    Full Exam - General                   Constitutional        

            general 

appearance                   Overall:                   well nourished          

                          2011                   None                

 

                    Full Exam - General                   Constitutional        

            general 

appearance                   Overall:                   well developed          

                          2011                   None                

 

                    Full Exam - General                   Constitutional        

            general 

appearance                   Overall:                   in no acute distress    

                          2011                   None                

 

                    Full Exam - General                   Neurologic            

       mental status

                    Overall:                   alert                   

1 

                                        None                

 

                    Full Exam - General                   Neurologic            

       mental status

                    Overall:                   oriented                   

2011                              None                

 

                    Full Exam - General                   Psychiatric           

        mood and 

affect                    Overall:                   normal mood and affect     

 

                          2011                   None                

 

                    Full Exam - General                   Musculoskeletal       

            spine, 

ribs and pelvis                   Spine:                    tender @ lumbar spin

e

                          2011                   None                

 

                    Full Exam - General                   Musculoskeletal       

            spine, 

ribs and pelvis                   Sacroiliac joints:                   tender 

right sacroiliac joint                   2011                   None      

         

 

                    Full Exam - General                   Musculoskeletal       

            spine, 

ribs and pelvis                   Sacroiliac joints:                   tender 

right sacroiliac joint                   2011                   None      

         

 

                    Full Exam - General                   Musculoskeletal       

            spine, 

ribs and pelvis                   Sacroiliac joints:                   tender 

left sacroiliac joint                   2011                   None       

        

 

                    Full Exam - General                   Respiratory           

        auscultation

                          Overall:                   breath sounds clear bilater

ally    

                          2011                   None                

 

                    Full Exam - General                   Respiratory           

        auscultation

                    Diffuse:                   a normal exam                   

2011                              None                

 

                    Full Exam - General                   Respiratory           

        auscultation

                          Left upper lung field:                   a normal exam

        

                          2011                   None                

 

                    Full Exam - General                   Respiratory           

        auscultation

                          Left lower lung field:                   a normal exam

        

                          2011                   None                

 

                    Full Exam - General                   Respiratory           

        auscultation

                          Right upper lung field:                   a normal exa

m       

                          2011                   None                

 

                    Full Exam - General                   Respiratory           

        auscultation

                          Right middle lung field:                   a normal ex

am      

                          2011                   None                

 

                    Full Exam - General                   Respiratory           

        auscultation

                          Right lower lung field:                   a normal exa

m       

                          2011                   None                

 

                    Full Exam - General                   Cardiovascular        

           

auscultation of heart                   Overall:                   regular rate 

                          2011                   None                

 

                    Full Exam - General                   Cardiovascular        

           

auscultation of heart                   Overall:                   normal heart 

sounds                    2011                   None                

 

                    Full Exam - General                   Cardiovascular        

           

auscultation of heart                   Overall:                   no murmurs   

                          2011                   None                

 

                    Full Exam - General                   Cardiovascular        

           

auscultation of heart                   Rate:                     regular rate  

  

                          2011                   None                

 

                    Full Exam - General                   Cardiovascular        

           

auscultation of heart                   Rhythm:                   regular rhythm

                          2011                   None                

 

                    Full Exam - General                   Constitutional        

            general 

appearance                   Overall:                   well nourished          

                          2011                   None                

 

                    Full Exam - General                   Constitutional        

            general 

appearance                   Overall:                   well developed          

                          2011                   None                

 

                    Full Exam - General                   Constitutional        

            general 

appearance                   Overall:                   in no acute distress    

                          2011                   None                

 

                    Full Exam - General                   Neurologic            

       mental status

                    Overall:                   alert                   

1 

                                        None                

 

                    Full Exam - General                   Neurologic            

       mental status

                    Overall:                   oriented                   

2011                              None                

 

                    Full Exam - General                   Psychiatric           

        mood and 

affect                    Overall:                   normal mood and affect     

 

                          2011                   None                

 

                    Full Exam - General                   Musculoskeletal       

            spine, 

ribs and pelvis                   Spine:                    tender @ lumbar spin

e

                          2011                   None                

 

                    Full Exam - General                   Cardiovascular        

           

auscultation of heart                   S1:                       a normal exam 

    

                          2011                   None                

 

                    Full Exam - General                   Cardiovascular        

           

auscultation of heart                   S2:                       a normal exam 

    

                          2011                   None                

 

                    Full Exam - General                   Cardiovascular        

           

auscultation of heart                   S3 (ventricular gallop):                

                    present                   2011                   None 

               

 

                    Full Exam - General                   Eyes                  

 conjunctiva/eyelids

                    Overall:                   cornea clear                   

2010                              None                

 

                    Full Exam - General                   Eyes                  

 conjunctiva/eyelids

                    Overall:                   eyelids normal                   

2010                              None                

 

                    Full Exam - General                   Eyes                  

 pupils and irises  

                          Overall:                   pupils equal, round, reacti

ve to 

light and accomodation                   2010                   None      

         

 

                    Full Exam - General                   Ears/Nose/Throat      

             

external ear                   Overall:                   normal appearance     

                          2010                   None                

 

                    Full Exam - General                   Ears/Nose/Throat      

             

otoscopic exam                          Left external auditory canal:           

       

                    complete cerumen impaction                   2010     

              None  

             

 

                    Full Exam - General                   Ears/Nose/Throat      

             

otoscopic exam                          Right external auditory canal:          

       

                    complete cerumen impaction                   2010     

              None 

              

 

                    Full Exam - General                   Ears/Nose/Throat      

             

otoscopic exam                   Left tympanic membrane:                   not 

visualized                   2010                   None                

 

                    Full Exam - General                   Ears/Nose/Throat      

             

otoscopic exam                   Right tympanic membrane:                   not 

visualized                   2010                   None                

 

                    Full Exam - General                   Ears/Nose/Throat      

             

lips/teeth/gingiva                   Overall:                   benign lips     

                          2010                   None                

 

                    Full Exam - General                   Ears/Nose/Throat      

             

lips/teeth/gingiva                   Overall:                   benign gingiva  

                          2010                   None                

 

                    Full Exam - General                   Ears/Nose/Throat      

             

lips/teeth/gingiva                   Overall:                   normal dentition

                          2010                   None                

 

                    Full Exam - General                   Ears/Nose/Throat      

             oral 

cavity/pharynx/larynx                    Overall:                   tonsils 

benign                    2010                   None                

 

                    Full Exam - General                   Ears/Nose/Throat      

             oral 

cavity/pharynx/larynx                    Oropharynx:                   erythema 

                          2010                   None                

 

                    Full Exam - General                   Respiratory           

        auscultation

                          Overall:                   breath sounds clear bilater

ally    

                          2010                   None                

 

                    Full Exam - General                   Respiratory           

        respiratory 

effort/rhythm                   Overall:                   no retractions       

                          2010                   None                

 

                    Full Exam - General                   Respiratory           

        respiratory 

effort/rhythm                   Overall:                   normal rate          

                          2010                   occasionally coughs durin

g the exam        

       

 

                    Full Exam - General                   Cardiovascular        

           

auscultation of heart                   Overall:                   regular rate 

                          2010                   None                

 

                    Full Exam - General                   Cardiovascular        

           

auscultation of heart                   Overall:                   normal heart 

sounds                    2010                   None                

 

                    Full Exam - General                   Psychiatric           

        

orientation/consciousness                   Overall:                   oriented 

to person, place and time                   2010                   None   

            

 

                    Full Exam - General                   Constitutional        

            general 

appearance                   Overall:                   well nourished          

                          2010                   None                

 

                    Full Exam - General                   Constitutional        

            general 

appearance                   Overall:                   well developed          

                          2010                   None                

 

                    Full Exam - General                   Constitutional        

            general 

appearance                   Overall:                   in no acute distress    

                          2010                   None                

 

                    Full Exam - General                   Neurologic            

       mental status

                    Overall:                   alert                   

0 

                                        None                

 

                    Full Exam - General                   Cardiovascular        

           

auscultation of heart                   Rate:                     regular rate  

  

                          2010                   None                

 

                    Full Exam - General                   Cardiovascular        

           

auscultation of heart                   Rhythm:                   regular rhythm

                          2010                   None                

 

                    Full Exam - General                   Respiratory           

        auscultation

                          Right middle lung field:                   a normal ex

am      

                          2010                   None                

 

                    Full Exam - General                   Respiratory           

        auscultation

                          Right lower lung field:                   a normal exa

m       

                          2010                   None                

 

                    Full Exam - General                   Cardiovascular        

           

auscultation of heart                   Overall:                   regular rate 

                          2010                   None                

 

                    Full Exam - General                   Cardiovascular        

           

auscultation of heart                   Overall:                   normal heart 

sounds                    2010                   None                

 

                    Full Exam - General                   Cardiovascular        

           

auscultation of heart                   Overall:                   no murmurs   

                          2010                   None                

 

                    Full Exam - General                   Cardiovascular        

           

auscultation of heart                   S1:                       a normal exam 

    

                          2010                   None                

 

                    Full Exam - General                   Cardiovascular        

           

auscultation of heart                   S2:                       a normal exam 

    

                          2010                   None                

 

                    Full Exam - General                   Cardiovascular        

           

auscultation of heart                   S3 (ventricular gallop):                

                    present                   2010                   None 

               

 

                    Full Exam - General                   Abdomen               

    abdominal exam  

                    Overall:                   no tenderness                   

2010                              None                

 

                    Full Exam - General                   Neurologic            

       mental status

                    Overall:                   oriented                   

2010                              None                

 

                    Full Exam - General                   Psychiatric           

        mood and 

affect                    Overall:                   normal mood and affect     

 

                          2010                   None                

 

                    Full Exam - General                   Abdomen               

    abdominal exam  

                    Overall:                   no masses                   

2010                              None                

 

                    Full Exam - General                   Abdomen               

    abdominal exam  

                          Overall:                   normal bowel sounds        

          

                          2010                   None                

 

                    Full Exam - General                   Abdomen               

    abdominal exam  

                    Overall:                   soft                   2010

    

                                        None                

 

                    Full Exam - General                   Respiratory           

        auscultation

                          Overall:                   breath sounds clear bilater

ally    

                          2010                   None                

 

                    Full Exam - General                   Respiratory           

        auscultation

                    Diffuse:                   a normal exam                   

2010                              None                

 

                    Full Exam - General                   Respiratory           

        auscultation

                          Left upper lung field:                   a normal exam

        

                          2010                   None                

 

                    Full Exam - General                   Respiratory           

        auscultation

                          Left lower lung field:                   a normal exam

        

                          2010                   None                

 

                    Full Exam - General                   Respiratory           

        auscultation

                          Right upper lung field:                   a normal exa

m       

                          2010                   None                

 

                    Full Exam - General                   Neurologic            

       mental status

                    Overall:                   oriented                   

2010                              None                

 

                    Full Exam - General                   Psychiatric           

        mood and 

affect                   Mood:                   depressed                   

2010                              tearful                

 

                    Full Exam - General                   Neurologic            

       mental status

                    Overall:                   alert                   

0 

                                        None                

 

                    Full Exam - General                   Cardiovascular        

           

auscultation of heart                   Rate:                     regular rate  

  

                          2010                   None                

 

                    Full Exam - General                   Cardiovascular        

           

auscultation of heart                   Rhythm:                   regular rhythm

                          2010                   None                

 

                    Full Exam - General                   Cardiovascular        

           

auscultation of heart                   S1:                       a normal exam 

    

                          2010                   None                

 

                    Full Exam - General                   Cardiovascular        

           

auscultation of heart                   S2:                       a normal exam 

    

                          2010                   None                

 

                    Full Exam - General                   Cardiovascular        

           

auscultation of heart                   S3 (ventricular gallop):                

                    present                   2010                   None 

               

 

                    Full Exam - General                   Cardiovascular        

           

extremities                   Overall:                   no clubbing            

                          2010                   None                

 

                    Full Exam - General                   Cardiovascular        

           

extremities                   Overall:                   No edema               

                          2010                   None                

 

                    Full Exam - General                   Cardiovascular        

           

extremities                   Overall:                   No cyanosis            

                          2010                   None                

 

                    Full Exam - General                   Psychiatric           

        mood and 

affect                   Mood:                   anxious                   

2010                              ringing hands                

 

                    Full Exam - General                   Psychiatric           

        mood and 

affect                    Affect:                   constricted                 

 

                          2010                   None                

 

                    Full Exam - General                   Respiratory           

        auscultation

                          Overall:                   breath sounds clear bilater

ally    

                          2010                   None                

 

                    Full Exam - General                   Respiratory           

        auscultation

                    Diffuse:                   a normal exam                   

2010                              None                

 

                    Full Exam - General                   Respiratory           

        auscultation

                          Left upper lung field:                   a normal exam

        

                          2010                   None                

 

                    Full Exam - General                   Respiratory           

        auscultation

                          Left lower lung field:                   a normal exam

        

                          2010                   None                

 

                    Full Exam - General                   Respiratory           

        auscultation

                          Right upper lung field:                   a normal exa

m       

                          2010                   None                

 

                    Full Exam - General                   Respiratory           

        auscultation

                          Right middle lung field:                   a normal ex

am      

                          2010                   None                

 

                    Full Exam - General                   Respiratory           

        auscultation

                          Right lower lung field:                   a normal exa

m       

                          2010                   None                

 

                    Full Exam - General                   Cardiovascular        

           

auscultation of heart                   Overall:                   regular rate 

                          2010                   None                

 

                    Full Exam - General                   Cardiovascular        

           

auscultation of heart                   Overall:                   normal heart 

sounds                    2010                   None                

 

                    Full Exam - General                   Cardiovascular        

           

auscultation of heart                   Overall:                   no murmurs   

                          2010                   None                

 

                    Full Exam - General                   Constitutional        

            general 

appearance                   Overall:                   well nourished          

                          2010                   None                

 

                    Full Exam - General                   Constitutional        

            general 

appearance                   Overall:                   well developed          

                          2010                   None                

 

                    Full Exam - General                   Constitutional        

            general 

appearance                   Overall:                   in no acute distress    

                          2010                   None                

 

                    Full Exam - General                   Psychiatric           

        mood and 

affect                    Overall:                   normal mood and affect     

 

                          2010                   None                

 

                    Full Exam - General                   Respiratory           

        auscultation

                          Right middle lung field:                   a normal ex

am      

                          2010                   None                

 

                    Full Exam - General                   Abdomen               

    abdominal exam  

                    Overall:                   no masses                   

2010                              None                

 

                    Full Exam - General                   Abdomen               

    abdominal exam  

                          Overall:                   normal bowel sounds        

          

                          2010                   None                

 

                    Full Exam - General                   Constitutional        

            general 

appearance                   Overall:                   well nourished          

                          2010                   None                

 

                    Full Exam - General                   Constitutional        

            general 

appearance                   Overall:                   well developed          

                          2010                   None                

 

                    Full Exam - General                   Constitutional        

            general 

appearance                   Overall:                   in no acute distress    

                          2010                   None                

 

                    Full Exam - General                   Respiratory           

        auscultation

                          Overall:                   breath sounds clear bilater

ally    

                          2010                   None                

 

                    Full Exam - General                   Respiratory           

        auscultation

                    Diffuse:                   a normal exam                   

2010                              None                

 

                    Full Exam - General                   Respiratory           

        auscultation

                          Left upper lung field:                   a normal exam

        

                          2010                   None                

 

                    Full Exam - General                   Respiratory           

        auscultation

                          Left lower lung field:                   a normal exam

        

                          2010                   None                

 

                    Full Exam - General                   Respiratory           

        auscultation

                          Right upper lung field:                   a normal exa

m       

                          2010                   None                

 

                    Full Exam - General                   Respiratory           

        auscultation

                          Right lower lung field:                   a normal exa

m       

                          2010                   None                

 

                    Full Exam - General                   Cardiovascular        

           

auscultation of heart                   Overall:                   regular rate 

                          2010                   None                

 

                    Full Exam - General                   Cardiovascular        

           

auscultation of heart                   Overall:                   normal heart 

sounds                    2010                   None                

 

                    Full Exam - General                   Cardiovascular        

           

auscultation of heart                   Overall:                   no murmurs   

                          2010                   None                

 

                    Full Exam - General                   Cardiovascular        

           

auscultation of heart                   Rate:                     regular rate  

  

                          2010                   None                

 

                    Full Exam - General                   Cardiovascular        

           

auscultation of heart                   Rhythm:                   regular rhythm

                          2010                   None                

 

                    Full Exam - General                   Cardiovascular        

           

auscultation of heart                   S1:                       a normal exam 

    

                          2010                   None                

 

                    Full Exam - General                   Abdomen               

    abdominal exam  

                    Overall:                   soft                   2010

    

                                        None                

 

                    Full Exam - General                   Abdomen               

    abdominal exam  

                          Epigastric:                   tender to palpation     

          

                          2010                   None                

 

                    Full Exam - General                   Neurologic            

       mental status

                    Overall:                   alert                   

0 

                                        None                

 

                    Full Exam - General                   Neurologic            

       mental status

                    Overall:                   oriented                   

2010                              None                

 

                    Full Exam - General                   Cardiovascular        

           

auscultation of heart                   S2:                       a normal exam 

    

                          2010                   None                

 

                    Full Exam - General                   Cardiovascular        

           

extremities                   Overall:                   no clubbing            

                          2010                   None                

 

                    Full Exam - General                   Cardiovascular        

           

extremities                   Overall:                   No edema               

                          2010                   None                

 

                    Full Exam - General                   Cardiovascular        

           

extremities                   Overall:                   No cyanosis            

                          2010                   None                

 

                    Full Exam - General                   Constitutional        

            general 

appearance                   Overall:                   well nourished          

                          2010                   None                

 

                    Full Exam - General                   Constitutional        

            general 

appearance                   Overall:                   well developed          

                          2010                   None                

 

                    Full Exam - General                   Constitutional        

            general 

appearance                   Overall:                   in no acute distress    

                          2010                   None                

 

                    Full Exam - General                   Respiratory           

        auscultation

                          Overall:                   breath sounds clear bilater

ally    

                          2010                   None                

 

                    Full Exam - General                   Respiratory           

        auscultation

                    Diffuse:                   a normal exam                   

2010                              None                

 

                    Full Exam - General                   Respiratory           

        auscultation

                          Left upper lung field:                   a normal exam

        

                          2010                   None                

 

                    Full Exam - General                   Respiratory           

        auscultation

                          Left lower lung field:                   a normal exam

        

                          2010                   None                

 

                    Full Exam - General                   Respiratory           

        auscultation

                          Right upper lung field:                   a normal exa

m       

                          2010                   None                

 

                    Full Exam - General                   Respiratory           

        auscultation

                          Right middle lung field:                   a normal ex

am      

                          2010                   None                

 

                    Full Exam - General                   Respiratory           

        auscultation

                          Right lower lung field:                   a normal exa

m       

                          2010                   None                

 

                    Full Exam - General                   Cardiovascular        

           

auscultation of heart                   Overall:                   regular rate 

                          2010                   None                

 

                    Full Exam - General                   Cardiovascular        

           

auscultation of heart                   Overall:                   normal heart 

sounds                    2010                   None                

 

                    Full Exam - General                   Cardiovascular        

           

auscultation of heart                   Murmur:                   previously 

known murmur unchanged                   2010                   None      

         

 

                    Full Exam - General                   Cardiovascular        

           

extremities                   Overall:                   no clubbing            

                          2010                   None                

 

                    Full Exam - General                   Cardiovascular        

           

extremities                   Overall:                   No cyanosis            

                          2010                   None                

 

                    Full Exam - General                   Cardiovascular        

           

extremities                   Edema present:                   severity 1+ - 4+:

trace                     2010                   None                

 

                    Full Exam - General                   Abdomen               

    abdominal exam  

                    Overall:                   no masses                   

2010                              None                

 

                    Full Exam - General                   Abdomen               

    abdominal exam  

                    Overall:                   no tenderness                   

2010                              None                

 

                    Full Exam - General                   Abdomen               

    abdominal exam  

                          Overall:                   normal bowel sounds        

          

                          2010                   None                

 

                    Full Exam - General                   Abdomen               

    abdominal exam  

                    Overall:                   soft                   2010

    

                                        None                

 

                    Full Exam - General                   Neurologic            

       mental status

                    Overall:                   alert                   

0 

                                        None                

 

                    Full Exam - General                   Neurologic            

       mental status

                    Overall:                   oriented                   

2010                              None                

 

                    Full Exam - General                   Psychiatric           

        mood and 

affect                   Affect:                   flat                   

2010                              None                



                                                                              



Procedures

                      



                    Procedure                   Codes                   Date    

            

 

                                              ROUTINE VENIPUNCTURE              

                       

CPT-4: 72238                            06/10/2019                

 

                                                            ASSAY THYROID STIM H

ORMONE                                  

                          CPT-4: 75853                   06/10/2019             

   

 

                                                            COMPREHEN METABOLIC 

PANEL                                   

                          CPT-4: 81633                   06/10/2019             

   

 

                                                            COMPLETE CBC W/AUTO 

DIFF WBC                                

                          CPT-4: 04003                   06/10/2019             

   

 

                                                            URINALYSIS NONAUTO W

/O SCOPE                                

                          CPT-4: 62786                   2019             

   

 

                                                            URINE CULTURE/ COLON

Y COUNT                                 

                          CPT-4: 91596                   2019             

   

 

                                                            URINE CULTURE/ COLON

Y COUNT                                 

                          CPT-4: 20292                   10/02/2018             

   

 

                                              ROUTINE VENIPUNCTURE              

                       

CPT-4: 12150                            2018                

 

                                              ASSAY OF FREE THYROXINE           

                          

CPT-4: 76326                            2018                

 

                                                            ASSAY THYROID STIM H

ORMONE                                  

                          CPT-4: 60094                   2018             

   

 

                                                            COMPLETE CBC W/AUTO 

DIFF WBC                                

                          CPT-4: 42251                   2018             

   

 

                                                            METABOLIC PANEL TOTA

L CA                                    

                          CPT-4: 39552                   2018             

   

 

                                                            FLU VACC PRSV FREE I

NC ANTIG 65 AND OLDER                   

                          CPT-4: 14262                   2018             

   

 

                                                            ASSAY, GLUCOSE, BLOO

D QUANT                                 

                          CPT-4: 19180                   2018             

   

 

                                                            ADMIN INFLUENZA VIRU

S VAC                                   

                          CPT-4:                    2018             

   

 

                                              ROUTINE VENIPUNCTURE              

                       

CPT-4: 27096                            2018                

 

                                                            COMPREHEN METABOLIC 

PANEL                                   

                          CPT-4: 87342                   2018             

   

 

                                                            COMPLETE CBC W/AUTO 

DIFF WBC                                

                          CPT-4: 17209                   2018             

   

 

                                              A1C HPLC                          

           CPT-4: 06743   

                                        2018                

 

                                              ASSAY OF TROPONIN QUANT           

                          

CPT-4: 92898                            2018                

 

                                              LIPID PANEL                       

              CPT-4: 71542

                                        2018                

 

                                                            THER/PROPH/DIAG INJ 

SC/IM                                   

                          CPT-4: 17104                   2018             

   

 

                                                            TRIAMCINOLONE ACET I

N[place] NOS                                  

                          CPT-4:                    2018             

   

 

                                                            URINALYSIS NONAUTO W

/O SCOPE                                

                          CPT-4: 10965                   2018             

   

 

                                                            URINE CULTURE/ COLON

Y COUNT                                 

                          CPT-4: 49564                   2018             

   

 

                                                            FLU VACC PRSV FREE I

NC ANTIG 65 AND OLDER                   

                          CPT-4: 42224                   10/06/2017             

   

 

                                                            ADMIN INFLUENZA VIRU

S VAC                                   

                          CPT-4:                    10/06/2017             

   

 

                                              ROUTINE VENIPUNCTURE              

                       

CPT-4: 15041                            2017                

 

                                                            COMPREHEN METABOLIC 

PANEL                                   

                          CPT-4: 74821                   2017             

   

 

                                                            COMPLETE CBC W/AUTO 

DIFF WBC                                

                          CPT-4: 55847                   2017             

   

 

                                              LIPID PANEL                       

              CPT-4: 22523

                                        2017                

 

                                              A1C HPLC                          

           CPT-4: 00242   

                                        2017                

 

                                                            ASSAY THYROID STIM H

ORMONE                                  

                          CPT-4: 84319                   2017             

   

 

                                              ROUTINE VENIPUNCTURE              

                       

CPT-4: 35786                            2017                

 

                                                            ASSAY THYROID STIM H

ORMONE                                  

                          CPT-4: 90482                   2017             

   

 

                                                            COMPREHEN METABOLIC 

PANEL                                   

                          CPT-4: 20326                   2017             

   

 

                                                            COMPLETE CBC W/AUTO 

DIFF WBC                                

                          CPT-4: 75694                   2017             

   

 

                                              A1C HPLC                          

           CPT-4: 66937   

                                        2017                

 

                                                            FLU VACC PRSV FREE I

NC ANTIG 64 AND OLDER                   

                          CPT-4: 70702                   10/07/2016             

   

 

                                                            ADMIN INFLUENZA VIRU

S VAC                                   

                          CPT-4:                    10/07/2016             

   

 

                                              ROUTINE VENIPUNCTURE              

                       

CPT-4: 85753                            2016                

 

                                              ASSAY OF FREE THYROXINE           

                          

CPT-4: 64926                            2016                

 

                                                            ASSAY THYROID STIM H

ORMONE                                  

                          CPT-4: 42829                   2016             

   

 

                                                            COMPREHEN METABOLIC 

PANEL                                   

                          CPT-4: 08858                   2016             

   

 

                                                            COMPLETE CBC W/AUTO 

DIFF WBC                                

                          CPT-4: 08788                   2016             

   

 

                                              LIPID PANEL                       

              CPT-4: 78260

                                        2016                

 

                                              A1C HPLC                          

           CPT-4: 64538   

                                        2016                

 

                                                            URINALYSIS NONAUTO W

/O SCOPE                                

                          CPT-4: 77719                   2016             

   

 

                                              ROUTINE VENIPUNCTURE              

                       

CPT-4: 34392                            2015                

 

                                                            METABOLIC PANEL TOTA

L CA                                    

                          CPT-4: 88182                   2015             

   

 

                                                            PRESCRIP TRANSMIT VI

A ERX SY                                

                          CPT-4:                    2015             

   

 

                                                            FLU VACC PRSV FREE I

NC ANTIG 64 AND OLDER                   

                          CPT-4: 41788                   10/16/2015             

   

 

                                                            ADMIN INFLUENZA VIRU

S VAC                                   

                          CPT-4:                    10/16/2015             

   

 

                                                            URINALYSIS NONAUTO W

/O SCOPE                                

                          CPT-4: 71616                   09/15/2015             

   

 

                                                            URINE CULTURE/ COLON

Y COUNT                                 

                          CPT-4: 39151                   09/15/2015             

   

 

                                              ROUTINE VENIPUNCTURE              

                       

CPT-4: 19639                            09/10/2015                

 

                                              ASSAY OF FREE THYROXINE           

                          

CPT-4: 39622                            09/10/2015                

 

                                                            ASSAY THYROID STIM H

ORMONE                                  

                          CPT-4: 95064                   09/10/2015             

   

 

                                                            COMPREHEN METABOLIC 

PANEL                                   

                          CPT-4: 58565                   09/10/2015             

   

 

                                                            COMPLETE CBC W/AUTO 

DIFF WBC                                

                          CPT-4: 77103                   09/10/2015             

   

 

                                              LIPID PANEL                       

              CPT-4: 52091

                                        09/10/2015                

 

                                              A1C HPLC                          

           CPT-4: 06719   

                                        09/10/2015                

 

                                              CERUM REMOVAL                     

                CPT-4: 

34055                                   2015                

 

                                                            PRESCRIP TRANSMIT VI

A ERX SY                                

                          CPT-4:                    2015             

   

 

                                              FLUZONE, 5ML (Medicare)           

                          

CPT-4:                             10/17/2014                

 

                                                            ADMIN INFLUENZA VIRU

S VAC                                   

                          CPT-4:                    10/17/2014             

   

 

                                                            PRESCRIP TRANSMIT VI

A ERX SY                                

                          CPT-4:                    2014             

   

 

                                                            PRESCRIP TRANSMIT VI

A ERX SY                                

                          CPT-4:                    2014             

   

 

                                                            PRESCRIP TRANSMIT VI

A ERX SY                                

                          CPT-4:                    2014             

   

 

                                              ROUTINE VENIPUNCTURE              

                       

CPT-4: 49749                            2014                

 

                                              ASSAY OF FREE THYROXINE           

                          

CPT-4: 17038                            2014                

 

                                                            ASSAY THYROID STIM H

ORMONE                                  

                          CPT-4: 63103                   2014             

   

 

                                                            COMPREHEN METABOLIC 

PANEL                                   

                          CPT-4: 48587                   2014             

   

 

                                                            COMPLETE CBC W/AUTO 

DIFF WBC                                

                          CPT-4: 66653                   2014             

   

 

                                              LIPID PANEL                       

              CPT-4: 00732

                                        2014                

 

                                              A1C HPLC                          

           CPT-4: 31024   

                                        2014                

 

                                              VITAMIN B 12 FOLIC ACID           

                          

CPT-4: 56508|40100                      2014                

 

                                                            PRESCRIP TRANSMIT VI

A ERX SY                                

                          CPT-4:                    2014             

   

 

                                                            PRESCRIP TRANSMIT VI

A ERX SY                                

                          CPT-4:                    10/15/2013             

   

 

                                              FLUZONE, 5ML (Medicare)           

                          

CPT-4:                             2013                

 

                                                            ADMIN INFLUENZA VIRU

S VAC                                   

                          CPT-4:                    2013             

   

 

                                                            PRESCRIP TRANSMIT VI

A ERX SY                                

                          CPT-4:                    2013             

   

 

                                                            PRESCRIP TRANSMIT VI

A ERX SY                                

                          CPT-4:                    05/10/2013             

   

 

                                              ROUTINE VENIPUNCTURE              

                       

CPT-4: 60675                            2012                

 

                                              ASSAY OF FREE THYROXINE           

                          

CPT-4: 42127                            2012                

 

                                                            ASSAY THYROID STIM H

ORMONE                                  

                          CPT-4: 55903                   2012             

   

 

                                                            COMPREHEN METABOLIC 

PANEL                                   

                          CPT-4: 88358                   2012             

   

 

                                                            COMPLETE CBC W/AUTO 

DIFF WBC                                

                          CPT-4: 72424                   2012             

   

 

                                              LIPID PANEL                       

              CPT-4: 68698

                                        2012                

 

                                                            A1C GLYCOSYLATED HEM

OGLOBIN TEST                            

                          CPT-4: 92497                   2012             

   

 

                                              CERUM REMOVAL                     

                CPT-4: 

17308                                   2012                

 

                                                            PRESCRIP TRANSMIT VI

A ERX SY                                

                          CPT-4:                    2012             

   

 

                                                            PRESCRIP TRANSMIT VI

A ERX SY                                

                          CPT-4:                    10/10/2012             

   

 

                                              FLUZONE, 5ML (Medicare)           

                          

CPT-4:                             2012                

 

                                                            ADMIN INFLUENZA VIRU

S VAC                                   

                          CPT-4:                    2012             

   

 

                                                            ASSAY, GLUCOSE, BLOO

D QUANT                                 

                          CPT-4: 03143                   2012             

   

 

                                                            URINALYSIS NONAUTO W

/O SCOPE                                

                          CPT-4: 72699                   2012             

   

 

                                                            URINE CULTURE/ COLON

Y COUNT                                 

                          CPT-4: 89764                   2012             

   

 

                                              ROUTINE VENIPUNCTURE              

                       

CPT-4: 01355                            2012                

 

                                              ASSAY OF FREE THYROXINE           

                          

CPT-4: 57753                            2012                

 

                                                            ASSAY THYROID STIM H

ORMONE                                  

                          CPT-4: 12801                   2012             

   

 

                                                            COMPREHEN METABOLIC 

PANEL                                   

                          CPT-4: 43104                   2012             

   

 

                                                            COMPLETE CBC W/AUTO 

DIFF WBC                                

                          CPT-4: 61572                   2012             

   

 

                                              LIPID PANEL                       

              CPT-4: 72234

                                        2012                

 

                                              ASSAY OF INSULIN                  

                   CPT-4: 

97897                                   2012                

 

                                                            A1C GLYCOSYLATED HEM

OGLOBIN TEST                            

                          CPT-4: 85601                   2012             

   

 

                                                            DRAIN/INJECT JOINT/B

URSA                                    

                          CPT-4: 71545                   2012             

   

 

                                                            METHYLPREDNISOLONE 4

0 MG INJ                                

                          CPT-4:                    2012             

   

 

                                                            TRIAMCINOLONE ACET I

[place] NOS                                  

                          CPT-4:                    2012             

   

 

                                                            PRESCRIP TRANSMIT VI

A ERX SY                                

                          CPT-4:                    2012             

   

 

                                                            PRESCRIP TRANSMIT VI

A ERX SY                                

                          CPT-4:                    2012             

   

 

                                                            METHYLPREDNISOLONE 4

0 MG INJ                                

                          CPT-4:                    2012             

   

 

                                                            DRAIN/INJECT JOINT/B

URSA                                    

                          CPT-4: 45231                   2012             

   

 

                                                            TRIAMCINOLONE ACET I

[place] NOS                                  

                          CPT-4:                    2012             

   

 

                                                            PRESCRIP TRANSMIT VI

A ERX SY                                

                          CPT-4:                    2012             

   

 

                                              ROUTINE VENIPUNCTURE              

                       

CPT-4: 30318                            2012                

 

                                              ASSAY OF FREE THYROXINE           

                          

CPT-4: 99489                            2012                

 

                                                            ASSAY THYROID STIM H

ORMONE                                  

                          CPT-4: 36334                   2012             

   

 

                                                            COMPREHEN METABOLIC 

PANEL                                   

                          CPT-4: 82208                   2012             

   

 

                                                            COMPLETE CBC W/AUTO 

DIFF WBC                                

                          CPT-4: 91918                   2012             

   

 

                                              LIPID PANEL                       

              CPT-4: 38560

                                        2012                

 

                                                            PRESCRIP TRANSMIT VI

A ERX SY                                

                          CPT-4:                    2012             

   

 

                                              CERUM REMOVAL                     

                CPT-4: 

55854                                   2011                

 

                                                            PRESCRIP TRANSMIT VI

A ERX SY                                

                          CPT-4:                    2011             

   

 

                                              FLUZONE, 5ML (Medicare)           

                          

CPT-4:                             10/05/2011                

 

                                                            ADMIN INFLUENZA VIRU

S VAC                                   

                          CPT-4:                    10/05/2011             

   

 

                                                            PRESCRIP TRANSMIT VI

A ERX SY                                

                          CPT-4:                    2011             

   

 

                                                            URINALYSIS NONAUTO W

/O SCOPE                                

                          CPT-4: 27278                   2011             

   

 

                                                            URINE CULTURE/ COLON

Y COUNT                                 

                          CPT-4: 81760                   2011             

   

 

                                              CUR TOBACCO NON-USER              

                       

CPT-4:                             2011                

 

                                              ROUTINE VENIPUNCTURE              

                       

CPT-4: 53218                            2011                

 

                                                            COMPLETE CBC W/AUTO 

DIFF WBC                                

                          CPT-4: 71638                   2011             

   

 

                                                            COMPREHEN METABOLIC 

PANEL                                   

                          CPT-4: 29841                   2011             

   

 

                                              LIPID PANEL                       

              CPT-4: 52549

                                        2011                

 

                                                            ASSAY THYROID STIM H

ORMONE                                  

                          CPT-4: 12911                   2011             

   

 

                                              ASSAY OF FREE THYROXINE           

                          

CPT-4: 42375                            2011                

 

                                                            PRESCRIP TRANSMIT VI

A ERX SY                                

                          CPT-4:                    2011             

   

 

                                                            INJ TRIGGER POINT 1/

2 MUSCL                                 

                          CPT-4: 81974                   2011             

   

 

                                                            TRIAMCINOLONE ACET I

[place] NOS                                  

                          CPT-4:                    2011             

   

 

                                                            METHYLPREDNISOLONE 4

0 MG INJ                                

                          CPT-4:                    2011             

   

 

                                                            THER/PROPH/DIAG INJ 

SC/IM                                   

                          CPT-4: 33842                   2011             

   

 

                                                            KETOROLAC TROMETHAMI

NE INJ                                  

                          CPT-4:                    2011             

   

 

                                                            PRESCRIP TRANSMIT VI

A ERX SY                                

                          CPT-4:                    2010             

   

 

                                                            FLU VACCINE 3 YRS & 

> IM UP 63                              

                          CPT-4: 86686                   10/06/2010             

   

 

                                                            ADMIN INFLUENZA VIRU

S VAC                                   

                          CPT-4:                    10/06/2010             

   

 

                                                            URINALYSIS NONAUTO W

/O SCOPE                                

                          CPT-4: 12743                   2010             

   

 

                                                            URINE CULTURE/ COLON

Y COUNT                                 

                          CPT-4: 44665                   2010             

   

 

                                                            PRESCRIP TRANSMIT VI

A ERX SY                                

                          CPT-4:                    2010             

   

 

                                                            THER/PROPH/DIAG INJ 

SC/IM                                   

                          CPT-4: 92343                   2010             

   

 

                                              VITAMIN B12 INJECTION             

                        

CPT-4:                             2010                

 

                                                            THER/PROPH/DIAG INJ 

SC/IM                                   

                          CPT-4: 71017                   2010             

   

 

                                              VITAMIN B12 INJECTION             

                        

CPT-4:                             2010                

 

                                              ROUTINE VENIPUNCTURE              

                       

CPT-4: 94358                            2010                



                                                                                
                                                                                
                                                                                
                                                                                
                                                                                
                                                                                
                                                                                
                                                                                
                                                                                
                                                                                
                                                                                
                                                                                
                                                                                
                                                                                
                                                                                
                                                                                
                                                                                
                                                                                
                                                                                
                                                           



Vital Signs

                      



                          Date                      Vital                

 

                          2019                                       Blood

 Pressure 1: 140/70 Code: 

8480-6                                                         Heart Rate 1: 57 

bpm                                                         SpO2: 99%           

                                                            Temperature: 35.9 (C

) / 96.6 (F)   

                                                            Weight: 215 lbs     

       

                      

 

                          06/10/2019                                       Blood

 Pressure 1: 144/70 Code: 

8480-6                                                         Heart Rate 1: 60 

bpm                                                         Respiratory Rate: 20

bpm                                                         SpO2: 95%           

                                                            Temperature: 36.4 (C

) / 97.6 (F)   

                                                            Weight: 214 lbs     

       

                      

 

                          2019                                       Blood

 Pressure 1: 128/78 Code: 

8480-6                                                         Heart Rate 1: 56 

bpm                                                         Respiratory Rate: 20

bpm                                                         SpO2: 98%           

                                                            Temperature: 36.3 (C

) / 97.4 (F)   

                                                            Weight: 213 lbs     

       

                      

 

                          2018                                       Blood

 Pressure 1: 142/60 Code: 

8480-6                                                         BMI: 38.2 Code: 

41157-2                                                         Heart Rate 1: 48

bpm                                                         Height: 5'2"        

                                                            Respiratory Rate: 20

 bpm        

                                                            SpO2: 98%           

            

                                                            Temperature: 36.7 (C

) / 98.1 (F)               

                                                            Weight: 212 lbs     

                   

          

 

                          2018                                       Blood

 Pressure 1: 142/64 Code: 

8480-6                                                         BMI: 38.5 Code: 

76004-8                                                         Heart Rate 1: 52

bpm                                                         Height: 5'2"        

                                                            Respiratory Rate: 22

 bpm        

                                                            SpO2: 96%           

            

                                                            Temperature: 36.1 (C

) / 96.9 (F)               

                                                            Weight: 214 lbs     

                   

          

 

                          10/02/2018                                       Blood

 Pressure 1: 124/80 Code: 

8480-6                                                         BMI: 38.3 Code: 

59987-9                                                         Heart Rate 1: 68

bpm                                                         Height: 5'2"        

                                                            Respiratory Rate: 20

 bpm        

                                                            Temperature: 36.3 (C

) / 97.4 (F)

                                                            Weight: 213 lbs     

    

                         

 

                          2018                                       Blood

 Pressure 1: 132/78 Code: 

8480-6                                                         BMI: 37.6 Code: 

08898-5                                                         Heart Rate 1: 68

bpm                                                         Height: 5'2"        

                                                            Respiratory Rate: 20

 bpm        

                                                            SpO2: 97%           

            

                                                            Temperature: 36.8 (C

) / 98.2 (F)               

                                                            Weight: 209 lbs     

                   

          

 

                          2018                                       Blood

 Pressure 1: 150/76 Code: 

8480-6                                                         BMI: 38.5 Code: 

49911-4                                                         Heart Rate 1: 64

bpm                                                         Height: 5'2"        

                                                            Respiratory Rate: 20

 bpm        

                                                            SpO2: 97%           

            

                                                            Temperature: 36.2 (C

) / 97.2 (F)               

                                                            Weight: 214 lbs     

                   

          

 

                          2018                                       Blood

 Pressure 1: 122/74 Code: 

8480-6                                                         BMI: 38.2 Code: 

63538-0                                                         Heart Rate 1: 64

bpm                                                         Height: 5'2"        

                                                            Respiratory Rate: 18

 bpm        

                                                            SpO2: 96%           

            

                                                            Temperature: 35.8 (C

) / 96.4 (F)               

                                                            Weight: 212 lbs     

                   

          

 

                          2018                                       Blood

 Pressure 1: 124/78 Code: 

8480-6                                                         BMI: 37.8 Code: 

65522-6                                                         Heart Rate 1: 76

bpm                                                         Height: 5'2"        

                                                            Respiratory Rate: 20

 bpm        

                                                            Temperature: 36.8 (C

) / 98.3 (F)

                                                            Weight: 210 lbs     

    

                         

 

                          2018                                       Blood

 Pressure 1: 136/70 Code: 

8480-6                                                         BMI: 38.0 Code: 

12058-3                                                         Heart Rate 1: 68

bpm                                                         Height: 5'2"        

                                                            Respiratory Rate: 20

 bpm        

                                                            SpO2: 97%           

            

                                                            Temperature: 36.8 (C

) / 98.2 (F)               

                                                            Weight: 211 lbs     

                   

          

 

                          2018                                       Blood

 Pressure 1: 140/65 Code: 

8480-6                                                         Heart Rate 1: 75 

bpm                                                         Respiratory Rate: 24

bpm                                                         SpO2: 95%           

                                                            Temperature: 37.0 (C

) / 98.6 (F)   

                                                            Weight: 211 lbs     

       

                      

 

                          2018                                       Blood

 Pressure 1: 154/70 Code: 

8480-6                                                         BMI: 37.6 Code: 

43639-7                                                         Heart Rate 1: 76

bpm                                                         Height: 5'2"        

                                                            Respiratory Rate: 20

 bpm        

                                                            SpO2: 98%           

            

                                                            Temperature: 36.9 (C

) / 98.5 (F)               

                                                            Weight: 209 lbs     

                   

          

 

                          2017                                       Blood

 Pressure 1: 156/70 Code: 

8480-6                                                         BMI: 37.1 Code: 

19725-2                                                         Heart Rate 1: 72

bpm                                                         Height: 5'2"        

                                                            Respiratory Rate: 20

 bpm        

                                                            SpO2: 97%           

            

                                                            Temperature: 37.0 (C

) / 98.6 (F)               

                                                            Weight: 206 lbs     

                   

          

 

                          2017                                       Blood

 Pressure 1: 152/78 Code: 

8480-6                                                         BMI: 36.8 Code: 

26220-0                                                         Heart Rate 1: 78

bpm                                                         Height: 5'2"        

                                                            Respiratory Rate: 20

 bpm        

                                                            SpO2: 98%           

            

                                                            Temperature: 36.1 (C

) / 97.0 (F)               

                                                            Weight: 204 lbs     

                   

          

 

                          2017                                       Blood

 Pressure 1: 142/70 Code: 

8480-6                                                         BMI: 36.9 Code: 

90198-3                                                         Heart Rate 1: 64

bpm                                                         Height: 5'2"        

                                                            Respiratory Rate: 20

 bpm        

                                                            SpO2: 96%           

            

                                                            Temperature: 36.5 (C

) / 97.7 (F)               

                                                            Weight: 205 lbs     

                   

          

 

                          2017                                       Blood

 Pressure 1: 142/68 Code: 

8480-6                                                         Heart Rate 1: 88 

bpm                                                         Respiratory Rate: 18

bpm                                                         SpO2: 98%           

                                                            Temperature: 36.3 (C

) / 97.3 (F)   

                                                            Weight: 209 lbs     

       

                      

 

                          2016                                       Blood

 Pressure 1: 130/78 Code: 

8480-6                                                         Heart Rate 1: 68 

bpm                                                         Respiratory Rate: 20

bpm                                                         SpO2: 95%           

                                                            Temperature: 36.4 (C

) / 97.6 (F)   

                                                            Weight: 212 lbs     

       

                      

 

                          2016                                       Blood

 Pressure 1: 122/64 Code: 

8480-6                                                         BMI: 39.1 Code: 

30862-2                                                         Heart Rate 1: 76

bpm                                                         Height: 5'2"        

                                                            Respiratory Rate: 20

 bpm        

                                                            Temperature: 36.8 (C

) / 98.2 (F)

                                                            Weight: 217 lbs     

    

                         

 

                          2016                                       Blood

 Pressure 1: 144/70 Code: 

8480-6                                                         BMI: 39.4 Code: 

82313-2                                                         Heart Rate 1: 76

bpm                                                         Height: 5'2"        

                                                            Respiratory Rate: 20

 bpm        

                                                            Temperature: 36.6 (C

) / 97.9 (F)

                                                            Weight: 219 lbs     

    

                         

 

                          2015                                       Blood

 Pressure 1: 152/60 Code: 

8480-6                                                         BMI: 39.6 Code: 

47374-8                                                         Heart Rate 1: 84

bpm                                                         Height: 5'2"        

                                                            Respiratory Rate: 20

 bpm        

                                                            Temperature: 37.0 (C

) / 98.6 (F)

                                                            Weight: 220 lbs     

    

                         

 

                          2015                                       Blood

 Pressure 1: 146/76 Code: 

8480-6                                                         BMI: 39.8 Code: 

26116-6                                                         Heart Rate 1: 88

bpm                                                         Height: 5'2"        

                                                            Respiratory Rate: 20

 bpm        

                                                            Temperature: 37.0 (C

) / 98.6 (F)

                                                            Weight: 221 lbs     

    

                         

 

                          2015                                       Blood

 Pressure 1: 132/70 Code: 

8480-6                                                         BMI: 39.1 Code: 

95140-3                                                         Heart Rate 1: 88

bpm                                                         Height: 5'2"        

                                                            Respiratory Rate: 20

 bpm        

                                                            Temperature: 36.4 (C

) / 97.6 (F)

                                                            Weight: 217 lbs     

    

                         

 

                          2015                                       Blood

 Pressure 1: 132/66 Code: 

8480-6                                                         BMI: 39.9 Code: 

96287-2                                                         Heart Rate 1: 72

bpm                                                         Height: 5'2"        

                                                            Respiratory Rate: 20

 bpm        

                                                            Temperature: 36.9 (C

) / 98.4 (F)

                                                            Weight: 218 lbs     

    

                         

 

                          2015                                       Blood

 Pressure 1: 136/80 Code: 

8480-6                                                         Heart Rate 1: 76 

bpm                                                         Respiratory Rate: 20

bpm                                                         Temperature: 36.7 

(C) / 98.0 (F)                                                         Weight: 

224 lbs                                   

 

                          2015                                       Blood

 Pressure 1: 134/78 Code: 

8480-6                                                         BMI: 39.7 Code: 

94159-6                                                         Heart Rate 1: 84

bpm                                                         Height: 5'2"        

                                                            Respiratory Rate: 20

 bpm        

                                                            Temperature: 36.7 (C

) / 98.0 (F)

                                                            Weight: 217 lbs     

    

                         

 

                          2014                                       Blood

 Pressure 1: 146/78 Code: 

8480-6                                                         BMI: 39.5 Code: 

23160-7                                                         Heart Rate 1: 82

bpm                                                         Height: 5'2"        

                                                            Respiratory Rate: 18

 bpm        

                                                            Temperature: 35.6 (C

) / 96.1 (F)

                                                            Weight: 216 lbs     

    

                         

 

                          2014                                       Blood

 Pressure 1: 134/70 Code: 

8480-6                                                         BMI: 37.9 Code: 

04399-7                                                         Heart Rate 1: 80

bpm                                                         Height: 5'3"        

                                                            Respiratory Rate: 20

 bpm        

                                                            Temperature: 36.8 (C

) / 98.2 (F)

                                                            Weight: 214 lbs     

    

                         

 

                          2014                                       Blood

 Pressure 1: 132/70 Code: 

8480-6                                                         BMI: 37.6 Code: 

56271-7                                                         Heart Rate 1: 80

bpm                                                         Height: 5'3"        

                                                            Respiratory Rate: 20

 bpm        

                                                            Temperature: 36.8 (C

) / 98.3 (F)

                                                            Weight: 212 lbs     

    

                         

 

                          2014                                       Blood

 Pressure 1: 116/74 Code: 

8480-6                                                         Heart Rate 1: 68 

bpm                                                         Respiratory Rate: 20

bpm                                                         Temperature: 36.2 

(C) / 97.1 (F)                                                         Weight: 

212 lbs                                   

 

                          10/15/2013                                       Blood

 Pressure 1: 132/82 Code: 

8480-6                                                         BMI: 37.4 Code: 

96226-6                                                         Heart Rate 1: 76

bpm                                                         Height: 5'3"        

                                                            Respiratory Rate: 20

 bpm        

                                                            Temperature: 36.7 (C

) / 98.0 (F)

                                                            Weight: 211 lbs     

    

                         

 

                          2013                                       Blood

 Pressure 1: 130/76 Code: 

8480-6                                                         Heart Rate 1: 78 

bpm                                  

 

                          2013                                       Blood

 Pressure 1: 140/82 Code: 

8480-6                                                         BMI: 36.8 Code: 

29981-4                                                         Heart Rate 1: 66

bpm                                                         Height: 5'3"        

                                                            Respiratory Rate: 20

 bpm        

                                                            Temperature: 36.1 (C

) / 96.9 (F)

                                                            Weight: 208 lbs     

    

                         

 

                          2013                                       Blood

 Pressure 1: 138/80 Code: 

8480-6                                                         BMI: 36.4 Code: 

25387-3                                                         Heart Rate 1: 72

bpm                                                         Height: 5'4"        

                                                            Respiratory Rate: 20

 bpm        

                                                            Temperature: 36.7 (C

) / 98.0 (F)

                                                            Weight: 212 lbs     

    

                         

 

                          2013                                       Blood

 Pressure 1: 124/70 Code: 

8480-6                                                         BMI: 36.9 Code: 

74931-6                                                         Heart Rate 1: 60

bpm                                                         Height: 5'4"        

                                                            Temperature: 36.1 (C

) / 97.0 (F)

                                                            Weight: 215 lbs     

    

                         

 

                          05/10/2013                                       Blood

 Pressure 1: 132/86 Code: 

8480-6                                                         BMI: 36.9 Code: 

40182-4                                                         Heart Rate 1: 76

bpm                                                         Height: 5'4"        

                                                            Respiratory Rate: 20

 bpm        

                                                            Temperature: 36.8 (C

) / 98.2 (F)

                                                            Weight: 215 lbs     

    

                         

 

                          2013                                       Blood

 Pressure 1: 134/82 Code: 

8480-6                                                         BMI: 36.6 Code: 

79365-8                                                         Heart Rate 1: 72

bpm                                                         Height: 5'4"        

                                                            Respiratory Rate: 20

 bpm        

                                                            Temperature: 36.3 (C

) / 97.4 (F)

                                                            Weight: 213 lbs     

    

                         

 

                          2012                                       Blood

 Pressure 1: 142/80 Code: 

8480-6                                                         BMI: 37.1 Code: 

71378-6                                                         Heart Rate 1: 76

bpm                                                         Height: 5'4"        

                                                            Respiratory Rate: 20

 bpm        

                                                            Temperature: 36.8 (C

) / 98.3 (F)

                                                            Weight: 216 lbs     

    

                         

 

                          10/23/2012                                       Blood

 Pressure 1: 128/68 Code: 

8480-6                                                         BMI: 37.6 Code: 

39887-9                                                         Heart Rate 1: 72

bpm                                                         Height: 5'4"        

                                                            Respiratory Rate: 20

 bpm        

                                                            Temperature: 36.6 (C

) / 97.9 (F)

                                                            Weight: 219 lbs     

    

                         

 

                          10/10/2012                                       Blood

 Pressure 1: 122/70 Code: 

8480-6                                                         Heart Rate 1: 76 

bpm                                                         Respiratory Rate: 20

bpm                                                         Temperature: 36.7 

(C) / 98.1 (F)                                                         Weight: 

218 lbs                                   

 

                          2012                                       Blood

 Pressure 1: 132/80 Code: 

8480-6                                                         Heart Rate 1: 84 

bpm                                  

 

                          2012                                       Blood

 Pressure 1: 128/78 Code: 

8480-6                                                         BMI: 38.1 Code: 

91439-1                                                         Heart Rate 1: 84

bpm                                                         Height: 5'4"        

                                                            Respiratory Rate: 20

 bpm        

                                                            Temperature: 36.9 (C

) / 98.4 (F)

                                                            Weight: 222 lbs     

    

                         

 

                          2012                                       Blood

 Pressure 1: 138/80 Code: 

8480-6                                                         BMI: 37.9 Code: 

20141-3                                                         Heart Rate 1: 74

bpm                                                         Height: 5'4"        

                                                            Temperature: 36.1 (C

) / 97.0 (F)

                                                            Weight: 221 lbs     

    

                         

 

                          2012                                       Blood

 Pressure 1: 126/78 Code: 

8480-6                                                         BMI: 38.4 Code: 

72616-4                                                         Heart Rate 1: 72

bpm                                                         Height: 5'4"        

                                                            Respiratory Rate: 20

 bpm        

                                                            Temperature: 36.7 (C

) / 98.0 (F)

                                                            Weight: 224 lbs     

    

                         

 

                          2012                                       Blood

 Pressure 1: 138/72 Code: 

8480-6                                                         BMI: 38.4 Code: 

86981-3                                                         Heart Rate 1: 72

bpm                                                         Height: 5'4"        

                                                            Respiratory Rate: 20

 bpm        

                                                            Temperature: 36.6 (C

) / 97.9 (F)

                                                            Weight: 224 lbs     

    

                         

 

                          2012                                       Blood

 Pressure 1: 122/78 Code: 

8480-6                                                         BMI: 38.8 Code: 

06466-7                                                         Heart Rate 1: 88

bpm                                                         Height: 5'4"        

                                                            Respiratory Rate: 20

 bpm        

                                                            Temperature: 36.6 (C

) / 97.8 (F)

                                                            Weight: 226 lbs     

    

                         

 

                          2012                                       Blood

 Pressure 1: 116/60 Code: 

8480-6                                                         BMI: 38.1 Code: 

71343-0                                                         Heart Rate 1: 92

bpm                                                         Height: 5'4"        

                                                            Respiratory Rate: 20

 bpm        

                                                            Temperature: 36.8 (C

) / 98.2 (F)

                                                            Weight: 222 lbs     

    

                         

 

                          2011                                       Blood

 Pressure 1: 118/62 Code: 

8480-6                                                         BMI: 37.9 Code: 

01452-6                                                         Heart Rate 1: 80

bpm                                                         Height: 5'4"        

                                                            Temperature: 36.5 (C

) / 97.7 (F)

                                                            Weight: 221 lbs     

    

                         

 

                          2011                                       Blood

 Pressure 1: 132/70 Code: 

8480-6                                                         Heart Rate 1: 84 

bpm                                                         Respiratory Rate: 20

bpm                                                         Temperature: 36.7 

(C) / 98.0 (F)                                                         Weight: 

221 lbs                                   

 

                          2011                                       Blood

 Pressure 1: 114/72 Code: 

8480-6                                                         BMI: 37.6 Code: 

93324-6                                                         Heart Rate 1: 76

bpm                                                         Height: 5'4"        

                                                            Respiratory Rate: 20

 bpm        

                                                            Temperature: 36.8 (C

) / 98.3 (F)

                                                            Weight: 219 lbs     

    

                         

 

                          2011                                       Blood

 Pressure 1: 136/76 Code: 

8480-6                                                         Temperature: 36.0

(C) / 96.8 (F)                                                         Weight: 

219 lbs 8 oz                                  

 

                          2011                                       Blood

 Pressure 1: 128/80 Code: 

8480-6                                                         Heart Rate 1: 72 

bpm                                                         Temperature: 36.3 

(C) / 97.4 (F)                                                         Weight: 

218 lbs                                   

 

                          2011                                       Blood

 Pressure 1: 126/70 Code: 

8480-6                                                         Heart Rate 1: 72 

bpm                                                         Temperature: 36.7 

(C) / 98.0 (F)                                  

 

                          2010                                       Blood

 Pressure 1: 126/78 Code: 

8480-6                                                         Temperature: 36.2

(C) / 97.1 (F)                                                         Weight: 

217 lbs 8 oz                                  

 

                          2010                                       Blood

 Pressure 1: 116/70 Code: 

8480-6                                                         Heart Rate 1: 72 

bpm                                                         Temperature: 36.4 

(C) / 97.6 (F)                                                         Weight: 

222 lbs                                   

 

                          2010                                       Blood

 Pressure 1: 114/78 Code: 

8480-6                                                         Heart Rate 1: 72 

bpm                                                         Temperature: 37.1 

(C) / 98.8 (F)                                                         Weight: 

225 lbs                                   

 

                          2010                                       Blood

 Pressure 1: 128/78 Code: 

8480-6                                                         Heart Rate 1: 76 

bpm                                                         Temperature: 36.6 

(C) / 97.8 (F)                                                         Weight: 

224 lbs                                   

 

                          2010                                       Blood

 Pressure 1: 116/78 Code: 

8480-6                                                         Heart Rate 1: 80 

bpm                                                         Temperature: 36.4 

(C) / 97.6 (F)                                                         Weight: 

226 lbs                                   

 

                          2010                                       Blood

 Pressure 1: 120/70 Code: 

8480-6                                                         BMI: 38.3 Code: 

28204-0                                                         Heart Rate 1: 88

bpm                                                         Height: 5'5"        

                                                            Temperature: 36.4 (C

) / 97.6 (F)

                                                            Weight: 230 lbs     

    

                         



                                                                                
                                                                                
                                                                                
                                                                                
                                                                                
                                                                                
                                                                                
                                                                                
                    



Functional Status

          No Functional Status data                                             
            



History of Present Illness

                      



                    Symptom Name                   Status                   Resu

lt                  

                          Effective Date                   Notes                

 

                                   Quality                   chronic            

       

06/10/2019                              None                

 

                                   Quality                   stable             

      06/10/2019

                                        None                

 

                                   Quality                   chronic            

       

06/10/2019                              None                

 

                                   Quality                   hesitancy          

         

2019                              None                

 

                                   Quality                   stable             

      2019

                                        None                

 

                                   Quality                   acute              

     2019 

                                        None                

 

                                   Quality                   improving          

         

2019                              back on omeprazole--finished all of H py

saran regimen

but had pain in swelling in feet and hands with protonix                

 

                    gastroesophageal reflux                   Quality           

        

regurgitation of acid                   2018                   None       

        

 

                    gastroesophageal reflux                   Quality           

        chronic     

                          2018                   None                

 

                    chest pain/pressure                   Location              

     in the 

substernal area                   2018                   None             

  

 

                    chest pain/pressure                   Quality               

    stable          

                          2018                   None                

 

                          gastroesophageal reflux                   Onset and Re

solution                  

                    ongoing                   2018                   None 

               

 

                anxiety                   Quality                   acute       

            

2018                              None                

 

                anxiety                   Quality                   agitation   

                

2018                              None                

 

                    anxiety                   Onset and Resolution              

     ongoing        

                          2018                   None                

 

                arrhythmia                   Quality                   acute    

               

2018                              None                

 

                    arrhythmia                   Quality                   inter

mittent             

                          2018                   None                

 

                    arrhythmia                   Onset and Resolution           

        ongoing     

                          2018                   None                

 

                    gastroesophageal reflux                   Quality           

        acute       

                          2018                   None                

 

                    gastroesophageal reflux                   Quality           

        

regurgitation of acid                   2018                   None       

        

 

                          gastroesophageal reflux                   Onset and Re

solution                  

                    ongoing                   2018                   None 

               

 

                    gastroesophageal reflux                   Onset of Symptom  

                 

during adulthood                   2018                   None            

   

 

                    hypertension                   Onset and Resolution         

          ongoing   

                          2018                   None                

 

                    abdominal pain                   Location                   

in the epigastric 

area                      10/02/2018                   None                

 

                    abdominal pain                   Quality                   i

mproving            

                          10/02/2018                   None                

 

                    arrhythmia                   Quality                   irreg

ular beats          

                          10/02/2018                   told holter showed episod

e of atrial 

fibrillation so has to get a stress test done                

 

                    stress                   Exacerbating Factors               

    stressful life 

changes                   10/02/2018                   None                

 

                    anxiety                   Onset and Resolution              

     ongoing        

                          10/02/2018                   None                

 

                fatigue                   Quality                   chronic     

              

10/02/2018                              None                

 

                fatigue                   Quality                   constant    

               

10/02/2018                              None                

 

                    fatigue                   Onset and Resolution              

     ongoing        

                          10/02/2018                   None                

 

                    gastroesophageal reflux                   Quality           

        chronic     

                          2018                   None                

 

                          gastroesophageal reflux                   Onset and Re

solution                  

                    ongoing                   2018                   None 

               

 

                    abdominal pain                   Location                   

in the periumbilical

area                      2018                   None                

 

                    abdominal pain                   Location                   

in the epigastric 

area                      2018                   None                

 

                    abdominal pain                   Quality                   b

urning.             

                          2018                   Patient was in ER on  and was started 

on carafate 1gm QID                

 

                    muscle weakness                   Location                  

 diffusely          

                          2018                   None                

 

                    muscle weakness                   Quality                   

clumsiness          

                          2018                   None                

 

                    muscle weakness                   Quality                   

worsening           

                          2018                   None                

 

                    muscle weakness                   Quality                   

fatigue             

                          2018                   None                

 

                    hypertension                   Quality                   wor

sening              

                          2018                   None                

 

                    headache                   Location                   diffus

ely                 

                          2018                   None                

 

                    dizziness                   Quality                   lighth

eadedness           

                          2018                   None                

 

                    dizziness                   Quality                   [facility]

nce                 

                          2018                   None                

 

                    dizziness                   Onset and Resolution            

       ongoing      

                          2018                   None                

 

                    dizziness                   Onset of Symptom                

   1 1/2 months ago 

                          2018                   None                

 

                    muscle weakness                   Onset and Resolution      

             ongoing

                          2018                   None                

 

                    muscle weakness                   Onset of Symptom          

         1 1/2 

months ago                   2018                   None                

 

                    hypoglycemia                   Quality                   int

ermittent           

                          2018                   None                

 

                    neck pain                   Location                   in th

e posterior area    

                          2018                   None                

 

                    neck pain                   Quality                   improv

ing.                

                          2018                   Patient is still going to

 PT twice weekly and 

home exercises                

 

                    neck pain                   Location                   on th

e right             

                          2018                   None                

 

                    dyspnea                   Quality                   shortnes

s of breath         

                          2018                   None                

 

                    dyspnea                   Quality                   breathle

ssness              

                          2018                   None                

 

                    back pain                   Location                   in th

e right upper back 

area                      2018                   None                

 

                    back pain                   Onset and Resolution            

       ongoing      

                          2018                   None                

 

                back pain                   Quality                   dull      

             

2018                              None                

 

                    back pain                   Quality                   consta

nt                  

                          2018                   None                

 

                    back pain                   Quality                   discom

fort                

                          2018                   None                

 

                    muscle weakness                   Location                  

 diffusely          

                          2018                   None                

 

                    muscle weakness                   Quality                   

lower extremities   

                          2018                   None                

 

                    muscle weakness                   Quality                   

fatigue             

                          2018                   None                

 

                    muscle weakness                   Quality                   

clumsiness          

                          2018                   None                

 

                    muscle weakness                   Quality                   

worsening           

                          2018                   None                

 

                    otalgia                   Location                   on both

 sides              

                          2018                   None                

 

                otalgia                   Quality                   acute       

            

2018                              None                

 

                    otalgia                   Onset and Resolution              

     sudden in onset

                          2018                   None                

 

                    back pain                   Location                   in th

e left lower back 

area                      2018                   None                

 

                    back pain                   Quality                   discom

fort                

                          2018                   None                

 

                    back pain                   Radiating                   the 

inguinal area       

                          2018                   None                

 

                    back pain                   Radiating                   into

 left hip           

                          2018                   None                

 

                    back pain                   Onset of Symptom                

   1 weeks ago      

                          2018                   None                

 

                back pain                   Quality                   sharp     

              

2018                              None                

 

                    back pain                   Quality                   consta

nt                  

                          2018                   None                

 

                back pain                   Quality                   dull      

             

2017                              None                

 

                    back pain                   Quality                   improv

ing.                

                          2017                   Patient has finished PT a

nd continues to do home 

exercises                

 

                back pain                   Quality                   stable    

               

2017                              None                

 

                    back pain                   Location                   lumba

r spine             

                          2017                   None                

 

                    gait abnormality                   Quality                  

 unsteady           

                          2017                   None                

 

                    gait abnormality                   Quality                  

 improving          

                          2017                   None                

 

                    pelvic pain                   Location                   on 

the left            

                          2017                   None                

 

                pelvic pain                   Quality                   acute   

                

2017                              None                

 

                    pelvic pain                   Quality                   achi

ng                  

                          2017                   None                

 

                    pelvic pain                   Quality                   coli

cky                 

                          2017                   None                

 

                    pelvic pain                   Quality                   heav

iness               

                          2017                   None                

 

                    pelvic pain                   Onset and Resolution          

         gradual in 

onset                     2017                   None                

 

                    pelvic pain                   Onset of Symptom              

     2 months ago   

                          2017                   None                

 

                    hyperglycemia                   Quality                   gl

ucose intolerance   

                          2017                   None                

 

                    hyperglycemia                   Quality                   wo

rsening.            

                          2017                   Blood sugars have increas

ed into 110-120's   

            

 

                    hyperglycemia                   Onset of Symptom            

       2-3 months 

ago                       2017                   None                

 

                    breast complaint                   Location                 

  in the left       

                          2017                   None                

 

                    breast complaint                   Location                 

  in the right      

                          2017                   None                

 

                    breast complaint                   Quality                  

 tenderness         

                          2017                   None                

 

                    cough                   Location                   in the th

roat                

                          2017                   None                

 

                cough                   Quality                   acute         

          

2017                              None                

 

                cough                   Quality                   dry           

        

2017                              None                

 

                cough                   Quality                   hacking       

            

2017                              None                

 

                cough                   Quality                   tight         

          

2017                              None                

 

                    cough                   Onset of Symptom                   1

-2 days ago         

                          2017                   None                

 

                    otalgia                   Location                   on both

 sides              

                          2017                   None                

 

                otalgia                   Quality                   acute       

            

2017                              None                

 

                otalgia                   Quality                   throbbing   

                

2017                              None                

 

                    otalgia                   Onset and Resolution              

     sudden in onset

                          2017                   None                

 

                    otalgia                   Onset of Symptom                  

 3-5 days ago       

                          2017                   None                

 

                    chest tightness                   Location                  

 diffusely          

                          2017                   None                

 

                    chest tightness                   Quality                   

acute               

                          2017                   None                

 

                    chest tightness                   Quality                   

dull                

                          2017                   None                

 

                    chest tightness                   Quality                   

pleuritic           

                          2017                   None                

 

                    chest tightness                   Quality                   

piercing            

                          2017                   None                

 

                    chest tightness                   Onset and Resolution      

             sudden 

in onset                   2017                   None                

 

                    chest tightness                   Onset of Symptom          

         3-5 days 

ago                       2017                   None                

 

                cough                   Quality                   productive    

               

2017                              None                

 

                    nasal discharge                   Location                  

 in both  nares     

                          2017                   None                

 

                    nasal discharge                   Quality                   

acute               

                          2017                   None                

 

                    nasal discharge                   Quality                   

green               

                          2017                   None                

 

                    nasal discharge                   Quality                   

thick               

                          2017                   None                

 

                    nasal discharge                   Quality                   

worsening           

                          2017                   None                

 

                    nasal discharge                   Onset of Symptom          

         _ days ago 

                          2017                   None                

 

                    sinus congestion                   Quality                  

 acute              

                          2017                   None                

 

                    sinus congestion                   Quality                  

 fullness           

                          2017                   None                

 

                    sinus congestion                   Quality                  

 pain               

                          2017                   None                

 

                    sinus congestion                   Quality                  

 pressure           

                          2017                   None                

 

                    sinus congestion                   Onset of Symptom         

          _ days ago

                          2017                   None                

 

                    otalgia                   Location                   on the 

right               

                          2016                   None                

 

                otalgia                   Quality                   throbbing   

                

2016                              None                

 

                    otalgia                   Onset and Resolution              

     gradual in 

onset                     2016                   None                

 

                    otalgia                   Onset of Symptom                  

 1 weeks ago        

                          2016                   None                

 

                    otalgia                   Onset and Resolution              

     ongoing        

                          2016                   Has been using loratadine

 and flonase    

           

 

                otalgia                   Quality                   acute       

            

2016                              None                

 

                    otalgia                   Onset and Resolution              

     sudden in onset

                          2016                   None                

 

                    constipation                   Quality                   doc

ry 2-3 days         

                          2016                   None                

 

                    constipation                   Quality                   sma

ll stools           

                          2016                   None                

 

                constipation                   Quality                   hard   

                

2016                              None                

 

                    constipation                   Onset and Resolution         

          ongoing   

                          2016                   None                

 

                    constipation                   Alleviating Factors          

         medication.

                          2016                   Has tried various OTC chase

atments 

with very little releif                

 

                    constipation                   Alleviating Factors          

         diet 

changes                   2016                   None                

 

                    flank pain                   Location                   on b

oth sides           

                          2016                   None                

 

                    flank pain                   Quality                   stabb

ing                 

                          2016                   None                

 

                flank pain                   Quality                   sharp    

               

2016                              None                

 

                    flank pain                   Onset and Resolution           

        resolved    

                          2016                   Only had the one episode 

1 week ago  

             

 

                    back pain                   Location                   in th

e low back          

                          2016                   None                

 

                back pain                   Quality                   dull      

             

2016                              None                

 

                back pain                   Quality                   chronic   

                

2016                              None                

 

                    back pain                   Onset and Resolution            

       worse during 

the morning                   2016                   None                

 

                    hyperglycemia                   Quality                   gl

ucose intolerance   

                          2015                   None                

 

                    hyperglycemia                   Quality                   st

able                

                          2015                   None                

 

                    otalgia                   Location                   on both

 sides              

                          2015                   None                

 

                otalgia                   Quality                   dull        

           

2015                              None                

 

                    dizziness                   Quality                   interm

ittent              

                          2015                   None                

 

                    dizziness                   Quality                   lighth

eadedness           

                          2015                   None                

 

                    dizziness                   Quality                   imbala

nce                 

                          2015                   None                

 

                    hypertension                   Quality                   chr

onic                

                          2015                   None                

 

                    hypertension                   Quality                   sta

ble                 

                          2015                   None                

 

                otalgia                   Quality                   acute       

            

2015                              None                

 

                    otalgia                   Onset and Resolution              

     sudden in onset

                          2015                   None                

 

                    otalgia                   Onset and Resolution              

     ongoing        

                          2015                   None                

 

                    hypertension                   Onset and Resolution         

          ongoing   

                          2015                   None                

 

                    hypertension                   Onset of Symptom             

      during 

adulthood                   2015                   None                

 

                    hypertension                   Exacerbating Factors         

          stress    

                          2015                   None                

 

                    hypertension                   Alleviating Factors          

         medication 

                          2015                   None                

 

                    hyperglycemia                   Onset of Symptom            

       during 

adulthood                   2015                   None                

 

                otalgia                   Severity                   moderate   

                

2015                              None                

 

                    otalgia                   Triggers                   positio

n change            

                          2015                   None                

 

                    dizziness                   Onset and Resolution            

       ongoing      

                          2015                   None                

 

                    dizziness                   Quality                   rotati

on                  

                          2015                   None                

 

                    dizziness                   Quality                   loss o

f balance           

                          2015                   None                

 

                    dizziness                   Quality                   sense 

of room spinning    

                          2015                   None                

 

                dizziness                   Quality                   vertigo   

                

2015                              None                

 

                    dizziness                   Quality                   worsen

ing                 

                          2015                   None                

 

                    nasal allergies                   Location                  

 in both  nares     

                          2015                   None                

 

                    ataxia                   Quality                   feeling o

f unsteadiness with 

walking                   2015                   None                

 

                    ataxia                   Onset and Resolution               

    ongoing         

                          2015                   None                

 

                ataxia                   Quality                   worsening    

               

2015                              None                

 

                    ataxia                   Onset of Symptom                   

during adulthood    

                          2015                   None                

 

                    ataxia                   Limitation on Activities           

        necessitates

ambulation with a cane or walker                   2015                   

to make sure doesn't fall                

 

                    ataxia                   Frequency of Episodes              

     increasing     

                          2015                   None                

 

                ataxia                   Triggers                   activity    

               

2015                              has became more sedentary due to fear of

 falling   

            

 

                    muscle weakness                   Location                  

 diffusely          

                          2015                   None                

 

                    muscle weakness                   Quality                   

lower extremities   

                          2015                   None                

 

                    muscle weakness                   Quality                   

fatigue             

                          2015                   None                

 

                    muscle weakness                   Quality                   

worsening           

                          2015                   None                

 

                    muscle weakness                   Quality                   

clumsiness          

                          2015                   None                

 

                    muscle weakness                   Onset and Resolution      

             ongoing

                          2015                   None                

 

                fatigue                   Quality                   worsening   

                

2015                               passed away in July             

   

 

                    fatigue                   Quality                   low ener

gy                  

                          2015                   None                

 

                    muscle weakness                   Location                  

 diffusely          

                          2015                   None                

 

                    muscle weakness                   Quality                   

intermittent        

                          2015                   None                

 

                    muscle weakness                   Quality                   

fatigue             

                          2015                   None                

 

                    muscle weakness                   Onset and Resolution      

             ongoing

                          2015                   None                

 

                    otalgia                   Location                   on the 

right               

                          2015                   None                

 

                otalgia                   Quality                   acute       

            

2015                              None                

 

                otalgia                   Quality                   pressure    

               

2015                              None                

 

                    otalgia                   Quality                   uncomfor

table               

                          2015                   None                

 

                    otalgia                   Onset of Symptom                  

 2 weeks ago        

                          2015                   None                

 

                dizziness                   Quality                   acute     

              

2015                              None                

 

                    dizziness                   Quality                   feelin

gs of unsteadiness  

                          2015                   None                

 

                    dizziness                   Onset of Symptom                

   2 weeks ago      

                          2015                   None                

 

                    pain, limb                   Location                   on t

he right lower leg  

                          2015                   None                

 

                    pain, limb                   Quality                   inter

mittent             

                          2015                   None                

 

                    pain, limb                   Onset and Resolution           

        ongoing     

                          2015                   None                

 

                pain, limb                   Quality                   dull     

              

2015                              None                

 

                    pain, limb                   Onset of Symptom               

    2 weeks ago     

                          2015                   None                

 

                    pain, limb                   Quality                   worse

rhoda                

                          2015                   None                

 

                pain, limb                   Quality                   acute    

               

2015                              None                

 

                    pain, limb                   Pertinent Findings             

      Denies 

swelling                   2015                   None                

 

                    pain, limb                   Pertinent Findings             

      Denies warmth 

                          2015                   None                

 

                    pain, limb                   Pertinent Findings             

      muscle 

tenderness                   2015                   None                

 

                    pain, limb                   Pertinent Findings             

      Denies focal 

weakness                   2015                   None                

 

                    pain, limb                   Pertinent Findings             

      Denies 

erythema                   2015                   None                

 

                    chest pain/pressure                   Location              

     in the 

substernal area                   2015                   None             

  

 

                    chest pain/pressure                   Radiating             

      the back      

                          2015                   None                

 

                    chest pain/pressure                   Quality               

    improving       

                          2015                   None                

 

                    back pain                   Location                   [facility]

[facility]           

                          2015                   None                

 

                    back pain                   Quality                   improv

ing                 

                          2015                   None                

 

                    hyperglycemia                   Quality                   st

able                

                          2015                   Monitoring BS daily and r

riddhing 's         

      

 

                    hyperglycemia                   Quality                   pr

e-diabetic          

                          2015                   None                

 

                    hypertension                   Quality                   sta

ble                 

                          2015                   None                

 

                    hypertension                   Quality                   chr

onic                

                          2015                   None                

 

                    hyperlipidemia                   Quality                   s

table               

                          2015                   None                

 

                    sinusitis                   Location                   diffu

sely                

                          2014                   None                

 

                sinusitis                   Quality                   acute     

              

2014                              None                

 

                    sinusitis                   Quality                   fullne

ss                  

                          2014                   None                

 

                    sinusitis                   Onset of Symptom                

   2 days ago       

                          2014                   None                

 

                    cough                   Location                   in the th

roat                

                          2014                   None                

 

                cough                   Quality                   acute         

          

2014                              None                

 

                cough                   Quality                   dry           

        

2014                              None                

 

                cough                   Quality                   hacking       

            

2014                              None                

 

                    cough                   Onset of Symptom                   2

 weeks ago          

                          2014                   None                

 

                    otalgia                   Location                   on the 

right               

                          2014                   None                

 

                otalgia                   Quality                   acute       

            

2014                              None                

 

                otalgia                   Quality                   throbbing   

                

2014                              None                

 

                    otalgia                   Onset of Symptom                  

 1 week ago         

                          2014                   None                

 

                otalgia                   Quality                   pressure    

               

2014                              None                

 

                dizziness                   Quality                   acute     

              

2014                              None                

 

                    dizziness                   Quality                   feelin

gs of unsteadiness  

                          2014                   None                

 

                    dizziness                   Quality                   loss o

f balance           

                          2014                   None                

 

                    dizziness                   Onset of Symptom                

   1 week ago       

                          2014                   None                

 

                    muscle weakness                   Quality                   

lower extremities   

                          2014                   None                

 

                    muscle weakness                   Onset and Resolution      

             ongoing

                          2014                   Patient feels like it is 

related 

to amlodipine 2.5mg daily                

 

                    dizziness                   Quality                   sense 

of motion           

                          2014                   None                

 

                    dizziness                   Quality                   interm

ittent              

                          2014                   Inner ear issues---using 

flonase BID           

    

 

                cough                   Quality                   productive    

               

2014                              None                

 

                    back pain                   Location                   [facility]

[facility]           

                          2014                   None                

 

                back pain                   Quality                   aching    

               

2014                              None                

 

                sinusitis                   Quality                   pain      

             

2014                              None                

 

                    sinusitis                   Quality                   purule

nt                  

                          2014                   None                

 

                    sinusitis                   Quality                   fullne

ss                  

                          2014                   None                

 

                sinusitis                   Quality                   thick     

              

2014                              None                

 

                    sinusitis                   Quality                   pressu

re                  

                          2014                   None                

 

                    sinusitis                   Quality                   draina

ge                  

                          2014                   None                

 

                    sinusitis                   Onset and Resolution            

       ongoing      

                          2014                   Has been taking fluticaso

ne/claritin 

and also took leftover cefuroxime                

 

                    sinusitis                   Onset of Symptom                

   2 weeks ago      

                          2014                   None                

 

                    muscle weakness                   Quality                   

lower extremities   

                          2014                   None                

 

                    hypertension                   Onset and Resolution         

          ongoing   

                          2014                   None                

 

                    hypertension                   Onset of Symptom             

      during 

adulthood                   2014                   None                

 

                    hypertension                   Quality                   imp

roving              

                          2014                   None                

 

                    muscle weakness                   Location                  

 diffusely          

                          2014                   None                

 

                    muscle weakness                   Onset and Resolution      

             ongoing

                          2014                   None                

 

                    headache                   Location                   diffus

ely                 

                          2014                   None                

 

                headache                   Quality                   aching     

              

2014                              None                

 

                    headache                   Onset of Symptom                 

  3 days ago        

                          2014                   None                

 

                    cough                   Location                   in the th

roat                

                          2014                   None                

 

                cough                   Quality                   acute         

          

2014                              None                

 

                cough                   Quality                   productive    

               

2014                              Yellow sputum                

 

                    chest congestion                   Quality                  

 acute              

                          2014                   None                

 

                    chest congestion                   Quality                  

 painful            

                          2014                   None                

 

                    chest congestion                   Onset of Symptom         

          2 days ago

                          2014                   None                

 

                    sore throat                   Location                   dif

fusely              

                          2014                   None                

 

                    sore throat                   Quality                   achi

ng                  

                          2014                   None                

 

                    sore throat                   Onset of Symptom              

     2 days ago     

                          2014                   None                

 

                    cough                   Location                   in the th

roat                

                          10/15/2013                   None                

 

                cough                   Quality                   productive    

               

10/15/2013                              None                

 

                    chest congestion                   Quality                  

 thick/yellow 

secretions                   10/15/2013                   None                

 

                    chest congestion                   Onset of Symptom         

          2 days ago

                          10/15/2013                   Has been taking flonase a

nd 

claritin 1/2 tab daily                

 

                    myalgias                   Location                   diffus

ely                 

                          10/15/2013                   None                

 

                    hypertension                   Onset of Symptom             

      3 days ago    

                          2013                   None                

 

                    hypertension                   Quality                   acu

te                  

                          2013                   None                

 

                    hypertension                   Blood Pressure Values        

           Stage 

2:-179 mmHg / -109 mmHg                   2013              

                                        None                

 

                    headache                   Location                   in the

 occipital area     

                          2013                   None                

 

                headache                   Quality                   aching     

              

2013                              None                

 

                    headache                   Onset of Symptom                 

  3 days ago        

                          2013                   None                

 

                    lower leg pain                   Location                   

on the right        

                          2013                   None                

 

                    headache                   Onset and Resolution             

      resolved      

                          2013                   after got shot in ER     

           

 

                anxiety                   Quality                   chronic     

              

2013                              None                

 

                    anxiety                   Onset and Resolution              

     ongoing        

                          2013                   None                

 

                    dizziness                   Quality                   sense 

of room spinning    

                          2013                   None                

 

                    otalgia                   Location                   on the 

right               

                          2013                   None                

 

                    sinus pain                   Quality                   press

ure                 

                          2013                   None                

 

                    dizziness                   Onset of Symptom                

   1 weeks ago      

                          2013                   None                

 

                    headache                   Location                   diffus

ely                 

                          2013                   None                

 

                headache                   Quality                   acute      

             

2013                              None                

 

                    sinus pain                   Location                   diff

usely               

                          2013                   None                

 

                sinus pain                   Quality                   acute    

               

2013                              None                

 

                    follow up                   Illness                   sympto

ms improved         

                          2013                   None                

 

                    otalgia                   Location                   on both

 sides              

                          2013                   None                

 

                otalgia                   Quality                   stable      

             

2013                              None                

 

                    otalgia                   Onset and Resolution              

     resolved       

                          2013                   wants rechecked to see if

 does have hole

in eardrum                

 

                    otalgia                   Onset of Symptom                  

 4 days ago         

                          05/10/2013                   None                

 

                    sore throat                   Location                   on 

the right           

                          05/10/2013                   None                

 

                cough                   Quality                   dry           

        

05/10/2013                              None                

 

                    otalgia                   Location                   on the 

right               

                          05/10/2013                   hurts into throat, has tr

ied ear drops, pain 7-8

on 10 scale                

 

                    breast complaint                   Location                 

  in the left upper 

inner quadrant                   2013                   None              

 

 

                    breast complaint                   Quality                  

 pain               

                          2013                   None                

 

                    breast complaint                   Onset of Symptom         

          1 days ago

                          2013                   Hasn't had mammogram in m

any 

years                

 

                    abdominal pain                   Location                   

in the epigastric 

area                      2012                   None                

 

                    dyspepsia                   Quality                   heartb

urn                 

                          2012                   None                

 

                    dyspepsia                   Quality                   improv

ing                 

                          2012                   None                

 

                    dizziness                   Quality                   sense 

of room spinning    

                          2012                   None                

 

                    dizziness                   Onset of Symptom                

   4 days ago       

                          2012                   None                

 

                    otalgia                   Location                   on both

 sides              

                          2012                   None                

 

                    knee pain                   Location                   on [place]

th knees            

                          2012                   None                

 

                    abdominal pain                   Quality                   i

mproving            

                          2012                   None                

 

                    abdominal pain                   Quality                   i

mproving            

                          10/23/2012                   None                

 

                    gas and bloating                   Quality                  

 improving          

                          10/23/2012                   None                

 

                    hypertension                   Quality                   sta

ble                 

                          10/23/2012                   None                

 

                    abdominal pain                   Location                   

in the epigastric 

area                      10/10/2012                   None                

 

                    abdominal pain                   Onset and Resolution       

            ongoing 

                          10/10/2012                   Has been taking omeprazol

e 20mg 

BID with no relief                

 

                    gas and bloating                   Location                 

  diffusely         

                          10/10/2012                   None                

 

                    gas and bloating                   Quality                  

 intermittent       

                          10/10/2012                   None                

 

                    diarrhea                   Quality                   intermi

ttent               

                          10/10/2012                   None                

 

                    nausea                   Quality                   intermitt

ent                 

                          10/10/2012                   None                

 

                    hypertension                   Quality                   sta

ble                 

                          10/10/2012                   None                

 

                    dizziness                   Quality                   lighth

eadedness/faint     

                          2012                   None                

 

                    dizziness                   Quality                   shakin

ess                 

                          2012                   None                

 

                dizziness                   Quality                   acute     

              

2012                              has had these episodes off and on past f

ew months  

             

 

                    dizziness                   Quality                   consta

nt                  

                          2012                   None                

 

                    dizziness                   Quality                   lighth

eadedness           

                          2012                   None                

 

                    dizziness                   Quality                   feelin

gs of unsteadiness  

                          2012                   None                

 

                    dizziness                   Onset and Resolution            

       sudden in 

onset                     2012                   None                

 

                    dizziness                   Onset of Symptom                

   1 days ago       

                          2012                   None                

 

                    dysuria                   Quality                   intermit

tent                

                          2012                   None                

 

                dysuria                   Quality                   aching      

             

2012                              None                

 

                    dysuria                   Onset and Resolution              

     sudden in onset

                          2012                   None                

 

                    dysuria                   Onset of Symptom                  

 2 days ago         

                          2012                   None                

 

                    muscle weakness                   Location                  

 diffusely          

                          2012                   None                

 

                    dizziness                   Quality                   lighth

eadedness/shakiness 

                          2012                   None                

 

                headache                   Quality                   dull       

            

2012                              None                

 

                    headache                   Location                   on the

 back side of head  

                          2012                   None                

 

                    headache                   Frequency of Episodes            

       daily        

                          2012                   None                

 

                nausea                   Quality                   acute        

           

2012                              had episode Friday while was with david proctor and he 

was having bloodwork and IV fluids done                

 

                dizziness                   Quality                   acute     

              

2012                              episode recently when was with boyfriend

 at 

hospital                

 

                    leg pain/sciatica                   Location                

   right leg 

sciatica                   2012                   None                

 

                    leg pain/sciatica                   Quality                 

  sharp pain        

                          2012                   None                

 

                    leg pain/sciatica                   Quality                 

  constant          

                          2012                   None                

 

                    leg pain/sciatica                   Onset and Resolution    

               

ongoing                   2012                   None                

 

                    leg pain/sciatica                   Onset of Symptom        

           2 weeks 

ago                       2012                   None                

 

                    leg pain/sciatica                   Limitation on Activities

                   

restricts weight bearing activity                   2012                  

                                        None                

 

                    leg pain/sciatica                   Limitation on Activities

                   

moderately limits activities                   2012                   None

               

 

                    hip pain                   Location                   on the

 right              

                          2012                   None                

 

                    hip pain                   Quality                   sharp p

ain                 

                          2012                   None                

 

                    hip pain                   Quality                   radiati

ng down leg         

                          2012                   None                

 

                    hip pain                   Onset and Resolution             

      sudden in 

onset                     2012                   None                

 

                    hip pain                   Onset of Symptom                 

  3 days ago        

                          2012                   None                

 

                    back pain                   Location                   in th

e upper back area   

                          2012                   None                

 

                back pain                   Quality                   sharp     

              

2012                              None                

 

                    back pain                   Quality                   pinchi

ng                  

                          2012                   None                

 

                    back pain                   Onset of Symptom                

   1 weeks ago      

                          2012                   None                

 

                    back pain                   Location                   lumba

r-sacral spine      

                          2012                   None                

 

                back pain                   Quality                   aching    

               

2012                              None                

 

                back pain                   Quality                   sharp     

              

2012                              None                

 

                    back pain                   Quality                   radiat

ing down right leg  

                          2012                   None                

 

                    back pain                   Onset of Symptom                

   1 weeks ago      

                          2012                   None                

 

                    pain, limb                   Location                   on t

he right leg        

                          2012                   None                

 

                    muscle weakness                   Quality                   

fatigue             

                          2012                   None                

 

                    muscle weakness                   Quality                   

shakey              

                          2012                   None                

 

                    muscle weakness                   Onset of Symptom          

         2 weeks ago

                          2012                   None                

 

                    arthralgia(s)                   Quality                   cr

amping              

                          2012                   None                

 

                    fatigue                   Onset and Resolution              

     ongoing        

                          2012                   None                

 

                fatigue                   Quality                   worsening   

                

2012                              None                

 

                    fatigue                   Quality                   intermit

tent                

                          2012                   None                

 

                    low blood pressure                   Onset and Resolution   

                

ongoing                   2012                   None                

 

                    arthralgia(s)                   Quality                   in

termittent          

                          2012                   None                

 

                    otalgia                   Location                   on both

 sides              

                          2011                   None                

 

                otalgia                   Quality                   constant    

               

2011                              None                

 

                otalgia                   Quality                   throbbing   

                

2011                              None                

 

                    otalgia                   Onset and Resolution              

     sudden in onset

                          2011                   None                

 

                    otalgia                   Onset of Symptom                  

 _ weeks ago        

                          2011                   None                

 

                otalgia                   Severity                   moderate   

                

2011                              None                

 

                    otalgia                   Frequency of Episodes             

      increasing    

                          2011                   None                

 

                    sore throat                   Location                   on 

both sides          

                          2011                   None                

 

                    sore throat                   Quality                   cons

tant                

                          2011                   None                

 

                    sore throat                   Quality                   achi

ng                  

                          2011                   None                

 

                    sore throat                   Quality                   scra

tchy                

                          2011                   None                

 

                    sore throat                   Onset and Resolution          

         sudden in 

onset                     2011                   None                

 

                    sore throat                   Onset of Symptom              

     _ weeks ago    

                          2011                   None                

 

                    headache                   Location                   in the

 occipital area     

                          2011                   None                

 

                headache                   Quality                   aching     

              

2011                              None                

 

                headache                   Quality                   constant   

                

2011                              None                

 

                    headache                   Quality                   throbbi

ng                  

                          2011                   None                

 

                    headache                   Onset and Resolution             

      sudden in 

onset                     2011                   None                

 

                    headache                   Onset of Symptom                 

  1 weeks ago       

                          2011                   None                

 

                    headache                   Limitation on Activities         

          moderately

limits activities                   2011                   None           

    

 

                    headache                   Location                   in the

 frontal area       

                          2011                   None                

 

                    back pain                   Location                   thora

cic spine           

                          2011                   None                

 

                    back pain                   Quality                   improv

ing with vimovo     

                          2011                   None                

 

                    abdominal pain                   Quality                   i

mproving            

                          2011                   None                

 

                    otalgia                   Location                   on the 

right               

                          2011                   None                

 

                    dizziness                   Quality                   sense 

of room spinning    

                          2011                   None                

 

                dizziness                   Quality                   vertigo   

                

2011                              None                

 

                    dizziness                   Quality                   interm

ittent              

                          2011                   None                

 

                    back pain                   Location                   thora

cic spine           

                          2011                   None                

 

                back pain                   Quality                   aching    

               

2011                              None                

 

                    otitis media                   Quality                   acu

te                  

                          2011                   None                

 

                dizziness                   Quality                   acute     

              

2011                              None                

 

                    abdominal pain                   Quality                   i

ntermittent         

                          2011                   None                

 

                    hip pain                   Location                   on the

 right              

                          2011                   None                

 

                    hip pain                   Quality                   improve

d very little from 

last week                   2011                   None                

 

                    hip pain                   Quality                   worsene

d with twisting     

                          2011                   None                

 

                    follow up                   Illness                   rocky

ms improved         

                          2011                   but still having problems

                

 

                hip pain                   Quality                   constant   

                

2011                              None                

 

                    hip pain                   Onset of Symptom                 

  8 hours ago       

                          2011                   noticed when woke up this

 morning       

        

 

                    hip pain                   Quality                   sharp p

ain                 

                          2011                   None                

 

                    hip pain                   Location                   on the

 right              

                          2011                   did walk on treadmill yes

terday with no shoes  

             

 

                    hip pain                   Location                   in the

 buttocks           

                          2011                   None                

 

                    hip pain                   Location                   in the

 groin              

                          2011                   None                

 

                    low blood pressure                   Quality                

   worsening        

                          2011                   None                

 

                    depression                   Onset and Resolution           

        ongoing     

                          2011                   thinks needs lexapro dose

 increased   

            

 

                fatigue                   Quality                   improving   

                

2010                              None                

 

                    muscle weakness                   Quality                   

improving           

                          2010                   None                

 

                    follow up                   Illness                   rocky

ms improved         

                          2010                   with Lexapro             

   

 

                    urinary urgency                   Onset and Resolution      

             ongoing

                          2010                   but improving with increa

sed 

water intake                

 

                    follow up                   Illness                   rocky

ms improved         

                          2010                   had weakness and shakes--

was busy week 

getting ready for granddaughter's wedding                

 

                    weakness                   Quality                   muscle 

weakness            

                          2010                   None                

 

                    dyskinesia or tremor                   Quality              

     acute          

                          2010                   came on out of blue      

          

 

                    follow up                   Illness                   rocky

ms remained 

unchanged                   2010                   still with fatigue, but

did have few days of increased energy after B12 shot                

 

                    constipation                   Quality                   imp

roving              

                          2010                   with stool softeners     

           

 

                    fatigue                   Onset and Resolution              

     ongoing        

                          2010                   None                

 

                    gastroesophageal reflux                   Quality           

        chronic     

                          2010                   with hiatal hernia and po

ssible early 

Shields's                

 

                    constipation                   Quality                   imp

roving              

                          2010                   with stool softener and p

robiotic--area in 

colon where couldn't get scope through                

 

                melena                   Quality                   black        

           

2010                              at times prior to scopes                

 

                fatigue                   Quality                   chronic     

              

2010                              on iron but not B12                

 

                    weakness                   Quality                   general

ized fatigue        

                          2010                   None                

 

                    weakness                   Quality                   decreas

ed energy           

                          2010                   None                

 

                    follow up                   Reason                   [facility]

s check             

                          2010                   Having trouble with gener

alized "shakiness" 

and overwhelming fatigue                

 

                    fatigue                   Onset of Symptom                  

 1 months ago       

                          2010                   None                

 

                fatigue                   Quality                   worsening   

                

2010                              None                

 

                    fatigue                   Limitation on Activities          

         moderately 

limits activities                   2010                   Able to 

maintain household, but has several days in a row where fatigue is overwhelming.
               

 

                    weakness                   Onset of Symptom                 

  1 months ago      

                          2010                   None                

 

                    weakness                   Frequency of Episodes            

       increasing   

                          2010                   None                

 

                    weakness                   Length of Episodes               

    2-3 days        

                          2010                   None                

 

                    weakness                   Limitation on Activities         

          moderately

limits activities                   2010                   None           

    

 

                    weakness                   Alleviating Factors              

     rest           

                          2010                   None                

 

                    weakness                   Quality                   muscle 

weakness            

                          2010                   Bilat leg/knee weakness a

nd feels 

shakey--xanax does help                

 

                fatigue                   Quality                   chronic     

              

2010                              None                

 

                    weakness                   Onset and Resolution             

      ongoing       

                          2010                   started after did 2days o

f extenive 

house cleaning                

 

                    disturbances of emotion                   Quality           

        chronic     

                          2010                   gets upset easily        

        



                                                                              



Advance Directives

          No Advance Directive data                                             
                      



Encounters

                      



                    Encounter                   Performer                   Loca

tion                

                          Codes                     Date                

 

                                                            ([id number]) OFFICE/OUTPA

TIENT VISIT EST

Diagnosis: Acute serous otitis media, left ear[ICD10: H65.02]                   
                          Nat Lozoya                   AKANKSHA BAKER [facility]

[employer]

                          CPT-4: [id number]                   2019             

   

 

                                                            ([id number]) OFFICE/OUTPA

TIENT VISIT EST

Diagnosis: Essential (primary) hypertension[ICD10: I10]

Diagnosis: Coronary atherosclerosis due to calcified coronary lesion[ICD10: 
I25.84]

Diagnosis: Hypoglycemia, unspecified[ICD10: E16.2]

Diagnosis: Other fatigue[ICD10: R53.83]

Diagnosis: Urinary tract infection, site not specified[ICD10: N39.0]            
                          Akanksha BAKER 

[employer]                   CPT-4: 86062                   06/10/2019      

         

 

                                                            ([id number]) OFFICE/OUTPA

TIENT VISIT EST

Diagnosis: Essential (primary) hypertension[ICD10: I10]

Diagnosis: Gastro-esophageal reflux disease without esophagitis[ICD10: K21.9]

Diagnosis: Urinary tract infection, site not specified[ICD10: N39.0]            
                          [facility] [employer]                   CPT-4: 20514                   2019      

         

 

                                                            ([id number]) OFFICE/OUTPA

TIENT VISIT EST

Diagnosis: Gastro-esophageal reflux disease without esophagitis[ICD10: K21.9]

Diagnosis: Epigastric pain[ICD10: R10.13]                                     

Akanksha DRIVER [facility]            

   

                          CPT-4: 91586                   2018             

   

 

                                                            (25806) OFFICE/OUTPA

TIENT VISIT EST

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

Diagnosis: Essential (primary) hypertension[ICD10: I10]

Diagnosis: Generalized anxiety disorder[ICD10: F41.1]

Diagnosis: Gastro-esophageal reflux disease without esophagitis[ICD10: K21.9]   
                                 Akanksha DRIVER [facility]                   CPT-4: 34393                   2018   

            

 

                                                            OFFICE/OUTPATIENT VI

SIT EST

Diagnosis: Gastro-esophageal reflux disease without esophagitis[ICD10: K21.9]

Diagnosis: Palpitations[ICD10: R00.2]

Diagnosis: Urinary tract infection, site not specified[ICD10: N39.0]

Diagnosis: Generalized anxiety disorder[ICD10: F41.1]                           
                          Akanksha MEJIA [facility]      

                          CPT-4: 61504                   10/02/2018             

   

 

                                                            ([id number]) NURSE/OUTPAT

IENT VISIT EST

Diagnosis: Coronary atherosclerosis due to calcified coronary lesion[ICD10: 
I25.84]

Diagnosis: Hypoglycemia, unspecified[ICD10: E16.2]

Diagnosis: Dizziness and giddiness[ICD10: R42]

Diagnosis: Occlusion and stenosis of bilateral carotid arteries[ICD10: I65.23]  
                                  Akanksha DRIVER [facility]                   CPT-4: 36031                   

2018                

 

                                                            OFFICE/OUTPATIENT VI

SIT EST

Diagnosis: Epigastric pain[ICD10: R10.13]

Diagnosis: Generalized anxiety disorder[ICD10: F41.1]

Diagnosis: FLU VACCINE[ICD10: Z23]                                     

Akanksha SPENCER [facility]            

   

                          CPT-4: 58662                   2018             

   

 

                                                            ([id number]) OFFICE/OUTPA

TIENT VISIT EST

Diagnosis: Essential (primary) hypertension[ICD10: I10]

Diagnosis: Hypoglycemia, unspecified[ICD10: E16.2]

Diagnosis: Gastro-esophageal reflux disease without esophagitis[ICD10: K21.9]   
                                 Akanksha DRIVER [facility]                   CPT-4: 95781                   2018   

            

 

                                                            (54443) OFFICE/OUTPA

TIENT VISIT EST

Diagnosis: Dizziness and giddiness[ICD10: R42]                                  
                          Nat SPENCER

[facility]               

                          CPT-4: 54582                   2018             

   

 

                                                            (28569) OFFICE/OUTPA

TIENT VISIT EST

Diagnosis: Cervicalgia[ICD10: M54.2]

Diagnosis: Other spondylosis with radiculopathy, cervical region[ICD10: M47.22] 
                                   Akanksha DRIVER [facility]                   CPT-4: 90050                   

2018                

 

                                                            (17871) OFFICE/OUTPA

TIENT VISIT EST

Diagnosis: Hypoglycemia, unspecified[ICD10: E16.2]

Diagnosis: Other spondylosis with radiculopathy, cervical region[ICD10: M47.22]

Diagnosis: Vertigo of central origin, bilateral[ICD10: H81.43]

Diagnosis: Cervicocranial syndrome[ICD10: M53.0]                                
                          Akanksha ROTHMANR [facility]           

                          CPT-4: 00089                   2018             

   

 

                                                            (10054) OFFICE/OUTPA

TIENT VISIT EST

Diagnosis: Benign paroxysmal vertigo, bilateral[ICD10: H81.13]

Diagnosis: Otalgia, bilateral[ICD10: H92.03]                                    
                          Nat SPENCER

[facility]                 

                          CPT-4: 59038                   2018             

   

 

                                                            (28422) OFFICE/OUTPA

TIENT VISIT EST

Diagnosis: Low back pain[ICD10: M54.5]

Diagnosis: Radiculopathy, lumbosacral region[ICD10: M54.17]

Diagnosis: Left lower quadrant pain[ICD10: R10.32]                              
                          Akanksha ROTHMANR [facility]         

                          CPT-4: 28254                   2018             

   

 

                                                            (41501) OFFICE/OUTPA

TIENT VISIT EST

Diagnosis: FLU VACCINE[ICD10: Z23]                                     

Akanksha DRIVER [facility]            

   

                          CPT-4: 95125                   10/06/2017             

   

 

                                                            (27553) OFFICE/OUTPA

TIENT VISIT EST

Diagnosis: Mixed hyperlipidemia[ICD10: E78.2]

Diagnosis: Essential (primary) hypertension[ICD10: I10]

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

Diagnosis: Impaired fasting glucose[ICD10: R73.01]

Diagnosis: Other fatigue[ICD10: R53.83]                                     

Akanksha DRIVER [facility]            

   

                          CPT-4: 78201                   2017             

   

 

                                                            (74682) OFFICE/OUTPA

TIENT VISIT EST

Diagnosis: Pain in thoracic spine[ICD10: M54.6]

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 
                                   Akanksha DRIVER [facility]                   CPT-4: 36211                   

2017                

 

                                                            (25737) OFFICE/OUTPA

TIENT VISIT EST

Diagnosis: Left lower quadrant pain[ICD10: R10.32]

Diagnosis: Low back pain[ICD10: M54.5]                                     

Akanksha DRIVER [facility]            

   

                          CPT-4: 10679                   2017             

   

 

                                                            (15815) OFFICE/OUTPA

TIENT VISIT EST

Diagnosis: Mixed hyperlipidemia[ICD10: E78.2]

Diagnosis: Essential (primary) hypertension[ICD10: I10]

Diagnosis: Hypoglycemia, unspecified[ICD10: E16.2]                              
                          Akanksha MEJIA [facility]         

                          CPT-4: 66419                   2017             

   

 

                                                            (02397) OFFICE/OUTPA

TIENT VISIT EST

Diagnosis: Impaired fasting glucose[ICD10: R73.01]

Diagnosis: Mastodynia[ICD10: N64.4]

Diagnosis: Mixed hyperlipidemia[ICD10: E78.2]

Diagnosis: Essential (primary) hypertension[ICD10: I10]

Diagnosis: Other fatigue[ICD10: R53.83]                                     

Akanksha DRIVER [facility]            

   

                          CPT-4: 87368                   2017             

   

 

                                                            (07146) OFFICE/OUTPA

TIENT VISIT EST

Diagnosis: Acute upper respiratory infection, unspecified[ICD10: J06.9]         
                           Luba Stevenson                   AKANKSHA DRIVER [facility]                   CPT-4: 58970                   2017      

         

 

                                                            (63862) OFFICE/OUTPA

TIENT VISIT EST

Diagnosis: Acute mastoiditis without complications, right ear[ICD10: H70.001]   
                                 Akanksha DRIVER [facility]                   CPT-4: 08834                   2016   

            

 

                                                            (68772) OFFICE/OUTPA

TIENT VISIT EST

Diagnosis: FLU VACCINE[ICD10: Z23]                                     

Akanksha DRIVER [facility]            

   

                          CPT-4: 22776                   10/07/2016             

   

 

                                                            (59335) OFFICE/OUTPA

TIENT VISIT EST

Diagnosis: Mixed hyperlipidemia[ICD10: E78.2]

Diagnosis: Essential (primary) hypertension[ICD10: I10]

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

Diagnosis: Impaired fasting glucose[ICD10: R73.01]                              
                          Akanksha MEJIA [facility]         

                          CPT-4: 35037                   2016             

   

 

                                                            (80385) OFFICE/OUTPA

TIENT VISIT EST

Diagnosis: Constipation, unspecified[ICD10: K59.00]                             
                          Akanksha MEJIA [facility]        

                          CPT-4: 29209                   2016             

   

 

                                                            (64095) OFFICE/OUTPA

TIENT VISIT EST

Diagnosis: Essential (primary) hypertension[ICD10: I10]

Diagnosis: Mixed hyperlipidemia[ICD10: E78.2]

Diagnosis: Chronic kidney disease, stage 1[ICD10: N18.1]                        
                          Akanksha MEJIA [facility]   

                          CPT-4: 44150                   2016             

   

 

                                                            (19589) OFFICE/OUTPA

TIENT VISIT EST

Diagnosis: Muscle weakness (generalized)[ICD10: M62.81]

Diagnosis: Dizziness and giddiness[ICD10: R42]

Diagnosis: Essential (primary) hypertension[ICD10: I10]

Diagnosis: History of falling[ICD10: Z91.81]                                    
                          Akanksha MEJIA [facility]               

                          CPT-4: 19059                   2015             

   

 

                                                            (60075) OFFICE/OUTPA

TIENT VISIT EST

Diagnosis: FLU VACCINE[ICD10: Z23]                                     

Akanksha DRIVER [facility]            

   

                          CPT-4: 62960                   10/16/2015             

   

 

                                                            (12941) OFFICE/OUTPA

TIENT VISIT EST

Diagnosis: Acute renal failure[ICD9: 584.9]

Diagnosis: POLYURIA[ICD9: 788.42]                                     Akanksha DRIVER [facility]                   CPT-4: 

38323                                   09/15/2015                

 

                                                            (43186) OFFICE/OUTPA

TIENT VISIT EST

Diagnosis: HYPERLIPIDEMIA NEC/NOS[ICD9: 272.4]

Diagnosis: HYPERTENSION[ICD9: 401.9]

Diagnosis: CAD[ICD9: 414.00]

Diagnosis: Hyperglycemia[ICD9: 790.29]

Diagnosis: MALAISE AND FATIGUE[ICD9: 780.79]                                    
                          Akanksha MEJIA [facility]               

                          CPT-4: 60749                   09/10/2015             

   

 

                                                            (51443) OFFICE/OUTPA

TIENT VISIT EST

Diagnosis: MALAISE AND FATIGUE[ICD9: 780.79]

Diagnosis: HYPOGLYCEMIA[ICD9: 251.2]

Diagnosis: Grieving[ICD9: 309.0]

Diagnosis: ABDOMINAL PAIN[ICD9: 789.00]                                     

Akanksha DRIVER [facility]            

   

                          CPT-4: 78211                   2015             

   

 

                                                            OFFICE/OUTPATIENT VI

SIT EST

Diagnosis: CERUMEN IMPACTION[ICD9: 380.4]

Diagnosis: EUSTACHIAN TUBE DYSFUNCTION[ICD9: 381.81]                            
                          Bertha KEVINQUELINE S. INOCENCIO SYKES [facility]     

                          CPT-4: 00278                   2015             

   

 

                                                            (20432) OFFICE/OUTPA

TIENT VISIT EST

Diagnosis: Leg pain[ICD9: 729.5]

Diagnosis: SUPERFIC PHLEBITIS-LEG[ICD9: 451.0]                                  
                          Akanksha MEJIA [facility]             

                          CPT-4: 89669                   2015             

   

 

                                                            (15912) OFFICE/OUTPA

TIENT VISIT EST

Diagnosis: Thoracic back pain[ICD9: 724.1]

Diagnosis: SPASM OF MUSCLE[ICD9: 728.85]                                     

Akanksha DRIVER [facility]            

   

                          CPT-4: 10357                   2015             

   

 

                                                            (95526) OFFICE/OUTPA

TIENT VISIT EST

Diagnosis: DIZZINESS/VERTIGO[ICD9: 780.4]

Diagnosis: Benign positional vertigo[ICD9: 386.11]

Diagnosis: HYPERTENSION[ICD9: 401.9]

Diagnosis: Suspicious nevus[ICD9: 238.2]                                     

Akanksha DRIVER [facility]            

   

                          CPT-4: 40848                   2015             

   

 

                                                            (99933) OFFICE/OUTPA

TIENT VISIT EST

Diagnosis: FLU VACCINE[ICD10: Z23]                                     

Akanksha DRIVER [facility]            

   

                          CPT-4: 13252                   10/17/2014             

   

 

                                                            OFFICE/OUTPATIENT VI

SIT EST

Diagnosis: SINUSITIS, ACUTE[ICD9: 461.9]

Diagnosis: EUSTACHIAN TUBE DYSFUNCTION[ICD9: 381.81]                            
                          Bertha PAINTER

[facility]     

                          CPT-4: 33199                   2014             

   

 

                                                            (58428) OFFICE/OUTPA

TIENT VISIT EST

Diagnosis: DIZZINESS/VERTIGO[ICD9: 780.4]

Diagnosis: HYPERTENSION[ICD9: 401.9]                                     

Akanksha DRIVER [facility]            

   

                          CPT-4: 51190                   2014             

   

 

                                                            (80736) OFFICE/OUTPA

TIENT VISIT EST

Diagnosis: HYPERTENSION[ICD9: 401.9]

Diagnosis: MALAISE AND FATIGUE[ICD9: 780.79]

Diagnosis: SINUSITIS, ACUTE[ICD9: 461.9]                                     

Akanksha DRIVER [facility]            

   

                          CPT-4: 95607                   2014             

   

 

                                                            (06450) OFFICE/OUTPA

TIENT VISIT EST

Diagnosis: HYPERLIPIDEMIA NEC/NOS[ICD9: 272.4]

Diagnosis: HYPERTENSION[ICD9: 401.9]

Diagnosis: B12 DEFIC ANEMIA NEC[ICD9: 281.1]

Diagnosis: HYPOGLYCEMIA[ICD9: 251.2]                                     

Akanksha DRIVER [facility]            

   

                          CPT-4: 82144                   2014             

   

 

                                                            OFFICE/OUTPATIENT VI

SIT EST

Diagnosis: COUGH[ICD9: 786.2]                                     Bertha DRIVER [facility]                   

CPT-4: 63794                            2014                

 

                                                            OFFICE/OUTPATIENT VI

SIT EST

Diagnosis: COUGH[ICD9: 786.2]

Diagnosis: URI, ACUTE[ICD9: 465.9]                                     Bertha 

Elieser DRIVER [facility]                   

CPT-4: 21241                            10/15/2013                

 

                                                            (95862) OFFICE/OUTPA

TIENT VISIT EST

Diagnosis: HYPERTENSION[ICD9: 401.9]

Diagnosis: CEPHALGIA[ICD9: 784.0]

Diagnosis: ANXIETY STATE NOS[ICD9: 300.00]

Diagnosis: FLU VACCINE[ICD9: V04.81]                                     

Akanksha DRIVER [facility]            

   

                          CPT-4: 43007                   2013             

   

 

                                                            (05645) OFFICE/OUTPA

TIENT VISIT EST

Diagnosis: DIZZINESS/VERTIGO[ICD9: 780.4]

Diagnosis: SINUSITIS, ACUTE[ICD9: 461.9]

Diagnosis: Benign positional vertigo[ICD9: 386.11]                              
                          Akanksha MEJIA [facility]         

                          CPT-4: 98995                   2013             

   

 

                                                            OFFICE/OUTPATIENT VI

SIT EST

Diagnosis: OTITIS MEDIA NOS[ICD9: 382.9]                                     

Akanksha DRIVER [facility]            

   

                          CPT-4: 41382                   2013             

   

 

                                                            OFFICE/OUTPATIENT VI

SIT EST

Diagnosis: Perforation of ear drum[ICD9: 384.20]

Diagnosis: SINUSITIS, ACUTE[ICD9: 461.9]                                     

Akanksha DRIVER [facility]            

   

                          CPT-4: 94239                   05/10/2013             

   

 

                                                            (27852) OFFICE/OUTPA

TIENT VISIT EST

Diagnosis: Mastalgia[ICD9: 611.71]                                     

Akanksha DRIVER [facility]            

   

                          CPT-4: 19438                   2013             

   

 

                                                            (17617) OFFICE/OUTPA

TIENT VISIT EST

Diagnosis: HYPERLIPIDEMIA NEC/NOS[ICD9: 272.4]

Diagnosis: HYPERTENSION[ICD9: 401.9]

Diagnosis: DIZZINESS/VERTIGO[ICD9: 780.4]

Diagnosis: Hyperglycemia[ICD9: 790.29]

Diagnosis: MALAISE AND FATIGUE[ICD9: 780.79]                                    
                          Akanksha MEJIA [facility]               

                          CPT-4: 14849                   2012             

   

 

                                                            (34501) OFFICE/OUTPA

TIENT VISIT EST

Diagnosis: EUSTACHIAN TUBE DYSFUNCTION[ICD9: 381.81]

Diagnosis: DIZZINESS/VERTIGO[ICD9: 780.4]

Diagnosis: ABDOMINAL PAIN[ICD9: 789.00]

Diagnosis: GERD[ICD9: 530.81]

Diagnosis: CERUMEN IMPACTION[ICD9: 380.4]                                     

Akanksha DRIVER [facility]            

   

                          CPT-4: 99278                   2012             

   

 

                                                            OFFICE/OUTPATIENT VI

SIT EST

Diagnosis: ABDOMINAL PAIN[ICD9: 789.00]

Diagnosis: DYSPEPSIA[ICD9: 536.8]                                     Akanksha DRIVER [facility]                   CPT-4: 

26742                                   10/23/2012                

 

                                                            (31889) OFFICE/OUTPA

TIENT VISIT EST

Diagnosis: ABDOMINAL PAIN[ICD9: 789.00]

Diagnosis: DYSPEPSIA[ICD9: 536.8]

Diagnosis: IBS[ICD9: 564.1]                                     Akanksha DRIVER [facility]                   CPT-4: 

75925                                   10/10/2012                

 

                                                            OFFICE/OUTPATIENT VI

SIT EST

Diagnosis: DIZZINESS/VERTIGO[ICD9: 780.4]

Diagnosis: HYPOGLYCEMIA[ICD9: 251.2]                                     

Akanksha DRIVER [facility]            

   

                          CPT-4: 11555                   2012             

   

 

                                                            (73244) OFFICE/OUTPA

TIENT VISIT EST

Diagnosis: URINARY TRACT INFECTION[ICD9: 599.0]                                 
                          Akanksha MEJIA [facility]            

                          CPT-4: 95010                   2012             

   

 

                                                            (98349) OFFICE/OUTPA

TIENT VISIT EST

Diagnosis: HYPERLIPIDEMIA NEC/NOS[ICD9: 272.4]

Diagnosis: HYPERTENSION[ICD9: 401.9]

Diagnosis: MALAISE AND FATIGUE[ICD9: 780.79]

Diagnosis: DIZZINESS/VERTIGO[ICD9: 780.4]                                     

Akanksha DRIVER [facility]            

   

                          CPT-4: 13540                   2012             

   

 

                                                            (89733) OFFICE/OUTPA

TIENT VISIT EST

Diagnosis: DIZZINESS/VERTIGO[ICD9: 780.4]

Diagnosis: MALAISE AND FATIGUE[ICD9: 780.79]

Diagnosis: HYPERTENSION[ICD9: 401.9]                                     

Akanksha DRIVER [facility]            

   

                          CPT-4: 80631                   2012             

   

 

                                                            OFFICE/OUTPATIENT VI

SIT EST

Diagnosis: LUMB/LUMBOSAC DISC DEGEN[ICD9: 722.52]

Diagnosis: Radiculopathy of leg[ICD9: 724.4]

Diagnosis: SACROILIITIS NEC[ICD9: 720.2]

Diagnosis: SPINAL ENTHESOPATHY[ICD9: 720.1]                                     

Akanksha DRIVER [facility]            

   

                          CPT-4: 19041                   2012             

   

 

                                                            (28549) OFFICE/OUTPA

TIENT VISIT EST

Diagnosis: PAIN, LOWER BACK[ICD9: 724.2]

Diagnosis: SPASM OF MUSCLE[ICD9: 728.85]

Diagnosis: SCIATICA[ICD9: 724.3]

Diagnosis: Lumbar degenerative disc disease[ICD9: 722.52]                       
                          Akanksha MEJIA [facility]  

                          CPT-4: 31700                   2012             

   

 

                                                            (51850) OFFICE/OUTPA

TIENT VISIT EST

Diagnosis: PAIN IN THORACIC SPINE[ICD9: 724.1]

Diagnosis: SPASM OF MUSCLE[ICD9: 728.85]                                     

Akanksha DRIVER [facility]            

   

                          CPT-4: 26147                   2012             

   

 

                                                            (15948) OFFICE/OUTPA

TIENT VISIT EST

Diagnosis: PAIN, LOWER BACK[ICD9: 724.2]

Diagnosis: SPASM OF MUSCLE[ICD9: 728.85]

Diagnosis: Sacroiliac dysfunction[ICD9: 739.4]

Diagnosis: Lumbar degenerative disc disease[ICD9: 722.52]                       
                          Akanksha MEJIA [facility]  

                          CPT-4: 45668                   2012             

   

 

                                                            OFFICE/OUTPATIENT VI

SIT EST

Diagnosis: MALAISE AND FATIGUE[ICD9: 780.79]

Diagnosis: HYPOTENSION[ICD9: 458.9]

Diagnosis: CAD[ICD9: 414.00]                                     Akanksha DRIVER [facility]                   CPT-4: 

47306                                   2012                

 

                                                            OFFICE/OUTPATIENT VI

SIT EST

Diagnosis: SINUSITIS, ACUTE[ICD9: 461.9]

Diagnosis: PHARYNGITIS, ACUTE[ICD9: 462]

Diagnosis: CERUMEN IMPACTION[ICD9: 380.4]                                     

[facility]            

   

                          CPT-4: 82927                   2011             

   

 

                                                            OFFICE/OUTPATIENT VI

SIT EST

Diagnosis: PAIN IN THORACIC SPINE[ICD9: 724.1]

Diagnosis: GERD[ICD9: 530.81]

Diagnosis: DIZZINESS/VERTIGO[ICD9: 780.4]                                     

[facility]            

   

                          CPT-4: 36536                   2011             

   

 

                                                            OFFICE/OUTPATIENT VI

SIT EST                                 

                          Akanksha MCKEON

[facility]            

                          CPT-4: 41888                   2011             

   

 

                                                            (67407) OFFICE/OUTPA

TIENT VISIT EST                         

                          Akanksha MCKEON

[facility]    

                          CPT-4: 05219                   2011             

   

 

                                                            (64190) OFFICE/OUTPA

TIENT VISIT, EST

Diagnosis: PAIN, LOWER BACK[ICD9: 724.2]                                     

[facility]            

   

                          CPT-4: 01307                   2011             

   

 

                                                            (67561) OFFICE/OUTPA

TIENT VISIT, EST                        

                          Akanksha BAUMAN SAramis ORE

[facility]   

                          CPT-4: 00970                   2011             

   

 

                                                            (25798) OFFICE/OUTPA

TIENT VISIT, EST                        

                          Akanksha MCKEON

[facility]   

                          CPT-4: 45851                   2010             

   

 

                                                            (47279) OFFICE/OUTPA

TIENT VISIT, EST                        

                          Akanksha BAUMAN SAramis [facility]

[facility]   

                          CPT-4: 43976                   2010             

   

 

                                                            (03020) OFFICE/OUTPA

TIENT VISIT, EST                        

                          Akanksha BAUMAN S. ORE

[facility]   

                          CPT-4: 96004                   2010             

   

 

                                                            (28387) OFFICE/OUTPA

TIENT VISIT, EST                        

                          Akanksha BAUMAN S. ORE

[facility]   

                          CPT-4: 78406                   2010             

   

 

                                                            (90952) OFFICE/OUTPA

TIENT VISIT, EST                        

                          Akanksha MCKEON

[facility]   

                          CPT-4: 91723                   2010             

   

 

                                                            (69189) OFFICE/OUTPA

TIENT VISIT, EST                        

                          Akanksha MCKEON

[facility]   

                          CPT-4: 95246                   2010             

   



                                                                                
                                                                                
                                                                                
                                                                                
                                                                                
                                                                                
                                                                                
                                                                                
                                                                                
                                                                                
                            



Plan of Care

                      



                    Planned Activity                   Notes                   C

odes                

                          Status                    Date                

 

                          Visit Diagnosis Plan: Acute serous otitis media, left 

ear                   

Discussion: instructed to use flonase and claritin daily for symptom relief. 
discussed with patient that if no improvement in 2 weeks, will need to follow up
with ENT for audiology exam. instructed to call office with any other symptoms 
such as otalgia, dysphagia, fever, etc.

                                        ICD-9 : 381.01

ICD-10 : H65.02

                                                    2019                

 

                                        Appointment: Nat Lozoya

                                        [address] [place] [address]                                                           ACUTE ILLNESS      

 

                                        2019                

 

                          Visit Diagnosis Plan: Essential (primary) hypertension

                   

Discussion: Stable Check CMP

                                        ICD-9 : 401.9

ICD-10 : I10

                                                    06/10/2019                

 

                          Visit Diagnosis Plan: Hypoglycemia, unspecified       

            Discussion: 

Monitor BS At least 6 small meals a day each with protein

                                        ICD-9 : 251.2

ICD-10 : E16.2

                                                    06/10/2019                

 

                          Visit Diagnosis Plan: Other fatigue                   

Discussion: Check CBC, TSH

                                        ICD-9 : 780.79

ICD-10 : R53.83

                                                    06/10/2019                

 

                                        Visit Diagnosis Plan: Urinary tract infe

ction, site not specified               

                                        Discussion: Started an old abx prescript

ion

                                        ICD-9 : 599.0

ICD-10 : N39.0

                                                    06/10/2019                

 

                                        Appointment: Akanksha Driver

WPtel:[phone]

                                        [facility] [facility]

                                                           FOLLOW UP          

 

                                        06/10/2019                

 

                                        Visit Diagnosis Plan: Urinary tract infe

ction, site not specified               

                                        Discussion: Macrobid 100mg po BID for 1 

week

                                        ICD-9 : 599.0

ICD-10 : N39.0

                                                    2019                

 

                                        Visit Diagnosis Plan: Gastro-esophageal 

reflux disease without esophagitis      

                                        Discussion: Stable on omeprazole

Follow Up: 3 months

                                        ICD-9 : 530.81

ICD-10 : K21.9

                                                    2019                

 

                          Visit Diagnosis Plan: Essential (primary) hypertension

                   

Discussion: Stable Sees Dr. Clements this month

                                        ICD-9 : 401.9

ICD-10 : I10

                                                    2019                

 

                                        Appointment: Akanksha Driver

WPtel:[phone]

                                        [address] [facility]

[place]                                                           FOLLOW UP          

 

                                        2019                

 

                                        Patient Education: metoprolol tartrate- 

[employer] Coupon [id number]              

                                        

[email or web address]/resources/[email or web address]

[email or web address]                                             Completed       

      

                                        2019                

 

                                        Appointment: Akanksha Driver

WPtel:[phone]

                                        [address] [facility]

[place]                                                           CANCELED           

 

                                        02/15/2019                

 

                          Visit Diagnosis Plan: Epigastric pain                 

  Discussion: Check CT 

abdomen/pelvis due to ongoing abdominal pain

                                        ICD-9 : 789.06

ICD-10 : R10.13

                                                    2018                

 

                                        Visit Diagnosis Plan: Gastro-esophageal 

reflux disease without esophagitis      

                                        Discussion: Continue protonix and carafa

te Proceed with updated EGD

                                        ICD-9 : 530.81

ICD-10 : K21.9

                                                    2018                

 

                                        Appointment: Akanksha Driver

WPtel:[phone]

                                        [facility]

[place]                                                           FOLLOW UP          

 

                                        2018                

 

                    Care Plan: CT PELVIS W/O DYE                                

       LOINC : 

76684-7

                          Pending                   2018                

 

                    Care Plan: CT ABDOMEN W/O DYE                               

        LOINC : 

76535-6

                          Pending                   2018                

 

                    Care Plan: Referral Order                                   

    SNOMED-CT : 

[id number]

                          Pending                   2018                

 

                          Visit Diagnosis Plan: Generalized anxiety disorder    

               Discussion:

Stable

                                        ICD-9 : 300.00

ICD-10 : F41.1

                                                    2018                

 

                                        Visit Diagnosis Plan: Gastro-esophageal 

reflux disease without esophagitis      

                                        Discussion: Continue protonix at BID dos

ing and carafate BID

Follow Up: 2 months

                                        ICD-9 : 530.81

ICD-10 : K21.9

                                                    2018                

 

                          Visit Diagnosis Plan: Essential (primary) hypertension

                   

Discussion: Stable

                                        ICD-9 : 401.9

ICD-10 : I10

                                                    2018                

 

                                        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

art disease of native coronary artery 

without angina pectoris                   Discussion: S/P cardiac cath--doing 

medical management with imdur and metoprolol increased Has fwup with cardiology 
next week Discussed cardiac rehab

                                        ICD-9 : 414.00

ICD-10 : I25.10

                                                    2018                

 

                                        Appointment: Akanksha Driver

WPtel:[phone]

                                        [address] [place], [place] [id number]

                                                           FOLLOW UP          

 

                                        2018                

 

                                        Visit Diagnosis Plan: Gastro-esophageal 

reflux disease without esophagitis      

                                        Discussion: Continue protonix at BID dos

ing Continue carafate at q 

AC and HS dosing for 2 more weeks then decrease to BID dosing

Follow Up: 4 weeks

                                        ICD-9 : 530.81

ICD-10 : K21.9

                                                    10/02/2018                

 

                          Visit Diagnosis Plan: Generalized anxiety disorder    

               Discussion:

Increase xanax to BID dosing especially with upcoming cardiac testing which is 
already making patient more anxious and worried

                                        ICD-9 : 300.00

ICD-10 : F41.1

                                                    10/02/2018                

 

                          Visit Diagnosis Plan: Palpitations                   D

iscussion: Cardilology 

going to schedule Lexiscan

                                        ICD-9 : 785.1

ICD-10 : R00.2

                                                    10/02/2018                

 

                                        Visit Diagnosis Plan: Urinary tract infe

ction, site not specified               

                                        Discussion: Reculture urine

                                        ICD-9 : 599.0

ICD-10 : N39.0

                                                    10/02/2018                

 

                                        Appointment: Akanksha Driver

WPtel:[phone]

                                        [address] [place], [place] [address]

                                                           FOLLOW UP          

 

                                        10/02/2018                

 

                          Patient Education: Patient Medication Summary         

                          

                                        Completed                   10/02/2018  

              

 

                                        Appointment: Akanksha Driver

WPtel:[phone]

                                        [address] [place], [place] [address]

[place]                                                           LAB                

 

                                        2018                

 

                          Patient Education: Patient Medication Summary         

                          

                                        Completed                   2018  

              

 

                          Visit Diagnosis Plan: Epigastric pain                 

  Discussion: Continue 

protonix and carafate but make into a slurry Flu shot given Discussed EGD if 
symptoms persist

Follow Up: 2 weeks

                                        ICD-9 : 789.06

ICD-10 : R10.13

                                                    2018                

 

                          Visit Diagnosis Plan: Generalized anxiety disorder    

               Discussion:

Did discuss increased risk of benzodiazepines causing dementia but at this time 
will stay at xanax 0.25mg po BID

                                        ICD-9 : 300.00

ICD-10 : F41.1

                                                    2018                

 

                                        Appointment: Akanksha Driver

WPtel:[phone]

                                        [address] [place] [address]                                                           FOLLOW UP          

 

                                        2018                

 

                          Patient Education: Patient Medication Summary         

                          

                                        Completed                   2018  

              

 

                          Visit Diagnosis Plan: Essential (primary) hypertension

                   

Discussion: Has hydralazine to use prn per cardiology Going to do 30 day holter 
monitor

                                        ICD-9 : 401.9

ICD-10 : I10

                                                    2018                

 

                          Visit Diagnosis Plan: Hypoglycemia, unspecified       

            Discussion: 6 

small meals a day--each with protein Increase fluid intake

                                        ICD-9 : 251.2

ICD-10 : E16.2

                                                    2018                

 

                                        Visit Diagnosis Plan: Gastro-esophageal 

reflux disease without esophagitis      

                                        Discussion: Change to protonix 40mg po B

ID

Follow Up: 1 months

                                        ICD-9 : 530.81

ICD-10 : K21.9

                                                    2018                

 

                                        Appointment: Akanksha Driver

WPtel:[phone]

                                        [address] [place] [address]                                                           ACUTE ILLNESS      

 

                                        2018                

 

                          Patient Education: Patient Medication Summary         

                          

                                        Completed                   2018  

              

 

                          Visit Diagnosis Plan: Dizziness and giddiness         

          Discussion: 

blood work to be completed in office including cmp, cbc, troponin to rule out 
glucose intolerance, infection, dehydration. instructed patient that she needs 
to see her cardiologist sooner than oct and patient requested we contact dr. clements's office. will have front office make appt. patient has carotid doppler 
annually.

                                        ICD-9 : 780.4

ICD-10 : R42

                                                    2018                

 

                                        Appointment: Nat Lozoya

                                        [address] [place] [address]                                                           ACUTE ILLNESS      

 

                                        2018                

 

                          Patient Education: Patient Medication Summary         

                          

                                        Completed                   2018  

              

 

                          Visit Diagnosis Plan: Cervicalgia                   Di

scussion: Complete course 

of PT since symptoms improved Continue stretching exercises Notify if symptoms 
return or worsen

                                        ICD-9 : 723.1

ICD-10 : M54.2

                                                    2018                

 

                                        Appointment: Akanksha Driver

WPtel:[phone]

                                        [address] [place], [place] [address]

                                                           FOLLOW UP          

 

                                        2018                

 

                          Patient Education: Patient Medication Summary         

                          

                                        Completed                   2018  

              

 

                          Visit Diagnosis Plan: Hypoglycemia, unspecified       

            Discussion: 

Eat something with protein every 2 hrs

                                        ICD-9 : 251.2

ICD-10 : E16.2

                                                    2018                

 

                                        Visit Diagnosis Plan: Other spondylosis 

with radiculopathy, cervical region     

                                        Discussion: Check MRI of cervical spine

                                        ICD-9 : 721.0

ICD-10 : M47.22

                                                    2018                

 

                          Visit Diagnosis Plan: Vertigo of central origin, bilat

eral                   

Discussion: Discussed PT for vestibular therapy

                                        ICD-9 : 386.2

ICD-10 : H81.43

                                                    2018                

 

                                        Appointment: Akanksha Driver

WPtel:[phone] [address] [facility]

                                                                             

2018                

 

                          Patient Education: Patient Medication Summary         

                          

                                        Completed                   2018  

              

 

                    Care Plan: MRI NECK SPINE W/O DYE                           

            LOINC : 

34211-7

                          Pending                   2018                

 

                          Visit Diagnosis Plan: Benign paroxysmal vertigo, bilat

eral                   

Discussion: patient has meclizine at home but states it makes her too tired. 
instructed patient to cut in half and take at night. if it doesn't cause too 
much drowsiness, instructed to take bid until feeling better. instructed to rtc 
wed if no improvement or worsening symptoms. instructed patient to increase 
fluid intake as well.

                                        ICD-9 : 386.11

ICD-10 : H81.13

                                                    2018                

 

                          Visit Diagnosis Plan: Otalgia, bilateral              

     Discussion: kenalog 

40 mg given to patient im. instructed patient to monitor blood sugar at home due
to risk of increased sugar. instructed patient to rtc on wednesday if no 
improvement and may require antibiotic.

                                        ICD-9 : 388.70

ICD-10 : H92.03

                                                    2018                

 

                                        Appointment: Nat Lozoya

                                        [address] [place] [id number]

                                                           ACUTE ILLNESS      

 

                                        2018                

 

                          Patient Education: Patient Medication Summary         

                          

                                        Completed                   2018  

              

 

                          Visit Diagnosis Plan: Low back pain                   

Discussion: Stretches 

Topical aspercreme Tylenol 1 po TID

                                        ICD-9 : 724.2

ICD-10 : M54.5

                                                    2018                

 

                          Visit Diagnosis Plan: Radiculopathy, lumbosacral regio

n                   

Discussion: As above

                                        ICD-9 : 724.4

ICD-10 : M54.17

                                                    2018                

 

                          Visit Diagnosis Plan: Left lower quadrant pain        

           Discussion: 

Culture urine Notify if worsens

                                        ICD-9 : 789.04

ICD-10 : R10.32

                                                    2018                

 

                                        Appointment: Akanksha Driver

WPtel:[phone]

                                        [address] [place] [address]                                                           ACUTE ILLNESS      

 

                                        2018                

 

                          Patient Education: Patient Medication Summary         

                          

                                        Completed                   2018  

              

 

                                        Appointment: Akanksha Driver

WPtel:[phone]

                                        [address] [place] [address]

[place]                                                           INJECTION          

 

                                        10/06/2017                

 

                          Patient Education: Patient Medication Summary         

                          

                                        Completed                   10/06/2017  

              

 

                                        Appointment: Akanksha Driver

WPtel:[phone]

                                        [address] [place] [address]                                                           LAB                

 

                                        2017                

 

                          Patient Education: Patient Medication Summary         

                          

                                        Completed                   2017  

              

 

                          Visit Diagnosis Plan: Pain in thoracic spine          

         Discussion: PT 

has helped Continue stretches and walking Discussed joining [facility] to 
continue exercise

                                        ICD-9 : 724.1

ICD-10 : M54.6

                                                    2017                

 

                                        Visit Diagnosis Plan: Other intervertebr

al disc degeneration, lumbar region     

                                        Follow Up: 3 months

                                        ICD-9 : 722.52

ICD-10 : M51.36

                                                    2017                

 

                                        Appointment: Akanksha Driver

WPtel:[phone]

                                        [address] [place] [address]                                                           FOLLOW UP          

 

                                        2017                

 

                          Patient Education: Patient Medication Summary         

                          

                                        Completed                   2017  

              

 

                          Visit Diagnosis Plan: Low back pain                   

Discussion: Start PT for 

low back- Seems like abdominal pain is radiating from low back

Follow Up: 5 weeks

                                        ICD-9 : 724.2

ICD-10 : M54.5

                                                    2017                

 

                          Visit Diagnosis Plan: Left lower quadrant pain        

           Discussion: Had

CT scan of abdomen/pelvis in 2017 and normal colonoscopy in 2015

                                        ICD-9 : 789.04

ICD-10 : R10.32

                                                    2017                

 

                                        Appointment: Akanksha Driver

WPtel:[phone]

                                        [address] [place] [address]                                                           ACUTE ILLNESS      

 

                                        2017                

 

                          Patient Education: Patient Medication Summary         

                          

                                        Completed                   2017  

              

 

                                        Appointment: Akanksha Driver

WPtel:[phone]

                                        [address] [place] [address]

[place]                                                           LAB                

 

                                        2017                

 

                          Patient Education: Patient Medication Summary         

                          

                                        Completed                   2017  

              

 

                          Visit Diagnosis Plan: Mixed hyperlipidemia            

       Discussion: Check 

lipids

                                        ICD-9 : 272.4

ICD-10 : E78.2

                                                    2017                

 

                          Visit Diagnosis Plan: Impaired fasting glucose        

           Discussion: 

Return in AM for CMP, HbA1C Accuchecks daily Diet/Exercise discussed at length

                                        ICD-9 : 790.29

ICD-10 : R73.01

                                                    2017                

 

                          Visit Diagnosis Plan: Other fatigue                   

Discussion: Check CBC, TSH

                                        ICD-9 : 780.79

ICD-10 : R53.83

                                                    2017                

 

                          Visit Diagnosis Plan: Mastodynia                   Dis

cussion: Check Bilateral 

diagnostic mammogram with US

                                        ICD-9 : 611.71

ICD-10 : N64.4

                                                    2017                

 

                                        Appointment: Akanksha Driver

WPtel:[phone]

                                        [address] [place] [address]                   3/ confirmed `sl                                       

ACUTE ILLNESS                           2017                

 

                          Patient Education: Patient Medication Summary         

                          

                                        Completed                   2017  

              

 

                    Care Plan: MAMMOGRAM SCREENING                              

         [facility] : 

[id number]

                          Pending                   2017                

 

                                        Visit Diagnosis Plan: Acute upper respir

atory infection, unspecified            

                                        Discussion: Exam is reassuring Likely vi

ral illness Supportive care 

reviewed and encouraged Follow up PRN

                                        ICD-9 : 465.9

ICD-10 : J06.9

                                                    2017                

 

                                        Appointment: Luba Stevenson 

                                        [address] [place] [address]                                                           ACUTE ILLNESS      

 

                                        2017                

 

                          Patient Education: Patient Medication Summary         

                          

                                        Completed                   2017  

              

 

                          Visit Plan:                    Supportive care. Rest, 

Fluids, Tylenol/Motrin prn

fever or bodyaches. Notify if worsening symptoms.

                                                            2016          

  

   

 

                                        Appointment: Akanksha Driver

WPtel:[phone]

                                        [address] [place] [address]                                                           ACUTE ILLNESS      

 

                                        2016                

 

                          Patient Education: Patient Medication Summary         

                          

                                        Completed                   2016  

              

 

                                        Appointment: Akanksha Driver

WPtel:[phone]

                                        [facility]

[place]                                                           INJECTION          

 

                                        10/07/2016                

 

                          Patient Education: Patient Medication Summary         

                          

                                        Completed                   10/07/2016  

              

 

                                        Appointment: Akanksha Driver

WPtel:[phone]

                                        [address] [place], [place] [id number]

[place]                                                           LAB                

 

                                        2016                

 

                          Patient Education: Patient Medication Summary         

                          

                                        Completed                   2016  

              

 

                          Visit Plan:                    Add miralax daily Use d

aily probiotic and 

metamucil and push fluids Notify if worsens/persists

                                                            2016          

  

   

 

                                        Appointment: Akanksha Driver

WPtel:[phone]

                                        [address] [place], [place] [id number]

                        16 confirmed ~sl                                 

     

                          ACUTE ILLNESS                   2016            

    

 

                          Patient Education: Patient Medication Summary         

                          

                                        Completed                   2016  

              

 

                          Visit Plan:                    No NSAIDs Kidney functi

on discussed Discussed 

hydration Monitor lab every 4months so will check CMP, HbA1C next month

                                                            2016          

  

   

 

                          Visit Plan:                    No NSAIDs Kidney functi

on discussed Discussed 

hydration Monitor lab every 4months so will check CMP, HbA1C next month

                                                            2016          

  

   

 

                                        Appointment: Akanksha Driver

WPtel:[phone]

                                        [address] [place], [place] [id number]

                   03/15 confirmed ~sl                                       

ACUTE ILLNESS                           2016                

 

                          Patient Education: Patient Medication Summary         

                          

                                        Completed                   2016  

              

 

                          Visit Plan:                    Vestibular exercises Re

fill flonase Strict low Na

diet--discussed that needs to read labels Rx for walker given Has carotids and 
abdominal aorta and legs checked in January Check Chem 7

                                                            2015          

  

   

 

                          Visit Plan:                    Vestibular exercises Re

fill flonase Strict low Na

diet--discussed that needs to read labels Rx for walker with chair given due to 
muscle weakness/unsteadiness Has carotids and abdominal aorta and legs checked 
in January Check Chem 7

                                                            2015          

  

   

 

                          Visit Plan:                    Vestibular exercises Re

fill flonase Strict low Na

diet--discussed that needs to read labels Rx for walker given Has carotids and 
abdominal aorta and legs checked in January Check Chem 7

                                                            2015          

  

   

 

                          Visit Plan:                    Vestibular exercises Re

fill flonase Strict low Na

diet--discussed that needs to read labels Rx for walker with chair given due to 
muscle weakness/unsteadiness Has carotids and abdominal aorta and legs checked 
in January Check Chem 7

                                                            2015          

  

   

 

                                        Appointment: Akanksha Driver

WPtel:[phone]

                                        [address] [place] [address]                        12/15/15 appt confirmed cn                            

     

                          FOLLOW UP                   2015                

 

                          Patient Education: Patient Medication Summary         

                          

                                        Completed                   2015  

              

 

                    Patient Education: Vertigo                                  

                     

                          Completed                   2015                

 

                                        Appointment: Akanksha Driver

WPtel:[phone]

                                        [address] [place] [address]                                                           INJECTION          

 

                                        10/16/2015                

 

                          Patient Education: Patient Medication Summary         

                          

                                        Completed                   10/16/2015  

              

 

                                        Appointment: Akanksha Driver

WPtel:[phone]

                                        [address] [place] [address]                 

 

                                        09/15/2015                

 

                          Patient Education: Patient Medication Summary         

                          

                                        Completed                   09/15/2015  

              

 

                                        Referral: Landon De La Torre

WPtel:[phone]

[address]                   Referral                                       Completed   

                                        2015                

 

                                        Appointment: Akanksha Driver

WPtel:[phone]

                                        [address] [place] [address]                                                           LAB                

 

                                        09/10/2015                

 

                          Patient Education: Patient Medication Summary         

                          

                                        Completed                   09/10/2015  

              

 

                          Visit Plan:                    Check CMP, CBC, TSH, Fr

ee T4, B12, Lipids, HbA1C 

Discussed 6 small meals a day each with protein Would likely benefit from 
antidepressant Update colonoscopy

                                                            2015          

  

   

 

                                        Appointment: Akanksha Driver

WPtel:[phone]

                                        [address] [place] [address]                   9/8/15 confirmed                                       

ACUTE ILLNESS                           2015                

 

                          Patient Education: Patient Medication Summary         

                          

                                        Completed                   2015  

              

 

                          Visit Plan:                    Cerumen flush with warm

 water and peroxide - good

results Resume Nasonex nasal spray daily Recommended Debrox earwax removal drops

                                                            2015          

  

   

 

                          Visit Plan:                    Cerumen flush with warm

 water and peroxide - good

results Resume Nasonex nasal spray daily Recommended Debrox earwax removal drops

                                                            2015          

  

   

 

                                        Appointment: Bertha Mon

WPtel:[phone]

                                        [address] [place], [place] [id number]                                                           ACUTE ILLNESS      

 

                                        2015                

 

                          Patient Education: Patient Medication Summary         

                          

                                        Completed                   2015  

              

 

                          Visit Plan:                    Continue aspirin daily 

Add meloxicam for 1week 

Elevate and Ice Call on 2015          

  

   

 

                                        Appointment: Akanksha Driver

WPtel:[phone]

                                        [address] [place], [place] [address]                                                           ACUTE ILLNESS      

 

                                        2015                

 

                          Patient Education: Patient Medication Summary         

                          

                                        Completed                   2015  

              

 

                          Visit Plan:                    Been using baclofen at 

bedtime and has helped 

some PT for next 2-4weeks

                                                            2015          

  

   

 

                                        Appointment: Akanksha Driver

WPtel:[phone]

                                        [address] [place], [place] [id number] [place] [address] Follow Up 

 

                                        2015                

 

                          Patient Education: Patient Medication Summary         

                          

                                        Completed                   2015  

              

 

                                        Appointment: Akanksha Driver

WPtel:[phone]

                                        [address] [place], [place] [address]                                                           LAB                

 

                                        2015                

 

                                        Appointment: Akanksha Driver

WPtel:[phone]

                                        [address] [place], [place] [address]                        feeling better, weather -                           

     

                          FOLLOW UP                   2015                

 

                                        Referral: Landon De La Torre

WPtel:[phone]

[facility]                   Referral                                       Appointment 

Requested                               2015                

 

                          Visit Plan:                    Continue exercise and c

urrent meds See surgery 

for removal of right arm lesion

                                                            2015          

  

   

 

                                        Appointment: Akanksha Driver

WPtel:[phone]

                                        [address] [place], [place] [id number]

                                                           FOLLOW UP          

 

                                        2015                

 

                          Patient Education: Patient Medication Summary         

                          

                                        Completed                   2015  

              

 

                                        Appointment: Akanksha Driver

WPtel:[phone]

                                        [address] [place], [place] [id number]

US                                                           INJECTION          

 

                                        10/17/2014                

 

                          Patient Education: Patient Medication Summary         

                          

                                        Completed                   10/17/2014  

              

 

                          Visit Plan:                    Resume Claritin PO kathleen

y May take Ibuprofen PM at

night for sleep Continue Flonase 2 sprays each nostril bid Complete prednisone 
20 mg PO bid

                                                            2014          

  

   

 

                                        Appointment: Bertha Mon

WPtel:[phone]

                                        [address] [place], [place] [id number]

                                                           ACUTE ILLNESS      

 

                                        2014                

 

                          Patient Education: Patient Medication Summary         

                          

                                        Completed                   2014  

              

 

                          Visit Plan:                    Discussed that likely l

umbar etiology for leg 

weakness Will change amlodopine to low dose dyazide and see if helps legs and 
inner ear

                                                            2014          

  

   

 

                                        Appointment: Akanksha Driver

WPtel:[phone]

                                        [address] [place] [address]                                                           FOLLOW UP          

 

                                        2014                

 

                          Patient Education: Patient Medication Summary         

                          

                                        Completed                   2014  

              

 

                          Visit Plan:                    Ceftin and continue cla

ritin/flonase Decrease 

amlodopine to 2.5mg q HS until fwup with Card due to weakness

                                                            2014          

  

   

 

                                        Appointment: Akanksha Driver

WPtel:[phone]

                                        [address] [place] [address]                                                           ACUTE ILLNESS      

 

                                        2014                

 

                          Patient Education: Patient Medication Summary         

                          

                                        Completed                   2014  

              

 

                                        Appointment: Akanksha Driver

WPtel:[phone]

                                        [address] [place], [place] [id number]                                                           LAB                

 

                                        2014                

 

                          Patient Education: Patient Medication Summary         

                          

                                        Completed                   2014  

              

 

                          Visit Plan:                    States she is having he

r carotids "done" this 

14 Return this week for fasting labs. CBC, CMP, TSH Free T4, 
Lipid Panel and HgbA1C.

                                                            2014          

  

   

 

                                        Appointment: Bertha Mon

WPtel:[phone]

                                        [address] [place], [place] [address]

                                                           ACUTE ILLNESS      

 

                                        2014                

 

                          Patient Education: Patient Medication Summary         

                          

                                        Completed                   2014  

              

 

                          Visit Plan:                    Supportive care. Rest, 

Fluids, Tylenol prn fever 

or bodyaches. Notify if worsening symptoms. Ceftin

                                                            10/15/2013          

  

   

 

                                        Appointment: Bertha Mon

WPtel:[phone]

                                        [address] [place], [place] [id number]

                                                           ACUTE ILLNESS      

 

                                        10/15/2013                

 

                          Patient Education: Patient Medication Summary         

                          

                                        Completed                   10/15/2013  

              

 

                                        Appointment: Akanksha Driver

WPtel:[phone]

                                        [address] [place], [place] [address]                                                           BP CHECK           

 

                                        2013                

 

                          Patient Education: Patient Medication Summary         

                          

                                        Completed                   2013  

              

 

                          Visit Plan:                    Continue increased dose

 of metoprolol Moniter BP 

at home BP check in 1wk Xanax refill to use prn

                                                            2013          

  

   

 

                          Visit Plan:                    Continue increased dose

 of metoprolol Moniter BP 

at home BP check in 1wk

                                                            2013          

  

   

 

                                        Appointment: Akanksha Driver

WPtel:[phone]

                                        [address] [place] [address]                                                           ER Follow UP       

 

                                        2013                

 

                          Patient Education: Patient Medication Summary         

                          

                                        Completed                   2013  

              

 

                          Visit Plan:                    Restart flonase BID Use

 meclizine 25mg q HS 

Vestibular exercises Claritin 10mg q AM See ENT if doesn't resolve

                                                            2013          

  

   

 

                                        Appointment: Akanksha Driver

WPtel:[phone]

                                        [address] [place] [address]                                                           ACUTE ILLNESS      

 

                                        2013                

 

                          Patient Education: Patient Medication Summary         

                          

                                        Completed                   2013  

              

 

                          Visit Plan:                    Will continue to observ

e

                                                            2013          

  

   

 

                                        Appointment: Akanksha Driver

WPtel:[phone]

                                        [address] [place], [place] [id number]

                                                           FOLLOW UP          

 

                                        2013                

 

                          Patient Education: Patient Medication Summary         

                          

                                        Completed                   2013  

              

 

                          Visit Plan:                    ERx for Augmentin 875mg

 q12 x 10 days and Floxin 

otic drops 5 gtts AD x7days Sample of Nasonex per pt request Discussed treatment
(nasal saline, salt water gargles, keeping right ear clean and dry, for 
worsening go to UC on weekend, Tylenol/ibuprofen, etc.) RTC 2 weeks for ear 
recheck.

                                                            05/10/2013          

  

   

 

                                        Appointment: Jill Jean 

WPtel:[phone]

                                        [address] [place], [place] [id number]                                                           ACUTE ILLNESS      

 

                                        05/10/2013                

 

                          Patient Education: Patient Medication Summary         

                          

                                        Completed                   05/10/2013  

              

 

                          Visit Plan:                    Decrease caffeine intak

e Check Bilateral 

Mammogram with US of left breast

                                                            2013          

  

   

 

                                        Appointment: Akanksha Driver

WPtel:[phone]

                                        [address] [place], [place] [id number]

                                                           ACUTE ILLNESS      

 

                                        2013                

 

                          Patient Education: Patient Medication Summary         

                          

                                        Completed                   2013  

              

 

                                        Appointment: Kate Hall

WPtel:[phone]

                                        [address] [place], [place] [id number]                   appt scheduled                                        

ACUTE ILLNESS                           2013                

 

                                        Appointment: Akanksha Driver

WPtel:[phone]

                                        [address] [place] [address]                                                           LAB                

 

                                        2012                

 

                          Patient Education: Patient Medication Summary         

                          

                                        Completed                   2012  

              

 

                          Visit Plan:                    Continue off carafate a

nd see how does Continue 

probiotic Add Vestibular exercises and use meclizine prn Continue Nasonex

                                                            2012          

  

   

 

                          Visit Plan:                    Continue off carafate a

nd see how does Continue 

probiotic Add Vestibular exercises and use meclizine prn Continue Nasonex Check 
fasting lab including CMP, Lipids, CBC, TSH, Free T4, HbA1C

                                                            2012          

  

   

 

                                        Appointment: Akanksha Driver

WPtel:[phone]

                                        [address] [place], [place] [address]                                                           FOLLOW UP          

 

                                        2012                

 

                          Patient Education: Patient Medication Summary         

                          

                                        Completed                   2012  

              

 

                          Visit Plan:                    Continue carafate for 4

more weeks at current dose

then decrease to BID for 2wks then q HS

                                                            10/23/2012          

  

   

 

                                        Appointment: Akanksha Driver

WPtel:[phone]

                                        [address] [place], [place] [id number]

                                                           FOLLOW UP          

 

                                        10/23/2012                

 

                          Patient Education: Patient Medication Summary         

                          

                                        Completed                   10/23/2012  

              

 

                          Visit Plan:                    Continue omeprazole Add

 Carafate 1gm po q AC Add 

daily probiotic

                                                            10/10/2012          

  

   

 

                                        Appointment: Akanksha Driver

WPtel:[phone]

                                        [address] [place], [place] [id number]

                                                           ACUTE ILLNESS      

 

                                        10/10/2012                

 

                          Patient Education: Patient Medication Summary         

                          

                                        Completed                   10/10/2012  

              

 

                                        Appointment: Akanksha Driver

WPtel:[phone]

                                        [address] [place], [place] [id number]

US                                                           INJECTION          

 

                                        2012                

 

                          Patient Education: Patient Medication Summary         

                          

                                        Completed                   2012  

              

 

                          Visit Plan:                    Diabetic Diet Accucheck

s BID alternating times 

Hold onglyza and Januvia for now and continue diet and exercise and weight loss 
and moniter BS Discussed hypoglycemia and snack of peanut butter and crackers 
with juice or milk

                                                            2012          

  

   

 

                                        Appointment: Akanksha Driver

WPtel:[phone]

                                        [address] [place] [address]                                                           WORK IN            

 

                                        2012                

 

                          Patient Education: Patient Medication Summary         

                          

                                        Completed                   2012  

              

 

                                        Appointment: Akanksha Drivertel:[phone]

                                        [address] [place] [address]                                                           UA                 

 

                                        2012                

 

                          Patient Education: Patient Medication Summary         

                          

                                        Completed                   2012  

              

 

                                        Appointment: Akanksha Drivertel:[phone]

                                        [address] [place] [address]                                                           LAB                

 

                                        2012                

 

                          Patient Education: Patient Medication Summary         

                          

                                        Completed                   2012  

              

 

                          Visit Plan:                    Fasting lab to check CM

P, Lipids, CBC, TSH, Free 

T4, HbA1C, Insulin level Hold Zocor for next 2wks

                                                            2012          

  

   

 

                                        Appointment: Akanksha Driver

WPtel:[phone]

                                        [address] [place] [address]                                                           ACUTE ILLNESS      

 

                                        2012                

 

                          Patient Education: Patient Medication Summary         

                          

                                        Completed                   2012  

              

 

                          Visit Plan:                    Injection to Right SI j

oint as above Pt will call

in 3 days on pain Has PT starting in 2wks.

                                                            2012          

  

   

 

                                        Appointment: Akanksha Drivertel:[phone]

                                        [address] [place] [address]                                                           ACUTE ILLNESS      

 

                                        2012                

 

                          Patient Education: Patient Medication Summary         

                          

                                        Completed                   2012  

              

 

                          Visit Plan:                    OMT done Start PT May n

eed updated MRI if need to

consider epidural Prednisone

                                                            2012          

  

   

 

                                        Appointment: Akanksha Driver

WPtel:[phone]

                                        [address] [place] [address]

[place]                                                           OMT                

 

                                        2012                

 

                          Patient Education: Patient Medication Summary         

                          

                                        Completed                   2012  

              

 

                          Visit Plan:                    Flexeril and Vimovo OMT

 done Daily stretches

                                                            2012          

  

   

 

                                        Appointment: Akanksha Driver

WPtel:[phone]

                                        [address] [place] [address]                                                           ACUTE ILLNESS      

 

                                        2012                

 

                          Patient Education: Patient Medication Summary         

                          

                                        Completed                   2012  

              

 

                          Visit Plan:                    OMT done Daily stretche

s Moist heat or biofreeze 

Right SI joint injection Call in 1week Vimovo BID

                                                            2012          

  

   

 

                                        Appointment: Akanksha Driver

WPtel:[phone]

                                        [address] [place] [address]                                                           ACUTE ILLNESS      

 

                                        2012                

 

                          Patient Education: Patient Medication Summary         

                          

                                        Completed                   2012  

              

 

                                        Appointment: Akanksha Driver

WPtel:[phone]

                                        [address] [place] [address]                                                           LAB                

 

                                        2012                

 

                          Patient Education: Patient Medication Summary         

                          

                                        Completed                   2012  

              

 

                          Visit Plan:                    Decrease amlodopine to 

1.25mg daily Schedule with

Cardiology Fasting lab in AM

                                                            2012          

  

   

 

                                        Appointment: Akanksha Driver

WPtel:[phone]

                                        [address] [place] [address]                                                           ACUTE ILLNESS      

 

                                        2012                

 

                          Patient Education: Patient Medication Summary         

                          

                                        Completed                   2012  

              

 

                          Visit Plan:                    Saline nasal flushes pr

n. Tylenol/Motrin prn 

headache. Notify if persists/symptoms worsening. Cerumen removal from ears as 
above

                                                            2011          

  

   

 

                                        Appointment: Akanksha Driver

WPtel:[phone]

                                        [address] [place] [address]                                                           ACUTE ILLNESS      

 

                                        2011                

 

                          Patient Education: Patient Medication Summary         

                          

                                        Completed                   2011  

              

 

                                        Appointment: Akanksha Driver

WPtel:[phone]

                                        [address] [place] [address]

US                                                           INJECTION          

 

                                        10/05/2011                

 

                          Patient Education: Patient Medication Summary         

                          

                                        Completed                   10/05/2011  

              

 

                          Visit Plan:                    Continue vimovo for 1mo

re week Increase 

Omeprazole to BID for 1mo then resume QD if stomach improved Vestibular 
exercises with meclizine q HS for next week

                                                            2011          

  

   

 

                                        Appointment: Akanksha Drivertel:[phone]

                                        [address] [place], [place] [id number]

                                                           FOLLOW UP          

 

                                        2011                

 

                          Patient Education: Patient Medication Summary         

                          

                                        Completed                   2011  

              

 

                          Visit Plan:                    Trial of Vimovo 50/200m

g po BID Check UA

                                                            2011          

  

   

 

                                        Appointment: Akanksha Driver

WPtel:[phone]

                                        [address] [place] [address]                                                           ACUTE ILLNESS      

 

                                        2011                

 

                          Patient Education: Patient Medication Summary         

                          

                                        Completed                   2011  

              

 

                                        Appointment: Akanksha Driver

WPtel:[phone]

                                        [address] [place], [place] [address]                                                           LAB                

 

                                        2011                

 

                          Patient Education: Patient Medication Summary         

                          

                                        Completed                   2011  

              

 

                          Visit Plan:                    Saline nasal flushes pr

n. Tylenol/Motrin prn 

headache. Notify if persists/symptoms worsening. Add Veramyst Increase 
Omeprazole to 20mg po BID

                                                            2011          

  

   

 

                                        Appointment: Akanksha Drivertel:[phone]

                                        [address] [place] [address]                                                           ACUTE ILLNESS      

 

                                        2011                

 

                          Patient Education: Patient Medication Summary         

                          

                                        Completed                   2011  

              

 

                          Visit Plan:                    Injection to right SI j

oint as above Continue 

Vimovo Daily stretches Pt will call at end of week to let us know how back is 
doing

                                                            2011          

  

   

 

                                        Appointment: Akanksha Driver

WPtel:[phone]

                                        [address] [place], [place] [address]

                                                           FOLLOW UP          

 

                                        2011                

 

                          Patient Education: Patient Medication Summary         

                          

                                        Completed                   2011  

              

 

                          Visit Plan:                    Toradol 30mg IM now x1 

Vimovo 200/50 po BID 

Decrease Norvasc to 1/2 tablet daily Increase Lexaprol to 10mg QD

                                                            2011          

  

   

 

                                        Appointment: Akanksha Driver

WPtel:[phone]

                                        [address] [place], [place] [id number]

                                                           ACUTE ILLNESS      

 

                                        2011                

 

                          Patient Education: Patient Medication Summary         

                          

                                        Completed                   2011  

              

 

                          Visit Plan:                    Nasocourt sample given.

 Pt. will notify if 

symptoms are worse on Monday.

                                                            2010          

  

   

 

                                        Appointment: Kate Hall

WPtel:[phone]

                                        [address] [place], [place] [address]                                                           ACUTE ILLNESS      

 

                                        2010                

 

                          Patient Education: Patient Medication Summary         

                          

                                        Completed                   2010  

              

 

                                        Appointment: Akanksha Driver

WPtel:[phone]

                                        [address] [place], [place] [id number]

US                                                           INJECTION          

 

                                        10/06/2010                

 

                          Patient Education: Patient Medication Summary         

                          

                                        Completed                   10/06/2010  

              

 

                          Visit Plan:                    Cipro for UTI Cont [place]

pro at 5mg QD Flu shot 

next week

                                                            2010          

  

   

 

                                        Appointment: Akanksha Driver

WPtel:[phone]

                                        [address] [place] [address]                                                           FOLLOW UP          

 

                                        2010                

 

                          Patient Education: Patient Medication Summary         

                          

                                        Completed                   2010  

              

 

                    Patient Education: Lexapro                                  

                     

                          Completed                   2010                

 

                          Visit Plan:                    May proceed with hiatal

 hernia repair per Card. 

so will contact Dr. Cash's office to proceed with surgery Trial of 
Lexapro 5mg QD plus use xanax prn

                                                            2010          

  

   

 

                          Visit Plan:                    May proceed with hiatal

 hernia repair per Card. 

so will contact Dr. Cash's office to proceed with surgery

                                                            2010          

  

   

 

                                        Appointment: Akanksha Driver

WPtel:[phone]

                                        [address] [place], [place] [id number]

                                                           FOLLOW UP          

 

                                        2010                

 

                          Patient Education: Patient Medication Summary         

                          

                                        Completed                   2010  

              

 

                          Visit Plan:                    B12 given Cont oral B12

 and iron Fwup 1mo for B12

Proceed with hiatal hernia repair once card clearance

                                                            2010          

  

   

 

                                        Appointment: Akanksha Driver

WPtel:[phone]

                                        [address] [place], [place] [id number]

[place]                                                           FOLLOW UP          

 

                                        2010                

 

                          Patient Education: Patient Medication Summary         

                          

                                        Completed                   2010  

              

 

                          Visit Plan:                    No caffeine, no nicotin

e, no mints, no late 

meals, elevate HOB 30 degrees Cont. with Nexium See Card to discuss Hiatal 
Hernia Repair B12 given

                                                            2010          

  

   

 

                                        Appointment: Akanksha Driver

WPtel:[phone]

                                        [address] [place] [address]                                                           FOLLOW UP          

 

                                        2010                

 

                          Patient Education: Patient Medication Summary         

                          

                                        Completed                   2010  

              

 

                                        Appointment: Akanksha Driver

WPtel:[phone]

                                        [address] [place] [address]

[place]                                                           LAB                

 

                                        2010                

 

                          Patient Education: Patient Medication Summary         

                          

                                        Completed                   2010  

              

 

                          Visit Plan:                    Check fasting lab in AM

--CMP,Lipids, CBC, Vit D, 

TSH,FreeT4, B12

                                                            2010          

  

   

 

                                        Appointment: Akanksha Driver

WPtel:[phone]

                                        [address] [place] [address]                                                           FOLLOW UP          

 

                                        2010                

 

                          Patient Education: Patient Medication Summary         

                          

                                        Completed                   2010  

              

 

                                        Referral: Landon De La Torre

WPtel:[phone]

#1 [address]                   Referral                                       Appointment 

Requested                                               

 

                                        Referral: Landon De La Torre

WPtel:[phone]

#1 [address]                   Referral                                       Initiated   

                                                        



                                                                                
                                                                                
                                                                                
                                                                                
                                                                                
                                                                                
                                                                                
                                                                                
                                                                                
                                                                                
                                                                                
                                                                                
                                                                                
                                                                                
                                                                                
                                                                                
                                                                                
                                                                                
                                                                                
                                                                                
                                                                                
                                                                                
                                                                  



Instructions

                      



                                        Comment                

 

                                                            .  Saline nasal flus

hes prn. Tylenol/Motrin prn headache.  

Notify if persists/symptoms worsening.

Cerumen removal from ears as above

                                  

 

                                                            . ERx for Augmentin 

875mg q12 x 10 days and Floxin otic 

drops 5 gtts AD x7days

Sample of Nasonex per pt request

Discussed treatment (nasal saline, salt water gargles, keeping right ear clean 
and dry, for worsening go to UC on weekend, Tylenol/ibuprofen, etc.)

RTC 2 weeks for ear recheck.                                  

 

                                                            . Add miralax daily

Use daily probiotic and metamucil and push fluids

Notify if worsens/persists                                  

 

                                                            . Continue aspirin d

aily

Add meloxicam for 1week

Elevate and Ice

Call on Monday                                  

 

                                                            . Cipro for UTI

Cont Lexapro at 5mg QD

Flu shot next week                                  

 

                                                            . Continue omeprazol

e

Add Carafate 1gm po q AC

Add daily probiotic

                                  

 

                                                            . States she is havi

ng her carotids "done" this 14

Return this week for fasting labs.  CBC, CMP, TSH Free T4, Lipid Panel and 
HgbA1C.                                  

 

                                                            . B12 given

Cont oral B12 and iron

Fwup 1mo for B12

Proceed with hiatal hernia repair once card clearance                           
      

 

                                                            . Been using baclofe

n at bedtime and has helped some

PT for next 2-4weeks

                                  

 

                                                            . Check fasting lab 

in AM--CMP,Lipids, CBC, Vit D, 

TSH,FreeT4, B12                                  

 

                                                            . OMT done

Start PT

May need updated MRI if need to consider epidural

Prednisone                                  

 

                                                            . Continue increased

 dose of metoprolol

Moniter BP at home

BP check in 1wk

Xanax refill to use prn                                  

 

                                                            . Restart flonase BI

D

Use meclizine 25mg q HS

Vestibular exercises

Claritin 10mg q AM

See ENT if doesn't resolve                                  

 

                                                            . Resume Claritin PO

 daily

May take Ibuprofen PM at night for sleep

Continue Flonase 2 sprays each nostril bid

Complete prednisone 20 mg PO bid



                                  

 

                                                            . Fasting lab to judith

ck CMP, Lipids, CBC, TSH, Free T4, 

HbA1C, Insulin level

Hold Zocor for next 2wks                                  

 

                                                            . Discussed that lik

ely lumbar etiology for leg weakness

Will change amlodopine to low dose dyazide and see if helps legs and inner ear  
                               

 

                                                            . Will continue to o

bserve

                                  

 

                                                            . Injection to right

 SI joint as above

Continue Vimovo

Daily stretches

Pt will call at end of week to let us know how back is doing                    
             

 

                                                            . May proceed with h

iatal hernia repair per Card. so will 

contact Dr. Cash's office to proceed with surgery



Trial of Lexapro 5mg QD plus use xanax prn                                  

 

                                                            . May proceed with h

iatal hernia repair per Card. so will 

contact Dr. Cash's office to proceed with surgery                        
         

 

                                                            . Toradol 30mg IM no

w x1

Vimovo 200/50 po BID

Decrease Norvasc to 1/2 tablet daily

Increase Lexaprol to 10mg QD                                  

 

                                                            . Decrease amlodopin

e to 1.25mg daily

Schedule with Cardiology

Fasting lab in AM                                  

 

                                                            . Continue increased

 dose of metoprolol

Moniter BP at home

BP check in 1wk                                  

 

                                                            Right SI joint clean

sed with alchohol and betadine and 

injected with 3cc 1% lidocaine with 40mg depomedrol and 40mg kenalog, tolerated 
well with no complications, neosporin and bandage applied. OMT done

Daily stretches

Moist heat or biofreeze

Right SI joint injection

Call in 1week

Vimovo BID                                  

 

                                                            . No NSAIDs

Kidney function discussed

Discussed hydration 

Monitor lab every 4months so will check CMP, HbA1C next month                   
              

 

                                                            . No NSAIDs

Kidney function discussed

Discussed hydration 

Monitor lab every 4months so will check CMP, HbA1C next month                   
              

 

                                                            . Vestibular exercis

es

Refill flonase

Strict low Na diet--discussed that needs to read labels

Rx for walker given

Has carotids and abdominal aorta and legs checked in January

Check Chem 7                                  

 

                                                            . Vestibular exercis

es

Refill flonase

Strict low Na diet--discussed that needs to read labels

Rx for walker with chair given due to muscle weakness/unsteadiness

Has carotids and abdominal aorta and legs checked in January

Check Chem 7

                                  

 

                                                            . Vestibular exercis

es

Refill flonase

Strict low Na diet--discussed that needs to read labels

Rx for walker given

Has carotids and abdominal aorta and legs checked in January

Check Chem 7                                  

 

                                                            . Vestibular exercis

es

Refill flonase

Strict low Na diet--discussed that needs to read labels

Rx for walker with chair given due to muscle weakness/unsteadiness

Has carotids and abdominal aorta and legs checked in January

Check Chem 7

                                  

 

                                                            . Cerumen flush with

 warm water and peroxide - good results 

Resume Nasonex nasal spray daily

Recommended Debrox earwax removal drops                                   

 

                                                            . Cerumen flush with

 warm water and peroxide - good results 

Resume Nasonex nasal spray daily

Recommended Debrox earwax removal drops                                   

 

                                                            . Continue vimovo fo

r 1more week

Increase Omeprazole to BID for 1mo then resume QD if stomach improved

Vestibular exercises with meclizine q HS for next week                          
       

 

                                                            . Diabetic Diet

Accuchecks BID alternating times

Hold onglyza and Januvia for now and continue diet and exercise and weight loss 
and moniter BS

Discussed hypoglycemia and snack of peanut butter and crackers with juice or 
milk                                  

 

                                                            . Continue exercise 

and current meds

See surgery for removal of right arm lesion                                  

 

                                                            . Saline nasal flush

es prn. Tylenol/Motrin prn headache.  

Notify if persists/symptoms worsening.

Add Veramyst

Increase Omeprazole to 20mg po BID                                  

 

                                                            . Nasocourt sample g

iven.  Pt. will notify if symptoms are 

worse on Monday.                                   

 

                                                            . Supportive care.  

Rest, Fluids, Tylenol/Motrin prn fever 

or bodyaches.  Notify if worsening symptoms.

                                  

 

                                                            . Continue carafate 

for 4more weeks at current dose then 

decrease to BID for 2wks then q HS                                  

 

                                                            . Decrease caffeine 

intake

Check Bilateral Mammogram with US of left breast                                
 

 

                                                            . Trial of Vimovo 50

/200mg po BID

Check UA                                  

 

                                                            . Flexeril and Vimov

o

OMT done

Daily stretches                                  

 

                                                            . No caffeine, no ni

cotine, no mints, no late meals, elevate

HOB 30 degrees

Cont. with Nexium

See Card to discuss Hiatal Hernia Repair

B12 given                                  

 

                                                            .  Supportive care. 

 Rest, Fluids, Tylenol prn fever or 

bodyaches.  Notify if worsening symptoms.

Ceftin                                  

 

                                                            . Ceftin and continu

e claritin/flonase

Decrease amlodopine to 2.5mg q HS until fwup with Card due to weakness          
                       

 

                                                            . Continue off caraf

ate and see how does

Continue probiotic

Add Vestibular exercises and use meclizine prn

Continue Nasonex                                  

 

                                                            . Check CMP, CBC, TS

H, Free T4, B12, Lipids, HbA1C

Discussed 6 small meals a day each with protein

Would likely benefit from antidepressant 

Update colonoscopy                                  

 

                                                            Left ear flushed wit

h warm water and peroxide with ear 

syringe and then last removed with currette--peroxide drops instilled.  
Tolerated well, no complications, TM intact.. Continue off carafate and see how 
does

Continue probiotic

Add Vestibular exercises and use meclizine prn

Continue Nasonex

Check fasting lab including CMP, Lipids, CBC, TSH, Free T4, HbA1C               
                  

 

                                                            Informed Consent obt

ainded.  Right SI joint cleansed with 

alcohol and betadine and injected with 3cc 1%l lidocaine with 40mg depomedrol 
and 40mg kenalog, tolerated well with no complications, neosporin and bandage 
applied. Injection to Right SI joint as above

Pt will call in 3 days on pain

Has PT starting in 2wks.

## 2020-02-19 NOTE — XMS REPORT
CCD document using C-CDA

                             Created on: 2020



Leilani Ramírez

External Reference #: [id number]

: 1940

Sex: Female



Demographics





                          Address                   [address] [place]  [address]

 

                          Home Phone                [phone]

 

                          Preferred Language        Unknown

 

                          Marital Status            

 

                          Evangelical Affiliation     Unknown

 

                          Race                      White

 

                          Ethnic Group              Not  or 





Author





                          Author                    Leilani Driver D.O.

 

                          Organization              [facility]

 

                          Address                   [address] [place]  [address]



 

                          Phone                     [phone]







Care Team Providers





                    Care Team Member Name Role                Phone

 

                    Akanksha Driver D.O., PP                  Unavailable

 

                          CCM                       Unavailable







Summary Purpose

Interface Exchange



Insurance Providers





             Payer name   Policy type / Coverage type Covered party ID Effective

 Begin Date 

Effective End Date

 

             [employer] [place] [id number]  2018   

  Unknown

 

             [employer] Senior Supplemental Medicare Part B [id number]   [id number]    

 Unknown







Family history





Father



                          Diagnosis                 Age At Onset

 

                          Heart disease             Unknown







Mother



                          Diagnosis                 Age At Onset

 

                          Heart disease             Unknown

 

                          Cancer                    Unknown







Social History





                Social History Element Codes           Description     Effective

 Dates

 

                Tobacco history SNOMED CT: 142671287 Never smoker    2011

 

                Marital status  Unknown         Single          2010

 

                Number of children Unknown         9               2010







Allergies, Adverse Reactions, Alerts





           Substance  Reaction   Codes      Entered Date Inactivated Date Status

 

           _                     Unknown    2019 No Inactive Date Active

 

           * NO KNOWN FOOD ALLERGIES            Unknown    2010 No Inactiv

e Date Active

 

           _                     Unknown    2010 No Inactive Date Active

 

           QUINIDINE-QUININE ANALOGUES hives,     Unknown    2010 No Inact

diamante Date Active







Problems





                Condition       Codes           Effective Dates Condition Status

 

                          Essential hypertension    ICD-9: 401.9

ICD-10: I10               2014                Active

 

                          Palpitations              ICD-9: 785.1

ICD-10: R00.2             10/02/2018                Active

 

                          Stress reaction           ICD-9: 308.9

ICD-10: F43.0             2020                Active

 

                          Mixed hyperlipidemia      ICD-9: 272.4

ICD-10: E78.2             2019                Active

 

                          FLU VACCINE               ICD-9: V04.81

ICD-10: Z23               2012                Active

 

                          Acute serous otitis media, left ear ICD-9: 381.01

ICD-10: H65.02            2019                Active

 

                          Coronary atherosclerosis due to calcified coronary les

ion ICD-9: 414.00

ICD-10: I25.84            2018                Active

 

                          Hypoglycemia, unspecified ICD-9: 251.2

ICD-10: E16.2             2017                Active

 

                          Other fatigue             ICD-9: 780.79

ICD-10: R53.83            2017                Active

 

                          Urinary tract infection, site not specified ICD-9: 599

.0

ICD-10: N39.0             10/02/2018                Active

 

                          Gastro-esophageal reflux disease without esophagitis I

CD-9: 530.81

ICD-10: K21.9             2018                Active

 

                          Epigastric pain           ICD-9: 789.06

ICD-10: R10.13            2018                Active

 

                          Atherosclerotic heart disease of native coronary arter

y without angina pectoris 

ICD-9: 414.00

ICD-10: I25.10            2018                Active

 

                          Generalized anxiety disorder ICD-9: 300.00

ICD-10: F41.1             2018                Active

 

                          Dizziness and giddiness   ICD-9: 780.4

ICD-10: R42               2014                Active

 

                          Occlusion and stenosis of bilateral carotid arteries I

CD-9: 433.10

ICD-10: I65.23            2018                Active

 

                          Cervicalgia               ICD-9: 723.1

ICD-10: M54.2             2018                Active

 

                          Other spondylosis with radiculopathy, cervical region 

ICD-9: 721.0

ICD-10: M47.22            2018                Active

 

                          Cervicocranial syndrome   ICD-9: 723.2

ICD-10: M53.0             2018                Active

 

                          Vertigo of central origin, bilateral ICD-9: 386.2

ICD-10: H81.43            2018                Active

 

                          Benign paroxysmal vertigo, bilateral ICD-9: 386.11

ICD-10: H81.13            2018                Active

 

                          Otalgia, bilateral        ICD-9: 388.70

ICD-10: H92.03            2018                Active

 

                          Left lower quadrant pain  ICD-9: 789.04

ICD-10: R10.32            2017                Active

 

                          Low back pain             ICD-9: 724.2

ICD-10: M54.5             2017                Active

 

                          Radiculopathy, lumbosacral region ICD-9: 724.4

ICD-10: M54.17            2018                Active

 

                          Impaired fasting glucose  ICD-9: 790.29

ICD-10: R73.01            2012                Active

 

                          Other intervertebral disc degeneration, lumbar region 

ICD-9: 722.52

ICD-10: M51.36            2017                Active

 

                          Pain in thoracic spine    ICD-9: 724.1

ICD-10: M54.6             2017                Active

 

                          Mastodynia                ICD-9: 611.71

ICD-10: N64.4             2017                Active

 

                          Acute upper respiratory infection, unspecified ICD-9: 

465.9

ICD-10: J06.9             2017                Active

 

                          Acute mastoiditis without complications, right ear ICD

-9: 383.00

ICD-10: H70.001           2016                Active

 

                          Constipation, unspecified ICD-9: 564.00

ICD-10: K59.00            2016                Active

 

                          Chronic kidney disease, stage 1 ICD-9: 585.1

ICD-10: N18.1             03/15/2016                Active

 

                          History of falling        ICD-9: V15.88

ICD-10: Z91.81            12/15/2015                Active

 

                          Muscle weakness (generalized) ICD-9: 780.79

ICD-10: M62.81            2015                Active

 

                Acute renal failure ICD-9: 584.9    2015      Active

 

                POLYURIA        ICD-9: 788.42   2015      Active

 

                CAD             ICD-9: 414.00   09/10/2015      Active

 

                Hyperglycemia   ICD-9: 790.29   2012      Active

 

                HYPERLIPIDEMIA NEC/NOS ICD-9: 272.4    09/10/2015      Active

 

                ABDOMINAL PAIN  ICD-9: 789.00   10/10/2012      Active

 

                Grieving        ICD-9: 309.0    2015      Active

 

                HYPOGLYCEMIA    ICD-9: 251.2    2012      Active

 

                MALAISE AND FATIGUE ICD-9: 780.79   2015      Active

 

                CERUMEN IMPACTION ICD-9: 380.4    2015      Active

 

                Leg pain        ICD-9: 729.5    2015      Active

 

                SUPERFIC PHLEBITIS-LEG ICD-9: 451.0    2015      Active

 

                SPASM OF MUSCLE ICD-9: 728.85   2015      Active

 

                Thoracic back pain ICD-9: 724.1    2015      Active

 

                Benign positional vertigo ICD-9: 386.11   2015      Active

 

                Suspicious nevus ICD-9: 238.2    2015      Active

 

                EUSTACHIAN TUBE DYSFUNCTION ICD-9: 381.81   2014      Acti

ve

 

                SINUSITIS, ACUTE ICD-9: 461.9    2014      Active

 

                DIZZINESS/VERTIGO ICD-9: 780.4    2014      Active

 

                HYPERTENSION    ICD-9: 401.9    2014      Active

 

                URI, ACUTE      ICD-9: 465.9    10/15/2013      Active

 

                CEPHALGIA       ICD-9: 784.0    2013      Active

 

                OTITIS MEDIA NOS ICD-9: 382.9    2013      Active

 

                Perforation of ear drum ICD-9: 384.20   05/10/2013      Active

 

                Mastalgia       ICD-9: 611.71   2013      Active

 

                DYSPEPSIA       ICD-9: 536.8    10/10/2012      Active

 

                IBS             ICD-9: 564.1    10/10/2012      Active

 

                FLU VACCINE     ICD-9: V04.81   2012      Active

 

                Radiculopathy of leg ICD-9: 724.4    2012      Active

 

                SCIATICA        ICD-9: 724.3    2012      Active

 

                Lumbar degenerative disc disease ICD-9: 722.52   2012     

 Active

 

                Sacroiliac dysfunction ICD-9: 739.4    2012      Active

 

                Hypertension    Unknown         2012      Active

 

                PAIN, LOWER BACK ICD-9: 724.2    2011      Active

 

                SPINAL ENTHESOPATHY ICD-9: 720.1    2011      Active

 

                Hypotension     ICD-9: 458.9    2011      Active

 

                SACROILIITIS NEC ICD-9: 720.2    2011      Active

 

                PHARYNGITIS, ACUTE ICD-9: 462      2010      Active

 

                URINARY TRACT INFECTION ICD-9: 599.0    2010      Active

 

                Heat exhaustion ICD-9: 992.5    2010      Active

 

                Esophagitis     ICD-9: 530.10   2010      Active

 

                Gastritis       ICD-9: 535.50   2010      Active

 

                GERD            ICD-9: 530.81   2010      Active

 

                Hiatal hernia   ICD-9: 553.3    2010      Active

 

                B12 DEFIC ANEMIA NEC ICD-9: 281.1    2010      Active

 

                Anxiety         Unknown         2010      Active

 

                Heart disease   Unknown         2010      Active

 

                Hyperlipidemia  Unknown         2010      Active

 

                ANXIETY STATE NOS ICD-9: 300.00   2010      Active

 

                DEPRESSIVE DISORDER NEC ICD-9: 311      2010      Active







Medications





          Medication Codes     Instructions Start Date Stop Date Status    Fill 

Instructions

 

                    Flonase Allergy Relief 50 mcg/actuation nasal spray,suspensi

on RxNorm: 2501930     2

Spray Nasal every night at bedtime 2020      Inactive     

    

 

           simvastatin 40 mg tablet RxNorm: 948517 1 Tablet(s) PO QD 2019 

02/15/2020 

Active                                   

 

                omeprazole 40 mg capsule,delayed release RxNorm: 830617  1 Capsu

le(s) Oral QD 

2019          Active               

 

                Xanax 0.25 mg tablet RxNorm: 418088  TAKE 1 TABLET BY MOUTH TWIC

E DAILY 

10/29/2019          No Stop Date        Active               

 

                omeprazole 40 mg capsule,delayed release RxNorm: 356346  1 Capsu

le(s) PO QD 

10/07/2019          2019          Inactive             

 

             metoprolol tartrate 25 mg tablet RxNorm: 628554 1 Tablet(s) PO BID 

2019                Active                     

 

                omeprazole 40 mg capsule,delayed release RxNorm: 014330  1 Capsu

le(s) PO QD 

2019          10/06/2019          Inactive             

 

                    Flonase Allergy Relief 50 mcg/actuation nasal spray,suspensi

on RxNorm: 4654676     2

[facility] QHS 2019      Inactive         

 

           simvastatin 40 mg tablet RxNorm: 896533 1 Tablet(s) PO QD 2019 

Inactive                                 

 

           simvastatin 40 mg tablet RxNorm: 165799 1 Tablet(s) PO QD 2019 

Inactive                                 

 

                omeprazole 40 mg capsule,delayed release RxNorm: 461601  1 Capsu

le(s) PO QD 

2019          Inactive             

 

           simvastatin 40 mg tablet RxNorm: 169940 1 Tablet(s) PO QD 2019 

Inactive                                 

 

                omeprazole 40 mg capsule,delayed release RxNorm: 158549  1 Capsu

le(s) PO QD 

2019          Inactive             

 

             Bactrim  mg-160 mg tablet RxNorm: 589530 1 Tablet(s) PO BID 0

3/08/2019   

2019                Inactive                   

 

             Bactrim  mg-160 mg tablet RxNorm: 663936 1 Tablet(s) PO BID 0

3/08/2019   

2019                Inactive                   

 

             Macrobid 100 mg capsule RxNorm: 899684 1 Capsule(s) PO BID 20

19   2019

                          Inactive                   

 

             metoprolol tartrate 25 mg tablet RxNorm: 437841 1 Tablet(s) PO BID 

2019                Inactive                   

 

                Xanax 0.25 mg tablet RxNorm: 686064  TAKE 1 TABLET BY MOUTH TWIC

E DAILY 

2018          10/28/2019          Inactive             

 

           Xanax 0.25 mg tablet RxNorm: 467518 1 Tablet(s) PO BID 2018 

Inactive                                 

 

                    Protonix 40 mg tablet,delayed release RxNorm: 615924      1 

Tablet(s) PO BID for 

stomach--replaces omeprazole 2018      Inactive         

 

             Carafate 1 gram tablet RxNorm: 138790 1 Tablet(s) PO AC & HS 2019                Inactive                   

 

           Xanax 0.25 mg tablet RxNorm: 387346 1 Tablet(s) PO BID 10/30/2018 12/

10/2018 

Inactive                                 

 

             Bactrim  mg-160 mg tablet RxNorm: 834793 1 Tablet(s) PO BID 1

   

10/08/2018                Inactive                   

 

             Bactrim  mg-160 mg tablet RxNorm: 893509 1 Tablet(s) PO BID 1

   

10/03/2018                Inactive                   

 

             Carafate 1 gram tablet RxNorm: 378912 1 Tablet(s) PO AC & HS 09/18/

2018   

10/17/2018                Inactive                   

 

                    Protonix 40 mg tablet,delayed release RxNorm: 814895      1 

Tablet(s) PO BID for 

stomach--replaces omeprazole 2018      Inactive         

 

                    Protonix 40 mg tablet,delayed release RxNorm: 275263      1 

Tablet(s) PO BID for 

stomach--replaces omeprazole 2018      Inactive         

 

                    fluticasone 50 mcg/actuation nasal spray,suspension RxNorm: 

1621423     2 Spray 

NASAL QD to each nostril 2018      Inactive         

 

             Nasonex 50 mcg/actuation Spray RxNorm: 1147117 2 [facility] 2018                Inactive                   

 

                omeprazole 40 mg capsule,delayed release RxNorm: 460567  1 Capsu

le(s) PO QD 

2018          08/15/2018          Inactive             

 

                    simvastatin 40 mg tablet RxNorm: 727855      1 Tablet(s) PO 

QD TAKE 1 TABLET EVERY 

DAY             2018      Inactive         

 

             metoprolol tartrate 25 mg tablet RxNorm: 374748 1/2 Tablet(s) PO QD

 2018                Inactive                   

 

                simvastatin 40 mg tablet RxNorm: 156886  Tablet(s) TAKE 1 TABLET

 EVERY DAY 

2017          Inactive             

 

             metoprolol tartrate 25 mg tablet RxNorm: 248305 1/2 Tablet(s) PO QD

 2017                Inactive                   

 

                    Flonase 50 mcg/actuation nasal spray,suspension RxNorm: 1797

933     2 [facility] 

BID             2017      Inactive         

 

                omeprazole 40 mg capsule,delayed release RxNorm: 943231  1 Capsu

le(s) PO QD 

2017          Inactive             

 

             metoprolol tartrate 25 mg tablet RxNorm: 949792 1/2 Tablet(s) PO QD

 04/10/2017   

2017                Inactive                   

 

                    ciprofloxacin 0.2 % ear drops in a dropperette RxNorm: 55520

6      4 Drop(s) OTIC TID

for 1 week      2016      Inactive         

 

           cefdinir 300 mg capsule RxNorm: 936414 2 Capsule(s) PO QD 2016 

Inactive                                 

 

                    omeprazole 40 mg capsule,delayed release RxNorm: 582564     

 TAKE 1 CAPSULE EVERY DAY

                2016      Inactive         

 

             simvastatin 40 mg tablet RxNorm: 971198 TAKE 1 TABLET EVERY DAY 2017                Inactive                   

 

                    Flonase 50 mcg/actuation nasal spray,suspension RxNorm: 1797

933     2 [facility] 

BID             2015      Inactive         

 

             cefuroxime axetil 250 mg tablet RxNorm: 712095 1 Tablet(s) PO BID 0

2015                Inactive                   

 

             cefuroxime axetil 250 mg tablet RxNorm: 386411 1 Tablet(s) PO BID 0

2015                Inactive                   

 

                omeprazole 40 mg capsule,delayed release RxNorm: 468095  1 Capsu

le(s) PO QD 

2015          Inactive             

 

           meloxicam 7.5 mg tablet RxNorm: 670581 1 Tablet(s) PO QD 2015 0

2015 

Inactive                                 

 

                loratadine 10 mg tablet RxNorm: 769188  1 Tablet(s) PO QAM for a

llergies 

2014          Inactive             

 

                    Flonase 50 mcg/actuation nasal spray,suspension RxNorm: 8963

23      2 [facility] BID

                2014      12/15/2015      Inactive         

 

           prednisone 20 mg tablet RxNorm: 264746 2 Tablet(s) PO BID 2014 

Inactive                                 

 

             Dyazide 37.5 mg-25 mg capsule RxNorm: 950122 1 Capsule(s) PO QAM 2014                Inactive                   

 

           Ceftin 500 mg tablet RxNorm: 762212 1 Tablet(s) PO BID 2014 

Inactive                                 

 

           Ceftin 500 mg tablet RxNorm: 479303 1 Tablet(s) PO BID 2014 

Inactive                                 

 

                    simvastatin 40 mg tablet RxNorm: 337427      Tablet(s) PO TA

KE ONE TABLET BY MOUTH 

EVERY DAY       2014      Inactive         

 

                    metoprolol tartrate 25 mg tablet RxNorm: 813739      Tablet(

s) PO TAKE ONE-HALF 

TABLET BY MOUTH EVERY DAY 2014      Inactive         

 

           Ceftin 500 mg tablet RxNorm: 638760 1 Tablet(s) PO BID 10/15/2013 10/

 

Inactive                                 

 

                loratadine 10 mg tablet RxNorm: 252810  1 Tablet(s) PO QAM for a

llergies 

2013          Inactive             

 

                    omeprazole 20 mg capsule,delayed release RxNorm: 873366     

 Capsule(s) PO TAKE ONE 

CAPSULE BY MOUTH TWICE DAILY 2013      Inactive         

 

                omeprazole 20 mg capsule,delayed release RxNorm: 510288  1 Capsu

le(s) PO BID 

2013          Inactive             

 

             Augmentin 875 mg-125 mg tablet RxNorm: 508569 1 Tablet(s) PO Q12H 0

5/10/2013   

2013                Inactive                   

 

             Floxin Otic Drops 1 bottle Drops RxNorm:      5 Drop(s) OTIC BID 05

/10/2013   

2013                Inactive                   

 

                    metoprolol tartrate 25 mg tablet RxNorm: 958226      Tablet(

s) PO TAKE ONE-HALF 

TABLET BY MOUTH EVERY DAY 2013      Inactive         

 

                    simvastatin 40 mg tablet RxNorm: 193470      Tablet(s) PO TA

KE ONE TABLET BY MOUTH 

EVERY DAY       2013      Inactive         

 

                lancets         RxNorm:         Misc Miscellaneous USE ONE TO CH

YOGI GLUCOSE EVERY DAY 

2013          Inactive             

 

             Carafate 1 gram tablet RxNorm: 179661 1 Tablet(s) PO AC & HS 2015                Inactive                   

 

           Flagyl 500 mg tablet RxNorm: 164678 1 Tablet(s) PO TID 10/10/2012 10/

 

Inactive                                 

 

             Carafate 1 gram tablet RxNorm: 133073 1 Tablet(s) PO AC & HS 10/10/

2012   

2012                Inactive                   

 

          One Touch Test strips RxNorm:   Miscellaneous QD 2012

 Inactive  

one touch ultra mini test strips

 

           Protonix 40 mg Tab RxNorm: 802583 1 Tablet(s) PO QD 2012 

Inactive                                 

 

           Protonix 40 mg Tab RxNorm: 304581 1 Tablet(s) PO QD 2012 10/09/

2012 

Inactive                                 

 

           prednisone 20 mg Tab RxNorm: 876210 1 Tablet(s) PO BID 2012 

Inactive                                 

 

             Flexeril 5 mg Tab RxNorm: 200142 1 Tablet(s) PO QHS for spasm 2012                Inactive                   

 

             Flexeril 5 mg Tab RxNorm: 269681 1 Tablet(s) PO QHS for spasm 2012                Inactive                   

 

                amlodipine 2.5 mg Tab RxNorm: 072695  1/2 Tablet(s) PO QD replac

es 5mg dose 

2012          Inactive             

 

           simvastatin 40 mg tablet RxNorm: 454391 1 Tablet(s) PO QD 2012 

Inactive                                 

 

             metoprolol tartrate 25 mg tablet RxNorm: 893497 1/2 Tablet(s) PO QD

 2012                Inactive                   

 

                omeprazole 20 mg capsule,delayed release RxNorm: 788844  1 Capsu

le(s) PO BID 

2012          Inactive             

 

           cefdinir 300 mg Cap RxNorm: 985388 2 Capsule(s) PO QD 2011 

Inactive                                 

 

           simvastatin 40 mg Tab RxNorm: 953889 1 Tablet(s) PO QD 2011 

Inactive                                 

 

             metoprolol tartrate 25 mg Tab RxNorm: 508767 1/2 Tablet(s) PO QD 10

/   

2012                Inactive                   

 

             metoprolol tartrate 25 mg Tab RxNorm: 840572 1/2 Tablet(s) PO QD 10

/   

10/24/2011                Inactive                   

 

           meclizine 25 mg Tab RxNorm: 8009831 1 Tablet(s) PO QHS 2011 

Inactive                                for dizziness

 

           Ceftin 500 mg Tab RxNorm: 225754 1 Tablet(s) PO BID 2011 

Inactive                                 

 

                omeprazole 20 mg Cap, Delayed Release RxNorm: 987029  1 Capsule(

s) PO BID 

2011          10/24/2011          Inactive             

 

           simvastatin 40 mg Tab RxNorm: 055030 1 Tablet(s) PO QD 2011 

Inactive                                 

 

           simvastatin 40 mg Tab RxNorm: 343296 1 Tablet(s) PO QD 2011 

Inactive                                 

 

           simvastatin 40 mg Tab RxNorm: 329842 1 Tablet(s) PO QD 2010 

Inactive                                 

 

             Tessalon Perles 100 mg Cap RxNorm: 075636 1 Capsule(s) PO Q6-8H 2010                Inactive                   

 

           cefdinir 300 mg Cap RxNorm: 674994 1 Capsule(s) PO BID 2010 

Inactive                                 

 

           Lexapro 10 mg Tab RxNorm: 141086 1 Tablet(s) PO QD 2010

010 

Inactive                                 

 

           Cipro 250 mg Tab RxNorm: 401080 1 Tablet(s) PO BID 2010 10/04/2

010 

Inactive                                 

 

             Wellbutrin  mg 24 hr Tab RxNorm: 655895 1 Tablet(s) PO QAM 2010                Inactive                   

 

          Fish Oil 1,000 mg Cap RxNorm:   1 Capsule(s) PO QD No Start Date      

     Active     

 

                Tylenol Extra Strength 500 mg tablet RxNorm: 134399  1/2 Tablet(

s) PO as needed 

No Start Date                           Active               

 

                nitroglycerin 0.4 mg sublingual tablet RxNorm: 460995  Tablet(s)

 SL as needed No 

Start Date                              Active               

 

                    Calcium with Vitamin D 600 mg (1,500 mg)-400 unit tablet RxN

orm: 138023      1 

Tablet(s) PO QD No Start Date                   Active           

 

           Multivitamin & Mineral Formula Tab RxNorm:    1 Tablet(s) PO QD No St

art Date            

Active                                   

 

          vitamin B complex capsule RxNorm:   1 Capsule(s) PO QD No Start Date  

         Active     

 

          Aspirin 81 mg Tab RxNorm: 153436 1 Tablet(s) PO QD No Start Date      

     Active     

 

                    isosorbide mononitrate ER 30 mg tablet,extended release 24 h

r RxNorm: 693934      1 

Tablet(s) PO QHS No Start Date                   Active           

 

           Vitamin D 1,000 unit Cap RxNorm: 918545 1 Capsule(s) PO QD No Start D

ate            

Active                                   

 

          Co Q-10 oral RxNorm: 15447 oral      No Start Date           Active   

  

 

             metoprolol tartrate 25 mg Tab RxNorm: 266388 1/2 Tablet(s) PO QD No

 Start Date 

10/23/2011                Inactive                   

 

             Nasonex 50 mcg/actuation Spray RxNorm: 9261947 2 Spray NASAL No Sta

rt Date 

2018                Inactive                   

 

           Vitamin B12 1000mcg Tablet RxNorm:    1 Tablet(s) PO QD No Start Date

 2015 

Inactive                                 

 

           amlodipine 5 mg Tab RxNorm: 976358 1 Tablet(s) PO QD No Start Date  

Inactive                                 

 

             simvastatin 40 mg tablet RxNorm: 463359 1 Tablet(s) PO QD No Start 

Date 

2019                Inactive                   

 

                omeprazole 20 mg capsule,delayed release RxNorm: 604961  1 Capsu

le(s) PO BID No 

Start Date          2013          Inactive             

 

                omeprazole 40 mg capsule,delayed release RxNorm: 240270  1 Capsu

le(s) PO QD No 

Start Date          2019          Inactive             

 

           Nexium 40 mg Cap RxNorm: 382125 1 Capsule(s) PO QD No Start Date  

Inactive                                 

 

             Stool Softener 100 mg Tab RxNorm: 0992060 2 Tablet(s) PO BID No Sta

rt Date 

2012                Inactive                   

 

                    Calcium with Vitamin D 600 mg (1,500 mg)-400 unit Tab RxNorm

: 262634      1 Tablet(s)

PO QD           No Start Date   2015      Inactive         

 

             iron 325 mg (65 mg iron) Tab RxNorm: 230493 1 Tablet(s) PO QD No St

art Date 

2015                Inactive                   

 

                Flonase 50 mcg/actuation Nasal Spray RxNorm: 898855  2 [place] ROZ

AL BID No Start 

Date                2014          Inactive             

 

                    fluticasone 50 mcg/actuation nasal spray,suspension RxNorm: 

7186489     2 Spray 

NASAL QD to each nostril No Start Date   2018      Inactive         

 

             Nasonex 50 mcg/actuation Spray RxNorm: 0923948 2 Spray NASAL QD No 

Start Date 

2018                Inactive                   

 

                    hydralazine 50 mg tablet RxNorm: 619464      1 Tablet(s) PO 

as needed for BP over 

160/90          No Start Date   2018      Inactive         

 

             Vimovo 500 mg-20 mg 12 hr Tab RxNorm: 088836 1 Tablet(s) PO BID No 

Start Date 

2011                Inactive                   

 

             Tylenol PM 25 mg-500 mg/15 mL Oral Soln RxNorm: 8109542 1 PO QPM   

  No Start Date 

2015                Inactive                   

 

          Iron (Ferrous Sulfate) Oral RxNorm:   Oral      No Start Date 20

12 Inactive   

 

             Reglan 10 mg Tab RxNorm: 169314 1 Tablet(s) PO TID before meals No 

Start Date 

2011                Inactive                   

 

           Xanax 1 mg Tab RxNorm: 141144 1/2 Tablet(s) PO QD No Start Date  

Inactive                                 

 

             amlodipine 10 mg tablet RxNorm: 268696 1 Tablet(s) PO QD No Start D

ate 

2014                Inactive                   

 

          Iron (dried) Oral RxNorm:   Oral      No Start Date 2012 Inactiv

e   

 

                metoprolol tartrate 50 mg tablet RxNorm: 202712  1 Tablet(s) PO 

BID No Start Date

                    12/10/2018          Inactive             

 

           Xanax 0.25 mg tablet RxNorm: 406999 1 Tablet(s) PO BID No Start Date 

10/29/2018 

Inactive                                 

 

                lancets         RxNorm:         Miscellaneous check blood sugar 

at least once daily No Start 

Date                2013          Inactive             

 

           simvastatin 40 mg Tab RxNorm: 912469 1 Tablet(s) PO QD No Start Date 

2010 

Inactive                                 

 

                    isosorbide mononitrate ER 30 mg tablet,extended release 24 h

r RxNorm: 645941      1 

Tablet(s) PO QHS No Start Date   2019      Inactive         

 

             amlodipine 2.5 mg tablet RxNorm: 079036 1 Tablet(s) PO QD No Start 

Date 

2014                Inactive                   

 

             sucralfate 1 gram tablet RxNorm: 782450 1 Tablet(s) PO QID No Start

 Date 

2019                Inactive                   

 

          Fish Oil Oral RxNorm:   Oral      No Start Date 2012 Inactive   

 

          Multiple Vitamin Oral RxNorm:   Oral      No Start Date 2012 [facility]

ctive   

 

                metoprolol tartrate 25 mg tablet RxNorm: 092219  1/2 Tablet(s) P

O QD No Start 

Date                2017          Inactive             

 

                metoprolol tartrate 50 mg tablet RxNorm: 104054  1/2 Tablet(s) P

O BID No Start 

Date                2019          Inactive             

 

                    Flonase Allergy Relief 50 mcg/actuation nasal spray,suspensi

on RxNorm: 2703280     2

[place] NASAL QHS No Start Date   2019      Inactive         







Medication Administered

No Medication Administered data



Immunizations





                Vaccine         Codes           Date            Status

 

                Influenza       CVX: 135        10/22/2019      Complete

 

                Influenza       CVX: 135        10/22/2019      Complete

 

                Influenza       CVX: 135        2018      Complete

 

                Influenza       CVX: 135        10/06/2017      Complete

 

                Influenza       CVX: 135        10/07/2016      Complete

 

                Influenza       CVX: 135        10/16/2015       

 

                Influenza       CVX: 141        2013       

 

                Influenza (Adult) CVX: 141        10/06/2010       







Results





          Observation Observation Code Item      Item Code Result    Date      S

ervice Location

 

          GFR CALC  9018378   GFR Non Afr Amr           52 mL/min 10/11/2019 Unk

nown

 

          GFR CALC  0882070   GFR Afr Amr           >60 mL/min 10/11/2019 Unknow

n

 

          COMPREHENSIVE METABOLIC 92591     AST                 17 U/L    10/11/

2019 Unknown

 

          COMPREHENSIVE METABOLIC 93683     ALT                 11 U/L    10/11/

2019 Unknown

 

          COMPREHENSIVE METABOLIC 77464     BUN                 17 mg/dL  10/11/

2019 Unknown

 

          COMPREHENSIVE METABOLIC 57028     ALBUMIN             3.9 g/dL  10/11/

2019 Unknown

 

          COMPREHENSIVE METABOLIC 33526     CHLORIDE            108 mmol/L 10/11

/2019 Unknown

 

          COMPREHENSIVE METABOLIC 72980     Bili Total           0.5 mg/dL 10/11

/2019 Unknown

 

          COMPREHENSIVE METABOLIC 29323     ALK PHOS            72 U/L    10/11/

2019 Unknown

 

          COMPREHENSIVE METABOLIC 56520     SODIUM              142 mmol/L 10/11

/2019 Unknown

 

          COMPREHENSIVE METABOLIC 01369     CREATININE           1.02 mg/dL 10/1

2019 Unknown

 

          COMPREHENSIVE METABOLIC 27861     CALCIUM             9.3 mg/dL 10/11/

2019 Unknown

 

          COMPREHENSIVE METABOLIC 96564     POTASSIUM           4.0 mmol/L 10/11

/2019 Unknown

 

          COMPREHENSIVE METABOLIC 19076     Total Protein           6.2 g/dL  10

/2019 Unknown

 

          COMPREHENSIVE METABOLIC 89564     Glucose             93 mg/dL  10/11/

2019 Unknown

 

          COMPREHENSIVE METABOLIC 43917     Bicarbonate           27 mmol/L 10/1

2019 Unknown

 

          COMPREHENSIVE METABOLIC 81592     AGAP                7 mmol/L  10/11/

2019 Unknown

 

          GFR CALC  9286381   GFR Non Afr Amr           48 mL/min 06/10/2019 Unk

nown

 

          GFR CALC  2778316   GFR Afr Amr           59 mL/min 06/10/2019 Unknown

 

          THYROID STIMULATING HORMONE 68681     TSH                 1.936 uIU/mL

 06/10/2019 Unknown

 

          COMPREHENSIVE METABOLIC 25683     AST                 18 U/L    06/10/

2019 Unknown

 

          COMPREHENSIVE METABOLIC 76300     ALT                 11 U/L    06/10/

2019 Unknown

 

          COMPREHENSIVE METABOLIC 92261     BUN                 18 mg/dL  06/10/

2019 Unknown

 

          COMPREHENSIVE METABOLIC 21754     ALBUMIN             4.2 g/dL  06/10/

2019 Unknown

 

          COMPREHENSIVE METABOLIC 78257     CHLORIDE            109 mmol/L 06/10

/2019 Unknown

 

          COMPREHENSIVE METABOLIC 18343     Bili Total           0.4 mg/dL 06/10

/2019 Unknown

 

          COMPREHENSIVE METABOLIC 83154     ALK PHOS            64 U/L    06/10/

2019 Unknown

 

          COMPREHENSIVE METABOLIC 61302     SODIUM              142 mmol/L 06/10

/2019 Unknown

 

          COMPREHENSIVE METABOLIC 04537     CREATININE           1.09 mg/dL  Unknown

 

          COMPREHENSIVE METABOLIC 18070     CALCIUM             9.3 mg/dL 06/10/

2019 Unknown

 

          COMPREHENSIVE METABOLIC 61918     POTASSIUM           4.7 mmol/L 06/10

/2019 Unknown

 

          COMPREHENSIVE METABOLIC 79579     Total Protein           6.3 g/dL  06

/10/2019 Unknown

 

          COMPREHENSIVE METABOLIC 77184     Glucose             84 mg/dL  06/10/

2019 Unknown

 

          COMPREHENSIVE METABOLIC 21002     Bicarbonate           25 mmol/L  Unknown

 

          COMPREHENSIVE METABOLIC 68175     AGAP                8 mmol/L  06/10/

2019 Unknown

 

          COMPLETE BLOOD COUNT [id number]   WBC                 7.8 10e9/L 06/10/20

19 Unknown

 

          COMPLETE BLOOD COUNT [id number]   RBC                 4.04 10e12/L 06/10/

2019 Unknown

 

          COMPLETE BLOOD COUNT [id number]   HEMOGLOBIN           12.2 g/dL 06/10/20

19 Unknown

 

          COMPLETE BLOOD COUNT [id number]   HEMATOCRIT           38.6 %    06/10/20

19 Unknown

 

          COMPLETE BLOOD COUNT [id number]   MCV                 95.5 fL   06/10/201

9 Unknown

 

          COMPLETE BLOOD COUNT [id number]   MCH                 30.2 pg   06/10/201

9 Unknown

 

          COMPLETE BLOOD COUNT [id number]   MCHC                31.6 g/dL 06/10/201

9 Unknown

 

          COMPLETE BLOOD COUNT [id number]   PLATELET COUNT           215 10e9/L 06/

10/2019 Unknown

 

          COMPLETE BLOOD COUNT [id number]   Mean Plt Volume           11.2 fL   06/

10/2019 Unknown

 

          COMPLETE BLOOD COUNT [id number]   Neut Auto           45.7 %    06/10/201

9 Unknown

 

          COMPLETE BLOOD COUNT [id number]   Lymph Auto           42.1 %    06/10/20

19 Unknown

 

          COMPLETE BLOOD COUNT [id number]   Mono Auto           9.2 %     06/10/201

9 Unknown

 

          COMPLETE BLOOD COUNT [id number]   RDW                 13.4 %    06/10/201

9 Unknown

 

          COMPLETE BLOOD COUNT [id number]   Eos Auto            2.7 %     06/10/201

9 Unknown

 

          COMPLETE BLOOD COUNT [id number]   Baso Auto           0.3 %     06/10/201

9 Unknown

 

          COMPLETE BLOOD COUNT [id number]   Neutrophil Abs           3.56 10e9/L 06

/10/2019 Unknown

 

          COMPLETE BLOOD COUNT [id number]   Lymphocyte Abs           3.28 10e9/L 06

/10/2019 Unknown

 

          COMPLETE BLOOD COUNT [id number]   Monocyte Abs           0.72 10e9/L  Unknown

 

          COMPLETE BLOOD COUNT [id number]   Eosinophil Abs           0.21 10e9/L 06

/10/2019 Unknown

 

          COMPLETE BLOOD COUNT [id number]   RDW-SD              44.9 fL   06/10/201

9 Unknown

 

          COMPLETE BLOOD COUNT [id number]   Basophil Abs           0.02 10e9/L  Unknown

 

          COMPLETE BLOOD COUNT [id number]   WBC                 5.2 10e9/L 20

18 Unknown

 

          COMPLETE BLOOD COUNT [id number]   RBC                 4.21 10e12/L 2018 Unknown

 

          COMPLETE BLOOD COUNT [id number]   HEMOGLOBIN           12.8 g/dL 20

18 Unknown

 

          COMPLETE BLOOD COUNT [id number]   HEMATOCRIT           39.0 %    20

18 Unknown

 

          COMPLETE BLOOD COUNT [id number]   MCV                 92.6 fL   

8 Unknown

 

          COMPLETE BLOOD COUNT [id number]   MCH                 30.4 pg   

8 Unknown

 

          COMPLETE BLOOD COUNT [id number]   MCHC                32.8 g/dL 

8 Unknown

 

          COMPLETE BLOOD COUNT [id number]   PLATELET COUNT           202 10e9/L  Unknown

 

          COMPLETE BLOOD COUNT [id number]   Mean Plt Volume           10.7 fL    Unknown

 

          COMPLETE BLOOD COUNT [id number]   Neut Auto           40.9 %    

8 Unknown

 

          COMPLETE BLOOD COUNT [id number]   Lymph Auto           46.3 %    20

18 Unknown

 

          COMPLETE BLOOD COUNT [id number]   Mono Auto           9.3 %     

8 Unknown

 

          COMPLETE BLOOD COUNT [id number]   RDW                 13.7 %    

8 Unknown

 

          COMPLETE BLOOD COUNT [id number]   Eos Auto            2.9 %     

8 Unknown

 

          COMPLETE BLOOD COUNT [id number]   Baso Auto           0.6 %     

8 Unknown

 

          COMPLETE BLOOD COUNT [id number]   Neutrophil Abs           2.13 10e9/L  Unknown

 

          COMPLETE BLOOD COUNT [id number]   Lymphocyte Abs           2.41 10e9/L  Unknown

 

          COMPLETE BLOOD COUNT [id number]   Monocyte Abs           0.48 10e9/L  Unknown

 

          COMPLETE BLOOD COUNT [id number]   Eosinophil Abs           0.15 10e9/L  Unknown

 

          COMPLETE BLOOD COUNT [id number]   RDW-SD              45.2 fL   

8 Unknown

 

          COMPLETE BLOOD COUNT [id number]   Basophil Abs           0.03 10e9/L  Unknown

 

          [place] [address]     Glucose             118 mg/dL  Unknown

 

          [place] [address]     CREATININE           1.01 mg/dL  Unknown

 

          [place] [address]     BUN                 14 mg/dL   Unknown

 

          [place] [address]     SODIUM              141 mmol/L  Unknown

 

          [place] [address]     POTASSIUM           4.0 mmol/L  Unknown

 

          [place] [address]     CHLORIDE            108 mmol/L  Unknown

 

          [place] [address]     Bicarbonate           25 mmol/L  Unknown

 

          [place] [address]     AGAP                8 mmol/L   Unknown

 

          [place] [address]     CALCIUM             9.6 mg/dL  Unknown

 

          FREE T4   58100     T4 Free             1.23 ng/dL 2018 Unknown

 

          GFR CALC  0299479   GFR Non Afr Amr           53 mL/min 2018 Unk

nown

 

          GFR CALC  3917826   GFR Afr Amr           >60 mL/min 2018 Unknow

n

 

          THYROID STIMULATING HORMONE 70360     TSH                 2.124 uIU/mL

 2018 Unknown

 

          LIPID GROUP 22912     Cholesterol           152 mg/dL 2018 Unkno

wn

 

          LIPID GROUP 76653     Triglyceride           151 mg/dL 2018 Unkn

own

 

          LIPID GROUP 42593     HDL CHOLESTEROL           47 mg/dL  2018 U

nknown

 

          LIPID GROUP 31473     Chol/HDL Ratio           3.23 ratio 2018 U

nknown

 

          LIPID GROUP 54642     NON-HDL Chol           105 mg/dL 2018 Unkn

own

 

          LIPID GROUP 36413     LDL Cholesterol           75 mg/dL  2018 U

nknown

 

          ASSAY OF TROPONIN QUANT 52893     Troponin-I           <0.30 ng/mL  Unknown

 

          COMPREHENSIVE METABOLIC 15936     AST                 20 U/L    2018 Unknown

 

          COMPREHENSIVE METABOLIC 60293     ALT                 14 U/L    2018 Unknown

 

          COMPREHENSIVE METABOLIC 20043     BUN                 19 mg/dL  2018 Unknown

 

          COMPREHENSIVE METABOLIC 93630     ALBUMIN             4.2 g/dL  2018 Unknown

 

          COMPREHENSIVE METABOLIC 20776     CHLORIDE            102 mmol/L  Unknown

 

          COMPREHENSIVE METABOLIC 69698     Bili Total           0.4 mg/dL  Unknown

 

          COMPREHENSIVE METABOLIC 04650     ALK PHOS            66 U/L    2018 Unknown

 

          COMPREHENSIVE METABOLIC 29798     SODIUM              135 mmol/L  Unknown

 

          COMPREHENSIVE METABOLIC 50306     CREATININE           1.01 mg/dL  Unknown

 

          COMPREHENSIVE METABOLIC 37472     CALCIUM             9.3 mg/dL 2018 Unknown

 

          COMPREHENSIVE METABOLIC 59753     POTASSIUM           4.8 mmol/L  Unknown

 

          COMPREHENSIVE METABOLIC 85989     Total Protein           7.0 g/dL   Unknown

 

          COMPREHENSIVE METABOLIC 32352     Glucose             91 mg/dL  2018 Unknown

 

          COMPREHENSIVE METABOLIC 66713     Bicarbonate           23 mmol/L  Unknown

 

          COMPREHENSIVE METABOLIC 46282     AGAP                10 mmol/L 2018 Unknown

 

          COMPLETE BLOOD COUNT [id number]   WBC                 7.5 10e9/L 20

18 Unknown

 

          COMPLETE BLOOD COUNT [id number]   RBC                 4.13 10e12/L 2018 Unknown

 

          COMPLETE BLOOD COUNT [id number]   HEMOGLOBIN           12.6 g/dL 20

18 Unknown

 

          COMPLETE BLOOD COUNT [id number]   HEMATOCRIT           38.4 %    20

18 Unknown

 

          COMPLETE BLOOD COUNT [id number]   MCV                 93.0 fL   

8 Unknown

 

          COMPLETE BLOOD COUNT [id number]   MCH                 30.5 pg   

8 Unknown

 

          COMPLETE BLOOD COUNT [id number]   MCHC                32.8 g/dL 

8 Unknown

 

          COMPLETE BLOOD COUNT [id number]   PLATELET COUNT           204 10e9/L  Unknown

 

          COMPLETE BLOOD COUNT [id number]   Mean Plt Volume           10.9 fL    Unknown

 

          COMPLETE BLOOD COUNT [id number]   Neut Auto           43.1 %    

8 Unknown

 

          COMPLETE BLOOD COUNT [id number]   Lymph Auto           45.0 %    20

18 Unknown

 

          COMPLETE BLOOD COUNT [id number]   Mono Auto           9.2 %     

8 Unknown

 

          COMPLETE BLOOD COUNT [id number]   RDW                 13.4 %    

8 Unknown

 

          COMPLETE BLOOD COUNT [id number]   Eos Auto            2.3 %     

8 Unknown

 

          COMPLETE BLOOD COUNT [id number]   Baso Auto           0.4 %     

8 Unknown

 

          COMPLETE BLOOD COUNT [id number]   Neutrophil Abs           3.23 10e9/L  Unknown

 

          COMPLETE BLOOD COUNT [id number]   Lymphocyte Abs           3.38 10e9/L  Unknown

 

          COMPLETE BLOOD COUNT [id number]   Monocyte Abs           0.69 10e9/L  Unknown

 

          COMPLETE BLOOD COUNT [id number]   Eosinophil Abs           0.17 10e9/L  Unknown

 

          COMPLETE BLOOD COUNT [id number]   RDW-SD              44.4 fL   

8 Unknown

 

          COMPLETE BLOOD COUNT [id number]   Basophil Abs           0.03 10e9/L  Unknown

 

          GFR CALC  0360395   GFR Non Afr Amr           53 mL/min 2018 Unk

nown

 

          GFR CALC  1919689   GFR Afr Amr           >60 mL/min 2018 Unknow

n

 

          GLYCOSYLATED HEMOGLOBIN TEST 77459     Hgb A1c   95128-6   5.4 %     0

2018 Unknown

 

          MEAN GLUC 6810975   Calc Mean Gluc           108 mg/dL 2018 Unkn

own

 

          MEAN GLUC 1945665   Calc Mean Gluc           114 mg/dL 2017 Unkn

own

 

          LIPID GROUP 29045     Cholesterol           146 mg/dL 2017 Unkno

wn

 

          LIPID GROUP 40760     Triglyceride           119 mg/dL 2017 Unkn

own

 

          LIPID GROUP 70142     HDL CHOLESTEROL           47 mg/dL  2017 U

nknown

 

          LIPID GROUP 55401     Chol/HDL Ratio           3.11 ratio 2017 U

nknown

 

          LIPID GROUP 32710     NON-HDL Chol           99 mg/dL  2017 Unkn

own

 

          LIPID GROUP 78558     LDL Cholesterol           75 mg/dL  2017 U

nknown

 

          GLYCOSYLATED HEMOGLOBIN TEST 63385     Hgb A1c   51501-9   5.6 %     0

2017 Unknown

 

          COMPREHENSIVE METABOLIC 27682     AST                 22 U/L    2017 Unknown

 

          COMPREHENSIVE METABOLIC 40380     ALT                 12 U/L    2017 Unknown

 

          COMPREHENSIVE METABOLIC 91761     BUN                 17 mg/dL  2017 Unknown

 

          COMPREHENSIVE METABOLIC 61051     ALBUMIN             4.0 g/dL  2017 Unknown

 

          COMPREHENSIVE METABOLIC 21659     CHLORIDE            110 mmol/L  Unknown

 

          COMPREHENSIVE METABOLIC 13832     Bili Total           0.4 mg/dL  Unknown

 

          COMPREHENSIVE METABOLIC 53621     ALK PHOS            63 U/L    2017 Unknown

 

          COMPREHENSIVE METABOLIC 13671     SODIUM              140 mmol/L  Unknown

 

          COMPREHENSIVE METABOLIC 22335     CREATININE           1.05 mg/dL  Unknown

 

          COMPREHENSIVE METABOLIC 68287     CALCIUM             9.2 mg/dL 2017 Unknown

 

          COMPREHENSIVE METABOLIC 14680     POTASSIUM           4.2 mmol/L  Unknown

 

          COMPREHENSIVE METABOLIC 80134     Total Protein           6.2 g/dL   Unknown

 

          COMPREHENSIVE METABOLIC 10225     Glucose             87 mg/dL  2017 Unknown

 

          COMPREHENSIVE METABOLIC 53859     Bicarbonate           24 mmol/L  Unknown

 

          COMPREHENSIVE METABOLIC 20736     AGAP                6 mmol/L  2017 Unknown

 

          GFR CALC  1395509   GFR Non Afr Amr           51 mL/min 2017 Unk

nown

 

          GFR CALC  6717867   GFR Afr Amr           >60 mL/min 2017 Unknow

n

 

          COMPLETE BLOOD COUNT [id number]   WBC                 6.7 10e9/L 20

17 Unknown

 

          COMPLETE BLOOD COUNT [id number]   RBC                 4.04 10e12/L 2017 Unknown

 

          COMPLETE BLOOD COUNT [id number]   HEMOGLOBIN           12.1 g/dL 20

17 Unknown

 

          COMPLETE BLOOD COUNT [id number]   HEMATOCRIT           38.0 %    20

17 Unknown

 

          COMPLETE BLOOD COUNT [id number]   MCV                 94.1 fL   

7 Unknown

 

          COMPLETE BLOOD COUNT [id number]   MCH                 30.0 pg   

7 Unknown

 

          COMPLETE BLOOD COUNT [id number]   MCHC                31.8 g/dL 

7 Unknown

 

          COMPLETE BLOOD COUNT [id number]   PLATELET COUNT           206 10e9/L  Unknown

 

          COMPLETE BLOOD COUNT [id number]   Mean Plt Volume           11.3 fL    Unknown

 

          COMPLETE BLOOD COUNT [id number]   Neut Auto           35.8 %    

7 Unknown

 

          COMPLETE BLOOD COUNT [id number]   Lymph Auto           51.6 %    20

17 Unknown

 

          COMPLETE BLOOD COUNT [id number]   Mono Auto           8.8 %     

7 Unknown

 

          COMPLETE BLOOD COUNT [id number]   RDW                 13.5 %    

7 Unknown

 

          COMPLETE BLOOD COUNT [id number]   Eos Auto            3.4 %     

7 Unknown

 

          COMPLETE BLOOD COUNT [id number]   Baso Auto           0.4 %     

7 Unknown

 

          COMPLETE BLOOD COUNT [id number]   Neutrophil Abs           2.40 10e9/L  Unknown

 

          COMPLETE BLOOD COUNT [id number]   Lymphocyte Abs           3.46 10e9/L  Unknown

 

          COMPLETE BLOOD COUNT [id number]   Monocyte Abs           0.59 10e9/L  Unknown

 

          COMPLETE BLOOD COUNT [id number]   Eosinophil Abs           0.23 10e9/L  Unknown

 

          COMPLETE BLOOD COUNT [id number]   RDW-SD              45.3 fL   

7 Unknown

 

          COMPLETE BLOOD COUNT [id number]   Basophil Abs           0.03 10e9/L  Unknown

 

          THYROID STIMULATING HORMONE 30798     TSH                 1.981 uIU/mL

 2017 Unknown

 

          COMPLETE BLOOD COUNT [id number]   WBC                 6.0 10e9/L 20

17 Unknown

 

          COMPLETE BLOOD COUNT [id number]   RBC                 4.29 10e12/L 2017 Unknown

 

          COMPLETE BLOOD COUNT [id number]   HEMOGLOBIN           12.9 g/dL 20

17 Unknown

 

          COMPLETE BLOOD COUNT [id number]   HEMATOCRIT           38.4 %    20

17 Unknown

 

          COMPLETE BLOOD COUNT [id number]   MCV                 89.5 fL   

7 Unknown

 

          COMPLETE BLOOD COUNT [id number]   MCH                 30.1 pg   

7 Unknown

 

          COMPLETE BLOOD COUNT [id number]   MCHC                33.6 g/dL 

7 Unknown

 

          COMPLETE BLOOD COUNT [id number]   PLATELET COUNT           181 10e9/L 2017 Unknown

 

          COMPLETE BLOOD COUNT [id number]   Mean Plt Volume           11.7 fL   2017 Unknown

 

          COMPLETE BLOOD COUNT [id number]   Neut Auto           36.9 %    

7 Unknown

 

          COMPLETE BLOOD COUNT [id number]   Lymph Auto           50.4 %    20

17 Unknown

 

          COMPLETE BLOOD COUNT [id number]   Mono Auto           9.0 %     

7 Unknown

 

          COMPLETE BLOOD COUNT [id number]   RDW                 13.7 %    

7 Unknown

 

          COMPLETE BLOOD COUNT [id number]   Eos Auto            3.4 %     

7 Unknown

 

          COMPLETE BLOOD COUNT [id number]   Baso Auto           0.3 %     

7 Unknown

 

          COMPLETE BLOOD COUNT [id number]   Neutrophil Abs           2.21 10e9/L  Unknown

 

          COMPLETE BLOOD COUNT [id number]   Lymphocyte Abs           3.02 10e9/L  Unknown

 

          COMPLETE BLOOD COUNT [id number]   Monocyte Abs           0.54 10e9/L 2017 Unknown

 

          COMPLETE BLOOD COUNT [id number]   Eosinophil Abs           0.20 10e9/L  Unknown

 

          COMPLETE BLOOD COUNT [id number]   RDW-SD              44.0 fL   

7 Unknown

 

          COMPLETE BLOOD COUNT [id number]   Basophil Abs           0.02 10e9/L 2017 Unknown

 

          GLYCOSYLATED HEMOGLOBIN TEST 46906     Hgb A1c   72733-5   5.4 %     0

3/09/2017 Unknown

 

          THYROID STIMULATING HORMONE 75412     TSH                 2.200 uIU/mL

 2017 Unknown

 

          GFR CALC  6195632   GFR Non Afr Amr           50 mL/min 2017 Unk

nown

 

          GFR CALC  5372089   GFR Afr Amr           >60 mL/min 2017 Unknow

n

 

          MEAN GLUC [id number]   Calc Mean Gluc           108 mg/dL 2017 Unkn

own

 

          COMPREHENSIVE METABOLIC 82799     AST                 18 U/L    2017 Unknown

 

          COMPREHENSIVE METABOLIC 73304     ALT                 10 U/L    2017 Unknown

 

          COMPREHENSIVE METABOLIC 21414     BUN                 20 mg/dL  2017 Unknown

 

          COMPREHENSIVE METABOLIC 98668     ALBUMIN             4.1 g/dL  2017 Unknown

 

          COMPREHENSIVE METABOLIC 58522     CHLORIDE            109 mmol/L  Unknown

 

          COMPREHENSIVE METABOLIC 34869     Bili Total           0.6 mg/dL  Unknown

 

          COMPREHENSIVE METABOLIC 76390     ALK PHOS            64 U/L    2017 Unknown

 

          COMPREHENSIVE METABOLIC 19799     SODIUM              141 mmol/L  Unknown

 

          COMPREHENSIVE METABOLIC 41889     CREATININE           1.06 mg/dL 2017 Unknown

 

          COMPREHENSIVE METABOLIC 37556     CALCIUM             9.9 mg/dL 2017 Unknown

 

          COMPREHENSIVE METABOLIC 35673     POTASSIUM           4.2 mmol/L  Unknown

 

          COMPREHENSIVE METABOLIC 43992     Total Protein           6.3 g/dL   Unknown

 

          COMPREHENSIVE METABOLIC 78331     Glucose             99 mg/dL  2017 Unknown

 

          COMPREHENSIVE METABOLIC 78812     Bicarbonate           21 mmol/L 2017 Unknown

 

          COMPREHENSIVE METABOLIC 11193     AGAP                11 mmol/L 2017 Unknown

 

          LIPID GROUP [id number]     Cholesterol           169 mg/dL 2016 Unkno

wn

 

          LIPID GROUP [id number]     Triglyceride           165 mg/dL 2016 Unkn

own

 

          LIPID GROUP [id number]     HDL CHOLESTEROL           43 mg/dL  2016 U

nknown

 

          LIPID GROUP 03942     Chol/HDL Ratio           3.93 ratio 2016 U

nknown

 

          LIPID GROUP 24610     NON-HDL Chol           126 mg/dL 2016 Unkn

own

 

          LIPID GROUP [id number]     LDL Cholesterol           93 mg/dL  2016 U

nknown

 

          COMPREHENSIVE METABOLIC 89264     AST                 18 U/L    2016 Unknown

 

          COMPREHENSIVE METABOLIC 14111     ALT                 10 U/L    2016 Unknown

 

          COMPREHENSIVE METABOLIC 37682     BUN                 20 mg/dL  2016 Unknown

 

          COMPREHENSIVE METABOLIC 46613     ALBUMIN             3.9 g/dL  2016 Unknown

 

          COMPREHENSIVE METABOLIC 84128     CHLORIDE            110 mmol/L  Unknown

 

          COMPREHENSIVE METABOLIC 57657     Bili Total           0.5 mg/dL  Unknown

 

          COMPREHENSIVE METABOLIC 69116     ALK PHOS            72 U/L    2016 Unknown

 

          COMPREHENSIVE METABOLIC 49907     SODIUM              141 mmol/L  Unknown

 

          COMPREHENSIVE METABOLIC 77157     CREATININE           1.12 mg/dL  Unknown

 

          COMPREHENSIVE METABOLIC 23882     CALCIUM             9.7 mg/dL 2016 Unknown

 

          COMPREHENSIVE METABOLIC 95954     POTASSIUM           4.4 mmol/L  Unknown

 

          COMPREHENSIVE METABOLIC 94442     Total Protein           6.2 g/dL   Unknown

 

          COMPREHENSIVE METABOLIC 23748     Glucose             90 mg/dL  2016 Unknown

 

          COMPREHENSIVE METABOLIC 35599     Bicarbonate           23 mmol/L  Unknown

 

          COMPREHENSIVE METABOLIC 53877     AGAP                8 mmol/L  2016 Unknown

 

          GFR CALC  8151439   GFR Non Afr Amr           47 mL/min 2016 Unk

nown

 

          GFR CALC  2710872   GFR Afr Amr           57 mL/min 2016 Unknown

 

          GLYCOSYLATED HEMOGLOBIN TEST 16842     Hgb A1c   30103-1   5.5 %     0

2016 Unknown

 

          THYROID STIMULATING HORMONE 61963     TSH                 2.537 uIU/mL

 2016 Unknown

 

          FREE T4   06198     T4 Free             1.36 ng/dL 2016 Unknown

 

          COMPLETE BLOOD COUNT 6867550   WBC                 6.8 10e9/L 20

16 Unknown

 

          COMPLETE BLOOD COUNT [id number]   RBC                 4.20 10e12/L 2016 Unknown

 

          COMPLETE BLOOD COUNT [id number]   HEMOGLOBIN           12.5 g/dL 20

16 Unknown

 

          COMPLETE BLOOD COUNT [id number]   HEMATOCRIT           38.0 %    20

16 Unknown

 

          COMPLETE BLOOD COUNT 9315076   MCV                 90.5 fL   

6 Unknown

 

          COMPLETE BLOOD COUNT 7206383   MCH                 29.8 pg   

6 Unknown

 

          COMPLETE BLOOD COUNT 2084374   MCHC                32.9 g/dL 

6 Unknown

 

          COMPLETE BLOOD COUNT 8854984   PLATELET COUNT           197 10e9/L  Unknown

 

          COMPLETE BLOOD COUNT 8494924   Mean Plt Volume           11.7 fL    Unknown

 

          COMPLETE BLOOD COUNT [id number]   Neut Auto           41.3 %    

6 Unknown

 

          COMPLETE BLOOD COUNT [id number]   Lymph Auto           47.1 %    20

16 Unknown

 

          COMPLETE BLOOD COUNT [id number]   Mono Auto           7.8 %     

6 Unknown

 

          COMPLETE BLOOD COUNT 9070069   RDW                 13.8 %    

6 Unknown

 

          COMPLETE BLOOD COUNT [id number]   Eos Auto            3.4 %     

6 Unknown

 

          COMPLETE BLOOD COUNT [id number]   Baso Auto           0.4 %     

6 Unknown

 

          COMPLETE BLOOD COUNT [id number]   Neutrophil Abs           2.81 10e9/L  Unknown

 

          COMPLETE BLOOD COUNT 9984071   Lymphocyte Abs           3.20 10e9/L  Unknown

 

          COMPLETE BLOOD COUNT 7289277   Monocyte Abs           0.53 10e9/L  Unknown

 

          COMPLETE BLOOD COUNT 8778422   Eosinophil Abs           0.23 10e9/L  Unknown

 

          COMPLETE BLOOD COUNT 5257015   RDW-SD              44.4 fL   

6 Unknown

 

          COMPLETE BLOOD COUNT [id number]   Basophil Abs           0.03 10e9/L  Unknown

 

          MEAN GLUC 2564439   Calc Mean Gluc           111 mg/dL 2016 Unkn

own

 

          [place] [address]     Glucose             89 MG/DL   Unknown

 

          [place] [address]     CREATININE           1.12 MG/DL  Unknown

 

          [place] [address]     BUN                 20 MG/DL   Unknown

 

          [place] [address]     SODIUM              139 MMOL/L  Unknown

 

          [place] [address]     POTASSIUM           4.6 MMOL/L  Unknown

 

          [place] [address]     CHLORIDE            108 MMOL/L  Unknown

 

          [place] [address]     BICARB              26 MMOL/L  Unknown

 

          [place] [address]     ANION GAP           5 MEQ/L    Unknown

 

          [place] [address]     CALCIUM             10.0 MG/DL  Unknown

 

          GFR CALC  3229214   GFR AA              57.0L ML/MIN 2015 Unknow

n

 

          GFR CALC  7907699   GFR NON-AA           47.0L ML/MIN 2015 Unkno

wn

 

          THYROID STIMULATING HORMONE 76722     TSH                 2.378 uIU/ML

 09/10/2015 Unknown

 

          COMPLETE BLOOD COUNT 5551747   WBC                 6.4 10e9/L 09/10/20

15 Unknown

 

          COMPLETE BLOOD COUNT [id number]   RBC                 3.99 10e12/L 09/10/

2015 Unknown

 

          COMPLETE BLOOD COUNT [id number]   HGB                 11.9 g/dL 09/10/201

5 Unknown

 

          COMPLETE BLOOD COUNT [id number]   HCT DET             36.9 %    09/10/201

5 Unknown

 

          COMPLETE BLOOD COUNT [id number]   MCV                 92.5 fL   09/10/201

5 Unknown

 

          COMPLETE BLOOD COUNT [id number]   MCH                 29.8 pg   09/10/201

5 Unknown

 

          COMPLETE BLOOD COUNT [id number]   MCHC                32.2 g/dL 09/10/201

5 Unknown

 

          COMPLETE BLOOD COUNT [id number]   PLT                 172 10e9/L 09/10/20

15 Unknown

 

          COMPLETE BLOOD COUNT [id number]   MPV                 11.7 fL   09/10/201

5 Unknown

 

          COMPLETE BLOOD COUNT [id number]   ARIK %               40.4 %    09/10/201

5 Unknown

 

          COMPLETE BLOOD COUNT [id number]   LY %                48.0 %    09/10/201

5 Unknown

 

          COMPLETE BLOOD COUNT 6757987   MON %               8.3 %     09/10/201

5 Unknown

 

          COMPLETE BLOOD COUNT [id number]   EOS  %              2.8 %     09/10/201

5 Unknown

 

          COMPLETE BLOOD COUNT [id number]   BASO %              0.5 %     09/10/201

5 Unknown

 

          COMPLETE BLOOD COUNT [id number]   RDW                 13.6 %    09/10/201

5 Unknown

 

          COMPLETE BLOOD COUNT [id number]   ABS ARIK             2.59 10e9/L 09/10/2

015 Unknown

 

          COMPLETE BLOOD COUNT [id number]   ABS LYMPH           3.07 10e9/L 09/10/2

015 Unknown

 

          COMPLETE BLOOD COUNT [id number]   ABS MONO            0.53 10e9/L 09/10/2

015 Unknown

 

          COMPLETE BLOOD COUNT [id number]   ABS EOS             0.18 10e9/L 09/10/2

015 Unknown

 

          COMPLETE BLOOD COUNT [id number]   ABS BASO            0.03 10e9/L 09/10/2

015 Unknown

 

          COMPLETE BLOOD COUNT [id number]   RDW-SD              44.9 fL   09/10/201

5 Unknown

 

          LIPID GROUP [id number]     HDL TEST            42 MG/DL  09/10/2015 Unknown

 

          LIPID GROUP [id number]     TRIG                177 MG/DL 09/10/2015 Unknown

 

          LIPID GROUP [id number]     TEST LDL            72 MG/DL  09/10/2015 Unknown

 

          LIPID GROUP [id number]     CHOL                149 MG/DL 09/10/2015 Unknown

 

          LIPID GROUP [id number]     RCHOL/HDL           3.55 RATIO 09/10/2015 Unknow

n

 

          LIPID GROUP [id number]     NON-HDL CH           107 MG/DL 09/10/2015 Unknow

n

 

          GLYCOSYLATED HEMOGLOBIN TEST 99662     A1C HPLC  98072-8   5.5 %     0

9/10/2015 Unknown

 

          FREE T4   58765     FREE T4             1.39 NG/DL 09/10/2015 Unknown

 

          GFR CALC  8006850   GFR AA              55.0L ML/MIN 09/10/2015 Unknow

n

 

          GFR CALC  7577947   GFR NON-AA           46.0L ML/MIN 09/10/2015 Unkno

wn

 

          COMPREHENSIVE METABOLIC 65339     AST                 17 U/L    09/10/

2015 Unknown

 

          COMPREHENSIVE METABOLIC 39132     ALT                 10 IU/L   09/10/

2015 Unknown

 

          COMPREHENSIVE METABOLIC 97002     BUN                 20 MG/DL  09/10/

2015 Unknown

 

          COMPREHENSIVE METABOLIC 45836     ALBUMIN             3.9 GM/DL 09/10/

2015 Unknown

 

          COMPREHENSIVE METABOLIC 24597     CHLORIDE            111 MMOL/L 09/10

/2015 Unknown

 

          COMPREHENSIVE METABOLIC 87272     BILI TOT            0.4 MG/DL 09/10/

2015 Unknown

 

          COMPREHENSIVE METABOLIC 01312     ALK PHOS            70 U/L    09/10/

2015 Unknown

 

          COMPREHENSIVE METABOLIC 81260     SODIUM              142 MMOL/L 09/10

/2015 Unknown

 

          COMPREHENSIVE METABOLIC 03844     CREATININE           1.16 MG/DL  Unknown

 

          COMPREHENSIVE METABOLIC 28788     CALCIUM             9.4 MG/DL 09/10/

2015 Unknown

 

          COMPREHENSIVE METABOLIC 55949     POTASSIUM           4.6 MMOL/L 09/10

/2015 Unknown

 

          COMPREHENSIVE METABOLIC 71444     PROT TOT            6.2 GM/DL 09/10/

2015 Unknown

 

          COMPREHENSIVE METABOLIC 46903     Glucose             90 MG/DL  09/10/

2015 Unknown

 

          COMPREHENSIVE METABOLIC 97874     BICARB              24 MMOL/L 09/10/

2015 Unknown

 

          COMPREHENSIVE METABOLIC 38190     ANION GAP           7 MEQ/L   09/10/

2015 Unknown

 

          THYROID STIMULATING HORMONE 55284     TSH                 2.427 uIU/ML

 2015 Unknown

 

          LIPID GROUP [id number]     HDL TEST            47 MG/DL  2015 Unknown

 

          LIPID GROUP 71942     TRIG                145 MG/DL 2015 Unknown

 

          LIPID GROUP 63940     TEST LDL            73 MG/DL  2015 Unknown

 

          LIPID GROUP [id number]     CHOL                149 MG/DL 2015 Unknown

 

          LIPID GROUP 30156     RCHOL/HDL           3.17 RATIO 2015 Unknow

n

 

          LIPID GROUP 61046     NON-HDL CH           102 MG/DL 2015 Unknow

n

 

          COMPREHENSIVE METABOLIC 11623     AST                 17 U/L    2015 Unknown

 

          COMPREHENSIVE METABOLIC 04891     ALT                 9 IU/L    2015 Unknown

 

          COMPREHENSIVE METABOLIC 59222     BUN                 19 MG/DL  2015 Unknown

 

          COMPREHENSIVE METABOLIC 82225     ALBUMIN             4.3 GM/DL 2015 Unknown

 

          COMPREHENSIVE METABOLIC 41305     CHLORIDE            108 MMOL/L  Unknown

 

          COMPREHENSIVE METABOLIC 36692     BILI TOT            0.5 MG/DL 2015 Unknown

 

          COMPREHENSIVE METABOLIC 77196     ALK PHOS            68 U/L    2015 Unknown

 

          COMPREHENSIVE METABOLIC 91953     SODIUM              140 MMOL/L  Unknown

 

          COMPREHENSIVE METABOLIC 34854     CREATININE           1.08 MG/DL 2015 Unknown

 

          COMPREHENSIVE METABOLIC 88435     CALCIUM             9.9 MG/DL 2015 Unknown

 

          COMPREHENSIVE METABOLIC 98279     POTASSIUM           4.3 MMOL/L  Unknown

 

          COMPREHENSIVE METABOLIC 91450     PROT TOT            7.2 GM/DL 2015 Unknown

 

          COMPREHENSIVE METABOLIC 39219     Glucose             94 MG/DL  2015 Unknown

 

          COMPREHENSIVE METABOLIC 65819     BICARB              26 MMOL/L 2015 Unknown

 

          COMPREHENSIVE METABOLIC 38676     ANION GAP           6 MEQ/L   2015 Unknown

 

          GFR CALC  2650489   GFR AA              60.0L ML/MIN 2015 Unknow

n

 

          GFR CALC  7280271   GFR NON-AA           49.0L ML/MIN 2015 Unkno

wn

 

          GLYCOSYLATED HEMOGLOBIN TEST 17432     A1C HPLC  81415-2   5.6 %     0

3/06/2015 Unknown

 

          COMPLETE BLOOD COUNT 2721419   WBC                 7.2 10e9/L 20

15 Unknown

 

          COMPLETE BLOOD COUNT 2190317   RBC                 4.28 10e12/L 2015 Unknown

 

          COMPLETE BLOOD COUNT 2465011   HGB                 12.8 g/dL 

5 Unknown

 

          COMPLETE BLOOD COUNT 0893686   HCT DET             39.3 %    

5 Unknown

 

          COMPLETE BLOOD COUNT 7457099   MCV                 91.8 fL   

5 Unknown

 

          COMPLETE BLOOD COUNT 8188038   MCH                 29.9 pg   

5 Unknown

 

          COMPLETE BLOOD COUNT 6374694   MCHC                32.6 g/dL 

5 Unknown

 

          COMPLETE BLOOD COUNT [id number]   PLT                 189 10e9/L 20

15 Unknown

 

          COMPLETE BLOOD COUNT [id number]   MPV                 11.2 fL   

5 Unknown

 

          COMPLETE BLOOD COUNT 8609978   ARIK %               38.0 %    

5 Unknown

 

          COMPLETE BLOOD COUNT 7109721   LY %                51.0 %    

5 Unknown

 

          COMPLETE BLOOD COUNT 4160590   MON %               7.7 %     

5 Unknown

 

          COMPLETE BLOOD COUNT 0489813   EOS  %              2.9 %     

5 Unknown

 

          COMPLETE BLOOD COUNT 4015914   BASO %              0.4 %     

5 Unknown

 

          COMPLETE BLOOD COUNT [id number]   RDW                 14.0 %    

5 Unknown

 

          COMPLETE BLOOD COUNT 8135547   ABS ARIK             2.74 10e9/L 

015 Unknown

 

          COMPLETE BLOOD COUNT [id number]   ABS LYMPH           3.67 10e9/L 

015 Unknown

 

          COMPLETE BLOOD COUNT 4788593   ABS MONO            0.55 10e9/L 

015 Unknown

 

          COMPLETE BLOOD COUNT [id number]   ABS EOS             0.21 10e9/L 

015 Unknown

 

          COMPLETE BLOOD COUNT [id number]   ABS BASO            0.03 10e9/L 

015 Unknown

 

          COMPLETE BLOOD COUNT 7756867   RDW-SD              46.1 fL   

5 Unknown

 

          FREE T4   92266     FREE T4             1.14 NG/DL 2015 Unknown

 

          GLYCOSYLATED HEMOGLOBIN TEST 61038     A1C HPLC  12322-7   5.2 %     0

2014 Unknown

 

          FREE T4   89532     FREE T4             1.40 NG/DL 2014 Unknown

 

          GFR CALC  1803328   GFR AA              >60 ML/MIN 2014 Unknown

 

          GFR CALC  7823274   GFR NON-AA           52.0L ML/MIN 2014 Unkno

wn

 

          COMPREHENSIVE METABOLIC 33664     AST                 15 U/L    2014 Unknown

 

          COMPREHENSIVE METABOLIC 15400     ALT                 9 IU/L    2014 Unknown

 

          COMPREHENSIVE METABOLIC 21029     BUN                 17 MG/DL  2014 Unknown

 

          COMPREHENSIVE METABOLIC 75412     ALBUMIN             4.0 GM/DL 2014 Unknown

 

          COMPREHENSIVE METABOLIC 75373     CHLORIDE            112 MMOL/L  Unknown

 

          COMPREHENSIVE METABOLIC 65235     BILI TOT            0.5 MG/DL 2014 Unknown

 

          COMPREHENSIVE METABOLIC 00101     ALK PHOS            66 U/L    2014 Unknown

 

          COMPREHENSIVE METABOLIC 46761     SODIUM              140 MMOL/L  Unknown

 

          COMPREHENSIVE METABOLIC 49246     CREATININE           1.03 MG/DL  Unknown

 

          COMPREHENSIVE METABOLIC 93355     CALCIUM             9.5 MG/DL 2014 Unknown

 

          COMPREHENSIVE METABOLIC 57012     POTASSIUM           4.1 MMOL/L  Unknown

 

          COMPREHENSIVE METABOLIC 09232     PROT TOT            6.2 GM/DL 2014 Unknown

 

          COMPREHENSIVE METABOLIC 91932     Glucose             102 MG/DL 2014 Unknown

 

          COMPREHENSIVE METABOLIC 57595     BICARB              23 MMOL/L 2014 Unknown

 

          COMPREHENSIVE METABOLIC 67823     ANION GAP           5 MEQ/L   2014 Unknown

 

          THYROID STIMULATING HORMONE 14252     TSH                 2.074 uIU/ML

 2014 Unknown

 

          VITAMIN B 12 FOLIC ACID 33106|[id number] VIT B 12            423 PG/ML  Unknown

 

          VITAMIN B 12 FOLIC ACID 48198|71014 FOLIC ACID           19.7 NG/ML  Unknown

 

          LIPID GROUP 46074     HDL TEST            40 MG/DL  2014 Unknown

 

          LIPID GROUP 99838     TRIG                145 MG/DL 2014 Unknown

 

          LIPID GROUP 37051     TEST LDL            81 MG/DL  2014 Unknown

 

          LIPID GROUP 07883     CHOL                150 MG/DL 2014 Unknown

 

          LIPID GROUP [id number]     RCHOL/HDL           3.75 RATIO 2014 Unknow

n

 

          COMPLETE BLOOD COUNT [id number]   WBC                 6.0 10e9/L 20

14 Unknown

 

          COMPLETE BLOOD COUNT 8777930   RBC                 4.26 10e12/L 2014 Unknown

 

          COMPLETE BLOOD COUNT 1009813   HGB                 12.7 g/dL 

4 Unknown

 

          COMPLETE BLOOD COUNT 9342913   HCT DET             38.7 %    

4 Unknown

 

          COMPLETE BLOOD COUNT 9424603   MCV                 90.8 fL   

4 Unknown

 

          COMPLETE BLOOD COUNT 0870194   MCH                 29.8 pg   

4 Unknown

 

          COMPLETE BLOOD COUNT 7256228   MCHC                32.8 g/dL 

4 Unknown

 

          COMPLETE BLOOD COUNT [id number]   PLT                 178 10e9/L 20

14 Unknown

 

          COMPLETE BLOOD COUNT [id number]   MPV                 11.7 fL   

4 Unknown

 

          COMPLETE BLOOD COUNT 3933066   ARIK %               30.5 %    

4 Unknown

 

          COMPLETE BLOOD COUNT 0393861   LY %                55.4 %    

4 Unknown

 

          COMPLETE BLOOD COUNT 2066116   MON %               9.0 %     

4 Unknown

 

          COMPLETE BLOOD COUNT 3872094   EOS  %              4.4 %     

4 Unknown

 

          COMPLETE BLOOD COUNT 3573388   BASO %              0.7 %     

4 Unknown

 

          COMPLETE BLOOD COUNT 3534394   RDW                 13.3 %    

4 Unknown

 

          COMPLETE BLOOD COUNT [id number]   ABS ARIK             1.83 10e9/L 

014 Unknown

 

          COMPLETE BLOOD COUNT 2573471   ABS LYMPH           3.32 10e9/L 

014 Unknown

 

          COMPLETE BLOOD COUNT [id number]   ABS MONO            0.54 10e9/L 

014 Unknown

 

          COMPLETE BLOOD COUNT [id number]   ABS EOS             0.26 10e9/L 

014 Unknown

 

          COMPLETE BLOOD COUNT 6893413   ABS BASO            0.04 10e9/L 

014 Unknown

 

          COMPLETE BLOOD COUNT [id number]   RDW-SD              43.2 fL   

4 Unknown

 

          HEMOGLOBIN A1C (GLYCOSYLATED) 1209102   A1C [facility] [place]  74513-2   5.5 %     

2012 Unknown

 

          COMPLETE BLOOD COUNT [id number]   WBC                 6.0 10e9/L 20

12 Unknown

 

          COMPLETE BLOOD COUNT 4843361   RBC                 4.22 10e12/L 2012 Unknown

 

          COMPLETE BLOOD COUNT 6941543   HGB                 12.4 g/dL 

2 Unknown

 

          COMPLETE BLOOD COUNT 7646983   HCT DET             38.2 %    

2 Unknown

 

          COMPLETE BLOOD COUNT 0697447   MCV                 90.5 fL   

2 Unknown

 

          COMPLETE BLOOD COUNT 3667779   MCH                 29.4 pg   

2 Unknown

 

          COMPLETE BLOOD COUNT [id number]   MCHC                32.5 g/dL 

2 Unknown

 

          COMPLETE BLOOD COUNT [id number]   PLT                 187 10e9/L 20

12 Unknown

 

          COMPLETE BLOOD COUNT [id number]   MPV                 11.5 fL   

2 Unknown

 

          COMPLETE BLOOD COUNT [id number]   ARIK %               36.4 %    

2 Unknown

 

          COMPLETE BLOOD COUNT 9281912   LY %                51.0 %    

2 Unknown

 

          COMPLETE BLOOD COUNT [id number]   MON %               8.7 %     

2 Unknown

 

          COMPLETE BLOOD COUNT 6497305   EOS  %              3.2 %     

2 Unknown

 

          COMPLETE BLOOD COUNT 1167561   BASO %              0.7 %     

2 Unknown

 

          COMPLETE BLOOD COUNT [id number]   RDW                 13.7 %    

2 Unknown

 

          COMPLETE BLOOD COUNT 2498010   ABS ARIK             2.18 10e9/L 

012 Unknown

 

          COMPLETE BLOOD COUNT [id number]   ABS LYMPH           3.06 10e9/L 

012 Unknown

 

          COMPLETE BLOOD COUNT [id number]   ABS MONO            0.52 10e9/L 

012 Unknown

 

          COMPLETE BLOOD COUNT [id number]   ABS EOS             0.19 10e9/L 

012 Unknown

 

          COMPLETE BLOOD COUNT 2577809   ABS BASO            0.04 10e9/L 

012 Unknown

 

          COMPLETE BLOOD COUNT [id number]   RDW-SD              44.3 fL   

2 Unknown

 

          LIPID GROUP [id number]     HDL TEST            42 MG/DL  2012 Unknown

 

          LIPID GROUP 01875     TRIG                156 MG/DL 2012 Unknown

 

          LIPID GROUP [id number]     TEST LDL            80 MG/DL  2012 Unknown

 

          LIPID GROUP [id number]     CHOL                153 MG/DL 2012 Unknown

 

          LIPID GROUP [id number]     RCHOL/HDL           3.64 RATIO 2012 Unknow

n

 

          FREE T4   80250     FREE T4             1.22 NG/DL 2012 Unknown

 

          COMPREHENSIVE METABOLIC 73655     AST                 20 U/L    2012 Unknown

 

          COMPREHENSIVE METABOLIC 69746     ALT                 11 IU/L   2012 Unknown

 

          COMPREHENSIVE METABOLIC 89582     BUN                 19 MG/DL  2012 Unknown

 

          COMPREHENSIVE METABOLIC 36600     ALBUMIN             4.3 GM/DL 2012 Unknown

 

          COMPREHENSIVE METABOLIC 63423     CHLORIDE            109 MMOL/L  Unknown

 

          COMPREHENSIVE METABOLIC 39465     BILI TOT            0.6 MG/DL 2012 Unknown

 

          COMPREHENSIVE METABOLIC 03388     ALK PHOS            84 U/L    2012 Unknown

 

          COMPREHENSIVE METABOLIC 19287     SODIUM              142 MMOL/L  Unknown

 

          COMPREHENSIVE METABOLIC 33021     CREATININE           1.09 MG/DL  Unknown

 

          COMPREHENSIVE METABOLIC 87692     CALCIUM             9.8 MG/DL 2012 Unknown

 

          COMPREHENSIVE METABOLIC 22707     POTASSIUM           4.2 MMOL/L  Unknown

 

          COMPREHENSIVE METABOLIC 93236     PROT TOT            6.4 GM/DL 2012 Unknown

 

          COMPREHENSIVE METABOLIC 28083     Glucose             89 MG/DL  2012 Unknown

 

          COMPREHENSIVE METABOLIC 72237     BICARB              25 MMOL/L 2012 Unknown

 

          COMPREHENSIVE METABOLIC 50826     ANION GAP           8 MEQ/L   2012 Unknown

 

          GFR CALC  3638747   GFR AA              60.0L ML/MIN 2012 Unknow

n

 

          GFR CALC  4999192   GFR NON-AA           49.0L ML/MIN 2012 Unkno

wn

 

          THYROID STIMULATING HORMONE 70187     TSH                 2.450 uIU/ML

 2012 Unknown

 

          COMPREHENSIVE METABOLIC 63576     AST                 22 U/L    2012 Unknown

 

          COMPREHENSIVE METABOLIC 88925     ALT                 14 IU/L   2012 Unknown

 

          COMPREHENSIVE METABOLIC 39986     BUN                 21 MG/DL  2012 Unknown

 

          COMPREHENSIVE METABOLIC 83013     ALBUMIN             4.3 GM/DL 2012 Unknown

 

          COMPREHENSIVE METABOLIC 91098     CHLORIDE            106 MMOL/L  Unknown

 

          COMPREHENSIVE METABOLIC 69007     BILI TOT            0.4 MG/DL 2012 Unknown

 

          COMPREHENSIVE METABOLIC 35205     ALK PHOS            80 U/L    2012 Unknown

 

          COMPREHENSIVE METABOLIC 73943     SODIUM              141 MMOL/L  Unknown

 

          COMPREHENSIVE METABOLIC 02530     CREATININE           1.13 MG/DL  Unknown

 

          COMPREHENSIVE METABOLIC 43196     CALCIUM             9.4 MG/DL 2012 Unknown

 

          COMPREHENSIVE METABOLIC 88840     POTASSIUM           4.3 MMOL/L  Unknown

 

          COMPREHENSIVE METABOLIC 40529     PROT TOT            6.7 GM/DL 2012 Unknown

 

          COMPREHENSIVE METABOLIC 68693     Glucose             98 MG/DL  2012 Unknown

 

          COMPREHENSIVE METABOLIC 17101     BICARB              25 MMOL/L 2012 Unknown

 

          COMPREHENSIVE METABOLIC 00515     ANION GAP           10 MEQ/L  2012 Unknown

 

          LIPID GROUP 48554     HDL TEST            44 MG/DL  2012 Unknown

 

          LIPID GROUP [id number]     TRIG                164 MG/DL 2012 Unknown

 

          LIPID GROUP [id number]     TEST LDL            98 MG/DL  2012 Unknown

 

          LIPID GROUP [id number]     CHOL                175 MG/DL 2012 Unknown

 

          LIPID GROUP [id number]     RCHOL/HDL           3.98 RATIO 2012 Unknow

n

 

          COMPLETE BLOOD COUNT 46379     WBC                 6.7 10e9/L 20

12 Unknown

 

          COMPLETE BLOOD COUNT 62313     RBC                 4.36 10e12/L 2012 Unknown

 

          COMPLETE BLOOD COUNT 13474     HGB                 12.9 g/dL 

2 Unknown

 

          COMPLETE BLOOD COUNT 32262     HCT DET             39.4 %    

2 Unknown

 

          COMPLETE BLOOD COUNT 98782     MCV                 90.4 fL   

2 Unknown

 

          COMPLETE BLOOD COUNT 84955     MCH                 29.6 pg   

2 Unknown

 

          COMPLETE BLOOD COUNT 61282     MCHC                32.7 g/dL 

2 Unknown

 

          COMPLETE BLOOD COUNT 56420     PLT                 184 10e9/L 20

12 Unknown

 

          COMPLETE BLOOD COUNT 15978     MPV                 10.9 fL   

2 Unknown

 

          COMPLETE BLOOD COUNT 30417     ARIK %               41.5 %    

2 Unknown

 

          COMPLETE BLOOD COUNT 80391     LY %                45.7 %    

2 Unknown

 

          COMPLETE BLOOD COUNT 37396     MON %               9.4 %     

2 Unknown

 

          COMPLETE BLOOD COUNT 15051     EOS  %              3.0 %     

2 Unknown

 

          COMPLETE BLOOD COUNT 40414     BASO %              0.4 %     

2 Unknown

 

          COMPLETE BLOOD COUNT 45439     RDW                 13.2 %    

2 Unknown

 

          COMPLETE BLOOD COUNT 83985     ABS ARIK             2.78 10e9/L 

012 Unknown

 

          COMPLETE BLOOD COUNT 10999     ABS LYMPH           3.06 10e9/L 

012 Unknown

 

          COMPLETE BLOOD COUNT 02174     ABS MONO            0.63 10e9/L 

012 Unknown

 

          COMPLETE BLOOD COUNT 22080     ABS EOS             0.20 10e9/L 

012 Unknown

 

          COMPLETE BLOOD COUNT 78013     ABS BASO            0.03 10e9/L 

012 Unknown

 

          COMPLETE BLOOD COUNT 92341     RDW-SD              42.3 fL   

2 Unknown

 

          GFR CALC  4719760   GFR AA              57.0L ML/MIN 2012 Unknow

n

 

          GFR CALC  2982865   GFR NON-AA           47.0L ML/MIN 2012 Unkno

wn

 

          THYROID STIMULATING HORMONE 70048     TSH                 2.663 uIU/ML

 2012 Unknown

 

          FREE T4   41410     FREE T4             1.15 NG/DL 2012 Unknown

 

          THYROID STIMULATING HORMONE 45137     TSH                 1.908 uIU/ML

 2011 Unknown

 

          COMPLETE BLOOD COUNT 73963     WBC                 6.4 10e9/L 20

11 Unknown

 

          COMPLETE BLOOD COUNT 03392     RBC                 3.92 10e12/L 2011 Unknown

 

          COMPLETE BLOOD COUNT 65036     HGB                 11.8 g/dL 

1 Unknown

 

          COMPLETE BLOOD COUNT 22176     HCT DET             36.0 %    

1 Unknown

 

          COMPLETE BLOOD COUNT 56722     MCV                 91.8 fL   

1 Unknown

 

          COMPLETE BLOOD COUNT 95200     MCH                 30.1 pg   

1 Unknown

 

          COMPLETE BLOOD COUNT 60760     MCHC                32.8 g/dL 

1 Unknown

 

          COMPLETE BLOOD COUNT 59247     PLT                 176 10e9/L 20

11 Unknown

 

          COMPLETE BLOOD COUNT 15248     MPV                 11.4 fL   

1 Unknown

 

          COMPLETE BLOOD COUNT 36767     ARIK %               50.4 %    

1 Unknown

 

          COMPLETE BLOOD COUNT 63743     LY %                35.5 %    

1 Unknown

 

          COMPLETE BLOOD COUNT 50100     MON %               10.2 %    

1 Unknown

 

          COMPLETE BLOOD COUNT 00530     EOS  %              3.3 %     

1 Unknown

 

          COMPLETE BLOOD COUNT 11503     BASO %              0.6 %     

1 Unknown

 

          COMPLETE BLOOD COUNT 43767     RDW                 13.7 %    

1 Unknown

 

          COMPLETE BLOOD COUNT 39143     ABS ARIK             3.23 10e9/L 

011 Unknown

 

          COMPLETE BLOOD COUNT 42986     ABS LYMPH           2.27 10e9/L 

011 Unknown

 

          COMPLETE BLOOD COUNT 95751     ABS MONO            0.65 10e9/L 

011 Unknown

 

          COMPLETE BLOOD COUNT 60603     ABS EOS             0.21 10e9/L 

011 Unknown

 

          COMPLETE BLOOD COUNT 38436     ABS BASO            0.04 10e9/L 

011 Unknown

 

          COMPLETE BLOOD COUNT 46179     RDW-SD              45.3 fL   

1 Unknown

 

          GFR CALC  9739841   GFR AA              >60 ML/MIN 2011 Unknown

 

          GFR CALC  8959766   GFR NON-AA           53.0L ML/MIN 2011 Unkno

wn

 

          FREE T4   84698     FREE T4             1.20 NG/DL 2011 Unknown

 

          COMPREHENSIVE METABOLIC 60106     AST                 17 U/L    2011 Unknown

 

          COMPREHENSIVE METABOLIC 60184     ALT                 9 IU/L    2011 Unknown

 

          COMPREHENSIVE METABOLIC 64590     BUN                 16 MG/DL  2011 Unknown

 

          COMPREHENSIVE METABOLIC 46681     ALBUMIN             4.0 GM/DL 2011 Unknown

 

          COMPREHENSIVE METABOLIC 04502     CHLORIDE            108 MMOL/L  Unknown

 

          COMPREHENSIVE METABOLIC 41808     BILI TOT            0.5 MG/DL 2011 Unknown

 

          COMPREHENSIVE METABOLIC 75892     ALK PHOS            76 U/L    2011 Unknown

 

          COMPREHENSIVE METABOLIC 08944     SODIUM              139 MMOL/L  Unknown

 

          COMPREHENSIVE METABOLIC 00150     CREATININE           1.02 MG/DL 2011 Unknown

 

          COMPREHENSIVE METABOLIC 92324     CALCIUM             9.2 MG/DL 2011 Unknown

 

          COMPREHENSIVE METABOLIC 30070     POTASSIUM           4.5 MMOL/L  Unknown

 

          COMPREHENSIVE METABOLIC 21363     PROT TOT            6.1 GM/DL 2011 Unknown

 

          COMPREHENSIVE METABOLIC 79875     Glucose             93 MG/DL  2011 Unknown

 

          COMPREHENSIVE METABOLIC 85195     BICARB              26 MMOL/L 2011 Unknown

 

          COMPREHENSIVE METABOLIC 26097     ANION GAP           5 MEQ/L   2011 Unknown

 

          LIPID GROUP 92598     HDL TEST            46 MG/DL  2011 Unknown

 

          LIPID GROUP 59671     TRIG                102 MG/DL 2011 Unknown

 

          LIPID GROUP 05018     TEST LDL            88 MG/DL  2011 Unknown

 

          LIPID GROUP 68300     CHOL                154 MG/DL 2011 Unknown

 

          LIPID GROUP 63483     RCHOL/HDL           3.35 RATIO 2011 Unknow

n







Procedures





                    Procedure           Codes               Date

 

                    ROUTINE VENIPUNCTURE CPT-4: 80925        2019

 

                    LIPID PANEL         CPT-4: 34309        2019

 

                    FLU VACC PRSV FREE INC ANTIG 65 AND OLDER CPT-4: 55216      

  10/22/2019

 

                    FLU VACC PRSV FREE INC ANTIG 65 AND OLDER CPT-4: 79661      

  10/22/2019

 

                    ADMIN INFLUENZA VIRUS VAC CPT-4:         10/22/2019

 

                    COMPREHEN METABOLIC PANEL CPT-4: 86009        10/11/2019

 

                    ROUTINE VENIPUNCTURE CPT-4: 29156        10/11/2019

 

                    ROUTINE VENIPUNCTURE CPT-4: 44987        06/10/2019

 

                    ASSAY THYROID STIM HORMONE CPT-4: 29501        06/10/2019

 

                    COMPREHEN METABOLIC PANEL CPT-4: 23344        06/10/2019

 

                    COMPLETE CBC W/AUTO DIFF WBC CPT-4: 30723        06/10/2019

 

                    URINALYSIS NONAUTO W/O SCOPE CPT-4: 89777        2019

 

                    URINE CULTURE/ COLONY COUNT CPT-4: 56387        2019

 

                    URINE CULTURE/ COLONY COUNT CPT-4: 31611        10/02/2018

 

                    ROUTINE VENIPUNCTURE CPT-4: 59771        2018

 

                    ASSAY OF FREE THYROXINE CPT-4: 33620        2018

 

                    ASSAY THYROID STIM HORMONE CPT-4: 92687        2018

 

                    COMPLETE CBC W/AUTO DIFF WBC CPT-4: 44228        2018

 

                    METABOLIC PANEL TOTAL CA CPT-4: 96615        2018

 

                    FLU VACC PRSV FREE INC ANTIG 65 AND OLDER CPT-4: 62907      

  2018

 

                    ASSAY, GLUCOSE, BLOOD QUANT CPT-4: 43380        2018

 

                    ADMIN INFLUENZA VIRUS VAC CPT-4:         2018

 

                    ROUTINE VENIPUNCTURE CPT-4: 78340        2018

 

                    COMPREHEN METABOLIC PANEL CPT-4: 96661        2018

 

                    COMPLETE CBC W/AUTO DIFF WBC CPT-4: 56723        2018

 

                    A1C HPLC            CPT-4: 41282        2018

 

                    ASSAY OF TROPONIN QUANT CPT-4: 39834        2018

 

                    LIPID PANEL         CPT-4: 76905        2018

 

                    THER/PROPH/DIAG INJ SC/IM CPT-4: 16223        2018

 

                    TRIAMCINOLONE ACET INJ NOS CPT-4:         2018

 

                    URINALYSIS NONAUTO W/O SCOPE CPT-4: 61141        2018

 

                    URINE CULTURE/ COLONY COUNT CPT-4: 45800        2018

 

                    FLU VACC PRSV FREE INC ANTIG 65 AND OLDER CPT-4: 69354      

  10/06/2017

 

                    ADMIN INFLUENZA VIRUS VAC CPT-4:         10/06/2017

 

                    ROUTINE VENIPUNCTURE CPT-4: 66564        2017

 

                    COMPREHEN METABOLIC PANEL CPT-4: 52328        2017

 

                    COMPLETE CBC W/AUTO DIFF WBC CPT-4: 33371        2017

 

                    LIPID PANEL         CPT-4: 71663        2017

 

                    A1C HPLC            CPT-4: 95062        2017

 

                    ASSAY THYROID STIM HORMONE CPT-4: 52218        2017

 

                    ROUTINE VENIPUNCTURE CPT-4: 04644        2017

 

                    ASSAY THYROID STIM HORMONE CPT-4: 86705        2017

 

                    COMPREHEN METABOLIC PANEL CPT-4: 11790        2017

 

                    COMPLETE CBC W/AUTO DIFF WBC CPT-4: 16317        2017

 

                    A1C HPLC            CPT-4: 92174        2017

 

                    FLU VACC PRSV FREE INC ANTIG 65 AND OLDER CPT-4: 48597      

  10/07/2016

 

                    ADMIN INFLUENZA VIRUS VAC CPT-4:         10/07/2016

 

                    ROUTINE VENIPUNCTURE CPT-4: 25780        2016

 

                    ASSAY OF FREE THYROXINE CPT-4: 83206        2016

 

                    ASSAY THYROID STIM HORMONE CPT-4: 06463        2016

 

                    COMPREHEN METABOLIC PANEL CPT-4: 99883        2016

 

                    COMPLETE CBC W/AUTO DIFF WBC CPT-4: 08759        2016

 

                    LIPID PANEL         CPT-4: 24815        2016

 

                    A1C HPLC            CPT-4: 10509        2016

 

                    URINALYSIS NONAUTO W/O SCOPE CPT-4: 61511        2016

 

                    ROUTINE VENIPUNCTURE CPT-4: 51179        2015

 

                    METABOLIC PANEL TOTAL CA CPT-4: 20672        2015

 

                    PRESCRIP TRANSMIT VIA ERX SY CPT-4:         2015

 

                    FLU VACC PRSV FREE INC ANTIG 65 AND OLDER CPT-4: 28836      

  10/16/2015

 

                    ADMIN INFLUENZA VIRUS VAC CPT-4:         10/16/2015

 

                    URINALYSIS NONAUTO W/O SCOPE CPT-4: 20072        09/15/2015

 

                    URINE CULTURE/ COLONY COUNT CPT-4: 25047        09/15/2015

 

                    ROUTINE VENIPUNCTURE CPT-4: 78934        09/10/2015

 

                    ASSAY OF FREE THYROXINE CPT-4: 57088        09/10/2015

 

                    ASSAY THYROID STIM HORMONE CPT-4: 26707        09/10/2015

 

                    COMPREHEN METABOLIC PANEL CPT-4: 37859        09/10/2015

 

                    COMPLETE CBC W/AUTO DIFF WBC CPT-4: 30733        09/10/2015

 

                    LIPID PANEL         CPT-4: 83428        09/10/2015

 

                    A1C HPLC            CPT-4: 77052        09/10/2015

 

                    CERUM REMOVAL       CPT-4: 66108        2015

 

                    PRESCRIP TRANSMIT VIA ERX SY CPT-4:         2015

 

                    FLUZONE, 5ML (Medicare) CPT-4:         10/17/2014

 

                    ADMIN INFLUENZA VIRUS VAC CPT-4:         10/17/2014

 

                    PRESCRIP TRANSMIT VIA ERX SY CPT-4:         2014

 

                    PRESCRIP TRANSMIT VIA ERX SY CPT-4:         2014

 

                    PRESCRIP TRANSMIT VIA ERX SY CPT-4:         2014

 

                    ROUTINE VENIPUNCTURE CPT-4: 10216        2014

 

                    ASSAY OF FREE THYROXINE CPT-4: 23217        2014

 

                    ASSAY THYROID STIM HORMONE CPT-4: 41028        2014

 

                    COMPREHEN METABOLIC PANEL CPT-4: 69324        2014

 

                    COMPLETE CBC W/AUTO DIFF WBC CPT-4: 80506        2014

 

                    LIPID PANEL         CPT-4: 76382        2014

 

                    A1C HPLC            CPT-4: 63710        2014

 

                    VITAMIN B 12 FOLIC ACID CPT-4: 58965|42203  2014

 

                    PRESCRIP TRANSMIT VIA ERX SY CPT-4:         2014

 

                    PRESCRIP TRANSMIT VIA ERX SY CPT-4:         10/15/2013

 

                    FLUZONE, 5ML (Medicare) CPT-4:         2013

 

                    ADMIN INFLUENZA VIRUS VAC CPT-4:         2013

 

                    PRESCRIP TRANSMIT VIA ERX SY CPT-4:         2013

 

                    PRESCRIP TRANSMIT VIA ERX SY CPT-4:         05/10/2013

 

                    ROUTINE VENIPUNCTURE CPT-4: 99314        2012

 

                    ASSAY OF FREE THYROXINE CPT-4: 75259        2012

 

                    ASSAY THYROID STIM HORMONE CPT-4: 94909        2012

 

                    COMPREHEN METABOLIC PANEL CPT-4: 85160        2012

 

                    COMPLETE CBC W/AUTO DIFF WBC CPT-4: 05246        2012

 

                    LIPID PANEL         CPT-4: 99171        2012

 

                    A1C GLYCOSYLATED HEMOGLOBIN TEST CPT-4: 37502        

012

 

                    CERUM REMOVAL       CPT-4: 86731        2012

 

                    PRESCRIP TRANSMIT VIA ERX SY CPT-4:         2012

 

                    PRESCRIP TRANSMIT VIA ERX SY CPT-4:         10/10/2012

 

                    FLUZONE, 5ML (Medicare) CPT-4:         2012

 

                    ADMIN INFLUENZA VIRUS VAC CPT-4:         2012

 

                    ASSAY, GLUCOSE, BLOOD QUANT CPT-4: 64633        2012

 

                    URINALYSIS NONAUTO W/O SCOPE CPT-4: 03970        2012

 

                    URINE CULTURE/ COLONY COUNT CPT-4: 39210        2012

 

                    ROUTINE VENIPUNCTURE CPT-4: 30082        2012

 

                    ASSAY OF FREE THYROXINE CPT-4: 08163        2012

 

                    ASSAY THYROID STIM HORMONE CPT-4: 06336        2012

 

                    COMPREHEN METABOLIC PANEL CPT-4: 25696        2012

 

                    COMPLETE CBC W/AUTO DIFF WBC CPT-4: 93313        2012

 

                    LIPID PANEL         CPT-4: 52275        2012

 

                    ASSAY OF INSULIN    CPT-4: 30211        2012

 

                    A1C GLYCOSYLATED HEMOGLOBIN TEST CPT-4: 67382        

012

 

                    DRAIN/INJECT JOINT/BURSA CPT-4: 38625        2012

 

                    METHYLPREDNISOLONE 40 MG INJ CPT-4:         2012

 

                    TRIAMCINOLONE ACET INJ NOS CPT-4:         2012

 

                    PRESCRIP TRANSMIT VIA ERX SY CPT-4:         2012

 

                    PRESCRIP TRANSMIT VIA ERX SY CPT-4:         2012

 

                    METHYLPREDNISOLONE 40 MG INJ CPT-4:         2012

 

                    DRAIN/INJECT JOINT/BURSA CPT-4: 31169        2012

 

                    TRIAMCINOLONE ACET INJ NOS CPT-4:         2012

 

                    PRESCRIP TRANSMIT VIA ERX SY CPT-4:         2012

 

                    ROUTINE VENIPUNCTURE CPT-4: 29435        2012

 

                    ASSAY OF FREE THYROXINE CPT-4: 45202        2012

 

                    ASSAY THYROID STIM HORMONE CPT-4: 14289        2012

 

                    COMPREHEN METABOLIC PANEL CPT-4: 74220        2012

 

                    COMPLETE CBC W/AUTO DIFF WBC CPT-4: 54813        2012

 

                    LIPID PANEL         CPT-4: 68192        2012

 

                    PRESCRIP TRANSMIT VIA ERX SY CPT-4:         2012

 

                    CERUM REMOVAL       CPT-4: 09134        2011

 

                    PRESCRIP TRANSMIT VIA ERX SY CPT-4:         2011

 

                    FLUZONE, 5ML (Medicare) CPT-4:         10/05/2011

 

                    ADMIN INFLUENZA VIRUS VAC CPT-4:         10/05/2011

 

                    PRESCRIP TRANSMIT VIA ERX SY CPT-4:         2011

 

                    URINALYSIS NONAUTO W/O SCOPE CPT-4: 91112        2011

 

                    URINE CULTURE/ COLONY COUNT CPT-4: 59949        2011

 

                    CUR TOBACCO NON-USER CPT-4:         2011

 

                    ROUTINE VENIPUNCTURE CPT-4: 33399        2011

 

                    COMPLETE CBC W/AUTO DIFF WBC CPT-4: 74435        2011

 

                    COMPREHEN METABOLIC PANEL CPT-4: 08062        2011

 

                    LIPID PANEL         CPT-4: 20562        2011

 

                    ASSAY THYROID STIM HORMONE CPT-4: 76093        2011

 

                    ASSAY OF FREE THYROXINE CPT-4: 68190        2011

 

                    PRESCRIP TRANSMIT VIA ERX SY CPT-4:         2011

 

                    INJ TRIGGER POINT 1/2 MUSCL CPT-4: 44551        2011

 

                    TRIAMCINOLONE ACET INJ NOS CPT-4:         2011

 

                    METHYLPREDNISOLONE 40 MG INJ CPT-4:         2011

 

                    THER/PROPH/DIAG INJ SC/IM CPT-4: 37010        2011

 

                    KETOROLAC TROMETHAMINE INJ CPT-4:         2011

 

                    PRESCRIP TRANSMIT VIA ERX SY CPT-4:         2010

 

                    FLU VACCINE 3 YRS & > IM UP 64 CPT-4: 44534        10/06/201

0

 

                    ADMIN INFLUENZA VIRUS VAC CPT-4:         10/06/2010

 

                    URINALYSIS NONAUTO W/O SCOPE CPT-4: 36172        2010

 

                    URINE CULTURE/ COLONY COUNT CPT-4: 04775        2010

 

                    PRESCRIP TRANSMIT VIA ERX SY CPT-4:         2010

 

                    THER/PROPH/DIAG INJ SC/IM CPT-4: 81536        2010

 

                    VITAMIN B12 INJECTION CPT-4:         2010

 

                    THER/PROPH/DIAG INJ SC/IM CPT-4: 87193        2010

 

                    VITAMIN B12 INJECTION CPT-4:         2010

 

                    ROUTINE VENIPUNCTURE CPT-4: 47189        2010







Vital Signs





                          Date                      Vital

 

                    2020          Blood Pressure 1: 144/82 Code: 8480-6 

art Rate 1: 56 bpm

 

                2020      Blood Pressure 1: 154/86 Code: 8480-6 Heart Rate

 1: 64 bpm 

Respiratory Rate: 20 bpm SpO2: 99%           Temperature: 37.1 (C) / 98.7 (F) We

ight: 215 

lbs 

 

             2019   Blood Pressure 1: 140/70 Code: 8480-6 Heart Rate 1: 57

 bpm SpO2: 99%    

Temperature: 35.9 (C) / 96.6 (F)        Weight: 215 lbs 

 

                06/10/2019      Blood Pressure 1: 144/70 Code: 8480-6 Heart Rate

 1: 60 bpm 

Respiratory Rate: 20 bpm SpO2: 95%           Temperature: 36.4 (C) / 97.6 (F) We

ight: 214 

lbs 

 

                2019      Blood Pressure 1: 128/78 Code: 8480-6 Heart Rate

 1: 56 bpm 

Respiratory Rate: 20 bpm SpO2: 98%           Temperature: 36.3 (C) / 97.4 (F) We

ight: 213 

lbs 

 

                2018      Blood Pressure 1: 142/60 Code: 8480-6 BMI: 38.2 

Code: 08412-1 Heart 

Rate 1: 48 bpm  Height: 5'2"    Respiratory Rate: 20 bpm SpO2: 98%       Tempera

ture: 36.7

(C) / 98.1 (F)                          Weight: 212 lbs 

 

                2018      Blood Pressure 1: 142/64 Code: 8480-6 BMI: 38.5 

Code: 85737-1 Heart 

Rate 1: 52 bpm  Height: 5'2"    Respiratory Rate: 22 bpm SpO2: 96%       Tempera

ture: 36.1

(C) / 96.9 (F)                          Weight: 214 lbs 

 

                10/02/2018      Blood Pressure 1: 124/80 Code: 8480-6 BMI: 38.3 

Code: 96262-6 Heart 

Rate 1: 68 bpm      Height: 5'2"        Respiratory Rate: 20 bpm Temperature: 36

.3 (C) / 

97.4 (F)                                Weight: 213 lbs 

 

                2018      Blood Pressure 1: 132/78 Code: 8480-6 BMI: 37.6 

Code: 76099-7 Heart 

Rate 1: 68 bpm  Height: 5'2"    Respiratory Rate: 20 bpm SpO2: 97%       Tempera

ture: 36.8

(C) / 98.2 (F)                          Weight: 209 lbs 

 

                2018      Blood Pressure 1: 150/76 Code: 8480-6 BMI: 38.5 

Code: 32764-0 Heart 

Rate 1: 64 bpm  Height: 5'2"    Respiratory Rate: 20 bpm SpO2: 97%       Tempera

ture: 36.2

(C) / 97.2 (F)                          Weight: 214 lbs 

 

                2018      Blood Pressure 1: 122/74 Code: 8480-6 BMI: 38.2 

Code: 45418-9 Heart 

Rate 1: 64 bpm  Height: 5'2"    Respiratory Rate: 18 bpm SpO2: 96%       Tempera

ture: 35.8

(C) / 96.4 (F)                          Weight: 212 lbs 

 

                2018      Blood Pressure 1: 124/78 Code: 8480-6 BMI: 37.8 

Code: 88609-7 Heart 

Rate 1: 76 bpm      Height: 5'2"        Respiratory Rate: 20 bpm Temperature: 36

.8 (C) / 

98.3 (F)                                Weight: 210 lbs 

 

                2018      Blood Pressure 1: 136/70 Code: 8480-6 BMI: 38.0 

Code: 17012-1 Heart 

Rate 1: 68 bpm  Height: 5'2"    Respiratory Rate: 20 bpm SpO2: 97%       Tempera

ture: 36.8

(C) / 98.2 (F)                          Weight: 211 lbs 

 

                2018      Blood Pressure 1: 140/65 Code: 8480-6 Heart Rate

 1: 75 bpm 

Respiratory Rate: 24 bpm SpO2: 95%           Temperature: 37.0 (C) / 98.6 (F) We

ight: 211 

lbs 

 

                2018      Blood Pressure 1: 154/70 Code: 8480-6 BMI: 37.6 

Code: 75726-8 Heart 

Rate 1: 76 bpm  Height: 5'2"    Respiratory Rate: 20 bpm SpO2: 98%       Tempera

ture: 36.9

(C) / 98.5 (F)                          Weight: 209 lbs 

 

                2017      Blood Pressure 1: 156/70 Code: 8480-6 BMI: 37.1 

Code: 33159-9 Heart 

Rate 1: 72 bpm  Height: 5'2"    Respiratory Rate: 20 bpm SpO2: 97%       Tempera

ture: 37.0

(C) / 98.6 (F)                          Weight: 206 lbs 

 

                2017      Blood Pressure 1: 152/78 Code: 8480-6 BMI: 36.8 

Code: 49773-6 Heart 

Rate 1: 78 bpm  Height: 5'2"    Respiratory Rate: 20 bpm SpO2: 98%       Tempera

ture: 36.1

(C) / 97.0 (F)                          Weight: 204 lbs 

 

                2017      Blood Pressure 1: 142/70 Code: 8480-6 BMI: 36.9 

Code: 01152-8 Heart 

Rate 1: 64 bpm  Height: 5'2"    Respiratory Rate: 20 bpm SpO2: 96%       Tempera

ture: 36.5

(C) / 97.7 (F)                          Weight: 205 lbs 

 

                2017      Blood Pressure 1: 142/68 Code: 8480-6 Heart Rate

 1: 88 bpm 

Respiratory Rate: 18 bpm SpO2: 98%           Temperature: 36.3 (C) / 97.3 (F) We

ight: 209 

lbs 

 

                2016      Blood Pressure 1: 130/78 Code: 8480-6 Heart Rate

 1: 68 bpm 

Respiratory Rate: 20 bpm SpO2: 95%           Temperature: 36.4 (C) / 97.6 (F) We

ight: 212 

lbs 

 

                2016      Blood Pressure 1: 122/64 Code: 8480-6 BMI: 39.1 

Code: 34184-5 Heart 

Rate 1: 76 bpm      Height: 5'2"        Respiratory Rate: 20 bpm Temperature: 36

.8 (C) / 

98.2 (F)                                Weight: 217 lbs 

 

                2016      Blood Pressure 1: 144/70 Code: 8480-6 BMI: 39.4 

Code: 89626-9 Heart 

Rate 1: 76 bpm      Height: 5'2"        Respiratory Rate: 20 bpm Temperature: 36

.6 (C) / 

97.9 (F)                                Weight: 219 lbs 

 

                2015      Blood Pressure 1: 152/60 Code: 8480-6 BMI: 39.6 

Code: 93381-5 Heart 

Rate 1: 84 bpm      Height: 5'2"        Respiratory Rate: 20 bpm Temperature: 37

.0 (C) / 

98.6 (F)                                Weight: 220 lbs 

 

                2015      Blood Pressure 1: 146/76 Code: 8480-6 BMI: 39.8 

Code: 91904-3 Heart 

Rate 1: 88 bpm      Height: 5'2"        Respiratory Rate: 20 bpm Temperature: 37

.0 (C) / 

98.6 (F)                                Weight: 221 lbs 

 

                2015      Blood Pressure 1: 132/70 Code: 8480-6 BMI: 39.1 

Code: 39523-9 Heart 

Rate 1: 88 bpm      Height: 5'2"        Respiratory Rate: 20 bpm Temperature: 36

.4 (C) / 

97.6 (F)                                Weight: 217 lbs 

 

                2015      Blood Pressure 1: 132/66 Code: 8480-6 BMI: 39.9 

Code: 20916-2 Heart 

Rate 1: 72 bpm      Height: 5'2"        Respiratory Rate: 20 bpm Temperature: 36

.9 (C) / 

98.4 (F)                                Weight: 218 lbs 

 

                2015      Blood Pressure 1: 136/80 Code: 8480-6 Heart Rate

 1: 76 bpm 

Respiratory Rate: 20 bpm  Temperature: 36.7 (C) / 98.0 (F) Weight: 224 lbs 

 

                2015      Blood Pressure 1: 134/78 Code: 8480-6 BMI: 39.7 

Code: 65805-8 Heart 

Rate 1: 84 bpm      Height: 5'2"        Respiratory Rate: 20 bpm Temperature: 36

.7 (C) / 

98.0 (F)                                Weight: 217 lbs 

 

                2014      Blood Pressure 1: 146/78 Code: 8480-6 BMI: 39.5 

Code: 26876-9 Heart 

Rate 1: 82 bpm      Height: 5'2"        Respiratory Rate: 18 bpm Temperature: 35

.6 (C) / 

96.1 (F)                                Weight: 216 lbs 

 

                2014      Blood Pressure 1: 134/70 Code: 8480-6 BMI: 37.9 

Code: 90673-7 Heart 

Rate 1: 80 bpm      Height: 5'3"        Respiratory Rate: 20 bpm Temperature: 36

.8 (C) / 

98.2 (F)                                Weight: 214 lbs 

 

                2014      Blood Pressure 1: 132/70 Code: 8480-6 BMI: 37.6 

Code: 89363-1 Heart 

Rate 1: 80 bpm      Height: 5'3"        Respiratory Rate: 20 bpm Temperature: 36

.8 (C) / 

98.3 (F)                                Weight: 212 lbs 

 

                2014      Blood Pressure 1: 116/74 Code: 8480-6 Heart Rate

 1: 68 bpm 

Respiratory Rate: 20 bpm  Temperature: 36.2 (C) / 97.1 (F) Weight: 212 lbs 

 

                10/15/2013      Blood Pressure 1: 132/82 Code: 8480-6 BMI: 37.4 

Code: 72009-4 Heart 

Rate 1: 76 bpm      Height: 5'3"        Respiratory Rate: 20 bpm Temperature: 36

.7 (C) / 

98.0 (F)                                Weight: 211 lbs 

 

                    2013          Blood Pressure 1: 130/76 Code: 8480-6 He

art Rate 1: 78 bpm

 

                2013      Blood Pressure 1: 140/82 Code: 8480-6 BMI: 36.8 

Code: 79583-1 Heart 

Rate 1: 66 bpm      Height: 5'3"        Respiratory Rate: 20 bpm Temperature: 36

.1 (C) / 

96.9 (F)                                Weight: 208 lbs 

 

                2013      Blood Pressure 1: 138/80 Code: 8480-6 BMI: 36.4 

Code: 54533-9 Heart 

Rate 1: 72 bpm      Height: 5'4"        Respiratory Rate: 20 bpm Temperature: 36

.7 (C) / 

98.0 (F)                                Weight: 212 lbs 

 

                2013      Blood Pressure 1: 124/70 Code: 8480-6 BMI: 36.9 

Code: 45118-4 Heart 

Rate 1: 60 bpm      Height: 5'4"        Temperature: 36.1 (C) / 97.0 (F) Weight:

 215 lbs 

 

                05/10/2013      Blood Pressure 1: 132/86 Code: 8480-6 BMI: 36.9 

Code: 50616-2 Heart 

Rate 1: 76 bpm      Height: 5'4"        Respiratory Rate: 20 bpm Temperature: 36

.8 (C) / 

98.2 (F)                                Weight: 215 lbs 

 

                2013      Blood Pressure 1: 134/82 Code: 8480-6 BMI: 36.6 

Code: 56137-5 Heart 

Rate 1: 72 bpm      Height: 5'4"        Respiratory Rate: 20 bpm Temperature: 36

.3 (C) / 

97.4 (F)                                Weight: 213 lbs 

 

                2012      Blood Pressure 1: 142/80 Code: 8480-6 BMI: 37.1 

Code: 33965-4 Heart 

Rate 1: 76 bpm      Height: 5'4"        Respiratory Rate: 20 bpm Temperature: 36

.8 (C) / 

98.3 (F)                                Weight: 216 lbs 

 

                10/23/2012      Blood Pressure 1: 128/68 Code: 8480-6 BMI: 37.6 

Code: 46824-3 Heart 

Rate 1: 72 bpm      Height: 5'4"        Respiratory Rate: 20 bpm Temperature: 36

.6 (C) / 

97.9 (F)                                Weight: 219 lbs 

 

                10/10/2012      Blood Pressure 1: 122/70 Code: 8480-6 Heart Rate

 1: 76 bpm 

Respiratory Rate: 20 bpm  Temperature: 36.7 (C) / 98.1 (F) Weight: 218 lbs 

 

                    2012          Blood Pressure 1: 132/80 Code: 8480-6 He

art Rate 1: 84 bpm

 

                2012      Blood Pressure 1: 128/78 Code: 8480-6 BMI: 38.1 

Code: 40390-7 Heart 

Rate 1: 84 bpm      Height: 5'4"        Respiratory Rate: 20 bpm Temperature: 36

.9 (C) / 

98.4 (F)                                Weight: 222 lbs 

 

                2012      Blood Pressure 1: 138/80 Code: 8480-6 BMI: 37.9 

Code: 04386-5 Heart 

Rate 1: 74 bpm      Height: 5'4"        Temperature: 36.1 (C) / 97.0 (F) Weight:

 221 lbs 

 

                2012      Blood Pressure 1: 126/78 Code: 8480-6 BMI: 38.4 

Code: 30655-0 Heart 

Rate 1: 72 bpm      Height: 5'4"        Respiratory Rate: 20 bpm Temperature: 36

.7 (C) / 

98.0 (F)                                Weight: 224 lbs 

 

                2012      Blood Pressure 1: 138/72 Code: 8480-6 BMI: 38.4 

Code: 20044-0 Heart 

Rate 1: 72 bpm      Height: 5'4"        Respiratory Rate: 20 bpm Temperature: 36

.6 (C) / 

97.9 (F)                                Weight: 224 lbs 

 

                2012      Blood Pressure 1: 122/78 Code: 8480-6 BMI: 38.8 

Code: 18903-8 Heart 

Rate 1: 88 bpm      Height: 5'4"        Respiratory Rate: 20 bpm Temperature: 36

.6 (C) / 

97.8 (F)                                Weight: 226 lbs 

 

                2012      Blood Pressure 1: 116/60 Code: 8480-6 BMI: 38.1 

Code: 24306-8 Heart 

Rate 1: 92 bpm      Height: 5'4"        Respiratory Rate: 20 bpm Temperature: 36

.8 (C) / 

98.2 (F)                                Weight: 222 lbs 

 

                2011      Blood Pressure 1: 118/62 Code: 8480-6 BMI: 37.9 

Code: 47228-9 Heart 

Rate 1: 80 bpm      Height: 5'4"        Temperature: 36.5 (C) / 97.7 (F) Weight:

 221 lbs 

 

                2011      Blood Pressure 1: 132/70 Code: 8480-6 Heart Rate

 1: 84 bpm 

Respiratory Rate: 20 bpm  Temperature: 36.7 (C) / 98.0 (F) Weight: 221 lbs 

 

                2011      Blood Pressure 1: 114/72 Code: 8480-6 BMI: 37.6 

Code: 46077-8 Heart 

Rate 1: 76 bpm      Height: 5'4"        Respiratory Rate: 20 bpm Temperature: 36

.8 (C) / 

98.3 (F)                                Weight: 219 lbs 

 

                    2011          Blood Pressure 1: 136/76 Code: 8480-6 Te

mperature: 36.0 (C) / 96.8 

(F)                                     Weight: 219 lbs 8 oz

 

                2011      Blood Pressure 1: 128/80 Code: 8480-6 Heart Rate

 1: 72 bpm 

Temperature: 36.3 (C) / 97.4 (F)        Weight: 218 lbs 

 

                2011      Blood Pressure 1: 126/70 Code: 8480-6 Heart Rate

 1: 72 bpm 

Temperature: 36.7 (C) / 98.0 (F)

 

                    2010          Blood Pressure 1: 126/78 Code: 8480-6 Te

mperature: 36.2 (C) / 97.1 

(F)                                     Weight: 217 lbs 8 oz

 

                2010      Blood Pressure 1: 116/70 Code: 8480-6 Heart Rate

 1: 72 bpm 

Temperature: 36.4 (C) / 97.6 (F)        Weight: 222 lbs 

 

                2010      Blood Pressure 1: 114/78 Code: 8480-6 Heart Rate

 1: 72 bpm 

Temperature: 37.1 (C) / 98.8 (F)        Weight: 225 lbs 

 

                2010      Blood Pressure 1: 128/78 Code: 8480-6 Heart Rate

 1: 76 bpm 

Temperature: 36.6 (C) / 97.8 (F)        Weight: 224 lbs 

 

                2010      Blood Pressure 1: 116/78 Code: 8480-6 Heart Rate

 1: 80 bpm 

Temperature: 36.4 (C) / 97.6 (F)        Weight: 226 lbs 

 

                2010      Blood Pressure 1: 120/70 Code: 8480-6 BMI: 38.3 

Code: 87243-2 Heart 

Rate 1: 88 bpm      Height: 5'5"        Temperature: 36.4 (C) / 97.6 (F) Weight:

 230 lbs 







Functional Status

No Functional Status data



Reason For Visit





                    Reason For Visit    Effective Dates     Notes

 

                    blood pressure check 2020           

 

                    high blood pressure 2020           

 

                    lab draw            2019           

 

                    lab draw            10/11/2019           

 

                    hearing loss        2019          left ear

 

                    follow up           06/10/2019           

 

                    follow up           2019          Patient has upcoming

 appointment with Dr Calire and carotid 

dopplers tomorrow

 

                    follow up           2018          Patient had heart ca

th on 10-30-18 and one of the bypass 

veins in spasming

 

                    follow up           2018          4 Week

 

                    follow up           10/02/2018           

 

                    follow up           2018           

 

                    high blood pressure 2018          Dr Claire has ordered

 holter monitor and 

hydralazine 50mg PRN

 

                    dizziness           2018          On  morning darren delvalle woke up and went to the bathroom 

and after lying down became very dizzy. Patient has hx of vertigo so didn't 
think anything of it. She usually just has them intermittently, but had more 
that day. While at [employer] began to feel very ill and became very shaky. She got 
home and sat in the recliner and had the jittery/nervous feeling. Later that 
night it finally resolved. Patient feels like she had a spell like this around 
the  and thought it was due to extreme heat exhaustion.

 

                    follow up           2018           

 

                    back pain           2018           

 

                    otalgia             2018           

 

                    back pain           2018           

 

                    injection(s)        10/06/2017          flu shot

 

                    lab draw            2017           

 

                    follow up           2017           

 

                    pelvic pain         2017          Patient states pain 

started in May with a "Constant Ache"

to left inguinal area. Patient states at that time pain was intermittent. Then, 
approximately 2nd week  of  pain worsened and radiates to left low back 
area.

 

                    lab draw            2017           

 

                    hyperglycemia       2017           

 

                    cough               2017           

 

                    otalgia             2016           

 

                    injection(s)        10/07/2016          Influenza

 

                    lab draw            2016           

 

                    constipation        2016           

 

                    flank pain          2016           

 

                    follow up           2015          3mo fwup

 

                    injection(s)        10/16/2015          Influenza

 

                    acute renal failure 09/15/2015           

 

                    lab draw            09/10/2015           

 

                    fatigue             2015           

 

                    otalgia             2015           

 

                    pain, limb          2015           

 

                    follow up           2015          Hospital fwup

 

                    high blood pressure 2015           

 

                    injection(s)        10/17/2014          flu shot

 

                    sinusitis           2014           

 

                    high blood pressure 2014           

 

                    cough               2014          Was panfilo at Dr Tyree teixeira's office so he started he on amlodipine 

10mg but seems to be dragging her down. Patient decreased to 1/2 tablet this 
last week

 

                    lab draw            2014           

 

                    cough               2014           

 

                    cough               10/15/2013           

 

                    high blood pressure 2013           

 

                    follow up           2013          ER

 

                    dizziness           2013           

 

                    follow up           2013           

 

                    otalgia             05/10/2013           

 

                    breast complaint    2013           

 

                    lab draw            2012           

 

                    follow up           2012          2mo fwup

 

                    follow up           10/23/2012          2wk fwup

 

                    gas and bloating    10/10/2012          Dr Claire has her mon

itoring because was high in his 

office at last visit

 

                    injection(s)        2012           

 

                    dizziness           2012           

 

                    dizziness           2012           

 

                    lab draw            2012           

 

                    muscle weakness     2012          concerns of simvasta

tin with fatigue and muscle 

weakness

 

                    leg pain/sciatica   2012           

 

                    hip pain            2012           

 

                    back pain           2012          has tried ice pack, 

muscle relaxers, and moist heat

 

                    back pain           2012           

 

                    fatigue             2012          in hands/feet

 

                    otalgia             2011           

 

                    follow up           2011          2wk fwup

 

                    back pain           2011          thinks ulcer may hav

e returned

 

                    lab draw            2011           

 

                    dizziness           2011          Left ear and facial 

pain, right ear pain 

 

                    follow up           2011          1wk fwup

 

                    hip pain            2011          feels very draggy, f

atigue, BP 80/63, normally running 

100's/60's

 

                    chills              2010           

 

                    injection(s)        10/06/2010          flu shot

 

                    follow up           2010          6wk fwup, had HH rep

aired and is starting to feel better, 

started on reglan 10mg tid

 

                    follow up           2010          hosp fwup

 

                    follow up           2010          1mo fwup, saw Dr Danna du and hasn't gave cardiac clearance 

yet for HH repair, did have chemical stress test yesterday and waiting on 
results

 

                    follow up           2010          from EGD/colonoscopy

--has Hiatal Hernia and wants to do 

repair

 

                    follow up           2010           







Encounters





             Encounter    Performer    Location     Codes        Date

 

                                        ([id number]) OFFICE/OUTPATIENT VISIT EST

Diagnosis: Essential hypertension[ICD10: I10]

Diagnosis: Palpitations[ICD10: R00.2]

Diagnosis: Stress reaction[ICD10: F43.0] [facility]            CPT-4: 38841              2020

 

                                        ([id number]) NURSE/OUTPATIENT VISIT EST

Diagnosis: Mixed hyperlipidemia[ICD10: E78.2] Akanksha DRIVER DO [facility]            CPT-4: 32039              2019

 

                                        ([id number]) NURSE/OUTPATIENT VISIT EST

Diagnosis: FLU VACCINE[ICD10: Z23] Akanksha KEY DO 

[facility]                       CPT-4: 46827              10/22/2019

 

                                        ([id number]) NURSE/OUTPATIENT VISIT EST

Diagnosis: Chronic kidney disease, stage 1[ICD10: N18.1] [facility]anksha [facility]IVER [facility] CPT-4: 32030              10/11/2019

 

                                        ([id number]) OFFICE/OUTPATIENT VISIT EST

Diagnosis: Acute serous otitis media, left ear[ICD10: H65.02] Nat DRIVER DO [facility] CPT-4: 78938              2019

 

                                        ([id number]) OFFICE/OUTPATIENT VISIT EST

Diagnosis: Essential (primary) hypertension[ICD10: I10]

Diagnosis: Coronary atherosclerosis due to calcified coronary lesion[ICD10: 
I25.84]

Diagnosis: Hypoglycemia, unspecified[ICD10: E16.2]

Diagnosis: Other fatigue[ICD10: R53.83]

Diagnosis: Urinary tract infection, site not specified[ICD10: N39.0] Akanksha DRIVER DO [facility] CPT-4: 67843        06/10/2019

 

                                        ([id number]) OFFICE/OUTPATIENT VISIT EST

Diagnosis: Essential (primary) hypertension[ICD10: I10]

Diagnosis: Gastro-esophageal reflux disease without esophagitis[ICD10: K21.9]

Diagnosis: Urinary tract infection, site not specified[ICD10: N39.0] Akanksha DRIVER [facility] CPT-4: 24756        2019

 

                                        (73442) OFFICE/OUTPATIENT VISIT EST

Diagnosis: Gastro-esophageal reflux disease without esophagitis[ICD10: K21.9]

Diagnosis: Epigastric pain[ICD10: R10.13] [employer] [employer]            CPT-4: 78344              2018

 

                                        (17781) OFFICE/OUTPATIENT VISIT EST

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

Diagnosis: Essential (primary) hypertension[ICD10: I10]

Diagnosis: Generalized anxiety disorder[ICD10: F41.1]

Diagnosis: Gastro-esophageal reflux disease without esophagitis[ICD10: K21.9] 

[facility]anksha DRIVER DO [facility] CPT-4: 91647        2018

 

                                        OFFICE/OUTPATIENT VISIT EST

Diagnosis: Gastro-esophageal reflux disease without esophagitis[ICD10: K21.9]

Diagnosis: Palpitations[ICD10: R00.2]

Diagnosis: Urinary tract infection, site not specified[ICD10: N39.0]

Diagnosis: Generalized anxiety disorder[ICD10: F41.1] [employer] [employer] [facility] CPT-4: 51418              10/02/2018

 

                                        ([id number]) NURSE/OUTPATIENT VISIT EST

Diagnosis: Coronary atherosclerosis due to calcified coronary lesion[ICD10: 
I25.84]

Diagnosis: Hypoglycemia, unspecified[ICD10: E16.2]

Diagnosis: Dizziness and giddiness[ICD10: R42]

Diagnosis: Occlusion and stenosis of bilateral carotid arteries[ICD10: I65.23] 

Akanksha DRIVER DO [facility] CPT-4: 02817        2018

 

                                        OFFICE/OUTPATIENT VISIT EST

Diagnosis: Epigastric pain[ICD10: R10.13]

Diagnosis: Generalized anxiety disorder[ICD10: F41.1]

Diagnosis: FLU VACCINE[ICD10: Z23] Akanksha KEY [employer] 

[facility]                       CPT-4: 16647              2018

 

                                        ([id number]) OFFICE/OUTPATIENT VISIT EST

Diagnosis: Essential (primary) hypertension[ICD10: I10]

Diagnosis: Hypoglycemia, unspecified[ICD10: E16.2]

Diagnosis: Gastro-esophageal reflux disease without esophagitis[ICD10: K21.9] 

[employer]anksha [employer] [facility] CPT-4: 28018        2018

 

                                        ([id number]) OFFICE/OUTPATIENT VISIT EST

Diagnosis: Dizziness and giddiness[ICD10: R42] Nat DRIVER [facility]            CPT-4: 24036              2018

 

                                        ([id number]) OFFICE/OUTPATIENT VISIT EST

Diagnosis: Cervicalgia[ICD10: M54.2]

Diagnosis: Other spondylosis with radiculopathy, cervical region[ICD10: M47.22] 

Akanksha DRIVER [facility] CPT-4: 92036        2018

 

                                        ([id number]) OFFICE/OUTPATIENT VISIT EST

Diagnosis: Hypoglycemia, unspecified[ICD10: E16.2]

Diagnosis: Other spondylosis with radiculopathy, cervical region[ICD10: M47.22]

Diagnosis: Vertigo of central origin, bilateral[ICD10: H81.43]

Diagnosis: Cervicocranial syndrome[ICD10: M53.0] Akanksha Xiangrodo KEVINANN MARIE DRIVER [facility]         CPT-4: 83120              2018

 

                                        (09583) OFFICE/OUTPATIENT VISIT EST

Diagnosis: Benign paroxysmal vertigo, bilateral[ICD10: H81.13]

Diagnosis: Otalgia, bilateral[ICD10: H92.03] aNt DRIVER [facility]            CPT-4: 84098              2018

 

                                        ([id number]) OFFICE/OUTPATIENT VISIT EST

Diagnosis: Low back pain[ICD10: M54.5]

Diagnosis: Radiculopathy, lumbosacral region[ICD10: M54.17]

Diagnosis: Left lower quadrant pain[ICD10: R10.32] Akanksha Xiangrodo [place]

SAramis KRISTEL [facility]         CPT-4: 76150              2018

 

                                        ([id number]) OFFICE/OUTPATIENT VISIT EST

Diagnosis: FLU VACCINE[ICD10: Z23] Akanksha KEVINQUELINE OLIVIA KEY [facility]                       CPT-4: 05017              10/06/2017

 

                                        (11225) OFFICE/OUTPATIENT VISIT EST

Diagnosis: Mixed hyperlipidemia[ICD10: E78.2]

Diagnosis: Essential (primary) hypertension[ICD10: I10]

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

Diagnosis: Impaired fasting glucose[ICD10: R73.01]

Diagnosis: Other fatigue[ICD10: R53.83] Akanksha DRIVER

[facility]                    CPT-4: 15442              2017

 

                                        ([id number]) OFFICE/OUTPATIENT VISIT EST

Diagnosis: Pain in thoracic spine[ICD10: M54.6]

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 

Akanksha DRIVER [facility] CPT-4: 42460        2017

 

                                        ([id number]) OFFICE/OUTPATIENT VISIT EST

Diagnosis: Left lower quadrant pain[ICD10: R10.32]

Diagnosis: Low back pain[ICD10: M54.5] Akanksha SYKES 

[facility]                    CPT-4: 96758              2017

 

                                        (89964) OFFICE/OUTPATIENT VISIT EST

Diagnosis: Mixed hyperlipidemia[ICD10: E78.2]

Diagnosis: Essential (primary) hypertension[ICD10: I10]

Diagnosis: Hypoglycemia, unspecified[ICD10: E16.2] Akanksha DRIVER [facility]         CPT-4: 84208              2017

 

                                        (58003) OFFICE/OUTPATIENT VISIT EST

Diagnosis: Impaired fasting glucose[ICD10: R73.01]

Diagnosis: Mastodynia[ICD10: N64.4]

Diagnosis: Mixed hyperlipidemia[ICD10: E78.2]

Diagnosis: Essential (primary) hypertension[ICD10: I10]

Diagnosis: Other fatigue[ICD10: R53.83] Akanksha DRIVER

[facility]                    CPT-4: 89453              2017

 

                                        (08053) OFFICE/OUTPATIENT VISIT EST

Diagnosis: Acute upper respiratory infection, unspecified[ICD10: J06.9] Luba Stevenson              [facility] 13911        2017

 

                                        (35575) OFFICE/OUTPATIENT VISIT EST

Diagnosis: Acute mastoiditis without complications, right ear[ICD10: H70.001] 

Akanksha DRIVER [facility] CPT-4: 15152        2016

 

                                        (44438) OFFICE/OUTPATIENT VISIT EST

Diagnosis: FLU VACCINE[ICD10: Z23] Akanksha KEY DO 

[facility]                       CPT-4: 75546              10/07/2016

 

                                        (56025) OFFICE/OUTPATIENT VISIT EST

Diagnosis: Mixed hyperlipidemia[ICD10: E78.2]

Diagnosis: Essential (primary) hypertension[ICD10: I10]

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

Diagnosis: Impaired fasting glucose[ICD10: R73.01] Akanksha IZQUIERDOTAMMY

OLIVIA DRIVER DO [facility]         CPT-4: 47261              2016

 

                                        (54768) OFFICE/OUTPATIENT VISIT EST

Diagnosis: Constipation, unspecified[ICD10: K59.00] Akanksha DRIVER DO [facility] CPT-4: 98299              2016

 

                                        (53582) OFFICE/OUTPATIENT VISIT EST

Diagnosis: Essential (primary) hypertension[ICD10: I10]

Diagnosis: Mixed hyperlipidemia[ICD10: E78.2]

Diagnosis: Chronic kidney disease, stage 1[ICD10: N18.1] Akanksha DRIVER DO [facility] CPT-4: 49286              2016

 

                                        (75642) OFFICE/OUTPATIENT VISIT EST

Diagnosis: Muscle weakness (generalized)[ICD10: M62.81]

Diagnosis: Dizziness and giddiness[ICD10: R42]

Diagnosis: Essential (primary) hypertension[ICD10: I10]

Diagnosis: History of falling[ICD10: Z91.81] Akankshatammy FAY

SHERRY CHEATHAMAramis 

KRISTEL CASE [facility]            CPT-4: 27738              2015

 

                                        ([id number]) OFFICE/OUTPATIENT VISIT EST

Diagnosis: FLU VACCINE[ICD10: Z23] Akanksha Otoolendangela HIGHTWOERLINE OLIVIA KEY [employer] 

[facility]                       CPT-4: 08608              10/16/2015

 

                                        (54261) OFFICE/OUTPATIENT VISIT EST

Diagnosis: Acute renal failure[ICD9: 584.9]

Diagnosis: POLYURIA[ICD9: 788.42] Akanksha KEVINQUELINE SAramis GUERRERO LANCE [employer]                       CPT-4: 71946              09/15/2015

 

                                        (40968) OFFICE/OUTPATIENT VISIT EST

Diagnosis: HYPERLIPIDEMIA NEC/NOS[ICD9: 272.4]

Diagnosis: HYPERTENSION[ICD9: 401.9]

Diagnosis: CAD[ICD9: 414.00]

Diagnosis: Hyperglycemia[ICD9: 790.29]

Diagnosis: MALAISE AND FATIGUE[ICD9: 780.79] Akanksha DRIVER [employer] [facility]            CPT-4: 92002              09/10/2015

 

                                        (68109) OFFICE/OUTPATIENT VISIT EST

Diagnosis: MALAISE AND FATIGUE[ICD9: 780.79]

Diagnosis: HYPOGLYCEMIA[ICD9: 251.2]

Diagnosis: Grieving[ICD9: 309.0]

Diagnosis: ABDOMINAL PAIN[ICD9: 789.00] Akanksha DRIVER

[facility]                    CPT-4: 57563              2015

 

                                        OFFICE/OUTPATIENT VISIT EST

Diagnosis: CERUMEN IMPACTION[ICD9: 380.4]

Diagnosis: EUSTACHIAN TUBE DYSFUNCTION[ICD9: 381.81] Bertha Elieser      

AKANKSHA DRIVER [facility] CPT-4: 91081              2015

 

                                        (37780) OFFICE/OUTPATIENT VISIT EST

Diagnosis: Leg pain[ICD9: 729.5]

Diagnosis: SUPERFIC PHLEBITIS-LEG[ICD9: 451.0] Akanksha DRIVER [facility]            CPT-4: 77170              2015

 

                                        (68828) OFFICE/OUTPATIENT VISIT EST

Diagnosis: Thoracic back pain[ICD9: 724.1]

Diagnosis: SPASM OF MUSCLE[ICD9: 728.85] Akanksha DRIVER [facility]            CPT-4: 62161              2015

 

                                        (68421) OFFICE/OUTPATIENT VISIT EST

Diagnosis: DIZZINESS/VERTIGO[ICD9: 780.4]

Diagnosis: Benign positional vertigo[ICD9: 386.11]

Diagnosis: HYPERTENSION[ICD9: 401.9]

Diagnosis: Suspicious nevus[ICD9: 238.2] Akankshazina DRIVER [facility]            CPT-4: 30293              2015

 

                                        ([id number]) OFFICE/OUTPATIENT VISIT EST

Diagnosis: FLU VACCINE[ICD10: Z23] Akanksha KEY [facility]                       CPT-4: 49269              10/17/2014

 

                                        OFFICE/OUTPATIENT VISIT EST

Diagnosis: SINUSITIS, ACUTE[ICD9: 461.9]

Diagnosis: EUSTACHIAN TUBE DYSFUNCTION[ICD9: 381.81] Bertha DRIVER [facility] CPT-4: 02507              2014

 

                                        (15325) OFFICE/OUTPATIENT VISIT EST

Diagnosis: DIZZINESS/VERTIGO[ICD9: 780.4]

Diagnosis: HYPERTENSION[ICD9: 401.9] Akanksha MEJIA [facility]                       CPT-4: 93535              2014

 

                                        (00365) OFFICE/OUTPATIENT VISIT EST

Diagnosis: HYPERTENSION[ICD9: 401.9]

Diagnosis: MALAISE AND FATIGUE[ICD9: 780.79]

Diagnosis: SINUSITIS, ACUTE[ICD9: 461.9] [facility]            CPT-4: 97912              2014

 

                                        (41169) OFFICE/OUTPATIENT VISIT EST

Diagnosis: HYPERLIPIDEMIA NEC/NOS[ICD9: 272.4]

Diagnosis: HYPERTENSION[ICD9: 401.9]

Diagnosis: B12 DEFIC ANEMIA NEC[ICD9: 281.1]

Diagnosis: HYPOGLYCEMIA[ICD9: 251.2] [facility]                       CPT-4: 90872              2014

 

                                        OFFICE/OUTPATIENT VISIT EST

Diagnosis: COUGH[ICD9: 786.2] Bertha DRIVER [facility] 

CPT-4: 21792                            2014

 

                                        OFFICE/OUTPATIENT VISIT EST

Diagnosis: COUGH[ICD9: 786.2]

Diagnosis: URI, ACUTE[ICD9: 465.9] Bertha KEY [facility]                       CPT-4: 85054              10/15/2013

 

                                        (95553) OFFICE/OUTPATIENT VISIT EST

Diagnosis: HYPERTENSION[ICD9: 401.9]

Diagnosis: CEPHALGIA[ICD9: 784.0]

Diagnosis: ANXIETY STATE NOS[ICD9: 300.00]

Diagnosis: FLU VACCINE[ICD9: V04.81] Akanksha OTOOLE

[facility]                       CPT-4: 39009              2013

 

                                        (65079) OFFICE/OUTPATIENT VISIT EST

Diagnosis: DIZZINESS/VERTIGO[ICD9: 780.4]

Diagnosis: SINUSITIS, ACUTE[ICD9: 461.9]

Diagnosis: Benign positional vertigo[ICD9: 386.11] Akanksha DRIVER [facility]         CPT-4: 80996              2013

 

                                        OFFICE/OUTPATIENT VISIT EST

Diagnosis: OTITIS MEDIA NOS[ICD9: 382.9] Akanksha DRIVER [facility]            CPT-4: 38272              2013

 

                                        OFFICE/OUTPATIENT VISIT EST

Diagnosis: Perforation of ear drum[ICD9: 384.20]

Diagnosis: SINUSITIS, ACUTE[ICD9: 461.9] Akanksha DRIVER [facility]            CPT-4: 32103              05/10/2013

 

                                        (37948) OFFICE/OUTPATIENT VISIT EST

Diagnosis: Mastalgia[ICD9: 611.71] Akanksha KEY [facility]                       CPT-4: 04011              2013

 

                                        (77743) OFFICE/OUTPATIENT VISIT EST

Diagnosis: HYPERLIPIDEMIA NEC/NOS[ICD9: 272.4]

Diagnosis: HYPERTENSION[ICD9: 401.9]

Diagnosis: DIZZINESS/VERTIGO[ICD9: 780.4]

Diagnosis: Hyperglycemia[ICD9: 790.29]

Diagnosis: MALAISE AND FATIGUE[ICD9: 780.79] Akanksha Xiangrodo TEIXEIRA SAramis 

KRISTEL [facility]            CPT-4: 58681              2012

 

                                        (47833) OFFICE/OUTPATIENT VISIT EST

Diagnosis: EUSTACHIAN TUBE DYSFUNCTION[ICD9: 381.81]

Diagnosis: DIZZINESS/VERTIGO[ICD9: 780.4]

Diagnosis: ABDOMINAL PAIN[ICD9: 789.00]

Diagnosis: GERD[ICD9: 530.81]

Diagnosis: CERUMEN IMPACTION[ICD9: 380.4] Akanksha HIGHTOWERLINE S

Aramis 

[facility]            CPT-4: 58918              2012

 

                                        OFFICE/OUTPATIENT VISIT EST

Diagnosis: ABDOMINAL PAIN[ICD9: 789.00]

Diagnosis: DYSPEPSIA[ICD9: 536.8] Akanksha LANCE [facility]                       CPT-4: 70414              10/23/2012

 

                                        (01812) OFFICE/OUTPATIENT VISIT EST

Diagnosis: ABDOMINAL PAIN[ICD9: 789.00]

Diagnosis: DYSPEPSIA[ICD9: 536.8]

Diagnosis: IBS[ICD9: 564.1] Akanksha DRIVER [facility] CPT

-

4: 45755                                10/10/2012

 

                                        OFFICE/OUTPATIENT VISIT EST

Diagnosis: DIZZINESS/VERTIGO[ICD9: 780.4]

Diagnosis: HYPOGLYCEMIA[ICD9: 251.2] Akanksha MEJIA [facility]                       CPT-4: 66644              2012

 

                                        (08889) OFFICE/OUTPATIENT VISIT EST

Diagnosis: URINARY TRACT INFECTION[ICD9: 599.0] Akanksha DRIVER [facility]            CPT-4: 49561              2012

 

                                        (67904) OFFICE/OUTPATIENT VISIT EST

Diagnosis: HYPERLIPIDEMIA NEC/NOS[ICD9: 272.4]

Diagnosis: HYPERTENSION[ICD9: 401.9]

Diagnosis: MALAISE AND FATIGUE[ICD9: 780.79]

Diagnosis: DIZZINESS/VERTIGO[ICD9: 780.4] [facility]            CPT-4: 33350              2012

 

                                        (32976) OFFICE/OUTPATIENT VISIT EST

Diagnosis: DIZZINESS/VERTIGO[ICD9: 780.4]

Diagnosis: MALAISE AND FATIGUE[ICD9: 780.79]

Diagnosis: HYPERTENSION[ICD9: 401.9] Akanksha MEJIA [facility]                       CPT-4: 57057              2012

 

                                        OFFICE/OUTPATIENT VISIT EST

Diagnosis: LUMB/LUMBOSAC DISC DEGEN[ICD9: 722.52]

Diagnosis: Radiculopathy of leg[ICD9: 724.4]

Diagnosis: SACROILIITIS NEC[ICD9: 720.2]

Diagnosis: SPINAL ENTHESOPATHY[ICD9: 720.1] Akanksha DRIVER [facility]            CPT-4: 93188              2012

 

                                        (05190) OFFICE/OUTPATIENT VISIT EST

Diagnosis: PAIN, LOWER BACK[ICD9: 724.2]

Diagnosis: SPASM OF MUSCLE[ICD9: 728.85]

Diagnosis: SCIATICA[ICD9: 724.3]

Diagnosis: Lumbar degenerative disc disease[ICD9: 722.52] Akanksha DRIVER [facility] CPT-4: 18785              2012

 

                                        (04044) OFFICE/OUTPATIENT VISIT EST

Diagnosis: PAIN IN THORACIC SPINE[ICD9: 724.1]

Diagnosis: SPASM OF MUSCLE[ICD9: 728.85] Akanksha DRIVER [facility]            CPT-4: 83356              2012

 

                                        (11620) OFFICE/OUTPATIENT VISIT EST

Diagnosis: PAIN, LOWER BACK[ICD9: 724.2]

Diagnosis: SPASM OF MUSCLE[ICD9: 728.85]

Diagnosis: Sacroiliac dysfunction[ICD9: 739.4]

Diagnosis: Lumbar degenerative disc disease[ICD9: 722.52] Akanksha DRIVER [facility] CPT-4: 66138              2012

 

                                        OFFICE/OUTPATIENT VISIT EST

Diagnosis: MALAISE AND FATIGUE[ICD9: 780.79]

Diagnosis: HYPOTENSION[ICD9: 458.9]

Diagnosis: CAD[ICD9: 414.00] Akanksha DRIVER [facility] 

CPT-4: 84242                            2012

 

                                        OFFICE/OUTPATIENT VISIT EST

Diagnosis: SINUSITIS, ACUTE[ICD9: 461.9]

Diagnosis: PHARYNGITIS, ACUTE[ICD9: 462]

Diagnosis: CERUMEN IMPACTION[ICD9: 380.4] [facility]anksha [facility]            CPT-4: 16095              2011

 

                                        OFFICE/OUTPATIENT VISIT EST

Diagnosis: PAIN IN THORACIC SPINE[ICD9: 724.1]

Diagnosis: GERD[ICD9: 530.81]

Diagnosis: DIZZINESS/VERTIGO[ICD9: 780.4] Akanksha [facility]            CPT-4: 65012              2011

 

                OFFICE/OUTPATIENT VISIT EST Akanksha MEJIA [facility] CPT-

4: 95577                                2011

 

                    (65625) OFFICE/OUTPATIENT VISIT EST Akanksha [facility] 

[facility]                       CPT-4: 08925              2011

 

                                        (22552) OFFICE/OUTPATIENT VISIT, EST

Diagnosis: PAIN, LOWER BACK[ICD9: 724.2] [facility]anksha [facility]            CPT-4: 02646              2011

 

                    (14544) OFFICE/OUTPATIENT VISIT, EST Akanksha DE LA ROSA SAramis OTOOLENDER DO

[facility]                       CPT-4: 82877              2011

 

                    (05563) OFFICE/OUTPATIENT VISIT, EST Akanksha BAKER ORENDER DO

[facility]                       CPT-4: 28343              2010

 

                    ([id number]) OFFICE/OUTPATIENT VISIT, EST Akanksha DE LA ROSA SAramis ORENDER DO

[facility]                       CPT-4: 23515              2010

 

                    (52558) OFFICE/OUTPATIENT VISIT, EST Akanksha DE LA ROSA S. ORENDER DO

[facility]                       CPT-4: 43198              2010

 

                    (93763) OFFICE/OUTPATIENT VISIT, EST Akanksha DE [place] ROSA SAramis OTOOLENDER DO

[facility]                       CPT-4: 02261              2010

 

                    (19778) OFFICE/OUTPATIENT VISIT, EST Akanksha DE LA ROSA S. ORENDER DO

[facility]                       CPT-4: 52036              2010

 

                    (67127) OFFICE/OUTPATIENT VISIT, EST Akanksha OTOOLENDER DO

[facility]                       CPT-4: 26538              2010







Plan of Care





             Planned Activity Notes        Codes        Status       Date

 

                          Visit Diagnosis Plan: Palpitations Discussion: Check C

BC, CMP, TSH

                                        ICD-9 : 785.1

ICD-10 : R00.2

                                                    2020

 

                          Visit Diagnosis Plan: Essential hypertension Discussio

n: Increase metoprolol to 

25mg q AM and 50mg po q pM Recheck 1 week

                                        ICD-9 : 401.9

ICD-10 : I10

                                                    2020

 

                          Patient Education: metoprolol tartrate- [employer] [facility] [id number] 

https://www.APROOFED/samplemd/resources/getResource/61/[email or web address]

                                        Completed           2020

 

                    Care Plan: X-RAY EXAM NECK SPINE 4/5VWS                     

LOINC : 12934-9

                          Pending                   2020

 

                    Care Plan: X-RAY EXAM THORAC SPINE4/>VW                     

LOINC : 69898-7

                          Pending                   2020

 

                                        Appointment: Akanksha Driver

WPtel:[phone]

                                        [address] [place] [address]                                              LAB             2019

 

                                        Appointment: Akanksha Driver

WPtel:[phone]

                                        [address] [place] [address]

[place]                                              INJECTION       10/22/2019

 

             Patient Education: INFLUENZA VACCINE [facility]                           

Completed    10/22/2019

 

                                        Appointment: Akanksha Driver

WPtel:[phone]

                                        [address] [place] [address]                                              LAB             10/11/2019

 

                          Visit Diagnosis Plan: Acute serous otitis media, left 

ear Discussion: instructed

to use flonase and claritin daily for symptom relief. discussed with patient 
that if no improvement in 2 weeks, will need to follow up with ENT for audiology
exam. instructed to call office with any other symptoms such as otalgia, 
dysphagia, fever, etc.

                                        ICD-9 : 381.01

ICD-10 : H65.02

                                                    2019

 

                                        Appointment: Nat Lozoya

                                        [address] [place] [address]                                              ACUTE ILLNESS   2019

 

                          Visit Diagnosis Plan: Urinary tract infection, site no

t specified Discussion: 

Started an old abx prescription

                                        ICD-9 : 599.0

ICD-10 : N39.0

                                                    06/10/2019

 

                          Visit Diagnosis Plan: Hypoglycemia, unspecified Discus

madeleine: Monitor BS At least 

6 small meals a day each with protein

                                        ICD-9 : 251.2

ICD-10 : E16.2

                                                    06/10/2019

 

                          Visit Diagnosis Plan: Essential (primary) hypertension

 Discussion: Stable Check 

CMP

                                        ICD-9 : 401.9

ICD-10 : I10

                                                    06/10/2019

 

                          Visit Diagnosis Plan: Other fatigue Discussion: Check 

CBC, TSH

                                        ICD-9 : 780.79

ICD-10 : R53.83

                                                    06/10/2019

 

                                        Appointment: Akanksha Driver

WPtel:[phone]

                                        [address] [place], [place] [address]

[place]                                              FOLLOW UP       06/10/2019

 

                          Visit Diagnosis Plan: Essential (primary) hypertension

 Discussion: Stable Sees 

Dr. Clements this month

                                        ICD-9 : 401.9

ICD-10 : I10

                                                    2019

 

                          Visit Diagnosis Plan: Urinary tract infection, site no

t specified Discussion: 

Macrobid 100mg po BID for 1 week

                                        ICD-9 : 599.0

ICD-10 : N39.0

                                                    2019

 

                          Visit Diagnosis Plan: Gastro-esophageal reflux disease

 without esophagitis 

Discussion: Stable on omeprazole

Follow Up: 3 months

                                        ICD-9 : 530.81

ICD-10 : K21.9

                                                    2019

 

                                        Appointment: Akanksha Driver

WPtel:[phone]

                                        [address] [place], [place] [id number]

[place]                                              FOLLOW UP       2019

 

                          Patient Education: metoprolol tartrate- [employer] [facility] [id number] 

https://www.APROOFED/Multiply/resources/getResource/61/642s3b84-5gce-93eq-84

                                        Completed           2019

 

                                        Appointment: Akanksha Driver

WPtel:[phone]

                                        [address] [place], [place] [id number]

[place]                                              CANCELED        02/15/2019

 

                          Visit Diagnosis Plan: Gastro-esophageal reflux disease

 without esophagitis 

Discussion: Continue protonix and carafate Proceed with updated EGD

                                        ICD-9 : 530.81

ICD-10 : K21.9

                                                    2018

 

                          Visit Diagnosis Plan: Epigastric pain Discussion: [facility] CT abdomen/pelvis due to

ongoing abdominal pain

                                        ICD-9 : 789.06

ICD-10 : R10.13

                                                    2018

 

                                        Appointment: Akanksha Driver

WPtel:[phone]

                                        [address] [place], [place] [id number]

[place]                                              FOLLOW UP       2018

 

                    Care Plan: CT PELVIS W/O DYE                     LOINC : 361

08-9

                          Pending                   2018

 

                    Care Plan: CT ABDOMEN W/O DYE                     LOINC : 36

103-0

                          Pending                   2018

 

                    Care Plan: Referral Order                     SNOMED-CT : 30

[id number]

                          Pending                   2018

 

                                        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

art disease of native coronary artery 

without angina pectoris                 Discussion: S/P cardiac cath--doing medi

vicki management 

with imdur and metoprolol increased Has fwup with cardiology next week Discussed
cardiac rehab

                                        ICD-9 : 414.00

ICD-10 : I25.10

                                                    2018

 

                          Visit Diagnosis Plan: Generalized anxiety disorder Dis

cussion: Stable

                                        ICD-9 : 300.00

ICD-10 : F41.1

                                                    2018

 

                          Visit Diagnosis Plan: Gastro-esophageal reflux disease

 without esophagitis 

Discussion: Continue protonix at BID dosing and carafate BID

Follow Up: 2 months

                                        ICD-9 : 530.81

ICD-10 : K21.9

                                                    2018

 

                          Visit Diagnosis Plan: Essential (primary) hypertension

 Discussion: Stable

                                        ICD-9 : 401.9

ICD-10 : I10

                                                    2018

 

                                        Appointment: Akanksha Driver

WPtel:[phone]

                                        [address] [place], [place] [address]

[place]                                              FOLLOW UP       2018

 

                          Visit Diagnosis Plan: Gastro-esophageal reflux disease

 without esophagitis 

Discussion: Continue protonix at BID dosing Continue carafate at q AC and HS 
dosing for 2 more weeks then decrease to BID dosing

Follow Up: 4 weeks

                                        ICD-9 : 530.81

ICD-10 : K21.9

                                                    10/02/2018

 

                          Visit Diagnosis Plan: Urinary tract infection, site no

t specified Discussion: 

Reculture urine

                                        ICD-9 : 599.0

ICD-10 : N39.0

                                                    10/02/2018

 

                          Visit Diagnosis Plan: Generalized anxiety disorder Dis

cussion: Increase xanax to

BID dosing especially with upcoming cardiac testing which is already making 
patient more anxious and worried

                                        ICD-9 : 300.00

ICD-10 : F41.1

                                                    10/02/2018

 

                          Visit Diagnosis Plan: Palpitations Discussion: Cardilo

logy going to schedule 

Lexiscan

                                        ICD-9 : 785.1

ICD-10 : R00.2

                                                    10/02/2018

 

                                        Appointment: Akanksha Driver

WPtel:[phone] [address] [facility]

[place]                                              FOLLOW UP       10/02/2018

 

             Patient Education: Patient Medication Summary                      

     Completed    10/02/2018

 

                                        Appointment: Akanksha Driver

WPtel:[phone]

                                        [address] [facility]

[place]                                              LAB             2018

 

             Patient Education: Patient Medication Summary                      

     Completed    2018

 

                          Visit Diagnosis Plan: Epigastric pain Discussion: Cont

inue protonix and carafate

but make into a slurry Flu shot given Discussed EGD if symptoms persist

Follow Up: 2 weeks

                                        ICD-9 : 789.06

ICD-10 : R10.13

                                                    2018

 

                          Visit Diagnosis Plan: Generalized anxiety disorder Dis

cussion: Did discuss 

increased risk of benzodiazepines causing dementia but at this time will stay at
xanax 0.25mg po BID

                                        ICD-9 : 300.00

ICD-10 : F41.1

                                                    2018

 

                                        Appointment: Akanksha Driver

WPtel:[phone]

                                        [facility]

                                              FOLLOW UP       2018

 

             Patient Education: Patient Medication Summary                      

     Completed    2018

 

                          Visit Diagnosis Plan: Essential (primary) hypertension

 Discussion: Has 

hydralazine to use prn per cardiology Going to do 30 day holter monitor

                                        ICD-9 : 401.9

ICD-10 : I10

                                                    2018

 

                          Visit Diagnosis Plan: Hypoglycemia, unspecified Discus

madeleine: 6 small meals a 

day--each with protein Increase fluid intake

                                        ICD-9 : 251.2

ICD-10 : E16.2

                                                    2018

 

                          Visit Diagnosis Plan: Gastro-esophageal reflux disease

 without esophagitis 

Discussion: Change to protonix 40mg po BID

Follow Up: 1 months

                                        ICD-9 : 530.81

ICD-10 : K21.9

                                                    2018

 

                                        Appointment: Akanksha Driver

WPtel:[phone] [address] [facility]

                                              ACUTE ILLNESS   2018

 

             Patient Education: Patient Medication Summary                      

     Completed    2018

 

                          Visit Diagnosis Plan: Dizziness and giddiness Discussi

on: blood work to be 

completed in office including cmp, cbc, troponin to rule out glucose 
intolerance, infection, dehydration. instructed patient that she needs to see 
her cardiologist sooner than oct and patient requested we contact dr. clements's 
office. will have front office make appt. patient has carotid doppler annually.

                                        ICD-9 : 780.4

ICD-10 : R42

                                                    2018

 

                                        Appointment: Nat Lozoya

                                        [address] [facility]

                                              ACUTE ILLNESS   2018

 

             Patient Education: Patient Medication Summary                      

     Completed    2018

 

                          Visit Diagnosis Plan: Cervicalgia Discussion: Complete

 course of PT since 

symptoms improved Continue stretching exercises Notify if symptoms return or 
worsen

                                        ICD-9 : 723.1

ICD-10 : M54.2

                                                    2018

 

                                        Appointment: Akanksha Driver

WPtel:[phone]

                                        [address] [place], [place] [id number]

                                              FOLLOW UP       2018

 

             Patient Education: Patient Medication Summary                      

     Completed    2018

 

                          Visit Diagnosis Plan: Hypoglycemia, unspecified Discus

madeleine: Eat something with 

protein every 2 hrs

                                        ICD-9 : 251.2

ICD-10 : E16.2

                                                    2018

 

                          Visit Diagnosis Plan: Other spondylosis with radiculop

athy, cervical region 

Discussion: Check MRI of cervical spine

                                        ICD-9 : 721.0

ICD-10 : M47.22

                                                    2018

 

                          Visit Diagnosis Plan: Vertigo of central origin, bilat

eral Discussion: Discussed

PT for vestibular therapy

                                        ICD-9 : 386.2

ICD-10 : H81.43

                                                    2018

 

                                        Appointment: Akanksha Driver

WPtel:[phone]

                                        [facility]

                                                              2018

 

             Patient Education: Patient Medication Summary                      

     Completed    2018

 

                    Care Plan: MRI NECK SPINE W/O DYE                     LOINC 

: 54417-0

                          Pending                   2018

 

                          Visit Diagnosis Plan: Otalgia, bilateral Discussion: k

enalog 40 mg given to 

patient im. instructed patient to monitor blood sugar at home due to risk of 
increased sugar. instructed patient to rtc on wednesday if no improvement and 
may require antibiotic.

                                        ICD-9 : 388.70

ICD-10 : H92.03

                                                    2018

 

                          Visit Diagnosis Plan: Benign paroxysmal vertigo, bilat

eral Discussion: patient 

has meclizine at home but states it makes her too tired. instructed patient to 
cut in half and take at night. if it doesn't cause too much drowsiness, 
instructed to take bid until feeling better. instructed to rtc wed if no 
improvement or worsening symptoms. instructed patient to increase fluid intake 
as well.

                                        ICD-9 : 386.11

ICD-10 : H81.13

                                                    2018

 

                                        Appointment: Nat Lozoya

                                        [address] [place] [address]                                              ACUTE ILLNESS   2018

 

             Patient Education: Patient Medication Summary                      

     Completed    2018

 

                          Visit Diagnosis Plan: Left lower quadrant pain Discuss

ion: Culture urine Notify 

if worsens

                                        ICD-9 : 789.04

ICD-10 : R10.32

                                                    2018

 

                          Visit Diagnosis Plan: Low back pain Discussion: Stretc

hes Topical aspercreme 

Tylenol 1 po TID

                                        ICD-9 : 724.2

ICD-10 : M54.5

                                                    2018

 

                          Visit Diagnosis Plan: Radiculopathy, lumbosacral regio

n Discussion: As above

                                        ICD-9 : 724.4

ICD-10 : M54.17

                                                    2018

 

                                        Appointment: Akanksha Driver

WPtel:[phone]

                                        [address] [place] [address]                                              ACUTE ILLNESS   2018

 

             Patient Education: Patient Medication Summary                      

     Completed    2018

 

                                        Appointment: Akanksha Driver

WPtel:[phone]

                                        [address] [place] [address]

[place]                                              INJECTION       10/06/2017

 

             Patient Education: Patient Medication Summary                      

     Completed    10/06/2017

 

                                        Appointment: Akanksha Driver

WPtel:[phone]

                                        [address] [place] [address]                                              LAB             2017

 

             Patient Education: Patient Medication Summary                      

     Completed    2017

 

                          Visit Diagnosis Plan: Pain in thoracic spine Discussio

n: PT has helped Continue 

stretches and walking [facility]

                                        ICD-9 : 724.1

ICD-10 : M54.6

                                                    2017

 

                          Visit Diagnosis Plan: Other intervertebral disc degene

ration, lumbar region 

Follow Up: 3 months

                                        ICD-9 : 722.52

ICD-10 : M51.36

                                                    2017

 

                                        Appointment: Akanksha Driver

WPtel:[phone]

                                        [address] [place] [address]                                              FOLLOW UP       2017

 

             Patient Education: Patient Medication Summary                      

     Completed    2017

 

                          Visit Diagnosis Plan: Low back pain Discussion: Start 

PT for low back- Seems 

like abdominal pain is radiating from low back

Follow Up: 5 weeks

                                        ICD-9 : 724.2

ICD-10 : M54.5

                                                    2017

 

                          Visit Diagnosis Plan: Left lower quadrant pain Discuss

ion: Had CT scan of 

abdomen/pelvis in 2017 and normal colonoscopy in 2015

                                        ICD-9 : 789.04

ICD-10 : R10.32

                                                    2017

 

                                        Appointment: Akanksha Driver

WPtel:[phone]

                                        [address] [place], [place] [id number]

                                              ACUTE ILLNESS   2017

 

             Patient Education: Patient Medication Summary                      

     Completed    2017

 

                                        Appointment: Akanksha Driver

WPtel:[phone]

                                        [address] [place], [place] [id number]

[place]                                              LAB             2017

 

             Patient Education: Patient Medication Summary                      

     Completed    2017

 

                          Visit Diagnosis Plan: Mixed hyperlipidemia Discussion:

 Check lipids

                                        ICD-9 : 272.4

ICD-10 : E78.2

                                                    2017

 

                          Visit Diagnosis Plan: Impaired fasting glucose Discuss

ion: Return in AM for CMP,

HbA1C Accuchecks daily Diet/Exercise discussed at length

                                        ICD-9 : 790.29

ICD-10 : R73.01

                                                    2017

 

                          Visit Diagnosis Plan: Other fatigue Discussion: Check 

CBC, TSH

                                        ICD-9 : 780.79

ICD-10 : R53.83

                                                    2017

 

                          Visit Diagnosis Plan: Mastodynia Discussion: Check Jace

ateral diagnostic 

mammogram with US

                                        ICD-9 : 611.71

ICD-10 : N64.4

                                                    2017

 

                                        Appointment: Akanksha Driver

WPtel:[phone]

                                        [facility]

[place]              3/7 confirmed `sl                 ACUTE ILLNESS   2017

 

             Patient Education: Patient Medication Summary                      

     Completed    2017

 

                    Care Plan: MAMMOGRAM SCREENING                     LOINC : 2

[id number]

                          Pending                   2017

 

                          Visit Diagnosis Plan: Acute upper respiratory infectio

n, unspecified Discussion:

Exam is reassuring Likely viral illness Supportive care reviewed and encouraged 
Follow up PRN

                                        ICD-9 : 465.9

ICD-10 : J06.9

                                                    2017

 

                                        Appointment: Aicha Stevensonfany 

                                        [address] [place], [place] [address]                                              ACUTE ILLNESS   2017

 

             Patient Education: Patient Medication Summary                      

     Completed    2017

 

                          Visit Plan:               Supportive care. Rest, Fluid

s, Tylenol/Motrin prn fever or 

bodyaches. Notify if worsening symptoms.

                                                            2016

 

                                        Appointment: Akanksha Driver

WPtel:[phone]

                                        [address] [place] [address]                                              ACUTE ILLNESS   2016

 

             Patient Education: Patient Medication Summary                      

     Completed    2016

 

                                        Appointment: Akanksha Driver

WPtel:[phone]

                                        [address] [place] [address]                                              INJECTION       10/07/2016

 

             Patient Education: Patient Medication Summary                      

     Completed    10/07/2016

 

                                        Appointment: Akanksha Driver

WPtel:[phone]

                                        [address] [place] [address]                                              LAB             2016

 

             Patient Education: Patient Medication Summary                      

     Completed    2016

 

                          Visit Plan:               Add miralax daily Use daily 

probiotic and metamucil and push fluids

Notify if worsens/persists

                                                            2016

 

                                        Appointment: Akanksha Driver

WPtel:[phone]

                                        [address] [place] [address]              16 confirmed ~sl                 ACUTE ILLNESS   2016

 

             Patient Education: Patient Medication Summary                      

     Completed    2016

 

                          Visit Plan:               No NSAIDs Kidney function di

scussed Discussed hydration Monitor lab

every 4months so will check CMP, HbA1C next month

                                                            2016

 

                                        Appointment: Akanksha Driver

WPtel:[phone]

                                        [address] [place] [address]              03/15 confirmed ~sl                 ACUTE ILLNESS   2016

 

             Patient Education: Patient Medication Summary                      

     Completed    2016

 

                          Visit Plan:               Vestibular exercises Refill 

flonase Strict low Na diet--discussed 

that needs to read labels Rx for walker with chair given due to muscle 
weakness/unsteadiness Has carotids and abdominal aorta and legs checked in 
January Check Chem 7

                                                            2015

 

                                        Appointment: Akanksha Driver

WPtel:[phone]

                                        [address] [place], [place] [id number]

              12/15/15 appt confirmed cn                 FOLLOW UP       

 

             Patient Education: Patient Medication Summary                      

     Completed    2015

 

             Patient Education: Vertigo                           Completed    2015

 

                                        Appointment: Akanksha Driver

WPtel:[phone]

                                        [address] [place], [place] [id number]                                              INJECTION       10/16/2015

 

             Patient Education: Patient Medication Summary                      

     Completed    10/16/2015

 

                                        Appointment: Akanksha Driver

WPtel:[phone]

                                        [address] [place], [place] [address]                                              UA              09/15/2015

 

             Patient Education: Patient Medication Summary                      

     Completed    09/15/2015

 

                                        Referral: Landon De La Torre

WPtel:[phone]

[address]              Referral                        Completed       2015

 

                                        Appointment: Akanksha Driver

WPtel:[phone]

                                        [address] [place], [place] [address]                                              LAB             09/10/2015

 

             Patient Education: Patient Medication Summary                      

     Completed    09/10/2015

 

                          Visit Plan:               Check CMP, CBC, TSH, Free T4

, B12, Lipids, HbA1C Discussed 6 small 

meals a day each with protein Would likely benefit from antidepressant Update 
colonoscopy

                                                            2015

 

                                        Appointment: Akanksha Driver

WPtel:[phone]

                                        [address] [place], [place] [id number]              9/8/15 confirmed                 ACUTE ILLNESS   2015

 

             Patient Education: Patient Medication Summary                      

     Completed    2015

 

                          Visit Plan:               Cerumen flush with warm wate

r and peroxide - good results Resume 

Nasonex nasal spray daily Recommended Debrox earwax removal drops

                                                            2015

 

                                        Appointment: Bertha Mon

WPtel:[phone]

                                        [address] [place] [address]                                              ACUTE ILLNESS   2015

 

             Patient Education: Patient Medication Summary                      

     Completed    2015

 

                          Visit Plan:               Continue aspirin daily Add m

eloxicam for 1week Elevate and Ice Call

on 2015

 

                                        Appointment: Akanksha Driver

WPtel:[phone]

                                        [address] [place], [place] [address]                                              ACUTE ILLNESS   2015

 

             Patient Education: Patient Medication Summary                      

     Completed    2015

 

                          Visit Plan:               Been using baclofen at [facility] and has helped some PT for next 

2-4weeks

                                                            2015

 

                                        Appointment: Akanksha Driver

WPtel:[phone]

                                        [address] [place], [place] [address] [place] [address] Follow Up 2015

 

             Patient Education: Patient Medication Summary                      

     Completed    2015

 

                                        Appointment: Akanksha Driver

WPtel:[phone]

                                        [address] [place], [place] [address]                                              LAB             2015

 

                                        Appointment: Akanksha Driver

WPtel:[phone]

                                        [address] [place] [address]              feeling better, weather -                 FOLLOW UP       

 

                                        Referral: Landon De La Torre

WPtel:[phone]

[facility] [address]              Referral                        Appointment Requested 2015

 

                          Visit Plan:               Continue exercise and curren

t meds See surgery for removal of right

arm lesion

                                                            2015

 

                                        Appointment: Akanksha Driver

WPtel:[phone]

                                        [address] [place], [place] [id number]

                                              FOLLOW UP       2015

 

             Patient Education: Patient Medication Summary                      

     Completed    2015

 

                                        Appointment: Akanksha Driver

WPtel:[phone]

                                        [address] [place], [place] [id number]

[place]                                              INJECTION       10/17/2014

 

             Patient Education: Patient Medication Summary                      

     Completed    10/17/2014

 

                                        Appointment: Bertha Mon

WPtel:[phone]

                                        [address] [place], [place] [id number]                                              ACUTE ILLNESS   2014

 

             Patient Education: Patient Medication Summary                      

     Completed    2014

 

                          Visit Plan:               Discussed that likely lumbar

 etiology for leg weakness Will change 

amlodopine to low dose dyazide and see if helps legs and inner ear

                                                            2014

 

                                        Appointment: Akanksha Driver

WPtel:[phone]

                                        [address] [place] [address]                                              FOLLOW UP       2014

 

             Patient Education: Patient Medication Summary                      

     Completed    2014

 

                          Visit Plan:               Ceftin and continue claritin

/flonase Decrease amlodopine to 2.5mg q

HS until fwup with Card due to weakness

                                                            2014

 

                                        Appointment: Akanksha Driver

WPtel:[phone]

                                        [address] [place] [address]                                              ACUTE ILLNESS   2014

 

             Patient Education: Patient Medication Summary                      

     Completed    2014

 

                                        Appointment: Akanksha Driver

WPtel:[phone]

                                        [address] [place], [place] [id number]                                              LAB             2014

 

             Patient Education: Patient Medication Summary                      

     Completed    2014

 

                                        Appointment: Bertha Mon

WPtel:[phone]

                                        [address] [place] [address]                                              ACUTE ILLNESS   2014

 

             Patient Education: Patient Medication Summary                      

     Completed    2014

 

                          Visit Plan:               Supportive care. Rest, Fluid

s, Tylenol prn fever or bodyaches. 

Notify if worsening symptoms. Ceftin

                                                            10/15/2013

 

                                        Appointment: Bertha Mon

WPtel:[phone]

                                        [address] [place] [address]                                              ACUTE ILLNESS   10/15/2013

 

             Patient Education: Patient Medication Summary                      

     Completed    10/15/2013

 

                                        Appointment: Akanksha Driver

WPtel:[phone]

                                        [address] [place] [address]                                              BP CHECK        2013

 

             Patient Education: Patient Medication Summary                      

     Completed    2013

 

                                        Appointment: Akanksha Driver

WPtel:[phone]

                                        [address] [place], [place] [id number]

                                              ER Follow UP    2013

 

             Patient Education: Patient Medication Summary                      

     Completed    2013

 

                          Visit Plan:               Restart flonase BID Use mecl

izine 25mg q HS Vestibular exercises 

Claritin 10mg q AM See ENT if doesn't resolve

                                                            2013

 

                                        Appointment: Akanksha Driver

WPtel:[phone]

                                        [address] [place] [address]                                              ACUTE ILLNESS   2013

 

             Patient Education: Patient Medication Summary                      

     Completed    2013

 

                          Visit Plan:               Will continue to observe

                                                            2013

 

                                        Appointment: Akanksha Driver

WPtel:[phone]

                                        [address] [place], [place] [id number]

                                              FOLLOW UP       2013

 

             Patient Education: Patient Medication Summary                      

     Completed    2013

 

                          Visit Plan:               ERx for Augmentin 875mg q12 

x 10 days and Floxin otic drops 5 gtts 

AD x7days Sample of Nasonex per pt request Discussed treatment (nasal saline, 
salt water gargles, keeping right ear clean and dry, for worsening go to UC on 
weekend, Tylenol/ibuprofen, etc.) RTC 2 weeks for ear recheck.

                                                            05/10/2013

 

                                        Appointment: Jill Jean 

WPtel:[phone]

                                        [address] [place] [address]                                              ACUTE ILLNESS   05/10/2013

 

             Patient Education: Patient Medication Summary                      

     Completed    05/10/2013

 

                          Visit Plan:               Decrease caffeine intake Marina

ck Bilateral Mammogram with US of left 

breast

                                                            2013

 

                                        Appointment: Akanksha Driver

WPtel:[phone]

                                        [address] [place] [address]                                              ACUTE ILLNESS   2013

 

             Patient Education: Patient Medication Summary                      

     Completed    2013

 

                                        Appointment: Kate Hall

WPtel:[phone]

                                        [address] [place], [place] [id number]

[place]              appt scheduled                  ACUTE ILLNESS   2013

 

                                        Appointment: Akanksha Driver

WPtel:[phone]

                                        [address] [place], [place] [id number]

[place]                                              LAB             2012

 

             Patient Education: Patient Medication Summary                      

     Completed    2012

 

                          Visit Plan:               Continue off carafate and se

e how does Continue probiotic Add 

Vestibular exercises and use meclizine prn Continue Nasonex Check fasting lab 
including CMP, Lipids, CBC, TSH, Free T4, HbA1C

                                                            2012

 

                                        Appointment: Akanksha Drivertel:[phone]

                                        [address] [place], [place] [id number]

                                              FOLLOW UP       2012

 

             Patient Education: Patient Medication Summary                      

     Completed    2012

 

                          Visit Plan:               Continue carafate for 4more 

weeks at current dose then decrease to 

BID for 2wks then q HS

                                                            10/23/2012

 

                                        Appointment: Akanksha Driver

WPtel:[phone]

                                        [address] [place], [place] [id number]

                                              FOLLOW UP       10/23/2012

 

             Patient Education: Patient Medication Summary                      

     Completed    10/23/2012

 

                          Visit Plan:               Continue omeprazole Add Ellyn

fate 1gm po q AC Add daily probiotic

                                                            10/10/2012

 

                                        Appointment: Akanksha Driver

WPtel:[phone]

                                        [address] [place], [place] [address]                                              ACUTE ILLNESS   10/10/2012

 

             Patient Education: Patient Medication Summary                      

     Completed    10/10/2012

 

                                        Appointment: Akanksha Drivertel:[phone]

                                        [address] [place], [place] [id number]

[place]                                              INJECTION       2012

 

             Patient Education: Patient Medication Summary                      

     Completed    2012

 

                          Visit Plan:               Diabetic Diet Accuchecks BID

 alternating times Hold onglyza and 

Januvia for now and continue diet and exercise and weight loss and moniter BS 
Discussed hypoglycemia and snack of peanut butter and crackers with juice or 
milk

                                                            2012

 

                                        Appointment: Akanksha Drivertel:[phone]

                                        [address] [place], [place] [id number]

                                              WORK IN         2012

 

             Patient Education: Patient Medication Summary                      

     Completed    2012

 

                                        Appointment: Akanksha Drivertel:[phone]

                                        [address] [place], [place] [id number]

                                              UA              2012

 

             Patient Education: Patient Medication Summary                      

     Completed    2012

 

                                        Appointment: Akanksha Drivertel:[phone]

                                        [address] [place], [place] [id number]

[place]                                              LAB             2012

 

             Patient Education: Patient Medication Summary                      

     Completed    2012

 

                                        Appointment: Akanksha Driver

WPtel:[phone]

                                        [address] [place] [address]                                              ACUTE ILLNESS   2012

 

             Patient Education: Patient Medication Summary                      

     Completed    2012

 

                          Visit Plan:               Injection to Right SI joint 

as above Pt will call in 3 days on pain

Has PT starting in 2wks.

                                                            2012

 

                                        Appointment: Akanksha Driver

WPtel:[phone]

                                        [address] [place] [address]                                              ACUTE ILLNESS   2012

 

             Patient Education: Patient Medication Summary                      

     Completed    2012

 

                          Visit Plan:               OMT done Start PT May need u

pdated MRI if need to consider epidural

Prednisone

                                                            2012

 

                                        Appointment: Akanksha Driver

WPtel:[phone]

                                        [address] [place] [address]                                              OMT             2012

 

             Patient Education: Patient Medication Summary                      

     Completed    2012

 

                          Visit Plan:               Flexeril and Vimovo OMT done

 Daily stretches

                                                            2012

 

                                        Appointment: Akanksha Driver

WPtel:[phone]

                                        [address] [place] [address]                                              ACUTE ILLNESS   2012

 

             Patient Education: Patient Medication Summary                      

     Completed    2012

 

                          Visit Plan:               OMT done Daily stretches Vinod

st heat or biofreeze Right SI joint 

injection Call in 1week Vimovo BID

                                                            2012

 

                                        Appointment: Akanksha Driver

WPtel:[phone]

                                        [address] [place] [address]                                              ACUTE ILLNESS   2012

 

             Patient Education: Patient Medication Summary                      

     Completed    2012

 

                                        Appointment: Akanksha Driver

WPtel:[phone]

                                        [address] [place] [address]

[place]                                              LAB             2012

 

             Patient Education: Patient Medication Summary                      

     Completed    2012

 

                          Visit Plan:               Decrease amlodopine to 1.25m

g daily Schedule with Cardiology 

Fasting lab in AM

                                                            2012

 

                                        Appointment: Akanksha Driver

WPtel:[phone]

                                        [address] [place] [address]                                              ACUTE ILLNESS   2012

 

             Patient Education: Patient Medication Summary                      

     Completed    2012

 

                          Visit Plan:               Saline nasal flushes prn. Ty

lenol/Motrin prn headache. Notify if 

persists/symptoms worsening. Cerumen removal from ears as above

                                                            2011

 

                                        Appointment: Akanksha Driver

WPtel:[phone]

                                        [address] [place] [address]                                              ACUTE ILLNESS   2011

 

             Patient Education: Patient Medication Summary                      

     Completed    2011

 

                                        Appointment: Akanksha Driver

WPtel:[phone]

                                        [address] [place] [address]                                              INJECTION       10/05/2011

 

             Patient Education: Patient Medication Summary                      

     Completed    10/05/2011

 

                          Visit Plan:               Continue vimovo for 1more we

ek Increase Omeprazole to BID for 1mo 

then resume QD if stomach improved Vestibular exercises with meclizine q HS for 
next week

                                                            2011

 

                                        Appointment: Akanksha Driver

WPtel:[phone]

                                        [address] [place] [address]                                              FOLLOW UP       2011

 

             Patient Education: Patient Medication Summary                      

     Completed    2011

 

                                        Appointment: Akanksha Driver

WPtel:[phone]

                                        [address] [place] [address]                                              ACUTE ILLNESS   2011

 

             Patient Education: Patient Medication Summary                      

     Completed    2011

 

                                        Appointment: Akanksha Driver

WPtel:[phone]

                                        [address] [place] [address]                                              LAB             2011

 

             Patient Education: Patient Medication Summary                      

     Completed    2011

 

                          Visit Plan:               Saline nasal flushes prn. Ty

lenol/Motrin prn headache. Notify if 

persists/symptoms worsening. Add Veramyst Increase Omeprazole to 20mg po BID

                                                            2011

 

                                        Appointment: Akanksha Driver

WPtel:[phone]

                                        [address] [place] [address]                                              ACUTE ILLNESS   2011

 

             Patient Education: Patient Medication Summary                      

     Completed    2011

 

                                        Appointment: Akanksha Driver

WPtel:[phone]

                                        [address] [place] [address]                                              FOLLOW UP       2011

 

             Patient Education: Patient Medication Summary                      

     Completed    2011

 

                                        Appointment: Akanksha Driver

WPtel:[phone]

                                        [address] [place] [address]                                              ACUTE ILLNESS   2011

 

             Patient Education: Patient Medication Summary                      

     Completed    2011

 

                          Visit Plan:               Nasocourt sample given. Pt. 

will notify if symptoms are worse on 

Monday.

                                                            2010

 

                                        Appointment: Kate Hall

WPtel:[phone]

                                        [address] [place] [address]                                              ACUTE ILLNESS   2010

 

             Patient Education: Patient Medication Summary                      

     Completed    2010

 

                                        Appointment: Akanksha Driver

WPtel:[phone]

                                        [address] [place] [address]

[place]                                              INJECTION       10/06/2010

 

             Patient Education: Patient Medication Summary                      

     Completed    10/06/2010

 

                                        Appointment: Akanksha Driver

WPtel:[phone]

                                        [address] [place] [address]                                              FOLLOW UP       2010

 

             Patient Education: Patient Medication Summary                      

     Completed    2010

 

             Patient Education: Lexapro                           Completed    0

2010

 

                          Visit Plan:               May proceed with hiatal mykel

ia repair per Card. so will contact Dr. Cash's office to proceed with surgery Trial of Lexapro 5mg QD plus use 
xanax prn

                                                            2010

 

                                        Appointment: Akanksha Driver

WPtel:[phone]

                                        [address] [place] [address]                                              FOLLOW UP       2010

 

             Patient Education: Patient Medication Summary                      

     Completed    2010

 

                          Visit Plan:               B12 given Cont oral B12 and 

iron Fwup 1mo for B12 Proceed with 

hiatal hernia repair once card clearance

                                                            2010

 

                                        Appointment: Akanksha Driver

WPtel:[phone] [address] [place], [place] [address]

[place]                                              FOLLOW UP       2010

 

             Patient Education: Patient Medication Summary                      

     Completed    2010

 

                                        Appointment: Akanksha Driver

WPtel:[phone]

                                        [address] [place], [place] [id number]

[place]                                              FOLLOW UP       2010

 

             Patient Education: Patient Medication Summary                      

     Completed    2010

 

                                        Appointment: Akanksha Driver

WPtel:[phone]

                                        [address] [place], [place] [id number]

[place]                                              LAB             2010

 

             Patient Education: Patient Medication Summary                      

     Completed    2010

 

                          Visit Plan:               Check fasting lab in AM--CMP

,Lipids, CBC, Vit D, TSH,FreeT4, B12

                                                            2010

 

                                        Appointment: Akanksha Driver

WPtel:[phone]

                                        [address] [place], [place] [id number]

                                              FOLLOW UP       2010

 

             Patient Education: Patient Medication Summary                      

     Completed    2010

 

                                        Referral: Landon De La Torre

WPtel:[phone]

#1 [facility]66[facility]              Referral                        Appointment Requested  

 

                                        Referral: Landon De La Torre

WPtel:[phone]

#[address]              Referral                        Initiated        







Instructions





                                        Comment

 

                                        . Supportive care.  Rest, Fluids, Tyleno

l/Motrin prn fever or bodyaches.  Notify

if worsening symptoms.



 

                                        . Add miralax daily

Use daily probiotic and metamucil and push fluids

Notify if worsens/persists

 

                                        . No NSAIDs

Kidney function discussed

Discussed hydration 

Monitor lab every 4months so will check CMP, HbA1C next month

 

                                        . Vestibular exercises

Refill flonase

Strict low Na diet--discussed that needs to read labels

Rx for walker with chair given due to muscle weakness/unsteadiness

Has carotids and abdominal aorta and legs checked in January

Check Chem 7



 

                                        . Check CMP, CBC, TSH, Free T4, B12, Lip

ids, HbA1C

Discussed 6 small meals a day each with protein

Would likely benefit from antidepressant 

Update colonoscopy

 

                                        . Cerumen flush with warm water and tyler

xide - good results 

Resume Nasonex nasal spray daily

Recommended Debrox earwax removal drops 

 

                                        . Continue aspirin daily

Add meloxicam for 1week

Elevate and Ice

Call on Monday

 

                                        . Been using baclofen at bedtime and has

 helped some

PT for next 2-4weeks



 

                                        . Continue exercise and current meds

See surgery for removal of right arm lesion

 

                                        . Discussed that likely lumbar etiology 

for leg weakness

Will change amlodopine to low dose dyazide and see if helps legs and inner ear

 

                                        . Ceftin and continue claritin/flonase

Decrease amlodopine to 2.5mg q HS until fwup with Card due to weakness

 

                                        .  Supportive care.  Rest, Fluids, Tylen

ol prn fever or bodyaches.  Notify if 

worsening symptoms.

Ceftin

 

                                        . Restart flonase BID

Use meclizine 25mg q HS

Vestibular exercises

Claritin 10mg q AM

See ENT if doesn't resolve

 

                                        . Will continue to observe



 

                                        . ERx for Augmentin 875mg q12 x 10 days 

and Floxin otic drops 5 gtts AD x7days

Sample of Nasonex per pt request

Discussed treatment (nasal saline, salt water gargles, keeping right ear clean 
and dry, for worsening go to UC on weekend, Tylenol/ibuprofen, etc.)

RTC 2 weeks for ear recheck.

 

                                        . Decrease caffeine intake

Check Bilateral Mammogram with US of left breast

 

                                        Left ear flushed with warm water and per

oxide with ear syringe and then last 

removed with currette--peroxide drops instilled.  Tolerated well, no 
complications, TM intact.. Continue off carafate and see how does

Continue probiotic

Add Vestibular exercises and use meclizine prn

Continue Nasonex

Check fasting lab including CMP, Lipids, CBC, TSH, Free T4, HbA1C

 

                                        . Continue carafate for 4more weeks at c

urrent dose then decrease to BID for 

2wks then q HS

 

                                        . Continue omeprazole

Add Carafate 1gm po q AC

Add daily probiotic



 

                                        . Diabetic Diet

Accuchecks BID alternating times

Hold onglyza and Januvia for now and continue diet and exercise and weight loss 
and moniter BS

Discussed hypoglycemia and snack of peanut butter and crackers with juice or 
milk

 

                                        Informed Consent obtainded.  Right SI yasir

int cleansed with alcohol and betadine 

and injected with 3cc 1%l lidocaine with 40mg depomedrol and 40mg kenalog, 
tolerated well with no complications, neosporin and bandage applied. Injection 
to Right SI joint as above

Pt will call in 3 days on pain

Has PT starting in 2wks.

 

                                        . OMT done

Start PT

May need updated MRI if need to consider epidural

Prednisone

 

                                        . Flexeril and Vimovo

OMT done

Daily stretches

 

                                        Right SI joint cleansed with alchohol an

d betadine and injected with 3cc 1% 

lidocaine with 40mg depomedrol and 40mg kenalog, tolerated well with no 
complications, neosporin and bandage applied. OMT done

Daily stretches

Moist heat or biofreeze

Right SI joint injection

Call in 1week

Vimovo BID

 

                                        . Decrease amlodopine to 1.25mg daily

Schedule with Cardiology

Fasting lab in AM

 

                                        .  Saline nasal flushes prn. Tylenol/Mot

rin prn headache.  Notify if 

persists/symptoms worsening.

Cerumen removal from ears as above



 

                                        . Continue vimovo for 1more week

Increase Omeprazole to BID for 1mo then resume QD if stomach improved

Vestibular exercises with meclizine q HS for next week

 

                                        . Saline nasal flushes prn. Tylenol/Motr

in prn headache.  Notify if 

persists/symptoms worsening.

Add Veramyst

Increase Omeprazole to 20mg po BID

 

                                        . Nasocourt sample given.  Pt. will noti

fy if symptoms are worse on Monday. 

 

                                        . May proceed with hiatal hernia repair 

per Card. so will contact Dr. Cash's office to proceed with surgery



Trial of Lexapro 5mg QD plus use xanax prn

 

                                        . B12 given

Cont oral B12 and iron

Fwup 1mo for B12

Proceed with hiatal hernia repair once card clearance

 

                                        . Check fasting lab in AM--CMP,Lipids, C

BC, Vit D, TSH,FreeT4, B12







Medical Equipment

No Medical Equipment data



Health Concerns Section

Health Concerns data not found



Goals Section

Goals data not found



Interventions Section

Interventions data not found



Health Status Evaluations/Outcomes Section

Health Status Evaluations/Outcomes data not found



Advance Directives

No Advance Directive data

## 2020-02-19 NOTE — XMS REPORT
CCD document using C-CDA

                             Created on: 2020



Leilani Ramírez

External Reference #: [id number]

: 1940

Sex: Female



Demographics





                          Address                   [address] [place]  [address]

 

                          Home Phone                [phone]

 

                          Preferred Language        Unknown

 

                          Marital Status            

 

                          Restoration Affiliation     Unknown

 

                          Race                      White

 

                          Ethnic Group              Not  or 





Author





                          Author                    Leilani Driver D.O.

 

                          Organization              [facility]

 

                          Address                   [address] [place]  [address]



 

                          Phone                     [phone]







Care Team Providers





                    Care Team Member Name Role                Phone

 

                    Akanksha Driver D.O. PP                  Unavailable

 

                          CCM                       Unavailable







Summary Purpose

Interface Exchange



Insurance Providers





             Payer name   Policy type / Coverage type Covered party ID Effective

 Begin Date 

Effective End Date

 

             [employer] [place] B [id number]  [id number]   

  Unknown

 

             [employer] Senior Supplemental Medicare Part B [id number]   [id number]    

 Unknown







Family history





Father



                          Diagnosis                 Age At Onset

 

                          Heart disease             Unknown







Mother



                          Diagnosis                 Age At Onset

 

                          Heart disease             Unknown

 

                          Cancer                    Unknown







Social History





                Social History Element Codes           Description     Effective

 Dates

 

                Tobacco history SNOMED CT: 603553314 Never smoker    2011

 

                Marital status  Unknown         Single          2010

 

                Number of children Unknown         9               2010







Allergies, Adverse Reactions, Alerts





           Substance  Reaction   Codes      Entered Date Inactivated Date Status

 

           _                     Unknown    2019 No Inactive Date Active

 

           * NO KNOWN FOOD ALLERGIES            Unknown    2010 No Inactiv

e Date Active

 

           _                     Unknown    2010 No Inactive Date Active

 

           QUINIDINE-QUININE ANALOGUES hives,     Unknown    2010 No Inact

diamante Date Active







Problems





                Condition       Codes           Effective Dates Condition Status

 

                          Essential (primary) hypertension ICD-9: 401.9

ICD-10: I10               2014                Active

 

                          Generalized anxiety disorder ICD-9: 300.00

ICD-10: F41.1             2018                Active

 

                          Palpitations              ICD-9: 785.1

ICD-10: R00.2             10/02/2018                Active

 

                          Stress reaction           ICD-9: 308.9

ICD-10: F43.0             2020                Active

 

                          Mixed hyperlipidemia      ICD-9: 272.4

ICD-10: E78.2             2019                Active

 

                          FLU VACCINE               ICD-9: V04.81

ICD-10: Z23               2012                Active

 

                          Acute serous otitis media, left ear ICD-9: 381.01

ICD-10: H65.02            2019                Active

 

                          Coronary atherosclerosis due to calcified coronary les

ion ICD-9: 414.00

ICD-10: I25.84            2018                Active

 

                          Hypoglycemia, unspecified ICD-9: 251.2

ICD-10: E16.2             2017                Active

 

                          Other fatigue             ICD-9: 780.79

ICD-10: R53.83            2017                Active

 

                          Urinary tract infection, site not specified ICD-9: 599

.0

ICD-10: N39.0             10/02/2018                Active

 

                          Gastro-esophageal reflux disease without esophagitis I

CD-9: 530.81

ICD-10: K21.9             2018                Active

 

                          Epigastric pain           ICD-9: 789.06

ICD-10: R10.13            2018                Active

 

                          Atherosclerotic heart disease of native coronary arter

y without angina pectoris 

ICD-9: 414.00

ICD-10: I25.10            2018                Active

 

                          Dizziness and giddiness   ICD-9: 780.4

ICD-10: R42               2014                Active

 

                          Occlusion and stenosis of bilateral carotid arteries I

CD-9: 433.10

ICD-10: I65.23            2018                Active

 

                          Cervicalgia               ICD-9: 723.1

ICD-10: M54.2             2018                Active

 

                          Other spondylosis with radiculopathy, cervical region 

ICD-9: 721.0

ICD-10: M47.22            2018                Active

 

                          Cervicocranial syndrome   ICD-9: 723.2

ICD-10: M53.0             2018                Active

 

                          Vertigo of central origin, bilateral ICD-9: 386.2

ICD-10: H81.43            2018                Active

 

                          Benign paroxysmal vertigo, bilateral ICD-9: 386.11

ICD-10: H81.13            2018                Active

 

                          Otalgia, bilateral        ICD-9: 388.70

ICD-10: H92.03            2018                Active

 

                          Left lower quadrant pain  ICD-9: 789.04

ICD-10: R10.32            2017                Active

 

                          Low back pain             ICD-9: 724.2

ICD-10: M54.5             2017                Active

 

                          Radiculopathy, lumbosacral region ICD-9: 724.4

ICD-10: M54.17            2018                Active

 

                          Impaired fasting glucose  ICD-9: 790.29

ICD-10: R73.01            2012                Active

 

                          Other intervertebral disc degeneration, lumbar region 

ICD-9: 722.52

ICD-10: M51.36            2017                Active

 

                          Pain in thoracic spine    ICD-9: 724.1

ICD-10: M54.6             2017                Active

 

                          Mastodynia                ICD-9: 611.71

ICD-10: N64.4             2017                Active

 

                          Acute upper respiratory infection, unspecified ICD-9: 

465.9

ICD-10: J06.9             2017                Active

 

                          Acute mastoiditis without complications, right ear ICD

-9: 383.00

ICD-10: H70.001           2016                Active

 

                          Constipation, unspecified ICD-9: 564.00

ICD-10: K59.00            2016                Active

 

                          Chronic kidney disease, stage 1 ICD-9: 585.1

ICD-10: N18.1             03/15/2016                Active

 

                          History of falling        ICD-9: V15.88

ICD-10: Z91.81            12/15/2015                Active

 

                          Muscle weakness (generalized) ICD-9: 780.79

ICD-10: M62.81            2015                Active

 

                Acute renal failure ICD-9: 584.9    2015      Active

 

                POLYURIA        ICD-9: 788.42   2015      Active

 

                CAD             ICD-9: 414.00   09/10/2015      Active

 

                Hyperglycemia   ICD-9: 790.29   2012      Active

 

                HYPERLIPIDEMIA NEC/NOS ICD-9: 272.4    09/10/2015      Active

 

                ABDOMINAL PAIN  ICD-9: 789.00   10/10/2012      Active

 

                Grieving        ICD-9: 309.0    2015      Active

 

                HYPOGLYCEMIA    ICD-9: 251.2    2012      Active

 

                MALAISE AND FATIGUE ICD-9: 780.79   2015      Active

 

                CERUMEN IMPACTION ICD-9: 380.4    2015      Active

 

                Leg pain        ICD-9: 729.5    2015      Active

 

                SUPERFIC PHLEBITIS-LEG ICD-9: 451.0    2015      Active

 

                SPASM OF MUSCLE ICD-9: 728.85   2015      Active

 

                Thoracic back pain ICD-9: 724.1    2015      Active

 

                Benign positional vertigo ICD-9: 386.11   2015      Active

 

                Suspicious nevus ICD-9: 238.2    2015      Active

 

                EUSTACHIAN TUBE DYSFUNCTION ICD-9: 381.81   2014      Acti

ve

 

                SINUSITIS, ACUTE ICD-9: 461.9    2014      Active

 

                DIZZINESS/VERTIGO ICD-9: 780.4    2014      Active

 

                HYPERTENSION    ICD-9: 401.9    2014      Active

 

                URI, ACUTE      ICD-9: 465.9    10/15/2013      Active

 

                CEPHALGIA       ICD-9: 784.0    2013      Active

 

                OTITIS MEDIA NOS ICD-9: 382.9    2013      Active

 

                Perforation of ear drum ICD-9: 384.20   05/10/2013      Active

 

                Mastalgia       ICD-9: 611.71   2013      Active

 

                DYSPEPSIA       ICD-9: 536.8    10/10/2012      Active

 

                IBS             ICD-9: 564.1    10/10/2012      Active

 

                FLU VACCINE     ICD-9: V04.81   2012      Active

 

                Radiculopathy of leg ICD-9: 724.4    2012      Active

 

                SCIATICA        ICD-9: 724.3    2012      Active

 

                Lumbar degenerative disc disease ICD-9: 722.52   2012     

 Active

 

                Sacroiliac dysfunction ICD-9: 739.4    2012      Active

 

                Hypertension    Unknown         2012      Active

 

                PAIN, LOWER BACK ICD-9: 724.2    2011      Active

 

                SPINAL ENTHESOPATHY ICD-9: 720.1    2011      Active

 

                Hypotension     ICD-9: 458.9    2011      Active

 

                SACROILIITIS NEC ICD-9: 720.2    2011      Active

 

                PHARYNGITIS, ACUTE ICD-9: 462      2010      Active

 

                URINARY TRACT INFECTION ICD-9: 599.0    2010      Active

 

                Heat exhaustion ICD-9: 992.5    2010      Active

 

                Esophagitis     ICD-9: 530.10   2010      Active

 

                Gastritis       ICD-9: 535.50   2010      Active

 

                GERD            ICD-9: 530.81   2010      Active

 

                Hiatal hernia   ICD-9: 553.3    2010      Active

 

                B12 DEFIC ANEMIA NEC ICD-9: 281.1    2010      Active

 

                Anxiety         Unknown         2010      Active

 

                Heart disease   Unknown         2010      Active

 

                Hyperlipidemia  Unknown         2010      Active

 

                ANXIETY STATE NOS ICD-9: 300.00   2010      Active

 

                DEPRESSIVE DISORDER NEC ICD-9: 311      2010      Active







Medications





          Medication Codes     Instructions Start Date Stop Date Status    Fill 

Instructions

 

                    isosorbide mononitrate ER 30 mg tablet,extended release 24 h

r RxNorm: 489578      1 

Tablet(s) Oral every night at bedtime 2020      10/25/2020      Active    

       

 

                    Flonase Allergy Relief 50 mcg/actuation nasal spray,suspensi

on RxNorm: 9949909     2

Spray Nasal every night at bedtime 2020      Inactive     

    

 

                    metoprolol tartrate 25 mg tablet RxNorm: 163082      1 Table

t(s) Oral QAM and two 

tablets (50mg) in the evening--clarification/dose change 2020          

Active                                   

 

             simvastatin 40 mg tablet RxNorm: 520642 1 Tablet(s) Oral QD 

020   

10/19/2020                Active                     

 

                omeprazole 40 mg capsule,delayed release RxNorm: 881046  1 Capsu

le(s) Oral QD 

2020          10/19/2020          Active               

 

                    Flonase Allergy Relief 50 mcg/actuation nasal spray,suspensi

on RxNorm: 0205320     2

Spray Nasal every night at bedtime 2020      Inactive     

    

 

                    isosorbide mononitrate ER 30 mg tablet,extended release 24 h

r RxNorm: 209649      1 

Tablet(s) Oral every night at bedtime 2020      Inactive  

       

 

                    metoprolol tartrate 25 mg tablet RxNorm: 741042      1 Table

t(s) Oral QAM and two 

tablets (50mg) in the evening 2020      Inactive         

 

                omeprazole 40 mg capsule,delayed release RxNorm: 811887  1 Capsu

le(s) Oral QD 

2020          Inactive             

 

                    Xanax 0.25 mg tablet RxNorm: 551514      TAKE 1 TABLET BY [facility] TWICE DAILY 1 

Tablet(s) Oral two times a day as needed as needed for anxiety 2020                Inactive                   

 

             simvastatin 40 mg tablet RxNorm: 991413 1 Tablet(s) Oral QD 

020   

2020                Inactive                   

 

                    metoprolol tartrate 25 mg tablet RxNorm: 454917      1 Table

t(s) Oral QAM and two 

tablets (50mg) in the evening 2020      Inactive         

 

                    metoprolol tartrate 25 mg tablet RxNorm: 287355      1 Table

t(s) Oral QAM and two 

tablets (50mg) in the evening 2020      Inactive         

 

                    Flonase Allergy Relief 50 mcg/actuation nasal spray,suspensi

on RxNorm: 8189759     2

Spray Nasal every night at bedtime 2020      Inactive     

    

 

           simvastatin 40 mg tablet RxNorm: 136977 1 Tablet(s) PO QD 2019 

Inactive                                 

 

                omeprazole 40 mg capsule,delayed release RxNorm: 916720  1 Capsu

le(s) Oral QD 

2019          Inactive             

 

                Xanax 0.25 mg tablet RxNorm: 022683  TAKE 1 TABLET BY MOUTH TWIC

E DAILY 

10/29/2019          2020          Inactive             

 

                omeprazole 40 mg capsule,delayed release RxNorm: 126480  1 Capsu

le(s) PO QD 

10/07/2019          2019          Inactive             

 

             metoprolol tartrate 25 mg tablet RxNorm: 500546 1 Tablet(s) PO BID 

2019                Inactive                   

 

                omeprazole 40 mg capsule,delayed release RxNorm: 890563  1 Capsu

le(s) PO QD 

2019          10/06/2019          Inactive             

 

                    Flonase Allergy Relief 50 mcg/actuation nasal spray,suspensi

on RxNorm: 0981438     2

Kite NASAL QHS 2019      Inactive         

 

           simvastatin 40 mg tablet RxNorm: 613981 1 Tablet(s) PO QD 2019 

Inactive                                 

 

           simvastatin 40 mg tablet RxNorm: 609716 1 Tablet(s) PO QD 2019 

Inactive                                 

 

                omeprazole 40 mg capsule,delayed release RxNorm: 184232  1 Capsu

le(s) PO QD 

2019          Inactive             

 

           simvastatin 40 mg tablet RxNorm: 623823 1 Tablet(s) PO QD 2019 

Inactive                                 

 

                omeprazole 40 mg capsule,delayed release RxNorm: 883078  1 Capsu

le(s) PO QD 

2019          Inactive             

 

             Bactrim  mg-160 mg tablet RxNorm: 684453 1 Tablet(s) PO BID 0

3/08/2019   

2019                Inactive                   

 

             Bactrim  mg-160 mg tablet RxNorm: 100811 1 Tablet(s) PO BID 0

3/08/2019   

2019                Inactive                   

 

             Macrobid 100 mg capsule RxNorm: 821451 1 Capsule(s) PO BID 20

                          Inactive                   

 

             metoprolol tartrate 25 mg tablet RxNorm: 754262 1 Tablet(s) PO BID 

2019                Inactive                   

 

                Xanax 0.25 mg tablet RxNorm: 721301  TAKE 1 TABLET BY MOUTH TWIC

E DAILY 

2018          10/28/2019          Inactive             

 

           Xanax 0.25 mg tablet RxNorm: 890189 1 Tablet(s) PO BID 2018 

Inactive                                 

 

                    Protonix 40 mg tablet,delayed release RxNorm: 046410      1 

Tablet(s) PO BID for 

stomach--replaces omeprazole 2018      Inactive         

 

             Carafate 1 gram tablet RxNorm: 420159 1 Tablet(s) PO AC & HS 2019                Inactive                   

 

           Xanax 0.25 mg tablet RxNorm: 676386 1 Tablet(s) PO BID 10/30/2018 12/

10/2018 

Inactive                                 

 

             Bactrim  mg-160 mg tablet RxNorm: 519119 1 Tablet(s) PO BID 1

   

10/08/2018                Inactive                   

 

             Bactrim  mg-160 mg tablet RxNorm: 569408 1 Tablet(s) PO BID 1

   

10/03/2018                Inactive                   

 

             Carafate 1 gram tablet RxNorm: 735143 1 Tablet(s) PO AC & HS 09/18/

2018   

10/17/2018                Inactive                   

 

                    Protonix 40 mg tablet,delayed release RxNorm: 493478      1 

Tablet(s) PO BID for 

stomach--replaces omeprazole 2018      Inactive         

 

                    Protonix 40 mg tablet,delayed release RxNorm: 268999      1 

Tablet(s) PO BID for 

stomach--replaces omeprazole 2018      Inactive         

 

                    fluticasone 50 mcg/actuation nasal spray,suspension RxNorm: 

1254099     2 Spray 

NASAL QD to each nostril 2018      Inactive         

 

             Nasonex 50 mcg/actuation Spray RxNorm: 4828702 2 Kite NASAL 2018                Inactive                   

 

                omeprazole 40 mg capsule,delayed release RxNorm: 027102  1 Capsu

le(s) PO QD 

2018          08/15/2018          Inactive             

 

                    simvastatin 40 mg tablet RxNorm: 057389      1 Tablet(s) PO 

QD TAKE 1 TABLET EVERY 

DAY             2018      Inactive         

 

             metoprolol tartrate 25 mg tablet RxNorm: 488432 1/2 Tablet(s) PO QD

 2018                Inactive                   

 

                simvastatin 40 mg tablet RxNorm: 463036  Tablet(s) TAKE 1 TABLET

 EVERY DAY 

2017          Inactive             

 

             metoprolol tartrate 25 mg tablet RxNorm: 705539 1/2 Tablet(s) PO QD

 2017                Inactive                   

 

                    Flonase 50 mcg/actuation nasal spray,suspension RxNorm: 1797

933     2 Kite NASAL 

BID             2017      Inactive         

 

                omeprazole 40 mg capsule,delayed release RxNorm: 309157  1 Capsu

le(s) PO QD 

2017          Inactive             

 

             metoprolol tartrate 25 mg tablet RxNorm: 526695 1/2 Tablet(s) PO QD

 04/10/2017   

2017                Inactive                   

 

                    ciprofloxacin 0.2 % ear drops in a dropperette RxNorm: 60064

6      4 Drop(s) OTIC TID

for 1 week      2016      Inactive         

 

           cefdinir 300 mg capsule RxNorm: 930859 2 Capsule(s) PO QD 2016 

Inactive                                 

 

                    omeprazole 40 mg capsule,delayed release RxNorm: 499899     

 TAKE 1 CAPSULE EVERY DAY

                2016      Inactive         

 

             simvastatin 40 mg tablet RxNorm: 263935 TAKE 1 TABLET EVERY DAY 2017                Inactive                   

 

                    Flonase 50 mcg/actuation nasal spray,suspension RxNorm: 1797

933     2 Kite NASAL 

BID             2015      Inactive         

 

             cefuroxime axetil 250 mg tablet RxNorm: 888151 1 Tablet(s) PO BID 0

2015                Inactive                   

 

             cefuroxime axetil 250 mg tablet RxNorm: 979838 1 Tablet(s) PO BID 0

2015                Inactive                   

 

                omeprazole 40 mg capsule,delayed release RxNorm: 734011  1 Capsu

le(s) PO QD 

2015          Inactive             

 

           meloxicam 7.5 mg tablet RxNorm: 778242 1 Tablet(s) PO QD 2015 0

2015 

Inactive                                 

 

                loratadine 10 mg tablet RxNorm: 103215  1 Tablet(s) PO QAM for a

llergies 

2014          Inactive             

 

                    Flonase 50 mcg/actuation nasal spray,suspension RxNorm: 8963

23      2 Kite NASAL BID

                2014      12/15/2015      Inactive         

 

           prednisone 20 mg tablet RxNorm: 711089 2 Tablet(s) PO BID 2014 

Inactive                                 

 

             Dyazide 37.5 mg-25 mg capsule RxNorm: 562562 1 Capsule(s) PO QAM 2014                Inactive                   

 

           Ceftin 500 mg tablet RxNorm: 120876 1 Tablet(s) PO BID 2014 

Inactive                                 

 

           Ceftin 500 mg tablet RxNorm: 725811 1 Tablet(s) PO BID 2014 

Inactive                                 

 

                    simvastatin 40 mg tablet RxNorm: 311847      Tablet(s) PO TA

KE ONE TABLET BY MOUTH 

EVERY DAY       2014      Inactive         

 

                    metoprolol tartrate 25 mg tablet RxNorm: 990935      Tablet(

s) PO TAKE ONE-HALF 

TABLET BY MOUTH EVERY DAY 2014      Inactive         

 

           Ceftin 500 mg tablet RxNorm: 110628 1 Tablet(s) PO BID 10/15/2013 10/

 

Inactive                                 

 

                loratadine 10 mg tablet RxNorm: 587095  1 Tablet(s) PO QAM for a

llergies 

2013          Inactive             

 

                    omeprazole 20 mg capsule,delayed release RxNorm: 922777     

 Capsule(s) PO TAKE ONE 

CAPSULE BY MOUTH TWICE DAILY 2013      Inactive         

 

                omeprazole 20 mg capsule,delayed release RxNorm: 098172  1 Capsu

le(s) PO BID 

2013          Inactive             

 

             Augmentin 875 mg-125 mg tablet RxNorm: 423431 1 Tablet(s) PO Q12H 0

5/10/2013   

2013                Inactive                   

 

             Floxin Otic Drops 1 bottle Drops RxNorm:      5 Drop(s) OTIC BID 05

/10/2013   

2013                Inactive                   

 

                    metoprolol tartrate 25 mg tablet RxNorm: 528750      Tablet(

s) PO TAKE ONE-HALF 

TABLET BY MOUTH EVERY DAY 2013      Inactive         

 

                    simvastatin 40 mg tablet RxNorm: 502549      Tablet(s) PO TA

KE ONE TABLET BY MOUTH 

EVERY DAY       2013      Inactive         

 

                lancets         RxNorm:         Misc Miscellaneous USE ONE TO CH

YOGI GLUCOSE EVERY DAY 

2013          Inactive             

 

             Carafate 1 gram tablet RxNorm: 051759 1 Tablet(s) PO AC & HS 2015                Inactive                   

 

           Flagyl 500 mg tablet RxNorm: 569843 1 Tablet(s) PO TID 10/10/2012 10/

 

Inactive                                 

 

             Carafate 1 gram tablet RxNorm: 380239 1 Tablet(s) PO AC & HS 10/10/

2012   

2012                Inactive                   

 

          One Touch Test strips RxNorm:   Miscellaneous QD 2012

 Inactive  

one touch ultra mini test strips

 

           Protonix 40 mg Tab RxNorm: 414817 1 Tablet(s) PO QD 2012 

Inactive                                 

 

           Protonix 40 mg Tab RxNorm: 365364 1 Tablet(s) PO QD 2012 10/09/

2012 

Inactive                                 

 

           prednisone 20 mg Tab RxNorm: 140963 1 Tablet(s) PO BID 2012 

Inactive                                 

 

             Flexeril 5 mg Tab RxNorm: 761249 1 Tablet(s) PO QHS for spasm 2012                Inactive                   

 

             Flexeril 5 mg Tab RxNorm: 099013 1 Tablet(s) PO QHS for spasm 2012                Inactive                   

 

                amlodipine 2.5 mg Tab RxNorm: 698167  1/2 Tablet(s) PO QD replac

es 5mg dose 

2012          Inactive             

 

           simvastatin 40 mg tablet RxNorm: 878270 1 Tablet(s) PO QD 2012 

Inactive                                 

 

             metoprolol tartrate 25 mg tablet RxNorm: 748759 1/2 Tablet(s) PO QD

 2012                Inactive                   

 

                omeprazole 20 mg capsule,delayed release RxNorm: 268300  1 Capsu

le(s) PO BID 

2012          Inactive             

 

           cefdinir 300 mg Cap RxNorm: 035574 2 Capsule(s) PO QD 2011 

Inactive                                 

 

           simvastatin 40 mg Tab RxNorm: 018599 1 Tablet(s) PO QD 2011 

Inactive                                 

 

             metoprolol tartrate 25 mg Tab RxNorm: 074149 1/2 Tablet(s) PO QD 10

/   

2012                Inactive                   

 

             metoprolol tartrate 25 mg Tab RxNorm: 453593 1/2 Tablet(s) PO QD 10

/   

10/24/2011                Inactive                   

 

           meclizine 25 mg Tab RxNorm: 1430352 1 Tablet(s) PO QHS 2011 

Inactive                                for dizziness

 

           Ceftin 500 mg Tab RxNorm: 806123 1 Tablet(s) PO BID 2011 

Inactive                                 

 

                omeprazole 20 mg Cap, Delayed Release RxNorm: 845558  1 Capsule(

s) PO BID 

2011          10/24/2011          Inactive             

 

           simvastatin 40 mg Tab RxNorm: 581798 1 Tablet(s) PO QD 2011 

Inactive                                 

 

           simvastatin 40 mg Tab RxNorm: 393848 1 Tablet(s) PO QD 2011 

Inactive                                 

 

           simvastatin 40 mg Tab RxNorm: 578690 1 Tablet(s) PO QD 2010 

Inactive                                 

 

             Tessalon Perles 100 mg Cap RxNorm: 181309 1 Capsule(s) PO Q6-8H 2010                Inactive                   

 

           cefdinir 300 mg Cap RxNorm: 371977 1 Capsule(s) PO BID 2010 

Inactive                                 

 

           Lexapro 10 mg Tab RxNorm: 135010 1 Tablet(s) PO QD 2010

010 

Inactive                                 

 

           Cipro 250 mg Tab RxNorm: 537854 1 Tablet(s) PO BID 2010 10/04/2

010 

Inactive                                 

 

             Wellbutrin  mg 24 hr Tab RxNorm: 562068 1 Tablet(s) PO QAM 2010                Inactive                   

 

          Fish Oil 1,000 mg Cap RxNorm:   1 Capsule(s) PO QD No Start Date      

     Active     

 

                Tylenol Extra Strength 500 mg tablet RxNorm: 402040  1/2 Tablet(

s) PO as needed 

No Start Date                           Active               

 

                nitroglycerin 0.4 mg sublingual tablet RxNorm: 307851  Tablet(s)

 SL as needed No 

Start Date                              Active               

 

                    Calcium with Vitamin D 600 mg (1,500 mg)-400 unit tablet RxN

orm: 899807      1 

Tablet(s) PO QD No Start Date                   Active           

 

           Multivitamin & Mineral Formula Tab RxNorm:    1 Tablet(s) PO QD No St

art Date            

Active                                   

 

          vitamin B complex capsule RxNorm:   1 Capsule(s) PO QD No Start Date  

         Active     

 

          Aspirin 81 mg Tab RxNorm: 063469 1 Tablet(s) PO QD No Start Date      

     Active     

 

           Vitamin D 1,000 unit Cap RxNorm: 280615 1 Capsule(s) PO QD No Start D

ate            

Active                                   

 

          Co Q-10 oral RxNorm: 23892 oral      No Start Date           Active   

  

 

             metoprolol tartrate 25 mg Tab RxNorm: 018326 1/2 Tablet(s) PO QD No

 Start Date 

10/23/2011                Inactive                   

 

             Nasonex 50 mcg/actuation Spray RxNorm: 4015109 2 Spray NASAL No Sta

rt Date 

2018                Inactive                   

 

           Vitamin B12 1000mcg Tablet RxNorm:    1 Tablet(s) PO QD No Start Date

 2015 

Inactive                                 

 

           amlodipine 5 mg Tab RxNorm: 661704 1 Tablet(s) PO QD No Start Date  

Inactive                                 

 

             simvastatin 40 mg tablet RxNorm: 032146 1 Tablet(s) PO QD No Start 

Date 

2019                Inactive                   

 

                omeprazole 20 mg capsule,delayed release RxNorm: 466242  1 Capsu

le(s) PO BID No 

Start Date          2013          Inactive             

 

                omeprazole 40 mg capsule,delayed release RxNorm: 216447  1 Capsu

le(s) PO QD No 

Start Date          2019          Inactive             

 

           Nexium 40 mg Cap RxNorm: 223329 1 Capsule(s) PO QD No Start Date  

Inactive                                 

 

             Stool Softener 100 mg Tab RxNorm: 9318411 2 Tablet(s) PO BID No Sta

rt Date 

2012                Inactive                   

 

                    Calcium with Vitamin D 600 mg (1,500 mg)-400 unit Tab RxNorm

: 317051      1 Tablet(s)

PO QD           No Start Date   2015      Inactive         

 

             iron 325 mg (65 mg iron) Tab RxNorm: 802055 1 Tablet(s) PO QD No St

art Date 

2015                Inactive                   

 

                Flonase 50 mcg/actuation Nasal Spray RxNorm: 237035  2 Kite ROZ

AL BID No Start 

Date                2014          Inactive             

 

                    fluticasone 50 mcg/actuation nasal spray,suspension RxNorm: 

4872480     2 Spray 

NASAL QD to each nostril No Start Date   2018      Inactive         

 

             Nasonex 50 mcg/actuation Spray RxNorm: 8203219 2 Spray NASAL QD No 

Start Date 

2018                Inactive                   

 

                    hydralazine 50 mg tablet RxNorm: 270157      1 Tablet(s) PO 

as needed for BP over 

160/90          No Start Date   2018      Inactive         

 

             Vimovo 500 mg-20 mg 12 hr Tab RxNorm: 641327 1 Tablet(s) PO BID No 

Start Date 

2011                Inactive                   

 

             Tylenol PM 25 mg-500 mg/15 mL Oral Soln RxNorm: 9245956 1 PO QPM   

  No Start Date 

2015                Inactive                   

 

          Iron (Ferrous Sulfate) Oral RxNorm:   Oral      No Start Date 20

12 Inactive   

 

             Reglan 10 mg Tab RxNorm: 887897 1 Tablet(s) PO TID before meals No 

Start Date 

2011                Inactive                   

 

           Xanax 1 mg Tab RxNorm: 828737 1/2 Tablet(s) PO QD No Start Date  

Inactive                                 

 

             amlodipine 10 mg tablet RxNorm: 278594 1 Tablet(s) PO QD No Start D

ate 

2014                Inactive                   

 

          Iron (dried) Oral RxNorm:   Oral      No Start Date 2012 Inactiv

e   

 

                metoprolol tartrate 50 mg tablet RxNorm: 677042  1 Tablet(s) PO 

BID No Start Date

                    12/10/2018          Inactive             

 

           Xanax 0.25 mg tablet RxNorm: 797043 1 Tablet(s) PO BID No Start Date 

10/29/2018 

Inactive                                 

 

                lancets         RxNorm:         Miscellaneous check blood sugar 

at least once daily No Start 

Date                2013          Inactive             

 

           simvastatin 40 mg Tab RxNorm: 455743 1 Tablet(s) PO QD No Start Date 

2010 

Inactive                                 

 

                    isosorbide mononitrate ER 30 mg tablet,extended release 24 h

r RxNorm: 127732      1 

Tablet(s) PO QHS No Start Date   2019      Inactive         

 

             amlodipine 2.5 mg tablet RxNorm: 450036 1 Tablet(s) PO QD No Start 

Date 

2014                Inactive                   

 

                    isosorbide mononitrate ER 30 mg tablet,extended release 24 h

r RxNorm: 986213      1 

Tablet(s) PO QHS No Start Date   2020      Inactive         

 

             sucralfate 1 gram tablet RxNorm: 025972 1 Tablet(s) PO QID No Start

 Date 

2019                Inactive                   

 

          Fish Oil Oral RxNorm:   Oral      No Start Date 2012 Inactive   

 

          Multiple Vitamin Oral RxNorm:   Oral      No Start Date 2012 [facility]

ctive   

 

                metoprolol tartrate 25 mg tablet RxNorm: 512461  1/2 Tablet(s) P

O QD No Start 

Date                2017          Inactive             

 

                metoprolol tartrate 50 mg tablet RxNorm: 597933  1/2 Tablet(s) P

O BID No Start 

Date                2019          Inactive             

 

                    Flonase Allergy Relief 50 mcg/actuation nasal spray,suspensi

on RxNorm: 7340429     2

Kite NASAL QHS No Start Date   2019      Inactive         







Medication Administered

No Medication Administered data



Immunizations





                Vaccine         Codes           Date            Status

 

                Influenza       CVX: 135        10/22/2019      Complete

 

                Influenza       CVX: 135        10/22/2019      Complete

 

                Influenza       CVX: 135        2018      Complete

 

                Influenza       CVX: 135        10/06/2017      Complete

 

                Influenza       CVX: 135        10/07/2016      Complete

 

                Influenza       CVX: 135        10/16/2015       

 

                Influenza       CVX: 141        2013       

 

                Influenza (Adult) CVX: 141        10/06/2010       







Results





          Observation Observation Code Item      Item Code Result    Date      S

[facility] Location

 

          COMPLETE BLOOD COUNT [id number]   WBC                 7.1 10e9/L 20

20 Unknown

 

          COMPLETE BLOOD COUNT [id number]   RBC                 4.16 10e12/L 2020 Unknown

 

          COMPLETE BLOOD COUNT [id number]   HEMOGLOBIN           12.4 g/dL 20

20 Unknown

 

          COMPLETE BLOOD COUNT [id number]   HEMATOCRIT           39.3 %    20

20 Unknown

 

          COMPLETE BLOOD COUNT [id number]   MCV                 94.5 fL   

0 Unknown

 

          COMPLETE BLOOD COUNT [id number]   MCH                 29.8 pg   

0 Unknown

 

          COMPLETE BLOOD COUNT [id number]   MCHC                31.6 g/dL 

0 Unknown

 

          COMPLETE BLOOD COUNT [id number]   PLATELET COUNT           161 10e9/L 2020 Unknown

 

          COMPLETE BLOOD COUNT [id number]   Mean Plt Volume           10.8 fL   2020 Unknown

 

          COMPLETE BLOOD COUNT [id number]   Neut Auto           38.7 %    

0 Unknown

 

          COMPLETE BLOOD COUNT [id number]   Lymph Auto           48.0 %    20

20 Unknown

 

          COMPLETE BLOOD COUNT [id number]   Mono Auto           9.5 %     

0 Unknown

 

          COMPLETE BLOOD COUNT [id number]   RDW                 13.5 %    

0 Unknown

 

          COMPLETE BLOOD COUNT [id number]   Eos Auto            3.2 %     

0 Unknown

 

          COMPLETE BLOOD COUNT [id number]   Baso Auto           0.6 %     

0 Unknown

 

          COMPLETE BLOOD COUNT [id number]   Neutrophil Abs           2.75 10e9/L  Unknown

 

          COMPLETE BLOOD COUNT [id number]   Lymphocyte Abs           3.41 10e9/L  Unknown

 

          COMPLETE BLOOD COUNT [id number]   Monocyte Abs           0.67 10e9/L 2020 Unknown

 

          COMPLETE BLOOD COUNT [id number]   Eosinophil Abs           0.23 10e9/L  Unknown

 

          COMPLETE BLOOD COUNT [id number]   RDW-SD              44.7 fL   

0 Unknown

 

          COMPLETE BLOOD COUNT [id number]   Basophil Abs           0.04 10e9/L 2020 Unknown

 

          THYROID STIMULATING HORMONE 37638     TSH                 1.677 uIU/mL

 2020 Unknown

 

          COMPREHENSIVE METABOLIC 04213     AST                 19 U/L    2020 Unknown

 

          COMPREHENSIVE METABOLIC 84261     ALT                 12 U/L    2020 Unknown

 

          COMPREHENSIVE METABOLIC 16914     BUN                 17 mg/dL  2020 Unknown

 

          COMPREHENSIVE METABOLIC 02642     ALBUMIN             4.2 g/dL  2020 Unknown

 

          COMPREHENSIVE METABOLIC 58466     CHLORIDE            103 mmol/L  Unknown

 

          COMPREHENSIVE METABOLIC 01580     Bili Total           0.2 mg/dL  Unknown

 

          COMPREHENSIVE METABOLIC 39891     ALK PHOS            61 U/L    2020 Unknown

 

          COMPREHENSIVE METABOLIC 99184     SODIUM              140 mmol/L  Unknown

 

          COMPREHENSIVE METABOLIC 75190     CREATININE           0.95 mg/dL 2020 Unknown

 

          COMPREHENSIVE METABOLIC 54247     CALCIUM             9.4 mg/dL 2020 Unknown

 

          COMPREHENSIVE METABOLIC 05304     POTASSIUM           4.2 mmol/L  Unknown

 

          COMPREHENSIVE METABOLIC 39131     Total Protein           6.5 g/dL   Unknown

 

          COMPREHENSIVE METABOLIC 94003     Glucose             103 mg/dL 2020 Unknown

 

          COMPREHENSIVE METABOLIC 70770     Bicarbonate           26 mmol/L 2020 Unknown

 

          COMPREHENSIVE METABOLIC 99108     AGAP                11 mmol/L 2020 Unknown

 

          GFR CALC  5453606   GFR Non Afr Amr           57 mL/min 2020 Unk

nown

 

          GFR CALC  4481734   GFR Afr Amr           >60 mL/min 2020 Unknow

n

 

          GFR CALC  0138889   GFR Non Afr Amr           52 mL/min 10/11/2019 Unk

nown

 

          GFR CALC  0761946   GFR Afr Amr           >60 mL/min 10/11/2019 Unknow

n

 

          COMPREHENSIVE METABOLIC 39046     AST                 17 U/L    10/11/

2019 Unknown

 

          COMPREHENSIVE METABOLIC 88056     ALT                 11 U/L    10/11/

2019 Unknown

 

          COMPREHENSIVE METABOLIC 85217     BUN                 17 mg/dL  10/11/

2019 Unknown

 

          COMPREHENSIVE METABOLIC 84198     ALBUMIN             3.9 g/dL  10/11/

2019 Unknown

 

          COMPREHENSIVE METABOLIC 07594     CHLORIDE            108 mmol/L 10/11

/2019 Unknown

 

          COMPREHENSIVE METABOLIC 59372     Bili Total           0.5 mg/dL 10/11

/2019 Unknown

 

          COMPREHENSIVE METABOLIC 71997     ALK PHOS            72 U/L    10/11/

2019 Unknown

 

          COMPREHENSIVE METABOLIC 42982     SODIUM              142 mmol/L 10/11

/2019 Unknown

 

          COMPREHENSIVE METABOLIC 45277     CREATININE           1.02 mg/dL 10/1

2019 Unknown

 

          COMPREHENSIVE METABOLIC 24936     CALCIUM             9.3 mg/dL 10/11/

2019 Unknown

 

          COMPREHENSIVE METABOLIC 49779     POTASSIUM           4.0 mmol/L 10/11

/2019 Unknown

 

          COMPREHENSIVE METABOLIC 67216     Total Protein           6.2 g/dL  10

/2019 Unknown

 

          COMPREHENSIVE METABOLIC 39247     Glucose             93 mg/dL  10/11/

2019 Unknown

 

          COMPREHENSIVE METABOLIC 27650     Bicarbonate           27 mmol/L 10/1

2019 Unknown

 

          COMPREHENSIVE METABOLIC 80453     AGAP                7 mmol/L  10/11/

2019 Unknown

 

          GFR CALC  8956889   GFR Non Afr Amr           48 mL/min 06/10/2019 Unk

nown

 

          GFR CALC  1797973   GFR Afr Amr           59 mL/min 06/10/2019 Unknown

 

          THYROID STIMULATING HORMONE 48404     TSH                 1.936 uIU/mL

 06/10/2019 Unknown

 

          COMPREHENSIVE METABOLIC 11967     AST                 18 U/L    06/10/

2019 Unknown

 

          COMPREHENSIVE METABOLIC 36385     ALT                 11 U/L    06/10/

2019 Unknown

 

          COMPREHENSIVE METABOLIC 84303     BUN                 18 mg/dL  06/10/

2019 Unknown

 

          COMPREHENSIVE METABOLIC 92126     ALBUMIN             4.2 g/dL  06/10/

2019 Unknown

 

          COMPREHENSIVE METABOLIC 34738     CHLORIDE            109 mmol/L 06/10

/2019 Unknown

 

          COMPREHENSIVE METABOLIC 77473     Bili Total           0.4 mg/dL 06/10

/2019 Unknown

 

          COMPREHENSIVE METABOLIC 56160     ALK PHOS            64 U/L    06/10/

2019 Unknown

 

          COMPREHENSIVE METABOLIC 44093     SODIUM              142 mmol/L 06/10

/2019 Unknown

 

          COMPREHENSIVE METABOLIC 12137     CREATININE           1.09 mg/dL  Unknown

 

          COMPREHENSIVE METABOLIC 53966     CALCIUM             9.3 mg/dL 06/10/

2019 Unknown

 

          COMPREHENSIVE METABOLIC 05141     POTASSIUM           4.7 mmol/L 06/10

/2019 Unknown

 

          COMPREHENSIVE METABOLIC 54640     Total Protein           6.3 g/dL  06

/10/2019 Unknown

 

          COMPREHENSIVE METABOLIC 66403     Glucose             84 mg/dL  06/10/

2019 Unknown

 

          COMPREHENSIVE METABOLIC 52603     Bicarbonate           25 mmol/L  Unknown

 

          COMPREHENSIVE METABOLIC 59388     AGAP                8 mmol/L  06/10/

2019 Unknown

 

          COMPLETE BLOOD COUNT [id number]   WBC                 7.8 10e9/L 06/10/20

19 Unknown

 

          COMPLETE BLOOD COUNT [id number]   RBC                 4.04 10e12/L 06/10/

2019 Unknown

 

          COMPLETE BLOOD COUNT [id number]   HEMOGLOBIN           12.2 g/dL 06/10/20

19 Unknown

 

          COMPLETE BLOOD COUNT [id number]   HEMATOCRIT           38.6 %    06/10/20

19 Unknown

 

          COMPLETE BLOOD COUNT [id number]   MCV                 95.5 fL   06/10/201

9 Unknown

 

          COMPLETE BLOOD COUNT [id number]   MCH                 30.2 pg   06/10/201

9 Unknown

 

          COMPLETE BLOOD COUNT [id number]   MCHC                31.6 g/dL 06/10/201

9 Unknown

 

          COMPLETE BLOOD COUNT [id number]   PLATELET COUNT           215 10e9/L 06/

10/2019 Unknown

 

          COMPLETE BLOOD COUNT [id number]   Mean Plt Volume           11.2 fL   06/

10/2019 Unknown

 

          COMPLETE BLOOD COUNT [id number]   Neut Auto           45.7 %    06/10/201

9 Unknown

 

          COMPLETE BLOOD COUNT [id number]   Lymph Auto           42.1 %    06/10/20

19 Unknown

 

          COMPLETE BLOOD COUNT [id number]   Mono Auto           9.2 %     06/10/201

9 Unknown

 

          COMPLETE BLOOD COUNT [id number]   RDW                 13.4 %    06/10/201

9 Unknown

 

          COMPLETE BLOOD COUNT [id number]   Eos Auto            2.7 %     06/10/201

9 Unknown

 

          COMPLETE BLOOD COUNT [id number]   Baso Auto           0.3 %     06/10/201

9 Unknown

 

          COMPLETE BLOOD COUNT [id number]   Neutrophil Abs           3.56 10e9/L 06

/10/2019 Unknown

 

          COMPLETE BLOOD COUNT [id number]   Lymphocyte Abs           3.28 10e9/L 06

/10/2019 Unknown

 

          COMPLETE BLOOD COUNT [id number]   Monocyte Abs           0.72 10e9/L  Unknown

 

          COMPLETE BLOOD COUNT [id number]   Eosinophil Abs           0.21 10e9/L 06

/10/2019 Unknown

 

          COMPLETE BLOOD COUNT [id number]   RDW-SD              44.9 fL   06/10/201

9 Unknown

 

          COMPLETE BLOOD COUNT [id number]   Basophil Abs           0.02 10e9/L  Unknown

 

          COMPLETE BLOOD COUNT [id number]   WBC                 5.2 10e9/L 20

18 Unknown

 

          COMPLETE BLOOD COUNT [id number]   RBC                 4.21 10e12/L 2018 Unknown

 

          COMPLETE BLOOD COUNT [id number]   HEMOGLOBIN           12.8 g/dL 20

18 Unknown

 

          COMPLETE BLOOD COUNT [id number]   HEMATOCRIT           39.0 %    20

18 Unknown

 

          COMPLETE BLOOD COUNT [id number]   MCV                 92.6 fL   

8 Unknown

 

          COMPLETE BLOOD COUNT [id number]   MCH                 30.4 pg   

8 Unknown

 

          COMPLETE BLOOD COUNT [id number]   MCHC                32.8 g/dL 

8 Unknown

 

          COMPLETE BLOOD COUNT [id number]   PLATELET COUNT           202 10e9/L  Unknown

 

          COMPLETE BLOOD COUNT [id number]   Mean Plt Volume           10.7 fL    Unknown

 

          COMPLETE BLOOD COUNT [id number]   Neut Auto           40.9 %    

8 Unknown

 

          COMPLETE BLOOD COUNT [id number]   Lymph Auto           46.3 %    20

18 Unknown

 

          COMPLETE BLOOD COUNT [id number]   Mono Auto           9.3 %     

8 Unknown

 

          COMPLETE BLOOD COUNT [id number]   RDW                 13.7 %    

8 Unknown

 

          COMPLETE BLOOD COUNT [id number]   Eos Auto            2.9 %     

8 Unknown

 

          COMPLETE BLOOD COUNT [id number]   Baso Auto           0.6 %     

8 Unknown

 

          COMPLETE BLOOD COUNT [id number]   Neutrophil Abs           2.13 10e9/L  Unknown

 

          COMPLETE BLOOD COUNT [id number]   Lymphocyte Abs           2.41 10e9/L  Unknown

 

          COMPLETE BLOOD COUNT [id number]   Monocyte Abs           0.48 10e9/L  Unknown

 

          COMPLETE BLOOD COUNT [id number]   Eosinophil Abs           0.15 10e9/L  Unknown

 

          COMPLETE BLOOD COUNT [id number]   RDW-SD              45.2 fL   

8 Unknown

 

          COMPLETE BLOOD COUNT [id number]   Basophil Abs           0.03 10e9/L  Unknown

 

          [place] [address]     Glucose             118 mg/dL  Unknown

 

          [place] [address]     CREATININE           1.01 mg/dL  Unknown

 

          [place] [address]     BUN                 14 mg/dL   Unknown

 

          [place] [address]     SODIUM              141 mmol/L  Unknown

 

          [place] [address]     POTASSIUM           4.0 mmol/L  Unknown

 

          [place] [address]     CHLORIDE            108 mmol/L  Unknown

 

          [place] [address]     Bicarbonate           25 mmol/L  Unknown

 

          [place] [address]     AGAP                8 mmol/L   Unknown

 

          [place] [address]     CALCIUM             9.6 mg/dL  Unknown

 

          FREE T4   81970     T4 Free             1.23 ng/dL 2018 Unknown

 

          GFR CALC  1860408   GFR Non Afr Amr           53 mL/min 2018 Unk

nown

 

          GFR CALC  5173776   GFR Afr Amr           >60 mL/min 2018 Unknow

n

 

          THYROID STIMULATING HORMONE 83756     TSH                 2.124 uIU/mL

 2018 Unknown

 

          LIPID GROUP 69253     Cholesterol           152 mg/dL 2018 Unkno

wn

 

          LIPID GROUP 73474     Triglyceride           151 mg/dL 2018 Unkn

own

 

          LIPID GROUP 14400     HDL CHOLESTEROL           47 mg/dL  2018 U

nknown

 

          LIPID GROUP 49733     Chol/HDL Ratio           3.23 ratio 2018 U

nknown

 

          LIPID GROUP 36003     NON-HDL Chol           105 mg/dL 2018 Unkn

own

 

          LIPID GROUP 83639     LDL Cholesterol           75 mg/dL  2018 U

nknown

 

          ASSAY OF TROPONIN QUANT 86173     Troponin-I           <0.30 ng/mL  Unknown

 

          COMPREHENSIVE METABOLIC 33659     AST                 20 U/L    2018 Unknown

 

          COMPREHENSIVE METABOLIC 48984     ALT                 14 U/L    2018 Unknown

 

          COMPREHENSIVE METABOLIC 62885     BUN                 19 mg/dL  2018 Unknown

 

          COMPREHENSIVE METABOLIC 59635     ALBUMIN             4.2 g/dL  2018 Unknown

 

          COMPREHENSIVE METABOLIC 02496     CHLORIDE            102 mmol/L  Unknown

 

          COMPREHENSIVE METABOLIC 48543     Bili Total           0.4 mg/dL  Unknown

 

          COMPREHENSIVE METABOLIC 63381     ALK PHOS            66 U/L    2018 Unknown

 

          COMPREHENSIVE METABOLIC 97834     SODIUM              135 mmol/L  Unknown

 

          COMPREHENSIVE METABOLIC 44717     CREATININE           1.01 mg/dL  Unknown

 

          COMPREHENSIVE METABOLIC 55567     CALCIUM             9.3 mg/dL 2018 Unknown

 

          COMPREHENSIVE METABOLIC 80100     POTASSIUM           4.8 mmol/L  Unknown

 

          COMPREHENSIVE METABOLIC 21138     Total Protein           7.0 g/dL   Unknown

 

          COMPREHENSIVE METABOLIC 55425     Glucose             91 mg/dL  2018 Unknown

 

          COMPREHENSIVE METABOLIC 20882     Bicarbonate           23 mmol/L  Unknown

 

          COMPREHENSIVE METABOLIC 70463     AGAP                10 mmol/L 2018 Unknown

 

          COMPLETE BLOOD COUNT [id number]   WBC                 7.5 10e9/L 20

18 Unknown

 

          COMPLETE BLOOD COUNT [id number]   RBC                 4.13 10e12/L 2018 Unknown

 

          COMPLETE BLOOD COUNT [id number]   HEMOGLOBIN           12.6 g/dL 20

18 Unknown

 

          COMPLETE BLOOD COUNT [id number]   HEMATOCRIT           38.4 %    20

18 Unknown

 

          COMPLETE BLOOD COUNT [id number]   MCV                 93.0 fL   

8 Unknown

 

          COMPLETE BLOOD COUNT [id number]   MCH                 30.5 pg   

8 Unknown

 

          COMPLETE BLOOD COUNT [id number]   MCHC                32.8 g/dL 

8 Unknown

 

          COMPLETE BLOOD COUNT [id number]   PLATELET COUNT           204 10e9/L  Unknown

 

          COMPLETE BLOOD COUNT [id number]   Mean Plt Volume           10.9 fL    Unknown

 

          COMPLETE BLOOD COUNT [id number]   Neut Auto           43.1 %    

8 Unknown

 

          COMPLETE BLOOD COUNT [id number]   Lymph Auto           45.0 %    20

18 Unknown

 

          COMPLETE BLOOD COUNT [id number]   Mono Auto           9.2 %     

8 Unknown

 

          COMPLETE BLOOD COUNT [id number]   RDW                 13.4 %    

8 Unknown

 

          COMPLETE BLOOD COUNT [id number]   Eos Auto            2.3 %     

8 Unknown

 

          COMPLETE BLOOD COUNT [id number]   Baso Auto           0.4 %     

8 Unknown

 

          COMPLETE BLOOD COUNT [id number]   Neutrophil Abs           3.23 10e9/L  Unknown

 

          COMPLETE BLOOD COUNT [id number]   Lymphocyte Abs           3.38 10e9/L  Unknown

 

          COMPLETE BLOOD COUNT [id number]   Monocyte Abs           0.69 10e9/L  Unknown

 

          COMPLETE BLOOD COUNT [id number]   Eosinophil Abs           0.17 10e9/L  Unknown

 

          COMPLETE BLOOD COUNT [id number]   RDW-SD              44.4 fL   

8 Unknown

 

          COMPLETE BLOOD COUNT [id number]   Basophil Abs           0.03 10e9/L  Unknown

 

          GFR CALC  4871491   GFR Non Afr Amr           53 mL/min 2018 Unk

nown

 

          GFR CALC  2049266   GFR Afr Amr           >60 mL/min 2018 Unknow

n

 

          GLYCOSYLATED HEMOGLOBIN TEST 30627     Hgb A1c   55322-7   5.4 %     0

2018 Unknown

 

          MEAN GLUC 5071034   Calc Mean Gluc           108 mg/dL 2018 Unkn

own

 

          MEAN GLUC 1034577   Calc Mean Gluc           114 mg/dL 2017 Unkn

own

 

          LIPID GROUP [id number]     Cholesterol           146 mg/dL 2017 Unkno

wn

 

          LIPID GROUP 01772     Triglyceride           119 mg/dL 2017 Unkn

own

 

          LIPID GROUP [id number]     HDL CHOLESTEROL           47 mg/dL  2017 U

nknown

 

          LIPID GROUP 41450     Chol/HDL Ratio           3.11 ratio 2017 U

nknown

 

          LIPID GROUP 03822     NON-HDL Chol           99 mg/dL  2017 Unkn

own

 

          LIPID GROUP 74799     LDL Cholesterol           75 mg/dL  2017 U

nknown

 

          GLYCOSYLATED HEMOGLOBIN TEST 65949     Hgb A1c   50180-3   5.6 %     0

2017 Unknown

 

          COMPREHENSIVE METABOLIC 21014     AST                 22 U/L    2017 Unknown

 

          COMPREHENSIVE METABOLIC 00305     ALT                 12 U/L    2017 Unknown

 

          COMPREHENSIVE METABOLIC 04146     BUN                 17 mg/dL  2017 Unknown

 

          COMPREHENSIVE METABOLIC 62666     ALBUMIN             4.0 g/dL  2017 Unknown

 

          COMPREHENSIVE METABOLIC 19735     CHLORIDE            110 mmol/L  Unknown

 

          COMPREHENSIVE METABOLIC 34941     Bili Total           0.4 mg/dL  Unknown

 

          COMPREHENSIVE METABOLIC 23093     ALK PHOS            63 U/L    2017 Unknown

 

          COMPREHENSIVE METABOLIC 56259     SODIUM              140 mmol/L  Unknown

 

          COMPREHENSIVE METABOLIC 68727     CREATININE           1.05 mg/dL  Unknown

 

          COMPREHENSIVE METABOLIC 00556     CALCIUM             9.2 mg/dL 2017 Unknown

 

          COMPREHENSIVE METABOLIC 16537     POTASSIUM           4.2 mmol/L  Unknown

 

          COMPREHENSIVE METABOLIC 57134     Total Protein           6.2 g/dL   Unknown

 

          COMPREHENSIVE METABOLIC 43980     Glucose             87 mg/dL  2017 Unknown

 

          COMPREHENSIVE METABOLIC 14268     Bicarbonate           24 mmol/L  Unknown

 

          COMPREHENSIVE METABOLIC 47848     AGAP                6 mmol/L  2017 Unknown

 

          GFR CALC  3445207   GFR Non Afr Amr           51 mL/min 2017 Unk

nown

 

          GFR CALC  0953722   GFR Afr Amr           >60 mL/min 2017 Unknow

n

 

          COMPLETE BLOOD COUNT [id number]   WBC                 6.7 10e9/L 20

17 Unknown

 

          COMPLETE BLOOD COUNT [id number]   RBC                 4.04 10e12/L 2017 Unknown

 

          COMPLETE BLOOD COUNT [id number]   HEMOGLOBIN           12.1 g/dL 20

17 Unknown

 

          COMPLETE BLOOD COUNT [id number]   HEMATOCRIT           38.0 %    20

17 Unknown

 

          COMPLETE BLOOD COUNT [id number]   MCV                 94.1 fL   

7 Unknown

 

          COMPLETE BLOOD COUNT [id number]   MCH                 30.0 pg   

7 Unknown

 

          COMPLETE BLOOD COUNT [id number]   MCHC                31.8 g/dL 

7 Unknown

 

          COMPLETE BLOOD COUNT [id number]   PLATELET COUNT           206 10e9/L  Unknown

 

          COMPLETE BLOOD COUNT [id number]   Mean Plt Volume           11.3 fL    Unknown

 

          COMPLETE BLOOD COUNT [id number]   Neut Auto           35.8 %    

7 Unknown

 

          COMPLETE BLOOD COUNT [id number]   Lymph Auto           51.6 %    20

17 Unknown

 

          COMPLETE BLOOD COUNT [id number]   Mono Auto           8.8 %     

7 Unknown

 

          COMPLETE BLOOD COUNT [id number]   RDW                 13.5 %    

7 Unknown

 

          COMPLETE BLOOD COUNT 6290392   Eos Auto            3.4 %     

7 Unknown

 

          COMPLETE BLOOD COUNT [id number]   Baso Auto           0.4 %     

7 Unknown

 

          COMPLETE BLOOD COUNT [id number]   Neutrophil Abs           2.40 10e9/L  Unknown

 

          COMPLETE BLOOD COUNT [id number]   Lymphocyte Abs           3.46 10e9/L  Unknown

 

          COMPLETE BLOOD COUNT [id number]   Monocyte Abs           0.59 10e9/L  Unknown

 

          COMPLETE BLOOD COUNT [id number]   Eosinophil Abs           0.23 10e9/L  Unknown

 

          COMPLETE BLOOD COUNT [id number]   Basophil Abs           0.03 10e9/L  Unknown

 

          COMPLETE BLOOD COUNT [id number]   RDW-SD              45.3 fL   

7 Unknown

 

          THYROID STIMULATING HORMONE 49506     TSH                 1.981 uIU/mL

 2017 Unknown

 

          COMPLETE BLOOD COUNT [id number]   WBC                 6.0 10e9/L 20

17 Unknown

 

          COMPLETE BLOOD COUNT [id number]   RBC                 4.29 10e12/L 2017 Unknown

 

          COMPLETE BLOOD COUNT [id number]   HEMOGLOBIN           12.9 g/dL 20

17 Unknown

 

          COMPLETE BLOOD COUNT [id number]   HEMATOCRIT           38.4 %    20

17 Unknown

 

          COMPLETE BLOOD COUNT [id number]   MCV                 89.5 fL   

7 Unknown

 

          COMPLETE BLOOD COUNT [id number]   MCH                 30.1 pg   

7 Unknown

 

          COMPLETE BLOOD COUNT [id number]   MCHC                33.6 g/dL 

7 Unknown

 

          COMPLETE BLOOD COUNT [id number]   PLATELET COUNT           181 10e9/L 2017 Unknown

 

          COMPLETE BLOOD COUNT [id number]   Mean Plt Volume           11.7 fL   2017 Unknown

 

          COMPLETE BLOOD COUNT [id number]   Neut Auto           36.9 %    

7 Unknown

 

          COMPLETE BLOOD COUNT [id number]   Lymph Auto           50.4 %    20

17 Unknown

 

          COMPLETE BLOOD COUNT [id number]   Mono Auto           9.0 %     

7 Unknown

 

          COMPLETE BLOOD COUNT [id number]   RDW                 13.7 %    

7 Unknown

 

          COMPLETE BLOOD COUNT [id number]   Eos Auto            3.4 %     

7 Unknown

 

          COMPLETE BLOOD COUNT [id number]   Baso Auto           0.3 %     

7 Unknown

 

          COMPLETE BLOOD COUNT [id number]   Neutrophil Abs           2.21 10e9/L  Unknown

 

          COMPLETE BLOOD COUNT [id number]   Lymphocyte Abs           3.02 10e9/L  Unknown

 

          COMPLETE BLOOD COUNT [id number]   Monocyte Abs           0.54 10e9/L 2017 Unknown

 

          COMPLETE BLOOD COUNT [id number]   Eosinophil Abs           0.20 10e9/L  Unknown

 

          COMPLETE BLOOD COUNT 7780197   RDW-SD              44.0 fL   

7 Unknown

 

          COMPLETE BLOOD COUNT 1384009   Basophil Abs           0.02 10e9/L 2017 Unknown

 

          GLYCOSYLATED HEMOGLOBIN TEST 43494     Hgb A1c   72336-9   5.4 %     0

3/09/2017 Unknown

 

          THYROID STIMULATING HORMONE 32403     TSH                 2.200 uIU/mL

 2017 Unknown

 

          GFR CALC  0123072   GFR Afr Amr           >60 mL/min 2017 Unknow

n

 

          GFR CALC  1507139   GFR Non Afr Amr           50 mL/min 2017 Unk

nown

 

          MEAN GLUC 4719649   Calc Mean Gluc           108 mg/dL 2017 Unkn

own

 

          COMPREHENSIVE METABOLIC 95713     AST                 18 U/L    2017 Unknown

 

          COMPREHENSIVE METABOLIC 73266     ALT                 10 U/L    2017 Unknown

 

          COMPREHENSIVE METABOLIC 66415     BUN                 20 mg/dL  2017 Unknown

 

          COMPREHENSIVE METABOLIC 81456     ALBUMIN             4.1 g/dL  2017 Unknown

 

          COMPREHENSIVE METABOLIC 26480     CHLORIDE            109 mmol/L  Unknown

 

          COMPREHENSIVE METABOLIC 25209     Bili Total           0.6 mg/dL  Unknown

 

          COMPREHENSIVE METABOLIC 52547     ALK PHOS            64 U/L    2017 Unknown

 

          COMPREHENSIVE METABOLIC 54865     SODIUM              141 mmol/L  Unknown

 

          COMPREHENSIVE METABOLIC 61734     CREATININE           1.06 mg/dL 2017 Unknown

 

          COMPREHENSIVE METABOLIC 98889     CALCIUM             9.9 mg/dL 2017 Unknown

 

          COMPREHENSIVE METABOLIC 35122     POTASSIUM           4.2 mmol/L  Unknown

 

          COMPREHENSIVE METABOLIC 25543     Total Protein           6.3 g/dL   Unknown

 

          COMPREHENSIVE METABOLIC 92593     Glucose             99 mg/dL  2017 Unknown

 

          COMPREHENSIVE METABOLIC 03627     Bicarbonate           21 mmol/L 2017 Unknown

 

          COMPREHENSIVE METABOLIC 86227     AGAP                11 mmol/L 2017 Unknown

 

          LIPID GROUP 78329     Cholesterol           169 mg/dL 2016 Unkno

wn

 

          LIPID GROUP 51267     Triglyceride           165 mg/dL 2016 Unkn

own

 

          LIPID GROUP 91361     HDL CHOLESTEROL           43 mg/dL  2016 U

nknown

 

          LIPID GROUP 26280     Chol/HDL Ratio           3.93 ratio 2016 U

nknown

 

          LIPID GROUP 49281     NON-HDL Chol           126 mg/dL 2016 Unkn

own

 

          LIPID GROUP 49191     LDL Cholesterol           93 mg/dL  2016 U

nknown

 

          COMPREHENSIVE METABOLIC 51918     AST                 18 U/L    2016 Unknown

 

          COMPREHENSIVE METABOLIC 94783     ALT                 10 U/L    2016 Unknown

 

          COMPREHENSIVE METABOLIC 97946     BUN                 20 mg/dL  2016 Unknown

 

          COMPREHENSIVE METABOLIC 54922     ALBUMIN             3.9 g/dL  2016 Unknown

 

          COMPREHENSIVE METABOLIC 95607     CHLORIDE            110 mmol/L  Unknown

 

          COMPREHENSIVE METABOLIC 55050     Bili Total           0.5 mg/dL  Unknown

 

          COMPREHENSIVE METABOLIC 84242     ALK PHOS            72 U/L    2016 Unknown

 

          COMPREHENSIVE METABOLIC 82403     SODIUM              141 mmol/L  Unknown

 

          COMPREHENSIVE METABOLIC 68099     CREATININE           1.12 mg/dL  Unknown

 

          COMPREHENSIVE METABOLIC 77661     CALCIUM             9.7 mg/dL 2016 Unknown

 

          COMPREHENSIVE METABOLIC 64042     POTASSIUM           4.4 mmol/L  Unknown

 

          COMPREHENSIVE METABOLIC 52907     Total Protein           6.2 g/dL   Unknown

 

          COMPREHENSIVE METABOLIC 07106     Glucose             90 mg/dL  2016 Unknown

 

          COMPREHENSIVE METABOLIC 76331     Bicarbonate           23 mmol/L  Unknown

 

          COMPREHENSIVE METABOLIC 02858     AGAP                8 mmol/L  2016 Unknown

 

          GFR CALC  6251573   GFR Non Afr Amr           47 mL/min 2016 Unk

nown

 

          GFR CALC  9656934   GFR Afr Amr           57 mL/min 2016 Unknown

 

          GLYCOSYLATED HEMOGLOBIN TEST 04670     Hgb A1c   05708-2   5.5 %     0

2016 Unknown

 

          THYROID STIMULATING HORMONE 01599     TSH                 2.537 uIU/mL

 2016 Unknown

 

          FREE T4   14539     T4 Free             1.36 ng/dL 2016 Unknown

 

          COMPLETE BLOOD COUNT [id number]   WBC                 6.8 10e9/L 20

16 Unknown

 

          COMPLETE BLOOD COUNT [id number]   RBC                 4.20 10e12/L 2016 Unknown

 

          COMPLETE BLOOD COUNT 9499652   HEMOGLOBIN           12.5 g/dL 20

16 Unknown

 

          COMPLETE BLOOD COUNT 2298436   HEMATOCRIT           38.0 %    20

16 Unknown

 

          COMPLETE BLOOD COUNT 9540841   MCV                 90.5 fL   

6 Unknown

 

          COMPLETE BLOOD COUNT 7421899   MCH                 29.8 pg   

6 Unknown

 

          COMPLETE BLOOD COUNT 2278916   MCHC                32.9 g/dL 

6 Unknown

 

          COMPLETE BLOOD COUNT 5750643   PLATELET COUNT           197 10e9/L  Unknown

 

          COMPLETE BLOOD COUNT [id number]   Mean Plt Volume           11.7 fL    Unknown

 

          COMPLETE BLOOD COUNT [id number]   Neut Auto           41.3 %    

6 Unknown

 

          COMPLETE BLOOD COUNT [id number]   Lymph Auto           47.1 %    20

16 Unknown

 

          COMPLETE BLOOD COUNT 8508214   Mono Auto           7.8 %     

6 Unknown

 

          COMPLETE BLOOD COUNT [id number]   RDW                 13.8 %    

6 Unknown

 

          COMPLETE BLOOD COUNT [id number]   Eos Auto            3.4 %     

6 Unknown

 

          COMPLETE BLOOD COUNT 6896815   Baso Auto           0.4 %     

6 Unknown

 

          COMPLETE BLOOD COUNT 9497962   Neutrophil Abs           2.81 10e9/L  Unknown

 

          COMPLETE BLOOD COUNT [id number]   Lymphocyte Abs           3.20 10e9/L  Unknown

 

          COMPLETE BLOOD COUNT [id number]   Monocyte Abs           0.53 10e9/L  Unknown

 

          COMPLETE BLOOD COUNT [id number]   Eosinophil Abs           0.23 10e9/L  Unknown

 

          COMPLETE BLOOD COUNT [id number]   Basophil Abs           0.03 10e9/L  Unknown

 

          COMPLETE BLOOD COUNT [id number]   RDW-SD              44.4 fL   

6 Unknown

 

          MEAN GLUC 8391556   Calc Mean Gluc           111 mg/dL 2016 Unkn

own

 

          [place] [address]     Glucose             89 MG/DL   Unknown

 

          [place] [address]     CREATININE           1.12 MG/DL  Unknown

 

          [place] [address]     BUN                 20 MG/DL   Unknown

 

          [place] [address]     SODIUM              139 MMOL/L  Unknown

 

          [place] [address]     POTASSIUM           4.6 MMOL/L  Unknown

 

          [place] [address]     CHLORIDE            108 MMOL/L  Unknown

 

          [place] [address]     BICARB              26 MMOL/L  Unknown

 

          [place] [address]     ANION GAP           5 MEQ/L    Unknown

 

          [place] [address]     CALCIUM             10.0 MG/DL  Unknown

 

          GFR CALC  0509692   GFR AA              57.0L ML/MIN 2015 Unknow

n

 

          GFR CALC  0603302   GFR NON-AA           47.0L ML/MIN 2015 Unkno

wn

 

          THYROID STIMULATING HORMONE 19698     TSH                 2.378 uIU/ML

 09/10/2015 Unknown

 

          COMPLETE BLOOD COUNT [id number]   WBC                 6.4 10e9/L 09/10/20

15 Unknown

 

          COMPLETE BLOOD COUNT [id number]   RBC                 3.99 10e12/L 09/10/

2015 Unknown

 

          COMPLETE BLOOD COUNT [id number]   HGB                 11.9 g/dL 09/10/201

5 Unknown

 

          COMPLETE BLOOD COUNT [id number]   HCT DET             36.9 %    09/10/201

5 Unknown

 

          COMPLETE BLOOD COUNT [id number]   MCV                 92.5 fL   09/10/201

5 Unknown

 

          COMPLETE BLOOD COUNT [id number]   MCH                 29.8 pg   09/10/201

5 Unknown

 

          COMPLETE BLOOD COUNT [id number]   MCHC                32.2 g/dL 09/10/201

5 Unknown

 

          COMPLETE BLOOD COUNT [id number]   PLT                 172 10e9/L 09/10/20

15 Unknown

 

          COMPLETE BLOOD COUNT [id number]   MPV                 11.7 fL   09/10/201

5 Unknown

 

          COMPLETE BLOOD COUNT 3089976   ARIK %               40.4 %    09/10/201

5 Unknown

 

          COMPLETE BLOOD COUNT [id number]   LY %                48.0 %    09/10/201

5 Unknown

 

          COMPLETE BLOOD COUNT [id number]   MON %               8.3 %     09/10/201

5 Unknown

 

          COMPLETE BLOOD COUNT [id number]   EOS  %              2.8 %     09/10/201

5 Unknown

 

          COMPLETE BLOOD COUNT [id number]   BASO %              0.5 %     09/10/201

5 Unknown

 

          COMPLETE BLOOD COUNT [id number]   RDW                 13.6 %    09/10/201

5 Unknown

 

          COMPLETE BLOOD COUNT [id number]   ABS ARIK             2.59 10e9/L 09/10/2

015 Unknown

 

          COMPLETE BLOOD COUNT [id number]   ABS LYMPH           3.07 10e9/L 09/10/2

015 Unknown

 

          COMPLETE BLOOD COUNT [id number]   ABS MONO            0.53 10e9/L 09/10/2

015 Unknown

 

          COMPLETE BLOOD COUNT [id number]   ABS EOS             0.18 10e9/L 09/10/2

015 Unknown

 

          COMPLETE BLOOD COUNT [id number]   ABS BASO            0.03 10e9/L 09/10/2

015 Unknown

 

          COMPLETE BLOOD COUNT [id number]   RDW-SD              44.9 fL   09/10/201

5 Unknown

 

          LIPID GROUP 87082     HDL TEST            42 MG/DL  09/10/2015 Unknown

 

          LIPID GROUP 43531     TRIG                177 MG/DL 09/10/2015 Unknown

 

          LIPID GROUP 09271     TEST LDL            72 MG/DL  09/10/2015 Unknown

 

          LIPID GROUP 23897     CHOL                149 MG/DL 09/10/2015 Unknown

 

          LIPID GROUP 41638     RCHOL/HDL           3.55 RATIO 09/10/2015 Unknow

n

 

          LIPID GROUP 16434     NON-HDL CH           107 MG/DL 09/10/2015 Unknow

n

 

          GLYCOSYLATED HEMOGLOBIN TEST 90741     A1C HPLC  98878-4   5.5 %     0

9/10/2015 Unknown

 

          FREE T4   91752     FREE T4             1.39 NG/DL 09/10/2015 Unknown

 

          GFR CALC  0539920   GFR AA              55.0L ML/MIN 09/10/2015 Unknow

n

 

          GFR CALC  3612919   GFR NON-AA           46.0L ML/MIN 09/10/2015 Unkno

wn

 

          COMPREHENSIVE METABOLIC 02530     AST                 17 U/L    09/10/

2015 Unknown

 

          COMPREHENSIVE METABOLIC 91681     ALT                 10 IU/L   09/10/

2015 Unknown

 

          COMPREHENSIVE METABOLIC 65574     BUN                 20 MG/DL  09/10/

2015 Unknown

 

          COMPREHENSIVE METABOLIC 18349     ALBUMIN             3.9 GM/DL 09/10/

2015 Unknown

 

          COMPREHENSIVE METABOLIC 63197     CHLORIDE            111 MMOL/L 09/10

/2015 Unknown

 

          COMPREHENSIVE METABOLIC 91202     BILI TOT            0.4 MG/DL 09/10/

2015 Unknown

 

          COMPREHENSIVE METABOLIC 85691     ALK PHOS            70 U/L    09/10/

2015 Unknown

 

          COMPREHENSIVE METABOLIC 72740     SODIUM              142 MMOL/L 09/10

/2015 Unknown

 

          COMPREHENSIVE METABOLIC 40839     CREATININE           1.16 MG/DL  Unknown

 

          COMPREHENSIVE METABOLIC 41785     CALCIUM             9.4 MG/DL 09/10/

2015 Unknown

 

          COMPREHENSIVE METABOLIC 47048     POTASSIUM           4.6 MMOL/L 09/10

/2015 Unknown

 

          COMPREHENSIVE METABOLIC 40084     PROT TOT            6.2 GM/DL 09/10/

2015 Unknown

 

          COMPREHENSIVE METABOLIC 21942     Glucose             90 MG/DL  09/10/

2015 Unknown

 

          COMPREHENSIVE METABOLIC 53562     BICARB              24 MMOL/L 09/10/

2015 Unknown

 

          COMPREHENSIVE METABOLIC 97323     ANION GAP           7 MEQ/L   09/10/

2015 Unknown

 

          THYROID STIMULATING HORMONE 65594     TSH                 2.427 uIU/ML

 2015 Unknown

 

          LIPID GROUP [id number]     HDL TEST            47 MG/DL  2015 Unknown

 

          LIPID GROUP 85464     TRIG                145 MG/DL 2015 Unknown

 

          LIPID GROUP 47754     TEST LDL            73 MG/DL  2015 Unknown

 

          LIPID GROUP 16267     CHOL                149 MG/DL 2015 Unknown

 

          LIPID GROUP 61536     RCHOL/HDL           3.17 RATIO 2015 Unknow

n

 

          LIPID GROUP 93116     NON-HDL CH           102 MG/DL 2015 Unknow

n

 

          COMPREHENSIVE METABOLIC 12830     AST                 17 U/L    2015 Unknown

 

          COMPREHENSIVE METABOLIC 69541     ALT                 9 IU/L    2015 Unknown

 

          COMPREHENSIVE METABOLIC 79645     BUN                 19 MG/DL  2015 Unknown

 

          COMPREHENSIVE METABOLIC 10755     ALBUMIN             4.3 GM/DL 2015 Unknown

 

          COMPREHENSIVE METABOLIC 52931     CHLORIDE            108 MMOL/L  Unknown

 

          COMPREHENSIVE METABOLIC 88744     BILI TOT            0.5 MG/DL 2015 Unknown

 

          COMPREHENSIVE METABOLIC 22878     ALK PHOS            68 U/L    2015 Unknown

 

          COMPREHENSIVE METABOLIC 08152     SODIUM              140 MMOL/L  Unknown

 

          COMPREHENSIVE METABOLIC 30463     CREATININE           1.08 MG/DL 2015 Unknown

 

          COMPREHENSIVE METABOLIC 10128     CALCIUM             9.9 MG/DL 2015 Unknown

 

          COMPREHENSIVE METABOLIC 49973     POTASSIUM           4.3 MMOL/L  Unknown

 

          COMPREHENSIVE METABOLIC 75141     PROT TOT            7.2 GM/DL 2015 Unknown

 

          COMPREHENSIVE METABOLIC 74617     Glucose             94 MG/DL  2015 Unknown

 

          COMPREHENSIVE METABOLIC 55445     BICARB              26 MMOL/L 2015 Unknown

 

          COMPREHENSIVE METABOLIC 21211     ANION GAP           6 MEQ/L   2015 Unknown

 

          GFR CALC  7705747   GFR AA              60.0L ML/MIN 2015 Unknow

n

 

          GFR CALC  4715858   GFR NON-AA           49.0L ML/MIN 2015 Unkno

wn

 

          GLYCOSYLATED HEMOGLOBIN TEST 10302     A1C HPLC  89906-6   5.6 %     0

3/06/2015 Unknown

 

          COMPLETE BLOOD COUNT 9799234   WBC                 7.2 10e9/L 20

15 Unknown

 

          COMPLETE BLOOD COUNT 0171169   RBC                 4.28 10e12/L 2015 Unknown

 

          COMPLETE BLOOD COUNT 3215585   HGB                 12.8 g/dL 

5 Unknown

 

          COMPLETE BLOOD COUNT 4462272   HCT DET             39.3 %    

5 Unknown

 

          COMPLETE BLOOD COUNT 5119611   MCV                 91.8 fL   

5 Unknown

 

          COMPLETE BLOOD COUNT 4104260   MCH                 29.9 pg   

5 Unknown

 

          COMPLETE BLOOD COUNT 5980797   MCHC                32.6 g/dL 

5 Unknown

 

          COMPLETE BLOOD COUNT 8505009   PLT                 189 10e9/L 20

15 Unknown

 

          COMPLETE BLOOD COUNT 0935383   MPV                 11.2 fL   

5 Unknown

 

          COMPLETE BLOOD COUNT [id number]   ARIK %               38.0 %    

5 Unknown

 

          COMPLETE BLOOD COUNT 4672661   LY %                51.0 %    

5 Unknown

 

          COMPLETE BLOOD COUNT 4700947   MON %               7.7 %     

5 Unknown

 

          COMPLETE BLOOD COUNT 9906097   EOS  %              2.9 %     

5 Unknown

 

          COMPLETE BLOOD COUNT 4026541   BASO %              0.4 %     

5 Unknown

 

          COMPLETE BLOOD COUNT 9347425   RDW                 14.0 %    

5 Unknown

 

          COMPLETE BLOOD COUNT 5732041   ABS ARIK             2.74 10e9/L 

015 Unknown

 

          COMPLETE BLOOD COUNT [id number]   ABS LYMPH           3.67 10e9/L 

015 Unknown

 

          COMPLETE BLOOD COUNT 4398498   ABS MONO            0.55 10e9/L 

015 Unknown

 

          COMPLETE BLOOD COUNT 8175519   ABS EOS             0.21 10e9/L 

015 Unknown

 

          COMPLETE BLOOD COUNT 9071965   ABS BASO            0.03 10e9/L 

015 Unknown

 

          COMPLETE BLOOD COUNT 7403315   RDW-SD              46.1 fL   

5 Unknown

 

          FREE T4   31185     FREE T4             1.14 NG/DL 2015 Unknown

 

          GLYCOSYLATED HEMOGLOBIN TEST 13348     A1C HPLC  17588-5   5.2 %     0

2014 Unknown

 

          FREE T4   53358     FREE T4             1.40 NG/DL 2014 Unknown

 

          GFR CALC  6010027   GFR AA              >60 ML/MIN 2014 Unknown

 

          GFR CALC  2061262   GFR NON-AA           52.0L ML/MIN 2014 Unkno

wn

 

          COMPREHENSIVE METABOLIC 18874     AST                 15 U/L    2014 Unknown

 

          COMPREHENSIVE METABOLIC 97661     ALT                 9 IU/L    2014 Unknown

 

          COMPREHENSIVE METABOLIC 89813     BUN                 17 MG/DL  2014 Unknown

 

          COMPREHENSIVE METABOLIC 03147     ALBUMIN             4.0 GM/DL 2014 Unknown

 

          COMPREHENSIVE METABOLIC 90273     CHLORIDE            112 MMOL/L  Unknown

 

          COMPREHENSIVE METABOLIC 36675     BILI TOT            0.5 MG/DL 2014 Unknown

 

          COMPREHENSIVE METABOLIC 46555     ALK PHOS            66 U/L    2014 Unknown

 

          COMPREHENSIVE METABOLIC 04255     SODIUM              140 MMOL/L  Unknown

 

          COMPREHENSIVE METABOLIC 39635     CREATININE           1.03 MG/DL  Unknown

 

          COMPREHENSIVE METABOLIC 38106     CALCIUM             9.5 MG/DL 2014 Unknown

 

          COMPREHENSIVE METABOLIC 84152     POTASSIUM           4.1 MMOL/L  Unknown

 

          COMPREHENSIVE METABOLIC 39831     PROT TOT            6.2 GM/DL 2014 Unknown

 

          COMPREHENSIVE METABOLIC 12791     Glucose             102 MG/DL 2014 Unknown

 

          COMPREHENSIVE METABOLIC 22433     BICARB              23 MMOL/L 2014 Unknown

 

          COMPREHENSIVE METABOLIC 87159     ANION GAP           5 MEQ/L   2014 Unknown

 

          THYROID STIMULATING HORMONE 12460     TSH                 2.074 uIU/ML

 2014 Unknown

 

          VITAMIN B 12 FOLIC ACID 64169|38362 VIT B 12            423 PG/ML  Unknown

 

          VITAMIN B 12 FOLIC ACID 49005|42047 FOLIC ACID           19.7 NG/ML  Unknown

 

          LIPID GROUP [id number]     HDL TEST            40 MG/DL  2014 Unknown

 

          LIPID GROUP [id number]     TRIG                145 MG/DL 2014 Unknown

 

          LIPID GROUP [id number]     TEST LDL            81 MG/DL  2014 Unknown

 

          LIPID GROUP [id number]     CHOL                150 MG/DL 2014 Unknown

 

          LIPID GROUP [id number]     RCHOL/HDL           3.75 RATIO 2014 Unknow

n

 

          COMPLETE BLOOD COUNT [id number]   WBC                 6.0 10e9/L 20

14 Unknown

 

          COMPLETE BLOOD COUNT [id number]   RBC                 4.26 10e12/L 2014 Unknown

 

          COMPLETE BLOOD COUNT 4700060   HGB                 12.7 g/dL 

4 Unknown

 

          COMPLETE BLOOD COUNT 8140480   HCT DET             38.7 %    

4 Unknown

 

          COMPLETE BLOOD COUNT 6657073   MCV                 90.8 fL   

4 Unknown

 

          COMPLETE BLOOD COUNT [id number]   MCH                 29.8 pg   

4 Unknown

 

          COMPLETE BLOOD COUNT [id number]   MCHC                32.8 g/dL 

4 Unknown

 

          COMPLETE BLOOD COUNT 3994256   PLT                 178 10e9/L 20

14 Unknown

 

          COMPLETE BLOOD COUNT 1588998   MPV                 11.7 fL   

4 Unknown

 

          COMPLETE BLOOD COUNT [id number]   ARIK %               30.5 %    

4 Unknown

 

          COMPLETE BLOOD COUNT 9430064   LY %                55.4 %    

4 Unknown

 

          COMPLETE BLOOD COUNT 3483551   MON %               9.0 %     

4 Unknown

 

          COMPLETE BLOOD COUNT 1248338   EOS  %              4.4 %     

4 Unknown

 

          COMPLETE BLOOD COUNT 6505614   BASO %              0.7 %     

4 Unknown

 

          COMPLETE BLOOD COUNT 9247807   RDW                 13.3 %    

4 Unknown

 

          COMPLETE BLOOD COUNT 4807046   ABS ARIK             1.83 10e9/L 

014 Unknown

 

          COMPLETE BLOOD COUNT 6187736   ABS LYMPH           3.32 10e9/L 

014 Unknown

 

          COMPLETE BLOOD COUNT [id number]   ABS MONO            0.54 10e9/L 

014 Unknown

 

          COMPLETE BLOOD COUNT [id number]   ABS EOS             0.26 10e9/L 

014 Unknown

 

          COMPLETE BLOOD COUNT 4730685   ABS BASO            0.04 10e9/L 

014 Unknown

 

          COMPLETE BLOOD COUNT 0801080   RDW-SD              43.2 fL   

4 Unknown

 

          HEMOGLOBIN A1C (GLYCOSYLATED) 1436011   A1C HPLC  86961-7   5.5 %     

2012 Unknown

 

          COMPLETE BLOOD COUNT [id number]   WBC                 6.0 10e9/L 20

12 Unknown

 

          COMPLETE BLOOD COUNT 2627712   RBC                 4.22 10e12/L 2012 Unknown

 

          COMPLETE BLOOD COUNT 5696146   HGB                 12.4 g/dL 

2 Unknown

 

          COMPLETE BLOOD COUNT 4780739   HCT DET             38.2 %    

2 Unknown

 

          COMPLETE BLOOD COUNT 7212767   MCV                 90.5 fL   

2 Unknown

 

          COMPLETE BLOOD COUNT 4755367   MCH                 29.4 pg   

2 Unknown

 

          COMPLETE BLOOD COUNT 0235012   MCHC                32.5 g/dL 

2 Unknown

 

          COMPLETE BLOOD COUNT 0986825   PLT                 187 10e9/L 20

12 Unknown

 

          COMPLETE BLOOD COUNT 5322016   MPV                 11.5 fL   

2 Unknown

 

          COMPLETE BLOOD COUNT 8766079   ARIK %               36.4 %    

2 Unknown

 

          COMPLETE BLOOD COUNT 1342302   LY %                51.0 %    

2 Unknown

 

          COMPLETE BLOOD COUNT 1244100   MON %               8.7 %     

2 Unknown

 

          COMPLETE BLOOD COUNT 9880309   EOS  %              3.2 %     

2 Unknown

 

          COMPLETE BLOOD COUNT 3498857   BASO %              0.7 %     

2 Unknown

 

          COMPLETE BLOOD COUNT 7915588   RDW                 13.7 %    

2 Unknown

 

          COMPLETE BLOOD COUNT 1398045   ABS ARIK             2.18 10e9/L 

012 Unknown

 

          COMPLETE BLOOD COUNT 4228560   ABS LYMPH           3.06 10e9/L 

012 Unknown

 

          COMPLETE BLOOD COUNT [id number]   ABS MONO            0.52 10e9/L 

012 Unknown

 

          COMPLETE BLOOD COUNT 1413990   ABS EOS             0.19 10e9/L 

012 Unknown

 

          COMPLETE BLOOD COUNT 8632672   ABS BASO            0.04 10e9/L 

012 Unknown

 

          COMPLETE BLOOD COUNT 9932300   RDW-SD              44.3 fL   

2 Unknown

 

          LIPID GROUP [id number]     HDL TEST            42 MG/DL  2012 Unknown

 

          LIPID GROUP [id number]     TRIG                156 MG/DL 2012 Unknown

 

          LIPID GROUP [id number]     TEST LDL            80 MG/DL  2012 Unknown

 

          LIPID GROUP [id number]     CHOL                153 MG/DL 2012 Unknown

 

          LIPID GROUP [id number]     RCHOL/HDL           3.64 RATIO 2012 Unknow

n

 

          FREE T4   82066     FREE T4             1.22 NG/DL 2012 Unknown

 

          COMPREHENSIVE METABOLIC 56683     AST                 20 U/L    2012 Unknown

 

          COMPREHENSIVE METABOLIC 98857     ALT                 11 IU/L   2012 Unknown

 

          COMPREHENSIVE METABOLIC 51012     BUN                 19 MG/DL  2012 Unknown

 

          COMPREHENSIVE METABOLIC 02979     ALBUMIN             4.3 GM/DL 2012 Unknown

 

          COMPREHENSIVE METABOLIC 48109     CHLORIDE            109 MMOL/L  Unknown

 

          COMPREHENSIVE METABOLIC 16626     BILI TOT            0.6 MG/DL 2012 Unknown

 

          COMPREHENSIVE METABOLIC 33842     ALK PHOS            84 U/L    2012 Unknown

 

          COMPREHENSIVE METABOLIC 37656     SODIUM              142 MMOL/L  Unknown

 

          COMPREHENSIVE METABOLIC 22798     CREATININE           1.09 MG/DL  Unknown

 

          COMPREHENSIVE METABOLIC 33688     CALCIUM             9.8 MG/DL 2012 Unknown

 

          COMPREHENSIVE METABOLIC 06001     POTASSIUM           4.2 MMOL/L  Unknown

 

          COMPREHENSIVE METABOLIC 12769     PROT TOT            6.4 GM/DL 2012 Unknown

 

          COMPREHENSIVE METABOLIC 88148     Glucose             89 MG/DL  2012 Unknown

 

          COMPREHENSIVE METABOLIC 06468     BICARB              25 MMOL/L 2012 Unknown

 

          COMPREHENSIVE METABOLIC 43214     ANION GAP           8 MEQ/L   2012 Unknown

 

          GFR CALC  5400024   GFR AA              60.0L ML/MIN 2012 Unknow

n

 

          GFR CALC  5084286   GFR NON-AA           49.0L ML/MIN 2012 Unkno

wn

 

          THYROID STIMULATING HORMONE 29646     TSH                 2.450 uIU/ML

 2012 Unknown

 

          COMPREHENSIVE METABOLIC 51338     AST                 22 U/L    2012 Unknown

 

          COMPREHENSIVE METABOLIC 61947     ALT                 14 IU/L   2012 Unknown

 

          COMPREHENSIVE METABOLIC 50215     BUN                 21 MG/DL  2012 Unknown

 

          COMPREHENSIVE METABOLIC 92922     ALBUMIN             4.3 GM/DL 2012 Unknown

 

          COMPREHENSIVE METABOLIC 73560     CHLORIDE            106 MMOL/L  Unknown

 

          COMPREHENSIVE METABOLIC 45465     BILI TOT            0.4 MG/DL 2012 Unknown

 

          COMPREHENSIVE METABOLIC 92919     ALK PHOS            80 U/L    2012 Unknown

 

          COMPREHENSIVE METABOLIC 60216     SODIUM              141 MMOL/L  Unknown

 

          COMPREHENSIVE METABOLIC 98936     CREATININE           1.13 MG/DL  Unknown

 

          COMPREHENSIVE METABOLIC 58957     CALCIUM             9.4 MG/DL 2012 Unknown

 

          COMPREHENSIVE METABOLIC 49277     POTASSIUM           4.3 MMOL/L  Unknown

 

          COMPREHENSIVE METABOLIC 42243     PROT TOT            6.7 GM/DL 2012 Unknown

 

          COMPREHENSIVE METABOLIC 78727     Glucose             98 MG/DL  2012 Unknown

 

          COMPREHENSIVE METABOLIC 23052     BICARB              25 MMOL/L 2012 Unknown

 

          COMPREHENSIVE METABOLIC 72992     ANION GAP           10 MEQ/L  2012 Unknown

 

          LIPID GROUP [id number]     HDL TEST            44 MG/DL  2012 Unknown

 

          LIPID GROUP [id number]     TRIG                164 MG/DL 2012 Unknown

 

          LIPID GROUP [id number]     TEST LDL            98 MG/DL  2012 Unknown

 

          LIPID GROUP 31036     CHOL                175 MG/DL 2012 Unknown

 

          LIPID GROUP [id number]     RCHOL/HDL           3.98 RATIO 2012 Unknow

n

 

          COMPLETE BLOOD COUNT 11202     WBC                 6.7 10e9/L 20

12 Unknown

 

          COMPLETE BLOOD COUNT 96777     RBC                 4.36 10e12/L 2012 Unknown

 

          COMPLETE BLOOD COUNT 73554     HGB                 12.9 g/dL 

2 Unknown

 

          COMPLETE BLOOD COUNT 00636     HCT DET             39.4 %    

2 Unknown

 

          COMPLETE BLOOD COUNT 71060     MCV                 90.4 fL   

2 Unknown

 

          COMPLETE BLOOD COUNT 50103     MCH                 29.6 pg   

2 Unknown

 

          COMPLETE BLOOD COUNT 01154     MCHC                32.7 g/dL 

2 Unknown

 

          COMPLETE BLOOD COUNT 08278     PLT                 184 10e9/L 20

12 Unknown

 

          COMPLETE BLOOD COUNT 21754     MPV                 10.9 fL   

2 Unknown

 

          COMPLETE BLOOD COUNT 85555     ARIK %               41.5 %    

2 Unknown

 

          COMPLETE BLOOD COUNT 12641     LY %                45.7 %    

2 Unknown

 

          COMPLETE BLOOD COUNT 32088     MON %               9.4 %     

2 Unknown

 

          COMPLETE BLOOD COUNT 47538     EOS  %              3.0 %     

2 Unknown

 

          COMPLETE BLOOD COUNT 74655     BASO %              0.4 %     

2 Unknown

 

          COMPLETE BLOOD COUNT 21901     RDW                 13.2 %    

2 Unknown

 

          COMPLETE BLOOD COUNT 16571     ABS ARIK             2.78 10e9/L 

012 Unknown

 

          COMPLETE BLOOD COUNT 38789     ABS LYMPH           3.06 10e9/L 

012 Unknown

 

          COMPLETE BLOOD COUNT 19924     ABS MONO            0.63 10e9/L 

012 Unknown

 

          COMPLETE BLOOD COUNT 52183     ABS EOS             0.20 10e9/L 

012 Unknown

 

          COMPLETE BLOOD COUNT 47443     ABS BASO            0.03 10e9/L 

012 Unknown

 

          COMPLETE BLOOD COUNT 29400     RDW-SD              42.3 fL   

2 Unknown

 

          GFR CALC     GFR AA              57.0L ML/MIN 2012 Unknow

n

 

          GFR CALC  8854476   GFR NON-AA           47.0L ML/MIN 2012 Unkno

wn

 

          THYROID STIMULATING HORMONE 46962     TSH                 2.663 uIU/ML

 2012 Unknown

 

          FREE T4   71921     FREE T4             1.15 NG/DL 2012 Unknown

 

          THYROID STIMULATING HORMONE 24783     TSH                 1.908 uIU/ML

 2011 Unknown

 

          COMPLETE BLOOD COUNT 72979     WBC                 6.4 10e9/L 20

11 Unknown

 

          COMPLETE BLOOD COUNT 92987     RBC                 3.92 10e12/L 2011 Unknown

 

          COMPLETE BLOOD COUNT 95893     HGB                 11.8 g/dL 

1 Unknown

 

          COMPLETE BLOOD COUNT 06661     HCT DET             36.0 %    

1 Unknown

 

          COMPLETE BLOOD COUNT 08061     MCV                 91.8 fL   

1 Unknown

 

          COMPLETE BLOOD COUNT 71196     MCH                 30.1 pg   

1 Unknown

 

          COMPLETE BLOOD COUNT 04334     MCHC                32.8 g/dL 

1 Unknown

 

          COMPLETE BLOOD COUNT 21215     PLT                 176 10e9/L 20

11 Unknown

 

          COMPLETE BLOOD COUNT 92825     MPV                 11.4 fL   

1 Unknown

 

          COMPLETE BLOOD COUNT 13041     ARIK %               50.4 %    

1 Unknown

 

          COMPLETE BLOOD COUNT 52266     LY %                35.5 %    

1 Unknown

 

          COMPLETE BLOOD COUNT 03211     MON %               10.2 %    

1 Unknown

 

          COMPLETE BLOOD COUNT 85250     EOS  %              3.3 %     

1 Unknown

 

          COMPLETE BLOOD COUNT 63452     BASO %              0.6 %     

1 Unknown

 

          COMPLETE BLOOD COUNT 69186     RDW                 13.7 %    

1 Unknown

 

          COMPLETE BLOOD COUNT 82070     ABS ARIK             3.23 10e9/L 

011 Unknown

 

          COMPLETE BLOOD COUNT 32028     ABS LYMPH           2.27 10e9/L 

011 Unknown

 

          COMPLETE BLOOD COUNT 15987     ABS MONO            0.65 10e9/L 

011 Unknown

 

          COMPLETE BLOOD COUNT 19381     ABS EOS             0.21 10e9/L 

011 Unknown

 

          COMPLETE BLOOD COUNT 52477     ABS BASO            0.04 10e9/L 

011 Unknown

 

          COMPLETE BLOOD COUNT 51837     RDW-SD              45.3 fL   

1 Unknown

 

          GFR CALC  6196232   GFR AA              >60 ML/MIN 2011 Unknown

 

          GFR CALC  6341739   GFR NON-AA           53.0L ML/MIN 2011 Unkno

wn

 

          FREE T4   46342     FREE T4             1.20 NG/DL 2011 Unknown

 

          COMPREHENSIVE METABOLIC 20912     AST                 17 U/L    2011 Unknown

 

          COMPREHENSIVE METABOLIC 84751     ALT                 9 IU/L    2011 Unknown

 

          COMPREHENSIVE METABOLIC 97622     BUN                 16 MG/DL  2011 Unknown

 

          COMPREHENSIVE METABOLIC 86744     ALBUMIN             4.0 GM/DL 2011 Unknown

 

          COMPREHENSIVE METABOLIC 89816     CHLORIDE            108 MMOL/L  Unknown

 

          COMPREHENSIVE METABOLIC 99125     BILI TOT            0.5 MG/DL 2011 Unknown

 

          COMPREHENSIVE METABOLIC 45593     ALK PHOS            76 U/L    2011 Unknown

 

          COMPREHENSIVE METABOLIC 29386     SODIUM              139 MMOL/L  Unknown

 

          COMPREHENSIVE METABOLIC 15645     CREATININE           1.02 MG/DL 2011 Unknown

 

          COMPREHENSIVE METABOLIC 19550     CALCIUM             9.2 MG/DL 2011 Unknown

 

          COMPREHENSIVE METABOLIC 45016     POTASSIUM           4.5 MMOL/L  Unknown

 

          COMPREHENSIVE METABOLIC 76692     PROT TOT            6.1 GM/DL 2011 Unknown

 

          COMPREHENSIVE METABOLIC 62623     Glucose             93 MG/DL  2011 Unknown

 

          COMPREHENSIVE METABOLIC 69100     BICARB              26 MMOL/L 2011 Unknown

 

          COMPREHENSIVE METABOLIC 12783     ANION GAP           5 MEQ/L   2011 Unknown

 

          LIPID GROUP 08321     HDL TEST            46 MG/DL  2011 Unknown

 

          LIPID GROUP 25443     TRIG                102 MG/DL 2011 Unknown

 

          LIPID GROUP 97527     TEST LDL            88 MG/DL  2011 Unknown

 

          LIPID GROUP 93251     CHOL                154 MG/DL 2011 Unknown

 

          LIPID GROUP 28349     RCHOL/HDL           3.35 RATIO 2011 Unknow

n







Procedures





                    Procedure           Codes               Date

 

                    ROUTINE VENIPUNCTURE CPT-4: 23696        2020

 

                    ASSAY THYROID STIM HORMONE CPT-4: 43110        2020

 

                    COMPLETE CBC W/AUTO DIFF WBC CPT-4: 38603        2020

 

                    COMPREHEN METABOLIC PANEL CPT-4: 80596        2020

 

                    ROUTINE VENIPUNCTURE CPT-4: 78583        2019

 

                    LIPID PANEL         CPT-4: 36362        2019

 

                    FLU VACC PRSV FREE INC ANTIG 65 AND OLDER CPT-4: 27284      

  10/22/2019

 

                    FLU VACC PRSV FREE INC ANTIG 65 AND OLDER CPT-4: 13418      

  10/22/2019

 

                    ADMIN INFLUENZA VIRUS VAC CPT-4:         10/22/2019

 

                    COMPREHEN METABOLIC PANEL CPT-4: 11445        10/11/2019

 

                    ROUTINE VENIPUNCTURE CPT-4: 73298        10/11/2019

 

                    ROUTINE VENIPUNCTURE CPT-4: 44556        06/10/2019

 

                    ASSAY THYROID STIM HORMONE CPT-4: 19852        06/10/2019

 

                    COMPREHEN METABOLIC PANEL CPT-4: 76266        06/10/2019

 

                    COMPLETE CBC W/AUTO DIFF WBC CPT-4: 50957        06/10/2019

 

                    URINALYSIS NONAUTO W/O SCOPE CPT-4: 75699        2019

 

                    URINE CULTURE/ COLONY COUNT CPT-4: 53607        2019

 

                    URINE CULTURE/ COLONY COUNT CPT-4: 02106        10/02/2018

 

                    ROUTINE VENIPUNCTURE CPT-4: 03092        2018

 

                    ASSAY OF FREE THYROXINE CPT-4: 48762        2018

 

                    ASSAY THYROID STIM HORMONE CPT-4: 67849        2018

 

                    COMPLETE CBC W/AUTO DIFF WBC CPT-4: 52767        2018

 

                    METABOLIC PANEL TOTAL CA CPT-4: 41975        2018

 

                    FLU VACC PRSV FREE INC ANTIG 65 AND OLDER CPT-4: 78007      

  2018

 

                    ASSAY, GLUCOSE, BLOOD QUANT CPT-4: 92113        2018

 

                    ADMIN INFLUENZA VIRUS VAC CPT-4:         2018

 

                    ROUTINE VENIPUNCTURE CPT-4: 74813        2018

 

                    COMPREHEN METABOLIC PANEL CPT-4: 08274        2018

 

                    COMPLETE CBC W/AUTO DIFF WBC CPT-4: 06322        2018

 

                    A1C HPLC            CPT-4: 41059        2018

 

                    ASSAY OF TROPONIN QUANT CPT-4: 18206        2018

 

                    LIPID PANEL         CPT-4: 47993        2018

 

                    THER/PROPH/DIAG INJ SC/IM CPT-4: 61591        2018

 

                    TRIAMCINOLONE ACET INJ NOS CPT-4:         2018

 

                    URINALYSIS NONAUTO W/O SCOPE CPT-4: 67140        2018

 

                    URINE CULTURE/ COLONY COUNT CPT-4: 37312        2018

 

                    FLU VACC PRSV FREE INC ANTIG 65 AND OLDER CPT-4: 61294      

  10/06/2017

 

                    ADMIN INFLUENZA VIRUS VAC CPT-4:         10/06/2017

 

                    ROUTINE VENIPUNCTURE CPT-4: 78867        2017

 

                    COMPREHEN METABOLIC PANEL CPT-4: 76584        2017

 

                    COMPLETE CBC W/AUTO DIFF WBC CPT-4: 28663        2017

 

                    LIPID PANEL         CPT-4: 87727        2017

 

                    A1C HPLC            CPT-4: 92874        2017

 

                    ASSAY THYROID STIM HORMONE CPT-4: 71392        2017

 

                    ROUTINE VENIPUNCTURE CPT-4: 31992        2017

 

                    ASSAY THYROID STIM HORMONE CPT-4: 07366        2017

 

                    COMPREHEN METABOLIC PANEL CPT-4: 09508        2017

 

                    COMPLETE CBC W/AUTO DIFF WBC CPT-4: 18107        2017

 

                    A1C HPLC            CPT-4: 31606        2017

 

                    FLU VACC PRSV FREE INC ANTIG 65 AND OLDER CPT-4: 41797      

  10/07/2016

 

                    ADMIN INFLUENZA VIRUS VAC CPT-4:         10/07/2016

 

                    ROUTINE VENIPUNCTURE CPT-4: 30534        2016

 

                    ASSAY OF FREE THYROXINE CPT-4: 03708        2016

 

                    ASSAY THYROID STIM HORMONE CPT-4: 83018        2016

 

                    COMPREHEN METABOLIC PANEL CPT-4: 48225        2016

 

                    COMPLETE CBC W/AUTO DIFF WBC CPT-4: 69103        2016

 

                    LIPID PANEL         CPT-4: 59218        2016

 

                    A1C HPLC            CPT-4: 29194        2016

 

                    URINALYSIS NONAUTO W/O SCOPE CPT-4: 53006        2016

 

                    ROUTINE VENIPUNCTURE CPT-4: 12071        2015

 

                    METABOLIC PANEL TOTAL CA CPT-4: 06594        2015

 

                    PRESCRIP TRANSMIT VIA ERX SY CPT-4:         2015

 

                    FLU VACC PRSV FREE INC ANTIG 65 AND OLDER CPT-4: 85356      

  10/16/2015

 

                    ADMIN INFLUENZA VIRUS VAC CPT-4:         10/16/2015

 

                    URINALYSIS NONAUTO W/O SCOPE CPT-4: 38562        09/15/2015

 

                    URINE CULTURE/ COLONY COUNT CPT-4: 10820        09/15/2015

 

                    ROUTINE VENIPUNCTURE CPT-4: 18343        09/10/2015

 

                    ASSAY OF FREE THYROXINE CPT-4: 46489        09/10/2015

 

                    ASSAY THYROID STIM HORMONE CPT-4: 61358        09/10/2015

 

                    COMPREHEN METABOLIC PANEL CPT-4: 52614        09/10/2015

 

                    COMPLETE CBC W/AUTO DIFF WBC CPT-4: 28515        09/10/2015

 

                    LIPID PANEL         CPT-4: 02615        09/10/2015

 

                    A1C HPLC            CPT-4: 19662        09/10/2015

 

                    CERUM REMOVAL       CPT-4: 13981        2015

 

                    PRESCRIP TRANSMIT VIA ERX SY CPT-4:         2015

 

                    FLUZONE, 5ML (Medicare) CPT-4:         10/17/2014

 

                    ADMIN INFLUENZA VIRUS VAC CPT-4:         10/17/2014

 

                    PRESCRIP TRANSMIT VIA ERX SY CPT-4:         2014

 

                    PRESCRIP TRANSMIT VIA ERX SY CPT-4:         2014

 

                    PRESCRIP TRANSMIT VIA ERX SY CPT-4:         2014

 

                    ROUTINE VENIPUNCTURE CPT-4: 58840        2014

 

                    ASSAY OF FREE THYROXINE CPT-4: 43362        2014

 

                    ASSAY THYROID STIM HORMONE CPT-4: 52565        2014

 

                    COMPREHEN METABOLIC PANEL CPT-4: 38043        2014

 

                    COMPLETE CBC W/AUTO DIFF WBC CPT-4: 09466        2014

 

                    LIPID PANEL         CPT-4: 84035        2014

 

                    A1C HPLC            CPT-4: 25686        2014

 

                    VITAMIN B 12 FOLIC ACID CPT-4: 83669|42669  2014

 

                    PRESCRIP TRANSMIT VIA ERX SY CPT-4:         2014

 

                    PRESCRIP TRANSMIT VIA ERX SY CPT-4:         10/15/2013

 

                    FLUZONE, 5ML (Medicare) CPT-4:         2013

 

                    ADMIN INFLUENZA VIRUS VAC CPT-4:         2013

 

                    PRESCRIP TRANSMIT VIA ERX SY CPT-4:         2013

 

                    PRESCRIP TRANSMIT VIA ERX SY CPT-4:         05/10/2013

 

                    ROUTINE VENIPUNCTURE CPT-4: 27721        2012

 

                    ASSAY OF FREE THYROXINE CPT-4: 78254        2012

 

                    ASSAY THYROID STIM HORMONE CPT-4: 87580        2012

 

                    COMPREHEN METABOLIC PANEL CPT-4: 81603        2012

 

                    COMPLETE CBC W/AUTO DIFF WBC CPT-4: 54873        2012

 

                    LIPID PANEL         CPT-4: 00204        2012

 

                    A1C GLYCOSYLATED HEMOGLOBIN TEST CPT-4: 73159        

012

 

                    CERUM REMOVAL       CPT-4: 33505        2012

 

                    PRESCRIP TRANSMIT VIA ERX SY CPT-4:         2012

 

                    PRESCRIP TRANSMIT VIA ERX SY CPT-4:         10/10/2012

 

                    FLUZONE, 5ML (Medicare) CPT-4:         2012

 

                    ADMIN INFLUENZA VIRUS VAC CPT-4:         2012

 

                    ASSAY, GLUCOSE, BLOOD QUANT CPT-4: 28566        2012

 

                    URINALYSIS NONAUTO W/O SCOPE CPT-4: 98527        2012

 

                    URINE CULTURE/ COLONY COUNT CPT-4: 85268        2012

 

                    ROUTINE VENIPUNCTURE CPT-4: 82359        2012

 

                    ASSAY OF FREE THYROXINE CPT-4: 08211        2012

 

                    ASSAY THYROID STIM HORMONE CPT-4: 14760        2012

 

                    COMPREHEN METABOLIC PANEL CPT-4: 01071        2012

 

                    COMPLETE CBC W/AUTO DIFF WBC CPT-4: 97600        2012

 

                    LIPID PANEL         CPT-4: 48474        2012

 

                    ASSAY OF INSULIN    CPT-4: 52676        2012

 

                    A1C GLYCOSYLATED HEMOGLOBIN TEST CPT-4: 35922        

012

 

                    DRAIN/INJECT JOINT/BURSA CPT-4: 82652        2012

 

                    METHYLPREDNISOLONE 40 MG INJ CPT-4:         2012

 

                    TRIAMCINOLONE ACET INJ NOS CPT-4:         2012

 

                    PRESCRIP TRANSMIT VIA ERX SY CPT-4:         2012

 

                    PRESCRIP TRANSMIT VIA ERX SY CPT-4:         2012

 

                    METHYLPREDNISOLONE 40 MG INJ CPT-4:         2012

 

                    DRAIN/INJECT JOINT/BURSA CPT-4: 32931        2012

 

                    TRIAMCINOLONE ACET INJ NOS CPT-4:         2012

 

                    PRESCRIP TRANSMIT VIA ERX SY CPT-4:         2012

 

                    ROUTINE VENIPUNCTURE CPT-4: 17897        2012

 

                    ASSAY OF FREE THYROXINE CPT-4: 32127        2012

 

                    ASSAY THYROID STIM HORMONE CPT-4: 15652        2012

 

                    COMPREHEN METABOLIC PANEL CPT-4: 04303        2012

 

                    COMPLETE CBC W/AUTO DIFF WBC CPT-4: 08055        2012

 

                    LIPID PANEL         CPT-4: 62629        2012

 

                    PRESCRIP TRANSMIT VIA ERX SY CPT-4:         2012

 

                    CERUM REMOVAL       CPT-4: 47586        2011

 

                    PRESCRIP TRANSMIT VIA ERX SY CPT-4:         2011

 

                    FLUZONE, 5ML (Medicare) CPT-4:         10/05/2011

 

                    ADMIN INFLUENZA VIRUS VAC CPT-4:         10/05/2011

 

                    PRESCRIP TRANSMIT VIA ERX SY CPT-4:         2011

 

                    URINALYSIS NONAUTO W/O SCOPE CPT-4: 21964        2011

 

                    URINE CULTURE/ COLONY COUNT CPT-4: 20835        2011

 

                    CUR TOBACCO NON-USER CPT-4:         2011

 

                    ROUTINE VENIPUNCTURE CPT-4: 02066        2011

 

                    COMPLETE CBC W/AUTO DIFF WBC CPT-4: 74513        2011

 

                    COMPREHEN METABOLIC PANEL CPT-4: 41616        2011

 

                    LIPID PANEL         CPT-4: 59444        2011

 

                    ASSAY THYROID STIM HORMONE CPT-4: 31633        2011

 

                    ASSAY OF FREE THYROXINE CPT-4: 50319        2011

 

                    PRESCRIP TRANSMIT VIA ERX SY CPT-4:         2011

 

                    INJ TRIGGER POINT 1/2 MUSCL CPT-4: 55703        2011

 

                    TRIAMCINOLONE ACET INJ NOS CPT-4:         2011

 

                    METHYLPREDNISOLONE 40 MG INJ CPT-4:         2011

 

                    THER/PROPH/DIAG INJ SC/IM CPT-4: 04025        2011

 

                    KETOROLAC TROMETHAMINE INJ CPT-4:         2011

 

                    PRESCRIP TRANSMIT VIA ERX SY CPT-4:         2010

 

                    FLU VACCINE 3 YRS & > IM UP 64 CPT-4: 80691        10/06/201

0

 

                    ADMIN INFLUENZA VIRUS VAC CPT-4:         10/06/2010

 

                    URINALYSIS NONAUTO W/O SCOPE CPT-4: 93325        2010

 

                    URINE CULTURE/ COLONY COUNT CPT-4: 98428        2010

 

                    PRESCRIP TRANSMIT VIA ERX SY CPT-4:         2010

 

                    THER/PROPH/DIAG INJ SC/IM CPT-4: 03444        2010

 

                    VITAMIN B12 INJECTION CPT-4:         2010

 

                    THER/PROPH/DIAG INJ SC/IM CPT-4: 65890        2010

 

                    VITAMIN B12 INJECTION CPT-4:         2010

 

                    ROUTINE VENIPUNCTURE CPT-4: 77246        2010







Vital Signs





                          Date                      Vital

 

                2020      Blood Pressure 1: 148/66 Code: 8480-6 Heart Rate

 1: 56 bpm 

Respiratory Rate: 20 bpm SpO2: 99%           Temperature: 36.6 (C) / 97.8 (F) We

ight: 211 

lbs 

 

                2020      Blood Pressure 1: 138/64 Code: 8480-6 Heart Rate

 1: 52 bpm 

Respiratory Rate: 18 bpm  SpO2: 98%                 Temperature: 36.3 (C) / 97.4

 (F)

 

                    2020          Blood Pressure 1: 144/82 Code: 8480-6 He

art Rate 1: 56 bpm

 

                2020      Blood Pressure 1: 154/86 Code: 8480-6 Heart Rate

 1: 64 bpm 

Respiratory Rate: 20 bpm SpO2: 99%           Temperature: 37.1 (C) / 98.7 (F) We

ight: 215 

lbs 

 

             2019   Blood Pressure 1: 140/70 Code: 8480-6 Heart Rate 1: 57

 bpm SpO2: 99%    

Temperature: 35.9 (C) / 96.6 (F)        Weight: 215 lbs 

 

                06/10/2019      Blood Pressure 1: 144/70 Code: 8480-6 Heart Rate

 1: 60 bpm 

Respiratory Rate: 20 bpm SpO2: 95%           Temperature: 36.4 (C) / 97.6 (F) We

ight: 214 

lbs 

 

                2019      Blood Pressure 1: 128/78 Code: 8480-6 Heart Rate

 1: 56 bpm 

Respiratory Rate: 20 bpm SpO2: 98%           Temperature: 36.3 (C) / 97.4 (F) We

ight: 213 

lbs 

 

                2018      Blood Pressure 1: 142/60 Code: 8480-6 BMI: 38.2 

Code: 72603-2 Heart 

Rate 1: 48 bpm  Height: 5'2"    Respiratory Rate: 20 bpm SpO2: 98%       Tempera

ture: 36.7

(C) / 98.1 (F)                          Weight: 212 lbs 

 

                2018      Blood Pressure 1: 142/64 Code: 8480-6 BMI: 38.5 

Code: 69959-6 Heart 

Rate 1: 52 bpm  Height: 5'2"    Respiratory Rate: 22 bpm SpO2: 96%       Tempera

ture: 36.1

(C) / 96.9 (F)                          Weight: 214 lbs 

 

                10/02/2018      Blood Pressure 1: 124/80 Code: 8480-6 BMI: 38.3 

Code: 62204-5 Heart 

Rate 1: 68 bpm      Height: 5'2"        Respiratory Rate: 20 bpm Temperature: 36

.3 (C) / 

97.4 (F)                                Weight: 213 lbs 

 

                2018      Blood Pressure 1: 132/78 Code: 8480-6 BMI: 37.6 

Code: 63096-1 Heart 

Rate 1: 68 bpm  Height: 5'2"    Respiratory Rate: 20 bpm SpO2: 97%       Tempera

ture: 36.8

(C) / 98.2 (F)                          Weight: 209 lbs 

 

                2018      Blood Pressure 1: 150/76 Code: 8480-6 BMI: 38.5 

Code: 55851-0 Heart 

Rate 1: 64 bpm  Height: 5'2"    Respiratory Rate: 20 bpm SpO2: 97%       Tempera

ture: 36.2

(C) / 97.2 (F)                          Weight: 214 lbs 

 

                2018      Blood Pressure 1: 122/74 Code: 8480-6 BMI: 38.2 

Code: 05795-5 Heart 

Rate 1: 64 bpm  Height: 5'2"    Respiratory Rate: 18 bpm SpO2: 96%       Tempera

ture: 35.8

(C) / 96.4 (F)                          Weight: 212 lbs 

 

                2018      Blood Pressure 1: 124/78 Code: 8480-6 BMI: 37.8 

Code: 27156-5 Heart 

Rate 1: 76 bpm      Height: 5'2"        Respiratory Rate: 20 bpm Temperature: 36

.8 (C) / 

98.3 (F)                                Weight: 210 lbs 

 

                2018      Blood Pressure 1: 136/70 Code: 8480-6 BMI: 38.0 

Code: 47565-6 Heart 

Rate 1: 68 bpm  Height: 5'2"    Respiratory Rate: 20 bpm SpO2: 97%       Tempera

ture: 36.8

(C) / 98.2 (F)                          Weight: 211 lbs 

 

                2018      Blood Pressure 1: 140/65 Code: 8480-6 Heart Rate

 1: 75 bpm 

Respiratory Rate: 24 bpm SpO2: 95%           Temperature: 37.0 (C) / 98.6 (F) We

ight: 211 

lbs 

 

                2018      Blood Pressure 1: 154/70 Code: 8480-6 BMI: 37.6 

Code: 26558-1 Heart 

Rate 1: 76 bpm  Height: 5'2"    Respiratory Rate: 20 bpm SpO2: 98%       Tempera

ture: 36.9

(C) / 98.5 (F)                          Weight: 209 lbs 

 

                2017      Blood Pressure 1: 156/70 Code: 8480-6 BMI: 37.1 

Code: 75144-7 Heart 

Rate 1: 72 bpm  Height: 5'2"    Respiratory Rate: 20 bpm SpO2: 97%       Tempera

ture: 37.0

(C) / 98.6 (F)                          Weight: 206 lbs 

 

                2017      Blood Pressure 1: 152/78 Code: 8480-6 BMI: 36.8 

Code: 81602-1 Heart 

Rate 1: 78 bpm  Height: 5'2"    Respiratory Rate: 20 bpm SpO2: 98%       Tempera

ture: 36.1

(C) / 97.0 (F)                          Weight: 204 lbs 

 

                2017      Blood Pressure 1: 142/70 Code: 8480-6 BMI: 36.9 

Code: 13720-0 Heart 

Rate 1: 64 bpm  Height: 5'2"    Respiratory Rate: 20 bpm SpO2: 96%       Tempera

ture: 36.5

(C) / 97.7 (F)                          Weight: 205 lbs 

 

                2017      Blood Pressure 1: 142/68 Code: 8480-6 Heart Rate

 1: 88 bpm 

Respiratory Rate: 18 bpm SpO2: 98%           Temperature: 36.3 (C) / 97.3 (F) We

ight: 209 

lbs 

 

                2016      Blood Pressure 1: 130/78 Code: 8480-6 Heart Rate

 1: 68 bpm 

Respiratory Rate: 20 bpm SpO2: 95%           Temperature: 36.4 (C) / 97.6 (F) We

ight: 212 

lbs 

 

                2016      Blood Pressure 1: 122/64 Code: 8480-6 BMI: 39.1 

Code: 18114-6 Heart 

Rate 1: 76 bpm      Height: 5'2"        Respiratory Rate: 20 bpm Temperature: 36

.8 (C) / 

98.2 (F)                                Weight: 217 lbs 

 

                2016      Blood Pressure 1: 144/70 Code: 8480-6 BMI: 39.4 

Code: 09013-5 Heart 

Rate 1: 76 bpm      Height: 5'2"        Respiratory Rate: 20 bpm Temperature: 36

.6 (C) / 

97.9 (F)                                Weight: 219 lbs 

 

                2015      Blood Pressure 1: 152/60 Code: 8480-6 BMI: 39.6 

Code: 50473-5 Heart 

Rate 1: 84 bpm      Height: 5'2"        Respiratory Rate: 20 bpm Temperature: 37

.0 (C) / 

98.6 (F)                                Weight: 220 lbs 

 

                2015      Blood Pressure 1: 146/76 Code: 8480-6 BMI: 39.8 

Code: 97368-8 Heart 

Rate 1: 88 bpm      Height: 5'2"        Respiratory Rate: 20 bpm Temperature: 37

.0 (C) / 

98.6 (F)                                Weight: 221 lbs 

 

                2015      Blood Pressure 1: 132/70 Code: 8480-6 BMI: 39.1 

Code: 93438-2 Heart 

Rate 1: 88 bpm      Height: 5'2"        Respiratory Rate: 20 bpm Temperature: 36

.4 (C) / 

97.6 (F)                                Weight: 217 lbs 

 

                2015      Blood Pressure 1: 132/66 Code: 8480-6 BMI: 39.9 

Code: 22535-3 Heart 

Rate 1: 72 bpm      Height: 5'2"        Respiratory Rate: 20 bpm Temperature: 36

.9 (C) / 

98.4 (F)                                Weight: 218 lbs 

 

                2015      Blood Pressure 1: 136/80 Code: 8480-6 Heart Rate

 1: 76 bpm 

Respiratory Rate: 20 bpm  Temperature: 36.7 (C) / 98.0 (F) Weight: 224 lbs 

 

                2015      Blood Pressure 1: 134/78 Code: 8480-6 BMI: 39.7 

Code: 91663-2 Heart 

Rate 1: 84 bpm      Height: 5'2"        Respiratory Rate: 20 bpm Temperature: 36

.7 (C) / 

98.0 (F)                                Weight: 217 lbs 

 

                2014      Blood Pressure 1: 146/78 Code: 8480-6 BMI: 39.5 

Code: 46497-6 Heart 

Rate 1: 82 bpm      Height: 5'2"        Respiratory Rate: 18 bpm Temperature: 35

.6 (C) / 

96.1 (F)                                Weight: 216 lbs 

 

                2014      Blood Pressure 1: 134/70 Code: 8480-6 BMI: 37.9 

Code: 60433-1 Heart 

Rate 1: 80 bpm      Height: 5'3"        Respiratory Rate: 20 bpm Temperature: 36

.8 (C) / 

98.2 (F)                                Weight: 214 lbs 

 

                2014      Blood Pressure 1: 132/70 Code: 8480-6 BMI: 37.6 

Code: 53314-9 Heart 

Rate 1: 80 bpm      Height: 5'3"        Respiratory Rate: 20 bpm Temperature: 36

.8 (C) / 

98.3 (F)                                Weight: 212 lbs 

 

                2014      Blood Pressure 1: 116/74 Code: 8480-6 Heart Rate

 1: 68 bpm 

Respiratory Rate: 20 bpm  Temperature: 36.2 (C) / 97.1 (F) Weight: 212 lbs 

 

                10/15/2013      Blood Pressure 1: 132/82 Code: 8480-6 BMI: 37.4 

Code: 14367-8 Heart 

Rate 1: 76 bpm      Height: 5'3"        Respiratory Rate: 20 bpm Temperature: 36

.7 (C) / 

98.0 (F)                                Weight: 211 lbs 

 

                    2013          Blood Pressure 1: 130/76 Code: 8480-6 He

art Rate 1: 78 bpm

 

                2013      Blood Pressure 1: 140/82 Code: 8480-6 BMI: 36.8 

Code: 46697-3 Heart 

Rate 1: 66 bpm      Height: 5'3"        Respiratory Rate: 20 bpm Temperature: 36

.1 (C) / 

96.9 (F)                                Weight: 208 lbs 

 

                2013      Blood Pressure 1: 138/80 Code: 8480-6 BMI: 36.4 

Code: 71410-5 Heart 

Rate 1: 72 bpm      Height: 5'4"        Respiratory Rate: 20 bpm Temperature: 36

.7 (C) / 

98.0 (F)                                Weight: 212 lbs 

 

                2013      Blood Pressure 1: 124/70 Code: 8480-6 BMI: 36.9 

Code: 71490-2 Heart 

Rate 1: 60 bpm      Height: 5'4"        Temperature: 36.1 (C) / 97.0 (F) Weight:

 215 lbs 

 

                05/10/2013      Blood Pressure 1: 132/86 Code: 8480-6 BMI: 36.9 

Code: 62983-5 Heart 

Rate 1: 76 bpm      Height: 5'4"        Respiratory Rate: 20 bpm Temperature: 36

.8 (C) / 

98.2 (F)                                Weight: 215 lbs 

 

                2013      Blood Pressure 1: 134/82 Code: 8480-6 BMI: 36.6 

Code: 78265-2 Heart 

Rate 1: 72 bpm      Height: 5'4"        Respiratory Rate: 20 bpm Temperature: 36

.3 (C) / 

97.4 (F)                                Weight: 213 lbs 

 

                2012      Blood Pressure 1: 142/80 Code: 8480-6 BMI: 37.1 

Code: 60918-0 Heart 

Rate 1: 76 bpm      Height: 5'4"        Respiratory Rate: 20 bpm Temperature: 36

.8 (C) / 

98.3 (F)                                Weight: 216 lbs 

 

                10/23/2012      Blood Pressure 1: 128/68 Code: 8480-6 BMI: 37.6 

Code: 62359-8 Heart 

Rate 1: 72 bpm      Height: 5'4"        Respiratory Rate: 20 bpm Temperature: 36

.6 (C) / 

97.9 (F)                                Weight: 219 lbs 

 

                10/10/2012      Blood Pressure 1: 122/70 Code: 8480-6 Heart Rate

 1: 76 bpm 

Respiratory Rate: 20 bpm  Temperature: 36.7 (C) / 98.1 (F) Weight: 218 lbs 

 

                    2012          Blood Pressure 1: 132/80 Code: 8480-6 He

art Rate 1: 84 bpm

 

                2012      Blood Pressure 1: 128/78 Code: 8480-6 BMI: 38.1 

Code: 83110-2 Heart 

Rate 1: 84 bpm      Height: 5'4"        Respiratory Rate: 20 bpm Temperature: 36

.9 (C) / 

98.4 (F)                                Weight: 222 lbs 

 

                2012      Blood Pressure 1: 138/80 Code: 8480-6 BMI: 37.9 

Code: 53162-0 Heart 

Rate 1: 74 bpm      Height: 5'4"        Temperature: 36.1 (C) / 97.0 (F) Weight:

 221 lbs 

 

                2012      Blood Pressure 1: 126/78 Code: 8480-6 BMI: 38.4 

Code: 31905-7 Heart 

Rate 1: 72 bpm      Height: 5'4"        Respiratory Rate: 20 bpm Temperature: 36

.7 (C) / 

98.0 (F)                                Weight: 224 lbs 

 

                2012      Blood Pressure 1: 138/72 Code: 8480-6 BMI: 38.4 

Code: 43289-8 Heart 

Rate 1: 72 bpm      Height: 5'4"        Respiratory Rate: 20 bpm Temperature: 36

.6 (C) / 

97.9 (F)                                Weight: 224 lbs 

 

                2012      Blood Pressure 1: 122/78 Code: 8480-6 BMI: 38.8 

Code: 15668-0 Heart 

Rate 1: 88 bpm      Height: 5'4"        Respiratory Rate: 20 bpm Temperature: 36

.6 (C) / 

97.8 (F)                                Weight: 226 lbs 

 

                2012      Blood Pressure 1: 116/60 Code: 8480-6 BMI: 38.1 

Code: 65111-8 Heart 

Rate 1: 92 bpm      Height: 5'4"        Respiratory Rate: 20 bpm Temperature: 36

.8 (C) / 

98.2 (F)                                Weight: 222 lbs 

 

                2011      Blood Pressure 1: 118/62 Code: 8480-6 BMI: 37.9 

Code: 46788-7 Heart 

Rate 1: 80 bpm      Height: 5'4"        Temperature: 36.5 (C) / 97.7 (F) Weight:

 221 lbs 

 

                2011      Blood Pressure 1: 132/70 Code: 8480-6 Heart Rate

 1: 84 bpm 

Respiratory Rate: 20 bpm  Temperature: 36.7 (C) / 98.0 (F) Weight: 221 lbs 

 

                2011      Blood Pressure 1: 114/72 Code: 8480-6 BMI: 37.6 

Code: 74008-1 Heart 

Rate 1: 76 bpm      Height: 5'4"        Respiratory Rate: 20 bpm Temperature: 36

.8 (C) / 

98.3 (F)                                Weight: 219 lbs 

 

                    2011          Blood Pressure 1: 136/76 Code: 8480-6 Te

mperature: 36.0 (C) / 96.8 

(F)                                     Weight: 219 lbs 8 oz

 

                2011      Blood Pressure 1: 128/80 Code: 8480-6 Heart Rate

 1: 72 bpm 

Temperature: 36.3 (C) / 97.4 (F)        Weight: 218 lbs 

 

                2011      Blood Pressure 1: 126/70 Code: 8480-6 Heart Rate

 1: 72 bpm 

Temperature: 36.7 (C) / 98.0 (F)

 

                    2010          Blood Pressure 1: 126/78 Code: 8480-6 Te

mperature: 36.2 (C) / 97.1 

(F)                                     Weight: 217 lbs 8 oz

 

                2010      Blood Pressure 1: 116/70 Code: 8480-6 Heart Rate

 1: 72 bpm 

Temperature: 36.4 (C) / 97.6 (F)        Weight: 222 lbs 

 

                2010      Blood Pressure 1: 114/78 Code: 8480-6 Heart Rate

 1: 72 bpm 

Temperature: 37.1 (C) / 98.8 (F)        Weight: 225 lbs 

 

                2010      Blood Pressure 1: 128/78 Code: 8480-6 Heart Rate

 1: 76 bpm 

Temperature: 36.6 (C) / 97.8 (F)        Weight: 224 lbs 

 

                2010      Blood Pressure 1: 116/78 Code: 8480-6 Heart Rate

 1: 80 bpm 

Temperature: 36.4 (C) / 97.6 (F)        Weight: 226 lbs 

 

                2010      Blood Pressure 1: 120/70 Code: 8480-6 BMI: 38.3 

Code: 18052-9 Heart 

Rate 1: 88 bpm      Height: 5'5"        Temperature: 36.4 (C) / 97.6 (F) Weight:

 230 lbs 







Functional Status

No Functional Status data



Reason For Visit





                    Reason For Visit    Effective Dates     Notes

 

                    follow up           2020           

 

                    follow up           2020           

 

                    blood pressure check 2020           

 

                    high blood pressure 2020           

 

                    lab draw            2019           

 

                    lab draw            10/11/2019           

 

                    hearing loss        2019          left ear

 

                    follow up           06/10/2019           

 

                    follow up           2019          Patient has upcoming

 appointment with Dr Claire and carotid 

dopplers tomorrow

 

                    follow up           2018          Patient had heart ca

th on 10-30-18 and one of the bypass 

veins in spasming

 

                    follow up           2018          4 Week

 

                    follow up           10/02/2018           

 

                    follow up           2018           

 

                    high blood pressure 2018          Dr Claire has ordered

 holter monitor and 

hydralazine 50mg PRN

 

                    dizziness           2018          On  morning darren delvalle woke up and went to the bathroom 

and after lying down became very dizzy. Patient has hx of vertigo so didn't 
think anything of it. She usually just has them intermittently, but had more 
that day. While at [employer] began to feel very ill and became very shaky. She got 
home and sat in the recliner and had the jittery/nervous feeling. Later that 
night it finally resolved. Patient feels like she had a spell like this around 
the  and thought it was due to extreme heat exhaustion.

 

                    follow up           2018           

 

                    back pain           2018           

 

                    otalgia             2018           

 

                    back pain           2018           

 

                    injection(s)        10/06/2017          flu shot

 

                    lab draw            2017           

 

                    follow up           2017           

 

                    pelvic pain         2017          Patient states pain 

started in May with a "Constant Ache"

to left inguinal area. Patient states at that time pain was intermittent. Then, 
approximately 2nd week  of  pain worsened and radiates to left low back 
area.

 

                    lab draw            2017           

 

                    hyperglycemia       2017           

 

                    cough               2017           

 

                    otalgia             2016           

 

                    injection(s)        10/07/2016          Influenza

 

                    lab draw            2016           

 

                    constipation        2016           

 

                    flank pain          2016           

 

                    follow up           2015          3mo fwup

 

                    injection(s)        10/16/2015          Influenza

 

                    acute renal failure 09/15/2015           

 

                    lab draw            09/10/2015           

 

                    fatigue             2015           

 

                    otalgia             2015           

 

                    pain, limb          2015           

 

                    follow up           2015          Hospital fwup

 

                    high blood pressure 2015           

 

                    injection(s)        10/17/2014          flu shot

 

                    sinusitis           2014           

 

                    high blood pressure 2014           

 

                    cough               2014          Was panfilo at Dr Tyree teixeira's office so he started he on amlodipine 

10mg but seems to be dragging her down. Patient decreased to 1/2 tablet this 
last week

 

                    lab draw            2014           

 

                    cough               2014           

 

                    cough               10/15/2013           

 

                    high blood pressure 2013           

 

                    follow up           2013          ER

 

                    dizziness           2013           

 

                    follow up           2013           

 

                    otalgia             05/10/2013           

 

                    breast complaint    2013           

 

                    lab draw            2012           

 

                    follow up           2012          2mo fwup

 

                    follow up           10/23/2012          2wk fwup

 

                    gas and bloating    10/10/2012          Dr Claire has her mon

itoring because was high in his 

office at last visit

 

                    injection(s)        2012           

 

                    dizziness           2012           

 

                    dizziness           2012           

 

                    lab draw            2012           

 

                    muscle weakness     2012          concerns of simvasta

tin with fatigue and muscle 

weakness

 

                    leg pain/sciatica   2012           

 

                    hip pain            2012           

 

                    back pain           2012          has tried ice pack, 

muscle relaxers, and moist heat

 

                    back pain           2012           

 

                    fatigue             2012          in hands/feet

 

                    otalgia             2011           

 

                    follow up           2011          2wk fwup

 

                    back pain           2011          thinks ulcer may hav

e returned

 

                    lab draw            2011           

 

                    dizziness           2011          Left ear and facial 

pain, right ear pain 

 

                    follow up           2011          1wk fwup

 

                    hip pain            2011          feels very draggy, f

atigue, BP 80/63, normally running 

100's/60's

 

                    chills              2010           

 

                    injection(s)        10/06/2010          flu shot

 

                    follow up           2010          6wk fwup, had HH rep

aired and is starting to feel better, 

started on reglan 10mg tid

 

                    follow up           2010          hosp fwup

 

                    follow up           2010          1mo fwup, saw Dr Danna du and hasn't gave cardiac clearance 

yet for HH repair, did have chemical stress test yesterday and waiting on 
results

 

                    follow up           2010          from EGD/colonoscopy

--has Hiatal Hernia and wants to do 

repair

 

                    follow up           2010           







Encounters





             Encounter    Performer    Location     Codes        Date

 

                                        ([id number]) OFFICE/OUTPATIENT VISIT EST

Diagnosis: Essential (primary) hypertension[ICD10: I10]

Diagnosis: Generalized anxiety disorder[ICD10: F41.1] Akanksha DRIVER DO [facility] CPT-4: 20292              2020

 

                                        (06339) OFFICE/OUTPATIENT VISIT EST

Diagnosis: Essential (primary) hypertension[ICD10: I10]

Diagnosis: Palpitations[ICD10: R00.2] Akanksha GUNN 

[facility]                    CPT-4: 18202              2020

 

                                        (99290) OFFICE/OUTPATIENT VISIT EST

Diagnosis: Essential hypertension[ICD10: I10]

Diagnosis: Palpitations[ICD10: R00.2]

Diagnosis: Stress reaction[ICD10: F43.0] Akanksha DRIVER [facility]            CPT-4: 12744              2020

 

                                        ([id number]) NURSE/OUTPATIENT VISIT EST

Diagnosis: Mixed hyperlipidemia[ICD10: E78.2] [facility]            [facility]: 89174              2019

 

                                        (77794) NURSE/OUTPATIENT VISIT EST

Diagnosis: FLU VACCINE[ICD10: Z23] Akanksha KEY [facility]                       CPT-4: 07598              10/22/2019

 

                                        (64217) NURSE/OUTPATIENT VISIT EST

Diagnosis: Chronic kidney disease, stage 1[ICD10: N18.1] Akanksha DRIVER [facility] CPT-4: 34892              10/11/2019

 

                                        (12829) OFFICE/OUTPATIENT VISIT EST

Diagnosis: Acute serous otitis media, left ear[ICD10: H65.02] Nat DRIVER [facility] CPT-4: 58437              2019

 

                                        (00951) OFFICE/OUTPATIENT VISIT EST

Diagnosis: Essential (primary) hypertension[ICD10: I10]

Diagnosis: Coronary atherosclerosis due to calcified coronary lesion[ICD10: 
I25.84]

Diagnosis: Hypoglycemia, unspecified[ICD10: E16.2]

Diagnosis: Other fatigue[ICD10: R53.83]

Diagnosis: Urinary tract infection, site not specified[ICD10: N39.0] [facility] 84974        06/10/2019

 

                                        (72470) OFFICE/OUTPATIENT VISIT EST

Diagnosis: Essential (primary) hypertension[ICD10: I10]

Diagnosis: Gastro-esophageal reflux disease without esophagitis[ICD10: K21.9]

Diagnosis: Urinary tract infection, site not specified[ICD10: N39.0] Akanksha DRIVER DO [facility] CPT-4: 93002        2019

 

                                        (31122) OFFICE/OUTPATIENT VISIT EST

Diagnosis: Gastro-esophageal reflux disease without esophagitis[ICD10: K21.9]

Diagnosis: Epigastric pain[ICD10: R10.13] [facility]            CPT-4: 67645              2018

 

                                        (35831) OFFICE/OUTPATIENT VISIT EST

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

Diagnosis: Essential (primary) hypertension[ICD10: I10]

Diagnosis: Generalized anxiety disorder[ICD10: F41.1]

Diagnosis: Gastro-esophageal reflux disease without esophagitis[ICD10: K21.9] 

Akanksha DRIVER [facility] CPT-4: 13615        2018

 

                                        OFFICE/OUTPATIENT VISIT EST

Diagnosis: Gastro-esophageal reflux disease without esophagitis[ICD10: K21.9]

Diagnosis: Palpitations[ICD10: R00.2]

Diagnosis: Urinary tract infection, site not specified[ICD10: N39.0]

Diagnosis: Generalized anxiety disorder[ICD10: F41.1] Akanksha DRIVER [facility] CPT-4: 93113              10/02/2018

 

                                        (47998) NURSE/OUTPATIENT VISIT EST

Diagnosis: Coronary atherosclerosis due to calcified coronary lesion[ICD10: 
I25.84]

Diagnosis: Hypoglycemia, unspecified[ICD10: E16.2]

Diagnosis: Dizziness and giddiness[ICD10: R42]

Diagnosis: Occlusion and stenosis of bilateral carotid arteries[ICD10: I65.23] 

Akanksha DRIVER DO [facility] CPT-4: 41240        2018

 

                                        OFFICE/OUTPATIENT VISIT EST

Diagnosis: Epigastric pain[ICD10: R10.13]

Diagnosis: Generalized anxiety disorder[ICD10: F41.1]

Diagnosis: FLU VACCINE[ICD10: Z23] Akanksha SPENCER

ER [facility]                       CPT-4: 17764              2018

 

                                        (88594) OFFICE/OUTPATIENT VISIT EST

Diagnosis: Essential (primary) hypertension[ICD10: I10]

Diagnosis: Hypoglycemia, unspecified[ICD10: E16.2]

Diagnosis: Gastro-esophageal reflux disease without esophagitis[ICD10: K21.9] 

Akanksha Otoolendangela DRIVER [facility] CPT-4: 76314        2018

 

                                        (12462) OFFICE/OUTPATIENT VISIT EST

Diagnosis: Dizziness and giddiness[ICD10: R42] Nat DRIVER DO [facility]            CPT-4: 66029              2018

 

                                        (34353) OFFICE/OUTPATIENT VISIT EST

Diagnosis: Cervicalgia[ICD10: M54.2]

Diagnosis: Other spondylosis with radiculopathy, cervical region[ICD10: M47.22] 

Akanksha Xiangndangela DRIVER [facility] CPT-4: 75079        2018

 

                                        (69939) OFFICE/OUTPATIENT VISIT EST

Diagnosis: Hypoglycemia, unspecified[ICD10: E16.2]

Diagnosis: Other spondylosis with radiculopathy, cervical region[ICD10: M47.22]

Diagnosis: Vertigo of central origin, bilateral[ICD10: H81.43]

Diagnosis: Cervicocranial syndrome[ICD10: M53.0] Akanksha Xiangndangela DRIVER [facility]         CPT-4: 81044              2018

 

                                        (60453) OFFICE/OUTPATIENT VISIT EST

Diagnosis: Benign paroxysmal vertigo, bilateral[ICD10: H81.13]

Diagnosis: Otalgia, bilateral[ICD10: H92.03] Nat DRIVER [facility]            CPT-4: 99205              2018

 

                                        (07331) OFFICE/OUTPATIENT VISIT EST

Diagnosis: Low back pain[ICD10: M54.5]

Diagnosis: Radiculopathy, lumbosacral region[ICD10: M54.17]

Diagnosis: Left lower quadrant pain[ICD10: R10.32] Akanksha Driver        DORITA IZQUIERDOMARCY

OLIVIA DRIVER [facility]         CPT-4: 25835              2018

 

                                        (30032) OFFICE/OUTPATIENT VISIT EST

Diagnosis: FLU VACCINE[ICD10: Z23] Akanksha KEY [facility]                       CPT-4: 90326              10/06/2017

 

                                        (78054) OFFICE/OUTPATIENT VISIT EST

Diagnosis: Mixed hyperlipidemia[ICD10: E78.2]

Diagnosis: Essential (primary) hypertension[ICD10: I10]

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

Diagnosis: Impaired fasting glucose[ICD10: R73.01]

Diagnosis: Other fatigue[ICD10: R53.83] Akanksha DRIVER

[facility]                    CPT-4: 38776              2017

 

                                        (29409) OFFICE/OUTPATIENT VISIT EST

Diagnosis: Pain in thoracic spine[ICD10: M54.6]

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 

Akanksha HIGHTOWERLINE OLIVIA DRIVER [facility] CPT-4: 39173        2017

 

                                        (68546) OFFICE/OUTPATIENT VISIT EST

Diagnosis: Left lower quadrant pain[ICD10: R10.32]

Diagnosis: Low back pain[ICD10: M54.5] Akanksha HIGHTOWERLINE OLIVIA PAINTER

MONY 

[facility]                    CPT-4: 25337              2017

 

                                        (27327) OFFICE/OUTPATIENT VISIT EST

Diagnosis: Mixed hyperlipidemia[ICD10: E78.2]

Diagnosis: Essential (primary) hypertension[ICD10: I10]

Diagnosis: Hypoglycemia, unspecified[ICD10: E16.2] Akanksha Xiangndangela IZQUIERDOMARCY

[facility]         CPT-4: 71123              2017

 

                                        (91445) OFFICE/OUTPATIENT VISIT EST

Diagnosis: Impaired fasting glucose[ICD10: R73.01]

Diagnosis: Mastodynia[ICD10: N64.4]

Diagnosis: Mixed hyperlipidemia[ICD10: E78.2]

Diagnosis: Essential (primary) hypertension[ICD10: I10]

Diagnosis: Other fatigue[ICD10: R53.83] Akanksha Xiangrodo BAUMAN SAramis 

TJ

[facility]                    CPT-4: 66510              2017

 

                                        (77562) OFFICE/OUTPATIENT VISIT EST

Diagnosis: Acute upper respiratory infection, unspecified[ICD10: J06.9] Luba DRIVER [facility] CPT-4: 67854        2017

 

                                        (92879) OFFICE/OUTPATIENT VISIT EST

Diagnosis: Acute mastoiditis without complications, right ear[ICD10: H70.001] 

Akanksha DRIVER DO [facility] CPT-4: 78940        2016

 

                                        (09974) OFFICE/OUTPATIENT VISIT EST

Diagnosis: FLU VACCINE[ICD10: Z23] Akanksha KEY [facility]                       CPT-4: 35694              10/07/2016

 

                                        (19513) OFFICE/OUTPATIENT VISIT EST

Diagnosis: Mixed hyperlipidemia[ICD10: E78.2]

Diagnosis: Essential (primary) hypertension[ICD10: I10]

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

Diagnosis: Impaired fasting glucose[ICD10: R73.01] Akanksha DRIVER [facility]         CPT-4: 66026              2016

 

                                        (61704) OFFICE/OUTPATIENT VISIT EST

Diagnosis: Constipation, unspecified[ICD10: K59.00] Akanksha DRIVER [facility] CPT-4: 70628              2016

 

                                        (82527) OFFICE/OUTPATIENT VISIT EST

Diagnosis: Essential (primary) hypertension[ICD10: I10]

Diagnosis: Mixed hyperlipidemia[ICD10: E78.2]

Diagnosis: Chronic kidney disease, stage 1[ICD10: N18.1] Akanksha DRIVER [facility] CPT-4: 03760              2016

 

                                        (07443) OFFICE/OUTPATIENT VISIT EST

Diagnosis: Muscle weakness (generalized)[ICD10: M62.81]

Diagnosis: Dizziness and giddiness[ICD10: R42]

Diagnosis: Essential (primary) hypertension[ICD10: I10]

Diagnosis: History of falling[ICD10: Z91.81] Akanksha Xiangrodo        [place] DRIVER [facility]            CPT-4: 23419              2015

 

                                        (18550) OFFICE/OUTPATIENT VISIT EST

Diagnosis: FLU VACCINE[ICD10: Z23] Akanksha HIGHTOWERLINE OLIVIA KEY DO 

[facility]                       CPT-4: 34989              10/16/2015

 

                                        (92412) OFFICE/OUTPATIENT VISIT EST

Diagnosis: Acute renal failure[ICD9: 584.9]

Diagnosis: POLYURIA[ICD9: 788.42] Akanksha HIGHTOWERLINE OLIVIA LANCE DO 

[facility]                       CPT-4: 94091              09/15/2015

 

                                        (36867) OFFICE/OUTPATIENT VISIT EST

Diagnosis: HYPERLIPIDEMIA NEC/NOS[ICD9: 272.4]

Diagnosis: HYPERTENSION[ICD9: 401.9]

Diagnosis: CAD[ICD9: 414.00]

Diagnosis: Hyperglycemia[ICD9: 790.29]

Diagnosis: MALAISE AND FATIGUE[ICD9: 780.79] Akanksha Otoolerodo        [place]

SHERRY DRIVER [facility]            CPT-4: 52906              09/10/2015

 

                                        (40276) OFFICE/OUTPATIENT VISIT EST

Diagnosis: MALAISE AND FATIGUE[ICD9: 780.79]

Diagnosis: HYPOGLYCEMIA[ICD9: 251.2]

Diagnosis: Grieving[ICD9: 309.0]

Diagnosis: ABDOMINAL PAIN[ICD9: 789.00] Akanksha DRIVER

[facility]                    CPT-4: 80379              2015

 

                                        OFFICE/OUTPATIENT VISIT EST

Diagnosis: CERUMEN IMPACTION[ICD9: 380.4]

Diagnosis: EUSTACHIAN TUBE DYSFUNCTION[ICD9: 381.81] Berthasa Mon      

AKANKSHA DRIVER DO [facility] CPT-4: 46818              2015

 

                                        (66982) OFFICE/OUTPATIENT VISIT EST

Diagnosis: Leg pain[ICD9: 729.5]

Diagnosis: SUPERFIC PHLEBITIS-LEG[ICD9: 451.0] Akanksha Xiangrodo ROBERTSON OLIVIA DRIVER [facility]            CPT-4: 82046              2015

 

                                        (47638) OFFICE/OUTPATIENT VISIT EST

Diagnosis: Thoracic back pain[ICD9: 724.1]

Diagnosis: SPASM OF MUSCLE[ICD9: 728.85] Akanksha HIGHTOWERLINE OLIVIA DRIVER [facility]            CPT-4: 25364              2015

 

                                        (07130) OFFICE/OUTPATIENT VISIT EST

Diagnosis: DIZZINESS/VERTIGO[ICD9: 780.4]

Diagnosis: Benign positional vertigo[ICD9: 386.11]

Diagnosis: HYPERTENSION[ICD9: 401.9]

Diagnosis: Suspicious nevus[ICD9: 238.2] Akanksha DRIVER [facility]            CPT-4: 92684              2015

 

                                        (52834) OFFICE/OUTPATIENT VISIT EST

Diagnosis: FLU VACCINE[ICD10: Z23] Akanksha SPENCER

[facility]                       CPT-4: 69785              10/17/2014

 

                                        OFFICE/OUTPATIENT VISIT EST

Diagnosis: SINUSITIS, ACUTE[ICD9: 461.9]

Diagnosis: EUSTACHIAN TUBE DYSFUNCTION[ICD9: 381.81] Bertha DRIVER [facility] CPT-4: 30664              2014

 

                                        (16907) OFFICE/OUTPATIENT VISIT EST

Diagnosis: DIZZINESS/VERTIGO[ICD9: 780.4]

Diagnosis: HYPERTENSION[ICD9: 401.9] [facility]                       CPT-[facility]: 51040              2014

 

                                        (34753) OFFICE/OUTPATIENT VISIT EST

Diagnosis: HYPERTENSION[ICD9: 401.9]

Diagnosis: MALAISE AND FATIGUE[ICD9: 780.79]

Diagnosis: SINUSITIS, ACUTE[ICD9: 461.9] [facility]            CPT-4: 09491              2014

 

                                        (64659) OFFICE/OUTPATIENT VISIT EST

Diagnosis: HYPERLIPIDEMIA NEC/NOS[ICD9: 272.4]

Diagnosis: HYPERTENSION[ICD9: 401.9]

Diagnosis: B12 DEFIC ANEMIA NEC[ICD9: 281.1]

Diagnosis: HYPOGLYCEMIA[ICD9: 251.2] Akanksha MEJIA [facility]                       CPT-4: 08734              2014

 

                                        OFFICE/OUTPATIENT VISIT EST

Diagnosis: COUGH[ICD9: 786.2] Bertha DRIVER [facility] 

CPT-4: 93171                            2014

 

                                        OFFICE/OUTPATIENT VISIT EST

Diagnosis: COUGH[ICD9: 786.2]

Diagnosis: URI, ACUTE[ICD9: 465.9] Bertha SPENCER

[facility]                       CPT-4: 04608              10/15/2013

 

                                        (51817) OFFICE/OUTPATIENT VISIT EST

Diagnosis: HYPERTENSION[ICD9: 401.9]

Diagnosis: CEPHALGIA[ICD9: 784.0]

Diagnosis: ANXIETY STATE NOS[ICD9: 300.00]

Diagnosis: FLU VACCINE[ICD9: V04.81] Akanksha Xiangndangela KEVINQUELINE [facility]                       CPT-4: 22206              2013

 

                                        (86806) OFFICE/OUTPATIENT VISIT EST

Diagnosis: DIZZINESS/VERTIGO[ICD9: 780.4]

Diagnosis: SINUSITIS, ACUTE[ICD9: 461.9]

Diagnosis: Benign positional vertigo[ICD9: 386.11] Akanksha Xiangrodo KEVIN

FELECIAMARCY

[facility]         CPT-4: 51843              2013

 

                                        OFFICE/OUTPATIENT VISIT EST

Diagnosis: OTITIS MEDIA NOS[ICD9: 382.9] Akankshazina BAUMAN SAramis

 

[facility]            CPT-4: 39690              2013

 

                                        OFFICE/OUTPATIENT VISIT EST

Diagnosis: Perforation of ear drum[ICD9: 384.20]

Diagnosis: SINUSITIS, ACUTE[ICD9: 461.9] Akanksha Xiangndangela HIGHTOWERLINE [facility]            CPT-4: 85767              05/10/2013

 

                                        (89479) OFFICE/OUTPATIENT VISIT EST

Diagnosis: Mastalgia[ICD9: 611.71] Akanksha Xiangrodo KEVINQUELINE SAramis TJ

[facility]                       CPT-4: 13101              2013

 

                                        (96022) OFFICE/OUTPATIENT VISIT EST

Diagnosis: HYPERLIPIDEMIA NEC/NOS[ICD9: 272.4]

Diagnosis: HYPERTENSION[ICD9: 401.9]

Diagnosis: DIZZINESS/VERTIGO[ICD9: 780.4]

Diagnosis: Hyperglycemia[ICD9: 790.29]

Diagnosis: MALAISE AND FATIGUE[ICD9: 780.79] Akanksha TEIXEIRA SAramis 

[facility]            CPT-4: 68912              2012

 

                                        (35866) OFFICE/OUTPATIENT VISIT EST

Diagnosis: EUSTACHIAN TUBE DYSFUNCTION[ICD9: 381.81]

Diagnosis: DIZZINESS/VERTIGO[ICD9: 780.4]

Diagnosis: ABDOMINAL PAIN[ICD9: 789.00]

Diagnosis: GERD[ICD9: 530.81]

Diagnosis: CERUMEN IMPACTION[ICD9: 380.4] [facility]anksha [facility]            CPT-4: 02370              2012

 

                                        OFFICE/OUTPATIENT VISIT EST

Diagnosis: ABDOMINAL PAIN[ICD9: 789.00]

Diagnosis: DYSPEPSIA[ICD9: 536.8] Akanksha LANCE [facility]                       CPT-4: 19028              10/23/2012

 

                                        (36371) OFFICE/OUTPATIENT VISIT EST

Diagnosis: ABDOMINAL PAIN[ICD9: 789.00]

Diagnosis: DYSPEPSIA[ICD9: 536.8]

Diagnosis: IBS[ICD9: 564.1] Akanksha DRIVER [facility] CPT

-

4: 29523                                10/10/2012

 

                                        OFFICE/OUTPATIENT VISIT EST

Diagnosis: DIZZINESS/VERTIGO[ICD9: 780.4]

Diagnosis: HYPOGLYCEMIA[ICD9: 251.2] [facility]                       CPT-4: 93965              2012

 

                                        (47065) OFFICE/OUTPATIENT VISIT EST

Diagnosis: URINARY TRACT INFECTION[ICD9: 599.0] [facility]            CPT-4: 36796              2012

 

                                        (32248) OFFICE/OUTPATIENT VISIT EST

Diagnosis: HYPERLIPIDEMIA NEC/NOS[ICD9: 272.4]

Diagnosis: HYPERTENSION[ICD9: 401.9]

Diagnosis: MALAISE AND FATIGUE[ICD9: 780.79]

Diagnosis: DIZZINESS/VERTIGO[ICD9: 780.4] [facility]anksha [facility]            CPT-4: 39201              2012

 

                                        (90119) OFFICE/OUTPATIENT VISIT EST

Diagnosis: DIZZINESS/VERTIGO[ICD9: 780.4]

Diagnosis: MALAISE AND FATIGUE[ICD9: 780.79]

Diagnosis: HYPERTENSION[ICD9: 401.9] Akanksha MEJIA [facility]                       CPT-4: 59386              2012

 

                                        OFFICE/OUTPATIENT VISIT EST

Diagnosis: LUMB/LUMBOSAC DISC DEGEN[ICD9: 722.52]

Diagnosis: Radiculopathy of leg[ICD9: 724.4]

Diagnosis: SACROILIITIS NEC[ICD9: 720.2]

Diagnosis: SPINAL ENTHESOPATHY[ICD9: 720.1] Akanksha DRIVER [facility]            CPT-4: 83053              2012

 

                                        (14812) OFFICE/OUTPATIENT VISIT EST

Diagnosis: PAIN, LOWER BACK[ICD9: 724.2]

Diagnosis: SPASM OF MUSCLE[ICD9: 728.85]

Diagnosis: SCIATICA[ICD9: 724.3]

Diagnosis: Lumbar degenerative disc disease[ICD9: 722.52] Akanksha DRIVER DO [facility] CPT-4: 98434              2012

 

                                        (74839) OFFICE/OUTPATIENT VISIT EST

Diagnosis: PAIN IN THORACIC SPINE[ICD9: 724.1]

Diagnosis: SPASM OF MUSCLE[ICD9: 728.85] Akanksha DRIVER [facility]            CPT-4: 63248              2012

 

                                        (51315) OFFICE/OUTPATIENT VISIT EST

Diagnosis: PAIN, LOWER BACK[ICD9: 724.2]

Diagnosis: SPASM OF MUSCLE[ICD9: 728.85]

Diagnosis: Sacroiliac dysfunction[ICD9: 739.4]

Diagnosis: Lumbar degenerative disc disease[ICD9: 722.52] Akanksha DRIVER [facility] CPT-4: 58592              2012

 

                                        OFFICE/OUTPATIENT VISIT EST

Diagnosis: MALAISE AND FATIGUE[ICD9: 780.79]

Diagnosis: HYPOTENSION[ICD9: 458.9]

Diagnosis: CAD[ICD9: 414.00] Akanksha DRIVER [facility] 

CPT-4: 31787                            2012

 

                                        OFFICE/OUTPATIENT VISIT EST

Diagnosis: SINUSITIS, ACUTE[ICD9: 461.9]

Diagnosis: PHARYNGITIS, ACUTE[ICD9: 462]

Diagnosis: CERUMEN IMPACTION[ICD9: 380.4] Akanksha DRIVER [facility]            CPT-4: 19524              2011

 

                                        OFFICE/OUTPATIENT VISIT EST

Diagnosis: PAIN IN THORACIC SPINE[ICD9: 724.1]

Diagnosis: GERD[ICD9: 530.81]

Diagnosis: DIZZINESS/VERTIGO[ICD9: 780.4] Akanksha BAUMAN S

. 

[facility]            CPT-4: 02392              2011

 

                OFFICE/OUTPATIENT VISIT EST Akanksha BAKER ORE

[facility] CPT-

4: 53594                                2011

 

                    (04692) OFFICE/OUTPATIENT VISIT EST Akanksha CASTILLO SAramis [facility]                       CPT-4: 15283              2011

 

                                        (31645) OFFICE/OUTPATIENT VISIT, EST

Diagnosis: PAIN, LOWER BACK[ICD9: 724.2] Akanksha BAUMAN S.

 

[facility]            CPT-4: 35747              2011

 

                    (91437) OFFICE/OUTPATIENT VISIT, EST Akanksha DE LA ROSA S. [facility]

[facility]                       CPT-4: 90417              2011

 

                    (41802) OFFICE/OUTPATIENT VISIT, EST Akanksha DE LA ROSA S. [facility]

[facility]                       CPT-4: 56907              2010

 

                    (67654) OFFICE/OUTPATIENT VISIT, EST Akanksha IZQUIERDOLINE S. [facility]

[facility]                       CPT-4: 76053              2010

 

                    (91959) OFFICE/OUTPATIENT VISIT, EST Akanksha DE LA ROSA S. [facility]

[facility]                       CPT-4: 22685              2010

 

                    (58378) OFFICE/OUTPATIENT VISIT, EST Akanksha DE LA ROSA S. [facility]

[facility]                       CPT-4: 10556              2010

 

                    (79987) OFFICE/OUTPATIENT VISIT, EST Akanksha IZQUIERDOLINE S. [facility]

[facility]                       CPT-4: 90324              2010

 

                    (01759) OFFICE/OUTPATIENT VISIT, EST Akanksha KEVIN

QUELINE S. [facility]

[facility]                       CPT-4: 14368              2010







Plan of Care





             Planned Activity Notes        Codes        Status       Date

 

                          Visit Diagnosis Plan: Generalized anxiety disorder Dis

cussion: Stable

                                        ICD-9 : 300.00

ICD-10 : F41.1

                                                    2020

 

                          Visit Diagnosis Plan: Essential (primary) hypertension

 Discussion: Stable

Follow Up: 1 months

                                        ICD-9 : 401.9

ICD-10 : I10

                                                    2020

 

                                        Appointment: Akanksha Drivertel:[phone]

                                        [address] [place] [address]

[place]                                              FOLLOW UP       2020

 

                          Patient Education: metoprolol tartrate- [employer] [facility] [id number] 

[email or web address]

                                        Completed           2020

 

                          Visit Diagnosis Plan: Essential (primary) hypertension

 Discussion: Improving 

with higher dose of metoprolol Recheck 2weeks

                                        ICD-9 : 401.9

ICD-10 : I10

                                                    2020

 

                          Visit Diagnosis Plan: Palpitations Discussion: Continu

e higher dose of 

metoprolol

                                        ICD-9 : 785.1

ICD-10 : R00.2

                                                    2020

 

                                        Appointment: Akanksha Driver

WPtel:[phone]

                                        [address] [place] [address]

[place]                                              FOLLOW UP       2020

 

                                        Appointment: Akanksha Driver

WPtel:[phone]

                                        [address] [place] [address]              2020--moved up to  20 at 4pm (km)                 [place]

NCELED        2020

 

                          Visit Diagnosis Plan: Palpitations Discussion: Check C

BC, CMP, TSH

                                        ICD-9 : 785.1

ICD-10 : R00.2

                                                    2020

 

                          Visit Diagnosis Plan: Essential hypertension Discussio

n: Increase metoprolol to 

25mg q AM and 50mg po q pM Recheck 1 week

                                        ICD-9 : 401.9

ICD-10 : I10

                                                    2020

 

                                        Appointment: Akanksha Driver

WPtel:[phone]

                                        [address] [place] [address]

[place]                                              BP CHECK        2020

 

                                        Appointment: Akanksha Driver

WPtel:[phone]

                                        [address] [place] [address]                                              ACUTE ILLNESS   2020

 

                          Patient Education: metoprolol tartrate- [employer] [facility] [id number] 

https://[email or web address]urint/samplemd/resources/getResource/61/[email or web address]

                                        Completed           2020

 

                    Care Plan: X-RAY EXAM NECK SPINE 4/5VWS                     

LOINC : 31874-5

                          Pending                   2020

 

                    Care Plan: X-RAY EXAM THORAC SPINE4/>VW                     

LOINC : 97787-4

                          Pending                   2020

 

                                        Appointment: Akanksha Driver

WPtel:[phone]

                                        [address] [place] [address]                                              LAB             2019

 

                                        Appointment: Akanksha Driver

WPtel:[phone]

                                        [address] [place] [address]                                              INJECTION       10/22/2019

 

             Patient Education: INFLUENZA VACCINE [facility]                           

Completed    10/22/2019

 

                                        Appointment: Akanksha Driver

WPtel:[phone]

                                        [address] [place] [address]                                              LAB             10/11/2019

 

                          Visit Diagnosis Plan: Acute serous otitis media, left 

ear Discussion: instructed

to use flonase and claritin daily for symptom relief. discussed with patient 
that if no improvement in 2 weeks, will need to follow up with ENT for audiology
exam. instructed to call office with any other symptoms such as otalgia, 
dysphagia, fever, etc.

                                        ICD-9 : 381.01

ICD-10 : H65.02

                                                    2019

 

                                        Appointment: Nat Lozoya

                                        [address] [place] [address]                                              ACUTE ILLNESS   2019

 

                          Visit Diagnosis Plan: Urinary tract infection, site no

t specified Discussion: 

Started an old abx prescription

                                        ICD-9 : 599.0

ICD-10 : N39.0

                                                    06/10/2019

 

                          Visit Diagnosis Plan: Hypoglycemia, unspecified Discus

madeleine: Monitor BS At least 

6 small meals a day each with protein

                                        ICD-9 : 251.2

ICD-10 : E16.2

                                                    06/10/2019

 

                          Visit Diagnosis Plan: Essential (primary) hypertension

 Discussion: Stable Check 

CMP

                                        ICD-9 : 401.9

ICD-10 : I10

                                                    06/10/2019

 

                          Visit Diagnosis Plan: Other fatigue Discussion: Check 

CBC, TSH

                                        ICD-9 : 780.79

ICD-10 : R53.83

                                                    06/10/2019

 

                                        Appointment: Akanksha Driver

WPtel:[phone]

                                        [address] [place], [place] [id number]

[place]                                              FOLLOW UP       06/10/2019

 

                          Visit Diagnosis Plan: Essential (primary) hypertension

 Discussion: Stable Sees 

Dr. Clements this month

                                        ICD-9 : 401.9

ICD-10 : I10

                                                    2019

 

                          Visit Diagnosis Plan: Urinary tract infection, site no

t specified Discussion: 

Macrobid 100mg po BID for 1 week

                                        ICD-9 : 599.0

ICD-10 : N39.0

                                                    2019

 

                          Visit Diagnosis Plan: Gastro-esophageal reflux disease

 without esophagitis 

Discussion: Stable on omeprazole

Follow Up: 3 months

                                        ICD-9 : 530.81

ICD-10 : K21.9

                                                    2019

 

                                        Appointment: Akanksha Driver

WPtel:[phone]

                                        [address] [place], [place] [id number]

[place]                                              FOLLOW UP       2019

 

                          Patient Education: metoprolol tartrate- [employer] [facility] [id number] 

https://www.[email or web address]/resources/getResource/61/[email or web address]-91ka-81

                                        Completed           2019

 

                                        Appointment: Akanksha Driver

WPtel:[phone]

                                        [address] [place], [place] [address]

[place]                                              CANCELED        02/15/2019

 

                          Visit Diagnosis Plan: Gastro-esophageal reflux disease

 without esophagitis 

Discussion: Continue protonix and carafate Proceed with updated EGD

                                        ICD-9 : 530.81

ICD-10 : K21.9

                                                    2018

 

                          Visit Diagnosis Plan: Epigastric pain Discussion: Che

k CT abdomen/pelvis due to

ongoing abdominal pain

                                        ICD-9 : 789.06

ICD-10 : R10.13

                                                    2018

 

                                        Appointment: Akanksha Driver

WPtel:[phone]

                                        [address] [place], [place] [address]

[place]                                              FOLLOW UP       2018

 

                    Care Plan: CT PELVIS W/O DYE                     LOINC : 361

08-9

                          Pending                   2018

 

                    Care Plan: CT ABDOMEN W/O DYE                     LOINC : 36

103-0

                          Pending                   2018

 

                    Care Plan: Referral Order                     SNOMED-CT : 30

[id number]

                          Pending                   2018

 

                                        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

art disease of native coronary artery 

without angina pectoris                 Discussion: S/P cardiac cath--doing medi

vicki management 

with imdur and metoprolol increased Has fwup with cardiology next week Discussed
cardiac rehab

                                        ICD-9 : 414.00

ICD-10 : I25.10

                                                    2018

 

                          Visit Diagnosis Plan: Generalized anxiety disorder Dis

cussion: Stable

                                        ICD-9 : 300.00

ICD-10 : F41.1

                                                    2018

 

                          Visit Diagnosis Plan: Gastro-esophageal reflux disease

 without esophagitis 

Discussion: Continue protonix at BID dosing and carafate BID

Follow Up: 2 months

                                        ICD-9 : 530.81

ICD-10 : K21.9

                                                    2018

 

                          Visit Diagnosis Plan: Essential (primary) hypertension

 Discussion: Stable

                                        ICD-9 : 401.9

ICD-10 : I10

                                                    2018

 

                                        Appointment: Akanksha Driver

WPtel:[phone]

                                        [address] [place], [place] [address]

                                              FOLLOW UP       2018

 

                          Visit Diagnosis Plan: Gastro-esophageal reflux disease

 without esophagitis 

Discussion: Continue protonix at BID dosing Continue carafate at q AC and HS 
dosing for 2 more weeks then decrease to BID dosing

Follow Up: 4 weeks

                                        ICD-9 : 530.81

ICD-10 : K21.9

                                                    10/02/2018

 

                          Visit Diagnosis Plan: Urinary tract infection, site no

t specified Discussion: 

Reculture urine

                                        ICD-9 : 599.0

ICD-10 : N39.0

                                                    10/02/2018

 

                          Visit Diagnosis Plan: Generalized anxiety disorder Dis

cussion: Increase xanax to

BID dosing especially with upcoming cardiac testing which is already making 
patient more anxious and worried

                                        ICD-9 : 300.00

ICD-10 : F41.1

                                                    10/02/2018

 

                          Visit Diagnosis Plan: Palpitations Discussion: Cardilo

logy going to schedule 

Lexiscan

                                        ICD-9 : 785.1

ICD-10 : R00.2

                                                    10/02/2018

 

                                        Appointment: Akanksha Driver

WPtel:[phone]

                                        [address] [place], [place] [id number]                                              FOLLOW UP       10/02/2018

 

             Patient Education: Patient Medication Summary                      

     Completed    10/02/2018

 

                                        Appointment: Akanksha Driver

WPtel:[phone]

                                        [address] [place], [place] [id number]

                                              LAB             2018

 

             Patient Education: Patient Medication Summary                      

     Completed    2018

 

                          Visit Diagnosis Plan: Epigastric pain Discussion: Cont

inue protonix and carafate

but make into a slurry Flu shot given Discussed EGD if symptoms persist

Follow Up: 2 weeks

                                        ICD-9 : 789.06

ICD-10 : R10.13

                                                    2018

 

                          Visit Diagnosis Plan: Generalized anxiety disorder Dis

cussion: Did discuss 

increased risk of benzodiazepines causing dementia but at this time will stay at
xanax 0.25mg po BID

                                        ICD-9 : 300.00

ICD-10 : F41.1

                                                    2018

 

                                        Appointment: Akanksha Driver

WPtel:[phone]

                                        [address] [place] [address]                                              FOLLOW UP       2018

 

             Patient Education: Patient Medication Summary                      

     Completed    2018

 

                          Visit Diagnosis Plan: Essential (primary) hypertension

 Discussion: Has 

hydralazine to use prn per cardiology Going to do 30 day holter monitor

                                        ICD-9 : 401.9

ICD-10 : I10

                                                    2018

 

                          Visit Diagnosis Plan: Hypoglycemia, unspecified Discus

madeleine: 6 small meals a 

day--each with protein Increase fluid intake

                                        ICD-9 : 251.2

ICD-10 : E16.2

                                                    2018

 

                          Visit Diagnosis Plan: Gastro-esophageal reflux disease

 without esophagitis 

Discussion: Change to protonix 40mg po BID

Follow Up: 1 months

                                        ICD-9 : 530.81

ICD-10 : K21.9

                                                    2018

 

                                        Appointment: Akanksha Driver

WPtel:[phone]

                                        [address] [place], [place] [id number]                                              ACUTE ILLNESS   2018

 

             Patient Education: Patient Medication Summary                      

     Completed    2018

 

                          Visit Diagnosis Plan: Dizziness and giddiness Discussi

on: blood work to be 

completed in office including cmp, cbc, troponin to rule out glucose 
intolerance, infection, dehydration. instructed patient that she needs to see 
her cardiologist sooner than oct and patient requested we contact dr. clements's 
office. will have front office make appt. patient has carotid doppler annually.

                                        ICD-9 : 780.4

ICD-10 : R42

                                                    2018

 

                                        Appointment: Nat Lozoya

                                        [address] [place], [place] [id number]                                              ACUTE ILLNESS   2018

 

             Patient Education: Patient Medication Summary                      

     Completed    2018

 

                          Visit Diagnosis Plan: Cervicalgia Discussion: Complete

 course of PT since 

symptoms improved Continue stretching exercises Notify if symptoms return or 
worsen

                                        ICD-9 : 723.1

ICD-10 : M54.2

                                                    2018

 

                                        Appointment: Akanksha Driver

WPtel:[phone]

                                        [address] [place], [place] [address]                                              FOLLOW UP       2018

 

             Patient Education: Patient Medication Summary                      

     Completed    2018

 

                          Visit Diagnosis Plan: Hypoglycemia, unspecified Discus

madeleine: Eat something with 

protein every 2 hrs

                                        ICD-9 : 251.2

ICD-10 : E16.2

                                                    2018

 

                          Visit Diagnosis Plan: Other spondylosis with radiculop

athy, cervical region 

Discussion: Check MRI of cervical spine

                                        ICD-9 : 721.0

ICD-10 : M47.22

                                                    2018

 

                          Visit Diagnosis Plan: Vertigo of central origin, bilat

eral Discussion: Discussed

PT for vestibular therapy

                                        ICD-9 : 386.2

ICD-10 : H81.43

                                                    2018

 

                                        Appointment: Akanksha Driver

WPtel:[phone]

                                        [address] [place], [place] [id number]

                                                              2018

 

             Patient Education: Patient Medication Summary                      

     Completed    2018

 

                    Care Plan: MRI NECK SPINE W/O DYE                     LOINC 

: 81923-8

                          Pending                   2018

 

                          Visit Diagnosis Plan: Otalgia, bilateral Discussion: k

enalog 40 mg given to 

patient im. instructed patient to monitor blood sugar at home due to risk of 
increased sugar. instructed patient to rtc on wednesday if no improvement and 
may require antibiotic.

                                        ICD-9 : 388.70

ICD-10 : H92.03

                                                    2018

 

                          Visit Diagnosis Plan: Benign paroxysmal vertigo, bilat

eral Discussion: patient 

has meclizine at home but states it makes her too tired. instructed patient to 
cut in half and take at night. if it doesn't cause too much drowsiness, 
instructed to take bid until feeling better. instructed to rtc wed if no 
improvement or worsening symptoms. instructed patient to increase fluid intake 
as well.

                                        ICD-9 : 386.11

ICD-10 : H81.13

                                                    2018

 

                                        Appointment: Nat Lozoya

                                        [address] [place] [address]                                              ACUTE ILLNESS   2018

 

             Patient Education: Patient Medication Summary                      

     Completed    2018

 

                          Visit Diagnosis Plan: Left lower quadrant pain Discuss

ion: Culture urine Notify 

if worsens

                                        ICD-9 : 789.04

ICD-10 : R10.32

                                                    2018

 

                          Visit Diagnosis Plan: Low back pain Discussion: Stretc

hes Topical aspercreme 

Tylenol 1 po TID

                                        ICD-9 : 724.2

ICD-10 : M54.5

                                                    2018

 

                          Visit Diagnosis Plan: Radiculopathy, lumbosacral regio

n Discussion: As above

                                        ICD-9 : 724.4

ICD-10 : M54.17

                                                    2018

 

                                        Appointment: Akanksha Driver

WPtel:[phone]

                                        [address] [place] [address]                                              ACUTE ILLNESS   2018

 

             Patient Education: Patient Medication Summary                      

     Completed    2018

 

                                        Appointment: Akanksha Driver

WPtel:[phone]

                                        [address] [place] [address]

[place]                                              INJECTION       10/06/2017

 

             Patient Education: Patient Medication Summary                      

     Completed    10/06/2017

 

                                        Appointment: Akanksha Driver

WPtel:[phone]

                                        [address] [place] [address]

[place]                                              LAB             2017

 

             Patient Education: Patient Medication Summary                      

     Completed    2017

 

                          Visit Diagnosis Plan: Pain in thoracic spine Discussio

n: PT has helped Continue 

stretches and walking [facility]

                                        ICD-9 : 724.1

ICD-10 : M54.6

                                                    2017

 

                          Visit Diagnosis Plan: Other intervertebral disc degene

ration, lumbar region 

Follow Up: 3 months

                                        ICD-9 : 722.52

ICD-10 : M51.36

                                                    2017

 

                                        Appointment: Akanksha Driver

WPtel:[phone]

                                        [address] [place], [place] [id number]

                                              FOLLOW UP       2017

 

             Patient Education: Patient Medication Summary                      

     Completed    2017

 

                          Visit Diagnosis Plan: Low back pain Discussion: Start 

PT for low back- Seems 

like abdominal pain is radiating from low back

Follow Up: 5 weeks

                                        ICD-9 : 724.2

ICD-10 : M54.5

                                                    2017

 

                          Visit Diagnosis Plan: Left lower quadrant pain Discuss

ion: Had CT scan of 

abdomen/pelvis in 2017 and normal colonoscopy in 2015

                                        ICD-9 : 789.04

ICD-10 : R10.32

                                                    2017

 

                                        Appointment: Akanksha Driver

WPtel:[phone]

                                        [address] [place] [address]                                              ACUTE ILLNESS   2017

 

             Patient Education: Patient Medication Summary                      

     Completed    2017

 

                                        Appointment: Akanksha Driver

WPtel:[phone]

                                        [address] [place] [address]

[place]                                              LAB             2017

 

             Patient Education: Patient Medication Summary                      

     Completed    2017

 

                          Visit Diagnosis Plan: Mixed hyperlipidemia Discussion:

 Check lipids

                                        ICD-9 : 272.4

ICD-10 : E78.2

                                                    2017

 

                          Visit Diagnosis Plan: Impaired fasting glucose Discuss

ion: Return in AM for CMP,

HbA1C Accuchecks daily Diet/Exercise discussed at length

                                        ICD-9 : 790.29

ICD-10 : R73.01

                                                    2017

 

                          Visit Diagnosis Plan: Other fatigue Discussion: Check 

CBC, TSH

                                        ICD-9 : 780.79

ICD-10 : R53.83

                                                    2017

 

                          Visit Diagnosis Plan: Mastodynia Discussion: Check Jace

ateral diagnostic 

mammogram with US

                                        ICD-9 : 611.71

ICD-10 : N64.4

                                                    2017

 

                                        Appointment: Akanksha Driver

WPtel:[phone]

                                        [address] [place], [place] [id number]

[place]              3/7 confirmed `sl                 ACUTE ILLNESS   2017

 

             Patient Education: Patient Medication Summary                      

     Completed    2017

 

                    Care Plan: MAMMOGRAM SCREENING                     LOINC : 2

6347-5

                          Pending                   2017

 

                          Visit Diagnosis Plan: Acute upper respiratory infectio

n, unspecified Discussion:

Exam is reassuring Likely viral illness Supportive care reviewed and encouraged 
Follow up PRN

                                        ICD-9 : 465.9

ICD-10 : J06.9

                                                    2017

 

                                        Appointment: Graham Luba 

                                        [address] [place] [address]                                              ACUTE ILLNESS   2017

 

             Patient Education: Patient Medication Summary                      

     Completed    2017

 

                          Visit Plan:               Supportive care. Rest, Fluid

s, Tylenol/Motrin prn fever or 

bodyaches. Notify if worsening symptoms.

                                                            2016

 

                                        Appointment: Akanksha Driver

WPtel:[phone]

                                        [address] [place] [address]                                              ACUTE ILLNESS   2016

 

             Patient Education: Patient Medication Summary                      

     Completed    2016

 

                                        Appointment: Akanksha Driver

WPtel:[phone]

                                        [address] [place] [address]                                              INJECTION       10/07/2016

 

             Patient Education: Patient Medication Summary                      

     Completed    10/07/2016

 

                                        Appointment: Akanksha Driver

WPtel:[phone]

                                        [address] [place] [address]                                              LAB             2016

 

             Patient Education: Patient Medication Summary                      

     Completed    2016

 

                          Visit Plan:               Add miralax daily Use daily 

probiotic and metamucil and push fluids

Notify if worsens/persists

                                                            2016

 

                                        Appointment: Akanksha Driver

WPtel:[phone]

                                        [address] [place] [address]              16 confirmed ~sl                 ACUTE ILLNESS   2016

 

             Patient Education: Patient Medication Summary                      

     Completed    2016

 

                          Visit Plan:               No NSAIDs Kidney function di

scussed Discussed hydration Monitor lab

every 4months so will check CMP, HbA1C next month

                                                            2016

 

                                        Appointment: Akanksha Driver

WPtel:[phone]

                                        [address] [place], [place] [address]              03/15 confirmed ~sl                 ACUTE ILLNESS   2016

 

             Patient Education: Patient Medication Summary                      

     Completed    2016

 

                          Visit Plan:               Vestibular exercises Refill 

flonase Strict low Na diet--discussed 

that needs to read labels Rx for walker with chair given due to muscle 
weakness/unsteadiness Has carotids and abdominal aorta and legs checked in 
January Check Chem 7

                                                            2015

 

                                        Appointment: Akanksha Driver

WPtel:[phone]

                                        [address] [place], [place] [id number]

              12/15/15 appt confirmed cn                 FOLLOW UP       

 

             Patient Education: Patient Medication Summary                      

     Completed    2015

 

             Patient Education: Vertigo                           Completed    1

2015

 

                                        Appointment: Akanksha Driver

WPtel:[phone]

                                        [address] [place], [place] [id number]                                              INJECTION       10/16/2015

 

             Patient Education: Patient Medication Summary                      

     Completed    10/16/2015

 

                                        Appointment: Akanksha Driver

WPtel:[phone]

                                        [address] [place], [place] [id number]                                              UA              09/15/2015

 

             Patient Education: Patient Medication Summary                      

     Completed    09/15/2015

 

                                        Referral: Landon De La Torre

WPtel:[phone]

#1 [facility] [address]              Referral                        Completed       2015

 

                                        Appointment: Akanksha Driver

WPtel:[phone]

                                        [address] [place], [place] [address]                                              LAB             09/10/2015

 

             Patient Education: Patient Medication Summary                      

     Completed    09/10/2015

 

                          Visit Plan:               Check CMP, CBC, TSH, Free T4

, B12, Lipids, HbA1C Discussed 6 small 

meals a day each with protein Would likely benefit from antidepressant Update 
colonoscopy

                                                            2015

 

                                        Appointment: Akanksha Driver

WPtel:[phone]

                                        [address] [place], [place] [id number]

              9/8/15 confirmed                 ACUTE ILLNESS   2015

 

             Patient Education: Patient Medication Summary                      

     Completed    2015

 

                          Visit Plan:               Cerumen flush with warm wate

r and peroxide - good results Resume 

Nasonex nasal spray daily Recommended Debrox earwax removal drops

                                                            2015

 

                                        Appointment: Bertha Mon

WPtel:[phone]

                                        [address] [place], [place] [address]                                              ACUTE ILLNESS   2015

 

             Patient Education: Patient Medication Summary                      

     Completed    2015

 

                          Visit Plan:               Continue aspirin daily Add m

eloxicam for 1week Elevate and Ice Call

on 2015

 

                                        Appointment: Akanksha Driver

WPtel:[phone]

                                        [address] [place], [place] [address]                                              ACUTE ILLNESS   2015

 

             Patient Education: Patient Medication Summary                      

     Completed    2015

 

                          Visit Plan:               Been using baclofen at [facility] and has helped some PT for next 

2-4weeks

                                                            2015

 

                                        Appointment: Akanksha Driver

WPtel:[phone]

                                        [address] [place] [address] Follow Up 2015

 

             Patient Education: Patient Medication Summary                      

     Completed    2015

 

                                        Appointment: Akanksha Driver

WPtel:[phone]

                                        [address] [place], [place] [address]                                              LAB             2015

 

                                        Appointment: Akanksha Driver

WPtel:[phone]

                                        [address] [place], [place] [address]              feeling better, weather -                 FOLLOW UP       

 

                                        Referral: Landon De La Torre

WPtel:[phone]

1 [facility]              Referral                        Appointment Requested 2015

 

                          Visit Plan:               Continue exercise and curren

t meds See surgery for removal of right

arm lesion

                                                            2015

 

                                        Appointment: Akanksha Driver

WPtel:[phone]

                                        [address] [place], [place] [address]

                                              FOLLOW UP       2015

 

             Patient Education: Patient Medication Summary                      

     Completed    2015

 

                                        Appointment: Akanksha Driver

WPtel:[phone]

                                        [address] [place], [place] [address]

[place]                                              INJECTION       10/17/2014

 

             Patient Education: Patient Medication Summary                      

     Completed    10/17/2014

 

                                        Appointment: Bertha Mon

WPtel:[phone]

                                        [address] [place] [address]                                              ACUTE ILLNESS   2014

 

             Patient Education: Patient Medication Summary                      

     Completed    2014

 

                          Visit Plan:               Discussed that likely lumbar

 etiology for leg weakness Will change 

amlodopine to low dose dyazide and see if helps legs and inner ear

                                                            2014

 

                                        Appointment: Akanksha Driver

WPtel:[phone]

                                        [address] [place] [address]                                              FOLLOW UP       2014

 

             Patient Education: Patient Medication Summary                      

     Completed    2014

 

                          Visit Plan:               Ceftin and continue claritin

/flonase Decrease amlodopine to 2.5mg q

HS until fwup with Card due to weakness

                                                            2014

 

                                        Appointment: Akanksha Driver

WPtel:[phone]

                                        [address] [place] [address]                                              ACUTE ILLNESS   2014

 

             Patient Education: Patient Medication Summary                      

     Completed    2014

 

                                        Appointment: Akanksha Driver

WPtel:[phone]

                                        [address] [place] [address]                                              LAB             2014

 

             Patient Education: Patient Medication Summary                      

     Completed    2014

 

                                        Appointment: Bertha Mon

WPtel:[phone]

                                        [address] [place] [address]                                              ACUTE ILLNESS   2014

 

             Patient Education: Patient Medication Summary                      

     Completed    2014

 

                          Visit Plan:               Supportive care. Rest, Fluid

s, Tylenol prn fever or bodyaches. 

Notify if worsening symptoms. Ceftin

                                                            10/15/2013

 

                                        Appointment: Bertha Mon

WPtel:[phone]

                                        [address] [place] [address]                                              ACUTE ILLNESS   10/15/2013

 

             Patient Education: Patient Medication Summary                      

     Completed    10/15/2013

 

                                        Appointment: Akanksha Driver

WPtel:[phone]

                                        [address] [place] [address]                                              BP CHECK        2013

 

             Patient Education: Patient Medication Summary                      

     Completed    2013

 

                                        Appointment: Akanksha Driver

WPtel:[phone]

                                        [address] [place], [place] [address]

                                              ER Follow UP    2013

 

             Patient Education: Patient Medication Summary                      

     Completed    2013

 

                          Visit Plan:               Restart flonase BID Use mecl

izine 25mg q HS Vestibular exercises 

Claritin 10mg q AM See ENT if doesn't resolve

                                                            2013

 

                                        Appointment: Akanksha Driver

WPtel:[phone]

                                        [address] [place], [place] [id number]

                                              ACUTE ILLNESS   2013

 

             Patient Education: Patient Medication Summary                      

     Completed    2013

 

                          Visit Plan:               Will continue to observe

                                                            2013

 

                                        Appointment: Akanksha Driver

WPtel:[phone]

                                        [address] [place], [place] [address]

                                              FOLLOW UP       2013

 

             Patient Education: Patient Medication Summary                      

     Completed    2013

 

                          Visit Plan:               ERx for Augmentin 875mg q12 

x 10 days and Floxin otic drops 5 gtts 

AD x7days Sample of Nasonex per pt request Discussed treatment (nasal saline, 
salt water gargles, keeping right ear clean and dry, for worsening go to UC on 
weekend, Tylenol/ibuprofen, etc.) RTC 2 weeks for ear recheck.

                                                            05/10/2013

 

                                        Appointment: Jill Jean 

WPtel:[phone]

                                        [address] [place], [place] [id number]                                              ACUTE ILLNESS   05/10/2013

 

             Patient Education: Patient Medication Summary                      

     Completed    05/10/2013

 

                          Visit Plan:               Decrease caffeine intake Marina

ck Bilateral Mammogram with US of left 

breast

                                                            2013

 

                                        Appointment: Akanksha Driver

WPtel:[phone]

                                        [address] [place], [place] [id number]

                                              ACUTE ILLNESS   2013

 

             Patient Education: Patient Medication Summary                      

     Completed    2013

 

                                        Appointment: Kate Hall

WPtel:[phone]

                                        [address] [place], [place] [id number]

              appt scheduled                  ACUTE ILLNESS   2013

 

                                        Appointment: Akanksha Driver

WPtel:[phone]

                                        [address] [place], [place] [id number]                                              LAB             2012

 

             Patient Education: Patient Medication Summary                      

     Completed    2012

 

                          Visit Plan:               Continue off carafate and se

e how does Continue probiotic Add 

Vestibular exercises and use meclizine prn Continue Nasonex Check fasting lab 
including CMP, Lipids, CBC, TSH, Free T4, HbA1C

                                                            2012

 

                                        Appointment: Akanksha Drivertel:[phone]

                                        [address] [place] [address]                                              FOLLOW UP       2012

 

             Patient Education: Patient Medication Summary                      

     Completed    2012

 

                          Visit Plan:               Continue carafate for 4more 

weeks at current dose then decrease to 

BID for 2wks then q HS

                                                            10/23/2012

 

                                        Appointment: Akanksha Driver

WPtel:[phone]

                                        [address] [place], [place] [address]                                              FOLLOW UP       10/23/2012

 

             Patient Education: Patient Medication Summary                      

     Completed    10/23/2012

 

                          Visit Plan:               Continue omeprazole Add Ellyn

fate 1gm po q AC Add daily probiotic

                                                            10/10/2012

 

                                        Appointment: Akanksha Driver

WPtel:[phone]

                                        [address] [place] [address]                                              ACUTE ILLNESS   10/10/2012

 

             Patient Education: Patient Medication Summary                      

     Completed    10/10/2012

 

                                        Appointment: Akanksha Drivertel:[phone]

                                        [address] [place], [place] [id number]                                              INJECTION       2012

 

             Patient Education: Patient Medication Summary                      

     Completed    2012

 

                          Visit Plan:               Diabetic Diet Accuchecks BID

 alternating times Hold onglyza and 

Januvia for now and continue diet and exercise and weight loss and moniter BS 
Discussed hypoglycemia and snack of peanut butter and crackers with juice or 
milk

                                                            2012

 

                                        Appointment: Akanksha Drivertel:[phone]

                                        [address] [place], [place] [address]                                              WORK IN         2012

 

             Patient Education: Patient Medication Summary                      

     Completed    2012

 

                                        Appointment: Akanksha Drivertel:[phone]

                                        [address] [place], [place] [address]

                                              UA              2012

 

             Patient Education: Patient Medication Summary                      

     Completed    2012

 

                                        Appointment: Akanksha Drivertel:[phone]

                                        [address] [place] [address]                                              LAB             2012

 

             Patient Education: Patient Medication Summary                      

     Completed    2012

 

                                        Appointment: Akanksha Driver

WPtel:[phone]

                                        [address] [place] [address]                                              ACUTE ILLNESS   2012

 

             Patient Education: Patient Medication Summary                      

     Completed    2012

 

                          Visit Plan:               Injection to Right SI joint 

as above Pt will call in 3 days on pain

Has PT starting in 2wks.

                                                            2012

 

                                        Appointment: Akanksha Driver

WPtel:[phone]

                                        [address] [place] [address]                                              ACUTE ILLNESS   2012

 

             Patient Education: Patient Medication Summary                      

     Completed    2012

 

                          Visit Plan:               OMT done Start PT May need u

pdated MRI if need to consider epidural

Prednisone

                                                            2012

 

                                        Appointment: Akanksha Driver

WPtel:[phone]

                                        [address] [place] [address]                                              OMT             2012

 

             Patient Education: Patient Medication Summary                      

     Completed    2012

 

                          Visit Plan:               Flexeril and Vimovo OMT done

 Daily stretches

                                                            2012

 

                                        Appointment: Akanksha Driver

WPtel:[phone]

                                        [address] [place] [address]                                              ACUTE ILLNESS   2012

 

             Patient Education: Patient Medication Summary                      

     Completed    2012

 

                          Visit Plan:               OMT done Daily stretches Vinod

st heat or biofreeze Right SI joint 

injection Call in 1week Vimovo BID

                                                            2012

 

                                        Appointment: Akanksha Driver

WPtel:[phone]

                                        [address] [place] [address]                                              ACUTE ILLNESS   2012

 

             Patient Education: Patient Medication Summary                      

     Completed    2012

 

                                        Appointment: Akanksha Driver

WPtel:[phone]

                                        [address] [place], [place] [id number]

[place]                                              LAB             2012

 

             Patient Education: Patient Medication Summary                      

     Completed    2012

 

                          Visit Plan:               Decrease amlodopine to 1.25m

g daily Schedule with Cardiology 

Fasting lab in AM

                                                            2012

 

                                        Appointment: Akanksha Drivertel:[phone]

                                        [address] [place], [place] [address]                                              ACUTE ILLNESS   2012

 

             Patient Education: Patient Medication Summary                      

     Completed    2012

 

                          Visit Plan:               Saline nasal flushes prn. Ty

lenol/Motrin prn headache. Notify if 

persists/symptoms worsening. Cerumen removal from ears as above

                                                            2011

 

                                        Appointment: Akanksha Drivertel:[phone]

                                        [address] [place] [address]                                              ACUTE ILLNESS   2011

 

             Patient Education: Patient Medication Summary                      

     Completed    2011

 

                                        Appointment: Akanksha Driver

WPtel:[phone]

                                        [address] [place] [address]

[place]                                              INJECTION       10/05/2011

 

             Patient Education: Patient Medication Summary                      

     Completed    10/05/2011

 

                          Visit Plan:               Continue vimovo for 1more we

ek Increase Omeprazole to BID for 1mo 

then resume QD if stomach improved Vestibular exercises with meclizine q HS for 
next week

                                                            2011

 

                                        Appointment: Akanksha Drivertel:[phone]

                                        [address] [place], [place] [address]                                              FOLLOW UP       2011

 

             Patient Education: Patient Medication Summary                      

     Completed    2011

 

                                        Appointment: Akanksha Driver

WPtel:[phone]

                                        [address] [place], [place] [id number]                                              ACUTE ILLNESS   2011

 

             Patient Education: Patient Medication Summary                      

     Completed    2011

 

                                        Appointment: Akanksha Drivertel:[phone]

                                        [address] [place], [place] [id number]

[place]                                              LAB             2011

 

             Patient Education: Patient Medication Summary                      

     Completed    2011

 

                          Visit Plan:               Saline nasal flushes prn. Ty

lenol/Motrin prn headache. Notify if 

persists/symptoms worsening. Add Veramyst Increase Omeprazole to 20mg po BID

                                                            2011

 

                                        Appointment: Akanksha Driver

WPtel:[phone]

                                        [address] [place] [address]                                              ACUTE ILLNESS   2011

 

             Patient Education: Patient Medication Summary                      

     Completed    2011

 

                                        Appointment: Akanksha Driver

WPtel:[phone]

                                        [address] [place] [address]                                              FOLLOW UP       2011

 

             Patient Education: Patient Medication Summary                      

     Completed    2011

 

                                        Appointment: Akanksha Driver

WPtel:[phone]

                                        [address] [place] [address]                                              ACUTE ILLNESS   2011

 

             Patient Education: Patient Medication Summary                      

     Completed    2011

 

                          Visit Plan:               Nasocourt sample given. Pt. 

will notify if symptoms are worse on 

Monday.

                                                            2010

 

                                        Appointment: Kate Hall

WPtel:[phone]

                                        [address] [place] [address]                                              ACUTE ILLNESS   2010

 

             Patient Education: Patient Medication Summary                      

     Completed    2010

 

                                        Appointment: Akanksha Driver

WPtel:[phone]

                                        [address] [place] [address]

[place]                                              INJECTION       10/06/2010

 

             Patient Education: Patient Medication Summary                      

     Completed    10/06/2010

 

                                        Appointment: Akanksha Drvier

WPtel:[phone]

                                        [address] [place] [address]                                              FOLLOW UP       2010

 

             Patient Education: Patient Medication Summary                      

     Completed    2010

 

             Patient Education: Lexapro                           Completed    0

2010

 

                          Visit Plan:               May proceed with hiatal mykel

ia repair per Card. so will contact Dr. Cash's office to proceed with surgery Trial of Lexapro 5mg QD plus use 
xanax prn

                                                            2010

 

                                        Appointment: Akanksha Driver

WPtel:[phone]

                                        [address] [place] [address]                                              FOLLOW UP       2010

 

             Patient Education: Patient Medication Summary                      

     Completed    2010

 

                          Visit Plan:               B12 given Cont oral B12 and 

iron Fwup 1mo for B12 Proceed with 

hiatal hernia repair once card clearance

                                                            2010

 

                                        Appointment: Akanksha Driver

WPtel:[phone]

                                        [address] [place] [address]

[place]                                              FOLLOW UP       2010

 

             Patient Education: Patient Medication Summary                      

     Completed    2010

 

                                        Appointment: Akanksha Driver

WPtel:[phone]

                                        [address] [place] [address]

[place]                                              FOLLOW UP       2010

 

             Patient Education: Patient Medication Summary                      

     Completed    2010

 

                                        Appointment: Akanksha Driver

WPtel:[phone]

                                        [address] [place] [address]

[place]                                              LAB             2010

 

             Patient Education: Patient Medication Summary                      

     Completed    2010

 

                          Visit Plan:               Check fasting lab in AM--CMP

,Lipids, CBC, Vit D, TSH,FreeT4, B12

                                                            2010

 

                                        Appointment: Akanksha Driver

WPtel:[phone]

                                        [address] [place] [address]                                              FOLLOW UP       2010

 

             Patient Education: Patient Medication Summary                      

     Completed    2010

 

                                        Referral: Landon De La Torre

WPtel:[phone]

[address] [place] [id number]

[place]              Referral                        Appointment Requested  

 

                                        Referral: Landon De La Torre

WPtel:[phone]

[address]              Referral                        Initiated        







Instructions





                                        Comment

 

                                        . Supportive care.  Rest, Fluids, Tyleno

l/Motrin prn fever or bodyaches.  Notify

if worsening symptoms.



 

                                        . Add miralax daily

Use daily probiotic and metamucil and push fluids

Notify if worsens/persists

 

                                        . No NSAIDs

Kidney function discussed

Discussed hydration 

Monitor lab every 4months so will check CMP, HbA1C next month

 

                                        . Vestibular exercises

Refill flonase

Strict low Na diet--discussed that needs to read labels

Rx for walker with chair given due to muscle weakness/unsteadiness

Has carotids and abdominal aorta and legs checked in January

Check Chem 7



 

                                        . Check CMP, CBC, TSH, Free T4, B12, Lip

ids, HbA1C

Discussed 6 small meals a day each with protein

Would likely benefit from antidepressant 

Update colonoscopy

 

                                        . Cerumen flush with warm water and tyler

xide - good results 

Resume Nasonex nasal spray daily

Recommended Debrox earwax removal drops 

 

                                        . Continue aspirin daily

Add meloxicam for 1week

Elevate and Ice

Call on Monday

 

                                        . Been using baclofen at bedtime and has

 helped some

PT for next 2-4weeks



 

                                        . Continue exercise and current meds

See surgery for removal of right arm lesion

 

                                        . Discussed that likely lumbar etiology 

for leg weakness

Will change amlodopine to low dose dyazide and see if helps legs and inner ear

 

                                        . Ceftin and continue claritin/flonase

Decrease amlodopine to 2.5mg q HS until fwup with Card due to weakness

 

                                        .  Supportive care.  Rest, Fluids, Tylen

ol prn fever or bodyaches.  Notify if 

worsening symptoms.

Ceftin

 

                                        . Restart flonase BID

Use meclizine 25mg q HS

Vestibular exercises

Claritin 10mg q AM

See ENT if doesn't resolve

 

                                        . Will continue to observe



 

                                        . ERx for Augmentin 875mg q12 x 10 days 

and Floxin otic drops 5 gtts AD x7days

Sample of Nasonex per pt request

Discussed treatment (nasal saline, salt water gargles, keeping right ear clean 
and dry, for worsening go to UC on weekend, Tylenol/ibuprofen, etc.)

RTC 2 weeks for ear recheck.

 

                                        . Decrease caffeine intake

Check Bilateral Mammogram with US of left breast

 

                                        Left ear flushed with warm water and per

oxide with ear syringe and then last 

removed with currette--peroxide drops instilled.  Tolerated well, no 
complications, TM intact.. Continue off carafate and see how does

Continue probiotic

Add Vestibular exercises and use meclizine prn

Continue Nasonex

Check fasting lab including CMP, Lipids, CBC, TSH, Free T4, HbA1C

 

                                        . Continue carafate for 4more weeks at c

urrent dose then decrease to BID for 

2wks then q HS

 

                                        . Continue omeprazole

Add Carafate 1gm po q AC

Add daily probiotic



 

                                        . Diabetic Diet

Accuchecks BID alternating times

Hold onglyza and Januvia for now and continue diet and exercise and weight loss 
and moniter BS

Discussed hypoglycemia and snack of peanut butter and crackers with juice or 
milk

 

                                        Informed Consent obtainded.  Right SI yasir

int cleansed with alcohol and betadine 

and injected with 3cc 1%l lidocaine with 40mg depomedrol and 40mg kenalog, 
tolerated well with no complications, neosporin and bandage applied. Injection 
to Right SI joint as above

Pt will call in 3 days on pain

Has PT starting in 2wks.

 

                                        . OMT done

Start PT

May need updated MRI if need to consider epidural

Prednisone

 

                                        . Flexeril and Vimovo

OMT done

Daily stretches

 

                                        Right SI joint cleansed with alchohol an

d betadine and injected with 3cc 1% 

lidocaine with 40mg depomedrol and 40mg kenalog, tolerated well with no 
complications, neosporin and bandage applied. OMT done

Daily stretches

Moist heat or biofreeze

Right SI joint injection

Call in 1week

Vimovo BID

 

                                        . Decrease amlodopine to 1.25mg daily

Schedule with Cardiology

Fasting lab in AM

 

                                        .  Saline nasal flushes prn. Tylenol/Mot

rin prn headache.  Notify if 

persists/symptoms worsening.

Cerumen removal from ears as above



 

                                        . Continue vimovo for 1more week

Increase Omeprazole to BID for 1mo then resume QD if stomach improved

Vestibular exercises with meclizine q HS for next week

 

                                        . Saline nasal flushes prn. Tylenol/Motr

in prn headache.  Notify if 

persists/symptoms worsening.

Add Veramyst

Increase Omeprazole to 20mg po BID

 

                                        . Nasocourt sample given.  Pt. will noti

fy if symptoms are worse on Monday. 

 

                                        . May proceed with hiatal hernia repair 

per Card. so will contact Dr. Cash's office to proceed with surgery



Trial of Lexapro 5mg QD plus use xanax prn

 

                                        . B12 given

Cont oral B12 and iron

Fwup 1mo for B12

Proceed with hiatal hernia repair once card clearance

 

                                        . Check fasting lab in AM--CMP,Lipids, C

BC, Vit D, TSH,FreeT4, B12







Medical Equipment

No Medical Equipment data



Health Concerns Section

Health Concerns data not found



Goals Section

Goals data not found



Interventions Section

Interventions data not found



Health Status Evaluations/Outcomes Section

Health Status Evaluations/Outcomes data not found



Advance Directives

No Advance Directive data

## 2020-02-19 NOTE — XMS REPORT
CCD document using C-CDA

                             Created on: 2020



Leilani Ramírez

External Reference #: [id number]

: 1940

Sex: Female



Demographics





                          Address                   [address] [place]  [address]

 

                          Home Phone                [phone]

 

                          Preferred Language        Unknown

 

                          Marital Status            

 

                          Taoism Affiliation     Unknown

 

                          Race                      White

 

                          Ethnic Group              Not  or 





Author





                          Author                    Leilani Driver D.O.

 

                          Organization              [facility]

 

                          Address                   [address] [place]  [address]



 

                          Phone                     [phone]







Care Team Providers





                    Care Team Member Name Role                Phone

 

                    Akanksha Driver D.O., PP                  Unavailable

 

                          CCM                       Unavailable







Summary Purpose

Interface Exchange



Insurance Providers





             Payer name   Policy type / Coverage type Covered party ID Effective

 Begin Date 

Effective End Date

 

             [place] [place] B [id number]  2018   

  Unknown

 

             [employer] Senior Supplemental Medicare Part B [id number]   [id number]    

 Unknown







Family history





Father



                          Diagnosis                 Age At Onset

 

                          Heart disease             Unknown







Mother



                          Diagnosis                 Age At Onset

 

                          Heart disease             Unknown

 

                          Cancer                    Unknown







Social History





                Social History Element Codes           Description     Effective

 Dates

 

                Tobacco history SNOMED CT: 600109511 Never smoker    2011

 

                Marital status  Unknown         Single          2010

 

                Number of children Unknown         9               2010







Allergies, Adverse Reactions, Alerts





           Substance  Reaction   Codes      Entered Date Inactivated Date Status

 

           _                     Unknown    2019 No Inactive Date Active

 

           * NO KNOWN FOOD ALLERGIES            Unknown    2010 No Inactiv

e Date Active

 

           _                     Unknown    2010 No Inactive Date Active

 

           QUINIDINE-QUININE ANALOGUES hives,     Unknown    2010 No Inact

diamante Date Active







Problems





                Condition       Codes           Effective Dates Condition Status

 

                          Essential hypertension    ICD-9: 401.9

ICD-10: I10               2014                Active

 

                          Palpitations              ICD-9: 785.1

ICD-10: R00.2             10/02/2018                Active

 

                          Stress reaction           ICD-9: 308.9

ICD-10: F43.0             2020                Active

 

                          Mixed hyperlipidemia      ICD-9: 272.4

ICD-10: E78.2             2019                Active

 

                          FLU VACCINE               ICD-9: V04.81

ICD-10: Z23               2012                Active

 

                          Acute serous otitis media, left ear ICD-9: 381.01

ICD-10: H65.02            2019                Active

 

                          Coronary atherosclerosis due to calcified coronary les

ion ICD-9: 414.00

ICD-10: I25.84            2018                Active

 

                          Hypoglycemia, unspecified ICD-9: 251.2

ICD-10: E16.2             2017                Active

 

                          Other fatigue             ICD-9: 780.79

ICD-10: R53.83            2017                Active

 

                          Urinary tract infection, site not specified ICD-9: 599

.0

ICD-10: N39.0             10/02/2018                Active

 

                          Gastro-esophageal reflux disease without esophagitis I

CD-9: 530.81

ICD-10: K21.9             2018                Active

 

                          Epigastric pain           ICD-9: 789.06

ICD-10: R10.13            2018                Active

 

                          Atherosclerotic heart disease of native coronary arter

y without angina pectoris 

ICD-9: 414.00

ICD-10: I25.10            2018                Active

 

                          Generalized anxiety disorder ICD-9: 300.00

ICD-10: F41.1             2018                Active

 

                          Dizziness and giddiness   ICD-9: 780.4

ICD-10: R42               2014                Active

 

                          Occlusion and stenosis of bilateral carotid arteries I

CD-9: 433.10

ICD-10: I65.23            2018                Active

 

                          Cervicalgia               ICD-9: 723.1

ICD-10: M54.2             2018                Active

 

                          Other spondylosis with radiculopathy, cervical region 

ICD-9: 721.0

ICD-10: M47.22            2018                Active

 

                          Cervicocranial syndrome   ICD-9: 723.2

ICD-10: M53.0             2018                Active

 

                          Vertigo of central origin, bilateral ICD-9: 386.2

ICD-10: H81.43            2018                Active

 

                          Benign paroxysmal vertigo, bilateral ICD-9: 386.11

ICD-10: H81.13            2018                Active

 

                          Otalgia, bilateral        ICD-9: 388.70

ICD-10: H92.03            2018                Active

 

                          Left lower quadrant pain  ICD-9: 789.04

ICD-10: R10.32            2017                Active

 

                          Low back pain             ICD-9: 724.2

ICD-10: M54.5             2017                Active

 

                          Radiculopathy, lumbosacral region ICD-9: 724.4

ICD-10: M54.17            2018                Active

 

                          Impaired fasting glucose  ICD-9: 790.29

ICD-10: R73.01            2012                Active

 

                          Other intervertebral disc degeneration, lumbar region 

ICD-9: 722.52

ICD-10: M51.36            2017                Active

 

                          Pain in thoracic spine    ICD-9: 724.1

ICD-10: M54.6             2017                Active

 

                          Mastodynia                ICD-9: 611.71

ICD-10: N64.4             2017                Active

 

                          Acute upper respiratory infection, unspecified ICD-9: 

465.9

ICD-10: J06.9             2017                Active

 

                          Acute mastoiditis without complications, right ear ICD

-9: 383.00

ICD-10: H70.001           2016                Active

 

                          Constipation, unspecified ICD-9: 564.00

ICD-10: K59.00            2016                Active

 

                          Chronic kidney disease, stage 1 ICD-9: 585.1

ICD-10: N18.1             03/15/2016                Active

 

                          History of falling        ICD-9: V15.88

ICD-10: Z91.81            12/15/2015                Active

 

                          Muscle weakness (generalized) ICD-9: 780.79

ICD-10: M62.81            2015                Active

 

                Acute renal failure ICD-9: 584.9    2015      Active

 

                POLYURIA        ICD-9: 788.42   2015      Active

 

                CAD             ICD-9: 414.00   09/10/2015      Active

 

                Hyperglycemia   ICD-9: 790.29   2012      Active

 

                HYPERLIPIDEMIA NEC/NOS ICD-9: 272.4    09/10/2015      Active

 

                ABDOMINAL PAIN  ICD-9: 789.00   10/10/2012      Active

 

                Grieving        ICD-9: 309.0    2015      Active

 

                HYPOGLYCEMIA    ICD-9: 251.2    2012      Active

 

                MALAISE AND FATIGUE ICD-9: 780.79   2015      Active

 

                CERUMEN IMPACTION ICD-9: 380.4    2015      Active

 

                Leg pain        ICD-9: 729.5    2015      Active

 

                SUPERFIC PHLEBITIS-LEG ICD-9: 451.0    2015      Active

 

                SPASM OF MUSCLE ICD-9: 728.85   2015      Active

 

                Thoracic back pain ICD-9: 724.1    2015      Active

 

                Benign positional vertigo ICD-9: 386.11   2015      Active

 

                Suspicious nevus ICD-9: 238.2    2015      Active

 

                EUSTACHIAN TUBE DYSFUNCTION ICD-9: 381.81   2014      Acti

ve

 

                SINUSITIS, ACUTE ICD-9: 461.9    2014      Active

 

                DIZZINESS/VERTIGO ICD-9: 780.4    2014      Active

 

                HYPERTENSION    ICD-9: 401.9    2014      Active

 

                URI, ACUTE      ICD-9: 465.9    10/15/2013      Active

 

                CEPHALGIA       ICD-9: 784.0    2013      Active

 

                OTITIS MEDIA NOS ICD-9: 382.9    2013      Active

 

                Perforation of ear drum ICD-9: 384.20   05/10/2013      Active

 

                Mastalgia       ICD-9: 611.71   2013      Active

 

                DYSPEPSIA       ICD-9: 536.8    10/10/2012      Active

 

                IBS             ICD-9: 564.1    10/10/2012      Active

 

                FLU VACCINE     ICD-9: V04.81   2012      Active

 

                Radiculopathy of leg ICD-9: 724.4    2012      Active

 

                SCIATICA        ICD-9: 724.3    2012      Active

 

                Lumbar degenerative disc disease ICD-9: 722.52   2012     

 Active

 

                Sacroiliac dysfunction ICD-9: 739.4    2012      Active

 

                Hypertension    Unknown         2012      Active

 

                PAIN, LOWER BACK ICD-9: 724.2    2011      Active

 

                SPINAL ENTHESOPATHY ICD-9: 720.1    2011      Active

 

                Hypotension     ICD-9: 458.9    2011      Active

 

                SACROILIITIS NEC ICD-9: 720.2    2011      Active

 

                PHARYNGITIS, ACUTE ICD-9: 462      2010      Active

 

                URINARY TRACT INFECTION ICD-9: 599.0    2010      Active

 

                Heat exhaustion ICD-9: 992.5    2010      Active

 

                Esophagitis     ICD-9: 530.10   2010      Active

 

                Gastritis       ICD-9: 535.50   2010      Active

 

                GERD            ICD-9: 530.81   2010      Active

 

                Hiatal hernia   ICD-9: 553.3    2010      Active

 

                B12 DEFIC ANEMIA NEC ICD-9: 281.1    2010      Active

 

                Anxiety         Unknown         2010      Active

 

                Heart disease   Unknown         2010      Active

 

                Hyperlipidemia  Unknown         2010      Active

 

                ANXIETY STATE NOS ICD-9: 300.00   2010      Active

 

                DEPRESSIVE DISORDER NEC ICD-9: 311      2010      Active







Medications





          Medication Codes     Instructions Start Date Stop Date Status    Fill 

Instructions

 

                    Flonase Allergy Relief 50 mcg/actuation nasal spray,suspensi

on RxNorm: 4062973     2

Spray Nasal every night at bedtime 2020      Inactive     

    

 

           simvastatin 40 mg tablet RxNorm: 656087 1 Tablet(s) PO QD 2019 

02/15/2020 

Active                                   

 

                omeprazole 40 mg capsule,delayed release RxNorm: 586119  1 Capsu

le(s) Oral QD 

2019          Active               

 

                Xanax 0.25 mg tablet RxNorm: 157963  TAKE 1 TABLET BY MOUTH TWIC

E DAILY 

10/29/2019          No Stop Date        Active               

 

                omeprazole 40 mg capsule,delayed release RxNorm: 919759  1 Capsu

le(s) PO QD 

10/07/2019          2019          Inactive             

 

             metoprolol tartrate 25 mg tablet RxNorm: 554205 1 Tablet(s) PO BID 

2019                Active                     

 

                omeprazole 40 mg capsule,delayed release RxNorm: 477572  1 Capsu

le(s) PO QD 

2019          10/06/2019          Inactive             

 

                    Flonase Allergy Relief 50 mcg/actuation nasal spray,suspensi

on RxNorm: 1361945     2

[place] [facility] 2019      Inactive         

 

           simvastatin 40 mg tablet RxNorm: 736870 1 Tablet(s) PO QD 2019 

Inactive                                 

 

           simvastatin 40 mg tablet RxNorm: 453187 1 Tablet(s) PO QD 2019 

Inactive                                 

 

                omeprazole 40 mg capsule,delayed release RxNorm: 923703  1 Capsu

le(s) PO QD 

2019          Inactive             

 

           simvastatin 40 mg tablet RxNorm: 237984 1 Tablet(s) PO QD 2019 

Inactive                                 

 

                omeprazole 40 mg capsule,delayed release RxNorm: 613663  1 Capsu

le(s) PO QD 

2019          Inactive             

 

             Bactrim  mg-160 mg tablet RxNorm: 006859 1 Tablet(s) PO BID 0

3/08/2019   

2019                Inactive                   

 

             Bactrim  mg-160 mg tablet RxNorm: 083831 1 Tablet(s) PO BID 0

3/08/2019   

2019                Inactive                   

 

             Macrobid 100 mg capsule RxNorm: 939810 1 Capsule(s) PO BID 20

19   2019

                          Inactive                   

 

             metoprolol tartrate 25 mg tablet RxNorm: 798423 1 Tablet(s) PO BID 

2019                Inactive                   

 

                Xanax 0.25 mg tablet RxNorm: 601694  TAKE 1 TABLET BY MOUTH TWIC

E DAILY 

2018          10/28/2019          Inactive             

 

           Xanax 0.25 mg tablet RxNorm: 214081 1 Tablet(s) PO BID 2018 

Inactive                                 

 

                    Protonix 40 mg tablet,delayed release RxNorm: 953583      1 

Tablet(s) PO BID for 

stomach--replaces omeprazole 2018      Inactive         

 

             Carafate 1 gram tablet RxNorm: 229408 1 Tablet(s) PO AC & HS 2019                Inactive                   

 

           Xanax 0.25 mg tablet RxNorm: 940586 1 Tablet(s) PO BID 10/30/2018 12/

10/2018 

Inactive                                 

 

             Bactrim  mg-160 mg tablet RxNorm: 068215 1 Tablet(s) PO BID 1

   

10/08/2018                Inactive                   

 

             Bactrim  mg-160 mg tablet RxNorm: 791746 1 Tablet(s) PO BID 1

   

10/03/2018                Inactive                   

 

             Carafate 1 gram tablet RxNorm: 334612 1 Tablet(s) PO AC & HS 09/18/

2018   

10/17/2018                Inactive                   

 

                    Protonix 40 mg tablet,delayed release RxNorm: 559610      1 

Tablet(s) PO BID for 

stomach--replaces omeprazole 2018      Inactive         

 

                    Protonix 40 mg tablet,delayed release RxNorm: 464871      1 

Tablet(s) PO BID for 

stomach--replaces omeprazole 2018      Inactive         

 

                    fluticasone 50 mcg/actuation nasal spray,suspension RxNorm: 

4705208     2 Spray 

NASAL QD to each nostril 2018      Inactive         

 

             Nasonex 50 mcg/actuation Spray RxNorm: 8065346 2 [place] [facility] 2018                Inactive                   

 

                omeprazole 40 mg capsule,delayed release RxNorm: 654276  1 Capsu

le(s) PO QD 

2018          08/15/2018          Inactive             

 

                    simvastatin 40 mg tablet RxNorm: 299127      1 Tablet(s) PO 

QD TAKE 1 TABLET EVERY 

DAY             2018      Inactive         

 

             metoprolol tartrate 25 mg tablet RxNorm: 978886 1/2 Tablet(s) PO QD

 2018                Inactive                   

 

                simvastatin 40 mg tablet RxNorm: 259649  Tablet(s) TAKE 1 TABLET

 EVERY DAY 

2017          Inactive             

 

             metoprolol tartrate 25 mg tablet RxNorm: 712913 1/2 Tablet(s) PO QD

 2017                Inactive                   

 

                    Flonase 50 mcg/actuation nasal spray,suspension RxNorm: 1797

933     2 [place] [facility] 

BID             2017      Inactive         

 

                omeprazole 40 mg capsule,delayed release RxNorm: 235930  1 Capsu

le(s) PO QD 

2017          Inactive             

 

             metoprolol tartrate 25 mg tablet RxNorm: 328289 1/2 Tablet(s) PO QD

 04/10/2017   

2017                Inactive                   

 

                    ciprofloxacin 0.2 % ear drops in a dropperette RxNorm: 36571

6      4 Drop(s) OTIC TID

for 1 week      2016      Inactive         

 

           cefdinir 300 mg capsule RxNorm: 596721 2 Capsule(s) PO QD 2016 

Inactive                                 

 

                    omeprazole 40 mg capsule,delayed release RxNorm: 844990     

 TAKE 1 CAPSULE EVERY DAY

                2016      Inactive         

 

             simvastatin 40 mg tablet RxNorm: 605606 TAKE 1 TABLET EVERY DAY 2017                Inactive                   

 

                    Flonase 50 mcg/actuation nasal spray,suspension RxNorm: 1797

933     2 [place] [facility] 

BID             2015      Inactive         

 

             cefuroxime axetil 250 mg tablet RxNorm: 989927 1 Tablet(s) PO BID 0

2015                Inactive                   

 

             cefuroxime axetil 250 mg tablet RxNorm: 315617 1 Tablet(s) PO BID 0

2015                Inactive                   

 

                omeprazole 40 mg capsule,delayed release RxNorm: 334984  1 Capsu

le(s) PO QD 

2015          Inactive             

 

           meloxicam 7.5 mg tablet RxNorm: 436037 1 Tablet(s) PO QD 2015 0

2015 

Inactive                                 

 

                loratadine 10 mg tablet RxNorm: 694180  1 Tablet(s) PO QAM for a

llergies 

2014          Inactive             

 

                    Flonase 50 mcg/actuation nasal spray,suspension RxNorm: 8963

23      2 [place] [facility] BID

                2014      12/15/2015      Inactive         

 

           prednisone 20 mg tablet RxNorm: 168009 2 Tablet(s) PO BID 2014 

Inactive                                 

 

             Dyazide 37.5 mg-25 mg capsule RxNorm: 750549 1 Capsule(s) PO QAM 2014                Inactive                   

 

           Ceftin 500 mg tablet RxNorm: 198350 1 Tablet(s) PO BID 2014 

Inactive                                 

 

           Ceftin 500 mg tablet RxNorm: 216468 1 Tablet(s) PO BID 2014 

Inactive                                 

 

                    simvastatin 40 mg tablet RxNorm: 343444      Tablet(s) PO TA

KE ONE TABLET BY MOUTH 

EVERY DAY       2014      Inactive         

 

                    metoprolol tartrate 25 mg tablet RxNorm: 911556      Tablet(

s) PO TAKE ONE-HALF 

TABLET BY MOUTH EVERY DAY 2014      Inactive         

 

           Ceftin 500 mg tablet RxNorm: 893327 1 Tablet(s) PO BID 10/15/2013 10/

 

Inactive                                 

 

                loratadine 10 mg tablet RxNorm: 555920  1 Tablet(s) PO QAM for a

llergies 

2013          Inactive             

 

                    omeprazole 20 mg capsule,delayed release RxNorm: 751588     

 Capsule(s) PO TAKE ONE 

CAPSULE BY MOUTH TWICE DAILY 2013      Inactive         

 

                omeprazole 20 mg capsule,delayed release RxNorm: 048884  1 Capsu

le(s) PO BID 

2013          Inactive             

 

             Augmentin 875 mg-125 mg tablet RxNorm: 188156 1 Tablet(s) PO Q12H 0

5/10/2013   

2013                Inactive                   

 

             Floxin Otic Drops 1 bottle Drops RxNorm:      5 Drop(s) OTIC BID 05

/10/2013   

2013                Inactive                   

 

                    metoprolol tartrate 25 mg tablet RxNorm: 228983      Tablet(

s) PO TAKE ONE-HALF 

TABLET BY MOUTH EVERY DAY 2013      Inactive         

 

                    simvastatin 40 mg tablet RxNorm: 077445      Tablet(s) PO TA

KE ONE TABLET BY MOUTH 

EVERY DAY       2013      Inactive         

 

                lancets         RxNorm:         Misc Miscellaneous USE ONE TO CH

YOGI GLUCOSE EVERY DAY 

2013          Inactive             

 

             Carafate 1 gram tablet RxNorm: 319388 1 Tablet(s) PO AC & HS 2015                Inactive                   

 

           Flagyl 500 mg tablet RxNorm: 938285 1 Tablet(s) PO TID 10/10/2012 10/

 

Inactive                                 

 

             Carafate 1 gram tablet RxNorm: 763546 1 Tablet(s) PO AC & HS 10/10/

2012   

2012                Inactive                   

 

          One Touch Test strips RxNorm:   Miscellaneous QD 2012

 Inactive  

one touch ultra mini test strips

 

           Protonix 40 mg Tab RxNorm: 033538 1 Tablet(s) PO QD 2012 

Inactive                                 

 

           Protonix 40 mg Tab RxNorm: 432372 1 Tablet(s) PO QD 2012 10/09/

2012 

Inactive                                 

 

           prednisone 20 mg Tab RxNorm: 307089 1 Tablet(s) PO BID 2012 

Inactive                                 

 

             Flexeril 5 mg Tab RxNorm: 767407 1 Tablet(s) PO QHS for spasm 2012                Inactive                   

 

             Flexeril 5 mg Tab RxNorm: 980654 1 Tablet(s) PO QHS for spasm 2012                Inactive                   

 

                amlodipine 2.5 mg Tab RxNorm: 756465  1/2 Tablet(s) PO QD replac

es 5mg dose 

2012          Inactive             

 

           simvastatin 40 mg tablet RxNorm: 253157 1 Tablet(s) PO QD 2012 

Inactive                                 

 

             metoprolol tartrate 25 mg tablet RxNorm: 500460 1/2 Tablet(s) PO QD

 2012                Inactive                   

 

                omeprazole 20 mg capsule,delayed release RxNorm: 700146  1 Capsu

le(s) PO BID 

2012          Inactive             

 

           cefdinir 300 mg Cap RxNorm: 448196 2 Capsule(s) PO QD 2011 

Inactive                                 

 

           simvastatin 40 mg Tab RxNorm: 468279 1 Tablet(s) PO QD 2011 

Inactive                                 

 

             metoprolol tartrate 25 mg Tab RxNorm: 681860 1/2 Tablet(s) PO QD 10

/   

2012                Inactive                   

 

             metoprolol tartrate 25 mg Tab RxNorm: 453067 1/2 Tablet(s) PO QD 10

/   

10/24/2011                Inactive                   

 

           meclizine 25 mg Tab RxNorm: 4960797 1 Tablet(s) PO QHS 2011 

Inactive                                for dizziness

 

           Ceftin 500 mg Tab RxNorm: 059293 1 Tablet(s) PO BID 2011 

Inactive                                 

 

                omeprazole 20 mg Cap, Delayed Release RxNorm: 248961  1 Capsule(

s) PO BID 

2011          10/24/2011          Inactive             

 

           simvastatin 40 mg Tab RxNorm: 692279 1 Tablet(s) PO QD 2011 

Inactive                                 

 

           simvastatin 40 mg Tab RxNorm: 628315 1 Tablet(s) PO QD 2011 

Inactive                                 

 

           simvastatin 40 mg Tab RxNorm: 013282 1 Tablet(s) PO QD 2010 

Inactive                                 

 

             Tessalon Perles 100 mg Cap RxNorm: 975344 1 Capsule(s) PO Q6-8H 2010                Inactive                   

 

           cefdinir 300 mg Cap RxNorm: 126142 1 Capsule(s) PO BID 2010 

Inactive                                 

 

           Lexapro 10 mg Tab RxNorm: 682128 1 Tablet(s) PO QD 2010

010 

Inactive                                 

 

           Cipro 250 mg Tab RxNorm: 471617 1 Tablet(s) PO BID 2010 10/04/2

010 

Inactive                                 

 

             Wellbutrin  mg 24 hr Tab RxNorm: 136977 1 Tablet(s) PO QAM 2010                Inactive                   

 

          Fish Oil 1,000 mg Cap RxNorm:   1 Capsule(s) PO QD No Start Date      

     Active     

 

                Tylenol Extra Strength 500 mg tablet RxNorm: 475911  1/2 Tablet(

s) PO as needed 

No Start Date                           Active               

 

                nitroglycerin 0.4 mg sublingual tablet RxNorm: 509561  Tablet(s)

 SL as needed No 

Start Date                              Active               

 

                    Calcium with Vitamin D 600 mg (1,500 mg)-400 unit tablet RxN

orm: 296797      1 

Tablet(s) PO QD No Start Date                   Active           

 

           Multivitamin & Mineral Formula Tab RxNorm:    1 Tablet(s) PO QD No St

art Date            

Active                                   

 

          vitamin B complex capsule RxNorm:   1 Capsule(s) PO QD No Start Date  

         Active     

 

          Aspirin 81 mg Tab RxNorm: 251015 1 Tablet(s) PO QD No Start Date      

     Active     

 

                    isosorbide mononitrate ER 30 mg tablet,extended release 24 h

r RxNorm: 107249      1 

Tablet(s) PO QHS No Start Date                   Active           

 

           Vitamin D 1,000 unit Cap RxNorm: 395797 1 Capsule(s) PO QD No Start D

ate            

Active                                   

 

          Co Q-10 oral RxNorm: 19315 oral      No Start Date           Active   

  

 

             metoprolol tartrate 25 mg Tab RxNorm: 317756 1/2 Tablet(s) PO QD No

 Start Date 

10/23/2011                Inactive                   

 

             Nasonex 50 mcg/actuation Spray RxNorm: 8124252 2 Spray NASAL No Sta

rt Date 

2018                Inactive                   

 

           Vitamin B12 1000mcg Tablet RxNorm:    1 Tablet(s) PO QD No Start Date

 2015 

Inactive                                 

 

           amlodipine 5 mg Tab RxNorm: 394425 1 Tablet(s) PO QD No Start Date  

Inactive                                 

 

             simvastatin 40 mg tablet RxNorm: 892215 1 Tablet(s) PO QD No Start 

Date 

2019                Inactive                   

 

                omeprazole 20 mg capsule,delayed release RxNorm: 366809  1 Capsu

le(s) PO BID No 

Start Date          2013          Inactive             

 

                omeprazole 40 mg capsule,delayed release RxNorm: 261761  1 Capsu

le(s) PO QD No 

Start Date          2019          Inactive             

 

           Nexium 40 mg Cap RxNorm: 340085 1 Capsule(s) PO QD No Start Date  

Inactive                                 

 

             Stool Softener 100 mg Tab RxNorm: 9797286 2 Tablet(s) PO BID No Sta

rt Date 

2012                Inactive                   

 

                    Calcium with Vitamin D 600 mg (1,500 mg)-400 unit Tab RxNorm

: 775248      1 Tablet(s)

PO QD           No Start Date   2015      Inactive         

 

             iron 325 mg (65 mg iron) Tab RxNorm: 414846 1 Tablet(s) PO QD No St

art Date 

2015                Inactive                   

 

                Flonase 50 mcg/actuation Nasal Spray RxNorm: 253060  2 [place] ROZ

AL BID No Start 

Date                2014          Inactive             

 

                    fluticasone 50 mcg/actuation nasal spray,suspension RxNorm: 

8924816     2 Spray 

NASAL QD to each nostril No Start Date   2018      Inactive         

 

             Nasonex 50 mcg/actuation Spray RxNorm: 8839127 2 Spray NASAL QD No 

Start Date 

2018                Inactive                   

 

                    hydralazine 50 mg tablet RxNorm: 433425      1 Tablet(s) PO 

as needed for BP over 

160/90          No Start Date   2018      Inactive         

 

             Vimovo 500 mg-20 mg 12 hr Tab RxNorm: 576124 1 Tablet(s) PO BID No 

Start Date 

2011                Inactive                   

 

             Tylenol PM 25 mg-500 mg/15 mL Oral Soln RxNorm: 3941974 1 PO QPM   

  No Start Date 

2015                Inactive                   

 

          Iron (Ferrous Sulfate) Oral RxNorm:   Oral      No Start Date 20

12 Inactive   

 

             Reglan 10 mg Tab RxNorm: 856756 1 Tablet(s) PO TID before meals No 

Start Date 

2011                Inactive                   

 

           Xanax 1 mg Tab RxNorm: 842217 1/2 Tablet(s) PO QD No Start Date  

Inactive                                 

 

             amlodipine 10 mg tablet RxNorm: 111517 1 Tablet(s) PO QD No Start D

ate 

2014                Inactive                   

 

          Iron (dried) Oral RxNorm:   Oral      No Start Date 2012 Inactiv

e   

 

                metoprolol tartrate 50 mg tablet RxNorm: 798570  1 Tablet(s) PO 

BID No Start Date

                    12/10/2018          Inactive             

 

           Xanax 0.25 mg tablet RxNorm: 091369 1 Tablet(s) PO BID No Start Date 

10/29/2018 

Inactive                                 

 

                lancets         RxNorm:         Miscellaneous check blood sugar 

at least once daily No Start 

Date                2013          Inactive             

 

           simvastatin 40 mg Tab RxNorm: 993442 1 Tablet(s) PO QD No Start Date 

2010 

Inactive                                 

 

                    isosorbide mononitrate ER 30 mg tablet,extended release 24 h

r RxNorm: 038953      1 

Tablet(s) PO QHS No Start Date   2019      Inactive         

 

             amlodipine 2.5 mg tablet RxNorm: 475021 1 Tablet(s) PO QD No Start 

Date 

2014                Inactive                   

 

             sucralfate 1 gram tablet RxNorm: 178624 1 Tablet(s) PO QID No Start

 Date 

2019                Inactive                   

 

          Fish Oil Oral RxNorm:   Oral      No Start Date 2012 Inactive   

 

          Multiple Vitamin Oral RxNorm:   Oral      No Start Date 2012 [place]

ctive   

 

                metoprolol tartrate 25 mg tablet RxNorm: 105173  1/2 Tablet(s) P

O QD No Start 

Date                2017          Inactive             

 

                metoprolol tartrate 50 mg tablet RxNorm: 344143  1/2 Tablet(s) P

O BID No Start 

Date                2019          Inactive             

 

                    Flonase Allergy Relief 50 mcg/actuation nasal spray,suspensi

on RxNorm: 0006844     2

[place] NASAL QHS No Start Date   2019      Inactive         







Medication Administered

No Medication Administered data



Immunizations





                Vaccine         Codes           Date            Status

 

                Influenza       CVX: 135        10/22/2019      Complete

 

                Influenza       CVX: 135        10/22/2019      Complete

 

                Influenza       CVX: 135        2018      Complete

 

                Influenza       CVX: 135        10/06/2017      Complete

 

                Influenza       CVX: 135        10/07/2016      Complete

 

                Influenza       CVX: 135        10/16/2015       

 

                Influenza       CVX: 141        2013       

 

                Influenza (Adult) CVX: 141        10/06/2010       







Results





          Observation Observation Code Item      Item Code Result    Date      S

ervice Location

 

          GFR CALC  2328470   GFR Non Afr Amr           52 mL/min 10/11/2019 Unk

nown

 

          GFR CALC  0644687   GFR Afr Amr           >60 mL/min 10/11/2019 Unknow

n

 

          COMPREHENSIVE METABOLIC 99374     AST                 17 U/L    10/11/

2019 Unknown

 

          COMPREHENSIVE METABOLIC 33008     ALT                 11 U/L    10/11/

2019 Unknown

 

          COMPREHENSIVE METABOLIC 91884     BUN                 17 mg/dL  10/11/

2019 Unknown

 

          COMPREHENSIVE METABOLIC 38898     ALBUMIN             3.9 g/dL  10/11/

2019 Unknown

 

          COMPREHENSIVE METABOLIC 38142     CHLORIDE            108 mmol/L 10/11

/2019 Unknown

 

          COMPREHENSIVE METABOLIC 11926     Bili Total           0.5 mg/dL 10/11

/2019 Unknown

 

          COMPREHENSIVE METABOLIC 33499     ALK PHOS            72 U/L    10/11/

2019 Unknown

 

          COMPREHENSIVE METABOLIC 76358     SODIUM              142 mmol/L 10/11

/2019 Unknown

 

          COMPREHENSIVE METABOLIC 70563     CREATININE           1.02 mg/dL 10/1

2019 Unknown

 

          COMPREHENSIVE METABOLIC 34610     CALCIUM             9.3 mg/dL 10/11/

2019 Unknown

 

          COMPREHENSIVE METABOLIC 12148     POTASSIUM           4.0 mmol/L 10/11

/2019 Unknown

 

          COMPREHENSIVE METABOLIC 09881     Total Protein           6.2 g/dL  10

/2019 Unknown

 

          COMPREHENSIVE METABOLIC 07755     Glucose             93 mg/dL  10/11/

2019 Unknown

 

          COMPREHENSIVE METABOLIC 27297     Bicarbonate           27 mmol/L 10/1

2019 Unknown

 

          COMPREHENSIVE METABOLIC 04199     AGAP                7 mmol/L  10/11/

2019 Unknown

 

          GFR CALC  4154817   GFR Non Afr Amr           48 mL/min 06/10/2019 Unk

nown

 

          GFR CALC  6663552   GFR Afr Amr           59 mL/min 06/10/2019 Unknown

 

          THYROID STIMULATING HORMONE 50590     TSH                 1.936 uIU/mL

 06/10/2019 Unknown

 

          COMPREHENSIVE METABOLIC 14781     AST                 18 U/L    06/10/

2019 Unknown

 

          COMPREHENSIVE METABOLIC 73327     ALT                 11 U/L    06/10/

2019 Unknown

 

          COMPREHENSIVE METABOLIC 07350     BUN                 18 mg/dL  06/10/

2019 Unknown

 

          COMPREHENSIVE METABOLIC 66724     ALBUMIN             4.2 g/dL  06/10/

2019 Unknown

 

          COMPREHENSIVE METABOLIC 30455     CHLORIDE            109 mmol/L 06/10

/2019 Unknown

 

          COMPREHENSIVE METABOLIC 97166     Bili Total           0.4 mg/dL 06/10

/2019 Unknown

 

          COMPREHENSIVE METABOLIC 75218     ALK PHOS            64 U/L    06/10/

2019 Unknown

 

          COMPREHENSIVE METABOLIC 76474     SODIUM              142 mmol/L 06/10

/2019 Unknown

 

          COMPREHENSIVE METABOLIC 98450     CREATININE           1.09 mg/dL  Unknown

 

          COMPREHENSIVE METABOLIC 02429     CALCIUM             9.3 mg/dL 06/10/

2019 Unknown

 

          COMPREHENSIVE METABOLIC 16264     POTASSIUM           4.7 mmol/L 06/10

/2019 Unknown

 

          COMPREHENSIVE METABOLIC 51672     Total Protein           6.3 g/dL  06

/10/2019 Unknown

 

          COMPREHENSIVE METABOLIC 07006     Glucose             84 mg/dL  06/10/

2019 Unknown

 

          COMPREHENSIVE METABOLIC 93015     Bicarbonate           25 mmol/L  Unknown

 

          COMPREHENSIVE METABOLIC 98785     AGAP                8 mmol/L  06/10/

2019 Unknown

 

          COMPLETE BLOOD COUNT 0957738   WBC                 7.8 10e9/L 06/10/20

19 Unknown

 

          COMPLETE BLOOD COUNT [id number]   RBC                 4.04 10e12/L 06/10/

2019 Unknown

 

          COMPLETE BLOOD COUNT 0876073   HEMOGLOBIN           12.2 g/dL 06/10/20

19 Unknown

 

          COMPLETE BLOOD COUNT [id number]   HEMATOCRIT           38.6 %    06/10/20

19 Unknown

 

          COMPLETE BLOOD COUNT [id number]   MCV                 95.5 fL   06/10/201

9 Unknown

 

          COMPLETE BLOOD COUNT [id number]   MCH                 30.2 pg   06/10/201

9 Unknown

 

          COMPLETE BLOOD COUNT [id number]   MCHC                31.6 g/dL 06/10/201

9 Unknown

 

          COMPLETE BLOOD COUNT [id number]   PLATELET COUNT           215 10e9/L 06/

10/2019 Unknown

 

          COMPLETE BLOOD COUNT [id number]   Mean Plt Volume           11.2 fL   06/

10/2019 Unknown

 

          COMPLETE BLOOD COUNT [id number]   Neut Auto           45.7 %    06/10/201

9 Unknown

 

          COMPLETE BLOOD COUNT [id number]   Lymph Auto           42.1 %    06/10/20

19 Unknown

 

          COMPLETE BLOOD COUNT [id number]   Mono Auto           9.2 %     06/10/201

9 Unknown

 

          COMPLETE BLOOD COUNT [id number]   RDW                 13.4 %    06/10/201

9 Unknown

 

          COMPLETE BLOOD COUNT [id number]   Eos Auto            2.7 %     06/10/201

9 Unknown

 

          COMPLETE BLOOD COUNT [id number]   Baso Auto           0.3 %     06/10/201

9 Unknown

 

          COMPLETE BLOOD COUNT [id number]   Neutrophil Abs           3.56 10e9/L 06

/10/2019 Unknown

 

          COMPLETE BLOOD COUNT [id number]   Lymphocyte Abs           3.28 10e9/L 06

/10/2019 Unknown

 

          COMPLETE BLOOD COUNT [id number]   Monocyte Abs           0.72 10e9/L  Unknown

 

          COMPLETE BLOOD COUNT [id number]   Eosinophil Abs           0.21 10e9/L 06

/10/2019 Unknown

 

          COMPLETE BLOOD COUNT [id number]   RDW-SD              44.9 fL   06/10/201

9 Unknown

 

          COMPLETE BLOOD COUNT [id number]   Basophil Abs           0.02 10e9/L  Unknown

 

          COMPLETE BLOOD COUNT [id number]   WBC                 5.2 10e9/L 20

18 Unknown

 

          COMPLETE BLOOD COUNT [id number]   RBC                 4.21 10e12/L 2018 Unknown

 

          COMPLETE BLOOD COUNT [id number]   HEMOGLOBIN           12.8 g/dL 20

18 Unknown

 

          COMPLETE BLOOD COUNT [id number]   HEMATOCRIT           39.0 %    20

18 Unknown

 

          COMPLETE BLOOD COUNT [id number]   MCV                 92.6 fL   

8 Unknown

 

          COMPLETE BLOOD COUNT [id number]   MCH                 30.4 pg   

8 Unknown

 

          COMPLETE BLOOD COUNT [id number]   MCHC                32.8 g/dL 

8 Unknown

 

          COMPLETE BLOOD COUNT [id number]   PLATELET COUNT           202 10e9/L  Unknown

 

          COMPLETE BLOOD COUNT [id number]   Mean Plt Volume           10.7 fL    Unknown

 

          COMPLETE BLOOD COUNT [id number]   Neut Auto           40.9 %    

8 Unknown

 

          COMPLETE BLOOD COUNT [id number]   Lymph Auto           46.3 %    20

18 Unknown

 

          COMPLETE BLOOD COUNT [id number]   Mono Auto           9.3 %     

8 Unknown

 

          COMPLETE BLOOD COUNT [id number]   RDW                 13.7 %    

8 Unknown

 

          COMPLETE BLOOD COUNT [id number]   Eos Auto            2.9 %     

8 Unknown

 

          COMPLETE BLOOD COUNT [id number]   Baso Auto           0.6 %     

8 Unknown

 

          COMPLETE BLOOD COUNT [id number]   Neutrophil Abs           2.13 10e9/L  Unknown

 

          COMPLETE BLOOD COUNT [id number]   Lymphocyte Abs           2.41 10e9/L  Unknown

 

          COMPLETE BLOOD COUNT [id number]   Monocyte Abs           0.48 10e9/L  Unknown

 

          COMPLETE BLOOD COUNT [id number]   Eosinophil Abs           0.15 10e9/L  Unknown

 

          COMPLETE BLOOD COUNT [id number]   RDW-SD              45.2 fL   

8 Unknown

 

          COMPLETE BLOOD COUNT [id number]   Basophil Abs           0.03 10e9/L  Unknown

 

          [place] [address]     Glucose             118 mg/dL  Unknown

 

          [place] [address]     CREATININE           1.01 mg/dL  Unknown

 

          [place] [address]     BUN                 14 mg/dL   Unknown

 

          [place] [address]     SODIUM              141 mmol/L  Unknown

 

          [place] [address]     POTASSIUM           4.0 mmol/L  Unknown

 

          [place] [address]     CHLORIDE            108 mmol/L  Unknown

 

          [place] [address]     Bicarbonate           25 mmol/L  Unknown

 

          [place] [address]     AGAP                8 mmol/L   Unknown

 

          [place] [address]     CALCIUM             9.6 mg/dL  Unknown

 

          FREE T4   36025     T4 Free             1.23 ng/dL 2018 Unknown

 

          GFR CALC  6558970   GFR Non Afr Amr           53 mL/min 2018 Unk

nown

 

          GFR CALC  8737312   GFR Afr Amr           >60 mL/min 2018 Unknow

n

 

          THYROID STIMULATING HORMONE 65774     TSH                 2.124 uIU/mL

 2018 Unknown

 

          LIPID GROUP 84530     Cholesterol           152 mg/dL 2018 Unkno

wn

 

          LIPID GROUP 25715     Triglyceride           151 mg/dL 2018 Unkn

own

 

          LIPID GROUP 59220     HDL CHOLESTEROL           47 mg/dL  2018 U

nknown

 

          LIPID GROUP 22021     Chol/HDL Ratio           3.23 ratio 2018 U

nknown

 

          LIPID GROUP 17351     NON-HDL Chol           105 mg/dL 2018 Unkn

own

 

          LIPID GROUP 01800     LDL Cholesterol           75 mg/dL  2018 U

nknown

 

          ASSAY OF TROPONIN QUANT 24692     Troponin-I           <0.30 ng/mL  Unknown

 

          COMPREHENSIVE METABOLIC 58059     AST                 20 U/L    2018 Unknown

 

          COMPREHENSIVE METABOLIC 43674     ALT                 14 U/L    2018 Unknown

 

          COMPREHENSIVE METABOLIC 01169     BUN                 19 mg/dL  2018 Unknown

 

          COMPREHENSIVE METABOLIC 87447     ALBUMIN             4.2 g/dL  2018 Unknown

 

          COMPREHENSIVE METABOLIC 42689     CHLORIDE            102 mmol/L  Unknown

 

          COMPREHENSIVE METABOLIC 55600     Bili Total           0.4 mg/dL  Unknown

 

          COMPREHENSIVE METABOLIC 06627     ALK PHOS            66 U/L    2018 Unknown

 

          COMPREHENSIVE METABOLIC 01335     SODIUM              135 mmol/L  Unknown

 

          COMPREHENSIVE METABOLIC 19986     CREATININE           1.01 mg/dL  Unknown

 

          COMPREHENSIVE METABOLIC 28149     CALCIUM             9.3 mg/dL 2018 Unknown

 

          COMPREHENSIVE METABOLIC 01432     POTASSIUM           4.8 mmol/L  Unknown

 

          COMPREHENSIVE METABOLIC 34925     Total Protein           7.0 g/dL   Unknown

 

          COMPREHENSIVE METABOLIC 75849     Glucose             91 mg/dL  2018 Unknown

 

          COMPREHENSIVE METABOLIC 96117     Bicarbonate           23 mmol/L  Unknown

 

          COMPREHENSIVE METABOLIC 76598     AGAP                10 mmol/L 2018 Unknown

 

          COMPLETE BLOOD COUNT [id number]   WBC                 7.5 10e9/L 20

18 Unknown

 

          COMPLETE BLOOD COUNT [id number]   RBC                 4.13 10e12/L 2018 Unknown

 

          COMPLETE BLOOD COUNT [id number]   HEMOGLOBIN           12.6 g/dL 20

18 Unknown

 

          COMPLETE BLOOD COUNT [id number]   HEMATOCRIT           38.4 %    20

18 Unknown

 

          COMPLETE BLOOD COUNT [id number]   MCV                 93.0 fL   

8 Unknown

 

          COMPLETE BLOOD COUNT [id number]   MCH                 30.5 pg   

8 Unknown

 

          COMPLETE BLOOD COUNT [id number]   MCHC                32.8 g/dL 

8 Unknown

 

          COMPLETE BLOOD COUNT [id number]   PLATELET COUNT           204 10e9/L  Unknown

 

          COMPLETE BLOOD COUNT [id number]   Mean Plt Volume           10.9 fL    Unknown

 

          COMPLETE BLOOD COUNT [id number]   Neut Auto           43.1 %    

8 Unknown

 

          COMPLETE BLOOD COUNT [id number]   Lymph Auto           45.0 %    20

18 Unknown

 

          COMPLETE BLOOD COUNT [id number]   Mono Auto           9.2 %     

8 Unknown

 

          COMPLETE BLOOD COUNT [id number]   RDW                 13.4 %    

8 Unknown

 

          COMPLETE BLOOD COUNT [id number]   Eos Auto            2.3 %     

8 Unknown

 

          COMPLETE BLOOD COUNT [id number]   Baso Auto           0.4 %     

8 Unknown

 

          COMPLETE BLOOD COUNT [id number]   Neutrophil Abs           3.23 10e9/L  Unknown

 

          COMPLETE BLOOD COUNT [id number]   Lymphocyte Abs           3.38 10e9/L  Unknown

 

          COMPLETE BLOOD COUNT [id number]   Monocyte Abs           0.69 10e9/L  Unknown

 

          COMPLETE BLOOD COUNT [id number]   Eosinophil Abs           0.17 10e9/L  Unknown

 

          COMPLETE BLOOD COUNT [id number]   RDW-SD              44.4 fL   

8 Unknown

 

          COMPLETE BLOOD COUNT [id number]   Basophil Abs           0.03 10e9/L  Unknown

 

          GFR CALC  4514197   GFR Non Afr Amr           53 mL/min 2018 Unk

nown

 

          GFR CALC  3586335   GFR Afr Amr           >60 mL/min 2018 Unknow

n

 

          GLYCOSYLATED HEMOGLOBIN TEST 84919     Hgb A1c   03209-2   5.4 %     0

2018 Unknown

 

          MEAN GLUC 3233591   Calc Mean Gluc           108 mg/dL 2018 Unkn

own

 

          MEAN GLUC 9767019   Calc Mean Gluc           114 mg/dL 2017 Unkn

own

 

          LIPID GROUP 01346     Cholesterol           146 mg/dL 2017 Unkno

wn

 

          LIPID GROUP 73556     Triglyceride           119 mg/dL 2017 Unkn

own

 

          LIPID GROUP 93589     HDL CHOLESTEROL           47 mg/dL  2017 U

nknown

 

          LIPID GROUP 66559     Chol/HDL Ratio           3.11 ratio 2017 U

nknown

 

          LIPID GROUP 97674     NON-HDL Chol           99 mg/dL  2017 Unkn

own

 

          LIPID GROUP 76240     LDL Cholesterol           75 mg/dL  2017 U

nknown

 

          GLYCOSYLATED HEMOGLOBIN TEST 88086     Hgb A1c   93217-2   5.6 %     0

2017 Unknown

 

          COMPREHENSIVE METABOLIC 82405     AST                 22 U/L    2017 Unknown

 

          COMPREHENSIVE METABOLIC 23400     ALT                 12 U/L    2017 Unknown

 

          COMPREHENSIVE METABOLIC 23053     BUN                 17 mg/dL  2017 Unknown

 

          COMPREHENSIVE METABOLIC 50502     ALBUMIN             4.0 g/dL  2017 Unknown

 

          COMPREHENSIVE METABOLIC 58133     CHLORIDE            110 mmol/L  Unknown

 

          COMPREHENSIVE METABOLIC 63625     Bili Total           0.4 mg/dL  Unknown

 

          COMPREHENSIVE METABOLIC 31307     ALK PHOS            63 U/L    2017 Unknown

 

          COMPREHENSIVE METABOLIC 86596     SODIUM              140 mmol/L  Unknown

 

          COMPREHENSIVE METABOLIC 69928     CREATININE           1.05 mg/dL  Unknown

 

          COMPREHENSIVE METABOLIC 44332     CALCIUM             9.2 mg/dL 2017 Unknown

 

          COMPREHENSIVE METABOLIC 13001     POTASSIUM           4.2 mmol/L  Unknown

 

          COMPREHENSIVE METABOLIC 26984     Total Protein           6.2 g/dL   Unknown

 

          COMPREHENSIVE METABOLIC 16727     Glucose             87 mg/dL  2017 Unknown

 

          COMPREHENSIVE METABOLIC 20588     Bicarbonate           24 mmol/L  Unknown

 

          COMPREHENSIVE METABOLIC 22480     AGAP                6 mmol/L  2017 Unknown

 

          GFR CALC  5571645   GFR Non Afr Amr           51 mL/min 2017 Unk

nown

 

          GFR CALC  6105665   GFR Afr Amr           >60 mL/min 2017 Unknow

n

 

          COMPLETE BLOOD COUNT [id number]   WBC                 6.7 10e9/L 20

17 Unknown

 

          COMPLETE BLOOD COUNT [id number]   RBC                 4.04 10e12/L 2017 Unknown

 

          COMPLETE BLOOD COUNT [id number]   HEMOGLOBIN           12.1 g/dL 20

17 Unknown

 

          COMPLETE BLOOD COUNT [id number]   HEMATOCRIT           38.0 %    20

17 Unknown

 

          COMPLETE BLOOD COUNT [id number]   MCV                 94.1 fL   

7 Unknown

 

          COMPLETE BLOOD COUNT [id number]   MCH                 30.0 pg   

7 Unknown

 

          COMPLETE BLOOD COUNT [id number]   MCHC                31.8 g/dL 

7 Unknown

 

          COMPLETE BLOOD COUNT [id number]   PLATELET COUNT           206 10e9/L  Unknown

 

          COMPLETE BLOOD COUNT [id number]   Mean Plt Volume           11.3 fL    Unknown

 

          COMPLETE BLOOD COUNT [id number]   Neut Auto           35.8 %    

7 Unknown

 

          COMPLETE BLOOD COUNT [id number]   Lymph Auto           51.6 %    20

17 Unknown

 

          COMPLETE BLOOD COUNT [id number]   Mono Auto           8.8 %     

7 Unknown

 

          COMPLETE BLOOD COUNT [id number]   RDW                 13.5 %    

7 Unknown

 

          COMPLETE BLOOD COUNT [id number]   Eos Auto            3.4 %     

7 Unknown

 

          COMPLETE BLOOD COUNT [id number]   Baso Auto           0.4 %     

7 Unknown

 

          COMPLETE BLOOD COUNT [id number]   Neutrophil Abs           2.40 10e9/L  Unknown

 

          COMPLETE BLOOD COUNT [id number]   Lymphocyte Abs           3.46 10e9/L  Unknown

 

          COMPLETE BLOOD COUNT [id number]   Monocyte Abs           0.59 10e9/L  Unknown

 

          COMPLETE BLOOD COUNT [id number]   Eosinophil Abs           0.23 10e9/L  Unknown

 

          COMPLETE BLOOD COUNT [id number]   RDW-SD              45.3 fL   

7 Unknown

 

          COMPLETE BLOOD COUNT [id number]   Basophil Abs           0.03 10e9/L  Unknown

 

          THYROID STIMULATING HORMONE 40487     TSH                 1.981 uIU/mL

 2017 Unknown

 

          COMPLETE BLOOD COUNT [id number]   WBC                 6.0 10e9/L 20

17 Unknown

 

          COMPLETE BLOOD COUNT [id number]   RBC                 4.29 10e12/L 2017 Unknown

 

          COMPLETE BLOOD COUNT [id number]   HEMOGLOBIN           12.9 g/dL 20

17 Unknown

 

          COMPLETE BLOOD COUNT [id number]   HEMATOCRIT           38.4 %    20

17 Unknown

 

          COMPLETE BLOOD COUNT [id number]   MCV                 89.5 fL   

7 Unknown

 

          COMPLETE BLOOD COUNT [id number]   MCH                 30.1 pg   

7 Unknown

 

          COMPLETE BLOOD COUNT [id number]   MCHC                33.6 g/dL 

7 Unknown

 

          COMPLETE BLOOD COUNT [id number]   PLATELET COUNT           181 10e9/L 2017 Unknown

 

          COMPLETE BLOOD COUNT [id number]   Mean Plt Volume           11.7 fL   2017 Unknown

 

          COMPLETE BLOOD COUNT [id number]   Neut Auto           36.9 %    

7 Unknown

 

          COMPLETE BLOOD COUNT [id number]   Lymph Auto           50.4 %    20

17 Unknown

 

          COMPLETE BLOOD COUNT [id number]   Mono Auto           9.0 %     

7 Unknown

 

          COMPLETE BLOOD COUNT [id number]   RDW                 13.7 %    

7 Unknown

 

          COMPLETE BLOOD COUNT [id number]   Eos Auto            3.4 %     

7 Unknown

 

          COMPLETE BLOOD COUNT [id number]   Baso Auto           0.3 %     

7 Unknown

 

          COMPLETE BLOOD COUNT [id number]   Neutrophil Abs           2.21 10e9/L  Unknown

 

          COMPLETE BLOOD COUNT [id number]   Lymphocyte Abs           3.02 10e9/L  Unknown

 

          COMPLETE BLOOD COUNT [id number]   Monocyte Abs           0.54 10e9/L 2017 Unknown

 

          COMPLETE BLOOD COUNT [id number]   Eosinophil Abs           0.20 10e9/L  Unknown

 

          COMPLETE BLOOD COUNT [id number]   RDW-SD              44.0 fL   

7 Unknown

 

          COMPLETE BLOOD COUNT [id number]   Basophil Abs           0.02 10e9/L 2017 Unknown

 

          GLYCOSYLATED HEMOGLOBIN TEST 46358     Hgb A1c   45097-2   5.4 %     0

3/09/2017 Unknown

 

          THYROID STIMULATING HORMONE 44521     TSH                 2.200 uIU/mL

 2017 Unknown

 

          GFR CALC  0476952   GFR Non Afr Amr           50 mL/min 2017 Unk

nown

 

          GFR CALC  8963773   GFR Afr Amr           >60 mL/min 2017 Unknow

n

 

          MEAN GLUC [id number]   Calc Mean Gluc           108 mg/dL 2017 Unkn

own

 

          COMPREHENSIVE METABOLIC 67698     AST                 18 U/L    2017 Unknown

 

          COMPREHENSIVE METABOLIC 67867     ALT                 10 U/L    2017 Unknown

 

          COMPREHENSIVE METABOLIC 80602     BUN                 20 mg/dL  2017 Unknown

 

          COMPREHENSIVE METABOLIC 20790     ALBUMIN             4.1 g/dL  2017 Unknown

 

          COMPREHENSIVE METABOLIC 77989     CHLORIDE            109 mmol/L  Unknown

 

          COMPREHENSIVE METABOLIC 63573     Bili Total           0.6 mg/dL  Unknown

 

          COMPREHENSIVE METABOLIC 43400     ALK PHOS            64 U/L    2017 Unknown

 

          COMPREHENSIVE METABOLIC 38231     SODIUM              141 mmol/L  Unknown

 

          COMPREHENSIVE METABOLIC 21022     CREATININE           1.06 mg/dL 2017 Unknown

 

          COMPREHENSIVE METABOLIC 69760     CALCIUM             9.9 mg/dL 2017 Unknown

 

          COMPREHENSIVE METABOLIC 11910     POTASSIUM           4.2 mmol/L  Unknown

 

          COMPREHENSIVE METABOLIC 97604     Total Protein           6.3 g/dL   Unknown

 

          COMPREHENSIVE METABOLIC 35276     Glucose             99 mg/dL  2017 Unknown

 

          COMPREHENSIVE METABOLIC 34212     Bicarbonate           21 mmol/L 2017 Unknown

 

          COMPREHENSIVE METABOLIC 00198     AGAP                11 mmol/L 2017 Unknown

 

          LIPID GROUP [id number]     Cholesterol           169 mg/dL 2016 Unkno

wn

 

          LIPID GROUP [id number]     Triglyceride           165 mg/dL 2016 Unkn

own

 

          LIPID GROUP [id number]     HDL CHOLESTEROL           43 mg/dL  2016 U

nknown

 

          LIPID GROUP [id number]     Chol/HDL Ratio           3.93 ratio 2016 U

nknown

 

          LIPID GROUP [id number]     NON-HDL Chol           126 mg/dL 2016 Unkn

own

 

          LIPID GROUP [id number]     LDL Cholesterol           93 mg/dL  2016 U

nknown

 

          COMPREHENSIVE METABOLIC 45510     AST                 18 U/L    2016 Unknown

 

          COMPREHENSIVE METABOLIC 87210     ALT                 10 U/L    2016 Unknown

 

          COMPREHENSIVE METABOLIC 41910     BUN                 20 mg/dL  2016 Unknown

 

          COMPREHENSIVE METABOLIC 84424     ALBUMIN             3.9 g/dL  2016 Unknown

 

          COMPREHENSIVE METABOLIC 44380     CHLORIDE            110 mmol/L  Unknown

 

          COMPREHENSIVE METABOLIC 04765     Bili Total           0.5 mg/dL  Unknown

 

          COMPREHENSIVE METABOLIC 47331     ALK PHOS            72 U/L    2016 Unknown

 

          COMPREHENSIVE METABOLIC 30322     SODIUM              141 mmol/L  Unknown

 

          COMPREHENSIVE METABOLIC 94550     CREATININE           1.12 mg/dL  Unknown

 

          COMPREHENSIVE METABOLIC 08407     CALCIUM             9.7 mg/dL 2016 Unknown

 

          COMPREHENSIVE METABOLIC 95358     POTASSIUM           4.4 mmol/L  Unknown

 

          COMPREHENSIVE METABOLIC 08384     Total Protein           6.2 g/dL   Unknown

 

          COMPREHENSIVE METABOLIC 42949     Glucose             90 mg/dL  2016 Unknown

 

          COMPREHENSIVE METABOLIC 57640     Bicarbonate           23 mmol/L  Unknown

 

          COMPREHENSIVE METABOLIC 35526     AGAP                8 mmol/L  2016 Unknown

 

          GFR CALC  8666301   GFR Non Afr Amr           47 mL/min 2016 Unk

nown

 

          GFR CALC  3716391   GFR Afr Amr           57 mL/min 2016 Unknown

 

          GLYCOSYLATED HEMOGLOBIN TEST 32764     Hgb A1c   13809-1   5.5 %     0

2016 Unknown

 

          THYROID STIMULATING HORMONE 82022     TSH                 2.537 uIU/mL

 2016 Unknown

 

          FREE T4   31019     T4 Free             1.36 ng/dL 2016 Unknown

 

          COMPLETE BLOOD COUNT [id number]   WBC                 6.8 10e9/L 20

16 Unknown

 

          COMPLETE BLOOD COUNT [id number]   RBC                 4.20 10e12/L 2016 Unknown

 

          COMPLETE BLOOD COUNT 8666358   HEMOGLOBIN           12.5 g/dL 20

16 Unknown

 

          COMPLETE BLOOD COUNT 8254667   HEMATOCRIT           38.0 %    20

16 Unknown

 

          COMPLETE BLOOD COUNT 4533612   MCV                 90.5 fL   

6 Unknown

 

          COMPLETE BLOOD COUNT 2432652   MCH                 29.8 pg   

6 Unknown

 

          COMPLETE BLOOD COUNT 1478611   MCHC                32.9 g/dL 

6 Unknown

 

          COMPLETE BLOOD COUNT 0271279   PLATELET COUNT           197 10e9/L  Unknown

 

          COMPLETE BLOOD COUNT 8385068   Mean Plt Volume           11.7 fL    Unknown

 

          COMPLETE BLOOD COUNT [id number]   Neut Auto           41.3 %    

6 Unknown

 

          COMPLETE BLOOD COUNT [id number]   Lymph Auto           47.1 %    20

16 Unknown

 

          COMPLETE BLOOD COUNT 4058422   Mono Auto           7.8 %     

6 Unknown

 

          COMPLETE BLOOD COUNT 4046588   RDW                 13.8 %    

6 Unknown

 

          COMPLETE BLOOD COUNT 1129669   Eos Auto            3.4 %     

6 Unknown

 

          COMPLETE BLOOD COUNT 6581803   Baso Auto           0.4 %     

6 Unknown

 

          COMPLETE BLOOD COUNT 6390200   Neutrophil Abs           2.81 10e9/L  Unknown

 

          COMPLETE BLOOD COUNT 6961594   Lymphocyte Abs           3.20 10e9/L  Unknown

 

          COMPLETE BLOOD COUNT 7534473   Monocyte Abs           0.53 10e9/L  Unknown

 

          COMPLETE BLOOD COUNT [id number]   Eosinophil Abs           0.23 10e9/L  Unknown

 

          COMPLETE BLOOD COUNT [id number]   RDW-SD              44.4 fL   

6 Unknown

 

          COMPLETE BLOOD COUNT 8508369   Basophil Abs           0.03 10e9/L  Unknown

 

          MEAN GLUC 5623019   Calc Mean Gluc           111 mg/dL 2016 Unkn

own

 

          [place] [address]     Glucose             89 MG/DL   Unknown

 

          [place] [address]     CREATININE           1.12 MG/DL  Unknown

 

          [place] [address]     BUN                 20 MG/DL   Unknown

 

          [place] [address]     SODIUM              139 MMOL/L  Unknown

 

          [place] [address]     POTASSIUM           4.6 MMOL/L  Unknown

 

          [place] [address]     CHLORIDE            108 MMOL/L  Unknown

 

          [place] [address]     BICARB              26 MMOL/L  Unknown

 

          [place] [address]     ANION GAP           5 MEQ/L    Unknown

 

          [place] [address]     CALCIUM             10.0 MG/DL  Unknown

 

          GFR CALC  6651648   GFR AA              57.0L ML/MIN 2015 Unknow

n

 

          GFR CALC  5067833   GFR NON-AA           47.0L ML/MIN 2015 Unkno

wn

 

          THYROID STIMULATING HORMONE 46647     TSH                 2.378 uIU/ML

 09/10/2015 Unknown

 

          COMPLETE BLOOD COUNT [id number]   WBC                 6.4 10e9/L 09/10/20

15 Unknown

 

          COMPLETE BLOOD COUNT [id number]   RBC                 3.99 10e12/L 09/10/

2015 Unknown

 

          COMPLETE BLOOD COUNT [id number]   HGB                 11.9 g/dL 09/10/201

5 Unknown

 

          COMPLETE BLOOD COUNT 5157551   HCT DET             36.9 %    09/10/201

5 Unknown

 

          COMPLETE BLOOD COUNT [id number]   MCV                 92.5 fL   09/10/201

5 Unknown

 

          COMPLETE BLOOD COUNT [id number]   MCH                 29.8 pg   09/10/201

5 Unknown

 

          COMPLETE BLOOD COUNT [id number]   MCHC                32.2 g/dL 09/10/201

5 Unknown

 

          COMPLETE BLOOD COUNT [id number]   PLT                 172 10e9/L 09/10/20

15 Unknown

 

          COMPLETE BLOOD COUNT 5775514   MPV                 11.7 fL   09/10/201

5 Unknown

 

          COMPLETE BLOOD COUNT [id number]   ARIK %               40.4 %    09/10/201

5 Unknown

 

          COMPLETE BLOOD COUNT [id number]   LY %                48.0 %    09/10/201

5 Unknown

 

          COMPLETE BLOOD COUNT [id number]   MON %               8.3 %     09/10/201

5 Unknown

 

          COMPLETE BLOOD COUNT [id number]   EOS  %              2.8 %     09/10/201

5 Unknown

 

          COMPLETE BLOOD COUNT [id number]   BASO %              0.5 %     09/10/201

5 Unknown

 

          COMPLETE BLOOD COUNT [id number]   RDW                 13.6 %    09/10/201

5 Unknown

 

          COMPLETE BLOOD COUNT [id number]   ABS ARIK             2.59 10e9/L 09/10/2

015 Unknown

 

          COMPLETE BLOOD COUNT [id number]   ABS LYMPH           3.07 10e9/L 09/10/2

015 Unknown

 

          COMPLETE BLOOD COUNT [id number]   ABS MONO            0.53 10e9/L 09/10/2

015 Unknown

 

          COMPLETE BLOOD COUNT [id number]   ABS EOS             0.18 10e9/L 09/10/2

015 Unknown

 

          COMPLETE BLOOD COUNT [id number]   ABS BASO            0.03 10e9/L 09/10/2

015 Unknown

 

          COMPLETE BLOOD COUNT [id number]   RDW-SD              44.9 fL   09/10/201

5 Unknown

 

          LIPID GROUP [id number]     HDL TEST            42 MG/DL  09/10/2015 Unknown

 

          LIPID GROUP [id number]     TRIG                177 MG/DL 09/10/2015 Unknown

 

          LIPID GROUP [id number]     TEST LDL            72 MG/DL  09/10/2015 Unknown

 

          LIPID GROUP [id number]     CHOL                149 MG/DL 09/10/2015 Unknown

 

          LIPID GROUP [id number]     RCHOL/HDL           3.55 RATIO 09/10/2015 Unknow

n

 

          LIPID GROUP [id number]     NON-HDL CH           107 MG/DL 09/10/2015 Unknow

n

 

          GLYCOSYLATED HEMOGLOBIN TEST 57031     A1C HPLC  69771-7   5.5 %     0

9/10/2015 Unknown

 

          FREE T4   57389     FREE T4             1.39 NG/DL 09/10/2015 Unknown

 

          GFR CALC  0359306   GFR AA              55.0L ML/MIN 09/10/2015 Unknow

n

 

          GFR CALC  2202672   GFR NON-AA           46.0L ML/MIN 09/10/2015 Unkno

wn

 

          COMPREHENSIVE METABOLIC 33823     AST                 17 U/L    09/10/

2015 Unknown

 

          COMPREHENSIVE METABOLIC 64543     ALT                 10 IU/L   09/10/

2015 Unknown

 

          COMPREHENSIVE METABOLIC 77795     BUN                 20 MG/DL  09/10/

2015 Unknown

 

          COMPREHENSIVE METABOLIC 81059     ALBUMIN             3.9 GM/DL 09/10/

2015 Unknown

 

          COMPREHENSIVE METABOLIC 88255     CHLORIDE            111 MMOL/L 09/10

/2015 Unknown

 

          COMPREHENSIVE METABOLIC 36188     BILI TOT            0.4 MG/DL 09/10/

2015 Unknown

 

          COMPREHENSIVE METABOLIC 25509     ALK PHOS            70 U/L    09/10/

2015 Unknown

 

          COMPREHENSIVE METABOLIC 84850     SODIUM              142 MMOL/L 09/10

/2015 Unknown

 

          COMPREHENSIVE METABOLIC 76325     CREATININE           1.16 MG/DL  Unknown

 

          COMPREHENSIVE METABOLIC 39258     CALCIUM             9.4 MG/DL 09/10/

2015 Unknown

 

          COMPREHENSIVE METABOLIC 88088     POTASSIUM           4.6 MMOL/L 09/10

/2015 Unknown

 

          COMPREHENSIVE METABOLIC 55270     PROT TOT            6.2 GM/DL 09/10/

2015 Unknown

 

          COMPREHENSIVE METABOLIC 46440     Glucose             90 MG/DL  09/10/

2015 Unknown

 

          COMPREHENSIVE METABOLIC 32962     BICARB              24 MMOL/L 09/10/

2015 Unknown

 

          COMPREHENSIVE METABOLIC 83353     ANION GAP           7 MEQ/L   09/10/

2015 Unknown

 

          THYROID STIMULATING HORMONE 58654     TSH                 2.427 uIU/ML

 2015 Unknown

 

          LIPID GROUP [id number]     HDL TEST            47 MG/DL  2015 Unknown

 

          LIPID GROUP [id number]     TRIG                145 MG/DL 2015 Unknown

 

          LIPID GROUP 10989     TEST LDL            73 MG/DL  2015 Unknown

 

          LIPID GROUP 53219     CHOL                149 MG/DL 2015 Unknown

 

          LIPID GROUP 73075     RCHOL/HDL           3.17 RATIO 2015 Unknow

n

 

          LIPID GROUP 06629     NON-HDL CH           102 MG/DL 2015 Unknow

n

 

          COMPREHENSIVE METABOLIC 64863     AST                 17 U/L    2015 Unknown

 

          COMPREHENSIVE METABOLIC 54132     ALT                 9 IU/L    2015 Unknown

 

          COMPREHENSIVE METABOLIC 72198     BUN                 19 MG/DL  2015 Unknown

 

          COMPREHENSIVE METABOLIC 09029     ALBUMIN             4.3 GM/DL 2015 Unknown

 

          COMPREHENSIVE METABOLIC 79990     CHLORIDE            108 MMOL/L  Unknown

 

          COMPREHENSIVE METABOLIC 89322     BILI TOT            0.5 MG/DL 2015 Unknown

 

          COMPREHENSIVE METABOLIC 37502     ALK PHOS            68 U/L    2015 Unknown

 

          COMPREHENSIVE METABOLIC 55127     SODIUM              140 MMOL/L  Unknown

 

          COMPREHENSIVE METABOLIC 05336     CREATININE           1.08 MG/DL 2015 Unknown

 

          COMPREHENSIVE METABOLIC 73975     CALCIUM             9.9 MG/DL 2015 Unknown

 

          COMPREHENSIVE METABOLIC 60988     POTASSIUM           4.3 MMOL/L  Unknown

 

          COMPREHENSIVE METABOLIC 82581     PROT TOT            7.2 GM/DL 2015 Unknown

 

          COMPREHENSIVE METABOLIC 70269     Glucose             94 MG/DL  2015 Unknown

 

          COMPREHENSIVE METABOLIC 56788     BICARB              26 MMOL/L 2015 Unknown

 

          COMPREHENSIVE METABOLIC 64851     ANION GAP           6 MEQ/L   2015 Unknown

 

          GFR CALC  4418169   GFR AA              60.0L ML/MIN 2015 Unknow

n

 

          GFR CALC  0136177   GFR NON-AA           49.0L ML/MIN 2015 Unkno

wn

 

          GLYCOSYLATED HEMOGLOBIN TEST 56963     A1C HPLC  34211-0   5.6 %     0

3/06/2015 Unknown

 

          COMPLETE BLOOD COUNT 1059542   WBC                 7.2 10e9/L 20

15 Unknown

 

          COMPLETE BLOOD COUNT [id number]   RBC                 4.28 10e12/L 2015 Unknown

 

          COMPLETE BLOOD COUNT 5272110   HGB                 12.8 g/dL 

5 Unknown

 

          COMPLETE BLOOD COUNT 6209263   HCT DET             39.3 %    

5 Unknown

 

          COMPLETE BLOOD COUNT 5384207   MCV                 91.8 fL   

5 Unknown

 

          COMPLETE BLOOD COUNT 1626971   MCH                 29.9 pg   

5 Unknown

 

          COMPLETE BLOOD COUNT [id number]   MCHC                32.6 g/dL 

5 Unknown

 

          COMPLETE BLOOD COUNT 6889149   PLT                 189 10e9/L 20

15 Unknown

 

          COMPLETE BLOOD COUNT 8021984   MPV                 11.2 fL   

5 Unknown

 

          COMPLETE BLOOD COUNT 6336972   ARIK %               38.0 %    

5 Unknown

 

          COMPLETE BLOOD COUNT 7333683   LY %                51.0 %    

5 Unknown

 

          COMPLETE BLOOD COUNT 9241242   MON %               7.7 %     

5 Unknown

 

          COMPLETE BLOOD COUNT 7253277   EOS  %              2.9 %     

5 Unknown

 

          COMPLETE BLOOD COUNT [id number]   BASO %              0.4 %     

5 Unknown

 

          COMPLETE BLOOD COUNT 5565162   RDW                 14.0 %    

5 Unknown

 

          COMPLETE BLOOD COUNT 7863724   ABS ARIK             2.74 10e9/L 

015 Unknown

 

          COMPLETE BLOOD COUNT 7659559   ABS LYMPH           3.67 10e9/L 

015 Unknown

 

          COMPLETE BLOOD COUNT 9228933   ABS MONO            0.55 10e9/L 

015 Unknown

 

          COMPLETE BLOOD COUNT 0859984   ABS EOS             0.21 10e9/L 

015 Unknown

 

          COMPLETE BLOOD COUNT 3771403   ABS BASO            0.03 10e9/L 

015 Unknown

 

          COMPLETE BLOOD COUNT [id number]   RDW-SD              46.1 fL   

5 Unknown

 

          FREE T4   24413     FREE T4             1.14 NG/DL 2015 Unknown

 

          GLYCOSYLATED HEMOGLOBIN TEST 23701     A1C HPLC  10636-8   5.2 %     0

2014 Unknown

 

          FREE T4   78012     FREE T4             1.40 NG/DL 2014 Unknown

 

          GFR CALC  4862310   GFR AA              >60 ML/MIN 2014 Unknown

 

          GFR CALC  3875895   GFR NON-AA           52.0L ML/MIN 2014 Unkno

wn

 

          COMPREHENSIVE METABOLIC 71413     AST                 15 U/L    2014 Unknown

 

          COMPREHENSIVE METABOLIC 72702     ALT                 9 IU/L    2014 Unknown

 

          COMPREHENSIVE METABOLIC 59109     BUN                 17 MG/DL  2014 Unknown

 

          COMPREHENSIVE METABOLIC 26658     ALBUMIN             4.0 GM/DL 2014 Unknown

 

          COMPREHENSIVE METABOLIC 53842     CHLORIDE            112 MMOL/L  Unknown

 

          COMPREHENSIVE METABOLIC 15821     BILI TOT            0.5 MG/DL 2014 Unknown

 

          COMPREHENSIVE METABOLIC 79097     ALK PHOS            66 U/L    2014 Unknown

 

          COMPREHENSIVE METABOLIC 40677     SODIUM              140 MMOL/L  Unknown

 

          COMPREHENSIVE METABOLIC 20821     CREATININE           1.03 MG/DL  Unknown

 

          COMPREHENSIVE METABOLIC 70259     CALCIUM             9.5 MG/DL 2014 Unknown

 

          COMPREHENSIVE METABOLIC 56846     POTASSIUM           4.1 MMOL/L  Unknown

 

          COMPREHENSIVE METABOLIC 88577     PROT TOT            6.2 GM/DL 2014 Unknown

 

          COMPREHENSIVE METABOLIC 40147     Glucose             102 MG/DL 2014 Unknown

 

          COMPREHENSIVE METABOLIC 82938     BICARB              23 MMOL/L 2014 Unknown

 

          COMPREHENSIVE METABOLIC 10942     ANION GAP           5 MEQ/L   2014 Unknown

 

          THYROID STIMULATING HORMONE 39605     TSH                 2.074 uIU/ML

 2014 Unknown

 

          VITAMIN B 12 FOLIC ACID 05145|[id number] VIT B 12            423 PG/ML  Unknown

 

          VITAMIN B 12 FOLIC ACID [id number] FOLIC ACID           19.7 NG/ML  Unknown

 

          LIPID GROUP 86311     HDL TEST            40 MG/DL  2014 Unknown

 

          LIPID GROUP [id number]     TRIG                145 MG/DL 2014 Unknown

 

          LIPID GROUP [id number]     TEST LDL            81 MG/DL  2014 Unknown

 

          LIPID GROUP 62869     CHOL                150 MG/DL 2014 Unknown

 

          LIPID GROUP [id number]     RCHOL/HDL           3.75 RATIO 2014 Unknow

n

 

          COMPLETE BLOOD COUNT [id number]   WBC                 6.0 10e9/L 20

14 Unknown

 

          COMPLETE BLOOD COUNT 0817946   RBC                 4.26 10e12/L 2014 Unknown

 

          COMPLETE BLOOD COUNT 0297890   HGB                 12.7 g/dL 

4 Unknown

 

          COMPLETE BLOOD COUNT 8159168   HCT DET             38.7 %    

4 Unknown

 

          COMPLETE BLOOD COUNT 8757071   MCV                 90.8 fL   

4 Unknown

 

          COMPLETE BLOOD COUNT 8471768   MCH                 29.8 pg   

4 Unknown

 

          COMPLETE BLOOD COUNT 8543599   MCHC                32.8 g/dL 

4 Unknown

 

          COMPLETE BLOOD COUNT 4497850   PLT                 178 10e9/L 20

14 Unknown

 

          COMPLETE BLOOD COUNT 6048408   MPV                 11.7 fL   

4 Unknown

 

          COMPLETE BLOOD COUNT 1628032   ARIK %               30.5 %    

4 Unknown

 

          COMPLETE BLOOD COUNT 2991963   LY %                55.4 %    

4 Unknown

 

          COMPLETE BLOOD COUNT [id number]   MON %               9.0 %     

4 Unknown

 

          COMPLETE BLOOD COUNT 6520206   EOS  %              4.4 %     

4 Unknown

 

          COMPLETE BLOOD COUNT 8211841   BASO %              0.7 %     

4 Unknown

 

          COMPLETE BLOOD COUNT [id number]   RDW                 13.3 %    

4 Unknown

 

          COMPLETE BLOOD COUNT [id number]   ABS ARIK             1.83 10e9/L 

014 Unknown

 

          COMPLETE BLOOD COUNT 7483223   ABS LYMPH           3.32 10e9/L 

014 Unknown

 

          COMPLETE BLOOD COUNT [id number]   ABS MONO            0.54 10e9/L 

014 Unknown

 

          COMPLETE BLOOD COUNT 6707797   ABS EOS             0.26 10e9/L 

014 Unknown

 

          COMPLETE BLOOD COUNT 1354498   ABS BASO            0.04 10e9/L 

014 Unknown

 

          COMPLETE BLOOD COUNT [id number]   RDW-SD              43.2 fL   

4 Unknown

 

          HEMOGLOBIN A1C (GLYCOSYLATED) 2175554   A1C [facility]  27446-6   5.5 %     

2012 Unknown

 

          COMPLETE BLOOD COUNT [id number]   WBC                 6.0 10e9/L 20

12 Unknown

 

          COMPLETE BLOOD COUNT [id number]   RBC                 4.22 10e12/L 2012 Unknown

 

          COMPLETE BLOOD COUNT 2952288   HGB                 12.4 g/dL 

2 Unknown

 

          COMPLETE BLOOD COUNT [id number]   HCT DET             38.2 %    

2 Unknown

 

          COMPLETE BLOOD COUNT 5586202   MCV                 90.5 fL   

2 Unknown

 

          COMPLETE BLOOD COUNT 6331769   MCH                 29.4 pg   

2 Unknown

 

          COMPLETE BLOOD COUNT [id number]   MCHC                32.5 g/dL 

2 Unknown

 

          COMPLETE BLOOD COUNT [id number]   PLT                 187 10e9/L 20

12 Unknown

 

          COMPLETE BLOOD COUNT [id number]   MPV                 11.5 fL   

2 Unknown

 

          COMPLETE BLOOD COUNT 7893814   ARIK %               36.4 %    

2 Unknown

 

          COMPLETE BLOOD COUNT [id number]   LY %                51.0 %    

2 Unknown

 

          COMPLETE BLOOD COUNT [id number]   MON %               8.7 %     

2 Unknown

 

          COMPLETE BLOOD COUNT [id number]   EOS  %              3.2 %     

2 Unknown

 

          COMPLETE BLOOD COUNT 9185917   BASO %              0.7 %     

2 Unknown

 

          COMPLETE BLOOD COUNT [id number]   RDW                 13.7 %    

2 Unknown

 

          COMPLETE BLOOD COUNT [id number]   ABS ARIK             2.18 10e9/L 

012 Unknown

 

          COMPLETE BLOOD COUNT [id number]   ABS LYMPH           3.06 10e9/L 

012 Unknown

 

          COMPLETE BLOOD COUNT [id number]   ABS MONO            0.52 10e9/L 

012 Unknown

 

          COMPLETE BLOOD COUNT [id number]   ABS EOS             0.19 10e9/L 

012 Unknown

 

          COMPLETE BLOOD COUNT 7600255   ABS BASO            0.04 10e9/L 

012 Unknown

 

          COMPLETE BLOOD COUNT [id number]   RDW-SD              44.3 fL   

2 Unknown

 

          LIPID GROUP [id number]     HDL TEST            42 MG/DL  2012 Unknown

 

          LIPID GROUP [id number]     TRIG                156 MG/DL 2012 Unknown

 

          LIPID GROUP [id number]     TEST LDL            80 MG/DL  2012 Unknown

 

          LIPID GROUP [id number]     CHOL                153 MG/DL 2012 Unknown

 

          LIPID GROUP [id number]     RCHOL/HDL           3.64 RATIO 2012 Unknow

n

 

          FREE T4   15841     FREE T4             1.22 NG/DL 2012 Unknown

 

          COMPREHENSIVE METABOLIC 81072     AST                 20 U/L    2012 Unknown

 

          COMPREHENSIVE METABOLIC 83515     ALT                 11 IU/L   2012 Unknown

 

          COMPREHENSIVE METABOLIC 22006     BUN                 19 MG/DL  2012 Unknown

 

          COMPREHENSIVE METABOLIC 14902     ALBUMIN             4.3 GM/DL 2012 Unknown

 

          COMPREHENSIVE METABOLIC 35145     CHLORIDE            109 MMOL/L  Unknown

 

          COMPREHENSIVE METABOLIC 42121     BILI TOT            0.6 MG/DL 2012 Unknown

 

          COMPREHENSIVE METABOLIC 72302     ALK PHOS            84 U/L    2012 Unknown

 

          COMPREHENSIVE METABOLIC 47550     SODIUM              142 MMOL/L  Unknown

 

          COMPREHENSIVE METABOLIC 95765     CREATININE           1.09 MG/DL  Unknown

 

          COMPREHENSIVE METABOLIC 75985     CALCIUM             9.8 MG/DL 2012 Unknown

 

          COMPREHENSIVE METABOLIC 44905     POTASSIUM           4.2 MMOL/L  Unknown

 

          COMPREHENSIVE METABOLIC 06951     PROT TOT            6.4 GM/DL 2012 Unknown

 

          COMPREHENSIVE METABOLIC 52091     Glucose             89 MG/DL  2012 Unknown

 

          COMPREHENSIVE METABOLIC 85755     BICARB              25 MMOL/L 2012 Unknown

 

          COMPREHENSIVE METABOLIC 00228     ANION GAP           8 MEQ/L   2012 Unknown

 

          GFR CALC  [id number]   GFR AA              60.0L ML/MIN 2012 Unknow

n

 

          GFR CALC  [id number]   GFR NON-AA           49.0L ML/MIN 2012 Unkno

wn

 

          THYROID STIMULATING HORMONE 62329     TSH                 2.450 uIU/ML

 2012 Unknown

 

          COMPREHENSIVE METABOLIC 02200     AST                 22 U/L    2012 Unknown

 

          COMPREHENSIVE METABOLIC 89704     ALT                 14 IU/L   2012 Unknown

 

          COMPREHENSIVE METABOLIC 10355     BUN                 21 MG/DL  2012 Unknown

 

          COMPREHENSIVE METABOLIC 92696     ALBUMIN             4.3 GM/DL 2012 Unknown

 

          COMPREHENSIVE METABOLIC 95285     CHLORIDE            106 MMOL/L  Unknown

 

          COMPREHENSIVE METABOLIC 42233     BILI TOT            0.4 MG/DL 2012 Unknown

 

          COMPREHENSIVE METABOLIC 03230     ALK PHOS            80 U/L    2012 Unknown

 

          COMPREHENSIVE METABOLIC 95730     SODIUM              141 MMOL/L  Unknown

 

          COMPREHENSIVE METABOLIC 01349     CREATININE           1.13 MG/DL  Unknown

 

          COMPREHENSIVE METABOLIC 57616     CALCIUM             9.4 MG/DL 2012 Unknown

 

          COMPREHENSIVE METABOLIC 35438     POTASSIUM           4.3 MMOL/L  Unknown

 

          COMPREHENSIVE METABOLIC 52285     PROT TOT            6.7 GM/DL 2012 Unknown

 

          COMPREHENSIVE METABOLIC 22794     Glucose             98 MG/DL  2012 Unknown

 

          COMPREHENSIVE METABOLIC 07509     BICARB              25 MMOL/L 2012 Unknown

 

          COMPREHENSIVE METABOLIC 87256     ANION GAP           10 MEQ/L  2012 Unknown

 

          LIPID GROUP [id number]     HDL TEST            44 MG/DL  2012 Unknown

 

          LIPID GROUP [id number]     TRIG                164 MG/DL 2012 Unknown

 

          LIPID GROUP [id number]     TEST LDL            98 MG/DL  2012 Unknown

 

          LIPID GROUP [id number]     CHOL                175 MG/DL 2012 Unknown

 

          LIPID GROUP [id number]     RCHOL/HDL           3.98 RATIO 2012 Unknow

n

 

          COMPLETE BLOOD COUNT 72016     WBC                 6.7 10e9/L 20

12 Unknown

 

          COMPLETE BLOOD COUNT 58716     RBC                 4.36 10e12/L 2012 Unknown

 

          COMPLETE BLOOD COUNT 05585     HGB                 12.9 g/dL 

2 Unknown

 

          COMPLETE BLOOD COUNT 38800     HCT DET             39.4 %    

2 Unknown

 

          COMPLETE BLOOD COUNT 15344     MCV                 90.4 fL   

2 Unknown

 

          COMPLETE BLOOD COUNT 11653     MCH                 29.6 pg   

2 Unknown

 

          COMPLETE BLOOD COUNT 27080     MCHC                32.7 g/dL 

2 Unknown

 

          COMPLETE BLOOD COUNT 93014     PLT                 184 10e9/L 20

12 Unknown

 

          COMPLETE BLOOD COUNT 30071     MPV                 10.9 fL   

2 Unknown

 

          COMPLETE BLOOD COUNT 24054     ARIK %               41.5 %    

2 Unknown

 

          COMPLETE BLOOD COUNT 41514     LY %                45.7 %    

2 Unknown

 

          COMPLETE BLOOD COUNT 86995     MON %               9.4 %     

2 Unknown

 

          COMPLETE BLOOD COUNT 79347     EOS  %              3.0 %     

2 Unknown

 

          COMPLETE BLOOD COUNT 59871     BASO %              0.4 %     

2 Unknown

 

          COMPLETE BLOOD COUNT 90685     RDW                 13.2 %    

2 Unknown

 

          COMPLETE BLOOD COUNT 04296     ABS ARIK             2.78 10e9/L 

012 Unknown

 

          COMPLETE BLOOD COUNT 40153     ABS LYMPH           3.06 10e9/L 

012 Unknown

 

          COMPLETE BLOOD COUNT 79774     ABS MONO            0.63 10e9/L 

012 Unknown

 

          COMPLETE BLOOD COUNT 08172     ABS EOS             0.20 10e9/L 

012 Unknown

 

          COMPLETE BLOOD COUNT 11457     ABS BASO            0.03 10e9/L 

012 Unknown

 

          COMPLETE BLOOD COUNT 10676     RDW-SD              42.3 fL   

2 Unknown

 

          GFR CALC  5932678   GFR AA              57.0L ML/MIN 2012 Unknow

n

 

          GFR CALC  1208157   GFR NON-AA           47.0L ML/MIN 2012 Unkno

wn

 

          THYROID STIMULATING HORMONE 82516     TSH                 2.663 uIU/ML

 2012 Unknown

 

          FREE T4   90372     FREE T4             1.15 NG/DL 2012 Unknown

 

          THYROID STIMULATING HORMONE 17386     TSH                 1.908 uIU/ML

 2011 Unknown

 

          COMPLETE BLOOD COUNT 35628     WBC                 6.4 10e9/L 20

11 Unknown

 

          COMPLETE BLOOD COUNT 59370     RBC                 3.92 10e12/L 2011 Unknown

 

          COMPLETE BLOOD COUNT 94987     HGB                 11.8 g/dL 

1 Unknown

 

          COMPLETE BLOOD COUNT 69466     HCT DET             36.0 %    

1 Unknown

 

          COMPLETE BLOOD COUNT 67421     MCV                 91.8 fL   

1 Unknown

 

          COMPLETE BLOOD COUNT 68404     MCH                 30.1 pg   

1 Unknown

 

          COMPLETE BLOOD COUNT 19928     MCHC                32.8 g/dL 

1 Unknown

 

          COMPLETE BLOOD COUNT 24163     PLT                 176 10e9/L 20

11 Unknown

 

          COMPLETE BLOOD COUNT 94423     MPV                 11.4 fL   

1 Unknown

 

          COMPLETE BLOOD COUNT 46340     RAIK %               50.4 %    

1 Unknown

 

          COMPLETE BLOOD COUNT 60639     LY %                35.5 %    

1 Unknown

 

          COMPLETE BLOOD COUNT 25580     MON %               10.2 %    

1 Unknown

 

          COMPLETE BLOOD COUNT 15198     EOS  %              3.3 %     

1 Unknown

 

          COMPLETE BLOOD COUNT 97466     BASO %              0.6 %     

1 Unknown

 

          COMPLETE BLOOD COUNT 38874     RDW                 13.7 %    

1 Unknown

 

          COMPLETE BLOOD COUNT 45433     ABS ARIK             3.23 10e9/L 

011 Unknown

 

          COMPLETE BLOOD COUNT 60817     ABS LYMPH           2.27 10e9/L 

011 Unknown

 

          COMPLETE BLOOD COUNT 16090     ABS MONO            0.65 10e9/L 

011 Unknown

 

          COMPLETE BLOOD COUNT 98714     ABS EOS             0.21 10e9/L 

011 Unknown

 

          COMPLETE BLOOD COUNT 79423     ABS BASO            0.04 10e9/L 

011 Unknown

 

          COMPLETE BLOOD COUNT 85281     RDW-SD              45.3 fL   

1 Unknown

 

          GFR CALC  3573410   GFR AA              >60 ML/MIN 2011 Unknown

 

          GFR CALC  3412153   GFR NON-AA           53.0L ML/MIN 2011 Unkno

wn

 

          FREE T4   51657     FREE T4             1.20 NG/DL 2011 Unknown

 

          COMPREHENSIVE METABOLIC 39876     AST                 17 U/L    2011 Unknown

 

          COMPREHENSIVE METABOLIC 63003     ALT                 9 IU/L    2011 Unknown

 

          COMPREHENSIVE METABOLIC 74607     BUN                 16 MG/DL  2011 Unknown

 

          COMPREHENSIVE METABOLIC 18539     ALBUMIN             4.0 GM/DL 2011 Unknown

 

          COMPREHENSIVE METABOLIC 15186     CHLORIDE            108 MMOL/L  Unknown

 

          COMPREHENSIVE METABOLIC 77495     BILI TOT            0.5 MG/DL 2011 Unknown

 

          COMPREHENSIVE METABOLIC 98498     ALK PHOS            76 U/L    2011 Unknown

 

          COMPREHENSIVE METABOLIC 71089     SODIUM              139 MMOL/L  Unknown

 

          COMPREHENSIVE METABOLIC 30971     CREATININE           1.02 MG/DL 2011 Unknown

 

          COMPREHENSIVE METABOLIC 47610     CALCIUM             9.2 MG/DL 2011 Unknown

 

          COMPREHENSIVE METABOLIC 37836     POTASSIUM           4.5 MMOL/L  Unknown

 

          COMPREHENSIVE METABOLIC 42312     PROT TOT            6.1 GM/DL 2011 Unknown

 

          COMPREHENSIVE METABOLIC 00749     Glucose             93 MG/DL  2011 Unknown

 

          COMPREHENSIVE METABOLIC 24569     BICARB              26 MMOL/L 2011 Unknown

 

          COMPREHENSIVE METABOLIC 34007     ANION GAP           5 MEQ/L   2011 Unknown

 

          LIPID GROUP 54020     HDL TEST            46 MG/DL  2011 Unknown

 

          LIPID GROUP 30893     TRIG                102 MG/DL 2011 Unknown

 

          LIPID GROUP 73979     TEST LDL            88 MG/DL  2011 Unknown

 

          LIPID GROUP 18827     CHOL                154 MG/DL 2011 Unknown

 

          LIPID GROUP 98945     RCHOL/HDL           3.35 RATIO 2011 Unknow

n







Procedures





                    Procedure           Codes               Date

 

                    ROUTINE VENIPUNCTURE CPT-4: 08056        2020

 

                    ASSAY THYROID STIM HORMONE CPT-4: 75338        2020

 

                    COMPLETE CBC W/AUTO DIFF WBC CPT-4: 61495        2020

 

                    COMPREHEN METABOLIC PANEL CPT-4: 23025        2020

 

                    ROUTINE VENIPUNCTURE CPT-4: 67531        2019

 

                    LIPID PANEL         CPT-4: 90777        2019

 

                    FLU VACC PRSV FREE INC ANTIG 65 AND OLDER CPT-4: 60470      

  10/22/2019

 

                    FLU VACC PRSV FREE INC ANTIG 65 AND OLDER CPT-4: 33579      

  10/22/2019

 

                    ADMIN INFLUENZA VIRUS VAC CPT-4:         10/22/2019

 

                    COMPREHEN METABOLIC PANEL CPT-4: 95132        10/11/2019

 

                    ROUTINE VENIPUNCTURE CPT-4: 53742        10/11/2019

 

                    ROUTINE VENIPUNCTURE CPT-4: 19325        06/10/2019

 

                    ASSAY THYROID STIM HORMONE CPT-4: 39392        06/10/2019

 

                    COMPREHEN METABOLIC PANEL CPT-4: 61703        06/10/2019

 

                    COMPLETE CBC W/AUTO DIFF WBC CPT-4: 34121        06/10/2019

 

                    URINALYSIS NONAUTO W/O SCOPE CPT-4: 26782        2019

 

                    URINE CULTURE/ COLONY COUNT CPT-4: 00770        2019

 

                    URINE CULTURE/ COLONY COUNT CPT-4: 43186        10/02/2018

 

                    ROUTINE VENIPUNCTURE CPT-4: 81790        2018

 

                    ASSAY OF FREE THYROXINE CPT-4: 67966        2018

 

                    ASSAY THYROID STIM HORMONE CPT-4: 24799        2018

 

                    COMPLETE CBC W/AUTO DIFF WBC CPT-4: 57691        2018

 

                    METABOLIC PANEL TOTAL CA CPT-4: 52516        2018

 

                    FLU VACC PRSV FREE INC ANTIG 65 AND OLDER CPT-4: 97946      

  2018

 

                    ASSAY, GLUCOSE, BLOOD QUANT CPT-4: 65113        2018

 

                    ADMIN INFLUENZA VIRUS VAC CPT-4:         2018

 

                    ROUTINE VENIPUNCTURE CPT-4: 02800        2018

 

                    COMPREHEN METABOLIC PANEL CPT-4: 33393        2018

 

                    COMPLETE CBC W/AUTO DIFF WBC CPT-4: 11808        2018

 

                    A1C HPLC            CPT-4: 87286        2018

 

                    ASSAY OF TROPONIN QUANT CPT-4: 08896        2018

 

                    LIPID PANEL         CPT-4: 78171        2018

 

                    THER/PROPH/DIAG INJ SC/IM CPT-4: 79308        2018

 

                    TRIAMCINOLONE ACET INJ NOS CPT-4:         2018

 

                    URINALYSIS NONAUTO W/O SCOPE CPT-4: 27893        2018

 

                    URINE CULTURE/ COLONY COUNT CPT-4: 18258        2018

 

                    FLU VACC PRSV FREE INC ANTIG 65 AND OLDER CPT-4: 97679      

  10/06/2017

 

                    ADMIN INFLUENZA VIRUS VAC CPT-4:         10/06/2017

 

                    ROUTINE VENIPUNCTURE CPT-4: 41764        2017

 

                    COMPREHEN METABOLIC PANEL CPT-4: 78238        2017

 

                    COMPLETE CBC W/AUTO DIFF WBC CPT-4: 19092        2017

 

                    LIPID PANEL         CPT-4: 80479        2017

 

                    A1C HPLC            CPT-4: 51767        2017

 

                    ASSAY THYROID STIM HORMONE CPT-4: 72071        2017

 

                    ROUTINE VENIPUNCTURE CPT-4: 80890        2017

 

                    ASSAY THYROID STIM HORMONE CPT-4: 62597        2017

 

                    COMPREHEN METABOLIC PANEL CPT-4: 81331        2017

 

                    COMPLETE CBC W/AUTO DIFF WBC CPT-4: 92144        2017

 

                    A1C HPLC            CPT-4: 20956        2017

 

                    FLU VACC PRSV FREE INC ANTIG 65 AND OLDER CPT-4: 27795      

  10/07/2016

 

                    ADMIN INFLUENZA VIRUS VAC CPT-4:         10/07/2016

 

                    ROUTINE VENIPUNCTURE CPT-4: 41902        2016

 

                    ASSAY OF FREE THYROXINE CPT-4: 35131        2016

 

                    ASSAY THYROID STIM HORMONE CPT-4: 24893        2016

 

                    COMPREHEN METABOLIC PANEL CPT-4: 87168        2016

 

                    COMPLETE CBC W/AUTO DIFF WBC CPT-4: 64877        2016

 

                    LIPID PANEL         CPT-4: 99053        2016

 

                    A1C HPLC            CPT-4: 52467        2016

 

                    URINALYSIS NONAUTO W/O SCOPE CPT-4: 40630        2016

 

                    ROUTINE VENIPUNCTURE CPT-4: 40399        2015

 

                    METABOLIC PANEL TOTAL CA CPT-4: 28111        2015

 

                    PRESCRIP TRANSMIT VIA ERX SY CPT-4:         2015

 

                    FLU VACC PRSV FREE INC ANTIG 65 AND OLDER CPT-4: 49113      

  10/16/2015

 

                    ADMIN INFLUENZA VIRUS VAC CPT-4:         10/16/2015

 

                    URINALYSIS NONAUTO W/O SCOPE CPT-4: 38585        09/15/2015

 

                    URINE CULTURE/ COLONY COUNT CPT-4: 28193        09/15/2015

 

                    ROUTINE VENIPUNCTURE CPT-4: 77583        09/10/2015

 

                    ASSAY OF FREE THYROXINE CPT-4: 68871        09/10/2015

 

                    ASSAY THYROID STIM HORMONE CPT-4: 32184        09/10/2015

 

                    COMPREHEN METABOLIC PANEL CPT-4: 77949        09/10/2015

 

                    COMPLETE CBC W/AUTO DIFF WBC CPT-4: 10585        09/10/2015

 

                    LIPID PANEL         CPT-4: 96393        09/10/2015

 

                    A1C HPLC            CPT-4: 97785        09/10/2015

 

                    CERUM REMOVAL       CPT-4: 43141        2015

 

                    PRESCRIP TRANSMIT VIA ERX SY CPT-4:         2015

 

                    FLUZONE, 5ML (Medicare) CPT-4:         10/17/2014

 

                    ADMIN INFLUENZA VIRUS VAC CPT-4:         10/17/2014

 

                    PRESCRIP TRANSMIT VIA ERX SY CPT-4:         2014

 

                    PRESCRIP TRANSMIT VIA ERX SY CPT-4:         2014

 

                    PRESCRIP TRANSMIT VIA ERX SY CPT-4:         2014

 

                    ROUTINE VENIPUNCTURE CPT-4: 90841        2014

 

                    ASSAY OF FREE THYROXINE CPT-4: 72368        2014

 

                    ASSAY THYROID STIM HORMONE CPT-4: 47344        2014

 

                    COMPREHEN METABOLIC PANEL CPT-4: 78121        2014

 

                    COMPLETE CBC W/AUTO DIFF WBC CPT-4: 89851        2014

 

                    LIPID PANEL         CPT-4: 67156        2014

 

                    A1C HPLC            CPT-4: 78974        2014

 

                    VITAMIN B 12 FOLIC ACID CPT-4: 19601|87552  2014

 

                    PRESCRIP TRANSMIT VIA ERX SY CPT-4:         2014

 

                    PRESCRIP TRANSMIT VIA ERX SY CPT-4:         10/15/2013

 

                    FLUZONE, 5ML (Medicare) CPT-4:         2013

 

                    ADMIN INFLUENZA VIRUS VAC CPT-4:         2013

 

                    PRESCRIP TRANSMIT VIA ERX SY CPT-4:         2013

 

                    PRESCRIP TRANSMIT VIA ERX SY CPT-4:         05/10/2013

 

                    ROUTINE VENIPUNCTURE CPT-4: 41486        2012

 

                    ASSAY OF FREE THYROXINE CPT-4: 55599        2012

 

                    ASSAY THYROID STIM HORMONE CPT-4: 62801        2012

 

                    COMPREHEN METABOLIC PANEL CPT-4: 02070        2012

 

                    COMPLETE CBC W/AUTO DIFF WBC CPT-4: 16161        2012

 

                    LIPID PANEL         CPT-4: 40277        2012

 

                    A1C GLYCOSYLATED HEMOGLOBIN TEST CPT-4: 74520        

012

 

                    CERUM REMOVAL       CPT-4: 39962        2012

 

                    PRESCRIP TRANSMIT VIA ERX SY CPT-4:         2012

 

                    PRESCRIP TRANSMIT VIA ERX SY CPT-4:         10/10/2012

 

                    FLUZONE, 5ML (Medicare) CPT-4:         2012

 

                    ADMIN INFLUENZA VIRUS VAC CPT-4:         2012

 

                    ASSAY, GLUCOSE, BLOOD QUANT CPT-4: 64174        2012

 

                    URINALYSIS NONAUTO W/O SCOPE CPT-4: 60499        2012

 

                    URINE CULTURE/ COLONY COUNT CPT-4: 55332        2012

 

                    ROUTINE VENIPUNCTURE CPT-4: 10389        2012

 

                    ASSAY OF FREE THYROXINE CPT-4: 90054        2012

 

                    ASSAY THYROID STIM HORMONE CPT-4: 74229        2012

 

                    COMPREHEN METABOLIC PANEL CPT-4: 50493        2012

 

                    COMPLETE CBC W/AUTO DIFF WBC CPT-4: 68873        2012

 

                    LIPID PANEL         CPT-4: 06403        2012

 

                    ASSAY OF INSULIN    CPT-4: 13244        2012

 

                    A1C GLYCOSYLATED HEMOGLOBIN TEST CPT-4: 54654        

012

 

                    DRAIN/INJECT JOINT/BURSA CPT-4: 28801        2012

 

                    METHYLPREDNISOLONE 40 MG INJ CPT-4:         2012

 

                    TRIAMCINOLONE ACET INJ NOS CPT-4:         2012

 

                    PRESCRIP TRANSMIT VIA ERX SY CPT-4:         2012

 

                    PRESCRIP TRANSMIT VIA ERX SY CPT-4:         2012

 

                    METHYLPREDNISOLONE 40 MG INJ CPT-4:         2012

 

                    DRAIN/INJECT JOINT/BURSA CPT-4: 91422        2012

 

                    TRIAMCINOLONE ACET INJ NOS CPT-4:         2012

 

                    PRESCRIP TRANSMIT VIA ERX SY CPT-4:         2012

 

                    ROUTINE VENIPUNCTURE CPT-4: 54009        2012

 

                    ASSAY OF FREE THYROXINE CPT-4: 91234        2012

 

                    ASSAY THYROID STIM HORMONE CPT-4: 90708        2012

 

                    COMPREHEN METABOLIC PANEL CPT-4: 68981        2012

 

                    COMPLETE CBC W/AUTO DIFF WBC CPT-4: 08037        2012

 

                    LIPID PANEL         CPT-4: 12218        2012

 

                    PRESCRIP TRANSMIT VIA ERX SY CPT-4:         2012

 

                    CERUM REMOVAL       CPT-4: 85927        2011

 

                    PRESCRIP TRANSMIT VIA ERX SY CPT-4:         2011

 

                    FLUZONE, 5ML (Medicare) CPT-4:         10/05/2011

 

                    ADMIN INFLUENZA VIRUS VAC CPT-4:         10/05/2011

 

                    PRESCRIP TRANSMIT VIA ERX SY CPT-4:         2011

 

                    URINALYSIS NONAUTO W/O SCOPE CPT-4: 14763        2011

 

                    URINE CULTURE/ COLONY COUNT CPT-4: 34665        2011

 

                    CUR TOBACCO NON-USER CPT-4:         2011

 

                    ROUTINE VENIPUNCTURE CPT-4: 73362        2011

 

                    COMPLETE CBC W/AUTO DIFF WBC CPT-4: 27078        2011

 

                    COMPREHEN METABOLIC PANEL CPT-4: 65502        2011

 

                    LIPID PANEL         CPT-4: 84861        2011

 

                    ASSAY THYROID STIM HORMONE CPT-4: 35513        2011

 

                    ASSAY OF FREE THYROXINE CPT-4: 76607        2011

 

                    PRESCRIP TRANSMIT VIA ERX SY CPT-4:         2011

 

                    INJ TRIGGER POINT 1/2 MUSCL CPT-4: 49895        2011

 

                    TRIAMCINOLONE ACET INJ NOS CPT-4:         2011

 

                    METHYLPREDNISOLONE 40 MG INJ CPT-4:         2011

 

                    THER/PROPH/DIAG INJ SC/IM CPT-4: 93828        2011

 

                    KETOROLAC TROMETHAMINE INJ CPT-4:         2011

 

                    PRESCRIP TRANSMIT VIA ERX SY CPT-4:         2010

 

                    FLU VACCINE 3 YRS & > IM UP 64 CPT-4: 74034        10/06/201

0

 

                    ADMIN INFLUENZA VIRUS VAC CPT-4:         10/06/2010

 

                    URINALYSIS NONAUTO W/O SCOPE CPT-4: 88764        2010

 

                    URINE CULTURE/ COLONY COUNT CPT-4: 46113        2010

 

                    PRESCRIP TRANSMIT VIA ERX SY CPT-4:         2010

 

                    THER/PROPH/DIAG INJ SC/IM CPT-4: 98452        2010

 

                    VITAMIN B12 INJECTION CPT-4:         2010

 

                    THER/PROPH/DIAG INJ SC/IM CPT-4: 85041        2010

 

                    VITAMIN B12 INJECTION CPT-4:         2010

 

                    ROUTINE VENIPUNCTURE CPT-4: 92152        2010







Vital Signs





                          Date                      Vital

 

                    2020          Blood Pressure 1: 144/82 Code: 8480-6 He

art Rate 1: 56 bpm

 

                2020      Blood Pressure 1: 154/86 Code: 8480-6 Heart Rate

 1: 64 bpm 

Respiratory Rate: 20 bpm SpO2: 99%           Temperature: 37.1 (C) / 98.7 (F) We

ight: 215 

lbs 

 

             2019   Blood Pressure 1: 140/70 Code: 8480-6 Heart Rate 1: 57

 bpm SpO2: 99%    

Temperature: 35.9 (C) / 96.6 (F)        Weight: 215 lbs 

 

                06/10/2019      Blood Pressure 1: 144/70 Code: 8480-6 Heart Rate

 1: 60 bpm 

Respiratory Rate: 20 bpm SpO2: 95%           Temperature: 36.4 (C) / 97.6 (F) We

ight: 214 

lbs 

 

                2019      Blood Pressure 1: 128/78 Code: 8480-6 Heart Rate

 1: 56 bpm 

Respiratory Rate: 20 bpm SpO2: 98%           Temperature: 36.3 (C) / 97.4 (F) We

ight: 213 

lbs 

 

                2018      Blood Pressure 1: 142/60 Code: 8480-6 BMI: 38.2 

Code: 13212-2 Heart 

Rate 1: 48 bpm  Height: 5'2"    Respiratory Rate: 20 bpm SpO2: 98%       Tempera

ture: 36.7

(C) / 98.1 (F)                          Weight: 212 lbs 

 

                2018      Blood Pressure 1: 142/64 Code: 8480-6 BMI: 38.5 

Code: 60811-9 Heart 

Rate 1: 52 bpm  Height: 5'2"    Respiratory Rate: 22 bpm SpO2: 96%       Tempera

ture: 36.1

(C) / 96.9 (F)                          Weight: 214 lbs 

 

                10/02/2018      Blood Pressure 1: 124/80 Code: 8480-6 BMI: 38.3 

Code: 47923-0 Heart 

Rate 1: 68 bpm      Height: 5'2"        Respiratory Rate: 20 bpm Temperature: 36

.3 (C) / 

97.4 (F)                                Weight: 213 lbs 

 

                2018      Blood Pressure 1: 132/78 Code: 8480-6 BMI: 37.6 

Code: 96926-3 Heart 

Rate 1: 68 bpm  Height: 5'2"    Respiratory Rate: 20 bpm SpO2: 97%       Tempera

ture: 36.8

(C) / 98.2 (F)                          Weight: 209 lbs 

 

                2018      Blood Pressure 1: 150/76 Code: 8480-6 BMI: 38.5 

Code: 51585-0 Heart 

Rate 1: 64 bpm  Height: 5'2"    Respiratory Rate: 20 bpm SpO2: 97%       Tempera

ture: 36.2

(C) / 97.2 (F)                          Weight: 214 lbs 

 

                2018      Blood Pressure 1: 122/74 Code: 8480-6 BMI: 38.2 

Code: 46217-5 Heart 

Rate 1: 64 bpm  Height: 5'2"    Respiratory Rate: 18 bpm SpO2: 96%       Tempera

ture: 35.8

(C) / 96.4 (F)                          Weight: 212 lbs 

 

                2018      Blood Pressure 1: 124/78 Code: 8480-6 BMI: 37.8 

Code: 78851-9 Heart 

Rate 1: 76 bpm      Height: 5'2"        Respiratory Rate: 20 bpm Temperature: 36

.8 (C) / 

98.3 (F)                                Weight: 210 lbs 

 

                2018      Blood Pressure 1: 136/70 Code: 8480-6 BMI: 38.0 

Code: 38538-3 Heart 

Rate 1: 68 bpm  Height: 5'2"    Respiratory Rate: 20 bpm SpO2: 97%       Tempera

ture: 36.8

(C) / 98.2 (F)                          Weight: 211 lbs 

 

                2018      Blood Pressure 1: 140/65 Code: 8480-6 Heart Rate

 1: 75 bpm 

Respiratory Rate: 24 bpm SpO2: 95%           Temperature: 37.0 (C) / 98.6 (F) We

ight: 211 

lbs 

 

                2018      Blood Pressure 1: 154/70 Code: 8480-6 BMI: 37.6 

Code: 93309-9 Heart 

Rate 1: 76 bpm  Height: 5'2"    Respiratory Rate: 20 bpm SpO2: 98%       Tempera

ture: 36.9

(C) / 98.5 (F)                          Weight: 209 lbs 

 

                2017      Blood Pressure 1: 156/70 Code: 8480-6 BMI: 37.1 

Code: 15663-1 Heart 

Rate 1: 72 bpm  Height: 5'2"    Respiratory Rate: 20 bpm SpO2: 97%       Tempera

ture: 37.0

(C) / 98.6 (F)                          Weight: 206 lbs 

 

                2017      Blood Pressure 1: 152/78 Code: 8480-6 BMI: 36.8 

Code: 93254-8 Heart 

Rate 1: 78 bpm  Height: 5'2"    Respiratory Rate: 20 bpm SpO2: 98%       Tempera

ture: 36.1

(C) / 97.0 (F)                          Weight: 204 lbs 

 

                2017      Blood Pressure 1: 142/70 Code: 8480-6 BMI: 36.9 

Code: 33199-9 Heart 

Rate 1: 64 bpm  Height: 5'2"    Respiratory Rate: 20 bpm SpO2: 96%       Tempera

ture: 36.5

(C) / 97.7 (F)                          Weight: 205 lbs 

 

                2017      Blood Pressure 1: 142/68 Code: 8480-6 Heart Rate

 1: 88 bpm 

Respiratory Rate: 18 bpm SpO2: 98%           Temperature: 36.3 (C) / 97.3 (F) We

ight: 209 

lbs 

 

                2016      Blood Pressure 1: 130/78 Code: 8480-6 Heart Rate

 1: 68 bpm 

Respiratory Rate: 20 bpm SpO2: 95%           Temperature: 36.4 (C) / 97.6 (F) We

ight: 212 

lbs 

 

                2016      Blood Pressure 1: 122/64 Code: 8480-6 BMI: 39.1 

Code: 26620-3 Heart 

Rate 1: 76 bpm      Height: 5'2"        Respiratory Rate: 20 bpm Temperature: 36

.8 (C) / 

98.2 (F)                                Weight: 217 lbs 

 

                2016      Blood Pressure 1: 144/70 Code: 8480-6 BMI: 39.4 

Code: 75675-7 Heart 

Rate 1: 76 bpm      Height: 5'2"        Respiratory Rate: 20 bpm Temperature: 36

.6 (C) / 

97.9 (F)                                Weight: 219 lbs 

 

                2015      Blood Pressure 1: 152/60 Code: 8480-6 BMI: 39.6 

Code: 48607-7 Heart 

Rate 1: 84 bpm      Height: 5'2"        Respiratory Rate: 20 bpm Temperature: 37

.0 (C) / 

98.6 (F)                                Weight: 220 lbs 

 

                2015      Blood Pressure 1: 146/76 Code: 8480-6 BMI: 39.8 

Code: 51712-7 Heart 

Rate 1: 88 bpm      Height: 5'2"        Respiratory Rate: 20 bpm Temperature: 37

.0 (C) / 

98.6 (F)                                Weight: 221 lbs 

 

                2015      Blood Pressure 1: 132/70 Code: 8480-6 BMI: 39.1 

Code: 92213-1 Heart 

Rate 1: 88 bpm      Height: 5'2"        Respiratory Rate: 20 bpm Temperature: 36

.4 (C) / 

97.6 (F)                                Weight: 217 lbs 

 

                2015      Blood Pressure 1: 132/66 Code: 8480-6 BMI: 39.9 

Code: 30279-6 Heart 

Rate 1: 72 bpm      Height: 5'2"        Respiratory Rate: 20 bpm Temperature: 36

.9 (C) / 

98.4 (F)                                Weight: 218 lbs 

 

                2015      Blood Pressure 1: 136/80 Code: 8480-6 Heart Rate

 1: 76 bpm 

Respiratory Rate: 20 bpm  Temperature: 36.7 (C) / 98.0 (F) Weight: 224 lbs 

 

                2015      Blood Pressure 1: 134/78 Code: 8480-6 BMI: 39.7 

Code: 81868-4 Heart 

Rate 1: 84 bpm      Height: 5'2"        Respiratory Rate: 20 bpm Temperature: 36

.7 (C) / 

98.0 (F)                                Weight: 217 lbs 

 

                2014      Blood Pressure 1: 146/78 Code: 8480-6 BMI: 39.5 

Code: 68365-8 Heart 

Rate 1: 82 bpm      Height: 5'2"        Respiratory Rate: 18 bpm Temperature: 35

.6 (C) / 

96.1 (F)                                Weight: 216 lbs 

 

                2014      Blood Pressure 1: 134/70 Code: 8480-6 BMI: 37.9 

Code: 64859-1 Heart 

Rate 1: 80 bpm      Height: 5'3"        Respiratory Rate: 20 bpm Temperature: 36

.8 (C) / 

98.2 (F)                                Weight: 214 lbs 

 

                2014      Blood Pressure 1: 132/70 Code: 8480-6 BMI: 37.6 

Code: 49303-6 Heart 

Rate 1: 80 bpm      Height: 5'3"        Respiratory Rate: 20 bpm Temperature: 36

.8 (C) / 

98.3 (F)                                Weight: 212 lbs 

 

                2014      Blood Pressure 1: 116/74 Code: 8480-6 Heart Rate

 1: 68 bpm 

Respiratory Rate: 20 bpm  Temperature: 36.2 (C) / 97.1 (F) Weight: 212 lbs 

 

                10/15/2013      Blood Pressure 1: 132/82 Code: 8480-6 BMI: 37.4 

Code: 82814-0 Heart 

Rate 1: 76 bpm      Height: 5'3"        Respiratory Rate: 20 bpm Temperature: 36

.7 (C) / 

98.0 (F)                                Weight: 211 lbs 

 

                    2013          Blood Pressure 1: 130/76 Code: 8480-6 He

art Rate 1: 78 bpm

 

                2013      Blood Pressure 1: 140/82 Code: 8480-6 BMI: 36.8 

Code: 26061-3 Heart 

Rate 1: 66 bpm      Height: 5'3"        Respiratory Rate: 20 bpm Temperature: 36

.1 (C) / 

96.9 (F)                                Weight: 208 lbs 

 

                2013      Blood Pressure 1: 138/80 Code: 8480-6 BMI: 36.4 

Code: 76444-3 Heart 

Rate 1: 72 bpm      Height: 5'4"        Respiratory Rate: 20 bpm Temperature: 36

.7 (C) / 

98.0 (F)                                Weight: 212 lbs 

 

                2013      Blood Pressure 1: 124/70 Code: 8480-6 BMI: 36.9 

Code: 51801-2 Heart 

Rate 1: 60 bpm      Height: 5'4"        Temperature: 36.1 (C) / 97.0 (F) Weight:

 215 lbs 

 

                05/10/2013      Blood Pressure 1: 132/86 Code: 8480-6 BMI: 36.9 

Code: 28254-2 Heart 

Rate 1: 76 bpm      Height: 5'4"        Respiratory Rate: 20 bpm Temperature: 36

.8 (C) / 

98.2 (F)                                Weight: 215 lbs 

 

                2013      Blood Pressure 1: 134/82 Code: 8480-6 BMI: 36.6 

Code: 95658-7 Heart 

Rate 1: 72 bpm      Height: 5'4"        Respiratory Rate: 20 bpm Temperature: 36

.3 (C) / 

97.4 (F)                                Weight: 213 lbs 

 

                2012      Blood Pressure 1: 142/80 Code: 8480-6 BMI: 37.1 

Code: 86618-6 Heart 

Rate 1: 76 bpm      Height: 5'4"        Respiratory Rate: 20 bpm Temperature: 36

.8 (C) / 

98.3 (F)                                Weight: 216 lbs 

 

                10/23/2012      Blood Pressure 1: 128/68 Code: 8480-6 BMI: 37.6 

Code: 75557-4 Heart 

Rate 1: 72 bpm      Height: 5'4"        Respiratory Rate: 20 bpm Temperature: 36

.6 (C) / 

97.9 (F)                                Weight: 219 lbs 

 

                10/10/2012      Blood Pressure 1: 122/70 Code: 8480-6 Heart Rate

 1: 76 bpm 

Respiratory Rate: 20 bpm  Temperature: 36.7 (C) / 98.1 (F) Weight: 218 lbs 

 

                    2012          Blood Pressure 1: 132/80 Code: 8480-6 He

art Rate 1: 84 bpm

 

                2012      Blood Pressure 1: 128/78 Code: 8480-6 BMI: 38.1 

Code: 05893-8 Heart 

Rate 1: 84 bpm      Height: 5'4"        Respiratory Rate: 20 bpm Temperature: 36

.9 (C) / 

98.4 (F)                                Weight: 222 lbs 

 

                2012      Blood Pressure 1: 138/80 Code: 8480-6 BMI: 37.9 

Code: 82224-1 Heart 

Rate 1: 74 bpm      Height: 5'4"        Temperature: 36.1 (C) / 97.0 (F) Weight:

 221 lbs 

 

                2012      Blood Pressure 1: 126/78 Code: 8480-6 BMI: 38.4 

Code: 56346-9 Heart 

Rate 1: 72 bpm      Height: 5'4"        Respiratory Rate: 20 bpm Temperature: 36

.7 (C) / 

98.0 (F)                                Weight: 224 lbs 

 

                2012      Blood Pressure 1: 138/72 Code: 8480-6 BMI: 38.4 

Code: 04817-1 Heart 

Rate 1: 72 bpm      Height: 5'4"        Respiratory Rate: 20 bpm Temperature: 36

.6 (C) / 

97.9 (F)                                Weight: 224 lbs 

 

                2012      Blood Pressure 1: 122/78 Code: 8480-6 BMI: 38.8 

Code: 22050-8 Heart 

Rate 1: 88 bpm      Height: 5'4"        Respiratory Rate: 20 bpm Temperature: 36

.6 (C) / 

97.8 (F)                                Weight: 226 lbs 

 

                2012      Blood Pressure 1: 116/60 Code: 8480-6 BMI: 38.1 

Code: 99314-5 Heart 

Rate 1: 92 bpm      Height: 5'4"        Respiratory Rate: 20 bpm Temperature: 36

.8 (C) / 

98.2 (F)                                Weight: 222 lbs 

 

                2011      Blood Pressure 1: 118/62 Code: 8480-6 BMI: 37.9 

Code: 00821-9 Heart 

Rate 1: 80 bpm      Height: 5'4"        Temperature: 36.5 (C) / 97.7 (F) Weight:

 221 lbs 

 

                2011      Blood Pressure 1: 132/70 Code: 8480-6 Heart Rate

 1: 84 bpm 

Respiratory Rate: 20 bpm  Temperature: 36.7 (C) / 98.0 (F) Weight: 221 lbs 

 

                2011      Blood Pressure 1: 114/72 Code: 8480-6 BMI: 37.6 

Code: 81025-3 Heart 

Rate 1: 76 bpm      Height: 5'4"        Respiratory Rate: 20 bpm Temperature: 36

.8 (C) / 

98.3 (F)                                Weight: 219 lbs 

 

                    2011          Blood Pressure 1: 136/76 Code: 8480-6 Te

mperature: 36.0 (C) / 96.8 

(F)                                     Weight: 219 lbs 8 oz

 

                2011      Blood Pressure 1: 128/80 Code: 8480-6 Heart Rate

 1: 72 bpm 

Temperature: 36.3 (C) / 97.4 (F)        Weight: 218 lbs 

 

                2011      Blood Pressure 1: 126/70 Code: 8480-6 Heart Rate

 1: 72 bpm 

Temperature: 36.7 (C) / 98.0 (F)

 

                    2010          Blood Pressure 1: 126/78 Code: 8480-6 Te

mperature: 36.2 (C) / 97.1 

(F)                                     Weight: 217 lbs 8 oz

 

                2010      Blood Pressure 1: 116/70 Code: 8480-6 Heart Rate

 1: 72 bpm 

Temperature: 36.4 (C) / 97.6 (F)        Weight: 222 lbs 

 

                2010      Blood Pressure 1: 114/78 Code: 8480-6 Heart Rate

 1: 72 bpm 

Temperature: 37.1 (C) / 98.8 (F)        Weight: 225 lbs 

 

                2010      Blood Pressure 1: 128/78 Code: 8480-6 Heart Rate

 1: 76 bpm 

Temperature: 36.6 (C) / 97.8 (F)        Weight: 224 lbs 

 

                2010      Blood Pressure 1: 116/78 Code: 8480-6 Heart Rate

 1: 80 bpm 

Temperature: 36.4 (C) / 97.6 (F)        Weight: 226 lbs 

 

                2010      Blood Pressure 1: 120/70 Code: 8480-6 BMI: 38.3 

Code: 65452-5 Heart 

Rate 1: 88 bpm      Height: 5'5"        Temperature: 36.4 (C) / 97.6 (F) Weight:

 230 lbs 







Functional Status

No Functional Status data



Reason For Visit





                    Reason For Visit    Effective Dates     Notes

 

                    blood pressure check 2020           

 

                    high blood pressure 2020           

 

                    lab draw            2019           

 

                    lab draw            10/11/2019           

 

                    hearing loss        2019          left ear

 

                    follow up           06/10/2019           

 

                    follow up           2019          Patient has upcoming

 appointment with Dr Claire and carotid 

dopplers tomorrow

 

                    follow up           2018          Patient had heart ca

th on 10-30-18 and one of the bypass 

veins in spasming

 

                    follow up           2018          4 Week

 

                    follow up           10/02/2018           

 

                    follow up           2018           

 

                    high blood pressure 2018          Dr Claire has ordered

 holter monitor and 

hydralazine 50mg PRN

 

                    dizziness           2018          On  morning darren delvalle woke up and went to the bathroom 

and after lying down became very dizzy. Patient has hx of vertigo so didn't 
think anything of it. She usually just has them intermittently, but had more 
that day. While at [employer] began to feel very ill and became very shaky. She got 
home and sat in the recliner and had the jittery/nervous feeling. Later that 
night it finally resolved. Patient feels like she had a spell like this around 
the  and thought it was due to extreme heat exhaustion.

 

                    follow up           2018           

 

                    back pain           2018           

 

                    otalgia             2018           

 

                    back pain           2018           

 

                    injection(s)        10/06/2017          flu shot

 

                    lab draw            2017           

 

                    follow up           2017           

 

                    pelvic pain         2017          Patient states pain 

started in May with a "Constant Ache"

to left inguinal area. Patient states at that time pain was intermittent. Then, 
approximately 2nd week  of  pain worsened and radiates to left low back 
area.

 

                    lab draw            2017           

 

                    hyperglycemia       2017           

 

                    cough               2017           

 

                    otalgia             2016           

 

                    injection(s)        10/07/2016          Influenza

 

                    lab draw            2016           

 

                    constipation        2016           

 

                    flank pain          2016           

 

                    follow up           2015          3mo fwup

 

                    injection(s)        10/16/2015          Influenza

 

                    acute renal failure 09/15/2015           

 

                    lab draw            09/10/2015           

 

                    fatigue             2015           

 

                    otalgia             2015           

 

                    pain, limb          2015           

 

                    follow up           2015          [facility] fwup

 

                    high blood pressure 2015           

 

                    injection(s)        10/17/2014          flu shot

 

                    sinusitis           2014           

 

                    high blood pressure 2014           

 

                    cough               2014          Was panfilo at Dr Tyree teixeira's office so he started he on amlodipine 

10mg but seems to be dragging her down. Patient decreased to 1/2 tablet this 
last week

 

                    lab draw            2014           

 

                    cough               2014           

 

                    cough               10/15/2013           

 

                    high blood pressure 2013           

 

                    follow up           2013          ER

 

                    dizziness           2013           

 

                    follow up           2013           

 

                    otalgia             05/10/2013           

 

                    breast complaint    2013           

 

                    lab draw            2012           

 

                    follow up           2012          2mo fwup

 

                    follow up           10/23/2012          2wk fwup

 

                    gas and bloating    10/10/2012          Dr Claire has her mon

itoring because was high in his 

office at last visit

 

                    injection(s)        2012           

 

                    dizziness           2012           

 

                    dizziness           2012           

 

                    lab draw            2012           

 

                    muscle weakness     2012          concerns of simvasta

tin with fatigue and muscle 

weakness

 

                    leg pain/sciatica   2012           

 

                    hip pain            2012           

 

                    back pain           2012          has tried ice pack, 

muscle relaxers, and moist heat

 

                    back pain           2012           

 

                    fatigue             2012          in hands/feet

 

                    otalgia             2011           

 

                    follow up           2011          2wk fwup

 

                    back pain           2011          thinks ulcer may hav

e returned

 

                    lab draw            2011           

 

                    dizziness           2011          Left ear and facial 

pain, right ear pain 

 

                    follow up           2011          1wk fwup

 

                    hip pain            2011          feels very draggy, f

atigue, BP 80/63, normally running 

100's/60's

 

                    chills              2010           

 

                    injection(s)        10/06/2010          flu shot

 

                    follow up           2010          6wk fwup, had HH rep

aired and is starting to feel better, 

started on reglan 10mg tid

 

                    follow up           2010          hosp fwup

 

                    follow up           2010          1mo fwup, saw Dr Danna du and hasn't gave cardiac clearance 

yet for HH repair, did have chemical stress test yesterday and waiting on 
results

 

                    follow up           2010          from EGD/colonoscopy

--has Hiatal Hernia and wants to do 

repair

 

                    follow up           2010           







Encounters





             Encounter    Performer    Location     Codes        Date

 

                                        ([id number]) OFFICE/OUTPATIENT VISIT EST

Diagnosis: Essential hypertension[ICD10: I10]

Diagnosis: Palpitations[ICD10: R00.2]

Diagnosis: Stress reaction[ICD10: F43.0] Akanksha DRIVER [employer]            CPT-4: 96438              2020

 

                                        ([id number]) NURSE/OUTPATIENT VISIT EST

Diagnosis: Mixed hyperlipidemia[ICD10: E78.2] Akanksha DRIVER [employer]            CPT-4: 37452              2019

 

                                        (09316) NURSE/OUTPATIENT VISIT EST

Diagnosis: FLU VACCINE[ICD10: Z23] Akanksha KEY [facility]                       CPT-4: 22744              10/22/2019

 

                                        ([id number]) NURSE/OUTPATIENT VISIT EST

Diagnosis: Chronic kidney disease, stage 1[ICD10: N18.1] Akanksha DRIVER [employer] CPT-4: 30167              10/11/2019

 

                                        (60056) OFFICE/OUTPATIENT VISIT EST

Diagnosis: Acute serous otitis media, left ear[ICD10: H65.02] Nat DRIVER [facility] CPT-4: 74049              2019

 

                                        (18491) OFFICE/OUTPATIENT VISIT EST

Diagnosis: Essential (primary) hypertension[ICD10: I10]

Diagnosis: Coronary atherosclerosis due to calcified coronary lesion[ICD10: 
I25.84]

Diagnosis: Hypoglycemia, unspecified[ICD10: E16.2]

Diagnosis: Other fatigue[ICD10: R53.83]

Diagnosis: Urinary tract infection, site not specified[ICD10: N39.0] Akanksha DRIVER [employer] CPT-4: 31712        06/10/2019

 

                                        (86422) OFFICE/OUTPATIENT VISIT EST

Diagnosis: Essential (primary) hypertension[ICD10: I10]

Diagnosis: Gastro-esophageal reflux disease without esophagitis[ICD10: K21.9]

Diagnosis: Urinary tract infection, site not specified[ICD10: N39.0] Akanksha DRIVER DO [facility] CPT-4: 42094        2019

 

                                        (44077) OFFICE/OUTPATIENT VISIT EST

Diagnosis: Gastro-esophageal reflux disease without esophagitis[ICD10: K21.9]

Diagnosis: Epigastric pain[ICD10: R10.13] [employer] [employer] [employer] [facility]            CPT-4: 26435              2018

 

                                        (16746) OFFICE/OUTPATIENT VISIT EST

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

Diagnosis: Essential (primary) hypertension[ICD10: I10]

Diagnosis: Generalized anxiety disorder[ICD10: F41.1]

Diagnosis: Gastro-esophageal reflux disease without esophagitis[ICD10: K21.9] 

Akanksha DRIVER DO [facility] CPT-4: 82233        2018

 

                                        OFFICE/OUTPATIENT VISIT EST

Diagnosis: Gastro-esophageal reflux disease without esophagitis[ICD10: K21.9]

Diagnosis: Palpitations[ICD10: R00.2]

Diagnosis: Urinary tract infection, site not specified[ICD10: N39.0]

Diagnosis: Generalized anxiety disorder[ICD10: F41.1] Akanksha DRIVER [employer] [facility] CPT-4: 45246              10/02/2018

 

                                        (02885) NURSE/OUTPATIENT VISIT EST

Diagnosis: Coronary atherosclerosis due to calcified coronary lesion[ICD10: 
I25.84]

Diagnosis: Hypoglycemia, unspecified[ICD10: E16.2]

Diagnosis: Dizziness and giddiness[ICD10: R42]

Diagnosis: Occlusion and stenosis of bilateral carotid arteries[ICD10: I65.23] 

Akanksha DRIVER DO [facility] CPT-4: 99748        2018

 

                                        OFFICE/OUTPATIENT VISIT EST

Diagnosis: Epigastric pain[ICD10: R10.13]

Diagnosis: Generalized anxiety disorder[ICD10: F41.1]

Diagnosis: FLU VACCINE[ICD10: Z23] Akanksha KEY [employer] 

[facility]                       CPT-4: 71656              2018

 

                                        ([id number]) OFFICE/OUTPATIENT VISIT EST

Diagnosis: Essential (primary) hypertension[ICD10: I10]

Diagnosis: Hypoglycemia, unspecified[ICD10: E16.2]

Diagnosis: Gastro-esophageal reflux disease without esophagitis[ICD10: K21.9] 

Akanksha Xiangrodo BAUMAN OLIVIA DRIVER DO [facility] CPT-4: 57231        2018

 

                                        (60050) OFFICE/OUTPATIENT VISIT EST

Diagnosis: Dizziness and giddiness[ICD10: R42] Nat DRIVER DO [facility]            CPT-4: 57561              2018

 

                                        (82360) OFFICE/OUTPATIENT VISIT EST

Diagnosis: Cervicalgia[ICD10: M54.2]

Diagnosis: Other spondylosis with radiculopathy, cervical region[ICD10: M47.22] 

Akanksha Xiangrodo BAUMAN SAramis KRISTEL CASE [facility] CPT-4: 13522        2018

 

                                        ([id number]) OFFICE/OUTPATIENT VISIT EST

Diagnosis: Hypoglycemia, unspecified[ICD10: E16.2]

Diagnosis: Other spondylosis with radiculopathy, cervical region[ICD10: M47.22]

Diagnosis: Vertigo of central origin, bilateral[ICD10: H81.43]

Diagnosis: Cervicocranial syndrome[ICD10: M53.0] Akanksha Xiangndangela        DORITAANN MARIE SEENE 

SAramis KRISTEL [facility]         CPT-4: 64464              2018

 

                                        (20017) OFFICE/OUTPATIENT VISIT EST

Diagnosis: Benign paroxysmal vertigo, bilateral[ICD10: H81.13]

Diagnosis: Otalgia, bilateral[ICD10: H92.03] Nat DRIVER [employer] [facility]            CPT-4: 08793              2018

 

                                        (51333) OFFICE/OUTPATIENT VISIT EST

Diagnosis: Low back pain[ICD10: M54.5]

Diagnosis: Radiculopathy, lumbosacral region[ICD10: M54.17]

Diagnosis: Left lower quadrant pain[ICD10: R10.32] Akanksha BAKER KRISTEL [employer] [facility]         CPT-4: 54410              2018

 

                                        (85252) OFFICE/OUTPATIENT VISIT EST

Diagnosis: FLU VACCINE[ICD10: Z23] Akanksha BAUMAN SAramis OREND

[facility]                       CPT-4: 29052              10/06/2017

 

                                        ([id number]) OFFICE/OUTPATIENT VISIT EST

Diagnosis: Mixed hyperlipidemia[ICD10: E78.2]

Diagnosis: Essential (primary) hypertension[ICD10: I10]

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

Diagnosis: Impaired fasting glucose[ICD10: R73.01]

Diagnosis: Other fatigue[ICD10: R53.83] [facility]                    CPT-4: 43115              2017

 

                                        ([id number]) OFFICE/OUTPATIENT VISIT EST

Diagnosis: Pain in thoracic spine[ICD10: M54.6]

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 

[facility] CPT-[facility]: 78451        2017

 

                                        (16188) OFFICE/OUTPATIENT VISIT EST

Diagnosis: Left lower quadrant pain[ICD10: R10.32]

Diagnosis: Low back pain[ICD10: M54.5] Akanksha SYKES 

[facility]                    CPT-4: 98107              2017

 

                                        (30377) OFFICE/OUTPATIENT VISIT EST

Diagnosis: Mixed hyperlipidemia[ICD10: E78.2]

Diagnosis: Essential (primary) hypertension[ICD10: I10]

Diagnosis: Hypoglycemia, unspecified[ICD10: E16.2] [facility]         CPT-4: 37845              2017

 

                                        (29448) OFFICE/OUTPATIENT VISIT EST

Diagnosis: Impaired fasting glucose[ICD10: R73.01]

Diagnosis: Mastodynia[ICD10: N64.4]

Diagnosis: Mixed hyperlipidemia[ICD10: E78.2]

Diagnosis: Essential (primary) hypertension[ICD10: I10]

Diagnosis: Other fatigue[ICD10: R53.83] [facility]                    CPT-4: 96064              2017

 

                                        (28726) OFFICE/OUTPATIENT VISIT EST

Diagnosis: Acute upper respiratory infection, unspecified[ICD10: J06.9] Luba Stevenson              [facility] CPT-4: 64852        2017

 

                                        (27971) OFFICE/OUTPATIENT VISIT EST

Diagnosis: Acute mastoiditis without complications, right ear[ICD10: H70.001] 

Akanksha DRIVER DO [facility] CPT-4: 18159        2016

 

                                        (77976) OFFICE/OUTPATIENT VISIT EST

Diagnosis: FLU VACCINE[ICD10: Z23] Akanksha KEY DO 

[facility]                       CPT-4: 45706              10/07/2016

 

                                        (17168) OFFICE/OUTPATIENT VISIT EST

Diagnosis: Mixed hyperlipidemia[ICD10: E78.2]

Diagnosis: Essential (primary) hypertension[ICD10: I10]

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

Diagnosis: Impaired fasting glucose[ICD10: R73.01] Akanksha DRIVER DO [facility]         CPT-4: 09291              2016

 

                                        (75154) OFFICE/OUTPATIENT VISIT EST

Diagnosis: Constipation, unspecified[ICD10: K59.00] Akanksha DRIVER DO [facility] CPT-4: 33466              2016

 

                                        (34473) OFFICE/OUTPATIENT VISIT EST

Diagnosis: Essential (primary) hypertension[ICD10: I10]

Diagnosis: Mixed hyperlipidemia[ICD10: E78.2]

Diagnosis: Chronic kidney disease, stage 1[ICD10: N18.1] Akanksha DRIVER DO [facility] CPT-4: 57265              2016

 

                                        (87777) OFFICE/OUTPATIENT VISIT EST

Diagnosis: Muscle weakness (generalized)[ICD10: M62.81]

Diagnosis: Dizziness and giddiness[ICD10: R42]

Diagnosis: Essential (primary) hypertension[ICD10: I10]

Diagnosis: History of falling[ICD10: Z91.81] Akanksha Xiangndangela        NYARAMOS DRIVER DO [facility]            CPT-4: 62759              2015

 

                                        (85547) OFFICE/OUTPATIENT VISIT EST

Diagnosis: FLU VACCINE[ICD10: Z23] Akanksha HIGHTOWERLINE OLIVIA KEY DO 

[facility]                       CPT-4: 85445              10/16/2015

 

                                        (77375) OFFICE/OUTPATIENT VISIT EST

Diagnosis: Acute renal failure[ICD9: 584.9]

Diagnosis: POLYURIA[ICD9: 788.42] Akanksha HIGHTOWERLINE OLIVIA LANCE [facility]                       CPT-4: 72029              09/15/2015

 

                                        (80932) OFFICE/OUTPATIENT VISIT EST

Diagnosis: HYPERLIPIDEMIA NEC/NOS[ICD9: 272.4]

Diagnosis: HYPERTENSION[ICD9: 401.9]

Diagnosis: CAD[ICD9: 414.00]

Diagnosis: Hyperglycemia[ICD9: 790.29]

Diagnosis: MALAISE AND FATIGUE[ICD9: 780.79] Akanksha Otoolendangela KEVINQUELIN

SHERRY DRIVER [employer] [facility]            CPT-4: 93054              09/10/2015

 

                                        (24082) OFFICE/OUTPATIENT VISIT EST

Diagnosis: MALAISE AND FATIGUE[ICD9: 780.79]

Diagnosis: HYPOGLYCEMIA[ICD9: 251.2]

Diagnosis: Grieving[ICD9: 309.0]

Diagnosis: ABDOMINAL PAIN[ICD9: 789.00] Akanksha Xiangndangela HIGHTOWERLINE SAramis 

KRISTEL

[facility]                    CPT-4: 57489              2015

 

                                        OFFICE/OUTPATIENT VISIT EST

Diagnosis: CERUMEN IMPACTION[ICD9: 380.4]

Diagnosis: EUSTACHIAN TUBE DYSFUNCTION[ICD9: 381.81] Berthasa Mon      

AKANKSHA OLIVIA DRIVER [facility] CPT-4: 64527              2015

 

                                        (12039) OFFICE/OUTPATIENT VISIT EST

Diagnosis: Leg pain[ICD9: 729.5]

Diagnosis: SUPERFIC PHLEBITIS-LEG[ICD9: 451.0] Akanksha ROBERTSON SAramis 

KRISTEL [employer] [facility]            CPT-4: 73899              2015

 

                                        (83727) OFFICE/OUTPATIENT VISIT EST

Diagnosis: Thoracic back pain[ICD9: 724.1]

Diagnosis: SPASM OF MUSCLE[ICD9: 728.85] Akanksha Xiangndangela HIGHTOWERLINE SAramis

 

KRISTEL CASE [facility]            CPT-4: 78712              2015

 

                                        (11374) OFFICE/OUTPATIENT VISIT EST

Diagnosis: DIZZINESS/VERTIGO[ICD9: 780.4]

Diagnosis: Benign positional vertigo[ICD9: 386.11]

Diagnosis: HYPERTENSION[ICD9: 401.9]

Diagnosis: Suspicious nevus[ICD9: 238.2] Akanksha DRIVER [facility]            CPT-4: 43855              2015

 

                                        (54380) OFFICE/OUTPATIENT VISIT EST

Diagnosis: FLU VACCINE[ICD10: Z23] Akanksha KEY [facility]                       CPT-4: 58211              10/17/2014

 

                                        OFFICE/OUTPATIENT VISIT EST

Diagnosis: SINUSITIS, ACUTE[ICD9: 461.9]

Diagnosis: EUSTACHIAN TUBE DYSFUNCTION[ICD9: 381.81] Bertha DRIVER [facility] CPT-4: 88734              2014

 

                                        (44002) OFFICE/OUTPATIENT VISIT EST

Diagnosis: DIZZINESS/VERTIGO[ICD9: 780.4]

Diagnosis: HYPERTENSION[ICD9: 401.9] [facility]                       CPT-4: 22902              2014

 

                                        (90828) OFFICE/OUTPATIENT VISIT EST

Diagnosis: HYPERTENSION[ICD9: 401.9]

Diagnosis: MALAISE AND FATIGUE[ICD9: 780.79]

Diagnosis: SINUSITIS, ACUTE[ICD9: 461.9] Akanksha DRIVER [facility]            CPT-4: 47005              2014

 

                                        (58243) OFFICE/OUTPATIENT VISIT EST

Diagnosis: HYPERLIPIDEMIA NEC/NOS[ICD9: 272.4]

Diagnosis: HYPERTENSION[ICD9: 401.9]

Diagnosis: B12 DEFIC ANEMIA NEC[ICD9: 281.1]

Diagnosis: HYPOGLYCEMIA[ICD9: 251.2] Akanksha MEJIA [facility]                       CPT-4: 55790              2014

 

                                        OFFICE/OUTPATIENT VISIT EST

Diagnosis: COUGH[ICD9: 786.2] Bertha DRIVER [facility] 

CPT-4: 69443                            2014

 

                                        OFFICE/OUTPATIENT VISIT EST

Diagnosis: COUGH[ICD9: 786.2]

Diagnosis: URI, ACUTE[ICD9: 465.9] Bertha KEY [facility]                       CPT-4: 83501              10/15/2013

 

                                        (13894) OFFICE/OUTPATIENT VISIT EST

Diagnosis: HYPERTENSION[ICD9: 401.9]

Diagnosis: CEPHALGIA[ICD9: 784.0]

Diagnosis: ANXIETY STATE NOS[ICD9: 300.00]

Diagnosis: FLU VACCINE[ICD9: V04.81] Akanksha MEJIA [facility]                       CPT-4: 78945              2013

 

                                        (56869) OFFICE/OUTPATIENT VISIT EST

Diagnosis: DIZZINESS/VERTIGO[ICD9: 780.4]

Diagnosis: SINUSITIS, ACUTE[ICD9: 461.9]

Diagnosis: Benign positional vertigo[ICD9: 386.11] Akanksha IZQUIERDOMARCY

[facility]         CPT-4: 01741              2013

 

                                        OFFICE/OUTPATIENT VISIT EST

Diagnosis: OTITIS MEDIA NOS[ICD9: 382.9] Akanksha HIGHTOWERLINE SAramis

 

[facility]            CPT-4: 88685              2013

 

                                        OFFICE/OUTPATIENT VISIT EST

Diagnosis: Perforation of ear drum[ICD9: 384.20]

Diagnosis: SINUSITIS, ACUTE[ICD9: 461.9] Akanksha HIGHTOWERLINE SAramis

 

[facility]            CPT-4: 57675              05/10/2013

 

                                        ([id number]) OFFICE/OUTPATIENT VISIT EST

Diagnosis: Mastalgia[ICD9: 611.71] Akanksha CHEATHAMAramis TJ

[facility]                       CPT-4: 97605              2013

 

                                        (63809) OFFICE/OUTPATIENT VISIT EST

Diagnosis: HYPERLIPIDEMIA NEC/NOS[ICD9: 272.4]

Diagnosis: HYPERTENSION[ICD9: 401.9]

Diagnosis: DIZZINESS/VERTIGO[ICD9: 780.4]

Diagnosis: Hyperglycemia[ICD9: 790.29]

Diagnosis: MALAISE AND FATIGUE[ICD9: 780.79] Akanksha Xiangrodo TEIXEIRA SAramis 

[facility]            CPT-4: 61590              2012

 

                                        ([id number]) OFFICE/OUTPATIENT VISIT EST

Diagnosis: EUSTACHIAN TUBE DYSFUNCTION[ICD9: 381.81]

Diagnosis: DIZZINESS/VERTIGO[ICD9: 780.4]

Diagnosis: ABDOMINAL PAIN[ICD9: 789.00]

Diagnosis: GERD[ICD9: 530.81]

Diagnosis: CERUMEN IMPACTION[ICD9: 380.4] [facility]            CPT-4: 92900              2012

 

                                        OFFICE/OUTPATIENT VISIT EST

Diagnosis: ABDOMINAL PAIN[ICD9: 789.00]

Diagnosis: DYSPEPSIA[ICD9: 536.8] Akanksha LANCE [facility]                       CPT-4: 20947              10/23/2012

 

                                        (73108) OFFICE/OUTPATIENT VISIT EST

Diagnosis: ABDOMINAL PAIN[ICD9: 789.00]

Diagnosis: DYSPEPSIA[ICD9: 536.8]

Diagnosis: IBS[ICD9: 564.1] Akanksha DRIVER [facility] CPT

-

4: 20301                                10/10/2012

 

                                        OFFICE/OUTPATIENT VISIT EST

Diagnosis: DIZZINESS/VERTIGO[ICD9: 780.4]

Diagnosis: HYPOGLYCEMIA[ICD9: 251.2] Akanksha MEJIA [facility]                       CPT-4: 26387              2012

 

                                        (05747) OFFICE/OUTPATIENT VISIT EST

Diagnosis: URINARY TRACT INFECTION[ICD9: 599.0] Akanksha DRIVER [facility]            CPT-4: 61946              2012

 

                                        (83523) OFFICE/OUTPATIENT VISIT EST

Diagnosis: HYPERLIPIDEMIA NEC/NOS[ICD9: 272.4]

Diagnosis: HYPERTENSION[ICD9: 401.9]

Diagnosis: MALAISE AND FATIGUE[ICD9: 780.79]

Diagnosis: DIZZINESS/VERTIGO[ICD9: 780.4] [facility]ha [facility]            CPT-4: 65326              2012

 

                                        (71656) OFFICE/OUTPATIENT VISIT EST

Diagnosis: DIZZINESS/VERTIGO[ICD9: 780.4]

Diagnosis: MALAISE AND FATIGUE[ICD9: 780.79]

Diagnosis: HYPERTENSION[ICD9: 401.9] Akanksha MEJIA [facility]                       CPT-4: 66287              2012

 

                                        OFFICE/OUTPATIENT VISIT EST

Diagnosis: LUMB/LUMBOSAC DISC DEGEN[ICD9: 722.52]

Diagnosis: Radiculopathy of leg[ICD9: 724.4]

Diagnosis: SACROILIITIS NEC[ICD9: 720.2]

Diagnosis: SPINAL ENTHESOPATHY[ICD9: 720.1] Akanksha DRIVER [facility]            CPT-4: 96391              2012

 

                                        (78442) OFFICE/OUTPATIENT VISIT EST

Diagnosis: PAIN, LOWER BACK[ICD9: 724.2]

Diagnosis: SPASM OF MUSCLE[ICD9: 728.85]

Diagnosis: SCIATICA[ICD9: 724.3]

Diagnosis: Lumbar degenerative disc disease[ICD9: 722.52] Akanksha DRIVER [facility] CPT-4: 66206              2012

 

                                        (51459) OFFICE/OUTPATIENT VISIT EST

Diagnosis: PAIN IN THORACIC SPINE[ICD9: 724.1]

Diagnosis: SPASM OF MUSCLE[ICD9: 728.85] Akanksha DRIVER [facility]            CPT-4: 15766              2012

 

                                        (78559) OFFICE/OUTPATIENT VISIT EST

Diagnosis: PAIN, LOWER BACK[ICD9: 724.2]

Diagnosis: SPASM OF MUSCLE[ICD9: 728.85]

Diagnosis: Sacroiliac dysfunction[ICD9: 739.4]

Diagnosis: Lumbar degenerative disc disease[ICD9: 722.52] Akanksha DRIVER [facility] CPT-4: 79158              2012

 

                                        OFFICE/OUTPATIENT VISIT EST

Diagnosis: MALAISE AND FATIGUE[ICD9: 780.79]

Diagnosis: HYPOTENSION[ICD9: 458.9]

Diagnosis: CAD[ICD9: 414.00] Akanksha DRIVER [facility] 

CPT-4: 26700                            2012

 

                                        OFFICE/OUTPATIENT VISIT EST

Diagnosis: SINUSITIS, ACUTE[ICD9: 461.9]

Diagnosis: PHARYNGITIS, ACUTE[ICD9: 462]

Diagnosis: CERUMEN IMPACTION[ICD9: 380.4] [facility]ha [facility]            CPT-4: 29284              2011

 

                                        OFFICE/OUTPATIENT VISIT EST

Diagnosis: PAIN IN THORACIC SPINE[ICD9: 724.1]

Diagnosis: GERD[ICD9: 530.81]

Diagnosis: DIZZINESS/VERTIGO[ICD9: 780.4] Akanksha BAUMAN S

. 

[facility]            CPT-4: 41702              2011

 

                OFFICE/OUTPATIENT VISIT EST Akanksha OTOOLE

[facility] CPT-

4: 11915                                2011

 

                    (74057) OFFICE/OUTPATIENT VISIT EST Akanksha CASTILLO SAramis [facility]                       CPT-4: 30240              2011

 

                                        (02253) OFFICE/OUTPATIENT VISIT, EST

Diagnosis: PAIN, LOWER BACK[ICD9: 724.2] Akanksha BAUMAN SAramis

 

[facility]            CPT-4: 25105              2011

 

                    (14949) OFFICE/OUTPATIENT VISIT, EST Akanksha DE LA ROSA S. ORENDER DO

[facility]                       CPT-4: 84751              2011

 

                    (04279) OFFICE/OUTPATIENT VISIT, EST Akanksha DE LA ROSA S. [facility]

[facility]                       CPT-4: 64919              2010

 

                    ([id number]) OFFICE/OUTPATIENT VISIT, EST Akanksha DE LA ROSA S. [facility]

[facility]                       CPT-4: 91947              2010

 

                    (22879) OFFICE/OUTPATIENT VISIT, EST Akanksha DE LA ROSA S. [facility]

[facility]                       CPT-4: 56046              2010

 

                    (29083) OFFICE/OUTPATIENT VISIT, EST Akanksha DE LA ROSA S. [facility]

[facility]                       CPT-4: 72946              2010

 

                    (68527) OFFICE/OUTPATIENT VISIT, EST Akanksha DE LA ROSA S. [facility]

[facility]                       CPT-4: 93403              2010

 

                    (11786) OFFICE/OUTPATIENT VISIT, EST Akanksha DE LA ROSA S. [facility]

[facility]                       CPT-4: 03552              2010







Plan of Care





             Planned Activity Notes        Codes        Status       Date

 

                          Visit Diagnosis Plan: Essential hypertension Discussio

n: Increase metoprolol to 

25mg q AM and 50mg po q pM Recheck 1 week

                                        ICD-9 : 401.9

ICD-10 : I10

                                                    2020

 

                          Visit Diagnosis Plan: Palpitations Discussion: Check C

BC, CMP, TSH

                                        ICD-9 : 785.1

ICD-10 : R00.2

                                                    2020

 

                          Patient Education: metoprolol tartrate- [employer] [id number] 

https://www.[employer]/samplemd/resources/getResource/61/5r730533-1452-9j55-8n

                                        Completed           2020

 

                    Care Plan: X-RAY EXAM NECK SPINE 4/5VWS                     

LOINC : 53957-9

                          Pending                   2020

 

                    Care Plan: X-RAY EXAM THORAC SPINE4/>VW                     

LOINC : 64027-7

                          Pending                   2020

 

                                        Appointment: Akanksha Driver

WPtel:[phone]

                                        [address] [place] [address]                                              LAB             2019

 

                                        Appointment: Akanksha Driver

WPtel:[phone]

                                        [address] [place] [address]                                              INJECTION       10/22/2019

 

             Patient Education: INFLUENZA VACCINE [facility]                           

Completed    10/22/2019

 

                                        Appointment: Akanksha Driver

WPtel:[phone]

                                        [address] [place] [address]                                              LAB             10/11/2019

 

                          Visit Diagnosis Plan: Acute serous otitis media, left 

ear Discussion: instructed

to use flonase and claritin daily for symptom relief. discussed with patient 
that if no improvement in 2 weeks, will need to follow up with ENT for audiology
exam. instructed to call office with any other symptoms such as otalgia, 
dysphagia, fever, etc.

                                        ICD-9 : 381.01

ICD-10 : H65.02

                                                    2019

 

                                        Appointment: Nat Lozoya

                                        [address] [place] [address]                                              ACUTE ILLNESS   2019

 

                          Visit Diagnosis Plan: Other fatigue Discussion: Check 

CBC, TSH

                                        ICD-9 : 780.79

ICD-10 : R53.83

                                                    06/10/2019

 

                          Visit Diagnosis Plan: Hypoglycemia, unspecified Discus

madeleine: Monitor BS At least 

6 small meals a day each with protein

                                        ICD-9 : 251.2

ICD-10 : E16.2

                                                    06/10/2019

 

                          Visit Diagnosis Plan: Essential (primary) hypertension

 Discussion: Stable Check 

CMP

                                        ICD-9 : 401.9

ICD-10 : I10

                                                    06/10/2019

 

                          Visit Diagnosis Plan: Urinary tract infection, site no

t specified Discussion: 

Started an old abx prescription

                                        ICD-9 : 599.0

ICD-10 : N39.0

                                                    06/10/2019

 

                                        Appointment: Akanksha Driver

WPtel:[phone]

                                        [address] [place], [place] [id number]

[place]                                              FOLLOW UP       06/10/2019

 

                          Visit Diagnosis Plan: Urinary tract infection, site no

t specified Discussion: 

Macrobid 100mg po BID for 1 week

                                        ICD-9 : 599.0

ICD-10 : N39.0

                                                    2019

 

                          Visit Diagnosis Plan: Gastro-esophageal reflux disease

 without esophagitis 

Discussion: Stable on omeprazole

Follow Up: 3 months

                                        ICD-9 : 530.81

ICD-10 : K21.9

                                                    2019

 

                          Visit Diagnosis Plan: Essential (primary) hypertension

 Discussion: Stable Sees 

Dr. Clements this month

                                        ICD-9 : 401.9

ICD-10 : I10

                                                    2019

 

                                        Appointment: Akanksha Driver

WPtel:[phone]

                                        [address] [place], [place] [id number]

[place]                                              FOLLOW UP       2019

 

                          Patient Education: metoprolol tartrate- [employer] [facility] [id number] 

https://www.[employer]/samplemd/resources/getResource/61/713o5n66-2zoh-25az-33

                                        Completed           2019

 

                                        Appointment: Akanksha Driver

WPtel:[phone]

                                        [address] [place], [place] [id number]

[place]                                              CANCELED        02/15/2019

 

                          Visit Diagnosis Plan: Gastro-esophageal reflux disease

 without esophagitis 

Discussion: Continue protonix and carafate Proceed with updated EGD

                                        ICD-9 : 530.81

ICD-10 : K21.9

                                                    2018

 

                          Visit Diagnosis Plan: Epigastric pain Discussion: [facility]

k CT abdomen/pelvis due to

ongoing abdominal pain

                                        ICD-9 : 789.06

ICD-10 : R10.13

                                                    2018

 

                                        Appointment: Akanksha Driver

WPtel:[phone]

                                        [address] [place], [place] [id number]

[place]                                              FOLLOW UP       2018

 

                    Care Plan: CT PELVIS W/O DYE                     LOINC : 361

08-9

                          Pending                   2018

 

                    Care Plan: CT ABDOMEN W/O DYE                     LOINC : 36

103-0

                          Pending                   2018

 

                    Care Plan: Referral Order                     SNOMED-CT : 30

[id number]

                          Pending                   2018

 

                                        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

art disease of native coronary artery 

without angina pectoris                 Discussion: S/P cardiac cath--doing medi

vicki management 

with imdur and metoprolol increased Has fwup with cardiology next week Discussed
cardiac rehab

                                        ICD-9 : 414.00

ICD-10 : I25.10

                                                    2018

 

                          Visit Diagnosis Plan: Gastro-esophageal reflux disease

 without esophagitis 

Discussion: Continue protonix at BID dosing and carafate BID

Follow Up: 2 months

                                        ICD-9 : 530.81

ICD-10 : K21.9

                                                    2018

 

                          Visit Diagnosis Plan: Essential (primary) hypertension

 Discussion: Stable

                                        ICD-9 : 401.9

ICD-10 : I10

                                                    2018

 

                          Visit Diagnosis Plan: Generalized anxiety disorder Dis

cussion: Stable

                                        ICD-9 : 300.00

ICD-10 : F41.1

                                                    2018

 

                                        Appointment: Akanksha Driver

WPtel:[phone] [address] [id number]

                                              FOLLOW UP       2018

 

                          Visit Diagnosis Plan: Gastro-esophageal reflux disease

 without esophagitis 

Discussion: Continue protonix at BID dosing Continue carafate at q AC and HS 
dosing for 2 more weeks then decrease to BID dosing

Follow Up: 4 weeks

                                        ICD-9 : 530.81

ICD-10 : K21.9

                                                    10/02/2018

 

                          Visit Diagnosis Plan: Generalized anxiety disorder Dis

cussion: Increase xanax to

BID dosing especially with upcoming cardiac testing which is already making 
patient more anxious and worried

                                        ICD-9 : 300.00

ICD-10 : F41.1

                                                    10/02/2018

 

                          Visit Diagnosis Plan: Palpitations Discussion: Cardilo

logy going to schedule 

Lexiscan

                                        ICD-9 : 785.1

ICD-10 : R00.2

                                                    10/02/2018

 

                          Visit Diagnosis Plan: Urinary tract infection, site no

t specified Discussion: 

Reculture urine

                                        ICD-9 : 599.0

ICD-10 : N39.0

                                                    10/02/2018

 

                                        Appointment: Akanksha Driver

WPtel:[phone]

                                        [address] [place], [place] [id number]

                                              FOLLOW UP       10/02/2018

 

             Patient Education: Patient Medication Summary                      

     Completed    10/02/2018

 

                                        Appointment: Akanksha Driver

WPtel:[phone]

                                        [address] [place], [place] [id number]

                                              LAB             2018

 

             Patient Education: Patient Medication Summary                      

     Completed    2018

 

                          Visit Diagnosis Plan: Generalized anxiety disorder Dis

cussion: Did discuss 

increased risk of benzodiazepines causing dementia but at this time will stay at
xanax 0.25mg po BID

                                        ICD-9 : 300.00

ICD-10 : F41.1

                                                    2018

 

                          Visit Diagnosis Plan: Epigastric pain Discussion: Cont

inue protonix and carafate

but make into a slurry Flu shot given Discussed EGD if symptoms persist

Follow Up: 2 weeks

                                        ICD-9 : 789.06

ICD-10 : R10.13

                                                    2018

 

                                        Appointment: Akanksha Driver

WPtel:[phone]

                                        [address] [place] [address]                                              FOLLOW UP       2018

 

             Patient Education: Patient Medication Summary                      

     Completed    2018

 

                          Visit Diagnosis Plan: Gastro-esophageal reflux disease

 without esophagitis 

Discussion: Change to protonix 40mg po BID

Follow Up: 1 months

                                        ICD-9 : 530.81

ICD-10 : K21.9

                                                    2018

 

                          Visit Diagnosis Plan: Essential (primary) hypertension

 Discussion: Has 

hydralazine to use prn per cardiology Going to do 30 day holter monitor

                                        ICD-9 : 401.9

ICD-10 : I10

                                                    2018

 

                          Visit Diagnosis Plan: Hypoglycemia, unspecified Discus

madeleine: 6 small meals a 

day--each with protein Increase fluid intake

                                        ICD-9 : 251.2

ICD-10 : E16.2

                                                    2018

 

                                        Appointment: Akanksha Driver

WPtel:[phone]

                                        [address] [place] [address]                                              ACUTE ILLNESS   2018

 

             Patient Education: Patient Medication Summary                      

     Completed    2018

 

                          Visit Diagnosis Plan: Dizziness and giddiness Discussi

on: blood work to be 

completed in office including cmp, cbc, troponin to rule out glucose 
intolerance, infection, dehydration. instructed patient that she needs to see 
her cardiologist sooner than oct and patient requested we contact dr. clements's 
office. will have front office make appt. patient has carotid doppler annually.

                                        ICD-9 : 780.4

ICD-10 : R42

                                                    2018

 

                                        Appointment: Nat Lozoya

                                        [facility] [address]                                              ACUTE ILLNESS   2018

 

             Patient Education: Patient Medication Summary                      

     Completed    2018

 

                          Visit Diagnosis Plan: Cervicalgia Discussion: Complete

 course of PT since 

symptoms improved Continue stretching exercises Notify if symptoms return or 
worsen

                                        ICD-9 : 723.1

ICD-10 : M54.2

                                                    2018

 

                                        Appointment: Akanksha Driver

WPtel:[phone]

                                        [facility] [address]                                              FOLLOW UP       2018

 

             Patient Education: Patient Medication Summary                      

     Completed    2018

 

                          Visit Diagnosis Plan: Vertigo of central origin, bilat

eral Discussion: Discussed

PT for vestibular therapy

                                        ICD-9 : 386.2

ICD-10 : H81.43

                                                    2018

 

                          Visit Diagnosis Plan: Other spondylosis with radiculop

athy, cervical region 

Discussion: Check MRI of cervical spine

                                        ICD-9 : 721.0

ICD-10 : M47.22

                                                    2018

 

                          Visit Diagnosis Plan: Hypoglycemia, unspecified Discus

madeleine: Eat something with 

protein every 2 hrs

                                        ICD-9 : 251.2

ICD-10 : E16.2

                                                    2018

 

                                        Appointment: Akanksha Driver

WPtel:[phone]

                                        [facility] [place] [id number]

                                                              2018

 

             Patient Education: Patient Medication Summary                      

     Completed    2018

 

                    Care Plan: MRI NECK SPINE W/O DYE                     LOINC 

: [id number]

                          Pending                   2018

 

                          Visit Diagnosis Plan: Benign paroxysmal vertigo, bilat

eral Discussion: patient 

has meclizine at home but states it makes her too tired. instructed patient to 
cut in half and take at night. if it doesn't cause too much drowsiness, 
instructed to take bid until feeling better. instructed to rtc wed if no 
improvement or worsening symptoms. instructed patient to increase fluid intake 
as well.

                                        ICD-9 : 386.11

ICD-10 : H81.13

                                                    2018

 

                          Visit Diagnosis Plan: Otalgia, bilateral Discussion: k

enalog 40 mg given to 

patient im. instructed patient to monitor blood sugar at home due to risk of 
increased sugar. instructed patient to rtc on wednesday if no improvement and 
may require antibiotic.

                                        ICD-9 : 388.70

ICD-10 : H92.03

                                                    2018

 

                                        Appointment: Nat Lozoya

                                        [address] [place] [address]                                              ACUTE ILLNESS   2018

 

             Patient Education: Patient Medication Summary                      

     Completed    2018

 

                          Visit Diagnosis Plan: Low back pain Discussion: Stretc

hes Topical aspercreme 

Tylenol 1 po TID

                                        ICD-9 : 724.2

ICD-10 : M54.5

                                                    2018

 

                          Visit Diagnosis Plan: Radiculopathy, lumbosacral regio

n Discussion: As above

                                        ICD-9 : 724.4

ICD-10 : M54.17

                                                    2018

 

                          Visit Diagnosis Plan: Left lower quadrant pain Discuss

ion: Culture urine Notify 

if worsens

                                        ICD-9 : 789.04

ICD-10 : R10.32

                                                    2018

 

                                        Appointment: Akanksha Driver

WPtel:[phone]

                                        [address] [place] [address]                                              ACUTE ILLNESS   2018

 

             Patient Education: Patient Medication Summary                      

     Completed    2018

 

                                        Appointment: Akanksha Driver

WPtel:[phone]

                                        [address] [place] [address]

[place]                                              INJECTION       10/06/2017

 

             Patient Education: Patient Medication Summary                      

     Completed    10/06/2017

 

                                        Appointment: Akanksha Driver

WPtel:[phone]

                                        [address] [place] [address]

[place]                                              LAB             2017

 

             Patient Education: Patient Medication Summary                      

     Completed    2017

 

                          Visit Diagnosis Plan: Other intervertebral disc degene

ration, lumbar region 

Follow Up: 3 months

                                        ICD-9 : 722.52

ICD-10 : M51.36

                                                    2017

 

                          Visit Diagnosis Plan: Pain in thoracic spine Discussio

n: PT has helped Continue 

stretches and walking [facility]ed [facility]

                                        ICD-9 : 724.1

ICD-10 : M54.6

                                                    2017

 

                                        Appointment: Akanksha Driver

WPtel:[phone]

                                        [address] [place] [address]                                              FOLLOW UP       2017

 

             Patient Education: Patient Medication Summary                      

     Completed    2017

 

                          Visit Diagnosis Plan: Left lower quadrant pain Discuss

ion: Had CT scan of 

abdomen/pelvis in 2017 and normal colonoscopy in 2015

                                        ICD-9 : 789.04

ICD-10 : R10.32

                                                    2017

 

                          Visit Diagnosis Plan: Low back pain Discussion: Start 

PT for low back- Seems 

like abdominal pain is radiating from low back

Follow Up: 5 weeks

                                        ICD-9 : 724.2

ICD-10 : M54.5

                                                    2017

 

                                        Appointment: Akanksha Driver

WPtel:[phone]

                                        [address] [place] [address]                                              ACUTE ILLNESS   2017

 

             Patient Education: Patient Medication Summary                      

     Completed    2017

 

                                        Appointment: Akanksha Driver

WPtel:[phone]

                                        [address] [place], [place] [id number]

[place]                                              LAB             2017

 

             Patient Education: Patient Medication Summary                      

     Completed    2017

 

                          Visit Diagnosis Plan: Mixed hyperlipidemia Discussion:

 Check lipids

                                        ICD-9 : 272.4

ICD-10 : E78.2

                                                    2017

 

                          Visit Diagnosis Plan: Mastodynia Discussion: Check Jace

ateral diagnostic 

mammogram with US

                                        ICD-9 : 611.71

ICD-10 : N64.4

                                                    2017

 

                          Visit Diagnosis Plan: Impaired fasting glucose Discuss

ion: Return in AM for CMP,

HbA1C Accuchecks daily Diet/Exercise discussed at length

                                        ICD-9 : 790.29

ICD-10 : R73.01

                                                    2017

 

                          Visit Diagnosis Plan: Other fatigue Discussion: Check 

CBC, TSH

                                        ICD-9 : 780.79

ICD-10 : R53.83

                                                    2017

 

                                        Appointment: Akanksha Driver

WPtel:[phone]

                                        [address] [place], [place] [id number]

[place]              3/ confirmed `sl                 ACUTE ILLNESS   2017

 

             Patient Education: Patient Medication Summary                      

     Completed    2017

 

                    Care Plan: MAMMOGRAM SCREENING                     LOINC : 2

0747-5

                          Pending                   2017

 

                          Visit Diagnosis Plan: Acute upper respiratory infectio

n, unspecified Discussion:

Exam is reassuring Likely viral illness Supportive care reviewed and encouraged 
Follow up PRN

                                        ICD-9 : 465.9

ICD-10 : J06.9

                                                    2017

 

                                        Appointment: Luba Stevenson 

                                        [address] [place] [address]                                              ACUTE ILLNESS   2017

 

             Patient Education: Patient Medication Summary                      

     Completed    2017

 

                          Visit Plan:               Supportive care. Rest, Fluid

s, Tylenol/Motrin prn fever or 

bodyaches. Notify if worsening symptoms.

                                                            2016

 

                                        Appointment: Akanksha Driver

WPtel:[phone]

                                        [address] [place] [address]                                              ACUTE ILLNESS   2016

 

             Patient Education: Patient Medication Summary                      

     Completed    2016

 

                                        Appointment: Akanksha Driver

WPtel:[phone]

                                        [address] [place] [address]                                              INJECTION       10/07/2016

 

             Patient Education: Patient Medication Summary                      

     Completed    10/07/2016

 

                                        Appointment: Akanksha Driver

WPtel:[phone]

                                        [address] [place] [address]                                              LAB             2016

 

             Patient Education: Patient Medication Summary                      

     Completed    2016

 

                          Visit Plan:               Add miralax daily Use daily 

probiotic and metamucil and push fluids

Notify if worsens/persists

                                                            2016

 

                                        Appointment: Akanksha Driver

WPtel:[phone]

                                        [address] [place] [address]              16 confirmed ~sl                 ACUTE ILLNESS   2016

 

             Patient Education: Patient Medication Summary                      

     Completed    2016

 

                          Visit Plan:               No NSAIDs Kidney function di

scussed Discussed hydration Monitor lab

every 4months so will check CMP, HbA1C next month

                                                            2016

 

                                        Appointment: Akanksha Driver

WPtel:[phone]

                                        [address] [place] [address]              03/15 confirmed ~sl                 ACUTE ILLNESS   2016

 

             Patient Education: Patient Medication Summary                      

     Completed    2016

 

                          Visit Plan:               Vestibular exercises Refill 

flonase Strict low Na diet--discussed 

that needs to read labels Rx for walker with chair given due to muscle 
weakness/unsteadiness Has carotids and abdominal aorta and legs checked in 
January Check Chem 7

                                                            2015

 

                                        Appointment: Akanksha Driver

WPtel:[phone]

                                        [address] [place] [address]              12/15/15 appt confirmed cn                 FOLLOW UP       

 

             Patient Education: Patient Medication Summary                      

     Completed    2015

 

             Patient Education: Vertigo                           Completed    1

2015

 

                                        Appointment: Akanksha Driver

WPtel:[phone]

                                        [address] [place] [address]

[place]                                              INJECTION       10/16/2015

 

             Patient Education: Patient Medication Summary                      

     Completed    10/16/2015

 

                                        Appointment: Akanksha Driver

WPtel:[phone]

                                        [address] [place] [address]                                              UA              09/15/2015

 

             Patient Education: Patient Medication Summary                      

     Completed    09/15/2015

 

                                        Referral: Landon De La Torre

WPtel:[phone]

#1 [facility] [address]              Referral                        Completed       2015

 

                                        Appointment: Akanksha Driver

WPtel:[phone]

                                        [address] [place] [address]                                              LAB             09/10/2015

 

             Patient Education: Patient Medication Summary                      

     Completed    09/10/2015

 

                          Visit Plan:               Check CMP, CBC, TSH, Free T4

, B12, Lipids, HbA1C Discussed 6 small 

meals a day each with protein Would likely benefit from antidepressant Update 
colonoscopy

                                                            2015

 

                                        Appointment: Akanksha Driver

WPtel:[phone]

                                        [address] [place], [place] [id number]              9/8/15 confirmed                 ACUTE ILLNESS   2015

 

             Patient Education: Patient Medication Summary                      

     Completed    2015

 

                          Visit Plan:               Cerumen flush with warm wate

r and peroxide - good results Resume 

Nasonex nasal spray daily Recommended Debrox earwax removal drops

                                                            2015

 

                                        Appointment: Bertha Mon

WPtel:[phone]

                                        [address] [place], [place] [id number]                                              ACUTE ILLNESS   2015

 

             Patient Education: Patient Medication Summary                      

     Completed    2015

 

                          Visit Plan:               Continue aspirin daily Add m

eloxicam for 1week Elevate and Ice Call

on 2015

 

                                        Appointment: Akanksha Driver

WPtel:[phone]

                                        [address] [place] [address]                                              ACUTE ILLNESS   2015

 

             Patient Education: Patient Medication Summary                      

     Completed    2015

 

                          Visit Plan:               Been using baclofen at [facility] and has helped some PT for next 

2-4weeks

                                                            2015

 

                                        Appointment: Akanksha Driver

WPtel:[phone]

                                        [address] [place] [address] Follow Up 2015

 

             Patient Education: Patient Medication Summary                      

     Completed    2015

 

                                        Appointment: Akanksha Driver

WPtel:[phone]

                                        [address] [place] [address]                                              LAB             2015

 

                                        Appointment: Akanksha Driver

WPtel:[phone]

                                        [address] [place] [address]              feeling better, weather -                 FOLLOW UP       

 

                                        Referral: Landon De La Torre

WPtel:[phone]

1 [facility]              Referral                        Appointment Requested 2015

 

                          Visit Plan:               Continue exercise and curren

t meds See surgery for removal of right

arm lesion

                                                            2015

 

                                        Appointment: Akanksha Driver

WPtel:[phone]

                                        [address] [place], [place] [id number]                                              FOLLOW UP       2015

 

             Patient Education: Patient Medication Summary                      

     Completed    2015

 

                                        Appointment: Akanksha Driver

WPtel:[phone]

                                        [address] [place], [place] [id number]

US                                              INJECTION       10/17/2014

 

             Patient Education: Patient Medication Summary                      

     Completed    10/17/2014

 

                                        Appointment: Bertha Mon

WPtel:[phone]

                                        [address] [place], [place] [id number]                                              ACUTE ILLNESS   2014

 

             Patient Education: Patient Medication Summary                      

     Completed    2014

 

                          Visit Plan:               Discussed that likely lumbar

 etiology for leg weakness Will change 

amlodopine to low dose dyazide and see if helps legs and inner ear

                                                            2014

 

                                        Appointment: Akanksha Driver

WPtel:[phone]

                                        [address] [place] [address]                                              FOLLOW UP       2014

 

             Patient Education: Patient Medication Summary                      

     Completed    2014

 

                          Visit Plan:               Ceftin and continue claritin

/flonase Decrease amlodopine to 2.5mg q

HS until fwup with Card due to weakness

                                                            2014

 

                                        Appointment: Akanksha Driver

WPtel:[phone]

                                        [address] [place] [address]                                              ACUTE ILLNESS   2014

 

             Patient Education: Patient Medication Summary                      

     Completed    2014

 

                                        Appointment: Akanksha Driver

WPtel:[phone]

                                        [address] [place] [address]                                              LAB             2014

 

             Patient Education: Patient Medication Summary                      

     Completed    2014

 

                                        Appointment: Bertha Mon

WPtel:[phone]

                                        [address] [place] [address]                                              ACUTE ILLNESS   2014

 

             Patient Education: Patient Medication Summary                      

     Completed    2014

 

                          Visit Plan:               Supportive care. Rest, Fluid

s, Tylenol prn fever or bodyaches. 

Notify if worsening symptoms. Ceftin

                                                            10/15/2013

 

                                        Appointment: Bertha Mon

WPtel:[phone]

                                        [address] [place] [address]                                              ACUTE ILLNESS   10/15/2013

 

             Patient Education: Patient Medication Summary                      

     Completed    10/15/2013

 

                                        Appointment: Akanksha Driver

WPtel:[phone]

                                        [address] [place] [address]                                              BP CHECK        2013

 

             Patient Education: Patient Medication Summary                      

     Completed    2013

 

                                        Appointment: Akanksha Driver

WPtel:[phone]

                                        [address] [place], [place] [id number]

                                              ER Follow UP    2013

 

             Patient Education: Patient Medication Summary                      

     Completed    2013

 

                          Visit Plan:               Restart flonase BID Use mecl

izine 25mg q HS Vestibular exercises 

Claritin 10mg q AM See ENT if doesn't resolve

                                                            2013

 

                                        Appointment: Akanksha Driver

WPtel:[phone]

                                        [address] [place] [address]                                              ACUTE ILLNESS   2013

 

             Patient Education: Patient Medication Summary                      

     Completed    2013

 

                          Visit Plan:               Will continue to observe

                                                            2013

 

                                        Appointment: Akanksha Driver

WPtel:[phone]

                                        [address] [place] [address]                                              FOLLOW UP       2013

 

             Patient Education: Patient Medication Summary                      

     Completed    2013

 

                          Visit Plan:               ERx for Augmentin 875mg q12 

x 10 days and Floxin otic drops 5 gtts 

AD x7days Sample of Nasonex per pt request Discussed treatment (nasal saline, 
salt water gargles, keeping right ear clean and dry, for worsening go to UC on 
weekend, Tylenol/ibuprofen, etc.) RTC 2 weeks for ear recheck.

                                                            05/10/2013

 

                                        Appointment: Jill Jean 

WPtel:[phone]

                                        [address] [place] [address]                                              ACUTE ILLNESS   05/10/2013

 

             Patient Education: Patient Medication Summary                      

     Completed    05/10/2013

 

                          Visit Plan:               Decrease caffeine intake Marina

ck Bilateral Mammogram with US of left 

breast

                                                            2013

 

                                        Appointment: Akanksha Driver

WPtel:[phone]

                                        [address] [place], [place] [id number]                                              ACUTE ILLNESS   2013

 

             Patient Education: Patient Medication Summary                      

     Completed    2013

 

                                        Appointment: Kate Hall

WPtel:[phone]

                                        [address] [place], [place] [id number]

              appt scheduled                  ACUTE ILLNESS   2013

 

                                        Appointment: Akanksha Driver

WPtel:[phone]

                                        [address] [place], [place] [id number]

US                                              LAB             2012

 

             Patient Education: Patient Medication Summary                      

     Completed    2012

 

                          Visit Plan:               Continue off carafate and se

e how does Continue probiotic Add 

Vestibular exercises and use meclizine prn Continue Nasonex Check fasting lab 
including CMP, Lipids, CBC, TSH, Free T4, HbA1C

                                                            2012

 

                                        Appointment: Akanksha Drivertel:[phone]

                                        [address] [place], [place] [id number]

                                              FOLLOW UP       2012

 

             Patient Education: Patient Medication Summary                      

     Completed    2012

 

                          Visit Plan:               Continue carafate for 4more 

weeks at current dose then decrease to 

BID for 2wks then q HS

                                                            10/23/2012

 

                                        Appointment: Akanksha Driver

WPtel:[phone]

                                        [address] [place], [place] [id number]                                              FOLLOW UP       10/23/2012

 

             Patient Education: Patient Medication Summary                      

     Completed    10/23/2012

 

                          Visit Plan:               Continue omeprazole Add Ellyn

fate 1gm po q AC Add daily probiotic

                                                            10/10/2012

 

                                        Appointment: Akanksha Driver

WPtel:[phone]

                                        [address] [place] [address]                                              ACUTE ILLNESS   10/10/2012

 

             Patient Education: Patient Medication Summary                      

     Completed    10/10/2012

 

                                        Appointment: Akanksha Drivertel:[phone]

                                        [address] [place], [place] [id number]                                              INJECTION       2012

 

             Patient Education: Patient Medication Summary                      

     Completed    2012

 

                          Visit Plan:               Diabetic Diet Accuchecks BID

 alternating times Hold onglyza and 

Januvia for now and continue diet and exercise and weight loss and moniter BS 
Discussed hypoglycemia and snack of peanut butter and crackers with juice or 
milk

                                                            2012

 

                                        Appointment: Akanksha Drivertel:[phone]

                                        [address] [place], [place] [id number]

                                              WORK IN         2012

 

             Patient Education: Patient Medication Summary                      

     Completed    2012

 

                                        Appointment: Akanksha Driver

WPtel:[phone]

                                        [address] [place] [address]                                              UA              2012

 

             Patient Education: Patient Medication Summary                      

     Completed    2012

 

                                        Appointment: Akanksha Drivertel:[phone]

                                        [address] [place] [address]                                              LAB             2012

 

             Patient Education: Patient Medication Summary                      

     Completed    2012

 

                                        Appointment: Akanksha Driver

WPtel:[phone]

                                        [address] [place] [address]                                              ACUTE ILLNESS   2012

 

             Patient Education: Patient Medication Summary                      

     Completed    2012

 

                          Visit Plan:               Injection to Right SI joint 

as above Pt will call in 3 days on pain

Has PT starting in 2wks.

                                                            2012

 

                                        Appointment: Akanksha Driver

WPtel:[phone]

                                        [address] [place] [address]                                              ACUTE ILLNESS   2012

 

             Patient Education: Patient Medication Summary                      

     Completed    2012

 

                          Visit Plan:               OMT done Start PT May need u

pdated MRI if need to consider epidural

Prednisone

                                                            2012

 

                                        Appointment: Akanksha Driver

WPtel:[phone]

                                        [address] [place] [address]                                              OMT             2012

 

             Patient Education: Patient Medication Summary                      

     Completed    2012

 

                          Visit Plan:               Flexeril and Vimovo OMT done

 Daily stretches

                                                            2012

 

                                        Appointment: Akanksha Driver

WPtel:[phone]

                                        [address] [place] [address]                                              ACUTE ILLNESS   2012

 

             Patient Education: Patient Medication Summary                      

     Completed    2012

 

                          Visit Plan:               OMT done Daily stretches Vinod

st heat or biofreeze Right SI joint 

injection Call in 1week Vimovo BID

                                                            2012

 

                                        Appointment: Akanksha Driver

WPtel:[phone]

                                        [address] [place] [address]                                              ACUTE ILLNESS   2012

 

             Patient Education: Patient Medication Summary                      

     Completed    2012

 

                                        Appointment: Akanksha Driver

WPtel:[phone]

                                        [address] [place], [place] [id number]

[place]                                              LAB             2012

 

             Patient Education: Patient Medication Summary                      

     Completed    2012

 

                          Visit Plan:               Decrease amlodopine to 1.25m

g daily Schedule with Cardiology 

Fasting lab in AM

                                                            2012

 

                                        Appointment: Akanksha Drivertel:[phone]

                                        [address] [place], [place] [id number]

                                              ACUTE ILLNESS   2012

 

             Patient Education: Patient Medication Summary                      

     Completed    2012

 

                          Visit Plan:               Saline nasal flushes prn. Ty

lenol/Motrin prn headache. Notify if 

persists/symptoms worsening. Cerumen removal from ears as above

                                                            2011

 

                                        Appointment: Akanksha Driver

WPtel:[phone]

                                        [address] [place], [place] [id number]                                              ACUTE ILLNESS   2011

 

             Patient Education: Patient Medication Summary                      

     Completed    2011

 

                                        Appointment: Akanksha Driver

WPtel:[phone]

                                        [address] [place], [place] [id number]

[place]                                              INJECTION       10/05/2011

 

             Patient Education: Patient Medication Summary                      

     Completed    10/05/2011

 

                          Visit Plan:               Continue vimovo for 1more we

ek Increase Omeprazole to BID for 1mo 

then resume QD if stomach improved Vestibular exercises with meclizine q HS for 
next week

                                                            2011

 

                                        Appointment: Akanksha Driver

WPtel:[phone]

                                        [address] [place], [place] [id number]

                                              FOLLOW UP       2011

 

             Patient Education: Patient Medication Summary                      

     Completed    2011

 

                                        Appointment: Akanksha Driver

WPtel:[phone]

                                        [address] [place], [place] [id number]

                                              ACUTE ILLNESS   2011

 

             Patient Education: Patient Medication Summary                      

     Completed    2011

 

                                        Appointment: Akanksha Driver

WPtel:[phone]

                                        [address] [place], [place] [id number]

[place]                                              LAB             2011

 

             Patient Education: Patient Medication Summary                      

     Completed    2011

 

                          Visit Plan:               Saline nasal flushes prn. Ty

lenol/Motrin prn headache. Notify if 

persists/symptoms worsening. Add Veramyst Increase Omeprazole to 20mg po BID

                                                            2011

 

                                        Appointment: Akanksha Driver

WPtel:[phone]

                                        [address] [place] [address]                                              ACUTE ILLNESS   2011

 

             Patient Education: Patient Medication Summary                      

     Completed    2011

 

                                        Appointment: Akanksha Driver

WPtel:[phone]

                                        [address] [place] [address]                                              FOLLOW UP       2011

 

             Patient Education: Patient Medication Summary                      

     Completed    2011

 

                                        Appointment: Akanksha Driver

WPtel:[phone]

                                        [address] [place] [address]                                              ACUTE ILLNESS   2011

 

             Patient Education: Patient Medication Summary                      

     Completed    2011

 

                          Visit Plan:               Nasocourt sample given. Pt. 

will notify if symptoms are worse on 

Monday.

                                                            2010

 

                                        Appointment: Kate Hall

WPtel:[phone]

                                        [address] [place] [address]                                              ACUTE ILLNESS   2010

 

             Patient Education: Patient Medication Summary                      

     Completed    2010

 

                                        Appointment: Akanksha Driver

WPtel:[phone]

                                        [address] [place] [address]

[place]                                              INJECTION       10/06/2010

 

             Patient Education: Patient Medication Summary                      

     Completed    10/06/2010

 

                                        Appointment: Akanksha Driver

WPtel:[phone]

                                        [address] [place] [address]                                              FOLLOW UP       2010

 

             Patient Education: Patient Medication Summary                      

     Completed    2010

 

             Patient Education: Lexapro                           Completed    0

2010

 

                          Visit Plan:               May proceed with hiatal mykel

ia repair per Card. so will contact Dr. Cash's office to proceed with surgery Trial of Lexapro 5mg QD plus use 
xanax prn

                                                            2010

 

                                        Appointment: Akanksha Driver

WPtel:[phone]

                                        [address] [place], [place] [address]

                                              FOLLOW UP       2010

 

             Patient Education: Patient Medication Summary                      

     Completed    2010

 

                          Visit Plan:               B12 given Cont oral B12 and 

iron Fwup 1mo for B12 Proceed with 

hiatal hernia repair once card clearance

                                                            2010

 

                                        Appointment: Akanksha Driver

WPtel:[phone]

                                        [address] [place], [place] [id number]

                                              FOLLOW UP       2010

 

             Patient Education: Patient Medication Summary                      

     Completed    2010

 

                                        Appointment: Akanksha Driver

WPtel:[phone]

                                        [address] [place], [place] [id number]

                                              FOLLOW UP       2010

 

             Patient Education: Patient Medication Summary                      

     Completed    2010

 

                                        Appointment: Akanksha Driver

WPtel:[phone]

                                        [address] [place], [place] [id number]

[place]                                              LAB             2010

 

             Patient Education: Patient Medication Summary                      

     Completed    2010

 

                          Visit Plan:               Check fasting lab in AM--CMP

,Lipids, CBC, Vit D, TSH,FreeT4, B12

                                                            2010

 

                                        Appointment: Akanksha Driver

WPtel:[phone]

                                        [address] [place], [place] [address]                                              FOLLOW UP       2010

 

             Patient Education: Patient Medication Summary                      

     Completed    2010

 

                                        Referral: Landon De La Torre

WPtel:[phone]

#1 [facility]              Referral                        Appointment Requested  

 

                                        Referral: Landon De La Torre

WPtel:[phone]

#[address]              Referral                        Initiated        







Instructions





                                        Comment

 

                                        . Supportive care.  Rest, Fluids, Tyleno

l/Motrin prn fever or bodyaches.  Notify

if worsening symptoms.



 

                                        . Add miralax daily

Use daily probiotic and metamucil and push fluids

Notify if worsens/persists

 

                                        . No NSAIDs

Kidney function discussed

Discussed hydration 

Monitor lab every 4months so will check CMP, HbA1C next month

 

                                        . Vestibular exercises

Refill flonase

Strict low Na diet--discussed that needs to read labels

Rx for walker with chair given due to muscle weakness/unsteadiness

Has carotids and abdominal aorta and legs checked in January

Check Chem 7



 

                                        . Check CMP, CBC, TSH, Free T4, B12, Lip

ids, HbA1C

Discussed 6 small meals a day each with protein

Would likely benefit from antidepressant 

Update colonoscopy

 

                                        . Cerumen flush with warm water and tyler

xide - good results 

Resume Nasonex nasal spray daily

Recommended Debrox earwax removal drops 

 

                                        . Continue aspirin daily

Add meloxicam for 1week

Elevate and Ice

Call on Monday

 

                                        . Been using baclofen at bedtime and has

 helped some

PT for next 2-4weeks



 

                                        . Continue exercise and current meds

See surgery for removal of right arm lesion

 

                                        . Discussed that likely lumbar etiology 

for leg weakness

Will change amlodopine to low dose dyazide and see if helps legs and inner ear

 

                                        . Ceftin and continue claritin/flonase

Decrease amlodopine to 2.5mg q HS until fwup with Card due to weakness

 

                                        .  Supportive care.  Rest, Fluids, Tylen

ol prn fever or bodyaches.  Notify if 

worsening symptoms.

Ceftin

 

                                        . Restart flonase BID

Use meclizine 25mg q HS

Vestibular exercises

Claritin 10mg q AM

See ENT if doesn't resolve

 

                                        . Will continue to observe



 

                                        . ERx for Augmentin 875mg q12 x 10 days 

and Floxin otic drops 5 gtts AD x7days

Sample of Nasonex per pt request

Discussed treatment (nasal saline, salt water gargles, keeping right ear clean 
and dry, for worsening go to UC on weekend, Tylenol/ibuprofen, etc.)

RTC 2 weeks for ear recheck.

 

                                        . Decrease caffeine intake

Check Bilateral Mammogram with US of left breast

 

                                        Left ear flushed with warm water and per

oxide with ear syringe and then last 

removed with currette--peroxide drops instilled.  Tolerated well, no 
complications, TM intact.. Continue off carafate and see how does

Continue probiotic

Add Vestibular exercises and use meclizine prn

Continue Nasonex

Check fasting lab including CMP, Lipids, CBC, TSH, Free T4, HbA1C

 

                                        . Continue carafate for 4more weeks at c

urrent dose then decrease to BID for 

2wks then q HS

 

                                        . Continue omeprazole

Add Carafate 1gm po q AC

Add daily probiotic



 

                                        . Diabetic Diet

Accuchecks BID alternating times

Hold onglyza and Januvia for now and continue diet and exercise and weight loss 
and moniter BS

Discussed hypoglycemia and snack of peanut butter and crackers with juice or 
milk

 

                                        Informed Consent obtainded.  Right SI yasir

int cleansed with alcohol and betadine 

and injected with 3cc 1%l lidocaine with 40mg depomedrol and 40mg kenalog, 
tolerated well with no complications, neosporin and bandage applied. Injection 
to Right SI joint as above

Pt will call in 3 days on pain

Has PT starting in 2wks.

 

                                        . OMT done

Start PT

May need updated MRI if need to consider epidural

Prednisone

 

                                        . Flexeril and Vimovo

OMT done

Daily stretches

 

                                        Right SI joint cleansed with alchohol an

d betadine and injected with 3cc 1% 

lidocaine with 40mg depomedrol and 40mg kenalog, tolerated well with no 
complications, neosporin and bandage applied. OMT done

Daily stretches

Moist heat or biofreeze

Right SI joint injection

Call in 1week

Vimovo BID

 

                                        . Decrease amlodopine to 1.25mg daily

Schedule with Cardiology

Fasting lab in AM

 

                                        .  Saline nasal flushes prn. Tylenol/Mot

rin prn headache.  Notify if 

persists/symptoms worsening.

Cerumen removal from ears as above



 

                                        . Continue vimovo for 1more week

Increase Omeprazole to BID for 1mo then resume QD if stomach improved

Vestibular exercises with meclizine q HS for next week

 

                                        . Saline nasal flushes prn. Tylenol/Motr

in prn headache.  Notify if 

persists/symptoms worsening.

Add Veramyst

Increase Omeprazole to 20mg po BID

 

                                        . Nasocourt sample given.  Pt. will noti

fy if symptoms are worse on Monday. 

 

                                        . May proceed with hiatal hernia repair 

per Card. so will contact Dr. Cash's office to proceed with surgery



Trial of Lexapro 5mg QD plus use xanax prn

 

                                        . B12 given

Cont oral B12 and iron

Fwup 1mo for B12

Proceed with hiatal hernia repair once card clearance

 

                                        . Check fasting lab in AM--CMP,Lipids, C

BC, Vit D, TSH,FreeT4, B12







Medical Equipment

No Medical Equipment data



Health Concerns Section

Health Concerns data not found



Goals Section

Goals data not found



Interventions Section

Interventions data not found



Health Status Evaluations/Outcomes Section

Health Status Evaluations/Outcomes data not found



Advance Directives

No Advance Directive data

## 2020-02-19 NOTE — XMS REPORT
CCD document using C-CDA

                             Created on: 2020



Leilani Ramírez

External Reference #: [id number]

: 1940

Sex: Female



Demographics





                          Address                   [address] [place]  [address]

 

                          Home Phone                [phone]

 

                          Preferred Language        Unknown

 

                          Marital Status            

 

                          Jewish Affiliation     Unknown

 

                          Race                      White

 

                          Ethnic Group              Not  or 





Author





                          Author                    Leilani Driver D.O.

 

                          Organization              [facility]

 

                          Address                   [address] [place]  [address]



 

                          Phone                     [phone]







Care Team Providers





                    Care Team Member Name Role                Phone

 

                    Akanksha Driver D.O. PP                  Unavailable

 

                          CCM                       Unavailable







Summary Purpose

Interface Exchange



Insurance Providers





             Payer name   Policy type / Coverage type Covered party ID Effective

 Begin Date 

Effective End Date

 

             [employer] [place] [id number]  [id number]   

  Unknown

 

             [employer] Senior Supplemental Medicare Part B [id number]   [id number]    

 Unknown







Family history





Father



                          Diagnosis                 Age At Onset

 

                          Heart disease             Unknown







Mother



                          Diagnosis                 Age At Onset

 

                          Heart disease             Unknown

 

                          Cancer                    Unknown







Social History





                Social History Element Codes           Description     Effective

 Dates

 

                Tobacco history SNOMED CT: 516080708 Never smoker    2011

 

                Marital status  Unknown         Single          2010

 

                Number of children Unknown         9               2010







Allergies, Adverse Reactions, Alerts





           Substance  Reaction   Codes      Entered Date Inactivated Date Status

 

           _                     Unknown    2019 No Inactive Date Active

 

           * NO KNOWN FOOD ALLERGIES            Unknown    2010 No Inactiv

e Date Active

 

           _                     Unknown    2010 No Inactive Date Active

 

           QUINIDINE-QUININE ANALOGUES hives,     Unknown    2010 No Inact

diamante Date Active







Problems





                Condition       Codes           Effective Dates Condition Status

 

                          Essential (primary) hypertension ICD-9: 401.9

ICD-10: I10               2014                Active

 

                          Generalized anxiety disorder ICD-9: 300.00

ICD-10: F41.1             2018                Active

 

                          Palpitations              ICD-9: 785.1

ICD-10: R00.2             10/02/2018                Active

 

                          Stress reaction           ICD-9: 308.9

ICD-10: F43.0             2020                Active

 

                          Mixed hyperlipidemia      ICD-9: 272.4

ICD-10: E78.2             2019                Active

 

                          FLU VACCINE               ICD-9: V04.81

ICD-10: Z23               2012                Active

 

                          Acute serous otitis media, left ear ICD-9: 381.01

ICD-10: H65.02            2019                Active

 

                          Coronary atherosclerosis due to calcified coronary les

ion ICD-9: 414.00

ICD-10: I25.84            2018                Active

 

                          Hypoglycemia, unspecified ICD-9: 251.2

ICD-10: E16.2             2017                Active

 

                          Other fatigue             ICD-9: 780.79

ICD-10: R53.83            2017                Active

 

                          Urinary tract infection, site not specified ICD-9: 599

.0

ICD-10: N39.0             10/02/2018                Active

 

                          Gastro-esophageal reflux disease without esophagitis I

CD-9: 530.81

ICD-10: K21.9             2018                Active

 

                          Epigastric pain           ICD-9: 789.06

ICD-10: R10.13            2018                Active

 

                          Atherosclerotic heart disease of native coronary arter

y without angina pectoris 

ICD-9: 414.00

ICD-10: I25.10            2018                Active

 

                          Dizziness and giddiness   ICD-9: 780.4

ICD-10: R42               2014                Active

 

                          Occlusion and stenosis of bilateral carotid arteries I

CD-9: 433.10

ICD-10: I65.23            2018                Active

 

                          Cervicalgia               ICD-9: 723.1

ICD-10: M54.2             2018                Active

 

                          Other spondylosis with radiculopathy, cervical region 

ICD-9: 721.0

ICD-10: M47.22            2018                Active

 

                          Cervicocranial syndrome   ICD-9: 723.2

ICD-10: M53.0             2018                Active

 

                          Vertigo of central origin, bilateral ICD-9: 386.2

ICD-10: H81.43            2018                Active

 

                          Benign paroxysmal vertigo, bilateral ICD-9: 386.11

ICD-10: H81.13            2018                Active

 

                          Otalgia, bilateral        ICD-9: 388.70

ICD-10: H92.03            2018                Active

 

                          Left lower quadrant pain  ICD-9: 789.04

ICD-10: R10.32            2017                Active

 

                          Low back pain             ICD-9: 724.2

ICD-10: M54.5             2017                Active

 

                          Radiculopathy, lumbosacral region ICD-9: 724.4

ICD-10: M54.17            2018                Active

 

                          Impaired fasting glucose  ICD-9: 790.29

ICD-10: R73.01            2012                Active

 

                          Other intervertebral disc degeneration, lumbar region 

ICD-9: 722.52

ICD-10: M51.36            2017                Active

 

                          Pain in thoracic spine    ICD-9: 724.1

ICD-10: M54.6             2017                Active

 

                          Mastodynia                ICD-9: 611.71

ICD-10: N64.4             2017                Active

 

                          Acute upper respiratory infection, unspecified ICD-9: 

465.9

ICD-10: J06.9             2017                Active

 

                          Acute mastoiditis without complications, right ear ICD

-9: 383.00

ICD-10: H70.001           2016                Active

 

                          Constipation, unspecified ICD-9: 564.00

ICD-10: K59.00            2016                Active

 

                          Chronic kidney disease, stage 1 ICD-9: 585.1

ICD-10: N18.1             03/15/2016                Active

 

                          History of falling        ICD-9: V15.88

ICD-10: Z91.81            12/15/2015                Active

 

                          Muscle weakness (generalized) ICD-9: 780.79

ICD-10: M62.81            2015                Active

 

                Acute renal failure ICD-9: 584.9    2015      Active

 

                POLYURIA        ICD-9: 788.42   2015      Active

 

                CAD             ICD-9: 414.00   09/10/2015      Active

 

                Hyperglycemia   ICD-9: 790.29   2012      Active

 

                HYPERLIPIDEMIA NEC/NOS ICD-9: 272.4    09/10/2015      Active

 

                ABDOMINAL PAIN  ICD-9: 789.00   10/10/2012      Active

 

                Grieving        ICD-9: 309.0    2015      Active

 

                HYPOGLYCEMIA    ICD-9: 251.2    2012      Active

 

                MALAISE AND FATIGUE ICD-9: 780.79   2015      Active

 

                CERUMEN IMPACTION ICD-9: 380.4    2015      Active

 

                Leg pain        ICD-9: 729.5    2015      Active

 

                SUPERFIC PHLEBITIS-LEG ICD-9: 451.0    2015      Active

 

                SPASM OF MUSCLE ICD-9: 728.85   2015      Active

 

                Thoracic back pain ICD-9: 724.1    2015      Active

 

                Benign positional vertigo ICD-9: 386.11   2015      Active

 

                Suspicious nevus ICD-9: 238.2    2015      Active

 

                EUSTACHIAN TUBE DYSFUNCTION ICD-9: 381.81   2014      Acti

ve

 

                SINUSITIS, ACUTE ICD-9: 461.9    2014      Active

 

                DIZZINESS/VERTIGO ICD-9: 780.4    2014      Active

 

                HYPERTENSION    ICD-9: 401.9    2014      Active

 

                URI, ACUTE      ICD-9: 465.9    10/15/2013      Active

 

                CEPHALGIA       ICD-9: 784.0    2013      Active

 

                OTITIS MEDIA NOS ICD-9: 382.9    2013      Active

 

                Perforation of ear drum ICD-9: 384.20   05/10/2013      Active

 

                Mastalgia       ICD-9: 611.71   2013      Active

 

                DYSPEPSIA       ICD-9: 536.8    10/10/2012      Active

 

                IBS             ICD-9: 564.1    10/10/2012      Active

 

                FLU VACCINE     ICD-9: V04.81   2012      Active

 

                Radiculopathy of leg ICD-9: 724.4    2012      Active

 

                SCIATICA        ICD-9: 724.3    2012      Active

 

                Lumbar degenerative disc disease ICD-9: 722.52   2012     

 Active

 

                Sacroiliac dysfunction ICD-9: 739.4    2012      Active

 

                Hypertension    Unknown         2012      Active

 

                PAIN, LOWER BACK ICD-9: 724.2    2011      Active

 

                SPINAL ENTHESOPATHY ICD-9: 720.1    2011      Active

 

                Hypotension     ICD-9: 458.9    2011      Active

 

                SACROILIITIS NEC ICD-9: 720.2    2011      Active

 

                PHARYNGITIS, ACUTE ICD-9: 462      2010      Active

 

                URINARY TRACT INFECTION ICD-9: 599.0    2010      Active

 

                Heat exhaustion ICD-9: 992.5    2010      Active

 

                Esophagitis     ICD-9: 530.10   2010      Active

 

                Gastritis       ICD-9: 535.50   2010      Active

 

                GERD            ICD-9: 530.81   2010      Active

 

                Hiatal hernia   ICD-9: 553.3    2010      Active

 

                B12 DEFIC ANEMIA NEC ICD-9: 281.1    2010      Active

 

                Anxiety         Unknown         2010      Active

 

                Heart disease   Unknown         2010      Active

 

                Hyperlipidemia  Unknown         2010      Active

 

                ANXIETY STATE NOS ICD-9: 300.00   2010      Active

 

                DEPRESSIVE DISORDER NEC ICD-9: 311      2010      Active







Medications





          Medication Codes     Instructions Start Date Stop Date Status    Fill 

Instructions

 

                    metoprolol tartrate 25 mg tablet RxNorm: 764821      1 Table

t(s) Oral QAM and two 

tablets (50mg) in the evening--clarification/dose change 2020          

Active                                   

 

                    Flonase Allergy Relief 50 mcg/actuation nasal spray,suspensi

on RxNorm: 5609289     2

Spray Nasal every night at bedtime 2020      Inactive     

    

 

             simvastatin 40 mg tablet RxNorm: 447077 1 Tablet(s) Oral QD 

020   

10/19/2020                Active                     

 

                omeprazole 40 mg capsule,delayed release RxNorm: 040093  1 Capsu

le(s) Oral QD 

2020          10/19/2020          Active               

 

                    isosorbide mononitrate ER 30 mg tablet,extended release 24 h

r RxNorm: 463515      1 

Tablet(s) Oral every night at bedtime 2020      10/20/2020      Active    

       

 

                    metoprolol tartrate 25 mg tablet RxNorm: 955783      1 Table

t(s) Oral QAM and two 

tablets (50mg) in the evening 2020      Inactive         

 

                omeprazole 40 mg capsule,delayed release RxNorm: 568900  1 Capsu

le(s) Oral QD 

2020          Inactive             

 

                    Xanax 0.25 mg tablet RxNorm: 047029      TAKE 1 TABLET BY [facility] TWICE DAILY 1 

Tablet(s) Oral two times a day as needed as needed for anxiety 2020                Inactive                   

 

             simvastatin 40 mg tablet RxNorm: 817186 1 Tablet(s) Oral QD 

020   

2020                Inactive                   

 

                    metoprolol tartrate 25 mg tablet RxNorm: 934174      1 Table

t(s) Oral QAM and two 

tablets (50mg) in the evening 2020      Inactive         

 

                    metoprolol tartrate 25 mg tablet RxNorm: 410329      1 Table

t(s) Oral QAM and two 

tablets (50mg) in the evening 2020      Inactive         

 

                    Flonase Allergy Relief 50 mcg/actuation nasal spray,suspensi

on RxNorm: 9130198     2

Spray Nasal every night at bedtime 2020      Inactive     

    

 

           simvastatin 40 mg tablet RxNorm: 057913 1 Tablet(s) PO QD 2019 

Inactive                                 

 

                omeprazole 40 mg capsule,delayed release RxNorm: 050706  1 Capsu

le(s) Oral QD 

2019          Inactive             

 

                Xanax 0.25 mg tablet RxNorm: 984804  TAKE 1 TABLET BY MOUTH TWIC

E DAILY 

10/29/2019          2020          Inactive             

 

                omeprazole 40 mg capsule,delayed release RxNorm: 821416  1 Capsu

le(s) PO QD 

10/07/2019          2019          Inactive             

 

             metoprolol tartrate 25 mg tablet RxNorm: 186684 1 Tablet(s) PO BID 

2019                Inactive                   

 

                omeprazole 40 mg capsule,delayed release RxNorm: 475945  1 Capsu

le(s) PO QD 

2019          10/06/2019          Inactive             

 

                    Flonase Allergy Relief 50 mcg/actuation nasal spray,suspensi

on RxNorm: 7464239     2

[facility] 2019      Inactive         

 

           simvastatin 40 mg tablet RxNorm: 096243 1 Tablet(s) PO QD 2019 

Inactive                                 

 

           simvastatin 40 mg tablet RxNorm: 212384 1 Tablet(s) PO QD 2019 

Inactive                                 

 

                omeprazole 40 mg capsule,delayed release RxNorm: 466848  1 Capsu

le(s) PO QD 

2019          Inactive             

 

           simvastatin 40 mg tablet RxNorm: 839685 1 Tablet(s) PO QD 2019 

Inactive                                 

 

                omeprazole 40 mg capsule,delayed release RxNorm: 339120  1 Capsu

le(s) PO QD 

2019          Inactive             

 

             Bactrim  mg-160 mg tablet RxNorm: 944545 1 Tablet(s) PO BID 0

3/08/2019   

2019                Inactive                   

 

             Bactrim  mg-160 mg tablet RxNorm: 389540 1 Tablet(s) PO BID 0

3/08/2019   

2019                Inactive                   

 

             Macrobid 100 mg capsule RxNorm: 470850 1 Capsule(s) PO BID 20

                          Inactive                   

 

             metoprolol tartrate 25 mg tablet RxNorm: 062385 1 Tablet(s) PO BID 

2019                Inactive                   

 

                Xanax 0.25 mg tablet RxNorm: 338872  TAKE 1 TABLET BY MOUTH TWIC

E DAILY 

2018          10/28/2019          Inactive             

 

           Xanax 0.25 mg tablet RxNorm: 469907 1 Tablet(s) PO BID 2018 

Inactive                                 

 

                    Protonix 40 mg tablet,delayed release RxNorm: 057138      1 

Tablet(s) PO BID for 

stomach--replaces omeprazole 2018      Inactive         

 

             Carafate 1 gram tablet RxNorm: 598642 1 Tablet(s) PO AC & HS 2019                Inactive                   

 

           Xanax 0.25 mg tablet RxNorm: 883969 1 Tablet(s) PO BID 10/30/2018 12/

10/2018 

Inactive                                 

 

             Bactrim  mg-160 mg tablet RxNorm: 176514 1 Tablet(s) PO BID 1

   

10/08/2018                Inactive                   

 

             Bactrim  mg-160 mg tablet RxNorm: 163905 1 Tablet(s) PO BID 1

   

10/03/2018                Inactive                   

 

             Carafate 1 gram tablet RxNorm: 121970 1 Tablet(s) PO AC & HS 09/18/

2018   

10/17/2018                Inactive                   

 

                    Protonix 40 mg tablet,delayed release RxNorm: 381436      1 

Tablet(s) PO BID for 

stomach--replaces omeprazole 2018      Inactive         

 

                    Protonix 40 mg tablet,delayed release RxNorm: 675376      1 

Tablet(s) PO BID for 

stomach--replaces omeprazole 2018      Inactive         

 

                    fluticasone 50 mcg/actuation nasal spray,suspension RxNorm: 

6934591     2 Spray 

NASAL QD to each nostril 2018      Inactive         

 

             Nasonex 50 mcg/actuation Spray RxNorm: 5731065 2 [facility] 2018                Inactive                   

 

                omeprazole 40 mg capsule,delayed release RxNorm: 258512  1 Capsu

le(s) PO QD 

2018          08/15/2018          Inactive             

 

                    simvastatin 40 mg tablet RxNorm: 531787      1 Tablet(s) PO 

QD TAKE 1 TABLET EVERY 

DAY             2018      Inactive         

 

             metoprolol tartrate 25 mg tablet RxNorm: 325863 1/2 Tablet(s) PO QD

 2018                Inactive                   

 

                simvastatin 40 mg tablet RxNorm: 473195  Tablet(s) TAKE 1 TABLET

 EVERY DAY 

2017          Inactive             

 

             metoprolol tartrate 25 mg tablet RxNorm: 096737 1/2 Tablet(s) PO QD

 2017                Inactive                   

 

                    Flonase 50 mcg/actuation nasal spray,suspension RxNorm: 1797

933     2 [facility] 

BID             2017      Inactive         

 

                omeprazole 40 mg capsule,delayed release RxNorm: 029178  1 Capsu

le(s) PO QD 

2017          Inactive             

 

             metoprolol tartrate 25 mg tablet RxNorm: 943792 1/2 Tablet(s) PO QD

 04/10/2017   

2017                Inactive                   

 

                    ciprofloxacin 0.2 % ear drops in a dropperette RxNorm: 39208

6      4 Drop(s) OTIC TID

for 1 week      2016      Inactive         

 

           cefdinir 300 mg capsule RxNorm: 278160 2 Capsule(s) PO QD 2016 

Inactive                                 

 

                    omeprazole 40 mg capsule,delayed release RxNorm: 715722     

 TAKE 1 CAPSULE EVERY DAY

                2016      Inactive         

 

             simvastatin 40 mg tablet RxNorm: 626626 TAKE 1 TABLET EVERY DAY 2017                Inactive                   

 

                    Flonase 50 mcg/actuation nasal spray,suspension RxNorm: 1797

933     2 [facility] 

BID             2015      Inactive         

 

             cefuroxime axetil 250 mg tablet RxNorm: 015395 1 Tablet(s) PO BID 0

2015                Inactive                   

 

             cefuroxime axetil 250 mg tablet RxNorm: 391166 1 Tablet(s) PO BID 0

2015                Inactive                   

 

                omeprazole 40 mg capsule,delayed release RxNorm: 879370  1 Capsu

le(s) PO QD 

2015          Inactive             

 

           meloxicam 7.5 mg tablet RxNorm: 203918 1 Tablet(s) PO QD 2015 0

2015 

Inactive                                 

 

                loratadine 10 mg tablet RxNorm: 688712  1 Tablet(s) PO QAM for a

llergies 

2014          Inactive             

 

                    Flonase 50 mcg/actuation nasal spray,suspension RxNorm: 8963

23      2 [facility] BID

                2014      12/15/2015      Inactive         

 

           prednisone 20 mg tablet RxNorm: 084673 2 Tablet(s) PO BID 2014 

Inactive                                 

 

             Dyazide 37.5 mg-25 mg capsule RxNorm: 119009 1 Capsule(s) PO QAM 2014                Inactive                   

 

           Ceftin 500 mg tablet RxNorm: 558976 1 Tablet(s) PO BID 2014 

Inactive                                 

 

           Ceftin 500 mg tablet RxNorm: 871700 1 Tablet(s) PO BID 2014 

Inactive                                 

 

                    simvastatin 40 mg tablet RxNorm: 229066      Tablet(s) PO TA

KE ONE TABLET BY MOUTH 

EVERY DAY       2014      Inactive         

 

                    metoprolol tartrate 25 mg tablet RxNorm: 666058      Tablet(

s) PO TAKE ONE-HALF 

TABLET BY MOUTH EVERY DAY 2014      Inactive         

 

           Ceftin 500 mg tablet RxNorm: 198857 1 Tablet(s) PO BID 10/15/2013 10/

 

Inactive                                 

 

                loratadine 10 mg tablet RxNorm: 170449  1 Tablet(s) PO QAM for a

llergies 

2013          Inactive             

 

                    omeprazole 20 mg capsule,delayed release RxNorm: 932393     

 Capsule(s) PO TAKE ONE 

CAPSULE BY MOUTH TWICE DAILY 2013      Inactive         

 

                omeprazole 20 mg capsule,delayed release RxNorm: 030303  1 Capsu

le(s) PO BID 

2013          Inactive             

 

             Augmentin 875 mg-125 mg tablet RxNorm: 301444 1 Tablet(s) PO Q12H 0

5/10/2013   

2013                Inactive                   

 

             Floxin Otic Drops 1 bottle Drops RxNorm:      5 Drop(s) OTIC BID 05

/10/2013   

2013                Inactive                   

 

                    metoprolol tartrate 25 mg tablet RxNorm: 223778      Tablet(

s) PO TAKE ONE-HALF 

TABLET BY MOUTH EVERY DAY 2013      Inactive         

 

                    simvastatin 40 mg tablet RxNorm: 961825      Tablet(s) PO TA

KE ONE TABLET BY MOUTH 

EVERY DAY       2013      Inactive         

 

                lancets         RxNorm:         Misc Miscellaneous USE ONE TO CH

YOGI GLUCOSE EVERY DAY 

2013          Inactive             

 

             Carafate 1 gram tablet RxNorm: 716268 1 Tablet(s) PO AC & HS 2015                Inactive                   

 

           Flagyl 500 mg tablet RxNorm: 778723 1 Tablet(s) PO TID 10/10/2012 10/

 

Inactive                                 

 

             Carafate 1 gram tablet RxNorm: 985085 1 Tablet(s) PO AC & HS 10/10/

2012   

2012                Inactive                   

 

          One Touch Test strips RxNorm:   Miscellaneous QD 2012

 Inactive  

one touch ultra mini test strips

 

           Protonix 40 mg Tab RxNorm: 475435 1 Tablet(s) PO QD 2012 

Inactive                                 

 

           Protonix 40 mg Tab RxNorm: 825219 1 Tablet(s) PO QD 2012 10/09/

2012 

Inactive                                 

 

           prednisone 20 mg Tab RxNorm: 748204 1 Tablet(s) PO BID 2012 

Inactive                                 

 

             Flexeril 5 mg Tab RxNorm: 560061 1 Tablet(s) PO QHS for spasm 2012                Inactive                   

 

             Flexeril 5 mg Tab RxNorm: 070132 1 Tablet(s) PO QHS for spasm 2012                Inactive                   

 

                amlodipine 2.5 mg Tab RxNorm: 344316  1/2 Tablet(s) PO QD replac

es 5mg dose 

2012          Inactive             

 

           simvastatin 40 mg tablet RxNorm: 769824 1 Tablet(s) PO QD 2012 

Inactive                                 

 

             metoprolol tartrate 25 mg tablet RxNorm: 721869 1/2 Tablet(s) PO QD

 2012                Inactive                   

 

                omeprazole 20 mg capsule,delayed release RxNorm: 783990  1 Capsu

le(s) PO BID 

2012          Inactive             

 

           cefdinir 300 mg Cap RxNorm: 603202 2 Capsule(s) PO QD 2011 

Inactive                                 

 

           simvastatin 40 mg Tab RxNorm: 168250 1 Tablet(s) PO QD 2011 

Inactive                                 

 

             metoprolol tartrate 25 mg Tab RxNorm: 932743 1/2 Tablet(s) PO QD 10

/   

2012                Inactive                   

 

             metoprolol tartrate 25 mg Tab RxNorm: 422415 1/2 Tablet(s) PO QD 10

/   

10/24/2011                Inactive                   

 

           meclizine 25 mg Tab RxNorm: 1760902 1 Tablet(s) PO QHS 2011 

Inactive                                for dizziness

 

           Ceftin 500 mg Tab RxNorm: 471852 1 Tablet(s) PO BID 2011 

Inactive                                 

 

                omeprazole 20 mg Cap, Delayed Release RxNorm: 916248  1 Capsule(

s) PO BID 

2011          10/24/2011          Inactive             

 

           simvastatin 40 mg Tab RxNorm: 829790 1 Tablet(s) PO QD 2011 

Inactive                                 

 

           simvastatin 40 mg Tab RxNorm: 932155 1 Tablet(s) PO QD 2011 

Inactive                                 

 

           simvastatin 40 mg Tab RxNorm: 387248 1 Tablet(s) PO QD 2010 

Inactive                                 

 

             Tessalon Perles 100 mg Cap RxNorm: 613049 1 Capsule(s) PO Q6-8H 2010                Inactive                   

 

           cefdinir 300 mg Cap RxNorm: 923120 1 Capsule(s) PO BID 2010 

Inactive                                 

 

           Lexapro 10 mg Tab RxNorm: 550308 1 Tablet(s) PO QD 2010

010 

Inactive                                 

 

           Cipro 250 mg Tab RxNorm: 180715 1 Tablet(s) PO BID 2010 10/04/2

010 

Inactive                                 

 

             Wellbutrin  mg 24 hr Tab RxNorm: 487995 1 Tablet(s) PO QAM 2010                Inactive                   

 

          Fish Oil 1,000 mg Cap RxNorm:   1 Capsule(s) PO QD No Start Date      

     Active     

 

                Tylenol Extra Strength 500 mg tablet RxNorm: 397711  1/2 Tablet(

s) PO as needed 

No Start Date                           Active               

 

                nitroglycerin 0.4 mg sublingual tablet RxNorm: 865853  Tablet(s)

 SL as needed No 

Start Date                              Active               

 

                    Calcium with Vitamin D 600 mg (1,500 mg)-400 unit tablet RxN

orm: 005258      1 

Tablet(s) PO QD No Start Date                   Active           

 

           Multivitamin & Mineral Formula Tab RxNorm:    1 Tablet(s) PO QD No St

art Date            

Active                                   

 

          vitamin B complex capsule RxNorm:   1 Capsule(s) PO QD No Start Date  

         Active     

 

          Aspirin 81 mg Tab RxNorm: 666976 1 Tablet(s) PO QD No Start Date      

     Active     

 

           Vitamin D 1,000 unit Cap RxNorm: 342184 1 Capsule(s) PO QD No Start D

ate            

Active                                   

 

          Co Q-10 oral RxNorm: 03263 oral      No Start Date           Active   

  

 

             metoprolol tartrate 25 mg Tab RxNorm: 189659 1/2 Tablet(s) PO QD No

 Start Date 

10/23/2011                Inactive                   

 

             Nasonex 50 mcg/actuation Spray RxNorm: 9105352 2 Spray NASAL No Sta

rt Date 

2018                Inactive                   

 

           Vitamin B12 1000mcg Tablet RxNorm:    1 Tablet(s) PO QD No Start Date

 2015 

Inactive                                 

 

           amlodipine 5 mg Tab RxNorm: 625834 1 Tablet(s) PO QD No Start Date  

Inactive                                 

 

             simvastatin 40 mg tablet RxNorm: 183905 1 Tablet(s) PO QD No Start 

Date 

2019                Inactive                   

 

                omeprazole 20 mg capsule,delayed release RxNorm: 419526  1 Capsu

le(s) PO BID No 

Start Date          2013          Inactive             

 

                omeprazole 40 mg capsule,delayed release RxNorm: 255328  1 Capsu

le(s) PO QD No 

Start Date          2019          Inactive             

 

           Nexium 40 mg Cap RxNorm: 594590 1 Capsule(s) PO QD No Start Date  

Inactive                                 

 

             Stool Softener 100 mg Tab RxNorm: 7604594 2 Tablet(s) PO BID No Sta

rt Date 

2012                Inactive                   

 

                    Calcium with Vitamin D 600 mg (1,500 mg)-400 unit Tab RxNorm

: 963274      1 Tablet(s)

PO QD           No Start Date   2015      Inactive         

 

             iron 325 mg (65 mg iron) Tab RxNorm: 366493 1 Tablet(s) PO QD No St

art Date 

2015                Inactive                   

 

                Flonase 50 mcg/actuation Nasal Spray RxNorm: 034797  2 [place] ROZ

AL BID No Start 

Date                2014          Inactive             

 

                    fluticasone 50 mcg/actuation nasal spray,suspension RxNorm: 

1157007     2 Spray 

NASAL QD to each nostril No Start Date   2018      Inactive         

 

             Nasonex 50 mcg/actuation Spray RxNorm: 5767313 2 Spray NASAL QD No 

Start Date 

2018                Inactive                   

 

                    hydralazine 50 mg tablet RxNorm: 481158      1 Tablet(s) PO 

as needed for BP over 

160/90          No Start Date   2018      Inactive         

 

             Vimovo 500 mg-20 mg 12 hr Tab RxNorm: 753903 1 Tablet(s) PO BID No 

Start Date 

2011                Inactive                   

 

             Tylenol PM 25 mg-500 mg/15 mL Oral Soln RxNorm: 4446758 1 PO QPM   

  No Start Date 

2015                Inactive                   

 

          Iron (Ferrous Sulfate) Oral RxNorm:   Oral      No Start Date 20

12 Inactive   

 

             Reglan 10 mg Tab RxNorm: 948337 1 Tablet(s) PO TID before meals No 

Start Date 

2011                Inactive                   

 

           Xanax 1 mg Tab RxNorm: 634657 1/2 Tablet(s) PO QD No Start Date  

Inactive                                 

 

             amlodipine 10 mg tablet RxNorm: 173394 1 Tablet(s) PO QD No Start D

ate 

2014                Inactive                   

 

          Iron (dried) Oral RxNorm:   Oral      No Start Date 2012 Inactiv

e   

 

                metoprolol tartrate 50 mg tablet RxNorm: 171734  1 Tablet(s) PO 

BID No Start Date

                    12/10/2018          Inactive             

 

           Xanax 0.25 mg tablet RxNorm: 508465 1 Tablet(s) PO BID No Start Date 

10/29/2018 

Inactive                                 

 

                lancets         RxNorm:         Miscellaneous check blood sugar 

at least once daily No Start 

Date                2013          Inactive             

 

           simvastatin 40 mg Tab RxNorm: 641474 1 Tablet(s) PO QD No Start Date 

2010 

Inactive                                 

 

                    isosorbide mononitrate ER 30 mg tablet,extended release 24 h

r RxNorm: 854978      1 

Tablet(s) PO QHS No Start Date   2019      Inactive         

 

             amlodipine 2.5 mg tablet RxNorm: 975857 1 Tablet(s) PO QD No Start 

Date 

2014                Inactive                   

 

                    isosorbide mononitrate ER 30 mg tablet,extended release 24 h

r RxNorm: 379961      1 

Tablet(s) PO QHS No Start Date   2020      Inactive         

 

             sucralfate 1 gram tablet RxNorm: 812113 1 Tablet(s) PO QID No Start

 Date 

2019                Inactive                   

 

          Fish Oil Oral RxNorm:   Oral      No Start Date 2012 Inactive   

 

          Multiple Vitamin Oral RxNorm:   Oral      No Start Date 2012 [facility]

ctive   

 

                metoprolol tartrate 25 mg tablet RxNorm: 586266  1/2 Tablet(s) P

O QD No Start 

Date                2017          Inactive             

 

                metoprolol tartrate 50 mg tablet RxNorm: 490429  1/2 Tablet(s) P

O BID No Start 

Date                2019          Inactive             

 

                    Flonase Allergy Relief 50 mcg/actuation nasal spray,suspensi

on RxNorm: 9694580     2

[place] NASAL QHS No Start Date   2019      Inactive         







Medication Administered

No Medication Administered data



Immunizations





                Vaccine         Codes           Date            Status

 

                Influenza       CVX: 135        10/22/2019      Complete

 

                Influenza       CVX: 135        10/22/2019      Complete

 

                Influenza       CVX: 135        2018      Complete

 

                Influenza       CVX: 135        10/06/2017      Complete

 

                Influenza       CVX: 135        10/07/2016      Complete

 

                Influenza       CVX: 135        10/16/2015       

 

                Influenza       CVX: 141        2013       

 

                Influenza (Adult) CVX: 141        10/06/2010       







Results





          Observation Observation Code Item      Item Code Result    Date      S

[facility]

 

          COMPLETE BLOOD COUNT [id number]   WBC                 7.1 10e9/L 20

20 Unknown

 

          COMPLETE BLOOD COUNT [id number]   RBC                 4.16 10e12/L 2020 Unknown

 

          COMPLETE BLOOD COUNT [id number]   HEMOGLOBIN           12.4 g/dL 20 Unknown

 

          COMPLETE BLOOD COUNT 0793350   HEMATOCRIT           39.3 %    20

20 Unknown

 

          COMPLETE BLOOD COUNT [id number]   MCV                 94.5 fL   

0 Unknown

 

          COMPLETE BLOOD COUNT [id number]   MCH                 29.8 pg   

0 Unknown

 

          COMPLETE BLOOD COUNT [id number]   MCHC                31.6 g/dL 

0 Unknown

 

          COMPLETE BLOOD COUNT 9397412   PLATELET COUNT           161 10e9/L 2020 Unknown

 

          COMPLETE BLOOD COUNT [id number]   Mean Plt Volume           10.8 fL   2020 Unknown

 

          COMPLETE BLOOD COUNT [id number]   Neut Auto           38.7 %    

0 Unknown

 

          COMPLETE BLOOD COUNT 9311212   Lymph Auto           48.0 %    20 Unknown

 

          COMPLETE BLOOD COUNT [id number]   Mono Auto           9.5 %     

0 Unknown

 

          COMPLETE BLOOD COUNT [id number]   RDW                 13.5 %    

0 Unknown

 

          COMPLETE BLOOD COUNT [id number]   Eos Auto            3.2 %     

0 Unknown

 

          COMPLETE BLOOD COUNT [id number]   Baso Auto           0.6 %     

0 Unknown

 

          COMPLETE BLOOD COUNT [id number]   Neutrophil Abs           2.75 10e9/L  Unknown

 

          COMPLETE BLOOD COUNT [id number]   Lymphocyte Abs           3.41 10e9/L  Unknown

 

          COMPLETE BLOOD COUNT [id number]   Monocyte Abs           0.67 10e9/L 2020 Unknown

 

          COMPLETE BLOOD COUNT [id number]   Eosinophil Abs           0.23 10e9/L  Unknown

 

          COMPLETE BLOOD COUNT [id number]   RDW-SD              44.7 fL   

0 Unknown

 

          COMPLETE BLOOD COUNT [id number]   Basophil Abs           0.04 10e9/L 2020 Unknown

 

          THYROID STIMULATING HORMONE 30811     TSH                 1.677 uIU/mL

 2020 Unknown

 

          COMPREHENSIVE METABOLIC 17997     AST                 19 U/L    2020 Unknown

 

          COMPREHENSIVE METABOLIC 98391     ALT                 12 U/L    2020 Unknown

 

          COMPREHENSIVE METABOLIC 13392     BUN                 17 mg/dL  2020 Unknown

 

          COMPREHENSIVE METABOLIC 84432     ALBUMIN             4.2 g/dL  2020 Unknown

 

          COMPREHENSIVE METABOLIC 50048     CHLORIDE            103 mmol/L  Unknown

 

          COMPREHENSIVE METABOLIC 85151     Bili Total           0.2 mg/dL  Unknown

 

          COMPREHENSIVE METABOLIC 43485     ALK PHOS            61 U/L    2020 Unknown

 

          COMPREHENSIVE METABOLIC 04845     SODIUM              140 mmol/L  Unknown

 

          COMPREHENSIVE METABOLIC 67563     CREATININE           0.95 mg/dL 2020 Unknown

 

          COMPREHENSIVE METABOLIC 52720     CALCIUM             9.4 mg/dL 2020 Unknown

 

          COMPREHENSIVE METABOLIC 45493     POTASSIUM           4.2 mmol/L  Unknown

 

          COMPREHENSIVE METABOLIC 96466     Total Protein           6.5 g/dL   Unknown

 

          COMPREHENSIVE METABOLIC 05729     Glucose             103 mg/dL 2020 Unknown

 

          COMPREHENSIVE METABOLIC 69482     Bicarbonate           26 mmol/L 2020 Unknown

 

          COMPREHENSIVE METABOLIC 40992     AGAP                11 mmol/L 2020 Unknown

 

          GFR CALC  6000622   GFR Non Afr Amr           57 mL/min 2020 Unk

nown

 

          GFR CALC  6025791   GFR Afr Amr           >60 mL/min 2020 Unknow

n

 

          GFR CALC  6822845   GFR Non Afr Amr           52 mL/min 10/11/2019 Unk

nown

 

          GFR CALC  9597486   GFR Afr Amr           >60 mL/min 10/11/2019 Unknow

n

 

          COMPREHENSIVE METABOLIC 06897     AST                 17 U/L    10/11/

2019 Unknown

 

          COMPREHENSIVE METABOLIC 86232     ALT                 11 U/L    10/11/

2019 Unknown

 

          COMPREHENSIVE METABOLIC 54561     BUN                 17 mg/dL  10/11/

2019 Unknown

 

          COMPREHENSIVE METABOLIC 14172     ALBUMIN             3.9 g/dL  10/11/

2019 Unknown

 

          COMPREHENSIVE METABOLIC 30091     CHLORIDE            108 mmol/L 10/11

/2019 Unknown

 

          COMPREHENSIVE METABOLIC 56542     Bili Total           0.5 mg/dL 10/11

/2019 Unknown

 

          COMPREHENSIVE METABOLIC 20688     ALK PHOS            72 U/L    10/11/

2019 Unknown

 

          COMPREHENSIVE METABOLIC 22041     SODIUM              142 mmol/L 10/11

/2019 Unknown

 

          COMPREHENSIVE METABOLIC 05307     CREATININE           1.02 mg/dL 10/1

2019 Unknown

 

          COMPREHENSIVE METABOLIC 21593     CALCIUM             9.3 mg/dL 10/11/

2019 Unknown

 

          COMPREHENSIVE METABOLIC 04479     POTASSIUM           4.0 mmol/L 10/11

/2019 Unknown

 

          COMPREHENSIVE METABOLIC 24978     Total Protein           6.2 g/dL  10

/2019 Unknown

 

          COMPREHENSIVE METABOLIC 13175     Glucose             93 mg/dL  10/11/

2019 Unknown

 

          COMPREHENSIVE METABOLIC 93260     Bicarbonate           27 mmol/L 10/1

2019 Unknown

 

          COMPREHENSIVE METABOLIC 89652     AGAP                7 mmol/L  10/11/

2019 Unknown

 

          GFR CALC  9227785   GFR Non Afr Amr           48 mL/min 06/10/2019 Unk

nown

 

          GFR CALC  3300106   GFR Afr Amr           59 mL/min 06/10/2019 Unknown

 

          THYROID STIMULATING HORMONE 71003     TSH                 1.936 uIU/mL

 06/10/2019 Unknown

 

          COMPREHENSIVE METABOLIC 31001     AST                 18 U/L    06/10/

2019 Unknown

 

          COMPREHENSIVE METABOLIC 09436     ALT                 11 U/L    06/10/

2019 Unknown

 

          COMPREHENSIVE METABOLIC 55236     BUN                 18 mg/dL  06/10/

2019 Unknown

 

          COMPREHENSIVE METABOLIC 71184     ALBUMIN             4.2 g/dL  06/10/

2019 Unknown

 

          COMPREHENSIVE METABOLIC 50761     CHLORIDE            109 mmol/L 06/10

/2019 Unknown

 

          COMPREHENSIVE METABOLIC 40414     Bili Total           0.4 mg/dL 06/10

/2019 Unknown

 

          COMPREHENSIVE METABOLIC 00544     ALK PHOS            64 U/L    06/10/

2019 Unknown

 

          COMPREHENSIVE METABOLIC 53797     SODIUM              142 mmol/L 06/10

/2019 Unknown

 

          COMPREHENSIVE METABOLIC 34171     CREATININE           1.09 mg/dL  Unknown

 

          COMPREHENSIVE METABOLIC 35376     CALCIUM             9.3 mg/dL 06/10/

2019 Unknown

 

          COMPREHENSIVE METABOLIC 80887     POTASSIUM           4.7 mmol/L 06/10

/2019 Unknown

 

          COMPREHENSIVE METABOLIC 81831     Total Protein           6.3 g/dL  06

/10/2019 Unknown

 

          COMPREHENSIVE METABOLIC 32636     Glucose             84 mg/dL  06/10/

2019 Unknown

 

          COMPREHENSIVE METABOLIC 06175     Bicarbonate           25 mmol/L  Unknown

 

          COMPREHENSIVE METABOLIC 17234     AGAP                8 mmol/L  06/10/

2019 Unknown

 

          COMPLETE BLOOD COUNT [id number]   WBC                 7.8 10e9/L 06/10/20

19 Unknown

 

          COMPLETE BLOOD COUNT [id number]   RBC                 4.04 10e12/L 06/10/

2019 Unknown

 

          COMPLETE BLOOD COUNT [id number]   HEMOGLOBIN           12.2 g/dL 06/10/20

19 Unknown

 

          COMPLETE BLOOD COUNT [id number]   HEMATOCRIT           38.6 %    06/10/20

19 Unknown

 

          COMPLETE BLOOD COUNT [id number]   MCV                 95.5 fL   06/10/201

9 Unknown

 

          COMPLETE BLOOD COUNT [id number]   MCH                 30.2 pg   06/10/201

9 Unknown

 

          COMPLETE BLOOD COUNT [id number]   MCHC                31.6 g/dL 06/10/201

9 Unknown

 

          COMPLETE BLOOD COUNT [id number]   PLATELET COUNT           215 10e9/L 06/

10/2019 Unknown

 

          COMPLETE BLOOD COUNT [id number]   Mean Plt Volume           11.2 fL   06/

10/2019 Unknown

 

          COMPLETE BLOOD COUNT [id number]   Neut Auto           45.7 %    06/10/201

9 Unknown

 

          COMPLETE BLOOD COUNT [id number]   Lymph Auto           42.1 %    06/10/20

19 Unknown

 

          COMPLETE BLOOD COUNT [id number]   Mono Auto           9.2 %     06/10/201

9 Unknown

 

          COMPLETE BLOOD COUNT [id number]   RDW                 13.4 %    06/10/201

9 Unknown

 

          COMPLETE BLOOD COUNT [id number]   Eos Auto            2.7 %     06/10/201

9 Unknown

 

          COMPLETE BLOOD COUNT [id number]   Baso Auto           0.3 %     06/10/201

9 Unknown

 

          COMPLETE BLOOD COUNT [id number]   Neutrophil Abs           3.56 10e9/L 06

/10/2019 Unknown

 

          COMPLETE BLOOD COUNT [id number]   Lymphocyte Abs           3.28 10e9/L 06

/10/2019 Unknown

 

          COMPLETE BLOOD COUNT [id number]   Monocyte Abs           0.72 10e9/L  Unknown

 

          COMPLETE BLOOD COUNT [id number]   Eosinophil Abs           0.21 10e9/L 06

/10/2019 Unknown

 

          COMPLETE BLOOD COUNT [id number]   RDW-SD              44.9 fL   06/10/201

9 Unknown

 

          COMPLETE BLOOD COUNT [id number]   Basophil Abs           0.02 10e9/L  Unknown

 

          COMPLETE BLOOD COUNT [id number]   WBC                 5.2 10e9/L 20

18 Unknown

 

          COMPLETE BLOOD COUNT [id number]   RBC                 4.21 10e12/L 2018 Unknown

 

          COMPLETE BLOOD COUNT [id number]   HEMOGLOBIN           12.8 g/dL 20

18 Unknown

 

          COMPLETE BLOOD COUNT [id number]   HEMATOCRIT           39.0 %    20

18 Unknown

 

          COMPLETE BLOOD COUNT [id number]   MCV                 92.6 fL   

8 Unknown

 

          COMPLETE BLOOD COUNT [id number]   MCH                 30.4 pg   

8 Unknown

 

          COMPLETE BLOOD COUNT [id number]   MCHC                32.8 g/dL 

8 Unknown

 

          COMPLETE BLOOD COUNT [id number]   PLATELET COUNT           202 10e9/L  Unknown

 

          COMPLETE BLOOD COUNT [id number]   Mean Plt Volume           10.7 fL    Unknown

 

          COMPLETE BLOOD COUNT [id number]   Neut Auto           40.9 %    

8 Unknown

 

          COMPLETE BLOOD COUNT [id number]   Lymph Auto           46.3 %    20

18 Unknown

 

          COMPLETE BLOOD COUNT [id number]   Mono Auto           9.3 %     

8 Unknown

 

          COMPLETE BLOOD COUNT [id number]   RDW                 13.7 %    

8 Unknown

 

          COMPLETE BLOOD COUNT [id number]   Eos Auto            2.9 %     

8 Unknown

 

          COMPLETE BLOOD COUNT [id number]   Baso Auto           0.6 %     

8 Unknown

 

          COMPLETE BLOOD COUNT [id number]   Neutrophil Abs           2.13 10e9/L  Unknown

 

          COMPLETE BLOOD COUNT [id number]   Lymphocyte Abs           2.41 10e9/L  Unknown

 

          COMPLETE BLOOD COUNT [id number]   Monocyte Abs           0.48 10e9/L  Unknown

 

          COMPLETE BLOOD COUNT [id number]   Eosinophil Abs           0.15 10e9/L  Unknown

 

          COMPLETE BLOOD COUNT [id number]   RDW-SD              45.2 fL   

8 Unknown

 

          COMPLETE BLOOD COUNT [id number]   Basophil Abs           0.03 10e9/L  Unknown

 

          [place] [address]     Glucose             118 mg/dL  Unknown

 

          [place] [address]     CREATININE           1.01 mg/dL  Unknown

 

          [place] [address]     BUN                 14 mg/dL   Unknown

 

          [place] [address]     SODIUM              141 mmol/L  Unknown

 

          [place] [address]     POTASSIUM           4.0 mmol/L  Unknown

 

          [place] [address]     CHLORIDE            108 mmol/L  Unknown

 

          [place] [address]     Bicarbonate           25 mmol/L  Unknown

 

          [place] [address]     AGAP                8 mmol/L   Unknown

 

          [place] [address]     CALCIUM             9.6 mg/dL  Unknown

 

          FREE T4   80595     T4 Free             1.23 ng/dL 2018 Unknown

 

          GFR CALC  0172225   GFR Non Afr Amr           53 mL/min 2018 Unk

nown

 

          GFR CALC  6399572   GFR Afr Amr           >60 mL/min 2018 Unknow

n

 

          THYROID STIMULATING HORMONE 45631     TSH                 2.124 uIU/mL

 2018 Unknown

 

          LIPID GROUP [id number]     Cholesterol           152 mg/dL 2018 Unkno

wn

 

          LIPID GROUP [id number]     Triglyceride           151 mg/dL 2018 Unkn

own

 

          LIPID GROUP [id number]     HDL CHOLESTEROL           47 mg/dL  2018 U

nknown

 

          LIPID GROUP 00497     Chol/HDL Ratio           3.23 ratio 2018 U

nknown

 

          LIPID GROUP 64354     NON-HDL Chol           105 mg/dL 2018 Unkn

own

 

          LIPID GROUP 03707     LDL Cholesterol           75 mg/dL  2018 U

nknown

 

          ASSAY OF TROPONIN QUANT 92047     Troponin-I           <0.30 ng/mL  Unknown

 

          COMPREHENSIVE METABOLIC 77309     AST                 20 U/L    2018 Unknown

 

          COMPREHENSIVE METABOLIC 88957     ALT                 14 U/L    2018 Unknown

 

          COMPREHENSIVE METABOLIC 31979     BUN                 19 mg/dL  2018 Unknown

 

          COMPREHENSIVE METABOLIC 01881     ALBUMIN             4.2 g/dL  2018 Unknown

 

          COMPREHENSIVE METABOLIC 34858     CHLORIDE            102 mmol/L  Unknown

 

          COMPREHENSIVE METABOLIC 24274     Bili Total           0.4 mg/dL  Unknown

 

          COMPREHENSIVE METABOLIC 13341     ALK PHOS            66 U/L    2018 Unknown

 

          COMPREHENSIVE METABOLIC 12518     SODIUM              135 mmol/L  Unknown

 

          COMPREHENSIVE METABOLIC 90800     CREATININE           1.01 mg/dL  Unknown

 

          COMPREHENSIVE METABOLIC 64354     CALCIUM             9.3 mg/dL 2018 Unknown

 

          COMPREHENSIVE METABOLIC 28020     POTASSIUM           4.8 mmol/L  Unknown

 

          COMPREHENSIVE METABOLIC 55434     Total Protein           7.0 g/dL   Unknown

 

          COMPREHENSIVE METABOLIC 38644     Glucose             91 mg/dL  2018 Unknown

 

          COMPREHENSIVE METABOLIC 98816     Bicarbonate           23 mmol/L  Unknown

 

          COMPREHENSIVE METABOLIC 45493     AGAP                10 mmol/L 2018 Unknown

 

          COMPLETE BLOOD COUNT [id number]   WBC                 7.5 10e9/L 20

18 Unknown

 

          COMPLETE BLOOD COUNT [id number]   RBC                 4.13 10e12/L 2018 Unknown

 

          COMPLETE BLOOD COUNT [id number]   HEMOGLOBIN           12.6 g/dL 20

18 Unknown

 

          COMPLETE BLOOD COUNT [id number]   HEMATOCRIT           38.4 %    20

18 Unknown

 

          COMPLETE BLOOD COUNT [id number]   MCV                 93.0 fL   

8 Unknown

 

          COMPLETE BLOOD COUNT [id number]   MCH                 30.5 pg   

8 Unknown

 

          COMPLETE BLOOD COUNT [id number]   MCHC                32.8 g/dL 

8 Unknown

 

          COMPLETE BLOOD COUNT [id number]   PLATELET COUNT           204 10e9/L  Unknown

 

          COMPLETE BLOOD COUNT [id number]   Mean Plt Volume           10.9 fL    Unknown

 

          COMPLETE BLOOD COUNT [id number]   Neut Auto           43.1 %    

8 Unknown

 

          COMPLETE BLOOD COUNT [id number]   Lymph Auto           45.0 %    20

18 Unknown

 

          COMPLETE BLOOD COUNT [id number]   Mono Auto           9.2 %     

8 Unknown

 

          COMPLETE BLOOD COUNT [id number]   RDW                 13.4 %    

8 Unknown

 

          COMPLETE BLOOD COUNT [id number]   Eos Auto            2.3 %     

8 Unknown

 

          COMPLETE BLOOD COUNT [id number]   Baso Auto           0.4 %     

8 Unknown

 

          COMPLETE BLOOD COUNT [id number]   Neutrophil Abs           3.23 10e9/L  Unknown

 

          COMPLETE BLOOD COUNT [id number]   Lymphocyte Abs           3.38 10e9/L  Unknown

 

          COMPLETE BLOOD COUNT [id number]   Monocyte Abs           0.69 10e9/L  Unknown

 

          COMPLETE BLOOD COUNT [id number]   Eosinophil Abs           0.17 10e9/L  Unknown

 

          COMPLETE BLOOD COUNT [id number]   RDW-SD              44.4 fL   

8 Unknown

 

          COMPLETE BLOOD COUNT [id number]   Basophil Abs           0.03 10e9/L  Unknown

 

          GFR CALC  1875414   GFR Non Afr Amr           53 mL/min 2018 Unk

nown

 

          GFR CALC  3831588   GFR Afr Amr           >60 mL/min 2018 Unknow

n

 

          GLYCOSYLATED HEMOGLOBIN TEST 62914     Hgb A1c   84637-8   5.4 %     0

2018 Unknown

 

          MEAN GLUC 7367974   Calc Mean Gluc           108 mg/dL 2018 Unkn

own

 

          MEAN GLUC 6188957   Calc Mean Gluc           114 mg/dL 2017 Unkn

own

 

          LIPID GROUP 97435     Cholesterol           146 mg/dL 2017 Unkno

wn

 

          LIPID GROUP 84309     Triglyceride           119 mg/dL 2017 Unkn

own

 

          LIPID GROUP 30520     HDL CHOLESTEROL           47 mg/dL  2017 U

nknown

 

          LIPID GROUP 56099     Chol/HDL Ratio           3.11 ratio 2017 U

nknown

 

          LIPID GROUP 79553     NON-HDL Chol           99 mg/dL  2017 Unkn

own

 

          LIPID GROUP 68715     LDL Cholesterol           75 mg/dL  2017 U

nknown

 

          GLYCOSYLATED HEMOGLOBIN TEST 55896     Hgb A1c   35108-8   5.6 %     0

2017 Unknown

 

          COMPREHENSIVE METABOLIC 16560     AST                 22 U/L    2017 Unknown

 

          COMPREHENSIVE METABOLIC 18809     ALT                 12 U/L    2017 Unknown

 

          COMPREHENSIVE METABOLIC 30058     BUN                 17 mg/dL  2017 Unknown

 

          COMPREHENSIVE METABOLIC 71219     ALBUMIN             4.0 g/dL  2017 Unknown

 

          COMPREHENSIVE METABOLIC 65302     CHLORIDE            110 mmol/L  Unknown

 

          COMPREHENSIVE METABOLIC 96823     Bili Total           0.4 mg/dL  Unknown

 

          COMPREHENSIVE METABOLIC 22522     ALK PHOS            63 U/L    2017 Unknown

 

          COMPREHENSIVE METABOLIC 70709     SODIUM              140 mmol/L  Unknown

 

          COMPREHENSIVE METABOLIC 54855     CREATININE           1.05 mg/dL  Unknown

 

          COMPREHENSIVE METABOLIC 65191     CALCIUM             9.2 mg/dL 2017 Unknown

 

          COMPREHENSIVE METABOLIC 07698     POTASSIUM           4.2 mmol/L  Unknown

 

          COMPREHENSIVE METABOLIC 09449     Total Protein           6.2 g/dL   Unknown

 

          COMPREHENSIVE METABOLIC 38407     Glucose             87 mg/dL  2017 Unknown

 

          COMPREHENSIVE METABOLIC 80913     Bicarbonate           24 mmol/L  Unknown

 

          COMPREHENSIVE METABOLIC 56916     AGAP                6 mmol/L  2017 Unknown

 

          GFR CALC  2781537   GFR Non Afr Amr           51 mL/min 2017 Unk

nown

 

          GFR CALC  6808130   GFR Afr Amr           >60 mL/min 2017 Unknow

n

 

          COMPLETE BLOOD COUNT [id number]   WBC                 6.7 10e9/L 20

17 Unknown

 

          COMPLETE BLOOD COUNT [id number]   RBC                 4.04 10e12/L 2017 Unknown

 

          COMPLETE BLOOD COUNT [id number]   HEMOGLOBIN           12.1 g/dL 20

17 Unknown

 

          COMPLETE BLOOD COUNT [id number]   HEMATOCRIT           38.0 %    20

17 Unknown

 

          COMPLETE BLOOD COUNT 8862883   MCV                 94.1 fL   

7 Unknown

 

          COMPLETE BLOOD COUNT 6263073   MCH                 30.0 pg   

7 Unknown

 

          COMPLETE BLOOD COUNT [id number]   MCHC                31.8 g/dL 

7 Unknown

 

          COMPLETE BLOOD COUNT [id number]   PLATELET COUNT           206 10e9/L  Unknown

 

          COMPLETE BLOOD COUNT 8608832   Mean Plt Volume           11.3 fL    Unknown

 

          COMPLETE BLOOD COUNT [id number]   Neut Auto           35.8 %    

7 Unknown

 

          COMPLETE BLOOD COUNT 4884503   Lymph Auto           51.6 %    20

17 Unknown

 

          COMPLETE BLOOD COUNT 6659061   Mono Auto           8.8 %     

7 Unknown

 

          COMPLETE BLOOD COUNT [id number]   RDW                 13.5 %    

7 Unknown

 

          COMPLETE BLOOD COUNT [id number]   Eos Auto            3.4 %     

7 Unknown

 

          COMPLETE BLOOD COUNT [id number]   Baso Auto           0.4 %     

7 Unknown

 

          COMPLETE BLOOD COUNT [id number]   Neutrophil Abs           2.40 10e9/L  Unknown

 

          COMPLETE BLOOD COUNT [id number]   Lymphocyte Abs           3.46 10e9/L  Unknown

 

          COMPLETE BLOOD COUNT [id number]   Monocyte Abs           0.59 10e9/L  Unknown

 

          COMPLETE BLOOD COUNT [id number]   Eosinophil Abs           0.23 10e9/L  Unknown

 

          COMPLETE BLOOD COUNT [id number]   RDW-SD              45.3 fL   

7 Unknown

 

          COMPLETE BLOOD COUNT [id number]   Basophil Abs           0.03 10e9/L  Unknown

 

          THYROID STIMULATING HORMONE 39922     TSH                 1.981 uIU/mL

 2017 Unknown

 

          COMPLETE BLOOD COUNT [id number]   WBC                 6.0 10e9/L 20

17 Unknown

 

          COMPLETE BLOOD COUNT [id number]   RBC                 4.29 10e12/L 2017 Unknown

 

          COMPLETE BLOOD COUNT [id number]   HEMOGLOBIN           12.9 g/dL 20

17 Unknown

 

          COMPLETE BLOOD COUNT [id number]   HEMATOCRIT           38.4 %    20

17 Unknown

 

          COMPLETE BLOOD COUNT [id number]   MCV                 89.5 fL   

7 Unknown

 

          COMPLETE BLOOD COUNT [id number]   MCH                 30.1 pg   

7 Unknown

 

          COMPLETE BLOOD COUNT [id number]   MCHC                33.6 g/dL 

7 Unknown

 

          COMPLETE BLOOD COUNT [id number]   PLATELET COUNT           181 10e9/L 2017 Unknown

 

          COMPLETE BLOOD COUNT [id number]   Mean Plt Volume           11.7 fL   2017 Unknown

 

          COMPLETE BLOOD COUNT [id number]   Neut Auto           36.9 %    

7 Unknown

 

          COMPLETE BLOOD COUNT [id number]   Lymph Auto           50.4 %    20

17 Unknown

 

          COMPLETE BLOOD COUNT [id number]   Mono Auto           9.0 %     

7 Unknown

 

          COMPLETE BLOOD COUNT [id number]   RDW                 13.7 %    

7 Unknown

 

          COMPLETE BLOOD COUNT [id number]   Eos Auto            3.4 %     

7 Unknown

 

          COMPLETE BLOOD COUNT [id number]   Baso Auto           0.3 %     

7 Unknown

 

          COMPLETE BLOOD COUNT 7796929   Neutrophil Abs           2.21 10e9/L  Unknown

 

          COMPLETE BLOOD COUNT [id number]   Lymphocyte Abs           3.02 10e9/L  Unknown

 

          COMPLETE BLOOD COUNT [id number]   Monocyte Abs           0.54 10e9/L 2017 Unknown

 

          COMPLETE BLOOD COUNT [id number]   Eosinophil Abs           0.20 10e9/L  Unknown

 

          COMPLETE BLOOD COUNT 2783701   RDW-SD              44.0 fL   

7 Unknown

 

          COMPLETE BLOOD COUNT 8564142   Basophil Abs           0.02 10e9/L 2017 Unknown

 

          GLYCOSYLATED HEMOGLOBIN TEST 15138     Hgb A1c   53842-5   5.4 %     0

3/09/2017 Unknown

 

          THYROID STIMULATING HORMONE 39569     TSH                 2.200 uIU/mL

 2017 Unknown

 

          GFR CALC  0174481   GFR Non Afr Amr           50 mL/min 2017 Unk

nown

 

          GFR CALC  0922861   GFR Afr Amr           >60 mL/min 2017 Unknow

n

 

          MEAN GLUC 8752122   Calc Mean Gluc           108 mg/dL 2017 Unkn

own

 

          COMPREHENSIVE METABOLIC 66504     AST                 18 U/L    2017 Unknown

 

          COMPREHENSIVE METABOLIC 58652     ALT                 10 U/L    2017 Unknown

 

          COMPREHENSIVE METABOLIC 23157     BUN                 20 mg/dL  2017 Unknown

 

          COMPREHENSIVE METABOLIC 19973     ALBUMIN             4.1 g/dL  2017 Unknown

 

          COMPREHENSIVE METABOLIC 32403     CHLORIDE            109 mmol/L  Unknown

 

          COMPREHENSIVE METABOLIC 82244     Bili Total           0.6 mg/dL  Unknown

 

          COMPREHENSIVE METABOLIC 26735     ALK PHOS            64 U/L    2017 Unknown

 

          COMPREHENSIVE METABOLIC 73589     SODIUM              141 mmol/L  Unknown

 

          COMPREHENSIVE METABOLIC 75185     CREATININE           1.06 mg/dL 2017 Unknown

 

          COMPREHENSIVE METABOLIC 88005     CALCIUM             9.9 mg/dL 2017 Unknown

 

          COMPREHENSIVE METABOLIC 54548     POTASSIUM           4.2 mmol/L  Unknown

 

          COMPREHENSIVE METABOLIC 60467     Total Protein           6.3 g/dL   Unknown

 

          COMPREHENSIVE METABOLIC 69042     Glucose             99 mg/dL  2017 Unknown

 

          COMPREHENSIVE METABOLIC 77766     Bicarbonate           21 mmol/L 2017 Unknown

 

          COMPREHENSIVE METABOLIC 65239     AGAP                11 mmol/L 2017 Unknown

 

          LIPID GROUP 80095     Cholesterol           169 mg/dL 2016 Unkno

wn

 

          LIPID GROUP 44816     Triglyceride           165 mg/dL 2016 Unkn

own

 

          LIPID GROUP 41692     HDL CHOLESTEROL           43 mg/dL  2016 U

nknown

 

          LIPID GROUP 69978     Chol/HDL Ratio           3.93 ratio 2016 U

nknown

 

          LIPID GROUP 72695     NON-HDL Chol           126 mg/dL 2016 Unkn

own

 

          LIPID GROUP 02723     LDL Cholesterol           93 mg/dL  2016 U

nknown

 

          COMPREHENSIVE METABOLIC 70749     AST                 18 U/L    2016 Unknown

 

          COMPREHENSIVE METABOLIC 12427     ALT                 10 U/L    2016 Unknown

 

          COMPREHENSIVE METABOLIC 69027     BUN                 20 mg/dL  2016 Unknown

 

          COMPREHENSIVE METABOLIC 07496     ALBUMIN             3.9 g/dL  2016 Unknown

 

          COMPREHENSIVE METABOLIC 72543     CHLORIDE            110 mmol/L  Unknown

 

          COMPREHENSIVE METABOLIC 10867     Bili Total           0.5 mg/dL  Unknown

 

          COMPREHENSIVE METABOLIC 55235     ALK PHOS            72 U/L    2016 Unknown

 

          COMPREHENSIVE METABOLIC 21530     SODIUM              141 mmol/L  Unknown

 

          COMPREHENSIVE METABOLIC 21699     CREATININE           1.12 mg/dL  Unknown

 

          COMPREHENSIVE METABOLIC 34129     CALCIUM             9.7 mg/dL 2016 Unknown

 

          COMPREHENSIVE METABOLIC 57036     POTASSIUM           4.4 mmol/L  Unknown

 

          COMPREHENSIVE METABOLIC 19512     Total Protein           6.2 g/dL   Unknown

 

          COMPREHENSIVE METABOLIC 04297     Glucose             90 mg/dL  2016 Unknown

 

          COMPREHENSIVE METABOLIC 28154     Bicarbonate           23 mmol/L  Unknown

 

          COMPREHENSIVE METABOLIC 04347     AGAP                8 mmol/L  2016 Unknown

 

          GFR CALC  9614737   GFR Non Afr Amr           47 mL/min 2016 Unk

nown

 

          GFR CALC  5755518   GFR Afr Amr           57 mL/min 2016 Unknown

 

          GLYCOSYLATED HEMOGLOBIN TEST 69483     Hgb A1c   01700-7   5.5 %     0

2016 Unknown

 

          THYROID STIMULATING HORMONE 18153     TSH                 2.537 uIU/mL

 2016 Unknown

 

          FREE T4   54665     T4 Free             1.36 ng/dL 2016 Unknown

 

          COMPLETE BLOOD COUNT 9233449   WBC                 6.8 10e9/L 20

16 Unknown

 

          COMPLETE BLOOD COUNT 9689438   RBC                 4.20 10e12/L 2016 Unknown

 

          COMPLETE BLOOD COUNT 7443611   HEMOGLOBIN           12.5 g/dL 20

16 Unknown

 

          COMPLETE BLOOD COUNT 9699136   HEMATOCRIT           38.0 %    20

16 Unknown

 

          COMPLETE BLOOD COUNT 0881418   MCV                 90.5 fL   

6 Unknown

 

          COMPLETE BLOOD COUNT 5087408   MCH                 29.8 pg   

6 Unknown

 

          COMPLETE BLOOD COUNT 3759000   MCHC                32.9 g/dL 

6 Unknown

 

          COMPLETE BLOOD COUNT 1756500   PLATELET COUNT           197 10e9/L  Unknown

 

          COMPLETE BLOOD COUNT 5792750   Mean Plt Volume           11.7 fL    Unknown

 

          COMPLETE BLOOD COUNT 7209433   Neut Auto           41.3 %    

6 Unknown

 

          COMPLETE BLOOD COUNT 7181927   Lymph Auto           47.1 %    20

16 Unknown

 

          COMPLETE BLOOD COUNT 5698513   Mono Auto           7.8 %     

6 Unknown

 

          COMPLETE BLOOD COUNT [id number]   RDW                 13.8 %    

6 Unknown

 

          COMPLETE BLOOD COUNT [id number]   Eos Auto            3.4 %     

6 Unknown

 

          COMPLETE BLOOD COUNT 0604065   Baso Auto           0.4 %     

6 Unknown

 

          COMPLETE BLOOD COUNT [id number]   Neutrophil Abs           2.81 10e9/L  Unknown

 

          COMPLETE BLOOD COUNT 2204550   Lymphocyte Abs           3.20 10e9/L  Unknown

 

          COMPLETE BLOOD COUNT 4906920   Monocyte Abs           0.53 10e9/L  Unknown

 

          COMPLETE BLOOD COUNT 3171900   Eosinophil Abs           0.23 10e9/L  Unknown

 

          COMPLETE BLOOD COUNT 6907288   RDW-SD              44.4 fL   

6 Unknown

 

          COMPLETE BLOOD COUNT [id number]   Basophil Abs           0.03 10e9/L  Unknown

 

          MEAN GLUC 0885535   Calc Mean Gluc           111 mg/dL 2016 Unkn

own

 

          [place] [address]     Glucose             89 MG/DL   Unknown

 

          [place] [address]     CREATININE           1.12 MG/DL  Unknown

 

          [place] [address]     BUN                 20 MG/DL   Unknown

 

          [place] [address]     SODIUM              139 MMOL/L  Unknown

 

          [place] [address]     POTASSIUM           4.6 MMOL/L  Unknown

 

          [place] [address]     CHLORIDE            108 MMOL/L  Unknown

 

          [place] [address]     BICARB              26 MMOL/L  Unknown

 

          [place] [address]     ANION GAP           5 MEQ/L    Unknown

 

          [place] [address]     CALCIUM             10.0 MG/DL  Unknown

 

          GFR CALC  2516278   GFR AA              57.0L ML/MIN 2015 Unknow

n

 

          GFR CALC  6885612   GFR NON-AA           47.0L ML/MIN 2015 Unkno

wn

 

          THYROID STIMULATING HORMONE 08383     TSH                 2.378 uIU/ML

 09/10/2015 Unknown

 

          COMPLETE BLOOD COUNT 6889552   WBC                 6.4 10e9/L 09/10/20

15 Unknown

 

          COMPLETE BLOOD COUNT [id number]   RBC                 3.99 10e12/L 09/10/

2015 Unknown

 

          COMPLETE BLOOD COUNT 3284182   HGB                 11.9 g/dL 09/10/201

5 Unknown

 

          COMPLETE BLOOD COUNT [id number]   HCT DET             36.9 %    09/10/201

5 Unknown

 

          COMPLETE BLOOD COUNT 8763721   MCV                 92.5 fL   09/10/201

5 Unknown

 

          COMPLETE BLOOD COUNT 3094508   MCH                 29.8 pg   09/10/201

5 Unknown

 

          COMPLETE BLOOD COUNT 3311725   MCHC                32.2 g/dL 09/10/201

5 Unknown

 

          COMPLETE BLOOD COUNT [id number]   PLT                 172 10e9/L 09/10/20

15 Unknown

 

          COMPLETE BLOOD COUNT [id number]   MPV                 11.7 fL   09/10/201

5 Unknown

 

          COMPLETE BLOOD COUNT 6451275   ARIK %               40.4 %    09/10/201

5 Unknown

 

          COMPLETE BLOOD COUNT [id number]   LY %                48.0 %    09/10/201

5 Unknown

 

          COMPLETE BLOOD COUNT [id number]   MON %               8.3 %     09/10/201

5 Unknown

 

          COMPLETE BLOOD COUNT [id number]   EOS  %              2.8 %     09/10/201

5 Unknown

 

          COMPLETE BLOOD COUNT [id number]   BASO %              0.5 %     09/10/201

5 Unknown

 

          COMPLETE BLOOD COUNT [id number]   RDW                 13.6 %    09/10/201

5 Unknown

 

          COMPLETE BLOOD COUNT [id number]   ABS ARIK             2.59 10e9/L 09/10/2

015 Unknown

 

          COMPLETE BLOOD COUNT [id number]   ABS LYMPH           3.07 10e9/L 09/10/2

015 Unknown

 

          COMPLETE BLOOD COUNT [id number]   ABS MONO            0.53 10e9/L 09/10/2

015 Unknown

 

          COMPLETE BLOOD COUNT [id number]   ABS EOS             0.18 10e9/L 09/10/2

015 Unknown

 

          COMPLETE BLOOD COUNT [id number]   ABS BASO            0.03 10e9/L 09/10/2

015 Unknown

 

          COMPLETE BLOOD COUNT [id number]   RDW-SD              44.9 fL   09/10/201

5 Unknown

 

          LIPID GROUP 33610     HDL TEST            42 MG/DL  09/10/2015 Unknown

 

          LIPID GROUP 99601     TRIG                177 MG/DL 09/10/2015 Unknown

 

          LIPID GROUP 50790     TEST LDL            72 MG/DL  09/10/2015 Unknown

 

          LIPID GROUP 91732     CHOL                149 MG/DL 09/10/2015 Unknown

 

          LIPID GROUP 20506     RCHOL/HDL           3.55 RATIO 09/10/2015 Unknow

n

 

          LIPID GROUP 22269     NON-HDL CH           107 MG/DL 09/10/2015 Unknow

n

 

          GLYCOSYLATED HEMOGLOBIN TEST 42446     A1C HPLC  27415-2   5.5 %     0

9/10/2015 Unknown

 

          FREE T4   65343     FREE T4             1.39 NG/DL 09/10/2015 Unknown

 

          GFR CALC  0592803   GFR AA              55.0L ML/MIN 09/10/2015 Unknow

n

 

          GFR CALC  9453135   GFR NON-AA           46.0L ML/MIN 09/10/2015 Unkno

wn

 

          COMPREHENSIVE METABOLIC 56291     AST                 17 U/L    09/10/

2015 Unknown

 

          COMPREHENSIVE METABOLIC 60328     ALT                 10 IU/L   09/10/

2015 Unknown

 

          COMPREHENSIVE METABOLIC 95042     BUN                 20 MG/DL  09/10/

2015 Unknown

 

          COMPREHENSIVE METABOLIC 99596     ALBUMIN             3.9 GM/DL 09/10/

2015 Unknown

 

          COMPREHENSIVE METABOLIC 32836     CHLORIDE            111 MMOL/L 09/10

/2015 Unknown

 

          COMPREHENSIVE METABOLIC 25863     BILI TOT            0.4 MG/DL 09/10/

2015 Unknown

 

          COMPREHENSIVE METABOLIC 72446     ALK PHOS            70 U/L    09/10/

2015 Unknown

 

          COMPREHENSIVE METABOLIC 62827     SODIUM              142 MMOL/L 09/10

/2015 Unknown

 

          COMPREHENSIVE METABOLIC 14245     CREATININE           1.16 MG/DL  Unknown

 

          COMPREHENSIVE METABOLIC 18590     CALCIUM             9.4 MG/DL 09/10/

2015 Unknown

 

          COMPREHENSIVE METABOLIC 57119     POTASSIUM           4.6 MMOL/L 09/10

/2015 Unknown

 

          COMPREHENSIVE METABOLIC 18362     PROT TOT            6.2 GM/DL 09/10/

2015 Unknown

 

          COMPREHENSIVE METABOLIC 11968     Glucose             90 MG/DL  09/10/

2015 Unknown

 

          COMPREHENSIVE METABOLIC 70148     BICARB              24 MMOL/L 09/10/

2015 Unknown

 

          COMPREHENSIVE METABOLIC 43443     ANION GAP           7 MEQ/L   09/10/

2015 Unknown

 

          THYROID STIMULATING HORMONE 27998     TSH                 2.427 uIU/ML

 2015 Unknown

 

          LIPID GROUP 97917     HDL TEST            47 MG/DL  2015 Unknown

 

          LIPID GROUP 68995     TRIG                145 MG/DL 2015 Unknown

 

          LIPID GROUP 93081     TEST LDL            73 MG/DL  2015 Unknown

 

          LIPID GROUP 79751     CHOL                149 MG/DL 2015 Unknown

 

          LIPID GROUP 17638     RCHOL/HDL           3.17 RATIO 2015 Unknow

n

 

          LIPID GROUP 04076     NON-HDL CH           102 MG/DL 2015 Unknow

n

 

          COMPREHENSIVE METABOLIC 65059     AST                 17 U/L    2015 Unknown

 

          COMPREHENSIVE METABOLIC 21945     ALT                 9 IU/L    2015 Unknown

 

          COMPREHENSIVE METABOLIC 37107     BUN                 19 MG/DL  2015 Unknown

 

          COMPREHENSIVE METABOLIC 65125     ALBUMIN             4.3 GM/DL 2015 Unknown

 

          COMPREHENSIVE METABOLIC 00637     CHLORIDE            108 MMOL/L  Unknown

 

          COMPREHENSIVE METABOLIC 40967     BILI TOT            0.5 MG/DL 2015 Unknown

 

          COMPREHENSIVE METABOLIC 28707     ALK PHOS            68 U/L    2015 Unknown

 

          COMPREHENSIVE METABOLIC 66505     SODIUM              140 MMOL/L  Unknown

 

          COMPREHENSIVE METABOLIC 74926     CREATININE           1.08 MG/DL 2015 Unknown

 

          COMPREHENSIVE METABOLIC 62186     CALCIUM             9.9 MG/DL 2015 Unknown

 

          COMPREHENSIVE METABOLIC 55787     POTASSIUM           4.3 MMOL/L  Unknown

 

          COMPREHENSIVE METABOLIC 01986     PROT TOT            7.2 GM/DL 2015 Unknown

 

          COMPREHENSIVE METABOLIC 38504     Glucose             94 MG/DL  2015 Unknown

 

          COMPREHENSIVE METABOLIC 28945     BICARB              26 MMOL/L 2015 Unknown

 

          COMPREHENSIVE METABOLIC 09037     ANION GAP           6 MEQ/L   2015 Unknown

 

          GFR CALC  9886597   GFR AA              60.0L ML/MIN 2015 Unknow

n

 

          GFR CALC  1049780   GFR NON-AA           49.0L ML/MIN 2015 Unkno

wn

 

          GLYCOSYLATED HEMOGLOBIN TEST 09215     A1C HPLC  83718-7   5.6 %     0

3/06/2015 Unknown

 

          COMPLETE BLOOD COUNT 2803024   WBC                 7.2 10e9/L 20

15 Unknown

 

          COMPLETE BLOOD COUNT 9149376   RBC                 4.28 10e12/L 2015 Unknown

 

          COMPLETE BLOOD COUNT 2099784   HGB                 12.8 g/dL 

5 Unknown

 

          COMPLETE BLOOD COUNT 1046931   HCT DET             39.3 %    

5 Unknown

 

          COMPLETE BLOOD COUNT 9293529   MCV                 91.8 fL   

5 Unknown

 

          COMPLETE BLOOD COUNT 4438217   MCH                 29.9 pg   

5 Unknown

 

          COMPLETE BLOOD COUNT 2219584   MCHC                32.6 g/dL 

5 Unknown

 

          COMPLETE BLOOD COUNT 8457423   PLT                 189 10e9/L 20

15 Unknown

 

          COMPLETE BLOOD COUNT 3477392   MPV                 11.2 fL   

5 Unknown

 

          COMPLETE BLOOD COUNT 0546681   ARIK %               38.0 %    

5 Unknown

 

          COMPLETE BLOOD COUNT 9964033   LY %                51.0 %    

5 Unknown

 

          COMPLETE BLOOD COUNT 1987231   MON %               7.7 %     

5 Unknown

 

          COMPLETE BLOOD COUNT 0592432   EOS  %              2.9 %     

5 Unknown

 

          COMPLETE BLOOD COUNT 0542379   BASO %              0.4 %     

5 Unknown

 

          COMPLETE BLOOD COUNT 6640079   RDW                 14.0 %    

5 Unknown

 

          COMPLETE BLOOD COUNT 8425799   ABS ARIK             2.74 10e9/L 

015 Unknown

 

          COMPLETE BLOOD COUNT 0853232   ABS LYMPH           3.67 10e9/L 

015 Unknown

 

          COMPLETE BLOOD COUNT 3532578   ABS MONO            0.55 10e9/L 

015 Unknown

 

          COMPLETE BLOOD COUNT 0869623   ABS EOS             0.21 10e9/L 

015 Unknown

 

          COMPLETE BLOOD COUNT 0962449   ABS BASO            0.03 10e9/L 

015 Unknown

 

          COMPLETE BLOOD COUNT 1970257   RDW-SD              46.1 fL   

5 Unknown

 

          FREE T4   95104     FREE T4             1.14 NG/DL 2015 Unknown

 

          GLYCOSYLATED HEMOGLOBIN TEST 64956     A1C HPLC  96990-5   5.2 %     0

2014 Unknown

 

          FREE T4   29662     FREE T4             1.40 NG/DL 2014 Unknown

 

          GFR CALC  8917891   GFR AA              >60 ML/MIN 2014 Unknown

 

          GFR CALC  4311008   GFR NON-AA           52.0L ML/MIN 2014 Unkno

wn

 

          COMPREHENSIVE METABOLIC 94643     AST                 15 U/L    2014 Unknown

 

          COMPREHENSIVE METABOLIC 24916     ALT                 9 IU/L    2014 Unknown

 

          COMPREHENSIVE METABOLIC 87049     BUN                 17 MG/DL  2014 Unknown

 

          COMPREHENSIVE METABOLIC 87374     ALBUMIN             4.0 GM/DL 2014 Unknown

 

          COMPREHENSIVE METABOLIC 61641     CHLORIDE            112 MMOL/L  Unknown

 

          COMPREHENSIVE METABOLIC 94944     BILI TOT            0.5 MG/DL 2014 Unknown

 

          COMPREHENSIVE METABOLIC 33600     ALK PHOS            66 U/L    2014 Unknown

 

          COMPREHENSIVE METABOLIC 32928     SODIUM              140 MMOL/L  Unknown

 

          COMPREHENSIVE METABOLIC 37889     CREATININE           1.03 MG/DL  Unknown

 

          COMPREHENSIVE METABOLIC 08501     CALCIUM             9.5 MG/DL 2014 Unknown

 

          COMPREHENSIVE METABOLIC 11293     POTASSIUM           4.1 MMOL/L  Unknown

 

          COMPREHENSIVE METABOLIC 09065     PROT TOT            6.2 GM/DL 2014 Unknown

 

          COMPREHENSIVE METABOLIC 24804     Glucose             102 MG/DL 2014 Unknown

 

          COMPREHENSIVE METABOLIC 11544     BICARB              23 MMOL/L 2014 Unknown

 

          COMPREHENSIVE METABOLIC 57464     ANION GAP           5 MEQ/L   2014 Unknown

 

          THYROID STIMULATING HORMONE 61933     TSH                 2.074 uIU/ML

 2014 Unknown

 

          VITAMIN B 12 FOLIC ACID 07868|19617 VIT B 12            423 PG/ML  Unknown

 

          VITAMIN B 12 FOLIC ACID 09037|28549 FOLIC ACID           19.7 NG/ML  Unknown

 

          LIPID GROUP [id number]     HDL TEST            40 MG/DL  2014 Unknown

 

          LIPID GROUP [id number]     TRIG                145 MG/DL 2014 Unknown

 

          LIPID GROUP [id number]     TEST LDL            81 MG/DL  2014 Unknown

 

          LIPID GROUP [id number]     CHOL                150 MG/DL 2014 Unknown

 

          LIPID GROUP [id number]     RCHOL/HDL           3.75 RATIO 2014 Unknow

n

 

          COMPLETE BLOOD COUNT [id number]   WBC                 6.0 10e9/L 20

14 Unknown

 

          COMPLETE BLOOD COUNT 8407919   RBC                 4.26 10e12/L 2014 Unknown

 

          COMPLETE BLOOD COUNT 7440061   HGB                 12.7 g/dL 

4 Unknown

 

          COMPLETE BLOOD COUNT [id number]   HCT DET             38.7 %    

4 Unknown

 

          COMPLETE BLOOD COUNT [id number]   MCV                 90.8 fL   

4 Unknown

 

          COMPLETE BLOOD COUNT [id number]   MCH                 29.8 pg   

4 Unknown

 

          COMPLETE BLOOD COUNT [id number]   MCHC                32.8 g/dL 

4 Unknown

 

          COMPLETE BLOOD COUNT [id number]   PLT                 178 10e9/L 20

14 Unknown

 

          COMPLETE BLOOD COUNT [id number]   MPV                 11.7 fL   

4 Unknown

 

          COMPLETE BLOOD COUNT 0945231   ARIK %               30.5 %    

4 Unknown

 

          COMPLETE BLOOD COUNT [id number]   LY %                55.4 %    

4 Unknown

 

          COMPLETE BLOOD COUNT 2293241   MON %               9.0 %     

4 Unknown

 

          COMPLETE BLOOD COUNT [id number]   EOS  %              4.4 %     

4 Unknown

 

          COMPLETE BLOOD COUNT 7459417   BASO %              0.7 %     

4 Unknown

 

          COMPLETE BLOOD COUNT 9716848   RDW                 13.3 %    

4 Unknown

 

          COMPLETE BLOOD COUNT 8028553   ABS ARIK             1.83 10e9/L 

014 Unknown

 

          COMPLETE BLOOD COUNT 8202826   ABS LYMPH           3.32 10e9/L 

014 Unknown

 

          COMPLETE BLOOD COUNT 2432917   ABS MONO            0.54 10e9/L 

014 Unknown

 

          COMPLETE BLOOD COUNT 2717828   ABS EOS             0.26 10e9/L 

014 Unknown

 

          COMPLETE BLOOD COUNT 4290351   ABS BASO            0.04 10e9/L 

014 Unknown

 

          COMPLETE BLOOD COUNT 3439449   RDW-SD              43.2 fL   

4 Unknown

 

          HEMOGLOBIN A1C (GLYCOSYLATED) 0842980   A1C HPLC  30447-2   5.5 %     

2012 Unknown

 

          COMPLETE BLOOD COUNT [id number]   WBC                 6.0 10e9/L 20

12 Unknown

 

          COMPLETE BLOOD COUNT 1174549   RBC                 4.22 10e12/L 2012 Unknown

 

          COMPLETE BLOOD COUNT 7750105   HGB                 12.4 g/dL 

2 Unknown

 

          COMPLETE BLOOD COUNT 3063955   HCT DET             38.2 %    

2 Unknown

 

          COMPLETE BLOOD COUNT 9402663   MCV                 90.5 fL   

2 Unknown

 

          COMPLETE BLOOD COUNT 3161702   MCH                 29.4 pg   

2 Unknown

 

          COMPLETE BLOOD COUNT 3769490   MCHC                32.5 g/dL 

2 Unknown

 

          COMPLETE BLOOD COUNT 7561012   PLT                 187 10e9/L 20

12 Unknown

 

          COMPLETE BLOOD COUNT 5206558   MPV                 11.5 fL   

2 Unknown

 

          COMPLETE BLOOD COUNT 2916165   ARIK %               36.4 %    

2 Unknown

 

          COMPLETE BLOOD COUNT 6059406   LY %                51.0 %    

2 Unknown

 

          COMPLETE BLOOD COUNT 6856947   MON %               8.7 %     

2 Unknown

 

          COMPLETE BLOOD COUNT 7930002   EOS  %              3.2 %     

2 Unknown

 

          COMPLETE BLOOD COUNT 7688802   BASO %              0.7 %     

2 Unknown

 

          COMPLETE BLOOD COUNT 4887521   RDW                 13.7 %    

2 Unknown

 

          COMPLETE BLOOD COUNT 1038361   ABS ARIK             2.18 10e9/L 

012 Unknown

 

          COMPLETE BLOOD COUNT 1917495   ABS LYMPH           3.06 10e9/L 

012 Unknown

 

          COMPLETE BLOOD COUNT 0920204   ABS MONO            0.52 10e9/L 

012 Unknown

 

          COMPLETE BLOOD COUNT 7544879   ABS EOS             0.19 10e9/L 

012 Unknown

 

          COMPLETE BLOOD COUNT 8695638   ABS BASO            0.04 10e9/L 

012 Unknown

 

          COMPLETE BLOOD COUNT 2319549   RDW-SD              44.3 fL   

2 Unknown

 

          LIPID GROUP [id number]     HDL TEST            42 MG/DL  2012 Unknown

 

          LIPID GROUP [id number]     TRIG                156 MG/DL 2012 Unknown

 

          LIPID GROUP [id number]     TEST LDL            80 MG/DL  2012 Unknown

 

          LIPID GROUP [id number]     CHOL                153 MG/DL 2012 Unknown

 

          LIPID GROUP [id number]     RCHOL/HDL           3.64 RATIO 2012 Unknow

n

 

          FREE T4   03400     FREE T4             1.22 NG/DL 2012 Unknown

 

          COMPREHENSIVE METABOLIC 07464     AST                 20 U/L    2012 Unknown

 

          COMPREHENSIVE METABOLIC 65412     ALT                 11 IU/L   2012 Unknown

 

          COMPREHENSIVE METABOLIC 52810     BUN                 19 MG/DL  2012 Unknown

 

          COMPREHENSIVE METABOLIC 97484     ALBUMIN             4.3 GM/DL 2012 Unknown

 

          COMPREHENSIVE METABOLIC 77042     CHLORIDE            109 MMOL/L  Unknown

 

          COMPREHENSIVE METABOLIC 43983     BILI TOT            0.6 MG/DL 2012 Unknown

 

          COMPREHENSIVE METABOLIC 12772     ALK PHOS            84 U/L    2012 Unknown

 

          COMPREHENSIVE METABOLIC 83006     SODIUM              142 MMOL/L  Unknown

 

          COMPREHENSIVE METABOLIC 87956     CREATININE           1.09 MG/DL  Unknown

 

          COMPREHENSIVE METABOLIC 27621     CALCIUM             9.8 MG/DL 2012 Unknown

 

          COMPREHENSIVE METABOLIC 06701     POTASSIUM           4.2 MMOL/L  Unknown

 

          COMPREHENSIVE METABOLIC 88490     PROT TOT            6.4 GM/DL 2012 Unknown

 

          COMPREHENSIVE METABOLIC 99013     Glucose             89 MG/DL  2012 Unknown

 

          COMPREHENSIVE METABOLIC 78804     BICARB              25 MMOL/L 2012 Unknown

 

          COMPREHENSIVE METABOLIC 79305     ANION GAP           8 MEQ/L   2012 Unknown

 

          GFR CALC  2543092   GFR AA              60.0L ML/MIN 2012 Unknow

n

 

          GFR CALC  8409341   GFR NON-AA           49.0L ML/MIN 2012 Unkno

wn

 

          THYROID STIMULATING HORMONE 60547     TSH                 2.450 uIU/ML

 2012 Unknown

 

          COMPREHENSIVE METABOLIC 45889     AST                 22 U/L    2012 Unknown

 

          COMPREHENSIVE METABOLIC 05833     ALT                 14 IU/L   2012 Unknown

 

          COMPREHENSIVE METABOLIC 82709     BUN                 21 MG/DL  2012 Unknown

 

          COMPREHENSIVE METABOLIC 64606     ALBUMIN             4.3 GM/DL 2012 Unknown

 

          COMPREHENSIVE METABOLIC 19783     CHLORIDE            106 MMOL/L  Unknown

 

          COMPREHENSIVE METABOLIC 24922     BILI TOT            0.4 MG/DL 2012 Unknown

 

          COMPREHENSIVE METABOLIC 20132     ALK PHOS            80 U/L    2012 Unknown

 

          COMPREHENSIVE METABOLIC 83445     SODIUM              141 MMOL/L  Unknown

 

          COMPREHENSIVE METABOLIC 64991     CREATININE           1.13 MG/DL  Unknown

 

          COMPREHENSIVE METABOLIC 83128     CALCIUM             9.4 MG/DL 2012 Unknown

 

          COMPREHENSIVE METABOLIC 89693     POTASSIUM           4.3 MMOL/L  Unknown

 

          COMPREHENSIVE METABOLIC 59460     PROT TOT            6.7 GM/DL 2012 Unknown

 

          COMPREHENSIVE METABOLIC 49427     Glucose             98 MG/DL  2012 Unknown

 

          COMPREHENSIVE METABOLIC 37149     BICARB              25 MMOL/L 2012 Unknown

 

          COMPREHENSIVE METABOLIC 58921     ANION GAP           10 MEQ/L  2012 Unknown

 

          LIPID GROUP [id number]     HDL TEST            44 MG/DL  2012 Unknown

 

          LIPID GROUP 70508     TRIG                164 MG/DL 2012 Unknown

 

          LIPID GROUP 67421     TEST LDL            98 MG/DL  2012 Unknown

 

          LIPID GROUP 31827     CHOL                175 MG/DL 2012 Unknown

 

          LIPID GROUP 22818     RCHOL/HDL           3.98 RATIO 2012 Unknow

n

 

          COMPLETE BLOOD COUNT 41915     WBC                 6.7 10e9/L 20

12 Unknown

 

          COMPLETE BLOOD COUNT 71613     RBC                 4.36 10e12/L 2012 Unknown

 

          COMPLETE BLOOD COUNT 08940     HGB                 12.9 g/dL 

2 Unknown

 

          COMPLETE BLOOD COUNT 98773     HCT DET             39.4 %    

2 Unknown

 

          COMPLETE BLOOD COUNT 19672     MCV                 90.4 fL   

2 Unknown

 

          COMPLETE BLOOD COUNT 99347     MCH                 29.6 pg   

2 Unknown

 

          COMPLETE BLOOD COUNT 96851     MCHC                32.7 g/dL 

2 Unknown

 

          COMPLETE BLOOD COUNT 42196     PLT                 184 10e9/L 20

12 Unknown

 

          COMPLETE BLOOD COUNT 44793     MPV                 10.9 fL   

2 Unknown

 

          COMPLETE BLOOD COUNT 28254     ARIK %               41.5 %    

2 Unknown

 

          COMPLETE BLOOD COUNT 61304     LY %                45.7 %    

2 Unknown

 

          COMPLETE BLOOD COUNT 78224     MON %               9.4 %     

2 Unknown

 

          COMPLETE BLOOD COUNT 31498     EOS  %              3.0 %     

2 Unknown

 

          COMPLETE BLOOD COUNT 06116     BASO %              0.4 %     

2 Unknown

 

          COMPLETE BLOOD COUNT 89113     RDW                 13.2 %    

2 Unknown

 

          COMPLETE BLOOD COUNT 60448     ABS ARIK             2.78 10e9/L 

012 Unknown

 

          COMPLETE BLOOD COUNT 15752     ABS LYMPH           3.06 10e9/L 

012 Unknown

 

          COMPLETE BLOOD COUNT 32941     ABS MONO            0.63 10e9/L 

012 Unknown

 

          COMPLETE BLOOD COUNT 33208     ABS EOS             0.20 10e9/L 

012 Unknown

 

          COMPLETE BLOOD COUNT 15712     ABS BASO            0.03 10e9/L 

012 Unknown

 

          COMPLETE BLOOD COUNT 96068     RDW-SD              42.3 fL   

2 Unknown

 

          GFR CALC  5992728   GFR AA              57.0L ML/MIN 2012 Unknow

n

 

          GFR CALC  9268879   GFR NON-AA           47.0L ML/MIN 2012 Unkno

wn

 

          THYROID STIMULATING HORMONE 21802     TSH                 2.663 uIU/ML

 2012 Unknown

 

          FREE T4   20579     FREE T4             1.15 NG/DL 2012 Unknown

 

          THYROID STIMULATING HORMONE 58178     TSH                 1.908 uIU/ML

 2011 Unknown

 

          COMPLETE BLOOD COUNT 42285     WBC                 6.4 10e9/L 20

11 Unknown

 

          COMPLETE BLOOD COUNT 85408     RBC                 3.92 10e12/L 2011 Unknown

 

          COMPLETE BLOOD COUNT 35184     HGB                 11.8 g/dL 

1 Unknown

 

          COMPLETE BLOOD COUNT 67950     HCT DET             36.0 %    

1 Unknown

 

          COMPLETE BLOOD COUNT 55103     MCV                 91.8 fL   

1 Unknown

 

          COMPLETE BLOOD COUNT 21796     MCH                 30.1 pg   

1 Unknown

 

          COMPLETE BLOOD COUNT 93506     MCHC                32.8 g/dL 

1 Unknown

 

          COMPLETE BLOOD COUNT 25878     PLT                 176 10e9/L 20

11 Unknown

 

          COMPLETE BLOOD COUNT 79664     MPV                 11.4 fL   

1 Unknown

 

          COMPLETE BLOOD COUNT 53975     ARIK %               50.4 %    

1 Unknown

 

          COMPLETE BLOOD COUNT 99918     LY %                35.5 %    

1 Unknown

 

          COMPLETE BLOOD COUNT 96034     MON %               10.2 %    

1 Unknown

 

          COMPLETE BLOOD COUNT 74552     EOS  %              3.3 %     

1 Unknown

 

          COMPLETE BLOOD COUNT 81975     BASO %              0.6 %     

1 Unknown

 

          COMPLETE BLOOD COUNT 35988     RDW                 13.7 %    

1 Unknown

 

          COMPLETE BLOOD COUNT 52807     ABS ARIK             3.23 10e9/L 

011 Unknown

 

          COMPLETE BLOOD COUNT 83614     ABS LYMPH           2.27 10e9/L 

011 Unknown

 

          COMPLETE BLOOD COUNT 66436     ABS MONO            0.65 10e9/L 

011 Unknown

 

          COMPLETE BLOOD COUNT 24881     ABS EOS             0.21 10e9/L 

011 Unknown

 

          COMPLETE BLOOD COUNT 44374     ABS BASO            0.04 10e9/L 

011 Unknown

 

          COMPLETE BLOOD COUNT 93505     RDW-SD              45.3 fL   

1 Unknown

 

          GFR CALC  6794726   GFR AA              >60 ML/MIN 2011 Unknown

 

          GFR CALC  1302414   GFR NON-AA           53.0L ML/MIN 2011 Unkno

wn

 

          FREE T4   54636     FREE T4             1.20 NG/DL 2011 Unknown

 

          COMPREHENSIVE METABOLIC 74816     AST                 17 U/L    2011 Unknown

 

          COMPREHENSIVE METABOLIC 81234     ALT                 9 IU/L    2011 Unknown

 

          COMPREHENSIVE METABOLIC 83394     BUN                 16 MG/DL  2011 Unknown

 

          COMPREHENSIVE METABOLIC 18566     ALBUMIN             4.0 GM/DL 2011 Unknown

 

          COMPREHENSIVE METABOLIC 12240     CHLORIDE            108 MMOL/L  Unknown

 

          COMPREHENSIVE METABOLIC 88880     BILI TOT            0.5 MG/DL 2011 Unknown

 

          COMPREHENSIVE METABOLIC 41344     ALK PHOS            76 U/L    2011 Unknown

 

          COMPREHENSIVE METABOLIC 96224     SODIUM              139 MMOL/L  Unknown

 

          COMPREHENSIVE METABOLIC 04473     CREATININE           1.02 MG/DL 2011 Unknown

 

          COMPREHENSIVE METABOLIC 92452     CALCIUM             9.2 MG/DL 2011 Unknown

 

          COMPREHENSIVE METABOLIC 30651     POTASSIUM           4.5 MMOL/L  Unknown

 

          COMPREHENSIVE METABOLIC 27931     PROT TOT            6.1 GM/DL 2011 Unknown

 

          COMPREHENSIVE METABOLIC 63793     Glucose             93 MG/DL  2011 Unknown

 

          COMPREHENSIVE METABOLIC 09319     BICARB              26 MMOL/L 2011 Unknown

 

          COMPREHENSIVE METABOLIC 46108     ANION GAP           5 MEQ/L   2011 Unknown

 

          LIPID GROUP 92922     HDL TEST            46 MG/DL  2011 Unknown

 

          LIPID GROUP 12496     TRIG                102 MG/DL 2011 Unknown

 

          LIPID GROUP 80274     TEST LDL            88 MG/DL  2011 Unknown

 

          LIPID GROUP 03520     CHOL                154 MG/DL 2011 Unknown

 

          LIPID GROUP 24349     RCHOL/HDL           3.35 RATIO 2011 Unknow

n







Procedures





                    Procedure           Codes               Date

 

                    ROUTINE VENIPUNCTURE CPT-4: 59223        2020

 

                    ASSAY THYROID STIM HORMONE CPT-4: 33595        2020

 

                    COMPLETE CBC W/AUTO DIFF WBC CPT-4: 89909        2020

 

                    COMPREHEN METABOLIC PANEL CPT-4: 08049        2020

 

                    ROUTINE VENIPUNCTURE CPT-4: 82817        2019

 

                    LIPID PANEL         CPT-4: 79026        2019

 

                    FLU VACC PRSV FREE INC ANTIG 65 AND OLDER CPT-4: 90809      

  10/22/2019

 

                    FLU VACC PRSV FREE INC ANTIG 65 AND OLDER CPT-4: 37892      

  10/22/2019

 

                    ADMIN INFLUENZA VIRUS VAC CPT-4:         10/22/2019

 

                    COMPREHEN METABOLIC PANEL CPT-4: 96981        10/11/2019

 

                    ROUTINE VENIPUNCTURE CPT-4: 93936        10/11/2019

 

                    ROUTINE VENIPUNCTURE CPT-4: 24321        06/10/2019

 

                    ASSAY THYROID STIM HORMONE CPT-4: 00751        06/10/2019

 

                    COMPREHEN METABOLIC PANEL CPT-4: 29346        06/10/2019

 

                    COMPLETE CBC W/AUTO DIFF WBC CPT-4: 36570        06/10/2019

 

                    URINALYSIS NONAUTO W/O SCOPE CPT-4: 72075        2019

 

                    URINE CULTURE/ COLONY COUNT CPT-4: 84137        2019

 

                    URINE CULTURE/ COLONY COUNT CPT-4: 88234        10/02/2018

 

                    ROUTINE VENIPUNCTURE CPT-4: 04652        2018

 

                    ASSAY OF FREE THYROXINE CPT-4: 82208        2018

 

                    ASSAY THYROID STIM HORMONE CPT-4: 34718        2018

 

                    COMPLETE CBC W/AUTO DIFF WBC CPT-4: 81466        2018

 

                    METABOLIC PANEL TOTAL CA CPT-4: 45764        2018

 

                    FLU VACC PRSV FREE INC ANTIG 65 AND OLDER CPT-4: 61306      

  2018

 

                    ASSAY, GLUCOSE, BLOOD QUANT CPT-4: 97436        2018

 

                    ADMIN INFLUENZA VIRUS VAC CPT-4:         2018

 

                    ROUTINE VENIPUNCTURE CPT-4: 61522        2018

 

                    COMPREHEN METABOLIC PANEL CPT-4: 24861        2018

 

                    COMPLETE CBC W/AUTO DIFF WBC CPT-4: 25498        2018

 

                    A1C HPLC            CPT-4: 29504        2018

 

                    ASSAY OF TROPONIN QUANT CPT-4: 74529        2018

 

                    LIPID PANEL         CPT-4: 72747        2018

 

                    THER/PROPH/DIAG INJ SC/IM CPT-4: 68414        2018

 

                    TRIAMCINOLONE ACET INJ NOS CPT-4:         2018

 

                    URINALYSIS NONAUTO W/O SCOPE CPT-4: 03868        2018

 

                    URINE CULTURE/ COLONY COUNT CPT-4: 51039        2018

 

                    FLU VACC PRSV FREE INC ANTIG 65 AND OLDER CPT-4: 12934      

  10/06/2017

 

                    ADMIN INFLUENZA VIRUS VAC CPT-4:         10/06/2017

 

                    ROUTINE VENIPUNCTURE CPT-4: 68912        2017

 

                    COMPREHEN METABOLIC PANEL CPT-4: 07198        2017

 

                    COMPLETE CBC W/AUTO DIFF WBC CPT-4: 89064        2017

 

                    LIPID PANEL         CPT-4: 07199        2017

 

                    A1C HPLC            CPT-4: 38805        2017

 

                    ASSAY THYROID STIM HORMONE CPT-4: 81541        2017

 

                    ROUTINE VENIPUNCTURE CPT-4: 01241        2017

 

                    ASSAY THYROID STIM HORMONE CPT-4: 99170        2017

 

                    COMPREHEN METABOLIC PANEL CPT-4: 99001        2017

 

                    COMPLETE CBC W/AUTO DIFF WBC CPT-4: 51552        2017

 

                    A1C HPLC            CPT-4: 38900        2017

 

                    FLU VACC PRSV FREE INC ANTIG 65 AND OLDER CPT-4: 83019      

  10/07/2016

 

                    ADMIN INFLUENZA VIRUS VAC CPT-4:         10/07/2016

 

                    ROUTINE VENIPUNCTURE CPT-4: 13617        2016

 

                    ASSAY OF FREE THYROXINE CPT-4: 48768        2016

 

                    ASSAY THYROID STIM HORMONE CPT-4: 15621        2016

 

                    COMPREHEN METABOLIC PANEL CPT-4: 40697        2016

 

                    COMPLETE CBC W/AUTO DIFF WBC CPT-4: 93353        2016

 

                    LIPID PANEL         CPT-4: 22098        2016

 

                    A1C HPLC            CPT-4: 93698        2016

 

                    URINALYSIS NONAUTO W/O SCOPE CPT-4: 35706        2016

 

                    ROUTINE VENIPUNCTURE CPT-4: 22611        2015

 

                    METABOLIC PANEL TOTAL CA CPT-4: 43897        2015

 

                    PRESCRIP TRANSMIT VIA ERX SY CPT-4:         2015

 

                    FLU VACC PRSV FREE INC ANTIG 65 AND OLDER CPT-4: 47190      

  10/16/2015

 

                    ADMIN INFLUENZA VIRUS VAC CPT-4:         10/16/2015

 

                    URINALYSIS NONAUTO W/O SCOPE CPT-4: 74893        09/15/2015

 

                    URINE CULTURE/ COLONY COUNT CPT-4: 29373        09/15/2015

 

                    ROUTINE VENIPUNCTURE CPT-4: 75981        09/10/2015

 

                    ASSAY OF FREE THYROXINE CPT-4: 74511        09/10/2015

 

                    ASSAY THYROID STIM HORMONE CPT-4: 86605        09/10/2015

 

                    COMPREHEN METABOLIC PANEL CPT-4: 64569        09/10/2015

 

                    COMPLETE CBC W/AUTO DIFF WBC CPT-4: 10825        09/10/2015

 

                    LIPID PANEL         CPT-4: 14781        09/10/2015

 

                    A1C HPLC            CPT-4: 26200        09/10/2015

 

                    CERUM REMOVAL       CPT-4: 42792        2015

 

                    PRESCRIP TRANSMIT VIA ERX SY CPT-4:         2015

 

                    FLUZONE, 5ML (Medicare) CPT-4:         10/17/2014

 

                    ADMIN INFLUENZA VIRUS VAC CPT-4:         10/17/2014

 

                    PRESCRIP TRANSMIT VIA ERX SY CPT-4:         2014

 

                    PRESCRIP TRANSMIT VIA ERX SY CPT-4:         2014

 

                    PRESCRIP TRANSMIT VIA ERX SY CPT-4:         2014

 

                    ROUTINE VENIPUNCTURE CPT-4: 38135        2014

 

                    ASSAY OF FREE THYROXINE CPT-4: 45779        2014

 

                    ASSAY THYROID STIM HORMONE CPT-4: 98109        2014

 

                    COMPREHEN METABOLIC PANEL CPT-4: 28033        2014

 

                    COMPLETE CBC W/AUTO DIFF WBC CPT-4: 46911        2014

 

                    LIPID PANEL         CPT-4: 54200        2014

 

                    A1C HPLC            CPT-4: 71663        2014

 

                    VITAMIN B 12 FOLIC ACID CPT-4: 18714|78899  2014

 

                    PRESCRIP TRANSMIT VIA ERX SY CPT-4:         2014

 

                    PRESCRIP TRANSMIT VIA ERX SY CPT-4:         10/15/2013

 

                    FLUZONE, 5ML (Medicare) CPT-4:         2013

 

                    ADMIN INFLUENZA VIRUS VAC CPT-4:         2013

 

                    PRESCRIP TRANSMIT VIA ERX SY CPT-4:         2013

 

                    PRESCRIP TRANSMIT VIA ERX SY CPT-4:         05/10/2013

 

                    ROUTINE VENIPUNCTURE CPT-4: 75156        2012

 

                    ASSAY OF FREE THYROXINE CPT-4: 82141        2012

 

                    ASSAY THYROID STIM HORMONE CPT-4: 12403        2012

 

                    COMPREHEN METABOLIC PANEL CPT-4: 05052        2012

 

                    COMPLETE CBC W/AUTO DIFF WBC CPT-4: 47386        2012

 

                    LIPID PANEL         CPT-4: 37689        2012

 

                    A1C GLYCOSYLATED HEMOGLOBIN TEST CPT-4: 83317        

012

 

                    CERUM REMOVAL       CPT-4: 64221        2012

 

                    PRESCRIP TRANSMIT VIA ERX SY CPT-4:         2012

 

                    PRESCRIP TRANSMIT VIA ERX SY CPT-4:         10/10/2012

 

                    FLUZONE, 5ML (Medicare) CPT-4:         2012

 

                    ADMIN INFLUENZA VIRUS VAC CPT-4:         2012

 

                    ASSAY, GLUCOSE, BLOOD QUANT CPT-4: 28797        2012

 

                    URINALYSIS NONAUTO W/O SCOPE CPT-4: 33172        2012

 

                    URINE CULTURE/ COLONY COUNT CPT-4: 86181        2012

 

                    ROUTINE VENIPUNCTURE CPT-4: 18050        2012

 

                    ASSAY OF FREE THYROXINE CPT-4: 30328        2012

 

                    ASSAY THYROID STIM HORMONE CPT-4: 06682        2012

 

                    COMPREHEN METABOLIC PANEL CPT-4: 70228        2012

 

                    COMPLETE CBC W/AUTO DIFF WBC CPT-4: 82562        2012

 

                    LIPID PANEL         CPT-4: 17761        2012

 

                    ASSAY OF INSULIN    CPT-4: 54543        2012

 

                    A1C GLYCOSYLATED HEMOGLOBIN TEST CPT-4: 98281        

012

 

                    DRAIN/INJECT JOINT/BURSA CPT-4: 24338        2012

 

                    METHYLPREDNISOLONE 40 MG INJ CPT-4:         2012

 

                    TRIAMCINOLONE ACET INJ NOS CPT-4:         2012

 

                    PRESCRIP TRANSMIT VIA ERX SY CPT-4:         2012

 

                    PRESCRIP TRANSMIT VIA ERX SY CPT-4:         2012

 

                    METHYLPREDNISOLONE 40 MG INJ CPT-4:         2012

 

                    DRAIN/INJECT JOINT/BURSA CPT-4: 15048        2012

 

                    TRIAMCINOLONE ACET INJ NOS CPT-4:         2012

 

                    PRESCRIP TRANSMIT VIA ERX SY CPT-4:         2012

 

                    ROUTINE VENIPUNCTURE CPT-4: 49035        2012

 

                    ASSAY OF FREE THYROXINE CPT-4: 08019        2012

 

                    ASSAY THYROID STIM HORMONE CPT-4: 49297        2012

 

                    COMPREHEN METABOLIC PANEL CPT-4: 80507        2012

 

                    COMPLETE CBC W/AUTO DIFF WBC CPT-4: 96536        2012

 

                    LIPID PANEL         CPT-4: 23443        2012

 

                    PRESCRIP TRANSMIT VIA ERX SY CPT-4:         2012

 

                    CERUM REMOVAL       CPT-4: 21419        2011

 

                    PRESCRIP TRANSMIT VIA ERX SY CPT-4:         2011

 

                    FLUZONE, 5ML (Medicare) CPT-4:         10/05/2011

 

                    ADMIN INFLUENZA VIRUS VAC CPT-4:         10/05/2011

 

                    PRESCRIP TRANSMIT VIA ERX SY CPT-4:         2011

 

                    URINALYSIS NONAUTO W/O SCOPE CPT-4: 70695        2011

 

                    URINE CULTURE/ COLONY COUNT CPT-4: 93707        2011

 

                    CUR TOBACCO NON-USER CPT-4:         2011

 

                    ROUTINE VENIPUNCTURE CPT-4: 20132        2011

 

                    COMPLETE CBC W/AUTO DIFF WBC CPT-4: 76664        2011

 

                    COMPREHEN METABOLIC PANEL CPT-4: 30168        2011

 

                    LIPID PANEL         CPT-4: 74860        2011

 

                    ASSAY THYROID STIM HORMONE CPT-4: 31879        2011

 

                    ASSAY OF FREE THYROXINE CPT-4: 83437        2011

 

                    PRESCRIP TRANSMIT VIA ERX SY CPT-4:         2011

 

                    INJ TRIGGER POINT 1/2 MUSCL CPT-4: 20692        2011

 

                    TRIAMCINOLONE ACET INJ NOS CPT-4:         2011

 

                    METHYLPREDNISOLONE 40 MG INJ CPT-4:         2011

 

                    THER/PROPH/DIAG INJ SC/IM CPT-4: 25430        2011

 

                    KETOROLAC TROMETHAMINE INJ CPT-4:         2011

 

                    PRESCRIP TRANSMIT VIA ERX SY CPT-4:         2010

 

                    FLU VACCINE 3 YRS & > IM UP 64 CPT-4: 88678        10/06/201

0

 

                    ADMIN INFLUENZA VIRUS VAC CPT-4:         10/06/2010

 

                    URINALYSIS NONAUTO W/O SCOPE CPT-4: 03170        2010

 

                    URINE CULTURE/ COLONY COUNT CPT-4: 07051        2010

 

                    PRESCRIP TRANSMIT VIA ERX SY CPT-4:         2010

 

                    THER/PROPH/DIAG INJ SC/IM CPT-4: 14898        2010

 

                    VITAMIN B12 INJECTION CPT-4:         2010

 

                    THER/PROPH/DIAG INJ SC/IM CPT-4: 29690        2010

 

                    VITAMIN B12 INJECTION CPT-4:         2010

 

                    ROUTINE VENIPUNCTURE CPT-4: 16098        2010







Vital Signs





                          Date                      Vital

 

                2020      Blood Pressure 1: 148/66 Code: 8480-6 Heart Rate

 1: 56 bpm 

Respiratory Rate: 20 bpm SpO2: 99%           Temperature: 36.6 (C) / 97.8 (F) We

ight: 211 

lbs 

 

                2020      Blood Pressure 1: 138/64 Code: 8480-6 Heart Rate

 1: 52 bpm 

Respiratory Rate: 18 bpm  SpO2: 98%                 Temperature: 36.3 (C) / 97.4

 (F)

 

                    2020          Blood Pressure 1: 144/82 Code: 8480-6 He

art Rate 1: 56 bpm

 

                2020      Blood Pressure 1: 154/86 Code: 8480-6 Heart Rate

 1: 64 bpm 

Respiratory Rate: 20 bpm SpO2: 99%           Temperature: 37.1 (C) / 98.7 (F) We

ight: 215 

lbs 

 

             2019   Blood Pressure 1: 140/70 Code: 8480-6 Heart Rate 1: 57

 bpm SpO2: 99%    

Temperature: 35.9 (C) / 96.6 (F)        Weight: 215 lbs 

 

                06/10/2019      Blood Pressure 1: 144/70 Code: 8480-6 Heart Rate

 1: 60 bpm 

Respiratory Rate: 20 bpm SpO2: 95%           Temperature: 36.4 (C) / 97.6 (F) We

ight: 214 

lbs 

 

                2019      Blood Pressure 1: 128/78 Code: 8480-6 Heart Rate

 1: 56 bpm 

Respiratory Rate: 20 bpm SpO2: 98%           Temperature: 36.3 (C) / 97.4 (F) We

ight: 213 

lbs 

 

                2018      Blood Pressure 1: 142/60 Code: 8480-6 BMI: 38.2 

Code: 57414-9 Heart 

Rate 1: 48 bpm  Height: 5'2"    Respiratory Rate: 20 bpm SpO2: 98%       Tempera

ture: 36.7

(C) / 98.1 (F)                          Weight: 212 lbs 

 

                2018      Blood Pressure 1: 142/64 Code: 8480-6 BMI: 38.5 

Code: 05132-3 Heart 

Rate 1: 52 bpm  Height: 5'2"    Respiratory Rate: 22 bpm SpO2: 96%       Tempera

ture: 36.1

(C) / 96.9 (F)                          Weight: 214 lbs 

 

                10/02/2018      Blood Pressure 1: 124/80 Code: 8480-6 BMI: 38.3 

Code: 56471-3 Heart 

Rate 1: 68 bpm      Height: 5'2"        Respiratory Rate: 20 bpm Temperature: 36

.3 (C) / 

97.4 (F)                                Weight: 213 lbs 

 

                2018      Blood Pressure 1: 132/78 Code: 8480-6 BMI: 37.6 

Code: 26150-8 Heart 

Rate 1: 68 bpm  Height: 5'2"    Respiratory Rate: 20 bpm SpO2: 97%       Tempera

ture: 36.8

(C) / 98.2 (F)                          Weight: 209 lbs 

 

                2018      Blood Pressure 1: 150/76 Code: 8480-6 BMI: 38.5 

Code: 42978-8 Heart 

Rate 1: 64 bpm  Height: 5'2"    Respiratory Rate: 20 bpm SpO2: 97%       Tempera

ture: 36.2

(C) / 97.2 (F)                          Weight: 214 lbs 

 

                2018      Blood Pressure 1: 122/74 Code: 8480-6 BMI: 38.2 

Code: 81504-8 Heart 

Rate 1: 64 bpm  Height: 5'2"    Respiratory Rate: 18 bpm SpO2: 96%       Tempera

ture: 35.8

(C) / 96.4 (F)                          Weight: 212 lbs 

 

                2018      Blood Pressure 1: 124/78 Code: 8480-6 BMI: 37.8 

Code: 48708-3 Heart 

Rate 1: 76 bpm      Height: 5'2"        Respiratory Rate: 20 bpm Temperature: 36

.8 (C) / 

98.3 (F)                                Weight: 210 lbs 

 

                2018      Blood Pressure 1: 136/70 Code: 8480-6 BMI: 38.0 

Code: 69827-9 Heart 

Rate 1: 68 bpm  Height: 5'2"    Respiratory Rate: 20 bpm SpO2: 97%       Tempera

ture: 36.8

(C) / 98.2 (F)                          Weight: 211 lbs 

 

                2018      Blood Pressure 1: 140/65 Code: 8480-6 Heart Rate

 1: 75 bpm 

Respiratory Rate: 24 bpm SpO2: 95%           Temperature: 37.0 (C) / 98.6 (F) We

ight: 211 

lbs 

 

                2018      Blood Pressure 1: 154/70 Code: 8480-6 BMI: 37.6 

Code: 26250-9 Heart 

Rate 1: 76 bpm  Height: 5'2"    Respiratory Rate: 20 bpm SpO2: 98%       Tempera

ture: 36.9

(C) / 98.5 (F)                          Weight: 209 lbs 

 

                2017      Blood Pressure 1: 156/70 Code: 8480-6 BMI: 37.1 

Code: 26071-9 Heart 

Rate 1: 72 bpm  Height: 5'2"    Respiratory Rate: 20 bpm SpO2: 97%       Tempera

ture: 37.0

(C) / 98.6 (F)                          Weight: 206 lbs 

 

                2017      Blood Pressure 1: 152/78 Code: 8480-6 BMI: 36.8 

Code: 48235-7 Heart 

Rate 1: 78 bpm  Height: 5'2"    Respiratory Rate: 20 bpm SpO2: 98%       Tempera

ture: 36.1

(C) / 97.0 (F)                          Weight: 204 lbs 

 

                2017      Blood Pressure 1: 142/70 Code: 8480-6 BMI: 36.9 

Code: 38535-6 Heart 

Rate 1: 64 bpm  Height: 5'2"    Respiratory Rate: 20 bpm SpO2: 96%       Tempera

ture: 36.5

(C) / 97.7 (F)                          Weight: 205 lbs 

 

                2017      Blood Pressure 1: 142/68 Code: 8480-6 Heart Rate

 1: 88 bpm 

Respiratory Rate: 18 bpm SpO2: 98%           Temperature: 36.3 (C) / 97.3 (F) We

ight: 209 

lbs 

 

                2016      Blood Pressure 1: 130/78 Code: 8480-6 Heart Rate

 1: 68 bpm 

Respiratory Rate: 20 bpm SpO2: 95%           Temperature: 36.4 (C) / 97.6 (F) We

ight: 212 

lbs 

 

                2016      Blood Pressure 1: 122/64 Code: 8480-6 BMI: 39.1 

Code: 44362-5 Heart 

Rate 1: 76 bpm      Height: 5'2"        Respiratory Rate: 20 bpm Temperature: 36

.8 (C) / 

98.2 (F)                                Weight: 217 lbs 

 

                2016      Blood Pressure 1: 144/70 Code: 8480-6 BMI: 39.4 

Code: 38529-8 Heart 

Rate 1: 76 bpm      Height: 5'2"        Respiratory Rate: 20 bpm Temperature: 36

.6 (C) / 

97.9 (F)                                Weight: 219 lbs 

 

                2015      Blood Pressure 1: 152/60 Code: 8480-6 BMI: 39.6 

Code: 21982-4 Heart 

Rate 1: 84 bpm      Height: 5'2"        Respiratory Rate: 20 bpm Temperature: 37

.0 (C) / 

98.6 (F)                                Weight: 220 lbs 

 

                2015      Blood Pressure 1: 146/76 Code: 8480-6 BMI: 39.8 

Code: 59665-9 Heart 

Rate 1: 88 bpm      Height: 5'2"        Respiratory Rate: 20 bpm Temperature: 37

.0 (C) / 

98.6 (F)                                Weight: 221 lbs 

 

                2015      Blood Pressure 1: 132/70 Code: 8480-6 BMI: 39.1 

Code: 94307-0 Heart 

Rate 1: 88 bpm      Height: 5'2"        Respiratory Rate: 20 bpm Temperature: 36

.4 (C) / 

97.6 (F)                                Weight: 217 lbs 

 

                2015      Blood Pressure 1: 132/66 Code: 8480-6 BMI: 39.9 

Code: 31549-5 Heart 

Rate 1: 72 bpm      Height: 5'2"        Respiratory Rate: 20 bpm Temperature: 36

.9 (C) / 

98.4 (F)                                Weight: 218 lbs 

 

                2015      Blood Pressure 1: 136/80 Code: 8480-6 Heart Rate

 1: 76 bpm 

Respiratory Rate: 20 bpm  Temperature: 36.7 (C) / 98.0 (F) Weight: 224 lbs 

 

                2015      Blood Pressure 1: 134/78 Code: 8480-6 BMI: 39.7 

Code: 86569-7 Heart 

Rate 1: 84 bpm      Height: 5'2"        Respiratory Rate: 20 bpm Temperature: 36

.7 (C) / 

98.0 (F)                                Weight: 217 lbs 

 

                2014      Blood Pressure 1: 146/78 Code: 8480-6 BMI: 39.5 

Code: 27952-0 Heart 

Rate 1: 82 bpm      Height: 5'2"        Respiratory Rate: 18 bpm Temperature: 35

.6 (C) / 

96.1 (F)                                Weight: 216 lbs 

 

                2014      Blood Pressure 1: 134/70 Code: 8480-6 BMI: 37.9 

Code: 82560-5 Heart 

Rate 1: 80 bpm      Height: 5'3"        Respiratory Rate: 20 bpm Temperature: 36

.8 (C) / 

98.2 (F)                                Weight: 214 lbs 

 

                2014      Blood Pressure 1: 132/70 Code: 8480-6 BMI: 37.6 

Code: 54006-0 Heart 

Rate 1: 80 bpm      Height: 5'3"        Respiratory Rate: 20 bpm Temperature: 36

.8 (C) / 

98.3 (F)                                Weight: 212 lbs 

 

                2014      Blood Pressure 1: 116/74 Code: 8480-6 Heart Rate

 1: 68 bpm 

Respiratory Rate: 20 bpm  Temperature: 36.2 (C) / 97.1 (F) Weight: 212 lbs 

 

                10/15/2013      Blood Pressure 1: 132/82 Code: 8480-6 BMI: 37.4 

Code: 44465-4 Heart 

Rate 1: 76 bpm      Height: 5'3"        Respiratory Rate: 20 bpm Temperature: 36

.7 (C) / 

98.0 (F)                                Weight: 211 lbs 

 

                    2013          Blood Pressure 1: 130/76 Code: 8480-6 He

art Rate 1: 78 bpm

 

                2013      Blood Pressure 1: 140/82 Code: 8480-6 BMI: 36.8 

Code: 91945-7 Heart 

Rate 1: 66 bpm      Height: 5'3"        Respiratory Rate: 20 bpm Temperature: 36

.1 (C) / 

96.9 (F)                                Weight: 208 lbs 

 

                2013      Blood Pressure 1: 138/80 Code: 8480-6 BMI: 36.4 

Code: 49314-6 Heart 

Rate 1: 72 bpm      Height: 5'4"        Respiratory Rate: 20 bpm Temperature: 36

.7 (C) / 

98.0 (F)                                Weight: 212 lbs 

 

                2013      Blood Pressure 1: 124/70 Code: 8480-6 BMI: 36.9 

Code: 46056-1 Heart 

Rate 1: 60 bpm      Height: 5'4"        Temperature: 36.1 (C) / 97.0 (F) Weight:

 215 lbs 

 

                05/10/2013      Blood Pressure 1: 132/86 Code: 8480-6 BMI: 36.9 

Code: 12421-1 Heart 

Rate 1: 76 bpm      Height: 5'4"        Respiratory Rate: 20 bpm Temperature: 36

.8 (C) / 

98.2 (F)                                Weight: 215 lbs 

 

                2013      Blood Pressure 1: 134/82 Code: 8480-6 BMI: 36.6 

Code: 35160-4 Heart 

Rate 1: 72 bpm      Height: 5'4"        Respiratory Rate: 20 bpm Temperature: 36

.3 (C) / 

97.4 (F)                                Weight: 213 lbs 

 

                2012      Blood Pressure 1: 142/80 Code: 8480-6 BMI: 37.1 

Code: 62941-0 Heart 

Rate 1: 76 bpm      Height: 5'4"        Respiratory Rate: 20 bpm Temperature: 36

.8 (C) / 

98.3 (F)                                Weight: 216 lbs 

 

                10/23/2012      Blood Pressure 1: 128/68 Code: 8480-6 BMI: 37.6 

Code: 78527-1 Heart 

Rate 1: 72 bpm      Height: 5'4"        Respiratory Rate: 20 bpm Temperature: 36

.6 (C) / 

97.9 (F)                                Weight: 219 lbs 

 

                10/10/2012      Blood Pressure 1: 122/70 Code: 8480-6 Heart Rate

 1: 76 bpm 

Respiratory Rate: 20 bpm  Temperature: 36.7 (C) / 98.1 (F) Weight: 218 lbs 

 

                    2012          Blood Pressure 1: 132/80 Code: 8480-6 He

art Rate 1: 84 bpm

 

                2012      Blood Pressure 1: 128/78 Code: 8480-6 BMI: 38.1 

Code: 87285-3 Heart 

Rate 1: 84 bpm      Height: 5'4"        Respiratory Rate: 20 bpm Temperature: 36

.9 (C) / 

98.4 (F)                                Weight: 222 lbs 

 

                2012      Blood Pressure 1: 138/80 Code: 8480-6 BMI: 37.9 

Code: 96767-6 Heart 

Rate 1: 74 bpm      Height: 5'4"        Temperature: 36.1 (C) / 97.0 (F) Weight:

 221 lbs 

 

                2012      Blood Pressure 1: 126/78 Code: 8480-6 BMI: 38.4 

Code: 20252-9 Heart 

Rate 1: 72 bpm      Height: 5'4"        Respiratory Rate: 20 bpm Temperature: 36

.7 (C) / 

98.0 (F)                                Weight: 224 lbs 

 

                2012      Blood Pressure 1: 138/72 Code: 8480-6 BMI: 38.4 

Code: 76279-9 Heart 

Rate 1: 72 bpm      Height: 5'4"        Respiratory Rate: 20 bpm Temperature: 36

.6 (C) / 

97.9 (F)                                Weight: 224 lbs 

 

                2012      Blood Pressure 1: 122/78 Code: 8480-6 BMI: 38.8 

Code: 97694-9 Heart 

Rate 1: 88 bpm      Height: 5'4"        Respiratory Rate: 20 bpm Temperature: 36

.6 (C) / 

97.8 (F)                                Weight: 226 lbs 

 

                2012      Blood Pressure 1: 116/60 Code: 8480-6 BMI: 38.1 

Code: 37388-0 Heart 

Rate 1: 92 bpm      Height: 5'4"        Respiratory Rate: 20 bpm Temperature: 36

.8 (C) / 

98.2 (F)                                Weight: 222 lbs 

 

                2011      Blood Pressure 1: 118/62 Code: 8480-6 BMI: 37.9 

Code: 64013-5 Heart 

Rate 1: 80 bpm      Height: 5'4"        Temperature: 36.5 (C) / 97.7 (F) Weight:

 221 lbs 

 

                2011      Blood Pressure 1: 132/70 Code: 8480-6 Heart Rate

 1: 84 bpm 

Respiratory Rate: 20 bpm  Temperature: 36.7 (C) / 98.0 (F) Weight: 221 lbs 

 

                2011      Blood Pressure 1: 114/72 Code: 8480-6 BMI: 37.6 

Code: 55871-1 Heart 

Rate 1: 76 bpm      Height: 5'4"        Respiratory Rate: 20 bpm Temperature: 36

.8 (C) / 

98.3 (F)                                Weight: 219 lbs 

 

                    2011          Blood Pressure 1: 136/76 Code: 8480-6 Te

mperature: 36.0 (C) / 96.8 

(F)                                     Weight: 219 lbs 8 oz

 

                2011      Blood Pressure 1: 128/80 Code: 8480-6 Heart Rate

 1: 72 bpm 

Temperature: 36.3 (C) / 97.4 (F)        Weight: 218 lbs 

 

                2011      Blood Pressure 1: 126/70 Code: 8480-6 Heart Rate

 1: 72 bpm 

Temperature: 36.7 (C) / 98.0 (F)

 

                    2010          Blood Pressure 1: 126/78 Code: 8480-6 Te

mperature: 36.2 (C) / 97.1 

(F)                                     Weight: 217 lbs 8 oz

 

                2010      Blood Pressure 1: 116/70 Code: 8480-6 Heart Rate

 1: 72 bpm 

Temperature: 36.4 (C) / 97.6 (F)        Weight: 222 lbs 

 

                2010      Blood Pressure 1: 114/78 Code: 8480-6 Heart Rate

 1: 72 bpm 

Temperature: 37.1 (C) / 98.8 (F)        Weight: 225 lbs 

 

                2010      Blood Pressure 1: 128/78 Code: 8480-6 Heart Rate

 1: 76 bpm 

Temperature: 36.6 (C) / 97.8 (F)        Weight: 224 lbs 

 

                2010      Blood Pressure 1: 116/78 Code: 8480-6 Heart Rate

 1: 80 bpm 

Temperature: 36.4 (C) / 97.6 (F)        Weight: 226 lbs 

 

                2010      Blood Pressure 1: 120/70 Code: 8480-6 BMI: 38.3 

Code: 88779-0 Heart 

Rate 1: 88 bpm      Height: 5'5"        Temperature: 36.4 (C) / 97.6 (F) Weight:

 230 lbs 







Functional Status

No Functional Status data



Reason For Visit





                    Reason For Visit    Effective Dates     Notes

 

                    follow up           2020           

 

                    follow up           2020           

 

                    blood pressure check 2020           

 

                    high blood pressure 2020           

 

                    lab draw            2019           

 

                    lab draw            10/11/2019           

 

                    hearing loss        2019          left ear

 

                    follow up           06/10/2019           

 

                    follow up           2019          Patient has upcoming

 appointment with Dr Claire and carotid 

dopplers tomorrow

 

                    follow up           2018          Patient had heart ca

th on 10-30-18 and one of the bypass 

veins in spasming

 

                    follow up           2018          4 Week

 

                    follow up           10/02/2018           

 

                    follow up           2018           

 

                    high blood pressure 2018          Dr Claire has ordered

 holter monitor and 

hydralazine 50mg PRN

 

                    dizziness           2018          On  morning pa

sammint woke up and went to the bathroom 

and after lying down became very dizzy. Patient has hx of vertigo so didn't 
think anything of it. She usually just has them intermittently, but had more 
that day. While at [facility] began to feel very ill and became very shaky. She got 
home and sat in the recliner and had the jittery/nervous feeling. Later that 
night it finally resolved. Patient feels like she had a spell like this around 
the  and thought it was due to extreme heat exhaustion.

 

                    follow up           2018           

 

                    back pain           2018           

 

                    otalgia             2018           

 

                    back pain           2018           

 

                    injection(s)        10/06/2017          flu shot

 

                    lab draw            2017           

 

                    follow up           2017           

 

                    pelvic pain         2017          Patient states pain 

started in May with a "Constant Ache"

to left inguinal area. Patient states at that time pain was intermittent. Then, 
approximately 2nd week  of  pain worsened and radiates to left low back 
area.

 

                    lab draw            2017           

 

                    hyperglycemia       2017           

 

                    cough               2017           

 

                    otalgia             2016           

 

                    injection(s)        10/07/2016          Influenza

 

                    lab draw            2016           

 

                    constipation        2016           

 

                    flank pain          2016           

 

                    follow up           2015          3mo fwup

 

                    injection(s)        10/16/2015          Influenza

 

                    acute renal failure 09/15/2015           

 

                    lab draw            09/10/2015           

 

                    fatigue             2015           

 

                    otalgia             2015           

 

                    pain, limb          2015           

 

                    follow up           2015          Hospital fwup

 

                    high blood pressure 2015           

 

                    injection(s)        10/17/2014          flu shot

 

                    sinusitis           2014           

 

                    high blood pressure 2014           

 

                    cough               2014          Was panfilo at Dr Tyree teixeira's office so he started he on amlodipine 

10mg but seems to be dragging her down. Patient decreased to 1/2 tablet this 
last week

 

                    lab draw            2014           

 

                    cough               2014           

 

                    cough               10/15/2013           

 

                    high blood pressure 2013           

 

                    follow up           2013          ER

 

                    dizziness           2013           

 

                    follow up           2013           

 

                    otalgia             05/10/2013           

 

                    breast complaint    2013           

 

                    lab draw            2012           

 

                    follow up           2012          2mo fwup

 

                    follow up           10/23/2012          2wk fwup

 

                    gas and bloating    10/10/2012          Dr Claire has her mon

itoring because was high in his 

office at last visit

 

                    injection(s)        2012           

 

                    dizziness           2012           

 

                    dizziness           2012           

 

                    lab draw            2012           

 

                    muscle weakness     2012          concerns of simvasta

tin with fatigue and muscle 

weakness

 

                    leg pain/sciatica   2012           

 

                    hip pain            2012           

 

                    back pain           2012          has tried ice pack, 

muscle relaxers, and moist heat

 

                    back pain           2012           

 

                    fatigue             2012          in hands/feet

 

                    otalgia             2011           

 

                    follow up           2011          2wk fwup

 

                    back pain           2011          thinks ulcer may hav

e returned

 

                    lab draw            2011           

 

                    dizziness           2011          Left ear and facial 

pain, right ear pain 

 

                    follow up           2011          1wk fwup

 

                    hip pain            2011          feels very draggy, f

atigue, BP 80/63, normally running 

100's/60's

 

                    chills              2010           

 

                    injection(s)        10/06/2010          flu shot

 

                    follow up           2010          6wk fwup, had HH rep

aired and is starting to feel better, 

started on reglan 10mg tid

 

                    follow up           2010          hosp fwup

 

                    follow up           2010          1mo fwup, saw Dr Danna du and hasn't gave cardiac clearance 

yet for HH repair, did have chemical stress test yesterday and waiting on 
results

 

                    follow up           2010          from EGD/colonoscopy

--has Hiatal Hernia and wants to do 

repair

 

                    follow up           2010           







Encounters





             Encounter    Performer    Location     Codes        Date

 

                                        ([id number]) OFFICE/OUTPATIENT VISIT EST

Diagnosis: Essential (primary) hypertension[ICD10: I10]

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

 

                                        ([id number]) OFFICE/OUTPATIENT VISIT EST

Diagnosis: Essential (primary) hypertension[ICD10: I10]

Diagnosis: Palpitations[ICD10: R00.2] Akanksha GUNN 

[employer]                    CPT-4: 89679              2020

 

                                        (05791) OFFICE/OUTPATIENT VISIT EST

Diagnosis: Essential hypertension[ICD10: I10]

Diagnosis: Palpitations[ICD10: R00.2]

Diagnosis: Stress reaction[ICD10: F43.0] [employer]ha [employer]            CPT-4: 21470              2020

 

                                        (64458) NURSE/OUTPATIENT VISIT EST

Diagnosis: Mixed hyperlipidemia[ICD10: E78.2] Akanksha DRIVER DO [facility]            CPT-4: 85963              2019

 

                                        (34251) NURSE/OUTPATIENT VISIT EST

Diagnosis: FLU VACCINE[ICD10: Z23] Akanksha KEY DO 

[facility]                       CPT-4: 54650              10/22/2019

 

                                        (54221) NURSE/OUTPATIENT VISIT EST

Diagnosis: Chronic kidney disease, stage 1[ICD10: N18.1] Akanksha DRIVER DO [facility] CPT-4: 73124              10/11/2019

 

                                        (77379) OFFICE/OUTPATIENT VISIT EST

Diagnosis: Acute serous otitis media, left ear[ICD10: H65.02] Nat DRIVER DO [facility] CPT-4: 28439              2019

 

                                        (34385) OFFICE/OUTPATIENT VISIT EST

Diagnosis: Essential (primary) hypertension[ICD10: I10]

Diagnosis: Coronary atherosclerosis due to calcified coronary lesion[ICD10: 
I25.84]

Diagnosis: Hypoglycemia, unspecified[ICD10: E16.2]

Diagnosis: Other fatigue[ICD10: R53.83]

Diagnosis: Urinary tract infection, site not specified[ICD10: N39.0] Akanksha DRIVER DO [facility] CPT-4: 62775        06/10/2019

 

                                        (11254) OFFICE/OUTPATIENT VISIT EST

Diagnosis: Essential (primary) hypertension[ICD10: I10]

Diagnosis: Gastro-esophageal reflux disease without esophagitis[ICD10: K21.9]

Diagnosis: Urinary tract infection, site not specified[ICD10: N39.0] Akanksha DRIVER DO [facility] CPT-4: 53908        2019

 

                                        (34807) OFFICE/OUTPATIENT VISIT EST

Diagnosis: Gastro-esophageal reflux disease without esophagitis[ICD10: K21.9]

Diagnosis: Epigastric pain[ICD10: R10.13] Akanksha DRIVER [employer]            CPT-4: 79772              2018

 

                                        (95418) OFFICE/OUTPATIENT VISIT EST

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

Diagnosis: Essential (primary) hypertension[ICD10: I10]

Diagnosis: Generalized anxiety disorder[ICD10: F41.1]

Diagnosis: Gastro-esophageal reflux disease without esophagitis[ICD10: K21.9] 

Akanksha DRIVER [employer] [facility] CPT-4: 57362        2018

 

                                        OFFICE/OUTPATIENT VISIT EST

Diagnosis: Gastro-esophageal reflux disease without esophagitis[ICD10: K21.9]

Diagnosis: Palpitations[ICD10: R00.2]

Diagnosis: Urinary tract infection, site not specified[ICD10: N39.0]

Diagnosis: Generalized anxiety disorder[ICD10: F41.1] Akanksha SPENCER [employer] [facility] CPT-4: 98388              10/02/2018

 

                                        (18921) NURSE/OUTPATIENT VISIT EST

Diagnosis: Coronary atherosclerosis due to calcified coronary lesion[ICD10: 
I25.84]

Diagnosis: Hypoglycemia, unspecified[ICD10: E16.2]

Diagnosis: Dizziness and giddiness[ICD10: R42]

Diagnosis: Occlusion and stenosis of bilateral carotid arteries[ICD10: I65.23] 

Akanksha DRIVER [employer] [facility] CPT-4: 84148        2018

 

                                        OFFICE/OUTPATIENT VISIT EST

Diagnosis: Epigastric pain[ICD10: R10.13]

Diagnosis: Generalized anxiety disorder[ICD10: F41.1]

Diagnosis: FLU VACCINE[ICD10: Z23] Akanksha SPENCER

 [employer] 

[facility]                       CPT-4: 51988              2018

 

                                        (93503) OFFICE/OUTPATIENT VISIT EST

Diagnosis: Essential (primary) hypertension[ICD10: I10]

Diagnosis: Hypoglycemia, unspecified[ICD10: E16.2]

Diagnosis: Gastro-esophageal reflux disease without esophagitis[ICD10: K21.9] 

Akanksha DRIVER [facility] CPT-4: 69444        2018

 

                                        (32951) OFFICE/OUTPATIENT VISIT EST

Diagnosis: Dizziness and giddiness[ICD10: R42] Nat DRIVER [facility]            CPT-4: 45460              2018

 

                                        (95641) OFFICE/OUTPATIENT VISIT EST

Diagnosis: Cervicalgia[ICD10: M54.2]

Diagnosis: Other spondylosis with radiculopathy, cervical region[ICD10: M47.22] 

Akanksha DRIVER [facility] CPT-4: 68898        2018

 

                                        (27639) OFFICE/OUTPATIENT VISIT EST

Diagnosis: Hypoglycemia, unspecified[ICD10: E16.2]

Diagnosis: Other spondylosis with radiculopathy, cervical region[ICD10: M47.22]

Diagnosis: Vertigo of central origin, bilateral[ICD10: H81.43]

Diagnosis: Cervicocranial syndrome[ICD10: M53.0] [facility]         CPT[facility]: 50092              2018

 

                                        (89775) OFFICE/OUTPATIENT VISIT EST

Diagnosis: Benign paroxysmal vertigo, bilateral[ICD10: H81.13]

Diagnosis: Otalgia, bilateral[ICD10: H92.03] Nat DRIVER [facility]            CPT-4: 20746              2018

 

                                        (33605) OFFICE/OUTPATIENT VISIT EST

Diagnosis: Low back pain[ICD10: M54.5]

Diagnosis: Radiculopathy, lumbosacral region[ICD10: M54.17]

Diagnosis: Left lower quadrant pain[ICD10: R10.32] Akanksha Xiangndangela IZQUIERDOTAMMY

OLIVIA DRIVER [employer] [facility]         CPT-4: 43593              2018

 

                                        (98962) OFFICE/OUTPATIENT VISIT EST

Diagnosis: FLU VACCINE[ICD10: Z23] Akanksha Otoolendangela SPENCER

[facility]                       CPT-4: 83270              10/06/2017

 

                                        (16680) OFFICE/OUTPATIENT VISIT EST

Diagnosis: Mixed hyperlipidemia[ICD10: E78.2]

Diagnosis: Essential (primary) hypertension[ICD10: I10]

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

Diagnosis: Impaired fasting glucose[ICD10: R73.01]

Diagnosis: Other fatigue[ICD10: R53.83] Akanksha DRIVER

[facility]                    CPT-4: 39328              2017

 

                                        (44874) OFFICE/OUTPATIENT VISIT EST

Diagnosis: Pain in thoracic spine[ICD10: M54.6]

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 

Akanksha DRIVER [facility] CPT-4: 49659        2017

 

                                        (75132) OFFICE/OUTPATIENT VISIT EST

Diagnosis: Left lower quadrant pain[ICD10: R10.32]

Diagnosis: Low back pain[ICD10: M54.5] Akanksha SYKES 

[facility]                    CPT-4: 77530              2017

 

                                        (26486) OFFICE/OUTPATIENT VISIT EST

Diagnosis: Mixed hyperlipidemia[ICD10: E78.2]

Diagnosis: Essential (primary) hypertension[ICD10: I10]

Diagnosis: Hypoglycemia, unspecified[ICD10: E16.2] Akanksha DRIVER [facility]         CPT-4: 96096              2017

 

                                        (49001) OFFICE/OUTPATIENT VISIT EST

Diagnosis: Impaired fasting glucose[ICD10: R73.01]

Diagnosis: Mastodynia[ICD10: N64.4]

Diagnosis: Mixed hyperlipidemia[ICD10: E78.2]

Diagnosis: Essential (primary) hypertension[ICD10: I10]

Diagnosis: Other fatigue[ICD10: R53.83] Akanksha DRIVER

[facility]                    CPT-4: 42982              2017

 

                                        (77000) OFFICE/OUTPATIENT VISIT EST

Diagnosis: Acute upper respiratory infection, unspecified[ICD10: J06.9] Luba Stevenson              AKANKSHA DRIVER [facility] CPT-4: 68168        2017

 

                                        (99990) OFFICE/OUTPATIENT VISIT EST

Diagnosis: Acute mastoiditis without complications, right ear[ICD10: H70.001] 

Akanksha Villa DRIVER [facility] CPT-4: 33282        2016

 

                                        (03953) OFFICE/OUTPATIENT VISIT EST

Diagnosis: FLU VACCINE[ICD10: Z23] Akanksha KEY [facility]                       CPT-4: 73115              10/07/2016

 

                                        (33022) OFFICE/OUTPATIENT VISIT EST

Diagnosis: Mixed hyperlipidemia[ICD10: E78.2]

Diagnosis: Essential (primary) hypertension[ICD10: I10]

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

Diagnosis: Impaired fasting glucose[ICD10: R73.01] Akanksha IZQUIERDOTAMMY

OLIVIA DRIVER DO [facility]         CPT-4: 26928              2016

 

                                        (09187) OFFICE/OUTPATIENT VISIT EST

Diagnosis: Constipation, unspecified[ICD10: K59.00] Akanksha DRIVER DO [facility] CPT-4: 20289              2016

 

                                        (20573) OFFICE/OUTPATIENT VISIT EST

Diagnosis: Essential (primary) hypertension[ICD10: I10]

Diagnosis: Mixed hyperlipidemia[ICD10: E78.2]

Diagnosis: Chronic kidney disease, stage 1[ICD10: N18.1] Akanksha DRIVER [facility] CPT-4: 11130              2016

 

                                        (45132) OFFICE/OUTPATIENT VISIT EST

Diagnosis: Muscle weakness (generalized)[ICD10: M62.81]

Diagnosis: Dizziness and giddiness[ICD10: R42]

Diagnosis: Essential (primary) hypertension[ICD10: I10]

Diagnosis: History of falling[ICD10: Z91.81] Akankshatammy KEVINRAMOS DRIVER [facility]            CPT-4: 38920              2015

 

                                        (10654) OFFICE/OUTPATIENT VISIT EST

Diagnosis: FLU VACCINE[ICD10: Z23] Akanksha Otoolerodo        AKANKSHA OLIVIA KEY [facility]                       CPT-4: 08793              10/16/2015

 

                                        (42796) OFFICE/OUTPATIENT VISIT EST

Diagnosis: Acute renal failure[ICD9: 584.9]

Diagnosis: POLYURIA[ICD9: 788.42] Akankshatammy KEVINQUELINE SAramis GUERRERO LANCE [facility]                       CPT-4: 98872              09/15/2015

 

                                        (87251) OFFICE/OUTPATIENT VISIT EST

Diagnosis: HYPERLIPIDEMIA NEC/NOS[ICD9: 272.4]

Diagnosis: HYPERTENSION[ICD9: 401.9]

Diagnosis: CAD[ICD9: 414.00]

Diagnosis: Hyperglycemia[ICD9: 790.29]

Diagnosis: MALAISE AND FATIGUE[ICD9: 780.79] Akanksha DRIVER [employer] [facility]            CPT-4: 96707              09/10/2015

 

                                        ([id number]) OFFICE/OUTPATIENT VISIT EST

Diagnosis: MALAISE AND FATIGUE[ICD9: 780.79]

Diagnosis: HYPOGLYCEMIA[ICD9: 251.2]

Diagnosis: Grieving[ICD9: 309.0]

Diagnosis: ABDOMINAL PAIN[ICD9: 789.00] Akanksha DRIVER

[employer] [facility]                    CPT-4: 52015              2015

 

                                        OFFICE/OUTPATIENT VISIT EST

Diagnosis: CERUMEN IMPACTION[ICD9: 380.4]

Diagnosis: EUSTACHIAN TUBE DYSFUNCTION[ICD9: 381.81] Bertha Elieser      

AKANKSHA DRIVER [employer] [facility] CPT-4: 23797              2015

 

                                        (75662) OFFICE/OUTPATIENT VISIT EST

Diagnosis: Leg pain[ICD9: 729.5]

Diagnosis: SUPERFIC PHLEBITIS-LEG[ICD9: 451.0] Akanksha DRIVER [employer] [facility]            CPT-4: 43736              2015

 

                                        (41543) OFFICE/OUTPATIENT VISIT EST

Diagnosis: Thoracic back pain[ICD9: 724.1]

Diagnosis: SPASM OF MUSCLE[ICD9: 728.85] Akanksha DRIVER [employer] [facility]            CPT-4: 27304              2015

 

                                        (37470) OFFICE/OUTPATIENT VISIT EST

Diagnosis: DIZZINESS/VERTIGO[ICD9: 780.4]

Diagnosis: Benign positional vertigo[ICD9: 386.11]

Diagnosis: HYPERTENSION[ICD9: 401.9]

Diagnosis: Suspicious nevus[ICD9: 238.2] Akanksha DRIVER [employer] [facility]            CPT-4: 60364              2015

 

                                        (23890) OFFICE/OUTPATIENT VISIT EST

Diagnosis: FLU VACCINE[ICD10: Z23] Akanksha SPENCER

[facility]                       CPT-4: 68433              10/17/2014

 

                                        OFFICE/OUTPATIENT VISIT EST

Diagnosis: SINUSITIS, ACUTE[ICD9: 461.9]

Diagnosis: EUSTACHIAN TUBE DYSFUNCTION[ICD9: 381.81] [facility] CPT-[facility]: 82939              2014

 

                                        (38688) OFFICE/OUTPATIENT VISIT EST

Diagnosis: DIZZINESS/VERTIGO[ICD9: 780.4]

Diagnosis: HYPERTENSION[ICD9: 401.9] Akanksha OTOOLE

[facility]                       CPT-4: 26264              2014

 

                                        (71858) OFFICE/OUTPATIENT VISIT EST

Diagnosis: HYPERTENSION[ICD9: 401.9]

Diagnosis: MALAISE AND FATIGUE[ICD9: 780.79]

Diagnosis: SINUSITIS, ACUTE[ICD9: 461.9] [facility]            CPT-4: 34326              2014

 

                                        (08081) OFFICE/OUTPATIENT VISIT EST

Diagnosis: HYPERLIPIDEMIA NEC/NOS[ICD9: 272.4]

Diagnosis: HYPERTENSION[ICD9: 401.9]

Diagnosis: B12 DEFIC ANEMIA NEC[ICD9: 281.1]

Diagnosis: HYPOGLYCEMIA[ICD9: 251.2] [facility]                       CPT[facility]4: 41375              2014

 

                                        OFFICE/OUTPATIENT VISIT EST

Diagnosis: COUGH[ICD9: 786.2] [facility] 

CPT-4: 86256                            2014

 

                                        OFFICE/OUTPATIENT VISIT EST

Diagnosis: COUGH[ICD9: 786.2]

Diagnosis: URI, ACUTE[ICD9: 465.9] Bertha SPENCER

[facility]                       CPT-4: 56053              10/15/2013

 

                                        (40123) OFFICE/OUTPATIENT VISIT EST

Diagnosis: HYPERTENSION[ICD9: 401.9]

Diagnosis: CEPHALGIA[ICD9: 784.0]

Diagnosis: ANXIETY STATE NOS[ICD9: 300.00]

Diagnosis: FLU VACCINE[ICD9: V04.81] Akanksha ROTHMANR [facility]                       CPT-4: 00200              2013

 

                                        (16964) OFFICE/OUTPATIENT VISIT EST

Diagnosis: DIZZINESS/VERTIGO[ICD9: 780.4]

Diagnosis: SINUSITIS, ACUTE[ICD9: 461.9]

Diagnosis: Benign positional vertigo[ICD9: 386.11] Akanksha DRIVER [facility]         CPT-4: 19287              2013

 

                                        OFFICE/OUTPATIENT VISIT EST

Diagnosis: OTITIS MEDIA NOS[ICD9: 382.9] Akanksha DRIVER [facility]            CPT-4: 35292              2013

 

                                        OFFICE/OUTPATIENT VISIT EST

Diagnosis: Perforation of ear drum[ICD9: 384.20]

Diagnosis: SINUSITIS, ACUTE[ICD9: 461.9] Akanksha DRIVER [facility]            CPT-4: 28588              05/10/2013

 

                                        (45993) OFFICE/OUTPATIENT VISIT EST

Diagnosis: Mastalgia[ICD9: 611.71] Akanksha SPENCER

[facility]                       CPT-4: 40388              2013

 

                                        (77755) OFFICE/OUTPATIENT VISIT EST

Diagnosis: HYPERLIPIDEMIA NEC/NOS[ICD9: 272.4]

Diagnosis: HYPERTENSION[ICD9: 401.9]

Diagnosis: DIZZINESS/VERTIGO[ICD9: 780.4]

Diagnosis: Hyperglycemia[ICD9: 790.29]

Diagnosis: MALAISE AND FATIGUE[ICD9: 780.79] [facility]            CPT-4: 90162              2012

 

                                        (09435) OFFICE/OUTPATIENT VISIT EST

Diagnosis: EUSTACHIAN TUBE DYSFUNCTION[ICD9: 381.81]

Diagnosis: DIZZINESS/VERTIGO[ICD9: 780.4]

Diagnosis: ABDOMINAL PAIN[ICD9: 789.00]

Diagnosis: GERD[ICD9: 530.81]

Diagnosis: CERUMEN IMPACTION[ICD9: 380.4] [employer]ha [employer]            CPT-4: 13917              2012

 

                                        OFFICE/OUTPATIENT VISIT EST

Diagnosis: ABDOMINAL PAIN[ICD9: 789.00]

Diagnosis: DYSPEPSIA[ICD9: 536.8] Akanksha LANCE [facility]                       CPT-4: 83707              10/23/2012

 

                                        (26035) OFFICE/OUTPATIENT VISIT EST

Diagnosis: ABDOMINAL PAIN[ICD9: 789.00]

Diagnosis: DYSPEPSIA[ICD9: 536.8]

Diagnosis: IBS[ICD9: 564.1] Akanksha DRIVER [facility] CPT

-

4: 21820                                10/10/2012

 

                                        OFFICE/OUTPATIENT VISIT EST

Diagnosis: DIZZINESS/VERTIGO[ICD9: 780.4]

Diagnosis: HYPOGLYCEMIA[ICD9: 251.2] Akanksha MEJIA [facility]                       CPT-4: 00148              2012

 

                                        (44975) OFFICE/OUTPATIENT VISIT EST

Diagnosis: URINARY TRACT INFECTION[ICD9: 599.0] Akanksha DRIVER [facility]            CPT-4: 74167              2012

 

                                        (10618) OFFICE/OUTPATIENT VISIT EST

Diagnosis: HYPERLIPIDEMIA NEC/NOS[ICD9: 272.4]

Diagnosis: HYPERTENSION[ICD9: 401.9]

Diagnosis: MALAISE AND FATIGUE[ICD9: 780.79]

Diagnosis: DIZZINESS/VERTIGO[ICD9: 780.4] Akanksha [employer]            CPT-4: 56281              2012

 

                                        (48094) OFFICE/OUTPATIENT VISIT EST

Diagnosis: DIZZINESS/VERTIGO[ICD9: 780.4]

Diagnosis: MALAISE AND FATIGUE[ICD9: 780.79]

Diagnosis: HYPERTENSION[ICD9: 401.9] Akanksha MEJIA [facility]                       CPT-4: 18922              2012

 

                                        OFFICE/OUTPATIENT VISIT EST

Diagnosis: LUMB/LUMBOSAC DISC DEGEN[ICD9: 722.52]

Diagnosis: Radiculopathy of leg[ICD9: 724.4]

Diagnosis: SACROILIITIS NEC[ICD9: 720.2]

Diagnosis: SPINAL ENTHESOPATHY[ICD9: 720.1] Akanksha DRIVER [facility]            CPT-4: 26614              2012

 

                                        (10056) OFFICE/OUTPATIENT VISIT EST

Diagnosis: PAIN, LOWER BACK[ICD9: 724.2]

Diagnosis: SPASM OF MUSCLE[ICD9: 728.85]

Diagnosis: SCIATICA[ICD9: 724.3]

Diagnosis: Lumbar degenerative disc disease[ICD9: 722.52] Akanksha DRIVER [facility] CPT-4: 61198              2012

 

                                        (47814) OFFICE/OUTPATIENT VISIT EST

Diagnosis: PAIN IN THORACIC SPINE[ICD9: 724.1]

Diagnosis: SPASM OF MUSCLE[ICD9: 728.85] Akanksha DRIVER [facility]            CPT-4: 34169              2012

 

                                        (68629) OFFICE/OUTPATIENT VISIT EST

Diagnosis: PAIN, LOWER BACK[ICD9: 724.2]

Diagnosis: SPASM OF MUSCLE[ICD9: 728.85]

Diagnosis: Sacroiliac dysfunction[ICD9: 739.4]

Diagnosis: Lumbar degenerative disc disease[ICD9: 722.52] Akanksha DRIVER [facility] CPT-4: 77964              2012

 

                                        OFFICE/OUTPATIENT VISIT EST

Diagnosis: MALAISE AND FATIGUE[ICD9: 780.79]

Diagnosis: HYPOTENSION[ICD9: 458.9]

Diagnosis: CAD[ICD9: 414.00] Akanksha DRIVER [facility] 

CPT-4: 03928                            2012

 

                                        OFFICE/OUTPATIENT VISIT EST

Diagnosis: SINUSITIS, ACUTE[ICD9: 461.9]

Diagnosis: PHARYNGITIS, ACUTE[ICD9: 462]

Diagnosis: CERUMEN IMPACTION[ICD9: 380.4] Akanksha [employer]            CPT-4: 19272              2011

 

                                        OFFICE/OUTPATIENT VISIT EST

Diagnosis: PAIN IN THORACIC SPINE[ICD9: 724.1]

Diagnosis: GERD[ICD9: 530.81]

Diagnosis: DIZZINESS/VERTIGO[ICD9: 780.4] Akanksha [employer]            CPT-4: 91909              2011

 

                OFFICE/OUTPATIENT VISIT EST Akanksha MEJIA [facility] CPT-

4: 78963                                2011

 

                    (55269) OFFICE/OUTPATIENT VISIT EST Akanksha CASTILLO S. [facility] 

[facility]                       CPT-4: 50443              2011

 

                                        (83701) OFFICE/OUTPATIENT VISIT, EST

Diagnosis: PAIN, LOWER BACK[ICD9: 724.2] Akanksha BAUMAN S.

 

[facility]            CPT-4: 34793              2011

 

                    (97876) OFFICE/OUTPATIENT VISIT, EST Akanksha DE LA ROSA S. [facility]

[facility]                       CPT-4: 01598              2011

 

                    (20680) OFFICE/OUTPATIENT VISIT, EST Akanksha DE LA [facility]A [facility]

[facility]                       CPT-4: 23468              2010

 

                    (35690) OFFICE/OUTPATIENT VISIT, EST Akanksha DE LA ROSA [facility]

[facility]                       CPT-4: 14583              2010

 

                    (75089) OFFICE/OUTPATIENT VISIT, EST Akanksha DE LA ROSA S. [facility]

[facility]                       CPT-4: 30335              2010

 

                    (82538) OFFICE/OUTPATIENT VISIT, EST Akanksha DE LA ROSA S. [facility]

[facility]                       CPT-4: 22192              2010

 

                    (85136) OFFICE/OUTPATIENT VISIT, EST Akanksha DE LA ROSA S. [facility]

[facility]                       CPT-4: 78573              2010

 

                    (83998) OFFICE/OUTPATIENT VISIT, EST Akanksha DE LA ROSA S. [facility]

[facility]                       CPT-4: 74677              2010







Plan of Care





             Planned Activity Notes        Codes        Status       Date

 

                          Visit Diagnosis Plan: Generalized anxiety disorder Dis

cussion: Stable

                                        ICD-9 : 300.00

ICD-10 : F41.1

                                                    2020

 

                          Visit Diagnosis Plan: Essential (primary) hypertension

 Discussion: Stable

Follow Up: 1 months

                                        ICD-9 : 401.9

ICD-10 : I10

                                                    2020

 

                          Patient Education: metoprolol tartrate- [employer] [id number] 

[email or web address]

                                        Completed           2020

 

                          Visit Diagnosis Plan: Essential (primary) hypertension

 Discussion: Improving 

with higher dose of metoprolol Recheck 2weeks

                                        ICD-9 : 401.9

ICD-10 : I10

                                                    2020

 

                          Visit Diagnosis Plan: Palpitations Discussion: Continu

e higher dose of 

metoprolol

                                        ICD-9 : 785.1

ICD-10 : R00.2

                                                    2020

 

                                        Appointment: Akanksha Drivertel:[phone]

                                        [address] [place] [address]                                              FOLLOW UP       2020

 

                                        Appointment: Akanksha Drivertel:[phone]

                                        [address] [place] [address]              2020--moved up to Tues 20 at 4pm (km)                 [place]

NCELED        2020

 

                          Visit Diagnosis Plan: Palpitations Discussion: Check C

BC, CMP, TSH

                                        ICD-9 : 785.1

ICD-10 : R00.2

                                                    2020

 

                          Visit Diagnosis Plan: Essential hypertension Discussio

n: Increase metoprolol to 

25mg q AM and 50mg po q pM Recheck 1 week

                                        ICD-9 : 401.9

ICD-10 : I10

                                                    2020

 

                                        Appointment: Akanksha Drivertel:[phone]

                                        [address] [place] [address]                                              BP CHECK        2020

 

                                        Appointment: Akanksha Drivertel:[phone]

                                        [address] [place] [address]                                              ACUTE ILLNESS   2020

 

                          Patient Education: metoprolol tartrate- [facility] [id number] 

https://www.Futurefleet.com/samplemd/resources/getResource/61/0l224039-1902-6e78-1v

                                        Completed           2020

 

                    Care Plan: X-RAY EXAM NECK SPINE 4/5VWS                     

LOINC : 91842-8

                          Pending                   2020

 

                    Care Plan: X-RAY EXAM THORAC SPINE4/>VW                     

LOINC : 30162-1

                          Pending                   2020

 

                                        Appointment: Akanksha Driverl:[phone]

                                        [address] [place], [place] [address]

[place]                                              LAB             2019

 

                                        Appointment: Akanksha Driver

WPtel:[phone]

                                        [address] [place], [place] [address]

[place]                                              INJECTION       10/22/2019

 

             Patient Education: INFLUENZA VACCINE CDC                           

Completed    10/22/2019

 

                                        Appointment: Akanksha Driver

WPtel:[phone]

                                        [address] [place], [place] [address]                                              LAB             10/11/2019

 

                          Visit Diagnosis Plan: Acute serous otitis media, left 

ear Discussion: instructed

to use flonase and claritin daily for symptom relief. discussed with patient 
that if no improvement in 2 weeks, will need to follow up with ENT for audiology
exam. instructed to call office with any other symptoms such as otalgia, 
dysphagia, fever, etc.

                                        ICD-9 : 381.01

ICD-10 : H65.02

                                                    2019

 

                                        Appointment: Nat Lozoya

                                        [address] [place] [address]                                              ACUTE ILLNESS   2019

 

                          Visit Diagnosis Plan: Urinary tract infection, site no

t specified Discussion: 

Started an old abx prescription

                                        ICD-9 : 599.0

ICD-10 : N39.0

                                                    06/10/2019

 

                          Visit Diagnosis Plan: Hypoglycemia, unspecified Discus

madeleine: Monitor BS At least 

6 small meals a day each with protein

                                        ICD-9 : 251.2

ICD-10 : E16.2

                                                    06/10/2019

 

                          Visit Diagnosis Plan: Essential (primary) hypertension

 Discussion: Stable Check 

CMP

                                        ICD-9 : 401.9

ICD-10 : I10

                                                    06/10/2019

 

                          Visit Diagnosis Plan: Other fatigue Discussion: Check 

CBC, TSH

                                        ICD-9 : 780.79

ICD-10 : R53.83

                                                    06/10/2019

 

                                        Appointment: Akanksha Driver

WPtel:[phone]

                                        [address] [place] [address]                                              FOLLOW UP       06/10/2019

 

                          Visit Diagnosis Plan: Essential (primary) hypertension

 Discussion: Stable Sees 

Dr. Clements this month

                                        ICD-9 : 401.9

ICD-10 : I10

                                                    2019

 

                          Visit Diagnosis Plan: Urinary tract infection, site no

t specified Discussion: 

Macrobid 100mg po BID for 1 week

                                        ICD-9 : 599.0

ICD-10 : N39.0

                                                    2019

 

                          Visit Diagnosis Plan: Gastro-esophageal reflux disease

 without esophagitis 

Discussion: Stable on omeprazole

Follow Up: 3 months

                                        ICD-9 : 530.81

ICD-10 : K21.9

                                                    2019

 

                                        Appointment: Akanksha Driver

WPtel:[phone]

                                        [address] [place], [place] [address]

[place]                                              FOLLOW UP       2019

 

                          Patient Education: metoprolol tartrate- [facility] [id number] 

https://www.[employer]/Futurefleet/resources/getResource/61/136z8m99-4ihp-95no-43

                                        Completed           2019

 

                                        Appointment: Akanksha Driver

WPtel:[phone]

                                        [address] [place], [place] [address]

[place]                                              CANCELED        02/15/2019

 

                          Visit Diagnosis Plan: Gastro-esophageal reflux disease

 without esophagitis 

Discussion: Continue protonix and carafate Proceed with updated EGD

                                        ICD-9 : 530.81

ICD-10 : K21.9

                                                    2018

 

                          Visit Diagnosis Plan: Epigastric pain Discussion: [facility] CT abdomen/pelvis due to

ongoing abdominal pain

                                        ICD-9 : 789.06

ICD-10 : R10.13

                                                    2018

 

                                        Appointment: Akanksha Driver

WPtel:[phone]

                                        [address] [place], [place] [address]

[place]                                              FOLLOW UP       2018

 

                    Care Plan: CT PELVIS W/O DYE                     LOINC : 361

08-9

                          Pending                   2018

 

                    Care Plan: CT ABDOMEN W/O DYE                     LOINC : 36

103-0

                          Pending                   2018

 

                    Care Plan: Referral Order                     SNOMED-CT : 30

[id number]

                          Pending                   2018

 

                                        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

art disease of native coronary artery 

without angina pectoris                 Discussion: S/P cardiac cath--doing medi

vicki management 

with imdur and metoprolol increased Has fwup with cardiology next week Discussed
cardiac rehab

                                        ICD-9 : 414.00

ICD-10 : I25.10

                                                    2018

 

                          Visit Diagnosis Plan: Generalized anxiety disorder Dis

cussion: Stable

                                        ICD-9 : 300.00

ICD-10 : F41.1

                                                    2018

 

                          Visit Diagnosis Plan: Gastro-esophageal reflux disease

 without esophagitis 

Discussion: Continue protonix at BID dosing and carafate BID

Follow Up: 2 months

                                        ICD-9 : 530.81

ICD-10 : K21.9

                                                    2018

 

                          Visit Diagnosis Plan: Essential (primary) hypertension

 Discussion: Stable

                                        ICD-9 : 401.9

ICD-10 : I10

                                                    2018

 

                                        Appointment: Akanksha Drivertel:[phone]

                                        [address] [place], [place] [id number]

                                              FOLLOW UP       2018

 

                          Visit Diagnosis Plan: Gastro-esophageal reflux disease

 without esophagitis 

Discussion: Continue protonix at BID dosing Continue carafate at q AC and HS 
dosing for 2 more weeks then decrease to BID dosing

Follow Up: 4 weeks

                                        ICD-9 : 530.81

ICD-10 : K21.9

                                                    10/02/2018

 

                          Visit Diagnosis Plan: Urinary tract infection, site no

t specified Discussion: 

Reculture urine

                                        ICD-9 : 599.0

ICD-10 : N39.0

                                                    10/02/2018

 

                          Visit Diagnosis Plan: Generalized anxiety disorder Dis

cussion: Increase xanax to

BID dosing especially with upcoming cardiac testing which is already making 
patient more anxious and worried

                                        ICD-9 : 300.00

ICD-10 : F41.1

                                                    10/02/2018

 

                          Visit Diagnosis Plan: Palpitations Discussion: Cardilo

logy going to schedule 

Lexiscan

                                        ICD-9 : 785.1

ICD-10 : R00.2

                                                    10/02/2018

 

                                        Appointment: Akanksha Driverl:[phone]

                                        [address] [place], [place] [address]

                                              FOLLOW UP       10/02/2018

 

             Patient Education: Patient Medication Summary                      

     Completed    10/02/2018

 

                                        Appointment: Akanksha Drivertel:[phone]

                                        [address] [place], [place] [id number]

[place]                                              LAB             2018

 

             Patient Education: Patient Medication Summary                      

     Completed    2018

 

                          Visit Diagnosis Plan: Epigastric pain Discussion: Cont

inue protonix and carafate

but make into a slurry Flu shot given Discussed EGD if symptoms persist

Follow Up: 2 weeks

                                        ICD-9 : 789.06

ICD-10 : R10.13

                                                    2018

 

                          Visit Diagnosis Plan: Generalized anxiety disorder Dis

cussion: Did discuss 

increased risk of benzodiazepines causing dementia but at this time will stay at
xanax 0.25mg po BID

                                        ICD-9 : 300.00

ICD-10 : F41.1

                                                    2018

 

                                        Appointment: Akanksha Driver

WPtel:[phone]

                                        [address] [place] [address]                                              FOLLOW UP       2018

 

             Patient Education: Patient Medication Summary                      

     Completed    2018

 

                          Visit Diagnosis Plan: Essential (primary) hypertension

 Discussion: Has 

hydralazine to use prn per cardiology Going to do 30 day holter monitor

                                        ICD-9 : 401.9

ICD-10 : I10

                                                    2018

 

                          Visit Diagnosis Plan: Hypoglycemia, unspecified Discus

madeleine: 6 small meals a 

day--each with protein Increase fluid intake

                                        ICD-9 : 251.2

ICD-10 : E16.2

                                                    2018

 

                          Visit Diagnosis Plan: Gastro-esophageal reflux disease

 without esophagitis 

Discussion: Change to protonix 40mg po BID

Follow Up: 1 months

                                        ICD-9 : 530.81

ICD-10 : K21.9

                                                    2018

 

                                        Appointment: Akanksha Driver

WPtel:[phone]

                                        [address] [place] [address]                                              ACUTE ILLNESS   2018

 

             Patient Education: Patient Medication Summary                      

     Completed    2018

 

                          Visit Diagnosis Plan: Dizziness and giddiness Discussi

on: blood work to be 

completed in office including cmp, cbc, troponin to rule out glucose 
intolerance, infection, dehydration. instructed patient that she needs to see 
her cardiologist sooner than oct and patient requested we contact dr. clements's 
office. will have front office make appt. patient has carotid doppler annually.

                                        ICD-9 : 780.4

ICD-10 : R42

                                                    2018

 

                                        Appointment: Nat Lozoya

                                        [address] [place] [address]                                              ACUTE ILLNESS   2018

 

             Patient Education: Patient Medication Summary                      

     Completed    2018

 

                          Visit Diagnosis Plan: Cervicalgia Discussion: Complete

 course of PT since 

symptoms improved Continue stretching exercises Notify if symptoms return or 
worsen

                                        ICD-9 : 723.1

ICD-10 : M54.2

                                                    2018

 

                                        Appointment: Akanksha Driver

WPtel:[phone]

                                        [address] [place] [address]                                              FOLLOW UP       2018

 

             Patient Education: Patient Medication Summary                      

     Completed    2018

 

                          Visit Diagnosis Plan: Hypoglycemia, unspecified Discus

madeleine: Eat something with 

protein every 2 hrs

                                        ICD-9 : 251.2

ICD-10 : E16.2

                                                    2018

 

                          Visit Diagnosis Plan: Other spondylosis with radiculop

athy, cervical region 

Discussion: Check MRI of cervical spine

                                        ICD-9 : 721.0

ICD-10 : M47.22

                                                    2018

 

                          Visit Diagnosis Plan: Vertigo of central origin, bilat

eral Discussion: Discussed

PT for vestibular therapy

                                        ICD-9 : 386.2

ICD-10 : H81.43

                                                    2018

 

                                        Appointment: Akanksha Driver

WPtel:[phone]

                                        [facility] [address]                                                              2018

 

             Patient Education: Patient Medication Summary                      

     Completed    2018

 

                    Care Plan: MRI NECK SPINE W/O DYE                     LOINC 

: 06391-8

                          Pending                   2018

 

                          Visit Diagnosis Plan: Otalgia, bilateral Discussion: k

enalog 40 mg given to 

patient im. instructed patient to monitor blood sugar at home due to risk of 
increased sugar. instructed patient to rtc on wednesday if no improvement and 
may require antibiotic.

                                        ICD-9 : 388.70

ICD-10 : H92.03

                                                    2018

 

                          Visit Diagnosis Plan: Benign paroxysmal vertigo, bilat

eral Discussion: patient 

has meclizine at home but states it makes her too tired. instructed patient to 
cut in half and take at night. if it doesn't cause too much drowsiness, 
instructed to take bid until feeling better. instructed to rtc wed if no 
improvement or worsening symptoms. instructed patient to increase fluid intake 
as well.

                                        ICD-9 : 386.11

ICD-10 : H81.13

                                                    2018

 

                                        Appointment: Nat Lozoya

                                        [address] [place] [address]                                              ACUTE ILLNESS   2018

 

             Patient Education: Patient Medication Summary                      

     Completed    2018

 

                          Visit Diagnosis Plan: Left lower quadrant pain Discuss

ion: Culture urine Notify 

if worsens

                                        ICD-9 : 789.04

ICD-10 : R10.32

                                                    2018

 

                          Visit Diagnosis Plan: Low back pain Discussion: Stretc

hes Topical aspercreme 

Tylenol 1 po TID

                                        ICD-9 : 724.2

ICD-10 : M54.5

                                                    2018

 

                          Visit Diagnosis Plan: Radiculopathy, lumbosacral regio

n Discussion: As above

                                        ICD-9 : 724.4

ICD-10 : M54.17

                                                    2018

 

                                        Appointment: Akanksha Driver

WPtel:[phone]

                                        [address] [place], [place] [id number]

                                              ACUTE ILLNESS   2018

 

             Patient Education: Patient Medication Summary                      

     Completed    2018

 

                                        Appointment: Akanksha Driver

WPtel:[phone]

                                        [address] [place], [place] [address]

[place]                                              INJECTION       10/06/2017

 

             Patient Education: Patient Medication Summary                      

     Completed    10/06/2017

 

                                        Appointment: Akanksha Driver

WPtel:[phone]

                                        [address] [place], [place] [address]

                                              LAB             2017

 

             Patient Education: Patient Medication Summary                      

     Completed    2017

 

                          Visit Diagnosis Plan: Pain in thoracic spine Discussio

n: PT has helped Continue 

stretches and walking [facility]

                                        ICD-9 : 724.1

ICD-10 : M54.6

                                                    2017

 

                          Visit Diagnosis Plan: Other intervertebral disc degene

ration, lumbar region 

Follow Up: 3 months

                                        ICD-9 : 722.52

ICD-10 : M51.36

                                                    2017

 

                                        Appointment: Akanksha Driver

WPtel:[phone]

                                        [facility]

                                              FOLLOW UP       2017

 

             Patient Education: Patient Medication Summary                      

     Completed    2017

 

                          Visit Diagnosis Plan: Low back pain Discussion: Start 

PT for low back- Seems 

like abdominal pain is radiating from low back

Follow Up: 5 weeks

                                        ICD-9 : 724.2

ICD-10 : M54.5

                                                    2017

 

                          Visit Diagnosis Plan: Left lower quadrant pain Discuss

ion: Had CT scan of 

abdomen/pelvis in 2017 and normal colonoscopy in 2015

                                        ICD-9 : 789.04

ICD-10 : R10.32

                                                    2017

 

                                        Appointment: Akanksha Driver

WPtel:[phone]

                                        [address] [place], [place] [address]

                                              ACUTE ILLNESS   2017

 

             Patient Education: Patient Medication Summary                      

     Completed    2017

 

                                        Appointment: Akanksha Driver

WPtel:[phone]

                                        [address] [place], [place] [address]                                              LAB             2017

 

             Patient Education: Patient Medication Summary                      

     Completed    2017

 

                          Visit Diagnosis Plan: Mixed hyperlipidemia Discussion:

 Check lipids

                                        ICD-9 : 272.4

ICD-10 : E78.2

                                                    2017

 

                          Visit Diagnosis Plan: Impaired fasting glucose Discuss

ion: Return in AM for CMP,

HbA1C Accuchecks daily Diet/Exercise discussed at length

                                        ICD-9 : 790.29

ICD-10 : R73.01

                                                    2017

 

                          Visit Diagnosis Plan: Other fatigue Discussion: Check 

CBC, TSH

                                        ICD-9 : 780.79

ICD-10 : R53.83

                                                    2017

 

                          Visit Diagnosis Plan: Mastodynia Discussion: Check Jace

ateral diagnostic 

mammogram with US

                                        ICD-9 : 611.71

ICD-10 : N64.4

                                                    2017

 

                                        Appointment: Akanksha Driver

WPtel:[phone]

                                        [address] [place], [place] [address]              3/7 confirmed `sl                 ACUTE ILLNESS   2017

 

             Patient Education: Patient Medication Summary                      

     Completed    2017

 

                    Care Plan: MAMMOGRAM SCREENING                     LOINC : 2

6347-5

                          Pending                   2017

 

                          Visit Diagnosis Plan: Acute upper respiratory infectio

n, unspecified Discussion:

Exam is reassuring Likely viral illness Supportive care reviewed and encouraged 
Follow up PRN

                                        ICD-9 : 465.9

ICD-10 : J06.9

                                                    2017

 

                                        Appointment: Luba Stevenson 

                                        [address] [place], [place] [id number]                                              ACUTE ILLNESS   2017

 

             Patient Education: Patient Medication Summary                      

     Completed    2017

 

                          Visit Plan:               Supportive care. Rest, Fluid

s, Tylenol/Motrin prn fever or 

bodyaches. Notify if worsening symptoms.

                                                            2016

 

                                        Appointment: Akanksha Driver

WPtel:[phone]

                                        [address] [place] [address]                                              ACUTE ILLNESS   2016

 

             Patient Education: Patient Medication Summary                      

     Completed    2016

 

                                        Appointment: Akanksha Driver

WPtel:[phone]

                                        [address] [place], [place] [address]

[place]                                              INJECTION       10/07/2016

 

             Patient Education: Patient Medication Summary                      

     Completed    10/07/2016

 

                                        Appointment: Akanksha Driver

WPtel:[phone]

                                        [address] [place], [place] [id number]

US                                              LAB             2016

 

             Patient Education: Patient Medication Summary                      

     Completed    2016

 

                          Visit Plan:               Add miralax daily Use daily 

probiotic and metamucil and push fluids

Notify if worsens/persists

                                                            2016

 

                                        Appointment: Akanksha Driver

WPtel:[phone]

                                        [address] [place] [address]              16 confirmed ~sl                 ACUTE ILLNESS   2016

 

             Patient Education: Patient Medication Summary                      

     Completed    2016

 

                          Visit Plan:               No NSAIDs Kidney function di

scussed Discussed hydration Monitor lab

every 4months so will check CMP, HbA1C next month

                                                            2016

 

                                        Appointment: Akanksha Driver

WPtel:[phone]

                                        [address] [place] [address]              03/15 confirmed ~sl                 ACUTE ILLNESS   2016

 

             Patient Education: Patient Medication Summary                      

     Completed    2016

 

                          Visit Plan:               Vestibular exercises Refill 

flonase Strict low Na diet--discussed 

that needs to read labels Rx for walker with chair given due to muscle 
weakness/unsteadiness Has carotids and abdominal aorta and legs checked in 
January Check Chem 7

                                                            2015

 

                                        Appointment: Akanksha Driver

WPtel:[phone]

                                        [address] [place] [address]              12/15/15 appt confirmed cn                 FOLLOW UP       

 

             Patient Education: Patient Medication Summary                      

     Completed    2015

 

             Patient Education: Vertigo                           Completed    1

2015

 

                                        Appointment: Akanksha Driver

WPtel:[phone]

                                        [address] [place], [place] [id number]

US                                              INJECTION       10/16/2015

 

             Patient Education: Patient Medication Summary                      

     Completed    10/16/2015

 

                                        Appointment: Akanksha Driver

WPtel:[phone]

                                        [address] [place], [place] [id number]

US                                              UA              09/15/2015

 

             Patient Education: Patient Medication Summary                      

     Completed    09/15/2015

 

                                        Referral: Landon De La Torre

WPtel:[phone]

#1 [facility] Uma Vásquez

[id number]

[place]              Referral                        Completed       2015

 

                                        Appointment: Akanksha Driver

WPtel:[phone]

                                        [address] [place] [address]                                              LAB             09/10/2015

 

             Patient Education: Patient Medication Summary                      

     Completed    09/10/2015

 

                          Visit Plan:               Check CMP, CBC, TSH, Free T4

, B12, Lipids, HbA1C Discussed 6 small 

meals a day each with protein Would likely benefit from antidepressant Update 
colonoscopy

                                                            2015

 

                                        Appointment: Akanksha Driver

WPtel:[phone]

                                        [address] [place] [address]              9/8/15 confirmed                 ACUTE ILLNESS   2015

 

             Patient Education: Patient Medication Summary                      

     Completed    2015

 

                          Visit Plan:               Cerumen flush with warm wate

r and peroxide - good results Resume 

Nasonex nasal spray daily Recommended Debrox earwax removal drops

                                                            2015

 

                                        Appointment: Bertha Mon

WPtel:[phone]

                                        [address] [place] [address]                                              ACUTE ILLNESS   2015

 

             Patient Education: Patient Medication Summary                      

     Completed    2015

 

                          Visit Plan:               Continue aspirin daily Add m

eloxicam for 1week Elevate and Ice Call

on 2015

 

                                        Appointment: Akanksha Driver

WPtel:[phone]

                                        [address] [place] [address]                                              ACUTE ILLNESS   2015

 

             Patient Education: Patient Medication Summary                      

     Completed    2015

 

                          Visit Plan:               Been using baclofen at [facility] and has helped some PT for next 

2-4weeks

                                                            2015

 

                                        Appointment: Akanksha Driver

WPtel:[phone]

                                        [address] [place], [place] [id number] [place] [address] Follow Up 2015

 

             Patient Education: Patient Medication Summary                      

     Completed    2015

 

                                        Appointment: Akanksha Driver

WPtel:[phone]

                                        [address] [place], [place] [id number]                                              LAB             2015

 

                                        Appointment: Akanksha Driver

WPtel:[phone]

                                        [address] [place], [place] [id number]              feeling better, weather -                 FOLLOW UP       

 

                                        Referral: Landon De La Torre

WPtel:[phone]

#1 [facility] [place]

Henri LEE

[id number]

US              Referral                        Appointment Requested 2015

 

                          Visit Plan:               Continue exercise and curren

t meds See surgery for removal of right

arm lesion

                                                            2015

 

                                        Appointment: Akanksha Driver

WPtel:[phone]

                                        [address] [place], [place] [id number]                                              FOLLOW UP       2015

 

             Patient Education: Patient Medication Summary                      

     Completed    2015

 

                                        Appointment: Akanksha Driver

WPtel:[phone]

                                        [address] [place], [place] [id number]                                              INJECTION       10/17/2014

 

             Patient Education: Patient Medication Summary                      

     Completed    10/17/2014

 

                                        Appointment: Bertha Mon

WPtel:[phone]

                                        [address] [place], [place] [id number]                                              ACUTE ILLNESS   2014

 

             Patient Education: Patient Medication Summary                      

     Completed    2014

 

                          Visit Plan:               Discussed that likely lumbar

 etiology for leg weakness Will change 

amlodopine to low dose dyazide and see if helps legs and inner ear

                                                            2014

 

                                        Appointment: Akanksha Driver

WPtel:[phone]

                                        [address] [place], [place] [id number]

                                              FOLLOW UP       2014

 

             Patient Education: Patient Medication Summary                      

     Completed    2014

 

                          Visit Plan:               Ceftin and continue claritin

/flonase Decrease amlodopine to 2.5mg q

HS until fwup with Card due to weakness

                                                            2014

 

                                        Appointment: Akanksha Driver

WPtel:[phone]

                                        [address] [place] [address]                                              ACUTE ILLNESS   2014

 

             Patient Education: Patient Medication Summary                      

     Completed    2014

 

                                        Appointment: Akanksha Driver

WPtel:[phone]

                                        [address] [place] [address]                                              LAB             2014

 

             Patient Education: Patient Medication Summary                      

     Completed    2014

 

                                        Appointment: Bertha Mon

WPtel:[phone]

                                        [address] [place] [address]                                              ACUTE ILLNESS   2014

 

             Patient Education: Patient Medication Summary                      

     Completed    2014

 

                          Visit Plan:               Supportive care. Rest, Fluid

s, Tylenol prn fever or bodyaches. 

Notify if worsening symptoms. Ceftin

                                                            10/15/2013

 

                                        Appointment: Bertha Mon

WPtel:[phone]

                                        [address] [place] [address]                                              ACUTE ILLNESS   10/15/2013

 

             Patient Education: Patient Medication Summary                      

     Completed    10/15/2013

 

                                        Appointment: Akanksha Driver

WPtel:[phone]

                                        [address] [place] [address]                                              BP CHECK        2013

 

             Patient Education: Patient Medication Summary                      

     Completed    2013

 

                                        Appointment: Akanksha Driver

WPtel:[phone]

                                        [address] [place] [address]                                              ER Follow UP    2013

 

             Patient Education: Patient Medication Summary                      

     Completed    2013

 

                          Visit Plan:               Restart flonase BID Use mecl

izine 25mg q HS Vestibular exercises 

Claritin 10mg q AM See ENT if doesn't resolve

                                                            2013

 

                                        Appointment: Akanksha Driver

WPtel:[phone]

                                        [address] [place] [address]                                              ACUTE ILLNESS   2013

 

             Patient Education: Patient Medication Summary                      

     Completed    2013

 

                          Visit Plan:               Will continue to observe

                                                            2013

 

                                        Appointment: Akanksha Driver

WPtel:[phone]

                                        [address] [place] [address]                                              FOLLOW UP       2013

 

             Patient Education: Patient Medication Summary                      

     Completed    2013

 

                          Visit Plan:               ERx for Augmentin 875mg q12 

x 10 days and Floxin otic drops 5 gtts 

AD x7days Sample of Nasonex per pt request Discussed treatment (nasal saline, 
salt water gargles, keeping right ear clean and dry, for worsening go to UC on 
weekend, Tylenol/ibuprofen, etc.) RTC 2 weeks for ear recheck.

                                                            05/10/2013

 

                                        Appointment: Jill Jean 

WPtel:[phone]

                                        [address] [place] [address]                                              ACUTE ILLNESS   05/10/2013

 

             Patient Education: Patient Medication Summary                      

     Completed    05/10/2013

 

                          Visit Plan:               Decrease caffeine intake Marina

ck Bilateral Mammogram with US of left 

breast

                                                            2013

 

                                        Appointment: Akanksha Driver

WPtel:[phone]

                                        [address] [place] [address]                                              ACUTE ILLNESS   2013

 

             Patient Education: Patient Medication Summary                      

     Completed    2013

 

                                        Appointment: Kate Hall

WPtel:[phone]

                                        [address] [place] [address]              appt scheduled                  ACUTE ILLNESS   2013

 

                                        Appointment: Akanksha Driver

WPtel:[phone]

                                        [address] [place] [address]

US                                              LAB             2012

 

             Patient Education: Patient Medication Summary                      

     Completed    2012

 

                          Visit Plan:               Continue off carafate and se

e how does Continue probiotic Add 

Vestibular exercises and use meclizine prn Continue Nasonex Check fasting lab 
including CMP, Lipids, CBC, TSH, Free T4, HbA1C

                                                            2012

 

                                        Appointment: Akanksha Driver

WPtel:[phone]

                                        [address] [place] [address]                                              FOLLOW UP       2012

 

             Patient Education: Patient Medication Summary                      

     Completed    2012

 

                          Visit Plan:               Continue carafate for 4more 

weeks at current dose then decrease to 

BID for 2wks then q HS

                                                            10/23/2012

 

                                        Appointment: Akanksha Drivertel:[phone]

                                        [address] [place] [address]                                              FOLLOW UP       10/23/2012

 

             Patient Education: Patient Medication Summary                      

     Completed    10/23/2012

 

                          Visit Plan:               Continue omeprazole Add Ellyn

fate 1gm po q AC Add daily probiotic

                                                            10/10/2012

 

                                        Appointment: Akanksha Driver

WPtel:[phone]

                                        [address] [place] [address]                                              ACUTE ILLNESS   10/10/2012

 

             Patient Education: Patient Medication Summary                      

     Completed    10/10/2012

 

                                        Appointment: Akanksha Drivertel:[phone]

                                        [address] [place] [address]

US                                              INJECTION       2012

 

             Patient Education: Patient Medication Summary                      

     Completed    2012

 

                          Visit Plan:               Diabetic Diet Accuchecks BID

 alternating times Hold onglyza and 

Januvia for now and continue diet and exercise and weight loss and moniter BS 
Discussed hypoglycemia and snack of peanut butter and crackers with juice or 
milk

                                                            2012

 

                                        Appointment: Akanksha Drivertel:[phone]

                                        [address] [place] [address]                                              WORK IN         2012

 

             Patient Education: Patient Medication Summary                      

     Completed    2012

 

                                        Appointment: Akanksha Drivertel:[phone]

                                        [address] [place], [place] [id number]                                              UA              2012

 

             Patient Education: Patient Medication Summary                      

     Completed    2012

 

                                        Appointment: Akanksha Drivertel:[phone]

                                        [address] [place], [place] [address]                                              LAB             2012

 

             Patient Education: Patient Medication Summary                      

     Completed    2012

 

                                        Appointment: Akanksha Drivertel:[phone]

                                        [address] [place], [place] [address]                                              ACUTE ILLNESS   2012

 

             Patient Education: Patient Medication Summary                      

     Completed    2012

 

                          Visit Plan:               Injection to Right SI joint 

as above Pt will call in 3 days on pain

Has PT starting in 2wks.

                                                            2012

 

                                        Appointment: Akanksha Drivertel:[phone]

                                        [address] [place] [address]                                              ACUTE ILLNESS   2012

 

             Patient Education: Patient Medication Summary                      

     Completed    2012

 

                          Visit Plan:               OMT done Start PT May need u

pdated MRI if need to consider epidural

Prednisone

                                                            2012

 

                                        Appointment: Akanksha Driver

WPtel:[phone]

                                        [address] [place] [address]                                              OMT             2012

 

             Patient Education: Patient Medication Summary                      

     Completed    2012

 

                          Visit Plan:               Flexeril and Vimovo OMT done

 Daily stretches

                                                            2012

 

                                        Appointment: Akanksha Driver

WPtel:[phone]

                                        [address] [place] [address]                                              ACUTE ILLNESS   2012

 

             Patient Education: Patient Medication Summary                      

     Completed    2012

 

                          Visit Plan:               OMT done Daily stretches Vinod

st heat or biofreeze Right SI joint 

injection Call in 1week Vimovo BID

                                                            2012

 

                                        Appointment: Akanksha Drivertel:[phone]

                                        [address] [place] [address]                                              ACUTE ILLNESS   2012

 

             Patient Education: Patient Medication Summary                      

     Completed    2012

 

                                        Appointment: Akanksha Drivertel:[phone]

                                        [address] [place] [address]

US                                              LAB             2012

 

             Patient Education: Patient Medication Summary                      

     Completed    2012

 

                          Visit Plan:               Decrease amlodopine to 1.25m

g daily Schedule with Cardiology 

Fasting lab in AM

                                                            2012

 

                                        Appointment: Akanksha Drivertel:[phone]

                                        [address] [place] [address]                                              ACUTE ILLNESS   2012

 

             Patient Education: Patient Medication Summary                      

     Completed    2012

 

                          Visit Plan:               Saline nasal flushes prn. Ty

lenol/Motrin prn headache. Notify if 

persists/symptoms worsening. Cerumen removal from ears as above

                                                            2011

 

                                        Appointment: Akanksha Driver

WPtel:[phone]

                                        [address] [place] [address]                                              ACUTE ILLNESS   2011

 

             Patient Education: Patient Medication Summary                      

     Completed    2011

 

                                        Appointment: Akanksha Driver

WPtel:[phone]

                                        [address] [place] [address]

US                                              INJECTION       10/05/2011

 

             Patient Education: Patient Medication Summary                      

     Completed    10/05/2011

 

                          Visit Plan:               Continue vimovo for 1more we

ek Increase Omeprazole to BID for 1mo 

then resume QD if stomach improved Vestibular exercises with meclizine q HS for 
next week

                                                            2011

 

                                        Appointment: Akanksha Driver

WPtel:[phone]

                                        [address] [place] [address]                                              FOLLOW UP       2011

 

             Patient Education: Patient Medication Summary                      

     Completed    2011

 

                                        Appointment: Akanksha Driver

WPtel:[phone]

                                        [address] [place] [address]                                              ACUTE ILLNESS   2011

 

             Patient Education: Patient Medication Summary                      

     Completed    2011

 

                                        Appointment: Akanksha Driver

WPtel:[phone]

                                        [address] [place] [address]                                              LAB             2011

 

             Patient Education: Patient Medication Summary                      

     Completed    2011

 

                          Visit Plan:               Saline nasal flushes prn. Ty

lenol/Motrin prn headache. Notify if 

persists/symptoms worsening. Add Veramyst Increase Omeprazole to 20mg po BID

                                                            2011

 

                                        Appointment: Akanksha Driver

WPtel:[phone]

                                        [address] [place] [address]                                              ACUTE ILLNESS   2011

 

             Patient Education: Patient Medication Summary                      

     Completed    2011

 

                                        Appointment: Akanksha Driver

WPtel:[phone]

                                        [address] [place] [address]                                              FOLLOW UP       2011

 

             Patient Education: Patient Medication Summary                      

     Completed    2011

 

                                        Appointment: Akanksha Driver

WPtel:[phone]

                                        [address] [place] [address]                                              ACUTE ILLNESS   2011

 

             Patient Education: Patient Medication Summary                      

     Completed    2011

 

                          Visit Plan:               Nasocourt sample given. Pt. 

will notify if symptoms are worse on 

Monday.

                                                            2010

 

                                        Appointment: Kate Hall

WPtel:[phone]

                                        [address] [place] [address]                                              ACUTE ILLNESS   2010

 

             Patient Education: Patient Medication Summary                      

     Completed    2010

 

                                        Appointment: Akanksha Driver

WPtel:[phone]

                                        [address] [place] [address]

[place]                                              INJECTION       10/06/2010

 

             Patient Education: Patient Medication Summary                      

     Completed    10/06/2010

 

                                        Appointment: Akanksha Driver

WPtel:[phone]

                                        [address] [place] [address]                                              FOLLOW UP       2010

 

             Patient Education: Patient Medication Summary                      

     Completed    2010

 

             Patient Education: Lexapro                           Completed    0

2010

 

                          Visit Plan:               May proceed with hiatal mykel

ia repair per Card. so will contact Dr. Cash's office to proceed with surgery Trial of Lexapro 5mg QD plus use 
xanax prn

                                                            2010

 

                                        Appointment: Akanksha Driver

WPtel:[phone]

                                        [address] [place] [address]                                              FOLLOW UP       2010

 

             Patient Education: Patient Medication Summary                      

     Completed    2010

 

                          Visit Plan:               B12 given Cont oral B12 and 

iron Fwup 1mo for B12 Proceed with 

hiatal hernia repair once card clearance

                                                            2010

 

                                        Appointment: Akanksha Driver

WPtel:[phone]

                                        [address] [place] [address]                                              FOLLOW UP       2010

 

             Patient Education: Patient Medication Summary                      

     Completed    2010

 

                                        Appointment: Akanksha Driver

WPtel:[phone]

                                        [address] [place] [address]                                              FOLLOW UP       2010

 

             Patient Education: Patient Medication Summary                      

     Completed    2010

 

                                        Appointment: Akanksha Driver

WPtel:[phone]

                                        [address] [facility]

[place]                                              LAB             2010

 

             Patient Education: Patient Medication Summary                      

     Completed    2010

 

                          Visit Plan:               Check fasting lab in AM--CMP

,Lipids, CBC, Vit D, TSH,FreeT4, B12

                                                            2010

 

                                        Appointment: Akanksha Driver

WPtel:[phone]

                                        [address] [facility]

                                              FOLLOW UP       2010

 

             Patient Education: Patient Medication Summary                      

     Completed    2010

 

                                        Referral: Landon De La Torre

WPtel:[phone]

[address] [facility]

              Referral                        Appointment Requested  

 

                                        Referral: Landon De La Torre

WPtel:[phone]

[address] [facility]

              Referral                        Initiated        







Instructions





                                        Comment

 

                                        . Supportive care.  Rest, Fluids, Tyleno

l/Motrin prn fever or bodyaches.  Notify

if worsening symptoms.



 

                                        . Add miralax daily

Use daily probiotic and metamucil and push fluids

Notify if worsens/persists

 

                                        . No NSAIDs

Kidney function discussed

Discussed hydration 

Monitor lab every 4months so will check CMP, HbA1C next month

 

                                        . Vestibular exercises

Refill flonase

Strict low Na diet--discussed that needs to read labels

Rx for walker with chair given due to muscle weakness/unsteadiness

Has carotids and abdominal aorta and legs checked in January

Check Chem 7



 

                                        . Check CMP, CBC, TSH, Free T4, B12, Lip

ids, HbA1C

Discussed 6 small meals a day each with protein

Would likely benefit from antidepressant 

Update colonoscopy

 

                                        . Cerumen flush with warm water and tyler

xide - good results 

Resume Nasonex nasal spray daily

Recommended Debrox earwax removal drops 

 

                                        . Continue aspirin daily

Add meloxicam for 1week

Elevate and Ice

Call on Monday

 

                                        . Been using baclofen at bedtime and has

 helped some

PT for next 2-4weeks



 

                                        . Continue exercise and current meds

See surgery for removal of right arm lesion

 

                                        . Discussed that likely lumbar etiology 

for leg weakness

Will change amlodopine to low dose dyazide and see if helps legs and inner ear

 

                                        . Ceftin and continue claritin/flonase

Decrease amlodopine to 2.5mg q HS until fwup with Card due to weakness

 

                                        .  Supportive care.  Rest, Fluids, Tylen

ol prn fever or bodyaches.  Notify if 

worsening symptoms.

Ceftin

 

                                        . Restart flonase BID

Use meclizine 25mg q HS

Vestibular exercises

Claritin 10mg q AM

See ENT if doesn't resolve

 

                                        . Will continue to observe



 

                                        . ERx for Augmentin 875mg q12 x 10 days 

and Floxin otic drops 5 gtts AD x7days

Sample of Nasonex per pt request

Discussed treatment (nasal saline, salt water gargles, keeping right ear clean 
and dry, for worsening go to UC on weekend, Tylenol/ibuprofen, etc.)

RTC 2 weeks for ear recheck.

 

                                        . Decrease caffeine intake

Check Bilateral Mammogram with US of left breast

 

                                        Left ear flushed with warm water and per

oxide with ear syringe and then last 

removed with currette--peroxide drops instilled.  Tolerated well, no 
complications, TM intact.. Continue off carafate and see how does

Continue probiotic

Add Vestibular exercises and use meclizine prn

Continue Nasonex

Check fasting lab including CMP, Lipids, CBC, TSH, Free T4, HbA1C

 

                                        . Continue carafate for 4more weeks at c

urrent dose then decrease to BID for 

2wks then q HS

 

                                        . Continue omeprazole

Add Carafate 1gm po q AC

Add daily probiotic



 

                                        . Diabetic Diet

Accuchecks BID alternating times

Hold onglyza and Januvia for now and continue diet and exercise and weight loss 
and moniter BS

Discussed hypoglycemia and snack of peanut butter and crackers with juice or 
milk

 

                                        Informed Consent obtainded.  Right SI yasir

int cleansed with alcohol and betadine 

and injected with 3cc 1%l lidocaine with 40mg depomedrol and 40mg kenalog, 
tolerated well with no complications, neosporin and bandage applied. Injection 
to Right SI joint as above

Pt will call in 3 days on pain

Has PT starting in 2wks.

 

                                        . OMT done

Start PT

May need updated MRI if need to consider epidural

Prednisone

 

                                        . Flexeril and Vimovo

OMT done

Daily stretches

 

                                        Right SI joint cleansed with alchohol an

d betadine and injected with 3cc 1% 

lidocaine with 40mg depomedrol and 40mg kenalog, tolerated well with no 
complications, neosporin and bandage applied. OMT done

Daily stretches

Moist heat or biofreeze

Right SI joint injection

Call in 1week

Vimovo BID

 

                                        . Decrease amlodopine to 1.25mg daily

Schedule with Cardiology

Fasting lab in AM

 

                                        .  Saline nasal flushes prn. Tylenol/Mot

rin prn headache.  Notify if 

persists/symptoms worsening.

Cerumen removal from ears as above



 

                                        . Continue vimovo for 1more week

Increase Omeprazole to BID for 1mo then resume QD if stomach improved

Vestibular exercises with meclizine q HS for next week

 

                                        . Saline nasal flushes prn. Tylenol/Motr

in prn headache.  Notify if 

persists/symptoms worsening.

Add Veramyst

Increase Omeprazole to 20mg po BID

 

                                        . Nasocourt sample given.  Pt. will noti

fy if symptoms are worse on Monday. 

 

                                        . May proceed with hiatal hernia repair 

per Card. so will contact Dr. Cash's office to proceed with surgery



Trial of Lexapro 5mg QD plus use xanax prn

 

                                        . B12 given

Cont oral B12 and iron

Fwup 1mo for B12

Proceed with hiatal hernia repair once card clearance

 

                                        . Check fasting lab in AM--CMP,Lipids, C

BC, Vit D, TSH,FreeT4, B12







Medical Equipment

No Medical Equipment data



Health Concerns Section

Health Concerns data not found



Goals Section

Goals data not found



Interventions Section

Interventions data not found



Health Status Evaluations/Outcomes Section

Health Status Evaluations/Outcomes data not found



Advance Directives

No Advance Directive data

## 2020-02-19 NOTE — XMS REPORT
CCD document using C-CDA

                             Created on: 2020



Leilani Ramírez

External Reference #: [id number]

: 1940

Sex: Female



Demographics





                          Address                   [address] [place]  [address]

 

                          Home Phone                [phone]

 

                          Preferred Language        Unknown

 

                          Marital Status            

 

                          Episcopal Affiliation     Unknown

 

                          Race                      White

 

                          Ethnic Group              Not  or 





Author





                          Author                    Leilani Driver D.O.

 

                          Organization              [facility]

 

                          Address                   [address] [place]  [address]



 

                          Phone                     [phone]







Care Team Providers





                    Care Team Member Name Role                Phone

 

                    Akanksha Driver D.O. PP                  Unavailable

 

                          CCM                       Unavailable







Summary Purpose

Interface Exchange



Insurance Providers





             Payer name   Policy type / Coverage type Covered party ID Effective

 Begin Date 

Effective End Date

 

             [employer] [place] B [id number]  [id number]   

  Unknown

 

             [employer] Senior Supplemental Medicare Part B [id number]   [id number]    

 Unknown







Family history





Father



                          Diagnosis                 Age At Onset

 

                          Heart disease             Unknown







Mother



                          Diagnosis                 Age At Onset

 

                          Heart disease             Unknown

 

                          Cancer                    Unknown







Social History





                Social History Element Codes           Description     Effective

 Dates

 

                Tobacco history SNOMED CT: 337973283 Never smoker    2011

 

                Marital status  Unknown         Single          2010

 

                Number of children Unknown         9               2010







Allergies, Adverse Reactions, Alerts





           Substance  Reaction   Codes      Entered Date Inactivated Date Status

 

           _                     Unknown    2019 No Inactive Date Active

 

           * NO KNOWN FOOD ALLERGIES            Unknown    2010 No Inactiv

e Date Active

 

           _                     Unknown    2010 No Inactive Date Active

 

           QUINIDINE-QUININE ANALOGUES hives,     Unknown    2010 No Inact

diamante Date Active







Problems





                Condition       Codes           Effective Dates Condition Status

 

                          Essential (primary) hypertension ICD-9: 401.9

ICD-10: I10               2014                Active

 

                          Generalized anxiety disorder ICD-9: 300.00

ICD-10: F41.1             2018                Active

 

                          Palpitations              ICD-9: 785.1

ICD-10: R00.2             10/02/2018                Active

 

                          Stress reaction           ICD-9: 308.9

ICD-10: F43.0             2020                Active

 

                          Mixed hyperlipidemia      ICD-9: 272.4

ICD-10: E78.2             2019                Active

 

                          FLU VACCINE               ICD-9: V04.81

ICD-10: Z23               2012                Active

 

                          Acute serous otitis media, left ear ICD-9: 381.01

ICD-10: H65.02            2019                Active

 

                          Coronary atherosclerosis due to calcified coronary les

ion ICD-9: 414.00

ICD-10: I25.84            2018                Active

 

                          Hypoglycemia, unspecified ICD-9: 251.2

ICD-10: E16.2             2017                Active

 

                          Other fatigue             ICD-9: 780.79

ICD-10: R53.83            2017                Active

 

                          Urinary tract infection, site not specified ICD-9: 599

.0

ICD-10: N39.0             10/02/2018                Active

 

                          Gastro-esophageal reflux disease without esophagitis I

CD-9: 530.81

ICD-10: K21.9             2018                Active

 

                          Epigastric pain           ICD-9: 789.06

ICD-10: R10.13            2018                Active

 

                          Atherosclerotic heart disease of native coronary arter

y without angina pectoris 

ICD-9: 414.00

ICD-10: I25.10            2018                Active

 

                          Dizziness and giddiness   ICD-9: 780.4

ICD-10: R42               2014                Active

 

                          Occlusion and stenosis of bilateral carotid arteries I

CD-9: 433.10

ICD-10: I65.23            2018                Active

 

                          Cervicalgia               ICD-9: 723.1

ICD-10: M54.2             2018                Active

 

                          Other spondylosis with radiculopathy, cervical region 

ICD-9: 721.0

ICD-10: M47.22            2018                Active

 

                          Cervicocranial syndrome   ICD-9: 723.2

ICD-10: M53.0             2018                Active

 

                          Vertigo of central origin, bilateral ICD-9: 386.2

ICD-10: H81.43            2018                Active

 

                          Benign paroxysmal vertigo, bilateral ICD-9: 386.11

ICD-10: H81.13            2018                Active

 

                          Otalgia, bilateral        ICD-9: 388.70

ICD-10: H92.03            2018                Active

 

                          Left lower quadrant pain  ICD-9: 789.04

ICD-10: R10.32            2017                Active

 

                          Low back pain             ICD-9: 724.2

ICD-10: M54.5             2017                Active

 

                          Radiculopathy, lumbosacral region ICD-9: 724.4

ICD-10: M54.17            2018                Active

 

                          Impaired fasting glucose  ICD-9: 790.29

ICD-10: R73.01            2012                Active

 

                          Other intervertebral disc degeneration, lumbar region 

ICD-9: 722.52

ICD-10: M51.36            2017                Active

 

                          Pain in thoracic spine    ICD-9: 724.1

ICD-10: M54.6             2017                Active

 

                          Mastodynia                ICD-9: 611.71

ICD-10: N64.4             2017                Active

 

                          Acute upper respiratory infection, unspecified ICD-9: 

465.9

ICD-10: J06.9             2017                Active

 

                          Acute mastoiditis without complications, right ear ICD

-9: 383.00

ICD-10: H70.001           2016                Active

 

                          Constipation, unspecified ICD-9: 564.00

ICD-10: K59.00            2016                Active

 

                          Chronic kidney disease, stage 1 ICD-9: 585.1

ICD-10: N18.1             03/15/2016                Active

 

                          History of falling        ICD-9: V15.88

ICD-10: Z91.81            12/15/2015                Active

 

                          Muscle weakness (generalized) ICD-9: 780.79

ICD-10: M62.81            2015                Active

 

                Acute renal failure ICD-9: 584.9    2015      Active

 

                POLYURIA        ICD-9: 788.42   2015      Active

 

                CAD             ICD-9: 414.00   09/10/2015      Active

 

                Hyperglycemia   ICD-9: 790.29   2012      Active

 

                HYPERLIPIDEMIA NEC/NOS ICD-9: 272.4    09/10/2015      Active

 

                ABDOMINAL PAIN  ICD-9: 789.00   10/10/2012      Active

 

                Grieving        ICD-9: 309.0    2015      Active

 

                HYPOGLYCEMIA    ICD-9: 251.2    2012      Active

 

                MALAISE AND FATIGUE ICD-9: 780.79   2015      Active

 

                CERUMEN IMPACTION ICD-9: 380.4    2015      Active

 

                Leg pain        ICD-9: 729.5    2015      Active

 

                SUPERFIC PHLEBITIS-LEG ICD-9: 451.0    2015      Active

 

                SPASM OF MUSCLE ICD-9: 728.85   2015      Active

 

                Thoracic back pain ICD-9: 724.1    2015      Active

 

                Benign positional vertigo ICD-9: 386.11   2015      Active

 

                Suspicious nevus ICD-9: 238.2    2015      Active

 

                EUSTACHIAN TUBE DYSFUNCTION ICD-9: 381.81   2014      Acti

ve

 

                SINUSITIS, ACUTE ICD-9: 461.9    2014      Active

 

                DIZZINESS/VERTIGO ICD-9: 780.4    2014      Active

 

                HYPERTENSION    ICD-9: 401.9    2014      Active

 

                URI, ACUTE      ICD-9: 465.9    10/15/2013      Active

 

                CEPHALGIA       ICD-9: 784.0    2013      Active

 

                OTITIS MEDIA NOS ICD-9: 382.9    2013      Active

 

                Perforation of ear drum ICD-9: 384.20   05/10/2013      Active

 

                Mastalgia       ICD-9: 611.71   2013      Active

 

                DYSPEPSIA       ICD-9: 536.8    10/10/2012      Active

 

                IBS             ICD-9: 564.1    10/10/2012      Active

 

                FLU VACCINE     ICD-9: V04.81   2012      Active

 

                Radiculopathy of leg ICD-9: 724.4    2012      Active

 

                SCIATICA        ICD-9: 724.3    2012      Active

 

                Lumbar degenerative disc disease ICD-9: 722.52   2012     

 Active

 

                Sacroiliac dysfunction ICD-9: 739.4    2012      Active

 

                Hypertension    Unknown         2012      Active

 

                PAIN, LOWER BACK ICD-9: 724.2    2011      Active

 

                SPINAL ENTHESOPATHY ICD-9: 720.1    2011      Active

 

                Hypotension     ICD-9: 458.9    2011      Active

 

                SACROILIITIS NEC ICD-9: 720.2    2011      Active

 

                PHARYNGITIS, ACUTE ICD-9: 462      2010      Active

 

                URINARY TRACT INFECTION ICD-9: 599.0    2010      Active

 

                Heat exhaustion ICD-9: 992.5    2010      Active

 

                Esophagitis     ICD-9: 530.10   2010      Active

 

                Gastritis       ICD-9: 535.50   2010      Active

 

                GERD            ICD-9: 530.81   2010      Active

 

                Hiatal hernia   ICD-9: 553.3    2010      Active

 

                B12 DEFIC ANEMIA NEC ICD-9: 281.1    2010      Active

 

                Anxiety         Unknown         2010      Active

 

                Heart disease   Unknown         2010      Active

 

                Hyperlipidemia  Unknown         2010      Active

 

                ANXIETY STATE NOS ICD-9: 300.00   2010      Active

 

                DEPRESSIVE DISORDER NEC ICD-9: 311      2010      Active







Medications





          Medication Codes     Instructions Start Date Stop Date Status    Fill 

Instructions

 

                    metoprolol tartrate 25 mg tablet RxNorm: 223214      1 Table

t(s) Oral QAM and two 

tablets (50mg) in the evening--clarification/dose change 2020          

Active                                   

 

                    Flonase Allergy Relief 50 mcg/actuation nasal spray,suspensi

on RxNorm: 1061261     2

Spray Nasal every night at bedtime 2020      Inactive     

    

 

             simvastatin 40 mg tablet RxNorm: 628859 1 Tablet(s) Oral QD 

020   

10/19/2020                Active                     

 

                omeprazole 40 mg capsule,delayed release RxNorm: 162915  1 Capsu

le(s) Oral QD 

2020          10/19/2020          Active               

 

                    isosorbide mononitrate ER 30 mg tablet,extended release 24 h

r RxNorm: 896253      1 

Tablet(s) Oral every night at bedtime 2020      10/20/2020      Active    

       

 

                    metoprolol tartrate 25 mg tablet RxNorm: 121943      1 Table

t(s) Oral QAM and two 

tablets (50mg) in the evening 2020      Inactive         

 

                omeprazole 40 mg capsule,delayed release RxNorm: 222703  1 Capsu

le(s) Oral QD 

2020          Inactive             

 

                    Xanax 0.25 mg tablet RxNorm: 734548      TAKE 1 TABLET BY [facility] TWICE DAILY 1 

Tablet(s) Oral two times a day as needed as needed for anxiety 2020                Inactive                   

 

             simvastatin 40 mg tablet RxNorm: 320733 1 Tablet(s) Oral QD 

020   

2020                Inactive                   

 

                    metoprolol tartrate 25 mg tablet RxNorm: 962419      1 Table

t(s) Oral QAM and two 

tablets (50mg) in the evening 2020      Inactive         

 

                    metoprolol tartrate 25 mg tablet RxNorm: 180007      1 Table

t(s) Oral QAM and two 

tablets (50mg) in the evening 2020      Inactive         

 

                    Flonase Allergy Relief 50 mcg/actuation nasal spray,suspensi

on RxNorm: 6454811     2

Spray Nasal every night at bedtime 2020      Inactive     

    

 

           simvastatin 40 mg tablet RxNorm: 237716 1 Tablet(s) PO QD 2019 

Inactive                                 

 

                omeprazole 40 mg capsule,delayed release RxNorm: 369839  1 Capsu

le(s) Oral QD 

2019          Inactive             

 

                Xanax 0.25 mg tablet RxNorm: 054204  TAKE 1 TABLET BY MOUTH TWIC

E DAILY 

10/29/2019          2020          Inactive             

 

                omeprazole 40 mg capsule,delayed release RxNorm: 838832  1 Capsu

le(s) PO QD 

10/07/2019          2019          Inactive             

 

             metoprolol tartrate 25 mg tablet RxNorm: 860467 1 Tablet(s) PO BID 

2019                Inactive                   

 

                omeprazole 40 mg capsule,delayed release RxNorm: 253933  1 Capsu

le(s) PO QD 

2019          10/06/2019          Inactive             

 

                    Flonase Allergy Relief 50 mcg/actuation nasal spray,suspensi

on RxNorm: 9013653     2

[place] NASAL QHS 2019      Inactive         

 

           simvastatin 40 mg tablet RxNorm: 305440 1 Tablet(s) PO QD 2019 

Inactive                                 

 

           simvastatin 40 mg tablet RxNorm: 845331 1 Tablet(s) PO QD 2019 

Inactive                                 

 

                omeprazole 40 mg capsule,delayed release RxNorm: 721912  1 Capsu

le(s) PO QD 

2019          Inactive             

 

           simvastatin 40 mg tablet RxNorm: 328061 1 Tablet(s) PO QD 2019 

Inactive                                 

 

                omeprazole 40 mg capsule,delayed release RxNorm: 516843  1 Capsu

le(s) PO QD 

2019          Inactive             

 

             Bactrim  mg-160 mg tablet RxNorm: 783881 1 Tablet(s) PO BID 0

3/08/2019   

2019                Inactive                   

 

             Bactrim  mg-160 mg tablet RxNorm: 406126 1 Tablet(s) PO BID 0

3/08/2019   

2019                Inactive                   

 

             Macrobid 100 mg capsule RxNorm: 399908 1 Capsule(s) PO BID 20

                          Inactive                   

 

             metoprolol tartrate 25 mg tablet RxNorm: 434286 1 Tablet(s) PO BID 

2019                Inactive                   

 

                Xanax 0.25 mg tablet RxNorm: 685687  TAKE 1 TABLET BY MOUTH TWIC

E DAILY 

2018          10/28/2019          Inactive             

 

           Xanax 0.25 mg tablet RxNorm: 995414 1 Tablet(s) PO BID 2018 

Inactive                                 

 

                    Protonix 40 mg tablet,delayed release RxNorm: 627131      1 

Tablet(s) PO BID for 

stomach--replaces omeprazole 2018      Inactive         

 

             Carafate 1 gram tablet RxNorm: 859378 1 Tablet(s) PO AC & HS 2019                Inactive                   

 

           Xanax 0.25 mg tablet RxNorm: 488187 1 Tablet(s) PO BID 10/30/2018 12/

10/2018 

Inactive                                 

 

             Bactrim  mg-160 mg tablet RxNorm: 607929 1 Tablet(s) PO BID 1

   

10/08/2018                Inactive                   

 

             Bactrim  mg-160 mg tablet RxNorm: 098984 1 Tablet(s) PO BID 1

   

10/03/2018                Inactive                   

 

             Carafate 1 gram tablet RxNorm: 431856 1 Tablet(s) PO AC & HS 09/18/

2018   

10/17/2018                Inactive                   

 

                    Protonix 40 mg tablet,delayed release RxNorm: 785815      1 

Tablet(s) PO BID for 

stomach--replaces omeprazole 2018      Inactive         

 

                    Protonix 40 mg tablet,delayed release RxNorm: 993211      1 

Tablet(s) PO BID for 

stomach--replaces omeprazole 2018      Inactive         

 

                    fluticasone 50 mcg/actuation nasal spray,suspension RxNorm: 

2943290     2 Spray 

NASAL QD to each nostril 2018      Inactive         

 

             Nasonex 50 mcg/actuation Spray RxNorm: 6412988 2 [place] NASAL 2018                Inactive                   

 

                omeprazole 40 mg capsule,delayed release RxNorm: 629961  1 Capsu

le(s) PO QD 

2018          08/15/2018          Inactive             

 

                    simvastatin 40 mg tablet RxNorm: 013092      1 Tablet(s) PO 

QD TAKE 1 TABLET EVERY 

DAY             2018      Inactive         

 

             metoprolol tartrate 25 mg tablet RxNorm: 359313 1/2 Tablet(s) PO QD

 2018                Inactive                   

 

                simvastatin 40 mg tablet RxNorm: 843401  Tablet(s) TAKE 1 TABLET

 EVERY DAY 

2017          Inactive             

 

             metoprolol tartrate 25 mg tablet RxNorm: 450613 1/2 Tablet(s) PO QD

 2017                Inactive                   

 

                    Flonase 50 mcg/actuation nasal spray,suspension RxNorm: 1797

933     2 [place] NASAL 

BID             2017      Inactive         

 

                omeprazole 40 mg capsule,delayed release RxNorm: 226297  1 Capsu

le(s) PO QD 

2017          Inactive             

 

             metoprolol tartrate 25 mg tablet RxNorm: 744063 1/2 Tablet(s) PO QD

 04/10/2017   

2017                Inactive                   

 

                    ciprofloxacin 0.2 % ear drops in a dropperette RxNorm: 26349

6      4 Drop(s) OTIC TID

for 1 week      2016      Inactive         

 

           cefdinir 300 mg capsule RxNorm: 755170 2 Capsule(s) PO QD 2016 

Inactive                                 

 

                    omeprazole 40 mg capsule,delayed release RxNorm: 311933     

 TAKE 1 CAPSULE EVERY DAY

                2016      Inactive         

 

             simvastatin 40 mg tablet RxNorm: 545952 TAKE 1 TABLET EVERY DAY 2017                Inactive                   

 

                    Flonase 50 mcg/actuation nasal spray,suspension RxNorm: 1797

933     2 [place] NASAL 

BID             2015      Inactive         

 

             cefuroxime axetil 250 mg tablet RxNorm: 091885 1 Tablet(s) PO BID 0

2015                Inactive                   

 

             cefuroxime axetil 250 mg tablet RxNorm: 054957 1 Tablet(s) PO BID 0

2015                Inactive                   

 

                omeprazole 40 mg capsule,delayed release RxNorm: 719234  1 Capsu

le(s) PO QD 

2015          Inactive             

 

           meloxicam 7.5 mg tablet RxNorm: 582421 1 Tablet(s) PO QD 2015 0

2015 

Inactive                                 

 

                loratadine 10 mg tablet RxNorm: 365529  1 Tablet(s) PO QAM for a

llergies 

2014          Inactive             

 

                    Flonase 50 mcg/actuation nasal spray,suspension RxNorm: 8963

23      2 [place] NASAL BID

                2014      12/15/2015      Inactive         

 

           prednisone 20 mg tablet RxNorm: 281227 2 Tablet(s) PO BID 2014 

Inactive                                 

 

             Dyazide 37.5 mg-25 mg capsule RxNorm: 731970 1 Capsule(s) PO QAM 2014                Inactive                   

 

           Ceftin 500 mg tablet RxNorm: 658466 1 Tablet(s) PO BID 2014 

Inactive                                 

 

           Ceftin 500 mg tablet RxNorm: 206844 1 Tablet(s) PO BID 2014 

Inactive                                 

 

                    simvastatin 40 mg tablet RxNorm: 019021      Tablet(s) PO TA

KE ONE TABLET BY MOUTH 

EVERY DAY       2014      Inactive         

 

                    metoprolol tartrate 25 mg tablet RxNorm: 721007      Tablet(

s) PO TAKE ONE-HALF 

TABLET BY MOUTH EVERY DAY 2014      Inactive         

 

           Ceftin 500 mg tablet RxNorm: 421836 1 Tablet(s) PO BID 10/15/2013 10/

 

Inactive                                 

 

                loratadine 10 mg tablet RxNorm: 804044  1 Tablet(s) PO QAM for a

llergies 

2013          Inactive             

 

                    omeprazole 20 mg capsule,delayed release RxNorm: 557489     

 Capsule(s) PO TAKE ONE 

CAPSULE BY MOUTH TWICE DAILY 2013      Inactive         

 

                omeprazole 20 mg capsule,delayed release RxNorm: 640950  1 Capsu

le(s) PO BID 

2013          Inactive             

 

             Augmentin 875 mg-125 mg tablet RxNorm: 306523 1 Tablet(s) PO Q12H 0

5/10/2013   

2013                Inactive                   

 

             Floxin Otic Drops 1 bottle Drops RxNorm:      5 Drop(s) OTIC BID 05

/10/2013   

2013                Inactive                   

 

                    metoprolol tartrate 25 mg tablet RxNorm: 558520      Tablet(

s) PO TAKE ONE-HALF 

TABLET BY MOUTH EVERY DAY 2013      Inactive         

 

                    simvastatin 40 mg tablet RxNorm: 975145      Tablet(s) PO TA

KE ONE TABLET BY MOUTH 

EVERY DAY       2013      Inactive         

 

                lancets         RxNorm:         Misc Miscellaneous USE ONE TO CH

YOGI GLUCOSE EVERY DAY 

2013          Inactive             

 

             Carafate 1 gram tablet RxNorm: 454248 1 Tablet(s) PO AC & HS 2015                Inactive                   

 

           Flagyl 500 mg tablet RxNorm: 898212 1 Tablet(s) PO TID 10/10/2012 10/

 

Inactive                                 

 

             Carafate 1 gram tablet RxNorm: 350730 1 Tablet(s) PO AC & HS 10/10/

2012   

2012                Inactive                   

 

          One Touch Test strips RxNorm:   Miscellaneous QD 2012

 Inactive  

one touch ultra mini test strips

 

           Protonix 40 mg Tab RxNorm: 149424 1 Tablet(s) PO QD 2012 

Inactive                                 

 

           Protonix 40 mg Tab RxNorm: 183017 1 Tablet(s) PO QD 2012 10/09/

2012 

Inactive                                 

 

           prednisone 20 mg Tab RxNorm: 268355 1 Tablet(s) PO BID 2012 

Inactive                                 

 

             Flexeril 5 mg Tab RxNorm: 277452 1 Tablet(s) PO QHS for spasm 2012                Inactive                   

 

             Flexeril 5 mg Tab RxNorm: 524684 1 Tablet(s) PO QHS for spasm 2012                Inactive                   

 

                amlodipine 2.5 mg Tab RxNorm: 824297  1/2 Tablet(s) PO QD replac

es 5mg dose 

2012          Inactive             

 

           simvastatin 40 mg tablet RxNorm: 254352 1 Tablet(s) PO QD 2012 

Inactive                                 

 

             metoprolol tartrate 25 mg tablet RxNorm: 840081 1/2 Tablet(s) PO QD

 2012                Inactive                   

 

                omeprazole 20 mg capsule,delayed release RxNorm: 755708  1 Capsu

le(s) PO BID 

2012          Inactive             

 

           cefdinir 300 mg Cap RxNorm: 036538 2 Capsule(s) PO QD 2011 

Inactive                                 

 

           simvastatin 40 mg Tab RxNorm: 060375 1 Tablet(s) PO QD 2011 

Inactive                                 

 

             metoprolol tartrate 25 mg Tab RxNorm: 794332 1/2 Tablet(s) PO QD 10

/   

2012                Inactive                   

 

             metoprolol tartrate 25 mg Tab RxNorm: 598130 1/2 Tablet(s) PO QD 10

/   

10/24/2011                Inactive                   

 

           meclizine 25 mg Tab RxNorm: 9160206 1 Tablet(s) PO QHS 2011 

Inactive                                for dizziness

 

           Ceftin 500 mg Tab RxNorm: 502412 1 Tablet(s) PO BID 2011 

Inactive                                 

 

                omeprazole 20 mg Cap, Delayed Release RxNorm: 762273  1 Capsule(

s) PO BID 

2011          10/24/2011          Inactive             

 

           simvastatin 40 mg Tab RxNorm: 999905 1 Tablet(s) PO QD 2011 

Inactive                                 

 

           simvastatin 40 mg Tab RxNorm: 462830 1 Tablet(s) PO QD 2011 

Inactive                                 

 

           simvastatin 40 mg Tab RxNorm: 070001 1 Tablet(s) PO QD 2010 

Inactive                                 

 

             Tessalon Perles 100 mg Cap RxNorm: 602992 1 Capsule(s) PO Q6-8H 2010                Inactive                   

 

           cefdinir 300 mg Cap RxNorm: 669381 1 Capsule(s) PO BID 2010 

Inactive                                 

 

           Lexapro 10 mg Tab RxNorm: 392151 1 Tablet(s) PO QD 2010

010 

Inactive                                 

 

           Cipro 250 mg Tab RxNorm: 140827 1 Tablet(s) PO BID 2010 10/04/2

010 

Inactive                                 

 

             Wellbutrin  mg 24 hr Tab RxNorm: 280902 1 Tablet(s) PO QAM 2010                Inactive                   

 

          Fish Oil 1,000 mg Cap RxNorm:   1 Capsule(s) PO QD No Start Date      

     Active     

 

                Tylenol Extra Strength 500 mg tablet RxNorm: 909919  1/2 Tablet(

s) PO as needed 

No Start Date                           Active               

 

                nitroglycerin 0.4 mg sublingual tablet RxNorm: 296599  Tablet(s)

 SL as needed No 

Start Date                              Active               

 

                    Calcium with Vitamin D 600 mg (1,500 mg)-400 unit tablet RxN

orm: 005107      1 

Tablet(s) PO QD No Start Date                   Active           

 

           Multivitamin & Mineral Formula Tab RxNorm:    1 Tablet(s) PO QD No St

art Date            

Active                                   

 

          vitamin B complex capsule RxNorm:   1 Capsule(s) PO QD No Start Date  

         Active     

 

          Aspirin 81 mg Tab RxNorm: 797613 1 Tablet(s) PO QD No Start Date      

     Active     

 

           Vitamin D 1,000 unit Cap RxNorm: 159866 1 Capsule(s) PO QD No Start D

ate            

Active                                   

 

          Co Q-10 oral RxNorm: 15676 oral      No Start Date           Active   

  

 

             metoprolol tartrate 25 mg Tab RxNorm: 320257 1/2 Tablet(s) PO QD No

 Start Date 

10/23/2011                Inactive                   

 

             Nasonex 50 mcg/actuation Spray RxNorm: 8196566 2 Spray NASAL No Sta

rt Date 

2018                Inactive                   

 

           Vitamin B12 1000mcg Tablet RxNorm:    1 Tablet(s) PO QD No Start Date

 2015 

Inactive                                 

 

           amlodipine 5 mg Tab RxNorm: 672115 1 Tablet(s) PO QD No Start Date  

Inactive                                 

 

             simvastatin 40 mg tablet RxNorm: 850933 1 Tablet(s) PO QD No Start 

Date 

2019                Inactive                   

 

                omeprazole 20 mg capsule,delayed release RxNorm: 192687  1 Capsu

le(s) PO BID No 

Start Date          2013          Inactive             

 

                omeprazole 40 mg capsule,delayed release RxNorm: 261760  1 Capsu

le(s) PO QD No 

Start Date          2019          Inactive             

 

           Nexium 40 mg Cap RxNorm: 476242 1 Capsule(s) PO QD No Start Date  

Inactive                                 

 

             Stool Softener 100 mg Tab RxNorm: 1117048 2 Tablet(s) PO BID No Sta

rt Date 

2012                Inactive                   

 

                    Calcium with Vitamin D 600 mg (1,500 mg)-400 unit Tab RxNorm

: 448855      1 Tablet(s)

PO QD           No Start Date   2015      Inactive         

 

             iron 325 mg (65 mg iron) Tab RxNorm: 694283 1 Tablet(s) PO QD No St

art Date 

2015                Inactive                   

 

                Flonase 50 mcg/actuation Nasal Spray RxNorm: 995128  2 [place] ROZ

AL BID No Start 

Date                2014          Inactive             

 

                    fluticasone 50 mcg/actuation nasal spray,suspension RxNorm: 

5008750     2 Spray 

NASAL QD to each nostril No Start Date   2018      Inactive         

 

             Nasonex 50 mcg/actuation Spray RxNorm: 2814019 2 Spray NASAL QD No 

Start Date 

2018                Inactive                   

 

                    hydralazine 50 mg tablet RxNorm: 746443      1 Tablet(s) PO 

as needed for BP over 

160/90          No Start Date   2018      Inactive         

 

             Vimovo 500 mg-20 mg 12 hr Tab RxNorm: 597141 1 Tablet(s) PO BID No 

Start Date 

2011                Inactive                   

 

             Tylenol PM 25 mg-500 mg/15 mL Oral Soln RxNorm: 5051294 1 PO QPM   

  No Start Date 

2015                Inactive                   

 

          Iron (Ferrous Sulfate) Oral RxNorm:   Oral      No Start Date 20

12 Inactive   

 

             Reglan 10 mg Tab RxNorm: 756859 1 Tablet(s) PO TID before meals No 

Start Date 

2011                Inactive                   

 

           Xanax 1 mg Tab RxNorm: 007010 1/2 Tablet(s) PO QD No Start Date  

Inactive                                 

 

             amlodipine 10 mg tablet RxNorm: 866358 1 Tablet(s) PO QD No Start D

ate 

2014                Inactive                   

 

          Iron (dried) Oral RxNorm:   Oral      No Start Date 2012 Inactiv

e   

 

                metoprolol tartrate 50 mg tablet RxNorm: 735064  1 Tablet(s) PO 

BID No Start Date

                    12/10/2018          Inactive             

 

           Xanax 0.25 mg tablet RxNorm: 968890 1 Tablet(s) PO BID No Start Date 

10/29/2018 

Inactive                                 

 

                lancets         RxNorm:         Miscellaneous check blood sugar 

at least once daily No Start 

Date                2013          Inactive             

 

           simvastatin 40 mg Tab RxNorm: 041171 1 Tablet(s) PO QD No Start Date 

2010 

Inactive                                 

 

                    isosorbide mononitrate ER 30 mg tablet,extended release 24 h

r RxNorm: 585888      1 

Tablet(s) PO QHS No Start Date   2019      Inactive         

 

             amlodipine 2.5 mg tablet RxNorm: 488354 1 Tablet(s) PO QD No Start 

Date 

2014                Inactive                   

 

                    isosorbide mononitrate ER 30 mg tablet,extended release 24 h

r RxNorm: 586229      1 

Tablet(s) PO QHS No Start Date   2020      Inactive         

 

             sucralfate 1 gram tablet RxNorm: 523247 1 Tablet(s) PO QID No Start

 Date 

2019                Inactive                   

 

          Fish Oil Oral RxNorm:   Oral      No Start Date 2012 Inactive   

 

          Multiple Vitamin Oral RxNorm:   Oral      No Start Date 2012 [place]

ctive   

 

                metoprolol tartrate 25 mg tablet RxNorm: 819667  1/2 Tablet(s) P

O QD No Start 

Date                2017          Inactive             

 

                metoprolol tartrate 50 mg tablet RxNorm: 045850  1/2 Tablet(s) P

O BID No Start 

Date                2019          Inactive             

 

                    Flonase Allergy Relief 50 mcg/actuation nasal spray,suspensi

on RxNorm: 0672378     2

[place] NASAL QHS No Start Date   2019      Inactive         







Medication Administered

No Medication Administered data



Immunizations





                Vaccine         Codes           Date            Status

 

                Influenza       CVX: 135        10/22/2019      Complete

 

                Influenza       CVX: 135        10/22/2019      Complete

 

                Influenza       CVX: 135        2018      Complete

 

                Influenza       CVX: 135        10/06/2017      Complete

 

                Influenza       CVX: 135        10/07/2016      Complete

 

                Influenza       CVX: 135        10/16/2015       

 

                Influenza       CVX: 141        2013       

 

                Influenza (Adult) CVX: 141        10/06/2010       







Results





          Observation Observation Code Item      Item Code Result    Date      S

[facility] Location

 

          COMPLETE BLOOD COUNT [id number]   WBC                 7.1 10e9/L 20

20 Unknown

 

          COMPLETE BLOOD COUNT [id number]   RBC                 4.16 10e12/L 2020 Unknown

 

          COMPLETE BLOOD COUNT 0893955   HEMOGLOBIN           12.4 g/dL 20 Unknown

 

          COMPLETE BLOOD COUNT [id number]   HEMATOCRIT           39.3 %    20

20 Unknown

 

          COMPLETE BLOOD COUNT [id number]   MCV                 94.5 fL   

0 Unknown

 

          COMPLETE BLOOD COUNT [id number]   MCH                 29.8 pg   

0 Unknown

 

          COMPLETE BLOOD COUNT [id number]   MCHC                31.6 g/dL 

0 Unknown

 

          COMPLETE BLOOD COUNT [id number]   PLATELET COUNT           161 10e9/L 2020 Unknown

 

          COMPLETE BLOOD COUNT [id number]   Mean Plt Volume           10.8 fL   2020 Unknown

 

          COMPLETE BLOOD COUNT [id number]   Neut Auto           38.7 %    

0 Unknown

 

          COMPLETE BLOOD COUNT [id number]   Lymph Auto           48.0 %    20 Unknown

 

          COMPLETE BLOOD COUNT [id number]   Mono Auto           9.5 %     

0 Unknown

 

          COMPLETE BLOOD COUNT [id number]   RDW                 13.5 %    

0 Unknown

 

          COMPLETE BLOOD COUNT [id number]   Eos Auto            3.2 %     

0 Unknown

 

          COMPLETE BLOOD COUNT [id number]   Baso Auto           0.6 %     

0 Unknown

 

          COMPLETE BLOOD COUNT 5049922   Neutrophil Abs           2.75 10e9/L  Unknown

 

          COMPLETE BLOOD COUNT [id number]   Lymphocyte Abs           3.41 10e9/L  Unknown

 

          COMPLETE BLOOD COUNT [id number]   Monocyte Abs           0.67 10e9/L 2020 Unknown

 

          COMPLETE BLOOD COUNT [id number]   Eosinophil Abs           0.23 10e9/L  Unknown

 

          COMPLETE BLOOD COUNT [id number]   RDW-SD              44.7 fL   

0 Unknown

 

          COMPLETE BLOOD COUNT [id number]   Basophil Abs           0.04 10e9/L 2020 Unknown

 

          THYROID STIMULATING HORMONE 39619     TSH                 1.677 uIU/mL

 2020 Unknown

 

          COMPREHENSIVE METABOLIC 46392     AST                 19 U/L    2020 Unknown

 

          COMPREHENSIVE METABOLIC 36998     ALT                 12 U/L    2020 Unknown

 

          COMPREHENSIVE METABOLIC 17274     BUN                 17 mg/dL  2020 Unknown

 

          COMPREHENSIVE METABOLIC 89071     ALBUMIN             4.2 g/dL  2020 Unknown

 

          COMPREHENSIVE METABOLIC 41264     CHLORIDE            103 mmol/L  Unknown

 

          COMPREHENSIVE METABOLIC 78174     Bili Total           0.2 mg/dL  Unknown

 

          COMPREHENSIVE METABOLIC 90517     ALK PHOS            61 U/L    2020 Unknown

 

          COMPREHENSIVE METABOLIC 60518     SODIUM              140 mmol/L  Unknown

 

          COMPREHENSIVE METABOLIC 04634     CREATININE           0.95 mg/dL 2020 Unknown

 

          COMPREHENSIVE METABOLIC 14437     CALCIUM             9.4 mg/dL 2020 Unknown

 

          COMPREHENSIVE METABOLIC 86505     POTASSIUM           4.2 mmol/L  Unknown

 

          COMPREHENSIVE METABOLIC 35689     Total Protein           6.5 g/dL   Unknown

 

          COMPREHENSIVE METABOLIC 52985     Glucose             103 mg/dL 2020 Unknown

 

          COMPREHENSIVE METABOLIC 05477     Bicarbonate           26 mmol/L 2020 Unknown

 

          COMPREHENSIVE METABOLIC 93121     AGAP                11 mmol/L 2020 Unknown

 

          GFR CALC  8979689   GFR Non Afr Amr           57 mL/min 2020 Unk

nown

 

          GFR CALC  7080420   GFR Afr Amr           >60 mL/min 2020 Unknow

n

 

          GFR CALC  6459618   GFR Non Afr Amr           52 mL/min 10/11/2019 Unk

nown

 

          GFR CALC  5044721   GFR Afr Amr           >60 mL/min 10/11/2019 Unknow

n

 

          COMPREHENSIVE METABOLIC 72869     AST                 17 U/L    10/11/

2019 Unknown

 

          COMPREHENSIVE METABOLIC 64942     ALT                 11 U/L    10/11/

2019 Unknown

 

          COMPREHENSIVE METABOLIC 62693     BUN                 17 mg/dL  10/11/

2019 Unknown

 

          COMPREHENSIVE METABOLIC 09924     ALBUMIN             3.9 g/dL  10/11/

2019 Unknown

 

          COMPREHENSIVE METABOLIC 68944     CHLORIDE            108 mmol/L 10/11

/2019 Unknown

 

          COMPREHENSIVE METABOLIC 87988     Bili Total           0.5 mg/dL 10/11

/2019 Unknown

 

          COMPREHENSIVE METABOLIC 49672     ALK PHOS            72 U/L    10/11/

2019 Unknown

 

          COMPREHENSIVE METABOLIC 55631     SODIUM              142 mmol/L 10/11

/2019 Unknown

 

          COMPREHENSIVE METABOLIC 15109     CREATININE           1.02 mg/dL 10/1

2019 Unknown

 

          COMPREHENSIVE METABOLIC 09427     CALCIUM             9.3 mg/dL 10/11/

2019 Unknown

 

          COMPREHENSIVE METABOLIC 22364     POTASSIUM           4.0 mmol/L 10/11

/2019 Unknown

 

          COMPREHENSIVE METABOLIC 01174     Total Protein           6.2 g/dL  10

/2019 Unknown

 

          COMPREHENSIVE METABOLIC 18907     Glucose             93 mg/dL  10/11/

2019 Unknown

 

          COMPREHENSIVE METABOLIC 46037     Bicarbonate           27 mmol/L 10/1

2019 Unknown

 

          COMPREHENSIVE METABOLIC 29692     AGAP                7 mmol/L  10/11/

2019 Unknown

 

          GFR CALC  6037953   GFR Non Afr Amr           48 mL/min 06/10/2019 Unk

nown

 

          GFR CALC  1201137   GFR Afr Amr           59 mL/min 06/10/2019 Unknown

 

          THYROID STIMULATING HORMONE 19708     TSH                 1.936 uIU/mL

 06/10/2019 Unknown

 

          COMPREHENSIVE METABOLIC 86622     AST                 18 U/L    06/10/

2019 Unknown

 

          COMPREHENSIVE METABOLIC 61177     ALT                 11 U/L    06/10/

2019 Unknown

 

          COMPREHENSIVE METABOLIC 20551     BUN                 18 mg/dL  06/10/

2019 Unknown

 

          COMPREHENSIVE METABOLIC 01040     ALBUMIN             4.2 g/dL  06/10/

2019 Unknown

 

          COMPREHENSIVE METABOLIC 87088     CHLORIDE            109 mmol/L 06/10

/2019 Unknown

 

          COMPREHENSIVE METABOLIC 39043     Bili Total           0.4 mg/dL 06/10

/2019 Unknown

 

          COMPREHENSIVE METABOLIC 39512     ALK PHOS            64 U/L    06/10/

2019 Unknown

 

          COMPREHENSIVE METABOLIC 24310     SODIUM              142 mmol/L 06/10

/2019 Unknown

 

          COMPREHENSIVE METABOLIC 65233     CREATININE           1.09 mg/dL  Unknown

 

          COMPREHENSIVE METABOLIC 57381     CALCIUM             9.3 mg/dL 06/10/

2019 Unknown

 

          COMPREHENSIVE METABOLIC 26919     POTASSIUM           4.7 mmol/L 06/10

/2019 Unknown

 

          COMPREHENSIVE METABOLIC 34907     Total Protein           6.3 g/dL  06

/10/2019 Unknown

 

          COMPREHENSIVE METABOLIC 13286     Glucose             84 mg/dL  06/10/

2019 Unknown

 

          COMPREHENSIVE METABOLIC 35625     Bicarbonate           25 mmol/L  Unknown

 

          COMPREHENSIVE METABOLIC 89353     AGAP                8 mmol/L  06/10/

2019 Unknown

 

          COMPLETE BLOOD COUNT [id number]   WBC                 7.8 10e9/L 06/10/20

19 Unknown

 

          COMPLETE BLOOD COUNT [id number]   RBC                 4.04 10e12/L 06/10/

2019 Unknown

 

          COMPLETE BLOOD COUNT [id number]   HEMOGLOBIN           12.2 g/dL 06/10/20

19 Unknown

 

          COMPLETE BLOOD COUNT [id number]   HEMATOCRIT           38.6 %    06/10/20

19 Unknown

 

          COMPLETE BLOOD COUNT [id number]   MCV                 95.5 fL   06/10/201

9 Unknown

 

          COMPLETE BLOOD COUNT [id number]   MCH                 30.2 pg   06/10/201

9 Unknown

 

          COMPLETE BLOOD COUNT [id number]   MCHC                31.6 g/dL 06/10/201

9 Unknown

 

          COMPLETE BLOOD COUNT [id number]   PLATELET COUNT           215 10e9/L 06/

10/2019 Unknown

 

          COMPLETE BLOOD COUNT [id number]   Mean Plt Volume           11.2 fL   06/

10/2019 Unknown

 

          COMPLETE BLOOD COUNT [id number]   Neut Auto           45.7 %    06/10/201

9 Unknown

 

          COMPLETE BLOOD COUNT [id number]   Lymph Auto           42.1 %    06/10/20

19 Unknown

 

          COMPLETE BLOOD COUNT [id number]   Mono Auto           9.2 %     06/10/201

9 Unknown

 

          COMPLETE BLOOD COUNT [id number]   RDW                 13.4 %    06/10/201

9 Unknown

 

          COMPLETE BLOOD COUNT [id number]   Eos Auto            2.7 %     06/10/201

9 Unknown

 

          COMPLETE BLOOD COUNT [id number]   Baso Auto           0.3 %     06/10/201

9 Unknown

 

          COMPLETE BLOOD COUNT [id number]   Neutrophil Abs           3.56 10e9/L 06

/10/2019 Unknown

 

          COMPLETE BLOOD COUNT [id number]   Lymphocyte Abs           3.28 10e9/L 06

/10/2019 Unknown

 

          COMPLETE BLOOD COUNT [id number]   Monocyte Abs           0.72 10e9/L  Unknown

 

          COMPLETE BLOOD COUNT [id number]   Eosinophil Abs           0.21 10e9/L 06

/10/2019 Unknown

 

          COMPLETE BLOOD COUNT [id number]   RDW-SD              44.9 fL   06/10/201

9 Unknown

 

          COMPLETE BLOOD COUNT [id number]   Basophil Abs           0.02 10e9/L  Unknown

 

          COMPLETE BLOOD COUNT [id number]   WBC                 5.2 10e9/L 20

18 Unknown

 

          COMPLETE BLOOD COUNT [id number]   RBC                 4.21 10e12/L 2018 Unknown

 

          COMPLETE BLOOD COUNT [id number]   HEMOGLOBIN           12.8 g/dL 20

18 Unknown

 

          COMPLETE BLOOD COUNT [id number]   HEMATOCRIT           39.0 %    20

18 Unknown

 

          COMPLETE BLOOD COUNT [id number]   MCV                 92.6 fL   

8 Unknown

 

          COMPLETE BLOOD COUNT [id number]   MCH                 30.4 pg   

8 Unknown

 

          COMPLETE BLOOD COUNT [id number]   MCHC                32.8 g/dL 

8 Unknown

 

          COMPLETE BLOOD COUNT [id number]   PLATELET COUNT           202 10e9/L  Unknown

 

          COMPLETE BLOOD COUNT [id number]   Mean Plt Volume           10.7 fL    Unknown

 

          COMPLETE BLOOD COUNT [id number]   Neut Auto           40.9 %    

8 Unknown

 

          COMPLETE BLOOD COUNT [id number]   Lymph Auto           46.3 %    20

18 Unknown

 

          COMPLETE BLOOD COUNT [id number]   Mono Auto           9.3 %     

8 Unknown

 

          COMPLETE BLOOD COUNT [id number]   RDW                 13.7 %    

8 Unknown

 

          COMPLETE BLOOD COUNT [id number]   Eos Auto            2.9 %     

8 Unknown

 

          COMPLETE BLOOD COUNT [id number]   Baso Auto           0.6 %     

8 Unknown

 

          COMPLETE BLOOD COUNT [id number]   Neutrophil Abs           2.13 10e9/L  Unknown

 

          COMPLETE BLOOD COUNT [id number]   Lymphocyte Abs           2.41 10e9/L  Unknown

 

          COMPLETE BLOOD COUNT [id number]   Monocyte Abs           0.48 10e9/L  Unknown

 

          COMPLETE BLOOD COUNT [id number]   Eosinophil Abs           0.15 10e9/L  Unknown

 

          COMPLETE BLOOD COUNT [id number]   RDW-SD              45.2 fL   

8 Unknown

 

          COMPLETE BLOOD COUNT [id number]   Basophil Abs           0.03 10e9/L  Unknown

 

          [place] [address]     Glucose             118 mg/dL  Unknown

 

          [place] [address]     CREATININE           1.01 mg/dL  Unknown

 

          [place] [address]     BUN                 14 mg/dL   Unknown

 

          [place] [address]     SODIUM              141 mmol/L  Unknown

 

          [place] [address]     POTASSIUM           4.0 mmol/L  Unknown

 

          [place] [address]     CHLORIDE            108 mmol/L  Unknown

 

          [place] [address]     Bicarbonate           25 mmol/L  Unknown

 

          [place] [address]     AGAP                8 mmol/L   Unknown

 

          [place] [address]     CALCIUM             9.6 mg/dL  Unknown

 

          FREE T4   62140     T4 Free             1.23 ng/dL 2018 Unknown

 

          GFR CALC  8109138   GFR Non Afr Amr           53 mL/min 2018 Unk

nown

 

          GFR CALC  5338485   GFR Afr Amr           >60 mL/min 2018 Unknow

n

 

          THYROID STIMULATING HORMONE 06858     TSH                 2.124 uIU/mL

 2018 Unknown

 

          LIPID GROUP [id number]     Cholesterol           152 mg/dL 2018 Unkno

wn

 

          LIPID GROUP [id number]     Triglyceride           151 mg/dL 2018 Unkn

own

 

          LIPID GROUP [id number]     HDL CHOLESTEROL           47 mg/dL  2018 U

nknown

 

          LIPID GROUP 54426     Chol/HDL Ratio           3.23 ratio 2018 U

nknown

 

          LIPID GROUP [id number]     NON-HDL Chol           105 mg/dL 2018 Unkn

own

 

          LIPID GROUP 90825     LDL Cholesterol           75 mg/dL  2018 U

nknown

 

          ASSAY OF TROPONIN QUANT 62348     Troponin-I           <0.30 ng/mL  Unknown

 

          COMPREHENSIVE METABOLIC 64377     AST                 20 U/L    2018 Unknown

 

          COMPREHENSIVE METABOLIC 41700     ALT                 14 U/L    2018 Unknown

 

          COMPREHENSIVE METABOLIC 57574     BUN                 19 mg/dL  2018 Unknown

 

          COMPREHENSIVE METABOLIC 82116     ALBUMIN             4.2 g/dL  2018 Unknown

 

          COMPREHENSIVE METABOLIC 00535     CHLORIDE            102 mmol/L  Unknown

 

          COMPREHENSIVE METABOLIC 92229     Bili Total           0.4 mg/dL  Unknown

 

          COMPREHENSIVE METABOLIC 27908     ALK PHOS            66 U/L    2018 Unknown

 

          COMPREHENSIVE METABOLIC 12631     SODIUM              135 mmol/L  Unknown

 

          COMPREHENSIVE METABOLIC 11021     CREATININE           1.01 mg/dL  Unknown

 

          COMPREHENSIVE METABOLIC 85818     CALCIUM             9.3 mg/dL 2018 Unknown

 

          COMPREHENSIVE METABOLIC 25753     POTASSIUM           4.8 mmol/L  Unknown

 

          COMPREHENSIVE METABOLIC 02302     Total Protein           7.0 g/dL   Unknown

 

          COMPREHENSIVE METABOLIC 17195     Glucose             91 mg/dL  2018 Unknown

 

          COMPREHENSIVE METABOLIC 59363     Bicarbonate           23 mmol/L  Unknown

 

          COMPREHENSIVE METABOLIC 82491     AGAP                10 mmol/L 2018 Unknown

 

          COMPLETE BLOOD COUNT [id number]   WBC                 7.5 10e9/L 20

18 Unknown

 

          COMPLETE BLOOD COUNT [id number]   RBC                 4.13 10e12/L 2018 Unknown

 

          COMPLETE BLOOD COUNT [id number]   HEMOGLOBIN           12.6 g/dL 20

18 Unknown

 

          COMPLETE BLOOD COUNT [id number]   HEMATOCRIT           38.4 %    20

18 Unknown

 

          COMPLETE BLOOD COUNT [id number]   MCV                 93.0 fL   

8 Unknown

 

          COMPLETE BLOOD COUNT [id number]   MCH                 30.5 pg   

8 Unknown

 

          COMPLETE BLOOD COUNT [id number]   MCHC                32.8 g/dL 

8 Unknown

 

          COMPLETE BLOOD COUNT [id number]   PLATELET COUNT           204 10e9/L  Unknown

 

          COMPLETE BLOOD COUNT [id number]   Mean Plt Volume           10.9 fL    Unknown

 

          COMPLETE BLOOD COUNT [id number]   Neut Auto           43.1 %    

8 Unknown

 

          COMPLETE BLOOD COUNT [id number]   Lymph Auto           45.0 %    20

18 Unknown

 

          COMPLETE BLOOD COUNT [id number]   Mono Auto           9.2 %     

8 Unknown

 

          COMPLETE BLOOD COUNT [id number]   RDW                 13.4 %    

8 Unknown

 

          COMPLETE BLOOD COUNT [id number]   Eos Auto            2.3 %     

8 Unknown

 

          COMPLETE BLOOD COUNT [id number]   Baso Auto           0.4 %     

8 Unknown

 

          COMPLETE BLOOD COUNT [id number]   Neutrophil Abs           3.23 10e9/L  Unknown

 

          COMPLETE BLOOD COUNT [id number]   Lymphocyte Abs           3.38 10e9/L  Unknown

 

          COMPLETE BLOOD COUNT [id number]   Monocyte Abs           0.69 10e9/L  Unknown

 

          COMPLETE BLOOD COUNT [id number]   Eosinophil Abs           0.17 10e9/L  Unknown

 

          COMPLETE BLOOD COUNT [id number]   RDW-SD              44.4 fL   

8 Unknown

 

          COMPLETE BLOOD COUNT [id number]   Basophil Abs           0.03 10e9/L  Unknown

 

          GFR CALC  8634058   GFR Non Afr Amr           53 mL/min 2018 Unk

nown

 

          GFR CALC  5347142   GFR Afr Amr           >60 mL/min 2018 Unknow

n

 

          GLYCOSYLATED HEMOGLOBIN TEST 13047     Hgb A1c   51568-7   5.4 %     0

2018 Unknown

 

          MEAN GLUC 6723471   Calc Mean Gluc           108 mg/dL 2018 Unkn

own

 

          MEAN GLUC 3587845   Calc Mean Gluc           114 mg/dL 2017 Unkn

own

 

          LIPID GROUP 53661     Cholesterol           146 mg/dL 2017 Unkno

wn

 

          LIPID GROUP 77024     Triglyceride           119 mg/dL 2017 Unkn

own

 

          LIPID GROUP 63671     HDL CHOLESTEROL           47 mg/dL  2017 U

nknown

 

          LIPID GROUP 12766     Chol/HDL Ratio           3.11 ratio 2017 U

nknown

 

          LIPID GROUP 17670     NON-HDL Chol           99 mg/dL  2017 Unkn

own

 

          LIPID GROUP 59466     LDL Cholesterol           75 mg/dL  2017 U

nknown

 

          GLYCOSYLATED HEMOGLOBIN TEST 61351     Hgb A1c   11803-3   5.6 %     0

2017 Unknown

 

          COMPREHENSIVE METABOLIC 48136     AST                 22 U/L    2017 Unknown

 

          COMPREHENSIVE METABOLIC 96646     ALT                 12 U/L    2017 Unknown

 

          COMPREHENSIVE METABOLIC 71942     BUN                 17 mg/dL  2017 Unknown

 

          COMPREHENSIVE METABOLIC 49550     ALBUMIN             4.0 g/dL  2017 Unknown

 

          COMPREHENSIVE METABOLIC 49079     CHLORIDE            110 mmol/L  Unknown

 

          COMPREHENSIVE METABOLIC 76560     Bili Total           0.4 mg/dL  Unknown

 

          COMPREHENSIVE METABOLIC 08617     ALK PHOS            63 U/L    2017 Unknown

 

          COMPREHENSIVE METABOLIC 90003     SODIUM              140 mmol/L  Unknown

 

          COMPREHENSIVE METABOLIC 32977     CREATININE           1.05 mg/dL  Unknown

 

          COMPREHENSIVE METABOLIC 68174     CALCIUM             9.2 mg/dL 2017 Unknown

 

          COMPREHENSIVE METABOLIC 81565     POTASSIUM           4.2 mmol/L  Unknown

 

          COMPREHENSIVE METABOLIC 78049     Total Protein           6.2 g/dL   Unknown

 

          COMPREHENSIVE METABOLIC 80051     Glucose             87 mg/dL  2017 Unknown

 

          COMPREHENSIVE METABOLIC 98402     Bicarbonate           24 mmol/L  Unknown

 

          COMPREHENSIVE METABOLIC 46254     AGAP                6 mmol/L  2017 Unknown

 

          GFR CALC  4624273   GFR Non Afr Amr           51 mL/min 2017 Unk

nown

 

          GFR CALC  6241866   GFR Afr Amr           >60 mL/min 2017 Unknow

n

 

          COMPLETE BLOOD COUNT [id number]   WBC                 6.7 10e9/L 20

17 Unknown

 

          COMPLETE BLOOD COUNT 8237535   RBC                 4.04 10e12/L 2017 Unknown

 

          COMPLETE BLOOD COUNT [id number]   HEMOGLOBIN           12.1 g/dL 20

17 Unknown

 

          COMPLETE BLOOD COUNT [id number]   HEMATOCRIT           38.0 %    20

17 Unknown

 

          COMPLETE BLOOD COUNT [id number]   MCV                 94.1 fL   

7 Unknown

 

          COMPLETE BLOOD COUNT 9904480   MCH                 30.0 pg   

7 Unknown

 

          COMPLETE BLOOD COUNT 7186110   MCHC                31.8 g/dL 

7 Unknown

 

          COMPLETE BLOOD COUNT 1647556   PLATELET COUNT           206 10e9/L  Unknown

 

          COMPLETE BLOOD COUNT 1288183   Mean Plt Volume           11.3 fL    Unknown

 

          COMPLETE BLOOD COUNT 8483772   Neut Auto           35.8 %    

7 Unknown

 

          COMPLETE BLOOD COUNT 7696254   Lymph Auto           51.6 %    20

17 Unknown

 

          COMPLETE BLOOD COUNT [id number]   Mono Auto           8.8 %     

7 Unknown

 

          COMPLETE BLOOD COUNT [id number]   RDW                 13.5 %    

7 Unknown

 

          COMPLETE BLOOD COUNT [id number]   Eos Auto            3.4 %     

7 Unknown

 

          COMPLETE BLOOD COUNT [id number]   Baso Auto           0.4 %     

7 Unknown

 

          COMPLETE BLOOD COUNT [id number]   Neutrophil Abs           2.40 10e9/L  Unknown

 

          COMPLETE BLOOD COUNT [id number]   Lymphocyte Abs           3.46 10e9/L  Unknown

 

          COMPLETE BLOOD COUNT [id number]   Monocyte Abs           0.59 10e9/L  Unknown

 

          COMPLETE BLOOD COUNT [id number]   Eosinophil Abs           0.23 10e9/L  Unknown

 

          COMPLETE BLOOD COUNT [id number]   RDW-SD              45.3 fL   

7 Unknown

 

          COMPLETE BLOOD COUNT 8459891   Basophil Abs           0.03 10e9/L  Unknown

 

          THYROID STIMULATING HORMONE 28681     TSH                 1.981 uIU/mL

 2017 Unknown

 

          COMPLETE BLOOD COUNT [id number]   WBC                 6.0 10e9/L 20

17 Unknown

 

          COMPLETE BLOOD COUNT [id number]   RBC                 4.29 10e12/L 2017 Unknown

 

          COMPLETE BLOOD COUNT [id number]   HEMOGLOBIN           12.9 g/dL 20

17 Unknown

 

          COMPLETE BLOOD COUNT [id number]   HEMATOCRIT           38.4 %    20

17 Unknown

 

          COMPLETE BLOOD COUNT 9777953   MCV                 89.5 fL   

7 Unknown

 

          COMPLETE BLOOD COUNT [id number]   MCH                 30.1 pg   

7 Unknown

 

          COMPLETE BLOOD COUNT [id number]   MCHC                33.6 g/dL 

7 Unknown

 

          COMPLETE BLOOD COUNT [id number]   PLATELET COUNT           181 10e9/L 2017 Unknown

 

          COMPLETE BLOOD COUNT [id number]   Mean Plt Volume           11.7 fL   2017 Unknown

 

          COMPLETE BLOOD COUNT [id number]   Neut Auto           36.9 %    

7 Unknown

 

          COMPLETE BLOOD COUNT 5154915   Lymph Auto           50.4 %    20

17 Unknown

 

          COMPLETE BLOOD COUNT [id number]   Mono Auto           9.0 %     

7 Unknown

 

          COMPLETE BLOOD COUNT [id number]   RDW                 13.7 %    

7 Unknown

 

          COMPLETE BLOOD COUNT [id number]   Eos Auto            3.4 %     

7 Unknown

 

          COMPLETE BLOOD COUNT [id number]   Baso Auto           0.3 %     

7 Unknown

 

          COMPLETE BLOOD COUNT [id number]   Neutrophil Abs           2.21 10e9/L  Unknown

 

          COMPLETE BLOOD COUNT 3111704   Lymphocyte Abs           3.02 10e9/L  Unknown

 

          COMPLETE BLOOD COUNT [id number]   Monocyte Abs           0.54 10e9/L 2017 Unknown

 

          COMPLETE BLOOD COUNT [id number]   Eosinophil Abs           0.20 10e9/L  Unknown

 

          COMPLETE BLOOD COUNT 8493721   RDW-SD              44.0 fL   

7 Unknown

 

          COMPLETE BLOOD COUNT 6440653   Basophil Abs           0.02 10e9/L 2017 Unknown

 

          GLYCOSYLATED HEMOGLOBIN TEST 86463     Hgb A1c   39460-5   5.4 %     0

3/09/2017 Unknown

 

          THYROID STIMULATING HORMONE 71800     TSH                 2.200 uIU/mL

 2017 Unknown

 

          GFR CALC  5100725   GFR Non Afr Amr           50 mL/min 2017 Unk

nown

 

          GFR CALC  1033440   GFR Afr Amr           >60 mL/min 2017 Unknow

n

 

          MEAN GLUC 9459496   Calc Mean Gluc           108 mg/dL 2017 Unkn

own

 

          COMPREHENSIVE METABOLIC 57618     AST                 18 U/L    2017 Unknown

 

          COMPREHENSIVE METABOLIC 19775     ALT                 10 U/L    2017 Unknown

 

          COMPREHENSIVE METABOLIC 36115     BUN                 20 mg/dL  2017 Unknown

 

          COMPREHENSIVE METABOLIC 77514     ALBUMIN             4.1 g/dL  2017 Unknown

 

          COMPREHENSIVE METABOLIC 94127     CHLORIDE            109 mmol/L  Unknown

 

          COMPREHENSIVE METABOLIC 23291     Bili Total           0.6 mg/dL  Unknown

 

          COMPREHENSIVE METABOLIC 20917     ALK PHOS            64 U/L    2017 Unknown

 

          COMPREHENSIVE METABOLIC 70394     SODIUM              141 mmol/L  Unknown

 

          COMPREHENSIVE METABOLIC 69509     CREATININE           1.06 mg/dL 2017 Unknown

 

          COMPREHENSIVE METABOLIC 12974     CALCIUM             9.9 mg/dL 2017 Unknown

 

          COMPREHENSIVE METABOLIC 75275     POTASSIUM           4.2 mmol/L  Unknown

 

          COMPREHENSIVE METABOLIC 87466     Total Protein           6.3 g/dL   Unknown

 

          COMPREHENSIVE METABOLIC 61961     Glucose             99 mg/dL  2017 Unknown

 

          COMPREHENSIVE METABOLIC 04599     Bicarbonate           21 mmol/L 2017 Unknown

 

          COMPREHENSIVE METABOLIC 12616     AGAP                11 mmol/L 2017 Unknown

 

          LIPID GROUP 03207     Cholesterol           169 mg/dL 2016 Unkno

wn

 

          LIPID GROUP 40303     Triglyceride           165 mg/dL 2016 Unkn

own

 

          LIPID GROUP 15505     HDL CHOLESTEROL           43 mg/dL  2016 U

nknown

 

          LIPID GROUP 38364     Chol/HDL Ratio           3.93 ratio 2016 U

nknown

 

          LIPID GROUP 67113     NON-HDL Chol           126 mg/dL 2016 Unkn

own

 

          LIPID GROUP 66757     LDL Cholesterol           93 mg/dL  2016 U

nknown

 

          COMPREHENSIVE METABOLIC 49692     AST                 18 U/L    2016 Unknown

 

          COMPREHENSIVE METABOLIC 85099     ALT                 10 U/L    2016 Unknown

 

          COMPREHENSIVE METABOLIC 30639     BUN                 20 mg/dL  2016 Unknown

 

          COMPREHENSIVE METABOLIC 26465     ALBUMIN             3.9 g/dL  2016 Unknown

 

          COMPREHENSIVE METABOLIC 07979     CHLORIDE            110 mmol/L  Unknown

 

          COMPREHENSIVE METABOLIC 68001     Bili Total           0.5 mg/dL  Unknown

 

          COMPREHENSIVE METABOLIC 86064     ALK PHOS            72 U/L    2016 Unknown

 

          COMPREHENSIVE METABOLIC 05914     SODIUM              141 mmol/L  Unknown

 

          COMPREHENSIVE METABOLIC 02540     CREATININE           1.12 mg/dL  Unknown

 

          COMPREHENSIVE METABOLIC 13340     CALCIUM             9.7 mg/dL 2016 Unknown

 

          COMPREHENSIVE METABOLIC 24946     POTASSIUM           4.4 mmol/L  Unknown

 

          COMPREHENSIVE METABOLIC 31995     Total Protein           6.2 g/dL   Unknown

 

          COMPREHENSIVE METABOLIC 00279     Glucose             90 mg/dL  2016 Unknown

 

          COMPREHENSIVE METABOLIC 69697     Bicarbonate           23 mmol/L  Unknown

 

          COMPREHENSIVE METABOLIC 14476     AGAP                8 mmol/L  2016 Unknown

 

          GFR CALC  1150189   GFR Non Afr Amr           47 mL/min 2016 Unk

nown

 

          GFR CALC  6578979   GFR Afr Amr           57 mL/min 2016 Unknown

 

          GLYCOSYLATED HEMOGLOBIN TEST 81785     Hgb A1c   65376-3   5.5 %     0

2016 Unknown

 

          THYROID STIMULATING HORMONE 99440     TSH                 2.537 uIU/mL

 2016 Unknown

 

          FREE T4   91773     T4 Free             1.36 ng/dL 2016 Unknown

 

          COMPLETE BLOOD COUNT [id number]   WBC                 6.8 10e9/L 20

16 Unknown

 

          COMPLETE BLOOD COUNT [id number]   RBC                 4.20 10e12/L 2016 Unknown

 

          COMPLETE BLOOD COUNT 3500145   HEMOGLOBIN           12.5 g/dL 20

16 Unknown

 

          COMPLETE BLOOD COUNT 2650085   HEMATOCRIT           38.0 %    20

16 Unknown

 

          COMPLETE BLOOD COUNT 4855919   MCV                 90.5 fL   

6 Unknown

 

          COMPLETE BLOOD COUNT 6270081   MCH                 29.8 pg   

6 Unknown

 

          COMPLETE BLOOD COUNT 9708551   MCHC                32.9 g/dL 

6 Unknown

 

          COMPLETE BLOOD COUNT 9554697   PLATELET COUNT           197 10e9/L  Unknown

 

          COMPLETE BLOOD COUNT 4512099   Mean Plt Volume           11.7 fL    Unknown

 

          COMPLETE BLOOD COUNT 2585613   Neut Auto           41.3 %    

6 Unknown

 

          COMPLETE BLOOD COUNT 5725768   Lymph Auto           47.1 %    20

16 Unknown

 

          COMPLETE BLOOD COUNT [id number]   Mono Auto           7.8 %     

6 Unknown

 

          COMPLETE BLOOD COUNT 7175264   RDW                 13.8 %    

6 Unknown

 

          COMPLETE BLOOD COUNT 8375888   Eos Auto            3.4 %     

6 Unknown

 

          COMPLETE BLOOD COUNT 4089737   Baso Auto           0.4 %     

6 Unknown

 

          COMPLETE BLOOD COUNT 8395202   Neutrophil Abs           2.81 10e9/L  Unknown

 

          COMPLETE BLOOD COUNT 4575763   Lymphocyte Abs           3.20 10e9/L  Unknown

 

          COMPLETE BLOOD COUNT 0320017   Monocyte Abs           0.53 10e9/L  Unknown

 

          COMPLETE BLOOD COUNT 6943216   Eosinophil Abs           0.23 10e9/L  Unknown

 

          COMPLETE BLOOD COUNT 5447065   RDW-SD              44.4 fL   

6 Unknown

 

          COMPLETE BLOOD COUNT 2585770   Basophil Abs           0.03 10e9/L  Unknown

 

          MEAN GLUC 3465584   Calc Mean Gluc           111 mg/dL 2016 Unkn

own

 

          [place] [address]     Glucose             89 MG/DL   Unknown

 

          [place] [address]     CREATININE           1.12 MG/DL  Unknown

 

          [place] [address]     BUN                 20 MG/DL   Unknown

 

          [place] [address]     SODIUM              139 MMOL/L  Unknown

 

          [place] [address]     POTASSIUM           4.6 MMOL/L  Unknown

 

          [place] [address]     CHLORIDE            108 MMOL/L  Unknown

 

          [place] [address]     BICARB              26 MMOL/L  Unknown

 

          [place] [address]     ANION GAP           5 MEQ/L    Unknown

 

          [place] [address]     CALCIUM             10.0 MG/DL  Unknown

 

          GFR CALC  6598705   GFR AA              57.0L ML/MIN 2015 Unknow

n

 

          GFR CALC  3646648   GFR NON-AA           47.0L ML/MIN 2015 Unkno

wn

 

          THYROID STIMULATING HORMONE 16852     TSH                 2.378 uIU/ML

 09/10/2015 Unknown

 

          COMPLETE BLOOD COUNT [id number]   WBC                 6.4 10e9/L 09/10/20

15 Unknown

 

          COMPLETE BLOOD COUNT [id number]   RBC                 3.99 10e12/L 09/10/

2015 Unknown

 

          COMPLETE BLOOD COUNT 1357640   HGB                 11.9 g/dL 09/10/201

5 Unknown

 

          COMPLETE BLOOD COUNT 7752281   HCT DET             36.9 %    09/10/201

5 Unknown

 

          COMPLETE BLOOD COUNT [id number]   MCV                 92.5 fL   09/10/201

5 Unknown

 

          COMPLETE BLOOD COUNT 7708131   MCH                 29.8 pg   09/10/201

5 Unknown

 

          COMPLETE BLOOD COUNT [id number]   MCHC                32.2 g/dL 09/10/201

5 Unknown

 

          COMPLETE BLOOD COUNT [id number]   PLT                 172 10e9/L 09/10/20

15 Unknown

 

          COMPLETE BLOOD COUNT 7781001   MPV                 11.7 fL   09/10/201

5 Unknown

 

          COMPLETE BLOOD COUNT [id number]   ARIK %               40.4 %    09/10/201

5 Unknown

 

          COMPLETE BLOOD COUNT 7630609   LY %                48.0 %    09/10/201

5 Unknown

 

          COMPLETE BLOOD COUNT [id number]   MON %               8.3 %     09/10/201

5 Unknown

 

          COMPLETE BLOOD COUNT [id number]   EOS  %              2.8 %     09/10/201

5 Unknown

 

          COMPLETE BLOOD COUNT [id number]   BASO %              0.5 %     09/10/201

5 Unknown

 

          COMPLETE BLOOD COUNT [id number]   RDW                 13.6 %    09/10/201

5 Unknown

 

          COMPLETE BLOOD COUNT [id number]   ABS ARIK             2.59 10e9/L 09/10/2

015 Unknown

 

          COMPLETE BLOOD COUNT [id number]   ABS LYMPH           3.07 10e9/L 09/10/2

015 Unknown

 

          COMPLETE BLOOD COUNT [id number]   ABS MONO            0.53 10e9/L 09/10/2

015 Unknown

 

          COMPLETE BLOOD COUNT [id number]   ABS EOS             0.18 10e9/L 09/10/2

015 Unknown

 

          COMPLETE BLOOD COUNT [id number]   ABS BASO            0.03 10e9/L 09/10/2

015 Unknown

 

          COMPLETE BLOOD COUNT [id number]   RDW-SD              44.9 fL   09/10/201

5 Unknown

 

          LIPID GROUP [id number]     HDL TEST            42 MG/DL  09/10/2015 Unknown

 

          LIPID GROUP 43164     TRIG                177 MG/DL 09/10/2015 Unknown

 

          LIPID GROUP 69450     TEST LDL            72 MG/DL  09/10/2015 Unknown

 

          LIPID GROUP 79164     CHOL                149 MG/DL 09/10/2015 Unknown

 

          LIPID GROUP 61945     RCHOL/HDL           3.55 RATIO 09/10/2015 Unknow

n

 

          LIPID GROUP 30520     NON-HDL CH           107 MG/DL 09/10/2015 Unknow

n

 

          GLYCOSYLATED HEMOGLOBIN TEST 46111     A1C HPLC  81845-5   5.5 %     0

9/10/2015 Unknown

 

          FREE T4   42168     FREE T4             1.39 NG/DL 09/10/2015 Unknown

 

          GFR CALC  8447436   GFR AA              55.0L ML/MIN 09/10/2015 Unknow

n

 

          GFR CALC  5217249   GFR NON-AA           46.0L ML/MIN 09/10/2015 Unkno

wn

 

          COMPREHENSIVE METABOLIC 15677     AST                 17 U/L    09/10/

2015 Unknown

 

          COMPREHENSIVE METABOLIC 74172     ALT                 10 IU/L   09/10/

2015 Unknown

 

          COMPREHENSIVE METABOLIC 10917     BUN                 20 MG/DL  09/10/

2015 Unknown

 

          COMPREHENSIVE METABOLIC 68464     ALBUMIN             3.9 GM/DL 09/10/

2015 Unknown

 

          COMPREHENSIVE METABOLIC 80505     CHLORIDE            111 MMOL/L 09/10

/2015 Unknown

 

          COMPREHENSIVE METABOLIC 39637     BILI TOT            0.4 MG/DL 09/10/

2015 Unknown

 

          COMPREHENSIVE METABOLIC 62193     ALK PHOS            70 U/L    09/10/

2015 Unknown

 

          COMPREHENSIVE METABOLIC 10526     SODIUM              142 MMOL/L 09/10

/2015 Unknown

 

          COMPREHENSIVE METABOLIC 50514     CREATININE           1.16 MG/DL  Unknown

 

          COMPREHENSIVE METABOLIC 94070     CALCIUM             9.4 MG/DL 09/10/

2015 Unknown

 

          COMPREHENSIVE METABOLIC 98002     POTASSIUM           4.6 MMOL/L 09/10

/2015 Unknown

 

          COMPREHENSIVE METABOLIC 89183     PROT TOT            6.2 GM/DL 09/10/

2015 Unknown

 

          COMPREHENSIVE METABOLIC 87203     Glucose             90 MG/DL  09/10/

2015 Unknown

 

          COMPREHENSIVE METABOLIC 65037     BICARB              24 MMOL/L 09/10/

2015 Unknown

 

          COMPREHENSIVE METABOLIC 44257     ANION GAP           7 MEQ/L   09/10/

2015 Unknown

 

          THYROID STIMULATING HORMONE 74418     TSH                 2.427 uIU/ML

 2015 Unknown

 

          LIPID GROUP 80379     HDL TEST            47 MG/DL  2015 Unknown

 

          LIPID GROUP 63367     TRIG                145 MG/DL 2015 Unknown

 

          LIPID GROUP 76759     TEST LDL            73 MG/DL  2015 Unknown

 

          LIPID GROUP 49450     CHOL                149 MG/DL 2015 Unknown

 

          LIPID GROUP 90301     RCHOL/HDL           3.17 RATIO 2015 Unknow

n

 

          LIPID GROUP 86590     NON-HDL CH           102 MG/DL 2015 Unknow

n

 

          COMPREHENSIVE METABOLIC 17424     AST                 17 U/L    2015 Unknown

 

          COMPREHENSIVE METABOLIC 55915     ALT                 9 IU/L    2015 Unknown

 

          COMPREHENSIVE METABOLIC 11366     BUN                 19 MG/DL  2015 Unknown

 

          COMPREHENSIVE METABOLIC 14432     ALBUMIN             4.3 GM/DL 2015 Unknown

 

          COMPREHENSIVE METABOLIC 14586     CHLORIDE            108 MMOL/L  Unknown

 

          COMPREHENSIVE METABOLIC 20902     BILI TOT            0.5 MG/DL 2015 Unknown

 

          COMPREHENSIVE METABOLIC 33293     ALK PHOS            68 U/L    2015 Unknown

 

          COMPREHENSIVE METABOLIC 48329     SODIUM              140 MMOL/L  Unknown

 

          COMPREHENSIVE METABOLIC 81295     CREATININE           1.08 MG/DL 2015 Unknown

 

          COMPREHENSIVE METABOLIC 65831     CALCIUM             9.9 MG/DL 2015 Unknown

 

          COMPREHENSIVE METABOLIC 19219     POTASSIUM           4.3 MMOL/L  Unknown

 

          COMPREHENSIVE METABOLIC 86760     PROT TOT            7.2 GM/DL 2015 Unknown

 

          COMPREHENSIVE METABOLIC 32747     Glucose             94 MG/DL  2015 Unknown

 

          COMPREHENSIVE METABOLIC 78465     BICARB              26 MMOL/L 2015 Unknown

 

          COMPREHENSIVE METABOLIC 32693     ANION GAP           6 MEQ/L   2015 Unknown

 

          GFR CALC  7067700   GFR AA              60.0L ML/MIN 2015 Unknow

n

 

          GFR CALC  3239324   GFR NON-AA           49.0L ML/MIN 2015 Unkno

wn

 

          GLYCOSYLATED HEMOGLOBIN TEST 76885     A1C HPLC  57476-8   5.6 %     0

3/06/2015 Unknown

 

          COMPLETE BLOOD COUNT 9013253   WBC                 7.2 10e9/L 20

15 Unknown

 

          COMPLETE BLOOD COUNT [id number]   RBC                 4.28 10e12/L 2015 Unknown

 

          COMPLETE BLOOD COUNT 9452426   HGB                 12.8 g/dL 

5 Unknown

 

          COMPLETE BLOOD COUNT 3036407   HCT DET             39.3 %    

5 Unknown

 

          COMPLETE BLOOD COUNT 0915765   MCV                 91.8 fL   

5 Unknown

 

          COMPLETE BLOOD COUNT 2725280   MCH                 29.9 pg   

5 Unknown

 

          COMPLETE BLOOD COUNT 6164580   MCHC                32.6 g/dL 

5 Unknown

 

          COMPLETE BLOOD COUNT 7124812   PLT                 189 10e9/L 20

15 Unknown

 

          COMPLETE BLOOD COUNT 4330031   MPV                 11.2 fL   

5 Unknown

 

          COMPLETE BLOOD COUNT 0828148   ARIK %               38.0 %    

5 Unknown

 

          COMPLETE BLOOD COUNT 4364429   LY %                51.0 %    

5 Unknown

 

          COMPLETE BLOOD COUNT 4799180   MON %               7.7 %     

5 Unknown

 

          COMPLETE BLOOD COUNT [id number]   EOS  %              2.9 %     

5 Unknown

 

          COMPLETE BLOOD COUNT 6318466   BASO %              0.4 %     

5 Unknown

 

          COMPLETE BLOOD COUNT 5087925   RDW                 14.0 %    

5 Unknown

 

          COMPLETE BLOOD COUNT [id number]   ABS ARIK             2.74 10e9/L 

015 Unknown

 

          COMPLETE BLOOD COUNT [id number]   ABS LYMPH           3.67 10e9/L 

015 Unknown

 

          COMPLETE BLOOD COUNT [id number]   ABS MONO            0.55 10e9/L 

015 Unknown

 

          COMPLETE BLOOD COUNT [id number]   ABS EOS             0.21 10e9/L 

015 Unknown

 

          COMPLETE BLOOD COUNT 1326749   ABS BASO            0.03 10e9/L 

015 Unknown

 

          COMPLETE BLOOD COUNT [id number]   RDW-SD              46.1 fL   

5 Unknown

 

          FREE T4   60500     FREE T4             1.14 NG/DL 2015 Unknown

 

          GLYCOSYLATED HEMOGLOBIN TEST 07001     A1C HPLC  01778-2   5.2 %     0

2014 Unknown

 

          FREE T4   90328     FREE T4             1.40 NG/DL 2014 Unknown

 

          GFR CALC  7935160   GFR AA              >60 ML/MIN 2014 Unknown

 

          GFR CALC  5697456   GFR NON-AA           52.0L ML/MIN 2014 Unkno

wn

 

          COMPREHENSIVE METABOLIC 46292     AST                 15 U/L    2014 Unknown

 

          COMPREHENSIVE METABOLIC 78929     ALT                 9 IU/L    2014 Unknown

 

          COMPREHENSIVE METABOLIC 15958     BUN                 17 MG/DL  2014 Unknown

 

          COMPREHENSIVE METABOLIC 95095     ALBUMIN             4.0 GM/DL 2014 Unknown

 

          COMPREHENSIVE METABOLIC 09682     CHLORIDE            112 MMOL/L  Unknown

 

          COMPREHENSIVE METABOLIC 49276     BILI TOT            0.5 MG/DL 2014 Unknown

 

          COMPREHENSIVE METABOLIC 78521     ALK PHOS            66 U/L    2014 Unknown

 

          COMPREHENSIVE METABOLIC 34591     SODIUM              140 MMOL/L  Unknown

 

          COMPREHENSIVE METABOLIC 56587     CREATININE           1.03 MG/DL  Unknown

 

          COMPREHENSIVE METABOLIC 13788     CALCIUM             9.5 MG/DL 2014 Unknown

 

          COMPREHENSIVE METABOLIC 41394     POTASSIUM           4.1 MMOL/L  Unknown

 

          COMPREHENSIVE METABOLIC 94555     PROT TOT            6.2 GM/DL 2014 Unknown

 

          COMPREHENSIVE METABOLIC 42787     Glucose             102 MG/DL 2014 Unknown

 

          COMPREHENSIVE METABOLIC 59563     BICARB              23 MMOL/L 2014 Unknown

 

          COMPREHENSIVE METABOLIC 43560     ANION GAP           5 MEQ/L   2014 Unknown

 

          THYROID STIMULATING HORMONE 09757     TSH                 2.074 uIU/ML

 2014 Unknown

 

          VITAMIN B 12 FOLIC ACID 47430|21249 VIT B 12            423 PG/ML  Unknown

 

          VITAMIN B 12 FOLIC ACID 93468|49582 FOLIC ACID           19.7 NG/ML  Unknown

 

          LIPID GROUP [id number]     HDL TEST            40 MG/DL  2014 Unknown

 

          LIPID GROUP [id number]     TRIG                145 MG/DL 2014 Unknown

 

          LIPID GROUP [id number]     TEST LDL            81 MG/DL  2014 Unknown

 

          LIPID GROUP [id number]     CHOL                150 MG/DL 2014 Unknown

 

          LIPID GROUP [id number]     RCHOL/HDL           3.75 RATIO 2014 Unknow

n

 

          COMPLETE BLOOD COUNT [id number]   WBC                 6.0 10e9/L 20

14 Unknown

 

          COMPLETE BLOOD COUNT [id number]   RBC                 4.26 10e12/L 2014 Unknown

 

          COMPLETE BLOOD COUNT [id number]   HGB                 12.7 g/dL 

4 Unknown

 

          COMPLETE BLOOD COUNT 9977548   HCT DET             38.7 %    

4 Unknown

 

          COMPLETE BLOOD COUNT 0020015   MCV                 90.8 fL   

4 Unknown

 

          COMPLETE BLOOD COUNT 2091441   MCH                 29.8 pg   

4 Unknown

 

          COMPLETE BLOOD COUNT 0049884   MCHC                32.8 g/dL 

4 Unknown

 

          COMPLETE BLOOD COUNT [id number]   PLT                 178 10e9/L 20

14 Unknown

 

          COMPLETE BLOOD COUNT 3734516   MPV                 11.7 fL   

4 Unknown

 

          COMPLETE BLOOD COUNT [id number]   ARIK %               30.5 %    

4 Unknown

 

          COMPLETE BLOOD COUNT 5040800   LY %                55.4 %    

4 Unknown

 

          COMPLETE BLOOD COUNT 0229501   MON %               9.0 %     

4 Unknown

 

          COMPLETE BLOOD COUNT 0034384   EOS  %              4.4 %     

4 Unknown

 

          COMPLETE BLOOD COUNT 8671457   BASO %              0.7 %     

4 Unknown

 

          COMPLETE BLOOD COUNT 1337596   RDW                 13.3 %    

4 Unknown

 

          COMPLETE BLOOD COUNT 3544876   ABS ARIK             1.83 10e9/L 

014 Unknown

 

          COMPLETE BLOOD COUNT [id number]   ABS LYMPH           3.32 10e9/L 

014 Unknown

 

          COMPLETE BLOOD COUNT [id number]   ABS MONO            0.54 10e9/L 

014 Unknown

 

          COMPLETE BLOOD COUNT 9411912   ABS EOS             0.26 10e9/L 

014 Unknown

 

          COMPLETE BLOOD COUNT 1347533   ABS BASO            0.04 10e9/L 

014 Unknown

 

          COMPLETE BLOOD COUNT 8594160   RDW-SD              43.2 fL   

4 Unknown

 

          HEMOGLOBIN A1C (GLYCOSYLATED) 5147514   A1C HPLC  32450-9   5.5 %     

2012 Unknown

 

          COMPLETE BLOOD COUNT 5479354   WBC                 6.0 10e9/L 20

12 Unknown

 

          COMPLETE BLOOD COUNT 5532650   RBC                 4.22 10e12/L 2012 Unknown

 

          COMPLETE BLOOD COUNT 4042916   HGB                 12.4 g/dL 

2 Unknown

 

          COMPLETE BLOOD COUNT 7957285   HCT DET             38.2 %    

2 Unknown

 

          COMPLETE BLOOD COUNT 7629643   MCV                 90.5 fL   

2 Unknown

 

          COMPLETE BLOOD COUNT 7348108   MCH                 29.4 pg   

2 Unknown

 

          COMPLETE BLOOD COUNT 4973656   MCHC                32.5 g/dL 

2 Unknown

 

          COMPLETE BLOOD COUNT 1400687   PLT                 187 10e9/L 20

12 Unknown

 

          COMPLETE BLOOD COUNT 2115743   MPV                 11.5 fL   

2 Unknown

 

          COMPLETE BLOOD COUNT 1085298   ARIK %               36.4 %    

2 Unknown

 

          COMPLETE BLOOD COUNT 7877222   LY %                51.0 %    

2 Unknown

 

          COMPLETE BLOOD COUNT 4637177   MON %               8.7 %     

2 Unknown

 

          COMPLETE BLOOD COUNT 0485766   EOS  %              3.2 %     

2 Unknown

 

          COMPLETE BLOOD COUNT 0827819   BASO %              0.7 %     

2 Unknown

 

          COMPLETE BLOOD COUNT 3171475   RDW                 13.7 %    

2 Unknown

 

          COMPLETE BLOOD COUNT 5945427   ABS ARIK             2.18 10e9/L 

012 Unknown

 

          COMPLETE BLOOD COUNT 9328383   ABS LYMPH           3.06 10e9/L 

012 Unknown

 

          COMPLETE BLOOD COUNT 6035070   ABS MONO            0.52 10e9/L 

012 Unknown

 

          COMPLETE BLOOD COUNT 5618232   ABS EOS             0.19 10e9/L 

012 Unknown

 

          COMPLETE BLOOD COUNT 1000050   ABS BASO            0.04 10e9/L 

012 Unknown

 

          COMPLETE BLOOD COUNT 8898950   RDW-SD              44.3 fL   

2 Unknown

 

          LIPID GROUP [id number]     HDL TEST            42 MG/DL  2012 Unknown

 

          LIPID GROUP [id number]     TRIG                156 MG/DL 2012 Unknown

 

          LIPID GROUP 84563     TEST LDL            80 MG/DL  2012 Unknown

 

          LIPID GROUP 84393     CHOL                153 MG/DL 2012 Unknown

 

          LIPID GROUP [id number]     RCHOL/HDL           3.64 RATIO 2012 Unknow

n

 

          FREE T4   84801     FREE T4             1.22 NG/DL 2012 Unknown

 

          COMPREHENSIVE METABOLIC 15071     AST                 20 U/L    2012 Unknown

 

          COMPREHENSIVE METABOLIC 01703     ALT                 11 IU/L   2012 Unknown

 

          COMPREHENSIVE METABOLIC 47688     BUN                 19 MG/DL  2012 Unknown

 

          COMPREHENSIVE METABOLIC 07582     ALBUMIN             4.3 GM/DL 2012 Unknown

 

          COMPREHENSIVE METABOLIC 84140     CHLORIDE            109 MMOL/L  Unknown

 

          COMPREHENSIVE METABOLIC 97247     BILI TOT            0.6 MG/DL 2012 Unknown

 

          COMPREHENSIVE METABOLIC 81211     ALK PHOS            84 U/L    2012 Unknown

 

          COMPREHENSIVE METABOLIC 57747     SODIUM              142 MMOL/L  Unknown

 

          COMPREHENSIVE METABOLIC 40209     CREATININE           1.09 MG/DL  Unknown

 

          COMPREHENSIVE METABOLIC 26698     CALCIUM             9.8 MG/DL 2012 Unknown

 

          COMPREHENSIVE METABOLIC 34524     POTASSIUM           4.2 MMOL/L  Unknown

 

          COMPREHENSIVE METABOLIC 42528     PROT TOT            6.4 GM/DL 2012 Unknown

 

          COMPREHENSIVE METABOLIC 21650     Glucose             89 MG/DL  2012 Unknown

 

          COMPREHENSIVE METABOLIC 94419     BICARB              25 MMOL/L 2012 Unknown

 

          COMPREHENSIVE METABOLIC 02745     ANION GAP           8 MEQ/L   2012 Unknown

 

          GFR CALC  1464145   GFR AA              60.0L ML/MIN 2012 Unknow

n

 

          GFR CALC  6560322   GFR NON-AA           49.0L ML/MIN 2012 Unkno

wn

 

          THYROID STIMULATING HORMONE 12318     TSH                 2.450 uIU/ML

 2012 Unknown

 

          COMPREHENSIVE METABOLIC 91395     AST                 22 U/L    2012 Unknown

 

          COMPREHENSIVE METABOLIC 20591     ALT                 14 IU/L   2012 Unknown

 

          COMPREHENSIVE METABOLIC 35402     BUN                 21 MG/DL  2012 Unknown

 

          COMPREHENSIVE METABOLIC 98926     ALBUMIN             4.3 GM/DL 2012 Unknown

 

          COMPREHENSIVE METABOLIC 40373     CHLORIDE            106 MMOL/L  Unknown

 

          COMPREHENSIVE METABOLIC 47324     BILI TOT            0.4 MG/DL 2012 Unknown

 

          COMPREHENSIVE METABOLIC 29868     ALK PHOS            80 U/L    2012 Unknown

 

          COMPREHENSIVE METABOLIC 71689     SODIUM              141 MMOL/L  Unknown

 

          COMPREHENSIVE METABOLIC 98147     CREATININE           1.13 MG/DL  Unknown

 

          COMPREHENSIVE METABOLIC 73922     CALCIUM             9.4 MG/DL 2012 Unknown

 

          COMPREHENSIVE METABOLIC 63549     POTASSIUM           4.3 MMOL/L  Unknown

 

          COMPREHENSIVE METABOLIC 57966     PROT TOT            6.7 GM/DL 2012 Unknown

 

          COMPREHENSIVE METABOLIC 27300     Glucose             98 MG/DL  2012 Unknown

 

          COMPREHENSIVE METABOLIC 67273     BICARB              25 MMOL/L 2012 Unknown

 

          COMPREHENSIVE METABOLIC 24700     ANION GAP           10 MEQ/L  2012 Unknown

 

          LIPID GROUP 51269     HDL TEST            44 MG/DL  2012 Unknown

 

          LIPID GROUP 98636     TRIG                164 MG/DL 2012 Unknown

 

          LIPID GROUP 07899     TEST LDL            98 MG/DL  2012 Unknown

 

          LIPID GROUP 69442     CHOL                175 MG/DL 2012 Unknown

 

          LIPID GROUP 52463     RCHOL/HDL           3.98 RATIO 2012 Unknow

n

 

          COMPLETE BLOOD COUNT 04743     WBC                 6.7 10e9/L 20

12 Unknown

 

          COMPLETE BLOOD COUNT 14992     RBC                 4.36 10e12/L 2012 Unknown

 

          COMPLETE BLOOD COUNT 78219     HGB                 12.9 g/dL 

2 Unknown

 

          COMPLETE BLOOD COUNT 41144     HCT DET             39.4 %    

2 Unknown

 

          COMPLETE BLOOD COUNT 11996     MCV                 90.4 fL   

2 Unknown

 

          COMPLETE BLOOD COUNT 92124     MCH                 29.6 pg   

2 Unknown

 

          COMPLETE BLOOD COUNT 05566     MCHC                32.7 g/dL 

2 Unknown

 

          COMPLETE BLOOD COUNT 88885     PLT                 184 10e9/L 20

12 Unknown

 

          COMPLETE BLOOD COUNT 66066     MPV                 10.9 fL   

2 Unknown

 

          COMPLETE BLOOD COUNT 48507     ARIK %               41.5 %    

2 Unknown

 

          COMPLETE BLOOD COUNT 76975     LY %                45.7 %    

2 Unknown

 

          COMPLETE BLOOD COUNT 94996     MON %               9.4 %     

2 Unknown

 

          COMPLETE BLOOD COUNT 25883     EOS  %              3.0 %     

2 Unknown

 

          COMPLETE BLOOD COUNT 82477     BASO %              0.4 %     

2 Unknown

 

          COMPLETE BLOOD COUNT 06357     RDW                 13.2 %    

2 Unknown

 

          COMPLETE BLOOD COUNT 20274     ABS ARIK             2.78 10e9/L 

012 Unknown

 

          COMPLETE BLOOD COUNT 97042     ABS LYMPH           3.06 10e9/L 

012 Unknown

 

          COMPLETE BLOOD COUNT 58949     ABS MONO            0.63 10e9/L 

012 Unknown

 

          COMPLETE BLOOD COUNT 30620     ABS EOS             0.20 10e9/L 

012 Unknown

 

          COMPLETE BLOOD COUNT 97734     ABS BASO            0.03 10e9/L 

012 Unknown

 

          COMPLETE BLOOD COUNT 60630     RDW-SD              42.3 fL   

2 Unknown

 

          GFR CALC  6070090   GFR AA              57.0L ML/MIN 2012 Unknow

n

 

          GFR CALC  5067233   GFR NON-AA           47.0L ML/MIN 2012 Unkno

wn

 

          THYROID STIMULATING HORMONE 75415     TSH                 2.663 uIU/ML

 2012 Unknown

 

          FREE T4   86847     FREE T4             1.15 NG/DL 2012 Unknown

 

          THYROID STIMULATING HORMONE 74935     TSH                 1.908 uIU/ML

 2011 Unknown

 

          COMPLETE BLOOD COUNT 70472     WBC                 6.4 10e9/L 20

11 Unknown

 

          COMPLETE BLOOD COUNT 26255     RBC                 3.92 10e12/L 2011 Unknown

 

          COMPLETE BLOOD COUNT 46865     HGB                 11.8 g/dL 

1 Unknown

 

          COMPLETE BLOOD COUNT 95953     HCT DET             36.0 %    

1 Unknown

 

          COMPLETE BLOOD COUNT 55960     MCV                 91.8 fL   

1 Unknown

 

          COMPLETE BLOOD COUNT 67320     MCH                 30.1 pg   

1 Unknown

 

          COMPLETE BLOOD COUNT 16789     MCHC                32.8 g/dL 

1 Unknown

 

          COMPLETE BLOOD COUNT 68319     PLT                 176 10e9/L 20

11 Unknown

 

          COMPLETE BLOOD COUNT 15516     MPV                 11.4 fL   

1 Unknown

 

          COMPLETE BLOOD COUNT 66450     ARIK %               50.4 %    

1 Unknown

 

          COMPLETE BLOOD COUNT 09417     LY %                35.5 %    

1 Unknown

 

          COMPLETE BLOOD COUNT 57452     MON %               10.2 %    

1 Unknown

 

          COMPLETE BLOOD COUNT 85049     EOS  %              3.3 %     

1 Unknown

 

          COMPLETE BLOOD COUNT 80004     BASO %              0.6 %     

1 Unknown

 

          COMPLETE BLOOD COUNT 18530     RDW                 13.7 %    

1 Unknown

 

          COMPLETE BLOOD COUNT 07077     ABS ARIK             3.23 10e9/L 

011 Unknown

 

          COMPLETE BLOOD COUNT 92642     ABS LYMPH           2.27 10e9/L 

011 Unknown

 

          COMPLETE BLOOD COUNT 01938     ABS MONO            0.65 10e9/L 

011 Unknown

 

          COMPLETE BLOOD COUNT 78321     ABS EOS             0.21 10e9/L 

011 Unknown

 

          COMPLETE BLOOD COUNT 97108     ABS BASO            0.04 10e9/L 

011 Unknown

 

          COMPLETE BLOOD COUNT 89014     RDW-SD              45.3 fL   

1 Unknown

 

          GFR CALC  0959086   GFR AA              >60 ML/MIN 2011 Unknown

 

          GFR CALC  4019340   GFR NON-AA           53.0L ML/MIN 2011 Unkno

wn

 

          FREE T4   51492     FREE T4             1.20 NG/DL 2011 Unknown

 

          COMPREHENSIVE METABOLIC 54668     AST                 17 U/L    2011 Unknown

 

          COMPREHENSIVE METABOLIC 23960     ALT                 9 IU/L    2011 Unknown

 

          COMPREHENSIVE METABOLIC 37554     BUN                 16 MG/DL  2011 Unknown

 

          COMPREHENSIVE METABOLIC 20944     ALBUMIN             4.0 GM/DL 2011 Unknown

 

          COMPREHENSIVE METABOLIC 90097     CHLORIDE            108 MMOL/L  Unknown

 

          COMPREHENSIVE METABOLIC 66668     BILI TOT            0.5 MG/DL 2011 Unknown

 

          COMPREHENSIVE METABOLIC 83299     ALK PHOS            76 U/L    2011 Unknown

 

          COMPREHENSIVE METABOLIC 51917     SODIUM              139 MMOL/L  Unknown

 

          COMPREHENSIVE METABOLIC 86316     CREATININE           1.02 MG/DL 2011 Unknown

 

          COMPREHENSIVE METABOLIC 33582     CALCIUM             9.2 MG/DL 2011 Unknown

 

          COMPREHENSIVE METABOLIC 39272     POTASSIUM           4.5 MMOL/L  Unknown

 

          COMPREHENSIVE METABOLIC 39324     PROT TOT            6.1 GM/DL 2011 Unknown

 

          COMPREHENSIVE METABOLIC 86781     Glucose             93 MG/DL  2011 Unknown

 

          COMPREHENSIVE METABOLIC 74306     BICARB              26 MMOL/L 2011 Unknown

 

          COMPREHENSIVE METABOLIC 58942     ANION GAP           5 MEQ/L   2011 Unknown

 

          LIPID GROUP 97974     HDL TEST            46 MG/DL  2011 Unknown

 

          LIPID GROUP 27212     TRIG                102 MG/DL 2011 Unknown

 

          LIPID GROUP 79622     TEST LDL            88 MG/DL  2011 Unknown

 

          LIPID GROUP 44650     CHOL                154 MG/DL 2011 Unknown

 

          LIPID GROUP 44328     RCHOL/HDL           3.35 RATIO 2011 Unknow

n







Procedures





                    Procedure           Codes               Date

 

                    ROUTINE VENIPUNCTURE CPT-4: 14064        2020

 

                    ASSAY THYROID STIM HORMONE CPT-4: 62971        2020

 

                    COMPLETE CBC W/AUTO DIFF WBC CPT-4: 99856        2020

 

                    COMPREHEN METABOLIC PANEL CPT-4: 16105        2020

 

                    ROUTINE VENIPUNCTURE CPT-4: 99390        2019

 

                    LIPID PANEL         CPT-4: 13901        2019

 

                    FLU VACC PRSV FREE INC ANTIG 65 AND OLDER CPT-4: 07256      

  10/22/2019

 

                    FLU VACC PRSV FREE INC ANTIG 65 AND OLDER CPT-4: 81424      

  10/22/2019

 

                    ADMIN INFLUENZA VIRUS VAC CPT-4:         10/22/2019

 

                    COMPREHEN METABOLIC PANEL CPT-4: 99610        10/11/2019

 

                    ROUTINE VENIPUNCTURE CPT-4: 15567        10/11/2019

 

                    ROUTINE VENIPUNCTURE CPT-4: 60426        06/10/2019

 

                    ASSAY THYROID STIM HORMONE CPT-4: 10224        06/10/2019

 

                    COMPREHEN METABOLIC PANEL CPT-4: 07149        06/10/2019

 

                    COMPLETE CBC W/AUTO DIFF WBC CPT-4: 84926        06/10/2019

 

                    URINALYSIS NONAUTO W/O SCOPE CPT-4: 30832        2019

 

                    URINE CULTURE/ COLONY COUNT CPT-4: 11760        2019

 

                    URINE CULTURE/ COLONY COUNT CPT-4: 28867        10/02/2018

 

                    ROUTINE VENIPUNCTURE CPT-4: 22793        2018

 

                    ASSAY OF FREE THYROXINE CPT-4: 81376        2018

 

                    ASSAY THYROID STIM HORMONE CPT-4: 47085        2018

 

                    COMPLETE CBC W/AUTO DIFF WBC CPT-4: 10819        2018

 

                    METABOLIC PANEL TOTAL CA CPT-4: 39199        2018

 

                    FLU VACC PRSV FREE INC ANTIG 65 AND OLDER CPT-4: 01882      

  2018

 

                    ASSAY, GLUCOSE, BLOOD QUANT CPT-4: 81981        2018

 

                    ADMIN INFLUENZA VIRUS VAC CPT-4:         2018

 

                    ROUTINE VENIPUNCTURE CPT-4: 50876        2018

 

                    COMPREHEN METABOLIC PANEL CPT-4: 77285        2018

 

                    COMPLETE CBC W/AUTO DIFF WBC CPT-4: 58458        2018

 

                    A1C HPLC            CPT-4: 01177        2018

 

                    ASSAY OF TROPONIN QUANT CPT-4: 71803        2018

 

                    LIPID PANEL         CPT-4: 82685        2018

 

                    THER/PROPH/DIAG INJ SC/IM CPT-4: 39297        2018

 

                    TRIAMCINOLONE ACET INJ NOS CPT-4:         2018

 

                    URINALYSIS NONAUTO W/O SCOPE CPT-4: 12385        2018

 

                    URINE CULTURE/ COLONY COUNT CPT-4: 53065        2018

 

                    FLU VACC PRSV FREE INC ANTIG 65 AND OLDER CPT-4: 45725      

  10/06/2017

 

                    ADMIN INFLUENZA VIRUS VAC CPT-4:         10/06/2017

 

                    ROUTINE VENIPUNCTURE CPT-4: 71789        2017

 

                    COMPREHEN METABOLIC PANEL CPT-4: 93057        2017

 

                    COMPLETE CBC W/AUTO DIFF WBC CPT-4: 19344        2017

 

                    LIPID PANEL         CPT-4: 52772        2017

 

                    A1C HPLC            CPT-4: 82533        2017

 

                    ASSAY THYROID STIM HORMONE CPT-4: 75208        2017

 

                    ROUTINE VENIPUNCTURE CPT-4: 79646        2017

 

                    ASSAY THYROID STIM HORMONE CPT-4: 00499        2017

 

                    COMPREHEN METABOLIC PANEL CPT-4: 12142        2017

 

                    COMPLETE CBC W/AUTO DIFF WBC CPT-4: 54127        2017

 

                    A1C HPLC            CPT-4: 28309        2017

 

                    FLU VACC PRSV FREE INC ANTIG 65 AND OLDER CPT-4: 37498      

  10/07/2016

 

                    ADMIN INFLUENZA VIRUS VAC CPT-4:         10/07/2016

 

                    ROUTINE VENIPUNCTURE CPT-4: 99719        2016

 

                    ASSAY OF FREE THYROXINE CPT-4: 24191        2016

 

                    ASSAY THYROID STIM HORMONE CPT-4: 32737        2016

 

                    COMPREHEN METABOLIC PANEL CPT-4: 38747        2016

 

                    COMPLETE CBC W/AUTO DIFF WBC CPT-4: 45559        2016

 

                    LIPID PANEL         CPT-4: 87948        2016

 

                    A1C HPLC            CPT-4: 18114        2016

 

                    URINALYSIS NONAUTO W/O SCOPE CPT-4: 32396        2016

 

                    ROUTINE VENIPUNCTURE CPT-4: 63410        2015

 

                    METABOLIC PANEL TOTAL CA CPT-4: 90348        2015

 

                    PRESCRIP TRANSMIT VIA ERX SY CPT-4:         2015

 

                    FLU VACC PRSV FREE INC ANTIG 65 AND OLDER CPT-4: 91190      

  10/16/2015

 

                    ADMIN INFLUENZA VIRUS VAC CPT-4:         10/16/2015

 

                    URINALYSIS NONAUTO W/O SCOPE CPT-4: 11025        09/15/2015

 

                    URINE CULTURE/ COLONY COUNT CPT-4: 51123        09/15/2015

 

                    ROUTINE VENIPUNCTURE CPT-4: 59030        09/10/2015

 

                    ASSAY OF FREE THYROXINE CPT-4: 41243        09/10/2015

 

                    ASSAY THYROID STIM HORMONE CPT-4: 40507        09/10/2015

 

                    COMPREHEN METABOLIC PANEL CPT-4: 85762        09/10/2015

 

                    COMPLETE CBC W/AUTO DIFF WBC CPT-4: 38477        09/10/2015

 

                    LIPID PANEL         CPT-4: 83575        09/10/2015

 

                    A1C HPLC            CPT-4: 60250        09/10/2015

 

                    CERUM REMOVAL       CPT-4: 02077        2015

 

                    PRESCRIP TRANSMIT VIA ERX SY CPT-4:         2015

 

                    FLUZONE, 5ML (Medicare) CPT-4:         10/17/2014

 

                    ADMIN INFLUENZA VIRUS VAC CPT-4:         10/17/2014

 

                    PRESCRIP TRANSMIT VIA ERX SY CPT-4:         2014

 

                    PRESCRIP TRANSMIT VIA ERX SY CPT-4:         2014

 

                    PRESCRIP TRANSMIT VIA ERX SY CPT-4:         2014

 

                    ROUTINE VENIPUNCTURE CPT-4: 35934        2014

 

                    ASSAY OF FREE THYROXINE CPT-4: 79342        2014

 

                    ASSAY THYROID STIM HORMONE CPT-4: 32127        2014

 

                    COMPREHEN METABOLIC PANEL CPT-4: 21249        2014

 

                    COMPLETE CBC W/AUTO DIFF WBC CPT-4: 46717        2014

 

                    LIPID PANEL         CPT-4: 72912        2014

 

                    A1C HPLC            CPT-4: 25882        2014

 

                    VITAMIN B 12 FOLIC ACID CPT-4: 96842|06341  2014

 

                    PRESCRIP TRANSMIT VIA ERX SY CPT-4:         2014

 

                    PRESCRIP TRANSMIT VIA ERX SY CPT-4:         10/15/2013

 

                    FLUZONE, 5ML (Medicare) CPT-4:         2013

 

                    ADMIN INFLUENZA VIRUS VAC CPT-4:         2013

 

                    PRESCRIP TRANSMIT VIA ERX SY CPT-4:         2013

 

                    PRESCRIP TRANSMIT VIA ERX SY CPT-4:         05/10/2013

 

                    ROUTINE VENIPUNCTURE CPT-4: 56113        2012

 

                    ASSAY OF FREE THYROXINE CPT-4: 29559        2012

 

                    ASSAY THYROID STIM HORMONE CPT-4: 05073        2012

 

                    COMPREHEN METABOLIC PANEL CPT-4: 10429        2012

 

                    COMPLETE CBC W/AUTO DIFF WBC CPT-4: 26608        2012

 

                    LIPID PANEL         CPT-4: 86163        2012

 

                    A1C GLYCOSYLATED HEMOGLOBIN TEST CPT-4: 36227        

012

 

                    CERUM REMOVAL       CPT-4: 87982        2012

 

                    PRESCRIP TRANSMIT VIA ERX SY CPT-4:         2012

 

                    PRESCRIP TRANSMIT VIA ERX SY CPT-4:         10/10/2012

 

                    FLUZONE, 5ML (Medicare) CPT-4:         2012

 

                    ADMIN INFLUENZA VIRUS VAC CPT-4:         2012

 

                    ASSAY, GLUCOSE, BLOOD QUANT CPT-4: 61373        2012

 

                    URINALYSIS NONAUTO W/O SCOPE CPT-4: 33309        2012

 

                    URINE CULTURE/ COLONY COUNT CPT-4: 01682        2012

 

                    ROUTINE VENIPUNCTURE CPT-4: 32317        2012

 

                    ASSAY OF FREE THYROXINE CPT-4: 69523        2012

 

                    ASSAY THYROID STIM HORMONE CPT-4: 72685        2012

 

                    COMPREHEN METABOLIC PANEL CPT-4: 77792        2012

 

                    COMPLETE CBC W/AUTO DIFF WBC CPT-4: 34756        2012

 

                    LIPID PANEL         CPT-4: 58960        2012

 

                    ASSAY OF INSULIN    CPT-4: 61385        2012

 

                    A1C GLYCOSYLATED HEMOGLOBIN TEST CPT-4: 76762        

012

 

                    DRAIN/INJECT JOINT/BURSA CPT-4: 98694        2012

 

                    METHYLPREDNISOLONE 40 MG INJ CPT-4:         2012

 

                    TRIAMCINOLONE ACET INJ NOS CPT-4:         2012

 

                    PRESCRIP TRANSMIT VIA ERX SY CPT-4:         2012

 

                    PRESCRIP TRANSMIT VIA ERX SY CPT-4:         2012

 

                    METHYLPREDNISOLONE 40 MG INJ CPT-4:         2012

 

                    DRAIN/INJECT JOINT/BURSA CPT-4: 87406        2012

 

                    TRIAMCINOLONE ACET INJ NOS CPT-4:         2012

 

                    PRESCRIP TRANSMIT VIA ERX SY CPT-4:         2012

 

                    ROUTINE VENIPUNCTURE CPT-4: 34176        2012

 

                    ASSAY OF FREE THYROXINE CPT-4: 75429        2012

 

                    ASSAY THYROID STIM HORMONE CPT-4: 07683        2012

 

                    COMPREHEN METABOLIC PANEL CPT-4: 13105        2012

 

                    COMPLETE CBC W/AUTO DIFF WBC CPT-4: 92235        2012

 

                    LIPID PANEL         CPT-4: 45086        2012

 

                    PRESCRIP TRANSMIT VIA ERX SY CPT-4:         2012

 

                    CERUM REMOVAL       CPT-4: 85889        2011

 

                    PRESCRIP TRANSMIT VIA ERX SY CPT-4:         2011

 

                    FLUZONE, 5ML (Medicare) CPT-4:         10/05/2011

 

                    ADMIN INFLUENZA VIRUS VAC CPT-4:         10/05/2011

 

                    PRESCRIP TRANSMIT VIA ERX SY CPT-4:         2011

 

                    URINALYSIS NONAUTO W/O SCOPE CPT-4: 59195        2011

 

                    URINE CULTURE/ COLONY COUNT CPT-4: 29815        2011

 

                    CUR TOBACCO NON-USER CPT-4:         2011

 

                    ROUTINE VENIPUNCTURE CPT-4: 45185        2011

 

                    COMPLETE CBC W/AUTO DIFF WBC CPT-4: 07384        2011

 

                    COMPREHEN METABOLIC PANEL CPT-4: 12312        2011

 

                    LIPID PANEL         CPT-4: 06227        2011

 

                    ASSAY THYROID STIM HORMONE CPT-4: 60614        2011

 

                    ASSAY OF FREE THYROXINE CPT-4: 70770        2011

 

                    PRESCRIP TRANSMIT VIA ERX SY CPT-4:         2011

 

                    INJ TRIGGER POINT 1/2 MUSCL CPT-4: 41231        2011

 

                    TRIAMCINOLONE ACET INJ NOS CPT-4:         2011

 

                    METHYLPREDNISOLONE 40 MG INJ CPT-4:         2011

 

                    THER/PROPH/DIAG INJ SC/IM CPT-4: 23850        2011

 

                    KETOROLAC TROMETHAMINE INJ CPT-4:         2011

 

                    PRESCRIP TRANSMIT VIA ERX SY CPT-4:         2010

 

                    FLU VACCINE 3 YRS & > IM UP 64 CPT-4: 71272        10/06/201

0

 

                    ADMIN INFLUENZA VIRUS VAC CPT-4:         10/06/2010

 

                    URINALYSIS NONAUTO W/O SCOPE CPT-4: 32874        2010

 

                    URINE CULTURE/ COLONY COUNT CPT-4: 14262        2010

 

                    PRESCRIP TRANSMIT VIA ERX SY CPT-4:         2010

 

                    THER/PROPH/DIAG INJ SC/IM CPT-4: 83827        2010

 

                    VITAMIN B12 INJECTION CPT-4:         2010

 

                    THER/PROPH/DIAG INJ SC/IM CPT-4: 70604        2010

 

                    VITAMIN B12 INJECTION CPT-4:         2010

 

                    ROUTINE VENIPUNCTURE CPT-4: 61918        2010







Vital Signs





                          Date                      Vital

 

                2020      Blood Pressure 1: 148/66 Code: 8480-6 Heart Rate

 1: 56 bpm 

Respiratory Rate: 20 bpm SpO2: 99%           Temperature: 36.6 (C) / 97.8 (F) We

ight: 211 

lbs 

 

                2020      Blood Pressure 1: 138/64 Code: 8480-6 Heart Rate

 1: 52 bpm 

Respiratory Rate: 18 bpm  SpO2: 98%                 Temperature: 36.3 (C) / 97.4

 (F)

 

                    2020          Blood Pressure 1: 144/82 Code: 8480-6 He

art Rate 1: 56 bpm

 

                2020      Blood Pressure 1: 154/86 Code: 8480-6 Heart Rate

 1: 64 bpm 

Respiratory Rate: 20 bpm SpO2: 99%           Temperature: 37.1 (C) / 98.7 (F) We

ight: 215 

lbs 

 

             2019   Blood Pressure 1: 140/70 Code: 8480-6 Heart Rate 1: 57

 bpm SpO2: 99%    

Temperature: 35.9 (C) / 96.6 (F)        Weight: 215 lbs 

 

                06/10/2019      Blood Pressure 1: 144/70 Code: 8480-6 Heart Rate

 1: 60 bpm 

Respiratory Rate: 20 bpm SpO2: 95%           Temperature: 36.4 (C) / 97.6 (F) We

ight: 214 

lbs 

 

                2019      Blood Pressure 1: 128/78 Code: 8480-6 Heart Rate

 1: 56 bpm 

Respiratory Rate: 20 bpm SpO2: 98%           Temperature: 36.3 (C) / 97.4 (F) We

ight: 213 

lbs 

 

                2018      Blood Pressure 1: 142/60 Code: 8480-6 BMI: 38.2 

Code: 65094-0 Heart 

Rate 1: 48 bpm  Height: 5'2"    Respiratory Rate: 20 bpm SpO2: 98%       Tempera

ture: 36.7

(C) / 98.1 (F)                          Weight: 212 lbs 

 

                2018      Blood Pressure 1: 142/64 Code: 8480-6 BMI: 38.5 

Code: 19164-0 Heart 

Rate 1: 52 bpm  Height: 5'2"    Respiratory Rate: 22 bpm SpO2: 96%       Tempera

ture: 36.1

(C) / 96.9 (F)                          Weight: 214 lbs 

 

                10/02/2018      Blood Pressure 1: 124/80 Code: 8480-6 BMI: 38.3 

Code: 78800-7 Heart 

Rate 1: 68 bpm      Height: 5'2"        Respiratory Rate: 20 bpm Temperature: 36

.3 (C) / 

97.4 (F)                                Weight: 213 lbs 

 

                2018      Blood Pressure 1: 132/78 Code: 8480-6 BMI: 37.6 

Code: 79366-1 Heart 

Rate 1: 68 bpm  Height: 5'2"    Respiratory Rate: 20 bpm SpO2: 97%       Tempera

ture: 36.8

(C) / 98.2 (F)                          Weight: 209 lbs 

 

                2018      Blood Pressure 1: 150/76 Code: 8480-6 BMI: 38.5 

Code: 14467-7 Heart 

Rate 1: 64 bpm  Height: 5'2"    Respiratory Rate: 20 bpm SpO2: 97%       Tempera

ture: 36.2

(C) / 97.2 (F)                          Weight: 214 lbs 

 

                2018      Blood Pressure 1: 122/74 Code: 8480-6 BMI: 38.2 

Code: 48844-9 Heart 

Rate 1: 64 bpm  Height: 5'2"    Respiratory Rate: 18 bpm SpO2: 96%       Tempera

ture: 35.8

(C) / 96.4 (F)                          Weight: 212 lbs 

 

                2018      Blood Pressure 1: 124/78 Code: 8480-6 BMI: 37.8 

Code: 72551-9 Heart 

Rate 1: 76 bpm      Height: 5'2"        Respiratory Rate: 20 bpm Temperature: 36

.8 (C) / 

98.3 (F)                                Weight: 210 lbs 

 

                2018      Blood Pressure 1: 136/70 Code: 8480-6 BMI: 38.0 

Code: 99968-0 Heart 

Rate 1: 68 bpm  Height: 5'2"    Respiratory Rate: 20 bpm SpO2: 97%       Tempera

ture: 36.8

(C) / 98.2 (F)                          Weight: 211 lbs 

 

                2018      Blood Pressure 1: 140/65 Code: 8480-6 Heart Rate

 1: 75 bpm 

Respiratory Rate: 24 bpm SpO2: 95%           Temperature: 37.0 (C) / 98.6 (F) We

ight: 211 

lbs 

 

                2018      Blood Pressure 1: 154/70 Code: 8480-6 BMI: 37.6 

Code: 92372-4 Heart 

Rate 1: 76 bpm  Height: 5'2"    Respiratory Rate: 20 bpm SpO2: 98%       Tempera

ture: 36.9

(C) / 98.5 (F)                          Weight: 209 lbs 

 

                2017      Blood Pressure 1: 156/70 Code: 8480-6 BMI: 37.1 

Code: 10939-6 Heart 

Rate 1: 72 bpm  Height: 5'2"    Respiratory Rate: 20 bpm SpO2: 97%       Tempera

ture: 37.0

(C) / 98.6 (F)                          Weight: 206 lbs 

 

                2017      Blood Pressure 1: 152/78 Code: 8480-6 BMI: 36.8 

Code: 65732-2 Heart 

Rate 1: 78 bpm  Height: 5'2"    Respiratory Rate: 20 bpm SpO2: 98%       Tempera

ture: 36.1

(C) / 97.0 (F)                          Weight: 204 lbs 

 

                2017      Blood Pressure 1: 142/70 Code: 8480-6 BMI: 36.9 

Code: 48592-7 Heart 

Rate 1: 64 bpm  Height: 5'2"    Respiratory Rate: 20 bpm SpO2: 96%       Tempera

ture: 36.5

(C) / 97.7 (F)                          Weight: 205 lbs 

 

                2017      Blood Pressure 1: 142/68 Code: 8480-6 Heart Rate

 1: 88 bpm 

Respiratory Rate: 18 bpm SpO2: 98%           Temperature: 36.3 (C) / 97.3 (F) We

ight: 209 

lbs 

 

                2016      Blood Pressure 1: 130/78 Code: 8480-6 Heart Rate

 1: 68 bpm 

Respiratory Rate: 20 bpm SpO2: 95%           Temperature: 36.4 (C) / 97.6 (F) We

ight: 212 

lbs 

 

                2016      Blood Pressure 1: 122/64 Code: 8480-6 BMI: 39.1 

Code: 97731-5 Heart 

Rate 1: 76 bpm      Height: 5'2"        Respiratory Rate: 20 bpm Temperature: 36

.8 (C) / 

98.2 (F)                                Weight: 217 lbs 

 

                2016      Blood Pressure 1: 144/70 Code: 8480-6 BMI: 39.4 

Code: 29361-9 Heart 

Rate 1: 76 bpm      Height: 5'2"        Respiratory Rate: 20 bpm Temperature: 36

.6 (C) / 

97.9 (F)                                Weight: 219 lbs 

 

                2015      Blood Pressure 1: 152/60 Code: 8480-6 BMI: 39.6 

Code: 77717-5 Heart 

Rate 1: 84 bpm      Height: 5'2"        Respiratory Rate: 20 bpm Temperature: 37

.0 (C) / 

98.6 (F)                                Weight: 220 lbs 

 

                2015      Blood Pressure 1: 146/76 Code: 8480-6 BMI: 39.8 

Code: 06142-2 Heart 

Rate 1: 88 bpm      Height: 5'2"        Respiratory Rate: 20 bpm Temperature: 37

.0 (C) / 

98.6 (F)                                Weight: 221 lbs 

 

                2015      Blood Pressure 1: 132/70 Code: 8480-6 BMI: 39.1 

Code: 77236-5 Heart 

Rate 1: 88 bpm      Height: 5'2"        Respiratory Rate: 20 bpm Temperature: 36

.4 (C) / 

97.6 (F)                                Weight: 217 lbs 

 

                2015      Blood Pressure 1: 132/66 Code: 8480-6 BMI: 39.9 

Code: 32325-6 Heart 

Rate 1: 72 bpm      Height: 5'2"        Respiratory Rate: 20 bpm Temperature: 36

.9 (C) / 

98.4 (F)                                Weight: 218 lbs 

 

                2015      Blood Pressure 1: 136/80 Code: 8480-6 Heart Rate

 1: 76 bpm 

Respiratory Rate: 20 bpm  Temperature: 36.7 (C) / 98.0 (F) Weight: 224 lbs 

 

                2015      Blood Pressure 1: 134/78 Code: 8480-6 BMI: 39.7 

Code: 28975-3 Heart 

Rate 1: 84 bpm      Height: 5'2"        Respiratory Rate: 20 bpm Temperature: 36

.7 (C) / 

98.0 (F)                                Weight: 217 lbs 

 

                2014      Blood Pressure 1: 146/78 Code: 8480-6 BMI: 39.5 

Code: 28025-9 Heart 

Rate 1: 82 bpm      Height: 5'2"        Respiratory Rate: 18 bpm Temperature: 35

.6 (C) / 

96.1 (F)                                Weight: 216 lbs 

 

                2014      Blood Pressure 1: 134/70 Code: 8480-6 BMI: 37.9 

Code: 76382-1 Heart 

Rate 1: 80 bpm      Height: 5'3"        Respiratory Rate: 20 bpm Temperature: 36

.8 (C) / 

98.2 (F)                                Weight: 214 lbs 

 

                2014      Blood Pressure 1: 132/70 Code: 8480-6 BMI: 37.6 

Code: 16091-2 Heart 

Rate 1: 80 bpm      Height: 5'3"        Respiratory Rate: 20 bpm Temperature: 36

.8 (C) / 

98.3 (F)                                Weight: 212 lbs 

 

                2014      Blood Pressure 1: 116/74 Code: 8480-6 Heart Rate

 1: 68 bpm 

Respiratory Rate: 20 bpm  Temperature: 36.2 (C) / 97.1 (F) Weight: 212 lbs 

 

                10/15/2013      Blood Pressure 1: 132/82 Code: 8480-6 BMI: 37.4 

Code: 13715-8 Heart 

Rate 1: 76 bpm      Height: 5'3"        Respiratory Rate: 20 bpm Temperature: 36

.7 (C) / 

98.0 (F)                                Weight: 211 lbs 

 

                    2013          Blood Pressure 1: 130/76 Code: 8480-6 He

art Rate 1: 78 bpm

 

                2013      Blood Pressure 1: 140/82 Code: 8480-6 BMI: 36.8 

Code: 56023-5 Heart 

Rate 1: 66 bpm      Height: 5'3"        Respiratory Rate: 20 bpm Temperature: 36

.1 (C) / 

96.9 (F)                                Weight: 208 lbs 

 

                2013      Blood Pressure 1: 138/80 Code: 8480-6 BMI: 36.4 

Code: 90064-8 Heart 

Rate 1: 72 bpm      Height: 5'4"        Respiratory Rate: 20 bpm Temperature: 36

.7 (C) / 

98.0 (F)                                Weight: 212 lbs 

 

                2013      Blood Pressure 1: 124/70 Code: 8480-6 BMI: 36.9 

Code: 86170-4 Heart 

Rate 1: 60 bpm      Height: 5'4"        Temperature: 36.1 (C) / 97.0 (F) Weight:

 215 lbs 

 

                05/10/2013      Blood Pressure 1: 132/86 Code: 8480-6 BMI: 36.9 

Code: 54219-7 Heart 

Rate 1: 76 bpm      Height: 5'4"        Respiratory Rate: 20 bpm Temperature: 36

.8 (C) / 

98.2 (F)                                Weight: 215 lbs 

 

                2013      Blood Pressure 1: 134/82 Code: 8480-6 BMI: 36.6 

Code: 34635-1 Heart 

Rate 1: 72 bpm      Height: 5'4"        Respiratory Rate: 20 bpm Temperature: 36

.3 (C) / 

97.4 (F)                                Weight: 213 lbs 

 

                2012      Blood Pressure 1: 142/80 Code: 8480-6 BMI: 37.1 

Code: 09435-4 Heart 

Rate 1: 76 bpm      Height: 5'4"        Respiratory Rate: 20 bpm Temperature: 36

.8 (C) / 

98.3 (F)                                Weight: 216 lbs 

 

                10/23/2012      Blood Pressure 1: 128/68 Code: 8480-6 BMI: 37.6 

Code: 93610-8 Heart 

Rate 1: 72 bpm      Height: 5'4"        Respiratory Rate: 20 bpm Temperature: 36

.6 (C) / 

97.9 (F)                                Weight: 219 lbs 

 

                10/10/2012      Blood Pressure 1: 122/70 Code: 8480-6 Heart Rate

 1: 76 bpm 

Respiratory Rate: 20 bpm  Temperature: 36.7 (C) / 98.1 (F) Weight: 218 lbs 

 

                    2012          Blood Pressure 1: 132/80 Code: 8480-6 He

art Rate 1: 84 bpm

 

                2012      Blood Pressure 1: 128/78 Code: 8480-6 BMI: 38.1 

Code: 37611-7 Heart 

Rate 1: 84 bpm      Height: 5'4"        Respiratory Rate: 20 bpm Temperature: 36

.9 (C) / 

98.4 (F)                                Weight: 222 lbs 

 

                2012      Blood Pressure 1: 138/80 Code: 8480-6 BMI: 37.9 

Code: 10059-0 Heart 

Rate 1: 74 bpm      Height: 5'4"        Temperature: 36.1 (C) / 97.0 (F) Weight:

 221 lbs 

 

                2012      Blood Pressure 1: 126/78 Code: 8480-6 BMI: 38.4 

Code: 79431-3 Heart 

Rate 1: 72 bpm      Height: 5'4"        Respiratory Rate: 20 bpm Temperature: 36

.7 (C) / 

98.0 (F)                                Weight: 224 lbs 

 

                2012      Blood Pressure 1: 138/72 Code: 8480-6 BMI: 38.4 

Code: 83035-9 Heart 

Rate 1: 72 bpm      Height: 5'4"        Respiratory Rate: 20 bpm Temperature: 36

.6 (C) / 

97.9 (F)                                Weight: 224 lbs 

 

                2012      Blood Pressure 1: 122/78 Code: 8480-6 BMI: 38.8 

Code: 34524-5 Heart 

Rate 1: 88 bpm      Height: 5'4"        Respiratory Rate: 20 bpm Temperature: 36

.6 (C) / 

97.8 (F)                                Weight: 226 lbs 

 

                2012      Blood Pressure 1: 116/60 Code: 8480-6 BMI: 38.1 

Code: 78197-5 Heart 

Rate 1: 92 bpm      Height: 5'4"        Respiratory Rate: 20 bpm Temperature: 36

.8 (C) / 

98.2 (F)                                Weight: 222 lbs 

 

                2011      Blood Pressure 1: 118/62 Code: 8480-6 BMI: 37.9 

Code: 01075-7 Heart 

Rate 1: 80 bpm      Height: 5'4"        Temperature: 36.5 (C) / 97.7 (F) Weight:

 221 lbs 

 

                2011      Blood Pressure 1: 132/70 Code: 8480-6 Heart Rate

 1: 84 bpm 

Respiratory Rate: 20 bpm  Temperature: 36.7 (C) / 98.0 (F) Weight: 221 lbs 

 

                2011      Blood Pressure 1: 114/72 Code: 8480-6 BMI: 37.6 

Code: 41947-6 Heart 

Rate 1: 76 bpm      Height: 5'4"        Respiratory Rate: 20 bpm Temperature: 36

.8 (C) / 

98.3 (F)                                Weight: 219 lbs 

 

                    2011          Blood Pressure 1: 136/76 Code: 8480-6 Te

mperature: 36.0 (C) / 96.8 

(F)                                     Weight: 219 lbs 8 oz

 

                2011      Blood Pressure 1: 128/80 Code: 8480-6 Heart Rate

 1: 72 bpm 

Temperature: 36.3 (C) / 97.4 (F)        Weight: 218 lbs 

 

                2011      Blood Pressure 1: 126/70 Code: 8480-6 Heart Rate

 1: 72 bpm 

Temperature: 36.7 (C) / 98.0 (F)

 

                    2010          Blood Pressure 1: 126/78 Code: 8480-6 Te

mperature: 36.2 (C) / 97.1 

(F)                                     Weight: 217 lbs 8 oz

 

                2010      Blood Pressure 1: 116/70 Code: 8480-6 Heart Rate

 1: 72 bpm 

Temperature: 36.4 (C) / 97.6 (F)        Weight: 222 lbs 

 

                2010      Blood Pressure 1: 114/78 Code: 8480-6 Heart Rate

 1: 72 bpm 

Temperature: 37.1 (C) / 98.8 (F)        Weight: 225 lbs 

 

                2010      Blood Pressure 1: 128/78 Code: 8480-6 Heart Rate

 1: 76 bpm 

Temperature: 36.6 (C) / 97.8 (F)        Weight: 224 lbs 

 

                2010      Blood Pressure 1: 116/78 Code: 8480-6 Heart Rate

 1: 80 bpm 

Temperature: 36.4 (C) / 97.6 (F)        Weight: 226 lbs 

 

                2010      Blood Pressure 1: 120/70 Code: 8480-6 BMI: 38.3 

Code: 46528-4 Heart 

Rate 1: 88 bpm      Height: 5'5"        Temperature: 36.4 (C) / 97.6 (F) Weight:

 230 lbs 







Functional Status

No Functional Status data



Reason For Visit





                    Reason For Visit    Effective Dates     Notes

 

                    follow up           2020           

 

                    follow up           2020           

 

                    blood pressure check 2020           

 

                    high blood pressure 2020           

 

                    lab draw            2019           

 

                    lab draw            10/11/2019           

 

                    hearing loss        2019          left ear

 

                    follow up           06/10/2019           

 

                    follow up           2019          Patient has upcoming

 appointment with Dr Claire and carotid 

dopplers tomorrow

 

                    follow up           2018          Patient had heart ca

th on 10-30-18 and one of the bypass 

veins in spasming

 

                    follow up           2018          4 Week

 

                    follow up           10/02/2018           

 

                    follow up           2018           

 

                    high blood pressure 2018          Dr Claire has ordered

 holter monitor and 

hydralazine 50mg PRN

 

                    dizziness           2018          On  morning pa

sammint woke up and went to the bathroom 

and after lying down became very dizzy. Patient has hx of vertigo so didn't 
think anything of it. She usually just has them intermittently, but had more 
that day. While [facility] began to feel very ill and became very shaky. She got 
home and sat in the recliner and had the jittery/nervous feeling. Later that 
night it finally resolved. Patient feels like she had a spell like this around 
the  and thought it was due to extreme heat exhaustion.

 

                    follow up           2018           

 

                    back pain           2018           

 

                    otalgia             2018           

 

                    back pain           2018           

 

                    injection(s)        10/06/2017          flu shot

 

                    lab draw            2017           

 

                    follow up           2017           

 

                    pelvic pain         2017          Patient states pain 

started in May with a "Constant Ache"

to left inguinal area. Patient states at that time pain was intermittent. Then, 
approximately 2nd week  of  pain worsened and radiates to left low back 
area.

 

                    lab draw            2017           

 

                    hyperglycemia       2017           

 

                    cough               2017           

 

                    otalgia             2016           

 

                    injection(s)        10/07/2016          Influenza

 

                    lab draw            2016           

 

                    constipation        2016           

 

                    flank pain          2016           

 

                    follow up           2015          3mo fwup

 

                    injection(s)        10/16/2015          Influenza

 

                    acute renal failure 09/15/2015           

 

                    lab draw            09/10/2015           

 

                    fatigue             2015           

 

                    otalgia             2015           

 

                    pain, limb          2015           

 

                    follow up           2015          Hospital fwup

 

                    high blood pressure 2015           

 

                    injection(s)        10/17/2014          flu shot

 

                    sinusitis           2014           

 

                    high blood pressure 2014           

 

                    cough               2014          Was panfilo at Dr Tyree teixeira's office so he started he on amlodipine 

10mg but seems to be dragging her down. Patient decreased to 1/2 tablet this 
last week

 

                    lab draw            2014           

 

                    cough               2014           

 

                    cough               10/15/2013           

 

                    high blood pressure 2013           

 

                    follow up           2013          ER

 

                    dizziness           2013           

 

                    follow up           2013           

 

                    otalgia             05/10/2013           

 

                    breast complaint    2013           

 

                    lab draw            2012           

 

                    follow up           2012          2mo fwup

 

                    follow up           10/23/2012          2wk fwup

 

                    gas and bloating    10/10/2012          Dr Claire has her mon

itoring because was high in his 

office at last visit

 

                    injection(s)        2012           

 

                    dizziness           2012           

 

                    dizziness           2012           

 

                    lab draw            2012           

 

                    muscle weakness     2012          concerns of simvasta

tin with fatigue and muscle 

weakness

 

                    leg pain/sciatica   2012           

 

                    hip pain            2012           

 

                    back pain           2012          has tried ice pack, 

muscle relaxers, and moist heat

 

                    back pain           2012           

 

                    fatigue             2012          in hands/feet

 

                    otalgia             2011           

 

                    follow up           2011          2wk fwup

 

                    back pain           2011          thinks ulcer may hav

e returned

 

                    lab draw            2011           

 

                    dizziness           2011          Left ear and facial 

pain, right ear pain 

 

                    follow up           2011          1wk fwup

 

                    hip pain            2011          feels very draggy, f

atigue, BP 80/63, normally running 

100's/60's

 

                    chills              2010           

 

                    injection(s)        10/06/2010          flu shot

 

                    follow up           2010          6wk fwup, had HH rep

aired and is starting to feel better, 

started on reglan 10mg tid

 

                    follow up           2010          hosp fwup

 

                    follow up           2010          1mo fwup, saw Dr Danna du and hasn't gave cardiac clearance 

yet for HH repair, did have chemical stress test yesterday and waiting on 
results

 

                    follow up           2010          from EGD/colonoscopy

--has Hiatal Hernia and wants to do 

repair

 

                    follow up           2010           







Encounters





             Encounter    Performer    Location     Codes        Date

 

                                        ([id number]) OFFICE/OUTPATIENT VISIT EST

Diagnosis: Essential (primary) hypertension[ICD10: I10]

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

 

                                        ([id number]) OFFICE/OUTPATIENT VISIT EST

Diagnosis: Essential (primary) hypertension[ICD10: I10]

Diagnosis: Palpitations[ICD10: R00.2] Akanksha GUNN 

[employer]                    CPT-4: 52115              2020

 

                                        (45329) OFFICE/OUTPATIENT VISIT EST

Diagnosis: Essential hypertension[ICD10: I10]

Diagnosis: Palpitations[ICD10: R00.2]

Diagnosis: Stress reaction[ICD10: F43.0] [employer]anksha [employer]            CPT-4: 51868              2020

 

                                        (53663) NURSE/OUTPATIENT VISIT EST

Diagnosis: Mixed hyperlipidemia[ICD10: E78.2] Akanksha DRIVER DO [facility]            CPT-4: 86826              2019

 

                                        (99537) NURSE/OUTPATIENT VISIT EST

Diagnosis: FLU VACCINE[ICD10: Z23] Akanksha KEY DO 

[facility]                       CPT-4: 22030              10/22/2019

 

                                        (45975) NURSE/OUTPATIENT VISIT EST

Diagnosis: Chronic kidney disease, stage 1[ICD10: N18.1] Akanksha DRIVER DO [facility] CPT-4: 09191              10/11/2019

 

                                        ([id number]) OFFICE/OUTPATIENT VISIT EST

Diagnosis: Acute serous otitis media, left ear[ICD10: H65.02] Nat DRIVER DO [facility] CPT-4: 78228              2019

 

                                        (07425) OFFICE/OUTPATIENT VISIT EST

Diagnosis: Essential (primary) hypertension[ICD10: I10]

Diagnosis: Coronary atherosclerosis due to calcified coronary lesion[ICD10: 
I25.84]

Diagnosis: Hypoglycemia, unspecified[ICD10: E16.2]

Diagnosis: Other fatigue[ICD10: R53.83]

Diagnosis: Urinary tract infection, site not specified[ICD10: N39.0] Akanksha DRIVER DO [facility] CPT-4: 58867        06/10/2019

 

                                        (65102) OFFICE/OUTPATIENT VISIT EST

Diagnosis: Essential (primary) hypertension[ICD10: I10]

Diagnosis: Gastro-esophageal reflux disease without esophagitis[ICD10: K21.9]

Diagnosis: Urinary tract infection, site not specified[ICD10: N39.0] Akanksha DRIVER DO [facility] CPT-4: 97068        2019

 

                                        (18014) OFFICE/OUTPATIENT VISIT EST

Diagnosis: Gastro-esophageal reflux disease without esophagitis[ICD10: K21.9]

Diagnosis: Epigastric pain[ICD10: R10.13] Akanksha DRIVER [employer]            CPT-4: 62456              2018

 

                                        (39585) OFFICE/OUTPATIENT VISIT EST

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

Diagnosis: Essential (primary) hypertension[ICD10: I10]

Diagnosis: Generalized anxiety disorder[ICD10: F41.1]

Diagnosis: Gastro-esophageal reflux disease without esophagitis[ICD10: K21.9] 

Akanksha DRIVER [employer] [facility] CPT-4: 30081        2018

 

                                        OFFICE/OUTPATIENT VISIT EST

Diagnosis: Gastro-esophageal reflux disease without esophagitis[ICD10: K21.9]

Diagnosis: Palpitations[ICD10: R00.2]

Diagnosis: Urinary tract infection, site not specified[ICD10: N39.0]

Diagnosis: Generalized anxiety disorder[ICD10: F41.1] Akanksha SPENCER [employer] [facility] CPT-4: 86165              10/02/2018

 

                                        (96211) NURSE/OUTPATIENT VISIT EST

Diagnosis: Coronary atherosclerosis due to calcified coronary lesion[ICD10: 
I25.84]

Diagnosis: Hypoglycemia, unspecified[ICD10: E16.2]

Diagnosis: Dizziness and giddiness[ICD10: R42]

Diagnosis: Occlusion and stenosis of bilateral carotid arteries[ICD10: I65.23] 

Akanksha DRIVER [employer] [facility] CPT-4: 22441        2018

 

                                        OFFICE/OUTPATIENT VISIT EST

Diagnosis: Epigastric pain[ICD10: R10.13]

Diagnosis: Generalized anxiety disorder[ICD10: F41.1]

Diagnosis: FLU VACCINE[ICD10: Z23] Akanksha SPENCER

 [employer] 

[facility]                       CPT-4: 59972              2018

 

                                        (31913) OFFICE/OUTPATIENT VISIT EST

Diagnosis: Essential (primary) hypertension[ICD10: I10]

Diagnosis: Hypoglycemia, unspecified[ICD10: E16.2]

Diagnosis: Gastro-esophageal reflux disease without esophagitis[ICD10: K21.9] 

Akanksha DRIVER [facility] CPT-4: 34845        2018

 

                                        (13431) OFFICE/OUTPATIENT VISIT EST

Diagnosis: Dizziness and giddiness[ICD10: R42] Nat DRIVER [facility]            CPT-4: 74322              2018

 

                                        (92792) OFFICE/OUTPATIENT VISIT EST

Diagnosis: Cervicalgia[ICD10: M54.2]

Diagnosis: Other spondylosis with radiculopathy, cervical region[ICD10: M47.22] 

Akanksha DRIVER [facility] CPT-4: 61414        2018

 

                                        (53087) OFFICE/OUTPATIENT VISIT EST

Diagnosis: Hypoglycemia, unspecified[ICD10: E16.2]

Diagnosis: Other spondylosis with radiculopathy, cervical region[ICD10: M47.22]

Diagnosis: Vertigo of central origin, bilateral[ICD10: H81.43]

Diagnosis: Cervicocranial syndrome[ICD10: M53.0] [facility]         CPT[facility]: 02201              2018

 

                                        (19748) OFFICE/OUTPATIENT VISIT EST

Diagnosis: Benign paroxysmal vertigo, bilateral[ICD10: H81.13]

Diagnosis: Otalgia, bilateral[ICD10: H92.03] Nat DRIVER [facility]            CPT-4: 64787              2018

 

                                        (59876) OFFICE/OUTPATIENT VISIT EST

Diagnosis: Low back pain[ICD10: M54.5]

Diagnosis: Radiculopathy, lumbosacral region[ICD10: M54.17]

Diagnosis: Left lower quadrant pain[ICD10: R10.32] Akanksha Xiangndangela IZQUIERDOTAMMY

OLIVIA DRIVER [employer] [facility]         CPT-4: 43825              2018

 

                                        (02472) OFFICE/OUTPATIENT VISIT EST

Diagnosis: FLU VACCINE[ICD10: Z23] Akanksha Otoolendangela SPENCER

[facility]                       CPT-4: 15125              10/06/2017

 

                                        (06688) OFFICE/OUTPATIENT VISIT EST

Diagnosis: Mixed hyperlipidemia[ICD10: E78.2]

Diagnosis: Essential (primary) hypertension[ICD10: I10]

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

Diagnosis: Impaired fasting glucose[ICD10: R73.01]

Diagnosis: Other fatigue[ICD10: R53.83] Akanksha DRIVER

[facility]                    CPT-4: 15658              2017

 

                                        (32077) OFFICE/OUTPATIENT VISIT EST

Diagnosis: Pain in thoracic spine[ICD10: M54.6]

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 

Akanksha DRIVER [facility] CPT-4: 48367        2017

 

                                        (04516) OFFICE/OUTPATIENT VISIT EST

Diagnosis: Left lower quadrant pain[ICD10: R10.32]

Diagnosis: Low back pain[ICD10: M54.5] Akanksha SYKES 

[facility]                    CPT-4: 39383              2017

 

                                        (22874) OFFICE/OUTPATIENT VISIT EST

Diagnosis: Mixed hyperlipidemia[ICD10: E78.2]

Diagnosis: Essential (primary) hypertension[ICD10: I10]

Diagnosis: Hypoglycemia, unspecified[ICD10: E16.2] Akanksha DRIVER [facility]         CPT-4: 43232              2017

 

                                        (11596) OFFICE/OUTPATIENT VISIT EST

Diagnosis: Impaired fasting glucose[ICD10: R73.01]

Diagnosis: Mastodynia[ICD10: N64.4]

Diagnosis: Mixed hyperlipidemia[ICD10: E78.2]

Diagnosis: Essential (primary) hypertension[ICD10: I10]

Diagnosis: Other fatigue[ICD10: R53.83] Akanksha DRIVER

[facility]                    CPT-4: 46253              2017

 

                                        (08836) OFFICE/OUTPATIENT VISIT EST

Diagnosis: Acute upper respiratory infection, unspecified[ICD10: J06.9] Luba Stevenson              AKANKSHA DRIVER [facility] CPT-4: 21100        2017

 

                                        (35737) OFFICE/OUTPATIENT VISIT EST

Diagnosis: Acute mastoiditis without complications, right ear[ICD10: H70.001] 

Akanksha Villa DRIVER [facility] CPT-4: 51343        2016

 

                                        (67124) OFFICE/OUTPATIENT VISIT EST

Diagnosis: FLU VACCINE[ICD10: Z23] Akanksha KEY [facility]                       CPT-4: 29697              10/07/2016

 

                                        (01422) OFFICE/OUTPATIENT VISIT EST

Diagnosis: Mixed hyperlipidemia[ICD10: E78.2]

Diagnosis: Essential (primary) hypertension[ICD10: I10]

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

Diagnosis: Impaired fasting glucose[ICD10: R73.01] Akanksha IZQUIERDOTAMMY

[employer] DRIVER DO [facility]         CPT-4: 29046              2016

 

                                        (85893) OFFICE/OUTPATIENT VISIT EST

Diagnosis: Constipation, unspecified[ICD10: K59.00] Akanksha DRIVER DO [facility] CPT-4: 65851              2016

 

                                        (71829) OFFICE/OUTPATIENT VISIT EST

Diagnosis: Essential (primary) hypertension[ICD10: I10]

Diagnosis: Mixed hyperlipidemia[ICD10: E78.2]

Diagnosis: Chronic kidney disease, stage 1[ICD10: N18.1] Akanksha DRIVER [facility] CPT-4: 75499              2016

 

                                        (85797) OFFICE/OUTPATIENT VISIT EST

Diagnosis: Muscle weakness (generalized)[ICD10: M62.81]

Diagnosis: Dizziness and giddiness[ICD10: R42]

Diagnosis: Essential (primary) hypertension[ICD10: I10]

Diagnosis: History of falling[ICD10: Z91.81] Akankshatammy KEVINRAMOS DRIVER [facility]            CPT-4: 87705              2015

 

                                        (03933) OFFICE/OUTPATIENT VISIT EST

Diagnosis: FLU VACCINE[ICD10: Z23] Akanksha Otoolerodo        AKANKSHA OLIVIA KEY [facility]                       CPT-4: 81385              10/16/2015

 

                                        (24342) OFFICE/OUTPATIENT VISIT EST

Diagnosis: Acute renal failure[ICD9: 584.9]

Diagnosis: POLYURIA[ICD9: 788.42] Akankshatammy KEVINQUELINE SAramis GUERRERO LANCE [facility]                       CPT-4: 56388              09/15/2015

 

                                        (57930) OFFICE/OUTPATIENT VISIT EST

Diagnosis: HYPERLIPIDEMIA NEC/NOS[ICD9: 272.4]

Diagnosis: HYPERTENSION[ICD9: 401.9]

Diagnosis: CAD[ICD9: 414.00]

Diagnosis: Hyperglycemia[ICD9: 790.29]

Diagnosis: MALAISE AND FATIGUE[ICD9: 780.79] Akanksha DRIVER [employer] [facility]            CPT-4: 31817              09/10/2015

 

                                        (57718) OFFICE/OUTPATIENT VISIT EST

Diagnosis: MALAISE AND FATIGUE[ICD9: 780.79]

Diagnosis: HYPOGLYCEMIA[ICD9: 251.2]

Diagnosis: Grieving[ICD9: 309.0]

Diagnosis: ABDOMINAL PAIN[ICD9: 789.00] Akanksha DRIVER

[employer] [facility]                    CPT-4: 98417              2015

 

                                        OFFICE/OUTPATIENT VISIT EST

Diagnosis: CERUMEN IMPACTION[ICD9: 380.4]

Diagnosis: EUSTACHIAN TUBE DYSFUNCTION[ICD9: 381.81] Bertha Elieser      

AKANKSHA DRIVER [employer] [facility] CPT-4: 91482              2015

 

                                        (06659) OFFICE/OUTPATIENT VISIT EST

Diagnosis: Leg pain[ICD9: 729.5]

Diagnosis: SUPERFIC PHLEBITIS-LEG[ICD9: 451.0] Akanksha DRIVER [employer] [facility]            CPT-4: 86068              2015

 

                                        (01726) OFFICE/OUTPATIENT VISIT EST

Diagnosis: Thoracic back pain[ICD9: 724.1]

Diagnosis: SPASM OF MUSCLE[ICD9: 728.85] Akanksha DRIVER [employer] [facility]            CPT-4: 22832              2015

 

                                        (44816) OFFICE/OUTPATIENT VISIT EST

Diagnosis: DIZZINESS/VERTIGO[ICD9: 780.4]

Diagnosis: Benign positional vertigo[ICD9: 386.11]

Diagnosis: HYPERTENSION[ICD9: 401.9]

Diagnosis: Suspicious nevus[ICD9: 238.2] Akanksha DRIVER [employer] [facility]            CPT-4: 53299              2015

 

                                        (20757) OFFICE/OUTPATIENT VISIT EST

Diagnosis: FLU VACCINE[ICD10: Z23] Akanksha SPENCER

[facility]                       CPT-4: 71695              10/17/2014

 

                                        OFFICE/OUTPATIENT VISIT EST

Diagnosis: SINUSITIS, ACUTE[ICD9: 461.9]

Diagnosis: EUSTACHIAN TUBE DYSFUNCTION[ICD9: 381.81] [facility] CPT-[facility]: 99585              2014

 

                                        ([id number]) OFFICE/OUTPATIENT VISIT EST

Diagnosis: DIZZINESS/VERTIGO[ICD9: 780.4]

Diagnosis: HYPERTENSION[ICD9: 401.9] Akanksha OTOOLE

[facility]                       CPT-4: 27013              2014

 

                                        (13059) OFFICE/OUTPATIENT VISIT EST

Diagnosis: HYPERTENSION[ICD9: 401.9]

Diagnosis: MALAISE AND FATIGUE[ICD9: 780.79]

Diagnosis: SINUSITIS, ACUTE[ICD9: 461.9] [facility]            CPT-4: 52820              2014

 

                                        (96390) OFFICE/OUTPATIENT VISIT EST

Diagnosis: HYPERLIPIDEMIA NEC/NOS[ICD9: 272.4]

Diagnosis: HYPERTENSION[ICD9: 401.9]

Diagnosis: B12 DEFIC ANEMIA NEC[ICD9: 281.1]

Diagnosis: HYPOGLYCEMIA[ICD9: 251.2] [facility]                       CPT[facility]: 46906              2014

 

                                        OFFICE/OUTPATIENT VISIT EST

Diagnosis: COUGH[ICD9: 786.2] [facility] 

CPT-4: 43125                            2014

 

                                        OFFICE/OUTPATIENT VISIT EST

Diagnosis: COUGH[ICD9: 786.2]

Diagnosis: URI, ACUTE[ICD9: 465.9] Bertha SPENCER

[facility]                       CPT-4: 65726              10/15/2013

 

                                        (02100) OFFICE/OUTPATIENT VISIT EST

Diagnosis: HYPERTENSION[ICD9: 401.9]

Diagnosis: CEPHALGIA[ICD9: 784.0]

Diagnosis: ANXIETY STATE NOS[ICD9: 300.00]

Diagnosis: FLU VACCINE[ICD9: V04.81] Akanksha ROTHMANR [facility]                       CPT-4: 34385              2013

 

                                        (18080) OFFICE/OUTPATIENT VISIT EST

Diagnosis: DIZZINESS/VERTIGO[ICD9: 780.4]

Diagnosis: SINUSITIS, ACUTE[ICD9: 461.9]

Diagnosis: Benign positional vertigo[ICD9: 386.11] Akanksha DRIVER [facility]         CPT-4: 65902              2013

 

                                        OFFICE/OUTPATIENT VISIT EST

Diagnosis: OTITIS MEDIA NOS[ICD9: 382.9] Akanksha DRIVER [facility]            CPT-4: 44146              2013

 

                                        OFFICE/OUTPATIENT VISIT EST

Diagnosis: Perforation of ear drum[ICD9: 384.20]

Diagnosis: SINUSITIS, ACUTE[ICD9: 461.9] Akanksha DRIVER [facility]            CPT-4: 67915              05/10/2013

 

                                        (78042) OFFICE/OUTPATIENT VISIT EST

Diagnosis: Mastalgia[ICD9: 611.71] Akanksha SPENCER

[facility]                       CPT-4: 51853              2013

 

                                        (21922) OFFICE/OUTPATIENT VISIT EST

Diagnosis: HYPERLIPIDEMIA NEC/NOS[ICD9: 272.4]

Diagnosis: HYPERTENSION[ICD9: 401.9]

Diagnosis: DIZZINESS/VERTIGO[ICD9: 780.4]

Diagnosis: Hyperglycemia[ICD9: 790.29]

Diagnosis: MALAISE AND FATIGUE[ICD9: 780.79] [facility]            CPT-4: 80529              2012

 

                                        (36994) OFFICE/OUTPATIENT VISIT EST

Diagnosis: EUSTACHIAN TUBE DYSFUNCTION[ICD9: 381.81]

Diagnosis: DIZZINESS/VERTIGO[ICD9: 780.4]

Diagnosis: ABDOMINAL PAIN[ICD9: 789.00]

Diagnosis: GERD[ICD9: 530.81]

Diagnosis: CERUMEN IMPACTION[ICD9: 380.4] [employer]ha [employer]            CPT-4: 13333              2012

 

                                        OFFICE/OUTPATIENT VISIT EST

Diagnosis: ABDOMINAL PAIN[ICD9: 789.00]

Diagnosis: DYSPEPSIA[ICD9: 536.8] Akanksha LANCE [facility]                       CPT-4: 57501              10/23/2012

 

                                        (61888) OFFICE/OUTPATIENT VISIT EST

Diagnosis: ABDOMINAL PAIN[ICD9: 789.00]

Diagnosis: DYSPEPSIA[ICD9: 536.8]

Diagnosis: IBS[ICD9: 564.1] Akanksha DRIVER [facility] CPT

-

4: 18150                                10/10/2012

 

                                        OFFICE/OUTPATIENT VISIT EST

Diagnosis: DIZZINESS/VERTIGO[ICD9: 780.4]

Diagnosis: HYPOGLYCEMIA[ICD9: 251.2] Akanksha MEJIA [facility]                       CPT-4: 55195              2012

 

                                        ([id number]) OFFICE/OUTPATIENT VISIT EST

Diagnosis: URINARY TRACT INFECTION[ICD9: 599.0] Akanksha DRIVER [facility]            CPT-4: 73344              2012

 

                                        (64575) OFFICE/OUTPATIENT VISIT EST

Diagnosis: HYPERLIPIDEMIA NEC/NOS[ICD9: 272.4]

Diagnosis: HYPERTENSION[ICD9: 401.9]

Diagnosis: MALAISE AND FATIGUE[ICD9: 780.79]

Diagnosis: DIZZINESS/VERTIGO[ICD9: 780.4] Akanksha [employer]            CPT-4: 56732              2012

 

                                        (13205) OFFICE/OUTPATIENT VISIT EST

Diagnosis: DIZZINESS/VERTIGO[ICD9: 780.4]

Diagnosis: MALAISE AND FATIGUE[ICD9: 780.79]

Diagnosis: HYPERTENSION[ICD9: 401.9] Akanksha MEJIA [facility]                       CPT-4: 17161              2012

 

                                        OFFICE/OUTPATIENT VISIT EST

Diagnosis: LUMB/LUMBOSAC DISC DEGEN[ICD9: 722.52]

Diagnosis: Radiculopathy of leg[ICD9: 724.4]

Diagnosis: SACROILIITIS NEC[ICD9: 720.2]

Diagnosis: SPINAL ENTHESOPATHY[ICD9: 720.1] Akanksha DRIVER [facility]            CPT-4: 08009              2012

 

                                        (46029) OFFICE/OUTPATIENT VISIT EST

Diagnosis: PAIN, LOWER BACK[ICD9: 724.2]

Diagnosis: SPASM OF MUSCLE[ICD9: 728.85]

Diagnosis: SCIATICA[ICD9: 724.3]

Diagnosis: Lumbar degenerative disc disease[ICD9: 722.52] Akanksha DRIVER [facility] CPT-4: 81562              2012

 

                                        (64844) OFFICE/OUTPATIENT VISIT EST

Diagnosis: PAIN IN THORACIC SPINE[ICD9: 724.1]

Diagnosis: SPASM OF MUSCLE[ICD9: 728.85] Akanksha DRIVER [facility]            CPT-4: 42115              2012

 

                                        (22599) OFFICE/OUTPATIENT VISIT EST

Diagnosis: PAIN, LOWER BACK[ICD9: 724.2]

Diagnosis: SPASM OF MUSCLE[ICD9: 728.85]

Diagnosis: Sacroiliac dysfunction[ICD9: 739.4]

Diagnosis: Lumbar degenerative disc disease[ICD9: 722.52] Akanksha DRIVER [facility] CPT-4: 48127              2012

 

                                        OFFICE/OUTPATIENT VISIT EST

Diagnosis: MALAISE AND FATIGUE[ICD9: 780.79]

Diagnosis: HYPOTENSION[ICD9: 458.9]

Diagnosis: CAD[ICD9: 414.00] Akanksha DRIVER [facility] 

CPT-4: 06593                            2012

 

                                        OFFICE/OUTPATIENT VISIT EST

Diagnosis: SINUSITIS, ACUTE[ICD9: 461.9]

Diagnosis: PHARYNGITIS, ACUTE[ICD9: 462]

Diagnosis: CERUMEN IMPACTION[ICD9: 380.4] Akanksha [employer]            CPT-4: 25350              2011

 

                                        OFFICE/OUTPATIENT VISIT EST

Diagnosis: PAIN IN THORACIC SPINE[ICD9: 724.1]

Diagnosis: GERD[ICD9: 530.81]

Diagnosis: DIZZINESS/VERTIGO[ICD9: 780.4] Akanksha [employer]            CPT-4: 43818              2011

 

                OFFICE/OUTPATIENT VISIT EST Akanksha MEJIA [facility] CPT-

4: 50192                                2011

 

                    (06209) OFFICE/OUTPATIENT VISIT EST Akanksha CASTILLO S. [facility] 

[facility]                       CPT-4: 85997              2011

 

                                        (38485) OFFICE/OUTPATIENT VISIT, EST

Diagnosis: PAIN, LOWER BACK[ICD9: 724.2] Akanksha BAUMAN S.

 

[facility]            CPT-4: 19125              2011

 

                    (13068) OFFICE/OUTPATIENT VISIT, EST Akanksha DE LA ROSA S. [facility]

[facility]                       CPT-4: 10502              2011

 

                    (21319) OFFICE/OUTPATIENT VISIT, EST Akanksha DE LA ROSA S. [facility]

[facility]                       CPT-4: 93058              2010

 

                    (81826) OFFICE/OUTPATIENT VISIT, EST Akanksha DE LA ROSA S. [facility]

[facility]                       CPT-4: 68235              2010

 

                    (15759) OFFICE/OUTPATIENT VISIT, EST Akanksha DE LA ROSA S. [facility]

[facility]                       CPT-4: 28344              2010

 

                    (55810) OFFICE/OUTPATIENT VISIT, EST Akanksha DE LA ROSA S. [facility]

[facility]                       CPT-4: 83181              2010

 

                    (98184) OFFICE/OUTPATIENT VISIT, EST Akanksha DE LA ROSA S. [facility] DO

[facility]                       CPT-4: 83508              2010

 

                    (24515) OFFICE/OUTPATIENT VISIT, EST Akanksha DE LA ROSA S. [facility] DO

[facility]                       CPT-4: 34146              2010







Plan of Care





             Planned Activity Notes        Codes        Status       Date

 

                          Visit Diagnosis Plan: Generalized anxiety disorder Dis

cussion: Stable

                                        ICD-9 : 300.00

ICD-10 : F41.1

                                                    2020

 

                          Visit Diagnosis Plan: Essential (primary) hypertension

 Discussion: Stable

Follow Up: 1 months

                                        ICD-9 : 401.9

ICD-10 : I10

                                                    2020

 

                          Patient Education: metoprolol tartrate- [facility] [id number] 

[email or web address]

                                        Completed           2020

 

                          Visit Diagnosis Plan: Palpitations Discussion: Continu

e higher dose of 

metoprolol

                                        ICD-9 : 785.1

ICD-10 : R00.2

                                                    2020

 

                          Visit Diagnosis Plan: Essential (primary) hypertension

 Discussion: Improving 

with higher dose of metoprolol Recheck 2weeks

                                        ICD-9 : 401.9

ICD-10 : I10

                                                    2020

 

                                        Appointment: Akanksha Drivertel:[phone]

                                        [address] [place] [address]                                              FOLLOW UP       2020

 

                                        Appointment: Akanksha Drivertel:[phone]

                                        [address] [place] [address]              2020--moved up to Tues 20 at 4pm (km)                 [place]

NCELED        2020

 

                          Visit Diagnosis Plan: Essential hypertension Discussio

n: Increase metoprolol to 

25mg q AM and 50mg po q pM Recheck 1 week

                                        ICD-9 : 401.9

ICD-10 : I10

                                                    2020

 

                          Visit Diagnosis Plan: Palpitations Discussion: Check C

BC, CMP, TSH

                                        ICD-9 : 785.1

ICD-10 : R00.2

                                                    2020

 

                                        Appointment: Akanksha Drivertel:[phone]

                                        [address] [place] [address]                                              BP CHECK        2020

 

                                        Appointment: Akanksha Drivertel:[phone]

                                        [address] [place] [address]                                              ACUTE ILLNESS   2020

 

                          Patient Education: metoprolol tartrate- [facility] [id number] 

https://www.Trackway.com/samplemd/resources/getResource/61/5i080426-8377-6g10-1n

                                        Completed           2020

 

                    Care Plan: X-RAY EXAM NECK SPINE 4/5VWS                     

LOINC : 40246-5

                          Pending                   2020

 

                    Care Plan: X-RAY EXAM THORAC SPINE4/>VW                     

LOINC : 76988-4

                          Pending                   2020

 

                                        Appointment: Akanksha Driverl:[phone]

                                        [address] [place], [place] [id number]

[place]                                              LAB             2019

 

                                        Appointment: Akanksha Driver

WPtel:[phone]

                                        [address] [place], [place] [id number]

[place]                                              INJECTION       10/22/2019

 

             Patient Education: INFLUENZA VACCINE CDC                           

Completed    10/22/2019

 

                                        Appointment: Akanksha Driver

WPtel:[phone]

                                        [address] [place], [place] [address]                                              LAB             10/11/2019

 

                          Visit Diagnosis Plan: Acute serous otitis media, left 

ear Discussion: instructed

to use flonase and claritin daily for symptom relief. discussed with patient 
that if no improvement in 2 weeks, will need to follow up with ENT for audiology
exam. instructed to call office with any other symptoms such as otalgia, 
dysphagia, fever, etc.

                                        ICD-9 : 381.01

ICD-10 : H65.02

                                                    2019

 

                                        Appointment: Nat Lozoya

                                        [address] [place] [address]                                              ACUTE ILLNESS   2019

 

                          Visit Diagnosis Plan: Essential (primary) hypertension

 Discussion: Stable Check 

CMP

                                        ICD-9 : 401.9

ICD-10 : I10

                                                    06/10/2019

 

                          Visit Diagnosis Plan: Other fatigue Discussion: Check 

CBC, TSH

                                        ICD-9 : 780.79

ICD-10 : R53.83

                                                    06/10/2019

 

                          Visit Diagnosis Plan: Hypoglycemia, unspecified Discus

madeleine: Monitor BS At least 

6 small meals a day each with protein

                                        ICD-9 : 251.2

ICD-10 : E16.2

                                                    06/10/2019

 

                          Visit Diagnosis Plan: Urinary tract infection, site no

t specified Discussion: 

Started an old abx prescription

                                        ICD-9 : 599.0

ICD-10 : N39.0

                                                    06/10/2019

 

                                        Appointment: Akanksha Driver

WPtel:[phone]

                                        [address] [place] [address]                                              FOLLOW UP       06/10/2019

 

                          Visit Diagnosis Plan: Urinary tract infection, site no

t specified Discussion: 

Macrobid 100mg po BID for 1 week

                                        ICD-9 : 599.0

ICD-10 : N39.0

                                                    2019

 

                          Visit Diagnosis Plan: Gastro-esophageal reflux disease

 without esophagitis 

Discussion: Stable on omeprazole

Follow Up: 3 months

                                        ICD-9 : 530.81

ICD-10 : K21.9

                                                    2019

 

                          Visit Diagnosis Plan: Essential (primary) hypertension

 Discussion: Stable Sees 

Dr. Clements this month

                                        ICD-9 : 401.9

ICD-10 : I10

                                                    2019

 

                                        Appointment: Akanksha Driver

WPtel:[phone]

                                        [address] [place], [place] [id number]

[place]                                              FOLLOW UP       2019

 

                          Patient Education: metoprolol tartrate- [facility] [id number] 

https://www.[employer]/resources/getResource/61/[email or web address]-54dx-91

                                        Completed           2019

 

                                        Appointment: Akanksha Driver

WPtel:[phone]

                                        [address] [place], [place] [address]

[place]                                              CANCELED        02/15/2019

 

                          Visit Diagnosis Plan: Gastro-esophageal reflux disease

 without esophagitis 

Discussion: Continue protonix and carafate Proceed with updated EGD

                                        ICD-9 : 530.81

ICD-10 : K21.9

                                                    2018

 

                          Visit Diagnosis Plan: Epigastric pain Discussion: [facility]

k CT abdomen/pelvis due to

ongoing abdominal pain

                                        ICD-9 : 789.06

ICD-10 : R10.13

                                                    2018

 

                                        Appointment: Akanksha Driver

WPtel:[phone]

                                        [address] [place], [place] [address]

[place]                                              FOLLOW UP       2018

 

                    Care Plan: CT PELVIS W/O DYE                     LOINC : 361

08-9

                          Pending                   2018

 

                    Care Plan: CT ABDOMEN W/O DYE                     LOINC : 36

103-0

                          Pending                   2018

 

                    Care Plan: Referral Order                     SNOMED-CT : 30

[id number]

                          Pending                   2018

 

                                        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

art disease of native coronary artery 

without angina pectoris                 Discussion: S/P cardiac cath--doing medi

vicki management 

with imdur and metoprolol increased Has fwup with cardiology next week Discussed
cardiac rehab

                                        ICD-9 : 414.00

ICD-10 : I25.10

                                                    2018

 

                          Visit Diagnosis Plan: Essential (primary) hypertension

 Discussion: Stable

                                        ICD-9 : 401.9

ICD-10 : I10

                                                    2018

 

                          Visit Diagnosis Plan: Gastro-esophageal reflux disease

 without esophagitis 

Discussion: Continue protonix at BID dosing and carafate BID

Follow Up: 2 months

                                        ICD-9 : 530.81

ICD-10 : K21.9

                                                    2018

 

                          Visit Diagnosis Plan: Generalized anxiety disorder Dis

cussion: Stable

                                        ICD-9 : 300.00

ICD-10 : F41.1

                                                    2018

 

                                        Appointment: Akanksha Drivertel:[phone]

                                        [address] [place], [place] [id number]

                                              FOLLOW UP       2018

 

                          Visit Diagnosis Plan: Gastro-esophageal reflux disease

 without esophagitis 

Discussion: Continue protonix at BID dosing Continue carafate at q AC and HS 
dosing for 2 more weeks then decrease to BID dosing

Follow Up: 4 weeks

                                        ICD-9 : 530.81

ICD-10 : K21.9

                                                    10/02/2018

 

                          Visit Diagnosis Plan: Generalized anxiety disorder Dis

cussion: Increase xanax to

BID dosing especially with upcoming cardiac testing which is already making 
patient more anxious and worried

                                        ICD-9 : 300.00

ICD-10 : F41.1

                                                    10/02/2018

 

                          Visit Diagnosis Plan: Palpitations Discussion: Cardilo

logy going to schedule 

Lexiscan

                                        ICD-9 : 785.1

ICD-10 : R00.2

                                                    10/02/2018

 

                          Visit Diagnosis Plan: Urinary tract infection, site no

t specified Discussion: 

Reculture urine

                                        ICD-9 : 599.0

ICD-10 : N39.0

                                                    10/02/2018

 

                                        Appointment: Akanksha Drivertel:[phone]

                                        [address] [place], [place] [address]

                                              FOLLOW UP       10/02/2018

 

             Patient Education: Patient Medication Summary                      

     Completed    10/02/2018

 

                                        Appointment: Akanksha Drivertel:[phone]

                                        [address] [place], [place] [id number]

                                              LAB             2018

 

             Patient Education: Patient Medication Summary                      

     Completed    2018

 

                          Visit Diagnosis Plan: Generalized anxiety disorder Dis

cussion: Did discuss 

increased risk of benzodiazepines causing dementia but at this time will stay at
xanax 0.25mg po BID

                                        ICD-9 : 300.00

ICD-10 : F41.1

                                                    2018

 

                          Visit Diagnosis Plan: Epigastric pain Discussion: Cont

inue protonix and carafate

but make into a slurry Flu shot given Discussed EGD if symptoms persist

Follow Up: 2 weeks

                                        ICD-9 : 789.06

ICD-10 : R10.13

                                                    2018

 

                                        Appointment: Akanksha Driver

WPtel:[phone]

                                        [address] [place] [address]                                              FOLLOW UP       2018

 

             Patient Education: Patient Medication Summary                      

     Completed    2018

 

                          Visit Diagnosis Plan: Gastro-esophageal reflux disease

 without esophagitis 

Discussion: Change to protonix 40mg po BID

Follow Up: 1 months

                                        ICD-9 : 530.81

ICD-10 : K21.9

                                                    2018

 

                          Visit Diagnosis Plan: Essential (primary) hypertension

 Discussion: Has 

hydralazine to use prn per cardiology Going to do 30 day holter monitor

                                        ICD-9 : 401.9

ICD-10 : I10

                                                    2018

 

                          Visit Diagnosis Plan: Hypoglycemia, unspecified Discus

madeleine: 6 small meals a 

day--each with protein Increase fluid intake

                                        ICD-9 : 251.2

ICD-10 : E16.2

                                                    2018

 

                                        Appointment: Akanksha Driver

WPtel:[phone]

                                        [address] [place] [address]                                              ACUTE ILLNESS   2018

 

             Patient Education: Patient Medication Summary                      

     Completed    2018

 

                          Visit Diagnosis Plan: Dizziness and giddiness Discussi

on: blood work to be 

completed in office including cmp, cbc, troponin to rule out glucose 
intolerance, infection, dehydration. instructed patient that she needs to see 
her cardiologist sooner than oct and patient requested we contact dr. clements's 
office. will have front office make appt. patient has carotid doppler annually.

                                        ICD-9 : 780.4

ICD-10 : R42

                                                    2018

 

                                        Appointment: Nat Lozoya

                                        [address] [place] [address]                                              ACUTE ILLNESS   2018

 

             Patient Education: Patient Medication Summary                      

     Completed    2018

 

                          Visit Diagnosis Plan: Cervicalgia Discussion: Complete

 course of PT since 

symptoms improved Continue stretching exercises Notify if symptoms return or 
worsen

                                        ICD-9 : 723.1

ICD-10 : M54.2

                                                    2018

 

                                        Appointment: Akanskha Driver

WPtel:[phone]

                                        [address] [place] [address]                                              FOLLOW UP       2018

 

             Patient Education: Patient Medication Summary                      

     Completed    2018

 

                          Visit Diagnosis Plan: Vertigo of central origin, bilat

eral Discussion: Discussed

PT for vestibular therapy

                                        ICD-9 : 386.2

ICD-10 : H81.43

                                                    2018

 

                          Visit Diagnosis Plan: Other spondylosis with radiculop

athy, cervical region 

Discussion: Check MRI of cervical spine

                                        ICD-9 : 721.0

ICD-10 : M47.22

                                                    2018

 

                          Visit Diagnosis Plan: Hypoglycemia, unspecified Discus

madeleine: Eat something with 

protein every 2 hrs

                                        ICD-9 : 251.2

ICD-10 : E16.2

                                                    2018

 

                                        Appointment: Akanksha Driver

WPtel:[phone] [address]                                                              2018

 

             Patient Education: Patient Medication Summary                      

     Completed    2018

 

                    Care Plan: MRI NECK SPINE W/O DYE                     LOINC 

: 26393-6

                          Pending                   2018

 

                          Visit Diagnosis Plan: Benign paroxysmal vertigo, bilat

eral Discussion: patient 

has meclizine at home but states it makes her too tired. instructed patient to 
cut in half and take at night. if it doesn't cause too much drowsiness, 
instructed to take bid until feeling better. instructed to rtc wed if no 
improvement or worsening symptoms. instructed patient to increase fluid intake 
as well.

                                        ICD-9 : 386.11

ICD-10 : H81.13

                                                    2018

 

                          Visit Diagnosis Plan: Otalgia, bilateral Discussion: k

enalog 40 mg given to 

patient im. instructed patient to monitor blood sugar at home due to risk of 
increased sugar. instructed patient to rtc on wednesday if no improvement and 
may require antibiotic.

                                        ICD-9 : 388.70

ICD-10 : H92.03

                                                    2018

 

                                        Appointment: Nat Lozoya

                                        [address] [place], [place] [id number]                                              ACUTE ILLNESS   2018

 

             Patient Education: Patient Medication Summary                      

     Completed    2018

 

                          Visit Diagnosis Plan: Low back pain Discussion: Stretc

hes Topical aspercreme 

Tylenol 1 po TID

                                        ICD-9 : 724.2

ICD-10 : M54.5

                                                    2018

 

                          Visit Diagnosis Plan: Radiculopathy, lumbosacral regio

n Discussion: As above

                                        ICD-9 : 724.4

ICD-10 : M54.17

                                                    2018

 

                          Visit Diagnosis Plan: Left lower quadrant pain Discuss

ion: Culture urine Notify 

if worsens

                                        ICD-9 : 789.04

ICD-10 : R10.32

                                                    2018

 

                                        Appointment: Akanksha Driver

WPtel:[phone]

                                        [address] [place], [place] [id number]                                              ACUTE ILLNESS   2018

 

             Patient Education: Patient Medication Summary                      

     Completed    2018

 

                                        Appointment: Akanksha Driver

WPtel:[phone]

                                        [address] [place], [place] [id number]

US                                              INJECTION       10/06/2017

 

             Patient Education: Patient Medication Summary                      

     Completed    10/06/2017

 

                                        Appointment: Akanksha Driver

WPtel:[phone]

                                        [address] [place], [place] [id number]

                                              LAB             2017

 

             Patient Education: Patient Medication Summary                      

     Completed    2017

 

                          Visit Diagnosis Plan: Other intervertebral disc degene

ration, lumbar region 

Follow Up: 3 months

                                        ICD-9 : 722.52

ICD-10 : M51.36

                                                    2017

 

                          Visit Diagnosis Plan: Pain in thoracic spine Discussio

n: PT has helped Continue 

stretches and walking [facility]ed [facility]

                                        ICD-9 : 724.1

ICD-10 : M54.6

                                                    2017

 

                                        Appointment: Akanksha Driver

WPtel:[phone]

                                        [address] [place], [place] [id number]

                                              FOLLOW UP       2017

 

             Patient Education: Patient Medication Summary                      

     Completed    2017

 

                          Visit Diagnosis Plan: Left lower quadrant pain Discuss

ion: Had CT scan of 

abdomen/pelvis in 2017 and normal colonoscopy in 2015

                                        ICD-9 : 789.04

ICD-10 : R10.32

                                                    2017

 

                          Visit Diagnosis Plan: Low back pain Discussion: Start 

PT for low back- Seems 

like abdominal pain is radiating from low back

Follow Up: 5 weeks

                                        ICD-9 : 724.2

ICD-10 : M54.5

                                                    2017

 

                                        Appointment: Akanksha Driver

WPtel:[phone]

                                        [address] [place], [place] [address]                                              ACUTE ILLNESS   2017

 

             Patient Education: Patient Medication Summary                      

     Completed    2017

 

                                        Appointment: Akanksha Driver

WPtel:[phone]

                                        [address] [place] [address]                                              LAB             2017

 

             Patient Education: Patient Medication Summary                      

     Completed    2017

 

                          Visit Diagnosis Plan: Mixed hyperlipidemia Discussion:

 Check lipids

                                        ICD-9 : 272.4

ICD-10 : E78.2

                                                    2017

 

                          Visit Diagnosis Plan: Mastodynia Discussion: Check Jace

ateral diagnostic 

mammogram with US

                                        ICD-9 : 611.71

ICD-10 : N64.4

                                                    2017

 

                          Visit Diagnosis Plan: Impaired fasting glucose Discuss

ion: Return in AM for CMP,

HbA1C Accuchecks daily Diet/Exercise discussed at length

                                        ICD-9 : 790.29

ICD-10 : R73.01

                                                    2017

 

                          Visit Diagnosis Plan: Other fatigue Discussion: Check 

CBC, TSH

                                        ICD-9 : 780.79

ICD-10 : R53.83

                                                    2017

 

                                        Appointment: Akanksha Driver

WPtel:[phone]

                                        [address] [place], [place] [id number]              3/7 confirmed `sl                 ACUTE ILLNESS   2017

 

             Patient Education: Patient Medication Summary                      

     Completed    2017

 

                    Care Plan: MAMMOGRAM SCREENING                     LOINC : 2

6347-5

                          Pending                   2017

 

                          Visit Diagnosis Plan: Acute upper respiratory infectio

n, unspecified Discussion:

Exam is reassuring Likely viral illness Supportive care reviewed and encouraged 
Follow up PRN

                                        ICD-9 : 465.9

ICD-10 : J06.9

                                                    2017

 

                                        Appointment: Luba Stevenson 

                                        [address] [place], [place] [id number]                                              ACUTE ILLNESS   2017

 

             Patient Education: Patient Medication Summary                      

     Completed    2017

 

                          Visit Plan:               Supportive care. Rest, Fluid

s, Tylenol/Motrin prn fever or 

bodyaches. Notify if worsening symptoms.

                                                            2016

 

                                        Appointment: Akanksha Driver

WPtel:[phone]

                                        [address] [place] [address]                                              ACUTE ILLNESS   2016

 

             Patient Education: Patient Medication Summary                      

     Completed    2016

 

                                        Appointment: Akanksha Driver

WPtel:[phone]

                                        [address] [place], [place] [id number]

US                                              INJECTION       10/07/2016

 

             Patient Education: Patient Medication Summary                      

     Completed    10/07/2016

 

                                        Appointment: Akanksha Driver

WPtel:[phone]

                                        [address] [place], [place] [id number]

US                                              LAB             2016

 

             Patient Education: Patient Medication Summary                      

     Completed    2016

 

                          Visit Plan:               Add miralax daily Use daily 

probiotic and metamucil and push fluids

Notify if worsens/persists

                                                            2016

 

                                        Appointment: Akanksha Driver

WPtel:[phone]

                                        [address] [place] [address]              16 confirmed ~sl                 ACUTE ILLNESS   2016

 

             Patient Education: Patient Medication Summary                      

     Completed    2016

 

                          Visit Plan:               No NSAIDs Kidney function di

scussed Discussed hydration Monitor lab

every 4months so will check CMP, HbA1C next month

                                                            2016

 

                                        Appointment: Akanksha Driver

WPtel:[phone]

                                        [address] [place] [address]              03/15 confirmed ~sl                 ACUTE ILLNESS   2016

 

             Patient Education: Patient Medication Summary                      

     Completed    2016

 

                          Visit Plan:               Vestibular exercises Refill 

flonase Strict low Na diet--discussed 

that needs to read labels Rx for walker with chair given due to muscle 
weakness/unsteadiness Has carotids and abdominal aorta and legs checked in 
January Check Chem 7

                                                            2015

 

                                        Appointment: Akanksha Driver

WPtel:[phone]

                                        [address] [place] [address]              12/15/15 appt confirmed cn                 FOLLOW UP       

 

             Patient Education: Patient Medication Summary                      

     Completed    2015

 

             Patient Education: Vertigo                           Completed    1

2015

 

                                        Appointment: Akanksha Driver

WPtel:[phone]

                                        [address] [place], [place] [id number]

US                                              INJECTION       10/16/2015

 

             Patient Education: Patient Medication Summary                      

     Completed    10/16/2015

 

                                        Appointment: Akanksha Driver

WPtel:[phone]

                                        [address] [place], [place] [id number]

US                                              UA              09/15/2015

 

             Patient Education: Patient Medication Summary                      

     Completed    09/15/2015

 

                                        Referral: Landon De La Torre

WPtel:[phone]

#1 [facility] Uma Vásquez

[id number]

US              Referral                        Completed       2015

 

                                        Appointment: Akanksha Driver

WPtel:[phone]

                                        [address] [place] [address]                                              LAB             09/10/2015

 

             Patient Education: Patient Medication Summary                      

     Completed    09/10/2015

 

                          Visit Plan:               Check CMP, CBC, TSH, Free T4

, B12, Lipids, HbA1C Discussed 6 small 

meals a day each with protein Would likely benefit from antidepressant Update 
colonoscopy

                                                            2015

 

                                        Appointment: Akanksha Driver

WPtel:[phone]

                                        [address] [place] [address]              9/8/15 confirmed                 ACUTE ILLNESS   2015

 

             Patient Education: Patient Medication Summary                      

     Completed    2015

 

                          Visit Plan:               Cerumen flush with warm wate

r and peroxide - good results Resume 

Nasonex nasal spray daily Recommended Debrox earwax removal drops

                                                            2015

 

                                        Appointment: Bertha Mon

WPtel:[phone]

                                        [address] [place] [address]                                              ACUTE ILLNESS   2015

 

             Patient Education: Patient Medication Summary                      

     Completed    2015

 

                          Visit Plan:               Continue aspirin daily Add m

eloxicam for 1week Elevate and Ice Call

on 2015

 

                                        Appointment: Akanksha Driver

WPtel:[phone]

                                        [address] [place] [address]                                              ACUTE ILLNESS   2015

 

             Patient Education: Patient Medication Summary                      

     Completed    2015

 

                          Visit Plan:               Been using baclofen at [facility] and has helped some PT for next 

2-4weeks

                                                            2015

 

                                        Appointment: Akanksha Driver

WPtel:[phone]

                                        [address] [place], [place] [id number] [place] [address] Follow Up 2015

 

             Patient Education: Patient Medication Summary                      

     Completed    2015

 

                                        Appointment: Akanksha Driver

WPtel:[phone]

                                        [address] [place] [place] [id number]                                              LAB             2015

 

                                        Appointment: Akanksha Driver

WPtel:[phone]

                                        [address] [place], [place] [id number]              feeling better, weather -                 FOLLOW UP       

 

                                        Referral: Landon De La Torre

WPtel:[phone]

#1 [facility] [place]

Henri LEE

[id number]

US              Referral                        Appointment Requested 2015

 

                          Visit Plan:               Continue exercise and curren

t meds See surgery for removal of right

arm lesion

                                                            2015

 

                                        Appointment: Akanksha Driver

WPtel:[phone]

                                        [address] [place], [place] [id number]                                              FOLLOW UP       2015

 

             Patient Education: Patient Medication Summary                      

     Completed    2015

 

                                        Appointment: Akanksha Driver

WPtel:[phone]

                                        [address] [place], [place] [id number]                                              INJECTION       10/17/2014

 

             Patient Education: Patient Medication Summary                      

     Completed    10/17/2014

 

                                        Appointment: Bertha Mon

WPtel:[phone]

                                        [address] [place], [place] [id number]                                              ACUTE ILLNESS   2014

 

             Patient Education: Patient Medication Summary                      

     Completed    2014

 

                          Visit Plan:               Discussed that likely lumbar

 etiology for leg weakness Will change 

amlodopine to low dose dyazide and see if helps legs and inner ear

                                                            2014

 

                                        Appointment: Akanksha Driver

WPtel:[phone]

                                        [address] [place], [place] [id number]

                                              FOLLOW UP       2014

 

             Patient Education: Patient Medication Summary                      

     Completed    2014

 

                          Visit Plan:               Ceftin and continue claritin

/flonase Decrease amlodopine to 2.5mg q

HS until fwup with Card due to weakness

                                                            2014

 

                                        Appointment: Akanksha Driver

WPtel:[phone]

                                        [address] [place] [address]                                              ACUTE ILLNESS   2014

 

             Patient Education: Patient Medication Summary                      

     Completed    2014

 

                                        Appointment: Akanksha Driver

WPtel:[phone]

                                        [address] [place] [address]                                              LAB             2014

 

             Patient Education: Patient Medication Summary                      

     Completed    2014

 

                                        Appointment: Bertha Mon

WPtel:[phone]

                                        [address] [place] [address]                                              ACUTE ILLNESS   2014

 

             Patient Education: Patient Medication Summary                      

     Completed    2014

 

                          Visit Plan:               Supportive care. Rest, Fluid

s, Tylenol prn fever or bodyaches. 

Notify if worsening symptoms. Ceftin

                                                            10/15/2013

 

                                        Appointment: Bertha Mon

WPtel:[phone]

                                        [address] [place] [address]                                              ACUTE ILLNESS   10/15/2013

 

             Patient Education: Patient Medication Summary                      

     Completed    10/15/2013

 

                                        Appointment: Akanksha Driver

WPtel:[phone]

                                        [address] [place] [address]                                              BP CHECK        2013

 

             Patient Education: Patient Medication Summary                      

     Completed    2013

 

                                        Appointment: Akanksha Driver

WPtel:[phone]

                                        [address] [place] [address]                                              ER Follow UP    2013

 

             Patient Education: Patient Medication Summary                      

     Completed    2013

 

                          Visit Plan:               Restart flonase BID Use mecl

izine 25mg q HS Vestibular exercises 

Claritin 10mg q AM See ENT if doesn't resolve

                                                            2013

 

                                        Appointment: Akanksha Driver

WPtel:[phone]

                                        [address] [place] [address]                                              ACUTE ILLNESS   2013

 

             Patient Education: Patient Medication Summary                      

     Completed    2013

 

                          Visit Plan:               Will continue to observe

                                                            2013

 

                                        Appointment: Akanksha Driver

WPtel:[phone]

                                        [address] [place] [address]                                              FOLLOW UP       2013

 

             Patient Education: Patient Medication Summary                      

     Completed    2013

 

                          Visit Plan:               ERx for Augmentin 875mg q12 

x 10 days and Floxin otic drops 5 gtts 

AD x7days Sample of Nasonex per pt request Discussed treatment (nasal saline, 
salt water gargles, keeping right ear clean and dry, for worsening go to UC on 
weekend, Tylenol/ibuprofen, etc.) RTC 2 weeks for ear recheck.

                                                            05/10/2013

 

                                        Appointment: Jill Jean 

WPtel:[phone]

                                        [address] [place] [address]                                              ACUTE ILLNESS   05/10/2013

 

             Patient Education: Patient Medication Summary                      

     Completed    05/10/2013

 

                          Visit Plan:               Decrease caffeine intake Marina

ck Bilateral Mammogram with US of left 

breast

                                                            2013

 

                                        Appointment: Akanksha Driver

WPtel:[phone]

                                        [address] [place] [address]                                              ACUTE ILLNESS   2013

 

             Patient Education: Patient Medication Summary                      

     Completed    2013

 

                                        Appointment: Kate Hall

WPtel:[phone]

                                        [address] [place] [address]              appt scheduled                  ACUTE ILLNESS   2013

 

                                        Appointment: Akanksha Driver

WPtel:[phone]

                                        [address] [place] [address]

US                                              LAB             2012

 

             Patient Education: Patient Medication Summary                      

     Completed    2012

 

                          Visit Plan:               Continue off carafate and se

e how does Continue probiotic Add 

Vestibular exercises and use meclizine prn Continue Nasonex Check fasting lab 
including CMP, Lipids, CBC, TSH, Free T4, HbA1C

                                                            2012

 

                                        Appointment: Akanksha Driver

WPtel:[phone]

                                        [address] [place] [address]                                              FOLLOW UP       2012

 

             Patient Education: Patient Medication Summary                      

     Completed    2012

 

                          Visit Plan:               Continue carafate for 4more 

weeks at current dose then decrease to 

BID for 2wks then q HS

                                                            10/23/2012

 

                                        Appointment: Akanksha Drivertel:[phone]

                                        [address] [place] [address]                                              FOLLOW UP       10/23/2012

 

             Patient Education: Patient Medication Summary                      

     Completed    10/23/2012

 

                          Visit Plan:               Continue omeprazole Add Ellyn

fate 1gm po q AC Add daily probiotic

                                                            10/10/2012

 

                                        Appointment: Akanksha Driver

WPtel:[phone]

                                        [address] [place] [address]                                              ACUTE ILLNESS   10/10/2012

 

             Patient Education: Patient Medication Summary                      

     Completed    10/10/2012

 

                                        Appointment: Akanksha Drivertel:[phone]

                                        [address] [place] [address]

US                                              INJECTION       2012

 

             Patient Education: Patient Medication Summary                      

     Completed    2012

 

                          Visit Plan:               Diabetic Diet Accuchecks BID

 alternating times Hold onglyza and 

Januvia for now and continue diet and exercise and weight loss and moniter BS 
Discussed hypoglycemia and snack of peanut butter and crackers with juice or 
milk

                                                            2012

 

                                        Appointment: Akanksha Drivertel:[phone]

                                        [address] [place] [address]                                              WORK IN         2012

 

             Patient Education: Patient Medication Summary                      

     Completed    2012

 

                                        Appointment: Akanksha Drivertel:[phone]

                                        [address] [place], [place] [id number]                                              UA              2012

 

             Patient Education: Patient Medication Summary                      

     Completed    2012

 

                                        Appointment: Akanksha Drivertel:[phone]

                                        [address] [place], [place] [address]                                              LAB             2012

 

             Patient Education: Patient Medication Summary                      

     Completed    2012

 

                                        Appointment: Akanksha Drivertel:[phone]

                                        [address] [place], [place] [address]                                              ACUTE ILLNESS   2012

 

             Patient Education: Patient Medication Summary                      

     Completed    2012

 

                          Visit Plan:               Injection to Right SI joint 

as above Pt will call in 3 days on pain

Has PT starting in 2wks.

                                                            2012

 

                                        Appointment: Akanksha Drivertel:[phone]

                                        [address] [place] [address]                                              ACUTE ILLNESS   2012

 

             Patient Education: Patient Medication Summary                      

     Completed    2012

 

                          Visit Plan:               OMT done Start PT May need u

pdated MRI if need to consider epidural

Prednisone

                                                            2012

 

                                        Appointment: Akanksha Driver

WPtel:[phone]

                                        [address] [place] [address]                                              OMT             2012

 

             Patient Education: Patient Medication Summary                      

     Completed    2012

 

                          Visit Plan:               Flexeril and Vimovo OMT done

 Daily stretches

                                                            2012

 

                                        Appointment: Akanksha Driver

WPtel:[phone]

                                        [address] [place] [address]                                              ACUTE ILLNESS   2012

 

             Patient Education: Patient Medication Summary                      

     Completed    2012

 

                          Visit Plan:               OMT done Daily stretches Vinod

st heat or biofreeze Right SI joint 

injection Call in 1week Vimovo BID

                                                            2012

 

                                        Appointment: Akanksha Drivertel:[phone]

                                        [address] [place] [address]                                              ACUTE ILLNESS   2012

 

             Patient Education: Patient Medication Summary                      

     Completed    2012

 

                                        Appointment: Akanksha Drivertel:[phone]

                                        [address] [place] [address]

US                                              LAB             2012

 

             Patient Education: Patient Medication Summary                      

     Completed    2012

 

                          Visit Plan:               Decrease amlodopine to 1.25m

g daily Schedule with Cardiology 

Fasting lab in AM

                                                            2012

 

                                        Appointment: Akanksha Drivertel:[phone]

                                        [address] [place] [address]                                              ACUTE ILLNESS   2012

 

             Patient Education: Patient Medication Summary                      

     Completed    2012

 

                          Visit Plan:               Saline nasal flushes prn. Ty

lenol/Motrin prn headache. Notify if 

persists/symptoms worsening. Cerumen removal from ears as above

                                                            2011

 

                                        Appointment: Akanksha Driver

WPtel:[phone]

                                        [address] [place] [address]                                              ACUTE ILLNESS   2011

 

             Patient Education: Patient Medication Summary                      

     Completed    2011

 

                                        Appointment: Akanksha Driver

WPtel:[phone]

                                        [address] [place] [address]

US                                              INJECTION       10/05/2011

 

             Patient Education: Patient Medication Summary                      

     Completed    10/05/2011

 

                          Visit Plan:               Continue vimovo for 1more we

ek Increase Omeprazole to BID for 1mo 

then resume QD if stomach improved Vestibular exercises with meclizine q HS for 
next week

                                                            2011

 

                                        Appointment: Akanksha Driver

WPtel:[phone]

                                        [address] [place] [address]                                              FOLLOW UP       2011

 

             Patient Education: Patient Medication Summary                      

     Completed    2011

 

                                        Appointment: Akanksha Driver

WPtel:[phone]

                                        [address] [place] [address]                                              ACUTE ILLNESS   2011

 

             Patient Education: Patient Medication Summary                      

     Completed    2011

 

                                        Appointment: Akanksha Driver

WPtel:[phone]

                                        [address] [place] [address]                                              LAB             2011

 

             Patient Education: Patient Medication Summary                      

     Completed    2011

 

                          Visit Plan:               Saline nasal flushes prn. Ty

lenol/Motrin prn headache. Notify if 

persists/symptoms worsening. Add Veramyst Increase Omeprazole to 20mg po BID

                                                            2011

 

                                        Appointment: Akanksha Driver

WPtel:[phone]

                                        [address] [place] [address]                                              ACUTE ILLNESS   2011

 

             Patient Education: Patient Medication Summary                      

     Completed    2011

 

                                        Appointment: Akanksha Driver

WPtel:[phone]

                                        [address] [place] [address]                                              FOLLOW UP       2011

 

             Patient Education: Patient Medication Summary                      

     Completed    2011

 

                                        Appointment: Akanksha Driver

WPtel:[phone]

                                        [address] [place] [address]                                              ACUTE ILLNESS   2011

 

             Patient Education: Patient Medication Summary                      

     Completed    2011

 

                          Visit Plan:               Nasocourt sample given. Pt. 

will notify if symptoms are worse on 

Monday.

                                                            2010

 

                                        Appointment: Kate Hall

WPtel:[phone]

                                        [address] [place] [address]                                              ACUTE ILLNESS   2010

 

             Patient Education: Patient Medication Summary                      

     Completed    2010

 

                                        Appointment: Akanksha Driver

WPtel:[phone]

                                        [address] [place] [address]

US                                              INJECTION       10/06/2010

 

             Patient Education: Patient Medication Summary                      

     Completed    10/06/2010

 

                                        Appointment: Akanksha Driver

WPtel:[phone]

                                        [address] [place] [address]                                              FOLLOW UP       2010

 

             Patient Education: Patient Medication Summary                      

     Completed    2010

 

             Patient Education: Lexapro                           Completed    0

2010

 

                          Visit Plan:               May proceed with hiatal mykel

ia repair per Card. so will contact Dr. Cash's office to proceed with surgery Trial of Lexapro 5mg QD plus use 
xanax prn

                                                            2010

 

                                        Appointment: Akanksha Driver

WPtel:[phone]

                                        [address] [place] [address]                                              FOLLOW UP       2010

 

             Patient Education: Patient Medication Summary                      

     Completed    2010

 

                          Visit Plan:               B12 given Cont oral B12 and 

iron Fwup 1mo for B12 Proceed with 

hiatal hernia repair once card clearance

                                                            2010

 

                                        Appointment: Akanksha Driver

WPtel:[phone]

                                        [address] [place] [address]                                              FOLLOW UP       2010

 

             Patient Education: Patient Medication Summary                      

     Completed    2010

 

                                        Appointment: Akanksha Driver

WPtel:[phone]

                                        [address] [place] [address]                                              FOLLOW UP       2010

 

             Patient Education: Patient Medication Summary                      

     Completed    2010

 

                                        Appointment: Akanksha Driver

WPtel:[phone]

                                        [address] [facility]

US                                              LAB             2010

 

             Patient Education: Patient Medication Summary                      

     Completed    2010

 

                          Visit Plan:               Check fasting lab in AM--CMP

,Lipids, CBC, Vit D, TSH,FreeT4, B12

                                                            2010

 

                                        Appointment: Akanksha Driver

WPtel:[phone]

                                        [address] [facility]

                                              FOLLOW UP       2010

 

             Patient Education: Patient Medication Summary                      

     Completed    2010

 

                                        Referral: Landon De La Torre

WPtel:[phone]

#[address] [id number]

              Referral                        Appointment Requested  

 

                                        Referral: Landon De La Torre

WPtel:[phone]

#[address] [facility]

              Referral                        Initiated        







Instructions





                                        Comment

 

                                        . Supportive care.  Rest, Fluids, Tyleno

l/Motrin prn fever or bodyaches.  Notify

if worsening symptoms.



 

                                        . Add miralax daily

Use daily probiotic and metamucil and push fluids

Notify if worsens/persists

 

                                        . No NSAIDs

Kidney function discussed

Discussed hydration 

Monitor lab every 4months so will check CMP, HbA1C next month

 

                                        . Vestibular exercises

Refill flonase

Strict low Na diet--discussed that needs to read labels

Rx for walker with chair given due to muscle weakness/unsteadiness

Has carotids and abdominal aorta and legs checked in January

Check Chem 7



 

                                        . Check CMP, CBC, TSH, Free T4, B12, Lip

ids, HbA1C

Discussed 6 small meals a day each with protein

Would likely benefit from antidepressant 

Update colonoscopy

 

                                        . Cerumen flush with warm water and tyler

xide - good results 

Resume Nasonex nasal spray daily

Recommended Debrox earwax removal drops 

 

                                        . Continue aspirin daily

Add meloxicam for 1week

Elevate and Ice

Call on Monday

 

                                        . Been using baclofen at bedtime and has

 helped some

PT for next 2-4weeks



 

                                        . Continue exercise and current meds

See surgery for removal of right arm lesion

 

                                        . Discussed that likely lumbar etiology 

for leg weakness

Will change amlodopine to low dose dyazide and see if helps legs and inner ear

 

                                        . Ceftin and continue claritin/flonase

Decrease amlodopine to 2.5mg q HS until fwup with Card due to weakness

 

                                        .  Supportive care.  Rest, Fluids, Tylen

ol prn fever or bodyaches.  Notify if 

worsening symptoms.

Ceftin

 

                                        . Restart flonase BID

Use meclizine 25mg q HS

Vestibular exercises

Claritin 10mg q AM

See ENT if doesn't resolve

 

                                        . Will continue to observe



 

                                        . ERx for Augmentin 875mg q12 x 10 days 

and Floxin otic drops 5 gtts AD x7days

Sample of Nasonex per pt request

Discussed treatment (nasal saline, salt water gargles, keeping right ear clean 
and dry, for worsening go to UC on weekend, Tylenol/ibuprofen, etc.)

RTC 2 weeks for ear recheck.

 

                                        . Decrease caffeine intake

Check Bilateral Mammogram with US of left breast

 

                                        Left ear flushed with warm water and per

oxide with ear syringe and then last 

removed with currette--peroxide drops instilled.  Tolerated well, no 
complications, TM intact.. Continue off carafate and see how does

Continue probiotic

Add Vestibular exercises and use meclizine prn

Continue Nasonex

Check fasting lab including CMP, Lipids, CBC, TSH, Free T4, HbA1C

 

                                        . Continue carafate for 4more weeks at c

urrent dose then decrease to BID for 

2wks then q HS

 

                                        . Continue omeprazole

Add Carafate 1gm po q AC

Add daily probiotic



 

                                        . Diabetic Diet

Accuchecks BID alternating times

Hold onglyza and Januvia for now and continue diet and exercise and weight loss 
and moniter BS

Discussed hypoglycemia and snack of peanut butter and crackers with juice or 
milk

 

                                        Informed Consent obtainded.  Right SI yasir

int cleansed with alcohol and betadine 

and injected with 3cc 1%l lidocaine with 40mg depomedrol and 40mg kenalog, 
tolerated well with no complications, neosporin and bandage applied. Injection 
to Right SI joint as above

Pt will call in 3 days on pain

Has PT starting in 2wks.

 

                                        . OMT done

Start PT

May need updated MRI if need to consider epidural

Prednisone

 

                                        . Flexeril and Vimovo

OMT done

Daily stretches

 

                                        Right SI joint cleansed with alchohol an

d betadine and injected with 3cc 1% 

lidocaine with 40mg depomedrol and 40mg kenalog, tolerated well with no 
complications, neosporin and bandage applied. OMT done

Daily stretches

Moist heat or biofreeze

Right SI joint injection

Call in 1week

Vimovo BID

 

                                        . Decrease amlodopine to 1.25mg daily

Schedule with Cardiology

Fasting lab in AM

 

                                        .  Saline nasal flushes prn. Tylenol/Mot

rin prn headache.  Notify if 

persists/symptoms worsening.

Cerumen removal from ears as above



 

                                        . Continue vimovo for 1more week

Increase Omeprazole to BID for 1mo then resume QD if stomach improved

Vestibular exercises with meclizine q HS for next week

 

                                        . Saline nasal flushes prn. Tylenol/Motr

in prn headache.  Notify if 

persists/symptoms worsening.

Add Veramyst

Increase Omeprazole to 20mg po BID

 

                                        . Nasocourt sample given.  Pt. will noti

fy if symptoms are worse on Monday. 

 

                                        . May proceed with hiatal hernia repair 

per Card. so will contact Dr. Cash's office to proceed with surgery



Trial of Lexapro 5mg QD plus use xanax prn

 

                                        . B12 given

Cont oral B12 and iron

Fwup 1mo for B12

Proceed with hiatal hernia repair once card clearance

 

                                        . Check fasting lab in AM--CMP,Lipids, C

BC, Vit D, TSH,FreeT4, B12







Medical Equipment

No Medical Equipment data



Health Concerns Section

Health Concerns data not found



Goals Section

Goals data not found



Interventions Section

Interventions data not found



Health Status Evaluations/Outcomes Section

Health Status Evaluations/Outcomes data not found



Advance Directives

No Advance Directive data

## 2020-02-19 NOTE — XMS REPORT
CCD document using C-CDA

                             Created on: 2019



Leilani Ramírez

External Reference #: [id number]

: 1940

Sex: Female



Demographics





                          Address                   [address] [place]  [address]

 

                          Home Phone                [phone]

 

                          Preferred Language        English

 

                          Marital Status            

 

                          Catholic Affiliation     Unknown

 

                          Race                      White

 

                          Ethnic Group              Not  or 





Author





                          Author                    Leilani Driver D.O.

 

                          Organization              [facility]

 

                          Address                   [address] [place]  [address]



 

                          Phone                     [phone]







Care Team Providers





                    Care Team Member Name Role                Phone

 

                    Akanksha Driver D.O. PP                  Unavailable

 

                          CCM                       Unavailable



                                            



Summary Purpose

          Interface Exchange                                                    
               



Insurance Providers

                      



                    Payer name                   Policy type / Coverage type    

               

Covered party ID                   Effective Begin Date                   

Effective End Date                

 

                    [employer] [place]

                   

[id number]                   2018                   Unknown                

 

                    [employer]                   Medicare Part B 

                  

[id number]                   [id number]                   Unknown                



                                                                                
       



Family history

                      



Father            



                          Diagnosis                   Age At Onset              

  

 

                          Heart disease                   Unknown               

 



            



Mother            



                          Diagnosis                   Age At Onset              

  

 

                          Heart disease                   Unknown               

 

 

                          Cancer                    Unknown                



                                                                                
       



Social History

                      



                    Social History Element                   Codes              

     Description    

                                        Effective Dates                

 

                    Tobacco history                   SNOMED CT: [id number]      

             Never 

smoker                                  2011                

 

                    Marital status                   Unknown                   S

daniela               

                                        2010                

 

                    Number of children                   Unknown                

   9                

                                        2010                



                                                                                
                           



Allergies, Adverse Reactions, Alerts

                      



                Substance                   Reaction                   Codes    

               

Entered Date                   Inactivated Date                   Status        

       

 

                                                            * NO KNOWN FOOD STAR

RGIES                                   

                                    Unknown                   2010        

           No 

Inactive Date                           Active                

 

                                        _                                       

                  

Unknown                   2010                   No Inactive Date         

                                        Active                

 

                                        _                                       

                  

Unknown                   2019                   No Inactive Date         

                                        Active                

 

                                                            QUINIDINE-QUININE AN

ALOGUES                                 

                    hives,                    Unknown                   20

10               

                          No Inactive Date                   Active             

   



                                                                                
                           



Past Medical History

                      



                    Illness                   Codes                   Condition 

Status              

                          Onset Date                   Resolved Date            

    

 

                                                            Acute serous otitis 

media, left ear                         

                                        ICD-9: 381.01

ICD-10: H65.02                   Active                    2019           

 

                                        Unknown                

 

                                                            Coronary atheroscler

osis due to calcified coronary lesion   

                                        ICD-9: 414.00

ICD-10: I25.84                   Active                    2018           

 

                                        Unknown                

 

                                                            Essential (primary) 

hypertension                            

                                        ICD-9: 401.9

ICD-10: I10                   Active                    2014              

 

                                        Unknown                

 

                                                            Hypoglycemia, unspec

ified                                   

                                        ICD-9: 251.2

ICD-10: E16.2                   Active                    2017            

 

                                        Unknown                

 

                                              Other fatigue                     

                ICD-9: 

780.79

ICD-10: R53.83                   Active                    2017           

 

                                        Unknown                

 

                                                            Urinary tract infect

ion, site not specified                 

                                        ICD-9: 599.0

ICD-10: N39.0                   Active                    10/02/2018            

 

                                        Unknown                

 

                                                            Gastro-esophageal re

flux disease without esophagitis        

                                        ICD-9: 530.81

ICD-10: K21.9                   Active                    2018            

 

                                        Unknown                

 

                                              Epigastric pain                   

                  ICD-9: 

789.06

ICD-10: R10.13                   Active                    2018           

 

                                        Unknown                

 

                                                            Atherosclerotic hear

t disease of native coronary artery 

without angina pectoris                                     ICD-9: 414.00

ICD-10: I25.10                   Active                    2018           

 

                                        Unknown                

 

                                                            Generalized anxiety 

disorder                                

                                        ICD-9: 300.00

ICD-10: F41.1                   Active                    2018            

 

                                        Unknown                

 

                                              Palpitations                      

               ICD-9: 

785.1

ICD-10: R00.2                   Active                    10/02/2018            

 

                                        Unknown                

 

                                              Dizziness and giddiness           

                          

ICD-9: 780.4

ICD-10: R42                   Active                    2014              

 

                                        Unknown                

 

                                                            Occlusion and stenos

is of bilateral carotid arteries        

                                        ICD-9: 433.10

ICD-10: I65.23                   Active                    2018           

 

                                        Unknown                

 

                                              FLU VACCINE                       

              ICD-9: 

V04.81

ICD-10: Z23                   Active                    2012              

 

                                        Unknown                

 

                                              Cervicalgia                       

              ICD-9: 723.1

ICD-10: M54.2                   Active                    2018            

 

                                        Unknown                

 

                                                            Other spondylosis wi

th radiculopathy, cervical region       

                                        ICD-9: 721.0

ICD-10: M47.22                   Active                    2018           

 

                                        Unknown                

 

                                              Cervicocranial syndrome           

                          

ICD-9: 723.2

ICD-10: M53.0                   Active                    2018            

 

                                        Unknown                

 

                                                            Vertigo of central o

rigin, bilateral                        

                                        ICD-9: 386.2

ICD-10: H81.43                   Active                    2018           

 

                                        Unknown                

 

                                                            Benign paroxysmal ve

rtigo, bilateral                        

                                        ICD-9: 386.11

ICD-10: H81.13                   Active                    2018           

 

                                        Unknown                

 

                                              Otalgia, bilateral                

                     ICD-

9: 388.70

ICD-10: H92.03                   Active                    2018           

 

                                        Unknown                

 

                                                            Left lower quadrant 

pain                                    

                                        ICD-9: 789.04

ICD-10: R10.32                   Active                    2017           

 

                                        Unknown                

 

                                              Low back pain                     

                ICD-9: 

724.2

ICD-10: M54.5                   Active                    2017            

 

                                        Unknown                

 

                                                            Radiculopathy, lumbo

sacral region                           

                                        ICD-9: 724.4

ICD-10: M54.17                   Active                    2018           

 

                                        Unknown                

 

                                                            Impaired fasting glu

cose                                    

                                        ICD-9: 790.29

ICD-10: R73.01                   Active                    2012           

 

                                        Unknown                

 

                                              Mixed hyperlipidemia              

                       

ICD-9: 272.4

ICD-10: E78.2                   Active                    2017            

 

                                        Unknown                

 

                                                            Other intervertebral

 disc degeneration, lumbar region       

                                        ICD-9: 722.52

ICD-10: M51.36                   Active                    2017           

 

                                        Unknown                

 

                                              Pain in thoracic spine            

                         

ICD-9: 724.1

ICD-10: M54.6                   Active                    2017            

 

                                        Unknown                

 

                                              Mastodynia                        

             ICD-9: 611.71

ICD-10: N64.4                   Active                    2017            

 

                                        Unknown                

 

                                                            Acute upper respirat

ory infection, unspecified              

                                        ICD-9: 465.9

ICD-10: J06.9                   Active                    2017            

 

                                        Unknown                

 

                                                            Acute mastoiditis wi

thout complications, right ear          

                                        ICD-9: 383.00

ICD-10: H70.001                   Active                    2016          

 

                                        Unknown                

 

                                                            Constipation, unspec

ified                                   

                                        ICD-9: 564.00

ICD-10: K59.00                   Active                    2016           

 

                                        Unknown                

 

                                                            Chronic kidney disea

se, stage 1                             

                                        ICD-9: 585.1

ICD-10: N18.1                   Active                    03/15/2016            

 

                                        Unknown                

 

                                              History of falling                

                     ICD-

9: V15.88

ICD-10: Z91.81                   Active                    12/15/2015           

 

                                        Unknown                

 

                                                            Muscle weakness (gen

eralized)                               

                                        ICD-9: 780.79

ICD-10: M62.81                   Active                    2015           

 

                                        Unknown                

 

                                              Acute renal failure               

                      ICD-

9: 584.9                   Active                    2015                 

 

                                        Unknown                

 

                                              POLYURIA                          

           ICD-9: 788.42  

                    Active                   2015                   Unknow

n   

            

 

                                              CAD                               

      ICD-9: 414.00       

                    Active                   09/10/2015                   Unknow

n        

       

 

                                              Hyperglycemia                     

                ICD-9: 

790.29                   Active                   2012                   

Unknown                

 

                                              HYPERLIPIDEMIA NEC/NOS            

                         

ICD-9: 272.4                   Active                    09/10/2015             

 

                                        Unknown                

 

                                              ABDOMINAL PAIN                    

                 ICD-9: 

789.00                   Active                   10/10/2012                   

Unknown                

 

                                              Grieving                          

           ICD-9: 309.0   

                    Active                   2015                   Unknow

n    

           

 

                                              HYPOGLYCEMIA                      

               ICD-9: 

251.2                   Active                   2012                   

Unknown                

 

                                              MALAISE AND FATIGUE               

                      ICD-

9: 780.79                   Active                    2015                

 

                                        Unknown                

 

                                              CERUMEN IMPACTION                 

                    ICD-9:

380.4                   Active                   2015                   

Unknown                

 

                                              Leg pain                          

           ICD-9: 729.5   

                    Active                   2015                   Unknow

n    

           

 

                                              SUPERFIC PHLEBITIS-LEG            

                         

ICD-9: 451.0                   Active                    2015             

 

                                        Unknown                

 

                                              SPASM OF MUSCLE                   

                  ICD-9: 

728.85                   Active                   2015                   

Unknown                

 

                                              Thoracic back pain                

                     ICD-

9: 724.1                   Active                    2015                 

 

                                        Unknown                

 

                                                            Benign positional ve

rtigo                                   

                    ICD-9: 386.11                   Active                               

                                        Unknown                

 

                                              Suspicious nevus                  

                   ICD-9: 

238.2                   Active                   2015                   

Unknown                

 

                                                            EUSTACHIAN TUBE DYSF

UNCTION                                 

                    ICD-9: 381.81                   Active                             

                                        Unknown                

 

                                              SINUSITIS, ACUTE                  

                   ICD-9: 

461.9                   Active                   2014                   

Unknown                

 

                                              DIZZINESS/VERTIGO                 

                    ICD-9:

780.4                   Active                   2014                   

Unknown                

 

                                              HYPERTENSION                      

               ICD-9: 

401.9                   Active                   2014                   

Unknown                

 

                                              URI, ACUTE                        

             ICD-9: 465.9 

                    Active                   10/15/2013                   Unknow

n  

             

 

                                              CEPHALGIA                         

            ICD-9: 784.0  

                    Active                   2013                   Unknow

n   

            

 

                                              OTITIS MEDIA NOS                  

                   ICD-9: 

382.9                   Active                   2013                   

Unknown                

 

                                              Perforation of ear drum           

                          

ICD-9: 384.20                   Active                    05/10/2013            

 

                                        Unknown                

 

                                              Mastalgia                         

            ICD-9: 611.71 

                    Active                   2013                   Unknow

n  

             

 

                                              DYSPEPSIA                         

            ICD-9: 536.8  

                    Active                   10/10/2012                   Unknow

n   

            

 

                                              IBS                               

      ICD-9: 564.1        

                    Active                   10/10/2012                   Unknow

n         

      

 

                                              FLU VACCINE                       

              ICD-9: 

V04.81                   Active                   2012                   

Unknown                

 

                                              Radiculopathy of leg              

                       

ICD-9: 724.4                   Active                    2012             

 

                                        Unknown                

 

                                              SCIATICA                          

           ICD-9: 724.3   

                    Active                   2012                   Unknow

n    

           

 

                                                            Lumbar degenerative 

disc disease                            

                    ICD-9: 722.52                   Active                        

                                        Unknown                

 

                                              Sacroiliac dysfunction            

                         

ICD-9: 739.4                   Active                    2012             

 

                                        Unknown                

 

                                              Hypertension                      

               Unknown    

                    Active                   2012                   Unknow

n     

          

 

                                              PAIN, LOWER BACK                  

                   ICD-9: 

724.2                   Active                   2011                   

Unknown                

 

                                              SPINAL ENTHESOPATHY               

                      ICD-

9: 720.1                   Active                    2011                 

 

                                        Unknown                

 

                                              Hypotension                       

              ICD-9: 458.9

                    Active                   2011                   Unknow

n 

              

 

                                              SACROILIITIS NEC                  

                   ICD-9: 

720.2                   Active                   2011                   

Unknown                

 

                                              PHARYNGITIS, ACUTE                

                     ICD-

9: 462                   Active                   2010                   

Unknown                

 

                                              URINARY TRACT INFECTION           

                          

ICD-9: 599.0                   Active                    2010             

 

                                        Unknown                

 

                                              Heat exhaustion                   

                  ICD-9: 

992.5                   Active                   2010                   

Unknown                

 

                                              Esophagitis                       

              ICD-9: 

530.10                   Active                   2010                   

Unknown                

 

                                              Gastritis                         

            ICD-9: 535.50 

                    Active                   2010                   Unknow

n  

             

 

                                              GERD                              

       ICD-9: 530.81      

                    Active                   2010                   Unknow

n       

        

 

                                              Hiatal hernia                     

                ICD-9: 

553.3                   Active                   2010                   

Unknown                

 

                                              B12 DEFIC ANEMIA NEC              

                       

ICD-9: 281.1                   Active                    2010             

 

                                        Unknown                

 

                                              Anxiety                           

          Unknown         

                    Active                   2010                   Unknow

n          

     

 

                                              Heart disease                     

                Unknown   

                    Active                   2010                   Unknow

n    

           

 

                                              Hyperlipidemia                    

                 Unknown  

                    Active                   2010                   Unknow

n   

            

 

                                              ANXIETY STATE NOS                 

                    ICD-9:

300.00                   Active                   2010                   

Unknown                

 

                                              DEPRESSIVE DISORDER NEC           

                          

ICD-9: 311                   Active                    2010               

 

                                        Unknown                



                                                                                
                                                                                
                                                                                
                                                                                
                                                                                
                                                                                
                                                                                
                                                                                
                                                                                
                                                                                
                                                                                
                           



Problems

                      



                    Condition                   Codes                   Effectiv

e Dates             

                                        Condition Status                

 

                                                            Acute serous otitis 

media, left ear                         

                                        ICD-9: 381.01

ICD-10: H65.02                   2019                   Active            

   

 

                                                            Coronary atheroscler

osis due to calcified coronary lesion   

                                        ICD-9: 414.00

ICD-10: I25.84                   2018                   Active            

   

 

                                                            Essential (primary) 

hypertension                            

                                        ICD-9: 401.9

ICD-10: I10                   2014                   Active               



 

                                                            Hypoglycemia, unspec

ified                                   

                                        ICD-9: 251.2

ICD-10: E16.2                   2017                   Active             

  

 

                                              Other fatigue                     

                ICD-9: 

780.79

ICD-10: R53.83                   2017                   Active            

   

 

                                                            Urinary tract infect

ion, site not specified                 

                                        ICD-9: 599.0

ICD-10: N39.0                   10/02/2018                   Active             

  

 

                                                            Gastro-esophageal re

flux disease without esophagitis        

                                        ICD-9: 530.81

ICD-10: K21.9                   2018                   Active             

  

 

                                              Epigastric pain                   

                  ICD-9: 

789.06

ICD-10: R10.13                   2018                   Active            

   

 

                                                            Atherosclerotic hear

t disease of native coronary artery 

without angina pectoris                                     ICD-9: 414.00

ICD-10: I25.10                   2018                   Active            

   

 

                                                            Generalized anxiety 

disorder                                

                                        ICD-9: 300.00

ICD-10: F41.1                   2018                   Active             

  

 

                                              Palpitations                      

               ICD-9: 

785.1

ICD-10: R00.2                   10/02/2018                   Active             

  

 

                                              Dizziness and giddiness           

                          

ICD-9: 780.4

ICD-10: R42                   2014                   Active               



 

                                                            Occlusion and stenos

is of bilateral carotid arteries        

                                        ICD-9: 433.10

ICD-10: I65.23                   2018                   Active            

   

 

                                              FLU VACCINE                       

              ICD-9: 

V04.81

ICD-10: Z23                   2012                   Active               



 

                                              Cervicalgia                       

              ICD-9: 723.1

ICD-10: M54.2                   2018                   Active             

  

 

                                                            Other spondylosis wi

th radiculopathy, cervical region       

                                        ICD-9: 721.0

ICD-10: M47.22                   2018                   Active            

   

 

                                              Cervicocranial syndrome           

                          

ICD-9: 723.2

ICD-10: M53.0                   2018                   Active             

  

 

                                                            Vertigo of central o

rigin, bilateral                        

                                        ICD-9: 386.2

ICD-10: H81.43                   2018                   Active            

   

 

                                                            Benign paroxysmal ve

rtigo, bilateral                        

                                        ICD-9: 386.11

ICD-10: H81.13                   2018                   Active            

   

 

                                              Otalgia, bilateral                

                     ICD-

9: 388.70

ICD-10: H92.03                   2018                   Active            

   

 

                                                            Left lower quadrant 

pain                                    

                                        ICD-9: 789.04

ICD-10: R10.32                   2017                   Active            

   

 

                                              Low back pain                     

                ICD-9: 

724.2

ICD-10: M54.5                   2017                   Active             

  

 

                                                            Radiculopathy, lumbo

sacral region                           

                                        ICD-9: 724.4

ICD-10: M54.17                   2018                   Active            

   

 

                                                            Impaired fasting glu

cose                                    

                                        ICD-9: 790.29

ICD-10: R73.01                   2012                   Active            

   

 

                                              Mixed hyperlipidemia              

                       

ICD-9: 272.4

ICD-10: E78.2                   2017                   Active             

  

 

                                                            Other intervertebral

 disc degeneration, lumbar region       

                                        ICD-9: 722.52

ICD-10: M51.36                   2017                   Active            

   

 

                                              Pain in thoracic spine            

                         

ICD-9: 724.1

ICD-10: M54.6                   2017                   Active             

  

 

                                              Mastodynia                        

             ICD-9: 611.71

ICD-10: N64.4                   2017                   Active             

  

 

                                                            Acute upper respirat

ory infection, unspecified              

                                        ICD-9: 465.9

ICD-10: J06.9                   2017                   Active             

  

 

                                                            Acute mastoiditis wi

thout complications, right ear          

                                        ICD-9: 383.00

ICD-10: H70.001                   2016                   Active           

    

 

                                                            Constipation, unspec

ified                                   

                                        ICD-9: 564.00

ICD-10: K59.00                   2016                   Active            

   

 

                                                            Chronic kidney disea

se, stage 1                             

                                        ICD-9: 585.1

ICD-10: N18.1                   03/15/2016                   Active             

  

 

                                              History of falling                

                     ICD-

9: V15.88

ICD-10: Z91.81                   12/15/2015                   Active            

   

 

                                                            Muscle weakness (gen

eralized)                               

                                        ICD-9: 780.79

ICD-10: M62.81                   2015                   Active            

   

 

                                              Acute renal failure               

                      ICD-

9: 584.9                   2015                   Active                

 

                                              POLYURIA                          

           ICD-9: 788.42  

                          2015                   Active                

 

                                              CAD                               

      ICD-9: 414.00       

                          09/10/2015                   Active                

 

                                              Hyperglycemia                     

                ICD-9: 

790.29                    2012                   Active                

 

                                              HYPERLIPIDEMIA NEC/NOS            

                         

ICD-9: 272.4                   09/10/2015                   Active              

 

 

                                              ABDOMINAL PAIN                    

                 ICD-9: 

789.00                    10/10/2012                   Active                

 

                                              Grieving                          

           ICD-9: 309.0   

                          2015                   Active                

 

                                              HYPOGLYCEMIA                      

               ICD-9: 

251.2                     2012                   Active                

 

                                              MALAISE AND FATIGUE               

                      ICD-

9: 780.79                   2015                   Active                

 

                                              CERUMEN IMPACTION                 

                    ICD-9:

380.4                     2015                   Active                

 

                                              Leg pain                          

           ICD-9: 729.5   

                          2015                   Active                

 

                                              SUPERFIC PHLEBITIS-LEG            

                         

ICD-9: 451.0                   2015                   Active              

 

 

                                              SPASM OF MUSCLE                   

                  ICD-9: 

728.85                    2015                   Active                

 

                                              Thoracic back pain                

                     ICD-

9: 724.1                   2015                   Active                

 

                                                            Benign positional ve

rtigo                                   

                    ICD-9: 386.11                   2015                  

 Active            

   

 

                                              Suspicious nevus                  

                   ICD-9: 

238.2                     2015                   Active                

 

                                                            EUST[employer] TUBE DYSF

UNCTION                                 

                    ICD-9: 381.81                   2014                  

 Active          

     

 

                                              SINUSITIS, ACUTE                  

                   ICD-9: 

461.9                     2014                   Active                

 

                                              DIZZINESS/VERTIGO                 

                    ICD-9:

780.4                     2014                   Active                

 

                                              HYPERTENSION                      

               ICD-9: 

401.9                     2014                   Active                

 

                                              URI, ACUTE                        

             ICD-9: 465.9 

                          10/15/2013                   Active                

 

                                              CEPHALGIA                         

            ICD-9: 784.0  

                          2013                   Active                

 

                                              OTITIS MEDIA NOS                  

                   ICD-9: 

382.9                     2013                   Active                

 

                                              Perforation of ear drum           

                          

ICD-9: 384.20                   05/10/2013                   Active             

  

 

                                              Mastalgia                         

            ICD-9: 611.71 

                          2013                   Active                

 

                                              DYSPEPSIA                         

            ICD-9: 536.8  

                          10/10/2012                   Active                

 

                                              IBS                               

      ICD-9: 564.1        

                          10/10/2012                   Active                

 

                                              FLU VACCINE                       

              ICD-9: 

V04.81                    2012                   Active                

 

                                              Radiculopathy of leg              

                       

ICD-9: 724.4                   2012                   Active              

 

 

                                              SCIATICA                          

           ICD-9: 724.3   

                          2012                   Active                

 

                                                            Lumbar degenerative 

disc disease                            

                    ICD-9: 722.52                   2012                  

 Active     

          

 

                                              Sacroiliac dysfunction            

                         

ICD-9: 739.4                   2012                   Active              

 

 

                                              Hypertension                      

               Unknown    

                          2012                   Active                

 

                                              PAIN, LOWER BACK                  

                   ICD-9: 

724.2                     2011                   Active                

 

                                              SPINAL ENTHESOPATHY               

                      ICD-

9: 720.1                   2011                   Active                

 

                                              Hypotension                       

              ICD-9: 458.9

                          2011                   Active                

 

                                              SACROILIITIS NEC                  

                   ICD-9: 

720.2                     2011                   Active                

 

                                              PHARYNGITIS, ACUTE                

                     ICD-

9: 462                    2010                   Active                

 

                                              URINARY TRACT INFECTION           

                          

ICD-9: 599.0                   2010                   Active              

 

 

                                              Heat exhaustion                   

                  ICD-9: 

992.5                     2010                   Active                

 

                                              Esophagitis                       

              ICD-9: 

530.10                    2010                   Active                

 

                                              Gastritis                         

            ICD-9: 535.50 

                          2010                   Active                

 

                                              GERD                              

       ICD-9: 530.81      

                          2010                   Active                

 

                                              Hiatal hernia                     

                ICD-9: 

553.3                     2010                   Active                

 

                                              B12 DEFIC ANEMIA NEC              

                       

ICD-9: 281.1                   2010                   Active              

 

 

                                              Anxiety                           

          Unknown         

                          2010                   Active                

 

                                              Heart disease                     

                Unknown   

                          2010                   Active                

 

                                              Hyperlipidemia                    

                 Unknown  

                          2010                   Active                

 

                                              ANXIETY STATE NOS                 

                    ICD-9:

300.00                    2010                   Active                

 

                                              DEPRESSIVE DISORDER NEC           

                          

ICD-9: 311                   2010                   Active                



                                                                                
                                                                                
                                                                                
                                                                                
                                                                                
                                                                                
                                                                                
                                                                                
                                                                                
                                                                                
                                                                                
                           



Medications

                      



                    Medication                   Codes                   Instruc

tions               

                    Start Date                   Stop Date                   Sta

tus              

                                        Fill Instructions                

 

                                                            Flonase Allergy Reli

ef 50 mcg/actuation nasal 

spray,suspension                                     RxNorm: 4870600            

                    2 [place] [facility]                   2019              

     No Stop 

Date                      Active                                    

 

                                                            simvastatin 40 mg ta

blet                                    

                    RxNorm: 819387                   1 Tablet(s) PO QD          

         2019                   Active                         

   

      

 

                                                            simvastatin 40 mg ta

blet                                    

                    RxNorm: 209134                   1 Tablet(s) PO QD          

         2019                   Inactive                       

   

        

 

                                                            omeprazole 40 mg cap

alicia,delayed release                    

                          RxNorm: 701359                   1 Capsule(s) PO QD   

          

                    2019                   Ac

tive           

                                                        

 

                                                            simvastatin 40 mg ta

blet                                    

                    RxNorm: 350870                   1 Tablet(s) PO QD          

         2019                   Inactive                       

   

        

 

                                                            omeprazole 40 mg cap

alicia,delayed release                    

                          RxNorm: 561170                   1 Capsule(s) PO QD   

          

                    2019                   In

active         

                                                        

 

                                                            Bactrim  mg-16

0 mg tablet                             

                    RxNorm: 394294                   1 Tablet(s) PO BID         

          

2019                   Inactive              

                                                        

 

                                                            Bactrim  mg-16

0 mg tablet                             

                    RxNorm: 310679                   1 Tablet(s) PO BID         

          

2019                   Inactive              

                                                        

 

                                                            metoprolol tartrate 

25 mg tablet                            

                    RxNorm: 659736                   1 Tablet(s) PO BID         

          

2019                   Inactive              

                                                        

 

                                              Macrobid 100 mg capsule           

                          

RxNorm: 167348                   1 Capsule(s) PO BID                   

2019                   Inactive              

                                                        

 

                                              Xanax 0.25 mg tablet              

                       

RxNorm: 883566                          TAKE 1 TABLET BY MOUTH TWICE DAILY      

       

                    2018                   No Stop Date                   

Active         

                                                        

 

                                              Xanax 0.25 mg tablet              

                       

RxNorm: 232545                   1 Tablet(s) PO BID                   2018                   Inactive                       

  

         

 

                                                            Protonix 40 mg table

t,delayed release                       

                          RxNorm: 557986                   1 Tablet(s) PO BID fo

r 

stomach--replaces omeprazole                   2018                   Inactive                                   

 

                                              Carafate 1 gram tablet            

                         

RxNorm: 556836                   1 Tablet(s) PO AC & HS                   

2018                   Inactive              

                                                        

 

                                              Xanax 0.25 mg tablet              

                       

RxNorm: 633114                   1 Tablet(s) PO BID                   10/30/2018

                    12/10/2018                   Inactive                       

  

         

 

                                                            Bactrim  mg-16

0 mg tablet                             

                    RxNorm: 417758                   1 Tablet(s) PO BID         

          

10/04/2018                   10/08/2018                   Inactive              

                                                        

 

                                                            Bactrim  mg-16

0 mg tablet                             

                    RxNorm: 405779                   1 Tablet(s) PO BID         

          

10/04/2018                   10/03/2018                   Inactive              

                                                        

 

                                              Carafate 1 gram tablet            

                         

RxNorm: 111433                   1 Tablet(s) PO AC & HS                   

2018                   10/17/2018                   Inactive              

                                                        

 

                                                            Protonix 40 mg table

t,delayed release                       

                          RxNorm: 722847                   1 Tablet(s) PO BID fo

r 

stomach--replaces omeprazole                   2018                   Inactive                                   

 

                                                            Protonix 40 mg table

t,delayed release                       

                          RxNorm: 326551                   1 Tablet(s) PO BID fo

r 

stomach--replaces omeprazole                   2018                   Inactive                                   

 

                                                            fluticasone 50 mcg/a

ctuation nasal spray,suspension         

                           RxNorm: 3320704                   2 Spray NASAL QD to

each nostril                   2018          

                          Inactive                                   

 

                                                            Nasonex 50 mcg/actua

tion Spray                              

                    RxNorm: 0331054                   2 Spray NASAL             

      

2018                   Inactive              

                                                        

 

                                                            omeprazole 40 mg cap

alicia,delayed release                    

                          RxNorm: 073520                   1 Capsule(s) PO QD   

          

                    2018                   08/15/2018                   In

active         

                                                        

 

                                                            simvastatin 40 mg ta

blet                                    

                          RxNorm: 229890                   1 Tablet(s) PO QD ROBLES

E 1 TABLET EVERY DAY      

                    2018                   In

active  

                                                        

 

                                                            metoprolol tartrate 

25 mg tablet                            

                    RxNorm: 847937                   1/2 Tablet(s) PO QD        

           

2018                   Inactive              

                                                        

 

                                                            simvastatin 40 mg ta

blet                                    

                          RxNorm: 986517                   Tablet(s) TAKE 1 TABL

ET EVERY DAY              

                    2017                   In

active          

                                                        

 

                                                            metoprolol tartrate 

25 mg tablet                            

                    RxNorm: 741571                   1/2 Tablet(s) PO QD        

           

2017                   Inactive              

                                                        

 

                                                            Flonase 50 mcg/actua

tion nasal spray,suspension             

                          RxNorm: 2052280                   2 Spray NASAL BID   

   

                    2017                   In

active  

                                                        

 

                                                            omeprazole 40 mg cap

alicia,delayed release                    

                          RxNorm: 449870                   1 Capsule(s) PO QD   

          

                    2017                   In

active         

                                                        

 

                                                            metoprolol tartrate 

25 mg tablet                            

                    RxNorm: 311431                   1/2 Tablet(s) PO QD        

           

04/10/2017                   2017                   Inactive              

                                                        

 

                                                            ciprofloxacin 0.2 % 

ear drops in a dropperette              

                          RxNorm: 363550                   4 Drop(s) OTIC TID fo

r 1 

week                      2016               

   

                          Inactive                                   

 

                                              cefdinir 300 mg capsule           

                          

RxNorm: 061141                   2 Capsule(s) PO QD                   2016                   Inactive                       

  

         

 

                                                            omeprazole 40 mg cap

alicia,delayed release                    

                          RxNorm: 090808                   TAKE 1 CAPSULE EVERY 

DAY       

                    2016                   In

active   

                                                        

 

                                                            simvastatin 40 mg ta

blet                                    

                    RxNorm: 020708                   TAKE 1 TABLET EVERY DAY    

               

2016                   Inactive              

                                                        

 

                                                            Flonase 50 mcg/actua

tion nasal spray,suspension             

                          RxNorm: 8590707                   2 Spray NASAL BID   

   

                    2015                   In

active  

                                                        

 

                                                            cefuroxime axetil 25

0 mg tablet                             

                    RxNorm: 792607                   1 Tablet(s) PO BID         

          

2015                   Inactive              

                                                        

 

                                                            cefuroxime axetil 25

0 mg tablet                             

                    RxNorm: 469885                   1 Tablet(s) PO BID         

          

2015                   Inactive              

                                                        

 

                                                            omeprazole 40 mg cap

alicia,delayed release                    

                          RxNorm: 098840                   1 Capsule(s) PO QD   

          

                    2015                   In

active         

                                                        

 

                                              meloxicam 7.5 mg tablet           

                          

RxNorm: 171323                   1 Tablet(s) PO QD                   2015                   Inactive                       

   

        

 

                                              loratadine 10 mg tablet           

                          

RxNorm: 159808                          1 Tablet(s) PO QAM for allergies        

       

                    2014                   In

active           

                                                        

 

                                                            Flonase 50 mcg/actua

tion nasal spray,suspension             

                          RxNorm: 290640                   2 Spray NASAL BID    

   

                    2014                   12/15/2015                   In

active   

                                                        

 

                                              prednisone 20 mg tablet           

                          

RxNorm: 820660                   2 Tablet(s) PO BID                   2014                   Inactive                       

  

         

 

                                                            Dyazide 37.5 mg-25 m

g capsule                               

                    RxNorm: 383435                   1 Capsule(s) PO QAM        

           

2014                   Inactive              

                                                        

 

                                              Ceftin 500 mg tablet              

                       

RxNorm: 524976                   1 Tablet(s) PO BID                   2014                   Inactive                       

  

         

 

                                              Ceftin 500 mg tablet              

                       

RxNorm: 540671                   1 Tablet(s) PO BID                   2014                   Inactive                       

  

         

 

                                                            simvastatin 40 mg ta

blet                                    

                          RxNorm: 407707                   Tablet(s) PO TAKE ONE

 TABLET BY MOUTH EVERY DAY

                    2014                   

Inactive                                                

 

                                                            metoprolol tartrate 

25 mg tablet                            

                          RxNorm: 479502                   Tablet(s) PO TAKE ONE

-HALF TABLET BY 

MOUTH EVERY DAY                   2014       

                          Inactive                                   

 

                                              Ceftin 500 mg tablet              

                       

RxNorm: 102996                   1 Tablet(s) PO BID                   10/15/2013

                    10/24/2013                   Inactive                       

  

         

 

                                              loratadine 10 mg tablet           

                          

RxNorm: 444862                          1 Tablet(s) PO QAM for allergies        

       

                    2013                   In

active           

                                                        

 

                                                            omeprazole 20 mg cap

alicia,delayed release                    

                          RxNorm: 422403                   Capsule(s) PO TAKE ON

E CAPSULE 

BY MOUTH TWICE DAILY                   2013  

                          Inactive                                   

 

                                                            omeprazole 20 mg cap

alicia,delayed release                    

                          RxNorm: 725797                   1 Capsule(s) PO BID  

          

                    2013                   In

active        

                                                        

 

                                                            Augmentin 875 mg-125

 mg tablet                              

                    RxNorm: 119853                   1 Tablet(s) PO Q12H        

           

05/10/2013                   2013                   Inactive              

                                                        

 

                                                            Floxin Otic Drops 1 

bottle Drops                            

                    RxNorm:                    5 Drop(s) OTIC BID               

    

05/10/2013                   2013                   Inactive              

                                                        

 

                                                            metoprolol tartrate 

25 mg tablet                            

                          RxNorm: 975482                   Tablet(s) PO TAKE ONE

-HALF TABLET BY 

MOUTH EVERY DAY                   2013       

                          Inactive                                   

 

                                                            simvastatin 40 mg ta

blet                                    

                          RxNorm: 987824                   Tablet(s) PO TAKE ONE

 TABLET BY MOUTH EVERY DAY

                    2013                   

Inactive                                                

 

                                              lancets                           

          RxNorm:         

                                        Misc Miscellaneous USE ONE TO CHECK GLUC

OSE EVERY DAY                 

                    2013                   In

active             

                                                        

 

                                              Carafate 1 gram tablet            

                         

RxNorm: 620078                   1 Tablet(s) PO AC & HS                   

2012                   Inactive              

                                                        

 

                                              Flagyl 500 mg tablet              

                       

RxNorm: 080293                   1 Tablet(s) PO TID                   10/10/2012

                    10/16/2012                   Inactive                       

  

         

 

                                              Carafate 1 gram tablet            

                         

RxNorm: 743345                   1 Tablet(s) PO AC & HS                   

10/10/2012                   2012                   Inactive              

                                                        

 

                                              One Touch Test strips             

                        

RxNorm:                    Miscellaneous QD                   2012                   Inactive                   one 

touch 

ultra mini test strips                

 

                                              Protonix 40 mg Tab                

                     

RxNorm: 184644                   1 Tablet(s) PO QD                   2012                   Inactive                       

   

        

 

                                              Protonix 40 mg Tab                

                     

RxNorm: 886011                   1 Tablet(s) PO QD                   2012 

                    10/09/2012                   Inactive                       

   

        

 

                                              prednisone 20 mg Tab              

                       

RxNorm: 432689                   1 Tablet(s) PO BID                   2012                   Inactive                       

  

         

 

                                              Flexeril 5 mg Tab                 

                    

RxNorm: 784271                   1 Tablet(s) PO QHS for spasm                   

2012                   Inactive              

                                                        

 

                                              Flexeril 5 mg Tab                 

                    

RxNorm: 021734                   1 Tablet(s) PO QHS for spasm                   

2012                   Inactive              

                                                        

 

                                              amlodipine 2.5 mg Tab             

                        

RxNorm: 722285                          1/2 Tablet(s) PO QD replaces 5mg dose   

       

                    2012                   In

active      

                                                        

 

                                                            simvastatin 40 mg ta

blet                                    

                    RxNorm: 516722                   1 Tablet(s) PO QD          

         2012                   Inactive                       

   

        

 

                                                            metoprolol tartrate 

25 mg tablet                            

                    RxNorm: 398650                   1/2 Tablet(s) PO QD        

           

2012                   Inactive              

                                                        

 

                                                            omeprazole 20 mg cap

alicia,delayed release                    

                          RxNorm: 482421                   1 Capsule(s) PO BID  

          

                    2012                   In

active        

                                                        

 

                                              cefdinir 300 mg Cap               

                      

RxNorm: 834677                   2 Capsule(s) PO QD                   2011                   Inactive                       

  

         

 

                                              simvastatin 40 mg Tab             

                        

RxNorm: 057089                   1 Tablet(s) PO QD                   2011                   Inactive                       

   

        

 

                                                            metoprolol tartrate 

25 mg Tab                               

                    RxNorm: 383906                   1/2 Tablet(s) PO QD        

           

10/25/2011                   2012                   Inactive              

                                                        

 

                                                            metoprolol tartrate 

25 mg Tab                               

                    RxNorm: 982972                   1/2 Tablet(s) PO QD        

           

10/24/2011                   10/24/2011                   Inactive              

                                                        

 

                                              meclizine 25 mg Tab               

                      

RxNorm: 1108165                   1 Tablet(s) PO QHS                   

2011                   Inactive              

                                        for dizziness                

 

                                              Ceftin 500 mg Tab                 

                    

RxNorm: 189837                   1 Tablet(s) PO BID                   2011                   Inactive                       

  

         

 

                                                            omeprazole 20 mg Cap

, Delayed Release                       

                          RxNorm: 938395                   1 Capsule(s) PO BID  

             

                    2011                   10/24/2011                   In

active           

                                                        

 

                                              simvastatin 40 mg Tab             

                        

RxNorm: 216333                   1 Tablet(s) PO QD                   2011                   Inactive                       

   

        

 

                                              simvastatin 40 mg Tab             

                        

RxNorm: 662839                   1 Tablet(s) PO QD                   2011                   Inactive                       

   

        

 

                                              simvastatin 40 mg Tab             

                        

RxNorm: 174999                   1 Tablet(s) PO QD                   2010                   Inactive                       

   

        

 

                                                            Tessalon Perles 100 

mg Cap                                  

                    RxNorm: 202549                   1 Capsule(s) PO Q6-8H      

             

2010                   Inactive              

                                                        

 

                                              cefdinir 300 mg Cap               

                      

RxNorm: 727762                   1 Capsule(s) PO BID                   

2010                   Inactive              

                                                        

 

                                              Lexapro 10 mg Tab                 

                    

RxNorm: 485813                   1 Tablet(s) PO QD                   2010                   Inactive                       

   

        

 

                                              Cipro 250 mg Tab                  

                   RxNorm:

696140                    1 Tablet(s) PO BID                   2010       

 

                    10/04/2010                   Inactive                       

          

 

 

                                                            Wellbutrin  mg

 24 hr Tab                              

                    RxNorm: 419433                   1 Tablet(s) PO QAM         

          

2010                   Inactive              

                                                        

 

                                              Fish Oil 1,000 mg Cap             

                        

RxNorm:                    1 Capsule(s) PO QD                   No Start Date   

                                        Active                                  

 

 

                                                            Tylenol Extra Streng

th 500 mg tablet                        

                          RxNorm: 879909                   1/2 Tablet(s) PO as n

eeded         

                    No Start Date                                       Active  

           

                                                        

 

                                                            nitroglycerin 0.4 mg

 sublingual tablet                      

                          RxNorm: 085051                   Tablet(s) SL as neede

d           

                    No Start Date                                       Active  

             

                                                        

 

                                                            Calcium with Vitamin

 D 600 mg (1,500 mg)-400 unit tablet    

                                RxNorm: 213751                   1 Tablet(s) PO 

QD                   No Start Date                                       Active 

                                                        

 

                                                            Multivitamin & Devola

al Formula Tab                          

                    RxNorm:                    1 Tablet(s) PO QD                

   No 

Start Date                                       Active                         

         

 

                                                            vitamin B complex ca

psule                                   

                    RxNorm:                    1 Capsule(s) PO QD               

    No Start Date  

                                        Active                                  

 

 

                                              Aspirin 81 mg Tab                 

                    

RxNorm: 104158                   1 Tablet(s) PO QD                   No Start 

Date                                       Active                               

   

 

                                                            isosorbide mononitra

te ER 30 mg tablet,extended release 24 

hr                                     RxNorm: 493986                   1 

Tablet(s) PO QHS                   No Start Date                                

                          Active                                    

 

                                                            Vitamin D 1,000 unit

 Cap                                    

                    RxNorm: 562560                   1 Capsule(s) PO QD         

          No Start 

Date                                       Active                               

   

 

                                              Co Q-10 oral                      

               RxNorm: 

29311                   oral                   No Start Date                    

                          Active                                    

 

                                                            metoprolol tartrate 

25 mg Tab                               

                    RxNorm: 668899                   1/2 Tablet(s) PO QD        

           No 

Start Date                   10/23/2011                   Inactive              

                                                        

 

                                                            Nasonex 50 mcg/actua

tion Spray                              

                    RxNorm: 2976234                   2 Spray NASAL             

      No Start

Date                   2018                   Inactive                    

              

 

                                                            Vitamin B12 1000mcg 

Tablet                                  

                    RxNorm:                    1 Tablet(s) PO QD                

   No Start Date  

                    2015                   Inactive                       

    

       

 

                                              amlodipine 5 mg Tab               

                      

RxNorm: 507200                   1 Tablet(s) PO QD                   No Start 

Date                   2012                   Inactive                    

              

 

                                                            simvastatin 40 mg ta

blet                                    

                    RxNorm: 916545                   1 Tablet(s) PO QD          

         No Start 

Date                   2019                   Inactive                    

              

 

                                                            omeprazole 20 mg cap

alicia,delayed release                    

                          RxNorm: 856391                   1 Capsule(s) PO BID  

          

                    No Start Date                   2013                  

 Inactive     

                                                        

 

                                                            omeprazole 40 mg cap

alicia,delayed release                    

                          RxNorm: 802547                   1 Capsule(s) PO QD   

          

                    No Start Date                   2019                  

 Inactive      

                                                        

 

                                              Nexium 40 mg Cap                  

                   RxNorm:

488776                    1 Capsule(s) PO QD                   No Start Date    

 

                    2010                   Inactive                       

       

    

 

                                                            Stool Softener 100 m

g Tab                                   

                    RxNorm: 9122707                   2 Tablet(s) PO BID        

           No Start

Date                   2012                   Inactive                    

              

 

                                                            Calcium with Vitamin

 D 600 mg (1,500 mg)-400 unit Tab       

                             RxNorm: 105318                   1 Tablet(s) PO QD 

                    No Start Date                   2015                  

 

Inactive                                                

 

                                                            iron 325 mg (65 mg i

naun) Tab                                

                    RxNorm: 421680                   1 Tablet(s) PO QD          

         No 

Start Date                   2015                   Inactive              

                                                        

 

                                                            Flonase 50 mcg/actua

tion Nasal Spray                        

                          RxNorm: 107323                   2 Spray NASAL BID    

              

                    No Start Date                   2014                  

 Inactive           

                                                        

 

                                                            fluticasone 50 mcg/a

ctuation nasal spray,suspension         

                           RxNorm: 6471772                   2 Spray NASAL QD to

each nostril                   No Start Date                   2018       

                          Inactive                                   

 

                                                            Nasonex 50 mcg/actua

tion Spray                              

                    RxNorm: 2456801                   2 Spray NASAL QD          

         No 

Start Date                   2018                   Inactive              

                                                        

 

                                                            hydralazine 50 mg ta

blet                                    

                          RxNorm: 159167                   1 Tablet(s) PO as nee

ded for BP over 160/90    

                    No Start Date                   2018                  

 

Inactive                                                

 

                                                            Vimovo 500 mg-20 mg 

12 hr Tab                               

                    RxNorm: 625399                   1 Tablet(s) PO BID         

          No 

Start Date                   2011                   Inactive              

                                                        

 

                                                            Tylenol PM 25 mg-500

 mg/15 mL Oral Soln                     

                    RxNorm: 3462824                   1 PO QPM                  

 No 

Start Date                   2015                   Inactive              

                                                        

 

                                                            Iron (Ferrous Sulfat

e) Oral                                 

                    RxNorm:                    Oral                   No Start D

ate              

                    2012                   Inactive                       

            

 

                                              Reglan 10 mg Tab                  

                   RxNorm:

357900                    1 Tablet(s) PO TID before meals                   No 

Start Date                   2011                   Inactive              

                                                        

 

                                              Xanax 1 mg Tab                    

                 RxNorm: 

446807                    1/2 Tablet(s) PO QD                   No Start Date   

 

                    2018                   Inactive                       

      

     

 

                                              amlodipine 10 mg tablet           

                          

RxNorm: 032741                   1 Tablet(s) PO QD                   No Start 

Date                   2014                   Inactive                    

              

 

                                              Iron (dried) Oral                 

                    

RxNorm:                    Oral                      No Start Date              

   

                    2012                   Inactive                       

            

 

                                                            metoprolol tartrate 

50 mg tablet                            

                    RxNorm: 651813                   1 Tablet(s) PO BID         

          No

Start Date                   12/10/2018                   Inactive              

                                                        

 

                                              Xanax 0.25 mg tablet              

                       

RxNorm: 409312                   1 Tablet(s) PO BID                   No Start 

Date                   10/29/2018                   Inactive                    

              

 

                                              lancets                           

          RxNorm:         

                          Miscellaneous check blood sugar at least once daily   

                

No Start Date                   2013                   Inactive           

                                                        

 

                                              simvastatin 40 mg Tab             

                        

RxNorm: 044968                   1 Tablet(s) PO QD                   No Start 

Date                   2010                   Inactive                    

              

 

                                                            isosorbide mononitra

te ER 30 mg tablet,extended release 24 

hr                                     RxNorm: 554845                   1 

Tablet(s) PO QHS                   No Start Date                   2019   

                          Inactive                                   

 

                                                            amlodipine 2.5 mg ta

blet                                    

                    RxNorm: 837057                   1 Tablet(s) PO QD          

         No Start 

Date                   2014                   Inactive                    

              

 

                                                            sucralfate 1 gram ta

blet                                    

                    RxNorm: 678379                   1 Tablet(s) PO QID         

          No Start 

Date                   2019                   Inactive                    

              

 

                                              Fish Oil Oral                     

                RxNorm:   

                    Oral                   No Start Date                   

2012                   Inactive                                   

 

                                              Multiple Vitamin Oral             

                        

RxNorm:                    Oral                      No Start Date              

   

                    2012                   Inactive                       

            

 

                                                            metoprolol tartrate 

25 mg tablet                            

                    RxNorm: 803178                   1/2 Tablet(s) PO QD        

           

No Start Date                   2017                   Inactive           

                                                        

 

                                                            metoprolol tartrate 

50 mg tablet                            

                    RxNorm: 383415                   1/2 Tablet(s) PO BID       

            

No Start Date                   2019                   Inactive           

                                                        

 

                                                            Flonase Allergy Reli

ef 50 mcg/actuation nasal 

spray,suspension                                     RxNorm: 1993919            

                    2 [place] NASAL QHS                   No Start Date           

        

2019                   Inactive                                   



                                                                                
                                                                                
                                                                                
                                                                                
                                                                                
                                                                                
                                                                                
                                                                                
                                                                                
                                                                                
                                                                                
                                                                                
                                                                                
                                                                                
                                                                                
                                                                                
                                                    



Medication Administered

          No Medication Administered data                                       
                            



Immunizations

                      



                Vaccine                   Codes                   Date          

         Status 

              

 

                    Influenza                   CVX: 135                                  

                                        completed                

 

                    Influenza                   CVX: 135                   10/06

/2017               

                                        completed                

 

                    Influenza                   CVX: 135                   10/07

/2016               

                                        completed                

 

                    Influenza                   CVX: 135                   10/16

/2015               

                                        completed                

 

                    Influenza                   CVX: 141                                  

                                        completed                

 

                    Influenza (Adult)                   CVX: 141                

   10/06/2010       

                                        completed                



                                                                                
                                               



Assessments

                      



                    Condition                   Codes                   Effectiv

e Dates             

  

 

                          Acute serous otitis media, left ear                   

ICD-10: H65.02

ICD-9: 381.01                           2019                

 

                          Urinary tract infection, site not specified           

        ICD-10: N39.0

ICD-9: 599.0                            06/10/2019                

 

                          Other fatigue                   ICD-10: R53.83

ICD-9: 780.79                           06/10/2019                

 

                          Hypoglycemia, unspecified                   ICD-10: E1

6.2

ICD-9: 251.2                            06/10/2019                

 

                          Coronary atherosclerosis due to calcified coronary les

ion                   ICD-

10: I25.84

ICD-9: 414.00                           06/10/2019                

 

                          Essential (primary) hypertension                   ICD

-10: I10

ICD-9: 401.9                            06/10/2019                

 

                          Gastro-esophageal reflux disease without esophagitis  

                 ICD-10: 

K21.9

ICD-9: 530.81                           2019                

 

                          Epigastric pain                   ICD-10: R10.13

ICD-9: 789.06                           2018                

 

                          Generalized anxiety disorder                   ICD-10:

 F41.1

ICD-9: 300.00                           2018                

 

                                        Atherosclerotic heart disease of native 

coronary artery without angina pectoris 

                                        ICD-10: I25.10

ICD-9: 414.00                           2018                

 

                          Palpitations                   ICD-10: R00.2

ICD-9: 785.1                            10/02/2018                

 

                          Occlusion and stenosis of bilateral carotid arteries  

                 ICD-10: 

I65.23

ICD-9: 433.10                           2018                

 

                          Dizziness and giddiness                   ICD-10: R42

ICD-9: 780.4                            2018                

 

                          FLU VACCINE                   ICD-10: Z23

ICD-9: V04.81                           2018                

 

                          Cervicalgia                   ICD-10: M54.2

ICD-9: 723.1                            2018                

 

                          Other spondylosis with radiculopathy, cervical region 

                  ICD-10: 

M47.22

ICD-9: 721.0                            2018                

 

                          Cervicocranial syndrome                   ICD-10: M53.

0

ICD-9: 723.2                            2018                

 

                          Vertigo of central origin, bilateral                  

 ICD-10: H81.43

ICD-9: 386.2                            2018                

 

                          Otalgia, bilateral                   ICD-10: H92.03

ICD-9: 388.70                           2018                

 

                          Benign paroxysmal vertigo, bilateral                  

 ICD-10: H81.13

ICD-9: 386.11                           2018                

 

                          Radiculopathy, lumbosacral region                   IC

D-10: M54.17

ICD-9: 724.4                            2018                

 

                          Low back pain                   ICD-10: M54.5

ICD-9: 724.2                            2018                

 

                          Left lower quadrant pain                   ICD-10: R10

.32

ICD-9: 789.04                           2018                

 

                          Impaired fasting glucose                   ICD-10: R73

.01

ICD-9: 790.29                           2017                

 

                          Mixed hyperlipidemia                   ICD-10: E78.2

ICD-9: 272.4                            2017                

 

                          Other intervertebral disc degeneration, lumbar region 

                  ICD-10: 

M51.36

ICD-9: 722.52                           2017                

 

                          Pain in thoracic spine                   ICD-10: M54.6

ICD-9: 724.1                            2017                

 

                          Mastodynia                   ICD-10: N64.4

ICD-9: 611.71                           2017                

 

                          Acute upper respiratory infection, unspecified        

           ICD-10: J06.9

ICD-9: 465.9                            2017                

 

                          Acute mastoiditis without complications, right ear    

               ICD-10: 

H70.001

ICD-9: 383.00                           2016                

 

                          Constipation, unspecified                   ICD-10: K5

9.00

ICD-9: 564.00                           2016                

 

                          Chronic kidney disease, stage 1                   ICD-

10: N18.1

ICD-9: 585.1                            2016                

 

                          Muscle weakness (generalized)                   ICD-10

: M62.81

ICD-9: 780.79                           2015                

 

                          History of falling                   ICD-10: Z91.81

ICD-9: V15.88                           2015                

 

                    POLYURIA                   ICD-9: 788.42                   0

9/15/2015           

    

 

                    Acute renal failure                   ICD-9: 584.9          

         09/15/2015 

              

 

                    HYPERTENSION                   ICD-9: 401.9                 

  09/10/2015        

       

 

                    Hyperglycemia                   ICD-9: 790.29               

    09/10/2015      

         

 

                    CAD                   ICD-9: 414.00                   09/10/

2015                

 

                    HYPERLIPIDEMIA NEC/NOS                   ICD-9: 272.4       

            

09/10/2015                

 

                    MALAISE AND FATIGUE                   ICD-9: 780.79         

          09/10/2015

               

 

                    HYPOGLYCEMIA                   ICD-9: 251.2                 

  2015        

       

 

                    Grieving                   ICD-9: 309.0                               

   

 

                    ABDOMINAL PAIN                   ICD-9: 789.00              

     2015     

          

 

                    CERUMEN IMPACTION                   ICD-9: 380.4            

       2015   

            

 

                    EUSTACHIAN TUBE DYSFUNCTION                   ICD-9: 381.81 

                  

2015                

 

                    SUPERFIC PHLEBITIS-LEG                   ICD-9: 451.0       

            

2015                

 

                    Leg pain                   ICD-9: 729.5                               

   

 

                    Thoracic back pain                   ICD-9: 724.1           

        2015  

             

 

                    SPASM OF MUSCLE                   ICD-9: 728.85             

      2015    

           

 

                    DIZZINESS/VERTIGO                   ICD-9: 780.4            

       2015   

            

 

                    Suspicious nevus                   ICD-9: 238.2             

      2015    

           

 

                    Benign positional vertigo                   ICD-9: 386.11   

                

2015                

 

                    SINUSITIS, ACUTE                   ICD-9: 461.9             

      2014    

           

 

                    B12 DEFIC ANEMIA NEC                   ICD-9: 281.1         

          2014

               

 

                    COUGH                   ICD-9: 786.2                                  



 

                    URI, ACUTE                   ICD-9: 465.9                   

10/15/2013          

     

 

                    ANXIETY STATE NOS                   ICD-9: 300.00           

        2013  

             

 

                    FLU VACCINE                   ICD-9: V04.81                 

  2013        

       

 

                    CEPHALGIA                   ICD-9: 784.0                   0

2013           

    

 

                    OTITIS MEDIA NOS                   ICD-9: 382.9             

      2013    

           

 

                    Perforation of ear drum                   ICD-9: 384.20     

              

05/10/2013                

 

                    Mastalgia                   ICD-9: 611.71                   

2013          

     

 

                    GERD                   ICD-9: 530.81                                  



 

                    DYSPEPSIA                   ICD-9: 536.8                   1

           

    

 

                    IBS                   ICD-9: 564.1                   10/10/2

012                

 

                    URINARY TRACT INFECTION                   ICD-9: 599.0      

             

2012                

 

                    SPINAL ENTHESOPATHY                   ICD-9: 720.1          

         2012 

              

 

                    SACROILIITIS NEC                   ICD-9: 720.2             

      2012    

           

 

                    Radiculopathy of leg                   ICD-9: 724.4         

          2012

               

 

                    LUMB/LUMBOSAC DISC DEGEN                   ICD-9: 722.52    

               

2012                

 

                    SCIATICA                   ICD-9: 724.3                               

   

 

                    PAIN, LOWER BACK                   ICD-9: 724.2             

      2012    

           

 

                    Sacroiliac dysfunction                   ICD-9: 739.4       

            

2012                

 

                    HYPOTENSION                   ICD-9: 458.9                  

 2012         

      

 

                    PHARYNGITIS, ACUTE                   ICD-9: 462             

      2011    

           

 

                    DEPRESSIVE DISORDER NEC                   ICD-9: 311        

           

2011                

 

                    Heat exhaustion                   ICD-9: 992.5              

     2010     

          

 

                    DIAPHRAGMATIC HERNIA                   ICD-9: 553.3         

          2010

               

 

                    Gastritis                   ICD-9: 535.50                   

2010          

     

 

                    Esophagitis                   ICD-9: 530.10                 

  2010        

       



                                                                    



Reason For Visit

                      



                    Reason For Visit                   Effective Dates          

         Notes      

         

 

                    hearing loss                   2019                   

left ear            

   

 

                    follow up                   06/10/2019                      

             

 

                    follow up                   2019                   Jelena inman has upcoming 

appointment with Dr Claire and carotid dopplers tomorrow                

 

                    follow up                   2018                   Jelena inman had heart cath 

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                

 

                    follow up                   2018                   [address]

[place]                

 

                    follow up                   10/02/2018                      

             

 

                    follow up                   2018                      

             

 

                    high blood pressure                   2018            

       Dr Claire has 

ordered holter monitor and hydralazine 50mg PRN                

 

                    dizziness                   2018                   On 

 morning 

patient woke up and went to the bathroom and after lying down became very dizzy.
Patient has hx of vertigo so didn't think anything of it. She usually just has 
them intermittently, but had more that day. While at [facility] began to feel very 
ill and became very shaky. She got home and sat in the recliner and had the 
jittery/nervous feeling. Later that night it finally resolved. Patient feels 
like she had a spell like this around the  and thought it was due to 
extreme heat exhaustion.                

 

                    follow up                   2018                      

             

 

                    back pain                   2018                      

             

 

                    otalgia                   2018                        

           

 

                    back pain                   2018                      

             

 

                    injection(s)                   10/06/2017                   

flu shot            

   

 

                    lab draw                   2017                       

            

 

                    follow up                   2017                      

             

 

                    pelvic pain                   2017                   P

atient states pain 

started in May with a "Constant Ache" to left inguinal area. Patient states at 
that time pain was intermittent. Then, approximately 2nd week  of  pain 
worsened and radiates to left low back area.                

 

                    lab draw                   2017                       

            

 

                    hyperglycemia                   2017                  

                 

 

                    cough                   2017                          

         

 

                    otalgia                   2016                        

           

 

                    injection(s)                   10/07/2016                   

Influenza           

    

 

                    lab draw                   2016                       

            

 

                    constipation                   2016                   

                

 

                    flank pain                   2016                     

              

 

                    follow up                   2015                   3mo

 fwup               



 

                    injection(s)                   10/16/2015                   

Influenza           

    

 

                    acute renal failure                   09/15/2015            

                    

  

 

                    lab draw                   09/10/2015                       

            

 

                    fatigue                   2015                        

           

 

                    otalgia                   2015                        

           

 

                    pain, limb                   2015                     

              

 

                    follow up                   2015                   Hos

pital fwup          

     

 

                    high blood pressure                   2015            

                    

  

 

                    injection(s)                   10/17/2014                   

flu shot            

   

 

                    sinusitis                   2014                      

             

 

                    high blood pressure                   2014            

                    

  

 

                    cough                   2014                   Was hig

ht at Dr Claire's 

office so he started he on amlodipine 10mg but seems to be dragging her down. 
Patient decreased to 1/2 tablet this last week                

 

                    lab draw                   2014                       

            

 

                    cough                   2014                          

         

 

                    cough                   10/15/2013                          

         

 

                    high blood pressure                   2013            

                    

  

 

                    follow up                   2013                   ER 

               

 

                    dizziness                   2013                      

             

 

                    follow up                   2013                      

             

 

                    otalgia                   05/10/2013                        

           

 

                    breast complaint                   2013               

                    

 

                    lab draw                   2012                       

            

 

                    follow up                   2012                   2mo

 fwup               



 

                    follow up                   10/23/2012                   2wk

 fwup               



 

                    gas and bloating                   10/10/2012               

    Dr Claire has her

monitoring because was high in his office at last visit                

 

                    injection(s)                   2012                   

                

 

                    dizziness                   2012                      

             

 

                    dizziness                   2012                      

             

 

                    lab draw                   2012                       

            

 

                    muscle weakness                   2012                

   concerns of 

simvastatin with fatigue and muscle weakness                

 

                    leg pain/sciatica                   2012              

                    



 

                    hip pain                   2012                       

            

 

                    back pain                   2012                   has

 tried ice pack, 

muscle relaxers, and moist heat                

 

                    back pain                   2012                      

             

 

                    fatigue                   2012                   in ha

nds/feet            

   

 

                    otalgia                   2011                        

           

 

                    follow up                   2011                   2wk

 fwup               



 

                    back pain                   2011                   thi

nks ulcer may have 

returned                

 

                    lab draw                   2011                       

            

 

                    dizziness                   2011                   Lef

t ear and facial 

pain, right ear pain                 

 

                    follow up                   2011                   1wk

 fwup               



 

                    hip pain                   2011                   feel

s very draggy, 

fatigue, BP 80/63, normally running 100's/60's                

 

                    chills                   2010                         

          

 

                    injection(s)                   10/06/2010                   

flu shot            

   

 

                    follow up                   2010                   6wk

 fwup, had HH 

repaired and is starting to feel better, started on reglan 10mg tid             
  

 

                    follow up                   2010                   hos

p fwup              

 

 

                    follow up                   2010                   1mo

 fwup, saw Dr Claire 

and hasn't gave cardiac clearance yet for HH repair, did have chemical stress 
test yesterday and waiting on results                

 

                    follow up                   2010                   fro

m 

EGD/colonoscopy--has Hiatal Hernia and wants to do repair                

 

                    follow up                   2010                      

             



                                                                              



Results

                      



                    Observation                   Observation Code              

     Item           

                    Item Code                   Result                   Date   

             

 

                    GFR CALC                   [id number]                   GFR Non

 Afr Amr            

                                        48 mL/min                   06/10/2019  

              

 

                    GFR CALC                   [id number]                   GFR Afr

 Amr                

                                        59 mL/min                   06/10/2019  

              

 

                    THYROID STIMULATING HORMONE                   94636         

          TSH       

                                        1.936 uIU/mL                   06/10/201

9         

      

 

                    COMPREHENSIVE METABOLIC                   70128             

      AST           

                                        18 U/L                   06/10/2019     

           

 

                    COMPREHENSIVE METABOLIC                   87719             

      ALT           

                                        11 U/L                   06/10/2019     

           

 

                    COMPREHENSIVE METABOLIC                   61556             

      BUN           

                                        18 mg/dL                   06/10/2019   

             

 

                    COMPREHENSIVE METABOLIC                   40932             

      ALBUMIN       

                                        4.2 g/dL                   06/10/2019   

          

  

 

                    COMPREHENSIVE METABOLIC                   82557             

      CHLORIDE      

                                        109 mmol/L                   06/10/2019 

         

     

 

                    COMPREHENSIVE METABOLIC                   51582             

      Bili Total    

                                        0.4 mg/dL                   06/10/2019  

       

      

 

                    COMPREHENSIVE METABOLIC                   26778             

      ALK PHOS      

                                        64 U/L                   06/10/2019     

         

 

 

                    COMPREHENSIVE METABOLIC                   07161             

      SODIUM        

                                        142 mmol/L                   06/10/2019 

           

   

 

                    COMPREHENSIVE METABOLIC                   04051             

      CREATININE    

                                        1.09 mg/dL                   06/10/2019 

       

       

 

                    COMPREHENSIVE METABOLIC                   83217             

      CALCIUM       

                                        9.3 mg/dL                   06/10/2019  

          

   

 

                    COMPREHENSIVE METABOLIC                   07927             

      POTASSIUM     

                                        4.7 mmol/L                   06/10/2019 

        

      

 

                    COMPREHENSIVE METABOLIC                   39561             

      Total Protein 

                                        6.3 g/dL                   06/10/2019   

    

        

 

                    COMPREHENSIVE METABOLIC                   82612             

      Glucose       

                                        84 mg/dL                   06/10/2019   

          

  

 

                    COMPREHENSIVE METABOLIC                   12923             

      Bicarbonate   

                                        25 mmol/L                   06/10/2019  

      

       

 

                    COMPREHENSIVE METABOLIC                   59342             

      AGAP          

                                        8 mmol/L                   06/10/2019   

             

 

                    COMPLETE BLOOD COUNT                   [id number]              

     WBC            

                                        7.8 10e9/L                   06/10/2019 

               

 

                    COMPLETE BLOOD COUNT                   [id number]              

     RBC            

                                        4.04 10e12/L                   06/10/201

9              

 

 

                    COMPLETE BLOOD COUNT                   [id number]              

     HEMOGLOBIN     

                                        12.2 g/dL                   06/10/2019  

        

     

 

                    COMPLETE BLOOD COUNT                   [id number]              

     HEMATOCRIT     

                                        38.6 %                   06/10/2019     

        

  

 

                    COMPLETE BLOOD COUNT                   [id number]              

     MCV            

                                        95.5 fL                   06/10/2019    

            

 

                    COMPLETE BLOOD COUNT                   [id number]              

     MCH            

                                        30.2 pg                   06/10/2019    

            

 

                    COMPLETE BLOOD COUNT                   [id number]              

     MCHC           

                                        31.6 g/dL                   06/10/2019  

              

 

                    COMPLETE BLOOD COUNT                   [id number]              

     PLATELET COUNT 

                                        215 10e9/L                   06/10/2019 

    

          

 

                    COMPLETE BLOOD COUNT                   [id number]              

     Mean Plt Volume

                                        11.2 fL                   06/10/2019    

   

        

 

                    COMPLETE BLOOD COUNT                   [id number]              

     Neut Auto      

                                        45.7 %                   06/10/2019     

         

 

 

                    COMPLETE BLOOD COUNT                   [id number]              

     Lymph Auto     

                                        42.1 %                   06/10/2019     

        

  

 

                    COMPLETE BLOOD COUNT                   [id number]              

     Mono Auto      

                                        9.2 %                   06/10/2019      

         



 

                    COMPLETE BLOOD COUNT                   [id number]              

     RDW            

                                        13.4 %                   06/10/2019     

           

 

                    COMPLETE BLOOD COUNT                   [id number]              

     Eos Auto       

                                        2.7 %                   06/10/2019      

          

 

                    COMPLETE BLOOD COUNT                   [id number]              

     Baso Auto      

                                        0.3 %                   06/10/2019      

         



 

                    COMPLETE BLOOD COUNT                   [id number]              

     Neutrophil Abs 

                                        3.56 10e9/L                   06/10/2019

    

           

 

                    COMPLETE BLOOD COUNT                   [id number]              

     Lymphocyte Abs 

                                        3.28 10e9/L                   06/10/2019

    

           

 

                    COMPLETE BLOOD COUNT                   [id number]              

     Monocyte Abs   

                                        0.72 10e9/L                   06/10/2019

      

         

 

                    COMPLETE BLOOD COUNT                   [id number]              

     Eosinophil Abs 

                                        0.21 10e9/L                   06/10/2019

    

           

 

                    COMPLETE BLOOD COUNT                   [id number]              

     RDW-SD         

                                        44.9 fL                   06/10/2019    

            

 

                    COMPLETE BLOOD COUNT                   [id number]              

     Basophil Abs   

                                        0.02 10e9/L                   06/10/2019

      

         

 

                    COMPLETE BLOOD COUNT                   [id number]              

     WBC            

                                        5.2 10e9/L                   2018 

               

 

                    COMPLETE BLOOD COUNT                   [id number]              

     RBC            

                                        4.21 10e12/L                   

8              

 

 

                    COMPLETE BLOOD COUNT                   [id number]              

     HEMOGLOBIN     

                                        12.8 g/dL                   2018  

        

     

 

                    COMPLETE BLOOD COUNT                   [id number]              

     HEMATOCRIT     

                                        39.0 %                   2018     

        

  

 

                    COMPLETE BLOOD COUNT                   [id number]              

     MCV            

                                        92.6 fL                   2018    

            

 

                    COMPLETE BLOOD COUNT                   [id number]              

     MCH            

                                        30.4 pg                   2018    

            

 

                    COMPLETE BLOOD COUNT                   [id number]              

     MCHC           

                                        32.8 g/dL                   2018  

              

 

                    COMPLETE BLOOD COUNT                   [id number]              

     PLATELET COUNT 

                                        202 10e9/L                   2018 

    

          

 

                    COMPLETE BLOOD COUNT                   [id number]              

     Mean Plt Volume

                                        10.7 fL                   2018    

   

        

 

                    COMPLETE BLOOD COUNT                   [id number]              

     Neut Auto      

                                        40.9 %                   2018     

         

 

 

                    COMPLETE BLOOD COUNT                   [id number]              

     Lymph Auto     

                                        46.3 %                   2018     

        

  

 

                    COMPLETE BLOOD COUNT                   [id number]              

     Mono Auto      

                                        9.3 %                   2018      

         



 

                    COMPLETE BLOOD COUNT                   [id number]              

     RDW            

                                        13.7 %                   2018     

           

 

                    COMPLETE BLOOD COUNT                   [id number]              

     Eos Auto       

                                        2.9 %                   2018      

          

 

                    COMPLETE BLOOD COUNT                   [id number]              

     Baso Auto      

                                        0.6 %                   2018      

         



 

                    COMPLETE BLOOD COUNT                   [id number]              

     Neutrophil Abs 

                                        2.13 10e9/L                   2018

    

           

 

                    COMPLETE BLOOD COUNT                   [id number]              

     Lymphocyte Abs 

                                        2.41 10e9/L                   2018

    

           

 

                    COMPLETE BLOOD COUNT                   [id number]              

     Monocyte Abs   

                                        0.48 10e9/L                   2018

      

         

 

                    COMPLETE BLOOD COUNT                   [id number]              

     Eosinophil Abs 

                                        0.15 10e9/L                   2018

    

           

 

                    COMPLETE BLOOD COUNT                   [id number]              

     RDW-SD         

                                        45.2 fL                   2018    

            

 

                    COMPLETE BLOOD COUNT                   [id number]              

     Basophil Abs   

                                        0.03 10e9/L                   2018

      

         

 

                    METABOLIC PANEL TOTAL CA                   67857            

       Glucose      

                                        118 mg/dL                   2018  

         

    

 

                    METABOLIC PANEL TOTAL CA                   67896            

       CREATININE   

                                        1.01 mg/dL                   2018 

      

        

 

                    METABOLIC PANEL TOTAL CA                   98716            

       BUN          

                                        14 mg/dL                   2018   

             

 

                    METABOLIC PANEL TOTAL CA                   32337            

       SODIUM       

                                        141 mmol/L                   2018 

          

    

 

                    METABOLIC PANEL TOTAL CA                   87839            

       POTASSIUM    

                                        4.0 mmol/L                   2018 

       

       

 

                    METABOLIC PANEL TOTAL CA                   28763            

       CHLORIDE     

                                        108 mmol/L                   2018 

        

      

 

                    METABOLIC PANEL TOTAL CA                   78828            

       Bicarbonate  

                                        25 mmol/L                   2018  

     

        

 

                    METABOLIC PANEL TOTAL CA                   92445            

       AGAP         

                                        8 mmol/L                   2018   

            



 

                    METABOLIC PANEL TOTAL CA                   32380            

       CALCIUM      

                                        9.6 mg/dL                   2018  

         

    

 

                FREE T4                   30926                   T4 Free       

                

                          1.23 ng/dL                   2018               

 

 

                    GFR CALC                   [id number]                   GFR Non

 Afr Amr            

                                        53 mL/min                   2018  

              

 

                    GFR CALC                   [id number]                   GFR Afr

 Amr                

                                        >60 mL/min                   2018 

               

 

                    THYROID STIMULATING HORMONE                   28762         

          TSH       

                                        2.124 uIU/mL                   

8         

      

 

                    LIPID GROUP                   [id number]                   Choles

terol               

                                        152 mg/dL                   2018  

              

 

                    LIPID GROUP                   [id number]                   Trigly

ceride              

                                        151 mg/dL                   2018  

              

 

                    LIP[employer] GROUP                   [id number]                   HDL CH

OLESTEROL           

                                        47 mg/dL                   2018   

             

 

                    LIPID GROUP                   [id number]                   Chol/H

DL Ratio            

                                        3.23 ratio                   2018 

               

 

                    LIP[employer] GROUP                   [id number]                   NON-HD

L Chol              

                                        105 mg/dL                   2018  

              

 

                    LIP[employer] GROUP                   [id number]                   LDL Ch

olesterol           

                                        75 mg/dL                   2018   

             

 

                    ASSAY OF TROPONIN QUANT                   38472             

      Troponin-I    

                                        <0.30 ng/mL                   2018

       

        

 

                    COMPREHENSIVE METABOLIC                   40226             

      AST           

                                        20 U/L                   2018     

           

 

                    COMPREHENSIVE METABOLIC                   65964             

      ALT           

                                        14 U/L                   2018     

           

 

                    COMPREHENSIVE METABOLIC                   72864             

      BUN           

                                        19 mg/dL                   2018   

             

 

                    COMPREHENSIVE METABOLIC                   97464             

      ALBUMIN       

                                        4.2 g/dL                   2018   

          

  

 

                    COMPREHENSIVE METABOLIC                   73175             

      CHLORIDE      

                                        102 mmol/L                   2018 

         

     

 

                    COMPREHENSIVE METABOLIC                   88293             

      Bili Total    

                                        0.4 mg/dL                   2018  

       

      

 

                    COMPREHENSIVE METABOLIC                   09879             

      ALK PHOS      

                                        66 U/L                   2018     

         

 

 

                    COMPREHENSIVE METABOLIC                   63783             

      SODIUM        

                                        135 mmol/L                   2018 

           

   

 

                    COMPREHENSIVE METABOLIC                   29708             

      CREATININE    

                                        1.01 mg/dL                   2018 

       

       

 

                    COMPREHENSIVE METABOLIC                   58259             

      CALCIUM       

                                        9.3 mg/dL                   2018  

          

   

 

                    COMPREHENSIVE METABOLIC                   20167             

      POTASSIUM     

                                        4.8 mmol/L                   2018 

        

      

 

                    COMPREHENSIVE METABOLIC                   45291             

      Total Protein 

                                        7.0 g/dL                   2018   

    

        

 

                    COMPREHENSIVE METABOLIC                   55583             

      Glucose       

                                        91 mg/dL                   2018   

          

  

 

                    COMPREHENSIVE METABOLIC                   37869             

      Bicarbonate   

                                        23 mmol/L                   2018  

      

       

 

                    COMPREHENSIVE METABOLIC                   12763             

      AGAP          

                                        10 mmol/L                   2018  

             



 

                    COMPLETE BLOOD COUNT                   [id number]              

     WBC            

                                        7.5 10e9/L                   2018 

               

 

                    COMPLETE BLOOD COUNT                   [id number]              

     RBC            

                                        4.13 10e12/L                   

8              

 

 

                    COMPLETE BLOOD COUNT                   [id number]              

     HEMOGLOBIN     

                                        12.6 g/dL                   2018  

        

     

 

                    COMPLETE BLOOD COUNT                   6456606              

     HEMATOCRIT     

                                        38.4 %                   2018     

        

  

 

                    COMPLETE BLOOD COUNT                   [id number]              

     MCV            

                                        93.0 fL                   2018    

            

 

                    COMPLETE BLOOD COUNT                   [id number]              

     MCH            

                                        30.5 pg                   2018    

            

 

                    COMPLETE BLOOD COUNT                   [id number]              

     MCHC           

                                        32.8 g/dL                   2018  

              

 

                    COMPLETE BLOOD COUNT                   [id number]              

     PLATELET COUNT 

                                        204 10e9/L                   2018 

    

          

 

                    COMPLETE BLOOD COUNT                   [id number]              

     Mean Plt Volume

                                        10.9 fL                   2018    

   

        

 

                    COMPLETE BLOOD COUNT                   [id number]              

     Neut Auto      

                                        43.1 %                   2018     

         

 

 

                    COMPLETE BLOOD COUNT                   [id number]              

     Lymph Auto     

                                        45.0 %                   2018     

        

  

 

                    COMPLETE BLOOD COUNT                   [id number]              

     Mono Auto      

                                        9.2 %                   2018      

         



 

                    COMPLETE BLOOD COUNT                   [id number]              

     RDW            

                                        13.4 %                   2018     

           

 

                    COMPLETE BLOOD COUNT                   [id number]              

     Eos Auto       

                                        2.3 %                   2018      

          

 

                    COMPLETE BLOOD COUNT                   [id number]              

     Baso Auto      

                                        0.4 %                   2018      

         



 

                    COMPLETE BLOOD COUNT                   [id number]              

     Neutrophil Abs 

                                        3.23 10e9/L                   2018

    

           

 

                    COMPLETE BLOOD COUNT                   [id number]              

     Lymphocyte Abs 

                                        3.38 10e9/L                   2018

    

           

 

                    COMPLETE BLOOD COUNT                   [id number]              

     Monocyte Abs   

                                        0.69 10e9/L                   2018

      

         

 

                    COMPLETE BLOOD COUNT                   [id number]              

     Eosinophil Abs 

                                        0.17 10e9/L                   2018

    

           

 

                    COMPLETE BLOOD COUNT                   [id number]              

     RDW-SD         

                                        44.4 fL                   2018    

            

 

                    COMPLETE BLOOD COUNT                   [id number]              

     Basophil Abs   

                                        0.03 10e9/L                   2018

      

         

 

                    GFR CALC                   6565761                   GFR Non

 Afr Amr            

                                        53 mL/min                   2018  

              

 

                    GFR CALC                   8446180                   GFR Afr

 Amr                

                                        >60 mL/min                   2018 

               

 

                    GLYCOSYLATED HEMOGLOBIN TEST                   47439        

           Hgb A1c  

                    43065-4                   5.4 %                   2018

    

           

 

                    MEAN GLUC                                      Calc M

elisha Gluc            

                                        108 mg/dL                   2018  

              

 

                    MEAN GLUC                                      Calc M

elisha Gluc            

                                        114 mg/dL                   2017  

              

 

                    LIPID GROUP                   [id number]                   Choles

terol               

                                        146 mg/dL                   2017  

              

 

                    LIPID GROUP                   [id number]                   Trigly

ceride              

                                        119 mg/dL                   2017  

              

 

                    LIPID GROUP                   [id number]                   HDL CH

OLESTEROL           

                                        47 mg/dL                   2017   

             

 

                    LIP[employer] GROUP                   [id number]                   Chol/H

DL Ratio            

                                        3.11 ratio                   2017 

               

 

                    LIP[employer] GROUP                   [id number]                   NON-HD

L Chol              

                                        99 mg/dL                   2017   

             

 

                    LIPID GROUP                   [id number]                   LDL Ch

olesterol           

                                        75 mg/dL                   2017   

             

 

                    GLYCOSYLATED HEMOGLOBIN TEST                   90064        

           Hgb A1c  

                    81489-2                   5.6 %                   2017

    

           

 

                    COMPREHENSIVE METABOLIC                   64703             

      AST           

                                        22 U/L                   2017     

           

 

                    COMPREHENSIVE METABOLIC                   60159             

      ALT           

                                        12 U/L                   2017     

           

 

                    COMPREHENSIVE METABOLIC                   43292             

      BUN           

                                        17 mg/dL                   2017   

             

 

                    COMPREHENSIVE METABOLIC                   51430             

      ALBUMIN       

                                        4.0 g/dL                   2017   

          

  

 

                    COMPREHENSIVE METABOLIC                   83015             

      CHLORIDE      

                                        110 mmol/L                   2017 

         

     

 

                    COMPREHENSIVE METABOLIC                   69486             

      Bili Total    

                                        0.4 mg/dL                   2017  

       

      

 

                    COMPREHENSIVE METABOLIC                   04382             

      ALK PHOS      

                                        63 U/L                   2017     

         

 

 

                    COMPREHENSIVE METABOLIC                   04093             

      SODIUM        

                                        140 mmol/L                   2017 

           

   

 

                    COMPREHENSIVE METABOLIC                   42191             

      CREATININE    

                                        1.05 mg/dL                   2017 

       

       

 

                    COMPREHENSIVE METABOLIC                   28590             

      CALCIUM       

                                        9.2 mg/dL                   2017  

          

   

 

                    COMPREHENSIVE METABOLIC                   88070             

      POTASSIUM     

                                        4.2 mmol/L                   2017 

        

      

 

                    COMPREHENSIVE METABOLIC                   09351             

      Total Protein 

                                        6.2 g/dL                   2017   

    

        

 

                    COMPREHENSIVE METABOLIC                   59686             

      Glucose       

                                        87 mg/dL                   2017   

          

  

 

                    COMPREHENSIVE METABOLIC                   50355             

      Bicarbonate   

                                        24 mmol/L                   2017  

      

       

 

                    COMPREHENSIVE METABOLIC                   13358             

      AGAP          

                                        6 mmol/L                   2017   

             

 

                    GFR CALC                   [id number]                   GFR Non

 Afr Amr            

                                        51 mL/min                   2017  

              

 

                    GFR CALC                   6177437                   GFR Afr

 Amr                

                                        >60 mL/min                   2017 

               

 

                    COMPLETE BLOOD COUNT                   [id number]              

     WBC            

                                        6.7 10e9/L                   2017 

               

 

                    COMPLETE BLOOD COUNT                   0950610              

     RBC            

                                        4.04 10e12/L                   

7              

 

 

                    COMPLETE BLOOD COUNT                   [id number]              

     HEMOGLOBIN     

                                        12.1 g/dL                   2017  

        

     

 

                    COMPLETE BLOOD COUNT                   1396050              

     HEMATOCRIT     

                                        38.0 %                   2017     

        

  

 

                    COMPLETE BLOOD COUNT                   [id number]              

     MCV            

                                        94.1 fL                   2017    

            

 

                    COMPLETE BLOOD COUNT                   [id number]              

     MCH            

                                        30.0 pg                   2017    

            

 

                    COMPLETE BLOOD COUNT                   [id number]              

     MCHC           

                                        31.8 g/dL                   2017  

              

 

                    COMPLETE BLOOD COUNT                   1129220              

     PLATELET COUNT 

                                        206 10e9/L                   2017 

    

          

 

                    COMPLETE BLOOD COUNT                   [id number]              

     Mean Plt Volume

                                        11.3 fL                   2017    

   

        

 

                    COMPLETE BLOOD COUNT                   [id number]              

     Neut Auto      

                                        35.8 %                   2017     

         

 

 

                    COMPLETE BLOOD COUNT                   [id number]              

     Lymph Auto     

                                        51.6 %                   2017     

        

  

 

                    COMPLETE BLOOD COUNT                   [id number]              

     Mono Auto      

                                        8.8 %                   2017      

         



 

                    COMPLETE BLOOD COUNT                   [id number]              

     RDW            

                                        13.5 %                   2017     

           

 

                    COMPLETE BLOOD COUNT                   [id number]              

     Eos Auto       

                                        3.4 %                   2017      

          

 

                    COMPLETE BLOOD COUNT                   [id number]              

     Baso Auto      

                                        0.4 %                   2017      

         



 

                    COMPLETE BLOOD COUNT                   [id number]              

     Neutrophil Abs 

                                        2.40 10e9/L                   2017

    

           

 

                    COMPLETE BLOOD COUNT                   [id number]              

     Lymphocyte Abs 

                                        3.46 10e9/L                   2017

    

           

 

                    COMPLETE BLOOD COUNT                   [id number]              

     Monocyte Abs   

                                        0.59 10e9/L                   2017

      

         

 

                    COMPLETE BLOOD COUNT                   [id number]              

     Eosinophil Abs 

                                        0.23 10e9/L                   2017

    

           

 

                    COMPLETE BLOOD COUNT                   [id number]              

     RDW-SD         

                                        45.3 fL                   2017    

            

 

                    COMPLETE BLOOD COUNT                   [id number]              

     Basophil Abs   

                                        0.03 10e9/L                   2017

      

         

 

                    THYROID STIMULATING HORMONE                   94667         

          TSH       

                                        1.981 uIU/mL                   

7         

      

 

                    COMPLETE BLOOD COUNT                   [id number]              

     WBC            

                                        6.0 10e9/L                   2017 

               

 

                    COMPLETE BLOOD COUNT                   [id number]              

     RBC            

                                        4.29 10e12/L                   

7              

 

 

                    COMPLETE BLOOD COUNT                   [id number]              

     HEMOGLOBIN     

                                        12.9 g/dL                   2017  

        

     

 

                    COMPLETE BLOOD COUNT                   [id number]              

     HEMATOCRIT     

                                        38.4 %                   2017     

        

  

 

                    COMPLETE BLOOD COUNT                   [id number]              

     MCV            

                                        89.5 fL                   2017    

            

 

                    COMPLETE BLOOD COUNT                   [id number]              

     MCH            

                                        30.1 pg                   2017    

            

 

                    COMPLETE BLOOD COUNT                   [id number]              

     MCHC           

                                        33.6 g/dL                   2017  

              

 

                    COMPLETE BLOOD COUNT                   [id number]              

     PLATELET COUNT 

                                        181 10e9/L                   2017 

    

          

 

                    COMPLETE BLOOD COUNT                   [id number]              

     Mean Plt Volume

                                        11.7 fL                   2017    

   

        

 

                    COMPLETE BLOOD COUNT                   [id number]              

     Neut Auto      

                                        36.9 %                   2017     

         

 

 

                    COMPLETE BLOOD COUNT                   [id number]              

     Lymph Auto     

                                        50.4 %                   2017     

        

  

 

                    COMPLETE BLOOD COUNT                   [id number]              

     Mono Auto      

                                        9.0 %                   2017      

         



 

                    COMPLETE BLOOD COUNT                   [id number]              

     RDW            

                                        13.7 %                   2017     

           

 

                    COMPLETE BLOOD COUNT                   [id number]              

     Eos Auto       

                                        3.4 %                   2017      

          

 

                    COMPLETE BLOOD COUNT                   [id number]              

     Baso Auto      

                                        0.3 %                   2017      

         



 

                    COMPLETE BLOOD COUNT                   [id number]              

     Neutrophil Abs 

                                        2.21 10e9/L                   2017

    

           

 

                    COMPLETE BLOOD COUNT                   [id number]              

     Lymphocyte Abs 

                                        3.02 10e9/L                   2017

    

           

 

                    COMPLETE BLOOD COUNT                   [id number]              

     Monocyte Abs   

                                        0.54 10e9/L                   2017

      

         

 

                    COMPLETE BLOOD COUNT                   [id number]              

     Eosinophil Abs 

                                        0.20 10e9/L                   2017

    

           

 

                    COMPLETE BLOOD COUNT                   [id number]              

     RDW-SD         

                                        44.0 fL                   2017    

            

 

                    COMPLETE BLOOD COUNT                   [id number]              

     Basophil Abs   

                                        0.02 10e9/L                   2017

      

         

 

                    GLYCOSYLATED HEMOGLOBIN TEST                   28739        

           Hgb A1c  

                    23048-7                   5.4 %                   2017

    

           

 

                    THYROID STIMULATING HORMONE                   04551         

          TSH       

                                        2.200 uIU/mL                   

7         

      

 

                    GFR CALC                   [id number]                   GFR Non

 Afr Amr            

                                        50 mL/min                   2017  

              

 

                    GFR CALC                   9986705                   GFR Afr

 Amr                

                                        >60 mL/min                   2017 

               

 

                    MEAN GLUC                   [id number]                   Calc M

elisha Gluc            

                                        108 mg/dL                   2017  

              

 

                    COMPREHENSIVE METABOLIC                   62716             

      AST           

                                        18 U/L                   2017     

           

 

                    COMPREHENSIVE METABOLIC                   75778             

      ALT           

                                        10 U/L                   2017     

           

 

                    COMPREHENSIVE METABOLIC                   78118             

      BUN           

                                        20 mg/dL                   2017   

             

 

                    COMPREHENSIVE METABOLIC                   99344             

      ALBUMIN       

                                        4.1 g/dL                   2017   

          

  

 

                    COMPREHENSIVE METABOLIC                   67109             

      CHLORIDE      

                                        109 mmol/L                   2017 

         

     

 

                    COMPREHENSIVE METABOLIC                   50001             

      Bili Total    

                                        0.6 mg/dL                   2017  

       

      

 

                    COMPREHENSIVE METABOLIC                   47543             

      ALK PHOS      

                                        64 U/L                   2017     

         

 

 

                    COMPREHENSIVE METABOLIC                   25258             

      SODIUM        

                                        141 mmol/L                   2017 

           

   

 

                    COMPREHENSIVE METABOLIC                   33909             

      CREATININE    

                                        1.06 mg/dL                   2017 

       

       

 

                    COMPREHENSIVE METABOLIC                   13431             

      CALCIUM       

                                        9.9 mg/dL                   2017  

          

   

 

                    COMPREHENSIVE METABOLIC                   08701             

      POTASSIUM     

                                        4.2 mmol/L                   2017 

        

      

 

                    COMPREHENSIVE METABOLIC                   60697             

      Total Protein 

                                        6.3 g/dL                   2017   

    

        

 

                    COMPREHENSIVE METABOLIC                   23279             

      Glucose       

                                        99 mg/dL                   2017   

          

  

 

                    COMPREHENSIVE METABOLIC                   75450             

      Bicarbonate   

                                        21 mmol/L                   2017  

      

       

 

                    COMPREHENSIVE METABOLIC                   60186             

      AGAP          

                                        11 mmol/L                   2017  

             



 

                    LIPID GROUP                   [id number]                   Choles

terol               

                                        169 mg/dL                   2016  

              

 

                    LIPID GROUP                   [id number]                   Trigly

ceride              

                                        165 mg/dL                   2016  

              

 

                    LIPID GROUP                   [id number]                   HDL CH

OLESTEROL           

                                        43 mg/dL                   2016   

             

 

                    LIPID GROUP                   [id number]                   Chol/H

DL Ratio            

                                        3.93 ratio                   2016 

               

 

                    LIPID GROUP                   [id number]                   NON-HD

L Chol              

                                        126 mg/dL                   2016  

              

 

                    LIPID GROUP                   [id number]                   LDL Ch

olesterol           

                                        93 mg/dL                   2016   

             

 

                    COMPREHENSIVE METABOLIC                   06853             

      AST           

                                        18 U/L                   2016     

           

 

                    COMPREHENSIVE METABOLIC                   84793             

      ALT           

                                        10 U/L                   2016     

           

 

                    COMPREHENSIVE METABOLIC                   98091             

      BUN           

                                        20 mg/dL                   2016   

             

 

                    COMPREHENSIVE METABOLIC                   29073             

      ALBUMIN       

                                        3.9 g/dL                   2016   

          

  

 

                    COMPREHENSIVE METABOLIC                   12715             

      CHLORIDE      

                                        110 mmol/L                   2016 

         

     

 

                    COMPREHENSIVE METABOLIC                   75765             

      Bili Total    

                                        0.5 mg/dL                   2016  

       

      

 

                    COMPREHENSIVE METABOLIC                   23459             

      ALK PHOS      

                                        72 U/L                   2016     

         

 

 

                    COMPREHENSIVE METABOLIC                   70052             

      SODIUM        

                                        141 mmol/L                   2016 

           

   

 

                    COMPREHENSIVE METABOLIC                   32055             

      CREATININE    

                                        1.12 mg/dL                   2016 

       

       

 

                    COMPREHENSIVE METABOLIC                   96836             

      CALCIUM       

                                        9.7 mg/dL                   2016  

          

   

 

                    COMPREHENSIVE METABOLIC                   46604             

      POTASSIUM     

                                        4.4 mmol/L                   2016 

        

      

 

                    COMPREHENSIVE METABOLIC                   88259             

      Total Protein 

                                        6.2 g/dL                   2016   

    

        

 

                    COMPREHENSIVE METABOLIC                   91384             

      Glucose       

                                        90 mg/dL                   2016   

          

  

 

                    COMPREHENSIVE METABOLIC                   84676             

      Bicarbonate   

                                        23 mmol/L                   2016  

      

       

 

                    COMPREHENSIVE METABOLIC                   02278             

      AGAP          

                                        8 mmol/L                   2016   

             

 

                    GFR CALC                   5628165                   GFR Non

 Afr Amr            

                                        47 mL/min                   2016  

              

 

                    GFR CALC                   2531351                   GFR Afr

 Amr                

                                        57 mL/min                   2016  

              

 

                    GLYCOSYLATED HEMOGLOBIN TEST                   12592        

           Hgb A1c  

                    70442-3                   5.5 %                   2016

    

           

 

                    THYROID STIMULATING HORMONE                   21182         

          TSH       

                                        2.537 uIU/mL                   

6         

      

 

                FREE T4                   12214                   T4 Free       

                

                          1.36 ng/dL                   2016               

 

 

                    COMPLETE BLOOD COUNT                   [id number]              

     WBC            

                                        6.8 10e9/L                   2016 

               

 

                    COMPLETE BLOOD COUNT                   [id number]              

     RBC            

                                        4.20 10e12/L                   

6              

 

 

                    COMPLETE BLOOD COUNT                   [id number]              

     HEMOGLOBIN     

                                        12.5 g/dL                   2016  

        

     

 

                    COMPLETE BLOOD COUNT                   9834034              

     HEMATOCRIT     

                                        38.0 %                   2016     

        

  

 

                    COMPLETE BLOOD COUNT                   [id number]              

     MCV            

                                        90.5 fL                   2016    

            

 

                    COMPLETE BLOOD COUNT                   5190270              

     MCH            

                                        29.8 pg                   2016    

            

 

                    COMPLETE BLOOD COUNT                   3796955              

     MCHC           

                                        32.9 g/dL                   2016  

              

 

                    COMPLETE BLOOD COUNT                   6743480              

     PLATELET COUNT 

                                        197 10e9/L                   2016 

    

          

 

                    COMPLETE BLOOD COUNT                   3820021              

     Mean Plt Volume

                                        11.7 fL                   2016    

   

        

 

                    COMPLETE BLOOD COUNT                   2949596              

     Neut Auto      

                                        41.3 %                   2016     

         

 

 

                    COMPLETE BLOOD COUNT                   1853689              

     Lymph Auto     

                                        47.1 %                   2016     

        

  

 

                    COMPLETE BLOOD COUNT                   8329138              

     Mono Auto      

                                        7.8 %                   2016      

         



 

                    COMPLETE BLOOD COUNT                   9975391              

     RDW            

                                        13.8 %                   2016     

           

 

                    COMPLETE BLOOD COUNT                   0159075              

     Eos Auto       

                                        3.4 %                   2016      

          

 

                    COMPLETE BLOOD COUNT                   4164254              

     Baso Auto      

                                        0.4 %                   2016      

         



 

                    COMPLETE BLOOD COUNT                   1253850              

     Neutrophil Abs 

                                        2.81 10e9/L                   2016

    

           

 

                    COMPLETE BLOOD COUNT                   9665311              

     Lymphocyte Abs 

                                        3.20 10e9/L                   2016

    

           

 

                    COMPLETE BLOOD COUNT                   7940111              

     Monocyte Abs   

                                        0.53 10e9/L                   2016

      

         

 

                    COMPLETE BLOOD COUNT                   5175880              

     Eosinophil Abs 

                                        0.23 10e9/L                   2016

    

           

 

                    COMPLETE BLOOD COUNT                   3959024              

     RDW-SD         

                                        44.4 fL                   2016    

            

 

                    COMPLETE BLOOD COUNT                   1229413              

     Basophil Abs   

                                        0.03 10e9/L                   2016

      

         

 

                    MEAN GLUC                   8636810                   Calc M

elisha Gluc            

                                        111 mg/dL                   2016  

              

 

                    METABOLIC PANEL TOTAL CA                   47193            

       Glucose      

                                        89 MG/DL                   2015   

         

   

 

                    METABOLIC PANEL TOTAL CA                   80065            

       CREATININE   

                                        1.12 MG/DL                   2015 

      

        

 

                    METABOLIC PANEL TOTAL CA                   37966            

       BUN          

                                        20 MG/DL                   2015   

             

 

                    METABOLIC PANEL TOTAL CA                   66691            

       SODIUM       

                                        139 MMOL/L                   2015 

          

    

 

                    METABOLIC PANEL TOTAL CA                   08923            

       POTASSIUM    

                                        4.6 MMOL/L                   2015 

       

       

 

                    METABOLIC PANEL TOTAL CA                   20790            

       CHLORIDE     

                                        108 MMOL/L                   2015 

        

      

 

                    METABOLIC PANEL TOTAL CA                   57962            

       BICARB       

                                        26 MMOL/L                   2015  

          

   

 

                    METABOLIC PANEL TOTAL CA                   05172            

       ANION GAP    

                                        5 MEQ/L                   2015    

       

    

 

                    METABOLIC PANEL TOTAL CA                   53267            

       CALCIUM      

                                        10.0 MG/DL                   2015 

         

     

 

                GFR CALC                   2848274                   GFR AA     

                

                          57.0L ML/MIN                   2015             

   

 

                    GFR CALC                   7419420                   GFR NON

-AA                 

                                        47.0L ML/MIN                   

5                

 

                    THYROID STIMULATING HORMONE                   30236         

          TSH       

                                        2.378 uIU/ML                   09/10/201

5         

      

 

                    COMPLETE BLOOD COUNT                   [id number]              

     WBC            

                                        6.4 10e9/L                   09/10/2015 

               

 

                    COMPLETE BLOOD COUNT                   7357181              

     RBC            

                                        3.99 10e12/L                   09/10/201

5              

 

 

                    COMPLETE BLOOD COUNT                   0073852              

     HGB            

                                        11.9 g/dL                   09/10/2015  

              

 

                    COMPLETE BLOOD COUNT                   9512483              

     HCT DET        

                                        36.9 %                   09/10/2015     

           

 

                    COMPLETE BLOOD COUNT                   7338499              

     MCV            

                                        92.5 fL                   09/10/2015    

            

 

                    COMPLETE BLOOD COUNT                   [id number]              

     MCH            

                                        29.8 pg                   09/10/2015    

            

 

                    COMPLETE BLOOD COUNT                   0973622              

     MCHC           

                                        32.2 g/dL                   09/10/2015  

              

 

                    COMPLETE BLOOD COUNT                   [id number]              

     PLT            

                                        172 10e9/L                   09/10/2015 

               

 

                    COMPLETE BLOOD COUNT                   [id number]              

     MPV            

                                        11.7 fL                   09/10/2015    

            

 

                    COMPLETE BLOOD COUNT                   [id number]              

     ARIK %          

                                        40.4 %                   09/10/2015     

           

 

                    COMPLETE BLOOD COUNT                   5931341              

     LY %           

                                        48.0 %                   09/10/2015     

           

 

                    COMPLETE BLOOD COUNT                   7901079              

     MON %          

                                        8.3 %                   09/10/2015      

          

 

                    COMPLETE BLOOD COUNT                   [id number]              

     EOS  %         

                                        2.8 %                   09/10/2015      

          

 

                    COMPLETE BLOOD COUNT                   [id number]              

     BASO %         

                                        0.5 %                   09/10/2015      

          

 

                    COMPLETE BLOOD COUNT                   [id number]              

     RDW            

                                        13.6 %                   09/10/2015     

           

 

                    COMPLETE BLOOD COUNT                   [id number]              

     ABS ARIK        

                                        2.59 10e9/L                   09/10/2015

           

    

 

                    COMPLETE BLOOD COUNT                   [id number]              

     ABS LYMPH      

                                        3.07 10e9/L                   09/10/2015

         

      

 

                    COMPLETE BLOOD COUNT                   [id number]              

     ABS MONO       

                                        0.53 10e9/L                   09/10/2015

          

     

 

                    COMPLETE BLOOD COUNT                   [id number]              

     ABS EOS        

                                        0.18 10e9/L                   09/10/2015

           

    

 

                    COMPLETE BLOOD COUNT                   [id number]              

     ABS BASO       

                                        0.03 10e9/L                   09/10/2015

          

     

 

                    COMPLETE BLOOD COUNT                   [id number]              

     RDW-SD         

                                        44.9 fL                   09/10/2015    

            

 

                    LIPID GROUP                   [id number]                   HDL TE

ST                  

                                        42 MG/DL                   09/10/2015   

             

 

                LIPID GROUP                   [id number]                   TRIG      

                

                          177 MG/DL                   09/10/2015                

 

                    LIPID GROUP                   [id number]                   TEST L

DL                  

                                        72 MG/DL                   09/10/2015   

             

 

                LIPID GROUP                   [id number]                   CHOL      

                

                          149 MG/DL                   09/10/2015                

 

                    LIPID GROUP                   [id number]                   RCHOL/

HDL                 

                                        3.55 RATIO                   09/10/2015 

               

 

                    LIPID GROUP                   [id number]                   NON-HD

L CH                

                                        107 MG/DL                   09/10/2015  

              

 

                    GLYCOSYLATED HEMOGLOBIN TEST                   21944        

           A1C HPLC 

                    36100-5                   5.5 %                   09/10/2015

   

            

 

                FREE T4                   18612                   FREE T4       

                

                          1.39 NG/DL                   09/10/2015               

 

 

                GFR CALC                   7687788                   GFR AA     

                

                          55.0L ML/MIN                   09/10/2015             

   

 

                    GFR CALC                   7235759                   GFR NON

-AA                 

                                        46.0L ML/MIN                   09/10/201

5                

 

                    COMPREHENSIVE METABOLIC                   88799             

      AST           

                                        17 U/L                   09/10/2015     

           

 

                    COMPREHENSIVE METABOLIC                   17059             

      ALT           

                                        10 IU/L                   09/10/2015    

            

 

                    COMPREHENSIVE METABOLIC                   62867             

      BUN           

                                        20 MG/DL                   09/10/2015   

             

 

                    COMPREHENSIVE METABOLIC                   60620             

      ALBUMIN       

                                        3.9 GM/DL                   09/10/2015  

          

   

 

                    COMPREHENSIVE METABOLIC                   92416             

      CHLORIDE      

                                        111 MMOL/L                   09/10/2015 

         

     

 

                    COMPREHENSIVE METABOLIC                   01328             

      BILI TOT      

                                        0.4 MG/DL                   09/10/2015  

         

    

 

                    COMPREHENSIVE METABOLIC                   06099             

      ALK PHOS      

                                        70 U/L                   09/10/2015     

         

 

 

                    COMPREHENSIVE METABOLIC                   73167             

      SODIUM        

                                        142 MMOL/L                   09/10/2015 

           

   

 

                    COMPREHENSIVE METABOLIC                   20137             

      CREATININE    

                                        1.16 MG/DL                   09/10/2015 

       

       

 

                    COMPREHENSIVE METABOLIC                   39011             

      CALCIUM       

                                        9.4 MG/DL                   09/10/2015  

          

   

 

                    COMPREHENSIVE METABOLIC                   50630             

      POTASSIUM     

                                        4.6 MMOL/L                   09/10/2015 

        

      

 

                    COMPREHENSIVE METABOLIC                   69474             

      PROT TOT      

                                        6.2 GM/DL                   09/10/2015  

         

    

 

                    COMPREHENSIVE METABOLIC                   66247             

      Glucose       

                                        90 MG/DL                   09/10/2015   

          

  

 

                    COMPREHENSIVE METABOLIC                   23533             

      BICARB        

                                        24 MMOL/L                   09/10/2015  

           

  

 

                    COMPREHENSIVE METABOLIC                   67135             

      ANION GAP     

                                        7 MEQ/L                   09/10/2015    

        

   

 

                    THYROID STIMULATING HORMONE                   27621         

          TSH       

                                        2.427 uIU/ML                   

5         

      

 

                    LIPID GROUP                   [id number]                   HDL TE

ST                  

                                        47 MG/DL                   2015   

             

 

                LIPID GROUP                   [id number]                   TRIG      

                

                          145 MG/DL                   2015                

 

                    LIPID GROUP                   [id number]                   TEST L

DL                  

                                        73 MG/DL                   2015   

             

 

                LIPID GROUP                   [id number]                   CHOL      

                

                          149 MG/DL                   2015                

 

                    LIPID GROUP                   [id number]                   RCHOL/

HDL                 

                                        3.17 RATIO                   2015 

               

 

                    LIPID GROUP                   [id number]                   NON-HD

L CH                

                                        102 MG/DL                   2015  

              

 

                    COMPREHENSIVE METABOLIC                   10181             

      AST           

                                        17 U/L                   2015     

           

 

                    COMPREHENSIVE METABOLIC                   25596             

      ALT           

                                        9 IU/L                   2015     

           

 

                    COMPREHENSIVE METABOLIC                   97107             

      BUN           

                                        19 MG/DL                   2015   

             

 

                    COMPREHENSIVE METABOLIC                   87268             

      ALBUMIN       

                                        4.3 GM/DL                   2015  

          

   

 

                    COMPREHENSIVE METABOLIC                   77115             

      CHLORIDE      

                                        108 MMOL/L                   2015 

         

     

 

                    COMPREHENSIVE METABOLIC                   57736             

      BILI TOT      

                                        0.5 MG/DL                   2015  

         

    

 

                    COMPREHENSIVE METABOLIC                   36867             

      ALK PHOS      

                                        68 U/L                   2015     

         

 

 

                    COMPREHENSIVE METABOLIC                   20764             

      SODIUM        

                                        140 MMOL/L                   2015 

           

   

 

                    COMPREHENSIVE METABOLIC                   81663             

      CREATININE    

                                        1.08 MG/DL                   2015 

       

       

 

                    COMPREHENSIVE METABOLIC                   66238             

      CALCIUM       

                                        9.9 MG/DL                   2015  

          

   

 

                    COMPREHENSIVE METABOLIC                   71282             

      POTASSIUM     

                                        4.3 MMOL/L                   2015 

        

      

 

                    COMPREHENSIVE METABOLIC                   16215             

      PROT TOT      

                                        7.2 GM/DL                   2015  

         

    

 

                    COMPREHENSIVE METABOLIC                   09461             

      Glucose       

                                        94 MG/DL                   2015   

          

  

 

                    COMPREHENSIVE METABOLIC                   09942             

      BICARB        

                                        26 MMOL/L                   2015  

           

  

 

                    COMPREHENSIVE METABOLIC                   86051             

      ANION GAP     

                                        6 MEQ/L                   2015    

        

   

 

                GFR CALC                   3531575                   GFR AA     

                

                          60.0L ML/MIN                   2015             

   

 

                    GFR CALC                   0418715                   GFR NON

-AA                 

                                        49.0L ML/MIN                   

5                

 

                    GLYCOSYLATED HEMOGLOBIN TEST                   71450        

           A1C HPLC 

                    12088-2                   5.6 %                   2015

   

            

 

                    COMPLETE BLOOD COUNT                   [id number]              

     WBC            

                                        7.2 10e9/L                   2015 

               

 

                    COMPLETE BLOOD COUNT                   [id number]              

     RBC            

                                        4.28 10e12/L                   

5              

 

 

                    COMPLETE BLOOD COUNT                   [id number]              

     HGB            

                                        12.8 g/dL                   2015  

              

 

                    COMPLETE BLOOD COUNT                   4433272              

     HCT DET        

                                        39.3 %                   2015     

           

 

                    COMPLETE BLOOD COUNT                   [id number]              

     MCV            

                                        91.8 fL                   2015    

            

 

                    COMPLETE BLOOD COUNT                   0303686              

     MCH            

                                        29.9 pg                   2015    

            

 

                    COMPLETE BLOOD COUNT                   [id number]              

     MCHC           

                                        32.6 g/dL                   2015  

              

 

                    COMPLETE BLOOD COUNT                   [id number]              

     PLT            

                                        189 10e9/L                   2015 

               

 

                    COMPLETE BLOOD COUNT                   [id number]              

     MPV            

                                        11.2 fL                   2015    

            

 

                    COMPLETE BLOOD COUNT                   3412649              

     ARIK %          

                                        38.0 %                   2015     

           

 

                    COMPLETE BLOOD COUNT                   9742685              

     LY %           

                                        51.0 %                   2015     

           

 

                    COMPLETE BLOOD COUNT                   3142533              

     MON %          

                                        7.7 %                   2015      

          

 

                    COMPLETE BLOOD COUNT                   8280155              

     EOS  %         

                                        2.9 %                   2015      

          

 

                    COMPLETE BLOOD COUNT                   6161937              

     BASO %         

                                        0.4 %                   2015      

          

 

                    COMPLETE BLOOD COUNT                   0105839              

     RDW            

                                        14.0 %                   2015     

           

 

                    COMPLETE BLOOD COUNT                   4275028              

     ABS ARIK        

                                        2.74 10e9/L                   2015

           

    

 

                    COMPLETE BLOOD COUNT                   [id number]              

     ABS LYMPH      

                                        3.67 10e9/L                   2015

         

      

 

                    COMPLETE BLOOD COUNT                   [id number]              

     ABS MONO       

                                        0.55 10e9/L                   2015

          

     

 

                    COMPLETE BLOOD COUNT                   [id number]              

     ABS EOS        

                                        0.21 10e9/L                   2015

           

    

 

                    COMPLETE BLOOD COUNT                   [id number]              

     ABS BASO       

                                        0.03 10e9/L                   2015

          

     

 

                    COMPLETE BLOOD COUNT                   [id number]              

     RDW-SD         

                                        46.1 fL                   2015    

            

 

                FREE T4                   51336                   FREE T4       

                

                          1.14 NG/DL                   2015               

 

 

                    GLYCOSYLATED HEMOGLOBIN TEST                   99742        

           A1C HPLC 

                    48172-2                   5.2 %                   2014

   

            

 

                FREE T4                   21597                   FREE T4       

                

                          1.40 NG/DL                   2014               

 

 

                GFR CALC                   3531888                   GFR AA     

                

                          >60 ML/MIN                   2014               

 

 

                    GFR CALC                   0407890                   GFR NON

-AA                 

                                        52.0L ML/MIN                   

4                

 

                    COMPREHENSIVE METABOLIC                   11163             

      AST           

                                        15 U/L                   2014     

           

 

                    COMPREHENSIVE METABOLIC                   86459             

      ALT           

                                        9 IU/L                   2014     

           

 

                    COMPREHENSIVE METABOLIC                   04851             

      BUN           

                                        17 MG/DL                   2014   

             

 

                    COMPREHENSIVE METABOLIC                   92604             

      ALBUMIN       

                                        4.0 GM/DL                   2014  

          

   

 

                    COMPREHENSIVE METABOLIC                   86319             

      CHLORIDE      

                                        112 MMOL/L                   2014 

         

     

 

                    COMPREHENSIVE METABOLIC                   28536             

      BILI TOT      

                                        0.5 MG/DL                   2014  

         

    

 

                    COMPREHENSIVE METABOLIC                   16237             

      ALK PHOS      

                                        66 U/L                   2014     

         

 

 

                    COMPREHENSIVE METABOLIC                   27430             

      SODIUM        

                                        140 MMOL/L                   2014 

           

   

 

                    COMPREHENSIVE METABOLIC                   34329             

      CREATININE    

                                        1.03 MG/DL                   2014 

       

       

 

                    COMPREHENSIVE METABOLIC                   03549             

      CALCIUM       

                                        9.5 MG/DL                   2014  

          

   

 

                    COMPREHENSIVE METABOLIC                   58516             

      POTASSIUM     

                                        4.1 MMOL/L                   2014 

        

      

 

                    COMPREHENSIVE METABOLIC                   95834             

      PROT TOT      

                                        6.2 GM/DL                   2014  

         

    

 

                    COMPREHENSIVE METABOLIC                   83816             

      Glucose       

                                        102 MG/DL                   2014  

          

   

 

                    COMPREHENSIVE METABOLIC                   84437             

      BICARB        

                                        23 MMOL/L                   2014  

           

  

 

                    COMPREHENSIVE METABOLIC                   48756             

      ANION GAP     

                                        5 MEQ/L                   2014    

        

   

 

                    THYROID STIMULATING HORMONE                   50810         

          TSH       

                                        2.074 uIU/ML                   

4         

      

 

                    VITAMIN B 12 FOLIC ACID                   [id number]       

            VIT B 12

                                        423 PG/ML                   2014  

   

          

 

                    VITAMIN B 12 FOLIC ACID                   [id number]       

            FOLIC 

ACID                                       19.7 NG/ML                   

2014                

 

                    LIPID GROUP                   [id number]                   HDL TE

ST                  

                                        40 MG/DL                   2014   

             

 

                LIPID GROUP                   [id number]                   TRIG      

                

                          145 MG/DL                   2014                

 

                    LIPID GROUP                   [id number]                   TEST L

DL                  

                                        81 MG/DL                   2014   

             

 

                LIPID GROUP                   [id number]                   CHOL      

                

                          150 MG/DL                   2014                

 

                    LIPID GROUP                   [id number]                   RCHOL/

HDL                 

                                        3.75 RATIO                   2014 

               

 

                    COMPLETE BLOOD COUNT                   [id number]              

     WBC            

                                        6.0 10e9/L                   2014 

               

 

                    COMPLETE BLOOD COUNT                   [id number]              

     RBC            

                                        4.26 10e12/L                   

4              

 

 

                    COMPLETE BLOOD COUNT                   [id number]              

     HGB            

                                        12.7 g/dL                   2014  

              

 

                    COMPLETE BLOOD COUNT                   1394126              

     HCT DET        

                                        38.7 %                   2014     

           

 

                    COMPLETE BLOOD COUNT                   3818970              

     MCV            

                                        90.8 fL                   2014    

            

 

                    COMPLETE BLOOD COUNT                   5308709              

     MCH            

                                        29.8 pg                   2014    

            

 

                    COMPLETE BLOOD COUNT                   [id number]              

     MCHC           

                                        32.8 g/dL                   2014  

              

 

                    COMPLETE BLOOD COUNT                   8586474              

     PLT            

                                        178 10e9/L                   2014 

               

 

                    COMPLETE BLOOD COUNT                   [id number]              

     MPV            

                                        11.7 fL                   2014    

            

 

                    COMPLETE BLOOD COUNT                   [id number]              

     ARIK %          

                                        30.5 %                   2014     

           

 

                    COMPLETE BLOOD COUNT                   1019510              

     LY %           

                                        55.4 %                   2014     

           

 

                    COMPLETE BLOOD COUNT                   [id number]              

     MON %          

                                        9.0 %                   2014      

          

 

                    COMPLETE BLOOD COUNT                   1883579              

     EOS  %         

                                        4.4 %                   2014      

          

 

                    COMPLETE BLOOD COUNT                   [id number]              

     BASO %         

                                        0.7 %                   2014      

          

 

                    COMPLETE BLOOD COUNT                   [id number]              

     RDW            

                                        13.3 %                   2014     

           

 

                    COMPLETE BLOOD COUNT                   [id number]              

     ABS ARIK        

                                        1.83 10e9/L                   2014

           

    

 

                    COMPLETE BLOOD COUNT                   [id number]              

     ABS LYMPH      

                                        3.32 10e9/L                   2014

         

      

 

                    COMPLETE BLOOD COUNT                   [id number]              

     ABS MONO       

                                        0.54 10e9/L                   2014

          

     

 

                    COMPLETE BLOOD COUNT                   [id number]              

     ABS EOS        

                                        0.26 10e9/L                   2014

           

    

 

                    COMPLETE BLOOD COUNT                   [id number]              

     ABS BASO       

                                        0.04 10e9/L                   2014

          

     

 

                    COMPLETE BLOOD COUNT                   [id number]              

     RDW-SD         

                                        43.2 fL                   2014    

            

 

                    HEMOGLOBIN A1C (GLYCOSYLATED)                   [id number]     

              A1C 

[facility]                   94796-4                   5.5 %                   

2012                

 

                    COMPLETE BLOOD COUNT                   [id number]              

     WBC            

                                        6.0 10e9/L                   2012 

               

 

                    COMPLETE BLOOD COUNT                   [id number]              

     RBC            

                                        4.22 10e12/L                   

2              

 

 

                    COMPLETE BLOOD COUNT                   [id number]              

     HGB            

                                        12.4 g/dL                   2012  

              

 

                    COMPLETE BLOOD COUNT                   [id number]              

     HCT DET        

                                        38.2 %                   2012     

           

 

                    COMPLETE BLOOD COUNT                   [id number]              

     MCV            

                                        90.5 fL                   2012    

            

 

                    COMPLETE BLOOD COUNT                   [id number]              

     MCH            

                                        29.4 pg                   2012    

            

 

                    COMPLETE BLOOD COUNT                   [id number]              

     MCHC           

                                        32.5 g/dL                   2012  

              

 

                    COMPLETE BLOOD COUNT                   [id number]              

     PLT            

                                        187 10e9/L                   2012 

               

 

                    COMPLETE BLOOD COUNT                   [id number]              

     MPV            

                                        11.5 fL                   2012    

            

 

                    COMPLETE BLOOD COUNT                   [id number]              

     ARIK %          

                                        36.4 %                   2012     

           

 

                    COMPLETE BLOOD COUNT                   [id number]              

     LY %           

                                        51.0 %                   2012     

           

 

                    COMPLETE BLOOD COUNT                   [id number]              

     MON %          

                                        8.7 %                   2012      

          

 

                    COMPLETE BLOOD COUNT                   [id number]              

     EOS  %         

                                        3.2 %                   2012      

          

 

                    COMPLETE BLOOD COUNT                   [id number]              

     BASO %         

                                        0.7 %                   2012      

          

 

                    COMPLETE BLOOD COUNT                   [id number]              

     RDW            

                                        13.7 %                   2012     

           

 

                    COMPLETE BLOOD COUNT                   [id number]              

     ABS ARIK        

                                        2.18 10e9/L                   2012

           

    

 

                    COMPLETE BLOOD COUNT                   [id number]              

     ABS LYMPH      

                                        3.06 10e9/L                   2012

         

      

 

                    COMPLETE BLOOD COUNT                   [id number]              

     ABS MONO       

                                        0.52 10e9/L                   2012

          

     

 

                    COMPLETE BLOOD COUNT                   [id number]              

     ABS EOS        

                                        0.19 10e9/L                   2012

           

    

 

                    COMPLETE BLOOD COUNT                   [id number]              

     ABS BASO       

                                        0.04 10e9/L                   2012

          

     

 

                    COMPLETE BLOOD COUNT                   [id number]              

     RDW-SD         

                                        44.3 fL                   2012    

            

 

                    LIPID GROUP                   [id number]                   HDL TE

ST                  

                                        42 MG/DL                   2012   

             

 

                LIPID GROUP                   [id number]                   TRIG      

                

                          156 MG/DL                   2012                

 

                    LIPID GROUP                   [id number]                   TEST L

DL                  

                                        80 MG/DL                   2012   

             

 

                LIPID GROUP                   [id number]                   CHOL      

                

                          153 MG/DL                   2012                

 

                    LIPID GROUP                   [id number]                   RCHOL/

HDL                 

                                        3.64 RATIO                   2012 

               

 

                FREE T4                   18297                   FREE T4       

                

                          1.22 NG/DL                   2012               

 

 

                    COMPREHENSIVE METABOLIC                   55185             

      AST           

                                        20 U/L                   2012     

           

 

                    COMPREHENSIVE METABOLIC                   09889             

      ALT           

                                        11 IU/L                   2012    

            

 

                    COMPREHENSIVE METABOLIC                   54505             

      BUN           

                                        19 MG/DL                   2012   

             

 

                    COMPREHENSIVE METABOLIC                   75200             

      ALBUMIN       

                                        4.3 GM/DL                   2012  

          

   

 

                    COMPREHENSIVE METABOLIC                   74186             

      CHLORIDE      

                                        109 MMOL/L                   2012 

         

     

 

                    COMPREHENSIVE METABOLIC                   18806             

      BILI TOT      

                                        0.6 MG/DL                   2012  

         

    

 

                    COMPREHENSIVE METABOLIC                   26766             

      ALK PHOS      

                                        84 U/L                   2012     

         

 

 

                    COMPREHENSIVE METABOLIC                   24156             

      SODIUM        

                                        142 MMOL/L                   2012 

           

   

 

                    COMPREHENSIVE METABOLIC                   66997             

      CREATININE    

                                        1.09 MG/DL                   2012 

       

       

 

                    COMPREHENSIVE METABOLIC                   60015             

      CALCIUM       

                                        9.8 MG/DL                   2012  

          

   

 

                    COMPREHENSIVE METABOLIC                   02573             

      POTASSIUM     

                                        4.2 MMOL/L                   2012 

        

      

 

                    COMPREHENSIVE METABOLIC                   22484             

      PROT TOT      

                                        6.4 GM/DL                   2012  

         

    

 

                    COMPREHENSIVE METABOLIC                   47149             

      Glucose       

                                        89 MG/DL                   2012   

          

  

 

                    COMPREHENSIVE METABOLIC                   53444             

      BICARB        

                                        25 MMOL/L                   2012  

           

  

 

                    COMPREHENSIVE METABOLIC                   73874             

      ANION GAP     

                                        8 MEQ/L                   2012    

        

   

 

                GFR CALC                   [id number]                   GFR AA     

                

                          60.0L ML/MIN                   2012             

   

 

                    GFR CALC                   [id number]                   GFR NON

-AA                 

                                        49.0L ML/MIN                   

2                

 

                    THYROID STIMULATING HORMONE                   54896         

          TSH       

                                        2.450 uIU/ML                   

2         

      

 

                    COMPREHENSIVE METABOLIC                   74791             

      AST           

                                        22 U/L                   2012     

           

 

                    COMPREHENSIVE METABOLIC                   76558             

      ALT           

                                        14 IU/L                   2012    

            

 

                    COMPREHENSIVE METABOLIC                   51485             

      BUN           

                                        21 MG/DL                   2012   

             

 

                    COMPREHENSIVE METABOLIC                   24016             

      ALBUMIN       

                                        4.3 GM/DL                   2012  

          

   

 

                    COMPREHENSIVE METABOLIC                   67742             

      CHLORIDE      

                                        106 MMOL/L                   2012 

         

     

 

                    COMPREHENSIVE METABOLIC                   05646             

      BILI TOT      

                                        0.4 MG/DL                   2012  

         

    

 

                    COMPREHENSIVE METABOLIC                   89114             

      ALK PHOS      

                                        80 U/L                   2012     

         

 

 

                    COMPREHENSIVE METABOLIC                   31854             

      SODIUM        

                                        141 MMOL/L                   2012 

           

   

 

                    COMPREHENSIVE METABOLIC                   93908             

      CREATININE    

                                        1.13 MG/DL                   2012 

       

       

 

                    COMPREHENSIVE METABOLIC                   98309             

      CALCIUM       

                                        9.4 MG/DL                   2012  

          

   

 

                    COMPREHENSIVE METABOLIC                   81386             

      POTASSIUM     

                                        4.3 MMOL/L                   2012 

        

      

 

                    COMPREHENSIVE METABOLIC                   34449             

      PROT TOT      

                                        6.7 GM/DL                   2012  

         

    

 

                    COMPREHENSIVE METABOLIC                   19053             

      Glucose       

                                        98 MG/DL                   2012   

          

  

 

                    COMPREHENSIVE METABOLIC                   06230             

      BICARB        

                                        25 MMOL/L                   2012  

           

  

 

                    COMPREHENSIVE METABOLIC                   06436             

      ANION GAP     

                                        10 MEQ/L                   2012   

        

    

 

                    LIPID GROUP                   [id number]                   HDL TE

ST                  

                                        44 MG/DL                   2012   

             

 

                LIPID GROUP                   [id number]                   TRIG      

                

                          164 MG/DL                   2012                

 

                    LIPID GROUP                   [id number]                   TEST L

DL                  

                                        98 MG/DL                   2012   

             

 

                LIPID GROUP                   [id number]                   CHOL      

                

                          175 MG/DL                   2012                

 

                    LIPID GROUP                   [id number]                   RCHOL/

HDL                 

                                        3.98 RATIO                   2012 

               

 

                    COMPLETE BLOOD COUNT                   78204                

   WBC              

                                        6.7 10e9/L                   2012 

               

 

                    COMPLETE BLOOD COUNT                   66313                

   RBC              

                                        4.36 10e12/L                   

2                

 

                    COMPLETE BLOOD COUNT                   27338                

   HGB              

                                        12.9 g/dL                   2012  

              

 

                    COMPLETE BLOOD COUNT                   57600                

   HCT DET          

                                        39.4 %                   2012     

           

 

                    COMPLETE BLOOD COUNT                   19564                

   MCV              

                                        90.4 fL                   2012    

            

 

                    COMPLETE BLOOD COUNT                   06939                

   MCH              

                                        29.6 pg                   2012    

            

 

                    COMPLETE BLOOD COUNT                   42947                

   MCHC             

                                        32.7 g/dL                   2012  

              

 

                    COMPLETE BLOOD COUNT                   03970                

   PLT              

                                        184 10e9/L                   2012 

               

 

                    COMPLETE BLOOD COUNT                   91964                

   MPV              

                                        10.9 fL                   2012    

            

 

                    COMPLETE BLOOD COUNT                   71401                

   ARIK %            

                                        41.5 %                   2012     

           

 

                    COMPLETE BLOOD COUNT                   34286                

   LY %             

                                        45.7 %                   2012     

           

 

                    COMPLETE BLOOD COUNT                   90430                

   MON %            

                                        9.4 %                   2012      

          

 

                    COMPLETE BLOOD COUNT                   88341                

   EOS  %           

                                        3.0 %                   2012      

          

 

                    COMPLETE BLOOD COUNT                   20480                

   BASO %           

                                        0.4 %                   2012      

          

 

                    COMPLETE BLOOD COUNT                   79121                

   RDW              

                                        13.2 %                   2012     

           

 

                    COMPLETE BLOOD COUNT                   24521                

   ABS ARIK          

                                        2.78 10e9/L                   2012

             

  

 

                    COMPLETE BLOOD COUNT                   51722                

   ABS LYMPH        

                                        3.06 10e9/L                   2012

           

    

 

                    COMPLETE BLOOD COUNT                   63924                

   ABS MONO         

                                        0.63 10e9/L                   2012

            

   

 

                    COMPLETE BLOOD COUNT                   92393                

   ABS EOS          

                                        0.20 10e9/L                   2012

             

  

 

                    COMPLETE BLOOD COUNT                   86818                

   ABS BASO         

                                        0.03 10e9/L                   2012

            

   

 

                    COMPLETE BLOOD COUNT                   33327                

   RDW-SD           

                                        42.3 fL                   2012    

            

 

                GFR CALC                   [id number]                   GFR AA     

                

                          57.0L ML/MIN                   2012             

   

 

                    GFR CALC                   9243578                   GFR NON

-AA                 

                                        47.0L ML/MIN                   

2                

 

                    THYROID STIMULATING HORMONE                   71126         

          TSH       

                                        2.663 uIU/ML                   

2         

      

 

                FREE T4                   39178                   FREE T4       

                

                          1.15 NG/DL                   2012               

 

 

                    THYROID STIMULATING HORMONE                   98189         

          TSH       

                                        1.908 uIU/ML                   

1         

      

 

                    COMPLETE BLOOD COUNT                   20814                

   WBC              

                                        6.4 10e9/L                   2011 

               

 

                    COMPLETE BLOOD COUNT                   34724                

   RBC              

                                        3.92 10e12/L                   

1                

 

                    COMPLETE BLOOD COUNT                   40025                

   HGB              

                                        11.8 g/dL                   2011  

              

 

                    COMPLETE BLOOD COUNT                   03583                

   HCT DET          

                                        36.0 %                   2011     

           

 

                    COMPLETE BLOOD COUNT                   60010                

   MCV              

                                        91.8 fL                   2011    

            

 

                    COMPLETE BLOOD COUNT                   34298                

   MCH              

                                        30.1 pg                   2011    

            

 

                    COMPLETE BLOOD COUNT                   64688                

   MCHC             

                                        32.8 g/dL                   2011  

              

 

                    COMPLETE BLOOD COUNT                   09532                

   PLT              

                                        176 10e9/L                   2011 

               

 

                    COMPLETE BLOOD COUNT                   02774                

   MPV              

                                        11.4 fL                   2011    

            

 

                    COMPLETE BLOOD COUNT                   87469                

   ARIK %            

                                        50.4 %                   2011     

           

 

                    COMPLETE BLOOD COUNT                   04406                

   LY %             

                                        35.5 %                   2011     

           

 

                    COMPLETE BLOOD COUNT                   90567                

   MON %            

                                        10.2 %                   2011     

           

 

                    COMPLETE BLOOD COUNT                   10304                

   EOS  %           

                                        3.3 %                   2011      

          

 

                    COMPLETE BLOOD COUNT                   42498                

   BASO %           

                                        0.6 %                   2011      

          

 

                    COMPLETE BLOOD COUNT                   79535                

   RDW              

                                        13.7 %                   2011     

           

 

                    COMPLETE BLOOD COUNT                   31898                

   ABS ARIK          

                                        3.23 10e9/L                   2011

             

  

 

                    COMPLETE BLOOD COUNT                   02132                

   ABS LYMPH        

                                        2.27 10e9/L                   2011

           

    

 

                    COMPLETE BLOOD COUNT                   26942                

   ABS MONO         

                                        0.65 10e9/L                   2011

            

   

 

                    COMPLETE BLOOD COUNT                   11905                

   ABS EOS          

                                        0.21 10e9/L                   2011

             

  

 

                    COMPLETE BLOOD COUNT                   21382                

   ABS BASO         

                                        0.04 10e9/L                   2011

            

   

 

                    COMPLETE BLOOD COUNT                   88917                

   RDW-SD           

                                        45.3 fL                   2011    

            

 

                GFR CALC                   [id number]                   GFR AA     

                

                          >60 ML/MIN                   2011               

 

 

                    GFR CALC                   7871621                   GFR NON

-AA                 

                                        53.0L ML/MIN                   

1                

 

                FREE T4                   82159                   FREE T4       

                

                          1.20 NG/DL                   2011               

 

 

                    COMPREHENSIVE METABOLIC                   59560             

      AST           

                                        17 U/L                   2011     

           

 

                    COMPREHENSIVE METABOLIC                   16932             

      ALT           

                                        9 IU/L                   2011     

           

 

                    COMPREHENSIVE METABOLIC                   92040             

      BUN           

                                        16 MG/DL                   2011   

             

 

                    COMPREHENSIVE METABOLIC                   55283             

      ALBUMIN       

                                        4.0 GM/DL                   2011  

          

   

 

                    COMPREHENSIVE METABOLIC                   38957             

      CHLORIDE      

                                        108 MMOL/L                   2011 

         

     

 

                    COMPREHENSIVE METABOLIC                   51131             

      BILI TOT      

                                        0.5 MG/DL                   2011  

         

    

 

                    COMPREHENSIVE METABOLIC                   56555             

      ALK PHOS      

                                        76 U/L                   2011     

         

 

 

                    COMPREHENSIVE METABOLIC                   01186             

      SODIUM        

                                        139 MMOL/L                   2011 

           

   

 

                    COMPREHENSIVE METABOLIC                   24788             

      CREATININE    

                                        1.02 MG/DL                   2011 

       

       

 

                    COMPREHENSIVE METABOLIC                   38358             

      CALCIUM       

                                        9.2 MG/DL                   2011  

          

   

 

                    COMPREHENSIVE METABOLIC                   47186             

      POTASSIUM     

                                        4.5 MMOL/L                   2011 

        

      

 

                    COMPREHENSIVE METABOLIC                   32697             

      PROT TOT      

                                        6.1 GM/DL                   2011  

         

    

 

                    COMPREHENSIVE METABOLIC                   06749             

      Glucose       

                                        93 MG/DL                   2011   

          

  

 

                    COMPREHENSIVE METABOLIC                   79512             

      BICARB        

                                        26 MMOL/L                   2011  

           

  

 

                    COMPREHENSIVE METABOLIC                   03912             

      ANION GAP     

                                        5 MEQ/L                   2011    

        

   

 

                    LIPID GROUP                   [id number]                   HDL TE

ST                  

                                        46 MG/DL                   2011   

             

 

                LIPID GROUP                   [id number]                   TRIG      

                

                          102 MG/DL                   2011                

 

                    LIPID GROUP                   [id number]                   TEST L

DL                  

                                        88 MG/DL                   2011   

             

 

                LIPID GROUP                   [id number]                   CHOL      

                

                          154 MG/DL                   2011                

 

                    LIPID GROUP                   [id number]                   RCHOL/

HDL                 

                                        3.35 RATIO                   2011 

               



                                                                                
                                                                                
                                                                                
                                                                                
                                                                                
                                                                                
                                                                                
                                                                                
                                                                                
                                                                                
                                                          



Review of Systems

                      



                    System                   Result                   Effective 

Dates               



 

                    Constitutional                   No fatigue                 

  2019        

       

 

                    Constitutional                   No fever                   

2019          

     

 

                    Ears/Nose/Throat/Neck                   No dizziness        

           

2019                

 

                    Ears/Nose/Throat/Neck                   No headache         

          2019

               

 

                    Ears/Nose/Throat/Neck                   No nasal discharge  

                 

2019                

 

                    Ears/Nose/Throat/Neck                   No otalgia          

         2019 

              

 

                    Ears/Nose/Throat/Neck                   No sinus congestion 

                  

2019                

 

                    Ears/Nose/Throat/Neck                   No sinusitis        

           

2019                

 

                    Ears/Nose/Throat/Neck                   No postnasal drip   

                

2019                

 

                    Ears/Nose/Throat/Neck                   No otorrhea         

          2019

               

 

                    Ears/Nose/Throat/Neck                   No sore throat      

             

2019                

 

                    Ears/Nose/Throat/Neck                   hearing loss        

           

2019                

 

                    Respiratory                   No cough                   2019             

  

 

                    Respiratory                   No dyspnea                   0

2019           

    

 

                    Gastrointestinal                   No abdominal pain        

           

2019                

 

                    Gastrointestinal                   No constipation          

         2019 

              

 

                    Gastrointestinal                   No diarrhea              

     2019     

          

 

                    Dermatologic                   No rash                   2019             

  

 

                    Dermatologic                   No sores                               

   

 

                    Musculoskeletal                   No myalgias               

    2019      

         

 

                    Cardiovascular                   arrhythmia                 

  06/10/2019        

       

 

                    Cardiovascular                   No chest pain/pressure     

              

06/10/2019                

 

                    Cardiovascular                   No edema                   

06/10/2019          

     

 

                    Cardiovascular                   No exercise intolerance    

               

06/10/2019                

 

                    Cardiovascular                   No orthopnea               

    06/10/2019      

         

 

                    Cardiovascular                   No palpitations            

       06/10/2019   

            

 

                    Cardiovascular                   hypertension               

    06/10/2019      

         

 

                    Gastrointestinal                   gastroesophageal reflux  

                 

06/10/2019                

 

                    Respiratory                   No asthma                   06

/10/2019            

   

 

                    Respiratory                   No cough                   06/

10/2019             

  

 

                    Respiratory                   No dyspnea                   0

6/10/2019           

    

 

                    Respiratory                   No pleuritic pain             

      06/10/2019    

           

 

                    Respiratory                   No productive sputum          

         06/10/2019 

              

 

                    Respiratory                   No wheezing                   

06/10/2019          

     

 

                    Musculoskeletal                   back pain                 

  06/10/2019        

       

 

                    Constitutional                   fatigue                   0

6/10/2019           

    

 

                    Gastrointestinal                   No hemorrhoids           

        2019  

             

 

                    Gastrointestinal                   No hepatitis             

      2019    

           

 

                    Gastrointestinal                   No abdominal pain        

           

2019                

 

                    Gastrointestinal                   No constipation          

         2019 

              

 

                    Gastrointestinal                   No diarrhea              

     2019     

          

 

                    Gastrointestinal                   No gastroesophageal reflu

x                   

2019                

 

                    Gastrointestinal                   No melena                

   2019       

        

 

                    Gastrointestinal                   No nausea                

   2019       

        

 

                    Gastrointestinal                   No vomiting              

     2019     

          

 

                    Cardiovascular                   hypertension               

    2019      

         

 

                    Cardiovascular                   palpitations               

    2019      

         

 

                    Respiratory                   No asthma                               

   

 

                    Respiratory                   No cough                   2019             

  

 

                    Respiratory                   No dyspnea                   0

3/06/2019           

    

 

                    Respiratory                   No pleuritic pain             

      2019    

           

 

                    Respiratory                   No productive sputum          

         2019 

              

 

                    Respiratory                   No wheezing                   

2019          

     

 

                          Genitourinary/Nephrology                   No urinary 

retention/hesitancy       

                                        2019                

 

                    Gastrointestinal                   abdominal pain           

        2018  

             

 

                    Gastrointestinal                   dyspepsia                

   2018       

        

 

                    Gastrointestinal                   gastroesophageal reflux  

                 

2018                

 

                    Ears/Nose/Throat/Neck                   No hearing loss     

              

2018                

 

                    Ears/Nose/Throat/Neck                   No nasal discharge  

                 

2018                

 

                    Ears/Nose/Throat/Neck                   No sinus congestion 

                  

2018                

 

                    Ears/Nose/Throat/Neck                   No sore throat      

             

2018                

 

                    Cardiovascular                   No arrhythmia              

     2018     

          

 

                    Cardiovascular                   No chest pain/pressure     

              

2018                

 

                    Cardiovascular                   No edema                   

2018          

     

 

                    Cardiovascular                   No exercise intolerance    

               

2018                

 

                    Cardiovascular                   No orthopnea               

    2018      

         

 

                    Cardiovascular                   No palpitations            

       2018   

            

 

                    Cardiovascular                   hypertension               

    2018      

         

 

                    Respiratory                   No asthma                               

   

 

                    Respiratory                   No cough                   2018             

  

 

                    Respiratory                   No dyspnea                   1

2018           

    

 

                    Respiratory                   No pleuritic pain             

      2018    

           

 

                    Respiratory                   No productive sputum          

         2018 

              

 

                    Respiratory                   No wheezing                   

2018          

     

 

                    Gastrointestinal                   No hemorrhoids           

        2018  

             

 

                    Gastrointestinal                   No hepatitis             

      2018    

           

 

                    Gastrointestinal                   No abdominal pain        

           

2018                

 

                    Gastrointestinal                   No constipation          

         2018 

              

 

                    Gastrointestinal                   No diarrhea              

     2018     

          

 

                    Gastrointestinal                   No gastroesophageal reflu

x                   

2018                

 

                    Gastrointestinal                   No melena                

   2018       

        

 

                    Gastrointestinal                   No nausea                

   2018       

        

 

                    Gastrointestinal                   No vomiting              

     2018     

          

 

                    Genitourinary/Nephrology                   No dysuria       

            

2018                

 

                    Genitourinary/Nephrology                   No nocturia      

             

2018                

 

                          Genitourinary/Nephrology                   No urinary 

incontinence              

                                        2018                

 

                    Musculoskeletal                   No muscle weakness        

           

2018                

 

                    Musculoskeletal                   No myalgias               

    2018      

         

 

                    Musculoskeletal                   No stiffness              

     2018     

          

 

                    Musculoskeletal                   No swelling               

    2018      

         

 

                    Dermatologic                   No rash                   2018             

  

 

                    Dermatologic                   No scar                   2018             

  

 

                    Neurologic                   No dizziness                   

2018          

     

 

                    Neurologic                   No headache                   1

2018           

    

 

                    Neurologic                   No neck pain                   

2018          

     

 

                    Neurologic                   No syncope                               

   

 

                    Psychiatric                   anxiety                   2018              

 

 

                    Psychiatric                   No depression                 

  2018        

       

 

                    Endocrine                   No goiter                   2018              

 

 

                    Endocrine                   No hyperglycemia                

   2018       

        

 

                    Endocrine                   No hypoglycemia                 

  2018        

       

 

                    Endocrine                   hyperlipidemia                  

 2018         

      

 

                    Psychiatric                   stress                                  



 

                    Constitutional                   fatigue                   1

2018           

    

 

                    Gastrointestinal                   gastroesophageal reflux  

                 

10/02/2018                

 

                    Gastrointestinal                   abdominal pain           

        10/02/2018  

             

 

                    Cardiovascular                   fatigue                   1

           

    

 

                    Constitutional                   fatigue                   1

           

    

 

                    Cardiovascular                   palpitations               

    10/02/2018      

         

 

                    Cardiovascular                   arrhythmia                 

  10/02/2018        

       

 

                    Respiratory                   No asthma                   10

/2018            

   

 

                    Respiratory                   No cough                   10/

2018             

  

 

                    Respiratory                   No dyspnea                   1

           

    

 

                    Respiratory                   No pleuritic pain             

      10/02/2018    

           

 

                    Respiratory                   No productive sputum          

         10/02/2018 

              

 

                    Respiratory                   No wheezing                   

10/02/2018          

     

 

                    Musculoskeletal                   No muscle weakness        

           

10/02/2018                

 

                    Musculoskeletal                   No myalgias               

    10/02/2018      

         

 

                    Musculoskeletal                   No stiffness              

     10/02/2018     

          

 

                    Musculoskeletal                   No swelling               

    10/02/2018      

         

 

                    Neurologic                   No dizziness                   

10/02/2018          

     

 

                    Neurologic                   No headache                   1

           

    

 

                    Neurologic                   No neck pain                   

10/02/2018          

     

 

                    Neurologic                   No syncope                   10

/2018            

   

 

                    Psychiatric                   anxiety                   10/0

2018              

 

 

                    Psychiatric                   No depression                 

  10/02/2018        

       

 

                    Psychiatric                   stress                   10/02

/2018               



 

                    Gastrointestinal                   gastroesophageal reflux  

                 

2018                

 

                    Gastrointestinal                   abdominal pain           

        2018  

             

 

                    Respiratory                   No asthma                               

   

 

                    Respiratory                   No cough                                

  

 

                    Respiratory                   No dyspnea                   0

2018           

    

 

                    Respiratory                   No pleuritic pain             

      2018    

           

 

                    Respiratory                   No productive sputum          

         2018 

              

 

                    Respiratory                   No wheezing                   

2018          

     

 

                    Cardiovascular                   chest pain/pressure        

           

2018                

 

                    Psychiatric                   anxiety                                 

 

 

                    Musculoskeletal                   No muscle weakness        

           

2018                

 

                    Musculoskeletal                   No myalgias               

    2018      

         

 

                    Musculoskeletal                   No stiffness              

     2018     

          

 

                    Musculoskeletal                   No swelling               

    2018      

         

 

                    Psychiatric                   stress                                  



 

                    Constitutional                   fatigue                   0

2018           

    

 

                    Cardiovascular                   hypertension               

    2018      

         

 

                    Psychiatric                   anxiety                                 

 

 

                    Psychiatric                   stress                                  



 

                    Neurologic                   headache                                 

 

 

                    Neurologic                   dizziness                                

  

 

                    Constitutional                   fatigue                   0

2018           

    

 

                    Neurologic                   dizziness                                

  

 

                    Cardiovascular                   hypertension               

    2018      

         

 

                    Cardiovascular                   arrhythmia                 

  2018        

       

 

                    Constitutional                   No chills                  

 2018         

      

 

                    Constitutional                   No fever                   

2018          

     

 

                    Constitutional                   No malaise                 

  2018        

       

 

                    Musculoskeletal                   No muscle weakness        

           

2018                

 

                    Musculoskeletal                   No myalgias               

    2018      

         

 

                    Musculoskeletal                   No stiffness              

     2018     

          

 

                    Musculoskeletal                   No swelling               

    2018      

         

 

                    Neurologic                   No dizziness                   

2018          

     

 

                    Neurologic                   No headache                   0

2018           

    

 

                    Neurologic                   No neck pain                   

2018          

     

 

                    Neurologic                   No syncope                               

   

 

                    Constitutional                   No fever                   

2018          

     

 

                    Constitutional                   No fatigue                 

  2018        

       

 

                    Ears/Nose/Throat/Neck                   otalgia             

      2018    

           

 

                    Ears/Nose/Throat/Neck                   No postnasal drip   

                

2018                

 

                    Ears/Nose/Throat/Neck                   No otorrhea         

          2018

               

 

                    Ears/Nose/Throat/Neck                   No sore throat      

             

2018                

 

                    Ears/Nose/Throat/Neck                   No sinusitis        

           

2018                

 

                    Ears/Nose/Throat/Neck                   No sinus congestion 

                  

2018                

 

                    Respiratory                   No cough                                

  

 

                    Gastrointestinal                   No abdominal pain        

           

2018                

 

                    Gastrointestinal                   No constipation          

         2018 

              

 

                    Neurologic                   No headache                   0

3/26/2018           

    

 

                    Neurologic                   dizziness                                

  

 

                    Neurologic                   No tinnitus                   0

3/26/2018           

    

 

                    Neurologic                   vertigo                                  



 

                    Musculoskeletal                   back pain                 

  2018        

       

 

                    Musculoskeletal                   joint complaint           

        2018  

             

 

                    Gastrointestinal                   abdominal pain           

        2018  

             

 

                    Musculoskeletal                   No muscle weakness        

           

2017                

 

                    Musculoskeletal                   No myalgias               

    2017      

         

 

                    Musculoskeletal                   No stiffness              

     2017     

          

 

                    Musculoskeletal                   No swelling               

    2017      

         

 

                    Musculoskeletal                   low back pain             

      2017    

           

 

                    Musculoskeletal                   thoracic back pain        

           

2017                

 

                    Neurologic                   gait abnormality               

    2017      

         

 

                    Musculoskeletal                   low back pain             

      2017    

           

 

                    Gastrointestinal                   abdominal pain           

        2017  

             

 

                    Constitutional                   No night sweats            

       2017   

            

 

                    Constitutional                   No chills                  

 2017         

      

 

                    Constitutional                   No fatigue                 

  2017        

       

 

                    Constitutional                   No fever                   

2017          

     

 

                    Eyes                   No eye discharge                               

   

 

                    Eyes                   No eye pain                   

017                

 

                    Eyes                   No vision change                               

   

 

                    Ears/Nose/Throat/Neck                   No dizziness        

           

2017                

 

                          Ears/Nose/Throat/Neck                   eustachian tub

e dysfunction             

                                        2017                

 

                    Ears/Nose/Throat/Neck                   No headache         

          2017

               

 

                    Ears/Nose/Throat/Neck                   nasal discharge     

              

2017                

 

                    Ears/Nose/Throat/Neck                   postnasal drip      

             

2017                

 

                    Ears/Nose/Throat/Neck                   sinus congestion    

               

2017                

 

                    Ears/Nose/Throat/Neck                   sore throat         

          2017

               

 

                    Cardiovascular                   No chest pain/pressure     

              

2017                

 

                    Cardiovascular                   No dyspnea                 

  2017        

       

 

                    Cardiovascular                   No orthopnea               

    2017      

         

 

                    Cardiovascular                   No palpitations            

       2017   

            

 

                    Cardiovascular                   No syncope                 

  2017        

       

 

                    Respiratory                   No chest tightness            

       2017   

            

 

                    Respiratory                   cough                   2017                

 

                    Respiratory                   No dyspnea                   0

2017           

    

 

                    Respiratory                   No wheezing                   

2017          

     

 

                    Gastrointestinal                   No diarrhea              

     2017     

          

 

                    Gastrointestinal                   No nausea                

   2017       

        

 

                    Gastrointestinal                   No vomiting              

     2017     

          

 

                          Hematologic/Lymphatic                   No lymph node 

enlargement/mass          

                                        2017                

 

                    Ears/Nose/Throat/Neck                   No facial pain      

             

2017                

 

                    Respiratory                   No chest congestion           

        2017  

             

 

                    Dermatologic                   No rash                   2017             

  

 

                    Dermatologic                   No scar                   2017             

  

 

                    Constitutional                   fatigue                   1

2016           

    

 

                    Constitutional                   fever                   2016             

  

 

                    Ears/Nose/Throat/Neck                   otalgia             

      2016    

           

 

                    Ears/Nose/Throat/Neck                   No sinus congestion 

                  

2016                

 

                    Ears/Nose/Throat/Neck                   No sinusitis        

           

2016                

 

                    Respiratory                   No asthma                               

   

 

                    Respiratory                   No cough                   2016             

  

 

                    Respiratory                   No dyspnea                   1

2016           

    

 

                    Respiratory                   No pleuritic pain             

      2016    

           

 

                    Respiratory                   No productive sputum          

         2016 

              

 

                    Respiratory                   No wheezing                   

2016          

     

 

                    Dermatologic                   No rash                   2016             

  

 

                    Dermatologic                   No scar                   2016             

  

 

                    Gastrointestinal                   No hemorrhoids           

        2016  

             

 

                    Gastrointestinal                   No hepatitis             

      2016    

           

 

                    Gastrointestinal                   No abdominal pain        

           

2016                

 

                    Gastrointestinal                   constipation             

      2016    

           

 

                    Gastrointestinal                   No diarrhea              

     2016     

          

 

                    Gastrointestinal                   No gastroesophageal reflu

x                   

2016                

 

                    Gastrointestinal                   No melena                

   2016       

        

 

                    Gastrointestinal                   No nausea                

   2016       

        

 

                    Gastrointestinal                   No vomiting              

     2016     

          

 

                    Cardiovascular                   No arrhythmia              

     2016     

          

 

                    Cardiovascular                   No chest pain/pressure     

              

2016                

 

                    Cardiovascular                   No edema                   

2016          

     

 

                    Cardiovascular                   No exercise intolerance    

               

2016                

 

                    Cardiovascular                   No orthopnea               

    2016      

         

 

                    Cardiovascular                   No palpitations            

       2016   

            

 

                    Cardiovascular                   hypertension               

    2016      

         

 

                    Respiratory                   No asthma                               

   

 

                    Respiratory                   No cough                                

  

 

                    Respiratory                   No dyspnea                   0

3/16/2016           

    

 

                    Respiratory                   No pleuritic pain             

      2016    

           

 

                    Respiratory                   No productive sputum          

         2016 

              

 

                    Respiratory                   No wheezing                   

2016          

     

 

                    Gastrointestinal                   No hemorrhoids           

        2016  

             

 

                    Gastrointestinal                   No hepatitis             

      2016    

           

 

                    Gastrointestinal                   No abdominal pain        

           

2016                

 

                    Gastrointestinal                   No constipation          

         2016 

              

 

                    Gastrointestinal                   No diarrhea              

     2016     

          

 

                    Gastrointestinal                   No gastroesophageal reflu

x                   

2016                

 

                    Gastrointestinal                   No melena                

   2016       

        

 

                    Gastrointestinal                   No nausea                

   2016       

        

 

                    Gastrointestinal                   No vomiting              

     2016     

          

 

                    Genitourinary/Nephrology                   No dysuria       

            

2016                

 

                    Genitourinary/Nephrology                   No nocturia      

             

2016                

 

                          Genitourinary/Nephrology                   No urinary 

incontinence              

                                        2016                

 

                    Musculoskeletal                   No muscle weakness        

           

2016                

 

                    Musculoskeletal                   No myalgias               

    2016      

         

 

                    Musculoskeletal                   No stiffness              

     2016     

          

 

                    Musculoskeletal                   No swelling               

    2016      

         

 

                    Dermatologic                   No rash                                

  

 

                    Dermatologic                   No scar                                

  

 

                    Neurologic                   No dizziness                   

2016          

     

 

                    Neurologic                   No headache                   0

3/16/2016           

    

 

                    Neurologic                   No neck pain                   

2016          

     

 

                    Neurologic                   No syncope                               

   

 

                    Psychiatric                   No anxiety                   0

3/16/2016           

    

 

                    Psychiatric                   No depression                 

  2016        

       

 

                    Endocrine                   No goiter                   03/1

2016              

 

 

                    Endocrine                   No hyperglycemia                

   2016       

        

 

                    Endocrine                   No hypoglycemia                 

  2016        

       

 

                    Ears/Nose/Throat/Neck                   No hearing loss     

              

2016                

 

                    Ears/Nose/Throat/Neck                   No nasal discharge  

                 

2016                

 

                    Ears/Nose/Throat/Neck                   No sinus congestion 

                  

2016                

 

                    Ears/Nose/Throat/Neck                   No sore throat      

             

2016                

 

                    Constitutional                   insomnia                   

2016          

     

 

                    Constitutional                   fatigue                   1

2015           

    

 

                    Constitutional                   fever                                

  

 

                    Ears/Nose/Throat/Neck                   otalgia             

      2015    

           

 

                    Ears/Nose/Throat/Neck                   No sinus congestion 

                  

2015                

 

                    Ears/Nose/Throat/Neck                   No sinusitis        

           

2015                

 

                    Respiratory                   No asthma                               

   

 

                    Respiratory                   No cough                                

  

 

                    Respiratory                   No dyspnea                   1

2015           

    

 

                    Respiratory                   No pleuritic pain             

      2015    

           

 

                    Respiratory                   No productive sputum          

         2015 

              

 

                    Respiratory                   No wheezing                   

2015          

     

 

                    Dermatologic                   No rash                                

  

 

                    Dermatologic                   No scar                                

  

 

                    Neurologic                   dizziness                                

  

 

                    Psychiatric                   No anxiety                   1

2015           

    

 

                    Psychiatric                   No depression                 

  2015        

       

 

                    Endocrine                   No goiter                                 

 

 

                    Endocrine                   No hyperglycemia                

   2015       

        

 

                    Endocrine                   No hypoglycemia                 

  2015        

       

 

                    Musculoskeletal                   No muscle weakness        

           

2015                

 

                    Musculoskeletal                   No myalgias               

    2015      

         

 

                    Musculoskeletal                   No stiffness              

     2015     

          

 

                    Musculoskeletal                   No swelling               

    2015      

         

 

                    Genitourinary/Nephrology                   No dysuria       

            

2015                

 

                    Genitourinary/Nephrology                   No nocturia      

             

2015                

 

                          Genitourinary/Nephrology                   No urinary 

incontinence              

                                        2015                

 

                    Gastrointestinal                   No hemorrhoids           

        2015  

             

 

                    Gastrointestinal                   No hepatitis             

      2015    

           

 

                    Gastrointestinal                   No abdominal pain        

           

2015                

 

                    Gastrointestinal                   No constipation          

         2015 

              

 

                    Gastrointestinal                   No diarrhea              

     2015     

          

 

                    Gastrointestinal                   No gastroesophageal reflu

x                   

2015                

 

                    Gastrointestinal                   No melena                

   2015       

        

 

                    Gastrointestinal                   No nausea                

   2015       

        

 

                    Gastrointestinal                   No vomiting              

     2015     

          

 

                    Cardiovascular                   No arrhythmia              

     2015     

          

 

                    Cardiovascular                   No chest pain/pressure     

              

2015                

 

                    Cardiovascular                   edema                                

  

 

                    Cardiovascular                   No exercise intolerance    

               

2015                

 

                    Cardiovascular                   No orthopnea               

    2015      

         

 

                    Cardiovascular                   No palpitations            

       2015   

            

 

                    Ears/Nose/Throat/Neck                   No hearing loss     

              

2015                

 

                    Ears/Nose/Throat/Neck                   No nasal discharge  

                 

2015                

 

                    Ears/Nose/Throat/Neck                   No sore throat      

             

2015                

 

                    Ears/Nose/Throat/Neck                   dizziness           

        2015  

             

 

                    Cardiovascular                   hypertension               

    2015      

         

 

                    Musculoskeletal                   arthralgia(s)             

      2015    

           

 

                    Musculoskeletal                   low back pain             

      2015    

           

 

                    Musculoskeletal                   back pain                 

  2015        

       

 

                    Neurologic                   vertigo                                  



 

                    Psychiatric                   stress                                  



 

                    Endocrine                   hyperlipidemia                  

 2015         

      

 

                          Hematologic/Lymphatic                   No abnormal ec

chymoses                  

                                        2015                

 

                    Hematologic/Lymphatic                   No petechiae        

           

2015                

 

                          Hematologic/Lymphatic                   No abnormal bl

eeding and bruising       

                                        2015                

 

                    Hematologic/Lymphatic                   No anemia           

        2015  

             

 

                          Hematologic/Lymphatic                   No lymph node 

enlargement/mass          

                                        2015                

 

                    Constitutional                   fatigue                   0

2015           

    

 

                    Psychiatric                   stress                                  



 

                    Psychiatric                   depression                   0

2015           

    

 

                    Ears/Nose/Throat/Neck                   otalgia             

      2015    

           

 

                    Ears/Nose/Throat/Neck                   No sore throat      

             

2015                

 

                    Ears/Nose/Throat/Neck                   No sinus congestion 

                  

2015                

 

                    Constitutional                   No fever                   

2015          

     

 

                    Respiratory                   No cough                                

  

 

                    Neurologic                   pain, limb                               

   

 

                    Cardiovascular                   chest pain/pressure        

           

2015                

 

                    Musculoskeletal                   back pain                 

  2015        

       

 

                    Musculoskeletal                   muscle spasm              

     2015     

          

 

                    Neurologic                   dizziness                                

  

 

                    Cardiovascular                   hypertension               

    2015      

         

 

                    Dermatologic                   skin lesion                  

 2015         

      

 

                    Ears/Nose/Throat/Neck                   dizziness           

        2014  

             

 

                    Ears/Nose/Throat/Neck                   facial pain         

          2014

               

 

                    Ears/Nose/Throat/Neck                   sinusitis           

        2014  

             

 

                    Ears/Nose/Throat/Neck                   otalgia             

      2014    

           

 

                    Respiratory                   cough                   2014                

 

                    Constitutional                   No fever                   

2014          

     

 

                    Musculoskeletal                   back pain                 

  2014        

       

 

                    Respiratory                   cough                   2014                

 

                          Ears/Nose/Throat/Neck                   eustachian tub

e dysfunction             

                                        2014                

 

                    Ears/Nose/Throat/Neck                   sinus congestion    

               

2014                

 

                    Ears/Nose/Throat/Neck                   sinusitis           

        2014  

             

 

                    Ears/Nose/Throat/Neck                   headache            

       2014   

            

 

                    Ears/Nose/Throat/Neck                   sinusitis           

        2014  

             

 

                    Ears/Nose/Throat/Neck                   sore throat         

          2014

               

 

                    Respiratory                   cough                   2014                

 

                    Constitutional                   No fever                   

2014          

     

 

                    Constitutional                   No recent illness          

         2014 

              

 

                    Respiratory                   cough                   10/15/

2013                

 

                    Cardiovascular                   hypertension               

    10/15/2013      

         

 

                    Endocrine                   hyperlipidemia                  

 10/15/2013         

      

 

                    Endocrine                   obesity                   10/15/

2013                

 

                    Gastrointestinal                   dyspepsia                

   10/15/2013       

        

 

                    Constitutional                   No fever                   

10/15/2013          

     

 

                    Cardiovascular                   hypertension               

    2013      

         

 

                    Neurologic                   dizziness                                

  

 

                    Ears/Nose/Throat/Neck                   sinusitis           

        2013  

             

 

                    Ears/Nose/Throat/Neck                   sinus congestion    

               

2013                

 

                    Ears/Nose/Throat/Neck                   dizziness           

        2013  

             

 

                    Musculoskeletal                   back pain                 

  2013        

       

 

                    Ears/Nose/Throat/Neck                   No hearing loss     

              

2013                

 

                    Ears/Nose/Throat/Neck                   No nasal discharge  

                 

2013                

 

                    Ears/Nose/Throat/Neck                   No sinus congestion 

                  

2013                

 

                    Ears/Nose/Throat/Neck                   No sore throat      

             

2013                

 

                    Constitutional                   No chills                  

 05/10/2013         

      

 

                    Constitutional                   No anorexia                

   05/10/2013       

        

 

                    Constitutional                   recent illness             

      05/10/2013    

           

 

                    Constitutional                   No fever                   

05/10/2013          

     

 

                    Constitutional                   No fatigue                 

  05/10/2013        

       

 

                    Constitutional                   No malaise                 

  05/10/2013        

       

 

                    Constitutional                   No insomnia                

   05/10/2013       

        

 

                    Ears/Nose/Throat/Neck                   sore throat         

          05/10/2013

               

 

                    Ears/Nose/Throat/Neck                   otalgia             

      05/10/2013    

           

 

                    Ears/Nose/Throat/Neck                   sinus congestion    

               

05/10/2013                

 

                    Ears/Nose/Throat/Neck                   headache            

       05/10/2013   

            

 

                    Respiratory                   No cough                   05/

10/2013             

  

 

                    Genitourinary/Nephrology                   breast complaint 

                  

2013                

 

                    Gastrointestinal                   abdominal pain           

        2012  

             

 

                    Gastrointestinal                   dyspepsia                

   2012       

        

 

                    Gastrointestinal                   gastroesophageal reflux  

                 

2012                

 

                    Neurologic                   dizziness                                

  

 

                    Ears/Nose/Throat/Neck                   otalgia             

      2012    

           

 

                    Gastrointestinal                   No hemorrhoids           

        10/23/2012  

             

 

                    Gastrointestinal                   No hepatitis             

      10/23/2012    

           

 

                    Gastrointestinal                   abdominal pain           

        10/23/2012  

             

 

                    Gastrointestinal                   No constipation          

         10/23/2012 

              

 

                    Gastrointestinal                   No diarrhea              

     10/23/2012     

          

 

                    Gastrointestinal                   gastroesophageal reflux  

                 

10/23/2012                

 

                    Gastrointestinal                   No melena                

   10/23/2012       

        

 

                    Gastrointestinal                   No nausea                

   10/23/2012       

        

 

                    Gastrointestinal                   No vomiting              

     10/23/2012     

          

 

                    Gastrointestinal                   gas and bloating         

          10/23/2012

               

 

                    Neurologic                   dizziness                                

  

 

                    Cardiovascular                   hypertension               

    2012      

         

 

                    Endocrine                   hypoglycemia                   0

2012           

    

 

                    Constitutional                   fatigue                   0

2012           

    

 

                    Constitutional                   fatigue                   0

2012           

    

 

                    Neurologic                   dizziness                   2012             

  

 

                    Musculoskeletal                   muscle weakness           

        2012  

             

 

                    Cardiovascular                   hypertension               

    2012      

         

 

                    Endocrine                   diabetes mellitus type 2        

           

2012                

 

                    Musculoskeletal                   sciatica                  

 2012         

      

 

                    Neurologic                   pain, limb                               

   

 

                    Musculoskeletal                   back pain                 

  2012        

       

 

                    Musculoskeletal                   low back pain             

      2012    

           

 

                    Neurologic                   pain, limb                               

   

 

                    Musculoskeletal                   back pain                 

  2012        

       

 

                    Musculoskeletal                   joint complaint           

        2012  

             

 

                    Musculoskeletal                   back pain                 

  2012        

       

 

                    Musculoskeletal                   back pain                 

  2012        

       

 

                    Neurologic                   pain, limb                               

   

 

                    Neurologic                   pain, back                               

   

 

                    Constitutional                   fatigue                   0

2012           

    

 

                    Constitutional                   fatigue                   0

2012           

    

 

                    Cardiovascular                   No hypertension            

       2012   

            

 

                    Cardiovascular                   fatigue                   0

2012           

    

 

                    Cardiovascular                   No chest pain/pressure     

              

2012                

 

                    Respiratory                   No asthma                               

   

 

                    Respiratory                   No pleuritic pain             

      2012    

           

 

                    Respiratory                   No productive sputum          

         2012 

              

 

                    Respiratory                   No cough                                

  

 

                    Respiratory                   No dyspnea                   0

2012           

    

 

                    Respiratory                   No wheezing                   

2012          

     

 

                    Gastrointestinal                   No hemorrhoids           

        2012  

             

 

                    Gastrointestinal                   No hepatitis             

      2012    

           

 

                    Gastrointestinal                   No abdominal pain        

           

2012                

 

                    Gastrointestinal                   No constipation          

         2012 

              

 

                    Gastrointestinal                   No diarrhea              

     2012     

          

 

                    Gastrointestinal                   No gastroesophageal reflu

x                   

2012                

 

                    Gastrointestinal                   No melena                

   2012       

        

 

                    Gastrointestinal                   No nausea                

   2012       

        

 

                    Gastrointestinal                   No vomiting              

     2012     

          

 

                    Genitourinary/Nephrology                   No dysuria       

            

2012                

 

                    Genitourinary/Nephrology                   No nocturia      

             

2012                

 

                          Genitourinary/Nephrology                   No urinary 

incontinence              

                                        2012                

 

                    Musculoskeletal                   arthralgia(s)             

      2012    

           

 

                    Dermatologic                   No rash                                

  

 

                    Dermatologic                   No scar                                

  

 

                    Neurologic                   No dizziness                   

2012          

     

 

                    Neurologic                   No headache                   0

2012           

    

 

                    Neurologic                   No neck pain                   

2012          

     

 

                    Neurologic                   No syncope                               

   

 

                    Psychiatric                   No anxiety                   0

2012           

    

 

                    Psychiatric                   No depression                 

  2012        

       

 

                    Endocrine                   No goiter                                 

 

 

                    Endocrine                   No hyperglycemia                

   2012       

        

 

                    Endocrine                   No hypoglycemia                 

  2012        

       

 

                    Ears/Nose/Throat/Neck                   No hearing loss     

              

2011                

 

                    Ears/Nose/Throat/Neck                   No nasal discharge  

                 

2011                

 

                    Ears/Nose/Throat/Neck                   No sinus congestion 

                  

2011                

 

                    Ears/Nose/Throat/Neck                   sore throat         

          2011

               

 

                    Ears/Nose/Throat/Neck                   sinusitis           

        2011  

             

 

                    Ears/Nose/Throat/Neck                   otalgia             

      2011    

           

 

                    Neurologic                   headache                   12/0

2011              

 

 

                    Respiratory                   cough                   2011                

 

                    Musculoskeletal                   back pain                 

  2011        

       

 

                    Gastrointestinal                   abdominal pain           

        2011  

             

 

                    Neurologic                   dizziness                   2011             

  

 

                    Gastrointestinal                   No abdominal pain        

           

2011                

 

                    Gastrointestinal                   gastroesophageal reflux  

                 

2011                

 

                    Gastrointestinal                   No dyspepsia             

      2011    

           

 

                    Gastrointestinal                   No diarrhea              

     2011     

          

 

                    Gastrointestinal                   No constipation          

         2011 

              

 

                    Gastrointestinal                   No nausea                

   2011       

        

 

                    Musculoskeletal                   back pain                 

  2011        

       

 

                    Gastrointestinal                   abdominal pain           

        2011  

             

 

                    Neurologic                   dizziness                                

  

 

                    Ears/Nose/Throat/Neck                   otitis media        

           

2011                

 

                    Ears/Nose/Throat/Neck                   otalgia             

      2011    

           

 

                    Gastrointestinal                   gastroesophageal reflux  

                 

2011                

 

                    Musculoskeletal                   back pain                 

  2011        

       

 

                    Musculoskeletal                   low back pain             

      2011    

           

 

                    Musculoskeletal                   back pain                 

  2011        

       

 

                    Musculoskeletal                   low back pain             

      2011    

           

 

                    Constitutional                   fatigue                   0

2011           

    

 

                    Psychiatric                   stress                                  



 

                    Psychiatric                   depression                   0

2011           

    

 

                    Constitutional                   fever                                

  

 

                    Ears/Nose/Throat/Neck                   headache            

       2010   

            

 

                    Ears/Nose/Throat/Neck                   nasal discharge     

              

2010                

 

                    Ears/Nose/Throat/Neck                   sore throat         

          2010

               

 

                    Ears/Nose/Throat/Neck                   sinusitis           

        2010  

             

 

                    Ears/Nose/Throat/Neck                   sinus congestion    

               

2010                

 

                    Ears/Nose/Throat/Neck                   No tonsillitis      

             

2010                

 

                    Respiratory                   cough                   2010                

 

                    Respiratory                   nocturnal cough               

    2010      

         

 

                    Gastrointestinal                   No diarrhea              

     2010     

          

 

                    Gastrointestinal                   No constipation          

         2010 

              

 

                    Gastrointestinal                   nausea                   

2010          

     

 

                    Gastrointestinal                   No vomiting              

     2010     

          

 

                    Dermatologic                   No rash                                

  

 

                    Dermatologic                   No sores                               

   

 

                    Constitutional                   fatigue                   0

2010           

    

 

                    Gastrointestinal                   gastroesophageal reflux  

                 

2010                

 

                    Psychiatric                   anxiety                                 

 

 

                    Psychiatric                   stress                                  



 

                    Genitourinary/Nephrology                   urinary urgency  

                 

2010                

 

                    Constitutional                   fatigue                   0

2010           

    

 

                    Neurologic                   weakness                                 

 

 

                    Psychiatric                   anxiety                                 

 

 

                    Psychiatric                   stress                                  



 

                    Neurologic                   dyskinesia or tremor           

        2010  

             

 

                    Gastrointestinal                   constipation             

      2010    

           

 

                    Gastrointestinal                   gastroesophageal reflux  

                 

2010                

 

                    Gastrointestinal                   gas and bloating         

          2010

               

 

                    Constitutional                   fatigue                   0

2010           

    

 

                    Constitutional                   fatigue                   0

2010           

    

 

                    Psychiatric                   anxiety                   04/0

2010              

 

 

                    Psychiatric                   depression                   0

2010           

    

 

                    Musculoskeletal                   muscle weakness           

        2010  

             

 

                    Gastrointestinal                   abdominal pain           

        2010  

             

 

                    Cardiovascular                   hypertension               

    2010      

         



                                                                    



Physical Exam

                      



                    Exam Name                   System Name                   It

em Name             

                    Status                   Result                   Effective 

Dates          

                                        Notes                

 

                    Full Exam - General                   Constitutional        

            general 

appearance                   Overall:                   well nourished          

                          2019                   None                

 

                    Full Exam - General                   Constitutional        

            general 

appearance                   Overall:                   in no acute distress    

                          2019                   None                

 

                    Full Exam - General                   Ears/Nose/Throat      

             

otoscopic exam                   Left tympanic membrane:                   air-

fluid level                   2019                   None                

 

                    Full Exam - General                   Ears/Nose/Throat      

             

otoscopic exam                   Right tympanic membrane:                   air-

fluid level                   2019                   None                

 

                    Full Exam - General                   Ears/Nose/Throat      

             oral 

cavity/pharynx/larynx                    Oropharynx:                   a normal 

exam                      2019                   None                

 

                    Full Exam - General                   Respiratory           

        respiratory 

effort/rhythm                   Overall:                   no retractions       

                          2019                   None                

 

                    Full Exam - General                   Respiratory           

        respiratory 

effort/rhythm                   Overall:                   normal rate          

                          2019                   None                

 

                    Full Exam - General                   Neurologic            

       mental status

                    Overall:                   alert                   

9 

                                        None                

 

                    Full Exam - General                   Neurologic            

       mental status

                    Overall:                   oriented                   

2019                              None                

 

                    Full Exam - General                   Constitutional        

            general 

appearance                   Overall:                   well nourished          

                          06/10/2019                   None                

 

                    Full Exam - General                   Constitutional        

            general 

appearance                   Overall:                   well developed          

                          06/10/2019                   None                

 

                    Full Exam - General                   Constitutional        

            general 

appearance                   Overall:                   in no acute distress    

                          06/10/2019                   None                

 

                    Full Exam - General                   Neurologic            

       mental status

                    Overall:                   alert                   06/10/201

9 

                                        None                

 

                    Full Exam - General                   Neurologic            

       mental status

                    Overall:                   oriented                   

06/10/2019                              None                

 

                    Full Exam - General                   Psychiatric           

        mood and 

affect                    Overall:                   normal mood and affect     

 

                          06/10/2019                   None                

 

                    Full Exam - General                   Respiratory           

        auscultation

                          Overall:                   breath sounds clear bilater

ally    

                          06/10/2019                   None                

 

                    Full Exam - General                   Cardiovascular        

           

auscultation of heart                   Overall:                   regular rate 

                          06/10/2019                   None                

 

                    Full Exam - General                   Cardiovascular        

           

auscultation of heart                   Overall:                   normal heart 

sounds                    06/10/2019                   None                

 

                    Full Exam - General                   Cardiovascular        

           

auscultation of heart                   S4 (atrial gallop):                   

present                   06/10/2019                   None                

 

                    Full Exam - General                   Cardiovascular        

           

auscultation of heart                   Murmur:                   previously 

known murmur unchanged                   06/10/2019                   None      

         

 

                    Full Exam - General                   Cardiovascular        

           

extremities                   Overall:                   no clubbing            

                          06/10/2019                   None                

 

                    Full Exam - General                   Cardiovascular        

           

extremities                   Overall:                   No cyanosis            

                          06/10/2019                   None                

 

                    Full Exam - General                   Cardiovascular        

           

extremities                   Overall:                   No edema               

                          06/10/2019                   None                

 

                    Full Exam - General                   Constitutional        

            general 

appearance                   Overall:                   well nourished          

                          2019                   None                

 

                    Full Exam - General                   Constitutional        

            general 

appearance                   Overall:                   well developed          

                          2019                   None                

 

                    Full Exam - General                   Constitutional        

            general 

appearance                   Overall:                   in no acute distress    

                          2019                   None                

 

                    Full Exam - General                   Neurologic            

       mental status

                    Overall:                   alert                   

9 

                                        None                

 

                    Full Exam - General                   Neurologic            

       mental status

                    Overall:                   oriented                   

2019                              None                

 

                    Full Exam - General                   Psychiatric           

        mood and 

affect                    Overall:                   normal mood and affect     

 

                          2019                   None                

 

                    Full Exam - General                   Abdomen               

    abdominal exam  

                    Overall:                   no masses                   

2019                              None                

 

                    Full Exam - General                   Abdomen               

    abdominal exam  

                          Overall:                   normal bowel sounds        

          

                          2019                   None                

 

                    Full Exam - General                   Abdomen               

    abdominal exam  

                    Overall:                   soft                   2019

    

                                        None                

 

                    Full Exam - General                   Respiratory           

        auscultation

                          Overall:                   breath sounds clear bilater

ally    

                          2019                   None                

 

                    Full Exam - General                   Cardiovascular        

           

auscultation of heart                   Overall:                   regular rate 

                          2019                   None                

 

                    Full Exam - General                   Cardiovascular        

           

auscultation of heart                   Overall:                   normal heart 

sounds                    2019                   None                

 

                    Full Exam - General                   Cardiovascular        

           

extremities                   Overall:                   no clubbing            

                          2019                   None                

 

                    Full Exam - General                   Cardiovascular        

           

extremities                   Overall:                   No edema               

                          2019                   None                

 

                    Full Exam - General                   Cardiovascular        

           

extremities                   Overall:                   No cyanosis            

                          2019                   None                

 

                    Full Exam - General                   Constitutional        

            general 

appearance                   Overall:                   well nourished          

                          2018                   None                

 

                    Full Exam - General                   Constitutional        

            general 

appearance                   Overall:                   well developed          

                          2018                   None                

 

                    Full Exam - General                   Constitutional        

            general 

appearance                   Overall:                   in no acute distress    

                          2018                   None                

 

                    Full Exam - General                   Neurologic            

       mental status

                    Overall:                   alert                   

8 

                                        None                

 

                    Full Exam - General                   Neurologic            

       mental status

                    Overall:                   oriented                   

2018                              None                

 

                    Full Exam - General                   Psychiatric           

        mood and 

affect                    Overall:                   normal mood and affect     

 

                          2018                   None                

 

                    Full Exam - General                   Abdomen               

    abdominal exam  

                    Overall:                   no masses                   

2018                              None                

 

                    Full Exam - General                   Abdomen               

    abdominal exam  

                          Overall:                   normal bowel sounds        

          

                          2018                   None                

 

                    Full Exam - General                   Abdomen               

    abdominal exam  

                    Overall:                   soft                   2018

    

                                        None                

 

                    Full Exam - General                   Abdomen               

    abdominal exam  

                          Epigastric:                   tender to palpation     

          

                          2018                   None                

 

                    Full Exam - General                   Abdomen               

    abdominal exam  

                          Left upper quadrant:                   tender to palpa

tion      

                          2018                   None                

 

                    Full Exam - General                   Respiratory           

        auscultation

                          Overall:                   breath sounds clear bilater

ally    

                          2018                   None                

 

                    Full Exam - General                   Cardiovascular        

           

auscultation of heart                   Overall:                   regular rate 

                          2018                   None                

 

                    Full Exam - General                   Cardiovascular        

           

auscultation of heart                   Overall:                   normal heart 

sounds                    2018                   None                

 

                    Full Exam - General                   Cardiovascular        

           

auscultation of heart                   S4 (atrial gallop):                   

present                   2018                   None                

 

                    Full Exam - General                   Cardiovascular        

           

auscultation of heart                   Murmur:                   previously 

known murmur unchanged                   2018                   None      

         

 

                    Full Exam - General                   Constitutional        

            general 

appearance                   Overall:                   well nourished          

                          2018                   None                

 

                    Full Exam - General                   Constitutional        

            general 

appearance                   Overall:                   well developed          

                          2018                   None                

 

                    Full Exam - General                   Constitutional        

            general 

appearance                   Overall:                   in no acute distress    

                          2018                   None                

 

                    Full Exam - General                   Neurologic            

       mental status

                    Overall:                   alert                   

8 

                                        None                

 

                    Full Exam - General                   Neurologic            

       mental status

                    Overall:                   oriented                   

2018                              None                

 

                    Full Exam - General                   Psychiatric           

        mood and 

affect                    Overall:                   normal mood and affect     

 

                          2018                   None                

 

                    Full Exam - General                   Respiratory           

        auscultation

                          Overall:                   breath sounds clear bilater

ally    

                          2018                   None                

 

                    Full Exam - General                   Cardiovascular        

           

auscultation of heart                   Overall:                   normal heart 

sounds                    2018                   None                

 

                    Full Exam - General                   Cardiovascular        

           

auscultation of heart                   S4 (atrial gallop):                   

present                   2018                   None                

 

                    Full Exam - General                   Cardiovascular        

           

auscultation of heart                   Rate:                     bradycardia   

  

                          2018                   None                

 

                    Full Exam - General                   Cardiovascular        

           

extremities                   Overall:                   no clubbing            

                          2018                   None                

 

                    Full Exam - General                   Cardiovascular        

           

extremities                   Overall:                   No edema               

                          2018                   None                

 

                    Full Exam - General                   Cardiovascular        

           

extremities                   Overall:                   No cyanosis            

                          2018                   None                

 

                    Full Exam - General                   Neck                  

 inspection of neck 

                    Overall:                   normal size                   

2018                              None                

 

                    Full Exam - General                   Neck                  

 inspection of neck 

                    Overall:                   no masses                   

2018                              None                

 

                    Full Exam - General                   Abdomen               

    abdominal exam  

                    Overall:                   no masses                   

2018                              None                

 

                    Full Exam - General                   Abdomen               

    abdominal exam  

                          Overall:                   normal bowel sounds        

          

                          2018                   None                

 

                    Full Exam - General                   Abdomen               

    abdominal exam  

                    Overall:                   soft                   2018

    

                                        None                

 

                    Full Exam - General                   Abdomen               

    abdominal exam  

                          Epigastric:                   tender to palpation     

          

                          2018                   None                

 

                    Full Exam - General                   Constitutional        

            general 

appearance                   Overall:                   well nourished          

                          10/02/2018                   None                

 

                    Full Exam - General                   Constitutional        

            general 

appearance                   Overall:                   well developed          

                          10/02/2018                   None                

 

                    Full Exam - General                   Constitutional        

            general 

appearance                   Overall:                   in no acute distress    

                          10/02/2018                   None                

 

                    Full Exam - General                   Neurologic            

       mental status

                    Overall:                   alert                   10/02/201

8 

                                        None                

 

                    Full Exam - General                   Neurologic            

       mental status

                    Overall:                   oriented                   

10/02/2018                              None                

 

                    Full Exam - General                   Psychiatric           

        mood and 

affect                    Overall:                   normal mood and affect     

 

                          10/02/2018                   None                

 

                    Full Exam - General                   Abdomen               

    abdominal exam  

                    Overall:                   no masses                   

10/02/2018                              None                

 

                    Full Exam - General                   Abdomen               

    abdominal exam  

                          Overall:                   normal bowel sounds        

          

                          10/02/2018                   None                

 

                    Full Exam - General                   Abdomen               

    abdominal exam  

                    Overall:                   soft                   10/02/2018

    

                                        None                

 

                    Full Exam - General                   Abdomen               

    abdominal exam  

                          Epigastric:                   tender to palpation     

          

                          10/02/2018                   None                

 

                    Full Exam - General                   Respiratory           

        auscultation

                          Overall:                   breath sounds clear bilater

ally    

                          10/02/2018                   None                

 

                    Full Exam - General                   Cardiovascular        

           

auscultation of heart                   Overall:                   regular rate 

                          10/02/2018                   None                

 

                    Full Exam - General                   Cardiovascular        

           

auscultation of heart                   Overall:                   normal heart 

sounds                    10/02/2018                   None                

 

                    Full Exam - General                   Cardiovascular        

           

auscultation of heart                   Murmur:                   previously 

known murmur unchanged                   10/02/2018                   None      

         

 

                    Full Exam - General                   Cardiovascular        

           

auscultation of heart                   S4 (atrial gallop):                   

present                   10/02/2018                   None                

 

                    Full Exam - General                   Cardiovascular        

           

extremities                   Overall:                   no clubbing            

                          10/02/2018                   None                

 

                    Full Exam - General                   Cardiovascular        

           

extremities                   Overall:                   No cyanosis            

                          10/02/2018                   None                

 

                    Full Exam - General                   Cardiovascular        

           

extremities                   Edema present:                   non-pitting      

                          10/02/2018                   None                

 

                    Full Exam - General                   Constitutional        

            general 

appearance                   Overall:                   well nourished          

                          2018                   None                

 

                    Full Exam - General                   Constitutional        

            general 

appearance                   Overall:                   well developed          

                          2018                   None                

 

                    Full Exam - General                   Constitutional        

            general 

appearance                   Evidence of Distress:                   anxious    

                          2018                   None                

 

                    Full Exam - General                   Respiratory           

        auscultation

                          Overall:                   breath sounds clear bilater

ally    

                          2018                   None                

 

                    Full Exam - General                   Cardiovascular        

           

auscultation of heart                   Overall:                   regular rate 

                          2018                   None                

 

                    Full Exam - General                   Cardiovascular        

           

auscultation of heart                   Overall:                   normal heart 

sounds                    2018                   None                

 

                    Full Exam - General                   Cardiovascular        

           

auscultation of heart                   Murmur:                   previously 

known murmur unchanged                   2018                   None      

         

 

                    Full Exam - General                   Cardiovascular        

           

auscultation of heart                   S4 (atrial gallop):                   

present                   2018                   None                

 

                    Full Exam - General                   Cardiovascular        

           

extremities                   Overall:                   no clubbing            

                          2018                   None                

 

                    Full Exam - General                   Cardiovascular        

           

extremities                   Overall:                   No edema               

                          2018                   None                

 

                    Full Exam - General                   Cardiovascular        

           

extremities                   Overall:                   No cyanosis            

                          2018                   None                

 

                    Full Exam - General                   Neurologic            

       mental status

                    Overall:                   oriented                   

2018                              None                

 

                    Full Exam - General                   Neurologic            

       mental status

                    Overall:                   alert                   

8 

                                        None                

 

                    Full Exam - General                   Psychiatric           

        mood and 

affect                    Overall:                   normal mood and affect     

 

                          2018                   None                

 

                    Full Exam - General                   Abdomen               

    abdominal exam  

                    Overall:                   no masses                   

2018                              None                

 

                    Full Exam - General                   Abdomen               

    abdominal exam  

                          Overall:                   normal bowel sounds        

          

                          2018                   None                

 

                    Full Exam - General                   Abdomen               

    abdominal exam  

                    Overall:                   soft                   2018

    

                                        None                

 

                    Full Exam - General                   Abdomen               

    abdominal exam  

                          Epigastric:                   tender to palpation     

          

                          2018                   None                

 

                    Full Exam - General                   Constitutional        

            general 

appearance                   Evidence of Distress:                   tearful    

                          2018                   None                

 

                    Full Exam - General                   Constitutional        

            general 

appearance                   Overall:                   well nourished          

                          2018                   None                

 

                    Full Exam - General                   Constitutional        

            general 

appearance                   Overall:                   well developed          

                          2018                   None                

 

                    Full Exam - General                   Constitutional        

            general 

appearance                   Overall:                   in no acute distress    

                          2018                   None                

 

                    Full Exam - General                   Neurologic            

       mental status

                    Overall:                   alert                   

8 

                                        None                

 

                    Full Exam - General                   Neurologic            

       mental status

                    Overall:                   oriented                   

2018                              None                

 

                    Full Exam - General                   Respiratory           

        auscultation

                          Overall:                   breath sounds clear bilater

ally    

                          2018                   None                

 

                    Full Exam - General                   Cardiovascular        

           

auscultation of heart                   Overall:                   regular rate 

                          2018                   None                

 

                    Full Exam - General                   Cardiovascular        

           

auscultation of heart                   Overall:                   normal heart 

sounds                    2018                   None                

 

                    Full Exam - General                   Cardiovascular        

           

auscultation of heart                   S4 (atrial gallop):                   

present                   2018                   None                

 

                    Full Exam - General                   Cardiovascular        

           

auscultation of heart                   Murmur:                   previously 

known murmur unchanged                   2018                   None      

         

 

                    Full Exam - General                   Cardiovascular        

           

extremities                   Overall:                   no clubbing            

                          2018                   None                

 

                    Full Exam - General                   Cardiovascular        

           

extremities                   Overall:                   No edema               

                          2018                   None                

 

                    Full Exam - General                   Cardiovascular        

           

extremities                   Overall:                   No cyanosis            

                          2018                   None                

 

                    Full Exam - General                   Constitutional        

            general 

appearance                   Overall:                   well nourished          

                          2018                   None                

 

                    Full Exam - General                   Constitutional        

            general 

appearance                   Overall:                   in no acute distress    

                          2018                   None                

 

                    Full Exam - General                   Cardiovascular        

           

auscultation of heart                   Overall:                   regular rate 

                          2018                   None                

 

                    Full Exam - General                   Cardiovascular        

           

auscultation of heart                   Overall:                   no murmurs   

                          2018                   None                

 

                    Full Exam - General                   Respiratory           

        percussion  

                    Overall:                   benign percussion                

   

2018                              None                

 

                    Full Exam - General                   Respiratory           

        respiratory 

effort/rhythm                   Overall:                   no retractions       

                          2018                   None                

 

                    Full Exam - General                   Respiratory           

        respiratory 

effort/rhythm                   Overall:                   normal rate          

                          2018                   None                

 

                    Full Exam - General                   Respiratory           

        auscultation

                          Overall:                   breath sounds clear bilater

ally    

                          2018                   None                

 

                    Full Exam - General                   Ears/Nose/Throat      

             

otoscopic exam                   Overall:                   external auditory 

canals clear                   2018                   None                

 

                    Full Exam - General                   Ears/Nose/Throat      

             

otoscopic exam                   Overall:                   tympanic membranes 

clear                     2018                   None                

 

                    Full Exam - General                   Ears/Nose/Throat      

             oral 

cavity/pharynx/larynx                    Oropharynx:                   a normal 

exam                      2018                   None                

 

                    Full Exam - General                   Cardiovascular        

           

inspection of carotid pulses                   Overall:                   

strong, bilaterally equal, no bruits                   2018               

                                        None                

 

                    Full Exam - General                   Cardiovascular        

           palpation

of heart                   Overall:                   apical impulse location 

normal                    2018                   None                

 

                    Full Exam - General                   Cardiovascular        

           palpation

of heart                   Overall:                   no heave/lift             

                          2018                   None                

 

                    Full Exam - General                   Lymphatic             

      neck nodes    

                          Overall:                   anterior cervical chain aruna

ign         

                          2018                   None                

 

                    Full Exam - General                   Lymphatic             

      neck nodes    

                          Overall:                   posterior cervical chain be

nign        

                          2018                   None                

 

                    Full Exam - General                   Neurologic            

       deep tendon 

reflexes                   Overall:                   deep tendon reflexes 

intact                    2018                   None                

 

                    Full Exam - General                   Neurologic            

       mental status

                    Overall:                   alert                   

8 

                                        None                

 

                    Full Exam - General                   Neurologic            

       mental status

                    Overall:                   oriented                   

2018                              None                

 

                    Full Exam - General                   Neurologic            

       cranial 

nerves                    Overall:                   cranial nerves 1-12 intact 

 

                          2018                   None                

 

                    Full Exam - General                   Psychiatric           

        

orientation/consciousness                   Overall:                   oriented 

to person, place and time                   2018                   None   

            

 

                    Full Exam - General                   Psychiatric           

        

behavior/psychomotor activity                   Overall:                   no 

tics, normal psychomotor activity                   2018                  

                                        None                

 

                    Full Exam - General                   Psychiatric           

        mood and 

affect                    Overall:                   normal mood and affect     

 

                          2018                   None                

 

                    Full Exam - General                   Psychiatric           

        appearance  

                          Overall:                   well-groomed, good eye cont

act       

                          2018                   None                

 

                    Full Exam - General                   Psychiatric           

        speech      

                          Overall:                   normal quality, no aphasia 

              

                          2018                   None                

 

                    Full Exam - General                   Psychiatric           

        speech      

                          Overall:                   normal quality, quantity, r

ate           

                          2018                   None                

 

                    Full Exam - General                   Psychiatric           

        attention   

                          Overall:                   normal digit recall and num

debo 

repeating                   2018                   None                

 

                    Full Exam - General                   Constitutional        

            general 

appearance                   Overall:                   well nourished          

                          2018                   None                

 

                    Full Exam - General                   Constitutional        

            general 

appearance                   Overall:                   well developed          

                          2018                   None                

 

                    Full Exam - General                   Constitutional        

            general 

appearance                   Overall:                   in no acute distress    

                          2018                   None                

 

                    Full Exam - General                   Musculoskeletal       

            head and

neck                      Cervical Spine:                   a normal exam       

   

                          2018                   None                

 

                    Full Exam - General                   Musculoskeletal       

            head and

neck                   Head:                   atraumatic                   

2018                              None                

 

                    Full Exam - General                   Musculoskeletal       

            head and

neck                   Head:                   normocephalic                   

2018                              None                

 

                    Full Exam - General                   Musculoskeletal       

            head and

neck                      Overall:                   head atraumatic            

   

                          2018                   None                

 

                    Full Exam - General                   Musculoskeletal       

            head and

neck                      Overall:                   cervical spine benign      

   

                          2018                   None                

 

                    Full Exam - General                   Neurologic            

       mental status

                    Overall:                   alert                   

8 

                                        None                

 

                    Full Exam - General                   Neurologic            

       mental status

                    Overall:                   oriented                   

2018                              None                

 

                    Full Exam - General                   Psychiatric           

        mood and 

affect                    Overall:                   normal mood and affect     

 

                          2018                   None                

 

                    Full Exam - General                   Constitutional        

            general 

appearance                   Overall:                   well nourished          

                          2018                   None                

 

                    Full Exam - General                   Constitutional        

            general 

appearance                   Overall:                   well developed          

                          2018                   None                

 

                    Full Exam - General                   Constitutional        

            general 

appearance                   Overall:                   in no acute distress    

                          2018                   None                

 

                    Full Exam - General                   Neurologic            

       mental status

                    Overall:                   alert                   

8 

                                        None                

 

                    Full Exam - General                   Neurologic            

       mental status

                    Overall:                   oriented                   

2018                              None                

 

                    Full Exam - General                   Psychiatric           

        mood and 

affect                    Overall:                   normal mood and affect     

 

                          2018                   None                

 

                    Full Exam - General                   Respiratory           

        auscultation

                          Overall:                   breath sounds clear bilater

ally    

                          2018                   None                

 

                    Full Exam - General                   Cardiovascular        

           

auscultation of heart                   Overall:                   regular rate 

                          2018                   None                

 

                    Full Exam - General                   Cardiovascular        

           

auscultation of heart                   Overall:                   normal heart 

sounds                    2018                   None                

 

                    Full Exam - General                   Cardiovascular        

           

auscultation of heart                   S4 (atrial gallop):                   

present                   2018                   None                

 

                    Full Exam - General                   Cardiovascular        

           

auscultation of heart                   Murmur:                   previously 

known murmur unchanged                   2018                   None      

         

 

                    Full Exam - General                   Musculoskeletal       

            spine, 

ribs and pelvis                   Spine:                    tender @ cervical 

spine                     2018                   None                

 

                    Full Exam - General                   Constitutional        

            general 

appearance                   Overall:                   well nourished          

                          2018                   None                

 

                    Full Exam - General                   Constitutional        

            general 

appearance                   Overall:                   in no acute distress    

                          2018                   None                

 

                    Full Exam - General                   Cardiovascular        

           

auscultation of heart                   Overall:                   regular rate 

                          2018                   None                

 

                    Full Exam - General                   Respiratory           

        percussion  

                    Overall:                   benign percussion                

   

2018                              None                

 

                    Full Exam - General                   Respiratory           

        respiratory 

effort/rhythm                   Overall:                   no retractions       

                          2018                   None                

 

                    Full Exam - General                   Respiratory           

        respiratory 

effort/rhythm                   Overall:                   normal rate          

                          2018                   None                

 

                    Full Exam - General                   Lymphatic             

      other nodes   

                          Right auricular chain:                   tender       

           

                          2018                   None                

 

                    Full Exam - General                   Lymphatic             

      other nodes   

                    Left supraclavicular:                   tender              

     

2018                              None                

 

                    Full Exam - General                   Ears/Nose/Throat      

             

otoscopic exam                   Left tympanic membrane:                   air-

fluid level                   2018                   None                

 

                    Full Exam - General                   Ears/Nose/Throat      

             

otoscopic exam                   Right tympanic membrane:                   air-

fluid level                   2018                   None                

 

                    Full Exam - General                   Neurologic            

       mental status

                    Overall:                   alert                   

8 

                                        None                

 

                    Full Exam - General                   Neurologic            

       mental status

                    Overall:                   oriented                   

2018                              None                

 

                    Full Exam - General                   Constitutional        

            general 

appearance                   Overall:                   well nourished          

                          2018                   None                

 

                    Full Exam - General                   Constitutional        

            general 

appearance                   Overall:                   well developed          

                          2018                   None                

 

                    Full Exam - General                   Constitutional        

            general 

appearance                   Overall:                   in no acute distress    

                          2018                   None                

 

                    Full Exam - General                   Neurologic            

       mental status

                    Overall:                   alert                   

8 

                                        None                

 

                    Full Exam - General                   Neurologic            

       mental status

                    Overall:                   oriented                   

2018                              None                

 

                    Full Exam - General                   Psychiatric           

        mood and 

affect                    Overall:                   normal mood and affect     

 

                          2018                   None                

 

                    Full Exam - General                   Musculoskeletal       

            spine, 

ribs and pelvis                   Spine:                    tender @ lumbar spin

e

                          2018                   None                

 

                    Full Exam - General                   Abdomen               

    abdominal exam  

                    Overall:                   no masses                   

2018                              None                

 

                    Full Exam - General                   Abdomen               

    abdominal exam  

                          Overall:                   normal bowel sounds        

          

                          2018                   None                

 

                    Full Exam - General                   Abdomen               

    abdominal exam  

                    Overall:                   soft                   2018

    

                                        None                

 

                    Full Exam - General                   Abdomen               

    abdominal exam  

                          Left lower quadrant:                   tender to palpa

tion      

                          2018                   None                

 

                    Full Exam - General                   Constitutional        

            general 

appearance                   Overall:                   well nourished          

                          2017                   None                

 

                    Full Exam - General                   Constitutional        

            general 

appearance                   Overall:                   well developed          

                          2017                   None                

 

                    Full Exam - General                   Constitutional        

            general 

appearance                   Overall:                   in no acute distress    

                          2017                   None                

 

                    Full Exam - General                   Neurologic            

       mental status

                    Overall:                   alert                   

7 

                                        None                

 

                    Full Exam - General                   Neurologic            

       mental status

                    Overall:                   oriented                   

2017                              None                

 

                    Full Exam - General                   Psychiatric           

        mood and 

affect                    Overall:                   normal mood and affect     

 

                          2017                   None                

 

                    Full Exam - General                   Musculoskeletal       

            gait and

station                   Station:                   kyphosis                   

2017                              None                

 

                    Full Exam - General                   Constitutional        

            general 

appearance                   Overall:                   well nourished          

                          2017                   None                

 

                    Full Exam - General                   Constitutional        

            general 

appearance                   Overall:                   well developed          

                          2017                   None                

 

                    Full Exam - General                   Constitutional        

            general 

appearance                   Overall:                   in no acute distress    

                          2017                   None                

 

                    Full Exam - General                   Constitutional        

            general 

appearance                   Assistive Device:                   cane           

                          2017                   None                

 

                    Full Exam - General                   Neurologic            

       mental status

                    Overall:                   alert                   

7 

                                        None                

 

                    Full Exam - General                   Neurologic            

       mental status

                    Overall:                   oriented                   

2017                              None                

 

                    Full Exam - General                   Psychiatric           

        mood and 

affect                    Overall:                   normal mood and affect     

 

                          2017                   None                

 

                    Full Exam - General                   Abdomen               

    abdominal exam  

                    Overall:                   no masses                   

2017                              None                

 

                    Full Exam - General                   Abdomen               

    abdominal exam  

                          Overall:                   normal bowel sounds        

          

                          2017                   None                

 

                    Full Exam - General                   Abdomen               

    abdominal exam  

                    Overall:                   soft                   2017

    

                                        None                

 

                    Full Exam - General                   Abdomen               

    abdominal exam  

                          Left lower quadrant:                   tender to palpa

tion      

                          2017                   None                

 

                    Full Exam - General                   Abdomen               

    abdominal exam  

                          Right lower quadrant:                   tender to palp

ation     

                          2017                   None                

 

                    Full Exam - General                   Musculoskeletal       

            spine, 

ribs and pelvis                   Spine:                    tender @ lumbar spin

e

                          2017                   left                 

 

                    Full Exam - General                   Respiratory           

        auscultation

                          Overall:                   breath sounds clear bilater

ally    

                          2017                   None                

 

                    Full Exam - General                   Cardiovascular        

           

auscultation of heart                   Overall:                   regular rate 

                          2017                   None                

 

                    Full Exam - General                   Cardiovascular        

           

auscultation of heart                   Overall:                   normal heart 

sounds                    2017                   None                

 

                    Full Exam - General                   Cardiovascular        

           

auscultation of heart                   S4 (atrial gallop):                   

present                   2017                   None                

 

                    Full Exam - General                   Constitutional        

            general 

appearance                   Overall:                   well nourished          

                          2017                   None                

 

                    Full Exam - General                   Constitutional        

            general 

appearance                   Overall:                   well developed          

                          2017                   None                

 

                    Full Exam - General                   Constitutional        

            general 

appearance                   Overall:                   in no acute distress    

                          2017                   None                

 

                    Full Exam - General                   Neurologic            

       mental status

                    Overall:                   alert                   

7 

                                        None                

 

                    Full Exam - General                   Neurologic            

       mental status

                    Overall:                   oriented                   

2017                              None                

 

                    Full Exam - General                   Psychiatric           

        mood and 

affect                    Overall:                   normal mood and affect     

 

                          2017                   None                

 

                    Full Exam - General                   Respiratory           

        auscultation

                          Overall:                   breath sounds clear bilater

ally    

                          2017                   None                

 

                    Full Exam - General                   Cardiovascular        

           

auscultation of heart                   Overall:                   regular rate 

                          2017                   None                

 

                    Full Exam - General                   Cardiovascular        

           

auscultation of heart                   Overall:                   normal heart 

sounds                    2017                   None                

 

                    Full Exam - General                   Cardiovascular        

           

auscultation of heart                   S4 (atrial gallop):                   

present                   2017                   None                

 

                    Full Exam - General                   Cardiovascular        

           

auscultation of heart                   Murmur:                   previously 

known murmur unchanged                   2017                   None      

         

 

                    Full Exam - General                   Cardiovascular        

           

extremities                   Overall:                   no clubbing            

                          2017                   None                

 

                    Full Exam - General                   Cardiovascular        

           

extremities                   Overall:                   No cyanosis            

                          2017                   None                

 

                    Full Exam - General                   Cardiovascular        

           

extremities                   Edema present:                   non-pitting      

                          2017                   None                

 

                    Full Exam - General                   Neck                  

 inspection of neck 

                    Overall:                   normal size                   

2017                              None                

 

                    Full Exam - General                   Neck                  

 inspection of neck 

                    Overall:                   no masses                   

2017                              None                

 

                    Full Exam - General                   Chest/Breast          

         breast and 

axillae palpation                   Overall:                   no masses        

                          2017                   None                

 

                    Full Exam - General                   Chest/Breast          

         breast and 

axillae palpation                   Overall:                   axillae non-

tender                    2017                   None                

 

                    Full Exam - General                   Chest/Breast          

         breast and 

axillae palpation                   Overall:                   no nipple 

discharge                   2017                   None                

 

                    Full Exam - General                   Chest/Breast          

         breast and 

axillae palpation                       Left upper inner quadrant:              

    

                    tender                   2017                   None  

              

 

                    Full Exam - General                   Chest/Breast          

         breast and 

axillae palpation                       Left upper outer quadrant:              

    

                    tender                   2017                   None  

              

 

                    Full Exam - General                   Chest/Breast          

         breast and 

axillae palpation                       Left lower inner quadrant:              

    

                    tender                   2017                   None  

              

 

                    Full Exam - General                   Chest/Breast          

         breast and 

axillae palpation                       Left lower outer quadrant:              

    

                    tender                   2017                   None  

              

 

                    Full Exam - General                   Chest/Breast          

         breast and 

axillae palpation                       Right upper inner quadrant:             

    

                    tender                   2017                   None  

              

 

                    Full Exam - General                   Chest/Breast          

         breast and 

axillae palpation                       Right upper outer quadrant:             

    

                    tender                   2017                   None  

              

 

                    Full Exam - General                   Chest/Breast          

         breast and 

axillae palpation                       Right lower inner quadrant:             

    

                    tender                   2017                   None  

              

 

                    Full Exam - General                   Chest/Breast          

         breast and 

axillae palpation                       Right lower outer quadrant:             

    

                    tender                   2017                   None  

              

 

                    Full Exam - General                   Musculoskeletal       

            gait and

station                   Station:                   kyphosis                   

2017                              None                

 

                    Full Exam - General                   Musculoskeletal       

            spine, 

ribs and pelvis                   Chest wall palpation:                   chest 

wall pain                   2017                   bilateral costochondral

junction                

 

                    Full Exam - General                   Constitutional        

            general 

appearance                   Overall:                   well nourished          

                          2017                   None                

 

                    Full Exam - General                   Constitutional        

            general 

appearance                   Overall:                   well developed          

                          2017                   None                

 

                    Full Exam - General                   Constitutional        

            general 

appearance                   Overall:                   in no acute distress    

                          2017                   None                

 

                    Full Exam - General                   Constitutional        

            general 

appearance                   Nourishment:                   well nourished      

                          2017                   None                

 

                    Full Exam - General                   Constitutional        

            general 

appearance                   Evidence of Distress:                   in no acute

distress                   2017                   None                

 

                    Full Exam - General                   Eyes                  

 conjunctiva/eyelids

                          Overall:                   conjunctiva clear          

        

                          2017                   None                

 

                    Full Exam - General                   Eyes                  

 conjunctiva/eyelids

                    Overall:                   cornea clear                   

2017                              None                

 

                    Full Exam - General                   Eyes                  

 conjunctiva/eyelids

                    Overall:                   eyelids normal                   

2017                              None                

 

                    Full Exam - General                   Eyes                  

 pupils and irises  

                          Overall:                   pupils equal, round, reacti

ve to 

light and accomodation                   2017                   None      

         

 

                    Full Exam - General                   Ears/Nose/Throat      

             

external ear                   Overall:                   normal appearance     

                          2017                   None                

 

                    Full Exam - General                   Ears/Nose/Throat      

             

external nose                   Overall:                   benign appearance    

                          2017                   None                

 

                    Full Exam - General                   Ears/Nose/Throat      

             

otoscopic exam                   Overall:                   external auditory 

canals clear                   2017                   None                

 

                    Full Exam - General                   Ears/Nose/Throat      

             

otoscopic exam                   Left tympanic membrane:                   air-

fluid level                   2017                   None                

 

                    Full Exam - General                   Ears/Nose/Throat      

             

otoscopic exam                   Right tympanic membrane:                   air-

fluid level                   2017                   None                

 

                    Full Exam - General                   Ears/Nose/Throat      

             

internal nose                   Left nasal cavity:                   mucosal 

edema                     2017                   None                

 

                    Full Exam - General                   Ears/Nose/Throat      

             

internal nose                   Right nasal cavity:                   mucosal 

edema                     2017                   None                

 

                    Full Exam - General                   Ears/Nose/Throat      

             

lips/teeth/gingiva                   Overall:                   benign lips     

                          2017                   None                

 

                    Full Exam - General                   Ears/Nose/Throat      

             

lips/teeth/gingiva                   Overall:                   normal dentition

                          2017                   None                

 

                    Full Exam - General                   Ears/Nose/Throat      

             oral 

cavity/pharynx/larynx                    Oropharynx:                   a normal 

exam                      2017                   None                

 

                    Full Exam - General                   Respiratory           

        auscultation

                          Overall:                   breath sounds clear bilater

ally    

                          2017                   None                

 

                    Full Exam - General                   Cardiovascular        

           

auscultation of heart                   Overall:                   regular rate 

                          2017                   None                

 

                    Full Exam - General                   Cardiovascular        

           

auscultation of heart                   Overall:                   normal heart 

sounds                    2017                   None                

 

                    Full Exam - General                   Cardiovascular        

           

auscultation of heart                   Overall:                   no murmurs   

                          2017                   None                

 

                    Full Exam - General                   Integument            

       inspection of

skin                      Overall:                   no rash, lesions           

   

                          2017                   None                

 

                    Full Exam - General                   Psychiatric           

        mood and 

affect                    Overall:                   normal mood and affect     

 

                          2017                   None                

 

                    Full Exam - General                   Ears/Nose/Throat      

             

otoscopic exam                   Otorrhea:                   absent             

                          2017                   None                

 

                    Full Exam - General                   Ears/Nose/Throat      

             

otoscopic exam                   Perforation:                   absent          

                          2017                   None                

 

                    Full Exam - General                   Ears/Nose/Throat      

             

internal nose                   Overall:                   no sinus tenderness  

                          2017                   None                

 

                    Full Exam - General                   Ears/Nose/Throat      

             

internal nose                   Overall:                   no drainage          

                          2017                   None                

 

                    Full Exam - General                   Ears/Nose/Throat      

             oral 

cavity/pharynx/larynx                    Overall:                   oral mucosa 

clear                     2017                   None                

 

                    Full Exam - General                   Respiratory           

        respiratory 

effort/rhythm                   Overall:                   no retractions       

                          2017                   None                

 

                    Full Exam - General                   Respiratory           

        respiratory 

effort/rhythm                   Overall:                   normal rate          

                          2017                   None                

 

                    Full Exam - General                   Lymphatic             

      neck nodes    

                          Overall:                   shotty lymphadenopathy     

            

                          2017                   tender but no enlargement

 palpable                

 

                    Full Exam - General                   Constitutional        

            general 

appearance                   Overall:                   well nourished          

                          2016                   None                

 

                    Full Exam - General                   Constitutional        

            general 

appearance                   Overall:                   well developed          

                          2016                   None                

 

                    Full Exam - General                   Constitutional        

            general 

appearance                   Overall:                   in no acute distress    

                          2016                   None                

 

                    Full Exam - General                   Ears/Nose/Throat      

             

otoscopic exam                   Overall:                   external auditory 

canals clear                   2016                   None                

 

                    Full Exam - General                   Ears/Nose/Throat      

             

internal nose                   Drainage:                   clear               

                          2016                   None                

 

                    Full Exam - General                   Ears/Nose/Throat      

             oral 

cavity/pharynx/larynx                    Oropharynx:                   postnasal

drainage                   2016                   None                

 

                    Full Exam - General                   Respiratory           

        auscultation

                          Overall:                   breath sounds clear bilater

ally    

                          2016                   None                

 

                    Full Exam - General                   Cardiovascular        

           

auscultation of heart                   Overall:                   regular rate 

                          2016                   None                

 

                    Full Exam - General                   Cardiovascular        

           

auscultation of heart                   Overall:                   normal heart 

sounds                    2016                   None                

 

                    Full Exam - General                   Cardiovascular        

           

auscultation of heart                   Overall:                   no murmurs   

                          2016                   None                

 

                    Full Exam - General                   Neurologic            

       mental status

                    Overall:                   alert                   

6 

                                        None                

 

                    Full Exam - General                   Neurologic            

       mental status

                    Overall:                   oriented                   

2016                              None                

 

                    Full Exam - General                   Psychiatric           

        mood and 

affect                    Overall:                   normal mood and affect     

 

                          2016                   None                

 

                    Full Exam - General                   Constitutional        

            general 

appearance                   Overall:                   well nourished          

                          2016                   None                

 

                    Full Exam - General                   Constitutional        

            general 

appearance                   Overall:                   well developed          

                          2016                   None                

 

                    Full Exam - General                   Constitutional        

            general 

appearance                   Overall:                   in no acute distress    

                          2016                   None                

 

                    Full Exam - General                   Neurologic            

       mental status

                    Overall:                   alert                   

6 

                                        None                

 

                    Full Exam - General                   Neurologic            

       mental status

                    Overall:                   oriented                   

2016                              None                

 

                    Full Exam - General                   Psychiatric           

        mood and 

affect                    Overall:                   normal mood and affect     

 

                          2016                   None                

 

                    Full Exam - General                   Abdomen               

    abdominal exam  

                    Overall:                   no masses                   

2016                              None                

 

                    Full Exam - General                   Abdomen               

    abdominal exam  

                          Overall:                   normal bowel sounds        

          

                          2016                   None                

 

                    Full Exam - General                   Abdomen               

    abdominal exam  

                    Overall:                   soft                   2016

    

                                        None                

 

                    Full Exam - General                   Abdomen               

    abdominal exam  

                    Overall:                   no tenderness                   

2016                              None                

 

                    Full Exam - General                   Constitutional        

            general 

appearance                   Overall:                   well nourished          

                          2016                   None                

 

                    Full Exam - General                   Constitutional        

            general 

appearance                   Overall:                   well developed          

                          2016                   None                

 

                    Full Exam - General                   Constitutional        

            general 

appearance                   Overall:                   in no acute distress    

                          2016                   None                

 

                    Full Exam - General                   Neurologic            

       mental status

                    Overall:                   alert                   

6 

                                        None                

 

                    Full Exam - General                   Neurologic            

       mental status

                    Overall:                   oriented                   

2016                              None                

 

                    Full Exam - General                   Psychiatric           

        mood and 

affect                    Overall:                   normal mood and affect     

 

                          2016                   None                

 

                    Full Exam - General                   Respiratory           

        auscultation

                          Overall:                   breath sounds clear bilater

ally    

                          2016                   None                

 

                    Full Exam - General                   Cardiovascular        

           

auscultation of heart                   Overall:                   regular rate 

                          2016                   None                

 

                    Full Exam - General                   Cardiovascular        

           

auscultation of heart                   Overall:                   normal heart 

sounds                    2016                   None                

 

                    Full Exam - General                   Cardiovascular        

           

auscultation of heart                   S3 (ventricular gallop):                

                    present                   2016                   None 

               

 

                    Full Exam - General                   Cardiovascular        

           

auscultation of heart                   Murmur:                   previously 

known murmur unchanged                   2016                   None      

         

 

                    Full Exam - General                   Cardiovascular        

           

extremities                   Overall:                   no clubbing            

                          2016                   None                

 

                    Full Exam - General                   Cardiovascular        

           

extremities                   Overall:                   No cyanosis            

                          2016                   None                

 

                    Full Exam - General                   Cardiovascular        

           

extremities                   Overall:                   No edema               

                          2016                   None                

 

                    Full Exam - General                   Constitutional        

            general 

appearance                   Overall:                   well nourished          

                          2015                   None                

 

                    Full Exam - General                   Constitutional        

            general 

appearance                   Overall:                   well developed          

                          2015                   None                

 

                    Full Exam - General                   Constitutional        

            general 

appearance                   Overall:                   in no acute distress    

                          2015                   None                

 

                    Full Exam - General                   Ears/Nose/Throat      

             

otoscopic exam                   Overall:                   external auditory 

canals clear                   2015                   None                

 

                    Full Exam - General                   Ears/Nose/Throat      

             

internal nose                   Drainage:                   clear               

                          2015                   None                

 

                    Full Exam - General                   Ears/Nose/Throat      

             oral 

cavity/pharynx/larynx                    Oropharynx:                   postnasal

drainage                   2015                   None                

 

                    Full Exam - General                   Respiratory           

        auscultation

                          Overall:                   breath sounds clear bilater

ally    

                          2015                   None                

 

                    Full Exam - General                   Cardiovascular        

           

auscultation of heart                   Overall:                   regular rate 

                          2015                   None                

 

                    Full Exam - General                   Cardiovascular        

           

auscultation of heart                   Overall:                   normal heart 

sounds                    2015                   None                

 

                    Full Exam - General                   Cardiovascular        

           

auscultation of heart                   Overall:                   no murmurs   

                          2015                   None                

 

                    Full Exam - General                   Neurologic            

       mental status

                    Overall:                   alert                   

5 

                                        None                

 

                    Full Exam - General                   Neurologic            

       mental status

                    Overall:                   oriented                   

2015                              None                

 

                    Full Exam - General                   Psychiatric           

        mood and 

affect                    Overall:                   normal mood and affect     

 

                          2015                   None                

 

                    Full Exam - General                   Abdomen               

    abdominal exam  

                    Overall:                   no masses                   

2015                              None                

 

                    Full Exam - General                   Abdomen               

    abdominal exam  

                    Overall:                   no tenderness                   

2015                              None                

 

                    Full Exam - General                   Abdomen               

    abdominal exam  

                          Overall:                   normal bowel sounds        

          

                          2015                   None                

 

                    Full Exam - General                   Abdomen               

    abdominal exam  

                    Overall:                   soft                   2015

    

                                        None                

 

                    Full Exam - General                   Musculoskeletal       

            gait and

station                   Overall:                   normal gait                

                          2015                   None                

 

                    Full Exam - General                   Musculoskeletal       

            gait and

station                   Station:                   kyphosis                   

2015                              None                

 

                    Full Exam - General                   Musculoskeletal       

            spine, 

ribs and pelvis                   Spine:                    tender @ thoracic 

spine                     2015                   None                

 

                    Full Exam - General                   Musculoskeletal       

            spine, 

ribs and pelvis                   Spine:                    tender @ lumbar spin

e

                          2015                   None                

 

                    Full Exam - General                   Ears/Nose/Throat      

             

otoscopic exam                   Left tympanic membrane:                   a 

normal exam                   2015                   None                

 

                    Full Exam - General                   Ears/Nose/Throat      

             

otoscopic exam                   Right tympanic membrane:                   a 

normal exam                   2015                   None                

 

                    Full Exam - General                   Constitutional        

            general 

appearance                   Overall:                   well nourished          

                          2015                   None                

 

                    Full Exam - General                   Constitutional        

            general 

appearance                   Overall:                   well developed          

                          2015                   None                

 

                    Full Exam - General                   Constitutional        

            general 

appearance                   Evidence of Distress:                   tearful    

                          2015                   None                

 

                    Full Exam - General                   Neurologic            

       mental status

                    Overall:                   alert                   

5 

                                        None                

 

                    Full Exam - General                   Neurologic            

       mental status

                    Overall:                   oriented                   

2015                              None                

 

                    Full Exam - General                   Psychiatric           

        mood and 

affect                   Mood:                   depressed                   

2015                              tearful                

 

                    Full Exam - General                   Respiratory           

        auscultation

                          Overall:                   breath sounds clear bilater

ally    

                          2015                   None                

 

                    Full Exam - General                   Cardiovascular        

           

auscultation of heart                   Overall:                   regular rate 

                          2015                   None                

 

                    Full Exam - General                   Cardiovascular        

           

auscultation of heart                   Overall:                   normal heart 

sounds                    2015                   None                

 

                    Full Exam - General                   Cardiovascular        

           

auscultation of heart                   S3 (ventricular gallop):                

                    present                   2015                   None 

               

 

                    Full Exam - General                   Cardiovascular        

           

auscultation of heart                   Murmur:                   previously 

known murmur unchanged                   2015                   None      

         

 

                    Full Exam - General                   Cardiovascular        

           

extremities                   Overall:                   no clubbing            

                          2015                   None                

 

                    Full Exam - General                   Cardiovascular        

           

extremities                   Overall:                   No edema               

                          2015                   None                

 

                    Full Exam - General                   Cardiovascular        

           

extremities                   Overall:                   No cyanosis            

                          2015                   None                

 

                    Full Exam - General                   Constitutional        

            general 

appearance                   Overall:                   well nourished          

                          2015                   None                

 

                    Full Exam - General                   Constitutional        

            general 

appearance                   Overall:                   well developed          

                          2015                   None                

 

                    Full Exam - General                   Constitutional        

            general 

appearance                   Overall:                   in no acute distress    

                          2015                   None                

 

                    Full Exam - General                   Ears/Nose/Throat      

             oral 

cavity/pharynx/larynx                    Overall:                   oral mucosa 

clear                     2015                   None                

 

                    Full Exam - General                   Ears/Nose/Throat      

             

otoscopic exam                          Left external auditory canal:           

       

                    complete cerumen impaction                   2015     

              None  

             

 

                    Full Exam - General                   Ears/Nose/Throat      

             

otoscopic exam                          Right external auditory canal:          

       

                    complete cerumen impaction                   2015     

              None 

              

 

                    Full Exam - General                   Ears/Nose/Throat      

             

otoscopic exam                   Right tympanic membrane:                   

abnormal light reflex                   2015                   None       

        

 

                    Full Exam - General                   Ears/Nose/Throat      

             

otoscopic exam                   Right tympanic membrane:                   air-

fluid level                   2015                   secondary to cerumen 

removal                 

 

                    Full Exam - General                   Respiratory           

        auscultation

                          Overall:                   breath sounds clear bilater

ally    

                          2015                   None                

 

                    Full Exam - General                   Neurologic            

       mental status

                    Overall:                   alert                   

5 

                                        None                

 

                    Full Exam - General                   Neurologic            

       mental status

                    Overall:                   oriented                   

2015                              None                

 

                    Full Exam - General                   Psychiatric           

        mood and 

affect                    Overall:                   normal mood and affect     

 

                          2015                   None                

 

                    Full Exam - General                   Ears/Nose/Throat      

             

otoscopic exam                   Left tympanic membrane:                   

abnormal light reflex                   2015                   None       

        

 

                    Full Exam - General                   Cardiovascular        

           

auscultation of heart                   S3 (ventricular gallop):                

                    present                   2015                   None 

               

 

                    Full Exam - General                   Cardiovascular        

           

auscultation of heart                   Overall:                   regular rate 

                          2015                   None                

 

                    Full Exam - General                   Constitutional        

            general 

appearance                   Overall:                   well nourished          

                          2015                   None                

 

                    Full Exam - General                   Constitutional        

            general 

appearance                   Overall:                   well developed          

                          2015                   None                

 

                    Full Exam - General                   Constitutional        

            general 

appearance                   Overall:                   in no acute distress    

                          2015                   None                

 

                    Full Exam - General                   Neurologic            

       mental status

                    Overall:                   oriented                   

2015                              None                

 

                    Full Exam - General                   Neurologic            

       mental status

                    Overall:                   alert                   

5 

                                        None                

 

                    Full Exam - General                   Psychiatric           

        mood and 

affect                    Overall:                   normal mood and affect     

 

                          2015                   None                

 

                    Full Exam - General                   Musculoskeletal       

            right 

lower extremity                         Inspection - right lower leg:           

      

                    swelling                   2015                   mini

mal                

 

                    Full Exam - General                   Musculoskeletal       

            right 

lower extremity                         Palpation - right lower leg:            

      

                    tender                   2015                   medial

ly                

 

                    Full Exam - General                   Constitutional        

            general 

appearance                   Overall:                   well nourished          

                          2015                   None                

 

                    Full Exam - General                   Constitutional        

            general 

appearance                   Overall:                   well developed          

                          2015                   None                

 

                    Full Exam - General                   Constitutional        

            general 

appearance                   Overall:                   in no acute distress    

                          2015                   None                

 

                    Full Exam - General                   Neurologic            

       mental status

                    Overall:                   alert                   

5 

                                        None                

 

                    Full Exam - General                   Neurologic            

       mental status

                    Overall:                   oriented                   

2015                              None                

 

                    Full Exam - General                   Psychiatric           

        mood and 

affect                    Overall:                   normal mood and affect     

 

                          2015                   None                

 

                    Full Exam - General                   Respiratory           

        auscultation

                          Overall:                   breath sounds clear bilater

ally    

                          2015                   None                

 

                    Full Exam - General                   Cardiovascular        

           

auscultation of heart                   Overall:                   regular rate 

                          2015                   None                

 

                    Full Exam - General                   Cardiovascular        

           

auscultation of heart                   Overall:                   normal heart 

sounds                    2015                   None                

 

                    Full Exam - General                   Cardiovascular        

           

auscultation of heart                   S3 (ventricular gallop):                

                    present                   2015                   None 

               

 

                    Full Exam - General                   Cardiovascular        

           

auscultation of heart                   Murmur:                   previously 

known murmur unchanged                   2015                   None      

         

 

                    Full Exam - General                   Musculoskeletal       

            spine, 

ribs and pelvis                   Spine:                    tender @ thoracic 

spine                     2015                   None                

 

                    Full Exam - General                   Constitutional        

            general 

appearance                   Overall:                   well nourished          

                          2015                   None                

 

                    Full Exam - General                   Constitutional        

            general 

appearance                   Overall:                   well developed          

                          2015                   None                

 

                    Full Exam - General                   Constitutional        

            general 

appearance                   Overall:                   in no acute distress    

                          2015                   None                

 

                    Full Exam - General                   Neurologic            

       mental status

                    Overall:                   alert                   

5 

                                        None                

 

                    Full Exam - General                   Neurologic            

       mental status

                    Overall:                   oriented                   

2015                              None                

 

                    Full Exam - General                   Psychiatric           

        mood and 

affect                    Overall:                   normal mood and affect     

 

                          2015                   None                

 

                    Full Exam - General                   Respiratory           

        auscultation

                          Overall:                   breath sounds clear bilater

ally    

                          2015                   None                

 

                    Full Exam - General                   Cardiovascular        

           

auscultation of heart                   Overall:                   regular rate 

                          2015                   None                

 

                    Full Exam - General                   Cardiovascular        

           

auscultation of heart                   Overall:                   normal heart 

sounds                    2015                   None                

 

                    Full Exam - General                   Cardiovascular        

           

auscultation of heart                   S3 (ventricular gallop):                

                    present                   2015                   None 

               

 

                    Full Exam - General                   Cardiovascular        

           

auscultation of heart                   Murmur:                   previously 

known murmur unchanged                   2015                   None      

         

 

                    Full Exam - General                   Cardiovascular        

           

extremities                   Overall:                   no clubbing            

                          2015                   None                

 

                    Full Exam - General                   Cardiovascular        

           

extremities                   Overall:                   No edema               

                          2015                   None                

 

                    Full Exam - General                   Cardiovascular        

           

extremities                   Overall:                   No cyanosis            

                          2015                   None                

 

                    Full Exam - General                   Abdomen               

    abdominal exam  

                    Overall:                   no masses                   

2015                              None                

 

                    Full Exam - General                   Abdomen               

    abdominal exam  

                    Overall:                   no tenderness                   

2015                              None                

 

                    Full Exam - General                   Abdomen               

    abdominal exam  

                          Overall:                   normal bowel sounds        

          

                          2015                   None                

 

                    Full Exam - General                   Abdomen               

    abdominal exam  

                    Overall:                   soft                   2015

    

                                        None                

 

                    Full Exam - General                   Integument            

       inspection of

skin                   Location:                   right arm                   

2015                              upper posterior with irregular nevus    

           



 

                    Full Exam - General                   Ears/Nose/Throat      

             

internal nose                   Turbinates:                   erythema          

                          2014                   None                

 

                    Full Exam - General                   Ears/Nose/Throat      

             oral 

cavity/pharynx/larynx                    Oropharynx:                   postnasal

drainage                   2014                   None                

 

                    Full Exam - General                   Ears/Nose/Throat      

             oral 

cavity/pharynx/larynx                    Oropharynx:                   erythema 

                          2014                   None                

 

                    Full Exam - General                   Constitutional        

            general 

appearance                   Overall:                   well nourished          

                          2014                   None                

 

                    Full Exam - General                   Constitutional        

            general 

appearance                   Overall:                   in no acute distress    

                          2014                   None                

 

                    Full Exam - General                   Respiratory           

        auscultation

                          Overall:                   breath sounds clear bilater

ally    

                          2014                   None                

 

                    Full Exam - General                   Cardiovascular        

           

auscultation of heart                   Overall:                   regular rate 

                          2014                   None                

 

                    Full Exam - General                   Cardiovascular        

           

auscultation of heart                   Overall:                   normal heart 

sounds                    2014                   None                

 

                    Full Exam - General                   Cardiovascular        

           

auscultation of heart                   Overall:                   no murmurs   

                          2014                   None                

 

                    Full Exam - General                   Psychiatric           

        

orientation/consciousness                   Overall:                   oriented 

to person, place and time                   2014                   None   

            

 

                    Full Exam - General                   Ears/Nose/Throat      

             

otoscopic exam                   Right tympanic membrane:                   

effusion                   2014                   None                

 

                    Full Exam - General                   Ears/Nose/Throat      

             

otoscopic exam                   Left tympanic membrane:                   a 

normal exam                   2014                   None                

 

                    Full Exam - General                   Ears/Nose/Throat      

             

otoscopic exam                   Right tympanic membrane:                   loss

of landmarks                   2014                   None                

 

                    Full Exam - General                   Constitutional        

            general 

appearance                   Overall:                   well nourished          

                          2014                   None                

 

                    Full Exam - General                   Constitutional        

            general 

appearance                   Overall:                   well developed          

                          2014                   None                

 

                    Full Exam - General                   Constitutional        

            general 

appearance                   Overall:                   in no acute distress    

                          2014                   None                

 

                    Full Exam - General                   Neurologic            

       mental status

                    Overall:                   alert                   

4 

                                        None                

 

                    Full Exam - General                   Neurologic            

       mental status

                    Overall:                   oriented                   

2014                              None                

 

                    Full Exam - General                   Psychiatric           

        mood and 

affect                    Overall:                   normal mood and affect     

 

                          2014                   None                

 

                    Full Exam - General                   Respiratory           

        auscultation

                          Overall:                   breath sounds clear bilater

ally    

                          2014                   None                

 

                    Full Exam - General                   Cardiovascular        

           

auscultation of heart                   Overall:                   regular rate 

                          2014                   None                

 

                    Full Exam - General                   Cardiovascular        

           

auscultation of heart                   Overall:                   normal heart 

sounds                    2014                   None                

 

                    Full Exam - General                   Cardiovascular        

           

auscultation of heart                   S3 (ventricular gallop):                

                    present                   2014                   None 

               

 

                    Full Exam - General                   Cardiovascular        

           

auscultation of heart                   Murmur:                   previously 

known murmur unchanged                   2014                   None      

         

 

                    Full Exam - General                   Cardiovascular        

           

extremities                   Overall:                   no clubbing            

                          2014                   None                

 

                    Full Exam - General                   Cardiovascular        

           

extremities                   Overall:                   No cyanosis            

                          2014                   None                

 

                    Full Exam - General                   Cardiovascular        

           

extremities                   Edema present:                   non-pitting      

                          2014                   None                

 

                    Full Exam - General                   Constitutional        

            general 

appearance                   Overall:                   well nourished          

                          2014                   None                

 

                    Full Exam - General                   Constitutional        

            general 

appearance                   Overall:                   well developed          

                          2014                   None                

 

                    Full Exam - General                   Constitutional        

            general 

appearance                   Overall:                   in no acute distress    

                          2014                   None                

 

                    Full Exam - General                   Neurologic            

       mental status

                    Overall:                   alert                   

4 

                                        None                

 

                    Full Exam - General                   Neurologic            

       mental status

                    Overall:                   oriented                   

2014                              None                

 

                    Full Exam - General                   Psychiatric           

        mood and 

affect                    Overall:                   normal mood and affect     

 

                          2014                   None                

 

                    Full Exam - General                   Ears/Nose/Throat      

             

otoscopic exam                   Overall:                   external auditory 

canals clear                   2014                   None                

 

                    Full Exam - General                   Ears/Nose/Throat      

             

otoscopic exam                   Overall:                   tympanic membranes 

clear                     2014                   None                

 

                    Full Exam - General                   Ears/Nose/Throat      

             

internal nose                   Turbinates:                   hypertrophy       

                          2014                   None                

 

                    Full Exam - General                   Ears/Nose/Throat      

             oral 

cavity/pharynx/larynx                    Overall:                   oral mucosa 

clear                     2014                   None                

 

                    Full Exam - General                   Respiratory           

        auscultation

                          Overall:                   breath sounds clear bilater

ally    

                          2014                   None                

 

                    Full Exam - General                   Cardiovascular        

           

auscultation of heart                   Overall:                   regular rate 

                          2014                   None                

 

                    Full Exam - General                   Cardiovascular        

           

auscultation of heart                   Overall:                   normal heart 

sounds                    2014                   None                

 

                    Full Exam - General                   Cardiovascular        

           

auscultation of heart                   S3 (ventricular gallop):                

                    present                   2014                   None 

               

 

                    Full Exam - General                   Cardiovascular        

           

auscultation of heart                   Murmur:                   previously 

known murmur unchanged                   2014                   None      

         

 

                    Full Exam - General                   Constitutional        

            general 

appearance                   Nourishment:                   obese               

                          2014                   None                

 

                    Full Exam - General                   Constitutional        

            general 

appearance                   Overall:                   well developed          

                          2014                   None                

 

                    Full Exam - General                   Constitutional        

            general 

appearance                   Overall:                   in no acute distress    

                          2014                   None                

 

                    Full Exam - General                   Ears/Nose/Throat      

             

external ear                   Overall:                   normal appearance     

                          2014                   None                

 

                    Full Exam - General                   Ears/Nose/Throat      

             

otoscopic exam                   Overall:                   tympanic membranes 

clear                     2014                   None                

 

                    Full Exam - General                   Ears/Nose/Throat      

             

otoscopic exam                   Overall:                   external auditory 

canals clear                   2014                   None                

 

                    Full Exam - General                   Ears/Nose/Throat      

             

otoscopic exam                          Right external auditory canal:          

       

                    partial cerumen occlusion                   2014      

             None  

             

 

                    Full Exam - General                   Ears/Nose/Throat      

             

otoscopic exam                   Right tympanic membrane:                   not 

visualized                   2014                   None                

 

                    Full Exam - General                   Ears/Nose/Throat      

             

otoscopic exam                   Left tympanic membrane:                   a 

normal exam                   2014                   None                

 

                    Full Exam - General                   Respiratory           

        auscultation

                          Overall:                   breath sounds clear bilater

ally    

                          2014                   None                

 

                    Full Exam - General                   Cardiovascular        

           

auscultation of heart                   Overall:                   normal heart 

sounds                    2014                   None                

 

                    Full Exam - General                   Cardiovascular        

           

auscultation of heart                   Overall:                   regular rate 

                          2014                   None                

 

                    Full Exam - General                   Psychiatric           

        

orientation/consciousness                   Overall:                   oriented 

to person, place and time                   2014                   None   

            

 

                    Full Exam - General                   Respiratory           

        auscultation

                          Overall:                   breath sounds clear bilater

ally    

                          10/15/2013                   None                

 

                    Full Exam - General                   Constitutional        

            general 

appearance                   Overall:                   well developed          

                          10/15/2013                   None                

 

                    Full Exam - General                   Constitutional        

            general 

appearance                   Overall:                   in no acute distress    

                          10/15/2013                   None                

 

                    Full Exam - General                   Constitutional        

            general 

appearance                   Nourishment:                   obese               

                          10/15/2013                   None                

 

                    Full Exam - General                   Ears/Nose/Throat      

             

otoscopic exam                   Left tympanic membrane:                   not 

visualized                   10/15/2013                   due to cerumen        

       

 

                    Full Exam - General                   Ears/Nose/Throat      

             

otoscopic exam                   Right tympanic membrane:                   

abnormal light reflex                   10/15/2013                   None       

        

 

                    Full Exam - General                   Cardiovascular        

           

auscultation of heart                   Overall:                   regular rate 

                          10/15/2013                   None                

 

                    Full Exam - General                   Cardiovascular        

           

auscultation of heart                   Overall:                   normal heart 

sounds                    10/15/2013                   None                

 

                    Full Exam - General                   Psychiatric           

        

orientation/consciousness                   Overall:                   oriented 

to person, place and time                   10/15/2013                   None   

            

 

                    Full Exam - General                   Constitutional        

            general 

appearance                   Overall:                   well nourished          

                          2013                   None                

 

                    Full Exam - General                   Constitutional        

            general 

appearance                   Overall:                   well developed          

                          2013                   None                

 

                    Full Exam - General                   Constitutional        

            general 

appearance                   Overall:                   in no acute distress    

                          2013                   None                

 

                    Full Exam - General                   Neurologic            

       mental status

                    Overall:                   alert                   

3 

                                        None                

 

                    Full Exam - General                   Neurologic            

       mental status

                    Overall:                   oriented                   

2013                              None                

 

                    Full Exam - General                   Psychiatric           

        mood and 

affect                    Overall:                   normal mood and affect     

 

                          2013                   None                

 

                    Full Exam - General                   Respiratory           

        auscultation

                          Overall:                   breath sounds clear bilater

ally    

                          2013                   None                

 

                    Full Exam - General                   Cardiovascular        

           

auscultation of heart                   Overall:                   regular rate 

                          2013                   None                

 

                    Full Exam - General                   Cardiovascular        

           

auscultation of heart                   Overall:                   normal heart 

sounds                    2013                   None                

 

                    Full Exam - General                   Cardiovascular        

           

auscultation of heart                   S3 (ventricular gallop):                

                    present                   2013                   None 

               

 

                    Full Exam - General                   Cardiovascular        

           

extremities                   Overall:                   no clubbing            

                          2013                   None                

 

                    Full Exam - General                   Cardiovascular        

           

extremities                   Overall:                   No cyanosis            

                          2013                   None                

 

                    Full Exam - General                   Cardiovascular        

           

auscultation of heart                   Murmur:                   previously 

known murmur unchanged                   2013                   None      

         

 

                    Full Exam - General                   Constitutional        

            general 

appearance                   Overall:                   well nourished          

                          2013                   None                

 

                    Full Exam - General                   Constitutional        

            general 

appearance                   Overall:                   well developed          

                          2013                   None                

 

                    Full Exam - General                   Constitutional        

            general 

appearance                   Overall:                   in no acute distress    

                          2013                   None                

 

                    Full Exam - General                   Neurologic            

       mental status

                    Overall:                   alert                   

3 

                                        None                

 

                    Full Exam - General                   Neurologic            

       mental status

                    Overall:                   oriented                   

2013                              None                

 

                    Full Exam - General                   Psychiatric           

        mood and 

affect                    Overall:                   normal mood and affect     

 

                          2013                   None                

 

                    Full Exam - General                   Ears/Nose/Throat      

             

otoscopic exam                   Overall:                   external auditory 

canals clear                   2013                   None                

 

                    Full Exam - General                   Ears/Nose/Throat      

             

otoscopic exam                   Left tympanic membrane:                   air-

fluid level                   2013                   None                

 

                    Full Exam - General                   Ears/Nose/Throat      

             

otoscopic exam                   Right tympanic membrane:                   air-

fluid level                   2013                   None                

 

                    Full Exam - General                   Ears/Nose/Throat      

             

internal nose                   Turbinates:                   hypertrophy       

                          2013                   None                

 

                    Full Exam - General                   Ears/Nose/Throat      

             

internal nose                   Turbinates:                   erythema          

                          2013                   None                

 

                    Full Exam - General                   Ears/Nose/Throat      

             oral 

cavity/pharynx/larynx                    Oropharynx:                   postnasal

drainage                   2013                   None                

 

                    Full Exam - General                   Respiratory           

        auscultation

                          Overall:                   breath sounds clear bilater

ally    

                          2013                   None                

 

                    Full Exam - General                   Cardiovascular        

           

auscultation of heart                   Overall:                   regular rate 

                          2013                   None                

 

                    Full Exam - General                   Cardiovascular        

           

auscultation of heart                   Overall:                   normal heart 

sounds                    2013                   None                

 

                    Full Exam - General                   Cardiovascular        

           

auscultation of heart                   S3 (ventricular gallop):                

                    present                   2013                   None 

               

 

                    Full Exam - General                   Cardiovascular        

           

auscultation of heart                   Murmur:                   previously 

known murmur unchanged                   2013                   None      

         

 

                    Full Exam - General                   Constitutional        

            general 

appearance                   Overall:                   well nourished          

                          2013                   None                

 

                    Full Exam - General                   Constitutional        

            general 

appearance                   Overall:                   well developed          

                          2013                   None                

 

                    Full Exam - General                   Constitutional        

            general 

appearance                   Overall:                   in no acute distress    

                          2013                   None                

 

                    Full Exam - General                   Neurologic            

       mental status

                    Overall:                   alert                   

3 

                                        None                

 

                    Full Exam - General                   Neurologic            

       mental status

                    Overall:                   oriented                   

2013                              None                

 

                    Full Exam - General                   Psychiatric           

        mood and 

affect                    Overall:                   normal mood and affect     

 

                          2013                   None                

 

                    Full Exam - General                   Ears/Nose/Throat      

             

otoscopic exam                   Overall:                   external auditory 

canals clear                   2013                   None                

 

                    Full Exam - General                   Ears/Nose/Throat      

             

otoscopic exam                   Overall:                   tympanic membranes 

clear                     2013                   None                

 

                    Full Exam - General                   Ears/Nose/Throat      

             

internal nose                   Overall:                   bilateral nasal 

cavities clear                   2013                   None              

 

 

                    Full Exam - General                   Ears/Nose/Throat      

             oral 

cavity/pharynx/larynx                    Overall:                   oral mucosa 

clear                     2013                   None                

 

                    Full Exam - General                   Constitutional        

            general 

appearance                   Overall:                   well nourished          

                          05/10/2013                   None                

 

                    Full Exam - General                   Constitutional        

            general 

appearance                   Overall:                   well developed          

                          05/10/2013                   None                

 

                    Full Exam - General                   Constitutional        

            general 

appearance                   Overall:                   in no acute distress    

                          05/10/2013                   None                

 

                    Full Exam - General                   Ears/Nose/Throat      

             

otoscopic exam                   Overall:                   external auditory 

canals clear                   05/10/2013                   no tragal or pinna 

pain with movement                

 

                    Full Exam - General                   Ears/Nose/Throat      

             

otoscopic exam                   Overall:                   tympanic membranes 

clear                     05/10/2013                   right TM has perforation 

(appears old?) at 6:00 position                

 

                    Full Exam - General                   Ears/Nose/Throat      

             

otoscopic exam                   Perforation:                   right           

                          05/10/2013                   None                

 

                    Full Exam - General                   Ears/Nose/Throat      

             

internal nose                   Overall:                   bilateral nasal 

cavities clear                   05/10/2013                   None              

 

 

                    Full Exam - General                   Ears/Nose/Throat      

             

internal nose                   Overall:                   turbinates benign    

                          05/10/2013                   None                

 

                    Full Exam - General                   Ears/Nose/Throat      

             

internal nose                   Overall:                   no drainage          

                          05/10/2013                   None                

 

                    Full Exam - General                   Ears/Nose/Throat      

             

internal nose                   Sinus tenderness:                   left 

maxillary                   05/10/2013                   marked                

 

                    Full Exam - General                   Ears/Nose/Throat      

             

internal nose                   Sinus tenderness:                   right 

maxillary                   05/10/2013                   marked                

 

                    Full Exam - General                   Ears/Nose/Throat      

             

internal nose                   Sinus tenderness:                   right 

frontal                   05/10/2013                   None                

 

                    Full Exam - General                   Ears/Nose/Throat      

             

internal nose                   Sinus tenderness:                   left frontal

                          05/10/2013                   None                

 

                    Full Exam - General                   Ears/Nose/Throat      

             oral 

cavity/pharynx/larynx                    Overall:                   

oropharyngeal mucosa clear                   05/10/2013                   None  

             

 

                    Full Exam - General                   Respiratory           

        auscultation

                          Overall:                   breath sounds clear bilater

ally    

                          05/10/2013                   None                

 

                    Full Exam - General                   Cardiovascular        

           

auscultation of heart                   Overall:                   regular rate 

                          05/10/2013                   None                

 

                    Full Exam - General                   Cardiovascular        

           

auscultation of heart                   Overall:                   normal heart 

sounds                    05/10/2013                   None                

 

                    Full Exam - General                   Cardiovascular        

           

auscultation of heart                   Overall:                   no murmurs   

                          05/10/2013                   None                

 

                    Full Exam - General                   Constitutional        

            general 

appearance                   Overall:                   well nourished          

                          2013                   None                

 

                    Full Exam - General                   Constitutional        

            general 

appearance                   Overall:                   well developed          

                          2013                   None                

 

                    Full Exam - General                   Constitutional        

            general 

appearance                   Overall:                   in no acute distress    

                          2013                   None                

 

                    Full Exam - General                   Neurologic            

       mental status

                    Overall:                   alert                   

3 

                                        None                

 

                    Full Exam - General                   Neurologic            

       mental status

                    Overall:                   oriented                   

2013                              None                

 

                    Full Exam - General                   Psychiatric           

        mood and 

affect                    Overall:                   normal mood and affect     

 

                          2013                   None                

 

                    Full Exam - General                   Chest/Breast          

         breast and 

axillae palpation                       Left lower inner quadrant:              

    

                    tender                   2013                   None  

              

 

                    Full Exam - General                   Chest/Breast          

         breast and 

axillae palpation                       Left lower inner quadrant:              

    

                    no mass                   2013                   None 

               

 

                    Full Exam - General                   Chest/Breast          

         breast and 

axillae palpation                       Left upper outer quadrant:              

    

                    no mass                   2013                   None 

               

 

                    Full Exam - General                   Chest/Breast          

         breast and 

axillae palpation                       Left upper inner quadrant:              

    

                    no mass                   2013                   None 

               

 

                    Full Exam - General                   Chest/Breast          

         breast and 

axillae palpation                       Left lower outer quadrant:              

    

                    no mass                   2013                   None 

               

 

                    Full Exam - General                   Chest/Breast          

         breast and 

axillae palpation                       Right lower inner quadrant:             

    

                    tender                   2013                   None  

              

 

                    Full Exam - General                   Chest/Breast          

         breast and 

axillae palpation                       Right upper inner quadrant:             

    

                    no mass                   2013                   None 

               

 

                    Full Exam - General                   Chest/Breast          

         breast and 

axillae palpation                       Right upper outer quadrant:             

    

                    no mass                   2013                   None 

               

 

                    Full Exam - General                   Chest/Breast          

         breast and 

axillae palpation                       Right lower outer quadrant:             

    

                    no mass                   2013                   None 

               

 

                    Full Exam - General                   Constitutional        

            general 

appearance                   Overall:                   well nourished          

                          2012                   None                

 

                    Full Exam - General                   Constitutional        

            general 

appearance                   Overall:                   well developed          

                          2012                   None                

 

                    Full Exam - General                   Constitutional        

            general 

appearance                   Overall:                   in no acute distress    

                          2012                   None                

 

                    Full Exam - General                   Neurologic            

       mental status

                    Overall:                   alert                   

2 

                                        None                

 

                    Full Exam - General                   Neurologic            

       mental status

                    Overall:                   oriented                   

2012                              None                

 

                    Full Exam - General                   Psychiatric           

        mood and 

affect                    Overall:                   normal mood and affect     

 

                          2012                   None                

 

                    Full Exam - General                   Respiratory           

        auscultation

                          Overall:                   breath sounds clear bilater

ally    

                          2012                   None                

 

                    Full Exam - General                   Cardiovascular        

           

auscultation of heart                   Overall:                   regular rate 

                          2012                   None                

 

                    Full Exam - General                   Cardiovascular        

           

auscultation of heart                   Overall:                   normal heart 

sounds                    2012                   None                

 

                    Full Exam - General                   Cardiovascular        

           

auscultation of heart                   S3 (ventricular gallop):                

                    present                   2012                   None 

               

 

                    Full Exam - General                   Cardiovascular        

           

auscultation of heart                   Murmur:                   previously 

known murmur unchanged                   2012                   None      

         

 

                    Full Exam - General                   Ears/Nose/Throat      

             

otoscopic exam                          Left external auditory canal:           

       

                    complete cerumen impaction                   2012     

              None  

             

 

                    Full Exam - General                   Ears/Nose/Throat      

             

otoscopic exam                          Right external auditory canal:          

       

                    partial cerumen occlusion                   2012      

             None  

             

 

                    Full Exam - General                   Ears/Nose/Throat      

             

otoscopic exam                   Left tympanic membrane:                   

abnormal light reflex                   2012                   None       

        

 

                    Full Exam - General                   Ears/Nose/Throat      

             

otoscopic exam                   Right tympanic membrane:                   

abnormal light reflex                   2012                   None       

        

 

                    Full Exam - General                   Ears/Nose/Throat      

             

internal nose                   Turbinates:                   hypertrophy       

                          2012                   None                

 

                    Full Exam - General                   Ears/Nose/Throat      

             oral 

cavity/pharynx/larynx                    Overall:                   oral mucosa 

clear                     2012                   None                

 

                    Full Exam - General                   Abdomen               

    abdominal exam  

                    Overall:                   no masses                   

2012                              None                

 

                    Full Exam - General                   Abdomen               

    abdominal exam  

                          Overall:                   normal bowel sounds        

          

                          2012                   None                

 

                    Full Exam - General                   Abdomen               

    abdominal exam  

                    Overall:                   soft                   2012

    

                                        None                

 

                    Full Exam - General                   Abdomen               

    abdominal exam  

                          Right upper quadrant:                   tender to palp

ation     

                          2012                   None                

 

                    Full Exam - General                   Abdomen               

    abdominal exam  

                          Epigastric:                   tender to palpation     

          

                          2012                   None                

 

                    Full Exam - General                   Constitutional        

            general 

appearance                   Overall:                   well nourished          

                          10/23/2012                   None                

 

                    Full Exam - General                   Constitutional        

            general 

appearance                   Overall:                   well developed          

                          10/23/2012                   None                

 

                    Full Exam - General                   Constitutional        

            general 

appearance                   Overall:                   in no acute distress    

                          10/23/2012                   None                

 

                    Full Exam - General                   Neurologic            

       mental status

                    Overall:                   alert                   10/23/201

2 

                                        None                

 

                    Full Exam - General                   Neurologic            

       mental status

                    Overall:                   oriented                   

10/23/2012                              None                

 

                    Full Exam - General                   Psychiatric           

        mood and 

affect                    Overall:                   normal mood and affect     

 

                          10/23/2012                   None                

 

                    Full Exam - General                   Abdomen               

    abdominal exam  

                    Overall:                   no masses                   

10/23/2012                              None                

 

                    Full Exam - General                   Abdomen               

    abdominal exam  

                          Overall:                   normal bowel sounds        

          

                          10/23/2012                   None                

 

                    Full Exam - General                   Abdomen               

    abdominal exam  

                    Overall:                   soft                   10/23/2012

    

                                        None                

 

                    Full Exam - General                   Abdomen               

    abdominal exam  

                          Epigastric:                   tender to palpation     

          

                          10/23/2012                   None                

 

                    Full Exam - General                   Abdomen               

    abdominal exam  

                          Left upper quadrant:                   tender to palpa

tion      

                          10/23/2012                   None                

 

                    Full Exam - General                   Abdomen               

    abdominal exam  

                          Left lower quadrant:                   tender to palpa

tion      

                          10/23/2012                   None                

 

                    Full Exam - General                   Abdomen               

    abdominal exam  

                          Right upper quadrant:                   non-tender to 

palpation 

                          10/23/2012                   None                

 

                    Full Exam - General                   Abdomen               

    abdominal exam  

                          Right lower quadrant:                   tender to palp

ation     

                          10/23/2012                   None                

 

                    Full Exam - General                   Constitutional        

            general 

appearance                   Overall:                   well nourished          

                          2012                   None                

 

                    Full Exam - General                   Constitutional        

            general 

appearance                   Overall:                   well developed          

                          2012                   None                

 

                    Full Exam - General                   Constitutional        

            general 

appearance                   Evidence of Distress:                   anxious    

                          2012                   None                

 

                    Full Exam - General                   Respiratory           

        auscultation

                          Overall:                   breath sounds clear bilater

ally    

                          2012                   None                

 

                    Full Exam - General                   Cardiovascular        

           

auscultation of heart                   Overall:                   regular rate 

                          2012                   None                

 

                    Full Exam - General                   Cardiovascular        

           

auscultation of heart                   Overall:                   normal heart 

sounds                    2012                   None                

 

                    Full Exam - General                   Cardiovascular        

           

auscultation of heart                   Murmur:                   previously 

known murmur unchanged                   2012                   None      

         

 

                    Full Exam - General                   Cardiovascular        

           

auscultation of heart                   S3 (ventricular gallop):                

                    present                   2012                   None 

               

 

                    Full Exam - General                   Neurologic            

       mental status

                    Overall:                   alert                   

2 

                                        None                

 

                    Full Exam - General                   Neurologic            

       mental status

                    Overall:                   oriented                   

2012                              None                

 

                    Full Exam - General                   Psychiatric           

        mood and 

affect                    Overall:                   normal mood and affect     

 

                          2012                   None                

 

                    Full Exam - General                   Constitutional        

            general 

appearance                   Overall:                   well nourished          

                          2012                   None                

 

                    Full Exam - General                   Constitutional        

            general 

appearance                   Overall:                   well developed          

                          2012                   None                

 

                    Full Exam - General                   Constitutional        

            general 

appearance                   Overall:                   in no acute distress    

                          2012                   None                

 

                    Full Exam - General                   Neurologic            

       mental status

                    Overall:                   alert                   

2 

                                        None                

 

                    Full Exam - General                   Neurologic            

       mental status

                    Overall:                   oriented                   

2012                              None                

 

                    Full Exam - General                   Psychiatric           

        mood and 

affect                    Overall:                   normal mood and affect     

 

                          2012                   None                

 

                    Full Exam - General                   Respiratory           

        auscultation

                          Overall:                   breath sounds clear bilater

ally    

                          2012                   None                

 

                    Full Exam - General                   Cardiovascular        

           

auscultation of heart                   Overall:                   regular rate 

                          2012                   None                

 

                    Full Exam - General                   Cardiovascular        

           

auscultation of heart                   Overall:                   normal heart 

sounds                    2012                   None                

 

                    Full Exam - General                   Cardiovascular        

           

auscultation of heart                   S3 (ventricular gallop):                

                    present                   2012                   None 

               

 

                    Full Exam - General                   Cardiovascular        

           

auscultation of heart                   Murmur:                   previously 

known murmur unchanged                   2012                   None      

         

 

                    Full Exam - General                   Cardiovascular        

           

extremities                   Overall:                   no clubbing            

                          2012                   None                

 

                    Full Exam - General                   Cardiovascular        

           

extremities                   Overall:                   No edema               

                          2012                   None                

 

                    Full Exam - General                   Cardiovascular        

           

extremities                   Overall:                   No cyanosis            

                          2012                   None                

 

                    Full Exam - General                   Constitutional        

            general 

appearance                   Overall:                   well nourished          

                          2012                   None                

 

                    Full Exam - General                   Constitutional        

            general 

appearance                   Overall:                   well developed          

                          2012                   None                

 

                    Full Exam - General                   Constitutional        

            general 

appearance                   Overall:                   in no acute distress    

                          2012                   None                

 

                    Full Exam - General                   Neurologic            

       mental status

                    Overall:                   alert                   

2 

                                        None                

 

                    Full Exam - General                   Neurologic            

       mental status

                    Overall:                   oriented                   

2012                              None                

 

                    Full Exam - General                   Psychiatric           

        mood and 

affect                    Overall:                   normal mood and affect     

 

                          2012                   None                

 

                    Full Exam - General                   Musculoskeletal       

            spine, 

ribs and pelvis                   Spine:                    tender @ lumbar spin

e

                          2012                   None                

 

                    Full Exam - General                   Constitutional        

            general 

appearance                   Overall:                   well nourished          

                          2012                   None                

 

                    Full Exam - General                   Constitutional        

            general 

appearance                   Overall:                   well developed          

                          2012                   None                

 

                    Full Exam - General                   Constitutional        

            general 

appearance                   Overall:                   in no acute distress    

                          2012                   None                

 

                    Full Exam - General                   Neurologic            

       mental status

                    Overall:                   alert                   05/29/201

2 

                                        None                

 

                    Full Exam - General                   Psychiatric           

        mood and 

affect                    Overall:                   normal mood and affect     

 

                          2012                   None                

 

                    Full Exam - General                   Musculoskeletal       

            spine, 

ribs and pelvis                   Sacroiliac joints:                   tender 

right sacroiliac joint                   2012                   None      

         

 

                    Full Exam - General                   Musculoskeletal       

            spine, 

ribs and pelvis                   Spine:                    tender @ thoracic 

spine                     2012                   None                

 

                    Full Exam - General                   Musculoskeletal       

            spine, 

ribs and pelvis                   Spine:                    tender @ lumbar spin

e

                          2012                   None                

 

                    Full Exam - General                   Constitutional        

            general 

appearance                   Overall:                   well nourished          

                          2012                   None                

 

                    Full Exam - General                   Constitutional        

            general 

appearance                   Overall:                   well developed          

                          2012                   None                

 

                    Full Exam - General                   Constitutional        

            general 

appearance                   Overall:                   in no acute distress    

                          2012                   None                

 

                    Full Exam - General                   Musculoskeletal       

            spine, 

ribs and pelvis                   Spine:                    tender @ thoracic 

spine                     2012                   None                

 

                    Full Exam - General                   Neurologic            

       mental status

                    Overall:                   alert                   

2 

                                        None                

 

                    Full Exam - General                   Neurologic            

       mental status

                    Overall:                   oriented                   

2012                              None                

 

                    Full Exam - General                   Psychiatric           

        mood and 

affect                    Overall:                   normal mood and affect     

 

                          2012                   None                

 

                    Full Exam - General                   Constitutional        

            general 

appearance                   Overall:                   well nourished          

                          2012                   None                

 

                    Full Exam - General                   Constitutional        

            general 

appearance                   Overall:                   well developed          

                          2012                   None                

 

                    Full Exam - General                   Constitutional        

            general 

appearance                   Overall:                   in no acute distress    

                          2012                   None                

 

                    Full Exam - General                   Neurologic            

       mental status

                    Overall:                   alert                   

2 

                                        None                

 

                    Full Exam - General                   Neurologic            

       mental status

                    Overall:                   oriented                   

2012                              None                

 

                    Full Exam - General                   Psychiatric           

        mood and 

affect                    Overall:                   normal mood and affect     

 

                          2012                   None                

 

                    Full Exam - General                   Musculoskeletal       

            spine, 

ribs and pelvis                   Spine:                    tender @ thoracic 

spine                     2012                   None                

 

                    Full Exam - General                   Musculoskeletal       

            spine, 

ribs and pelvis                   Spine:                    tender @ lumbar spin

e

                          2012                   None                

 

                    Full Exam - General                   Musculoskeletal       

            gait and

station                   Overall:                   normal station             

                          2012                   None                

 

                    Full Exam - General                   Musculoskeletal       

            gait and

station                   Gait:                   antalgic                   

2012                              None                

 

                    Full Exam - General                   Musculoskeletal       

            spine, 

ribs and pelvis                   Sacroiliac joints:                   tender 

right sacroiliac joint                   2012                   None      

         

 

                    Full Exam - General                   Musculoskeletal       

            spine, 

ribs and pelvis                   Sacroiliac joints:                   tender 

left sacroiliac joint                   2012                   None       

        

 

                    Full Exam - General                   Constitutional        

            general 

appearance                   Overall:                   in no acute distress    

                          2012                   None                

 

                    Full Exam - General                   Constitutional        

            general 

appearance                   Overall:                   well developed          

                          2012                   None                

 

                    Full Exam - General                   Constitutional        

            general 

appearance                   Overall:                   well nourished          

                          2012                   None                

 

                    Full Exam - General                   Neurologic            

       mental status

                    Overall:                   alert                   

2 

                                        None                

 

                    Full Exam - General                   Neurologic            

       mental status

                    Overall:                   oriented                   

2012                              None                

 

                    Full Exam - General                   Psychiatric           

        mood and 

affect                    Overall:                   normal mood and affect     

 

                          2012                   None                

 

                    Full Exam - General                   Respiratory           

        auscultation

                          Right upper lung field:                   a normal exa

m       

                          2012                   None                

 

                    Full Exam - General                   Respiratory           

        auscultation

                          Right middle lung field:                   a normal ex

am      

                          2012                   None                

 

                    Full Exam - General                   Respiratory           

        auscultation

                          Right lower lung field:                   a normal exa

m       

                          2012                   None                

 

                    Full Exam - General                   Respiratory           

        auscultation

                          Left lower lung field:                   a normal exam

        

                          2012                   None                

 

                    Full Exam - General                   Respiratory           

        auscultation

                          Overall:                   breath sounds clear bilater

ally    

                          2012                   None                

 

                    Full Exam - General                   Respiratory           

        auscultation

                          Left upper lung field:                   a normal exam

        

                          2012                   None                

 

                    Full Exam - General                   Respiratory           

        auscultation

                    Diffuse:                   a normal exam                   

2012                              None                

 

                    Full Exam - General                   Cardiovascular        

           

auscultation of heart                   Overall:                   no murmurs   

                          2012                   None                

 

                    Full Exam - General                   Cardiovascular        

           

auscultation of heart                   Overall:                   regular rate 

                          2012                   None                

 

                    Full Exam - General                   Cardiovascular        

           

auscultation of heart                   Overall:                   normal heart 

sounds                    2012                   None                

 

                    Full Exam - General                   Cardiovascular        

           

auscultation of heart                   S1:                       a normal exam 

    

                          2012                   None                

 

                    Full Exam - General                   Cardiovascular        

           

auscultation of heart                   S2:                       a normal exam 

    

                          2012                   None                

 

                    Full Exam - General                   Cardiovascular        

           

auscultation of heart                   Rhythm:                   regular rhythm

                          2012                   None                

 

                    Full Exam - General                   Cardiovascular        

           

auscultation of heart                   Rate:                     regular rate  

  

                          2012                   None                

 

                    Full Exam - General                   Cardiovascular        

           

auscultation of heart                   S3 (ventricular gallop):                

                    present                   2012                   None 

               

 

                    Full Exam - General                   Cardiovascular        

           

auscultation of heart                   Murmur:                   previously 

known murmur unchanged                   2012                   None      

         

 

                    Full Exam - General                   Cardiovascular        

           

extremities                   Overall:                   no clubbing            

                          2012                   None                

 

                    Full Exam - General                   Cardiovascular        

           

extremities                   Overall:                   No edema               

                          2012                   None                

 

                    Full Exam - General                   Cardiovascular        

           

extremities                   Overall:                   No cyanosis            

                          2012                   None                

 

                    Full Exam - General                   Constitutional        

            general 

appearance                   Overall:                   in no acute distress    

                          2011                   None                

 

                    Full Exam - General                   Constitutional        

            general 

appearance                   Overall:                   well developed          

                          2011                   None                

 

                    Full Exam - General                   Constitutional        

            general 

appearance                   Overall:                   well nourished          

                          2011                   None                

 

                    Full Exam - General                   Neurologic            

       mental status

                    Overall:                   alert                   

1 

                                        None                

 

                    Full Exam - General                   Neurologic            

       mental status

                    Overall:                   oriented                   

2011                              None                

 

                    Full Exam - General                   Psychiatric           

        mood and 

affect                    Overall:                   normal mood and affect     

 

                          2011                   None                

 

                    Full Exam - General                   Cardiovascular        

           

auscultation of heart                   Overall:                   no murmurs   

                          2011                   None                

 

                    Full Exam - General                   Cardiovascular        

           

auscultation of heart                   Overall:                   regular rate 

                          2011                   None                

 

                    Full Exam - General                   Cardiovascular        

           

auscultation of heart                   Overall:                   normal heart 

sounds                    2011                   None                

 

                    Full Exam - General                   Cardiovascular        

           

auscultation of heart                   S1:                       a normal exam 

    

                          2011                   None                

 

                    Full Exam - General                   Cardiovascular        

           

auscultation of heart                   S2:                       a normal exam 

    

                          2011                   None                

 

                    Full Exam - General                   Cardiovascular        

           

auscultation of heart                   Rhythm:                   regular rhythm

                          2011                   None                

 

                    Full Exam - General                   Cardiovascular        

           

auscultation of heart                   Rate:                     regular rate  

  

                          2011                   None                

 

                    Full Exam - General                   Cardiovascular        

           

auscultation of heart                   S3 (ventricular gallop):                

                    present                   2011                   None 

               

 

                    Full Exam - General                   Respiratory           

        auscultation

                          Right upper lung field:                   a normal exa

m       

                          2011                   None                

 

                    Full Exam - General                   Respiratory           

        auscultation

                          Right middle lung field:                   a normal ex

am      

                          2011                   None                

 

                    Full Exam - General                   Respiratory           

        auscultation

                          Right lower lung field:                   a normal exa

m       

                          2011                   None                

 

                    Full Exam - General                   Respiratory           

        auscultation

                          Left lower lung field:                   a normal exam

        

                          2011                   None                

 

                    Full Exam - General                   Respiratory           

        auscultation

                          Overall:                   breath sounds clear bilater

ally    

                          2011                   None                

 

                    Full Exam - General                   Respiratory           

        auscultation

                          Left upper lung field:                   a normal exam

        

                          2011                   None                

 

                    Full Exam - General                   Respiratory           

        auscultation

                    Diffuse:                   a normal exam                   

2011                              None                

 

                    Full Exam - General                   Ears/Nose/Throat      

             

otoscopic exam                   Overall:                   tympanic membranes 

clear                     2011                   None                

 

                    Full Exam - General                   Ears/Nose/Throat      

             

internal nose                   Turbinates:                   hypertrophy       

                          2011                   None                

 

                    Full Exam - General                   Ears/Nose/Throat      

             

internal nose                   Turbinates:                   erythema          

                          2011                   None                

 

                    Full Exam - General                   Ears/Nose/Throat      

             oral 

cavity/pharynx/larynx                    Overall:                   oral mucosa 

clear                     2011                   None                

 

                    Full Exam - General                   Neck                  

 inspection of neck 

                    Overall:                   normal size                   

2011                              None                

 

                    Full Exam - General                   Neck                  

 inspection of neck 

                    Overall:                   no masses                   

2011                              None                

 

                    Full Exam - General                   Ears/Nose/Throat      

             

otoscopic exam                          Left external auditory canal:           

       

                    complete cerumen impaction                   2011     

              None  

             

 

                    Full Exam - General                   Ears/Nose/Throat      

             

otoscopic exam                          Right external auditory canal:          

       

                    complete cerumen impaction                   2011     

              None 

              

 

                    Full Exam - General                   Constitutional        

            general 

appearance                   Overall:                   in no acute distress    

                          2011                   None                

 

                    Full Exam - General                   Constitutional        

            general 

appearance                   Overall:                   well developed          

                          2011                   None                

 

                    Full Exam - General                   Constitutional        

            general 

appearance                   Overall:                   well nourished          

                          2011                   None                

 

                    Full Exam - General                   Neurologic            

       mental status

                    Overall:                   alert                   

1 

                                        None                

 

                    Full Exam - General                   Neurologic            

       mental status

                    Overall:                   oriented                   

2011                              None                

 

                    Full Exam - General                   Psychiatric           

        mood and 

affect                    Overall:                   normal mood and affect     

 

                          2011                   None                

 

                    Full Exam - General                   Respiratory           

        auscultation

                          Right upper lung field:                   a normal exa

m       

                          2011                   None                

 

                    Full Exam - General                   Respiratory           

        auscultation

                          Right middle lung field:                   a normal ex

am      

                          2011                   None                

 

                    Full Exam - General                   Respiratory           

        auscultation

                          Right lower lung field:                   a normal exa

m       

                          2011                   None                

 

                    Full Exam - General                   Respiratory           

        auscultation

                          Left lower lung field:                   a normal exam

        

                          2011                   None                

 

                    Full Exam - General                   Respiratory           

        auscultation

                          Overall:                   breath sounds clear bilater

ally    

                          2011                   None                

 

                    Full Exam - General                   Respiratory           

        auscultation

                          Left upper lung field:                   a normal exam

        

                          2011                   None                

 

                    Full Exam - General                   Respiratory           

        auscultation

                    Diffuse:                   a normal exam                   

2011                              None                

 

                    Full Exam - General                   Cardiovascular        

           

auscultation of heart                   Overall:                   no murmurs   

                          2011                   None                

 

                    Full Exam - General                   Cardiovascular        

           

auscultation of heart                   Overall:                   regular rate 

                          2011                   None                

 

                    Full Exam - General                   Cardiovascular        

           

auscultation of heart                   Overall:                   normal heart 

sounds                    2011                   None                

 

                    Full Exam - General                   Cardiovascular        

           

auscultation of heart                   S1:                       a normal exam 

    

                          2011                   None                

 

                    Full Exam - General                   Cardiovascular        

           

auscultation of heart                   S2:                       a normal exam 

    

                          2011                   None                

 

                    Full Exam - General                   Cardiovascular        

           

auscultation of heart                   Rhythm:                   regular rhythm

                          2011                   None                

 

                    Full Exam - General                   Cardiovascular        

           

auscultation of heart                   Rate:                     regular rate  

  

                          2011                   None                

 

                    Full Exam - General                   Cardiovascular        

           

auscultation of heart                   S3 (ventricular gallop):                

                    present                   2011                   None 

               

 

                    Full Exam - General                   Abdomen               

    abdominal exam  

                    Overall:                   no masses                   

2011                              None                

 

                    Full Exam - General                   Abdomen               

    abdominal exam  

                          Overall:                   normal bowel sounds        

          

                          2011                   None                

 

                    Full Exam - General                   Abdomen               

    abdominal exam  

                    Overall:                   soft                   2011

    

                                        None                

 

                    Full Exam - General                   Abdomen               

    abdominal exam  

                          Epigastric:                   tender to palpation     

          

                          2011                   None                

 

                    Full Exam - General                   Constitutional        

            general 

appearance                   Overall:                   in no acute distress    

                          2011                   None                

 

                    Full Exam - General                   Constitutional        

            general 

appearance                   Overall:                   well developed          

                          2011                   None                

 

                    Full Exam - General                   Constitutional        

            general 

appearance                   Overall:                   well nourished          

                          2011                   None                

 

                    Full Exam - General                   Neurologic            

       mental status

                    Overall:                   alert                   

1 

                                        None                

 

                    Full Exam - General                   Neurologic            

       mental status

                    Overall:                   oriented                   

2011                              None                

 

                    Full Exam - General                   Psychiatric           

        mood and 

affect                    Overall:                   normal mood and affect     

 

                          2011                   None                

 

                    Full Exam - General                   Abdomen               

    abdominal exam  

                    Overall:                   no masses                   

2011                              None                

 

                    Full Exam - General                   Abdomen               

    abdominal exam  

                    Overall:                   no tenderness                   

2011                              None                

 

                    Full Exam - General                   Abdomen               

    abdominal exam  

                          Overall:                   normal bowel sounds        

          

                          2011                   None                

 

                    Full Exam - General                   Abdomen               

    abdominal exam  

                    Overall:                   soft                   2011

    

                                        None                

 

                    Full Exam - General                   Respiratory           

        auscultation

                          Right upper lung field:                   a normal exa

m       

                          2011                   None                

 

                    Full Exam - General                   Respiratory           

        auscultation

                          Right middle lung field:                   a normal ex

am      

                          2011                   None                

 

                    Full Exam - General                   Respiratory           

        auscultation

                          Right lower lung field:                   a normal exa

m       

                          2011                   None                

 

                    Full Exam - General                   Respiratory           

        auscultation

                          Left lower lung field:                   a normal exam

        

                          2011                   None                

 

                    Full Exam - General                   Respiratory           

        auscultation

                          Overall:                   breath sounds clear bilater

ally    

                          2011                   None                

 

                    Full Exam - General                   Respiratory           

        auscultation

                          Left upper lung field:                   a normal exam

        

                          2011                   None                

 

                    Full Exam - General                   Respiratory           

        auscultation

                    Diffuse:                   a normal exam                   

2011                              None                

 

                    Full Exam - General                   Cardiovascular        

           

auscultation of heart                   Overall:                   no murmurs   

                          2011                   None                

 

                    Full Exam - General                   Cardiovascular        

           

auscultation of heart                   Overall:                   regular rate 

                          2011                   None                

 

                    Full Exam - General                   Cardiovascular        

           

auscultation of heart                   Overall:                   normal heart 

sounds                    2011                   None                

 

                    Full Exam - General                   Cardiovascular        

           

auscultation of heart                   S1:                       a normal exam 

    

                          2011                   None                

 

                    Full Exam - General                   Cardiovascular        

           

auscultation of heart                   S2:                       a normal exam 

    

                          2011                   None                

 

                    Full Exam - General                   Cardiovascular        

           

auscultation of heart                   Rhythm:                   regular rhythm

                          2011                   None                

 

                    Full Exam - General                   Cardiovascular        

           

auscultation of heart                   Rate:                     regular rate  

  

                          2011                   None                

 

                    Full Exam - General                   Cardiovascular        

           

auscultation of heart                   S3 (ventricular gallop):                

                    present                   2011                   None 

               

 

                    Full Exam - General                   Cardiovascular        

           

auscultation of heart                   Murmur:                   previously 

known murmur unchanged                   2011                   None      

         

 

                    Full Exam - General                   Cardiovascular        

           

extremities                   Overall:                   no clubbing            

                          2011                   None                

 

                    Full Exam - General                   Cardiovascular        

           

extremities                   Overall:                   No edema               

                          2011                   None                

 

                    Full Exam - General                   Cardiovascular        

           

extremities                   Overall:                   No cyanosis            

                          2011                   None                

 

                    Full Exam - General                   Cardiovascular        

           

auscultation of heart                   Overall:                   no murmurs   

                          2011                   None                

 

                    Full Exam - General                   Cardiovascular        

           

auscultation of heart                   Rate:                     regular rate  

  

                          2011                   None                

 

                    Full Exam - General                   Cardiovascular        

           

auscultation of heart                   Rhythm:                   regular rhythm

                          2011                   None                

 

                    Full Exam - General                   Cardiovascular        

           

auscultation of heart                   S1:                       a normal exam 

    

                          2011                   None                

 

                    Full Exam - General                   Cardiovascular        

           

auscultation of heart                   S2:                       a normal exam 

    

                          2011                   None                

 

                    Full Exam - General                   Cardiovascular        

           

auscultation of heart                   S3 (ventricular gallop):                

                    present                   2011                   None 

               

 

                    Full Exam - General                   Ears/Nose/Throat      

             

otoscopic exam                   Overall:                   external auditory 

canals clear                   2011                   None                

 

                    Full Exam - General                   Ears/Nose/Throat      

             

internal nose                   Turbinates:                   hypertrophy       

                          2011                   None                

 

                    Full Exam - General                   Ears/Nose/Throat      

             

internal nose                   Turbinates:                   erythema          

                          2011                   None                

 

                    Full Exam - General                   Ears/Nose/Throat      

             oral 

cavity/pharynx/larynx                    Overall:                   oral mucosa 

clear                     2011                   None                

 

                    Full Exam - General                   Constitutional        

            general 

appearance                   Overall:                   well nourished          

                          2011                   None                

 

                    Full Exam - General                   Constitutional        

            general 

appearance                   Overall:                   well developed          

                          2011                   None                

 

                    Full Exam - General                   Constitutional        

            general 

appearance                   Overall:                   in no acute distress    

                          2011                   None                

 

                    Full Exam - General                   Neurologic            

       mental status

                    Overall:                   alert                   

1 

                                        None                

 

                    Full Exam - General                   Neurologic            

       mental status

                    Overall:                   oriented                   

2011                              None                

 

                    Full Exam - General                   Psychiatric           

        mood and 

affect                    Overall:                   normal mood and affect     

 

                          2011                   None                

 

                    Full Exam - General                   Respiratory           

        auscultation

                          Overall:                   breath sounds clear bilater

ally    

                          2011                   None                

 

                    Full Exam - General                   Respiratory           

        auscultation

                    Diffuse:                   a normal exam                   

2011                              None                

 

                    Full Exam - General                   Respiratory           

        auscultation

                          Left upper lung field:                   a normal exam

        

                          2011                   None                

 

                    Full Exam - General                   Respiratory           

        auscultation

                          Left lower lung field:                   a normal exam

        

                          2011                   None                

 

                    Full Exam - General                   Respiratory           

        auscultation

                          Right upper lung field:                   a normal exa

m       

                          2011                   None                

 

                    Full Exam - General                   Respiratory           

        auscultation

                          Right middle lung field:                   a normal ex

am      

                          2011                   None                

 

                    Full Exam - General                   Respiratory           

        auscultation

                          Right lower lung field:                   a normal exa

m       

                          2011                   None                

 

                    Full Exam - General                   Cardiovascular        

           

auscultation of heart                   Overall:                   regular rate 

                          2011                   None                

 

                    Full Exam - General                   Cardiovascular        

           

auscultation of heart                   Overall:                   normal heart 

sounds                    2011                   None                

 

                    Full Exam - General                   Constitutional        

            general 

appearance                   Overall:                   well nourished          

                          2011                   None                

 

                    Full Exam - General                   Constitutional        

            general 

appearance                   Overall:                   well developed          

                          2011                   None                

 

                    Full Exam - General                   Constitutional        

            general 

appearance                   Overall:                   in no acute distress    

                          2011                   None                

 

                    Full Exam - General                   Neurologic            

       mental status

                    Overall:                   alert                   

1 

                                        None                

 

                    Full Exam - General                   Neurologic            

       mental status

                    Overall:                   oriented                   

2011                              None                

 

                    Full Exam - General                   Psychiatric           

        mood and 

affect                    Overall:                   normal mood and affect     

 

                          2011                   None                

 

                    Full Exam - General                   Musculoskeletal       

            spine, 

ribs and pelvis                   Spine:                    tender @ lumbar spin

e

                          2011                   None                

 

                    Full Exam - General                   Musculoskeletal       

            spine, 

ribs and pelvis                   Sacroiliac joints:                   tender 

right sacroiliac joint                   2011                   None      

         

 

                    Full Exam - General                   Musculoskeletal       

            spine, 

ribs and pelvis                   Sacroiliac joints:                   tender 

right sacroiliac joint                   2011                   None      

         

 

                    Full Exam - General                   Musculoskeletal       

            spine, 

ribs and pelvis                   Sacroiliac joints:                   tender 

left sacroiliac joint                   2011                   None       

        

 

                    Full Exam - General                   Respiratory           

        auscultation

                          Overall:                   breath sounds clear bilater

ally    

                          2011                   None                

 

                    Full Exam - General                   Respiratory           

        auscultation

                    Diffuse:                   a normal exam                   

2011                              None                

 

                    Full Exam - General                   Respiratory           

        auscultation

                          Left upper lung field:                   a normal exam

        

                          2011                   None                

 

                    Full Exam - General                   Respiratory           

        auscultation

                          Left lower lung field:                   a normal exam

        

                          2011                   None                

 

                    Full Exam - General                   Respiratory           

        auscultation

                          Right upper lung field:                   a normal exa

m       

                          2011                   None                

 

                    Full Exam - General                   Respiratory           

        auscultation

                          Right middle lung field:                   a normal ex

am      

                          2011                   None                

 

                    Full Exam - General                   Respiratory           

        auscultation

                          Right lower lung field:                   a normal exa

m       

                          2011                   None                

 

                    Full Exam - General                   Cardiovascular        

           

auscultation of heart                   Overall:                   regular rate 

                          2011                   None                

 

                    Full Exam - General                   Cardiovascular        

           

auscultation of heart                   Overall:                   normal heart 

sounds                    2011                   None                

 

                    Full Exam - General                   Cardiovascular        

           

auscultation of heart                   Overall:                   no murmurs   

                          2011                   None                

 

                    Full Exam - General                   Cardiovascular        

           

auscultation of heart                   Rate:                     regular rate  

  

                          2011                   None                

 

                    Full Exam - General                   Cardiovascular        

           

auscultation of heart                   Rhythm:                   regular rhythm

                          2011                   None                

 

                    Full Exam - General                   Constitutional        

            general 

appearance                   Overall:                   well nourished          

                          2011                   None                

 

                    Full Exam - General                   Constitutional        

            general 

appearance                   Overall:                   well developed          

                          2011                   None                

 

                    Full Exam - General                   Constitutional        

            general 

appearance                   Overall:                   in no acute distress    

                          2011                   None                

 

                    Full Exam - General                   Neurologic            

       mental status

                    Overall:                   alert                   

1 

                                        None                

 

                    Full Exam - General                   Neurologic            

       mental status

                    Overall:                   oriented                   

2011                              None                

 

                    Full Exam - General                   Psychiatric           

        mood and 

affect                    Overall:                   normal mood and affect     

 

                          2011                   None                

 

                    Full Exam - General                   Musculoskeletal       

            spine, 

ribs and pelvis                   Spine:                    tender @ lumbar spin

e

                          2011                   None                

 

                    Full Exam - General                   Cardiovascular        

           

auscultation of heart                   S1:                       a normal exam 

    

                          2011                   None                

 

                    Full Exam - General                   Cardiovascular        

           

auscultation of heart                   S2:                       a normal exam 

    

                          2011                   None                

 

                    Full Exam - General                   Cardiovascular        

           

auscultation of heart                   S3 (ventricular gallop):                

                    present                   2011                   None 

               

 

                    Full Exam - General                   Eyes                  

 conjunctiva/eyelids

                    Overall:                   cornea clear                   

2010                              None                

 

                    Full Exam - General                   Eyes                  

 conjunctiva/eyelids

                    Overall:                   eyelids normal                   

2010                              None                

 

                    Full Exam - General                   Eyes                  

 pupils and irises  

                          Overall:                   pupils equal, round, reacti

ve to 

light and accomodation                   2010                   None      

         

 

                    Full Exam - General                   Ears/Nose/Throat      

             

external ear                   Overall:                   normal appearance     

                          2010                   None                

 

                    Full Exam - General                   Ears/Nose/Throat      

             

otoscopic exam                          Left external auditory canal:           

       

                    complete cerumen impaction                   2010     

              None  

             

 

                    Full Exam - General                   Ears/Nose/Throat      

             

otoscopic exam                          Right external auditory canal:          

       

                    complete cerumen impaction                   2010     

              None 

              

 

                    Full Exam - General                   Ears/Nose/Throat      

             

otoscopic exam                   Left tympanic membrane:                   not 

visualized                   2010                   None                

 

                    Full Exam - General                   Ears/Nose/Throat      

             

otoscopic exam                   Right tympanic membrane:                   not 

visualized                   2010                   None                

 

                    Full Exam - General                   Ears/Nose/Throat      

             

lips/teeth/gingiva                   Overall:                   benign lips     

                          2010                   None                

 

                    Full Exam - General                   Ears/Nose/Throat      

             

lips/teeth/gingiva                   Overall:                   benign gingiva  

                          2010                   None                

 

                    Full Exam - General                   Ears/Nose/Throat      

             

lips/teeth/gingiva                   Overall:                   normal dentition

                          2010                   None                

 

                    Full Exam - General                   Ears/Nose/Throat      

             oral 

cavity/pharynx/larynx                    Overall:                   tonsils 

benign                    2010                   None                

 

                    Full Exam - General                   Ears/Nose/Throat      

             oral 

cavity/pharynx/larynx                    Oropharynx:                   erythema 

                          2010                   None                

 

                    Full Exam - General                   Respiratory           

        auscultation

                          Overall:                   breath sounds clear bilater

ally    

                          2010                   None                

 

                    Full Exam - General                   Respiratory           

        respiratory 

effort/rhythm                   Overall:                   no retractions       

                          2010                   None                

 

                    Full Exam - General                   Respiratory           

        respiratory 

effort/rhythm                   Overall:                   normal rate          

                          2010                   occasionally coughs durin

g the exam        

       

 

                    Full Exam - General                   Cardiovascular        

           

auscultation of heart                   Overall:                   regular rate 

                          2010                   None                

 

                    Full Exam - General                   Cardiovascular        

           

auscultation of heart                   Overall:                   normal heart 

sounds                    2010                   None                

 

                    Full Exam - General                   Psychiatric           

        

orientation/consciousness                   Overall:                   oriented 

to person, place and time                   2010                   None   

            

 

                    Full Exam - General                   Constitutional        

            general 

appearance                   Overall:                   well nourished          

                          2010                   None                

 

                    Full Exam - General                   Constitutional        

            general 

appearance                   Overall:                   well developed          

                          2010                   None                

 

                    Full Exam - General                   Constitutional        

            general 

appearance                   Overall:                   in no acute distress    

                          2010                   None                

 

                    Full Exam - General                   Neurologic            

       mental status

                    Overall:                   alert                   

0 

                                        None                

 

                    Full Exam - General                   Cardiovascular        

           

auscultation of heart                   Rate:                     regular rate  

  

                          2010                   None                

 

                    Full Exam - General                   Cardiovascular        

           

auscultation of heart                   Rhythm:                   regular rhythm

                          2010                   None                

 

                    Full Exam - General                   Respiratory           

        auscultation

                          Right middle lung field:                   a normal ex

am      

                          2010                   None                

 

                    Full Exam - General                   Respiratory           

        auscultation

                          Right lower lung field:                   a normal exa

m       

                          2010                   None                

 

                    Full Exam - General                   Cardiovascular        

           

auscultation of heart                   Overall:                   regular rate 

                          2010                   None                

 

                    Full Exam - General                   Cardiovascular        

           

auscultation of heart                   Overall:                   normal heart 

sounds                    2010                   None                

 

                    Full Exam - General                   Cardiovascular        

           

auscultation of heart                   Overall:                   no murmurs   

                          2010                   None                

 

                    Full Exam - General                   Cardiovascular        

           

auscultation of heart                   S1:                       a normal exam 

    

                          2010                   None                

 

                    Full Exam - General                   Cardiovascular        

           

auscultation of heart                   S2:                       a normal exam 

    

                          2010                   None                

 

                    Full Exam - General                   Cardiovascular        

           

auscultation of heart                   S3 (ventricular gallop):                

                    present                   2010                   None 

               

 

                    Full Exam - General                   Abdomen               

    abdominal exam  

                    Overall:                   no tenderness                   

2010                              None                

 

                    Full Exam - General                   Neurologic            

       mental status

                    Overall:                   oriented                   

2010                              None                

 

                    Full Exam - General                   Psychiatric           

        mood and 

affect                    Overall:                   normal mood and affect     

 

                          2010                   None                

 

                    Full Exam - General                   Abdomen               

    abdominal exam  

                    Overall:                   no masses                   

2010                              None                

 

                    Full Exam - General                   Abdomen               

    abdominal exam  

                          Overall:                   normal bowel sounds        

          

                          2010                   None                

 

                    Full Exam - General                   Abdomen               

    abdominal exam  

                    Overall:                   soft                   2010

    

                                        None                

 

                    Full Exam - General                   Respiratory           

        auscultation

                          Overall:                   breath sounds clear bilater

ally    

                          2010                   None                

 

                    Full Exam - General                   Respiratory           

        auscultation

                    Diffuse:                   a normal exam                   

2010                              None                

 

                    Full Exam - General                   Respiratory           

        auscultation

                          Left upper lung field:                   a normal exam

        

                          2010                   None                

 

                    Full Exam - General                   Respiratory           

        auscultation

                          Left lower lung field:                   a normal exam

        

                          2010                   None                

 

                    Full Exam - General                   Respiratory           

        auscultation

                          Right upper lung field:                   a normal exa

m       

                          2010                   None                

 

                    Full Exam - General                   Neurologic            

       mental status

                    Overall:                   oriented                   

2010                              None                

 

                    Full Exam - General                   Psychiatric           

        mood and 

affect                   Mood:                   depressed                   

2010                              tearful                

 

                    Full Exam - General                   Neurologic            

       mental status

                    Overall:                   alert                   

0 

                                        None                

 

                    Full Exam - General                   Cardiovascular        

           

auscultation of heart                   Rate:                     regular rate  

  

                          2010                   None                

 

                    Full Exam - General                   Cardiovascular        

           

auscultation of heart                   Rhythm:                   regular rhythm

                          2010                   None                

 

                    Full Exam - General                   Cardiovascular        

           

auscultation of heart                   S1:                       a normal exam 

    

                          2010                   None                

 

                    Full Exam - General                   Cardiovascular        

           

auscultation of heart                   S2:                       a normal exam 

    

                          2010                   None                

 

                    Full Exam - General                   Cardiovascular        

           

auscultation of heart                   S3 (ventricular gallop):                

                    present                   2010                   None 

               

 

                    Full Exam - General                   Cardiovascular        

           

extremities                   Overall:                   no clubbing            

                          2010                   None                

 

                    Full Exam - General                   Cardiovascular        

           

extremities                   Overall:                   No edema               

                          2010                   None                

 

                    Full Exam - General                   Cardiovascular        

           

extremities                   Overall:                   No cyanosis            

                          2010                   None                

 

                    Full Exam - General                   Psychiatric           

        mood and 

affect                   Mood:                   anxious                   

2010                              ringing hands                

 

                    Full Exam - General                   Psychiatric           

        mood and 

affect                    Affect:                   constricted                 

 

                          2010                   None                

 

                    Full Exam - General                   Respiratory           

        auscultation

                          Overall:                   breath sounds clear bilater

ally    

                          2010                   None                

 

                    Full Exam - General                   Respiratory           

        auscultation

                    Diffuse:                   a normal exam                   

2010                              None                

 

                    Full Exam - General                   Respiratory           

        auscultation

                          Left upper lung field:                   a normal exam

        

                          2010                   None                

 

                    Full Exam - General                   Respiratory           

        auscultation

                          Left lower lung field:                   a normal exam

        

                          2010                   None                

 

                    Full Exam - General                   Respiratory           

        auscultation

                          Right upper lung field:                   a normal exa

m       

                          2010                   None                

 

                    Full Exam - General                   Respiratory           

        auscultation

                          Right middle lung field:                   a normal ex

am      

                          2010                   None                

 

                    Full Exam - General                   Respiratory           

        auscultation

                          Right lower lung field:                   a normal exa

m       

                          2010                   None                

 

                    Full Exam - General                   Cardiovascular        

           

auscultation of heart                   Overall:                   regular rate 

                          2010                   None                

 

                    Full Exam - General                   Cardiovascular        

           

auscultation of heart                   Overall:                   normal heart 

sounds                    2010                   None                

 

                    Full Exam - General                   Cardiovascular        

           

auscultation of heart                   Overall:                   no murmurs   

                          2010                   None                

 

                    Full Exam - General                   Constitutional        

            general 

appearance                   Overall:                   well nourished          

                          2010                   None                

 

                    Full Exam - General                   Constitutional        

            general 

appearance                   Overall:                   well developed          

                          2010                   None                

 

                    Full Exam - General                   Constitutional        

            general 

appearance                   Overall:                   in no acute distress    

                          2010                   None                

 

                    Full Exam - General                   Psychiatric           

        mood and 

affect                    Overall:                   normal mood and affect     

 

                          2010                   None                

 

                    Full Exam - General                   Respiratory           

        auscultation

                          Right middle lung field:                   a normal ex

am      

                          2010                   None                

 

                    Full Exam - General                   Abdomen               

    abdominal exam  

                    Overall:                   no masses                   

2010                              None                

 

                    Full Exam - General                   Abdomen               

    abdominal exam  

                          Overall:                   normal bowel sounds        

          

                          2010                   None                

 

                    Full Exam - General                   Constitutional        

            general 

appearance                   Overall:                   well nourished          

                          2010                   None                

 

                    Full Exam - General                   Constitutional        

            general 

appearance                   Overall:                   well developed          

                          2010                   None                

 

                    Full Exam - General                   Constitutional        

            general 

appearance                   Overall:                   in no acute distress    

                          2010                   None                

 

                    Full Exam - General                   Respiratory           

        auscultation

                          Overall:                   breath sounds clear bilater

ally    

                          2010                   None                

 

                    Full Exam - General                   Respiratory           

        auscultation

                    Diffuse:                   a normal exam                   

2010                              None                

 

                    Full Exam - General                   Respiratory           

        auscultation

                          Left upper lung field:                   a normal exam

        

                          2010                   None                

 

                    Full Exam - General                   Respiratory           

        auscultation

                          Left lower lung field:                   a normal exam

        

                          2010                   None                

 

                    Full Exam - General                   Respiratory           

        auscultation

                          Right upper lung field:                   a normal exa

m       

                          2010                   None                

 

                    Full Exam - General                   Respiratory           

        auscultation

                          Right lower lung field:                   a normal exa

m       

                          2010                   None                

 

                    Full Exam - General                   Cardiovascular        

           

auscultation of heart                   Overall:                   regular rate 

                          2010                   None                

 

                    Full Exam - General                   Cardiovascular        

           

auscultation of heart                   Overall:                   normal heart 

sounds                    2010                   None                

 

                    Full Exam - General                   Cardiovascular        

           

auscultation of heart                   Overall:                   no murmurs   

                          2010                   None                

 

                    Full Exam - General                   Cardiovascular        

           

auscultation of heart                   Rate:                     regular rate  

  

                          2010                   None                

 

                    Full Exam - General                   Cardiovascular        

           

auscultation of heart                   Rhythm:                   regular rhythm

                          2010                   None                

 

                    Full Exam - General                   Cardiovascular        

           

auscultation of heart                   S1:                       a normal exam 

    

                          2010                   None                

 

                    Full Exam - General                   Abdomen               

    abdominal exam  

                    Overall:                   soft                   2010

    

                                        None                

 

                    Full Exam - General                   Abdomen               

    abdominal exam  

                          Epigastric:                   tender to palpation     

          

                          2010                   None                

 

                    Full Exam - General                   Neurologic            

       mental status

                    Overall:                   alert                   

0 

                                        None                

 

                    Full Exam - General                   Neurologic            

       mental status

                    Overall:                   oriented                   

2010                              None                

 

                    Full Exam - General                   Cardiovascular        

           

auscultation of heart                   S2:                       a normal exam 

    

                          2010                   None                

 

                    Full Exam - General                   Cardiovascular        

           

extremities                   Overall:                   no clubbing            

                          2010                   None                

 

                    Full Exam - General                   Cardiovascular        

           

extremities                   Overall:                   No edema               

                          2010                   None                

 

                    Full Exam - General                   Cardiovascular        

           

extremities                   Overall:                   No cyanosis            

                          2010                   None                

 

                    Full Exam - General                   Constitutional        

            general 

appearance                   Overall:                   well nourished          

                          2010                   None                

 

                    Full Exam - General                   Constitutional        

            general 

appearance                   Overall:                   well developed          

                          2010                   None                

 

                    Full Exam - General                   Constitutional        

            general 

appearance                   Overall:                   in no acute distress    

                          2010                   None                

 

                    Full Exam - General                   Respiratory           

        auscultation

                          Overall:                   breath sounds clear bilater

ally    

                          2010                   None                

 

                    Full Exam - General                   Respiratory           

        auscultation

                    Diffuse:                   a normal exam                   

2010                              None                

 

                    Full Exam - General                   Respiratory           

        auscultation

                          Left upper lung field:                   a normal exam

        

                          2010                   None                

 

                    Full Exam - General                   Respiratory           

        auscultation

                          Left lower lung field:                   a normal exam

        

                          2010                   None                

 

                    Full Exam - General                   Respiratory           

        auscultation

                          Right upper lung field:                   a normal exa

m       

                          2010                   None                

 

                    Full Exam - General                   Respiratory           

        auscultation

                          Right middle lung field:                   a normal ex

am      

                          2010                   None                

 

                    Full Exam - General                   Respiratory           

        auscultation

                          Right lower lung field:                   a normal exa

m       

                          2010                   None                

 

                    Full Exam - General                   Cardiovascular        

           

auscultation of heart                   Overall:                   regular rate 

                          2010                   None                

 

                    Full Exam - General                   Cardiovascular        

           

auscultation of heart                   Overall:                   normal heart 

sounds                    2010                   None                

 

                    Full Exam - General                   Cardiovascular        

           

auscultation of heart                   Murmur:                   previously 

known murmur unchanged                   2010                   None      

         

 

                    Full Exam - General                   Cardiovascular        

           

extremities                   Overall:                   no clubbing            

                          2010                   None                

 

                    Full Exam - General                   Cardiovascular        

           

extremities                   Overall:                   No cyanosis            

                          2010                   None                

 

                    Full Exam - General                   Cardiovascular        

           

extremities                   Edema present:                   severity 1+ - 4+:

trace                     2010                   None                

 

                    Full Exam - General                   Abdomen               

    abdominal exam  

                    Overall:                   no masses                   

2010                              None                

 

                    Full Exam - General                   Abdomen               

    abdominal exam  

                    Overall:                   no tenderness                   

2010                              None                

 

                    Full Exam - General                   Abdomen               

    abdominal exam  

                          Overall:                   normal bowel sounds        

          

                          2010                   None                

 

                    Full Exam - General                   Abdomen               

    abdominal exam  

                    Overall:                   soft                   2010

    

                                        None                

 

                    Full Exam - General                   Neurologic            

       mental status

                    Overall:                   alert                   

0 

                                        None                

 

                    Full Exam - General                   Neurologic            

       mental status

                    Overall:                   oriented                   

2010                              None                

 

                    Full Exam - General                   Psychiatric           

        mood and 

affect                   Affect:                   flat                   

2010                              None                



                                                                              



Procedures

                      



                    Procedure                   Codes                   Date    

            

 

                                              ROUTINE VENIPUNCTURE              

                       

CPT-4: 84206                            06/10/2019                

 

                                                            ASSAY THYROID STIM H

ORMONE                                  

                          CPT-4: 63142                   06/10/2019             

   

 

                                                            COMPREHEN METABOLIC 

PANEL                                   

                          CPT-4: 90182                   06/10/2019             

   

 

                                                            COMPLETE CBC W/AUTO 

DIFF WBC                                

                          CPT-4: 20783                   06/10/2019             

   

 

                                                            URINALYSIS NONAUTO W

/O SCOPE                                

                          CPT-4: 05053                   2019             

   

 

                                                            URINE CULTURE/ COLON

Y COUNT                                 

                          CPT-4: 81206                   2019             

   

 

                                                            URINE CULTURE/ COLON

Y COUNT                                 

                          CPT-4: 06050                   10/02/2018             

   

 

                                              ROUTINE VENIPUNCTURE              

                       

CPT-4: 26060                            2018                

 

                                              ASSAY OF FREE THYROXINE           

                          

CPT-4: 12681                            2018                

 

                                                            ASSAY THYROID STIM H

ORMONE                                  

                          CPT-4: 29224                   2018             

   

 

                                                            COMPLETE CBC W/AUTO 

DIFF WBC                                

                          CPT-4: 99815                   2018             

   

 

                                                            METABOLIC PANEL TOTA

L CA                                    

                          CPT-4: 19468                   2018             

   

 

                                                            FLU VACC PRSV FREE I

NC ANTIG 65 AND OLDER                   

                          CPT-4: 28810                   2018             

   

 

                                                            ASSAY, GLUCOSE, BLOO

D QUANT                                 

                          CPT-4: 96357                   2018             

   

 

                                                            ADMIN INFLUENZA VIRU

S VAC                                   

                          CPT-4:                    2018             

   

 

                                              ROUTINE VENIPUNCTURE              

                       

CPT-4: 10703                            2018                

 

                                                            COMPREHEN METABOLIC 

PANEL                                   

                          CPT-4: 16124                   2018             

   

 

                                                            COMPLETE CBC W/AUTO 

DIFF WBC                                

                          CPT-4: 34162                   2018             

   

 

                                              A1C HPLC                          

           CPT-4: 65109   

                                        2018                

 

                                              ASSAY OF TROPONIN QUANT           

                          

CPT-4: 60155                            2018                

 

                                              LIPID PANEL                       

              CPT-4: 22753

                                        2018                

 

                                                            THER/PROPH/DIAG INJ 

SC/IM                                   

                          CPT-4: 57314                   2018             

   

 

                                                            TRIAMCINOLONE ACET I

N[place] NOS                                  

                          CPT-4:                    2018             

   

 

                                                            URINALYSIS NONAUTO W

/O SCOPE                                

                          CPT-4: 33105                   2018             

   

 

                                                            URINE CULTURE/ COLON

Y COUNT                                 

                          CPT-4: 85163                   2018             

   

 

                                                            FLU VACC PRSV FREE I

NC ANTIG 65 AND OLDER                   

                          CPT-4: 39427                   10/06/2017             

   

 

                                                            ADMIN INFLUENZA VIRU

S VAC                                   

                          CPT-4:                    10/06/2017             

   

 

                                              ROUTINE VENIPUNCTURE              

                       

CPT-4: 16340                            2017                

 

                                                            COMPREHEN METABOLIC 

PANEL                                   

                          CPT-4: 10581                   2017             

   

 

                                                            COMPLETE CBC W/AUTO 

DIFF WBC                                

                          CPT-4: 27702                   2017             

   

 

                                              LIPID PANEL                       

              CPT-4: 70504

                                        2017                

 

                                              A1C HPLC                          

           CPT-4: 36377   

                                        2017                

 

                                                            ASSAY THYROID STIM H

ORMONE                                  

                          CPT-4: 09798                   2017             

   

 

                                              ROUTINE VENIPUNCTURE              

                       

CPT-4: 27827                            2017                

 

                                                            ASSAY THYROID STIM H

ORMONE                                  

                          CPT-4: 69575                   2017             

   

 

                                                            COMPREHEN METABOLIC 

PANEL                                   

                          CPT-4: 48790                   2017             

   

 

                                                            COMPLETE CBC W/AUTO 

DIFF WBC                                

                          CPT-4: 20224                   2017             

   

 

                                              A1C HPLC                          

           CPT-4: 96744   

                                        2017                

 

                                                            FLU VACC PRSV FREE I

NC ANTIG 64 AND OLDER                   

                          CPT-4: 81179                   10/07/2016             

   

 

                                                            ADMIN INFLUENZA VIRU

S VAC                                   

                          CPT-4:                    10/07/2016             

   

 

                                              ROUTINE VENIPUNCTURE              

                       

CPT-4: 26820                            2016                

 

                                              ASSAY OF FREE THYROXINE           

                          

CPT-4: 99090                            2016                

 

                                                            ASSAY THYROID STIM H

ORMONE                                  

                          CPT-4: 49968                   2016             

   

 

                                                            COMPREHEN METABOLIC 

PANEL                                   

                          CPT-4: 33815                   2016             

   

 

                                                            COMPLETE CBC W/AUTO 

DIFF WBC                                

                          CPT-4: 54520                   2016             

   

 

                                              LIPID PANEL                       

              CPT-4: 75420

                                        2016                

 

                                              A1C HPLC                          

           CPT-4: 27989   

                                        2016                

 

                                                            URINALYSIS NONAUTO W

/O SCOPE                                

                          CPT-4: 90194                   2016             

   

 

                                              ROUTINE VENIPUNCTURE              

                       

CPT-4: 29406                            2015                

 

                                                            METABOLIC PANEL TOTA

L CA                                    

                          CPT-4: 33055                   2015             

   

 

                                                            PRESCRIP TRANSMIT VI

A ERX SY                                

                          CPT-4:                    2015             

   

 

                                                            FLU VACC PRSV FREE I

NC ANTIG 64 AND OLDER                   

                          CPT-4: 28062                   10/16/2015             

   

 

                                                            ADMIN INFLUENZA VIRU

S VAC                                   

                          CPT-4:                    10/16/2015             

   

 

                                                            URINALYSIS NONAUTO W

/O SCOPE                                

                          CPT-4: 43248                   09/15/2015             

   

 

                                                            URINE CULTURE/ COLON

Y COUNT                                 

                          CPT-4: 88550                   09/15/2015             

   

 

                                              ROUTINE VENIPUNCTURE              

                       

CPT-4: 58887                            09/10/2015                

 

                                              ASSAY OF FREE THYROXINE           

                          

CPT-4: 13651                            09/10/2015                

 

                                                            ASSAY THYROID STIM H

ORMONE                                  

                          CPT-4: 37766                   09/10/2015             

   

 

                                                            COMPREHEN METABOLIC 

PANEL                                   

                          CPT-4: 37558                   09/10/2015             

   

 

                                                            COMPLETE CBC W/AUTO 

DIFF WBC                                

                          CPT-4: 66519                   09/10/2015             

   

 

                                              LIPID PANEL                       

              CPT-4: 20835

                                        09/10/2015                

 

                                              A1C HPLC                          

           CPT-4: 76917   

                                        09/10/2015                

 

                                              CERUM REMOVAL                     

                CPT-4: 

55397                                   2015                

 

                                                            PRESCRIP TRANSMIT VI

A ERX SY                                

                          CPT-4:                    2015             

   

 

                                              FLUZONE, 5ML (Medicare)           

                          

CPT-4:                             10/17/2014                

 

                                                            ADMIN INFLUENZA VIRU

S VAC                                   

                          CPT-4:                    10/17/2014             

   

 

                                                            PRESCRIP TRANSMIT VI

A ERX SY                                

                          CPT-4:                    2014             

   

 

                                                            PRESCRIP TRANSMIT VI

A ERX SY                                

                          CPT-4:                    2014             

   

 

                                                            PRESCRIP TRANSMIT VI

A ERX SY                                

                          CPT-4:                    2014             

   

 

                                              ROUTINE VENIPUNCTURE              

                       

CPT-4: 49905                            2014                

 

                                              ASSAY OF FREE THYROXINE           

                          

CPT-4: 32378                            2014                

 

                                                            ASSAY THYROID STIM H

ORMONE                                  

                          CPT-4: 57482                   2014             

   

 

                                                            COMPREHEN METABOLIC 

PANEL                                   

                          CPT-4: 20684                   2014             

   

 

                                                            COMPLETE CBC W/AUTO 

DIFF WBC                                

                          CPT-4: 74795                   2014             

   

 

                                              LIPID PANEL                       

              CPT-4: 02264

                                        2014                

 

                                              A1C HPLC                          

           CPT-4: 54764   

                                        2014                

 

                                              VITAMIN B 12 FOLIC ACID           

                          

CPT-4: 06744|62001                      2014                

 

                                                            PRESCRIP TRANSMIT VI

A ERX SY                                

                          CPT-4:                    2014             

   

 

                                                            PRESCRIP TRANSMIT VI

A ERX SY                                

                          CPT-4:                    10/15/2013             

   

 

                                              FLUZONE, 5ML (Medicare)           

                          

CPT-4:                             2013                

 

                                                            ADMIN INFLUENZA VIRU

S VAC                                   

                          CPT-4:                    2013             

   

 

                                                            PRESCRIP TRANSMIT VI

A ERX SY                                

                          CPT-4:                    2013             

   

 

                                                            PRESCRIP TRANSMIT VI

A ERX SY                                

                          CPT-4:                    05/10/2013             

   

 

                                              ROUTINE VENIPUNCTURE              

                       

CPT-4: 10930                            2012                

 

                                              ASSAY OF FREE THYROXINE           

                          

CPT-4: 63393                            2012                

 

                                                            ASSAY THYROID STIM H

ORMONE                                  

                          CPT-4: 09726                   2012             

   

 

                                                            COMPREHEN METABOLIC 

PANEL                                   

                          CPT-4: 39709                   2012             

   

 

                                                            COMPLETE CBC W/AUTO 

DIFF WBC                                

                          CPT-4: 91661                   2012             

   

 

                                              LIPID PANEL                       

              CPT-4: 87559

                                        2012                

 

                                                            A1C GLYCOSYLATED HEM

OGLOBIN TEST                            

                          CPT-4: 92625                   2012             

   

 

                                              CERUM REMOVAL                     

                CPT-4: 

80375                                   2012                

 

                                                            PRESCRIP TRANSMIT VI

A ERX SY                                

                          CPT-4:                    2012             

   

 

                                                            PRESCRIP TRANSMIT VI

A ERX SY                                

                          CPT-4:                    10/10/2012             

   

 

                                              FLUZONE, 5ML (Medicare)           

                          

CPT-4:                             2012                

 

                                                            ADMIN INFLUENZA VIRU

S VAC                                   

                          CPT-4:                    2012             

   

 

                                                            ASSAY, GLUCOSE, BLOO

D QUANT                                 

                          CPT-4: 03326                   2012             

   

 

                                                            URINALYSIS NONAUTO W

/O SCOPE                                

                          CPT-4: 54340                   2012             

   

 

                                                            URINE CULTURE/ COLON

Y COUNT                                 

                          CPT-4: 28824                   2012             

   

 

                                              ROUTINE VENIPUNCTURE              

                       

CPT-4: 70518                            2012                

 

                                              ASSAY OF FREE THYROXINE           

                          

CPT-4: 77022                            2012                

 

                                                            ASSAY THYROID STIM H

ORMONE                                  

                          CPT-4: 34074                   2012             

   

 

                                                            COMPREHEN METABOLIC 

PANEL                                   

                          CPT-4: 13610                   2012             

   

 

                                                            COMPLETE CBC W/AUTO 

DIFF WBC                                

                          CPT-4: 14942                   2012             

   

 

                                              LIPID PANEL                       

              CPT-4: 34846

                                        2012                

 

                                              ASSAY OF INSULIN                  

                   CPT-4: 

79074                                   2012                

 

                                                            A1C GLYCOSYLATED HEM

OGLOBIN TEST                            

                          CPT-4: 90330                   2012             

   

 

                                                            DRAIN/INJECT JOINT/B

URSA                                    

                          CPT-4: 30004                   2012             

   

 

                                                            METHYLPREDNISOLONE 4

0 MG INJ                                

                          CPT-4:                    2012             

   

 

                                                            TRIAMCINOLONE ACET I

N[place] NOS                                  

                          CPT-4:                    2012             

   

 

                                                            PRESCRIP TRANSMIT VI

A ERX SY                                

                          CPT-4:                    2012             

   

 

                                                            PRESCRIP TRANSMIT VI

A ERX SY                                

                          CPT-4:                    2012             

   

 

                                                            METHYLPREDNISOLONE 4

0 MG INJ                                

                          CPT-4:                    2012             

   

 

                                                            DRAIN/INJECT JOINT/B

URSA                                    

                          CPT-4: 98703                   2012             

   

 

                                                            TRIAMCINOLONE ACET I

N[place] NOS                                  

                          CPT-4:                    2012             

   

 

                                                            PRESCRIP TRANSMIT VI

A ERX SY                                

                          CPT-4:                    2012             

   

 

                                              ROUTINE VENIPUNCTURE              

                       

CPT-4: 63056                            2012                

 

                                              ASSAY OF FREE THYROXINE           

                          

CPT-4: 08523                            2012                

 

                                                            ASSAY THYROID STIM H

ORMONE                                  

                          CPT-4: 32995                   2012             

   

 

                                                            COMPREHEN METABOLIC 

PANEL                                   

                          CPT-4: 87746                   2012             

   

 

                                                            COMPLETE CBC W/AUTO 

DIFF WBC                                

                          CPT-4: 22813                   2012             

   

 

                                              LIPID PANEL                       

              CPT-4: 07793

                                        2012                

 

                                                            PRESCRIP TRANSMIT VI

A ERX SY                                

                          CPT-4:                    2012             

   

 

                                              CERUM REMOVAL                     

                CPT-4: 

71223                                   2011                

 

                                                            PRESCRIP TRANSMIT VI

A ERX SY                                

                          CPT-4:                    2011             

   

 

                                              FLUZONE, 5ML (Medicare)           

                          

CPT-4:                             10/05/2011                

 

                                                            ADMIN INFLUENZA VIRU

S VAC                                   

                          CPT-4:                    10/05/2011             

   

 

                                                            PRESCRIP TRANSMIT VI

A ERX SY                                

                          CPT-4:                    2011             

   

 

                                                            URINALYSIS NONAUTO W

/O SCOPE                                

                          CPT-4: 84183                   2011             

   

 

                                                            URINE CULTURE/ COLON

Y COUNT                                 

                          CPT-4: 89778                   2011             

   

 

                                              CUR TOBACCO NON-USER              

                       

CPT-4:                             2011                

 

                                              ROUTINE VENIPUNCTURE              

                       

CPT-4: 79549                            2011                

 

                                                            COMPLETE CBC W/AUTO 

DIFF WBC                                

                          CPT-4: 28884                   2011             

   

 

                                                            COMPREHEN METABOLIC 

PANEL                                   

                          CPT-4: 61770                   2011             

   

 

                                              LIPID PANEL                       

              CPT-4: 51118

                                        2011                

 

                                                            ASSAY THYROID STIM H

ORMONE                                  

                          CPT-4: 23764                   2011             

   

 

                                              ASSAY OF FREE THYROXINE           

                          

CPT-4: 87176                            2011                

 

                                                            PRESCRIP TRANSMIT VI

A ERX SY                                

                          CPT-4:                    2011             

   

 

                                                            INJ TRIGGER POINT 1/

2 MUSCL                                 

                          CPT-4: 69322                   2011             

   

 

                                                            TRIAMCINOLONE ACET I

N[place] NOS                                  

                          CPT-4:                    2011             

   

 

                                                            METHYLPREDNISOLONE 4

0 MG INJ                                

                          CPT-4:                    2011             

   

 

                                                            THER/PROPH/DIAG INJ 

SC/IM                                   

                          CPT-4: 48205                   2011             

   

 

                                                            KETOROLAC TROMETHAMI

NE INJ                                  

                          CPT-4:                    2011             

   

 

                                                            PRESCRIP TRANSMIT VI

A ERX SY                                

                          CPT-4:                    2010             

   

 

                                                            FLU VACCINE 3 YRS & 

> IM UP 63                              

                          CPT-4: 12145                   10/06/2010             

   

 

                                                            ADMIN INFLUENZA VIRU

S VAC                                   

                          CPT-4:                    10/06/2010             

   

 

                                                            URINALYSIS NONAUTO W

/O SCOPE                                

                          CPT-4: 52759                   2010             

   

 

                                                            URINE CULTURE/ COLON

Y COUNT                                 

                          CPT-4: 44927                   2010             

   

 

                                                            PRESCRIP TRANSMIT VI

A ERX SY                                

                          CPT-4:                    2010             

   

 

                                                            THER/PROPH/DIAG INJ 

SC/IM                                   

                          CPT-4: 03156                   2010             

   

 

                                              VITAMIN B12 INJECTION             

                        

CPT-4:                             2010                

 

                                                            THER/PROPH/DIAG INJ 

SC/IM                                   

                          CPT-4: 88188                   2010             

   

 

                                              VITAMIN B12 INJECTION             

                        

CPT-4:                             2010                

 

                                              ROUTINE VENIPUNCTURE              

                       

CPT-4: 86686                            2010                



                                                                                
                                                                                
                                                                                
                                                                                
                                                                                
                                                                                
                                                                                
                                                                                
                                                                                
                                                                                
                                                                                
                                                                                
                                                                                
                                                                                
                                                                                
                                                                                
                                                                                
                                                                                
                                                                                
                                                           



Vital Signs

                      



                          Date                      Vital                

 

                          2019                                       Blood

 Pressure 1: 140/70 Code: 

8480-6                                                         Heart Rate 1: 57 

bpm                                                         SpO2: 99%           

                                                            Temperature: 35.9 (C

) / 96.6 (F)   

                                                            Weight: 215 lbs     

       

                      

 

                          06/10/2019                                       Blood

 Pressure 1: 144/70 Code: 

8480-6                                                         Heart Rate 1: 60 

bpm                                                         Respiratory Rate: 20

bpm                                                         SpO2: 95%           

                                                            Temperature: 36.4 (C

) / 97.6 (F)   

                                                            Weight: 214 lbs     

       

                      

 

                          2019                                       Blood

 Pressure 1: 128/78 Code: 

8480-6                                                         Heart Rate 1: 56 

bpm                                                         Respiratory Rate: 20

bpm                                                         SpO2: 98%           

                                                            Temperature: 36.3 (C

) / 97.4 (F)   

                                                            Weight: 213 lbs     

       

                      

 

                          2018                                       Blood

 Pressure 1: 142/60 Code: 

8480-6                                                         BMI: 38.2 Code: 

49861-5                                                         Heart Rate 1: 48

bpm                                                         Height: 5'2"        

                                                            Respiratory Rate: 20

 bpm        

                                                            SpO2: 98%           

            

                                                            Temperature: 36.7 (C

) / 98.1 (F)               

                                                            Weight: 212 lbs     

                   

          

 

                          2018                                       Blood

 Pressure 1: 142/64 Code: 

8480-6                                                         BMI: 38.5 Code: 

79231-5                                                         Heart Rate 1: 52

bpm                                                         Height: 5'2"        

                                                            Respiratory Rate: 22

 bpm        

                                                            SpO2: 96%           

            

                                                            Temperature: 36.1 (C

) / 96.9 (F)               

                                                            Weight: 214 lbs     

                   

          

 

                          10/02/2018                                       Blood

 Pressure 1: 124/80 Code: 

8480-6                                                         BMI: 38.3 Code: 

65035-1                                                         Heart Rate 1: 68

bpm                                                         Height: 5'2"        

                                                            Respiratory Rate: 20

 bpm        

                                                            Temperature: 36.3 (C

) / 97.4 (F)

                                                            Weight: 213 lbs     

    

                         

 

                          2018                                       Blood

 Pressure 1: 132/78 Code: 

8480-6                                                         BMI: 37.6 Code: 

65010-0                                                         Heart Rate 1: 68

bpm                                                         Height: 5'2"        

                                                            Respiratory Rate: 20

 bpm        

                                                            SpO2: 97%           

            

                                                            Temperature: 36.8 (C

) / 98.2 (F)               

                                                            Weight: 209 lbs     

                   

          

 

                          2018                                       Blood

 Pressure 1: 150/76 Code: 

8480-6                                                         BMI: 38.5 Code: 

93658-6                                                         Heart Rate 1: 64

bpm                                                         Height: 5'2"        

                                                            Respiratory Rate: 20

 bpm        

                                                            SpO2: 97%           

            

                                                            Temperature: 36.2 (C

) / 97.2 (F)               

                                                            Weight: 214 lbs     

                   

          

 

                          2018                                       Blood

 Pressure 1: 122/74 Code: 

8480-6                                                         BMI: 38.2 Code: 

38976-5                                                         Heart Rate 1: 64

bpm                                                         Height: 5'2"        

                                                            Respiratory Rate: 18

 bpm        

                                                            SpO2: 96%           

            

                                                            Temperature: 35.8 (C

) / 96.4 (F)               

                                                            Weight: 212 lbs     

                   

          

 

                          2018                                       Blood

 Pressure 1: 124/78 Code: 

8480-6                                                         BMI: 37.8 Code: 

93691-8                                                         Heart Rate 1: 76

bpm                                                         Height: 5'2"        

                                                            Respiratory Rate: 20

 bpm        

                                                            Temperature: 36.8 (C

) / 98.3 (F)

                                                            Weight: 210 lbs     

    

                         

 

                          2018                                       Blood

 Pressure 1: 136/70 Code: 

8480-6                                                         BMI: 38.0 Code: 

78076-2                                                         Heart Rate 1: 68

bpm                                                         Height: 5'2"        

                                                            Respiratory Rate: 20

 bpm        

                                                            SpO2: 97%           

            

                                                            Temperature: 36.8 (C

) / 98.2 (F)               

                                                            Weight: 211 lbs     

                   

          

 

                          2018                                       Blood

 Pressure 1: 140/65 Code: 

8480-6                                                         Heart Rate 1: 75 

bpm                                                         Respiratory Rate: 24

bpm                                                         SpO2: 95%           

                                                            Temperature: 37.0 (C

) / 98.6 (F)   

                                                            Weight: 211 lbs     

       

                      

 

                          2018                                       Blood

 Pressure 1: 154/70 Code: 

8480-6                                                         BMI: 37.6 Code: 

84416-1                                                         Heart Rate 1: 76

bpm                                                         Height: 5'2"        

                                                            Respiratory Rate: 20

 bpm        

                                                            SpO2: 98%           

            

                                                            Temperature: 36.9 (C

) / 98.5 (F)               

                                                            Weight: 209 lbs     

                   

          

 

                          2017                                       Blood

 Pressure 1: 156/70 Code: 

8480-6                                                         BMI: 37.1 Code: 

11683-9                                                         Heart Rate 1: 72

bpm                                                         Height: 5'2"        

                                                            Respiratory Rate: 20

 bpm        

                                                            SpO2: 97%           

            

                                                            Temperature: 37.0 (C

) / 98.6 (F)               

                                                            Weight: 206 lbs     

                   

          

 

                          2017                                       Blood

 Pressure 1: 152/78 Code: 

8480-6                                                         BMI: 36.8 Code: 

59335-6                                                         Heart Rate 1: 78

bpm                                                         Height: 5'2"        

                                                            Respiratory Rate: 20

 bpm        

                                                            SpO2: 98%           

            

                                                            Temperature: 36.1 (C

) / 97.0 (F)               

                                                            Weight: 204 lbs     

                   

          

 

                          2017                                       Blood

 Pressure 1: 142/70 Code: 

8480-6                                                         BMI: 36.9 Code: 

56540-2                                                         Heart Rate 1: 64

bpm                                                         Height: 5'2"        

                                                            Respiratory Rate: 20

 bpm        

                                                            SpO2: 96%           

            

                                                            Temperature: 36.5 (C

) / 97.7 (F)               

                                                            Weight: 205 lbs     

                   

          

 

                          2017                                       Blood

 Pressure 1: 142/68 Code: 

8480-6                                                         Heart Rate 1: 88 

bpm                                                         Respiratory Rate: 18

bpm                                                         SpO2: 98%           

                                                            Temperature: 36.3 (C

) / 97.3 (F)   

                                                            Weight: 209 lbs     

       

                      

 

                          2016                                       Blood

 Pressure 1: 130/78 Code: 

8480-6                                                         Heart Rate 1: 68 

bpm                                                         Respiratory Rate: 20

bpm                                                         SpO2: 95%           

                                                            Temperature: 36.4 (C

) / 97.6 (F)   

                                                            Weight: 212 lbs     

       

                      

 

                          2016                                       Blood

 Pressure 1: 122/64 Code: 

8480-6                                                         BMI: 39.1 Code: 

46282-7                                                         Heart Rate 1: 76

bpm                                                         Height: 5'2"        

                                                            Respiratory Rate: 20

 bpm        

                                                            Temperature: 36.8 (C

) / 98.2 (F)

                                                            Weight: 217 lbs     

    

                         

 

                          2016                                       Blood

 Pressure 1: 144/70 Code: 

8480-6                                                         BMI: 39.4 Code: 

32902-0                                                         Heart Rate 1: 76

bpm                                                         Height: 5'2"        

                                                            Respiratory Rate: 20

 bpm        

                                                            Temperature: 36.6 (C

) / 97.9 (F)

                                                            Weight: 219 lbs     

    

                         

 

                          2015                                       Blood

 Pressure 1: 152/60 Code: 

8480-6                                                         BMI: 39.6 Code: 

35201-5                                                         Heart Rate 1: 84

bpm                                                         Height: 5'2"        

                                                            Respiratory Rate: 20

 bpm        

                                                            Temperature: 37.0 (C

) / 98.6 (F)

                                                            Weight: 220 lbs     

    

                         

 

                          2015                                       Blood

 Pressure 1: 146/76 Code: 

8480-6                                                         BMI: 39.8 Code: 

70071-2                                                         Heart Rate 1: 88

bpm                                                         Height: 5'2"        

                                                            Respiratory Rate: 20

 bpm        

                                                            Temperature: 37.0 (C

) / 98.6 (F)

                                                            Weight: 221 lbs     

    

                         

 

                          2015                                       Blood

 Pressure 1: 132/70 Code: 

8480-6                                                         BMI: 39.1 Code: 

91877-3                                                         Heart Rate 1: 88

bpm                                                         Height: 5'2"        

                                                            Respiratory Rate: 20

 bpm        

                                                            Temperature: 36.4 (C

) / 97.6 (F)

                                                            Weight: 217 lbs     

    

                         

 

                          2015                                       Blood

 Pressure 1: 132/66 Code: 

8480-6                                                         BMI: 39.9 Code: 

79734-6                                                         Heart Rate 1: 72

bpm                                                         Height: 5'2"        

                                                            Respiratory Rate: 20

 bpm        

                                                            Temperature: 36.9 (C

) / 98.4 (F)

                                                            Weight: 218 lbs     

    

                         

 

                          2015                                       Blood

 Pressure 1: 136/80 Code: 

8480-6                                                         Heart Rate 1: 76 

bpm                                                         Respiratory Rate: 20

bpm                                                         Temperature: 36.7 

(C) / 98.0 (F)                                                         Weight: 

224 lbs                                   

 

                          2015                                       Blood

 Pressure 1: 134/78 Code: 

8480-6                                                         BMI: 39.7 Code: 

12655-7                                                         Heart Rate 1: 84

bpm                                                         Height: 5'2"        

                                                            Respiratory Rate: 20

 bpm        

                                                            Temperature: 36.7 (C

) / 98.0 (F)

                                                            Weight: 217 lbs     

    

                         

 

                          2014                                       Blood

 Pressure 1: 146/78 Code: 

8480-6                                                         BMI: 39.5 Code: 

55347-4                                                         Heart Rate 1: 82

bpm                                                         Height: 5'2"        

                                                            Respiratory Rate: 18

 bpm        

                                                            Temperature: 35.6 (C

) / 96.1 (F)

                                                            Weight: 216 lbs     

    

                         

 

                          2014                                       Blood

 Pressure 1: 134/70 Code: 

8480-6                                                         BMI: 37.9 Code: 

48138-4                                                         Heart Rate 1: 80

bpm                                                         Height: 5'3"        

                                                            Respiratory Rate: 20

 bpm        

                                                            Temperature: 36.8 (C

) / 98.2 (F)

                                                            Weight: 214 lbs     

    

                         

 

                          2014                                       Blood

 Pressure 1: 132/70 Code: 

8480-6                                                         BMI: 37.6 Code: 

49328-5                                                         Heart Rate 1: 80

bpm                                                         Height: 5'3"        

                                                            Respiratory Rate: 20

 bpm        

                                                            Temperature: 36.8 (C

) / 98.3 (F)

                                                            Weight: 212 lbs     

    

                         

 

                          2014                                       Blood

 Pressure 1: 116/74 Code: 

8480-6                                                         Heart Rate 1: 68 

bpm                                                         Respiratory Rate: 20

bpm                                                         Temperature: 36.2 

(C) / 97.1 (F)                                                         Weight: 

212 lbs                                   

 

                          10/15/2013                                       Blood

 Pressure 1: 132/82 Code: 

8480-6                                                         BMI: 37.4 Code: 

34133-6                                                         Heart Rate 1: 76

bpm                                                         Height: 5'3"        

                                                            Respiratory Rate: 20

 bpm        

                                                            Temperature: 36.7 (C

) / 98.0 (F)

                                                            Weight: 211 lbs     

    

                         

 

                          2013                                       Blood

 Pressure 1: 130/76 Code: 

8480-6                                                         Heart Rate 1: 78 

bpm                                  

 

                          2013                                       Blood

 Pressure 1: 140/82 Code: 

8480-6                                                         BMI: 36.8 Code: 

97354-6                                                         Heart Rate 1: 66

bpm                                                         Height: 5'3"        

                                                            Respiratory Rate: 20

 bpm        

                                                            Temperature: 36.1 (C

) / 96.9 (F)

                                                            Weight: 208 lbs     

    

                         

 

                          2013                                       Blood

 Pressure 1: 138/80 Code: 

8480-6                                                         BMI: 36.4 Code: 

32594-4                                                         Heart Rate 1: 72

bpm                                                         Height: 5'4"        

                                                            Respiratory Rate: 20

 bpm        

                                                            Temperature: 36.7 (C

) / 98.0 (F)

                                                            Weight: 212 lbs     

    

                         

 

                          2013                                       Blood

 Pressure 1: 124/70 Code: 

8480-6                                                         BMI: 36.9 Code: 

77751-5                                                         Heart Rate 1: 60

bpm                                                         Height: 5'4"        

                                                            Temperature: 36.1 (C

) / 97.0 (F)

                                                            Weight: 215 lbs     

    

                         

 

                          05/10/2013                                       Blood

 Pressure 1: 132/86 Code: 

8480-6                                                         BMI: 36.9 Code: 

58380-5                                                         Heart Rate 1: 76

bpm                                                         Height: 5'4"        

                                                            Respiratory Rate: 20

 bpm        

                                                            Temperature: 36.8 (C

) / 98.2 (F)

                                                            Weight: 215 lbs     

    

                         

 

                          2013                                       Blood

 Pressure 1: 134/82 Code: 

8480-6                                                         BMI: 36.6 Code: 

70682-7                                                         Heart Rate 1: 72

bpm                                                         Height: 5'4"        

                                                            Respiratory Rate: 20

 bpm        

                                                            Temperature: 36.3 (C

) / 97.4 (F)

                                                            Weight: 213 lbs     

    

                         

 

                          2012                                       Blood

 Pressure 1: 142/80 Code: 

8480-6                                                         BMI: 37.1 Code: 

46478-5                                                         Heart Rate 1: 76

bpm                                                         Height: 5'4"        

                                                            Respiratory Rate: 20

 bpm        

                                                            Temperature: 36.8 (C

) / 98.3 (F)

                                                            Weight: 216 lbs     

    

                         

 

                          10/23/2012                                       Blood

 Pressure 1: 128/68 Code: 

8480-6                                                         BMI: 37.6 Code: 

41285-3                                                         Heart Rate 1: 72

bpm                                                         Height: 5'4"        

                                                            Respiratory Rate: 20

 bpm        

                                                            Temperature: 36.6 (C

) / 97.9 (F)

                                                            Weight: 219 lbs     

    

                         

 

                          10/10/2012                                       Blood

 Pressure 1: 122/70 Code: 

8480-6                                                         Heart Rate 1: 76 

bpm                                                         Respiratory Rate: 20

bpm                                                         Temperature: 36.7 

(C) / 98.1 (F)                                                         Weight: 

218 lbs                                   

 

                          2012                                       Blood

 Pressure 1: 132/80 Code: 

8480-6                                                         Heart Rate 1: 84 

bpm                                  

 

                          2012                                       Blood

 Pressure 1: 128/78 Code: 

8480-6                                                         BMI: 38.1 Code: 

89685-5                                                         Heart Rate 1: 84

bpm                                                         Height: 5'4"        

                                                            Respiratory Rate: 20

 bpm        

                                                            Temperature: 36.9 (C

) / 98.4 (F)

                                                            Weight: 222 lbs     

    

                         

 

                          2012                                       Blood

 Pressure 1: 138/80 Code: 

8480-6                                                         BMI: 37.9 Code: 

55636-2                                                         Heart Rate 1: 74

bpm                                                         Height: 5'4"        

                                                            Temperature: 36.1 (C

) / 97.0 (F)

                                                            Weight: 221 lbs     

    

                         

 

                          2012                                       Blood

 Pressure 1: 126/78 Code: 

8480-6                                                         BMI: 38.4 Code: 

76371-5                                                         Heart Rate 1: 72

bpm                                                         Height: 5'4"        

                                                            Respiratory Rate: 20

 bpm        

                                                            Temperature: 36.7 (C

) / 98.0 (F)

                                                            Weight: 224 lbs     

    

                         

 

                          2012                                       Blood

 Pressure 1: 138/72 Code: 

8480-6                                                         BMI: 38.4 Code: 

54228-9                                                         Heart Rate 1: 72

bpm                                                         Height: 5'4"        

                                                            Respiratory Rate: 20

 bpm        

                                                            Temperature: 36.6 (C

) / 97.9 (F)

                                                            Weight: 224 lbs     

    

                         

 

                          2012                                       Blood

 Pressure 1: 122/78 Code: 

8480-6                                                         BMI: 38.8 Code: 

08493-3                                                         Heart Rate 1: 88

bpm                                                         Height: 5'4"        

                                                            Respiratory Rate: 20

 bpm        

                                                            Temperature: 36.6 (C

) / 97.8 (F)

                                                            Weight: 226 lbs     

    

                         

 

                          2012                                       Blood

 Pressure 1: 116/60 Code: 

8480-6                                                         BMI: 38.1 Code: 

24198-8                                                         Heart Rate 1: 92

bpm                                                         Height: 5'4"        

                                                            Respiratory Rate: 20

 bpm        

                                                            Temperature: 36.8 (C

) / 98.2 (F)

                                                            Weight: 222 lbs     

    

                         

 

                          2011                                       Blood

 Pressure 1: 118/62 Code: 

8480-6                                                         BMI: 37.9 Code: 

50067-7                                                         Heart Rate 1: 80

bpm                                                         Height: 5'4"        

                                                            Temperature: 36.5 (C

) / 97.7 (F)

                                                            Weight: 221 lbs     

    

                         

 

                          2011                                       Blood

 Pressure 1: 132/70 Code: 

8480-6                                                         Heart Rate 1: 84 

bpm                                                         Respiratory Rate: 20

bpm                                                         Temperature: 36.7 

(C) / 98.0 (F)                                                         Weight: 

221 lbs                                   

 

                          2011                                       Blood

 Pressure 1: 114/72 Code: 

8480-6                                                         BMI: 37.6 Code: 

88062-6                                                         Heart Rate 1: 76

bpm                                                         Height: 5'4"        

                                                            Respiratory Rate: 20

 bpm        

                                                            Temperature: 36.8 (C

) / 98.3 (F)

                                                            Weight: 219 lbs     

    

                         

 

                          2011                                       Blood

 Pressure 1: 136/76 Code: 

8480-6                                                         Temperature: 36.0

(C) / 96.8 (F)                                                         Weight: 

219 lbs 8 oz                                  

 

                          2011                                       Blood

 Pressure 1: 128/80 Code: 

8480-6                                                         Heart Rate 1: 72 

bpm                                                         Temperature: 36.3 

(C) / 97.4 (F)                                                         Weight: 

218 lbs                                   

 

                          2011                                       Blood

 Pressure 1: 126/70 Code: 

8480-6                                                         Heart Rate 1: 72 

bpm                                                         Temperature: 36.7 

(C) / 98.0 (F)                                  

 

                          2010                                       Blood

 Pressure 1: 126/78 Code: 

8480-6                                                         Temperature: 36.2

(C) / 97.1 (F)                                                         Weight: 

217 lbs 8 oz                                  

 

                          2010                                       Blood

 Pressure 1: 116/70 Code: 

8480-6                                                         Heart Rate 1: 72 

bpm                                                         Temperature: 36.4 

(C) / 97.6 (F)                                                         Weight: 

222 lbs                                   

 

                          2010                                       Blood

 Pressure 1: 114/78 Code: 

8480-6                                                         Heart Rate 1: 72 

bpm                                                         Temperature: 37.1 

(C) / 98.8 (F)                                                         Weight: 

225 lbs                                   

 

                          2010                                       Blood

 Pressure 1: 128/78 Code: 

8480-6                                                         Heart Rate 1: 76 

bpm                                                         Temperature: 36.6 

(C) / 97.8 (F)                                                         Weight: 

224 lbs                                   

 

                          2010                                       Blood

 Pressure 1: 116/78 Code: 

8480-6                                                         Heart Rate 1: 80 

bpm                                                         Temperature: 36.4 

(C) / 97.6 (F)                                                         Weight: 

226 lbs                                   

 

                          2010                                       Blood

 Pressure 1: 120/70 Code: 

8480-6                                                         BMI: 38.3 Code: 

47510-2                                                         Heart Rate 1: 88

bpm                                                         Height: 5'5"        

                                                            Temperature: 36.4 (C

) / 97.6 (F)

                                                            Weight: 230 lbs     

    

                         



                                                                                
                                                                                
                                                                                
                                                                                
                                                                                
                                                                                
                                                                                
                                                                                
                    



Functional Status

          No Functional Status data                                             
            



History of Present Illness

                      



                    Symptom Name                   Status                   Resu

lt                  

                          Effective Date                   Notes                

 

                                   Quality                   chronic            

       

06/10/2019                              None                

 

                                   Quality                   stable             

      06/10/2019

                                        None                

 

                                   Quality                   chronic            

       

06/10/2019                              None                

 

                                   Quality                   hesitancy          

         

2019                              None                

 

                                   Quality                   stable             

      2019

                                        None                

 

                                   Quality                   acute              

     2019 

                                        None                

 

                                   Quality                   improving          

         

2019                              back on omeprazole--finished all of H py

saran regimen

but had pain in swelling in feet and hands with protonix                

 

                    gastroesophageal reflux                   Quality           

        

regurgitation of acid                   2018                   None       

        

 

                    gastroesophageal reflux                   Quality           

        chronic     

                          2018                   None                

 

                    chest pain/pressure                   Location              

     in the 

substernal area                   2018                   None             

  

 

                    chest pain/pressure                   Quality               

    stable          

                          2018                   None                

 

                          gastroesophageal reflux                   Onset and Re

solution                  

                    ongoing                   2018                   None 

               

 

                anxiety                   Quality                   acute       

            

2018                              None                

 

                anxiety                   Quality                   agitation   

                

2018                              None                

 

                    anxiety                   Onset and Resolution              

     ongoing        

                          2018                   None                

 

                arrhythmia                   Quality                   acute    

               

2018                              None                

 

                    arrhythmia                   Quality                   inter

mittent             

                          2018                   None                

 

                    arrhythmia                   Onset and Resolution           

        ongoing     

                          2018                   None                

 

                    gastroesophageal reflux                   Quality           

        acute       

                          2018                   None                

 

                    gastroesophageal reflux                   Quality           

        

regurgitation of acid                   2018                   None       

        

 

                          gastroesophageal reflux                   Onset and Re

solution                  

                    ongoing                   2018                   None 

               

 

                    gastroesophageal reflux                   Onset of Symptom  

                 

during adulthood                   2018                   None            

   

 

                    hypertension                   Onset and Resolution         

          ongoing   

                          2018                   None                

 

                    abdominal pain                   Location                   

in the epigastric 

area                      10/02/2018                   None                

 

                    abdominal pain                   Quality                   i

mproving            

                          10/02/2018                   None                

 

                    arrhythmia                   Quality                   irreg

ular beats          

                          10/02/2018                   told holter showed episod

e of atrial 

fibrillation so has to get a stress test done                

 

                    stress                   Exacerbating Factors               

    stressful life 

changes                   10/02/2018                   None                

 

                    anxiety                   Onset and Resolution              

     ongoing        

                          10/02/2018                   None                

 

                fatigue                   Quality                   chronic     

              

10/02/2018                              None                

 

                fatigue                   Quality                   constant    

               

10/02/2018                              None                

 

                    fatigue                   Onset and Resolution              

     ongoing        

                          10/02/2018                   None                

 

                    gastroesophageal reflux                   Quality           

        chronic     

                          2018                   None                

 

                          gastroesophageal reflux                   Onset and Re

solution                  

                    ongoing                   2018                   None 

               

 

                    abdominal pain                   Location                   

in the periumbilical

area                      2018                   None                

 

                    abdominal pain                   Location                   

in the epigastric 

area                      2018                   None                

 

                    abdominal pain                   Quality                   b

urning.             

                          2018                   Patient was in ER on  and was started 

on carafate 1gm QID                

 

                    muscle weakness                   Location                  

 diffusely          

                          2018                   None                

 

                    muscle weakness                   Quality                   

clumsiness          

                          2018                   None                

 

                    muscle weakness                   Quality                   

worsening           

                          2018                   None                

 

                    muscle weakness                   Quality                   

fatigue             

                          2018                   None                

 

                    hypertension                   Quality                   wor

sening              

                          2018                   None                

 

                    headache                   Location                   diffus

ely                 

                          2018                   None                

 

                    dizziness                   Quality                   lighth

eadedness           

                          2018                   None                

 

                    dizziness                   Quality                   [facility]

nce                 

                          2018                   None                

 

                    dizziness                   Onset and Resolution            

       ongoing      

                          2018                   None                

 

                    dizziness                   Onset of Symptom                

   1 1/2 months ago 

                          2018                   None                

 

                    muscle weakness                   Onset and Resolution      

             ongoing

                          2018                   None                

 

                    muscle weakness                   Onset of Symptom          

         1 1/2 

months ago                   2018                   None                

 

                    hypoglycemia                   Quality                   int

ermittent           

                          2018                   None                

 

                    neck pain                   Location                   in th

e posterior area    

                          2018                   None                

 

                    neck pain                   Quality                   improv

ing.                

                          2018                   Patient is still going to

 PT twice weekly and 

home exercises                

 

                    neck pain                   Location                   on th

e right             

                          2018                   None                

 

                    dyspnea                   Quality                   [facility]

s of breath         

                          2018                   None                

 

                    dyspnea                   Quality                   breathle

ssness              

                          2018                   None                

 

                    back pain                   Location                   in th

e right upper back 

area                      2018                   None                

 

                    back pain                   Onset and Resolution            

       ongoing      

                          2018                   None                

 

                back pain                   Quality                   dull      

             

2018                              None                

 

                    back pain                   Quality                   consta

nt                  

                          2018                   None                

 

                    back pain                   Quality                   discom

fort                

                          2018                   None                

 

                    muscle weakness                   Location                  

 diffusely          

                          2018                   None                

 

                    muscle weakness                   Quality                   

lower extremities   

                          2018                   None                

 

                    muscle weakness                   Quality                   

fatigue             

                          2018                   None                

 

                    muscle weakness                   Quality                   

clumsiness          

                          2018                   None                

 

                    muscle weakness                   Quality                   

worsening           

                          2018                   None                

 

                    otalgia                   Location                   on both

 sides              

                          2018                   None                

 

                otalgia                   Quality                   acute       

            

2018                              None                

 

                    otalgia                   Onset and Resolution              

     sudden in onset

                          2018                   None                

 

                    back pain                   Location                   in th

e left lower back 

area                      2018                   None                

 

                    back pain                   Quality                   discom

fort                

                          2018                   None                

 

                    back pain                   Radiating                   the 

inguinal area       

                          2018                   None                

 

                    back pain                   Radiating                   into

 left hip           

                          2018                   None                

 

                    back pain                   Onset of Symptom                

   1 weeks ago      

                          2018                   None                

 

                back pain                   Quality                   sharp     

              

2018                              None                

 

                    back pain                   Quality                   consta

nt                  

                          2018                   None                

 

                back pain                   Quality                   dull      

             

2017                              None                

 

                    back pain                   Quality                   improv

ing.                

                          2017                   Patient has finished PT a

nd continues to do home 

exercises                

 

                back pain                   Quality                   stable    

               

2017                              None                

 

                    back pain                   Location                   lumba

r spine             

                          2017                   None                

 

                    gait abnormality                   Quality                  

 unsteady           

                          2017                   None                

 

                    gait abnormality                   Quality                  

 improving          

                          2017                   None                

 

                    pelvic pain                   Location                   on 

the left            

                          2017                   None                

 

                pelvic pain                   Quality                   acute   

                

2017                              None                

 

                    pelvic pain                   Quality                   achi

ng                  

                          2017                   None                

 

                    pelvic pain                   Quality                   coli

cky                 

                          2017                   None                

 

                    pelvic pain                   Quality                   heav

iness               

                          2017                   None                

 

                    pelvic pain                   Onset and Resolution          

         gradual in 

onset                     2017                   None                

 

                    pelvic pain                   Onset of Symptom              

     2 months ago   

                          2017                   None                

 

                    hyperglycemia                   Quality                   gl

ucose intolerance   

                          2017                   None                

 

                    hyperglycemia                   Quality                   wo

rsening.            

                          2017                   Blood sugars have increas

ed into 110-120's   

            

 

                    hyperglycemia                   Onset of Symptom            

       2-3 months 

ago                       2017                   None                

 

                    breast complaint                   Location                 

  in the left       

                          2017                   None                

 

                    breast complaint                   Location                 

  in the right      

                          2017                   None                

 

                    breast complaint                   Quality                  

 tenderness         

                          2017                   None                

 

                    cough                   Location                   in the th

roat                

                          2017                   None                

 

                cough                   Quality                   acute         

          

2017                              None                

 

                cough                   Quality                   dry           

        

2017                              None                

 

                cough                   Quality                   hacking       

            

2017                              None                

 

                cough                   Quality                   tight         

          

2017                              None                

 

                    cough                   Onset of Symptom                   1

-2 days ago         

                          2017                   None                

 

                    otalgia                   Location                   on both

 sides              

                          2017                   None                

 

                otalgia                   Quality                   acute       

            

2017                              None                

 

                otalgia                   Quality                   throbbing   

                

2017                              None                

 

                    otalgia                   Onset and Resolution              

     sudden in onset

                          2017                   None                

 

                    otalgia                   Onset of Symptom                  

 3-5 days ago       

                          2017                   None                

 

                    chest tightness                   Location                  

 diffusely          

                          2017                   None                

 

                    chest tightness                   Quality                   

acute               

                          2017                   None                

 

                    chest tightness                   Quality                   

dull                

                          2017                   None                

 

                    chest tightness                   Quality                   

pleuritic           

                          2017                   None                

 

                    chest tightness                   Quality                   

piercing            

                          2017                   None                

 

                    chest tightness                   Onset and Resolution      

             sudden 

in onset                   2017                   None                

 

                    chest tightness                   Onset of Symptom          

         3-5 days 

ago                       2017                   None                

 

                cough                   Quality                   productive    

               

2017                              None                

 

                    nasal discharge                   Location                  

 in both  nares     

                          2017                   None                

 

                    nasal discharge                   Quality                   

acute               

                          2017                   None                

 

                    nasal discharge                   Quality                   

green               

                          2017                   None                

 

                    nasal discharge                   Quality                   

thick               

                          2017                   None                

 

                    nasal discharge                   Quality                   

worsening           

                          2017                   None                

 

                    nasal discharge                   Onset of Symptom          

         _ days ago 

                          2017                   None                

 

                    sinus congestion                   Quality                  

 acute              

                          2017                   None                

 

                    sinus congestion                   Quality                  

 fullness           

                          2017                   None                

 

                    sinus congestion                   Quality                  

 pain               

                          2017                   None                

 

                    sinus congestion                   Quality                  

 pressure           

                          2017                   None                

 

                    sinus congestion                   Onset of Symptom         

          _ days ago

                          2017                   None                

 

                    otalgia                   Location                   on the 

right               

                          2016                   None                

 

                otalgia                   Quality                   throbbing   

                

2016                              None                

 

                    otalgia                   Onset and Resolution              

     gradual in 

onset                     2016                   None                

 

                    otalgia                   Onset of Symptom                  

 1 weeks ago        

                          2016                   None                

 

                    otalgia                   Onset and Resolution              

     ongoing        

                          2016                   Has been using loratadine

 and flonase    

           

 

                otalgia                   Quality                   acute       

            

2016                              None                

 

                    otalgia                   Onset and Resolution              

     sudden in onset

                          2016                   None                

 

                    constipation                   Quality                   doc

ry 2-3 days         

                          2016                   None                

 

                    constipation                   Quality                   sma

ll stools           

                          2016                   None                

 

                constipation                   Quality                   hard   

                

2016                              None                

 

                    constipation                   Onset and Resolution         

          ongoing   

                          2016                   None                

 

                    constipation                   Alleviating Factors          

         medication.

                          2016                   Has tried various OTC chase

atments 

with very little releif                

 

                    constipation                   Alleviating Factors          

         diet 

changes                   2016                   None                

 

                    flank pain                   Location                   on b

oth sides           

                          2016                   None                

 

                    flank pain                   Quality                   stabb

ing                 

                          2016                   None                

 

                flank pain                   Quality                   sharp    

               

2016                              None                

 

                    flank pain                   Onset and Resolution           

        resolved    

                          2016                   Only had the one episode 

1 week ago  

             

 

                    back pain                   Location                   in th

e low back          

                          2016                   None                

 

                back pain                   Quality                   dull      

             

2016                              None                

 

                back pain                   Quality                   chronic   

                

2016                              None                

 

                    back pain                   Onset and Resolution            

       worse during 

the morning                   2016                   None                

 

                    hyperglycemia                   Quality                   gl

ucose intolerance   

                          2015                   None                

 

                    hyperglycemia                   Quality                   st

able                

                          2015                   None                

 

                    otalgia                   Location                   on both

 sides              

                          2015                   None                

 

                otalgia                   Quality                   dull        

           

2015                              None                

 

                    dizziness                   Quality                   interm

ittent              

                          2015                   None                

 

                    dizziness                   Quality                   lighth

eadedness           

                          2015                   None                

 

                    dizziness                   Quality                   imbala

nce                 

                          2015                   None                

 

                    hypertension                   Quality                   chr

onic                

                          2015                   None                

 

                    hypertension                   Quality                   sta

ble                 

                          2015                   None                

 

                otalgia                   Quality                   acute       

            

2015                              None                

 

                    otalgia                   Onset and Resolution              

     sudden in onset

                          2015                   None                

 

                    otalgia                   Onset and Resolution              

     ongoing        

                          2015                   None                

 

                    hypertension                   Onset and Resolution         

          ongoing   

                          2015                   None                

 

                    hypertension                   Onset of Symptom             

      during 

adulthood                   2015                   None                

 

                    hypertension                   Exacerbating Factors         

          stress    

                          2015                   None                

 

                    hypertension                   Alleviating Factors          

         medication 

                          2015                   None                

 

                    hyperglycemia                   Onset of Symptom            

       during 

adulthood                   2015                   None                

 

                otalgia                   Severity                   moderate   

                

2015                              None                

 

                    otalgia                   Triggers                   positio

n change            

                          2015                   None                

 

                    dizziness                   Onset and Resolution            

       ongoing      

                          2015                   None                

 

                    dizziness                   Quality                   rotati

on                  

                          2015                   None                

 

                    dizziness                   Quality                   loss o

f balance           

                          2015                   None                

 

                    dizziness                   Quality                   sense 

of room spinning    

                          2015                   None                

 

                dizziness                   Quality                   vertigo   

                

2015                              None                

 

                    dizziness                   Quality                   worsen

ing                 

                          2015                   None                

 

                    nasal allergies                   Location                  

 in both  nares     

                          2015                   None                

 

                    ataxia                   Quality                   feeling o

f unsteadiness with 

walking                   2015                   None                

 

                    ataxia                   Onset and Resolution               

    ongoing         

                          2015                   None                

 

                ataxia                   Quality                   worsening    

               

2015                              None                

 

                    ataxia                   Onset of Symptom                   

during adulthood    

                          2015                   None                

 

                    ataxia                   Limitation on Activities           

        necessitates

ambulation with a cane or walker                   2015                   

to make sure doesn't fall                

 

                    ataxia                   Frequency of Episodes              

     increasing     

                          2015                   None                

 

                ataxia                   Triggers                   activity    

               

2015                              has became more sedentary due to fear of

 falling   

            

 

                    muscle weakness                   Location                  

 diffusely          

                          2015                   None                

 

                    muscle weakness                   Quality                   

lower extremities   

                          2015                   None                

 

                    muscle weakness                   Quality                   

fatigue             

                          2015                   None                

 

                    muscle weakness                   Quality                   

worsening           

                          2015                   None                

 

                    muscle weakness                   Quality                   

clumsiness          

                          2015                   None                

 

                    muscle weakness                   Onset and Resolution      

             ongoing

                          2015                   None                

 

                fatigue                   Quality                   worsening   

                

2015                               passed away in July             

   

 

                    fatigue                   Quality                   low ener

gy                  

                          2015                   None                

 

                    muscle weakness                   Location                  

 diffusely          

                          2015                   None                

 

                    muscle weakness                   Quality                   

intermittent        

                          2015                   None                

 

                    muscle weakness                   Quality                   

fatigue             

                          2015                   None                

 

                    muscle weakness                   Onset and Resolution      

             ongoing

                          2015                   None                

 

                    otalgia                   Location                   on the 

right               

                          2015                   None                

 

                otalgia                   Quality                   acute       

            

2015                              None                

 

                otalgia                   Quality                   pressure    

               

2015                              None                

 

                    otalgia                   Quality                   uncomfor

table               

                          2015                   None                

 

                    otalgia                   Onset of Symptom                  

 2 weeks ago        

                          2015                   None                

 

                dizziness                   Quality                   acute     

              

2015                              None                

 

                    dizziness                   Quality                   feelin

gs of unsteadiness  

                          2015                   None                

 

                    dizziness                   Onset of Symptom                

   2 weeks ago      

                          2015                   None                

 

                    pain, limb                   Location                   on t

he right lower leg  

                          2015                   None                

 

                    pain, limb                   Quality                   inter

mittent             

                          2015                   None                

 

                    pain, limb                   Onset and Resolution           

        ongoing     

                          2015                   None                

 

                pain, limb                   Quality                   dull     

              

2015                              None                

 

                    pain, limb                   Onset of Symptom               

    2 weeks ago     

                          2015                   None                

 

                    pain, limb                   Quality                   worse

rhoda                

                          2015                   None                

 

                pain, limb                   Quality                   acute    

               

2015                              None                

 

                    pain, limb                   Pertinent Findings             

      Denies 

swelling                   2015                   None                

 

                    pain, limb                   Pertinent Findings             

      Denies warmth 

                          2015                   None                

 

                    pain, limb                   Pertinent Findings             

      muscle 

tenderness                   2015                   None                

 

                    pain, limb                   Pertinent Findings             

      Denies focal 

weakness                   2015                   None                

 

                    pain, limb                   Pertinent Findings             

      Denies 

erythema                   2015                   None                

 

                    chest pain/pressure                   Location              

     in the 

substernal area                   2015                   None             

  

 

                    chest pain/pressure                   Radiating             

      the back      

                          2015                   None                

 

                    chest pain/pressure                   Quality               

    improving       

                          2015                   None                

 

                    back pain                   Location                   [facility]

[facility]           

                          2015                   None                

 

                    back pain                   Quality                   improv

ing                 

                          2015                   None                

 

                    hyperglycemia                   Quality                   st

able                

                          2015                   Monitoring BS daily and r

anging 's         

      

 

                    hyperglycemia                   Quality                   pr

e-diabetic          

                          2015                   None                

 

                    hypertension                   Quality                   sta

ble                 

                          2015                   None                

 

                    hypertension                   Quality                   chr

onic                

                          2015                   None                

 

                    hyperlipidemia                   Quality                   s

table               

                          2015                   None                

 

                    sinusitis                   Location                   diffu

sely                

                          2014                   None                

 

                sinusitis                   Quality                   acute     

              

2014                              None                

 

                    sinusitis                   Quality                   fullne

ss                  

                          2014                   None                

 

                    sinusitis                   Onset of Symptom                

   2 days ago       

                          2014                   None                

 

                    cough                   Location                   in the th

roat                

                          2014                   None                

 

                cough                   Quality                   acute         

          

2014                              None                

 

                cough                   Quality                   dry           

        

2014                              None                

 

                cough                   Quality                   hacking       

            

2014                              None                

 

                    cough                   Onset of Symptom                   2

 weeks ago          

                          2014                   None                

 

                    otalgia                   Location                   on the 

right               

                          2014                   None                

 

                otalgia                   Quality                   acute       

            

2014                              None                

 

                otalgia                   Quality                   throbbing   

                

2014                              None                

 

                    otalgia                   Onset of Symptom                  

 1 week ago         

                          2014                   None                

 

                otalgia                   Quality                   pressure    

               

2014                              None                

 

                dizziness                   Quality                   acute     

              

2014                              None                

 

                    dizziness                   Quality                   feelin

gs of unsteadiness  

                          2014                   None                

 

                    dizziness                   Quality                   loss o

f balance           

                          2014                   None                

 

                    dizziness                   Onset of Symptom                

   1 week ago       

                          2014                   None                

 

                    muscle weakness                   Quality                   

lower extremities   

                          2014                   None                

 

                    muscle weakness                   Onset and Resolution      

             ongoing

                          2014                   Patient feels like it is 

related 

to amlodipine 2.5mg daily                

 

                    dizziness                   Quality                   sense 

of motion           

                          2014                   None                

 

                    dizziness                   Quality                   interm

ittent              

                          2014                   Inner ear issues---using 

flonase BID           

    

 

                cough                   Quality                   productive    

               

2014                              None                

 

                    back pain                   Location                   [facility]

[facility]           

                          2014                   None                

 

                back pain                   Quality                   aching    

               

2014                              None                

 

                sinusitis                   Quality                   pain      

             

2014                              None                

 

                    sinusitis                   Quality                   purule

nt                  

                          2014                   None                

 

                    sinusitis                   Quality                   fullne

ss                  

                          2014                   None                

 

                sinusitis                   Quality                   thick     

              

2014                              None                

 

                    sinusitis                   Quality                   pressu

re                  

                          2014                   None                

 

                    sinusitis                   Quality                   draina

ge                  

                          2014                   None                

 

                    sinusitis                   Onset and Resolution            

       ongoing      

                          2014                   Has been taking fluticaso

ne/claritin 

and also took leftover cefuroxime                

 

                    sinusitis                   Onset of Symptom                

   2 weeks ago      

                          2014                   None                

 

                    muscle weakness                   Quality                   

lower extremities   

                          2014                   None                

 

                    hypertension                   Onset and Resolution         

          ongoing   

                          2014                   None                

 

                    hypertension                   Onset of Symptom             

      during 

adulthood                   2014                   None                

 

                    hypertension                   Quality                   imp

roving              

                          2014                   None                

 

                    muscle weakness                   Location                  

 diffusely          

                          2014                   None                

 

                    muscle weakness                   Onset and Resolution      

             ongoing

                          2014                   None                

 

                    headache                   Location                   diffus

ely                 

                          2014                   None                

 

                headache                   Quality                   aching     

              

2014                              None                

 

                    headache                   Onset of Symptom                 

  3 days ago        

                          2014                   None                

 

                    cough                   Location                   in the th

roat                

                          2014                   None                

 

                cough                   Quality                   acute         

          

2014                              None                

 

                cough                   Quality                   productive    

               

2014                              Yellow sputum                

 

                    chest congestion                   Quality                  

 acute              

                          2014                   None                

 

                    chest congestion                   Quality                  

 painful            

                          2014                   None                

 

                    chest congestion                   Onset of Symptom         

          2 days ago

                          2014                   None                

 

                    sore throat                   Location                   dif

fusely              

                          2014                   None                

 

                    sore throat                   Quality                   achi

ng                  

                          2014                   None                

 

                    sore throat                   Onset of Symptom              

     2 days ago     

                          2014                   None                

 

                    cough                   Location                   in the th

roat                

                          10/15/2013                   None                

 

                cough                   Quality                   productive    

               

10/15/2013                              None                

 

                    chest congestion                   Quality                  

 thick/yellow 

secretions                   10/15/2013                   None                

 

                    chest congestion                   Onset of Symptom         

          2 days ago

                          10/15/2013                   Has been taking flonase a

nd 

claritin 1/2 tab daily                

 

                    myalgias                   Location                   diffus

ely                 

                          10/15/2013                   None                

 

                    hypertension                   Onset of Symptom             

      3 days ago    

                          2013                   None                

 

                    hypertension                   Quality                   acu

te                  

                          2013                   None                

 

                    hypertension                   Blood Pressure Values        

           Stage 

2:-179 mmHg / -109 mmHg                   2013              

                                        None                

 

                    headache                   Location                   in the

 occipital area     

                          2013                   None                

 

                headache                   Quality                   aching     

              

2013                              None                

 

                    headache                   Onset of Symptom                 

  3 days ago        

                          2013                   None                

 

                    lower leg pain                   Location                   

on the right        

                          2013                   None                

 

                    headache                   Onset and Resolution             

      resolved      

                          2013                   after got shot in ER     

           

 

                anxiety                   Quality                   chronic     

              

2013                              None                

 

                    anxiety                   Onset and Resolution              

     ongoing        

                          2013                   None                

 

                    dizziness                   Quality                   sense 

of room spinning    

                          2013                   None                

 

                    otalgia                   Location                   on the 

right               

                          2013                   None                

 

                    sinus pain                   Quality                   press

ure                 

                          2013                   None                

 

                    dizziness                   Onset of Symptom                

   1 weeks ago      

                          2013                   None                

 

                    headache                   Location                   diffus

ely                 

                          2013                   None                

 

                headache                   Quality                   acute      

             

2013                              None                

 

                    sinus pain                   Location                   diff

usely               

                          2013                   None                

 

                sinus pain                   Quality                   acute    

               

2013                              None                

 

                    follow up                   Illness                   sympto

ms improved         

                          2013                   None                

 

                    otalgia                   Location                   on both

 sides              

                          2013                   None                

 

                otalgia                   Quality                   stable      

             

2013                              None                

 

                    otalgia                   Onset and Resolution              

     resolved       

                          2013                   wants rechecked to see if

 does have hole

in eardrum                

 

                    otalgia                   Onset of Symptom                  

 4 days ago         

                          05/10/2013                   None                

 

                    sore throat                   Location                   on 

the right           

                          05/10/2013                   None                

 

                cough                   Quality                   dry           

        

05/10/2013                              None                

 

                    otalgia                   Location                   on the 

right               

                          05/10/2013                   hurts into throat, has tr

ied ear drops, pain 7-8

on 10 scale                

 

                    breast complaint                   Location                 

  in the left upper 

inner quadrant                   2013                   None              

 

 

                    breast complaint                   Quality                  

 pain               

                          2013                   None                

 

                    breast complaint                   Onset of Symptom         

          1 days ago

                          2013                   Hasn't had mammogram in m

any 

years                

 

                    abdominal pain                   Location                   

in the epigastric 

area                      2012                   None                

 

                    dyspepsia                   Quality                   heartb

urn                 

                          2012                   None                

 

                    dyspepsia                   Quality                   improv

ing                 

                          2012                   None                

 

                    dizziness                   Quality                   sense 

of room spinning    

                          2012                   None                

 

                    dizziness                   Onset of Symptom                

   4 days ago       

                          2012                   None                

 

                    otalgia                   Location                   on both

 sides              

                          2012                   None                

 

                    knee pain                   Location                   [facility] [place]

th knees            

                          2012                   None                

 

                    abdominal pain                   Quality                   i

mproving            

                          2012                   None                

 

                    abdominal pain                   Quality                   i

mproving            

                          10/23/2012                   None                

 

                    gas and bloating                   Quality                  

 improving          

                          10/23/2012                   None                

 

                    hypertension                   Quality                   sta

ble                 

                          10/23/2012                   None                

 

                    abdominal pain                   Location                   

in the epigastric 

area                      10/10/2012                   None                

 

                    abdominal pain                   Onset and Resolution       

            ongoing 

                          10/10/2012                   Has been taking omeprazol

e 20mg 

BID with no relief                

 

                    gas and bloating                   Location                 

  diffusely         

                          10/10/2012                   None                

 

                    gas and bloating                   Quality                  

 intermittent       

                          10/10/2012                   None                

 

                    diarrhea                   Quality                   intermi

ttent               

                          10/10/2012                   None                

 

                    nausea                   Quality                   intermitt

ent                 

                          10/10/2012                   None                

 

                    hypertension                   Quality                   sta

ble                 

                          10/10/2012                   None                

 

                    dizziness                   Quality                   lighth

eadedness/faint     

                          2012                   None                

 

                    dizziness                   Quality                   shakin

ess                 

                          2012                   None                

 

                dizziness                   Quality                   acute     

              

2012                              has had these episodes off and on past f

ew months  

             

 

                    dizziness                   Quality                   consta

nt                  

                          2012                   None                

 

                    dizziness                   Quality                   lighth

eadedness           

                          2012                   None                

 

                    dizziness                   Quality                   feelin

gs of unsteadiness  

                          2012                   None                

 

                    dizziness                   Onset and Resolution            

       sudden in 

onset                     2012                   None                

 

                    dizziness                   Onset of Symptom                

   1 days ago       

                          2012                   None                

 

                    dysuria                   Quality                   intermit

tent                

                          2012                   None                

 

                dysuria                   Quality                   aching      

             

2012                              None                

 

                    dysuria                   Onset and Resolution              

     sudden in onset

                          2012                   None                

 

                    dysuria                   Onset of Symptom                  

 2 days ago         

                          2012                   None                

 

                    muscle weakness                   Location                  

 diffusely          

                          2012                   None                

 

                    dizziness                   Quality                   lighth

eadedness/shakiness 

                          2012                   None                

 

                headache                   Quality                   dull       

            

2012                              None                

 

                    headache                   Location                   on the

 back side of head  

                          2012                   None                

 

                    headache                   Frequency of Episodes            

       daily        

                          2012                   None                

 

                nausea                   Quality                   acute        

           

2012                              had episode Friday while was with david proctor and he 

was having bloodwork and IV fluids done                

 

                dizziness                   Quality                   acute     

              

2012                              episode recently when was with boyfriend

 at 

hospital                

 

                    leg pain/sciatica                   Location                

   right leg 

sciatica                   2012                   None                

 

                    leg pain/sciatica                   Quality                 

  sharp pain        

                          2012                   None                

 

                    leg pain/sciatica                   Quality                 

  constant          

                          2012                   None                

 

                    leg pain/sciatica                   Onset and Resolution    

               

ongoing                   2012                   None                

 

                    leg pain/sciatica                   Onset of Symptom        

           2 weeks 

ago                       2012                   None                

 

                    leg pain/sciatica                   Limitation on Activities

                   

restricts weight bearing activity                   2012                  

                                        None                

 

                    leg pain/sciatica                   Limitation on Activities

                   

moderately limits activities                   2012                   None

               

 

                    hip pain                   Location                   on the

 right              

                          2012                   None                

 

                    hip pain                   Quality                   sharp p

ain                 

                          2012                   None                

 

                    hip pain                   Quality                   radiati

ng down leg         

                          2012                   None                

 

                    hip pain                   Onset and Resolution             

      sudden in 

onset                     2012                   None                

 

                    hip pain                   Onset of Symptom                 

  3 days ago        

                          2012                   None                

 

                    back pain                   Location                   in th

e upper back area   

                          2012                   None                

 

                back pain                   Quality                   sharp     

              

2012                              None                

 

                    back pain                   Quality                   pinchi

ng                  

                          2012                   None                

 

                    back pain                   Onset of Symptom                

   1 weeks ago      

                          2012                   None                

 

                    back pain                   Location                   lumba

r-sacral spine      

                          2012                   None                

 

                back pain                   Quality                   aching    

               

2012                              None                

 

                back pain                   Quality                   sharp     

              

2012                              None                

 

                    back pain                   Quality                   radiat

ing down right leg  

                          2012                   None                

 

                    back pain                   Onset of Symptom                

   1 weeks ago      

                          2012                   None                

 

                    pain, limb                   Location                   on t

he right leg        

                          2012                   None                

 

                    muscle weakness                   Quality                   

fatigue             

                          2012                   None                

 

                    muscle weakness                   Quality                   

shakey              

                          2012                   None                

 

                    muscle weakness                   Onset of Symptom          

         2 weeks ago

                          2012                   None                

 

                    arthralgia(s)                   Quality                   cr

amping              

                          2012                   None                

 

                    fatigue                   Onset and Resolution              

     ongoing        

                          2012                   None                

 

                fatigue                   Quality                   worsening   

                

2012                              None                

 

                    fatigue                   Quality                   intermit

tent                

                          2012                   None                

 

                    low blood pressure                   Onset and Resolution   

                

ongoing                   2012                   None                

 

                    arthralgia(s)                   Quality                   in

termittent          

                          2012                   None                

 

                    otalgia                   Location                   on both

 sides              

                          2011                   None                

 

                otalgia                   Quality                   constant    

               

2011                              None                

 

                otalgia                   Quality                   throbbing   

                

2011                              None                

 

                    otalgia                   Onset and Resolution              

     sudden in onset

                          2011                   None                

 

                    otalgia                   Onset of Symptom                  

 _ weeks ago        

                          2011                   None                

 

                otalgia                   Severity                   moderate   

                

2011                              None                

 

                    otalgia                   Frequency of Episodes             

      increasing    

                          2011                   None                

 

                    sore throat                   Location                   on 

both sides          

                          2011                   None                

 

                    sore throat                   Quality                   cons

tant                

                          2011                   None                

 

                    sore throat                   Quality                   achi

ng                  

                          2011                   None                

 

                    sore throat                   Quality                   scra

tchy                

                          2011                   None                

 

                    sore throat                   Onset and Resolution          

         sudden in 

onset                     2011                   None                

 

                    sore throat                   Onset of Symptom              

     _ weeks ago    

                          2011                   None                

 

                    headache                   Location                   in the

 occipital area     

                          2011                   None                

 

                headache                   Quality                   aching     

              

2011                              None                

 

                headache                   Quality                   constant   

                

2011                              None                

 

                    headache                   Quality                   throbbi

ng                  

                          2011                   None                

 

                    headache                   Onset and Resolution             

      sudden in 

onset                     2011                   None                

 

                    headache                   Onset of Symptom                 

  1 weeks ago       

                          2011                   None                

 

                    headache                   Limitation on Activities         

          moderately

limits activities                   2011                   None           

    

 

                    headache                   Location                   in the

 frontal area       

                          2011                   None                

 

                    back pain                   Location                   thora

cic spine           

                          2011                   None                

 

                    back pain                   Quality                   improv

ing with vimovo     

                          2011                   None                

 

                    abdominal pain                   Quality                   i

mproving            

                          2011                   None                

 

                    otalgia                   Location                   on the 

right               

                          2011                   None                

 

                    dizziness                   Quality                   sense 

of room spinning    

                          2011                   None                

 

                dizziness                   Quality                   vertigo   

                

2011                              None                

 

                    dizziness                   Quality                   interm

ittent              

                          2011                   None                

 

                    back pain                   Location                   thora

cic spine           

                          2011                   None                

 

                back pain                   Quality                   aching    

               

2011                              None                

 

                    otitis media                   Quality                   acu

te                  

                          2011                   None                

 

                dizziness                   Quality                   acute     

              

2011                              None                

 

                    abdominal pain                   Quality                   i

ntermittent         

                          2011                   None                

 

                    hip pain                   Location                   on the

 right              

                          2011                   None                

 

                    hip pain                   Quality                   improve

d very little from 

last week                   2011                   None                

 

                    hip pain                   Quality                   worsene

d with twisting     

                          2011                   None                

 

                    follow up                   Illness                   rocky

ms improved         

                          2011                   but still having problems

                

 

                hip pain                   Quality                   constant   

                

2011                              None                

 

                    hip pain                   Onset of Symptom                 

  8 hours ago       

                          2011                   noticed when woke up this

 morning       

        

 

                    hip pain                   Quality                   sharp p

ain                 

                          2011                   None                

 

                    hip pain                   Location                   on the

 right              

                          2011                   did walk on treadmill yes

terday with no shoes  

             

 

                    hip pain                   Location                   in the

 buttocks           

                          2011                   None                

 

                    hip pain                   Location                   in the

 groin              

                          2011                   None                

 

                    low blood pressure                   Quality                

   worsening        

                          2011                   None                

 

                    depression                   Onset and Resolution           

        ongoing     

                          2011                   thinks needs lexapro dose

 increased   

            

 

                fatigue                   Quality                   improving   

                

2010                              None                

 

                    muscle weakness                   Quality                   

improving           

                          2010                   None                

 

                    follow up                   Illness                   rocky

ms improved         

                          2010                   with Lexapro             

   

 

                    urinary urgency                   Onset and Resolution      

             ongoing

                          2010                   but improving with increa

sed 

water intake                

 

                    follow up                   Illness                   rocky

ms improved         

                          2010                   had weakness and shakes--

was busy week 

getting ready for granddaughter's wedding                

 

                    weakness                   Quality                   muscle 

weakness            

                          2010                   None                

 

                    dyskinesia or tremor                   Quality              

     acute          

                          2010                   came on out of blue      

          

 

                    follow up                   Illness                   rocky

ms remained 

unchanged                   2010                   still with fatigue, but

did have few days of increased energy after B12 shot                

 

                    constipation                   Quality                   imp

roving              

                          2010                   with stool softeners     

           

 

                    fatigue                   Onset and Resolution              

     ongoing        

                          2010                   None                

 

                    gastroesophageal reflux                   Quality           

        chronic     

                          2010                   with hiatal hernia and po

ssible early 

Shields's                

 

                    constipation                   Quality                   imp

roving              

                          2010                   with stool softener and p

robiotic--area in 

colon where couldn't get scope through                

 

                melena                   Quality                   black        

           

2010                              at times prior to scopes                

 

                fatigue                   Quality                   chronic     

              

2010                              on iron but not B12                

 

                    weakness                   Quality                   general

ized fatigue        

                          2010                   None                

 

                    weakness                   Quality                   decreas

ed energy           

                          2010                   None                

 

                    follow up                   Reason                   [facility]

s check             

                          2010                   Having trouble with gener

alized "shakiness" 

and overwhelming fatigue                

 

                    fatigue                   Onset of Symptom                  

 1 months ago       

                          2010                   None                

 

                fatigue                   Quality                   worsening   

                

2010                              None                

 

                    fatigue                   Limitation on Activities          

         moderately 

limits activities                   2010                   Able to 

maintain household, but has several days in a row where fatigue is overwhelming.
               

 

                    weakness                   Onset of Symptom                 

  1 months ago      

                          2010                   None                

 

                    weakness                   Frequency of Episodes            

       increasing   

                          2010                   None                

 

                    weakness                   Length of Episodes               

    2-3 days        

                          2010                   None                

 

                    weakness                   Limitation on Activities         

          moderately

limits activities                   2010                   None           

    

 

                    weakness                   Alleviating Factors              

     rest           

                          2010                   None                

 

                    weakness                   Quality                   muscle 

weakness            

                          2010                   Bilat leg/knee weakness a

nd feels 

shakey--xanax does help                

 

                fatigue                   Quality                   chronic     

              

2010                              None                

 

                    weakness                   Onset and Resolution             

      ongoing       

                          2010                   started after did 2days o

f extenive 

house cleaning                

 

                    disturbances of emotion                   Quality           

        chronic     

                          2010                   gets upset easily        

        



                                                                              



Advance Directives

          No Advance Directive data                                             
                      



Encounters

                      



                    Encounter                   Performer                   Loca

tion                

                          Codes                     Date                

 

                                                            ([id number]) OFFICE/OUTPA

TIENT VISIT EST

Diagnosis: Acute serous otitis media, left ear[ICD10: H65.02]                   
                          Nat Lozoya                   AKANKSHA SAramis "[employer]

                          CPT-4: [id number]                   2019             

   

 

                                                            ([id number]) OFFICE/OUTPA

TIENT VISIT EST

Diagnosis: Essential (primary) hypertension[ICD10: I10]

Diagnosis: Coronary atherosclerosis due to calcified coronary lesion[ICD10: 
I25.84]

Diagnosis: Hypoglycemia, unspecified[ICD10: E16.2]

Diagnosis: Other fatigue[ICD10: R53.83]

Diagnosis: Urinary tract infection, site not specified[ICD10: N39.0]            
                          Akanksha BAUMAN SAramis 

[employer]                   CPT-4: [id number]                   06/10/2019      

         

 

                                                            ([id number]) OFFICE/OUTPA

TIENT VISIT EST

Diagnosis: Essential (primary) hypertension[ICD10: I10]

Diagnosis: Gastro-esophageal reflux disease without esophagitis[ICD10: K21.9]

Diagnosis: Urinary tract infection, site not specified[ICD10: N39.0]            
                          Akanksha BAUMAN SAramis 

[employer]                   CPT-4: 87721                   2019      

         

 

                                                            ([id number]) OFFICE/OUTPA

TIENT VISIT EST

Diagnosis: Gastro-esophageal reflux disease without esophagitis[ICD10: K21.9]

Diagnosis: Epigastric pain[ICD10: R10.13]                                     

Akanksha DRIVER [facility]            

   

                          CPT-4: 53509                   2018             

   

 

                                                            ([id number]) OFFICE/OUTPA

TIENT VISIT EST

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

Diagnosis: Essential (primary) hypertension[ICD10: I10]

Diagnosis: Generalized anxiety disorder[ICD10: F41.1]

Diagnosis: Gastro-esophageal reflux disease without esophagitis[ICD10: K21.9]   
                                 Akanksha DRIVER [facility]                   CPT-4: 60547                   2018   

            

 

                                                            OFFICE/OUTPATIENT VI

SIT EST

Diagnosis: Gastro-esophageal reflux disease without esophagitis[ICD10: K21.9]

Diagnosis: Palpitations[ICD10: R00.2]

Diagnosis: Urinary tract infection, site not specified[ICD10: N39.0]

Diagnosis: Generalized anxiety disorder[ICD10: F41.1]                           
                          Akanksha MEJIA [facility]      

                          CPT-4: 70166                   10/02/2018             

   

 

                                                            (20822) NURSE/OUTPAT

IENT VISIT EST

Diagnosis: Coronary atherosclerosis due to calcified coronary lesion[ICD10: 
I25.84]

Diagnosis: Hypoglycemia, unspecified[ICD10: E16.2]

Diagnosis: Dizziness and giddiness[ICD10: R42]

Diagnosis: Occlusion and stenosis of bilateral carotid arteries[ICD10: I65.23]  
                                  Akanksha DRIVER [employer] [facility]                   CPT-4: 53436                   

2018                

 

                                                            OFFICE/OUTPATIENT VI

SIT EST

Diagnosis: Epigastric pain[ICD10: R10.13]

Diagnosis: Generalized anxiety disorder[ICD10: F41.1]

Diagnosis: FLU VACCINE[ICD10: Z23]                                     

Akanksha DRIVER [employer] [facility]            

   

                          CPT-4: 67867                   2018             

   

 

                                                            (26806) OFFICE/OUTPA

TIENT VISIT EST

Diagnosis: Essential (primary) hypertension[ICD10: I10]

Diagnosis: Hypoglycemia, unspecified[ICD10: E16.2]

Diagnosis: Gastro-esophageal reflux disease without esophagitis[ICD10: K21.9]   
                                 Akanksha DRIVER [facility]                   CPT-4: 23047                   2018   

            

 

                                                            (83782) OFFICE/OUTPA

TIENT VISIT EST

Diagnosis: Dizziness and giddiness[ICD10: R42]                                  
                          Nat KEY [facility]               

                          CPT-4: 71159                   2018             

   

 

                                                            (28385) OFFICE/OUTPA

TIENT VISIT EST

Diagnosis: Cervicalgia[ICD10: M54.2]

Diagnosis: Other spondylosis with radiculopathy, cervical region[ICD10: M47.22] 
                                   Akanksha DRIVER [facility]                   CPT-4: 87840                   

2018                

 

                                                            (79512) OFFICE/OUTPA

TIENT VISIT EST

Diagnosis: Hypoglycemia, unspecified[ICD10: E16.2]

Diagnosis: Other spondylosis with radiculopathy, cervical region[ICD10: M47.22]

Diagnosis: Vertigo of central origin, bilateral[ICD10: H81.43]

Diagnosis: Cervicocranial syndrome[ICD10: M53.0]                                
                          Akanksha MEJIA [facility]           

                          CPT-4: 97565                   2018             

   

 

                                                            (31861) OFFICE/OUTPA

TIENT VISIT EST

Diagnosis: Benign paroxysmal vertigo, bilateral[ICD10: H81.13]

Diagnosis: Otalgia, bilateral[ICD10: H92.03]                                    
                          Nat SPENCER

[facility]                 

                          CPT-4: 93256                   2018             

   

 

                                                            (09917) OFFICE/OUTPA

TIENT VISIT EST

Diagnosis: Low back pain[ICD10: M54.5]

Diagnosis: Radiculopathy, lumbosacral region[ICD10: M54.17]

Diagnosis: Left lower quadrant pain[ICD10: R10.32]                              
                          Akanksha MEJIA [facility]         

                          CPT-4: 62732                   2018             

   

 

                                                            (33496) OFFICE/OUTPA

TIENT VISIT EST

Diagnosis: FLU VACCINE[ICD10: Z23]                                     

Akanksha DRIVER [facility]            

   

                          CPT-4: 19053                   10/06/2017             

   

 

                                                            (05850) OFFICE/OUTPA

TIENT VISIT EST

Diagnosis: Mixed hyperlipidemia[ICD10: E78.2]

Diagnosis: Essential (primary) hypertension[ICD10: I10]

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

Diagnosis: Impaired fasting glucose[ICD10: R73.01]

Diagnosis: Other fatigue[ICD10: R53.83]                                     

Akanksha DRIVER [employer]            

   

                          CPT-4: 13921                   2017             

   

 

                                                            (04580) OFFICE/OUTPA

TIENT VISIT EST

Diagnosis: Pain in thoracic spine[ICD10: M54.6]

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 
                                   Akanksha DRIVER [employer] [facility]                   CPT-4: 73541                   

2017                

 

                                                            (37578) OFFICE/OUTPA

TIENT VISIT EST

Diagnosis: Left lower quadrant pain[ICD10: R10.32]

Diagnosis: Low back pain[ICD10: M54.5]                                     

Akanksha DRIVER [employer] [facility]            

   

                          CPT-4: 89679                   2017             

   

 

                                                            (79146) OFFICE/OUTPA

TIENT VISIT EST

Diagnosis: Mixed hyperlipidemia[ICD10: E78.2]

Diagnosis: Essential (primary) hypertension[ICD10: I10]

Diagnosis: Hypoglycemia, unspecified[ICD10: E16.2]                              
                          Akanksha MEJIA [employer] [facility]         

                          CPT-4: 92254                   2017             

   

 

                                                            (67033) OFFICE/OUTPA

TIENT VISIT EST

Diagnosis: Impaired fasting glucose[ICD10: R73.01]

Diagnosis: Mastodynia[ICD10: N64.4]

Diagnosis: Mixed hyperlipidemia[ICD10: E78.2]

Diagnosis: Essential (primary) hypertension[ICD10: I10]

Diagnosis: Other fatigue[ICD10: R53.83]                                     

Akanksha DRIVER [employer] [facility]            

   

                          CPT-4: 76811                   2017             

   

 

                                                            (16211) OFFICE/OUTPA

TIENT VISIT EST

Diagnosis: Acute upper respiratory infection, unspecified[ICD10: J06.9]         
                           Luba Stevenson                   AKANKSHA DRIVER [employer] [facility]                   CPT-4: 44677                   2017      

         

 

                                                            (57321) OFFICE/OUTPA

TIENT VISIT EST

Diagnosis: Acute mastoiditis without complications, right ear[ICD10: H70.001]   
                                 Akanksha DRIVER [facility]                   CPT-4: 77758                   2016   

            

 

                                                            (63760) OFFICE/OUTPA

TIENT VISIT EST

Diagnosis: FLU VACCINE[ICD10: Z23]                                     

Akanksha DRIVER DO [facility]            

   

                          CPT-4: 22994                   10/07/2016             

   

 

                                                            (57801) OFFICE/OUTPA

TIENT VISIT EST

Diagnosis: Mixed hyperlipidemia[ICD10: E78.2]

Diagnosis: Essential (primary) hypertension[ICD10: I10]

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

Diagnosis: Impaired fasting glucose[ICD10: R73.01]                              
                          Akanksha MEJIA [facility]         

                          CPT-4: 01697                   2016             

   

 

                                                            (18369) OFFICE/OUTPA

TIENT VISIT EST

Diagnosis: Constipation, unspecified[ICD10: K59.00]                             
                          Akanksha MEJIA [facility]        

                          CPT-4: 64795                   2016             

   

 

                                                            (82210) OFFICE/OUTPA

TIENT VISIT EST

Diagnosis: Essential (primary) hypertension[ICD10: I10]

Diagnosis: Mixed hyperlipidemia[ICD10: E78.2]

Diagnosis: Chronic kidney disease, stage 1[ICD10: N18.1]                        
                          Akanksha MEJIA [facility]   

                          CPT-4: 75275                   2016             

   

 

                                                            (53519) OFFICE/OUTPA

TIENT VISIT EST

Diagnosis: Muscle weakness (generalized)[ICD10: M62.81]

Diagnosis: Dizziness and giddiness[ICD10: R42]

Diagnosis: Essential (primary) hypertension[ICD10: I10]

Diagnosis: History of falling[ICD10: Z91.81]                                    
                          Akanksha MEJIA [facility]               

                          CPT-4: 54324                   2015             

   

 

                                                            (03321) OFFICE/OUTPA

TIENT VISIT EST

Diagnosis: FLU VACCINE[ICD10: Z23]                                     

Akanksha DRIVER [facility]            

   

                          CPT-4: 67589                   10/16/2015             

   

 

                                                            (71577) OFFICE/OUTPA

TIENT VISIT EST

Diagnosis: Acute renal failure[ICD9: 584.9]

Diagnosis: POLYURIA[ICD9: 788.42]                                     Akanksha DRIVER [facility]                   CPT-4: 

03400                                   09/15/2015                

 

                                                            (50302) OFFICE/OUTPA

TIENT VISIT EST

Diagnosis: HYPERLIPIDEMIA NEC/NOS[ICD9: 272.4]

Diagnosis: HYPERTENSION[ICD9: 401.9]

Diagnosis: CAD[ICD9: 414.00]

Diagnosis: Hyperglycemia[ICD9: 790.29]

Diagnosis: MALAISE AND FATIGUE[ICD9: 780.79]                                    
                          Akanksha MEJIA [facility]               

                          CPT-4: 36160                   09/10/2015             

   

 

                                                            (10215) OFFICE/OUTPA

TIENT VISIT EST

Diagnosis: MALAISE AND FATIGUE[ICD9: 780.79]

Diagnosis: HYPOGLYCEMIA[ICD9: 251.2]

Diagnosis: Grieving[ICD9: 309.0]

Diagnosis: ABDOMINAL PAIN[ICD9: 789.00]                                     

Akanksha DRIVER [facility]            

   

                          CPT-4: 64520                   2015             

   

 

                                                            OFFICE/OUTPATIENT VI

SIT EST

Diagnosis: CERUMEN IMPACTION[ICD9: 380.4]

Diagnosis: EUSTACHIAN TUBE DYSFUNCTION[ICD9: 381.81]                            
                          Bertha Farleyrubystephane                   AKANKSHA SAramis PAINTER

MONY [facility]     

                          CPT-4: 54860                   2015             

   

 

                                                            (96410) OFFICE/OUTPA

TIENT VISIT EST

Diagnosis: Leg pain[ICD9: 729.5]

Diagnosis: SUPERFIC PHLEBITIS-LEG[ICD9: 451.0]                                  
                          Akanksha MEJIA [facility]             

                          CPT-4: 77688                   2015             

   

 

                                                            (54277) OFFICE/OUTPA

TIENT VISIT EST

Diagnosis: Thoracic back pain[ICD9: 724.1]

Diagnosis: SPASM OF MUSCLE[ICD9: 728.85]                                     

Akanksha DRIVER [facility]            

   

                          CPT-4: 32134                   2015             

   

 

                                                            (10761) OFFICE/OUTPA

TIENT VISIT EST

Diagnosis: DIZZINESS/VERTIGO[ICD9: 780.4]

Diagnosis: Benign positional vertigo[ICD9: 386.11]

Diagnosis: HYPERTENSION[ICD9: 401.9]

Diagnosis: Suspicious nevus[ICD9: 238.2]                                     

Akanksha DRIVER [facility]            

   

                          CPT-4: 60782                   2015             

   

 

                                                            (10410) OFFICE/OUTPA

TIENT VISIT EST

Diagnosis: FLU VACCINE[ICD10: Z23]                                     

Akanksha DRIVER DO [facility]            

   

                          CPT-4: 86929                   10/17/2014             

   

 

                                                            OFFICE/OUTPATIENT VI

SIT EST

Diagnosis: SINUSITIS, ACUTE[ICD9: 461.9]

Diagnosis: EUSTACHIAN TUBE DYSFUNCTION[ICD9: 381.81]                            
                          Bertha SYKES [facility]     

                          CPT-4: 88512                   2014             

   

 

                                                            (02701) OFFICE/OUTPA

TIENT VISIT EST

Diagnosis: DIZZINESS/VERTIGO[ICD9: 780.4]

Diagnosis: HYPERTENSION[ICD9: 401.9]                                     

Akanksha DRIVER [facility]            

   

                          CPT-4: 07447                   2014             

   

 

                                                            (95687) OFFICE/OUTPA

TIENT VISIT EST

Diagnosis: HYPERTENSION[ICD9: 401.9]

Diagnosis: MALAISE AND FATIGUE[ICD9: 780.79]

Diagnosis: SINUSITIS, ACUTE[ICD9: 461.9]                                     

Akanksha DRIVER [facility]            

   

                          CPT-4: 53232                   2014             

   

 

                                                            (35390) OFFICE/OUTPA

TIENT VISIT EST

Diagnosis: HYPERLIPIDEMIA NEC/NOS[ICD9: 272.4]

Diagnosis: HYPERTENSION[ICD9: 401.9]

Diagnosis: B12 DEFIC ANEMIA NEC[ICD9: 281.1]

Diagnosis: HYPOGLYCEMIA[ICD9: 251.2]                                     

Akanksha DRIVER DO [facility]            

   

                          CPT-4: 26533                   2014             

   

 

                                                            OFFICE/OUTPATIENT VI

SIT EST

Diagnosis: COUGH[ICD9: 786.2]                                     Bertha DRIVER [facility]                   

CPT-4: 81870                            2014                

 

                                                            OFFICE/OUTPATIENT VI

SIT EST

Diagnosis: COUGH[ICD9: 786.2]

Diagnosis: URI, ACUTE[ICD9: 465.9]                                     Bertha DRIVER [facility]                   

CPT-4: 46535                            10/15/2013                

 

                                                            (89834) OFFICE/OUTPA

TIENT VISIT EST

Diagnosis: HYPERTENSION[ICD9: 401.9]

Diagnosis: CEPHALGIA[ICD9: 784.0]

Diagnosis: ANXIETY STATE NOS[ICD9: 300.00]

Diagnosis: FLU VACCINE[ICD9: V04.81]                                     

Akanksha DRIVER [facility]            

   

                          CPT-4: 63709                   2013             

   

 

                                                            (92452) OFFICE/OUTPA

TIENT VISIT EST

Diagnosis: DIZZINESS/VERTIGO[ICD9: 780.4]

Diagnosis: SINUSITIS, ACUTE[ICD9: 461.9]

Diagnosis: Benign positional vertigo[ICD9: 386.11]                              
                          Akanksha MEJIA [facility]         

                          CPT-4: 67765                   2013             

   

 

                                                            OFFICE/OUTPATIENT VI

SIT EST

Diagnosis: OTITIS MEDIA NOS[ICD9: 382.9]                                     

[facility]            

   

                          CPT-4: 93961                   2013             

   

 

                                                            OFFICE/OUTPATIENT VI

SIT EST

Diagnosis: Perforation of ear drum[ICD9: 384.20]

Diagnosis: SINUSITIS, ACUTE[ICD9: 461.9]                                     

[facility]            

   

                          CPT-4: 20426                   05/10/2013             

   

 

                                                            (77316) OFFICE/OUTPA

TIENT VISIT EST

Diagnosis: Mastalgia[ICD9: 611.71]                                     

Akanksha DRIVER [facility]            

   

                          CPT-4: 44979                   2013             

   

 

                                                            (31055) OFFICE/OUTPA

TIENT VISIT EST

Diagnosis: HYPERLIPIDEMIA NEC/NOS[ICD9: 272.4]

Diagnosis: HYPERTENSION[ICD9: 401.9]

Diagnosis: DIZZINESS/VERTIGO[ICD9: 780.4]

Diagnosis: Hyperglycemia[ICD9: 790.29]

Diagnosis: MALAISE AND FATIGUE[ICD9: 780.79]                                    
                          Akanksha MEJIA [facility]               

                          CPT-4: 30282                   2012             

   

 

                                                            (65747) OFFICE/OUTPA

TIENT VISIT EST

Diagnosis: EUSTACHIAN TUBE DYSFUNCTION[ICD9: 381.81]

Diagnosis: DIZZINESS/VERTIGO[ICD9: 780.4]

Diagnosis: ABDOMINAL PAIN[ICD9: 789.00]

Diagnosis: GERD[ICD9: 530.81]

Diagnosis: CERUMEN IMPACTION[ICD9: 380.4]                                     

Akanksha DRIVER DO [facility]            

   

                          CPT-4: 41926                   2012             

   

 

                                                            OFFICE/OUTPATIENT VI

SIT EST

Diagnosis: ABDOMINAL PAIN[ICD9: 789.00]

Diagnosis: DYSPEPSIA[ICD9: 536.8]                                     Akanksha DRIVER DO [facility]                   CPT-4: 

84335                                   10/23/2012                

 

                                                            (00011) OFFICE/OUTPA

TIENT VISIT EST

Diagnosis: ABDOMINAL PAIN[ICD9: 789.00]

Diagnosis: DYSPEPSIA[ICD9: 536.8]

Diagnosis: IBS[ICD9: 564.1]                                     Akanksha DRIVER DO [facility]                   CPT-4: 

43794                                   10/10/2012                

 

                                                            OFFICE/OUTPATIENT VI

SIT EST

Diagnosis: DIZZINESS/VERTIGO[ICD9: 780.4]

Diagnosis: HYPOGLYCEMIA[ICD9: 251.2]                                     

Akanksha DRIVER DO [facility]            

   

                          CPT-4: 84888                   2012             

   

 

                                                            (74270) OFFICE/OUTPA

TIENT VISIT EST

Diagnosis: URINARY TRACT INFECTION[ICD9: 599.0]                                 
                          Akanksha MEJIA [facility]            

                          CPT-4: 20289                   2012             

   

 

                                                            (64991) OFFICE/OUTPA

TIENT VISIT EST

Diagnosis: HYPERLIPIDEMIA NEC/NOS[ICD9: 272.4]

Diagnosis: HYPERTENSION[ICD9: 401.9]

Diagnosis: MALAISE AND FATIGUE[ICD9: 780.79]

Diagnosis: DIZZINESS/VERTIGO[ICD9: 780.4]                                     

Akanksha DRIVER DO [facility]            

   

                          CPT-4: 49694                   2012             

   

 

                                                            (20374) OFFICE/OUTPA

TIENT VISIT EST

Diagnosis: DIZZINESS/VERTIGO[ICD9: 780.4]

Diagnosis: MALAISE AND FATIGUE[ICD9: 780.79]

Diagnosis: HYPERTENSION[ICD9: 401.9]                                     

Akanksha DRIVER DO [facility]            

   

                          CPT-4: 11599                   2012             

   

 

                                                            OFFICE/OUTPATIENT VI

SIT EST

Diagnosis: LUMB/LUMBOSAC DISC DEGEN[ICD9: 722.52]

Diagnosis: Radiculopathy of leg[ICD9: 724.4]

Diagnosis: SACROILIITIS NEC[ICD9: 720.2]

Diagnosis: SPINAL ENTHESOPATHY[ICD9: 720.1]                                     

Akanksha DRIVER [employer] [facility]            

   

                          CPT-4: 92722                   2012             

   

 

                                                            (27873) OFFICE/OUTPA

TIENT VISIT EST

Diagnosis: PAIN, LOWER BACK[ICD9: 724.2]

Diagnosis: SPASM OF MUSCLE[ICD9: 728.85]

Diagnosis: SCIATICA[ICD9: 724.3]

Diagnosis: Lumbar degenerative disc disease[ICD9: 722.52]                       
                          Akanksha HIGHTOWERLINE OLIVIA MEJIA [employer] [facility]  

                          CPT-4: 23545                   2012             

   

 

                                                            (97141) OFFICE/OUTPA

TIENT VISIT EST

Diagnosis: PAIN IN THORACIC SPINE[ICD9: 724.1]

Diagnosis: SPASM OF MUSCLE[ICD9: 728.85]                                     

Akanksha CHEATHAMAramis VILLA [employer] [facility]            

   

                          CPT-4: 80099                   2012             

   

 

                                                            (38803) OFFICE/OUTPA

TIENT VISIT EST

Diagnosis: PAIN, LOWER BACK[ICD9: 724.2]

Diagnosis: SPASM OF MUSCLE[ICD9: 728.85]

Diagnosis: Sacroiliac dysfunction[ICD9: 739.4]

Diagnosis: Lumbar degenerative disc disease[ICD9: 722.52]                       
                          Akanksha Otoolendangela HIGHTOWERLINE OLIVIA MEJIA [employer] [facility]  

                          CPT-4: 36386                   2012             

   

 

                                                            OFFICE/OUTPATIENT VI

SIT EST

Diagnosis: MALAISE AND FATIGUE[ICD9: 780.79]

Diagnosis: HYPOTENSION[ICD9: 458.9]

Diagnosis: CAD[ICD9: 414.00]                                     Akanksha 

[place]                   AKANKSHA SAramis VILL[place] [employer] [facility]                   CPT-4: 

63312                                   2012                

 

                                                            OFFICE/OUTPATIENT VI

SIT EST

Diagnosis: SINUSITIS, ACUTE[ICD9: 461.9]

Diagnosis: PHARYNGITIS, ACUTE[ICD9: 462]

Diagnosis: CERUMEN IMPACTION[ICD9: 380.4]                                     

Akanksha BAUMAN S. [facility]            

   

                          CPT-4: 70326                   2011             

   

 

                                                            OFFICE/OUTPATIENT VI

SIT EST

Diagnosis: PAIN IN THORACIC SPINE[ICD9: 724.1]

Diagnosis: GERD[ICD9: 530.81]

Diagnosis: DIZZINESS/VERTIGO[ICD9: 780.4]                                     

Akanksha BAKER [facility]            

   

                          CPT-4: 10593                   2011             

   

 

                                                            OFFICE/OUTPATIENT VI

SIT EST                                 

                          Akanksha BAKER ORE

[facility]            

                          CPT-4: 61628                   2011             

   

 

                                                            ([id number]) OFFICE/OUTPA

TIENT VISIT EST                         

                          Akanksha BAUMAN S. ORE

[facility]    

                          CPT-4: 57154                   2011             

   

 

                                                            (46754) OFFICE/OUTPA

TIENT VISIT, EST

Diagnosis: PAIN, LOWER BACK[ICD9: 724.2]                                     

Akanksha BAUMAN [facility]Aramis [facility]            

   

                          CPT-4: 59147                   2011             

   

 

                                                            (51991) OFFICE/OUTPA

TIENT VISIT, EST                        

                          Akanksha BAUMAN S. ORE

[facility]   

                          CPT-4: 20126                   2011             

   

 

                                                            (42483) OFFICE/OUTPA

TIENT VISIT, EST                        

                          Akanksha BAUMAN S. ORE

[facility]   

                          CPT-4: 08523                   2010             

   

 

                                                            (33654) OFFICE/OUTPA

TIENT VISIT, EST                        

                          Akanksha BAUMAN S. ORE

[facility]   

                          CPT-4: 23199                   2010             

   

 

                                                            (98893) OFFICE/OUTPA

TIENT VISIT, EST                        

                          Akanksha BAUMAN S. ORE

[facility]   

                          CPT-4: 53404                   2010             

   

 

                                                            (74200) OFFICE/OUTPA

TIENT VISIT, EST                        

                          Akanksha BAUMAN S. ORE

[facility]   

                          CPT-4: 19756                   2010             

   

 

                                                            (98378) OFFICE/OUTPA

TIENT VISIT, EST                        

                          Akanksha BAUMAN S. ORE

[facility]   

                          CPT-4: 33676                   2010             

   

 

                                                            (38894) OFFICE/OUTPA

TIENT VISIT, EST                        

                          Akanksha BAUMAN S. ORE

[facility]   

                          CPT-4: 07810                   2010             

   



                                                                                
                                                                                
                                                                                
                                                                                
                                                                                
                                                                                
                                                                                
                                                                                
                                                                                
                                                                                
                            



Plan of Care

                      



                    Planned Activity                   Notes                   C

odes                

                          Status                    Date                

 

                          Visit Diagnosis Plan: Acute serous otitis media, left 

ear                   

Discussion: instructed to use flonase and claritin daily for symptom relief. 
discussed with patient that if no improvement in 2 weeks, will need to follow up
with ENT for audiology exam. instructed to call office with any other symptoms 
such as otalgia, dysphagia, fever, etc.

                                        ICD-9 : 381.01

ICD-10 : H65.02

                                                    2019                

 

                                        Appointment: Nat Lozoya

                                        [address] [place], [place] [id number]                                                           ACUTE ILLNESS      

 

                                        2019                

 

                                        Visit Diagnosis Plan: Urinary tract infe

ction, site not specified               

                                        Discussion: Started an old abx prescript

ion

                                        ICD-9 : 599.0

ICD-10 : N39.0

                                                    06/10/2019                

 

                          Visit Diagnosis Plan: Hypoglycemia, unspecified       

            Discussion: 

Monitor BS At least 6 small meals a day each with protein

                                        ICD-9 : 251.2

ICD-10 : E16.2

                                                    06/10/2019                

 

                          Visit Diagnosis Plan: Essential (primary) hypertension

                   

Discussion: Stable Check CMP

                                        ICD-9 : 401.9

ICD-10 : I10

                                                    06/10/2019                

 

                          Visit Diagnosis Plan: Other fatigue                   

Discussion: Check CBC, TSH

                                        ICD-9 : 780.79

ICD-10 : R53.83

                                                    06/10/2019                

 

                                        Appointment: Akanksha Driver

WPtel:[phone]

                                        [address] [place], [place] [id number]

                                                           FOLLOW UP          

 

                                        06/10/2019                

 

                          Visit Diagnosis Plan: Essential (primary) hypertension

                   

Discussion: Stable Sees Dr. Clements this month

                                        ICD-9 : 401.9

ICD-10 : I10

                                                    2019                

 

                                        Visit Diagnosis Plan: Urinary tract infe

ction, site not specified               

                                        Discussion: Macrobid 100mg po BID for 1 

week

                                        ICD-9 : 599.0

ICD-10 : N39.0

                                                    2019                

 

                                        Visit Diagnosis Plan: Gastro-esophageal 

reflux disease without esophagitis      

                                        Discussion: Stable on omeprazole

Follow Up: 3 months

                                        ICD-9 : 530.81

ICD-10 : K21.9

                                                    2019                

 

                                        Appointment: Akanksha Driver

WPtel:[phone]

                                        [address] [place], [place] [address]

[place]                                                           FOLLOW UP          

 

                                        2019                

 

                                        Patient Education: metoprolol tartrate- 

[employer] Coupon [id number]              

                                        

[email or web address]/[email or web address]

[email or web address]                                             Completed       

      

                                        2019                

 

                                        Appointment: Akanksha Driver

WPtel:[phone]

                                        [address] [facility]

[place]                                                           CANCELED           

 

                                        02/15/2019                

 

                                        Visit Diagnosis Plan: Gastro-esophageal 

reflux disease without esophagitis      

                                        Discussion: Continue protonix and carafa

te Proceed with updated EGD

                                        ICD-9 : 530.81

ICD-10 : K21.9

                                                    2018                

 

                          Visit Diagnosis Plan: Epigastric pain                 

  Discussion: Check CT 

abdomen/pelvis due to ongoing abdominal pain

                                        ICD-9 : 789.06

ICD-10 : R10.13

                                                    2018                

 

                                        Appointment: Akanksha Driver

WPtel:[phone]

                                        [address] [facility]

[place]                                                           FOLLOW UP          

 

                                        2018                

 

                    Care Plan: CT PELVIS W/O DYE                                

       LOINC : 

05272-5

                          Pending                   2018                

 

                    Care Plan: CT ABDOMEN W/O DYE                               

        LOINC : 

59869-4

                          Pending                   2018                

 

                    Care Plan: Referral Order                                   

    SNOMED-CT : 

[id number]

                          Pending                   2018                

 

                                        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

art disease of native coronary artery 

without angina pectoris                   Discussion: S/P cardiac cath--doing 

medical management with imdur and metoprolol increased Has fwup with cardiology 
next week Discussed cardiac rehab

                                        ICD-9 : 414.00

ICD-10 : I25.10

                                                    2018                

 

                          Visit Diagnosis Plan: Generalized anxiety disorder    

               Discussion:

Stable

                                        ICD-9 : 300.00

ICD-10 : F41.1

                                                    2018                

 

                                        Visit Diagnosis Plan: Gastro-esophageal 

reflux disease without esophagitis      

                                        Discussion: Continue protonix at BID dos

ing and carafate BID

Follow Up: 2 months

                                        ICD-9 : 530.81

ICD-10 : K21.9

                                                    2018                

 

                          Visit Diagnosis Plan: Essential (primary) hypertension

                   

Discussion: Stable

                                        ICD-9 : 401.9

ICD-10 : I10

                                                    2018                

 

                                        Appointment: Akanksha Driver

WPtel:[phone]

                                        [address] [place], [place] [id number]

[place]                                                           FOLLOW UP          

 

                                        2018                

 

                                        Visit Diagnosis Plan: Gastro-esophageal 

reflux disease without esophagitis      

                                        Discussion: Continue protonix at BID dos

ing Continue carafate at q 

AC and HS dosing for 2 more weeks then decrease to BID dosing

Follow Up: 4 weeks

                                        ICD-9 : 530.81

ICD-10 : K21.9

                                                    10/02/2018                

 

                                        Visit Diagnosis Plan: Urinary tract infe

ction, site not specified               

                                        Discussion: Reculture urine

                                        ICD-9 : 599.0

ICD-10 : N39.0

                                                    10/02/2018                

 

                          Visit Diagnosis Plan: Generalized anxiety disorder    

               Discussion:

Increase xanax to BID dosing especially with upcoming cardiac testing which is 
already making patient more anxious and worried

                                        ICD-9 : 300.00

ICD-10 : F41.1

                                                    10/02/2018                

 

                          Visit Diagnosis Plan: Palpitations                   D

iscussion: Cardilology 

going to schedule Lexiscan

                                        ICD-9 : 785.1

ICD-10 : R00.2

                                                    10/02/2018                

 

                                        Appointment: Akanksha Driver

WPtel:[phone]

                                        [address] [place] [address]                                                           FOLLOW UP          

 

                                        10/02/2018                

 

                          Patient Education: Patient Medication Summary         

                          

                                        Completed                   10/02/2018  

              

 

                                        Appointment: Akanksha Driver

WPtel:[phone]

                                        [address] [place], [place] [id number]                                                           LAB                

 

                                        2018                

 

                          Patient Education: Patient Medication Summary         

                          

                                        Completed                   2018  

              

 

                          Visit Diagnosis Plan: Epigastric pain                 

  Discussion: Continue 

protonix and carafate but make into a slurry Flu shot given Discussed EGD if 
symptoms persist

Follow Up: 2 weeks

                                        ICD-9 : 789.06

ICD-10 : R10.13

                                                    2018                

 

                          Visit Diagnosis Plan: Generalized anxiety disorder    

               Discussion:

Did discuss increased risk of benzodiazepines causing dementia but at this time 
will stay at xanax 0.25mg po BID

                                        ICD-9 : 300.00

ICD-10 : F41.1

                                                    2018                

 

                                        Appointment: Akanksha Driver

WPtel:[phone]

                                        [address] [place] [address]                                                           FOLLOW UP          

 

                                        2018                

 

                          Patient Education: Patient Medication Summary         

                          

                                        Completed                   2018  

              

 

                          Visit Diagnosis Plan: Essential (primary) hypertension

                   

Discussion: Has hydralazine to use prn per cardiology Going to do 30 day holter 
monitor

                                        ICD-9 : 401.9

ICD-10 : I10

                                                    2018                

 

                          Visit Diagnosis Plan: Hypoglycemia, unspecified       

            Discussion: 6 

small meals a day--each with protein Increase fluid intake

                                        ICD-9 : 251.2

ICD-10 : E16.2

                                                    2018                

 

                                        Visit Diagnosis Plan: Gastro-esophageal 

reflux disease without esophagitis      

                                        Discussion: Change to protonix 40mg po B

ID

Follow Up: 1 months

                                        ICD-9 : 530.81

ICD-10 : K21.9

                                                    2018                

 

                                        Appointment: Akanksha Driver

WPtel:[phone]

                                        [address] [place] [address]                                                           ACUTE ILLNESS      

 

                                        2018                

 

                          Patient Education: Patient Medication Summary         

                          

                                        Completed                   2018  

              

 

                          Visit Diagnosis Plan: Dizziness and giddiness         

          Discussion: 

blood work to be completed in office including cmp, cbc, troponin to rule out 
glucose intolerance, infection, dehydration. instructed patient that she needs 
to see her cardiologist sooner than oct and patient requested we contact dr. clements's office. will have front office make appt. patient has carotid doppler 
annually.

                                        ICD-9 : 780.4

ICD-10 : R42

                                                    2018                

 

                                        Appointment: Nat Lozoya

                                        [address] [place] [address]                                                           ACUTE ILLNESS      

 

                                        2018                

 

                          Patient Education: Patient Medication Summary         

                          

                                        Completed                   2018  

              

 

                          Visit Diagnosis Plan: Cervicalgia                   Di

scussion: Complete course 

of PT since symptoms improved Continue stretching exercises Notify if symptoms 
return or worsen

                                        ICD-9 : 723.1

ICD-10 : M54.2

                                                    2018                

 

                                        Appointment: Akanksha Driver

WPtel:[phone]

                                        [facility]                                                           FOLLOW UP          

 

                                        2018                

 

                          Patient Education: Patient Medication Summary         

                          

                                        Completed                   2018  

              

 

                          Visit Diagnosis Plan: Hypoglycemia, unspecified       

            Discussion: 

Eat something with protein every 2 hrs

                                        ICD-9 : 251.2

ICD-10 : E16.2

                                                    2018                

 

                                        Visit Diagnosis Plan: Other spondylosis 

with radiculopathy, cervical region     

                                        Discussion: Check MRI of cervical spine

                                        ICD-9 : 721.0

ICD-10 : M47.22

                                                    2018                

 

                          Visit Diagnosis Plan: Vertigo of central origin, bilat

eral                   

Discussion: Discussed PT for vestibular therapy

                                        ICD-9 : 386.2

ICD-10 : H81.43

                                                    2018                

 

                                        Appointment: Akanksha Driver

WPtel:[phone] [address] [facility]

                                                                             

2018                

 

                          Patient Education: Patient Medication Summary         

                          

                                        Completed                   2018  

              

 

                    Care Plan: MRI NECK SPINE W/O DYE                           

            LOINC : 

62970-0

                          Pending                   2018                

 

                          Visit Diagnosis Plan: Otalgia, bilateral              

     Discussion: kenalog 

40 mg given to patient im. instructed patient to monitor blood sugar at home due
to risk of increased sugar. instructed patient to rtc on wednesday if no 
improvement and may require antibiotic.

                                        ICD-9 : 388.70

ICD-10 : H92.03

                                                    2018                

 

                          Visit Diagnosis Plan: Benign paroxysmal vertigo, bilat

eral                   

Discussion: patient has meclizine at home but states it makes her too tired. 
instructed patient to cut in half and take at night. if it doesn't cause too 
much drowsiness, instructed to take bid until feeling better. instructed to rtc 
wed if no improvement or worsening symptoms. instructed patient to increase 
fluid intake as well.

                                        ICD-9 : 386.11

ICD-10 : H81.13

                                                    2018                

 

                                        Appointment: Nat Lozoya

                                        [address] [place], [place] [address]

                                                           ACUTE ILLNESS      

 

                                        2018                

 

                          Patient Education: Patient Medication Summary         

                          

                                        Completed                   2018  

              

 

                          Visit Diagnosis Plan: Left lower quadrant pain        

           Discussion: 

Culture urine Notify if worsens

                                        ICD-9 : 789.04

ICD-10 : R10.32

                                                    2018                

 

                          Visit Diagnosis Plan: Low back pain                   

Discussion: Stretches 

Topical aspercreme Tylenol 1 po TID

                                        ICD-9 : 724.2

ICD-10 : M54.5

                                                    2018                

 

                          Visit Diagnosis Plan: Radiculopathy, lumbosacral regio

n                   

Discussion: As above

                                        ICD-9 : 724.4

ICD-10 : M54.17

                                                    2018                

 

                                        Appointment: Akanksha Driver

WPtel:[phone]

                                        [address] [place] [address]                                                           ACUTE ILLNESS      

 

                                        2018                

 

                          Patient Education: Patient Medication Summary         

                          

                                        Completed                   2018  

              

 

                                        Appointment: Akanksha Driver

WPtel:[phone]

                                        [address] [place], [place] [id number]

[place]                                                           INJECTION          

 

                                        10/06/2017                

 

                          Patient Education: Patient Medication Summary         

                          

                                        Completed                   10/06/2017  

              

 

                                        Appointment: Akanksha Driver

WPtel:[phone]

                                        [address] [place] [address]                                                           LAB                

 

                                        2017                

 

                          Patient Education: Patient Medication Summary         

                          

                                        Completed                   2017  

              

 

                          Visit Diagnosis Plan: Pain in thoracic spine          

         Discussion: PT 

has helped Continue stretches and walking Discussed joining [facility] to 
continue exercise

                                        ICD-9 : 724.1

ICD-10 : M54.6

                                                    2017                

 

                                        Visit Diagnosis Plan: Other intervertebr

al disc degeneration, lumbar region     

                                        Follow Up: 3 months

                                        ICD-9 : 722.52

ICD-10 : M51.36

                                                    2017                

 

                                        Appointment: Akanksha Driver

WPtel:[phone]

                                        [address] [place] [address]                                                           FOLLOW UP          

 

                                        2017                

 

                          Patient Education: Patient Medication Summary         

                          

                                        Completed                   2017  

              

 

                          Visit Diagnosis Plan: Low back pain                   

Discussion: Start PT for 

low back- Seems like abdominal pain is radiating from low back

Follow Up: 5 weeks

                                        ICD-9 : 724.2

ICD-10 : M54.5

                                                    2017                

 

                          Visit Diagnosis Plan: Left lower quadrant pain        

           Discussion: Had

CT scan of abdomen/pelvis in 2017 and normal colonoscopy in 2015

                                        ICD-9 : 789.04

ICD-10 : R10.32

                                                    2017                

 

                                        Appointment: Akanksha Driver

WPtel:[phone]

                                        [address] [place], [place] [id number]                                                           ACUTE ILLNESS      

 

                                        2017                

 

                          Patient Education: Patient Medication Summary         

                          

                                        Completed                   2017  

              

 

                                        Appointment: Akanksha Driver

WPtel:[phone]

                                        [address] [place], [place] [id number]

[place]                                                           LAB                

 

                                        2017                

 

                          Patient Education: Patient Medication Summary         

                          

                                        Completed                   2017  

              

 

                          Visit Diagnosis Plan: Mixed hyperlipidemia            

       Discussion: Check 

lipids

                                        ICD-9 : 272.4

ICD-10 : E78.2

                                                    2017                

 

                          Visit Diagnosis Plan: Impaired fasting glucose        

           Discussion: 

Return in AM for CMP, HbA1C Accuchecks daily Diet/Exercise discussed at length

                                        ICD-9 : 790.29

ICD-10 : R73.01

                                                    2017                

 

                          Visit Diagnosis Plan: Other fatigue                   

Discussion: Check CBC, TSH

                                        ICD-9 : 780.79

ICD-10 : R53.83

                                                    2017                

 

                          Visit Diagnosis Plan: Mastodynia                   Dis

cussion: Check Bilateral 

diagnostic mammogram with US

                                        ICD-9 : 611.71

ICD-10 : N64.4

                                                    2017                

 

                                        Appointment: Akanksha Driver

WPtel:[phone]

                                        [address] [place] [address]

[place]                   3/7 confirmed `sl                                       

ACUTE ILLNESS                           2017                

 

                          Patient Education: Patient Medication Summary         

                          

                                        Completed                   2017  

              

 

                    Care Plan: MAMMOGRAM SCREENING                              

         [facility] : 

[id number]

                          Pending                   2017                

 

                                        Visit Diagnosis Plan: Acute upper respir

atory infection, unspecified            

                                        Discussion: Exam is reassuring Likely vi

ral illness Supportive care 

reviewed and encouraged Follow up PRN

                                        ICD-9 : 465.9

ICD-10 : J06.9

                                                    2017                

 

                                        Appointment: Luba Stevenson 

                                        [address] [place], [place] [id number]                                                           ACUTE ILLNESS      

 

                                        2017                

 

                          Patient Education: Patient Medication Summary         

                          

                                        Completed                   2017  

              

 

                          Visit Plan:                    Supportive care. Rest, 

Fluids, Tylenol/Motrin prn

fever or bodyaches. Notify if worsening symptoms.

                                                            2016          

  

   

 

                                        Appointment: Akanksha Driver

WPtel:[phone]

                                        [address] [place], [place] [id number]                                                           ACUTE ILLNESS      

 

                                        2016                

 

                          Patient Education: Patient Medication Summary         

                          

                                        Completed                   2016  

              

 

                                        Appointment: Akanksha Driver

WPtel:[phone]

                                        [address] [place], [place] [id number]

[place]                                                           INJECTION          

 

                                        10/07/2016                

 

                          Patient Education: Patient Medication Summary         

                          

                                        Completed                   10/07/2016  

              

 

                                        Appointment: Akanksha Driver

WPtel:[phone]

                                        [address] [place], [place] [id number]

[place]                                                           LAB                

 

                                        2016                

 

                          Patient Education: Patient Medication Summary         

                          

                                        Completed                   2016  

              

 

                          Visit Plan:                    Add miralax daily Use d

aily probiotic and 

metamucil and push fluids Notify if worsens/persists

                                                            2016          

  

   

 

                                        Appointment: Akanksha Driver

WPtel:[phone]

                                        [address] [place], [place] [id number]

                        16 confirmed ~sl                                 

     

                          ACUTE ILLNESS                   2016            

    

 

                          Patient Education: Patient Medication Summary         

                          

                                        Completed                   2016  

              

 

                          Visit Plan:                    No NSAIDs Kidney functi

on discussed Discussed 

hydration Monitor lab every 4months so will check CMP, HbA1C next month

                                                            2016          

  

   

 

                          Visit Plan:                    No NSAIDs Kidney functi

on discussed Discussed 

hydration Monitor lab every 4months so will check CMP, HbA1C next month

                                                            2016          

  

   

 

                                        Appointment: Akanksha Driver

WPtel:[phone]

                                        [address] [place], [place] [id number]                   03/15 confirmed ~sl                                       

ACUTE ILLNESS                           2016                

 

                          Patient Education: Patient Medication Summary         

                          

                                        Completed                   2016  

              

 

                          Visit Plan:                    Vestibular exercises Re

fill flonase Strict low Na

diet--discussed that needs to read labels Rx for walker given Has carotids and 
abdominal aorta and legs checked in January Check Chem 7

                                                            2015          

  

   

 

                          Visit Plan:                    Vestibular exercises Re

fill flonase Strict low Na

diet--discussed that needs to read labels Rx for walker with chair given due to 
muscle weakness/unsteadiness Has carotids and abdominal aorta and legs checked 
in January Check Chem 7

                                                            2015          

  

   

 

                          Visit Plan:                    Vestibular exercises Re

fill flonase Strict low Na

diet--discussed that needs to read labels Rx for walker given Has carotids and 
abdominal aorta and legs checked in January Check Chem 7

                                                            2015          

  

   

 

                          Visit Plan:                    Vestibular exercises Re

fill flonase Strict low Na

diet--discussed that needs to read labels Rx for walker with chair given due to 
muscle weakness/unsteadiness Has carotids and abdominal aorta and legs checked 
in January Check Chem 7

                                                            2015          

  

   

 

                                        Appointment: Akanksha Driver

WPtel:[phone]

                                        [address] [place], [place] [id number]

                        12/15/15 appt confirmed cn                            

     

                          FOLLOW UP                   2015                

 

                          Patient Education: Patient Medication Summary         

                          

                                        Completed                   2015  

              

 

                    Patient Education: Vertigo                                  

                     

                          Completed                   2015                

 

                                        Appointment: Akanksha Driver

WPtel:[phone]

                                        [address] [place], [place] [id number]

                                                           INJECTION          

 

                                        10/16/2015                

 

                          Patient Education: Patient Medication Summary         

                          

                                        Completed                   10/16/2015  

              

 

                                        Appointment: Akanksha Driver

WPtel:[phone]

                                        [address] [place], [place] [id number]                                                           UA                 

 

                                        09/15/2015                

 

                          Patient Education: Patient Medication Summary         

                          

                                        Completed                   09/15/2015  

              

 

                                        Referral: Landon De La Torre

WPtel:[phone]

#[facility]                   Referral                                       Completed   

                                        2015                

 

                                        Appointment: Akanksha Driver

WPtel:[phone]

                                        [address] [place] [address]                                                           LAB                

 

                                        09/10/2015                

 

                          Patient Education: Patient Medication Summary         

                          

                                        Completed                   09/10/2015  

              

 

                          Visit Plan:                    Check CMP, CBC, TSH, Fr

ee T4, B12, Lipids, HbA1C 

Discussed 6 small meals a day each with protein Would likely benefit from 
antidepressant Update colonoscopy

                                                            2015          

  

   

 

                                        Appointment: Akanksha Driver

WPtel:[phone]

                                        [address] [place], [place] [id number]

                   9/8/15 confirmed                                       

ACUTE ILLNESS                           2015                

 

                          Patient Education: Patient Medication Summary         

                          

                                        Completed                   2015  

              

 

                          Visit Plan:                    Cerumen flush with warm

 water and peroxide - good

results Resume Nasonex nasal spray daily Recommended Debrox earwax removal drops

                                                            2015          

  

   

 

                          Visit Plan:                    Cerumen flush with warm

 water and peroxide - good

results Resume Nasonex nasal spray daily Recommended Debrox earwax removal drops

                                                            2015          

  

   

 

                                        Appointment: Bertha Mon

WPtel:[phone]

                                        [address] [place], [place] [id number]                                                           ACUTE ILLNESS      

 

                                        2015                

 

                          Patient Education: Patient Medication Summary         

                          

                                        Completed                   2015  

              

 

                          Visit Plan:                    Continue aspirin daily 

Add meloxicam for 1week 

Elevate and Ice Call on 2015          

  

   

 

                                        Appointment: Akanksha Driver

WPtel:[phone]

                                        [address] [place] [address]                                                           ACUTE ILLNESS      

 

                                        2015                

 

                          Patient Education: Patient Medication Summary         

                          

                                        Completed                   2015  

              

 

                          Visit Plan:                    Been using baclofen at 

bedtime and has helped 

some PT for next 2-4weeks

                                                            2015          

  

   

 

                                        Appointment: Akanksha Driver

WPtel:[phone]

                                        [address] [place] [address]                                                           Hospital Follow Up 

 

                                        2015                

 

                          Patient Education: Patient Medication Summary         

                          

                                        Completed                   2015  

              

 

                                        Appointment: Akanksha Driver

WPtel:[phone]

                                        [address] [place] [address]                                                           LAB                

 

                                        2015                

 

                                        Appointment: Akanksha Driver

WPtel:[phone]

                                        [address] [place] [address]                        feeling better, weather -                           

     

                          FOLLOW UP                   2015                

 

                                        Referral: Landon De La Torre

WPtel:[phone]

[facility] [address]                   Referral                                       Appointment 

Requested                               2015                

 

                          Visit Plan:                    Continue exercise and c

urrent meds See surgery 

for removal of right arm lesion

                                                            2015          

  

   

 

                                        Appointment: Akanksha Driver

WPtel:[phone]

                                        [address] [place] [address]                                                           FOLLOW UP          

 

                                        2015                

 

                          Patient Education: Patient Medication Summary         

                          

                                        Completed                   2015  

              

 

                                        Appointment: Akanksha Driver

WPtel:[phone]

                                        [address] [place] [address]

[place]                                                           INJECTION          

 

                                        10/17/2014                

 

                          Patient Education: Patient Medication Summary         

                          

                                        Completed                   10/17/2014  

              

 

                          Visit Plan:                    Resume Claritin PO kathleen

y May take Ibuprofen PM at

night for sleep Continue Flonase 2 sprays each nostril bid Complete prednisone 
20 mg PO bid

                                                            2014          

  

   

 

                                        Appointment: Bertha Mon

WPtel:[phone]

                                        [address] [place], [place] [id number]                                                           ACUTE ILLNESS      

 

                                        2014                

 

                          Patient Education: Patient Medication Summary         

                          

                                        Completed                   2014  

              

 

                          Visit Plan:                    Discussed that likely l

umbar etiology for leg 

weakness Will change amlodopine to low dose dyazide and see if helps legs and 
inner ear

                                                            2014          

  

   

 

                                        Appointment: Akanksha Driver

WPtel:[phone]

                                        [address] [place] [address]                                                           FOLLOW UP          

 

                                        2014                

 

                          Patient Education: Patient Medication Summary         

                          

                                        Completed                   2014  

              

 

                          Visit Plan:                    Ceftin and continue cla

ritin/flonase Decrease 

amlodopine to 2.5mg q HS until fwup with Card due to weakness

                                                            2014          

  

   

 

                                        Appointment: Akanksha Driver

WPtel:[phone]

                                        [address] [place] [address]                                                           ACUTE ILLNESS      

 

                                        2014                

 

                          Patient Education: Patient Medication Summary         

                          

                                        Completed                   2014  

              

 

                                        Appointment: Akanksha Driver

WPtel:[phone]

                                        [address] [place], [place] [id number]

US                                                           LAB                

 

                                        2014                

 

                          Patient Education: Patient Medication Summary         

                          

                                        Completed                   2014  

              

 

                          Visit Plan:                    States she is having he

r carotids "done" this 

14 Return this week for fasting labs. CBC, CMP, TSH Free T4, 
Lipid Panel and HgbA1C.

                                                            2014          

  

   

 

                                        Appointment: Bertha Mon

WPtel:[phone]

                                        [address] [place], [place] [id number]

                                                           ACUTE ILLNESS      

 

                                        2014                

 

                          Patient Education: Patient Medication Summary         

                          

                                        Completed                   2014  

              

 

                          Visit Plan:                    Supportive care. Rest, 

Fluids, Tylenol prn fever 

or bodyaches. Notify if worsening symptoms. Ceftin

                                                            10/15/2013          

  

   

 

                                        Appointment: Bertha Mon

WPtel:[phone]

                                        [address] [place], [place] [id number]

                                                           ACUTE ILLNESS      

 

                                        10/15/2013                

 

                          Patient Education: Patient Medication Summary         

                          

                                        Completed                   10/15/2013  

              

 

                                        Appointment: Akanksha Driver

WPtel:[phone]

                                        [address] [place], [place] [id number]

US                                                           BP CHECK           

 

                                        2013                

 

                          Patient Education: Patient Medication Summary         

                          

                                        Completed                   2013  

              

 

                          Visit Plan:                    Continue increased dose

 of metoprolol Moniter BP 

at home BP check in 1wk Xanax refill to use prn

                                                            2013          

  

   

 

                          Visit Plan:                    Continue increased dose

 of metoprolol Moniter BP 

at home BP check in 1wk

                                                            2013          

  

   

 

                                        Appointment: Akanksha Driver

WPtel:[phone]

                                        [address] [place], [place] [id number]

                                                           ER Follow UP       

 

                                        2013                

 

                          Patient Education: Patient Medication Summary         

                          

                                        Completed                   2013  

              

 

                          Visit Plan:                    Restart flonase BID Use

 meclizine 25mg q HS 

Vestibular exercises Claritin 10mg q AM See ENT if doesn't resolve

                                                            2013          

  

   

 

                                        Appointment: Akanksha Driver

WPtel:[phone]

                                        [address] [place] [address]                                                           ACUTE ILLNESS      

 

                                        2013                

 

                          Patient Education: Patient Medication Summary         

                          

                                        Completed                   2013  

              

 

                          Visit Plan:                    Will continue to observ

e

                                                            2013          

  

   

 

                                        Appointment: Akanksha Driver

WPtel:[phone]

                                        [address] [place], [place] [id number]

                                                           FOLLOW UP          

 

                                        2013                

 

                          Patient Education: Patient Medication Summary         

                          

                                        Completed                   2013  

              

 

                          Visit Plan:                    ERx for Augmentin 875mg

 q12 x 10 days and Floxin 

otic drops 5 gtts AD x7days Sample of Nasonex per pt request Discussed treatment
(nasal saline, salt water gargles, keeping right ear clean and dry, for 
worsening go to UC on weekend, Tylenol/ibuprofen, etc.) RTC 2 weeks for ear 
recheck.

                                                            05/10/2013          

  

   

 

                                        Appointment: Jill Jean 

WPtel:[phone]

                                        [address] [place] [address]                                                           ACUTE ILLNESS      

 

                                        05/10/2013                

 

                          Patient Education: Patient Medication Summary         

                          

                                        Completed                   05/10/2013  

              

 

                          Visit Plan:                    Decrease caffeine intak

e Check Bilateral 

Mammogram with US of left breast

                                                            2013          

  

   

 

                                        Appointment: Akanksha Drivertepetey:[phone]

                                        [address] [place], [place] [id number]

                                                           ACUTE ILLNESS      

 

                                        2013                

 

                          Patient Education: Patient Medication Summary         

                          

                                        Completed                   2013  

              

 

                                        Appointment: Kate Hall

WPtel:[phone]

                                        [address] [place], [place] [id number]

                   appt scheduled                                        

ACUTE ILLNESS                           2013                

 

                                        Appointment: Akanksha Driver

WPtel:[phone]

                                        [address] [place], [place] [id number]

                                                           LAB                

 

                                        2012                

 

                          Patient Education: Patient Medication Summary         

                          

                                        Completed                   2012  

              

 

                          Visit Plan:                    Continue off carafate a

nd see how does Continue 

probiotic Add Vestibular exercises and use meclizine prn Continue Nasonex

                                                            2012          

  

   

 

                          Visit Plan:                    Continue off carafate a

nd see how does Continue 

probiotic Add Vestibular exercises and use meclizine prn Continue Nasonex Check 
fasting lab including CMP, Lipids, CBC, TSH, Free T4, HbA1C

                                                            2012          

  

   

 

                                        Appointment: Akanksha Driver

WPtel:[phone]

                                        [address] [place], [place] [id number]                                                           FOLLOW UP          

 

                                        2012                

 

                          Patient Education: Patient Medication Summary         

                          

                                        Completed                   2012  

              

 

                          Visit Plan:                    Continue carafate for 4

more weeks at current dose

then decrease to BID for 2wks then q HS

                                                            10/23/2012          

  

   

 

                                        Appointment: Akanksha Driver

WPtel:[phone]

                                        [address] [place], [place] [id number]

                                                           FOLLOW UP          

 

                                        10/23/2012                

 

                          Patient Education: Patient Medication Summary         

                          

                                        Completed                   10/23/2012  

              

 

                          Visit Plan:                    Continue omeprazole Add

 Carafate 1gm po q AC Add 

daily probiotic

                                                            10/10/2012          

  

   

 

                                        Appointment: Akanksha Driver

WPtel:[phone]

                                        [address] [place], [place] [id number]

                                                           ACUTE ILLNESS      

 

                                        10/10/2012                

 

                          Patient Education: Patient Medication Summary         

                          

                                        Completed                   10/10/2012  

              

 

                                        Appointment: Akanksha Driver

WPtel:[phone]

                                        [address] [place], [place] [id number]

US                                                           INJECTION          

 

                                        2012                

 

                          Patient Education: Patient Medication Summary         

                          

                                        Completed                   2012  

              

 

                          Visit Plan:                    Diabetic Diet Accucheck

s BID alternating times 

Hold onglyza and Januvia for now and continue diet and exercise and weight loss 
and drew LOREDO Discussed hypoglycemia and snack of peanut butter and crackers 
with juice or milk

                                                            2012          

  

   

 

                                        Appointment: Akanksha Drivertel:[phone]

                                        [address] [place], [place] [id number]

                                                           WORK IN            

 

                                        2012                

 

                          Patient Education: Patient Medication Summary         

                          

                                        Completed                   2012  

              

 

                                        Appointment: Akanksha Driver

WPtel:[phone]

                                        [address] [place], [place] [id number]

                                                           UA                 

 

                                        2012                

 

                          Patient Education: Patient Medication Summary         

                          

                                        Completed                   2012  

              

 

                                        Appointment: Akanksha Drivertel:[phone]

                                        [address] [place], [place] [id number]

                                                           LAB                

 

                                        2012                

 

                          Patient Education: Patient Medication Summary         

                          

                                        Completed                   2012  

              

 

                          Visit Plan:                    Fasting lab to check CM

P, Lipids, CBC, TSH, Free 

T4, HbA1C, Insulin level Hold Zocor for next 2wks

                                                            2012          

  

   

 

                                        Appointment: Akanksha Drivertel:[phone]

                                        [address] [place] [address]                                                           ACUTE ILLNESS      

 

                                        2012                

 

                          Patient Education: Patient Medication Summary         

                          

                                        Completed                   2012  

              

 

                          Visit Plan:                    Injection to Right SI j

oint as above Pt will call

in 3 days on pain Has PT starting in 2wks.

                                                            2012          

  

   

 

                                        Appointment: Akanksha Driver

WPtel:[phone]

                                        [address] [place], [place] [id number]

                                                           ACUTE ILLNESS      

 

                                        2012                

 

                          Patient Education: Patient Medication Summary         

                          

                                        Completed                   2012  

              

 

                          Visit Plan:                    OMT done Start PT May n

eed updated MRI if need to

consider epidural Prednisone

                                                            2012          

  

   

 

                                        Appointment: Akanksha Drivertel:[phone]

                                        [address] [place], [place] [id number]

                                                           OMT                

 

                                        2012                

 

                          Patient Education: Patient Medication Summary         

                          

                                        Completed                   2012  

              

 

                          Visit Plan:                    Flexeril and Vimovo OMT

 done Daily stretches

                                                            2012          

  

   

 

                                        Appointment: Akanksha Driver

WPtel:[phone]

                                        [address] [place] [address]                                                           ACUTE ILLNESS      

 

                                        2012                

 

                          Patient Education: Patient Medication Summary         

                          

                                        Completed                   2012  

              

 

                          Visit Plan:                    OMT done Daily stretche

s Moist heat or biofreeze 

Right SI joint injection Call in 1week Vimovo BID

                                                            2012          

  

   

 

                                        Appointment: Akanksha Driver

WPtel:[phone]

                                        [address] [place] [address]                                                           ACUTE ILLNESS      

 

                                        2012                

 

                          Patient Education: Patient Medication Summary         

                          

                                        Completed                   2012  

              

 

                                        Appointment: Akanksha Driver

WPtel:[phone]

                                        [address] [place] [address]                                                           LAB                

 

                                        2012                

 

                          Patient Education: Patient Medication Summary         

                          

                                        Completed                   2012  

              

 

                          Visit Plan:                    Decrease amlodopine to 

1.25mg daily Schedule with

Cardiology Fasting lab in AM

                                                            2012          

  

   

 

                                        Appointment: Akanksha Driver

WPtel:[phone]

                                        [address] [place] [address]                                                           ACUTE ILLNESS      

 

                                        2012                

 

                          Patient Education: Patient Medication Summary         

                          

                                        Completed                   2012  

              

 

                          Visit Plan:                    Saline nasal flushes pr

n. Tylenol/Motrin prn 

headache. Notify if persists/symptoms worsening. Cerumen removal from ears as 
above

                                                            2011          

  

   

 

                                        Appointment: Akanksha Driver

WPtel:[phone]

                                        [address] [place] [address]                                                           ACUTE ILLNESS      

 

                                        2011                

 

                          Patient Education: Patient Medication Summary         

                          

                                        Completed                   2011  

              

 

                                        Appointment: Akanksha Driver

WPtel:[phone]

                                        [address] [place] [address]

US                                                           INJECTION          

 

                                        10/05/2011                

 

                          Patient Education: Patient Medication Summary         

                          

                                        Completed                   10/05/2011  

              

 

                          Visit Plan:                    Continue vimovo for 1mo

re week Increase 

Omeprazole to BID for 1mo then resume QD if stomach improved Vestibular 
exercises with meclizine q HS for next week

                                                            2011          

  

   

 

                                        Appointment: Akanksha Driver

WPtel:[phone]

                                        [address] [place] [address]                                                           FOLLOW UP          

 

                                        2011                

 

                          Patient Education: Patient Medication Summary         

                          

                                        Completed                   2011  

              

 

                          Visit Plan:                    Trial of Vimovo 50/200m

g po BID Check UA

                                                            2011          

  

   

 

                                        Appointment: Akanksha Driver

WPtel:[phone]

                                        [address] [place] [address]                                                           ACUTE ILLNESS      

 

                                        2011                

 

                          Patient Education: Patient Medication Summary         

                          

                                        Completed                   2011  

              

 

                                        Appointment: Akanksha Driver

WPtel:[phone]

                                        [address] [place] [address]                                                           LAB                

 

                                        2011                

 

                          Patient Education: Patient Medication Summary         

                          

                                        Completed                   2011  

              

 

                          Visit Plan:                    Saline nasal flushes pr

n. Tylenol/Motrin prn 

headache. Notify if persists/symptoms worsening. Add Veramyst Increase 
Omeprazole to 20mg po BID

                                                            2011          

  

   

 

                                        Appointment: Akanksha Driver

WPtel:[phone]

                                        [address] [place] [address]                                                           ACUTE ILLNESS      

 

                                        2011                

 

                          Patient Education: Patient Medication Summary         

                          

                                        Completed                   2011  

              

 

                          Visit Plan:                    Injection to right SI j

oint as above Continue 

Vimovo Daily stretches Pt will call at end of week to let us know how back is 
doing

                                                            2011          

  

   

 

                                        Appointment: Akanksha Driver

WPtel:[phone]

                                        [address] [place] [address]                                                           FOLLOW UP          

 

                                        2011                

 

                          Patient Education: Patient Medication Summary         

                          

                                        Completed                   2011  

              

 

                          Visit Plan:                    Toradol 30mg IM now x1 

Vimovo 200/50 po BID 

Decrease Norvasc to 1/2 tablet daily Increase Lexaprol to 10mg QD

                                                            2011          

  

   

 

                                        Appointment: Akanksha Driver

WPtel:[phone]

                                        [address] [place] [address]                                                           ACUTE ILLNESS      

 

                                        2011                

 

                          Patient Education: Patient Medication Summary         

                          

                                        Completed                   2011  

              

 

                          Visit Plan:                    Nasocourt sample given.

 Pt. will notify if 

symptoms are worse on Monday.

                                                            2010          

  

   

 

                                        Appointment: Kate Hall

WPtel:[phone]

                                        [address] [place] [address]                                                           ACUTE ILLNESS      

 

                                        2010                

 

                          Patient Education: Patient Medication Summary         

                          

                                        Completed                   2010  

              

 

                                        Appointment: Akanksha Driver

WPtel:[phone]

                                        [address] [place] [address]                                                           INJECTION          

 

                                        10/06/2010                

 

                          Patient Education: Patient Medication Summary         

                          

                                        Completed                   10/06/2010  

              

 

                          Visit Plan:                    Cipro for UTI Cont [place]

pro at 5mg QD Flu shot 

next week

                                                            2010          

  

   

 

                                        Appointment: Akanksha Driver

WPtel:[phone]

                                        [address] [place] [address]                                                           FOLLOW UP          

 

                                        2010                

 

                          Patient Education: Patient Medication Summary         

                          

                                        Completed                   2010  

              

 

                    Patient Education: Lexapro                                  

                     

                          Completed                   2010                

 

                          Visit Plan:                    May proceed with hiatal

 hernia repair per Card. 

so will contact Dr. Cash's office to proceed with surgery

                                                            2010          

  

   

 

                          Visit Plan:                    May proceed with hiatal

 hernia repair per Card. 

so will contact Dr. Cash's office to proceed with surgery Trial of 
Lexapro 5mg QD plus use xanax prn

                                                            2010          

  

   

 

                                        Appointment: Akanksha Driver

WPtel:[phone]

                                        [address] [place] [address]                                                           FOLLOW UP          

 

                                        2010                

 

                          Patient Education: Patient Medication Summary         

                          

                                        Completed                   2010  

              

 

                          Visit Plan:                    B12 given Cont oral B12

 and iron Fwup 1mo for B12

Proceed with hiatal hernia repair once card clearance

                                                            2010          

  

   

 

                                        Appointment: Akanksha Driver

WPtel:[phone]

                                        [address] [place] [address]                                                           FOLLOW UP          

 

                                        2010                

 

                          Patient Education: Patient Medication Summary         

                          

                                        Completed                   2010  

              

 

                          Visit Plan:                    No caffeine, no nicotin

e, no mints, no late 

meals, elevate HOB 30 degrees Cont. with Nexium See Card to discuss Hiatal 
Hernia Repair B12 given

                                                            2010          

  

   

 

                                        Appointment: Akanksha Driver

WPtel:[phone] [address] [facility]

[place]                                                           FOLLOW UP          

 

                                        2010                

 

                          Patient Education: Patient Medication Summary         

                          

                                        Completed                   2010  

              

 

                                        Appointment: Akanksha Driver

WPtel:[phone] [address] [facility]

[place]                                                           LAB                

 

                                        2010                

 

                          Patient Education: Patient Medication Summary         

                          

                                        Completed                   2010  

              

 

                          Visit Plan:                    Check fasting lab in AM

--CMP,Lipids, CBC, Vit D, 

TSH,FreeT4, B12

                                                            2010          

  

   

 

                                        Appointment: Akanksha Driver

WPtel:[phone]

                                        [facility] [facility]

                                                           FOLLOW UP          

 

                                        2010                

 

                          Patient Education: Patient Medication Summary         

                          

                                        Completed                   2010  

              

 

                                        Referral: Landon De La Torre

WPtel:[phone]

[facility] [facility]

[place]                   Referral                                       Appointment 

Requested                                               

 

                                        Referral: Landon De La Torre

WPtel:[phone]

[facility] [facility]

                   Referral                                       Initiated   

                                                        



                                                                                
                                                                                
                                                                                
                                                                                
                                                                                
                                                                                
                                                                                
                                                                                
                                                                                
                                                                                
                                                                                
                                                                                
                                                                                
                                                                                
                                                                                
                                                                                
                                                                                
                                                                                
                                                                                
                                                                                
                                                                                
                                                                                
                                                                  



Instructions

                      



                                        Comment                

 

                                                            . ERx for Augmentin 

875mg q12 x 10 days and Floxin otic 

drops 5 gtts AD x7days

Sample of Nasonex per pt request

Discussed treatment (nasal saline, salt water gargles, keeping right ear clean 
and dry, for worsening go to  on weekend, Tylenol/ibuprofen, etc.)

RTC 2 weeks for ear recheck.                                  

 

                                                            Right SI joint clean

sed with alchohol and betadine and 

injected with 3cc 1% lidocaine with 40mg depomedrol and 40mg kenalog, tolerated 
well with no complications, neosporin and bandage applied. OMT done

Daily stretches

Moist heat or biofreeze

Right SI joint injection

Call in 1week

Vimovo BID                                  

 

                                                            . Continue omeprazol

e

Add Carafate 1gm po q AC

Add daily probiotic

                                  

 

                                                            . States she is havi

ng her carotids "done" this 14

Return this week for fasting labs.  CBC, CMP, TSH Free T4, Lipid Panel and 
HgbA1C.                                  

 

                                                            . B12 given

Cont oral B12 and iron

Fwup 1mo for B12

Proceed with hiatal hernia repair once card clearance                           
      

 

                                                            . Been using baclofe

n at bedtime and has helped some

PT for next 2-4weeks

                                  

 

                                                            . Check fasting lab 

in AM--CMP,Lipids, CBC, Vit D, 

TSH,FreeT4, B12                                  

 

                                                            . May proceed with h

iatal hernia repair per Card. so will 

contact Dr. Cash's office to proceed with surgery                        
         

 

                                                            . Continue increased

 dose of metoprolol

Moniter BP at home

BP check in 1wk

Xanax refill to use prn                                  

 

                                                            . Continue increased

 dose of metoprolol

Moniter BP at home

BP check in 1wk                                  

 

                                                            . Restart flonase BI

D

Use meclizine 25mg q HS

Vestibular exercises

Claritin 10mg q AM

See ENT if doesn't resolve                                  

 

                                                            . Fasting lab to judith

ck CMP, Lipids, CBC, TSH, Free T4, 

HbA1C, Insulin level

Hold Zocor for next 2wks                                  

 

                                                            . Resume Claritin PO

 daily

May take Ibuprofen PM at night for sleep

Continue Flonase 2 sprays each nostril bid

Complete prednisone 20 mg PO bid



                                  

 

                                                            . Add miralax daily

Use daily probiotic and metamucil and push fluids

Notify if worsens/persists                                  

 

                                                            . Continue aspirin d

aily

Add meloxicam for 1week

Elevate and Ice

Call on Monday                                  

 

                                                            . Discussed that lik

ely lumbar etiology for leg weakness

Will change amlodopine to low dose dyazide and see if helps legs and inner ear  
                               

 

                                                            . Cipro for UTI

Cont Lexapro at 5mg QD

Flu shot next week                                  

 

                                                            .  Saline nasal flus

hes prn. Tylenol/Motrin prn headache.  

Notify if persists/symptoms worsening.

Cerumen removal from ears as above

                                  

 

                                                            . Toradol 30mg IM no

w x1

Vimovo 200/50 po BID

Decrease Norvasc to 1/2 tablet daily

Increase Lexaprol to 10mg QD                                  

 

                                                            . Decrease amlodopin

e to 1.25mg daily

Schedule with Cardiology

Fasting lab in AM                                  

 

                                                            . Saline nasal flush

es prn. Tylenol/Motrin prn headache.  

Notify if persists/symptoms worsening.

Add Veramyst

Increase Omeprazole to 20mg po BID                                  

 

                                                            . Nasocourt sample g

iven.  Pt. will notify if symptoms are 

worse on Monday.                                   

 

                                                            . No NSAIDs

Kidney function discussed

Discussed hydration 

Monitor lab every 4months so will check CMP, HbA1C next month                   
              

 

                                                            . No NSAIDs

Kidney function discussed

Discussed hydration 

Monitor lab every 4months so will check CMP, HbA1C next month                   
              

 

                                                            . Vestibular exercis

es

Refill flonase

Strict low Na diet--discussed that needs to read labels

Rx for walker given

Has carotids and abdominal aorta and legs checked in January

Check Chem 7                                  

 

                                                            . Vestibular exercis

es

Refill flonase

Strict low Na diet--discussed that needs to read labels

Rx for walker with chair given due to muscle weakness/unsteadiness

Has carotids and abdominal aorta and legs checked in January

Check Chem 7

                                  

 

                                                            . Vestibular exercis

es

Refill flonase

Strict low Na diet--discussed that needs to read labels

Rx for walker given

Has carotids and abdominal aorta and legs checked in January

Check Chem 7                                  

 

                                                            . Vestibular exercis

es

Refill flonase

Strict low Na diet--discussed that needs to read labels

Rx for walker with chair given due to muscle weakness/unsteadiness

Has carotids and abdominal aorta and legs checked in January

Check Chem 7

                                  

 

                                                            . Will continue to o

bserve

                                  

 

                                                            . May proceed with h

iatal hernia repair per Card. so will 

contact Dr. Cash's office to proceed with surgery



Trial of Lexapro 5mg QD plus use xanax prn                                  

 

                                                            . Trial of Vimovo 50

/200mg po BID

Check UA                                  

 

                                                            . Flexeril and Vimov

o

OMT done

Daily stretches                                  

 

                                                            . OMT done

Start PT

May need updated MRI if need to consider epidural

Prednisone                                  

 

                                                            . Continue vimovo fo

r 1more week

Increase Omeprazole to BID for 1mo then resume QD if stomach improved

Vestibular exercises with meclizine q HS for next week                          
       

 

                                                            . Diabetic Diet

Accuchecks BID alternating times

Hold onglyza and Januvia for now and continue diet and exercise and weight loss 
and moniter BS

Discussed hypoglycemia and snack of peanut butter and crackers with juice or 
milk                                  

 

                                                            . Continue exercise 

and current meds

See surgery for removal of right arm lesion                                  

 

                                                            . Ceftin and continu

e claritin/flonase

Decrease amlodopine to 2.5mg q HS until fwup with Card due to weakness          
                       

 

                                                            . Continue off caraf

ate and see how does

Continue probiotic

Add Vestibular exercises and use meclizine prn

Continue Nasonex                                  

 

                                                            Informed Consent obt

ainded.  Right SI joint cleansed with 

alcohol and betadine and injected with 3cc 1%l lidocaine with 40mg depomedrol 
and 40mg kenalog, tolerated well with no complications, neosporin and bandage 
applied. Injection to Right SI joint as above

Pt will call in 3 days on pain

Has PT starting in 2wks.                                  

 

                                                            Left ear flushed wit

h warm water and peroxide with ear 

syringe and then last removed with currette--peroxide drops instilled.  
Tolerated well, no complications, TM intact.. Continue off carafate and see how 
does

Continue probiotic

Add Vestibular exercises and use meclizine prn

Continue Nasonex

Check fasting lab including CMP, Lipids, CBC, TSH, Free T4, HbA1C               
                  

 

                                                            . Supportive care.  

Rest, Fluids, Tylenol/Motrin prn fever 

or bodyaches.  Notify if worsening symptoms.

                                  

 

                                                            . Continue carafate 

for 4more weeks at current dose then 

decrease to BID for 2wks then q HS                                  

 

                                                            . Decrease caffeine 

intake

Check Bilateral Mammogram with US of left breast                                
 

 

                                                            . Injection to right

 SI joint as above

Continue Vimovo

Daily stretches

Pt will call at end of week to let us know how back is doing                    
             

 

                                                            . Cerumen flush with

 warm water and peroxide - good results 

Resume Nasonex nasal spray daily

Recommended Debrox earwax removal drops                                   

 

                                                            . Cerumen flush with

 warm water and peroxide - good results 

Resume Nasonex nasal spray daily

Recommended Debrox earwax removal drops                                   

 

                                                            . No caffeine, no ni

cotine, no mints, no late meals, elevate

HOB 30 degrees

Cont. with Nexium

See Card to discuss Hiatal Hernia Repair

B12 given                                  

 

                                                            .  Supportive care. 

 Rest, Fluids, Tylenol prn fever or 

bodyaches.  Notify if worsening symptoms.

Ceftin                                  

 

                                                            . Check CMP, CBC, TS

H, Free T4, B12, Lipids, HbA1C

Discussed 6 small meals a day each with protein

Would likely benefit from antidepressant 

Update colonoscopy

## 2020-02-19 NOTE — XMS REPORT
CCD document using C-CDA

                             Created on: 2020



Leilani Ramírez

External Reference #: [id number]

: 1940

Sex: Female



Demographics





                          Address                   [address] [place]  [address]

 

                          Home Phone                [phone]

 

                          Preferred Language        Unknown

 

                          Marital Status            

 

                          Evangelical Affiliation     Unknown

 

                          Race                      White

 

                          Ethnic Group              Not  or 





Author





                          Author                    Leilani Driver D.O.

 

                          Organization              [facility]

 

                          Address                   [address] [place]  [address]



 

                          Phone                     [phone]







Care Team Providers





                    Care Team Member Name Role                Phone

 

                    Akanksha Driver D.O. PP                  Unavailable

 

                          CCM                       Unavailable







Summary Purpose

Interface Exchange



Insurance Providers





             Payer name   Policy type / Coverage type Covered party ID Effective

 Begin Date 

Effective End Date

 

             [employer] [place] B [id number]  2018   

  Unknown

 

             [employer] Senior Supplemental Medicare Part B [id number]   [id number]    

 Unknown







Family history





Father



                          Diagnosis                 Age At Onset

 

                          Heart disease             Unknown







Mother



                          Diagnosis                 Age At Onset

 

                          Heart disease             Unknown

 

                          Cancer                    Unknown







Social History





                Social History Element Codes           Description     Effective

 Dates

 

                Tobacco history SNOMED CT: 359109970 Never smoker    2011

 

                Marital status  Unknown         Single          2010

 

                Number of children Unknown         9               2010







Allergies, Adverse Reactions, Alerts





           Substance  Reaction   Codes      Entered Date Inactivated Date Status

 

           _                     Unknown    2019 No Inactive Date Active

 

           * NO KNOWN FOOD ALLERGIES            Unknown    2010 No Inactiv

e Date Active

 

           _                     Unknown    2010 No Inactive Date Active

 

           QUINIDINE-QUININE ANALOGUES hives,     Unknown    2010 No Inact

diamante Date Active







Problems





                Condition       Codes           Effective Dates Condition Status

 

                          Essential (primary) hypertension ICD-9: 401.9

ICD-10: I10               2014                Active

 

                          Mixed hyperlipidemia      ICD-9: 272.4

ICD-10: E78.2             2019                Active

 

                          FLU VACCINE               ICD-9: V04.81

ICD-10: Z23               2012                Active

 

                          Acute serous otitis media, left ear ICD-9: 381.01

ICD-10: H65.02            2019                Active

 

                          Coronary atherosclerosis due to calcified coronary les

ion ICD-9: 414.00

ICD-10: I25.84            2018                Active

 

                          Hypoglycemia, unspecified ICD-9: 251.2

ICD-10: E16.2             2017                Active

 

                          Other fatigue             ICD-9: 780.79

ICD-10: R53.83            2017                Active

 

                          Urinary tract infection, site not specified ICD-9: 599

.0

ICD-10: N39.0             10/02/2018                Active

 

                          Gastro-esophageal reflux disease without esophagitis I

CD-9: 530.81

ICD-10: K21.9             2018                Active

 

                          Epigastric pain           ICD-9: 789.06

ICD-10: R10.13            2018                Active

 

                          Atherosclerotic heart disease of native coronary arter

y without angina pectoris 

ICD-9: 414.00

ICD-10: I25.10            2018                Active

 

                          Generalized anxiety disorder ICD-9: 300.00

ICD-10: F41.1             2018                Active

 

                          Palpitations              ICD-9: 785.1

ICD-10: R00.2             10/02/2018                Active

 

                          Dizziness and giddiness   ICD-9: 780.4

ICD-10: R42               2014                Active

 

                          Occlusion and stenosis of bilateral carotid arteries I

CD-9: 433.10

ICD-10: I65.23            2018                Active

 

                          Cervicalgia               ICD-9: 723.1

ICD-10: M54.2             2018                Active

 

                          Other spondylosis with radiculopathy, cervical region 

ICD-9: 721.0

ICD-10: M47.22            2018                Active

 

                          Cervicocranial syndrome   ICD-9: 723.2

ICD-10: M53.0             2018                Active

 

                          Vertigo of central origin, bilateral ICD-9: 386.2

ICD-10: H81.43            2018                Active

 

                          Benign paroxysmal vertigo, bilateral ICD-9: 386.11

ICD-10: H81.13            2018                Active

 

                          Otalgia, bilateral        ICD-9: 388.70

ICD-10: H92.03            2018                Active

 

                          Left lower quadrant pain  ICD-9: 789.04

ICD-10: R10.32            2017                Active

 

                          Low back pain             ICD-9: 724.2

ICD-10: M54.5             2017                Active

 

                          Radiculopathy, lumbosacral region ICD-9: 724.4

ICD-10: M54.17            2018                Active

 

                          Impaired fasting glucose  ICD-9: 790.29

ICD-10: R73.01            2012                Active

 

                          Other intervertebral disc degeneration, lumbar region 

ICD-9: 722.52

ICD-10: M51.36            2017                Active

 

                          Pain in thoracic spine    ICD-9: 724.1

ICD-10: M54.6             2017                Active

 

                          Mastodynia                ICD-9: 611.71

ICD-10: N64.4             2017                Active

 

                          Acute upper respiratory infection, unspecified ICD-9: 

465.9

ICD-10: J06.9             2017                Active

 

                          Acute mastoiditis without complications, right ear ICD

-9: 383.00

ICD-10: H70.001           2016                Active

 

                          Constipation, unspecified ICD-9: 564.00

ICD-10: K59.00            2016                Active

 

                          Chronic kidney disease, stage 1 ICD-9: 585.1

ICD-10: N18.1             03/15/2016                Active

 

                          History of falling        ICD-9: V15.88

ICD-10: Z91.81            12/15/2015                Active

 

                          Muscle weakness (generalized) ICD-9: 780.79

ICD-10: M62.81            2015                Active

 

                Acute renal failure ICD-9: 584.9    2015      Active

 

                POLYURIA        ICD-9: 788.42   2015      Active

 

                CAD             ICD-9: 414.00   09/10/2015      Active

 

                Hyperglycemia   ICD-9: 790.29   2012      Active

 

                HYPERLIPIDEMIA NEC/NOS ICD-9: 272.4    09/10/2015      Active

 

                ABDOMINAL PAIN  ICD-9: 789.00   10/10/2012      Active

 

                Grieving        ICD-9: 309.0    2015      Active

 

                HYPOGLYCEMIA    ICD-9: 251.2    2012      Active

 

                MALAISE AND FATIGUE ICD-9: 780.79   2015      Active

 

                CERUMEN IMPACTION ICD-9: 380.4    2015      Active

 

                Leg pain        ICD-9: 729.5    2015      Active

 

                SUPERFIC PHLEBITIS-LEG ICD-9: 451.0    2015      Active

 

                SPASM OF MUSCLE ICD-9: 728.85   2015      Active

 

                Thoracic back pain ICD-9: 724.1    2015      Active

 

                Benign positional vertigo ICD-9: 386.11   2015      Active

 

                Suspicious nevus ICD-9: 238.2    2015      Active

 

                EUSTACHIAN TUBE DYSFUNCTION ICD-9: 381.81   2014      Acti

ve

 

                SINUSITIS, ACUTE ICD-9: 461.9    2014      Active

 

                DIZZINESS/VERTIGO ICD-9: 780.4    2014      Active

 

                HYPERTENSION    ICD-9: 401.9    2014      Active

 

                URI, ACUTE      ICD-9: 465.9    10/15/2013      Active

 

                CEPHALGIA       ICD-9: 784.0    2013      Active

 

                OTITIS MEDIA NOS ICD-9: 382.9    2013      Active

 

                Perforation of ear drum ICD-9: 384.20   05/10/2013      Active

 

                Mastalgia       ICD-9: 611.71   2013      Active

 

                DYSPEPSIA       ICD-9: 536.8    10/10/2012      Active

 

                IBS             ICD-9: 564.1    10/10/2012      Active

 

                FLU VACCINE     ICD-9: V04.81   2012      Active

 

                Radiculopathy of leg ICD-9: 724.4    2012      Active

 

                SCIATICA        ICD-9: 724.3    2012      Active

 

                Lumbar degenerative disc disease ICD-9: 722.52   2012     

 Active

 

                Sacroiliac dysfunction ICD-9: 739.4    2012      Active

 

                Hypertension    Unknown         2012      Active

 

                PAIN, LOWER BACK ICD-9: 724.2    2011      Active

 

                SPINAL ENTHESOPATHY ICD-9: 720.1    2011      Active

 

                Hypotension     ICD-9: 458.9    2011      Active

 

                SACROILIITIS NEC ICD-9: 720.2    2011      Active

 

                PHARYNGITIS, ACUTE ICD-9: 462      2010      Active

 

                URINARY TRACT INFECTION ICD-9: 599.0    2010      Active

 

                Heat exhaustion ICD-9: 992.5    2010      Active

 

                Esophagitis     ICD-9: 530.10   2010      Active

 

                Gastritis       ICD-9: 535.50   2010      Active

 

                GERD            ICD-9: 530.81   2010      Active

 

                Hiatal hernia   ICD-9: 553.3    2010      Active

 

                B12 DEFIC ANEMIA NEC ICD-9: 281.1    2010      Active

 

                Anxiety         Unknown         2010      Active

 

                Heart disease   Unknown         2010      Active

 

                Hyperlipidemia  Unknown         2010      Active

 

                ANXIETY STATE NOS ICD-9: 300.00   2010      Active

 

                DEPRESSIVE DISORDER NEC ICD-9: 311      2010      Active







Medications





          Medication Codes     Instructions Start Date Stop Date Status    Fill 

Instructions

 

           simvastatin 40 mg tablet RxNorm: 636019 1 Tablet(s) PO QD 2019 

02/15/2020 

Active                                   

 

                omeprazole 40 mg capsule,delayed release RxNorm: 2003 Capsu

le(s) Oral QD 

2019          Active               

 

                Xanax 0.25 mg tablet RxNorm: 820028  TAKE 1 TABLET BY MOUTH TWIC

E DAILY 

10/29/2019          No Stop Date        Active               

 

                omeprazole 40 mg capsule,delayed release RxNorm: 2003 Capsu

le(s) PO QD 

10/07/2019          2019          Inactive             

 

             metoprolol tartrate 25 mg tablet RxNorm: 885701 1 Tablet(s) PO BID 

2019                Active                     

 

                omeprazole 40 mg capsule,delayed release RxNorm: 020981   Capsu

le(s) PO QD 

2019          10/06/2019          Inactive             

 

                    Flonase Allergy Relief 50 mcg/actuation nasal spray,suspensi

on RxNorm: 1059868     2

[place] [facility] QHS 2019      No Stop Date    Active           

 

           simvastatin 40 mg tablet RxNorm: 970837 1 Tablet(s) PO QD 2019 

Inactive                                 

 

           simvastatin 40 mg tablet RxNorm: 901899 1 Tablet(s) PO QD 2019 

Inactive                                 

 

                omeprazole 40 mg capsule,delayed release RxNorm: 396567   Capsu

le(s) PO QD 

2019          Inactive             

 

           simvastatin 40 mg tablet RxNorm: 057453 1 Tablet(s) PO QD 2019 

Inactive                                 

 

                omeprazole 40 mg capsule,delayed release RxNorm: 866874  1 Capsu

le(s) PO QD 

2019          Inactive             

 

             Bactrim  mg-160 mg tablet RxNorm: 699288 1 Tablet(s) PO BID 0

3/08/2019   

2019                Inactive                   

 

             Bactrim  mg-160 mg tablet RxNorm: 565842 1 Tablet(s) PO BID 0

3/08/2019   

2019                Inactive                   

 

             Macrobid 100 mg capsule RxNorm: 735866 1 Capsule(s) PO BID 20

                          Inactive                   

 

             metoprolol tartrate 25 mg tablet RxNorm: 840671 1 Tablet(s) PO BID 

2019                Inactive                   

 

                Xanax 0.25 mg tablet RxNorm: 061815  TAKE 1 TABLET BY MOUTH TWIC

E DAILY 

2018          10/28/2019          Inactive             

 

           Xanax 0.25 mg tablet RxNorm: 371330 1 Tablet(s) PO BID 2018 

Inactive                                 

 

                    Protonix 40 mg tablet,delayed release RxNorm: 125841      1 

Tablet(s) PO BID for 

stomach--replaces omeprazole 2018      Inactive         

 

             Carafate 1 gram tablet RxNorm: 160582 1 Tablet(s) PO AC & HS 2019                Inactive                   

 

           Xanax 0.25 mg tablet RxNorm: 276155 1 Tablet(s) PO BID 10/30/2018 12/

10/2018 

Inactive                                 

 

             Bactrim  mg-160 mg tablet RxNorm: 889705 1 Tablet(s) PO BID 1

   

10/08/2018                Inactive                   

 

             Bactrim  mg-160 mg tablet RxNorm: 472549 1 Tablet(s) PO BID 1

   

10/03/2018                Inactive                   

 

             Carafate 1 gram tablet RxNorm: 482908 1 Tablet(s) PO AC & HS 09/18/

2018   

10/17/2018                Inactive                   

 

                    Protonix 40 mg tablet,delayed release RxNorm: 843290      1 

Tablet(s) PO BID for 

stomach--replaces omeprazole 2018      Inactive         

 

                    Protonix 40 mg tablet,delayed release RxNorm: 952084      1 

Tablet(s) PO BID for 

stomach--replaces omeprazole 2018      Inactive         

 

                    fluticasone 50 mcg/actuation nasal spray,suspension RxNorm: 

6958692     2 Spray 

NASAL QD to each nostril 2018      Inactive         

 

             Nasonex 50 mcg/actuation Spray RxNorm: 7733969 2 [place] [facility] 2018                Inactive                   

 

                omeprazole 40 mg capsule,delayed release RxNorm: 887428  1 Capsu

le(s) PO QD 

2018          08/15/2018          Inactive             

 

                    simvastatin 40 mg tablet RxNorm: 736536      1 Tablet(s) PO 

QD TAKE 1 TABLET EVERY 

DAY             2018      Inactive         

 

             metoprolol tartrate 25 mg tablet RxNorm: 664030 1/2 Tablet(s) PO QD

 2018                Inactive                   

 

                simvastatin 40 mg tablet RxNorm: 033534  Tablet(s) TAKE 1 TABLET

 EVERY DAY 

2017          Inactive             

 

             metoprolol tartrate 25 mg tablet RxNorm: 431508 1/2 Tablet(s) PO QD

 2017                Inactive                   

 

                    Flonase 50 mcg/actuation nasal spray,suspension RxNorm: 1797

933     2 [place] [facility] 

BID             2017      Inactive         

 

                omeprazole 40 mg capsule,delayed release RxNorm: 430270  1 Capsu

le(s) PO QD 

2017          Inactive             

 

             metoprolol tartrate 25 mg tablet RxNorm: 552980 1/2 Tablet(s) PO QD

 04/10/2017   

2017                Inactive                   

 

                    ciprofloxacin 0.2 % ear drops in a dropperette RxNorm: 16029

6      4 Drop(s) OTIC TID

for 1 week      2016      Inactive         

 

           cefdinir 300 mg capsule RxNorm: 586359 2 Capsule(s) PO QD 2016 

Inactive                                 

 

                    omeprazole 40 mg capsule,delayed release RxNorm: 918365     

 TAKE 1 CAPSULE EVERY DAY

                2016      Inactive         

 

             simvastatin 40 mg tablet RxNorm: 008083 TAKE 1 TABLET EVERY DAY 2017                Inactive                   

 

                    Flonase 50 mcg/actuation nasal spray,suspension RxNorm: 1797

933     2 [place] [facility] 

BID             2015      Inactive         

 

             cefuroxime axetil 250 mg tablet RxNorm: 093116 1 Tablet(s) PO BID 0

2015                Inactive                   

 

             cefuroxime axetil 250 mg tablet RxNorm: 646022 1 Tablet(s) PO BID 0

2015                Inactive                   

 

                omeprazole 40 mg capsule,delayed release RxNorm: 561927  1 Capsu

le(s) PO QD 

2015          Inactive             

 

           meloxicam 7.5 mg tablet RxNorm: 340481 1 Tablet(s) PO QD 2015 0

2015 

Inactive                                 

 

                loratadine 10 mg tablet RxNorm: 626707  1 Tablet(s) PO QAM for a

llergies 

2014          Inactive             

 

                    Flonase 50 mcg/actuation nasal spray,suspension RxNorm: 8963

23      2 [place] [facility] BID

                2014      12/15/2015      Inactive         

 

           prednisone 20 mg tablet RxNorm: 715360 2 Tablet(s) PO BID 2014 

Inactive                                 

 

             Dyazide 37.5 mg-25 mg capsule RxNorm: 200759 1 Capsule(s) PO QAM 2014                Inactive                   

 

           Ceftin 500 mg tablet RxNorm: 182706 1 Tablet(s) PO BID 2014 

Inactive                                 

 

           Ceftin 500 mg tablet RxNorm: 773011 1 Tablet(s) PO BID 2014 

Inactive                                 

 

                    simvastatin 40 mg tablet RxNorm: 062580      Tablet(s) PO TA

KE ONE TABLET BY MOUTH 

EVERY DAY       2014      Inactive         

 

                    metoprolol tartrate 25 mg tablet RxNorm: 712178      Tablet(

s) PO TAKE ONE-HALF 

TABLET BY MOUTH EVERY DAY 2014      Inactive         

 

           Ceftin 500 mg tablet RxNorm: 063862 1 Tablet(s) PO BID 10/15/2013 10/

 

Inactive                                 

 

                loratadine 10 mg tablet RxNorm: 044097  1 Tablet(s) PO QAM for a

llergies 

2013          Inactive             

 

                    omeprazole 20 mg capsule,delayed release RxNorm: 141946     

 Capsule(s) PO TAKE ONE 

CAPSULE BY MOUTH TWICE DAILY 2013      Inactive         

 

                omeprazole 20 mg capsule,delayed release RxNorm: 037352  1 Capsu

le(s) PO BID 

2013          Inactive             

 

             Augmentin 875 mg-125 mg tablet RxNorm: 941814 1 Tablet(s) PO Q12H 0

5/10/2013   

2013                Inactive                   

 

             Floxin Otic Drops 1 bottle Drops RxNorm:      5 Drop(s) OTIC BID 05

/10/2013   

2013                Inactive                   

 

                    metoprolol tartrate 25 mg tablet RxNorm: 271895      Tablet(

s) PO TAKE ONE-HALF 

TABLET BY MOUTH EVERY DAY 2013      Inactive         

 

                    simvastatin 40 mg tablet RxNorm: 507037      Tablet(s) PO TA

KE ONE TABLET BY MOUTH 

EVERY DAY       2013      Inactive         

 

                lancets         RxNorm:         Misc Miscellaneous USE ONE TO CH

YOGI GLUCOSE EVERY DAY 

2013          Inactive             

 

             Carafate 1 gram tablet RxNorm: 728826 1 Tablet(s) PO AC & HS 2015                Inactive                   

 

           Flagyl 500 mg tablet RxNorm: 534114 1 Tablet(s) PO TID 10/10/2012 10/

 

Inactive                                 

 

             Carafate 1 gram tablet RxNorm: 231390 1 Tablet(s) PO AC & HS 10/10/

2012   

2012                Inactive                   

 

          One Touch Test strips RxNorm:   Miscellaneous QD 2012

 Inactive  

one touch ultra mini test strips

 

           Protonix 40 mg Tab RxNorm: 096370 1 Tablet(s) PO QD 2012 

Inactive                                 

 

           Protonix 40 mg Tab RxNorm: 407759 1 Tablet(s) PO QD 2012 10/09/

2012 

Inactive                                 

 

           prednisone 20 mg Tab RxNorm: 469342 1 Tablet(s) PO BID 2012 

Inactive                                 

 

             Flexeril 5 mg Tab RxNorm: 462494 1 Tablet(s) PO QHS for spasm 2012                Inactive                   

 

             Flexeril 5 mg Tab RxNorm: 954110 1 Tablet(s) PO QHS for spasm 2012                Inactive                   

 

                amlodipine 2.5 mg Tab RxNorm: 828309  1/2 Tablet(s) PO QD replac

es 5mg dose 

2012          Inactive             

 

           simvastatin 40 mg tablet RxNorm: 536261 1 Tablet(s) PO QD 2012 

Inactive                                 

 

             metoprolol tartrate 25 mg tablet RxNorm: 909060 1/2 Tablet(s) PO QD

 2012                Inactive                   

 

                omeprazole 20 mg capsule,delayed release RxNorm: 889392  1 Capsu

le(s) PO BID 

2012          Inactive             

 

           cefdinir 300 mg Cap RxNorm: 257779 2 Capsule(s) PO QD 2011 

Inactive                                 

 

           simvastatin 40 mg Tab RxNorm: 134681 1 Tablet(s) PO QD 2011 

Inactive                                 

 

             metoprolol tartrate 25 mg Tab RxNorm: 133915 1/2 Tablet(s) PO QD 10

/   

2012                Inactive                   

 

             metoprolol tartrate 25 mg Tab RxNorm: 253887 1/2 Tablet(s) PO QD 10

/   

10/24/2011                Inactive                   

 

           meclizine 25 mg Tab RxNorm: 8807830 1 Tablet(s) PO QHS 2011 

Inactive                                for dizziness

 

           Ceftin 500 mg Tab RxNorm: 202065 1 Tablet(s) PO BID 2011 

Inactive                                 

 

                omeprazole 20 mg Cap, Delayed Release RxNorm: 418693  1 Capsule(

s) PO BID 

2011          10/24/2011          Inactive             

 

           simvastatin 40 mg Tab RxNorm: 755026 1 Tablet(s) PO QD 2011 

Inactive                                 

 

           simvastatin 40 mg Tab RxNorm: 762637 1 Tablet(s) PO QD 2011 

Inactive                                 

 

           simvastatin 40 mg Tab RxNorm: 484943 1 Tablet(s) PO QD 2010 

Inactive                                 

 

             Tessalon Perles 100 mg Cap RxNorm: 556229 1 Capsule(s) PO Q6-8H 2010                Inactive                   

 

           cefdinir 300 mg Cap RxNorm: 059291 1 Capsule(s) PO BID 2010 

Inactive                                 

 

           Lexapro 10 mg Tab RxNorm: 953911 1 Tablet(s) PO QD 2010

010 

Inactive                                 

 

           Cipro 250 mg Tab RxNorm: 874593 1 Tablet(s) PO BID 2010 10/04/2

010 

Inactive                                 

 

             Wellbutrin  mg 24 hr Tab RxNorm: 115152 1 Tablet(s) PO QAM 2010                Inactive                   

 

          Fish Oil 1,000 mg Cap RxNorm:   1 Capsule(s) PO QD No Start Date      

     Active     

 

                Tylenol Extra Strength 500 mg tablet RxNorm: 443380  1/2 Tablet(

s) PO as needed 

No Start Date                           Active               

 

                nitroglycerin 0.4 mg sublingual tablet RxNorm: 836256  Tablet(s)

 SL as needed No 

Start Date                              Active               

 

                    Calcium with Vitamin D 600 mg (1,500 mg)-400 unit tablet RxN

orm: 225225      1 

Tablet(s) PO QD No Start Date                   Active           

 

           Multivitamin & Mineral Formula Tab RxNorm:    1 Tablet(s) PO QD No St

art Date            

Active                                   

 

          vitamin B complex capsule RxNorm:   1 Capsule(s) PO QD No Start Date  

         Active     

 

          Aspirin 81 mg Tab RxNorm: 535299 1 Tablet(s) PO QD No Start Date      

     Active     

 

                    isosorbide mononitrate ER 30 mg tablet,extended release 24 h

r RxNorm: 864111      1 

Tablet(s) PO QHS No Start Date                   Active           

 

           Vitamin D 1,000 unit Cap RxNorm: 841047 1 Capsule(s) PO QD No Start D

ate            

Active                                   

 

          Co Q-10 oral RxNorm: 12076 oral      No Start Date           Active   

  

 

             metoprolol tartrate 25 mg Tab RxNorm: 451198 1/2 Tablet(s) PO QD No

 Start Date 

10/23/2011                Inactive                   

 

             Nasonex 50 mcg/actuation Spray RxNorm: 5635920 2 Spray NASAL No Sta

rt Date 

2018                Inactive                   

 

           Vitamin B12 1000mcg Tablet RxNorm:    1 Tablet(s) PO QD No Start Date

 2015 

Inactive                                 

 

           amlodipine 5 mg Tab RxNorm: 968646 1 Tablet(s) PO QD No Start Date  

Inactive                                 

 

             simvastatin 40 mg tablet RxNorm: 294115 1 Tablet(s) PO QD No Start 

Date 

2019                Inactive                   

 

                omeprazole 20 mg capsule,delayed release RxNorm: 224921  1 Capsu

le(s) PO BID No 

Start Date          2013          Inactive             

 

                omeprazole 40 mg capsule,delayed release RxNorm: 718648  1 Capsu

le(s) PO QD No 

Start Date          2019          Inactive             

 

           Nexium 40 mg Cap RxNorm: 133712 1 Capsule(s) PO QD No Start Date  

Inactive                                 

 

             Stool Softener 100 mg Tab RxNorm: 1745521 2 Tablet(s) PO BID No Sta

rt Date 

2012                Inactive                   

 

                    Calcium with Vitamin D 600 mg (1,500 mg)-400 unit Tab RxNorm

: 035868      1 Tablet(s)

PO QD           No Start Date   2015      Inactive         

 

             iron 325 mg (65 mg iron) Tab RxNorm: 711095 1 Tablet(s) PO QD No St

art Date 

2015                Inactive                   

 

                Flonase 50 mcg/actuation Nasal Spray RxNorm: 417052  2 [place] ROZ

AL BID No Start 

Date                2014          Inactive             

 

                    fluticasone 50 mcg/actuation nasal spray,suspension RxNorm: 

6008881     2 Spray 

NASAL QD to each nostril No Start Date   2018      Inactive         

 

             Nasonex 50 mcg/actuation Spray RxNorm: 7866494 2 Spray NASAL QD No 

Start Date 

2018                Inactive                   

 

                    hydralazine 50 mg tablet RxNorm: 902351      1 Tablet(s) PO 

as needed for BP over 

160/90          No Start Date   2018      Inactive         

 

             Vimovo 500 mg-20 mg 12 hr Tab RxNorm: 236818 1 Tablet(s) PO BID No 

Start Date 

2011                Inactive                   

 

             Tylenol PM 25 mg-500 mg/15 mL Oral Soln RxNorm: 2403795 1 PO QPM   

  No Start Date 

2015                Inactive                   

 

          Iron (Ferrous Sulfate) Oral RxNorm:   Oral      No Start Date 20

12 Inactive   

 

             Reglan 10 mg Tab RxNorm: 453735 1 Tablet(s) PO TID before meals No 

Start Date 

2011                Inactive                   

 

           Xanax 1 mg Tab RxNorm: 530294 1/2 Tablet(s) PO QD No Start Date  

Inactive                                 

 

             amlodipine 10 mg tablet RxNorm: 644603 1 Tablet(s) PO QD No Start D

ate 

2014                Inactive                   

 

          Iron (dried) Oral RxNorm:   Oral      No Start Date 2012 Inactiv

e   

 

                metoprolol tartrate 50 mg tablet RxNorm: 291388  1 Tablet(s) PO 

BID No Start Date

                    12/10/2018          Inactive             

 

           Xanax 0.25 mg tablet RxNorm: 503283 1 Tablet(s) PO BID No Start Date 

10/29/2018 

Inactive                                 

 

                lancets         RxNorm:         Miscellaneous check blood sugar 

at least once daily No Start 

Date                2013          Inactive             

 

           simvastatin 40 mg Tab RxNorm: 455045 1 Tablet(s) PO QD No Start Date 

2010 

Inactive                                 

 

                    isosorbide mononitrate ER 30 mg tablet,extended release 24 h

r RxNorm: 876333      1 

Tablet(s) PO QHS No Start Date   2019      Inactive         

 

             amlodipine 2.5 mg tablet RxNorm: 865321 1 Tablet(s) PO QD No Start 

Date 

2014                Inactive                   

 

             sucralfate 1 gram tablet RxNorm: 276648 1 Tablet(s) PO QID No Start

 Date 

2019                Inactive                   

 

          Fish Oil Oral RxNorm:   Oral      No Start Date 2012 Inactive   

 

          Multiple Vitamin Oral RxNorm:   Oral      No Start Date 2012 [place]

ctive   

 

                metoprolol tartrate 25 mg tablet RxNorm: 786077  1/2 Tablet(s) P

O QD No Start 

Date                2017          Inactive             

 

                metoprolol tartrate 50 mg tablet RxNorm: 951391  1/2 Tablet(s) P

O BID No Start 

Date                2019          Inactive             

 

                    Flonase Allergy Relief 50 mcg/actuation nasal spray,suspensi

on RxNorm: 6716990     2

[place] NASAL QHS No Start Date   2019      Inactive         







Medication Administered

No Medication Administered data



Immunizations





                Vaccine         Codes           Date            Status

 

                Influenza       CVX: 135        10/22/2019      Complete

 

                Influenza       CVX: 135        10/22/2019      Complete

 

                Influenza       CVX: 135        2018      Complete

 

                Influenza       CVX: 135        10/06/2017      Complete

 

                Influenza       CVX: 135        10/07/2016      Complete

 

                Influenza       CVX: 135        10/16/2015       

 

                Influenza       CVX: 141        2013       

 

                Influenza (Adult) CVX: 141        10/06/2010       







Results





          Observation Observation Code Item      Item Code Result    Date      S

ervice Location

 

          GFR CALC  0529195   GFR Non Afr Amr           52 mL/min 10/11/2019 Unk

nown

 

          GFR CALC  3108469   GFR Afr Amr           >60 mL/min 10/11/2019 Unknow

n

 

          COMPREHENSIVE METABOLIC 38347     AST                 17 U/L    10/11/

2019 Unknown

 

          COMPREHENSIVE METABOLIC 88379     ALT                 11 U/L    10/11/

2019 Unknown

 

          COMPREHENSIVE METABOLIC 16238     BUN                 17 mg/dL  10/11/

2019 Unknown

 

          COMPREHENSIVE METABOLIC 83805     ALBUMIN             3.9 g/dL  10/11/

2019 Unknown

 

          COMPREHENSIVE METABOLIC 29770     CHLORIDE            108 mmol/L 10/11

/2019 Unknown

 

          COMPREHENSIVE METABOLIC 09840     Bili Total           0.5 mg/dL 10/11

/2019 Unknown

 

          COMPREHENSIVE METABOLIC 29376     ALK PHOS            72 U/L    10/11/

2019 Unknown

 

          COMPREHENSIVE METABOLIC 97397     SODIUM              142 mmol/L 10/11

/2019 Unknown

 

          COMPREHENSIVE METABOLIC 51783     CREATININE           1.02 mg/dL 10/1

2019 Unknown

 

          COMPREHENSIVE METABOLIC 13002     CALCIUM             9.3 mg/dL 10/11/

2019 Unknown

 

          COMPREHENSIVE METABOLIC 42124     POTASSIUM           4.0 mmol/L 10/11

/2019 Unknown

 

          COMPREHENSIVE METABOLIC 27919     Total Protein           6.2 g/dL  10

/2019 Unknown

 

          COMPREHENSIVE METABOLIC 30979     Glucose             93 mg/dL  10/11/

2019 Unknown

 

          COMPREHENSIVE METABOLIC 44007     Bicarbonate           27 mmol/L 10/1

2019 Unknown

 

          COMPREHENSIVE METABOLIC 01660     AGAP                7 mmol/L  10/11/

2019 Unknown

 

          GFR CALC  2076534   GFR Non Afr Amr           48 mL/min 06/10/2019 Unk

nown

 

          GFR CALC  9454928   GFR Afr Amr           59 mL/min 06/10/2019 Unknown

 

          THYROID STIMULATING HORMONE 45114     TSH                 1.936 uIU/mL

 06/10/2019 Unknown

 

          COMPREHENSIVE METABOLIC 75867     AST                 18 U/L    06/10/

2019 Unknown

 

          COMPREHENSIVE METABOLIC 43152     ALT                 11 U/L    06/10/

2019 Unknown

 

          COMPREHENSIVE METABOLIC 71921     BUN                 18 mg/dL  06/10/

2019 Unknown

 

          COMPREHENSIVE METABOLIC 17518     ALBUMIN             4.2 g/dL  06/10/

2019 Unknown

 

          COMPREHENSIVE METABOLIC 43313     CHLORIDE            109 mmol/L 06/10

/2019 Unknown

 

          COMPREHENSIVE METABOLIC 93602     Bili Total           0.4 mg/dL 06/10

/2019 Unknown

 

          COMPREHENSIVE METABOLIC 87305     ALK PHOS            64 U/L    06/10/

2019 Unknown

 

          COMPREHENSIVE METABOLIC 00220     SODIUM              142 mmol/L 06/10

/2019 Unknown

 

          COMPREHENSIVE METABOLIC 43416     CREATININE           1.09 mg/dL  Unknown

 

          COMPREHENSIVE METABOLIC 48587     CALCIUM             9.3 mg/dL 06/10/

2019 Unknown

 

          COMPREHENSIVE METABOLIC 98400     POTASSIUM           4.7 mmol/L 06/10

/2019 Unknown

 

          COMPREHENSIVE METABOLIC 23734     Total Protein           6.3 g/dL  06

/10/2019 Unknown

 

          COMPREHENSIVE METABOLIC 18798     Glucose             84 mg/dL  06/10/

2019 Unknown

 

          COMPREHENSIVE METABOLIC 85601     Bicarbonate           25 mmol/L  Unknown

 

          COMPREHENSIVE METABOLIC 07512     AGAP                8 mmol/L  06/10/

2019 Unknown

 

          COMPLETE BLOOD COUNT [id number]   WBC                 7.8 10e9/L 06/10/20

19 Unknown

 

          COMPLETE BLOOD COUNT [id number]   RBC                 4.04 10e12/L 06/10/

2019 Unknown

 

          COMPLETE BLOOD COUNT [id number]   HEMOGLOBIN           12.2 g/dL 06/10/20

19 Unknown

 

          COMPLETE BLOOD COUNT [id number]   HEMATOCRIT           38.6 %    06/10/20

19 Unknown

 

          COMPLETE BLOOD COUNT [id number]   MCV                 95.5 fL   06/10/201

9 Unknown

 

          COMPLETE BLOOD COUNT [id number]   MCH                 30.2 pg   06/10/201

9 Unknown

 

          COMPLETE BLOOD COUNT [id number]   MCHC                31.6 g/dL 06/10/201

9 Unknown

 

          COMPLETE BLOOD COUNT [id number]   PLATELET COUNT           215 10e9/L 06/

10/2019 Unknown

 

          COMPLETE BLOOD COUNT [id number]   Mean Plt Volume           11.2 fL   06/

10/2019 Unknown

 

          COMPLETE BLOOD COUNT [id number]   Neut Auto           45.7 %    06/10/201

9 Unknown

 

          COMPLETE BLOOD COUNT [id number]   Lymph Auto           42.1 %    06/10/20

19 Unknown

 

          COMPLETE BLOOD COUNT [id number]   Mono Auto           9.2 %     06/10/201

9 Unknown

 

          COMPLETE BLOOD COUNT [id number]   RDW                 13.4 %    06/10/201

9 Unknown

 

          COMPLETE BLOOD COUNT [id number]   Eos Auto            2.7 %     06/10/201

9 Unknown

 

          COMPLETE BLOOD COUNT [id number]   Baso Auto           0.3 %     06/10/201

9 Unknown

 

          COMPLETE BLOOD COUNT [id number]   Neutrophil Abs           3.56 10e9/L 06

/10/2019 Unknown

 

          COMPLETE BLOOD COUNT [id number]   Lymphocyte Abs           3.28 10e9/L 06

/10/2019 Unknown

 

          COMPLETE BLOOD COUNT [id number]   Monocyte Abs           0.72 10e9/L  Unknown

 

          COMPLETE BLOOD COUNT [id number]   Eosinophil Abs           0.21 10e9/L 06

/10/2019 Unknown

 

          COMPLETE BLOOD COUNT [id number]   RDW-SD              44.9 fL   06/10/201

9 Unknown

 

          COMPLETE BLOOD COUNT [id number]   Basophil Abs           0.02 10e9/L  Unknown

 

          COMPLETE BLOOD COUNT [id number]   WBC                 5.2 10e9/L 20

18 Unknown

 

          COMPLETE BLOOD COUNT [id number]   RBC                 4.21 10e12/L 2018 Unknown

 

          COMPLETE BLOOD COUNT [id number]   HEMOGLOBIN           12.8 g/dL 20

18 Unknown

 

          COMPLETE BLOOD COUNT [id number]   HEMATOCRIT           39.0 %    20

18 Unknown

 

          COMPLETE BLOOD COUNT [id number]   MCV                 92.6 fL   

8 Unknown

 

          COMPLETE BLOOD COUNT [id number]   MCH                 30.4 pg   

8 Unknown

 

          COMPLETE BLOOD COUNT [id number]   MCHC                32.8 g/dL 

8 Unknown

 

          COMPLETE BLOOD COUNT [id number]   PLATELET COUNT           202 10e9/L  Unknown

 

          COMPLETE BLOOD COUNT [id number]   Mean Plt Volume           10.7 fL    Unknown

 

          COMPLETE BLOOD COUNT [id number]   Neut Auto           40.9 %    

8 Unknown

 

          COMPLETE BLOOD COUNT [id number]   Lymph Auto           46.3 %    20

18 Unknown

 

          COMPLETE BLOOD COUNT [id number]   Mono Auto           9.3 %     

8 Unknown

 

          COMPLETE BLOOD COUNT [id number]   RDW                 13.7 %    

8 Unknown

 

          COMPLETE BLOOD COUNT [id number]   Eos Auto            2.9 %     

8 Unknown

 

          COMPLETE BLOOD COUNT [id number]   Baso Auto           0.6 %     

8 Unknown

 

          COMPLETE BLOOD COUNT [id number]   Neutrophil Abs           2.13 10e9/L  Unknown

 

          COMPLETE BLOOD COUNT [id number]   Lymphocyte Abs           2.41 10e9/L  Unknown

 

          COMPLETE BLOOD COUNT [id number]   Monocyte Abs           0.48 10e9/L  Unknown

 

          COMPLETE BLOOD COUNT [id number]   Eosinophil Abs           0.15 10e9/L  Unknown

 

          COMPLETE BLOOD COUNT [id number]   RDW-SD              45.2 fL   

8 Unknown

 

          COMPLETE BLOOD COUNT [id number]   Basophil Abs           0.03 10e9/L  Unknown

 

          [place] [address]     Glucose             118 mg/dL  Unknown

 

          [place] [address]     CREATININE           1.01 mg/dL  Unknown

 

          [place] [address]     BUN                 14 mg/dL   Unknown

 

          [place] [address]     SODIUM              141 mmol/L  Unknown

 

          [place] [address]     POTASSIUM           4.0 mmol/L  Unknown

 

          [place] [address]     CHLORIDE            108 mmol/L  Unknown

 

          [place] [address]     Bicarbonate           25 mmol/L  Unknown

 

          [place] [address]     AGAP                8 mmol/L   Unknown

 

          [place] [address]     CALCIUM             9.6 mg/dL  Unknown

 

          FREE T4   29330     T4 Free             1.23 ng/dL 2018 Unknown

 

          GFR CALC  3777738   GFR Non Afr Amr           53 mL/min 2018 Unk

nown

 

          GFR CALC  4174911   GFR Afr Amr           >60 mL/min 2018 Unknow

n

 

          THYROID STIMULATING HORMONE 29874     TSH                 2.124 uIU/mL

 2018 Unknown

 

          LIPID GROUP 20282     Cholesterol           152 mg/dL 2018 Unkno

wn

 

          LIPID GROUP 86058     Triglyceride           151 mg/dL 2018 Unkn

own

 

          LIPID GROUP 59133     HDL CHOLESTEROL           47 mg/dL  2018 U

nknown

 

          LIPID GROUP 29553     Chol/HDL Ratio           3.23 ratio 2018 U

nknown

 

          LIPID GROUP 54766     NON-HDL Chol           105 mg/dL 2018 Unkn

own

 

          LIPID GROUP 68484     LDL Cholesterol           75 mg/dL  2018 U

nknown

 

          ASSAY OF TROPONIN QUANT 78167     Troponin-I           <0.30 ng/mL  Unknown

 

          COMPREHENSIVE METABOLIC 45551     AST                 20 U/L    2018 Unknown

 

          COMPREHENSIVE METABOLIC 54308     ALT                 14 U/L    2018 Unknown

 

          COMPREHENSIVE METABOLIC 04421     BUN                 19 mg/dL  2018 Unknown

 

          COMPREHENSIVE METABOLIC 67864     ALBUMIN             4.2 g/dL  2018 Unknown

 

          COMPREHENSIVE METABOLIC 13675     CHLORIDE            102 mmol/L  Unknown

 

          COMPREHENSIVE METABOLIC 19307     Bili Total           0.4 mg/dL  Unknown

 

          COMPREHENSIVE METABOLIC 76850     ALK PHOS            66 U/L    2018 Unknown

 

          COMPREHENSIVE METABOLIC 62763     SODIUM              135 mmol/L  Unknown

 

          COMPREHENSIVE METABOLIC 41171     CREATININE           1.01 mg/dL  Unknown

 

          COMPREHENSIVE METABOLIC 50506     CALCIUM             9.3 mg/dL 2018 Unknown

 

          COMPREHENSIVE METABOLIC 98325     POTASSIUM           4.8 mmol/L  Unknown

 

          COMPREHENSIVE METABOLIC 60743     Total Protein           7.0 g/dL   Unknown

 

          COMPREHENSIVE METABOLIC 68738     Glucose             91 mg/dL  2018 Unknown

 

          COMPREHENSIVE METABOLIC 85457     Bicarbonate           23 mmol/L  Unknown

 

          COMPREHENSIVE METABOLIC 63936     AGAP                10 mmol/L 2018 Unknown

 

          COMPLETE BLOOD COUNT [id number]   WBC                 7.5 10e9/L 20

18 Unknown

 

          COMPLETE BLOOD COUNT [id number]   RBC                 4.13 10e12/L 2018 Unknown

 

          COMPLETE BLOOD COUNT [id number]   HEMOGLOBIN           12.6 g/dL 20

18 Unknown

 

          COMPLETE BLOOD COUNT [id number]   HEMATOCRIT           38.4 %    20

18 Unknown

 

          COMPLETE BLOOD COUNT [id number]   MCV                 93.0 fL   

8 Unknown

 

          COMPLETE BLOOD COUNT [id number]   MCH                 30.5 pg   

8 Unknown

 

          COMPLETE BLOOD COUNT [id number]   MCHC                32.8 g/dL 

8 Unknown

 

          COMPLETE BLOOD COUNT [id number]   PLATELET COUNT           204 10e9/L  Unknown

 

          COMPLETE BLOOD COUNT [id number]   Mean Plt Volume           10.9 fL    Unknown

 

          COMPLETE BLOOD COUNT [id number]   Neut Auto           43.1 %    

8 Unknown

 

          COMPLETE BLOOD COUNT [id number]   Lymph Auto           45.0 %    20

18 Unknown

 

          COMPLETE BLOOD COUNT [id number]   Mono Auto           9.2 %     

8 Unknown

 

          COMPLETE BLOOD COUNT [id number]   RDW                 13.4 %    

8 Unknown

 

          COMPLETE BLOOD COUNT [id number]   Eos Auto            2.3 %     

8 Unknown

 

          COMPLETE BLOOD COUNT [id number]   Baso Auto           0.4 %     

8 Unknown

 

          COMPLETE BLOOD COUNT [id number]   Neutrophil Abs           3.23 10e9/L  Unknown

 

          COMPLETE BLOOD COUNT [id number]   Lymphocyte Abs           3.38 10e9/L  Unknown

 

          COMPLETE BLOOD COUNT [id number]   Monocyte Abs           0.69 10e9/L  Unknown

 

          COMPLETE BLOOD COUNT [id number]   Eosinophil Abs           0.17 10e9/L  Unknown

 

          COMPLETE BLOOD COUNT [id number]   RDW-SD              44.4 fL   

8 Unknown

 

          COMPLETE BLOOD COUNT [id number]   Basophil Abs           0.03 10e9/L  Unknown

 

          GFR CALC  8065897   GFR Non Afr Amr           53 mL/min 2018 Unk

nown

 

          GFR CALC  0787025   GFR Afr Amr           >60 mL/min 2018 Unknow

n

 

          GLYCOSYLATED HEMOGLOBIN TEST 06358     Hgb A1c   99597-2   5.4 %     0

2018 Unknown

 

          MEAN GLUC 7804528   Calc Mean Gluc           108 mg/dL 2018 Unkn

own

 

          MEAN GLUC 7331333   Calc Mean Gluc           114 mg/dL 2017 Unkn

own

 

          LIPID GROUP 03917     Cholesterol           146 mg/dL 2017 Unkno

wn

 

          LIPID GROUP 98624     Triglyceride           119 mg/dL 2017 Unkn

own

 

          LIPID GROUP 03547     HDL CHOLESTEROL           47 mg/dL  2017 U

nknown

 

          LIPID GROUP 71561     Chol/HDL Ratio           3.11 ratio 2017 U

nknown

 

          LIPID GROUP 68183     NON-HDL Chol           99 mg/dL  2017 Unkn

own

 

          LIPID GROUP 78718     LDL Cholesterol           75 mg/dL  2017 U

nknown

 

          GLYCOSYLATED HEMOGLOBIN TEST 98836     Hgb A1c   90133-4   5.6 %     0

2017 Unknown

 

          COMPREHENSIVE METABOLIC 42917     AST                 22 U/L    2017 Unknown

 

          COMPREHENSIVE METABOLIC 05369     ALT                 12 U/L    2017 Unknown

 

          COMPREHENSIVE METABOLIC 83299     BUN                 17 mg/dL  2017 Unknown

 

          COMPREHENSIVE METABOLIC 53348     ALBUMIN             4.0 g/dL  2017 Unknown

 

          COMPREHENSIVE METABOLIC 92099     CHLORIDE            110 mmol/L  Unknown

 

          COMPREHENSIVE METABOLIC 99347     Bili Total           0.4 mg/dL  Unknown

 

          COMPREHENSIVE METABOLIC 18150     ALK PHOS            63 U/L    2017 Unknown

 

          COMPREHENSIVE METABOLIC 11316     SODIUM              140 mmol/L  Unknown

 

          COMPREHENSIVE METABOLIC 94704     CREATININE           1.05 mg/dL  Unknown

 

          COMPREHENSIVE METABOLIC 71848     CALCIUM             9.2 mg/dL 2017 Unknown

 

          COMPREHENSIVE METABOLIC 20656     POTASSIUM           4.2 mmol/L  Unknown

 

          COMPREHENSIVE METABOLIC 70474     Total Protein           6.2 g/dL   Unknown

 

          COMPREHENSIVE METABOLIC 68321     Glucose             87 mg/dL  2017 Unknown

 

          COMPREHENSIVE METABOLIC 84552     Bicarbonate           24 mmol/L  Unknown

 

          COMPREHENSIVE METABOLIC 63120     AGAP                6 mmol/L  2017 Unknown

 

          GFR CALC  5718287   GFR Non Afr Amr           51 mL/min 2017 Unk

nown

 

          GFR CALC  7008964   GFR Afr Amr           >60 mL/min 2017 Unknow

n

 

          COMPLETE BLOOD COUNT [id number]   WBC                 6.7 10e9/L 20

17 Unknown

 

          COMPLETE BLOOD COUNT [id number]   RBC                 4.04 10e12/L 2017 Unknown

 

          COMPLETE BLOOD COUNT [id number]   HEMOGLOBIN           12.1 g/dL 20

17 Unknown

 

          COMPLETE BLOOD COUNT [id number]   HEMATOCRIT           38.0 %    20

17 Unknown

 

          COMPLETE BLOOD COUNT [id number]   MCV                 94.1 fL   

7 Unknown

 

          COMPLETE BLOOD COUNT [id number]   MCH                 30.0 pg   

7 Unknown

 

          COMPLETE BLOOD COUNT [id number]   MCHC                31.8 g/dL 

7 Unknown

 

          COMPLETE BLOOD COUNT [id number]   PLATELET COUNT           206 10e9/L  Unknown

 

          COMPLETE BLOOD COUNT [id number]   Mean Plt Volume           11.3 fL    Unknown

 

          COMPLETE BLOOD COUNT [id number]   Neut Auto           35.8 %    

7 Unknown

 

          COMPLETE BLOOD COUNT [id number]   Lymph Auto           51.6 %    20

17 Unknown

 

          COMPLETE BLOOD COUNT [id number]   Mono Auto           8.8 %     

7 Unknown

 

          COMPLETE BLOOD COUNT [id number]   RDW                 13.5 %    

7 Unknown

 

          COMPLETE BLOOD COUNT [id number]   Eos Auto            3.4 %     

7 Unknown

 

          COMPLETE BLOOD COUNT [id number]   Baso Auto           0.4 %     

7 Unknown

 

          COMPLETE BLOOD COUNT [id number]   Neutrophil Abs           2.40 10e9/L  Unknown

 

          COMPLETE BLOOD COUNT [id number]   Lymphocyte Abs           3.46 10e9/L  Unknown

 

          COMPLETE BLOOD COUNT [id number]   Monocyte Abs           0.59 10e9/L  Unknown

 

          COMPLETE BLOOD COUNT [id number]   Eosinophil Abs           0.23 10e9/L  Unknown

 

          COMPLETE BLOOD COUNT [id number]   RDW-SD              45.3 fL   

7 Unknown

 

          COMPLETE BLOOD COUNT [id number]   Basophil Abs           0.03 10e9/L  Unknown

 

          THYROID STIMULATING HORMONE 10881     TSH                 1.981 uIU/mL

 2017 Unknown

 

          COMPLETE BLOOD COUNT [id number]   WBC                 6.0 10e9/L 20

17 Unknown

 

          COMPLETE BLOOD COUNT [id number]   RBC                 4.29 10e12/L 2017 Unknown

 

          COMPLETE BLOOD COUNT [id number]   HEMOGLOBIN           12.9 g/dL 20

17 Unknown

 

          COMPLETE BLOOD COUNT [id number]   HEMATOCRIT           38.4 %    20

17 Unknown

 

          COMPLETE BLOOD COUNT [id number]   MCV                 89.5 fL   

7 Unknown

 

          COMPLETE BLOOD COUNT [id number]   MCH                 30.1 pg   

7 Unknown

 

          COMPLETE BLOOD COUNT [id number]   MCHC                33.6 g/dL 

7 Unknown

 

          COMPLETE BLOOD COUNT [id number]   PLATELET COUNT           181 10e9/L 2017 Unknown

 

          COMPLETE BLOOD COUNT [id number]   Mean Plt Volume           11.7 fL   2017 Unknown

 

          COMPLETE BLOOD COUNT [id number]   Neut Auto           36.9 %    

7 Unknown

 

          COMPLETE BLOOD COUNT [id number]   Lymph Auto           50.4 %    20

17 Unknown

 

          COMPLETE BLOOD COUNT [id number]   Mono Auto           9.0 %     

7 Unknown

 

          COMPLETE BLOOD COUNT [id number]   RDW                 13.7 %    

7 Unknown

 

          COMPLETE BLOOD COUNT [id number]   Eos Auto            3.4 %     

7 Unknown

 

          COMPLETE BLOOD COUNT [id number]   Baso Auto           0.3 %     

7 Unknown

 

          COMPLETE BLOOD COUNT [id number]   Neutrophil Abs           2.21 10e9/L  Unknown

 

          COMPLETE BLOOD COUNT [id number]   Lymphocyte Abs           3.02 10e9/L  Unknown

 

          COMPLETE BLOOD COUNT [id number]   Monocyte Abs           0.54 10e9/L 2017 Unknown

 

          COMPLETE BLOOD COUNT [id number]   Eosinophil Abs           0.20 10e9/L  Unknown

 

          COMPLETE BLOOD COUNT [id number]   RDW-SD              44.0 fL   

7 Unknown

 

          COMPLETE BLOOD COUNT [id number]   Basophil Abs           0.02 10e9/L 2017 Unknown

 

          GLYCOSYLATED HEMOGLOBIN TEST 06103     Hgb A1c   51163-3   5.4 %     0

3/09/2017 Unknown

 

          THYROID STIMULATING HORMONE 61910     TSH                 2.200 uIU/mL

 2017 Unknown

 

          GFR CALC  7950851   GFR Non Afr Amr           50 mL/min 2017 Unk

nown

 

          GFR CALC  5257898   GFR Afr Amr           >60 mL/min 2017 Unknow

n

 

          MEAN GLUC 7656915   Calc Mean Gluc           108 mg/dL 2017 Unkn

own

 

          COMPREHENSIVE METABOLIC 87664     AST                 18 U/L    2017 Unknown

 

          COMPREHENSIVE METABOLIC 12367     ALT                 10 U/L    2017 Unknown

 

          COMPREHENSIVE METABOLIC 29900     BUN                 20 mg/dL  2017 Unknown

 

          COMPREHENSIVE METABOLIC 38922     ALBUMIN             4.1 g/dL  2017 Unknown

 

          COMPREHENSIVE METABOLIC 58670     CHLORIDE            109 mmol/L  Unknown

 

          COMPREHENSIVE METABOLIC 43613     Bili Total           0.6 mg/dL  Unknown

 

          COMPREHENSIVE METABOLIC 03269     ALK PHOS            64 U/L    2017 Unknown

 

          COMPREHENSIVE METABOLIC 31932     SODIUM              141 mmol/L  Unknown

 

          COMPREHENSIVE METABOLIC 02579     CREATININE           1.06 mg/dL 2017 Unknown

 

          COMPREHENSIVE METABOLIC 84760     CALCIUM             9.9 mg/dL 2017 Unknown

 

          COMPREHENSIVE METABOLIC 34415     POTASSIUM           4.2 mmol/L  Unknown

 

          COMPREHENSIVE METABOLIC 24558     Total Protein           6.3 g/dL   Unknown

 

          COMPREHENSIVE METABOLIC 91052     Glucose             99 mg/dL  2017 Unknown

 

          COMPREHENSIVE METABOLIC 22129     Bicarbonate           21 mmol/L 2017 Unknown

 

          COMPREHENSIVE METABOLIC 05058     AGAP                11 mmol/L 2017 Unknown

 

          LIPID GROUP [id number]     Cholesterol           169 mg/dL 2016 Unkno

wn

 

          LIPID GROUP [id number]     Triglyceride           165 mg/dL 2016 Unkn

own

 

          LIPID GROUP [id number]     HDL CHOLESTEROL           43 mg/dL  2016 U

nknown

 

          LIPID GROUP 74717     Chol/HDL Ratio           3.93 ratio 2016 U

nknown

 

          LIPID GROUP 30947     NON-HDL Chol           126 mg/dL 2016 Unkn

own

 

          LIPID GROUP 08433     LDL Cholesterol           93 mg/dL  2016 U

nknown

 

          COMPREHENSIVE METABOLIC 09184     AST                 18 U/L    2016 Unknown

 

          COMPREHENSIVE METABOLIC 62705     ALT                 10 U/L    2016 Unknown

 

          COMPREHENSIVE METABOLIC 68365     BUN                 20 mg/dL  2016 Unknown

 

          COMPREHENSIVE METABOLIC 67574     ALBUMIN             3.9 g/dL  2016 Unknown

 

          COMPREHENSIVE METABOLIC 38230     CHLORIDE            110 mmol/L  Unknown

 

          COMPREHENSIVE METABOLIC 75914     Bili Total           0.5 mg/dL  Unknown

 

          COMPREHENSIVE METABOLIC 20351     ALK PHOS            72 U/L    2016 Unknown

 

          COMPREHENSIVE METABOLIC 59158     SODIUM              141 mmol/L  Unknown

 

          COMPREHENSIVE METABOLIC 79660     CREATININE           1.12 mg/dL  Unknown

 

          COMPREHENSIVE METABOLIC 19635     CALCIUM             9.7 mg/dL 2016 Unknown

 

          COMPREHENSIVE METABOLIC 50515     POTASSIUM           4.4 mmol/L  Unknown

 

          COMPREHENSIVE METABOLIC 88145     Total Protein           6.2 g/dL   Unknown

 

          COMPREHENSIVE METABOLIC 04104     Glucose             90 mg/dL  2016 Unknown

 

          COMPREHENSIVE METABOLIC 73689     Bicarbonate           23 mmol/L  Unknown

 

          COMPREHENSIVE METABOLIC 38583     AGAP                8 mmol/L  2016 Unknown

 

          GFR CALC  6284974   GFR Non Afr Amr           47 mL/min 2016 Unk

nown

 

          GFR CALC  5503086   GFR Afr Amr           57 mL/min 2016 Unknown

 

          GLYCOSYLATED HEMOGLOBIN TEST 42437     Hgb A1c   63719-3   5.5 %     0

2016 Unknown

 

          THYROID STIMULATING HORMONE 40797     TSH                 2.537 uIU/mL

 2016 Unknown

 

          FREE T4   84002     T4 Free             1.36 ng/dL 2016 Unknown

 

          COMPLETE BLOOD COUNT [id number]   WBC                 6.8 10e9/L 20

16 Unknown

 

          COMPLETE BLOOD COUNT [id number]   RBC                 4.20 10e12/L 2016 Unknown

 

          COMPLETE BLOOD COUNT [id number]   HEMOGLOBIN           12.5 g/dL 20

16 Unknown

 

          COMPLETE BLOOD COUNT [id number]   HEMATOCRIT           38.0 %    20

16 Unknown

 

          COMPLETE BLOOD COUNT 7641328   MCV                 90.5 fL   

6 Unknown

 

          COMPLETE BLOOD COUNT [id number]   MCH                 29.8 pg   

6 Unknown

 

          COMPLETE BLOOD COUNT [id number]   MCHC                32.9 g/dL 

6 Unknown

 

          COMPLETE BLOOD COUNT [id number]   PLATELET COUNT           197 10e9/L  Unknown

 

          COMPLETE BLOOD COUNT 8911842   Mean Plt Volume           11.7 fL    Unknown

 

          COMPLETE BLOOD COUNT [id number]   Neut Auto           41.3 %    

6 Unknown

 

          COMPLETE BLOOD COUNT [id number]   Lymph Auto           47.1 %    20

16 Unknown

 

          COMPLETE BLOOD COUNT [id number]   Mono Auto           7.8 %     

6 Unknown

 

          COMPLETE BLOOD COUNT [id number]   RDW                 13.8 %    

6 Unknown

 

          COMPLETE BLOOD COUNT 7421645   Eos Auto            3.4 %     

6 Unknown

 

          COMPLETE BLOOD COUNT 8951303   Baso Auto           0.4 %     

6 Unknown

 

          COMPLETE BLOOD COUNT 7776576   Neutrophil Abs           2.81 10e9/L  Unknown

 

          COMPLETE BLOOD COUNT [id number]   Lymphocyte Abs           3.20 10e9/L  Unknown

 

          COMPLETE BLOOD COUNT 3408571   Monocyte Abs           0.53 10e9/L  Unknown

 

          COMPLETE BLOOD COUNT [id number]   Eosinophil Abs           0.23 10e9/L  Unknown

 

          COMPLETE BLOOD COUNT [id number]   RDW-SD              44.4 fL   

6 Unknown

 

          COMPLETE BLOOD COUNT [id number]   Basophil Abs           0.03 10e9/L  Unknown

 

          MEAN GLUC 9321166   Calc Mean Gluc           111 mg/dL 2016 Unkn

own

 

          [place] [address]     Glucose             89 MG/DL   Unknown

 

          [place] [address]     CREATININE           1.12 MG/DL  Unknown

 

          [place] [address]     BUN                 20 MG/DL   Unknown

 

          [place] [address]     SODIUM              139 MMOL/L  Unknown

 

          [place] [address]     POTASSIUM           4.6 MMOL/L  Unknown

 

          [place] [address]     CHLORIDE            108 MMOL/L  Unknown

 

          [place] [address]     BICARB              26 MMOL/L  Unknown

 

          [place] [address]     ANION GAP           5 MEQ/L    Unknown

 

          [place] [address]     CALCIUM             10.0 MG/DL  Unknown

 

          GFR CALC  6814437   GFR AA              57.0L ML/MIN 2015 Unknow

n

 

          GFR CALC  6821010   GFR NON-AA           47.0L ML/MIN 2015 Unkno

wn

 

          THYROID STIMULATING HORMONE 36247     TSH                 2.378 uIU/ML

 09/10/2015 Unknown

 

          COMPLETE BLOOD COUNT [id number]   WBC                 6.4 10e9/L 09/10/20

15 Unknown

 

          COMPLETE BLOOD COUNT [id number]   RBC                 3.99 10e12/L 09/10/

2015 Unknown

 

          COMPLETE BLOOD COUNT 0482502   HGB                 11.9 g/dL 09/10/201

5 Unknown

 

          COMPLETE BLOOD COUNT 3286066   HCT DET             36.9 %    09/10/201

5 Unknown

 

          COMPLETE BLOOD COUNT [id number]   MCV                 92.5 fL   09/10/201

5 Unknown

 

          COMPLETE BLOOD COUNT [id number]   MCH                 29.8 pg   09/10/201

5 Unknown

 

          COMPLETE BLOOD COUNT [id number]   MCHC                32.2 g/dL 09/10/201

5 Unknown

 

          COMPLETE BLOOD COUNT [id number]   PLT                 172 10e9/L 09/10/20

15 Unknown

 

          COMPLETE BLOOD COUNT [id number]   MPV                 11.7 fL   09/10/201

5 Unknown

 

          COMPLETE BLOOD COUNT [id number]   ARIK %               40.4 %    09/10/201

5 Unknown

 

          COMPLETE BLOOD COUNT 7400846   LY %                48.0 %    09/10/201

5 Unknown

 

          COMPLETE BLOOD COUNT [id number]   MON %               8.3 %     09/10/201

5 Unknown

 

          COMPLETE BLOOD COUNT [id number]   EOS  %              2.8 %     09/10/201

5 Unknown

 

          COMPLETE BLOOD COUNT 7249951   BASO %              0.5 %     09/10/201

5 Unknown

 

          COMPLETE BLOOD COUNT [id number]   RDW                 13.6 %    09/10/201

5 Unknown

 

          COMPLETE BLOOD COUNT [id number]   ABS ARIK             2.59 10e9/L 09/10/2

015 Unknown

 

          COMPLETE BLOOD COUNT [id number]   ABS LYMPH           3.07 10e9/L 09/10/2

015 Unknown

 

          COMPLETE BLOOD COUNT [id number]   ABS MONO            0.53 10e9/L 09/10/2

015 Unknown

 

          COMPLETE BLOOD COUNT [id number]   ABS EOS             0.18 10e9/L 09/10/2

015 Unknown

 

          COMPLETE BLOOD COUNT [id number]   ABS BASO            0.03 10e9/L 09/10/2

015 Unknown

 

          COMPLETE BLOOD COUNT [id number]   RDW-SD              44.9 fL   09/10/201

5 Unknown

 

          LIPID GROUP [id number]     HDL TEST            42 MG/DL  09/10/2015 Unknown

 

          LIPID GROUP [id number]     TRIG                177 MG/DL 09/10/2015 Unknown

 

          LIPID GROUP [id number]     TEST LDL            72 MG/DL  09/10/2015 Unknown

 

          LIPID GROUP [id number]     CHOL                149 MG/DL 09/10/2015 Unknown

 

          LIPID GROUP [id number]     RCHOL/HDL           3.55 RATIO 09/10/2015 Unknow

n

 

          LIPID GROUP [id number]     NON-HDL CH           107 MG/DL 09/10/2015 Unknow

n

 

          GLYCOSYLATED HEMOGLOBIN TEST 33249     A1C HPLC  63329-5   5.5 %     0

9/10/2015 Unknown

 

          FREE T4   86695     FREE T4             1.39 NG/DL 09/10/2015 Unknown

 

          GFR CALC  1760418   GFR AA              55.0L ML/MIN 09/10/2015 Unknow

n

 

          GFR CALC  4560844   GFR NON-AA           46.0L ML/MIN 09/10/2015 Unkno

wn

 

          COMPREHENSIVE METABOLIC 46563     AST                 17 U/L    09/10/

2015 Unknown

 

          COMPREHENSIVE METABOLIC 02127     ALT                 10 IU/L   09/10/

2015 Unknown

 

          COMPREHENSIVE METABOLIC 63150     BUN                 20 MG/DL  09/10/

2015 Unknown

 

          COMPREHENSIVE METABOLIC 94490     ALBUMIN             3.9 GM/DL 09/10/

2015 Unknown

 

          COMPREHENSIVE METABOLIC 27179     CHLORIDE            111 MMOL/L 09/10

/2015 Unknown

 

          COMPREHENSIVE METABOLIC 12539     BILI TOT            0.4 MG/DL 09/10/

2015 Unknown

 

          COMPREHENSIVE METABOLIC 88689     ALK PHOS            70 U/L    09/10/

2015 Unknown

 

          COMPREHENSIVE METABOLIC 48328     SODIUM              142 MMOL/L 09/10

/2015 Unknown

 

          COMPREHENSIVE METABOLIC 70032     CREATININE           1.16 MG/DL  Unknown

 

          COMPREHENSIVE METABOLIC 37882     CALCIUM             9.4 MG/DL 09/10/

2015 Unknown

 

          COMPREHENSIVE METABOLIC 30240     POTASSIUM           4.6 MMOL/L 09/10

/2015 Unknown

 

          COMPREHENSIVE METABOLIC 30040     PROT TOT            6.2 GM/DL 09/10/

2015 Unknown

 

          COMPREHENSIVE METABOLIC 04144     Glucose             90 MG/DL  09/10/

2015 Unknown

 

          COMPREHENSIVE METABOLIC 54139     BICARB              24 MMOL/L 09/10/

2015 Unknown

 

          COMPREHENSIVE METABOLIC 59616     ANION GAP           7 MEQ/L   09/10/

2015 Unknown

 

          THYROID STIMULATING HORMONE 54271     TSH                 2.427 uIU/ML

 2015 Unknown

 

          LIPID GROUP [id number]     HDL TEST            47 MG/DL  2015 Unknown

 

          LIPID GROUP 41875     TRIG                145 MG/DL 2015 Unknown

 

          LIPID GROUP [id number]     TEST LDL            73 MG/DL  2015 Unknown

 

          LIPID GROUP 25648     CHOL                149 MG/DL 2015 Unknown

 

          LIPID GROUP [id number]     RCHOL/HDL           3.17 RATIO 2015 Unknow

n

 

          LIPID GROUP 48650     NON-HDL CH           102 MG/DL 2015 Unknow

n

 

          COMPREHENSIVE METABOLIC 70406     AST                 17 U/L    2015 Unknown

 

          COMPREHENSIVE METABOLIC 99083     ALT                 9 IU/L    2015 Unknown

 

          COMPREHENSIVE METABOLIC 45068     BUN                 19 MG/DL  2015 Unknown

 

          COMPREHENSIVE METABOLIC 05358     ALBUMIN             4.3 GM/DL 2015 Unknown

 

          COMPREHENSIVE METABOLIC 72819     CHLORIDE            108 MMOL/L  Unknown

 

          COMPREHENSIVE METABOLIC 19160     BILI TOT            0.5 MG/DL 2015 Unknown

 

          COMPREHENSIVE METABOLIC 86900     ALK PHOS            68 U/L    2015 Unknown

 

          COMPREHENSIVE METABOLIC 23800     SODIUM              140 MMOL/L  Unknown

 

          COMPREHENSIVE METABOLIC 95390     CREATININE           1.08 MG/DL 2015 Unknown

 

          COMPREHENSIVE METABOLIC 66772     CALCIUM             9.9 MG/DL 2015 Unknown

 

          COMPREHENSIVE METABOLIC 97545     POTASSIUM           4.3 MMOL/L  Unknown

 

          COMPREHENSIVE METABOLIC 28034     PROT TOT            7.2 GM/DL 2015 Unknown

 

          COMPREHENSIVE METABOLIC 58018     Glucose             94 MG/DL  2015 Unknown

 

          COMPREHENSIVE METABOLIC 35596     BICARB              26 MMOL/L 2015 Unknown

 

          COMPREHENSIVE METABOLIC 93005     ANION GAP           6 MEQ/L   2015 Unknown

 

          GFR CALC  9928710   GFR AA              60.0L ML/MIN 2015 Unknow

n

 

          GFR CALC  9284710   GFR NON-AA           49.0L ML/MIN 2015 Unkno

wn

 

          GLYCOSYLATED HEMOGLOBIN TEST 32944     A1C HPLC  06531-4   5.6 %     0

3/06/2015 Unknown

 

          COMPLETE BLOOD COUNT 4704048   WBC                 7.2 10e9/L 20

15 Unknown

 

          COMPLETE BLOOD COUNT 9682585   RBC                 4.28 10e12/L 2015 Unknown

 

          COMPLETE BLOOD COUNT 9392149   HGB                 12.8 g/dL 

5 Unknown

 

          COMPLETE BLOOD COUNT 7386977   HCT DET             39.3 %    

5 Unknown

 

          COMPLETE BLOOD COUNT 9192621   MCV                 91.8 fL   

5 Unknown

 

          COMPLETE BLOOD COUNT 1591242   MCH                 29.9 pg   

5 Unknown

 

          COMPLETE BLOOD COUNT [id number]   MCHC                32.6 g/dL 

5 Unknown

 

          COMPLETE BLOOD COUNT [id number]   PLT                 189 10e9/L 20

15 Unknown

 

          COMPLETE BLOOD COUNT 5489624   MPV                 11.2 fL   

5 Unknown

 

          COMPLETE BLOOD COUNT 9496392   ARIK %               38.0 %    

5 Unknown

 

          COMPLETE BLOOD COUNT 5661051   LY %                51.0 %    

5 Unknown

 

          COMPLETE BLOOD COUNT 8584055   MON %               7.7 %     

5 Unknown

 

          COMPLETE BLOOD COUNT 8536709   EOS  %              2.9 %     

5 Unknown

 

          COMPLETE BLOOD COUNT 9666644   BASO %              0.4 %     

5 Unknown

 

          COMPLETE BLOOD COUNT 1668366   RDW                 14.0 %    

5 Unknown

 

          COMPLETE BLOOD COUNT [id number]   ABS ARIK             2.74 10e9/L 

015 Unknown

 

          COMPLETE BLOOD COUNT [id number]   ABS LYMPH           3.67 10e9/L 

015 Unknown

 

          COMPLETE BLOOD COUNT 2984680   ABS MONO            0.55 10e9/L 

015 Unknown

 

          COMPLETE BLOOD COUNT 5347645   ABS EOS             0.21 10e9/L 

015 Unknown

 

          COMPLETE BLOOD COUNT 9108994   ABS BASO            0.03 10e9/L 

015 Unknown

 

          COMPLETE BLOOD COUNT 0623156   RDW-SD              46.1 fL   

5 Unknown

 

          FREE T4   78764     FREE T4             1.14 NG/DL 2015 Unknown

 

          GLYCOSYLATED HEMOGLOBIN TEST 10786     A1C HPLC  61411-9   5.2 %     0

2014 Unknown

 

          FREE T4   25155     FREE T4             1.40 NG/DL 2014 Unknown

 

          GFR CALC  2822719   GFR AA              >60 ML/MIN 2014 Unknown

 

          GFR CALC  4298809   GFR NON-AA           52.0L ML/MIN 2014 Unkno

wn

 

          COMPREHENSIVE METABOLIC 79709     AST                 15 U/L    2014 Unknown

 

          COMPREHENSIVE METABOLIC 85453     ALT                 9 IU/L    2014 Unknown

 

          COMPREHENSIVE METABOLIC 16877     BUN                 17 MG/DL  2014 Unknown

 

          COMPREHENSIVE METABOLIC 65994     ALBUMIN             4.0 GM/DL 2014 Unknown

 

          COMPREHENSIVE METABOLIC 28782     CHLORIDE            112 MMOL/L  Unknown

 

          COMPREHENSIVE METABOLIC 91830     BILI TOT            0.5 MG/DL 2014 Unknown

 

          COMPREHENSIVE METABOLIC 00205     ALK PHOS            66 U/L    2014 Unknown

 

          COMPREHENSIVE METABOLIC 53600     SODIUM              140 MMOL/L  Unknown

 

          COMPREHENSIVE METABOLIC 65219     CREATININE           1.03 MG/DL  Unknown

 

          COMPREHENSIVE METABOLIC 14009     CALCIUM             9.5 MG/DL 2014 Unknown

 

          COMPREHENSIVE METABOLIC 50591     POTASSIUM           4.1 MMOL/L  Unknown

 

          COMPREHENSIVE METABOLIC 25368     PROT TOT            6.2 GM/DL 2014 Unknown

 

          COMPREHENSIVE METABOLIC 53458     Glucose             102 MG/DL 2014 Unknown

 

          COMPREHENSIVE METABOLIC 98021     BICARB              23 MMOL/L 2014 Unknown

 

          COMPREHENSIVE METABOLIC 83933     ANION GAP           5 MEQ/L   2014 Unknown

 

          THYROID STIMULATING HORMONE 11454     TSH                 2.074 uIU/ML

 2014 Unknown

 

          VITAMIN B 12 FOLIC ACID [id number] VIT B 12            423 PG/ML  Unknown

 

          VITAMIN B 12 FOLIC ACID 59001|[id number] FOLIC ACID           19.7 NG/ML  Unknown

 

          LIPID GROUP [id number]     HDL TEST            40 MG/DL  2014 Unknown

 

          LIPID GROUP [id number]     TRIG                145 MG/DL 2014 Unknown

 

          LIPID GROUP 61283     TEST LDL            81 MG/DL  2014 Unknown

 

          LIPID GROUP 65033     CHOL                150 MG/DL 2014 Unknown

 

          LIPID GROUP [id number]     RCHOL/HDL           3.75 RATIO 2014 Unknow

n

 

          COMPLETE BLOOD COUNT 4130006   WBC                 6.0 10e9/L 20

14 Unknown

 

          COMPLETE BLOOD COUNT 1244117   RBC                 4.26 10e12/L 2014 Unknown

 

          COMPLETE BLOOD COUNT 3495090   HGB                 12.7 g/dL 

4 Unknown

 

          COMPLETE BLOOD COUNT 9195894   HCT DET             38.7 %    

4 Unknown

 

          COMPLETE BLOOD COUNT 0107452   MCV                 90.8 fL   

4 Unknown

 

          COMPLETE BLOOD COUNT 0385192   MCH                 29.8 pg   

4 Unknown

 

          COMPLETE BLOOD COUNT 4739041   MCHC                32.8 g/dL 

4 Unknown

 

          COMPLETE BLOOD COUNT [id number]   PLT                 178 10e9/L 20

14 Unknown

 

          COMPLETE BLOOD COUNT [id number]   MPV                 11.7 fL   

4 Unknown

 

          COMPLETE BLOOD COUNT 3398581   ARIK %               30.5 %    

4 Unknown

 

          COMPLETE BLOOD COUNT 1326838   LY %                55.4 %    

4 Unknown

 

          COMPLETE BLOOD COUNT [id number]   MON %               9.0 %     

4 Unknown

 

          COMPLETE BLOOD COUNT 5600301   EOS  %              4.4 %     

4 Unknown

 

          COMPLETE BLOOD COUNT 8347150   BASO %              0.7 %     

4 Unknown

 

          COMPLETE BLOOD COUNT 6458253   RDW                 13.3 %    

4 Unknown

 

          COMPLETE BLOOD COUNT 7753147   ABS ARIK             1.83 10e9/L 

014 Unknown

 

          COMPLETE BLOOD COUNT [id number]   ABS LYMPH           3.32 10e9/L 

014 Unknown

 

          COMPLETE BLOOD COUNT [id number]   ABS MONO            0.54 10e9/L 

014 Unknown

 

          COMPLETE BLOOD COUNT [id number]   ABS EOS             0.26 10e9/L 

014 Unknown

 

          COMPLETE BLOOD COUNT 2929415   ABS BASO            0.04 10e9/L 

014 Unknown

 

          COMPLETE BLOOD COUNT 0931955   RDW-SD              43.2 fL   

4 Unknown

 

          HEMOGLOBIN A1C (GLYCOSYLATED) 4209272   A1C HPLC  76254-8   5.5 %     

2012 Unknown

 

          COMPLETE BLOOD COUNT [id number]   WBC                 6.0 10e9/L 20

12 Unknown

 

          COMPLETE BLOOD COUNT [id number]   RBC                 4.22 10e12/L 2012 Unknown

 

          COMPLETE BLOOD COUNT 1450192   HGB                 12.4 g/dL 

2 Unknown

 

          COMPLETE BLOOD COUNT [id number]   HCT DET             38.2 %    

2 Unknown

 

          COMPLETE BLOOD COUNT 4375003   MCV                 90.5 fL   

2 Unknown

 

          COMPLETE BLOOD COUNT [id number]   MCH                 29.4 pg   

2 Unknown

 

          COMPLETE BLOOD COUNT [id number]   MCHC                32.5 g/dL 

2 Unknown

 

          COMPLETE BLOOD COUNT [id number]   PLT                 187 10e9/L 20

12 Unknown

 

          COMPLETE BLOOD COUNT [id number]   MPV                 11.5 fL   

2 Unknown

 

          COMPLETE BLOOD COUNT [id number]   ARIK %               36.4 %    

2 Unknown

 

          COMPLETE BLOOD COUNT [id number]   LY %                51.0 %    

2 Unknown

 

          COMPLETE BLOOD COUNT [id number]   MON %               8.7 %     

2 Unknown

 

          COMPLETE BLOOD COUNT [id number]   EOS  %              3.2 %     

2 Unknown

 

          COMPLETE BLOOD COUNT 5347769   BASO %              0.7 %     

2 Unknown

 

          COMPLETE BLOOD COUNT [id number]   RDW                 13.7 %    

2 Unknown

 

          COMPLETE BLOOD COUNT [id number]   ABS ARIK             2.18 10e9/L 

012 Unknown

 

          COMPLETE BLOOD COUNT [id number]   ABS LYMPH           3.06 10e9/L 

012 Unknown

 

          COMPLETE BLOOD COUNT [id number]   ABS MONO            0.52 10e9/L 

012 Unknown

 

          COMPLETE BLOOD COUNT [id number]   ABS EOS             0.19 10e9/L 

012 Unknown

 

          COMPLETE BLOOD COUNT [id number]   ABS BASO            0.04 10e9/L 

012 Unknown

 

          COMPLETE BLOOD COUNT [id number]   RDW-SD              44.3 fL   

2 Unknown

 

          LIPID GROUP [id number]     HDL TEST            42 MG/DL  2012 Unknown

 

          LIPID GROUP [id number]     TRIG                156 MG/DL 2012 Unknown

 

          LIPID GROUP [id number]     TEST LDL            80 MG/DL  2012 Unknown

 

          LIPID GROUP [id number]     CHOL                153 MG/DL 2012 Unknown

 

          LIPID GROUP [id number]     RCHOL/HDL           3.64 RATIO 2012 Unknow

n

 

          FREE T4   17241     FREE T4             1.22 NG/DL 2012 Unknown

 

          COMPREHENSIVE METABOLIC 88644     AST                 20 U/L    2012 Unknown

 

          COMPREHENSIVE METABOLIC 78586     ALT                 11 IU/L   2012 Unknown

 

          COMPREHENSIVE METABOLIC 51239     BUN                 19 MG/DL  2012 Unknown

 

          COMPREHENSIVE METABOLIC 88436     ALBUMIN             4.3 GM/DL 2012 Unknown

 

          COMPREHENSIVE METABOLIC 07065     CHLORIDE            109 MMOL/L  Unknown

 

          COMPREHENSIVE METABOLIC 57774     BILI TOT            0.6 MG/DL 2012 Unknown

 

          COMPREHENSIVE METABOLIC 22756     ALK PHOS            84 U/L    2012 Unknown

 

          COMPREHENSIVE METABOLIC 34735     SODIUM              142 MMOL/L  Unknown

 

          COMPREHENSIVE METABOLIC 84916     CREATININE           1.09 MG/DL  Unknown

 

          COMPREHENSIVE METABOLIC 96091     CALCIUM             9.8 MG/DL 2012 Unknown

 

          COMPREHENSIVE METABOLIC 01844     POTASSIUM           4.2 MMOL/L  Unknown

 

          COMPREHENSIVE METABOLIC 30526     PROT TOT            6.4 GM/DL 2012 Unknown

 

          COMPREHENSIVE METABOLIC 99109     Glucose             89 MG/DL  2012 Unknown

 

          COMPREHENSIVE METABOLIC 32659     BICARB              25 MMOL/L 2012 Unknown

 

          COMPREHENSIVE METABOLIC 42791     ANION GAP           8 MEQ/L   2012 Unknown

 

          GFR CALC  5238451   GFR AA              60.0L ML/MIN 2012 Unknow

n

 

          GFR CALC  8990141   GFR NON-AA           49.0L ML/MIN 2012 Unkno

wn

 

          THYROID STIMULATING HORMONE 61835     TSH                 2.450 uIU/ML

 2012 Unknown

 

          COMPREHENSIVE METABOLIC 43998     AST                 22 U/L    2012 Unknown

 

          COMPREHENSIVE METABOLIC 77869     ALT                 14 IU/L   2012 Unknown

 

          COMPREHENSIVE METABOLIC 45373     BUN                 21 MG/DL  2012 Unknown

 

          COMPREHENSIVE METABOLIC 64397     ALBUMIN             4.3 GM/DL 2012 Unknown

 

          COMPREHENSIVE METABOLIC 98079     CHLORIDE            106 MMOL/L  Unknown

 

          COMPREHENSIVE METABOLIC 06871     BILI TOT            0.4 MG/DL 2012 Unknown

 

          COMPREHENSIVE METABOLIC 02062     ALK PHOS            80 U/L    2012 Unknown

 

          COMPREHENSIVE METABOLIC 78222     SODIUM              141 MMOL/L  Unknown

 

          COMPREHENSIVE METABOLIC 07546     CREATININE           1.13 MG/DL  Unknown

 

          COMPREHENSIVE METABOLIC 12992     CALCIUM             9.4 MG/DL 2012 Unknown

 

          COMPREHENSIVE METABOLIC 25335     POTASSIUM           4.3 MMOL/L  Unknown

 

          COMPREHENSIVE METABOLIC 81211     PROT TOT            6.7 GM/DL 2012 Unknown

 

          COMPREHENSIVE METABOLIC 52960     Glucose             98 MG/DL  2012 Unknown

 

          COMPREHENSIVE METABOLIC 21529     BICARB              25 MMOL/L 2012 Unknown

 

          COMPREHENSIVE METABOLIC 81415     ANION GAP           10 MEQ/L  2012 Unknown

 

          LIPID GROUP [id number]     HDL TEST            44 MG/DL  2012 Unknown

 

          LIPID GROUP [id number]     TRIG                164 MG/DL 2012 Unknown

 

          LIPID GROUP [id number]     TEST LDL            98 MG/DL  2012 Unknown

 

          LIPID GROUP [id number]     CHOL                175 MG/DL 2012 Unknown

 

          LIPID GROUP [id number]     RCHOL/HDL           3.98 RATIO 2012 Unknow

n

 

          COMPLETE BLOOD COUNT 24896     WBC                 6.7 10e9/L 20

12 Unknown

 

          COMPLETE BLOOD COUNT 04714     RBC                 4.36 10e12/L 2012 Unknown

 

          COMPLETE BLOOD COUNT 14219     HGB                 12.9 g/dL 

2 Unknown

 

          COMPLETE BLOOD COUNT 41511     HCT DET             39.4 %    

2 Unknown

 

          COMPLETE BLOOD COUNT 74919     MCV                 90.4 fL   

2 Unknown

 

          COMPLETE BLOOD COUNT 72496     MCH                 29.6 pg   

2 Unknown

 

          COMPLETE BLOOD COUNT 18081     MCHC                32.7 g/dL 

2 Unknown

 

          COMPLETE BLOOD COUNT 92951     PLT                 184 10e9/L 20

12 Unknown

 

          COMPLETE BLOOD COUNT 70236     MPV                 10.9 fL   

2 Unknown

 

          COMPLETE BLOOD COUNT 83124     ARIK %               41.5 %    

2 Unknown

 

          COMPLETE BLOOD COUNT 06386     LY %                45.7 %    

2 Unknown

 

          COMPLETE BLOOD COUNT 33339     MON %               9.4 %     

2 Unknown

 

          COMPLETE BLOOD COUNT 53891     EOS  %              3.0 %     

2 Unknown

 

          COMPLETE BLOOD COUNT 93127     BASO %              0.4 %     

2 Unknown

 

          COMPLETE BLOOD COUNT 90304     RDW                 13.2 %    

2 Unknown

 

          COMPLETE BLOOD COUNT 22235     ABS ARIK             2.78 10e9/L 

012 Unknown

 

          COMPLETE BLOOD COUNT 83317     ABS LYMPH           3.06 10e9/L 

012 Unknown

 

          COMPLETE BLOOD COUNT 37270     ABS MONO            0.63 10e9/L 

012 Unknown

 

          COMPLETE BLOOD COUNT 05967     ABS EOS             0.20 10e9/L 

012 Unknown

 

          COMPLETE BLOOD COUNT 08065     ABS BASO            0.03 10e9/L 

012 Unknown

 

          COMPLETE BLOOD COUNT 65235     RDW-SD              42.3 fL   

2 Unknown

 

          GFR CALC  1350391   GFR AA              57.0L ML/MIN 2012 Unknow

n

 

          GFR CALC  6463740   GFR NON-AA           47.0L ML/MIN 2012 Unkno

wn

 

          THYROID STIMULATING HORMONE 25237     TSH                 2.663 uIU/ML

 2012 Unknown

 

          FREE T4   54522     FREE T4             1.15 NG/DL 2012 Unknown

 

          THYROID STIMULATING HORMONE 65822     TSH                 1.908 uIU/ML

 2011 Unknown

 

          COMPLETE BLOOD COUNT 19360     WBC                 6.4 10e9/L 20

11 Unknown

 

          COMPLETE BLOOD COUNT 06368     RBC                 3.92 10e12/L 2011 Unknown

 

          COMPLETE BLOOD COUNT 71484     HGB                 11.8 g/dL 

1 Unknown

 

          COMPLETE BLOOD COUNT 71292     HCT DET             36.0 %    

1 Unknown

 

          COMPLETE BLOOD COUNT 76356     MCV                 91.8 fL   

1 Unknown

 

          COMPLETE BLOOD COUNT 00762     MCH                 30.1 pg   

1 Unknown

 

          COMPLETE BLOOD COUNT 48754     MCHC                32.8 g/dL 

1 Unknown

 

          COMPLETE BLOOD COUNT 66427     PLT                 176 10e9/L 20

11 Unknown

 

          COMPLETE BLOOD COUNT 81264     MPV                 11.4 fL   

1 Unknown

 

          COMPLETE BLOOD COUNT 46313     ARIK %               50.4 %    

1 Unknown

 

          COMPLETE BLOOD COUNT 52060     LY %                35.5 %    

1 Unknown

 

          COMPLETE BLOOD COUNT 48322     MON %               10.2 %    

1 Unknown

 

          COMPLETE BLOOD COUNT 01582     EOS  %              3.3 %     

1 Unknown

 

          COMPLETE BLOOD COUNT 49992     BASO %              0.6 %     

1 Unknown

 

          COMPLETE BLOOD COUNT 23458     RDW                 13.7 %    

1 Unknown

 

          COMPLETE BLOOD COUNT 04377     ABS ARIK             3.23 10e9/L 

011 Unknown

 

          COMPLETE BLOOD COUNT 41800     ABS LYMPH           2.27 10e9/L 

011 Unknown

 

          COMPLETE BLOOD COUNT 51850     ABS MONO            0.65 10e9/L 

011 Unknown

 

          COMPLETE BLOOD COUNT 06586     ABS EOS             0.21 10e9/L 

011 Unknown

 

          COMPLETE BLOOD COUNT 99824     ABS BASO            0.04 10e9/L 

011 Unknown

 

          COMPLETE BLOOD COUNT 17394     RDW-SD              45.3 fL   

1 Unknown

 

          GFR CALC  5892893   GFR AA              >60 ML/MIN 2011 Unknown

 

          GFR CALC  6963954   GFR NON-AA           53.0L ML/MIN 2011 Unkno

wn

 

          FREE T4   31497     FREE T4             1.20 NG/DL 2011 Unknown

 

          COMPREHENSIVE METABOLIC 53338     AST                 17 U/L    2011 Unknown

 

          COMPREHENSIVE METABOLIC 82293     ALT                 9 IU/L    2011 Unknown

 

          COMPREHENSIVE METABOLIC 48311     BUN                 16 MG/DL  2011 Unknown

 

          COMPREHENSIVE METABOLIC 28367     ALBUMIN             4.0 GM/DL 2011 Unknown

 

          COMPREHENSIVE METABOLIC 58100     CHLORIDE            108 MMOL/L  Unknown

 

          COMPREHENSIVE METABOLIC 03190     BILI TOT            0.5 MG/DL 2011 Unknown

 

          COMPREHENSIVE METABOLIC 25152     ALK PHOS            76 U/L    2011 Unknown

 

          COMPREHENSIVE METABOLIC 89842     SODIUM              139 MMOL/L  Unknown

 

          COMPREHENSIVE METABOLIC 08906     CREATININE           1.02 MG/DL 2011 Unknown

 

          COMPREHENSIVE METABOLIC 86506     CALCIUM             9.2 MG/DL 2011 Unknown

 

          COMPREHENSIVE METABOLIC 60955     POTASSIUM           4.5 MMOL/L  Unknown

 

          COMPREHENSIVE METABOLIC 05347     PROT TOT            6.1 GM/DL 2011 Unknown

 

          COMPREHENSIVE METABOLIC 34896     Glucose             93 MG/DL  2011 Unknown

 

          COMPREHENSIVE METABOLIC 62648     BICARB              26 MMOL/L 2011 Unknown

 

          COMPREHENSIVE METABOLIC 41882     ANION GAP           5 MEQ/L   2011 Unknown

 

          LIPID GROUP 52741     HDL TEST            46 MG/DL  2011 Unknown

 

          LIPID GROUP 39928     TRIG                102 MG/DL 2011 Unknown

 

          LIPID GROUP 52879     TEST LDL            88 MG/DL  2011 Unknown

 

          LIPID GROUP 41133     CHOL                154 MG/DL 2011 Unknown

 

          LIPID GROUP 34972     RCHOL/HDL           3.35 RATIO 2011 Unknow

n







Procedures





                    Procedure           Codes               Date

 

                    ROUTINE VENIPUNCTURE CPT-4: 47627        2019

 

                    LIPID PANEL         CPT-4: 13681        2019

 

                    FLU VACC PRSV FREE INC ANTIG 65 AND OLDER CPT-4: 20549      

  10/22/2019

 

                    FLU VACC PRSV FREE INC ANTIG 65 AND OLDER CPT-4: 93779      

  10/22/2019

 

                    ADMIN INFLUENZA VIRUS VAC CPT-4:         10/22/2019

 

                    COMPREHEN METABOLIC PANEL CPT-4: 63887        10/11/2019

 

                    ROUTINE VENIPUNCTURE CPT-4: 46756        10/11/2019

 

                    ROUTINE VENIPUNCTURE CPT-4: 68132        06/10/2019

 

                    ASSAY THYROID STIM HORMONE CPT-4: 19404        06/10/2019

 

                    COMPREHEN METABOLIC PANEL CPT-4: 07803        06/10/2019

 

                    COMPLETE CBC W/AUTO DIFF WBC CPT-4: 65119        06/10/2019

 

                    URINALYSIS NONAUTO W/O SCOPE CPT-4: 84730        2019

 

                    URINE CULTURE/ COLONY COUNT CPT-4: 94804        2019

 

                    URINE CULTURE/ COLONY COUNT CPT-4: 13093        10/02/2018

 

                    ROUTINE VENIPUNCTURE CPT-4: 06099        2018

 

                    ASSAY OF FREE THYROXINE CPT-4: 39153        2018

 

                    ASSAY THYROID STIM HORMONE CPT-4: 82881        2018

 

                    COMPLETE CBC W/AUTO DIFF WBC CPT-4: 07814        2018

 

                    METABOLIC PANEL TOTAL CA CPT-4: 81847        2018

 

                    FLU VACC PRSV FREE INC ANTIG 65 AND OLDER CPT-4: 59423      

  2018

 

                    ASSAY, GLUCOSE, BLOOD QUANT CPT-4: 23567        2018

 

                    ADMIN INFLUENZA VIRUS VAC CPT-4:         2018

 

                    ROUTINE VENIPUNCTURE CPT-4: 71007        2018

 

                    COMPREHEN METABOLIC PANEL CPT-4: 01124        2018

 

                    COMPLETE CBC W/AUTO DIFF WBC CPT-4: 35991        2018

 

                    A1C HPLC            CPT-4: 52566        2018

 

                    ASSAY OF TROPONIN QUANT CPT-4: 49029        2018

 

                    LIPID PANEL         CPT-4: 75807        2018

 

                    THER/PROPH/DIAG INJ SC/IM CPT-4: 70688        2018

 

                    TRIAMCINOLONE ACET INJ NOS CPT-4:         2018

 

                    URINALYSIS NONAUTO W/O SCOPE CPT-4: 58461        2018

 

                    URINE CULTURE/ COLONY COUNT CPT-4: 83971        2018

 

                    FLU VACC PRSV FREE INC ANTIG 65 AND OLDER CPT-4: 97894      

  10/06/2017

 

                    ADMIN INFLUENZA VIRUS VAC CPT-4:         10/06/2017

 

                    ROUTINE VENIPUNCTURE CPT-4: 28396        2017

 

                    COMPREHEN METABOLIC PANEL CPT-4: 64646        2017

 

                    COMPLETE CBC W/AUTO DIFF WBC CPT-4: 46443        2017

 

                    LIPID PANEL         CPT-4: 76180        2017

 

                    A1C HPLC            CPT-4: 01910        2017

 

                    ASSAY THYROID STIM HORMONE CPT-4: 50391        2017

 

                    ROUTINE VENIPUNCTURE CPT-4: 92863        2017

 

                    ASSAY THYROID STIM HORMONE CPT-4: 67120        2017

 

                    COMPREHEN METABOLIC PANEL CPT-4: 48967        2017

 

                    COMPLETE CBC W/AUTO DIFF WBC CPT-4: 36421        2017

 

                    A1C HPLC            CPT-4: 22989        2017

 

                    FLU VACC PRSV FREE INC ANTIG 65 AND OLDER CPT-4: 56792      

  10/07/2016

 

                    ADMIN INFLUENZA VIRUS VAC CPT-4:         10/07/2016

 

                    ROUTINE VENIPUNCTURE CPT-4: 58715        2016

 

                    ASSAY OF FREE THYROXINE CPT-4: 39539        2016

 

                    ASSAY THYROID STIM HORMONE CPT-4: 92486        2016

 

                    COMPREHEN METABOLIC PANEL CPT-4: 62985        2016

 

                    COMPLETE CBC W/AUTO DIFF WBC CPT-4: 40258        2016

 

                    LIPID PANEL         CPT-4: 49359        2016

 

                    A1C HPLC            CPT-4: 03512        2016

 

                    URINALYSIS NONAUTO W/O SCOPE CPT-4: 46724        2016

 

                    ROUTINE VENIPUNCTURE CPT-4: 44442        2015

 

                    METABOLIC PANEL TOTAL CA CPT-4: 51811        2015

 

                    PRESCRIP TRANSMIT VIA ERX SY CPT-4:         2015

 

                    FLU VACC PRSV FREE INC ANTIG 65 AND OLDER CPT-4: 82363      

  10/16/2015

 

                    ADMIN INFLUENZA VIRUS VAC CPT-4:         10/16/2015

 

                    URINALYSIS NONAUTO W/O SCOPE CPT-4: 83259        09/15/2015

 

                    URINE CULTURE/ COLONY COUNT CPT-4: 50040        09/15/2015

 

                    ROUTINE VENIPUNCTURE CPT-4: 37362        09/10/2015

 

                    ASSAY OF FREE THYROXINE CPT-4: 39251        09/10/2015

 

                    ASSAY THYROID STIM HORMONE CPT-4: 07457        09/10/2015

 

                    COMPREHEN METABOLIC PANEL CPT-4: 00023        09/10/2015

 

                    COMPLETE CBC W/AUTO DIFF WBC CPT-4: 51317        09/10/2015

 

                    LIPID PANEL         CPT-4: 36399        09/10/2015

 

                    A1C HPLC            CPT-4: 12272        09/10/2015

 

                    CERUM REMOVAL       CPT-4: 55358        2015

 

                    PRESCRIP TRANSMIT VIA ERX SY CPT-4:         2015

 

                    FLUZONE, 5ML (Medicare) CPT-4:         10/17/2014

 

                    ADMIN INFLUENZA VIRUS VAC CPT-4:         10/17/2014

 

                    PRESCRIP TRANSMIT VIA ERX SY CPT-4:         2014

 

                    PRESCRIP TRANSMIT VIA ERX SY CPT-4:         2014

 

                    PRESCRIP TRANSMIT VIA ERX SY CPT-4:         2014

 

                    ROUTINE VENIPUNCTURE CPT-4: 06022        2014

 

                    ASSAY OF FREE THYROXINE CPT-4: 04775        2014

 

                    ASSAY THYROID STIM HORMONE CPT-4: 61606        2014

 

                    COMPREHEN METABOLIC PANEL CPT-4: 82285        2014

 

                    COMPLETE CBC W/AUTO DIFF WBC CPT-4: 26842        2014

 

                    LIPID PANEL         CPT-4: 17925        2014

 

                    A1C HPLC            CPT-4: 11937        2014

 

                    VITAMIN B 12 FOLIC ACID CPT-4: 69464|28750  2014

 

                    PRESCRIP TRANSMIT VIA ERX SY CPT-4:         2014

 

                    PRESCRIP TRANSMIT VIA ERX SY CPT-4:         10/15/2013

 

                    FLUZONE, 5ML (Medicare) CPT-4:         2013

 

                    ADMIN INFLUENZA VIRUS VAC CPT-4:         2013

 

                    PRESCRIP TRANSMIT VIA ERX SY CPT-4:         2013

 

                    PRESCRIP TRANSMIT VIA ERX SY CPT-4:         05/10/2013

 

                    ROUTINE VENIPUNCTURE CPT-4: 06753        2012

 

                    ASSAY OF FREE THYROXINE CPT-4: 01023        2012

 

                    ASSAY THYROID STIM HORMONE CPT-4: 13727        2012

 

                    COMPREHEN METABOLIC PANEL CPT-4: 19172        2012

 

                    COMPLETE CBC W/AUTO DIFF WBC CPT-4: 88744        2012

 

                    LIPID PANEL         CPT-4: 99200        2012

 

                    A1C GLYCOSYLATED HEMOGLOBIN TEST CPT-4: 01023        

012

 

                    CERUM REMOVAL       CPT-4: 66400        2012

 

                    PRESCRIP TRANSMIT VIA ERX SY CPT-4:         2012

 

                    PRESCRIP TRANSMIT VIA ERX SY CPT-4:         10/10/2012

 

                    FLUZONE, 5ML (Medicare) CPT-4:         2012

 

                    ADMIN INFLUENZA VIRUS VAC CPT-4:         2012

 

                    ASSAY, GLUCOSE, BLOOD QUANT CPT-4: 83602        2012

 

                    URINALYSIS NONAUTO W/O SCOPE CPT-4: 84500        2012

 

                    URINE CULTURE/ COLONY COUNT CPT-4: 52918        2012

 

                    ROUTINE VENIPUNCTURE CPT-4: 67423        2012

 

                    ASSAY OF FREE THYROXINE CPT-4: 37982        2012

 

                    ASSAY THYROID STIM HORMONE CPT-4: 05855        2012

 

                    COMPREHEN METABOLIC PANEL CPT-4: 16837        2012

 

                    COMPLETE CBC W/AUTO DIFF WBC CPT-4: 61214        2012

 

                    LIPID PANEL         CPT-4: 39358        2012

 

                    ASSAY OF INSULIN    CPT-4: 90920        2012

 

                    A1C GLYCOSYLATED HEMOGLOBIN TEST CPT-4: 21755        

012

 

                    DRAIN/INJECT JOINT/BURSA CPT-4: 19669        2012

 

                    METHYLPREDNISOLONE 40 MG INJ CPT-4:         2012

 

                    TRIAMCINOLONE ACET INJ NOS CPT-4:         2012

 

                    PRESCRIP TRANSMIT VIA ERX SY CPT-4:         2012

 

                    PRESCRIP TRANSMIT VIA ERX SY CPT-4:         2012

 

                    METHYLPREDNISOLONE 40 MG INJ CPT-4:         2012

 

                    DRAIN/INJECT JOINT/BURSA CPT-4: 30889        2012

 

                    TRIAMCINOLONE ACET INJ NOS CPT-4:         2012

 

                    PRESCRIP TRANSMIT VIA ERX SY CPT-4:         2012

 

                    ROUTINE VENIPUNCTURE CPT-4: 57241        2012

 

                    ASSAY OF FREE THYROXINE CPT-4: 83726        2012

 

                    ASSAY THYROID STIM HORMONE CPT-4: 67434        2012

 

                    COMPREHEN METABOLIC PANEL CPT-4: 00373        2012

 

                    COMPLETE CBC W/AUTO DIFF WBC CPT-4: 82285        2012

 

                    LIPID PANEL         CPT-4: 02436        2012

 

                    PRESCRIP TRANSMIT VIA ERX SY CPT-4:         2012

 

                    CERUM REMOVAL       CPT-4: 47213        2011

 

                    PRESCRIP TRANSMIT VIA ERX SY CPT-4:         2011

 

                    FLUZONE, 5ML (Medicare) CPT-4:         10/05/2011

 

                    ADMIN INFLUENZA VIRUS VAC CPT-4:         10/05/2011

 

                    PRESCRIP TRANSMIT VIA ERX SY CPT-4:         2011

 

                    URINALYSIS NONAUTO W/O SCOPE CPT-4: 95539        2011

 

                    URINE CULTURE/ COLONY COUNT CPT-4: 50723        2011

 

                    CUR TOBACCO NON-USER CPT-4:         2011

 

                    ROUTINE VENIPUNCTURE CPT-4: 44136        2011

 

                    COMPLETE CBC W/AUTO DIFF WBC CPT-4: 02408        2011

 

                    COMPREHEN METABOLIC PANEL CPT-4: 45713        2011

 

                    LIPID PANEL         CPT-4: 24094        2011

 

                    ASSAY THYROID STIM HORMONE CPT-4: 45321        2011

 

                    ASSAY OF FREE THYROXINE CPT-4: 47369        2011

 

                    PRESCRIP TRANSMIT VIA ERX SY CPT-4:         2011

 

                    INJ TRIGGER POINT 1/2 MUSCL CPT-4: 44464        2011

 

                    TRIAMCINOLONE ACET INJ NOS CPT-4:         2011

 

                    METHYLPREDNISOLONE 40 MG INJ CPT-4:         2011

 

                    THER/PROPH/DIAG INJ SC/IM CPT-4: 20641        2011

 

                    KETOROLAC TROMETHAMINE INJ CPT-4:         2011

 

                    PRESCRIP TRANSMIT VIA ERX SY CPT-4:         2010

 

                    FLU VACCINE 3 YRS & > IM UP 64 CPT-4: 17211        10/06/201

0

 

                    ADMIN INFLUENZA VIRUS VAC CPT-4:         10/06/2010

 

                    URINALYSIS NONAUTO W/O SCOPE CPT-4: 17156        2010

 

                    URINE CULTURE/ COLONY COUNT CPT-4: 76071        2010

 

                    PRESCRIP TRANSMIT VIA ERX SY CPT-4:         2010

 

                    THER/PROPH/DIAG INJ SC/IM CPT-4: 78377        2010

 

                    VITAMIN B12 INJECTION CPT-4:         2010

 

                    THER/PROPH/DIAG INJ SC/IM CPT-4: 40773        2010

 

                    VITAMIN B12 INJECTION CPT-4:         2010

 

                    ROUTINE VENIPUNCTURE CPT-4: 65141        2010







Vital Signs





                          Date                      Vital

 

                    2020          Blood Pressure 1: 144/82 Code: 8480-6 

art Rate 1: 56 bpm

 

             2019   Blood Pressure 1: 140/70 Code: 8480-6 Heart Rate 1: 57

 bpm SpO2: 99%    

Temperature: 35.9 (C) / 96.6 (F)        Weight: 215 lbs 

 

                06/10/2019      Blood Pressure 1: 144/70 Code: 8480-6 Heart Rate

 1: 60 bpm 

Respiratory Rate: 20 bpm SpO2: 95%           Temperature: 36.4 (C) / 97.6 (F) We

ight: 214 

lbs 

 

                2019      Blood Pressure 1: 128/78 Code: 8480-6 Heart Rate

 1: 56 bpm 

Respiratory Rate: 20 bpm SpO2: 98%           Temperature: 36.3 (C) / 97.4 (F) We

ight: 213 

lbs 

 

                2018      Blood Pressure 1: 142/60 Code: 8480-6 BMI: 38.2 

Code: 06312-6 Heart 

Rate 1: 48 bpm  Height: 5'2"    Respiratory Rate: 20 bpm SpO2: 98%       Tempera

ture: 36.7

(C) / 98.1 (F)                          Weight: 212 lbs 

 

                2018      Blood Pressure 1: 142/64 Code: 8480-6 BMI: 38.5 

Code: 18358-3 Heart 

Rate 1: 52 bpm  Height: 5'2"    Respiratory Rate: 22 bpm SpO2: 96%       Tempera

ture: 36.1

(C) / 96.9 (F)                          Weight: 214 lbs 

 

                10/02/2018      Blood Pressure 1: 124/80 Code: 8480-6 BMI: 38.3 

Code: 02239-7 Heart 

Rate 1: 68 bpm      Height: 5'2"        Respiratory Rate: 20 bpm Temperature: 36

.3 (C) / 

97.4 (F)                                Weight: 213 lbs 

 

                2018      Blood Pressure 1: 132/78 Code: 8480-6 BMI: 37.6 

Code: 44108-6 Heart 

Rate 1: 68 bpm  Height: 5'2"    Respiratory Rate: 20 bpm SpO2: 97%       Tempera

ture: 36.8

(C) / 98.2 (F)                          Weight: 209 lbs 

 

                2018      Blood Pressure 1: 150/76 Code: 8480-6 BMI: 38.5 

Code: 46170-6 Heart 

Rate 1: 64 bpm  Height: 5'2"    Respiratory Rate: 20 bpm SpO2: 97%       Tempera

ture: 36.2

(C) / 97.2 (F)                          Weight: 214 lbs 

 

                2018      Blood Pressure 1: 122/74 Code: 8480-6 BMI: 38.2 

Code: 01095-6 Heart 

Rate 1: 64 bpm  Height: 5'2"    Respiratory Rate: 18 bpm SpO2: 96%       Tempera

ture: 35.8

(C) / 96.4 (F)                          Weight: 212 lbs 

 

                2018      Blood Pressure 1: 124/78 Code: 8480-6 BMI: 37.8 

Code: 45224-3 Heart 

Rate 1: 76 bpm      Height: 5'2"        Respiratory Rate: 20 bpm Temperature: 36

.8 (C) / 

98.3 (F)                                Weight: 210 lbs 

 

                2018      Blood Pressure 1: 136/70 Code: 8480-6 BMI: 38.0 

Code: 91783-3 Heart 

Rate 1: 68 bpm  Height: 5'2"    Respiratory Rate: 20 bpm SpO2: 97%       Tempera

ture: 36.8

(C) / 98.2 (F)                          Weight: 211 lbs 

 

                2018      Blood Pressure 1: 140/65 Code: 8480-6 Heart Rate

 1: 75 bpm 

Respiratory Rate: 24 bpm SpO2: 95%           Temperature: 37.0 (C) / 98.6 (F) We

ight: 211 

lbs 

 

                2018      Blood Pressure 1: 154/70 Code: 8480-6 BMI: 37.6 

Code: 88836-4 Heart 

Rate 1: 76 bpm  Height: 5'2"    Respiratory Rate: 20 bpm SpO2: 98%       Tempera

ture: 36.9

(C) / 98.5 (F)                          Weight: 209 lbs 

 

                2017      Blood Pressure 1: 156/70 Code: 8480-6 BMI: 37.1 

Code: 77205-0 Heart 

Rate 1: 72 bpm  Height: 5'2"    Respiratory Rate: 20 bpm SpO2: 97%       Tempera

ture: 37.0

(C) / 98.6 (F)                          Weight: 206 lbs 

 

                2017      Blood Pressure 1: 152/78 Code: 8480-6 BMI: 36.8 

Code: 39405-9 Heart 

Rate 1: 78 bpm  Height: 5'2"    Respiratory Rate: 20 bpm SpO2: 98%       Tempera

ture: 36.1

(C) / 97.0 (F)                          Weight: 204 lbs 

 

                2017      Blood Pressure 1: 142/70 Code: 8480-6 BMI: 36.9 

Code: 16063-9 Heart 

Rate 1: 64 bpm  Height: 5'2"    Respiratory Rate: 20 bpm SpO2: 96%       Tempera

ture: 36.5

(C) / 97.7 (F)                          Weight: 205 lbs 

 

                2017      Blood Pressure 1: 142/68 Code: 8480-6 Heart Rate

 1: 88 bpm 

Respiratory Rate: 18 bpm SpO2: 98%           Temperature: 36.3 (C) / 97.3 (F) We

ight: 209 

lbs 

 

                2016      Blood Pressure 1: 130/78 Code: 8480-6 Heart Rate

 1: 68 bpm 

Respiratory Rate: 20 bpm SpO2: 95%           Temperature: 36.4 (C) / 97.6 (F) We

ight: 212 

lbs 

 

                2016      Blood Pressure 1: 122/64 Code: 8480-6 BMI: 39.1 

Code: 45863-7 Heart 

Rate 1: 76 bpm      Height: 5'2"        Respiratory Rate: 20 bpm Temperature: 36

.8 (C) / 

98.2 (F)                                Weight: 217 lbs 

 

                2016      Blood Pressure 1: 144/70 Code: 8480-6 BMI: 39.4 

Code: 12152-4 Heart 

Rate 1: 76 bpm      Height: 5'2"        Respiratory Rate: 20 bpm Temperature: 36

.6 (C) / 

97.9 (F)                                Weight: 219 lbs 

 

                2015      Blood Pressure 1: 152/60 Code: 8480-6 BMI: 39.6 

Code: 59640-7 Heart 

Rate 1: 84 bpm      Height: 5'2"        Respiratory Rate: 20 bpm Temperature: 37

.0 (C) / 

98.6 (F)                                Weight: 220 lbs 

 

                2015      Blood Pressure 1: 146/76 Code: 8480-6 BMI: 39.8 

Code: 52542-9 Heart 

Rate 1: 88 bpm      Height: 5'2"        Respiratory Rate: 20 bpm Temperature: 37

.0 (C) / 

98.6 (F)                                Weight: 221 lbs 

 

                2015      Blood Pressure 1: 132/70 Code: 8480-6 BMI: 39.1 

Code: 92220-2 Heart 

Rate 1: 88 bpm      Height: 5'2"        Respiratory Rate: 20 bpm Temperature: 36

.4 (C) / 

97.6 (F)                                Weight: 217 lbs 

 

                2015      Blood Pressure 1: 132/66 Code: 8480-6 BMI: 39.9 

Code: 13521-5 Heart 

Rate 1: 72 bpm      Height: 5'2"        Respiratory Rate: 20 bpm Temperature: 36

.9 (C) / 

98.4 (F)                                Weight: 218 lbs 

 

                2015      Blood Pressure 1: 136/80 Code: 8480-6 Heart Rate

 1: 76 bpm 

Respiratory Rate: 20 bpm  Temperature: 36.7 (C) / 98.0 (F) Weight: 224 lbs 

 

                2015      Blood Pressure 1: 134/78 Code: 8480-6 BMI: 39.7 

Code: 27501-0 Heart 

Rate 1: 84 bpm      Height: 5'2"        Respiratory Rate: 20 bpm Temperature: 36

.7 (C) / 

98.0 (F)                                Weight: 217 lbs 

 

                2014      Blood Pressure 1: 146/78 Code: 8480-6 BMI: 39.5 

Code: 80290-2 Heart 

Rate 1: 82 bpm      Height: 5'2"        Respiratory Rate: 18 bpm Temperature: 35

.6 (C) / 

96.1 (F)                                Weight: 216 lbs 

 

                2014      Blood Pressure 1: 134/70 Code: 8480-6 BMI: 37.9 

Code: 37204-8 Heart 

Rate 1: 80 bpm      Height: 5'3"        Respiratory Rate: 20 bpm Temperature: 36

.8 (C) / 

98.2 (F)                                Weight: 214 lbs 

 

                2014      Blood Pressure 1: 132/70 Code: 8480-6 BMI: 37.6 

Code: 05226-6 Heart 

Rate 1: 80 bpm      Height: 5'3"        Respiratory Rate: 20 bpm Temperature: 36

.8 (C) / 

98.3 (F)                                Weight: 212 lbs 

 

                2014      Blood Pressure 1: 116/74 Code: 8480-6 Heart Rate

 1: 68 bpm 

Respiratory Rate: 20 bpm  Temperature: 36.2 (C) / 97.1 (F) Weight: 212 lbs 

 

                10/15/2013      Blood Pressure 1: 132/82 Code: 8480-6 BMI: 37.4 

Code: 29681-1 Heart 

Rate 1: 76 bpm      Height: 5'3"        Respiratory Rate: 20 bpm Temperature: 36

.7 (C) / 

98.0 (F)                                Weight: 211 lbs 

 

                    2013          Blood Pressure 1: 130/76 Code: 8480-6 He

art Rate 1: 78 bpm

 

                2013      Blood Pressure 1: 140/82 Code: 8480-6 BMI: 36.8 

Code: 78649-3 Heart 

Rate 1: 66 bpm      Height: 5'3"        Respiratory Rate: 20 bpm Temperature: 36

.1 (C) / 

96.9 (F)                                Weight: 208 lbs 

 

                2013      Blood Pressure 1: 138/80 Code: 8480-6 BMI: 36.4 

Code: 81206-4 Heart 

Rate 1: 72 bpm      Height: 5'4"        Respiratory Rate: 20 bpm Temperature: 36

.7 (C) / 

98.0 (F)                                Weight: 212 lbs 

 

                2013      Blood Pressure 1: 124/70 Code: 8480-6 BMI: 36.9 

Code: 37381-2 Heart 

Rate 1: 60 bpm      Height: 5'4"        Temperature: 36.1 (C) / 97.0 (F) Weight:

 215 lbs 

 

                05/10/2013      Blood Pressure 1: 132/86 Code: 8480-6 BMI: 36.9 

Code: 11691-0 Heart 

Rate 1: 76 bpm      Height: 5'4"        Respiratory Rate: 20 bpm Temperature: 36

.8 (C) / 

98.2 (F)                                Weight: 215 lbs 

 

                2013      Blood Pressure 1: 134/82 Code: 8480-6 BMI: 36.6 

Code: 75061-9 Heart 

Rate 1: 72 bpm      Height: 5'4"        Respiratory Rate: 20 bpm Temperature: 36

.3 (C) / 

97.4 (F)                                Weight: 213 lbs 

 

                2012      Blood Pressure 1: 142/80 Code: 8480-6 BMI: 37.1 

Code: 06993-0 Heart 

Rate 1: 76 bpm      Height: 5'4"        Respiratory Rate: 20 bpm Temperature: 36

.8 (C) / 

98.3 (F)                                Weight: 216 lbs 

 

                10/23/2012      Blood Pressure 1: 128/68 Code: 8480-6 BMI: 37.6 

Code: 81933-2 Heart 

Rate 1: 72 bpm      Height: 5'4"        Respiratory Rate: 20 bpm Temperature: 36

.6 (C) / 

97.9 (F)                                Weight: 219 lbs 

 

                10/10/2012      Blood Pressure 1: 122/70 Code: 8480-6 Heart Rate

 1: 76 bpm 

Respiratory Rate: 20 bpm  Temperature: 36.7 (C) / 98.1 (F) Weight: 218 lbs 

 

                    2012          Blood Pressure 1: 132/80 Code: 8480-6 He

art Rate 1: 84 bpm

 

                2012      Blood Pressure 1: 128/78 Code: 8480-6 BMI: 38.1 

Code: 85703-5 Heart 

Rate 1: 84 bpm      Height: 5'4"        Respiratory Rate: 20 bpm Temperature: 36

.9 (C) / 

98.4 (F)                                Weight: 222 lbs 

 

                2012      Blood Pressure 1: 138/80 Code: 8480-6 BMI: 37.9 

Code: 53414-1 Heart 

Rate 1: 74 bpm      Height: 5'4"        Temperature: 36.1 (C) / 97.0 (F) Weight:

 221 lbs 

 

                2012      Blood Pressure 1: 126/78 Code: 8480-6 BMI: 38.4 

Code: 51323-8 Heart 

Rate 1: 72 bpm      Height: 5'4"        Respiratory Rate: 20 bpm Temperature: 36

.7 (C) / 

98.0 (F)                                Weight: 224 lbs 

 

                2012      Blood Pressure 1: 138/72 Code: 8480-6 BMI: 38.4 

Code: 23646-4 Heart 

Rate 1: 72 bpm      Height: 5'4"        Respiratory Rate: 20 bpm Temperature: 36

.6 (C) / 

97.9 (F)                                Weight: 224 lbs 

 

                2012      Blood Pressure 1: 122/78 Code: 8480-6 BMI: 38.8 

Code: 65095-1 Heart 

Rate 1: 88 bpm      Height: 5'4"        Respiratory Rate: 20 bpm Temperature: 36

.6 (C) / 

97.8 (F)                                Weight: 226 lbs 

 

                2012      Blood Pressure 1: 116/60 Code: 8480-6 BMI: 38.1 

Code: 34146-5 Heart 

Rate 1: 92 bpm      Height: 5'4"        Respiratory Rate: 20 bpm Temperature: 36

.8 (C) / 

98.2 (F)                                Weight: 222 lbs 

 

                2011      Blood Pressure 1: 118/62 Code: 8480-6 BMI: 37.9 

Code: 64890-5 Heart 

Rate 1: 80 bpm      Height: 5'4"        Temperature: 36.5 (C) / 97.7 (F) Weight:

 221 lbs 

 

                2011      Blood Pressure 1: 132/70 Code: 8480-6 Heart Rate

 1: 84 bpm 

Respiratory Rate: 20 bpm  Temperature: 36.7 (C) / 98.0 (F) Weight: 221 lbs 

 

                2011      Blood Pressure 1: 114/72 Code: 8480-6 BMI: 37.6 

Code: 61553-8 Heart 

Rate 1: 76 bpm      Height: 5'4"        Respiratory Rate: 20 bpm Temperature: 36

.8 (C) / 

98.3 (F)                                Weight: 219 lbs 

 

                    2011          Blood Pressure 1: 136/76 Code: 8480-6 Te

mperature: 36.0 (C) / 96.8 

(F)                                     Weight: 219 lbs 8 oz

 

                2011      Blood Pressure 1: 128/80 Code: 8480-6 Heart Rate

 1: 72 bpm 

Temperature: 36.3 (C) / 97.4 (F)        Weight: 218 lbs 

 

                2011      Blood Pressure 1: 126/70 Code: 8480-6 Heart Rate

 1: 72 bpm 

Temperature: 36.7 (C) / 98.0 (F)

 

                    2010          Blood Pressure 1: 126/78 Code: 8480-6 Te

mperature: 36.2 (C) / 97.1 

(F)                                     Weight: 217 lbs 8 oz

 

                2010      Blood Pressure 1: 116/70 Code: 8480-6 Heart Rate

 1: 72 bpm 

Temperature: 36.4 (C) / 97.6 (F)        Weight: 222 lbs 

 

                2010      Blood Pressure 1: 114/78 Code: 8480-6 Heart Rate

 1: 72 bpm 

Temperature: 37.1 (C) / 98.8 (F)        Weight: 225 lbs 

 

                2010      Blood Pressure 1: 128/78 Code: 8480-6 Heart Rate

 1: 76 bpm 

Temperature: 36.6 (C) / 97.8 (F)        Weight: 224 lbs 

 

                2010      Blood Pressure 1: 116/78 Code: 8480-6 Heart Rate

 1: 80 bpm 

Temperature: 36.4 (C) / 97.6 (F)        Weight: 226 lbs 

 

                2010      Blood Pressure 1: 120/70 Code: 8480-6 BMI: 38.3 

Code: 65939-1 Heart 

Rate 1: 88 bpm      Height: 5'5"        Temperature: 36.4 (C) / 97.6 (F) Weight:

 230 lbs 







Functional Status

No Functional Status data



Reason For Visit





                    Reason For Visit    Effective Dates     Notes

 

                    blood pressure check 2020           

 

                    lab draw            2019           

 

                    lab draw            10/11/2019           

 

                    hearing loss        2019          left ear

 

                    follow up           06/10/2019           

 

                    follow up           2019          Patient has upcoming

 appointment with Dr Claire and carotid 

dopplers tomorrow

 

                    follow up           2018          Patient had heart ca

th on 10-30-18 and one of the bypass 

veins in spasming

 

                    follow up           2018          4 Week

 

                    follow up           10/02/2018           

 

                    follow up           2018           

 

                    high blood pressure 2018          Dr Claire has ordered

 holter monitor and 

hydralazine 50mg PRN

 

                    dizziness           2018          On  morning pa

tient woke up and went to the bathroom 

and after lying down became very dizzy. Patient has hx of vertigo so didn't 
think anything of it. She usually just has them intermittently, but had more 
that day. While at [employer] began to feel very ill and became very shaky. She got 
home and sat in the recliner and had the jittery/nervous feeling. Later that 
night it finally resolved. Patient feels like she had a spell like this around 
the  and thought it was due to extreme heat exhaustion.

 

                    follow up           2018           

 

                    back pain           2018           

 

                    otalgia             2018           

 

                    back pain           2018           

 

                    injection(s)        10/06/2017          flu shot

 

                    lab draw            2017           

 

                    follow up           2017           

 

                    pelvic pain         2017          Patient states pain 

started in May with a "Constant Ache"

to left inguinal area. Patient states at that time pain was intermittent. Then, 
approximately 2nd week  of  pain worsened and radiates to left low back 
area.

 

                    lab draw            2017           

 

                    hyperglycemia       2017           

 

                    cough               2017           

 

                    otalgia             2016           

 

                    injection(s)        10/07/2016          Influenza

 

                    lab draw            2016           

 

                    constipation        2016           

 

                    flank pain          2016           

 

                    follow up           2015          3mo fwup

 

                    injection(s)        10/16/2015          Influenza

 

                    acute renal failure 09/15/2015           

 

                    lab draw            09/10/2015           

 

                    fatigue             2015           

 

                    otalgia             2015           

 

                    pain, limb          2015           

 

                    follow up           2015          [facility] fwup

 

                    high blood pressure 2015           

 

                    injection(s)        10/17/2014          flu shot

 

                    sinusitis           2014           

 

                    high blood pressure 2014           

 

                    cough               2014          Was panfilo at Dr Tyree teixeira's office so he started he on amlodipine 

10mg but seems to be dragging her down. Patient decreased to 1/2 tablet this 
last week

 

                    lab draw            2014           

 

                    cough               2014           

 

                    cough               10/15/2013           

 

                    high blood pressure 2013           

 

                    follow up           2013          ER

 

                    dizziness           2013           

 

                    follow up           2013           

 

                    otalgia             05/10/2013           

 

                    breast complaint    2013           

 

                    lab draw            2012           

 

                    follow up           2012          2mo fwup

 

                    follow up           10/23/2012          2wk fwup

 

                    gas and bloating    10/10/2012          Dr Claire has her mon

itoring because was high in his 

office at last visit

 

                    injection(s)        2012           

 

                    dizziness           2012           

 

                    dizziness           2012           

 

                    lab draw            2012           

 

                    muscle weakness     2012          concerns of simvasta

tin with fatigue and muscle 

weakness

 

                    leg pain/sciatica   2012           

 

                    hip pain            2012           

 

                    back pain           2012          has tried ice pack, 

muscle relaxers, and moist heat

 

                    back pain           2012           

 

                    fatigue             2012          in hands/feet

 

                    otalgia             2011           

 

                    follow up           2011          2wk fwup

 

                    back pain           2011          thinks ulcer may hav

e returned

 

                    lab draw            2011           

 

                    dizziness           2011          Left ear and facial 

pain, right ear pain 

 

                    follow up           2011          1wk fwup

 

                    hip pain            2011          feels very draggy, f

atigue, BP 80/63, normally running 

100's/60's

 

                    chills              2010           

 

                    injection(s)        10/06/2010          flu shot

 

                    follow up           2010          6wk fwup, had HH rep

aired and is starting to feel better, 

started on reglan 10mg tid

 

                    follow up           2010          hosp fwup

 

                    follow up           2010          1mo fwup, saw Dr Danna du and hasn't gave cardiac clearance 

yet for HH repair, did have chemical stress test yesterday and waiting on 
results

 

                    follow up           2010          from EGD/colonoscopy

--has Hiatal Hernia and wants to do 

repair

 

                    follow up           2010           







Encounters





             Encounter    Performer    Location     Codes        Date

 

                                        () NURSE/OUTPATIENT VISIT EST

Diagnosis: Mixed hyperlipidemia[ICD10: E78.2] Akanksha SPENCERER DO [employer]            CPT-4: 96739              2019

 

                                        ([id number]) NURSE/OUTPATIENT VISIT EST

Diagnosis: FLU VACCINE[ICD10: Z23] Akanksha BAUMAN SAramis OTOOLEND

ER DO 

[employer]                       CPT-4: 04140              10/22/2019

 

                                        ([id number]) NURSE/OUTPATIENT VISIT EST

Diagnosis: Chronic kidney disease, stage 1[ICD10: N18.1] Akanksha SPENCERER DO [employer] CPT-4: 66264              10/11/2019

 

                                        ([id number]) OFFICE/OUTPATIENT VISIT EST

Diagnosis: Acute serous otitis media, left ear[ICD10: H65.02] Nat DRIVER [employer] [facility] CPT-4: 69822              2019

 

                                        ([id number]) OFFICE/OUTPATIENT VISIT EST

Diagnosis: Essential (primary) hypertension[ICD10: I10]

Diagnosis: Coronary atherosclerosis due to calcified coronary lesion[ICD10: 
I25.84]

Diagnosis: Hypoglycemia, unspecified[ICD10: E16.2]

Diagnosis: Other fatigue[ICD10: R53.83]

Diagnosis: Urinary tract infection, site not specified[ICD10: N39.0] Akanksha DRIVER [employer] [facility] CPT-4: 01691        06/10/2019

 

                                        ([id number]) OFFICE/OUTPATIENT VISIT EST

Diagnosis: Essential (primary) hypertension[ICD10: I10]

Diagnosis: Gastro-esophageal reflux disease without esophagitis[ICD10: K21.9]

Diagnosis: Urinary tract infection, site not specified[ICD10: N39.0] Akanksha DRIVER [employer] [facility] CPT-4: 10034        2019

 

                                        ([id number]) OFFICE/OUTPATIENT VISIT EST

Diagnosis: Gastro-esophageal reflux disease without esophagitis[ICD10: K21.9]

Diagnosis: Epigastric pain[ICD10: R10.13] Akanksha DRIVER [employer]            CPT-4: 87316              2018

 

                                        ([id number]) OFFICE/OUTPATIENT VISIT EST

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

Diagnosis: Essential (primary) hypertension[ICD10: I10]

Diagnosis: Generalized anxiety disorder[ICD10: F41.1]

Diagnosis: Gastro-esophageal reflux disease without esophagitis[ICD10: K21.9] 

Akanksha DRIVER [employer] [facility] CPT-4: 20994        2018

 

                                        OFFICE/OUTPATIENT VISIT EST

Diagnosis: Gastro-esophageal reflux disease without esophagitis[ICD10: K21.9]

Diagnosis: Palpitations[ICD10: R00.2]

Diagnosis: Urinary tract infection, site not specified[ICD10: N39.0]

Diagnosis: Generalized anxiety disorder[ICD10: F41.1] Akanksha DRIVER [employer] [facility] CPT-4: 46559              10/02/2018

 

                                        ([id number]) NURSE/OUTPATIENT VISIT EST

Diagnosis: Coronary atherosclerosis due to calcified coronary lesion[ICD10: 
I25.84]

Diagnosis: Hypoglycemia, unspecified[ICD10: E16.2]

Diagnosis: Dizziness and giddiness[ICD10: R42]

Diagnosis: Occlusion and stenosis of bilateral carotid arteries[ICD10: I65.23] 

Akanksha DRIVER [employer] [facility] CPT-4: 06158        2018

 

                                        OFFICE/OUTPATIENT VISIT EST

Diagnosis: Epigastric pain[ICD10: R10.13]

Diagnosis: Generalized anxiety disorder[ICD10: F41.1]

Diagnosis: FLU VACCINE[ICD10: Z23] Akanksha KEY [employer] 

[facility]                       CPT-4: 70038              2018

 

                                        ([id number]) OFFICE/OUTPATIENT VISIT EST

Diagnosis: Essential (primary) hypertension[ICD10: I10]

Diagnosis: Hypoglycemia, unspecified[ICD10: E16.2]

Diagnosis: Gastro-esophageal reflux disease without esophagitis[ICD10: K21.9] 

Akanksha DRIVER [facility] CPT-4: 13668        2018

 

                                        ([id number]) OFFICE/OUTPATIENT VISIT EST

Diagnosis: Dizziness and giddiness[ICD10: R42] Nat DRIVER [employer] [facility]            CPT-4: 04732              2018

 

                                        ([id number]) OFFICE/OUTPATIENT VISIT EST

Diagnosis: Cervicalgia[ICD10: M54.2]

Diagnosis: Other spondylosis with radiculopathy, cervical region[ICD10: M47.22] 

Akanksha DRIVER [employer] [facility] CPT-4: 74637        2018

 

                                        (07139) OFFICE/OUTPATIENT VISIT EST

Diagnosis: Hypoglycemia, unspecified[ICD10: E16.2]

Diagnosis: Other spondylosis with radiculopathy, cervical region[ICD10: M47.22]

Diagnosis: Vertigo of central origin, bilateral[ICD10: H81.43]

Diagnosis: Cervicocranial syndrome[ICD10: M53.0] Akanksha KEVINANN MARIE CHEATHAMAramis [facility]         CPT-4: 03301              2018

 

                                        ([id number]) OFFICE/OUTPATIENT VISIT EST

Diagnosis: Benign paroxysmal vertigo, bilateral[ICD10: H81.13]

Diagnosis: Otalgia, bilateral[ICD10: H92.03] Nat Lozoya          NYARAMOS CHEATHAMAramis 

[facility]            CPT-4: 17835              2018

 

                                        ([id number]) OFFICE/OUTPATIENT VISIT EST

Diagnosis: Low back pain[ICD10: M54.5]

Diagnosis: Radiculopathy, lumbosacral region[ICD10: M54.17]

Diagnosis: Left lower quadrant pain[ICD10: R10.32] Akanksha DE LA ROSA

[facility]         CPT-4: 34885              2018

 

                                        ([id number]) OFFICE/OUTPATIENT VISIT EST

Diagnosis: FLU VACCINE[ICD10: Z23] Akanksha BAUMAN SAramis TJ

[facility]                       CPT-4: 29628              10/06/2017

 

                                        ([id number]) OFFICE/OUTPATIENT VISIT EST

Diagnosis: Mixed hyperlipidemia[ICD10: E78.2]

Diagnosis: Essential (primary) hypertension[ICD10: I10]

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

Diagnosis: Impaired fasting glucose[ICD10: R73.01]

Diagnosis: Other fatigue[ICD10: R53.83] Akanksha BAUMAN SAramis 

[facility]

[facility]                    CPT-4: 38117              2017

 

                                        ([id number]) OFFICE/OUTPATIENT VISIT EST

Diagnosis: Pain in thoracic spine[ICD10: M54.6]

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 

[facility] CPT[facility]: 54950        2017

 

                                        ([id number]) OFFICE/OUTPATIENT VISIT EST

Diagnosis: Left lower quadrant pain[ICD10: R10.32]

Diagnosis: Low back pain[ICD10: M54.5] Akanksha SYKES 

[facility] [facility]                    CPT-4: 35245              2017

 

                                        ([id number]) OFFICE/OUTPATIENT VISIT EST

Diagnosis: Mixed hyperlipidemia[ICD10: E78.2]

Diagnosis: Essential (primary) hypertension[ICD10: I10]

Diagnosis: Hypoglycemia, unspecified[ICD10: E16.2] Akanksha Villa DRIVER [facility]         CPT-4: 00453              2017

 

                                        ([id number]) OFFICE/OUTPATIENT VISIT EST

Diagnosis: Impaired fasting glucose[ICD10: R73.01]

Diagnosis: Mastodynia[ICD10: N64.4]

Diagnosis: Mixed hyperlipidemia[ICD10: E78.2]

Diagnosis: Essential (primary) hypertension[ICD10: I10]

Diagnosis: Other fatigue[ICD10: R53.83] Akanksharj Driver        AKANKSHA SAramis 

VILLA

[facility]                    CPT-4: 25336              2017

 

                                        (88809) OFFICE/OUTPATIENT VISIT EST

Diagnosis: Acute upper respiratory infection, unspecified[ICD10: J06.9] Luba KEVINQUELINE SAramis VILLA [facility] CPT-4: 18609        2017

 

                                        ([id number]) OFFICE/OUTPATIENT VISIT EST

Diagnosis: Acute mastoiditis without complications, right ear[ICD10: H70.001] 

Akanksha Xiangndangela HIGHTOWERLINE SAramis VILLA [facility] CPT-4: 73188        2016

 

                                        ([id number]) OFFICE/OUTPATIENT VISIT EST

Diagnosis: FLU VACCINE[ICD10: Z23] Akanksha Xiangndangela HIGHTOWERLINE OLIVIA KEY [facility]                       CPT-4: 73079              10/07/2016

 

                                        (35071) OFFICE/OUTPATIENT VISIT EST

Diagnosis: Mixed hyperlipidemia[ICD10: E78.2]

Diagnosis: Essential (primary) hypertension[ICD10: I10]

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

Diagnosis: Impaired fasting glucose[ICD10: R73.01] Akanksha KEVIN

FELECIATAMMY

SAramis VILLA [facility]         CPT-4: 54565              2016

 

                                        (14003) OFFICE/OUTPATIENT VISIT EST

Diagnosis: Constipation, unspecified[ICD10: K59.00] Akanksha BAUMAN SAramis VILLA [facility] CPT-4: 92644              2016

 

                                        ([id number]) OFFICE/OUTPATIENT VISIT EST

Diagnosis: Essential (primary) hypertension[ICD10: I10]

Diagnosis: Mixed hyperlipidemia[ICD10: E78.2]

Diagnosis: Chronic kidney disease, stage 1[ICD10: N18.1] Akanksha DRIVER [employer] [facility] CPT-4: 83390              2016

 

                                        (29038) OFFICE/OUTPATIENT VISIT EST

Diagnosis: Muscle weakness (generalized)[ICD10: M62.81]

Diagnosis: Dizziness and giddiness[ICD10: R42]

Diagnosis: Essential (primary) hypertension[ICD10: I10]

Diagnosis: History of falling[ICD10: Z91.81] Akanksha DRIVER [employer] [facility]            CPT-4: 11010              2015

 

                                        ([id number]) OFFICE/OUTPATIENT VISIT EST

Diagnosis: FLU VACCINE[ICD10: Z23] Akanksha KEY [employer] 

[facility]                       CPT-4: 72411              10/16/2015

 

                                        ([id number]) OFFICE/OUTPATIENT VISIT EST

Diagnosis: Acute renal failure[ICD9: 584.9]

Diagnosis: POLYURIA[ICD9: 788.42] Akanksha LANCE [employer] 

[facility]                       CPT-4: 84959              09/15/2015

 

                                        ([id number]) OFFICE/OUTPATIENT VISIT EST

Diagnosis: HYPERLIPIDEMIA NEC/NOS[ICD9: 272.4]

Diagnosis: HYPERTENSION[ICD9: 401.9]

Diagnosis: CAD[ICD9: 414.00]

Diagnosis: Hyperglycemia[ICD9: 790.29]

Diagnosis: MALAISE AND FATIGUE[ICD9: 780.79] Akanksha DRIVER [employer] [facility]            CPT-4: 10798              09/10/2015

 

                                        ([id number]) OFFICE/OUTPATIENT VISIT EST

Diagnosis: MALAISE AND FATIGUE[ICD9: 780.79]

Diagnosis: HYPOGLYCEMIA[ICD9: 251.2]

Diagnosis: Grieving[ICD9: 309.0]

Diagnosis: ABDOMINAL PAIN[ICD9: 789.00] Akanksha DRIVER

[employer] [facility]                    CPT-4: 57192              2015

 

                                        OFFICE/OUTPATIENT VISIT EST

Diagnosis: CERUMEN IMPACTION[ICD9: 380.4]

Diagnosis: EUSTACHIAN TUBE DYSFUNCTION[ICD9: 381.81] Bertha DRIVER [facility] CPT-4: 93820              2015

 

                                        ([id number]) OFFICE/OUTPATIENT VISIT EST

Diagnosis: Leg pain[ICD9: 729.5]

Diagnosis: SUPERFIC PHLEBITIS-LEG[ICD9: 451.0] Akanksha DRIVER [facility]            CPT-4: 98531              2015

 

                                        ([id number]) OFFICE/OUTPATIENT VISIT EST

Diagnosis: Thoracic back pain[ICD9: 724.1]

Diagnosis: SPASM OF MUSCLE[ICD9: 728.85] Akanksha DRIVER [facility]            CPT-4: 90868              2015

 

                                        ([id number]) OFFICE/OUTPATIENT VISIT EST

Diagnosis: DIZZINESS/VERTIGO[ICD9: 780.4]

Diagnosis: Benign positional vertigo[ICD9: 386.11]

Diagnosis: HYPERTENSION[ICD9: 401.9]

Diagnosis: Suspicious nevus[ICD9: 238.2] Akanksha DRIVER [facility]            CPT-4: 70528              2015

 

                                        ([id number]) OFFICE/OUTPATIENT VISIT EST

Diagnosis: FLU VACCINE[ICD10: Z23] Akanksha SPENCER

[facility]                       CPT-4: 31610              10/17/2014

 

                                        OFFICE/OUTPATIENT VISIT EST

Diagnosis: SINUSITIS, ACUTE[ICD9: 461.9]

Diagnosis: EUSTACHIAN TUBE DYSFUNCTION[ICD9: 381.81] Bertha DRIVER [facility] CPT-4: 58301              2014

 

                                        (72654) OFFICE/OUTPATIENT VISIT EST

Diagnosis: DIZZINESS/VERTIGO[ICD9: 780.4]

Diagnosis: HYPERTENSION[ICD9: 401.9] Akanksha MEJIA [facility]                       CPT-4: 60193              2014

 

                                        (64593) OFFICE/OUTPATIENT VISIT EST

Diagnosis: HYPERTENSION[ICD9: 401.9]

Diagnosis: MALAISE AND FATIGUE[ICD9: 780.79]

Diagnosis: SINUSITIS, ACUTE[ICD9: 461.9] Akanksha DRIVER [facility]            CPT-4: 90776              2014

 

                                        (56969) OFFICE/OUTPATIENT VISIT EST

Diagnosis: HYPERLIPIDEMIA NEC/NOS[ICD9: 272.4]

Diagnosis: HYPERTENSION[ICD9: 401.9]

Diagnosis: B12 DEFIC ANEMIA NEC[ICD9: 281.1]

Diagnosis: HYPOGLYCEMIA[ICD9: 251.2] Akanksha Villa MEJIA [facility]                       CPT-4: 98034              2014

 

                                        OFFICE/OUTPATIENT VISIT EST

Diagnosis: COUGH[ICD9: 786.2] Bertha DRIVER [facility] 

CPT-4: 81657                            2014

 

                                        OFFICE/OUTPATIENT VISIT EST

Diagnosis: COUGH[ICD9: 786.2]

Diagnosis: URI, ACUTE[ICD9: 465.9] Bertha KEY [facility]                       CPT-4: 56376              10/15/2013

 

                                        (03431) OFFICE/OUTPATIENT VISIT EST

Diagnosis: HYPERTENSION[ICD9: 401.9]

Diagnosis: CEPHALGIA[ICD9: 784.0]

Diagnosis: ANXIETY STATE NOS[ICD9: 300.00]

Diagnosis: FLU VACCINE[ICD9: V04.81] [facility]                       CPT-[facility]: 49243              2013

 

                                        (03647) OFFICE/OUTPATIENT VISIT EST

Diagnosis: DIZZINESS/VERTIGO[ICD9: 780.4]

Diagnosis: SINUSITIS, ACUTE[ICD9: 461.9]

Diagnosis: Benign positional vertigo[ICD9: 386.11] Akankshatammy Driver        DORITA IZQUIERDOTAMMY

OLIVIA DRIVER [facility]         CPT-4: 56151              2013

 

                                        OFFICE/OUTPATIENT VISIT EST

Diagnosis: OTITIS MEDIA NOS[ICD9: 382.9] Akanksha Xiangndangela DRIVER [facility]            CPT-4: 28628              2013

 

                                        OFFICE/OUTPATIENT VISIT EST

Diagnosis: Perforation of ear drum[ICD9: 384.20]

Diagnosis: SINUSITIS, ACUTE[ICD9: 461.9] Akanksha Driver        [facility]            CPT-4: 43309              05/10/2013

 

                                        ([id number]) OFFICE/OUTPATIENT VISIT EST

Diagnosis: Mastalgia[ICD9: 611.71] Akanksha KEY [facility]                       CPT-4: 42526              2013

 

                                        ([id number]) OFFICE/OUTPATIENT VISIT EST

Diagnosis: HYPERLIPIDEMIA NEC/NOS[ICD9: 272.4]

Diagnosis: HYPERTENSION[ICD9: 401.9]

Diagnosis: DIZZINESS/VERTIGO[ICD9: 780.4]

Diagnosis: Hyperglycemia[ICD9: 790.29]

Diagnosis: MALAISE AND FATIGUE[ICD9: 780.79] Akanksha Otoolerodo        NYA

E SAramis 

VILLA [facility]            CPT-4: 62788              2012

 

                                        (16684) OFFICE/OUTPATIENT VISIT EST

Diagnosis: EUSTACHIAN TUBE DYSFUNCTION[ICD9: 381.81]

Diagnosis: DIZZINESS/VERTIGO[ICD9: 780.4]

Diagnosis: ABDOMINAL PAIN[ICD9: 789.00]

Diagnosis: GERD[ICD9: 530.81]

Diagnosis: CERUMEN IMPACTION[ICD9: 380.4] Akanksha Otoolendangela HIGHTOWERLINE S

Aramis 

[facility]ILLA [facility]            CPT-4: 75639              2012

 

                                        OFFICE/OUTPATIENT VISIT EST

Diagnosis: ABDOMINAL PAIN[ICD9: 789.00]

Diagnosis: DYSPEPSIA[ICD9: 536.8] Akanksha HIGHTOWERLINE SAramis GUERRERO

CASI [facility]                       CPT-4: 57617              10/23/2012

 

                                        (99645) OFFICE/OUTPATIENT VISIT EST

Diagnosis: ABDOMINAL PAIN[ICD9: 789.00]

Diagnosis: DYSPEPSIA[ICD9: 536.8]

Diagnosis: IBS[ICD9: 564.1] Akanksha Villa KEVINQUELINE SAramis VILLA [facility] CPT

-

4: 45418                                10/10/2012

 

                                        OFFICE/OUTPATIENT VISIT EST

Diagnosis: DIZZINESS/VERTIGO[ICD9: 780.4]

Diagnosis: HYPOGLYCEMIA[ICD9: 251.2] Akanksha Xiangrodo BAUMAN SAramis XIANG MEJIA [facility]                       CPT-4: 10500              2012

 

                                        ([id number]) OFFICE/OUTPATIENT VISIT EST

Diagnosis: URINARY TRACT INFECTION[ICD9: 599.0] Akanksha Orender        KATJA

LINE SAramis

VILLA [facility]            CPT-4: 88217              2012

 

                                        (28463) OFFICE/OUTPATIENT VISIT EST

Diagnosis: HYPERLIPIDEMIA NEC/NOS[ICD9: 272.4]

Diagnosis: HYPERTENSION[ICD9: 401.9]

Diagnosis: MALAISE AND FATIGUE[ICD9: 780.79]

Diagnosis: DIZZINESS/VERTIGO[ICD9: 780.4] Akanksha DRIVER [employer] [facility]            CPT-4: 17561              2012

 

                                        (31921) OFFICE/OUTPATIENT VISIT EST

Diagnosis: DIZZINESS/VERTIGO[ICD9: 780.4]

Diagnosis: MALAISE AND FATIGUE[ICD9: 780.79]

Diagnosis: HYPERTENSION[ICD9: 401.9] Akanksha MEJIA [employer]

[facility]                       CPT-4: 96309              2012

 

                                        OFFICE/OUTPATIENT VISIT EST

Diagnosis: LUMB/LUMBOSAC DISC DEGEN[ICD9: 722.52]

Diagnosis: Radiculopathy of leg[ICD9: 724.4]

Diagnosis: SACROILIITIS NEC[ICD9: 720.2]

Diagnosis: SPINAL ENTHESOPATHY[ICD9: 720.1] Akanksha DRIVER [employer] [facility]            CPT-4: 45440              2012

 

                                        (61622) OFFICE/OUTPATIENT VISIT EST

Diagnosis: PAIN, LOWER BACK[ICD9: 724.2]

Diagnosis: SPASM OF MUSCLE[ICD9: 728.85]

Diagnosis: SCIATICA[ICD9: 724.3]

Diagnosis: Lumbar degenerative disc disease[ICD9: 722.52] Akanksha DRIVER [employer] [facility] CPT-4: 35632              2012

 

                                        (55783) OFFICE/OUTPATIENT VISIT EST

Diagnosis: PAIN IN THORACIC SPINE[ICD9: 724.1]

Diagnosis: SPASM OF MUSCLE[ICD9: 728.85] Akanksha DRIVER [employer] [facility]            CPT-4: 28009              2012

 

                                        (50384) OFFICE/OUTPATIENT VISIT EST

Diagnosis: PAIN, LOWER BACK[ICD9: 724.2]

Diagnosis: SPASM OF MUSCLE[ICD9: 728.85]

Diagnosis: Sacroiliac dysfunction[ICD9: 739.4]

Diagnosis: Lumbar degenerative disc disease[ICD9: 722.52] Akanksha OTOOLENDER  [facility] CPT-4: 79035              2012

 

                                        OFFICE/OUTPATIENT VISIT EST

Diagnosis: MALAISE AND FATIGUE[ICD9: 780.79]

Diagnosis: HYPOTENSION[ICD9: 458.9]

Diagnosis: CAD[ICD9: 414.00] Akanksha SPENCERER  [facility] 

CPT-4: 30860                            2012

 

                                        OFFICE/OUTPATIENT VISIT EST

Diagnosis: SINUSITIS, ACUTE[ICD9: 461.9]

Diagnosis: PHARYNGITIS, ACUTE[ICD9: 462]

Diagnosis: CERUMEN IMPACTION[ICD9: 380.4] Akanksha [facility]            CPT-4: 58929              2011

 

                                        OFFICE/OUTPATIENT VISIT EST

Diagnosis: PAIN IN THORACIC SPINE[ICD9: 724.1]

Diagnosis: GERD[ICD9: 530.81]

Diagnosis: DIZZINESS/VERTIGO[ICD9: 780.4] Akanksha [facility]            CPT-4: 01409              2011

 

                OFFICE/OUTPATIENT VISIT EST Akanksha OTOOLE

NDER DO [facility] CPT-

4: 17445                                2011

 

                    (62553) OFFICE/OUTPATIENT VISIT EST Akanksha CASTILLO SAramis ORENDER DO 

[facility]                       CPT-4: 19744              2011

 

                                        (00780) OFFICE/OUTPATIENT VISIT, EST

Diagnosis: PAIN, LOWER BACK[ICD9: 724.2] Akanksha Villa BAUMAN SAramis

 

[facility]            CPT-4: 92824              2011

 

                    (87322) OFFICE/OUTPATIENT VISIT, EST Akanksha IZQUIERDOTAMMY SAramis ORENDER DO

[facility]                       CPT-4: 88558              2011

 

                    ([id number]) OFFICE/OUTPATIENT VISIT, EST Akanksha Driver  DORITA

RJ SAramis ORENDER DO

[facility]                       CPT-4: 47147              2010

 

                    (92415) OFFICE/OUTPATIENT VISIT, EST Akanksharj Otoolendangela  DORITA

FELECIATAMMY S. ORENDER DO

[facility]                       CPT-4: 24122              2010

 

                    (83693) OFFICE/OUTPATIENT VISIT, JED BAKER [facility]

[facility]                       CPT-4: 39147              2010

 

                    (38213) OFFICE/OUTPATIENT VISIT, EST Akanksha DE LA ROSA SAramis ORENDER DO

[facility]                       CPT-4: 13396              2010

 

                    (70376) OFFICE/OUTPATIENT VISIT, JED DE LA ROSA SAramis [facility] DO

[facility]                       CPT-4: 62591              2010

 

                    (76140) OFFICE/OUTPATIENT VISIT, JDE BAKER ORENDER DO

[facility]                       CPT-4: 52322              2010







Plan of Care





             Planned Activity Notes        Codes        Status       Date

 

                                        Appointment: Akanksha Driver

WPtel:[phone]

                                        [address] [place] [place] [address]                                              LAB             2019

 

                                        Appointment: Akanksha Driver

WPtel:[phone]

                                        [address] [place] [address]

[place]                                              INJECTION       10/22/2019

 

             Patient Education: INFLUENZA VACCINE CDC                           

Completed    10/22/2019

 

                                        Appointment: Akanksha Driver

WPtel:[phone]

                                        [address] [place] [place] [address]                                              LAB             10/11/2019

 

                          Visit Diagnosis Plan: Acute serous otitis media, left 

ear Discussion: instructed

to use flonase and claritin daily for symptom relief. discussed with patient 
that if no improvement in 2 weeks, will need to follow up with ENT for audiology
exam. instructed to call office with any other symptoms such as otalgia, 
dysphagia, fever, etc.

                                        ICD-9 : 381.01

ICD-10 : H65.02

                                                    2019

 

                                        Appointment: Nat Lozoya

                                        [address] [place] [address]                                              ACUTE ILLNESS   2019

 

                          Visit Diagnosis Plan: Urinary tract infection, site no

t specified Discussion: 

Started an old abx prescription

                                        ICD-9 : 599.0

ICD-10 : N39.0

                                                    06/10/2019

 

                          Visit Diagnosis Plan: Hypoglycemia, unspecified Discus

madeleine: Monitor BS At least 

6 small meals a day each with protein

                                        ICD-9 : 251.2

ICD-10 : E16.2

                                                    06/10/2019

 

                          Visit Diagnosis Plan: Essential (primary) hypertension

 Discussion: Stable Check 

CMP

                                        ICD-9 : 401.9

ICD-10 : I10

                                                    06/10/2019

 

                          Visit Diagnosis Plan: Other fatigue Discussion: Check 

CBC, TSH

                                        ICD-9 : 780.79

ICD-10 : R53.83

                                                    06/10/2019

 

                                        Appointment: Akanksha Drivertel:[phone]

                                        [address] [place], [place] [id number]

[place]                                              FOLLOW UP       06/10/2019

 

                          Visit Diagnosis Plan: Essential (primary) hypertension

 Discussion: Stable Sees 

Dr. Clements this month

                                        ICD-9 : 401.9

ICD-10 : I10

                                                    2019

 

                          Visit Diagnosis Plan: Urinary tract infection, site no

t specified Discussion: 

Macrobid 100mg po BID for 1 week

                                        ICD-9 : 599.0

ICD-10 : N39.0

                                                    2019

 

                          Visit Diagnosis Plan: Gastro-esophageal reflux disease

 without esophagitis 

Discussion: Stable on omeprazole

Follow Up: 3 months

                                        ICD-9 : 530.81

ICD-10 : K21.9

                                                    2019

 

                                        Appointment: Akanksha Driver

WPtel:[phone]

                                        [address] [place], [place] [id number]

[place]                                              FOLLOW UP       2019

 

                          Patient Education: metoprolol tartrate- [employer] [facility] [id number] 

[email or web address]/resources/[email or web address]

                                        Completed           2019

 

                                        Appointment: Akanksha Driver

WPtel:[phone]

                                        [address] [place], [place] [address]

[place]                                              CANCELED        02/15/2019

 

                          Visit Diagnosis Plan: Gastro-esophageal reflux disease

 without esophagitis 

Discussion: Continue protonix and carafate Proceed with updated EGD

                                        ICD-9 : 530.81

ICD-10 : K21.9

                                                    2018

 

                          Visit Diagnosis Plan: Epigastric pain Discussion: [facility] CT abdomen/pelvis due to

ongoing abdominal pain

                                        ICD-9 : 789.06

ICD-10 : R10.13

                                                    2018

 

                                        Appointment: Akanksha Driver

WPtel:[phone] [address] [facility] [place]

[place]                                              FOLLOW UP       2018

 

                    Care Plan: CT PELVIS W/O DYE                     LOINC : 361

08-9

                          Pending                   2018

 

                    Care Plan: CT ABDOMEN W/O DYE                     LOINC : 36

103-0

                          Pending                   2018

 

                    Care Plan: Referral Order                     SNOMED-CT : 30

[id number]

                          Pending                   2018

 

                                        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

art disease of native coronary artery 

without angina pectoris                 Discussion: S/P cardiac cath--doing medi

vicki management 

with imdur and metoprolol increased Has fwup with cardiology next week Discussed
cardiac rehab

                                        ICD-9 : 414.00

ICD-10 : I25.10

                                                    2018

 

                          Visit Diagnosis Plan: Generalized anxiety disorder Dis

cussion: Stable

                                        ICD-9 : 300.00

ICD-10 : F41.1

                                                    2018

 

                          Visit Diagnosis Plan: Gastro-esophageal reflux disease

 without esophagitis 

Discussion: Continue protonix at BID dosing and carafate BID

Follow Up: 2 months

                                        ICD-9 : 530.81

ICD-10 : K21.9

                                                    2018

 

                          Visit Diagnosis Plan: Essential (primary) hypertension

 Discussion: Stable

                                        ICD-9 : 401.9

ICD-10 : I10

                                                    2018

 

                                        Appointment: Akanksha Driver

WPtel:[phone]

                                        [facility] [place]

[place]                                              FOLLOW UP       2018

 

                          Visit Diagnosis Plan: Gastro-esophageal reflux disease

 without esophagitis 

Discussion: Continue protonix at BID dosing Continue carafate at q AC and HS 
dosing for 2 more weeks then decrease to BID dosing

Follow Up: 4 weeks

                                        ICD-9 : 530.81

ICD-10 : K21.9

                                                    10/02/2018

 

                          Visit Diagnosis Plan: Urinary tract infection, site no

t specified Discussion: 

Reculture urine

                                        ICD-9 : 599.0

ICD-10 : N39.0

                                                    10/02/2018

 

                          Visit Diagnosis Plan: Generalized anxiety disorder Dis

cussion: Increase xanax to

BID dosing especially with upcoming cardiac testing which is already making 
patient more anxious and worried

                                        ICD-9 : 300.00

ICD-10 : F41.1

                                                    10/02/2018

 

                          Visit Diagnosis Plan: Palpitations Discussion: Bobby walter going to schedule 

Lexiscan

                                        ICD-9 : 785.1

ICD-10 : R00.2

                                                    10/02/2018

 

                                        Appointment: Akanksha Driver

WPtel:[phone]

                                        [address] [place] [address]                                              FOLLOW UP       10/02/2018

 

             Patient Education: Patient Medication Summary                      

     Completed    10/02/2018

 

                                        Appointment: Akanksha Driver

WPtel:[phone]

                                        [address] [place] [address]                                              LAB             2018

 

             Patient Education: Patient Medication Summary                      

     Completed    2018

 

                          Visit Diagnosis Plan: Epigastric pain Discussion: Cont

inue protonix and carafate

but make into a slurry Flu shot given Discussed EGD if symptoms persist

Follow Up: 2 weeks

                                        ICD-9 : 789.06

ICD-10 : R10.13

                                                    2018

 

                          Visit Diagnosis Plan: Generalized anxiety disorder Dis

cussion: Did discuss 

increased risk of benzodiazepines causing dementia but at this time will stay at
xanax 0.25mg po BID

                                        ICD-9 : 300.00

ICD-10 : F41.1

                                                    2018

 

                                        Appointment: Akanksha Driver

WPtel:[phone]

                                        [address] [place] [address]                                              FOLLOW UP       2018

 

             Patient Education: Patient Medication Summary                      

     Completed    2018

 

                          Visit Diagnosis Plan: Essential (primary) hypertension

 Discussion: Has 

hydralazine to use prn per cardiology Going to do 30 day holter monitor

                                        ICD-9 : 401.9

ICD-10 : I10

                                                    2018

 

                          Visit Diagnosis Plan: Hypoglycemia, unspecified Discus

madeleine: 6 small meals a 

day--each with protein Increase fluid intake

                                        ICD-9 : 251.2

ICD-10 : E16.2

                                                    2018

 

                          Visit Diagnosis Plan: Gastro-esophageal reflux disease

 without esophagitis 

Discussion: Change to protonix 40mg po BID

Follow Up: 1 months

                                        ICD-9 : 530.81

ICD-10 : K21.9

                                                    2018

 

                                        Appointment: Akanksha Driver

WPtel:[phone]

                                        [address] [place] [address]                                              ACUTE ILLNESS   2018

 

             Patient Education: Patient Medication Summary                      

     Completed    2018

 

                          Visit Diagnosis Plan: Dizziness and giddiness Discussi

on: blood work to be 

completed in office including cmp, cbc, troponin to rule out glucose 
intolerance, infection, dehydration. instructed patient that she needs to see 
her cardiologist sooner than oct and patient requested we contact dr. clements's 
office. will have front office make appt. patient has carotid doppler annually.

                                        ICD-9 : 780.4

ICD-10 : R42

                                                    2018

 

                                        Appointment: Nat Lozoya

                                        [address] [place] [address]                                              ACUTE ILLNESS   2018

 

             Patient Education: Patient Medication Summary                      

     Completed    2018

 

                          Visit Diagnosis Plan: Cervicalgia Discussion: Complete

 course of PT since 

symptoms improved Continue stretching exercises Notify if symptoms return or 
worsen

                                        ICD-9 : 723.1

ICD-10 : M54.2

                                                    2018

 

                                        Appointment: Akanksha Driver

WPtel:[phone]

                                        [address] [place], [place] [id number]                                              FOLLOW UP       2018

 

             Patient Education: Patient Medication Summary                      

     Completed    2018

 

                          Visit Diagnosis Plan: Hypoglycemia, unspecified Discus

madeleine: Eat something with 

protein every 2 hrs

                                        ICD-9 : 251.2

ICD-10 : E16.2

                                                    2018

 

                          Visit Diagnosis Plan: Other spondylosis with radiculop

athy, cervical region 

Discussion: Check MRI of cervical spine

                                        ICD-9 : 721.0

ICD-10 : M47.22

                                                    2018

 

                          Visit Diagnosis Plan: Vertigo of central origin, bilat

eral Discussion: Discussed

PT for vestibular therapy

                                        ICD-9 : 386.2

ICD-10 : H81.43

                                                    2018

 

                                        Appointment: Akanksha Driver

WPtel:[phone]

                                        [facility]

                                                              2018

 

             Patient Education: Patient Medication Summary                      

     Completed    2018

 

                    Care Plan: MRI NECK SPINE W/O DYE                     LOINC 

: [id number]

                          Pending                   2018

 

                          Visit Diagnosis Plan: Otalgia, bilateral Discussion: k

enalog 40 mg given to 

patient im. instructed patient to monitor blood sugar at home due to risk of 
increased sugar. instructed patient to rtc on wednesday if no improvement and 
may require antibiotic.

                                        ICD-9 : 388.70

ICD-10 : H92.03

                                                    2018

 

                          Visit Diagnosis Plan: Benign paroxysmal vertigo, bilat

eral Discussion: patient 

has meclizine at home but states it makes her too tired. instructed patient to 
cut in half and take at night. if it doesn't cause too much drowsiness, 
instructed to take bid until feeling better. instructed to rtc wed if no 
improvement or worsening symptoms. instructed patient to increase fluid intake 
as well.

                                        ICD-9 : 386.11

ICD-10 : H81.13

                                                    2018

 

                                        Appointment: Nat Lozoya

                                        [address] [place] [address]                                              ACUTE ILLNESS   2018

 

             Patient Education: Patient Medication Summary                      

     Completed    2018

 

                          Visit Diagnosis Plan: Left lower quadrant pain Discuss

ion: Culture urine Notify 

if worsens

                                        ICD-9 : 789.04

ICD-10 : R10.32

                                                    2018

 

                          Visit Diagnosis Plan: Low back pain Discussion: Stretc

hes Topical aspercreme 

Tylenol 1 po TID

                                        ICD-9 : 724.2

ICD-10 : M54.5

                                                    2018

 

                          Visit Diagnosis Plan: Radiculopathy, lumbosacral regio

n Discussion: As above

                                        ICD-9 : 724.4

ICD-10 : M54.17

                                                    2018

 

                                        Appointment: Akanksha Driver

WPtel:[phone]

                                        [address] [place] [address]                                              ACUTE ILLNESS   2018

 

             Patient Education: Patient Medication Summary                      

     Completed    2018

 

                                        Appointment: Akanksha Driver

WPtel:[phone]

                                        [address] [place] [address]

[place]                                              INJECTION       10/06/2017

 

             Patient Education: Patient Medication Summary                      

     Completed    10/06/2017

 

                                        Appointment: Akanksha Driver

WPtel:[phone]

                                        [address] [place] [address]

[place]                                              LAB             2017

 

             Patient Education: Patient Medication Summary                      

     Completed    2017

 

                          Visit Diagnosis Plan: Pain in thoracic spine Discussio

n: PT has helped Continue 

stretches and walking [facility]ed [facility]

                                        ICD-9 : 724.1

ICD-10 : M54.6

                                                    2017

 

                          Visit Diagnosis Plan: Other intervertebral disc degene

ration, lumbar region 

Follow Up: 3 months

                                        ICD-9 : 722.52

ICD-10 : M51.36

                                                    2017

 

                                        Appointment: Akanksha Driver

WPtel:[phone]

                                        [address] [place], [place] [address]

[place]                                              FOLLOW UP       2017

 

             Patient Education: Patient Medication Summary                      

     Completed    2017

 

                          Visit Diagnosis Plan: Low back pain Discussion: Start 

PT for low back- Seems 

like abdominal pain is radiating from low back

Follow Up: 5 weeks

                                        ICD-9 : 724.2

ICD-10 : M54.5

                                                    2017

 

                          Visit Diagnosis Plan: Left lower quadrant pain Discuss

ion: Had CT scan of 

abdomen/pelvis in 2017 and normal colonoscopy in 2015

                                        ICD-9 : 789.04

ICD-10 : R10.32

                                                    2017

 

                                        Appointment: Akanksha Driver

WPtel:[phone]

                                        [address] [place], [place] [id number]                                              ACUTE ILLNESS   2017

 

             Patient Education: Patient Medication Summary                      

     Completed    2017

 

                                        Appointment: Akanksha Driver

WPtel:[phone]

                                        [address] [place] [address]

[place]                                              LAB             2017

 

             Patient Education: Patient Medication Summary                      

     Completed    2017

 

                          Visit Diagnosis Plan: Mixed hyperlipidemia Discussion:

 Check lipids

                                        ICD-9 : 272.4

ICD-10 : E78.2

                                                    2017

 

                          Visit Diagnosis Plan: Impaired fasting glucose Discuss

ion: Return in AM for CMP,

HbA1C Accuchecks daily Diet/Exercise discussed at length

                                        ICD-9 : 790.29

ICD-10 : R73.01

                                                    2017

 

                          Visit Diagnosis Plan: Other fatigue Discussion: Check 

CBC, TSH

                                        ICD-9 : 780.79

ICD-10 : R53.83

                                                    2017

 

                          Visit Diagnosis Plan: Mastodynia Discussion: Check Jace

ateral diagnostic 

mammogram with US

                                        ICD-9 : 611.71

ICD-10 : N64.4

                                                    2017

 

                                        Appointment: Akanksha Driver

WPtel:[phone]

                                        [address] [place], [place] [id number]              3/7 confirmed `sl                 ACUTE ILLNESS   2017

 

             Patient Education: Patient Medication Summary                      

     Completed    2017

 

                    Care Plan: MAMMOGRAM SCREENING                     LOINC : 2

6347-5

                          Pending                   2017

 

                          Visit Diagnosis Plan: Acute upper respiratory infectio

n, unspecified Discussion:

Exam is reassuring Likely viral illness Supportive care reviewed and encouraged 
Follow up PRN

                                        ICD-9 : 465.9

ICD-10 : J06.9

                                                    2017

 

                                        Appointment: Luba Stevenson 

                                        [address] [place] [address]                                              ACUTE ILLNESS   2017

 

             Patient Education: Patient Medication Summary                      

     Completed    2017

 

                          Visit Plan:               Supportive care. Rest, Fluid

s, Tylenol/Motrin prn fever or 

bodyaches. Notify if worsening symptoms.

                                                            2016

 

                                        Appointment: Akanksha Driver

WPtel:[phone]

                                        [address] [place] [address]                                              ACUTE ILLNESS   2016

 

             Patient Education: Patient Medication Summary                      

     Completed    2016

 

                                        Appointment: Akanksha Driver

WPtel:[phone]

                                        [address] [place], [place] [address]

[place]                                              INJECTION       10/07/2016

 

             Patient Education: Patient Medication Summary                      

     Completed    10/07/2016

 

                                        Appointment: Akanksha Drivertel:[phone]

                                        [address] [place], [place] [address]

[place]                                              LAB             2016

 

             Patient Education: Patient Medication Summary                      

     Completed    2016

 

                          Visit Plan:               Add miralax daily Use daily 

probiotic and metamucil and push fluids

Notify if worsens/persists

                                                            2016

 

                                        Appointment: Akanksha Driver

WPtel:[phone]

                                        [address] [place], [place] [id number]              16 confirmed ~sl                 ACUTE ILLNESS   2016

 

             Patient Education: Patient Medication Summary                      

     Completed    2016

 

                          Visit Plan:               No NSAIDs Kidney function di

scussed Discussed hydration Monitor lab

every 4months so will check CMP, HbA1C next month

                                                            2016

 

                                        Appointment: Akanksha Driver

WPtel:[phone]

                                        [address] [place], [place] [address]              03/15 confirmed ~sl                 ACUTE ILLNESS   2016

 

             Patient Education: Patient Medication Summary                      

     Completed    2016

 

                          Visit Plan:               Vestibular exercises Refill 

flonase Strict low Na diet--discussed 

that needs to read labels Rx for walker with chair given due to muscle 
weakness/unsteadiness Has carotids and abdominal aorta and legs checked in 
January Check Chem 7

                                                            2015

 

                                        Appointment: Akanksha Driver

WPtel:[phone]

                                        [address] [place], [place] [id number]              12/15/15 appt confirmed cn                 FOLLOW UP       

 

             Patient Education: Patient Medication Summary                      

     Completed    2015

 

             Patient Education: Vertigo                           Completed    1

2015

 

                                        Appointment: Akanksha Driver

WPtel:[phone]

                                        [address] [place], [place] [address]

[place]                                              INJECTION       10/16/2015

 

             Patient Education: Patient Medication Summary                      

     Completed    10/16/2015

 

                                        Appointment: Akanksha Driver

WPtel:[phone]

                                        [address] [place], [place] [address]                                              UA              09/15/2015

 

             Patient Education: Patient Medication Summary                      

     Completed    09/15/2015

 

                                        Referral: Landon De La Torre

WPtel:[phone]

#1 [facility]

[address]              Referral                        Completed       2015

 

                                        Appointment: Akanksha Driver

WPtel:[phone]

                                        [address] [place] [address]                                              LAB             09/10/2015

 

             Patient Education: Patient Medication Summary                      

     Completed    09/10/2015

 

                          Visit Plan:               Check CMP, CBC, TSH, Free T4

, B12, Lipids, HbA1C Discussed 6 small 

meals a day each with protein Would likely benefit from antidepressant Update 
colonoscopy

                                                            2015

 

                                        Appointment: Akanksha Driver

WPtel:[phone]

                                        [address] [place], [place] [id number]

              9/8/15 confirmed                 ACUTE ILLNESS   2015

 

             Patient Education: Patient Medication Summary                      

     Completed    2015

 

                          Visit Plan:               Cerumen flush with warm wate

r and peroxide - good results Resume 

Nasonex nasal spray daily Recommended Debrox earwax removal drops

                                                            2015

 

                                        Appointment: Bertha Mon

WPtel:[phone]

                                        [address] [place] [address]                                              ACUTE ILLNESS   2015

 

             Patient Education: Patient Medication Summary                      

     Completed    2015

 

                          Visit Plan:               Continue aspirin daily Add m

eloxicam for 1week Elevate and Ice Call

on 2015

 

                                        Appointment: Akanksha Driver

WPtel:[phone]

                                        [address] [place] [address]                                              ACUTE ILLNESS   2015

 

             Patient Education: Patient Medication Summary                      

     Completed    2015

 

                          Visit Plan:               Been using baclofen at [facility] and has helped some PT for next 

2-4weeks

                                                            2015

 

                                        Appointment: Akanksha Driver

WPtel:[phone]

                                        [address] [place] [address]                                              Hospital Follow Up 2015

 

             Patient Education: Patient Medication Summary                      

     Completed    2015

 

                                        Appointment: Akanksha Driver

WPtel:[phone]

                                        [address] [place] [address]                                              LAB             2015

 

                                        Appointment: Akanksha Driver

WPtel:[phone]

                                        [address] [place] [address]              feeling better, weather -                 FOLLOW UP       

 

                                        Referral: Landon De La Torre

WPtel:[phone]

1 [facility]              Referral                        Appointment Requested 2015

 

                          Visit Plan:               Continue exercise and curren

t meds See surgery for removal of right

arm lesion

                                                            2015

 

                                        Appointment: Akanksha Driver

WPtel:[phone]

                                        [address] [place] [address]                                              FOLLOW UP       2015

 

             Patient Education: Patient Medication Summary                      

     Completed    2015

 

                                        Appointment: Akanksha Driver

WPtel:[phone]

                                        [address]

[id number]

[place]                                              INJECTION       10/17/2014

 

             Patient Education: Patient Medication Summary                      

     Completed    10/17/2014

 

                                        Appointment: Bertha Mon

WPtel:[phone]

                                        [address] [place] [address]                                              ACUTE ILLNESS   2014

 

             Patient Education: Patient Medication Summary                      

     Completed    2014

 

                          Visit Plan:               Discussed that likely lumbar

 etiology for leg weakness Will change 

amlodopine to low dose dyazide and see if helps legs and inner ear

                                                            2014

 

                                        Appointment: Akanksha Driver

WPtel:[phone]

                                        [address] [place] [address]                                              FOLLOW UP       2014

 

             Patient Education: Patient Medication Summary                      

     Completed    2014

 

                          Visit Plan:               Ceftin and continue claritin

/flonase Decrease amlodopine to 2.5mg q

HS until fwup with Card due to weakness

                                                            2014

 

                                        Appointment: Akanksha Driver

WPtel:[phone]

                                        [address] [place] [address]                                              ACUTE ILLNESS   2014

 

             Patient Education: Patient Medication Summary                      

     Completed    2014

 

                                        Appointment: Akanksha Driver

WPtel:[phone]

                                        [address] [place] [address]                                              LAB             2014

 

             Patient Education: Patient Medication Summary                      

     Completed    2014

 

                                        Appointment: Bertha Mon

WPtel:[phone]

                                        [address] [place] [address]                                              ACUTE ILLNESS   2014

 

             Patient Education: Patient Medication Summary                      

     Completed    2014

 

                          Visit Plan:               Supportive care. Rest, Fluid

s, Tylenol prn fever or bodyaches. 

Notify if worsening symptoms. Ceftin

                                                            10/15/2013

 

                                        Appointment: Bertha Mon

WPtel:[phone]

                                        [address] [place] [address]                                              ACUTE ILLNESS   10/15/2013

 

             Patient Education: Patient Medication Summary                      

     Completed    10/15/2013

 

                                        Appointment: Akanksha Driver

WPtel:[phone]

                                        [address] [place] [address] CHECK        2013

 

             Patient Education: Patient Medication Summary                      

     Completed    2013

 

                                        Appointment: Akanksha Driver

WPtel:[phone]

                                        [address] [place], [place] [id number]                                              ER Follow UP    2013

 

             Patient Education: Patient Medication Summary                      

     Completed    2013

 

                          Visit Plan:               Restart flonase BID Use mecl

izine 25mg q HS Vestibular exercises 

Claritin 10mg q AM See ENT if doesn't resolve

                                                            2013

 

                                        Appointment: Akanksha Driver

WPtel:[phone]

                                        [address] [place], [place] [id number]                                              ACUTE ILLNESS   2013

 

             Patient Education: Patient Medication Summary                      

     Completed    2013

 

                          Visit Plan:               Will continue to observe

                                                            2013

 

                                        Appointment: Akanksha Driver

WPtel:[phone]

                                        [address] [place], [place] [id number]                                              FOLLOW UP       2013

 

             Patient Education: Patient Medication Summary                      

     Completed    2013

 

                          Visit Plan:               ERx for Augmentin 875mg q12 

x 10 days and Floxin otic drops 5 gtts 

AD x7days Sample of Nasonex per pt request Discussed treatment (nasal saline, 
salt water gargles, keeping right ear clean and dry, for worsening go to UC on 
weekend, Tylenol/ibuprofen, etc.) RTC 2 weeks for ear recheck.

                                                            05/10/2013

 

                                        Appointment: Jill Jean 

WPtel:[phone]

                                        [address] [place], [place] [id number]                                              ACUTE ILLNESS   05/10/2013

 

             Patient Education: Patient Medication Summary                      

     Completed    05/10/2013

 

                          Visit Plan:               Decrease caffeine intake Marina

ck Bilateral Mammogram with US of left 

breast

                                                            2013

 

                                        Appointment: Akanksha Driver

WPtel:[phone]

                                        [address] [place], [place] [address]                                              ACUTE ILLNESS   2013

 

             Patient Education: Patient Medication Summary                      

     Completed    2013

 

                                        Appointment: Kate Hall

WPtel:[phone]

                                        [address] [place], [place] [id number]              appt scheduled                  ACUTE ILLNESS   2013

 

                                        Appointment: Akanksha Driver

WPtel:[phone]

                                        [address] [place], [place] [id number]

                                              LAB             2012

 

             Patient Education: Patient Medication Summary                      

     Completed    2012

 

                          Visit Plan:               Continue off carafate and se

e how does Continue probiotic Add 

Vestibular exercises and use meclizine prn Continue Nasonex Check fasting lab 
including CMP, Lipids, CBC, TSH, Free T4, HbA1C

                                                            2012

 

                                        Appointment: Akanksha Driver

WPtel:[phone]

                                        [address] [place], [place] [id number]

                                              FOLLOW UP       2012

 

             Patient Education: Patient Medication Summary                      

     Completed    2012

 

                          Visit Plan:               Continue carafate for 4more 

weeks at current dose then decrease to 

BID for 2wks then q HS

                                                            10/23/2012

 

                                        Appointment: Akanksha Driver

WPtel:[phone]

                                        [address] [place], [place] [id number]

                                              FOLLOW UP       10/23/2012

 

             Patient Education: Patient Medication Summary                      

     Completed    10/23/2012

 

                          Visit Plan:               Continue omeprazole Add Ellyn

fate 1gm po q AC Add daily probiotic

                                                            10/10/2012

 

                                        Appointment: Akanksha Driver

WPtel:[phone]

                                        [address] [place], [place] [id number]

                                              ACUTE ILLNESS   10/10/2012

 

             Patient Education: Patient Medication Summary                      

     Completed    10/10/2012

 

                                        Appointment: Akanksha Driver

WPtel:[phone]

                                        [address] [place], [place] [id number]

US                                              INJECTION       2012

 

             Patient Education: Patient Medication Summary                      

     Completed    2012

 

                          Visit Plan:               Diabetic Diet Accuchecks BID

 alternating times Hold onglyza and 

Januvia for now and continue diet and exercise and weight loss and drew LOREDO 
Discussed hypoglycemia and snack of peanut butter and crackers with juice or 
milk

                                                            2012

 

                                        Appointment: Akanksha Driver

WPtel:[phone]

                                        [address] [place], [place] [id number]

                                              WORK IN         2012

 

             Patient Education: Patient Medication Summary                      

     Completed    2012

 

                                        Appointment: Akanksha Driver

WPtel:[phone]

                                        [address] [place] [address]              2012

 

             Patient Education: Patient Medication Summary                      

     Completed    2012

 

                                        Appointment: Akanksha Driver

WPtel:[phone]

                                        [address] [place] [address]             2012

 

             Patient Education: Patient Medication Summary                      

     Completed    2012

 

                                        Appointment: Akanksha Driver

WPtel:[phone]

                                        [address] [place] [address]                                              ACUTE ILLNESS   2012

 

             Patient Education: Patient Medication Summary                      

     Completed    2012

 

                          Visit Plan:               Injection to Right SI joint 

as above Pt will call in 3 days on pain

Has PT starting in 2wks.

                                                            2012

 

                                        Appointment: Akanksha Driver

WPtel:[phone]

                                        [address] [place] [address]                                              ACUTE ILLNESS   2012

 

             Patient Education: Patient Medication Summary                      

     Completed    2012

 

                          Visit Plan:               OMT done Start PT May need u

pdated MRI if need to consider epidural

Prednisone

                                                            2012

 

                                        Appointment: Akanksha Driver

WPtel:[phone]

                                        [address] [place] [address]                                              OMT             2012

 

             Patient Education: Patient Medication Summary                      

     Completed    2012

 

                          Visit Plan:               Flexeril and Vimovo OMT done

 Daily stretches

                                                            2012

 

                                        Appointment: Akanksha Driver

WPtel:[phone]

                                        [address] [place] [address]                                              ACUTE ILLNESS   2012

 

             Patient Education: Patient Medication Summary                      

     Completed    2012

 

                          Visit Plan:               OMT done Daily stretches Vinod

st heat or biofreeze Right SI joint 

injection Call in 1week Vimovo BID

                                                            2012

 

                                        Appointment: Akanksha Driver

WPtel:[phone]

                                        [address] [place] [address]                                              ACUTE ILLNESS   2012

 

             Patient Education: Patient Medication Summary                      

     Completed    2012

 

                                        Appointment: Akanksha Drivertel:[phone]

                                        [address] [place], [place] [id number]

                                              LAB             2012

 

             Patient Education: Patient Medication Summary                      

     Completed    2012

 

                          Visit Plan:               Decrease amlodopine to 1.25m

g daily Schedule with Cardiology 

Fasting lab in AM

                                                            2012

 

                                        Appointment: Akanksha Driver

WPtel:[phone]

                                        [address] [place], [place] [id number]                                              ACUTE ILLNESS   2012

 

             Patient Education: Patient Medication Summary                      

     Completed    2012

 

                          Visit Plan:               Saline nasal flushes prn. Ty

lenol/Motrin prn headache. Notify if 

persists/symptoms worsening. Cerumen removal from ears as above

                                                            2011

 

                                        Appointment: Akanksha Driver

WPtel:[phone]

                                        [address] [place] [address]                                              ACUTE ILLNESS   2011

 

             Patient Education: Patient Medication Summary                      

     Completed    2011

 

                                        Appointment: Akanksha Driver

WPtel:[phone]

                                        [address] [place] [address]

[place]                                              INJECTION       10/05/2011

 

             Patient Education: Patient Medication Summary                      

     Completed    10/05/2011

 

                          Visit Plan:               Continue vimovo for 1more we

ek Increase Omeprazole to BID for 1mo 

then resume QD if stomach improved Vestibular exercises with meclizine q HS for 
next week

                                                            2011

 

                                        Appointment: Akanksha Driver

WPtel:[phone]

                                        [address] [place], [place] [id number]

                                              FOLLOW UP       2011

 

             Patient Education: Patient Medication Summary                      

     Completed    2011

 

                                        Appointment: Akanksha Driver

WPtel:[phone]

                                        [address] [place] [address]                                              ACUTE ILLNESS   2011

 

             Patient Education: Patient Medication Summary                      

     Completed    2011

 

                                        Appointment: Akanksha Driver

WPtel:[phone]

                                        [address]                                              LAB             2011

 

             Patient Education: Patient Medication Summary                      

     Completed    2011

 

                          Visit Plan:               Saline nasal flushes prn. Ty

lenol/Motrin prn headache. Notify if 

persists/symptoms worsening. Add Veramyst Increase Omeprazole to 20mg po BID

                                                            2011

 

                                        Appointment: Akanksha Driver

WPtel:[phone]

                                        [address] [place] [address]                                              ACUTE ILLNESS   2011

 

             Patient Education: Patient Medication Summary                      

     Completed    2011

 

                                        Appointment: Akanksha Driver

WPtel:[phone]

                                        [address] [place] [address]                                              FOLLOW UP       2011

 

             Patient Education: Patient Medication Summary                      

     Completed    2011

 

                                        Appointment: Akanksha Driver

WPtel:[phone]

                                        [address] [place] [address]                                              ACUTE ILLNESS   2011

 

             Patient Education: Patient Medication Summary                      

     Completed    2011

 

                          Visit Plan:               Nasocourt sample given. Pt. 

will notify if symptoms are worse on 

Monday.

                                                            2010

 

                                        Appointment: Kate Hall

WPtel:[phone]

                                        [address] [place] [address]                                              ACUTE ILLNESS   2010

 

             Patient Education: Patient Medication Summary                      

     Completed    2010

 

                                        Appointment: Akanksha Driver

WPtel:[phone]

                                        [address] [place], [place] [address]

[place]                                              INJECTION       10/06/2010

 

             Patient Education: Patient Medication Summary                      

     Completed    10/06/2010

 

                                        Appointment: Akanksha Driver

WPtel:[phone]

                                        [address] [place] [address]                                              FOLLOW UP       2010

 

             Patient Education: Patient Medication Summary                      

     Completed    2010

 

             Patient Education: Lexapro                           Completed    0

2010

 

                          Visit Plan:               May proceed with hiatal mykel

ia repair per Card. so will contact Dr. Cash's office to proceed with surgery Trial of Lexapro 5mg QD plus use 
xanax prn

                                                            2010

 

                                        Appointment: Akanksha Driver

WPtel:[phone]

                                        [address] [place] [address]                                              FOLLOW UP       2010

 

             Patient Education: Patient Medication Summary                      

     Completed    2010

 

                          Visit Plan:               B12 given Cont oral B12 and 

iron Fwup 1mo for B12 Proceed with 

hiatal hernia repair once card clearance

                                                            2010

 

                                        Appointment: Akanksha Driver

WPtel:[phone]

                                        [address] [place] [address]                                              FOLLOW UP       2010

 

             Patient Education: Patient Medication Summary                      

     Completed    2010

 

                                        Appointment: Akanksha Driver

WPtel:[phone]

                                        [address] [place] [address]                                              FOLLOW UP       2010

 

             Patient Education: Patient Medication Summary                      

     Completed    2010

 

                                        Appointment: Akanksha Driver

WPtel:[phone]

                                        [address] [place] [address]

[place]                                              LAB             2010

 

             Patient Education: Patient Medication Summary                      

     Completed    2010

 

                          Visit Plan:               Check fasting lab in AM--CMP

,Lipids, CBC, Vit D, TSH,FreeT4, B12

                                                            2010

 

                                        Appointment: Akanksha Driver

WPtel:[phone]

                                        [address] [place] [address]                                              FOLLOW UP       2010

 

             Patient Education: Patient Medication Summary                      

     Completed    2010

 

                                        Referral: Landon De La Torre

WPtel:[phone]

[address] [place] [address]

[place]              Referral                        Appointment Requested  

 

                                        Referral: Landon De La Torre

WPtel:[phone]

[address]              Referral                        Initiated        







Instructions





                                        Comment

 

                                        . Supportive care.  Rest, Fluids, Tyleno

l/Motrin prn fever or bodyaches.  Notify

if worsening symptoms.



 

                                        . Add miralax daily

Use daily probiotic and metamucil and push fluids

Notify if worsens/persists

 

                                        . No NSAIDs

Kidney function discussed

Discussed hydration 

Monitor lab every 4months so will check CMP, HbA1C next month

 

                                        . Vestibular exercises

Refill flonase

Strict low Na diet--discussed that needs to read labels

Rx for walker with chair given due to muscle weakness/unsteadiness

Has carotids and abdominal aorta and legs checked in January

Check Chem 7



 

                                        . Check CMP, CBC, TSH, Free T4, B12, Lip

ids, HbA1C

Discussed 6 small meals a day each with protein

Would likely benefit from antidepressant 

Update colonoscopy

 

                                        . Cerumen flush with warm water and tyler

xide - good results 

Resume Nasonex nasal spray daily

Recommended Debrox earwax removal drops 

 

                                        . Continue aspirin daily

Add meloxicam for 1week

Elevate and Ice

Call on Monday

 

                                        . Been using baclofen at bedtime and has

 helped some

PT for next 2-4weeks



 

                                        . Continue exercise and current meds

See surgery for removal of right arm lesion

 

                                        . Discussed that likely lumbar etiology 

for leg weakness

Will change amlodopine to low dose dyazide and see if helps legs and inner ear

 

                                        . Ceftin and continue claritin/flonase

Decrease amlodopine to 2.5mg q HS until fwup with Card due to weakness

 

                                        .  Supportive care.  Rest, Fluids, Tylen

ol prn fever or bodyaches.  Notify if 

worsening symptoms.

Ceftin

 

                                        . Restart flonase BID

Use meclizine 25mg q HS

Vestibular exercises

Claritin 10mg q AM

See ENT if doesn't resolve

 

                                        . Will continue to observe



 

                                        . ERx for Augmentin 875mg q12 x 10 days 

and Floxin otic drops 5 gtts AD x7days

Sample of Nasonex per pt request

Discussed treatment (nasal saline, salt water gargles, keeping right ear clean 
and dry, for worsening go to UC on weekend, Tylenol/ibuprofen, etc.)

RTC 2 weeks for ear recheck.

 

                                        . Decrease caffeine intake

Check Bilateral Mammogram with US of left breast

 

                                        Left ear flushed with warm water and per

oxide with ear syringe and then last 

removed with currette--peroxide drops instilled.  Tolerated well, no 
complications, TM intact.. Continue off carafate and see how does

Continue probiotic

Add Vestibular exercises and use meclizine prn

Continue Nasonex

Check fasting lab including CMP, Lipids, CBC, TSH, Free T4, HbA1C

 

                                        . Continue carafate for 4more weeks at c

urrent dose then decrease to BID for 

2wks then q HS

 

                                        . Continue omeprazole

Add Carafate 1gm po q AC

Add daily probiotic



 

                                        . Diabetic Diet

Accuchecks BID alternating times

Hold onglyza and Januvia for now and continue diet and exercise and weight loss 
and moniter BS

Discussed hypoglycemia and snack of peanut butter and crackers with juice or 
milk

 

                                        Informed Consent obtainded.  Right SI yasir

int cleansed with alcohol and betadine 

and injected with 3cc 1%l lidocaine with 40mg depomedrol and 40mg kenalog, 
tolerated well with no complications, neosporin and bandage applied. Injection 
to Right SI joint as above

Pt will call in 3 days on pain

Has PT starting in 2wks.

 

                                        . OMT done

Start PT

May need updated MRI if need to consider epidural

Prednisone

 

                                        . Flexeril and Vimovo

OMT done

Daily stretches

 

                                        Right SI joint cleansed with alchohol an

d betadine and injected with 3cc 1% 

lidocaine with 40mg depomedrol and 40mg kenalog, tolerated well with no 
complications, neosporin and bandage applied. OMT done

Daily stretches

Moist heat or biofreeze

Right SI joint injection

Call in 1week

Vimovo BID

 

                                        . Decrease amlodopine to 1.25mg daily

Schedule with Cardiology

Fasting lab in AM

 

                                        .  Saline nasal flushes prn. Tylenol/Mot

rin prn headache.  Notify if 

persists/symptoms worsening.

Cerumen removal from ears as above



 

                                        . Continue vimovo for 1more week

Increase Omeprazole to BID for 1mo then resume QD if stomach improved

Vestibular exercises with meclizine q HS for next week

 

                                        . Saline nasal flushes prn. Tylenol/Motr

in prn headache.  Notify if 

persists/symptoms worsening.

Add Veramyst

Increase Omeprazole to 20mg po BID

 

                                        . Nasocourt sample given.  Pt. will noti

fy if symptoms are worse on Monday. 

 

                                        . May proceed with hiatal hernia repair 

per Card. so will contact Dr. Cash's office to proceed with surgery



Trial of Lexapro 5mg QD plus use xanax prn

 

                                        . B12 given

Cont oral B12 and iron

Fwup 1mo for B12

Proceed with hiatal hernia repair once card clearance

 

                                        . Check fasting lab in AM--CMP,Lipids, C

BC, Vit D, TSH,FreeT4, B12







Medical Equipment

No Medical Equipment data



Health Concerns Section

Health Concerns data not found



Goals Section

Goals data not found



Interventions Section

Interventions data not found



Health Status Evaluations/Outcomes Section

Health Status Evaluations/Outcomes data not found



Advance Directives

No Advance Directive data

## 2020-02-19 NOTE — XMS REPORT
CCD document using C-CDA

                             Created on: 2020



Leilani Ramírez

External Reference #: [id number]

: 1940

Sex: Female



Demographics





                          Address                   [address] [place]  [address]

 

                          Home Phone                [phone]

 

                          Preferred Language        Unknown

 

                          Marital Status            

 

                          Scientology Affiliation     Unknown

 

                          Race                      White

 

                          Ethnic Group              Not  or 





Author





                          Author                    Leilani Driver D.O.

 

                          Organization              [facility]

 

                          Address                   [address] [place]  [address]



 

                          Phone                     [phone]







Care Team Providers





                    Care Team Member Name Role                Phone

 

                    Akanksha Driver D.O. PP                  Unavailable

 

                          CCM                       Unavailable







Summary Purpose

Interface Exchange



Insurance Providers





             Payer name   Policy type / Coverage type Covered party ID Effective

 Begin Date 

Effective End Date

 

             [place] [place] B [id number]  2018   

  Unknown

 

             [employer] Senior Supplemental Medicare Part B [id number]   [id number]    

 Unknown







Family history





Father



                          Diagnosis                 Age At Onset

 

                          Heart disease             Unknown







Mother



                          Diagnosis                 Age At Onset

 

                          Heart disease             Unknown

 

                          Cancer                    Unknown







Social History





                Social History Element Codes           Description     Effective

 Dates

 

                Tobacco history SNOMED CT: 062107075 Never smoker    2011

 

                Marital status  Unknown         Single          2010

 

                Number of children Unknown         9               2010







Allergies, Adverse Reactions, Alerts





           Substance  Reaction   Codes      Entered Date Inactivated Date Status

 

           _                     Unknown    2019 No Inactive Date Active

 

           * NO KNOWN FOOD ALLERGIES            Unknown    2010 No Inactiv

e Date Active

 

           _                     Unknown    2010 No Inactive Date Active

 

           QUINIDINE-QUININE ANALOGUES hives,     Unknown    2010 No Inact

diamante Date Active







Problems





                Condition       Codes           Effective Dates Condition Status

 

                          Essential (primary) hypertension ICD-9: 401.9

ICD-10: I10               2014                Active

 

                          Palpitations              ICD-9: 785.1

ICD-10: R00.2             10/02/2018                Active

 

                          Stress reaction           ICD-9: 308.9

ICD-10: F43.0             2020                Active

 

                          Mixed hyperlipidemia      ICD-9: 272.4

ICD-10: E78.2             2019                Active

 

                          FLU VACCINE               ICD-9: V04.81

ICD-10: Z23               2012                Active

 

                          Acute serous otitis media, left ear ICD-9: 381.01

ICD-10: H65.02            2019                Active

 

                          Coronary atherosclerosis due to calcified coronary les

ion ICD-9: 414.00

ICD-10: I25.84            2018                Active

 

                          Hypoglycemia, unspecified ICD-9: 251.2

ICD-10: E16.2             2017                Active

 

                          Other fatigue             ICD-9: 780.79

ICD-10: R53.83            2017                Active

 

                          Urinary tract infection, site not specified ICD-9: 599

.0

ICD-10: N39.0             10/02/2018                Active

 

                          Gastro-esophageal reflux disease without esophagitis I

CD-9: 530.81

ICD-10: K21.9             2018                Active

 

                          Epigastric pain           ICD-9: 789.06

ICD-10: R10.13            2018                Active

 

                          Atherosclerotic heart disease of native coronary arter

y without angina pectoris 

ICD-9: 414.00

ICD-10: I25.10            2018                Active

 

                          Generalized anxiety disorder ICD-9: 300.00

ICD-10: F41.1             2018                Active

 

                          Dizziness and giddiness   ICD-9: 780.4

ICD-10: R42               2014                Active

 

                          Occlusion and stenosis of bilateral carotid arteries I

CD-9: 433.10

ICD-10: I65.23            2018                Active

 

                          Cervicalgia               ICD-9: 723.1

ICD-10: M54.2             2018                Active

 

                          Other spondylosis with radiculopathy, cervical region 

ICD-9: 721.0

ICD-10: M47.22            2018                Active

 

                          Cervicocranial syndrome   ICD-9: 723.2

ICD-10: M53.0             2018                Active

 

                          Vertigo of central origin, bilateral ICD-9: 386.2

ICD-10: H81.43            2018                Active

 

                          Benign paroxysmal vertigo, bilateral ICD-9: 386.11

ICD-10: H81.13            2018                Active

 

                          Otalgia, bilateral        ICD-9: 388.70

ICD-10: H92.03            2018                Active

 

                          Left lower quadrant pain  ICD-9: 789.04

ICD-10: R10.32            2017                Active

 

                          Low back pain             ICD-9: 724.2

ICD-10: M54.5             2017                Active

 

                          Radiculopathy, lumbosacral region ICD-9: 724.4

ICD-10: M54.17            2018                Active

 

                          Impaired fasting glucose  ICD-9: 790.29

ICD-10: R73.01            2012                Active

 

                          Other intervertebral disc degeneration, lumbar region 

ICD-9: 722.52

ICD-10: M51.36            2017                Active

 

                          Pain in thoracic spine    ICD-9: 724.1

ICD-10: M54.6             2017                Active

 

                          Mastodynia                ICD-9: 611.71

ICD-10: N64.4             2017                Active

 

                          Acute upper respiratory infection, unspecified ICD-9: 

465.9

ICD-10: J06.9             2017                Active

 

                          Acute mastoiditis without complications, right ear ICD

-9: 383.00

ICD-10: H70.001           2016                Active

 

                          Constipation, unspecified ICD-9: 564.00

ICD-10: K59.00            2016                Active

 

                          Chronic kidney disease, stage 1 ICD-9: 585.1

ICD-10: N18.1             03/15/2016                Active

 

                          History of falling        ICD-9: V15.88

ICD-10: Z91.81            12/15/2015                Active

 

                          Muscle weakness (generalized) ICD-9: 780.79

ICD-10: M62.81            2015                Active

 

                Acute renal failure ICD-9: 584.9    2015      Active

 

                POLYURIA        ICD-9: 788.42   2015      Active

 

                CAD             ICD-9: 414.00   09/10/2015      Active

 

                Hyperglycemia   ICD-9: 790.29   2012      Active

 

                HYPERLIPIDEMIA NEC/NOS ICD-9: 272.4    09/10/2015      Active

 

                ABDOMINAL PAIN  ICD-9: 789.00   10/10/2012      Active

 

                Grieving        ICD-9: 309.0    2015      Active

 

                HYPOGLYCEMIA    ICD-9: 251.2    2012      Active

 

                MALAISE AND FATIGUE ICD-9: 780.79   2015      Active

 

                CERUMEN IMPACTION ICD-9: 380.4    2015      Active

 

                Leg pain        ICD-9: 729.5    2015      Active

 

                SUPERFIC PHLEBITIS-LEG ICD-9: 451.0    2015      Active

 

                SPASM OF MUSCLE ICD-9: 728.85   2015      Active

 

                Thoracic back pain ICD-9: 724.1    2015      Active

 

                Benign positional vertigo ICD-9: 386.11   2015      Active

 

                Suspicious nevus ICD-9: 238.2    2015      Active

 

                EUSTACHIAN TUBE DYSFUNCTION ICD-9: 381.81   2014      Acti

ve

 

                SINUSITIS, ACUTE ICD-9: 461.9    2014      Active

 

                DIZZINESS/VERTIGO ICD-9: 780.4    2014      Active

 

                HYPERTENSION    ICD-9: 401.9    2014      Active

 

                URI, ACUTE      ICD-9: 465.9    10/15/2013      Active

 

                CEPHALGIA       ICD-9: 784.0    2013      Active

 

                OTITIS MEDIA NOS ICD-9: 382.9    2013      Active

 

                Perforation of ear drum ICD-9: 384.20   05/10/2013      Active

 

                Mastalgia       ICD-9: 611.71   2013      Active

 

                DYSPEPSIA       ICD-9: 536.8    10/10/2012      Active

 

                IBS             ICD-9: 564.1    10/10/2012      Active

 

                FLU VACCINE     ICD-9: V04.81   2012      Active

 

                Radiculopathy of leg ICD-9: 724.4    2012      Active

 

                SCIATICA        ICD-9: 724.3    2012      Active

 

                Lumbar degenerative disc disease ICD-9: 722.52   2012     

 Active

 

                Sacroiliac dysfunction ICD-9: 739.4    2012      Active

 

                Hypertension    Unknown         2012      Active

 

                PAIN, LOWER BACK ICD-9: 724.2    2011      Active

 

                SPINAL ENTHESOPATHY ICD-9: 720.1    2011      Active

 

                Hypotension     ICD-9: 458.9    2011      Active

 

                SACROILIITIS NEC ICD-9: 720.2    2011      Active

 

                PHARYNGITIS, ACUTE ICD-9: 462      2010      Active

 

                URINARY TRACT INFECTION ICD-9: 599.0    2010      Active

 

                Heat exhaustion ICD-9: 992.5    2010      Active

 

                Esophagitis     ICD-9: 530.10   2010      Active

 

                Gastritis       ICD-9: 535.50   2010      Active

 

                GERD            ICD-9: 530.81   2010      Active

 

                Hiatal hernia   ICD-9: 553.3    2010      Active

 

                B12 DEFIC ANEMIA NEC ICD-9: 281.1    2010      Active

 

                Anxiety         Unknown         2010      Active

 

                Heart disease   Unknown         2010      Active

 

                Hyperlipidemia  Unknown         2010      Active

 

                ANXIETY STATE NOS ICD-9: 300.00   2010      Active

 

                DEPRESSIVE DISORDER NEC ICD-9: 311      2010      Active







Medications





          Medication Codes     Instructions Start Date Stop Date Status    Fill 

Instructions

 

                    Flonase Allergy Relief 50 mcg/actuation nasal spray,suspensi

on RxNorm: 3578436     2

Spray Nasal every night at bedtime 2020      Inactive     

    

 

             simvastatin 40 mg tablet RxNorm: 259696 1 Tablet(s) Oral QD 

020   

10/19/2020                Active                     

 

                omeprazole 40 mg capsule,delayed release RxNorm: 905960  1 Capsu

le(s) Oral QD 

2020          10/19/2020          Active               

 

                    isosorbide mononitrate ER 30 mg tablet,extended release 24 h

r RxNorm: 697444      1 

Tablet(s) Oral every night at bedtime 2020      10/20/2020      Active    

       

 

                    metoprolol tartrate 25 mg tablet RxNorm: 018304      1 Table

t(s) Oral QAM and two 

tablets (50mg) in the evening 2020      Active           

 

                omeprazole 40 mg capsule,delayed release RxNorm: 795954  1 Capsu

le(s) Oral QD 

2020          Inactive             

 

                    Xanax 0.25 mg tablet RxNorm: 915139      TAKE 1 TABLET BY [facility] TWICE DAILY 1 

Tablet(s) Oral two times a day as needed as needed for anxiety 2020                Inactive                   

 

             simvastatin 40 mg tablet RxNorm: 402106 1 Tablet(s) Oral QD 

020   

2020                Inactive                   

 

                    metoprolol tartrate 25 mg tablet RxNorm: 717980      1 Table

t(s) Oral QAM and two 

tablets (50mg) in the evening 2020      Inactive         

 

                    metoprolol tartrate 25 mg tablet RxNorm: 644523      1 Table

t(s) Oral QAM and two 

tablets (50mg) in the evening 2020      Inactive         

 

                    Flonase Allergy Relief 50 mcg/actuation nasal spray,suspensi

on RxNorm: 9523126     2

Spray Nasal every night at bedtime 2020      Inactive     

    

 

           simvastatin 40 mg tablet RxNorm: 729367 1 Tablet(s) PO QD 2019 

Inactive                                 

 

                omeprazole 40 mg capsule,delayed release RxNorm: 878786  1 Capsu

le(s) Oral QD 

2019          Inactive             

 

                Xanax 0.25 mg tablet RxNorm: 849103  TAKE 1 TABLET BY MOUTH TWIC

E DAILY 

10/29/2019          2020          Inactive             

 

                omeprazole 40 mg capsule,delayed release RxNorm: 657724  1 Capsu

le(s) PO QD 

10/07/2019          2019          Inactive             

 

             metoprolol tartrate 25 mg tablet RxNorm: 401868 1 Tablet(s) PO BID 

2019                Inactive                   

 

                omeprazole 40 mg capsule,delayed release RxNorm: 405883  1 Capsu

le(s) PO QD 

2019          10/06/2019          Inactive             

 

                    Flonase Allergy Relief 50 mcg/actuation nasal spray,suspensi

on RxNorm: 4308468     2

[facility]AL QHS 2019      Inactive         

 

           simvastatin 40 mg tablet RxNorm: 023411 1 Tablet(s) PO QD 2019 

Inactive                                 

 

           simvastatin 40 mg tablet RxNorm: 882738 1 Tablet(s) PO QD 2019 

Inactive                                 

 

                omeprazole 40 mg capsule,delayed release RxNorm: 253972  1 Capsu

le(s) PO QD 

2019          Inactive             

 

           simvastatin 40 mg tablet RxNorm: 182477 1 Tablet(s) PO QD 2019 

Inactive                                 

 

                omeprazole 40 mg capsule,delayed release RxNorm: 598219  1 Capsu

le(s) PO QD 

2019          Inactive             

 

             Bactrim  mg-160 mg tablet RxNorm: 185548 1 Tablet(s) PO BID 0

3/08/2019   

2019                Inactive                   

 

             Bactrim  mg-160 mg tablet RxNorm: 047096 1 Tablet(s) PO BID 0

3/08/2019   

2019                Inactive                   

 

             Macrobid 100 mg capsule RxNorm: 203692 1 Capsule(s) PO BID 20

                          Inactive                   

 

             metoprolol tartrate 25 mg tablet RxNorm: 144408 1 Tablet(s) PO BID 

2019                Inactive                   

 

                Xanax 0.25 mg tablet RxNorm: 682491  TAKE 1 TABLET BY MOUTH TWIC

E DAILY 

2018          10/28/2019          Inactive             

 

           Xanax 0.25 mg tablet RxNorm: 711616 1 Tablet(s) PO BID 2018 

Inactive                                 

 

                    Protonix 40 mg tablet,delayed release RxNorm: 990675      1 

Tablet(s) PO BID for 

stomach--replaces omeprazole 2018      Inactive         

 

             Carafate 1 gram tablet RxNorm: 862674 1 Tablet(s) PO AC & HS 2019                Inactive                   

 

           Xanax 0.25 mg tablet RxNorm: 119509 1 Tablet(s) PO BID 10/30/2018 12/

10/2018 

Inactive                                 

 

             Bactrim  mg-160 mg tablet RxNorm: 268523 1 Tablet(s) PO BID 1

   

10/08/2018                Inactive                   

 

             Bactrim  mg-160 mg tablet RxNorm: 122006 1 Tablet(s) PO BID 1

   

10/03/2018                Inactive                   

 

             Carafate 1 gram tablet RxNorm: 214429 1 Tablet(s) PO AC & HS 09/18/

2018   

10/17/2018                Inactive                   

 

                    Protonix 40 mg tablet,delayed release RxNorm: 032493      1 

Tablet(s) PO BID for 

stomach--replaces omeprazole 2018      Inactive         

 

                    Protonix 40 mg tablet,delayed release RxNorm: 226768      1 

Tablet(s) PO BID for 

stomach--replaces omeprazole 2018      Inactive         

 

                    fluticasone 50 mcg/actuation nasal spray,suspension RxNorm: 

2615862     2 Spray 

NASAL QD to each nostril 2018      Inactive         

 

             Nasonex 50 mcg/actuation Spray RxNorm: 7834356 2 [facility]AL 2018                Inactive                   

 

                omeprazole 40 mg capsule,delayed release RxNorm: 105650  1 Capsu

le(s) PO QD 

2018          08/15/2018          Inactive             

 

                    simvastatin 40 mg tablet RxNorm: 005138      1 Tablet(s) PO 

QD TAKE 1 TABLET EVERY 

DAY             2018      Inactive         

 

             metoprolol tartrate 25 mg tablet RxNorm: 640659 1/2 Tablet(s) PO QD

 2018                Inactive                   

 

                simvastatin 40 mg tablet RxNorm: 223866  Tablet(s) TAKE 1 TABLET

 EVERY DAY 

2017          Inactive             

 

             metoprolol tartrate 25 mg tablet RxNorm: 441767 1/2 Tablet(s) PO QD

 2017                Inactive                   

 

                    Flonase 50 mcg/actuation nasal spray,suspension RxNorm: 1797

933     2 [facility]AL 

BID             2017      Inactive         

 

                omeprazole 40 mg capsule,delayed release RxNorm: 000743  1 Capsu

le(s) PO QD 

2017          Inactive             

 

             metoprolol tartrate 25 mg tablet RxNorm: 216161 1/2 Tablet(s) PO QD

 04/10/2017   

2017                Inactive                   

 

                    ciprofloxacin 0.2 % ear drops in a dropperette RxNorm: 64291

6      4 Drop(s) OTIC TID

for 1 week      2016      Inactive         

 

           cefdinir 300 mg capsule RxNorm: 522704 2 Capsule(s) PO QD 2016 

Inactive                                 

 

                    omeprazole 40 mg capsule,delayed release RxNorm: 324147     

 TAKE 1 CAPSULE EVERY DAY

                2016      Inactive         

 

             simvastatin 40 mg tablet RxNorm: 522320 TAKE 1 TABLET EVERY DAY 2017                Inactive                   

 

                    Flonase 50 mcg/actuation nasal spray,suspension RxNorm: 1797

933     2 [facility] 

BID             2015      Inactive         

 

             cefuroxime axetil 250 mg tablet RxNorm: 017599 1 Tablet(s) PO BID 0

2015                Inactive                   

 

             cefuroxime axetil 250 mg tablet RxNorm: 020904 1 Tablet(s) PO BID 0

2015                Inactive                   

 

                omeprazole 40 mg capsule,delayed release RxNorm: 056716  1 Capsu

le(s) PO QD 

2015          Inactive             

 

           meloxicam 7.5 mg tablet RxNorm: 410847 1 Tablet(s) PO QD 2015 0

2015 

Inactive                                 

 

                loratadine 10 mg tablet RxNorm: 461752  1 Tablet(s) PO QAM for a

llergies 

2014          Inactive             

 

                    Flonase 50 mcg/actuation nasal spray,suspension RxNorm: 8963

23      2 [facility]AL BID

                2014      12/15/2015      Inactive         

 

           prednisone 20 mg tablet RxNorm: 401390 2 Tablet(s) PO BID 2014 

Inactive                                 

 

             Dyazide 37.5 mg-25 mg capsule RxNorm: 857740 1 Capsule(s) PO QAM 2014                Inactive                   

 

           Ceftin 500 mg tablet RxNorm: 517070 1 Tablet(s) PO BID 2014 

Inactive                                 

 

           Ceftin 500 mg tablet RxNorm: 391441 1 Tablet(s) PO BID 2014 

Inactive                                 

 

                    simvastatin 40 mg tablet RxNorm: 497754      Tablet(s) PO TA

KE ONE TABLET BY MOUTH 

EVERY DAY       2014      Inactive         

 

                    metoprolol tartrate 25 mg tablet RxNorm: 945887      Tablet(

s) PO TAKE ONE-HALF 

TABLET BY MOUTH EVERY DAY 2014      Inactive         

 

           Ceftin 500 mg tablet RxNorm: 568698 1 Tablet(s) PO BID 10/15/2013 10/

 

Inactive                                 

 

                loratadine 10 mg tablet RxNorm: 085274  1 Tablet(s) PO QAM for a

llergies 

2013          Inactive             

 

                    omeprazole 20 mg capsule,delayed release RxNorm: 880980     

 Capsule(s) PO TAKE ONE 

CAPSULE BY MOUTH TWICE DAILY 2013      Inactive         

 

                omeprazole 20 mg capsule,delayed release RxNorm: 468561  1 Capsu

le(s) PO BID 

2013          Inactive             

 

             Augmentin 875 mg-125 mg tablet RxNorm: 070372 1 Tablet(s) PO Q12H 0

5/10/2013   

2013                Inactive                   

 

             Floxin Otic Drops 1 bottle Drops RxNorm:      5 Drop(s) OTIC BID 05

/10/2013   

2013                Inactive                   

 

                    metoprolol tartrate 25 mg tablet RxNorm: 206528      Tablet(

s) PO TAKE ONE-HALF 

TABLET BY MOUTH EVERY DAY 2013      Inactive         

 

                    simvastatin 40 mg tablet RxNorm: 678454      Tablet(s) PO TA

KE ONE TABLET BY MOUTH 

EVERY DAY       2013      Inactive         

 

                lancets         RxNorm:         Misc Miscellaneous USE ONE TO CH

YOGI GLUCOSE EVERY DAY 

2013          Inactive             

 

             Carafate 1 gram tablet RxNorm: 936054 1 Tablet(s) PO AC & HS 2015                Inactive                   

 

           Flagyl 500 mg tablet RxNorm: 272226 1 Tablet(s) PO TID 10/10/2012 10/

 

Inactive                                 

 

             Carafate 1 gram tablet RxNorm: 490138 1 Tablet(s) PO AC & HS 10/10/

2012   

2012                Inactive                   

 

          One Touch Test strips RxNorm:   Miscellaneous QD 2012

 Inactive  

one touch ultra mini test strips

 

           Protonix 40 mg Tab RxNorm: 686282 1 Tablet(s) PO QD 2012 

Inactive                                 

 

           Protonix 40 mg Tab RxNorm: 705514 1 Tablet(s) PO QD 2012 10/09/

2012 

Inactive                                 

 

           prednisone 20 mg Tab RxNorm: 101976 1 Tablet(s) PO BID 2012 

Inactive                                 

 

             Flexeril 5 mg Tab RxNorm: 009674 1 Tablet(s) PO QHS for spasm 2012                Inactive                   

 

             Flexeril 5 mg Tab RxNorm: 855548 1 Tablet(s) PO QHS for spasm 2012                Inactive                   

 

                amlodipine 2.5 mg Tab RxNorm: 297277  1/2 Tablet(s) PO QD replac

es 5mg dose 

2012          Inactive             

 

           simvastatin 40 mg tablet RxNorm: 350859 1 Tablet(s) PO QD 2012 

Inactive                                 

 

             metoprolol tartrate 25 mg tablet RxNorm: 735962 1/2 Tablet(s) PO QD

 2012                Inactive                   

 

                omeprazole 20 mg capsule,delayed release RxNorm: 173145  1 Capsu

le(s) PO BID 

2012          Inactive             

 

           cefdinir 300 mg Cap RxNorm: 344977 2 Capsule(s) PO QD 2011 

Inactive                                 

 

           simvastatin 40 mg Tab RxNorm: 817363 1 Tablet(s) PO QD 2011 

Inactive                                 

 

             metoprolol tartrate 25 mg Tab RxNorm: 320733 1/2 Tablet(s) PO QD 10

/   

2012                Inactive                   

 

             metoprolol tartrate 25 mg Tab RxNorm: 587154 1/2 Tablet(s) PO QD 10

/   

10/24/2011                Inactive                   

 

           meclizine 25 mg Tab RxNorm: 0744667 1 Tablet(s) PO QHS 2011 

Inactive                                for dizziness

 

           Ceftin 500 mg Tab RxNorm: 813058 1 Tablet(s) PO BID 2011 

Inactive                                 

 

                omeprazole 20 mg Cap, Delayed Release RxNorm: 819268  1 Capsule(

s) PO BID 

2011          10/24/2011          Inactive             

 

           simvastatin 40 mg Tab RxNorm: 342578 1 Tablet(s) PO QD 2011 

Inactive                                 

 

           simvastatin 40 mg Tab RxNorm: 497463 1 Tablet(s) PO QD 2011 

Inactive                                 

 

           simvastatin 40 mg Tab RxNorm: 967244 1 Tablet(s) PO QD 2010 

Inactive                                 

 

             Tessalon Perles 100 mg Cap RxNorm: 777677 1 Capsule(s) PO Q6-8H 2010                Inactive                   

 

           cefdinir 300 mg Cap RxNorm: 251975 1 Capsule(s) PO BID 2010 

Inactive                                 

 

           Lexapro 10 mg Tab RxNorm: 183708 1 Tablet(s) PO QD 2010

010 

Inactive                                 

 

           Cipro 250 mg Tab RxNorm: 289977 1 Tablet(s) PO BID 2010 10/04/2

010 

Inactive                                 

 

             Wellbutrin  mg 24 hr Tab RxNorm: 685628 1 Tablet(s) PO QAM 2010                Inactive                   

 

          Fish Oil 1,000 mg Cap RxNorm:   1 Capsule(s) PO QD No Start Date      

     Active     

 

                Tylenol Extra Strength 500 mg tablet RxNorm: 915137  1/2 Tablet(

s) PO as needed 

No Start Date                           Active               

 

                nitroglycerin 0.4 mg sublingual tablet RxNorm: 566274  Tablet(s)

 SL as needed No 

Start Date                              Active               

 

                    Calcium with Vitamin D 600 mg (1,500 mg)-400 unit tablet RxN

orm: 284324      1 

Tablet(s) PO QD No Start Date                   Active           

 

           Multivitamin & Mineral Formula Tab RxNorm:    1 Tablet(s) PO QD No St

art Date            

Active                                   

 

          vitamin B complex capsule RxNorm:   1 Capsule(s) PO QD No Start Date  

         Active     

 

          Aspirin 81 mg Tab RxNorm: 675815 1 Tablet(s) PO QD No Start Date      

     Active     

 

           Vitamin D 1,000 unit Cap RxNorm: 686389 1 Capsule(s) PO QD No Start D

ate            

Active                                   

 

          Co Q-10 oral RxNorm: 05713 oral      No Start Date           Active   

  

 

             metoprolol tartrate 25 mg Tab RxNorm: 106154 1/2 Tablet(s) PO QD No

 Start Date 

10/23/2011                Inactive                   

 

             Nasonex 50 mcg/actuation Spray RxNorm: 6487182 2 Spray NASAL No Sta

rt Date 

2018                Inactive                   

 

           Vitamin B12 1000mcg Tablet RxNorm:    1 Tablet(s) PO QD No Start Date

 2015 

Inactive                                 

 

           amlodipine 5 mg Tab RxNorm: 617485 1 Tablet(s) PO QD No Start Date  

Inactive                                 

 

             simvastatin 40 mg tablet RxNorm: 054673 1 Tablet(s) PO QD No Start 

Date 

2019                Inactive                   

 

                omeprazole 20 mg capsule,delayed release RxNorm: 003441  1 Capsu

le(s) PO BID No 

Start Date          2013          Inactive             

 

                omeprazole 40 mg capsule,delayed release RxNorm: 337948  1 Capsu

le(s) PO QD No 

Start Date          2019          Inactive             

 

           Nexium 40 mg Cap RxNorm: 372756 1 Capsule(s) PO QD No Start Date  

Inactive                                 

 

             Stool Softener 100 mg Tab RxNorm: 9290411 2 Tablet(s) PO BID No Sta

rt Date 

2012                Inactive                   

 

                    Calcium with Vitamin D 600 mg (1,500 mg)-400 unit Tab RxNorm

: 052351      1 Tablet(s)

PO QD           No Start Date   2015      Inactive         

 

             iron 325 mg (65 mg iron) Tab RxNorm: 759361 1 Tablet(s) PO QD No St

art Date 

2015                Inactive                   

 

                Flonase 50 mcg/actuation Nasal Spray RxNorm: 123878  2 [place] ROZ

AL BID No Start 

Date                2014          Inactive             

 

                    fluticasone 50 mcg/actuation nasal spray,suspension RxNorm: 

8283203     2 Spray 

NASAL QD to each nostril No Start Date   2018      Inactive         

 

             Nasonex 50 mcg/actuation Spray RxNorm: 3312799 2 Spray NASAL QD No 

Start Date 

2018                Inactive                   

 

                    hydralazine 50 mg tablet RxNorm: 048690      1 Tablet(s) PO 

as needed for BP over 

160/90          No Start Date   2018      Inactive         

 

             Vimovo 500 mg-20 mg 12 hr Tab RxNorm: 298295 1 Tablet(s) PO BID No 

Start Date 

2011                Inactive                   

 

             Tylenol PM 25 mg-500 mg/15 mL Oral Soln RxNorm: 3592292 1 PO QPM   

  No Start Date 

2015                Inactive                   

 

          Iron (Ferrous Sulfate) Oral RxNorm:   Oral      No Start Date 20

12 Inactive   

 

             Reglan 10 mg Tab RxNorm: 433392 1 Tablet(s) PO TID before meals No 

Start Date 

2011                Inactive                   

 

           Xanax 1 mg Tab RxNorm: 292636 1/2 Tablet(s) PO QD No Start Date  

Inactive                                 

 

             amlodipine 10 mg tablet RxNorm: 695719 1 Tablet(s) PO QD No Start D

ate 

2014                Inactive                   

 

          Iron (dried) Oral RxNorm:   Oral      No Start Date 2012 Inactiv

e   

 

                metoprolol tartrate 50 mg tablet RxNorm: 653071  1 Tablet(s) PO 

BID No Start Date

                    12/10/2018          Inactive             

 

           Xanax 0.25 mg tablet RxNorm: 248121 1 Tablet(s) PO BID No Start Date 

10/29/2018 

Inactive                                 

 

                lancets         RxNorm:         Miscellaneous check blood sugar 

at least once daily No Start 

Date                2013          Inactive             

 

           simvastatin 40 mg Tab RxNorm: 879324 1 Tablet(s) PO QD No Start Date 

2010 

Inactive                                 

 

                    isosorbide mononitrate ER 30 mg tablet,extended release 24 h

r RxNorm: 746087      1 

Tablet(s) PO QHS No Start Date   2019      Inactive         

 

             amlodipine 2.5 mg tablet RxNorm: 023584 1 Tablet(s) PO QD No Start 

Date 

2014                Inactive                   

 

                    isosorbide mononitrate ER 30 mg tablet,extended release 24 h

r RxNorm: 411147      1 

Tablet(s) PO QHS No Start Date   2020      Inactive         

 

             sucralfate 1 gram tablet RxNorm: 654720 1 Tablet(s) PO QID No Start

 Date 

2019                Inactive                   

 

          Fish Oil Oral RxNorm:   Oral      No Start Date 2012 Inactive   

 

          Multiple Vitamin Oral RxNorm:   Oral      No Start Date 2012 [facility]

ctive   

 

                metoprolol tartrate 25 mg tablet RxNorm: 292747  1/2 Tablet(s) P

O QD No Start 

Date                2017          Inactive             

 

                metoprolol tartrate 50 mg tablet RxNorm: 746860  1/2 Tablet(s) P

O BID No Start 

Date                2019          Inactive             

 

                    Flonase Allergy Relief 50 mcg/actuation nasal spray,suspensi

on RxNorm: 5242828     2

[place] NASAL QHS No Start Date   2019      Inactive         







Medication Administered

No Medication Administered data



Immunizations





                Vaccine         Codes           Date            Status

 

                Influenza       CVX: 135        10/22/2019      Complete

 

                Influenza       CVX: 135        10/22/2019      Complete

 

                Influenza       CVX: 135        2018      Complete

 

                Influenza       CVX: 135        10/06/2017      Complete

 

                Influenza       CVX: 135        10/07/2016      Complete

 

                Influenza       CVX: 135        10/16/2015       

 

                Influenza       CVX: 141        2013       

 

                Influenza (Adult) CVX: 141        10/06/2010       







Results





          Observation Observation Code Item      Item Code Result    Date      S

[facility] Location

 

          COMPLETE BLOOD COUNT [id number]   WBC                 7.1 10e9/L 20

20 Unknown

 

          COMPLETE BLOOD COUNT [id number]   RBC                 4.16 10e12/L 2020 Unknown

 

          COMPLETE BLOOD COUNT [id number]   HEMOGLOBIN           12.4 g/dL 20

20 Unknown

 

          COMPLETE BLOOD COUNT [id number]   HEMATOCRIT           39.3 %    20

20 Unknown

 

          COMPLETE BLOOD COUNT [id number]   MCV                 94.5 fL   

0 Unknown

 

          COMPLETE BLOOD COUNT [id number]   MCH                 29.8 pg   

0 Unknown

 

          COMPLETE BLOOD COUNT 6827705   MCHC                31.6 g/dL 

0 Unknown

 

          COMPLETE BLOOD COUNT [id number]   PLATELET COUNT           161 10e9/L 2020 Unknown

 

          COMPLETE BLOOD COUNT [id number]   Mean Plt Volume           10.8 fL   2020 Unknown

 

          COMPLETE BLOOD COUNT [id number]   Neut Auto           38.7 %    

0 Unknown

 

          COMPLETE BLOOD COUNT [id number]   Lymph Auto           48.0 %    20

20 Unknown

 

          COMPLETE BLOOD COUNT [id number]   Mono Auto           9.5 %     

0 Unknown

 

          COMPLETE BLOOD COUNT [id number]   RDW                 13.5 %    

0 Unknown

 

          COMPLETE BLOOD COUNT [id number]   Eos Auto            3.2 %     

0 Unknown

 

          COMPLETE BLOOD COUNT [id number]   Baso Auto           0.6 %     

0 Unknown

 

          COMPLETE BLOOD COUNT [id number]   Neutrophil Abs           2.75 10e9/L  Unknown

 

          COMPLETE BLOOD COUNT [id number]   Lymphocyte Abs           3.41 10e9/L  Unknown

 

          COMPLETE BLOOD COUNT [id number]   Monocyte Abs           0.67 10e9/L 2020 Unknown

 

          COMPLETE BLOOD COUNT [id number]   Eosinophil Abs           0.23 10e9/L  Unknown

 

          COMPLETE BLOOD COUNT [id number]   RDW-SD              44.7 fL   

0 Unknown

 

          COMPLETE BLOOD COUNT [id number]   Basophil Abs           0.04 10e9/L 2020 Unknown

 

          THYROID STIMULATING HORMONE 60173     TSH                 1.677 uIU/mL

 2020 Unknown

 

          COMPREHENSIVE METABOLIC 92508     AST                 19 U/L    2020 Unknown

 

          COMPREHENSIVE METABOLIC 90907     ALT                 12 U/L    2020 Unknown

 

          COMPREHENSIVE METABOLIC 47363     BUN                 17 mg/dL  2020 Unknown

 

          COMPREHENSIVE METABOLIC 76604     ALBUMIN             4.2 g/dL  2020 Unknown

 

          COMPREHENSIVE METABOLIC 59420     CHLORIDE            103 mmol/L  Unknown

 

          COMPREHENSIVE METABOLIC 27366     Bili Total           0.2 mg/dL  Unknown

 

          COMPREHENSIVE METABOLIC 48598     ALK PHOS            61 U/L    2020 Unknown

 

          COMPREHENSIVE METABOLIC 33707     SODIUM              140 mmol/L  Unknown

 

          COMPREHENSIVE METABOLIC 53194     CREATININE           0.95 mg/dL 2020 Unknown

 

          COMPREHENSIVE METABOLIC 98220     CALCIUM             9.4 mg/dL 2020 Unknown

 

          COMPREHENSIVE METABOLIC 25921     POTASSIUM           4.2 mmol/L  Unknown

 

          COMPREHENSIVE METABOLIC 30328     Total Protein           6.5 g/dL   Unknown

 

          COMPREHENSIVE METABOLIC 66683     Glucose             103 mg/dL 2020 Unknown

 

          COMPREHENSIVE METABOLIC 65594     Bicarbonate           26 mmol/L 2020 Unknown

 

          COMPREHENSIVE METABOLIC 24413     AGAP                11 mmol/L 2020 Unknown

 

          GFR CALC  8368470   GFR Non Afr Amr           57 mL/min 2020 Unk

nown

 

          GFR CALC  9945521   GFR Afr Amr           >60 mL/min 2020 Unknow

n

 

          GFR CALC  2799992   GFR Non Afr Amr           52 mL/min 10/11/2019 Unk

nown

 

          GFR CALC  3541794   GFR Afr Amr           >60 mL/min 10/11/2019 Unknow

n

 

          COMPREHENSIVE METABOLIC 65352     AST                 17 U/L    10/11/

2019 Unknown

 

          COMPREHENSIVE METABOLIC 12218     ALT                 11 U/L    10/11/

2019 Unknown

 

          COMPREHENSIVE METABOLIC 67557     BUN                 17 mg/dL  10/11/

2019 Unknown

 

          COMPREHENSIVE METABOLIC 26745     ALBUMIN             3.9 g/dL  10/11/

2019 Unknown

 

          COMPREHENSIVE METABOLIC 68199     CHLORIDE            108 mmol/L 10/11

/2019 Unknown

 

          COMPREHENSIVE METABOLIC 52646     Bili Total           0.5 mg/dL 10/11

/2019 Unknown

 

          COMPREHENSIVE METABOLIC 67035     ALK PHOS            72 U/L    10/11/

2019 Unknown

 

          COMPREHENSIVE METABOLIC 12471     SODIUM              142 mmol/L 10/11

/2019 Unknown

 

          COMPREHENSIVE METABOLIC 67707     CREATININE           1.02 mg/dL 10/1

2019 Unknown

 

          COMPREHENSIVE METABOLIC 39290     CALCIUM             9.3 mg/dL 10/11/

2019 Unknown

 

          COMPREHENSIVE METABOLIC 55581     POTASSIUM           4.0 mmol/L 10/11

/2019 Unknown

 

          COMPREHENSIVE METABOLIC 87192     Total Protein           6.2 g/dL  10

/2019 Unknown

 

          COMPREHENSIVE METABOLIC 60357     Glucose             93 mg/dL  10/11/

2019 Unknown

 

          COMPREHENSIVE METABOLIC 02609     Bicarbonate           27 mmol/L 10/1

2019 Unknown

 

          COMPREHENSIVE METABOLIC 55835     AGAP                7 mmol/L  10/11/

2019 Unknown

 

          GFR CALC  3125048   GFR Non Afr Amr           48 mL/min 06/10/2019 Unk

nown

 

          GFR CALC  6053941   GFR Afr Amr           59 mL/min 06/10/2019 Unknown

 

          THYROID STIMULATING HORMONE 13880     TSH                 1.936 uIU/mL

 06/10/2019 Unknown

 

          COMPREHENSIVE METABOLIC 21194     AST                 18 U/L    06/10/

2019 Unknown

 

          COMPREHENSIVE METABOLIC 14041     ALT                 11 U/L    06/10/

2019 Unknown

 

          COMPREHENSIVE METABOLIC 05406     BUN                 18 mg/dL  06/10/

2019 Unknown

 

          COMPREHENSIVE METABOLIC 05884     ALBUMIN             4.2 g/dL  06/10/

2019 Unknown

 

          COMPREHENSIVE METABOLIC 33418     CHLORIDE            109 mmol/L 06/10

/2019 Unknown

 

          COMPREHENSIVE METABOLIC 35344     Bili Total           0.4 mg/dL 06/10

/2019 Unknown

 

          COMPREHENSIVE METABOLIC 40727     ALK PHOS            64 U/L    06/10/

2019 Unknown

 

          COMPREHENSIVE METABOLIC 77135     SODIUM              142 mmol/L 06/10

/2019 Unknown

 

          COMPREHENSIVE METABOLIC 67615     CREATININE           1.09 mg/dL  Unknown

 

          COMPREHENSIVE METABOLIC 46825     CALCIUM             9.3 mg/dL 06/10/

2019 Unknown

 

          COMPREHENSIVE METABOLIC 27843     POTASSIUM           4.7 mmol/L 06/10

/2019 Unknown

 

          COMPREHENSIVE METABOLIC 66460     Total Protein           6.3 g/dL  06

/10/2019 Unknown

 

          COMPREHENSIVE METABOLIC 24074     Glucose             84 mg/dL  06/10/

2019 Unknown

 

          COMPREHENSIVE METABOLIC 33694     Bicarbonate           25 mmol/L  Unknown

 

          COMPREHENSIVE METABOLIC 00344     AGAP                8 mmol/L  06/10/

2019 Unknown

 

          COMPLETE BLOOD COUNT [id number]   WBC                 7.8 10e9/L 06/10/20

19 Unknown

 

          COMPLETE BLOOD COUNT [id number]   RBC                 4.04 10e12/L 06/10/

2019 Unknown

 

          COMPLETE BLOOD COUNT [id number]   HEMOGLOBIN           12.2 g/dL 06/10/20

19 Unknown

 

          COMPLETE BLOOD COUNT [id number]   HEMATOCRIT           38.6 %    06/10/20

19 Unknown

 

          COMPLETE BLOOD COUNT [id number]   MCV                 95.5 fL   06/10/201

9 Unknown

 

          COMPLETE BLOOD COUNT [id number]   MCH                 30.2 pg   06/10/201

9 Unknown

 

          COMPLETE BLOOD COUNT [id number]   MCHC                31.6 g/dL 06/10/201

9 Unknown

 

          COMPLETE BLOOD COUNT [id number]   PLATELET COUNT           215 10e9/L 06/

10/2019 Unknown

 

          COMPLETE BLOOD COUNT [id number]   Mean Plt Volume           11.2 fL   06/

10/2019 Unknown

 

          COMPLETE BLOOD COUNT [id number]   Neut Auto           45.7 %    06/10/201

9 Unknown

 

          COMPLETE BLOOD COUNT [id number]   Lymph Auto           42.1 %    06/10/20

19 Unknown

 

          COMPLETE BLOOD COUNT [id number]   Mono Auto           9.2 %     06/10/201

9 Unknown

 

          COMPLETE BLOOD COUNT [id number]   RDW                 13.4 %    06/10/201

9 Unknown

 

          COMPLETE BLOOD COUNT [id number]   Eos Auto            2.7 %     06/10/201

9 Unknown

 

          COMPLETE BLOOD COUNT [id number]   Baso Auto           0.3 %     06/10/201

9 Unknown

 

          COMPLETE BLOOD COUNT [id number]   Neutrophil Abs           3.56 10e9/L 06

/10/2019 Unknown

 

          COMPLETE BLOOD COUNT [id number]   Lymphocyte Abs           3.28 10e9/L 06

/10/2019 Unknown

 

          COMPLETE BLOOD COUNT [id number]   Monocyte Abs           0.72 10e9/L  Unknown

 

          COMPLETE BLOOD COUNT [id number]   Eosinophil Abs           0.21 10e9/L 06

/10/2019 Unknown

 

          COMPLETE BLOOD COUNT [id number]   RDW-SD              44.9 fL   06/10/201

9 Unknown

 

          COMPLETE BLOOD COUNT [id number]   Basophil Abs           0.02 10e9/L  Unknown

 

          COMPLETE BLOOD COUNT [id number]   WBC                 5.2 10e9/L 20

18 Unknown

 

          COMPLETE BLOOD COUNT [id number]   RBC                 4.21 10e12/L 2018 Unknown

 

          COMPLETE BLOOD COUNT [id number]   HEMOGLOBIN           12.8 g/dL 20

18 Unknown

 

          COMPLETE BLOOD COUNT [id number]   HEMATOCRIT           39.0 %    20

18 Unknown

 

          COMPLETE BLOOD COUNT [id number]   MCV                 92.6 fL   

8 Unknown

 

          COMPLETE BLOOD COUNT [id number]   MCH                 30.4 pg   

8 Unknown

 

          COMPLETE BLOOD COUNT [id number]   MCHC                32.8 g/dL 

8 Unknown

 

          COMPLETE BLOOD COUNT [id number]   PLATELET COUNT           202 10e9/L  Unknown

 

          COMPLETE BLOOD COUNT [id number]   Mean Plt Volume           10.7 fL    Unknown

 

          COMPLETE BLOOD COUNT [id number]   Neut Auto           40.9 %    

8 Unknown

 

          COMPLETE BLOOD COUNT [id number]   Lymph Auto           46.3 %    20

18 Unknown

 

          COMPLETE BLOOD COUNT [id number]   Mono Auto           9.3 %     

8 Unknown

 

          COMPLETE BLOOD COUNT [id number]   RDW                 13.7 %    

8 Unknown

 

          COMPLETE BLOOD COUNT [id number]   Eos Auto            2.9 %     

8 Unknown

 

          COMPLETE BLOOD COUNT [id number]   Baso Auto           0.6 %     

8 Unknown

 

          COMPLETE BLOOD COUNT [id number]   Neutrophil Abs           2.13 10e9/L  Unknown

 

          COMPLETE BLOOD COUNT [id number]   Lymphocyte Abs           2.41 10e9/L  Unknown

 

          COMPLETE BLOOD COUNT [id number]   Monocyte Abs           0.48 10e9/L  Unknown

 

          COMPLETE BLOOD COUNT [id number]   Eosinophil Abs           0.15 10e9/L  Unknown

 

          COMPLETE BLOOD COUNT [id number]   RDW-SD              45.2 fL   

8 Unknown

 

          COMPLETE BLOOD COUNT [id number]   Basophil Abs           0.03 10e9/L  Unknown

 

          [place] [address]     Glucose             118 mg/dL  Unknown

 

          [place] [address]     CREATININE           1.01 mg/dL  Unknown

 

          [place] [address]     BUN                 14 mg/dL   Unknown

 

          [place] [address]     SODIUM              141 mmol/L  Unknown

 

          [place] [address]     POTASSIUM           4.0 mmol/L  Unknown

 

          [place] [address]     CHLORIDE            108 mmol/L  Unknown

 

          [place] [address]     Bicarbonate           25 mmol/L  Unknown

 

          [place] [address]     AGAP                8 mmol/L   Unknown

 

          [place] [address]     CALCIUM             9.6 mg/dL  Unknown

 

          FREE T4   73781     T4 Free             1.23 ng/dL 2018 Unknown

 

          GFR CALC  6980705   GFR Non Afr Amr           53 mL/min 2018 Unk

nown

 

          GFR CALC  4920738   GFR Afr Amr           >60 mL/min 2018 Unknow

n

 

          THYROID STIMULATING HORMONE 05038     TSH                 2.124 uIU/mL

 2018 Unknown

 

          LIPID GROUP [id number]     Cholesterol           152 mg/dL 2018 Unkno

wn

 

          LIPID GROUP 94981     Triglyceride           151 mg/dL 2018 Unkn

own

 

          LIPID GROUP [id number]     HDL CHOLESTEROL           47 mg/dL  2018 U

nknown

 

          LIPID GROUP [id number]     Chol/HDL Ratio           3.23 ratio 2018 U

nknown

 

          LIPID GROUP 87329     NON-HDL Chol           105 mg/dL 2018 Unkn

own

 

          LIPID GROUP [id number]     LDL Cholesterol           75 mg/dL  2018 U

nknown

 

          ASSAY OF TROPONIN QUANT 85081     Troponin-I           <0.30 ng/mL  Unknown

 

          COMPREHENSIVE METABOLIC 93474     AST                 20 U/L    2018 Unknown

 

          COMPREHENSIVE METABOLIC 51021     ALT                 14 U/L    2018 Unknown

 

          COMPREHENSIVE METABOLIC 20189     BUN                 19 mg/dL  2018 Unknown

 

          COMPREHENSIVE METABOLIC 70480     ALBUMIN             4.2 g/dL  2018 Unknown

 

          COMPREHENSIVE METABOLIC 98821     CHLORIDE            102 mmol/L  Unknown

 

          COMPREHENSIVE METABOLIC 94413     Bili Total           0.4 mg/dL  Unknown

 

          COMPREHENSIVE METABOLIC 20343     ALK PHOS            66 U/L    2018 Unknown

 

          COMPREHENSIVE METABOLIC 16896     SODIUM              135 mmol/L  Unknown

 

          COMPREHENSIVE METABOLIC 95005     CREATININE           1.01 mg/dL  Unknown

 

          COMPREHENSIVE METABOLIC 14248     CALCIUM             9.3 mg/dL 2018 Unknown

 

          COMPREHENSIVE METABOLIC 57676     POTASSIUM           4.8 mmol/L  Unknown

 

          COMPREHENSIVE METABOLIC 05676     Total Protein           7.0 g/dL   Unknown

 

          COMPREHENSIVE METABOLIC 73989     Glucose             91 mg/dL  2018 Unknown

 

          COMPREHENSIVE METABOLIC 09948     Bicarbonate           23 mmol/L  Unknown

 

          COMPREHENSIVE METABOLIC 45140     AGAP                10 mmol/L 2018 Unknown

 

          COMPLETE BLOOD COUNT [id number]   WBC                 7.5 10e9/L 20

18 Unknown

 

          COMPLETE BLOOD COUNT [id number]   RBC                 4.13 10e12/L 2018 Unknown

 

          COMPLETE BLOOD COUNT [id number]   HEMOGLOBIN           12.6 g/dL 20

18 Unknown

 

          COMPLETE BLOOD COUNT [id number]   HEMATOCRIT           38.4 %    20

18 Unknown

 

          COMPLETE BLOOD COUNT [id number]   MCV                 93.0 fL   

8 Unknown

 

          COMPLETE BLOOD COUNT [id number]   MCH                 30.5 pg   

8 Unknown

 

          COMPLETE BLOOD COUNT [id number]   MCHC                32.8 g/dL 

8 Unknown

 

          COMPLETE BLOOD COUNT [id number]   PLATELET COUNT           204 10e9/L  Unknown

 

          COMPLETE BLOOD COUNT [id number]   Mean Plt Volume           10.9 fL    Unknown

 

          COMPLETE BLOOD COUNT [id number]   Neut Auto           43.1 %    

8 Unknown

 

          COMPLETE BLOOD COUNT [id number]   Lymph Auto           45.0 %    20

18 Unknown

 

          COMPLETE BLOOD COUNT [id number]   Mono Auto           9.2 %     

8 Unknown

 

          COMPLETE BLOOD COUNT [id number]   RDW                 13.4 %    

8 Unknown

 

          COMPLETE BLOOD COUNT [id number]   Eos Auto            2.3 %     

8 Unknown

 

          COMPLETE BLOOD COUNT [id number]   Baso Auto           0.4 %     

8 Unknown

 

          COMPLETE BLOOD COUNT [id number]   Neutrophil Abs           3.23 10e9/L  Unknown

 

          COMPLETE BLOOD COUNT [id number]   Lymphocyte Abs           3.38 10e9/L  Unknown

 

          COMPLETE BLOOD COUNT [id number]   Monocyte Abs           0.69 10e9/L  Unknown

 

          COMPLETE BLOOD COUNT 6647530   Eosinophil Abs           0.17 10e9/L  Unknown

 

          COMPLETE BLOOD COUNT [id number]   RDW-SD              44.4 fL   

8 Unknown

 

          COMPLETE BLOOD COUNT [id number]   Basophil Abs           0.03 10e9/L  Unknown

 

          GFR CALC  8856571   GFR Non Afr Amr           53 mL/min 2018 Unk

nown

 

          GFR CALC  3560784   GFR Afr Amr           >60 mL/min 2018 Unknow

n

 

          GLYCOSYLATED HEMOGLOBIN TEST 96989     Hgb A1c   52948-1   5.4 %     0

2018 Unknown

 

          MEAN GLUC 5720142   Calc Mean Gluc           108 mg/dL 2018 Unkn

own

 

          MEAN GLUC 8466543   Calc Mean Gluc           114 mg/dL 2017 Unkn

own

 

          LIPID GROUP 38055     Cholesterol           146 mg/dL 2017 Unkno

wn

 

          LIPID GROUP 10930     Triglyceride           119 mg/dL 2017 Unkn

own

 

          LIPID GROUP 73439     HDL CHOLESTEROL           47 mg/dL  2017 U

nknown

 

          LIPID GROUP 11599     Chol/HDL Ratio           3.11 ratio 2017 U

nknown

 

          LIPID GROUP 42999     NON-HDL Chol           99 mg/dL  2017 Unkn

own

 

          LIPID GROUP 97579     LDL Cholesterol           75 mg/dL  2017 U

nknown

 

          GLYCOSYLATED HEMOGLOBIN TEST 82159     Hgb A1c   39686-4   5.6 %     0

2017 Unknown

 

          COMPREHENSIVE METABOLIC 81983     AST                 22 U/L    2017 Unknown

 

          COMPREHENSIVE METABOLIC 06105     ALT                 12 U/L    2017 Unknown

 

          COMPREHENSIVE METABOLIC 66966     BUN                 17 mg/dL  2017 Unknown

 

          COMPREHENSIVE METABOLIC 54863     ALBUMIN             4.0 g/dL  2017 Unknown

 

          COMPREHENSIVE METABOLIC 43695     CHLORIDE            110 mmol/L  Unknown

 

          COMPREHENSIVE METABOLIC 93812     Bili Total           0.4 mg/dL  Unknown

 

          COMPREHENSIVE METABOLIC 43350     ALK PHOS            63 U/L    2017 Unknown

 

          COMPREHENSIVE METABOLIC 41065     SODIUM              140 mmol/L  Unknown

 

          COMPREHENSIVE METABOLIC 28510     CREATININE           1.05 mg/dL  Unknown

 

          COMPREHENSIVE METABOLIC 48995     CALCIUM             9.2 mg/dL 2017 Unknown

 

          COMPREHENSIVE METABOLIC 04278     POTASSIUM           4.2 mmol/L  Unknown

 

          COMPREHENSIVE METABOLIC 00809     Total Protein           6.2 g/dL   Unknown

 

          COMPREHENSIVE METABOLIC 11278     Glucose             87 mg/dL  2017 Unknown

 

          COMPREHENSIVE METABOLIC 42863     Bicarbonate           24 mmol/L  Unknown

 

          COMPREHENSIVE METABOLIC 77553     AGAP                6 mmol/L  2017 Unknown

 

          GFR CALC  0878512   GFR Non Afr Amr           51 mL/min 2017 Unk

nown

 

          GFR CALC  8558634   GFR Afr Amr           >60 mL/min 2017 Unknow

n

 

          COMPLETE BLOOD COUNT [id number]   WBC                 6.7 10e9/L 20

17 Unknown

 

          COMPLETE BLOOD COUNT [id number]   RBC                 4.04 10e12/L 2017 Unknown

 

          COMPLETE BLOOD COUNT 7161247   HEMOGLOBIN           12.1 g/dL 20

17 Unknown

 

          COMPLETE BLOOD COUNT [id number]   HEMATOCRIT           38.0 %    20

17 Unknown

 

          COMPLETE BLOOD COUNT 0670969   MCV                 94.1 fL   

7 Unknown

 

          COMPLETE BLOOD COUNT 9465818   MCH                 30.0 pg   

7 Unknown

 

          COMPLETE BLOOD COUNT 6145074   MCHC                31.8 g/dL 

7 Unknown

 

          COMPLETE BLOOD COUNT [id number]   PLATELET COUNT           206 10e9/L  Unknown

 

          COMPLETE BLOOD COUNT 9351051   Mean Plt Volume           11.3 fL    Unknown

 

          COMPLETE BLOOD COUNT [id number]   Neut Auto           35.8 %    

7 Unknown

 

          COMPLETE BLOOD COUNT [id number]   Lymph Auto           51.6 %    20

17 Unknown

 

          COMPLETE BLOOD COUNT [id number]   Mono Auto           8.8 %     

7 Unknown

 

          COMPLETE BLOOD COUNT [id number]   RDW                 13.5 %    

7 Unknown

 

          COMPLETE BLOOD COUNT [id number]   Eos Auto            3.4 %     

7 Unknown

 

          COMPLETE BLOOD COUNT [id number]   Baso Auto           0.4 %     

7 Unknown

 

          COMPLETE BLOOD COUNT [id number]   Neutrophil Abs           2.40 10e9/L  Unknown

 

          COMPLETE BLOOD COUNT [id number]   Lymphocyte Abs           3.46 10e9/L  Unknown

 

          COMPLETE BLOOD COUNT [id number]   Monocyte Abs           0.59 10e9/L  Unknown

 

          COMPLETE BLOOD COUNT [id number]   Eosinophil Abs           0.23 10e9/L  Unknown

 

          COMPLETE BLOOD COUNT 8761409   RDW-SD              45.3 fL   

7 Unknown

 

          COMPLETE BLOOD COUNT [id number]   Basophil Abs           0.03 10e9/L  Unknown

 

          THYROID STIMULATING HORMONE 27893     TSH                 1.981 uIU/mL

 2017 Unknown

 

          COMPLETE BLOOD COUNT [id number]   WBC                 6.0 10e9/L 20

17 Unknown

 

          COMPLETE BLOOD COUNT [id number]   RBC                 4.29 10e12/L 2017 Unknown

 

          COMPLETE BLOOD COUNT 8771592   HEMOGLOBIN           12.9 g/dL 20

17 Unknown

 

          COMPLETE BLOOD COUNT [id number]   HEMATOCRIT           38.4 %    20

17 Unknown

 

          COMPLETE BLOOD COUNT [id number]   MCV                 89.5 fL   

7 Unknown

 

          COMPLETE BLOOD COUNT [id number]   MCH                 30.1 pg   

7 Unknown

 

          COMPLETE BLOOD COUNT 8237000   MCHC                33.6 g/dL 

7 Unknown

 

          COMPLETE BLOOD COUNT 8164798   PLATELET COUNT           181 10e9/L 2017 Unknown

 

          COMPLETE BLOOD COUNT [id number]   Mean Plt Volume           11.7 fL   2017 Unknown

 

          COMPLETE BLOOD COUNT 7047089   Neut Auto           36.9 %    

7 Unknown

 

          COMPLETE BLOOD COUNT [id number]   Lymph Auto           50.4 %    20

17 Unknown

 

          COMPLETE BLOOD COUNT 8693900   Mono Auto           9.0 %     

7 Unknown

 

          COMPLETE BLOOD COUNT [id number]   RDW                 13.7 %    

7 Unknown

 

          COMPLETE BLOOD COUNT 9178396   Eos Auto            3.4 %     

7 Unknown

 

          COMPLETE BLOOD COUNT [id number]   Baso Auto           0.3 %     

7 Unknown

 

          COMPLETE BLOOD COUNT [id number]   Neutrophil Abs           2.21 10e9/L  Unknown

 

          COMPLETE BLOOD COUNT [id number]   Lymphocyte Abs           3.02 10e9/L  Unknown

 

          COMPLETE BLOOD COUNT 7970656   Monocyte Abs           0.54 10e9/L 2017 Unknown

 

          COMPLETE BLOOD COUNT [id number]   Eosinophil Abs           0.20 10e9/L  Unknown

 

          COMPLETE BLOOD COUNT 7416075   RDW-SD              44.0 fL   

7 Unknown

 

          COMPLETE BLOOD COUNT 0038132   Basophil Abs           0.02 10e9/L 2017 Unknown

 

          GLYCOSYLATED HEMOGLOBIN TEST 44909     Hgb A1c   52185-4   5.4 %     0

3/09/2017 Unknown

 

          THYROID STIMULATING HORMONE 24626     TSH                 2.200 uIU/mL

 2017 Unknown

 

          GFR CALC  0795754   GFR Non Afr Amr           50 mL/min 2017 Unk

nown

 

          GFR CALC  3755894   GFR Afr Amr           >60 mL/min 2017 Unknow

n

 

          MEAN GLUC 4871681   Calc Mean Gluc           108 mg/dL 2017 Unkn

own

 

          COMPREHENSIVE METABOLIC 83371     AST                 18 U/L    2017 Unknown

 

          COMPREHENSIVE METABOLIC 06237     ALT                 10 U/L    2017 Unknown

 

          COMPREHENSIVE METABOLIC 80039     BUN                 20 mg/dL  2017 Unknown

 

          COMPREHENSIVE METABOLIC 30457     ALBUMIN             4.1 g/dL  2017 Unknown

 

          COMPREHENSIVE METABOLIC 25216     CHLORIDE            109 mmol/L  Unknown

 

          COMPREHENSIVE METABOLIC 58532     Bili Total           0.6 mg/dL  Unknown

 

          COMPREHENSIVE METABOLIC 67180     ALK PHOS            64 U/L    2017 Unknown

 

          COMPREHENSIVE METABOLIC 55339     SODIUM              141 mmol/L  Unknown

 

          COMPREHENSIVE METABOLIC 26788     CREATININE           1.06 mg/dL 2017 Unknown

 

          COMPREHENSIVE METABOLIC 24745     CALCIUM             9.9 mg/dL 2017 Unknown

 

          COMPREHENSIVE METABOLIC 64591     POTASSIUM           4.2 mmol/L  Unknown

 

          COMPREHENSIVE METABOLIC 26177     Total Protein           6.3 g/dL   Unknown

 

          COMPREHENSIVE METABOLIC 22334     Glucose             99 mg/dL  2017 Unknown

 

          COMPREHENSIVE METABOLIC 30805     Bicarbonate           21 mmol/L 2017 Unknown

 

          COMPREHENSIVE METABOLIC 09202     AGAP                11 mmol/L 2017 Unknown

 

          LIPID GROUP 20600     Cholesterol           169 mg/dL 2016 Unkno

wn

 

          LIPID GROUP 83186     Triglyceride           165 mg/dL 2016 Unkn

own

 

          LIPID GROUP 35100     HDL CHOLESTEROL           43 mg/dL  2016 U

nknown

 

          LIPID GROUP 40185     Chol/HDL Ratio           3.93 ratio 2016 U

nknown

 

          LIPID GROUP 87898     NON-HDL Chol           126 mg/dL 2016 Unkn

own

 

          LIPID GROUP 73739     LDL Cholesterol           93 mg/dL  2016 U

[facility]

 

          COMPREHENSIVE METABOLIC 89067     AST                 18 U/L    2016 Unknown

 

          COMPREHENSIVE METABOLIC 30603     ALT                 10 U/L    2016 Unknown

 

          COMPREHENSIVE METABOLIC 75675     BUN                 20 mg/dL  2016 Unknown

 

          COMPREHENSIVE METABOLIC 17954     ALBUMIN             3.9 g/dL  2016 Unknown

 

          COMPREHENSIVE METABOLIC 83844     CHLORIDE            110 mmol/L  Unknown

 

          COMPREHENSIVE METABOLIC 42925     Bili Total           0.5 mg/dL  Unknown

 

          COMPREHENSIVE METABOLIC 49253     ALK PHOS            72 U/L    2016 Unknown

 

          COMPREHENSIVE METABOLIC 81434     SODIUM              141 mmol/L  Unknown

 

          COMPREHENSIVE METABOLIC 57584     CREATININE           1.12 mg/dL  Unknown

 

          COMPREHENSIVE METABOLIC 76583     CALCIUM             9.7 mg/dL 2016 Unknown

 

          COMPREHENSIVE METABOLIC 09162     POTASSIUM           4.4 mmol/L  Unknown

 

          COMPREHENSIVE METABOLIC 80102     Total Protein           6.2 g/dL   Unknown

 

          COMPREHENSIVE METABOLIC 16299     Glucose             90 mg/dL  2016 Unknown

 

          COMPREHENSIVE METABOLIC 32847     Bicarbonate           23 mmol/L  Unknown

 

          COMPREHENSIVE METABOLIC 72961     AGAP                8 mmol/L  2016 Unknown

 

          GFR CALC  1241204   GFR Non Afr Amr           47 mL/min 2016 Unk

nown

 

          GFR CALC  6352041   GFR Afr Amr           57 mL/min 2016 Unknown

 

          GLYCOSYLATED HEMOGLOBIN TEST 19929     Hgb A1c   61324-9   5.5 %     0

2016 Unknown

 

          THYROID STIMULATING HORMONE 31558     TSH                 2.537 uIU/mL

 2016 Unknown

 

          FREE T4   11731     T4 Free             1.36 ng/dL 2016 Unknown

 

          COMPLETE BLOOD COUNT 0814851   WBC                 6.8 10e9/L 20

16 Unknown

 

          COMPLETE BLOOD COUNT 4061677   RBC                 4.20 10e12/L 2016 Unknown

 

          COMPLETE BLOOD COUNT 0475201   HEMOGLOBIN           12.5 g/dL 20

16 Unknown

 

          COMPLETE BLOOD COUNT 7672720   HEMATOCRIT           38.0 %    20

16 Unknown

 

          COMPLETE BLOOD COUNT 4144255   MCV                 90.5 fL   

6 Unknown

 

          COMPLETE BLOOD COUNT 3809928   MCH                 29.8 pg   

6 Unknown

 

          COMPLETE BLOOD COUNT 8084255   MCHC                32.9 g/dL 

6 Unknown

 

          COMPLETE BLOOD COUNT 2650482   PLATELET COUNT           197 10e9/L  Unknown

 

          COMPLETE BLOOD COUNT 0123006   Mean Plt Volume           11.7 fL    Unknown

 

          COMPLETE BLOOD COUNT 9127548   Neut Auto           41.3 %    

6 Unknown

 

          COMPLETE BLOOD COUNT 6556708   Lymph Auto           47.1 %    20

16 Unknown

 

          COMPLETE BLOOD COUNT [id number]   Mono Auto           7.8 %     

6 Unknown

 

          COMPLETE BLOOD COUNT [id number]   RDW                 13.8 %    

6 Unknown

 

          COMPLETE BLOOD COUNT 1155732   Eos Auto            3.4 %     

6 Unknown

 

          COMPLETE BLOOD COUNT [id number]   Baso Auto           0.4 %     

6 Unknown

 

          COMPLETE BLOOD COUNT 4679781   Neutrophil Abs           2.81 10e9/L  Unknown

 

          COMPLETE BLOOD COUNT [id number]   Lymphocyte Abs           3.20 10e9/L  Unknown

 

          COMPLETE BLOOD COUNT 9091232   Monocyte Abs           0.53 10e9/L  Unknown

 

          COMPLETE BLOOD COUNT [id number]   Eosinophil Abs           0.23 10e9/L  Unknown

 

          COMPLETE BLOOD COUNT 5544589   RDW-SD              44.4 fL   

6 Unknown

 

          COMPLETE BLOOD COUNT [id number]   Basophil Abs           0.03 10e9/L  Unknown

 

          MEAN GLUC 5335705   Calc Mean Gluc           111 mg/dL 2016 Unkn

own

 

          [place] [address]     Glucose             89 MG/DL   Unknown

 

          [place] [address]     CREATININE           1.12 MG/DL  Unknown

 

          [place] [address]     BUN                 20 MG/DL   Unknown

 

          [place] [address]     SODIUM              139 MMOL/L  Unknown

 

          [place] [address]     POTASSIUM           4.6 MMOL/L  Unknown

 

          [place] [address]     CHLORIDE            108 MMOL/L  Unknown

 

          [place] [address]     BICARB              26 MMOL/L  Unknown

 

          [place] [address]     ANION GAP           5 MEQ/L    Unknown

 

          [place] [address]     CALCIUM             10.0 MG/DL  Unknown

 

          GFR CALC  6128524   GFR AA              57.0L ML/MIN 2015 Unknow

n

 

          GFR CALC  2833153   GFR NON-AA           47.0L ML/MIN 2015 Unkno

wn

 

          THYROID STIMULATING HORMONE 45989     TSH                 2.378 uIU/ML

 09/10/2015 Unknown

 

          COMPLETE BLOOD COUNT 6273957   WBC                 6.4 10e9/L 09/10/20

15 Unknown

 

          COMPLETE BLOOD COUNT 6702950   RBC                 3.99 10e12/L 09/10/

2015 Unknown

 

          COMPLETE BLOOD COUNT [id number]   HGB                 11.9 g/dL 09/10/201

5 Unknown

 

          COMPLETE BLOOD COUNT [id number]   HCT DET             36.9 %    09/10/201

5 Unknown

 

          COMPLETE BLOOD COUNT [id number]   MCV                 92.5 fL   09/10/201

5 Unknown

 

          COMPLETE BLOOD COUNT [id number]   MCH                 29.8 pg   09/10/201

5 Unknown

 

          COMPLETE BLOOD COUNT 5882221   MCHC                32.2 g/dL 09/10/201

5 Unknown

 

          COMPLETE BLOOD COUNT [id number]   PLT                 172 10e9/L 09/10/20

15 Unknown

 

          COMPLETE BLOOD COUNT [id number]   MPV                 11.7 fL   09/10/201

5 Unknown

 

          COMPLETE BLOOD COUNT [id number]   ARIK %               40.4 %    09/10/201

5 Unknown

 

          COMPLETE BLOOD COUNT [id number]   LY %                48.0 %    09/10/201

5 Unknown

 

          COMPLETE BLOOD COUNT [id number]   MON %               8.3 %     09/10/201

5 Unknown

 

          COMPLETE BLOOD COUNT [id number]   EOS  %              2.8 %     09/10/201

5 Unknown

 

          COMPLETE BLOOD COUNT [id number]   BASO %              0.5 %     09/10/201

5 Unknown

 

          COMPLETE BLOOD COUNT [id number]   RDW                 13.6 %    09/10/201

5 Unknown

 

          COMPLETE BLOOD COUNT 6605806   ABS ARIK             2.59 10e9/L 09/10/2

015 Unknown

 

          COMPLETE BLOOD COUNT [id number]   ABS LYMPH           3.07 10e9/L 09/10/2

015 Unknown

 

          COMPLETE BLOOD COUNT [id number]   ABS MONO            0.53 10e9/L 09/10/2

015 Unknown

 

          COMPLETE BLOOD COUNT [id number]   ABS EOS             0.18 10e9/L 09/10/2

015 Unknown

 

          COMPLETE BLOOD COUNT [id number]   ABS BASO            0.03 10e9/L 09/10/2

015 Unknown

 

          COMPLETE BLOOD COUNT [id number]   RDW-SD              44.9 fL   09/10/201

5 Unknown

 

          LIPID GROUP 10150     HDL TEST            42 MG/DL  09/10/2015 Unknown

 

          LIPID GROUP 94596     TRIG                177 MG/DL 09/10/2015 Unknown

 

          LIPID GROUP 25821     TEST LDL            72 MG/DL  09/10/2015 Unknown

 

          LIPID GROUP 12982     CHOL                149 MG/DL 09/10/2015 Unknown

 

          LIPID GROUP 81657     RCHOL/HDL           3.55 RATIO 09/10/2015 Unknow

n

 

          LIPID GROUP 76957     NON-HDL CH           107 MG/DL 09/10/2015 Unknow

n

 

          GLYCOSYLATED HEMOGLOBIN TEST 49834     A1C HPLC  81067-4   5.5 %     0

9/10/2015 Unknown

 

          FREE T4   11563     FREE T4             1.39 NG/DL 09/10/2015 Unknown

 

          GFR CALC  6368650   GFR AA              55.0L ML/MIN 09/10/2015 Unknow

n

 

          GFR CALC  7625678   GFR NON-AA           46.0L ML/MIN 09/10/2015 Unkno

wn

 

          COMPREHENSIVE METABOLIC 22900     AST                 17 U/L    09/10/

2015 Unknown

 

          COMPREHENSIVE METABOLIC 91184     ALT                 10 IU/L   09/10/

2015 Unknown

 

          COMPREHENSIVE METABOLIC 49791     BUN                 20 MG/DL  09/10/

2015 Unknown

 

          COMPREHENSIVE METABOLIC 99512     ALBUMIN             3.9 GM/DL 09/10/

2015 Unknown

 

          COMPREHENSIVE METABOLIC 80451     CHLORIDE            111 MMOL/L 09/10

/2015 Unknown

 

          COMPREHENSIVE METABOLIC 91929     BILI TOT            0.4 MG/DL 09/10/

2015 Unknown

 

          COMPREHENSIVE METABOLIC 47034     ALK PHOS            70 U/L    09/10/

2015 Unknown

 

          COMPREHENSIVE METABOLIC 71326     SODIUM              142 MMOL/L 09/10

/2015 Unknown

 

          COMPREHENSIVE METABOLIC 88878     CREATININE           1.16 MG/DL  Unknown

 

          COMPREHENSIVE METABOLIC 23679     CALCIUM             9.4 MG/DL 09/10/

2015 Unknown

 

          COMPREHENSIVE METABOLIC 81728     POTASSIUM           4.6 MMOL/L 09/10

/2015 Unknown

 

          COMPREHENSIVE METABOLIC 40703     PROT TOT            6.2 GM/DL 09/10/

2015 Unknown

 

          COMPREHENSIVE METABOLIC 96076     Glucose             90 MG/DL  09/10/

2015 Unknown

 

          COMPREHENSIVE METABOLIC 38462     BICARB              24 MMOL/L 09/10/

2015 Unknown

 

          COMPREHENSIVE METABOLIC 28569     ANION GAP           7 MEQ/L   09/10/

2015 Unknown

 

          THYROID STIMULATING HORMONE 63000     TSH                 2.427 uIU/ML

 2015 Unknown

 

          LIPID GROUP 29522     HDL TEST            47 MG/DL  2015 Unknown

 

          LIPID GROUP 68130     TRIG                145 MG/DL 2015 Unknown

 

          LIPID GROUP 11739     TEST LDL            73 MG/DL  2015 Unknown

 

          LIPID GROUP 26770     CHOL                149 MG/DL 2015 Unknown

 

          LIPID GROUP 52066     RCHOL/HDL           3.17 RATIO 2015 Unknow

n

 

          LIPID GROUP 23450     NON-HDL CH           102 MG/DL 2015 Unknow

n

 

          COMPREHENSIVE METABOLIC 78564     AST                 17 U/L    2015 Unknown

 

          COMPREHENSIVE METABOLIC 46071     ALT                 9 IU/L    2015 Unknown

 

          COMPREHENSIVE METABOLIC 83397     BUN                 19 MG/DL  2015 Unknown

 

          COMPREHENSIVE METABOLIC 39488     ALBUMIN             4.3 GM/DL 2015 Unknown

 

          COMPREHENSIVE METABOLIC 60252     CHLORIDE            108 MMOL/L  Unknown

 

          COMPREHENSIVE METABOLIC 01498     BILI TOT            0.5 MG/DL 2015 Unknown

 

          COMPREHENSIVE METABOLIC 60559     ALK PHOS            68 U/L    2015 Unknown

 

          COMPREHENSIVE METABOLIC 76208     SODIUM              140 MMOL/L  Unknown

 

          COMPREHENSIVE METABOLIC 26823     CREATININE           1.08 MG/DL 2015 Unknown

 

          COMPREHENSIVE METABOLIC 82640     CALCIUM             9.9 MG/DL 2015 Unknown

 

          COMPREHENSIVE METABOLIC 24658     POTASSIUM           4.3 MMOL/L  Unknown

 

          COMPREHENSIVE METABOLIC 12888     PROT TOT            7.2 GM/DL 2015 Unknown

 

          COMPREHENSIVE METABOLIC 08337     Glucose             94 MG/DL  2015 Unknown

 

          COMPREHENSIVE METABOLIC 99443     BICARB              26 MMOL/L 2015 Unknown

 

          COMPREHENSIVE METABOLIC 78143     ANION GAP           6 MEQ/L   2015 Unknown

 

          GFR CALC  7213513   GFR AA              60.0L ML/MIN 2015 Unknow

n

 

          GFR CALC  8858396   GFR NON-AA           49.0L ML/MIN 2015 Unkno

wn

 

          GLYCOSYLATED HEMOGLOBIN TEST 50126     A1C HPLC  57719-8   5.6 %     0

3/06/2015 Unknown

 

          COMPLETE BLOOD COUNT 5344887   WBC                 7.2 10e9/L 20

15 Unknown

 

          COMPLETE BLOOD COUNT 8759199   RBC                 4.28 10e12/L 2015 Unknown

 

          COMPLETE BLOOD COUNT 1232410   HGB                 12.8 g/dL 

5 Unknown

 

          COMPLETE BLOOD COUNT 7237644   HCT DET             39.3 %    

5 Unknown

 

          COMPLETE BLOOD COUNT 2245480   MCV                 91.8 fL   

5 Unknown

 

          COMPLETE BLOOD COUNT 8619839   MCH                 29.9 pg   

5 Unknown

 

          COMPLETE BLOOD COUNT 6501073   MCHC                32.6 g/dL 

5 Unknown

 

          COMPLETE BLOOD COUNT 3078962   PLT                 189 10e9/L 20

15 Unknown

 

          COMPLETE BLOOD COUNT [id number]   MPV                 11.2 fL   

5 Unknown

 

          COMPLETE BLOOD COUNT 3585638   ARIK %               38.0 %    

5 Unknown

 

          COMPLETE BLOOD COUNT 6858985   LY %                51.0 %    

5 Unknown

 

          COMPLETE BLOOD COUNT 1488552   MON %               7.7 %     

5 Unknown

 

          COMPLETE BLOOD COUNT 2549709   EOS  %              2.9 %     

5 Unknown

 

          COMPLETE BLOOD COUNT 0399982   BASO %              0.4 %     

5 Unknown

 

          COMPLETE BLOOD COUNT 5764292   RDW                 14.0 %    

5 Unknown

 

          COMPLETE BLOOD COUNT 2053014   ABS ARIK             2.74 10e9/L 

015 Unknown

 

          COMPLETE BLOOD COUNT 7110896   ABS LYMPH           3.67 10e9/L 

015 Unknown

 

          COMPLETE BLOOD COUNT 9121262   ABS MONO            0.55 10e9/L 

015 Unknown

 

          COMPLETE BLOOD COUNT 8331390   ABS EOS             0.21 10e9/L 

015 Unknown

 

          COMPLETE BLOOD COUNT 6583591   ABS BASO            0.03 10e9/L 

015 Unknown

 

          COMPLETE BLOOD COUNT 7355360   RDW-SD              46.1 fL   

5 Unknown

 

          FREE T4   62753     FREE T4             1.14 NG/DL 2015 Unknown

 

          GLYCOSYLATED HEMOGLOBIN TEST 20629     A1C HPLC  07306-8   5.2 %     0

2014 Unknown

 

          FREE T4   90964     FREE T4             1.40 NG/DL 2014 Unknown

 

          GFR CALC  4177284   GFR AA              >60 ML/MIN 2014 Unknown

 

          GFR CALC  5609642   GFR NON-AA           52.0L ML/MIN 2014 Unkno

wn

 

          COMPREHENSIVE METABOLIC 86592     AST                 15 U/L    2014 Unknown

 

          COMPREHENSIVE METABOLIC 41918     ALT                 9 IU/L    2014 Unknown

 

          COMPREHENSIVE METABOLIC 34889     BUN                 17 MG/DL  2014 Unknown

 

          COMPREHENSIVE METABOLIC 87088     ALBUMIN             4.0 GM/DL 2014 Unknown

 

          COMPREHENSIVE METABOLIC 33540     CHLORIDE            112 MMOL/L  Unknown

 

          COMPREHENSIVE METABOLIC 47128     BILI TOT            0.5 MG/DL 2014 Unknown

 

          COMPREHENSIVE METABOLIC 72356     ALK PHOS            66 U/L    2014 Unknown

 

          COMPREHENSIVE METABOLIC 62510     SODIUM              140 MMOL/L  Unknown

 

          COMPREHENSIVE METABOLIC 91908     CREATININE           1.03 MG/DL  Unknown

 

          COMPREHENSIVE METABOLIC 06758     CALCIUM             9.5 MG/DL 2014 Unknown

 

          COMPREHENSIVE METABOLIC 56136     POTASSIUM           4.1 MMOL/L  Unknown

 

          COMPREHENSIVE METABOLIC 07394     PROT TOT            6.2 GM/DL 2014 Unknown

 

          COMPREHENSIVE METABOLIC 11113     Glucose             102 MG/DL 2014 Unknown

 

          COMPREHENSIVE METABOLIC 66201     BICARB              23 MMOL/L 2014 Unknown

 

          COMPREHENSIVE METABOLIC 06447     ANION GAP           5 MEQ/L   2014 Unknown

 

          THYROID STIMULATING HORMONE 11968     TSH                 2.074 uIU/ML

 2014 Unknown

 

          VITAMIN B 12 FOLIC ACID 56769|16674 VIT B 12            423 PG/ML  Unknown

 

          VITAMIN B 12 FOLIC ACID 47451|05842 FOLIC ACID           19.7 NG/ML  Unknown

 

          LIPID GROUP [id number]     HDL TEST            40 MG/DL  2014 Unknown

 

          LIPID GROUP [id number]     TRIG                145 MG/DL 2014 Unknown

 

          LIPID GROUP [id number]     TEST LDL            81 MG/DL  2014 Unknown

 

          LIPID GROUP [id number]     CHOL                150 MG/DL 2014 Unknown

 

          LIPID GROUP [id number]     RCHOL/HDL           3.75 RATIO 2014 Unknow

n

 

          COMPLETE BLOOD COUNT [id number]   WBC                 6.0 10e9/L 20

14 Unknown

 

          COMPLETE BLOOD COUNT 0472176   RBC                 4.26 10e12/L 2014 Unknown

 

          COMPLETE BLOOD COUNT [id number]   HGB                 12.7 g/dL 

4 Unknown

 

          COMPLETE BLOOD COUNT [id number]   HCT DET             38.7 %    

4 Unknown

 

          COMPLETE BLOOD COUNT [id number]   MCV                 90.8 fL   

4 Unknown

 

          COMPLETE BLOOD COUNT 0120225   MCH                 29.8 pg   

4 Unknown

 

          COMPLETE BLOOD COUNT [id number]   MCHC                32.8 g/dL 

4 Unknown

 

          COMPLETE BLOOD COUNT [id number]   PLT                 178 10e9/L 20

14 Unknown

 

          COMPLETE BLOOD COUNT [id number]   MPV                 11.7 fL   

4 Unknown

 

          COMPLETE BLOOD COUNT 7390618   ARIK %               30.5 %    

4 Unknown

 

          COMPLETE BLOOD COUNT 1196697   LY %                55.4 %    

4 Unknown

 

          COMPLETE BLOOD COUNT [id number]   MON %               9.0 %     

4 Unknown

 

          COMPLETE BLOOD COUNT 1113265   EOS  %              4.4 %     

4 Unknown

 

          COMPLETE BLOOD COUNT 7374125   BASO %              0.7 %     

4 Unknown

 

          COMPLETE BLOOD COUNT 9554621   RDW                 13.3 %    

4 Unknown

 

          COMPLETE BLOOD COUNT [id number]   ABS ARIK             1.83 10e9/L 

014 Unknown

 

          COMPLETE BLOOD COUNT [id number]   ABS LYMPH           3.32 10e9/L 

014 Unknown

 

          COMPLETE BLOOD COUNT 8602131   ABS MONO            0.54 10e9/L 

014 Unknown

 

          COMPLETE BLOOD COUNT 5808164   ABS EOS             0.26 10e9/L 

014 Unknown

 

          COMPLETE BLOOD COUNT [id number]   ABS BASO            0.04 10e9/L 

014 Unknown

 

          COMPLETE BLOOD COUNT 6114684   RDW-SD              43.2 fL   

4 Unknown

 

          HEMOGLOBIN A1C (GLYCOSYLATED) 6822576   A1C HPLC  98750-1   5.5 %     

2012 Unknown

 

          COMPLETE BLOOD COUNT [id number]   WBC                 6.0 10e9/L 20

12 Unknown

 

          COMPLETE BLOOD COUNT 7412016   RBC                 4.22 10e12/L 2012 Unknown

 

          COMPLETE BLOOD COUNT 9985068   HGB                 12.4 g/dL 

2 Unknown

 

          COMPLETE BLOOD COUNT 0774448   HCT DET             38.2 %    

2 Unknown

 

          COMPLETE BLOOD COUNT 9339677   MCV                 90.5 fL   

2 Unknown

 

          COMPLETE BLOOD COUNT 6799664   MCH                 29.4 pg   

2 Unknown

 

          COMPLETE BLOOD COUNT 6653839   MCHC                32.5 g/dL 

2 Unknown

 

          COMPLETE BLOOD COUNT 7781523   PLT                 187 10e9/L 20

12 Unknown

 

          COMPLETE BLOOD COUNT 2959574   MPV                 11.5 fL   

2 Unknown

 

          COMPLETE BLOOD COUNT 9347070   ARIK %               36.4 %    

2 Unknown

 

          COMPLETE BLOOD COUNT 3472098   LY %                51.0 %    

2 Unknown

 

          COMPLETE BLOOD COUNT 0592696   MON %               8.7 %     

2 Unknown

 

          COMPLETE BLOOD COUNT 6042058   EOS  %              3.2 %     

2 Unknown

 

          COMPLETE BLOOD COUNT 8136502   BASO %              0.7 %     

2 Unknown

 

          COMPLETE BLOOD COUNT 3004459   RDW                 13.7 %    

2 Unknown

 

          COMPLETE BLOOD COUNT 7755602   ABS ARIK             2.18 10e9/L 

012 Unknown

 

          COMPLETE BLOOD COUNT [id number]   ABS LYMPH           3.06 10e9/L 

012 Unknown

 

          COMPLETE BLOOD COUNT 2340313   ABS MONO            0.52 10e9/L 

012 Unknown

 

          COMPLETE BLOOD COUNT 5843137   ABS EOS             0.19 10e9/L 

012 Unknown

 

          COMPLETE BLOOD COUNT [id number]   ABS BASO            0.04 10e9/L 

012 Unknown

 

          COMPLETE BLOOD COUNT 8463032   RDW-SD              44.3 fL   

2 Unknown

 

          LIPID GROUP [id number]     HDL TEST            42 MG/DL  2012 Unknown

 

          LIPID GROUP [id number]     TRIG                156 MG/DL 2012 Unknown

 

          LIPID GROUP [id number]     TEST LDL            80 MG/DL  2012 Unknown

 

          LIPID GROUP [id number]     CHOL                153 MG/DL 2012 Unknown

 

          LIPID GROUP [id number]     RCHOL/HDL           3.64 RATIO 2012 Unknow

n

 

          FREE T4   40816     FREE T4             1.22 NG/DL 2012 Unknown

 

          COMPREHENSIVE METABOLIC 03697     AST                 20 U/L    2012 Unknown

 

          COMPREHENSIVE METABOLIC 53950     ALT                 11 IU/L   2012 Unknown

 

          COMPREHENSIVE METABOLIC 19719     BUN                 19 MG/DL  2012 Unknown

 

          COMPREHENSIVE METABOLIC 83329     ALBUMIN             4.3 GM/DL 2012 Unknown

 

          COMPREHENSIVE METABOLIC 82634     CHLORIDE            109 MMOL/L  Unknown

 

          COMPREHENSIVE METABOLIC 59695     BILI TOT            0.6 MG/DL 2012 Unknown

 

          COMPREHENSIVE METABOLIC 63141     ALK PHOS            84 U/L    2012 Unknown

 

          COMPREHENSIVE METABOLIC 89220     SODIUM              142 MMOL/L  Unknown

 

          COMPREHENSIVE METABOLIC 19250     CREATININE           1.09 MG/DL  Unknown

 

          COMPREHENSIVE METABOLIC 31056     CALCIUM             9.8 MG/DL 2012 Unknown

 

          COMPREHENSIVE METABOLIC 72663     POTASSIUM           4.2 MMOL/L  Unknown

 

          COMPREHENSIVE METABOLIC 54262     PROT TOT            6.4 GM/DL 2012 Unknown

 

          COMPREHENSIVE METABOLIC 12571     Glucose             89 MG/DL  2012 Unknown

 

          COMPREHENSIVE METABOLIC 28957     BICARB              25 MMOL/L 2012 Unknown

 

          COMPREHENSIVE METABOLIC 82002     ANION GAP           8 MEQ/L   2012 Unknown

 

          GFR CALC  7010584   GFR AA              60.0L ML/MIN 2012 Unknow

n

 

          GFR CALC  3180609   GFR NON-AA           49.0L ML/MIN 2012 Unkno

wn

 

          THYROID STIMULATING HORMONE 55710     TSH                 2.450 uIU/ML

 2012 Unknown

 

          COMPREHENSIVE METABOLIC 58883     AST                 22 U/L    2012 Unknown

 

          COMPREHENSIVE METABOLIC 67444     ALT                 14 IU/L   2012 Unknown

 

          COMPREHENSIVE METABOLIC 98970     BUN                 21 MG/DL  2012 Unknown

 

          COMPREHENSIVE METABOLIC 59255     ALBUMIN             4.3 GM/DL 2012 Unknown

 

          COMPREHENSIVE METABOLIC 76451     CHLORIDE            106 MMOL/L  Unknown

 

          COMPREHENSIVE METABOLIC 84452     BILI TOT            0.4 MG/DL 2012 Unknown

 

          COMPREHENSIVE METABOLIC 50568     ALK PHOS            80 U/L    2012 Unknown

 

          COMPREHENSIVE METABOLIC 25162     SODIUM              141 MMOL/L  Unknown

 

          COMPREHENSIVE METABOLIC 87532     CREATININE           1.13 MG/DL  Unknown

 

          COMPREHENSIVE METABOLIC 85627     CALCIUM             9.4 MG/DL 2012 Unknown

 

          COMPREHENSIVE METABOLIC 50819     POTASSIUM           4.3 MMOL/L  Unknown

 

          COMPREHENSIVE METABOLIC 07213     PROT TOT            6.7 GM/DL 2012 Unknown

 

          COMPREHENSIVE METABOLIC 37329     Glucose             98 MG/DL  2012 Unknown

 

          COMPREHENSIVE METABOLIC 63669     BICARB              25 MMOL/L 2012 Unknown

 

          COMPREHENSIVE METABOLIC 12973     ANION GAP           10 MEQ/L  2012 Unknown

 

          LIPID GROUP [id number]     HDL TEST            44 MG/DL  2012 Unknown

 

          LIPID GROUP [id number]     TRIG                164 MG/DL 2012 Unknown

 

          LIPID GROUP [id number]     TEST LDL            98 MG/DL  2012 Unknown

 

          LIPID GROUP 96250     CHOL                175 MG/DL 2012 Unknown

 

          LIPID GROUP [id number]     RCHOL/HDL           3.98 RATIO 2012 Unknow

n

 

          COMPLETE BLOOD COUNT 06234     WBC                 6.7 10e9/L 20

12 Unknown

 

          COMPLETE BLOOD COUNT 53508     RBC                 4.36 10e12/L 2012 Unknown

 

          COMPLETE BLOOD COUNT 06742     HGB                 12.9 g/dL 

2 Unknown

 

          COMPLETE BLOOD COUNT 55577     HCT DET             39.4 %    

2 Unknown

 

          COMPLETE BLOOD COUNT 01529     MCV                 90.4 fL   

2 Unknown

 

          COMPLETE BLOOD COUNT 46747     MCH                 29.6 pg   

2 Unknown

 

          COMPLETE BLOOD COUNT 03743     MCHC                32.7 g/dL 

2 Unknown

 

          COMPLETE BLOOD COUNT 85453     PLT                 184 10e9/L 20

12 Unknown

 

          COMPLETE BLOOD COUNT 59456     MPV                 10.9 fL   

2 Unknown

 

          COMPLETE BLOOD COUNT 04897     ARIK %               41.5 %    

2 Unknown

 

          COMPLETE BLOOD COUNT 52807     LY %                45.7 %    

2 Unknown

 

          COMPLETE BLOOD COUNT 35277     MON %               9.4 %     

2 Unknown

 

          COMPLETE BLOOD COUNT 08015     EOS  %              3.0 %     

2 Unknown

 

          COMPLETE BLOOD COUNT 76851     BASO %              0.4 %     

2 Unknown

 

          COMPLETE BLOOD COUNT 22581     RDW                 13.2 %    

2 Unknown

 

          COMPLETE BLOOD COUNT 25876     ABS ARIK             2.78 10e9/L 

012 Unknown

 

          COMPLETE BLOOD COUNT 41323     ABS LYMPH           3.06 10e9/L 

012 Unknown

 

          COMPLETE BLOOD COUNT 18446     ABS MONO            0.63 10e9/L 

012 Unknown

 

          COMPLETE BLOOD COUNT 82196     ABS EOS             0.20 10e9/L 

012 Unknown

 

          COMPLETE BLOOD COUNT 28036     ABS BASO            0.03 10e9/L 

012 Unknown

 

          COMPLETE BLOOD COUNT 03076     RDW-SD              42.3 fL   

2 Unknown

 

          GFR CALC  6314668   GFR AA              57.0L ML/MIN 2012 Unknow

n

 

          GFR CALC  0179937   GFR NON-AA           47.0L ML/MIN 2012 Unkno

wn

 

          THYROID STIMULATING HORMONE 12800     TSH                 2.663 uIU/ML

 2012 Unknown

 

          FREE T4   70514     FREE T4             1.15 NG/DL 2012 Unknown

 

          THYROID STIMULATING HORMONE 86003     TSH                 1.908 uIU/ML

 2011 Unknown

 

          COMPLETE BLOOD COUNT 95604     WBC                 6.4 10e9/L 20

11 Unknown

 

          COMPLETE BLOOD COUNT 10859     RBC                 3.92 10e12/L 2011 Unknown

 

          COMPLETE BLOOD COUNT 92635     HGB                 11.8 g/dL 

1 Unknown

 

          COMPLETE BLOOD COUNT 18407     HCT DET             36.0 %    

1 Unknown

 

          COMPLETE BLOOD COUNT 64572     MCV                 91.8 fL   

1 Unknown

 

          COMPLETE BLOOD COUNT 44847     MCH                 30.1 pg   

1 Unknown

 

          COMPLETE BLOOD COUNT 94970     MCHC                32.8 g/dL 

1 Unknown

 

          COMPLETE BLOOD COUNT 75100     PLT                 176 10e9/L 20

11 Unknown

 

          COMPLETE BLOOD COUNT 64113     MPV                 11.4 fL   

1 Unknown

 

          COMPLETE BLOOD COUNT 32035     ARIK %               50.4 %    

1 Unknown

 

          COMPLETE BLOOD COUNT 99667     LY %                35.5 %    

1 Unknown

 

          COMPLETE BLOOD COUNT 89257     MON %               10.2 %    

1 Unknown

 

          COMPLETE BLOOD COUNT 23047     EOS  %              3.3 %     

1 Unknown

 

          COMPLETE BLOOD COUNT 88667     BASO %              0.6 %     

1 Unknown

 

          COMPLETE BLOOD COUNT 98375     RDW                 13.7 %    

1 Unknown

 

          COMPLETE BLOOD COUNT 37124     ABS ARIK             3.23 10e9/L 

011 Unknown

 

          COMPLETE BLOOD COUNT 93137     ABS LYMPH           2.27 10e9/L 

011 Unknown

 

          COMPLETE BLOOD COUNT 27472     ABS MONO            0.65 10e9/L 

011 Unknown

 

          COMPLETE BLOOD COUNT 97312     ABS EOS             0.21 10e9/L 

011 Unknown

 

          COMPLETE BLOOD COUNT 50261     ABS BASO            0.04 10e9/L 

011 Unknown

 

          COMPLETE BLOOD COUNT 72814     RDW-SD              45.3 fL   

1 Unknown

 

          GFR CALC  4854286   GFR AA              >60 ML/MIN 2011 Unknown

 

          GFR CALC  9022186   GFR NON-AA           53.0L ML/MIN 2011 Unkno

wn

 

          FREE T4   16025     FREE T4             1.20 NG/DL 2011 Unknown

 

          COMPREHENSIVE METABOLIC 02957     AST                 17 U/L    2011 Unknown

 

          COMPREHENSIVE METABOLIC 72149     ALT                 9 IU/L    2011 Unknown

 

          COMPREHENSIVE METABOLIC 71020     BUN                 16 MG/DL  2011 Unknown

 

          COMPREHENSIVE METABOLIC 67792     ALBUMIN             4.0 GM/DL 2011 Unknown

 

          COMPREHENSIVE METABOLIC 33591     CHLORIDE            108 MMOL/L  Unknown

 

          COMPREHENSIVE METABOLIC 85565     BILI TOT            0.5 MG/DL 2011 Unknown

 

          COMPREHENSIVE METABOLIC 24079     ALK PHOS            76 U/L    2011 Unknown

 

          COMPREHENSIVE METABOLIC 15480     SODIUM              139 MMOL/L  Unknown

 

          COMPREHENSIVE METABOLIC 44354     CREATININE           1.02 MG/DL 2011 Unknown

 

          COMPREHENSIVE METABOLIC 85415     CALCIUM             9.2 MG/DL 2011 Unknown

 

          COMPREHENSIVE METABOLIC 04645     POTASSIUM           4.5 MMOL/L  Unknown

 

          COMPREHENSIVE METABOLIC 39708     PROT TOT            6.1 GM/DL 2011 Unknown

 

          COMPREHENSIVE METABOLIC 97438     Glucose             93 MG/DL  2011 Unknown

 

          COMPREHENSIVE METABOLIC 06414     BICARB              26 MMOL/L 2011 Unknown

 

          COMPREHENSIVE METABOLIC 01314     ANION GAP           5 MEQ/L   2011 Unknown

 

          LIPID GROUP 01824     HDL TEST            46 MG/DL  2011 Unknown

 

          LIPID GROUP 24549     TRIG                102 MG/DL 2011 Unknown

 

          LIPID GROUP 57136     TEST LDL            88 MG/DL  2011 Unknown

 

          LIPID GROUP 50372     CHOL                154 MG/DL 2011 Unknown

 

          LIPID GROUP 35101     RCHOL/HDL           3.35 RATIO 2011 Unknow

n







Procedures





                    Procedure           Codes               Date

 

                    ROUTINE VENIPUNCTURE CPT-4: 25231        2020

 

                    ASSAY THYROID STIM HORMONE CPT-4: 85448        2020

 

                    COMPLETE CBC W/AUTO DIFF WBC CPT-4: 72175        2020

 

                    COMPREHEN METABOLIC PANEL CPT-4: 61545        2020

 

                    ROUTINE VENIPUNCTURE CPT-4: 65269        2019

 

                    LIPID PANEL         CPT-4: 41887        2019

 

                    FLU VACC PRSV FREE INC ANTIG 65 AND OLDER CPT-4: 44479      

  10/22/2019

 

                    FLU VACC PRSV FREE INC ANTIG 65 AND OLDER CPT-4: 44349      

  10/22/2019

 

                    ADMIN INFLUENZA VIRUS VAC CPT-4:         10/22/2019

 

                    COMPREHEN METABOLIC PANEL CPT-4: 68632        10/11/2019

 

                    ROUTINE VENIPUNCTURE CPT-4: 15512        10/11/2019

 

                    ROUTINE VENIPUNCTURE CPT-4: 34584        06/10/2019

 

                    ASSAY THYROID STIM HORMONE CPT-4: 44740        06/10/2019

 

                    COMPREHEN METABOLIC PANEL CPT-4: 09548        06/10/2019

 

                    COMPLETE CBC W/AUTO DIFF WBC CPT-4: 46678        06/10/2019

 

                    URINALYSIS NONAUTO W/O SCOPE CPT-4: 21322        2019

 

                    URINE CULTURE/ COLONY COUNT CPT-4: 63088        2019

 

                    URINE CULTURE/ COLONY COUNT CPT-4: 80536        10/02/2018

 

                    ROUTINE VENIPUNCTURE CPT-4: 43771        2018

 

                    ASSAY OF FREE THYROXINE CPT-4: 99043        2018

 

                    ASSAY THYROID STIM HORMONE CPT-4: 86997        2018

 

                    COMPLETE CBC W/AUTO DIFF WBC CPT-4: 35692        2018

 

                    METABOLIC PANEL TOTAL CA CPT-4: 02411        2018

 

                    FLU VACC PRSV FREE INC ANTIG 65 AND OLDER CPT-4: 10777      

  2018

 

                    ASSAY, GLUCOSE, BLOOD QUANT CPT-4: 37574        2018

 

                    ADMIN INFLUENZA VIRUS VAC CPT-4:         2018

 

                    ROUTINE VENIPUNCTURE CPT-4: 51411        2018

 

                    COMPREHEN METABOLIC PANEL CPT-4: 29401        2018

 

                    COMPLETE CBC W/AUTO DIFF WBC CPT-4: 78636        2018

 

                    A1C HPLC            CPT-4: 35905        2018

 

                    ASSAY OF TROPONIN QUANT CPT-4: 16300        2018

 

                    LIPID PANEL         CPT-4: 33917        2018

 

                    THER/PROPH/DIAG INJ SC/IM CPT-4: 49740        2018

 

                    TRIAMCINOLONE ACET INJ NOS CPT-4:         2018

 

                    URINALYSIS NONAUTO W/O SCOPE CPT-4: 99778        2018

 

                    URINE CULTURE/ COLONY COUNT CPT-4: 39783        2018

 

                    FLU VACC PRSV FREE INC ANTIG 65 AND OLDER CPT-4: 06630      

  10/06/2017

 

                    ADMIN INFLUENZA VIRUS VAC CPT-4:         10/06/2017

 

                    ROUTINE VENIPUNCTURE CPT-4: 76032        2017

 

                    COMPREHEN METABOLIC PANEL CPT-4: 00831        2017

 

                    COMPLETE CBC W/AUTO DIFF WBC CPT-4: 54373        2017

 

                    LIPID PANEL         CPT-4: 92327        2017

 

                    A1C HPLC            CPT-4: 25378        2017

 

                    ASSAY THYROID STIM HORMONE CPT-4: 60906        2017

 

                    ROUTINE VENIPUNCTURE CPT-4: 33329        2017

 

                    ASSAY THYROID STIM HORMONE CPT-4: 72359        2017

 

                    COMPREHEN METABOLIC PANEL CPT-4: 06916        2017

 

                    COMPLETE CBC W/AUTO DIFF WBC CPT-4: 74302        2017

 

                    A1C HPLC            CPT-4: 92558        2017

 

                    FLU VACC PRSV FREE INC ANTIG 65 AND OLDER CPT-4: 57155      

  10/07/2016

 

                    ADMIN INFLUENZA VIRUS VAC CPT-4:         10/07/2016

 

                    ROUTINE VENIPUNCTURE CPT-4: 83155        2016

 

                    ASSAY OF FREE THYROXINE CPT-4: 59548        2016

 

                    ASSAY THYROID STIM HORMONE CPT-4: 69880        2016

 

                    COMPREHEN METABOLIC PANEL CPT-4: 70735        2016

 

                    COMPLETE CBC W/AUTO DIFF WBC CPT-4: 54556        2016

 

                    LIPID PANEL         CPT-4:         2016

 

                    A1C HPLC            CPT-4: 37174        2016

 

                    URINALYSIS NONAUTO W/O SCOPE CPT-4: 17865        2016

 

                    ROUTINE VENIPUNCTURE CPT-4: 47456        2015

 

                    METABOLIC PANEL TOTAL CA CPT-4: 23034        2015

 

                    PRESCRIP TRANSMIT VIA ERX SY CPT-4:         2015

 

                    FLU VACC PRSV FREE INC ANTIG 65 AND OLDER CPT-4: 28902      

  10/16/2015

 

                    ADMIN INFLUENZA VIRUS VAC CPT-4:         10/16/2015

 

                    URINALYSIS NONAUTO W/O SCOPE CPT-4: 05904        09/15/2015

 

                    URINE CULTURE/ COLONY COUNT CPT-4: 66662        09/15/2015

 

                    ROUTINE VENIPUNCTURE CPT-4: 94503        09/10/2015

 

                    ASSAY OF FREE THYROXINE CPT-4: 53947        09/10/2015

 

                    ASSAY THYROID STIM HORMONE CPT-4: 52527        09/10/2015

 

                    COMPREHEN METABOLIC PANEL CPT-4: 45090        09/10/2015

 

                    COMPLETE CBC W/AUTO DIFF WBC CPT-4: 77501        09/10/2015

 

                    LIPID PANEL         CPT-4: 81954        09/10/2015

 

                    A1C HPLC            CPT-4: 62582        09/10/2015

 

                    CERUM REMOVAL       CPT-4: 06852        2015

 

                    PRESCRIP TRANSMIT VIA ERX SY CPT-4:         2015

 

                    FLUZONE, 5ML (Medicare) CPT-4:         10/17/2014

 

                    ADMIN INFLUENZA VIRUS VAC CPT-4:         10/17/2014

 

                    PRESCRIP TRANSMIT VIA ERX SY CPT-4:         2014

 

                    PRESCRIP TRANSMIT VIA ERX SY CPT-4:         2014

 

                    PRESCRIP TRANSMIT VIA ERX SY CPT-4:         2014

 

                    ROUTINE VENIPUNCTURE CPT-4: 60073        2014

 

                    ASSAY OF FREE THYROXINE CPT-4: 83305        2014

 

                    ASSAY THYROID STIM HORMONE CPT-4: 11490        2014

 

                    COMPREHEN METABOLIC PANEL CPT-4: 12851        2014

 

                    COMPLETE CBC W/AUTO DIFF WBC CPT-4: 84907        2014

 

                    LIPID PANEL         CPT-4: 72242        2014

 

                    A1C HPLC            CPT-4: 14864        2014

 

                    VITAMIN B 12 FOLIC ACID CPT-4: 44398|15606  2014

 

                    PRESCRIP TRANSMIT VIA ERX SY CPT-4:         2014

 

                    PRESCRIP TRANSMIT VIA ERX SY CPT-4:         10/15/2013

 

                    FLUZONE, 5ML (Medicare) CPT-4:         2013

 

                    ADMIN INFLUENZA VIRUS VAC CPT-4:         2013

 

                    PRESCRIP TRANSMIT VIA ERX SY CPT-4:         2013

 

                    PRESCRIP TRANSMIT VIA ERX SY CPT-4:         05/10/2013

 

                    ROUTINE VENIPUNCTURE CPT-4: 94646        2012

 

                    ASSAY OF FREE THYROXINE CPT-4: 59170        2012

 

                    ASSAY THYROID STIM HORMONE CPT-4: 83637        2012

 

                    COMPREHEN METABOLIC PANEL CPT-4: 14784        2012

 

                    COMPLETE CBC W/AUTO DIFF WBC CPT-4: 46078        2012

 

                    LIPID PANEL         CPT-4: 49868        2012

 

                    A1C GLYCOSYLATED HEMOGLOBIN TEST CPT-4: 21845        

012

 

                    CERUM REMOVAL       CPT-4: 77750        2012

 

                    PRESCRIP TRANSMIT VIA ERX SY CPT-4:         2012

 

                    PRESCRIP TRANSMIT VIA ERX SY CPT-4:         10/10/2012

 

                    FLUZONE, 5ML (Medicare) CPT-4:         2012

 

                    ADMIN INFLUENZA VIRUS VAC CPT-4:         2012

 

                    ASSAY, GLUCOSE, BLOOD QUANT CPT-4: 70832        2012

 

                    URINALYSIS NONAUTO W/O SCOPE CPT-4: 22698        2012

 

                    URINE CULTURE/ COLONY COUNT CPT-4: 23075        2012

 

                    ROUTINE VENIPUNCTURE CPT-4: 54431        2012

 

                    ASSAY OF FREE THYROXINE CPT-4: 78189        2012

 

                    ASSAY THYROID STIM HORMONE CPT-4: 17926        2012

 

                    COMPREHEN METABOLIC PANEL CPT-4: 90998        2012

 

                    COMPLETE CBC W/AUTO DIFF WBC CPT-4: 33520        2012

 

                    LIPID PANEL         CPT-4: 23876        2012

 

                    ASSAY OF INSULIN    CPT-4: 20884        2012

 

                    A1C GLYCOSYLATED HEMOGLOBIN TEST CPT-4: 32179        

012

 

                    DRAIN/INJECT JOINT/BURSA CPT-4: 08105        2012

 

                    METHYLPREDNISOLONE 40 MG INJ CPT-4:         2012

 

                    TRIAMCINOLONE ACET INJ NOS CPT-4:         2012

 

                    PRESCRIP TRANSMIT VIA ERX SY CPT-4:         2012

 

                    PRESCRIP TRANSMIT VIA ERX SY CPT-4:         2012

 

                    METHYLPREDNISOLONE 40 MG INJ CPT-4:         2012

 

                    DRAIN/INJECT JOINT/BURSA CPT-4: 73482        2012

 

                    TRIAMCINOLONE ACET INJ NOS CPT-4:         2012

 

                    PRESCRIP TRANSMIT VIA ERX SY CPT-4:         2012

 

                    ROUTINE VENIPUNCTURE CPT-4: 88549        2012

 

                    ASSAY OF FREE THYROXINE CPT-4: 54693        2012

 

                    ASSAY THYROID STIM HORMONE CPT-4: 17443        2012

 

                    COMPREHEN METABOLIC PANEL CPT-4: 28247        2012

 

                    COMPLETE CBC W/AUTO DIFF WBC CPT-4: 75431        2012

 

                    LIPID PANEL         CPT-4: 65221        2012

 

                    PRESCRIP TRANSMIT VIA ERX SY CPT-4:         2012

 

                    CERUM REMOVAL       CPT-4: 75059        2011

 

                    PRESCRIP TRANSMIT VIA ERX SY CPT-4:         2011

 

                    FLUZONE, 5ML (Medicare) CPT-4:         10/05/2011

 

                    ADMIN INFLUENZA VIRUS VAC CPT-4:         10/05/2011

 

                    PRESCRIP TRANSMIT VIA ERX SY CPT-4:         2011

 

                    URINALYSIS NONAUTO W/O SCOPE CPT-4: 15249        2011

 

                    URINE CULTURE/ COLONY COUNT CPT-4: 73690        2011

 

                    CUR TOBACCO NON-USER CPT-4:         2011

 

                    ROUTINE VENIPUNCTURE CPT-4: 71283        2011

 

                    COMPLETE CBC W/AUTO DIFF WBC CPT-4: 85724        2011

 

                    COMPREHEN METABOLIC PANEL CPT-4: 82050        2011

 

                    LIPID PANEL         CPT-4: 32680        2011

 

                    ASSAY THYROID STIM HORMONE CPT-4: 89293        2011

 

                    ASSAY OF FREE THYROXINE CPT-4: 60655        2011

 

                    PRESCRIP TRANSMIT VIA ERX SY CPT-4:         2011

 

                    INJ TRIGGER POINT 1/2 MUSCL CPT-4: 20732        2011

 

                    TRIAMCINOLONE ACET INJ NOS CPT-4:         2011

 

                    METHYLPREDNISOLONE 40 MG INJ CPT-4:         2011

 

                    THER/PROPH/DIAG INJ SC/IM CPT-4: 62077        2011

 

                    KETOROLAC TROMETHAMINE INJ CPT-4:         2011

 

                    PRESCRIP TRANSMIT VIA ERX SY CPT-4:         2010

 

                    FLU VACCINE 3 YRS & > IM UP 64 CPT-4: 97859        10/06/201

0

 

                    ADMIN INFLUENZA VIRUS VAC CPT-4:         10/06/2010

 

                    URINALYSIS NONAUTO W/O SCOPE CPT-4: 22569        2010

 

                    URINE CULTURE/ COLONY COUNT CPT-4: 91225        2010

 

                    PRESCRIP TRANSMIT VIA ERX SY CPT-4:         2010

 

                    THER/PROPH/DIAG INJ SC/IM CPT-4: 17824        2010

 

                    VITAMIN B12 INJECTION CPT-4:         2010

 

                    THER/PROPH/DIAG INJ SC/IM CPT-4: 47845        2010

 

                    VITAMIN B12 INJECTION CPT-4:         2010

 

                    ROUTINE VENIPUNCTURE CPT-4: 85327        2010







Vital Signs





                          Date                      Vital

 

                2020      Blood Pressure 1: 138/64 Code: 8480-6 Heart Rate

 1: 52 bpm 

Respiratory Rate: 18 bpm  SpO2: 98%                 Temperature: 36.3 (C) / 97.4

 (F)

 

                    2020          Blood Pressure 1: 144/82 Code: 8480-6 He

art Rate 1: 56 bpm

 

                2020      Blood Pressure 1: 154/86 Code: 8480-6 Heart Rate

 1: 64 bpm 

Respiratory Rate: 20 bpm SpO2: 99%           Temperature: 37.1 (C) / 98.7 (F) We

ight: 215 

lbs 

 

             2019   Blood Pressure 1: 140/70 Code: 8480-6 Heart Rate 1: 57

 bpm SpO2: 99%    

Temperature: 35.9 (C) / 96.6 (F)        Weight: 215 lbs 

 

                06/10/2019      Blood Pressure 1: 144/70 Code: 8480-6 Heart Rate

 1: 60 bpm 

Respiratory Rate: 20 bpm SpO2: 95%           Temperature: 36.4 (C) / 97.6 (F) We

ight: 214 

lbs 

 

                2019      Blood Pressure 1: 128/78 Code: 8480-6 Heart Rate

 1: 56 bpm 

Respiratory Rate: 20 bpm SpO2: 98%           Temperature: 36.3 (C) / 97.4 (F) We

ight: 213 

lbs 

 

                2018      Blood Pressure 1: 142/60 Code: 8480-6 BMI: 38.2 

Code: 53053-3 Heart 

Rate 1: 48 bpm  Height: 5'2"    Respiratory Rate: 20 bpm SpO2: 98%       Tempera

ture: 36.7

(C) / 98.1 (F)                          Weight: 212 lbs 

 

                2018      Blood Pressure 1: 142/64 Code: 8480-6 BMI: 38.5 

Code: 58443-7 Heart 

Rate 1: 52 bpm  Height: 5'2"    Respiratory Rate: 22 bpm SpO2: 96%       Tempera

ture: 36.1

(C) / 96.9 (F)                          Weight: 214 lbs 

 

                10/02/2018      Blood Pressure 1: 124/80 Code: 8480-6 BMI: 38.3 

Code: 99592-7 Heart 

Rate 1: 68 bpm      Height: 5'2"        Respiratory Rate: 20 bpm Temperature: 36

.3 (C) / 

97.4 (F)                                Weight: 213 lbs 

 

                2018      Blood Pressure 1: 132/78 Code: 8480-6 BMI: 37.6 

Code: 73123-2 Heart 

Rate 1: 68 bpm  Height: 5'2"    Respiratory Rate: 20 bpm SpO2: 97%       Tempera

ture: 36.8

(C) / 98.2 (F)                          Weight: 209 lbs 

 

                2018      Blood Pressure 1: 150/76 Code: 8480-6 BMI: 38.5 

Code: 97579-6 Heart 

Rate 1: 64 bpm  Height: 5'2"    Respiratory Rate: 20 bpm SpO2: 97%       Tempera

ture: 36.2

(C) / 97.2 (F)                          Weight: 214 lbs 

 

                2018      Blood Pressure 1: 122/74 Code: 8480-6 BMI: 38.2 

Code: 78221-6 Heart 

Rate 1: 64 bpm  Height: 5'2"    Respiratory Rate: 18 bpm SpO2: 96%       Tempera

ture: 35.8

(C) / 96.4 (F)                          Weight: 212 lbs 

 

                2018      Blood Pressure 1: 124/78 Code: 8480-6 BMI: 37.8 

Code: 71666-1 Heart 

Rate 1: 76 bpm      Height: 5'2"        Respiratory Rate: 20 bpm Temperature: 36

.8 (C) / 

98.3 (F)                                Weight: 210 lbs 

 

                2018      Blood Pressure 1: 136/70 Code: 8480-6 BMI: 38.0 

Code: 39843-0 Heart 

Rate 1: 68 bpm  Height: 5'2"    Respiratory Rate: 20 bpm SpO2: 97%       Tempera

ture: 36.8

(C) / 98.2 (F)                          Weight: 211 lbs 

 

                2018      Blood Pressure 1: 140/65 Code: 8480-6 Heart Rate

 1: 75 bpm 

Respiratory Rate: 24 bpm SpO2: 95%           Temperature: 37.0 (C) / 98.6 (F) We

ight: 211 

lbs 

 

                2018      Blood Pressure 1: 154/70 Code: 8480-6 BMI: 37.6 

Code: 80493-4 Heart 

Rate 1: 76 bpm  Height: 5'2"    Respiratory Rate: 20 bpm SpO2: 98%       Tempera

ture: 36.9

(C) / 98.5 (F)                          Weight: 209 lbs 

 

                2017      Blood Pressure 1: 156/70 Code: 8480-6 BMI: 37.1 

Code: 08121-2 Heart 

Rate 1: 72 bpm  Height: 5'2"    Respiratory Rate: 20 bpm SpO2: 97%       Tempera

ture: 37.0

(C) / 98.6 (F)                          Weight: 206 lbs 

 

                2017      Blood Pressure 1: 152/78 Code: 8480-6 BMI: 36.8 

Code: 74178-6 Heart 

Rate 1: 78 bpm  Height: 5'2"    Respiratory Rate: 20 bpm SpO2: 98%       Tempera

ture: 36.1

(C) / 97.0 (F)                          Weight: 204 lbs 

 

                2017      Blood Pressure 1: 142/70 Code: 8480-6 BMI: 36.9 

Code: 40002-7 Heart 

Rate 1: 64 bpm  Height: 5'2"    Respiratory Rate: 20 bpm SpO2: 96%       Tempera

ture: 36.5

(C) / 97.7 (F)                          Weight: 205 lbs 

 

                2017      Blood Pressure 1: 142/68 Code: 8480-6 Heart Rate

 1: 88 bpm 

Respiratory Rate: 18 bpm SpO2: 98%           Temperature: 36.3 (C) / 97.3 (F) We

ight: 209 

lbs 

 

                2016      Blood Pressure 1: 130/78 Code: 8480-6 Heart Rate

 1: 68 bpm 

Respiratory Rate: 20 bpm SpO2: 95%           Temperature: 36.4 (C) / 97.6 (F) We

ight: 212 

lbs 

 

                2016      Blood Pressure 1: 122/64 Code: 8480-6 BMI: 39.1 

Code: 41078-5 Heart 

Rate 1: 76 bpm      Height: 5'2"        Respiratory Rate: 20 bpm Temperature: 36

.8 (C) / 

98.2 (F)                                Weight: 217 lbs 

 

                2016      Blood Pressure 1: 144/70 Code: 8480-6 BMI: 39.4 

Code: 89745-4 Heart 

Rate 1: 76 bpm      Height: 5'2"        Respiratory Rate: 20 bpm Temperature: 36

.6 (C) / 

97.9 (F)                                Weight: 219 lbs 

 

                2015      Blood Pressure 1: 152/60 Code: 8480-6 BMI: 39.6 

Code: 12994-0 Heart 

Rate 1: 84 bpm      Height: 5'2"        Respiratory Rate: 20 bpm Temperature: 37

.0 (C) / 

98.6 (F)                                Weight: 220 lbs 

 

                2015      Blood Pressure 1: 146/76 Code: 8480-6 BMI: 39.8 

Code: 25255-5 Heart 

Rate 1: 88 bpm      Height: 5'2"        Respiratory Rate: 20 bpm Temperature: 37

.0 (C) / 

98.6 (F)                                Weight: 221 lbs 

 

                2015      Blood Pressure 1: 132/70 Code: 8480-6 BMI: 39.1 

Code: 12784-5 Heart 

Rate 1: 88 bpm      Height: 5'2"        Respiratory Rate: 20 bpm Temperature: 36

.4 (C) / 

97.6 (F)                                Weight: 217 lbs 

 

                2015      Blood Pressure 1: 132/66 Code: 8480-6 BMI: 39.9 

Code: 94109-8 Heart 

Rate 1: 72 bpm      Height: 5'2"        Respiratory Rate: 20 bpm Temperature: 36

.9 (C) / 

98.4 (F)                                Weight: 218 lbs 

 

                2015      Blood Pressure 1: 136/80 Code: 8480-6 Heart Rate

 1: 76 bpm 

Respiratory Rate: 20 bpm  Temperature: 36.7 (C) / 98.0 (F) Weight: 224 lbs 

 

                2015      Blood Pressure 1: 134/78 Code: 8480-6 BMI: 39.7 

Code: 79528-7 Heart 

Rate 1: 84 bpm      Height: 5'2"        Respiratory Rate: 20 bpm Temperature: 36

.7 (C) / 

98.0 (F)                                Weight: 217 lbs 

 

                2014      Blood Pressure 1: 146/78 Code: 8480-6 BMI: 39.5 

Code: 31211-1 Heart 

Rate 1: 82 bpm      Height: 5'2"        Respiratory Rate: 18 bpm Temperature: 35

.6 (C) / 

96.1 (F)                                Weight: 216 lbs 

 

                2014      Blood Pressure 1: 134/70 Code: 8480-6 BMI: 37.9 

Code: 13638-5 Heart 

Rate 1: 80 bpm      Height: 5'3"        Respiratory Rate: 20 bpm Temperature: 36

.8 (C) / 

98.2 (F)                                Weight: 214 lbs 

 

                2014      Blood Pressure 1: 132/70 Code: 8480-6 BMI: 37.6 

Code: 95976-8 Heart 

Rate 1: 80 bpm      Height: 5'3"        Respiratory Rate: 20 bpm Temperature: 36

.8 (C) / 

98.3 (F)                                Weight: 212 lbs 

 

                2014      Blood Pressure 1: 116/74 Code: 8480-6 Heart Rate

 1: 68 bpm 

Respiratory Rate: 20 bpm  Temperature: 36.2 (C) / 97.1 (F) Weight: 212 lbs 

 

                10/15/2013      Blood Pressure 1: 132/82 Code: 8480-6 BMI: 37.4 

Code: 88111-5 Heart 

Rate 1: 76 bpm      Height: 5'3"        Respiratory Rate: 20 bpm Temperature: 36

.7 (C) / 

98.0 (F)                                Weight: 211 lbs 

 

                    2013          Blood Pressure 1: 130/76 Code: 8480-6 He

art Rate 1: 78 bpm

 

                2013      Blood Pressure 1: 140/82 Code: 8480-6 BMI: 36.8 

Code: 45562-0 Heart 

Rate 1: 66 bpm      Height: 5'3"        Respiratory Rate: 20 bpm Temperature: 36

.1 (C) / 

96.9 (F)                                Weight: 208 lbs 

 

                2013      Blood Pressure 1: 138/80 Code: 8480-6 BMI: 36.4 

Code: 63170-7 Heart 

Rate 1: 72 bpm      Height: 5'4"        Respiratory Rate: 20 bpm Temperature: 36

.7 (C) / 

98.0 (F)                                Weight: 212 lbs 

 

                2013      Blood Pressure 1: 124/70 Code: 8480-6 BMI: 36.9 

Code: 24408-9 Heart 

Rate 1: 60 bpm      Height: 5'4"        Temperature: 36.1 (C) / 97.0 (F) Weight:

 215 lbs 

 

                05/10/2013      Blood Pressure 1: 132/86 Code: 8480-6 BMI: 36.9 

Code: 54477-6 Heart 

Rate 1: 76 bpm      Height: 5'4"        Respiratory Rate: 20 bpm Temperature: 36

.8 (C) / 

98.2 (F)                                Weight: 215 lbs 

 

                2013      Blood Pressure 1: 134/82 Code: 8480-6 BMI: 36.6 

Code: 26422-0 Heart 

Rate 1: 72 bpm      Height: 5'4"        Respiratory Rate: 20 bpm Temperature: 36

.3 (C) / 

97.4 (F)                                Weight: 213 lbs 

 

                2012      Blood Pressure 1: 142/80 Code: 8480-6 BMI: 37.1 

Code: 10579-3 Heart 

Rate 1: 76 bpm      Height: 5'4"        Respiratory Rate: 20 bpm Temperature: 36

.8 (C) / 

98.3 (F)                                Weight: 216 lbs 

 

                10/23/2012      Blood Pressure 1: 128/68 Code: 8480-6 BMI: 37.6 

Code: 03205-2 Heart 

Rate 1: 72 bpm      Height: 5'4"        Respiratory Rate: 20 bpm Temperature: 36

.6 (C) / 

97.9 (F)                                Weight: 219 lbs 

 

                10/10/2012      Blood Pressure 1: 122/70 Code: 8480-6 Heart Rate

 1: 76 bpm 

Respiratory Rate: 20 bpm  Temperature: 36.7 (C) / 98.1 (F) Weight: 218 lbs 

 

                    2012          Blood Pressure 1: 132/80 Code: 8480-6 He

art Rate 1: 84 bpm

 

                2012      Blood Pressure 1: 128/78 Code: 8480-6 BMI: 38.1 

Code: 45414-0 Heart 

Rate 1: 84 bpm      Height: 5'4"        Respiratory Rate: 20 bpm Temperature: 36

.9 (C) / 

98.4 (F)                                Weight: 222 lbs 

 

                2012      Blood Pressure 1: 138/80 Code: 8480-6 BMI: 37.9 

Code: 13213-2 Heart 

Rate 1: 74 bpm      Height: 5'4"        Temperature: 36.1 (C) / 97.0 (F) Weight:

 221 lbs 

 

                2012      Blood Pressure 1: 126/78 Code: 8480-6 BMI: 38.4 

Code: 55784-7 Heart 

Rate 1: 72 bpm      Height: 5'4"        Respiratory Rate: 20 bpm Temperature: 36

.7 (C) / 

98.0 (F)                                Weight: 224 lbs 

 

                2012      Blood Pressure 1: 138/72 Code: 8480-6 BMI: 38.4 

Code: 53360-8 Heart 

Rate 1: 72 bpm      Height: 5'4"        Respiratory Rate: 20 bpm Temperature: 36

.6 (C) / 

97.9 (F)                                Weight: 224 lbs 

 

                2012      Blood Pressure 1: 122/78 Code: 8480-6 BMI: 38.8 

Code: 67517-1 Heart 

Rate 1: 88 bpm      Height: 5'4"        Respiratory Rate: 20 bpm Temperature: 36

.6 (C) / 

97.8 (F)                                Weight: 226 lbs 

 

                2012      Blood Pressure 1: 116/60 Code: 8480-6 BMI: 38.1 

Code: 51128-2 Heart 

Rate 1: 92 bpm      Height: 5'4"        Respiratory Rate: 20 bpm Temperature: 36

.8 (C) / 

98.2 (F)                                Weight: 222 lbs 

 

                2011      Blood Pressure 1: 118/62 Code: 8480-6 BMI: 37.9 

Code: 77767-8 Heart 

Rate 1: 80 bpm      Height: 5'4"        Temperature: 36.5 (C) / 97.7 (F) Weight:

 221 lbs 

 

                2011      Blood Pressure 1: 132/70 Code: 8480-6 Heart Rate

 1: 84 bpm 

Respiratory Rate: 20 bpm  Temperature: 36.7 (C) / 98.0 (F) Weight: 221 lbs 

 

                2011      Blood Pressure 1: 114/72 Code: 8480-6 BMI: 37.6 

Code: 72249-8 Heart 

Rate 1: 76 bpm      Height: 5'4"        Respiratory Rate: 20 bpm Temperature: 36

.8 (C) / 

98.3 (F)                                Weight: 219 lbs 

 

                    2011          Blood Pressure 1: 136/76 Code: 8480-6 Te

mperature: 36.0 (C) / 96.8 

(F)                                     Weight: 219 lbs 8 oz

 

                2011      Blood Pressure 1: 128/80 Code: 8480-6 Heart Rate

 1: 72 bpm 

Temperature: 36.3 (C) / 97.4 (F)        Weight: 218 lbs 

 

                2011      Blood Pressure 1: 126/70 Code: 8480-6 Heart Rate

 1: 72 bpm 

Temperature: 36.7 (C) / 98.0 (F)

 

                    2010          Blood Pressure 1: 126/78 Code: 8480-6 Te

mperature: 36.2 (C) / 97.1 

(F)                                     Weight: 217 lbs 8 oz

 

                2010      Blood Pressure 1: 116/70 Code: 8480-6 Heart Rate

 1: 72 bpm 

Temperature: 36.4 (C) / 97.6 (F)        Weight: 222 lbs 

 

                2010      Blood Pressure 1: 114/78 Code: 8480-6 Heart Rate

 1: 72 bpm 

Temperature: 37.1 (C) / 98.8 (F)        Weight: 225 lbs 

 

                2010      Blood Pressure 1: 128/78 Code: 8480-6 Heart Rate

 1: 76 bpm 

Temperature: 36.6 (C) / 97.8 (F)        Weight: 224 lbs 

 

                2010      Blood Pressure 1: 116/78 Code: 8480-6 Heart Rate

 1: 80 bpm 

Temperature: 36.4 (C) / 97.6 (F)        Weight: 226 lbs 

 

                2010      Blood Pressure 1: 120/70 Code: 8480-6 BMI: 38.3 

Code: 90083-0 Heart 

Rate 1: 88 bpm      Height: 5'5"        Temperature: 36.4 (C) / 97.6 (F) Weight:

 230 lbs 







Functional Status

No Functional Status data



Reason For Visit





                    Reason For Visit    Effective Dates     Notes

 

                    follow up           2020           

 

                    blood pressure check 2020           

 

                    high blood pressure 2020           

 

                    lab draw            2019           

 

                    lab draw            10/11/2019           

 

                    hearing loss        2019          left ear

 

                    follow up           06/10/2019           

 

                    follow up           2019          Patient has upcoming

 appointment with Dr Claire and carotid 

dopplers tomorrow

 

                    follow up           2018          Patient had heart ca

th on 10-30-18 and one of the bypass 

veins in spasming

 

                    follow up           2018          4 Week

 

                    follow up           10/02/2018           

 

                    follow up           2018           

 

                    high blood pressure 2018          Dr Claire has ordered

 holter monitor and 

hydralazine 50mg PRN

 

                    dizziness           2018          On  morning darren delvalle woke up and went to the bathroom 

and after lying down became very dizzy. Patient has hx of vertigo so didn't 
think anything of it. She usually just has them intermittently, but had more 
that day. While at [employer] began to feel very ill and became very shaky. She got 
home and sat in the recliner and had the jittery/nervous feeling. Later that 
night it finally resolved. Patient feels like she had a spell like this around 
the  and thought it was due to extreme heat exhaustion.

 

                    follow up           2018           

 

                    back pain           2018           

 

                    otalgia             2018           

 

                    back pain           2018           

 

                    injection(s)        10/06/2017          flu shot

 

                    lab draw            2017           

 

                    follow up           2017           

 

                    pelvic pain         2017          Patient states pain 

started in May with a "Constant Ache"

to left inguinal area. Patient states at that time pain was intermittent. Then, 
approximately 2nd week  of  pain worsened and radiates to left low back 
area.

 

                    lab draw            2017           

 

                    hyperglycemia       2017           

 

                    cough               2017           

 

                    otalgia             2016           

 

                    injection(s)        10/07/2016          Influenza

 

                    lab draw            2016           

 

                    constipation        2016           

 

                    flank pain          2016           

 

                    follow up           2015          3mo fwup

 

                    injection(s)        10/16/2015          Influenza

 

                    acute renal failure 09/15/2015           

 

                    lab draw            09/10/2015           

 

                    fatigue             2015           

 

                    otalgia             2015           

 

                    pain, limb          2015           

 

                    follow up           2015          Hospital fwup

 

                    high blood pressure 2015           

 

                    injection(s)        10/17/2014          flu shot

 

                    sinusitis           2014           

 

                    high blood pressure 2014           

 

                    cough               2014          Was panfilo at Dr Tyree teixeira's office so he started he on amlodipine 

10mg but seems to be dragging her down. Patient decreased to 1/2 tablet this 
last week

 

                    lab draw            2014           

 

                    cough               2014           

 

                    cough               10/15/2013           

 

                    high blood pressure 2013           

 

                    follow up           2013          ER

 

                    dizziness           2013           

 

                    follow up           2013           

 

                    otalgia             05/10/2013           

 

                    breast complaint    2013           

 

                    lab draw            2012           

 

                    follow up           2012          2mo fwup

 

                    follow up           10/23/2012          2wk fwup

 

                    gas and bloating    10/10/2012          Dr Claire has her mon

itoring because was high in his 

office at last visit

 

                    injection(s)        2012           

 

                    dizziness           2012           

 

                    dizziness           2012           

 

                    lab draw            2012           

 

                    muscle weakness     2012          concerns of simvasta

tin with fatigue and muscle 

weakness

 

                    leg pain/sciatica   2012           

 

                    hip pain            2012           

 

                    back pain           2012          has tried ice pack, 

muscle relaxers, and moist heat

 

                    back pain           2012           

 

                    fatigue             2012          in hands/feet

 

                    otalgia             2011           

 

                    follow up           2011          2wk fwup

 

                    back pain           2011          thinks ulcer may hav

e returned

 

                    lab draw            2011           

 

                    dizziness           2011          Left ear and facial 

pain, right ear pain 

 

                    follow up           2011          1wk fwup

 

                    hip pain            2011          feels very draggy, f

atigue, BP 80/63, normally running 

100's/60's

 

                    chills              2010           

 

                    injection(s)        10/06/2010          flu shot

 

                    follow up           2010          6wk fwup, had HH rep

aired and is starting to feel better, 

started on reglan 10mg tid

 

                    follow up           2010          hosp fwup

 

                    follow up           2010          1mo fwup, saw Dr Danna du and hasn't gave cardiac clearance 

yet for HH repair, did have chemical stress test yesterday and waiting on 
results

 

                    follow up           2010          from EGD/colonoscopy

--has Hiatal Hernia and wants to do 

repair

 

                    follow up           2010           







Encounters





             Encounter    Performer    Location     Codes        Date

 

                                        ([id number]) OFFICE/OUTPATIENT VISIT EST

Diagnosis: Essential (primary) hypertension[ICD10: I10]

Diagnosis: Palpitations[ICD10: R00.2] Akanksha GUNN 

Makara                    CPT-4: 67412              2020

 

                                        ([id number]) OFFICE/OUTPATIENT VISIT EST

Diagnosis: Essential hypertension[ICD10: I10]

Diagnosis: Palpitations[ICD10: R00.2]

Diagnosis: Stress reaction[ICD10: F43.0] Akanksha SPENCERER Makara            CPT-4: 19921              2020

 

                                        ([id number]) NURSE/OUTPATIENT VISIT EST

Diagnosis: Mixed hyperlipidemia[ICD10: E78.2] Akanksha BAKER 

[employer] [facility]            CPT-4: 19911              2019

 

                                        (45525) NURSE/OUTPATIENT VISIT EST

Diagnosis: FLU VACCINE[ICD10: Z23] Akanksha KEY [facility]                       CPT-4: 01247              10/22/2019

 

                                        (59678) NURSE/OUTPATIENT VISIT EST

Diagnosis: Chronic kidney disease, stage 1[ICD10: N18.1] Akanksha DRIVER DO [facility] CPT-4: 34719              10/11/2019

 

                                        (55286) OFFICE/OUTPATIENT VISIT EST

Diagnosis: Acute serous otitis media, left ear[ICD10: H65.02] Nat DRIVER DO [facility] CPT-4: 01931              2019

 

                                        (45467) OFFICE/OUTPATIENT VISIT EST

Diagnosis: Essential (primary) hypertension[ICD10: I10]

Diagnosis: Coronary atherosclerosis due to calcified coronary lesion[ICD10: 
I25.84]

Diagnosis: Hypoglycemia, unspecified[ICD10: E16.2]

Diagnosis: Other fatigue[ICD10: R53.83]

Diagnosis: Urinary tract infection, site not specified[ICD10: N39.0] Akanksha DRIVER DO [facility] CPT-4: 91151        06/10/2019

 

                                        (44782) OFFICE/OUTPATIENT VISIT EST

Diagnosis: Essential (primary) hypertension[ICD10: I10]

Diagnosis: Gastro-esophageal reflux disease without esophagitis[ICD10: K21.9]

Diagnosis: Urinary tract infection, site not specified[ICD10: N39.0] Akanksha DRIVER [employer] [facility] CPT-4: 87944        2019

 

                                        ([id number]) OFFICE/OUTPATIENT VISIT EST

Diagnosis: Gastro-esophageal reflux disease without esophagitis[ICD10: K21.9]

Diagnosis: Epigastric pain[ICD10: R10.13] Akanksha DRIVER [employer]            CPT-4: 25096              2018

 

                                        (42708) OFFICE/OUTPATIENT VISIT EST

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

Diagnosis: Essential (primary) hypertension[ICD10: I10]

Diagnosis: Generalized anxiety disorder[ICD10: F41.1]

Diagnosis: Gastro-esophageal reflux disease without esophagitis[ICD10: K21.9] 

Akanksha DRIVER [employer] [facility] CPT-4: 06803        2018

 

                                        OFFICE/OUTPATIENT VISIT EST

Diagnosis: Gastro-esophageal reflux disease without esophagitis[ICD10: K21.9]

Diagnosis: Palpitations[ICD10: R00.2]

Diagnosis: Urinary tract infection, site not specified[ICD10: N39.0]

Diagnosis: Generalized anxiety disorder[ICD10: F41.1] Akanksha DRIVER [facility] CPT-4: 64775              10/02/2018

 

                                        (00619) NURSE/OUTPATIENT VISIT EST

Diagnosis: Coronary atherosclerosis due to calcified coronary lesion[ICD10: 
I25.84]

Diagnosis: Hypoglycemia, unspecified[ICD10: E16.2]

Diagnosis: Dizziness and giddiness[ICD10: R42]

Diagnosis: Occlusion and stenosis of bilateral carotid arteries[ICD10: I65.23] 

Akanksha SPENCERRed [facility] CPT-4: 06313        2018

 

                                        OFFICE/OUTPATIENT VISIT EST

Diagnosis: Epigastric pain[ICD10: R10.13]

Diagnosis: Generalized anxiety disorder[ICD10: F41.1]

Diagnosis: FLU VACCINE[ICD10: Z23] Akanksha SPENCER

[facility]                       CPT-4: 73592              2018

 

                                        ([id number]) OFFICE/OUTPATIENT VISIT EST

Diagnosis: Essential (primary) hypertension[ICD10: I10]

Diagnosis: Hypoglycemia, unspecified[ICD10: E16.2]

Diagnosis: Gastro-esophageal reflux disease without esophagitis[ICD10: K21.9] 

Akanksha SPENCERRed [facility] CPT-4: 92680        2018

 

                                        ([id number]) OFFICE/OUTPATIENT VISIT EST

Diagnosis: Dizziness and giddiness[ICD10: R42] Nat SPENCERRed [facility]            CPT-4: 91178              2018

 

                                        (15850) OFFICE/OUTPATIENT VISIT EST

Diagnosis: Cervicalgia[ICD10: M54.2]

Diagnosis: Other spondylosis with radiculopathy, cervical region[ICD10: M47.22] 

Akanksha SPENCERRed [facility] CPT-4: 65010        2018

 

                                        (77582) OFFICE/OUTPATIENT VISIT EST

Diagnosis: Hypoglycemia, unspecified[ICD10: E16.2]

Diagnosis: Other spondylosis with radiculopathy, cervical region[ICD10: M47.22]

Diagnosis: Vertigo of central origin, bilateral[ICD10: H81.43]

Diagnosis: Cervicocranial syndrome[ICD10: M53.0] Akanksha DRIVER [facility]         CPT-4: 55669              2018

 

                                        ([id number]) OFFICE/OUTPATIENT VISIT EST

Diagnosis: Benign paroxysmal vertigo, bilateral[ICD10: H81.13]

Diagnosis: Otalgia, bilateral[ICD10: H92.03] Nat DRIVER [facility]            CPT-4: 43972              2018

 

                                        ([id number]) OFFICE/OUTPATIENT VISIT EST

Diagnosis: Low back pain[ICD10: M54.5]

Diagnosis: Radiculopathy, lumbosacral region[ICD10: M54.17]

Diagnosis: Left lower quadrant pain[ICD10: R10.32] Akanksha DRIVER [facility]         CPT-4: 18670              2018

 

                                        (58011) OFFICE/OUTPATIENT VISIT EST

Diagnosis: FLU VACCINE[ICD10: Z23] Akanksha KEY [employer] 

[facility]                       CPT-4: 10408              10/06/2017

 

                                        (98800) OFFICE/OUTPATIENT VISIT EST

Diagnosis: Mixed hyperlipidemia[ICD10: E78.2]

Diagnosis: Essential (primary) hypertension[ICD10: I10]

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

Diagnosis: Impaired fasting glucose[ICD10: R73.01]

Diagnosis: Other fatigue[ICD10: R53.83] Akanksha Otoolerodo        AKANKSHA DRIVER

[facility]                    CPT-4: 53677              2017

 

                                        (82099) OFFICE/OUTPATIENT VISIT EST

Diagnosis: Pain in thoracic spine[ICD10: M54.6]

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 

Akanksha Villa DRIVER [employer] [facility] CPT-4: 79861        2017

 

                                        (51316) OFFICE/OUTPATIENT VISIT EST

Diagnosis: Left lower quadrant pain[ICD10: R10.32]

Diagnosis: Low back pain[ICD10: M54.5] Akanksha SYKES 

[facility]                    CPT-4: 09037              2017

 

                                        (05599) OFFICE/OUTPATIENT VISIT EST

Diagnosis: Mixed hyperlipidemia[ICD10: E78.2]

Diagnosis: Essential (primary) hypertension[ICD10: I10]

Diagnosis: Hypoglycemia, unspecified[ICD10: E16.2] Akanksha SPENCERRed [facility]         CPT-4: 53282              2017

 

                                        (66692) OFFICE/OUTPATIENT VISIT EST

Diagnosis: Impaired fasting glucose[ICD10: R73.01]

Diagnosis: Mastodynia[ICD10: N64.4]

Diagnosis: Mixed hyperlipidemia[ICD10: E78.2]

Diagnosis: Essential (primary) hypertension[ICD10: I10]

Diagnosis: Other fatigue[ICD10: R53.83] Akanksha SPENCERRed [facility]                    CPT-4: 55116              2017

 

                                        ([id number]) OFFICE/OUTPATIENT VISIT EST

Diagnosis: Acute upper respiratory infection, unspecified[ICD10: J06.9] Luba SPENCERRed [facility] CPT-4: 03677        2017

 

                                        (24532) OFFICE/OUTPATIENT VISIT EST

Diagnosis: Acute mastoiditis without complications, right ear[ICD10: H70.001] 

Akanksha SPENCERRed [facility] CPT-4: 46026        2016

 

                                        (81669) OFFICE/OUTPATIENT VISIT EST

Diagnosis: FLU VACCINE[ICD10: Z23] Akanksha SPENCER

[facility]                       CPT-4: 66573              10/07/2016

 

                                        (94687) OFFICE/OUTPATIENT VISIT EST

Diagnosis: Mixed hyperlipidemia[ICD10: E78.2]

Diagnosis: Essential (primary) hypertension[ICD10: I10]

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

Diagnosis: Impaired fasting glucose[ICD10: R73.01] Akanksha SPENCERRed [facility]         CPT-4: 15577              2016

 

                                        (14917) OFFICE/OUTPATIENT VISIT EST

Diagnosis: Constipation, unspecified[ICD10: K59.00] Akanksha DRIVER DO [facility] CPT-4: 92067              2016

 

                                        (99718) OFFICE/OUTPATIENT VISIT EST

Diagnosis: Essential (primary) hypertension[ICD10: I10]

Diagnosis: Mixed hyperlipidemia[ICD10: E78.2]

Diagnosis: Chronic kidney disease, stage 1[ICD10: N18.1] Akanksha DRIVER DO [facility] CPT-4: 60955              2016

 

                                        (64994) OFFICE/OUTPATIENT VISIT EST

Diagnosis: Muscle weakness (generalized)[ICD10: M62.81]

Diagnosis: Dizziness and giddiness[ICD10: R42]

Diagnosis: Essential (primary) hypertension[ICD10: I10]

Diagnosis: History of falling[ICD10: Z91.81] Akanksha DRIVER DO [facility]            CPT-4: 48169              2015

 

                                        (73080) OFFICE/OUTPATIENT VISIT EST

Diagnosis: FLU VACCINE[ICD10: Z23] Akanksha KEY [facility]                       CPT-4: 24244              10/16/2015

 

                                        (13894) OFFICE/OUTPATIENT VISIT EST

Diagnosis: Acute renal failure[ICD9: 584.9]

Diagnosis: POLYURIA[ICD9: 788.42] Akanksha LANCE [facility]                       CPT-4: 04309              09/15/2015

 

                                        (55336) OFFICE/OUTPATIENT VISIT EST

Diagnosis: HYPERLIPIDEMIA NEC/NOS[ICD9: 272.4]

Diagnosis: HYPERTENSION[ICD9: 401.9]

Diagnosis: CAD[ICD9: 414.00]

Diagnosis: Hyperglycemia[ICD9: 790.29]

Diagnosis: MALAISE AND FATIGUE[ICD9: 780.79] Akanksha DRIVER DO [facility]            CPT-4: 66793              09/10/2015

 

                                        ([id number]) OFFICE/OUTPATIENT VISIT EST

Diagnosis: MALAISE AND FATIGUE[ICD9: 780.79]

Diagnosis: HYPOGLYCEMIA[ICD9: 251.2]

Diagnosis: Grieving[ICD9: 309.0]

Diagnosis: ABDOMINAL PAIN[ICD9: 789.00] Akanksha SPENCERER

[facility]                    CPT-4: 38037              2015

 

                                        OFFICE/OUTPATIENT VISIT EST

Diagnosis: CERUMEN IMPACTION[ICD9: 380.4]

Diagnosis: EUSTACHIAN TUBE DYSFUNCTION[ICD9: 381.81] Bertha DRIVER DO [facility] CPT-4: 04916              2015

 

                                        (73938) OFFICE/OUTPATIENT VISIT EST

Diagnosis: Leg pain[ICD9: 729.5]

Diagnosis: SUPERFIC PHLEBITIS-LEG[ICD9: 451.0] Akanksha DRIVER [facility]            CPT-4: 71033              2015

 

                                        (77143) OFFICE/OUTPATIENT VISIT EST

Diagnosis: Thoracic back pain[ICD9: 724.1]

Diagnosis: SPASM OF MUSCLE[ICD9: 728.85] Akanksha DRIVER [facility]            CPT-4: 87061              2015

 

                                        (93722) OFFICE/OUTPATIENT VISIT EST

Diagnosis: DIZZINESS/VERTIGO[ICD9: 780.4]

Diagnosis: Benign positional vertigo[ICD9: 386.11]

Diagnosis: HYPERTENSION[ICD9: 401.9]

Diagnosis: Suspicious nevus[ICD9: 238.2] Akanksha DRIVER [facility]            CPT-4: 29859              2015

 

                                        (97568) OFFICE/OUTPATIENT VISIT EST

Diagnosis: FLU VACCINE[ICD10: Z23] Akanksha KEY [facility]                       CPT-4: 75022              10/17/2014

 

                                        OFFICE/OUTPATIENT VISIT EST

Diagnosis: SINUSITIS, ACUTE[ICD9: 461.9]

Diagnosis: EUSTACHIAN TUBE DYSFUNCTION[ICD9: 381.81] Bertha DRIVER DO [facility] CPT-4: 01033              2014

 

                                        (76713) OFFICE/OUTPATIENT VISIT EST

Diagnosis: DIZZINESS/VERTIGO[ICD9: 780.4]

Diagnosis: HYPERTENSION[ICD9: 401.9] Akanksha MEJIA [facility]                       CPT-4: 02642              2014

 

                                        (79335) OFFICE/OUTPATIENT VISIT EST

Diagnosis: HYPERTENSION[ICD9: 401.9]

Diagnosis: MALAISE AND FATIGUE[ICD9: 780.79]

Diagnosis: SINUSITIS, ACUTE[ICD9: 461.9] Akanksha SPENCERRed [facility]            CPT-4: 93166              2014

 

                                        (01449) OFFICE/OUTPATIENT VISIT EST

Diagnosis: HYPERLIPIDEMIA NEC/NOS[ICD9: 272.4]

Diagnosis: HYPERTENSION[ICD9: 401.9]

Diagnosis: B12 DEFIC ANEMIA NEC[ICD9: 281.1]

Diagnosis: HYPOGLYCEMIA[ICD9: 251.2] Akanksha OTOOLE

[facility]                       CPT-4: 37974              2014

 

                                        OFFICE/OUTPATIENT VISIT EST

Diagnosis: COUGH[ICD9: 786.2] Bertha SPENCERRed [facility] 

CPT-4: 75568                            2014

 

                                        OFFICE/OUTPATIENT VISIT EST

Diagnosis: COUGH[ICD9: 786.2]

Diagnosis: URI, ACUTE[ICD9: 465.9] Bertha SPENCER

[facility]                       CPT-4: 27088              10/15/2013

 

                                        (71721) OFFICE/OUTPATIENT VISIT EST

Diagnosis: HYPERTENSION[ICD9: 401.9]

Diagnosis: CEPHALGIA[ICD9: 784.0]

Diagnosis: ANXIETY STATE NOS[ICD9: 300.00]

Diagnosis: FLU VACCINE[ICD9: V04.81] Akanksha OTOOLE

[facility]                       CPT-4: 91869              2013

 

                                        (45158) OFFICE/OUTPATIENT VISIT EST

Diagnosis: DIZZINESS/VERTIGO[ICD9: 780.4]

Diagnosis: SINUSITIS, ACUTE[ICD9: 461.9]

Diagnosis: Benign positional vertigo[ICD9: 386.11] Akanksha SPENCERRed [facility]         CPT-4: 86094              2013

 

                                        OFFICE/OUTPATIENT VISIT EST

Diagnosis: OTITIS MEDIA NOS[ICD9: 382.9] Akanksha SPENCERRed [facility]            CPT-4: 14844              2013

 

                                        OFFICE/OUTPATIENT VISIT EST

Diagnosis: Perforation of ear drum[ICD9: 384.20]

Diagnosis: SINUSITIS, ACUTE[ICD9: 461.9] Akanksha DRIVER DO [facility]            CPT-4: 01079              05/10/2013

 

                                        (27440) OFFICE/OUTPATIENT VISIT EST

Diagnosis: Mastalgia[ICD9: 611.71] Akanksha KEY [facility]                       CPT-4: 09576              2013

 

                                        (35511) OFFICE/OUTPATIENT VISIT EST

Diagnosis: HYPERLIPIDEMIA NEC/NOS[ICD9: 272.4]

Diagnosis: HYPERTENSION[ICD9: 401.9]

Diagnosis: DIZZINESS/VERTIGO[ICD9: 780.4]

Diagnosis: Hyperglycemia[ICD9: 790.29]

Diagnosis: MALAISE AND FATIGUE[ICD9: 780.79] Akanksha DRIVER [facility]            CPT-4: 94756              2012

 

                                        (13941) OFFICE/OUTPATIENT VISIT EST

Diagnosis: EUSTACHIAN TUBE DYSFUNCTION[ICD9: 381.81]

Diagnosis: DIZZINESS/VERTIGO[ICD9: 780.4]

Diagnosis: ABDOMINAL PAIN[ICD9: 789.00]

Diagnosis: GERD[ICD9: 530.81]

Diagnosis: CERUMEN IMPACTION[ICD9: 380.4] Akanksha [facility] [employer] [facility]            CPT-4: 68470              2012

 

                                        OFFICE/OUTPATIENT VISIT EST

Diagnosis: ABDOMINAL PAIN[ICD9: 789.00]

Diagnosis: DYSPEPSIA[ICD9: 536.8] Akanksha LANCE [facility]                       CPT-4: 19604              10/23/2012

 

                                        (36199) OFFICE/OUTPATIENT VISIT EST

Diagnosis: ABDOMINAL PAIN[ICD9: 789.00]

Diagnosis: DYSPEPSIA[ICD9: 536.8]

Diagnosis: IBS[ICD9: 564.1] Akanksha DRIVER [facility] CPT

-

4: 13001                                10/10/2012

 

                                        OFFICE/OUTPATIENT VISIT EST

Diagnosis: DIZZINESS/VERTIGO[ICD9: 780.4]

Diagnosis: HYPOGLYCEMIA[ICD9: 251.2] Akanksha MEJIA [facility]                       CPT-4: 95274              2012

 

                                        (01630) OFFICE/OUTPATIENT VISIT EST

Diagnosis: URINARY TRACT INFECTION[ICD9: 599.0] Akanksha DRIVER [facility]            CPT-4: 80638              2012

 

                                        (57522) OFFICE/OUTPATIENT VISIT EST

Diagnosis: HYPERLIPIDEMIA NEC/NOS[ICD9: 272.4]

Diagnosis: HYPERTENSION[ICD9: 401.9]

Diagnosis: MALAISE AND FATIGUE[ICD9: 780.79]

Diagnosis: DIZZINESS/VERTIGO[ICD9: 780.4] Akanksha [facility] [employer] [facility]            CPT-4: 77393              2012

 

                                        (75112) OFFICE/OUTPATIENT VISIT EST

Diagnosis: DIZZINESS/VERTIGO[ICD9: 780.4]

Diagnosis: MALAISE AND FATIGUE[ICD9: 780.79]

Diagnosis: HYPERTENSION[ICD9: 401.9] Akanksha MEJIA [facility]                       CPT-4: 85551              2012

 

                                        OFFICE/OUTPATIENT VISIT EST

Diagnosis: LUMB/LUMBOSAC DISC DEGEN[ICD9: 722.52]

Diagnosis: Radiculopathy of leg[ICD9: 724.4]

Diagnosis: SACROILIITIS NEC[ICD9: 720.2]

Diagnosis: SPINAL ENTHESOPATHY[ICD9: 720.1] Akanksha DRIVER [facility]            CPT-4: 00748              2012

 

                                        (44873) OFFICE/OUTPATIENT VISIT EST

Diagnosis: PAIN, LOWER BACK[ICD9: 724.2]

Diagnosis: SPASM OF MUSCLE[ICD9: 728.85]

Diagnosis: SCIATICA[ICD9: 724.3]

Diagnosis: Lumbar degenerative disc disease[ICD9: 722.52] Akanksha DRIVER [facility] CPT-4: 62292              2012

 

                                        (95840) OFFICE/OUTPATIENT VISIT EST

Diagnosis: PAIN IN THORACIC SPINE[ICD9: 724.1]

Diagnosis: SPASM OF MUSCLE[ICD9: 728.85] Akanksha DRIVER DO [facility]            CPT-4: 47051              2012

 

                                        (72044) OFFICE/OUTPATIENT VISIT EST

Diagnosis: PAIN, LOWER BACK[ICD9: 724.2]

Diagnosis: SPASM OF MUSCLE[ICD9: 728.85]

Diagnosis: Sacroiliac dysfunction[ICD9: 739.4]

Diagnosis: Lumbar degenerative disc disease[ICD9: 722.52] Akanksha DRIVER DO [facility] CPT-4: 78498              2012

 

                                        OFFICE/OUTPATIENT VISIT EST

Diagnosis: MALAISE AND FATIGUE[ICD9: 780.79]

Diagnosis: HYPOTENSION[ICD9: 458.9]

Diagnosis: CAD[ICD9: 414.00] Akanksha DRIVER DO [facility] 

CPT-4: 79083                            2012

 

                                        OFFICE/OUTPATIENT VISIT EST

Diagnosis: SINUSITIS, ACUTE[ICD9: 461.9]

Diagnosis: PHARYNGITIS, ACUTE[ICD9: 462]

Diagnosis: CERUMEN IMPACTION[ICD9: 380.4] Akanksha [facility]            CPT-4: 14533              2011

 

                                        OFFICE/OUTPATIENT VISIT EST

Diagnosis: PAIN IN THORACIC SPINE[ICD9: 724.1]

Diagnosis: GERD[ICD9: 530.81]

Diagnosis: DIZZINESS/VERTIGO[ICD9: 780.4] Akanksha Villa [facility]            CPT-4: 50155              2011

 

                OFFICE/OUTPATIENT VISIT EST Akanksha OTOOLE

NDECASI CASE [facility] CPT-

4: 65977                                2011

 

                    ([id number]) OFFICE/OUTPATIENT VISIT EST Akanksha Orender  CORNELIUS

UELINE SAramis OTOOLENDER  

[facility]                       CPT-4: 80814              2011

 

                                        (62832) OFFICE/OUTPATIENT VISIT, EST

Diagnosis: PAIN, LOWER BACK[ICD9: 724.2] Akanksha Xiangrodo KEVINQUELINE SAramis

 

[facility]            CPT-4: 49874              2011

 

                    (30112) OFFICE/OUTPATIENT VISIT, EST Akanksha Xiangndangela  DORITA

FELECIAMARCY OLIVIA OTOOLENDER 

[facility]                       CPT-4: 33906              2011

 

                    (12042) OFFICE/OUTPATIENT VISIT, EST Akankshazina Otoolendangela  DORITA

FELECIAMARCY OLIVIA DRIVER DO

[facility]                       CPT-4: 43752              2010

 

                    (19658) OFFICE/OUTPATIENT VISIT, JED BAKER ORENDER DO

[facility]                       CPT-4: 43704              2010

 

                    (09338) OFFICE/OUTPATIENT VISIT, JED [place] SAramis ORENDER DO

[facility]                       CPT-4: 58116              2010

 

                    (07777) OFFICE/OUTPATIENT VISIT, JED BAKER ORENDER DO

[facility]                       CPT-4: 42791              2010

 

                    (17571) OFFICE/OUTPATIENT VISIT, JED OTOOLENDER DO

[facility]                       CPT-4: 48829              2010

 

                    (91757) OFFICE/OUTPATIENT VISIT, JED OTOOLENDER DO

[facility]                       CPT-4: 38252              2010







Plan of Care





             Planned Activity Notes        Codes        Status       Date

 

                          Visit Diagnosis Plan: Essential (primary) hypertension

 Discussion: Improving 

with higher dose of metoprolol Recheck 2weeks

                                        ICD-9 : 401.9

ICD-10 : I10

                                                    2020

 

                          Visit Diagnosis Plan: Palpitations Discussion: Continu

e higher dose of 

metoprolol

                                        ICD-9 : 785.1

ICD-10 : R00.2

                                                    2020

 

                                        Appointment: Akanksha Driver

WPtel:[phone]

                                        [address] [place] [address]                                              FOLLOW UP       2020

 

                                        Appointment: Akanksha Driver

WPtel:[phone]

                                        [address] [place], [place] [id number]

              2020--moved up to Tues 20 at 4pm (km)                 [place]

NCELED        2020

 

                          Visit Diagnosis Plan: Palpitations Discussion: Check C

BC, CMP, TSH

                                        ICD-9 : 785.1

ICD-10 : R00.2

                                                    2020

 

                          Visit Diagnosis Plan: Essential hypertension Discussio

n: Increase metoprolol to 

25mg q AM and 50mg po q pM Recheck 1 week

                                        ICD-9 : 401.9

ICD-10 : I10

                                                    2020

 

                                        Appointment: Akanksha Driver

WPtel:[phone]

                                        [address] [place] [address]                                              BP CHECK        2020

 

                                        Appointment: Akanksha Driver

WPtel:[phone]

                                        [address] [place] [address]                                              ACUTE ILLNESS   2020

 

                          Patient Education: metoprolol tartrate- [employer] [facility] [id number] 

[email or web address]/samplemd/resources/[email or web address]

                                        Completed           2020

 

                    Care Plan: X-RAY EXAM NECK SPINE 4/5VWS                     

LOINC : 21334-9

                          Pending                   2020

 

                    Care Plan: X-RAY EXAM THORAC SPINE4/>VW                     

LOINC : 59112-2

                          Pending                   2020

 

                                        Appointment: Akanksha Driver

WPtel:[phone]

                                        [address] [place] [address]

[place]                                              LAB             2019

 

                                        Appointment: Akanksha Driver

WPtel:[phone]

                                        [address] [place] [address]

[place]                                              INJECTION       10/22/2019

 

             Patient Education: INFLUENZA VACCINE [facility]                           

Completed    10/22/2019

 

                                        Appointment: Akanksha Driver

WPtel:[phone]

                                        [address] [place] [address]

[place]                                              LAB             10/11/2019

 

                          Visit Diagnosis Plan: Acute serous otitis media, left 

ear Discussion: instructed

to use flonase and claritin daily for symptom relief. discussed with patient 
that if no improvement in 2 weeks, will need to follow up with ENT for audiology
exam. instructed to call office with any other symptoms such as otalgia, 
dysphagia, fever, etc.

                                        ICD-9 : 381.01

ICD-10 : H65.02

                                                    2019

 

                                        Appointment: Nat Lozoya

                                        [address] [place] [address]                                              ACUTE ILLNESS   2019

 

                          Visit Diagnosis Plan: Urinary tract infection, site no

t specified Discussion: 

Started an old abx prescription

                                        ICD-9 : 599.0

ICD-10 : N39.0

                                                    06/10/2019

 

                          Visit Diagnosis Plan: Hypoglycemia, unspecified Discus

madeleine: Monitor BS At least 

6 small meals a day each with protein

                                        ICD-9 : 251.2

ICD-10 : E16.2

                                                    06/10/2019

 

                          Visit Diagnosis Plan: Essential (primary) hypertension

 Discussion: Stable Check 

CMP

                                        ICD-9 : 401.9

ICD-10 : I10

                                                    06/10/2019

 

                          Visit Diagnosis Plan: Other fatigue Discussion: Check 

CBC, TSH

                                        ICD-9 : 780.79

ICD-10 : R53.83

                                                    06/10/2019

 

                                        Appointment: Akanksha Driver

WPtel:[phone]

                                        [address] [place], [place] [address]

[place]                                              FOLLOW UP       06/10/2019

 

                          Visit Diagnosis Plan: Essential (primary) hypertension

 Discussion: Stable Sees 

Dr. Clements this month

                                        ICD-9 : 401.9

ICD-10 : I10

                                                    2019

 

                          Visit Diagnosis Plan: Urinary tract infection, site no

t specified Discussion: 

Macrobid 100mg po BID for 1 week

                                        ICD-9 : 599.0

ICD-10 : N39.0

                                                    2019

 

                          Visit Diagnosis Plan: Gastro-esophageal reflux disease

 without esophagitis 

Discussion: Stable on omeprazole

Follow Up: 3 months

                                        ICD-9 : 530.81

ICD-10 : K21.9

                                                    2019

 

                                        Appointment: Akanksha Drivertel:[phone]

                                        [address] [place], [place] [address]

[place]                                              FOLLOW UP       2019

 

                          Patient Education: metoprolol tartrate- [employer] [facility] [id number] 

[email or web address]://[email or web address]/samplemd/resources/getResource/61/494g8c72-4dek-35hu-30

                                        Completed           2019

 

                                        Appointment: Aaknksha Drivertel:[phone]

                                        [address] [place], [place] [id number]

[place]                                              CANCELED        02/15/2019

 

                          Visit Diagnosis Plan: Gastro-esophageal reflux disease

 without esophagitis 

Discussion: Continue protonix and carafate Proceed with updated EGD

                                        ICD-9 : 530.81

ICD-10 : K21.9

                                                    2018

 

                          Visit Diagnosis Plan: Epigastric pain Discussion: [facility] CT abdomen/pelvis due to

ongoing abdominal pain

                                        ICD-9 : 789.06

ICD-10 : R10.13

                                                    2018

 

                                        Appointment: Akanksha Driver

WPtel:[phone]

                                        [facility]

[place]                                              FOLLOW UP       2018

 

                    Care Plan: CT PELVIS W/O DYE                     LOINC : 361

08-9

                          Pending                   2018

 

                    Care Plan: CT ABDOMEN W/O DYE                     LOINC : 36

103-0

                          Pending                   2018

 

                    Care Plan: Referral Order                     SNOMED-CT : 30

[id number]

                          Pending                   2018

 

                                        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

art disease of native coronary artery 

without angina pectoris                 Discussion: S/P cardiac cath--doing medi

vicki management 

with imdur and metoprolol increased Has fwup with cardiology next week Discussed
cardiac rehab

                                        ICD-9 : 414.00

ICD-10 : I25.10

                                                    2018

 

                          Visit Diagnosis Plan: Generalized anxiety disorder Dis

cussion: Stable

                                        ICD-9 : 300.00

ICD-10 : F41.1

                                                    2018

 

                          Visit Diagnosis Plan: Gastro-esophageal reflux disease

 without esophagitis 

Discussion: Continue protonix at BID dosing and carafate BID

Follow Up: 2 months

                                        ICD-9 : 530.81

ICD-10 : K21.9

                                                    2018

 

                          Visit Diagnosis Plan: Essential (primary) hypertension

 Discussion: Stable

                                        ICD-9 : 401.9

ICD-10 : I10

                                                    2018

 

                                        Appointment: Akanksha Driver

WPtel:[phone]

                                        [address] [facility]

[place]                                              FOLLOW UP       2018

 

                          Visit Diagnosis Plan: Gastro-esophageal reflux disease

 without esophagitis 

Discussion: Continue protonix at BID dosing Continue carafate at q AC and HS 
dosing for 2 more weeks then decrease to BID dosing

Follow Up: 4 weeks

                                        ICD-9 : 530.81

ICD-10 : K21.9

                                                    10/02/2018

 

                          Visit Diagnosis Plan: Urinary tract infection, site no

t specified Discussion: 

Reculture urine

                                        ICD-9 : 599.0

ICD-10 : N39.0

                                                    10/02/2018

 

                          Visit Diagnosis Plan: Generalized anxiety disorder Dis

cussion: Increase xanax to

BID dosing especially with upcoming cardiac testing which is already making 
patient more anxious and worried

                                        ICD-9 : 300.00

ICD-10 : F41.1

                                                    10/02/2018

 

                          Visit Diagnosis Plan: Palpitations Discussion: Bobby walter going to schedule 

Lexiscan

                                        ICD-9 : 785.1

ICD-10 : R00.2

                                                    10/02/2018

 

                                        Appointment: Akanksha Driver

WPtel:[phone]

                                        [address] [place] [address]                                              FOLLOW UP       10/02/2018

 

             Patient Education: Patient Medication Summary                      

     Completed    10/02/2018

 

                                        Appointment: Akanksha Driver

WPtel:[phone]

                                        [address] [place] [address]

[place]                                              LAB             2018

 

             Patient Education: Patient Medication Summary                      

     Completed    2018

 

                          Visit Diagnosis Plan: Epigastric pain Discussion: Cont

inue protonix and carafate

but make into a slurry Flu shot given Discussed EGD if symptoms persist

Follow Up: 2 weeks

                                        ICD-9 : 789.06

ICD-10 : R10.13

                                                    2018

 

                          Visit Diagnosis Plan: Generalized anxiety disorder Dis

cussion: Did discuss 

increased risk of benzodiazepines causing dementia but at this time will stay at
xanax 0.25mg po BID

                                        ICD-9 : 300.00

ICD-10 : F41.1

                                                    2018

 

                                        Appointment: Akanksha Driver

WPtel:[phone]

                                        [address] [place] [address]                                              FOLLOW UP       2018

 

             Patient Education: Patient Medication Summary                      

     Completed    2018

 

                          Visit Diagnosis Plan: Essential (primary) hypertension

 Discussion: Has 

hydralazine to use prn per cardiology Going to do 30 day holter monitor

                                        ICD-9 : 401.9

ICD-10 : I10

                                                    2018

 

                          Visit Diagnosis Plan: Hypoglycemia, unspecified Discus

madeleine: 6 small meals a 

day--each with protein Increase fluid intake

                                        ICD-9 : 251.2

ICD-10 : E16.2

                                                    2018

 

                          Visit Diagnosis Plan: Gastro-esophageal reflux disease

 without esophagitis 

Discussion: Change to protonix 40mg po BID

Follow Up: 1 months

                                        ICD-9 : 530.81

ICD-10 : K21.9

                                                    2018

 

                                        Appointment: Akanksha Driver

WPtel:[phone]

                                        [address] [place] [address]                                              ACUTE ILLNESS   2018

 

             Patient Education: Patient Medication Summary                      

     Completed    2018

 

                          Visit Diagnosis Plan: Dizziness and giddiness Discussi

on: blood work to be 

completed in office including cmp, cbc, troponin to rule out glucose 
intolerance, infection, dehydration. instructed patient that she needs to see 
her cardiologist sooner than oct and patient requested we contact dr. clements's 
office. will have front office make appt. patient has carotid doppler annually.

                                        ICD-9 : 780.4

ICD-10 : R42

                                                    2018

 

                                        Appointment: Nat Lozoya

                                        [address] [place] [address]                                              ACUTE ILLNESS   2018

 

             Patient Education: Patient Medication Summary                      

     Completed    2018

 

                          Visit Diagnosis Plan: Cervicalgia Discussion: Complete

 course of PT since 

symptoms improved Continue stretching exercises Notify if symptoms return or 
worsen

                                        ICD-9 : 723.1

ICD-10 : M54.2

                                                    2018

 

                                        Appointment: Akanksha Driver

WPtel:[phone]

                                        [address] [place] [address]                                              FOLLOW UP       2018

 

             Patient Education: Patient Medication Summary                      

     Completed    2018

 

                          Visit Diagnosis Plan: Hypoglycemia, unspecified Discus

madeleine: Eat something with 

protein every 2 hrs

                                        ICD-9 : 251.2

ICD-10 : E16.2

                                                    2018

 

                          Visit Diagnosis Plan: Other spondylosis with radiculop

athy, cervical region 

Discussion: Check MRI of cervical spine

                                        ICD-9 : 721.0

ICD-10 : M47.22

                                                    2018

 

                          Visit Diagnosis Plan: Vertigo of central origin, bilat

eral Discussion: Discussed

PT for vestibular therapy

                                        ICD-9 : 386.2

ICD-10 : H81.43

                                                    2018

 

                                        Appointment: Akanksha Driver

WPtel:[phone]

                                        [facility]                                                              2018

 

             Patient Education: Patient Medication Summary                      

     Completed    2018

 

                    Care Plan: MRI NECK SPINE W/O DYE                     LOINC 

: 86513-1

                          Pending                   2018

 

                          Visit Diagnosis Plan: Otalgia, bilateral Discussion: k

enalog 40 mg given to 

patient im. instructed patient to monitor blood sugar at home due to risk of 
increased sugar. instructed patient to rtc on wednesday if no improvement and 
may require antibiotic.

                                        ICD-9 : 388.70

ICD-10 : H92.03

                                                    2018

 

                          Visit Diagnosis Plan: Benign paroxysmal vertigo, bilat

eral Discussion: patient 

has meclizine at home but states it makes her too tired. instructed patient to 
cut in half and take at night. if it doesn't cause too much drowsiness, 
instructed to take bid until feeling better. instructed to rtc wed if no 
improvement or worsening symptoms. instructed patient to increase fluid intake 
as well.

                                        ICD-9 : 386.11

ICD-10 : H81.13

                                                    2018

 

                                        Appointment: Nat Lozoya

                                        [address] [place] [address]                                              ACUTE ILLNESS   2018

 

             Patient Education: Patient Medication Summary                      

     Completed    2018

 

                          Visit Diagnosis Plan: Left lower quadrant pain Discuss

ion: Culture urine Notify 

if worsens

                                        ICD-9 : 789.04

ICD-10 : R10.32

                                                    2018

 

                          Visit Diagnosis Plan: Low back pain Discussion: Stretc

hes Topical aspercreme 

Tylenol 1 po TID

                                        ICD-9 : 724.2

ICD-10 : M54.5

                                                    2018

 

                          Visit Diagnosis Plan: Radiculopathy, lumbosacral regio

n Discussion: As above

                                        ICD-9 : 724.4

ICD-10 : M54.17

                                                    2018

 

                                        Appointment: Akanksha Driver

WPtel:[phone]

                                        [address] [place] [address]                                              ACUTE ILLNESS   2018

 

             Patient Education: Patient Medication Summary                      

     Completed    2018

 

                                        Appointment: Akanksha Driver

WPtel:[phone]

                                        [address] [place] [address]

[place]                                              INJECTION       10/06/2017

 

             Patient Education: Patient Medication Summary                      

     Completed    10/06/2017

 

                                        Appointment: Akanksha Driver

WPtel:[phone]

                                        [address] [place], [place] [address]

US                                              LAB             2017

 

             Patient Education: Patient Medication Summary                      

     Completed    2017

 

                          Visit Diagnosis Plan: Pain in thoracic spine Discussio

n: PT has helped Continue 

stretches and walking [facility]

                                        ICD-9 : 724.1

ICD-10 : M54.6

                                                    2017

 

                          Visit Diagnosis Plan: Other intervertebral disc degene

ration, lumbar region 

Follow Up: 3 months

                                        ICD-9 : 722.52

ICD-10 : M51.36

                                                    2017

 

                                        Appointment: Akanksha Drivertel:[phone]

                                        [address] [place] [address]

[place]                                              FOLLOW UP       2017

 

             Patient Education: Patient Medication Summary                      

     Completed    2017

 

                          Visit Diagnosis Plan: Low back pain Discussion: Start 

PT for low back- Seems 

like abdominal pain is radiating from low back

Follow Up: 5 weeks

                                        ICD-9 : 724.2

ICD-10 : M54.5

                                                    2017

 

                          Visit Diagnosis Plan: Left lower quadrant pain Discuss

ion: Had CT scan of 

abdomen/pelvis in 2017 and normal colonoscopy in 2015

                                        ICD-9 : 789.04

ICD-10 : R10.32

                                                    2017

 

                                        Appointment: Akanksha Driver

WPtel:[phone]

                                        [address] [place] [address]                                              ACUTE ILLNESS   2017

 

             Patient Education: Patient Medication Summary                      

     Completed    2017

 

                                        Appointment: Akanksha Driver

WPtel:[phone]

                                        [address] [place] [address]                                              LAB             2017

 

             Patient Education: Patient Medication Summary                      

     Completed    2017

 

                          Visit Diagnosis Plan: Mixed hyperlipidemia Discussion:

 Check lipids

                                        ICD-9 : 272.4

ICD-10 : E78.2

                                                    2017

 

                          Visit Diagnosis Plan: Impaired fasting glucose Discuss

ion: Return in AM for CMP,

HbA1C Accuchecks daily Diet/Exercise discussed at length

                                        ICD-9 : 790.29

ICD-10 : R73.01

                                                    2017

 

                          Visit Diagnosis Plan: Other fatigue Discussion: Check 

CBC, TSH

                                        ICD-9 : 780.79

ICD-10 : R53.83

                                                    2017

 

                          Visit Diagnosis Plan: Mastodynia Discussion: Check Jace

ateral diagnostic 

mammogram with US

                                        ICD-9 : 611.71

ICD-10 : N64.4

                                                    2017

 

                                        Appointment: Akanksah Driver

WPtel:[phone] [address]

[id number]

[place]              3/7 confirmed `sl                 ACUTE ILLNESS   2017

 

             Patient Education: Patient Medication Summary                      

     Completed    2017

 

                    Care Plan: MAMMOGRAM SCREENING                     LOINC : 2

6347-5

                          Pending                   2017

 

                          Visit Diagnosis Plan: Acute upper respiratory infectio

n, unspecified Discussion:

Exam is reassuring Likely viral illness Supportive care reviewed and encouraged 
Follow up PRN

                                        ICD-9 : 465.9

ICD-10 : J06.9

                                                    2017

 

                                        Appointment: Luba Stevenson 

                                        [address] [place] [address]                                              ACUTE ILLNESS   2017

 

             Patient Education: Patient Medication Summary                      

     Completed    2017

 

                          Visit Plan:               Supportive care. Rest, Fluid

s, Tylenol/Motrin prn fever or 

bodyaches. Notify if worsening symptoms.

                                                            2016

 

                                        Appointment: Akanksha Driver

WPtel:[phone]

                                        [address] [place] [address]                                              ACUTE ILLNESS   2016

 

             Patient Education: Patient Medication Summary                      

     Completed    2016

 

                                        Appointment: Akanksha Driver

WPtel:[phone]

                                        [address] [place] [address]

[place]                                              INJECTION       10/07/2016

 

             Patient Education: Patient Medication Summary                      

     Completed    10/07/2016

 

                                        Appointment: Akanksha Driver

WPtel:[phone]

                                        [address] [place] [address]                                              LAB             2016

 

             Patient Education: Patient Medication Summary                      

     Completed    2016

 

                          Visit Plan:               Add miralax daily Use daily 

probiotic and metamucil and push fluids

Notify if worsens/persists

                                                            2016

 

                                        Appointment: Akanksha Driver

WPtel:[phone]

                                        [address] [place] [address]              16 confirmed ~sl                 ACUTE ILLNESS   2016

 

             Patient Education: Patient Medication Summary                      

     Completed    2016

 

                          Visit Plan:               No NSAIDs Kidney function di

scussed Discussed hydration Monitor lab

every 4months so will check CMP, HbA1C next month

                                                            2016

 

                                        Appointment: Akanksha Driver

WPtel:[phone]

                                        [facility]

              03/15 confirmed ~sl                 ACUTE ILLNESS   2016

 

             Patient Education: Patient Medication Summary                      

     Completed    2016

 

                          Visit Plan:               Vestibular exercises Refill 

flonase Strict low Na diet--discussed 

that needs to read labels Rx for walker with chair given due to muscle 
weakness/unsteadiness Has carotids and abdominal aorta and legs checked in 
January Check Chem 7

                                                            2015

 

                                        Appointment: Akanksha Driver

WPtel:[phone]

                                        [address] [place], [place] [id number]

              12/15/15 appt confirmed cn                 FOLLOW UP       

 

             Patient Education: Patient Medication Summary                      

     Completed    2015

 

             Patient Education: Vertigo                           Completed    1

2015

 

                                        Appointment: Akanksha Driver

WPtel:[phone]

                                        [address] [place], [place] [id number]                                              INJECTION       10/16/2015

 

             Patient Education: Patient Medication Summary                      

     Completed    10/16/2015

 

                                        Appointment: Akanksha Driver

WPtel:[phone]

                                        [address] [place], [place] [id number]                                              UA              09/15/2015

 

             Patient Education: Patient Medication Summary                      

     Completed    09/15/2015

 

                                        Referral: Landon De La Torre

WPtel:[phone]

#1 [facility]              Referral                        Completed       2015

 

                                        Appointment: Akanksha Driver

WPtel:[phone]

                                        [address] [place], [place] [id number]                                              LAB             09/10/2015

 

             Patient Education: Patient Medication Summary                      

     Completed    09/10/2015

 

                          Visit Plan:               Check CMP, CBC, TSH, Free T4

, B12, Lipids, HbA1C Discussed 6 small 

meals a day each with protein Would likely benefit from antidepressant Update 
colonoscopy

                                                            2015

 

                                        Appointment: Akanksha Driver

WPtel:[phone]

                                        [address] [place], [place] [id number]

              9/8/15 confirmed                 ACUTE ILLNESS   2015

 

             Patient Education: Patient Medication Summary                      

     Completed    2015

 

                          Visit Plan:               Cerumen flush with warm wate

r and peroxide - good results Resume 

Nasonex nasal spray daily Recommended Debrox earwax removal drops

                                                            2015

 

                                        Appointment: Bertha Mon

WPtel:[phone]

                                        [address] [place] [address]                                              ACUTE ILLNESS   2015

 

             Patient Education: Patient Medication Summary                      

     Completed    2015

 

                          Visit Plan:               Continue aspirin daily Add m

eloxicam for 1week Elevate and Ice Call

on 2015

 

                                        Appointment: Akanksha Driver

WPtel:[phone]

                                        [address] [place] [address]                                              ACUTE ILLNESS   2015

 

             Patient Education: Patient Medication Summary                      

     Completed    2015

 

                          Visit Plan:               Been using baclofen at [facility] and has helped some PT for next 

2-4weeks

                                                            2015

 

                                        Appointment: Akanksha Driver

WPtel:[phone]

                                        [address] [place] [address] Follow Up 2015

 

             Patient Education: Patient Medication Summary                      

     Completed    2015

 

                                        Appointment: Akanksha Driver

WPtel:[phone]

                                        [address] [place] [address]                                              LAB             2015

 

                                        Appointment: Akanksha Driver

WPtel:[phone]

                                        [address] [place] [address]              feeling better, weather -                 FOLLOW UP       

 

                                        Referral: Landon De La Torre

WPtel:[phone]

1 [facility]              Referral                        Appointment Requested 2015

 

                          Visit Plan:               Continue exercise and curren

t meds See surgery for removal of right

arm lesion

                                                            2015

 

                                        Appointment: Akanksha Driver

WPtel:[phone]

                                        [address] [place], [place] [address]                                              FOLLOW UP       2015

 

             Patient Education: Patient Medication Summary                      

     Completed    2015

 

                                        Appointment: Akanksha Driver

WPtel:[phone]

                                        [address] [place], [place] [id number]

[place]                                              INJECTION       10/17/2014

 

             Patient Education: Patient Medication Summary                      

     Completed    10/17/2014

 

                                        Appointment: Bertha Mon

WPtel:[phone]

                                        [address] [place] [address]                                              ACUTE ILLNESS   2014

 

             Patient Education: Patient Medication Summary                      

     Completed    2014

 

                          Visit Plan:               Discussed that likely lumbar

 etiology for leg weakness Will change 

amlodopine to low dose dyazide and see if helps legs and inner ear

                                                            2014

 

                                        Appointment: Akanksha Driver

WPtel:[phone]

                                        [address] [place] [address]                                              FOLLOW UP       2014

 

             Patient Education: Patient Medication Summary                      

     Completed    2014

 

                          Visit Plan:               Ceftin and continue claritin

/flonase Decrease amlodopine to 2.5mg q

HS until fwup with Card due to weakness

                                                            2014

 

                                        Appointment: Akanksha Driver

WPtel:[phone]

                                        [address] [place] [address]                                              ACUTE ILLNESS   2014

 

             Patient Education: Patient Medication Summary                      

     Completed    2014

 

                                        Appointment: Akanksha Driver

WPtel:[phone]

                                        [address] [place] [address]                                              LAB             2014

 

             Patient Education: Patient Medication Summary                      

     Completed    2014

 

                                        Appointment: Bertha Mon

WPtel:[phone]

                                        [address] [place] [address]                                              ACUTE ILLNESS   2014

 

             Patient Education: Patient Medication Summary                      

     Completed    2014

 

                          Visit Plan:               Supportive care. Rest, Fluid

s, Tylenol prn fever or bodyaches. 

Notify if worsening symptoms. Ceftin

                                                            10/15/2013

 

                                        Appointment: Bertha Mon

WPtel:[phone]

                                        [address] [place] [address]                                              ACUTE ILLNESS   10/15/2013

 

             Patient Education: Patient Medication Summary                      

     Completed    10/15/2013

 

                                        Appointment: Akanksha Driver

WPtel:[phone]

                                        [address] [place], [place] [id number]                                              BP CHECK        2013

 

             Patient Education: Patient Medication Summary                      

     Completed    2013

 

                                        Appointment: Akanksha Driver

WPtel:[phone]

                                        [address] [place], [place] [id number]

                                              ER Follow UP    2013

 

             Patient Education: Patient Medication Summary                      

     Completed    2013

 

                          Visit Plan:               Restart flonase BID Use mecl

izine 25mg q HS Vestibular exercises 

Claritin 10mg q AM See ENT if doesn't resolve

                                                            2013

 

                                        Appointment: Akanksha Driver

WPtel:[phone]

                                        [address] [place] [address]                                              ACUTE ILLNESS   2013

 

             Patient Education: Patient Medication Summary                      

     Completed    2013

 

                          Visit Plan:               Will continue to observe

                                                            2013

 

                                        Appointment: Akanksha Driver

WPtel:[phone]

                                        [address] [place] [address]                                              FOLLOW UP       2013

 

             Patient Education: Patient Medication Summary                      

     Completed    2013

 

                          Visit Plan:               ERx for Augmentin 875mg q12 

x 10 days and Floxin otic drops 5 gtts 

AD x7days Sample of Nasonex per pt request Discussed treatment (nasal saline, 
salt water gargles, keeping right ear clean and dry, for worsening go to UC on 
weekend, Tylenol/ibuprofen, etc.) RTC 2 weeks for ear recheck.

                                                            05/10/2013

 

                                        Appointment: Jill Jean 

WPtel:[phone]

                                        [address] [place], [place] [id number]                                              ACUTE ILLNESS   05/10/2013

 

             Patient Education: Patient Medication Summary                      

     Completed    05/10/2013

 

                          Visit Plan:               Decrease caffeine intake Marina

ck Bilateral Mammogram with US of left 

breast

                                                            2013

 

                                        Appointment: Akanksha Driver

WPtel:[phone]

                                        [address] [place], [place] [id number]                                              ACUTE ILLNESS   2013

 

             Patient Education: Patient Medication Summary                      

     Completed    2013

 

                                        Appointment: Kate Hall

WPtel:[phone]

                                        [address] [place], [place] [id number]

              appt scheduled                  ACUTE ILLNESS   2013

 

                                        Appointment: Akanksha Drivertel:[phone]

                                        [address] [place], [place] [id number]

[place]                                              LAB             2012

 

             Patient Education: Patient Medication Summary                      

     Completed    2012

 

                          Visit Plan:               Continue off carafate and se

e how does Continue probiotic Add 

Vestibular exercises and use meclizine prn Continue Nasonex Check fasting lab 
including CMP, Lipids, CBC, TSH, Free T4, HbA1C

                                                            2012

 

                                        Appointment: Akanksha Driver

WPtel:[phone]

                                        [address] [place], [place] [id number]                                              FOLLOW UP       2012

 

             Patient Education: Patient Medication Summary                      

     Completed    2012

 

                          Visit Plan:               Continue carafate for 4more 

weeks at current dose then decrease to 

BID for 2wks then q HS

                                                            10/23/2012

 

                                        Appointment: Akanksha Driver

WPtel:[phone]

                                        [address] [place], [place] [id number]

                                              FOLLOW UP       10/23/2012

 

             Patient Education: Patient Medication Summary                      

     Completed    10/23/2012

 

                          Visit Plan:               Continue omeprazole Add Ellyn

fate 1gm po q AC Add daily probiotic

                                                            10/10/2012

 

                                        Appointment: Akanksha Driver

WPtel:[phone]

                                        [address] [place], [place] [id number]

                                              ACUTE ILLNESS   10/10/2012

 

             Patient Education: Patient Medication Summary                      

     Completed    10/10/2012

 

                                        Appointment: Akanksha Driver

WPtel:[phone]

                                        [address] [place], [place] [id number]

[place]                                              INJECTION       2012

 

             Patient Education: Patient Medication Summary                      

     Completed    2012

 

                          Visit Plan:               Diabetic Diet Accuchecks BID

 alternating times Hold onglyza and 

Januvia for now and continue diet and exercise and weight loss and moniter BS 
Discussed hypoglycemia and snack of peanut butter and crackers with juice or 
milk

                                                            2012

 

                                        Appointment: Akanksha Driver

WPtel:[phone]

                                        [address] [place], [place] [id number]

                                              WORK IN         2012

 

             Patient Education: Patient Medication Summary                      

     Completed    2012

 

                                        Appointment: Akanksha Driver

WPtel:[phone]

                                        [address] [place] [address]              2012

 

             Patient Education: Patient Medication Summary                      

     Completed    2012

 

                                        Appointment: Akanksha Driver

WPtel:[phone]

                                        [address] [place] [address]             2012

 

             Patient Education: Patient Medication Summary                      

     Completed    2012

 

                                        Appointment: Akanksha Driver

WPtel:[phone]

                                        [address] [place] [address]                                              ACUTE ILLNESS   2012

 

             Patient Education: Patient Medication Summary                      

     Completed    2012

 

                          Visit Plan:               Injection to Right SI joint 

as above Pt will call in 3 days on pain

Has PT starting in 2wks.

                                                            2012

 

                                        Appointment: Akanksha Driver

WPtel:[phone]

                                        [address] [place] [address]                                              ACUTE ILLNESS   2012

 

             Patient Education: Patient Medication Summary                      

     Completed    2012

 

                          Visit Plan:               OMT done Start PT May need u

pdated MRI if need to consider epidural

Prednisone

                                                            2012

 

                                        Appointment: Akanksha Driver

WPtel:[phone]

                                        [address] [place] [address]                                              OMT             2012

 

             Patient Education: Patient Medication Summary                      

     Completed    2012

 

                          Visit Plan:               Flexeril and Vimovo OMT done

 Daily stretches

                                                            2012

 

                                        Appointment: Akanksha Driver

WPtel:[phone]

                                        [address] [place] [address]                                              ACUTE ILLNESS   2012

 

             Patient Education: Patient Medication Summary                      

     Completed    2012

 

                          Visit Plan:               OMT done Daily stretches Vinod

st heat or biofreeze Right SI joint 

injection Call in 1week Vimovo BID

                                                            2012

 

                                        Appointment: Akanksha Driver

WPtel:[phone]

                                        [address] [place] [address]                                              ACUTE ILLNESS   2012

 

             Patient Education: Patient Medication Summary                      

     Completed    2012

 

                                        Appointment: Akanksha Drivertel:[phone]

                                        [address] [place] [address]                                              LAB             2012

 

             Patient Education: Patient Medication Summary                      

     Completed    2012

 

                          Visit Plan:               Decrease amlodopine to 1.25m

g daily Schedule with Cardiology 

Fasting lab in AM

                                                            2012

 

                                        Appointment: Akanksha Driver

WPtel:[phone]

                                        [address] [place] [address]                                              ACUTE ILLNESS   2012

 

             Patient Education: Patient Medication Summary                      

     Completed    2012

 

                          Visit Plan:               Saline nasal flushes prn. Ty

lenol/Motrin prn headache. Notify if 

persists/symptoms worsening. Cerumen removal from ears as above

                                                            2011

 

                                        Appointment: Akanksha Driver

WPtel:[phone]

                                        [address] [place] [address]                                              ACUTE ILLNESS   2011

 

             Patient Education: Patient Medication Summary                      

     Completed    2011

 

                                        Appointment: Akanksha Drivertel:[phone]

                                        [address] [place] [address]

[place]                                              INJECTION       10/05/2011

 

             Patient Education: Patient Medication Summary                      

     Completed    10/05/2011

 

                          Visit Plan:               Continue vimovo for 1more we

ek Increase Omeprazole to BID for 1mo 

then resume QD if stomach improved Vestibular exercises with meclizine q HS for 
next week

                                                            2011

 

                                        Appointment: Akanksha Drivertel:[phone]

                                        [address] [place] [address]                                              FOLLOW UP       2011

 

             Patient Education: Patient Medication Summary                      

     Completed    2011

 

                                        Appointment: Akanksha Driver

WPtel:[phone]

                                        [address] [place] [address]                                              ACUTE ILLNESS   2011

 

             Patient Education: Patient Medication Summary                      

     Completed    2011

 

                                        Appointment: Akanksha Driver

WPtel:[phone]

                                        [address] [place], [place] [address]                                              LAB             2011

 

             Patient Education: Patient Medication Summary                      

     Completed    2011

 

                          Visit Plan:               Saline nasal flushes prn. Ty

lenol/Motrin prn headache. Notify if 

persists/symptoms worsening. Add Veramyst Increase Omeprazole to 20mg po BID

                                                            2011

 

                                        Appointment: Akanksha Driver

WPtel:[phone]

                                        [address] [place] [address]                                              ACUTE ILLNESS   2011

 

             Patient Education: Patient Medication Summary                      

     Completed    2011

 

                                        Appointment: Akanksha Driver

WPtel:[phone]

                                        [address] [place] [address]                                              FOLLOW UP       2011

 

             Patient Education: Patient Medication Summary                      

     Completed    2011

 

                                        Appointment: Akanksha Driver

WPtel:[phone]

                                        [address] [place] [address]                                              ACUTE ILLNESS   2011

 

             Patient Education: Patient Medication Summary                      

     Completed    2011

 

                          Visit Plan:               Nasocourt sample given. Pt. 

will notify if symptoms are worse on 

Monday.

                                                            2010

 

                                        Appointment: Kate Hall

WPtel:[phone]

                                        [address] [place] [address]                                              ACUTE ILLNESS   2010

 

             Patient Education: Patient Medication Summary                      

     Completed    2010

 

                                        Appointment: Akanksha Driver

WPtel:[phone]

                                        [address] [place] [address]

[place]                                              INJECTION       10/06/2010

 

             Patient Education: Patient Medication Summary                      

     Completed    10/06/2010

 

                                        Appointment: Akanksha Drivre

WPtel:[phone]

                                        [address] [place] [address]                                              FOLLOW UP       2010

 

             Patient Education: Patient Medication Summary                      

     Completed    2010

 

             Patient Education: Lexapro                           Completed    0

2010

 

                          Visit Plan:               May proceed with hiatal mykel

ia repair per Card. so will contact Dr. Cash's office to proceed with surgery Trial of Lexapro 5mg QD plus use 
xanax prn

                                                            2010

 

                                        Appointment: Akanksha Driver

WPtel:[phone]

                                        [address] [place] [address]                                              FOLLOW UP       2010

 

             Patient Education: Patient Medication Summary                      

     Completed    2010

 

                          Visit Plan:               B12 given Cont oral B12 and 

iron Fwup 1mo for B12 Proceed with 

hiatal hernia repair once card clearance

                                                            2010

 

                                        Appointment: Akanksha Driver

WPtel:[phone]

                                        [address] [place] [address]                                              FOLLOW UP       2010

 

             Patient Education: Patient Medication Summary                      

     Completed    2010

 

                                        Appointment: Akanksha Driver

WPtel:[phone]

                                        [address] [place] [address]                                              FOLLOW UP       2010

 

             Patient Education: Patient Medication Summary                      

     Completed    2010

 

                                        Appointment: Akanksha Driver

WPtel:[phone]

                                        [address] [place] [address]

[place]                                              LAB             2010

 

             Patient Education: Patient Medication Summary                      

     Completed    2010

 

                          Visit Plan:               Check fasting lab in AM--CMP

,Lipids, CBC, Vit D, TSH,FreeT4, B12

                                                            2010

 

                                        Appointment: Akanksha Driver

WPtel:[phone]

                                        [address] [place] [address]                                              FOLLOW UP       2010

 

             Patient Education: Patient Medication Summary                      

     Completed    2010

 

                                        Referral: Landon De La Torre

WPtel:[phone]

[address] [place] [address]

[place]              Referral                        Appointment Requested  

 

                                        Referral: Landon De La Torre

WPtel:[phone]

[address]              Referral                        Initiated        







Instructions





                                        Comment

 

                                        . Supportive care.  Rest, Fluids, Tyleno

l/Motrin prn fever or bodyaches.  Notify

if worsening symptoms.



 

                                        . Add miralax daily

Use daily probiotic and metamucil and push fluids

Notify if worsens/persists

 

                                        . No NSAIDs

Kidney function discussed

Discussed hydration 

Monitor lab every 4months so will check CMP, HbA1C next month

 

                                        . Vestibular exercises

Refill flonase

Strict low Na diet--discussed that needs to read labels

Rx for walker with chair given due to muscle weakness/unsteadiness

Has carotids and abdominal aorta and legs checked in January

Check Chem 7



 

                                        . Check CMP, CBC, TSH, Free T4, B12, Lip

ids, HbA1C

Discussed 6 small meals a day each with protein

Would likely benefit from antidepressant 

Update colonoscopy

 

                                        . Cerumen flush with warm water and tyler

xide - good results 

Resume Nasonex nasal spray daily

Recommended Debrox earwax removal drops 

 

                                        . Continue aspirin daily

Add meloxicam for 1week

Elevate and Ice

Call on Monday

 

                                        . Been using baclofen at bedtime and has

 helped some

PT for next 2-4weeks



 

                                        . Continue exercise and current meds

See surgery for removal of right arm lesion

 

                                        . Discussed that likely lumbar etiology 

for leg weakness

Will change amlodopine to low dose dyazide and see if helps legs and inner ear

 

                                        . Ceftin and continue claritin/flonase

Decrease amlodopine to 2.5mg q HS until fwup with Card due to weakness

 

                                        .  Supportive care.  Rest, Fluids, Tylen

ol prn fever or bodyaches.  Notify if 

worsening symptoms.

Ceftin

 

                                        . Restart flonase BID

Use meclizine 25mg q HS

Vestibular exercises

Claritin 10mg q AM

See ENT if doesn't resolve

 

                                        . Will continue to observe



 

                                        . ERx for Augmentin 875mg q12 x 10 days 

and Floxin otic drops 5 gtts AD x7days

Sample of Nasonex per pt request

Discussed treatment (nasal saline, salt water gargles, keeping right ear clean 
and dry, for worsening go to [facility] on weekend, Tylenol/ibuprofen, etc.)

RTC 2 weeks for ear recheck.

 

                                        . Decrease caffeine intake

Check Bilateral Mammogram with US of left breast

 

                                        Left ear flushed with warm water and per

oxide with ear syringe and then last 

removed with charlie--peroxide drops instilled.  Tolerated well, no 
complications, TM intact.. Continue off carafate and see how does

Continue probiotic

Add Vestibular exercises and use meclizine prn

Continue Nasonex

Check fasting lab including CMP, Lipids, CBC, TSH, Free T4, HbA1C

 

                                        . Continue carafate for 4more weeks at c

urrent dose then decrease to BID for 

2wks then q HS

 

                                        . Continue omeprazole

Add Carafate 1gm po q AC

Add daily probiotic



 

                                        . Diabetic Diet

Accuchecks BID alternating times

Hold onglyza and Januvia for now and continue diet and exercise and weight loss 
and moniter BS

Discussed hypoglycemia and snack of peanut butter and crackers with juice or 
milk

 

                                        Informed Consent obtainded.  Right SI yasir

int cleansed with alcohol and betadine 

and injected with 3cc 1%l lidocaine with 40mg depomedrol and 40mg kenalog, 
tolerated well with no complications, neosporin and bandage applied. Injection 
to Right SI joint as above

Pt will call in 3 days on pain

Has PT starting in 2wks.

 

                                        . OMT done

Start PT

May need updated MRI if need to consider epidural

Prednisone

 

                                        . Flexeril and Vimovo

OMT done

Daily stretches

 

                                        Right SI joint cleansed with alchohol an

d betadine and injected with 3cc 1% 

lidocaine with 40mg depomedrol and 40mg kenalog, tolerated well with no 
complications, neosporin and bandage applied. OMT done

Daily stretches

Moist heat or biofreeze

Right SI joint injection

Call in 1week

Vimovo BID

 

                                        . Decrease amlodopine to 1.25mg daily

Schedule with Cardiology

Fasting lab in AM

 

                                        .  Saline nasal flushes prn. Tylenol/Mot

rin prn headache.  Notify if 

persists/symptoms worsening.

Cerumen removal from ears as above



 

                                        . Continue vimovo for 1more week

Increase Omeprazole to BID for 1mo then resume QD if stomach improved

Vestibular exercises with meclizine q HS for next week

 

                                        . Saline nasal flushes prn. Tylenol/Motr

in prn headache.  Notify if 

persists/symptoms worsening.

Add Veramyst

Increase Omeprazole to 20mg po BID

 

                                        . Nasocourt sample given.  Pt. will noti

fy if symptoms are worse on Monday. 

 

                                        . May proceed with hiatal hernia repair 

per Card. so will contact Dr. Cash's office to proceed with surgery



Trial of Lexapro 5mg QD plus use xanax prn

 

                                        . B12 given

Cont oral B12 and iron

Fwup 1mo for B12

Proceed with hiatal hernia repair once card clearance

 

                                        . Check fasting lab in AM--CMP,Lipids, C

BC, Vit D, TSH,FreeT4, B12







Medical Equipment

No Medical Equipment data



Health Concerns Section

Health Concerns data not found



Goals Section

Goals data not found



Interventions Section

Interventions data not found



Health Status Evaluations/Outcomes Section

Health Status Evaluations/Outcomes data not found



Advance Directives

No Advance Directive data

## 2020-02-19 NOTE — XMS REPORT
CCD document using C-CDA

                             Created on: 2020



Leilani Ramírez

External Reference #: [id number]

: 1940

Sex: Female



Demographics





                          Address                   [address] [place]  [address]

 

                          Home Phone                [phone]

 

                          Preferred Language        Unknown

 

                          Marital Status            

 

                          Spiritism Affiliation     Unknown

 

                          Race                      White

 

                          Ethnic Group              Not  or 





Author





                          Author                    Leilani Driver D.O.

 

                          Organization              [facility]

 

                          Address                   [address] [place]  [address]



 

                          Phone                     [phone]







Care Team Providers





                    Care Team Member Name Role                Phone

 

                    Akanksha Driver D.O. PP                  Unavailable

 

                          CCM                       Unavailable







Summary Purpose

Interface Exchange



Insurance Providers





             Payer name   Policy type / Coverage type Covered party ID Effective

 Begin Date 

Effective End Date

 

             [employer] [place] B [id number]  [id number]   

  Unknown

 

             [employer] Senior Supplemental Medicare Part B [id number]   [id number]    

 Unknown







Family history





Father



                          Diagnosis                 Age At Onset

 

                          Heart disease             Unknown







Mother



                          Diagnosis                 Age At Onset

 

                          Heart disease             Unknown

 

                          Cancer                    Unknown







Social History





                Social History Element Codes           Description     Effective

 Dates

 

                Tobacco history SNOMED CT: 509841554 Never smoker    2011

 

                Marital status  Unknown         Single          2010

 

                Number of children Unknown         9               2010







Allergies, Adverse Reactions, Alerts





           Substance  Reaction   Codes      Entered Date Inactivated Date Status

 

           _                     Unknown    2019 No Inactive Date Active

 

           * NO KNOWN FOOD ALLERGIES            Unknown    2010 No Inactiv

e Date Active

 

           _                     Unknown    2010 No Inactive Date Active

 

           QUINIDINE-QUININE ANALOGUES hives,     Unknown    2010 No Inact

diamante Date Active







Problems





                Condition       Codes           Effective Dates Condition Status

 

                          Essential (primary) hypertension ICD-9: 401.9

ICD-10: I10               2014                Active

 

                          Generalized anxiety disorder ICD-9: 300.00

ICD-10: F41.1             2018                Active

 

                          Palpitations              ICD-9: 785.1

ICD-10: R00.2             10/02/2018                Active

 

                          Stress reaction           ICD-9: 308.9

ICD-10: F43.0             2020                Active

 

                          Mixed hyperlipidemia      ICD-9: 272.4

ICD-10: E78.2             2019                Active

 

                          FLU VACCINE               ICD-9: V04.81

ICD-10: Z23               2012                Active

 

                          Acute serous otitis media, left ear ICD-9: 381.01

ICD-10: H65.02            2019                Active

 

                          Coronary atherosclerosis due to calcified coronary les

ion ICD-9: 414.00

ICD-10: I25.84            2018                Active

 

                          Hypoglycemia, unspecified ICD-9: 251.2

ICD-10: E16.2             2017                Active

 

                          Other fatigue             ICD-9: 780.79

ICD-10: R53.83            2017                Active

 

                          Urinary tract infection, site not specified ICD-9: 599

.0

ICD-10: N39.0             10/02/2018                Active

 

                          Gastro-esophageal reflux disease without esophagitis I

CD-9: 530.81

ICD-10: K21.9             2018                Active

 

                          Epigastric pain           ICD-9: 789.06

ICD-10: R10.13            2018                Active

 

                          Atherosclerotic heart disease of native coronary arter

y without angina pectoris 

ICD-9: 414.00

ICD-10: I25.10            2018                Active

 

                          Dizziness and giddiness   ICD-9: 780.4

ICD-10: R42               2014                Active

 

                          Occlusion and stenosis of bilateral carotid arteries I

CD-9: 433.10

ICD-10: I65.23            2018                Active

 

                          Cervicalgia               ICD-9: 723.1

ICD-10: M54.2             2018                Active

 

                          Other spondylosis with radiculopathy, cervical region 

ICD-9: 721.0

ICD-10: M47.22            2018                Active

 

                          Cervicocranial syndrome   ICD-9: 723.2

ICD-10: M53.0             2018                Active

 

                          Vertigo of central origin, bilateral ICD-9: 386.2

ICD-10: H81.43            2018                Active

 

                          Benign paroxysmal vertigo, bilateral ICD-9: 386.11

ICD-10: H81.13            2018                Active

 

                          Otalgia, bilateral        ICD-9: 388.70

ICD-10: H92.03            2018                Active

 

                          Left lower quadrant pain  ICD-9: 789.04

ICD-10: R10.32            2017                Active

 

                          Low back pain             ICD-9: 724.2

ICD-10: M54.5             2017                Active

 

                          Radiculopathy, lumbosacral region ICD-9: 724.4

ICD-10: M54.17            2018                Active

 

                          Impaired fasting glucose  ICD-9: 790.29

ICD-10: R73.01            2012                Active

 

                          Other intervertebral disc degeneration, lumbar region 

ICD-9: 722.52

ICD-10: M51.36            2017                Active

 

                          Pain in thoracic spine    ICD-9: 724.1

ICD-10: M54.6             2017                Active

 

                          Mastodynia                ICD-9: 611.71

ICD-10: N64.4             2017                Active

 

                          Acute upper respiratory infection, unspecified ICD-9: 

465.9

ICD-10: J06.9             2017                Active

 

                          Acute mastoiditis without complications, right ear ICD

-9: 383.00

ICD-10: H70.001           2016                Active

 

                          Constipation, unspecified ICD-9: 564.00

ICD-10: K59.00            2016                Active

 

                          Chronic kidney disease, stage 1 ICD-9: 585.1

ICD-10: N18.1             03/15/2016                Active

 

                          History of falling        ICD-9: V15.88

ICD-10: Z91.81            12/15/2015                Active

 

                          Muscle weakness (generalized) ICD-9: 780.79

ICD-10: M62.81            2015                Active

 

                Acute renal failure ICD-9: 584.9    2015      Active

 

                POLYURIA        ICD-9: 788.42   2015      Active

 

                CAD             ICD-9: 414.00   09/10/2015      Active

 

                Hyperglycemia   ICD-9: 790.29   2012      Active

 

                HYPERLIPIDEMIA NEC/NOS ICD-9: 272.4    09/10/2015      Active

 

                ABDOMINAL PAIN  ICD-9: 789.00   10/10/2012      Active

 

                Grieving        ICD-9: 309.0    2015      Active

 

                HYPOGLYCEMIA    ICD-9: 251.2    2012      Active

 

                MALAISE AND FATIGUE ICD-9: 780.79   2015      Active

 

                CERUMEN IMPACTION ICD-9: 380.4    2015      Active

 

                Leg pain        ICD-9: 729.5    2015      Active

 

                SUPERFIC PHLEBITIS-LEG ICD-9: 451.0    2015      Active

 

                SPASM OF MUSCLE ICD-9: 728.85   2015      Active

 

                Thoracic back pain ICD-9: 724.1    2015      Active

 

                Benign positional vertigo ICD-9: 386.11   2015      Active

 

                Suspicious nevus ICD-9: 238.2    2015      Active

 

                EUSTACHIAN TUBE DYSFUNCTION ICD-9: 381.81   2014      Acti

ve

 

                SINUSITIS, ACUTE ICD-9: 461.9    2014      Active

 

                DIZZINESS/VERTIGO ICD-9: 780.4    2014      Active

 

                HYPERTENSION    ICD-9: 401.9    2014      Active

 

                URI, ACUTE      ICD-9: 465.9    10/15/2013      Active

 

                CEPHALGIA       ICD-9: 784.0    2013      Active

 

                OTITIS MEDIA NOS ICD-9: 382.9    2013      Active

 

                Perforation of ear drum ICD-9: 384.20   05/10/2013      Active

 

                Mastalgia       ICD-9: 611.71   2013      Active

 

                DYSPEPSIA       ICD-9: 536.8    10/10/2012      Active

 

                IBS             ICD-9: 564.1    10/10/2012      Active

 

                FLU VACCINE     ICD-9: V04.81   2012      Active

 

                Radiculopathy of leg ICD-9: 724.4    2012      Active

 

                SCIATICA        ICD-9: 724.3    2012      Active

 

                Lumbar degenerative disc disease ICD-9: 722.52   2012     

 Active

 

                Sacroiliac dysfunction ICD-9: 739.4    2012      Active

 

                Hypertension    Unknown         2012      Active

 

                PAIN, LOWER BACK ICD-9: 724.2    2011      Active

 

                SPINAL ENTHESOPATHY ICD-9: 720.1    2011      Active

 

                Hypotension     ICD-9: 458.9    2011      Active

 

                SACROILIITIS NEC ICD-9: 720.2    2011      Active

 

                PHARYNGITIS, ACUTE ICD-9: 462      2010      Active

 

                URINARY TRACT INFECTION ICD-9: 599.0    2010      Active

 

                Heat exhaustion ICD-9: 992.5    2010      Active

 

                Esophagitis     ICD-9: 530.10   2010      Active

 

                Gastritis       ICD-9: 535.50   2010      Active

 

                GERD            ICD-9: 530.81   2010      Active

 

                Hiatal hernia   ICD-9: 553.3    2010      Active

 

                B12 DEFIC ANEMIA NEC ICD-9: 281.1    2010      Active

 

                Anxiety         Unknown         2010      Active

 

                Heart disease   Unknown         2010      Active

 

                Hyperlipidemia  Unknown         2010      Active

 

                ANXIETY STATE NOS ICD-9: 300.00   2010      Active

 

                DEPRESSIVE DISORDER NEC ICD-9: 311      2010      Active







Medications





          Medication Codes     Instructions Start Date Stop Date Status    Fill 

Instructions

 

                    metoprolol tartrate 25 mg tablet RxNorm: 791889      1 Table

t(s) Oral QAM and two 

tablets (50mg) in the evening--clarification/dose change 2020          

Active                                   

 

                    Flonase Allergy Relief 50 mcg/actuation nasal spray,suspensi

on RxNorm: 9085135     2

Spray Nasal every night at bedtime 2020      Inactive     

    

 

             simvastatin 40 mg tablet RxNorm: 234131 1 Tablet(s) Oral QD 

020   

10/19/2020                Active                     

 

                omeprazole 40 mg capsule,delayed release RxNorm: 458625  1 Capsu

le(s) Oral QD 

2020          10/19/2020          Active               

 

                    isosorbide mononitrate ER 30 mg tablet,extended release 24 h

r RxNorm: 011775      1 

Tablet(s) Oral every night at bedtime 2020      10/20/2020      Active    

       

 

                    metoprolol tartrate 25 mg tablet RxNorm: 399835      1 Table

t(s) Oral QAM and two 

tablets (50mg) in the evening 2020      Inactive         

 

                omeprazole 40 mg capsule,delayed release RxNorm: 426763  1 Capsu

le(s) Oral QD 

2020          Inactive             

 

                    Xanax 0.25 mg tablet RxNorm: 358891      TAKE 1 TABLET BY [facility] TWICE DAILY 1 

Tablet(s) Oral two times a day as needed as needed for anxiety 2020                Inactive                   

 

             simvastatin 40 mg tablet RxNorm: 138712 1 Tablet(s) Oral QD 

020   

2020                Inactive                   

 

                    metoprolol tartrate 25 mg tablet RxNorm: 955912      1 Table

t(s) Oral QAM and two 

tablets (50mg) in the evening 2020      Inactive         

 

                    metoprolol tartrate 25 mg tablet RxNorm: 819661      1 Table

t(s) Oral QAM and two 

tablets (50mg) in the evening 2020      Inactive         

 

                    Flonase Allergy Relief 50 mcg/actuation nasal spray,suspensi

on RxNorm: 4726264     2

Spray Nasal every night at bedtime 2020      Inactive     

    

 

           simvastatin 40 mg tablet RxNorm: 357035 1 Tablet(s) PO QD 2019 

Inactive                                 

 

                omeprazole 40 mg capsule,delayed release RxNorm: 075387  1 Capsu

le(s) Oral QD 

2019          Inactive             

 

                Xanax 0.25 mg tablet RxNorm: 889018  TAKE 1 TABLET BY MOUTH TWIC

E DAILY 

10/29/2019          2020          Inactive             

 

                omeprazole 40 mg capsule,delayed release RxNorm: 608964  1 Capsu

le(s) PO QD 

10/07/2019          2019          Inactive             

 

             metoprolol tartrate 25 mg tablet RxNorm: 353697 1 Tablet(s) PO BID 

2019                Inactive                   

 

                omeprazole 40 mg capsule,delayed release RxNorm: 661670  1 Capsu

le(s) PO QD 

2019          10/06/2019          Inactive             

 

                    Flonase Allergy Relief 50 mcg/actuation nasal spray,suspensi

on RxNorm: 2874754     2

[facility] NASAL QHS 2019      Inactive         

 

           simvastatin 40 mg tablet RxNorm: 731941 1 Tablet(s) PO QD 2019 

Inactive                                 

 

           simvastatin 40 mg tablet RxNorm: 127683 1 Tablet(s) PO QD 2019 

Inactive                                 

 

                omeprazole 40 mg capsule,delayed release RxNorm: 853848  1 Capsu

le(s) PO QD 

2019          Inactive             

 

           simvastatin 40 mg tablet RxNorm: 430729 1 Tablet(s) PO QD 2019 

Inactive                                 

 

                omeprazole 40 mg capsule,delayed release RxNorm: 758099  1 Capsu

le(s) PO QD 

2019          Inactive             

 

             Bactrim  mg-160 mg tablet RxNorm: 624966 1 Tablet(s) PO BID 0

3/08/2019   

2019                Inactive                   

 

             Bactrim  mg-160 mg tablet RxNorm: 372237 1 Tablet(s) PO BID 0

3/08/2019   

2019                Inactive                   

 

             Macrobid 100 mg capsule RxNorm: 460740 1 Capsule(s) PO BID 20

                          Inactive                   

 

             metoprolol tartrate 25 mg tablet RxNorm: 225682 1 Tablet(s) PO BID 

2019                Inactive                   

 

                Xanax 0.25 mg tablet RxNorm: 133455  TAKE 1 TABLET BY MOUTH TWIC

E DAILY 

2018          10/28/2019          Inactive             

 

           Xanax 0.25 mg tablet RxNorm: 643612 1 Tablet(s) PO BID 2018 

Inactive                                 

 

                    Protonix 40 mg tablet,delayed release RxNorm: 761192      1 

Tablet(s) PO BID for 

stomach--replaces omeprazole 2018      Inactive         

 

             Carafate 1 gram tablet RxNorm: 647291 1 Tablet(s) PO AC & HS 2019                Inactive                   

 

           Xanax 0.25 mg tablet RxNorm: 550629 1 Tablet(s) PO BID 10/30/2018 12/

10/2018 

Inactive                                 

 

             Bactrim  mg-160 mg tablet RxNorm: 765253 1 Tablet(s) PO BID 1

   

10/08/2018                Inactive                   

 

             Bactrim  mg-160 mg tablet RxNorm: 233237 1 Tablet(s) PO BID 1

   

10/03/2018                Inactive                   

 

             Carafate 1 gram tablet RxNorm: 140260 1 Tablet(s) PO AC & HS 09/18/

2018   

10/17/2018                Inactive                   

 

                    Protonix 40 mg tablet,delayed release RxNorm: 514869      1 

Tablet(s) PO BID for 

stomach--replaces omeprazole 2018      Inactive         

 

                    Protonix 40 mg tablet,delayed release RxNorm: 972121      1 

Tablet(s) PO BID for 

stomach--replaces omeprazole 2018      Inactive         

 

                    fluticasone 50 mcg/actuation nasal spray,suspension RxNorm: 

3305832     2 Spray 

NASAL QD to each nostril 2018      Inactive         

 

             Nasonex 50 mcg/actuation Spray RxNorm: 3809195 2 [facility] NASAL 2018                Inactive                   

 

                omeprazole 40 mg capsule,delayed release RxNorm: 320819  1 Capsu

le(s) PO QD 

2018          08/15/2018          Inactive             

 

                    simvastatin 40 mg tablet RxNorm: 007217      1 Tablet(s) PO 

QD TAKE 1 TABLET EVERY 

DAY             2018      Inactive         

 

             metoprolol tartrate 25 mg tablet RxNorm: 971698 1/2 Tablet(s) PO QD

 2018                Inactive                   

 

                simvastatin 40 mg tablet RxNorm: 381550  Tablet(s) TAKE 1 TABLET

 EVERY DAY 

2017          Inactive             

 

             metoprolol tartrate 25 mg tablet RxNorm: 597448 1/2 Tablet(s) PO QD

 2017                Inactive                   

 

                    Flonase 50 mcg/actuation nasal spray,suspension RxNorm: 1797

933     2 [facility] NASAL 

BID             2017      Inactive         

 

                omeprazole 40 mg capsule,delayed release RxNorm: 870059  1 Capsu

le(s) PO QD 

2017          Inactive             

 

             metoprolol tartrate 25 mg tablet RxNorm: 589487 1/2 Tablet(s) PO QD

 04/10/2017   

2017                Inactive                   

 

                    ciprofloxacin 0.2 % ear drops in a dropperette RxNorm: 24181

6      4 Drop(s) OTIC TID

for 1 week      2016      Inactive         

 

           cefdinir 300 mg capsule RxNorm: 448686 2 Capsule(s) PO QD 2016 

Inactive                                 

 

                    omeprazole 40 mg capsule,delayed release RxNorm: 157257     

 TAKE 1 CAPSULE EVERY DAY

                2016      Inactive         

 

             simvastatin 40 mg tablet RxNorm: 934741 TAKE 1 TABLET EVERY DAY 2017                Inactive                   

 

                    Flonase 50 mcg/actuation nasal spray,suspension RxNorm: 1797

933     2 [facility] NASAL 

BID             2015      Inactive         

 

             cefuroxime axetil 250 mg tablet RxNorm: 915254 1 Tablet(s) PO BID 0

2015                Inactive                   

 

             cefuroxime axetil 250 mg tablet RxNorm: 448384 1 Tablet(s) PO BID 0

2015                Inactive                   

 

                omeprazole 40 mg capsule,delayed release RxNorm: 139444  1 Capsu

le(s) PO QD 

2015          Inactive             

 

           meloxicam 7.5 mg tablet RxNorm: 500333 1 Tablet(s) PO QD 2015 0

2015 

Inactive                                 

 

                loratadine 10 mg tablet RxNorm: 670876  1 Tablet(s) PO QAM for a

llergies 

2014          Inactive             

 

                    Flonase 50 mcg/actuation nasal spray,suspension RxNorm: 8963

23      2 [facility] NASAL BID

                2014      12/15/2015      Inactive         

 

           prednisone 20 mg tablet RxNorm: 025157 2 Tablet(s) PO BID 2014 

Inactive                                 

 

             Dyazide 37.5 mg-25 mg capsule RxNorm: 929305 1 Capsule(s) PO QAM 2014                Inactive                   

 

           Ceftin 500 mg tablet RxNorm: 801549 1 Tablet(s) PO BID 2014 

Inactive                                 

 

           Ceftin 500 mg tablet RxNorm: 875223 1 Tablet(s) PO BID 2014 

Inactive                                 

 

                    simvastatin 40 mg tablet RxNorm: 685458      Tablet(s) PO TA

KE ONE TABLET BY MOUTH 

EVERY DAY       2014      Inactive         

 

                    metoprolol tartrate 25 mg tablet RxNorm: 860331      Tablet(

s) PO TAKE ONE-HALF 

TABLET BY MOUTH EVERY DAY 2014      Inactive         

 

           Ceftin 500 mg tablet RxNorm: 027454 1 Tablet(s) PO BID 10/15/2013 10/

 

Inactive                                 

 

                loratadine 10 mg tablet RxNorm: 816017  1 Tablet(s) PO QAM for a

llergies 

2013          Inactive             

 

                    omeprazole 20 mg capsule,delayed release RxNorm: 592529     

 Capsule(s) PO TAKE ONE 

CAPSULE BY MOUTH TWICE DAILY 2013      Inactive         

 

                omeprazole 20 mg capsule,delayed release RxNorm: 098252  1 Capsu

le(s) PO BID 

2013          Inactive             

 

             Augmentin 875 mg-125 mg tablet RxNorm: 070104 1 Tablet(s) PO Q12H 0

5/10/2013   

2013                Inactive                   

 

             Floxin Otic Drops 1 bottle Drops RxNorm:      5 Drop(s) OTIC BID 05

/10/2013   

2013                Inactive                   

 

                    metoprolol tartrate 25 mg tablet RxNorm: 024957      Tablet(

s) PO TAKE ONE-HALF 

TABLET BY MOUTH EVERY DAY 2013      Inactive         

 

                    simvastatin 40 mg tablet RxNorm: 852164      Tablet(s) PO TA

KE ONE TABLET BY MOUTH 

EVERY DAY       2013      Inactive         

 

                lancets         RxNorm:         Misc Miscellaneous USE ONE TO CH

YOGI GLUCOSE EVERY DAY 

2013          Inactive             

 

             Carafate 1 gram tablet RxNorm: 587825 1 Tablet(s) PO AC & HS 2015                Inactive                   

 

           Flagyl 500 mg tablet RxNorm: 687668 1 Tablet(s) PO TID 10/10/2012 10/

 

Inactive                                 

 

             Carafate 1 gram tablet RxNorm: 157292 1 Tablet(s) PO AC & HS 10/10/

2012   

2012                Inactive                   

 

          One Touch Test strips RxNorm:   Miscellaneous QD 2012

 Inactive  

one touch ultra mini test strips

 

           Protonix 40 mg Tab RxNorm: 895459 1 Tablet(s) PO QD 2012 

Inactive                                 

 

           Protonix 40 mg Tab RxNorm: 365887 1 Tablet(s) PO QD 2012 10/09/

2012 

Inactive                                 

 

           prednisone 20 mg Tab RxNorm: 622305 1 Tablet(s) PO BID 2012 

Inactive                                 

 

             Flexeril 5 mg Tab RxNorm: 665899 1 Tablet(s) PO QHS for spasm 2012                Inactive                   

 

             Flexeril 5 mg Tab RxNorm: 615662 1 Tablet(s) PO QHS for spasm 2012                Inactive                   

 

                amlodipine 2.5 mg Tab RxNorm: 083401  1/2 Tablet(s) PO QD replac

es 5mg dose 

2012          Inactive             

 

           simvastatin 40 mg tablet RxNorm: 262877 1 Tablet(s) PO QD 2012 

Inactive                                 

 

             metoprolol tartrate 25 mg tablet RxNorm: 429875 1/2 Tablet(s) PO QD

 2012                Inactive                   

 

                omeprazole 20 mg capsule,delayed release RxNorm: 330821  1 Capsu

le(s) PO BID 

2012          Inactive             

 

           cefdinir 300 mg Cap RxNorm: 777606 2 Capsule(s) PO QD 2011 

Inactive                                 

 

           simvastatin 40 mg Tab RxNorm: 242117 1 Tablet(s) PO QD 2011 

Inactive                                 

 

             metoprolol tartrate 25 mg Tab RxNorm: 046848 1/2 Tablet(s) PO QD 10

/   

2012                Inactive                   

 

             metoprolol tartrate 25 mg Tab RxNorm: 470440 1/2 Tablet(s) PO QD 10

/   

10/24/2011                Inactive                   

 

           meclizine 25 mg Tab RxNorm: 4989206 1 Tablet(s) PO QHS 2011 

Inactive                                for dizziness

 

           Ceftin 500 mg Tab RxNorm: 767626 1 Tablet(s) PO BID 2011 

Inactive                                 

 

                omeprazole 20 mg Cap, Delayed Release RxNorm: 450370  1 Capsule(

s) PO BID 

2011          10/24/2011          Inactive             

 

           simvastatin 40 mg Tab RxNorm: 077605 1 Tablet(s) PO QD 2011 

Inactive                                 

 

           simvastatin 40 mg Tab RxNorm: 580644 1 Tablet(s) PO QD 2011 

Inactive                                 

 

           simvastatin 40 mg Tab RxNorm: 009252 1 Tablet(s) PO QD 2010 

Inactive                                 

 

             Tessalon Perles 100 mg Cap RxNorm: 817301 1 Capsule(s) PO Q6-8H 2010                Inactive                   

 

           cefdinir 300 mg Cap RxNorm: 604286 1 Capsule(s) PO BID 2010 

Inactive                                 

 

           Lexapro 10 mg Tab RxNorm: 805504 1 Tablet(s) PO QD 2010

010 

Inactive                                 

 

           Cipro 250 mg Tab RxNorm: 700282 1 Tablet(s) PO BID 2010 10/04/2

010 

Inactive                                 

 

             Wellbutrin  mg 24 hr Tab RxNorm: 260905 1 Tablet(s) PO QAM 2010                Inactive                   

 

          Fish Oil 1,000 mg Cap RxNorm:   1 Capsule(s) PO QD No Start Date      

     Active     

 

                Tylenol Extra Strength 500 mg tablet RxNorm: 586149  1/2 Tablet(

s) PO as needed 

No Start Date                           Active               

 

                nitroglycerin 0.4 mg sublingual tablet RxNorm: 941817  Tablet(s)

 SL as needed No 

Start Date                              Active               

 

                    Calcium with Vitamin D 600 mg (1,500 mg)-400 unit tablet RxN

orm: 374840      1 

Tablet(s) PO QD No Start Date                   Active           

 

           Multivitamin & Mineral Formula Tab RxNorm:    1 Tablet(s) PO QD No St

art Date            

Active                                   

 

          vitamin B complex capsule RxNorm:   1 Capsule(s) PO QD No Start Date  

         Active     

 

          Aspirin 81 mg Tab RxNorm: 046023 1 Tablet(s) PO QD No Start Date      

     Active     

 

           Vitamin D 1,000 unit Cap RxNorm: 387043 1 Capsule(s) PO QD No Start D

ate            

Active                                   

 

          Co Q-10 oral RxNorm: 28508 oral      No Start Date           Active   

  

 

             metoprolol tartrate 25 mg Tab RxNorm: 486212 1/2 Tablet(s) PO QD No

 Start Date 

10/23/2011                Inactive                   

 

             Nasonex 50 mcg/actuation Spray RxNorm: 5567251 2 Spray NASAL No Sta

rt Date 

2018                Inactive                   

 

           Vitamin B12 1000mcg Tablet RxNorm:    1 Tablet(s) PO QD No Start Date

 2015 

Inactive                                 

 

           amlodipine 5 mg Tab RxNorm: 298451 1 Tablet(s) PO QD No Start Date  

Inactive                                 

 

             simvastatin 40 mg tablet RxNorm: 835434 1 Tablet(s) PO QD No Start 

Date 

2019                Inactive                   

 

                omeprazole 20 mg capsule,delayed release RxNorm: 216193  1 Capsu

le(s) PO BID No 

Start Date          2013          Inactive             

 

                omeprazole 40 mg capsule,delayed release RxNorm: 279958  1 Capsu

le(s) PO QD No 

Start Date          2019          Inactive             

 

           Nexium 40 mg Cap RxNorm: 844720 1 Capsule(s) PO QD No Start Date  

Inactive                                 

 

             Stool Softener 100 mg Tab RxNorm: 8542092 2 Tablet(s) PO BID No Sta

rt Date 

2012                Inactive                   

 

                    Calcium with Vitamin D 600 mg (1,500 mg)-400 unit Tab RxNorm

: 044532      1 Tablet(s)

PO QD           No Start Date   2015      Inactive         

 

             iron 325 mg (65 mg iron) Tab RxNorm: 701216 1 Tablet(s) PO QD No St

art Date 

2015                Inactive                   

 

                Flonase 50 mcg/actuation Nasal Spray RxNorm: 610049  2 Rudolph ROZ

AL BID No Start 

Date                2014          Inactive             

 

                    fluticasone 50 mcg/actuation nasal spray,suspension RxNorm: 

0689774     2 Spray 

NASAL QD to each nostril No Start Date   2018      Inactive         

 

             Nasonex 50 mcg/actuation Spray RxNorm: 2115117 2 Spray NASAL QD No 

Start Date 

2018                Inactive                   

 

                    hydralazine 50 mg tablet RxNorm: 468849      1 Tablet(s) PO 

as needed for BP over 

160/90          No Start Date   2018      Inactive         

 

             Vimovo 500 mg-20 mg 12 hr Tab RxNorm: 923987 1 Tablet(s) PO BID No 

Start Date 

2011                Inactive                   

 

             Tylenol PM 25 mg-500 mg/15 mL Oral Soln RxNorm: 2230523 1 PO QPM   

  No Start Date 

2015                Inactive                   

 

          Iron (Ferrous Sulfate) Oral RxNorm:   Oral      No Start Date 20

12 Inactive   

 

             Reglan 10 mg Tab RxNorm: 769428 1 Tablet(s) PO TID before meals No 

Start Date 

2011                Inactive                   

 

           Xanax 1 mg Tab RxNorm: 556240 1/2 Tablet(s) PO QD No Start Date  

Inactive                                 

 

             amlodipine 10 mg tablet RxNorm: 211976 1 Tablet(s) PO QD No Start D

ate 

2014                Inactive                   

 

          Iron (dried) Oral RxNorm:   Oral      No Start Date 2012 Inactiv

e   

 

                metoprolol tartrate 50 mg tablet RxNorm: 272422  1 Tablet(s) PO 

BID No Start Date

                    12/10/2018          Inactive             

 

           Xanax 0.25 mg tablet RxNorm: 636131 1 Tablet(s) PO BID No Start Date 

10/29/2018 

Inactive                                 

 

                lancets         RxNorm:         Miscellaneous check blood sugar 

at least once daily No Start 

Date                2013          Inactive             

 

           simvastatin 40 mg Tab RxNorm: 510759 1 Tablet(s) PO QD No Start Date 

2010 

Inactive                                 

 

                    isosorbide mononitrate ER 30 mg tablet,extended release 24 h

r RxNorm: 408813      1 

Tablet(s) PO QHS No Start Date   2019      Inactive         

 

             amlodipine 2.5 mg tablet RxNorm: 263617 1 Tablet(s) PO QD No Start 

Date 

2014                Inactive                   

 

                    isosorbide mononitrate ER 30 mg tablet,extended release 24 h

r RxNorm: 973945      1 

Tablet(s) PO QHS No Start Date   2020      Inactive         

 

             sucralfate 1 gram tablet RxNorm: 348219 1 Tablet(s) PO QID No Start

 Date 

2019                Inactive                   

 

          Fish Oil Oral RxNorm:   Oral      No Start Date 2012 Inactive   

 

          Multiple Vitamin Oral RxNorm:   Oral      No Start Date 2012 Chloride

ctive   

 

                metoprolol tartrate 25 mg tablet RxNorm: 795891  1/2 Tablet(s) P

O QD No Start 

Date                2017          Inactive             

 

                metoprolol tartrate 50 mg tablet RxNorm: 182244  1/2 Tablet(s) P

O BID No Start 

Date                2019          Inactive             

 

                    Flonase Allergy Relief 50 mcg/actuation nasal spray,suspensi

on RxNorm: 1097866     2

Rudolph NASAL QHS No Start Date   2019      Inactive         







Medication Administered

No Medication Administered data



Immunizations





                Vaccine         Codes           Date            Status

 

                Influenza       CVX: 135        10/22/2019      Complete

 

                Influenza       CVX: 135        10/22/2019      Complete

 

                Influenza       CVX: 135        2018      Complete

 

                Influenza       CVX: 135        10/06/2017      Complete

 

                Influenza       CVX: 135        10/07/2016      Complete

 

                Influenza       CVX: 135        10/16/2015       

 

                Influenza       CVX: 141        2013       

 

                Influenza (Adult) CVX: 141        10/06/2010       







Results





          Observation Observation Code Item      Item Code Result    Date      S

[facility]

 

          COMPLETE BLOOD COUNT [id number]   WBC                 7.1 10e9/L 20

20 Unknown

 

          COMPLETE BLOOD COUNT [id number]   RBC                 4.16 10e12/L 2020 Unknown

 

          COMPLETE BLOOD COUNT [id number]   HEMOGLOBIN           12.4 g/dL 20 Unknown

 

          COMPLETE BLOOD COUNT [id number]   HEMATOCRIT           39.3 %    20

20 Unknown

 

          COMPLETE BLOOD COUNT [id number]   MCV                 94.5 fL   

0 Unknown

 

          COMPLETE BLOOD COUNT [id number]   MCH                 29.8 pg   

0 Unknown

 

          COMPLETE BLOOD COUNT [id number]   MCHC                31.6 g/dL 

0 Unknown

 

          COMPLETE BLOOD COUNT [id number]   PLATELET COUNT           161 10e9/L 2020 Unknown

 

          COMPLETE BLOOD COUNT [id number]   Mean Plt Volume           10.8 fL   2020 Unknown

 

          COMPLETE BLOOD COUNT [id number]   Neut Auto           38.7 %    

0 Unknown

 

          COMPLETE BLOOD COUNT [id number]   Lymph Auto           48.0 %    20 Unknown

 

          COMPLETE BLOOD COUNT [id number]   Mono Auto           9.5 %     

0 Unknown

 

          COMPLETE BLOOD COUNT [id number]   RDW                 13.5 %    

0 Unknown

 

          COMPLETE BLOOD COUNT [id number]   Eos Auto            3.2 %     

0 Unknown

 

          COMPLETE BLOOD COUNT [id number]   Baso Auto           0.6 %     

0 Unknown

 

          COMPLETE BLOOD COUNT [id number]   Neutrophil Abs           2.75 10e9/L  Unknown

 

          COMPLETE BLOOD COUNT [id number]   Lymphocyte Abs           3.41 10e9/L  Unknown

 

          COMPLETE BLOOD COUNT [id number]   Monocyte Abs           0.67 10e9/L 2020 Unknown

 

          COMPLETE BLOOD COUNT [id number]   Eosinophil Abs           0.23 10e9/L  Unknown

 

          COMPLETE BLOOD COUNT [id number]   RDW-SD              44.7 fL   

0 Unknown

 

          COMPLETE BLOOD COUNT [id number]   Basophil Abs           0.04 10e9/L 2020 Unknown

 

          THYROID STIMULATING HORMONE 69661     TSH                 1.677 uIU/mL

 2020 Unknown

 

          COMPREHENSIVE METABOLIC 57116     AST                 19 U/L    2020 Unknown

 

          COMPREHENSIVE METABOLIC 15831     ALT                 12 U/L    2020 Unknown

 

          COMPREHENSIVE METABOLIC 15093     BUN                 17 mg/dL  2020 Unknown

 

          COMPREHENSIVE METABOLIC 30164     ALBUMIN             4.2 g/dL  2020 Unknown

 

          COMPREHENSIVE METABOLIC 03166     CHLORIDE            103 mmol/L  Unknown

 

          COMPREHENSIVE METABOLIC 05242     Bili Total           0.2 mg/dL  Unknown

 

          COMPREHENSIVE METABOLIC 55822     ALK PHOS            61 U/L    2020 Unknown

 

          COMPREHENSIVE METABOLIC 08759     SODIUM              140 mmol/L  Unknown

 

          COMPREHENSIVE METABOLIC 18975     CREATININE           0.95 mg/dL 2020 Unknown

 

          COMPREHENSIVE METABOLIC 39752     CALCIUM             9.4 mg/dL 2020 Unknown

 

          COMPREHENSIVE METABOLIC 62076     POTASSIUM           4.2 mmol/L  Unknown

 

          COMPREHENSIVE METABOLIC 24633     Total Protein           6.5 g/dL   Unknown

 

          COMPREHENSIVE METABOLIC 67116     Glucose             103 mg/dL 2020 Unknown

 

          COMPREHENSIVE METABOLIC 45389     Bicarbonate           26 mmol/L 2020 Unknown

 

          COMPREHENSIVE METABOLIC 54315     AGAP                11 mmol/L 2020 Unknown

 

          GFR CALC  3333667   GFR Non Afr Amr           57 mL/min 2020 Unk

nown

 

          GFR CALC  8515958   GFR Afr Amr           >60 mL/min 2020 Unknow

n

 

          GFR CALC  4363384   GFR Non Afr Amr           52 mL/min 10/11/2019 Unk

nown

 

          GFR CALC  6960935   GFR Afr Amr           >60 mL/min 10/11/2019 Unknow

n

 

          COMPREHENSIVE METABOLIC 90077     AST                 17 U/L    10/11/

2019 Unknown

 

          COMPREHENSIVE METABOLIC 01512     ALT                 11 U/L    10/11/

2019 Unknown

 

          COMPREHENSIVE METABOLIC 46764     BUN                 17 mg/dL  10/11/

2019 Unknown

 

          COMPREHENSIVE METABOLIC 24133     ALBUMIN             3.9 g/dL  10/11/

2019 Unknown

 

          COMPREHENSIVE METABOLIC 36780     CHLORIDE            108 mmol/L 10/11

/2019 Unknown

 

          COMPREHENSIVE METABOLIC 47503     Bili Total           0.5 mg/dL 10/11

/2019 Unknown

 

          COMPREHENSIVE METABOLIC 38026     ALK PHOS            72 U/L    10/11/

2019 Unknown

 

          COMPREHENSIVE METABOLIC 01118     SODIUM              142 mmol/L 10/11

/2019 Unknown

 

          COMPREHENSIVE METABOLIC 04039     CREATININE           1.02 mg/dL 10/1

2019 Unknown

 

          COMPREHENSIVE METABOLIC 38416     CALCIUM             9.3 mg/dL 10/11/

2019 Unknown

 

          COMPREHENSIVE METABOLIC 87554     POTASSIUM           4.0 mmol/L 10/11

/2019 Unknown

 

          COMPREHENSIVE METABOLIC 03148     Total Protein           6.2 g/dL  10

/2019 Unknown

 

          COMPREHENSIVE METABOLIC 45797     Glucose             93 mg/dL  10/11/

2019 Unknown

 

          COMPREHENSIVE METABOLIC 32494     Bicarbonate           27 mmol/L 10/1

2019 Unknown

 

          COMPREHENSIVE METABOLIC 02032     AGAP                7 mmol/L  10/11/

2019 Unknown

 

          GFR CALC  3966281   GFR Non Afr Amr           48 mL/min 06/10/2019 Unk

nown

 

          GFR CALC  3710277   GFR Afr Amr           59 mL/min 06/10/2019 Unknown

 

          THYROID STIMULATING HORMONE 83342     TSH                 1.936 uIU/mL

 06/10/2019 Unknown

 

          COMPREHENSIVE METABOLIC 30021     AST                 18 U/L    06/10/

2019 Unknown

 

          COMPREHENSIVE METABOLIC 13037     ALT                 11 U/L    06/10/

2019 Unknown

 

          COMPREHENSIVE METABOLIC 36413     BUN                 18 mg/dL  06/10/

2019 Unknown

 

          COMPREHENSIVE METABOLIC 45182     ALBUMIN             4.2 g/dL  06/10/

2019 Unknown

 

          COMPREHENSIVE METABOLIC 52782     CHLORIDE            109 mmol/L 06/10

/2019 Unknown

 

          COMPREHENSIVE METABOLIC 05201     Bili Total           0.4 mg/dL 06/10

/2019 Unknown

 

          COMPREHENSIVE METABOLIC 82602     ALK PHOS            64 U/L    06/10/

2019 Unknown

 

          COMPREHENSIVE METABOLIC 03245     SODIUM              142 mmol/L 06/10

/2019 Unknown

 

          COMPREHENSIVE METABOLIC 89688     CREATININE           1.09 mg/dL  Unknown

 

          COMPREHENSIVE METABOLIC 89977     CALCIUM             9.3 mg/dL 06/10/

2019 Unknown

 

          COMPREHENSIVE METABOLIC 71348     POTASSIUM           4.7 mmol/L 06/10

/2019 Unknown

 

          COMPREHENSIVE METABOLIC 04021     Total Protein           6.3 g/dL  06

/10/2019 Unknown

 

          COMPREHENSIVE METABOLIC 61565     Glucose             84 mg/dL  06/10/

2019 Unknown

 

          COMPREHENSIVE METABOLIC 60571     Bicarbonate           25 mmol/L  Unknown

 

          COMPREHENSIVE METABOLIC 46830     AGAP                8 mmol/L  06/10/

2019 Unknown

 

          COMPLETE BLOOD COUNT [id number]   WBC                 7.8 10e9/L 06/10/20

19 Unknown

 

          COMPLETE BLOOD COUNT [id number]   RBC                 4.04 10e12/L 06/10/

2019 Unknown

 

          COMPLETE BLOOD COUNT [id number]   HEMOGLOBIN           12.2 g/dL 06/10/20

19 Unknown

 

          COMPLETE BLOOD COUNT [id number]   HEMATOCRIT           38.6 %    06/10/20

19 Unknown

 

          COMPLETE BLOOD COUNT [id number]   MCV                 95.5 fL   06/10/201

9 Unknown

 

          COMPLETE BLOOD COUNT [id number]   MCH                 30.2 pg   06/10/201

9 Unknown

 

          COMPLETE BLOOD COUNT [id number]   MCHC                31.6 g/dL 06/10/201

9 Unknown

 

          COMPLETE BLOOD COUNT [id number]   PLATELET COUNT           215 10e9/L 06/

10/2019 Unknown

 

          COMPLETE BLOOD COUNT [id number]   Mean Plt Volume           11.2 fL   06/

10/2019 Unknown

 

          COMPLETE BLOOD COUNT [id number]   Neut Auto           45.7 %    06/10/201

9 Unknown

 

          COMPLETE BLOOD COUNT [id number]   Lymph Auto           42.1 %    06/10/20

19 Unknown

 

          COMPLETE BLOOD COUNT [id number]   Mono Auto           9.2 %     06/10/201

9 Unknown

 

          COMPLETE BLOOD COUNT [id number]   RDW                 13.4 %    06/10/201

9 Unknown

 

          COMPLETE BLOOD COUNT [id number]   Eos Auto            2.7 %     06/10/201

9 Unknown

 

          COMPLETE BLOOD COUNT [id number]   Baso Auto           0.3 %     06/10/201

9 Unknown

 

          COMPLETE BLOOD COUNT [id number]   Neutrophil Abs           3.56 10e9/L 06

/10/2019 Unknown

 

          COMPLETE BLOOD COUNT [id number]   Lymphocyte Abs           3.28 10e9/L 06

/10/2019 Unknown

 

          COMPLETE BLOOD COUNT [id number]   Monocyte Abs           0.72 10e9/L  Unknown

 

          COMPLETE BLOOD COUNT [id number]   Eosinophil Abs           0.21 10e9/L 06

/10/2019 Unknown

 

          COMPLETE BLOOD COUNT [id number]   RDW-SD              44.9 fL   06/10/201

9 Unknown

 

          COMPLETE BLOOD COUNT [id number]   Basophil Abs           0.02 10e9/L  Unknown

 

          COMPLETE BLOOD COUNT [id number]   WBC                 5.2 10e9/L 20

18 Unknown

 

          COMPLETE BLOOD COUNT [id number]   RBC                 4.21 10e12/L 2018 Unknown

 

          COMPLETE BLOOD COUNT [id number]   HEMOGLOBIN           12.8 g/dL 20

18 Unknown

 

          COMPLETE BLOOD COUNT [id number]   HEMATOCRIT           39.0 %    20

18 Unknown

 

          COMPLETE BLOOD COUNT [id number]   MCV                 92.6 fL   

8 Unknown

 

          COMPLETE BLOOD COUNT [id number]   MCH                 30.4 pg   

8 Unknown

 

          COMPLETE BLOOD COUNT [id number]   MCHC                32.8 g/dL 

8 Unknown

 

          COMPLETE BLOOD COUNT [id number]   PLATELET COUNT           202 10e9/L  Unknown

 

          COMPLETE BLOOD COUNT [id number]   Mean Plt Volume           10.7 fL    Unknown

 

          COMPLETE BLOOD COUNT [id number]   Neut Auto           40.9 %    

8 Unknown

 

          COMPLETE BLOOD COUNT [id number]   Lymph Auto           46.3 %    20

18 Unknown

 

          COMPLETE BLOOD COUNT [id number]   Mono Auto           9.3 %     

8 Unknown

 

          COMPLETE BLOOD COUNT [id number]   RDW                 13.7 %    

8 Unknown

 

          COMPLETE BLOOD COUNT [id number]   Eos Auto            2.9 %     

8 Unknown

 

          COMPLETE BLOOD COUNT [id number]   Baso Auto           0.6 %     

8 Unknown

 

          COMPLETE BLOOD COUNT [id number]   Neutrophil Abs           2.13 10e9/L  Unknown

 

          COMPLETE BLOOD COUNT [id number]   Lymphocyte Abs           2.41 10e9/L  Unknown

 

          COMPLETE BLOOD COUNT [id number]   Monocyte Abs           0.48 10e9/L  Unknown

 

          COMPLETE BLOOD COUNT [id number]   Eosinophil Abs           0.15 10e9/L  Unknown

 

          COMPLETE BLOOD COUNT [id number]   RDW-SD              45.2 fL   

8 Unknown

 

          COMPLETE BLOOD COUNT [id number]   Basophil Abs           0.03 10e9/L  Unknown

 

          [place] [address]     Glucose             118 mg/dL  Unknown

 

          [place] [address]     CREATININE           1.01 mg/dL  Unknown

 

          [place] [address]     BUN                 14 mg/dL   Unknown

 

          [place] [address]     SODIUM              141 mmol/L  Unknown

 

          [place] [address]     POTASSIUM           4.0 mmol/L  Unknown

 

          [place] [address]     CHLORIDE            108 mmol/L  Unknown

 

          [place] [address]     Bicarbonate           25 mmol/L  Unknown

 

          [place] [address]     AGAP                8 mmol/L   Unknown

 

          [place] [address]     CALCIUM             9.6 mg/dL  Unknown

 

          FREE T4   59298     T4 Free             1.23 ng/dL 2018 Unknown

 

          GFR CALC  3691726   GFR Non Afr Amr           53 mL/min 2018 Unk

nown

 

          GFR CALC  0250170   GFR Afr Amr           >60 mL/min 2018 Unknow

n

 

          THYROID STIMULATING HORMONE 02713     TSH                 2.124 uIU/mL

 2018 Unknown

 

          LIPID GROUP 06534     Cholesterol           152 mg/dL 2018 Unkno

wn

 

          LIPID GROUP 70148     Triglyceride           151 mg/dL 2018 Unkn

own

 

          LIPID GROUP 70565     HDL CHOLESTEROL           47 mg/dL  2018 U

nknown

 

          LIPID GROUP 94323     Chol/HDL Ratio           3.23 ratio 2018 U

nknown

 

          LIPID GROUP 49657     NON-HDL Chol           105 mg/dL 2018 Unkn

own

 

          LIPID GROUP 99809     LDL Cholesterol           75 mg/dL  2018 U

nknown

 

          ASSAY OF TROPONIN QUANT 89734     Troponin-I           <0.30 ng/mL  Unknown

 

          COMPREHENSIVE METABOLIC 93869     AST                 20 U/L    2018 Unknown

 

          COMPREHENSIVE METABOLIC 45578     ALT                 14 U/L    2018 Unknown

 

          COMPREHENSIVE METABOLIC 35409     BUN                 19 mg/dL  2018 Unknown

 

          COMPREHENSIVE METABOLIC 53951     ALBUMIN             4.2 g/dL  2018 Unknown

 

          COMPREHENSIVE METABOLIC 20927     CHLORIDE            102 mmol/L  Unknown

 

          COMPREHENSIVE METABOLIC 11264     Bili Total           0.4 mg/dL  Unknown

 

          COMPREHENSIVE METABOLIC 82216     ALK PHOS            66 U/L    2018 Unknown

 

          COMPREHENSIVE METABOLIC 59429     SODIUM              135 mmol/L  Unknown

 

          COMPREHENSIVE METABOLIC 62972     CREATININE           1.01 mg/dL  Unknown

 

          COMPREHENSIVE METABOLIC 21248     CALCIUM             9.3 mg/dL 2018 Unknown

 

          COMPREHENSIVE METABOLIC 97257     POTASSIUM           4.8 mmol/L  Unknown

 

          COMPREHENSIVE METABOLIC 95353     Total Protein           7.0 g/dL   Unknown

 

          COMPREHENSIVE METABOLIC 96674     Glucose             91 mg/dL  2018 Unknown

 

          COMPREHENSIVE METABOLIC 20205     Bicarbonate           23 mmol/L  Unknown

 

          COMPREHENSIVE METABOLIC 26965     AGAP                10 mmol/L 2018 Unknown

 

          COMPLETE BLOOD COUNT [id number]   WBC                 7.5 10e9/L 20

18 Unknown

 

          COMPLETE BLOOD COUNT [id number]   RBC                 4.13 10e12/L 2018 Unknown

 

          COMPLETE BLOOD COUNT [id number]   HEMOGLOBIN           12.6 g/dL 20

18 Unknown

 

          COMPLETE BLOOD COUNT [id number]   HEMATOCRIT           38.4 %    20

18 Unknown

 

          COMPLETE BLOOD COUNT [id number]   MCV                 93.0 fL   

8 Unknown

 

          COMPLETE BLOOD COUNT [id number]   MCH                 30.5 pg   

8 Unknown

 

          COMPLETE BLOOD COUNT [id number]   MCHC                32.8 g/dL 

8 Unknown

 

          COMPLETE BLOOD COUNT [id number]   PLATELET COUNT           204 10e9/L  Unknown

 

          COMPLETE BLOOD COUNT [id number]   Mean Plt Volume           10.9 fL    Unknown

 

          COMPLETE BLOOD COUNT [id number]   Neut Auto           43.1 %    

8 Unknown

 

          COMPLETE BLOOD COUNT [id number]   Lymph Auto           45.0 %    20

18 Unknown

 

          COMPLETE BLOOD COUNT [id number]   Mono Auto           9.2 %     

8 Unknown

 

          COMPLETE BLOOD COUNT [id number]   RDW                 13.4 %    

8 Unknown

 

          COMPLETE BLOOD COUNT [id number]   Eos Auto            2.3 %     

8 Unknown

 

          COMPLETE BLOOD COUNT [id number]   Baso Auto           0.4 %     

8 Unknown

 

          COMPLETE BLOOD COUNT [id number]   Neutrophil Abs           3.23 10e9/L  Unknown

 

          COMPLETE BLOOD COUNT [id number]   Lymphocyte Abs           3.38 10e9/L  Unknown

 

          COMPLETE BLOOD COUNT [id number]   Monocyte Abs           0.69 10e9/L  Unknown

 

          COMPLETE BLOOD COUNT [id number]   Eosinophil Abs           0.17 10e9/L  Unknown

 

          COMPLETE BLOOD COUNT [id number]   RDW-SD              44.4 fL   

8 Unknown

 

          COMPLETE BLOOD COUNT [id number]   Basophil Abs           0.03 10e9/L  Unknown

 

          GFR CALC  4938393   GFR Non Afr Amr           53 mL/min 2018 Unk

nown

 

          GFR CALC  0866130   GFR Afr Amr           >60 mL/min 2018 Unknow

n

 

          GLYCOSYLATED HEMOGLOBIN TEST 48577     Hgb A1c   24892-1   5.4 %     0

2018 Unknown

 

          MEAN GLUC 8175873   Calc Mean Gluc           108 mg/dL 2018 Unkn

own

 

          MEAN GLUC 2531920   Calc Mean Gluc           114 mg/dL 2017 Unkn

own

 

          LIPID GROUP 04429     Cholesterol           146 mg/dL 2017 Unkno

wn

 

          LIPID GROUP 71736     Triglyceride           119 mg/dL 2017 Unkn

own

 

          LIPID GROUP 68158     HDL CHOLESTEROL           47 mg/dL  2017 U

nknown

 

          LIPID GROUP 75928     Chol/HDL Ratio           3.11 ratio 2017 U

nknown

 

          LIPID GROUP 17000     NON-HDL Chol           99 mg/dL  2017 Unkn

own

 

          LIPID GROUP 61885     LDL Cholesterol           75 mg/dL  2017 U

nknown

 

          GLYCOSYLATED HEMOGLOBIN TEST 76298     Hgb A1c   06172-8   5.6 %     0

2017 Unknown

 

          COMPREHENSIVE METABOLIC 31011     AST                 22 U/L    2017 Unknown

 

          COMPREHENSIVE METABOLIC 49517     ALT                 12 U/L    2017 Unknown

 

          COMPREHENSIVE METABOLIC 64158     BUN                 17 mg/dL  2017 Unknown

 

          COMPREHENSIVE METABOLIC 37553     ALBUMIN             4.0 g/dL  2017 Unknown

 

          COMPREHENSIVE METABOLIC 05266     CHLORIDE            110 mmol/L  Unknown

 

          COMPREHENSIVE METABOLIC 79977     Bili Total           0.4 mg/dL  Unknown

 

          COMPREHENSIVE METABOLIC 54150     ALK PHOS            63 U/L    2017 Unknown

 

          COMPREHENSIVE METABOLIC 82041     SODIUM              140 mmol/L  Unknown

 

          COMPREHENSIVE METABOLIC 82179     CREATININE           1.05 mg/dL  Unknown

 

          COMPREHENSIVE METABOLIC 49259     CALCIUM             9.2 mg/dL 2017 Unknown

 

          COMPREHENSIVE METABOLIC 77852     POTASSIUM           4.2 mmol/L  Unknown

 

          COMPREHENSIVE METABOLIC 15040     Total Protein           6.2 g/dL   Unknown

 

          COMPREHENSIVE METABOLIC 34664     Glucose             87 mg/dL  2017 Unknown

 

          COMPREHENSIVE METABOLIC 08958     Bicarbonate           24 mmol/L  Unknown

 

          COMPREHENSIVE METABOLIC 71048     AGAP                6 mmol/L  2017 Unknown

 

          GFR CALC  7983352   GFR Non Afr Amr           51 mL/min 2017 Unk

nown

 

          GFR CALC  9742315   GFR Afr Amr           >60 mL/min 2017 Unknow

n

 

          COMPLETE BLOOD COUNT 9842711   WBC                 6.7 10e9/L 20

17 Unknown

 

          COMPLETE BLOOD COUNT [id number]   RBC                 4.04 10e12/L 2017 Unknown

 

          COMPLETE BLOOD COUNT 9952964   HEMOGLOBIN           12.1 g/dL 20

17 Unknown

 

          COMPLETE BLOOD COUNT [id number]   HEMATOCRIT           38.0 %    20

17 Unknown

 

          COMPLETE BLOOD COUNT 1976901   MCV                 94.1 fL   

7 Unknown

 

          COMPLETE BLOOD COUNT [id number]   MCH                 30.0 pg   

7 Unknown

 

          COMPLETE BLOOD COUNT [id number]   MCHC                31.8 g/dL 

7 Unknown

 

          COMPLETE BLOOD COUNT [id number]   PLATELET COUNT           206 10e9/L  Unknown

 

          COMPLETE BLOOD COUNT [id number]   Mean Plt Volume           11.3 fL    Unknown

 

          COMPLETE BLOOD COUNT [id number]   Neut Auto           35.8 %    

7 Unknown

 

          COMPLETE BLOOD COUNT [id number]   Lymph Auto           51.6 %    20

17 Unknown

 

          COMPLETE BLOOD COUNT [id number]   Mono Auto           8.8 %     

7 Unknown

 

          COMPLETE BLOOD COUNT [id number]   RDW                 13.5 %    

7 Unknown

 

          COMPLETE BLOOD COUNT [id number]   Eos Auto            3.4 %     

7 Unknown

 

          COMPLETE BLOOD COUNT 8290156   Baso Auto           0.4 %     

7 Unknown

 

          COMPLETE BLOOD COUNT [id number]   Neutrophil Abs           2.40 10e9/L  Unknown

 

          COMPLETE BLOOD COUNT [id number]   Lymphocyte Abs           3.46 10e9/L  Unknown

 

          COMPLETE BLOOD COUNT [id number]   Monocyte Abs           0.59 10e9/L  Unknown

 

          COMPLETE BLOOD COUNT [id number]   Eosinophil Abs           0.23 10e9/L  Unknown

 

          COMPLETE BLOOD COUNT [id number]   RDW-SD              45.3 fL   

7 Unknown

 

          COMPLETE BLOOD COUNT [id number]   Basophil Abs           0.03 10e9/L  Unknown

 

          THYROID STIMULATING HORMONE 24957     TSH                 1.981 uIU/mL

 2017 Unknown

 

          COMPLETE BLOOD COUNT [id number]   WBC                 6.0 10e9/L 20

17 Unknown

 

          COMPLETE BLOOD COUNT [id number]   RBC                 4.29 10e12/L 2017 Unknown

 

          COMPLETE BLOOD COUNT [id number]   HEMOGLOBIN           12.9 g/dL 20

17 Unknown

 

          COMPLETE BLOOD COUNT [id number]   HEMATOCRIT           38.4 %    20

17 Unknown

 

          COMPLETE BLOOD COUNT [id number]   MCV                 89.5 fL   

7 Unknown

 

          COMPLETE BLOOD COUNT [id number]   MCH                 30.1 pg   

7 Unknown

 

          COMPLETE BLOOD COUNT [id number]   MCHC                33.6 g/dL 

7 Unknown

 

          COMPLETE BLOOD COUNT [id number]   PLATELET COUNT           181 10e9/L 2017 Unknown

 

          COMPLETE BLOOD COUNT [id number]   Mean Plt Volume           11.7 fL   2017 Unknown

 

          COMPLETE BLOOD COUNT [id number]   Neut Auto           36.9 %    

7 Unknown

 

          COMPLETE BLOOD COUNT 6273603   Lymph Auto           50.4 %    20

17 Unknown

 

          COMPLETE BLOOD COUNT [id number]   Mono Auto           9.0 %     

7 Unknown

 

          COMPLETE BLOOD COUNT [id number]   RDW                 13.7 %    

7 Unknown

 

          COMPLETE BLOOD COUNT [id number]   Eos Auto            3.4 %     

7 Unknown

 

          COMPLETE BLOOD COUNT [id number]   Baso Auto           0.3 %     

7 Unknown

 

          COMPLETE BLOOD COUNT [id number]   Neutrophil Abs           2.21 10e9/L  Unknown

 

          COMPLETE BLOOD COUNT [id number]   Lymphocyte Abs           3.02 10e9/L  Unknown

 

          COMPLETE BLOOD COUNT [id number]   Monocyte Abs           0.54 10e9/L 2017 Unknown

 

          COMPLETE BLOOD COUNT [id number]   Eosinophil Abs           0.20 10e9/L  Unknown

 

          COMPLETE BLOOD COUNT [id number]   RDW-SD              44.0 fL   

7 Unknown

 

          COMPLETE BLOOD COUNT [id number]   Basophil Abs           0.02 10e9/L 2017 Unknown

 

          GLYCOSYLATED HEMOGLOBIN TEST 14987     Hgb A1c   01574-6   5.4 %     0

3/09/2017 Unknown

 

          THYROID STIMULATING HORMONE 77776     TSH                 2.200 uIU/mL

 2017 Unknown

 

          GFR CALC  2043759   GFR Non Afr Amr           50 mL/min 2017 Unk

nown

 

          GFR CALC  3797216   GFR Afr Amr           >60 mL/min 2017 Unknow

n

 

          MEAN GLUC 5181442   Calc Mean Gluc           108 mg/dL 2017 Unkn

own

 

          COMPREHENSIVE METABOLIC 32037     AST                 18 U/L    2017 Unknown

 

          COMPREHENSIVE METABOLIC 97326     ALT                 10 U/L    2017 Unknown

 

          COMPREHENSIVE METABOLIC 43107     BUN                 20 mg/dL  2017 Unknown

 

          COMPREHENSIVE METABOLIC 43770     ALBUMIN             4.1 g/dL  2017 Unknown

 

          COMPREHENSIVE METABOLIC 95890     CHLORIDE            109 mmol/L  Unknown

 

          COMPREHENSIVE METABOLIC 83215     Bili Total           0.6 mg/dL  Unknown

 

          COMPREHENSIVE METABOLIC 12997     ALK PHOS            64 U/L    2017 Unknown

 

          COMPREHENSIVE METABOLIC 17885     SODIUM              141 mmol/L  Unknown

 

          COMPREHENSIVE METABOLIC 48983     CREATININE           1.06 mg/dL 2017 Unknown

 

          COMPREHENSIVE METABOLIC 23380     CALCIUM             9.9 mg/dL 2017 Unknown

 

          COMPREHENSIVE METABOLIC 46638     POTASSIUM           4.2 mmol/L  Unknown

 

          COMPREHENSIVE METABOLIC 37336     Total Protein           6.3 g/dL   Unknown

 

          COMPREHENSIVE METABOLIC 37564     Glucose             99 mg/dL  2017 Unknown

 

          COMPREHENSIVE METABOLIC 55033     Bicarbonate           21 mmol/L 2017 Unknown

 

          COMPREHENSIVE METABOLIC 95332     AGAP                11 mmol/L 2017 Unknown

 

          LIPID GROUP 16522     Cholesterol           169 mg/dL 2016 Unkno

wn

 

          LIPID GROUP 77652     Triglyceride           165 mg/dL 2016 Unkn

own

 

          LIPID GROUP 64341     HDL CHOLESTEROL           43 mg/dL  2016 U

nknown

 

          LIPID GROUP 95228     Chol/HDL Ratio           3.93 ratio 2016 U

nknown

 

          LIPID GROUP 20875     NON-HDL Chol           126 mg/dL 2016 Unkn

own

 

          LIPID GROUP 31495     LDL Cholesterol           93 mg/dL  2016 U

nknown

 

          COMPREHENSIVE METABOLIC 23356     AST                 18 U/L    2016 Unknown

 

          COMPREHENSIVE METABOLIC 55787     ALT                 10 U/L    2016 Unknown

 

          COMPREHENSIVE METABOLIC 42810     BUN                 20 mg/dL  2016 Unknown

 

          COMPREHENSIVE METABOLIC 82418     ALBUMIN             3.9 g/dL  2016 Unknown

 

          COMPREHENSIVE METABOLIC 18656     CHLORIDE            110 mmol/L  Unknown

 

          COMPREHENSIVE METABOLIC 20195     Bili Total           0.5 mg/dL  Unknown

 

          COMPREHENSIVE METABOLIC 89923     ALK PHOS            72 U/L    2016 Unknown

 

          COMPREHENSIVE METABOLIC 61477     SODIUM              141 mmol/L  Unknown

 

          COMPREHENSIVE METABOLIC 78934     CREATININE           1.12 mg/dL  Unknown

 

          COMPREHENSIVE METABOLIC 64220     CALCIUM             9.7 mg/dL 2016 Unknown

 

          COMPREHENSIVE METABOLIC 42344     POTASSIUM           4.4 mmol/L  Unknown

 

          COMPREHENSIVE METABOLIC 45022     Total Protein           6.2 g/dL   Unknown

 

          COMPREHENSIVE METABOLIC 54601     Glucose             90 mg/dL  2016 Unknown

 

          COMPREHENSIVE METABOLIC 98356     Bicarbonate           23 mmol/L  Unknown

 

          COMPREHENSIVE METABOLIC 97252     AGAP                8 mmol/L  2016 Unknown

 

          GFR CALC  5148247   GFR Non Afr Amr           47 mL/min 2016 Unk

nown

 

          GFR CALC  5512026   GFR Afr Amr           57 mL/min 2016 Unknown

 

          GLYCOSYLATED HEMOGLOBIN TEST 28192     Hgb A1c   98174-4   5.5 %     0

2016 Unknown

 

          THYROID STIMULATING HORMONE 53333     TSH                 2.537 uIU/mL

 2016 Unknown

 

          FREE T4   67852     T4 Free             1.36 ng/dL 2016 Unknown

 

          COMPLETE BLOOD COUNT 8781902   WBC                 6.8 10e9/L 20

16 Unknown

 

          COMPLETE BLOOD COUNT 9355101   RBC                 4.20 10e12/L 2016 Unknown

 

          COMPLETE BLOOD COUNT [id number]   HEMOGLOBIN           12.5 g/dL 20

16 Unknown

 

          COMPLETE BLOOD COUNT [id number]   HEMATOCRIT           38.0 %    20

16 Unknown

 

          COMPLETE BLOOD COUNT 9394783   MCV                 90.5 fL   

6 Unknown

 

          COMPLETE BLOOD COUNT [id number]   MCH                 29.8 pg   

6 Unknown

 

          COMPLETE BLOOD COUNT 9031114   MCHC                32.9 g/dL 

6 Unknown

 

          COMPLETE BLOOD COUNT 0556697   PLATELET COUNT           197 10e9/L  Unknown

 

          COMPLETE BLOOD COUNT 2114767   Mean Plt Volume           11.7 fL    Unknown

 

          COMPLETE BLOOD COUNT [id number]   Neut Auto           41.3 %    

6 Unknown

 

          COMPLETE BLOOD COUNT [id number]   Lymph Auto           47.1 %    20

16 Unknown

 

          COMPLETE BLOOD COUNT [id number]   Mono Auto           7.8 %     

6 Unknown

 

          COMPLETE BLOOD COUNT [id number]   RDW                 13.8 %    

6 Unknown

 

          COMPLETE BLOOD COUNT [id number]   Eos Auto            3.4 %     

6 Unknown

 

          COMPLETE BLOOD COUNT 3743771   Baso Auto           0.4 %     

6 Unknown

 

          COMPLETE BLOOD COUNT [id number]   Neutrophil Abs           2.81 10e9/L  Unknown

 

          COMPLETE BLOOD COUNT [id number]   Lymphocyte Abs           3.20 10e9/L  Unknown

 

          COMPLETE BLOOD COUNT [id number]   Monocyte Abs           0.53 10e9/L  Unknown

 

          COMPLETE BLOOD COUNT [id number]   Eosinophil Abs           0.23 10e9/L  Unknown

 

          COMPLETE BLOOD COUNT [id number]   RDW-SD              44.4 fL   

6 Unknown

 

          COMPLETE BLOOD COUNT 4481804   Basophil Abs           0.03 10e9/L  Unknown

 

          MEAN GLUC 0346871   Calc Mean Gluc           111 mg/dL 2016 Unkn

own

 

          [place] [address]     Glucose             89 MG/DL   Unknown

 

          [place] [address]     CREATININE           1.12 MG/DL  Unknown

 

          [place] [address]     BUN                 20 MG/DL   Unknown

 

          [place] [address]     SODIUM              139 MMOL/L  Unknown

 

          [place] [address]     POTASSIUM           4.6 MMOL/L  Unknown

 

          [place] [address]     CHLORIDE            108 MMOL/L  Unknown

 

          [place] [address]     BICARB              26 MMOL/L  Unknown

 

          [place] [address]     ANION GAP           5 MEQ/L    Unknown

 

          [place] [address]     CALCIUM             10.0 MG/DL  Unknown

 

          GFR CALC  7825972   GFR AA              57.0L ML/MIN 2015 Unknow

n

 

          GFR CALC  1752790   GFR NON-AA           47.0L ML/MIN 2015 Unkno

wn

 

          THYROID STIMULATING HORMONE 98112     TSH                 2.378 uIU/ML

 09/10/2015 Unknown

 

          COMPLETE BLOOD COUNT 6581249   WBC                 6.4 10e9/L 09/10/20

15 Unknown

 

          COMPLETE BLOOD COUNT [id number]   RBC                 3.99 10e12/L 09/10/

2015 Unknown

 

          COMPLETE BLOOD COUNT [id number]   HGB                 11.9 g/dL 09/10/201

5 Unknown

 

          COMPLETE BLOOD COUNT [id number]   HCT DET             36.9 %    09/10/201

5 Unknown

 

          COMPLETE BLOOD COUNT [id number]   MCV                 92.5 fL   09/10/201

5 Unknown

 

          COMPLETE BLOOD COUNT [id number]   MCH                 29.8 pg   09/10/201

5 Unknown

 

          COMPLETE BLOOD COUNT [id number]   MCHC                32.2 g/dL 09/10/201

5 Unknown

 

          COMPLETE BLOOD COUNT [id number]   PLT                 172 10e9/L 09/10/20

15 Unknown

 

          COMPLETE BLOOD COUNT 5402771   MPV                 11.7 fL   09/10/201

5 Unknown

 

          COMPLETE BLOOD COUNT 4651201   ARIK %               40.4 %    09/10/201

5 Unknown

 

          COMPLETE BLOOD COUNT [id number]   LY %                48.0 %    09/10/201

5 Unknown

 

          COMPLETE BLOOD COUNT [id number]   MON %               8.3 %     09/10/201

5 Unknown

 

          COMPLETE BLOOD COUNT [id number]   EOS  %              2.8 %     09/10/201

5 Unknown

 

          COMPLETE BLOOD COUNT [id number]   BASO %              0.5 %     09/10/201

5 Unknown

 

          COMPLETE BLOOD COUNT [id number]   RDW                 13.6 %    09/10/201

5 Unknown

 

          COMPLETE BLOOD COUNT [id number]   ABS ARIK             2.59 10e9/L 09/10/2

015 Unknown

 

          COMPLETE BLOOD COUNT [id number]   ABS LYMPH           3.07 10e9/L 09/10/2

015 Unknown

 

          COMPLETE BLOOD COUNT [id number]   ABS MONO            0.53 10e9/L 09/10/2

015 Unknown

 

          COMPLETE BLOOD COUNT [id number]   ABS EOS             0.18 10e9/L 09/10/2

015 Unknown

 

          COMPLETE BLOOD COUNT [id number]   ABS BASO            0.03 10e9/L 09/10/2

015 Unknown

 

          COMPLETE BLOOD COUNT [id number]   RDW-SD              44.9 fL   09/10/201

5 Unknown

 

          LIPID GROUP [id number]     HDL TEST            42 MG/DL  09/10/2015 Unknown

 

          LIPID GROUP 08060     TRIG                177 MG/DL 09/10/2015 Unknown

 

          LIPID GROUP 69483     TEST LDL            72 MG/DL  09/10/2015 Unknown

 

          LIPID GROUP 52170     CHOL                149 MG/DL 09/10/2015 Unknown

 

          LIPID GROUP 37518     RCHOL/HDL           3.55 RATIO 09/10/2015 Unknow

n

 

          LIPID GROUP 98453     NON-HDL CH           107 MG/DL 09/10/2015 Unknow

n

 

          GLYCOSYLATED HEMOGLOBIN TEST 35774     A1C HPLC  36972-6   5.5 %     0

9/10/2015 Unknown

 

          FREE T4   44350     FREE T4             1.39 NG/DL 09/10/2015 Unknown

 

          GFR CALC  3768932   GFR AA              55.0L ML/MIN 09/10/2015 Unknow

n

 

          GFR CALC  4099751   GFR NON-AA           46.0L ML/MIN 09/10/2015 Unkno

wn

 

          COMPREHENSIVE METABOLIC 59573     AST                 17 U/L    09/10/

2015 Unknown

 

          COMPREHENSIVE METABOLIC 61786     ALT                 10 IU/L   09/10/

2015 Unknown

 

          COMPREHENSIVE METABOLIC 12827     BUN                 20 MG/DL  09/10/

2015 Unknown

 

          COMPREHENSIVE METABOLIC 82722     ALBUMIN             3.9 GM/DL 09/10/

2015 Unknown

 

          COMPREHENSIVE METABOLIC 02050     CHLORIDE            111 MMOL/L 09/10

/2015 Unknown

 

          COMPREHENSIVE METABOLIC 28855     BILI TOT            0.4 MG/DL 09/10/

2015 Unknown

 

          COMPREHENSIVE METABOLIC 32680     ALK PHOS            70 U/L    09/10/

2015 Unknown

 

          COMPREHENSIVE METABOLIC 83522     SODIUM              142 MMOL/L 09/10

/2015 Unknown

 

          COMPREHENSIVE METABOLIC 02297     CREATININE           1.16 MG/DL  Unknown

 

          COMPREHENSIVE METABOLIC 50946     CALCIUM             9.4 MG/DL 09/10/

2015 Unknown

 

          COMPREHENSIVE METABOLIC 64833     POTASSIUM           4.6 MMOL/L 09/10

/2015 Unknown

 

          COMPREHENSIVE METABOLIC 03240     PROT TOT            6.2 GM/DL 09/10/

2015 Unknown

 

          COMPREHENSIVE METABOLIC 14139     Glucose             90 MG/DL  09/10/

2015 Unknown

 

          COMPREHENSIVE METABOLIC 33964     BICARB              24 MMOL/L 09/10/

2015 Unknown

 

          COMPREHENSIVE METABOLIC 25625     ANION GAP           7 MEQ/L   09/10/

2015 Unknown

 

          THYROID STIMULATING HORMONE 57724     TSH                 2.427 uIU/ML

 2015 Unknown

 

          LIPID GROUP 00324     HDL TEST            47 MG/DL  2015 Unknown

 

          LIPID GROUP 55944     TRIG                145 MG/DL 2015 Unknown

 

          LIPID GROUP 42395     TEST LDL            73 MG/DL  2015 Unknown

 

          LIPID GROUP 26331     CHOL                149 MG/DL 2015 Unknown

 

          LIPID GROUP 85385     RCHOL/HDL           3.17 RATIO 2015 Unknow

n

 

          LIPID GROUP 04958     NON-HDL CH           102 MG/DL 2015 Unknow

n

 

          COMPREHENSIVE METABOLIC 49749     AST                 17 U/L    2015 Unknown

 

          COMPREHENSIVE METABOLIC 35389     ALT                 9 IU/L    2015 Unknown

 

          COMPREHENSIVE METABOLIC 26135     BUN                 19 MG/DL  2015 Unknown

 

          COMPREHENSIVE METABOLIC 76377     ALBUMIN             4.3 GM/DL 2015 Unknown

 

          COMPREHENSIVE METABOLIC 74135     CHLORIDE            108 MMOL/L  Unknown

 

          COMPREHENSIVE METABOLIC 71432     BILI TOT            0.5 MG/DL 2015 Unknown

 

          COMPREHENSIVE METABOLIC 67113     ALK PHOS            68 U/L    2015 Unknown

 

          COMPREHENSIVE METABOLIC 43685     SODIUM              140 MMOL/L  Unknown

 

          COMPREHENSIVE METABOLIC 54270     CREATININE           1.08 MG/DL 2015 Unknown

 

          COMPREHENSIVE METABOLIC 44886     CALCIUM             9.9 MG/DL 2015 Unknown

 

          COMPREHENSIVE METABOLIC 52600     POTASSIUM           4.3 MMOL/L  Unknown

 

          COMPREHENSIVE METABOLIC 19465     PROT TOT            7.2 GM/DL 2015 Unknown

 

          COMPREHENSIVE METABOLIC 07694     Glucose             94 MG/DL  2015 Unknown

 

          COMPREHENSIVE METABOLIC 56955     BICARB              26 MMOL/L 2015 Unknown

 

          COMPREHENSIVE METABOLIC 97776     ANION GAP           6 MEQ/L   2015 Unknown

 

          GFR CALC  9570518   GFR AA              60.0L ML/MIN 2015 Unknow

n

 

          GFR CALC  4675384   GFR NON-AA           49.0L ML/MIN 2015 Unkno

wn

 

          GLYCOSYLATED HEMOGLOBIN TEST 89191     A1C HPLC  75508-1   5.6 %     0

3/06/2015 Unknown

 

          COMPLETE BLOOD COUNT 7341577   WBC                 7.2 10e9/L 20

15 Unknown

 

          COMPLETE BLOOD COUNT 5426807   RBC                 4.28 10e12/L 2015 Unknown

 

          COMPLETE BLOOD COUNT 5129379   HGB                 12.8 g/dL 

5 Unknown

 

          COMPLETE BLOOD COUNT 1106660   HCT DET             39.3 %    

5 Unknown

 

          COMPLETE BLOOD COUNT 3064337   MCV                 91.8 fL   

5 Unknown

 

          COMPLETE BLOOD COUNT 5444025   MCH                 29.9 pg   

5 Unknown

 

          COMPLETE BLOOD COUNT 6957414   MCHC                32.6 g/dL 

5 Unknown

 

          COMPLETE BLOOD COUNT 1110169   PLT                 189 10e9/L 20

15 Unknown

 

          COMPLETE BLOOD COUNT 6104690   MPV                 11.2 fL   

5 Unknown

 

          COMPLETE BLOOD COUNT 7893712   ARIK %               38.0 %    

5 Unknown

 

          COMPLETE BLOOD COUNT [id number]   LY %                51.0 %    

5 Unknown

 

          COMPLETE BLOOD COUNT 4857680   MON %               7.7 %     

5 Unknown

 

          COMPLETE BLOOD COUNT 3238477   EOS  %              2.9 %     

5 Unknown

 

          COMPLETE BLOOD COUNT 9486050   BASO %              0.4 %     

5 Unknown

 

          COMPLETE BLOOD COUNT 1008091   RDW                 14.0 %    

5 Unknown

 

          COMPLETE BLOOD COUNT [id number]   ABS ARIK             2.74 10e9/L 

015 Unknown

 

          COMPLETE BLOOD COUNT [id number]   ABS LYMPH           3.67 10e9/L 

015 Unknown

 

          COMPLETE BLOOD COUNT [id number]   ABS MONO            0.55 10e9/L 

015 Unknown

 

          COMPLETE BLOOD COUNT 1114142   ABS EOS             0.21 10e9/L 

015 Unknown

 

          COMPLETE BLOOD COUNT [id number]   ABS BASO            0.03 10e9/L 

015 Unknown

 

          COMPLETE BLOOD COUNT [id number]   RDW-SD              46.1 fL   

5 Unknown

 

          FREE T4   04689     FREE T4             1.14 NG/DL 2015 Unknown

 

          GLYCOSYLATED HEMOGLOBIN TEST 32297     A1C HPLC  44651-7   5.2 %     0

2014 Unknown

 

          FREE T4   85559     FREE T4             1.40 NG/DL 2014 Unknown

 

          GFR CALC  1610990   GFR AA              >60 ML/MIN 2014 Unknown

 

          GFR CALC  6626071   GFR NON-AA           52.0L ML/MIN 2014 Unkno

wn

 

          COMPREHENSIVE METABOLIC 45962     AST                 15 U/L    2014 Unknown

 

          COMPREHENSIVE METABOLIC 28712     ALT                 9 IU/L    2014 Unknown

 

          COMPREHENSIVE METABOLIC 74739     BUN                 17 MG/DL  2014 Unknown

 

          COMPREHENSIVE METABOLIC 69381     ALBUMIN             4.0 GM/DL 2014 Unknown

 

          COMPREHENSIVE METABOLIC 36462     CHLORIDE            112 MMOL/L  Unknown

 

          COMPREHENSIVE METABOLIC 41062     BILI TOT            0.5 MG/DL 2014 Unknown

 

          COMPREHENSIVE METABOLIC 06874     ALK PHOS            66 U/L    2014 Unknown

 

          COMPREHENSIVE METABOLIC 76424     SODIUM              140 MMOL/L  Unknown

 

          COMPREHENSIVE METABOLIC 39658     CREATININE           1.03 MG/DL  Unknown

 

          COMPREHENSIVE METABOLIC 11175     CALCIUM             9.5 MG/DL 2014 Unknown

 

          COMPREHENSIVE METABOLIC 35829     POTASSIUM           4.1 MMOL/L  Unknown

 

          COMPREHENSIVE METABOLIC 34471     PROT TOT            6.2 GM/DL 2014 Unknown

 

          COMPREHENSIVE METABOLIC 19936     Glucose             102 MG/DL 2014 Unknown

 

          COMPREHENSIVE METABOLIC 70975     BICARB              23 MMOL/L 2014 Unknown

 

          COMPREHENSIVE METABOLIC 21000     ANION GAP           5 MEQ/L   2014 Unknown

 

          THYROID STIMULATING HORMONE 37009     TSH                 2.074 uIU/ML

 2014 Unknown

 

          VITAMIN B 12 FOLIC ACID 38270|18108 VIT B 12            423 PG/ML  Unknown

 

          VITAMIN B 12 FOLIC ACID 24158|12591 FOLIC ACID           19.7 NG/ML  Unknown

 

          LIPID GROUP [id number]     HDL TEST            40 MG/DL  2014 Unknown

 

          LIPID GROUP [id number]     TRIG                145 MG/DL 2014 Unknown

 

          LIPID GROUP [id number]     TEST LDL            81 MG/DL  2014 Unknown

 

          LIPID GROUP [id number]     CHOL                150 MG/DL 2014 Unknown

 

          LIPID GROUP [id number]     RCHOL/HDL           3.75 RATIO 2014 Unknow

n

 

          COMPLETE BLOOD COUNT 6201737   WBC                 6.0 10e9/L 20

14 Unknown

 

          COMPLETE BLOOD COUNT [id number]   RBC                 4.26 10e12/L 2014 Unknown

 

          COMPLETE BLOOD COUNT 7308463   HGB                 12.7 g/dL 

4 Unknown

 

          COMPLETE BLOOD COUNT [id number]   HCT DET             38.7 %    

4 Unknown

 

          COMPLETE BLOOD COUNT [id number]   MCV                 90.8 fL   

4 Unknown

 

          COMPLETE BLOOD COUNT 7271800   MCH                 29.8 pg   

4 Unknown

 

          COMPLETE BLOOD COUNT [id number]   MCHC                32.8 g/dL 

4 Unknown

 

          COMPLETE BLOOD COUNT 1158878   PLT                 178 10e9/L 20

14 Unknown

 

          COMPLETE BLOOD COUNT [id number]   MPV                 11.7 fL   

4 Unknown

 

          COMPLETE BLOOD COUNT [id number]   ARIK %               30.5 %    

4 Unknown

 

          COMPLETE BLOOD COUNT 7422445   LY %                55.4 %    

4 Unknown

 

          COMPLETE BLOOD COUNT 7739139   MON %               9.0 %     

4 Unknown

 

          COMPLETE BLOOD COUNT [id number]   EOS  %              4.4 %     

4 Unknown

 

          COMPLETE BLOOD COUNT 8298262   BASO %              0.7 %     

4 Unknown

 

          COMPLETE BLOOD COUNT 9494048   RDW                 13.3 %    

4 Unknown

 

          COMPLETE BLOOD COUNT [id number]   ABS ARIK             1.83 10e9/L 

014 Unknown

 

          COMPLETE BLOOD COUNT [id number]   ABS LYMPH           3.32 10e9/L 

014 Unknown

 

          COMPLETE BLOOD COUNT 3455053   ABS MONO            0.54 10e9/L 

014 Unknown

 

          COMPLETE BLOOD COUNT 0784597   ABS EOS             0.26 10e9/L 

014 Unknown

 

          COMPLETE BLOOD COUNT 8286667   ABS BASO            0.04 10e9/L 

014 Unknown

 

          COMPLETE BLOOD COUNT [id number]   RDW-SD              43.2 fL   

4 Unknown

 

          HEMOGLOBIN A1C (GLYCOSYLATED) 6006803   A1C HPLC  47563-3   5.5 %     

2012 Unknown

 

          COMPLETE BLOOD COUNT [id number]   WBC                 6.0 10e9/L 20

12 Unknown

 

          COMPLETE BLOOD COUNT 0045223   RBC                 4.22 10e12/L 2012 Unknown

 

          COMPLETE BLOOD COUNT 1332480   HGB                 12.4 g/dL 

2 Unknown

 

          COMPLETE BLOOD COUNT [id number]   HCT DET             38.2 %    

2 Unknown

 

          COMPLETE BLOOD COUNT 1811897   MCV                 90.5 fL   

2 Unknown

 

          COMPLETE BLOOD COUNT 7586255   MCH                 29.4 pg   

2 Unknown

 

          COMPLETE BLOOD COUNT 1414668   MCHC                32.5 g/dL 

2 Unknown

 

          COMPLETE BLOOD COUNT 8165302   PLT                 187 10e9/L 20

12 Unknown

 

          COMPLETE BLOOD COUNT 2135368   MPV                 11.5 fL   

2 Unknown

 

          COMPLETE BLOOD COUNT [id number]   ARIK %               36.4 %    

2 Unknown

 

          COMPLETE BLOOD COUNT 3543043   LY %                51.0 %    

2 Unknown

 

          COMPLETE BLOOD COUNT 8171049   MON %               8.7 %     

2 Unknown

 

          COMPLETE BLOOD COUNT 6620105   EOS  %              3.2 %     

2 Unknown

 

          COMPLETE BLOOD COUNT 5508533   BASO %              0.7 %     

2 Unknown

 

          COMPLETE BLOOD COUNT 0103642   RDW                 13.7 %    

2 Unknown

 

          COMPLETE BLOOD COUNT 1245125   ABS ARIK             2.18 10e9/L 

012 Unknown

 

          COMPLETE BLOOD COUNT [id number]   ABS LYMPH           3.06 10e9/L 

012 Unknown

 

          COMPLETE BLOOD COUNT 6093522   ABS MONO            0.52 10e9/L 

012 Unknown

 

          COMPLETE BLOOD COUNT 0086548   ABS EOS             0.19 10e9/L 

012 Unknown

 

          COMPLETE BLOOD COUNT [id number]   ABS BASO            0.04 10e9/L 

012 Unknown

 

          COMPLETE BLOOD COUNT 2419909   RDW-SD              44.3 fL   

2 Unknown

 

          LIPID GROUP [id number]     HDL TEST            42 MG/DL  2012 Unknown

 

          LIPID GROUP [id number]     TRIG                156 MG/DL 2012 Unknown

 

          LIPID GROUP [id number]     TEST LDL            80 MG/DL  2012 Unknown

 

          LIPID GROUP 73806     CHOL                153 MG/DL 2012 Unknown

 

          LIPID GROUP [id number]     RCHOL/HDL           3.64 RATIO 2012 Unknow

n

 

          FREE T4   41395     FREE T4             1.22 NG/DL 2012 Unknown

 

          COMPREHENSIVE METABOLIC 04886     AST                 20 U/L    2012 Unknown

 

          COMPREHENSIVE METABOLIC 26983     ALT                 11 IU/L   2012 Unknown

 

          COMPREHENSIVE METABOLIC 26325     BUN                 19 MG/DL  2012 Unknown

 

          COMPREHENSIVE METABOLIC 12851     ALBUMIN             4.3 GM/DL 2012 Unknown

 

          COMPREHENSIVE METABOLIC 66535     CHLORIDE            109 MMOL/L  Unknown

 

          COMPREHENSIVE METABOLIC 21319     BILI TOT            0.6 MG/DL 2012 Unknown

 

          COMPREHENSIVE METABOLIC 79577     ALK PHOS            84 U/L    2012 Unknown

 

          COMPREHENSIVE METABOLIC 15947     SODIUM              142 MMOL/L  Unknown

 

          COMPREHENSIVE METABOLIC 51365     CREATININE           1.09 MG/DL  Unknown

 

          COMPREHENSIVE METABOLIC 64856     CALCIUM             9.8 MG/DL 2012 Unknown

 

          COMPREHENSIVE METABOLIC 88142     POTASSIUM           4.2 MMOL/L  Unknown

 

          COMPREHENSIVE METABOLIC 03198     PROT TOT            6.4 GM/DL 2012 Unknown

 

          COMPREHENSIVE METABOLIC 82936     Glucose             89 MG/DL  2012 Unknown

 

          COMPREHENSIVE METABOLIC 24447     BICARB              25 MMOL/L 2012 Unknown

 

          COMPREHENSIVE METABOLIC 31764     ANION GAP           8 MEQ/L   2012 Unknown

 

          GFR CALC  5677211   GFR AA              60.0L ML/MIN 2012 Unknow

n

 

          GFR CALC  8079355   GFR NON-AA           49.0L ML/MIN 2012 Unkno

wn

 

          THYROID STIMULATING HORMONE 61350     TSH                 2.450 uIU/ML

 2012 Unknown

 

          COMPREHENSIVE METABOLIC 31336     AST                 22 U/L    2012 Unknown

 

          COMPREHENSIVE METABOLIC 49443     ALT                 14 IU/L   2012 Unknown

 

          COMPREHENSIVE METABOLIC 29273     BUN                 21 MG/DL  2012 Unknown

 

          COMPREHENSIVE METABOLIC 53220     ALBUMIN             4.3 GM/DL 2012 Unknown

 

          COMPREHENSIVE METABOLIC 40628     CHLORIDE            106 MMOL/L  Unknown

 

          COMPREHENSIVE METABOLIC 65348     BILI TOT            0.4 MG/DL 2012 Unknown

 

          COMPREHENSIVE METABOLIC 89257     ALK PHOS            80 U/L    2012 Unknown

 

          COMPREHENSIVE METABOLIC 34041     SODIUM              141 MMOL/L  Unknown

 

          COMPREHENSIVE METABOLIC 30572     CREATININE           1.13 MG/DL  Unknown

 

          COMPREHENSIVE METABOLIC 00710     CALCIUM             9.4 MG/DL 2012 Unknown

 

          COMPREHENSIVE METABOLIC 72359     POTASSIUM           4.3 MMOL/L  Unknown

 

          COMPREHENSIVE METABOLIC 37352     PROT TOT            6.7 GM/DL 2012 Unknown

 

          COMPREHENSIVE METABOLIC 50552     Glucose             98 MG/DL  2012 Unknown

 

          COMPREHENSIVE METABOLIC 79112     BICARB              25 MMOL/L 2012 Unknown

 

          COMPREHENSIVE METABOLIC 87745     ANION GAP           10 MEQ/L  2012 Unknown

 

          LIPID GROUP 50146     HDL TEST            44 MG/DL  2012 Unknown

 

          LIPID GROUP 13576     TRIG                164 MG/DL 2012 Unknown

 

          LIPID GROUP 89477     TEST LDL            98 MG/DL  2012 Unknown

 

          LIPID GROUP 62862     CHOL                175 MG/DL 2012 Unknown

 

          LIPID GROUP 13027     RCHOL/HDL           3.98 RATIO 2012 Unknow

n

 

          COMPLETE BLOOD COUNT 49065     WBC                 6.7 10e9/L 20

12 Unknown

 

          COMPLETE BLOOD COUNT 77790     RBC                 4.36 10e12/L 2012 Unknown

 

          COMPLETE BLOOD COUNT 04955     HGB                 12.9 g/dL 

2 Unknown

 

          COMPLETE BLOOD COUNT 49774     HCT DET             39.4 %    

2 Unknown

 

          COMPLETE BLOOD COUNT 27411     MCV                 90.4 fL   

2 Unknown

 

          COMPLETE BLOOD COUNT 38320     MCH                 29.6 pg   

2 Unknown

 

          COMPLETE BLOOD COUNT 24781     MCHC                32.7 g/dL 

2 Unknown

 

          COMPLETE BLOOD COUNT 43302     PLT                 184 10e9/L 20

12 Unknown

 

          COMPLETE BLOOD COUNT 00612     MPV                 10.9 fL   

2 Unknown

 

          COMPLETE BLOOD COUNT 72864     ARIK %               41.5 %    

2 Unknown

 

          COMPLETE BLOOD COUNT 63427     LY %                45.7 %    

2 Unknown

 

          COMPLETE BLOOD COUNT 63239     MON %               9.4 %     

2 Unknown

 

          COMPLETE BLOOD COUNT 40186     EOS  %              3.0 %     

2 Unknown

 

          COMPLETE BLOOD COUNT 56304     BASO %              0.4 %     

2 Unknown

 

          COMPLETE BLOOD COUNT 77198     RDW                 13.2 %    

2 Unknown

 

          COMPLETE BLOOD COUNT 74550     ABS ARIK             2.78 10e9/L 

012 Unknown

 

          COMPLETE BLOOD COUNT 43347     ABS LYMPH           3.06 10e9/L 

012 Unknown

 

          COMPLETE BLOOD COUNT 68443     ABS MONO            0.63 10e9/L 

012 Unknown

 

          COMPLETE BLOOD COUNT 28999     ABS EOS             0.20 10e9/L 

012 Unknown

 

          COMPLETE BLOOD COUNT 49257     ABS BASO            0.03 10e9/L 

012 Unknown

 

          COMPLETE BLOOD COUNT 90615     RDW-SD              42.3 fL   

2 Unknown

 

          GFR CALC  1217053   GFR AA              57.0L ML/MIN 2012 Unknow

n

 

          GFR CALC  7195828   GFR NON-AA           47.0L ML/MIN 2012 Unkno

wn

 

          THYROID STIMULATING HORMONE 35477     TSH                 2.663 uIU/ML

 2012 Unknown

 

          FREE T4   52525     FREE T4             1.15 NG/DL 2012 Unknown

 

          THYROID STIMULATING HORMONE 69682     TSH                 1.908 uIU/ML

 2011 Unknown

 

          COMPLETE BLOOD COUNT 93251     WBC                 6.4 10e9/L 20

11 Unknown

 

          COMPLETE BLOOD COUNT 04448     RBC                 3.92 10e12/L 2011 Unknown

 

          COMPLETE BLOOD COUNT 12624     HGB                 11.8 g/dL 

1 Unknown

 

          COMPLETE BLOOD COUNT 26573     HCT DET             36.0 %    

1 Unknown

 

          COMPLETE BLOOD COUNT 48069     MCV                 91.8 fL   

1 Unknown

 

          COMPLETE BLOOD COUNT 68005     MCH                 30.1 pg   

1 Unknown

 

          COMPLETE BLOOD COUNT 91963     MCHC                32.8 g/dL 

1 Unknown

 

          COMPLETE BLOOD COUNT 22359     PLT                 176 10e9/L 20

11 Unknown

 

          COMPLETE BLOOD COUNT 85428     MPV                 11.4 fL   

1 Unknown

 

          COMPLETE BLOOD COUNT 06715     ARIK %               50.4 %    

1 Unknown

 

          COMPLETE BLOOD COUNT 50515     LY %                35.5 %    

1 Unknown

 

          COMPLETE BLOOD COUNT 80035     MON %               10.2 %    

1 Unknown

 

          COMPLETE BLOOD COUNT 79572     EOS  %              3.3 %     

1 Unknown

 

          COMPLETE BLOOD COUNT 00582     BASO %              0.6 %     

1 Unknown

 

          COMPLETE BLOOD COUNT 04134     RDW                 13.7 %    

1 Unknown

 

          COMPLETE BLOOD COUNT 06370     ABS ARIK             3.23 10e9/L 

011 Unknown

 

          COMPLETE BLOOD COUNT 65038     ABS LYMPH           2.27 10e9/L 

011 Unknown

 

          COMPLETE BLOOD COUNT 03545     ABS MONO            0.65 10e9/L 

011 Unknown

 

          COMPLETE BLOOD COUNT 37981     ABS EOS             0.21 10e9/L 

011 Unknown

 

          COMPLETE BLOOD COUNT 17519     ABS BASO            0.04 10e9/L 

011 Unknown

 

          COMPLETE BLOOD COUNT 46595     RDW-SD              45.3 fL   

1 Unknown

 

          GFR CALC  4517167   GFR AA              >60 ML/MIN 2011 Unknown

 

          GFR CALC  8409246   GFR NON-AA           53.0L ML/MIN 2011 Unkno

wn

 

          FREE T4   74474     FREE T4             1.20 NG/DL 2011 Unknown

 

          COMPREHENSIVE METABOLIC 99059     AST                 17 U/L    2011 Unknown

 

          COMPREHENSIVE METABOLIC 17548     ALT                 9 IU/L    2011 Unknown

 

          COMPREHENSIVE METABOLIC 24261     BUN                 16 MG/DL  2011 Unknown

 

          COMPREHENSIVE METABOLIC 11132     ALBUMIN             4.0 GM/DL 2011 Unknown

 

          COMPREHENSIVE METABOLIC 87563     CHLORIDE            108 MMOL/L  Unknown

 

          COMPREHENSIVE METABOLIC 06194     BILI TOT            0.5 MG/DL 2011 Unknown

 

          COMPREHENSIVE METABOLIC 58191     ALK PHOS            76 U/L    2011 Unknown

 

          COMPREHENSIVE METABOLIC 88328     SODIUM              139 MMOL/L  Unknown

 

          COMPREHENSIVE METABOLIC 71709     CREATININE           1.02 MG/DL 2011 Unknown

 

          COMPREHENSIVE METABOLIC 88972     CALCIUM             9.2 MG/DL 2011 Unknown

 

          COMPREHENSIVE METABOLIC 70101     POTASSIUM           4.5 MMOL/L  Unknown

 

          COMPREHENSIVE METABOLIC 93978     PROT TOT            6.1 GM/DL 2011 Unknown

 

          COMPREHENSIVE METABOLIC 28084     Glucose             93 MG/DL  2011 Unknown

 

          COMPREHENSIVE METABOLIC 43448     BICARB              26 MMOL/L 2011 Unknown

 

          COMPREHENSIVE METABOLIC 68930     ANION GAP           5 MEQ/L   2011 Unknown

 

          LIPID GROUP 77202     HDL TEST            46 MG/DL  2011 Unknown

 

          LIPID GROUP 80641     TRIG                102 MG/DL 2011 Unknown

 

          LIPID GROUP 57868     TEST LDL            88 MG/DL  2011 Unknown

 

          LIPID GROUP 66897     CHOL                154 MG/DL 2011 Unknown

 

          LIPID GROUP 75376     RCHOL/HDL           3.35 RATIO 2011 Unknow

n







Procedures





                    Procedure           Codes               Date

 

                    ROUTINE VENIPUNCTURE CPT-4: 66548        2020

 

                    ASSAY THYROID STIM HORMONE CPT-4: 93816        2020

 

                    COMPLETE CBC W/AUTO DIFF WBC CPT-4: 93044        2020

 

                    COMPREHEN METABOLIC PANEL CPT-4: 40304        2020

 

                    ROUTINE VENIPUNCTURE CPT-4: 17063        2019

 

                    LIPID PANEL         CPT-4: 52615        2019

 

                    FLU VACC PRSV FREE INC ANTIG 65 AND OLDER CPT-4: 06157      

  10/22/2019

 

                    FLU VACC PRSV FREE INC ANTIG 65 AND OLDER CPT-4: 79741      

  10/22/2019

 

                    ADMIN INFLUENZA VIRUS VAC CPT-4:         10/22/2019

 

                    COMPREHEN METABOLIC PANEL CPT-4: 14296        10/11/2019

 

                    ROUTINE VENIPUNCTURE CPT-4: 96760        10/11/2019

 

                    ROUTINE VENIPUNCTURE CPT-4: 90583        06/10/2019

 

                    ASSAY THYROID STIM HORMONE CPT-4: 73306        06/10/2019

 

                    COMPREHEN METABOLIC PANEL CPT-4: 06928        06/10/2019

 

                    COMPLETE CBC W/AUTO DIFF WBC CPT-4: 63240        06/10/2019

 

                    URINALYSIS NONAUTO W/O SCOPE CPT-4: 19978        2019

 

                    URINE CULTURE/ COLONY COUNT CPT-4: 36587        2019

 

                    URINE CULTURE/ COLONY COUNT CPT-4: 25425        10/02/2018

 

                    ROUTINE VENIPUNCTURE CPT-4: 27224        2018

 

                    ASSAY OF FREE THYROXINE CPT-4: 78817        2018

 

                    ASSAY THYROID STIM HORMONE CPT-4: 44255        2018

 

                    COMPLETE CBC W/AUTO DIFF WBC CPT-4: 97349        2018

 

                    METABOLIC PANEL TOTAL CA CPT-4: 60007        2018

 

                    FLU VACC PRSV FREE INC ANTIG 65 AND OLDER CPT-4: 61653      

  2018

 

                    ASSAY, GLUCOSE, BLOOD QUANT CPT-4: 84398        2018

 

                    ADMIN INFLUENZA VIRUS VAC CPT-4:         2018

 

                    ROUTINE VENIPUNCTURE CPT-4: 94468        2018

 

                    COMPREHEN METABOLIC PANEL CPT-4: 43985        2018

 

                    COMPLETE CBC W/AUTO DIFF WBC CPT-4: 25933        2018

 

                    A1C HPLC            CPT-4: 32600        2018

 

                    ASSAY OF TROPONIN QUANT CPT-4: 08114        2018

 

                    LIPID PANEL         CPT-4: 79450        2018

 

                    THER/PROPH/DIAG INJ SC/IM CPT-4: 34219        2018

 

                    TRIAMCINOLONE ACET INJ NOS CPT-4:         2018

 

                    URINALYSIS NONAUTO W/O SCOPE CPT-4: 53042        2018

 

                    URINE CULTURE/ COLONY COUNT CPT-4: 67944        2018

 

                    FLU VACC PRSV FREE INC ANTIG 65 AND OLDER CPT-4: 97928      

  10/06/2017

 

                    ADMIN INFLUENZA VIRUS VAC CPT-4:         10/06/2017

 

                    ROUTINE VENIPUNCTURE CPT-4: 64161        2017

 

                    COMPREHEN METABOLIC PANEL CPT-4: 50816        2017

 

                    COMPLETE CBC W/AUTO DIFF WBC CPT-4: 19288        2017

 

                    LIPID PANEL         CPT-4: 34348        2017

 

                    A1C HPLC            CPT-4: 00131        2017

 

                    ASSAY THYROID STIM HORMONE CPT-4: 79460        2017

 

                    ROUTINE VENIPUNCTURE CPT-4: 99014        2017

 

                    ASSAY THYROID STIM HORMONE CPT-4: 93640        2017

 

                    COMPREHEN METABOLIC PANEL CPT-4: 10544        2017

 

                    COMPLETE CBC W/AUTO DIFF WBC CPT-4: 35336        2017

 

                    A1C HPLC            CPT-4: 86705        2017

 

                    FLU VACC PRSV FREE INC ANTIG 65 AND OLDER CPT-4: 11556      

  10/07/2016

 

                    ADMIN INFLUENZA VIRUS VAC CPT-4:         10/07/2016

 

                    ROUTINE VENIPUNCTURE CPT-4: 43534        2016

 

                    ASSAY OF FREE THYROXINE CPT-4: 72724        2016

 

                    ASSAY THYROID STIM HORMONE CPT-4: 07594        2016

 

                    COMPREHEN METABOLIC PANEL CPT-4: 71354        2016

 

                    COMPLETE CBC W/AUTO DIFF WBC CPT-4: 95885        2016

 

                    LIPID PANEL         CPT-4: 16993        2016

 

                    A1C HPLC            CPT-4: 72428        2016

 

                    URINALYSIS NONAUTO W/O SCOPE CPT-4: 03989        2016

 

                    ROUTINE VENIPUNCTURE CPT-4: 11177        2015

 

                    METABOLIC PANEL TOTAL CA CPT-4: 60450        2015

 

                    PRESCRIP TRANSMIT VIA ERX SY CPT-4:         2015

 

                    FLU VACC PRSV FREE INC ANTIG 65 AND OLDER CPT-4: 78815      

  10/16/2015

 

                    ADMIN INFLUENZA VIRUS VAC CPT-4:         10/16/2015

 

                    URINALYSIS NONAUTO W/O SCOPE CPT-4: 41825        09/15/2015

 

                    URINE CULTURE/ COLONY COUNT CPT-4: 16550        09/15/2015

 

                    ROUTINE VENIPUNCTURE CPT-4: 46567        09/10/2015

 

                    ASSAY OF FREE THYROXINE CPT-4: 51828        09/10/2015

 

                    ASSAY THYROID STIM HORMONE CPT-4: 59734        09/10/2015

 

                    COMPREHEN METABOLIC PANEL CPT-4: 63439        09/10/2015

 

                    COMPLETE CBC W/AUTO DIFF WBC CPT-4: 40034        09/10/2015

 

                    LIPID PANEL         CPT-4: 60082        09/10/2015

 

                    A1C HPLC            CPT-4: 52695        09/10/2015

 

                    CERUM REMOVAL       CPT-4: 29638        2015

 

                    PRESCRIP TRANSMIT VIA ERX SY CPT-4:         2015

 

                    FLUZONE, 5ML (Medicare) CPT-4:         10/17/2014

 

                    ADMIN INFLUENZA VIRUS VAC CPT-4:         10/17/2014

 

                    PRESCRIP TRANSMIT VIA ERX SY CPT-4:         2014

 

                    PRESCRIP TRANSMIT VIA ERX SY CPT-4:         2014

 

                    PRESCRIP TRANSMIT VIA ERX SY CPT-4:         2014

 

                    ROUTINE VENIPUNCTURE CPT-4: 55220        2014

 

                    ASSAY OF FREE THYROXINE CPT-4: 55156        2014

 

                    ASSAY THYROID STIM HORMONE CPT-4: 87545        2014

 

                    COMPREHEN METABOLIC PANEL CPT-4: 78233        2014

 

                    COMPLETE CBC W/AUTO DIFF WBC CPT-4: 60931        2014

 

                    LIPID PANEL         CPT-4: 73406        2014

 

                    A1C HPLC            CPT-4: 04260        2014

 

                    VITAMIN B 12 FOLIC ACID CPT-4: 49975|62352  2014

 

                    PRESCRIP TRANSMIT VIA ERX SY CPT-4:         2014

 

                    PRESCRIP TRANSMIT VIA ERX SY CPT-4:         10/15/2013

 

                    FLUZONE, 5ML (Medicare) CPT-4:         2013

 

                    ADMIN INFLUENZA VIRUS VAC CPT-4:         2013

 

                    PRESCRIP TRANSMIT VIA ERX SY CPT-4:         2013

 

                    PRESCRIP TRANSMIT VIA ERX SY CPT-4:         05/10/2013

 

                    ROUTINE VENIPUNCTURE CPT-4: 09845        2012

 

                    ASSAY OF FREE THYROXINE CPT-4: 08136        2012

 

                    ASSAY THYROID STIM HORMONE CPT-4: 56670        2012

 

                    COMPREHEN METABOLIC PANEL CPT-4: 50715        2012

 

                    COMPLETE CBC W/AUTO DIFF WBC CPT-4: 18676        2012

 

                    LIPID PANEL         CPT-4: 58888        2012

 

                    A1C GLYCOSYLATED HEMOGLOBIN TEST CPT-4: 54226        

012

 

                    CERUM REMOVAL       CPT-4: 96659        2012

 

                    PRESCRIP TRANSMIT VIA ERX SY CPT-4:         2012

 

                    PRESCRIP TRANSMIT VIA ERX SY CPT-4:         10/10/2012

 

                    FLUZONE, 5ML (Medicare) CPT-4:         2012

 

                    ADMIN INFLUENZA VIRUS VAC CPT-4:         2012

 

                    ASSAY, GLUCOSE, BLOOD QUANT CPT-4: 61048        2012

 

                    URINALYSIS NONAUTO W/O SCOPE CPT-4: 40717        2012

 

                    URINE CULTURE/ COLONY COUNT CPT-4: 37551        2012

 

                    ROUTINE VENIPUNCTURE CPT-4: 09830        2012

 

                    ASSAY OF FREE THYROXINE CPT-4: 39498        2012

 

                    ASSAY THYROID STIM HORMONE CPT-4: 10493        2012

 

                    COMPREHEN METABOLIC PANEL CPT-4: 81030        2012

 

                    COMPLETE CBC W/AUTO DIFF WBC CPT-4: 69603        2012

 

                    LIPID PANEL         CPT-4: 84822        2012

 

                    ASSAY OF INSULIN    CPT-4: 37261        2012

 

                    A1C GLYCOSYLATED HEMOGLOBIN TEST CPT-4: 12706        

012

 

                    DRAIN/INJECT JOINT/BURSA CPT-4: 49997        2012

 

                    METHYLPREDNISOLONE 40 MG INJ CPT-4:         2012

 

                    TRIAMCINOLONE ACET INJ NOS CPT-4:         2012

 

                    PRESCRIP TRANSMIT VIA ERX SY CPT-4:         2012

 

                    PRESCRIP TRANSMIT VIA ERX SY CPT-4:         2012

 

                    METHYLPREDNISOLONE 40 MG INJ CPT-4:         2012

 

                    DRAIN/INJECT JOINT/BURSA CPT-4: 88676        2012

 

                    TRIAMCINOLONE ACET INJ NOS CPT-4:         2012

 

                    PRESCRIP TRANSMIT VIA ERX SY CPT-4:         2012

 

                    ROUTINE VENIPUNCTURE CPT-4: 90667        2012

 

                    ASSAY OF FREE THYROXINE CPT-4: 69522        2012

 

                    ASSAY THYROID STIM HORMONE CPT-4: 43299        2012

 

                    COMPREHEN METABOLIC PANEL CPT-4: 22663        2012

 

                    COMPLETE CBC W/AUTO DIFF WBC CPT-4: 06250        2012

 

                    LIPID PANEL         CPT-4: 60932        2012

 

                    PRESCRIP TRANSMIT VIA ERX SY CPT-4:         2012

 

                    CERUM REMOVAL       CPT-4: 66010        2011

 

                    PRESCRIP TRANSMIT VIA ERX SY CPT-4:         2011

 

                    FLUZONE, 5ML (Medicare) CPT-4:         10/05/2011

 

                    ADMIN INFLUENZA VIRUS VAC CPT-4:         10/05/2011

 

                    PRESCRIP TRANSMIT VIA ERX SY CPT-4:         2011

 

                    URINALYSIS NONAUTO W/O SCOPE CPT-4: 98215        2011

 

                    URINE CULTURE/ COLONY COUNT CPT-4: 72536        2011

 

                    CUR TOBACCO NON-USER CPT-4:         2011

 

                    ROUTINE VENIPUNCTURE CPT-4: 39482        2011

 

                    COMPLETE CBC W/AUTO DIFF WBC CPT-4: 35258        2011

 

                    COMPREHEN METABOLIC PANEL CPT-4: 85694        2011

 

                    LIPID PANEL         CPT-4: 42334        2011

 

                    ASSAY THYROID STIM HORMONE CPT-4: 15627        2011

 

                    ASSAY OF FREE THYROXINE CPT-4: 31396        2011

 

                    PRESCRIP TRANSMIT VIA ERX SY CPT-4:         2011

 

                    INJ TRIGGER POINT 1/2 MUSCL CPT-4: 80238        2011

 

                    TRIAMCINOLONE ACET INJ NOS CPT-4:         2011

 

                    METHYLPREDNISOLONE 40 MG INJ CPT-4:         2011

 

                    THER/PROPH/DIAG INJ SC/IM CPT-4: 01434        2011

 

                    KETOROLAC TROMETHAMINE INJ CPT-4:         2011

 

                    PRESCRIP TRANSMIT VIA ERX SY CPT-4:         2010

 

                    FLU VACCINE 3 YRS & > IM UP 64 CPT-4: 77460        10/06/201

0

 

                    ADMIN INFLUENZA VIRUS VAC CPT-4:         10/06/2010

 

                    URINALYSIS NONAUTO W/O SCOPE CPT-4: 14158        2010

 

                    URINE CULTURE/ COLONY COUNT CPT-4: 29653        2010

 

                    PRESCRIP TRANSMIT VIA ERX SY CPT-4:         2010

 

                    THER/PROPH/DIAG INJ SC/IM CPT-4: 86383        2010

 

                    VITAMIN B12 INJECTION CPT-4:         2010

 

                    THER/PROPH/DIAG INJ SC/IM CPT-4: 87960        2010

 

                    VITAMIN B12 INJECTION CPT-4:         2010

 

                    ROUTINE VENIPUNCTURE CPT-4: 96719        2010







Vital Signs





                          Date                      Vital

 

                2020      Blood Pressure 1: 148/66 Code: 8480-6 Heart Rate

 1: 56 bpm 

Respiratory Rate: 20 bpm SpO2: 99%           Temperature: 36.6 (C) / 97.8 (F) We

ight: 211 

lbs 

 

                2020      Blood Pressure 1: 138/64 Code: 8480-6 Heart Rate

 1: 52 bpm 

Respiratory Rate: 18 bpm  SpO2: 98%                 Temperature: 36.3 (C) / 97.4

 (F)

 

                    2020          Blood Pressure 1: 144/82 Code: 8480-6 He

art Rate 1: 56 bpm

 

                2020      Blood Pressure 1: 154/86 Code: 8480-6 Heart Rate

 1: 64 bpm 

Respiratory Rate: 20 bpm SpO2: 99%           Temperature: 37.1 (C) / 98.7 (F) We

ight: 215 

lbs 

 

             2019   Blood Pressure 1: 140/70 Code: 8480-6 Heart Rate 1: 57

 bpm SpO2: 99%    

Temperature: 35.9 (C) / 96.6 (F)        Weight: 215 lbs 

 

                06/10/2019      Blood Pressure 1: 144/70 Code: 8480-6 Heart Rate

 1: 60 bpm 

Respiratory Rate: 20 bpm SpO2: 95%           Temperature: 36.4 (C) / 97.6 (F) We

ight: 214 

lbs 

 

                2019      Blood Pressure 1: 128/78 Code: 8480-6 Heart Rate

 1: 56 bpm 

Respiratory Rate: 20 bpm SpO2: 98%           Temperature: 36.3 (C) / 97.4 (F) We

ight: 213 

lbs 

 

                2018      Blood Pressure 1: 142/60 Code: 8480-6 BMI: 38.2 

Code: 81795-7 Heart 

Rate 1: 48 bpm  Height: 5'2"    Respiratory Rate: 20 bpm SpO2: 98%       Tempera

ture: 36.7

(C) / 98.1 (F)                          Weight: 212 lbs 

 

                2018      Blood Pressure 1: 142/64 Code: 8480-6 BMI: 38.5 

Code: 97309-5 Heart 

Rate 1: 52 bpm  Height: 5'2"    Respiratory Rate: 22 bpm SpO2: 96%       Tempera

ture: 36.1

(C) / 96.9 (F)                          Weight: 214 lbs 

 

                10/02/2018      Blood Pressure 1: 124/80 Code: 8480-6 BMI: 38.3 

Code: 48248-2 Heart 

Rate 1: 68 bpm      Height: 5'2"        Respiratory Rate: 20 bpm Temperature: 36

.3 (C) / 

97.4 (F)                                Weight: 213 lbs 

 

                2018      Blood Pressure 1: 132/78 Code: 8480-6 BMI: 37.6 

Code: 64730-2 Heart 

Rate 1: 68 bpm  Height: 5'2"    Respiratory Rate: 20 bpm SpO2: 97%       Tempera

ture: 36.8

(C) / 98.2 (F)                          Weight: 209 lbs 

 

                2018      Blood Pressure 1: 150/76 Code: 8480-6 BMI: 38.5 

Code: 78698-9 Heart 

Rate 1: 64 bpm  Height: 5'2"    Respiratory Rate: 20 bpm SpO2: 97%       Tempera

ture: 36.2

(C) / 97.2 (F)                          Weight: 214 lbs 

 

                2018      Blood Pressure 1: 122/74 Code: 8480-6 BMI: 38.2 

Code: 90743-8 Heart 

Rate 1: 64 bpm  Height: 5'2"    Respiratory Rate: 18 bpm SpO2: 96%       Tempera

ture: 35.8

(C) / 96.4 (F)                          Weight: 212 lbs 

 

                2018      Blood Pressure 1: 124/78 Code: 8480-6 BMI: 37.8 

Code: 56990-4 Heart 

Rate 1: 76 bpm      Height: 5'2"        Respiratory Rate: 20 bpm Temperature: 36

.8 (C) / 

98.3 (F)                                Weight: 210 lbs 

 

                2018      Blood Pressure 1: 136/70 Code: 8480-6 BMI: 38.0 

Code: 69296-6 Heart 

Rate 1: 68 bpm  Height: 5'2"    Respiratory Rate: 20 bpm SpO2: 97%       Tempera

ture: 36.8

(C) / 98.2 (F)                          Weight: 211 lbs 

 

                2018      Blood Pressure 1: 140/65 Code: 8480-6 Heart Rate

 1: 75 bpm 

Respiratory Rate: 24 bpm SpO2: 95%           Temperature: 37.0 (C) / 98.6 (F) We

ight: 211 

lbs 

 

                2018      Blood Pressure 1: 154/70 Code: 8480-6 BMI: 37.6 

Code: 58280-1 Heart 

Rate 1: 76 bpm  Height: 5'2"    Respiratory Rate: 20 bpm SpO2: 98%       Tempera

ture: 36.9

(C) / 98.5 (F)                          Weight: 209 lbs 

 

                2017      Blood Pressure 1: 156/70 Code: 8480-6 BMI: 37.1 

Code: 32023-3 Heart 

Rate 1: 72 bpm  Height: 5'2"    Respiratory Rate: 20 bpm SpO2: 97%       Tempera

ture: 37.0

(C) / 98.6 (F)                          Weight: 206 lbs 

 

                2017      Blood Pressure 1: 152/78 Code: 8480-6 BMI: 36.8 

Code: 87839-8 Heart 

Rate 1: 78 bpm  Height: 5'2"    Respiratory Rate: 20 bpm SpO2: 98%       Tempera

ture: 36.1

(C) / 97.0 (F)                          Weight: 204 lbs 

 

                2017      Blood Pressure 1: 142/70 Code: 8480-6 BMI: 36.9 

Code: 80101-0 Heart 

Rate 1: 64 bpm  Height: 5'2"    Respiratory Rate: 20 bpm SpO2: 96%       Tempera

ture: 36.5

(C) / 97.7 (F)                          Weight: 205 lbs 

 

                2017      Blood Pressure 1: 142/68 Code: 8480-6 Heart Rate

 1: 88 bpm 

Respiratory Rate: 18 bpm SpO2: 98%           Temperature: 36.3 (C) / 97.3 (F) We

ight: 209 

lbs 

 

                2016      Blood Pressure 1: 130/78 Code: 8480-6 Heart Rate

 1: 68 bpm 

Respiratory Rate: 20 bpm SpO2: 95%           Temperature: 36.4 (C) / 97.6 (F) We

ight: 212 

lbs 

 

                2016      Blood Pressure 1: 122/64 Code: 8480-6 BMI: 39.1 

Code: 55693-3 Heart 

Rate 1: 76 bpm      Height: 5'2"        Respiratory Rate: 20 bpm Temperature: 36

.8 (C) / 

98.2 (F)                                Weight: 217 lbs 

 

                2016      Blood Pressure 1: 144/70 Code: 8480-6 BMI: 39.4 

Code: 68889-4 Heart 

Rate 1: 76 bpm      Height: 5'2"        Respiratory Rate: 20 bpm Temperature: 36

.6 (C) / 

97.9 (F)                                Weight: 219 lbs 

 

                2015      Blood Pressure 1: 152/60 Code: 8480-6 BMI: 39.6 

Code: 12958-6 Heart 

Rate 1: 84 bpm      Height: 5'2"        Respiratory Rate: 20 bpm Temperature: 37

.0 (C) / 

98.6 (F)                                Weight: 220 lbs 

 

                2015      Blood Pressure 1: 146/76 Code: 8480-6 BMI: 39.8 

Code: 56059-2 Heart 

Rate 1: 88 bpm      Height: 5'2"        Respiratory Rate: 20 bpm Temperature: 37

.0 (C) / 

98.6 (F)                                Weight: 221 lbs 

 

                2015      Blood Pressure 1: 132/70 Code: 8480-6 BMI: 39.1 

Code: 70635-0 Heart 

Rate 1: 88 bpm      Height: 5'2"        Respiratory Rate: 20 bpm Temperature: 36

.4 (C) / 

97.6 (F)                                Weight: 217 lbs 

 

                2015      Blood Pressure 1: 132/66 Code: 8480-6 BMI: 39.9 

Code: 61678-5 Heart 

Rate 1: 72 bpm      Height: 5'2"        Respiratory Rate: 20 bpm Temperature: 36

.9 (C) / 

98.4 (F)                                Weight: 218 lbs 

 

                2015      Blood Pressure 1: 136/80 Code: 8480-6 Heart Rate

 1: 76 bpm 

Respiratory Rate: 20 bpm  Temperature: 36.7 (C) / 98.0 (F) Weight: 224 lbs 

 

                2015      Blood Pressure 1: 134/78 Code: 8480-6 BMI: 39.7 

Code: 36928-8 Heart 

Rate 1: 84 bpm      Height: 5'2"        Respiratory Rate: 20 bpm Temperature: 36

.7 (C) / 

98.0 (F)                                Weight: 217 lbs 

 

                2014      Blood Pressure 1: 146/78 Code: 8480-6 BMI: 39.5 

Code: 86939-3 Heart 

Rate 1: 82 bpm      Height: 5'2"        Respiratory Rate: 18 bpm Temperature: 35

.6 (C) / 

96.1 (F)                                Weight: 216 lbs 

 

                2014      Blood Pressure 1: 134/70 Code: 8480-6 BMI: 37.9 

Code: 78224-6 Heart 

Rate 1: 80 bpm      Height: 5'3"        Respiratory Rate: 20 bpm Temperature: 36

.8 (C) / 

98.2 (F)                                Weight: 214 lbs 

 

                2014      Blood Pressure 1: 132/70 Code: 8480-6 BMI: 37.6 

Code: 96224-6 Heart 

Rate 1: 80 bpm      Height: 5'3"        Respiratory Rate: 20 bpm Temperature: 36

.8 (C) / 

98.3 (F)                                Weight: 212 lbs 

 

                2014      Blood Pressure 1: 116/74 Code: 8480-6 Heart Rate

 1: 68 bpm 

Respiratory Rate: 20 bpm  Temperature: 36.2 (C) / 97.1 (F) Weight: 212 lbs 

 

                10/15/2013      Blood Pressure 1: 132/82 Code: 8480-6 BMI: 37.4 

Code: 81033-1 Heart 

Rate 1: 76 bpm      Height: 5'3"        Respiratory Rate: 20 bpm Temperature: 36

.7 (C) / 

98.0 (F)                                Weight: 211 lbs 

 

                    2013          Blood Pressure 1: 130/76 Code: 8480-6 He

art Rate 1: 78 bpm

 

                2013      Blood Pressure 1: 140/82 Code: 8480-6 BMI: 36.8 

Code: 31087-9 Heart 

Rate 1: 66 bpm      Height: 5'3"        Respiratory Rate: 20 bpm Temperature: 36

.1 (C) / 

96.9 (F)                                Weight: 208 lbs 

 

                2013      Blood Pressure 1: 138/80 Code: 8480-6 BMI: 36.4 

Code: 65708-8 Heart 

Rate 1: 72 bpm      Height: 5'4"        Respiratory Rate: 20 bpm Temperature: 36

.7 (C) / 

98.0 (F)                                Weight: 212 lbs 

 

                2013      Blood Pressure 1: 124/70 Code: 8480-6 BMI: 36.9 

Code: 60851-0 Heart 

Rate 1: 60 bpm      Height: 5'4"        Temperature: 36.1 (C) / 97.0 (F) Weight:

 215 lbs 

 

                05/10/2013      Blood Pressure 1: 132/86 Code: 8480-6 BMI: 36.9 

Code: 78854-8 Heart 

Rate 1: 76 bpm      Height: 5'4"        Respiratory Rate: 20 bpm Temperature: 36

.8 (C) / 

98.2 (F)                                Weight: 215 lbs 

 

                2013      Blood Pressure 1: 134/82 Code: 8480-6 BMI: 36.6 

Code: 95983-6 Heart 

Rate 1: 72 bpm      Height: 5'4"        Respiratory Rate: 20 bpm Temperature: 36

.3 (C) / 

97.4 (F)                                Weight: 213 lbs 

 

                2012      Blood Pressure 1: 142/80 Code: 8480-6 BMI: 37.1 

Code: 17837-5 Heart 

Rate 1: 76 bpm      Height: 5'4"        Respiratory Rate: 20 bpm Temperature: 36

.8 (C) / 

98.3 (F)                                Weight: 216 lbs 

 

                10/23/2012      Blood Pressure 1: 128/68 Code: 8480-6 BMI: 37.6 

Code: 76269-8 Heart 

Rate 1: 72 bpm      Height: 5'4"        Respiratory Rate: 20 bpm Temperature: 36

.6 (C) / 

97.9 (F)                                Weight: 219 lbs 

 

                10/10/2012      Blood Pressure 1: 122/70 Code: 8480-6 Heart Rate

 1: 76 bpm 

Respiratory Rate: 20 bpm  Temperature: 36.7 (C) / 98.1 (F) Weight: 218 lbs 

 

                    2012          Blood Pressure 1: 132/80 Code: 8480-6 He

art Rate 1: 84 bpm

 

                2012      Blood Pressure 1: 128/78 Code: 8480-6 BMI: 38.1 

Code: 49105-8 Heart 

Rate 1: 84 bpm      Height: 5'4"        Respiratory Rate: 20 bpm Temperature: 36

.9 (C) / 

98.4 (F)                                Weight: 222 lbs 

 

                2012      Blood Pressure 1: 138/80 Code: 8480-6 BMI: 37.9 

Code: 22947-0 Heart 

Rate 1: 74 bpm      Height: 5'4"        Temperature: 36.1 (C) / 97.0 (F) Weight:

 221 lbs 

 

                2012      Blood Pressure 1: 126/78 Code: 8480-6 BMI: 38.4 

Code: 82427-5 Heart 

Rate 1: 72 bpm      Height: 5'4"        Respiratory Rate: 20 bpm Temperature: 36

.7 (C) / 

98.0 (F)                                Weight: 224 lbs 

 

                2012      Blood Pressure 1: 138/72 Code: 8480-6 BMI: 38.4 

Code: 49325-4 Heart 

Rate 1: 72 bpm      Height: 5'4"        Respiratory Rate: 20 bpm Temperature: 36

.6 (C) / 

97.9 (F)                                Weight: 224 lbs 

 

                2012      Blood Pressure 1: 122/78 Code: 8480-6 BMI: 38.8 

Code: 11305-1 Heart 

Rate 1: 88 bpm      Height: 5'4"        Respiratory Rate: 20 bpm Temperature: 36

.6 (C) / 

97.8 (F)                                Weight: 226 lbs 

 

                2012      Blood Pressure 1: 116/60 Code: 8480-6 BMI: 38.1 

Code: 63602-4 Heart 

Rate 1: 92 bpm      Height: 5'4"        Respiratory Rate: 20 bpm Temperature: 36

.8 (C) / 

98.2 (F)                                Weight: 222 lbs 

 

                2011      Blood Pressure 1: 118/62 Code: 8480-6 BMI: 37.9 

Code: 62704-1 Heart 

Rate 1: 80 bpm      Height: 5'4"        Temperature: 36.5 (C) / 97.7 (F) Weight:

 221 lbs 

 

                2011      Blood Pressure 1: 132/70 Code: 8480-6 Heart Rate

 1: 84 bpm 

Respiratory Rate: 20 bpm  Temperature: 36.7 (C) / 98.0 (F) Weight: 221 lbs 

 

                2011      Blood Pressure 1: 114/72 Code: 8480-6 BMI: 37.6 

Code: 67971-5 Heart 

Rate 1: 76 bpm      Height: 5'4"        Respiratory Rate: 20 bpm Temperature: 36

.8 (C) / 

98.3 (F)                                Weight: 219 lbs 

 

                    2011          Blood Pressure 1: 136/76 Code: 8480-6 Te

mperature: 36.0 (C) / 96.8 

(F)                                     Weight: 219 lbs 8 oz

 

                2011      Blood Pressure 1: 128/80 Code: 8480-6 Heart Rate

 1: 72 bpm 

Temperature: 36.3 (C) / 97.4 (F)        Weight: 218 lbs 

 

                2011      Blood Pressure 1: 126/70 Code: 8480-6 Heart Rate

 1: 72 bpm 

Temperature: 36.7 (C) / 98.0 (F)

 

                    2010          Blood Pressure 1: 126/78 Code: 8480-6 Te

mperature: 36.2 (C) / 97.1 

(F)                                     Weight: 217 lbs 8 oz

 

                2010      Blood Pressure 1: 116/70 Code: 8480-6 Heart Rate

 1: 72 bpm 

Temperature: 36.4 (C) / 97.6 (F)        Weight: 222 lbs 

 

                2010      Blood Pressure 1: 114/78 Code: 8480-6 Heart Rate

 1: 72 bpm 

Temperature: 37.1 (C) / 98.8 (F)        Weight: 225 lbs 

 

                2010      Blood Pressure 1: 128/78 Code: 8480-6 Heart Rate

 1: 76 bpm 

Temperature: 36.6 (C) / 97.8 (F)        Weight: 224 lbs 

 

                2010      Blood Pressure 1: 116/78 Code: 8480-6 Heart Rate

 1: 80 bpm 

Temperature: 36.4 (C) / 97.6 (F)        Weight: 226 lbs 

 

                2010      Blood Pressure 1: 120/70 Code: 8480-6 BMI: 38.3 

Code: 62301-1 Heart 

Rate 1: 88 bpm      Height: 5'5"        Temperature: 36.4 (C) / 97.6 (F) Weight:

 230 lbs 







Functional Status

No Functional Status data



Reason For Visit





                    Reason For Visit    Effective Dates     Notes

 

                    follow up           2020           

 

                    follow up           2020           

 

                    blood pressure check 2020           

 

                    high blood pressure 2020           

 

                    lab draw            2019           

 

                    lab draw            10/11/2019           

 

                    hearing loss        2019          left ear

 

                    follow up           06/10/2019           

 

                    follow up           2019          Patient has upcoming

 appointment with Dr Claire and carotid 

dopplers tomorrow

 

                    follow up           2018          Patient had heart ca

th on 10-30-18 and one of the bypass 

veins in spasming

 

                    follow up           2018          4 Week

 

                    follow up           10/02/2018           

 

                    follow up           2018           

 

                    high blood pressure 2018          Dr Claire has ordered

 holter monitor and 

hydralazine 50mg PRN

 

                    dizziness           2018          On  morning pa

sammint woke up and went to the bathroom 

and after lying down became very dizzy. Patient has hx of vertigo so didn't 
think anything of it. She usually just has them intermittently, but had more 
that day. While at [facility] began to feel very ill and became very shaky. She got 
home and sat in the recliner and had the jittery/nervous feeling. Later that 
night it finally resolved. Patient feels like she had a spell like this around 
the  and thought it was due to extreme heat exhaustion.

 

                    follow up           2018           

 

                    back pain           2018           

 

                    otalgia             2018           

 

                    back pain           2018           

 

                    injection(s)        10/06/2017          flu shot

 

                    lab draw            2017           

 

                    follow up           2017           

 

                    pelvic pain         2017          Patient states pain 

started in May with a "Constant Ache"

to left inguinal area. Patient states at that time pain was intermittent. Then, 
approximately 2nd week  of  pain worsened and radiates to left low back 
area.

 

                    lab draw            2017           

 

                    hyperglycemia       2017           

 

                    cough               2017           

 

                    otalgia             2016           

 

                    injection(s)        10/07/2016          Influenza

 

                    lab draw            2016           

 

                    constipation        2016           

 

                    flank pain          2016           

 

                    follow up           2015          3mo fwup

 

                    injection(s)        10/16/2015          Influenza

 

                    acute renal failure 09/15/2015           

 

                    lab draw            09/10/2015           

 

                    fatigue             2015           

 

                    otalgia             2015           

 

                    pain, limb          2015           

 

                    follow up           2015          Hospital fwup

 

                    high blood pressure 2015           

 

                    injection(s)        10/17/2014          flu shot

 

                    sinusitis           2014           

 

                    high blood pressure 2014           

 

                    cough               2014          Was panfilo at Dr Tyree teixeira's office so he started he on amlodipine 

10mg but seems to be dragging her down. Patient decreased to 1/2 tablet this 
last week

 

                    lab draw            2014           

 

                    cough               2014           

 

                    cough               10/15/2013           

 

                    high blood pressure 2013           

 

                    follow up           2013          ER

 

                    dizziness           2013           

 

                    follow up           2013           

 

                    otalgia             05/10/2013           

 

                    breast complaint    2013           

 

                    lab draw            2012           

 

                    follow up           2012          2mo fwup

 

                    follow up           10/23/2012          2wk fwup

 

                    gas and bloating    10/10/2012          Dr Claire has her mon

itoring because was high in his 

office at last visit

 

                    injection(s)        2012           

 

                    dizziness           2012           

 

                    dizziness           2012           

 

                    lab draw            2012           

 

                    muscle weakness     2012          concerns of simvasta

tin with fatigue and muscle 

weakness

 

                    leg pain/sciatica   2012           

 

                    hip pain            2012           

 

                    back pain           2012          has tried ice pack, 

muscle relaxers, and moist heat

 

                    back pain           2012           

 

                    fatigue             2012          in hands/feet

 

                    otalgia             2011           

 

                    follow up           2011          2wk fwup

 

                    back pain           2011          thinks ulcer may hav

e returned

 

                    lab draw            2011           

 

                    dizziness           2011          Left ear and facial 

pain, right ear pain 

 

                    follow up           2011          1wk fwup

 

                    hip pain            2011          feels very draggy, f

atigue, BP 80/63, normally running 

100's/60's

 

                    chills              2010           

 

                    injection(s)        10/06/2010          flu shot

 

                    follow up           2010          6wk fwup, had HH rep

aired and is starting to feel better, 

started on reglan 10mg tid

 

                    follow up           2010          hosp fwup

 

                    follow up           2010          1mo fwup, saw Dr Danna du and hasn't gave cardiac clearance 

yet for HH repair, did have chemical stress test yesterday and waiting on 
results

 

                    follow up           2010          from EGD/colonoscopy

--has Hiatal Hernia and wants to do 

repair

 

                    follow up           2010           







Encounters





             Encounter    Performer    Location     Codes        Date

 

                                        ([id number]) OFFICE/OUTPATIENT VISIT EST

Diagnosis: Essential (primary) hypertension[ICD10: I10]

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

 

                                        ([id number]) OFFICE/OUTPATIENT VISIT EST

Diagnosis: Essential (primary) hypertension[ICD10: I10]

Diagnosis: Palpitations[ICD10: R00.2] Akanksha GUNN 

[employer]                    CPT-4: 81120              2020

 

                                        (11703) OFFICE/OUTPATIENT VISIT EST

Diagnosis: Essential hypertension[ICD10: I10]

Diagnosis: Palpitations[ICD10: R00.2]

Diagnosis: Stress reaction[ICD10: F43.0] [employer]anksha [employer]            CPT-4: 66235              2020

 

                                        (03153) NURSE/OUTPATIENT VISIT EST

Diagnosis: Mixed hyperlipidemia[ICD10: E78.2] Akanksha DRIVER DO [facility]            CPT-4: 58718              2019

 

                                        (51084) NURSE/OUTPATIENT VISIT EST

Diagnosis: FLU VACCINE[ICD10: Z23] Akanksha KEY DO 

[facility]                       CPT-4: 59919              10/22/2019

 

                                        (91954) NURSE/OUTPATIENT VISIT EST

Diagnosis: Chronic kidney disease, stage 1[ICD10: N18.1] Akanksha DRIVER DO [facility] CPT-4: 01765              10/11/2019

 

                                        (55497) OFFICE/OUTPATIENT VISIT EST

Diagnosis: Acute serous otitis media, left ear[ICD10: H65.02] Nat DRIVER DO [facility] CPT-4: 50629              2019

 

                                        (88433) OFFICE/OUTPATIENT VISIT EST

Diagnosis: Essential (primary) hypertension[ICD10: I10]

Diagnosis: Coronary atherosclerosis due to calcified coronary lesion[ICD10: 
I25.84]

Diagnosis: Hypoglycemia, unspecified[ICD10: E16.2]

Diagnosis: Other fatigue[ICD10: R53.83]

Diagnosis: Urinary tract infection, site not specified[ICD10: N39.0] Akanksha DRIVER DO [facility] CPT-4: 02256        06/10/2019

 

                                        (82760) OFFICE/OUTPATIENT VISIT EST

Diagnosis: Essential (primary) hypertension[ICD10: I10]

Diagnosis: Gastro-esophageal reflux disease without esophagitis[ICD10: K21.9]

Diagnosis: Urinary tract infection, site not specified[ICD10: N39.0] Akanksha DRIVER DO [facility] CPT-4: 56550        2019

 

                                        (30883) OFFICE/OUTPATIENT VISIT EST

Diagnosis: Gastro-esophageal reflux disease without esophagitis[ICD10: K21.9]

Diagnosis: Epigastric pain[ICD10: R10.13] Akanksha DRIVER [employer]            CPT-4: 82219              2018

 

                                        (04516) OFFICE/OUTPATIENT VISIT EST

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

Diagnosis: Essential (primary) hypertension[ICD10: I10]

Diagnosis: Generalized anxiety disorder[ICD10: F41.1]

Diagnosis: Gastro-esophageal reflux disease without esophagitis[ICD10: K21.9] 

Akanksha DRIVER [employer] [facility] CPT-4: 13911        2018

 

                                        OFFICE/OUTPATIENT VISIT EST

Diagnosis: Gastro-esophageal reflux disease without esophagitis[ICD10: K21.9]

Diagnosis: Palpitations[ICD10: R00.2]

Diagnosis: Urinary tract infection, site not specified[ICD10: N39.0]

Diagnosis: Generalized anxiety disorder[ICD10: F41.1] Akanksha SPENCER [employer] CPT-4: 19445              10/02/2018

 

                                        (04564) NURSE/OUTPATIENT VISIT EST

Diagnosis: Coronary atherosclerosis due to calcified coronary lesion[ICD10: 
I25.84]

Diagnosis: Hypoglycemia, unspecified[ICD10: E16.2]

Diagnosis: Dizziness and giddiness[ICD10: R42]

Diagnosis: Occlusion and stenosis of bilateral carotid arteries[ICD10: I65.23] 

Akanksha DRIVER [employer] [facility] CPT-4: 68085        2018

 

                                        OFFICE/OUTPATIENT VISIT EST

Diagnosis: Epigastric pain[ICD10: R10.13]

Diagnosis: Generalized anxiety disorder[ICD10: F41.1]

Diagnosis: FLU VACCINE[ICD10: Z23] Akanksha SPENCER

 [employer] 

[facility]                       CPT-4: 18007              2018

 

                                        (21443) OFFICE/OUTPATIENT VISIT EST

Diagnosis: Essential (primary) hypertension[ICD10: I10]

Diagnosis: Hypoglycemia, unspecified[ICD10: E16.2]

Diagnosis: Gastro-esophageal reflux disease without esophagitis[ICD10: K21.9] 

Akanksha DRIVER [facility] CPT-4: 79519        2018

 

                                        (16918) OFFICE/OUTPATIENT VISIT EST

Diagnosis: Dizziness and giddiness[ICD10: R42] Nat DRIVER [facility]            CPT-4: 55106              2018

 

                                        (65740) OFFICE/OUTPATIENT VISIT EST

Diagnosis: Cervicalgia[ICD10: M54.2]

Diagnosis: Other spondylosis with radiculopathy, cervical region[ICD10: M47.22] 

Akanksha DRIVER [facility] CPT-4: 56034        2018

 

                                        (91064) OFFICE/OUTPATIENT VISIT EST

Diagnosis: Hypoglycemia, unspecified[ICD10: E16.2]

Diagnosis: Other spondylosis with radiculopathy, cervical region[ICD10: M47.22]

Diagnosis: Vertigo of central origin, bilateral[ICD10: H81.43]

Diagnosis: Cervicocranial syndrome[ICD10: M53.0] [facility]         [facility]: 14159              2018

 

                                        (84594) OFFICE/OUTPATIENT VISIT EST

Diagnosis: Benign paroxysmal vertigo, bilateral[ICD10: H81.13]

Diagnosis: Otalgia, bilateral[ICD10: H92.03] Nat DRIVER [facility]            CPT-4: 78454              2018

 

                                        (50249) OFFICE/OUTPATIENT VISIT EST

Diagnosis: Low back pain[ICD10: M54.5]

Diagnosis: Radiculopathy, lumbosacral region[ICD10: M54.17]

Diagnosis: Left lower quadrant pain[ICD10: R10.32] Akanksha Xiangndangela IZQUIERDOTAMMY

OLIVIA DRIVER [employer]         CPT-4: 80299              2018

 

                                        (65015) OFFICE/OUTPATIENT VISIT EST

Diagnosis: FLU VACCINE[ICD10: Z23] Akanksha Otoolendangela SPENCER

[facility]                       CPT-4: 68962              10/06/2017

 

                                        (82974) OFFICE/OUTPATIENT VISIT EST

Diagnosis: Mixed hyperlipidemia[ICD10: E78.2]

Diagnosis: Essential (primary) hypertension[ICD10: I10]

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

Diagnosis: Impaired fasting glucose[ICD10: R73.01]

Diagnosis: Other fatigue[ICD10: R53.83] Akanksha DRIVER

[facility]                    CPT-4: 90372              2017

 

                                        (04182) OFFICE/OUTPATIENT VISIT EST

Diagnosis: Pain in thoracic spine[ICD10: M54.6]

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 

Akanksha DRIVER [facility] CPT-4: 95036        2017

 

                                        (89970) OFFICE/OUTPATIENT VISIT EST

Diagnosis: Left lower quadrant pain[ICD10: R10.32]

Diagnosis: Low back pain[ICD10: M54.5] Akanksha SYKES 

[facility]                    CPT-4: 64955              2017

 

                                        (59717) OFFICE/OUTPATIENT VISIT EST

Diagnosis: Mixed hyperlipidemia[ICD10: E78.2]

Diagnosis: Essential (primary) hypertension[ICD10: I10]

Diagnosis: Hypoglycemia, unspecified[ICD10: E16.2] Akanksha DRIVER [facility]         CPT-4: 40357              2017

 

                                        (84404) OFFICE/OUTPATIENT VISIT EST

Diagnosis: Impaired fasting glucose[ICD10: R73.01]

Diagnosis: Mastodynia[ICD10: N64.4]

Diagnosis: Mixed hyperlipidemia[ICD10: E78.2]

Diagnosis: Essential (primary) hypertension[ICD10: I10]

Diagnosis: Other fatigue[ICD10: R53.83] Akanksha DRIVER

[facility]                    CPT-4: 41152              2017

 

                                        (50933) OFFICE/OUTPATIENT VISIT EST

Diagnosis: Acute upper respiratory infection, unspecified[ICD10: J06.9] Luba Stevenson              AKANKSHA DRIVER [facility] CPT-4: 06515        2017

 

                                        (42395) OFFICE/OUTPATIENT VISIT EST

Diagnosis: Acute mastoiditis without complications, right ear[ICD10: H70.001] 

Akanksha Villa DRIVER [facility] CPT-4: 14646        2016

 

                                        (51710) OFFICE/OUTPATIENT VISIT EST

Diagnosis: FLU VACCINE[ICD10: Z23] Akanksha KEY [facility]                       CPT-4: 32213              10/07/2016

 

                                        (65177) OFFICE/OUTPATIENT VISIT EST

Diagnosis: Mixed hyperlipidemia[ICD10: E78.2]

Diagnosis: Essential (primary) hypertension[ICD10: I10]

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

Diagnosis: Impaired fasting glucose[ICD10: R73.01] Akanksha IZQUIERDOTAMMY

[employer] DRIVER DO [facility]         CPT-4: 21257              2016

 

                                        ([id number]) OFFICE/OUTPATIENT VISIT EST

Diagnosis: Constipation, unspecified[ICD10: K59.00] Akanksha DRIVER DO [facility] CPT-4: 05683              2016

 

                                        (41118) OFFICE/OUTPATIENT VISIT EST

Diagnosis: Essential (primary) hypertension[ICD10: I10]

Diagnosis: Mixed hyperlipidemia[ICD10: E78.2]

Diagnosis: Chronic kidney disease, stage 1[ICD10: N18.1] Akanksha DRIVER [facility] CPT-4: 29083              2016

 

                                        (11540) OFFICE/OUTPATIENT VISIT EST

Diagnosis: Muscle weakness (generalized)[ICD10: M62.81]

Diagnosis: Dizziness and giddiness[ICD10: R42]

Diagnosis: Essential (primary) hypertension[ICD10: I10]

Diagnosis: History of falling[ICD10: Z91.81] Akankshatammy KEVINRAMOS DRIVER [facility]            CPT-4: 08112              2015

 

                                        (66397) OFFICE/OUTPATIENT VISIT EST

Diagnosis: FLU VACCINE[ICD10: Z23] Akanksha Otoolerodo        AKANKSHA OLIVIA KEY [facility]                       CPT-4: 57953              10/16/2015

 

                                        (51243) OFFICE/OUTPATIENT VISIT EST

Diagnosis: Acute renal failure[ICD9: 584.9]

Diagnosis: POLYURIA[ICD9: 788.42] Akankshatammy KEVINQUELINE SAramis GUERRERO LANCE [facility]                       CPT-4: 40430              09/15/2015

 

                                        (77322) OFFICE/OUTPATIENT VISIT EST

Diagnosis: HYPERLIPIDEMIA NEC/NOS[ICD9: 272.4]

Diagnosis: HYPERTENSION[ICD9: 401.9]

Diagnosis: CAD[ICD9: 414.00]

Diagnosis: Hyperglycemia[ICD9: 790.29]

Diagnosis: MALAISE AND FATIGUE[ICD9: 780.79] Akanksha DRIVER [employer]            CPT-4: 60501              09/10/2015

 

                                        (37075) OFFICE/OUTPATIENT VISIT EST

Diagnosis: MALAISE AND FATIGUE[ICD9: 780.79]

Diagnosis: HYPOGLYCEMIA[ICD9: 251.2]

Diagnosis: Grieving[ICD9: 309.0]

Diagnosis: ABDOMINAL PAIN[ICD9: 789.00] Akanksha DRIVER

[facility]                    CPT-4: 92259              2015

 

                                        OFFICE/OUTPATIENT VISIT EST

Diagnosis: CERUMEN IMPACTION[ICD9: 380.4]

Diagnosis: EUSTACHIAN TUBE DYSFUNCTION[ICD9: 381.81] Bertha Elieser      

AKANKSHA DRIVER [facility] CPT-4: 94009              2015

 

                                        (14273) OFFICE/OUTPATIENT VISIT EST

Diagnosis: Leg pain[ICD9: 729.5]

Diagnosis: SUPERFIC PHLEBITIS-LEG[ICD9: 451.0] Akanksha DRIVER [facility]            CPT-4: 35925              2015

 

                                        (61776) OFFICE/OUTPATIENT VISIT EST

Diagnosis: Thoracic back pain[ICD9: 724.1]

Diagnosis: SPASM OF MUSCLE[ICD9: 728.85] Akanksha DRIVER [facility]            CPT-4: 26920              2015

 

                                        (90714) OFFICE/OUTPATIENT VISIT EST

Diagnosis: DIZZINESS/VERTIGO[ICD9: 780.4]

Diagnosis: Benign positional vertigo[ICD9: 386.11]

Diagnosis: HYPERTENSION[ICD9: 401.9]

Diagnosis: Suspicious nevus[ICD9: 238.2] Akanksha DRIVER [facility]            CPT-4: 29289              2015

 

                                        (79496) OFFICE/OUTPATIENT VISIT EST

Diagnosis: FLU VACCINE[ICD10: Z23] Akanksha SPENCER

[facility]                       CPT-4: 99176              10/17/2014

 

                                        OFFICE/OUTPATIENT VISIT EST

Diagnosis: SINUSITIS, ACUTE[ICD9: 461.9]

Diagnosis: EUSTACHIAN TUBE DYSFUNCTION[ICD9: 381.81] [facility] CPT[facility]: 28616              2014

 

                                        (88780) OFFICE/OUTPATIENT VISIT EST

Diagnosis: DIZZINESS/VERTIGO[ICD9: 780.4]

Diagnosis: HYPERTENSION[ICD9: 401.9] Akanksha OTOOLE

[facility]                       CPT-4: 89339              2014

 

                                        (90478) OFFICE/OUTPATIENT VISIT EST

Diagnosis: HYPERTENSION[ICD9: 401.9]

Diagnosis: MALAISE AND FATIGUE[ICD9: 780.79]

Diagnosis: SINUSITIS, ACUTE[ICD9: 461.9] [facility]            CPT-[facility]: 23829              2014

 

                                        (58097) OFFICE/OUTPATIENT VISIT EST

Diagnosis: HYPERLIPIDEMIA NEC/NOS[ICD9: 272.4]

Diagnosis: HYPERTENSION[ICD9: 401.9]

Diagnosis: B12 DEFIC ANEMIA NEC[ICD9: 281.1]

Diagnosis: HYPOGLYCEMIA[ICD9: 251.2] [facility]                       CPT-[facility]: 24777              2014

 

                                        OFFICE/OUTPATIENT VISIT EST

Diagnosis: COUGH[ICD9: 786.2] [facility] 

CPT-4: 92044                            2014

 

                                        OFFICE/OUTPATIENT VISIT EST

Diagnosis: COUGH[ICD9: 786.2]

Diagnosis: URI, ACUTE[ICD9: 465.9] Bertha SPENCER

[facility]                       CPT-4: 33193              10/15/2013

 

                                        (94570) OFFICE/OUTPATIENT VISIT EST

Diagnosis: HYPERTENSION[ICD9: 401.9]

Diagnosis: CEPHALGIA[ICD9: 784.0]

Diagnosis: ANXIETY STATE NOS[ICD9: 300.00]

Diagnosis: FLU VACCINE[ICD9: V04.81] Akanksha ROTHMANR [facility]                       CPT-4: 09373              2013

 

                                        (60120) OFFICE/OUTPATIENT VISIT EST

Diagnosis: DIZZINESS/VERTIGO[ICD9: 780.4]

Diagnosis: SINUSITIS, ACUTE[ICD9: 461.9]

Diagnosis: Benign positional vertigo[ICD9: 386.11] Akanksha DRIVER [facility]         CPT-4: 43406              2013

 

                                        OFFICE/OUTPATIENT VISIT EST

Diagnosis: OTITIS MEDIA NOS[ICD9: 382.9] Akanksha DRIVER [facility]            CPT-4: 94367              2013

 

                                        OFFICE/OUTPATIENT VISIT EST

Diagnosis: Perforation of ear drum[ICD9: 384.20]

Diagnosis: SINUSITIS, ACUTE[ICD9: 461.9] Akanksha DRIVER [facility]            CPT-4: 28585              05/10/2013

 

                                        (63437) OFFICE/OUTPATIENT VISIT EST

Diagnosis: Mastalgia[ICD9: 611.71] Akanksha SPENCER

[facility]                       CPT-4: 10296              2013

 

                                        (67839) OFFICE/OUTPATIENT VISIT EST

Diagnosis: HYPERLIPIDEMIA NEC/NOS[ICD9: 272.4]

Diagnosis: HYPERTENSION[ICD9: 401.9]

Diagnosis: DIZZINESS/VERTIGO[ICD9: 780.4]

Diagnosis: Hyperglycemia[ICD9: 790.29]

Diagnosis: MALAISE AND FATIGUE[ICD9: 780.79] [facility]            CPT-4: 33338              2012

 

                                        (07473) OFFICE/OUTPATIENT VISIT EST

Diagnosis: EUSTACHIAN TUBE DYSFUNCTION[ICD9: 381.81]

Diagnosis: DIZZINESS/VERTIGO[ICD9: 780.4]

Diagnosis: ABDOMINAL PAIN[ICD9: 789.00]

Diagnosis: GERD[ICD9: 530.81]

Diagnosis: CERUMEN IMPACTION[ICD9: 380.4] [employer]ha [employer]            CPT-4: 09754              2012

 

                                        OFFICE/OUTPATIENT VISIT EST

Diagnosis: ABDOMINAL PAIN[ICD9: 789.00]

Diagnosis: DYSPEPSIA[ICD9: 536.8] Akanksha LANCE [facility]                       CPT-4: 36240              10/23/2012

 

                                        (85454) OFFICE/OUTPATIENT VISIT EST

Diagnosis: ABDOMINAL PAIN[ICD9: 789.00]

Diagnosis: DYSPEPSIA[ICD9: 536.8]

Diagnosis: IBS[ICD9: 564.1] Akanksha DRIVER [facility] CPT

-

4: 30502                                10/10/2012

 

                                        OFFICE/OUTPATIENT VISIT EST

Diagnosis: DIZZINESS/VERTIGO[ICD9: 780.4]

Diagnosis: HYPOGLYCEMIA[ICD9: 251.2] Akanksha MEJIA [facility]                       CPT-4: 09622              2012

 

                                        (39981) OFFICE/OUTPATIENT VISIT EST

Diagnosis: URINARY TRACT INFECTION[ICD9: 599.0] Akanksha DRIVER [facility]            CPT-4: 07203              2012

 

                                        (48548) OFFICE/OUTPATIENT VISIT EST

Diagnosis: HYPERLIPIDEMIA NEC/NOS[ICD9: 272.4]

Diagnosis: HYPERTENSION[ICD9: 401.9]

Diagnosis: MALAISE AND FATIGUE[ICD9: 780.79]

Diagnosis: DIZZINESS/VERTIGO[ICD9: 780.4] Akanksha [employer]            CPT-4: 83076              2012

 

                                        (63806) OFFICE/OUTPATIENT VISIT EST

Diagnosis: DIZZINESS/VERTIGO[ICD9: 780.4]

Diagnosis: MALAISE AND FATIGUE[ICD9: 780.79]

Diagnosis: HYPERTENSION[ICD9: 401.9] Akanksha MEJIA [facility]                       CPT-4: 29634              2012

 

                                        OFFICE/OUTPATIENT VISIT EST

Diagnosis: LUMB/LUMBOSAC DISC DEGEN[ICD9: 722.52]

Diagnosis: Radiculopathy of leg[ICD9: 724.4]

Diagnosis: SACROILIITIS NEC[ICD9: 720.2]

Diagnosis: SPINAL ENTHESOPATHY[ICD9: 720.1] Akanksha DRIVER [facility]            CPT-4: 61717              2012

 

                                        (35407) OFFICE/OUTPATIENT VISIT EST

Diagnosis: PAIN, LOWER BACK[ICD9: 724.2]

Diagnosis: SPASM OF MUSCLE[ICD9: 728.85]

Diagnosis: SCIATICA[ICD9: 724.3]

Diagnosis: Lumbar degenerative disc disease[ICD9: 722.52] Akanksha DRIVER [facility] CPT-4: 41264              2012

 

                                        (88730) OFFICE/OUTPATIENT VISIT EST

Diagnosis: PAIN IN THORACIC SPINE[ICD9: 724.1]

Diagnosis: SPASM OF MUSCLE[ICD9: 728.85] Akanksha DRIVER [facility]            CPT-4: 39319              2012

 

                                        (63378) OFFICE/OUTPATIENT VISIT EST

Diagnosis: PAIN, LOWER BACK[ICD9: 724.2]

Diagnosis: SPASM OF MUSCLE[ICD9: 728.85]

Diagnosis: Sacroiliac dysfunction[ICD9: 739.4]

Diagnosis: Lumbar degenerative disc disease[ICD9: 722.52] Akanksha DRIVER [facility] CPT-4: 50476              2012

 

                                        OFFICE/OUTPATIENT VISIT EST

Diagnosis: MALAISE AND FATIGUE[ICD9: 780.79]

Diagnosis: HYPOTENSION[ICD9: 458.9]

Diagnosis: CAD[ICD9: 414.00] Akanksha DRIVER [facility] 

CPT-4: 24386                            2012

 

                                        OFFICE/OUTPATIENT VISIT EST

Diagnosis: SINUSITIS, ACUTE[ICD9: 461.9]

Diagnosis: PHARYNGITIS, ACUTE[ICD9: 462]

Diagnosis: CERUMEN IMPACTION[ICD9: 380.4] Akanksha [employer]            CPT-4: 82991              2011

 

                                        OFFICE/OUTPATIENT VISIT EST

Diagnosis: PAIN IN THORACIC SPINE[ICD9: 724.1]

Diagnosis: GERD[ICD9: 530.81]

Diagnosis: DIZZINESS/VERTIGO[ICD9: 780.4] Akanksha [employer] [facility]            CPT-4: 02376              2011

 

                OFFICE/OUTPATIENT VISIT EST Akanksha EMJIA [facility] CPT-

4: 73520                                2011

 

                    ([id number]) OFFICE/OUTPATIENT VISIT EST Akanksha CASTILLO S. [facility] 

[facility]                       CPT-4: 99774              2011

 

                                        (61216) OFFICE/OUTPATIENT VISIT, EST

Diagnosis: PAIN, LOWER BACK[ICD9: 724.2] Akanksha BAUMAN S.

 

[facility]            CPT-4: 12553              2011

 

                    (60342) OFFICE/OUTPATIENT VISIT, EST Akanksha DE LA ROSA S. [facility]

[facility]                       CPT-4: 36988              2011

 

                    (87583) OFFICE/OUTPATIENT VISIT, EST Akanksha DE LA ROSA [facility]

[facility]                       CPT-4: 17112              2010

 

                    ([id number]) OFFICE/OUTPATIENT VISIT, EST Akanksha DE LA ROSA [facility]

[facility]                       CPT-4: 55453              2010

 

                    (54612) OFFICE/OUTPATIENT VISIT, EST Akanksha DE LA ROSA S[facility]

[facility]                       CPT-4: 88861              2010

 

                    (68966) OFFICE/OUTPATIENT VISIT, EST Akanksha DE LA ROSA S[facility]

[facility]                       CPT-4: 56207              2010

 

                    (35432) OFFICE/OUTPATIENT VISIT, EST Akanksha DE LA ROSA S. [facility]

[facility]                       CPT-4: 39848              2010

 

                    ([id number]) OFFICE/OUTPATIENT VISIT, EST Akanksha DE LA ROSA S. [facility]

[facility]                       CPT-4: 43081              2010







Plan of Care





             Planned Activity Notes        Codes        Status       Date

 

                          Visit Diagnosis Plan: Generalized anxiety disorder Dis

cussion: Stable

                                        ICD-9 : 300.00

ICD-10 : F41.1

                                                    2020

 

                          Visit Diagnosis Plan: Essential (primary) hypertension

 Discussion: Stable

Follow Up: 1 months

                                        ICD-9 : 401.9

ICD-10 : I10

                                                    2020

 

                          Patient Education: metoprolol tartrate- [employer] [facility] [id number] 

[email or web address]

                                        Completed           2020

 

                          Visit Diagnosis Plan: Palpitations Discussion: Continu

e higher dose of 

metoprolol

                                        ICD-9 : 785.1

ICD-10 : R00.2

                                                    2020

 

                          Visit Diagnosis Plan: Essential (primary) hypertension

 Discussion: Improving 

with higher dose of metoprolol Recheck 2weeks

                                        ICD-9 : 401.9

ICD-10 : I10

                                                    2020

 

                                        Appointment: Akanksha Drivertel:[phone]

                                        [address] [place] [address]                                              FOLLOW UP       2020

 

                                        Appointment: Akanksha Drivertel:[phone]

                                        [address] [place] [address]              2020--moved up to Tues 20 at 4pm (km)                 [place]

NCELED        2020

 

                          Visit Diagnosis Plan: Essential hypertension Discussio

n: Increase metoprolol to 

25mg q AM and 50mg po q pM Recheck 1 week

                                        ICD-9 : 401.9

ICD-10 : I10

                                                    2020

 

                          Visit Diagnosis Plan: Palpitations Discussion: Check C

BC, CMP, TSH

                                        ICD-9 : 785.1

ICD-10 : R00.2

                                                    2020

 

                                        Appointment: Akanksha Drivertel:[phone]

                                        [address] [place] [address]                                              BP CHECK        2020

 

                                        Appointment: Akanksha Drivertel:[phone]

                                        [address] [place] [address]                                              ACUTE ILLNESS   2020

 

                          Patient Education: metoprolol tartrate- [employer] [facility] [id number] 

https://www.[employer].com/samplemd/resources/getResource/61/[email or web address]

                                        Completed           2020

 

                    Care Plan: X-RAY EXAM NECK SPINE 4/5VWS                     

LOINC : 39306-3

                          Pending                   2020

 

                    Care Plan: X-RAY EXAM THORAC SPINE4/>VW                     

LOINC : 01632-7

                          Pending                   2020

 

                                        Appointment: Akanksha Driverl:[phone]

                                        [address] [place], [place] [id number]

[place]                                              LAB             2019

 

                                        Appointment: Akanksha Driver

WPtel:[phone]

                                        [address] [place], [place] [id number]

[place]                                              INJECTION       10/22/2019

 

             Patient Education: INFLUENZA VACCINE CDC                           

Completed    10/22/2019

 

                                        Appointment: Akanksha Driver

WPtel:[phone]

                                        [address] [place], [place] [address]                                              LAB             10/11/2019

 

                          Visit Diagnosis Plan: Acute serous otitis media, left 

ear Discussion: instructed

to use flonase and claritin daily for symptom relief. discussed with patient 
that if no improvement in 2 weeks, will need to follow up with ENT for audiology
exam. instructed to call office with any other symptoms such as otalgia, 
dysphagia, fever, etc.

                                        ICD-9 : 381.01

ICD-10 : H65.02

                                                    2019

 

                                        Appointment: Nat Lozoya

                                        [address] [place] [address]                                              ACUTE ILLNESS   2019

 

                          Visit Diagnosis Plan: Essential (primary) hypertension

 Discussion: Stable Check 

CMP

                                        ICD-9 : 401.9

ICD-10 : I10

                                                    06/10/2019

 

                          Visit Diagnosis Plan: Other fatigue Discussion: Check 

CBC, TSH

                                        ICD-9 : 780.79

ICD-10 : R53.83

                                                    06/10/2019

 

                          Visit Diagnosis Plan: Hypoglycemia, unspecified Discus

madeleine: Monitor BS At least 

6 small meals a day each with protein

                                        ICD-9 : 251.2

ICD-10 : E16.2

                                                    06/10/2019

 

                          Visit Diagnosis Plan: Urinary tract infection, site no

t specified Discussion: 

Started an old abx prescription

                                        ICD-9 : 599.0

ICD-10 : N39.0

                                                    06/10/2019

 

                                        Appointment: Akanksha Driver

WPtel:[phone]

                                        [address] [place] [address]                                              FOLLOW UP       06/10/2019

 

                          Visit Diagnosis Plan: Urinary tract infection, site no

t specified Discussion: 

Macrobid 100mg po BID for 1 week

                                        ICD-9 : 599.0

ICD-10 : N39.0

                                                    2019

 

                          Visit Diagnosis Plan: Gastro-esophageal reflux disease

 without esophagitis 

Discussion: Stable on omeprazole

Follow Up: 3 months

                                        ICD-9 : 530.81

ICD-10 : K21.9

                                                    2019

 

                          Visit Diagnosis Plan: Essential (primary) hypertension

 Discussion: Stable Sees 

Dr. Clements this month

                                        ICD-9 : 401.9

ICD-10 : I10

                                                    2019

 

                                        Appointment: Akanksha Driver

WPtel:[phone]

                                        [address] [place], [place] [id number]

[place]                                              FOLLOW UP       2019

 

                          Patient Education: metoprolol tartrate- [employer] [facility] [id number] 

https://www.[employer]/resources/getResource/61/[email or web address]-30

                                        Completed           2019

 

                                        Appointment: Akanksha Driver

WPtel:[phone]

                                        [address] [place], [place] [address]

[place]                                              CANCELED        02/15/2019

 

                          Visit Diagnosis Plan: Gastro-esophageal reflux disease

 without esophagitis 

Discussion: Continue protonix and carafate Proceed with updated EGD

                                        ICD-9 : 530.81

ICD-10 : K21.9

                                                    2018

 

                          Visit Diagnosis Plan: Epigastric pain Discussion: [facility]

k CT abdomen/pelvis due to

ongoing abdominal pain

                                        ICD-9 : 789.06

ICD-10 : R10.13

                                                    2018

 

                                        Appointment: Akanksha Driver

WPtel:[phone]

                                        [address] [place], [place] [address]

[place]                                              FOLLOW UP       2018

 

                    Care Plan: CT PELVIS W/O DYE                     LOINC : 361

08-9

                          Pending                   2018

 

                    Care Plan: CT ABDOMEN W/O DYE                     LOINC : 36

103-0

                          Pending                   2018

 

                    Care Plan: Referral Order                     SNOMED-CT : 30

[id number]

                          Pending                   2018

 

                                        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

art disease of native coronary artery 

without angina pectoris                 Discussion: S/P cardiac cath--doing medi

vicki management 

with imdur and metoprolol increased Has fwup with cardiology next week Discussed
cardiac rehab

                                        ICD-9 : 414.00

ICD-10 : I25.10

                                                    2018

 

                          Visit Diagnosis Plan: Essential (primary) hypertension

 Discussion: Stable

                                        ICD-9 : 401.9

ICD-10 : I10

                                                    2018

 

                          Visit Diagnosis Plan: Gastro-esophageal reflux disease

 without esophagitis 

Discussion: Continue protonix at BID dosing and carafate BID

Follow Up: 2 months

                                        ICD-9 : 530.81

ICD-10 : K21.9

                                                    2018

 

                          Visit Diagnosis Plan: Generalized anxiety disorder Dis

cussion: Stable

                                        ICD-9 : 300.00

ICD-10 : F41.1

                                                    2018

 

                                        Appointment: Akanksha Drivertel:[phone]

                                        [address] [place], [place] [id number]

                                              FOLLOW UP       2018

 

                          Visit Diagnosis Plan: Gastro-esophageal reflux disease

 without esophagitis 

Discussion: Continue protonix at BID dosing Continue carafate at q AC and HS 
dosing for 2 more weeks then decrease to BID dosing

Follow Up: 4 weeks

                                        ICD-9 : 530.81

ICD-10 : K21.9

                                                    10/02/2018

 

                          Visit Diagnosis Plan: Generalized anxiety disorder Dis

cussion: Increase xanax to

BID dosing especially with upcoming cardiac testing which is already making 
patient more anxious and worried

                                        ICD-9 : 300.00

ICD-10 : F41.1

                                                    10/02/2018

 

                          Visit Diagnosis Plan: Palpitations Discussion: Cardilo

logy going to schedule 

Lexiscan

                                        ICD-9 : 785.1

ICD-10 : R00.2

                                                    10/02/2018

 

                          Visit Diagnosis Plan: Urinary tract infection, site no

t specified Discussion: 

Reculture urine

                                        ICD-9 : 599.0

ICD-10 : N39.0

                                                    10/02/2018

 

                                        Appointment: Akanksha Drivertel:[phone]

                                        [address] [place], [place] [address]

                                              FOLLOW UP       10/02/2018

 

             Patient Education: Patient Medication Summary                      

     Completed    10/02/2018

 

                                        Appointment: Akanksha Drivertel:[phone]

                                        [address] [place], [place] [id number]

                                              LAB             2018

 

             Patient Education: Patient Medication Summary                      

     Completed    2018

 

                          Visit Diagnosis Plan: Generalized anxiety disorder Dis

cussion: Did discuss 

increased risk of benzodiazepines causing dementia but at this time will stay at
xanax 0.25mg po BID

                                        ICD-9 : 300.00

ICD-10 : F41.1

                                                    2018

 

                          Visit Diagnosis Plan: Epigastric pain Discussion: Cont

inue protonix and carafate

but make into a slurry Flu shot given Discussed EGD if symptoms persist

Follow Up: 2 weeks

                                        ICD-9 : 789.06

ICD-10 : R10.13

                                                    2018

 

                                        Appointment: Akanksha Driver

WPtel:[phone]

                                        [address] [place] [address]                                              FOLLOW UP       2018

 

             Patient Education: Patient Medication Summary                      

     Completed    2018

 

                          Visit Diagnosis Plan: Gastro-esophageal reflux disease

 without esophagitis 

Discussion: Change to protonix 40mg po BID

Follow Up: 1 months

                                        ICD-9 : 530.81

ICD-10 : K21.9

                                                    2018

 

                          Visit Diagnosis Plan: Essential (primary) hypertension

 Discussion: Has 

hydralazine to use prn per cardiology Going to do 30 day holter monitor

                                        ICD-9 : 401.9

ICD-10 : I10

                                                    2018

 

                          Visit Diagnosis Plan: Hypoglycemia, unspecified Discus

madeleine: 6 small meals a 

day--each with protein Increase fluid intake

                                        ICD-9 : 251.2

ICD-10 : E16.2

                                                    2018

 

                                        Appointment: Akanksha Driver

WPtel:[phone]

                                        [address] [place] [address]                                              ACUTE ILLNESS   2018

 

             Patient Education: Patient Medication Summary                      

     Completed    2018

 

                          Visit Diagnosis Plan: Dizziness and giddiness Discussi

on: blood work to be 

completed in office including cmp, cbc, troponin to rule out glucose 
intolerance, infection, dehydration. instructed patient that she needs to see 
her cardiologist sooner than oct and patient requested we contact dr. clements's 
office. will have front office make appt. patient has carotid doppler annually.

                                        ICD-9 : 780.4

ICD-10 : R42

                                                    2018

 

                                        Appointment: Nat Lozoya

                                        [address] [place] [address]                                              ACUTE ILLNESS   2018

 

             Patient Education: Patient Medication Summary                      

     Completed    2018

 

                          Visit Diagnosis Plan: Cervicalgia Discussion: Complete

 course of PT since 

symptoms improved Continue stretching exercises Notify if symptoms return or 
worsen

                                        ICD-9 : 723.1

ICD-10 : M54.2

                                                    2018

 

                                        Appointment: Akanksha Driver

WPtel:[phone]

                                        [address] [place] [address]                                              FOLLOW UP       2018

 

             Patient Education: Patient Medication Summary                      

     Completed    2018

 

                          Visit Diagnosis Plan: Vertigo of central origin, bilat

eral Discussion: Discussed

PT for vestibular therapy

                                        ICD-9 : 386.2

ICD-10 : H81.43

                                                    2018

 

                          Visit Diagnosis Plan: Other spondylosis with radiculop

athy, cervical region 

Discussion: Check MRI of cervical spine

                                        ICD-9 : 721.0

ICD-10 : M47.22

                                                    2018

 

                          Visit Diagnosis Plan: Hypoglycemia, unspecified Discus

madleeine: Eat something with 

protein every 2 hrs

                                        ICD-9 : 251.2

ICD-10 : E16.2

                                                    2018

 

                                        Appointment: Akanksha Driver

WPtel:[phone] [address]                                                              2018

 

             Patient Education: Patient Medication Summary                      

     Completed    2018

 

                    Care Plan: MRI NECK SPINE W/O DYE                     LOINC 

: 58278-7

                          Pending                   2018

 

                          Visit Diagnosis Plan: Benign paroxysmal vertigo, bilat

eral Discussion: patient 

has meclizine at home but states it makes her too tired. instructed patient to 
cut in half and take at night. if it doesn't cause too much drowsiness, 
instructed to take bid until feeling better. instructed to rtc wed if no 
improvement or worsening symptoms. instructed patient to increase fluid intake 
as well.

                                        ICD-9 : 386.11

ICD-10 : H81.13

                                                    2018

 

                          Visit Diagnosis Plan: Otalgia, bilateral Discussion: k

enalog 40 mg given to 

patient im. instructed patient to monitor blood sugar at home due to risk of 
increased sugar. instructed patient to rtc on wednesday if no improvement and 
may require antibiotic.

                                        ICD-9 : 388.70

ICD-10 : H92.03

                                                    2018

 

                                        Appointment: Nat Lozoya

                                        [address] [place], [place] [id number]                                              ACUTE ILLNESS   2018

 

             Patient Education: Patient Medication Summary                      

     Completed    2018

 

                          Visit Diagnosis Plan: Low back pain Discussion: Stretc

hes Topical aspercreme 

Tylenol 1 po TID

                                        ICD-9 : 724.2

ICD-10 : M54.5

                                                    2018

 

                          Visit Diagnosis Plan: Radiculopathy, lumbosacral regio

n Discussion: As above

                                        ICD-9 : 724.4

ICD-10 : M54.17

                                                    2018

 

                          Visit Diagnosis Plan: Left lower quadrant pain Discuss

ion: Culture urine Notify 

if worsens

                                        ICD-9 : 789.04

ICD-10 : R10.32

                                                    2018

 

                                        Appointment: Akanksha Driver

WPtel:[phone]

                                        [address] [place], [place] [id number]                                              ACUTE ILLNESS   2018

 

             Patient Education: Patient Medication Summary                      

     Completed    2018

 

                                        Appointment: Akanksha Driver

WPtel:[phone]

                                        [address] [place], [place] [id number]

[place]                                              INJECTION       10/06/2017

 

             Patient Education: Patient Medication Summary                      

     Completed    10/06/2017

 

                                        Appointment: Akanksha Driver

WPtel:[phone]

                                        [address] [place], [place] [id number]

                                              LAB             2017

 

             Patient Education: Patient Medication Summary                      

     Completed    2017

 

                          Visit Diagnosis Plan: Other intervertebral disc degene

ration, lumbar region 

Follow Up: 3 months

                                        ICD-9 : 722.52

ICD-10 : M51.36

                                                    2017

 

                          Visit Diagnosis Plan: Pain in thoracic spine Discussio

n: PT has helped Continue 

stretches and walking [facility]ed [facility]

                                        ICD-9 : 724.1

ICD-10 : M54.6

                                                    2017

 

                                        Appointment: Akanksha Driver

WPtel:[phone]

                                        [address] [place], [place] [id number]

                                              FOLLOW UP       2017

 

             Patient Education: Patient Medication Summary                      

     Completed    2017

 

                          Visit Diagnosis Plan: Left lower quadrant pain Discuss

ion: Had CT scan of 

abdomen/pelvis in 2017 and normal colonoscopy in 2015

                                        ICD-9 : 789.04

ICD-10 : R10.32

                                                    2017

 

                          Visit Diagnosis Plan: Low back pain Discussion: Start 

PT for low back- Seems 

like abdominal pain is radiating from low back

Follow Up: 5 weeks

                                        ICD-9 : 724.2

ICD-10 : M54.5

                                                    2017

 

                                        Appointment: Akanksha Driver

WPtel:[phone]

                                        [address] [place], [place] [address]                                              ACUTE ILLNESS   2017

 

             Patient Education: Patient Medication Summary                      

     Completed    2017

 

                                        Appointment: Akanksha Driver

WPtel:[phone]

                                        [address] [place] [address]                                              LAB             2017

 

             Patient Education: Patient Medication Summary                      

     Completed    2017

 

                          Visit Diagnosis Plan: Mixed hyperlipidemia Discussion:

 Check lipids

                                        ICD-9 : 272.4

ICD-10 : E78.2

                                                    2017

 

                          Visit Diagnosis Plan: Mastodynia Discussion: Check Jace

ateral diagnostic 

mammogram with US

                                        ICD-9 : 611.71

ICD-10 : N64.4

                                                    2017

 

                          Visit Diagnosis Plan: Impaired fasting glucose Discuss

ion: Return in AM for CMP,

HbA1C Accuchecks daily Diet/Exercise discussed at length

                                        ICD-9 : 790.29

ICD-10 : R73.01

                                                    2017

 

                          Visit Diagnosis Plan: Other fatigue Discussion: Check 

CBC, TSH

                                        ICD-9 : 780.79

ICD-10 : R53.83

                                                    2017

 

                                        Appointment: Akanksha Driver

WPtel:[phone]

                                        [address] [place], [place] [id number]              3/7 confirmed `sl                 ACUTE ILLNESS   2017

 

             Patient Education: Patient Medication Summary                      

     Completed    2017

 

                    Care Plan: MAMMOGRAM SCREENING                     LOINC : 2

6347-5

                          Pending                   2017

 

                          Visit Diagnosis Plan: Acute upper respiratory infectio

n, unspecified Discussion:

Exam is reassuring Likely viral illness Supportive care reviewed and encouraged 
Follow up PRN

                                        ICD-9 : 465.9

ICD-10 : J06.9

                                                    2017

 

                                        Appointment: Luba Stevenson 

                                        [address] [place], [place] [address]                                              ACUTE ILLNESS   2017

 

             Patient Education: Patient Medication Summary                      

     Completed    2017

 

                          Visit Plan:               Supportive care. Rest, Fluid

s, Tylenol/Motrin prn fever or 

bodyaches. Notify if worsening symptoms.

                                                            2016

 

                                        Appointment: Akanksha Driver

WPtel:[phone]

                                        [address] [place] [address]                                              ACUTE ILLNESS   2016

 

             Patient Education: Patient Medication Summary                      

     Completed    2016

 

                                        Appointment: Akanksha Driver

WPtel:[phone]

                                        [address] [place], [place] [address]

[place]                                              INJECTION       10/07/2016

 

             Patient Education: Patient Medication Summary                      

     Completed    10/07/2016

 

                                        Appointment: kAanksha Driver

WPtel:[phone]

                                        [address] [place], [place] [id number]

US                                              LAB             2016

 

             Patient Education: Patient Medication Summary                      

     Completed    2016

 

                          Visit Plan:               Add miralax daily Use daily 

probiotic and metamucil and push fluids

Notify if worsens/persists

                                                            2016

 

                                        Appointment: Akanksha Driver

WPtel:[phone]

                                        [address] [place] [address]              16 confirmed ~sl                 ACUTE ILLNESS   2016

 

             Patient Education: Patient Medication Summary                      

     Completed    2016

 

                          Visit Plan:               No NSAIDs Kidney function di

scussed Discussed hydration Monitor lab

every 4months so will check CMP, HbA1C next month

                                                            2016

 

                                        Appointment: Akanksha Driver

WPtel:[phone]

                                        [address] [place] [address]              03/15 confirmed ~sl                 ACUTE ILLNESS   2016

 

             Patient Education: Patient Medication Summary                      

     Completed    2016

 

                          Visit Plan:               Vestibular exercises Refill 

flonase Strict low Na diet--discussed 

that needs to read labels Rx for walker with chair given due to muscle 
weakness/unsteadiness Has carotids and abdominal aorta and legs checked in 
January Check Chem 7

                                                            2015

 

                                        Appointment: Akanksha Driver

WPtel:[phone]

                                        [address] [place] [address]              12/15/15 appt confirmed cn                 FOLLOW UP       

 

             Patient Education: Patient Medication Summary                      

     Completed    2015

 

             Patient Education: Vertigo                           Completed    1

2015

 

                                        Appointment: Akanksha Driver

WPtel:[phone]

                                        [address] [place], [place] [id number]

[place]                                              INJECTION       10/16/2015

 

             Patient Education: Patient Medication Summary                      

     Completed    10/16/2015

 

                                        Appointment: Akanksha Driver

WPtel:[phone]

                                        [address] [place], [place] [id number]

[place]                                              UA              09/15/2015

 

             Patient Education: Patient Medication Summary                      

     Completed    09/15/2015

 

                                        Referral: Landon De La Torre

WPtel:[phone]

#1 [facility] Uma Vásquez

[id number]

[place]              Referral                        Completed       2015

 

                                        Appointment: Akanksha Driver

WPtel:[phone]

                                        [address] [place] [address]                                              LAB             09/10/2015

 

             Patient Education: Patient Medication Summary                      

     Completed    09/10/2015

 

                          Visit Plan:               Check CMP, CBC, TSH, Free T4

, B12, Lipids, HbA1C Discussed 6 small 

meals a day each with protein Would likely benefit from antidepressant Update 
colonoscopy

                                                            2015

 

                                        Appointment: Akanksha Driver

WPtel:[phone]

                                        [address] [place] [address]              9/8/15 confirmed                 ACUTE ILLNESS   2015

 

             Patient Education: Patient Medication Summary                      

     Completed    2015

 

                          Visit Plan:               Cerumen flush with warm wate

r and peroxide - good results Resume 

Nasonex nasal spray daily Recommended Debrox earwax removal drops

                                                            2015

 

                                        Appointment: Bertha Mon

WPtel:[phone]

                                        [address] [place] [address]                                              ACUTE ILLNESS   2015

 

             Patient Education: Patient Medication Summary                      

     Completed    2015

 

                          Visit Plan:               Continue aspirin daily Add m

eloxicam for 1week Elevate and Ice Call

on 2015

 

                                        Appointment: Akanksha Driver

WPtel:[phone]

                                        [address] [place] [address]                                              ACUTE ILLNESS   2015

 

             Patient Education: Patient Medication Summary                      

     Completed    2015

 

                          Visit Plan:               Been using baclofen at [facility] and has helped some PT for next 

2-4weeks

                                                            2015

 

                                        Appointment: Akanksha Driver

WPtel:[phone]

                                        [address] [place], [place] [id number] [place] [address] Follow Up 2015

 

             Patient Education: Patient Medication Summary                      

     Completed    2015

 

                                        Appointment: Akanksha Driver

WPtel:[phone]

                                        [address] [place], [place] [id number]                                              LAB             2015

 

                                        Appointment: Akanksha Driver

WPtel:[phone]

                                        [address] [place], [place] [id number]              feeling better, weather -                 FOLLOW UP       

 

                                        Referral: Landon De La Torre

WPtel:[phone]

#1 [facility] [place]

Henri LEE

[id number]

[place]              Referral                        Appointment Requested 2015

 

                          Visit Plan:               Continue exercise and curren

t meds See surgery for removal of right

arm lesion

                                                            2015

 

                                        Appointment: Akanksha Driver

WPtel:[phone]

                                        [address] [place], [place] [id number]                                              FOLLOW UP       2015

 

             Patient Education: Patient Medication Summary                      

     Completed    2015

 

                                        Appointment: Akanksha Driver

WPtel:[phone]

                                        [address] [place], [place] [id number]                                              INJECTION       10/17/2014

 

             Patient Education: Patient Medication Summary                      

     Completed    10/17/2014

 

                                        Appointment: Bertha Mon

WPtel:[phone]

                                        [address] [place], [place] [id number]                                              ACUTE ILLNESS   2014

 

             Patient Education: Patient Medication Summary                      

     Completed    2014

 

                          Visit Plan:               Discussed that likely lumbar

 etiology for leg weakness Will change 

amlodopine to low dose dyazide and see if helps legs and inner ear

                                                            2014

 

                                        Appointment: Akanksha Driver

WPtel:[phone]

                                        [address] [place], [place] [id number]

                                              FOLLOW UP       2014

 

             Patient Education: Patient Medication Summary                      

     Completed    2014

 

                          Visit Plan:               Ceftin and continue claritin

/flonase Decrease amlodopine to 2.5mg q

HS until fwup with Card due to weakness

                                                            2014

 

                                        Appointment: Akanksha Driver

WPtel:[phone]

                                        [address] [place] [address]                                              ACUTE ILLNESS   2014

 

             Patient Education: Patient Medication Summary                      

     Completed    2014

 

                                        Appointment: Akanksha Driver

WPtel:[phone]

                                        [address] [place] [address]                                              LAB             2014

 

             Patient Education: Patient Medication Summary                      

     Completed    2014

 

                                        Appointment: Bertha Mon

WPtel:[phone]

                                        [address] [place] [address]                                              ACUTE ILLNESS   2014

 

             Patient Education: Patient Medication Summary                      

     Completed    2014

 

                          Visit Plan:               Supportive care. Rest, Fluid

s, Tylenol prn fever or bodyaches. 

Notify if worsening symptoms. Ceftin

                                                            10/15/2013

 

                                        Appointment: Bertha Mon

WPtel:[phone]

                                        [address] [place] [address]                                              ACUTE ILLNESS   10/15/2013

 

             Patient Education: Patient Medication Summary                      

     Completed    10/15/2013

 

                                        Appointment: Akanksha Driver

WPtel:[phone]

                                        [address] [place] [address]                                              BP CHECK        2013

 

             Patient Education: Patient Medication Summary                      

     Completed    2013

 

                                        Appointment: Akanksha Driver

WPtel:[phone]

                                        [address] [place] [address]                                              ER Follow UP    2013

 

             Patient Education: Patient Medication Summary                      

     Completed    2013

 

                          Visit Plan:               Restart flonase BID Use mecl

izine 25mg q HS Vestibular exercises 

Claritin 10mg q AM See ENT if doesn't resolve

                                                            2013

 

                                        Appointment: Akanksha Driver

WPtel:[phone]

                                        [address] [place] [address]                                              ACUTE ILLNESS   2013

 

             Patient Education: Patient Medication Summary                      

     Completed    2013

 

                          Visit Plan:               Will continue to observe

                                                            2013

 

                                        Appointment: Akanksha Driver

WPtel:[phone]

                                        [address] [place] [address]                                              FOLLOW UP       2013

 

             Patient Education: Patient Medication Summary                      

     Completed    2013

 

                          Visit Plan:               ERx for Augmentin 875mg q12 

x 10 days and Floxin otic drops 5 gtts 

AD x7days Sample of Nasonex per pt request Discussed treatment (nasal saline, 
salt water gargles, keeping right ear clean and dry, for worsening go to [facility] on 
weekend, Tylenol/ibuprofen, etc.) RTC 2 weeks for ear recheck.

                                                            05/10/2013

 

                                        Appointment: Jill Jean 

WPtel:[phone]

                                        [address] [place] [address]                                              ACUTE ILLNESS   05/10/2013

 

             Patient Education: Patient Medication Summary                      

     Completed    05/10/2013

 

                          Visit Plan:               Decrease caffeine intake Marina

ck Bilateral Mammogram with US of left 

breast

                                                            2013

 

                                        Appointment: Akanksha Driver

WPtel:[phone]

                                        [address] [place] [address]                                              ACUTE ILLNESS   2013

 

             Patient Education: Patient Medication Summary                      

     Completed    2013

 

                                        Appointment: Kate Hall

WPtel:[phone]

                                        [address] [place] [address]              appt scheduled                  ACUTE ILLNESS   2013

 

                                        Appointment: Akanksha Driver

WPtel:[phone]

                                        [address] [place] [address]

[place]                                              LAB             2012

 

             Patient Education: Patient Medication Summary                      

     Completed    2012

 

                          Visit Plan:               Continue off carafate and se

e how does Continue probiotic Add 

Vestibular exercises and use meclizine prn Continue Nasonex Check fasting lab 
including CMP, Lipids, CBC, TSH, Free T4, HbA1C

                                                            2012

 

                                        Appointment: Akanksha Driver

WPtel:[phone]

                                        [address] [place] [address]                                              FOLLOW UP       2012

 

             Patient Education: Patient Medication Summary                      

     Completed    2012

 

                          Visit Plan:               Continue carafate for 4more 

weeks at current dose then decrease to 

BID for 2wks then q HS

                                                            10/23/2012

 

                                        Appointment: Akanksha Drivertel:[phone]

                                        [address] [place] [address]                                              FOLLOW UP       10/23/2012

 

             Patient Education: Patient Medication Summary                      

     Completed    10/23/2012

 

                          Visit Plan:               Continue omeprazole Add Ellyn

fate 1gm po q AC Add daily probiotic

                                                            10/10/2012

 

                                        Appointment: Akanksha Driver

WPtel:[phone]

                                        [address] [place] [address]                                              ACUTE ILLNESS   10/10/2012

 

             Patient Education: Patient Medication Summary                      

     Completed    10/10/2012

 

                                        Appointment: Akanksha Drivertel:[phone]

                                        [address] [place] [address]

[place]                                              INJECTION       2012

 

             Patient Education: Patient Medication Summary                      

     Completed    2012

 

                          Visit Plan:               Diabetic Diet Accuchecks BID

 alternating times Hold onglyza and 

Januvia for now and continue diet and exercise and weight loss and moniter BS 
Discussed hypoglycemia and snack of peanut butter and crackers with juice or 
milk

                                                            2012

 

                                        Appointment: Akanksha rDivertel:[phone]

                                        [address] [place] [address]                                              WORK IN         2012

 

             Patient Education: Patient Medication Summary                      

     Completed    2012

 

                                        Appointment: Akanksha Drivertel:[phone]

                                        [address] [place], [place] [id number]                                              UA              2012

 

             Patient Education: Patient Medication Summary                      

     Completed    2012

 

                                        Appointment: Akanksha Drivertel:[phone]

                                        [address] [place], [place] [address]                                              LAB             2012

 

             Patient Education: Patient Medication Summary                      

     Completed    2012

 

                                        Appointment: Akanksha Drivertel:[phone]

                                        [address] [place], [place] [address]                                              ACUTE ILLNESS   2012

 

             Patient Education: Patient Medication Summary                      

     Completed    2012

 

                          Visit Plan:               Injection to Right SI joint 

as above Pt will call in 3 days on pain

Has PT starting in 2wks.

                                                            2012

 

                                        Appointment: Akanksha Drivertel:[phone]

                                        [address] [place] [address]                                              ACUTE ILLNESS   2012

 

             Patient Education: Patient Medication Summary                      

     Completed    2012

 

                          Visit Plan:               OMT done Start PT May need u

pdated MRI if need to consider epidural

Prednisone

                                                            2012

 

                                        Appointment: Akanksha Driver

WPtel:[phone]

                                        [address] [place] [address]                                              OMT             2012

 

             Patient Education: Patient Medication Summary                      

     Completed    2012

 

                          Visit Plan:               Flexeril and Vimovo OMT done

 Daily stretches

                                                            2012

 

                                        Appointment: Akanksha Driver

WPtel:[phone]

                                        [address] [place] [address]                                              ACUTE ILLNESS   2012

 

             Patient Education: Patient Medication Summary                      

     Completed    2012

 

                          Visit Plan:               OMT done Daily stretches Vinod

st heat or biofreeze Right SI joint 

injection Call in 1week Vimovo BID

                                                            2012

 

                                        Appointment: Akanksha Drivertel:[phone]

                                        [address] [place] [address]                                              ACUTE ILLNESS   2012

 

             Patient Education: Patient Medication Summary                      

     Completed    2012

 

                                        Appointment: Akanksha Drivertel:[phone]

                                        [address] [place] [address]

[place]                                              LAB             2012

 

             Patient Education: Patient Medication Summary                      

     Completed    2012

 

                          Visit Plan:               Decrease amlodopine to 1.25m

g daily Schedule with Cardiology 

Fasting lab in AM

                                                            2012

 

                                        Appointment: Akanksha Drivertel:[phone]

                                        [address] [place] [address]                                              ACUTE ILLNESS   2012

 

             Patient Education: Patient Medication Summary                      

     Completed    2012

 

                          Visit Plan:               Saline nasal flushes prn. Ty

lenol/Motrin prn headache. Notify if 

persists/symptoms worsening. Cerumen removal from ears as above

                                                            2011

 

                                        Appointment: Akanksha Driver

WPtel:[phone]

                                        [address] [place] [address]                                              ACUTE ILLNESS   2011

 

             Patient Education: Patient Medication Summary                      

     Completed    2011

 

                                        Appointment: Akanksha Driver

WPtel:[phone]

                                        [address] [place] [address]

[place]                                              INJECTION       10/05/2011

 

             Patient Education: Patient Medication Summary                      

     Completed    10/05/2011

 

                          Visit Plan:               Continue vimovo for 1more we

ek Increase Omeprazole to BID for 1mo 

then resume QD if stomach improved Vestibular exercises with meclizine q HS for 
next week

                                                            2011

 

                                        Appointment: Akanksha Driver

WPtel:[phone]

                                        [address] [place] [address]                                              FOLLOW UP       2011

 

             Patient Education: Patient Medication Summary                      

     Completed    2011

 

                                        Appointment: Akanksha Driver

WPtel:[phone]

                                        [address] [place] [address]                                              ACUTE ILLNESS   2011

 

             Patient Education: Patient Medication Summary                      

     Completed    2011

 

                                        Appointment: Akanksha Driver

WPtel:[phone]

                                        [address] [place] [address]                                              LAB             2011

 

             Patient Education: Patient Medication Summary                      

     Completed    2011

 

                          Visit Plan:               Saline nasal flushes prn. Ty

lenol/Motrin prn headache. Notify if 

persists/symptoms worsening. Add Veramyst Increase Omeprazole to 20mg po BID

                                                            2011

 

                                        Appointment: Akanksha Driver

WPtel:[phone]

                                        [address] [place] [address]                                              ACUTE ILLNESS   2011

 

             Patient Education: Patient Medication Summary                      

     Completed    2011

 

                                        Appointment: Akanksha Driver

WPtel:[phone]

                                        [address] [place] [address]                                              FOLLOW UP       2011

 

             Patient Education: Patient Medication Summary                      

     Completed    2011

 

                                        Appointment: Akanksha Driver

WPtel:[phone]

                                        [address] [place] [address]                                              ACUTE ILLNESS   2011

 

             Patient Education: Patient Medication Summary                      

     Completed    2011

 

                          Visit Plan:               Nasocourt sample given. Pt. 

will notify if symptoms are worse on 

Monday.

                                                            2010

 

                                        Appointment: Kate Hall

WPtel:[phone]

                                        [address] [place] [address]                                              ACUTE ILLNESS   2010

 

             Patient Education: Patient Medication Summary                      

     Completed    2010

 

                                        Appointment: Akanksha Driver

WPtel:[phone]

                                        [address] [place] [address]

[place]                                              INJECTION       10/06/2010

 

             Patient Education: Patient Medication Summary                      

     Completed    10/06/2010

 

                                        Appointment: Akanksha Driver

WPtel:[phone]

                                        [address] [place] [address]                                              FOLLOW UP       2010

 

             Patient Education: Patient Medication Summary                      

     Completed    2010

 

             Patient Education: Lexapro                           Completed    0

2010

 

                          Visit Plan:               May proceed with hiatal mykel

ia repair per Card. so will contact Dr. Cash's office to proceed with surgery Trial of Lexapro 5mg QD plus use 
xanax prn

                                                            2010

 

                                        Appointment: Akanksha Driver

WPtel:[phone]

                                        [address] [place] [address]                                              FOLLOW UP       2010

 

             Patient Education: Patient Medication Summary                      

     Completed    2010

 

                          Visit Plan:               B12 given Cont oral B12 and 

iron Fwup 1mo for B12 Proceed with 

hiatal hernia repair once card clearance

                                                            2010

 

                                        Appointment: Akanksha Driver

WPtel:[phone]

                                        [address] [place] [address]                                              FOLLOW UP       2010

 

             Patient Education: Patient Medication Summary                      

     Completed    2010

 

                                        Appointment: Akanksha Driver

WPtel:[phone]

                                        [address] [place] [address]                                              FOLLOW UP       2010

 

             Patient Education: Patient Medication Summary                      

     Completed    2010

 

                                        Appointment: Akanksha Driver

WPtel:[phone]

                                        [address] [facility]

[place]                                              LAB             2010

 

             Patient Education: Patient Medication Summary                      

     Completed    2010

 

                          Visit Plan:               Check fasting lab in AM--CMP

,Lipids, CBC, Vit D, TSH,FreeT4, B12

                                                            2010

 

                                        Appointment: Akanksha Driver

WPtel:[phone]

                                        [address] [facility]

                                              FOLLOW UP       2010

 

             Patient Education: Patient Medication Summary                      

     Completed    2010

 

                                        Referral: Landon De La Torre

WPtel:[phone]

[address] [facility]

              Referral                        Appointment Requested  

 

                                        Referral: Landon De La Torre

WPtel:[phone]

[address] [facility]

              Referral                        Initiated        







Instructions





                                        Comment

 

                                        . Supportive care.  Rest, Fluids, Tyleno

l/Motrin prn fever or bodyaches.  Notify

if worsening symptoms.



 

                                        . Add miralax daily

Use daily probiotic and metamucil and push fluids

Notify if worsens/persists

 

                                        . No NSAIDs

Kidney function discussed

Discussed hydration 

Monitor lab every 4months so will check CMP, HbA1C next month

 

                                        . Vestibular exercises

Refill flonase

Strict low Na diet--discussed that needs to read labels

Rx for walker with chair given due to muscle weakness/unsteadiness

Has carotids and abdominal aorta and legs checked in January

Check Chem 7



 

                                        . Check CMP, CBC, TSH, Free T4, B12, Lip

ids, HbA1C

Discussed 6 small meals a day each with protein

Would likely benefit from antidepressant 

Update colonoscopy

 

                                        . Cerumen flush with warm water and tyler

xide - good results 

Resume Nasonex nasal spray daily

Recommended Debrox earwax removal drops 

 

                                        . Continue aspirin daily

Add meloxicam for 1week

Elevate and Ice

Call on Monday

 

                                        . Been using baclofen at bedtime and has

 helped some

PT for next 2-4weeks



 

                                        . Continue exercise and current meds

See surgery for removal of right arm lesion

 

                                        . Discussed that likely lumbar etiology 

for leg weakness

Will change amlodopine to low dose dyazide and see if helps legs and inner ear

 

                                        . Ceftin and continue claritin/flonase

Decrease amlodopine to 2.5mg q HS until fwup with Card due to weakness

 

                                        .  Supportive care.  Rest, Fluids, Tylen

ol prn fever or bodyaches.  Notify if 

worsening symptoms.

Ceftin

 

                                        . Restart flonase BID

Use meclizine 25mg q HS

Vestibular exercises

Claritin 10mg q AM

See ENT if doesn't resolve

 

                                        . Will continue to observe



 

                                        . ERx for Augmentin 875mg q12 x 10 days 

and Floxin otic drops 5 gtts AD x7days

Sample of Nasonex per pt request

Discussed treatment (nasal saline, salt water gargles, keeping right ear clean 
and dry, for worsening go to [facility] on weekend, Tylenol/ibuprofen, etc.)

RTC 2 weeks for ear recheck.

 

                                        . Decrease caffeine intake

Check Bilateral Mammogram with US of left breast

 

                                        Left ear flushed with warm water and per

oxide with ear syringe and then last 

removed with currette--peroxide drops instilled.  Tolerated well, no 
complications, TM intact.. Continue off carafate and see how does

Continue probiotic

Add Vestibular exercises and use meclizine prn

Continue Nasonex

Check fasting lab including CMP, Lipids, CBC, TSH, Free T4, HbA1C

 

                                        . Continue carafate for 4more weeks at c

urrent dose then decrease to BID for 

2wks then q HS

 

                                        . Continue omeprazole

Add Carafate 1gm po q AC

Add daily probiotic



 

                                        . Diabetic Diet

Accuchecks BID alternating times

Hold onglyza and Januvia for now and continue diet and exercise and weight loss 
and moniter BS

Discussed hypoglycemia and snack of peanut butter and crackers with juice or 
milk

 

                                        Informed Consent obtainded.  Right SI yasir

int cleansed with alcohol and betadine 

and injected with 3cc 1%l lidocaine with 40mg depomedrol and 40mg kenalog, 
tolerated well with no complications, neosporin and bandage applied. Injection 
to Right SI joint as above

Pt will call in 3 days on pain

Has PT starting in 2wks.

 

                                        . OMT done

Start PT

May need updated MRI if need to consider epidural

Prednisone

 

                                        . Flexeril and Vimovo

OMT done

Daily stretches

 

                                        Right SI joint cleansed with alchohol an

d betadine and injected with 3cc 1% 

lidocaine with 40mg depomedrol and 40mg kenalog, tolerated well with no 
complications, neosporin and bandage applied. OMT done

Daily stretches

Moist heat or biofreeze

Right SI joint injection

Call in 1week

Vimovo BID

 

                                        . Decrease amlodopine to 1.25mg daily

Schedule with Cardiology

Fasting lab in AM

 

                                        .  Saline nasal flushes prn. Tylenol/Mot

rin prn headache.  Notify if 

persists/symptoms worsening.

Cerumen removal from ears as above



 

                                        . Continue vimovo for 1more week

Increase Omeprazole to BID for 1mo then resume QD if stomach improved

Vestibular exercises with meclizine q HS for next week

 

                                        . Saline nasal flushes prn. Tylenol/Motr

in prn headache.  Notify if 

persists/symptoms worsening.

Add Veramyst

Increase Omeprazole to 20mg po BID

 

                                        . Nasocourt sample given.  Pt. will noti

fy if symptoms are worse on Monday. 

 

                                        . May proceed with hiatal hernia repair 

per Card. so will contact Dr. Cash's office to proceed with surgery



Trial of Lexapro 5mg QD plus use xanax prn

 

                                        . B12 given

Cont oral B12 and iron

Fwup 1mo for B12

Proceed with hiatal hernia repair once card clearance

 

                                        . Check fasting lab in AM--CMP,Lipids, C

BC, Vit D, TSH,FreeT4, B12







Medical Equipment

No Medical Equipment data



Health Concerns Section

Health Concerns data not found



Goals Section

Goals data not found



Interventions Section

Interventions data not found



Health Status Evaluations/Outcomes Section

Health Status Evaluations/Outcomes data not found



Advance Directives

No Advance Directive data

## 2020-02-19 NOTE — XMS REPORT
CCD document using C-CDA

                             Created on: 2020



Leilani Ramírez

External Reference #: [id number]

: 1940

Sex: Female



Demographics





                          Address                   [address] [place]  [address]

 

                          Home Phone                [phone]

 

                          Preferred Language        Unknown

 

                          Marital Status            

 

                          Anabaptist Affiliation     Unknown

 

                          Race                      White

 

                          Ethnic Group              Not  or 





Author





                          Author                    Leilani Driver D.O.

 

                          Organization              [facility]

 

                          Address                   [address] [place]  [address]



 

                          Phone                     [phone]







Care Team Providers





                    Care Team Member Name Role                Phone

 

                    Akanksha Driver D.O. PP                  Unavailable

 

                          CCM                       Unavailable







Summary Purpose

Interface Exchange



Insurance Providers





             Payer name   Policy type / Coverage type Covered party ID Effective

 Begin Date 

Effective End Date

 

             [place] [place] [id number]  2018   

  Unknown

 

             [employer] Senior Supplemental Medicare Part B [id number]   [id number]    

 Unknown







Family history





Father



                          Diagnosis                 Age At Onset

 

                          Heart disease             Unknown







Mother



                          Diagnosis                 Age At Onset

 

                          Heart disease             Unknown

 

                          Cancer                    Unknown







Social History





                Social History Element Codes           Description     Effective

 Dates

 

                Tobacco history SNOMED CT: 629445514 Never smoker    2011

 

                Marital status  Unknown         Single          2010

 

                Number of children Unknown         9               2010







Allergies, Adverse Reactions, Alerts





           Substance  Reaction   Codes      Entered Date Inactivated Date Status

 

           _                     Unknown    2019 No Inactive Date Active

 

           * NO KNOWN FOOD ALLERGIES            Unknown    2010 No Inactiv

e Date Active

 

           _                     Unknown    2010 No Inactive Date Active

 

           QUINIDINE-QUININE ANALOGUES hives,     Unknown    2010 No Inact

diamante Date Active







Problems





                Condition       Codes           Effective Dates Condition Status

 

                          Essential (primary) hypertension ICD-9: 401.9

ICD-10: I10               2014                Active

 

                          Mixed hyperlipidemia      ICD-9: 272.4

ICD-10: E78.2             2019                Active

 

                          FLU VACCINE               ICD-9: V04.81

ICD-10: Z23               2012                Active

 

                          Acute serous otitis media, left ear ICD-9: 381.01

ICD-10: H65.02            2019                Active

 

                          Coronary atherosclerosis due to calcified coronary les

ion ICD-9: 414.00

ICD-10: I25.84            2018                Active

 

                          Hypoglycemia, unspecified ICD-9: 251.2

ICD-10: E16.2             2017                Active

 

                          Other fatigue             ICD-9: 780.79

ICD-10: R53.83            2017                Active

 

                          Urinary tract infection, site not specified ICD-9: 599

.0

ICD-10: N39.0             10/02/2018                Active

 

                          Gastro-esophageal reflux disease without esophagitis I

CD-9: 530.81

ICD-10: K21.9             2018                Active

 

                          Epigastric pain           ICD-9: 789.06

ICD-10: R10.13            2018                Active

 

                          Atherosclerotic heart disease of native coronary arter

y without angina pectoris 

ICD-9: 414.00

ICD-10: I25.10            2018                Active

 

                          Generalized anxiety disorder ICD-9: 300.00

ICD-10: F41.1             2018                Active

 

                          Palpitations              ICD-9: 785.1

ICD-10: R00.2             10/02/2018                Active

 

                          Dizziness and giddiness   ICD-9: 780.4

ICD-10: R42               2014                Active

 

                          Occlusion and stenosis of bilateral carotid arteries I

CD-9: 433.10

ICD-10: I65.23            2018                Active

 

                          Cervicalgia               ICD-9: 723.1

ICD-10: M54.2             2018                Active

 

                          Other spondylosis with radiculopathy, cervical region 

ICD-9: 721.0

ICD-10: M47.22            2018                Active

 

                          Cervicocranial syndrome   ICD-9: 723.2

ICD-10: M53.0             2018                Active

 

                          Vertigo of central origin, bilateral ICD-9: 386.2

ICD-10: H81.43            2018                Active

 

                          Benign paroxysmal vertigo, bilateral ICD-9: 386.11

ICD-10: H81.13            2018                Active

 

                          Otalgia, bilateral        ICD-9: 388.70

ICD-10: H92.03            2018                Active

 

                          Left lower quadrant pain  ICD-9: 789.04

ICD-10: R10.32            2017                Active

 

                          Low back pain             ICD-9: 724.2

ICD-10: M54.5             2017                Active

 

                          Radiculopathy, lumbosacral region ICD-9: 724.4

ICD-10: M54.17            2018                Active

 

                          Impaired fasting glucose  ICD-9: 790.29

ICD-10: R73.01            2012                Active

 

                          Other intervertebral disc degeneration, lumbar region 

ICD-9: 722.52

ICD-10: M51.36            2017                Active

 

                          Pain in thoracic spine    ICD-9: 724.1

ICD-10: M54.6             2017                Active

 

                          Mastodynia                ICD-9: 611.71

ICD-10: N64.4             2017                Active

 

                          Acute upper respiratory infection, unspecified ICD-9: 

465.9

ICD-10: J06.9             2017                Active

 

                          Acute mastoiditis without complications, right ear ICD

-9: 383.00

ICD-10: H70.001           2016                Active

 

                          Constipation, unspecified ICD-9: 564.00

ICD-10: K59.00            2016                Active

 

                          Chronic kidney disease, stage 1 ICD-9: 585.1

ICD-10: N18.1             03/15/2016                Active

 

                          History of falling        ICD-9: V15.88

ICD-10: Z91.81            12/15/2015                Active

 

                          Muscle weakness (generalized) ICD-9: 780.79

ICD-10: M62.81            2015                Active

 

                Acute renal failure ICD-9: 584.9    2015      Active

 

                POLYURIA        ICD-9: 788.42   2015      Active

 

                CAD             ICD-9: 414.00   09/10/2015      Active

 

                Hyperglycemia   ICD-9: 790.29   2012      Active

 

                HYPERLIPIDEMIA NEC/NOS ICD-9: 272.4    09/10/2015      Active

 

                ABDOMINAL PAIN  ICD-9: 789.00   10/10/2012      Active

 

                Grieving        ICD-9: 309.0    2015      Active

 

                HYPOGLYCEMIA    ICD-9: 251.2    2012      Active

 

                MALAISE AND FATIGUE ICD-9: 780.79   2015      Active

 

                CERUMEN IMPACTION ICD-9: 380.4    2015      Active

 

                Leg pain        ICD-9: 729.5    2015      Active

 

                SUPERFIC PHLEBITIS-LEG ICD-9: 451.0    2015      Active

 

                SPASM OF MUSCLE ICD-9: 728.85   2015      Active

 

                Thoracic back pain ICD-9: 724.1    2015      Active

 

                Benign positional vertigo ICD-9: 386.11   2015      Active

 

                Suspicious nevus ICD-9: 238.2    2015      Active

 

                EUSTACHIAN TUBE DYSFUNCTION ICD-9: 381.81   2014      Acti

ve

 

                SINUSITIS, ACUTE ICD-9: 461.9    2014      Active

 

                DIZZINESS/VERTIGO ICD-9: 780.4    2014      Active

 

                HYPERTENSION    ICD-9: 401.9    2014      Active

 

                URI, ACUTE      ICD-9: 465.9    10/15/2013      Active

 

                CEPHALGIA       ICD-9: 784.0    2013      Active

 

                OTITIS MEDIA NOS ICD-9: 382.9    2013      Active

 

                Perforation of ear drum ICD-9: 384.20   05/10/2013      Active

 

                Mastalgia       ICD-9: 611.71   2013      Active

 

                DYSPEPSIA       ICD-9: 536.8    10/10/2012      Active

 

                IBS             ICD-9: 564.1    10/10/2012      Active

 

                FLU VACCINE     ICD-9: V04.81   2012      Active

 

                Radiculopathy of leg ICD-9: 724.4    2012      Active

 

                SCIATICA        ICD-9: 724.3    2012      Active

 

                Lumbar degenerative disc disease ICD-9: 722.52   2012     

 Active

 

                Sacroiliac dysfunction ICD-9: 739.4    2012      Active

 

                Hypertension    Unknown         2012      Active

 

                PAIN, LOWER BACK ICD-9: 724.2    2011      Active

 

                SPINAL ENTHESOPATHY ICD-9: 720.1    2011      Active

 

                Hypotension     ICD-9: 458.9    2011      Active

 

                SACROILIITIS NEC ICD-9: 720.2    2011      Active

 

                PHARYNGITIS, ACUTE ICD-9: 462      2010      Active

 

                URINARY TRACT INFECTION ICD-9: 599.0    2010      Active

 

                Heat exhaustion ICD-9: 992.5    2010      Active

 

                Esophagitis     ICD-9: 530.10   2010      Active

 

                Gastritis       ICD-9: 535.50   2010      Active

 

                GERD            ICD-9: 530.81   2010      Active

 

                Hiatal hernia   ICD-9: 553.3    2010      Active

 

                B12 DEFIC ANEMIA NEC ICD-9: 281.1    2010      Active

 

                Anxiety         Unknown         2010      Active

 

                Heart disease   Unknown         2010      Active

 

                Hyperlipidemia  Unknown         2010      Active

 

                ANXIETY STATE NOS ICD-9: 300.00   2010      Active

 

                DEPRESSIVE DISORDER NEC ICD-9: 311      2010      Active







Medications





          Medication Codes     Instructions Start Date Stop Date Status    Fill 

Instructions

 

           simvastatin 40 mg tablet RxNorm: 361794 1 Tablet(s) PO QD 2019 

02/15/2020 

Active                                   

 

                omeprazole 40 mg capsule,delayed release RxNorm: 2003 Capsu

le(s) Oral QD 

2019          Active               

 

                Xanax 0.25 mg tablet RxNorm: 359202  TAKE 1 TABLET BY MOUTH TWIC

E DAILY 

10/29/2019          No Stop Date        Active               

 

                omeprazole 40 mg capsule,delayed release RxNorm: 2003 Capsu

le(s) PO QD 

10/07/2019          2019          Inactive             

 

             metoprolol tartrate 25 mg tablet RxNorm: 356391 1 Tablet(s) PO BID 

2019                Active                     

 

                omeprazole 40 mg capsule,delayed release RxNorm: 790819   Capsu

le(s) PO QD 

2019          10/06/2019          Inactive             

 

                    Flonase Allergy Relief 50 mcg/actuation nasal spray,suspensi

on RxNorm: 6267063     2

[place] [facility] 2019      No Stop Date    Active           

 

           simvastatin 40 mg tablet RxNorm: 392523 1 Tablet(s) PO QD 2019 

Inactive                                 

 

           simvastatin 40 mg tablet RxNorm: 313049 1 Tablet(s) PO QD 2019 

Inactive                                 

 

                omeprazole 40 mg capsule,delayed release RxNorm: 876035   Capsu

le(s) PO QD 

2019          Inactive             

 

           simvastatin 40 mg tablet RxNorm: 128686 1 Tablet(s) PO QD 2019 

Inactive                                 

 

                omeprazole 40 mg capsule,delayed release RxNorm: 529711  1 Capsu

le(s) PO QD 

2019          Inactive             

 

             Bactrim  mg-160 mg tablet RxNorm: 808731 1 Tablet(s) PO BID 0

3/08/2019   

2019                Inactive                   

 

             Bactrim  mg-160 mg tablet RxNorm: 892438 1 Tablet(s) PO BID 0

3/08/2019   

2019                Inactive                   

 

             Macrobid 100 mg capsule RxNorm: 767002 1 Capsule(s) PO BID 20

                          Inactive                   

 

             metoprolol tartrate 25 mg tablet RxNorm: 993804 1 Tablet(s) PO BID 

2019                Inactive                   

 

                Xanax 0.25 mg tablet RxNorm: 965124  TAKE 1 TABLET BY MOUTH TWIC

E DAILY 

2018          10/28/2019          Inactive             

 

           Xanax 0.25 mg tablet RxNorm: 110249 1 Tablet(s) PO BID 2018 

Inactive                                 

 

                    Protonix 40 mg tablet,delayed release RxNorm: 776710      1 

Tablet(s) PO BID for 

stomach--replaces omeprazole 2018      Inactive         

 

             Carafate 1 gram tablet RxNorm: 871704 1 Tablet(s) PO AC & HS 2019                Inactive                   

 

           Xanax 0.25 mg tablet RxNorm: 715141 1 Tablet(s) PO BID 10/30/2018 12/

10/2018 

Inactive                                 

 

             Bactrim  mg-160 mg tablet RxNorm: 532345 1 Tablet(s) PO BID 1

   

10/08/2018                Inactive                   

 

             Bactrim  mg-160 mg tablet RxNorm: 967811 1 Tablet(s) PO BID 1

   

10/03/2018                Inactive                   

 

             Carafate 1 gram tablet RxNorm: 248095 1 Tablet(s) PO AC & HS 09/18/

2018   

10/17/2018                Inactive                   

 

                    Protonix 40 mg tablet,delayed release RxNorm: 886111      1 

Tablet(s) PO BID for 

stomach--replaces omeprazole 2018      Inactive         

 

                    Protonix 40 mg tablet,delayed release RxNorm: 317454      1 

Tablet(s) PO BID for 

stomach--replaces omeprazole 2018      Inactive         

 

                    fluticasone 50 mcg/actuation nasal spray,suspension RxNorm: 

5428260     2 Spray 

NASAL QD to each nostril 2018      Inactive         

 

             Nasonex 50 mcg/actuation Spray RxNorm: 2254553 2 [place] [facility] 2018                Inactive                   

 

                omeprazole 40 mg capsule,delayed release RxNorm: 129919  1 Capsu

le(s) PO QD 

2018          08/15/2018          Inactive             

 

                    simvastatin 40 mg tablet RxNorm: 620708      1 Tablet(s) PO 

QD TAKE 1 TABLET EVERY 

DAY             2018      Inactive         

 

             metoprolol tartrate 25 mg tablet RxNorm: 406844 1/2 Tablet(s) PO QD

 2018                Inactive                   

 

                simvastatin 40 mg tablet RxNorm: 020035  Tablet(s) TAKE 1 TABLET

 EVERY DAY 

2017          Inactive             

 

             metoprolol tartrate 25 mg tablet RxNorm: 655610 1/2 Tablet(s) PO QD

 2017                Inactive                   

 

                    Flonase 50 mcg/actuation nasal spray,suspension RxNorm: 1797

933     2 [place] [facility] 

BID             2017      Inactive         

 

                omeprazole 40 mg capsule,delayed release RxNorm: 371164  1 Capsu

le(s) PO QD 

2017          Inactive             

 

             metoprolol tartrate 25 mg tablet RxNorm: 068072 1/2 Tablet(s) PO QD

 04/10/2017   

2017                Inactive                   

 

                    ciprofloxacin 0.2 % ear drops in a dropperette RxNorm: 73410

6      4 Drop(s) OTIC TID

for 1 week      2016      Inactive         

 

           cefdinir 300 mg capsule RxNorm: 788317 2 Capsule(s) PO QD 2016 

Inactive                                 

 

                    omeprazole 40 mg capsule,delayed release RxNorm: 772470     

 TAKE 1 CAPSULE EVERY DAY

                2016      Inactive         

 

             simvastatin 40 mg tablet RxNorm: 748177 TAKE 1 TABLET EVERY DAY 2017                Inactive                   

 

                    Flonase 50 mcg/actuation nasal spray,suspension RxNorm: 1797

933     2 [place] [facility] 

BID             2015      Inactive         

 

             cefuroxime axetil 250 mg tablet RxNorm: 504202 1 Tablet(s) PO BID 0

2015                Inactive                   

 

             cefuroxime axetil 250 mg tablet RxNorm: 475270 1 Tablet(s) PO BID 0

2015                Inactive                   

 

                omeprazole 40 mg capsule,delayed release RxNorm: 915451  1 Capsu

le(s) PO QD 

2015          Inactive             

 

           meloxicam 7.5 mg tablet RxNorm: 945593 1 Tablet(s) PO QD 2015 0

2015 

Inactive                                 

 

                loratadine 10 mg tablet RxNorm: 130376  1 Tablet(s) PO QAM for a

llergies 

2014          Inactive             

 

                    Flonase 50 mcg/actuation nasal spray,suspension RxNorm: 8963

23      2 [place] [facility] BID

                2014      12/15/2015      Inactive         

 

           prednisone 20 mg tablet RxNorm: 516099 2 Tablet(s) PO BID 2014 

Inactive                                 

 

             Dyazide 37.5 mg-25 mg capsule RxNorm: 911029 1 Capsule(s) PO QAM 2014                Inactive                   

 

           Ceftin 500 mg tablet RxNorm: 867221 1 Tablet(s) PO BID 2014 

Inactive                                 

 

           Ceftin 500 mg tablet RxNorm: 244614 1 Tablet(s) PO BID 2014 

Inactive                                 

 

                    simvastatin 40 mg tablet RxNorm: 230790      Tablet(s) PO TA

KE ONE TABLET BY MOUTH 

EVERY DAY       2014      Inactive         

 

                    metoprolol tartrate 25 mg tablet RxNorm: 697110      Tablet(

s) PO TAKE ONE-HALF 

TABLET BY MOUTH EVERY DAY 2014      Inactive         

 

           Ceftin 500 mg tablet RxNorm: 122533 1 Tablet(s) PO BID 10/15/2013 10/

 

Inactive                                 

 

                loratadine 10 mg tablet RxNorm: 572968  1 Tablet(s) PO QAM for a

llergies 

2013          Inactive             

 

                    omeprazole 20 mg capsule,delayed release RxNorm: 257493     

 Capsule(s) PO TAKE ONE 

CAPSULE BY MOUTH TWICE DAILY 2013      Inactive         

 

                omeprazole 20 mg capsule,delayed release RxNorm: 657279  1 Capsu

le(s) PO BID 

2013          Inactive             

 

             Augmentin 875 mg-125 mg tablet RxNorm: 462113 1 Tablet(s) PO Q12H 0

5/10/2013   

2013                Inactive                   

 

             Floxin Otic Drops 1 bottle Drops RxNorm:      5 Drop(s) OTIC BID 05

/10/2013   

2013                Inactive                   

 

                    metoprolol tartrate 25 mg tablet RxNorm: 833495      Tablet(

s) PO TAKE ONE-HALF 

TABLET BY MOUTH EVERY DAY 2013      Inactive         

 

                    simvastatin 40 mg tablet RxNorm: 853536      Tablet(s) PO TA

KE ONE TABLET BY MOUTH 

EVERY DAY       2013      Inactive         

 

                lancets         RxNorm:         Misc Miscellaneous USE ONE TO CH

YOGI GLUCOSE EVERY DAY 

2013          Inactive             

 

             Carafate 1 gram tablet RxNorm: 420029 1 Tablet(s) PO AC & HS 2015                Inactive                   

 

           Flagyl 500 mg tablet RxNorm: 010437 1 Tablet(s) PO TID 10/10/2012 10/

 

Inactive                                 

 

             Carafate 1 gram tablet RxNorm: 283825 1 Tablet(s) PO AC & HS 10/10/

2012   

2012                Inactive                   

 

          One Touch Test strips RxNorm:   Miscellaneous QD 2012

 Inactive  

one touch ultra mini test strips

 

           Protonix 40 mg Tab RxNorm: 674861 1 Tablet(s) PO QD 2012 

Inactive                                 

 

           Protonix 40 mg Tab RxNorm: 795622 1 Tablet(s) PO QD 2012 10/09/

2012 

Inactive                                 

 

           prednisone 20 mg Tab RxNorm: 549826 1 Tablet(s) PO BID 2012 

Inactive                                 

 

             Flexeril 5 mg Tab RxNorm: 543551 1 Tablet(s) PO QHS for spasm 2012                Inactive                   

 

             Flexeril 5 mg Tab RxNorm: 833968 1 Tablet(s) PO QHS for spasm 2012                Inactive                   

 

                amlodipine 2.5 mg Tab RxNorm: 618001  1/2 Tablet(s) PO QD replac

es 5mg dose 

2012          Inactive             

 

           simvastatin 40 mg tablet RxNorm: 886148 1 Tablet(s) PO QD 2012 

Inactive                                 

 

             metoprolol tartrate 25 mg tablet RxNorm: 093200 1/2 Tablet(s) PO QD

 2012                Inactive                   

 

                omeprazole 20 mg capsule,delayed release RxNorm: 717045  1 Capsu

le(s) PO BID 

2012          Inactive             

 

           cefdinir 300 mg Cap RxNorm: 285975 2 Capsule(s) PO QD 2011 

Inactive                                 

 

           simvastatin 40 mg Tab RxNorm: 163439 1 Tablet(s) PO QD 2011 

Inactive                                 

 

             metoprolol tartrate 25 mg Tab RxNorm: 999450 1/2 Tablet(s) PO QD 10

/   

2012                Inactive                   

 

             metoprolol tartrate 25 mg Tab RxNorm: 235800 1/2 Tablet(s) PO QD 10

/   

10/24/2011                Inactive                   

 

           meclizine 25 mg Tab RxNorm: 4510478 1 Tablet(s) PO QHS 2011 

Inactive                                for dizziness

 

           Ceftin 500 mg Tab RxNorm: 816596 1 Tablet(s) PO BID 2011 

Inactive                                 

 

                omeprazole 20 mg Cap, Delayed Release RxNorm: 881090  1 Capsule(

s) PO BID 

2011          10/24/2011          Inactive             

 

           simvastatin 40 mg Tab RxNorm: 136530 1 Tablet(s) PO QD 2011 

Inactive                                 

 

           simvastatin 40 mg Tab RxNorm: 821288 1 Tablet(s) PO QD 2011 

Inactive                                 

 

           simvastatin 40 mg Tab RxNorm: 410149 1 Tablet(s) PO QD 2010 

Inactive                                 

 

             Tessalon Perles 100 mg Cap RxNorm: 391196 1 Capsule(s) PO Q6-8H 2010                Inactive                   

 

           cefdinir 300 mg Cap RxNorm: 170008 1 Capsule(s) PO BID 2010 

Inactive                                 

 

           Lexapro 10 mg Tab RxNorm: 703030 1 Tablet(s) PO QD 2010

010 

Inactive                                 

 

           Cipro 250 mg Tab RxNorm: 307165 1 Tablet(s) PO BID 2010 10/04/2

010 

Inactive                                 

 

             Wellbutrin  mg 24 hr Tab RxNorm: 155789 1 Tablet(s) PO QAM 2010                Inactive                   

 

          Fish Oil 1,000 mg Cap RxNorm:   1 Capsule(s) PO QD No Start Date      

     Active     

 

                Tylenol Extra Strength 500 mg tablet RxNorm: 498278  1/2 Tablet(

s) PO as needed 

No Start Date                           Active               

 

                nitroglycerin 0.4 mg sublingual tablet RxNorm: 068102  Tablet(s)

 SL as needed No 

Start Date                              Active               

 

                    Calcium with Vitamin D 600 mg (1,500 mg)-400 unit tablet RxN

orm: 472325      1 

Tablet(s) PO QD No Start Date                   Active           

 

           Multivitamin & Mineral Formula Tab RxNorm:    1 Tablet(s) PO QD No St

art Date            

Active                                   

 

          vitamin B complex capsule RxNorm:   1 Capsule(s) PO QD No Start Date  

         Active     

 

          Aspirin 81 mg Tab RxNorm: 582372 1 Tablet(s) PO QD No Start Date      

     Active     

 

                    isosorbide mononitrate ER 30 mg tablet,extended release 24 h

r RxNorm: 243016      1 

Tablet(s) PO QHS No Start Date                   Active           

 

           Vitamin D 1,000 unit Cap RxNorm: 380949 1 Capsule(s) PO QD No Start D

ate            

Active                                   

 

          Co Q-10 oral RxNorm: 73677 oral      No Start Date           Active   

  

 

             metoprolol tartrate 25 mg Tab RxNorm: 024199 1/2 Tablet(s) PO QD No

 Start Date 

10/23/2011                Inactive                   

 

             Nasonex 50 mcg/actuation Spray RxNorm: 7114353 2 Spray NASAL No Sta

rt Date 

2018                Inactive                   

 

           Vitamin B12 1000mcg Tablet RxNorm:    1 Tablet(s) PO QD No Start Date

 2015 

Inactive                                 

 

           amlodipine 5 mg Tab RxNorm: 947583 1 Tablet(s) PO QD No Start Date  

Inactive                                 

 

             simvastatin 40 mg tablet RxNorm: 527818 1 Tablet(s) PO QD No Start 

Date 

2019                Inactive                   

 

                omeprazole 20 mg capsule,delayed release RxNorm: 736710  1 Capsu

le(s) PO BID No 

Start Date          2013          Inactive             

 

                omeprazole 40 mg capsule,delayed release RxNorm: 868077  1 Capsu

le(s) PO QD No 

Start Date          2019          Inactive             

 

           Nexium 40 mg Cap RxNorm: 029962 1 Capsule(s) PO QD No Start Date  

Inactive                                 

 

             Stool Softener 100 mg Tab RxNorm: 1021920 2 Tablet(s) PO BID No Sta

rt Date 

2012                Inactive                   

 

                    Calcium with Vitamin D 600 mg (1,500 mg)-400 unit Tab RxNorm

: 024359      1 Tablet(s)

PO QD           No Start Date   2015      Inactive         

 

             iron 325 mg (65 mg iron) Tab RxNorm: 917300 1 Tablet(s) PO QD No St

art Date 

2015                Inactive                   

 

                Flonase 50 mcg/actuation Nasal Spray RxNorm: 683648  2 [place] ROZ

AL BID No Start 

Date                2014          Inactive             

 

                    fluticasone 50 mcg/actuation nasal spray,suspension RxNorm: 

5788246     2 Spray 

NASAL QD to each nostril No Start Date   2018      Inactive         

 

             Nasonex 50 mcg/actuation Spray RxNorm: 0130148 2 Spray NASAL QD No 

Start Date 

2018                Inactive                   

 

                    hydralazine 50 mg tablet RxNorm: 190515      1 Tablet(s) PO 

as needed for BP over 

160/90          No Start Date   2018      Inactive         

 

             Vimovo 500 mg-20 mg 12 hr Tab RxNorm: 658457 1 Tablet(s) PO BID No 

Start Date 

2011                Inactive                   

 

             Tylenol PM 25 mg-500 mg/15 mL Oral Soln RxNorm: 5173384 1 PO QPM   

  No Start Date 

2015                Inactive                   

 

          Iron (Ferrous Sulfate) Oral RxNorm:   Oral      No Start Date 20

12 Inactive   

 

             Reglan 10 mg Tab RxNorm: 855933 1 Tablet(s) PO TID before meals No 

Start Date 

2011                Inactive                   

 

           Xanax 1 mg Tab RxNorm: 658251 1/2 Tablet(s) PO QD No Start Date  

Inactive                                 

 

             amlodipine 10 mg tablet RxNorm: 767479 1 Tablet(s) PO QD No Start D

ate 

2014                Inactive                   

 

          Iron (dried) Oral RxNorm:   Oral      No Start Date 2012 Inactiv

e   

 

                metoprolol tartrate 50 mg tablet RxNorm: 410531  1 Tablet(s) PO 

BID No Start Date

                    12/10/2018          Inactive             

 

           Xanax 0.25 mg tablet RxNorm: 015925 1 Tablet(s) PO BID No Start Date 

10/29/2018 

Inactive                                 

 

                lancets         RxNorm:         Miscellaneous check blood sugar 

at least once daily No Start 

Date                2013          Inactive             

 

           simvastatin 40 mg Tab RxNorm: 657658 1 Tablet(s) PO QD No Start Date 

2010 

Inactive                                 

 

                    isosorbide mononitrate ER 30 mg tablet,extended release 24 h

r RxNorm: 492045      1 

Tablet(s) PO QHS No Start Date   2019      Inactive         

 

             amlodipine 2.5 mg tablet RxNorm: 991093 1 Tablet(s) PO QD No Start 

Date 

2014                Inactive                   

 

             sucralfate 1 gram tablet RxNorm: 390903 1 Tablet(s) PO QID No Start

 Date 

2019                Inactive                   

 

          Fish Oil Oral RxNorm:   Oral      No Start Date 2012 Inactive   

 

          Multiple Vitamin Oral RxNorm:   Oral      No Start Date 2012 [place]

ctive   

 

                metoprolol tartrate 25 mg tablet RxNorm: 922929  1/2 Tablet(s) P

O QD No Start 

Date                2017          Inactive             

 

                metoprolol tartrate 50 mg tablet RxNorm: 191440  1/2 Tablet(s) P

O BID No Start 

Date                2019          Inactive             

 

                    Flonase Allergy Relief 50 mcg/actuation nasal spray,suspensi

on RxNorm: 9358313     2

[place] NASAL QHS No Start Date   2019      Inactive         







Medication Administered

No Medication Administered data



Immunizations





                Vaccine         Codes           Date            Status

 

                Influenza       CVX: 135        10/22/2019      Complete

 

                Influenza       CVX: 135        10/22/2019      Complete

 

                Influenza       CVX: 135        2018      Complete

 

                Influenza       CVX: 135        10/06/2017      Complete

 

                Influenza       CVX: 135        10/07/2016      Complete

 

                Influenza       CVX: 135        10/16/2015       

 

                Influenza       CVX: 141        2013       

 

                Influenza (Adult) CVX: 141        10/06/2010       







Results





          Observation Observation Code Item      Item Code Result    Date      S

ervice Location

 

          GFR CALC  0791134   GFR Non Afr Amr           52 mL/min 10/11/2019 Unk

nown

 

          GFR CALC  9963582   GFR Afr Amr           >60 mL/min 10/11/2019 Unknow

n

 

          COMPREHENSIVE METABOLIC 81680     AST                 17 U/L    10/11/

2019 Unknown

 

          COMPREHENSIVE METABOLIC 28581     ALT                 11 U/L    10/11/

2019 Unknown

 

          COMPREHENSIVE METABOLIC 83193     BUN                 17 mg/dL  10/11/

2019 Unknown

 

          COMPREHENSIVE METABOLIC 70137     ALBUMIN             3.9 g/dL  10/11/

2019 Unknown

 

          COMPREHENSIVE METABOLIC 86478     CHLORIDE            108 mmol/L 10/11

/2019 Unknown

 

          COMPREHENSIVE METABOLIC 53093     Bili Total           0.5 mg/dL 10/11

/2019 Unknown

 

          COMPREHENSIVE METABOLIC 73625     ALK PHOS            72 U/L    10/11/

2019 Unknown

 

          COMPREHENSIVE METABOLIC 77027     SODIUM              142 mmol/L 10/11

/2019 Unknown

 

          COMPREHENSIVE METABOLIC 04072     CREATININE           1.02 mg/dL 10/1

2019 Unknown

 

          COMPREHENSIVE METABOLIC 26428     CALCIUM             9.3 mg/dL 10/11/

2019 Unknown

 

          COMPREHENSIVE METABOLIC 23067     POTASSIUM           4.0 mmol/L 10/11

/2019 Unknown

 

          COMPREHENSIVE METABOLIC 42003     Total Protein           6.2 g/dL  10

/2019 Unknown

 

          COMPREHENSIVE METABOLIC 27986     Glucose             93 mg/dL  10/11/

2019 Unknown

 

          COMPREHENSIVE METABOLIC 50589     Bicarbonate           27 mmol/L 10/1

2019 Unknown

 

          COMPREHENSIVE METABOLIC 41967     AGAP                7 mmol/L  10/11/

2019 Unknown

 

          GFR CALC  5340219   GFR Non Afr Amr           48 mL/min 06/10/2019 Unk

nown

 

          GFR CALC  8971845   GFR Afr Amr           59 mL/min 06/10/2019 Unknown

 

          THYROID STIMULATING HORMONE 91122     TSH                 1.936 uIU/mL

 06/10/2019 Unknown

 

          COMPREHENSIVE METABOLIC 65605     AST                 18 U/L    06/10/

2019 Unknown

 

          COMPREHENSIVE METABOLIC 37428     ALT                 11 U/L    06/10/

2019 Unknown

 

          COMPREHENSIVE METABOLIC 02893     BUN                 18 mg/dL  06/10/

2019 Unknown

 

          COMPREHENSIVE METABOLIC 70413     ALBUMIN             4.2 g/dL  06/10/

2019 Unknown

 

          COMPREHENSIVE METABOLIC 74770     CHLORIDE            109 mmol/L 06/10

/2019 Unknown

 

          COMPREHENSIVE METABOLIC 06073     Bili Total           0.4 mg/dL 06/10

/2019 Unknown

 

          COMPREHENSIVE METABOLIC 71819     ALK PHOS            64 U/L    06/10/

2019 Unknown

 

          COMPREHENSIVE METABOLIC 49193     SODIUM              142 mmol/L 06/10

/2019 Unknown

 

          COMPREHENSIVE METABOLIC 85378     CREATININE           1.09 mg/dL  Unknown

 

          COMPREHENSIVE METABOLIC 71208     CALCIUM             9.3 mg/dL 06/10/

2019 Unknown

 

          COMPREHENSIVE METABOLIC 57416     POTASSIUM           4.7 mmol/L 06/10

/2019 Unknown

 

          COMPREHENSIVE METABOLIC 76953     Total Protein           6.3 g/dL  06

/10/2019 Unknown

 

          COMPREHENSIVE METABOLIC 77895     Glucose             84 mg/dL  06/10/

2019 Unknown

 

          COMPREHENSIVE METABOLIC 63307     Bicarbonate           25 mmol/L  Unknown

 

          COMPREHENSIVE METABOLIC 46392     AGAP                8 mmol/L  06/10/

2019 Unknown

 

          COMPLETE BLOOD COUNT [id number]   WBC                 7.8 10e9/L 06/10/20

19 Unknown

 

          COMPLETE BLOOD COUNT [id number]   RBC                 4.04 10e12/L 06/10/

2019 Unknown

 

          COMPLETE BLOOD COUNT 9451966   HEMOGLOBIN           12.2 g/dL 06/10/20

19 Unknown

 

          COMPLETE BLOOD COUNT [id number]   HEMATOCRIT           38.6 %    06/10/20

19 Unknown

 

          COMPLETE BLOOD COUNT [id number]   MCV                 95.5 fL   06/10/201

9 Unknown

 

          COMPLETE BLOOD COUNT [id number]   MCH                 30.2 pg   06/10/201

9 Unknown

 

          COMPLETE BLOOD COUNT [id number]   MCHC                31.6 g/dL 06/10/201

9 Unknown

 

          COMPLETE BLOOD COUNT [id number]   PLATELET COUNT           215 10e9/L 06/

10/2019 Unknown

 

          COMPLETE BLOOD COUNT [id number]   Mean Plt Volume           11.2 fL   06/

10/2019 Unknown

 

          COMPLETE BLOOD COUNT [id number]   Neut Auto           45.7 %    06/10/201

9 Unknown

 

          COMPLETE BLOOD COUNT [id number]   Lymph Auto           42.1 %    06/10/20

19 Unknown

 

          COMPLETE BLOOD COUNT [id number]   Mono Auto           9.2 %     06/10/201

9 Unknown

 

          COMPLETE BLOOD COUNT [id number]   RDW                 13.4 %    06/10/201

9 Unknown

 

          COMPLETE BLOOD COUNT [id number]   Eos Auto            2.7 %     06/10/201

9 Unknown

 

          COMPLETE BLOOD COUNT [id number]   Baso Auto           0.3 %     06/10/201

9 Unknown

 

          COMPLETE BLOOD COUNT [id number]   Neutrophil Abs           3.56 10e9/L 06

/10/2019 Unknown

 

          COMPLETE BLOOD COUNT [id number]   Lymphocyte Abs           3.28 10e9/L 06

/10/2019 Unknown

 

          COMPLETE BLOOD COUNT [id number]   Monocyte Abs           0.72 10e9/L  Unknown

 

          COMPLETE BLOOD COUNT [id number]   Eosinophil Abs           0.21 10e9/L 06

/10/2019 Unknown

 

          COMPLETE BLOOD COUNT [id number]   RDW-SD              44.9 fL   06/10/201

9 Unknown

 

          COMPLETE BLOOD COUNT [id number]   Basophil Abs           0.02 10e9/L  Unknown

 

          COMPLETE BLOOD COUNT [id number]   WBC                 5.2 10e9/L 20

18 Unknown

 

          COMPLETE BLOOD COUNT [id number]   RBC                 4.21 10e12/L 2018 Unknown

 

          COMPLETE BLOOD COUNT [id number]   HEMOGLOBIN           12.8 g/dL 20

18 Unknown

 

          COMPLETE BLOOD COUNT [id number]   HEMATOCRIT           39.0 %    20

18 Unknown

 

          COMPLETE BLOOD COUNT [id number]   MCV                 92.6 fL   

8 Unknown

 

          COMPLETE BLOOD COUNT [id number]   MCH                 30.4 pg   

8 Unknown

 

          COMPLETE BLOOD COUNT [id number]   MCHC                32.8 g/dL 

8 Unknown

 

          COMPLETE BLOOD COUNT [id number]   PLATELET COUNT           202 10e9/L  Unknown

 

          COMPLETE BLOOD COUNT [id number]   Mean Plt Volume           10.7 fL    Unknown

 

          COMPLETE BLOOD COUNT [id number]   Neut Auto           40.9 %    

8 Unknown

 

          COMPLETE BLOOD COUNT [id number]   Lymph Auto           46.3 %    20

18 Unknown

 

          COMPLETE BLOOD COUNT [id number]   Mono Auto           9.3 %     

8 Unknown

 

          COMPLETE BLOOD COUNT [id number]   RDW                 13.7 %    

8 Unknown

 

          COMPLETE BLOOD COUNT [id number]   Eos Auto            2.9 %     

8 Unknown

 

          COMPLETE BLOOD COUNT [id number]   Baso Auto           0.6 %     

8 Unknown

 

          COMPLETE BLOOD COUNT [id number]   Neutrophil Abs           2.13 10e9/L  Unknown

 

          COMPLETE BLOOD COUNT [id number]   Lymphocyte Abs           2.41 10e9/L  Unknown

 

          COMPLETE BLOOD COUNT [id number]   Monocyte Abs           0.48 10e9/L  Unknown

 

          COMPLETE BLOOD COUNT [id number]   Eosinophil Abs           0.15 10e9/L  Unknown

 

          COMPLETE BLOOD COUNT [id number]   RDW-SD              45.2 fL   

8 Unknown

 

          COMPLETE BLOOD COUNT [id number]   Basophil Abs           0.03 10e9/L  Unknown

 

          [place] [address]     Glucose             118 mg/dL  Unknown

 

          [place] [address]     CREATININE           1.01 mg/dL  Unknown

 

          [place] [address]     BUN                 14 mg/dL   Unknown

 

          [place] [address]     SODIUM              141 mmol/L  Unknown

 

          [place] [address]     POTASSIUM           4.0 mmol/L  Unknown

 

          [place] [address]     CHLORIDE            108 mmol/L  Unknown

 

          [place] [address]     Bicarbonate           25 mmol/L  Unknown

 

          [place] [address]     AGAP                8 mmol/L   Unknown

 

          [place] [address]     CALCIUM             9.6 mg/dL  Unknown

 

          FREE T4   50718     T4 Free             1.23 ng/dL 2018 Unknown

 

          GFR CALC  5118487   GFR Non Afr Amr           53 mL/min 2018 Unk

nown

 

          GFR CALC  3715261   GFR Afr Amr           >60 mL/min 2018 Unknow

n

 

          THYROID STIMULATING HORMONE 49343     TSH                 2.124 uIU/mL

 2018 Unknown

 

          LIPID GROUP 70598     Cholesterol           152 mg/dL 2018 Unkno

wn

 

          LIPID GROUP 79549     Triglyceride           151 mg/dL 2018 Unkn

own

 

          LIPID GROUP [id number]     HDL CHOLESTEROL           47 mg/dL  2018 U

nknown

 

          LIPID GROUP 96524     Chol/HDL Ratio           3.23 ratio 2018 U

nknown

 

          LIPID GROUP 68032     NON-HDL Chol           105 mg/dL 2018 Unkn

own

 

          LIPID GROUP 07225     LDL Cholesterol           75 mg/dL  2018 U

nknown

 

          ASSAY OF TROPONIN QUANT 99895     Troponin-I           <0.30 ng/mL  Unknown

 

          COMPREHENSIVE METABOLIC 81437     AST                 20 U/L    2018 Unknown

 

          COMPREHENSIVE METABOLIC 17634     ALT                 14 U/L    2018 Unknown

 

          COMPREHENSIVE METABOLIC 37599     BUN                 19 mg/dL  2018 Unknown

 

          COMPREHENSIVE METABOLIC 78654     ALBUMIN             4.2 g/dL  2018 Unknown

 

          COMPREHENSIVE METABOLIC 40266     CHLORIDE            102 mmol/L  Unknown

 

          COMPREHENSIVE METABOLIC 51872     Bili Total           0.4 mg/dL  Unknown

 

          COMPREHENSIVE METABOLIC 61125     ALK PHOS            66 U/L    2018 Unknown

 

          COMPREHENSIVE METABOLIC 12036     SODIUM              135 mmol/L  Unknown

 

          COMPREHENSIVE METABOLIC 11702     CREATININE           1.01 mg/dL  Unknown

 

          COMPREHENSIVE METABOLIC 77704     CALCIUM             9.3 mg/dL 2018 Unknown

 

          COMPREHENSIVE METABOLIC 66058     POTASSIUM           4.8 mmol/L  Unknown

 

          COMPREHENSIVE METABOLIC 12796     Total Protein           7.0 g/dL   Unknown

 

          COMPREHENSIVE METABOLIC 00484     Glucose             91 mg/dL  2018 Unknown

 

          COMPREHENSIVE METABOLIC 61363     Bicarbonate           23 mmol/L  Unknown

 

          COMPREHENSIVE METABOLIC 97351     AGAP                10 mmol/L 2018 Unknown

 

          COMPLETE BLOOD COUNT [id number]   WBC                 7.5 10e9/L 20

18 Unknown

 

          COMPLETE BLOOD COUNT [id number]   RBC                 4.13 10e12/L 2018 Unknown

 

          COMPLETE BLOOD COUNT [id number]   HEMOGLOBIN           12.6 g/dL 20

18 Unknown

 

          COMPLETE BLOOD COUNT [id number]   HEMATOCRIT           38.4 %    20

18 Unknown

 

          COMPLETE BLOOD COUNT [id number]   MCV                 93.0 fL   

8 Unknown

 

          COMPLETE BLOOD COUNT [id number]   MCH                 30.5 pg   

8 Unknown

 

          COMPLETE BLOOD COUNT [id number]   MCHC                32.8 g/dL 

8 Unknown

 

          COMPLETE BLOOD COUNT [id number]   PLATELET COUNT           204 10e9/L  Unknown

 

          COMPLETE BLOOD COUNT [id number]   Mean Plt Volume           10.9 fL    Unknown

 

          COMPLETE BLOOD COUNT [id number]   Neut Auto           43.1 %    

8 Unknown

 

          COMPLETE BLOOD COUNT [id number]   Lymph Auto           45.0 %    20

18 Unknown

 

          COMPLETE BLOOD COUNT [id number]   Mono Auto           9.2 %     

8 Unknown

 

          COMPLETE BLOOD COUNT [id number]   RDW                 13.4 %    

8 Unknown

 

          COMPLETE BLOOD COUNT [id number]   Eos Auto            2.3 %     

8 Unknown

 

          COMPLETE BLOOD COUNT [id number]   Baso Auto           0.4 %     

8 Unknown

 

          COMPLETE BLOOD COUNT [id number]   Neutrophil Abs           3.23 10e9/L  Unknown

 

          COMPLETE BLOOD COUNT 6298511   Lymphocyte Abs           3.38 10e9/L  Unknown

 

          COMPLETE BLOOD COUNT [id number]   Monocyte Abs           0.69 10e9/L  Unknown

 

          COMPLETE BLOOD COUNT [id number]   Eosinophil Abs           0.17 10e9/L  Unknown

 

          COMPLETE BLOOD COUNT [id number]   RDW-SD              44.4 fL   

8 Unknown

 

          COMPLETE BLOOD COUNT [id number]   Basophil Abs           0.03 10e9/L  Unknown

 

          GFR CALC  9522401   GFR Non Afr Amr           53 mL/min 2018 Unk

nown

 

          GFR CALC  7717974   GFR Afr Amr           >60 mL/min 2018 Unknow

n

 

          GLYCOSYLATED HEMOGLOBIN TEST 16308     Hgb A1c   48987-0   5.4 %     0

2018 Unknown

 

          MEAN GLUC 9480877   Calc Mean Gluc           108 mg/dL 2018 Unkn

own

 

          MEAN GLUC 5300721   Calc Mean Gluc           114 mg/dL 2017 Unkn

own

 

          LIPID GROUP 46289     Cholesterol           146 mg/dL 2017 Unkno

wn

 

          LIPID GROUP 94613     Triglyceride           119 mg/dL 2017 Unkn

own

 

          LIPID GROUP 62953     HDL CHOLESTEROL           47 mg/dL  2017 U

nknown

 

          LIPID GROUP 62333     Chol/HDL Ratio           3.11 ratio 2017 U

nknown

 

          LIPID GROUP 54947     NON-HDL Chol           99 mg/dL  2017 Unkn

own

 

          LIPID GROUP 57456     LDL Cholesterol           75 mg/dL  2017 U

nknown

 

          GLYCOSYLATED HEMOGLOBIN TEST 19968     Hgb A1c   92230-8   5.6 %     0

2017 Unknown

 

          COMPREHENSIVE METABOLIC 02708     AST                 22 U/L    2017 Unknown

 

          COMPREHENSIVE METABOLIC 28935     ALT                 12 U/L    2017 Unknown

 

          COMPREHENSIVE METABOLIC 65901     BUN                 17 mg/dL  2017 Unknown

 

          COMPREHENSIVE METABOLIC 06679     ALBUMIN             4.0 g/dL  2017 Unknown

 

          COMPREHENSIVE METABOLIC 07883     CHLORIDE            110 mmol/L  Unknown

 

          COMPREHENSIVE METABOLIC 23644     Bili Total           0.4 mg/dL  Unknown

 

          COMPREHENSIVE METABOLIC 45659     ALK PHOS            63 U/L    2017 Unknown

 

          COMPREHENSIVE METABOLIC 30346     SODIUM              140 mmol/L  Unknown

 

          COMPREHENSIVE METABOLIC 74744     CREATININE           1.05 mg/dL  Unknown

 

          COMPREHENSIVE METABOLIC 55964     CALCIUM             9.2 mg/dL 2017 Unknown

 

          COMPREHENSIVE METABOLIC 96943     POTASSIUM           4.2 mmol/L  Unknown

 

          COMPREHENSIVE METABOLIC 26111     Total Protein           6.2 g/dL   Unknown

 

          COMPREHENSIVE METABOLIC 68930     Glucose             87 mg/dL  2017 Unknown

 

          COMPREHENSIVE METABOLIC 04384     Bicarbonate           24 mmol/L  Unknown

 

          COMPREHENSIVE METABOLIC 94517     AGAP                6 mmol/L  2017 Unknown

 

          GFR CALC  9924625   GFR Non Afr Amr           51 mL/min 2017 Unk

nown

 

          GFR CALC  0396455   GFR Afr Amr           >60 mL/min 2017 Unknow

n

 

          COMPLETE BLOOD COUNT [id number]   WBC                 6.7 10e9/L 20

17 Unknown

 

          COMPLETE BLOOD COUNT [id number]   RBC                 4.04 10e12/L 2017 Unknown

 

          COMPLETE BLOOD COUNT [id number]   HEMOGLOBIN           12.1 g/dL 20

17 Unknown

 

          COMPLETE BLOOD COUNT [id number]   HEMATOCRIT           38.0 %    20

17 Unknown

 

          COMPLETE BLOOD COUNT [id number]   MCV                 94.1 fL   

7 Unknown

 

          COMPLETE BLOOD COUNT [id number]   MCH                 30.0 pg   

7 Unknown

 

          COMPLETE BLOOD COUNT [id number]   MCHC                31.8 g/dL 

7 Unknown

 

          COMPLETE BLOOD COUNT [id number]   PLATELET COUNT           206 10e9/L  Unknown

 

          COMPLETE BLOOD COUNT [id number]   Mean Plt Volume           11.3 fL    Unknown

 

          COMPLETE BLOOD COUNT [id number]   Neut Auto           35.8 %    

7 Unknown

 

          COMPLETE BLOOD COUNT [id number]   Lymph Auto           51.6 %    20

17 Unknown

 

          COMPLETE BLOOD COUNT [id number]   Mono Auto           8.8 %     

7 Unknown

 

          COMPLETE BLOOD COUNT [id number]   RDW                 13.5 %    

7 Unknown

 

          COMPLETE BLOOD COUNT [id number]   Eos Auto            3.4 %     

7 Unknown

 

          COMPLETE BLOOD COUNT [id number]   Baso Auto           0.4 %     

7 Unknown

 

          COMPLETE BLOOD COUNT [id number]   Neutrophil Abs           2.40 10e9/L  Unknown

 

          COMPLETE BLOOD COUNT [id number]   Lymphocyte Abs           3.46 10e9/L  Unknown

 

          COMPLETE BLOOD COUNT [id number]   Monocyte Abs           0.59 10e9/L  Unknown

 

          COMPLETE BLOOD COUNT [id number]   Eosinophil Abs           0.23 10e9/L  Unknown

 

          COMPLETE BLOOD COUNT [id number]   RDW-SD              45.3 fL   

7 Unknown

 

          COMPLETE BLOOD COUNT [id number]   Basophil Abs           0.03 10e9/L  Unknown

 

          THYROID STIMULATING HORMONE 66070     TSH                 1.981 uIU/mL

 2017 Unknown

 

          COMPLETE BLOOD COUNT [id number]   WBC                 6.0 10e9/L 20

17 Unknown

 

          COMPLETE BLOOD COUNT [id number]   RBC                 4.29 10e12/L 2017 Unknown

 

          COMPLETE BLOOD COUNT [id number]   HEMOGLOBIN           12.9 g/dL 20

17 Unknown

 

          COMPLETE BLOOD COUNT [id number]   HEMATOCRIT           38.4 %    20

17 Unknown

 

          COMPLETE BLOOD COUNT [id number]   MCV                 89.5 fL   

7 Unknown

 

          COMPLETE BLOOD COUNT [id number]   MCH                 30.1 pg   

7 Unknown

 

          COMPLETE BLOOD COUNT [id number]   MCHC                33.6 g/dL 

7 Unknown

 

          COMPLETE BLOOD COUNT [id number]   PLATELET COUNT           181 10e9/L 2017 Unknown

 

          COMPLETE BLOOD COUNT [id number]   Mean Plt Volume           11.7 fL   2017 Unknown

 

          COMPLETE BLOOD COUNT [id number]   Neut Auto           36.9 %    

7 Unknown

 

          COMPLETE BLOOD COUNT [id number]   Lymph Auto           50.4 %    20

17 Unknown

 

          COMPLETE BLOOD COUNT [id number]   Mono Auto           9.0 %     

7 Unknown

 

          COMPLETE BLOOD COUNT [id number]   RDW                 13.7 %    

7 Unknown

 

          COMPLETE BLOOD COUNT [id number]   Eos Auto            3.4 %     

7 Unknown

 

          COMPLETE BLOOD COUNT [id number]   Baso Auto           0.3 %     

7 Unknown

 

          COMPLETE BLOOD COUNT [id number]   Neutrophil Abs           2.21 10e9/L  Unknown

 

          COMPLETE BLOOD COUNT [id number]   Lymphocyte Abs           3.02 10e9/L  Unknown

 

          COMPLETE BLOOD COUNT [id number]   Monocyte Abs           0.54 10e9/L 2017 Unknown

 

          COMPLETE BLOOD COUNT [id number]   Eosinophil Abs           0.20 10e9/L  Unknown

 

          COMPLETE BLOOD COUNT [id number]   RDW-SD              44.0 fL   

7 Unknown

 

          COMPLETE BLOOD COUNT [id number]   Basophil Abs           0.02 10e9/L 2017 Unknown

 

          GLYCOSYLATED HEMOGLOBIN TEST 17697     Hgb A1c   00237-8   5.4 %     0

3/09/2017 Unknown

 

          THYROID STIMULATING HORMONE 18460     TSH                 2.200 uIU/mL

 2017 Unknown

 

          GFR CALC  7034326   GFR Non Afr Amr           50 mL/min 2017 Unk

nown

 

          GFR CALC  7304345   GFR Afr Amr           >60 mL/min 2017 Unknow

n

 

          MEAN GLUC 3044425   Calc Mean Gluc           108 mg/dL 2017 Unkn

own

 

          COMPREHENSIVE METABOLIC 82012     AST                 18 U/L    2017 Unknown

 

          COMPREHENSIVE METABOLIC 31274     ALT                 10 U/L    2017 Unknown

 

          COMPREHENSIVE METABOLIC 69351     BUN                 20 mg/dL  2017 Unknown

 

          COMPREHENSIVE METABOLIC 22925     ALBUMIN             4.1 g/dL  2017 Unknown

 

          COMPREHENSIVE METABOLIC 64881     CHLORIDE            109 mmol/L  Unknown

 

          COMPREHENSIVE METABOLIC 67105     Bili Total           0.6 mg/dL  Unknown

 

          COMPREHENSIVE METABOLIC 03198     ALK PHOS            64 U/L    2017 Unknown

 

          COMPREHENSIVE METABOLIC 75446     SODIUM              141 mmol/L  Unknown

 

          COMPREHENSIVE METABOLIC 70089     CREATININE           1.06 mg/dL 2017 Unknown

 

          COMPREHENSIVE METABOLIC 12524     CALCIUM             9.9 mg/dL 2017 Unknown

 

          COMPREHENSIVE METABOLIC 01410     POTASSIUM           4.2 mmol/L  Unknown

 

          COMPREHENSIVE METABOLIC 75850     Total Protein           6.3 g/dL   Unknown

 

          COMPREHENSIVE METABOLIC 62684     Glucose             99 mg/dL  2017 Unknown

 

          COMPREHENSIVE METABOLIC 84050     Bicarbonate           21 mmol/L 2017 Unknown

 

          COMPREHENSIVE METABOLIC 06452     AGAP                11 mmol/L 2017 Unknown

 

          LIPID GROUP [id number]     Cholesterol           169 mg/dL 2016 Unkno

wn

 

          LIPID GROUP [id number]     Triglyceride           165 mg/dL 2016 Unkn

own

 

          LIPID GROUP [id number]     HDL CHOLESTEROL           43 mg/dL  2016 U

nknown

 

          LIPID GROUP [id number]     Chol/HDL Ratio           3.93 ratio 2016 U

nknown

 

          LIPID GROUP 99063     NON-HDL Chol           126 mg/dL 2016 Unkn

own

 

          LIPID GROUP 48856     LDL Cholesterol           93 mg/dL  2016 U

nknown

 

          COMPREHENSIVE METABOLIC 16061     AST                 18 U/L    2016 Unknown

 

          COMPREHENSIVE METABOLIC 13820     ALT                 10 U/L    2016 Unknown

 

          COMPREHENSIVE METABOLIC 25077     BUN                 20 mg/dL  2016 Unknown

 

          COMPREHENSIVE METABOLIC 45595     ALBUMIN             3.9 g/dL  2016 Unknown

 

          COMPREHENSIVE METABOLIC 35386     CHLORIDE            110 mmol/L  Unknown

 

          COMPREHENSIVE METABOLIC 25187     Bili Total           0.5 mg/dL  Unknown

 

          COMPREHENSIVE METABOLIC 76288     ALK PHOS            72 U/L    2016 Unknown

 

          COMPREHENSIVE METABOLIC 38818     SODIUM              141 mmol/L  Unknown

 

          COMPREHENSIVE METABOLIC 80637     CREATININE           1.12 mg/dL  Unknown

 

          COMPREHENSIVE METABOLIC 09982     CALCIUM             9.7 mg/dL 2016 Unknown

 

          COMPREHENSIVE METABOLIC 23633     POTASSIUM           4.4 mmol/L  Unknown

 

          COMPREHENSIVE METABOLIC 07725     Total Protein           6.2 g/dL   Unknown

 

          COMPREHENSIVE METABOLIC 52756     Glucose             90 mg/dL  2016 Unknown

 

          COMPREHENSIVE METABOLIC 56917     Bicarbonate           23 mmol/L  Unknown

 

          COMPREHENSIVE METABOLIC 11396     AGAP                8 mmol/L  2016 Unknown

 

          GFR CALC  6638179   GFR Non Afr Amr           47 mL/min 2016 Unk

nown

 

          GFR CALC  3112060   GFR Afr Amr           57 mL/min 2016 Unknown

 

          GLYCOSYLATED HEMOGLOBIN TEST 91251     Hgb A1c   90840-6   5.5 %     0

2016 Unknown

 

          THYROID STIMULATING HORMONE 40475     TSH                 2.537 uIU/mL

 2016 Unknown

 

          FREE T4   79570     T4 Free             1.36 ng/dL 2016 Unknown

 

          COMPLETE BLOOD COUNT 8589863   WBC                 6.8 10e9/L 20

16 Unknown

 

          COMPLETE BLOOD COUNT [id number]   RBC                 4.20 10e12/L 2016 Unknown

 

          COMPLETE BLOOD COUNT 4387520   HEMOGLOBIN           12.5 g/dL 20

16 Unknown

 

          COMPLETE BLOOD COUNT 0158852   HEMATOCRIT           38.0 %    20

16 Unknown

 

          COMPLETE BLOOD COUNT 3897007   MCV                 90.5 fL   

6 Unknown

 

          COMPLETE BLOOD COUNT [id number]   MCH                 29.8 pg   

6 Unknown

 

          COMPLETE BLOOD COUNT 7646506   MCHC                32.9 g/dL 

6 Unknown

 

          COMPLETE BLOOD COUNT 2063835   PLATELET COUNT           197 10e9/L  Unknown

 

          COMPLETE BLOOD COUNT 7313836   Mean Plt Volume           11.7 fL    Unknown

 

          COMPLETE BLOOD COUNT 9190933   Neut Auto           41.3 %    

6 Unknown

 

          COMPLETE BLOOD COUNT 8361784   Lymph Auto           47.1 %    20

16 Unknown

 

          COMPLETE BLOOD COUNT [id number]   Mono Auto           7.8 %     

6 Unknown

 

          COMPLETE BLOOD COUNT 3403772   RDW                 13.8 %    

6 Unknown

 

          COMPLETE BLOOD COUNT [id number]   Eos Auto            3.4 %     

6 Unknown

 

          COMPLETE BLOOD COUNT [id number]   Baso Auto           0.4 %     

6 Unknown

 

          COMPLETE BLOOD COUNT 8617755   Neutrophil Abs           2.81 10e9/L  Unknown

 

          COMPLETE BLOOD COUNT 5489125   Lymphocyte Abs           3.20 10e9/L  Unknown

 

          COMPLETE BLOOD COUNT 7320113   Monocyte Abs           0.53 10e9/L  Unknown

 

          COMPLETE BLOOD COUNT [id number]   Eosinophil Abs           0.23 10e9/L  Unknown

 

          COMPLETE BLOOD COUNT 8496479   RDW-SD              44.4 fL   

6 Unknown

 

          COMPLETE BLOOD COUNT 6487663   Basophil Abs           0.03 10e9/L  Unknown

 

          MEAN GLUC 3137633   Calc Mean Gluc           111 mg/dL 2016 Unkn

own

 

          [place] [address]     Glucose             89 MG/DL   Unknown

 

          [place] [address]     CREATININE           1.12 MG/DL  Unknown

 

          [place] [address]     BUN                 20 MG/DL   Unknown

 

          [place] [address]     SODIUM              139 MMOL/L  Unknown

 

          [place] [address]     POTASSIUM           4.6 MMOL/L  Unknown

 

          [place] [address]     CHLORIDE            108 MMOL/L  Unknown

 

          [place] [address]     BICARB              26 MMOL/L  Unknown

 

          [place] [address]     ANION GAP           5 MEQ/L    Unknown

 

          [place] [address]     CALCIUM             10.0 MG/DL  Unknown

 

          GFR CALC  4766743   GFR AA              57.0L ML/MIN 2015 Unknow

n

 

          GFR CALC  3661279   GFR NON-AA           47.0L ML/MIN 2015 Unkno

wn

 

          THYROID STIMULATING HORMONE 33701     TSH                 2.378 uIU/ML

 09/10/2015 Unknown

 

          COMPLETE BLOOD COUNT 6957944   WBC                 6.4 10e9/L 09/10/20

15 Unknown

 

          COMPLETE BLOOD COUNT [id number]   RBC                 3.99 10e12/L 09/10/

2015 Unknown

 

          COMPLETE BLOOD COUNT 7100255   HGB                 11.9 g/dL 09/10/201

5 Unknown

 

          COMPLETE BLOOD COUNT 7426626   HCT DET             36.9 %    09/10/201

5 Unknown

 

          COMPLETE BLOOD COUNT 3221037   MCV                 92.5 fL   09/10/201

5 Unknown

 

          COMPLETE BLOOD COUNT [id number]   MCH                 29.8 pg   09/10/201

5 Unknown

 

          COMPLETE BLOOD COUNT [id number]   MCHC                32.2 g/dL 09/10/201

5 Unknown

 

          COMPLETE BLOOD COUNT [id number]   PLT                 172 10e9/L 09/10/20

15 Unknown

 

          COMPLETE BLOOD COUNT 5203217   MPV                 11.7 fL   09/10/201

5 Unknown

 

          COMPLETE BLOOD COUNT [id number]   ARIK %               40.4 %    09/10/201

5 Unknown

 

          COMPLETE BLOOD COUNT [id number]   LY %                48.0 %    09/10/201

5 Unknown

 

          COMPLETE BLOOD COUNT [id number]   MON %               8.3 %     09/10/201

5 Unknown

 

          COMPLETE BLOOD COUNT [id number]   EOS  %              2.8 %     09/10/201

5 Unknown

 

          COMPLETE BLOOD COUNT [id number]   BASO %              0.5 %     09/10/201

5 Unknown

 

          COMPLETE BLOOD COUNT [id number]   RDW                 13.6 %    09/10/201

5 Unknown

 

          COMPLETE BLOOD COUNT [id number]   ABS ARIK             2.59 10e9/L 09/10/2

015 Unknown

 

          COMPLETE BLOOD COUNT [id number]   ABS LYMPH           3.07 10e9/L 09/10/2

015 Unknown

 

          COMPLETE BLOOD COUNT [id number]   ABS MONO            0.53 10e9/L 09/10/2

015 Unknown

 

          COMPLETE BLOOD COUNT [id number]   ABS EOS             0.18 10e9/L 09/10/2

015 Unknown

 

          COMPLETE BLOOD COUNT [id number]   ABS BASO            0.03 10e9/L 09/10/2

015 Unknown

 

          COMPLETE BLOOD COUNT [id number]   RDW-SD              44.9 fL   09/10/201

5 Unknown

 

          LIPID GROUP [id number]     HDL TEST            42 MG/DL  09/10/2015 Unknown

 

          LIPID GROUP [id number]     TRIG                177 MG/DL 09/10/2015 Unknown

 

          LIPID GROUP 11241     TEST LDL            72 MG/DL  09/10/2015 Unknown

 

          LIPID GROUP 60911     CHOL                149 MG/DL 09/10/2015 Unknown

 

          LIPID GROUP 65137     RCHOL/HDL           3.55 RATIO 09/10/2015 Unknow

n

 

          LIPID GROUP 17606     NON-HDL CH           107 MG/DL 09/10/2015 Unknow

n

 

          GLYCOSYLATED HEMOGLOBIN TEST 79720     A1C HPLC  81993-9   5.5 %     0

9/10/2015 Unknown

 

          FREE T4   94900     FREE T4             1.39 NG/DL 09/10/2015 Unknown

 

          GFR CALC  8373922   GFR AA              55.0L ML/MIN 09/10/2015 Unknow

n

 

          GFR CALC  0832476   GFR NON-AA           46.0L ML/MIN 09/10/2015 Unkno

wn

 

          COMPREHENSIVE METABOLIC 04284     AST                 17 U/L    09/10/

2015 Unknown

 

          COMPREHENSIVE METABOLIC 90646     ALT                 10 IU/L   09/10/

2015 Unknown

 

          COMPREHENSIVE METABOLIC 89410     BUN                 20 MG/DL  09/10/

2015 Unknown

 

          COMPREHENSIVE METABOLIC 83581     ALBUMIN             3.9 GM/DL 09/10/

2015 Unknown

 

          COMPREHENSIVE METABOLIC 67488     CHLORIDE            111 MMOL/L 09/10

/2015 Unknown

 

          COMPREHENSIVE METABOLIC 69109     BILI TOT            0.4 MG/DL 09/10/

2015 Unknown

 

          COMPREHENSIVE METABOLIC 92881     ALK PHOS            70 U/L    09/10/

2015 Unknown

 

          COMPREHENSIVE METABOLIC 36933     SODIUM              142 MMOL/L 09/10

/2015 Unknown

 

          COMPREHENSIVE METABOLIC 10272     CREATININE           1.16 MG/DL  Unknown

 

          COMPREHENSIVE METABOLIC 97461     CALCIUM             9.4 MG/DL 09/10/

2015 Unknown

 

          COMPREHENSIVE METABOLIC 34104     POTASSIUM           4.6 MMOL/L 09/10

/2015 Unknown

 

          COMPREHENSIVE METABOLIC 76263     PROT TOT            6.2 GM/DL 09/10/

2015 Unknown

 

          COMPREHENSIVE METABOLIC 85110     Glucose             90 MG/DL  09/10/

2015 Unknown

 

          COMPREHENSIVE METABOLIC 19705     BICARB              24 MMOL/L 09/10/

2015 Unknown

 

          COMPREHENSIVE METABOLIC 84053     ANION GAP           7 MEQ/L   09/10/

2015 Unknown

 

          THYROID STIMULATING HORMONE 37434     TSH                 2.427 uIU/ML

 2015 Unknown

 

          LIPID GROUP 10313     HDL TEST            47 MG/DL  2015 Unknown

 

          LIPID GROUP 27676     TRIG                145 MG/DL 2015 Unknown

 

          LIPID GROUP 06767     TEST LDL            73 MG/DL  2015 Unknown

 

          LIPID GROUP 34886     CHOL                149 MG/DL 2015 Unknown

 

          LIPID GROUP 13673     RCHOL/HDL           3.17 RATIO 2015 Unknow

n

 

          LIPID GROUP 70726     NON-HDL CH           102 MG/DL 2015 Unknow

n

 

          COMPREHENSIVE METABOLIC 95065     AST                 17 U/L    2015 Unknown

 

          COMPREHENSIVE METABOLIC 38085     ALT                 9 IU/L    2015 Unknown

 

          COMPREHENSIVE METABOLIC 49844     BUN                 19 MG/DL  2015 Unknown

 

          COMPREHENSIVE METABOLIC 16566     ALBUMIN             4.3 GM/DL 2015 Unknown

 

          COMPREHENSIVE METABOLIC 73366     CHLORIDE            108 MMOL/L  Unknown

 

          COMPREHENSIVE METABOLIC 21759     BILI TOT            0.5 MG/DL 2015 Unknown

 

          COMPREHENSIVE METABOLIC 27724     ALK PHOS            68 U/L    2015 Unknown

 

          COMPREHENSIVE METABOLIC 23098     SODIUM              140 MMOL/L  Unknown

 

          COMPREHENSIVE METABOLIC 33889     CREATININE           1.08 MG/DL 2015 Unknown

 

          COMPREHENSIVE METABOLIC 85332     CALCIUM             9.9 MG/DL 2015 Unknown

 

          COMPREHENSIVE METABOLIC 19405     POTASSIUM           4.3 MMOL/L  Unknown

 

          COMPREHENSIVE METABOLIC 51487     PROT TOT            7.2 GM/DL 2015 Unknown

 

          COMPREHENSIVE METABOLIC 12968     Glucose             94 MG/DL  2015 Unknown

 

          COMPREHENSIVE METABOLIC 45777     BICARB              26 MMOL/L 2015 Unknown

 

          COMPREHENSIVE METABOLIC 61473     ANION GAP           6 MEQ/L   2015 Unknown

 

          GFR CALC  0111893   GFR AA              60.0L ML/MIN 2015 Unknow

n

 

          GFR CALC  8540199   GFR NON-AA           49.0L ML/MIN 2015 Unkno

wn

 

          GLYCOSYLATED HEMOGLOBIN TEST 45226     A1C HPLC  26088-9   5.6 %     0

3/06/2015 Unknown

 

          COMPLETE BLOOD COUNT 3610016   WBC                 7.2 10e9/L 20

15 Unknown

 

          COMPLETE BLOOD COUNT 2305076   RBC                 4.28 10e12/L 2015 Unknown

 

          COMPLETE BLOOD COUNT 1385584   HGB                 12.8 g/dL 

5 Unknown

 

          COMPLETE BLOOD COUNT 4040537   HCT DET             39.3 %    

5 Unknown

 

          COMPLETE BLOOD COUNT 6907814   MCV                 91.8 fL   

5 Unknown

 

          COMPLETE BLOOD COUNT 0957089   MCH                 29.9 pg   

5 Unknown

 

          COMPLETE BLOOD COUNT 0910246   MCHC                32.6 g/dL 

5 Unknown

 

          COMPLETE BLOOD COUNT [id number]   PLT                 189 10e9/L 20

15 Unknown

 

          COMPLETE BLOOD COUNT 1079062   MPV                 11.2 fL   

5 Unknown

 

          COMPLETE BLOOD COUNT [id number]   ARIK %               38.0 %    

5 Unknown

 

          COMPLETE BLOOD COUNT 7140480   LY %                51.0 %    

5 Unknown

 

          COMPLETE BLOOD COUNT 0185206   MON %               7.7 %     

5 Unknown

 

          COMPLETE BLOOD COUNT 7992274   EOS  %              2.9 %     

5 Unknown

 

          COMPLETE BLOOD COUNT 8059354   BASO %              0.4 %     

5 Unknown

 

          COMPLETE BLOOD COUNT 7861065   RDW                 14.0 %    

5 Unknown

 

          COMPLETE BLOOD COUNT 3093917   ABS ARIK             2.74 10e9/L 

015 Unknown

 

          COMPLETE BLOOD COUNT 8256915   ABS LYMPH           3.67 10e9/L 

015 Unknown

 

          COMPLETE BLOOD COUNT [id number]   ABS MONO            0.55 10e9/L 

015 Unknown

 

          COMPLETE BLOOD COUNT [id number]   ABS EOS             0.21 10e9/L 

015 Unknown

 

          COMPLETE BLOOD COUNT 8996468   ABS BASO            0.03 10e9/L 

015 Unknown

 

          COMPLETE BLOOD COUNT 1830298   RDW-SD              46.1 fL   

5 Unknown

 

          FREE T4   02395     FREE T4             1.14 NG/DL 2015 Unknown

 

          GLYCOSYLATED HEMOGLOBIN TEST 77614     A1C HPLC  12219-6   5.2 %     0

2014 Unknown

 

          FREE T4   18894     FREE T4             1.40 NG/DL 2014 Unknown

 

          GFR CALC  8930725   GFR AA              >60 ML/MIN 2014 Unknown

 

          GFR CALC  0719737   GFR NON-AA           52.0L ML/MIN 2014 Unkno

wn

 

          COMPREHENSIVE METABOLIC 28525     AST                 15 U/L    2014 Unknown

 

          COMPREHENSIVE METABOLIC 82042     ALT                 9 IU/L    2014 Unknown

 

          COMPREHENSIVE METABOLIC 72142     BUN                 17 MG/DL  2014 Unknown

 

          COMPREHENSIVE METABOLIC 70194     ALBUMIN             4.0 GM/DL 2014 Unknown

 

          COMPREHENSIVE METABOLIC 31874     CHLORIDE            112 MMOL/L  Unknown

 

          COMPREHENSIVE METABOLIC 62956     BILI TOT            0.5 MG/DL 2014 Unknown

 

          COMPREHENSIVE METABOLIC 53761     ALK PHOS            66 U/L    2014 Unknown

 

          COMPREHENSIVE METABOLIC 65046     SODIUM              140 MMOL/L  Unknown

 

          COMPREHENSIVE METABOLIC 87156     CREATININE           1.03 MG/DL  Unknown

 

          COMPREHENSIVE METABOLIC 46169     CALCIUM             9.5 MG/DL 2014 Unknown

 

          COMPREHENSIVE METABOLIC 98113     POTASSIUM           4.1 MMOL/L  Unknown

 

          COMPREHENSIVE METABOLIC 97701     PROT TOT            6.2 GM/DL 2014 Unknown

 

          COMPREHENSIVE METABOLIC 05190     Glucose             102 MG/DL 2014 Unknown

 

          COMPREHENSIVE METABOLIC 85064     BICARB              23 MMOL/L 2014 Unknown

 

          COMPREHENSIVE METABOLIC 14012     ANION GAP           5 MEQ/L   2014 Unknown

 

          THYROID STIMULATING HORMONE 83922     TSH                 2.074 uIU/ML

 2014 Unknown

 

          VITAMIN B 12 FOLIC ACID [id number] VIT B 12            423 PG/ML  Unknown

 

          VITAMIN B 12 FOLIC ACID 49955|[id number] FOLIC ACID           19.7 NG/ML  Unknown

 

          LIPID GROUP [id number]     HDL TEST            40 MG/DL  2014 Unknown

 

          LIPID GROUP 55115     TRIG                145 MG/DL 2014 Unknown

 

          LIPID GROUP 42425     TEST LDL            81 MG/DL  2014 Unknown

 

          LIPID GROUP 56950     CHOL                150 MG/DL 2014 Unknown

 

          LIPID GROUP 78418     RCHOL/HDL           3.75 RATIO 2014 Unknow

n

 

          COMPLETE BLOOD COUNT 9148505   WBC                 6.0 10e9/L 20

14 Unknown

 

          COMPLETE BLOOD COUNT 9888651   RBC                 4.26 10e12/L 2014 Unknown

 

          COMPLETE BLOOD COUNT 7558028   HGB                 12.7 g/dL 

4 Unknown

 

          COMPLETE BLOOD COUNT [id number]   HCT DET             38.7 %    

4 Unknown

 

          COMPLETE BLOOD COUNT 7951825   MCV                 90.8 fL   

4 Unknown

 

          COMPLETE BLOOD COUNT 2982060   MCH                 29.8 pg   

4 Unknown

 

          COMPLETE BLOOD COUNT [id number]   MCHC                32.8 g/dL 

4 Unknown

 

          COMPLETE BLOOD COUNT [id number]   PLT                 178 10e9/L 20

14 Unknown

 

          COMPLETE BLOOD COUNT 7571277   MPV                 11.7 fL   

4 Unknown

 

          COMPLETE BLOOD COUNT 2241004   ARIK %               30.5 %    

4 Unknown

 

          COMPLETE BLOOD COUNT 6239362   LY %                55.4 %    

4 Unknown

 

          COMPLETE BLOOD COUNT [id number]   MON %               9.0 %     

4 Unknown

 

          COMPLETE BLOOD COUNT 8187549   EOS  %              4.4 %     

4 Unknown

 

          COMPLETE BLOOD COUNT 7093912   BASO %              0.7 %     

4 Unknown

 

          COMPLETE BLOOD COUNT [id number]   RDW                 13.3 %    

4 Unknown

 

          COMPLETE BLOOD COUNT 8908755   ABS ARIK             1.83 10e9/L 

014 Unknown

 

          COMPLETE BLOOD COUNT [id number]   ABS LYMPH           3.32 10e9/L 

014 Unknown

 

          COMPLETE BLOOD COUNT [id number]   ABS MONO            0.54 10e9/L 

014 Unknown

 

          COMPLETE BLOOD COUNT 3705210   ABS EOS             0.26 10e9/L 

014 Unknown

 

          COMPLETE BLOOD COUNT 3584251   ABS BASO            0.04 10e9/L 

014 Unknown

 

          COMPLETE BLOOD COUNT [id number]   RDW-SD              43.2 fL   

4 Unknown

 

          HEMOGLOBIN A1C (GLYCOSYLATED) 9780265   A1C HPLC  96225-8   5.5 %     

2012 Unknown

 

          COMPLETE BLOOD COUNT [id number]   WBC                 6.0 10e9/L 20

12 Unknown

 

          COMPLETE BLOOD COUNT 7195316   RBC                 4.22 10e12/L 2012 Unknown

 

          COMPLETE BLOOD COUNT 6344222   HGB                 12.4 g/dL 

2 Unknown

 

          COMPLETE BLOOD COUNT [id number]   HCT DET             38.2 %    

2 Unknown

 

          COMPLETE BLOOD COUNT 5402868   MCV                 90.5 fL   

2 Unknown

 

          COMPLETE BLOOD COUNT [id number]   MCH                 29.4 pg   

2 Unknown

 

          COMPLETE BLOOD COUNT [id number]   MCHC                32.5 g/dL 

2 Unknown

 

          COMPLETE BLOOD COUNT [id number]   PLT                 187 10e9/L 20

12 Unknown

 

          COMPLETE BLOOD COUNT [id number]   MPV                 11.5 fL   

2 Unknown

 

          COMPLETE BLOOD COUNT 5506099   ARIK %               36.4 %    

2 Unknown

 

          COMPLETE BLOOD COUNT [id number]   LY %                51.0 %    

2 Unknown

 

          COMPLETE BLOOD COUNT 8606506   MON %               8.7 %     

2 Unknown

 

          COMPLETE BLOOD COUNT [id number]   EOS  %              3.2 %     

2 Unknown

 

          COMPLETE BLOOD COUNT 7637847   BASO %              0.7 %     

2 Unknown

 

          COMPLETE BLOOD COUNT [id number]   RDW                 13.7 %    

2 Unknown

 

          COMPLETE BLOOD COUNT [id number]   ABS ARIK             2.18 10e9/L 

012 Unknown

 

          COMPLETE BLOOD COUNT [id number]   ABS LYMPH           3.06 10e9/L 

012 Unknown

 

          COMPLETE BLOOD COUNT [id number]   ABS MONO            0.52 10e9/L 

012 Unknown

 

          COMPLETE BLOOD COUNT [id number]   ABS EOS             0.19 10e9/L 

012 Unknown

 

          COMPLETE BLOOD COUNT [id number]   ABS BASO            0.04 10e9/L 

012 Unknown

 

          COMPLETE BLOOD COUNT [id number]   RDW-SD              44.3 fL   

2 Unknown

 

          LIPID GROUP [id number]     HDL TEST            42 MG/DL  2012 Unknown

 

          LIPID GROUP 92038     TRIG                156 MG/DL 2012 Unknown

 

          LIPID GROUP 28339     TEST LDL            80 MG/DL  2012 Unknown

 

          LIPID GROUP 06469     CHOL                153 MG/DL 2012 Unknown

 

          LIPID GROUP 99691     RCHOL/HDL           3.64 RATIO 2012 Unknow

n

 

          FREE T4   82628     FREE T4             1.22 NG/DL 2012 Unknown

 

          COMPREHENSIVE METABOLIC 77733     AST                 20 U/L    2012 Unknown

 

          COMPREHENSIVE METABOLIC 84059     ALT                 11 IU/L   2012 Unknown

 

          COMPREHENSIVE METABOLIC 82128     BUN                 19 MG/DL  2012 Unknown

 

          COMPREHENSIVE METABOLIC 82659     ALBUMIN             4.3 GM/DL 2012 Unknown

 

          COMPREHENSIVE METABOLIC 40134     CHLORIDE            109 MMOL/L  Unknown

 

          COMPREHENSIVE METABOLIC 98846     BILI TOT            0.6 MG/DL 2012 Unknown

 

          COMPREHENSIVE METABOLIC 39727     ALK PHOS            84 U/L    2012 Unknown

 

          COMPREHENSIVE METABOLIC 81149     SODIUM              142 MMOL/L  Unknown

 

          COMPREHENSIVE METABOLIC 39058     CREATININE           1.09 MG/DL  Unknown

 

          COMPREHENSIVE METABOLIC 38137     CALCIUM             9.8 MG/DL 2012 Unknown

 

          COMPREHENSIVE METABOLIC 67722     POTASSIUM           4.2 MMOL/L  Unknown

 

          COMPREHENSIVE METABOLIC 12233     PROT TOT            6.4 GM/DL 2012 Unknown

 

          COMPREHENSIVE METABOLIC 40350     Glucose             89 MG/DL  2012 Unknown

 

          COMPREHENSIVE METABOLIC 47982     BICARB              25 MMOL/L 2012 Unknown

 

          COMPREHENSIVE METABOLIC 02212     ANION GAP           8 MEQ/L   2012 Unknown

 

          GFR CALC  [id number]   GFR AA              60.0L ML/MIN 2012 Unknow

n

 

          GFR CALC  2446867   GFR NON-AA           49.0L ML/MIN 2012 Unkno

wn

 

          THYROID STIMULATING HORMONE 78084     TSH                 2.450 uIU/ML

 2012 Unknown

 

          COMPREHENSIVE METABOLIC 92002     AST                 22 U/L    2012 Unknown

 

          COMPREHENSIVE METABOLIC 44778     ALT                 14 IU/L   2012 Unknown

 

          COMPREHENSIVE METABOLIC 79952     BUN                 21 MG/DL  2012 Unknown

 

          COMPREHENSIVE METABOLIC 75214     ALBUMIN             4.3 GM/DL 2012 Unknown

 

          COMPREHENSIVE METABOLIC 16720     CHLORIDE            106 MMOL/L  Unknown

 

          COMPREHENSIVE METABOLIC 90766     BILI TOT            0.4 MG/DL 2012 Unknown

 

          COMPREHENSIVE METABOLIC 22714     ALK PHOS            80 U/L    2012 Unknown

 

          COMPREHENSIVE METABOLIC 93071     SODIUM              141 MMOL/L  Unknown

 

          COMPREHENSIVE METABOLIC 21031     CREATININE           1.13 MG/DL  Unknown

 

          COMPREHENSIVE METABOLIC 83041     CALCIUM             9.4 MG/DL 2012 Unknown

 

          COMPREHENSIVE METABOLIC 51540     POTASSIUM           4.3 MMOL/L  Unknown

 

          COMPREHENSIVE METABOLIC 59378     PROT TOT            6.7 GM/DL 2012 Unknown

 

          COMPREHENSIVE METABOLIC 10740     Glucose             98 MG/DL  2012 Unknown

 

          COMPREHENSIVE METABOLIC 29397     BICARB              25 MMOL/L 2012 Unknown

 

          COMPREHENSIVE METABOLIC 10832     ANION GAP           10 MEQ/L  2012 Unknown

 

          LIPID GROUP 18066     HDL TEST            44 MG/DL  2012 Unknown

 

          LIPID GROUP 67487     TRIG                164 MG/DL 2012 Unknown

 

          LIPID GROUP [id number]     TEST LDL            98 MG/DL  2012 Unknown

 

          LIPID GROUP 47591     CHOL                175 MG/DL 2012 Unknown

 

          LIPID GROUP [id number]     RCHOL/HDL           3.98 RATIO 2012 Unknow

n

 

          COMPLETE BLOOD COUNT 76463     WBC                 6.7 10e9/L 20

12 Unknown

 

          COMPLETE BLOOD COUNT 65161     RBC                 4.36 10e12/L 2012 Unknown

 

          COMPLETE BLOOD COUNT 80586     HGB                 12.9 g/dL 

2 Unknown

 

          COMPLETE BLOOD COUNT 85339     HCT DET             39.4 %    

2 Unknown

 

          COMPLETE BLOOD COUNT 25636     MCV                 90.4 fL   

2 Unknown

 

          COMPLETE BLOOD COUNT 75341     MCH                 29.6 pg   

2 Unknown

 

          COMPLETE BLOOD COUNT 95611     MCHC                32.7 g/dL 

2 Unknown

 

          COMPLETE BLOOD COUNT 25870     PLT                 184 10e9/L 20

12 Unknown

 

          COMPLETE BLOOD COUNT 16719     MPV                 10.9 fL   

2 Unknown

 

          COMPLETE BLOOD COUNT 48414     ARIK %               41.5 %    

2 Unknown

 

          COMPLETE BLOOD COUNT 44132     LY %                45.7 %    

2 Unknown

 

          COMPLETE BLOOD COUNT 47649     MON %               9.4 %     

2 Unknown

 

          COMPLETE BLOOD COUNT 60087     EOS  %              3.0 %     

2 Unknown

 

          COMPLETE BLOOD COUNT 50469     BASO %              0.4 %     

2 Unknown

 

          COMPLETE BLOOD COUNT 88788     RDW                 13.2 %    

2 Unknown

 

          COMPLETE BLOOD COUNT 03395     ABS ARIK             2.78 10e9/L 

012 Unknown

 

          COMPLETE BLOOD COUNT 52644     ABS LYMPH           3.06 10e9/L 

012 Unknown

 

          COMPLETE BLOOD COUNT 07519     ABS MONO            0.63 10e9/L 

012 Unknown

 

          COMPLETE BLOOD COUNT 31544     ABS EOS             0.20 10e9/L 

012 Unknown

 

          COMPLETE BLOOD COUNT 05085     ABS BASO            0.03 10e9/L 

012 Unknown

 

          COMPLETE BLOOD COUNT 76686     RDW-SD              42.3 fL   

2 Unknown

 

          GFR CALC  2474774   GFR AA              57.0L ML/MIN 2012 Unknow

n

 

          GFR CALC  9196576   GFR NON-AA           47.0L ML/MIN 2012 Unkno

wn

 

          THYROID STIMULATING HORMONE 54452     TSH                 2.663 uIU/ML

 2012 Unknown

 

          FREE T4   42178     FREE T4             1.15 NG/DL 2012 Unknown

 

          THYROID STIMULATING HORMONE 13467     TSH                 1.908 uIU/ML

 2011 Unknown

 

          COMPLETE BLOOD COUNT 35259     WBC                 6.4 10e9/L 20

11 Unknown

 

          COMPLETE BLOOD COUNT 98999     RBC                 3.92 10e12/L 2011 Unknown

 

          COMPLETE BLOOD COUNT 41141     HGB                 11.8 g/dL 

1 Unknown

 

          COMPLETE BLOOD COUNT 00108     HCT DET             36.0 %    

1 Unknown

 

          COMPLETE BLOOD COUNT 15198     MCV                 91.8 fL   

1 Unknown

 

          COMPLETE BLOOD COUNT 35692     MCH                 30.1 pg   

1 Unknown

 

          COMPLETE BLOOD COUNT 00654     MCHC                32.8 g/dL 

1 Unknown

 

          COMPLETE BLOOD COUNT 18756     PLT                 176 10e9/L 20

11 Unknown

 

          COMPLETE BLOOD COUNT 16752     MPV                 11.4 fL   

1 Unknown

 

          COMPLETE BLOOD COUNT 08621     ARIK %               50.4 %    

1 Unknown

 

          COMPLETE BLOOD COUNT 89395     LY %                35.5 %    

1 Unknown

 

          COMPLETE BLOOD COUNT 40381     MON %               10.2 %    

1 Unknown

 

          COMPLETE BLOOD COUNT 47036     EOS  %              3.3 %     

1 Unknown

 

          COMPLETE BLOOD COUNT 32939     BASO %              0.6 %     

1 Unknown

 

          COMPLETE BLOOD COUNT 58367     RDW                 13.7 %    

1 Unknown

 

          COMPLETE BLOOD COUNT 73828     ABS ARIK             3.23 10e9/L 

011 Unknown

 

          COMPLETE BLOOD COUNT 02396     ABS LYMPH           2.27 10e9/L 

011 Unknown

 

          COMPLETE BLOOD COUNT 70995     ABS MONO            0.65 10e9/L 

011 Unknown

 

          COMPLETE BLOOD COUNT 68289     ABS EOS             0.21 10e9/L 

011 Unknown

 

          COMPLETE BLOOD COUNT 92024     ABS BASO            0.04 10e9/L 

011 Unknown

 

          COMPLETE BLOOD COUNT 44499     RDW-SD              45.3 fL   

1 Unknown

 

          GFR CALC  8800553   GFR AA              >60 ML/MIN 2011 Unknown

 

          GFR CALC  1291822   GFR NON-AA           53.0L ML/MIN 2011 Unkno

wn

 

          FREE T4   33087     FREE T4             1.20 NG/DL 2011 Unknown

 

          COMPREHENSIVE METABOLIC 49387     AST                 17 U/L    2011 Unknown

 

          COMPREHENSIVE METABOLIC 35162     ALT                 9 IU/L    2011 Unknown

 

          COMPREHENSIVE METABOLIC 56615     BUN                 16 MG/DL  2011 Unknown

 

          COMPREHENSIVE METABOLIC 17626     ALBUMIN             4.0 GM/DL 2011 Unknown

 

          COMPREHENSIVE METABOLIC 39060     CHLORIDE            108 MMOL/L  Unknown

 

          COMPREHENSIVE METABOLIC 16347     BILI TOT            0.5 MG/DL 2011 Unknown

 

          COMPREHENSIVE METABOLIC 46455     ALK PHOS            76 U/L    2011 Unknown

 

          COMPREHENSIVE METABOLIC 17742     SODIUM              139 MMOL/L  Unknown

 

          COMPREHENSIVE METABOLIC 72279     CREATININE           1.02 MG/DL 2011 Unknown

 

          COMPREHENSIVE METABOLIC 93536     CALCIUM             9.2 MG/DL 2011 Unknown

 

          COMPREHENSIVE METABOLIC 45221     POTASSIUM           4.5 MMOL/L  Unknown

 

          COMPREHENSIVE METABOLIC 49438     PROT TOT            6.1 GM/DL 2011 Unknown

 

          COMPREHENSIVE METABOLIC 10172     Glucose             93 MG/DL  2011 Unknown

 

          COMPREHENSIVE METABOLIC 78704     BICARB              26 MMOL/L 2011 Unknown

 

          COMPREHENSIVE METABOLIC 81161     ANION GAP           5 MEQ/L   2011 Unknown

 

          LIPID GROUP 49406     HDL TEST            46 MG/DL  2011 Unknown

 

          LIPID GROUP 13859     TRIG                102 MG/DL 2011 Unknown

 

          LIPID GROUP 23754     TEST LDL            88 MG/DL  2011 Unknown

 

          LIPID GROUP 65624     CHOL                154 MG/DL 2011 Unknown

 

          LIPID GROUP 27050     RCHOL/HDL           3.35 RATIO 2011 Unknow

n







Procedures





                    Procedure           Codes               Date

 

                    ROUTINE VENIPUNCTURE CPT-4: 41587        2019

 

                    LIPID PANEL         CPT-4: 51389        2019

 

                    FLU VACC PRSV FREE INC ANTIG 65 AND OLDER CPT-4: 42054      

  10/22/2019

 

                    FLU VACC PRSV FREE INC ANTIG 65 AND OLDER CPT-4: 11215      

  10/22/2019

 

                    ADMIN INFLUENZA VIRUS VAC CPT-4:         10/22/2019

 

                    COMPREHEN METABOLIC PANEL CPT-4: 13794        10/11/2019

 

                    ROUTINE VENIPUNCTURE CPT-4: 14436        10/11/2019

 

                    ROUTINE VENIPUNCTURE CPT-4: 60828        06/10/2019

 

                    ASSAY THYROID STIM HORMONE CPT-4: 26028        06/10/2019

 

                    COMPREHEN METABOLIC PANEL CPT-4: 60582        06/10/2019

 

                    COMPLETE CBC W/AUTO DIFF WBC CPT-4: 05120        06/10/2019

 

                    URINALYSIS NONAUTO W/O SCOPE CPT-4: 95633        2019

 

                    URINE CULTURE/ COLONY COUNT CPT-4: 94228        2019

 

                    URINE CULTURE/ COLONY COUNT CPT-4: 73106        10/02/2018

 

                    ROUTINE VENIPUNCTURE CPT-4: 74777        2018

 

                    ASSAY OF FREE THYROXINE CPT-4: 83717        2018

 

                    ASSAY THYROID STIM HORMONE CPT-4: 38438        2018

 

                    COMPLETE CBC W/AUTO DIFF WBC CPT-4: 32678        2018

 

                    METABOLIC PANEL TOTAL CA CPT-4: 48060        2018

 

                    FLU VACC PRSV FREE INC ANTIG 65 AND OLDER CPT-4: 45862      

  2018

 

                    ASSAY, GLUCOSE, BLOOD QUANT CPT-4: 62925        2018

 

                    ADMIN INFLUENZA VIRUS VAC CPT-4:         2018

 

                    ROUTINE VENIPUNCTURE CPT-4: 68868        2018

 

                    COMPREHEN METABOLIC PANEL CPT-4: 46617        2018

 

                    COMPLETE CBC W/AUTO DIFF WBC CPT-4: 58577        2018

 

                    A1C HPLC            CPT-4: 03050        2018

 

                    ASSAY OF TROPONIN QUANT CPT-4: 42050        2018

 

                    LIPID PANEL         CPT-4: 55839        2018

 

                    THER/PROPH/DIAG INJ SC/IM CPT-4: 86218        2018

 

                    TRIAMCINOLONE ACET INJ NOS CPT-4:         2018

 

                    URINALYSIS NONAUTO W/O SCOPE CPT-4: 77351        2018

 

                    URINE CULTURE/ COLONY COUNT CPT-4: 18789        2018

 

                    FLU VACC PRSV FREE INC ANTIG 65 AND OLDER CPT-4: 15398      

  10/06/2017

 

                    ADMIN INFLUENZA VIRUS VAC CPT-4:         10/06/2017

 

                    ROUTINE VENIPUNCTURE CPT-4: 10446        2017

 

                    COMPREHEN METABOLIC PANEL CPT-4: 00203        2017

 

                    COMPLETE CBC W/AUTO DIFF WBC CPT-4: 17714        2017

 

                    LIPID PANEL         CPT-4: 73801        2017

 

                    A1C HPLC            CPT-4: 60740        2017

 

                    ASSAY THYROID STIM HORMONE CPT-4: 97661        2017

 

                    ROUTINE VENIPUNCTURE CPT-4: 17624        2017

 

                    ASSAY THYROID STIM HORMONE CPT-4: 96011        2017

 

                    COMPREHEN METABOLIC PANEL CPT-4: 78723        2017

 

                    COMPLETE CBC W/AUTO DIFF WBC CPT-4: 47053        2017

 

                    A1C HPLC            CPT-4: 59327        2017

 

                    FLU VACC PRSV FREE INC ANTIG 65 AND OLDER CPT-4: 02405      

  10/07/2016

 

                    ADMIN INFLUENZA VIRUS VAC CPT-4:         10/07/2016

 

                    ROUTINE VENIPUNCTURE CPT-4: 45552        2016

 

                    ASSAY OF FREE THYROXINE CPT-4: 61665        2016

 

                    ASSAY THYROID STIM HORMONE CPT-4: 62458        2016

 

                    COMPREHEN METABOLIC PANEL CPT-4: 21389        2016

 

                    COMPLETE CBC W/AUTO DIFF WBC CPT-4: 04097        2016

 

                    LIPID PANEL         CPT-4: 08894        2016

 

                    A1C HPLC            CPT-4: 03627        2016

 

                    URINALYSIS NONAUTO W/O SCOPE CPT-4: 35084        2016

 

                    ROUTINE VENIPUNCTURE CPT-4: 56154        2015

 

                    METABOLIC PANEL TOTAL CA CPT-4: 06536        2015

 

                    PRESCRIP TRANSMIT VIA ERX SY CPT-4:         2015

 

                    FLU VACC PRSV FREE INC ANTIG 65 AND OLDER CPT-4: 97173      

  10/16/2015

 

                    ADMIN INFLUENZA VIRUS VAC CPT-4:         10/16/2015

 

                    URINALYSIS NONAUTO W/O SCOPE CPT-4: 57527        09/15/2015

 

                    URINE CULTURE/ COLONY COUNT CPT-4: 54226        09/15/2015

 

                    ROUTINE VENIPUNCTURE CPT-4: 10635        09/10/2015

 

                    ASSAY OF FREE THYROXINE CPT-4: 42367        09/10/2015

 

                    ASSAY THYROID STIM HORMONE CPT-4: 75795        09/10/2015

 

                    COMPREHEN METABOLIC PANEL CPT-4: 33924        09/10/2015

 

                    COMPLETE CBC W/AUTO DIFF WBC CPT-4: 34637        09/10/2015

 

                    LIPID PANEL         CPT-4: 39097        09/10/2015

 

                    A1C HPLC            CPT-4: 00032        09/10/2015

 

                    CERUM REMOVAL       CPT-4: 88627        2015

 

                    PRESCRIP TRANSMIT VIA ERX SY CPT-4:         2015

 

                    FLUZONE, 5ML (Medicare) CPT-4:         10/17/2014

 

                    ADMIN INFLUENZA VIRUS VAC CPT-4:         10/17/2014

 

                    PRESCRIP TRANSMIT VIA ERX SY CPT-4:         2014

 

                    PRESCRIP TRANSMIT VIA ERX SY CPT-4:         2014

 

                    PRESCRIP TRANSMIT VIA ERX SY CPT-4:         2014

 

                    ROUTINE VENIPUNCTURE CPT-4: 01824        2014

 

                    ASSAY OF FREE THYROXINE CPT-4: 10016        2014

 

                    ASSAY THYROID STIM HORMONE CPT-4: 07877        2014

 

                    COMPREHEN METABOLIC PANEL CPT-4: 70374        2014

 

                    COMPLETE CBC W/AUTO DIFF WBC CPT-4: 25955        2014

 

                    LIPID PANEL         CPT-4: 26974        2014

 

                    A1C HPLC            CPT-4: 93733        2014

 

                    VITAMIN B 12 FOLIC ACID CPT-4: 86197|35519  2014

 

                    PRESCRIP TRANSMIT VIA ERX SY CPT-4:         2014

 

                    PRESCRIP TRANSMIT VIA ERX SY CPT-4:         10/15/2013

 

                    FLUZONE, 5ML (Medicare) CPT-4:         2013

 

                    ADMIN INFLUENZA VIRUS VAC CPT-4:         2013

 

                    PRESCRIP TRANSMIT VIA ERX SY CPT-4:         2013

 

                    PRESCRIP TRANSMIT VIA ERX SY CPT-4:         05/10/2013

 

                    ROUTINE VENIPUNCTURE CPT-4: 57358        2012

 

                    ASSAY OF FREE THYROXINE CPT-4: 03388        2012

 

                    ASSAY THYROID STIM HORMONE CPT-4: 91272        2012

 

                    COMPREHEN METABOLIC PANEL CPT-4: 45837        2012

 

                    COMPLETE CBC W/AUTO DIFF WBC CPT-4: 56327        2012

 

                    LIPID PANEL         CPT-4: 03815        2012

 

                    A1C GLYCOSYLATED HEMOGLOBIN TEST CPT-4: 27471        

012

 

                    CERUM REMOVAL       CPT-4: 33925        2012

 

                    PRESCRIP TRANSMIT VIA ERX SY CPT-4:         2012

 

                    PRESCRIP TRANSMIT VIA ERX SY CPT-4:         10/10/2012

 

                    FLUZONE, 5ML (Medicare) CPT-4:         2012

 

                    ADMIN INFLUENZA VIRUS VAC CPT-4:         2012

 

                    ASSAY, GLUCOSE, BLOOD QUANT CPT-4: 73677        2012

 

                    URINALYSIS NONAUTO W/O SCOPE CPT-4: 36357        2012

 

                    URINE CULTURE/ COLONY COUNT CPT-4: 44279        2012

 

                    ROUTINE VENIPUNCTURE CPT-4: 01086        2012

 

                    ASSAY OF FREE THYROXINE CPT-4: 37189        2012

 

                    ASSAY THYROID STIM HORMONE CPT-4: 45558        2012

 

                    COMPREHEN METABOLIC PANEL CPT-4: 88470        2012

 

                    COMPLETE CBC W/AUTO DIFF WBC CPT-4: 88617        2012

 

                    LIPID PANEL         CPT-4: 72688        2012

 

                    ASSAY OF INSULIN    CPT-4: 89067        2012

 

                    A1C GLYCOSYLATED HEMOGLOBIN TEST CPT-4: 60326        

012

 

                    DRAIN/INJECT JOINT/BURSA CPT-4: 04108        2012

 

                    METHYLPREDNISOLONE 40 MG INJ CPT-4:         2012

 

                    TRIAMCINOLONE ACET INJ NOS CPT-4:         2012

 

                    PRESCRIP TRANSMIT VIA ERX SY CPT-4:         2012

 

                    PRESCRIP TRANSMIT VIA ERX SY CPT-4:         2012

 

                    METHYLPREDNISOLONE 40 MG INJ CPT-4:         2012

 

                    DRAIN/INJECT JOINT/BURSA CPT-4: 29414        2012

 

                    TRIAMCINOLONE ACET INJ NOS CPT-4:         2012

 

                    PRESCRIP TRANSMIT VIA ERX SY CPT-4:         2012

 

                    ROUTINE VENIPUNCTURE CPT-4: 61658        2012

 

                    ASSAY OF FREE THYROXINE CPT-4: 47756        2012

 

                    ASSAY THYROID STIM HORMONE CPT-4: 80084        2012

 

                    COMPREHEN METABOLIC PANEL CPT-4: 53867        2012

 

                    COMPLETE CBC W/AUTO DIFF WBC CPT-4: 65650        2012

 

                    LIPID PANEL         CPT-4: 39465        2012

 

                    PRESCRIP TRANSMIT VIA ERX SY CPT-4:         2012

 

                    CERUM REMOVAL       CPT-4: 42198        2011

 

                    PRESCRIP TRANSMIT VIA ERX SY CPT-4:         2011

 

                    FLUZONE, 5ML (Medicare) CPT-4:         10/05/2011

 

                    ADMIN INFLUENZA VIRUS VAC CPT-4:         10/05/2011

 

                    PRESCRIP TRANSMIT VIA ERX SY CPT-4:         2011

 

                    URINALYSIS NONAUTO W/O SCOPE CPT-4: 95434        2011

 

                    URINE CULTURE/ COLONY COUNT CPT-4: 54702        2011

 

                    CUR TOBACCO NON-USER CPT-4:         2011

 

                    ROUTINE VENIPUNCTURE CPT-4: 48689        2011

 

                    COMPLETE CBC W/AUTO DIFF WBC CPT-4: 10899        2011

 

                    COMPREHEN METABOLIC PANEL CPT-4: 23056        2011

 

                    LIPID PANEL         CPT-4: 98280        2011

 

                    ASSAY THYROID STIM HORMONE CPT-4: 09035        2011

 

                    ASSAY OF FREE THYROXINE CPT-4: 29917        2011

 

                    PRESCRIP TRANSMIT VIA ERX SY CPT-4:         2011

 

                    INJ TRIGGER POINT 1/2 MUSCL CPT-4: 22534        2011

 

                    TRIAMCINOLONE ACET INJ NOS CPT-4:         2011

 

                    METHYLPREDNISOLONE 40 MG INJ CPT-4:         2011

 

                    THER/PROPH/DIAG INJ SC/IM CPT-4: 74340        2011

 

                    KETOROLAC TROMETHAMINE INJ CPT-4:         2011

 

                    PRESCRIP TRANSMIT VIA ERX SY CPT-4:         2010

 

                    FLU VACCINE 3 YRS & > IM UP 64 CPT-4: 15134        10/06/201

0

 

                    ADMIN INFLUENZA VIRUS VAC CPT-4:         10/06/2010

 

                    URINALYSIS NONAUTO W/O SCOPE CPT-4: 57234        2010

 

                    URINE CULTURE/ COLONY COUNT CPT-4: 31776        2010

 

                    PRESCRIP TRANSMIT VIA ERX SY CPT-4:         2010

 

                    THER/PROPH/DIAG INJ SC/IM CPT-4: 81170        2010

 

                    VITAMIN B12 INJECTION CPT-4:         2010

 

                    THER/PROPH/DIAG INJ SC/IM CPT-4: 42638        2010

 

                    VITAMIN B12 INJECTION CPT-4:         2010

 

                    ROUTINE VENIPUNCTURE CPT-4: 15890        2010







Vital Signs





                          Date                      Vital

 

                    2020          Blood Pressure 1: 144/82 Code: 8480-6 

art Rate 1: 56 bpm

 

             2019   Blood Pressure 1: 140/70 Code: 8480-6 Heart Rate 1: 57

 bpm SpO2: 99%    

Temperature: 35.9 (C) / 96.6 (F)        Weight: 215 lbs 

 

                06/10/2019      Blood Pressure 1: 144/70 Code: 8480-6 Heart Rate

 1: 60 bpm 

Respiratory Rate: 20 bpm SpO2: 95%           Temperature: 36.4 (C) / 97.6 (F) We

ight: 214 

lbs 

 

                2019      Blood Pressure 1: 128/78 Code: 8480-6 Heart Rate

 1: 56 bpm 

Respiratory Rate: 20 bpm SpO2: 98%           Temperature: 36.3 (C) / 97.4 (F) We

ight: 213 

lbs 

 

                2018      Blood Pressure 1: 142/60 Code: 8480-6 BMI: 38.2 

Code: 93042-7 Heart 

Rate 1: 48 bpm  Height: 5'2"    Respiratory Rate: 20 bpm SpO2: 98%       Tempera

ture: 36.7

(C) / 98.1 (F)                          Weight: 212 lbs 

 

                2018      Blood Pressure 1: 142/64 Code: 8480-6 BMI: 38.5 

Code: 23035-9 Heart 

Rate 1: 52 bpm  Height: 5'2"    Respiratory Rate: 22 bpm SpO2: 96%       Tempera

ture: 36.1

(C) / 96.9 (F)                          Weight: 214 lbs 

 

                10/02/2018      Blood Pressure 1: 124/80 Code: 8480-6 BMI: 38.3 

Code: 08175-3 Heart 

Rate 1: 68 bpm      Height: 5'2"        Respiratory Rate: 20 bpm Temperature: 36

.3 (C) / 

97.4 (F)                                Weight: 213 lbs 

 

                2018      Blood Pressure 1: 132/78 Code: 8480-6 BMI: 37.6 

Code: 60411-2 Heart 

Rate 1: 68 bpm  Height: 5'2"    Respiratory Rate: 20 bpm SpO2: 97%       Tempera

ture: 36.8

(C) / 98.2 (F)                          Weight: 209 lbs 

 

                2018      Blood Pressure 1: 150/76 Code: 8480-6 BMI: 38.5 

Code: 54520-7 Heart 

Rate 1: 64 bpm  Height: 5'2"    Respiratory Rate: 20 bpm SpO2: 97%       Tempera

ture: 36.2

(C) / 97.2 (F)                          Weight: 214 lbs 

 

                2018      Blood Pressure 1: 122/74 Code: 8480-6 BMI: 38.2 

Code: 95463-8 Heart 

Rate 1: 64 bpm  Height: 5'2"    Respiratory Rate: 18 bpm SpO2: 96%       Tempera

ture: 35.8

(C) / 96.4 (F)                          Weight: 212 lbs 

 

                2018      Blood Pressure 1: 124/78 Code: 8480-6 BMI: 37.8 

Code: 80609-6 Heart 

Rate 1: 76 bpm      Height: 5'2"        Respiratory Rate: 20 bpm Temperature: 36

.8 (C) / 

98.3 (F)                                Weight: 210 lbs 

 

                2018      Blood Pressure 1: 136/70 Code: 8480-6 BMI: 38.0 

Code: 20616-8 Heart 

Rate 1: 68 bpm  Height: 5'2"    Respiratory Rate: 20 bpm SpO2: 97%       Tempera

ture: 36.8

(C) / 98.2 (F)                          Weight: 211 lbs 

 

                2018      Blood Pressure 1: 140/65 Code: 8480-6 Heart Rate

 1: 75 bpm 

Respiratory Rate: 24 bpm SpO2: 95%           Temperature: 37.0 (C) / 98.6 (F) We

ight: 211 

lbs 

 

                2018      Blood Pressure 1: 154/70 Code: 8480-6 BMI: 37.6 

Code: 58620-6 Heart 

Rate 1: 76 bpm  Height: 5'2"    Respiratory Rate: 20 bpm SpO2: 98%       Tempera

ture: 36.9

(C) / 98.5 (F)                          Weight: 209 lbs 

 

                2017      Blood Pressure 1: 156/70 Code: 8480-6 BMI: 37.1 

Code: 28479-9 Heart 

Rate 1: 72 bpm  Height: 5'2"    Respiratory Rate: 20 bpm SpO2: 97%       Tempera

ture: 37.0

(C) / 98.6 (F)                          Weight: 206 lbs 

 

                2017      Blood Pressure 1: 152/78 Code: 8480-6 BMI: 36.8 

Code: 91640-9 Heart 

Rate 1: 78 bpm  Height: 5'2"    Respiratory Rate: 20 bpm SpO2: 98%       Tempera

ture: 36.1

(C) / 97.0 (F)                          Weight: 204 lbs 

 

                2017      Blood Pressure 1: 142/70 Code: 8480-6 BMI: 36.9 

Code: 14406-9 Heart 

Rate 1: 64 bpm  Height: 5'2"    Respiratory Rate: 20 bpm SpO2: 96%       Tempera

ture: 36.5

(C) / 97.7 (F)                          Weight: 205 lbs 

 

                2017      Blood Pressure 1: 142/68 Code: 8480-6 Heart Rate

 1: 88 bpm 

Respiratory Rate: 18 bpm SpO2: 98%           Temperature: 36.3 (C) / 97.3 (F) We

ight: 209 

lbs 

 

                2016      Blood Pressure 1: 130/78 Code: 8480-6 Heart Rate

 1: 68 bpm 

Respiratory Rate: 20 bpm SpO2: 95%           Temperature: 36.4 (C) / 97.6 (F) We

ight: 212 

lbs 

 

                2016      Blood Pressure 1: 122/64 Code: 8480-6 BMI: 39.1 

Code: 00066-0 Heart 

Rate 1: 76 bpm      Height: 5'2"        Respiratory Rate: 20 bpm Temperature: 36

.8 (C) / 

98.2 (F)                                Weight: 217 lbs 

 

                2016      Blood Pressure 1: 144/70 Code: 8480-6 BMI: 39.4 

Code: 86013-6 Heart 

Rate 1: 76 bpm      Height: 5'2"        Respiratory Rate: 20 bpm Temperature: 36

.6 (C) / 

97.9 (F)                                Weight: 219 lbs 

 

                2015      Blood Pressure 1: 152/60 Code: 8480-6 BMI: 39.6 

Code: 18837-8 Heart 

Rate 1: 84 bpm      Height: 5'2"        Respiratory Rate: 20 bpm Temperature: 37

.0 (C) / 

98.6 (F)                                Weight: 220 lbs 

 

                2015      Blood Pressure 1: 146/76 Code: 8480-6 BMI: 39.8 

Code: 74355-3 Heart 

Rate 1: 88 bpm      Height: 5'2"        Respiratory Rate: 20 bpm Temperature: 37

.0 (C) / 

98.6 (F)                                Weight: 221 lbs 

 

                2015      Blood Pressure 1: 132/70 Code: 8480-6 BMI: 39.1 

Code: 65664-5 Heart 

Rate 1: 88 bpm      Height: 5'2"        Respiratory Rate: 20 bpm Temperature: 36

.4 (C) / 

97.6 (F)                                Weight: 217 lbs 

 

                2015      Blood Pressure 1: 132/66 Code: 8480-6 BMI: 39.9 

Code: 23019-9 Heart 

Rate 1: 72 bpm      Height: 5'2"        Respiratory Rate: 20 bpm Temperature: 36

.9 (C) / 

98.4 (F)                                Weight: 218 lbs 

 

                2015      Blood Pressure 1: 136/80 Code: 8480-6 Heart Rate

 1: 76 bpm 

Respiratory Rate: 20 bpm  Temperature: 36.7 (C) / 98.0 (F) Weight: 224 lbs 

 

                2015      Blood Pressure 1: 134/78 Code: 8480-6 BMI: 39.7 

Code: 03601-0 Heart 

Rate 1: 84 bpm      Height: 5'2"        Respiratory Rate: 20 bpm Temperature: 36

.7 (C) / 

98.0 (F)                                Weight: 217 lbs 

 

                2014      Blood Pressure 1: 146/78 Code: 8480-6 BMI: 39.5 

Code: 55342-9 Heart 

Rate 1: 82 bpm      Height: 5'2"        Respiratory Rate: 18 bpm Temperature: 35

.6 (C) / 

96.1 (F)                                Weight: 216 lbs 

 

                2014      Blood Pressure 1: 134/70 Code: 8480-6 BMI: 37.9 

Code: 76792-1 Heart 

Rate 1: 80 bpm      Height: 5'3"        Respiratory Rate: 20 bpm Temperature: 36

.8 (C) / 

98.2 (F)                                Weight: 214 lbs 

 

                2014      Blood Pressure 1: 132/70 Code: 8480-6 BMI: 37.6 

Code: 11168-9 Heart 

Rate 1: 80 bpm      Height: 5'3"        Respiratory Rate: 20 bpm Temperature: 36

.8 (C) / 

98.3 (F)                                Weight: 212 lbs 

 

                2014      Blood Pressure 1: 116/74 Code: 8480-6 Heart Rate

 1: 68 bpm 

Respiratory Rate: 20 bpm  Temperature: 36.2 (C) / 97.1 (F) Weight: 212 lbs 

 

                10/15/2013      Blood Pressure 1: 132/82 Code: 8480-6 BMI: 37.4 

Code: 36800-0 Heart 

Rate 1: 76 bpm      Height: 5'3"        Respiratory Rate: 20 bpm Temperature: 36

.7 (C) / 

98.0 (F)                                Weight: 211 lbs 

 

                    2013          Blood Pressure 1: 130/76 Code: 8480-6 He

art Rate 1: 78 bpm

 

                2013      Blood Pressure 1: 140/82 Code: 8480-6 BMI: 36.8 

Code: 37728-5 Heart 

Rate 1: 66 bpm      Height: 5'3"        Respiratory Rate: 20 bpm Temperature: 36

.1 (C) / 

96.9 (F)                                Weight: 208 lbs 

 

                2013      Blood Pressure 1: 138/80 Code: 8480-6 BMI: 36.4 

Code: 71812-8 Heart 

Rate 1: 72 bpm      Height: 5'4"        Respiratory Rate: 20 bpm Temperature: 36

.7 (C) / 

98.0 (F)                                Weight: 212 lbs 

 

                2013      Blood Pressure 1: 124/70 Code: 8480-6 BMI: 36.9 

Code: 67196-2 Heart 

Rate 1: 60 bpm      Height: 5'4"        Temperature: 36.1 (C) / 97.0 (F) Weight:

 215 lbs 

 

                05/10/2013      Blood Pressure 1: 132/86 Code: 8480-6 BMI: 36.9 

Code: 79220-9 Heart 

Rate 1: 76 bpm      Height: 5'4"        Respiratory Rate: 20 bpm Temperature: 36

.8 (C) / 

98.2 (F)                                Weight: 215 lbs 

 

                2013      Blood Pressure 1: 134/82 Code: 8480-6 BMI: 36.6 

Code: 47458-5 Heart 

Rate 1: 72 bpm      Height: 5'4"        Respiratory Rate: 20 bpm Temperature: 36

.3 (C) / 

97.4 (F)                                Weight: 213 lbs 

 

                2012      Blood Pressure 1: 142/80 Code: 8480-6 BMI: 37.1 

Code: 08754-5 Heart 

Rate 1: 76 bpm      Height: 5'4"        Respiratory Rate: 20 bpm Temperature: 36

.8 (C) / 

98.3 (F)                                Weight: 216 lbs 

 

                10/23/2012      Blood Pressure 1: 128/68 Code: 8480-6 BMI: 37.6 

Code: 47312-6 Heart 

Rate 1: 72 bpm      Height: 5'4"        Respiratory Rate: 20 bpm Temperature: 36

.6 (C) / 

97.9 (F)                                Weight: 219 lbs 

 

                10/10/2012      Blood Pressure 1: 122/70 Code: 8480-6 Heart Rate

 1: 76 bpm 

Respiratory Rate: 20 bpm  Temperature: 36.7 (C) / 98.1 (F) Weight: 218 lbs 

 

                    2012          Blood Pressure 1: 132/80 Code: 8480-6 He

art Rate 1: 84 bpm

 

                2012      Blood Pressure 1: 128/78 Code: 8480-6 BMI: 38.1 

Code: 80530-4 Heart 

Rate 1: 84 bpm      Height: 5'4"        Respiratory Rate: 20 bpm Temperature: 36

.9 (C) / 

98.4 (F)                                Weight: 222 lbs 

 

                2012      Blood Pressure 1: 138/80 Code: 8480-6 BMI: 37.9 

Code: 19322-3 Heart 

Rate 1: 74 bpm      Height: 5'4"        Temperature: 36.1 (C) / 97.0 (F) Weight:

 221 lbs 

 

                2012      Blood Pressure 1: 126/78 Code: 8480-6 BMI: 38.4 

Code: 83279-1 Heart 

Rate 1: 72 bpm      Height: 5'4"        Respiratory Rate: 20 bpm Temperature: 36

.7 (C) / 

98.0 (F)                                Weight: 224 lbs 

 

                2012      Blood Pressure 1: 138/72 Code: 8480-6 BMI: 38.4 

Code: 88324-8 Heart 

Rate 1: 72 bpm      Height: 5'4"        Respiratory Rate: 20 bpm Temperature: 36

.6 (C) / 

97.9 (F)                                Weight: 224 lbs 

 

                2012      Blood Pressure 1: 122/78 Code: 8480-6 BMI: 38.8 

Code: 07679-3 Heart 

Rate 1: 88 bpm      Height: 5'4"        Respiratory Rate: 20 bpm Temperature: 36

.6 (C) / 

97.8 (F)                                Weight: 226 lbs 

 

                2012      Blood Pressure 1: 116/60 Code: 8480-6 BMI: 38.1 

Code: 93686-8 Heart 

Rate 1: 92 bpm      Height: 5'4"        Respiratory Rate: 20 bpm Temperature: 36

.8 (C) / 

98.2 (F)                                Weight: 222 lbs 

 

                2011      Blood Pressure 1: 118/62 Code: 8480-6 BMI: 37.9 

Code: 07428-9 Heart 

Rate 1: 80 bpm      Height: 5'4"        Temperature: 36.5 (C) / 97.7 (F) Weight:

 221 lbs 

 

                2011      Blood Pressure 1: 132/70 Code: 8480-6 Heart Rate

 1: 84 bpm 

Respiratory Rate: 20 bpm  Temperature: 36.7 (C) / 98.0 (F) Weight: 221 lbs 

 

                2011      Blood Pressure 1: 114/72 Code: 8480-6 BMI: 37.6 

Code: 85638-2 Heart 

Rate 1: 76 bpm      Height: 5'4"        Respiratory Rate: 20 bpm Temperature: 36

.8 (C) / 

98.3 (F)                                Weight: 219 lbs 

 

                    2011          Blood Pressure 1: 136/76 Code: 8480-6 Te

mperature: 36.0 (C) / 96.8 

(F)                                     Weight: 219 lbs 8 oz

 

                2011      Blood Pressure 1: 128/80 Code: 8480-6 Heart Rate

 1: 72 bpm 

Temperature: 36.3 (C) / 97.4 (F)        Weight: 218 lbs 

 

                2011      Blood Pressure 1: 126/70 Code: 8480-6 Heart Rate

 1: 72 bpm 

Temperature: 36.7 (C) / 98.0 (F)

 

                    2010          Blood Pressure 1: 126/78 Code: 8480-6 Te

mperature: 36.2 (C) / 97.1 

(F)                                     Weight: 217 lbs 8 oz

 

                2010      Blood Pressure 1: 116/70 Code: 8480-6 Heart Rate

 1: 72 bpm 

Temperature: 36.4 (C) / 97.6 (F)        Weight: 222 lbs 

 

                2010      Blood Pressure 1: 114/78 Code: 8480-6 Heart Rate

 1: 72 bpm 

Temperature: 37.1 (C) / 98.8 (F)        Weight: 225 lbs 

 

                2010      Blood Pressure 1: 128/78 Code: 8480-6 Heart Rate

 1: 76 bpm 

Temperature: 36.6 (C) / 97.8 (F)        Weight: 224 lbs 

 

                2010      Blood Pressure 1: 116/78 Code: 8480-6 Heart Rate

 1: 80 bpm 

Temperature: 36.4 (C) / 97.6 (F)        Weight: 226 lbs 

 

                2010      Blood Pressure 1: 120/70 Code: 8480-6 BMI: 38.3 

Code: 34663-8 Heart 

Rate 1: 88 bpm      Height: 5'5"        Temperature: 36.4 (C) / 97.6 (F) Weight:

 230 lbs 







Functional Status

No Functional Status data



Reason For Visit





                    Reason For Visit    Effective Dates     Notes

 

                    blood pressure check 2020           

 

                    lab draw            2019           

 

                    lab draw            10/11/2019           

 

                    hearing loss        2019          left ear

 

                    follow up           06/10/2019           

 

                    follow up           2019          Patient has upcoming

 appointment with Dr Claire and carotid 

dopplers tomorrow

 

                    follow up           2018          Patient had heart ca

th on 10-30-18 and one of the bypass 

veins in spasming

 

                    follow up           2018          4 Week

 

                    follow up           10/02/2018           

 

                    follow up           2018           

 

                    high blood pressure 2018          Dr Claire has ordered

 holter monitor and 

hydralazine 50mg PRN

 

                    dizziness           2018          On  morning pa

tient woke up and went to the bathroom 

and after lying down became very dizzy. Patient has hx of vertigo so didn't 
think anything of it. She usually just has them intermittently, but had more 
that day. While at [employer] began to feel very ill and became very shaky. She got 
home and sat in the recliner and had the jittery/nervous feeling. Later that 
night it finally resolved. Patient feels like she had a spell like this around 
the  and thought it was due to extreme heat exhaustion.

 

                    follow up           2018           

 

                    back pain           2018           

 

                    otalgia             2018           

 

                    back pain           2018           

 

                    injection(s)        10/06/2017          flu shot

 

                    lab draw            2017           

 

                    follow up           2017           

 

                    pelvic pain         2017          Patient states pain 

started in May with a "Constant Ache"

to left inguinal area. Patient states at that time pain was intermittent. Then, 
approximately 2nd week  of  pain worsened and radiates to left low back 
area.

 

                    lab draw            2017           

 

                    hyperglycemia       2017           

 

                    cough               2017           

 

                    otalgia             2016           

 

                    injection(s)        10/07/2016          Influenza

 

                    lab draw            2016           

 

                    constipation        2016           

 

                    flank pain          2016           

 

                    follow up           2015          3mo fwup

 

                    injection(s)        10/16/2015          Influenza

 

                    acute renal failure 09/15/2015           

 

                    lab draw            09/10/2015           

 

                    fatigue             2015           

 

                    otalgia             2015           

 

                    pain, limb          2015           

 

                    follow up           2015          [facility] fwup

 

                    high blood pressure 2015           

 

                    injection(s)        10/17/2014          flu shot

 

                    sinusitis           2014           

 

                    high blood pressure 2014           

 

                    cough               2014          Was panfilo at Dr Tyree teixeira's office so he started he on amlodipine 

10mg but seems to be dragging her down. Patient decreased to 1/2 tablet this 
last week

 

                    lab draw            2014           

 

                    cough               2014           

 

                    cough               10/15/2013           

 

                    high blood pressure 2013           

 

                    follow up           2013          ER

 

                    dizziness           2013           

 

                    follow up           2013           

 

                    otalgia             05/10/2013           

 

                    breast complaint    2013           

 

                    lab draw            2012           

 

                    follow up           2012          2mo fwup

 

                    follow up           10/23/2012          2wk fwup

 

                    gas and bloating    10/10/2012          Dr Claire has her mon

itoring because was high in his 

office at last visit

 

                    injection(s)        2012           

 

                    dizziness           2012           

 

                    dizziness           2012           

 

                    lab draw            2012           

 

                    muscle weakness     2012          concerns of simvasta

tin with fatigue and muscle 

weakness

 

                    leg pain/sciatica   2012           

 

                    hip pain            2012           

 

                    back pain           2012          has tried ice pack, 

muscle relaxers, and moist heat

 

                    back pain           2012           

 

                    fatigue             2012          in hands/feet

 

                    otalgia             2011           

 

                    follow up           2011          2wk fwup

 

                    back pain           2011          thinks ulcer may hav

e returned

 

                    lab draw            2011           

 

                    dizziness           2011          Left ear and facial 

pain, right ear pain 

 

                    follow up           2011          1wk fwup

 

                    hip pain            2011          feels very draggy, f

atigue, BP 80/63, normally running 

100's/60's

 

                    chills              2010           

 

                    injection(s)        10/06/2010          flu shot

 

                    follow up           2010          6wk fwup, had HH rep

aired and is starting to feel better, 

started on reglan 10mg tid

 

                    follow up           2010          hosp fwup

 

                    follow up           2010          1mo fwup, saw Dr Danna du and hasn't gave cardiac clearance 

yet for HH repair, did have chemical stress test yesterday and waiting on 
results

 

                    follow up           2010          from EGD/colonoscopy

--has Hiatal Hernia and wants to do 

repair

 

                    follow up           2010           







Encounters





             Encounter    Performer    Location     Codes        Date

 

                                        () NURSE/OUTPATIENT VISIT EST

Diagnosis: Mixed hyperlipidemia[ICD10: E78.2] Akanksha SPENCERER DO [employer]            CPT-4: 97151              2019

 

                                        ([id number]) NURSE/OUTPATIENT VISIT EST

Diagnosis: FLU VACCINE[ICD10: Z23] Akanksha BAUMAN SAramis OTOOLEND

ER DO 

[employer]                       CPT-4: 24296              10/22/2019

 

                                        (80555) NURSE/OUTPATIENT VISIT EST

Diagnosis: Chronic kidney disease, stage 1[ICD10: N18.1] Akanksha SPENCERER DO [employer] CPT-4: 53310              10/11/2019

 

                                        (30535) OFFICE/OUTPATIENT VISIT EST

Diagnosis: Acute serous otitis media, left ear[ICD10: H65.02] Nat DRIVER [employer] [facility] CPT-4: 96150              2019

 

                                        (56328) OFFICE/OUTPATIENT VISIT EST

Diagnosis: Essential (primary) hypertension[ICD10: I10]

Diagnosis: Coronary atherosclerosis due to calcified coronary lesion[ICD10: 
I25.84]

Diagnosis: Hypoglycemia, unspecified[ICD10: E16.2]

Diagnosis: Other fatigue[ICD10: R53.83]

Diagnosis: Urinary tract infection, site not specified[ICD10: N39.0] Akanksha DRIVER [employer] [facility] CPT-4: 97675        06/10/2019

 

                                        ([id number]) OFFICE/OUTPATIENT VISIT EST

Diagnosis: Essential (primary) hypertension[ICD10: I10]

Diagnosis: Gastro-esophageal reflux disease without esophagitis[ICD10: K21.9]

Diagnosis: Urinary tract infection, site not specified[ICD10: N39.0] Akanksha DRIVER [employer] [facility] CPT-4: 03760        2019

 

                                        (30792) OFFICE/OUTPATIENT VISIT EST

Diagnosis: Gastro-esophageal reflux disease without esophagitis[ICD10: K21.9]

Diagnosis: Epigastric pain[ICD10: R10.13] Akanksha DRIVER [employer]            CPT-4: 09478              2018

 

                                        (93005) OFFICE/OUTPATIENT VISIT EST

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

Diagnosis: Essential (primary) hypertension[ICD10: I10]

Diagnosis: Generalized anxiety disorder[ICD10: F41.1]

Diagnosis: Gastro-esophageal reflux disease without esophagitis[ICD10: K21.9] 

Akanksha DRIVER [employer] [facility] CPT-4: 98472        2018

 

                                        OFFICE/OUTPATIENT VISIT EST

Diagnosis: Gastro-esophageal reflux disease without esophagitis[ICD10: K21.9]

Diagnosis: Palpitations[ICD10: R00.2]

Diagnosis: Urinary tract infection, site not specified[ICD10: N39.0]

Diagnosis: Generalized anxiety disorder[ICD10: F41.1] Akanksha DRIVER [employer] [facility] CPT-4: 29774              10/02/2018

 

                                        (70923) NURSE/OUTPATIENT VISIT EST

Diagnosis: Coronary atherosclerosis due to calcified coronary lesion[ICD10: 
I25.84]

Diagnosis: Hypoglycemia, unspecified[ICD10: E16.2]

Diagnosis: Dizziness and giddiness[ICD10: R42]

Diagnosis: Occlusion and stenosis of bilateral carotid arteries[ICD10: I65.23] 

Akanksha DRIVER [employer] [facility] CPT-4: 10084        2018

 

                                        OFFICE/OUTPATIENT VISIT EST

Diagnosis: Epigastric pain[ICD10: R10.13]

Diagnosis: Generalized anxiety disorder[ICD10: F41.1]

Diagnosis: FLU VACCINE[ICD10: Z23] Akanksha KEY [employer] 

[facility]                       CPT-4: 97943              2018

 

                                        (22273) OFFICE/OUTPATIENT VISIT EST

Diagnosis: Essential (primary) hypertension[ICD10: I10]

Diagnosis: Hypoglycemia, unspecified[ICD10: E16.2]

Diagnosis: Gastro-esophageal reflux disease without esophagitis[ICD10: K21.9] 

Akanksha DRIVER [facility] CPT-4: 80446        2018

 

                                        ([id number]) OFFICE/OUTPATIENT VISIT EST

Diagnosis: Dizziness and giddiness[ICD10: R42] Nat DRIVER [employer] [facility]            CPT-4: 35683              2018

 

                                        (11079) OFFICE/OUTPATIENT VISIT EST

Diagnosis: Cervicalgia[ICD10: M54.2]

Diagnosis: Other spondylosis with radiculopathy, cervical region[ICD10: M47.22] 

Akanksha DRIVER [employer] [facility] CPT-4: 16936        2018

 

                                        (58636) OFFICE/OUTPATIENT VISIT EST

Diagnosis: Hypoglycemia, unspecified[ICD10: E16.2]

Diagnosis: Other spondylosis with radiculopathy, cervical region[ICD10: M47.22]

Diagnosis: Vertigo of central origin, bilateral[ICD10: H81.43]

Diagnosis: Cervicocranial syndrome[ICD10: M53.0] Akanksha KEVINANN MARIE CHEATHAMAramis VILLA [facility]         CPT-4: 27066              2018

 

                                        (07157) OFFICE/OUTPATIENT VISIT EST

Diagnosis: Benign paroxysmal vertigo, bilateral[ICD10: H81.13]

Diagnosis: Otalgia, bilateral[ICD10: H92.03] Nat Lozoya          NYARAMOS CHEATHAMAramis 

VILL[facility]            CPT-4: 14838              2018

 

                                        (77642) OFFICE/OUTPATIENT VISIT EST

Diagnosis: Low back pain[ICD10: M54.5]

Diagnosis: Radiculopathy, lumbosacral region[ICD10: M54.17]

Diagnosis: Left lower quadrant pain[ICD10: R10.32] Akanksha DE LA ROSA

[facility]         CPT-[facility]: 11473              2018

 

                                        ([id number]) OFFICE/OUTPATIENT VISIT EST

Diagnosis: FLU VACCINE[ICD10: Z23] Akanksha BAUMAN SAramis TJ

[facility]                       CPT-4: 38778              10/06/2017

 

                                        (60976) OFFICE/OUTPATIENT VISIT EST

Diagnosis: Mixed hyperlipidemia[ICD10: E78.2]

Diagnosis: Essential (primary) hypertension[ICD10: I10]

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

Diagnosis: Impaired fasting glucose[ICD10: R73.01]

Diagnosis: Other fatigue[ICD10: R53.83] Akanksha BAUMAN SAramis 

[facility]ILL[facility]

[facility]                    CPT-4: 83690              2017

 

                                        ([id number]) OFFICE/OUTPATIENT VISIT EST

Diagnosis: Pain in thoracic spine[ICD10: M54.6]

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 

[facility] CPT-[facility]: 05897        2017

 

                                        ([id number]) OFFICE/OUTPATIENT VISIT EST

Diagnosis: Left lower quadrant pain[ICD10: R10.32]

Diagnosis: Low back pain[ICD10: M54.5] Akanksha SYKES 

[facility]                    CPT-4: 48973              2017

 

                                        (56931) OFFICE/OUTPATIENT VISIT EST

Diagnosis: Mixed hyperlipidemia[ICD10: E78.2]

Diagnosis: Essential (primary) hypertension[ICD10: I10]

Diagnosis: Hypoglycemia, unspecified[ICD10: E16.2] Akanksha Villa DRIVER [facility]         CPT-4: 95368              2017

 

                                        ([id number]) OFFICE/OUTPATIENT VISIT EST

Diagnosis: Impaired fasting glucose[ICD10: R73.01]

Diagnosis: Mastodynia[ICD10: N64.4]

Diagnosis: Mixed hyperlipidemia[ICD10: E78.2]

Diagnosis: Essential (primary) hypertension[ICD10: I10]

Diagnosis: Other fatigue[ICD10: R53.83] Akanksharj Driver        AKANKSHA SAramis 

VILLA

[facility]                    CPT-4: 94736              2017

 

                                        ([id number]) OFFICE/OUTPATIENT VISIT EST

Diagnosis: Acute upper respiratory infection, unspecified[ICD10: J06.9] Luba KEVINQUELINE SAramis VILLA [facility] CPT-4: 99475        2017

 

                                        (13087) OFFICE/OUTPATIENT VISIT EST

Diagnosis: Acute mastoiditis without complications, right ear[ICD10: H70.001] 

Akanksha Xiangndangela HIGHTOWERLINE SAramis VILLA [facility] CPT-4: 30903        2016

 

                                        (48339) OFFICE/OUTPATIENT VISIT EST

Diagnosis: FLU VACCINE[ICD10: Z23] Akanksha Xiangndangela HIGHTOWERLINE OLIVIA KEY [facility]                       CPT-4: 25395              10/07/2016

 

                                        (20682) OFFICE/OUTPATIENT VISIT EST

Diagnosis: Mixed hyperlipidemia[ICD10: E78.2]

Diagnosis: Essential (primary) hypertension[ICD10: I10]

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

Diagnosis: Impaired fasting glucose[ICD10: R73.01] Akanksha KEVIN

FELECIATAMMY

SAramis VILLA [facility]         CPT-4: 68540              2016

 

                                        (39906) OFFICE/OUTPATIENT VISIT EST

Diagnosis: Constipation, unspecified[ICD10: K59.00] Akanksha BAUMAN SAramis VILLA [facility] CPT-4: 03972              2016

 

                                        (11151) OFFICE/OUTPATIENT VISIT EST

Diagnosis: Essential (primary) hypertension[ICD10: I10]

Diagnosis: Mixed hyperlipidemia[ICD10: E78.2]

Diagnosis: Chronic kidney disease, stage 1[ICD10: N18.1] Akanksha DRIVER [employer] CPT-4: 63594              2016

 

                                        (45028) OFFICE/OUTPATIENT VISIT EST

Diagnosis: Muscle weakness (generalized)[ICD10: M62.81]

Diagnosis: Dizziness and giddiness[ICD10: R42]

Diagnosis: Essential (primary) hypertension[ICD10: I10]

Diagnosis: History of falling[ICD10: Z91.81] Akanksha DRIVER [facility]            CPT-4: 23734              2015

 

                                        (69210) OFFICE/OUTPATIENT VISIT EST

Diagnosis: FLU VACCINE[ICD10: Z23] Akanksha KEY [employer] 

[facility]                       CPT-4: 75126              10/16/2015

 

                                        (20832) OFFICE/OUTPATIENT VISIT EST

Diagnosis: Acute renal failure[ICD9: 584.9]

Diagnosis: POLYURIA[ICD9: 788.42] Akanksha LANCE [employer] 

[facility]                       CPT-4: 11678              09/15/2015

 

                                        (94075) OFFICE/OUTPATIENT VISIT EST

Diagnosis: HYPERLIPIDEMIA NEC/NOS[ICD9: 272.4]

Diagnosis: HYPERTENSION[ICD9: 401.9]

Diagnosis: CAD[ICD9: 414.00]

Diagnosis: Hyperglycemia[ICD9: 790.29]

Diagnosis: MALAISE AND FATIGUE[ICD9: 780.79] Akanksha DRIVER [facility]            CPT-4: 65971              09/10/2015

 

                                        (94047) OFFICE/OUTPATIENT VISIT EST

Diagnosis: MALAISE AND FATIGUE[ICD9: 780.79]

Diagnosis: HYPOGLYCEMIA[ICD9: 251.2]

Diagnosis: Grieving[ICD9: 309.0]

Diagnosis: ABDOMINAL PAIN[ICD9: 789.00] Akanksha DRIVER

[employer] [facility]                    CPT-4: 65679              2015

 

                                        OFFICE/OUTPATIENT VISIT EST

Diagnosis: CERUMEN IMPACTION[ICD9: 380.4]

Diagnosis: EUSTACHIAN TUBE DYSFUNCTION[ICD9: 381.81] Bertha DRIVER [facility] CPT-4: 52867              2015

 

                                        (89692) OFFICE/OUTPATIENT VISIT EST

Diagnosis: Leg pain[ICD9: 729.5]

Diagnosis: SUPERFIC PHLEBITIS-LEG[ICD9: 451.0] Akanksha DRIVER [facility]            CPT-4: 81168              2015

 

                                        (74802) OFFICE/OUTPATIENT VISIT EST

Diagnosis: Thoracic back pain[ICD9: 724.1]

Diagnosis: SPASM OF MUSCLE[ICD9: 728.85] Akanksha DRIVER [facility]            CPT-4: 39966              2015

 

                                        (37904) OFFICE/OUTPATIENT VISIT EST

Diagnosis: DIZZINESS/VERTIGO[ICD9: 780.4]

Diagnosis: Benign positional vertigo[ICD9: 386.11]

Diagnosis: HYPERTENSION[ICD9: 401.9]

Diagnosis: Suspicious nevus[ICD9: 238.2] Akanksha DRIVER [facility]            CPT-4: 58145              2015

 

                                        (68099) OFFICE/OUTPATIENT VISIT EST

Diagnosis: FLU VACCINE[ICD10: Z23] Akanksha SPENCER

[facility]                       CPT-4: 23237              10/17/2014

 

                                        OFFICE/OUTPATIENT VISIT EST

Diagnosis: SINUSITIS, ACUTE[ICD9: 461.9]

Diagnosis: EUSTACHIAN TUBE DYSFUNCTION[ICD9: 381.81] Bertha DRIVER [facility] CPT-4: 37455              2014

 

                                        (14648) OFFICE/OUTPATIENT VISIT EST

Diagnosis: DIZZINESS/VERTIGO[ICD9: 780.4]

Diagnosis: HYPERTENSION[ICD9: 401.9] Akanksha MEJIA [facility]                       CPT-4: 35149              2014

 

                                        (07292) OFFICE/OUTPATIENT VISIT EST

Diagnosis: HYPERTENSION[ICD9: 401.9]

Diagnosis: MALAISE AND FATIGUE[ICD9: 780.79]

Diagnosis: SINUSITIS, ACUTE[ICD9: 461.9] Akanksha DRIVER [facility]            CPT-4: 16636              2014

 

                                        (70061) OFFICE/OUTPATIENT VISIT EST

Diagnosis: HYPERLIPIDEMIA NEC/NOS[ICD9: 272.4]

Diagnosis: HYPERTENSION[ICD9: 401.9]

Diagnosis: B12 DEFIC ANEMIA NEC[ICD9: 281.1]

Diagnosis: HYPOGLYCEMIA[ICD9: 251.2] Akanksha Villa MEJIA [facility]                       CPT-4: 42655              2014

 

                                        OFFICE/OUTPATIENT VISIT EST

Diagnosis: COUGH[ICD9: 786.2] Bertha DRIVER [facility] 

CPT-4: 83875                            2014

 

                                        OFFICE/OUTPATIENT VISIT EST

Diagnosis: COUGH[ICD9: 786.2]

Diagnosis: URI, ACUTE[ICD9: 465.9] Bertha KEY [facility]                       CPT-4: 34048              10/15/2013

 

                                        (04661) OFFICE/OUTPATIENT VISIT EST

Diagnosis: HYPERTENSION[ICD9: 401.9]

Diagnosis: CEPHALGIA[ICD9: 784.0]

Diagnosis: ANXIETY STATE NOS[ICD9: 300.00]

Diagnosis: FLU VACCINE[ICD9: V04.81] [facility]                       [facility]: 47124              2013

 

                                        (11990) OFFICE/OUTPATIENT VISIT EST

Diagnosis: DIZZINESS/VERTIGO[ICD9: 780.4]

Diagnosis: SINUSITIS, ACUTE[ICD9: 461.9]

Diagnosis: Benign positional vertigo[ICD9: 386.11] Akankshatammy Driver        DORITA IZQUIERDOTAMMY

OLIVIA DRIVER [facility]         CPT-4: 68835              2013

 

                                        OFFICE/OUTPATIENT VISIT EST

Diagnosis: OTITIS MEDIA NOS[ICD9: 382.9] Akanksha Xiangndangela DRIVER [facility]            CPT-4: 83199              2013

 

                                        OFFICE/OUTPATIENT VISIT EST

Diagnosis: Perforation of ear drum[ICD9: 384.20]

Diagnosis: SINUSITIS, ACUTE[ICD9: 461.9] Akanksha Driver        [facility]            CPT[facility]: 10016              05/10/2013

 

                                        (56897) OFFICE/OUTPATIENT VISIT EST

Diagnosis: Mastalgia[ICD9: 611.71] Akanksha KEY [facility]                       CPT-4: 34131              2013

 

                                        (39906) OFFICE/OUTPATIENT VISIT EST

Diagnosis: HYPERLIPIDEMIA NEC/NOS[ICD9: 272.4]

Diagnosis: HYPERTENSION[ICD9: 401.9]

Diagnosis: DIZZINESS/VERTIGO[ICD9: 780.4]

Diagnosis: Hyperglycemia[ICD9: 790.29]

Diagnosis: MALAISE AND FATIGUE[ICD9: 780.79] Akanksha Otoolerodo        NYA

E SAramis 

VILLA [facility]            CPT-4: 10725              2012

 

                                        (07225) OFFICE/OUTPATIENT VISIT EST

Diagnosis: EUSTACHIAN TUBE DYSFUNCTION[ICD9: 381.81]

Diagnosis: DIZZINESS/VERTIGO[ICD9: 780.4]

Diagnosis: ABDOMINAL PAIN[ICD9: 789.00]

Diagnosis: GERD[ICD9: 530.81]

Diagnosis: CERUMEN IMPACTION[ICD9: 380.4] Akanksha Otoolendangela HIGHTOWERLINE S

Aramis 

VILLA [facility]            CPT-4: 70390              2012

 

                                        OFFICE/OUTPATIENT VISIT EST

Diagnosis: ABDOMINAL PAIN[ICD9: 789.00]

Diagnosis: DYSPEPSIA[ICD9: 536.8] Akanksha HIGHTOWERLINE SAramis GUERRERO

CASI [facility]                       CPT-4: 13533              10/23/2012

 

                                        (40753) OFFICE/OUTPATIENT VISIT EST

Diagnosis: ABDOMINAL PAIN[ICD9: 789.00]

Diagnosis: DYSPEPSIA[ICD9: 536.8]

Diagnosis: IBS[ICD9: 564.1] Akanksha Villa KEVINQUELINE SAramis VILLA [facility] CPT

-

4: 71066                                10/10/2012

 

                                        OFFICE/OUTPATIENT VISIT EST

Diagnosis: DIZZINESS/VERTIGO[ICD9: 780.4]

Diagnosis: HYPOGLYCEMIA[ICD9: 251.2] Akanksha Xiangrodo BAUMAN SAramis XIANG MEJIA [facility]                       CPT-4: 77959              2012

 

                                        (09145) OFFICE/OUTPATIENT VISIT EST

Diagnosis: URINARY TRACT INFECTION[ICD9: 599.0] Akanksha Orender        KATJA

LINE SAramis

VILLA [facility]            CPT-4: 35735              2012

 

                                        (80613) OFFICE/OUTPATIENT VISIT EST

Diagnosis: HYPERLIPIDEMIA NEC/NOS[ICD9: 272.4]

Diagnosis: HYPERTENSION[ICD9: 401.9]

Diagnosis: MALAISE AND FATIGUE[ICD9: 780.79]

Diagnosis: DIZZINESS/VERTIGO[ICD9: 780.4] Akanksha DRIVER [employer]            CPT-4: 98843              2012

 

                                        (13435) OFFICE/OUTPATIENT VISIT EST

Diagnosis: DIZZINESS/VERTIGO[ICD9: 780.4]

Diagnosis: MALAISE AND FATIGUE[ICD9: 780.79]

Diagnosis: HYPERTENSION[ICD9: 401.9] Akanksha MEJIA [employer]

[facility]                       CPT-4: 79560              2012

 

                                        OFFICE/OUTPATIENT VISIT EST

Diagnosis: LUMB/LUMBOSAC DISC DEGEN[ICD9: 722.52]

Diagnosis: Radiculopathy of leg[ICD9: 724.4]

Diagnosis: SACROILIITIS NEC[ICD9: 720.2]

Diagnosis: SPINAL ENTHESOPATHY[ICD9: 720.1] Akanksha DRIVER [facility]            CPT-4: 20802              2012

 

                                        (82286) OFFICE/OUTPATIENT VISIT EST

Diagnosis: PAIN, LOWER BACK[ICD9: 724.2]

Diagnosis: SPASM OF MUSCLE[ICD9: 728.85]

Diagnosis: SCIATICA[ICD9: 724.3]

Diagnosis: Lumbar degenerative disc disease[ICD9: 722.52] Akanksha DRIVER [employer] CPT-4: 73770              2012

 

                                        (20533) OFFICE/OUTPATIENT VISIT EST

Diagnosis: PAIN IN THORACIC SPINE[ICD9: 724.1]

Diagnosis: SPASM OF MUSCLE[ICD9: 728.85] Akanksha DRIVER [employer]            CPT-4: 09032              2012

 

                                        (77490) OFFICE/OUTPATIENT VISIT EST

Diagnosis: PAIN, LOWER BACK[ICD9: 724.2]

Diagnosis: SPASM OF MUSCLE[ICD9: 728.85]

Diagnosis: Sacroiliac dysfunction[ICD9: 739.4]

Diagnosis: Lumbar degenerative disc disease[ICD9: 722.52] Akanksha OTOOLENDER  [facility] CPT-4: 00207              2012

 

                                        OFFICE/OUTPATIENT VISIT EST

Diagnosis: MALAISE AND FATIGUE[ICD9: 780.79]

Diagnosis: HYPOTENSION[ICD9: 458.9]

Diagnosis: CAD[ICD9: 414.00] Akanksha SPENCERER  [facility] 

CPT-4: 90512                            2012

 

                                        OFFICE/OUTPATIENT VISIT EST

Diagnosis: SINUSITIS, ACUTE[ICD9: 461.9]

Diagnosis: PHARYNGITIS, ACUTE[ICD9: 462]

Diagnosis: CERUMEN IMPACTION[ICD9: 380.4] Akanksha DR[facility]            CPT-4: 18954              2011

 

                                        OFFICE/OUTPATIENT VISIT EST

Diagnosis: PAIN IN THORACIC SPINE[ICD9: 724.1]

Diagnosis: GERD[ICD9: 530.81]

Diagnosis: DIZZINESS/VERTIGO[ICD9: 780.4] Akanksha [facility]TOOLENDER  [facility]            CPT-4: 14766              2011

 

                OFFICE/OUTPATIENT VISIT EST Akanksha OTOOLE

NDER DO [facility] CPT-

4: 16259                                2011

 

                    (37434) OFFICE/OUTPATIENT VISIT EST Akanksha CASTILLO SAramis ORENDER DO 

[facility]                       CPT-4: 45720              2011

 

                                        (36180) OFFICE/OUTPATIENT VISIT, EST

Diagnosis: PAIN, LOWER BACK[ICD9: 724.2] Akanksha Villa BAUMAN SAramis

 

[facility]            CPT-4: 36719              2011

 

                    (25093) OFFICE/OUTPATIENT VISIT, EST Akanksha IZQUIERDOTAMMY SAramis ORENDER DO

[facility]                       CPT-4: 77995              2011

 

                    (03605) OFFICE/OUTPATIENT VISIT, EST Akanksha Driver  DORITA

RJ SAramis ORENDER DO

[facility]                       CPT-4: 77335              2010

 

                    (20980) OFFICE/OUTPATIENT VISIT, EST Akanksharj Otoolendangela  DORITA

FELECIATAMMY S. ORENDER DO

[facility]                       CPT-4: 23043              2010

 

                    (78934) OFFICE/OUTPATIENT VISIT, JED BAKER [facility]

[facility]                       CPT-4: 99433              2010

 

                    (06010) OFFICE/OUTPATIENT VISIT, EST Akanksha DE LA ROSA SAramis ORENDER DO

[facility]                       CPT-4: 04104              2010

 

                    (69353) OFFICE/OUTPATIENT VISIT, JED DE LA ROSA SAramis [facility]ENDER DO

[facility]                       CPT-4: 95695              2010

 

                    (65486) OFFICE/OUTPATIENT VISIT, JED BAKER ORENDER DO

[facility]                       CPT-4: 84343              2010







Plan of Care





             Planned Activity Notes        Codes        Status       Date

 

                                        Appointment: Akanksha Driver

WPtel:[phone]

                                        [address] [place] [address]                                              LAB             2019

 

                                        Appointment: Akanksha Driver

WPtel:[phone]

                                        [address] [place] [address]

[place]                                              INJECTION       10/22/2019

 

             Patient Education: INFLUENZA VACCINE CDC                           

Completed    10/22/2019

 

                                        Appointment: Akanksha Driver

WPtel:[phone]

                                        [address] [place] [address]                                              LAB             10/11/2019

 

                          Visit Diagnosis Plan: Acute serous otitis media, left 

ear Discussion: instructed

to use flonase and claritin daily for symptom relief. discussed with patient 
that if no improvement in 2 weeks, will need to follow up with ENT for audiology
exam. instructed to call office with any other symptoms such as otalgia, 
dysphagia, fever, etc.

                                        ICD-9 : 381.01

ICD-10 : H65.02

                                                    2019

 

                                        Appointment: Nat Lozoya

                                        [address] [place] [address]                                              ACUTE ILLNESS   2019

 

                          Visit Diagnosis Plan: Urinary tract infection, site no

t specified Discussion: 

Started an old abx prescription

                                        ICD-9 : 599.0

ICD-10 : N39.0

                                                    06/10/2019

 

                          Visit Diagnosis Plan: Hypoglycemia, unspecified Discus

madeleine: Monitor BS At least 

6 small meals a day each with protein

                                        ICD-9 : 251.2

ICD-10 : E16.2

                                                    06/10/2019

 

                          Visit Diagnosis Plan: Essential (primary) hypertension

 Discussion: Stable Check 

CMP

                                        ICD-9 : 401.9

ICD-10 : I10

                                                    06/10/2019

 

                          Visit Diagnosis Plan: Other fatigue Discussion: Check 

CBC, TSH

                                        ICD-9 : 780.79

ICD-10 : R53.83

                                                    06/10/2019

 

                                        Appointment: Akanksha Drivertel:[phone]

                                        [address] [place], [place] [id number]

[place]                                              FOLLOW UP       06/10/2019

 

                          Visit Diagnosis Plan: Essential (primary) hypertension

 Discussion: Stable Sees 

Dr. Clements this month

                                        ICD-9 : 401.9

ICD-10 : I10

                                                    2019

 

                          Visit Diagnosis Plan: Urinary tract infection, site no

t specified Discussion: 

Macrobid 100mg po BID for 1 week

                                        ICD-9 : 599.0

ICD-10 : N39.0

                                                    2019

 

                          Visit Diagnosis Plan: Gastro-esophageal reflux disease

 without esophagitis 

Discussion: Stable on omeprazole

Follow Up: 3 months

                                        ICD-9 : 530.81

ICD-10 : K21.9

                                                    2019

 

                                        Appointment: Akanksha Driver

WPtel:[phone]

                                        [address] [place], [place] [id number]

[place]                                              FOLLOW UP       2019

 

                          Patient Education: metoprolol tartrate- [employer] [id number] 

[email or web address]/[email or web address]

                                        Completed           2019

 

                                        Appointment: Akanksha Driver

WPtel:[phone]

                                        [address] [place], [place] [id number]

[place]                                              CANCELED        02/15/2019

 

                          Visit Diagnosis Plan: Gastro-esophageal reflux disease

 without esophagitis 

Discussion: Continue protonix and carafate Proceed with updated EGD

                                        ICD-9 : 530.81

ICD-10 : K21.9

                                                    2018

 

                          Visit Diagnosis Plan: Epigastric pain Discussion: [facility] CT abdomen/pelvis due to

ongoing abdominal pain

                                        ICD-9 : 789.06

ICD-10 : R10.13

                                                    2018

 

                                        Appointment: Akanksha Driver

WPtel:[phone] [address] [facility]

[place]                                              FOLLOW UP       2018

 

                    Care Plan: CT PELVIS W/O DYE                     LOINC : 361

08-9

                          Pending                   2018

 

                    Care Plan: CT ABDOMEN W/O DYE                     LOINC : 36

103-0

                          Pending                   2018

 

                    Care Plan: Referral Order                     SNOMED-CT : 30

[id number]

                          Pending                   2018

 

                                        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

art disease of native coronary artery 

without angina pectoris                 Discussion: S/P cardiac cath--doing medi

vicki management 

with imdur and metoprolol increased Has fwup with cardiology next week Discussed
cardiac rehab

                                        ICD-9 : 414.00

ICD-10 : I25.10

                                                    2018

 

                          Visit Diagnosis Plan: Generalized anxiety disorder Dis

cussion: Stable

                                        ICD-9 : 300.00

ICD-10 : F41.1

                                                    2018

 

                          Visit Diagnosis Plan: Gastro-esophageal reflux disease

 without esophagitis 

Discussion: Continue protonix at BID dosing and carafate BID

Follow Up: 2 months

                                        ICD-9 : 530.81

ICD-10 : K21.9

                                                    2018

 

                          Visit Diagnosis Plan: Essential (primary) hypertension

 Discussion: Stable

                                        ICD-9 : 401.9

ICD-10 : I10

                                                    2018

 

                                        Appointment: Akanksha Driver

WPtel:[phone]

                                        [facility]

[place]                                              FOLLOW UP       2018

 

                          Visit Diagnosis Plan: Gastro-esophageal reflux disease

 without esophagitis 

Discussion: Continue protonix at BID dosing Continue carafate at q AC and HS 
dosing for 2 more weeks then decrease to BID dosing

Follow Up: 4 weeks

                                        ICD-9 : 530.81

ICD-10 : K21.9

                                                    10/02/2018

 

                          Visit Diagnosis Plan: Urinary tract infection, site no

t specified Discussion: 

Reculture urine

                                        ICD-9 : 599.0

ICD-10 : N39.0

                                                    10/02/2018

 

                          Visit Diagnosis Plan: Generalized anxiety disorder Dis

cussion: Increase xanax to

BID dosing especially with upcoming cardiac testing which is already making 
patient more anxious and worried

                                        ICD-9 : 300.00

ICD-10 : F41.1

                                                    10/02/2018

 

                          Visit Diagnosis Plan: Palpitations Discussion: Bobby walter going to schedule 

Lexiscan

                                        ICD-9 : 785.1

ICD-10 : R00.2

                                                    10/02/2018

 

                                        Appointment: Akanksha Driver

WPtel:[phone]

                                        [address] [place] [address]                                              FOLLOW UP       10/02/2018

 

             Patient Education: Patient Medication Summary                      

     Completed    10/02/2018

 

                                        Appointment: Akanksha Driver

WPtel:[phone]

                                        [address] [place] [address]                                              LAB             2018

 

             Patient Education: Patient Medication Summary                      

     Completed    2018

 

                          Visit Diagnosis Plan: Epigastric pain Discussion: Cont

inue protonix and carafate

but make into a slurry Flu shot given Discussed EGD if symptoms persist

Follow Up: 2 weeks

                                        ICD-9 : 789.06

ICD-10 : R10.13

                                                    2018

 

                          Visit Diagnosis Plan: Generalized anxiety disorder Dis

cussion: Did discuss 

increased risk of benzodiazepines causing dementia but at this time will stay at
xanax 0.25mg po BID

                                        ICD-9 : 300.00

ICD-10 : F41.1

                                                    2018

 

                                        Appointment: Akanksha Driver

WPtel:[phone]

                                        [address] [place] [address]                                              FOLLOW UP       2018

 

             Patient Education: Patient Medication Summary                      

     Completed    2018

 

                          Visit Diagnosis Plan: Essential (primary) hypertension

 Discussion: Has 

hydralazine to use prn per cardiology Going to do 30 day holter monitor

                                        ICD-9 : 401.9

ICD-10 : I10

                                                    2018

 

                          Visit Diagnosis Plan: Hypoglycemia, unspecified Discus

madeleine: 6 small meals a 

day--each with protein Increase fluid intake

                                        ICD-9 : 251.2

ICD-10 : E16.2

                                                    2018

 

                          Visit Diagnosis Plan: Gastro-esophageal reflux disease

 without esophagitis 

Discussion: Change to protonix 40mg po BID

Follow Up: 1 months

                                        ICD-9 : 530.81

ICD-10 : K21.9

                                                    2018

 

                                        Appointment: Akanksha Driver

WPtel:[phone]

                                        [address] [place] [address]                                              ACUTE ILLNESS   2018

 

             Patient Education: Patient Medication Summary                      

     Completed    2018

 

                          Visit Diagnosis Plan: Dizziness and giddiness Discussi

on: blood work to be 

completed in office including cmp, cbc, troponin to rule out glucose 
intolerance, infection, dehydration. instructed patient that she needs to see 
her cardiologist sooner than oct and patient requested we contact dr. clements's 
office. will have front office make appt. patient has carotid doppler annually.

                                        ICD-9 : 780.4

ICD-10 : R42

                                                    2018

 

                                        Appointment: Nat Lozoya

                                        [address] [place] [address]                                              ACUTE ILLNESS   2018

 

             Patient Education: Patient Medication Summary                      

     Completed    2018

 

                          Visit Diagnosis Plan: Cervicalgia Discussion: Complete

 course of PT since 

symptoms improved Continue stretching exercises Notify if symptoms return or 
worsen

                                        ICD-9 : 723.1

ICD-10 : M54.2

                                                    2018

 

                                        Appointment: Akanksha Driver

WPtel:[phone]

                                        [address] [place], [place] [address]                                              FOLLOW UP       2018

 

             Patient Education: Patient Medication Summary                      

     Completed    2018

 

                          Visit Diagnosis Plan: Hypoglycemia, unspecified Discus

madeleine: Eat something with 

protein every 2 hrs

                                        ICD-9 : 251.2

ICD-10 : E16.2

                                                    2018

 

                          Visit Diagnosis Plan: Other spondylosis with radiculop

athy, cervical region 

Discussion: Check MRI of cervical spine

                                        ICD-9 : 721.0

ICD-10 : M47.22

                                                    2018

 

                          Visit Diagnosis Plan: Vertigo of central origin, bilat

eral Discussion: Discussed

PT for vestibular therapy

                                        ICD-9 : 386.2

ICD-10 : H81.43

                                                    2018

 

                                        Appointment: Akanksha Driver

WPtel:[phone]

                                        [facility]

                                                              2018

 

             Patient Education: Patient Medication Summary                      

     Completed    2018

 

                    Care Plan: MRI NECK SPINE W/O DYE                     LOINC 

: [id number]

                          Pending                   2018

 

                          Visit Diagnosis Plan: Otalgia, bilateral Discussion: k

enalog 40 mg given to 

patient im. instructed patient to monitor blood sugar at home due to risk of 
increased sugar. instructed patient to rtc on wednesday if no improvement and 
may require antibiotic.

                                        ICD-9 : 388.70

ICD-10 : H92.03

                                                    2018

 

                          Visit Diagnosis Plan: Benign paroxysmal vertigo, bilat

eral Discussion: patient 

has meclizine at home but states it makes her too tired. instructed patient to 
cut in half and take at night. if it doesn't cause too much drowsiness, 
instructed to take bid until feeling better. instructed to rtc wed if no 
improvement or worsening symptoms. instructed patient to increase fluid intake 
as well.

                                        ICD-9 : 386.11

ICD-10 : H81.13

                                                    2018

 

                                        Appointment: Nat Lozoya

                                        [address] [place] [address]                                              ACUTE ILLNESS   2018

 

             Patient Education: Patient Medication Summary                      

     Completed    2018

 

                          Visit Diagnosis Plan: Left lower quadrant pain Discuss

ion: Culture urine Notify 

if worsens

                                        ICD-9 : 789.04

ICD-10 : R10.32

                                                    2018

 

                          Visit Diagnosis Plan: Low back pain Discussion: Stretc

hes Topical aspercreme 

Tylenol 1 po TID

                                        ICD-9 : 724.2

ICD-10 : M54.5

                                                    2018

 

                          Visit Diagnosis Plan: Radiculopathy, lumbosacral regio

n Discussion: As above

                                        ICD-9 : 724.4

ICD-10 : M54.17

                                                    2018

 

                                        Appointment: Akanksha Driver

WPtel:[phone]

                                        [address] [place] [address]                                              ACUTE ILLNESS   2018

 

             Patient Education: Patient Medication Summary                      

     Completed    2018

 

                                        Appointment: Akanksha Driver

WPtel:[phone]

                                        [address] [place] [address]

[place]                                              INJECTION       10/06/2017

 

             Patient Education: Patient Medication Summary                      

     Completed    10/06/2017

 

                                        Appointment: Akanksha Driver

WPtel:[phone]

                                        [address] [place] [address]

[place]                                              LAB             2017

 

             Patient Education: Patient Medication Summary                      

     Completed    2017

 

                          Visit Diagnosis Plan: Pain in thoracic spine Discussio

n: PT has helped Continue 

stretches and walking [facility]

                                        ICD-9 : 724.1

ICD-10 : M54.6

                                                    2017

 

                          Visit Diagnosis Plan: Other intervertebral disc degene

ration, lumbar region 

Follow Up: 3 months

                                        ICD-9 : 722.52

ICD-10 : M51.36

                                                    2017

 

                                        Appointment: Akanksha Driver

WPtel:[phone]

                                        [address] [place], [place] [id number]

[place]                                              FOLLOW UP       2017

 

             Patient Education: Patient Medication Summary                      

     Completed    2017

 

                          Visit Diagnosis Plan: Low back pain Discussion: Start 

PT for low back- Seems 

like abdominal pain is radiating from low back

Follow Up: 5 weeks

                                        ICD-9 : 724.2

ICD-10 : M54.5

                                                    2017

 

                          Visit Diagnosis Plan: Left lower quadrant pain Discuss

ion: Had CT scan of 

abdomen/pelvis in 2017 and normal colonoscopy in 2015

                                        ICD-9 : 789.04

ICD-10 : R10.32

                                                    2017

 

                                        Appointment: Akanksha Driver

WPtel:[phone]

                                        [address] [place], [place] [id number]                                              ACUTE ILLNESS   2017

 

             Patient Education: Patient Medication Summary                      

     Completed    2017

 

                                        Appointment: Akanksha Driver

WPtel:[phone]

                                        [address] [place] [address]

[place]                                              LAB             2017

 

             Patient Education: Patient Medication Summary                      

     Completed    2017

 

                          Visit Diagnosis Plan: Mixed hyperlipidemia Discussion:

 Check lipids

                                        ICD-9 : 272.4

ICD-10 : E78.2

                                                    2017

 

                          Visit Diagnosis Plan: Impaired fasting glucose Discuss

ion: Return in AM for CMP,

HbA1C Accuchecks daily Diet/Exercise discussed at length

                                        ICD-9 : 790.29

ICD-10 : R73.01

                                                    2017

 

                          Visit Diagnosis Plan: Other fatigue Discussion: Check 

CBC, TSH

                                        ICD-9 : 780.79

ICD-10 : R53.83

                                                    2017

 

                          Visit Diagnosis Plan: Mastodynia Discussion: Check Jace

ateral diagnostic 

mammogram with US

                                        ICD-9 : 611.71

ICD-10 : N64.4

                                                    2017

 

                                        Appointment: Akanksha Driver

WPtel:[phone]

                                        [address] [place], [place] [id number]              3/7 confirmed `sl                 ACUTE ILLNESS   2017

 

             Patient Education: Patient Medication Summary                      

     Completed    2017

 

                    Care Plan: MAMMOGRAM SCREENING                     LOINC : 2

[id number]

                          Pending                   2017

 

                          Visit Diagnosis Plan: Acute upper respiratory infectio

n, unspecified Discussion:

Exam is reassuring Likely viral illness Supportive care reviewed and encouraged 
Follow up PRN

                                        ICD-9 : 465.9

ICD-10 : J06.9

                                                    2017

 

                                        Appointment: Luba Stevenson 

                                        [address] [place] [address]                                              ACUTE ILLNESS   2017

 

             Patient Education: Patient Medication Summary                      

     Completed    2017

 

                          Visit Plan:               Supportive care. Rest, Fluid

s, Tylenol/Motrin prn fever or 

bodyaches. Notify if worsening symptoms.

                                                            2016

 

                                        Appointment: Akanksha Driver

WPtel:[phone]

                                        [address] [place] [address]                                              ACUTE ILLNESS   2016

 

             Patient Education: Patient Medication Summary                      

     Completed    2016

 

                                        Appointment: Akanksha Driver

WPtel:[phone]

                                        [address] [place], [place] [id number]

[place]                                              INJECTION       10/07/2016

 

             Patient Education: Patient Medication Summary                      

     Completed    10/07/2016

 

                                        Appointment: Akanksha Drivertel:[phone]

                                        [address] [place], [place] [id number]

[place]                                              LAB             2016

 

             Patient Education: Patient Medication Summary                      

     Completed    2016

 

                          Visit Plan:               Add miralax daily Use daily 

probiotic and metamucil and push fluids

Notify if worsens/persists

                                                            2016

 

                                        Appointment: Akanksha Driver

WPtel:[phone]

                                        [address] [place], [place] [id number]              16 confirmed ~sl                 ACUTE ILLNESS   2016

 

             Patient Education: Patient Medication Summary                      

     Completed    2016

 

                          Visit Plan:               No NSAIDs Kidney function di

scussed Discussed hydration Monitor lab

every 4months so will check CMP, HbA1C next month

                                                            2016

 

                                        Appointment: Akanksha Driver

WPtel:[phone]

                                        [address] [place], [place] [address]              03/15 confirmed ~sl                 ACUTE ILLNESS   2016

 

             Patient Education: Patient Medication Summary                      

     Completed    2016

 

                          Visit Plan:               Vestibular exercises Refill 

flonase Strict low Na diet--discussed 

that needs to read labels Rx for walker with chair given due to muscle 
weakness/unsteadiness Has carotids and abdominal aorta and legs checked in 
January Check Chem 7

                                                            2015

 

                                        Appointment: Akanksha Driver

WPtel:[phone]

                                        [address] [place], [place] [id number]              12/15/15 appt confirmed cn                 FOLLOW UP       

 

             Patient Education: Patient Medication Summary                      

     Completed    2015

 

             Patient Education: Vertigo                           Completed    1

2015

 

                                        Appointment: Akanksha Driver

WPtel:[phone]

                                        [address] [place], [place] [id number]

[place]                                              INJECTION       10/16/2015

 

             Patient Education: Patient Medication Summary                      

     Completed    10/16/2015

 

                                        Appointment: Akanksha Driver

WPtel:[phone]

                                        [address] [place], [place] [address]                                              UA              09/15/2015

 

             Patient Education: Patient Medication Summary                      

     Completed    09/15/2015

 

                                        Referral: Landon De La Torre

WPtel:[phone]

#1 [facility]

[address]              Referral                        Completed       2015

 

                                        Appointment: Akanksha Driver

WPtel:[phone]

                                        [address] [place] [address]                                              LAB             09/10/2015

 

             Patient Education: Patient Medication Summary                      

     Completed    09/10/2015

 

                          Visit Plan:               Check CMP, CBC, TSH, Free T4

, B12, Lipids, HbA1C Discussed 6 small 

meals a day each with protein Would likely benefit from antidepressant Update 
colonoscopy

                                                            2015

 

                                        Appointment: Akanksha Driver

WPtel:[phone]

                                        [address] [place], [place] [id number]

              9/8/15 confirmed                 ACUTE ILLNESS   2015

 

             Patient Education: Patient Medication Summary                      

     Completed    2015

 

                          Visit Plan:               Cerumen flush with warm wate

r and peroxide - good results Resume 

Nasonex nasal spray daily Recommended Debrox earwax removal drops

                                                            2015

 

                                        Appointment: Bertha Mon

WPtel:[phone]

                                        [address] [place] [address]                                              ACUTE ILLNESS   2015

 

             Patient Education: Patient Medication Summary                      

     Completed    2015

 

                          Visit Plan:               Continue aspirin daily Add m

eloxicam for 1week Elevate and Ice Call

on 2015

 

                                        Appointment: Akanksha Driver

WPtel:[phone]

                                        [address] [place] [address]                                              ACUTE ILLNESS   2015

 

             Patient Education: Patient Medication Summary                      

     Completed    2015

 

                          Visit Plan:               Been using baclofen at [facility] and has helped some PT for next 

2-4weeks

                                                            2015

 

                                        Appointment: Akanksha Driver

WPtel:[phone]

                                        [address] [place] [address]                                              Hospital Follow Up 2015

 

             Patient Education: Patient Medication Summary                      

     Completed    2015

 

                                        Appointment: Akanksha Driver

WPtel:[phone]

                                        [address] [place] [address]                                              LAB             2015

 

                                        Appointment: Akanksha Driver

WPtel:[phone]

                                        [address] [place] [address]              feeling better, weather -                 FOLLOW UP       

 

                                        Referral: Landon De La Torre

WPtel:[phone]

1 [facility]66[facility]              Referral                        Appointment Requested 2015

 

                          Visit Plan:               Continue exercise and curren

t meds See surgery for removal of right

arm lesion

                                                            2015

 

                                        Appointment: Akanksha Driver

WPtel:[phone]

                                        [address] [place] [address]                                              FOLLOW UP       2015

 

             Patient Education: Patient Medication Summary                      

     Completed    2015

 

                                        Appointment: Akanksha Driver

WPtel:[phone]

                                        [address]

[id number]

[place]                                              INJECTION       10/17/2014

 

             Patient Education: Patient Medication Summary                      

     Completed    10/17/2014

 

                                        Appointment: Bertha Mon

WPtel:[phone]

                                        [address] [place] [address]                                              ACUTE ILLNESS   2014

 

             Patient Education: Patient Medication Summary                      

     Completed    2014

 

                          Visit Plan:               Discussed that likely lumbar

 etiology for leg weakness Will change 

amlodopine to low dose dyazide and see if helps legs and inner ear

                                                            2014

 

                                        Appointment: Akanksha Driver

WPtel:[phone]

                                        [address] [place] [address]                                              FOLLOW UP       2014

 

             Patient Education: Patient Medication Summary                      

     Completed    2014

 

                          Visit Plan:               Ceftin and continue claritin

/flonase Decrease amlodopine to 2.5mg q

HS until fwup with Card due to weakness

                                                            2014

 

                                        Appointment: Akanksha Driver

WPtel:[phone]

                                        [address] [place] [address]                                              ACUTE ILLNESS   2014

 

             Patient Education: Patient Medication Summary                      

     Completed    2014

 

                                        Appointment: Akanksha Driver

WPtel:[phone]

                                        [address] [place] [address]                                              LAB             2014

 

             Patient Education: Patient Medication Summary                      

     Completed    2014

 

                                        Appointment: Bertha Mon

WPtel:[phone]

                                        [address] [place] [address]                                              ACUTE ILLNESS   2014

 

             Patient Education: Patient Medication Summary                      

     Completed    2014

 

                          Visit Plan:               Supportive care. Rest, Fluid

s, Tylenol prn fever or bodyaches. 

Notify if worsening symptoms. Ceftin

                                                            10/15/2013

 

                                        Appointment: Bertha Mon

WPtel:[phone]

                                        [address] [place] [address]                                              ACUTE ILLNESS   10/15/2013

 

             Patient Education: Patient Medication Summary                      

     Completed    10/15/2013

 

                                        Appointment: Akanksha Driver

WPtel:[phone]

                                        [address] [place] [address] CHECK        2013

 

             Patient Education: Patient Medication Summary                      

     Completed    2013

 

                                        Appointment: Akanksha Driver

WPtel:[phone]

                                        [address] [place], [place] [id number]                                              ER Follow UP    2013

 

             Patient Education: Patient Medication Summary                      

     Completed    2013

 

                          Visit Plan:               Restart flonase BID Use mecl

izine 25mg q HS Vestibular exercises 

Claritin 10mg q AM See ENT if doesn't resolve

                                                            2013

 

                                        Appointment: Akanksha Driver

WPtel:[phone]

                                        [address] [place], [place] [id number]                                              ACUTE ILLNESS   2013

 

             Patient Education: Patient Medication Summary                      

     Completed    2013

 

                          Visit Plan:               Will continue to observe

                                                            2013

 

                                        Appointment: Akanksha Driver

WPtel:[phone]

                                        [address] [place], [place] [id number]                                              FOLLOW UP       2013

 

             Patient Education: Patient Medication Summary                      

     Completed    2013

 

                          Visit Plan:               ERx for Augmentin 875mg q12 

x 10 days and Floxin otic drops 5 gtts 

AD x7days Sample of Nasonex per pt request Discussed treatment (nasal saline, 
salt water gargles, keeping right ear clean and dry, for worsening go to UC on 
weekend, Tylenol/ibuprofen, etc.) RTC 2 weeks for ear recheck.

                                                            05/10/2013

 

                                        Appointment: Jill Jean 

WPtel:[phone]

                                        [address] [place], [place] [id number]                                              ACUTE ILLNESS   05/10/2013

 

             Patient Education: Patient Medication Summary                      

     Completed    05/10/2013

 

                          Visit Plan:               Decrease caffeine intake Marina

ck Bilateral Mammogram with US of left 

breast

                                                            2013

 

                                        Appointment: Akanksha Driver

WPtel:[phone]

                                        [address] [place], [place] [address]                                              ACUTE ILLNESS   2013

 

             Patient Education: Patient Medication Summary                      

     Completed    2013

 

                                        Appointment: Kate Hall

WPtel:[phone]

                                        [address] [place], [place] [id number]              appt scheduled                  ACUTE ILLNESS   2013

 

                                        Appointment: Akanksha Driver

WPtel:[phone]

                                        [address] [place], [place] [id number]

                                              LAB             2012

 

             Patient Education: Patient Medication Summary                      

     Completed    2012

 

                          Visit Plan:               Continue off carafate and se

e how does Continue probiotic Add 

Vestibular exercises and use meclizine prn Continue Nasonex Check fasting lab 
including CMP, Lipids, CBC, TSH, Free T4, HbA1C

                                                            2012

 

                                        Appointment: Akanksha Driver

WPtel:[phone]

                                        [address] [place], [place] [id number]

                                              FOLLOW UP       2012

 

             Patient Education: Patient Medication Summary                      

     Completed    2012

 

                          Visit Plan:               Continue carafate for 4more 

weeks at current dose then decrease to 

BID for 2wks then q HS

                                                            10/23/2012

 

                                        Appointment: Akanksha Driver

WPtel:[phone]

                                        [address] [place], [place] [id number]

                                              FOLLOW UP       10/23/2012

 

             Patient Education: Patient Medication Summary                      

     Completed    10/23/2012

 

                          Visit Plan:               Continue omeprazole Add Ellyn

fate 1gm po q AC Add daily probiotic

                                                            10/10/2012

 

                                        Appointment: Akanksha Driver

WPtel:[phone]

                                        [address] [place], [place] [id number]

                                              ACUTE ILLNESS   10/10/2012

 

             Patient Education: Patient Medication Summary                      

     Completed    10/10/2012

 

                                        Appointment: Akanksha Driver

WPtel:[phone]

                                        [address] [place], [place] [id number]

US                                              INJECTION       2012

 

             Patient Education: Patient Medication Summary                      

     Completed    2012

 

                          Visit Plan:               Diabetic Diet Accuchecks BID

 alternating times Hold onglyza and 

Januvia for now and continue diet and exercise and weight loss and drew LOREDO 
Discussed hypoglycemia and snack of peanut butter and crackers with juice or 
milk

                                                            2012

 

                                        Appointment: Akanksha Driver

WPtel:[phone]

                                        [address] [place], [place] [id number]

                                              WORK IN         2012

 

             Patient Education: Patient Medication Summary                      

     Completed    2012

 

                                        Appointment: Akanksha Driver

WPtel:[phone]

                                        [address] [place] [address]              2012

 

             Patient Education: Patient Medication Summary                      

     Completed    2012

 

                                        Appointment: Akanksha Driver

WPtel:[phone]

                                        [address] [place] [address]             2012

 

             Patient Education: Patient Medication Summary                      

     Completed    2012

 

                                        Appointment: Akanksha Driver

WPtel:[phone]

                                        [address] [place] [address]                                              ACUTE ILLNESS   2012

 

             Patient Education: Patient Medication Summary                      

     Completed    2012

 

                          Visit Plan:               Injection to Right SI joint 

as above Pt will call in 3 days on pain

Has PT starting in 2wks.

                                                            2012

 

                                        Appointment: Akanksha Driver

WPtel:[phone]

                                        [address] [place] [address]                                              ACUTE ILLNESS   2012

 

             Patient Education: Patient Medication Summary                      

     Completed    2012

 

                          Visit Plan:               OMT done Start PT May need u

pdated MRI if need to consider epidural

Prednisone

                                                            2012

 

                                        Appointment: Akanksha Driver

WPtel:[phone]

                                        [address] [place] [address]                                              OMT             2012

 

             Patient Education: Patient Medication Summary                      

     Completed    2012

 

                          Visit Plan:               Flexeril and Vimovo OMT done

 Daily stretches

                                                            2012

 

                                        Appointment: Akanksha Driver

WPtel:[phone]

                                        [address] [place] [address]                                              ACUTE ILLNESS   2012

 

             Patient Education: Patient Medication Summary                      

     Completed    2012

 

                          Visit Plan:               OMT done Daily stretches Vinod

st heat or biofreeze Right SI joint 

injection Call in 1week Vimovo BID

                                                            2012

 

                                        Appointment: Akanksha Driver

WPtel:[phone]

                                        [address] [place] [address]                                              ACUTE ILLNESS   2012

 

             Patient Education: Patient Medication Summary                      

     Completed    2012

 

                                        Appointment: Akanksha Drivertel:[phone]

                                        [address] [place], [place] [id number]

                                              LAB             2012

 

             Patient Education: Patient Medication Summary                      

     Completed    2012

 

                          Visit Plan:               Decrease amlodopine to 1.25m

g daily Schedule with Cardiology 

Fasting lab in AM

                                                            2012

 

                                        Appointment: Akanksha Driver

WPtel:[phone]

                                        [address] [place], [place] [id number]                                              ACUTE ILLNESS   2012

 

             Patient Education: Patient Medication Summary                      

     Completed    2012

 

                          Visit Plan:               Saline nasal flushes prn. Ty

lenol/Motrin prn headache. Notify if 

persists/symptoms worsening. Cerumen removal from ears as above

                                                            2011

 

                                        Appointment: Akanksha Driver

WPtel:[phone]

                                        [address] [place] [address]                                              ACUTE ILLNESS   2011

 

             Patient Education: Patient Medication Summary                      

     Completed    2011

 

                                        Appointment: Akanksha Driver

WPtel:[phone]

                                        [address] [place] [address]

[place]                                              INJECTION       10/05/2011

 

             Patient Education: Patient Medication Summary                      

     Completed    10/05/2011

 

                          Visit Plan:               Continue vimovo for 1more we

ek Increase Omeprazole to BID for 1mo 

then resume QD if stomach improved Vestibular exercises with meclizine q HS for 
next week

                                                            2011

 

                                        Appointment: Akanksha Driver

WPtel:[phone]

                                        [address] [place], [place] [id number]

                                              FOLLOW UP       2011

 

             Patient Education: Patient Medication Summary                      

     Completed    2011

 

                                        Appointment: Akanksha Driver

WPtel:[phone]

                                        [address] [place] [address]                                              ACUTE ILLNESS   2011

 

             Patient Education: Patient Medication Summary                      

     Completed    2011

 

                                        Appointment: Akanksha Driver

WPtel:[phone]

                                        [address]                                              LAB             2011

 

             Patient Education: Patient Medication Summary                      

     Completed    2011

 

                          Visit Plan:               Saline nasal flushes prn. Ty

lenol/Motrin prn headache. Notify if 

persists/symptoms worsening. Add Veramyst Increase Omeprazole to 20mg po BID

                                                            2011

 

                                        Appointment: Akanksha Driver

WPtel:[phone]

                                        [address] [place] [address]                                              ACUTE ILLNESS   2011

 

             Patient Education: Patient Medication Summary                      

     Completed    2011

 

                                        Appointment: Akanksha Driver

WPtel:[phone]

                                        [address] [place] [address]                                              FOLLOW UP       2011

 

             Patient Education: Patient Medication Summary                      

     Completed    2011

 

                                        Appointment: Akanksha Driver

WPtel:[phone]

                                        [address] [place] [address]                                              ACUTE ILLNESS   2011

 

             Patient Education: Patient Medication Summary                      

     Completed    2011

 

                          Visit Plan:               Nasocourt sample given. Pt. 

will notify if symptoms are worse on 

Monday.

                                                            2010

 

                                        Appointment: Kate Hall

WPtel:[phone]

                                        [address] [place] [address]                                              ACUTE ILLNESS   2010

 

             Patient Education: Patient Medication Summary                      

     Completed    2010

 

                                        Appointment: Akanksha Driver

WPtel:[phone]

                                        [address] [place], [place] [address]

[place]                                              INJECTION       10/06/2010

 

             Patient Education: Patient Medication Summary                      

     Completed    10/06/2010

 

                                        Appointment: Akanksha Driver

WPtel:[phone]

                                        [address] [place] [address]                                              FOLLOW UP       2010

 

             Patient Education: Patient Medication Summary                      

     Completed    2010

 

             Patient Education: Lexapro                           Completed    0

2010

 

                          Visit Plan:               May proceed with hiatal mykel

ia repair per Card. so will contact Dr. Cash's office to proceed with surgery Trial of Lexapro 5mg QD plus use 
xanax prn

                                                            2010

 

                                        Appointment: Akanksha Driver

WPtel:[phone]

                                        [address] [place] [address]                                              FOLLOW UP       2010

 

             Patient Education: Patient Medication Summary                      

     Completed    2010

 

                          Visit Plan:               B12 given Cont oral B12 and 

iron Fwup 1mo for B12 Proceed with 

hiatal hernia repair once card clearance

                                                            2010

 

                                        Appointment: Akanksha Driver

WPtel:[phone]

                                        [address] [place] [address]                                              FOLLOW UP       2010

 

             Patient Education: Patient Medication Summary                      

     Completed    2010

 

                                        Appointment: Akanksha Driver

WPtel:[phone]

                                        [address] [place] [address]                                              FOLLOW UP       2010

 

             Patient Education: Patient Medication Summary                      

     Completed    2010

 

                                        Appointment: Akanksha Driver

WPtel:[phone]

                                        [address] [place] [address]

[place]                                              LAB             2010

 

             Patient Education: Patient Medication Summary                      

     Completed    2010

 

                          Visit Plan:               Check fasting lab in AM--CMP

,Lipids, CBC, Vit D, TSH,FreeT4, B12

                                                            2010

 

                                        Appointment: Akanksha Driver

WPtel:[phone]

                                        [address] [place] [address]                                              FOLLOW UP       2010

 

             Patient Education: Patient Medication Summary                      

     Completed    2010

 

                                        Referral: Landon De La Torre

WPtel:[phone]

[address] [place] [address]

[place]              Referral                        Appointment Requested  

 

                                        Referral: Landon De La Torre

WPtel:[phone]

[address]              Referral                        Initiated        







Instructions





                                        Comment

 

                                        . Supportive care.  Rest, Fluids, Tyleno

l/Motrin prn fever or bodyaches.  Notify

if worsening symptoms.



 

                                        . Add miralax daily

Use daily probiotic and metamucil and push fluids

Notify if worsens/persists

 

                                        . No NSAIDs

Kidney function discussed

Discussed hydration 

Monitor lab every 4months so will check CMP, HbA1C next month

 

                                        . Vestibular exercises

Refill flonase

Strict low Na diet--discussed that needs to read labels

Rx for walker with chair given due to muscle weakness/unsteadiness

Has carotids and abdominal aorta and legs checked in January

Check Chem 7



 

                                        . Check CMP, CBC, TSH, Free T4, B12, Lip

ids, HbA1C

Discussed 6 small meals a day each with protein

Would likely benefit from antidepressant 

Update colonoscopy

 

                                        . Cerumen flush with warm water and tyler

xide - good results 

Resume Nasonex nasal spray daily

Recommended Debrox earwax removal drops 

 

                                        . Continue aspirin daily

Add meloxicam for 1week

Elevate and Ice

Call on Monday

 

                                        . Been using baclofen at bedtime and has

 helped some

PT for next 2-4weeks



 

                                        . Continue exercise and current meds

See surgery for removal of right arm lesion

 

                                        . Discussed that likely lumbar etiology 

for leg weakness

Will change amlodopine to low dose dyazide and see if helps legs and inner ear

 

                                        . Ceftin and continue claritin/flonase

Decrease amlodopine to 2.5mg q HS until fwup with Card due to weakness

 

                                        .  Supportive care.  Rest, Fluids, Tylen

ol prn fever or bodyaches.  Notify if 

worsening symptoms.

Ceftin

 

                                        . Restart flonase BID

Use meclizine 25mg q HS

Vestibular exercises

Claritin 10mg q AM

See ENT if doesn't resolve

 

                                        . Will continue to observe



 

                                        . ERx for Augmentin 875mg q12 x 10 days 

and Floxin otic drops 5 gtts AD x7days

Sample of Nasonex per pt request

Discussed treatment (nasal saline, salt water gargles, keeping right ear clean 
and dry, for worsening go to UC on weekend, Tylenol/ibuprofen, etc.)

RTC 2 weeks for ear recheck.

 

                                        . Decrease caffeine intake

Check Bilateral Mammogram with US of left breast

 

                                        Left ear flushed with warm water and per

oxide with ear syringe and then last 

removed with currette--peroxide drops instilled.  Tolerated well, no 
complications, TM intact.. Continue off carafate and see how does

Continue probiotic

Add Vestibular exercises and use meclizine prn

Continue Nasonex

Check fasting lab including CMP, Lipids, CBC, TSH, Free T4, HbA1C

 

                                        . Continue carafate for 4more weeks at c

urrent dose then decrease to BID for 

2wks then q HS

 

                                        . Continue omeprazole

Add Carafate 1gm po q AC

Add daily probiotic



 

                                        . Diabetic Diet

Accuchecks BID alternating times

Hold onglyza and Januvia for now and continue diet and exercise and weight loss 
and moniter BS

Discussed hypoglycemia and snack of peanut butter and crackers with juice or 
milk

 

                                        Informed Consent obtainded.  Right SI yasir

int cleansed with alcohol and betadine 

and injected with 3cc 1%l lidocaine with 40mg depomedrol and 40mg kenalog, 
tolerated well with no complications, neosporin and bandage applied. Injection 
to Right SI joint as above

Pt will call in 3 days on pain

Has PT starting in 2wks.

 

                                        . OMT done

Start PT

May need updated MRI if need to consider epidural

Prednisone

 

                                        . Flexeril and Vimovo

OMT done

Daily stretches

 

                                        Right SI joint cleansed with alchohol an

d betadine and injected with 3cc 1% 

lidocaine with 40mg depomedrol and 40mg kenalog, tolerated well with no 
complications, neosporin and bandage applied. OMT done

Daily stretches

Moist heat or biofreeze

Right SI joint injection

Call in 1week

Vimovo BID

 

                                        . Decrease amlodopine to 1.25mg daily

Schedule with Cardiology

Fasting lab in AM

 

                                        .  Saline nasal flushes prn. Tylenol/Mot

rin prn headache.  Notify if 

persists/symptoms worsening.

Cerumen removal from ears as above



 

                                        . Continue vimovo for 1more week

Increase Omeprazole to BID for 1mo then resume QD if stomach improved

Vestibular exercises with meclizine q HS for next week

 

                                        . Saline nasal flushes prn. Tylenol/Motr

in prn headache.  Notify if 

persists/symptoms worsening.

Add Veramyst

Increase Omeprazole to 20mg po BID

 

                                        . [place]ourt sample given.  Pt. will noti

fy if symptoms are worse on Monday. 

 

                                        . May proceed with hiatal hernia repair 

per Card. so will contact Dr. Cash's office to proceed with surgery



Trial of Lexapro 5mg QD plus use xanax prn

 

                                        . B12 given

Cont oral B12 and iron

Fwup 1mo for B12

Proceed with hiatal hernia repair once card clearance

 

                                        . Check fasting lab in AM--CMP,Lipids, C

BC, Vit D, TSH,FreeT4, B12







Medical Equipment

No Medical Equipment data



Health Concerns Section

Health Concerns data not found



Goals Section

Goals data not found



Interventions Section

Interventions data not found



Health Status Evaluations/Outcomes Section

Health Status Evaluations/Outcomes data not found



Advance Directives

No Advance Directive data

## 2020-02-19 NOTE — XMS REPORT
CCD document using C-CDA

                             Created on: 2020



Leilani Ramírez

External Reference #: [id number]

: 1940

Sex: Female



Demographics





                          Address                   [address] [place]  [address]

 

                          Home Phone                [phone]

 

                          Preferred Language        Unknown

 

                          Marital Status            

 

                          Confucianist Affiliation     Unknown

 

                          Race                      White

 

                          Ethnic Group              Not  or 





Author





                          Author                    Leilani Driver D.O.

 

                          Organization              [facility]

 

                          Address                   [address] [place]  [address]



 

                          Phone                     [phone]







Care Team Providers





                    Care Team Member Name Role                Phone

 

                    Akanksha Driver D.O. PP                  Unavailable

 

                          CCM                       Unavailable







Summary Purpose

Interface Exchange



Insurance Providers





             Payer name   Policy type / Coverage type Covered party ID Effective

 Begin Date 

Effective End Date

 

             [place] [place] [id number]  2018   

  Unknown

 

             [employer] Senior Supplemental Medicare Part B [id number]   [id number]    

 Unknown







Family history





Father



                          Diagnosis                 Age At Onset

 

                          Heart disease             Unknown







Mother



                          Diagnosis                 Age At Onset

 

                          Heart disease             Unknown

 

                          Cancer                    Unknown







Social History





                Social History Element Codes           Description     Effective

 Dates

 

                Tobacco history SNOMED CT: 872069233 Never smoker    2011

 

                Marital status  Unknown         Single          2010

 

                Number of children Unknown         9               2010







Allergies, Adverse Reactions, Alerts





           Substance  Reaction   Codes      Entered Date Inactivated Date Status

 

           _                     Unknown    2019 No Inactive Date Active

 

           * NO KNOWN FOOD ALLERGIES            Unknown    2010 No Inactiv

e Date Active

 

           _                     Unknown    2010 No Inactive Date Active

 

           QUINIDINE-QUININE ANALOGUES hives,     Unknown    2010 No Inact

diamante Date Active







Problems





                Condition       Codes           Effective Dates Condition Status

 

                          Essential (primary) hypertension ICD-9: 401.9

ICD-10: I10               2014                Active

 

                          Mixed hyperlipidemia      ICD-9: 272.4

ICD-10: E78.2             2019                Active

 

                          FLU VACCINE               ICD-9: V04.81

ICD-10: Z23               2012                Active

 

                          Acute serous otitis media, left ear ICD-9: 381.01

ICD-10: H65.02            2019                Active

 

                          Coronary atherosclerosis due to calcified coronary les

ion ICD-9: 414.00

ICD-10: I25.84            2018                Active

 

                          Hypoglycemia, unspecified ICD-9: 251.2

ICD-10: E16.2             2017                Active

 

                          Other fatigue             ICD-9: 780.79

ICD-10: R53.83            2017                Active

 

                          Urinary tract infection, site not specified ICD-9: 599

.0

ICD-10: N39.0             10/02/2018                Active

 

                          Gastro-esophageal reflux disease without esophagitis I

CD-9: 530.81

ICD-10: K21.9             2018                Active

 

                          Epigastric pain           ICD-9: 789.06

ICD-10: R10.13            2018                Active

 

                          Atherosclerotic heart disease of native coronary arter

y without angina pectoris 

ICD-9: 414.00

ICD-10: I25.10            2018                Active

 

                          Generalized anxiety disorder ICD-9: 300.00

ICD-10: F41.1             2018                Active

 

                          Palpitations              ICD-9: 785.1

ICD-10: R00.2             10/02/2018                Active

 

                          Dizziness and giddiness   ICD-9: 780.4

ICD-10: R42               2014                Active

 

                          Occlusion and stenosis of bilateral carotid arteries I

CD-9: 433.10

ICD-10: I65.23            2018                Active

 

                          Cervicalgia               ICD-9: 723.1

ICD-10: M54.2             2018                Active

 

                          Other spondylosis with radiculopathy, cervical region 

ICD-9: 721.0

ICD-10: M47.22            2018                Active

 

                          Cervicocranial syndrome   ICD-9: 723.2

ICD-10: M53.0             2018                Active

 

                          Vertigo of central origin, bilateral ICD-9: 386.2

ICD-10: H81.43            2018                Active

 

                          Benign paroxysmal vertigo, bilateral ICD-9: 386.11

ICD-10: H81.13            2018                Active

 

                          Otalgia, bilateral        ICD-9: 388.70

ICD-10: H92.03            2018                Active

 

                          Left lower quadrant pain  ICD-9: 789.04

ICD-10: R10.32            2017                Active

 

                          Low back pain             ICD-9: 724.2

ICD-10: M54.5             2017                Active

 

                          Radiculopathy, lumbosacral region ICD-9: 724.4

ICD-10: M54.17            2018                Active

 

                          Impaired fasting glucose  ICD-9: 790.29

ICD-10: R73.01            2012                Active

 

                          Other intervertebral disc degeneration, lumbar region 

ICD-9: 722.52

ICD-10: M51.36            2017                Active

 

                          Pain in thoracic spine    ICD-9: 724.1

ICD-10: M54.6             2017                Active

 

                          Mastodynia                ICD-9: 611.71

ICD-10: N64.4             2017                Active

 

                          Acute upper respiratory infection, unspecified ICD-9: 

465.9

ICD-10: J06.9             2017                Active

 

                          Acute mastoiditis without complications, right ear ICD

-9: 383.00

ICD-10: H70.001           2016                Active

 

                          Constipation, unspecified ICD-9: 564.00

ICD-10: K59.00            2016                Active

 

                          Chronic kidney disease, stage 1 ICD-9: 585.1

ICD-10: N18.1             03/15/2016                Active

 

                          History of falling        ICD-9: V15.88

ICD-10: Z91.81            12/15/2015                Active

 

                          Muscle weakness (generalized) ICD-9: 780.79

ICD-10: M62.81            2015                Active

 

                Acute renal failure ICD-9: 584.9    2015      Active

 

                POLYURIA        ICD-9: 788.42   2015      Active

 

                CAD             ICD-9: 414.00   09/10/2015      Active

 

                Hyperglycemia   ICD-9: 790.29   2012      Active

 

                HYPERLIPIDEMIA NEC/NOS ICD-9: 272.4    09/10/2015      Active

 

                ABDOMINAL PAIN  ICD-9: 789.00   10/10/2012      Active

 

                Grieving        ICD-9: 309.0    2015      Active

 

                HYPOGLYCEMIA    ICD-9: 251.2    2012      Active

 

                MALAISE AND FATIGUE ICD-9: 780.79   2015      Active

 

                CERUMEN IMPACTION ICD-9: 380.4    2015      Active

 

                Leg pain        ICD-9: 729.5    2015      Active

 

                SUPERFIC PHLEBITIS-LEG ICD-9: 451.0    2015      Active

 

                SPASM OF MUSCLE ICD-9: 728.85   2015      Active

 

                Thoracic back pain ICD-9: 724.1    2015      Active

 

                Benign positional vertigo ICD-9: 386.11   2015      Active

 

                Suspicious nevus ICD-9: 238.2    2015      Active

 

                EUSTACHIAN TUBE DYSFUNCTION ICD-9: 381.81   2014      Acti

ve

 

                SINUSITIS, ACUTE ICD-9: 461.9    2014      Active

 

                DIZZINESS/VERTIGO ICD-9: 780.4    2014      Active

 

                HYPERTENSION    ICD-9: 401.9    2014      Active

 

                URI, ACUTE      ICD-9: 465.9    10/15/2013      Active

 

                CEPHALGIA       ICD-9: 784.0    2013      Active

 

                OTITIS MEDIA NOS ICD-9: 382.9    2013      Active

 

                Perforation of ear drum ICD-9: 384.20   05/10/2013      Active

 

                Mastalgia       ICD-9: 611.71   2013      Active

 

                DYSPEPSIA       ICD-9: 536.8    10/10/2012      Active

 

                IBS             ICD-9: 564.1    10/10/2012      Active

 

                FLU VACCINE     ICD-9: V04.81   2012      Active

 

                Radiculopathy of leg ICD-9: 724.4    2012      Active

 

                SCIATICA        ICD-9: 724.3    2012      Active

 

                Lumbar degenerative disc disease ICD-9: 722.52   2012     

 Active

 

                Sacroiliac dysfunction ICD-9: 739.4    2012      Active

 

                Hypertension    Unknown         2012      Active

 

                PAIN, LOWER BACK ICD-9: 724.2    2011      Active

 

                SPINAL ENTHESOPATHY ICD-9: 720.1    2011      Active

 

                Hypotension     ICD-9: 458.9    2011      Active

 

                SACROILIITIS NEC ICD-9: 720.2    2011      Active

 

                PHARYNGITIS, ACUTE ICD-9: 462      2010      Active

 

                URINARY TRACT INFECTION ICD-9: 599.0    2010      Active

 

                Heat exhaustion ICD-9: 992.5    2010      Active

 

                Esophagitis     ICD-9: 530.10   2010      Active

 

                Gastritis       ICD-9: 535.50   2010      Active

 

                GERD            ICD-9: 530.81   2010      Active

 

                Hiatal hernia   ICD-9: 553.3    2010      Active

 

                B12 DEFIC ANEMIA NEC ICD-9: 281.1    2010      Active

 

                Anxiety         Unknown         2010      Active

 

                Heart disease   Unknown         2010      Active

 

                Hyperlipidemia  Unknown         2010      Active

 

                ANXIETY STATE NOS ICD-9: 300.00   2010      Active

 

                DEPRESSIVE DISORDER NEC ICD-9: 311      2010      Active







Medications





          Medication Codes     Instructions Start Date Stop Date Status    Fill 

Instructions

 

           simvastatin 40 mg tablet RxNorm: 340104 1 Tablet(s) PO QD 2019 

02/15/2020 

Active                                   

 

                omeprazole 40 mg capsule,delayed release RxNorm: 2003 Capsu

le(s) Oral QD 

2019          Active               

 

                Xanax 0.25 mg tablet RxNorm: 247846  TAKE 1 TABLET BY MOUTH TWIC

E DAILY 

10/29/2019          No Stop Date        Active               

 

                omeprazole 40 mg capsule,delayed release RxNorm: 2003 Capsu

le(s) PO QD 

10/07/2019          2019          Inactive             

 

             metoprolol tartrate 25 mg tablet RxNorm: 084375 1 Tablet(s) PO BID 

2019                Active                     

 

                omeprazole 40 mg capsule,delayed release RxNorm: 910217   Capsu

le(s) PO QD 

2019          10/06/2019          Inactive             

 

                    Flonase Allergy Relief 50 mcg/actuation nasal spray,suspensi

on RxNorm: 9949682     2

[place]HS 2019      No Stop Date    Active           

 

           simvastatin 40 mg tablet RxNorm: 456281 1 Tablet(s) PO QD 2019 

Inactive                                 

 

           simvastatin 40 mg tablet RxNorm: 152606 1 Tablet(s) PO QD 2019 

Inactive                                 

 

                omeprazole 40 mg capsule,delayed release RxNorm: 089085   Capsu

le(s) PO QD 

2019          Inactive             

 

           simvastatin 40 mg tablet RxNorm: 865175 1 Tablet(s) PO QD 2019 

Inactive                                 

 

                omeprazole 40 mg capsule,delayed release RxNorm: 367062  1 Capsu

le(s) PO QD 

2019          Inactive             

 

             Bactrim  mg-160 mg tablet RxNorm: 567137 1 Tablet(s) PO BID 0

3/08/2019   

2019                Inactive                   

 

             Bactrim  mg-160 mg tablet RxNorm: 793550 1 Tablet(s) PO BID 0

3/08/2019   

2019                Inactive                   

 

             Macrobid 100 mg capsule RxNorm: 111552 1 Capsule(s) PO BID 20

                          Inactive                   

 

             metoprolol tartrate 25 mg tablet RxNorm: 899142 1 Tablet(s) PO BID 

2019                Inactive                   

 

                Xanax 0.25 mg tablet RxNorm: 617767  TAKE 1 TABLET BY MOUTH TWIC

E DAILY 

2018          10/28/2019          Inactive             

 

           Xanax 0.25 mg tablet RxNorm: 320909 1 Tablet(s) PO BID 2018 

Inactive                                 

 

                    Protonix 40 mg tablet,delayed release RxNorm: 591174      1 

Tablet(s) PO BID for 

stomach--replaces omeprazole 2018      Inactive         

 

             Carafate 1 gram tablet RxNorm: 472248 1 Tablet(s) PO AC & HS 2019                Inactive                   

 

           Xanax 0.25 mg tablet RxNorm: 681737 1 Tablet(s) PO BID 10/30/2018 12/

10/2018 

Inactive                                 

 

             Bactrim  mg-160 mg tablet RxNorm: 400908 1 Tablet(s) PO BID 1

   

10/08/2018                Inactive                   

 

             Bactrim  mg-160 mg tablet RxNorm: 673875 1 Tablet(s) PO BID 1

   

10/03/2018                Inactive                   

 

             Carafate 1 gram tablet RxNorm: 709485 1 Tablet(s) PO AC & HS 09/18/

2018   

10/17/2018                Inactive                   

 

                    Protonix 40 mg tablet,delayed release RxNorm: 766523      1 

Tablet(s) PO BID for 

stomach--replaces omeprazole 2018      Inactive         

 

                    Protonix 40 mg tablet,delayed release RxNorm: 572046      1 

Tablet(s) PO BID for 

stomach--replaces omeprazole 2018      Inactive         

 

                    fluticasone 50 mcg/actuation nasal spray,suspension RxNorm: 

5113214     2 Spray 

NASAL QD to each nostril 2018      Inactive         

 

             Nasonex 50 mcg/actuation Spray RxNorm: 3056705 2 [place] [facility] 2018                Inactive                   

 

                omeprazole 40 mg capsule,delayed release RxNorm: 800196  1 Capsu

le(s) PO QD 

2018          08/15/2018          Inactive             

 

                    simvastatin 40 mg tablet RxNorm: 892409      1 Tablet(s) PO 

QD TAKE 1 TABLET EVERY 

DAY             2018      Inactive         

 

             metoprolol tartrate 25 mg tablet RxNorm: 677206 1/2 Tablet(s) PO QD

 2018                Inactive                   

 

                simvastatin 40 mg tablet RxNorm: 108043  Tablet(s) TAKE 1 TABLET

 EVERY DAY 

2017          Inactive             

 

             metoprolol tartrate 25 mg tablet RxNorm: 083849 1/2 Tablet(s) PO QD

 2017                Inactive                   

 

                    Flonase 50 mcg/actuation nasal spray,suspension RxNorm: 1797

933     2 [place] [facility] 

BID             2017      Inactive         

 

                omeprazole 40 mg capsule,delayed release RxNorm: 739764  1 Capsu

le(s) PO QD 

2017          Inactive             

 

             metoprolol tartrate 25 mg tablet RxNorm: 953709 1/2 Tablet(s) PO QD

 04/10/2017   

2017                Inactive                   

 

                    ciprofloxacin 0.2 % ear drops in a dropperette RxNorm: 61301

6      4 Drop(s) OTIC TID

for 1 week      2016      Inactive         

 

           cefdinir 300 mg capsule RxNorm: 179891 2 Capsule(s) PO QD 2016 

Inactive                                 

 

                    omeprazole 40 mg capsule,delayed release RxNorm: 321614     

 TAKE 1 CAPSULE EVERY DAY

                2016      Inactive         

 

             simvastatin 40 mg tablet RxNorm: 666023 TAKE 1 TABLET EVERY DAY 2017                Inactive                   

 

                    Flonase 50 mcg/actuation nasal spray,suspension RxNorm: 1797

933     2 [place] [facility] 

BID             2015      Inactive         

 

             cefuroxime axetil 250 mg tablet RxNorm: 372232 1 Tablet(s) PO BID 0

2015                Inactive                   

 

             cefuroxime axetil 250 mg tablet RxNorm: 102072 1 Tablet(s) PO BID 0

2015                Inactive                   

 

                omeprazole 40 mg capsule,delayed release RxNorm: 095085  1 Capsu

le(s) PO QD 

2015          Inactive             

 

           meloxicam 7.5 mg tablet RxNorm: 269438 1 Tablet(s) PO QD 2015 0

2015 

Inactive                                 

 

                loratadine 10 mg tablet RxNorm: 296379  1 Tablet(s) PO QAM for a

llergies 

2014          Inactive             

 

                    Flonase 50 mcg/actuation nasal spray,suspension RxNorm: 8963

23      2 [place] [facility] BID

                2014      12/15/2015      Inactive         

 

           prednisone 20 mg tablet RxNorm: 884872 2 Tablet(s) PO BID 2014 

Inactive                                 

 

             Dyazide 37.5 mg-25 mg capsule RxNorm: 595711 1 Capsule(s) PO QAM 2014                Inactive                   

 

           Ceftin 500 mg tablet RxNorm: 187365 1 Tablet(s) PO BID 2014 

Inactive                                 

 

           Ceftin 500 mg tablet RxNorm: 291939 1 Tablet(s) PO BID 2014 

Inactive                                 

 

                    simvastatin 40 mg tablet RxNorm: 228138      Tablet(s) PO TA

KE ONE TABLET BY MOUTH 

EVERY DAY       2014      Inactive         

 

                    metoprolol tartrate 25 mg tablet RxNorm: 372439      Tablet(

s) PO TAKE ONE-HALF 

TABLET BY MOUTH EVERY DAY 2014      Inactive         

 

           Ceftin 500 mg tablet RxNorm: 544567 1 Tablet(s) PO BID 10/15/2013 10/

 

Inactive                                 

 

                loratadine 10 mg tablet RxNorm: 896747  1 Tablet(s) PO QAM for a

llergies 

2013          Inactive             

 

                    omeprazole 20 mg capsule,delayed release RxNorm: 612318     

 Capsule(s) PO TAKE ONE 

CAPSULE BY MOUTH TWICE DAILY 2013      Inactive         

 

                omeprazole 20 mg capsule,delayed release RxNorm: 541097  1 Capsu

le(s) PO BID 

2013          Inactive             

 

             Augmentin 875 mg-125 mg tablet RxNorm: 989799 1 Tablet(s) PO Q12H 0

5/10/2013   

2013                Inactive                   

 

             Floxin Otic Drops 1 bottle Drops RxNorm:      5 Drop(s) OTIC BID 05

/10/2013   

2013                Inactive                   

 

                    metoprolol tartrate 25 mg tablet RxNorm: 402247      Tablet(

s) PO TAKE ONE-HALF 

TABLET BY MOUTH EVERY DAY 2013      Inactive         

 

                    simvastatin 40 mg tablet RxNorm: 395873      Tablet(s) PO TA

KE ONE TABLET BY MOUTH 

EVERY DAY       2013      Inactive         

 

                lancets         RxNorm:         Misc Miscellaneous USE ONE TO CH

YOGI GLUCOSE EVERY DAY 

2013          Inactive             

 

             Carafate 1 gram tablet RxNorm: 793274 1 Tablet(s) PO AC & HS 2015                Inactive                   

 

           Flagyl 500 mg tablet RxNorm: 221865 1 Tablet(s) PO TID 10/10/2012 10/

 

Inactive                                 

 

             Carafate 1 gram tablet RxNorm: 186169 1 Tablet(s) PO AC & HS 10/10/

2012   

2012                Inactive                   

 

          One Touch Test strips RxNorm:   Miscellaneous QD 2012

 Inactive  

one touch ultra mini test strips

 

           Protonix 40 mg Tab RxNorm: 542097 1 Tablet(s) PO QD 2012 

Inactive                                 

 

           Protonix 40 mg Tab RxNorm: 750873 1 Tablet(s) PO QD 2012 10/09/

2012 

Inactive                                 

 

           prednisone 20 mg Tab RxNorm: 061469 1 Tablet(s) PO BID 2012 

Inactive                                 

 

             Flexeril 5 mg Tab RxNorm: 602979 1 Tablet(s) PO QHS for spasm 2012                Inactive                   

 

             Flexeril 5 mg Tab RxNorm: 308159 1 Tablet(s) PO QHS for spasm 2012                Inactive                   

 

                amlodipine 2.5 mg Tab RxNorm: 479619  1/2 Tablet(s) PO QD replac

es 5mg dose 

2012          Inactive             

 

           simvastatin 40 mg tablet RxNorm: 712366 1 Tablet(s) PO QD 2012 

Inactive                                 

 

             metoprolol tartrate 25 mg tablet RxNorm: 310369 1/2 Tablet(s) PO QD

 2012                Inactive                   

 

                omeprazole 20 mg capsule,delayed release RxNorm: 116994  1 Capsu

le(s) PO BID 

2012          Inactive             

 

           cefdinir 300 mg Cap RxNorm: 927384 2 Capsule(s) PO QD 2011 

Inactive                                 

 

           simvastatin 40 mg Tab RxNorm: 193678 1 Tablet(s) PO QD 2011 

Inactive                                 

 

             metoprolol tartrate 25 mg Tab RxNorm: 466830 1/2 Tablet(s) PO QD 10

/   

2012                Inactive                   

 

             metoprolol tartrate 25 mg Tab RxNorm: 596728 1/2 Tablet(s) PO QD 10

/   

10/24/2011                Inactive                   

 

           meclizine 25 mg Tab RxNorm: 5411445 1 Tablet(s) PO QHS 2011 

Inactive                                for dizziness

 

           Ceftin 500 mg Tab RxNorm: 320173 1 Tablet(s) PO BID 2011 

Inactive                                 

 

                omeprazole 20 mg Cap, Delayed Release RxNorm: 893142  1 Capsule(

s) PO BID 

2011          10/24/2011          Inactive             

 

           simvastatin 40 mg Tab RxNorm: 197408 1 Tablet(s) PO QD 2011 

Inactive                                 

 

           simvastatin 40 mg Tab RxNorm: 071576 1 Tablet(s) PO QD 2011 

Inactive                                 

 

           simvastatin 40 mg Tab RxNorm: 780797 1 Tablet(s) PO QD 2010 

Inactive                                 

 

             Tessalon Perles 100 mg Cap RxNorm: 327284 1 Capsule(s) PO Q6-8H 2010                Inactive                   

 

           cefdinir 300 mg Cap RxNorm: 584320 1 Capsule(s) PO BID 2010 

Inactive                                 

 

           Lexapro 10 mg Tab RxNorm: 576109 1 Tablet(s) PO QD 2010

010 

Inactive                                 

 

           Cipro 250 mg Tab RxNorm: 968449 1 Tablet(s) PO BID 2010 10/04/2

010 

Inactive                                 

 

             Wellbutrin  mg 24 hr Tab RxNorm: 989668 1 Tablet(s) PO QAM 2010                Inactive                   

 

          Fish Oil 1,000 mg Cap RxNorm:   1 Capsule(s) PO QD No Start Date      

     Active     

 

                Tylenol Extra Strength 500 mg tablet RxNorm: 496363  1/2 Tablet(

s) PO as needed 

No Start Date                           Active               

 

                nitroglycerin 0.4 mg sublingual tablet RxNorm: 780450  Tablet(s)

 SL as needed No 

Start Date                              Active               

 

                    Calcium with Vitamin D 600 mg (1,500 mg)-400 unit tablet RxN

orm: 774583      1 

Tablet(s) PO QD No Start Date                   Active           

 

           Multivitamin & Mineral Formula Tab RxNorm:    1 Tablet(s) PO QD No St

art Date            

Active                                   

 

          vitamin B complex capsule RxNorm:   1 Capsule(s) PO QD No Start Date  

         Active     

 

          Aspirin 81 mg Tab RxNorm: 625290 1 Tablet(s) PO QD No Start Date      

     Active     

 

                    isosorbide mononitrate ER 30 mg tablet,extended release 24 h

r RxNorm: 563356      1 

Tablet(s) PO QHS No Start Date                   Active           

 

           Vitamin D 1,000 unit Cap RxNorm: 446116 1 Capsule(s) PO QD No Start D

ate            

Active                                   

 

          Co Q-10 oral RxNorm: 59549 oral      No Start Date           Active   

  

 

             metoprolol tartrate 25 mg Tab RxNorm: 158716 1/2 Tablet(s) PO QD No

 Start Date 

10/23/2011                Inactive                   

 

             Nasonex 50 mcg/actuation Spray RxNorm: 8563151 2 Spray NASAL No Sta

rt Date 

2018                Inactive                   

 

           Vitamin B12 1000mcg Tablet RxNorm:    1 Tablet(s) PO QD No Start Date

 2015 

Inactive                                 

 

           amlodipine 5 mg Tab RxNorm: 573870 1 Tablet(s) PO QD No Start Date  

Inactive                                 

 

             simvastatin 40 mg tablet RxNorm: 700222 1 Tablet(s) PO QD No Start 

Date 

2019                Inactive                   

 

                omeprazole 20 mg capsule,delayed release RxNorm: 136483  1 Capsu

le(s) PO BID No 

Start Date          2013          Inactive             

 

                omeprazole 40 mg capsule,delayed release RxNorm: 555423  1 Capsu

le(s) PO QD No 

Start Date          2019          Inactive             

 

           Nexium 40 mg Cap RxNorm: 691133 1 Capsule(s) PO QD No Start Date  

Inactive                                 

 

             Stool Softener 100 mg Tab RxNorm: 0220305 2 Tablet(s) PO BID No Sta

rt Date 

2012                Inactive                   

 

                    Calcium with Vitamin D 600 mg (1,500 mg)-400 unit Tab RxNorm

: 861638      1 Tablet(s)

PO QD           No Start Date   2015      Inactive         

 

             iron 325 mg (65 mg iron) Tab RxNorm: 936749 1 Tablet(s) PO QD No St

art Date 

2015                Inactive                   

 

                Flonase 50 mcg/actuation Nasal Spray RxNorm: 977077  2 [place] ROZ

AL BID No Start 

Date                2014          Inactive             

 

                    fluticasone 50 mcg/actuation nasal spray,suspension RxNorm: 

3731710     2 Spray 

NASAL QD to each nostril No Start Date   2018      Inactive         

 

             Nasonex 50 mcg/actuation Spray RxNorm: 2617509 2 Spray NASAL QD No 

Start Date 

2018                Inactive                   

 

                    hydralazine 50 mg tablet RxNorm: 963502      1 Tablet(s) PO 

as needed for BP over 

160/90          No Start Date   2018      Inactive         

 

             Vimovo 500 mg-20 mg 12 hr Tab RxNorm: 709505 1 Tablet(s) PO BID No 

Start Date 

2011                Inactive                   

 

             Tylenol PM 25 mg-500 mg/15 mL Oral Soln RxNorm: 5108259 1 PO QPM   

  No Start Date 

2015                Inactive                   

 

          Iron (Ferrous Sulfate) Oral RxNorm:   Oral      No Start Date 20

12 Inactive   

 

             Reglan 10 mg Tab RxNorm: 233673 1 Tablet(s) PO TID before meals No 

Start Date 

2011                Inactive                   

 

           Xanax 1 mg Tab RxNorm: 585837 1/2 Tablet(s) PO QD No Start Date  

Inactive                                 

 

             amlodipine 10 mg tablet RxNorm: 498474 1 Tablet(s) PO QD No Start D

ate 

2014                Inactive                   

 

          Iron (dried) Oral RxNorm:   Oral      No Start Date 2012 Inactiv

e   

 

                metoprolol tartrate 50 mg tablet RxNorm: 757518  1 Tablet(s) PO 

BID No Start Date

                    12/10/2018          Inactive             

 

           Xanax 0.25 mg tablet RxNorm: 679715 1 Tablet(s) PO BID No Start Date 

10/29/2018 

Inactive                                 

 

                lancets         RxNorm:         Miscellaneous check blood sugar 

at least once daily No Start 

Date                2013          Inactive             

 

           simvastatin 40 mg Tab RxNorm: 220960 1 Tablet(s) PO QD No Start Date 

2010 

Inactive                                 

 

                    isosorbide mononitrate ER 30 mg tablet,extended release 24 h

r RxNorm: 314854      1 

Tablet(s) PO QHS No Start Date   2019      Inactive         

 

             amlodipine 2.5 mg tablet RxNorm: 534771 1 Tablet(s) PO QD No Start 

Date 

2014                Inactive                   

 

             sucralfate 1 gram tablet RxNorm: 259421 1 Tablet(s) PO QID No Start

 Date 

2019                Inactive                   

 

          Fish Oil Oral RxNorm:   Oral      No Start Date 2012 Inactive   

 

          Multiple Vitamin Oral RxNorm:   Oral      No Start Date 2012 [place]

ctive   

 

                metoprolol tartrate 25 mg tablet RxNorm: 207985  1/2 Tablet(s) P

O QD No Start 

Date                2017          Inactive             

 

                metoprolol tartrate 50 mg tablet RxNorm: 206147  1/2 Tablet(s) P

O BID No Start 

Date                2019          Inactive             

 

                    Flonase Allergy Relief 50 mcg/actuation nasal spray,suspensi

on RxNorm: 0445793     2

[place] NASAL QHS No Start Date   2019      Inactive         







Medication Administered

No Medication Administered data



Immunizations





                Vaccine         Codes           Date            Status

 

                Influenza       CVX: 135        10/22/2019      Complete

 

                Influenza       CVX: 135        10/22/2019      Complete

 

                Influenza       CVX: 135        2018      Complete

 

                Influenza       CVX: 135        10/06/2017      Complete

 

                Influenza       CVX: 135        10/07/2016      Complete

 

                Influenza       CVX: 135        10/16/2015       

 

                Influenza       CVX: 141        2013       

 

                Influenza (Adult) CVX: 141        10/06/2010       







Results





          Observation Observation Code Item      Item Code Result    Date      S

ervice Location

 

          GFR CALC  6596969   GFR Non Afr Amr           52 mL/min 10/11/2019 Unk

nown

 

          GFR CALC  6257447   GFR Afr Amr           >60 mL/min 10/11/2019 Unknow

n

 

          COMPREHENSIVE METABOLIC 15865     AST                 17 U/L    10/11/

2019 Unknown

 

          COMPREHENSIVE METABOLIC 34997     ALT                 11 U/L    10/11/

2019 Unknown

 

          COMPREHENSIVE METABOLIC 87675     BUN                 17 mg/dL  10/11/

2019 Unknown

 

          COMPREHENSIVE METABOLIC 24267     ALBUMIN             3.9 g/dL  10/11/

2019 Unknown

 

          COMPREHENSIVE METABOLIC 20789     CHLORIDE            108 mmol/L 10/11

/2019 Unknown

 

          COMPREHENSIVE METABOLIC 10099     Bili Total           0.5 mg/dL 10/11

/2019 Unknown

 

          COMPREHENSIVE METABOLIC 45035     ALK PHOS            72 U/L    10/11/

2019 Unknown

 

          COMPREHENSIVE METABOLIC 02077     SODIUM              142 mmol/L 10/11

/2019 Unknown

 

          COMPREHENSIVE METABOLIC 77942     CREATININE           1.02 mg/dL 10/1

2019 Unknown

 

          COMPREHENSIVE METABOLIC 22134     CALCIUM             9.3 mg/dL 10/11/

2019 Unknown

 

          COMPREHENSIVE METABOLIC 48217     POTASSIUM           4.0 mmol/L 10/11

/2019 Unknown

 

          COMPREHENSIVE METABOLIC 97985     Total Protein           6.2 g/dL  10

/2019 Unknown

 

          COMPREHENSIVE METABOLIC 79778     Glucose             93 mg/dL  10/11/

2019 Unknown

 

          COMPREHENSIVE METABOLIC 59605     Bicarbonate           27 mmol/L 10/1

2019 Unknown

 

          COMPREHENSIVE METABOLIC 20398     AGAP                7 mmol/L  10/11/

2019 Unknown

 

          GFR CALC  5205858   GFR Non Afr Amr           48 mL/min 06/10/2019 Unk

nown

 

          GFR CALC  0717044   GFR Afr Amr           59 mL/min 06/10/2019 Unknown

 

          THYROID STIMULATING HORMONE 49108     TSH                 1.936 uIU/mL

 06/10/2019 Unknown

 

          COMPREHENSIVE METABOLIC 92481     AST                 18 U/L    06/10/

2019 Unknown

 

          COMPREHENSIVE METABOLIC 56975     ALT                 11 U/L    06/10/

2019 Unknown

 

          COMPREHENSIVE METABOLIC 29413     BUN                 18 mg/dL  06/10/

2019 Unknown

 

          COMPREHENSIVE METABOLIC 65300     ALBUMIN             4.2 g/dL  06/10/

2019 Unknown

 

          COMPREHENSIVE METABOLIC 90923     CHLORIDE            109 mmol/L 06/10

/2019 Unknown

 

          COMPREHENSIVE METABOLIC 66366     Bili Total           0.4 mg/dL 06/10

/2019 Unknown

 

          COMPREHENSIVE METABOLIC 06186     ALK PHOS            64 U/L    06/10/

2019 Unknown

 

          COMPREHENSIVE METABOLIC 44952     SODIUM              142 mmol/L 06/10

/2019 Unknown

 

          COMPREHENSIVE METABOLIC 08107     CREATININE           1.09 mg/dL  Unknown

 

          COMPREHENSIVE METABOLIC 07877     CALCIUM             9.3 mg/dL 06/10/

2019 Unknown

 

          COMPREHENSIVE METABOLIC 91449     POTASSIUM           4.7 mmol/L 06/10

/2019 Unknown

 

          COMPREHENSIVE METABOLIC 45979     Total Protein           6.3 g/dL  06

/10/2019 Unknown

 

          COMPREHENSIVE METABOLIC 16674     Glucose             84 mg/dL  06/10/

2019 Unknown

 

          COMPREHENSIVE METABOLIC 85151     Bicarbonate           25 mmol/L  Unknown

 

          COMPREHENSIVE METABOLIC 45617     AGAP                8 mmol/L  06/10/

2019 Unknown

 

          COMPLETE BLOOD COUNT [id number]   WBC                 7.8 10e9/L 06/10/20

19 Unknown

 

          COMPLETE BLOOD COUNT [id number]   RBC                 4.04 10e12/L 06/10/

2019 Unknown

 

          COMPLETE BLOOD COUNT [id number]   HEMOGLOBIN           12.2 g/dL 06/10/20

19 Unknown

 

          COMPLETE BLOOD COUNT [id number]   HEMATOCRIT           38.6 %    06/10/20

19 Unknown

 

          COMPLETE BLOOD COUNT [id number]   MCV                 95.5 fL   06/10/201

9 Unknown

 

          COMPLETE BLOOD COUNT [id number]   MCH                 30.2 pg   06/10/201

9 Unknown

 

          COMPLETE BLOOD COUNT [id number]   MCHC                31.6 g/dL 06/10/201

9 Unknown

 

          COMPLETE BLOOD COUNT [id number]   PLATELET COUNT           215 10e9/L 06/

10/2019 Unknown

 

          COMPLETE BLOOD COUNT [id number]   Mean Plt Volume           11.2 fL   06/

10/2019 Unknown

 

          COMPLETE BLOOD COUNT [id number]   Neut Auto           45.7 %    06/10/201

9 Unknown

 

          COMPLETE BLOOD COUNT [id number]   Lymph Auto           42.1 %    06/10/20

19 Unknown

 

          COMPLETE BLOOD COUNT [id number]   Mono Auto           9.2 %     06/10/201

9 Unknown

 

          COMPLETE BLOOD COUNT [id number]   RDW                 13.4 %    06/10/201

9 Unknown

 

          COMPLETE BLOOD COUNT [id number]   Eos Auto            2.7 %     06/10/201

9 Unknown

 

          COMPLETE BLOOD COUNT [id number]   Baso Auto           0.3 %     06/10/201

9 Unknown

 

          COMPLETE BLOOD COUNT [id number]   Neutrophil Abs           3.56 10e9/L 06

/10/2019 Unknown

 

          COMPLETE BLOOD COUNT [id number]   Lymphocyte Abs           3.28 10e9/L 06

/10/2019 Unknown

 

          COMPLETE BLOOD COUNT [id number]   Monocyte Abs           0.72 10e9/L  Unknown

 

          COMPLETE BLOOD COUNT [id number]   Eosinophil Abs           0.21 10e9/L 06

/10/2019 Unknown

 

          COMPLETE BLOOD COUNT [id number]   RDW-SD              44.9 fL   06/10/201

9 Unknown

 

          COMPLETE BLOOD COUNT [id number]   Basophil Abs           0.02 10e9/L  Unknown

 

          COMPLETE BLOOD COUNT [id number]   WBC                 5.2 10e9/L 20

18 Unknown

 

          COMPLETE BLOOD COUNT [id number]   RBC                 4.21 10e12/L 2018 Unknown

 

          COMPLETE BLOOD COUNT [id number]   HEMOGLOBIN           12.8 g/dL 20

18 Unknown

 

          COMPLETE BLOOD COUNT [id number]   HEMATOCRIT           39.0 %    20

18 Unknown

 

          COMPLETE BLOOD COUNT [id number]   MCV                 92.6 fL   

8 Unknown

 

          COMPLETE BLOOD COUNT [id number]   MCH                 30.4 pg   

8 Unknown

 

          COMPLETE BLOOD COUNT [id number]   MCHC                32.8 g/dL 

8 Unknown

 

          COMPLETE BLOOD COUNT [id number]   PLATELET COUNT           202 10e9/L  Unknown

 

          COMPLETE BLOOD COUNT [id number]   Mean Plt Volume           10.7 fL    Unknown

 

          COMPLETE BLOOD COUNT [id number]   Neut Auto           40.9 %    

8 Unknown

 

          COMPLETE BLOOD COUNT [id number]   Lymph Auto           46.3 %    20

18 Unknown

 

          COMPLETE BLOOD COUNT [id number]   Mono Auto           9.3 %     

8 Unknown

 

          COMPLETE BLOOD COUNT [id number]   RDW                 13.7 %    

8 Unknown

 

          COMPLETE BLOOD COUNT [id number]   Eos Auto            2.9 %     

8 Unknown

 

          COMPLETE BLOOD COUNT [id number]   Baso Auto           0.6 %     

8 Unknown

 

          COMPLETE BLOOD COUNT [id number]   Neutrophil Abs           2.13 10e9/L  Unknown

 

          COMPLETE BLOOD COUNT [id number]   Lymphocyte Abs           2.41 10e9/L  Unknown

 

          COMPLETE BLOOD COUNT [id number]   Monocyte Abs           0.48 10e9/L  Unknown

 

          COMPLETE BLOOD COUNT [id number]   Eosinophil Abs           0.15 10e9/L  Unknown

 

          COMPLETE BLOOD COUNT [id number]   RDW-SD              45.2 fL   

8 Unknown

 

          COMPLETE BLOOD COUNT [id number]   Basophil Abs           0.03 10e9/L  Unknown

 

          [place] [address]     Glucose             118 mg/dL  Unknown

 

          [place] [address]     CREATININE           1.01 mg/dL  Unknown

 

          [place] [address]     BUN                 14 mg/dL   Unknown

 

          [place] [address]     SODIUM              141 mmol/L  Unknown

 

          [place] [address]     POTASSIUM           4.0 mmol/L  Unknown

 

          [place] [address]     CHLORIDE            108 mmol/L  Unknown

 

          [place] [address]     Bicarbonate           25 mmol/L  Unknown

 

          [place] [address]     AGAP                8 mmol/L   Unknown

 

          [place] [address]     CALCIUM             9.6 mg/dL  Unknown

 

          FREE T4   86944     T4 Free             1.23 ng/dL 2018 Unknown

 

          GFR CALC  6478889   GFR Non Afr Amr           53 mL/min 2018 Unk

nown

 

          GFR CALC  5520685   GFR Afr Amr           >60 mL/min 2018 Unknow

n

 

          THYROID STIMULATING HORMONE 77761     TSH                 2.124 uIU/mL

 2018 Unknown

 

          LIPID GROUP 94444     Cholesterol           152 mg/dL 2018 Unkno

wn

 

          LIPID GROUP 44113     Triglyceride           151 mg/dL 2018 Unkn

own

 

          LIPID GROUP 07354     HDL CHOLESTEROL           47 mg/dL  2018 U

nknown

 

          LIPID GROUP 20562     Chol/HDL Ratio           3.23 ratio 2018 U

nknown

 

          LIPID GROUP 95971     NON-HDL Chol           105 mg/dL 2018 Unkn

own

 

          LIPID GROUP 33960     LDL Cholesterol           75 mg/dL  2018 U

nknown

 

          ASSAY OF TROPONIN QUANT 82891     Troponin-I           <0.30 ng/mL  Unknown

 

          COMPREHENSIVE METABOLIC 84878     AST                 20 U/L    2018 Unknown

 

          COMPREHENSIVE METABOLIC 28683     ALT                 14 U/L    2018 Unknown

 

          COMPREHENSIVE METABOLIC 15047     BUN                 19 mg/dL  2018 Unknown

 

          COMPREHENSIVE METABOLIC 15559     ALBUMIN             4.2 g/dL  2018 Unknown

 

          COMPREHENSIVE METABOLIC 97894     CHLORIDE            102 mmol/L  Unknown

 

          COMPREHENSIVE METABOLIC 01577     Bili Total           0.4 mg/dL  Unknown

 

          COMPREHENSIVE METABOLIC 53900     ALK PHOS            66 U/L    2018 Unknown

 

          COMPREHENSIVE METABOLIC 99180     SODIUM              135 mmol/L  Unknown

 

          COMPREHENSIVE METABOLIC 05167     CREATININE           1.01 mg/dL  Unknown

 

          COMPREHENSIVE METABOLIC 76116     CALCIUM             9.3 mg/dL 2018 Unknown

 

          COMPREHENSIVE METABOLIC 66930     POTASSIUM           4.8 mmol/L  Unknown

 

          COMPREHENSIVE METABOLIC 53893     Total Protein           7.0 g/dL   Unknown

 

          COMPREHENSIVE METABOLIC 49832     Glucose             91 mg/dL  2018 Unknown

 

          COMPREHENSIVE METABOLIC 92886     Bicarbonate           23 mmol/L  Unknown

 

          COMPREHENSIVE METABOLIC 52475     AGAP                10 mmol/L 2018 Unknown

 

          COMPLETE BLOOD COUNT [id number]   WBC                 7.5 10e9/L 20

18 Unknown

 

          COMPLETE BLOOD COUNT [id number]   RBC                 4.13 10e12/L 2018 Unknown

 

          COMPLETE BLOOD COUNT [id number]   HEMOGLOBIN           12.6 g/dL 20

18 Unknown

 

          COMPLETE BLOOD COUNT [id number]   HEMATOCRIT           38.4 %    20

18 Unknown

 

          COMPLETE BLOOD COUNT [id number]   MCV                 93.0 fL   

8 Unknown

 

          COMPLETE BLOOD COUNT [id number]   MCH                 30.5 pg   

8 Unknown

 

          COMPLETE BLOOD COUNT [id number]   MCHC                32.8 g/dL 

8 Unknown

 

          COMPLETE BLOOD COUNT [id number]   PLATELET COUNT           204 10e9/L  Unknown

 

          COMPLETE BLOOD COUNT [id number]   Mean Plt Volume           10.9 fL    Unknown

 

          COMPLETE BLOOD COUNT [id number]   Neut Auto           43.1 %    

8 Unknown

 

          COMPLETE BLOOD COUNT [id number]   Lymph Auto           45.0 %    20

18 Unknown

 

          COMPLETE BLOOD COUNT [id number]   Mono Auto           9.2 %     

8 Unknown

 

          COMPLETE BLOOD COUNT [id number]   RDW                 13.4 %    

8 Unknown

 

          COMPLETE BLOOD COUNT [id number]   Eos Auto            2.3 %     

8 Unknown

 

          COMPLETE BLOOD COUNT [id number]   Baso Auto           0.4 %     

8 Unknown

 

          COMPLETE BLOOD COUNT [id number]   Neutrophil Abs           3.23 10e9/L  Unknown

 

          COMPLETE BLOOD COUNT [id number]   Lymphocyte Abs           3.38 10e9/L  Unknown

 

          COMPLETE BLOOD COUNT [id number]   Monocyte Abs           0.69 10e9/L  Unknown

 

          COMPLETE BLOOD COUNT [id number]   Eosinophil Abs           0.17 10e9/L  Unknown

 

          COMPLETE BLOOD COUNT [id number]   RDW-SD              44.4 fL   

8 Unknown

 

          COMPLETE BLOOD COUNT [id number]   Basophil Abs           0.03 10e9/L  Unknown

 

          GFR CALC  8335401   GFR Non Afr Amr           53 mL/min 2018 Unk

nown

 

          GFR CALC  1675646   GFR Afr Amr           >60 mL/min 2018 Unknow

n

 

          GLYCOSYLATED HEMOGLOBIN TEST 44775     Hgb A1c   97072-0   5.4 %     0

2018 Unknown

 

          MEAN GLUC 6598520   Calc Mean Gluc           108 mg/dL 2018 Unkn

own

 

          MEAN GLUC 7261228   Calc Mean Gluc           114 mg/dL 2017 Unkn

own

 

          LIPID GROUP 62225     Cholesterol           146 mg/dL 2017 Unkno

wn

 

          LIPID GROUP 73727     Triglyceride           119 mg/dL 2017 Unkn

own

 

          LIPID GROUP 19611     HDL CHOLESTEROL           47 mg/dL  2017 U

nknown

 

          LIPID GROUP 35089     Chol/HDL Ratio           3.11 ratio 2017 U

nknown

 

          LIPID GROUP 00525     NON-HDL Chol           99 mg/dL  2017 Unkn

own

 

          LIPID GROUP 69853     LDL Cholesterol           75 mg/dL  2017 U

nknown

 

          GLYCOSYLATED HEMOGLOBIN TEST 76966     Hgb A1c   64930-1   5.6 %     0

2017 Unknown

 

          COMPREHENSIVE METABOLIC 22176     AST                 22 U/L    2017 Unknown

 

          COMPREHENSIVE METABOLIC 60930     ALT                 12 U/L    2017 Unknown

 

          COMPREHENSIVE METABOLIC 65154     BUN                 17 mg/dL  2017 Unknown

 

          COMPREHENSIVE METABOLIC 85833     ALBUMIN             4.0 g/dL  2017 Unknown

 

          COMPREHENSIVE METABOLIC 47607     CHLORIDE            110 mmol/L  Unknown

 

          COMPREHENSIVE METABOLIC 11688     Bili Total           0.4 mg/dL  Unknown

 

          COMPREHENSIVE METABOLIC 35392     ALK PHOS            63 U/L    2017 Unknown

 

          COMPREHENSIVE METABOLIC 29371     SODIUM              140 mmol/L  Unknown

 

          COMPREHENSIVE METABOLIC 01704     CREATININE           1.05 mg/dL  Unknown

 

          COMPREHENSIVE METABOLIC 61352     CALCIUM             9.2 mg/dL 2017 Unknown

 

          COMPREHENSIVE METABOLIC 84206     POTASSIUM           4.2 mmol/L  Unknown

 

          COMPREHENSIVE METABOLIC 99370     Total Protein           6.2 g/dL   Unknown

 

          COMPREHENSIVE METABOLIC 31379     Glucose             87 mg/dL  2017 Unknown

 

          COMPREHENSIVE METABOLIC 57957     Bicarbonate           24 mmol/L  Unknown

 

          COMPREHENSIVE METABOLIC 42328     AGAP                6 mmol/L  2017 Unknown

 

          GFR CALC  9728120   GFR Non Afr Amr           51 mL/min 2017 Unk

nown

 

          GFR CALC  3883559   GFR Afr Amr           >60 mL/min 2017 Unknow

n

 

          COMPLETE BLOOD COUNT [id number]   WBC                 6.7 10e9/L 20

17 Unknown

 

          COMPLETE BLOOD COUNT [id number]   RBC                 4.04 10e12/L 2017 Unknown

 

          COMPLETE BLOOD COUNT [id number]   HEMOGLOBIN           12.1 g/dL 20

17 Unknown

 

          COMPLETE BLOOD COUNT [id number]   HEMATOCRIT           38.0 %    20

17 Unknown

 

          COMPLETE BLOOD COUNT [id number]   MCV                 94.1 fL   

7 Unknown

 

          COMPLETE BLOOD COUNT [id number]   MCH                 30.0 pg   

7 Unknown

 

          COMPLETE BLOOD COUNT [id number]   MCHC                31.8 g/dL 

7 Unknown

 

          COMPLETE BLOOD COUNT [id number]   PLATELET COUNT           206 10e9/L  Unknown

 

          COMPLETE BLOOD COUNT [id number]   Mean Plt Volume           11.3 fL    Unknown

 

          COMPLETE BLOOD COUNT [id number]   Neut Auto           35.8 %    

7 Unknown

 

          COMPLETE BLOOD COUNT [id number]   Lymph Auto           51.6 %    20

17 Unknown

 

          COMPLETE BLOOD COUNT [id number]   Mono Auto           8.8 %     

7 Unknown

 

          COMPLETE BLOOD COUNT [id number]   RDW                 13.5 %    

7 Unknown

 

          COMPLETE BLOOD COUNT [id number]   Eos Auto            3.4 %     

7 Unknown

 

          COMPLETE BLOOD COUNT [id number]   Baso Auto           0.4 %     

7 Unknown

 

          COMPLETE BLOOD COUNT [id number]   Neutrophil Abs           2.40 10e9/L  Unknown

 

          COMPLETE BLOOD COUNT [id number]   Lymphocyte Abs           3.46 10e9/L  Unknown

 

          COMPLETE BLOOD COUNT [id number]   Monocyte Abs           0.59 10e9/L  Unknown

 

          COMPLETE BLOOD COUNT [id number]   Eosinophil Abs           0.23 10e9/L  Unknown

 

          COMPLETE BLOOD COUNT [id number]   RDW-SD              45.3 fL   

7 Unknown

 

          COMPLETE BLOOD COUNT [id number]   Basophil Abs           0.03 10e9/L  Unknown

 

          THYROID STIMULATING HORMONE 42648     TSH                 1.981 uIU/mL

 2017 Unknown

 

          COMPLETE BLOOD COUNT [id number]   WBC                 6.0 10e9/L 20

17 Unknown

 

          COMPLETE BLOOD COUNT [id number]   RBC                 4.29 10e12/L 2017 Unknown

 

          COMPLETE BLOOD COUNT [id number]   HEMOGLOBIN           12.9 g/dL 20

17 Unknown

 

          COMPLETE BLOOD COUNT [id number]   HEMATOCRIT           38.4 %    20

17 Unknown

 

          COMPLETE BLOOD COUNT [id number]   MCV                 89.5 fL   

7 Unknown

 

          COMPLETE BLOOD COUNT [id number]   MCH                 30.1 pg   

7 Unknown

 

          COMPLETE BLOOD COUNT [id number]   MCHC                33.6 g/dL 

7 Unknown

 

          COMPLETE BLOOD COUNT [id number]   PLATELET COUNT           181 10e9/L 2017 Unknown

 

          COMPLETE BLOOD COUNT [id number]   Mean Plt Volume           11.7 fL   2017 Unknown

 

          COMPLETE BLOOD COUNT [id number]   Neut Auto           36.9 %    

7 Unknown

 

          COMPLETE BLOOD COUNT [id number]   Lymph Auto           50.4 %    20

17 Unknown

 

          COMPLETE BLOOD COUNT [id number]   Mono Auto           9.0 %     

7 Unknown

 

          COMPLETE BLOOD COUNT [id number]   RDW                 13.7 %    

7 Unknown

 

          COMPLETE BLOOD COUNT [id number]   Eos Auto            3.4 %     

7 Unknown

 

          COMPLETE BLOOD COUNT [id number]   Baso Auto           0.3 %     

7 Unknown

 

          COMPLETE BLOOD COUNT [id number]   Neutrophil Abs           2.21 10e9/L  Unknown

 

          COMPLETE BLOOD COUNT [id number]   Lymphocyte Abs           3.02 10e9/L  Unknown

 

          COMPLETE BLOOD COUNT [id number]   Monocyte Abs           0.54 10e9/L 2017 Unknown

 

          COMPLETE BLOOD COUNT [id number]   Eosinophil Abs           0.20 10e9/L  Unknown

 

          COMPLETE BLOOD COUNT [id number]   RDW-SD              44.0 fL   

7 Unknown

 

          COMPLETE BLOOD COUNT [id number]   Basophil Abs           0.02 10e9/L 2017 Unknown

 

          GLYCOSYLATED HEMOGLOBIN TEST 80101     Hgb A1c   97957-9   5.4 %     0

3/09/2017 Unknown

 

          THYROID STIMULATING HORMONE 76525     TSH                 2.200 uIU/mL

 2017 Unknown

 

          GFR CALC  2969523   GFR Non Afr Amr           50 mL/min 2017 Unk

nown

 

          GFR CALC  5603686   GFR Afr Amr           >60 mL/min 2017 Unknow

n

 

          MEAN GLUC [id number]   Calc Mean Gluc           108 mg/dL 2017 Unkn

own

 

          COMPREHENSIVE METABOLIC 57175     AST                 18 U/L    2017 Unknown

 

          COMPREHENSIVE METABOLIC 27854     ALT                 10 U/L    2017 Unknown

 

          COMPREHENSIVE METABOLIC 67457     BUN                 20 mg/dL  2017 Unknown

 

          COMPREHENSIVE METABOLIC 09622     ALBUMIN             4.1 g/dL  2017 Unknown

 

          COMPREHENSIVE METABOLIC 34272     CHLORIDE            109 mmol/L  Unknown

 

          COMPREHENSIVE METABOLIC 96898     Bili Total           0.6 mg/dL  Unknown

 

          COMPREHENSIVE METABOLIC 51243     ALK PHOS            64 U/L    2017 Unknown

 

          COMPREHENSIVE METABOLIC 67272     SODIUM              141 mmol/L  Unknown

 

          COMPREHENSIVE METABOLIC 94755     CREATININE           1.06 mg/dL 2017 Unknown

 

          COMPREHENSIVE METABOLIC 71196     CALCIUM             9.9 mg/dL 2017 Unknown

 

          COMPREHENSIVE METABOLIC 19371     POTASSIUM           4.2 mmol/L  Unknown

 

          COMPREHENSIVE METABOLIC 46008     Total Protein           6.3 g/dL   Unknown

 

          COMPREHENSIVE METABOLIC 49800     Glucose             99 mg/dL  2017 Unknown

 

          COMPREHENSIVE METABOLIC 26555     Bicarbonate           21 mmol/L 2017 Unknown

 

          COMPREHENSIVE METABOLIC 78198     AGAP                11 mmol/L 2017 Unknown

 

          LIPID GROUP [id number]     Cholesterol           169 mg/dL 2016 Unkno

wn

 

          LIPID GROUP [id number]     Triglyceride           165 mg/dL 2016 Unkn

own

 

          LIPID GROUP [id number]     HDL CHOLESTEROL           43 mg/dL  2016 U

nknown

 

          LIPID GROUP 05086     Chol/HDL Ratio           3.93 ratio 2016 U

nknown

 

          LIPID GROUP 21873     NON-HDL Chol           126 mg/dL 2016 Unkn

own

 

          LIPID GROUP 00628     LDL Cholesterol           93 mg/dL  2016 U

nknown

 

          COMPREHENSIVE METABOLIC 13333     AST                 18 U/L    2016 Unknown

 

          COMPREHENSIVE METABOLIC 74865     ALT                 10 U/L    2016 Unknown

 

          COMPREHENSIVE METABOLIC 99270     BUN                 20 mg/dL  2016 Unknown

 

          COMPREHENSIVE METABOLIC 58304     ALBUMIN             3.9 g/dL  2016 Unknown

 

          COMPREHENSIVE METABOLIC 80236     CHLORIDE            110 mmol/L  Unknown

 

          COMPREHENSIVE METABOLIC 58226     Bili Total           0.5 mg/dL  Unknown

 

          COMPREHENSIVE METABOLIC 21541     ALK PHOS            72 U/L    2016 Unknown

 

          COMPREHENSIVE METABOLIC 21672     SODIUM              141 mmol/L  Unknown

 

          COMPREHENSIVE METABOLIC 84122     CREATININE           1.12 mg/dL  Unknown

 

          COMPREHENSIVE METABOLIC 14553     CALCIUM             9.7 mg/dL 2016 Unknown

 

          COMPREHENSIVE METABOLIC 30019     POTASSIUM           4.4 mmol/L  Unknown

 

          COMPREHENSIVE METABOLIC 56427     Total Protein           6.2 g/dL   Unknown

 

          COMPREHENSIVE METABOLIC 93435     Glucose             90 mg/dL  2016 Unknown

 

          COMPREHENSIVE METABOLIC 17945     Bicarbonate           23 mmol/L  Unknown

 

          COMPREHENSIVE METABOLIC 27420     AGAP                8 mmol/L  2016 Unknown

 

          GFR CALC  1280901   GFR Non Afr Amr           47 mL/min 2016 Unk

nown

 

          GFR CALC  1683577   GFR Afr Amr           57 mL/min 2016 Unknown

 

          GLYCOSYLATED HEMOGLOBIN TEST 81315     Hgb A1c   94917-7   5.5 %     0

2016 Unknown

 

          THYROID STIMULATING HORMONE 88762     TSH                 2.537 uIU/mL

 2016 Unknown

 

          FREE T4   12644     T4 Free             1.36 ng/dL 2016 Unknown

 

          COMPLETE BLOOD COUNT [id number]   WBC                 6.8 10e9/L 20

16 Unknown

 

          COMPLETE BLOOD COUNT [id number]   RBC                 4.20 10e12/L 2016 Unknown

 

          COMPLETE BLOOD COUNT 3628512   HEMOGLOBIN           12.5 g/dL 20

16 Unknown

 

          COMPLETE BLOOD COUNT 3499570   HEMATOCRIT           38.0 %    20

16 Unknown

 

          COMPLETE BLOOD COUNT 0908856   MCV                 90.5 fL   

6 Unknown

 

          COMPLETE BLOOD COUNT [id number]   MCH                 29.8 pg   

6 Unknown

 

          COMPLETE BLOOD COUNT 6942796   MCHC                32.9 g/dL 

6 Unknown

 

          COMPLETE BLOOD COUNT 7784131   PLATELET COUNT           197 10e9/L  Unknown

 

          COMPLETE BLOOD COUNT 2263849   Mean Plt Volume           11.7 fL    Unknown

 

          COMPLETE BLOOD COUNT 9005643   Neut Auto           41.3 %    

6 Unknown

 

          COMPLETE BLOOD COUNT 2469715   Lymph Auto           47.1 %    20

16 Unknown

 

          COMPLETE BLOOD COUNT 2873703   Mono Auto           7.8 %     

6 Unknown

 

          COMPLETE BLOOD COUNT 5781099   RDW                 13.8 %    

6 Unknown

 

          COMPLETE BLOOD COUNT [id number]   Eos Auto            3.4 %     

6 Unknown

 

          COMPLETE BLOOD COUNT [id number]   Baso Auto           0.4 %     

6 Unknown

 

          COMPLETE BLOOD COUNT 1062378   Neutrophil Abs           2.81 10e9/L  Unknown

 

          COMPLETE BLOOD COUNT [id number]   Lymphocyte Abs           3.20 10e9/L  Unknown

 

          COMPLETE BLOOD COUNT [id number]   Monocyte Abs           0.53 10e9/L  Unknown

 

          COMPLETE BLOOD COUNT [id number]   Eosinophil Abs           0.23 10e9/L  Unknown

 

          COMPLETE BLOOD COUNT [id number]   RDW-SD              44.4 fL   

6 Unknown

 

          COMPLETE BLOOD COUNT 1474093   Basophil Abs           0.03 10e9/L  Unknown

 

          MEAN GLUC 2751395   Calc Mean Gluc           111 mg/dL 2016 Unkn

own

 

          [place] [address]     Glucose             89 MG/DL   Unknown

 

          [place] [address]     CREATININE           1.12 MG/DL  Unknown

 

          [place] [address]     BUN                 20 MG/DL   Unknown

 

          [place] [address]     SODIUM              139 MMOL/L  Unknown

 

          [place] [address]     POTASSIUM           4.6 MMOL/L  Unknown

 

          [place] [address]     CHLORIDE            108 MMOL/L  Unknown

 

          [place] [address]     BICARB              26 MMOL/L  Unknown

 

          [place] [address]     ANION GAP           5 MEQ/L    Unknown

 

          [place] [address]     CALCIUM             10.0 MG/DL  Unknown

 

          GFR CALC  4875450   GFR AA              57.0L ML/MIN 2015 Unknow

n

 

          GFR CALC  9387528   GFR NON-AA           47.0L ML/MIN 2015 Unkno

wn

 

          THYROID STIMULATING HORMONE 99637     TSH                 2.378 uIU/ML

 09/10/2015 Unknown

 

          COMPLETE BLOOD COUNT 9137240   WBC                 6.4 10e9/L 09/10/20

15 Unknown

 

          COMPLETE BLOOD COUNT [id number]   RBC                 3.99 10e12/L 09/10/

2015 Unknown

 

          COMPLETE BLOOD COUNT [id number]   HGB                 11.9 g/dL 09/10/201

5 Unknown

 

          COMPLETE BLOOD COUNT [id number]   HCT DET             36.9 %    09/10/201

5 Unknown

 

          COMPLETE BLOOD COUNT 2493573   MCV                 92.5 fL   09/10/201

5 Unknown

 

          COMPLETE BLOOD COUNT [id number]   MCH                 29.8 pg   09/10/201

5 Unknown

 

          COMPLETE BLOOD COUNT [id number]   MCHC                32.2 g/dL 09/10/201

5 Unknown

 

          COMPLETE BLOOD COUNT [id number]   PLT                 172 10e9/L 09/10/20

15 Unknown

 

          COMPLETE BLOOD COUNT [id number]   MPV                 11.7 fL   09/10/201

5 Unknown

 

          COMPLETE BLOOD COUNT [id number]   ARIK %               40.4 %    09/10/201

5 Unknown

 

          COMPLETE BLOOD COUNT 3158883   LY %                48.0 %    09/10/201

5 Unknown

 

          COMPLETE BLOOD COUNT [id number]   MON %               8.3 %     09/10/201

5 Unknown

 

          COMPLETE BLOOD COUNT [id number]   EOS  %              2.8 %     09/10/201

5 Unknown

 

          COMPLETE BLOOD COUNT 4355712   BASO %              0.5 %     09/10/201

5 Unknown

 

          COMPLETE BLOOD COUNT [id number]   RDW                 13.6 %    09/10/201

5 Unknown

 

          COMPLETE BLOOD COUNT [id number]   ABS ARIK             2.59 10e9/L 09/10/2

015 Unknown

 

          COMPLETE BLOOD COUNT [id number]   ABS LYMPH           3.07 10e9/L 09/10/2

015 Unknown

 

          COMPLETE BLOOD COUNT [id number]   ABS MONO            0.53 10e9/L 09/10/2

015 Unknown

 

          COMPLETE BLOOD COUNT [id number]   ABS EOS             0.18 10e9/L 09/10/2

015 Unknown

 

          COMPLETE BLOOD COUNT [id number]   ABS BASO            0.03 10e9/L 09/10/2

015 Unknown

 

          COMPLETE BLOOD COUNT [id number]   RDW-SD              44.9 fL   09/10/201

5 Unknown

 

          LIPID GROUP [id number]     HDL TEST            42 MG/DL  09/10/2015 Unknown

 

          LIPID GROUP 64863     TRIG                177 MG/DL 09/10/2015 Unknown

 

          LIPID GROUP 79311     TEST LDL            72 MG/DL  09/10/2015 Unknown

 

          LIPID GROUP 65953     CHOL                149 MG/DL 09/10/2015 Unknown

 

          LIPID GROUP 22736     RCHOL/HDL           3.55 RATIO 09/10/2015 Unknow

n

 

          LIPID GROUP 13466     NON-HDL CH           107 MG/DL 09/10/2015 Unknow

n

 

          GLYCOSYLATED HEMOGLOBIN TEST 58077     A1C HPLC  10350-2   5.5 %     0

9/10/2015 Unknown

 

          FREE T4   05454     FREE T4             1.39 NG/DL 09/10/2015 Unknown

 

          GFR CALC  3867485   GFR AA              55.0L ML/MIN 09/10/2015 Unknow

n

 

          GFR CALC  5021854   GFR NON-AA           46.0L ML/MIN 09/10/2015 Unkno

wn

 

          COMPREHENSIVE METABOLIC 02366     AST                 17 U/L    09/10/

2015 Unknown

 

          COMPREHENSIVE METABOLIC 81242     ALT                 10 IU/L   09/10/

2015 Unknown

 

          COMPREHENSIVE METABOLIC 73404     BUN                 20 MG/DL  09/10/

2015 Unknown

 

          COMPREHENSIVE METABOLIC 14891     ALBUMIN             3.9 GM/DL 09/10/

2015 Unknown

 

          COMPREHENSIVE METABOLIC 02870     CHLORIDE            111 MMOL/L 09/10

/2015 Unknown

 

          COMPREHENSIVE METABOLIC 88588     BILI TOT            0.4 MG/DL 09/10/

2015 Unknown

 

          COMPREHENSIVE METABOLIC 33530     ALK PHOS            70 U/L    09/10/

2015 Unknown

 

          COMPREHENSIVE METABOLIC 10561     SODIUM              142 MMOL/L 09/10

/2015 Unknown

 

          COMPREHENSIVE METABOLIC 20973     CREATININE           1.16 MG/DL  Unknown

 

          COMPREHENSIVE METABOLIC 00617     CALCIUM             9.4 MG/DL 09/10/

2015 Unknown

 

          COMPREHENSIVE METABOLIC 93266     POTASSIUM           4.6 MMOL/L 09/10

/2015 Unknown

 

          COMPREHENSIVE METABOLIC 62564     PROT TOT            6.2 GM/DL 09/10/

2015 Unknown

 

          COMPREHENSIVE METABOLIC 96669     Glucose             90 MG/DL  09/10/

2015 Unknown

 

          COMPREHENSIVE METABOLIC 50867     BICARB              24 MMOL/L 09/10/

2015 Unknown

 

          COMPREHENSIVE METABOLIC 54543     ANION GAP           7 MEQ/L   09/10/

2015 Unknown

 

          THYROID STIMULATING HORMONE 31178     TSH                 2.427 uIU/ML

 2015 Unknown

 

          LIPID GROUP [id number]     HDL TEST            47 MG/DL  2015 Unknown

 

          LIPID GROUP 37366     TRIG                145 MG/DL 2015 Unknown

 

          LIPID GROUP 66422     TEST LDL            73 MG/DL  2015 Unknown

 

          LIPID GROUP 73452     CHOL                149 MG/DL 2015 Unknown

 

          LIPID GROUP 59110     RCHOL/HDL           3.17 RATIO 2015 Unknow

n

 

          LIPID GROUP 53451     NON-HDL CH           102 MG/DL 2015 Unknow

n

 

          COMPREHENSIVE METABOLIC 89562     AST                 17 U/L    2015 Unknown

 

          COMPREHENSIVE METABOLIC 08933     ALT                 9 IU/L    2015 Unknown

 

          COMPREHENSIVE METABOLIC 97472     BUN                 19 MG/DL  2015 Unknown

 

          COMPREHENSIVE METABOLIC 43212     ALBUMIN             4.3 GM/DL 2015 Unknown

 

          COMPREHENSIVE METABOLIC 74953     CHLORIDE            108 MMOL/L  Unknown

 

          COMPREHENSIVE METABOLIC 39608     BILI TOT            0.5 MG/DL 2015 Unknown

 

          COMPREHENSIVE METABOLIC 31207     ALK PHOS            68 U/L    2015 Unknown

 

          COMPREHENSIVE METABOLIC 21326     SODIUM              140 MMOL/L  Unknown

 

          COMPREHENSIVE METABOLIC 50563     CREATININE           1.08 MG/DL 2015 Unknown

 

          COMPREHENSIVE METABOLIC 09559     CALCIUM             9.9 MG/DL 2015 Unknown

 

          COMPREHENSIVE METABOLIC 02748     POTASSIUM           4.3 MMOL/L  Unknown

 

          COMPREHENSIVE METABOLIC 57735     PROT TOT            7.2 GM/DL 2015 Unknown

 

          COMPREHENSIVE METABOLIC 99757     Glucose             94 MG/DL  2015 Unknown

 

          COMPREHENSIVE METABOLIC 46040     BICARB              26 MMOL/L 2015 Unknown

 

          COMPREHENSIVE METABOLIC 32203     ANION GAP           6 MEQ/L   2015 Unknown

 

          GFR CALC  2078653   GFR AA              60.0L ML/MIN 2015 Unknow

n

 

          GFR CALC  4467154   GFR NON-AA           49.0L ML/MIN 2015 Unkno

wn

 

          GLYCOSYLATED HEMOGLOBIN TEST 23755     A1C HPLC  20380-2   5.6 %     0

3/06/2015 Unknown

 

          COMPLETE BLOOD COUNT 2454854   WBC                 7.2 10e9/L 20

15 Unknown

 

          COMPLETE BLOOD COUNT 3995529   RBC                 4.28 10e12/L 2015 Unknown

 

          COMPLETE BLOOD COUNT 5847203   HGB                 12.8 g/dL 

5 Unknown

 

          COMPLETE BLOOD COUNT 2832151   HCT DET             39.3 %    

5 Unknown

 

          COMPLETE BLOOD COUNT 2629717   MCV                 91.8 fL   

5 Unknown

 

          COMPLETE BLOOD COUNT 1979552   MCH                 29.9 pg   

5 Unknown

 

          COMPLETE BLOOD COUNT 9379814   MCHC                32.6 g/dL 

5 Unknown

 

          COMPLETE BLOOD COUNT [id number]   PLT                 189 10e9/L 20

15 Unknown

 

          COMPLETE BLOOD COUNT 9072418   MPV                 11.2 fL   

5 Unknown

 

          COMPLETE BLOOD COUNT [id number]   ARIK %               38.0 %    

5 Unknown

 

          COMPLETE BLOOD COUNT 6124613   LY %                51.0 %    

5 Unknown

 

          COMPLETE BLOOD COUNT 9480720   MON %               7.7 %     

5 Unknown

 

          COMPLETE BLOOD COUNT 0266216   EOS  %              2.9 %     

5 Unknown

 

          COMPLETE BLOOD COUNT 5989523   BASO %              0.4 %     

5 Unknown

 

          COMPLETE BLOOD COUNT 2514525   RDW                 14.0 %    

5 Unknown

 

          COMPLETE BLOOD COUNT [id number]   ABS ARIK             2.74 10e9/L 

015 Unknown

 

          COMPLETE BLOOD COUNT 8934043   ABS LYMPH           3.67 10e9/L 

015 Unknown

 

          COMPLETE BLOOD COUNT 6296398   ABS MONO            0.55 10e9/L 

015 Unknown

 

          COMPLETE BLOOD COUNT 1036619   ABS EOS             0.21 10e9/L 

015 Unknown

 

          COMPLETE BLOOD COUNT 1548811   ABS BASO            0.03 10e9/L 

015 Unknown

 

          COMPLETE BLOOD COUNT 3123520   RDW-SD              46.1 fL   

5 Unknown

 

          FREE T4   25188     FREE T4             1.14 NG/DL 2015 Unknown

 

          GLYCOSYLATED HEMOGLOBIN TEST 34122     A1C HPLC  33364-0   5.2 %     0

2014 Unknown

 

          FREE T4   73691     FREE T4             1.40 NG/DL 2014 Unknown

 

          GFR CALC  8027989   GFR AA              >60 ML/MIN 2014 Unknown

 

          GFR CALC  7240565   GFR NON-AA           52.0L ML/MIN 2014 Unkno

wn

 

          COMPREHENSIVE METABOLIC 50616     AST                 15 U/L    2014 Unknown

 

          COMPREHENSIVE METABOLIC 02757     ALT                 9 IU/L    2014 Unknown

 

          COMPREHENSIVE METABOLIC 84479     BUN                 17 MG/DL  2014 Unknown

 

          COMPREHENSIVE METABOLIC 29830     ALBUMIN             4.0 GM/DL 2014 Unknown

 

          COMPREHENSIVE METABOLIC 58602     CHLORIDE            112 MMOL/L  Unknown

 

          COMPREHENSIVE METABOLIC 54530     BILI TOT            0.5 MG/DL 2014 Unknown

 

          COMPREHENSIVE METABOLIC 26512     ALK PHOS            66 U/L    2014 Unknown

 

          COMPREHENSIVE METABOLIC 85083     SODIUM              140 MMOL/L  Unknown

 

          COMPREHENSIVE METABOLIC 31626     CREATININE           1.03 MG/DL  Unknown

 

          COMPREHENSIVE METABOLIC 83251     CALCIUM             9.5 MG/DL 2014 Unknown

 

          COMPREHENSIVE METABOLIC 29954     POTASSIUM           4.1 MMOL/L  Unknown

 

          COMPREHENSIVE METABOLIC 48406     PROT TOT            6.2 GM/DL 2014 Unknown

 

          COMPREHENSIVE METABOLIC 31550     Glucose             102 MG/DL 2014 Unknown

 

          COMPREHENSIVE METABOLIC 75807     BICARB              23 MMOL/L 2014 Unknown

 

          COMPREHENSIVE METABOLIC 05315     ANION GAP           5 MEQ/L   2014 Unknown

 

          THYROID STIMULATING HORMONE 50947     TSH                 2.074 uIU/ML

 2014 Unknown

 

          VITAMIN B 12 FOLIC ACID [id number] VIT B 12            423 PG/ML  Unknown

 

          VITAMIN B 12 FOLIC ACID 09340|10126 FOLIC ACID           19.7 NG/ML  Unknown

 

          LIPID GROUP [id number]     HDL TEST            40 MG/DL  2014 Unknown

 

          LIPID GROUP 46242     TRIG                145 MG/DL 2014 Unknown

 

          LIPID GROUP 40656     TEST LDL            81 MG/DL  2014 Unknown

 

          LIPID GROUP 33328     CHOL                150 MG/DL 2014 Unknown

 

          LIPID GROUP 41071     RCHOL/HDL           3.75 RATIO 2014 Unknow

n

 

          COMPLETE BLOOD COUNT 8034630   WBC                 6.0 10e9/L 20

14 Unknown

 

          COMPLETE BLOOD COUNT 4803005   RBC                 4.26 10e12/L 2014 Unknown

 

          COMPLETE BLOOD COUNT 4007253   HGB                 12.7 g/dL 

4 Unknown

 

          COMPLETE BLOOD COUNT 8596725   HCT DET             38.7 %    

4 Unknown

 

          COMPLETE BLOOD COUNT [id number]   MCV                 90.8 fL   

4 Unknown

 

          COMPLETE BLOOD COUNT 7960648   MCH                 29.8 pg   

4 Unknown

 

          COMPLETE BLOOD COUNT 1374081   MCHC                32.8 g/dL 

4 Unknown

 

          COMPLETE BLOOD COUNT [id number]   PLT                 178 10e9/L 20

14 Unknown

 

          COMPLETE BLOOD COUNT [id number]   MPV                 11.7 fL   

4 Unknown

 

          COMPLETE BLOOD COUNT [id number]   ARIK %               30.5 %    

4 Unknown

 

          COMPLETE BLOOD COUNT 5522904   LY %                55.4 %    

4 Unknown

 

          COMPLETE BLOOD COUNT 3127843   MON %               9.0 %     

4 Unknown

 

          COMPLETE BLOOD COUNT 5199714   EOS  %              4.4 %     

4 Unknown

 

          COMPLETE BLOOD COUNT 2362826   BASO %              0.7 %     

4 Unknown

 

          COMPLETE BLOOD COUNT 3202762   RDW                 13.3 %    

4 Unknown

 

          COMPLETE BLOOD COUNT [id number]   ABS ARIK             1.83 10e9/L 

014 Unknown

 

          COMPLETE BLOOD COUNT 3959553   ABS LYMPH           3.32 10e9/L 

014 Unknown

 

          COMPLETE BLOOD COUNT 3797933   ABS MONO            0.54 10e9/L 

014 Unknown

 

          COMPLETE BLOOD COUNT 5661908   ABS EOS             0.26 10e9/L 

014 Unknown

 

          COMPLETE BLOOD COUNT [id number]   ABS BASO            0.04 10e9/L 

014 Unknown

 

          COMPLETE BLOOD COUNT 1779178   RDW-SD              43.2 fL   

4 Unknown

 

          HEMOGLOBIN A1C (GLYCOSYLATED) 0896813   A1C HPLC  13303-7   5.5 %     

2012 Unknown

 

          COMPLETE BLOOD COUNT 6643476   WBC                 6.0 10e9/L 20

12 Unknown

 

          COMPLETE BLOOD COUNT [id number]   RBC                 4.22 10e12/L 2012 Unknown

 

          COMPLETE BLOOD COUNT 7190425   HGB                 12.4 g/dL 

2 Unknown

 

          COMPLETE BLOOD COUNT [id number]   HCT DET             38.2 %    

2 Unknown

 

          COMPLETE BLOOD COUNT [id number]   MCV                 90.5 fL   

2 Unknown

 

          COMPLETE BLOOD COUNT [id number]   MCH                 29.4 pg   

2 Unknown

 

          COMPLETE BLOOD COUNT [id number]   MCHC                32.5 g/dL 

2 Unknown

 

          COMPLETE BLOOD COUNT [id number]   PLT                 187 10e9/L 20

12 Unknown

 

          COMPLETE BLOOD COUNT 7477773   MPV                 11.5 fL   

2 Unknown

 

          COMPLETE BLOOD COUNT [id number]   ARIK %               36.4 %    

2 Unknown

 

          COMPLETE BLOOD COUNT [id number]   LY %                51.0 %    

2 Unknown

 

          COMPLETE BLOOD COUNT [id number]   MON %               8.7 %     

2 Unknown

 

          COMPLETE BLOOD COUNT 8033817   EOS  %              3.2 %     

2 Unknown

 

          COMPLETE BLOOD COUNT 2066096   BASO %              0.7 %     

2 Unknown

 

          COMPLETE BLOOD COUNT 9806050   RDW                 13.7 %    

2 Unknown

 

          COMPLETE BLOOD COUNT [id number]   ABS ARIK             2.18 10e9/L 

012 Unknown

 

          COMPLETE BLOOD COUNT [id number]   ABS LYMPH           3.06 10e9/L 

012 Unknown

 

          COMPLETE BLOOD COUNT [id number]   ABS MONO            0.52 10e9/L 

012 Unknown

 

          COMPLETE BLOOD COUNT 7827751   ABS EOS             0.19 10e9/L 

012 Unknown

 

          COMPLETE BLOOD COUNT [id number]   ABS BASO            0.04 10e9/L 

012 Unknown

 

          COMPLETE BLOOD COUNT [id number]   RDW-SD              44.3 fL   

2 Unknown

 

          LIPID GROUP [id number]     HDL TEST            42 MG/DL  2012 Unknown

 

          LIPID GROUP 12506     TRIG                156 MG/DL 2012 Unknown

 

          LIPID GROUP [id number]     TEST LDL            80 MG/DL  2012 Unknown

 

          LIPID GROUP 32601     CHOL                153 MG/DL 2012 Unknown

 

          LIPID GROUP [id number]     RCHOL/HDL           3.64 RATIO 2012 Unknow

n

 

          FREE T4   82331     FREE T4             1.22 NG/DL 2012 Unknown

 

          COMPREHENSIVE METABOLIC 09904     AST                 20 U/L    2012 Unknown

 

          COMPREHENSIVE METABOLIC 91949     ALT                 11 IU/L   2012 Unknown

 

          COMPREHENSIVE METABOLIC 43332     BUN                 19 MG/DL  2012 Unknown

 

          COMPREHENSIVE METABOLIC 39[address]     ALBUMIN             4.3 GM/DL 2012 Unknown

 

          COMPREHENSIVE METABOLIC 66366     CHLORIDE            109 MMOL/L  Unknown

 

          COMPREHENSIVE METABOLIC 62751     BILI TOT            0.6 MG/DL 2012 Unknown

 

          COMPREHENSIVE METABOLIC 99406     ALK PHOS            84 U/L    2012 Unknown

 

          COMPREHENSIVE METABOLIC 29619     SODIUM              142 MMOL/L  Unknown

 

          COMPREHENSIVE METABOLIC 05594     CREATININE           1.09 MG/DL  Unknown

 

          COMPREHENSIVE METABOLIC 90515     CALCIUM             9.8 MG/DL 2012 Unknown

 

          COMPREHENSIVE METABOLIC 26547     POTASSIUM           4.2 MMOL/L  Unknown

 

          COMPREHENSIVE METABOLIC 30511     PROT TOT            6.4 GM/DL 2012 Unknown

 

          COMPREHENSIVE METABOLIC 16676     Glucose             89 MG/DL  2012 Unknown

 

          COMPREHENSIVE METABOLIC 94903     BICARB              25 MMOL/L 2012 Unknown

 

          COMPREHENSIVE METABOLIC 06687     ANION GAP           8 MEQ/L   2012 Unknown

 

          GFR CALC  [id number]   GFR AA              60.0L ML/MIN 2012 Unknow

n

 

          GFR CALC  1382552   GFR NON-AA           49.0L ML/MIN 2012 Unkno

wn

 

          THYROID STIMULATING HORMONE 34757     TSH                 2.450 uIU/ML

 2012 Unknown

 

          COMPREHENSIVE METABOLIC 38156     AST                 22 U/L    2012 Unknown

 

          COMPREHENSIVE METABOLIC 75361     ALT                 14 IU/L   2012 Unknown

 

          COMPREHENSIVE METABOLIC 31862     BUN                 21 MG/DL  2012 Unknown

 

          COMPREHENSIVE METABOLIC 76298     ALBUMIN             4.3 GM/DL 2012 Unknown

 

          COMPREHENSIVE METABOLIC 59879     CHLORIDE            106 MMOL/L  Unknown

 

          COMPREHENSIVE METABOLIC 21836     BILI TOT            0.4 MG/DL 2012 Unknown

 

          COMPREHENSIVE METABOLIC 77444     ALK PHOS            80 U/L    2012 Unknown

 

          COMPREHENSIVE METABOLIC 90999     SODIUM              141 MMOL/L  Unknown

 

          COMPREHENSIVE METABOLIC 90734     CREATININE           1.13 MG/DL  Unknown

 

          COMPREHENSIVE METABOLIC 86585     CALCIUM             9.4 MG/DL 2012 Unknown

 

          COMPREHENSIVE METABOLIC 70137     POTASSIUM           4.3 MMOL/L  Unknown

 

          COMPREHENSIVE METABOLIC 68900     PROT TOT            6.7 GM/DL 2012 Unknown

 

          COMPREHENSIVE METABOLIC 73741     Glucose             98 MG/DL  2012 Unknown

 

          COMPREHENSIVE METABOLIC 13113     BICARB              25 MMOL/L 2012 Unknown

 

          COMPREHENSIVE METABOLIC 53292     ANION GAP           10 MEQ/L  2012 Unknown

 

          LIPID GROUP 54411     HDL TEST            44 MG/DL  2012 Unknown

 

          LIPID GROUP [id number]     TRIG                164 MG/DL 2012 Unknown

 

          LIPID GROUP 69869     TEST LDL            98 MG/DL  2012 Unknown

 

          LIPID GROUP [id number]     CHOL                175 MG/DL 2012 Unknown

 

          LIPID GROUP [id number]     RCHOL/HDL           3.98 RATIO 2012 Unknow

n

 

          COMPLETE BLOOD COUNT 16614     WBC                 6.7 10e9/L 20

12 Unknown

 

          COMPLETE BLOOD COUNT 10616     RBC                 4.36 10e12/L 2012 Unknown

 

          COMPLETE BLOOD COUNT 89899     HGB                 12.9 g/dL 

2 Unknown

 

          COMPLETE BLOOD COUNT 86581     HCT DET             39.4 %    

2 Unknown

 

          COMPLETE BLOOD COUNT 30145     MCV                 90.4 fL   

2 Unknown

 

          COMPLETE BLOOD COUNT 88462     MCH                 29.6 pg   

2 Unknown

 

          COMPLETE BLOOD COUNT 06381     MCHC                32.7 g/dL 

2 Unknown

 

          COMPLETE BLOOD COUNT 84612     PLT                 184 10e9/L 20

12 Unknown

 

          COMPLETE BLOOD COUNT 00155     MPV                 10.9 fL   

2 Unknown

 

          COMPLETE BLOOD COUNT 55161     ARIK %               41.5 %    

2 Unknown

 

          COMPLETE BLOOD COUNT 85090     LY %                45.7 %    

2 Unknown

 

          COMPLETE BLOOD COUNT 12845     MON %               9.4 %     

2 Unknown

 

          COMPLETE BLOOD COUNT 25375     EOS  %              3.0 %     

2 Unknown

 

          COMPLETE BLOOD COUNT 27095     BASO %              0.4 %     

2 Unknown

 

          COMPLETE BLOOD COUNT 81388     RDW                 13.2 %    

2 Unknown

 

          COMPLETE BLOOD COUNT 96367     ABS ARIK             2.78 10e9/L 

012 Unknown

 

          COMPLETE BLOOD COUNT 57713     ABS LYMPH           3.06 10e9/L 

012 Unknown

 

          COMPLETE BLOOD COUNT 07668     ABS MONO            0.63 10e9/L 

012 Unknown

 

          COMPLETE BLOOD COUNT 03725     ABS EOS             0.20 10e9/L 

012 Unknown

 

          COMPLETE BLOOD COUNT 48411     ABS BASO            0.03 10e9/L 

012 Unknown

 

          COMPLETE BLOOD COUNT 61325     RDW-SD              42.3 fL   

2 Unknown

 

          GFR CALC  0784945   GFR AA              57.0L ML/MIN 2012 Unknow

n

 

          GFR CALC  6157100   GFR NON-AA           47.0L ML/MIN 2012 Unkno

wn

 

          THYROID STIMULATING HORMONE 55506     TSH                 2.663 uIU/ML

 2012 Unknown

 

          FREE T4   55615     FREE T4             1.15 NG/DL 2012 Unknown

 

          THYROID STIMULATING HORMONE 06684     TSH                 1.908 uIU/ML

 2011 Unknown

 

          COMPLETE BLOOD COUNT 92016     WBC                 6.4 10e9/L 20

11 Unknown

 

          COMPLETE BLOOD COUNT 29500     RBC                 3.92 10e12/L 2011 Unknown

 

          COMPLETE BLOOD COUNT 53217     HGB                 11.8 g/dL 

1 Unknown

 

          COMPLETE BLOOD COUNT 82390     HCT DET             36.0 %    

1 Unknown

 

          COMPLETE BLOOD COUNT 02682     MCV                 91.8 fL   

1 Unknown

 

          COMPLETE BLOOD COUNT 98362     MCH                 30.1 pg   

1 Unknown

 

          COMPLETE BLOOD COUNT 83985     MCHC                32.8 g/dL 

1 Unknown

 

          COMPLETE BLOOD COUNT 72261     PLT                 176 10e9/L 20

11 Unknown

 

          COMPLETE BLOOD COUNT 95742     MPV                 11.4 fL   

1 Unknown

 

          COMPLETE BLOOD COUNT 80621     ARIK %               50.4 %    

1 Unknown

 

          COMPLETE BLOOD COUNT 64808     LY %                35.5 %    

1 Unknown

 

          COMPLETE BLOOD COUNT 11695     MON %               10.2 %    

1 Unknown

 

          COMPLETE BLOOD COUNT 62098     EOS  %              3.3 %     

1 Unknown

 

          COMPLETE BLOOD COUNT 25383     BASO %              0.6 %     

1 Unknown

 

          COMPLETE BLOOD COUNT 52747     RDW                 13.7 %    

1 Unknown

 

          COMPLETE BLOOD COUNT 84768     ABS ARIK             3.23 10e9/L 

011 Unknown

 

          COMPLETE BLOOD COUNT 87017     ABS LYMPH           2.27 10e9/L 

011 Unknown

 

          COMPLETE BLOOD COUNT 99078     ABS MONO            0.65 10e9/L 

011 Unknown

 

          COMPLETE BLOOD COUNT 35237     ABS EOS             0.21 10e9/L 

011 Unknown

 

          COMPLETE BLOOD COUNT 08256     ABS BASO            0.04 10e9/L 

011 Unknown

 

          COMPLETE BLOOD COUNT 19700     RDW-SD              45.3 fL   

1 Unknown

 

          GFR CALC  0156619   GFR AA              >60 ML/MIN 2011 Unknown

 

          GFR CALC  0563463   GFR NON-AA           53.0L ML/MIN 2011 Unkno

wn

 

          FREE T4   38308     FREE T4             1.20 NG/DL 2011 Unknown

 

          COMPREHENSIVE METABOLIC 47196     AST                 17 U/L    2011 Unknown

 

          COMPREHENSIVE METABOLIC 96808     ALT                 9 IU/L    2011 Unknown

 

          COMPREHENSIVE METABOLIC 08604     BUN                 16 MG/DL  2011 Unknown

 

          COMPREHENSIVE METABOLIC 36369     ALBUMIN             4.0 GM/DL 2011 Unknown

 

          COMPREHENSIVE METABOLIC 36288     CHLORIDE            108 MMOL/L  Unknown

 

          COMPREHENSIVE METABOLIC 64109     BILI TOT            0.5 MG/DL 2011 Unknown

 

          COMPREHENSIVE METABOLIC 73604     ALK PHOS            76 U/L    2011 Unknown

 

          COMPREHENSIVE METABOLIC 48754     SODIUM              139 MMOL/L  Unknown

 

          COMPREHENSIVE METABOLIC 87760     CREATININE           1.02 MG/DL 2011 Unknown

 

          COMPREHENSIVE METABOLIC 35437     CALCIUM             9.2 MG/DL 2011 Unknown

 

          COMPREHENSIVE METABOLIC 23154     POTASSIUM           4.5 MMOL/L  Unknown

 

          COMPREHENSIVE METABOLIC 46948     PROT TOT            6.1 GM/DL 2011 Unknown

 

          COMPREHENSIVE METABOLIC 66254     Glucose             93 MG/DL  2011 Unknown

 

          COMPREHENSIVE METABOLIC 96595     BICARB              26 MMOL/L 2011 Unknown

 

          COMPREHENSIVE METABOLIC 00048     ANION GAP           5 MEQ/L   2011 Unknown

 

          LIPID GROUP 21960     HDL TEST            46 MG/DL  2011 Unknown

 

          LIPID GROUP 27868     TRIG                102 MG/DL 2011 Unknown

 

          LIPID GROUP 66264     TEST LDL            88 MG/DL  2011 Unknown

 

          LIPID GROUP 33333     CHOL                154 MG/DL 2011 Unknown

 

          LIPID GROUP 22784     RCHOL/HDL           3.35 RATIO 2011 Unknow

n







Procedures





                    Procedure           Codes               Date

 

                    ROUTINE VENIPUNCTURE CPT-4: 51391        2019

 

                    LIPID PANEL         CPT-4: 81689        2019

 

                    FLU VACC PRSV FREE INC ANTIG 65 AND OLDER CPT-4: 56578      

  10/22/2019

 

                    FLU VACC PRSV FREE INC ANTIG 65 AND OLDER CPT-4: 91809      

  10/22/2019

 

                    ADMIN INFLUENZA VIRUS VAC CPT-4:         10/22/2019

 

                    COMPREHEN METABOLIC PANEL CPT-4: 93724        10/11/2019

 

                    ROUTINE VENIPUNCTURE CPT-4: 63625        10/11/2019

 

                    ROUTINE VENIPUNCTURE CPT-4: 23418        06/10/2019

 

                    ASSAY THYROID STIM HORMONE CPT-4: 11617        06/10/2019

 

                    COMPREHEN METABOLIC PANEL CPT-4: 31508        06/10/2019

 

                    COMPLETE CBC W/AUTO DIFF WBC CPT-4: 81026        06/10/2019

 

                    URINALYSIS NONAUTO W/O SCOPE CPT-4: 59138        2019

 

                    URINE CULTURE/ COLONY COUNT CPT-4: 00921        2019

 

                    URINE CULTURE/ COLONY COUNT CPT-4: 60418        10/02/2018

 

                    ROUTINE VENIPUNCTURE CPT-4: 18863        2018

 

                    ASSAY OF FREE THYROXINE CPT-4: 58935        2018

 

                    ASSAY THYROID STIM HORMONE CPT-4: 37804        2018

 

                    COMPLETE CBC W/AUTO DIFF WBC CPT-4: 08983        2018

 

                    METABOLIC PANEL TOTAL CA CPT-4: 57186        2018

 

                    FLU VACC PRSV FREE INC ANTIG 65 AND OLDER CPT-4: 76183      

  2018

 

                    ASSAY, GLUCOSE, BLOOD QUANT CPT-4: 39645        2018

 

                    ADMIN INFLUENZA VIRUS VAC CPT-4:         2018

 

                    ROUTINE VENIPUNCTURE CPT-4: 66492        2018

 

                    COMPREHEN METABOLIC PANEL CPT-4: 54395        2018

 

                    COMPLETE CBC W/AUTO DIFF WBC CPT-4: 05056        2018

 

                    A1C HPLC            CPT-4: 33924        2018

 

                    ASSAY OF TROPONIN QUANT CPT-4: 49589        2018

 

                    LIPID PANEL         CPT-4: 23119        2018

 

                    THER/PROPH/DIAG INJ SC/IM CPT-4: 88793        2018

 

                    TRIAMCINOLONE ACET INJ NOS CPT-4:         2018

 

                    URINALYSIS NONAUTO W/O SCOPE CPT-4: 84574        2018

 

                    URINE CULTURE/ COLONY COUNT CPT-4: 57945        2018

 

                    FLU VACC PRSV FREE INC ANTIG 65 AND OLDER CPT-4: 62757      

  10/06/2017

 

                    ADMIN INFLUENZA VIRUS VAC CPT-4:         10/06/2017

 

                    ROUTINE VENIPUNCTURE CPT-4: 43048        2017

 

                    COMPREHEN METABOLIC PANEL CPT-4: 46669        2017

 

                    COMPLETE CBC W/AUTO DIFF WBC CPT-4: 57201        2017

 

                    LIPID PANEL         CPT-4: 67851        2017

 

                    A1C HPLC            CPT-4: 76325        2017

 

                    ASSAY THYROID STIM HORMONE CPT-4: 41531        2017

 

                    ROUTINE VENIPUNCTURE CPT-4: 63578        2017

 

                    ASSAY THYROID STIM HORMONE CPT-4: 04632        2017

 

                    COMPREHEN METABOLIC PANEL CPT-4: 77860        2017

 

                    COMPLETE CBC W/AUTO DIFF WBC CPT-4: 88119        2017

 

                    A1C HPLC            CPT-4: 04398        2017

 

                    FLU VACC PRSV FREE INC ANTIG 65 AND OLDER CPT-4: 83600      

  10/07/2016

 

                    ADMIN INFLUENZA VIRUS VAC CPT-4:         10/07/2016

 

                    ROUTINE VENIPUNCTURE CPT-4: 16329        2016

 

                    ASSAY OF FREE THYROXINE CPT-4: 09576        2016

 

                    ASSAY THYROID STIM HORMONE CPT-4: 99592        2016

 

                    COMPREHEN METABOLIC PANEL CPT-4: 55861        2016

 

                    COMPLETE CBC W/AUTO DIFF WBC CPT-4: 80908        2016

 

                    LIPID PANEL         CPT-4: 05203        2016

 

                    A1C HPLC            CPT-4: 92368        2016

 

                    URINALYSIS NONAUTO W/O SCOPE CPT-4: 51844        2016

 

                    ROUTINE VENIPUNCTURE CPT-4: 20948        2015

 

                    METABOLIC PANEL TOTAL CA CPT-4: 36347        2015

 

                    PRESCRIP TRANSMIT VIA ERX SY CPT-4:         2015

 

                    FLU VACC PRSV FREE INC ANTIG 65 AND OLDER CPT-4: 24747      

  10/16/2015

 

                    ADMIN INFLUENZA VIRUS VAC CPT-4:         10/16/2015

 

                    URINALYSIS NONAUTO W/O SCOPE CPT-4: 00697        09/15/2015

 

                    URINE CULTURE/ COLONY COUNT CPT-4: 59360        09/15/2015

 

                    ROUTINE VENIPUNCTURE CPT-4: 72328        09/10/2015

 

                    ASSAY OF FREE THYROXINE CPT-4: 11584        09/10/2015

 

                    ASSAY THYROID STIM HORMONE CPT-4: 36793        09/10/2015

 

                    COMPREHEN METABOLIC PANEL CPT-4: 67100        09/10/2015

 

                    COMPLETE CBC W/AUTO DIFF WBC CPT-4: 14184        09/10/2015

 

                    LIPID PANEL         CPT-4: 58024        09/10/2015

 

                    A1C HPLC            CPT-4: 90523        09/10/2015

 

                    CERUM REMOVAL       CPT-4: 67683        2015

 

                    PRESCRIP TRANSMIT VIA ERX SY CPT-4:         2015

 

                    FLUZONE, 5ML (Medicare) CPT-4:         10/17/2014

 

                    ADMIN INFLUENZA VIRUS VAC CPT-4:         10/17/2014

 

                    PRESCRIP TRANSMIT VIA ERX SY CPT-4:         2014

 

                    PRESCRIP TRANSMIT VIA ERX SY CPT-4:         2014

 

                    PRESCRIP TRANSMIT VIA ERX SY CPT-4:         2014

 

                    ROUTINE VENIPUNCTURE CPT-4: 00148        2014

 

                    ASSAY OF FREE THYROXINE CPT-4: 73130        2014

 

                    ASSAY THYROID STIM HORMONE CPT-4: 82688        2014

 

                    COMPREHEN METABOLIC PANEL CPT-4: 16563        2014

 

                    COMPLETE CBC W/AUTO DIFF WBC CPT-4: 56771        2014

 

                    LIPID PANEL         CPT-4: 36479        2014

 

                    A1C HPLC            CPT-4: 69923        2014

 

                    VITAMIN B 12 FOLIC ACID CPT-4: 22729|44613  2014

 

                    PRESCRIP TRANSMIT VIA ERX SY CPT-4:         2014

 

                    PRESCRIP TRANSMIT VIA ERX SY CPT-4:         10/15/2013

 

                    FLUZONE, 5ML (Medicare) CPT-4:         2013

 

                    ADMIN INFLUENZA VIRUS VAC CPT-4:         2013

 

                    PRESCRIP TRANSMIT VIA ERX SY CPT-4:         2013

 

                    PRESCRIP TRANSMIT VIA ERX SY CPT-4:         05/10/2013

 

                    ROUTINE VENIPUNCTURE CPT-4: 81301        2012

 

                    ASSAY OF FREE THYROXINE CPT-4: 68615        2012

 

                    ASSAY THYROID STIM HORMONE CPT-4: 98879        2012

 

                    COMPREHEN METABOLIC PANEL CPT-4: 91721        2012

 

                    COMPLETE CBC W/AUTO DIFF WBC CPT-4: 81388        2012

 

                    LIPID PANEL         CPT-4: 54801        2012

 

                    A1C GLYCOSYLATED HEMOGLOBIN TEST CPT-4: 05294        

012

 

                    CERUM REMOVAL       CPT-4: 20185        2012

 

                    PRESCRIP TRANSMIT VIA ERX SY CPT-4:         2012

 

                    PRESCRIP TRANSMIT VIA ERX SY CPT-4:         10/10/2012

 

                    FLUZONE, 5ML (Medicare) CPT-4:         2012

 

                    ADMIN INFLUENZA VIRUS VAC CPT-4:         2012

 

                    ASSAY, GLUCOSE, BLOOD QUANT CPT-4: 92176        2012

 

                    URINALYSIS NONAUTO W/O SCOPE CPT-4: 50922        2012

 

                    URINE CULTURE/ COLONY COUNT CPT-4: 30422        2012

 

                    ROUTINE VENIPUNCTURE CPT-4: 67065        2012

 

                    ASSAY OF FREE THYROXINE CPT-4: 26245        2012

 

                    ASSAY THYROID STIM HORMONE CPT-4: 38620        2012

 

                    COMPREHEN METABOLIC PANEL CPT-4: 18925        2012

 

                    COMPLETE CBC W/AUTO DIFF WBC CPT-4: 81917        2012

 

                    LIPID PANEL         CPT-4: 57537        2012

 

                    ASSAY OF INSULIN    CPT-4: 54087        2012

 

                    A1C GLYCOSYLATED HEMOGLOBIN TEST CPT-4: 93201        

012

 

                    DRAIN/INJECT JOINT/BURSA CPT-4: 14714        2012

 

                    METHYLPREDNISOLONE 40 MG INJ CPT-4:         2012

 

                    TRIAMCINOLONE ACET INJ NOS CPT-4:         2012

 

                    PRESCRIP TRANSMIT VIA ERX SY CPT-4:         2012

 

                    PRESCRIP TRANSMIT VIA ERX SY CPT-4:         2012

 

                    METHYLPREDNISOLONE 40 MG INJ CPT-4:         2012

 

                    DRAIN/INJECT JOINT/BURSA CPT-4: 14122        2012

 

                    TRIAMCINOLONE ACET INJ NOS CPT-4:         2012

 

                    PRESCRIP TRANSMIT VIA ERX SY CPT-4:         2012

 

                    ROUTINE VENIPUNCTURE CPT-4: 42948        2012

 

                    ASSAY OF FREE THYROXINE CPT-4: 28912        2012

 

                    ASSAY THYROID STIM HORMONE CPT-4: 17838        2012

 

                    COMPREHEN METABOLIC PANEL CPT-4: 61343        2012

 

                    COMPLETE CBC W/AUTO DIFF WBC CPT-4: 41581        2012

 

                    LIPID PANEL         CPT-4: 49704        2012

 

                    PRESCRIP TRANSMIT VIA ERX SY CPT-4:         2012

 

                    CERUM REMOVAL       CPT-4: 35031        2011

 

                    PRESCRIP TRANSMIT VIA ERX SY CPT-4:         2011

 

                    FLUZONE, 5ML (Medicare) CPT-4:         10/05/2011

 

                    ADMIN INFLUENZA VIRUS VAC CPT-4:         10/05/2011

 

                    PRESCRIP TRANSMIT VIA ERX SY CPT-4:         2011

 

                    URINALYSIS NONAUTO W/O SCOPE CPT-4: 42560        2011

 

                    URINE CULTURE/ COLONY COUNT CPT-4: 37613        2011

 

                    CUR TOBACCO NON-USER CPT-4:         2011

 

                    ROUTINE VENIPUNCTURE CPT-4: 51885        2011

 

                    COMPLETE CBC W/AUTO DIFF WBC CPT-4: 28329        2011

 

                    COMPREHEN METABOLIC PANEL CPT-4: 95907        2011

 

                    LIPID PANEL         CPT-4: 19502        2011

 

                    ASSAY THYROID STIM HORMONE CPT-4: 28306        2011

 

                    ASSAY OF FREE THYROXINE CPT-4: 44363        2011

 

                    PRESCRIP TRANSMIT VIA ERX SY CPT-4:         2011

 

                    INJ TRIGGER POINT 1/2 MUSCL CPT-4: 49490        2011

 

                    TRIAMCINOLONE ACET INJ NOS CPT-4:         2011

 

                    METHYLPREDNISOLONE 40 MG INJ CPT-4:         2011

 

                    THER/PROPH/DIAG INJ SC/IM CPT-4: 27014        2011

 

                    KETOROLAC TROMETHAMINE INJ CPT-4:         2011

 

                    PRESCRIP TRANSMIT VIA ERX SY CPT-4:         2010

 

                    FLU VACCINE 3 YRS & > IM UP 64 CPT-4: 90890        10/06/201

0

 

                    ADMIN INFLUENZA VIRUS VAC CPT-4:         10/06/2010

 

                    URINALYSIS NONAUTO W/O SCOPE CPT-4: 61273        2010

 

                    URINE CULTURE/ COLONY COUNT CPT-4: 67771        2010

 

                    PRESCRIP TRANSMIT VIA ERX SY CPT-4:         2010

 

                    THER/PROPH/DIAG INJ SC/IM CPT-4: 15280        2010

 

                    VITAMIN B12 INJECTION CPT-4:         2010

 

                    THER/PROPH/DIAG INJ SC/IM CPT-4: 77565        2010

 

                    VITAMIN B12 INJECTION CPT-4:         2010

 

                    ROUTINE VENIPUNCTURE CPT-4: 54737        2010







Vital Signs





                          Date                      Vital

 

                    2020          Blood Pressure 1: 144/82 Code: 8480-6 

art Rate 1: 56 bpm

 

             2019   Blood Pressure 1: 140/70 Code: 8480-6 Heart Rate 1: 57

 bpm SpO2: 99%    

Temperature: 35.9 (C) / 96.6 (F)        Weight: 215 lbs 

 

                06/10/2019      Blood Pressure 1: 144/70 Code: 8480-6 Heart Rate

 1: 60 bpm 

Respiratory Rate: 20 bpm SpO2: 95%           Temperature: 36.4 (C) / 97.6 (F) We

ight: 214 

lbs 

 

                2019      Blood Pressure 1: 128/78 Code: 8480-6 Heart Rate

 1: 56 bpm 

Respiratory Rate: 20 bpm SpO2: 98%           Temperature: 36.3 (C) / 97.4 (F) We

ight: 213 

lbs 

 

                2018      Blood Pressure 1: 142/60 Code: 8480-6 BMI: 38.2 

Code: 27488-4 Heart 

Rate 1: 48 bpm  Height: 5'2"    Respiratory Rate: 20 bpm SpO2: 98%       Tempera

ture: 36.7

(C) / 98.1 (F)                          Weight: 212 lbs 

 

                2018      Blood Pressure 1: 142/64 Code: 8480-6 BMI: 38.5 

Code: 88043-3 Heart 

Rate 1: 52 bpm  Height: 5'2"    Respiratory Rate: 22 bpm SpO2: 96%       Tempera

ture: 36.1

(C) / 96.9 (F)                          Weight: 214 lbs 

 

                10/02/2018      Blood Pressure 1: 124/80 Code: 8480-6 BMI: 38.3 

Code: 74180-5 Heart 

Rate 1: 68 bpm      Height: 5'2"        Respiratory Rate: 20 bpm Temperature: 36

.3 (C) / 

97.4 (F)                                Weight: 213 lbs 

 

                2018      Blood Pressure 1: 132/78 Code: 8480-6 BMI: 37.6 

Code: 60395-6 Heart 

Rate 1: 68 bpm  Height: 5'2"    Respiratory Rate: 20 bpm SpO2: 97%       Tempera

ture: 36.8

(C) / 98.2 (F)                          Weight: 209 lbs 

 

                2018      Blood Pressure 1: 150/76 Code: 8480-6 BMI: 38.5 

Code: 19377-3 Heart 

Rate 1: 64 bpm  Height: 5'2"    Respiratory Rate: 20 bpm SpO2: 97%       Tempera

ture: 36.2

(C) / 97.2 (F)                          Weight: 214 lbs 

 

                2018      Blood Pressure 1: 122/74 Code: 8480-6 BMI: 38.2 

Code: 35603-9 Heart 

Rate 1: 64 bpm  Height: 5'2"    Respiratory Rate: 18 bpm SpO2: 96%       Tempera

ture: 35.8

(C) / 96.4 (F)                          Weight: 212 lbs 

 

                2018      Blood Pressure 1: 124/78 Code: 8480-6 BMI: 37.8 

Code: 74133-0 Heart 

Rate 1: 76 bpm      Height: 5'2"        Respiratory Rate: 20 bpm Temperature: 36

.8 (C) / 

98.3 (F)                                Weight: 210 lbs 

 

                2018      Blood Pressure 1: 136/70 Code: 8480-6 BMI: 38.0 

Code: 90288-5 Heart 

Rate 1: 68 bpm  Height: 5'2"    Respiratory Rate: 20 bpm SpO2: 97%       Tempera

ture: 36.8

(C) / 98.2 (F)                          Weight: 211 lbs 

 

                2018      Blood Pressure 1: 140/65 Code: 8480-6 Heart Rate

 1: 75 bpm 

Respiratory Rate: 24 bpm SpO2: 95%           Temperature: 37.0 (C) / 98.6 (F) We

ight: 211 

lbs 

 

                2018      Blood Pressure 1: 154/70 Code: 8480-6 BMI: 37.6 

Code: 12333-7 Heart 

Rate 1: 76 bpm  Height: 5'2"    Respiratory Rate: 20 bpm SpO2: 98%       Tempera

ture: 36.9

(C) / 98.5 (F)                          Weight: 209 lbs 

 

                2017      Blood Pressure 1: 156/70 Code: 8480-6 BMI: 37.1 

Code: 32794-9 Heart 

Rate 1: 72 bpm  Height: 5'2"    Respiratory Rate: 20 bpm SpO2: 97%       Tempera

ture: 37.0

(C) / 98.6 (F)                          Weight: 206 lbs 

 

                2017      Blood Pressure 1: 152/78 Code: 8480-6 BMI: 36.8 

Code: 56898-7 Heart 

Rate 1: 78 bpm  Height: 5'2"    Respiratory Rate: 20 bpm SpO2: 98%       Tempera

ture: 36.1

(C) / 97.0 (F)                          Weight: 204 lbs 

 

                2017      Blood Pressure 1: 142/70 Code: 8480-6 BMI: 36.9 

Code: 30192-4 Heart 

Rate 1: 64 bpm  Height: 5'2"    Respiratory Rate: 20 bpm SpO2: 96%       Tempera

ture: 36.5

(C) / 97.7 (F)                          Weight: 205 lbs 

 

                2017      Blood Pressure 1: 142/68 Code: 8480-6 Heart Rate

 1: 88 bpm 

Respiratory Rate: 18 bpm SpO2: 98%           Temperature: 36.3 (C) / 97.3 (F) We

ight: 209 

lbs 

 

                2016      Blood Pressure 1: 130/78 Code: 8480-6 Heart Rate

 1: 68 bpm 

Respiratory Rate: 20 bpm SpO2: 95%           Temperature: 36.4 (C) / 97.6 (F) We

ight: 212 

lbs 

 

                2016      Blood Pressure 1: 122/64 Code: 8480-6 BMI: 39.1 

Code: 13949-6 Heart 

Rate 1: 76 bpm      Height: 5'2"        Respiratory Rate: 20 bpm Temperature: 36

.8 (C) / 

98.2 (F)                                Weight: 217 lbs 

 

                2016      Blood Pressure 1: 144/70 Code: 8480-6 BMI: 39.4 

Code: 54650-5 Heart 

Rate 1: 76 bpm      Height: 5'2"        Respiratory Rate: 20 bpm Temperature: 36

.6 (C) / 

97.9 (F)                                Weight: 219 lbs 

 

                2015      Blood Pressure 1: 152/60 Code: 8480-6 BMI: 39.6 

Code: 03961-7 Heart 

Rate 1: 84 bpm      Height: 5'2"        Respiratory Rate: 20 bpm Temperature: 37

.0 (C) / 

98.6 (F)                                Weight: 220 lbs 

 

                2015      Blood Pressure 1: 146/76 Code: 8480-6 BMI: 39.8 

Code: 52897-7 Heart 

Rate 1: 88 bpm      Height: 5'2"        Respiratory Rate: 20 bpm Temperature: 37

.0 (C) / 

98.6 (F)                                Weight: 221 lbs 

 

                2015      Blood Pressure 1: 132/70 Code: 8480-6 BMI: 39.1 

Code: 39333-2 Heart 

Rate 1: 88 bpm      Height: 5'2"        Respiratory Rate: 20 bpm Temperature: 36

.4 (C) / 

97.6 (F)                                Weight: 217 lbs 

 

                2015      Blood Pressure 1: 132/66 Code: 8480-6 BMI: 39.9 

Code: 35317-2 Heart 

Rate 1: 72 bpm      Height: 5'2"        Respiratory Rate: 20 bpm Temperature: 36

.9 (C) / 

98.4 (F)                                Weight: 218 lbs 

 

                2015      Blood Pressure 1: 136/80 Code: 8480-6 Heart Rate

 1: 76 bpm 

Respiratory Rate: 20 bpm  Temperature: 36.7 (C) / 98.0 (F) Weight: 224 lbs 

 

                2015      Blood Pressure 1: 134/78 Code: 8480-6 BMI: 39.7 

Code: 56985-1 Heart 

Rate 1: 84 bpm      Height: 5'2"        Respiratory Rate: 20 bpm Temperature: 36

.7 (C) / 

98.0 (F)                                Weight: 217 lbs 

 

                2014      Blood Pressure 1: 146/78 Code: 8480-6 BMI: 39.5 

Code: 26010-1 Heart 

Rate 1: 82 bpm      Height: 5'2"        Respiratory Rate: 18 bpm Temperature: 35

.6 (C) / 

96.1 (F)                                Weight: 216 lbs 

 

                2014      Blood Pressure 1: 134/70 Code: 8480-6 BMI: 37.9 

Code: 51337-1 Heart 

Rate 1: 80 bpm      Height: 5'3"        Respiratory Rate: 20 bpm Temperature: 36

.8 (C) / 

98.2 (F)                                Weight: 214 lbs 

 

                2014      Blood Pressure 1: 132/70 Code: 8480-6 BMI: 37.6 

Code: 72193-8 Heart 

Rate 1: 80 bpm      Height: 5'3"        Respiratory Rate: 20 bpm Temperature: 36

.8 (C) / 

98.3 (F)                                Weight: 212 lbs 

 

                2014      Blood Pressure 1: 116/74 Code: 8480-6 Heart Rate

 1: 68 bpm 

Respiratory Rate: 20 bpm  Temperature: 36.2 (C) / 97.1 (F) Weight: 212 lbs 

 

                10/15/2013      Blood Pressure 1: 132/82 Code: 8480-6 BMI: 37.4 

Code: 80849-9 Heart 

Rate 1: 76 bpm      Height: 5'3"        Respiratory Rate: 20 bpm Temperature: 36

.7 (C) / 

98.0 (F)                                Weight: 211 lbs 

 

                    2013          Blood Pressure 1: 130/76 Code: 8480-6 He

art Rate 1: 78 bpm

 

                2013      Blood Pressure 1: 140/82 Code: 8480-6 BMI: 36.8 

Code: 50581-9 Heart 

Rate 1: 66 bpm      Height: 5'3"        Respiratory Rate: 20 bpm Temperature: 36

.1 (C) / 

96.9 (F)                                Weight: 208 lbs 

 

                2013      Blood Pressure 1: 138/80 Code: 8480-6 BMI: 36.4 

Code: 93321-7 Heart 

Rate 1: 72 bpm      Height: 5'4"        Respiratory Rate: 20 bpm Temperature: 36

.7 (C) / 

98.0 (F)                                Weight: 212 lbs 

 

                2013      Blood Pressure 1: 124/70 Code: 8480-6 BMI: 36.9 

Code: 58403-6 Heart 

Rate 1: 60 bpm      Height: 5'4"        Temperature: 36.1 (C) / 97.0 (F) Weight:

 215 lbs 

 

                05/10/2013      Blood Pressure 1: 132/86 Code: 8480-6 BMI: 36.9 

Code: 18703-2 Heart 

Rate 1: 76 bpm      Height: 5'4"        Respiratory Rate: 20 bpm Temperature: 36

.8 (C) / 

98.2 (F)                                Weight: 215 lbs 

 

                2013      Blood Pressure 1: 134/82 Code: 8480-6 BMI: 36.6 

Code: 58116-3 Heart 

Rate 1: 72 bpm      Height: 5'4"        Respiratory Rate: 20 bpm Temperature: 36

.3 (C) / 

97.4 (F)                                Weight: 213 lbs 

 

                2012      Blood Pressure 1: 142/80 Code: 8480-6 BMI: 37.1 

Code: 82126-3 Heart 

Rate 1: 76 bpm      Height: 5'4"        Respiratory Rate: 20 bpm Temperature: 36

.8 (C) / 

98.3 (F)                                Weight: 216 lbs 

 

                10/23/2012      Blood Pressure 1: 128/68 Code: 8480-6 BMI: 37.6 

Code: 56439-1 Heart 

Rate 1: 72 bpm      Height: 5'4"        Respiratory Rate: 20 bpm Temperature: 36

.6 (C) / 

97.9 (F)                                Weight: 219 lbs 

 

                10/10/2012      Blood Pressure 1: 122/70 Code: 8480-6 Heart Rate

 1: 76 bpm 

Respiratory Rate: 20 bpm  Temperature: 36.7 (C) / 98.1 (F) Weight: 218 lbs 

 

                    2012          Blood Pressure 1: 132/80 Code: 8480-6 He

art Rate 1: 84 bpm

 

                2012      Blood Pressure 1: 128/78 Code: 8480-6 BMI: 38.1 

Code: 77331-9 Heart 

Rate 1: 84 bpm      Height: 5'4"        Respiratory Rate: 20 bpm Temperature: 36

.9 (C) / 

98.4 (F)                                Weight: 222 lbs 

 

                2012      Blood Pressure 1: 138/80 Code: 8480-6 BMI: 37.9 

Code: 87959-0 Heart 

Rate 1: 74 bpm      Height: 5'4"        Temperature: 36.1 (C) / 97.0 (F) Weight:

 221 lbs 

 

                2012      Blood Pressure 1: 126/78 Code: 8480-6 BMI: 38.4 

Code: 04743-5 Heart 

Rate 1: 72 bpm      Height: 5'4"        Respiratory Rate: 20 bpm Temperature: 36

.7 (C) / 

98.0 (F)                                Weight: 224 lbs 

 

                2012      Blood Pressure 1: 138/72 Code: 8480-6 BMI: 38.4 

Code: 11715-9 Heart 

Rate 1: 72 bpm      Height: 5'4"        Respiratory Rate: 20 bpm Temperature: 36

.6 (C) / 

97.9 (F)                                Weight: 224 lbs 

 

                2012      Blood Pressure 1: 122/78 Code: 8480-6 BMI: 38.8 

Code: 70553-6 Heart 

Rate 1: 88 bpm      Height: 5'4"        Respiratory Rate: 20 bpm Temperature: 36

.6 (C) / 

97.8 (F)                                Weight: 226 lbs 

 

                2012      Blood Pressure 1: 116/60 Code: 8480-6 BMI: 38.1 

Code: 45733-9 Heart 

Rate 1: 92 bpm      Height: 5'4"        Respiratory Rate: 20 bpm Temperature: 36

.8 (C) / 

98.2 (F)                                Weight: 222 lbs 

 

                2011      Blood Pressure 1: 118/62 Code: 8480-6 BMI: 37.9 

Code: 86978-2 Heart 

Rate 1: 80 bpm      Height: 5'4"        Temperature: 36.5 (C) / 97.7 (F) Weight:

 221 lbs 

 

                2011      Blood Pressure 1: 132/70 Code: 8480-6 Heart Rate

 1: 84 bpm 

Respiratory Rate: 20 bpm  Temperature: 36.7 (C) / 98.0 (F) Weight: 221 lbs 

 

                2011      Blood Pressure 1: 114/72 Code: 8480-6 BMI: 37.6 

Code: 16817-6 Heart 

Rate 1: 76 bpm      Height: 5'4"        Respiratory Rate: 20 bpm Temperature: 36

.8 (C) / 

98.3 (F)                                Weight: 219 lbs 

 

                    2011          Blood Pressure 1: 136/76 Code: 8480-6 Te

mperature: 36.0 (C) / 96.8 

(F)                                     Weight: 219 lbs 8 oz

 

                2011      Blood Pressure 1: 128/80 Code: 8480-6 Heart Rate

 1: 72 bpm 

Temperature: 36.3 (C) / 97.4 (F)        Weight: 218 lbs 

 

                2011      Blood Pressure 1: 126/70 Code: 8480-6 Heart Rate

 1: 72 bpm 

Temperature: 36.7 (C) / 98.0 (F)

 

                    2010          Blood Pressure 1: 126/78 Code: 8480-6 Te

mperature: 36.2 (C) / 97.1 

(F)                                     Weight: 217 lbs 8 oz

 

                2010      Blood Pressure 1: 116/70 Code: 8480-6 Heart Rate

 1: 72 bpm 

Temperature: 36.4 (C) / 97.6 (F)        Weight: 222 lbs 

 

                2010      Blood Pressure 1: 114/78 Code: 8480-6 Heart Rate

 1: 72 bpm 

Temperature: 37.1 (C) / 98.8 (F)        Weight: 225 lbs 

 

                2010      Blood Pressure 1: 128/78 Code: 8480-6 Heart Rate

 1: 76 bpm 

Temperature: 36.6 (C) / 97.8 (F)        Weight: 224 lbs 

 

                2010      Blood Pressure 1: 116/78 Code: 8480-6 Heart Rate

 1: 80 bpm 

Temperature: 36.4 (C) / 97.6 (F)        Weight: 226 lbs 

 

                2010      Blood Pressure 1: 120/70 Code: 8480-6 BMI: 38.3 

Code: 44296-7 Heart 

Rate 1: 88 bpm      Height: 5'5"        Temperature: 36.4 (C) / 97.6 (F) Weight:

 230 lbs 







Functional Status

No Functional Status data



Reason For Visit





                    Reason For Visit    Effective Dates     Notes

 

                    blood pressure check 2020           

 

                    lab draw            2019           

 

                    lab draw            10/11/2019           

 

                    hearing loss        2019          left ear

 

                    follow up           06/10/2019           

 

                    follow up           2019          Patient has upcoming

 appointment with Dr Claire and carotid 

dopplers tomorrow

 

                    follow up           2018          Patient had heart ca

th on 10-30-18 and one of the bypass 

veins in spasming

 

                    follow up           2018          4 Week

 

                    follow up           10/02/2018           

 

                    follow up           2018           

 

                    high blood pressure 2018          Dr Claire has ordered

 holter monitor and 

hydralazine 50mg PRN

 

                    dizziness           2018          On  morning pa

tient woke up and went to the bathroom 

and after lying down became very dizzy. Patient has hx of vertigo so didn't 
think anything of it. She usually just has them intermittently, but had more 
that day. While at [employer] began to feel very ill and became very shaky. She got 
home and sat in the recliner and had the jittery/nervous feeling. Later that 
night it finally resolved. Patient feels like she had a spell like this around 
the  and thought it was due to extreme heat exhaustion.

 

                    follow up           2018           

 

                    back pain           2018           

 

                    otalgia             2018           

 

                    back pain           2018           

 

                    injection(s)        10/06/2017          flu shot

 

                    lab draw            2017           

 

                    follow up           2017           

 

                    pelvic pain         2017          Patient states pain 

started in May with a "Constant Ache"

to left inguinal area. Patient states at that time pain was intermittent. Then, 
approximately 2nd week  of  pain worsened and radiates to left low back 
area.

 

                    lab draw            2017           

 

                    hyperglycemia       2017           

 

                    cough               2017           

 

                    otalgia             2016           

 

                    injection(s)        10/07/2016          Influenza

 

                    lab draw            2016           

 

                    constipation        2016           

 

                    flank pain          2016           

 

                    follow up           2015          3mo fwup

 

                    injection(s)        10/16/2015          Influenza

 

                    acute renal failure 09/15/2015           

 

                    lab draw            09/10/2015           

 

                    fatigue             2015           

 

                    otalgia             2015           

 

                    pain, limb          2015           

 

                    follow up           2015          [facility] fwup

 

                    high blood pressure 2015           

 

                    injection(s)        10/17/2014          flu shot

 

                    sinusitis           2014           

 

                    high blood pressure 2014           

 

                    cough               2014          Was panfilo at Dr Tyree teixeira's office so he started he on amlodipine 

10mg but seems to be dragging her down. Patient decreased to 1/2 tablet this 
last week

 

                    lab draw            2014           

 

                    cough               2014           

 

                    cough               10/15/2013           

 

                    high blood pressure 2013           

 

                    follow up           2013          ER

 

                    dizziness           2013           

 

                    follow up           2013           

 

                    otalgia             05/10/2013           

 

                    breast complaint    2013           

 

                    lab draw            2012           

 

                    follow up           2012          2mo fwup

 

                    follow up           10/23/2012          2wk fwup

 

                    gas and bloating    10/10/2012          Dr Claire has her mon

itoring because was high in his 

office at last visit

 

                    injection(s)        2012           

 

                    dizziness           2012           

 

                    dizziness           2012           

 

                    lab draw            2012           

 

                    muscle weakness     2012          concerns of simvasta

tin with fatigue and muscle 

weakness

 

                    leg pain/sciatica   2012           

 

                    hip pain            2012           

 

                    back pain           2012          has tried ice pack, 

muscle relaxers, and moist heat

 

                    back pain           2012           

 

                    fatigue             2012          in hands/feet

 

                    otalgia             2011           

 

                    follow up           2011          2wk fwup

 

                    back pain           2011          thinks ulcer may hav

e returned

 

                    lab draw            2011           

 

                    dizziness           2011          Left ear and facial 

pain, right ear pain 

 

                    follow up           2011          1wk fwup

 

                    hip pain            2011          feels very draggy, f

atigue, BP 80/63, normally running 

100's/60's

 

                    chills              2010           

 

                    injection(s)        10/06/2010          flu shot

 

                    follow up           2010          6wk fwup, had HH rep

aired and is starting to feel better, 

started on reglan 10mg tid

 

                    follow up           2010          hosp fwup

 

                    follow up           2010          1mo fwup, saw Dr Danna du and hasn't gave cardiac clearance 

yet for HH repair, did have chemical stress test yesterday and waiting on 
results

 

                    follow up           2010          from EGD/colonoscopy

--has Hiatal Hernia and wants to do 

repair

 

                    follow up           2010           







Encounters





             Encounter    Performer    Location     Codes        Date

 

                                        () NURSE/OUTPATIENT VISIT EST

Diagnosis: Mixed hyperlipidemia[ICD10: E78.2] Akanksha SPENCERER DO [place]            CPT-4: 60086              2019

 

                                        ([id number]) NURSE/OUTPATIENT VISIT EST

Diagnosis: FLU VACCINE[ICD10: Z23] Akanksha BAUMAN SAramis OTOOLEND

ER DO 

[place]                       CPT-4: 42589              10/22/2019

 

                                        ([id number]) NURSE/OUTPATIENT VISIT EST

Diagnosis: Chronic kidney disease, stage 1[ICD10: N18.1] Akanksha SPENCERER DO [place] CPT-4: 73367              10/11/2019

 

                                        ([id number]) OFFICE/OUTPATIENT VISIT EST

Diagnosis: Acute serous otitis media, left ear[ICD10: H65.02] Nat DRIVER [facility] CPT-4: 67455              2019

 

                                        (04599) OFFICE/OUTPATIENT VISIT EST

Diagnosis: Essential (primary) hypertension[ICD10: I10]

Diagnosis: Coronary atherosclerosis due to calcified coronary lesion[ICD10: 
I25.84]

Diagnosis: Hypoglycemia, unspecified[ICD10: E16.2]

Diagnosis: Other fatigue[ICD10: R53.83]

Diagnosis: Urinary tract infection, site not specified[ICD10: N39.0] Akanksha DRIVER [facility] CPT-4: 20918        06/10/2019

 

                                        ([id number]) OFFICE/OUTPATIENT VISIT EST

Diagnosis: Essential (primary) hypertension[ICD10: I10]

Diagnosis: Gastro-esophageal reflux disease without esophagitis[ICD10: K21.9]

Diagnosis: Urinary tract infection, site not specified[ICD10: N39.0] Akanksha DRIVER [facility] CPT-4: 51304        2019

 

                                        ([id number]) OFFICE/OUTPATIENT VISIT EST

Diagnosis: Gastro-esophageal reflux disease without esophagitis[ICD10: K21.9]

Diagnosis: Epigastric pain[ICD10: R10.13] Akanksha DRIVER [facility]            CPT-4: 60292              2018

 

                                        ([id number]) OFFICE/OUTPATIENT VISIT EST

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

Diagnosis: Essential (primary) hypertension[ICD10: I10]

Diagnosis: Generalized anxiety disorder[ICD10: F41.1]

Diagnosis: Gastro-esophageal reflux disease without esophagitis[ICD10: K21.9] 

Akanksha DRIVER [facility] CPT-4: 72069        2018

 

                                        OFFICE/OUTPATIENT VISIT EST

Diagnosis: Gastro-esophageal reflux disease without esophagitis[ICD10: K21.9]

Diagnosis: Palpitations[ICD10: R00.2]

Diagnosis: Urinary tract infection, site not specified[ICD10: N39.0]

Diagnosis: Generalized anxiety disorder[ICD10: F41.1] Akanksha DRIVER [facility] CPT-4: 58757              10/02/2018

 

                                        (77337) NURSE/OUTPATIENT VISIT EST

Diagnosis: Coronary atherosclerosis due to calcified coronary lesion[ICD10: 
I25.84]

Diagnosis: Hypoglycemia, unspecified[ICD10: E16.2]

Diagnosis: Dizziness and giddiness[ICD10: R42]

Diagnosis: Occlusion and stenosis of bilateral carotid arteries[ICD10: I65.23] 

Akanksha DRIVER [facility] CPT-4: 58563        2018

 

                                        OFFICE/OUTPATIENT VISIT EST

Diagnosis: Epigastric pain[ICD10: R10.13]

Diagnosis: Generalized anxiety disorder[ICD10: F41.1]

Diagnosis: FLU VACCINE[ICD10: Z23] Akanksha KEY [facility] 

[facility]                       CPT-4: 66638              2018

 

                                        ([id number]) OFFICE/OUTPATIENT VISIT EST

Diagnosis: Essential (primary) hypertension[ICD10: I10]

Diagnosis: Hypoglycemia, unspecified[ICD10: E16.2]

Diagnosis: Gastro-esophageal reflux disease without esophagitis[ICD10: K21.9] 

Akanksha DRIEVR [facility] CPT-4: 92831        2018

 

                                        ([id number]) OFFICE/OUTPATIENT VISIT EST

Diagnosis: Dizziness and giddiness[ICD10: R42] Nat DRIVER [facility]            CPT-4: 89640              2018

 

                                        ([id number]) OFFICE/OUTPATIENT VISIT EST

Diagnosis: Cervicalgia[ICD10: M54.2]

Diagnosis: Other spondylosis with radiculopathy, cervical region[ICD10: M47.22] 

Akanksha DRIVER [facility] CPT-4: 61877        2018

 

                                        (16443) OFFICE/OUTPATIENT VISIT EST

Diagnosis: Hypoglycemia, unspecified[ICD10: E16.2]

Diagnosis: Other spondylosis with radiculopathy, cervical region[ICD10: M47.22]

Diagnosis: Vertigo of central origin, bilateral[ICD10: H81.43]

Diagnosis: Cervicocranial syndrome[ICD10: M53.0] Akanksha KEVINANN MARIE CHEATHAMAramis VILLA [facility]         CPT-4: 19859              2018

 

                                        (57841) OFFICE/OUTPATIENT VISIT EST

Diagnosis: Benign paroxysmal vertigo, bilateral[ICD10: H81.13]

Diagnosis: Otalgia, bilateral[ICD10: H92.03] Nat Lozoya          NYARAMOS CHEATHAMAramis 

[facility]ILL[facility]            CPT-4: 61811              2018

 

                                        ([id number]) OFFICE/OUTPATIENT VISIT EST

Diagnosis: Low back pain[ICD10: M54.5]

Diagnosis: Radiculopathy, lumbosacral region[ICD10: M54.17]

Diagnosis: Left lower quadrant pain[ICD10: R10.32] Akanksha DE LA ROSA

[facility]         CPT-[facility]: 45363              2018

 

                                        ([id number]) OFFICE/OUTPATIENT VISIT EST

Diagnosis: FLU VACCINE[ICD10: Z23] Akanksha BAUMAN SAramis TJ

[facility]                       CPT-4: 64243              10/06/2017

 

                                        ([id number]) OFFICE/OUTPATIENT VISIT EST

Diagnosis: Mixed hyperlipidemia[ICD10: E78.2]

Diagnosis: Essential (primary) hypertension[ICD10: I10]

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

Diagnosis: Impaired fasting glucose[ICD10: R73.01]

Diagnosis: Other fatigue[ICD10: R53.83] Akanksha BAUMAN SAramis 

[facility]

[facility]                    CPT-4: 01700              2017

 

                                        ([id number]) OFFICE/OUTPATIENT VISIT EST

Diagnosis: Pain in thoracic spine[ICD10: M54.6]

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 

[facility] CPT[facility]: 21543        2017

 

                                        ([id number]) OFFICE/OUTPATIENT VISIT EST

Diagnosis: Left lower quadrant pain[ICD10: R10.32]

Diagnosis: Low back pain[ICD10: M54.5] Akanksha SYKES 

[facility]                    CPT-4: 05645              2017

 

                                        ([id number]) OFFICE/OUTPATIENT VISIT EST

Diagnosis: Mixed hyperlipidemia[ICD10: E78.2]

Diagnosis: Essential (primary) hypertension[ICD10: I10]

Diagnosis: Hypoglycemia, unspecified[ICD10: E16.2] Akanksha Villa DRIVER [facility]         CPT-4: 99261              2017

 

                                        ([id number]) OFFICE/OUTPATIENT VISIT EST

Diagnosis: Impaired fasting glucose[ICD10: R73.01]

Diagnosis: Mastodynia[ICD10: N64.4]

Diagnosis: Mixed hyperlipidemia[ICD10: E78.2]

Diagnosis: Essential (primary) hypertension[ICD10: I10]

Diagnosis: Other fatigue[ICD10: R53.83] Akanksharj Driver        AKANKSHA SAramis 

VILLA

[facility]                    CPT-4: 90096              2017

 

                                        (07218) OFFICE/OUTPATIENT VISIT EST

Diagnosis: Acute upper respiratory infection, unspecified[ICD10: J06.9] Luba KEVINQUELINE SAramis VILLA [facility] CPT-4: 14117        2017

 

                                        (41788) OFFICE/OUTPATIENT VISIT EST

Diagnosis: Acute mastoiditis without complications, right ear[ICD10: H70.001] 

Akanksha Xiangndangela HIGHTOWERLINE SAramis VILLA [facility] CPT-4: 08591        2016

 

                                        ([id number]) OFFICE/OUTPATIENT VISIT EST

Diagnosis: FLU VACCINE[ICD10: Z23] Akanksha Xiangndangela HIGHTOWERLINE OLIVIA KEY [facility]                       CPT-4: 04089              10/07/2016

 

                                        (04439) OFFICE/OUTPATIENT VISIT EST

Diagnosis: Mixed hyperlipidemia[ICD10: E78.2]

Diagnosis: Essential (primary) hypertension[ICD10: I10]

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

Diagnosis: Impaired fasting glucose[ICD10: R73.01] Akanksha KEVIN

FELECIATAMMY

SAramis VILLA [facility]         CPT-4: 86694              2016

 

                                        (68614) OFFICE/OUTPATIENT VISIT EST

Diagnosis: Constipation, unspecified[ICD10: K59.00] Akanksha BAUMAN SAramis VILLA [facility] CPT-4: 60103              2016

 

                                        (08727) OFFICE/OUTPATIENT VISIT EST

Diagnosis: Essential (primary) hypertension[ICD10: I10]

Diagnosis: Mixed hyperlipidemia[ICD10: E78.2]

Diagnosis: Chronic kidney disease, stage 1[ICD10: N18.1] Akanksha DRIVER [facility] CPT-4: 84423              2016

 

                                        ([id number]) OFFICE/OUTPATIENT VISIT EST

Diagnosis: Muscle weakness (generalized)[ICD10: M62.81]

Diagnosis: Dizziness and giddiness[ICD10: R42]

Diagnosis: Essential (primary) hypertension[ICD10: I10]

Diagnosis: History of falling[ICD10: Z91.81] Akanksha DRIVER [facility]            CPT-4: 91956              2015

 

                                        ([id number]) OFFICE/OUTPATIENT VISIT EST

Diagnosis: FLU VACCINE[ICD10: Z23] Akanksha KEY [facility] 

[facility]                       CPT-4: 77231              10/16/2015

 

                                        (24031) OFFICE/OUTPATIENT VISIT EST

Diagnosis: Acute renal failure[ICD9: 584.9]

Diagnosis: POLYURIA[ICD9: 788.42] Akanksha LANCE [facility] 

[facility]                       CPT-4: 96174              09/15/2015

 

                                        (69901) OFFICE/OUTPATIENT VISIT EST

Diagnosis: HYPERLIPIDEMIA NEC/NOS[ICD9: 272.4]

Diagnosis: HYPERTENSION[ICD9: 401.9]

Diagnosis: CAD[ICD9: 414.00]

Diagnosis: Hyperglycemia[ICD9: 790.29]

Diagnosis: MALAISE AND FATIGUE[ICD9: 780.79] Akanksha DRIVER [facility]            CPT-4: 88250              09/10/2015

 

                                        ([id number]) OFFICE/OUTPATIENT VISIT EST

Diagnosis: MALAISE AND FATIGUE[ICD9: 780.79]

Diagnosis: HYPOGLYCEMIA[ICD9: 251.2]

Diagnosis: Grieving[ICD9: 309.0]

Diagnosis: ABDOMINAL PAIN[ICD9: 789.00] Akanksha DRIVER

[facility]                    CPT-4: 53230              2015

 

                                        OFFICE/OUTPATIENT VISIT EST

Diagnosis: CERUMEN IMPACTION[ICD9: 380.4]

Diagnosis: EUSTACHIAN TUBE DYSFUNCTION[ICD9: 381.81] Bertha DRIVER [facility] CPT-4: 28255              2015

 

                                        (80563) OFFICE/OUTPATIENT VISIT EST

Diagnosis: Leg pain[ICD9: 729.5]

Diagnosis: SUPERFIC PHLEBITIS-LEG[ICD9: 451.0] Akanksha DRIVER [facility]            CPT-4: 97384              2015

 

                                        (63643) OFFICE/OUTPATIENT VISIT EST

Diagnosis: Thoracic back pain[ICD9: 724.1]

Diagnosis: SPASM OF MUSCLE[ICD9: 728.85] Akanksha DRIVER [facility]            CPT-4: 47846              2015

 

                                        ([id number]) OFFICE/OUTPATIENT VISIT EST

Diagnosis: DIZZINESS/VERTIGO[ICD9: 780.4]

Diagnosis: Benign positional vertigo[ICD9: 386.11]

Diagnosis: HYPERTENSION[ICD9: 401.9]

Diagnosis: Suspicious nevus[ICD9: 238.2] Akanksha DRIVER [facility]            CPT-4: 18593              2015

 

                                        ([id number]) OFFICE/OUTPATIENT VISIT EST

Diagnosis: FLU VACCINE[ICD10: Z23] Akanksha SPENCER

[facility]                       CPT-4: 18128              10/17/2014

 

                                        OFFICE/OUTPATIENT VISIT EST

Diagnosis: SINUSITIS, ACUTE[ICD9: 461.9]

Diagnosis: EUSTACHIAN TUBE DYSFUNCTION[ICD9: 381.81] Bertha DRIVER [facility] CPT-4: 58791              2014

 

                                        (47729) OFFICE/OUTPATIENT VISIT EST

Diagnosis: DIZZINESS/VERTIGO[ICD9: 780.4]

Diagnosis: HYPERTENSION[ICD9: 401.9] Akanksha MEJIA [facility]                       CPT-4: 59309              2014

 

                                        (86890) OFFICE/OUTPATIENT VISIT EST

Diagnosis: HYPERTENSION[ICD9: 401.9]

Diagnosis: MALAISE AND FATIGUE[ICD9: 780.79]

Diagnosis: SINUSITIS, ACUTE[ICD9: 461.9] Akanksha DRIVER [facility]            CPT-4: 91233              2014

 

                                        (20837) OFFICE/OUTPATIENT VISIT EST

Diagnosis: HYPERLIPIDEMIA NEC/NOS[ICD9: 272.4]

Diagnosis: HYPERTENSION[ICD9: 401.9]

Diagnosis: B12 DEFIC ANEMIA NEC[ICD9: 281.1]

Diagnosis: HYPOGLYCEMIA[ICD9: 251.2] Akanksha Villa MEJIA [facility]                       CPT-4: 49291              2014

 

                                        OFFICE/OUTPATIENT VISIT EST

Diagnosis: COUGH[ICD9: 786.2] Bertha DRIVER [facility] 

CPT-4: 32828                            2014

 

                                        OFFICE/OUTPATIENT VISIT EST

Diagnosis: COUGH[ICD9: 786.2]

Diagnosis: URI, ACUTE[ICD9: 465.9] Bertha KEY [facility]                       CPT-4: 56914              10/15/2013

 

                                        (90535) OFFICE/OUTPATIENT VISIT EST

Diagnosis: HYPERTENSION[ICD9: 401.9]

Diagnosis: CEPHALGIA[ICD9: 784.0]

Diagnosis: ANXIETY STATE NOS[ICD9: 300.00]

Diagnosis: FLU VACCINE[ICD9: V04.81] [facility]                       [facility]: 16311              2013

 

                                        ([id number]) OFFICE/OUTPATIENT VISIT EST

Diagnosis: DIZZINESS/VERTIGO[ICD9: 780.4]

Diagnosis: SINUSITIS, ACUTE[ICD9: 461.9]

Diagnosis: Benign positional vertigo[ICD9: 386.11] Akankshatammy Driver        DORITA IZQUIERDOTAMMY

OLIVIA DRIVER [facility]         CPT-4: 68189              2013

 

                                        OFFICE/OUTPATIENT VISIT EST

Diagnosis: OTITIS MEDIA NOS[ICD9: 382.9] Akanksha Xiangndangela DRIVER [facility]            CPT-4: 11571              2013

 

                                        OFFICE/OUTPATIENT VISIT EST

Diagnosis: Perforation of ear drum[ICD9: 384.20]

Diagnosis: SINUSITIS, ACUTE[ICD9: 461.9] Akanksha Driver        [facility]            CPT-[facility]: 48315              05/10/2013

 

                                        (54993) OFFICE/OUTPATIENT VISIT EST

Diagnosis: Mastalgia[ICD9: 611.71] Akanksha KEY [facility]                       CPT-4: 69774              2013

 

                                        ([id number]) OFFICE/OUTPATIENT VISIT EST

Diagnosis: HYPERLIPIDEMIA NEC/NOS[ICD9: 272.4]

Diagnosis: HYPERTENSION[ICD9: 401.9]

Diagnosis: DIZZINESS/VERTIGO[ICD9: 780.4]

Diagnosis: Hyperglycemia[ICD9: 790.29]

Diagnosis: MALAISE AND FATIGUE[ICD9: 780.79] Akanksha Otoolerodo        NYA

E SAramis 

VILLA [facility]            CPT-4: 72679              2012

 

                                        (36834) OFFICE/OUTPATIENT VISIT EST

Diagnosis: EUSTACHIAN TUBE DYSFUNCTION[ICD9: 381.81]

Diagnosis: DIZZINESS/VERTIGO[ICD9: 780.4]

Diagnosis: ABDOMINAL PAIN[ICD9: 789.00]

Diagnosis: GERD[ICD9: 530.81]

Diagnosis: CERUMEN IMPACTION[ICD9: 380.4] Akanksha Otoolendangela HIGHTOWERLINE S

Aramis 

VILLA [facility]            CPT-4: 11925              2012

 

                                        OFFICE/OUTPATIENT VISIT EST

Diagnosis: ABDOMINAL PAIN[ICD9: 789.00]

Diagnosis: DYSPEPSIA[ICD9: 536.8] Akanksha HIGHTOWERLINE SAramis GUERRERO

CASI [facility]                       CPT-4: 73768              10/23/2012

 

                                        (59799) OFFICE/OUTPATIENT VISIT EST

Diagnosis: ABDOMINAL PAIN[ICD9: 789.00]

Diagnosis: DYSPEPSIA[ICD9: 536.8]

Diagnosis: IBS[ICD9: 564.1] Akanksha Villa KEVINQUELINE SAramis VILLA [facility] CPT

-

4: 97426                                10/10/2012

 

                                        OFFICE/OUTPATIENT VISIT EST

Diagnosis: DIZZINESS/VERTIGO[ICD9: 780.4]

Diagnosis: HYPOGLYCEMIA[ICD9: 251.2] Akanksha Xiangrodo BAUMAN SAramis XIANG MEJIA [facility]                       CPT-4: 59178              2012

 

                                        (01843) OFFICE/OUTPATIENT VISIT EST

Diagnosis: URINARY TRACT INFECTION[ICD9: 599.0] Akanksha Orender        KATJA

LINE SAramis

VILLA [facility]            CPT-4: 20053              2012

 

                                        (55813) OFFICE/OUTPATIENT VISIT EST

Diagnosis: HYPERLIPIDEMIA NEC/NOS[ICD9: 272.4]

Diagnosis: HYPERTENSION[ICD9: 401.9]

Diagnosis: MALAISE AND FATIGUE[ICD9: 780.79]

Diagnosis: DIZZINESS/VERTIGO[ICD9: 780.4] Akanksha DRIVER [facility]            CPT-4: 17305              2012

 

                                        (56473) OFFICE/OUTPATIENT VISIT EST

Diagnosis: DIZZINESS/VERTIGO[ICD9: 780.4]

Diagnosis: MALAISE AND FATIGUE[ICD9: 780.79]

Diagnosis: HYPERTENSION[ICD9: 401.9] Akanksha MEJIA [facility]

[facility]                       CPT-4: 47817              2012

 

                                        OFFICE/OUTPATIENT VISIT EST

Diagnosis: LUMB/LUMBOSAC DISC DEGEN[ICD9: 722.52]

Diagnosis: Radiculopathy of leg[ICD9: 724.4]

Diagnosis: SACROILIITIS NEC[ICD9: 720.2]

Diagnosis: SPINAL ENTHESOPATHY[ICD9: 720.1] Akanksha DRIVER [facility]            CPT-4: 52226              2012

 

                                        (01532) OFFICE/OUTPATIENT VISIT EST

Diagnosis: PAIN, LOWER BACK[ICD9: 724.2]

Diagnosis: SPASM OF MUSCLE[ICD9: 728.85]

Diagnosis: SCIATICA[ICD9: 724.3]

Diagnosis: Lumbar degenerative disc disease[ICD9: 722.52] Akanksha DRIVER [facility] CPT-4: 24565              2012

 

                                        (94418) OFFICE/OUTPATIENT VISIT EST

Diagnosis: PAIN IN THORACIC SPINE[ICD9: 724.1]

Diagnosis: SPASM OF MUSCLE[ICD9: 728.85] kAanksha DRIVER [facility]            CPT-4: 50420              2012

 

                                        (10074) OFFICE/OUTPATIENT VISIT EST

Diagnosis: PAIN, LOWER BACK[ICD9: 724.2]

Diagnosis: SPASM OF MUSCLE[ICD9: 728.85]

Diagnosis: Sacroiliac dysfunction[ICD9: 739.4]

Diagnosis: Lumbar degenerative disc disease[ICD9: 722.52] Akanksha OTOOLENDER  [facility] CPT-4: 75484              2012

 

                                        OFFICE/OUTPATIENT VISIT EST

Diagnosis: MALAISE AND FATIGUE[ICD9: 780.79]

Diagnosis: HYPOTENSION[ICD9: 458.9]

Diagnosis: CAD[ICD9: 414.00] Akanksha SPENCERER  [facility] 

CPT-4: 42773                            2012

 

                                        OFFICE/OUTPATIENT VISIT EST

Diagnosis: SINUSITIS, ACUTE[ICD9: 461.9]

Diagnosis: PHARYNGITIS, ACUTE[ICD9: 462]

Diagnosis: CERUMEN IMPACTION[ICD9: 380.4] Akanksha DR[facility]            CPT-4: 54084              2011

 

                                        OFFICE/OUTPATIENT VISIT EST

Diagnosis: PAIN IN THORACIC SPINE[ICD9: 724.1]

Diagnosis: GERD[ICD9: 530.81]

Diagnosis: DIZZINESS/VERTIGO[ICD9: 780.4] Akanksha [facility]            CPT-4: 41609              2011

 

                OFFICE/OUTPATIENT VISIT EST Akanksha OTOOLE

NDER DO [facility] CPT-

4: 56150                                2011

 

                    (32831) OFFICE/OUTPATIENT VISIT EST Akanksha CASTILLO SAramis ORENDER DO 

[facility]                       CPT-4: 34384              2011

 

                                        (05720) OFFICE/OUTPATIENT VISIT, EST

Diagnosis: PAIN, LOWER BACK[ICD9: 724.2] Akanksha Villa BAUMAN SAramis

 

[facility]            CPT-4: 48935              2011

 

                    (16452) OFFICE/OUTPATIENT VISIT, EST Akanksha IZQUIERDOTAMMY SAramis ORENDER DO

[facility]                       CPT-4: 48744              2011

 

                    (32837) OFFICE/OUTPATIENT VISIT, EST Akanksha Driver  DORITA

RJ SAramis ORENDER DO

[facility]                       CPT-4: 23781              2010

 

                    (87499) OFFICE/OUTPATIENT VISIT, EST Akanksharj Otoolendangela  DORITA

FELECIATAMMY S. ORENDER DO

[facility]                       CPT-4: 73215              2010

 

                    (44540) OFFICE/OUTPATIENT VISIT, JED BAKER [facility]

[facility]                       CPT-4: 85628              2010

 

                    (14151) OFFICE/OUTPATIENT VISIT, EST Akanksha DE LA ROSA SAramis ORENDER DO

[facility]                       CPT-4: 08283              2010

 

                    (63326) OFFICE/OUTPATIENT VISIT, JED DE LA ROSA SAramis [facility]

[facility]                       CPT-4: 74560              2010

 

                    (14571) OFFICE/OUTPATIENT VISIT, JED BAKER [facility]END[facility] DO

[facility]                       CPT-4: 94884              2010







Plan of Care





             Planned Activity Notes        Codes        Status       Date

 

                                        Appointment: Akanksha Driver

WPtel:[phone]

                                        [address] [place] [address]                                              LAB             2019

 

                                        Appointment: Akanksha Driver

WPtel:[phone]

                                        [address] [place] [address]

[place]                                              INJECTION       10/22/2019

 

             Patient Education: INFLUENZA VACCINE CDC                           

Completed    10/22/2019

 

                                        Appointment: Akanksha Driver

WPtel:[phone]

                                        [address] [place] [address]                                              LAB             10/11/2019

 

                          Visit Diagnosis Plan: Acute serous otitis media, left 

ear Discussion: instructed

to use flonase and claritin daily for symptom relief. discussed with patient 
that if no improvement in 2 weeks, will need to follow up with ENT for audiology
exam. instructed to call office with any other symptoms such as otalgia, 
dysphagia, fever, etc.

                                        ICD-9 : 381.01

ICD-10 : H65.02

                                                    2019

 

                                        Appointment: Nat Lozoya

                                        [address] [place] [address]                                              ACUTE ILLNESS   2019

 

                          Visit Diagnosis Plan: Urinary tract infection, site no

t specified Discussion: 

Started an old abx prescription

                                        ICD-9 : 599.0

ICD-10 : N39.0

                                                    06/10/2019

 

                          Visit Diagnosis Plan: Hypoglycemia, unspecified Discus

madeleine: Monitor BS At least 

6 small meals a day each with protein

                                        ICD-9 : 251.2

ICD-10 : E16.2

                                                    06/10/2019

 

                          Visit Diagnosis Plan: Essential (primary) hypertension

 Discussion: Stable Check 

CMP

                                        ICD-9 : 401.9

ICD-10 : I10

                                                    06/10/2019

 

                          Visit Diagnosis Plan: Other fatigue Discussion: Check 

CBC, TSH

                                        ICD-9 : 780.79

ICD-10 : R53.83

                                                    06/10/2019

 

                                        Appointment: Akanksha Drivertel:[phone]

                                        [address] [place], [place] [id number]

[place]                                              FOLLOW UP       06/10/2019

 

                          Visit Diagnosis Plan: Essential (primary) hypertension

 Discussion: Stable Sees 

Dr. Clements this month

                                        ICD-9 : 401.9

ICD-10 : I10

                                                    2019

 

                          Visit Diagnosis Plan: Urinary tract infection, site no

t specified Discussion: 

Macrobid 100mg po BID for 1 week

                                        ICD-9 : 599.0

ICD-10 : N39.0

                                                    2019

 

                          Visit Diagnosis Plan: Gastro-esophageal reflux disease

 without esophagitis 

Discussion: Stable on omeprazole

Follow Up: 3 months

                                        ICD-9 : 530.81

ICD-10 : K21.9

                                                    2019

 

                                        Appointment: Akanksha Driver

WPtel:[phone]

                                        [address] [place], [place] [id number]

[place]                                              FOLLOW UP       2019

 

                          Patient Education: metoprolol tartrate- [employer] [facility] [id number] 

[email or web address]/samplemd/resources/[email or web address]-[email or web address]

                                        Completed           2019

 

                                        Appointment: Akanksha Driver

WPtel:[phone]

                                        [address] [place], [place] [address]

[place]                                              CANCELED        02/15/2019

 

                          Visit Diagnosis Plan: Gastro-esophageal reflux disease

 without esophagitis 

Discussion: Continue protonix and carafate Proceed with updated EGD

                                        ICD-9 : 530.81

ICD-10 : K21.9

                                                    2018

 

                          Visit Diagnosis Plan: Epigastric pain Discussion: [facility] CT abdomen/pelvis due to

ongoing abdominal pain

                                        ICD-9 : 789.06

ICD-10 : R10.13

                                                    2018

 

                                        Appointment: Akanksha Driver

WPtel:[phone] [address] [facility]

[place]                                              FOLLOW UP       2018

 

                    Care Plan: CT PELVIS W/O DYE                     LOINC : 361

08-9

                          Pending                   2018

 

                    Care Plan: CT ABDOMEN W/O DYE                     LOINC : 36

103-0

                          Pending                   2018

 

                    Care Plan: Referral Order                     SNOMED-CT : 30

[id number]

                          Pending                   2018

 

                                        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

art disease of native coronary artery 

without angina pectoris                 Discussion: S/P cardiac cath--doing medi

vicki management 

with imdur and metoprolol increased Has fwup with cardiology next week Discussed
cardiac rehab

                                        ICD-9 : 414.00

ICD-10 : I25.10

                                                    2018

 

                          Visit Diagnosis Plan: Generalized anxiety disorder Dis

cussion: Stable

                                        ICD-9 : 300.00

ICD-10 : F41.1

                                                    2018

 

                          Visit Diagnosis Plan: Gastro-esophageal reflux disease

 without esophagitis 

Discussion: Continue protonix at BID dosing and carafate BID

Follow Up: 2 months

                                        ICD-9 : 530.81

ICD-10 : K21.9

                                                    2018

 

                          Visit Diagnosis Plan: Essential (primary) hypertension

 Discussion: Stable

                                        ICD-9 : 401.9

ICD-10 : I10

                                                    2018

 

                                        Appointment: Akanksha Driver

WPtel:[phone]

                                        [facility]

[place]                                              FOLLOW UP       2018

 

                          Visit Diagnosis Plan: Gastro-esophageal reflux disease

 without esophagitis 

Discussion: Continue protonix at BID dosing Continue carafate at q AC and HS 
dosing for 2 more weeks then decrease to BID dosing

Follow Up: 4 weeks

                                        ICD-9 : 530.81

ICD-10 : K21.9

                                                    10/02/2018

 

                          Visit Diagnosis Plan: Urinary tract infection, site no

t specified Discussion: 

Reculture urine

                                        ICD-9 : 599.0

ICD-10 : N39.0

                                                    10/02/2018

 

                          Visit Diagnosis Plan: Generalized anxiety disorder Dis

cussion: Increase xanax to

BID dosing especially with upcoming cardiac testing which is already making 
patient more anxious and worried

                                        ICD-9 : 300.00

ICD-10 : F41.1

                                                    10/02/2018

 

                          Visit Diagnosis Plan: Palpitations Discussion: Bobby walter going to schedule 

Lexiscan

                                        ICD-9 : 785.1

ICD-10 : R00.2

                                                    10/02/2018

 

                                        Appointment: Akanksha Driver

WPtel:[phone]

                                        [address] [place] [address]                                              FOLLOW UP       10/02/2018

 

             Patient Education: Patient Medication Summary                      

     Completed    10/02/2018

 

                                        Appointment: Akanksha Driver

WPtel:[phone]

                                        [address] [place] [address]                                              LAB             2018

 

             Patient Education: Patient Medication Summary                      

     Completed    2018

 

                          Visit Diagnosis Plan: Epigastric pain Discussion: Cont

inue protonix and carafate

but make into a slurry Flu shot given Discussed EGD if symptoms persist

Follow Up: 2 weeks

                                        ICD-9 : 789.06

ICD-10 : R10.13

                                                    2018

 

                          Visit Diagnosis Plan: Generalized anxiety disorder Dis

cussion: Did discuss 

increased risk of benzodiazepines causing dementia but at this time will stay at
xanax 0.25mg po BID

                                        ICD-9 : 300.00

ICD-10 : F41.1

                                                    2018

 

                                        Appointment: Akanksha Driver

WPtel:[phone]

                                        [address] [place] [address]                                              FOLLOW UP       2018

 

             Patient Education: Patient Medication Summary                      

     Completed    2018

 

                          Visit Diagnosis Plan: Essential (primary) hypertension

 Discussion: Has 

hydralazine to use prn per cardiology Going to do 30 day holter monitor

                                        ICD-9 : 401.9

ICD-10 : I10

                                                    2018

 

                          Visit Diagnosis Plan: Hypoglycemia, unspecified Discus

madeleine: 6 small meals a 

day--each with protein Increase fluid intake

                                        ICD-9 : 251.2

ICD-10 : E16.2

                                                    2018

 

                          Visit Diagnosis Plan: Gastro-esophageal reflux disease

 without esophagitis 

Discussion: Change to protonix 40mg po BID

Follow Up: 1 months

                                        ICD-9 : 530.81

ICD-10 : K21.9

                                                    2018

 

                                        Appointment: Akanksha Driver

WPtel:[phone]

                                        [address] [place] [address]                                              ACUTE ILLNESS   2018

 

             Patient Education: Patient Medication Summary                      

     Completed    2018

 

                          Visit Diagnosis Plan: Dizziness and giddiness Discussi

on: blood work to be 

completed in office including cmp, cbc, troponin to rule out glucose 
intolerance, infection, dehydration. instructed patient that she needs to see 
her cardiologist sooner than oct and patient requested we contact dr. clements's 
office. will have front office make appt. patient has carotid doppler annually.

                                        ICD-9 : 780.4

ICD-10 : R42

                                                    2018

 

                                        Appointment: Nat Lozoya

                                        [address] [place] [address]                                              ACUTE ILLNESS   2018

 

             Patient Education: Patient Medication Summary                      

     Completed    2018

 

                          Visit Diagnosis Plan: Cervicalgia Discussion: Complete

 course of PT since 

symptoms improved Continue stretching exercises Notify if symptoms return or 
worsen

                                        ICD-9 : 723.1

ICD-10 : M54.2

                                                    2018

 

                                        Appointment: Akanksha Driver

WPtel:[phone]

                                        [address] [place], [place] [address]                                              FOLLOW UP       2018

 

             Patient Education: Patient Medication Summary                      

     Completed    2018

 

                          Visit Diagnosis Plan: Hypoglycemia, unspecified Discus

madeleine: Eat something with 

protein every 2 hrs

                                        ICD-9 : 251.2

ICD-10 : E16.2

                                                    2018

 

                          Visit Diagnosis Plan: Other spondylosis with radiculop

athy, cervical region 

Discussion: Check MRI of cervical spine

                                        ICD-9 : 721.0

ICD-10 : M47.22

                                                    2018

 

                          Visit Diagnosis Plan: Vertigo of central origin, bilat

eral Discussion: Discussed

PT for vestibular therapy

                                        ICD-9 : 386.2

ICD-10 : H81.43

                                                    2018

 

                                        Appointment: Akanksha Driver

WPtel:[phone]

                                        [facility]

                                                              2018

 

             Patient Education: Patient Medication Summary                      

     Completed    2018

 

                    Care Plan: MRI NECK SPINE W/O DYE                     LOINC 

: [id number]

                          Pending                   2018

 

                          Visit Diagnosis Plan: Otalgia, bilateral Discussion: k

enalog 40 mg given to 

patient im. instructed patient to monitor blood sugar at home due to risk of 
increased sugar. instructed patient to rtc on wednesday if no improvement and 
may require antibiotic.

                                        ICD-9 : 388.70

ICD-10 : H92.03

                                                    2018

 

                          Visit Diagnosis Plan: Benign paroxysmal vertigo, bilat

eral Discussion: patient 

has meclizine at home but states it makes her too tired. instructed patient to 
cut in half and take at night. if it doesn't cause too much drowsiness, 
instructed to take bid until feeling better. instructed to rtc wed if no 
improvement or worsening symptoms. instructed patient to increase fluid intake 
as well.

                                        ICD-9 : 386.11

ICD-10 : H81.13

                                                    2018

 

                                        Appointment: Nat Lozoya

                                        [address] [place] [address]                                              ACUTE ILLNESS   2018

 

             Patient Education: Patient Medication Summary                      

     Completed    2018

 

                          Visit Diagnosis Plan: Left lower quadrant pain Discuss

ion: Culture urine Notify 

if worsens

                                        ICD-9 : 789.04

ICD-10 : R10.32

                                                    2018

 

                          Visit Diagnosis Plan: Low back pain Discussion: Stretc

hes Topical aspercreme 

Tylenol 1 po TID

                                        ICD-9 : 724.2

ICD-10 : M54.5

                                                    2018

 

                          Visit Diagnosis Plan: Radiculopathy, lumbosacral regio

n Discussion: As above

                                        ICD-9 : 724.4

ICD-10 : M54.17

                                                    2018

 

                                        Appointment: Akanksha Driver

WPtel:[phone]

                                        [address] [place] [address]                                              ACUTE ILLNESS   2018

 

             Patient Education: Patient Medication Summary                      

     Completed    2018

 

                                        Appointment: Akanksha Driver

WPtel:[phone]

                                        [address] [place] [address]

[place]                                              INJECTION       10/06/2017

 

             Patient Education: Patient Medication Summary                      

     Completed    10/06/2017

 

                                        Appointment: Akanksha Driver

WPtel:[phone]

                                        [address] [place] [address]

[place]                                              LAB             2017

 

             Patient Education: Patient Medication Summary                      

     Completed    2017

 

                          Visit Diagnosis Plan: Pain in thoracic spine Discussio

n: PT has helped Continue 

stretches and walking [facility]

                                        ICD-9 : 724.1

ICD-10 : M54.6

                                                    2017

 

                          Visit Diagnosis Plan: Other intervertebral disc degene

ration, lumbar region 

Follow Up: 3 months

                                        ICD-9 : 722.52

ICD-10 : M51.36

                                                    2017

 

                                        Appointment: Akanksha Driver

WPtel:[phone]

                                        [address] [place], [place] [id number]

[place]                                              FOLLOW UP       2017

 

             Patient Education: Patient Medication Summary                      

     Completed    2017

 

                          Visit Diagnosis Plan: Low back pain Discussion: Start 

PT for low back- Seems 

like abdominal pain is radiating from low back

Follow Up: 5 weeks

                                        ICD-9 : 724.2

ICD-10 : M54.5

                                                    2017

 

                          Visit Diagnosis Plan: Left lower quadrant pain Discuss

ion: Had CT scan of 

abdomen/pelvis in 2017 and normal colonoscopy in 2015

                                        ICD-9 : 789.04

ICD-10 : R10.32

                                                    2017

 

                                        Appointment: Akanksha Driver

WPtel:[phone]

                                        [address] [place], [place] [id number]                                              ACUTE ILLNESS   2017

 

             Patient Education: Patient Medication Summary                      

     Completed    2017

 

                                        Appointment: Akanksha Driver

WPtel:[phone]

                                        [address] [place] [address]

[place]                                              LAB             2017

 

             Patient Education: Patient Medication Summary                      

     Completed    2017

 

                          Visit Diagnosis Plan: Mixed hyperlipidemia Discussion:

 Check lipids

                                        ICD-9 : 272.4

ICD-10 : E78.2

                                                    2017

 

                          Visit Diagnosis Plan: Impaired fasting glucose Discuss

ion: Return in AM for CMP,

HbA1C Accuchecks daily Diet/Exercise discussed at length

                                        ICD-9 : 790.29

ICD-10 : R73.01

                                                    2017

 

                          Visit Diagnosis Plan: Other fatigue Discussion: Check 

CBC, TSH

                                        ICD-9 : 780.79

ICD-10 : R53.83

                                                    2017

 

                          Visit Diagnosis Plan: Mastodynia Discussion: Check Jace

ateral diagnostic 

mammogram with US

                                        ICD-9 : 611.71

ICD-10 : N64.4

                                                    2017

 

                                        Appointment: Akanksha Driver

WPtel:[phone]

                                        [address] [place], [place] [id number]              3/7 confirmed `sl                 ACUTE ILLNESS   2017

 

             Patient Education: Patient Medication Summary                      

     Completed    2017

 

                    Care Plan: MAMMOGRAM SCREENING                     LOINC : 2

[id number]

                          Pending                   2017

 

                          Visit Diagnosis Plan: Acute upper respiratory infectio

n, unspecified Discussion:

Exam is reassuring Likely viral illness Supportive care reviewed and encouraged 
Follow up PRN

                                        ICD-9 : 465.9

ICD-10 : J06.9

                                                    2017

 

                                        Appointment: Luba Stevenson 

                                        [address] [place] [address]                                              ACUTE ILLNESS   2017

 

             Patient Education: Patient Medication Summary                      

     Completed    2017

 

                          Visit Plan:               Supportive care. Rest, Fluid

s, Tylenol/Motrin prn fever or 

bodyaches. Notify if worsening symptoms.

                                                            2016

 

                                        Appointment: Akanksha Driver

WPtel:[phone]

                                        [address] [place] [address]                                              ACUTE ILLNESS   2016

 

             Patient Education: Patient Medication Summary                      

     Completed    2016

 

                                        Appointment: Akanksha Driver

WPtel:[phone]

                                        [address] [place], [place] [id number]

[place]                                              INJECTION       10/07/2016

 

             Patient Education: Patient Medication Summary                      

     Completed    10/07/2016

 

                                        Appointment: Akanksha Drivertel:[phone]

                                        [address] [place], [place] [id number]

[place]                                              LAB             2016

 

             Patient Education: Patient Medication Summary                      

     Completed    2016

 

                          Visit Plan:               Add miralax daily Use daily 

probiotic and metamucil and push fluids

Notify if worsens/persists

                                                            2016

 

                                        Appointment: Akanksha Driver

WPtel:[phone]

                                        [address] [place], [place] [id number]              16 confirmed ~sl                 ACUTE ILLNESS   2016

 

             Patient Education: Patient Medication Summary                      

     Completed    2016

 

                          Visit Plan:               No NSAIDs Kidney function di

scussed Discussed hydration Monitor lab

every 4months so will check CMP, HbA1C next month

                                                            2016

 

                                        Appointment: Akanksha Driver

WPtel:[phone]

                                        [address] [place], [place] [address]              03/15 confirmed ~sl                 ACUTE ILLNESS   2016

 

             Patient Education: Patient Medication Summary                      

     Completed    2016

 

                          Visit Plan:               Vestibular exercises Refill 

flonase Strict low Na diet--discussed 

that needs to read labels Rx for walker with chair given due to muscle 
weakness/unsteadiness Has carotids and abdominal aorta and legs checked in 
January Check Chem 7

                                                            2015

 

                                        Appointment: Akanksha Driver

WPtel:[phone]

                                        [address] [place], [place] [id number]              12/15/15 appt confirmed cn                 FOLLOW UP       

 

             Patient Education: Patient Medication Summary                      

     Completed    2015

 

             Patient Education: Vertigo                           Completed    1

2015

 

                                        Appointment: Akanksha Driver

WPtel:[phone]

                                        [address] [place], [place] [id number]

[place]                                              INJECTION       10/16/2015

 

             Patient Education: Patient Medication Summary                      

     Completed    10/16/2015

 

                                        Appointment: Akanksha Driver

WPtel:[phone]

                                        [address] [place], [place] [address]                                              UA              09/15/2015

 

             Patient Education: Patient Medication Summary                      

     Completed    09/15/2015

 

                                        Referral: Landon De La Torre

WPtel:[phone]

#1 [facility]

[address]              Referral                        Completed       2015

 

                                        Appointment: Akanksha Driver

WPtel:[phone]

                                        [address] [place] [address]                                              LAB             09/10/2015

 

             Patient Education: Patient Medication Summary                      

     Completed    09/10/2015

 

                          Visit Plan:               Check CMP, CBC, TSH, Free T4

, B12, Lipids, HbA1C Discussed 6 small 

meals a day each with protein Would likely benefit from antidepressant Update 
colonoscopy

                                                            2015

 

                                        Appointment: Akanksha Driver

WPtel:[phone]

                                        [address] [place], [place] [id number]

              9/8/15 confirmed                 ACUTE ILLNESS   2015

 

             Patient Education: Patient Medication Summary                      

     Completed    2015

 

                          Visit Plan:               Cerumen flush with warm wate

r and peroxide - good results Resume 

Nasonex nasal spray daily Recommended Debrox earwax removal drops

                                                            2015

 

                                        Appointment: Bertha Mon

WPtel:[phone]

                                        [address] [place] [address]                                              ACUTE ILLNESS   2015

 

             Patient Education: Patient Medication Summary                      

     Completed    2015

 

                          Visit Plan:               Continue aspirin daily Add m

eloxicam for 1week Elevate and Ice Call

on 2015

 

                                        Appointment: Akanksha Driver

WPtel:[phone]

                                        [address] [place] [address]                                              ACUTE ILLNESS   2015

 

             Patient Education: Patient Medication Summary                      

     Completed    2015

 

                          Visit Plan:               Been using baclofen at [facility] and has helped some PT for next 

2-4weeks

                                                            2015

 

                                        Appointment: Akanksha Driver

WPtel:[phone]

                                        [address] [place] [address]                                              Hospital Follow Up 2015

 

             Patient Education: Patient Medication Summary                      

     Completed    2015

 

                                        Appointment: Akanksha Driver

WPtel:[phone]

                                        [address] [place] [address]                                              LAB             2015

 

                                        Appointment: Akanksha Driver

WPtel:[phone]

                                        [address] [place] [address]              feeling better, weather -                 FOLLOW UP       

 

                                        Referral: Landon De La Torre

WPtel:[phone]

1 [facility]66[facility]              Referral                        Appointment Requested 2015

 

                          Visit Plan:               Continue exercise and curren

t meds See surgery for removal of right

arm lesion

                                                            2015

 

                                        Appointment: Akanksha Driver

WPtel:[phone]

                                        [address] [place] [address]                                              FOLLOW UP       2015

 

             Patient Education: Patient Medication Summary                      

     Completed    2015

 

                                        Appointment: Akanksha Driver

WPtel:[phone]

                                        [address]

[id number]

[place]                                              INJECTION       10/17/2014

 

             Patient Education: Patient Medication Summary                      

     Completed    10/17/2014

 

                                        Appointment: Bertha Mon

WPtel:[phone]

                                        [address] [place] [address]                                              ACUTE ILLNESS   2014

 

             Patient Education: Patient Medication Summary                      

     Completed    2014

 

                          Visit Plan:               Discussed that likely lumbar

 etiology for leg weakness Will change 

amlodopine to low dose dyazide and see if helps legs and inner ear

                                                            2014

 

                                        Appointment: Akanksha Driver

WPtel:[phone]

                                        [address] [place] [address]                                              FOLLOW UP       2014

 

             Patient Education: Patient Medication Summary                      

     Completed    2014

 

                          Visit Plan:               Ceftin and continue claritin

/flonase Decrease amlodopine to 2.5mg q

HS until fwup with Card due to weakness

                                                            2014

 

                                        Appointment: Akanksha Driver

WPtel:[phone]

                                        [address] [place] [address]                                              ACUTE ILLNESS   2014

 

             Patient Education: Patient Medication Summary                      

     Completed    2014

 

                                        Appointment: Akanksha Driver

WPtel:[phone]

                                        [address] [place] [address]                                              LAB             2014

 

             Patient Education: Patient Medication Summary                      

     Completed    2014

 

                                        Appointment: Bertha Mon

WPtel:[phone]

                                        [address] [place] [address]                                              ACUTE ILLNESS   2014

 

             Patient Education: Patient Medication Summary                      

     Completed    2014

 

                          Visit Plan:               Supportive care. Rest, Fluid

s, Tylenol prn fever or bodyaches. 

Notify if worsening symptoms. Ceftin

                                                            10/15/2013

 

                                        Appointment: Bertha Mon

WPtel:[phone]

                                        [address] [place] [address]                                              ACUTE ILLNESS   10/15/2013

 

             Patient Education: Patient Medication Summary                      

     Completed    10/15/2013

 

                                        Appointment: Akanksha Driver

WPtel:[phone]

                                        [address] [place], [place] [id number] CHECK        2013

 

             Patient Education: Patient Medication Summary                      

     Completed    2013

 

                                        Appointment: Akanksha Driver

WPtel:[phone]

                                        [address] [place], [place] [id number]                                              ER Follow UP    2013

 

             Patient Education: Patient Medication Summary                      

     Completed    2013

 

                          Visit Plan:               Restart flonase BID Use mecl

izine 25mg q HS Vestibular exercises 

Claritin 10mg q AM See ENT if doesn't resolve

                                                            2013

 

                                        Appointment: Akanksha Driver

WPtel:[phone]

                                        [address] [place], [place] [id number]                                              ACUTE ILLNESS   2013

 

             Patient Education: Patient Medication Summary                      

     Completed    2013

 

                          Visit Plan:               Will continue to observe

                                                            2013

 

                                        Appointment: Akanksha Driver

WPtel:[phone]

                                        [address] [place], [place] [id number]                                              FOLLOW UP       2013

 

             Patient Education: Patient Medication Summary                      

     Completed    2013

 

                          Visit Plan:               ERx for Augmentin 875mg q12 

x 10 days and Floxin otic drops 5 gtts 

AD x7days Sample of Nasonex per pt request Discussed treatment (nasal saline, 
salt water gargles, keeping right ear clean and dry, for worsening go to UC on 
weekend, Tylenol/ibuprofen, etc.) RTC 2 weeks for ear recheck.

                                                            05/10/2013

 

                                        Appointment: Jill Jean 

WPtel:[phone]

                                        [address] [place], [place] [id number]                                              ACUTE ILLNESS   05/10/2013

 

             Patient Education: Patient Medication Summary                      

     Completed    05/10/2013

 

                          Visit Plan:               Decrease caffeine intake Marina

ck Bilateral Mammogram with US of left 

breast

                                                            2013

 

                                        Appointment: Akanksha Driver

WPtel:[phone]

                                        [address] [place], [place] [id number]                                              ACUTE ILLNESS   2013

 

             Patient Education: Patient Medication Summary                      

     Completed    2013

 

                                        Appointment: Kate Hall

WPtel:[phone]

                                        [address] [place], [place] [id number]              appt scheduled                  ACUTE ILLNESS   2013

 

                                        Appointment: Akanksha Driver

WPtel:[phone]

                                        [address] [place], [place] [id number]

                                              LAB             2012

 

             Patient Education: Patient Medication Summary                      

     Completed    2012

 

                          Visit Plan:               Continue off carafate and se

e how does Continue probiotic Add 

Vestibular exercises and use meclizine prn Continue Nasonex Check fasting lab 
including CMP, Lipids, CBC, TSH, Free T4, HbA1C

                                                            2012

 

                                        Appointment: Akanksha Driver

WPtel:[phone]

                                        [address] [place], [place] [id number]

                                              FOLLOW UP       2012

 

             Patient Education: Patient Medication Summary                      

     Completed    2012

 

                          Visit Plan:               Continue carafate for 4more 

weeks at current dose then decrease to 

BID for 2wks then q HS

                                                            10/23/2012

 

                                        Appointment: Akanksha Driver

WPtel:[phone]

                                        [address] [place], [place] [id number]

                                              FOLLOW UP       10/23/2012

 

             Patient Education: Patient Medication Summary                      

     Completed    10/23/2012

 

                          Visit Plan:               Continue omeprazole Add Ellyn

fate 1gm po q AC Add daily probiotic

                                                            10/10/2012

 

                                        Appointment: Akanksha Driver

WPtel:[phone]

                                        [address] [place], [place] [id number]

                                              ACUTE ILLNESS   10/10/2012

 

             Patient Education: Patient Medication Summary                      

     Completed    10/10/2012

 

                                        Appointment: Akanksha Driver

WPtel:[phone]

                                        [address] [place], [place] [id number]

[place]                                              INJECTION       2012

 

             Patient Education: Patient Medication Summary                      

     Completed    2012

 

                          Visit Plan:               Diabetic Diet Accuchecks BID

 alternating times Hold onglyza and 

Januvia for now and continue diet and exercise and weight loss and drew LOREDO 
Discussed hypoglycemia and snack of peanut butter and crackers with juice or 
milk

                                                            2012

 

                                        Appointment: Akanksha Driver

WPtel:[phone]

                                        [address] [place], [place] [id number]

                                              WORK IN         2012

 

             Patient Education: Patient Medication Summary                      

     Completed    2012

 

                                        Appointment: Akanksha Driver

WPtel:[phone]

                                        [address] [place] [address]              2012

 

             Patient Education: Patient Medication Summary                      

     Completed    2012

 

                                        Appointment: Akanksha Driver

WPtel:[phone]

                                        [address] [place] [address]             2012

 

             Patient Education: Patient Medication Summary                      

     Completed    2012

 

                                        Appointment: Akanksha Driver

WPtel:[phone]

                                        [address] [place] [address]                                              ACUTE ILLNESS   2012

 

             Patient Education: Patient Medication Summary                      

     Completed    2012

 

                          Visit Plan:               Injection to Right SI joint 

as above Pt will call in 3 days on pain

Has PT starting in 2wks.

                                                            2012

 

                                        Appointment: Akanksha Driver

WPtel:[phone]

                                        [address] [place] [address]                                              ACUTE ILLNESS   2012

 

             Patient Education: Patient Medication Summary                      

     Completed    2012

 

                          Visit Plan:               OMT done Start PT May need u

pdated MRI if need to consider epidural

Prednisone

                                                            2012

 

                                        Appointment: Akanksha Driver

WPtel:[phone]

                                        [address] [place] [address]                                              OMT             2012

 

             Patient Education: Patient Medication Summary                      

     Completed    2012

 

                          Visit Plan:               Flexeril and Vimovo OMT done

 Daily stretches

                                                            2012

 

                                        Appointment: Akanksha Driver

WPtel:[phone]

                                        [address] [place] [address]                                              ACUTE ILLNESS   2012

 

             Patient Education: Patient Medication Summary                      

     Completed    2012

 

                          Visit Plan:               OMT done Daily stretches Vinod

st heat or biofreeze Right SI joint 

injection Call in 1week Vimovo BID

                                                            2012

 

                                        Appointment: Akanksha Driver

WPtel:[phone]

                                        [address] [place] [address]                                              ACUTE ILLNESS   2012

 

             Patient Education: Patient Medication Summary                      

     Completed    2012

 

                                        Appointment: Akanksha Drivertel:[phone]

                                        [address] [place], [place] [id number]

                                              LAB             2012

 

             Patient Education: Patient Medication Summary                      

     Completed    2012

 

                          Visit Plan:               Decrease amlodopine to 1.25m

g daily Schedule with Cardiology 

Fasting lab in AM

                                                            2012

 

                                        Appointment: Akanksha Driver

WPtel:[phone]

                                        [address] [place], [place] [id number]                                              ACUTE ILLNESS   2012

 

             Patient Education: Patient Medication Summary                      

     Completed    2012

 

                          Visit Plan:               Saline nasal flushes prn. Ty

lenol/Motrin prn headache. Notify if 

persists/symptoms worsening. Cerumen removal from ears as above

                                                            2011

 

                                        Appointment: Akanksha Driver

WPtel:[phone]

                                        [address] [place] [address]                                              ACUTE ILLNESS   2011

 

             Patient Education: Patient Medication Summary                      

     Completed    2011

 

                                        Appointment: Akanksha Driver

WPtel:[phone]

                                        [address] [place] [address]

[place]                                              INJECTION       10/05/2011

 

             Patient Education: Patient Medication Summary                      

     Completed    10/05/2011

 

                          Visit Plan:               Continue vimovo for 1more we

ek Increase Omeprazole to BID for 1mo 

then resume QD if stomach improved Vestibular exercises with meclizine q HS for 
next week

                                                            2011

 

                                        Appointment: Akanksha Driver

WPtel:[phone]

                                        [address] [place], [place] [id number]

                                              FOLLOW UP       2011

 

             Patient Education: Patient Medication Summary                      

     Completed    2011

 

                                        Appointment: Akanksha Drvier

WPtel:[phone]

                                        [address] [place] [address]                                              ACUTE ILLNESS   2011

 

             Patient Education: Patient Medication Summary                      

     Completed    2011

 

                                        Appointment: Akanksha Driver

WPtel:[phone]

                                        [address]                                              LAB             2011

 

             Patient Education: Patient Medication Summary                      

     Completed    2011

 

                          Visit Plan:               Saline nasal flushes prn. Ty

lenol/Motrin prn headache. Notify if 

persists/symptoms worsening. Add Veramyst Increase Omeprazole to 20mg po BID

                                                            2011

 

                                        Appointment: Akanksha Driver

WPtel:[phone]

                                        [address] [place] [address]                                              ACUTE ILLNESS   2011

 

             Patient Education: Patient Medication Summary                      

     Completed    2011

 

                                        Appointment: Akanksha Driver

WPtel:[phone]

                                        [address] [place] [address]                                              FOLLOW UP       2011

 

             Patient Education: Patient Medication Summary                      

     Completed    2011

 

                                        Appointment: Akanksha Driver

WPtel:[phone]

                                        [address] [place] [address]                                              ACUTE ILLNESS   2011

 

             Patient Education: Patient Medication Summary                      

     Completed    2011

 

                          Visit Plan:               Nasocourt sample given. Pt. 

will notify if symptoms are worse on 

Monday.

                                                            2010

 

                                        Appointment: Kate Hall

WPtel:[phone]

                                        [address] [place] [address]                                              ACUTE ILLNESS   2010

 

             Patient Education: Patient Medication Summary                      

     Completed    2010

 

                                        Appointment: Akanksha Driver

WPtel:[phone]

                                        [address] [place], [place] [address]

[place]                                              INJECTION       10/06/2010

 

             Patient Education: Patient Medication Summary                      

     Completed    10/06/2010

 

                                        Appointment: Akanksha Driver

WPtel:[phone]

                                        [address] [place] [address]                                              FOLLOW UP       2010

 

             Patient Education: Patient Medication Summary                      

     Completed    2010

 

             Patient Education: Lexapro                           Completed    0

2010

 

                          Visit Plan:               May proceed with hiatal mykel

ia repair per Card. so will contact Dr. Cash's office to proceed with surgery Trial of Lexapro 5mg QD plus use 
xanax prn

                                                            2010

 

                                        Appointment: Akanksha Driver

WPtel:[phone]

                                        [address] [place] [address]                                              FOLLOW UP       2010

 

             Patient Education: Patient Medication Summary                      

     Completed    2010

 

                          Visit Plan:               B12 given Cont oral B12 and 

iron Fwup 1mo for B12 Proceed with 

hiatal hernia repair once card clearance

                                                            2010

 

                                        Appointment: Akanksha Driver

WPtel:[phone]

                                        [address] [place] [address]                                              FOLLOW UP       2010

 

             Patient Education: Patient Medication Summary                      

     Completed    2010

 

                                        Appointment: Akanksha Driver

WPtel:[phone]

                                        [address] [place] [address]                                              FOLLOW UP       2010

 

             Patient Education: Patient Medication Summary                      

     Completed    2010

 

                                        Appointment: Akanksha Driver

WPtel:[phone]

                                        [address] [place] [address]

[place]                                              LAB             2010

 

             Patient Education: Patient Medication Summary                      

     Completed    2010

 

                          Visit Plan:               Check fasting lab in AM--CMP

,Lipids, CBC, Vit D, TSH,FreeT4, B12

                                                            2010

 

                                        Appointment: Akanksha Driver

WPtel:[phone]

                                        [address] [place] [address]                                              FOLLOW UP       2010

 

             Patient Education: Patient Medication Summary                      

     Completed    2010

 

                                        Referral: Landon De La Torre

WPtel:[phone]

[address] [place] [address]

[place]              Referral                        Appointment Requested  

 

                                        Referral: Landon De La Torre

WPtel:[phone]

#[address]              Referral                        Initiated        







Instructions





                                        Comment

 

                                        . Supportive care.  Rest, Fluids, Tyleno

l/Motrin prn fever or bodyaches.  Notify

if worsening symptoms.



 

                                        . Add miralax daily

Use daily probiotic and metamucil and push fluids

Notify if worsens/persists

 

                                        . No NSAIDs

Kidney function discussed

Discussed hydration 

Monitor lab every 4months so will check CMP, HbA1C next month

 

                                        . Vestibular exercises

Refill flonase

Strict low Na diet--discussed that needs to read labels

Rx for walker with chair given due to muscle weakness/unsteadiness

Has carotids and abdominal aorta and legs checked in January

Check Chem 7



 

                                        . Check CMP, CBC, TSH, Free T4, B12, Lip

ids, HbA1C

Discussed 6 small meals a day each with protein

Would likely benefit from antidepressant 

Update colonoscopy

 

                                        . Cerumen flush with warm water and tyler

xide - good results 

Resume Nasonex nasal spray daily

Recommended Debrox earwax removal drops 

 

                                        . Continue aspirin daily

Add meloxicam for 1week

Elevate and Ice

Call on Monday

 

                                        . Been using baclofen at bedtime and has

 helped some

PT for next 2-4weeks



 

                                        . Continue exercise and current meds

See surgery for removal of right arm lesion

 

                                        . Discussed that likely lumbar etiology 

for leg weakness

Will change amlodopine to low dose dyazide and see if helps legs and inner ear

 

                                        . Ceftin and continue claritin/flonase

Decrease amlodopine to 2.5mg q HS until fwup with Card due to weakness

 

                                        .  Supportive care.  Rest, Fluids, Tylen

ol prn fever or bodyaches.  Notify if 

worsening symptoms.

Ceftin

 

                                        . Restart flonase BID

Use meclizine 25mg q HS

Vestibular exercises

Claritin 10mg q AM

See ENT if doesn't resolve

 

                                        . Will continue to observe



 

                                        . ERx for Augmentin 875mg q12 x 10 days 

and Floxin otic drops 5 gtts AD x7days

Sample of Nasonex per pt request

Discussed treatment (nasal saline, salt water gargles, keeping right ear clean 
and dry, for worsening go to UC on weekend, Tylenol/ibuprofen, etc.)

RTC 2 weeks for ear recheck.

 

                                        . Decrease caffeine intake

Check Bilateral Mammogram with US of left breast

 

                                        Left ear flushed with warm water and per

oxide with ear syringe and then last 

removed with currette--peroxide drops instilled.  Tolerated well, no 
complications, TM intact.. Continue off carafate and see how does

Continue probiotic

Add Vestibular exercises and use meclizine prn

Continue Nasonex

Check fasting lab including CMP, Lipids, CBC, TSH, Free T4, HbA1C

 

                                        . Continue carafate for 4more weeks at c

urrent dose then decrease to BID for 

2wks then q HS

 

                                        . Continue omeprazole

Add Carafate 1gm po q AC

Add daily probiotic



 

                                        . Diabetic Diet

Accuchecks BID alternating times

Hold onglyza and Januvia for now and continue diet and exercise and weight loss 
and moniter BS

Discussed hypoglycemia and snack of peanut butter and crackers with juice or 
milk

 

                                        Informed Consent obtainded.  Right SI yasir

int cleansed with alcohol and betadine 

and injected with 3cc 1%l lidocaine with 40mg depomedrol and 40mg kenalog, 
tolerated well with no complications, neosporin and bandage applied. Injection 
to Right SI joint as above

Pt will call in 3 days on pain

Has PT starting in 2wks.

 

                                        . OMT done

Start PT

May need updated MRI if need to consider epidural

Prednisone

 

                                        . Flexeril and Vimovo

OMT done

Daily stretches

 

                                        Right SI joint cleansed with alchohol an

d betadine and injected with 3cc 1% 

lidocaine with 40mg depomedrol and 40mg kenalog, tolerated well with no 
complications, neosporin and bandage applied. OMT done

Daily stretches

Moist heat or biofreeze

Right SI joint injection

Call in 1week

Vimovo BID

 

                                        . Decrease amlodopine to 1.25mg daily

Schedule with Cardiology

Fasting lab in AM

 

                                        .  Saline nasal flushes prn. Tylenol/Mot

rin prn headache.  Notify if 

persists/symptoms worsening.

Cerumen removal from ears as above



 

                                        . Continue vimovo for 1more week

Increase Omeprazole to BID for 1mo then resume QD if stomach improved

Vestibular exercises with meclizine q HS for next week

 

                                        . Saline nasal flushes prn. Tylenol/Motr

in prn headache.  Notify if 

persists/symptoms worsening.

Add Veramyst

Increase Omeprazole to 20mg po BID

 

                                        . Nasocourt sample given.  Pt. will noti

fy if symptoms are worse on Monday. 

 

                                        . May proceed with hiatal hernia repair 

per Card. so will contact Dr. Cash's office to proceed with surgery



Trial of Lexapro 5mg QD plus use xanax prn

 

                                        . B12 given

Cont oral B12 and iron

Fwup 1mo for B12

Proceed with hiatal hernia repair once card clearance

 

                                        . Check fasting lab in AM--CMP,Lipids, C

BC, Vit D, TSH,FreeT4, B12







Medical Equipment

No Medical Equipment data



Health Concerns Section

Health Concerns data not found



Goals Section

Goals data not found



Interventions Section

Interventions data not found



Health Status Evaluations/Outcomes Section

Health Status Evaluations/Outcomes data not found



Advance Directives

No Advance Directive data

## 2020-02-19 NOTE — XMS REPORT
CCD document using C-CDA

                             Created on: 2020



Leilani Ramírez

External Reference #: [id number]

: 1940

Sex: Female



Demographics





                          Address                   [address] [place]  [address]

 

                          Home Phone                [phone]

 

                          Preferred Language        Unknown

 

                          Marital Status            

 

                          Baptism Affiliation     Unknown

 

                          Race                      White

 

                          Ethnic Group              Not  or 





Author





                          Author                    Leilani Driver D.O.

 

                          Organization              [facility]

 

                          Address                   [address] [place]  [address]



 

                          Phone                     [phone]







Care Team Providers





                    Care Team Member Name Role                Phone

 

                    Akanksha Driver D.O. PP                  Unavailable

 

                          CCM                       Unavailable







Summary Purpose

Interface Exchange



Insurance Providers





             Payer name   Policy type / Coverage type Covered party ID Effective

 Begin Date 

Effective End Date

 

             [employer] [place] [id number]  2018   

  Unknown

 

             [employer] Senior Supplemental Medicare Part B [id number]   [id number]    

 Unknown







Family history





Father



                          Diagnosis                 Age At Onset

 

                          Heart disease             Unknown







Mother



                          Diagnosis                 Age At Onset

 

                          Heart disease             Unknown

 

                          Cancer                    Unknown







Social History





                Social History Element Codes           Description     Effective

 Dates

 

                Tobacco history SNOMED CT: 467422122 Never smoker    2011

 

                Marital status  Unknown         Single          2010

 

                Number of children Unknown         9               2010







Allergies, Adverse Reactions, Alerts





           Substance  Reaction   Codes      Entered Date Inactivated Date Status

 

           _                     Unknown    2019 No Inactive Date Active

 

           * NO KNOWN FOOD ALLERGIES            Unknown    2010 No Inactiv

e Date Active

 

           _                     Unknown    2010 No Inactive Date Active

 

           QUINIDINE-QUININE ANALOGUES hives,     Unknown    2010 No Inact

diamante Date Active







Problems





                Condition       Codes           Effective Dates Condition Status

 

                          Essential (primary) hypertension ICD-9: 401.9

ICD-10: I10               2014                Active

 

                          Palpitations              ICD-9: 785.1

ICD-10: R00.2             10/02/2018                Active

 

                          Stress reaction           ICD-9: 308.9

ICD-10: F43.0             2020                Active

 

                          Mixed hyperlipidemia      ICD-9: 272.4

ICD-10: E78.2             2019                Active

 

                          FLU VACCINE               ICD-9: V04.81

ICD-10: Z23               2012                Active

 

                          Acute serous otitis media, left ear ICD-9: 381.01

ICD-10: H65.02            2019                Active

 

                          Coronary atherosclerosis due to calcified coronary les

ion ICD-9: 414.00

ICD-10: I25.84            2018                Active

 

                          Hypoglycemia, unspecified ICD-9: 251.2

ICD-10: E16.2             2017                Active

 

                          Other fatigue             ICD-9: 780.79

ICD-10: R53.83            2017                Active

 

                          Urinary tract infection, site not specified ICD-9: 599

.0

ICD-10: N39.0             10/02/2018                Active

 

                          Gastro-esophageal reflux disease without esophagitis I

CD-9: 530.81

ICD-10: K21.9             2018                Active

 

                          Epigastric pain           ICD-9: 789.06

ICD-10: R10.13            2018                Active

 

                          Atherosclerotic heart disease of native coronary arter

y without angina pectoris 

ICD-9: 414.00

ICD-10: I25.10            2018                Active

 

                          Generalized anxiety disorder ICD-9: 300.00

ICD-10: F41.1             2018                Active

 

                          Dizziness and giddiness   ICD-9: 780.4

ICD-10: R42               2014                Active

 

                          Occlusion and stenosis of bilateral carotid arteries I

CD-9: 433.10

ICD-10: I65.23            2018                Active

 

                          Cervicalgia               ICD-9: 723.1

ICD-10: M54.2             2018                Active

 

                          Other spondylosis with radiculopathy, cervical region 

ICD-9: 721.0

ICD-10: M47.22            2018                Active

 

                          Cervicocranial syndrome   ICD-9: 723.2

ICD-10: M53.0             2018                Active

 

                          Vertigo of central origin, bilateral ICD-9: 386.2

ICD-10: H81.43            2018                Active

 

                          Benign paroxysmal vertigo, bilateral ICD-9: 386.11

ICD-10: H81.13            2018                Active

 

                          Otalgia, bilateral        ICD-9: 388.70

ICD-10: H92.03            2018                Active

 

                          Left lower quadrant pain  ICD-9: 789.04

ICD-10: R10.32            2017                Active

 

                          Low back pain             ICD-9: 724.2

ICD-10: M54.5             2017                Active

 

                          Radiculopathy, lumbosacral region ICD-9: 724.4

ICD-10: M54.17            2018                Active

 

                          Impaired fasting glucose  ICD-9: 790.29

ICD-10: R73.01            2012                Active

 

                          Other intervertebral disc degeneration, lumbar region 

ICD-9: 722.52

ICD-10: M51.36            2017                Active

 

                          Pain in thoracic spine    ICD-9: 724.1

ICD-10: M54.6             2017                Active

 

                          Mastodynia                ICD-9: 611.71

ICD-10: N64.4             2017                Active

 

                          Acute upper respiratory infection, unspecified ICD-9: 

465.9

ICD-10: J06.9             2017                Active

 

                          Acute mastoiditis without complications, right ear ICD

-9: 383.00

ICD-10: H70.001           2016                Active

 

                          Constipation, unspecified ICD-9: 564.00

ICD-10: K59.00            2016                Active

 

                          Chronic kidney disease, stage 1 ICD-9: 585.1

ICD-10: N18.1             03/15/2016                Active

 

                          History of falling        ICD-9: V15.88

ICD-10: Z91.81            12/15/2015                Active

 

                          Muscle weakness (generalized) ICD-9: 780.79

ICD-10: M62.81            2015                Active

 

                Acute renal failure ICD-9: 584.9    2015      Active

 

                POLYURIA        ICD-9: 788.42   2015      Active

 

                CAD             ICD-9: 414.00   09/10/2015      Active

 

                Hyperglycemia   ICD-9: 790.29   2012      Active

 

                HYPERLIPIDEMIA NEC/NOS ICD-9: 272.4    09/10/2015      Active

 

                ABDOMINAL PAIN  ICD-9: 789.00   10/10/2012      Active

 

                Grieving        ICD-9: 309.0    2015      Active

 

                HYPOGLYCEMIA    ICD-9: 251.2    2012      Active

 

                MALAISE AND FATIGUE ICD-9: 780.79   2015      Active

 

                CERUMEN IMPACTION ICD-9: 380.4    2015      Active

 

                Leg pain        ICD-9: 729.5    2015      Active

 

                SUPERFIC PHLEBITIS-LEG ICD-9: 451.0    2015      Active

 

                SPASM OF MUSCLE ICD-9: 728.85   2015      Active

 

                Thoracic back pain ICD-9: 724.1    2015      Active

 

                Benign positional vertigo ICD-9: 386.11   2015      Active

 

                Suspicious nevus ICD-9: 238.2    2015      Active

 

                EUSTACHIAN TUBE DYSFUNCTION ICD-9: 381.81   2014      Acti

ve

 

                SINUSITIS, ACUTE ICD-9: 461.9    2014      Active

 

                DIZZINESS/VERTIGO ICD-9: 780.4    2014      Active

 

                HYPERTENSION    ICD-9: 401.9    2014      Active

 

                URI, ACUTE      ICD-9: 465.9    10/15/2013      Active

 

                CEPHALGIA       ICD-9: 784.0    2013      Active

 

                OTITIS MEDIA NOS ICD-9: 382.9    2013      Active

 

                Perforation of ear drum ICD-9: 384.20   05/10/2013      Active

 

                Mastalgia       ICD-9: 611.71   2013      Active

 

                DYSPEPSIA       ICD-9: 536.8    10/10/2012      Active

 

                IBS             ICD-9: 564.1    10/10/2012      Active

 

                FLU VACCINE     ICD-9: V04.81   2012      Active

 

                Radiculopathy of leg ICD-9: 724.4    2012      Active

 

                SCIATICA        ICD-9: 724.3    2012      Active

 

                Lumbar degenerative disc disease ICD-9: 722.52   2012     

 Active

 

                Sacroiliac dysfunction ICD-9: 739.4    2012      Active

 

                Hypertension    Unknown         2012      Active

 

                PAIN, LOWER BACK ICD-9: 724.2    2011      Active

 

                SPINAL ENTHESOPATHY ICD-9: 720.1    2011      Active

 

                Hypotension     ICD-9: 458.9    2011      Active

 

                SACROILIITIS NEC ICD-9: 720.2    2011      Active

 

                PHARYNGITIS, ACUTE ICD-9: 462      2010      Active

 

                URINARY TRACT INFECTION ICD-9: 599.0    2010      Active

 

                Heat exhaustion ICD-9: 992.5    2010      Active

 

                Esophagitis     ICD-9: 530.10   2010      Active

 

                Gastritis       ICD-9: 535.50   2010      Active

 

                GERD            ICD-9: 530.81   2010      Active

 

                Hiatal hernia   ICD-9: 553.3    2010      Active

 

                B12 DEFIC ANEMIA NEC ICD-9: 281.1    2010      Active

 

                Anxiety         Unknown         2010      Active

 

                Heart disease   Unknown         2010      Active

 

                Hyperlipidemia  Unknown         2010      Active

 

                ANXIETY STATE NOS ICD-9: 300.00   2010      Active

 

                DEPRESSIVE DISORDER NEC ICD-9: 311      2010      Active







Medications





          Medication Codes     Instructions Start Date Stop Date Status    Fill 

Instructions

 

                    metoprolol tartrate 25 mg tablet RxNorm: 866865      1 Table

t(s) Oral QAM and two 

tablets (50mg) in the evening 2020      Active           

 

                    Flonase Allergy Relief 50 mcg/actuation nasal spray,suspensi

on RxNorm: 4613336     2

Spray Nasal every night at bedtime 2020      Inactive     

    

 

           simvastatin 40 mg tablet RxNorm: 210475 1 Tablet(s) PO QD 2019 

02/15/2020 

Active                                   

 

                omeprazole 40 mg capsule,delayed release RxNorm: 424607  1 Capsu

le(s) Oral QD 

2019          Active               

 

                Xanax 0.25 mg tablet RxNorm: 851551  TAKE 1 TABLET BY MOUTH TWIC

E DAILY 

10/29/2019          No Stop Date        Active               

 

                omeprazole 40 mg capsule,delayed release RxNorm: 844746  1 Capsu

le(s) PO QD 

10/07/2019          2019          Inactive             

 

             metoprolol tartrate 25 mg tablet RxNorm: 203359 1 Tablet(s) PO BID 

2019                Inactive                   

 

                omeprazole 40 mg capsule,delayed release RxNorm: 104273  1 Capsu

le(s) PO QD 

2019          10/06/2019          Inactive             

 

                    Flonase Allergy Relief 50 mcg/actuation nasal spray,suspensi

on RxNorm: 9893946     2

[place] NASAL QHS 2019      Inactive         

 

           simvastatin 40 mg tablet RxNorm: 375508 1 Tablet(s) PO QD 2019 

Inactive                                 

 

           simvastatin 40 mg tablet RxNorm: 605871 1 Tablet(s) PO QD 2019 

Inactive                                 

 

                omeprazole 40 mg capsule,delayed release RxNorm: 943411  1 Capsu

le(s) PO QD 

2019          Inactive             

 

           simvastatin 40 mg tablet RxNorm: 496559 1 Tablet(s) PO QD 2019 

Inactive                                 

 

                omeprazole 40 mg capsule,delayed release RxNorm: 176010  1 Capsu

le(s) PO QD 

2019          Inactive             

 

             Bactrim  mg-160 mg tablet RxNorm: 712349 1 Tablet(s) PO BID 0

3/08/2019   

2019                Inactive                   

 

             Bactrim  mg-160 mg tablet RxNorm: 552809 1 Tablet(s) PO BID 0

3/08/2019   

2019                Inactive                   

 

             Macrobid 100 mg capsule RxNorm: 054757 1 Capsule(s) PO BID 20

                          Inactive                   

 

             metoprolol tartrate 25 mg tablet RxNorm: 258783 1 Tablet(s) PO BID 

2019                Inactive                   

 

                Xanax 0.25 mg tablet RxNorm: 014054  TAKE 1 TABLET BY MOUTH TWIC

E DAILY 

2018          10/28/2019          Inactive             

 

           Xanax 0.25 mg tablet RxNorm: 974345 1 Tablet(s) PO BID 2018 

Inactive                                 

 

                    Protonix 40 mg tablet,delayed release RxNorm: 355843      1 

Tablet(s) PO BID for 

stomach--replaces omeprazole 2018      Inactive         

 

             Carafate 1 gram tablet RxNorm: 045769 1 Tablet(s) PO AC & HS 2019                Inactive                   

 

           Xanax 0.25 mg tablet RxNorm: 593182 1 Tablet(s) PO BID 10/30/2018 12/

10/2018 

Inactive                                 

 

             Bactrim  mg-160 mg tablet RxNorm: 885348 1 Tablet(s) PO BID 1

   

10/08/2018                Inactive                   

 

             Bactrim  mg-160 mg tablet RxNorm: 915986 1 Tablet(s) PO BID 1

   

10/03/2018                Inactive                   

 

             Carafate 1 gram tablet RxNorm: 489032 1 Tablet(s) PO AC & HS 09/18/

2018   

10/17/2018                Inactive                   

 

                    Protonix 40 mg tablet,delayed release RxNorm: 490536      1 

Tablet(s) PO BID for 

stomach--replaces omeprazole 2018      Inactive         

 

                    Protonix 40 mg tablet,delayed release RxNorm: 254051      1 

Tablet(s) PO BID for 

stomach--replaces omeprazole 2018      Inactive         

 

                    fluticasone 50 mcg/actuation nasal spray,suspension RxNorm: 

3232115     2 Spray 

NASAL QD to each nostril 2018      Inactive         

 

             Nasonex 50 mcg/actuation Spray RxNorm: 6620648 2 [place] NASAL 2018                Inactive                   

 

                omeprazole 40 mg capsule,delayed release RxNorm: 307950  1 Capsu

le(s) PO QD 

2018          08/15/2018          Inactive             

 

                    simvastatin 40 mg tablet RxNorm: 395576      1 Tablet(s) PO 

QD TAKE 1 TABLET EVERY 

DAY             2018      Inactive         

 

             metoprolol tartrate 25 mg tablet RxNorm: 926414 1/2 Tablet(s) PO QD

 2018                Inactive                   

 

                simvastatin 40 mg tablet RxNorm: 888459  Tablet(s) TAKE 1 TABLET

 EVERY DAY 

2017          Inactive             

 

             metoprolol tartrate 25 mg tablet RxNorm: 139743 1/2 Tablet(s) PO QD

 2017                Inactive                   

 

                    Flonase 50 mcg/actuation nasal spray,suspension RxNorm: 1797

933     2 [place] NASAL 

BID             2017      Inactive         

 

                omeprazole 40 mg capsule,delayed release RxNorm: 096615  1 Capsu

le(s) PO QD 

2017          Inactive             

 

             metoprolol tartrate 25 mg tablet RxNorm: 699524 1/2 Tablet(s) PO QD

 04/10/2017   

2017                Inactive                   

 

                    ciprofloxacin 0.2 % ear drops in a dropperette RxNorm: 41895

6      4 Drop(s) OTIC TID

for 1 week      2016      Inactive         

 

           cefdinir 300 mg capsule RxNorm: 633659 2 Capsule(s) PO QD 2016 

Inactive                                 

 

                    omeprazole 40 mg capsule,delayed release RxNorm: 917791     

 TAKE 1 CAPSULE EVERY DAY

                2016      Inactive         

 

             simvastatin 40 mg tablet RxNorm: 055590 TAKE 1 TABLET EVERY DAY 2017                Inactive                   

 

                    Flonase 50 mcg/actuation nasal spray,suspension RxNorm: 1797

933     2 [place] NASAL 

BID             2015      Inactive         

 

             cefuroxime axetil 250 mg tablet RxNorm: 293059 1 Tablet(s) PO BID 0

2015                Inactive                   

 

             cefuroxime axetil 250 mg tablet RxNorm: 980151 1 Tablet(s) PO BID 0

2015                Inactive                   

 

                omeprazole 40 mg capsule,delayed release RxNorm: 508585  1 Capsu

le(s) PO QD 

2015          Inactive             

 

           meloxicam 7.5 mg tablet RxNorm: 766923 1 Tablet(s) PO QD 2015 0

2015 

Inactive                                 

 

                loratadine 10 mg tablet RxNorm: 771304  1 Tablet(s) PO QAM for a

llergies 

2014          Inactive             

 

                    Flonase 50 mcg/actuation nasal spray,suspension RxNorm: 8963

23      2 [place] NASAL BID

                2014      12/15/2015      Inactive         

 

           prednisone 20 mg tablet RxNorm: 921911 2 Tablet(s) PO BID 2014 

Inactive                                 

 

             Dyazide 37.5 mg-25 mg capsule RxNorm: 752442 1 Capsule(s) PO QAM 2014                Inactive                   

 

           Ceftin 500 mg tablet RxNorm: 749249 1 Tablet(s) PO BID 2014 

Inactive                                 

 

           Ceftin 500 mg tablet RxNorm: 285383 1 Tablet(s) PO BID 2014 

Inactive                                 

 

                    simvastatin 40 mg tablet RxNorm: 304721      Tablet(s) PO TA

KE ONE TABLET BY MOUTH 

EVERY DAY       2014      Inactive         

 

                    metoprolol tartrate 25 mg tablet RxNorm: 455276      Tablet(

s) PO TAKE ONE-HALF 

TABLET BY MOUTH EVERY DAY 2014      Inactive         

 

           Ceftin 500 mg tablet RxNorm: 068610 1 Tablet(s) PO BID 10/15/2013 10/

 

Inactive                                 

 

                loratadine 10 mg tablet RxNorm: 886928  1 Tablet(s) PO QAM for a

llergies 

2013          Inactive             

 

                    omeprazole 20 mg capsule,delayed release RxNorm: 833636     

 Capsule(s) PO TAKE ONE 

CAPSULE BY MOUTH TWICE DAILY 2013      Inactive         

 

                omeprazole 20 mg capsule,delayed release RxNorm: 853293  1 Capsu

le(s) PO BID 

2013          Inactive             

 

             Augmentin 875 mg-125 mg tablet RxNorm: 743615 1 Tablet(s) PO Q12H 0

5/10/2013   

2013                Inactive                   

 

             Floxin Otic Drops 1 bottle Drops RxNorm:      5 Drop(s) OTIC BID 05

/10/2013   

2013                Inactive                   

 

                    metoprolol tartrate 25 mg tablet RxNorm: 977740      Tablet(

s) PO TAKE ONE-HALF 

TABLET BY MOUTH EVERY DAY 2013      Inactive         

 

                    simvastatin 40 mg tablet RxNorm: 796100      Tablet(s) PO TA

KE ONE TABLET BY MOUTH 

EVERY DAY       2013      Inactive         

 

                lancets         RxNorm:         Misc Miscellaneous USE ONE TO CH

YOGI GLUCOSE EVERY DAY 

2013          Inactive             

 

             Carafate 1 gram tablet RxNorm: 665941 1 Tablet(s) PO AC & HS 2015                Inactive                   

 

           Flagyl 500 mg tablet RxNorm: 572535 1 Tablet(s) PO TID 10/10/2012 10/

 

Inactive                                 

 

             Carafate 1 gram tablet RxNorm: 051379 1 Tablet(s) PO AC & HS 10/10/

2012   

2012                Inactive                   

 

          One Touch Test strips RxNorm:   Miscellaneous QD 2012

 Inactive  

one touch ultra mini test strips

 

           Protonix 40 mg Tab RxNorm: 474300 1 Tablet(s) PO QD 2012 

Inactive                                 

 

           Protonix 40 mg Tab RxNorm: 506079 1 Tablet(s) PO QD 2012 10/09/

2012 

Inactive                                 

 

           prednisone 20 mg Tab RxNorm: 666676 1 Tablet(s) PO BID 2012 

Inactive                                 

 

             Flexeril 5 mg Tab RxNorm: 233476 1 Tablet(s) PO QHS for spasm 2012                Inactive                   

 

             Flexeril 5 mg Tab RxNorm: 270240 1 Tablet(s) PO QHS for spasm 2012                Inactive                   

 

                amlodipine 2.5 mg Tab RxNorm: 998544  1/2 Tablet(s) PO QD replac

es 5mg dose 

2012          Inactive             

 

           simvastatin 40 mg tablet RxNorm: 890535 1 Tablet(s) PO QD 2012 

Inactive                                 

 

             metoprolol tartrate 25 mg tablet RxNorm: 762064 1/2 Tablet(s) PO QD

 2012                Inactive                   

 

                omeprazole 20 mg capsule,delayed release RxNorm: 708635  1 Capsu

le(s) PO BID 

2012          Inactive             

 

           cefdinir 300 mg Cap RxNorm: 425843 2 Capsule(s) PO QD 2011 

Inactive                                 

 

           simvastatin 40 mg Tab RxNorm: 793454 1 Tablet(s) PO QD 2011 

Inactive                                 

 

             metoprolol tartrate 25 mg Tab RxNorm: 175879 1/2 Tablet(s) PO QD 10

/   

2012                Inactive                   

 

             metoprolol tartrate 25 mg Tab RxNorm: 352086 1/2 Tablet(s) PO QD 10

/   

10/24/2011                Inactive                   

 

           meclizine 25 mg Tab RxNorm: 6572595 1 Tablet(s) PO QHS 2011 

Inactive                                for dizziness

 

           Ceftin 500 mg Tab RxNorm: 601324 1 Tablet(s) PO BID 2011 

Inactive                                 

 

                omeprazole 20 mg Cap, Delayed Release RxNorm: 202341  1 Capsule(

s) PO BID 

2011          10/24/2011          Inactive             

 

           simvastatin 40 mg Tab RxNorm: 601149 1 Tablet(s) PO QD 2011 

Inactive                                 

 

           simvastatin 40 mg Tab RxNorm: 792823 1 Tablet(s) PO QD 2011 

Inactive                                 

 

           simvastatin 40 mg Tab RxNorm: 865696 1 Tablet(s) PO QD 2010 

Inactive                                 

 

             Tessalon Perles 100 mg Cap RxNorm: 193829 1 Capsule(s) PO Q6-8H 2010                Inactive                   

 

           cefdinir 300 mg Cap RxNorm: 573819 1 Capsule(s) PO BID 2010 

Inactive                                 

 

           Lexapro 10 mg Tab RxNorm: 264677 1 Tablet(s) PO QD 2010

010 

Inactive                                 

 

           Cipro 250 mg Tab RxNorm: 999642 1 Tablet(s) PO BID 2010 10/04/2

010 

Inactive                                 

 

             Wellbutrin  mg 24 hr Tab RxNorm: 833794 1 Tablet(s) PO QAM 2010                Inactive                   

 

          Fish Oil 1,000 mg Cap RxNorm:   1 Capsule(s) PO QD No Start Date      

     Active     

 

                Tylenol Extra Strength 500 mg tablet RxNorm: 431096  1/2 Tablet(

s) PO as needed 

No Start Date                           Active               

 

                nitroglycerin 0.4 mg sublingual tablet RxNorm: 042415  Tablet(s)

 SL as needed No 

Start Date                              Active               

 

                    Calcium with Vitamin D 600 mg (1,500 mg)-400 unit tablet RxN

orm: 641878      1 

Tablet(s) PO QD No Start Date                   Active           

 

           Multivitamin & Mineral Formula Tab RxNorm:    1 Tablet(s) PO QD No St

art Date            

Active                                   

 

          vitamin B complex capsule RxNorm:   1 Capsule(s) PO QD No Start Date  

         Active     

 

          Aspirin 81 mg Tab RxNorm: 227712 1 Tablet(s) PO QD No Start Date      

     Active     

 

                    isosorbide mononitrate ER 30 mg tablet,extended release 24 h

r RxNorm: 457914      1 

Tablet(s) PO QHS No Start Date                   Active           

 

           Vitamin D 1,000 unit Cap RxNorm: 588031 1 Capsule(s) PO QD No Start D

ate            

Active                                   

 

          Co Q-10 oral RxNorm: 24693 oral      No Start Date           Active   

  

 

             metoprolol tartrate 25 mg Tab RxNorm: 437192 1/2 Tablet(s) PO QD No

 Start Date 

10/23/2011                Inactive                   

 

             Nasonex 50 mcg/actuation Spray RxNorm: 6455381 2 Spray NASAL No Sta

rt Date 

2018                Inactive                   

 

           Vitamin B12 1000mcg Tablet RxNorm:    1 Tablet(s) PO QD No Start Date

 2015 

Inactive                                 

 

           amlodipine 5 mg Tab RxNorm: 712251 1 Tablet(s) PO QD No Start Date  

Inactive                                 

 

             simvastatin 40 mg tablet RxNorm: 095566 1 Tablet(s) PO QD No Start 

Date 

2019                Inactive                   

 

                omeprazole 20 mg capsule,delayed release RxNorm: 678715  1 Capsu

le(s) PO BID No 

Start Date          2013          Inactive             

 

                omeprazole 40 mg capsule,delayed release RxNorm: 447391  1 Capsu

le(s) PO QD No 

Start Date          2019          Inactive             

 

           Nexium 40 mg Cap RxNorm: 959107 1 Capsule(s) PO QD No Start Date  

Inactive                                 

 

             Stool Softener 100 mg Tab RxNorm: 5084707 2 Tablet(s) PO BID No Sta

rt Date 

2012                Inactive                   

 

                    Calcium with Vitamin D 600 mg (1,500 mg)-400 unit Tab RxNorm

: 291794      1 Tablet(s)

PO QD           No Start Date   2015      Inactive         

 

             iron 325 mg (65 mg iron) Tab RxNorm: 298060 1 Tablet(s) PO QD No St

art Date 

2015                Inactive                   

 

                Flonase 50 mcg/actuation Nasal Spray RxNorm: 550735  2 [place] ROZ

AL BID No Start 

Date                2014          Inactive             

 

                    fluticasone 50 mcg/actuation nasal spray,suspension RxNorm: 

9685244     2 Spray 

NASAL QD to each nostril No Start Date   2018      Inactive         

 

             Nasonex 50 mcg/actuation Spray RxNorm: 2901047 2 Spray NASAL QD No 

Start Date 

2018                Inactive                   

 

                    hydralazine 50 mg tablet RxNorm: 161430      1 Tablet(s) PO 

as needed for BP over 

160/90          No Start Date   2018      Inactive         

 

             Vimovo 500 mg-20 mg 12 hr Tab RxNorm: 730366 1 Tablet(s) PO BID No 

Start Date 

2011                Inactive                   

 

             Tylenol PM 25 mg-500 mg/15 mL Oral Soln RxNorm: 9077329 1 PO QPM   

  No Start Date 

2015                Inactive                   

 

          Iron (Ferrous Sulfate) Oral RxNorm:   Oral      No Start Date 20

12 Inactive   

 

             Reglan 10 mg Tab RxNorm: 228460 1 Tablet(s) PO TID before meals No 

Start Date 

2011                Inactive                   

 

           Xanax 1 mg Tab RxNorm: 116829 1/2 Tablet(s) PO QD No Start Date  

Inactive                                 

 

             amlodipine 10 mg tablet RxNorm: 059773 1 Tablet(s) PO QD No Start D

ate 

2014                Inactive                   

 

          Iron (dried) Oral RxNorm:   Oral      No Start Date 2012 Inactiv

e   

 

                metoprolol tartrate 50 mg tablet RxNorm: 277563  1 Tablet(s) PO 

BID No Start Date

                    12/10/2018          Inactive             

 

           Xanax 0.25 mg tablet RxNorm: 929065 1 Tablet(s) PO BID No Start Date 

10/29/2018 

Inactive                                 

 

                lancets         RxNorm:         Miscellaneous check blood sugar 

at least once daily No Start 

Date                2013          Inactive             

 

           simvastatin 40 mg Tab RxNorm: 272488 1 Tablet(s) PO QD No Start Date 

2010 

Inactive                                 

 

                    isosorbide mononitrate ER 30 mg tablet,extended release 24 h

r RxNorm: 406899      1 

Tablet(s) PO QHS No Start Date   2019      Inactive         

 

             amlodipine 2.5 mg tablet RxNorm: 415410 1 Tablet(s) PO QD No Start 

Date 

2014                Inactive                   

 

             sucralfate 1 gram tablet RxNorm: 093683 1 Tablet(s) PO QID No Start

 Date 

2019                Inactive                   

 

          Fish Oil Oral RxNorm:   Oral      No Start Date 2012 Inactive   

 

          Multiple Vitamin Oral RxNorm:   Oral      No Start Date 2012 [place]

ctive   

 

                metoprolol tartrate 25 mg tablet RxNorm: 932581  1/2 Tablet(s) P

O QD No Start 

Date                2017          Inactive             

 

                metoprolol tartrate 50 mg tablet RxNorm: 481776  1/2 Tablet(s) P

O BID No Start 

Date                2019          Inactive             

 

                    Flonase Allergy Relief 50 mcg/actuation nasal spray,suspensi

on RxNorm: 0960306     2

[place] NASAL QHS No Start Date   2019      Inactive         







Medication Administered

No Medication Administered data



Immunizations





                Vaccine         Codes           Date            Status

 

                Influenza       CVX: 135        10/22/2019      Complete

 

                Influenza       CVX: 135        10/22/2019      Complete

 

                Influenza       CVX: 135        2018      Complete

 

                Influenza       CVX: 135        10/06/2017      Complete

 

                Influenza       CVX: 135        10/07/2016      Complete

 

                Influenza       CVX: 135        10/16/2015       

 

                Influenza       CVX: 141        2013       

 

                Influenza (Adult) CVX: 141        10/06/2010       







Results





          Observation Observation Code Item      Item Code Result    Date      S

[facility] Location

 

          COMPLETE BLOOD COUNT [id number]   WBC                 7.1 10e9/L 20

20 Unknown

 

          COMPLETE BLOOD COUNT [id number]   RBC                 4.16 10e12/L 2020 Unknown

 

          COMPLETE BLOOD COUNT [id number]   HEMOGLOBIN           12.4 g/dL 20

20 Unknown

 

          COMPLETE BLOOD COUNT [id number]   HEMATOCRIT           39.3 %    20

20 Unknown

 

          COMPLETE BLOOD COUNT 9445084   MCV                 94.5 fL   

0 Unknown

 

          COMPLETE BLOOD COUNT 9393969   MCH                 29.8 pg   

0 Unknown

 

          COMPLETE BLOOD COUNT [id number]   MCHC                31.6 g/dL 

0 Unknown

 

          COMPLETE BLOOD COUNT [id number]   PLATELET COUNT           161 10e9/L 2020 Unknown

 

          COMPLETE BLOOD COUNT [id number]   Mean Plt Volume           10.8 fL   2020 Unknown

 

          COMPLETE BLOOD COUNT [id number]   Neut Auto           38.7 %    

0 Unknown

 

          COMPLETE BLOOD COUNT [id number]   Lymph Auto           48.0 %    20

20 Unknown

 

          COMPLETE BLOOD COUNT [id number]   Mono Auto           9.5 %     

0 Unknown

 

          COMPLETE BLOOD COUNT [id number]   RDW                 13.5 %    

0 Unknown

 

          COMPLETE BLOOD COUNT [id number]   Eos Auto            3.2 %     

0 Unknown

 

          COMPLETE BLOOD COUNT [id number]   Baso Auto           0.6 %     

0 Unknown

 

          COMPLETE BLOOD COUNT [id number]   Neutrophil Abs           2.75 10e9/L  Unknown

 

          COMPLETE BLOOD COUNT [id number]   Lymphocyte Abs           3.41 10e9/L  Unknown

 

          COMPLETE BLOOD COUNT [id number]   Monocyte Abs           0.67 10e9/L 2020 Unknown

 

          COMPLETE BLOOD COUNT [id number]   Eosinophil Abs           0.23 10e9/L  Unknown

 

          COMPLETE BLOOD COUNT [id number]   RDW-SD              44.7 fL   

0 Unknown

 

          COMPLETE BLOOD COUNT [id number]   Basophil Abs           0.04 10e9/L 2020 Unknown

 

          THYROID STIMULATING HORMONE 60265     TSH                 1.677 uIU/mL

 2020 Unknown

 

          COMPREHENSIVE METABOLIC 66116     AST                 19 U/L    2020 Unknown

 

          COMPREHENSIVE METABOLIC 06651     ALT                 12 U/L    2020 Unknown

 

          COMPREHENSIVE METABOLIC 88521     BUN                 17 mg/dL  2020 Unknown

 

          COMPREHENSIVE METABOLIC 07486     ALBUMIN             4.2 g/dL  2020 Unknown

 

          COMPREHENSIVE METABOLIC 40085     CHLORIDE            103 mmol/L  Unknown

 

          COMPREHENSIVE METABOLIC 78007     Bili Total           0.2 mg/dL  Unknown

 

          COMPREHENSIVE METABOLIC 88708     ALK PHOS            61 U/L    2020 Unknown

 

          COMPREHENSIVE METABOLIC 70189     SODIUM              140 mmol/L  Unknown

 

          COMPREHENSIVE METABOLIC 47998     CREATININE           0.95 mg/dL 2020 Unknown

 

          COMPREHENSIVE METABOLIC 58124     CALCIUM             9.4 mg/dL 2020 Unknown

 

          COMPREHENSIVE METABOLIC 14159     POTASSIUM           4.2 mmol/L  Unknown

 

          COMPREHENSIVE METABOLIC 89674     Total Protein           6.5 g/dL   Unknown

 

          COMPREHENSIVE METABOLIC 83946     Glucose             103 mg/dL 2020 Unknown

 

          COMPREHENSIVE METABOLIC 86711     Bicarbonate           26 mmol/L 2020 Unknown

 

          COMPREHENSIVE METABOLIC 35280     AGAP                11 mmol/L 2020 Unknown

 

          GFR CALC  8604885   GFR Non Afr Amr           57 mL/min 2020 Unk

nown

 

          GFR CALC  4180350   GFR Afr Amr           >60 mL/min 2020 Unknow

n

 

          GFR CALC  7173082   GFR Non Afr Amr           52 mL/min 10/11/2019 Unk

nown

 

          GFR CALC  0832451   GFR Afr Amr           >60 mL/min 10/11/2019 Unknow

n

 

          COMPREHENSIVE METABOLIC 07195     AST                 17 U/L    10/11/

2019 Unknown

 

          COMPREHENSIVE METABOLIC 46819     ALT                 11 U/L    10/11/

2019 Unknown

 

          COMPREHENSIVE METABOLIC 95673     BUN                 17 mg/dL  10/11/

2019 Unknown

 

          COMPREHENSIVE METABOLIC 39667     ALBUMIN             3.9 g/dL  10/11/

2019 Unknown

 

          COMPREHENSIVE METABOLIC 21622     CHLORIDE            108 mmol/L 10/11

/2019 Unknown

 

          COMPREHENSIVE METABOLIC 42616     Bili Total           0.5 mg/dL 10/11

/2019 Unknown

 

          COMPREHENSIVE METABOLIC 85856     ALK PHOS            72 U/L    10/11/

2019 Unknown

 

          COMPREHENSIVE METABOLIC 63304     SODIUM              142 mmol/L 10/11

/2019 Unknown

 

          COMPREHENSIVE METABOLIC 11786     CREATININE           1.02 mg/dL 10/1

2019 Unknown

 

          COMPREHENSIVE METABOLIC 53055     CALCIUM             9.3 mg/dL 10/11/

2019 Unknown

 

          COMPREHENSIVE METABOLIC 38761     POTASSIUM           4.0 mmol/L 10/11

/2019 Unknown

 

          COMPREHENSIVE METABOLIC 06311     Total Protein           6.2 g/dL  10

/2019 Unknown

 

          COMPREHENSIVE METABOLIC 98506     Glucose             93 mg/dL  10/11/

2019 Unknown

 

          COMPREHENSIVE METABOLIC 99074     Bicarbonate           27 mmol/L 10/1

2019 Unknown

 

          COMPREHENSIVE METABOLIC 19765     AGAP                7 mmol/L  10/11/

2019 Unknown

 

          GFR CALC  2555859   GFR Non Afr Amr           48 mL/min 06/10/2019 Unk

nown

 

          GFR CALC  4474143   GFR Afr Amr           59 mL/min 06/10/2019 Unknown

 

          THYROID STIMULATING HORMONE 61502     TSH                 1.936 uIU/mL

 06/10/2019 Unknown

 

          COMPREHENSIVE METABOLIC 95863     AST                 18 U/L    06/10/

2019 Unknown

 

          COMPREHENSIVE METABOLIC 15338     ALT                 11 U/L    06/10/

2019 Unknown

 

          COMPREHENSIVE METABOLIC 39656     BUN                 18 mg/dL  06/10/

2019 Unknown

 

          COMPREHENSIVE METABOLIC 89082     ALBUMIN             4.2 g/dL  06/10/

2019 Unknown

 

          COMPREHENSIVE METABOLIC 51973     CHLORIDE            109 mmol/L 06/10

/2019 Unknown

 

          COMPREHENSIVE METABOLIC 95048     Bili Total           0.4 mg/dL 06/10

/2019 Unknown

 

          COMPREHENSIVE METABOLIC 90321     ALK PHOS            64 U/L    06/10/

2019 Unknown

 

          COMPREHENSIVE METABOLIC 92410     SODIUM              142 mmol/L 06/10

/2019 Unknown

 

          COMPREHENSIVE METABOLIC 40155     CREATININE           1.09 mg/dL  Unknown

 

          COMPREHENSIVE METABOLIC 69777     CALCIUM             9.3 mg/dL 06/10/

2019 Unknown

 

          COMPREHENSIVE METABOLIC 22050     POTASSIUM           4.7 mmol/L 06/10

/2019 Unknown

 

          COMPREHENSIVE METABOLIC 93851     Total Protein           6.3 g/dL  06

/10/2019 Unknown

 

          COMPREHENSIVE METABOLIC 70934     Glucose             84 mg/dL  06/10/

2019 Unknown

 

          COMPREHENSIVE METABOLIC 43157     Bicarbonate           25 mmol/L  Unknown

 

          COMPREHENSIVE METABOLIC 36027     AGAP                8 mmol/L  06/10/

2019 Unknown

 

          COMPLETE BLOOD COUNT [id number]   WBC                 7.8 10e9/L 06/10/20

19 Unknown

 

          COMPLETE BLOOD COUNT [id number]   RBC                 4.04 10e12/L 06/10/

2019 Unknown

 

          COMPLETE BLOOD COUNT [id number]   HEMOGLOBIN           12.2 g/dL 06/10/20

19 Unknown

 

          COMPLETE BLOOD COUNT [id number]   HEMATOCRIT           38.6 %    06/10/20

19 Unknown

 

          COMPLETE BLOOD COUNT [id number]   MCV                 95.5 fL   06/10/201

9 Unknown

 

          COMPLETE BLOOD COUNT [id number]   MCH                 30.2 pg   06/10/201

9 Unknown

 

          COMPLETE BLOOD COUNT [id number]   MCHC                31.6 g/dL 06/10/201

9 Unknown

 

          COMPLETE BLOOD COUNT [id number]   PLATELET COUNT           215 10e9/L 06/

10/2019 Unknown

 

          COMPLETE BLOOD COUNT [id number]   Mean Plt Volume           11.2 fL   06/

10/2019 Unknown

 

          COMPLETE BLOOD COUNT [id number]   Neut Auto           45.7 %    06/10/201

9 Unknown

 

          COMPLETE BLOOD COUNT [id number]   Lymph Auto           42.1 %    06/10/20

19 Unknown

 

          COMPLETE BLOOD COUNT [id number]   Mono Auto           9.2 %     06/10/201

9 Unknown

 

          COMPLETE BLOOD COUNT [id number]   RDW                 13.4 %    06/10/201

9 Unknown

 

          COMPLETE BLOOD COUNT [id number]   Eos Auto            2.7 %     06/10/201

9 Unknown

 

          COMPLETE BLOOD COUNT [id number]   Baso Auto           0.3 %     06/10/201

9 Unknown

 

          COMPLETE BLOOD COUNT [id number]   Neutrophil Abs           3.56 10e9/L 06

/10/2019 Unknown

 

          COMPLETE BLOOD COUNT [id number]   Lymphocyte Abs           3.28 10e9/L 06

/10/2019 Unknown

 

          COMPLETE BLOOD COUNT [id number]   Monocyte Abs           0.72 10e9/L  Unknown

 

          COMPLETE BLOOD COUNT [id number]   Eosinophil Abs           0.21 10e9/L 06

/10/2019 Unknown

 

          COMPLETE BLOOD COUNT [id number]   RDW-SD              44.9 fL   06/10/201

9 Unknown

 

          COMPLETE BLOOD COUNT [id number]   Basophil Abs           0.02 10e9/L  Unknown

 

          COMPLETE BLOOD COUNT [id number]   WBC                 5.2 10e9/L 20

18 Unknown

 

          COMPLETE BLOOD COUNT [id number]   RBC                 4.21 10e12/L 2018 Unknown

 

          COMPLETE BLOOD COUNT [id number]   HEMOGLOBIN           12.8 g/dL 20

18 Unknown

 

          COMPLETE BLOOD COUNT [id number]   HEMATOCRIT           39.0 %    20

18 Unknown

 

          COMPLETE BLOOD COUNT [id number]   MCV                 92.6 fL   

8 Unknown

 

          COMPLETE BLOOD COUNT [id number]   MCH                 30.4 pg   

8 Unknown

 

          COMPLETE BLOOD COUNT [id number]   MCHC                32.8 g/dL 

8 Unknown

 

          COMPLETE BLOOD COUNT [id number]   PLATELET COUNT           202 10e9/L  Unknown

 

          COMPLETE BLOOD COUNT [id number]   Mean Plt Volume           10.7 fL    Unknown

 

          COMPLETE BLOOD COUNT [id number]   Neut Auto           40.9 %    

8 Unknown

 

          COMPLETE BLOOD COUNT [id number]   Lymph Auto           46.3 %    20

18 Unknown

 

          COMPLETE BLOOD COUNT [id number]   Mono Auto           9.3 %     

8 Unknown

 

          COMPLETE BLOOD COUNT [id number]   RDW                 13.7 %    

8 Unknown

 

          COMPLETE BLOOD COUNT [id number]   Eos Auto            2.9 %     

8 Unknown

 

          COMPLETE BLOOD COUNT [id number]   Baso Auto           0.6 %     

8 Unknown

 

          COMPLETE BLOOD COUNT [id number]   Neutrophil Abs           2.13 10e9/L  Unknown

 

          COMPLETE BLOOD COUNT [id number]   Lymphocyte Abs           2.41 10e9/L  Unknown

 

          COMPLETE BLOOD COUNT [id number]   Monocyte Abs           0.48 10e9/L  Unknown

 

          COMPLETE BLOOD COUNT [id number]   Eosinophil Abs           0.15 10e9/L  Unknown

 

          COMPLETE BLOOD COUNT [id number]   RDW-SD              45.2 fL   

8 Unknown

 

          COMPLETE BLOOD COUNT [id number]   Basophil Abs           0.03 10e9/L  Unknown

 

          [place] [address]     Glucose             118 mg/dL  Unknown

 

          [place] [address]     CREATININE           1.01 mg/dL  Unknown

 

          [place] [address]     BUN                 14 mg/dL   Unknown

 

          [place] [address]     SODIUM              141 mmol/L  Unknown

 

          [place] [address]     POTASSIUM           4.0 mmol/L  Unknown

 

          [place] [address]     CHLORIDE            108 mmol/L  Unknown

 

          [place] [address]     Bicarbonate           25 mmol/L  Unknown

 

          [place] [address]     AGAP                8 mmol/L   Unknown

 

          [place] [address]     CALCIUM             9.6 mg/dL  Unknown

 

          FREE T4   65662     T4 Free             1.23 ng/dL 2018 Unknown

 

          GFR CALC  9179735   GFR Non Afr Amr           53 mL/min 2018 Unk

nown

 

          GFR CALC  2531017   GFR Afr Amr           >60 mL/min 2018 Unknow

n

 

          THYROID STIMULATING HORMONE 96327     TSH                 2.124 uIU/mL

 2018 Unknown

 

          LIPID GROUP 81246     Cholesterol           152 mg/dL 2018 Unkno

wn

 

          LIPID GROUP 16243     Triglyceride           151 mg/dL 2018 Unkn

own

 

          LIPID GROUP 30516     HDL CHOLESTEROL           47 mg/dL  2018 U

nknown

 

          LIPID GROUP 09541     Chol/HDL Ratio           3.23 ratio 2018 U

nknown

 

          LIPID GROUP 00633     NON-HDL Chol           105 mg/dL 2018 Unkn

own

 

          LIPID GROUP 03309     LDL Cholesterol           75 mg/dL  2018 U

nknown

 

          ASSAY OF TROPONIN QUANT 30592     Troponin-I           <0.30 ng/mL  Unknown

 

          COMPREHENSIVE METABOLIC 68814     AST                 20 U/L    2018 Unknown

 

          COMPREHENSIVE METABOLIC 56647     ALT                 14 U/L    2018 Unknown

 

          COMPREHENSIVE METABOLIC 76954     BUN                 19 mg/dL  2018 Unknown

 

          COMPREHENSIVE METABOLIC 62157     ALBUMIN             4.2 g/dL  2018 Unknown

 

          COMPREHENSIVE METABOLIC 86729     CHLORIDE            102 mmol/L  Unknown

 

          COMPREHENSIVE METABOLIC 06966     Bili Total           0.4 mg/dL  Unknown

 

          COMPREHENSIVE METABOLIC 18170     ALK PHOS            66 U/L    2018 Unknown

 

          COMPREHENSIVE METABOLIC 12395     SODIUM              135 mmol/L  Unknown

 

          COMPREHENSIVE METABOLIC 94696     CREATININE           1.01 mg/dL  Unknown

 

          COMPREHENSIVE METABOLIC 78929     CALCIUM             9.3 mg/dL 2018 Unknown

 

          COMPREHENSIVE METABOLIC 00828     POTASSIUM           4.8 mmol/L  Unknown

 

          COMPREHENSIVE METABOLIC 53735     Total Protein           7.0 g/dL   Unknown

 

          COMPREHENSIVE METABOLIC 90941     Glucose             91 mg/dL  2018 Unknown

 

          COMPREHENSIVE METABOLIC 30471     Bicarbonate           23 mmol/L  Unknown

 

          COMPREHENSIVE METABOLIC 43979     AGAP                10 mmol/L 2018 Unknown

 

          COMPLETE BLOOD COUNT [id number]   WBC                 7.5 10e9/L 20

18 Unknown

 

          COMPLETE BLOOD COUNT [id number]   RBC                 4.13 10e12/L 2018 Unknown

 

          COMPLETE BLOOD COUNT [id number]   HEMOGLOBIN           12.6 g/dL 20

18 Unknown

 

          COMPLETE BLOOD COUNT [id number]   HEMATOCRIT           38.4 %    20

18 Unknown

 

          COMPLETE BLOOD COUNT [id number]   MCV                 93.0 fL   

8 Unknown

 

          COMPLETE BLOOD COUNT [id number]   MCH                 30.5 pg   

8 Unknown

 

          COMPLETE BLOOD COUNT [id number]   MCHC                32.8 g/dL 

8 Unknown

 

          COMPLETE BLOOD COUNT [id number]   PLATELET COUNT           204 10e9/L  Unknown

 

          COMPLETE BLOOD COUNT [id number]   Mean Plt Volume           10.9 fL    Unknown

 

          COMPLETE BLOOD COUNT [id number]   Neut Auto           43.1 %    

8 Unknown

 

          COMPLETE BLOOD COUNT [id number]   Lymph Auto           45.0 %    20

18 Unknown

 

          COMPLETE BLOOD COUNT [id number]   Mono Auto           9.2 %     

8 Unknown

 

          COMPLETE BLOOD COUNT [id number]   RDW                 13.4 %    

8 Unknown

 

          COMPLETE BLOOD COUNT [id number]   Eos Auto            2.3 %     

8 Unknown

 

          COMPLETE BLOOD COUNT [id number]   Baso Auto           0.4 %     

8 Unknown

 

          COMPLETE BLOOD COUNT [id number]   Neutrophil Abs           3.23 10e9/L  Unknown

 

          COMPLETE BLOOD COUNT [id number]   Lymphocyte Abs           3.38 10e9/L  Unknown

 

          COMPLETE BLOOD COUNT [id number]   Monocyte Abs           0.69 10e9/L  Unknown

 

          COMPLETE BLOOD COUNT [id number]   Eosinophil Abs           0.17 10e9/L  Unknown

 

          COMPLETE BLOOD COUNT [id number]   RDW-SD              44.4 fL   

8 Unknown

 

          COMPLETE BLOOD COUNT [id number]   Basophil Abs           0.03 10e9/L  Unknown

 

          GFR CALC  5471141   GFR Non Afr Amr           53 mL/min 2018 Unk

nown

 

          GFR CALC  5536242   GFR Afr Amr           >60 mL/min 2018 Unknow

n

 

          GLYCOSYLATED HEMOGLOBIN TEST 61928     Hgb A1c   96690-6   5.4 %     0

2018 Unknown

 

          MEAN GLUC 7034967   Calc Mean Gluc           108 mg/dL 2018 Unkn

own

 

          MEAN GLUC 0116727   Calc Mean Gluc           114 mg/dL 2017 Unkn

own

 

          LIPID GROUP 08797     Cholesterol           146 mg/dL 2017 Unkno

wn

 

          LIPID GROUP 05629     Triglyceride           119 mg/dL 2017 Unkn

own

 

          LIPID GROUP 04736     HDL CHOLESTEROL           47 mg/dL  2017 U

nknown

 

          LIPID GROUP 28383     Chol/HDL Ratio           3.11 ratio 2017 U

nknown

 

          LIPID GROUP 24541     NON-HDL Chol           99 mg/dL  2017 Unkn

own

 

          LIPID GROUP 29721     LDL Cholesterol           75 mg/dL  2017 U

nknown

 

          GLYCOSYLATED HEMOGLOBIN TEST 64104     Hgb A1c   86457-7   5.6 %     0

2017 Unknown

 

          COMPREHENSIVE METABOLIC 00556     AST                 22 U/L    2017 Unknown

 

          COMPREHENSIVE METABOLIC 21337     ALT                 12 U/L    2017 Unknown

 

          COMPREHENSIVE METABOLIC 39100     BUN                 17 mg/dL  2017 Unknown

 

          COMPREHENSIVE METABOLIC 13426     ALBUMIN             4.0 g/dL  2017 Unknown

 

          COMPREHENSIVE METABOLIC 56935     CHLORIDE            110 mmol/L  Unknown

 

          COMPREHENSIVE METABOLIC 41760     Bili Total           0.4 mg/dL  Unknown

 

          COMPREHENSIVE METABOLIC 44859     ALK PHOS            63 U/L    2017 Unknown

 

          COMPREHENSIVE METABOLIC 42212     SODIUM              140 mmol/L  Unknown

 

          COMPREHENSIVE METABOLIC 60047     CREATININE           1.05 mg/dL  Unknown

 

          COMPREHENSIVE METABOLIC 43092     CALCIUM             9.2 mg/dL 2017 Unknown

 

          COMPREHENSIVE METABOLIC 60898     POTASSIUM           4.2 mmol/L  Unknown

 

          COMPREHENSIVE METABOLIC 69251     Total Protein           6.2 g/dL   Unknown

 

          COMPREHENSIVE METABOLIC 70313     Glucose             87 mg/dL  2017 Unknown

 

          COMPREHENSIVE METABOLIC 72809     Bicarbonate           24 mmol/L  Unknown

 

          COMPREHENSIVE METABOLIC 02024     AGAP                6 mmol/L  2017 Unknown

 

          GFR CALC  5213467   GFR Non Afr Amr           51 mL/min 2017 Unk

nown

 

          GFR CALC  1025754   GFR Afr Amr           >60 mL/min 2017 Unknow

n

 

          COMPLETE BLOOD COUNT [id number]   WBC                 6.7 10e9/L 20

17 Unknown

 

          COMPLETE BLOOD COUNT [id number]   RBC                 4.04 10e12/L 2017 Unknown

 

          COMPLETE BLOOD COUNT [id number]   HEMOGLOBIN           12.1 g/dL 20

17 Unknown

 

          COMPLETE BLOOD COUNT [id number]   HEMATOCRIT           38.0 %    20

17 Unknown

 

          COMPLETE BLOOD COUNT [id number]   MCV                 94.1 fL   

7 Unknown

 

          COMPLETE BLOOD COUNT [id number]   MCH                 30.0 pg   

7 Unknown

 

          COMPLETE BLOOD COUNT [id number]   MCHC                31.8 g/dL 

7 Unknown

 

          COMPLETE BLOOD COUNT [id number]   PLATELET COUNT           206 10e9/L  Unknown

 

          COMPLETE BLOOD COUNT [id number]   Mean Plt Volume           11.3 fL    Unknown

 

          COMPLETE BLOOD COUNT [id number]   Neut Auto           35.8 %    

7 Unknown

 

          COMPLETE BLOOD COUNT [id number]   Lymph Auto           51.6 %    20

17 Unknown

 

          COMPLETE BLOOD COUNT [id number]   Mono Auto           8.8 %     

7 Unknown

 

          COMPLETE BLOOD COUNT [id number]   RDW                 13.5 %    

7 Unknown

 

          COMPLETE BLOOD COUNT [id number]   Eos Auto            3.4 %     

7 Unknown

 

          COMPLETE BLOOD COUNT [id number]   Baso Auto           0.4 %     

7 Unknown

 

          COMPLETE BLOOD COUNT [id number]   Neutrophil Abs           2.40 10e9/L  Unknown

 

          COMPLETE BLOOD COUNT [id number]   Lymphocyte Abs           3.46 10e9/L  Unknown

 

          COMPLETE BLOOD COUNT [id number]   Monocyte Abs           0.59 10e9/L  Unknown

 

          COMPLETE BLOOD COUNT [id number]   Eosinophil Abs           0.23 10e9/L  Unknown

 

          COMPLETE BLOOD COUNT [id number]   RDW-SD              45.3 fL   

7 Unknown

 

          COMPLETE BLOOD COUNT [id number]   Basophil Abs           0.03 10e9/L  Unknown

 

          THYROID STIMULATING HORMONE 69889     TSH                 1.981 uIU/mL

 2017 Unknown

 

          COMPLETE BLOOD COUNT [id number]   WBC                 6.0 10e9/L 20

17 Unknown

 

          COMPLETE BLOOD COUNT [id number]   RBC                 4.29 10e12/L 2017 Unknown

 

          COMPLETE BLOOD COUNT [id number]   HEMOGLOBIN           12.9 g/dL 20

17 Unknown

 

          COMPLETE BLOOD COUNT [id number]   HEMATOCRIT           38.4 %    20

17 Unknown

 

          COMPLETE BLOOD COUNT [id number]   MCV                 89.5 fL   

7 Unknown

 

          COMPLETE BLOOD COUNT [id number]   MCH                 30.1 pg   

7 Unknown

 

          COMPLETE BLOOD COUNT [id number]   MCHC                33.6 g/dL 

7 Unknown

 

          COMPLETE BLOOD COUNT [id number]   PLATELET COUNT           181 10e9/L 2017 Unknown

 

          COMPLETE BLOOD COUNT [id number]   Mean Plt Volume           11.7 fL   2017 Unknown

 

          COMPLETE BLOOD COUNT [id number]   Neut Auto           36.9 %    

7 Unknown

 

          COMPLETE BLOOD COUNT [id number]   Lymph Auto           50.4 %    20

17 Unknown

 

          COMPLETE BLOOD COUNT [id number]   Mono Auto           9.0 %     

7 Unknown

 

          COMPLETE BLOOD COUNT [id number]   RDW                 13.7 %    

7 Unknown

 

          COMPLETE BLOOD COUNT [id number]   Eos Auto            3.4 %     

7 Unknown

 

          COMPLETE BLOOD COUNT [id number]   Baso Auto           0.3 %     

7 Unknown

 

          COMPLETE BLOOD COUNT [id number]   Neutrophil Abs           2.21 10e9/L  Unknown

 

          COMPLETE BLOOD COUNT [id number]   Lymphocyte Abs           3.02 10e9/L  Unknown

 

          COMPLETE BLOOD COUNT [id number]   Monocyte Abs           0.54 10e9/L 2017 Unknown

 

          COMPLETE BLOOD COUNT [id number]   Eosinophil Abs           0.20 10e9/L  Unknown

 

          COMPLETE BLOOD COUNT [id number]   RDW-SD              44.0 fL   

7 Unknown

 

          COMPLETE BLOOD COUNT [id number]   Basophil Abs           0.02 10e9/L 2017 Unknown

 

          GLYCOSYLATED HEMOGLOBIN TEST 96167     Hgb A1c   63378-1   5.4 %     0

3/09/2017 Unknown

 

          THYROID STIMULATING HORMONE 56261     TSH                 2.200 uIU/mL

 2017 Unknown

 

          GFR CALC  6242312   GFR Non Afr Amr           50 mL/min 2017 Unk

nown

 

          GFR CALC  2349861   GFR Afr Amr           >60 mL/min 2017 Unknow

n

 

          MEAN GLUC 8128166   Calc Mean Gluc           108 mg/dL 2017 Unkn

own

 

          COMPREHENSIVE METABOLIC 25610     AST                 18 U/L    2017 Unknown

 

          COMPREHENSIVE METABOLIC 64844     ALT                 10 U/L    2017 Unknown

 

          COMPREHENSIVE METABOLIC 65173     BUN                 20 mg/dL  2017 Unknown

 

          COMPREHENSIVE METABOLIC 31573     ALBUMIN             4.1 g/dL  2017 Unknown

 

          COMPREHENSIVE METABOLIC 81412     CHLORIDE            109 mmol/L  Unknown

 

          COMPREHENSIVE METABOLIC 53994     Bili Total           0.6 mg/dL  Unknown

 

          COMPREHENSIVE METABOLIC 71221     ALK PHOS            64 U/L    2017 Unknown

 

          COMPREHENSIVE METABOLIC 76711     SODIUM              141 mmol/L  Unknown

 

          COMPREHENSIVE METABOLIC 93290     CREATININE           1.06 mg/dL 2017 Unknown

 

          COMPREHENSIVE METABOLIC 45343     CALCIUM             9.9 mg/dL 2017 Unknown

 

          COMPREHENSIVE METABOLIC 23010     POTASSIUM           4.2 mmol/L  Unknown

 

          COMPREHENSIVE METABOLIC 53583     Total Protein           6.3 g/dL   Unknown

 

          COMPREHENSIVE METABOLIC 76847     Glucose             99 mg/dL  2017 Unknown

 

          COMPREHENSIVE METABOLIC 14911     Bicarbonate           21 mmol/L 2017 Unknown

 

          COMPREHENSIVE METABOLIC 13133     AGAP                11 mmol/L 2017 Unknown

 

          LIPID GROUP [id number]     Cholesterol           169 mg/dL 2016 Unkno

wn

 

          LIPID GROUP [id number]     Triglyceride           165 mg/dL 2016 Unkn

own

 

          LIPID GROUP 05730     HDL CHOLESTEROL           43 mg/dL  2016 U

nknown

 

          LIPID GROUP 93021     Chol/HDL Ratio           3.93 ratio 2016 U

nknown

 

          LIPID GROUP 99580     NON-HDL Chol           126 mg/dL 2016 Unkn

own

 

          LIPID GROUP [id number]     LDL Cholesterol           93 mg/dL  2016 U

nknown

 

          COMPREHENSIVE METABOLIC 01014     AST                 18 U/L    2016 Unknown

 

          COMPREHENSIVE METABOLIC 80600     ALT                 10 U/L    2016 Unknown

 

          COMPREHENSIVE METABOLIC 76309     BUN                 20 mg/dL  2016 Unknown

 

          COMPREHENSIVE METABOLIC 31332     ALBUMIN             3.9 g/dL  2016 Unknown

 

          COMPREHENSIVE METABOLIC 06494     CHLORIDE            110 mmol/L  Unknown

 

          COMPREHENSIVE METABOLIC 83875     Bili Total           0.5 mg/dL  Unknown

 

          COMPREHENSIVE METABOLIC 37935     ALK PHOS            72 U/L    2016 Unknown

 

          COMPREHENSIVE METABOLIC 28163     SODIUM              141 mmol/L  Unknown

 

          COMPREHENSIVE METABOLIC 53048     CREATININE           1.12 mg/dL  Unknown

 

          COMPREHENSIVE METABOLIC 14108     CALCIUM             9.7 mg/dL 2016 Unknown

 

          COMPREHENSIVE METABOLIC 48707     POTASSIUM           4.4 mmol/L  Unknown

 

          COMPREHENSIVE METABOLIC 75130     Total Protein           6.2 g/dL   Unknown

 

          COMPREHENSIVE METABOLIC 48983     Glucose             90 mg/dL  2016 Unknown

 

          COMPREHENSIVE METABOLIC 90016     Bicarbonate           23 mmol/L  Unknown

 

          COMPREHENSIVE METABOLIC 64702     AGAP                8 mmol/L  2016 Unknown

 

          GFR CALC  5425178   GFR Non Afr Amr           47 mL/min 2016 Unk

nown

 

          GFR CALC  5811531   GFR Afr Amr           57 mL/min 2016 Unknown

 

          GLYCOSYLATED HEMOGLOBIN TEST 83730     Hgb A1c   61999-4   5.5 %     0

2016 Unknown

 

          THYROID STIMULATING HORMONE 84311     TSH                 2.537 uIU/mL

 2016 Unknown

 

          FREE T4   53490     T4 Free             1.36 ng/dL 2016 Unknown

 

          COMPLETE BLOOD COUNT 1840809   WBC                 6.8 10e9/L 20

16 Unknown

 

          COMPLETE BLOOD COUNT [id number]   RBC                 4.20 10e12/L 2016 Unknown

 

          COMPLETE BLOOD COUNT [id number]   HEMOGLOBIN           12.5 g/dL 20

16 Unknown

 

          COMPLETE BLOOD COUNT [id number]   HEMATOCRIT           38.0 %    20

16 Unknown

 

          COMPLETE BLOOD COUNT 8069370   MCV                 90.5 fL   

6 Unknown

 

          COMPLETE BLOOD COUNT [id number]   MCH                 29.8 pg   

6 Unknown

 

          COMPLETE BLOOD COUNT [id number]   MCHC                32.9 g/dL 

6 Unknown

 

          COMPLETE BLOOD COUNT [id number]   PLATELET COUNT           197 10e9/L  Unknown

 

          COMPLETE BLOOD COUNT 8684976   Mean Plt Volume           11.7 fL    Unknown

 

          COMPLETE BLOOD COUNT [id number]   Neut Auto           41.3 %    

6 Unknown

 

          COMPLETE BLOOD COUNT 8897484   Lymph Auto           47.1 %    20

16 Unknown

 

          COMPLETE BLOOD COUNT [id number]   Mono Auto           7.8 %     

6 Unknown

 

          COMPLETE BLOOD COUNT [id number]   RDW                 13.8 %    

6 Unknown

 

          COMPLETE BLOOD COUNT [id number]   Eos Auto            3.4 %     

6 Unknown

 

          COMPLETE BLOOD COUNT [id number]   Baso Auto           0.4 %     

6 Unknown

 

          COMPLETE BLOOD COUNT [id number]   Neutrophil Abs           2.81 10e9/L  Unknown

 

          COMPLETE BLOOD COUNT [id number]   Lymphocyte Abs           3.20 10e9/L  Unknown

 

          COMPLETE BLOOD COUNT [id number]   Monocyte Abs           0.53 10e9/L  Unknown

 

          COMPLETE BLOOD COUNT [id number]   Eosinophil Abs           0.23 10e9/L  Unknown

 

          COMPLETE BLOOD COUNT [id number]   RDW-SD              44.4 fL   

6 Unknown

 

          COMPLETE BLOOD COUNT [id number]   Basophil Abs           0.03 10e9/L  Unknown

 

          MEAN GLUC 9733483   Calc Mean Gluc           111 mg/dL 2016 Unkn

own

 

          [place] [address]     Glucose             89 MG/DL   Unknown

 

          [place] [address]     CREATININE           1.12 MG/DL  Unknown

 

          [place] [address]     BUN                 20 MG/DL   Unknown

 

          [place] [address]     SODIUM              139 MMOL/L  Unknown

 

          [place] [address]     POTASSIUM           4.6 MMOL/L  Unknown

 

          [place] [address]     CHLORIDE            108 MMOL/L  Unknown

 

          [place] [address]     BICARB              26 MMOL/L  Unknown

 

          [place] [address]     ANION GAP           5 MEQ/L    Unknown

 

          [place] [address]     CALCIUM             10.0 MG/DL  Unknown

 

          GFR CALC  9371450   GFR AA              57.0L ML/MIN 2015 Unknow

n

 

          GFR CALC  3788097   GFR NON-AA           47.0L ML/MIN 2015 Unkno

wn

 

          THYROID STIMULATING HORMONE 55919     TSH                 2.378 uIU/ML

 09/10/2015 Unknown

 

          COMPLETE BLOOD COUNT [id number]   WBC                 6.4 10e9/L 09/10/20

15 Unknown

 

          COMPLETE BLOOD COUNT [id number]   RBC                 3.99 10e12/L 09/10/

2015 Unknown

 

          COMPLETE BLOOD COUNT [id number]   HGB                 11.9 g/dL 09/10/201

5 Unknown

 

          COMPLETE BLOOD COUNT [id number]   HCT DET             36.9 %    09/10/201

5 Unknown

 

          COMPLETE BLOOD COUNT [id number]   MCV                 92.5 fL   09/10/201

5 Unknown

 

          COMPLETE BLOOD COUNT [id number]   MCH                 29.8 pg   09/10/201

5 Unknown

 

          COMPLETE BLOOD COUNT [id number]   MCHC                32.2 g/dL 09/10/201

5 Unknown

 

          COMPLETE BLOOD COUNT [id number]   PLT                 172 10e9/L 09/10/20

15 Unknown

 

          COMPLETE BLOOD COUNT [id number]   MPV                 11.7 fL   09/10/201

5 Unknown

 

          COMPLETE BLOOD COUNT [id number]   ARIK %               40.4 %    09/10/201

5 Unknown

 

          COMPLETE BLOOD COUNT [id number]   LY %                48.0 %    09/10/201

5 Unknown

 

          COMPLETE BLOOD COUNT [id number]   MON %               8.3 %     09/10/201

5 Unknown

 

          COMPLETE BLOOD COUNT [id number]   EOS  %              2.8 %     09/10/201

5 Unknown

 

          COMPLETE BLOOD COUNT [id number]   BASO %              0.5 %     09/10/201

5 Unknown

 

          COMPLETE BLOOD COUNT [id number]   RDW                 13.6 %    09/10/201

5 Unknown

 

          COMPLETE BLOOD COUNT [id number]   ABS ARIK             2.59 10e9/L 09/10/2

015 Unknown

 

          COMPLETE BLOOD COUNT [id number]   ABS LYMPH           3.07 10e9/L 09/10/2

015 Unknown

 

          COMPLETE BLOOD COUNT [id number]   ABS MONO            0.53 10e9/L 09/10/2

015 Unknown

 

          COMPLETE BLOOD COUNT [id number]   ABS EOS             0.18 10e9/L 09/10/2

015 Unknown

 

          COMPLETE BLOOD COUNT [id number]   ABS BASO            0.03 10e9/L 09/10/2

015 Unknown

 

          COMPLETE BLOOD COUNT [id number]   RDW-SD              44.9 fL   09/10/201

5 Unknown

 

          LIPID GROUP [id number]     HDL TEST            42 MG/DL  09/10/2015 Unknown

 

          LIPID GROUP 89836     TRIG                177 MG/DL 09/10/2015 Unknown

 

          LIPID GROUP [id number]     TEST LDL            72 MG/DL  09/10/2015 Unknown

 

          LIPID GROUP 61940     CHOL                149 MG/DL 09/10/2015 Unknown

 

          LIPID GROUP [id number]     RCHOL/HDL           3.55 RATIO 09/10/2015 Unknow

n

 

          LIPID GROUP 62704     NON-HDL CH           107 MG/DL 09/10/2015 Unknow

n

 

          GLYCOSYLATED HEMOGLOBIN TEST 04344     A1C HPLC  88284-1   5.5 %     0

9/10/2015 Unknown

 

          FREE T4   34624     FREE T4             1.39 NG/DL 09/10/2015 Unknown

 

          GFR CALC  9664316   GFR AA              55.0L ML/MIN 09/10/2015 Unknow

n

 

          GFR CALC  9109135   GFR NON-AA           46.0L ML/MIN 09/10/2015 Unkno

wn

 

          COMPREHENSIVE METABOLIC 00452     AST                 17 U/L    09/10/

2015 Unknown

 

          COMPREHENSIVE METABOLIC 32885     ALT                 10 IU/L   09/10/

2015 Unknown

 

          COMPREHENSIVE METABOLIC 14468     BUN                 20 MG/DL  09/10/

2015 Unknown

 

          COMPREHENSIVE METABOLIC 08712     ALBUMIN             3.9 GM/DL 09/10/

2015 Unknown

 

          COMPREHENSIVE METABOLIC 44521     CHLORIDE            111 MMOL/L 09/10

/2015 Unknown

 

          COMPREHENSIVE METABOLIC 99987     BILI TOT            0.4 MG/DL 09/10/

2015 Unknown

 

          COMPREHENSIVE METABOLIC 41335     ALK PHOS            70 U/L    09/10/

2015 Unknown

 

          COMPREHENSIVE METABOLIC 48786     SODIUM              142 MMOL/L 09/10

/2015 Unknown

 

          COMPREHENSIVE METABOLIC 62511     CREATININE           1.16 MG/DL  Unknown

 

          COMPREHENSIVE METABOLIC 79362     CALCIUM             9.4 MG/DL 09/10/

2015 Unknown

 

          COMPREHENSIVE METABOLIC 49955     POTASSIUM           4.6 MMOL/L 09/10

/2015 Unknown

 

          COMPREHENSIVE METABOLIC 68622     PROT TOT            6.2 GM/DL 09/10/

2015 Unknown

 

          COMPREHENSIVE METABOLIC 26698     Glucose             90 MG/DL  09/10/

2015 Unknown

 

          COMPREHENSIVE METABOLIC 96458     BICARB              24 MMOL/L 09/10/

2015 Unknown

 

          COMPREHENSIVE METABOLIC 02156     ANION GAP           7 MEQ/L   09/10/

2015 Unknown

 

          THYROID STIMULATING HORMONE 89587     TSH                 2.427 uIU/ML

 2015 Unknown

 

          LIPID GROUP 86119     HDL TEST            47 MG/DL  2015 Unknown

 

          LIPID GROUP 93679     TRIG                145 MG/DL 2015 Unknown

 

          LIPID GROUP 37794     TEST LDL            73 MG/DL  2015 Unknown

 

          LIPID GROUP 63589     CHOL                149 MG/DL 2015 Unknown

 

          LIPID GROUP [id number]     RCHOL/HDL           3.17 RATIO 2015 Unknow

n

 

          LIPID GROUP 95574     NON-HDL CH           102 MG/DL 2015 Unknow

n

 

          COMPREHENSIVE METABOLIC 94982     AST                 17 U/L    2015 Unknown

 

          COMPREHENSIVE METABOLIC 03781     ALT                 9 IU/L    2015 Unknown

 

          COMPREHENSIVE METABOLIC 10228     BUN                 19 MG/DL  2015 Unknown

 

          COMPREHENSIVE METABOLIC 61875     ALBUMIN             4.3 GM/DL 2015 Unknown

 

          COMPREHENSIVE METABOLIC 67385     CHLORIDE            108 MMOL/L  Unknown

 

          COMPREHENSIVE METABOLIC 52308     BILI TOT            0.5 MG/DL 2015 Unknown

 

          COMPREHENSIVE METABOLIC 85304     ALK PHOS            68 U/L    2015 Unknown

 

          COMPREHENSIVE METABOLIC 33420     SODIUM              140 MMOL/L  Unknown

 

          COMPREHENSIVE METABOLIC 45501     CREATININE           1.08 MG/DL 2015 Unknown

 

          COMPREHENSIVE METABOLIC 40025     CALCIUM             9.9 MG/DL 2015 Unknown

 

          COMPREHENSIVE METABOLIC 68216     POTASSIUM           4.3 MMOL/L  Unknown

 

          COMPREHENSIVE METABOLIC 11468     PROT TOT            7.2 GM/DL 2015 Unknown

 

          COMPREHENSIVE METABOLIC 52240     Glucose             94 MG/DL  2015 Unknown

 

          COMPREHENSIVE METABOLIC 57505     BICARB              26 MMOL/L 2015 Unknown

 

          COMPREHENSIVE METABOLIC 82937     ANION GAP           6 MEQ/L   2015 Unknown

 

          GFR CALC  4995376   GFR AA              60.0L ML/MIN 2015 Unknow

n

 

          GFR CALC  0356453   GFR NON-AA           49.0L ML/MIN 2015 Unkno

wn

 

          GLYCOSYLATED HEMOGLOBIN TEST 85720     A1C HPLC  95135-8   5.6 %     0

3/06/2015 Unknown

 

          COMPLETE BLOOD COUNT 1490622   WBC                 7.2 10e9/L 20

15 Unknown

 

          COMPLETE BLOOD COUNT 7657456   RBC                 4.28 10e12/L 2015 Unknown

 

          COMPLETE BLOOD COUNT 3502297   HGB                 12.8 g/dL 

5 Unknown

 

          COMPLETE BLOOD COUNT [id number]   HCT DET             39.3 %    

5 Unknown

 

          COMPLETE BLOOD COUNT [id number]   MCV                 91.8 fL   

5 Unknown

 

          COMPLETE BLOOD COUNT 0928718   MCH                 29.9 pg   

5 Unknown

 

          COMPLETE BLOOD COUNT 1111714   MCHC                32.6 g/dL 

5 Unknown

 

          COMPLETE BLOOD COUNT 1199329   PLT                 189 10e9/L 20

15 Unknown

 

          COMPLETE BLOOD COUNT [id number]   MPV                 11.2 fL   

5 Unknown

 

          COMPLETE BLOOD COUNT [id number]   ARIK %               38.0 %    

5 Unknown

 

          COMPLETE BLOOD COUNT [id number]   LY %                51.0 %    

5 Unknown

 

          COMPLETE BLOOD COUNT [id number]   MON %               7.7 %     

5 Unknown

 

          COMPLETE BLOOD COUNT 7770575   EOS  %              2.9 %     

5 Unknown

 

          COMPLETE BLOOD COUNT [id number]   BASO %              0.4 %     

5 Unknown

 

          COMPLETE BLOOD COUNT [id number]   RDW                 14.0 %    

5 Unknown

 

          COMPLETE BLOOD COUNT [id number]   ABS ARIK             2.74 10e9/L 

015 Unknown

 

          COMPLETE BLOOD COUNT 1941841   ABS LYMPH           3.67 10e9/L 

015 Unknown

 

          COMPLETE BLOOD COUNT 4130871   ABS MONO            0.55 10e9/L 

015 Unknown

 

          COMPLETE BLOOD COUNT [id number]   ABS EOS             0.21 10e9/L 

015 Unknown

 

          COMPLETE BLOOD COUNT 4209017   ABS BASO            0.03 10e9/L 

015 Unknown

 

          COMPLETE BLOOD COUNT [id number]   RDW-SD              46.1 fL   

5 Unknown

 

          FREE T4   38915     FREE T4             1.14 NG/DL 2015 Unknown

 

          GLYCOSYLATED HEMOGLOBIN TEST 86659     A1C HPLC  81926-4   5.2 %     0

2014 Unknown

 

          FREE T4   82922     FREE T4             1.40 NG/DL 2014 Unknown

 

          GFR CALC  4785077   GFR AA              >60 ML/MIN 2014 Unknown

 

          GFR CALC  4187406   GFR NON-AA           52.0L ML/MIN 2014 Unkno

wn

 

          COMPREHENSIVE METABOLIC 54571     AST                 15 U/L    2014 Unknown

 

          COMPREHENSIVE METABOLIC 81239     ALT                 9 IU/L    2014 Unknown

 

          COMPREHENSIVE METABOLIC 17624     BUN                 17 MG/DL  2014 Unknown

 

          COMPREHENSIVE METABOLIC 04596     ALBUMIN             4.0 GM/DL 2014 Unknown

 

          COMPREHENSIVE METABOLIC 72322     CHLORIDE            112 MMOL/L  Unknown

 

          COMPREHENSIVE METABOLIC 97707     BILI TOT            0.5 MG/DL 2014 Unknown

 

          COMPREHENSIVE METABOLIC 26576     ALK PHOS            66 U/L    2014 Unknown

 

          COMPREHENSIVE METABOLIC 26249     SODIUM              140 MMOL/L  Unknown

 

          COMPREHENSIVE METABOLIC 55799     CREATININE           1.03 MG/DL  Unknown

 

          COMPREHENSIVE METABOLIC 95235     CALCIUM             9.5 MG/DL 2014 Unknown

 

          COMPREHENSIVE METABOLIC 33845     POTASSIUM           4.1 MMOL/L  Unknown

 

          COMPREHENSIVE METABOLIC 92112     PROT TOT            6.2 GM/DL 2014 Unknown

 

          COMPREHENSIVE METABOLIC 71076     Glucose             102 MG/DL 2014 Unknown

 

          COMPREHENSIVE METABOLIC 69109     BICARB              23 MMOL/L 2014 Unknown

 

          COMPREHENSIVE METABOLIC 19168     ANION GAP           5 MEQ/L   2014 Unknown

 

          THYROID STIMULATING HORMONE 93105     TSH                 2.074 uIU/ML

 2014 Unknown

 

          VITAMIN B 12 FOLIC ACID [id number] VIT B 12            423 PG/ML  Unknown

 

          VITAMIN B 12 FOLIC ACID [id number] FOLIC ACID           19.7 NG/ML  Unknown

 

          LIPID GROUP [id number]     HDL TEST            40 MG/DL  2014 Unknown

 

          LIPID GROUP 19812     TRIG                145 MG/DL 2014 Unknown

 

          LIPID GROUP 79407     TEST LDL            81 MG/DL  2014 Unknown

 

          LIPID GROUP 71517     CHOL                150 MG/DL 2014 Unknown

 

          LIPID GROUP 82501     RCHOL/HDL           3.75 RATIO 2014 Unknow

n

 

          COMPLETE BLOOD COUNT [id number]   WBC                 6.0 10e9/L 20

14 Unknown

 

          COMPLETE BLOOD COUNT [id number]   RBC                 4.26 10e12/L 2014 Unknown

 

          COMPLETE BLOOD COUNT 5193305   HGB                 12.7 g/dL 

4 Unknown

 

          COMPLETE BLOOD COUNT [id number]   HCT DET             38.7 %    

4 Unknown

 

          COMPLETE BLOOD COUNT [id number]   MCV                 90.8 fL   

4 Unknown

 

          COMPLETE BLOOD COUNT 5334152   MCH                 29.8 pg   

4 Unknown

 

          COMPLETE BLOOD COUNT [id number]   MCHC                32.8 g/dL 

4 Unknown

 

          COMPLETE BLOOD COUNT [id number]   PLT                 178 10e9/L 20

14 Unknown

 

          COMPLETE BLOOD COUNT [id number]   MPV                 11.7 fL   

4 Unknown

 

          COMPLETE BLOOD COUNT [id number]   ARIK %               30.5 %    

4 Unknown

 

          COMPLETE BLOOD COUNT [id number]   LY %                55.4 %    

4 Unknown

 

          COMPLETE BLOOD COUNT [id number]   MON %               9.0 %     

4 Unknown

 

          COMPLETE BLOOD COUNT [id number]   EOS  %              4.4 %     

4 Unknown

 

          COMPLETE BLOOD COUNT [id number]   BASO %              0.7 %     

4 Unknown

 

          COMPLETE BLOOD COUNT [id number]   RDW                 13.3 %    

4 Unknown

 

          COMPLETE BLOOD COUNT [id number]   ABS ARIK             1.83 10e9/L 

014 Unknown

 

          COMPLETE BLOOD COUNT [id number]   ABS LYMPH           3.32 10e9/L 

014 Unknown

 

          COMPLETE BLOOD COUNT [id number]   ABS MONO            0.54 10e9/L 

014 Unknown

 

          COMPLETE BLOOD COUNT 7535214   ABS EOS             0.26 10e9/L 

014 Unknown

 

          COMPLETE BLOOD COUNT [id number]   ABS BASO            0.04 10e9/L 

014 Unknown

 

          COMPLETE BLOOD COUNT [id number]   RDW-SD              43.2 fL   

4 Unknown

 

          HEMOGLOBIN A1C (GLYCOSYLATED) [id number]   A1C HPLC  46034-8   5.5 %     

2012 Unknown

 

          COMPLETE BLOOD COUNT [id number]   WBC                 6.0 10e9/L 20

12 Unknown

 

          COMPLETE BLOOD COUNT [id number]   RBC                 4.22 10e12/L 2012 Unknown

 

          COMPLETE BLOOD COUNT 5661471   HGB                 12.4 g/dL 

2 Unknown

 

          COMPLETE BLOOD COUNT [id number]   HCT DET             38.2 %    

2 Unknown

 

          COMPLETE BLOOD COUNT [id number]   MCV                 90.5 fL   

2 Unknown

 

          COMPLETE BLOOD COUNT 9531835   MCH                 29.4 pg   

2 Unknown

 

          COMPLETE BLOOD COUNT [id number]   MCHC                32.5 g/dL 

2 Unknown

 

          COMPLETE BLOOD COUNT [id number]   PLT                 187 10e9/L 20

12 Unknown

 

          COMPLETE BLOOD COUNT [id number]   MPV                 11.5 fL   

2 Unknown

 

          COMPLETE BLOOD COUNT [id number]   ARIK %               36.4 %    

2 Unknown

 

          COMPLETE BLOOD COUNT [id number]   LY %                51.0 %    

2 Unknown

 

          COMPLETE BLOOD COUNT [id number]   MON %               8.7 %     

2 Unknown

 

          COMPLETE BLOOD COUNT [id number]   EOS  %              3.2 %     

2 Unknown

 

          COMPLETE BLOOD COUNT 8001374   BASO %              0.7 %     

2 Unknown

 

          COMPLETE BLOOD COUNT [id number]   RDW                 13.7 %    

2 Unknown

 

          COMPLETE BLOOD COUNT 7309984   ABS ARIK             2.18 10e9/L 

012 Unknown

 

          COMPLETE BLOOD COUNT [id number]   ABS LYMPH           3.06 10e9/L 

012 Unknown

 

          COMPLETE BLOOD COUNT [id number]   ABS MONO            0.52 10e9/L 

012 Unknown

 

          COMPLETE BLOOD COUNT [id number]   ABS EOS             0.19 10e9/L 

012 Unknown

 

          COMPLETE BLOOD COUNT [id number]   ABS BASO            0.04 10e9/L 

012 Unknown

 

          COMPLETE BLOOD COUNT 9376700   RDW-SD              44.3 fL   

2 Unknown

 

          LIPID GROUP [id number]     HDL TEST            42 MG/DL  2012 Unknown

 

          LIPID GROUP [id number]     TRIG                156 MG/DL 2012 Unknown

 

          LIPID GROUP [id number]     TEST LDL            80 MG/DL  2012 Unknown

 

          LIPID GROUP [id number]     CHOL                153 MG/DL 2012 Unknown

 

          LIPID GROUP [id number]     RCHOL/HDL           3.64 RATIO 2012 Unknow

n

 

          FREE T4   58753     FREE T4             1.22 NG/DL 2012 Unknown

 

          COMPREHENSIVE METABOLIC 74151     AST                 20 U/L    2012 Unknown

 

          COMPREHENSIVE METABOLIC 35264     ALT                 11 IU/L   2012 Unknown

 

          COMPREHENSIVE METABOLIC 69874     BUN                 19 MG/DL  2012 Unknown

 

          COMPREHENSIVE METABOLIC 61690     ALBUMIN             4.3 GM/DL 2012 Unknown

 

          COMPREHENSIVE METABOLIC 02837     CHLORIDE            109 MMOL/L  Unknown

 

          COMPREHENSIVE METABOLIC 04218     BILI TOT            0.6 MG/DL 2012 Unknown

 

          COMPREHENSIVE METABOLIC 26275     ALK PHOS            84 U/L    2012 Unknown

 

          COMPREHENSIVE METABOLIC 99099     SODIUM              142 MMOL/L  Unknown

 

          COMPREHENSIVE METABOLIC 01721     CREATININE           1.09 MG/DL  Unknown

 

          COMPREHENSIVE METABOLIC 74717     CALCIUM             9.8 MG/DL 2012 Unknown

 

          COMPREHENSIVE METABOLIC 73874     POTASSIUM           4.2 MMOL/L  Unknown

 

          COMPREHENSIVE METABOLIC 82297     PROT TOT            6.4 GM/DL 2012 Unknown

 

          COMPREHENSIVE METABOLIC 62470     Glucose             89 MG/DL  2012 Unknown

 

          COMPREHENSIVE METABOLIC 27984     BICARB              25 MMOL/L 2012 Unknown

 

          COMPREHENSIVE METABOLIC 99933     ANION GAP           8 MEQ/L   2012 Unknown

 

          GFR CALC  3374569   GFR AA              60.0L ML/MIN 2012 Unknow

n

 

          GFR CALC  0849461   GFR NON-AA           49.0L ML/MIN 2012 Unkno

wn

 

          THYROID STIMULATING HORMONE 94756     TSH                 2.450 uIU/ML

 2012 Unknown

 

          COMPREHENSIVE METABOLIC 71246     AST                 22 U/L    2012 Unknown

 

          COMPREHENSIVE METABOLIC 79778     ALT                 14 IU/L   2012 Unknown

 

          COMPREHENSIVE METABOLIC 52219     BUN                 21 MG/DL  2012 Unknown

 

          COMPREHENSIVE METABOLIC 60219     ALBUMIN             4.3 GM/DL 2012 Unknown

 

          COMPREHENSIVE METABOLIC 84014     CHLORIDE            106 MMOL/L  Unknown

 

          COMPREHENSIVE METABOLIC 19540     BILI TOT            0.4 MG/DL 2012 Unknown

 

          COMPREHENSIVE METABOLIC 20193     ALK PHOS            80 U/L    2012 Unknown

 

          COMPREHENSIVE METABOLIC 97411     SODIUM              141 MMOL/L  Unknown

 

          COMPREHENSIVE METABOLIC 27870     CREATININE           1.13 MG/DL  Unknown

 

          COMPREHENSIVE METABOLIC 69042     CALCIUM             9.4 MG/DL 2012 Unknown

 

          COMPREHENSIVE METABOLIC 57828     POTASSIUM           4.3 MMOL/L  Unknown

 

          COMPREHENSIVE METABOLIC 25979     PROT TOT            6.7 GM/DL 2012 Unknown

 

          COMPREHENSIVE METABOLIC 49572     Glucose             98 MG/DL  2012 Unknown

 

          COMPREHENSIVE METABOLIC 46838     BICARB              25 MMOL/L 2012 Unknown

 

          COMPREHENSIVE METABOLIC 12202     ANION GAP           10 MEQ/L  2012 Unknown

 

          LIPID GROUP [id number]     HDL TEST            44 MG/DL  2012 Unknown

 

          LIPID GROUP [id number]     TRIG                164 MG/DL 2012 Unknown

 

          LIPID GROUP [id number]     TEST LDL            98 MG/DL  2012 Unknown

 

          LIPID GROUP [id number]     CHOL                175 MG/DL 2012 Unknown

 

          LIPID GROUP [id number]     RCHOL/HDL           3.98 RATIO 2012 Unknow

n

 

          COMPLETE BLOOD COUNT 17747     WBC                 6.7 10e9/L 20

12 Unknown

 

          COMPLETE BLOOD COUNT 60035     RBC                 4.36 10e12/L 2012 Unknown

 

          COMPLETE BLOOD COUNT 57050     HGB                 12.9 g/dL 

2 Unknown

 

          COMPLETE BLOOD COUNT 64935     HCT DET             39.4 %    

2 Unknown

 

          COMPLETE BLOOD COUNT 70457     MCV                 90.4 fL   

2 Unknown

 

          COMPLETE BLOOD COUNT 55630     MCH                 29.6 pg   

2 Unknown

 

          COMPLETE BLOOD COUNT 67453     MCHC                32.7 g/dL 

2 Unknown

 

          COMPLETE BLOOD COUNT 05596     PLT                 184 10e9/L 20

12 Unknown

 

          COMPLETE BLOOD COUNT 69266     MPV                 10.9 fL   

2 Unknown

 

          COMPLETE BLOOD COUNT 83288     ARIK %               41.5 %    

2 Unknown

 

          COMPLETE BLOOD COUNT 17538     LY %                45.7 %    

2 Unknown

 

          COMPLETE BLOOD COUNT 53259     MON %               9.4 %     

2 Unknown

 

          COMPLETE BLOOD COUNT 52146     EOS  %              3.0 %     

2 Unknown

 

          COMPLETE BLOOD COUNT 79082     BASO %              0.4 %     

2 Unknown

 

          COMPLETE BLOOD COUNT 71660     RDW                 13.2 %    

2 Unknown

 

          COMPLETE BLOOD COUNT 55236     ABS ARIK             2.78 10e9/L 

012 Unknown

 

          COMPLETE BLOOD COUNT 28227     ABS LYMPH           3.06 10e9/L 

012 Unknown

 

          COMPLETE BLOOD COUNT 59912     ABS MONO            0.63 10e9/L 

012 Unknown

 

          COMPLETE BLOOD COUNT 29504     ABS EOS             0.20 10e9/L 

012 Unknown

 

          COMPLETE BLOOD COUNT 34924     ABS BASO            0.03 10e9/L 

012 Unknown

 

          COMPLETE BLOOD COUNT 02268     RDW-SD              42.3 fL   

2 Unknown

 

          GFR CALC  6623476   GFR AA              57.0L ML/MIN 2012 Unknow

n

 

          GFR CALC  9329288   GFR NON-AA           47.0L ML/MIN 2012 Unkno

wn

 

          THYROID STIMULATING HORMONE 36158     TSH                 2.663 uIU/ML

 2012 Unknown

 

          FREE T4   56719     FREE T4             1.15 NG/DL 2012 Unknown

 

          THYROID STIMULATING HORMONE 29438     TSH                 1.908 uIU/ML

 2011 Unknown

 

          COMPLETE BLOOD COUNT 53499     WBC                 6.4 10e9/L 20

11 Unknown

 

          COMPLETE BLOOD COUNT 01138     RBC                 3.92 10e12/L 2011 Unknown

 

          COMPLETE BLOOD COUNT 25227     HGB                 11.8 g/dL 

1 Unknown

 

          COMPLETE BLOOD COUNT 46071     HCT DET             36.0 %    

1 Unknown

 

          COMPLETE BLOOD COUNT 44614     MCV                 91.8 fL   

1 Unknown

 

          COMPLETE BLOOD COUNT 55332     MCH                 30.1 pg   

1 Unknown

 

          COMPLETE BLOOD COUNT 02049     MCHC                32.8 g/dL 

1 Unknown

 

          COMPLETE BLOOD COUNT 99938     PLT                 176 10e9/L 20

11 Unknown

 

          COMPLETE BLOOD COUNT 59662     MPV                 11.4 fL   

1 Unknown

 

          COMPLETE BLOOD COUNT 87573     ARIK %               50.4 %    

1 Unknown

 

          COMPLETE BLOOD COUNT 03544     LY %                35.5 %    

1 Unknown

 

          COMPLETE BLOOD COUNT 93414     MON %               10.2 %    

1 Unknown

 

          COMPLETE BLOOD COUNT 46210     EOS  %              3.3 %     

1 Unknown

 

          COMPLETE BLOOD COUNT 03864     BASO %              0.6 %     

1 Unknown

 

          COMPLETE BLOOD COUNT 02563     RDW                 13.7 %    

1 Unknown

 

          COMPLETE BLOOD COUNT 67098     ABS ARIK             3.23 10e9/L 

011 Unknown

 

          COMPLETE BLOOD COUNT 08355     ABS LYMPH           2.27 10e9/L 

011 Unknown

 

          COMPLETE BLOOD COUNT 07361     ABS MONO            0.65 10e9/L 

011 Unknown

 

          COMPLETE BLOOD COUNT 66770     ABS EOS             0.21 10e9/L 

011 Unknown

 

          COMPLETE BLOOD COUNT 59798     ABS BASO            0.04 10e9/L 

011 Unknown

 

          COMPLETE BLOOD COUNT 66318     RDW-SD              45.3 fL   

1 Unknown

 

          GFR CALC  1297180   GFR AA              >60 ML/MIN 2011 Unknown

 

          GFR CALC  3775303   GFR NON-AA           53.0L ML/MIN 2011 Unkno

wn

 

          FREE T4   53662     FREE T4             1.20 NG/DL 2011 Unknown

 

          COMPREHENSIVE METABOLIC 15760     AST                 17 U/L    2011 Unknown

 

          COMPREHENSIVE METABOLIC 29247     ALT                 9 IU/L    2011 Unknown

 

          COMPREHENSIVE METABOLIC 17063     BUN                 16 MG/DL  2011 Unknown

 

          COMPREHENSIVE METABOLIC 23254     ALBUMIN             4.0 GM/DL 2011 Unknown

 

          COMPREHENSIVE METABOLIC 77151     CHLORIDE            108 MMOL/L  Unknown

 

          COMPREHENSIVE METABOLIC 36639     BILI TOT            0.5 MG/DL 2011 Unknown

 

          COMPREHENSIVE METABOLIC 63786     ALK PHOS            76 U/L    2011 Unknown

 

          COMPREHENSIVE METABOLIC 46530     SODIUM              139 MMOL/L  Unknown

 

          COMPREHENSIVE METABOLIC 72548     CREATININE           1.02 MG/DL 2011 Unknown

 

          COMPREHENSIVE METABOLIC 47858     CALCIUM             9.2 MG/DL 2011 Unknown

 

          COMPREHENSIVE METABOLIC 00338     POTASSIUM           4.5 MMOL/L  Unknown

 

          COMPREHENSIVE METABOLIC 94277     PROT TOT            6.1 GM/DL 2011 Unknown

 

          COMPREHENSIVE METABOLIC 03585     Glucose             93 MG/DL  2011 Unknown

 

          COMPREHENSIVE METABOLIC 86879     BICARB              26 MMOL/L 2011 Unknown

 

          COMPREHENSIVE METABOLIC 16645     ANION GAP           5 MEQ/L   2011 Unknown

 

          LIPID GROUP 34820     HDL TEST            46 MG/DL  2011 Unknown

 

          LIPID GROUP 61089     TRIG                102 MG/DL 2011 Unknown

 

          LIPID GROUP 21393     TEST LDL            88 MG/DL  2011 Unknown

 

          LIPID GROUP 77184     CHOL                154 MG/DL 2011 Unknown

 

          LIPID GROUP [id number]     RCHOL/HDL           3.35 RATIO 2011 Unknow

n







Procedures





                    Procedure           Codes               Date

 

                    ROUTINE VENIPUNCTURE CPT-4: 81503        2020

 

                    ASSAY THYROID STIM HORMONE CPT-4: 65970        2020

 

                    COMPLETE CBC W/AUTO DIFF WBC CPT-4: 88552        2020

 

                    COMPREHEN METABOLIC PANEL CPT-4: 11299        2020

 

                    ROUTINE VENIPUNCTURE CPT-4: 79271        2019

 

                    LIPID PANEL         CPT-4: 38424        2019

 

                    FLU VACC PRSV FREE INC ANTIG 65 AND OLDER CPT-4: 36144      

  10/22/2019

 

                    FLU VACC PRSV FREE INC ANTIG 65 AND OLDER CPT-4: 39628      

  10/22/2019

 

                    ADMIN INFLUENZA VIRUS VAC CPT-4:         10/22/2019

 

                    COMPREHEN METABOLIC PANEL CPT-4: 66712        10/11/2019

 

                    ROUTINE VENIPUNCTURE CPT-4: 78233        10/11/2019

 

                    ROUTINE VENIPUNCTURE CPT-4: 15461        06/10/2019

 

                    ASSAY THYROID STIM HORMONE CPT-4: 32888        06/10/2019

 

                    COMPREHEN METABOLIC PANEL CPT-4: 45194        06/10/2019

 

                    COMPLETE CBC W/AUTO DIFF WBC CPT-4: 57098        06/10/2019

 

                    URINALYSIS NONAUTO W/O SCOPE CPT-4: 08659        2019

 

                    URINE CULTURE/ COLONY COUNT CPT-4: 06053        2019

 

                    URINE CULTURE/ COLONY COUNT CPT-4: 16830        10/02/2018

 

                    ROUTINE VENIPUNCTURE CPT-4: 60326        2018

 

                    ASSAY OF FREE THYROXINE CPT-4: 18067        2018

 

                    ASSAY THYROID STIM HORMONE CPT-4: 93365        2018

 

                    COMPLETE CBC W/AUTO DIFF WBC CPT-4: 31547        2018

 

                    METABOLIC PANEL TOTAL CA CPT-4: 46553        2018

 

                    FLU VACC PRSV FREE INC ANTIG 65 AND OLDER CPT-4: 06879      

  2018

 

                    ASSAY, GLUCOSE, BLOOD QUANT CPT-4: 38559        2018

 

                    ADMIN INFLUENZA VIRUS VAC CPT-4:         2018

 

                    ROUTINE VENIPUNCTURE CPT-4: 67819        2018

 

                    COMPREHEN METABOLIC PANEL CPT-4: 61159        2018

 

                    COMPLETE CBC W/AUTO DIFF WBC CPT-4: 63175        2018

 

                    A1C HPLC            CPT-4: 68447        2018

 

                    ASSAY OF TROPONIN QUANT CPT-4: 26335        2018

 

                    LIPID PANEL         CPT-4: 17699        2018

 

                    THER/PROPH/DIAG INJ SC/IM CPT-4: 46912        2018

 

                    TRIAMCINOLONE ACET INJ NOS CPT-4:         2018

 

                    URINALYSIS NONAUTO W/O SCOPE CPT-4: 14893        2018

 

                    URINE CULTURE/ COLONY COUNT CPT-4: 22041        2018

 

                    FLU VACC PRSV FREE INC ANTIG 65 AND OLDER CPT-4: 86018      

  10/06/2017

 

                    ADMIN INFLUENZA VIRUS VAC CPT-4:         10/06/2017

 

                    ROUTINE VENIPUNCTURE CPT-4: 71821        2017

 

                    COMPREHEN METABOLIC PANEL CPT-4: 92485        2017

 

                    COMPLETE CBC W/AUTO DIFF WBC CPT-4: 92059        2017

 

                    LIPID PANEL         CPT-4: 45698        2017

 

                    A1C HPLC            CPT-4: 23145        2017

 

                    ASSAY THYROID STIM HORMONE CPT-4: 76106        2017

 

                    ROUTINE VENIPUNCTURE CPT-4: 36167        2017

 

                    ASSAY THYROID STIM HORMONE CPT-4: 46181        2017

 

                    COMPREHEN METABOLIC PANEL CPT-4: 13221        2017

 

                    COMPLETE CBC W/AUTO DIFF WBC CPT-4: 53488        2017

 

                    A1C HPLC            CPT-4: 12425        2017

 

                    FLU VACC PRSV FREE INC ANTIG 65 AND OLDER CPT-4: 84315      

  10/07/2016

 

                    ADMIN INFLUENZA VIRUS VAC CPT-4:         10/07/2016

 

                    ROUTINE VENIPUNCTURE CPT-4: 61236        2016

 

                    ASSAY OF FREE THYROXINE CPT-4: 54203        2016

 

                    ASSAY THYROID STIM HORMONE CPT-4: 29707        2016

 

                    COMPREHEN METABOLIC PANEL CPT-4: 68667        2016

 

                    COMPLETE CBC W/AUTO DIFF WBC CPT-4: 68034        2016

 

                    LIPID PANEL         CPT-4: 89449        2016

 

                    A1C HPLC            CPT-4: 69906        2016

 

                    URINALYSIS NONAUTO W/O SCOPE CPT-4: 74393        2016

 

                    ROUTINE VENIPUNCTURE CPT-4: 27909        2015

 

                    METABOLIC PANEL TOTAL CA CPT-4: 74750        2015

 

                    PRESCRIP TRANSMIT VIA ERX SY CPT-4:         2015

 

                    FLU VACC PRSV FREE INC ANTIG 65 AND OLDER CPT-4: 96484      

  10/16/2015

 

                    ADMIN INFLUENZA VIRUS VAC CPT-4:         10/16/2015

 

                    URINALYSIS NONAUTO W/O SCOPE CPT-4: 79190        09/15/2015

 

                    URINE CULTURE/ COLONY COUNT CPT-4: 67116        09/15/2015

 

                    ROUTINE VENIPUNCTURE CPT-4: 83623        09/10/2015

 

                    ASSAY OF FREE THYROXINE CPT-4: 61340        09/10/2015

 

                    ASSAY THYROID STIM HORMONE CPT-4: 80973        09/10/2015

 

                    COMPREHEN METABOLIC PANEL CPT-4: 15349        09/10/2015

 

                    COMPLETE CBC W/AUTO DIFF WBC CPT-4: 35989        09/10/2015

 

                    LIPID PANEL         CPT-4: 71361        09/10/2015

 

                    A1C HPLC            CPT-4: 71143        09/10/2015

 

                    CERUM REMOVAL       CPT-4: 46788        2015

 

                    PRESCRIP TRANSMIT VIA ERX SY CPT-4:         2015

 

                    FLUZONE, 5ML (Medicare) CPT-4:         10/17/2014

 

                    ADMIN INFLUENZA VIRUS VAC CPT-4:         10/17/2014

 

                    PRESCRIP TRANSMIT VIA ERX SY CPT-4:         2014

 

                    PRESCRIP TRANSMIT VIA ERX SY CPT-4:         2014

 

                    PRESCRIP TRANSMIT VIA ERX SY CPT-4:         2014

 

                    ROUTINE VENIPUNCTURE CPT-4: 19860        2014

 

                    ASSAY OF FREE THYROXINE CPT-4: 53007        2014

 

                    ASSAY THYROID STIM HORMONE CPT-4: 73832        2014

 

                    COMPREHEN METABOLIC PANEL CPT-4: 43575        2014

 

                    COMPLETE CBC W/AUTO DIFF WBC CPT-4: 11040        2014

 

                    LIPID PANEL         CPT-4: 93850        2014

 

                    A1C HPLC            CPT-4: 70595        2014

 

                    VITAMIN B 12 FOLIC ACID CPT-4: 64453|83059  2014

 

                    PRESCRIP TRANSMIT VIA ERX SY CPT-4:         2014

 

                    PRESCRIP TRANSMIT VIA ERX SY CPT-4:         10/15/2013

 

                    FLUZONE, 5ML (Medicare) CPT-4:         2013

 

                    ADMIN INFLUENZA VIRUS VAC CPT-4:         2013

 

                    PRESCRIP TRANSMIT VIA ERX SY CPT-4:         2013

 

                    PRESCRIP TRANSMIT VIA ERX SY CPT-4:         05/10/2013

 

                    ROUTINE VENIPUNCTURE CPT-4: 76507        2012

 

                    ASSAY OF FREE THYROXINE CPT-4: 34481        2012

 

                    ASSAY THYROID STIM HORMONE CPT-4: 20905        2012

 

                    COMPREHEN METABOLIC PANEL CPT-4: 06796        2012

 

                    COMPLETE CBC W/AUTO DIFF WBC CPT-4: 85253        2012

 

                    LIPID PANEL         CPT-4: 55991        2012

 

                    A1C GLYCOSYLATED HEMOGLOBIN TEST CPT-4: 36654        

012

 

                    CERUM REMOVAL       CPT-4: 78107        2012

 

                    PRESCRIP TRANSMIT VIA ERX SY CPT-4:         2012

 

                    PRESCRIP TRANSMIT VIA ERX SY CPT-4:         10/10/2012

 

                    FLUZONE, 5ML (Medicare) CPT-4:         2012

 

                    ADMIN INFLUENZA VIRUS VAC CPT-4:         2012

 

                    ASSAY, GLUCOSE, BLOOD QUANT CPT-4: 53607        2012

 

                    URINALYSIS NONAUTO W/O SCOPE CPT-4: 29839        2012

 

                    URINE CULTURE/ COLONY COUNT CPT-4: 20073        2012

 

                    ROUTINE VENIPUNCTURE CPT-4: 41351        2012

 

                    ASSAY OF FREE THYROXINE CPT-4: 16280        2012

 

                    ASSAY THYROID STIM HORMONE CPT-4: 32160        2012

 

                    COMPREHEN METABOLIC PANEL CPT-4: 96271        2012

 

                    COMPLETE CBC W/AUTO DIFF WBC CPT-4: 23909        2012

 

                    LIPID PANEL         CPT-4: 52899        2012

 

                    ASSAY OF INSULIN    CPT-4: 80554        2012

 

                    A1C GLYCOSYLATED HEMOGLOBIN TEST CPT-4: 07559        

012

 

                    DRAIN/INJECT JOINT/BURSA CPT-4: 06562        2012

 

                    METHYLPREDNISOLONE 40 MG INJ CPT-4:         2012

 

                    TRIAMCINOLONE ACET INJ NOS CPT-4:         2012

 

                    PRESCRIP TRANSMIT VIA ERX SY CPT-4:         2012

 

                    PRESCRIP TRANSMIT VIA ERX SY CPT-4:         2012

 

                    METHYLPREDNISOLONE 40 MG INJ CPT-4:         2012

 

                    DRAIN/INJECT JOINT/BURSA CPT-4: 38598        2012

 

                    TRIAMCINOLONE ACET INJ NOS CPT-4:         2012

 

                    PRESCRIP TRANSMIT VIA ERX SY CPT-4:         2012

 

                    ROUTINE VENIPUNCTURE CPT-4: 69304        2012

 

                    ASSAY OF FREE THYROXINE CPT-4: 76572        2012

 

                    ASSAY THYROID STIM HORMONE CPT-4: 03889        2012

 

                    COMPREHEN METABOLIC PANEL CPT-4: 09346        2012

 

                    COMPLETE CBC W/AUTO DIFF WBC CPT-4: 41559        2012

 

                    LIPID PANEL         CPT-4: 36327        2012

 

                    PRESCRIP TRANSMIT VIA ERX SY CPT-4:         2012

 

                    CERUM REMOVAL       CPT-4: 94027        2011

 

                    PRESCRIP TRANSMIT VIA ERX SY CPT-4:         2011

 

                    FLUZONE, 5ML (Medicare) CPT-4:         10/05/2011

 

                    ADMIN INFLUENZA VIRUS VAC CPT-4:         10/05/2011

 

                    PRESCRIP TRANSMIT VIA ERX SY CPT-4:         2011

 

                    URINALYSIS NONAUTO W/O SCOPE CPT-4: 68997        2011

 

                    URINE CULTURE/ COLONY COUNT CPT-4: 87149        2011

 

                    CUR TOBACCO NON-USER CPT-4:         2011

 

                    ROUTINE VENIPUNCTURE CPT-4: 93954        2011

 

                    COMPLETE CBC W/AUTO DIFF WBC CPT-4: 96863        2011

 

                    COMPREHEN METABOLIC PANEL CPT-4: 13746        2011

 

                    LIPID PANEL         CPT-4: 55349        2011

 

                    ASSAY THYROID STIM HORMONE CPT-4: 46260        2011

 

                    ASSAY OF FREE THYROXINE CPT-4: 77063        2011

 

                    PRESCRIP TRANSMIT VIA ERX SY CPT-4:         2011

 

                    INJ TRIGGER POINT 1/2 MUSCL CPT-4: 99235        2011

 

                    TRIAMCINOLONE ACET INJ NOS CPT-4:         2011

 

                    METHYLPREDNISOLONE 40 MG INJ CPT-4:         2011

 

                    THER/PROPH/DIAG INJ SC/IM CPT-4: 42527        2011

 

                    KETOROLAC TROMETHAMINE INJ CPT-4:         2011

 

                    PRESCRIP TRANSMIT VIA ERX SY CPT-4:         2010

 

                    FLU VACCINE 3 YRS & > IM UP 64 CPT-4: 16415        10/06/201

0

 

                    ADMIN INFLUENZA VIRUS VAC CPT-4:         10/06/2010

 

                    URINALYSIS NONAUTO W/O SCOPE CPT-4: 04594        2010

 

                    URINE CULTURE/ COLONY COUNT CPT-4: 38731        2010

 

                    PRESCRIP TRANSMIT VIA ERX SY CPT-4:         2010

 

                    THER/PROPH/DIAG INJ SC/IM CPT-4: 93563        2010

 

                    VITAMIN B12 INJECTION CPT-4:         2010

 

                    THER/PROPH/DIAG INJ SC/IM CPT-4: 56894        2010

 

                    VITAMIN B12 INJECTION CPT-4:         2010

 

                    ROUTINE VENIPUNCTURE CPT-4: 88511        2010







Vital Signs





                          Date                      Vital

 

                2020      Blood Pressure 1: 138/64 Code: 8480-6 Heart Rate

 1: 52 bpm 

Respiratory Rate: 18 bpm  SpO2: 98%                 Temperature: 36.3 (C) / 97.4

 (F)

 

                    2020          Blood Pressure 1: 144/82 Code: 8480-6 He

art Rate 1: 56 bpm

 

                2020      Blood Pressure 1: 154/86 Code: 8480-6 Heart Rate

 1: 64 bpm 

Respiratory Rate: 20 bpm SpO2: 99%           Temperature: 37.1 (C) / 98.7 (F) We

ight: 215 

lbs 

 

             2019   Blood Pressure 1: 140/70 Code: 8480-6 Heart Rate 1: 57

 bpm SpO2: 99%    

Temperature: 35.9 (C) / 96.6 (F)        Weight: 215 lbs 

 

                06/10/2019      Blood Pressure 1: 144/70 Code: 8480-6 Heart Rate

 1: 60 bpm 

Respiratory Rate: 20 bpm SpO2: 95%           Temperature: 36.4 (C) / 97.6 (F) We

ight: 214 

lbs 

 

                2019      Blood Pressure 1: 128/78 Code: 8480-6 Heart Rate

 1: 56 bpm 

Respiratory Rate: 20 bpm SpO2: 98%           Temperature: 36.3 (C) / 97.4 (F) We

ight: 213 

lbs 

 

                2018      Blood Pressure 1: 142/60 Code: 8480-6 BMI: 38.2 

Code: 38372-2 Heart 

Rate 1: 48 bpm  Height: 5'2"    Respiratory Rate: 20 bpm SpO2: 98%       Tempera

ture: 36.7

(C) / 98.1 (F)                          Weight: 212 lbs 

 

                2018      Blood Pressure 1: 142/64 Code: 8480-6 BMI: 38.5 

Code: 76539-4 Heart 

Rate 1: 52 bpm  Height: 5'2"    Respiratory Rate: 22 bpm SpO2: 96%       Tempera

ture: 36.1

(C) / 96.9 (F)                          Weight: 214 lbs 

 

                10/02/2018      Blood Pressure 1: 124/80 Code: 8480-6 BMI: 38.3 

Code: 72543-1 Heart 

Rate 1: 68 bpm      Height: 5'2"        Respiratory Rate: 20 bpm Temperature: 36

.3 (C) / 

97.4 (F)                                Weight: 213 lbs 

 

                2018      Blood Pressure 1: 132/78 Code: 8480-6 BMI: 37.6 

Code: 79947-2 Heart 

Rate 1: 68 bpm  Height: 5'2"    Respiratory Rate: 20 bpm SpO2: 97%       Tempera

ture: 36.8

(C) / 98.2 (F)                          Weight: 209 lbs 

 

                2018      Blood Pressure 1: 150/76 Code: 8480-6 BMI: 38.5 

Code: 85505-5 Heart 

Rate 1: 64 bpm  Height: 5'2"    Respiratory Rate: 20 bpm SpO2: 97%       Tempera

ture: 36.2

(C) / 97.2 (F)                          Weight: 214 lbs 

 

                2018      Blood Pressure 1: 122/74 Code: 8480-6 BMI: 38.2 

Code: 11976-1 Heart 

Rate 1: 64 bpm  Height: 5'2"    Respiratory Rate: 18 bpm SpO2: 96%       Tempera

ture: 35.8

(C) / 96.4 (F)                          Weight: 212 lbs 

 

                2018      Blood Pressure 1: 124/78 Code: 8480-6 BMI: 37.8 

Code: 80833-2 Heart 

Rate 1: 76 bpm      Height: 5'2"        Respiratory Rate: 20 bpm Temperature: 36

.8 (C) / 

98.3 (F)                                Weight: 210 lbs 

 

                2018      Blood Pressure 1: 136/70 Code: 8480-6 BMI: 38.0 

Code: 87999-7 Heart 

Rate 1: 68 bpm  Height: 5'2"    Respiratory Rate: 20 bpm SpO2: 97%       Tempera

ture: 36.8

(C) / 98.2 (F)                          Weight: 211 lbs 

 

                2018      Blood Pressure 1: 140/65 Code: 8480-6 Heart Rate

 1: 75 bpm 

Respiratory Rate: 24 bpm SpO2: 95%           Temperature: 37.0 (C) / 98.6 (F) We

ight: 211 

lbs 

 

                2018      Blood Pressure 1: 154/70 Code: 8480-6 BMI: 37.6 

Code: 65995-2 Heart 

Rate 1: 76 bpm  Height: 5'2"    Respiratory Rate: 20 bpm SpO2: 98%       Tempera

ture: 36.9

(C) / 98.5 (F)                          Weight: 209 lbs 

 

                2017      Blood Pressure 1: 156/70 Code: 8480-6 BMI: 37.1 

Code: 85824-7 Heart 

Rate 1: 72 bpm  Height: 5'2"    Respiratory Rate: 20 bpm SpO2: 97%       Tempera

ture: 37.0

(C) / 98.6 (F)                          Weight: 206 lbs 

 

                2017      Blood Pressure 1: 152/78 Code: 8480-6 BMI: 36.8 

Code: 82351-8 Heart 

Rate 1: 78 bpm  Height: 5'2"    Respiratory Rate: 20 bpm SpO2: 98%       Tempera

ture: 36.1

(C) / 97.0 (F)                          Weight: 204 lbs 

 

                2017      Blood Pressure 1: 142/70 Code: 8480-6 BMI: 36.9 

Code: 30717-9 Heart 

Rate 1: 64 bpm  Height: 5'2"    Respiratory Rate: 20 bpm SpO2: 96%       Tempera

ture: 36.5

(C) / 97.7 (F)                          Weight: 205 lbs 

 

                2017      Blood Pressure 1: 142/68 Code: 8480-6 Heart Rate

 1: 88 bpm 

Respiratory Rate: 18 bpm SpO2: 98%           Temperature: 36.3 (C) / 97.3 (F) We

ight: 209 

lbs 

 

                2016      Blood Pressure 1: 130/78 Code: 8480-6 Heart Rate

 1: 68 bpm 

Respiratory Rate: 20 bpm SpO2: 95%           Temperature: 36.4 (C) / 97.6 (F) We

ight: 212 

lbs 

 

                2016      Blood Pressure 1: 122/64 Code: 8480-6 BMI: 39.1 

Code: 57244-0 Heart 

Rate 1: 76 bpm      Height: 5'2"        Respiratory Rate: 20 bpm Temperature: 36

.8 (C) / 

98.2 (F)                                Weight: 217 lbs 

 

                2016      Blood Pressure 1: 144/70 Code: 8480-6 BMI: 39.4 

Code: 84755-4 Heart 

Rate 1: 76 bpm      Height: 5'2"        Respiratory Rate: 20 bpm Temperature: 36

.6 (C) / 

97.9 (F)                                Weight: 219 lbs 

 

                2015      Blood Pressure 1: 152/60 Code: 8480-6 BMI: 39.6 

Code: 58094-4 Heart 

Rate 1: 84 bpm      Height: 5'2"        Respiratory Rate: 20 bpm Temperature: 37

.0 (C) / 

98.6 (F)                                Weight: 220 lbs 

 

                2015      Blood Pressure 1: 146/76 Code: 8480-6 BMI: 39.8 

Code: 33586-9 Heart 

Rate 1: 88 bpm      Height: 5'2"        Respiratory Rate: 20 bpm Temperature: 37

.0 (C) / 

98.6 (F)                                Weight: 221 lbs 

 

                2015      Blood Pressure 1: 132/70 Code: 8480-6 BMI: 39.1 

Code: 09043-6 Heart 

Rate 1: 88 bpm      Height: 5'2"        Respiratory Rate: 20 bpm Temperature: 36

.4 (C) / 

97.6 (F)                                Weight: 217 lbs 

 

                2015      Blood Pressure 1: 132/66 Code: 8480-6 BMI: 39.9 

Code: 07414-8 Heart 

Rate 1: 72 bpm      Height: 5'2"        Respiratory Rate: 20 bpm Temperature: 36

.9 (C) / 

98.4 (F)                                Weight: 218 lbs 

 

                2015      Blood Pressure 1: 136/80 Code: 8480-6 Heart Rate

 1: 76 bpm 

Respiratory Rate: 20 bpm  Temperature: 36.7 (C) / 98.0 (F) Weight: 224 lbs 

 

                2015      Blood Pressure 1: 134/78 Code: 8480-6 BMI: 39.7 

Code: 16997-9 Heart 

Rate 1: 84 bpm      Height: 5'2"        Respiratory Rate: 20 bpm Temperature: 36

.7 (C) / 

98.0 (F)                                Weight: 217 lbs 

 

                2014      Blood Pressure 1: 146/78 Code: 8480-6 BMI: 39.5 

Code: 02312-4 Heart 

Rate 1: 82 bpm      Height: 5'2"        Respiratory Rate: 18 bpm Temperature: 35

.6 (C) / 

96.1 (F)                                Weight: 216 lbs 

 

                2014      Blood Pressure 1: 134/70 Code: 8480-6 BMI: 37.9 

Code: 49636-2 Heart 

Rate 1: 80 bpm      Height: 5'3"        Respiratory Rate: 20 bpm Temperature: 36

.8 (C) / 

98.2 (F)                                Weight: 214 lbs 

 

                2014      Blood Pressure 1: 132/70 Code: 8480-6 BMI: 37.6 

Code: 69901-4 Heart 

Rate 1: 80 bpm      Height: 5'3"        Respiratory Rate: 20 bpm Temperature: 36

.8 (C) / 

98.3 (F)                                Weight: 212 lbs 

 

                2014      Blood Pressure 1: 116/74 Code: 8480-6 Heart Rate

 1: 68 bpm 

Respiratory Rate: 20 bpm  Temperature: 36.2 (C) / 97.1 (F) Weight: 212 lbs 

 

                10/15/2013      Blood Pressure 1: 132/82 Code: 8480-6 BMI: 37.4 

Code: 44505-9 Heart 

Rate 1: 76 bpm      Height: 5'3"        Respiratory Rate: 20 bpm Temperature: 36

.7 (C) / 

98.0 (F)                                Weight: 211 lbs 

 

                    2013          Blood Pressure 1: 130/76 Code: 8480-6 He

art Rate 1: 78 bpm

 

                2013      Blood Pressure 1: 140/82 Code: 8480-6 BMI: 36.8 

Code: 32010-8 Heart 

Rate 1: 66 bpm      Height: 5'3"        Respiratory Rate: 20 bpm Temperature: 36

.1 (C) / 

96.9 (F)                                Weight: 208 lbs 

 

                2013      Blood Pressure 1: 138/80 Code: 8480-6 BMI: 36.4 

Code: 59989-8 Heart 

Rate 1: 72 bpm      Height: 5'4"        Respiratory Rate: 20 bpm Temperature: 36

.7 (C) / 

98.0 (F)                                Weight: 212 lbs 

 

                2013      Blood Pressure 1: 124/70 Code: 8480-6 BMI: 36.9 

Code: 59273-0 Heart 

Rate 1: 60 bpm      Height: 5'4"        Temperature: 36.1 (C) / 97.0 (F) Weight:

 215 lbs 

 

                05/10/2013      Blood Pressure 1: 132/86 Code: 8480-6 BMI: 36.9 

Code: 34133-8 Heart 

Rate 1: 76 bpm      Height: 5'4"        Respiratory Rate: 20 bpm Temperature: 36

.8 (C) / 

98.2 (F)                                Weight: 215 lbs 

 

                2013      Blood Pressure 1: 134/82 Code: 8480-6 BMI: 36.6 

Code: 26067-3 Heart 

Rate 1: 72 bpm      Height: 5'4"        Respiratory Rate: 20 bpm Temperature: 36

.3 (C) / 

97.4 (F)                                Weight: 213 lbs 

 

                2012      Blood Pressure 1: 142/80 Code: 8480-6 BMI: 37.1 

Code: 29996-8 Heart 

Rate 1: 76 bpm      Height: 5'4"        Respiratory Rate: 20 bpm Temperature: 36

.8 (C) / 

98.3 (F)                                Weight: 216 lbs 

 

                10/23/2012      Blood Pressure 1: 128/68 Code: 8480-6 BMI: 37.6 

Code: 77449-1 Heart 

Rate 1: 72 bpm      Height: 5'4"        Respiratory Rate: 20 bpm Temperature: 36

.6 (C) / 

97.9 (F)                                Weight: 219 lbs 

 

                10/10/2012      Blood Pressure 1: 122/70 Code: 8480-6 Heart Rate

 1: 76 bpm 

Respiratory Rate: 20 bpm  Temperature: 36.7 (C) / 98.1 (F) Weight: 218 lbs 

 

                    2012          Blood Pressure 1: 132/80 Code: 8480-6 He

art Rate 1: 84 bpm

 

                2012      Blood Pressure 1: 128/78 Code: 8480-6 BMI: 38.1 

Code: 87025-9 Heart 

Rate 1: 84 bpm      Height: 5'4"        Respiratory Rate: 20 bpm Temperature: 36

.9 (C) / 

98.4 (F)                                Weight: 222 lbs 

 

                2012      Blood Pressure 1: 138/80 Code: 8480-6 BMI: 37.9 

Code: 63090-8 Heart 

Rate 1: 74 bpm      Height: 5'4"        Temperature: 36.1 (C) / 97.0 (F) Weight:

 221 lbs 

 

                2012      Blood Pressure 1: 126/78 Code: 8480-6 BMI: 38.4 

Code: 08981-4 Heart 

Rate 1: 72 bpm      Height: 5'4"        Respiratory Rate: 20 bpm Temperature: 36

.7 (C) / 

98.0 (F)                                Weight: 224 lbs 

 

                2012      Blood Pressure 1: 138/72 Code: 8480-6 BMI: 38.4 

Code: 27224-2 Heart 

Rate 1: 72 bpm      Height: 5'4"        Respiratory Rate: 20 bpm Temperature: 36

.6 (C) / 

97.9 (F)                                Weight: 224 lbs 

 

                2012      Blood Pressure 1: 122/78 Code: 8480-6 BMI: 38.8 

Code: 52207-7 Heart 

Rate 1: 88 bpm      Height: 5'4"        Respiratory Rate: 20 bpm Temperature: 36

.6 (C) / 

97.8 (F)                                Weight: 226 lbs 

 

                2012      Blood Pressure 1: 116/60 Code: 8480-6 BMI: 38.1 

Code: 98422-2 Heart 

Rate 1: 92 bpm      Height: 5'4"        Respiratory Rate: 20 bpm Temperature: 36

.8 (C) / 

98.2 (F)                                Weight: 222 lbs 

 

                2011      Blood Pressure 1: 118/62 Code: 8480-6 BMI: 37.9 

Code: 04610-1 Heart 

Rate 1: 80 bpm      Height: 5'4"        Temperature: 36.5 (C) / 97.7 (F) Weight:

 221 lbs 

 

                2011      Blood Pressure 1: 132/70 Code: 8480-6 Heart Rate

 1: 84 bpm 

Respiratory Rate: 20 bpm  Temperature: 36.7 (C) / 98.0 (F) Weight: 221 lbs 

 

                2011      Blood Pressure 1: 114/72 Code: 8480-6 BMI: 37.6 

Code: 32703-8 Heart 

Rate 1: 76 bpm      Height: 5'4"        Respiratory Rate: 20 bpm Temperature: 36

.8 (C) / 

98.3 (F)                                Weight: 219 lbs 

 

                    2011          Blood Pressure 1: 136/76 Code: 8480-6 Te

mperature: 36.0 (C) / 96.8 

(F)                                     Weight: 219 lbs 8 oz

 

                2011      Blood Pressure 1: 128/80 Code: 8480-6 Heart Rate

 1: 72 bpm 

Temperature: 36.3 (C) / 97.4 (F)        Weight: 218 lbs 

 

                2011      Blood Pressure 1: 126/70 Code: 8480-6 Heart Rate

 1: 72 bpm 

Temperature: 36.7 (C) / 98.0 (F)

 

                    2010          Blood Pressure 1: 126/78 Code: 8480-6 Te

mperature: 36.2 (C) / 97.1 

(F)                                     Weight: 217 lbs 8 oz

 

                2010      Blood Pressure 1: 116/70 Code: 8480-6 Heart Rate

 1: 72 bpm 

Temperature: 36.4 (C) / 97.6 (F)        Weight: 222 lbs 

 

                2010      Blood Pressure 1: 114/78 Code: 8480-6 Heart Rate

 1: 72 bpm 

Temperature: 37.1 (C) / 98.8 (F)        Weight: 225 lbs 

 

                2010      Blood Pressure 1: 128/78 Code: 8480-6 Heart Rate

 1: 76 bpm 

Temperature: 36.6 (C) / 97.8 (F)        Weight: 224 lbs 

 

                2010      Blood Pressure 1: 116/78 Code: 8480-6 Heart Rate

 1: 80 bpm 

Temperature: 36.4 (C) / 97.6 (F)        Weight: 226 lbs 

 

                2010      Blood Pressure 1: 120/70 Code: 8480-6 BMI: 38.3 

Code: 07362-4 Heart 

Rate 1: 88 bpm      Height: 5'5"        Temperature: 36.4 (C) / 97.6 (F) Weight:

 230 lbs 







Functional Status

No Functional Status data



Reason For Visit





                    Reason For Visit    Effective Dates     Notes

 

                    follow up           2020           

 

                    blood pressure check 2020           

 

                    high blood pressure 2020           

 

                    lab draw            2019           

 

                    lab draw            10/11/2019           

 

                    hearing loss        2019          left ear

 

                    follow up           06/10/2019           

 

                    follow up           2019          Patient has upcoming

 appointment with Dr Claire and carotid 

dopplers tomorrow

 

                    follow up           2018          Patient had heart ca

th on 10-30-18 and one of the bypass 

veins in spasming

 

                    follow up           2018          4 Week

 

                    follow up           10/02/2018           

 

                    follow up           2018           

 

                    high blood pressure 2018          Dr Claire has ordered

 holter monitor and 

hydralazine 50mg PRN

 

                    dizziness           2018          On  morning darren delvalle woke up and went to the bathroom 

and after lying down became very dizzy. Patient has hx of vertigo so didn't 
think anything of it. She usually just has them intermittently, but had more 
that day. While at [employer] began to feel very ill and became very shaky. She got 
home and sat in the recliner and had the jittery/nervous feeling. Later that 
night it finally resolved. Patient feels like she had a spell like this around 
the  and thought it was due to extreme heat exhaustion.

 

                    follow up           2018           

 

                    back pain           2018           

 

                    otalgia             2018           

 

                    back pain           2018           

 

                    injection(s)        10/06/2017          flu shot

 

                    lab draw            2017           

 

                    follow up           2017           

 

                    pelvic pain         2017          Patient states pain 

started in May with a "Constant Ache"

to left inguinal area. Patient states at that time pain was intermittent. Then, 
approximately 2nd week  of  pain worsened and radiates to left low back 
area.

 

                    lab draw            2017           

 

                    hyperglycemia       2017           

 

                    cough               2017           

 

                    otalgia             2016           

 

                    injection(s)        10/07/2016          Influenza

 

                    lab draw            2016           

 

                    constipation        2016           

 

                    flank pain          2016           

 

                    follow up           2015          3mo fwup

 

                    injection(s)        10/16/2015          Influenza

 

                    acute renal failure 09/15/2015           

 

                    lab draw            09/10/2015           

 

                    fatigue             2015           

 

                    otalgia             2015           

 

                    pain, limb          2015           

 

                    follow up           2015          [facility]

 

                    high blood pressure 2015           

 

                    injection(s)        10/17/2014          flu shot

 

                    sinusitis           2014           

 

                    high blood pressure 2014           

 

                    cough               2014          Was panfilo at Dr Tyree teixeira's office so he started he on amlodipine 

10mg but seems to be dragging her down. Patient decreased to 1/2 tablet this 
last week

 

                    lab draw            2014           

 

                    cough               2014           

 

                    cough               10/15/2013           

 

                    high blood pressure 2013           

 

                    follow up           2013          [facility]

 

                    dizziness           2013           

 

                    follow up           2013           

 

                    otalgia             05/10/2013           

 

                    breast complaint    2013           

 

                    lab draw            2012           

 

                    follow up           2012          2mo fwup

 

                    follow up           10/23/2012          2wk fwup

 

                    gas and bloating    10/10/2012          Dr Claire has her mon

itoring because was high in his 

office at last visit

 

                    injection(s)        2012           

 

                    dizziness           2012           

 

                    dizziness           2012           

 

                    lab draw            2012           

 

                    muscle weakness     2012          concerns of simvasta

tin with fatigue and muscle 

weakness

 

                    leg pain/sciatica   2012           

 

                    hip pain            2012           

 

                    back pain           2012          has tried ice pack, 

muscle relaxers, and moist heat

 

                    back pain           2012           

 

                    fatigue             2012          in hands/feet

 

                    otalgia             2011           

 

                    follow up           2011          2wk fwup

 

                    back pain           2011          thinks ulcer may hav

e returned

 

                    lab draw            2011           

 

                    dizziness           2011          Left ear and facial 

pain, right ear pain 

 

                    follow up           2011          1wk fwup

 

                    hip pain            2011          feels very draggy, f

atanish, BP 80/63, normally running 

100's/60's

 

                    chills              2010           

 

                    injection(s)        10/06/2010          flu shot

 

                    follow up           2010          6wk fwup, had HH rep

aired and is starting to feel better, 

started on reglan 10mg tid

 

                    follow up           2010          hosp fwup

 

                    follow up           2010          1mo fwup, saw Dr Danna du and hasn't gave cardiac clearance 

yet for HH repair, did have chemical stress test yesterday and waiting on 
results

 

                    follow up           2010          from EGD/colonoscopy

--has Hiatal Hernia and wants to do 

repair

 

                    follow up           2010           







Encounters





             Encounter    Performer    Location     Codes        Date

 

                                        () OFFICE/OUTPATIENT VISIT EST

Diagnosis: Essential (primary) hypertension[ICD10: I10]

Diagnosis: Palpitations[ICD10: R00.2] Akanksha EWING                    CPT-4: 50845              2020

 

                                        ([id number]) OFFICE/OUTPATIENT VISIT EST

Diagnosis: Essential hypertension[ICD10: I10]

Diagnosis: Palpitations[ICD10: R00.2]

Diagnosis: Stress reaction[ICD10: F43.0] [facility]            CPT-4: 34187              2020

 

                                        ([id number]) NURSE/OUTPATIENT VISIT EST

Diagnosis: Mixed hyperlipidemia[ICD10: E78.2] Akanksha DRIVER DO [facility]            CPT-4: 80328              2019

 

                                        ([id number]) NURSE/OUTPATIENT VISIT EST

Diagnosis: FLU VACCINE[ICD10: Z23] Akanksha KEY DO 

[facility]                       CPT-4: 63482              10/22/2019

 

                                        ([id number]) NURSE/OUTPATIENT VISIT EST

Diagnosis: Chronic kidney disease, stage 1[ICD10: N18.1] Akanksha DRIVER DO [facility] CPT-4: 10110              10/11/2019

 

                                        ([id number]) OFFICE/OUTPATIENT VISIT EST

Diagnosis: Acute serous otitis media, left ear[ICD10: H65.02] Nat DRIVER DO [facility] CPT-4: 50908              2019

 

                                        ([id number]) OFFICE/OUTPATIENT VISIT EST

Diagnosis: Essential (primary) hypertension[ICD10: I10]

Diagnosis: Coronary atherosclerosis due to calcified coronary lesion[ICD10: 
I25.84]

Diagnosis: Hypoglycemia, unspecified[ICD10: E16.2]

Diagnosis: Other fatigue[ICD10: R53.83]

Diagnosis: Urinary tract infection, site not specified[ICD10: N39.0] Akanksha DRIVER DO [facility] CPT-4: 21447        06/10/2019

 

                                        ([id number]) OFFICE/OUTPATIENT VISIT EST

Diagnosis: Essential (primary) hypertension[ICD10: I10]

Diagnosis: Gastro-esophageal reflux disease without esophagitis[ICD10: K21.9]

Diagnosis: Urinary tract infection, site not specified[ICD10: N39.0] Akanksha DRIVER DO [facility] CPT-4: 78484        2019

 

                                        ([id number]) OFFICE/OUTPATIENT VISIT EST

Diagnosis: Gastro-esophageal reflux disease without esophagitis[ICD10: K21.9]

Diagnosis: Epigastric pain[ICD10: R10.13] [facility]            CPT-4: 67151              2018

 

                                        (79204) OFFICE/OUTPATIENT VISIT EST

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

Diagnosis: Essential (primary) hypertension[ICD10: I10]

Diagnosis: Generalized anxiety disorder[ICD10: F41.1]

Diagnosis: Gastro-esophageal reflux disease without esophagitis[ICD10: K21.9] 

Akanksha DRIVER [facility] CPT-4: 36327        2018

 

                                        OFFICE/OUTPATIENT VISIT EST

Diagnosis: Gastro-esophageal reflux disease without esophagitis[ICD10: K21.9]

Diagnosis: Palpitations[ICD10: R00.2]

Diagnosis: Urinary tract infection, site not specified[ICD10: N39.0]

Diagnosis: Generalized anxiety disorder[ICD10: F41.1] Akanksha DRIVER [facility] CPT-4: 17012              10/02/2018

 

                                        (13377) NURSE/OUTPATIENT VISIT EST

Diagnosis: Coronary atherosclerosis due to calcified coronary lesion[ICD10: 
I25.84]

Diagnosis: Hypoglycemia, unspecified[ICD10: E16.2]

Diagnosis: Dizziness and giddiness[ICD10: R42]

Diagnosis: Occlusion and stenosis of bilateral carotid arteries[ICD10: I65.23] 

Akanksha DRIVER [facility] CPT-4: 45881        2018

 

                                        OFFICE/OUTPATIENT VISIT EST

Diagnosis: Epigastric pain[ICD10: R10.13]

Diagnosis: Generalized anxiety disorder[ICD10: F41.1]

Diagnosis: FLU VACCINE[ICD10: Z23] Akanksha SPENCER

[facility]                       CPT-4: 90568              2018

 

                                        (34606) OFFICE/OUTPATIENT VISIT EST

Diagnosis: Essential (primary) hypertension[ICD10: I10]

Diagnosis: Hypoglycemia, unspecified[ICD10: E16.2]

Diagnosis: Gastro-esophageal reflux disease without esophagitis[ICD10: K21.9] 

Akanksha DRIVER [facility] CPT-4: 73084        2018

 

                                        ([id number]) OFFICE/OUTPATIENT VISIT EST

Diagnosis: Dizziness and giddiness[ICD10: R42] Nat DRIVER [address] [facility]            CPT-4: 72815              2018

 

                                        (70038) OFFICE/OUTPATIENT VISIT EST

Diagnosis: Cervicalgia[ICD10: M54.2]

Diagnosis: Other spondylosis with radiculopathy, cervical region[ICD10: M47.22] 

Akanksha DRIVER [facility] CPT-4: 25985        2018

 

                                        (25562) OFFICE/OUTPATIENT VISIT EST

Diagnosis: Hypoglycemia, unspecified[ICD10: E16.2]

Diagnosis: Other spondylosis with radiculopathy, cervical region[ICD10: M47.22]

Diagnosis: Vertigo of central origin, bilateral[ICD10: H81.43]

Diagnosis: Cervicocranial syndrome[ICD10: M53.0] Akanksha Villa DRIVER [address] [facility]         CPT-4: 95548              2018

 

                                        (52391) OFFICE/OUTPATIENT VISIT EST

Diagnosis: Benign paroxysmal vertigo, bilateral[ICD10: H81.13]

Diagnosis: Otalgia, bilateral[ICD10: H92.03] Nat DRIVER [address] [facility]            CPT-4: 30162              2018

 

                                        (90859) OFFICE/OUTPATIENT VISIT EST

Diagnosis: Low back pain[ICD10: M54.5]

Diagnosis: Radiculopathy, lumbosacral region[ICD10: M54.17]

Diagnosis: Left lower quadrant pain[ICD10: R10.32] Akanksha DE LA ROSA

OLIVIA SPENCER [address] [facility]         CPT-4: 41583              2018

 

                                        (35498) OFFICE/OUTPATIENT VISIT EST

Diagnosis: FLU VACCINE[ICD10: Z23] Akanksha Xiangrodo KEVINQUELINE OLIVIA KEY [address] 

[facility]                       CPT-4: 40257              10/06/2017

 

                                        (34638) OFFICE/OUTPATIENT VISIT EST

Diagnosis: Mixed hyperlipidemia[ICD10: E78.2]

Diagnosis: Essential (primary) hypertension[ICD10: I10]

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

Diagnosis: Impaired fasting glucose[ICD10: R73.01]

Diagnosis: Other fatigue[ICD10: R53.83] Akanksha DRIVER DO [facility]                    CPT-4: 25919              2017

 

                                        (49843) OFFICE/OUTPATIENT VISIT EST

Diagnosis: Pain in thoracic spine[ICD10: M54.6]

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 

Akanksha DRIVER DO [facility] CPT-4: 63811        2017

 

                                        (54648) OFFICE/OUTPATIENT VISIT EST

Diagnosis: Left lower quadrant pain[ICD10: R10.32]

Diagnosis: Low back pain[ICD10: M54.5] Akanksha HIGHTOWERLINE OLIVIA SYKES DO [facility]                    CPT-4: 33718              2017

 

                                        (05345) OFFICE/OUTPATIENT VISIT EST

Diagnosis: Mixed hyperlipidemia[ICD10: E78.2]

Diagnosis: Essential (primary) hypertension[ICD10: I10]

Diagnosis: Hypoglycemia, unspecified[ICD10: E16.2] Akanksha DRIVER [facility]         CPT-4: 57676              2017

 

                                        (97822) OFFICE/OUTPATIENT VISIT EST

Diagnosis: Impaired fasting glucose[ICD10: R73.01]

Diagnosis: Mastodynia[ICD10: N64.4]

Diagnosis: Mixed hyperlipidemia[ICD10: E78.2]

Diagnosis: Essential (primary) hypertension[ICD10: I10]

Diagnosis: Other fatigue[ICD10: R53.83] Akanksha Xiangndangela HIGHTOWERLINE SAramis 

VILLA

[facility]                    CPT-4: 54464              2017

 

                                        (49729) OFFICE/OUTPATIENT VISIT EST

Diagnosis: Acute upper respiratory infection, unspecified[ICD10: J06.9] Luba HIGHTOWERLINE OLIVIA DRIVER DO [facility] CPT-4: 59460        2017

 

                                        (04222) OFFICE/OUTPATIENT VISIT EST

Diagnosis: Acute mastoiditis without complications, right ear[ICD10: H70.001] 

Akankshatammy KEVINQUELINE SAramis VILLA CASE [facility] CPT-4: 19004        2016

 

                                        (92464) OFFICE/OUTPATIENT VISIT EST

Diagnosis: FLU VACCINE[ICD10: Z23] Akanksha BAUMAN SAramis TJ KEY [facility]                       CPT-4: 86492              10/07/2016

 

                                        (52398) OFFICE/OUTPATIENT VISIT EST

Diagnosis: Mixed hyperlipidemia[ICD10: E78.2]

Diagnosis: Essential (primary) hypertension[ICD10: I10]

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

Diagnosis: Impaired fasting glucose[ICD10: R73.01] Akanksha DRIVER [facility]         CPT-4: 97310              2016

 

                                        (72121) OFFICE/OUTPATIENT VISIT EST

Diagnosis: Constipation, unspecified[ICD10: K59.00] Akanksha DRIVER [facility] CPT-4: 02708              2016

 

                                        (55851) OFFICE/OUTPATIENT VISIT EST

Diagnosis: Essential (primary) hypertension[ICD10: I10]

Diagnosis: Mixed hyperlipidemia[ICD10: E78.2]

Diagnosis: Chronic kidney disease, stage 1[ICD10: N18.1] Akanksha DRIVER [facility] CPT-4: 33802              2016

 

                                        (56602) OFFICE/OUTPATIENT VISIT EST

Diagnosis: Muscle weakness (generalized)[ICD10: M62.81]

Diagnosis: Dizziness and giddiness[ICD10: R42]

Diagnosis: Essential (primary) hypertension[ICD10: I10]

Diagnosis: History of falling[ICD10: Z91.81] Akanksha Otoolendangela DRIVER [facility]            CPT-4: 37133              2015

 

                                        (75786) OFFICE/OUTPATIENT VISIT EST

Diagnosis: FLU VACCINE[ICD10: Z23] Akanksha KEY [facility]                       CPT-4: 47665              10/16/2015

 

                                        (39726) OFFICE/OUTPATIENT VISIT EST

Diagnosis: Acute renal failure[ICD9: 584.9]

Diagnosis: POLYURIA[ICD9: 788.42] Akanksha LANCE [facility]                       CPT-4: 89535              09/15/2015

 

                                        (82111) OFFICE/OUTPATIENT VISIT EST

Diagnosis: HYPERLIPIDEMIA NEC/NOS[ICD9: 272.4]

Diagnosis: HYPERTENSION[ICD9: 401.9]

Diagnosis: CAD[ICD9: 414.00]

Diagnosis: Hyperglycemia[ICD9: 790.29]

Diagnosis: MALAISE AND FATIGUE[ICD9: 780.79] Akanksha DRIVER DO [facility]            CPT-4: 54356              09/10/2015

 

                                        (92551) OFFICE/OUTPATIENT VISIT EST

Diagnosis: MALAISE AND FATIGUE[ICD9: 780.79]

Diagnosis: HYPOGLYCEMIA[ICD9: 251.2]

Diagnosis: Grieving[ICD9: 309.0]

Diagnosis: ABDOMINAL PAIN[ICD9: 789.00] Akanksha DRIVER DO [facility]                    CPT-4: 23020              2015

 

                                        OFFICE/OUTPATIENT VISIT EST

Diagnosis: CERUMEN IMPACTION[ICD9: 380.4]

Diagnosis: EUSTACHIAN TUBE DYSFUNCTION[ICD9: 381.81] Bertha FuentesLilayuri DRIVER DO [facility] CPT-4: 54621              2015

 

                                        (69279) OFFICE/OUTPATIENT VISIT EST

Diagnosis: Leg pain[ICD9: 729.5]

Diagnosis: SUPERFIC PHLEBITIS-LEG[ICD9: 451.0] Akanksha DRIVER [address] [facility]            CPT-4: 49649              2015

 

                                        (54597) OFFICE/OUTPATIENT VISIT EST

Diagnosis: Thoracic back pain[ICD9: 724.1]

Diagnosis: SPASM OF MUSCLE[ICD9: 728.85] Akanksha DRIVER DO [facility]            CPT-4: 44021              2015

 

                                        (85955) OFFICE/OUTPATIENT VISIT EST

Diagnosis: DIZZINESS/VERTIGO[ICD9: 780.4]

Diagnosis: Benign positional vertigo[ICD9: 386.11]

Diagnosis: HYPERTENSION[ICD9: 401.9]

Diagnosis: Suspicious nevus[ICD9: 238.2] Akanksha DRIVER DO [facility]            CPT-4: 91306              2015

 

                                        (07475) OFFICE/OUTPATIENT VISIT EST

Diagnosis: FLU VACCINE[ICD10: Z23] Akanksha KEY DO 

[facility]                       CPT-4: 45090              10/17/2014

 

                                        OFFICE/OUTPATIENT VISIT EST

Diagnosis: SINUSITIS, ACUTE[ICD9: 461.9]

Diagnosis: EUSTACHIAN TUBE DYSFUNCTION[ICD9: 381.81] Bertha DRIVER [facility] CPT-4: 44379              2014

 

                                        (23891) OFFICE/OUTPATIENT VISIT EST

Diagnosis: DIZZINESS/VERTIGO[ICD9: 780.4]

Diagnosis: HYPERTENSION[ICD9: 401.9] Akanksha OTOOLE

[facility]                       CPT-4: 91504              2014

 

                                        (50546) OFFICE/OUTPATIENT VISIT EST

Diagnosis: HYPERTENSION[ICD9: 401.9]

Diagnosis: MALAISE AND FATIGUE[ICD9: 780.79]

Diagnosis: SINUSITIS, ACUTE[ICD9: 461.9] [facility]            CPT[facility]: 09135              2014

 

                                        (42645) OFFICE/OUTPATIENT VISIT EST

Diagnosis: HYPERLIPIDEMIA NEC/NOS[ICD9: 272.4]

Diagnosis: HYPERTENSION[ICD9: 401.9]

Diagnosis: B12 DEFIC ANEMIA NEC[ICD9: 281.1]

Diagnosis: HYPOGLYCEMIA[ICD9: 251.2] Akanksha OTOOLE

[facility]                       CPT-4: 08734              2014

 

                                        OFFICE/OUTPATIENT VISIT EST

Diagnosis: COUGH[ICD9: 786.2] Bertha DRIVER [facility] 

CPT-4: 85658                            2014

 

                                        OFFICE/OUTPATIENT VISIT EST

Diagnosis: COUGH[ICD9: 786.2]

Diagnosis: URI, ACUTE[ICD9: 465.9] Bertha SPENCER

[facility]                       CPT-4: 22151              10/15/2013

 

                                        (47884) OFFICE/OUTPATIENT VISIT EST

Diagnosis: HYPERTENSION[ICD9: 401.9]

Diagnosis: CEPHALGIA[ICD9: 784.0]

Diagnosis: ANXIETY STATE NOS[ICD9: 300.00]

Diagnosis: FLU VACCINE[ICD9: V04.81] [facility]                       CPT-4: 20438              2013

 

                                        (48555) OFFICE/OUTPATIENT VISIT EST

Diagnosis: DIZZINESS/VERTIGO[ICD9: 780.4]

Diagnosis: SINUSITIS, ACUTE[ICD9: 461.9]

Diagnosis: Benign positional vertigo[ICD9: 386.11] Akanksha Xiangrodo KEVIN

FELECAITAMMY

SAramis VILLA [facility]         CPT-4: 63467              2013

 

                                        OFFICE/OUTPATIENT VISIT EST

Diagnosis: OTITIS MEDIA NOS[ICD9: 382.9] Akanksha Otoolendangela HIGHTOWERLINE SAramis

 

VILLA [facility]            CPT-4: 80716              2013

 

                                        OFFICE/OUTPATIENT VISIT EST

Diagnosis: Perforation of ear drum[ICD9: 384.20]

Diagnosis: SINUSITIS, ACUTE[ICD9: 461.9] Akanksha Otoolerodo        AKANKSHA SAramis

 

VILLA [facility]            CPT-4: 15843              05/10/2013

 

                                        (59003) OFFICE/OUTPATIENT VISIT EST

Diagnosis: Mastalgia[ICD9: 611.71] Akanksha Xiangrodo        AKANKSHA SAramis TJ KEY [facility]                       CPT-4: 84135              2013

 

                                        (52337) OFFICE/OUTPATIENT VISIT EST

Diagnosis: HYPERLIPIDEMIA NEC/NOS[ICD9: 272.4]

Diagnosis: HYPERTENSION[ICD9: 401.9]

Diagnosis: DIZZINESS/VERTIGO[ICD9: 780.4]

Diagnosis: Hyperglycemia[ICD9: 790.29]

Diagnosis: MALAISE AND FATIGUE[ICD9: 780.79] Akankshazina TEIXEIRA SAramis 

VILLA [facility]            CPT-4: 99784              2012

 

                                        (90432) OFFICE/OUTPATIENT VISIT EST

Diagnosis: EUSTACHIAN TUBE DYSFUNCTION[ICD9: 381.81]

Diagnosis: DIZZINESS/VERTIGO[ICD9: 780.4]

Diagnosis: ABDOMINAL PAIN[ICD9: 789.00]

Diagnosis: GERD[ICD9: 530.81]

Diagnosis: CERUMEN IMPACTION[ICD9: 380.4] Akanksha Xiangrodo BAUMAN S

Aramis 

[facility]            CPT-4: 85224              2012

 

                                        OFFICE/OUTPATIENT VISIT EST

Diagnosis: ABDOMINAL PAIN[ICD9: 789.00]

Diagnosis: DYSPEPSIA[ICD9: 536.8] Akanksha BAKER XIANGLORNA LANCE [facility]                       CPT-4: 03172              10/23/2012

 

                                        (73363) OFFICE/OUTPATIENT VISIT EST

Diagnosis: ABDOMINAL PAIN[ICD9: 789.00]

Diagnosis: DYSPEPSIA[ICD9: 536.8]

Diagnosis: IBS[ICD9: 564.1] Akanksha DRIVER [facility] CPT

-

4: 08238                                10/10/2012

 

                                        OFFICE/OUTPATIENT VISIT EST

Diagnosis: DIZZINESS/VERTIGO[ICD9: 780.4]

Diagnosis: HYPOGLYCEMIA[ICD9: 251.2] Akanksha MEJIA [facility]                       CPT-4: 50634              2012

 

                                        (98150) OFFICE/OUTPATIENT VISIT EST

Diagnosis: URINARY TRACT INFECTION[ICD9: 599.0] Akanksha DRIVER [facility]            CPT-4: 46969              2012

 

                                        (80452) OFFICE/OUTPATIENT VISIT EST

Diagnosis: HYPERLIPIDEMIA NEC/NOS[ICD9: 272.4]

Diagnosis: HYPERTENSION[ICD9: 401.9]

Diagnosis: MALAISE AND FATIGUE[ICD9: 780.79]

Diagnosis: DIZZINESS/VERTIGO[ICD9: 780.4] [facility]ha [facility]            CPT-4: 22874              2012

 

                                        (12054) OFFICE/OUTPATIENT VISIT EST

Diagnosis: DIZZINESS/VERTIGO[ICD9: 780.4]

Diagnosis: MALAISE AND FATIGUE[ICD9: 780.79]

Diagnosis: HYPERTENSION[ICD9: 401.9] Akanksha MEJIA [facility]                       CPT-4: 75075              2012

 

                                        OFFICE/OUTPATIENT VISIT EST

Diagnosis: LUMB/LUMBOSAC DISC DEGEN[ICD9: 722.52]

Diagnosis: Radiculopathy of leg[ICD9: 724.4]

Diagnosis: SACROILIITIS NEC[ICD9: 720.2]

Diagnosis: SPINAL ENTHESOPATHY[ICD9: 720.1] Akanksha DRIVER [facility]            CPT-4: 14387              2012

 

                                        (63148) OFFICE/OUTPATIENT VISIT EST

Diagnosis: PAIN, LOWER BACK[ICD9: 724.2]

Diagnosis: SPASM OF MUSCLE[ICD9: 728.85]

Diagnosis: SCIATICA[ICD9: 724.3]

Diagnosis: Lumbar degenerative disc disease[ICD9: 722.52] Akanksha DRIVER DO [facility] CPT-4: 26006              2012

 

                                        (13699) OFFICE/OUTPATIENT VISIT EST

Diagnosis: PAIN IN THORACIC SPINE[ICD9: 724.1]

Diagnosis: SPASM OF MUSCLE[ICD9: 728.85] Akanksha DRIVER DO [facility]            CPT-4: 39021              2012

 

                                        (30482) OFFICE/OUTPATIENT VISIT EST

Diagnosis: PAIN, LOWER BACK[ICD9: 724.2]

Diagnosis: SPASM OF MUSCLE[ICD9: 728.85]

Diagnosis: Sacroiliac dysfunction[ICD9: 739.4]

Diagnosis: Lumbar degenerative disc disease[ICD9: 722.52] Akanksha DRIVER DO [facility] CPT-4: 12530              2012

 

                                        OFFICE/OUTPATIENT VISIT EST

Diagnosis: MALAISE AND FATIGUE[ICD9: 780.79]

Diagnosis: HYPOTENSION[ICD9: 458.9]

Diagnosis: CAD[ICD9: 414.00] Akanksha DRIVER DO [facility] 

CPT-4: 64673                            2012

 

                                        OFFICE/OUTPATIENT VISIT EST

Diagnosis: SINUSITIS, ACUTE[ICD9: 461.9]

Diagnosis: PHARYNGITIS, ACUTE[ICD9: 462]

Diagnosis: CERUMEN IMPACTION[ICD9: 380.4] [facility]ha [facility]            CPT-4: 57509              2011

 

                                        OFFICE/OUTPATIENT VISIT EST

Diagnosis: PAIN IN THORACIC SPINE[ICD9: 724.1]

Diagnosis: GERD[ICD9: 530.81]

Diagnosis: DIZZINESS/VERTIGO[ICD9: 780.4] [facility]anksha [facility]            CPT-4: 91026              2011

 

                OFFICE/OUTPATIENT VISIT EST Akanksha MEJIA DO [facility] CPT-

4: 54883                                2011

 

                    (93615) OFFICE/OUTPATIENT VISIT EST Akanksha DRIVER DO 

[facility]                       CPT-4: 04964              2011

 

                                        (04590) OFFICE/OUTPATIENT VISIT, EST

Diagnosis: PAIN, LOWER BACK[ICD9: 724.2] Akanksha BAUMAN SAramis

 

[facility]            CPT-4: 69207              2011

 

                    (37616) OFFICE/OUTPATIENT VISIT, EST Akanksha DE LA ROSA S. [facility]

[facility]                       CPT-4: 36564              2011

 

                    (43963) OFFICE/OUTPATIENT VISIT, EST Akanksha DE LA ROSA S. [facility] DO

[facility]                       CPT-4: 58108              2010

 

                    (55751) OFFICE/OUTPATIENT VISIT, EST Akanksha IZQUIERDOLINE S. [facility] DO

[facility]                       CPT-4: 47433              2010

 

                    (39245) OFFICE/OUTPATIENT VISIT, EST Akanksha DE LA ROSA S. [facility] DO

[facility]                       CPT-4: 79820              2010

 

                    (39333) OFFICE/OUTPATIENT VISIT, EST Akanksha DE LA ROSA S. ORENDER DO

[facility]                       CPT-4: 53547              2010

 

                    (14439) OFFICE/OUTPATIENT VISIT, EST Akanksha DE LA ROSA S. [facility]END[facility] DO

[facility]                       CPT-4: 40812              2010

 

                    (38397) OFFICE/OUTPATIENT VISIT, EST Akanksha DE LA ROSA S. ORENDER DO

[facility]                       CPT-4: 19747              2010







Plan of Care





             Planned Activity Notes        Codes        Status       Date

 

                          Visit Diagnosis Plan: Essential (primary) hypertension

 Discussion: Improving 

with higher dose of metoprolol Recheck 2weeks

                                        ICD-9 : 401.9

ICD-10 : I10

                                                    2020

 

                          Visit Diagnosis Plan: Palpitations Discussion: Continu

e higher dose of 

metoprolol

                                        ICD-9 : 785.1

ICD-10 : R00.2

                                                    2020

 

                                        Appointment: Akanksha Driver

WPtel:[phone] [address] [id number]

              2020--moved up to 20 at 4pm (km)                 [place]

NCELED        2020

 

                          Visit Diagnosis Plan: Essential hypertension Discussio

n: Increase metoprolol to 

25mg q AM and 50mg po q pM Recheck 1 week

                                        ICD-9 : 401.9

ICD-10 : I10

                                                    2020

 

                          Visit Diagnosis Plan: Palpitations Discussion: Check C

BC, CMP, TSH

                                        ICD-9 : 785.1

ICD-10 : R00.2

                                                    2020

 

                                        Appointment: Akanksha Driver

WPtel:[phone]

                                        [address] [place] [address]                                              BP CHECK        2020

 

                                        Appointment: Akanksha Driver

WPtel:[phone]

                                        [address] [place] [address]                                              ACUTE ILLNESS   2020

 

                          Patient Education: metoprolol tartrate- [employer] [facility] [id number] 

[email or web address]/resources/[email or web address]

                                        Completed           2020

 

                    Care Plan: X-RAY EXAM NECK SPINE 4/5VWS                     

LOINC : 85014-1

                          Pending                   2020

 

                    Care Plan: X-RAY EXAM THORAC SPINE4/>VW                     

LOINC : 11932-4

                          Pending                   2020

 

                                        Appointment: Akanksha Driver

WPtel:[phone]

                                        [address] [place] [address]                                              LAB             2019

 

                                        Appointment: Akanksha Driver

WPtel:[phone]

                                        [address] [place], [place] [id number]

US                                              INJECTION       10/22/2019

 

             Patient Education: INFLUENZA VACCINE [facility]                           

Completed    10/22/2019

 

                                        Appointment: Akanksha Driver

WPtel:[phone]

                                        [address] [place], [place] [id number]

[place]                                              LAB             10/11/2019

 

                          Visit Diagnosis Plan: Acute serous otitis media, left 

ear Discussion: instructed

to use flonase and claritin daily for symptom relief. discussed with patient 
that if no improvement in 2 weeks, will need to follow up with ENT for audiology
exam. instructed to call office with any other symptoms such as otalgia, 
dysphagia, fever, etc.

                                        ICD-9 : 381.01

ICD-10 : H65.02

                                                    2019

 

                                        Appointment: Nat Lozoya

                                        [address] [place], [place] [id number]

                                              ACUTE ILLNESS   2019

 

                          Visit Diagnosis Plan: Hypoglycemia, unspecified Discus

madeleine: Monitor BS At least 

6 small meals a day each with protein

                                        ICD-9 : 251.2

ICD-10 : E16.2

                                                    06/10/2019

 

                          Visit Diagnosis Plan: Essential (primary) hypertension

 Discussion: Stable Check 

CMP

                                        ICD-9 : 401.9

ICD-10 : I10

                                                    06/10/2019

 

                          Visit Diagnosis Plan: Other fatigue Discussion: Check 

CBC, TSH

                                        ICD-9 : 780.79

ICD-10 : R53.83

                                                    06/10/2019

 

                          Visit Diagnosis Plan: Urinary tract infection, site no

t specified Discussion: 

Started an old abx prescription

                                        ICD-9 : 599.0

ICD-10 : N39.0

                                                    06/10/2019

 

                                        Appointment: Akanksha Driver

WPtel:[phone]

                                        [address] [place], [place] [id number]

[place]                                              FOLLOW UP       06/10/2019

 

                          Visit Diagnosis Plan: Urinary tract infection, site no

t specified Discussion: 

Macrobid 100mg po BID for 1 week

                                        ICD-9 : 599.0

ICD-10 : N39.0

                                                    2019

 

                          Visit Diagnosis Plan: Gastro-esophageal reflux disease

 without esophagitis 

Discussion: Stable on omeprazole

Follow Up: 3 months

                                        ICD-9 : 530.81

ICD-10 : K21.9

                                                    2019

 

                          Visit Diagnosis Plan: Essential (primary) hypertension

 Discussion: Stable Sees 

Dr. Clements this month

                                        ICD-9 : 401.9

ICD-10 : I10

                                                    2019

 

                                        Appointment: Akanksha Driver

WPtel:[phone]

                                        [address] [place], [place] [address]

[place]                                              FOLLOW UP       2019

 

                          Patient Education: metoprolol tartrate- [employer] [facility] [id number] 

[email or web address]://[email or web address]/samplemd/resources/getResource/[email or web address]/[email or web address]-[email or web address]

                                        Completed           2019

 

                                        Appointment: Akanksha Driver

WPtel:[phone]

                                        [address] [place], [place] [address]

[place]                                              CANCELED        02/15/2019

 

                          Visit Diagnosis Plan: Gastro-esophageal reflux disease

 without esophagitis 

Discussion: Continue protonix and carafate Proceed with updated EGD

                                        ICD-9 : 530.81

ICD-10 : K21.9

                                                    2018

 

                          Visit Diagnosis Plan: Epigastric pain Discussion: [facility] CT abdomen/pelvis due to

ongoing abdominal pain

                                        ICD-9 : 789.06

ICD-10 : R10.13

                                                    2018

 

                                        Appointment: Akanksha Driver

WPtel:[phone]

                                        [facility]

[place]                                              FOLLOW UP       2018

 

                    Care Plan: CT PELVIS W/O DYE                     LOINC : 361

08-9

                          Pending                   2018

 

                    Care Plan: CT ABDOMEN W/O DYE                     LOINC : 36

103-0

                          Pending                   2018

 

                    Care Plan: Referral Order                     SNOMED-CT : 30

[id number]

                          Pending                   2018

 

                                        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

art disease of native coronary artery 

without angina pectoris                 Discussion: S/P cardiac cath--doing medi

vicki management 

with imdur and metoprolol increased Has fwup with cardiology next week Discussed
cardiac rehab

                                        ICD-9 : 414.00

ICD-10 : I25.10

                                                    2018

 

                          Visit Diagnosis Plan: Gastro-esophageal reflux disease

 without esophagitis 

Discussion: Continue protonix at BID dosing and carafate BID

Follow Up: 2 months

                                        ICD-9 : 530.81

ICD-10 : K21.9

                                                    2018

 

                          Visit Diagnosis Plan: Essential (primary) hypertension

 Discussion: Stable

                                        ICD-9 : 401.9

ICD-10 : I10

                                                    2018

 

                          Visit Diagnosis Plan: Generalized anxiety disorder Dis

cussion: Stable

                                        ICD-9 : 300.00

ICD-10 : F41.1

                                                    2018

 

                                        Appointment: Akanksha Driver

WPtel:[phone]

                                        [facility]

[place]                                              FOLLOW UP       2018

 

                          Visit Diagnosis Plan: Gastro-esophageal reflux disease

 without esophagitis 

Discussion: Continue protonix at BID dosing Continue carafate at q AC and HS 
dosing for 2 more weeks then decrease to BID dosing

Follow Up: 4 weeks

                                        ICD-9 : 530.81

ICD-10 : K21.9

                                                    10/02/2018

 

                          Visit Diagnosis Plan: Generalized anxiety disorder Dis

cussion: Increase xanax to

BID dosing especially with upcoming cardiac testing which is already making 
patient more anxious and worried

                                        ICD-9 : 300.00

ICD-10 : F41.1

                                                    10/02/2018

 

                          Visit Diagnosis Plan: Palpitations Discussion: Bobby

carltonchristian going to schedule 

Lexiscan

                                        ICD-9 : 785.1

ICD-10 : R00.2

                                                    10/02/2018

 

                          Visit Diagnosis Plan: Urinary tract infection, site no

t specified Discussion: 

Reculture urine

                                        ICD-9 : 599.0

ICD-10 : N39.0

                                                    10/02/2018

 

                                        Appointment: Akanksha Driver

WPtel:[phone]

                                        [address] [place] [address]                                              FOLLOW UP       10/02/2018

 

             Patient Education: Patient Medication Summary                      

     Completed    10/02/2018

 

                                        Appointment: Akanksha Driver

WPtel:[phone]

                                        [address] [place] [address]                                              LAB             2018

 

             Patient Education: Patient Medication Summary                      

     Completed    2018

 

                          Visit Diagnosis Plan: Generalized anxiety disorder Dis

cussion: Did discuss 

increased risk of benzodiazepines causing dementia but at this time will stay at
xanax 0.25mg po BID

                                        ICD-9 : 300.00

ICD-10 : F41.1

                                                    2018

 

                          Visit Diagnosis Plan: Epigastric pain Discussion: Cont

inue protonix and carafate

but make into a slurry Flu shot given Discussed EGD if symptoms persist

Follow Up: 2 weeks

                                        ICD-9 : 789.06

ICD-10 : R10.13

                                                    2018

 

                                        Appointment: Akanksha Driver

WPtel:[phone]

                                        [address] [place] [address]                                              FOLLOW UP       2018

 

             Patient Education: Patient Medication Summary                      

     Completed    2018

 

                          Visit Diagnosis Plan: Essential (primary) hypertension

 Discussion: Has 

hydralazine to use prn per cardiology Going to do 30 day holter monitor

                                        ICD-9 : 401.9

ICD-10 : I10

                                                    2018

 

                          Visit Diagnosis Plan: Hypoglycemia, unspecified Discus

madeleine: 6 small meals a 

day--each with protein Increase fluid intake

                                        ICD-9 : 251.2

ICD-10 : E16.2

                                                    2018

 

                          Visit Diagnosis Plan: Gastro-esophageal reflux disease

 without esophagitis 

Discussion: Change to protonix 40mg po BID

Follow Up: 1 months

                                        ICD-9 : 530.81

ICD-10 : K21.9

                                                    2018

 

                                        Appointment: Akanksha Driver

WPtel:[phone] [address]                                              ACUTE ILLNESS   2018

 

             Patient Education: Patient Medication Summary                      

     Completed    2018

 

                          Visit Diagnosis Plan: Dizziness and giddiness Discussi

on: blood work to be 

completed in office including cmp, cbc, troponin to rule out glucose 
intolerance, infection, dehydration. instructed patient that she needs to see 
her cardiologist sooner than oct and patient requested we contact dr. clements's 
office. will have front office make appt. patient has carotid doppler annually.

                                        ICD-9 : 780.4

ICD-10 : R42

                                                    2018

 

                                        Appointment: Nat Lozoya

                                        [address] [place] [address]                                              ACUTE ILLNESS   2018

 

             Patient Education: Patient Medication Summary                      

     Completed    2018

 

                          Visit Diagnosis Plan: Cervicalgia Discussion: Complete

 course of PT since 

symptoms improved Continue stretching exercises Notify if symptoms return or 
worsen

                                        ICD-9 : 723.1

ICD-10 : M54.2

                                                    2018

 

                                        Appointment: Akanksha Driver

WPtel:[phone]

                                        [facility] [address]                                              FOLLOW UP       2018

 

             Patient Education: Patient Medication Summary                      

     Completed    2018

 

                          Visit Diagnosis Plan: Vertigo of central origin, bilat

eral Discussion: Discussed

PT for vestibular therapy

                                        ICD-9 : 386.2

ICD-10 : H81.43

                                                    2018

 

                          Visit Diagnosis Plan: Other spondylosis with radiculop

athy, cervical region 

Discussion: Check MRI of cervical spine

                                        ICD-9 : 721.0

ICD-10 : M47.22

                                                    2018

 

                          Visit Diagnosis Plan: Hypoglycemia, unspecified Discus

madeleine: Eat something with 

protein every 2 hrs

                                        ICD-9 : 251.2

ICD-10 : E16.2

                                                    2018

 

                                        Appointment: Akanksha Driver

WPtel:[phone] [address] [facility]

                                                              2018

 

             Patient Education: Patient Medication Summary                      

     Completed    2018

 

                    Care Plan: MRI NECK SPINE W/O DYE                     [facility] 

: [id number]

                          Pending                   2018

 

                          Visit Diagnosis Plan: Benign paroxysmal vertigo, bilat

eral Discussion: patient 

has meclizine at home but states it makes her too tired. instructed patient to 
cut in half and take at night. if it doesn't cause too much drowsiness, 
instructed to take bid until feeling better. instructed to rtc wed if no 
improvement or worsening symptoms. instructed patient to increase fluid intake 
as well.

                                        ICD-9 : 386.11

ICD-10 : H81.13

                                                    2018

 

                          Visit Diagnosis Plan: Otalgia, bilateral Discussion: k

enalog 40 mg given to 

patient im. instructed patient to monitor blood sugar at home due to risk of 
increased sugar. instructed patient to rtc on wednesday if no improvement and 
may require antibiotic.

                                        ICD-9 : 388.70

ICD-10 : H92.03

                                                    2018

 

                                        Appointment: Nat Lozoya

                                        [address] [place] [address]                                              ACUTE ILLNESS   2018

 

             Patient Education: Patient Medication Summary                      

     Completed    2018

 

                          Visit Diagnosis Plan: Low back pain Discussion: Stretc

hes Topical aspercreme 

Tylenol 1 po TID

                                        ICD-9 : 724.2

ICD-10 : M54.5

                                                    2018

 

                          Visit Diagnosis Plan: Radiculopathy, lumbosacral regio

n Discussion: As above

                                        ICD-9 : 724.4

ICD-10 : M54.17

                                                    2018

 

                          Visit Diagnosis Plan: Left lower quadrant pain Discuss

ion: Culture urine Notify 

if worsens

                                        ICD-9 : 789.04

ICD-10 : R10.32

                                                    2018

 

                                        Appointment: Akanksha Driver

WPtel:[phone]

                                        [address] [place] [address]                                              ACUTE ILLNESS   2018

 

             Patient Education: Patient Medication Summary                      

     Completed    2018

 

                                        Appointment: Akanksha Driver

WPtel:[phone]

                                        [address] [place] [address]

[place]                                              INJECTION       10/06/2017

 

             Patient Education: Patient Medication Summary                      

     Completed    10/06/2017

 

                                        Appointment: Akanksha Driver

WPtel:[phone]

                                        [address] [place], [place] [address]

[place]                                              LAB             2017

 

             Patient Education: Patient Medication Summary                      

     Completed    2017

 

                          Visit Diagnosis Plan: Other intervertebral disc degene

ration, lumbar region 

Follow Up: 3 months

                                        ICD-9 : 722.52

ICD-10 : M51.36

                                                    2017

 

                          Visit Diagnosis Plan: Pain in thoracic spine Discussio

n: PT has helped Continue 

stretches and walking [facility]ed [facility]

                                        ICD-9 : 724.1

ICD-10 : M54.6

                                                    2017

 

                                        Appointment: Akanksha Driver

WPtel:[phone]

                                        [address] [place], [place] [id number]

                                              FOLLOW UP       2017

 

             Patient Education: Patient Medication Summary                      

     Completed    2017

 

                          Visit Diagnosis Plan: Left lower quadrant pain Discuss

ion: Had CT scan of 

abdomen/pelvis in 2017 and normal colonoscopy in 2015

                                        ICD-9 : 789.04

ICD-10 : R10.32

                                                    2017

 

                          Visit Diagnosis Plan: Low back pain Discussion: Start 

PT for low back- Seems 

like abdominal pain is radiating from low back

Follow Up: 5 weeks

                                        ICD-9 : 724.2

ICD-10 : M54.5

                                                    2017

 

                                        Appointment: Akanksha Driver

WPtel:[phone]

                                        [address] [place], [place] [id number]

                                              ACUTE ILLNESS   2017

 

             Patient Education: Patient Medication Summary                      

     Completed    2017

 

                                        Appointment: Akanksha Driver

WPtel:[phone]

                                        [address] [place], [place] [address]

[place]                                              LAB             2017

 

             Patient Education: Patient Medication Summary                      

     Completed    2017

 

                          Visit Diagnosis Plan: Mixed hyperlipidemia Discussion:

 Check lipids

                                        ICD-9 : 272.4

ICD-10 : E78.2

                                                    2017

 

                          Visit Diagnosis Plan: Mastodynia Discussion: Check Jace

ateral diagnostic 

mammogram with US

                                        ICD-9 : 611.71

ICD-10 : N64.4

                                                    2017

 

                          Visit Diagnosis Plan: Impaired fasting glucose Discuss

ion: Return in AM for CMP,

HbA1C Accuchecks daily Diet/Exercise discussed at length

                                        ICD-9 : 790.29

ICD-10 : R73.01

                                                    2017

 

                          Visit Diagnosis Plan: Other fatigue Discussion: Check 

CBC, TSH

                                        ICD-9 : 780.79

ICD-10 : R53.83

                                                    2017

 

                                        Appointment: Akanksha Drivertel:[phone]

                                        [address] [place] [address]              3/ confirmed `sl                 ACUTE ILLNESS   2017

 

             Patient Education: Patient Medication Summary                      

     Completed    2017

 

                    Care Plan: MAMMOGRAM SCREENING                     LOINC : 2

[id number]

                          Pending                   2017

 

                          Visit Diagnosis Plan: Acute upper respiratory infectio

n, unspecified Discussion:

Exam is reassuring Likely viral illness Supportive care reviewed and encouraged 
Follow up PRN

                                        ICD-9 : 465.9

ICD-10 : J06.9

                                                    2017

 

                                        Appointment: Luba Stevenson 

                                        [address] [place] [address]                                              ACUTE ILLNESS   2017

 

             Patient Education: Patient Medication Summary                      

     Completed    2017

 

                          Visit Plan:               Supportive care. Rest, Fluid

s, Tylenol/Motrin prn fever or 

bodyaches. Notify if worsening symptoms.

                                                            2016

 

                                        Appointment: Akanksha Drivertel:[phone]

                                        [address] [place] [address]                                              ACUTE ILLNESS   2016

 

             Patient Education: Patient Medication Summary                      

     Completed    2016

 

                                        Appointment: Akanksha Driver

WPtel:[phone]

                                        [address] [place] [address]

[place]                                              INJECTION       10/07/2016

 

             Patient Education: Patient Medication Summary                      

     Completed    10/07/2016

 

                                        Appointment: Akanksha Driver

WPtel:[phone]

                                        [address] [place] [address]                                              LAB             2016

 

             Patient Education: Patient Medication Summary                      

     Completed    2016

 

                          Visit Plan:               Add miralax daily Use daily 

probiotic and metamucil and push fluids

Notify if worsens/persists

                                                            2016

 

                                        Appointment: Akanksha Driver

WPtel:[phone]

                                        [address] [facility]

              16 confirmed ~sl                 ACUTE ILLNESS   2016

 

             Patient Education: Patient Medication Summary                      

     Completed    2016

 

                          Visit Plan:               No NSAIDs Kidney function di

scussed Discussed hydration Monitor lab

every 4months so will check CMP, HbA1C next month

                                                            2016

 

                                        Appointment: Akanksha Driver

WPtel:[phone]

                                        [address] [place] [address]              03/15 confirmed ~sl                 ACUTE ILLNESS   2016

 

             Patient Education: Patient Medication Summary                      

     Completed    2016

 

                          Visit Plan:               Vestibular exercises Refill 

flonase Strict low Na diet--discussed 

that needs to read labels Rx for walker with chair given due to muscle 
weakness/unsteadiness Has carotids and abdominal aorta and legs checked in 
January Check Chem 7

                                                            2015

 

                                        Appointment: Akanksha Driver

WPtel:[phone]

                                        [address] [place] [address]              12/15/15 appt confirmed cn                 FOLLOW UP       

 

             Patient Education: Patient Medication Summary                      

     Completed    2015

 

             Patient Education: Vertigo                           Completed    1

2015

 

                                        Appointment: Akanksha Driver

WPtel:[phone]

                                        [address] [place], [place] [address]

[place]                                              INJECTION       10/16/2015

 

             Patient Education: Patient Medication Summary                      

     Completed    10/16/2015

 

                                        Appointment: Akanksha Driver

WPtel:[phone]

                                        [address] [place], [place] [address]

US                                              UA              09/15/2015

 

             Patient Education: Patient Medication Summary                      

     Completed    09/15/2015

 

                                        Referral: Landon De La Torre

WPtel:[phone]

#1 [facility] ROSA

[id number]

US              Referral                        Completed       2015

 

                                        Appointment: Akanksha Driver

WPtel:[phone]

                                        [address] [place], [place] [id number]

US                                              LAB             09/10/2015

 

             Patient Education: Patient Medication Summary                      

     Completed    09/10/2015

 

                          Visit Plan:               Check CMP, CBC, TSH, Free T4

, B12, Lipids, HbA1C Discussed 6 small 

meals a day each with protein Would likely benefit from antidepressant Update 
colonoscopy

                                                            2015

 

                                        Appointment: Akanksha Driver

WPtel:[phone]

                                        [address] [place], [place] [id number]              9/8/15 confirmed                 ACUTE ILLNESS   2015

 

             Patient Education: Patient Medication Summary                      

     Completed    2015

 

                          Visit Plan:               Cerumen flush with warm wate

r and peroxide - good results Resume 

Nasonex nasal spray daily Recommended Debrox earwax removal drops

                                                            2015

 

                                        Appointment: Bertha Mon

WPtel:[phone]

                                        [address] [place] [address]                                              ACUTE ILLNESS   2015

 

             Patient Education: Patient Medication Summary                      

     Completed    2015

 

                          Visit Plan:               Continue aspirin daily Add m

eloxicam for 1week Elevate and Ice Call

on 2015

 

                                        Appointment: Akanksha Driver

WPtel:[phone]

                                        [address] [place] [address]                                              ACUTE ILLNESS   2015

 

             Patient Education: Patient Medication Summary                      

     Completed    2015

 

                          Visit Plan:               Been using baclofen at [facility] and has helped some PT for next 

2-4weeks

                                                            2015

 

                                        Appointment: Akanksha Driver

WPtel:[phone]

                                        [address] [place], [place] [address]                                              Hospital Follow Up 2015

 

             Patient Education: Patient Medication Summary                      

     Completed    2015

 

                                        Appointment: Akanksha Driver

WPtel:[phone]

                                        [address] [place], [place] [id number]                                              LAB             2015

 

                                        Appointment: Akanksha Driver

WPtel:[phone]

                                        [address] [place] [address]              feeling better, weather -                 FOLLOW UP       

 

                                        Referral: Landon De La Torre

WPtel:[phone]

#1 [facility]              Referral                        Appointment Requested 2015

 

                          Visit Plan:               Continue exercise and curren

t meds See surgery for removal of right

arm lesion

                                                            2015

 

                                        Appointment: Akanksha Driver

WPtel:[phone]

                                        [address] [place] [address]                                              FOLLOW UP       2015

 

             Patient Education: Patient Medication Summary                      

     Completed    2015

 

                                        Appointment: Akanksha Driver

WPtel:[phone]

                                        [address] [place], [place] [address]

US                                              INJECTION       10/17/2014

 

             Patient Education: Patient Medication Summary                      

     Completed    10/17/2014

 

                                        Appointment: Bertha Mon

WPtel:[phone]

                                        [address] [place] [address]                                              ACUTE ILLNESS   2014

 

             Patient Education: Patient Medication Summary                      

     Completed    2014

 

                          Visit Plan:               Discussed that likely lumbar

 etiology for leg weakness Will change 

amlodopine to low dose dyazide and see if helps legs and inner ear

                                                            2014

 

                                        Appointment: Akanksha Driver

WPtel:[phone]

                                        [address] [place] [address]                                              FOLLOW UP       2014

 

             Patient Education: Patient Medication Summary                      

     Completed    2014

 

                          Visit Plan:               Ceftin and continue claritin

/flonase Decrease amlodopine to 2.5mg q

HS until fwup with Card due to weakness

                                                            2014

 

                                        Appointment: Akanksha Driver

WPtel:[phone]

                                        [address] [place] [address]                                              ACUTE ILLNESS   2014

 

             Patient Education: Patient Medication Summary                      

     Completed    2014

 

                                        Appointment: Akanksha Driver

WPtel:[phone]

                                        [address] [place], [place] [id number]                                              LAB             2014

 

             Patient Education: Patient Medication Summary                      

     Completed    2014

 

                                        Appointment: Bertha Mon

WPtel:[phone]

                                        [address] [place], [place] [id number]                                              ACUTE ILLNESS   2014

 

             Patient Education: Patient Medication Summary                      

     Completed    2014

 

                          Visit Plan:               Supportive care. Rest, Fluid

s, Tylenol prn fever or bodyaches. 

Notify if worsening symptoms. Ceftin

                                                            10/15/2013

 

                                        Appointment: Bertha Mon

WPtel:[phone]

                                        [address] [place] [address]                                              ACUTE ILLNESS   10/15/2013

 

             Patient Education: Patient Medication Summary                      

     Completed    10/15/2013

 

                                        Appointment: Akanksha Driver

WPtel:[phone]

                                        [address] [place] [address]                                              BP CHECK        2013

 

             Patient Education: Patient Medication Summary                      

     Completed    2013

 

                                        Appointment: Akanksha Driver

WPtel:[phone]

                                        [address] [place] [address]                                              ER Follow UP    2013

 

             Patient Education: Patient Medication Summary                      

     Completed    2013

 

                          Visit Plan:               Restart flonase BID Use mecl

izine 25mg q HS Vestibular exercises 

Claritin 10mg q AM See ENT if doesn't resolve

                                                            2013

 

                                        Appointment: Akanksha Driver

WPtel:[phone]

                                        [address] [place] [address]                                              ACUTE ILLNESS   2013

 

             Patient Education: Patient Medication Summary                      

     Completed    2013

 

                          Visit Plan:               Will continue to observe

                                                            2013

 

                                        Appointment: Akanksha Driver

WPtel:[phone]

                                        [address] [place] [address]                                              FOLLOW UP       2013

 

             Patient Education: Patient Medication Summary                      

     Completed    2013

 

                          Visit Plan:               ERx for Augmentin 875mg q12 

x 10 days and Floxin otic drops 5 gtts 

AD x7days Sample of Nasonex per pt request Discussed treatment (nasal saline, 
salt water gargles, keeping right ear clean and dry, for worsening go to UC on 
weekend, Tylenol/ibuprofen, etc.) RTC 2 weeks for ear recheck.

                                                            05/10/2013

 

                                        Appointment: Jill Jean 

WPtel:[phone]

                                        [address] [place] [address]                                              ACUTE ILLNESS   05/10/2013

 

             Patient Education: Patient Medication Summary                      

     Completed    05/10/2013

 

                          Visit Plan:               Decrease caffeine intake Marina

ck Bilateral Mammogram with US of left 

breast

                                                            2013

 

                                        Appointment: Akanksha Driver

WPtel:[phone]

                                        [address] [place] [address]                                              ACUTE ILLNESS   2013

 

             Patient Education: Patient Medication Summary                      

     Completed    2013

 

                                        Appointment: Kate Hall

WPtel:[phone]

                                        [address] [place] [address]              appt scheduled                  ACUTE ILLNESS   2013

 

                                        Appointment: Akanksha Driver

WPtel:[phone]

                                        [address] [place], [place] [address]                                              LAB             2012

 

             Patient Education: Patient Medication Summary                      

     Completed    2012

 

                          Visit Plan:               Continue off carafate and se

e how does Continue probiotic Add 

Vestibular exercises and use meclizine prn Continue Nasonex Check fasting lab 
including CMP, Lipids, CBC, TSH, Free T4, HbA1C

                                                            2012

 

                                        Appointment: Akanksha Driver

WPtel:[phone]

                                        [address] [place] [address]                                              FOLLOW UP       2012

 

             Patient Education: Patient Medication Summary                      

     Completed    2012

 

                          Visit Plan:               Continue carafate for 4more 

weeks at current dose then decrease to 

BID for 2wks then q HS

                                                            10/23/2012

 

                                        Appointment: Akanksha Driver

WPtel:[phone]

                                        [address] [place], [place] [address]                                              FOLLOW UP       10/23/2012

 

             Patient Education: Patient Medication Summary                      

     Completed    10/23/2012

 

                          Visit Plan:               Continue omeprazole Add Ellyn

fate 1gm po q AC Add daily probiotic

                                                            10/10/2012

 

                                        Appointment: Akanksha Driver

WPtel:[phone]

                                        [address] [place] [address]                                              ACUTE ILLNESS   10/10/2012

 

             Patient Education: Patient Medication Summary                      

     Completed    10/10/2012

 

                                        Appointment: Akanksha Driver

WPtel:[phone]

                                        [address] [place], [place] [id number]

US                                              INJECTION       2012

 

             Patient Education: Patient Medication Summary                      

     Completed    2012

 

                          Visit Plan:               Diabetic Diet Accuchecks BID

 alternating times Hold onglyza and 

Januvia for now and continue diet and exercise and weight loss and drew LOREDO 
Discussed hypoglycemia and snack of peanut butter and crackers with juice or 
milk

                                                            2012

 

                                        Appointment: Akanksha Drivertel:[phone]

                                        [address] [place] [address]                                              WORK IN         2012

 

             Patient Education: Patient Medication Summary                      

     Completed    2012

 

                                        Appointment: Akanksha Driver

WPtel:[phone]

                                        [address] [place] [address]              2012

 

             Patient Education: Patient Medication Summary                      

     Completed    2012

 

                                        Appointment: Akanksha Driver

WPtel:[phone]

                                        [address] [place] [address]                                              LAB             2012

 

             Patient Education: Patient Medication Summary                      

     Completed    2012

 

                                        Appointment: Akanksha Driver

WPtel:[phone]

                                        [address] [place] [address]                                              ACUTE ILLNESS   2012

 

             Patient Education: Patient Medication Summary                      

     Completed    2012

 

                          Visit Plan:               Injection to Right SI joint 

as above Pt will call in 3 days on pain

Has PT starting in 2wks.

                                                            2012

 

                                        Appointment: Akanksha Driver

WPtel:[phone]

                                        [address] [place] [address]                                              ACUTE ILLNESS   2012

 

             Patient Education: Patient Medication Summary                      

     Completed    2012

 

                          Visit Plan:               OMT done Start PT May need u

pdated MRI if need to consider epidural

Prednisone

                                                            2012

 

                                        Appointment: Akanksha Driver

WPtel:[phone]

                                        [address] [place] [address]                                              OMT             2012

 

             Patient Education: Patient Medication Summary                      

     Completed    2012

 

                          Visit Plan:               Flexeril and Vimovo OMT done

 Daily stretches

                                                            2012

 

                                        Appointment: Akanksha Driver

WPtel:[phone]

                                        [address] [place], [place] [id number]

                                              ACUTE ILLNESS   2012

 

             Patient Education: Patient Medication Summary                      

     Completed    2012

 

                          Visit Plan:               OMT done Daily stretches Vinod

st heat or biofreeze Right SI joint 

injection Call in 1week Vimovo BID

                                                            2012

 

                                        Appointment: Akanksha Driver

WPtel:[phone]

                                        [address] [place], [place] [address]                                              ACUTE ILLNESS   2012

 

             Patient Education: Patient Medication Summary                      

     Completed    2012

 

                                        Appointment: Akanksha Driver

WPtel:[phone]

                                        [address] [place], [place] [address]

US                                              LAB             2012

 

             Patient Education: Patient Medication Summary                      

     Completed    2012

 

                          Visit Plan:               Decrease amlodopine to 1.25m

g daily Schedule with Cardiology 

Fasting lab in AM

                                                            2012

 

                                        Appointment: Akanksha Driver

WPtel:[phone]

                                        [address] [place] [address]                                              ACUTE ILLNESS   2012

 

             Patient Education: Patient Medication Summary                      

     Completed    2012

 

                          Visit Plan:               Saline nasal flushes prn. Ty

lenol/Motrin prn headache. Notify if 

persists/symptoms worsening. Cerumen removal from ears as above

                                                            2011

 

                                        Appointment: Akanksha Driver

WPtel:[phone]

                                        [address] [place], [place] [id number]

                                              ACUTE ILLNESS   2011

 

             Patient Education: Patient Medication Summary                      

     Completed    2011

 

                                        Appointment: Akanksha Driver

WPtel:[phone]

                                        [address] [place], [place] [address]

US                                              INJECTION       10/05/2011

 

             Patient Education: Patient Medication Summary                      

     Completed    10/05/2011

 

                          Visit Plan:               Continue vimovo for 1more we

ek Increase Omeprazole to BID for 1mo 

then resume QD if stomach improved Vestibular exercises with meclizine q HS for 
next week

                                                            2011

 

                                        Appointment: Akanksha Driver

WPtel:[phone]

                                        [address] [place] [address]                                              FOLLOW UP       2011

 

             Patient Education: Patient Medication Summary                      

     Completed    2011

 

                                        Appointment: Akanksha Driver

WPtel:[phone]

                                        [address] [place] [address]                                              ACUTE ILLNESS   2011

 

             Patient Education: Patient Medication Summary                      

     Completed    2011

 

                                        Appointment: Akanksha Driver

WPtel:[phone]

                                        [address] [place] [address]                                              LAB             2011

 

             Patient Education: Patient Medication Summary                      

     Completed    2011

 

                          Visit Plan:               Saline nasal flushes prn. Ty

lenol/Motrin prn headache. Notify if 

persists/symptoms worsening. Add Veramyst Increase Omeprazole to 20mg po BID

                                                            2011

 

                                        Appointment: Akanksha Driver

WPtel:[phone]

                                        [address] [place] [address]                                              ACUTE ILLNESS   2011

 

             Patient Education: Patient Medication Summary                      

     Completed    2011

 

                                        Appointment: Akanksha Driver

WPtel:[phone]

                                        [address] [place] [address]                                              FOLLOW UP       2011

 

             Patient Education: Patient Medication Summary                      

     Completed    2011

 

                                        Appointment: Akanksha Driver

WPtel:[phone]

                                        [address] [place] [address]                                              ACUTE ILLNESS   2011

 

             Patient Education: Patient Medication Summary                      

     Completed    2011

 

                          Visit Plan:               Nasocourt sample given. Pt. 

will notify if symptoms are worse on 

Monday.

                                                            2010

 

                                        Appointment: Kate Hall

WPtel:[phone]

                                        [address] [place] [address]                                              ACUTE ILLNESS   2010

 

             Patient Education: Patient Medication Summary                      

     Completed    2010

 

                                        Appointment: Akanksha Driver

WPtel:[phone]

                                        [address] [place], [place] [address]

US                                              INJECTION       10/06/2010

 

             Patient Education: Patient Medication Summary                      

     Completed    10/06/2010

 

                                        Appointment: Akanksha Driver

WPtel:[phone]

                                        [address] [place] [address]                                              FOLLOW UP       2010

 

             Patient Education: Patient Medication Summary                      

     Completed    2010

 

             Patient Education: Lexapro                           Completed    0

2010

 

                          Visit Plan:               May proceed with hiatal mykel

ia repair per Card. so will contact Dr. Cash's office to proceed with surgery Trial of Lexapro 5mg QD plus use 
xanax prn

                                                            2010

 

                                        Appointment: Akanksha Driver

WPtel:[phone]

                                        [address] [place] [address]                                              FOLLOW UP       2010

 

             Patient Education: Patient Medication Summary                      

     Completed    2010

 

                          Visit Plan:               B12 given Cont oral B12 and 

iron Fwup 1mo for B12 Proceed with 

hiatal hernia repair once card clearance

                                                            2010

 

                                        Appointment: Akanksha Driver

WPtel:[phone]

                                        [address] [place] [address]                                              FOLLOW UP       2010

 

             Patient Education: Patient Medication Summary                      

     Completed    2010

 

                                        Appointment: Akanksha Driver

WPtel:[phone]

                                        [address] [place] [address]                                              FOLLOW UP       2010

 

             Patient Education: Patient Medication Summary                      

     Completed    2010

 

                                        Appointment: Akanksha Driver

WPtel:[phone]

                                        [address] [place] [address]

[place]                                              LAB             2010

 

             Patient Education: Patient Medication Summary                      

     Completed    2010

 

                          Visit Plan:               Check fasting lab in AM--CMP

,Lipids, CBC, Vit D, TSH,FreeT4, B12

                                                            2010

 

                                        Appointment: Akanksha Driver

WPtel:[phone]

                                        [address] [place] [address]

[place]                                              FOLLOW UP       2010

 

             Patient Education: Patient Medication Summary                      

     Completed    2010

 

                                        Referral: Landon De La Torre

WPtel:[phone]

[address] [place] [address]

[place]              Referral                        Appointment Requested  

 

                                        Referral: Landon De La Torre

WPtel:[phone]

[facility]

[id number]

[place]              Referral                        Initiated        







Instructions





                                        Comment

 

                                        . Supportive care.  Rest, Fluids, Tyleno

l/Motrin prn fever or bodyaches.  Notify

if worsening symptoms.



 

                                        . Add miralax daily

Use daily probiotic and metamucil and push fluids

Notify if worsens/persists

 

                                        . No NSAIDs

Kidney function discussed

Discussed hydration 

Monitor lab every 4months so will check CMP, HbA1C next month

 

                                        . Vestibular exercises

Refill flonase

Strict low Na diet--discussed that needs to read labels

Rx for walker with chair given due to muscle weakness/unsteadiness

Has carotids and abdominal aorta and legs checked in January

Check Chem 7



 

                                        . Check CMP, CBC, TSH, Free T4, B12, Lip

ids, HbA1C

Discussed 6 small meals a day each with protein

Would likely benefit from antidepressant 

Update colonoscopy

 

                                        . Cerumen flush with warm water and tyler

xide - good results 

Resume Nasonex nasal spray daily

Recommended Debrox earwax removal drops 

 

                                        . Continue aspirin daily

Add meloxicam for 1week

Elevate and Ice

Call on Monday

 

                                        . Been using baclofen at bedtime and has

 helped some

PT for next 2-4weeks



 

                                        . Continue exercise and current meds

See surgery for removal of right arm lesion

 

                                        . Discussed that likely lumbar etiology 

for leg weakness

Will change amlodopine to low dose dyazide and see if helps legs and inner ear

 

                                        . Ceftin and continue claritin/flonase

Decrease amlodopine to 2.5mg q HS until fwup with Card due to weakness

 

                                        .  Supportive care.  Rest, Fluids, Tylen

ol prn fever or bodyaches.  Notify if 

worsening symptoms.

Ceftin

 

                                        . Restart flonase BID

Use meclizine 25mg q HS

Vestibular exercises

Claritin 10mg q AM

See ENT if doesn't resolve

 

                                        . Will continue to observe



 

                                        . ERx for Augmentin 875mg q12 x 10 days 

and Floxin otic drops 5 gtts AD x7days

Sample of Nasonex per pt request

Discussed treatment (nasal saline, salt water gargles, keeping right ear clean 
and dry, for worsening go to UC on weekend, Tylenol/ibuprofen, etc.)

RTC 2 weeks for ear recheck.

 

                                        . Decrease caffeine intake

Check Bilateral Mammogram with US of left breast

 

                                        Left ear flushed with warm water and per

oxide with ear syringe and then last 

removed with currette--peroxide drops instilled.  Tolerated well, no 
complications, TM intact.. Continue off carafate and see how does

Continue probiotic

Add Vestibular exercises and use meclizine prn

Continue Nasonex

Check fasting lab including CMP, Lipids, CBC, TSH, Free T4, HbA1C

 

                                        . Continue carafate for 4more weeks at c

urrent dose then decrease to BID for 

2wks then q HS

 

                                        . Continue omeprazole

Add Carafate 1gm po q AC

Add daily probiotic



 

                                        . Diabetic Diet

Accuchecks BID alternating times

Hold onglyza and Januvia for now and continue diet and exercise and weight loss 
and moniter BS

Discussed hypoglycemia and snack of peanut butter and crackers with juice or 
milk

 

                                        Informed Consent obtainded.  Right SI yasir

int cleansed with alcohol and betadine 

and injected with 3cc 1%l lidocaine with 40mg depomedrol and 40mg kenalog, 
tolerated well with no complications, neosporin and bandage applied. Injection 
to Right SI joint as above

Pt will call in 3 days on pain

Has PT starting in 2wks.

 

                                        . OMT done

Start PT

May need updated MRI if need to consider epidural

Prednisone

 

                                        . Flexeril and Vimovo

OMT done

Daily stretches

 

                                        Right SI joint cleansed with alchohol an

d betadine and injected with 3cc 1% 

lidocaine with 40mg depomedrol and 40mg kenalog, tolerated well with no 
complications, neosporin and bandage applied. OMT done

Daily stretches

Moist heat or biofreeze

Right SI joint injection

Call in 1week

Vimovo BID

 

                                        . Decrease amlodopine to 1.25mg daily

Schedule with Cardiology

Fasting lab in AM

 

                                        .  Saline nasal flushes prn. Tylenol/Mot

rin prn headache.  Notify if 

persists/symptoms worsening.

Cerumen removal from ears as above



 

                                        . Continue vimovo for 1more week

Increase Omeprazole to BID for 1mo then resume QD if stomach improved

Vestibular exercises with meclizine q HS for next week

 

                                        . Saline nasal flushes prn. Tylenol/Motr

in prn headache.  Notify if 

persists/symptoms worsening.

Add Veramyst

Increase Omeprazole to 20mg po BID

 

                                        . Nasocourt sample given.  Pt. will noti

fy if symptoms are worse on Monday. 

 

                                        . May proceed with hiatal hernia repair 

per Card. so will contact Dr. 

Beckenhauer's office to proceed with surgery



Trial of Lexapro 5mg QD plus use xanax prn

 

                                        . B12 given

Cont oral B12 and iron

Fwup 1mo for B12

Proceed with hiatal hernia repair once card clearance

 

                                        . Check fasting lab in AM--CMP,Lipids, C

BC, Vit D, TSH,FreeT4, B12







Medical Equipment

No Medical Equipment data



Health Concerns Section

Health Concerns data not found



Goals Section

Goals data not found



Interventions Section

Interventions data not found



Health Status Evaluations/Outcomes Section

Health Status Evaluations/Outcomes data not found



Advance Directives

No Advance Directive data

## 2020-02-19 NOTE — XMS REPORT
CCD document using C-CDA

                             Created on: 2020



Leilani Ramírez

External Reference #: [id number]

: 1940

Sex: Female



Demographics





                          Address                   [address] [place]  [address]

 

                          Home Phone                [phone]

 

                          Preferred Language        Unknown

 

                          Marital Status            

 

                          Judaism Affiliation     Unknown

 

                          Race                      White

 

                          Ethnic Group              Not  or 





Author





                          Author                    Leilani Driver D.O.

 

                          Organization              [facility]

 

                          Address                   [address] [place]  [address]



 

                          Phone                     [phone]







Care Team Providers





                    Care Team Member Name Role                Phone

 

                    Akanksha Driver D.O. PP                  Unavailable

 

                          CCM                       Unavailable







Summary Purpose

Interface Exchange



Insurance Providers





             Payer name   Policy type / Coverage type Covered party ID Effective

 Begin Date 

Effective End Date

 

             [employer] [place] B [id number]  2018   

  Unknown

 

             [employer] Senior Supplemental Medicare Part B [id number]   [id number]    

 Unknown







Family history





Father



                          Diagnosis                 Age At Onset

 

                          Heart disease             Unknown







Mother



                          Diagnosis                 Age At Onset

 

                          Heart disease             Unknown

 

                          Cancer                    Unknown







Social History





                Social History Element Codes           Description     Effective

 Dates

 

                Tobacco history SNOMED CT: 066249572 Never smoker    2011

 

                Marital status  Unknown         Single          2010

 

                Number of children Unknown         9               2010







Allergies, Adverse Reactions, Alerts





           Substance  Reaction   Codes      Entered Date Inactivated Date Status

 

           _                     Unknown    2019 No Inactive Date Active

 

           * NO KNOWN FOOD ALLERGIES            Unknown    2010 No Inactiv

e Date Active

 

           _                     Unknown    2010 No Inactive Date Active

 

           QUINIDINE-QUININE ANALOGUES hives,     Unknown    2010 No Inact

diamante Date Active







Problems





                Condition       Codes           Effective Dates Condition Status

 

                          Essential (primary) hypertension ICD-9: 401.9

ICD-10: I10               2014                Active

 

                          Palpitations              ICD-9: 785.1

ICD-10: R00.2             10/02/2018                Active

 

                          Stress reaction           ICD-9: 308.9

ICD-10: F43.0             2020                Active

 

                          Mixed hyperlipidemia      ICD-9: 272.4

ICD-10: E78.2             2019                Active

 

                          FLU VACCINE               ICD-9: V04.81

ICD-10: Z23               2012                Active

 

                          Acute serous otitis media, left ear ICD-9: 381.01

ICD-10: H65.02            2019                Active

 

                          Coronary atherosclerosis due to calcified coronary les

ion ICD-9: 414.00

ICD-10: I25.84            2018                Active

 

                          Hypoglycemia, unspecified ICD-9: 251.2

ICD-10: E16.2             2017                Active

 

                          Other fatigue             ICD-9: 780.79

ICD-10: R53.83            2017                Active

 

                          Urinary tract infection, site not specified ICD-9: 599

.0

ICD-10: N39.0             10/02/2018                Active

 

                          Gastro-esophageal reflux disease without esophagitis I

CD-9: 530.81

ICD-10: K21.9             2018                Active

 

                          Epigastric pain           ICD-9: 789.06

ICD-10: R10.13            2018                Active

 

                          Atherosclerotic heart disease of native coronary arter

y without angina pectoris 

ICD-9: 414.00

ICD-10: I25.10            2018                Active

 

                          Generalized anxiety disorder ICD-9: 300.00

ICD-10: F41.1             2018                Active

 

                          Dizziness and giddiness   ICD-9: 780.4

ICD-10: R42               2014                Active

 

                          Occlusion and stenosis of bilateral carotid arteries I

CD-9: 433.10

ICD-10: I65.23            2018                Active

 

                          Cervicalgia               ICD-9: 723.1

ICD-10: M54.2             2018                Active

 

                          Other spondylosis with radiculopathy, cervical region 

ICD-9: 721.0

ICD-10: M47.22            2018                Active

 

                          Cervicocranial syndrome   ICD-9: 723.2

ICD-10: M53.0             2018                Active

 

                          Vertigo of central origin, bilateral ICD-9: 386.2

ICD-10: H81.43            2018                Active

 

                          Benign paroxysmal vertigo, bilateral ICD-9: 386.11

ICD-10: H81.13            2018                Active

 

                          Otalgia, bilateral        ICD-9: 388.70

ICD-10: H92.03            2018                Active

 

                          Left lower quadrant pain  ICD-9: 789.04

ICD-10: R10.32            2017                Active

 

                          Low back pain             ICD-9: 724.2

ICD-10: M54.5             2017                Active

 

                          Radiculopathy, lumbosacral region ICD-9: 724.4

ICD-10: M54.17            2018                Active

 

                          Impaired fasting glucose  ICD-9: 790.29

ICD-10: R73.01            2012                Active

 

                          Other intervertebral disc degeneration, lumbar region 

ICD-9: 722.52

ICD-10: M51.36            2017                Active

 

                          Pain in thoracic spine    ICD-9: 724.1

ICD-10: M54.6             2017                Active

 

                          Mastodynia                ICD-9: 611.71

ICD-10: N64.4             2017                Active

 

                          Acute upper respiratory infection, unspecified ICD-9: 

465.9

ICD-10: J06.9             2017                Active

 

                          Acute mastoiditis without complications, right ear ICD

-9: 383.00

ICD-10: H70.001           2016                Active

 

                          Constipation, unspecified ICD-9: 564.00

ICD-10: K59.00            2016                Active

 

                          Chronic kidney disease, stage 1 ICD-9: 585.1

ICD-10: N18.1             03/15/2016                Active

 

                          History of falling        ICD-9: V15.88

ICD-10: Z91.81            12/15/2015                Active

 

                          Muscle weakness (generalized) ICD-9: 780.79

ICD-10: M62.81            2015                Active

 

                Acute renal failure ICD-9: 584.9    2015      Active

 

                POLYURIA        ICD-9: 788.42   2015      Active

 

                CAD             ICD-9: 414.00   09/10/2015      Active

 

                Hyperglycemia   ICD-9: 790.29   2012      Active

 

                HYPERLIPIDEMIA NEC/NOS ICD-9: 272.4    09/10/2015      Active

 

                ABDOMINAL PAIN  ICD-9: 789.00   10/10/2012      Active

 

                Grieving        ICD-9: 309.0    2015      Active

 

                HYPOGLYCEMIA    ICD-9: 251.2    2012      Active

 

                MALAISE AND FATIGUE ICD-9: 780.79   2015      Active

 

                CERUMEN IMPACTION ICD-9: 380.4    2015      Active

 

                Leg pain        ICD-9: 729.5    2015      Active

 

                SUPERFIC PHLEBITIS-LEG ICD-9: 451.0    2015      Active

 

                SPASM OF MUSCLE ICD-9: 728.85   2015      Active

 

                Thoracic back pain ICD-9: 724.1    2015      Active

 

                Benign positional vertigo ICD-9: 386.11   2015      Active

 

                Suspicious nevus ICD-9: 238.2    2015      Active

 

                EUSTACHIAN TUBE DYSFUNCTION ICD-9: 381.81   2014      Acti

ve

 

                SINUSITIS, ACUTE ICD-9: 461.9    2014      Active

 

                DIZZINESS/VERTIGO ICD-9: 780.4    2014      Active

 

                HYPERTENSION    ICD-9: 401.9    2014      Active

 

                URI, ACUTE      ICD-9: 465.9    10/15/2013      Active

 

                CEPHALGIA       ICD-9: 784.0    2013      Active

 

                OTITIS MEDIA NOS ICD-9: 382.9    2013      Active

 

                Perforation of ear drum ICD-9: 384.20   05/10/2013      Active

 

                Mastalgia       ICD-9: 611.71   2013      Active

 

                DYSPEPSIA       ICD-9: 536.8    10/10/2012      Active

 

                IBS             ICD-9: 564.1    10/10/2012      Active

 

                FLU VACCINE     ICD-9: V04.81   2012      Active

 

                Radiculopathy of leg ICD-9: 724.4    2012      Active

 

                SCIATICA        ICD-9: 724.3    2012      Active

 

                Lumbar degenerative disc disease ICD-9: 722.52   2012     

 Active

 

                Sacroiliac dysfunction ICD-9: 739.4    2012      Active

 

                Hypertension    Unknown         2012      Active

 

                PAIN, LOWER BACK ICD-9: 724.2    2011      Active

 

                SPINAL ENTHESOPATHY ICD-9: 720.1    2011      Active

 

                Hypotension     ICD-9: 458.9    2011      Active

 

                SACROILIITIS NEC ICD-9: 720.2    2011      Active

 

                PHARYNGITIS, ACUTE ICD-9: 462      2010      Active

 

                URINARY TRACT INFECTION ICD-9: 599.0    2010      Active

 

                Heat exhaustion ICD-9: 992.5    2010      Active

 

                Esophagitis     ICD-9: 530.10   2010      Active

 

                Gastritis       ICD-9: 535.50   2010      Active

 

                GERD            ICD-9: 530.81   2010      Active

 

                Hiatal hernia   ICD-9: 553.3    2010      Active

 

                B12 DEFIC ANEMIA NEC ICD-9: 281.1    2010      Active

 

                Anxiety         Unknown         2010      Active

 

                Heart disease   Unknown         2010      Active

 

                Hyperlipidemia  Unknown         2010      Active

 

                ANXIETY STATE NOS ICD-9: 300.00   2010      Active

 

                DEPRESSIVE DISORDER NEC ICD-9: 311      2010      Active







Medications





          Medication Codes     Instructions Start Date Stop Date Status    Fill 

Instructions

 

                    metoprolol tartrate 25 mg tablet RxNorm: 574855      1 Table

t(s) Oral QAM and two 

tablets (50mg) in the evening 2020      Active           

 

                    Flonase Allergy Relief 50 mcg/actuation nasal spray,suspensi

on RxNorm: 4666359     2

Spray Nasal every night at bedtime 2020      Inactive     

    

 

           simvastatin 40 mg tablet RxNorm: 170308 1 Tablet(s) PO QD 2019 

02/15/2020 

Active                                   

 

                omeprazole 40 mg capsule,delayed release RxNorm: 470513  1 Capsu

le(s) Oral QD 

2019          Active               

 

                Xanax 0.25 mg tablet RxNorm: 380014  TAKE 1 TABLET BY MOUTH TWIC

E DAILY 

10/29/2019          No Stop Date        Active               

 

                omeprazole 40 mg capsule,delayed release RxNorm: 882616  1 Capsu

le(s) PO QD 

10/07/2019          2019          Inactive             

 

             metoprolol tartrate 25 mg tablet RxNorm: 741297 1 Tablet(s) PO BID 

2019                Inactive                   

 

                omeprazole 40 mg capsule,delayed release RxNorm: 709518  1 Capsu

le(s) PO QD 

2019          10/06/2019          Inactive             

 

                    Flonase Allergy Relief 50 mcg/actuation nasal spray,suspensi

on RxNorm: 2374921     2

[place] QHS 2019      Inactive         

 

           simvastatin 40 mg tablet RxNorm: 487052 1 Tablet(s) PO QD 2019 

Inactive                                 

 

           simvastatin 40 mg tablet RxNorm: 854766 1 Tablet(s) PO QD 2019 

Inactive                                 

 

                omeprazole 40 mg capsule,delayed release RxNorm: 316562  1 Capsu

le(s) PO QD 

2019          Inactive             

 

           simvastatin 40 mg tablet RxNorm: 242747 1 Tablet(s) PO QD 2019 

Inactive                                 

 

                omeprazole 40 mg capsule,delayed release RxNorm: 172613  1 Capsu

le(s) PO QD 

2019          Inactive             

 

             Bactrim  mg-160 mg tablet RxNorm: 644908 1 Tablet(s) PO BID 0

3/08/2019   

2019                Inactive                   

 

             Bactrim  mg-160 mg tablet RxNorm: 758280 1 Tablet(s) PO BID 0

3/08/2019   

2019                Inactive                   

 

             Macrobid 100 mg capsule RxNorm: 260644 1 Capsule(s) PO BID 20

                          Inactive                   

 

             metoprolol tartrate 25 mg tablet RxNorm: 482481 1 Tablet(s) PO BID 

2019                Inactive                   

 

                Xanax 0.25 mg tablet RxNorm: 059760  TAKE 1 TABLET BY MOUTH TWIC

E DAILY 

2018          10/28/2019          Inactive             

 

           Xanax 0.25 mg tablet RxNorm: 126318 1 Tablet(s) PO BID 2018 

Inactive                                 

 

                    Protonix 40 mg tablet,delayed release RxNorm: 721102      1 

Tablet(s) PO BID for 

stomach--replaces omeprazole 2018      Inactive         

 

             Carafate 1 gram tablet RxNorm: 355581 1 Tablet(s) PO AC & HS 2019                Inactive                   

 

           Xanax 0.25 mg tablet RxNorm: 715928 1 Tablet(s) PO BID 10/30/2018 12/

10/2018 

Inactive                                 

 

             Bactrim  mg-160 mg tablet RxNorm: 938831 1 Tablet(s) PO BID 1

   

10/08/2018                Inactive                   

 

             Bactrim  mg-160 mg tablet RxNorm: 022669 1 Tablet(s) PO BID 1

   

10/03/2018                Inactive                   

 

             Carafate 1 gram tablet RxNorm: 628341 1 Tablet(s) PO AC & HS 09/18/

2018   

10/17/2018                Inactive                   

 

                    Protonix 40 mg tablet,delayed release RxNorm: 383578      1 

Tablet(s) PO BID for 

stomach--replaces omeprazole 2018      Inactive         

 

                    Protonix 40 mg tablet,delayed release RxNorm: 942603      1 

Tablet(s) PO BID for 

stomach--replaces omeprazole 2018      Inactive         

 

                    fluticasone 50 mcg/actuation nasal spray,suspension RxNorm: 

3538596     2 Spray 

NASAL QD to each nostril 2018      Inactive         

 

             Nasonex 50 mcg/actuation Spray RxNorm: 1801732 2 [place] [facility] 2018                Inactive                   

 

                omeprazole 40 mg capsule,delayed release RxNorm: 190327  1 Capsu

le(s) PO QD 

2018          08/15/2018          Inactive             

 

                    simvastatin 40 mg tablet RxNorm: 509207      1 Tablet(s) PO 

QD TAKE 1 TABLET EVERY 

DAY             2018      Inactive         

 

             metoprolol tartrate 25 mg tablet RxNorm: 111447 1/2 Tablet(s) PO QD

 2018                Inactive                   

 

                simvastatin 40 mg tablet RxNorm: 647473  Tablet(s) TAKE 1 TABLET

 EVERY DAY 

2017          Inactive             

 

             metoprolol tartrate 25 mg tablet RxNorm: 581380 1/2 Tablet(s) PO QD

 2017                Inactive                   

 

                    Flonase 50 mcg/actuation nasal spray,suspension RxNorm: 1797

933     2 [place] [facility] 

BID             2017      Inactive         

 

                omeprazole 40 mg capsule,delayed release RxNorm: 116374  1 Capsu

le(s) PO QD 

2017          Inactive             

 

             metoprolol tartrate 25 mg tablet RxNorm: 069627 1/2 Tablet(s) PO QD

 04/10/2017   

2017                Inactive                   

 

                    ciprofloxacin 0.2 % ear drops in a dropperette RxNorm: 81412

6      4 Drop(s) OTIC TID

for 1 week      2016      Inactive         

 

           cefdinir 300 mg capsule RxNorm: 202029 2 Capsule(s) PO QD 2016 

Inactive                                 

 

                    omeprazole 40 mg capsule,delayed release RxNorm: 074166     

 TAKE 1 CAPSULE EVERY DAY

                2016      Inactive         

 

             simvastatin 40 mg tablet RxNorm: 800440 TAKE 1 TABLET EVERY DAY 2017                Inactive                   

 

                    Flonase 50 mcg/actuation nasal spray,suspension RxNorm: 1797

933     2 [place] [facility] 

BID             2015      Inactive         

 

             cefuroxime axetil 250 mg tablet RxNorm: 623074 1 Tablet(s) PO BID 0

2015                Inactive                   

 

             cefuroxime axetil 250 mg tablet RxNorm: 344149 1 Tablet(s) PO BID 0

2015                Inactive                   

 

                omeprazole 40 mg capsule,delayed release RxNorm: 457020  1 Capsu

le(s) PO QD 

2015          Inactive             

 

           meloxicam 7.5 mg tablet RxNorm: 180992 1 Tablet(s) PO QD 2015 0

2015 

Inactive                                 

 

                loratadine 10 mg tablet RxNorm: 281855  1 Tablet(s) PO QAM for a

llergies 

2014          Inactive             

 

                    Flonase 50 mcg/actuation nasal spray,suspension RxNorm: 8963

23      2 [place] [facility]AL BID

                2014      12/15/2015      Inactive         

 

           prednisone 20 mg tablet RxNorm: 756355 2 Tablet(s) PO BID 2014 

Inactive                                 

 

             Dyazide 37.5 mg-25 mg capsule RxNorm: 174779 1 Capsule(s) PO QAM 2014                Inactive                   

 

           Ceftin 500 mg tablet RxNorm: 626313 1 Tablet(s) PO BID 2014 

Inactive                                 

 

           Ceftin 500 mg tablet RxNorm: 442691 1 Tablet(s) PO BID 2014 

Inactive                                 

 

                    simvastatin 40 mg tablet RxNorm: 684482      Tablet(s) PO TA

KE ONE TABLET BY MOUTH 

EVERY DAY       2014      Inactive         

 

                    metoprolol tartrate 25 mg tablet RxNorm: 168962      Tablet(

s) PO TAKE ONE-HALF 

TABLET BY MOUTH EVERY DAY 2014      Inactive         

 

           Ceftin 500 mg tablet RxNorm: 118053 1 Tablet(s) PO BID 10/15/2013 10/

 

Inactive                                 

 

                loratadine 10 mg tablet RxNorm: 431277  1 Tablet(s) PO QAM for a

llergies 

2013          Inactive             

 

                    omeprazole 20 mg capsule,delayed release RxNorm: 058525     

 Capsule(s) PO TAKE ONE 

CAPSULE BY MOUTH TWICE DAILY 2013      Inactive         

 

                omeprazole 20 mg capsule,delayed release RxNorm: 564127  1 Capsu

le(s) PO BID 

2013          Inactive             

 

             Augmentin 875 mg-125 mg tablet RxNorm: 450374 1 Tablet(s) PO Q12H 0

5/10/2013   

2013                Inactive                   

 

             Floxin Otic Drops 1 bottle Drops RxNorm:      5 Drop(s) OTIC BID 05

/10/2013   

2013                Inactive                   

 

                    metoprolol tartrate 25 mg tablet RxNorm: 054886      Tablet(

s) PO TAKE ONE-HALF 

TABLET BY MOUTH EVERY DAY 2013      Inactive         

 

                    simvastatin 40 mg tablet RxNorm: 149547      Tablet(s) PO TA

KE ONE TABLET BY MOUTH 

EVERY DAY       2013      Inactive         

 

                lancets         RxNorm:         Misc Miscellaneous USE ONE TO CH

YOGI GLUCOSE EVERY DAY 

2013          Inactive             

 

             Carafate 1 gram tablet RxNorm: 023478 1 Tablet(s) PO AC & HS 2015                Inactive                   

 

           Flagyl 500 mg tablet RxNorm: 694801 1 Tablet(s) PO TID 10/10/2012 10/

 

Inactive                                 

 

             Carafate 1 gram tablet RxNorm: 216853 1 Tablet(s) PO AC & HS 10/10/

2012   

2012                Inactive                   

 

          One Touch Test strips RxNorm:   Miscellaneous QD 2012

 Inactive  

one touch ultra mini test strips

 

           Protonix 40 mg Tab RxNorm: 932654 1 Tablet(s) PO QD 2012 

Inactive                                 

 

           Protonix 40 mg Tab RxNorm: 793601 1 Tablet(s) PO QD 2012 10/09/

2012 

Inactive                                 

 

           prednisone 20 mg Tab RxNorm: 495831 1 Tablet(s) PO BID 2012 

Inactive                                 

 

             Flexeril 5 mg Tab RxNorm: 594712 1 Tablet(s) PO QHS for spasm 2012                Inactive                   

 

             Flexeril 5 mg Tab RxNorm: 446730 1 Tablet(s) PO QHS for spasm 2012                Inactive                   

 

                amlodipine 2.5 mg Tab RxNorm: 596178  1/2 Tablet(s) PO QD replac

es 5mg dose 

2012          Inactive             

 

           simvastatin 40 mg tablet RxNorm: 602351 1 Tablet(s) PO QD 2012 

Inactive                                 

 

             metoprolol tartrate 25 mg tablet RxNorm: 419416 1/2 Tablet(s) PO QD

 2012                Inactive                   

 

                omeprazole 20 mg capsule,delayed release RxNorm: 981252  1 Capsu

le(s) PO BID 

2012          Inactive             

 

           cefdinir 300 mg Cap RxNorm: 989245 2 Capsule(s) PO QD 2011 

Inactive                                 

 

           simvastatin 40 mg Tab RxNorm: 553676 1 Tablet(s) PO QD 2011 

Inactive                                 

 

             metoprolol tartrate 25 mg Tab RxNorm: 830883 1/2 Tablet(s) PO QD 10

/   

2012                Inactive                   

 

             metoprolol tartrate 25 mg Tab RxNorm: 129400 1/2 Tablet(s) PO QD 10

/   

10/24/2011                Inactive                   

 

           meclizine 25 mg Tab RxNorm: 0078479 1 Tablet(s) PO QHS 2011 

Inactive                                for dizziness

 

           Ceftin 500 mg Tab RxNorm: 057451 1 Tablet(s) PO BID 2011 

Inactive                                 

 

                omeprazole 20 mg Cap, Delayed Release RxNorm: 522583  1 Capsule(

s) PO BID 

2011          10/24/2011          Inactive             

 

           simvastatin 40 mg Tab RxNorm: 379070 1 Tablet(s) PO QD 2011 

Inactive                                 

 

           simvastatin 40 mg Tab RxNorm: 066721 1 Tablet(s) PO QD 2011 

Inactive                                 

 

           simvastatin 40 mg Tab RxNorm: 710118 1 Tablet(s) PO QD 2010 

Inactive                                 

 

             Tessalon Perles 100 mg Cap RxNorm: 627335 1 Capsule(s) PO Q6-8H 2010                Inactive                   

 

           cefdinir 300 mg Cap RxNorm: 350152 1 Capsule(s) PO BID 2010 

Inactive                                 

 

           Lexapro 10 mg Tab RxNorm: 488691 1 Tablet(s) PO QD 2010

010 

Inactive                                 

 

           Cipro 250 mg Tab RxNorm: 166893 1 Tablet(s) PO BID 2010 10/04/2

010 

Inactive                                 

 

             Wellbutrin  mg 24 hr Tab RxNorm: 779606 1 Tablet(s) PO QAM 2010                Inactive                   

 

          Fish Oil 1,000 mg Cap RxNorm:   1 Capsule(s) PO QD No Start Date      

     Active     

 

                Tylenol Extra Strength 500 mg tablet RxNorm: 939257  1/2 Tablet(

s) PO as needed 

No Start Date                           Active               

 

                nitroglycerin 0.4 mg sublingual tablet RxNorm: 436266  Tablet(s)

 SL as needed No 

Start Date                              Active               

 

                    Calcium with Vitamin D 600 mg (1,500 mg)-400 unit tablet RxN

orm: 401406      1 

Tablet(s) PO QD No Start Date                   Active           

 

           Multivitamin & Mineral Formula Tab RxNorm:    1 Tablet(s) PO QD No St

art Date            

Active                                   

 

          vitamin B complex capsule RxNorm:   1 Capsule(s) PO QD No Start Date  

         Active     

 

          Aspirin 81 mg Tab RxNorm: 554018 1 Tablet(s) PO QD No Start Date      

     Active     

 

                    isosorbide mononitrate ER 30 mg tablet,extended release 24 h

r RxNorm: 104730      1 

Tablet(s) PO QHS No Start Date                   Active           

 

           Vitamin D 1,000 unit Cap RxNorm: 598876 1 Capsule(s) PO QD No Start D

ate            

Active                                   

 

          Co Q-10 oral RxNorm: 42422 oral      No Start Date           Active   

  

 

             metoprolol tartrate 25 mg Tab RxNorm: 732809 1/2 Tablet(s) PO QD No

 Start Date 

10/23/2011                Inactive                   

 

             Nasonex 50 mcg/actuation Spray RxNorm: 5921017 2 Spray NASAL No Sta

rt Date 

2018                Inactive                   

 

           Vitamin B12 1000mcg Tablet RxNorm:    1 Tablet(s) PO QD No Start Date

 2015 

Inactive                                 

 

           amlodipine 5 mg Tab RxNorm: 041542 1 Tablet(s) PO QD No Start Date  

Inactive                                 

 

             simvastatin 40 mg tablet RxNorm: 783940 1 Tablet(s) PO QD No Start 

Date 

2019                Inactive                   

 

                omeprazole 20 mg capsule,delayed release RxNorm: 432511  1 Capsu

le(s) PO BID No 

Start Date          2013          Inactive             

 

                omeprazole 40 mg capsule,delayed release RxNorm: 456755  1 Capsu

le(s) PO QD No 

Start Date          2019          Inactive             

 

           Nexium 40 mg Cap RxNorm: 858410 1 Capsule(s) PO QD No Start Date  

Inactive                                 

 

             Stool Softener 100 mg Tab RxNorm: 3866450 2 Tablet(s) PO BID No Sta

rt Date 

2012                Inactive                   

 

                    Calcium with Vitamin D 600 mg (1,500 mg)-400 unit Tab RxNorm

: 607704      1 Tablet(s)

PO QD           No Start Date   2015      Inactive         

 

             iron 325 mg (65 mg iron) Tab RxNorm: 682272 1 Tablet(s) PO QD No St

art Date 

2015                Inactive                   

 

                Flonase 50 mcg/actuation Nasal Spray RxNorm: 653801  2 [place] ROZ

AL BID No Start 

Date                2014          Inactive             

 

                    fluticasone 50 mcg/actuation nasal spray,suspension RxNorm: 

7691407     2 Spray 

NASAL QD to each nostril No Start Date   2018      Inactive         

 

             Nasonex 50 mcg/actuation Spray RxNorm: 1487318 2 Spray NASAL QD No 

Start Date 

2018                Inactive                   

 

                    hydralazine 50 mg tablet RxNorm: 705149      1 Tablet(s) PO 

as needed for BP over 

160/90          No Start Date   2018      Inactive         

 

             Vimovo 500 mg-20 mg 12 hr Tab RxNorm: 600511 1 Tablet(s) PO BID No 

Start Date 

2011                Inactive                   

 

             Tylenol PM 25 mg-500 mg/15 mL Oral Soln RxNorm: 5113614 1 PO QPM   

  No Start Date 

2015                Inactive                   

 

          Iron (Ferrous Sulfate) Oral RxNorm:   Oral      No Start Date 20

12 Inactive   

 

             Reglan 10 mg Tab RxNorm: 319618 1 Tablet(s) PO TID before meals No 

Start Date 

2011                Inactive                   

 

           Xanax 1 mg Tab RxNorm: 784051 1/2 Tablet(s) PO QD No Start Date  

Inactive                                 

 

             amlodipine 10 mg tablet RxNorm: 075602 1 Tablet(s) PO QD No Start D

ate 

2014                Inactive                   

 

          Iron (dried) Oral RxNorm:   Oral      No Start Date 2012 Inactiv

e   

 

                metoprolol tartrate 50 mg tablet RxNorm: 086293  1 Tablet(s) PO 

BID No Start Date

                    12/10/2018          Inactive             

 

           Xanax 0.25 mg tablet RxNorm: 097692 1 Tablet(s) PO BID No Start Date 

10/29/2018 

Inactive                                 

 

                lancets         RxNorm:         Miscellaneous check blood sugar 

at least once daily No Start 

Date                2013          Inactive             

 

           simvastatin 40 mg Tab RxNorm: 289850 1 Tablet(s) PO QD No Start Date 

2010 

Inactive                                 

 

                    isosorbide mononitrate ER 30 mg tablet,extended release 24 h

r RxNorm: 125593      1 

Tablet(s) PO QHS No Start Date   2019      Inactive         

 

             amlodipine 2.5 mg tablet RxNorm: 627277 1 Tablet(s) PO QD No Start 

Date 

2014                Inactive                   

 

             sucralfate 1 gram tablet RxNorm: 246619 1 Tablet(s) PO QID No Start

 Date 

2019                Inactive                   

 

          Fish Oil Oral RxNorm:   Oral      No Start Date 2012 Inactive   

 

          Multiple Vitamin Oral RxNorm:   Oral      No Start Date 2012 [facility]

ctive   

 

                metoprolol tartrate 25 mg tablet RxNorm: 554834  1/2 Tablet(s) P

O QD No Start 

Date                2017          Inactive             

 

                metoprolol tartrate 50 mg tablet RxNorm: 277011  1/2 Tablet(s) P

O BID No Start 

Date                2019          Inactive             

 

                    Flonase Allergy Relief 50 mcg/actuation nasal spray,suspensi

on RxNorm: 8988126     2

[place] NASAL QHS No Start Date   2019      Inactive         







Medication Administered

No Medication Administered data



Immunizations





                Vaccine         Codes           Date            Status

 

                Influenza       CVX: 135        10/22/2019      Complete

 

                Influenza       CVX: 135        10/22/2019      Complete

 

                Influenza       CVX: 135        2018      Complete

 

                Influenza       CVX: 135        10/06/2017      Complete

 

                Influenza       CVX: 135        10/07/2016      Complete

 

                Influenza       CVX: 135        10/16/2015       

 

                Influenza       CVX: 141        2013       

 

                Influenza (Adult) CVX: 141        10/06/2010       







Results





          Observation Observation Code Item      Item Code Result    Date      S

[facility] Location

 

          COMPLETE BLOOD COUNT [id number]   WBC                 7.1 10e9/L 20

20 Unknown

 

          COMPLETE BLOOD COUNT [id number]   RBC                 4.16 10e12/L 2020 Unknown

 

          COMPLETE BLOOD COUNT 5568724   HEMOGLOBIN           12.4 g/dL 20

20 Unknown

 

          COMPLETE BLOOD COUNT [id number]   HEMATOCRIT           39.3 %    20

20 Unknown

 

          COMPLETE BLOOD COUNT 0188722   MCV                 94.5 fL   

0 Unknown

 

          COMPLETE BLOOD COUNT [id number]   MCH                 29.8 pg   

0 Unknown

 

          COMPLETE BLOOD COUNT [id number]   MCHC                31.6 g/dL 

0 Unknown

 

          COMPLETE BLOOD COUNT 7860002   PLATELET COUNT           161 10e9/L 2020 Unknown

 

          COMPLETE BLOOD COUNT [id number]   Mean Plt Volume           10.8 fL   2020 Unknown

 

          COMPLETE BLOOD COUNT 0783138   Neut Auto           38.7 %    

0 Unknown

 

          COMPLETE BLOOD COUNT [id number]   Lymph Auto           48.0 %    20

20 Unknown

 

          COMPLETE BLOOD COUNT [id number]   Mono Auto           9.5 %     

0 Unknown

 

          COMPLETE BLOOD COUNT [id number]   RDW                 13.5 %    

0 Unknown

 

          COMPLETE BLOOD COUNT [id number]   Eos Auto            3.2 %     

0 Unknown

 

          COMPLETE BLOOD COUNT [id number]   Baso Auto           0.6 %     

0 Unknown

 

          COMPLETE BLOOD COUNT [id number]   Neutrophil Abs           2.75 10e9/L  Unknown

 

          COMPLETE BLOOD COUNT [id number]   Lymphocyte Abs           3.41 10e9/L  Unknown

 

          COMPLETE BLOOD COUNT [id number]   Monocyte Abs           0.67 10e9/L 2020 Unknown

 

          COMPLETE BLOOD COUNT [id number]   Eosinophil Abs           0.23 10e9/L  Unknown

 

          COMPLETE BLOOD COUNT [id number]   RDW-SD              44.7 fL   

0 Unknown

 

          COMPLETE BLOOD COUNT [id number]   Basophil Abs           0.04 10e9/L 2020 Unknown

 

          THYROID STIMULATING HORMONE 87804     TSH                 1.677 uIU/mL

 2020 Unknown

 

          COMPREHENSIVE METABOLIC 67556     AST                 19 U/L    2020 Unknown

 

          COMPREHENSIVE METABOLIC 97828     ALT                 12 U/L    2020 Unknown

 

          COMPREHENSIVE METABOLIC 28036     BUN                 17 mg/dL  2020 Unknown

 

          COMPREHENSIVE METABOLIC 80223     ALBUMIN             4.2 g/dL  2020 Unknown

 

          COMPREHENSIVE METABOLIC 99751     CHLORIDE            103 mmol/L  Unknown

 

          COMPREHENSIVE METABOLIC 84222     Bili Total           0.2 mg/dL  Unknown

 

          COMPREHENSIVE METABOLIC 40868     ALK PHOS            61 U/L    2020 Unknown

 

          COMPREHENSIVE METABOLIC 07432     SODIUM              140 mmol/L  Unknown

 

          COMPREHENSIVE METABOLIC 78248     CREATININE           0.95 mg/dL 2020 Unknown

 

          COMPREHENSIVE METABOLIC 19133     CALCIUM             9.4 mg/dL 2020 Unknown

 

          COMPREHENSIVE METABOLIC 67023     POTASSIUM           4.2 mmol/L  Unknown

 

          COMPREHENSIVE METABOLIC 04277     Total Protein           6.5 g/dL   Unknown

 

          COMPREHENSIVE METABOLIC 17282     Glucose             103 mg/dL 2020 Unknown

 

          COMPREHENSIVE METABOLIC 50748     Bicarbonate           26 mmol/L 2020 Unknown

 

          COMPREHENSIVE METABOLIC 70377     AGAP                11 mmol/L 2020 Unknown

 

          GFR CALC  2156337   GFR Non Afr Amr           57 mL/min 2020 Unk

nown

 

          GFR CALC  3437883   GFR Afr Amr           >60 mL/min 2020 Unknow

n

 

          GFR CALC  0291598   GFR Non Afr Amr           52 mL/min 10/11/2019 Unk

nown

 

          GFR CALC  3938497   GFR Afr Amr           >60 mL/min 10/11/2019 Unknow

n

 

          COMPREHENSIVE METABOLIC 69729     AST                 17 U/L    10/11/

2019 Unknown

 

          COMPREHENSIVE METABOLIC 27739     ALT                 11 U/L    10/11/

2019 Unknown

 

          COMPREHENSIVE METABOLIC 27476     BUN                 17 mg/dL  10/11/

2019 Unknown

 

          COMPREHENSIVE METABOLIC 42171     ALBUMIN             3.9 g/dL  10/11/

2019 Unknown

 

          COMPREHENSIVE METABOLIC 41607     CHLORIDE            108 mmol/L 10/11

/2019 Unknown

 

          COMPREHENSIVE METABOLIC 58285     Bili Total           0.5 mg/dL 10/11

/2019 Unknown

 

          COMPREHENSIVE METABOLIC 31599     ALK PHOS            72 U/L    10/11/

2019 Unknown

 

          COMPREHENSIVE METABOLIC 15666     SODIUM              142 mmol/L 10/11

/2019 Unknown

 

          COMPREHENSIVE METABOLIC 21630     CREATININE           1.02 mg/dL 10/1

2019 Unknown

 

          COMPREHENSIVE METABOLIC 79419     CALCIUM             9.3 mg/dL 10/11/

2019 Unknown

 

          COMPREHENSIVE METABOLIC 06400     POTASSIUM           4.0 mmol/L 10/11

/2019 Unknown

 

          COMPREHENSIVE METABOLIC 33543     Total Protein           6.2 g/dL  10

/2019 Unknown

 

          COMPREHENSIVE METABOLIC 00606     Glucose             93 mg/dL  10/11/

2019 Unknown

 

          COMPREHENSIVE METABOLIC 50191     Bicarbonate           27 mmol/L 10/1

2019 Unknown

 

          COMPREHENSIVE METABOLIC 03549     AGAP                7 mmol/L  10/11/

2019 Unknown

 

          GFR CALC  3715610   GFR Non Afr Amr           48 mL/min 06/10/2019 Unk

nown

 

          GFR CALC  5513843   GFR Afr Amr           59 mL/min 06/10/2019 Unknown

 

          THYROID STIMULATING HORMONE 70027     TSH                 1.936 uIU/mL

 06/10/2019 Unknown

 

          COMPREHENSIVE METABOLIC 55739     AST                 18 U/L    06/10/

2019 Unknown

 

          COMPREHENSIVE METABOLIC 46900     ALT                 11 U/L    06/10/

2019 Unknown

 

          COMPREHENSIVE METABOLIC 30074     BUN                 18 mg/dL  06/10/

2019 Unknown

 

          COMPREHENSIVE METABOLIC 13804     ALBUMIN             4.2 g/dL  06/10/

2019 Unknown

 

          COMPREHENSIVE METABOLIC 87833     CHLORIDE            109 mmol/L 06/10

/2019 Unknown

 

          COMPREHENSIVE METABOLIC 80051     Bili Total           0.4 mg/dL 06/10

/2019 Unknown

 

          COMPREHENSIVE METABOLIC 03949     ALK PHOS            64 U/L    06/10/

2019 Unknown

 

          COMPREHENSIVE METABOLIC 04913     SODIUM              142 mmol/L 06/10

/2019 Unknown

 

          COMPREHENSIVE METABOLIC 03482     CREATININE           1.09 mg/dL  Unknown

 

          COMPREHENSIVE METABOLIC 13839     CALCIUM             9.3 mg/dL 06/10/

2019 Unknown

 

          COMPREHENSIVE METABOLIC 73680     POTASSIUM           4.7 mmol/L 06/10

/2019 Unknown

 

          COMPREHENSIVE METABOLIC 99169     Total Protein           6.3 g/dL  06

/10/2019 Unknown

 

          COMPREHENSIVE METABOLIC 74725     Glucose             84 mg/dL  06/10/

2019 Unknown

 

          COMPREHENSIVE METABOLIC 32804     Bicarbonate           25 mmol/L  Unknown

 

          COMPREHENSIVE METABOLIC 43861     AGAP                8 mmol/L  06/10/

2019 Unknown

 

          COMPLETE BLOOD COUNT [id number]   WBC                 7.8 10e9/L 06/10/20

19 Unknown

 

          COMPLETE BLOOD COUNT [id number]   RBC                 4.04 10e12/L 06/10/

2019 Unknown

 

          COMPLETE BLOOD COUNT [id number]   HEMOGLOBIN           12.2 g/dL 06/10/20

19 Unknown

 

          COMPLETE BLOOD COUNT [id number]   HEMATOCRIT           38.6 %    06/10/20

19 Unknown

 

          COMPLETE BLOOD COUNT [id number]   MCV                 95.5 fL   06/10/201

9 Unknown

 

          COMPLETE BLOOD COUNT [id number]   MCH                 30.2 pg   06/10/201

9 Unknown

 

          COMPLETE BLOOD COUNT [id number]   MCHC                31.6 g/dL 06/10/201

9 Unknown

 

          COMPLETE BLOOD COUNT [id number]   PLATELET COUNT           215 10e9/L 06/

10/2019 Unknown

 

          COMPLETE BLOOD COUNT [id number]   Mean Plt Volume           11.2 fL   06/

10/2019 Unknown

 

          COMPLETE BLOOD COUNT [id number]   Neut Auto           45.7 %    06/10/201

9 Unknown

 

          COMPLETE BLOOD COUNT [id number]   Lymph Auto           42.1 %    06/10/20

19 Unknown

 

          COMPLETE BLOOD COUNT [id number]   Mono Auto           9.2 %     06/10/201

9 Unknown

 

          COMPLETE BLOOD COUNT [id number]   RDW                 13.4 %    06/10/201

9 Unknown

 

          COMPLETE BLOOD COUNT [id number]   Eos Auto            2.7 %     06/10/201

9 Unknown

 

          COMPLETE BLOOD COUNT [id number]   Baso Auto           0.3 %     06/10/201

9 Unknown

 

          COMPLETE BLOOD COUNT [id number]   Neutrophil Abs           3.56 10e9/L 06

/10/2019 Unknown

 

          COMPLETE BLOOD COUNT [id number]   Lymphocyte Abs           3.28 10e9/L 06

/10/2019 Unknown

 

          COMPLETE BLOOD COUNT [id number]   Monocyte Abs           0.72 10e9/L  Unknown

 

          COMPLETE BLOOD COUNT [id number]   Eosinophil Abs           0.21 10e9/L 06

/10/2019 Unknown

 

          COMPLETE BLOOD COUNT [id number]   RDW-SD              44.9 fL   06/10/201

9 Unknown

 

          COMPLETE BLOOD COUNT [id number]   Basophil Abs           0.02 10e9/L  Unknown

 

          COMPLETE BLOOD COUNT [id number]   WBC                 5.2 10e9/L 20

18 Unknown

 

          COMPLETE BLOOD COUNT [id number]   RBC                 4.21 10e12/L 2018 Unknown

 

          COMPLETE BLOOD COUNT [id number]   HEMOGLOBIN           12.8 g/dL 20

18 Unknown

 

          COMPLETE BLOOD COUNT [id number]   HEMATOCRIT           39.0 %    20

18 Unknown

 

          COMPLETE BLOOD COUNT [id number]   MCV                 92.6 fL   

8 Unknown

 

          COMPLETE BLOOD COUNT [id number]   MCH                 30.4 pg   

8 Unknown

 

          COMPLETE BLOOD COUNT [id number]   MCHC                32.8 g/dL 

8 Unknown

 

          COMPLETE BLOOD COUNT [id number]   PLATELET COUNT           202 10e9/L  Unknown

 

          COMPLETE BLOOD COUNT [id number]   Mean Plt Volume           10.7 fL    Unknown

 

          COMPLETE BLOOD COUNT [id number]   Neut Auto           40.9 %    

8 Unknown

 

          COMPLETE BLOOD COUNT [id number]   Lymph Auto           46.3 %    20

18 Unknown

 

          COMPLETE BLOOD COUNT [id number]   Mono Auto           9.3 %     

8 Unknown

 

          COMPLETE BLOOD COUNT [id number]   RDW                 13.7 %    

8 Unknown

 

          COMPLETE BLOOD COUNT [id number]   Eos Auto            2.9 %     

8 Unknown

 

          COMPLETE BLOOD COUNT [id number]   Baso Auto           0.6 %     

8 Unknown

 

          COMPLETE BLOOD COUNT [id number]   Neutrophil Abs           2.13 10e9/L  Unknown

 

          COMPLETE BLOOD COUNT [id number]   Lymphocyte Abs           2.41 10e9/L  Unknown

 

          COMPLETE BLOOD COUNT [id number]   Monocyte Abs           0.48 10e9/L  Unknown

 

          COMPLETE BLOOD COUNT [id number]   Eosinophil Abs           0.15 10e9/L  Unknown

 

          COMPLETE BLOOD COUNT [id number]   RDW-SD              45.2 fL   

8 Unknown

 

          COMPLETE BLOOD COUNT [id number]   Basophil Abs           0.03 10e9/L  Unknown

 

          [place] [address]     Glucose             118 mg/dL  Unknown

 

          [place] [address]     CREATININE           1.01 mg/dL  Unknown

 

          [place] [address]     BUN                 14 mg/dL   Unknown

 

          [place] [address]     SODIUM              141 mmol/L  Unknown

 

          [place] [address]     POTASSIUM           4.0 mmol/L  Unknown

 

          [place] [address]     CHLORIDE            108 mmol/L  Unknown

 

          [place] [address]     Bicarbonate           25 mmol/L  Unknown

 

          [place] [address]     AGAP                8 mmol/L   Unknown

 

          [place] [address]     CALCIUM             9.6 mg/dL  Unknown

 

          FREE T4   45664     T4 Free             1.23 ng/dL 2018 Unknown

 

          GFR CALC  5595638   GFR Non Afr Amr           53 mL/min 2018 Unk

nown

 

          GFR CALC  9882221   GFR Afr Amr           >60 mL/min 2018 Unknow

n

 

          THYROID STIMULATING HORMONE 11359     TSH                 2.124 uIU/mL

 2018 Unknown

 

          LIPID GROUP [id number]     Cholesterol           152 mg/dL 2018 Unkno

wn

 

          LIPID GROUP 11872     Triglyceride           151 mg/dL 2018 Unkn

own

 

          LIPID GROUP 88669     HDL CHOLESTEROL           47 mg/dL  2018 U

nknown

 

          LIPID GROUP 88693     Chol/HDL Ratio           3.23 ratio 2018 U

nknown

 

          LIPID GROUP 85693     NON-HDL Chol           105 mg/dL 2018 Unkn

own

 

          LIPID GROUP 69249     LDL Cholesterol           75 mg/dL  2018 U

nknown

 

          ASSAY OF TROPONIN QUANT 82030     Troponin-I           <0.30 ng/mL  Unknown

 

          COMPREHENSIVE METABOLIC 44467     AST                 20 U/L    2018 Unknown

 

          COMPREHENSIVE METABOLIC 59868     ALT                 14 U/L    2018 Unknown

 

          COMPREHENSIVE METABOLIC 37076     BUN                 19 mg/dL  2018 Unknown

 

          COMPREHENSIVE METABOLIC 64677     ALBUMIN             4.2 g/dL  2018 Unknown

 

          COMPREHENSIVE METABOLIC 78416     CHLORIDE            102 mmol/L  Unknown

 

          COMPREHENSIVE METABOLIC 66863     Bili Total           0.4 mg/dL  Unknown

 

          COMPREHENSIVE METABOLIC 31423     ALK PHOS            66 U/L    2018 Unknown

 

          COMPREHENSIVE METABOLIC 14012     SODIUM              135 mmol/L  Unknown

 

          COMPREHENSIVE METABOLIC 86525     CREATININE           1.01 mg/dL  Unknown

 

          COMPREHENSIVE METABOLIC 76905     CALCIUM             9.3 mg/dL 2018 Unknown

 

          COMPREHENSIVE METABOLIC 10754     POTASSIUM           4.8 mmol/L  Unknown

 

          COMPREHENSIVE METABOLIC 42199     Total Protein           7.0 g/dL   Unknown

 

          COMPREHENSIVE METABOLIC 08507     Glucose             91 mg/dL  2018 Unknown

 

          COMPREHENSIVE METABOLIC 44041     Bicarbonate           23 mmol/L  Unknown

 

          COMPREHENSIVE METABOLIC 98336     AGAP                10 mmol/L 2018 Unknown

 

          COMPLETE BLOOD COUNT [id number]   WBC                 7.5 10e9/L 20

18 Unknown

 

          COMPLETE BLOOD COUNT [id number]   RBC                 4.13 10e12/L 2018 Unknown

 

          COMPLETE BLOOD COUNT [id number]   HEMOGLOBIN           12.6 g/dL 20

18 Unknown

 

          COMPLETE BLOOD COUNT [id number]   HEMATOCRIT           38.4 %    20

18 Unknown

 

          COMPLETE BLOOD COUNT [id number]   MCV                 93.0 fL   

8 Unknown

 

          COMPLETE BLOOD COUNT [id number]   MCH                 30.5 pg   

8 Unknown

 

          COMPLETE BLOOD COUNT [id number]   MCHC                32.8 g/dL 

8 Unknown

 

          COMPLETE BLOOD COUNT [id number]   PLATELET COUNT           204 10e9/L  Unknown

 

          COMPLETE BLOOD COUNT [id number]   Mean Plt Volume           10.9 fL    Unknown

 

          COMPLETE BLOOD COUNT [id number]   Neut Auto           43.1 %    

8 Unknown

 

          COMPLETE BLOOD COUNT [id number]   Lymph Auto           45.0 %    20

18 Unknown

 

          COMPLETE BLOOD COUNT [id number]   Mono Auto           9.2 %     

8 Unknown

 

          COMPLETE BLOOD COUNT [id number]   RDW                 13.4 %    

8 Unknown

 

          COMPLETE BLOOD COUNT [id number]   Eos Auto            2.3 %     

8 Unknown

 

          COMPLETE BLOOD COUNT [id number]   Baso Auto           0.4 %     

8 Unknown

 

          COMPLETE BLOOD COUNT [id number]   Neutrophil Abs           3.23 10e9/L  Unknown

 

          COMPLETE BLOOD COUNT [id number]   Lymphocyte Abs           3.38 10e9/L  Unknown

 

          COMPLETE BLOOD COUNT [id number]   Monocyte Abs           0.69 10e9/L  Unknown

 

          COMPLETE BLOOD COUNT [id number]   Eosinophil Abs           0.17 10e9/L  Unknown

 

          COMPLETE BLOOD COUNT [id number]   RDW-SD              44.4 fL   

8 Unknown

 

          COMPLETE BLOOD COUNT [id number]   Basophil Abs           0.03 10e9/L  Unknown

 

          GFR CALC  2434134   GFR Non Afr Amr           53 mL/min 2018 Unk

nown

 

          GFR CALC  5286107   GFR Afr Amr           >60 mL/min 2018 Unknow

n

 

          GLYCOSYLATED HEMOGLOBIN TEST 09511     Hgb A1c   03998-0   5.4 %     0

2018 Unknown

 

          MEAN GLUC 5786026   Calc Mean Gluc           108 mg/dL 2018 Unkn

own

 

          MEAN GLUC 8186684   Calc Mean Gluc           114 mg/dL 2017 Unkn

own

 

          LIPID GROUP 62327     Cholesterol           146 mg/dL 2017 Unkno

wn

 

          LIPID GROUP 94861     Triglyceride           119 mg/dL 2017 Unkn

own

 

          LIPID GROUP 57539     HDL CHOLESTEROL           47 mg/dL  2017 U

nknown

 

          LIPID GROUP 91355     Chol/HDL Ratio           3.11 ratio 2017 U

nknown

 

          LIPID GROUP 34864     NON-HDL Chol           99 mg/dL  2017 Unkn

own

 

          LIPID GROUP 13679     LDL Cholesterol           75 mg/dL  2017 U

nknown

 

          GLYCOSYLATED HEMOGLOBIN TEST 98977     Hgb A1c   47197-8   5.6 %     0

2017 Unknown

 

          COMPREHENSIVE METABOLIC 32731     AST                 22 U/L    2017 Unknown

 

          COMPREHENSIVE METABOLIC 34138     ALT                 12 U/L    2017 Unknown

 

          COMPREHENSIVE METABOLIC 06443     BUN                 17 mg/dL  2017 Unknown

 

          COMPREHENSIVE METABOLIC 93095     ALBUMIN             4.0 g/dL  2017 Unknown

 

          COMPREHENSIVE METABOLIC 39277     CHLORIDE            110 mmol/L  Unknown

 

          COMPREHENSIVE METABOLIC 80632     Bili Total           0.4 mg/dL  Unknown

 

          COMPREHENSIVE METABOLIC 63287     ALK PHOS            63 U/L    2017 Unknown

 

          COMPREHENSIVE METABOLIC 21078     SODIUM              140 mmol/L  Unknown

 

          COMPREHENSIVE METABOLIC 80042     CREATININE           1.05 mg/dL  Unknown

 

          COMPREHENSIVE METABOLIC 46627     CALCIUM             9.2 mg/dL 2017 Unknown

 

          COMPREHENSIVE METABOLIC 65602     POTASSIUM           4.2 mmol/L  Unknown

 

          COMPREHENSIVE METABOLIC 28210     Total Protein           6.2 g/dL   Unknown

 

          COMPREHENSIVE METABOLIC 80291     Glucose             87 mg/dL  2017 Unknown

 

          COMPREHENSIVE METABOLIC 98973     Bicarbonate           24 mmol/L  Unknown

 

          COMPREHENSIVE METABOLIC 62337     AGAP                6 mmol/L  2017 Unknown

 

          GFR CALC  0882503   GFR Non Afr Amr           51 mL/min 2017 Unk

nown

 

          GFR CALC  3108446   GFR Afr Amr           >60 mL/min 2017 Unknow

n

 

          COMPLETE BLOOD COUNT [id number]   WBC                 6.7 10e9/L 20

17 Unknown

 

          COMPLETE BLOOD COUNT [id number]   RBC                 4.04 10e12/L 2017 Unknown

 

          COMPLETE BLOOD COUNT [id number]   HEMOGLOBIN           12.1 g/dL 20

17 Unknown

 

          COMPLETE BLOOD COUNT [id number]   HEMATOCRIT           38.0 %    20

17 Unknown

 

          COMPLETE BLOOD COUNT [id number]   MCV                 94.1 fL   

7 Unknown

 

          COMPLETE BLOOD COUNT [id number]   MCH                 30.0 pg   

7 Unknown

 

          COMPLETE BLOOD COUNT [id number]   MCHC                31.8 g/dL 

7 Unknown

 

          COMPLETE BLOOD COUNT [id number]   PLATELET COUNT           206 10e9/L  Unknown

 

          COMPLETE BLOOD COUNT [id number]   Mean Plt Volume           11.3 fL    Unknown

 

          COMPLETE BLOOD COUNT [id number]   Neut Auto           35.8 %    

7 Unknown

 

          COMPLETE BLOOD COUNT [id number]   Lymph Auto           51.6 %    20

17 Unknown

 

          COMPLETE BLOOD COUNT [id number]   Mono Auto           8.8 %     

7 Unknown

 

          COMPLETE BLOOD COUNT [id number]   RDW                 13.5 %    

7 Unknown

 

          COMPLETE BLOOD COUNT [id number]   Eos Auto            3.4 %     

7 Unknown

 

          COMPLETE BLOOD COUNT [id number]   Baso Auto           0.4 %     

7 Unknown

 

          COMPLETE BLOOD COUNT [id number]   Neutrophil Abs           2.40 10e9/L  Unknown

 

          COMPLETE BLOOD COUNT [id number]   Lymphocyte Abs           3.46 10e9/L  Unknown

 

          COMPLETE BLOOD COUNT [id number]   Monocyte Abs           0.59 10e9/L  Unknown

 

          COMPLETE BLOOD COUNT [id number]   Eosinophil Abs           0.23 10e9/L  Unknown

 

          COMPLETE BLOOD COUNT [id number]   RDW-SD              45.3 fL   

7 Unknown

 

          COMPLETE BLOOD COUNT [id number]   Basophil Abs           0.03 10e9/L  Unknown

 

          THYROID STIMULATING HORMONE 85449     TSH                 1.981 uIU/mL

 2017 Unknown

 

          COMPLETE BLOOD COUNT [id number]   WBC                 6.0 10e9/L 20

17 Unknown

 

          COMPLETE BLOOD COUNT [id number]   RBC                 4.29 10e12/L 2017 Unknown

 

          COMPLETE BLOOD COUNT [id number]   HEMOGLOBIN           12.9 g/dL 20

17 Unknown

 

          COMPLETE BLOOD COUNT [id number]   HEMATOCRIT           38.4 %    20

17 Unknown

 

          COMPLETE BLOOD COUNT [id number]   MCV                 89.5 fL   

7 Unknown

 

          COMPLETE BLOOD COUNT [id number]   MCH                 30.1 pg   

7 Unknown

 

          COMPLETE BLOOD COUNT [id number]   MCHC                33.6 g/dL 

7 Unknown

 

          COMPLETE BLOOD COUNT [id number]   PLATELET COUNT           181 10e9/L 2017 Unknown

 

          COMPLETE BLOOD COUNT [id number]   Mean Plt Volume           11.7 fL   2017 Unknown

 

          COMPLETE BLOOD COUNT [id number]   Neut Auto           36.9 %    

7 Unknown

 

          COMPLETE BLOOD COUNT [id number]   Lymph Auto           50.4 %    20

17 Unknown

 

          COMPLETE BLOOD COUNT [id number]   Mono Auto           9.0 %     

7 Unknown

 

          COMPLETE BLOOD COUNT [id number]   RDW                 13.7 %    

7 Unknown

 

          COMPLETE BLOOD COUNT [id number]   Eos Auto            3.4 %     

7 Unknown

 

          COMPLETE BLOOD COUNT [id number]   Baso Auto           0.3 %     

7 Unknown

 

          COMPLETE BLOOD COUNT [id number]   Neutrophil Abs           2.21 10e9/L  Unknown

 

          COMPLETE BLOOD COUNT [id number]   Lymphocyte Abs           3.02 10e9/L  Unknown

 

          COMPLETE BLOOD COUNT [id number]   Monocyte Abs           0.54 10e9/L 2017 Unknown

 

          COMPLETE BLOOD COUNT [id number]   Eosinophil Abs           0.20 10e9/L  Unknown

 

          COMPLETE BLOOD COUNT [id number]   RDW-SD              44.0 fL   

7 Unknown

 

          COMPLETE BLOOD COUNT [id number]   Basophil Abs           0.02 10e9/L 2017 Unknown

 

          GLYCOSYLATED HEMOGLOBIN TEST 94348     Hgb A1c   69377-7   5.4 %     0

3/09/2017 Unknown

 

          THYROID STIMULATING HORMONE 16421     TSH                 2.200 uIU/mL

 2017 Unknown

 

          GFR CALC  5478495   GFR Non Afr Amr           50 mL/min 2017 Unk

nown

 

          GFR CALC  2906620   GFR Afr Amr           >60 mL/min 2017 Unknow

n

 

          MEAN GLUC 4740790   Calc Mean Gluc           108 mg/dL 2017 Unkn

own

 

          COMPREHENSIVE METABOLIC 65692     AST                 18 U/L    2017 Unknown

 

          COMPREHENSIVE METABOLIC 53487     ALT                 10 U/L    2017 Unknown

 

          COMPREHENSIVE METABOLIC 86725     BUN                 20 mg/dL  2017 Unknown

 

          COMPREHENSIVE METABOLIC 53888     ALBUMIN             4.1 g/dL  2017 Unknown

 

          COMPREHENSIVE METABOLIC 49171     CHLORIDE            109 mmol/L  Unknown

 

          COMPREHENSIVE METABOLIC 26709     Bili Total           0.6 mg/dL  Unknown

 

          COMPREHENSIVE METABOLIC 91251     ALK PHOS            64 U/L    2017 Unknown

 

          COMPREHENSIVE METABOLIC 35345     SODIUM              141 mmol/L  Unknown

 

          COMPREHENSIVE METABOLIC 50751     CREATININE           1.06 mg/dL 2017 Unknown

 

          COMPREHENSIVE METABOLIC 65091     CALCIUM             9.9 mg/dL 2017 Unknown

 

          COMPREHENSIVE METABOLIC 82728     POTASSIUM           4.2 mmol/L  Unknown

 

          COMPREHENSIVE METABOLIC 42665     Total Protein           6.3 g/dL   Unknown

 

          COMPREHENSIVE METABOLIC 24627     Glucose             99 mg/dL  2017 Unknown

 

          COMPREHENSIVE METABOLIC 37682     Bicarbonate           21 mmol/L 2017 Unknown

 

          COMPREHENSIVE METABOLIC 50058     AGAP                11 mmol/L 2017 Unknown

 

          LIPID GROUP [id number]     Cholesterol           169 mg/dL 2016 Unkno

wn

 

          LIPID GROUP [id number]     Triglyceride           165 mg/dL 2016 Unkn

own

 

          LIPID GROUP [id number]     HDL CHOLESTEROL           43 mg/dL  2016 U

nknown

 

          LIPID GROUP [id number]     Chol/HDL Ratio           3.93 ratio 2016 U

nknown

 

          LIPID GROUP [id number]     NON-HDL Chol           126 mg/dL 2016 Unkn

own

 

          LIPID GROUP 11865     LDL Cholesterol           93 mg/dL  2016 U

nknown

 

          COMPREHENSIVE METABOLIC 07522     AST                 18 U/L    2016 Unknown

 

          COMPREHENSIVE METABOLIC 87211     ALT                 10 U/L    2016 Unknown

 

          COMPREHENSIVE METABOLIC 86132     BUN                 20 mg/dL  2016 Unknown

 

          COMPREHENSIVE METABOLIC 21054     ALBUMIN             3.9 g/dL  2016 Unknown

 

          COMPREHENSIVE METABOLIC 07679     CHLORIDE            110 mmol/L  Unknown

 

          COMPREHENSIVE METABOLIC 62549     Bili Total           0.5 mg/dL  Unknown

 

          COMPREHENSIVE METABOLIC 86389     ALK PHOS            72 U/L    2016 Unknown

 

          COMPREHENSIVE METABOLIC 46729     SODIUM              141 mmol/L  Unknown

 

          COMPREHENSIVE METABOLIC 18696     CREATININE           1.12 mg/dL  Unknown

 

          COMPREHENSIVE METABOLIC 47341     CALCIUM             9.7 mg/dL 2016 Unknown

 

          COMPREHENSIVE METABOLIC 19619     POTASSIUM           4.4 mmol/L  Unknown

 

          COMPREHENSIVE METABOLIC 14729     Total Protein           6.2 g/dL   Unknown

 

          COMPREHENSIVE METABOLIC 37403     Glucose             90 mg/dL  2016 Unknown

 

          COMPREHENSIVE METABOLIC 78383     Bicarbonate           23 mmol/L  Unknown

 

          COMPREHENSIVE METABOLIC 77231     AGAP                8 mmol/L  2016 Unknown

 

          GFR CALC  4464465   GFR Non Afr Amr           47 mL/min 2016 Unk

nown

 

          GFR CALC  1250121   GFR Afr Amr           57 mL/min 2016 Unknown

 

          GLYCOSYLATED HEMOGLOBIN TEST 21040     Hgb A1c   48234-6   5.5 %     0

2016 Unknown

 

          THYROID STIMULATING HORMONE 92692     TSH                 2.537 uIU/mL

 2016 Unknown

 

          FREE T4   11800     T4 Free             1.36 ng/dL 2016 Unknown

 

          COMPLETE BLOOD COUNT [id number]   WBC                 6.8 10e9/L 20

16 Unknown

 

          COMPLETE BLOOD COUNT [id number]   RBC                 4.20 10e12/L 2016 Unknown

 

          COMPLETE BLOOD COUNT 0770710   HEMOGLOBIN           12.5 g/dL 20

16 Unknown

 

          COMPLETE BLOOD COUNT 2389992   HEMATOCRIT           38.0 %    20

16 Unknown

 

          COMPLETE BLOOD COUNT 8554927   MCV                 90.5 fL   

6 Unknown

 

          COMPLETE BLOOD COUNT 6487988   MCH                 29.8 pg   

6 Unknown

 

          COMPLETE BLOOD COUNT 9057531   MCHC                32.9 g/dL 

6 Unknown

 

          COMPLETE BLOOD COUNT 8091552   PLATELET COUNT           197 10e9/L  Unknown

 

          COMPLETE BLOOD COUNT [id number]   Mean Plt Volume           11.7 fL    Unknown

 

          COMPLETE BLOOD COUNT [id number]   Neut Auto           41.3 %    

6 Unknown

 

          COMPLETE BLOOD COUNT 8390343   Lymph Auto           47.1 %    20

16 Unknown

 

          COMPLETE BLOOD COUNT 0850134   Mono Auto           7.8 %     

6 Unknown

 

          COMPLETE BLOOD COUNT 7874056   RDW                 13.8 %    

6 Unknown

 

          COMPLETE BLOOD COUNT [id number]   Eos Auto            3.4 %     

6 Unknown

 

          COMPLETE BLOOD COUNT 9881360   Baso Auto           0.4 %     

6 Unknown

 

          COMPLETE BLOOD COUNT [id number]   Neutrophil Abs           2.81 10e9/L  Unknown

 

          COMPLETE BLOOD COUNT 9776551   Lymphocyte Abs           3.20 10e9/L  Unknown

 

          COMPLETE BLOOD COUNT 6108082   Monocyte Abs           0.53 10e9/L  Unknown

 

          COMPLETE BLOOD COUNT 7108040   Eosinophil Abs           0.23 10e9/L  Unknown

 

          COMPLETE BLOOD COUNT [id number]   RDW-SD              44.4 fL   

6 Unknown

 

          COMPLETE BLOOD COUNT 8031431   Basophil Abs           0.03 10e9/L  Unknown

 

          MEAN GLUC 3605085   Calc Mean Gluc           111 mg/dL 2016 Unkn

own

 

          [place] [address]     Glucose             89 MG/DL   Unknown

 

          [place] [address]     CREATININE           1.12 MG/DL  Unknown

 

          [place] [address]     BUN                 20 MG/DL   Unknown

 

          [place] [address]     SODIUM              139 MMOL/L  Unknown

 

          [place] [address]     POTASSIUM           4.6 MMOL/L  Unknown

 

          [place] [address]     CHLORIDE            108 MMOL/L  Unknown

 

          [place] [address]     BICARB              26 MMOL/L  Unknown

 

          [place] [address]     ANION GAP           5 MEQ/L    Unknown

 

          [place] [address]     CALCIUM             10.0 MG/DL  Unknown

 

          GFR CALC  1375364   GFR AA              57.0L ML/MIN 2015 Unknow

n

 

          GFR CALC  2279717   GFR NON-AA           47.0L ML/MIN 2015 Unkno

wn

 

          THYROID STIMULATING HORMONE 19618     TSH                 2.378 uIU/ML

 09/10/2015 Unknown

 

          COMPLETE BLOOD COUNT [id number]   WBC                 6.4 10e9/L 09/10/20

15 Unknown

 

          COMPLETE BLOOD COUNT [id number]   RBC                 3.99 10e12/L 09/10/

2015 Unknown

 

          COMPLETE BLOOD COUNT [id number]   HGB                 11.9 g/dL 09/10/201

5 Unknown

 

          COMPLETE BLOOD COUNT [id number]   HCT DET             36.9 %    09/10/201

5 Unknown

 

          COMPLETE BLOOD COUNT [id number]   MCV                 92.5 fL   09/10/201

5 Unknown

 

          COMPLETE BLOOD COUNT [id number]   MCH                 29.8 pg   09/10/201

5 Unknown

 

          COMPLETE BLOOD COUNT [id number]   MCHC                32.2 g/dL 09/10/201

5 Unknown

 

          COMPLETE BLOOD COUNT [id number]   PLT                 172 10e9/L 09/10/20

15 Unknown

 

          COMPLETE BLOOD COUNT [id number]   MPV                 11.7 fL   09/10/201

5 Unknown

 

          COMPLETE BLOOD COUNT [id number]   ARIK %               40.4 %    09/10/201

5 Unknown

 

          COMPLETE BLOOD COUNT 9340002   LY %                48.0 %    09/10/201

5 Unknown

 

          COMPLETE BLOOD COUNT [id number]   MON %               8.3 %     09/10/201

5 Unknown

 

          COMPLETE BLOOD COUNT [id number]   EOS  %              2.8 %     09/10/201

5 Unknown

 

          COMPLETE BLOOD COUNT [id number]   BASO %              0.5 %     09/10/201

5 Unknown

 

          COMPLETE BLOOD COUNT [id number]   RDW                 13.6 %    09/10/201

5 Unknown

 

          COMPLETE BLOOD COUNT [id number]   ABS ARIK             2.59 10e9/L 09/10/2

015 Unknown

 

          COMPLETE BLOOD COUNT [id number]   ABS LYMPH           3.07 10e9/L 09/10/2

015 Unknown

 

          COMPLETE BLOOD COUNT [id number]   ABS MONO            0.53 10e9/L 09/10/2

015 Unknown

 

          COMPLETE BLOOD COUNT [id number]   ABS EOS             0.18 10e9/L 09/10/2

015 Unknown

 

          COMPLETE BLOOD COUNT [id number]   ABS BASO            0.03 10e9/L 09/10/2

015 Unknown

 

          COMPLETE BLOOD COUNT [id number]   RDW-SD              44.9 fL   09/10/201

5 Unknown

 

          LIPID GROUP [id number]     HDL TEST            42 MG/DL  09/10/2015 Unknown

 

          LIPID GROUP [id number]     TRIG                177 MG/DL 09/10/2015 Unknown

 

          LIPID GROUP 30141     TEST LDL            72 MG/DL  09/10/2015 Unknown

 

          LIPID GROUP 11540     CHOL                149 MG/DL 09/10/2015 Unknown

 

          LIPID GROUP [id number]     RCHOL/HDL           3.55 RATIO 09/10/2015 Unknow

n

 

          LIPID GROUP 47115     NON-HDL CH           107 MG/DL 09/10/2015 Unknow

n

 

          GLYCOSYLATED HEMOGLOBIN TEST 13541     A1C HPLC  93114-6   5.5 %     0

9/10/2015 Unknown

 

          FREE T4   07881     FREE T4             1.39 NG/DL 09/10/2015 Unknown

 

          GFR CALC  5122526   GFR AA              55.0L ML/MIN 09/10/2015 Unknow

n

 

          GFR CALC  0405222   GFR NON-AA           46.0L ML/MIN 09/10/2015 Unkno

wn

 

          COMPREHENSIVE METABOLIC 77698     AST                 17 U/L    09/10/

2015 Unknown

 

          COMPREHENSIVE METABOLIC 19708     ALT                 10 IU/L   09/10/

2015 Unknown

 

          COMPREHENSIVE METABOLIC 02204     BUN                 20 MG/DL  09/10/

2015 Unknown

 

          COMPREHENSIVE METABOLIC 35715     ALBUMIN             3.9 GM/DL 09/10/

2015 Unknown

 

          COMPREHENSIVE METABOLIC 26084     CHLORIDE            111 MMOL/L 09/10

/2015 Unknown

 

          COMPREHENSIVE METABOLIC 22417     BILI TOT            0.4 MG/DL 09/10/

2015 Unknown

 

          COMPREHENSIVE METABOLIC 69688     ALK PHOS            70 U/L    09/10/

2015 Unknown

 

          COMPREHENSIVE METABOLIC 23731     SODIUM              142 MMOL/L 09/10

/2015 Unknown

 

          COMPREHENSIVE METABOLIC 77981     CREATININE           1.16 MG/DL  Unknown

 

          COMPREHENSIVE METABOLIC 48579     CALCIUM             9.4 MG/DL 09/10/

2015 Unknown

 

          COMPREHENSIVE METABOLIC 65549     POTASSIUM           4.6 MMOL/L 09/10

/2015 Unknown

 

          COMPREHENSIVE METABOLIC 95311     PROT TOT            6.2 GM/DL 09/10/

2015 Unknown

 

          COMPREHENSIVE METABOLIC 54429     Glucose             90 MG/DL  09/10/

2015 Unknown

 

          COMPREHENSIVE METABOLIC 73431     BICARB              24 MMOL/L 09/10/

2015 Unknown

 

          COMPREHENSIVE METABOLIC 21877     ANION GAP           7 MEQ/L   09/10/

2015 Unknown

 

          THYROID STIMULATING HORMONE 36993     TSH                 2.427 uIU/ML

 2015 Unknown

 

          LIPID GROUP 98393     HDL TEST            47 MG/DL  2015 Unknown

 

          LIPID GROUP [id number]     TRIG                145 MG/DL 2015 Unknown

 

          LIPID GROUP 06553     TEST LDL            73 MG/DL  2015 Unknown

 

          LIPID GROUP 94228     CHOL                149 MG/DL 2015 Unknown

 

          LIPID GROUP 99182     RCHOL/HDL           3.17 RATIO 2015 Unknow

n

 

          LIPID GROUP 56007     NON-HDL CH           102 MG/DL 2015 Unknow

n

 

          COMPREHENSIVE METABOLIC 90475     AST                 17 U/L    2015 Unknown

 

          COMPREHENSIVE METABOLIC 55192     ALT                 9 IU/L    2015 Unknown

 

          COMPREHENSIVE METABOLIC 34634     BUN                 19 MG/DL  2015 Unknown

 

          COMPREHENSIVE METABOLIC 47498     ALBUMIN             4.3 GM/DL 2015 Unknown

 

          COMPREHENSIVE METABOLIC 42078     CHLORIDE            108 MMOL/L  Unknown

 

          COMPREHENSIVE METABOLIC 04741     BILI TOT            0.5 MG/DL 2015 Unknown

 

          COMPREHENSIVE METABOLIC 16318     ALK PHOS            68 U/L    2015 Unknown

 

          COMPREHENSIVE METABOLIC 28175     SODIUM              140 MMOL/L  Unknown

 

          COMPREHENSIVE METABOLIC 47812     CREATININE           1.08 MG/DL 2015 Unknown

 

          COMPREHENSIVE METABOLIC 16119     CALCIUM             9.9 MG/DL 2015 Unknown

 

          COMPREHENSIVE METABOLIC 00577     POTASSIUM           4.3 MMOL/L  Unknown

 

          COMPREHENSIVE METABOLIC 90040     PROT TOT            7.2 GM/DL 2015 Unknown

 

          COMPREHENSIVE METABOLIC 37716     Glucose             94 MG/DL  2015 Unknown

 

          COMPREHENSIVE METABOLIC 52464     BICARB              26 MMOL/L 2015 Unknown

 

          COMPREHENSIVE METABOLIC 09395     ANION GAP           6 MEQ/L   2015 Unknown

 

          GFR CALC  4334632   GFR AA              60.0L ML/MIN 2015 Unknow

n

 

          GFR CALC  9505983   GFR NON-AA           49.0L ML/MIN 2015 Unkno

wn

 

          GLYCOSYLATED HEMOGLOBIN TEST 03685     A1C HPLC  80984-7   5.6 %     0

3/06/2015 Unknown

 

          COMPLETE BLOOD COUNT 6367579   WBC                 7.2 10e9/L 20

15 Unknown

 

          COMPLETE BLOOD COUNT 8570418   RBC                 4.28 10e12/L 2015 Unknown

 

          COMPLETE BLOOD COUNT 0609880   HGB                 12.8 g/dL 

5 Unknown

 

          COMPLETE BLOOD COUNT 4543850   HCT DET             39.3 %    

5 Unknown

 

          COMPLETE BLOOD COUNT 5278883   MCV                 91.8 fL   

5 Unknown

 

          COMPLETE BLOOD COUNT [id number]   MCH                 29.9 pg   

5 Unknown

 

          COMPLETE BLOOD COUNT 5442152   MCHC                32.6 g/dL 

5 Unknown

 

          COMPLETE BLOOD COUNT 2130077   PLT                 189 10e9/L 20

15 Unknown

 

          COMPLETE BLOOD COUNT 4804929   MPV                 11.2 fL   

5 Unknown

 

          COMPLETE BLOOD COUNT 9133738   ARIK %               38.0 %    

5 Unknown

 

          COMPLETE BLOOD COUNT 8694799   LY %                51.0 %    

5 Unknown

 

          COMPLETE BLOOD COUNT 3808211   MON %               7.7 %     

5 Unknown

 

          COMPLETE BLOOD COUNT 8780027   EOS  %              2.9 %     

5 Unknown

 

          COMPLETE BLOOD COUNT 8482958   BASO %              0.4 %     

5 Unknown

 

          COMPLETE BLOOD COUNT 5118404   RDW                 14.0 %    

5 Unknown

 

          COMPLETE BLOOD COUNT 5381515   ABS ARIK             2.74 10e9/L 

015 Unknown

 

          COMPLETE BLOOD COUNT 4362408   ABS LYMPH           3.67 10e9/L 

015 Unknown

 

          COMPLETE BLOOD COUNT 1565949   ABS MONO            0.55 10e9/L 

015 Unknown

 

          COMPLETE BLOOD COUNT 4899483   ABS EOS             0.21 10e9/L 

015 Unknown

 

          COMPLETE BLOOD COUNT 7130626   ABS BASO            0.03 10e9/L 

015 Unknown

 

          COMPLETE BLOOD COUNT [id number]   RDW-SD              46.1 fL   

5 Unknown

 

          FREE T4   66886     FREE T4             1.14 NG/DL 2015 Unknown

 

          GLYCOSYLATED HEMOGLOBIN TEST 51865     A1C HPLC  97537-1   5.2 %     0

2014 Unknown

 

          FREE T4   33255     FREE T4             1.40 NG/DL 2014 Unknown

 

          GFR CALC  5520049   GFR AA              >60 ML/MIN 2014 Unknown

 

          GFR CALC  8227280   GFR NON-AA           52.0L ML/MIN 2014 Unkno

wn

 

          COMPREHENSIVE METABOLIC 46742     AST                 15 U/L    2014 Unknown

 

          COMPREHENSIVE METABOLIC 53605     ALT                 9 IU/L    2014 Unknown

 

          COMPREHENSIVE METABOLIC 74577     BUN                 17 MG/DL  2014 Unknown

 

          COMPREHENSIVE METABOLIC 50786     ALBUMIN             4.0 GM/DL 2014 Unknown

 

          COMPREHENSIVE METABOLIC 66615     CHLORIDE            112 MMOL/L  Unknown

 

          COMPREHENSIVE METABOLIC 97238     BILI TOT            0.5 MG/DL 2014 Unknown

 

          COMPREHENSIVE METABOLIC 77761     ALK PHOS            66 U/L    2014 Unknown

 

          COMPREHENSIVE METABOLIC 17015     SODIUM              140 MMOL/L  Unknown

 

          COMPREHENSIVE METABOLIC 13506     CREATININE           1.03 MG/DL  Unknown

 

          COMPREHENSIVE METABOLIC 76424     CALCIUM             9.5 MG/DL 2014 Unknown

 

          COMPREHENSIVE METABOLIC 53300     POTASSIUM           4.1 MMOL/L  Unknown

 

          COMPREHENSIVE METABOLIC 80788     PROT TOT            6.2 GM/DL 2014 Unknown

 

          COMPREHENSIVE METABOLIC 58592     Glucose             102 MG/DL 2014 Unknown

 

          COMPREHENSIVE METABOLIC 18208     BICARB              23 MMOL/L 2014 Unknown

 

          COMPREHENSIVE METABOLIC 27519     ANION GAP           5 MEQ/L   2014 Unknown

 

          THYROID STIMULATING HORMONE 23633     TSH                 2.074 uIU/ML

 2014 Unknown

 

          VITAMIN B 12 FOLIC ACID 26283|[id number] VIT B 12            423 PG/ML  Unknown

 

          VITAMIN B 12 FOLIC ACID 45205|[id number] FOLIC ACID           19.7 NG/ML  Unknown

 

          LIPID GROUP 54304     HDL TEST            40 MG/DL  2014 Unknown

 

          LIPID GROUP [id number]     TRIG                145 MG/DL 2014 Unknown

 

          LIPID GROUP 46673     TEST LDL            81 MG/DL  2014 Unknown

 

          LIPID GROUP [id number]     CHOL                150 MG/DL 2014 Unknown

 

          LIPID GROUP 91131     RCHOL/HDL           3.75 RATIO 2014 Unknow

n

 

          COMPLETE BLOOD COUNT 6952276   WBC                 6.0 10e9/L 20

14 Unknown

 

          COMPLETE BLOOD COUNT 4585914   RBC                 4.26 10e12/L 2014 Unknown

 

          COMPLETE BLOOD COUNT 6970106   HGB                 12.7 g/dL 

4 Unknown

 

          COMPLETE BLOOD COUNT 3296707   HCT DET             38.7 %    

4 Unknown

 

          COMPLETE BLOOD COUNT [id number]   MCV                 90.8 fL   

4 Unknown

 

          COMPLETE BLOOD COUNT 9596243   MCH                 29.8 pg   

4 Unknown

 

          COMPLETE BLOOD COUNT 8557501   MCHC                32.8 g/dL 

4 Unknown

 

          COMPLETE BLOOD COUNT [id number]   PLT                 178 10e9/L 20

14 Unknown

 

          COMPLETE BLOOD COUNT [id number]   MPV                 11.7 fL   

4 Unknown

 

          COMPLETE BLOOD COUNT [id number]   ARIK %               30.5 %    

4 Unknown

 

          COMPLETE BLOOD COUNT [id number]   LY %                55.4 %    

4 Unknown

 

          COMPLETE BLOOD COUNT [id number]   MON %               9.0 %     

4 Unknown

 

          COMPLETE BLOOD COUNT [id number]   EOS  %              4.4 %     

4 Unknown

 

          COMPLETE BLOOD COUNT [id number]   BASO %              0.7 %     

4 Unknown

 

          COMPLETE BLOOD COUNT [id number]   RDW                 13.3 %    

4 Unknown

 

          COMPLETE BLOOD COUNT [id number]   ABS ARIK             1.83 10e9/L 

014 Unknown

 

          COMPLETE BLOOD COUNT [id number]   ABS LYMPH           3.32 10e9/L 

014 Unknown

 

          COMPLETE BLOOD COUNT [id number]   ABS MONO            0.54 10e9/L 

014 Unknown

 

          COMPLETE BLOOD COUNT [id number]   ABS EOS             0.26 10e9/L 

014 Unknown

 

          COMPLETE BLOOD COUNT [id number]   ABS BASO            0.04 10e9/L 

014 Unknown

 

          COMPLETE BLOOD COUNT [id number]   RDW-SD              43.2 fL   

4 Unknown

 

          HEMOGLOBIN A1C (GLYCOSYLATED) [id number]   A1C HPLC  26286-4   5.5 %     

2012 Unknown

 

          COMPLETE BLOOD COUNT [id number]   WBC                 6.0 10e9/L 20

12 Unknown

 

          COMPLETE BLOOD COUNT [id number]   RBC                 4.22 10e12/L 2012 Unknown

 

          COMPLETE BLOOD COUNT 9091764   HGB                 12.4 g/dL 

2 Unknown

 

          COMPLETE BLOOD COUNT [id number]   HCT DET             38.2 %    

2 Unknown

 

          COMPLETE BLOOD COUNT [id number]   MCV                 90.5 fL   

2 Unknown

 

          COMPLETE BLOOD COUNT 1809985   MCH                 29.4 pg   

2 Unknown

 

          COMPLETE BLOOD COUNT 3886513   MCHC                32.5 g/dL 

2 Unknown

 

          COMPLETE BLOOD COUNT [id number]   PLT                 187 10e9/L 20

12 Unknown

 

          COMPLETE BLOOD COUNT [id number]   MPV                 11.5 fL   

2 Unknown

 

          COMPLETE BLOOD COUNT 8393726   ARIK %               36.4 %    

2 Unknown

 

          COMPLETE BLOOD COUNT 9792693   LY %                51.0 %    

2 Unknown

 

          COMPLETE BLOOD COUNT [id number]   MON %               8.7 %     

2 Unknown

 

          COMPLETE BLOOD COUNT [id number]   EOS  %              3.2 %     

2 Unknown

 

          COMPLETE BLOOD COUNT [id number]   BASO %              0.7 %     

2 Unknown

 

          COMPLETE BLOOD COUNT [id number]   RDW                 13.7 %    

2 Unknown

 

          COMPLETE BLOOD COUNT [id number]   ABS ARIK             2.18 10e9/L 

012 Unknown

 

          COMPLETE BLOOD COUNT [id number]   ABS LYMPH           3.06 10e9/L 

012 Unknown

 

          COMPLETE BLOOD COUNT [id number]   ABS MONO            0.52 10e9/L 

012 Unknown

 

          COMPLETE BLOOD COUNT [id number]   ABS EOS             0.19 10e9/L 

012 Unknown

 

          COMPLETE BLOOD COUNT [id number]   ABS BASO            0.04 10e9/L 

012 Unknown

 

          COMPLETE BLOOD COUNT [id number]   RDW-SD              44.3 fL   

2 Unknown

 

          LIPID GROUP [id number]     HDL TEST            42 MG/DL  2012 Unknown

 

          LIPID GROUP [id number]     TRIG                156 MG/DL 2012 Unknown

 

          LIPID GROUP [id number]     TEST LDL            80 MG/DL  2012 Unknown

 

          LIPID GROUP [id number]     CHOL                153 MG/DL 2012 Unknown

 

          LIPID GROUP [id number]     RCHOL/HDL           3.64 RATIO 2012 Unknow

n

 

          FREE T4   85444     FREE T4             1.22 NG/DL 2012 Unknown

 

          COMPREHENSIVE METABOLIC 95388     AST                 20 U/L    2012 Unknown

 

          COMPREHENSIVE METABOLIC 11549     ALT                 11 IU/L   2012 Unknown

 

          COMPREHENSIVE METABOLIC 47773     BUN                 19 MG/DL  2012 Unknown

 

          COMPREHENSIVE METABOLIC 74936     ALBUMIN             4.3 GM/DL 2012 Unknown

 

          COMPREHENSIVE METABOLIC 99542     CHLORIDE            109 MMOL/L  Unknown

 

          COMPREHENSIVE METABOLIC 32121     BILI TOT            0.6 MG/DL 2012 Unknown

 

          COMPREHENSIVE METABOLIC 85351     ALK PHOS            84 U/L    2012 Unknown

 

          COMPREHENSIVE METABOLIC 94063     SODIUM              142 MMOL/L  Unknown

 

          COMPREHENSIVE METABOLIC 18089     CREATININE           1.09 MG/DL  Unknown

 

          COMPREHENSIVE METABOLIC 31413     CALCIUM             9.8 MG/DL 2012 Unknown

 

          COMPREHENSIVE METABOLIC 55837     POTASSIUM           4.2 MMOL/L  Unknown

 

          COMPREHENSIVE METABOLIC 20016     PROT TOT            6.4 GM/DL 2012 Unknown

 

          COMPREHENSIVE METABOLIC 49400     Glucose             89 MG/DL  2012 Unknown

 

          COMPREHENSIVE METABOLIC 81952     BICARB              25 MMOL/L 2012 Unknown

 

          COMPREHENSIVE METABOLIC 74575     ANION GAP           8 MEQ/L   2012 Unknown

 

          GFR CALC  4699431   GFR AA              60.0L ML/MIN 2012 Unknow

n

 

          GFR CALC  9471679   GFR NON-AA           49.0L ML/MIN 2012 Unkno

wn

 

          THYROID STIMULATING HORMONE 25737     TSH                 2.450 uIU/ML

 2012 Unknown

 

          COMPREHENSIVE METABOLIC 95000     AST                 22 U/L    2012 Unknown

 

          COMPREHENSIVE METABOLIC 15465     ALT                 14 IU/L   2012 Unknown

 

          COMPREHENSIVE METABOLIC 50260     BUN                 21 MG/DL  2012 Unknown

 

          COMPREHENSIVE METABOLIC 86952     ALBUMIN             4.3 GM/DL 2012 Unknown

 

          COMPREHENSIVE METABOLIC 38462     CHLORIDE            106 MMOL/L  Unknown

 

          COMPREHENSIVE METABOLIC 19226     BILI TOT            0.4 MG/DL 2012 Unknown

 

          COMPREHENSIVE METABOLIC 51047     ALK PHOS            80 U/L    2012 Unknown

 

          COMPREHENSIVE METABOLIC 94355     SODIUM              141 MMOL/L  Unknown

 

          COMPREHENSIVE METABOLIC 23279     CREATININE           1.13 MG/DL  Unknown

 

          COMPREHENSIVE METABOLIC 19556     CALCIUM             9.4 MG/DL 2012 Unknown

 

          COMPREHENSIVE METABOLIC 46272     POTASSIUM           4.3 MMOL/L  Unknown

 

          COMPREHENSIVE METABOLIC 19792     PROT TOT            6.7 GM/DL 2012 Unknown

 

          COMPREHENSIVE METABOLIC 37139     Glucose             98 MG/DL  2012 Unknown

 

          COMPREHENSIVE METABOLIC 29498     BICARB              25 MMOL/L 2012 Unknown

 

          COMPREHENSIVE METABOLIC 22017     ANION GAP           10 MEQ/L  2012 Unknown

 

          LIPID GROUP [id number]     HDL TEST            44 MG/DL  2012 Unknown

 

          LIPID GROUP [id number]     TRIG                164 MG/DL 2012 Unknown

 

          LIPID GROUP [id number]     TEST LDL            98 MG/DL  2012 Unknown

 

          LIPID GROUP 29775     CHOL                175 MG/DL 2012 Unknown

 

          LIPID GROUP [id number]     RCHOL/HDL           3.98 RATIO 2012 Unknow

n

 

          COMPLETE BLOOD COUNT 34145     WBC                 6.7 10e9/L 20

12 Unknown

 

          COMPLETE BLOOD COUNT 13513     RBC                 4.36 10e12/L 2012 Unknown

 

          COMPLETE BLOOD COUNT 82764     HGB                 12.9 g/dL 

2 Unknown

 

          COMPLETE BLOOD COUNT 82111     HCT DET             39.4 %    

2 Unknown

 

          COMPLETE BLOOD COUNT 66704     MCV                 90.4 fL   

2 Unknown

 

          COMPLETE BLOOD COUNT 80284     MCH                 29.6 pg   

2 Unknown

 

          COMPLETE BLOOD COUNT 01107     MCHC                32.7 g/dL 

2 Unknown

 

          COMPLETE BLOOD COUNT 14758     PLT                 184 10e9/L 20

12 Unknown

 

          COMPLETE BLOOD COUNT 30006     MPV                 10.9 fL   

2 Unknown

 

          COMPLETE BLOOD COUNT 22902     ARIK %               41.5 %    

2 Unknown

 

          COMPLETE BLOOD COUNT 04915     LY %                45.7 %    

2 Unknown

 

          COMPLETE BLOOD COUNT 17446     MON %               9.4 %     

2 Unknown

 

          COMPLETE BLOOD COUNT 24704     EOS  %              3.0 %     

2 Unknown

 

          COMPLETE BLOOD COUNT 68058     BASO %              0.4 %     

2 Unknown

 

          COMPLETE BLOOD COUNT 19924     RDW                 13.2 %    

2 Unknown

 

          COMPLETE BLOOD COUNT 56311     ABS ARIK             2.78 10e9/L 

012 Unknown

 

          COMPLETE BLOOD COUNT 37236     ABS LYMPH           3.06 10e9/L 

012 Unknown

 

          COMPLETE BLOOD COUNT 70415     ABS MONO            0.63 10e9/L 

012 Unknown

 

          COMPLETE BLOOD COUNT 82961     ABS EOS             0.20 10e9/L 

012 Unknown

 

          COMPLETE BLOOD COUNT 66486     ABS BASO            0.03 10e9/L 

012 Unknown

 

          COMPLETE BLOOD COUNT 80089     RDW-SD              42.3 fL   

2 Unknown

 

          GFR CALC  9418376   GFR AA              57.0L ML/MIN 2012 Unknow

n

 

          GFR CALC  0361846   GFR NON-AA           47.0L ML/MIN 2012 Unkno

wn

 

          THYROID STIMULATING HORMONE 89202     TSH                 2.663 uIU/ML

 2012 Unknown

 

          FREE T4   60243     FREE T4             1.15 NG/DL 2012 Unknown

 

          THYROID STIMULATING HORMONE 66371     TSH                 1.908 uIU/ML

 2011 Unknown

 

          COMPLETE BLOOD COUNT 85637     WBC                 6.4 10e9/L 20

11 Unknown

 

          COMPLETE BLOOD COUNT 93214     RBC                 3.92 10e12/L 2011 Unknown

 

          COMPLETE BLOOD COUNT 06035     HGB                 11.8 g/dL 

1 Unknown

 

          COMPLETE BLOOD COUNT 46149     HCT DET             36.0 %    

1 Unknown

 

          COMPLETE BLOOD COUNT 99955     MCV                 91.8 fL   

1 Unknown

 

          COMPLETE BLOOD COUNT 19748     MCH                 30.1 pg   

1 Unknown

 

          COMPLETE BLOOD COUNT 19995     MCHC                32.8 g/dL 

1 Unknown

 

          COMPLETE BLOOD COUNT 33974     PLT                 176 10e9/L 20

11 Unknown

 

          COMPLETE BLOOD COUNT 87279     MPV                 11.4 fL   

1 Unknown

 

          COMPLETE BLOOD COUNT 23576     ARIK %               50.4 %    

1 Unknown

 

          COMPLETE BLOOD COUNT 09890     LY %                35.5 %    

1 Unknown

 

          COMPLETE BLOOD COUNT 47462     MON %               10.2 %    

1 Unknown

 

          COMPLETE BLOOD COUNT 83596     EOS  %              3.3 %     

1 Unknown

 

          COMPLETE BLOOD COUNT 26536     BASO %              0.6 %     

1 Unknown

 

          COMPLETE BLOOD COUNT 92973     RDW                 13.7 %    

1 Unknown

 

          COMPLETE BLOOD COUNT 25624     ABS ARIK             3.23 10e9/L 

011 Unknown

 

          COMPLETE BLOOD COUNT 80626     ABS LYMPH           2.27 10e9/L 

011 Unknown

 

          COMPLETE BLOOD COUNT 56433     ABS MONO            0.65 10e9/L 

011 Unknown

 

          COMPLETE BLOOD COUNT 09735     ABS EOS             0.21 10e9/L 

011 Unknown

 

          COMPLETE BLOOD COUNT 54203     ABS BASO            0.04 10e9/L 

011 Unknown

 

          COMPLETE BLOOD COUNT 73071     RDW-SD              45.3 fL   

1 Unknown

 

          GFR CALC  0033361   GFR AA              >60 ML/MIN 2011 Unknown

 

          GFR CALC  9029538   GFR NON-AA           53.0L ML/MIN 2011 Unkno

wn

 

          FREE T4   61858     FREE T4             1.20 NG/DL 2011 Unknown

 

          COMPREHENSIVE METABOLIC 23721     AST                 17 U/L    2011 Unknown

 

          COMPREHENSIVE METABOLIC 27207     ALT                 9 IU/L    2011 Unknown

 

          COMPREHENSIVE METABOLIC 49778     BUN                 16 MG/DL  2011 Unknown

 

          COMPREHENSIVE METABOLIC 76051     ALBUMIN             4.0 GM/DL 2011 Unknown

 

          COMPREHENSIVE METABOLIC 32459     CHLORIDE            108 MMOL/L  Unknown

 

          COMPREHENSIVE METABOLIC 58971     BILI TOT            0.5 MG/DL 2011 Unknown

 

          COMPREHENSIVE METABOLIC 80593     ALK PHOS            76 U/L    2011 Unknown

 

          COMPREHENSIVE METABOLIC 68907     SODIUM              139 MMOL/L  Unknown

 

          COMPREHENSIVE METABOLIC 63704     CREATININE           1.02 MG/DL 2011 Unknown

 

          COMPREHENSIVE METABOLIC 62211     CALCIUM             9.2 MG/DL 2011 Unknown

 

          COMPREHENSIVE METABOLIC 71933     POTASSIUM           4.5 MMOL/L  Unknown

 

          COMPREHENSIVE METABOLIC 69358     PROT TOT            6.1 GM/DL 2011 Unknown

 

          COMPREHENSIVE METABOLIC 89623     Glucose             93 MG/DL  2011 Unknown

 

          COMPREHENSIVE METABOLIC 10557     BICARB              26 MMOL/L 2011 Unknown

 

          COMPREHENSIVE METABOLIC 21046     ANION GAP           5 MEQ/L   2011 Unknown

 

          LIPID GROUP 00946     HDL TEST            46 MG/DL  2011 Unknown

 

          LIPID GROUP 21408     TRIG                102 MG/DL 2011 Unknown

 

          LIPID GROUP 14586     TEST LDL            88 MG/DL  2011 Unknown

 

          LIPID GROUP 44336     CHOL                154 MG/DL 2011 Unknown

 

          LIPID GROUP 83360     RCHOL/HDL           3.35 RATIO 2011 Unknow

n







Procedures





                    Procedure           Codes               Date

 

                    ROUTINE VENIPUNCTURE CPT-4: 48546        2020

 

                    ASSAY THYROID STIM HORMONE CPT-4: 47932        2020

 

                    COMPLETE CBC W/AUTO DIFF WBC CPT-4: 87229        2020

 

                    COMPREHEN METABOLIC PANEL CPT-4: 24460        2020

 

                    ROUTINE VENIPUNCTURE CPT-4: 73328        2019

 

                    LIPID PANEL         CPT-4: 72646        2019

 

                    FLU VACC PRSV FREE INC ANTIG 65 AND OLDER CPT-4: 79564      

  10/22/2019

 

                    FLU VACC PRSV FREE INC ANTIG 65 AND OLDER CPT-4: 08770      

  10/22/2019

 

                    ADMIN INFLUENZA VIRUS VAC CPT-4:         10/22/2019

 

                    COMPREHEN METABOLIC PANEL CPT-4: 63638        10/11/2019

 

                    ROUTINE VENIPUNCTURE CPT-4: 38575        10/11/2019

 

                    ROUTINE VENIPUNCTURE CPT-4: 97620        06/10/2019

 

                    ASSAY THYROID STIM HORMONE CPT-4: 41699        06/10/2019

 

                    COMPREHEN METABOLIC PANEL CPT-4: 56881        06/10/2019

 

                    COMPLETE CBC W/AUTO DIFF WBC CPT-4: 86625        06/10/2019

 

                    URINALYSIS NONAUTO W/O SCOPE CPT-4: 87403        2019

 

                    URINE CULTURE/ COLONY COUNT CPT-4: 20187        2019

 

                    URINE CULTURE/ COLONY COUNT CPT-4: 40105        10/02/2018

 

                    ROUTINE VENIPUNCTURE CPT-4: 11448        2018

 

                    ASSAY OF FREE THYROXINE CPT-4: 70572        2018

 

                    ASSAY THYROID STIM HORMONE CPT-4: 43315        2018

 

                    COMPLETE CBC W/AUTO DIFF WBC CPT-4: 67234        2018

 

                    METABOLIC PANEL TOTAL CA CPT-4: 93589        2018

 

                    FLU VACC PRSV FREE INC ANTIG 65 AND OLDER CPT-4: 41022      

  2018

 

                    ASSAY, GLUCOSE, BLOOD QUANT CPT-4: 52324        2018

 

                    ADMIN INFLUENZA VIRUS VAC CPT-4:         2018

 

                    ROUTINE VENIPUNCTURE CPT-4: 65739        2018

 

                    COMPREHEN METABOLIC PANEL CPT-4: 12453        2018

 

                    COMPLETE CBC W/AUTO DIFF WBC CPT-4: 49863        2018

 

                    A1C HPLC            CPT-4: 10107        2018

 

                    ASSAY OF TROPONIN QUANT CPT-4: 61399        2018

 

                    LIPID PANEL         CPT-4: 22162        2018

 

                    THER/PROPH/DIAG INJ SC/IM CPT-4: 98723        2018

 

                    TRIAMCINOLONE ACET INJ NOS CPT-4:         2018

 

                    URINALYSIS NONAUTO W/O SCOPE CPT-4: 48208        2018

 

                    URINE CULTURE/ COLONY COUNT CPT-4: 80821        2018

 

                    FLU VACC PRSV FREE INC ANTIG 65 AND OLDER CPT-4: 83549      

  10/06/2017

 

                    ADMIN INFLUENZA VIRUS VAC CPT-4:         10/06/2017

 

                    ROUTINE VENIPUNCTURE CPT-4: 08916        2017

 

                    COMPREHEN METABOLIC PANEL CPT-4: 11534        2017

 

                    COMPLETE CBC W/AUTO DIFF WBC CPT-4: 45928        2017

 

                    LIPID PANEL         CPT-4: 42749        2017

 

                    A1C HPLC            CPT-4: 87764        2017

 

                    ASSAY THYROID STIM HORMONE CPT-4: 52252        2017

 

                    ROUTINE VENIPUNCTURE CPT-4: 70799        2017

 

                    ASSAY THYROID STIM HORMONE CPT-4: 39954        2017

 

                    COMPREHEN METABOLIC PANEL CPT-4: 42544        2017

 

                    COMPLETE CBC W/AUTO DIFF WBC CPT-4: 72526        2017

 

                    A1C HPLC            CPT-4: 60504        2017

 

                    FLU VACC PRSV FREE INC ANTIG 65 AND OLDER CPT-4: 42229      

  10/07/2016

 

                    ADMIN INFLUENZA VIRUS VAC CPT-4:         10/07/2016

 

                    ROUTINE VENIPUNCTURE CPT-4: 84651        2016

 

                    ASSAY OF FREE THYROXINE CPT-4: 49794        2016

 

                    ASSAY THYROID STIM HORMONE CPT-4: 15374        2016

 

                    COMPREHEN METABOLIC PANEL CPT-4: 21306        2016

 

                    COMPLETE CBC W/AUTO DIFF WBC CPT-4: 61111        2016

 

                    LIPID PANEL         CPT-4: 14336        2016

 

                    A1C HPLC            CPT-4: 49555        2016

 

                    URINALYSIS NONAUTO W/O SCOPE CPT-4: 38047        2016

 

                    ROUTINE VENIPUNCTURE CPT-4: 54108        2015

 

                    METABOLIC PANEL TOTAL CA CPT-4: 09657        2015

 

                    PRESCRIP TRANSMIT VIA ERX SY CPT-4:         2015

 

                    FLU VACC PRSV FREE INC ANTIG 65 AND OLDER CPT-4: 53839      

  10/16/2015

 

                    ADMIN INFLUENZA VIRUS VAC CPT-4:         10/16/2015

 

                    URINALYSIS NONAUTO W/O SCOPE CPT-4: 58136        09/15/2015

 

                    URINE CULTURE/ COLONY COUNT CPT-4: 66478        09/15/2015

 

                    ROUTINE VENIPUNCTURE CPT-4: 81885        09/10/2015

 

                    ASSAY OF FREE THYROXINE CPT-4: 71106        09/10/2015

 

                    ASSAY THYROID STIM HORMONE CPT-4: 44101        09/10/2015

 

                    COMPREHEN METABOLIC PANEL CPT-4: 79555        09/10/2015

 

                    COMPLETE CBC W/AUTO DIFF WBC CPT-4: 68429        09/10/2015

 

                    LIPID PANEL         CPT-4: 45744        09/10/2015

 

                    A1C HPLC            CPT-4: 18733        09/10/2015

 

                    CERUM REMOVAL       CPT-4: 36394        2015

 

                    PRESCRIP TRANSMIT VIA ERX SY CPT-4:         2015

 

                    FLUZONE, 5ML (Medicare) CPT-4:         10/17/2014

 

                    ADMIN INFLUENZA VIRUS VAC CPT-4:         10/17/2014

 

                    PRESCRIP TRANSMIT VIA ERX SY CPT-4:         2014

 

                    PRESCRIP TRANSMIT VIA ERX SY CPT-4:         2014

 

                    PRESCRIP TRANSMIT VIA ERX SY CPT-4:         2014

 

                    ROUTINE VENIPUNCTURE CPT-4: 92882        2014

 

                    ASSAY OF FREE THYROXINE CPT-4: 86216        2014

 

                    ASSAY THYROID STIM HORMONE CPT-4: 97269        2014

 

                    COMPREHEN METABOLIC PANEL CPT-4: 84447        2014

 

                    COMPLETE CBC W/AUTO DIFF WBC CPT-4: 45809        2014

 

                    LIPID PANEL         CPT-4: 67872        2014

 

                    A1C HPLC            CPT-4: 97120        2014

 

                    VITAMIN B 12 FOLIC ACID CPT-4: 35653|74871  2014

 

                    PRESCRIP TRANSMIT VIA ERX SY CPT-4:         2014

 

                    PRESCRIP TRANSMIT VIA ERX SY CPT-4:         10/15/2013

 

                    FLUZONE, 5ML (Medicare) CPT-4:         2013

 

                    ADMIN INFLUENZA VIRUS VAC CPT-4:         2013

 

                    PRESCRIP TRANSMIT VIA ERX SY CPT-4:         2013

 

                    PRESCRIP TRANSMIT VIA ERX SY CPT-4:         05/10/2013

 

                    ROUTINE VENIPUNCTURE CPT-4: 18721        2012

 

                    ASSAY OF FREE THYROXINE CPT-4: 59537        2012

 

                    ASSAY THYROID STIM HORMONE CPT-4: 88282        2012

 

                    COMPREHEN METABOLIC PANEL CPT-4: 07766        2012

 

                    COMPLETE CBC W/AUTO DIFF WBC CPT-4: 50075        2012

 

                    LIPID PANEL         CPT-4: 23362        2012

 

                    A1C GLYCOSYLATED HEMOGLOBIN TEST CPT-4: 33750        

012

 

                    CERUM REMOVAL       CPT-4: 98108        2012

 

                    PRESCRIP TRANSMIT VIA ERX SY CPT-4:         2012

 

                    PRESCRIP TRANSMIT VIA ERX SY CPT-4:         10/10/2012

 

                    FLUZONE, 5ML (Medicare) CPT-4:         2012

 

                    ADMIN INFLUENZA VIRUS VAC CPT-4:         2012

 

                    ASSAY, GLUCOSE, BLOOD QUANT CPT-4: 44931        2012

 

                    URINALYSIS NONAUTO W/O SCOPE CPT-4: 96058        2012

 

                    URINE CULTURE/ COLONY COUNT CPT-4: 59705        2012

 

                    ROUTINE VENIPUNCTURE CPT-4: 10867        2012

 

                    ASSAY OF FREE THYROXINE CPT-4: 69595        2012

 

                    ASSAY THYROID STIM HORMONE CPT-4: 44991        2012

 

                    COMPREHEN METABOLIC PANEL CPT-4: 21301        2012

 

                    COMPLETE CBC W/AUTO DIFF WBC CPT-4: 90022        2012

 

                    LIPID PANEL         CPT-4: 27067        2012

 

                    ASSAY OF INSULIN    CPT-4: 55636        2012

 

                    A1C GLYCOSYLATED HEMOGLOBIN TEST CPT-4: 17833        

012

 

                    DRAIN/INJECT JOINT/BURSA CPT-4: 46930        2012

 

                    METHYLPREDNISOLONE 40 MG INJ CPT-4:         2012

 

                    TRIAMCINOLONE ACET INJ NOS CPT-4:         2012

 

                    PRESCRIP TRANSMIT VIA ERX SY CPT-4:         2012

 

                    PRESCRIP TRANSMIT VIA ERX SY CPT-4:         2012

 

                    METHYLPREDNISOLONE 40 MG INJ CPT-4:         2012

 

                    DRAIN/INJECT JOINT/BURSA CPT-4: 86707        2012

 

                    TRIAMCINOLONE ACET INJ NOS CPT-4:         2012

 

                    PRESCRIP TRANSMIT VIA ERX SY CPT-4:         2012

 

                    ROUTINE VENIPUNCTURE CPT-4: 67396        2012

 

                    ASSAY OF FREE THYROXINE CPT-4: 41317        2012

 

                    ASSAY THYROID STIM HORMONE CPT-4: 32600        2012

 

                    COMPREHEN METABOLIC PANEL CPT-4: 91515        2012

 

                    COMPLETE CBC W/AUTO DIFF WBC CPT-4: 25981        2012

 

                    LIPID PANEL         CPT-4: 23893        2012

 

                    PRESCRIP TRANSMIT VIA ERX SY CPT-4:         2012

 

                    CERUM REMOVAL       CPT-4: 38028        2011

 

                    PRESCRIP TRANSMIT VIA ERX SY CPT-4:         2011

 

                    FLUZONE, 5ML (Medicare) CPT-4:         10/05/2011

 

                    ADMIN INFLUENZA VIRUS VAC CPT-4:         10/05/2011

 

                    PRESCRIP TRANSMIT VIA ERX SY CPT-4:         2011

 

                    URINALYSIS NONAUTO W/O SCOPE CPT-4: 43506        2011

 

                    URINE CULTURE/ COLONY COUNT CPT-4: 29766        2011

 

                    CUR TOBACCO NON-USER CPT-4:         2011

 

                    ROUTINE VENIPUNCTURE CPT-4: 80237        2011

 

                    COMPLETE CBC W/AUTO DIFF WBC CPT-4: 10299        2011

 

                    COMPREHEN METABOLIC PANEL CPT-4: 42326        2011

 

                    LIPID PANEL         CPT-4: 87861        2011

 

                    ASSAY THYROID STIM HORMONE CPT-4: 80683        2011

 

                    ASSAY OF FREE THYROXINE CPT-4: 98579        2011

 

                    PRESCRIP TRANSMIT VIA ERX SY CPT-4:         2011

 

                    INJ TRIGGER POINT 1/2 MUSCL CPT-4: 89531        2011

 

                    TRIAMCINOLONE ACET INJ NOS CPT-4:         2011

 

                    METHYLPREDNISOLONE 40 MG INJ CPT-4:         2011

 

                    THER/PROPH/DIAG INJ SC/IM CPT-4: 42533        2011

 

                    KETOROLAC TROMETHAMINE INJ CPT-4:         2011

 

                    PRESCRIP TRANSMIT VIA ERX SY CPT-4:         2010

 

                    FLU VACCINE 3 YRS & > IM UP 64 CPT-4: 49842        10/06/201

0

 

                    ADMIN INFLUENZA VIRUS VAC CPT-4:         10/06/2010

 

                    URINALYSIS NONAUTO W/O SCOPE CPT-4: 96583        2010

 

                    URINE CULTURE/ COLONY COUNT CPT-4: 91753        2010

 

                    PRESCRIP TRANSMIT VIA ERX SY CPT-4:         2010

 

                    THER/PROPH/DIAG INJ SC/IM CPT-4: 40500        2010

 

                    VITAMIN B12 INJECTION CPT-4:         2010

 

                    THER/PROPH/DIAG INJ SC/IM CPT-4: 61712        2010

 

                    VITAMIN B12 INJECTION CPT-4:         2010

 

                    ROUTINE VENIPUNCTURE CPT-4: 25095        2010







Vital Signs





                          Date                      Vital

 

                2020      Blood Pressure 1: 138/64 Code: 8480-6 Heart Rate

 1: 52 bpm 

Respiratory Rate: 18 bpm  SpO2: 98%                 Temperature: 36.3 (C) / 97.4

 (F)

 

                    2020          Blood Pressure 1: 144/82 Code: 8480-6 He

art Rate 1: 56 bpm

 

                2020      Blood Pressure 1: 154/86 Code: 8480-6 Heart Rate

 1: 64 bpm 

Respiratory Rate: 20 bpm SpO2: 99%           Temperature: 37.1 (C) / 98.7 (F) We

ight: 215 

lbs 

 

             2019   Blood Pressure 1: 140/70 Code: 8480-6 Heart Rate 1: 57

 bpm SpO2: 99%    

Temperature: 35.9 (C) / 96.6 (F)        Weight: 215 lbs 

 

                06/10/2019      Blood Pressure 1: 144/70 Code: 8480-6 Heart Rate

 1: 60 bpm 

Respiratory Rate: 20 bpm SpO2: 95%           Temperature: 36.4 (C) / 97.6 (F) We

ight: 214 

lbs 

 

                2019      Blood Pressure 1: 128/78 Code: 8480-6 Heart Rate

 1: 56 bpm 

Respiratory Rate: 20 bpm SpO2: 98%           Temperature: 36.3 (C) / 97.4 (F) We

ight: 213 

lbs 

 

                2018      Blood Pressure 1: 142/60 Code: 8480-6 BMI: 38.2 

Code: 02539-2 Heart 

Rate 1: 48 bpm  Height: 5'2"    Respiratory Rate: 20 bpm SpO2: 98%       Tempera

ture: 36.7

(C) / 98.1 (F)                          Weight: 212 lbs 

 

                2018      Blood Pressure 1: 142/64 Code: 8480-6 BMI: 38.5 

Code: 61327-0 Heart 

Rate 1: 52 bpm  Height: 5'2"    Respiratory Rate: 22 bpm SpO2: 96%       Tempera

ture: 36.1

(C) / 96.9 (F)                          Weight: 214 lbs 

 

                10/02/2018      Blood Pressure 1: 124/80 Code: 8480-6 BMI: 38.3 

Code: 40684-1 Heart 

Rate 1: 68 bpm      Height: 5'2"        Respiratory Rate: 20 bpm Temperature: 36

.3 (C) / 

97.4 (F)                                Weight: 213 lbs 

 

                2018      Blood Pressure 1: 132/78 Code: 8480-6 BMI: 37.6 

Code: 12982-4 Heart 

Rate 1: 68 bpm  Height: 5'2"    Respiratory Rate: 20 bpm SpO2: 97%       Tempera

ture: 36.8

(C) / 98.2 (F)                          Weight: 209 lbs 

 

                2018      Blood Pressure 1: 150/76 Code: 8480-6 BMI: 38.5 

Code: 41620-2 Heart 

Rate 1: 64 bpm  Height: 5'2"    Respiratory Rate: 20 bpm SpO2: 97%       Tempera

ture: 36.2

(C) / 97.2 (F)                          Weight: 214 lbs 

 

                2018      Blood Pressure 1: 122/74 Code: 8480-6 BMI: 38.2 

Code: 15511-0 Heart 

Rate 1: 64 bpm  Height: 5'2"    Respiratory Rate: 18 bpm SpO2: 96%       Tempera

ture: 35.8

(C) / 96.4 (F)                          Weight: 212 lbs 

 

                2018      Blood Pressure 1: 124/78 Code: 8480-6 BMI: 37.8 

Code: 29320-9 Heart 

Rate 1: 76 bpm      Height: 5'2"        Respiratory Rate: 20 bpm Temperature: 36

.8 (C) / 

98.3 (F)                                Weight: 210 lbs 

 

                2018      Blood Pressure 1: 136/70 Code: 8480-6 BMI: 38.0 

Code: 25252-0 Heart 

Rate 1: 68 bpm  Height: 5'2"    Respiratory Rate: 20 bpm SpO2: 97%       Tempera

ture: 36.8

(C) / 98.2 (F)                          Weight: 211 lbs 

 

                2018      Blood Pressure 1: 140/65 Code: 8480-6 Heart Rate

 1: 75 bpm 

Respiratory Rate: 24 bpm SpO2: 95%           Temperature: 37.0 (C) / 98.6 (F) We

ight: 211 

lbs 

 

                2018      Blood Pressure 1: 154/70 Code: 8480-6 BMI: 37.6 

Code: 31514-0 Heart 

Rate 1: 76 bpm  Height: 5'2"    Respiratory Rate: 20 bpm SpO2: 98%       Tempera

ture: 36.9

(C) / 98.5 (F)                          Weight: 209 lbs 

 

                2017      Blood Pressure 1: 156/70 Code: 8480-6 BMI: 37.1 

Code: 52185-3 Heart 

Rate 1: 72 bpm  Height: 5'2"    Respiratory Rate: 20 bpm SpO2: 97%       Tempera

ture: 37.0

(C) / 98.6 (F)                          Weight: 206 lbs 

 

                2017      Blood Pressure 1: 152/78 Code: 8480-6 BMI: 36.8 

Code: 71366-6 Heart 

Rate 1: 78 bpm  Height: 5'2"    Respiratory Rate: 20 bpm SpO2: 98%       Tempera

ture: 36.1

(C) / 97.0 (F)                          Weight: 204 lbs 

 

                2017      Blood Pressure 1: 142/70 Code: 8480-6 BMI: 36.9 

Code: 86628-1 Heart 

Rate 1: 64 bpm  Height: 5'2"    Respiratory Rate: 20 bpm SpO2: 96%       Tempera

ture: 36.5

(C) / 97.7 (F)                          Weight: 205 lbs 

 

                2017      Blood Pressure 1: 142/68 Code: 8480-6 Heart Rate

 1: 88 bpm 

Respiratory Rate: 18 bpm SpO2: 98%           Temperature: 36.3 (C) / 97.3 (F) We

ight: 209 

lbs 

 

                2016      Blood Pressure 1: 130/78 Code: 8480-6 Heart Rate

 1: 68 bpm 

Respiratory Rate: 20 bpm SpO2: 95%           Temperature: 36.4 (C) / 97.6 (F) We

ight: 212 

lbs 

 

                2016      Blood Pressure 1: 122/64 Code: 8480-6 BMI: 39.1 

Code: 61541-2 Heart 

Rate 1: 76 bpm      Height: 5'2"        Respiratory Rate: 20 bpm Temperature: 36

.8 (C) / 

98.2 (F)                                Weight: 217 lbs 

 

                2016      Blood Pressure 1: 144/70 Code: 8480-6 BMI: 39.4 

Code: 70549-0 Heart 

Rate 1: 76 bpm      Height: 5'2"        Respiratory Rate: 20 bpm Temperature: 36

.6 (C) / 

97.9 (F)                                Weight: 219 lbs 

 

                2015      Blood Pressure 1: 152/60 Code: 8480-6 BMI: 39.6 

Code: 46024-5 Heart 

Rate 1: 84 bpm      Height: 5'2"        Respiratory Rate: 20 bpm Temperature: 37

.0 (C) / 

98.6 (F)                                Weight: 220 lbs 

 

                2015      Blood Pressure 1: 146/76 Code: 8480-6 BMI: 39.8 

Code: 96154-2 Heart 

Rate 1: 88 bpm      Height: 5'2"        Respiratory Rate: 20 bpm Temperature: 37

.0 (C) / 

98.6 (F)                                Weight: 221 lbs 

 

                2015      Blood Pressure 1: 132/70 Code: 8480-6 BMI: 39.1 

Code: 85462-4 Heart 

Rate 1: 88 bpm      Height: 5'2"        Respiratory Rate: 20 bpm Temperature: 36

.4 (C) / 

97.6 (F)                                Weight: 217 lbs 

 

                2015      Blood Pressure 1: 132/66 Code: 8480-6 BMI: 39.9 

Code: 42515-0 Heart 

Rate 1: 72 bpm      Height: 5'2"        Respiratory Rate: 20 bpm Temperature: 36

.9 (C) / 

98.4 (F)                                Weight: 218 lbs 

 

                2015      Blood Pressure 1: 136/80 Code: 8480-6 Heart Rate

 1: 76 bpm 

Respiratory Rate: 20 bpm  Temperature: 36.7 (C) / 98.0 (F) Weight: 224 lbs 

 

                2015      Blood Pressure 1: 134/78 Code: 8480-6 BMI: 39.7 

Code: 85942-8 Heart 

Rate 1: 84 bpm      Height: 5'2"        Respiratory Rate: 20 bpm Temperature: 36

.7 (C) / 

98.0 (F)                                Weight: 217 lbs 

 

                2014      Blood Pressure 1: 146/78 Code: 8480-6 BMI: 39.5 

Code: 43185-0 Heart 

Rate 1: 82 bpm      Height: 5'2"        Respiratory Rate: 18 bpm Temperature: 35

.6 (C) / 

96.1 (F)                                Weight: 216 lbs 

 

                2014      Blood Pressure 1: 134/70 Code: 8480-6 BMI: 37.9 

Code: 81251-0 Heart 

Rate 1: 80 bpm      Height: 5'3"        Respiratory Rate: 20 bpm Temperature: 36

.8 (C) / 

98.2 (F)                                Weight: 214 lbs 

 

                2014      Blood Pressure 1: 132/70 Code: 8480-6 BMI: 37.6 

Code: 27698-0 Heart 

Rate 1: 80 bpm      Height: 5'3"        Respiratory Rate: 20 bpm Temperature: 36

.8 (C) / 

98.3 (F)                                Weight: 212 lbs 

 

                2014      Blood Pressure 1: 116/74 Code: 8480-6 Heart Rate

 1: 68 bpm 

Respiratory Rate: 20 bpm  Temperature: 36.2 (C) / 97.1 (F) Weight: 212 lbs 

 

                10/15/2013      Blood Pressure 1: 132/82 Code: 8480-6 BMI: 37.4 

Code: 32417-0 Heart 

Rate 1: 76 bpm      Height: 5'3"        Respiratory Rate: 20 bpm Temperature: 36

.7 (C) / 

98.0 (F)                                Weight: 211 lbs 

 

                    2013          Blood Pressure 1: 130/76 Code: 8480-6 He

art Rate 1: 78 bpm

 

                2013      Blood Pressure 1: 140/82 Code: 8480-6 BMI: 36.8 

Code: 02763-6 Heart 

Rate 1: 66 bpm      Height: 5'3"        Respiratory Rate: 20 bpm Temperature: 36

.1 (C) / 

96.9 (F)                                Weight: 208 lbs 

 

                2013      Blood Pressure 1: 138/80 Code: 8480-6 BMI: 36.4 

Code: 65602-2 Heart 

Rate 1: 72 bpm      Height: 5'4"        Respiratory Rate: 20 bpm Temperature: 36

.7 (C) / 

98.0 (F)                                Weight: 212 lbs 

 

                2013      Blood Pressure 1: 124/70 Code: 8480-6 BMI: 36.9 

Code: 24871-1 Heart 

Rate 1: 60 bpm      Height: 5'4"        Temperature: 36.1 (C) / 97.0 (F) Weight:

 215 lbs 

 

                05/10/2013      Blood Pressure 1: 132/86 Code: 8480-6 BMI: 36.9 

Code: 38997-1 Heart 

Rate 1: 76 bpm      Height: 5'4"        Respiratory Rate: 20 bpm Temperature: 36

.8 (C) / 

98.2 (F)                                Weight: 215 lbs 

 

                2013      Blood Pressure 1: 134/82 Code: 8480-6 BMI: 36.6 

Code: 04733-6 Heart 

Rate 1: 72 bpm      Height: 5'4"        Respiratory Rate: 20 bpm Temperature: 36

.3 (C) / 

97.4 (F)                                Weight: 213 lbs 

 

                2012      Blood Pressure 1: 142/80 Code: 8480-6 BMI: 37.1 

Code: 59205-5 Heart 

Rate 1: 76 bpm      Height: 5'4"        Respiratory Rate: 20 bpm Temperature: 36

.8 (C) / 

98.3 (F)                                Weight: 216 lbs 

 

                10/23/2012      Blood Pressure 1: 128/68 Code: 8480-6 BMI: 37.6 

Code: 73000-6 Heart 

Rate 1: 72 bpm      Height: 5'4"        Respiratory Rate: 20 bpm Temperature: 36

.6 (C) / 

97.9 (F)                                Weight: 219 lbs 

 

                10/10/2012      Blood Pressure 1: 122/70 Code: 8480-6 Heart Rate

 1: 76 bpm 

Respiratory Rate: 20 bpm  Temperature: 36.7 (C) / 98.1 (F) Weight: 218 lbs 

 

                    2012          Blood Pressure 1: 132/80 Code: 8480-6 He

art Rate 1: 84 bpm

 

                2012      Blood Pressure 1: 128/78 Code: 8480-6 BMI: 38.1 

Code: 82148-0 Heart 

Rate 1: 84 bpm      Height: 5'4"        Respiratory Rate: 20 bpm Temperature: 36

.9 (C) / 

98.4 (F)                                Weight: 222 lbs 

 

                2012      Blood Pressure 1: 138/80 Code: 8480-6 BMI: 37.9 

Code: 84787-6 Heart 

Rate 1: 74 bpm      Height: 5'4"        Temperature: 36.1 (C) / 97.0 (F) Weight:

 221 lbs 

 

                2012      Blood Pressure 1: 126/78 Code: 8480-6 BMI: 38.4 

Code: 18061-9 Heart 

Rate 1: 72 bpm      Height: 5'4"        Respiratory Rate: 20 bpm Temperature: 36

.7 (C) / 

98.0 (F)                                Weight: 224 lbs 

 

                2012      Blood Pressure 1: 138/72 Code: 8480-6 BMI: 38.4 

Code: 04811-6 Heart 

Rate 1: 72 bpm      Height: 5'4"        Respiratory Rate: 20 bpm Temperature: 36

.6 (C) / 

97.9 (F)                                Weight: 224 lbs 

 

                2012      Blood Pressure 1: 122/78 Code: 8480-6 BMI: 38.8 

Code: 86998-7 Heart 

Rate 1: 88 bpm      Height: 5'4"        Respiratory Rate: 20 bpm Temperature: 36

.6 (C) / 

97.8 (F)                                Weight: 226 lbs 

 

                2012      Blood Pressure 1: 116/60 Code: 8480-6 BMI: 38.1 

Code: 18551-1 Heart 

Rate 1: 92 bpm      Height: 5'4"        Respiratory Rate: 20 bpm Temperature: 36

.8 (C) / 

98.2 (F)                                Weight: 222 lbs 

 

                2011      Blood Pressure 1: 118/62 Code: 8480-6 BMI: 37.9 

Code: 65734-2 Heart 

Rate 1: 80 bpm      Height: 5'4"        Temperature: 36.5 (C) / 97.7 (F) Weight:

 221 lbs 

 

                2011      Blood Pressure 1: 132/70 Code: 8480-6 Heart Rate

 1: 84 bpm 

Respiratory Rate: 20 bpm  Temperature: 36.7 (C) / 98.0 (F) Weight: 221 lbs 

 

                2011      Blood Pressure 1: 114/72 Code: 8480-6 BMI: 37.6 

Code: 05770-9 Heart 

Rate 1: 76 bpm      Height: 5'4"        Respiratory Rate: 20 bpm Temperature: 36

.8 (C) / 

98.3 (F)                                Weight: 219 lbs 

 

                    2011          Blood Pressure 1: 136/76 Code: 8480-6 Te

mperature: 36.0 (C) / 96.8 

(F)                                     Weight: 219 lbs 8 oz

 

                2011      Blood Pressure 1: 128/80 Code: 8480-6 Heart Rate

 1: 72 bpm 

Temperature: 36.3 (C) / 97.4 (F)        Weight: 218 lbs 

 

                2011      Blood Pressure 1: 126/70 Code: 8480-6 Heart Rate

 1: 72 bpm 

Temperature: 36.7 (C) / 98.0 (F)

 

                    2010          Blood Pressure 1: 126/78 Code: 8480-6 Te

mperature: 36.2 (C) / 97.1 

(F)                                     Weight: 217 lbs 8 oz

 

                2010      Blood Pressure 1: 116/70 Code: 8480-6 Heart Rate

 1: 72 bpm 

Temperature: 36.4 (C) / 97.6 (F)        Weight: 222 lbs 

 

                2010      Blood Pressure 1: 114/78 Code: 8480-6 Heart Rate

 1: 72 bpm 

Temperature: 37.1 (C) / 98.8 (F)        Weight: 225 lbs 

 

                2010      Blood Pressure 1: 128/78 Code: 8480-6 Heart Rate

 1: 76 bpm 

Temperature: 36.6 (C) / 97.8 (F)        Weight: 224 lbs 

 

                2010      Blood Pressure 1: 116/78 Code: 8480-6 Heart Rate

 1: 80 bpm 

Temperature: 36.4 (C) / 97.6 (F)        Weight: 226 lbs 

 

                2010      Blood Pressure 1: 120/70 Code: 8480-6 BMI: 38.3 

Code: 45440-3 Heart 

Rate 1: 88 bpm      Height: 5'5"        Temperature: 36.4 (C) / 97.6 (F) Weight:

 230 lbs 







Functional Status

No Functional Status data



Reason For Visit





                    Reason For Visit    Effective Dates     Notes

 

                    follow up           2020           

 

                    blood pressure check 2020           

 

                    high blood pressure 2020           

 

                    lab draw            2019           

 

                    lab draw            10/11/2019           

 

                    hearing loss        2019          left ear

 

                    follow up           06/10/2019           

 

                    follow up           2019          Patient has upcoming

 appointment with Dr Claire and carotid 

dopplers tomorrow

 

                    follow up           2018          Patient had heart ca

th on 10-30-18 and one of the bypass 

veins in spasming

 

                    follow up           2018          4 Week

 

                    follow up           10/02/2018           

 

                    follow up           2018           

 

                    high blood pressure 2018          Dr Claire has ordered

 holter monitor and 

hydralazine 50mg PRN

 

                    dizziness           2018          On  morning darren delvalle woke up and went to the bathroom 

and after lying down became very dizzy. Patient has hx of vertigo so didn't 
think anything of it. She usually just has them intermittently, but had more 
that day. While at [employer] began to feel very ill and became very shaky. She got 
home and sat in the recliner and had the jittery/nervous feeling. Later that 
night it finally resolved. Patient feels like she had a spell like this around 
the  and thought it was due to extreme heat exhaustion.

 

                    follow up           2018           

 

                    back pain           2018           

 

                    otalgia             2018           

 

                    back pain           2018           

 

                    injection(s)        10/06/2017          flu shot

 

                    lab draw            2017           

 

                    follow up           2017           

 

                    pelvic pain         2017          Patient states pain 

started in May with a "Constant Ache"

to left inguinal area. Patient states at that time pain was intermittent. Then, 
approximately 2nd week  of  pain worsened and radiates to left low back 
area.

 

                    lab draw            2017           

 

                    hyperglycemia       2017           

 

                    cough               2017           

 

                    otalgia             2016           

 

                    injection(s)        10/07/2016          Influenza

 

                    lab draw            2016           

 

                    constipation        2016           

 

                    flank pain          2016           

 

                    follow up           2015          3mo fwup

 

                    injection(s)        10/16/2015          Influenza

 

                    acute renal failure 09/15/2015           

 

                    lab draw            09/10/2015           

 

                    fatigue             2015           

 

                    otalgia             2015           

 

                    pain, limb          2015           

 

                    follow up           2015          [facility]

 

                    high blood pressure 2015           

 

                    injection(s)        10/17/2014          flu shot

 

                    sinusitis           2014           

 

                    high blood pressure 2014           

 

                    cough               2014          Was panfilo at Dr Tyree teixeira's office so he started he on amlodipine 

10mg but seems to be dragging her down. Patient decreased to 1/2 tablet this 
last week

 

                    lab draw            2014           

 

                    cough               2014           

 

                    cough               10/15/2013           

 

                    high blood pressure 2013           

 

                    follow up           2013          [facility]

 

                    dizziness           2013           

 

                    follow up           2013           

 

                    otalgia             05/10/2013           

 

                    breast complaint    2013           

 

                    lab draw            2012           

 

                    follow up           2012          2mo fwup

 

                    follow up           10/23/2012          2wk fwup

 

                    gas and bloating    10/10/2012          Dr Claire has her mon

itoring because was high in his 

office at last visit

 

                    injection(s)        2012           

 

                    dizziness           2012           

 

                    dizziness           2012           

 

                    lab draw            2012           

 

                    muscle weakness     2012          concerns of simvasta

tin with fatigue and muscle 

weakness

 

                    leg pain/sciatica   2012           

 

                    hip pain            2012           

 

                    back pain           2012          has tried ice pack, 

muscle relaxers, and moist heat

 

                    back pain           2012           

 

                    fatigue             2012          in hands/feet

 

                    otalgia             2011           

 

                    follow up           2011          2wk fwup

 

                    back pain           2011          thinks ulcer may hav

e returned

 

                    lab draw            2011           

 

                    dizziness           2011          Left ear and facial 

pain, right ear pain 

 

                    follow up           2011          1wk fwup

 

                    hip pain            2011          feels very draggy, f

atanish, BP 80/63, normally running 

100's/60's

 

                    chills              2010           

 

                    injection(s)        10/06/2010          flu shot

 

                    follow up           2010          6wk fwup, had HH rep

aired and is starting to feel better, 

started on reglan 10mg tid

 

                    follow up           2010          hosp fwup

 

                    follow up           2010          1mo fwup, saw Dr Danna du and hasn't gave cardiac clearance 

yet for HH repair, did have chemical stress test yesterday and waiting on 
results

 

                    follow up           2010          from EGD/colonoscopy

--has Hiatal Hernia and wants to do 

repair

 

                    follow up           2010           







Encounters





             Encounter    Performer    Location     Codes        Date

 

                                        () OFFICE/OUTPATIENT VISIT EST

Diagnosis: Essential (primary) hypertension[ICD10: I10]

Diagnosis: Palpitations[ICD10: R00.2] Akanksha EWING                    CPT-4: 19621              2020

 

                                        (50706) OFFICE/OUTPATIENT VISIT EST

Diagnosis: Essential hypertension[ICD10: I10]

Diagnosis: Palpitations[ICD10: R00.2]

Diagnosis: Stress reaction[ICD10: F43.0] [facility]            CPT-4: 55011              2020

 

                                        (90920) NURSE/OUTPATIENT VISIT EST

Diagnosis: Mixed hyperlipidemia[ICD10: E78.2] Akanksha DRIVER DO [facility]            CPT-4: 35176              2019

 

                                        (18527) NURSE/OUTPATIENT VISIT EST

Diagnosis: FLU VACCINE[ICD10: Z23] Akanksha KEY DO 

[facility]                       CPT-4: 53526              10/22/2019

 

                                        (34605) NURSE/OUTPATIENT VISIT EST

Diagnosis: Chronic kidney disease, stage 1[ICD10: N18.1] Akanksha DRIVER DO [facility] CPT-4: 82998              10/11/2019

 

                                        (59978) OFFICE/OUTPATIENT VISIT EST

Diagnosis: Acute serous otitis media, left ear[ICD10: H65.02] Nat DRIVER DO [facility] CPT-4: 37302              2019

 

                                        (23132) OFFICE/OUTPATIENT VISIT EST

Diagnosis: Essential (primary) hypertension[ICD10: I10]

Diagnosis: Coronary atherosclerosis due to calcified coronary lesion[ICD10: 
I25.84]

Diagnosis: Hypoglycemia, unspecified[ICD10: E16.2]

Diagnosis: Other fatigue[ICD10: R53.83]

Diagnosis: Urinary tract infection, site not specified[ICD10: N39.0] Akanksha DRIVER DO [facility] CPT-4: 82094        06/10/2019

 

                                        (26444) OFFICE/OUTPATIENT VISIT EST

Diagnosis: Essential (primary) hypertension[ICD10: I10]

Diagnosis: Gastro-esophageal reflux disease without esophagitis[ICD10: K21.9]

Diagnosis: Urinary tract infection, site not specified[ICD10: N39.0] Akanksha DRIVER DO [facility] CPT-4: 90452        2019

 

                                        ([id number]) OFFICE/OUTPATIENT VISIT EST

Diagnosis: Gastro-esophageal reflux disease without esophagitis[ICD10: K21.9]

Diagnosis: Epigastric pain[ICD10: R10.13] [facility]            CPT-4: 84131              2018

 

                                        ([id number]) OFFICE/OUTPATIENT VISIT EST

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

Diagnosis: Essential (primary) hypertension[ICD10: I10]

Diagnosis: Generalized anxiety disorder[ICD10: F41.1]

Diagnosis: Gastro-esophageal reflux disease without esophagitis[ICD10: K21.9] 

Akanksha DRIVER [facility] CPT-4: 32739        2018

 

                                        OFFICE/OUTPATIENT VISIT EST

Diagnosis: Gastro-esophageal reflux disease without esophagitis[ICD10: K21.9]

Diagnosis: Palpitations[ICD10: R00.2]

Diagnosis: Urinary tract infection, site not specified[ICD10: N39.0]

Diagnosis: Generalized anxiety disorder[ICD10: F41.1] Akanksha DRIVER [facility] CPT-4: 39441              10/02/2018

 

                                        (38027) NURSE/OUTPATIENT VISIT EST

Diagnosis: Coronary atherosclerosis due to calcified coronary lesion[ICD10: 
I25.84]

Diagnosis: Hypoglycemia, unspecified[ICD10: E16.2]

Diagnosis: Dizziness and giddiness[ICD10: R42]

Diagnosis: Occlusion and stenosis of bilateral carotid arteries[ICD10: I65.23] 

Akanksha DRIVER [facility] CPT-4: 33978        2018

 

                                        OFFICE/OUTPATIENT VISIT EST

Diagnosis: Epigastric pain[ICD10: R10.13]

Diagnosis: Generalized anxiety disorder[ICD10: F41.1]

Diagnosis: FLU VACCINE[ICD10: Z23] Akanksha SPENCER

[facility]                       CPT-4: 88649              2018

 

                                        (15279) OFFICE/OUTPATIENT VISIT EST

Diagnosis: Essential (primary) hypertension[ICD10: I10]

Diagnosis: Hypoglycemia, unspecified[ICD10: E16.2]

Diagnosis: Gastro-esophageal reflux disease without esophagitis[ICD10: K21.9] 

Akanksha DRIVER [facility] CPT-4: 22011        2018

 

                                        ([id number]) OFFICE/OUTPATIENT VISIT EST

Diagnosis: Dizziness and giddiness[ICD10: R42] Nat DRIVER [employer] [facility]            CPT-4: 16634              2018

 

                                        (48617) OFFICE/OUTPATIENT VISIT EST

Diagnosis: Cervicalgia[ICD10: M54.2]

Diagnosis: Other spondylosis with radiculopathy, cervical region[ICD10: M47.22] 

Akanksha DRIVER [facility] CPT-4: 50593        2018

 

                                        (14365) OFFICE/OUTPATIENT VISIT EST

Diagnosis: Hypoglycemia, unspecified[ICD10: E16.2]

Diagnosis: Other spondylosis with radiculopathy, cervical region[ICD10: M47.22]

Diagnosis: Vertigo of central origin, bilateral[ICD10: H81.43]

Diagnosis: Cervicocranial syndrome[ICD10: M53.0] Akanksha Villa DRIVER [employer] [facility]         CPT-4: 25893              2018

 

                                        (54273) OFFICE/OUTPATIENT VISIT EST

Diagnosis: Benign paroxysmal vertigo, bilateral[ICD10: H81.13]

Diagnosis: Otalgia, bilateral[ICD10: H92.03] Nat DRIVER [employer] [facility]            CPT-4: 66767              2018

 

                                        (65884) OFFICE/OUTPATIENT VISIT EST

Diagnosis: Low back pain[ICD10: M54.5]

Diagnosis: Radiculopathy, lumbosacral region[ICD10: M54.17]

Diagnosis: Left lower quadrant pain[ICD10: R10.32] Akanksha DE LA ROSA

OLIVIA SPENCER [employer] [facility]         CPT-4: 09597              2018

 

                                        (83430) OFFICE/OUTPATIENT VISIT EST

Diagnosis: FLU VACCINE[ICD10: Z23] Akanksha Xiangrodo KEVINQUELINE OLIVIA KEY [employer] 

[facility]                       CPT-4: 90173              10/06/2017

 

                                        (52920) OFFICE/OUTPATIENT VISIT EST

Diagnosis: Mixed hyperlipidemia[ICD10: E78.2]

Diagnosis: Essential (primary) hypertension[ICD10: I10]

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

Diagnosis: Impaired fasting glucose[ICD10: R73.01]

Diagnosis: Other fatigue[ICD10: R53.83] Akanksha DRIVER DO [facility]                    CPT-4: 87658              2017

 

                                        (44495) OFFICE/OUTPATIENT VISIT EST

Diagnosis: Pain in thoracic spine[ICD10: M54.6]

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 

Akanksha DRIVER DO [facility] CPT-4: 41796        2017

 

                                        (22563) OFFICE/OUTPATIENT VISIT EST

Diagnosis: Left lower quadrant pain[ICD10: R10.32]

Diagnosis: Low back pain[ICD10: M54.5] Akanksha HIGHTOWERLINE OLIVIA SYKES DO [facility]                    CPT-4: 85425              2017

 

                                        (13388) OFFICE/OUTPATIENT VISIT EST

Diagnosis: Mixed hyperlipidemia[ICD10: E78.2]

Diagnosis: Essential (primary) hypertension[ICD10: I10]

Diagnosis: Hypoglycemia, unspecified[ICD10: E16.2] Akanksha DRIVER [facility]         CPT-4: 06816              2017

 

                                        (95330) OFFICE/OUTPATIENT VISIT EST

Diagnosis: Impaired fasting glucose[ICD10: R73.01]

Diagnosis: Mastodynia[ICD10: N64.4]

Diagnosis: Mixed hyperlipidemia[ICD10: E78.2]

Diagnosis: Essential (primary) hypertension[ICD10: I10]

Diagnosis: Other fatigue[ICD10: R53.83] Akanksha Xiangndangela HIGHTOWERLINE SAramis 

VILLA

[facility]                    CPT-4: 65915              2017

 

                                        (82888) OFFICE/OUTPATIENT VISIT EST

Diagnosis: Acute upper respiratory infection, unspecified[ICD10: J06.9] Luba HIGHTOWERLINE OLIVIA DRIVER DO [facility] CPT-4: 56945        2017

 

                                        (59217) OFFICE/OUTPATIENT VISIT EST

Diagnosis: Acute mastoiditis without complications, right ear[ICD10: H70.001] 

Akankshatammy KEVINQUELINE SAramis VILLA CASE [facility] CPT-4: 08363        2016

 

                                        (46599) OFFICE/OUTPATIENT VISIT EST

Diagnosis: FLU VACCINE[ICD10: Z23] Akanksha BAUMAN SAramis TJ KEY [facility]                       CPT-4: 70222              10/07/2016

 

                                        (51590) OFFICE/OUTPATIENT VISIT EST

Diagnosis: Mixed hyperlipidemia[ICD10: E78.2]

Diagnosis: Essential (primary) hypertension[ICD10: I10]

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

Diagnosis: Impaired fasting glucose[ICD10: R73.01] Akanksha DRIVER [facility]         CPT-4: 27447              2016

 

                                        (99336) OFFICE/OUTPATIENT VISIT EST

Diagnosis: Constipation, unspecified[ICD10: K59.00] Akanksha DRIVER [facility] CPT-4: 44049              2016

 

                                        (50260) OFFICE/OUTPATIENT VISIT EST

Diagnosis: Essential (primary) hypertension[ICD10: I10]

Diagnosis: Mixed hyperlipidemia[ICD10: E78.2]

Diagnosis: Chronic kidney disease, stage 1[ICD10: N18.1] Akanksha DRIVER [facility] CPT-4: 36340              2016

 

                                        (50447) OFFICE/OUTPATIENT VISIT EST

Diagnosis: Muscle weakness (generalized)[ICD10: M62.81]

Diagnosis: Dizziness and giddiness[ICD10: R42]

Diagnosis: Essential (primary) hypertension[ICD10: I10]

Diagnosis: History of falling[ICD10: Z91.81] Akanksha Otoolendangela DRIVER [facility]            CPT-4: 28934              2015

 

                                        (80860) OFFICE/OUTPATIENT VISIT EST

Diagnosis: FLU VACCINE[ICD10: Z23] Akanksha KEY [facility]                       CPT-4: 09937              10/16/2015

 

                                        (26013) OFFICE/OUTPATIENT VISIT EST

Diagnosis: Acute renal failure[ICD9: 584.9]

Diagnosis: POLYURIA[ICD9: 788.42] Akanksha LANCE [facility]                       CPT-4: 77811              09/15/2015

 

                                        (69125) OFFICE/OUTPATIENT VISIT EST

Diagnosis: HYPERLIPIDEMIA NEC/NOS[ICD9: 272.4]

Diagnosis: HYPERTENSION[ICD9: 401.9]

Diagnosis: CAD[ICD9: 414.00]

Diagnosis: Hyperglycemia[ICD9: 790.29]

Diagnosis: MALAISE AND FATIGUE[ICD9: 780.79] Akanksha DRIVER DO [facility]            CPT-4: 72108              09/10/2015

 

                                        (66347) OFFICE/OUTPATIENT VISIT EST

Diagnosis: MALAISE AND FATIGUE[ICD9: 780.79]

Diagnosis: HYPOGLYCEMIA[ICD9: 251.2]

Diagnosis: Grieving[ICD9: 309.0]

Diagnosis: ABDOMINAL PAIN[ICD9: 789.00] Akanksha DRIVER DO [facility]                    CPT-4: 03590              2015

 

                                        OFFICE/OUTPATIENT VISIT EST

Diagnosis: CERUMEN IMPACTION[ICD9: 380.4]

Diagnosis: EUSTACHIAN TUBE DYSFUNCTION[ICD9: 381.81] Bertha FuentesLilayuri DRIVER DO [facility] CPT-4: 89505              2015

 

                                        (09996) OFFICE/OUTPATIENT VISIT EST

Diagnosis: Leg pain[ICD9: 729.5]

Diagnosis: SUPERFIC PHLEBITIS-LEG[ICD9: 451.0] Akanksha DRIVER [employer] [facility]            CPT-4: 63739              2015

 

                                        (09094) OFFICE/OUTPATIENT VISIT EST

Diagnosis: Thoracic back pain[ICD9: 724.1]

Diagnosis: SPASM OF MUSCLE[ICD9: 728.85] Akanksha DRIVER DO [facility]            CPT-4: 62602              2015

 

                                        ([id number]) OFFICE/OUTPATIENT VISIT EST

Diagnosis: DIZZINESS/VERTIGO[ICD9: 780.4]

Diagnosis: Benign positional vertigo[ICD9: 386.11]

Diagnosis: HYPERTENSION[ICD9: 401.9]

Diagnosis: Suspicious nevus[ICD9: 238.2] Akanksha DRIVER DO [facility]            CPT-4: 53182              2015

 

                                        (09213) OFFICE/OUTPATIENT VISIT EST

Diagnosis: FLU VACCINE[ICD10: Z23] Akanksha KEY DO 

[facility]                       CPT-4: 78278              10/17/2014

 

                                        OFFICE/OUTPATIENT VISIT EST

Diagnosis: SINUSITIS, ACUTE[ICD9: 461.9]

Diagnosis: EUSTACHIAN TUBE DYSFUNCTION[ICD9: 381.81] Bertha DRIVER [facility] CPT-4: 29749              2014

 

                                        (71332) OFFICE/OUTPATIENT VISIT EST

Diagnosis: DIZZINESS/VERTIGO[ICD9: 780.4]

Diagnosis: HYPERTENSION[ICD9: 401.9] Akanksha OTOOLE

[facility]                       CPT-4: 98166              2014

 

                                        (95674) OFFICE/OUTPATIENT VISIT EST

Diagnosis: HYPERTENSION[ICD9: 401.9]

Diagnosis: MALAISE AND FATIGUE[ICD9: 780.79]

Diagnosis: SINUSITIS, ACUTE[ICD9: 461.9] [facility]            CPT-[facility]: 63329              2014

 

                                        (81488) OFFICE/OUTPATIENT VISIT EST

Diagnosis: HYPERLIPIDEMIA NEC/NOS[ICD9: 272.4]

Diagnosis: HYPERTENSION[ICD9: 401.9]

Diagnosis: B12 DEFIC ANEMIA NEC[ICD9: 281.1]

Diagnosis: HYPOGLYCEMIA[ICD9: 251.2] Akanksha OTOOLE

[facility]                       CPT-4: 35676              2014

 

                                        OFFICE/OUTPATIENT VISIT EST

Diagnosis: COUGH[ICD9: 786.2] Bertha DRIVER [facility] 

CPT-4: 49613                            2014

 

                                        OFFICE/OUTPATIENT VISIT EST

Diagnosis: COUGH[ICD9: 786.2]

Diagnosis: URI, ACUTE[ICD9: 465.9] Bertha SPENCER

[facility]                       CPT-4: 24096              10/15/2013

 

                                        (15006) OFFICE/OUTPATIENT VISIT EST

Diagnosis: HYPERTENSION[ICD9: 401.9]

Diagnosis: CEPHALGIA[ICD9: 784.0]

Diagnosis: ANXIETY STATE NOS[ICD9: 300.00]

Diagnosis: FLU VACCINE[ICD9: V04.81] [facility]                       CPT-4: 61054              2013

 

                                        (78783) OFFICE/OUTPATIENT VISIT EST

Diagnosis: DIZZINESS/VERTIGO[ICD9: 780.4]

Diagnosis: SINUSITIS, ACUTE[ICD9: 461.9]

Diagnosis: Benign positional vertigo[ICD9: 386.11] Akanksha Xiangrodo KEVIN

FELECIATAMMY

SAramis VILLA [facility]         CPT-4: 85460              2013

 

                                        OFFICE/OUTPATIENT VISIT EST

Diagnosis: OTITIS MEDIA NOS[ICD9: 382.9] Akanksha Otoolendangela HIGHTOWERLINE SAramis

 

VILLA [facility]            CPT-4: 52388              2013

 

                                        OFFICE/OUTPATIENT VISIT EST

Diagnosis: Perforation of ear drum[ICD9: 384.20]

Diagnosis: SINUSITIS, ACUTE[ICD9: 461.9] Akanksha Otoolerodo        AKANKSHA SAramis

 

VILLA [facility]            CPT-4: 46443              05/10/2013

 

                                        (46257) OFFICE/OUTPATIENT VISIT EST

Diagnosis: Mastalgia[ICD9: 611.71] Akanksha Xiangrodo        AKANKSHA SAramis TJ KEY [facility]                       CPT-4: 56220              2013

 

                                        (89801) OFFICE/OUTPATIENT VISIT EST

Diagnosis: HYPERLIPIDEMIA NEC/NOS[ICD9: 272.4]

Diagnosis: HYPERTENSION[ICD9: 401.9]

Diagnosis: DIZZINESS/VERTIGO[ICD9: 780.4]

Diagnosis: Hyperglycemia[ICD9: 790.29]

Diagnosis: MALAISE AND FATIGUE[ICD9: 780.79] Akankshazina TEIXEIRA SAramis 

VILL[facility]            CPT-4: 93948              2012

 

                                        (13274) OFFICE/OUTPATIENT VISIT EST

Diagnosis: EUSTACHIAN TUBE DYSFUNCTION[ICD9: 381.81]

Diagnosis: DIZZINESS/VERTIGO[ICD9: 780.4]

Diagnosis: ABDOMINAL PAIN[ICD9: 789.00]

Diagnosis: GERD[ICD9: 530.81]

Diagnosis: CERUMEN IMPACTION[ICD9: 380.4] Akanksha Xiangrodo BAUMAN S

Aramis 

[facility]            CPT-4: 84655              2012

 

                                        OFFICE/OUTPATIENT VISIT EST

Diagnosis: ABDOMINAL PAIN[ICD9: 789.00]

Diagnosis: DYSPEPSIA[ICD9: 536.8] Akanksha BAKER XIANGLORNA LANCE [facility]                       CPT-4: 70322              10/23/2012

 

                                        (06673) OFFICE/OUTPATIENT VISIT EST

Diagnosis: ABDOMINAL PAIN[ICD9: 789.00]

Diagnosis: DYSPEPSIA[ICD9: 536.8]

Diagnosis: IBS[ICD9: 564.1] Akanksha DRIVER [facility] CPT

-

4: 47990                                10/10/2012

 

                                        OFFICE/OUTPATIENT VISIT EST

Diagnosis: DIZZINESS/VERTIGO[ICD9: 780.4]

Diagnosis: HYPOGLYCEMIA[ICD9: 251.2] kAanksha MEJIA [facility]                       CPT-4: 48929              2012

 

                                        (23891) OFFICE/OUTPATIENT VISIT EST

Diagnosis: URINARY TRACT INFECTION[ICD9: 599.0] Akanksha DRIVER [facility]            CPT-4: 82747              2012

 

                                        (05835) OFFICE/OUTPATIENT VISIT EST

Diagnosis: HYPERLIPIDEMIA NEC/NOS[ICD9: 272.4]

Diagnosis: HYPERTENSION[ICD9: 401.9]

Diagnosis: MALAISE AND FATIGUE[ICD9: 780.79]

Diagnosis: DIZZINESS/VERTIGO[ICD9: 780.4] Akanksha [facility]            CPT-4: 98593              2012

 

                                        (77341) OFFICE/OUTPATIENT VISIT EST

Diagnosis: DIZZINESS/VERTIGO[ICD9: 780.4]

Diagnosis: MALAISE AND FATIGUE[ICD9: 780.79]

Diagnosis: HYPERTENSION[ICD9: 401.9] Akanksha MEJIA [facility]                       CPT-4: 95259              2012

 

                                        OFFICE/OUTPATIENT VISIT EST

Diagnosis: LUMB/LUMBOSAC DISC DEGEN[ICD9: 722.52]

Diagnosis: Radiculopathy of leg[ICD9: 724.4]

Diagnosis: SACROILIITIS NEC[ICD9: 720.2]

Diagnosis: SPINAL ENTHESOPATHY[ICD9: 720.1] Akanksha DRIVER [facility]            CPT-4: 46288              2012

 

                                        (25256) OFFICE/OUTPATIENT VISIT EST

Diagnosis: PAIN, LOWER BACK[ICD9: 724.2]

Diagnosis: SPASM OF MUSCLE[ICD9: 728.85]

Diagnosis: SCIATICA[ICD9: 724.3]

Diagnosis: Lumbar degenerative disc disease[ICD9: 722.52] Akanksha DRIVER DO [facility] CPT-4: 05715              2012

 

                                        (82922) OFFICE/OUTPATIENT VISIT EST

Diagnosis: PAIN IN THORACIC SPINE[ICD9: 724.1]

Diagnosis: SPASM OF MUSCLE[ICD9: 728.85] Akanksha DRIVER DO [facility]            CPT-4: 64497              2012

 

                                        (95948) OFFICE/OUTPATIENT VISIT EST

Diagnosis: PAIN, LOWER BACK[ICD9: 724.2]

Diagnosis: SPASM OF MUSCLE[ICD9: 728.85]

Diagnosis: Sacroiliac dysfunction[ICD9: 739.4]

Diagnosis: Lumbar degenerative disc disease[ICD9: 722.52] Akanksha DRIVER DO [facility] CPT-4: 32957              2012

 

                                        OFFICE/OUTPATIENT VISIT EST

Diagnosis: MALAISE AND FATIGUE[ICD9: 780.79]

Diagnosis: HYPOTENSION[ICD9: 458.9]

Diagnosis: CAD[ICD9: 414.00] Akanksha DRIVER DO [facility] 

CPT-4: 58105                            2012

 

                                        OFFICE/OUTPATIENT VISIT EST

Diagnosis: SINUSITIS, ACUTE[ICD9: 461.9]

Diagnosis: PHARYNGITIS, ACUTE[ICD9: 462]

Diagnosis: CERUMEN IMPACTION[ICD9: 380.4] [facility]ha [facility]            CPT-4: 25748              2011

 

                                        OFFICE/OUTPATIENT VISIT EST

Diagnosis: PAIN IN THORACIC SPINE[ICD9: 724.1]

Diagnosis: GERD[ICD9: 530.81]

Diagnosis: DIZZINESS/VERTIGO[ICD9: 780.4] Akanksha [facility]            CPT-4: 52986              2011

 

                OFFICE/OUTPATIENT VISIT EST Akanksha MEJIA DO [facility] CPT-

4: 01763                                2011

 

                    (08166) OFFICE/OUTPATIENT VISIT EST Akanksha DRIVER DO 

[facility]                       CPT-4: 15716              2011

 

                                        (99537) OFFICE/OUTPATIENT VISIT, EST

Diagnosis: PAIN, LOWER BACK[ICD9: 724.2] Akanksha BAUMAN SAramis

 

[facility]            CPT-4: 24095              2011

 

                    (31577) OFFICE/OUTPATIENT VISIT, EST Akanksha DE LA ROSA S. [facility]

[facility]                       CPT-4: 43252              2011

 

                    (73774) OFFICE/OUTPATIENT VISIT, EST Akanksha DE LA ROSA S. [facility] DO

[facility]                       CPT-4: 81966              2010

 

                    (09847) OFFICE/OUTPATIENT VISIT, EST Akanksha IZQUIERDOLINE S. [facility] DO

[facility]                       CPT-4: 62391              2010

 

                    (00216) OFFICE/OUTPATIENT VISIT, EST Akanksha DE LA ROSA S. [facility] DO

[facility]                       CPT-4: 73666              2010

 

                    (24554) OFFICE/OUTPATIENT VISIT, EST Akanksha DE LA ROSA S. [facility]END[facility] DO

[facility]                       CPT-4: 36770              2010

 

                    (23887) OFFICE/OUTPATIENT VISIT, EST Akanksha DE LA ROSA S. [facility] DO

[facility]                       CPT-4: 79954              2010

 

                    (00887) OFFICE/OUTPATIENT VISIT, EST Akanksha DE LA ROSA S. OREND[facility] DO

[facility]                       CPT-4: 80131              2010







Plan of Care





             Planned Activity Notes        Codes        Status       Date

 

                          Visit Diagnosis Plan: Essential (primary) hypertension

 Discussion: Improving 

with higher dose of metoprolol Recheck 2weeks

                                        ICD-9 : 401.9

ICD-10 : I10

                                                    2020

 

                          Visit Diagnosis Plan: Palpitations Discussion: Continu

e higher dose of 

metoprolol

                                        ICD-9 : 785.1

ICD-10 : R00.2

                                                    2020

 

                                        Appointment: Akanksha Driver

WPtel:[phone] [id number]

              2020--moved up to 20 at 4pm (km)                 [place]

NCELED        2020

 

                          Visit Diagnosis Plan: Essential hypertension Discussio

n: Increase metoprolol to 

25mg q AM and 50mg po q pM Recheck 1 week

                                        ICD-9 : 401.9

ICD-10 : I10

                                                    2020

 

                          Visit Diagnosis Plan: Palpitations Discussion: Check C

BC, CMP, TSH

                                        ICD-9 : 785.1

ICD-10 : R00.2

                                                    2020

 

                                        Appointment: Akanksha Driver

WPtel:[phone]

                                        [address] [place] [address]                                              BP CHECK        2020

 

                                        Appointment: Akanksha Driver

WPtel:[phone]

                                        [address] [place] [address]                                              ACUTE ILLNESS   2020

 

                          Patient Education: metoprolol tartrate- [employer] [facility] [id number] 

[email or web address]/[email or web address]

                                        Completed           2020

 

                    Care Plan: X-RAY EXAM NECK SPINE 4/5VWS                     

LOINC : [id number]

                          Pending                   2020

 

                    Care Plan: X-RAY EXAM THORAC SPINE4/>VW                     

LOINC : 52327-6

                          Pending                   2020

 

                                        Appointment: Akanksha Driver

WPtel:[phone]

                                        [address] [place] [address]                                              LAB             2019

 

                                        Appointment: Akanksha Driver

WPtel:[phone]

                                        [address] [place], [place] [id number]

US                                              INJECTION       10/22/2019

 

             Patient Education: INFLUENZA VACCINE [facility]                           

Completed    10/22/2019

 

                                        Appointment: Akanksha Driver

WPtel:[phone]

                                        [address] [place], [place] [id number]

US                                              LAB             10/11/2019

 

                          Visit Diagnosis Plan: Acute serous otitis media, left 

ear Discussion: instructed

to use flonase and claritin daily for symptom relief. discussed with patient 
that if no improvement in 2 weeks, will need to follow up with ENT for audiology
exam. instructed to call office with any other symptoms such as otalgia, 
dysphagia, fever, etc.

                                        ICD-9 : 381.01

ICD-10 : H65.02

                                                    2019

 

                                        Appointment: Nat Lozoya

                                        [address] [place], [place] [id number]

                                              ACUTE ILLNESS   2019

 

                          Visit Diagnosis Plan: Hypoglycemia, unspecified Discus

madeleine: Monitor BS At least 

6 small meals a day each with protein

                                        ICD-9 : 251.2

ICD-10 : E16.2

                                                    06/10/2019

 

                          Visit Diagnosis Plan: Essential (primary) hypertension

 Discussion: Stable Check 

CMP

                                        ICD-9 : 401.9

ICD-10 : I10

                                                    06/10/2019

 

                          Visit Diagnosis Plan: Other fatigue Discussion: Check 

CBC, TSH

                                        ICD-9 : 780.79

ICD-10 : R53.83

                                                    06/10/2019

 

                          Visit Diagnosis Plan: Urinary tract infection, site no

t specified Discussion: 

Started an old abx prescription

                                        ICD-9 : 599.0

ICD-10 : N39.0

                                                    06/10/2019

 

                                        Appointment: Akanksha Driver

WPtel:[phone]

                                        [address] [place], [place] [id number]

[place]                                              FOLLOW UP       06/10/2019

 

                          Visit Diagnosis Plan: Urinary tract infection, site no

t specified Discussion: 

Macrobid 100mg po BID for 1 week

                                        ICD-9 : 599.0

ICD-10 : N39.0

                                                    2019

 

                          Visit Diagnosis Plan: Gastro-esophageal reflux disease

 without esophagitis 

Discussion: Stable on omeprazole

Follow Up: 3 months

                                        ICD-9 : 530.81

ICD-10 : K21.9

                                                    2019

 

                          Visit Diagnosis Plan: Essential (primary) hypertension

 Discussion: Stable Sees 

Dr. Clements this month

                                        ICD-9 : 401.9

ICD-10 : I10

                                                    2019

 

                                        Appointment: Akanksha Driver

WPtel:[phone]

                                        [address] [place], [place] [id number]

[place]                                              FOLLOW UP       2019

 

                          Patient Education: metoprolol tartrate- [employer] [facility] [id number] 

[email or web address]/resources/[email or web address]

                                        Completed           2019

 

                                        Appointment: Akanksha Driver

WPtel:[phone]

                                        [address] [place], [place] [address]

[place]                                              CANCELED        02/15/2019

 

                          Visit Diagnosis Plan: Gastro-esophageal reflux disease

 without esophagitis 

Discussion: Continue protonix and carafate Proceed with updated EGD

                                        ICD-9 : 530.81

ICD-10 : K21.9

                                                    2018

 

                          Visit Diagnosis Plan: Epigastric pain Discussion: [facility] CT abdomen/pelvis due to

ongoing abdominal pain

                                        ICD-9 : 789.06

ICD-10 : R10.13

                                                    2018

 

                                        Appointment: Akanksha Driver

WPtel:[phone]

                                        [facility]

[place]                                              FOLLOW UP       2018

 

                    Care Plan: CT PELVIS W/O DYE                     LOINC : 361

08-9

                          Pending                   2018

 

                    Care Plan: CT ABDOMEN W/O DYE                     LOINC : 36

103-0

                          Pending                   2018

 

                    Care Plan: Referral Order                     SNOMED-CT : 30

[id number]

                          Pending                   2018

 

                                        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

art disease of native coronary artery 

without angina pectoris                 Discussion: S/P cardiac cath--doing medi

vicki management 

with imdur and metoprolol increased Has fwup with cardiology next week Discussed
cardiac rehab

                                        ICD-9 : 414.00

ICD-10 : I25.10

                                                    2018

 

                          Visit Diagnosis Plan: Gastro-esophageal reflux disease

 without esophagitis 

Discussion: Continue protonix at BID dosing and carafate BID

Follow Up: 2 months

                                        ICD-9 : 530.81

ICD-10 : K21.9

                                                    2018

 

                          Visit Diagnosis Plan: Essential (primary) hypertension

 Discussion: Stable

                                        ICD-9 : 401.9

ICD-10 : I10

                                                    2018

 

                          Visit Diagnosis Plan: Generalized anxiety disorder Dis

cussion: Stable

                                        ICD-9 : 300.00

ICD-10 : F41.1

                                                    2018

 

                                        Appointment: Akanksha Driver

WPtel:[phone]

                                        [facility]

[place]                                              FOLLOW UP       2018

 

                          Visit Diagnosis Plan: Gastro-esophageal reflux disease

 without esophagitis 

Discussion: Continue protonix at BID dosing Continue carafate at q AC and HS 
dosing for 2 more weeks then decrease to BID dosing

Follow Up: 4 weeks

                                        ICD-9 : 530.81

ICD-10 : K21.9

                                                    10/02/2018

 

                          Visit Diagnosis Plan: Generalized anxiety disorder Dis

cussion: Increase xanax to

BID dosing especially with upcoming cardiac testing which is already making 
patient more anxious and worried

                                        ICD-9 : 300.00

ICD-10 : F41.1

                                                    10/02/2018

 

                          Visit Diagnosis Plan: Palpitations Discussion: Bobby

carltonchristian going to schedule 

Lexiscan

                                        ICD-9 : 785.1

ICD-10 : R00.2

                                                    10/02/2018

 

                          Visit Diagnosis Plan: Urinary tract infection, site no

t specified Discussion: 

Reculture urine

                                        ICD-9 : 599.0

ICD-10 : N39.0

                                                    10/02/2018

 

                                        Appointment: Akanksha Driver

WPtel:[phone]

                                        [address] [place] [address]                                              FOLLOW UP       10/02/2018

 

             Patient Education: Patient Medication Summary                      

     Completed    10/02/2018

 

                                        Appointment: Akanksha Driver

WPtel:[phone]

                                        [address] [place] [address]                                              LAB             2018

 

             Patient Education: Patient Medication Summary                      

     Completed    2018

 

                          Visit Diagnosis Plan: Generalized anxiety disorder Dis

cussion: Did discuss 

increased risk of benzodiazepines causing dementia but at this time will stay at
xanax 0.25mg po BID

                                        ICD-9 : 300.00

ICD-10 : F41.1

                                                    2018

 

                          Visit Diagnosis Plan: Epigastric pain Discussion: Cont

inue protonix and carafate

but make into a slurry Flu shot given Discussed EGD if symptoms persist

Follow Up: 2 weeks

                                        ICD-9 : 789.06

ICD-10 : R10.13

                                                    2018

 

                                        Appointment: Akanksha Driver

WPtel:[phone]

                                        [address] [place] [address]                                              FOLLOW UP       2018

 

             Patient Education: Patient Medication Summary                      

     Completed    2018

 

                          Visit Diagnosis Plan: Essential (primary) hypertension

 Discussion: Has 

hydralazine to use prn per cardiology Going to do 30 day holter monitor

                                        ICD-9 : 401.9

ICD-10 : I10

                                                    2018

 

                          Visit Diagnosis Plan: Hypoglycemia, unspecified Discus

madeleine: 6 small meals a 

day--each with protein Increase fluid intake

                                        ICD-9 : 251.2

ICD-10 : E16.2

                                                    2018

 

                          Visit Diagnosis Plan: Gastro-esophageal reflux disease

 without esophagitis 

Discussion: Change to protonix 40mg po BID

Follow Up: 1 months

                                        ICD-9 : 530.81

ICD-10 : K21.9

                                                    2018

 

                                        Appointment: Akanksha Driver

WPtel:[phone] [address]                                              ACUTE ILLNESS   2018

 

             Patient Education: Patient Medication Summary                      

     Completed    2018

 

                          Visit Diagnosis Plan: Dizziness and giddiness Discussi

on: blood work to be 

completed in office including cmp, cbc, troponin to rule out glucose 
intolerance, infection, dehydration. instructed patient that she needs to see 
her cardiologist sooner than oct and patient requested we contact dr. clements's 
office. will have front office make appt. patient has carotid doppler annually.

                                        ICD-9 : 780.4

ICD-10 : R42

                                                    2018

 

                                        Appointment: Nat Lozoya

                                        [address] [place] [address]                                              ACUTE ILLNESS   2018

 

             Patient Education: Patient Medication Summary                      

     Completed    2018

 

                          Visit Diagnosis Plan: Cervicalgia Discussion: Complete

 course of PT since 

symptoms improved Continue stretching exercises Notify if symptoms return or 
worsen

                                        ICD-9 : 723.1

ICD-10 : M54.2

                                                    2018

 

                                        Appointment: Akanksha Driver

WPtel:[phone]

                                        [facility] [address]                                              FOLLOW UP       2018

 

             Patient Education: Patient Medication Summary                      

     Completed    2018

 

                          Visit Diagnosis Plan: Vertigo of central origin, bilat

eral Discussion: Discussed

PT for vestibular therapy

                                        ICD-9 : 386.2

ICD-10 : H81.43

                                                    2018

 

                          Visit Diagnosis Plan: Other spondylosis with radiculop

athy, cervical region 

Discussion: Check MRI of cervical spine

                                        ICD-9 : 721.0

ICD-10 : M47.22

                                                    2018

 

                          Visit Diagnosis Plan: Hypoglycemia, unspecified Discus

madeleine: Eat something with 

protein every 2 hrs

                                        ICD-9 : 251.2

ICD-10 : E16.2

                                                    2018

 

                                        Appointment: Akanksha Driver

WPtel:[phone] [address] [facility]

                                                              2018

 

             Patient Education: Patient Medication Summary                      

     Completed    2018

 

                    Care Plan: MRI NECK SPINE W/O DYE                     [facility] 

: [id number]

                          Pending                   2018

 

                          Visit Diagnosis Plan: Benign paroxysmal vertigo, bilat

eral Discussion: patient 

has meclizine at home but states it makes her too tired. instructed patient to 
cut in half and take at night. if it doesn't cause too much drowsiness, 
instructed to take bid until feeling better. instructed to rtc wed if no 
improvement or worsening symptoms. instructed patient to increase fluid intake 
as well.

                                        ICD-9 : 386.11

ICD-10 : H81.13

                                                    2018

 

                          Visit Diagnosis Plan: Otalgia, bilateral Discussion: k

enalog 40 mg given to 

patient im. instructed patient to monitor blood sugar at home due to risk of 
increased sugar. instructed patient to rtc on wednesday if no improvement and 
may require antibiotic.

                                        ICD-9 : 388.70

ICD-10 : H92.03

                                                    2018

 

                                        Appointment: Nat Lozoya

                                        [address] [place] [address]                                              ACUTE ILLNESS   2018

 

             Patient Education: Patient Medication Summary                      

     Completed    2018

 

                          Visit Diagnosis Plan: Low back pain Discussion: Stretc

hes Topical aspercreme 

Tylenol 1 po TID

                                        ICD-9 : 724.2

ICD-10 : M54.5

                                                    2018

 

                          Visit Diagnosis Plan: Radiculopathy, lumbosacral regio

n Discussion: As above

                                        ICD-9 : 724.4

ICD-10 : M54.17

                                                    2018

 

                          Visit Diagnosis Plan: Left lower quadrant pain Discuss

ion: Culture urine Notify 

if worsens

                                        ICD-9 : 789.04

ICD-10 : R10.32

                                                    2018

 

                                        Appointment: Akanksha Driver

WPtel:[phone]

                                        [address] [place] [address]                                              ACUTE ILLNESS   2018

 

             Patient Education: Patient Medication Summary                      

     Completed    2018

 

                                        Appointment: Akanksha Driver

WPtel:[phone]

                                        [address] [place] [address]

[place]                                              INJECTION       10/06/2017

 

             Patient Education: Patient Medication Summary                      

     Completed    10/06/2017

 

                                        Appointment: Akanksha Driver

WPtel:[phone]

                                        [address] [place], [place] [id number]

[place]                                              LAB             2017

 

             Patient Education: Patient Medication Summary                      

     Completed    2017

 

                          Visit Diagnosis Plan: Other intervertebral disc degene

ration, lumbar region 

Follow Up: 3 months

                                        ICD-9 : 722.52

ICD-10 : M51.36

                                                    2017

 

                          Visit Diagnosis Plan: Pain in thoracic spine Discussio

n: PT has helped Continue 

stretches and walking [facility]

                                        ICD-9 : 724.1

ICD-10 : M54.6

                                                    2017

 

                                        Appointment: Akanksha Driver

WPtel:[phone]

                                        [address] [place], [place] [id number]

                                              FOLLOW UP       2017

 

             Patient Education: Patient Medication Summary                      

     Completed    2017

 

                          Visit Diagnosis Plan: Left lower quadrant pain Discuss

ion: Had CT scan of 

abdomen/pelvis in 2017 and normal colonoscopy in 2015

                                        ICD-9 : 789.04

ICD-10 : R10.32

                                                    2017

 

                          Visit Diagnosis Plan: Low back pain Discussion: Start 

PT for low back- Seems 

like abdominal pain is radiating from low back

Follow Up: 5 weeks

                                        ICD-9 : 724.2

ICD-10 : M54.5

                                                    2017

 

                                        Appointment: Akanksha Driver

WPtel:[phone]

                                        [address] [place], [place] [id number]

                                              ACUTE ILLNESS   2017

 

             Patient Education: Patient Medication Summary                      

     Completed    2017

 

                                        Appointment: Akanksha Driver

WPtel:[phone]

                                        [address] [place], [place] [id number]

[place]                                              LAB             2017

 

             Patient Education: Patient Medication Summary                      

     Completed    2017

 

                          Visit Diagnosis Plan: Mixed hyperlipidemia Discussion:

 Check lipids

                                        ICD-9 : 272.4

ICD-10 : E78.2

                                                    2017

 

                          Visit Diagnosis Plan: Mastodynia Discussion: Check Jace

ateral diagnostic 

mammogram with US

                                        ICD-9 : 611.71

ICD-10 : N64.4

                                                    2017

 

                          Visit Diagnosis Plan: Impaired fasting glucose Discuss

ion: Return in AM for CMP,

HbA1C Accuchecks daily Diet/Exercise discussed at length

                                        ICD-9 : 790.29

ICD-10 : R73.01

                                                    2017

 

                          Visit Diagnosis Plan: Other fatigue Discussion: Check 

CBC, TSH

                                        ICD-9 : 780.79

ICD-10 : R53.83

                                                    2017

 

                                        Appointment: Akanksha Drivertel:[phone]

                                        [address] [place] [address]              3/ confirmed `sl                 ACUTE ILLNESS   2017

 

             Patient Education: Patient Medication Summary                      

     Completed    2017

 

                    Care Plan: MAMMOGRAM SCREENING                     LOINC : 2

[id number]

                          Pending                   2017

 

                          Visit Diagnosis Plan: Acute upper respiratory infectio

n, unspecified Discussion:

Exam is reassuring Likely viral illness Supportive care reviewed and encouraged 
Follow up PRN

                                        ICD-9 : 465.9

ICD-10 : J06.9

                                                    2017

 

                                        Appointment: Luba Stevenson 

                                        [address] [place] [address]                                              ACUTE ILLNESS   2017

 

             Patient Education: Patient Medication Summary                      

     Completed    2017

 

                          Visit Plan:               Supportive care. Rest, Fluid

s, Tylenol/Motrin prn fever or 

bodyaches. Notify if worsening symptoms.

                                                            2016

 

                                        Appointment: Akanksha Drivertel:[phone]

                                        [address] [place] [address]                                              ACUTE ILLNESS   2016

 

             Patient Education: Patient Medication Summary                      

     Completed    2016

 

                                        Appointment: Akanksha Driver

WPtel:[phone]

                                        [address] [place] [address]

[place]                                              INJECTION       10/07/2016

 

             Patient Education: Patient Medication Summary                      

     Completed    10/07/2016

 

                                        Appointment: Akanksha Driver

WPtel:[phone]

                                        [address] [place] [address]                                              LAB             2016

 

             Patient Education: Patient Medication Summary                      

     Completed    2016

 

                          Visit Plan:               Add miralax daily Use daily 

probiotic and metamucil and push fluids

Notify if worsens/persists

                                                            2016

 

                                        Appointment: Akanksha Driver

WPtel:[phone]

                                        [address] [facility]

              16 confirmed ~sl                 ACUTE ILLNESS   2016

 

             Patient Education: Patient Medication Summary                      

     Completed    2016

 

                          Visit Plan:               No NSAIDs Kidney function di

scussed Discussed hydration Monitor lab

every 4months so will check CMP, HbA1C next month

                                                            2016

 

                                        Appointment: Akanksha Driver

WPtel:[phone]

                                        [address] [place] [address]              03/15 confirmed ~sl                 ACUTE ILLNESS   2016

 

             Patient Education: Patient Medication Summary                      

     Completed    2016

 

                          Visit Plan:               Vestibular exercises Refill 

flonase Strict low Na diet--discussed 

that needs to read labels Rx for walker with chair given due to muscle 
weakness/unsteadiness Has carotids and abdominal aorta and legs checked in 
January Check Chem 7

                                                            2015

 

                                        Appointment: Akanksha Driver

WPtel:[phone]

                                        [address] [place] [address]              12/15/15 appt confirmed cn                 FOLLOW UP       

 

             Patient Education: Patient Medication Summary                      

     Completed    2015

 

             Patient Education: Vertigo                           Completed    1

2015

 

                                        Appointment: Akanksha Driver

WPtel:[phone]

                                        [address] [place], [place] [id number]

[place]                                              INJECTION       10/16/2015

 

             Patient Education: Patient Medication Summary                      

     Completed    10/16/2015

 

                                        Appointment: Akanksha Driver

WPtel:[phone]

                                        [address] [address] [place], [place] [id number]

US                                              UA              09/15/2015

 

             Patient Education: Patient Medication Summary                      

     Completed    09/15/2015

 

                                        Referral: Landon De La Torre

WPtel:[phone]

#1 [facility] ROSA

[id number]

US              Referral                        Completed       2015

 

                                        Appointment: Akanksha Driver

WPtel:[phone]

                                        [address] [place], [place] [id number]

US                                              LAB             09/10/2015

 

             Patient Education: Patient Medication Summary                      

     Completed    09/10/2015

 

                          Visit Plan:               Check CMP, CBC, TSH, Free T4

, B12, Lipids, HbA1C Discussed 6 small 

meals a day each with protein Would likely benefit from antidepressant Update 
colonoscopy

                                                            2015

 

                                        Appointment: Akanksha Driver

WPtel:[phone]

                                        [address] [place], [place] [id number]              9/8/15 confirmed                 ACUTE ILLNESS   2015

 

             Patient Education: Patient Medication Summary                      

     Completed    2015

 

                          Visit Plan:               Cerumen flush with warm wate

r and peroxide - good results Resume 

Nasonex nasal spray daily Recommended Debrox earwax removal drops

                                                            2015

 

                                        Appointment: Bertha Mon

WPtel:[phone]

                                        [address] [place] [address]                                              ACUTE ILLNESS   2015

 

             Patient Education: Patient Medication Summary                      

     Completed    2015

 

                          Visit Plan:               Continue aspirin daily Add m

eloxicam for 1week Elevate and Ice Call

on 2015

 

                                        Appointment: Akanksha Driver

WPtel:[phone]

                                        [address] [place] [address]                                              ACUTE ILLNESS   2015

 

             Patient Education: Patient Medication Summary                      

     Completed    2015

 

                          Visit Plan:               Been using baclofen at [facility] and has helped some PT for next 

2-4weeks

                                                            2015

 

                                        Appointment: Akanksha Driver

WPtel:[phone]

                                        [address] [place], [place] [address]                                              Hospital Follow Up 2015

 

             Patient Education: Patient Medication Summary                      

     Completed    2015

 

                                        Appointment: Akanksha Driver

WPtel:[phone]

                                        [address] [place], [place] [id number]                                              LAB             2015

 

                                        Appointment: Akanksha Driver

WPtel:[phone]

                                        [address] [place] [address]              feeling better, weather -                 FOLLOW UP       

 

                                        Referral: Landon De La Torre

WPtel:[phone]

#1 [facility]              Referral                        Appointment Requested 2015

 

                          Visit Plan:               Continue exercise and curren

t meds See surgery for removal of right

arm lesion

                                                            2015

 

                                        Appointment: Akanksha Driver

WPtel:[phone]

                                        [address] [place] [address]                                              FOLLOW UP       2015

 

             Patient Education: Patient Medication Summary                      

     Completed    2015

 

                                        Appointment: Akanksha Driver

WPtel:[phone]

                                        [address] [place], [place] [address]

US                                              INJECTION       10/17/2014

 

             Patient Education: Patient Medication Summary                      

     Completed    10/17/2014

 

                                        Appointment: Bertha Mon

WPtel:[phone]

                                        [address] [place] [address]                                              ACUTE ILLNESS   2014

 

             Patient Education: Patient Medication Summary                      

     Completed    2014

 

                          Visit Plan:               Discussed that likely lumbar

 etiology for leg weakness Will change 

amlodopine to low dose dyazide and see if helps legs and inner ear

                                                            2014

 

                                        Appointment: Akanksha Driver

WPtel:[phone]

                                        [address] [place] [address]                                              FOLLOW UP       2014

 

             Patient Education: Patient Medication Summary                      

     Completed    2014

 

                          Visit Plan:               Ceftin and continue claritin

/flonase Decrease amlodopine to 2.5mg q

HS until fwup with Card due to weakness

                                                            2014

 

                                        Appointment: Akanksha Driver

WPtel:[phone]

                                        [address] [place] [address]                                              ACUTE ILLNESS   2014

 

             Patient Education: Patient Medication Summary                      

     Completed    2014

 

                                        Appointment: Akanksha Driver

WPtel:[phone]

                                        [address] [place], [place] [id number]                                              LAB             2014

 

             Patient Education: Patient Medication Summary                      

     Completed    2014

 

                                        Appointment: Bertha Mon

WPtel:[phone]

                                        [address] [place], [place] [id number]                                              ACUTE ILLNESS   2014

 

             Patient Education: Patient Medication Summary                      

     Completed    2014

 

                          Visit Plan:               Supportive care. Rest, Fluid

s, Tylenol prn fever or bodyaches. 

Notify if worsening symptoms. Ceftin

                                                            10/15/2013

 

                                        Appointment: Bertha Mon

WPtel:[phone]

                                        [address] [place] [address]                                              ACUTE ILLNESS   10/15/2013

 

             Patient Education: Patient Medication Summary                      

     Completed    10/15/2013

 

                                        Appointment: Akanksha Driver

WPtel:[phone]

                                        [address] [place] [address]                                              BP CHECK        2013

 

             Patient Education: Patient Medication Summary                      

     Completed    2013

 

                                        Appointment: Akanksha Driver

WPtel:[phone]

                                        [address] [place] [address]                                              ER Follow UP    2013

 

             Patient Education: Patient Medication Summary                      

     Completed    2013

 

                          Visit Plan:               Restart flonase BID Use mecl

izine 25mg q HS Vestibular exercises 

Claritin 10mg q AM See ENT if doesn't resolve

                                                            2013

 

                                        Appointment: Akanksha Driver

WPtel:[phone]

                                        [address] [place] [address]                                              ACUTE ILLNESS   2013

 

             Patient Education: Patient Medication Summary                      

     Completed    2013

 

                          Visit Plan:               Will continue to observe

                                                            2013

 

                                        Appointment: Akanksha Driver

WPtel:[phone]

                                        [address] [place] [address]                                              FOLLOW UP       2013

 

             Patient Education: Patient Medication Summary                      

     Completed    2013

 

                          Visit Plan:               ERx for Augmentin 875mg q12 

x 10 days and Floxin otic drops 5 gtts 

AD x7days Sample of Nasonex per pt request Discussed treatment (nasal saline, 
salt water gargles, keeping right ear clean and dry, for worsening go to UC on 
weekend, Tylenol/ibuprofen, etc.) RTC 2 weeks for ear recheck.

                                                            05/10/2013

 

                                        Appointment: Jill Jean 

WPtel:[phone]

                                        [address] [place] [address]                                              ACUTE ILLNESS   05/10/2013

 

             Patient Education: Patient Medication Summary                      

     Completed    05/10/2013

 

                          Visit Plan:               Decrease caffeine intake Marina

ck Bilateral Mammogram with US of left 

breast

                                                            2013

 

                                        Appointment: Akanksha Driver

WPtel:[phone]

                                        [address] [place] [address]                                              ACUTE ILLNESS   2013

 

             Patient Education: Patient Medication Summary                      

     Completed    2013

 

                                        Appointment: Kate Hall

WPtel:[phone]

                                        [address] [place] [address]              appt scheduled                  ACUTE ILLNESS   2013

 

                                        Appointment: Akanksha Driver

WPtel:[phone]

                                        [address] [place], [place] [address]                                              LAB             2012

 

             Patient Education: Patient Medication Summary                      

     Completed    2012

 

                          Visit Plan:               Continue off carafate and se

e how does Continue probiotic Add 

Vestibular exercises and use meclizine prn Continue Nasonex Check fasting lab 
including CMP, Lipids, CBC, TSH, Free T4, HbA1C

                                                            2012

 

                                        Appointment: Akanksha Driver

WPtel:[phone]

                                        [address] [place] [address]                                              FOLLOW UP       2012

 

             Patient Education: Patient Medication Summary                      

     Completed    2012

 

                          Visit Plan:               Continue carafate for 4more 

weeks at current dose then decrease to 

BID for 2wks then q HS

                                                            10/23/2012

 

                                        Appointment: Aknaksha Driver

WPtel:[phone]

                                        [address] [place], [place] [id number]                                              FOLLOW UP       10/23/2012

 

             Patient Education: Patient Medication Summary                      

     Completed    10/23/2012

 

                          Visit Plan:               Continue omeprazole Add Ellyn

fate 1gm po q AC Add daily probiotic

                                                            10/10/2012

 

                                        Appointment: Akanksha Driver

WPtel:[phone]

                                        [address] [place] [address]                                              ACUTE ILLNESS   10/10/2012

 

             Patient Education: Patient Medication Summary                      

     Completed    10/10/2012

 

                                        Appointment: Akanksha Driver

WPtel:[phone]

                                        [address] [place], [place] [id number]

US                                              INJECTION       2012

 

             Patient Education: Patient Medication Summary                      

     Completed    2012

 

                          Visit Plan:               Diabetic Diet Accuchecks BID

 alternating times Hold onglyza and 

Januvia for now and continue diet and exercise and weight loss and drew LOREDO 
Discussed hypoglycemia and snack of peanut butter and crackers with juice or 
milk

                                                            2012

 

                                        Appointment: Akanksha Drivertel:[phone]

                                        [address] [place] [address]                                              WORK IN         2012

 

             Patient Education: Patient Medication Summary                      

     Completed    2012

 

                                        Appointment: Akanksha Driver

WPtel:[phone]

                                        [address] [place] [address]              2012

 

             Patient Education: Patient Medication Summary                      

     Completed    2012

 

                                        Appointment: Akanksha Driver

WPtel:[phone]

                                        [address] [place] [address]                                              LAB             2012

 

             Patient Education: Patient Medication Summary                      

     Completed    2012

 

                                        Appointment: Akanksha Driver

WPtel:[phone]

                                        [address] [place] [address]                                              ACUTE ILLNESS   2012

 

             Patient Education: Patient Medication Summary                      

     Completed    2012

 

                          Visit Plan:               Injection to Right SI joint 

as above Pt will call in 3 days on pain

Has PT starting in 2wks.

                                                            2012

 

                                        Appointment: Akanksha Driver

WPtel:[phone]

                                        [address] [place] [address]                                              ACUTE ILLNESS   2012

 

             Patient Education: Patient Medication Summary                      

     Completed    2012

 

                          Visit Plan:               OMT done Start PT May need u

pdated MRI if need to consider epidural

Prednisone

                                                            2012

 

                                        Appointment: Akanksha Driver

WPtel:[phone]

                                        [address] [place] [address]                                              OMT             2012

 

             Patient Education: Patient Medication Summary                      

     Completed    2012

 

                          Visit Plan:               Flexeril and Vimovo OMT done

 Daily stretches

                                                            2012

 

                                        Appointment: Akanksha Driver

WPtel:[phone]

                                        [address] [place], [place] [id number]

                                              ACUTE ILLNESS   2012

 

             Patient Education: Patient Medication Summary                      

     Completed    2012

 

                          Visit Plan:               OMT done Daily stretches Vinod

st heat or biofreeze Right SI joint 

injection Call in 1week Vimovo BID

                                                            2012

 

                                        Appointment: Akanksha Driver

WPtel:[phone]

                                        [address] [place], [place] [address]                                              ACUTE ILLNESS   2012

 

             Patient Education: Patient Medication Summary                      

     Completed    2012

 

                                        Appointment: Akanksha Driver

WPtel:[phone]

                                        [address] [place], [place] [id number]

US                                              LAB             2012

 

             Patient Education: Patient Medication Summary                      

     Completed    2012

 

                          Visit Plan:               Decrease amlodopine to 1.25m

g daily Schedule with Cardiology 

Fasting lab in AM

                                                            2012

 

                                        Appointment: Akanksha Driver

WPtel:[phone]

                                        [address] [place] [address]                                              ACUTE ILLNESS   2012

 

             Patient Education: Patient Medication Summary                      

     Completed    2012

 

                          Visit Plan:               Saline nasal flushes prn. Ty

lenol/Motrin prn headache. Notify if 

persists/symptoms worsening. Cerumen removal from ears as above

                                                            2011

 

                                        Appointment: Akanksha Driver

WPtel:[phone]

                                        [address] [place], [place] [id number]

                                              ACUTE ILLNESS   2011

 

             Patient Education: Patient Medication Summary                      

     Completed    2011

 

                                        Appointment: Akanksha Driver

WPtel:[phone]

                                        [address] [place], [place] [id number]

US                                              INJECTION       10/05/2011

 

             Patient Education: Patient Medication Summary                      

     Completed    10/05/2011

 

                          Visit Plan:               Continue vimovo for 1more we

ek Increase Omeprazole to BID for 1mo 

then resume QD if stomach improved Vestibular exercises with meclizine q HS for 
next week

                                                            2011

 

                                        Appointment: Akanksha Driver

WPtel:[phone]

                                        [address] [place] [address]                                              FOLLOW UP       2011

 

             Patient Education: Patient Medication Summary                      

     Completed    2011

 

                                        Appointment: Akanksha Driver

WPtel:[phone]

                                        [address] [place] [address]                                              ACUTE ILLNESS   2011

 

             Patient Education: Patient Medication Summary                      

     Completed    2011

 

                                        Appointment: Akanksha Driver

WPtel:[phone]

                                        [address] [place] [address]                                              LAB             2011

 

             Patient Education: Patient Medication Summary                      

     Completed    2011

 

                          Visit Plan:               Saline nasal flushes prn. Ty

lenol/Motrin prn headache. Notify if 

persists/symptoms worsening. Add Veramyst Increase Omeprazole to 20mg po BID

                                                            2011

 

                                        Appointment: Akanksha Driver

WPtel:[phone]

                                        [address] [place] [address]                                              ACUTE ILLNESS   2011

 

             Patient Education: Patient Medication Summary                      

     Completed    2011

 

                                        Appointment: Akanksha Driver

WPtel:[phone]

                                        [address] [place] [address]                                              FOLLOW UP       2011

 

             Patient Education: Patient Medication Summary                      

     Completed    2011

 

                                        Appointment: Akanksha Driver

WPtel:[phone]

                                        [address] [place] [address]                                              ACUTE ILLNESS   2011

 

             Patient Education: Patient Medication Summary                      

     Completed    2011

 

                          Visit Plan:               Nasocourt sample given. Pt. 

will notify if symptoms are worse on 

Monday.

                                                            2010

 

                                        Appointment: Kate Hall

WPtel:[phone]

                                        [address] [place] [address]                                              ACUTE ILLNESS   2010

 

             Patient Education: Patient Medication Summary                      

     Completed    2010

 

                                        Appointment: Akanksha Driver

WPtel:[phone]

                                        [address] [place], [place] [id number]

US                                              INJECTION       10/06/2010

 

             Patient Education: Patient Medication Summary                      

     Completed    10/06/2010

 

                                        Appointment: Akanksha Driver

WPtel:[phone]

                                        [address] [place] [address]                                              FOLLOW UP       2010

 

             Patient Education: Patient Medication Summary                      

     Completed    2010

 

             Patient Education: Lexapro                           Completed    0

2010

 

                          Visit Plan:               May proceed with hiatal mykel

ia repair per Card. so will contact Dr. Cash's office to proceed with surgery Trial of Lexapro 5mg QD plus use 
xanax prn

                                                            2010

 

                                        Appointment: Akanksha Driver

WPtel:[phone]

                                        [address] [place] [address]                                              FOLLOW UP       2010

 

             Patient Education: Patient Medication Summary                      

     Completed    2010

 

                          Visit Plan:               B12 given Cont oral B12 and 

iron Fwup 1mo for B12 Proceed with 

hiatal hernia repair once card clearance

                                                            2010

 

                                        Appointment: Akanksha Driver

WPtel:[phone]

                                        [address] [place] [address]                                              FOLLOW UP       2010

 

             Patient Education: Patient Medication Summary                      

     Completed    2010

 

                                        Appointment: Akanksha Driver

WPtel:[phone]

                                        [address] [place] [address]                                              FOLLOW UP       2010

 

             Patient Education: Patient Medication Summary                      

     Completed    2010

 

                                        Appointment: Akanksha Driver

WPtel:[phone]

                                        [address] [place] [address]

[place]                                              LAB             2010

 

             Patient Education: Patient Medication Summary                      

     Completed    2010

 

                          Visit Plan:               Check fasting lab in AM--CMP

,Lipids, CBC, Vit D, TSH,FreeT4, B12

                                                            2010

 

                                        Appointment: Akanksha Driver

WPtel:[phone]

                                        [address] [place] [address]

[place]                                              FOLLOW UP       2010

 

             Patient Education: Patient Medication Summary                      

     Completed    2010

 

                                        Referral: Landon De La Torre

WPtel:[phone]

[address] [place] [id number]

[place]              Referral                        Appointment Requested  

 

                                        Referral: Landon De La Torre

WPtel:[phone]

[facility]

[id number]

[place]              Referral                        Initiated        







Instructions





                                        Comment

 

                                        . Supportive care.  Rest, Fluids, Tyleno

l/Motrin prn fever or bodyaches.  Notify

if worsening symptoms.



 

                                        . Add miralax daily

Use daily probiotic and metamucil and push fluids

Notify if worsens/persists

 

                                        . No NSAIDs

Kidney function discussed

Discussed hydration 

Monitor lab every 4months so will check CMP, HbA1C next month

 

                                        . Vestibular exercises

Refill flonase

Strict low Na diet--discussed that needs to read labels

Rx for walker with chair given due to muscle weakness/unsteadiness

Has carotids and abdominal aorta and legs checked in January

Check Chem 7



 

                                        . Check CMP, CBC, TSH, Free T4, B12, Lip

ids, HbA1C

Discussed 6 small meals a day each with protein

Would likely benefit from antidepressant 

Update colonoscopy

 

                                        . Cerumen flush with warm water and tyler

xide - good results 

Resume Nasonex nasal spray daily

Recommended Debrox earwax removal drops 

 

                                        . Continue aspirin daily

Add meloxicam for 1week

Elevate and Ice

Call on Monday

 

                                        . Been using baclofen at bedtime and has

 helped some

PT for next 2-4weeks



 

                                        . Continue exercise and current meds

See surgery for removal of right arm lesion

 

                                        . Discussed that likely lumbar etiology 

for leg weakness

Will change amlodopine to low dose dyazide and see if helps legs and inner ear

 

                                        . Ceftin and continue claritin/flonase

Decrease amlodopine to 2.5mg q HS until fwup with Card due to weakness

 

                                        .  Supportive care.  Rest, Fluids, Tylen

ol prn fever or bodyaches.  Notify if 

worsening symptoms.

Ceftin

 

                                        . Restart flonase BID

Use meclizine 25mg q HS

Vestibular exercises

Claritin 10mg q AM

See ENT if doesn't resolve

 

                                        . Will continue to observe



 

                                        . ERx for Augmentin 875mg q12 x 10 days 

and Floxin otic drops 5 gtts AD x7days

Sample of Nasonex per pt request

Discussed treatment (nasal saline, salt water gargles, keeping right ear clean 
and dry, for worsening go to [facility] on weekend, Tylenol/ibuprofen, etc.)

RTC 2 weeks for ear recheck.

 

                                        . Decrease caffeine intake

Check Bilateral Mammogram with US of left breast

 

                                        Left ear flushed with warm water and per

oxide with ear syringe and then last 

removed with currette--peroxide drops instilled.  Tolerated well, no 
complications, TM intact.. Continue off carafate and see how does

Continue probiotic

Add Vestibular exercises and use meclizine prn

Continue Nasonex

Check fasting lab including CMP, Lipids, CBC, TSH, Free T4, HbA1C

 

                                        . Continue carafate for 4more weeks at c

urrent dose then decrease to BID for 

2wks then q HS

 

                                        . Continue omeprazole

Add Carafate 1gm po q AC

Add daily probiotic



 

                                        . Diabetic Diet

Accuchecks BID alternating times

Hold onglyza and Januvia for now and continue diet and exercise and weight loss 
and moniter BS

Discussed hypoglycemia and snack of peanut butter and crackers with juice or 
milk

 

                                        Informed Consent obtainded.  Right SI yasir

int cleansed with alcohol and betadine 

and injected with 3cc 1%l lidocaine with 40mg depomedrol and 40mg kenalog, 
tolerated well with no complications, neosporin and bandage applied. Injection 
to Right SI joint as above

Pt will call in 3 days on pain

Has PT starting in 2wks.

 

                                        . OMT done

Start PT

May need updated MRI if need to consider epidural

Prednisone

 

                                        . Flexeril and Vimovo

OMT done

Daily stretches

 

                                        Right SI joint cleansed with alchohol an

d betadine and injected with 3cc 1% 

lidocaine with 40mg depomedrol and 40mg kenalog, tolerated well with no 
complications, neosporin and bandage applied. OMT done

Daily stretches

Moist heat or biofreeze

Right SI joint injection

Call in 1week

Vimovo BID

 

                                        . Decrease amlodopine to 1.25mg daily

Schedule with Cardiology

Fasting lab in AM

 

                                        .  Saline nasal flushes prn. Tylenol/Mot

rin prn headache.  Notify if 

persists/symptoms worsening.

Cerumen removal from ears as above



 

                                        . Continue vimovo for 1more week

Increase Omeprazole to BID for 1mo then resume QD if stomach improved

Vestibular exercises with meclizine q HS for next week

 

                                        . Saline nasal flushes prn. Tylenol/Motr

in prn headache.  Notify if 

persists/symptoms worsening.

Add Veramyst

Increase Omeprazole to 20mg po BID

 

                                        . Nasocourt sample given.  Pt. will noti

fy if symptoms are worse on Monday. 

 

                                        . May proceed with hiatal hernia repair 

per Card. so will contact Dr. 

Beckenhauer's office to proceed with surgery



Trial of Lexapro 5mg QD plus use xanax prn

 

                                        . B12 given

Cont oral B12 and iron

Fwup 1mo for B12

Proceed with hiatal hernia repair once card clearance

 

                                        . Check fasting lab in AM--CMP,Lipids, C

BC, Vit D, TSH,FreeT4, B12







Medical Equipment

No Medical Equipment data



Health Concerns Section

Health Concerns data not found



Goals Section

Goals data not found



Interventions Section

Interventions data not found



Health Status Evaluations/Outcomes Section

Health Status Evaluations/Outcomes data not found



Advance Directives

No Advance Directive data

## 2020-02-19 NOTE — XMS REPORT
CCD document using C-CDA

                             Created on: 2020



Leilani Ramírez

External Reference #: [id number]

: 1940

Sex: Female



Demographics





                          Address                   [address] [place]  [address]

 

                          Home Phone                [phone]

 

                          Preferred Language        Unknown

 

                          Marital Status            

 

                          Jewish Affiliation     Unknown

 

                          Race                      White

 

                          Ethnic Group              Not  or 





Author





                          Author                    Leilani Driver D.O.

 

                          Organization              [facility]

 

                          Address                   [address] [place]  [address]



 

                          Phone                     [phone]







Care Team Providers





                    Care Team Member Name Role                Phone

 

                    Akanksha Driver D.O. PP                  Unavailable

 

                          CCM                       Unavailable







Summary Purpose

Interface Exchange



Insurance Providers





             Payer name   Policy type / Coverage type Covered party ID Effective

 Begin Date 

Effective End Date

 

             [employer] [place] B [id number]  2018   

  Unknown

 

             [employer] Senior Supplemental Medicare Part B [id number]   [id number]    

 Unknown







Family history





Father



                          Diagnosis                 Age At Onset

 

                          Heart disease             Unknown







Mother



                          Diagnosis                 Age At Onset

 

                          Heart disease             Unknown

 

                          Cancer                    Unknown







Social History





                Social History Element Codes           Description     Effective

 Dates

 

                Tobacco history SNOMED CT: 331852551 Never smoker    2011

 

                Marital status  Unknown         Single          2010

 

                Number of children Unknown         9               2010







Allergies, Adverse Reactions, Alerts





           Substance  Reaction   Codes      Entered Date Inactivated Date Status

 

           _                     Unknown    2019 No Inactive Date Active

 

           * NO KNOWN FOOD ALLERGIES            Unknown    2010 No Inactiv

e Date Active

 

           _                     Unknown    2010 No Inactive Date Active

 

           QUINIDINE-QUININE ANALOGUES hives,     Unknown    2010 No Inact

diamante Date Active







Problems





                Condition       Codes           Effective Dates Condition Status

 

                          Essential (primary) hypertension ICD-9: 401.9

ICD-10: I10               2014                Active

 

                          Palpitations              ICD-9: 785.1

ICD-10: R00.2             10/02/2018                Active

 

                          Stress reaction           ICD-9: 308.9

ICD-10: F43.0             2020                Active

 

                          Mixed hyperlipidemia      ICD-9: 272.4

ICD-10: E78.2             2019                Active

 

                          FLU VACCINE               ICD-9: V04.81

ICD-10: Z23               2012                Active

 

                          Acute serous otitis media, left ear ICD-9: 381.01

ICD-10: H65.02            2019                Active

 

                          Coronary atherosclerosis due to calcified coronary les

ion ICD-9: 414.00

ICD-10: I25.84            2018                Active

 

                          Hypoglycemia, unspecified ICD-9: 251.2

ICD-10: E16.2             2017                Active

 

                          Other fatigue             ICD-9: 780.79

ICD-10: R53.83            2017                Active

 

                          Urinary tract infection, site not specified ICD-9: 599

.0

ICD-10: N39.0             10/02/2018                Active

 

                          Gastro-esophageal reflux disease without esophagitis I

CD-9: 530.81

ICD-10: K21.9             2018                Active

 

                          Epigastric pain           ICD-9: 789.06

ICD-10: R10.13            2018                Active

 

                          Atherosclerotic heart disease of native coronary arter

y without angina pectoris 

ICD-9: 414.00

ICD-10: I25.10            2018                Active

 

                          Generalized anxiety disorder ICD-9: 300.00

ICD-10: F41.1             2018                Active

 

                          Dizziness and giddiness   ICD-9: 780.4

ICD-10: R42               2014                Active

 

                          Occlusion and stenosis of bilateral carotid arteries I

CD-9: 433.10

ICD-10: I65.23            2018                Active

 

                          Cervicalgia               ICD-9: 723.1

ICD-10: M54.2             2018                Active

 

                          Other spondylosis with radiculopathy, cervical region 

ICD-9: 721.0

ICD-10: M47.22            2018                Active

 

                          Cervicocranial syndrome   ICD-9: 723.2

ICD-10: M53.0             2018                Active

 

                          Vertigo of central origin, bilateral ICD-9: 386.2

ICD-10: H81.43            2018                Active

 

                          Benign paroxysmal vertigo, bilateral ICD-9: 386.11

ICD-10: H81.13            2018                Active

 

                          Otalgia, bilateral        ICD-9: 388.70

ICD-10: H92.03            2018                Active

 

                          Left lower quadrant pain  ICD-9: 789.04

ICD-10: R10.32            2017                Active

 

                          Low back pain             ICD-9: 724.2

ICD-10: M54.5             2017                Active

 

                          Radiculopathy, lumbosacral region ICD-9: 724.4

ICD-10: M54.17            2018                Active

 

                          Impaired fasting glucose  ICD-9: 790.29

ICD-10: R73.01            2012                Active

 

                          Other intervertebral disc degeneration, lumbar region 

ICD-9: 722.52

ICD-10: M51.36            2017                Active

 

                          Pain in thoracic spine    ICD-9: 724.1

ICD-10: M54.6             2017                Active

 

                          Mastodynia                ICD-9: 611.71

ICD-10: N64.4             2017                Active

 

                          Acute upper respiratory infection, unspecified ICD-9: 

465.9

ICD-10: J06.9             2017                Active

 

                          Acute mastoiditis without complications, right ear ICD

-9: 383.00

ICD-10: H70.001           2016                Active

 

                          Constipation, unspecified ICD-9: 564.00

ICD-10: K59.00            2016                Active

 

                          Chronic kidney disease, stage 1 ICD-9: 585.1

ICD-10: N18.1             03/15/2016                Active

 

                          History of falling        ICD-9: V15.88

ICD-10: Z91.81            12/15/2015                Active

 

                          Muscle weakness (generalized) ICD-9: 780.79

ICD-10: M62.81            2015                Active

 

                Acute renal failure ICD-9: 584.9    2015      Active

 

                POLYURIA        ICD-9: 788.42   2015      Active

 

                CAD             ICD-9: 414.00   09/10/2015      Active

 

                Hyperglycemia   ICD-9: 790.29   2012      Active

 

                HYPERLIPIDEMIA NEC/NOS ICD-9: 272.4    09/10/2015      Active

 

                ABDOMINAL PAIN  ICD-9: 789.00   10/10/2012      Active

 

                Grieving        ICD-9: 309.0    2015      Active

 

                HYPOGLYCEMIA    ICD-9: 251.2    2012      Active

 

                MALAISE AND FATIGUE ICD-9: 780.79   2015      Active

 

                CERUMEN IMPACTION ICD-9: 380.4    2015      Active

 

                Leg pain        ICD-9: 729.5    2015      Active

 

                SUPERFIC PHLEBITIS-LEG ICD-9: 451.0    2015      Active

 

                SPASM OF MUSCLE ICD-9: 728.85   2015      Active

 

                Thoracic back pain ICD-9: 724.1    2015      Active

 

                Benign positional vertigo ICD-9: 386.11   2015      Active

 

                Suspicious nevus ICD-9: 238.2    2015      Active

 

                EUSTACHIAN TUBE DYSFUNCTION ICD-9: 381.81   2014      Acti

ve

 

                SINUSITIS, ACUTE ICD-9: 461.9    2014      Active

 

                DIZZINESS/VERTIGO ICD-9: 780.4    2014      Active

 

                HYPERTENSION    ICD-9: 401.9    2014      Active

 

                URI, ACUTE      ICD-9: 465.9    10/15/2013      Active

 

                CEPHALGIA       ICD-9: 784.0    2013      Active

 

                OTITIS MEDIA NOS ICD-9: 382.9    2013      Active

 

                Perforation of ear drum ICD-9: 384.20   05/10/2013      Active

 

                Mastalgia       ICD-9: 611.71   2013      Active

 

                DYSPEPSIA       ICD-9: 536.8    10/10/2012      Active

 

                IBS             ICD-9: 564.1    10/10/2012      Active

 

                FLU VACCINE     ICD-9: V04.81   2012      Active

 

                Radiculopathy of leg ICD-9: 724.4    2012      Active

 

                SCIATICA        ICD-9: 724.3    2012      Active

 

                Lumbar degenerative disc disease ICD-9: 722.52   2012     

 Active

 

                Sacroiliac dysfunction ICD-9: 739.4    2012      Active

 

                Hypertension    Unknown         2012      Active

 

                PAIN, LOWER BACK ICD-9: 724.2    2011      Active

 

                SPINAL ENTHESOPATHY ICD-9: 720.1    2011      Active

 

                Hypotension     ICD-9: 458.9    2011      Active

 

                SACROILIITIS NEC ICD-9: 720.2    2011      Active

 

                PHARYNGITIS, ACUTE ICD-9: 462      2010      Active

 

                URINARY TRACT INFECTION ICD-9: 599.0    2010      Active

 

                Heat exhaustion ICD-9: 992.5    2010      Active

 

                Esophagitis     ICD-9: 530.10   2010      Active

 

                Gastritis       ICD-9: 535.50   2010      Active

 

                GERD            ICD-9: 530.81   2010      Active

 

                Hiatal hernia   ICD-9: 553.3    2010      Active

 

                B12 DEFIC ANEMIA NEC ICD-9: 281.1    2010      Active

 

                Anxiety         Unknown         2010      Active

 

                Heart disease   Unknown         2010      Active

 

                Hyperlipidemia  Unknown         2010      Active

 

                ANXIETY STATE NOS ICD-9: 300.00   2010      Active

 

                DEPRESSIVE DISORDER NEC ICD-9: 311      2010      Active







Medications





          Medication Codes     Instructions Start Date Stop Date Status    Fill 

Instructions

 

                omeprazole 40 mg capsule,delayed release RxNorm: 397395  1 Capsu

le(s) Oral QD 

2020          10/17/2020          Active               

 

             simvastatin 40 mg tablet RxNorm: 662333 1 Tablet(s) Oral QD 

020   

10/17/2020                Active                     

 

                    metoprolol tartrate 25 mg tablet RxNorm: 448196      1 Table

t(s) Oral QAM and two 

tablets (50mg) in the evening 2020      Active           

 

                    metoprolol tartrate 25 mg tablet RxNorm: 492911      1 Table

t(s) Oral QAM and two 

tablets (50mg) in the evening 2020      Inactive         

 

                    Flonase Allergy Relief 50 mcg/actuation nasal spray,suspensi

on RxNorm: 3041095     2

Spray Nasal every night at bedtime 2020      Inactive     

    

 

           simvastatin 40 mg tablet RxNorm: 824102 1 Tablet(s) PO QD 2019 

Inactive                                 

 

                omeprazole 40 mg capsule,delayed release RxNorm: 241928  1 Capsu

le(s) Oral QD 

2019          Inactive             

 

                Xanax 0.25 mg tablet RxNorm: 845665  TAKE 1 TABLET BY MOUTH TWIC

E DAILY 

10/29/2019          No Stop Date        Active               

 

                omeprazole 40 mg capsule,delayed release RxNorm: 875367  1 Capsu

le(s) PO QD 

10/07/2019          2019          Inactive             

 

             metoprolol tartrate 25 mg tablet RxNorm: 955298 1 Tablet(s) PO BID 

2019                Inactive                   

 

                omeprazole 40 mg capsule,delayed release RxNorm: 751476  1 Capsu

le(s) PO QD 

2019          10/06/2019          Inactive             

 

                    Flonase Allergy Relief 50 mcg/actuation nasal spray,suspensi

on RxNorm: 8328339     2

[place] NASAL QHS 2019      Inactive         

 

           simvastatin 40 mg tablet RxNorm: 145297 1 Tablet(s) PO QD 2019 

Inactive                                 

 

           simvastatin 40 mg tablet RxNorm: 234615 1 Tablet(s) PO QD 2019 

Inactive                                 

 

                omeprazole 40 mg capsule,delayed release RxNorm: 036741  1 Capsu

le(s) PO QD 

2019          Inactive             

 

           simvastatin 40 mg tablet RxNorm: 704344 1 Tablet(s) PO QD 2019 

Inactive                                 

 

                omeprazole 40 mg capsule,delayed release RxNorm: 278508  1 Capsu

le(s) PO QD 

2019          Inactive             

 

             Bactrim  mg-160 mg tablet RxNorm: 470841 1 Tablet(s) PO BID 0

3/08/2019   

2019                Inactive                   

 

             Bactrim  mg-160 mg tablet RxNorm: 505374 1 Tablet(s) PO BID 0

3/08/2019   

2019                Inactive                   

 

             Macrobid 100 mg capsule RxNorm: 981508 1 Capsule(s) PO BID 20

                          Inactive                   

 

             metoprolol tartrate 25 mg tablet RxNorm: 874641 1 Tablet(s) PO BID 

2019                Inactive                   

 

                Xanax 0.25 mg tablet RxNorm: 001512  TAKE 1 TABLET BY MOUTH TWIC

E DAILY 

2018          10/28/2019          Inactive             

 

           Xanax 0.25 mg tablet RxNorm: 258699 1 Tablet(s) PO BID 2018 

Inactive                                 

 

                    Protonix 40 mg tablet,delayed release RxNorm: 423319      1 

Tablet(s) PO BID for 

stomach--replaces omeprazole 2018      Inactive         

 

             Carafate 1 gram tablet RxNorm: 786508 1 Tablet(s) PO AC & HS 2019                Inactive                   

 

           Xanax 0.25 mg tablet RxNorm: 751292 1 Tablet(s) PO BID 10/30/2018 12/

10/2018 

Inactive                                 

 

             Bactrim  mg-160 mg tablet RxNorm: 705046 1 Tablet(s) PO BID 1

   

10/08/2018                Inactive                   

 

             Bactrim  mg-160 mg tablet RxNorm: 952046 1 Tablet(s) PO BID 1

   

10/03/2018                Inactive                   

 

             Carafate 1 gram tablet RxNorm: 117529 1 Tablet(s) PO AC & HS 09/18/

2018   

10/17/2018                Inactive                   

 

                    Protonix 40 mg tablet,delayed release RxNorm: 398311      1 

Tablet(s) PO BID for 

stomach--replaces omeprazole 2018      Inactive         

 

                    Protonix 40 mg tablet,delayed release RxNorm: 279700      1 

Tablet(s) PO BID for 

stomach--replaces omeprazole 2018      Inactive         

 

                    fluticasone 50 mcg/actuation nasal spray,suspension RxNorm: 

2156607     2 Spray 

NASAL QD to each nostril 2018      Inactive         

 

             Nasonex 50 mcg/actuation Spray RxNorm: 4869600 2 [place] NASAL 2018                Inactive                   

 

                omeprazole 40 mg capsule,delayed release RxNorm: 579073  1 Capsu

le(s) PO QD 

2018          08/15/2018          Inactive             

 

                    simvastatin 40 mg tablet RxNorm: 883601      1 Tablet(s) PO 

QD TAKE 1 TABLET EVERY 

DAY             2018      Inactive         

 

             metoprolol tartrate 25 mg tablet RxNorm: 786306 1/2 Tablet(s) PO QD

 2018                Inactive                   

 

                simvastatin 40 mg tablet RxNorm: 732111  Tablet(s) TAKE 1 TABLET

 EVERY DAY 

2017          Inactive             

 

             metoprolol tartrate 25 mg tablet RxNorm: 548521 1/2 Tablet(s) PO QD

 2017                Inactive                   

 

                    Flonase 50 mcg/actuation nasal spray,suspension RxNorm: 1797

933     2 [place] [facility]AL 

BID             2017      Inactive         

 

                omeprazole 40 mg capsule,delayed release RxNorm: 341989  1 Capsu

le(s) PO QD 

2017          Inactive             

 

             metoprolol tartrate 25 mg tablet RxNorm: 843682 1/2 Tablet(s) PO QD

 04/10/2017   

2017                Inactive                   

 

                    ciprofloxacin 0.2 % ear drops in a dropperette RxNorm: 55625

6      4 Drop(s) OTIC TID

for 1 week      2016      Inactive         

 

           cefdinir 300 mg capsule RxNorm: 761817 2 Capsule(s) PO QD 2016 

Inactive                                 

 

                    omeprazole 40 mg capsule,delayed release RxNorm: 240062     

 TAKE 1 CAPSULE EVERY DAY

                2016      Inactive         

 

             simvastatin 40 mg tablet RxNorm: 870450 TAKE 1 TABLET EVERY DAY 2017                Inactive                   

 

                    Flonase 50 mcg/actuation nasal spray,suspension RxNorm: 1797

933     2 [place] [facility]AL 

BID             2015      Inactive         

 

             cefuroxime axetil 250 mg tablet RxNorm: 524883 1 Tablet(s) PO BID 0

2015                Inactive                   

 

             cefuroxime axetil 250 mg tablet RxNorm: 010807 1 Tablet(s) PO BID 0

2015                Inactive                   

 

                omeprazole 40 mg capsule,delayed release RxNorm: 903892  1 Capsu

le(s) PO QD 

2015          Inactive             

 

           meloxicam 7.5 mg tablet RxNorm: 244928 1 Tablet(s) PO QD 2015 0

2015 

Inactive                                 

 

                loratadine 10 mg tablet RxNorm: 173426  1 Tablet(s) PO QAM for a

llergies 

2014          Inactive             

 

                    Flonase 50 mcg/actuation nasal spray,suspension RxNorm: 8963

23      2 [place] NASAL BID

                2014      12/15/2015      Inactive         

 

           prednisone 20 mg tablet RxNorm: 825344 2 Tablet(s) PO BID 2014 

Inactive                                 

 

             Dyazide 37.5 mg-25 mg capsule RxNorm: 571955 1 Capsule(s) PO QAM 2014                Inactive                   

 

           Ceftin 500 mg tablet RxNorm: 529234 1 Tablet(s) PO BID 2014 

Inactive                                 

 

           Ceftin 500 mg tablet RxNorm: 386527 1 Tablet(s) PO BID 2014 

Inactive                                 

 

                    simvastatin 40 mg tablet RxNorm: 839570      Tablet(s) PO TA

KE ONE TABLET BY MOUTH 

EVERY DAY       2014      Inactive         

 

                    metoprolol tartrate 25 mg tablet RxNorm: 095898      Tablet(

s) PO TAKE ONE-HALF 

TABLET BY MOUTH EVERY DAY 2014      Inactive         

 

           Ceftin 500 mg tablet RxNorm: 473951 1 Tablet(s) PO BID 10/15/2013 10/

 

Inactive                                 

 

                loratadine 10 mg tablet RxNorm: 779872  1 Tablet(s) PO QAM for a

llergies 

2013          Inactive             

 

                    omeprazole 20 mg capsule,delayed release RxNorm: 159798     

 Capsule(s) PO TAKE ONE 

CAPSULE BY MOUTH TWICE DAILY 2013      Inactive         

 

                omeprazole 20 mg capsule,delayed release RxNorm: 374815  1 Capsu

le(s) PO BID 

2013          Inactive             

 

             Augmentin 875 mg-125 mg tablet RxNorm: 457439 1 Tablet(s) PO Q12H 0

5/10/2013   

2013                Inactive                   

 

             Floxin Otic Drops 1 bottle Drops RxNorm:      5 Drop(s) OTIC BID 05

/10/2013   

2013                Inactive                   

 

                    metoprolol tartrate 25 mg tablet RxNorm: 192222      Tablet(

s) PO TAKE ONE-HALF 

TABLET BY MOUTH EVERY DAY 2013      Inactive         

 

                    simvastatin 40 mg tablet RxNorm: 735553      Tablet(s) PO TA

KE ONE TABLET BY MOUTH 

EVERY DAY       2013      Inactive         

 

                lancets         RxNorm:         Misc Miscellaneous USE ONE TO CH

YOGI GLUCOSE EVERY DAY 

2013          Inactive             

 

             Carafate 1 gram tablet RxNorm: 662052 1 Tablet(s) PO AC & HS 2015                Inactive                   

 

           Flagyl 500 mg tablet RxNorm: 273283 1 Tablet(s) PO TID 10/10/2012 10/

 

Inactive                                 

 

             Carafate 1 gram tablet RxNorm: 965804 1 Tablet(s) PO AC & HS 10/10/

2012   

2012                Inactive                   

 

          One Touch Test strips RxNorm:   Miscellaneous QD 2012

 Inactive  

one touch ultra mini test strips

 

           Protonix 40 mg Tab RxNorm: 815369 1 Tablet(s) PO QD 2012 

Inactive                                 

 

           Protonix 40 mg Tab RxNorm: 926357 1 Tablet(s) PO QD 2012 10/09/

2012 

Inactive                                 

 

           prednisone 20 mg Tab RxNorm: 149382 1 Tablet(s) PO BID 2012 

Inactive                                 

 

             Flexeril 5 mg Tab RxNorm: 998767 1 Tablet(s) PO QHS for spasm 2012                Inactive                   

 

             Flexeril 5 mg Tab RxNorm: 282020 1 Tablet(s) PO QHS for spasm 2012                Inactive                   

 

                amlodipine 2.5 mg Tab RxNorm: 703364  1/2 Tablet(s) PO QD replac

es 5mg dose 

2012          Inactive             

 

           simvastatin 40 mg tablet RxNorm: 072610 1 Tablet(s) PO QD 2012 

Inactive                                 

 

             metoprolol tartrate 25 mg tablet RxNorm: 058596 1/2 Tablet(s) PO QD

 2012                Inactive                   

 

                omeprazole 20 mg capsule,delayed release RxNorm: 638069  1 Capsu

le(s) PO BID 

2012          Inactive             

 

           cefdinir 300 mg Cap RxNorm: 720292 2 Capsule(s) PO QD 2011 

Inactive                                 

 

           simvastatin 40 mg Tab RxNorm: 360373 1 Tablet(s) PO QD 2011 

Inactive                                 

 

             metoprolol tartrate 25 mg Tab RxNorm: 334188 1/2 Tablet(s) PO QD 10

/   

2012                Inactive                   

 

             metoprolol tartrate 25 mg Tab RxNorm: 724618 1/2 Tablet(s) PO QD 10

/   

10/24/2011                Inactive                   

 

           meclizine 25 mg Tab RxNorm: 8504893 1 Tablet(s) PO QHS 2011 

Inactive                                for dizziness

 

           Ceftin 500 mg Tab RxNorm: 304400 1 Tablet(s) PO BID 2011 

Inactive                                 

 

                omeprazole 20 mg Cap, Delayed Release RxNorm: 201406  1 Capsule(

s) PO BID 

2011          10/24/2011          Inactive             

 

           simvastatin 40 mg Tab RxNorm: 284544 1 Tablet(s) PO QD 2011 

Inactive                                 

 

           simvastatin 40 mg Tab RxNorm: 463706 1 Tablet(s) PO QD 2011 

Inactive                                 

 

           simvastatin 40 mg Tab RxNorm: 133933 1 Tablet(s) PO QD 2010 

Inactive                                 

 

             Tessalon Perles 100 mg Cap RxNorm: 870704 1 Capsule(s) PO Q6-8H 2010                Inactive                   

 

           cefdinir 300 mg Cap RxNorm: 764135 1 Capsule(s) PO BID 2010 

Inactive                                 

 

           Lexapro 10 mg Tab RxNorm: 469915 1 Tablet(s) PO QD 2010

010 

Inactive                                 

 

           Cipro 250 mg Tab RxNorm: 800776 1 Tablet(s) PO BID 2010 10/04/2

010 

Inactive                                 

 

             Wellbutrin  mg 24 hr Tab RxNorm: 916608 1 Tablet(s) PO QAM 2010                Inactive                   

 

          Fish Oil 1,000 mg Cap RxNorm:   1 Capsule(s) PO QD No Start Date      

     Active     

 

                Tylenol Extra Strength 500 mg tablet RxNorm: 825585  1/2 Tablet(

s) PO as needed 

No Start Date                           Active               

 

                nitroglycerin 0.4 mg sublingual tablet RxNorm: 988216  Tablet(s)

 SL as needed No 

Start Date                              Active               

 

                    Calcium with Vitamin D 600 mg (1,500 mg)-400 unit tablet RxN

orm: 674767      1 

Tablet(s) PO QD No Start Date                   Active           

 

           Multivitamin & Mineral Formula Tab RxNorm:    1 Tablet(s) PO QD No St

art Date            

Active                                   

 

          vitamin B complex capsule RxNorm:   1 Capsule(s) PO QD No Start Date  

         Active     

 

          Aspirin 81 mg Tab RxNorm: 445479 1 Tablet(s) PO QD No Start Date      

     Active     

 

                    isosorbide mononitrate ER 30 mg tablet,extended release 24 h

r RxNorm: 620495      1 

Tablet(s) PO QHS No Start Date                   Active           

 

           Vitamin D 1,000 unit Cap RxNorm: 629485 1 Capsule(s) PO QD No Start D

ate            

Active                                   

 

          Co Q-10 oral RxNorm: 77284 oral      No Start Date           Active   

  

 

             metoprolol tartrate 25 mg Tab RxNorm: 861924 1/2 Tablet(s) PO QD No

 Start Date 

10/23/2011                Inactive                   

 

             Nasonex 50 mcg/actuation Spray RxNorm: 2020966 2 Spray NASAL No Sta

rt Date 

2018                Inactive                   

 

           Vitamin B12 1000mcg Tablet RxNorm:    1 Tablet(s) PO QD No Start Date

 2015 

Inactive                                 

 

           amlodipine 5 mg Tab RxNorm: 972608 1 Tablet(s) PO QD No Start Date  

Inactive                                 

 

             simvastatin 40 mg tablet RxNorm: 558090 1 Tablet(s) PO QD No Start 

Date 

2019                Inactive                   

 

                omeprazole 20 mg capsule,delayed release RxNorm: 219547  1 Capsu

le(s) PO BID No 

Start Date          2013          Inactive             

 

                omeprazole 40 mg capsule,delayed release RxNorm: 093840  1 Capsu

le(s) PO QD No 

Start Date          2019          Inactive             

 

           Nexium 40 mg Cap RxNorm: 915703 1 Capsule(s) PO QD No Start Date  

Inactive                                 

 

             Stool Softener 100 mg Tab RxNorm: 6727704 2 Tablet(s) PO BID No Sta

rt Date 

2012                Inactive                   

 

                    Calcium with Vitamin D 600 mg (1,500 mg)-400 unit Tab RxNorm

: 881705      1 Tablet(s)

PO QD           No Start Date   2015      Inactive         

 

             iron 325 mg (65 mg iron) Tab RxNorm: 583473 1 Tablet(s) PO QD No St

art Date 

2015                Inactive                   

 

                Flonase 50 mcg/actuation Nasal Spray RxNorm: 133922  2 [place] ROZ

AL BID No Start 

Date                2014          Inactive             

 

                    fluticasone 50 mcg/actuation nasal spray,suspension RxNorm: 

8415852     2 Spray 

NASAL QD to each nostril No Start Date   2018      Inactive         

 

             Nasonex 50 mcg/actuation Spray RxNorm: 5924958 2 Spray NASAL QD No 

Start Date 

2018                Inactive                   

 

                    hydralazine 50 mg tablet RxNorm: 362784      1 Tablet(s) PO 

as needed for BP over 

160/90          No Start Date   2018      Inactive         

 

             Vimovo 500 mg-20 mg 12 hr Tab RxNorm: 736446 1 Tablet(s) PO BID No 

Start Date 

2011                Inactive                   

 

             Tylenol PM 25 mg-500 mg/15 mL Oral Soln RxNorm: 7831869 1 PO QPM   

  No Start Date 

2015                Inactive                   

 

          Iron (Ferrous Sulfate) Oral RxNorm:   Oral      No Start Date 20 Inactive   

 

             Reglan 10 mg Tab RxNorm: 542439 1 Tablet(s) PO TID before meals No 

Start Date 

2011                Inactive                   

 

           Xanax 1 mg Tab RxNorm: 303785 1/2 Tablet(s) PO QD No Start Date  

Inactive                                 

 

             amlodipine 10 mg tablet RxNorm: 733902 1 Tablet(s) PO QD No Start D

ate 

2014                Inactive                   

 

          Iron (dried) Oral RxNorm:   Oral      No Start Date 2012 Inactiv

e   

 

                metoprolol tartrate 50 mg tablet RxNorm: 297359  1 Tablet(s) PO 

BID No Start Date

                    12/10/2018          Inactive             

 

           Xanax 0.25 mg tablet RxNorm: 078138 1 Tablet(s) PO BID No Start Date 

10/29/2018 

Inactive                                 

 

                lancets         RxNorm:         Miscellaneous check blood sugar 

at least once daily No Start 

Date                2013          Inactive             

 

           simvastatin 40 mg Tab RxNorm: 283609 1 Tablet(s) PO QD No Start Date 

2010 

Inactive                                 

 

                    isosorbide mononitrate ER 30 mg tablet,extended release 24 h

r RxNorm: 125247      1 

Tablet(s) PO QHS No Start Date   2019      Inactive         

 

             amlodipine 2.5 mg tablet RxNorm: 249279 1 Tablet(s) PO QD No Start 

Date 

2014                Inactive                   

 

             sucralfate 1 gram tablet RxNorm: 158663 1 Tablet(s) PO QID No Start

 Date 

2019                Inactive                   

 

          Fish Oil Oral RxNorm:   Oral      No Start Date 2012 Inactive   

 

          Multiple Vitamin Oral RxNorm:   Oral      No Start Date 2012 [facility]

ctive   

 

                metoprolol tartrate 25 mg tablet RxNorm: 109899  1/2 Tablet(s) P

O QD No Start 

Date                2017          Inactive             

 

                metoprolol tartrate 50 mg tablet RxNorm: 161228  1/2 Tablet(s) P

O BID No Start 

Date                2019          Inactive             

 

                    Flonase Allergy Relief 50 mcg/actuation nasal spray,suspensi

on RxNorm: 9144534     2

[place] NASAL QHS No Start Date   2019      Inactive         







Medication Administered

No Medication Administered data



Immunizations





                Vaccine         Codes           Date            Status

 

                Influenza       CVX: 135        10/22/2019      Complete

 

                Influenza       CVX: 135        10/22/2019      Complete

 

                Influenza       CVX: 135        2018      Complete

 

                Influenza       CVX: 135        10/06/2017      Complete

 

                Influenza       CVX: 135        10/07/2016      Complete

 

                Influenza       CVX: 135        10/16/2015       

 

                Influenza       CVX: 141        2013       

 

                Influenza (Adult) CVX: 141        10/06/2010       







Results





          Observation Observation Code Item      Item Code Result    Date      S

[facility] Location

 

          COMPLETE BLOOD COUNT [id number]   WBC                 7.1 10e9/L 20

20 Unknown

 

          COMPLETE BLOOD COUNT [id number]   RBC                 4.16 10e12/L 2020 Unknown

 

          COMPLETE BLOOD COUNT 0194561   HEMOGLOBIN           12.4 g/dL 20

20 Unknown

 

          COMPLETE BLOOD COUNT [id number]   HEMATOCRIT           39.3 %    20

20 Unknown

 

          COMPLETE BLOOD COUNT [id number]   MCV                 94.5 fL   

0 Unknown

 

          COMPLETE BLOOD COUNT [id number]   MCH                 29.8 pg   

0 Unknown

 

          COMPLETE BLOOD COUNT 0153578   MCHC                31.6 g/dL 

0 Unknown

 

          COMPLETE BLOOD COUNT [id number]   PLATELET COUNT           161 10e9/L 2020 Unknown

 

          COMPLETE BLOOD COUNT [id number]   Mean Plt Volume           10.8 fL   2020 Unknown

 

          COMPLETE BLOOD COUNT [id number]   Neut Auto           38.7 %    

0 Unknown

 

          COMPLETE BLOOD COUNT [id number]   Lymph Auto           48.0 %    20

20 Unknown

 

          COMPLETE BLOOD COUNT [id number]   Mono Auto           9.5 %     

0 Unknown

 

          COMPLETE BLOOD COUNT [id number]   RDW                 13.5 %    

0 Unknown

 

          COMPLETE BLOOD COUNT [id number]   Eos Auto            3.2 %     

0 Unknown

 

          COMPLETE BLOOD COUNT [id number]   Baso Auto           0.6 %     

0 Unknown

 

          COMPLETE BLOOD COUNT [id number]   Neutrophil Abs           2.75 10e9/L  Unknown

 

          COMPLETE BLOOD COUNT [id number]   Lymphocyte Abs           3.41 10e9/L  Unknown

 

          COMPLETE BLOOD COUNT [id number]   Monocyte Abs           0.67 10e9/L 2020 Unknown

 

          COMPLETE BLOOD COUNT [id number]   Eosinophil Abs           0.23 10e9/L  Unknown

 

          COMPLETE BLOOD COUNT [id number]   RDW-SD              44.7 fL   

0 Unknown

 

          COMPLETE BLOOD COUNT [id number]   Basophil Abs           0.04 10e9/L 2020 Unknown

 

          THYROID STIMULATING HORMONE 17761     TSH                 1.677 uIU/mL

 2020 Unknown

 

          COMPREHENSIVE METABOLIC 53479     AST                 19 U/L    2020 Unknown

 

          COMPREHENSIVE METABOLIC 11526     ALT                 12 U/L    2020 Unknown

 

          COMPREHENSIVE METABOLIC 83886     BUN                 17 mg/dL  2020 Unknown

 

          COMPREHENSIVE METABOLIC 56508     ALBUMIN             4.2 g/dL  2020 Unknown

 

          COMPREHENSIVE METABOLIC 70461     CHLORIDE            103 mmol/L  Unknown

 

          COMPREHENSIVE METABOLIC 90224     Bili Total           0.2 mg/dL  Unknown

 

          COMPREHENSIVE METABOLIC 67345     ALK PHOS            61 U/L    2020 Unknown

 

          COMPREHENSIVE METABOLIC 69675     SODIUM              140 mmol/L  Unknown

 

          COMPREHENSIVE METABOLIC 57294     CREATININE           0.95 mg/dL 2020 Unknown

 

          COMPREHENSIVE METABOLIC 60740     CALCIUM             9.4 mg/dL 2020 Unknown

 

          COMPREHENSIVE METABOLIC 49299     POTASSIUM           4.2 mmol/L  Unknown

 

          COMPREHENSIVE METABOLIC 37331     Total Protein           6.5 g/dL   Unknown

 

          COMPREHENSIVE METABOLIC 78989     Glucose             103 mg/dL 2020 Unknown

 

          COMPREHENSIVE METABOLIC 01399     Bicarbonate           26 mmol/L 2020 Unknown

 

          COMPREHENSIVE METABOLIC 39896     AGAP                11 mmol/L 2020 Unknown

 

          GFR CALC  7381361   GFR Non Afr Amr           57 mL/min 2020 Unk

nown

 

          GFR CALC  2823149   GFR Afr Amr           >60 mL/min 2020 Unknow

n

 

          GFR CALC  6518182   GFR Non Afr Amr           52 mL/min 10/11/2019 Unk

nown

 

          GFR CALC  5482675   GFR Afr Amr           >60 mL/min 10/11/2019 Unknow

n

 

          COMPREHENSIVE METABOLIC 57446     AST                 17 U/L    10/11/

2019 Unknown

 

          COMPREHENSIVE METABOLIC 10870     ALT                 11 U/L    10/11/

2019 Unknown

 

          COMPREHENSIVE METABOLIC 59611     BUN                 17 mg/dL  10/11/

2019 Unknown

 

          COMPREHENSIVE METABOLIC 94554     ALBUMIN             3.9 g/dL  10/11/

2019 Unknown

 

          COMPREHENSIVE METABOLIC 65442     CHLORIDE            108 mmol/L 10/11

/2019 Unknown

 

          COMPREHENSIVE METABOLIC 83549     Bili Total           0.5 mg/dL 10/11

/2019 Unknown

 

          COMPREHENSIVE METABOLIC 10727     ALK PHOS            72 U/L    10/11/

2019 Unknown

 

          COMPREHENSIVE METABOLIC 45488     SODIUM              142 mmol/L 10/11

/2019 Unknown

 

          COMPREHENSIVE METABOLIC 18264     CREATININE           1.02 mg/dL 10/1

2019 Unknown

 

          COMPREHENSIVE METABOLIC 85290     CALCIUM             9.3 mg/dL 10/11/

2019 Unknown

 

          COMPREHENSIVE METABOLIC 35965     POTASSIUM           4.0 mmol/L 10/11

/2019 Unknown

 

          COMPREHENSIVE METABOLIC 10332     Total Protein           6.2 g/dL  10

/2019 Unknown

 

          COMPREHENSIVE METABOLIC 46405     Glucose             93 mg/dL  10/11/

2019 Unknown

 

          COMPREHENSIVE METABOLIC 69414     Bicarbonate           27 mmol/L 10/1

2019 Unknown

 

          COMPREHENSIVE METABOLIC 22470     AGAP                7 mmol/L  10/11/

2019 Unknown

 

          GFR CALC  3173436   GFR Non Afr Amr           48 mL/min 06/10/2019 Unk

nown

 

          GFR CALC  7621213   GFR Afr Amr           59 mL/min 06/10/2019 Unknown

 

          THYROID STIMULATING HORMONE 48071     TSH                 1.936 uIU/mL

 06/10/2019 Unknown

 

          COMPREHENSIVE METABOLIC 33077     AST                 18 U/L    06/10/

2019 Unknown

 

          COMPREHENSIVE METABOLIC 02078     ALT                 11 U/L    06/10/

2019 Unknown

 

          COMPREHENSIVE METABOLIC 89773     BUN                 18 mg/dL  06/10/

2019 Unknown

 

          COMPREHENSIVE METABOLIC 06146     ALBUMIN             4.2 g/dL  06/10/

2019 Unknown

 

          COMPREHENSIVE METABOLIC 60719     CHLORIDE            109 mmol/L 06/10

/2019 Unknown

 

          COMPREHENSIVE METABOLIC 74830     Bili Total           0.4 mg/dL 06/10

/2019 Unknown

 

          COMPREHENSIVE METABOLIC 26852     ALK PHOS            64 U/L    06/10/

2019 Unknown

 

          COMPREHENSIVE METABOLIC 29307     SODIUM              142 mmol/L 06/10

/2019 Unknown

 

          COMPREHENSIVE METABOLIC 61558     CREATININE           1.09 mg/dL  Unknown

 

          COMPREHENSIVE METABOLIC 63531     CALCIUM             9.3 mg/dL 06/10/

2019 Unknown

 

          COMPREHENSIVE METABOLIC 73520     POTASSIUM           4.7 mmol/L 06/10

/2019 Unknown

 

          COMPREHENSIVE METABOLIC 22192     Total Protein           6.3 g/dL  06

/10/2019 Unknown

 

          COMPREHENSIVE METABOLIC 15180     Glucose             84 mg/dL  06/10/

2019 Unknown

 

          COMPREHENSIVE METABOLIC 69819     Bicarbonate           25 mmol/L  Unknown

 

          COMPREHENSIVE METABOLIC 22707     AGAP                8 mmol/L  06/10/

2019 Unknown

 

          COMPLETE BLOOD COUNT [id number]   WBC                 7.8 10e9/L 06/10/20

19 Unknown

 

          COMPLETE BLOOD COUNT [id number]   RBC                 4.04 10e12/L 06/10/

2019 Unknown

 

          COMPLETE BLOOD COUNT [id number]   HEMOGLOBIN           12.2 g/dL 06/10/20

19 Unknown

 

          COMPLETE BLOOD COUNT [id number]   HEMATOCRIT           38.6 %    06/10/20

19 Unknown

 

          COMPLETE BLOOD COUNT [id number]   MCV                 95.5 fL   06/10/201

9 Unknown

 

          COMPLETE BLOOD COUNT [id number]   MCH                 30.2 pg   06/10/201

9 Unknown

 

          COMPLETE BLOOD COUNT [id number]   MCHC                31.6 g/dL 06/10/201

9 Unknown

 

          COMPLETE BLOOD COUNT [id number]   PLATELET COUNT           215 10e9/L 06/

10/2019 Unknown

 

          COMPLETE BLOOD COUNT [id number]   Mean Plt Volume           11.2 fL   06/

10/2019 Unknown

 

          COMPLETE BLOOD COUNT [id number]   Neut Auto           45.7 %    06/10/201

9 Unknown

 

          COMPLETE BLOOD COUNT [id number]   Lymph Auto           42.1 %    06/10/20

19 Unknown

 

          COMPLETE BLOOD COUNT [id number]   Mono Auto           9.2 %     06/10/201

9 Unknown

 

          COMPLETE BLOOD COUNT [id number]   RDW                 13.4 %    06/10/201

9 Unknown

 

          COMPLETE BLOOD COUNT [id number]   Eos Auto            2.7 %     06/10/201

9 Unknown

 

          COMPLETE BLOOD COUNT [id number]   Baso Auto           0.3 %     06/10/201

9 Unknown

 

          COMPLETE BLOOD COUNT [id number]   Neutrophil Abs           3.56 10e9/L 06

/10/2019 Unknown

 

          COMPLETE BLOOD COUNT [id number]   Lymphocyte Abs           3.28 10e9/L 06

/10/2019 Unknown

 

          COMPLETE BLOOD COUNT [id number]   Monocyte Abs           0.72 10e9/L  Unknown

 

          COMPLETE BLOOD COUNT [id number]   Eosinophil Abs           0.21 10e9/L 06

/10/2019 Unknown

 

          COMPLETE BLOOD COUNT [id number]   RDW-SD              44.9 fL   06/10/201

9 Unknown

 

          COMPLETE BLOOD COUNT [id number]   Basophil Abs           0.02 10e9/L  Unknown

 

          COMPLETE BLOOD COUNT [id number]   WBC                 5.2 10e9/L 20

18 Unknown

 

          COMPLETE BLOOD COUNT [id number]   RBC                 4.21 10e12/L 2018 Unknown

 

          COMPLETE BLOOD COUNT [id number]   HEMOGLOBIN           12.8 g/dL 20

18 Unknown

 

          COMPLETE BLOOD COUNT [id number]   HEMATOCRIT           39.0 %    20

18 Unknown

 

          COMPLETE BLOOD COUNT [id number]   MCV                 92.6 fL   

8 Unknown

 

          COMPLETE BLOOD COUNT [id number]   MCH                 30.4 pg   

8 Unknown

 

          COMPLETE BLOOD COUNT [id number]   MCHC                32.8 g/dL 

8 Unknown

 

          COMPLETE BLOOD COUNT [id number]   PLATELET COUNT           202 10e9/L  Unknown

 

          COMPLETE BLOOD COUNT [id number]   Mean Plt Volume           10.7 fL    Unknown

 

          COMPLETE BLOOD COUNT [id number]   Neut Auto           40.9 %    

8 Unknown

 

          COMPLETE BLOOD COUNT [id number]   Lymph Auto           46.3 %    20

18 Unknown

 

          COMPLETE BLOOD COUNT [id number]   Mono Auto           9.3 %     

8 Unknown

 

          COMPLETE BLOOD COUNT [id number]   RDW                 13.7 %    

8 Unknown

 

          COMPLETE BLOOD COUNT [id number]   Eos Auto            2.9 %     

8 Unknown

 

          COMPLETE BLOOD COUNT [id number]   Baso Auto           0.6 %     

8 Unknown

 

          COMPLETE BLOOD COUNT [id number]   Neutrophil Abs           2.13 10e9/L  Unknown

 

          COMPLETE BLOOD COUNT [id number]   Lymphocyte Abs           2.41 10e9/L  Unknown

 

          COMPLETE BLOOD COUNT [id number]   Monocyte Abs           0.48 10e9/L  Unknown

 

          COMPLETE BLOOD COUNT [id number]   Eosinophil Abs           0.15 10e9/L  Unknown

 

          COMPLETE BLOOD COUNT [id number]   RDW-SD              45.2 fL   

8 Unknown

 

          COMPLETE BLOOD COUNT [id number]   Basophil Abs           0.03 10e9/L  Unknown

 

          [place] [address]     Glucose             118 mg/dL  Unknown

 

          [place] [address]     CREATININE           1.01 mg/dL  Unknown

 

          [place] [address]     BUN                 14 mg/dL   Unknown

 

          [place] [address]     SODIUM              141 mmol/L  Unknown

 

          [place] [address]     POTASSIUM           4.0 mmol/L  Unknown

 

          [place] [address]     CHLORIDE            108 mmol/L  Unknown

 

          [place] [address]     Bicarbonate           25 mmol/L  Unknown

 

          [place] [address]     AGAP                8 mmol/L   Unknown

 

          [place] [address]     CALCIUM             9.6 mg/dL  Unknown

 

          FREE T4   28958     T4 Free             1.23 ng/dL 2018 Unknown

 

          GFR CALC  3722422   GFR Non Afr Amr           53 mL/min 2018 Unk

nown

 

          GFR CALC  7289694   GFR Afr Amr           >60 mL/min 2018 Unknow

n

 

          THYROID STIMULATING HORMONE 96338     TSH                 2.124 uIU/mL

 2018 Unknown

 

          LIPID GROUP [id number]     Cholesterol           152 mg/dL 2018 Unkno

wn

 

          LIPID GROUP [id number]     Triglyceride           151 mg/dL 2018 Unkn

own

 

          LIPID GROUP [id number]     HDL CHOLESTEROL           47 mg/dL  2018 U

nknown

 

          LIPID GROUP [id number]     Chol/HDL Ratio           3.23 ratio 2018 U

nknown

 

          LIPID GROUP [id number]     NON-HDL Chol           105 mg/dL 2018 Unkn

own

 

          LIPID GROUP [id number]     LDL Cholesterol           75 mg/dL  2018 U

nknown

 

          ASSAY OF TROPONIN QUANT 95525     Troponin-I           <0.30 ng/mL  Unknown

 

          COMPREHENSIVE METABOLIC 70739     AST                 20 U/L    2018 Unknown

 

          COMPREHENSIVE METABOLIC 16907     ALT                 14 U/L    2018 Unknown

 

          COMPREHENSIVE METABOLIC 68490     BUN                 19 mg/dL  2018 Unknown

 

          COMPREHENSIVE METABOLIC 22463     ALBUMIN             4.2 g/dL  2018 Unknown

 

          COMPREHENSIVE METABOLIC 64846     CHLORIDE            102 mmol/L  Unknown

 

          COMPREHENSIVE METABOLIC 57415     Bili Total           0.4 mg/dL  Unknown

 

          COMPREHENSIVE METABOLIC 57833     ALK PHOS            66 U/L    2018 Unknown

 

          COMPREHENSIVE METABOLIC 52132     SODIUM              135 mmol/L  Unknown

 

          COMPREHENSIVE METABOLIC 24492     CREATININE           1.01 mg/dL  Unknown

 

          COMPREHENSIVE METABOLIC 54011     CALCIUM             9.3 mg/dL 2018 Unknown

 

          COMPREHENSIVE METABOLIC 43268     POTASSIUM           4.8 mmol/L  Unknown

 

          COMPREHENSIVE METABOLIC 74736     Total Protein           7.0 g/dL   Unknown

 

          COMPREHENSIVE METABOLIC 97956     Glucose             91 mg/dL  2018 Unknown

 

          COMPREHENSIVE METABOLIC 16575     Bicarbonate           23 mmol/L  Unknown

 

          COMPREHENSIVE METABOLIC 05982     AGAP                10 mmol/L 2018 Unknown

 

          COMPLETE BLOOD COUNT [id number]   WBC                 7.5 10e9/L 20

18 Unknown

 

          COMPLETE BLOOD COUNT [id number]   RBC                 4.13 10e12/L 2018 Unknown

 

          COMPLETE BLOOD COUNT [id number]   HEMOGLOBIN           12.6 g/dL 20

18 Unknown

 

          COMPLETE BLOOD COUNT [id number]   HEMATOCRIT           38.4 %    20

18 Unknown

 

          COMPLETE BLOOD COUNT [id number]   MCV                 93.0 fL   

8 Unknown

 

          COMPLETE BLOOD COUNT [id number]   MCH                 30.5 pg   

8 Unknown

 

          COMPLETE BLOOD COUNT [id number]   MCHC                32.8 g/dL 

8 Unknown

 

          COMPLETE BLOOD COUNT [id number]   PLATELET COUNT           204 10e9/L  Unknown

 

          COMPLETE BLOOD COUNT [id number]   Mean Plt Volume           10.9 fL    Unknown

 

          COMPLETE BLOOD COUNT [id number]   Neut Auto           43.1 %    

8 Unknown

 

          COMPLETE BLOOD COUNT [id number]   Lymph Auto           45.0 %    20

18 Unknown

 

          COMPLETE BLOOD COUNT [id number]   Mono Auto           9.2 %     

8 Unknown

 

          COMPLETE BLOOD COUNT [id number]   Eos Auto            2.3 %     

8 Unknown

 

          COMPLETE BLOOD COUNT [id number]   RDW                 13.4 %    

8 Unknown

 

          COMPLETE BLOOD COUNT [id number]   Baso Auto           0.4 %     

8 Unknown

 

          COMPLETE BLOOD COUNT [id number]   Neutrophil Abs           3.23 10e9/L  Unknown

 

          COMPLETE BLOOD COUNT [id number]   Lymphocyte Abs           3.38 10e9/L  Unknown

 

          COMPLETE BLOOD COUNT [id number]   Monocyte Abs           0.69 10e9/L  Unknown

 

          COMPLETE BLOOD COUNT [id number]   Eosinophil Abs           0.17 10e9/L  Unknown

 

          COMPLETE BLOOD COUNT [id number]   RDW-SD              44.4 fL   

8 Unknown

 

          COMPLETE BLOOD COUNT [id number]   Basophil Abs           0.03 10e9/L  Unknown

 

          GFR CALC  0825256   GFR Non Afr Amr           53 mL/min 2018 Unk

nown

 

          GFR CALC  3314610   GFR Afr Amr           >60 mL/min 2018 Unknow

n

 

          GLYCOSYLATED HEMOGLOBIN TEST 63503     Hgb A1c   08138-0   5.4 %     0

2018 Unknown

 

          MEAN GLUC 5008871   Calc Mean Gluc           108 mg/dL 2018 Unkn

own

 

          MEAN GLUC 8371881   Calc Mean Gluc           114 mg/dL 2017 Unkn

own

 

          LIPID GROUP [id number]     Cholesterol           146 mg/dL 2017 Unkno

wn

 

          LIPID GROUP 53058     Triglyceride           119 mg/dL 2017 Unkn

own

 

          LIPID GROUP [id number]     HDL CHOLESTEROL           47 mg/dL  2017 U

nknown

 

          LIPID GROUP 51504     Chol/HDL Ratio           3.11 ratio 2017 U

nknown

 

          LIPID GROUP 10433     NON-HDL Chol           99 mg/dL  2017 Unkn

own

 

          LIPID GROUP 07700     LDL Cholesterol           75 mg/dL  2017 U

nknown

 

          GLYCOSYLATED HEMOGLOBIN TEST 51599     Hgb A1c   61605-0   5.6 %     0

2017 Unknown

 

          COMPREHENSIVE METABOLIC 05011     AST                 22 U/L    2017 Unknown

 

          COMPREHENSIVE METABOLIC 34511     ALT                 12 U/L    2017 Unknown

 

          COMPREHENSIVE METABOLIC 23032     BUN                 17 mg/dL  2017 Unknown

 

          COMPREHENSIVE METABOLIC 55852     ALBUMIN             4.0 g/dL  2017 Unknown

 

          COMPREHENSIVE METABOLIC 37514     CHLORIDE            110 mmol/L  Unknown

 

          COMPREHENSIVE METABOLIC 83144     Bili Total           0.4 mg/dL  Unknown

 

          COMPREHENSIVE METABOLIC 82123     ALK PHOS            63 U/L    2017 Unknown

 

          COMPREHENSIVE METABOLIC 03094     SODIUM              140 mmol/L  Unknown

 

          COMPREHENSIVE METABOLIC 31989     CREATININE           1.05 mg/dL  Unknown

 

          COMPREHENSIVE METABOLIC 34279     CALCIUM             9.2 mg/dL 2017 Unknown

 

          COMPREHENSIVE METABOLIC 56917     POTASSIUM           4.2 mmol/L  Unknown

 

          COMPREHENSIVE METABOLIC 83231     Total Protein           6.2 g/dL   Unknown

 

          COMPREHENSIVE METABOLIC 46188     Glucose             87 mg/dL  2017 Unknown

 

          COMPREHENSIVE METABOLIC 86647     Bicarbonate           24 mmol/L  Unknown

 

          COMPREHENSIVE METABOLIC 11471     AGAP                6 mmol/L  2017 Unknown

 

          GFR CALC  2381997   GFR Afr Amr           >60 mL/min 2017 Unknow

n

 

          GFR CALC  7692621   GFR Non Afr Amr           51 mL/min 2017 Unk

nown

 

          COMPLETE BLOOD COUNT [id number]   WBC                 6.7 10e9/L 20

17 Unknown

 

          COMPLETE BLOOD COUNT [id number]   RBC                 4.04 10e12/L 2017 Unknown

 

          COMPLETE BLOOD COUNT [id number]   HEMOGLOBIN           12.1 g/dL 20

17 Unknown

 

          COMPLETE BLOOD COUNT [id number]   HEMATOCRIT           38.0 %    20

17 Unknown

 

          COMPLETE BLOOD COUNT [id number]   MCV                 94.1 fL   

7 Unknown

 

          COMPLETE BLOOD COUNT [id number]   MCH                 30.0 pg   

7 Unknown

 

          COMPLETE BLOOD COUNT [id number]   MCHC                31.8 g/dL 

7 Unknown

 

          COMPLETE BLOOD COUNT [id number]   PLATELET COUNT           206 10e9/L  Unknown

 

          COMPLETE BLOOD COUNT 6872430   Mean Plt Volume           11.3 fL    Unknown

 

          COMPLETE BLOOD COUNT [id number]   Neut Auto           35.8 %    

7 Unknown

 

          COMPLETE BLOOD COUNT [id number]   Lymph Auto           51.6 %    20

17 Unknown

 

          COMPLETE BLOOD COUNT [id number]   Mono Auto           8.8 %     

7 Unknown

 

          COMPLETE BLOOD COUNT [id number]   RDW                 13.5 %    

7 Unknown

 

          COMPLETE BLOOD COUNT [id number]   Eos Auto            3.4 %     

7 Unknown

 

          COMPLETE BLOOD COUNT [id number]   Baso Auto           0.4 %     

7 Unknown

 

          COMPLETE BLOOD COUNT [id number]   Neutrophil Abs           2.40 10e9/L  Unknown

 

          COMPLETE BLOOD COUNT [id number]   Lymphocyte Abs           3.46 10e9/L  Unknown

 

          COMPLETE BLOOD COUNT [id number]   Monocyte Abs           0.59 10e9/L  Unknown

 

          COMPLETE BLOOD COUNT [id number]   Eosinophil Abs           0.23 10e9/L  Unknown

 

          COMPLETE BLOOD COUNT [id number]   RDW-SD              45.3 fL   

7 Unknown

 

          COMPLETE BLOOD COUNT [id number]   Basophil Abs           0.03 10e9/L  Unknown

 

          THYROID STIMULATING HORMONE 61284     TSH                 1.981 uIU/mL

 2017 Unknown

 

          COMPLETE BLOOD COUNT [id number]   WBC                 6.0 10e9/L 20

17 Unknown

 

          COMPLETE BLOOD COUNT [id number]   RBC                 4.29 10e12/L 2017 Unknown

 

          COMPLETE BLOOD COUNT [id number]   HEMOGLOBIN           12.9 g/dL 20

17 Unknown

 

          COMPLETE BLOOD COUNT [id number]   HEMATOCRIT           38.4 %    20

17 Unknown

 

          COMPLETE BLOOD COUNT [id number]   MCV                 89.5 fL   

7 Unknown

 

          COMPLETE BLOOD COUNT [id number]   MCH                 30.1 pg   

7 Unknown

 

          COMPLETE BLOOD COUNT [id number]   MCHC                33.6 g/dL 

7 Unknown

 

          COMPLETE BLOOD COUNT [id number]   PLATELET COUNT           181 10e9/L 2017 Unknown

 

          COMPLETE BLOOD COUNT [id number]   Mean Plt Volume           11.7 fL   2017 Unknown

 

          COMPLETE BLOOD COUNT [id number]   Neut Auto           36.9 %    

7 Unknown

 

          COMPLETE BLOOD COUNT [id number]   Lymph Auto           50.4 %    20

17 Unknown

 

          COMPLETE BLOOD COUNT [id number]   Mono Auto           9.0 %     

7 Unknown

 

          COMPLETE BLOOD COUNT [id number]   RDW                 13.7 %    

7 Unknown

 

          COMPLETE BLOOD COUNT [id number]   Eos Auto            3.4 %     

7 Unknown

 

          COMPLETE BLOOD COUNT [id number]   Baso Auto           0.3 %     

7 Unknown

 

          COMPLETE BLOOD COUNT [id number]   Neutrophil Abs           2.21 10e9/L  Unknown

 

          COMPLETE BLOOD COUNT [id number]   Lymphocyte Abs           3.02 10e9/L  Unknown

 

          COMPLETE BLOOD COUNT [id number]   Monocyte Abs           0.54 10e9/L 2017 Unknown

 

          COMPLETE BLOOD COUNT [id number]   Eosinophil Abs           0.20 10e9/L  Unknown

 

          COMPLETE BLOOD COUNT [id number]   RDW-SD              44.0 fL   

7 Unknown

 

          COMPLETE BLOOD COUNT [id number]   Basophil Abs           0.02 10e9/L 2017 Unknown

 

          GLYCOSYLATED HEMOGLOBIN TEST 48160     Hgb A1c   58699-6   5.4 %     0

3/09/2017 Unknown

 

          THYROID STIMULATING HORMONE 00400     TSH                 2.200 uIU/mL

 2017 Unknown

 

          GFR CALC  8136195   GFR Non Afr Amr           50 mL/min 2017 Unk

nown

 

          GFR CALC  2515127   GFR Afr Amr           >60 mL/min 2017 Unknow

n

 

          MEAN GLUC 9222397   Calc Mean Gluc           108 mg/dL 2017 Unkn

own

 

          COMPREHENSIVE METABOLIC 22649     AST                 18 U/L    2017 Unknown

 

          COMPREHENSIVE METABOLIC 37156     ALT                 10 U/L    2017 Unknown

 

          COMPREHENSIVE METABOLIC 80771     BUN                 20 mg/dL  2017 Unknown

 

          COMPREHENSIVE METABOLIC 71849     ALBUMIN             4.1 g/dL  2017 Unknown

 

          COMPREHENSIVE METABOLIC 45868     CHLORIDE            109 mmol/L  Unknown

 

          COMPREHENSIVE METABOLIC 42322     Bili Total           0.6 mg/dL  Unknown

 

          COMPREHENSIVE METABOLIC 88261     ALK PHOS            64 U/L    2017 Unknown

 

          COMPREHENSIVE METABOLIC 90374     SODIUM              141 mmol/L  Unknown

 

          COMPREHENSIVE METABOLIC 48521     CREATININE           1.06 mg/dL 2017 Unknown

 

          COMPREHENSIVE METABOLIC 87554     CALCIUM             9.9 mg/dL 2017 Unknown

 

          COMPREHENSIVE METABOLIC 73196     POTASSIUM           4.2 mmol/L  Unknown

 

          COMPREHENSIVE METABOLIC 05353     Total Protein           6.3 g/dL   Unknown

 

          COMPREHENSIVE METABOLIC 89862     Glucose             99 mg/dL  2017 Unknown

 

          COMPREHENSIVE METABOLIC 55148     Bicarbonate           21 mmol/L 2017 Unknown

 

          COMPREHENSIVE METABOLIC 24254     AGAP                11 mmol/L 2017 Unknown

 

          LIPID GROUP 53237     Cholesterol           169 mg/dL 2016 Unkno

wn

 

          LIPID GROUP 76105     Triglyceride           165 mg/dL 2016 Unkn

own

 

          LIPID GROUP 37553     HDL CHOLESTEROL           43 mg/dL  2016 U

nknown

 

          LIPID GROUP 74375     Chol/HDL Ratio           3.93 ratio 2016 U

nknown

 

          LIPID GROUP 93350     NON-HDL Chol           126 mg/dL 2016 Unkn

own

 

          LIPID GROUP 24500     LDL Cholesterol           93 mg/dL  2016 U

nknown

 

          COMPREHENSIVE METABOLIC 36332     AST                 18 U/L    2016 Unknown

 

          COMPREHENSIVE METABOLIC 69077     ALT                 10 U/L    2016 Unknown

 

          COMPREHENSIVE METABOLIC 93690     BUN                 20 mg/dL  2016 Unknown

 

          COMPREHENSIVE METABOLIC 17940     ALBUMIN             3.9 g/dL  2016 Unknown

 

          COMPREHENSIVE METABOLIC 86736     CHLORIDE            110 mmol/L  Unknown

 

          COMPREHENSIVE METABOLIC 45961     Bili Total           0.5 mg/dL  Unknown

 

          COMPREHENSIVE METABOLIC 32780     ALK PHOS            72 U/L    2016 Unknown

 

          COMPREHENSIVE METABOLIC 36301     SODIUM              141 mmol/L  Unknown

 

          COMPREHENSIVE METABOLIC 97818     CREATININE           1.12 mg/dL  Unknown

 

          COMPREHENSIVE METABOLIC 37741     CALCIUM             9.7 mg/dL 2016 Unknown

 

          COMPREHENSIVE METABOLIC 73230     POTASSIUM           4.4 mmol/L  Unknown

 

          COMPREHENSIVE METABOLIC 85986     Total Protein           6.2 g/dL   Unknown

 

          COMPREHENSIVE METABOLIC 38315     Glucose             90 mg/dL  2016 Unknown

 

          COMPREHENSIVE METABOLIC 20097     Bicarbonate           23 mmol/L  Unknown

 

          COMPREHENSIVE METABOLIC 47470     AGAP                8 mmol/L  2016 Unknown

 

          GFR CALC  3695403   GFR Afr Amr           57 mL/min 2016 Unknown

 

          GFR CALC  4469429   GFR Non Afr Amr           47 mL/min 2016 Unk

nown

 

          GLYCOSYLATED HEMOGLOBIN TEST 64805     Hgb A1c   43215-7   5.5 %     0

2016 Unknown

 

          THYROID STIMULATING HORMONE 67894     TSH                 2.537 uIU/mL

 2016 Unknown

 

          FREE T4   59855     T4 Free             1.36 ng/dL 2016 Unknown

 

          COMPLETE BLOOD COUNT 9665119   WBC                 6.8 10e9/L 20

16 Unknown

 

          COMPLETE BLOOD COUNT [id number]   RBC                 4.20 10e12/L 2016 Unknown

 

          COMPLETE BLOOD COUNT 3904426   HEMOGLOBIN           12.5 g/dL 20

16 Unknown

 

          COMPLETE BLOOD COUNT 8258801   HEMATOCRIT           38.0 %    20

16 Unknown

 

          COMPLETE BLOOD COUNT 7567445   MCV                 90.5 fL   

6 Unknown

 

          COMPLETE BLOOD COUNT 1105203   MCH                 29.8 pg   

6 Unknown

 

          COMPLETE BLOOD COUNT 7329268   MCHC                32.9 g/dL 

6 Unknown

 

          COMPLETE BLOOD COUNT 1543895   PLATELET COUNT           197 10e9/L  Unknown

 

          COMPLETE BLOOD COUNT 7259822   Mean Plt Volume           11.7 fL    Unknown

 

          COMPLETE BLOOD COUNT 7626414   Neut Auto           41.3 %    

6 Unknown

 

          COMPLETE BLOOD COUNT [id number]   Lymph Auto           47.1 %    20

16 Unknown

 

          COMPLETE BLOOD COUNT 0529901   Mono Auto           7.8 %     

6 Unknown

 

          COMPLETE BLOOD COUNT [id number]   RDW                 13.8 %    

6 Unknown

 

          COMPLETE BLOOD COUNT [id number]   Eos Auto            3.4 %     

6 Unknown

 

          COMPLETE BLOOD COUNT [id number]   Baso Auto           0.4 %     

6 Unknown

 

          COMPLETE BLOOD COUNT [id number]   Neutrophil Abs           2.81 10e9/L  Unknown

 

          COMPLETE BLOOD COUNT 2544072   Lymphocyte Abs           3.20 10e9/L  Unknown

 

          COMPLETE BLOOD COUNT 4451607   Monocyte Abs           0.53 10e9/L  Unknown

 

          COMPLETE BLOOD COUNT [id number]   Eosinophil Abs           0.23 10e9/L  Unknown

 

          COMPLETE BLOOD COUNT 5957313   Basophil Abs           0.03 10e9/L  Unknown

 

          COMPLETE BLOOD COUNT 9405470   RDW-SD              44.4 fL   

6 Unknown

 

          MEAN GLUC 9848124   Calc Mean Gluc           111 mg/dL 2016 Unkn

own

 

          [place] [address]     Glucose             89 MG/DL   Unknown

 

          [place] [address]     CREATININE           1.12 MG/DL  Unknown

 

          [place] [address]     BUN                 20 MG/DL   Unknown

 

          [place] [address]     SODIUM              139 MMOL/L  Unknown

 

          [place] [address]     POTASSIUM           4.6 MMOL/L  Unknown

 

          [place] [address]     CHLORIDE            108 MMOL/L  Unknown

 

          [place] [address]     BICARB              26 MMOL/L  Unknown

 

          [place] [address]     ANION GAP           5 MEQ/L    Unknown

 

          [place] [address]     CALCIUM             10.0 MG/DL  Unknown

 

          GFR CALC  2431753   GFR AA              57.0L ML/MIN 2015 Unknow

n

 

          GFR CALC  5870647   GFR NON-AA           47.0L ML/MIN 2015 Unkno

wn

 

          THYROID STIMULATING HORMONE 39989     TSH                 2.378 uIU/ML

 09/10/2015 Unknown

 

          COMPLETE BLOOD COUNT [id number]   WBC                 6.4 10e9/L 09/10/20

15 Unknown

 

          COMPLETE BLOOD COUNT [id number]   RBC                 3.99 10e12/L 09/10/

2015 Unknown

 

          COMPLETE BLOOD COUNT [id number]   HGB                 11.9 g/dL 09/10/201

5 Unknown

 

          COMPLETE BLOOD COUNT [id number]   HCT DET             36.9 %    09/10/201

5 Unknown

 

          COMPLETE BLOOD COUNT [id number]   MCV                 92.5 fL   09/10/201

5 Unknown

 

          COMPLETE BLOOD COUNT [id number]   MCH                 29.8 pg   09/10/201

5 Unknown

 

          COMPLETE BLOOD COUNT [id number]   MCHC                32.2 g/dL 09/10/201

5 Unknown

 

          COMPLETE BLOOD COUNT [id number]   PLT                 172 10e9/L 09/10/20

15 Unknown

 

          COMPLETE BLOOD COUNT [id number]   MPV                 11.7 fL   09/10/201

5 Unknown

 

          COMPLETE BLOOD COUNT [id number]   ARIK %               40.4 %    09/10/201

5 Unknown

 

          COMPLETE BLOOD COUNT [id number]   LY %                48.0 %    09/10/201

5 Unknown

 

          COMPLETE BLOOD COUNT 4212044   MON %               8.3 %     09/10/201

5 Unknown

 

          COMPLETE BLOOD COUNT [id number]   EOS  %              2.8 %     09/10/201

5 Unknown

 

          COMPLETE BLOOD COUNT [id number]   BASO %              0.5 %     09/10/201

5 Unknown

 

          COMPLETE BLOOD COUNT 6040103   RDW                 13.6 %    09/10/201

5 Unknown

 

          COMPLETE BLOOD COUNT [id number]   ABS ARIK             2.59 10e9/L 09/10/2

015 Unknown

 

          COMPLETE BLOOD COUNT [id number]   ABS LYMPH           3.07 10e9/L 09/10/2

015 Unknown

 

          COMPLETE BLOOD COUNT [id number]   ABS MONO            0.53 10e9/L 09/10/2

015 Unknown

 

          COMPLETE BLOOD COUNT [id number]   ABS EOS             0.18 10e9/L 09/10/2

015 Unknown

 

          COMPLETE BLOOD COUNT [id number]   ABS BASO            0.03 10e9/L 09/10/2

015 Unknown

 

          COMPLETE BLOOD COUNT [id number]   RDW-SD              44.9 fL   09/10/201

5 Unknown

 

          LIPID GROUP 81161     HDL TEST            42 MG/DL  09/10/2015 Unknown

 

          LIPID GROUP 78499     TRIG                177 MG/DL 09/10/2015 Unknown

 

          LIPID GROUP 70672     TEST LDL            72 MG/DL  09/10/2015 Unknown

 

          LIPID GROUP 49265     CHOL                149 MG/DL 09/10/2015 Unknown

 

          LIPID GROUP 11559     RCHOL/HDL           3.55 RATIO 09/10/2015 Unknow

n

 

          LIPID GROUP 42890     NON-HDL CH           107 MG/DL 09/10/2015 Unknow

n

 

          GLYCOSYLATED HEMOGLOBIN TEST 12686     A1C HPLC  56196-6   5.5 %     0

9/10/2015 Unknown

 

          FREE T4   66038     FREE T4             1.39 NG/DL 09/10/2015 Unknown

 

          GFR CALC  0841285   GFR AA              55.0L ML/MIN 09/10/2015 Unknow

n

 

          GFR CALC  3974785   GFR NON-AA           46.0L ML/MIN 09/10/2015 Unkno

wn

 

          COMPREHENSIVE METABOLIC 30563     AST                 17 U/L    09/10/

2015 Unknown

 

          COMPREHENSIVE METABOLIC 55590     ALT                 10 IU/L   09/10/

2015 Unknown

 

          COMPREHENSIVE METABOLIC 74296     BUN                 20 MG/DL  09/10/

2015 Unknown

 

          COMPREHENSIVE METABOLIC 82565     ALBUMIN             3.9 GM/DL 09/10/

2015 Unknown

 

          COMPREHENSIVE METABOLIC 66583     CHLORIDE            111 MMOL/L 09/10

/2015 Unknown

 

          COMPREHENSIVE METABOLIC 23922     BILI TOT            0.4 MG/DL 09/10/

2015 Unknown

 

          COMPREHENSIVE METABOLIC 72976     ALK PHOS            70 U/L    09/10/

2015 Unknown

 

          COMPREHENSIVE METABOLIC 86098     SODIUM              142 MMOL/L 09/10

/2015 Unknown

 

          COMPREHENSIVE METABOLIC 96599     CREATININE           1.16 MG/DL  Unknown

 

          COMPREHENSIVE METABOLIC 55088     CALCIUM             9.4 MG/DL 09/10/

2015 Unknown

 

          COMPREHENSIVE METABOLIC 41207     POTASSIUM           4.6 MMOL/L 09/10

/2015 Unknown

 

          COMPREHENSIVE METABOLIC 64934     PROT TOT            6.2 GM/DL 09/10/

2015 Unknown

 

          COMPREHENSIVE METABOLIC 56679     Glucose             90 MG/DL  09/10/

2015 Unknown

 

          COMPREHENSIVE METABOLIC 04205     BICARB              24 MMOL/L 09/10/

2015 Unknown

 

          COMPREHENSIVE METABOLIC 82406     ANION GAP           7 MEQ/L   09/10/

2015 Unknown

 

          THYROID STIMULATING HORMONE 45166     TSH                 2.427 uIU/ML

 2015 Unknown

 

          LIPID GROUP 02363     HDL TEST            47 MG/DL  2015 Unknown

 

          LIPID GROUP 56926     TRIG                145 MG/DL 2015 Unknown

 

          LIPID GROUP 52588     TEST LDL            73 MG/DL  2015 Unknown

 

          LIPID GROUP 10279     CHOL                149 MG/DL 2015 Unknown

 

          LIPID GROUP 82450     RCHOL/HDL           3.17 RATIO 2015 Unknow

n

 

          LIPID GROUP 73302     NON-HDL CH           102 MG/DL 2015 Unknow

n

 

          COMPREHENSIVE METABOLIC 70830     AST                 17 U/L    2015 Unknown

 

          COMPREHENSIVE METABOLIC 16321     ALT                 9 IU/L    2015 Unknown

 

          COMPREHENSIVE METABOLIC 25926     BUN                 19 MG/DL  2015 Unknown

 

          COMPREHENSIVE METABOLIC 09147     ALBUMIN             4.3 GM/DL 2015 Unknown

 

          COMPREHENSIVE METABOLIC 80071     CHLORIDE            108 MMOL/L  Unknown

 

          COMPREHENSIVE METABOLIC 37926     BILI TOT            0.5 MG/DL 2015 Unknown

 

          COMPREHENSIVE METABOLIC 67516     ALK PHOS            68 U/L    2015 Unknown

 

          COMPREHENSIVE METABOLIC 74670     SODIUM              140 MMOL/L  Unknown

 

          COMPREHENSIVE METABOLIC 57548     CREATININE           1.08 MG/DL 2015 Unknown

 

          COMPREHENSIVE METABOLIC 16843     CALCIUM             9.9 MG/DL 2015 Unknown

 

          COMPREHENSIVE METABOLIC 83330     POTASSIUM           4.3 MMOL/L  Unknown

 

          COMPREHENSIVE METABOLIC 58640     PROT TOT            7.2 GM/DL 2015 Unknown

 

          COMPREHENSIVE METABOLIC 86928     Glucose             94 MG/DL  2015 Unknown

 

          COMPREHENSIVE METABOLIC 86094     BICARB              26 MMOL/L 2015 Unknown

 

          COMPREHENSIVE METABOLIC 16970     ANION GAP           6 MEQ/L   2015 Unknown

 

          GFR CALC  5255165   GFR AA              60.0L ML/MIN 2015 Unknow

n

 

          GFR CALC  2472965   GFR NON-AA           49.0L ML/MIN 2015 Unkno

wn

 

          GLYCOSYLATED HEMOGLOBIN TEST 89517     A1C HPLC  99113-6   5.6 %     0

3/06/2015 Unknown

 

          COMPLETE BLOOD COUNT 6790404   WBC                 7.2 10e9/L 20

15 Unknown

 

          COMPLETE BLOOD COUNT [id number]   RBC                 4.28 10e12/L 2015 Unknown

 

          COMPLETE BLOOD COUNT 5355366   HGB                 12.8 g/dL 

5 Unknown

 

          COMPLETE BLOOD COUNT 0777127   HCT DET             39.3 %    

5 Unknown

 

          COMPLETE BLOOD COUNT 1042432   MCV                 91.8 fL   

5 Unknown

 

          COMPLETE BLOOD COUNT 1056121   MCH                 29.9 pg   

5 Unknown

 

          COMPLETE BLOOD COUNT 4655532   MCHC                32.6 g/dL 

5 Unknown

 

          COMPLETE BLOOD COUNT 7848986   PLT                 189 10e9/L 20

15 Unknown

 

          COMPLETE BLOOD COUNT 4265513   MPV                 11.2 fL   

5 Unknown

 

          COMPLETE BLOOD COUNT 1244825   ARIK %               38.0 %    

5 Unknown

 

          COMPLETE BLOOD COUNT 8463240   LY %                51.0 %    

5 Unknown

 

          COMPLETE BLOOD COUNT 3945958   MON %               7.7 %     

5 Unknown

 

          COMPLETE BLOOD COUNT 4944781   EOS  %              2.9 %     

5 Unknown

 

          COMPLETE BLOOD COUNT 7306355   BASO %              0.4 %     

5 Unknown

 

          COMPLETE BLOOD COUNT 3536681   RDW                 14.0 %    

5 Unknown

 

          COMPLETE BLOOD COUNT 9396348   ABS ARIK             2.74 10e9/L 

015 Unknown

 

          COMPLETE BLOOD COUNT 4816145   ABS LYMPH           3.67 10e9/L 

015 Unknown

 

          COMPLETE BLOOD COUNT 1066188   ABS MONO            0.55 10e9/L 

015 Unknown

 

          COMPLETE BLOOD COUNT 1157910   ABS EOS             0.21 10e9/L 

015 Unknown

 

          COMPLETE BLOOD COUNT 2742613   ABS BASO            0.03 10e9/L 

015 Unknown

 

          COMPLETE BLOOD COUNT 5458376   RDW-SD              46.1 fL   

5 Unknown

 

          FREE T4   99366     FREE T4             1.14 NG/DL 2015 Unknown

 

          GLYCOSYLATED HEMOGLOBIN TEST 46416     A1C HPLC  19633-8   5.2 %     0

2014 Unknown

 

          FREE T4   48041     FREE T4             1.40 NG/DL 2014 Unknown

 

          GFR CALC  1166448   GFR AA              >60 ML/MIN 2014 Unknown

 

          GFR CALC  8758186   GFR NON-AA           52.0L ML/MIN 2014 Unkno

wn

 

          COMPREHENSIVE METABOLIC 36930     AST                 15 U/L    2014 Unknown

 

          COMPREHENSIVE METABOLIC 32703     ALT                 9 IU/L    2014 Unknown

 

          COMPREHENSIVE METABOLIC 45287     BUN                 17 MG/DL  2014 Unknown

 

          COMPREHENSIVE METABOLIC 73593     ALBUMIN             4.0 GM/DL 2014 Unknown

 

          COMPREHENSIVE METABOLIC 99455     CHLORIDE            112 MMOL/L  Unknown

 

          COMPREHENSIVE METABOLIC 92146     BILI TOT            0.5 MG/DL 2014 Unknown

 

          COMPREHENSIVE METABOLIC 77331     ALK PHOS            66 U/L    2014 Unknown

 

          COMPREHENSIVE METABOLIC 12223     SODIUM              140 MMOL/L  Unknown

 

          COMPREHENSIVE METABOLIC 83225     CREATININE           1.03 MG/DL  Unknown

 

          COMPREHENSIVE METABOLIC 97984     CALCIUM             9.5 MG/DL 2014 Unknown

 

          COMPREHENSIVE METABOLIC 80124     POTASSIUM           4.1 MMOL/L  Unknown

 

          COMPREHENSIVE METABOLIC 46437     PROT TOT            6.2 GM/DL 2014 Unknown

 

          COMPREHENSIVE METABOLIC 52219     Glucose             102 MG/DL 2014 Unknown

 

          COMPREHENSIVE METABOLIC 30504     BICARB              23 MMOL/L 2014 Unknown

 

          COMPREHENSIVE METABOLIC 12745     ANION GAP           5 MEQ/L   2014 Unknown

 

          THYROID STIMULATING HORMONE 02900     TSH                 2.074 uIU/ML

 2014 Unknown

 

          VITAMIN B 12 FOLIC ACID [id number] VIT B 12            423 PG/ML  Unknown

 

          VITAMIN B 12 FOLIC ACID [id number] FOLIC ACID           19.7 NG/ML  Unknown

 

          LIPID GROUP 19421     HDL TEST            40 MG/DL  2014 Unknown

 

          LIPID GROUP 72772     TRIG                145 MG/DL 2014 Unknown

 

          LIPID GROUP 36109     TEST LDL            81 MG/DL  2014 Unknown

 

          LIPID GROUP 65356     CHOL                150 MG/DL 2014 Unknown

 

          LIPID GROUP 44574     RCHOL/HDL           3.75 RATIO 2014 Unknow

n

 

          COMPLETE BLOOD COUNT [id number]   WBC                 6.0 10e9/L 20

14 Unknown

 

          COMPLETE BLOOD COUNT 0358005   RBC                 4.26 10e12/L 2014 Unknown

 

          COMPLETE BLOOD COUNT [id number]   HGB                 12.7 g/dL 

4 Unknown

 

          COMPLETE BLOOD COUNT [id number]   HCT DET             38.7 %    

4 Unknown

 

          COMPLETE BLOOD COUNT [id number]   MCV                 90.8 fL   

4 Unknown

 

          COMPLETE BLOOD COUNT [id number]   MCH                 29.8 pg   

4 Unknown

 

          COMPLETE BLOOD COUNT [id number]   MCHC                32.8 g/dL 

4 Unknown

 

          COMPLETE BLOOD COUNT [id number]   PLT                 178 10e9/L 20

14 Unknown

 

          COMPLETE BLOOD COUNT [id number]   MPV                 11.7 fL   

4 Unknown

 

          COMPLETE BLOOD COUNT 8061625   ARIK %               30.5 %    

4 Unknown

 

          COMPLETE BLOOD COUNT 7761478   LY %                55.4 %    

4 Unknown

 

          COMPLETE BLOOD COUNT 7931479   MON %               9.0 %     

4 Unknown

 

          COMPLETE BLOOD COUNT 7269187   EOS  %              4.4 %     

4 Unknown

 

          COMPLETE BLOOD COUNT 3455723   BASO %              0.7 %     

4 Unknown

 

          COMPLETE BLOOD COUNT 8503160   RDW                 13.3 %    

4 Unknown

 

          COMPLETE BLOOD COUNT [id number]   ABS ARIK             1.83 10e9/L 

014 Unknown

 

          COMPLETE BLOOD COUNT [id number]   ABS LYMPH           3.32 10e9/L 

014 Unknown

 

          COMPLETE BLOOD COUNT [id number]   ABS MONO            0.54 10e9/L 

014 Unknown

 

          COMPLETE BLOOD COUNT 2006895   ABS EOS             0.26 10e9/L 

014 Unknown

 

          COMPLETE BLOOD COUNT 1159385   ABS BASO            0.04 10e9/L 

014 Unknown

 

          COMPLETE BLOOD COUNT 3609106   RDW-SD              43.2 fL   

4 Unknown

 

          HEMOGLOBIN A1C (GLYCOSYLATED) 7042825   A1C HPLC  14810-0   5.5 %     

2012 Unknown

 

          COMPLETE BLOOD COUNT [id number]   WBC                 6.0 10e9/L 20

12 Unknown

 

          COMPLETE BLOOD COUNT 1052385   RBC                 4.22 10e12/L 2012 Unknown

 

          COMPLETE BLOOD COUNT [id number]   HGB                 12.4 g/dL 

2 Unknown

 

          COMPLETE BLOOD COUNT 2443829   HCT DET             38.2 %    

2 Unknown

 

          COMPLETE BLOOD COUNT 1642234   MCV                 90.5 fL   

2 Unknown

 

          COMPLETE BLOOD COUNT 0149292   MCH                 29.4 pg   

2 Unknown

 

          COMPLETE BLOOD COUNT 0658390   MCHC                32.5 g/dL 

2 Unknown

 

          COMPLETE BLOOD COUNT [id number]   PLT                 187 10e9/L 20

12 Unknown

 

          COMPLETE BLOOD COUNT 7676812   MPV                 11.5 fL   

2 Unknown

 

          COMPLETE BLOOD COUNT 1265848   ARIK %               36.4 %    

2 Unknown

 

          COMPLETE BLOOD COUNT 5618203   LY %                51.0 %    

2 Unknown

 

          COMPLETE BLOOD COUNT [id number]   MON %               8.7 %     

2 Unknown

 

          COMPLETE BLOOD COUNT 9231921   EOS  %              3.2 %     

2 Unknown

 

          COMPLETE BLOOD COUNT 2206991   BASO %              0.7 %     

2 Unknown

 

          COMPLETE BLOOD COUNT 9953999   RDW                 13.7 %    

2 Unknown

 

          COMPLETE BLOOD COUNT 2571036   ABS ARIK             2.18 10e9/L 

012 Unknown

 

          COMPLETE BLOOD COUNT 3250638   ABS LYMPH           3.06 10e9/L 

012 Unknown

 

          COMPLETE BLOOD COUNT 9136935   ABS MONO            0.52 10e9/L 

012 Unknown

 

          COMPLETE BLOOD COUNT 7125869   ABS EOS             0.19 10e9/L 

012 Unknown

 

          COMPLETE BLOOD COUNT 6797820   ABS BASO            0.04 10e9/L 

012 Unknown

 

          COMPLETE BLOOD COUNT 9627073   RDW-SD              44.3 fL   

2 Unknown

 

          LIPID GROUP [id number]     HDL TEST            42 MG/DL  2012 Unknown

 

          LIPID GROUP [id number]     TRIG                156 MG/DL 2012 Unknown

 

          LIPID GROUP [id number]     TEST LDL            80 MG/DL  2012 Unknown

 

          LIPID GROUP [id number]     CHOL                153 MG/DL 2012 Unknown

 

          LIPID GROUP [id number]     RCHOL/HDL           3.64 RATIO 2012 Unknow

n

 

          FREE T4   62587     FREE T4             1.22 NG/DL 2012 Unknown

 

          COMPREHENSIVE METABOLIC 95844     AST                 20 U/L    2012 Unknown

 

          COMPREHENSIVE METABOLIC 39640     ALT                 11 IU/L   2012 Unknown

 

          COMPREHENSIVE METABOLIC 01255     BUN                 19 MG/DL  2012 Unknown

 

          COMPREHENSIVE METABOLIC 64737     ALBUMIN             4.3 GM/DL 2012 Unknown

 

          COMPREHENSIVE METABOLIC 65542     CHLORIDE            109 MMOL/L  Unknown

 

          COMPREHENSIVE METABOLIC 73424     BILI TOT            0.6 MG/DL 2012 Unknown

 

          COMPREHENSIVE METABOLIC 87580     ALK PHOS            84 U/L    2012 Unknown

 

          COMPREHENSIVE METABOLIC 16045     SODIUM              142 MMOL/L  Unknown

 

          COMPREHENSIVE METABOLIC 31745     CREATININE           1.09 MG/DL  Unknown

 

          COMPREHENSIVE METABOLIC 47391     CALCIUM             9.8 MG/DL 2012 Unknown

 

          COMPREHENSIVE METABOLIC 18970     POTASSIUM           4.2 MMOL/L  Unknown

 

          COMPREHENSIVE METABOLIC 87690     PROT TOT            6.4 GM/DL 2012 Unknown

 

          COMPREHENSIVE METABOLIC 58873     Glucose             89 MG/DL  2012 Unknown

 

          COMPREHENSIVE METABOLIC 15711     BICARB              25 MMOL/L 2012 Unknown

 

          COMPREHENSIVE METABOLIC 98761     ANION GAP           8 MEQ/L   2012 Unknown

 

          GFR CALC  5205597   GFR AA              60.0L ML/MIN 2012 Unknow

n

 

          GFR CALC  8721889   GFR NON-AA           49.0L ML/MIN 2012 Unkno

wn

 

          THYROID STIMULATING HORMONE 64355     TSH                 2.450 uIU/ML

 2012 Unknown

 

          COMPREHENSIVE METABOLIC 23303     AST                 22 U/L    2012 Unknown

 

          COMPREHENSIVE METABOLIC 01859     ALT                 14 IU/L   2012 Unknown

 

          COMPREHENSIVE METABOLIC 00262     BUN                 21 MG/DL  2012 Unknown

 

          COMPREHENSIVE METABOLIC 93224     ALBUMIN             4.3 GM/DL 2012 Unknown

 

          COMPREHENSIVE METABOLIC 65278     CHLORIDE            106 MMOL/L  Unknown

 

          COMPREHENSIVE METABOLIC 55597     BILI TOT            0.4 MG/DL 2012 Unknown

 

          COMPREHENSIVE METABOLIC 98796     ALK PHOS            80 U/L    2012 Unknown

 

          COMPREHENSIVE METABOLIC 16254     SODIUM              141 MMOL/L  Unknown

 

          COMPREHENSIVE METABOLIC 00989     CREATININE           1.13 MG/DL  Unknown

 

          COMPREHENSIVE METABOLIC 57391     CALCIUM             9.4 MG/DL 2012 Unknown

 

          COMPREHENSIVE METABOLIC 73365     POTASSIUM           4.3 MMOL/L  Unknown

 

          COMPREHENSIVE METABOLIC 25272     PROT TOT            6.7 GM/DL 2012 Unknown

 

          COMPREHENSIVE METABOLIC 64905     Glucose             98 MG/DL  2012 Unknown

 

          COMPREHENSIVE METABOLIC 00761     BICARB              25 MMOL/L 2012 Unknown

 

          COMPREHENSIVE METABOLIC 98037     ANION GAP           10 MEQ/L  2012 Unknown

 

          LIPID GROUP [id number]     HDL TEST            44 MG/DL  2012 Unknown

 

          LIPID GROUP [id number]     TRIG                164 MG/DL 2012 Unknown

 

          LIPID GROUP [id number]     TEST LDL            98 MG/DL  2012 Unknown

 

          LIPID GROUP [id number]     CHOL                175 MG/DL 2012 Unknown

 

          LIPID GROUP [id number]     RCHOL/HDL           3.98 RATIO 2012 Unknow

n

 

          COMPLETE BLOOD COUNT 92291     WBC                 6.7 10e9/L 20

12 Unknown

 

          COMPLETE BLOOD COUNT 40369     RBC                 4.36 10e12/L 2012 Unknown

 

          COMPLETE BLOOD COUNT 61869     HGB                 12.9 g/dL 

2 Unknown

 

          COMPLETE BLOOD COUNT 59717     HCT DET             39.4 %    

2 Unknown

 

          COMPLETE BLOOD COUNT 71224     MCV                 90.4 fL   

2 Unknown

 

          COMPLETE BLOOD COUNT 70635     MCH                 29.6 pg   

2 Unknown

 

          COMPLETE BLOOD COUNT 45318     MCHC                32.7 g/dL 

2 Unknown

 

          COMPLETE BLOOD COUNT 07724     PLT                 184 10e9/L 20

12 Unknown

 

          COMPLETE BLOOD COUNT 67606     MPV                 10.9 fL   

2 Unknown

 

          COMPLETE BLOOD COUNT 86713     ARIK %               41.5 %    

2 Unknown

 

          COMPLETE BLOOD COUNT 80238     LY %                45.7 %    

2 Unknown

 

          COMPLETE BLOOD COUNT 49694     MON %               9.4 %     

2 Unknown

 

          COMPLETE BLOOD COUNT 37106     EOS  %              3.0 %     

2 Unknown

 

          COMPLETE BLOOD COUNT 34460     BASO %              0.4 %     

2 Unknown

 

          COMPLETE BLOOD COUNT 60584     RDW                 13.2 %    

2 Unknown

 

          COMPLETE BLOOD COUNT 97925     ABS ARIK             2.78 10e9/L 

012 Unknown

 

          COMPLETE BLOOD COUNT 91251     ABS LYMPH           3.06 10e9/L 

012 Unknown

 

          COMPLETE BLOOD COUNT 02689     ABS MONO            0.63 10e9/L 

012 Unknown

 

          COMPLETE BLOOD COUNT 44253     ABS EOS             0.20 10e9/L 

012 Unknown

 

          COMPLETE BLOOD COUNT 61148     ABS BASO            0.03 10e9/L 

012 Unknown

 

          COMPLETE BLOOD COUNT 85035     RDW-SD              42.3 fL   

2 Unknown

 

          GFR CALC  5871955   GFR AA              57.0L ML/MIN 2012 Unknow

n

 

          GFR CALC  8860369   GFR NON-AA           47.0L ML/MIN 2012 Unkno

wn

 

          THYROID STIMULATING HORMONE 29958     TSH                 2.663 uIU/ML

 2012 Unknown

 

          FREE T4   84627     FREE T4             1.15 NG/DL 2012 Unknown

 

          THYROID STIMULATING HORMONE 25450     TSH                 1.908 uIU/ML

 2011 Unknown

 

          COMPLETE BLOOD COUNT 78131     WBC                 6.4 10e9/L 20

11 Unknown

 

          COMPLETE BLOOD COUNT 44730     RBC                 3.92 10e12/L 2011 Unknown

 

          COMPLETE BLOOD COUNT 78282     HGB                 11.8 g/dL 

1 Unknown

 

          COMPLETE BLOOD COUNT 02093     HCT DET             36.0 %    

1 Unknown

 

          COMPLETE BLOOD COUNT 79799     MCV                 91.8 fL   

1 Unknown

 

          COMPLETE BLOOD COUNT 01071     MCH                 30.1 pg   

1 Unknown

 

          COMPLETE BLOOD COUNT 79758     MCHC                32.8 g/dL 

1 Unknown

 

          COMPLETE BLOOD COUNT 57307     PLT                 176 10e9/L 20

11 Unknown

 

          COMPLETE BLOOD COUNT 66139     MPV                 11.4 fL   

1 Unknown

 

          COMPLETE BLOOD COUNT 82276     ARIK %               50.4 %    

1 Unknown

 

          COMPLETE BLOOD COUNT 21827     LY %                35.5 %    

1 Unknown

 

          COMPLETE BLOOD COUNT 13002     MON %               10.2 %    

1 Unknown

 

          COMPLETE BLOOD COUNT 45262     EOS  %              3.3 %     

1 Unknown

 

          COMPLETE BLOOD COUNT 96169     BASO %              0.6 %     

1 Unknown

 

          COMPLETE BLOOD COUNT 16217     RDW                 13.7 %    

1 Unknown

 

          COMPLETE BLOOD COUNT 05670     ABS ARIK             3.23 10e9/L 

011 Unknown

 

          COMPLETE BLOOD COUNT 07851     ABS LYMPH           2.27 10e9/L 

011 Unknown

 

          COMPLETE BLOOD COUNT 91108     ABS MONO            0.65 10e9/L 

011 Unknown

 

          COMPLETE BLOOD COUNT 59379     ABS EOS             0.21 10e9/L 

011 Unknown

 

          COMPLETE BLOOD COUNT 19364     ABS BASO            0.04 10e9/L 

011 Unknown

 

          COMPLETE BLOOD COUNT 05335     RDW-SD              45.3 fL   

1 Unknown

 

          GFR CALC  0621180   GFR AA              >60 ML/MIN 2011 Unknown

 

          GFR CALC  1959337   GFR NON-AA           53.0L ML/MIN 2011 Unkno

wn

 

          FREE T4   58134     FREE T4             1.20 NG/DL 2011 Unknown

 

          COMPREHENSIVE METABOLIC 19595     AST                 17 U/L    2011 Unknown

 

          COMPREHENSIVE METABOLIC 60137     ALT                 9 IU/L    2011 Unknown

 

          COMPREHENSIVE METABOLIC 31284     BUN                 16 MG/DL  2011 Unknown

 

          COMPREHENSIVE METABOLIC 17865     ALBUMIN             4.0 GM/DL 2011 Unknown

 

          COMPREHENSIVE METABOLIC 81777     CHLORIDE            108 MMOL/L  Unknown

 

          COMPREHENSIVE METABOLIC 14222     BILI TOT            0.5 MG/DL 2011 Unknown

 

          COMPREHENSIVE METABOLIC 22273     ALK PHOS            76 U/L    2011 Unknown

 

          COMPREHENSIVE METABOLIC 90695     SODIUM              139 MMOL/L  Unknown

 

          COMPREHENSIVE METABOLIC 15312     CREATININE           1.02 MG/DL 2011 Unknown

 

          COMPREHENSIVE METABOLIC 70344     CALCIUM             9.2 MG/DL 2011 Unknown

 

          COMPREHENSIVE METABOLIC 42929     POTASSIUM           4.5 MMOL/L  Unknown

 

          COMPREHENSIVE METABOLIC 98894     PROT TOT            6.1 GM/DL 2011 Unknown

 

          COMPREHENSIVE METABOLIC 91675     Glucose             93 MG/DL  2011 Unknown

 

          COMPREHENSIVE METABOLIC 13281     BICARB              26 MMOL/L 2011 Unknown

 

          COMPREHENSIVE METABOLIC 28822     ANION GAP           5 MEQ/L   2011 Unknown

 

          LIPID GROUP 73882     HDL TEST            46 MG/DL  2011 Unknown

 

          LIPID GROUP 66392     TRIG                102 MG/DL 2011 Unknown

 

          LIPID GROUP 13199     TEST LDL            88 MG/DL  2011 Unknown

 

          LIPID GROUP 67756     CHOL                154 MG/DL 2011 Unknown

 

          LIPID GROUP 42230     RCHOL/HDL           3.35 RATIO 2011 Unknow

n







Procedures





                    Procedure           Codes               Date

 

                    ROUTINE VENIPUNCTURE CPT-4: 85232        2020

 

                    ASSAY THYROID STIM HORMONE CPT-4: 06452        2020

 

                    COMPLETE CBC W/AUTO DIFF WBC CPT-4: 85981        2020

 

                    COMPREHEN METABOLIC PANEL CPT-4: 77643        2020

 

                    ROUTINE VENIPUNCTURE CPT-4: 69479        2019

 

                    LIPID PANEL         CPT-4: 74877        2019

 

                    FLU VACC PRSV FREE INC ANTIG 65 AND OLDER CPT-4: 15782      

  10/22/2019

 

                    FLU VACC PRSV FREE INC ANTIG 65 AND OLDER CPT-4: 98673      

  10/22/2019

 

                    ADMIN INFLUENZA VIRUS VAC CPT-4:         10/22/2019

 

                    COMPREHEN METABOLIC PANEL CPT-4: 94200        10/11/2019

 

                    ROUTINE VENIPUNCTURE CPT-4: 65396        10/11/2019

 

                    ROUTINE VENIPUNCTURE CPT-4: 44762        06/10/2019

 

                    ASSAY THYROID STIM HORMONE CPT-4: 05650        06/10/2019

 

                    COMPREHEN METABOLIC PANEL CPT-4: 25114        06/10/2019

 

                    COMPLETE CBC W/AUTO DIFF WBC CPT-4: 81063        06/10/2019

 

                    URINALYSIS NONAUTO W/O SCOPE CPT-4: 03114        2019

 

                    URINE CULTURE/ COLONY COUNT CPT-4: 20134        2019

 

                    URINE CULTURE/ COLONY COUNT CPT-4: 63199        10/02/2018

 

                    ROUTINE VENIPUNCTURE CPT-4: 77141        2018

 

                    ASSAY OF FREE THYROXINE CPT-4: 03101        2018

 

                    ASSAY THYROID STIM HORMONE CPT-4: 05642        2018

 

                    COMPLETE CBC W/AUTO DIFF WBC CPT-4: 15098        2018

 

                    METABOLIC PANEL TOTAL CA CPT-4: 42169        2018

 

                    FLU VACC PRSV FREE INC ANTIG 65 AND OLDER CPT-4: 49065      

  2018

 

                    ASSAY, GLUCOSE, BLOOD QUANT CPT-4: 28004        2018

 

                    ADMIN INFLUENZA VIRUS VAC CPT-4:         2018

 

                    ROUTINE VENIPUNCTURE CPT-4: 07137        2018

 

                    COMPREHEN METABOLIC PANEL CPT-4: 84326        2018

 

                    COMPLETE CBC W/AUTO DIFF WBC CPT-4: 31048        2018

 

                    A1C HPLC            CPT-4: 31068        2018

 

                    ASSAY OF TROPONIN QUANT CPT-4: 35682        2018

 

                    LIPID PANEL         CPT-4: 59331        2018

 

                    THER/PROPH/DIAG INJ SC/IM CPT-4: 69133        2018

 

                    TRIAMCINOLONE ACET INJ NOS CPT-4:         2018

 

                    URINALYSIS NONAUTO W/O SCOPE CPT-4: 05019        2018

 

                    URINE CULTURE/ COLONY COUNT CPT-4: 97467        2018

 

                    FLU VACC PRSV FREE INC ANTIG 65 AND OLDER CPT-4: 51589      

  10/06/2017

 

                    ADMIN INFLUENZA VIRUS VAC CPT-4:         10/06/2017

 

                    ROUTINE VENIPUNCTURE CPT-4: 32014        2017

 

                    COMPREHEN METABOLIC PANEL CPT-4: 89736        2017

 

                    COMPLETE CBC W/AUTO DIFF WBC CPT-4: 69846        2017

 

                    LIPID PANEL         CPT-4: 09524        2017

 

                    A1C HPLC            CPT-4: 79088        2017

 

                    ASSAY THYROID STIM HORMONE CPT-4: 47357        2017

 

                    ROUTINE VENIPUNCTURE CPT-4: 33609        2017

 

                    ASSAY THYROID STIM HORMONE CPT-4: 64431        2017

 

                    COMPREHEN METABOLIC PANEL CPT-4: 79828        2017

 

                    COMPLETE CBC W/AUTO DIFF WBC CPT-4: 76651        2017

 

                    A1C HPLC            CPT-4: 17790        2017

 

                    FLU VACC PRSV FREE INC ANTIG 65 AND OLDER CPT-4: 49349      

  10/07/2016

 

                    ADMIN INFLUENZA VIRUS VAC CPT-4:         10/07/2016

 

                    ROUTINE VENIPUNCTURE CPT-4: 47490        2016

 

                    ASSAY OF FREE THYROXINE CPT-4: 20771        2016

 

                    ASSAY THYROID STIM HORMONE CPT-4: 44082        2016

 

                    COMPREHEN METABOLIC PANEL CPT-4: 48942        2016

 

                    COMPLETE CBC W/AUTO DIFF WBC CPT-4: 69095        2016

 

                    LIPID PANEL         CPT-4: 14788        2016

 

                    A1C HPLC            CPT-4: 80114        2016

 

                    URINALYSIS NONAUTO W/O SCOPE CPT-4: 77187        2016

 

                    ROUTINE VENIPUNCTURE CPT-4: 11556        2015

 

                    METABOLIC PANEL TOTAL CA CPT-4: 20275        2015

 

                    PRESCRIP TRANSMIT VIA ERX SY CPT-4:         2015

 

                    FLU VACC PRSV FREE INC ANTIG 65 AND OLDER CPT-4: 29357      

  10/16/2015

 

                    ADMIN INFLUENZA VIRUS VAC CPT-4:         10/16/2015

 

                    URINALYSIS NONAUTO W/O SCOPE CPT-4: 50442        09/15/2015

 

                    URINE CULTURE/ COLONY COUNT CPT-4: 51412        09/15/2015

 

                    ROUTINE VENIPUNCTURE CPT-4: 05074        09/10/2015

 

                    ASSAY OF FREE THYROXINE CPT-4: 78096        09/10/2015

 

                    ASSAY THYROID STIM HORMONE CPT-4: 17263        09/10/2015

 

                    COMPREHEN METABOLIC PANEL CPT-4: 13006        09/10/2015

 

                    COMPLETE CBC W/AUTO DIFF WBC CPT-4: 42621        09/10/2015

 

                    LIPID PANEL         CPT-4: 43562        09/10/2015

 

                    A1C HPLC            CPT-4: 42664        09/10/2015

 

                    CERUM REMOVAL       CPT-4: 00964        2015

 

                    PRESCRIP TRANSMIT VIA ERX SY CPT-4:         2015

 

                    FLUZONE, 5ML (Medicare) CPT-4:         10/17/2014

 

                    ADMIN INFLUENZA VIRUS VAC CPT-4:         10/17/2014

 

                    PRESCRIP TRANSMIT VIA ERX SY CPT-4:         2014

 

                    PRESCRIP TRANSMIT VIA ERX SY CPT-4:         2014

 

                    PRESCRIP TRANSMIT VIA ERX SY CPT-4:         2014

 

                    ROUTINE VENIPUNCTURE CPT-4: 50804        2014

 

                    ASSAY OF FREE THYROXINE CPT-4: 25438        2014

 

                    ASSAY THYROID STIM HORMONE CPT-4: 18801        2014

 

                    COMPREHEN METABOLIC PANEL CPT-4: 99275        2014

 

                    COMPLETE CBC W/AUTO DIFF WBC CPT-4: 78337        2014

 

                    LIPID PANEL         CPT-4: 90173        2014

 

                    A1C HPLC            CPT-4: 03154        2014

 

                    VITAMIN B 12 FOLIC ACID CPT-4: 05501|80442  2014

 

                    PRESCRIP TRANSMIT VIA ERX SY CPT-4:         2014

 

                    PRESCRIP TRANSMIT VIA ERX SY CPT-4:         10/15/2013

 

                    FLUZONE, 5ML (Medicare) CPT-4:         2013

 

                    ADMIN INFLUENZA VIRUS VAC CPT-4:         2013

 

                    PRESCRIP TRANSMIT VIA ERX SY CPT-4:         2013

 

                    PRESCRIP TRANSMIT VIA ERX SY CPT-4:         05/10/2013

 

                    ROUTINE VENIPUNCTURE CPT-4: 79761        2012

 

                    ASSAY OF FREE THYROXINE CPT-4: 72409        2012

 

                    ASSAY THYROID STIM HORMONE CPT-4: 92779        2012

 

                    COMPREHEN METABOLIC PANEL CPT-4: 99143        2012

 

                    COMPLETE CBC W/AUTO DIFF WBC CPT-4: 39251        2012

 

                    LIPID PANEL         CPT-4: 06562        2012

 

                    A1C GLYCOSYLATED HEMOGLOBIN TEST CPT-4: 39263        

012

 

                    CERUM REMOVAL       CPT-4: 18351        2012

 

                    PRESCRIP TRANSMIT VIA ERX SY CPT-4:         2012

 

                    PRESCRIP TRANSMIT VIA ERX SY CPT-4:         10/10/2012

 

                    FLUZONE, 5ML (Medicare) CPT-4:         2012

 

                    ADMIN INFLUENZA VIRUS VAC CPT-4:         2012

 

                    ASSAY, GLUCOSE, BLOOD QUANT CPT-4: 04429        2012

 

                    URINALYSIS NONAUTO W/O SCOPE CPT-4: 83440        2012

 

                    URINE CULTURE/ COLONY COUNT CPT-4: 11896        2012

 

                    ROUTINE VENIPUNCTURE CPT-4: 27299        2012

 

                    ASSAY OF FREE THYROXINE CPT-4: 97825        2012

 

                    ASSAY THYROID STIM HORMONE CPT-4: 24373        2012

 

                    COMPREHEN METABOLIC PANEL CPT-4: 26330        2012

 

                    COMPLETE CBC W/AUTO DIFF WBC CPT-4: 64577        2012

 

                    LIPID PANEL         CPT-4: 88490        2012

 

                    ASSAY OF INSULIN    CPT-4: 89032        2012

 

                    A1C GLYCOSYLATED HEMOGLOBIN TEST CPT-4: 07222        

012

 

                    DRAIN/INJECT JOINT/BURSA CPT-4: 43155        2012

 

                    METHYLPREDNISOLONE 40 MG INJ CPT-4:         2012

 

                    TRIAMCINOLONE ACET INJ NOS CPT-4:         2012

 

                    PRESCRIP TRANSMIT VIA ERX SY CPT-4:         2012

 

                    PRESCRIP TRANSMIT VIA ERX SY CPT-4:         2012

 

                    METHYLPREDNISOLONE 40 MG INJ CPT-4:         2012

 

                    DRAIN/INJECT JOINT/BURSA CPT-4: 60538        2012

 

                    TRIAMCINOLONE ACET INJ NOS CPT-4:         2012

 

                    PRESCRIP TRANSMIT VIA ERX SY CPT-4:         2012

 

                    ROUTINE VENIPUNCTURE CPT-4: 80027        2012

 

                    ASSAY OF FREE THYROXINE CPT-4: 18976        2012

 

                    ASSAY THYROID STIM HORMONE CPT-4: 52935        2012

 

                    COMPREHEN METABOLIC PANEL CPT-4: 50009        2012

 

                    COMPLETE CBC W/AUTO DIFF WBC CPT-4: 54087        2012

 

                    LIPID PANEL         CPT-4: 16880        2012

 

                    PRESCRIP TRANSMIT VIA ERX SY CPT-4:         2012

 

                    CERUM REMOVAL       CPT-4: 78407        2011

 

                    PRESCRIP TRANSMIT VIA ERX SY CPT-4:         2011

 

                    FLUZONE, 5ML (Medicare) CPT-4:         10/05/2011

 

                    ADMIN INFLUENZA VIRUS VAC CPT-4:         10/05/2011

 

                    PRESCRIP TRANSMIT VIA ERX SY CPT-4:         2011

 

                    URINALYSIS NONAUTO W/O SCOPE CPT-4: 80908        2011

 

                    URINE CULTURE/ COLONY COUNT CPT-4: 24544        2011

 

                    CUR TOBACCO NON-USER CPT-4:         2011

 

                    ROUTINE VENIPUNCTURE CPT-4: 83554        2011

 

                    COMPLETE CBC W/AUTO DIFF WBC CPT-4: 59781        2011

 

                    COMPREHEN METABOLIC PANEL CPT-4: 38626        2011

 

                    LIPID PANEL         CPT-4: 66487        2011

 

                    ASSAY THYROID STIM HORMONE CPT-4: 41224        2011

 

                    ASSAY OF FREE THYROXINE CPT-4: 91239        2011

 

                    PRESCRIP TRANSMIT VIA ERX SY CPT-4:         2011

 

                    INJ TRIGGER POINT 1/2 MUSCL CPT-4: 82804        2011

 

                    TRIAMCINOLONE ACET INJ NOS CPT-4:         2011

 

                    METHYLPREDNISOLONE 40 MG INJ CPT-4:         2011

 

                    THER/PROPH/DIAG INJ SC/IM CPT-4: 37911        2011

 

                    KETOROLAC TROMETHAMINE INJ CPT-4:         2011

 

                    PRESCRIP TRANSMIT VIA ERX SY CPT-4:         2010

 

                    FLU VACCINE 3 YRS & > IM UP 64 CPT-4: 73266        10/06/201

0

 

                    ADMIN INFLUENZA VIRUS VAC CPT-4:         10/06/2010

 

                    URINALYSIS NONAUTO W/O SCOPE CPT-4: 66964        2010

 

                    URINE CULTURE/ COLONY COUNT CPT-4: 86055        2010

 

                    PRESCRIP TRANSMIT VIA ERX SY CPT-4:         2010

 

                    THER/PROPH/DIAG INJ SC/IM CPT-4: 65439        2010

 

                    VITAMIN B12 INJECTION CPT-4:         2010

 

                    THER/PROPH/DIAG INJ SC/IM CPT-4: 76005        2010

 

                    VITAMIN B12 INJECTION CPT-4:         2010

 

                    ROUTINE VENIPUNCTURE CPT-4: 04652        2010







Vital Signs





                          Date                      Vital

 

                2020      Blood Pressure 1: 138/64 Code: 8480-6 Heart Rate

 1: 52 bpm 

Respiratory Rate: 18 bpm  SpO2: 98%                 Temperature: 36.3 (C) / 97.4

 (F)

 

                    2020          Blood Pressure 1: 144/82 Code: 8480-6 He

art Rate 1: 56 bpm

 

                2020      Blood Pressure 1: 154/86 Code: 8480-6 Heart Rate

 1: 64 bpm 

Respiratory Rate: 20 bpm SpO2: 99%           Temperature: 37.1 (C) / 98.7 (F) We

ight: 215 

lbs 

 

             2019   Blood Pressure 1: 140/70 Code: 8480-6 Heart Rate 1: 57

 bpm SpO2: 99%    

Temperature: 35.9 (C) / 96.6 (F)        Weight: 215 lbs 

 

                06/10/2019      Blood Pressure 1: 144/70 Code: 8480-6 Heart Rate

 1: 60 bpm 

Respiratory Rate: 20 bpm SpO2: 95%           Temperature: 36.4 (C) / 97.6 (F) We

ight: 214 

lbs 

 

                2019      Blood Pressure 1: 128/78 Code: 8480-6 Heart Rate

 1: 56 bpm 

Respiratory Rate: 20 bpm SpO2: 98%           Temperature: 36.3 (C) / 97.4 (F) We

ight: 213 

lbs 

 

                2018      Blood Pressure 1: 142/60 Code: 8480-6 BMI: 38.2 

Code: 27578-9 Heart 

Rate 1: 48 bpm  Height: 5'2"    Respiratory Rate: 20 bpm SpO2: 98%       Tempera

ture: 36.7

(C) / 98.1 (F)                          Weight: 212 lbs 

 

                2018      Blood Pressure 1: 142/64 Code: 8480-6 BMI: 38.5 

Code: 34236-7 Heart 

Rate 1: 52 bpm  Height: 5'2"    Respiratory Rate: 22 bpm SpO2: 96%       Tempera

ture: 36.1

(C) / 96.9 (F)                          Weight: 214 lbs 

 

                10/02/2018      Blood Pressure 1: 124/80 Code: 8480-6 BMI: 38.3 

Code: 13696-3 Heart 

Rate 1: 68 bpm      Height: 5'2"        Respiratory Rate: 20 bpm Temperature: 36

.3 (C) / 

97.4 (F)                                Weight: 213 lbs 

 

                2018      Blood Pressure 1: 132/78 Code: 8480-6 BMI: 37.6 

Code: 17445-0 Heart 

Rate 1: 68 bpm  Height: 5'2"    Respiratory Rate: 20 bpm SpO2: 97%       Tempera

ture: 36.8

(C) / 98.2 (F)                          Weight: 209 lbs 

 

                2018      Blood Pressure 1: 150/76 Code: 8480-6 BMI: 38.5 

Code: 01911-6 Heart 

Rate 1: 64 bpm  Height: 5'2"    Respiratory Rate: 20 bpm SpO2: 97%       Tempera

ture: 36.2

(C) / 97.2 (F)                          Weight: 214 lbs 

 

                2018      Blood Pressure 1: 122/74 Code: 8480-6 BMI: 38.2 

Code: 70804-6 Heart 

Rate 1: 64 bpm  Height: 5'2"    Respiratory Rate: 18 bpm SpO2: 96%       Tempera

ture: 35.8

(C) / 96.4 (F)                          Weight: 212 lbs 

 

                2018      Blood Pressure 1: 124/78 Code: 8480-6 BMI: 37.8 

Code: 25414-2 Heart 

Rate 1: 76 bpm      Height: 5'2"        Respiratory Rate: 20 bpm Temperature: 36

.8 (C) / 

98.3 (F)                                Weight: 210 lbs 

 

                2018      Blood Pressure 1: 136/70 Code: 8480-6 BMI: 38.0 

Code: 24010-0 Heart 

Rate 1: 68 bpm  Height: 5'2"    Respiratory Rate: 20 bpm SpO2: 97%       Tempera

ture: 36.8

(C) / 98.2 (F)                          Weight: 211 lbs 

 

                2018      Blood Pressure 1: 140/65 Code: 8480-6 Heart Rate

 1: 75 bpm 

Respiratory Rate: 24 bpm SpO2: 95%           Temperature: 37.0 (C) / 98.6 (F) We

ight: 211 

lbs 

 

                2018      Blood Pressure 1: 154/70 Code: 8480-6 BMI: 37.6 

Code: 51860-2 Heart 

Rate 1: 76 bpm  Height: 5'2"    Respiratory Rate: 20 bpm SpO2: 98%       Tempera

ture: 36.9

(C) / 98.5 (F)                          Weight: 209 lbs 

 

                2017      Blood Pressure 1: 156/70 Code: 8480-6 BMI: 37.1 

Code: 39040-9 Heart 

Rate 1: 72 bpm  Height: 5'2"    Respiratory Rate: 20 bpm SpO2: 97%       Tempera

ture: 37.0

(C) / 98.6 (F)                          Weight: 206 lbs 

 

                2017      Blood Pressure 1: 152/78 Code: 8480-6 BMI: 36.8 

Code: 54894-3 Heart 

Rate 1: 78 bpm  Height: 5'2"    Respiratory Rate: 20 bpm SpO2: 98%       Tempera

ture: 36.1

(C) / 97.0 (F)                          Weight: 204 lbs 

 

                2017      Blood Pressure 1: 142/70 Code: 8480-6 BMI: 36.9 

Code: 21232-2 Heart 

Rate 1: 64 bpm  Height: 5'2"    Respiratory Rate: 20 bpm SpO2: 96%       Tempera

ture: 36.5

(C) / 97.7 (F)                          Weight: 205 lbs 

 

                2017      Blood Pressure 1: 142/68 Code: 8480-6 Heart Rate

 1: 88 bpm 

Respiratory Rate: 18 bpm SpO2: 98%           Temperature: 36.3 (C) / 97.3 (F) We

ight: 209 

lbs 

 

                2016      Blood Pressure 1: 130/78 Code: 8480-6 Heart Rate

 1: 68 bpm 

Respiratory Rate: 20 bpm SpO2: 95%           Temperature: 36.4 (C) / 97.6 (F) We

ight: 212 

lbs 

 

                2016      Blood Pressure 1: 122/64 Code: 8480-6 BMI: 39.1 

Code: 98368-5 Heart 

Rate 1: 76 bpm      Height: 5'2"        Respiratory Rate: 20 bpm Temperature: 36

.8 (C) / 

98.2 (F)                                Weight: 217 lbs 

 

                2016      Blood Pressure 1: 144/70 Code: 8480-6 BMI: 39.4 

Code: 79654-2 Heart 

Rate 1: 76 bpm      Height: 5'2"        Respiratory Rate: 20 bpm Temperature: 36

.6 (C) / 

97.9 (F)                                Weight: 219 lbs 

 

                2015      Blood Pressure 1: 152/60 Code: 8480-6 BMI: 39.6 

Code: 22027-7 Heart 

Rate 1: 84 bpm      Height: 5'2"        Respiratory Rate: 20 bpm Temperature: 37

.0 (C) / 

98.6 (F)                                Weight: 220 lbs 

 

                2015      Blood Pressure 1: 146/76 Code: 8480-6 BMI: 39.8 

Code: 13495-4 Heart 

Rate 1: 88 bpm      Height: 5'2"        Respiratory Rate: 20 bpm Temperature: 37

.0 (C) / 

98.6 (F)                                Weight: 221 lbs 

 

                2015      Blood Pressure 1: 132/70 Code: 8480-6 BMI: 39.1 

Code: 87682-3 Heart 

Rate 1: 88 bpm      Height: 5'2"        Respiratory Rate: 20 bpm Temperature: 36

.4 (C) / 

97.6 (F)                                Weight: 217 lbs 

 

                2015      Blood Pressure 1: 132/66 Code: 8480-6 BMI: 39.9 

Code: 97537-6 Heart 

Rate 1: 72 bpm      Height: 5'2"        Respiratory Rate: 20 bpm Temperature: 36

.9 (C) / 

98.4 (F)                                Weight: 218 lbs 

 

                2015      Blood Pressure 1: 136/80 Code: 8480-6 Heart Rate

 1: 76 bpm 

Respiratory Rate: 20 bpm  Temperature: 36.7 (C) / 98.0 (F) Weight: 224 lbs 

 

                2015      Blood Pressure 1: 134/78 Code: 8480-6 BMI: 39.7 

Code: 63243-3 Heart 

Rate 1: 84 bpm      Height: 5'2"        Respiratory Rate: 20 bpm Temperature: 36

.7 (C) / 

98.0 (F)                                Weight: 217 lbs 

 

                2014      Blood Pressure 1: 146/78 Code: 8480-6 BMI: 39.5 

Code: 85500-0 Heart 

Rate 1: 82 bpm      Height: 5'2"        Respiratory Rate: 18 bpm Temperature: 35

.6 (C) / 

96.1 (F)                                Weight: 216 lbs 

 

                2014      Blood Pressure 1: 134/70 Code: 8480-6 BMI: 37.9 

Code: 53166-4 Heart 

Rate 1: 80 bpm      Height: 5'3"        Respiratory Rate: 20 bpm Temperature: 36

.8 (C) / 

98.2 (F)                                Weight: 214 lbs 

 

                2014      Blood Pressure 1: 132/70 Code: 8480-6 BMI: 37.6 

Code: 36321-2 Heart 

Rate 1: 80 bpm      Height: 5'3"        Respiratory Rate: 20 bpm Temperature: 36

.8 (C) / 

98.3 (F)                                Weight: 212 lbs 

 

                2014      Blood Pressure 1: 116/74 Code: 8480-6 Heart Rate

 1: 68 bpm 

Respiratory Rate: 20 bpm  Temperature: 36.2 (C) / 97.1 (F) Weight: 212 lbs 

 

                10/15/2013      Blood Pressure 1: 132/82 Code: 8480-6 BMI: 37.4 

Code: 47754-5 Heart 

Rate 1: 76 bpm      Height: 5'3"        Respiratory Rate: 20 bpm Temperature: 36

.7 (C) / 

98.0 (F)                                Weight: 211 lbs 

 

                    2013          Blood Pressure 1: 130/76 Code: 8480-6 He

art Rate 1: 78 bpm

 

                2013      Blood Pressure 1: 140/82 Code: 8480-6 BMI: 36.8 

Code: 27394-1 Heart 

Rate 1: 66 bpm      Height: 5'3"        Respiratory Rate: 20 bpm Temperature: 36

.1 (C) / 

96.9 (F)                                Weight: 208 lbs 

 

                2013      Blood Pressure 1: 138/80 Code: 8480-6 BMI: 36.4 

Code: 43750-9 Heart 

Rate 1: 72 bpm      Height: 5'4"        Respiratory Rate: 20 bpm Temperature: 36

.7 (C) / 

98.0 (F)                                Weight: 212 lbs 

 

                2013      Blood Pressure 1: 124/70 Code: 8480-6 BMI: 36.9 

Code: 78033-5 Heart 

Rate 1: 60 bpm      Height: 5'4"        Temperature: 36.1 (C) / 97.0 (F) Weight:

 215 lbs 

 

                05/10/2013      Blood Pressure 1: 132/86 Code: 8480-6 BMI: 36.9 

Code: 85753-4 Heart 

Rate 1: 76 bpm      Height: 5'4"        Respiratory Rate: 20 bpm Temperature: 36

.8 (C) / 

98.2 (F)                                Weight: 215 lbs 

 

                2013      Blood Pressure 1: 134/82 Code: 8480-6 BMI: 36.6 

Code: 89999-0 Heart 

Rate 1: 72 bpm      Height: 5'4"        Respiratory Rate: 20 bpm Temperature: 36

.3 (C) / 

97.4 (F)                                Weight: 213 lbs 

 

                2012      Blood Pressure 1: 142/80 Code: 8480-6 BMI: 37.1 

Code: 56112-6 Heart 

Rate 1: 76 bpm      Height: 5'4"        Respiratory Rate: 20 bpm Temperature: 36

.8 (C) / 

98.3 (F)                                Weight: 216 lbs 

 

                10/23/2012      Blood Pressure 1: 128/68 Code: 8480-6 BMI: 37.6 

Code: 46814-2 Heart 

Rate 1: 72 bpm      Height: 5'4"        Respiratory Rate: 20 bpm Temperature: 36

.6 (C) / 

97.9 (F)                                Weight: 219 lbs 

 

                10/10/2012      Blood Pressure 1: 122/70 Code: 8480-6 Heart Rate

 1: 76 bpm 

Respiratory Rate: 20 bpm  Temperature: 36.7 (C) / 98.1 (F) Weight: 218 lbs 

 

                    2012          Blood Pressure 1: 132/80 Code: 8480-6 He

art Rate 1: 84 bpm

 

                2012      Blood Pressure 1: 128/78 Code: 8480-6 BMI: 38.1 

Code: 42981-6 Heart 

Rate 1: 84 bpm      Height: 5'4"        Respiratory Rate: 20 bpm Temperature: 36

.9 (C) / 

98.4 (F)                                Weight: 222 lbs 

 

                2012      Blood Pressure 1: 138/80 Code: 8480-6 BMI: 37.9 

Code: 04015-1 Heart 

Rate 1: 74 bpm      Height: 5'4"        Temperature: 36.1 (C) / 97.0 (F) Weight:

 221 lbs 

 

                2012      Blood Pressure 1: 126/78 Code: 8480-6 BMI: 38.4 

Code: 97492-4 Heart 

Rate 1: 72 bpm      Height: 5'4"        Respiratory Rate: 20 bpm Temperature: 36

.7 (C) / 

98.0 (F)                                Weight: 224 lbs 

 

                2012      Blood Pressure 1: 138/72 Code: 8480-6 BMI: 38.4 

Code: 59198-9 Heart 

Rate 1: 72 bpm      Height: 5'4"        Respiratory Rate: 20 bpm Temperature: 36

.6 (C) / 

97.9 (F)                                Weight: 224 lbs 

 

                2012      Blood Pressure 1: 122/78 Code: 8480-6 BMI: 38.8 

Code: 91118-2 Heart 

Rate 1: 88 bpm      Height: 5'4"        Respiratory Rate: 20 bpm Temperature: 36

.6 (C) / 

97.8 (F)                                Weight: 226 lbs 

 

                2012      Blood Pressure 1: 116/60 Code: 8480-6 BMI: 38.1 

Code: 18809-3 Heart 

Rate 1: 92 bpm      Height: 5'4"        Respiratory Rate: 20 bpm Temperature: 36

.8 (C) / 

98.2 (F)                                Weight: 222 lbs 

 

                2011      Blood Pressure 1: 118/62 Code: 8480-6 BMI: 37.9 

Code: 62160-7 Heart 

Rate 1: 80 bpm      Height: 5'4"        Temperature: 36.5 (C) / 97.7 (F) Weight:

 221 lbs 

 

                2011      Blood Pressure 1: 132/70 Code: 8480-6 Heart Rate

 1: 84 bpm 

Respiratory Rate: 20 bpm  Temperature: 36.7 (C) / 98.0 (F) Weight: 221 lbs 

 

                2011      Blood Pressure 1: 114/72 Code: 8480-6 BMI: 37.6 

Code: 59494-6 Heart 

Rate 1: 76 bpm      Height: 5'4"        Respiratory Rate: 20 bpm Temperature: 36

.8 (C) / 

98.3 (F)                                Weight: 219 lbs 

 

                    2011          Blood Pressure 1: 136/76 Code: 8480-6 Te

mperature: 36.0 (C) / 96.8 

(F)                                     Weight: 219 lbs 8 oz

 

                2011      Blood Pressure 1: 128/80 Code: 8480-6 Heart Rate

 1: 72 bpm 

Temperature: 36.3 (C) / 97.4 (F)        Weight: 218 lbs 

 

                2011      Blood Pressure 1: 126/70 Code: 8480-6 Heart Rate

 1: 72 bpm 

Temperature: 36.7 (C) / 98.0 (F)

 

                    2010          Blood Pressure 1: 126/78 Code: 8480-6 Te

mperature: 36.2 (C) / 97.1 

(F)                                     Weight: 217 lbs 8 oz

 

                2010      Blood Pressure 1: 116/70 Code: 8480-6 Heart Rate

 1: 72 bpm 

Temperature: 36.4 (C) / 97.6 (F)        Weight: 222 lbs 

 

                2010      Blood Pressure 1: 114/78 Code: 8480-6 Heart Rate

 1: 72 bpm 

Temperature: 37.1 (C) / 98.8 (F)        Weight: 225 lbs 

 

                2010      Blood Pressure 1: 128/78 Code: 8480-6 Heart Rate

 1: 76 bpm 

Temperature: 36.6 (C) / 97.8 (F)        Weight: 224 lbs 

 

                2010      Blood Pressure 1: 116/78 Code: 8480-6 Heart Rate

 1: 80 bpm 

Temperature: 36.4 (C) / 97.6 (F)        Weight: 226 lbs 

 

                2010      Blood Pressure 1: 120/70 Code: 8480-6 BMI: 38.3 

Code: 13996-1 Heart 

Rate 1: 88 bpm      Height: 5'5"        Temperature: 36.4 (C) / 97.6 (F) Weight:

 230 lbs 







Functional Status

No Functional Status data



Reason For Visit





                    Reason For Visit    Effective Dates     Notes

 

                    follow up           2020           

 

                    blood pressure check 2020           

 

                    high blood pressure 2020           

 

                    lab draw            2019           

 

                    lab draw            10/11/2019           

 

                    hearing loss        2019          left ear

 

                    follow up           06/10/2019           

 

                    follow up           2019          Patient has upcoming

 appointment with Dr Claire and carotid 

dopplers tomorrow

 

                    follow up           2018          Patient had heart ca

th on 10-30-18 and one of the bypass 

veins in spasming

 

                    follow up           2018          4 Week

 

                    follow up           10/02/2018           

 

                    follow up           2018           

 

                    high blood pressure 2018          Dr Claire has ordered

 holter monitor and 

hydralazine 50mg PRN

 

                    dizziness           2018          On  morning darren delvalle woke up and went to the bathroom 

and after lying down became very dizzy. Patient has hx of vertigo so didn't 
think anything of it. She usually just has them intermittently, but had more 
that day. While at [employer] began to feel very ill and became very shaky. She got 
home and sat in the recliner and had the jittery/nervous feeling. Later that 
night it finally resolved. Patient feels like she had a spell like this around 
the  and thought it was due to extreme heat exhaustion.

 

                    follow up           2018           

 

                    back pain           2018           

 

                    otalgia             2018           

 

                    back pain           2018           

 

                    injection(s)        10/06/2017          flu shot

 

                    lab draw            2017           

 

                    follow up           2017           

 

                    pelvic pain         2017          Patient states pain 

started in May with a "Constant Ache"

to left inguinal area. Patient states at that time pain was intermittent. Then, 
approximately 2nd week  of  pain worsened and radiates to left low back 
area.

 

                    lab draw            2017           

 

                    hyperglycemia       2017           

 

                    cough               2017           

 

                    otalgia             2016           

 

                    injection(s)        10/07/2016          Influenza

 

                    lab draw            2016           

 

                    constipation        2016           

 

                    flank pain          2016           

 

                    follow up           2015          3mo fwup

 

                    injection(s)        10/16/2015          Influenza

 

                    acute renal failure 09/15/2015           

 

                    lab draw            09/10/2015           

 

                    fatigue             2015           

 

                    otalgia             2015           

 

                    pain, limb          2015           

 

                    follow up           2015          [facility] fw

 

                    high blood pressure 2015           

 

                    injection(s)        10/17/2014          flu shot

 

                    sinusitis           2014           

 

                    high blood pressure 2014           

 

                    cough               2014          Was panfilo at Dr Clements

e's office so he started he on amlodipine 

10mg but seems to be dragging her down. Patient decreased to 1/2 tablet this 
last week

 

                    lab draw            2014           

 

                    cough               2014           

 

                    cough               10/15/2013           

 

                    high blood pressure 2013           

 

                    follow up           2013          ER

 

                    dizziness           2013           

 

                    follow up           2013           

 

                    otalgia             05/10/2013           

 

                    breast complaint    2013           

 

                    lab draw            2012           

 

                    follow up           2012          2mo fwup

 

                    follow up           10/23/2012          2wk fwup

 

                    gas and bloating    10/10/2012          Dr Claire has her mon

itoring because was high in his 

office at last visit

 

                    injection(s)        2012           

 

                    dizziness           2012           

 

                    dizziness           2012           

 

                    lab draw            2012           

 

                    muscle weakness     2012          concerns of simvasta

tin with fatigue and muscle 

weakness

 

                    leg pain/sciatica   2012           

 

                    hip pain            2012           

 

                    back pain           2012          has tried ice pack, 

muscle relaxers, and moist heat

 

                    back pain           2012           

 

                    fatigue             2012          in hands/feet

 

                    otalgia             2011           

 

                    follow up           2011          2wk fwup

 

                    back pain           2011          thinks ulcer may hav

e returned

 

                    lab draw            2011           

 

                    dizziness           2011          Left ear and facial 

pain, right ear pain 

 

                    follow up           2011          1wk fwup

 

                    hip pain            2011          feels very draggy, f

atigue, BP 80/63, normally running 

100's/60's

 

                    chills              2010           

 

                    injection(s)        10/06/2010          flu shot

 

                    follow up           2010          6wk fwup, had HH rep

aired and is starting to feel better, 

started on reglan 10mg tid

 

                    follow up           2010          hosp fwup

 

                    follow up           2010          1mo fwup, saw Dr Danna du and hasn't gave cardiac clearance 

yet for HH repair, did have chemical stress test yesterday and waiting on 
results

 

                    follow up           2010          from EGD/colonoscopy

--has Hiatal Hernia and wants to do 

repair

 

                    follow up           2010           







Encounters





             Encounter    Performer    Location     Codes        Date

 

                                        ([id number]) OFFICE/OUTPATIENT VISIT EST

Diagnosis: Essential (primary) hypertension[ICD10: I10]

Diagnosis: Palpitations[ICD10: R00.2] Akanksha GUNN 

[facility]                    CPT-4: 50181              2020

 

                                        ([id number]) OFFICE/OUTPATIENT VISIT EST

Diagnosis: Essential hypertension[ICD10: I10]

Diagnosis: Palpitations[ICD10: R00.2]

Diagnosis: Stress reaction[ICD10: F43.0] [facility]            CPT-4: 17970              2020

 

                                        ([id number]) NURSE/OUTPATIENT VISIT EST

Diagnosis: Mixed hyperlipidemia[ICD10: E78.2] Akanksha DRIVER [facility]            CPT-4: 28559              2019

 

                                        ([id number]) NURSE/OUTPATIENT VISIT EST

Diagnosis: FLU VACCINE[ICD10: Z23] Akanksha KEY DO 

[facility]                       CPT-4: 26185              10/22/2019

 

                                        ([id number]) NURSE/OUTPATIENT VISIT EST

Diagnosis: Chronic kidney disease, stage 1[ICD10: N18.1] Akanksha DRIVER DO [facility] CPT-4: 84262              10/11/2019

 

                                        (24025) OFFICE/OUTPATIENT VISIT EST

Diagnosis: Acute serous otitis media, left ear[ICD10: H65.02] Nat DRIVER DO [facility] CPT-4: 46520              2019

 

                                        ([id number]) OFFICE/OUTPATIENT VISIT EST

Diagnosis: Essential (primary) hypertension[ICD10: I10]

Diagnosis: Coronary atherosclerosis due to calcified coronary lesion[ICD10: 
I25.84]

Diagnosis: Hypoglycemia, unspecified[ICD10: E16.2]

Diagnosis: Other fatigue[ICD10: R53.83]

Diagnosis: Urinary tract infection, site not specified[ICD10: N39.0] Akanksha DRIVER DO [facility] CPT-4: 76585        06/10/2019

 

                                        ([id number]) OFFICE/OUTPATIENT VISIT EST

Diagnosis: Essential (primary) hypertension[ICD10: I10]

Diagnosis: Gastro-esophageal reflux disease without esophagitis[ICD10: K21.9]

Diagnosis: Urinary tract infection, site not specified[ICD10: N39.0] Akanksha DRIVER DO [facility] CPT-4: 85246        2019

 

                                        (35323) OFFICE/OUTPATIENT VISIT EST

Diagnosis: Gastro-esophageal reflux disease without esophagitis[ICD10: K21.9]

Diagnosis: Epigastric pain[ICD10: R10.13] [facility]            CPT-4: 64917              2018

 

                                        (36567) OFFICE/OUTPATIENT VISIT EST

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

Diagnosis: Essential (primary) hypertension[ICD10: I10]

Diagnosis: Generalized anxiety disorder[ICD10: F41.1]

Diagnosis: Gastro-esophageal reflux disease without esophagitis[ICD10: K21.9] 

Akanksha DRIVER DO [facility] CPT-4: 61068        2018

 

                                        OFFICE/OUTPATIENT VISIT EST

Diagnosis: Gastro-esophageal reflux disease without esophagitis[ICD10: K21.9]

Diagnosis: Palpitations[ICD10: R00.2]

Diagnosis: Urinary tract infection, site not specified[ICD10: N39.0]

Diagnosis: Generalized anxiety disorder[ICD10: F41.1] Akanksha DRIVER [employer] [facility] CPT-4: 01645              10/02/2018

 

                                        (56981) NURSE/OUTPATIENT VISIT EST

Diagnosis: Coronary atherosclerosis due to calcified coronary lesion[ICD10: 
I25.84]

Diagnosis: Hypoglycemia, unspecified[ICD10: E16.2]

Diagnosis: Dizziness and giddiness[ICD10: R42]

Diagnosis: Occlusion and stenosis of bilateral carotid arteries[ICD10: I65.23] 

Akanksha DRIVER DO [facility] CPT-4: 13854        2018

 

                                        OFFICE/OUTPATIENT VISIT EST

Diagnosis: Epigastric pain[ICD10: R10.13]

Diagnosis: Generalized anxiety disorder[ICD10: F41.1]

Diagnosis: FLU VACCINE[ICD10: Z23] Akanksha KEY [employer] 

[facility]                       CPT-4: 59008              2018

 

                                        (70080) OFFICE/OUTPATIENT VISIT EST

Diagnosis: Essential (primary) hypertension[ICD10: I10]

Diagnosis: Hypoglycemia, unspecified[ICD10: E16.2]

Diagnosis: Gastro-esophageal reflux disease without esophagitis[ICD10: K21.9] 

Akanksha DRIVER [facility] CPT-4: 70851        2018

 

                                        (38522) OFFICE/OUTPATIENT VISIT EST

Diagnosis: Dizziness and giddiness[ICD10: R42] Nat DRIVER [facility]            CPT-4: 34332              2018

 

                                        (05688) OFFICE/OUTPATIENT VISIT EST

Diagnosis: Cervicalgia[ICD10: M54.2]

Diagnosis: Other spondylosis with radiculopathy, cervical region[ICD10: M47.22] 

Akanksha DRIVER [facility] CPT-4: 19977        2018

 

                                        (22608) OFFICE/OUTPATIENT VISIT EST

Diagnosis: Hypoglycemia, unspecified[ICD10: E16.2]

Diagnosis: Other spondylosis with radiculopathy, cervical region[ICD10: M47.22]

Diagnosis: Vertigo of central origin, bilateral[ICD10: H81.43]

Diagnosis: Cervicocranial syndrome[ICD10: M53.0] Akanksha DRIVER [facility]         CPT-4: 42163              2018

 

                                        (94711) OFFICE/OUTPATIENT VISIT EST

Diagnosis: Benign paroxysmal vertigo, bilateral[ICD10: H81.13]

Diagnosis: Otalgia, bilateral[ICD10: H92.03] Nat DRIVER [facility]            CPT-4: 63922              2018

 

                                        (99760) OFFICE/OUTPATIENT VISIT EST

Diagnosis: Low back pain[ICD10: M54.5]

Diagnosis: Radiculopathy, lumbosacral region[ICD10: M54.17]

Diagnosis: Left lower quadrant pain[ICD10: R10.32] Akanksha Otoolendangela DRIVER [facility]         CPT-4: 85719              2018

 

                                        (64474) OFFICE/OUTPATIENT VISIT EST

Diagnosis: FLU VACCINE[ICD10: Z23] Akanksha KEY [facility]                       CPT-4: 67238              10/06/2017

 

                                        (01254) OFFICE/OUTPATIENT VISIT EST

Diagnosis: Mixed hyperlipidemia[ICD10: E78.2]

Diagnosis: Essential (primary) hypertension[ICD10: I10]

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

Diagnosis: Impaired fasting glucose[ICD10: R73.01]

Diagnosis: Other fatigue[ICD10: R53.83] Akanksha DRIVER

[facility]                    CPT-4: 37415              2017

 

                                        (70493) OFFICE/OUTPATIENT VISIT EST

Diagnosis: Pain in thoracic spine[ICD10: M54.6]

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 

Akanksha DRIVER [facility] CPT-4: 93449        2017

 

                                        (24325) OFFICE/OUTPATIENT VISIT EST

Diagnosis: Left lower quadrant pain[ICD10: R10.32]

Diagnosis: Low back pain[ICD10: M54.5] Akanksha SYKES 

[facility]                    CPT-4: 14067              2017

 

                                        (38848) OFFICE/OUTPATIENT VISIT EST

Diagnosis: Mixed hyperlipidemia[ICD10: E78.2]

Diagnosis: Essential (primary) hypertension[ICD10: I10]

Diagnosis: Hypoglycemia, unspecified[ICD10: E16.2] Akanksha DRIVER [facility]         CPT-4: 88922              2017

 

                                        (78138) OFFICE/OUTPATIENT VISIT EST

Diagnosis: Impaired fasting glucose[ICD10: R73.01]

Diagnosis: Mastodynia[ICD10: N64.4]

Diagnosis: Mixed hyperlipidemia[ICD10: E78.2]

Diagnosis: Essential (primary) hypertension[ICD10: I10]

Diagnosis: Other fatigue[ICD10: R53.83] Akanksha DRIVER

[facility]                    CPT-4: 87575              2017

 

                                        (21407) OFFICE/OUTPATIENT VISIT EST

Diagnosis: Acute upper respiratory infection, unspecified[ICD10: J06.9] Luba Stevenson              AKANKSHA DRIVER [facility] CPT-4: 15765        2017

 

                                        (98388) OFFICE/OUTPATIENT VISIT EST

Diagnosis: Acute mastoiditis without complications, right ear[ICD10: H70.001] 

Akanksha DRIVER DO [facility] CPT-4: 69245        2016

 

                                        (11085) OFFICE/OUTPATIENT VISIT EST

Diagnosis: FLU VACCINE[ICD10: Z23] Akanksha KEY [facility]                       CPT-4: 08156              10/07/2016

 

                                        (39036) OFFICE/OUTPATIENT VISIT EST

Diagnosis: Mixed hyperlipidemia[ICD10: E78.2]

Diagnosis: Essential (primary) hypertension[ICD10: I10]

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

Diagnosis: Impaired fasting glucose[ICD10: R73.01] Akanksha DRIVER [facility]         CPT-4: 72511              2016

 

                                        (90781) OFFICE/OUTPATIENT VISIT EST

Diagnosis: Constipation, unspecified[ICD10: K59.00] Akanksha DRIVER [facility] CPT-4: 74610              2016

 

                                        (50742) OFFICE/OUTPATIENT VISIT EST

Diagnosis: Essential (primary) hypertension[ICD10: I10]

Diagnosis: Mixed hyperlipidemia[ICD10: E78.2]

Diagnosis: Chronic kidney disease, stage 1[ICD10: N18.1] Akanksha DRIVER [facility] CPT-4: 41512              2016

 

                                        (37872) OFFICE/OUTPATIENT VISIT EST

Diagnosis: Muscle weakness (generalized)[ICD10: M62.81]

Diagnosis: Dizziness and giddiness[ICD10: R42]

Diagnosis: Essential (primary) hypertension[ICD10: I10]

Diagnosis: History of falling[ICD10: Z91.81] Akanksha Otoolendangela DRIVER [facility]            CPT-4: 84766              2015

 

                                        (81603) OFFICE/OUTPATIENT VISIT EST

Diagnosis: FLU VACCINE[ICD10: Z23] Akanksha Xiangndangela KEY [facility]                       CPT-4: 05483              10/16/2015

 

                                        (48020) OFFICE/OUTPATIENT VISIT EST

Diagnosis: Acute renal failure[ICD9: 584.9]

Diagnosis: POLYURIA[ICD9: 788.42] Akanksha LANCE [facility]                       CPT-4: 31626              09/15/2015

 

                                        (73744) OFFICE/OUTPATIENT VISIT EST

Diagnosis: HYPERLIPIDEMIA NEC/NOS[ICD9: 272.4]

Diagnosis: HYPERTENSION[ICD9: 401.9]

Diagnosis: CAD[ICD9: 414.00]

Diagnosis: Hyperglycemia[ICD9: 790.29]

Diagnosis: MALAISE AND FATIGUE[ICD9: 780.79] Akanksha KEVINQUELIN

SHERRY DRIVER [facility]            CPT-4: 87102              09/10/2015

 

                                        (92017) OFFICE/OUTPATIENT VISIT EST

Diagnosis: MALAISE AND FATIGUE[ICD9: 780.79]

Diagnosis: HYPOGLYCEMIA[ICD9: 251.2]

Diagnosis: Grieving[ICD9: 309.0]

Diagnosis: ABDOMINAL PAIN[ICD9: 789.00] Akanksha HIGHTOWERLINE OLIVIA DRIVER

[facility]                    CPT-4: 76750              2015

 

                                        OFFICE/OUTPATIENT VISIT EST

Diagnosis: CERUMEN IMPACTION[ICD9: 380.4]

Diagnosis: EUSTACHIAN TUBE DYSFUNCTION[ICD9: 381.81] Bertha Mon      

AKANKSHA DRIVER [facility] CPT-4: 70522              2015

 

                                        (72166) OFFICE/OUTPATIENT VISIT EST

Diagnosis: Leg pain[ICD9: 729.5]

Diagnosis: SUPERFIC PHLEBITIS-LEG[ICD9: 451.0] Akanksha Xiangrodo ROBERTSON SAramis 

KRISTEL [facility]            CPT-4: 72181              2015

 

                                        (54183) OFFICE/OUTPATIENT VISIT EST

Diagnosis: Thoracic back pain[ICD9: 724.1]

Diagnosis: SPASM OF MUSCLE[ICD9: 728.85] Akanksha HIGHTOWERLINE OLIVIA DRIVER [facility]            CPT-4: 35753              2015

 

                                        (17679) OFFICE/OUTPATIENT VISIT EST

Diagnosis: DIZZINESS/VERTIGO[ICD9: 780.4]

Diagnosis: Benign positional vertigo[ICD9: 386.11]

Diagnosis: HYPERTENSION[ICD9: 401.9]

Diagnosis: Suspicious nevus[ICD9: 238.2] Akanksha DRIVER [facility]            CPT-4: 77822              2015

 

                                        (28607) OFFICE/OUTPATIENT VISIT EST

Diagnosis: FLU VACCINE[ICD10: Z23] Akanksha SPENCER

[facility]                       CPT-4: 11700              10/17/2014

 

                                        OFFICE/OUTPATIENT VISIT EST

Diagnosis: SINUSITIS, ACUTE[ICD9: 461.9]

Diagnosis: EUSTACHIAN TUBE DYSFUNCTION[ICD9: 381.81] Bertha DRIVER [facility] CPT-4: 50220              2014

 

                                        (06504) OFFICE/OUTPATIENT VISIT EST

Diagnosis: DIZZINESS/VERTIGO[ICD9: 780.4]

Diagnosis: HYPERTENSION[ICD9: 401.9] [facility]                       [facility]: 88323              2014

 

                                        (38018) OFFICE/OUTPATIENT VISIT EST

Diagnosis: HYPERTENSION[ICD9: 401.9]

Diagnosis: MALAISE AND FATIGUE[ICD9: 780.79]

Diagnosis: SINUSITIS, ACUTE[ICD9: 461.9] [facility]            CPT[facility]: 62489              2014

 

                                        (96051) OFFICE/OUTPATIENT VISIT EST

Diagnosis: HYPERLIPIDEMIA NEC/NOS[ICD9: 272.4]

Diagnosis: HYPERTENSION[ICD9: 401.9]

Diagnosis: B12 DEFIC ANEMIA NEC[ICD9: 281.1]

Diagnosis: HYPOGLYCEMIA[ICD9: 251.2] Akanksha MEJIA [facility]                       CPT-4: 28857              2014

 

                                        OFFICE/OUTPATIENT VISIT EST

Diagnosis: COUGH[ICD9: 786.2] Bertha DRIVER [facility] 

CPT-4: 16665                            2014

 

                                        OFFICE/OUTPATIENT VISIT EST

Diagnosis: COUGH[ICD9: 786.2]

Diagnosis: URI, ACUTE[ICD9: 465.9] Bertha SPENCER

[facility]                       CPT-4: 30973              10/15/2013

 

                                        (44842) OFFICE/OUTPATIENT VISIT EST

Diagnosis: HYPERTENSION[ICD9: 401.9]

Diagnosis: CEPHALGIA[ICD9: 784.0]

Diagnosis: ANXIETY STATE NOS[ICD9: 300.00]

Diagnosis: FLU VACCINE[ICD9: V04.81] Akanksha MEJIA [facility]                       CPT-4: 60749              2013

 

                                        (34903) OFFICE/OUTPATIENT VISIT EST

Diagnosis: DIZZINESS/VERTIGO[ICD9: 780.4]

Diagnosis: SINUSITIS, ACUTE[ICD9: 461.9]

Diagnosis: Benign positional vertigo[ICD9: 386.11] Akanksha DRIVER [facility]         CPT-4: 09818              2013

 

                                        OFFICE/OUTPATIENT VISIT EST

Diagnosis: OTITIS MEDIA NOS[ICD9: 382.9] Akanksha DRIVER [facility]            CPT-4: 84747              2013

 

                                        OFFICE/OUTPATIENT VISIT EST

Diagnosis: Perforation of ear drum[ICD9: 384.20]

Diagnosis: SINUSITIS, ACUTE[ICD9: 461.9] Akanksha DRIVER [facility]            CPT-4: 81023              05/10/2013

 

                                        (40548) OFFICE/OUTPATIENT VISIT EST

Diagnosis: Mastalgia[ICD9: 611.71] Akanksha SPENCER

[facility]                       CPT-4: 18448              2013

 

                                        (04269) OFFICE/OUTPATIENT VISIT EST

Diagnosis: HYPERLIPIDEMIA NEC/NOS[ICD9: 272.4]

Diagnosis: HYPERTENSION[ICD9: 401.9]

Diagnosis: DIZZINESS/VERTIGO[ICD9: 780.4]

Diagnosis: Hyperglycemia[ICD9: 790.29]

Diagnosis: MALAISE AND FATIGUE[ICD9: 780.79] Akanksha CHEATHAMAramis 

[facility]RISTEL [facility]            CPT-4: 92243              2012

 

                                        (01347) OFFICE/OUTPATIENT VISIT EST

Diagnosis: EUSTACHIAN TUBE DYSFUNCTION[ICD9: 381.81]

Diagnosis: DIZZINESS/VERTIGO[ICD9: 780.4]

Diagnosis: ABDOMINAL PAIN[ICD9: 789.00]

Diagnosis: GERD[ICD9: 530.81]

Diagnosis: CERUMEN IMPACTION[ICD9: 380.4] [facility]ha [facility]            CPT-4: 09099              2012

 

                                        OFFICE/OUTPATIENT VISIT EST

Diagnosis: ABDOMINAL PAIN[ICD9: 789.00]

Diagnosis: DYSPEPSIA[ICD9: 536.8] Akanksha LANCE [facility]                       CPT-4: 70325              10/23/2012

 

                                        (79919) OFFICE/OUTPATIENT VISIT EST

Diagnosis: ABDOMINAL PAIN[ICD9: 789.00]

Diagnosis: DYSPEPSIA[ICD9: 536.8]

Diagnosis: IBS[ICD9: 564.1] Akanksha DRIVER [facility] CPT

-

4: 89835                                10/10/2012

 

                                        OFFICE/OUTPATIENT VISIT EST

Diagnosis: DIZZINESS/VERTIGO[ICD9: 780.4]

Diagnosis: HYPOGLYCEMIA[ICD9: 251.2] Akanksha MEJIA [facility]                       CPT-4: 20027              2012

 

                                        (72665) OFFICE/OUTPATIENT VISIT EST

Diagnosis: URINARY TRACT INFECTION[ICD9: 599.0] Akanksha DRIVER [facility]            CPT-4: 28516              2012

 

                                        (13437) OFFICE/OUTPATIENT VISIT EST

Diagnosis: HYPERLIPIDEMIA NEC/NOS[ICD9: 272.4]

Diagnosis: HYPERTENSION[ICD9: 401.9]

Diagnosis: MALAISE AND FATIGUE[ICD9: 780.79]

Diagnosis: DIZZINESS/VERTIGO[ICD9: 780.4] [facility]ha [facility]            CPT-4: 79165              2012

 

                                        (86234) OFFICE/OUTPATIENT VISIT EST

Diagnosis: DIZZINESS/VERTIGO[ICD9: 780.4]

Diagnosis: MALAISE AND FATIGUE[ICD9: 780.79]

Diagnosis: HYPERTENSION[ICD9: 401.9] Akanksha MEJIA [facility]                       CPT-4: 80720              2012

 

                                        OFFICE/OUTPATIENT VISIT EST

Diagnosis: LUMB/LUMBOSAC DISC DEGEN[ICD9: 722.52]

Diagnosis: Radiculopathy of leg[ICD9: 724.4]

Diagnosis: SACROILIITIS NEC[ICD9: 720.2]

Diagnosis: SPINAL ENTHESOPATHY[ICD9: 720.1] Akanksha DRIVER [facility]            CPT-4: 74364              2012

 

                                        (29780) OFFICE/OUTPATIENT VISIT EST

Diagnosis: PAIN, LOWER BACK[ICD9: 724.2]

Diagnosis: SPASM OF MUSCLE[ICD9: 728.85]

Diagnosis: SCIATICA[ICD9: 724.3]

Diagnosis: Lumbar degenerative disc disease[ICD9: 722.52] Akanksha DRIVER DO [facility] CPT-4: 95121              2012

 

                                        (16335) OFFICE/OUTPATIENT VISIT EST

Diagnosis: PAIN IN THORACIC SPINE[ICD9: 724.1]

Diagnosis: SPASM OF MUSCLE[ICD9: 728.85] Akanksha DRIVER [facility]            CPT-4: 51548              2012

 

                                        (57393) OFFICE/OUTPATIENT VISIT EST

Diagnosis: PAIN, LOWER BACK[ICD9: 724.2]

Diagnosis: SPASM OF MUSCLE[ICD9: 728.85]

Diagnosis: Sacroiliac dysfunction[ICD9: 739.4]

Diagnosis: Lumbar degenerative disc disease[ICD9: 722.52] Akanksha DRIVER [facility] CPT-4: 11885              2012

 

                                        OFFICE/OUTPATIENT VISIT EST

Diagnosis: MALAISE AND FATIGUE[ICD9: 780.79]

Diagnosis: HYPOTENSION[ICD9: 458.9]

Diagnosis: CAD[ICD9: 414.00] Akanksha DRIVER [facility] 

CPT-4: 53553                            2012

 

                                        OFFICE/OUTPATIENT VISIT EST

Diagnosis: SINUSITIS, ACUTE[ICD9: 461.9]

Diagnosis: PHARYNGITIS, ACUTE[ICD9: 462]

Diagnosis: CERUMEN IMPACTION[ICD9: 380.4] [facility]ha [facility]            CPT-4: 02244              2011

 

                                        OFFICE/OUTPATIENT VISIT EST

Diagnosis: PAIN IN THORACIC SPINE[ICD9: 724.1]

Diagnosis: GERD[ICD9: 530.81]

Diagnosis: DIZZINESS/VERTIGO[ICD9: 780.4] Akanksha BAUMAN S

. 

[facility]            CPT-4: 59547              2011

 

                OFFICE/OUTPATIENT VISIT EST Akanksha OTOOLE

[facility] CPT-

4: 44797                                2011

 

                    (93285) OFFICE/OUTPATIENT VISIT EST Akanksha CASTILLO SAramis [facility] 

[facility]                       CPT-4: 83034              2011

 

                                        (59020) OFFICE/OUTPATIENT VISIT, EST

Diagnosis: PAIN, LOWER BACK[ICD9: 724.2] Akanksha BAUMAN SAramis

 

[facility]            CPT-4: 54212              2011

 

                    (47360) OFFICE/OUTPATIENT VISIT, EST Akanksha DE LA ROSA S. [facility]

[facility]                       CPT-4: 86361              2011

 

                    (58642) OFFICE/OUTPATIENT VISIT, EST Akanksha DE LA ROSA S. [facility]

[facility]                       CPT-4: 73708              2010

 

                    (81175) OFFICE/OUTPATIENT VISIT, EST Akanksha DE LA ROSA S. [facility]

[facility]                       CPT-4: 37900              2010

 

                    (54057) OFFICE/OUTPATIENT VISIT, EST Akanksha DE LA ROSA S. [facility]

[facility]                       CPT-4: 91193              2010

 

                    (80392) OFFICE/OUTPATIENT VISIT, EST Akanksha DE LA ROSA S. [facility]

[facility]                       CPT-4: 38936              2010

 

                    (83495) OFFICE/OUTPATIENT VISIT, EST Akanksha DE LA ROSA S. [facility]

[facility]                       CPT-4: 95465              2010

 

                    (40263) OFFICE/OUTPATIENT VISIT, EST Akanksha DE LA ROSA S. [facility]

[facility]                       CPT-4: 40375              2010







Plan of Care





             Planned Activity Notes        Codes        Status       Date

 

                          Visit Diagnosis Plan: Essential (primary) hypertension

 Discussion: Improving 

with higher dose of metoprolol Recheck 2weeks

                                        ICD-9 : 401.9

ICD-10 : I10

                                                    2020

 

                          Visit Diagnosis Plan: Palpitations Discussion: Continu

e higher dose of 

metoprolol

                                        ICD-9 : 785.1

ICD-10 : R00.2

                                                    2020

 

                                        Appointment: Akanksha Drivertel:[phone]

                                        [address] [place] [address]                                              FOLLOW UP       2020

 

                                        Appointment: Akanksha Drivertel:[phone]

                                        [address] [place] [address]              2020--moved up to Tues 20 at 4pm (km)                 [place]

NCELED        2020

 

                          Visit Diagnosis Plan: Palpitations Discussion: Check C

BC, CMP, TSH

                                        ICD-9 : 785.1

ICD-10 : R00.2

                                                    2020

 

                          Visit Diagnosis Plan: Essential hypertension Discussio

n: Increase metoprolol to 

25mg q AM and 50mg po q pM Recheck 1 week

                                        ICD-9 : 401.9

ICD-10 : I10

                                                    2020

 

                                        Appointment: Akanksha Driver

WPtel:[phone]

                                        [address] [place] [address]

[place]                                              BP CHECK        2020

 

                                        Appointment: Akanksha Drivertel:[phone]

                                        [address] [place] [address]                                              ACUTE ILLNESS   2020

 

                          Patient Education: metoprolol tartrate- [employer] [facility] [id number] 

[email or web address]://[email or web address]/[email or web address]/resources/[email or web address]

                                        Completed           2020

 

                    Care Plan: X-RAY EXAM NECK SPINE 4/5VWS                     

LOINC : 87779-5

                          Pending                   2020

 

                    Care Plan: X-RAY EXAM THORAC SPINE4/>VW                     

LOINC : 75664-0

                          Pending                   2020

 

                                        Appointment: Akanksha Driver

WPtel:[phone]

                                        [address] [place] [address]

[place]                                              LAB             2019

 

                                        Appointment: Akanksha Driver

WPtel:[phone] [address] [facility]

[place]                                              INJECTION       10/22/2019

 

             Patient Education: INFLUENZA VACCINE [facility]                           

Completed    10/22/2019

 

                                        Appointment: Akanksha Driver

WPtel:[phone]

                                        [address] [place], [place] [address]                                              LAB             10/11/2019

 

                          Visit Diagnosis Plan: Acute serous otitis media, left 

ear Discussion: instructed

to use flonase and claritin daily for symptom relief. discussed with patient 
that if no improvement in 2 weeks, will need to follow up with ENT for audiology
exam. instructed to call office with any other symptoms such as otalgia, 
dysphagia, fever, etc.

                                        ICD-9 : 381.01

ICD-10 : H65.02

                                                    2019

 

                                        Appointment: Nat Lozoya

                                        [address] [place] [address]                                              ACUTE ILLNESS   2019

 

                          Visit Diagnosis Plan: Urinary tract infection, site no

t specified Discussion: 

Started an old abx prescription

                                        ICD-9 : 599.0

ICD-10 : N39.0

                                                    06/10/2019

 

                          Visit Diagnosis Plan: Hypoglycemia, unspecified Discus

madeleine: Monitor BS At least 

6 small meals a day each with protein

                                        ICD-9 : 251.2

ICD-10 : E16.2

                                                    06/10/2019

 

                          Visit Diagnosis Plan: Essential (primary) hypertension

 Discussion: Stable Check 

CMP

                                        ICD-9 : 401.9

ICD-10 : I10

                                                    06/10/2019

 

                          Visit Diagnosis Plan: Other fatigue Discussion: Check 

CBC, TSH

                                        ICD-9 : 780.79

ICD-10 : R53.83

                                                    06/10/2019

 

                                        Appointment: Akanksha Driver

WPtel:[phone]

                                        [address] [place], [place] [id number]

                                              FOLLOW UP       06/10/2019

 

                          Visit Diagnosis Plan: Essential (primary) hypertension

 Discussion: Stable Sees 

Dr. Clements this month

                                        ICD-9 : 401.9

ICD-10 : I10

                                                    2019

 

                          Visit Diagnosis Plan: Urinary tract infection, site no

t specified Discussion: 

Macrobid 100mg po BID for 1 week

                                        ICD-9 : 599.0

ICD-10 : N39.0

                                                    2019

 

                          Visit Diagnosis Plan: Gastro-esophageal reflux disease

 without esophagitis 

Discussion: Stable on omeprazole

Follow Up: 3 months

                                        ICD-9 : 530.81

ICD-10 : K21.9

                                                    2019

 

                                        Appointment: Akanksha Driver

WPtel:[phone]

                                        [address] [place], [place] [id number]

[place]                                              FOLLOW UP       2019

 

                          Patient Education: metoprolol tartrate- Optimize[employer] Cou

[facility] [id number] 

[email or web address]/resources/[email or web address]

                                        Completed           2019

 

                                        Appointment: Akanksha Driver

WPtel:[phone]

                                        [address] [place] [id number]

[place]                                              CANCELED        02/15/2019

 

                          Visit Diagnosis Plan: Gastro-esophageal reflux disease

 without esophagitis 

Discussion: Continue protonix and carafate Proceed with updated EGD

                                        ICD-9 : 530.81

ICD-10 : K21.9

                                                    2018

 

                          Visit Diagnosis Plan: Epigastric pain Discussion: [facility] CT abdomen/pelvis due to

ongoing abdominal pain

                                        ICD-9 : 789.06

ICD-10 : R10.13

                                                    2018

 

                                        Appointment: Akanksha Driver

WPtel:[phone]

                                        [facility]

[place]                                              FOLLOW UP       2018

 

                    Care Plan: CT PELVIS W/O DYE                     LOINC : 361

08-9

                          Pending                   2018

 

                    Care Plan: CT ABDOMEN W/O DYE                     LOINC : 36

103-0

                          Pending                   2018

 

                    Care Plan: Referral Order                     SNOMED-CT : 30

[id number]

                          Pending                   2018

 

                                        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

art disease of native coronary artery 

without angina pectoris                 Discussion: S/P cardiac cath--doing medi

vicki management 

with imdur and metoprolol increased Has fwup with cardiology next week Discussed
cardiac rehab

                                        ICD-9 : 414.00

ICD-10 : I25.10

                                                    2018

 

                          Visit Diagnosis Plan: Generalized anxiety disorder Dis

cussion: Stable

                                        ICD-9 : 300.00

ICD-10 : F41.1

                                                    2018

 

                          Visit Diagnosis Plan: Gastro-esophageal reflux disease

 without esophagitis 

Discussion: Continue protonix at BID dosing and carafate BID

Follow Up: 2 months

                                        ICD-9 : 530.81

ICD-10 : K21.9

                                                    2018

 

                          Visit Diagnosis Plan: Essential (primary) hypertension

 Discussion: Stable

                                        ICD-9 : 401.9

ICD-10 : I10

                                                    2018

 

                                        Appointment: Akanksha Driver

WPtel:[phone]

                                        [address] [place] [address]                                              FOLLOW UP       2018

 

                          Visit Diagnosis Plan: Gastro-esophageal reflux disease

 without esophagitis 

Discussion: Continue protonix at BID dosing Continue carafate at q AC and HS 
dosing for 2 more weeks then decrease to BID dosing

Follow Up: 4 weeks

                                        ICD-9 : 530.81

ICD-10 : K21.9

                                                    10/02/2018

 

                          Visit Diagnosis Plan: Urinary tract infection, site no

t specified Discussion: 

Reculture urine

                                        ICD-9 : 599.0

ICD-10 : N39.0

                                                    10/02/2018

 

                          Visit Diagnosis Plan: Generalized anxiety disorder Dis

cussion: Increase xanax to

BID dosing especially with upcoming cardiac testing which is already making 
patient more anxious and worried

                                        ICD-9 : 300.00

ICD-10 : F41.1

                                                    10/02/2018

 

                          Visit Diagnosis Plan: Palpitations Discussion: Cardilo

logy going to schedule 

Lexiscan

                                        ICD-9 : 785.1

ICD-10 : R00.2

                                                    10/02/2018

 

                                        Appointment: Akanksha Driver

WPtel:[phone]

                                        [address] [place], [place] [address]

                                              FOLLOW UP       10/02/2018

 

             Patient Education: Patient Medication Summary                      

     Completed    10/02/2018

 

                                        Appointment: Akanksha Driver

WPtel:[phone]

                                        [address] [place] [address]

[place]                                              LAB             2018

 

             Patient Education: Patient Medication Summary                      

     Completed    2018

 

                          Visit Diagnosis Plan: Epigastric pain Discussion: Cont

inue protonix and carafate

but make into a slurry Flu shot given Discussed EGD if symptoms persist

Follow Up: 2 weeks

                                        ICD-9 : 789.06

ICD-10 : R10.13

                                                    2018

 

                          Visit Diagnosis Plan: Generalized anxiety disorder Dis

cussion: Did discuss 

increased risk of benzodiazepines causing dementia but at this time will stay at
xanax 0.25mg po BID

                                        ICD-9 : 300.00

ICD-10 : F41.1

                                                    2018

 

                                        Appointment: Akanksha Driver

WPtel:[phone]

                                        [address] [place] [address]                                              FOLLOW UP       2018

 

             Patient Education: Patient Medication Summary                      

     Completed    2018

 

                          Visit Diagnosis Plan: Essential (primary) hypertension

 Discussion: Has 

hydralazine to use prn per cardiology Going to do 30 day holter monitor

                                        ICD-9 : 401.9

ICD-10 : I10

                                                    2018

 

                          Visit Diagnosis Plan: Hypoglycemia, unspecified Discus

madeleine: 6 small meals a 

day--each with protein Increase fluid intake

                                        ICD-9 : 251.2

ICD-10 : E16.2

                                                    2018

 

                          Visit Diagnosis Plan: Gastro-esophageal reflux disease

 without esophagitis 

Discussion: Change to protonix 40mg po BID

Follow Up: 1 months

                                        ICD-9 : 530.81

ICD-10 : K21.9

                                                    2018

 

                                        Appointment: Akanksha Driver

WPtel:[phone]

                                        [address] [place] [address]                                              ACUTE ILLNESS   2018

 

             Patient Education: Patient Medication Summary                      

     Completed    2018

 

                          Visit Diagnosis Plan: Dizziness and giddiness Discussi

on: blood work to be 

completed in office including cmp, cbc, troponin to rule out glucose 
intolerance, infection, dehydration. instructed patient that she needs to see 
her cardiologist sooner than oct and patient requested we contact dr. clements's 
office. will have front office make appt. patient has carotid doppler annually.

                                        ICD-9 : 780.4

ICD-10 : R42

                                                    2018

 

                                        Appointment: Nat Lozoya

                                        [address] [place] [address]                                              ACUTE ILLNESS   2018

 

             Patient Education: Patient Medication Summary                      

     Completed    2018

 

                          Visit Diagnosis Plan: Cervicalgia Discussion: Complete

 course of PT since 

symptoms improved Continue stretching exercises Notify if symptoms return or 
worsen

                                        ICD-9 : 723.1

ICD-10 : M54.2

                                                    2018

 

                                        Appointment: Akanksha Driver

WPtel:[phone]

                                        [address] [place] [address]                                              FOLLOW UP       2018

 

             Patient Education: Patient Medication Summary                      

     Completed    2018

 

                          Visit Diagnosis Plan: Hypoglycemia, unspecified Discus

madeleine: Eat something with 

protein every 2 hrs

                                        ICD-9 : 251.2

ICD-10 : E16.2

                                                    2018

 

                          Visit Diagnosis Plan: Other spondylosis with radiculop

athy, cervical region 

Discussion: Check MRI of cervical spine

                                        ICD-9 : 721.0

ICD-10 : M47.22

                                                    2018

 

                          Visit Diagnosis Plan: Vertigo of central origin, bilat

eral Discussion: Discussed

PT for vestibular therapy

                                        ICD-9 : 386.2

ICD-10 : H81.43

                                                    2018

 

                                        Appointment: Akanksha Drievr

WPtel:[phone] [address] [facility]

                                                              2018

 

             Patient Education: Patient Medication Summary                      

     Completed    2018

 

                    Care Plan: MRI NECK SPINE W/O DYE                     LOINC 

: 09260-0

                          Pending                   2018

 

                          Visit Diagnosis Plan: Otalgia, bilateral Discussion: k

enalog 40 mg given to 

patient im. instructed patient to monitor blood sugar at home due to risk of 
increased sugar. instructed patient to rtc on wednesday if no improvement and 
may require antibiotic.

                                        ICD-9 : 388.70

ICD-10 : H92.03

                                                    2018

 

                          Visit Diagnosis Plan: Benign paroxysmal vertigo, bilat

eral Discussion: patient 

has meclizine at home but states it makes her too tired. instructed patient to 
cut in half and take at night. if it doesn't cause too much drowsiness, 
instructed to take bid until feeling better. instructed to rtc wed if no 
improvement or worsening symptoms. instructed patient to increase fluid intake 
as well.

                                        ICD-9 : 386.11

ICD-10 : H81.13

                                                    2018

 

                                        Appointment: Nat Lozoya

                                        [address] [place], [place] [address]

                                              ACUTE ILLNESS   2018

 

             Patient Education: Patient Medication Summary                      

     Completed    2018

 

                          Visit Diagnosis Plan: Left lower quadrant pain Discuss

ion: Culture urine Notify 

if worsens

                                        ICD-9 : 789.04

ICD-10 : R10.32

                                                    2018

 

                          Visit Diagnosis Plan: Low back pain Discussion: Stretc

hes Topical aspercreme 

Tylenol 1 po TID

                                        ICD-9 : 724.2

ICD-10 : M54.5

                                                    2018

 

                          Visit Diagnosis Plan: Radiculopathy, lumbosacral regio

n Discussion: As above

                                        ICD-9 : 724.4

ICD-10 : M54.17

                                                    2018

 

                                        Appointment: Akanksha Driver

WPtel:[phone]

                                        [address] [place], [place] [address]                                              ACUTE ILLNESS   2018

 

             Patient Education: Patient Medication Summary                      

     Completed    2018

 

                                        Appointment: Akanksha Driver

WPtel:[phone]

                                        [address] [place], [place] [address]

[place]                                              INJECTION       10/06/2017

 

             Patient Education: Patient Medication Summary                      

     Completed    10/06/2017

 

                                        Appointment: Akanksha Driver

WPtel:[phone]

                                        [address] [place], [place] [address]

[place]                                              LAB             2017

 

             Patient Education: Patient Medication Summary                      

     Completed    2017

 

                          Visit Diagnosis Plan: Pain in thoracic spine Discussio

n: PT has helped Continue 

stretches and walking Discussed joining [facility] to continue exercise

                                        ICD-9 : 724.1

ICD-10 : M54.6

                                                    2017

 

                          Visit Diagnosis Plan: Other intervertebral disc degene

ration, lumbar region 

Follow Up: 3 months

                                        ICD-9 : 722.52

ICD-10 : M51.36

                                                    2017

 

                                        Appointment: Akanksha Driver

WPtel:[phone]

                                        [address] [place], [place] [address]

                                              FOLLOW UP       2017

 

             Patient Education: Patient Medication Summary                      

     Completed    2017

 

                          Visit Diagnosis Plan: Low back pain Discussion: Start 

PT for low back- Seems 

like abdominal pain is radiating from low back

Follow Up: 5 weeks

                                        ICD-9 : 724.2

ICD-10 : M54.5

                                                    2017

 

                          Visit Diagnosis Plan: Left lower quadrant pain Discuss

ion: Had CT scan of 

abdomen/pelvis in 2017 and normal colonoscopy in 2015

                                        ICD-9 : 789.04

ICD-10 : R10.32

                                                    2017

 

                                        Appointment: Akanksha Driver

WPtel:[phone]

                                        [address] [place] [address]                                              ACUTE ILLNESS   2017

 

             Patient Education: Patient Medication Summary                      

     Completed    2017

 

                                        Appointment: Akanksha Drivertel:[phone]

                                        [address] [place] [address]

[place]                                              LAB             2017

 

             Patient Education: Patient Medication Summary                      

     Completed    2017

 

                          Visit Diagnosis Plan: Mixed hyperlipidemia Discussion:

 Check lipids

                                        ICD-9 : 272.4

ICD-10 : E78.2

                                                    2017

 

                          Visit Diagnosis Plan: Impaired fasting glucose Discuss

ion: Return in AM for CMP,

HbA1C Accuchecks daily Diet/Exercise discussed at length

                                        ICD-9 : 790.29

ICD-10 : R73.01

                                                    2017

 

                          Visit Diagnosis Plan: Other fatigue Discussion: Check 

CBC, TSH

                                        ICD-9 : 780.79

ICD-10 : R53.83

                                                    2017

 

                          Visit Diagnosis Plan: Mastodynia Discussion: Check Jace

ateral diagnostic 

mammogram with US

                                        ICD-9 : 611.71

ICD-10 : N64.4

                                                    2017

 

                                        Appointment: Akanksha Driver

WPtel:[phone]

                                        [address] [place] [address]              3/7 confirmed `sl                 ACUTE ILLNESS   2017

 

             Patient Education: Patient Medication Summary                      

     Completed    2017

 

                    Care Plan: MAMMOGRAM SCREENING                     LOINC : 2

6347-5

                          Pending                   2017

 

                          Visit Diagnosis Plan: Acute upper respiratory infectio

n, unspecified Discussion:

Exam is reassuring Likely viral illness Supportive care reviewed and encouraged 
Follow up PRN

                                        ICD-9 : 465.9

ICD-10 : J06.9

                                                    2017

 

                                        Appointment: Luba Stevenson 

                                        [address] [place] [address]                                              ACUTE ILLNESS   2017

 

             Patient Education: Patient Medication Summary                      

     Completed    2017

 

                          Visit Plan:               Supportive care. Rest, Fluid

s, Tylenol/Motrin prn fever or 

bodyaches. Notify if worsening symptoms.

                                                            2016

 

                                        Appointment: Akanksha Drivertel:[phone]

                                        [address] [place] [address]                                              ACUTE ILLNESS   2016

 

             Patient Education: Patient Medication Summary                      

     Completed    2016

 

                                        Appointment: Akanksha Driver:[phone]

                                        [address] [place], [place] [id number]

[place]                                              INJECTION       10/07/2016

 

             Patient Education: Patient Medication Summary                      

     Completed    10/07/2016

 

                                        Appointment: Akanksha Driver

WPtel:[phone]

                                        [address] [place], [place] [address]

[place]                                              LAB             2016

 

             Patient Education: Patient Medication Summary                      

     Completed    2016

 

                          Visit Plan:               Add miralax daily Use daily 

probiotic and metamucil and push fluids

Notify if worsens/persists

                                                            2016

 

                                        Appointment: Akanksha Driver

WPtel:[phone]

                                        [address] [place] [address]

[place]              16 confirmed ~sl                 ACUTE ILLNESS   2016

 

             Patient Education: Patient Medication Summary                      

     Completed    2016

 

                          Visit Plan:               No NSAIDs Kidney function di

scussed Discussed hydration Monitor lab

every 4months so will check CMP, HbA1C next month

                                                            2016

 

                                        Appointment: Akanksha Driver

WPtel:[phone]

                                        [address] [place], [place] [address]

[place]              03/15 confirmed ~sl                 ACUTE ILLNESS   2016

 

             Patient Education: Patient Medication Summary                      

     Completed    2016

 

                          Visit Plan:               Vestibular exercises Refill 

flonase Strict low Na diet--discussed 

that needs to read labels Rx for walker with chair given due to muscle 
weakness/unsteadiness Has carotids and abdominal aorta and legs checked in 
January Check Chem 7

                                                            2015

 

                                        Appointment: Akanksha Driver

WPtel:[phone]

                                        [address] [place], [place] [id number]

[place]              12/15/15 appt confirmed cn                 FOLLOW UP       

 

             Patient Education: Patient Medication Summary                      

     Completed    2015

 

             Patient Education: Vertigo                           Completed    1

2015

 

                                        Appointment: Akanksha Driver

WPtel:[phone]

                                        [address] [place], [place] [address]

[place]                                              INJECTION       10/16/2015

 

             Patient Education: Patient Medication Summary                      

     Completed    10/16/2015

 

                                        Appointment: Akanksha Driver

WPtel:[phone]

                                        [address] [place], [place] [id number] [place] [address]              09/15/2015

 

             Patient Education: Patient Medication Summary                      

     Completed    09/15/2015

 

                                        Referral: Landon De La Torre

WPtel:[phone]

#1 [facility] Uma Vásquez

[id number]

US              Referral                        Completed       2015

 

                                        Appointment: Akanksha Driver

WPtel:[phone]

                                        [address] [place] [address]                                              LAB             09/10/2015

 

             Patient Education: Patient Medication Summary                      

     Completed    09/10/2015

 

                          Visit Plan:               Check CMP, CBC, TSH, Free T4

, B12, Lipids, HbA1C Discussed 6 small 

meals a day each with protein Would likely benefit from antidepressant Update 
colonoscopy

                                                            2015

 

                                        Appointment: Akanksha Driver

WPtel:[phone]

                                        [address] [place] [address]              9/8/15 confirmed                 ACUTE ILLNESS   2015

 

             Patient Education: Patient Medication Summary                      

     Completed    2015

 

                          Visit Plan:               Cerumen flush with warm wate

r and peroxide - good results Resume 

Nasonex nasal spray daily Recommended Debrox earwax removal drops

                                                            2015

 

                                        Appointment: Bertha Mon

WPtel:[phone]

                                        [address] [place] [address]                                              ACUTE ILLNESS   2015

 

             Patient Education: Patient Medication Summary                      

     Completed    2015

 

                          Visit Plan:               Continue aspirin daily Add m

eloxicam for 1week Elevate and Ice Call

on 2015

 

                                        Appointment: Akanksha Driver

WPtel:[phone]

                                        [address] [place] [address]                                              ACUTE ILLNESS   2015

 

             Patient Education: Patient Medication Summary                      

     Completed    2015

 

                          Visit Plan:               Been using baclofen at [facility] and has helped some PT for next 

2-4weeks

                                                            2015

 

                                        Appointment: Akanksha Driver

WPtel:[phone]

                                        [address] [place] [address]                                              Hospital Follow Up 2015

 

             Patient Education: Patient Medication Summary                      

     Completed    2015

 

                                        Appointment: Akanksha Driver

WPtel:[phone]

                                        [address] [place], [place] [id number]                                              LAB             2015

 

                                        Appointment: Akanksha Driver

WPtel:[phone]

                                        [address] [place], [place] [id number]              feeling better, weather -                 FOLLOW UP       

 

                                        Referral: Landon De La Torre

WPtel:[phone]

[facility]

[id number]

US              Referral                        Appointment Requested 2015

 

                          Visit Plan:               Continue exercise and curren

t meds See surgery for removal of right

arm lesion

                                                            2015

 

                                        Appointment: Akanksha Driver

WPtel:[phone]

                                        [address] [place], [place] [address]                                              FOLLOW UP       2015

 

             Patient Education: Patient Medication Summary                      

     Completed    2015

 

                                        Appointment: Akanksha Driver

WPtel:[phone]

                                        [address] [place], [place] [id number]

US                                              INJECTION       10/17/2014

 

             Patient Education: Patient Medication Summary                      

     Completed    10/17/2014

 

                                        Appointment: Bertha Mon

WPtel:[phone]

                                        [address] [place] [address]                                              ACUTE ILLNESS   2014

 

             Patient Education: Patient Medication Summary                      

     Completed    2014

 

                          Visit Plan:               Discussed that likely lumbar

 etiology for leg weakness Will change 

amlodopine to low dose dyazide and see if helps legs and inner ear

                                                            2014

 

                                        Appointment: Akanksha Driver

WPtel:[phone]

                                        [address] [place], [place] [id number]

US                                              FOLLOW UP       2014

 

             Patient Education: Patient Medication Summary                      

     Completed    2014

 

                          Visit Plan:               Ceftin and continue claritin

/flonase Decrease amlodopine to 2.5mg q

HS until fwup with Card due to weakness

                                                            2014

 

                                        Appointment: Akanksha Driver

WPtel:[phone]

                                        [address]                                              ACUTE ILLNESS   2014

 

             Patient Education: Patient Medication Summary                      

     Completed    2014

 

                                        Appointment: Akanksha Driver

WPtel:[phone]

                                        [address] [place], [place] [address]                                              LAB             2014

 

             Patient Education: Patient Medication Summary                      

     Completed    2014

 

                                        Appointment: Bertha Mon

WPtel:[phone]

                                        [address] [place] [address]                                              ACUTE ILLNESS   2014

 

             Patient Education: Patient Medication Summary                      

     Completed    2014

 

                          Visit Plan:               Supportive care. Rest, Fluid

s, Tylenol prn fever or bodyaches. 

Notify if worsening symptoms. Ceftin

                                                            10/15/2013

 

                                        Appointment: Bertha Mon

WPtel:[phone]

                                        [address] [place] [address]                                              ACUTE ILLNESS   10/15/2013

 

             Patient Education: Patient Medication Summary                      

     Completed    10/15/2013

 

                                        Appointment: Akanksha Driver

WPtel:[phone]

                                        [address] [place] [address]                                              BP CHECK        2013

 

             Patient Education: Patient Medication Summary                      

     Completed    2013

 

                                        Appointment: Akanksha Drivertel:[phone]

                                        [address] [place] [address]                                              ER Follow UP    2013

 

             Patient Education: Patient Medication Summary                      

     Completed    2013

 

                          Visit Plan:               Restart flonase BID Use mecl

izine 25mg q HS Vestibular exercises 

Claritin 10mg q AM See ENT if doesn't resolve

                                                            2013

 

                                        Appointment: Akanksha Driver

WPtel:[phone]

                                        [address] [place] [address]                                              ACUTE ILLNESS   2013

 

             Patient Education: Patient Medication Summary                      

     Completed    2013

 

                          Visit Plan:               Will continue to observe

                                                            2013

 

                                        Appointment: Akanksha Driver

WPtel:[phone]

                                        [address] [place] [address]                                              FOLLOW UP       2013

 

             Patient Education: Patient Medication Summary                      

     Completed    2013

 

                          Visit Plan:               ERx for Augmentin 875mg q12 

x 10 days and Floxin otic drops 5 gtts 

AD x7days Sample of Nasonex per pt request Discussed treatment (nasal saline, 
salt water gargles, keeping right ear clean and dry, for worsening go to UC on 
weekend, Tylenol/ibuprofen, etc.) RTC 2 weeks for ear recheck.

                                                            05/10/2013

 

                                        Appointment: Jill Jean 

WPtel:[phone]

                                        [address] [place] [address]                                              ACUTE ILLNESS   05/10/2013

 

             Patient Education: Patient Medication Summary                      

     Completed    05/10/2013

 

                          Visit Plan:               Decrease caffeine intake Marina

ck Bilateral Mammogram with US of left 

breast

                                                            2013

 

                                        Appointment: Akanksha Driver

WPtel:[phone]

                                        [address] [place] [address]                                              ACUTE ILLNESS   2013

 

             Patient Education: Patient Medication Summary                      

     Completed    2013

 

                                        Appointment: Kate Hall

WPtel:[phone]

                                        [address] [place] [address]              appt scheduled                  ACUTE ILLNESS   2013

 

                                        Appointment: Akanksha Driver

WPtel:[phone]

                                        [address] [place], [place] [address]                                              LAB             2012

 

             Patient Education: Patient Medication Summary                      

     Completed    2012

 

                          Visit Plan:               Continue off carafate and se

e how does Continue probiotic Add 

Vestibular exercises and use meclizine prn Continue Nasonex Check fasting lab 
including CMP, Lipids, CBC, TSH, Free T4, HbA1C

                                                            2012

 

                                        Appointment: Akanksha Driver

WPtel:[phone]

                                        [address] [place], [place] [id number]

US                                              FOLLOW UP       2012

 

             Patient Education: Patient Medication Summary                      

     Completed    2012

 

                          Visit Plan:               Continue carafate for 4more 

weeks at current dose then decrease to 

BID for 2wks then q HS

                                                            10/23/2012

 

                                        Appointment: Akanksha Driver

WPtel:[phone]

                                        [address] [place], [place] [id number]

US                                              FOLLOW UP       10/23/2012

 

             Patient Education: Patient Medication Summary                      

     Completed    10/23/2012

 

                          Visit Plan:               Continue omeprazole Add Ellyn

fate 1gm po q AC Add daily probiotic

                                                            10/10/2012

 

                                        Appointment: Akanksha Drivertel:[phone]

                                        [address] [place], [place] [address]                                              ACUTE ILLNESS   10/10/2012

 

             Patient Education: Patient Medication Summary                      

     Completed    10/10/2012

 

                                        Appointment: Akanksha Driver

WPtel:[phone]

                                        [address] [place], [place] [address]                                              INJECTION       2012

 

             Patient Education: Patient Medication Summary                      

     Completed    2012

 

                          Visit Plan:               Diabetic Diet Accuchecks BID

 alternating times Hold onglyza and 

Januvia for now and continue diet and exercise and weight loss and drew BS 
Discussed hypoglycemia and snack of peanut butter and crackers with juice or 
milk

                                                            2012

 

                                        Appointment: Akanksha Driver

WPtel:[phone]

                                        [address] [place] [address]                                              WORK IN         2012

 

             Patient Education: Patient Medication Summary                      

     Completed    2012

 

                                        Appointment: Akanksha Driver

WPtel:[phone]

                                        [address] [place], [place] [id number] [place] [address]              2012

 

             Patient Education: Patient Medication Summary                      

     Completed    2012

 

                                        Appointment: Akanksha Driver

WPtel:[phone]

                                        [address] [place], [place] [address]

US                                              LAB             2012

 

             Patient Education: Patient Medication Summary                      

     Completed    2012

 

                                        Appointment: Akanksha Driver

WPtel:[phone]

                                        [address] [place], [place] [address]                                              ACUTE ILLNESS   2012

 

             Patient Education: Patient Medication Summary                      

     Completed    2012

 

                          Visit Plan:               Injection to Right SI joint 

as above Pt will call in 3 days on pain

Has PT starting in 2wks.

                                                            2012

 

                                        Appointment: Akanksha Driver

WPtel:[phone]

                                        [address]                                              ACUTE ILLNESS   2012

 

             Patient Education: Patient Medication Summary                      

     Completed    2012

 

                          Visit Plan:               OMT done Start PT May need u

pdated MRI if need to consider epidural

Prednisone

                                                            2012

 

                                        Appointment: Akanksha Drivertel:[phone]

                                        [address] [place] [address]                                              OMT             2012

 

             Patient Education: Patient Medication Summary                      

     Completed    2012

 

                          Visit Plan:               Flexeril and Vimovo OMT done

 Daily stretches

                                                            2012

 

                                        Appointment: Akanksha Driver

WPtel:[phone]

                                        [address] [place] [address]                                              ACUTE ILLNESS   2012

 

             Patient Education: Patient Medication Summary                      

     Completed    2012

 

                          Visit Plan:               OMT done Daily stretches Vinod

st heat or biofreeze Right SI joint 

injection Call in 1week Vimovo BID

                                                            2012

 

                                        Appointment: Akanksha Drivertel:[phone]

                                        [address] [place] [address]                                              ACUTE ILLNESS   2012

 

             Patient Education: Patient Medication Summary                      

     Completed    2012

 

                                        Appointment: Akanksha Drivertel:[phone]

                                        [address] [place] [address]                                              LAB             2012

 

             Patient Education: Patient Medication Summary                      

     Completed    2012

 

                          Visit Plan:               Decrease amlodopine to 1.25m

g daily Schedule with Cardiology 

Fasting lab in AM

                                                            2012

 

                                        Appointment: Akanksha Driver

WPtel:[phone]

                                        [address] [place] [address]                                              ACUTE ILLNESS   2012

 

             Patient Education: Patient Medication Summary                      

     Completed    2012

 

                          Visit Plan:               Saline nasal flushes prn. Ty

lenol/Motrin prn headache. Notify if 

persists/symptoms worsening. Cerumen removal from ears as above

                                                            2011

 

                                        Appointment: Akanksha Drivertel:[phone]

                                        [address] [place] [address]                                              ACUTE ILLNESS   2011

 

             Patient Education: Patient Medication Summary                      

     Completed    2011

 

                                        Appointment: Akanksha Driver

WPtel:[phone]

                                        [address] [place], [place] [address]

US                                              INJECTION       10/05/2011

 

             Patient Education: Patient Medication Summary                      

     Completed    10/05/2011

 

                          Visit Plan:               Continue vimovo for 1more we

ek Increase Omeprazole to BID for 1mo 

then resume QD if stomach improved Vestibular exercises with meclizine q HS for 
next week

                                                            2011

 

                                        Appointment: Akanksha Driver

WPtel:[phone]

                                        [address] [place], [place] [address]                                              FOLLOW UP       2011

 

             Patient Education: Patient Medication Summary                      

     Completed    2011

 

                                        Appointment: Akanksha Driver

WPtel:[phone]

                                        [address] [place] [address]                                              ACUTE ILLNESS   2011

 

             Patient Education: Patient Medication Summary                      

     Completed    2011

 

                                        Appointment: Akanksha Driver

WPtel:[phone]

                                        [address] [place], [place] [address]                                              LAB             2011

 

             Patient Education: Patient Medication Summary                      

     Completed    2011

 

                          Visit Plan:               Saline nasal flushes prn. Ty

lenol/Motrin prn headache. Notify if 

persists/symptoms worsening. Add Veramyst Increase Omeprazole to 20mg po BID

                                                            2011

 

                                        Appointment: Akanksha Driver

WPtel:[phone]

                                        [address] [place], [place] [address]

                                              ACUTE ILLNESS   2011

 

             Patient Education: Patient Medication Summary                      

     Completed    2011

 

                                        Appointment: Akanksha Driver

WPtel:[phone]

                                        [address] [place], [place] [address]

                                              FOLLOW UP       2011

 

             Patient Education: Patient Medication Summary                      

     Completed    2011

 

                                        Appointment: Akanksha Driver

WPtel:[phone]

                                        [address] [place], [place] [address]

                                              ACUTE ILLNESS   2011

 

             Patient Education: Patient Medication Summary                      

     Completed    2011

 

                          Visit Plan:               Nasocourt sample given. Pt. 

will notify if symptoms are worse on 

Monday.

                                                            2010

 

                                        Appointment: Kate Hall

WPtel:[phone]

                                        [address] [place], [place] [address]                                              ACUTE ILLNESS   2010

 

             Patient Education: Patient Medication Summary                      

     Completed    2010

 

                                        Appointment: Akanksha Driver

WPtel:[phone]

                                        [address] [place], [place] [address]

US                                              INJECTION       10/06/2010

 

             Patient Education: Patient Medication Summary                      

     Completed    10/06/2010

 

                                        Appointment: Akanksha Driver

WPtel:[phone]

                                        [address] [place] [address]                                              FOLLOW UP       2010

 

             Patient Education: Patient Medication Summary                      

     Completed    2010

 

             Patient Education: Lexapro                           Completed    0

2010

 

                          Visit Plan:               May proceed with hiatal mykel

ia repair per Card. so will contact Dr. Cash's office to proceed with surgery Trial of Lexapro 5mg QD plus use 
xanax prn

                                                            2010

 

                                        Appointment: Akanksha Driver

WPtel:[phone]

                                        [address] [place] [address]                                              FOLLOW UP       2010

 

             Patient Education: Patient Medication Summary                      

     Completed    2010

 

                          Visit Plan:               B12 given Cont oral B12 and 

iron Fwup 1mo for B12 Proceed with 

hiatal hernia repair once card clearance

                                                            2010

 

                                        Appointment: Akanksha Driver

WPtel:[phone]

                                        [address] [place], [place] [id number]

                                              FOLLOW UP       2010

 

             Patient Education: Patient Medication Summary                      

     Completed    2010

 

                                        Appointment: Akanksha Driver

WPtel:[phone]

                                        [address] [place], [place] [id number]

                                              FOLLOW UP       2010

 

             Patient Education: Patient Medication Summary                      

     Completed    2010

 

                                        Appointment: Akanksha Driver

WPtel:[phone]

                                        [address] [place], [place] [id number]

[place]                                              LAB             2010

 

             Patient Education: Patient Medication Summary                      

     Completed    2010

 

                          Visit Plan:               Check fasting lab in AM--CMP

,Lipids, CBC, Vit D, TSH,FreeT4, B12

                                                            2010

 

                                        Appointment: Akanksha Driver

WPtel:[phone]

[id number]

                                              FOLLOW UP       2010

 

             Patient Education: Patient Medication Summary                      

     Completed    2010

 

                                        Referral: Landon De La Torre

WPtel:[phone]

[id number]

[place]              Referral                        Appointment Requested  

 

                                        Referral: Landon De La Torre

WPtel:[phone]

#[address] [id number]

              Referral                        Initiated        







Instructions





                                        Comment

 

                                        . Supportive care.  Rest, Fluids, Tyleno

l/Motrin prn fever or bodyaches.  Notify

if worsening symptoms.



 

                                        . Add miralax daily

Use daily probiotic and metamucil and push fluids

Notify if worsens/persists

 

                                        . No NSAIDs

Kidney function discussed

Discussed hydration 

Monitor lab every 4months so will check CMP, HbA1C next month

 

                                        . Vestibular exercises

Refill flonase

Strict low Na diet--discussed that needs to read labels

Rx for walker with chair given due to muscle weakness/unsteadiness

Has carotids and abdominal aorta and legs checked in January

Check Chem 7



 

                                        . Check CMP, CBC, TSH, Free T4, B12, Lip

ids, HbA1C

Discussed 6 small meals a day each with protein

Would likely benefit from antidepressant 

Update colonoscopy

 

                                        . Cerumen flush with warm water and tyler

xide - good results 

Resume Nasonex nasal spray daily

Recommended Debrox earwax removal drops 

 

                                        . Continue aspirin daily

Add meloxicam for 1week

Elevate and Ice

Call on Monday

 

                                        . Been using baclofen at bedtime and has

 helped some

PT for next 2-4weeks



 

                                        . Continue exercise and current meds

See surgery for removal of right arm lesion

 

                                        . Discussed that likely lumbar etiology 

for leg weakness

Will change amlodopine to low dose dyazide and see if helps legs and inner ear

 

                                        . Ceftin and continue claritin/flonase

Decrease amlodopine to 2.5mg q HS until fwup with Card due to weakness

 

                                        .  Supportive care.  Rest, Fluids, Tylen

ol prn fever or bodyaches.  Notify if 

worsening symptoms.

Ceftin

 

                                        . Restart flonase BID

Use meclizine 25mg q HS

Vestibular exercises

Claritin 10mg q AM

See ENT if doesn't resolve

 

                                        . Will continue to observe



 

                                        . ERx for Augmentin 875mg q12 x 10 days 

and Floxin otic drops 5 gtts AD x7days

Sample of Nasonex per pt request

Discussed treatment (nasal saline, salt water gargles, keeping right ear clean 
and dry, for worsening go to UC on weekend, Tylenol/ibuprofen, etc.)

RTC 2 weeks for ear recheck.

 

                                        . Decrease caffeine intake

Check Bilateral Mammogram with US of left breast

 

                                        Left ear flushed with warm water and per

oxide with ear syringe and then last 

removed with currette--peroxide drops instilled.  Tolerated well, no 
complications, TM intact.. Continue off carafate and see how does

Continue probiotic

Add Vestibular exercises and use meclizine prn

Continue Nasonex

Check fasting lab including CMP, Lipids, CBC, TSH, Free T4, HbA1C

 

                                        . Continue carafate for 4more weeks at c

urrent dose then decrease to BID for 

2wks then q HS

 

                                        . Continue omeprazole

Add Carafate 1gm po q AC

Add daily probiotic



 

                                        . Diabetic Diet

Accuchecks BID alternating times

Hold onglyza and Januvia for now and continue diet and exercise and weight loss 
and moniter BS

Discussed hypoglycemia and snack of peanut butter and crackers with juice or 
milk

 

                                        Informed Consent obtainded.  Right SI yasir

int cleansed with alcohol and betadine 

and injected with 3cc 1%l lidocaine with 40mg depomedrol and 40mg kenalog, 
tolerated well with no complications, neosporin and bandage applied. Injection 
to Right SI joint as above

Pt will call in 3 days on pain

Has PT starting in 2wks.

 

                                        . OMT done

Start PT

May need updated MRI if need to consider epidural

Prednisone

 

                                        . Flexeril and Vimovo

OMT done

Daily stretches

 

                                        Right SI joint cleansed with alchohol an

d betadine and injected with 3cc 1% 

lidocaine with 40mg depomedrol and 40mg kenalog, tolerated well with no 
complications, neosporin and bandage applied. OMT done

Daily stretches

Moist heat or biofreeze

Right SI joint injection

Call in 1week

Vimovo BID

 

                                        . Decrease amlodopine to 1.25mg daily

Schedule with Cardiology

Fasting lab in AM

 

                                        .  Saline nasal flushes prn. Tylenol/Mot

rin prn headache.  Notify if 

persists/symptoms worsening.

Cerumen removal from ears as above



 

                                        . Continue vimovo for 1more week

Increase Omeprazole to BID for 1mo then resume QD if stomach improved

Vestibular exercises with meclizine q HS for next week

 

                                        . Saline nasal flushes prn. Tylenol/Motr

in prn headache.  Notify if 

persists/symptoms worsening.

Add Veramyst

Increase Omeprazole to 20mg po BID

 

                                        . Nasocourt sample given.  Pt. will noti

fy if symptoms are worse on Monday. 

 

                                        . May proceed with hiatal hernia repair 

per Card. so will contact Dr. Cash's office to proceed with surgery



Trial of Lexapro 5mg QD plus use xanax prn

 

                                        . B12 given

Cont oral B12 and iron

Fwup 1mo for B12

Proceed with hiatal hernia repair once card clearance

 

                                        . Check fasting lab in AM--CMP,Lipids, C

BC, Vit D, TSH,FreeT4, B12







Medical Equipment

No Medical Equipment data



Health Concerns Section

Health Concerns data not found



Goals Section

Goals data not found



Interventions Section

Interventions data not found



Health Status Evaluations/Outcomes Section

Health Status Evaluations/Outcomes data not found



Advance Directives

No Advance Directive data

## 2020-02-19 NOTE — XMS REPORT
CCD document using C-CDA

                             Created on: 2020



Leilani Ramírez

External Reference #: [id number]

: 1940

Sex: Female



Demographics





                          Address                   [address] [place]  [address]

 

                          Home Phone                [phone]

 

                          Preferred Language        Unknown

 

                          Marital Status            

 

                          Roman Catholic Affiliation     Unknown

 

                          Race                      White

 

                          Ethnic Group              Not  or 





Author





                          Author                    Leilani Driver D.O.

 

                          Organization              [facility]

 

                          Address                   [address] [place]  [address]



 

                          Phone                     [phone]







Care Team Providers





                    Care Team Member Name Role                Phone

 

                    Akanksha Driver D.O. PP                  Unavailable

 

                          CCM                       Unavailable







Summary Purpose

Interface Exchange



Insurance Providers





             Payer name   Policy type / Coverage type Covered party ID Effective

 Begin Date 

Effective End Date

 

             [employer] [place] B [id number]  2018   

  Unknown

 

             [employer] Senior Supplemental Medicare Part B [id number]   [id number]    

 Unknown







Family history





Father



                          Diagnosis                 Age At Onset

 

                          Heart disease             Unknown







Mother



                          Diagnosis                 Age At Onset

 

                          Heart disease             Unknown

 

                          Cancer                    Unknown







Social History





                Social History Element Codes           Description     Effective

 Dates

 

                Tobacco history SNOMED CT: 605740816 Never smoker    2011

 

                Marital status  Unknown         Single          2010

 

                Number of children Unknown         9               2010







Allergies, Adverse Reactions, Alerts





           Substance  Reaction   Codes      Entered Date Inactivated Date Status

 

           _                     Unknown    2019 No Inactive Date Active

 

           * NO KNOWN FOOD ALLERGIES            Unknown    2010 No Inactiv

e Date Active

 

           _                     Unknown    2010 No Inactive Date Active

 

           QUINIDINE-QUININE ANALOGUES hives,     Unknown    2010 No Inact

diamante Date Active







Problems





                Condition       Codes           Effective Dates Condition Status

 

                          Essential (primary) hypertension ICD-9: 401.9

ICD-10: I10               2014                Active

 

                          Mixed hyperlipidemia      ICD-9: 272.4

ICD-10: E78.2             2019                Active

 

                          FLU VACCINE               ICD-9: V04.81

ICD-10: Z23               2012                Active

 

                          Acute serous otitis media, left ear ICD-9: 381.01

ICD-10: H65.02            2019                Active

 

                          Coronary atherosclerosis due to calcified coronary les

ion ICD-9: 414.00

ICD-10: I25.84            2018                Active

 

                          Hypoglycemia, unspecified ICD-9: 251.2

ICD-10: E16.2             2017                Active

 

                          Other fatigue             ICD-9: 780.79

ICD-10: R53.83            2017                Active

 

                          Urinary tract infection, site not specified ICD-9: 599

.0

ICD-10: N39.0             10/02/2018                Active

 

                          Gastro-esophageal reflux disease without esophagitis I

CD-9: 530.81

ICD-10: K21.9             2018                Active

 

                          Epigastric pain           ICD-9: 789.06

ICD-10: R10.13            2018                Active

 

                          Atherosclerotic heart disease of native coronary arter

y without angina pectoris 

ICD-9: 414.00

ICD-10: I25.10            2018                Active

 

                          Generalized anxiety disorder ICD-9: 300.00

ICD-10: F41.1             2018                Active

 

                          Palpitations              ICD-9: 785.1

ICD-10: R00.2             10/02/2018                Active

 

                          Dizziness and giddiness   ICD-9: 780.4

ICD-10: R42               2014                Active

 

                          Occlusion and stenosis of bilateral carotid arteries I

CD-9: 433.10

ICD-10: I65.23            2018                Active

 

                          Cervicalgia               ICD-9: 723.1

ICD-10: M54.2             2018                Active

 

                          Other spondylosis with radiculopathy, cervical region 

ICD-9: 721.0

ICD-10: M47.22            2018                Active

 

                          Cervicocranial syndrome   ICD-9: 723.2

ICD-10: M53.0             2018                Active

 

                          Vertigo of central origin, bilateral ICD-9: 386.2

ICD-10: H81.43            2018                Active

 

                          Benign paroxysmal vertigo, bilateral ICD-9: 386.11

ICD-10: H81.13            2018                Active

 

                          Otalgia, bilateral        ICD-9: 388.70

ICD-10: H92.03            2018                Active

 

                          Left lower quadrant pain  ICD-9: 789.04

ICD-10: R10.32            2017                Active

 

                          Low back pain             ICD-9: 724.2

ICD-10: M54.5             2017                Active

 

                          Radiculopathy, lumbosacral region ICD-9: 724.4

ICD-10: M54.17            2018                Active

 

                          Impaired fasting glucose  ICD-9: 790.29

ICD-10: R73.01            2012                Active

 

                          Other intervertebral disc degeneration, lumbar region 

ICD-9: 722.52

ICD-10: M51.36            2017                Active

 

                          Pain in thoracic spine    ICD-9: 724.1

ICD-10: M54.6             2017                Active

 

                          Mastodynia                ICD-9: 611.71

ICD-10: N64.4             2017                Active

 

                          Acute upper respiratory infection, unspecified ICD-9: 

465.9

ICD-10: J06.9             2017                Active

 

                          Acute mastoiditis without complications, right ear ICD

-9: 383.00

ICD-10: H70.001           2016                Active

 

                          Constipation, unspecified ICD-9: 564.00

ICD-10: K59.00            2016                Active

 

                          Chronic kidney disease, stage 1 ICD-9: 585.1

ICD-10: N18.1             03/15/2016                Active

 

                          History of falling        ICD-9: V15.88

ICD-10: Z91.81            12/15/2015                Active

 

                          Muscle weakness (generalized) ICD-9: 780.79

ICD-10: M62.81            2015                Active

 

                Acute renal failure ICD-9: 584.9    2015      Active

 

                POLYURIA        ICD-9: 788.42   2015      Active

 

                CAD             ICD-9: 414.00   09/10/2015      Active

 

                Hyperglycemia   ICD-9: 790.29   2012      Active

 

                HYPERLIPIDEMIA NEC/NOS ICD-9: 272.4    09/10/2015      Active

 

                ABDOMINAL PAIN  ICD-9: 789.00   10/10/2012      Active

 

                Grieving        ICD-9: 309.0    2015      Active

 

                HYPOGLYCEMIA    ICD-9: 251.2    2012      Active

 

                MALAISE AND FATIGUE ICD-9: 780.79   2015      Active

 

                CERUMEN IMPACTION ICD-9: 380.4    2015      Active

 

                Leg pain        ICD-9: 729.5    2015      Active

 

                SUPERFIC PHLEBITIS-LEG ICD-9: 451.0    2015      Active

 

                SPASM OF MUSCLE ICD-9: 728.85   2015      Active

 

                Thoracic back pain ICD-9: 724.1    2015      Active

 

                Benign positional vertigo ICD-9: 386.11   2015      Active

 

                Suspicious nevus ICD-9: 238.2    2015      Active

 

                EUSTACHIAN TUBE DYSFUNCTION ICD-9: 381.81   2014      Acti

ve

 

                SINUSITIS, ACUTE ICD-9: 461.9    2014      Active

 

                DIZZINESS/VERTIGO ICD-9: 780.4    2014      Active

 

                HYPERTENSION    ICD-9: 401.9    2014      Active

 

                URI, ACUTE      ICD-9: 465.9    10/15/2013      Active

 

                CEPHALGIA       ICD-9: 784.0    2013      Active

 

                OTITIS MEDIA NOS ICD-9: 382.9    2013      Active

 

                Perforation of ear drum ICD-9: 384.20   05/10/2013      Active

 

                Mastalgia       ICD-9: 611.71   2013      Active

 

                DYSPEPSIA       ICD-9: 536.8    10/10/2012      Active

 

                IBS             ICD-9: 564.1    10/10/2012      Active

 

                FLU VACCINE     ICD-9: V04.81   2012      Active

 

                Radiculopathy of leg ICD-9: 724.4    2012      Active

 

                SCIATICA        ICD-9: 724.3    2012      Active

 

                Lumbar degenerative disc disease ICD-9: 722.52   2012     

 Active

 

                Sacroiliac dysfunction ICD-9: 739.4    2012      Active

 

                Hypertension    Unknown         2012      Active

 

                PAIN, LOWER BACK ICD-9: 724.2    2011      Active

 

                SPINAL ENTHESOPATHY ICD-9: 720.1    2011      Active

 

                Hypotension     ICD-9: 458.9    2011      Active

 

                SACROILIITIS NEC ICD-9: 720.2    2011      Active

 

                PHARYNGITIS, ACUTE ICD-9: 462      2010      Active

 

                URINARY TRACT INFECTION ICD-9: 599.0    2010      Active

 

                Heat exhaustion ICD-9: 992.5    2010      Active

 

                Esophagitis     ICD-9: 530.10   2010      Active

 

                Gastritis       ICD-9: 535.50   2010      Active

 

                GERD            ICD-9: 530.81   2010      Active

 

                Hiatal hernia   ICD-9: 553.3    2010      Active

 

                B12 DEFIC ANEMIA NEC ICD-9: 281.1    2010      Active

 

                Anxiety         Unknown         2010      Active

 

                Heart disease   Unknown         2010      Active

 

                Hyperlipidemia  Unknown         2010      Active

 

                ANXIETY STATE NOS ICD-9: 300.00   2010      Active

 

                DEPRESSIVE DISORDER NEC ICD-9: 311      2010      Active







Medications





          Medication Codes     Instructions Start Date Stop Date Status    Fill 

Instructions

 

           simvastatin 40 mg tablet RxNorm: 452720 1 Tablet(s) PO QD 2019 

02/15/2020 

Active                                   

 

                omeprazole 40 mg capsule,delayed release RxNorm: 2003 Capsu

le(s) Oral QD 

2019          Active               

 

                Xanax 0.25 mg tablet RxNorm: 022618  TAKE 1 TABLET BY MOUTH TWIC

E DAILY 

10/29/2019          No Stop Date        Active               

 

                omeprazole 40 mg capsule,delayed release RxNorm: 2003 Capsu

le(s) PO QD 

10/07/2019          2019          Inactive             

 

             metoprolol tartrate 25 mg tablet RxNorm: 326706 1 Tablet(s) PO BID 

2019                Active                     

 

                omeprazole 40 mg capsule,delayed release RxNorm: 529146   Capsu

le(s) PO QD 

2019          10/06/2019          Inactive             

 

                    Flonase Allergy Relief 50 mcg/actuation nasal spray,suspensi

on RxNorm: 4851097     2

[place] [facility] 2019      No Stop Date    Active           

 

           simvastatin 40 mg tablet RxNorm: 981280 1 Tablet(s) PO QD 2019 

Inactive                                 

 

           simvastatin 40 mg tablet RxNorm: 426073 1 Tablet(s) PO QD 2019 

Inactive                                 

 

                omeprazole 40 mg capsule,delayed release RxNorm: 476509   Capsu

le(s) PO QD 

2019          Inactive             

 

           simvastatin 40 mg tablet RxNorm: 402519 1 Tablet(s) PO QD 2019 

Inactive                                 

 

                omeprazole 40 mg capsule,delayed release RxNorm: 421224  1 Capsu

le(s) PO QD 

2019          Inactive             

 

             Bactrim  mg-160 mg tablet RxNorm: 467279 1 Tablet(s) PO BID 0

3/08/2019   

2019                Inactive                   

 

             Bactrim  mg-160 mg tablet RxNorm: 814625 1 Tablet(s) PO BID 0

3/08/2019   

2019                Inactive                   

 

             Macrobid 100 mg capsule RxNorm: 353331 1 Capsule(s) PO BID 20

                          Inactive                   

 

             metoprolol tartrate 25 mg tablet RxNorm: 909314 1 Tablet(s) PO BID 

2019                Inactive                   

 

                Xanax 0.25 mg tablet RxNorm: 708941  TAKE 1 TABLET BY MOUTH TWIC

E DAILY 

2018          10/28/2019          Inactive             

 

           Xanax 0.25 mg tablet RxNorm: 012865 1 Tablet(s) PO BID 2018 

Inactive                                 

 

                    Protonix 40 mg tablet,delayed release RxNorm: 497910      1 

Tablet(s) PO BID for 

stomach--replaces omeprazole 2018      Inactive         

 

             Carafate 1 gram tablet RxNorm: 619978 1 Tablet(s) PO AC & HS 2019                Inactive                   

 

           Xanax 0.25 mg tablet RxNorm: 448068 1 Tablet(s) PO BID 10/30/2018 12/

10/2018 

Inactive                                 

 

             Bactrim  mg-160 mg tablet RxNorm: 239368 1 Tablet(s) PO BID 1

   

10/08/2018                Inactive                   

 

             Bactrim  mg-160 mg tablet RxNorm: 040421 1 Tablet(s) PO BID 1

   

10/03/2018                Inactive                   

 

             Carafate 1 gram tablet RxNorm: 650741 1 Tablet(s) PO AC & HS 09/18/

2018   

10/17/2018                Inactive                   

 

                    Protonix 40 mg tablet,delayed release RxNorm: 329577      1 

Tablet(s) PO BID for 

stomach--replaces omeprazole 2018      Inactive         

 

                    Protonix 40 mg tablet,delayed release RxNorm: 706633      1 

Tablet(s) PO BID for 

stomach--replaces omeprazole 2018      Inactive         

 

                    fluticasone 50 mcg/actuation nasal spray,suspension RxNorm: 

2297109     2 Spray 

NASAL QD to each nostril 2018      Inactive         

 

             Nasonex 50 mcg/actuation Spray RxNorm: 1392012 2 [place] [facility] 2018                Inactive                   

 

                omeprazole 40 mg capsule,delayed release RxNorm: 656126  1 Capsu

le(s) PO QD 

2018          08/15/2018          Inactive             

 

                    simvastatin 40 mg tablet RxNorm: 209163      1 Tablet(s) PO 

QD TAKE 1 TABLET EVERY 

DAY             2018      Inactive         

 

             metoprolol tartrate 25 mg tablet RxNorm: 466156 1/2 Tablet(s) PO QD

 2018                Inactive                   

 

                simvastatin 40 mg tablet RxNorm: 486464  Tablet(s) TAKE 1 TABLET

 EVERY DAY 

2017          Inactive             

 

             metoprolol tartrate 25 mg tablet RxNorm: 704042 1/2 Tablet(s) PO QD

 2017                Inactive                   

 

                    Flonase 50 mcg/actuation nasal spray,suspension RxNorm: 1797

933     2 [place] [facility] 

BID             2017      Inactive         

 

                omeprazole 40 mg capsule,delayed release RxNorm: 838613  1 Capsu

le(s) PO QD 

2017          Inactive             

 

             metoprolol tartrate 25 mg tablet RxNorm: 689764 1/2 Tablet(s) PO QD

 04/10/2017   

2017                Inactive                   

 

                    ciprofloxacin 0.2 % ear drops in a dropperette RxNorm: 34359

6      4 Drop(s) OTIC TID

for 1 week      2016      Inactive         

 

           cefdinir 300 mg capsule RxNorm: 347341 2 Capsule(s) PO QD 2016 

Inactive                                 

 

                    omeprazole 40 mg capsule,delayed release RxNorm: 802437     

 TAKE 1 CAPSULE EVERY DAY

                2016      Inactive         

 

             simvastatin 40 mg tablet RxNorm: 543774 TAKE 1 TABLET EVERY DAY 2017                Inactive                   

 

                    Flonase 50 mcg/actuation nasal spray,suspension RxNorm: 1797

933     2 [place] [facility] 

BID             2015      Inactive         

 

             cefuroxime axetil 250 mg tablet RxNorm: 725520 1 Tablet(s) PO BID 0

2015                Inactive                   

 

             cefuroxime axetil 250 mg tablet RxNorm: 060530 1 Tablet(s) PO BID 0

2015                Inactive                   

 

                omeprazole 40 mg capsule,delayed release RxNorm: 492564  1 Capsu

le(s) PO QD 

2015          Inactive             

 

           meloxicam 7.5 mg tablet RxNorm: 622422 1 Tablet(s) PO QD 2015 0

2015 

Inactive                                 

 

                loratadine 10 mg tablet RxNorm: 740398  1 Tablet(s) PO QAM for a

llergies 

2014          Inactive             

 

                    Flonase 50 mcg/actuation nasal spray,suspension RxNorm: 8963

23      2 [place] [facility] BID

                2014      12/15/2015      Inactive         

 

           prednisone 20 mg tablet RxNorm: 803413 2 Tablet(s) PO BID 2014 

Inactive                                 

 

             Dyazide 37.5 mg-25 mg capsule RxNorm: 127766 1 Capsule(s) PO QAM 2014                Inactive                   

 

           Ceftin 500 mg tablet RxNorm: 674717 1 Tablet(s) PO BID 2014 

Inactive                                 

 

           Ceftin 500 mg tablet RxNorm: 309409 1 Tablet(s) PO BID 2014 

Inactive                                 

 

                    simvastatin 40 mg tablet RxNorm: 088168      Tablet(s) PO TA

KE ONE TABLET BY MOUTH 

EVERY DAY       2014      Inactive         

 

                    metoprolol tartrate 25 mg tablet RxNorm: 125195      Tablet(

s) PO TAKE ONE-HALF 

TABLET BY MOUTH EVERY DAY 2014      Inactive         

 

           Ceftin 500 mg tablet RxNorm: 490104 1 Tablet(s) PO BID 10/15/2013 10/

 

Inactive                                 

 

                loratadine 10 mg tablet RxNorm: 142101  1 Tablet(s) PO QAM for a

llergies 

2013          Inactive             

 

                    omeprazole 20 mg capsule,delayed release RxNorm: 069296     

 Capsule(s) PO TAKE ONE 

CAPSULE BY MOUTH TWICE DAILY 2013      Inactive         

 

                omeprazole 20 mg capsule,delayed release RxNorm: 016827  1 Capsu

le(s) PO BID 

2013          Inactive             

 

             Augmentin 875 mg-125 mg tablet RxNorm: 365945 1 Tablet(s) PO Q12H 0

5/10/2013   

2013                Inactive                   

 

             Floxin Otic Drops 1 bottle Drops RxNorm:      5 Drop(s) OTIC BID 05

/10/2013   

2013                Inactive                   

 

                    metoprolol tartrate 25 mg tablet RxNorm: 221507      Tablet(

s) PO TAKE ONE-HALF 

TABLET BY MOUTH EVERY DAY 2013      Inactive         

 

                    simvastatin 40 mg tablet RxNorm: 327048      Tablet(s) PO TA

KE ONE TABLET BY MOUTH 

EVERY DAY       2013      Inactive         

 

                lancets         RxNorm:         Misc Miscellaneous USE ONE TO CH

YOGI GLUCOSE EVERY DAY 

2013          Inactive             

 

             Carafate 1 gram tablet RxNorm: 291864 1 Tablet(s) PO AC & HS 2015                Inactive                   

 

           Flagyl 500 mg tablet RxNorm: 511249 1 Tablet(s) PO TID 10/10/2012 10/

 

Inactive                                 

 

             Carafate 1 gram tablet RxNorm: 260975 1 Tablet(s) PO AC & HS 10/10/

2012   

2012                Inactive                   

 

          One Touch Test strips RxNorm:   Miscellaneous QD 2012

 Inactive  

one touch ultra mini test strips

 

           Protonix 40 mg Tab RxNorm: 474227 1 Tablet(s) PO QD 2012 

Inactive                                 

 

           Protonix 40 mg Tab RxNorm: 013700 1 Tablet(s) PO QD 2012 10/09/

2012 

Inactive                                 

 

           prednisone 20 mg Tab RxNorm: 788701 1 Tablet(s) PO BID 2012 

Inactive                                 

 

             Flexeril 5 mg Tab RxNorm: 807131 1 Tablet(s) PO QHS for spasm 2012                Inactive                   

 

             Flexeril 5 mg Tab RxNorm: 433119 1 Tablet(s) PO QHS for spasm 2012                Inactive                   

 

                amlodipine 2.5 mg Tab RxNorm: 713169  1/2 Tablet(s) PO QD replac

es 5mg dose 

2012          Inactive             

 

           simvastatin 40 mg tablet RxNorm: 250995 1 Tablet(s) PO QD 2012 

Inactive                                 

 

             metoprolol tartrate 25 mg tablet RxNorm: 001234 1/2 Tablet(s) PO QD

 2012                Inactive                   

 

                omeprazole 20 mg capsule,delayed release RxNorm: 766094  1 Capsu

le(s) PO BID 

2012          Inactive             

 

           cefdinir 300 mg Cap RxNorm: 471055 2 Capsule(s) PO QD 2011 

Inactive                                 

 

           simvastatin 40 mg Tab RxNorm: 524055 1 Tablet(s) PO QD 2011 

Inactive                                 

 

             metoprolol tartrate 25 mg Tab RxNorm: 665954 1/2 Tablet(s) PO QD 10

/   

2012                Inactive                   

 

             metoprolol tartrate 25 mg Tab RxNorm: 767348 1/2 Tablet(s) PO QD 10

/   

10/24/2011                Inactive                   

 

           meclizine 25 mg Tab RxNorm: 2068203 1 Tablet(s) PO QHS 2011 

Inactive                                for dizziness

 

           Ceftin 500 mg Tab RxNorm: 332746 1 Tablet(s) PO BID 2011 

Inactive                                 

 

                omeprazole 20 mg Cap, Delayed Release RxNorm: 828629  1 Capsule(

s) PO BID 

2011          10/24/2011          Inactive             

 

           simvastatin 40 mg Tab RxNorm: 095044 1 Tablet(s) PO QD 2011 

Inactive                                 

 

           simvastatin 40 mg Tab RxNorm: 216472 1 Tablet(s) PO QD 2011 

Inactive                                 

 

           simvastatin 40 mg Tab RxNorm: 366039 1 Tablet(s) PO QD 2010 

Inactive                                 

 

             Tessalon Perles 100 mg Cap RxNorm: 858708 1 Capsule(s) PO Q6-8H 2010                Inactive                   

 

           cefdinir 300 mg Cap RxNorm: 560251 1 Capsule(s) PO BID 2010 

Inactive                                 

 

           Lexapro 10 mg Tab RxNorm: 816040 1 Tablet(s) PO QD 2010

010 

Inactive                                 

 

           Cipro 250 mg Tab RxNorm: 823960 1 Tablet(s) PO BID 2010 10/04/2

010 

Inactive                                 

 

             Wellbutrin  mg 24 hr Tab RxNorm: 355573 1 Tablet(s) PO QAM 2010                Inactive                   

 

          Fish Oil 1,000 mg Cap RxNorm:   1 Capsule(s) PO QD No Start Date      

     Active     

 

                Tylenol Extra Strength 500 mg tablet RxNorm: 503263  1/2 Tablet(

s) PO as needed 

No Start Date                           Active               

 

                nitroglycerin 0.4 mg sublingual tablet RxNorm: 937911  Tablet(s)

 SL as needed No 

Start Date                              Active               

 

                    Calcium with Vitamin D 600 mg (1,500 mg)-400 unit tablet RxN

orm: 291577      1 

Tablet(s) PO QD No Start Date                   Active           

 

           Multivitamin & Mineral Formula Tab RxNorm:    1 Tablet(s) PO QD No St

art Date            

Active                                   

 

          vitamin B complex capsule RxNorm:   1 Capsule(s) PO QD No Start Date  

         Active     

 

          Aspirin 81 mg Tab RxNorm: 302998 1 Tablet(s) PO QD No Start Date      

     Active     

 

                    isosorbide mononitrate ER 30 mg tablet,extended release 24 h

r RxNorm: 111364      1 

Tablet(s) PO QHS No Start Date                   Active           

 

           Vitamin D 1,000 unit Cap RxNorm: 069215 1 Capsule(s) PO QD No Start D

ate            

Active                                   

 

          Co Q-10 oral RxNorm: 67032 oral      No Start Date           Active   

  

 

             metoprolol tartrate 25 mg Tab RxNorm: 647129 1/2 Tablet(s) PO QD No

 Start Date 

10/23/2011                Inactive                   

 

             Nasonex 50 mcg/actuation Spray RxNorm: 6833381 2 Spray NASAL No Sta

rt Date 

2018                Inactive                   

 

           Vitamin B12 1000mcg Tablet RxNorm:    1 Tablet(s) PO QD No Start Date

 2015 

Inactive                                 

 

           amlodipine 5 mg Tab RxNorm: 923004 1 Tablet(s) PO QD No Start Date  

Inactive                                 

 

             simvastatin 40 mg tablet RxNorm: 253704 1 Tablet(s) PO QD No Start 

Date 

2019                Inactive                   

 

                omeprazole 20 mg capsule,delayed release RxNorm: 939417  1 Capsu

le(s) PO BID No 

Start Date          2013          Inactive             

 

                omeprazole 40 mg capsule,delayed release RxNorm: 506549  1 Capsu

le(s) PO QD No 

Start Date          2019          Inactive             

 

           Nexium 40 mg Cap RxNorm: 302382 1 Capsule(s) PO QD No Start Date  

Inactive                                 

 

             Stool Softener 100 mg Tab RxNorm: 6955796 2 Tablet(s) PO BID No Sta

rt Date 

2012                Inactive                   

 

                    Calcium with Vitamin D 600 mg (1,500 mg)-400 unit Tab RxNorm

: 081787      1 Tablet(s)

PO QD           No Start Date   2015      Inactive         

 

             iron 325 mg (65 mg iron) Tab RxNorm: 067717 1 Tablet(s) PO QD No St

art Date 

2015                Inactive                   

 

                Flonase 50 mcg/actuation Nasal Spray RxNorm: 659398  2 [place] ROZ

AL BID No Start 

Date                2014          Inactive             

 

                    fluticasone 50 mcg/actuation nasal spray,suspension RxNorm: 

4092104     2 Spray 

NASAL QD to each nostril No Start Date   2018      Inactive         

 

             Nasonex 50 mcg/actuation Spray RxNorm: 9591105 2 Spray NASAL QD No 

Start Date 

2018                Inactive                   

 

                    hydralazine 50 mg tablet RxNorm: 381911      1 Tablet(s) PO 

as needed for BP over 

160/90          No Start Date   2018      Inactive         

 

             Vimovo 500 mg-20 mg 12 hr Tab RxNorm: 193354 1 Tablet(s) PO BID No 

Start Date 

2011                Inactive                   

 

             Tylenol PM 25 mg-500 mg/15 mL Oral Soln RxNorm: 1214141 1 PO QPM   

  No Start Date 

2015                Inactive                   

 

          Iron (Ferrous Sulfate) Oral RxNorm:   Oral      No Start Date 20

12 Inactive   

 

             Reglan 10 mg Tab RxNorm: 378818 1 Tablet(s) PO TID before meals No 

Start Date 

2011                Inactive                   

 

           Xanax 1 mg Tab RxNorm: 213346 1/2 Tablet(s) PO QD No Start Date  

Inactive                                 

 

             amlodipine 10 mg tablet RxNorm: 795513 1 Tablet(s) PO QD No Start D

ate 

2014                Inactive                   

 

          Iron (dried) Oral RxNorm:   Oral      No Start Date 2012 Inactiv

e   

 

                metoprolol tartrate 50 mg tablet RxNorm: 407215  1 Tablet(s) PO 

BID No Start Date

                    12/10/2018          Inactive             

 

           Xanax 0.25 mg tablet RxNorm: 329138 1 Tablet(s) PO BID No Start Date 

10/29/2018 

Inactive                                 

 

                lancets         RxNorm:         Miscellaneous check blood sugar 

at least once daily No Start 

Date                2013          Inactive             

 

           simvastatin 40 mg Tab RxNorm: 598542 1 Tablet(s) PO QD No Start Date 

2010 

Inactive                                 

 

                    isosorbide mononitrate ER 30 mg tablet,extended release 24 h

r RxNorm: 395945      1 

Tablet(s) PO QHS No Start Date   2019      Inactive         

 

             amlodipine 2.5 mg tablet RxNorm: 121976 1 Tablet(s) PO QD No Start 

Date 

2014                Inactive                   

 

             sucralfate 1 gram tablet RxNorm: 184083 1 Tablet(s) PO QID No Start

 Date 

2019                Inactive                   

 

          Fish Oil Oral RxNorm:   Oral      No Start Date 2012 Inactive   

 

          Multiple Vitamin Oral RxNorm:   Oral      No Start Date 2012 [place]

ctive   

 

                metoprolol tartrate 25 mg tablet RxNorm: 083647  1/2 Tablet(s) P

O QD No Start 

Date                2017          Inactive             

 

                metoprolol tartrate 50 mg tablet RxNorm: 011750  1/2 Tablet(s) P

O BID No Start 

Date                2019          Inactive             

 

                    Flonase Allergy Relief 50 mcg/actuation nasal spray,suspensi

on RxNorm: 2606621     2

[place] NASAL QHS No Start Date   2019      Inactive         







Medication Administered

No Medication Administered data



Immunizations





                Vaccine         Codes           Date            Status

 

                Influenza       CVX: 135        10/22/2019      Complete

 

                Influenza       CVX: 135        10/22/2019      Complete

 

                Influenza       CVX: 135        2018      Complete

 

                Influenza       CVX: 135        10/06/2017      Complete

 

                Influenza       CVX: 135        10/07/2016      Complete

 

                Influenza       CVX: 135        10/16/2015       

 

                Influenza       CVX: 141        2013       

 

                Influenza (Adult) CVX: 141        10/06/2010       







Results





          Observation Observation Code Item      Item Code Result    Date      S

ervice Location

 

          GFR CALC  2321748   GFR Non Afr Amr           52 mL/min 10/11/2019 Unk

nown

 

          GFR CALC  5914491   GFR Afr Amr           >60 mL/min 10/11/2019 Unknow

n

 

          COMPREHENSIVE METABOLIC 28232     AST                 17 U/L    10/11/

2019 Unknown

 

          COMPREHENSIVE METABOLIC 62549     ALT                 11 U/L    10/11/

2019 Unknown

 

          COMPREHENSIVE METABOLIC 87413     BUN                 17 mg/dL  10/11/

2019 Unknown

 

          COMPREHENSIVE METABOLIC 76011     ALBUMIN             3.9 g/dL  10/11/

2019 Unknown

 

          COMPREHENSIVE METABOLIC 52112     CHLORIDE            108 mmol/L 10/11

/2019 Unknown

 

          COMPREHENSIVE METABOLIC 96840     Bili Total           0.5 mg/dL 10/11

/2019 Unknown

 

          COMPREHENSIVE METABOLIC 51236     ALK PHOS            72 U/L    10/11/

2019 Unknown

 

          COMPREHENSIVE METABOLIC 31264     SODIUM              142 mmol/L 10/11

/2019 Unknown

 

          COMPREHENSIVE METABOLIC 42169     CREATININE           1.02 mg/dL 10/1

2019 Unknown

 

          COMPREHENSIVE METABOLIC 88322     CALCIUM             9.3 mg/dL 10/11/

2019 Unknown

 

          COMPREHENSIVE METABOLIC 73263     POTASSIUM           4.0 mmol/L 10/11

/2019 Unknown

 

          COMPREHENSIVE METABOLIC 00244     Total Protein           6.2 g/dL  10

/2019 Unknown

 

          COMPREHENSIVE METABOLIC 64860     Glucose             93 mg/dL  10/11/

2019 Unknown

 

          COMPREHENSIVE METABOLIC 20970     Bicarbonate           27 mmol/L 10/1

2019 Unknown

 

          COMPREHENSIVE METABOLIC 29838     AGAP                7 mmol/L  10/11/

2019 Unknown

 

          GFR CALC  5136421   GFR Non Afr Amr           48 mL/min 06/10/2019 Unk

nown

 

          GFR CALC  0564255   GFR Afr Amr           59 mL/min 06/10/2019 Unknown

 

          THYROID STIMULATING HORMONE 79693     TSH                 1.936 uIU/mL

 06/10/2019 Unknown

 

          COMPREHENSIVE METABOLIC 57044     AST                 18 U/L    06/10/

2019 Unknown

 

          COMPREHENSIVE METABOLIC 02464     ALT                 11 U/L    06/10/

2019 Unknown

 

          COMPREHENSIVE METABOLIC 47909     BUN                 18 mg/dL  06/10/

2019 Unknown

 

          COMPREHENSIVE METABOLIC 65366     ALBUMIN             4.2 g/dL  06/10/

2019 Unknown

 

          COMPREHENSIVE METABOLIC 95200     CHLORIDE            109 mmol/L 06/10

/2019 Unknown

 

          COMPREHENSIVE METABOLIC 02477     Bili Total           0.4 mg/dL 06/10

/2019 Unknown

 

          COMPREHENSIVE METABOLIC 17134     ALK PHOS            64 U/L    06/10/

2019 Unknown

 

          COMPREHENSIVE METABOLIC 49886     SODIUM              142 mmol/L 06/10

/2019 Unknown

 

          COMPREHENSIVE METABOLIC 12683     CREATININE           1.09 mg/dL  Unknown

 

          COMPREHENSIVE METABOLIC 72663     CALCIUM             9.3 mg/dL 06/10/

2019 Unknown

 

          COMPREHENSIVE METABOLIC 15886     POTASSIUM           4.7 mmol/L 06/10

/2019 Unknown

 

          COMPREHENSIVE METABOLIC 42681     Total Protein           6.3 g/dL  06

/10/2019 Unknown

 

          COMPREHENSIVE METABOLIC 94871     Glucose             84 mg/dL  06/10/

2019 Unknown

 

          COMPREHENSIVE METABOLIC 11914     Bicarbonate           25 mmol/L  Unknown

 

          COMPREHENSIVE METABOLIC 18887     AGAP                8 mmol/L  06/10/

2019 Unknown

 

          COMPLETE BLOOD COUNT [id number]   WBC                 7.8 10e9/L 06/10/20

19 Unknown

 

          COMPLETE BLOOD COUNT [id number]   RBC                 4.04 10e12/L 06/10/

2019 Unknown

 

          COMPLETE BLOOD COUNT [id number]   HEMOGLOBIN           12.2 g/dL 06/10/20

19 Unknown

 

          COMPLETE BLOOD COUNT [id number]   HEMATOCRIT           38.6 %    06/10/20

19 Unknown

 

          COMPLETE BLOOD COUNT [id number]   MCV                 95.5 fL   06/10/201

9 Unknown

 

          COMPLETE BLOOD COUNT [id number]   MCH                 30.2 pg   06/10/201

9 Unknown

 

          COMPLETE BLOOD COUNT [id number]   MCHC                31.6 g/dL 06/10/201

9 Unknown

 

          COMPLETE BLOOD COUNT [id number]   PLATELET COUNT           215 10e9/L 06/

10/2019 Unknown

 

          COMPLETE BLOOD COUNT [id number]   Mean Plt Volume           11.2 fL   06/

10/2019 Unknown

 

          COMPLETE BLOOD COUNT [id number]   Neut Auto           45.7 %    06/10/201

9 Unknown

 

          COMPLETE BLOOD COUNT [id number]   Lymph Auto           42.1 %    06/10/20

19 Unknown

 

          COMPLETE BLOOD COUNT [id number]   Mono Auto           9.2 %     06/10/201

9 Unknown

 

          COMPLETE BLOOD COUNT [id number]   RDW                 13.4 %    06/10/201

9 Unknown

 

          COMPLETE BLOOD COUNT [id number]   Eos Auto            2.7 %     06/10/201

9 Unknown

 

          COMPLETE BLOOD COUNT [id number]   Baso Auto           0.3 %     06/10/201

9 Unknown

 

          COMPLETE BLOOD COUNT [id number]   Neutrophil Abs           3.56 10e9/L 06

/10/2019 Unknown

 

          COMPLETE BLOOD COUNT [id number]   Lymphocyte Abs           3.28 10e9/L 06

/10/2019 Unknown

 

          COMPLETE BLOOD COUNT [id number]   Monocyte Abs           0.72 10e9/L  Unknown

 

          COMPLETE BLOOD COUNT [id number]   Eosinophil Abs           0.21 10e9/L 06

/10/2019 Unknown

 

          COMPLETE BLOOD COUNT [id number]   RDW-SD              44.9 fL   06/10/201

9 Unknown

 

          COMPLETE BLOOD COUNT [id number]   Basophil Abs           0.02 10e9/L  Unknown

 

          COMPLETE BLOOD COUNT [id number]   WBC                 5.2 10e9/L 20

18 Unknown

 

          COMPLETE BLOOD COUNT [id number]   RBC                 4.21 10e12/L 2018 Unknown

 

          COMPLETE BLOOD COUNT [id number]   HEMOGLOBIN           12.8 g/dL 20

18 Unknown

 

          COMPLETE BLOOD COUNT [id number]   HEMATOCRIT           39.0 %    20

18 Unknown

 

          COMPLETE BLOOD COUNT [id number]   MCV                 92.6 fL   

8 Unknown

 

          COMPLETE BLOOD COUNT [id number]   MCH                 30.4 pg   

8 Unknown

 

          COMPLETE BLOOD COUNT [id number]   MCHC                32.8 g/dL 

8 Unknown

 

          COMPLETE BLOOD COUNT [id number]   PLATELET COUNT           202 10e9/L  Unknown

 

          COMPLETE BLOOD COUNT [id number]   Mean Plt Volume           10.7 fL    Unknown

 

          COMPLETE BLOOD COUNT [id number]   Neut Auto           40.9 %    

8 Unknown

 

          COMPLETE BLOOD COUNT [id number]   Lymph Auto           46.3 %    20

18 Unknown

 

          COMPLETE BLOOD COUNT [id number]   Mono Auto           9.3 %     

8 Unknown

 

          COMPLETE BLOOD COUNT [id number]   RDW                 13.7 %    

8 Unknown

 

          COMPLETE BLOOD COUNT [id number]   Eos Auto            2.9 %     

8 Unknown

 

          COMPLETE BLOOD COUNT [id number]   Baso Auto           0.6 %     

8 Unknown

 

          COMPLETE BLOOD COUNT [id number]   Neutrophil Abs           2.13 10e9/L  Unknown

 

          COMPLETE BLOOD COUNT [id number]   Lymphocyte Abs           2.41 10e9/L  Unknown

 

          COMPLETE BLOOD COUNT [id number]   Monocyte Abs           0.48 10e9/L  Unknown

 

          COMPLETE BLOOD COUNT [id number]   Eosinophil Abs           0.15 10e9/L  Unknown

 

          COMPLETE BLOOD COUNT [id number]   RDW-SD              45.2 fL   

8 Unknown

 

          COMPLETE BLOOD COUNT [id number]   Basophil Abs           0.03 10e9/L  Unknown

 

          [place] [address]     Glucose             118 mg/dL  Unknown

 

          [place] [address]     CREATININE           1.01 mg/dL  Unknown

 

          [place] [address]     BUN                 14 mg/dL   Unknown

 

          [place] [address]     SODIUM              141 mmol/L  Unknown

 

          [place] [address]     POTASSIUM           4.0 mmol/L  Unknown

 

          [place] [address]     CHLORIDE            108 mmol/L  Unknown

 

          [place] [address]     Bicarbonate           25 mmol/L  Unknown

 

          [place] [address]     AGAP                8 mmol/L   Unknown

 

          [place] [address]     CALCIUM             9.6 mg/dL  Unknown

 

          FREE T4   39022     T4 Free             1.23 ng/dL 2018 Unknown

 

          GFR CALC  4081296   GFR Non Afr Amr           53 mL/min 2018 Unk

nown

 

          GFR CALC  4068305   GFR Afr Amr           >60 mL/min 2018 Unknow

n

 

          THYROID STIMULATING HORMONE 87726     TSH                 2.124 uIU/mL

 2018 Unknown

 

          LIPID GROUP 14710     Cholesterol           152 mg/dL 2018 Unkno

wn

 

          LIPID GROUP 38245     Triglyceride           151 mg/dL 2018 Unkn

own

 

          LIPID GROUP 25523     HDL CHOLESTEROL           47 mg/dL  2018 U

nknown

 

          LIPID GROUP 68334     Chol/HDL Ratio           3.23 ratio 2018 U

nknown

 

          LIPID GROUP 09204     NON-HDL Chol           105 mg/dL 2018 Unkn

own

 

          LIPID GROUP 95183     LDL Cholesterol           75 mg/dL  2018 U

nknown

 

          ASSAY OF TROPONIN QUANT 77154     Troponin-I           <0.30 ng/mL  Unknown

 

          COMPREHENSIVE METABOLIC 05194     AST                 20 U/L    2018 Unknown

 

          COMPREHENSIVE METABOLIC 84879     ALT                 14 U/L    2018 Unknown

 

          COMPREHENSIVE METABOLIC 82609     BUN                 19 mg/dL  2018 Unknown

 

          COMPREHENSIVE METABOLIC 17657     ALBUMIN             4.2 g/dL  2018 Unknown

 

          COMPREHENSIVE METABOLIC 85467     CHLORIDE            102 mmol/L  Unknown

 

          COMPREHENSIVE METABOLIC 08046     Bili Total           0.4 mg/dL  Unknown

 

          COMPREHENSIVE METABOLIC 89220     ALK PHOS            66 U/L    2018 Unknown

 

          COMPREHENSIVE METABOLIC 89412     SODIUM              135 mmol/L  Unknown

 

          COMPREHENSIVE METABOLIC 87251     CREATININE           1.01 mg/dL  Unknown

 

          COMPREHENSIVE METABOLIC 32239     CALCIUM             9.3 mg/dL 2018 Unknown

 

          COMPREHENSIVE METABOLIC 83157     POTASSIUM           4.8 mmol/L  Unknown

 

          COMPREHENSIVE METABOLIC 57885     Total Protein           7.0 g/dL   Unknown

 

          COMPREHENSIVE METABOLIC 79190     Glucose             91 mg/dL  2018 Unknown

 

          COMPREHENSIVE METABOLIC 99625     Bicarbonate           23 mmol/L  Unknown

 

          COMPREHENSIVE METABOLIC 56197     AGAP                10 mmol/L 2018 Unknown

 

          COMPLETE BLOOD COUNT [id number]   WBC                 7.5 10e9/L 20

18 Unknown

 

          COMPLETE BLOOD COUNT [id number]   RBC                 4.13 10e12/L 2018 Unknown

 

          COMPLETE BLOOD COUNT [id number]   HEMOGLOBIN           12.6 g/dL 20

18 Unknown

 

          COMPLETE BLOOD COUNT [id number]   HEMATOCRIT           38.4 %    20

18 Unknown

 

          COMPLETE BLOOD COUNT [id number]   MCV                 93.0 fL   

8 Unknown

 

          COMPLETE BLOOD COUNT [id number]   MCH                 30.5 pg   

8 Unknown

 

          COMPLETE BLOOD COUNT [id number]   MCHC                32.8 g/dL 

8 Unknown

 

          COMPLETE BLOOD COUNT [id number]   PLATELET COUNT           204 10e9/L  Unknown

 

          COMPLETE BLOOD COUNT [id number]   Mean Plt Volume           10.9 fL    Unknown

 

          COMPLETE BLOOD COUNT [id number]   Neut Auto           43.1 %    

8 Unknown

 

          COMPLETE BLOOD COUNT [id number]   Lymph Auto           45.0 %    20

18 Unknown

 

          COMPLETE BLOOD COUNT [id number]   Mono Auto           9.2 %     

8 Unknown

 

          COMPLETE BLOOD COUNT [id number]   RDW                 13.4 %    

8 Unknown

 

          COMPLETE BLOOD COUNT [id number]   Eos Auto            2.3 %     

8 Unknown

 

          COMPLETE BLOOD COUNT [id number]   Baso Auto           0.4 %     

8 Unknown

 

          COMPLETE BLOOD COUNT [id number]   Neutrophil Abs           3.23 10e9/L  Unknown

 

          COMPLETE BLOOD COUNT [id number]   Lymphocyte Abs           3.38 10e9/L  Unknown

 

          COMPLETE BLOOD COUNT [id number]   Monocyte Abs           0.69 10e9/L  Unknown

 

          COMPLETE BLOOD COUNT [id number]   Eosinophil Abs           0.17 10e9/L  Unknown

 

          COMPLETE BLOOD COUNT [id number]   RDW-SD              44.4 fL   

8 Unknown

 

          COMPLETE BLOOD COUNT [id number]   Basophil Abs           0.03 10e9/L  Unknown

 

          GFR CALC  4891333   GFR Non Afr Amr           53 mL/min 2018 Unk

nown

 

          GFR CALC  7436374   GFR Afr Amr           >60 mL/min 2018 Unknow

n

 

          GLYCOSYLATED HEMOGLOBIN TEST 46954     Hgb A1c   68000-3   5.4 %     0

2018 Unknown

 

          MEAN GLUC 6962617   Calc Mean Gluc           108 mg/dL 2018 Unkn

own

 

          MEAN GLUC 3074669   Calc Mean Gluc           114 mg/dL 2017 Unkn

own

 

          LIPID GROUP 54179     Cholesterol           146 mg/dL 2017 Unkno

wn

 

          LIPID GROUP 16367     Triglyceride           119 mg/dL 2017 Unkn

own

 

          LIPID GROUP 24216     HDL CHOLESTEROL           47 mg/dL  2017 U

nknown

 

          LIPID GROUP 98155     Chol/HDL Ratio           3.11 ratio 2017 U

nknown

 

          LIPID GROUP 38297     NON-HDL Chol           99 mg/dL  2017 Unkn

own

 

          LIPID GROUP 68415     LDL Cholesterol           75 mg/dL  2017 U

nknown

 

          GLYCOSYLATED HEMOGLOBIN TEST 69916     Hgb A1c   39365-2   5.6 %     0

2017 Unknown

 

          COMPREHENSIVE METABOLIC 02709     AST                 22 U/L    2017 Unknown

 

          COMPREHENSIVE METABOLIC 23642     ALT                 12 U/L    2017 Unknown

 

          COMPREHENSIVE METABOLIC 30310     BUN                 17 mg/dL  2017 Unknown

 

          COMPREHENSIVE METABOLIC 93471     ALBUMIN             4.0 g/dL  2017 Unknown

 

          COMPREHENSIVE METABOLIC 35079     CHLORIDE            110 mmol/L  Unknown

 

          COMPREHENSIVE METABOLIC 66927     Bili Total           0.4 mg/dL  Unknown

 

          COMPREHENSIVE METABOLIC 32583     ALK PHOS            63 U/L    2017 Unknown

 

          COMPREHENSIVE METABOLIC 18074     SODIUM              140 mmol/L  Unknown

 

          COMPREHENSIVE METABOLIC 08869     CREATININE           1.05 mg/dL  Unknown

 

          COMPREHENSIVE METABOLIC 99824     CALCIUM             9.2 mg/dL 2017 Unknown

 

          COMPREHENSIVE METABOLIC 47997     POTASSIUM           4.2 mmol/L  Unknown

 

          COMPREHENSIVE METABOLIC 15552     Total Protein           6.2 g/dL   Unknown

 

          COMPREHENSIVE METABOLIC 93068     Glucose             87 mg/dL  2017 Unknown

 

          COMPREHENSIVE METABOLIC 05132     Bicarbonate           24 mmol/L  Unknown

 

          COMPREHENSIVE METABOLIC 26309     AGAP                6 mmol/L  2017 Unknown

 

          GFR CALC  7704598   GFR Non Afr Amr           51 mL/min 2017 Unk

nown

 

          GFR CALC  1331087   GFR Afr Amr           >60 mL/min 2017 Unknow

n

 

          COMPLETE BLOOD COUNT [id number]   WBC                 6.7 10e9/L 20

17 Unknown

 

          COMPLETE BLOOD COUNT [id number]   RBC                 4.04 10e12/L 2017 Unknown

 

          COMPLETE BLOOD COUNT [id number]   HEMOGLOBIN           12.1 g/dL 20

17 Unknown

 

          COMPLETE BLOOD COUNT [id number]   HEMATOCRIT           38.0 %    20

17 Unknown

 

          COMPLETE BLOOD COUNT [id number]   MCV                 94.1 fL   

7 Unknown

 

          COMPLETE BLOOD COUNT [id number]   MCH                 30.0 pg   

7 Unknown

 

          COMPLETE BLOOD COUNT [id number]   MCHC                31.8 g/dL 

7 Unknown

 

          COMPLETE BLOOD COUNT [id number]   PLATELET COUNT           206 10e9/L  Unknown

 

          COMPLETE BLOOD COUNT [id number]   Mean Plt Volume           11.3 fL    Unknown

 

          COMPLETE BLOOD COUNT [id number]   Neut Auto           35.8 %    

7 Unknown

 

          COMPLETE BLOOD COUNT [id number]   Lymph Auto           51.6 %    20

17 Unknown

 

          COMPLETE BLOOD COUNT [id number]   Mono Auto           8.8 %     

7 Unknown

 

          COMPLETE BLOOD COUNT [id number]   RDW                 13.5 %    

7 Unknown

 

          COMPLETE BLOOD COUNT [id number]   Eos Auto            3.4 %     

7 Unknown

 

          COMPLETE BLOOD COUNT [id number]   Baso Auto           0.4 %     

7 Unknown

 

          COMPLETE BLOOD COUNT [id number]   Neutrophil Abs           2.40 10e9/L  Unknown

 

          COMPLETE BLOOD COUNT [id number]   Lymphocyte Abs           3.46 10e9/L  Unknown

 

          COMPLETE BLOOD COUNT [id number]   Monocyte Abs           0.59 10e9/L  Unknown

 

          COMPLETE BLOOD COUNT [id number]   Eosinophil Abs           0.23 10e9/L  Unknown

 

          COMPLETE BLOOD COUNT [id number]   RDW-SD              45.3 fL   

7 Unknown

 

          COMPLETE BLOOD COUNT [id number]   Basophil Abs           0.03 10e9/L  Unknown

 

          THYROID STIMULATING HORMONE 25970     TSH                 1.981 uIU/mL

 2017 Unknown

 

          COMPLETE BLOOD COUNT [id number]   WBC                 6.0 10e9/L 20

17 Unknown

 

          COMPLETE BLOOD COUNT [id number]   RBC                 4.29 10e12/L 2017 Unknown

 

          COMPLETE BLOOD COUNT [id number]   HEMOGLOBIN           12.9 g/dL 20

17 Unknown

 

          COMPLETE BLOOD COUNT [id number]   HEMATOCRIT           38.4 %    20

17 Unknown

 

          COMPLETE BLOOD COUNT [id number]   MCV                 89.5 fL   

7 Unknown

 

          COMPLETE BLOOD COUNT [id number]   MCH                 30.1 pg   

7 Unknown

 

          COMPLETE BLOOD COUNT [id number]   MCHC                33.6 g/dL 

7 Unknown

 

          COMPLETE BLOOD COUNT [id number]   PLATELET COUNT           181 10e9/L 2017 Unknown

 

          COMPLETE BLOOD COUNT [id number]   Mean Plt Volume           11.7 fL   2017 Unknown

 

          COMPLETE BLOOD COUNT [id number]   Neut Auto           36.9 %    

7 Unknown

 

          COMPLETE BLOOD COUNT [id number]   Lymph Auto           50.4 %    20

17 Unknown

 

          COMPLETE BLOOD COUNT [id number]   Mono Auto           9.0 %     

7 Unknown

 

          COMPLETE BLOOD COUNT [id number]   RDW                 13.7 %    

7 Unknown

 

          COMPLETE BLOOD COUNT [id number]   Eos Auto            3.4 %     

7 Unknown

 

          COMPLETE BLOOD COUNT [id number]   Baso Auto           0.3 %     

7 Unknown

 

          COMPLETE BLOOD COUNT [id number]   Neutrophil Abs           2.21 10e9/L  Unknown

 

          COMPLETE BLOOD COUNT [id number]   Lymphocyte Abs           3.02 10e9/L  Unknown

 

          COMPLETE BLOOD COUNT [id number]   Monocyte Abs           0.54 10e9/L 2017 Unknown

 

          COMPLETE BLOOD COUNT [id number]   Eosinophil Abs           0.20 10e9/L  Unknown

 

          COMPLETE BLOOD COUNT [id number]   RDW-SD              44.0 fL   

7 Unknown

 

          COMPLETE BLOOD COUNT [id number]   Basophil Abs           0.02 10e9/L 2017 Unknown

 

          GLYCOSYLATED HEMOGLOBIN TEST 21262     Hgb A1c   71507-7   5.4 %     0

3/09/2017 Unknown

 

          THYROID STIMULATING HORMONE 84921     TSH                 2.200 uIU/mL

 2017 Unknown

 

          GFR CALC  5337412   GFR Non Afr Amr           50 mL/min 2017 Unk

nown

 

          GFR CALC  6435608   GFR Afr Amr           >60 mL/min 2017 Unknow

n

 

          MEAN GLUC [id number]   Calc Mean Gluc           108 mg/dL 2017 Unkn

own

 

          COMPREHENSIVE METABOLIC 30882     AST                 18 U/L    2017 Unknown

 

          COMPREHENSIVE METABOLIC 86001     ALT                 10 U/L    2017 Unknown

 

          COMPREHENSIVE METABOLIC 36771     BUN                 20 mg/dL  2017 Unknown

 

          COMPREHENSIVE METABOLIC 74273     ALBUMIN             4.1 g/dL  2017 Unknown

 

          COMPREHENSIVE METABOLIC 72529     CHLORIDE            109 mmol/L  Unknown

 

          COMPREHENSIVE METABOLIC 38827     Bili Total           0.6 mg/dL  Unknown

 

          COMPREHENSIVE METABOLIC 13892     ALK PHOS            64 U/L    2017 Unknown

 

          COMPREHENSIVE METABOLIC 72879     SODIUM              141 mmol/L  Unknown

 

          COMPREHENSIVE METABOLIC 37505     CREATININE           1.06 mg/dL 2017 Unknown

 

          COMPREHENSIVE METABOLIC 98996     CALCIUM             9.9 mg/dL 2017 Unknown

 

          COMPREHENSIVE METABOLIC 56197     POTASSIUM           4.2 mmol/L  Unknown

 

          COMPREHENSIVE METABOLIC 77437     Total Protein           6.3 g/dL   Unknown

 

          COMPREHENSIVE METABOLIC 03113     Glucose             99 mg/dL  2017 Unknown

 

          COMPREHENSIVE METABOLIC 86547     Bicarbonate           21 mmol/L 2017 Unknown

 

          COMPREHENSIVE METABOLIC 26216     AGAP                11 mmol/L 2017 Unknown

 

          LIPID GROUP [id number]     Cholesterol           169 mg/dL 2016 Unkno

wn

 

          LIPID GROUP 28596     Triglyceride           165 mg/dL 2016 Unkn

own

 

          LIPID GROUP 89801     HDL CHOLESTEROL           43 mg/dL  2016 U

nknown

 

          LIPID GROUP 18746     Chol/HDL Ratio           3.93 ratio 2016 U

nknown

 

          LIPID GROUP 58345     NON-HDL Chol           126 mg/dL 2016 Unkn

own

 

          LIPID GROUP 99637     LDL Cholesterol           93 mg/dL  2016 U

nknown

 

          COMPREHENSIVE METABOLIC 72351     AST                 18 U/L    2016 Unknown

 

          COMPREHENSIVE METABOLIC 60265     ALT                 10 U/L    2016 Unknown

 

          COMPREHENSIVE METABOLIC 57789     BUN                 20 mg/dL  2016 Unknown

 

          COMPREHENSIVE METABOLIC 79158     ALBUMIN             3.9 g/dL  2016 Unknown

 

          COMPREHENSIVE METABOLIC 92094     CHLORIDE            110 mmol/L  Unknown

 

          COMPREHENSIVE METABOLIC 48996     Bili Total           0.5 mg/dL  Unknown

 

          COMPREHENSIVE METABOLIC 13085     ALK PHOS            72 U/L    2016 Unknown

 

          COMPREHENSIVE METABOLIC 92548     SODIUM              141 mmol/L  Unknown

 

          COMPREHENSIVE METABOLIC 74404     CREATININE           1.12 mg/dL  Unknown

 

          COMPREHENSIVE METABOLIC 89551     CALCIUM             9.7 mg/dL 2016 Unknown

 

          COMPREHENSIVE METABOLIC 56682     POTASSIUM           4.4 mmol/L  Unknown

 

          COMPREHENSIVE METABOLIC 49572     Total Protein           6.2 g/dL   Unknown

 

          COMPREHENSIVE METABOLIC 89963     Glucose             90 mg/dL  2016 Unknown

 

          COMPREHENSIVE METABOLIC 06299     Bicarbonate           23 mmol/L  Unknown

 

          COMPREHENSIVE METABOLIC 97775     AGAP                8 mmol/L  2016 Unknown

 

          GFR CALC  1402376   GFR Non Afr Amr           47 mL/min 2016 Unk

nown

 

          GFR CALC  8399810   GFR Afr Amr           57 mL/min 2016 Unknown

 

          GLYCOSYLATED HEMOGLOBIN TEST 12374     Hgb A1c   54982-3   5.5 %     0

2016 Unknown

 

          THYROID STIMULATING HORMONE 96625     TSH                 2.537 uIU/mL

 2016 Unknown

 

          FREE T4   36670     T4 Free             1.36 ng/dL 2016 Unknown

 

          COMPLETE BLOOD COUNT 8477792   WBC                 6.8 10e9/L 20

16 Unknown

 

          COMPLETE BLOOD COUNT 5185419   RBC                 4.20 10e12/L 2016 Unknown

 

          COMPLETE BLOOD COUNT 9451750   HEMOGLOBIN           12.5 g/dL 20

16 Unknown

 

          COMPLETE BLOOD COUNT 1099573   HEMATOCRIT           38.0 %    20

16 Unknown

 

          COMPLETE BLOOD COUNT 3705777   MCV                 90.5 fL   

6 Unknown

 

          COMPLETE BLOOD COUNT 0174134   MCH                 29.8 pg   

6 Unknown

 

          COMPLETE BLOOD COUNT [id number]   MCHC                32.9 g/dL 

6 Unknown

 

          COMPLETE BLOOD COUNT 7728059   PLATELET COUNT           197 10e9/L  Unknown

 

          COMPLETE BLOOD COUNT 7388225   Mean Plt Volume           11.7 fL    Unknown

 

          COMPLETE BLOOD COUNT [id number]   Neut Auto           41.3 %    

6 Unknown

 

          COMPLETE BLOOD COUNT 7633815   Lymph Auto           47.1 %    20

16 Unknown

 

          COMPLETE BLOOD COUNT 9657316   Mono Auto           7.8 %     

6 Unknown

 

          COMPLETE BLOOD COUNT [id number]   RDW                 13.8 %    

6 Unknown

 

          COMPLETE BLOOD COUNT [id number]   Eos Auto            3.4 %     

6 Unknown

 

          COMPLETE BLOOD COUNT 6183615   Baso Auto           0.4 %     

6 Unknown

 

          COMPLETE BLOOD COUNT 9842789   Neutrophil Abs           2.81 10e9/L  Unknown

 

          COMPLETE BLOOD COUNT [id number]   Lymphocyte Abs           3.20 10e9/L  Unknown

 

          COMPLETE BLOOD COUNT 3670154   Monocyte Abs           0.53 10e9/L  Unknown

 

          COMPLETE BLOOD COUNT 8804410   Eosinophil Abs           0.23 10e9/L  Unknown

 

          COMPLETE BLOOD COUNT 5270447   RDW-SD              44.4 fL   

6 Unknown

 

          COMPLETE BLOOD COUNT 3650341   Basophil Abs           0.03 10e9/L  Unknown

 

          MEAN GLUC 0578798   Calc Mean Gluc           111 mg/dL 2016 Unkn

own

 

          [place] [address]     Glucose             89 MG/DL   Unknown

 

          [place] [address]     CREATININE           1.12 MG/DL  Unknown

 

          [place] [address]     BUN                 20 MG/DL   Unknown

 

          [place] [address]     SODIUM              139 MMOL/L  Unknown

 

          [place] [address]     POTASSIUM           4.6 MMOL/L  Unknown

 

          [place] [address]     CHLORIDE            108 MMOL/L  Unknown

 

          [place] [address]     BICARB              26 MMOL/L  Unknown

 

          [place] [address]     ANION GAP           5 MEQ/L    Unknown

 

          [place] [address]     CALCIUM             10.0 MG/DL  Unknown

 

          GFR CALC  5159453   GFR AA              57.0L ML/MIN 2015 Unknow

n

 

          GFR CALC  8902047   GFR NON-AA           47.0L ML/MIN 2015 Unkno

wn

 

          THYROID STIMULATING HORMONE 04931     TSH                 2.378 uIU/ML

 09/10/2015 Unknown

 

          COMPLETE BLOOD COUNT [id number]   WBC                 6.4 10e9/L 09/10/20

15 Unknown

 

          COMPLETE BLOOD COUNT 0349998   RBC                 3.99 10e12/L 09/10/

2015 Unknown

 

          COMPLETE BLOOD COUNT 1384477   HGB                 11.9 g/dL 09/10/201

5 Unknown

 

          COMPLETE BLOOD COUNT [id number]   HCT DET             36.9 %    09/10/201

5 Unknown

 

          COMPLETE BLOOD COUNT [id number]   MCV                 92.5 fL   09/10/201

5 Unknown

 

          COMPLETE BLOOD COUNT 7231403   MCH                 29.8 pg   09/10/201

5 Unknown

 

          COMPLETE BLOOD COUNT [id number]   MCHC                32.2 g/dL 09/10/201

5 Unknown

 

          COMPLETE BLOOD COUNT [id number]   PLT                 172 10e9/L 09/10/20

15 Unknown

 

          COMPLETE BLOOD COUNT 5957902   MPV                 11.7 fL   09/10/201

5 Unknown

 

          COMPLETE BLOOD COUNT [id number]   ARIK %               40.4 %    09/10/201

5 Unknown

 

          COMPLETE BLOOD COUNT [id number]   LY %                48.0 %    09/10/201

5 Unknown

 

          COMPLETE BLOOD COUNT 1461590   MON %               8.3 %     09/10/201

5 Unknown

 

          COMPLETE BLOOD COUNT [id number]   EOS  %              2.8 %     09/10/201

5 Unknown

 

          COMPLETE BLOOD COUNT [id number]   BASO %              0.5 %     09/10/201

5 Unknown

 

          COMPLETE BLOOD COUNT [id number]   RDW                 13.6 %    09/10/201

5 Unknown

 

          COMPLETE BLOOD COUNT 8167077   ABS ARIK             2.59 10e9/L 09/10/2

015 Unknown

 

          COMPLETE BLOOD COUNT [id number]   ABS LYMPH           3.07 10e9/L 09/10/2

015 Unknown

 

          COMPLETE BLOOD COUNT [id number]   ABS MONO            0.53 10e9/L 09/10/2

015 Unknown

 

          COMPLETE BLOOD COUNT [id number]   ABS EOS             0.18 10e9/L 09/10/2

015 Unknown

 

          COMPLETE BLOOD COUNT [id number]   ABS BASO            0.03 10e9/L 09/10/2

015 Unknown

 

          COMPLETE BLOOD COUNT [id number]   RDW-SD              44.9 fL   09/10/201

5 Unknown

 

          LIPID GROUP [id number]     HDL TEST            42 MG/DL  09/10/2015 Unknown

 

          LIPID GROUP [id number]     TRIG                177 MG/DL 09/10/2015 Unknown

 

          LIPID GROUP 13869     TEST LDL            72 MG/DL  09/10/2015 Unknown

 

          LIPID GROUP 42183     CHOL                149 MG/DL 09/10/2015 Unknown

 

          LIPID GROUP 80893     RCHOL/HDL           3.55 RATIO 09/10/2015 Unknow

n

 

          LIPID GROUP 99082     NON-HDL CH           107 MG/DL 09/10/2015 Unknow

n

 

          GLYCOSYLATED HEMOGLOBIN TEST 08562     A1C HPLC  96431-2   5.5 %     0

9/10/2015 Unknown

 

          FREE T4   78237     FREE T4             1.39 NG/DL 09/10/2015 Unknown

 

          GFR CALC  3252104   GFR AA              55.0L ML/MIN 09/10/2015 Unknow

n

 

          GFR CALC  4731521   GFR NON-AA           46.0L ML/MIN 09/10/2015 Unkno

wn

 

          COMPREHENSIVE METABOLIC 74118     AST                 17 U/L    09/10/

2015 Unknown

 

          COMPREHENSIVE METABOLIC 78082     ALT                 10 IU/L   09/10/

2015 Unknown

 

          COMPREHENSIVE METABOLIC 25543     BUN                 20 MG/DL  09/10/

2015 Unknown

 

          COMPREHENSIVE METABOLIC 33206     ALBUMIN             3.9 GM/DL 09/10/

2015 Unknown

 

          COMPREHENSIVE METABOLIC 78041     CHLORIDE            111 MMOL/L 09/10

/2015 Unknown

 

          COMPREHENSIVE METABOLIC 18691     BILI TOT            0.4 MG/DL 09/10/

2015 Unknown

 

          COMPREHENSIVE METABOLIC 64881     ALK PHOS            70 U/L    09/10/

2015 Unknown

 

          COMPREHENSIVE METABOLIC 63726     SODIUM              142 MMOL/L 09/10

/2015 Unknown

 

          COMPREHENSIVE METABOLIC 99118     CREATININE           1.16 MG/DL  Unknown

 

          COMPREHENSIVE METABOLIC 39475     CALCIUM             9.4 MG/DL 09/10/

2015 Unknown

 

          COMPREHENSIVE METABOLIC 57781     POTASSIUM           4.6 MMOL/L 09/10

/2015 Unknown

 

          COMPREHENSIVE METABOLIC 15451     PROT TOT            6.2 GM/DL 09/10/

2015 Unknown

 

          COMPREHENSIVE METABOLIC 17411     Glucose             90 MG/DL  09/10/

2015 Unknown

 

          COMPREHENSIVE METABOLIC 62757     BICARB              24 MMOL/L 09/10/

2015 Unknown

 

          COMPREHENSIVE METABOLIC 96765     ANION GAP           7 MEQ/L   09/10/

2015 Unknown

 

          THYROID STIMULATING HORMONE 99334     TSH                 2.427 uIU/ML

 2015 Unknown

 

          LIPID GROUP 33754     HDL TEST            47 MG/DL  2015 Unknown

 

          LIPID GROUP 46283     TRIG                145 MG/DL 2015 Unknown

 

          LIPID GROUP 84845     TEST LDL            73 MG/DL  2015 Unknown

 

          LIPID GROUP 03082     CHOL                149 MG/DL 2015 Unknown

 

          LIPID GROUP 29977     RCHOL/HDL           3.17 RATIO 2015 Unknow

n

 

          LIPID GROUP 22352     NON-HDL CH           102 MG/DL 2015 Unknow

n

 

          COMPREHENSIVE METABOLIC 07377     AST                 17 U/L    2015 Unknown

 

          COMPREHENSIVE METABOLIC 42159     ALT                 9 IU/L    2015 Unknown

 

          COMPREHENSIVE METABOLIC 60689     BUN                 19 MG/DL  2015 Unknown

 

          COMPREHENSIVE METABOLIC 11235     ALBUMIN             4.3 GM/DL 2015 Unknown

 

          COMPREHENSIVE METABOLIC 17422     CHLORIDE            108 MMOL/L  Unknown

 

          COMPREHENSIVE METABOLIC 85877     BILI TOT            0.5 MG/DL 2015 Unknown

 

          COMPREHENSIVE METABOLIC 30271     ALK PHOS            68 U/L    2015 Unknown

 

          COMPREHENSIVE METABOLIC 18220     SODIUM              140 MMOL/L  Unknown

 

          COMPREHENSIVE METABOLIC 68321     CREATININE           1.08 MG/DL 2015 Unknown

 

          COMPREHENSIVE METABOLIC 20357     CALCIUM             9.9 MG/DL 2015 Unknown

 

          COMPREHENSIVE METABOLIC 37265     POTASSIUM           4.3 MMOL/L  Unknown

 

          COMPREHENSIVE METABOLIC 11279     PROT TOT            7.2 GM/DL 2015 Unknown

 

          COMPREHENSIVE METABOLIC 46422     Glucose             94 MG/DL  2015 Unknown

 

          COMPREHENSIVE METABOLIC 51988     BICARB              26 MMOL/L 2015 Unknown

 

          COMPREHENSIVE METABOLIC 23733     ANION GAP           6 MEQ/L   2015 Unknown

 

          GFR CALC  6207220   GFR AA              60.0L ML/MIN 2015 Unknow

n

 

          GFR CALC  9744244   GFR NON-AA           49.0L ML/MIN 2015 Unkno

wn

 

          GLYCOSYLATED HEMOGLOBIN TEST 79292     A1C HPLC  42839-3   5.6 %     0

3/06/2015 Unknown

 

          COMPLETE BLOOD COUNT 5688130   WBC                 7.2 10e9/L 20

15 Unknown

 

          COMPLETE BLOOD COUNT [id number]   RBC                 4.28 10e12/L 2015 Unknown

 

          COMPLETE BLOOD COUNT 1782339   HGB                 12.8 g/dL 

5 Unknown

 

          COMPLETE BLOOD COUNT 3674271   HCT DET             39.3 %    

5 Unknown

 

          COMPLETE BLOOD COUNT 1454984   MCV                 91.8 fL   

5 Unknown

 

          COMPLETE BLOOD COUNT 3626623   MCH                 29.9 pg   

5 Unknown

 

          COMPLETE BLOOD COUNT 8890206   MCHC                32.6 g/dL 

5 Unknown

 

          COMPLETE BLOOD COUNT [id number]   PLT                 189 10e9/L 20

15 Unknown

 

          COMPLETE BLOOD COUNT [id number]   MPV                 11.2 fL   

5 Unknown

 

          COMPLETE BLOOD COUNT 5626812   ARIK %               38.0 %    

5 Unknown

 

          COMPLETE BLOOD COUNT 0608433   LY %                51.0 %    

5 Unknown

 

          COMPLETE BLOOD COUNT 5642312   MON %               7.7 %     

5 Unknown

 

          COMPLETE BLOOD COUNT 7043186   EOS  %              2.9 %     

5 Unknown

 

          COMPLETE BLOOD COUNT 7384023   BASO %              0.4 %     

5 Unknown

 

          COMPLETE BLOOD COUNT 3907596   RDW                 14.0 %    

5 Unknown

 

          COMPLETE BLOOD COUNT [id number]   ABS ARIK             2.74 10e9/L 

015 Unknown

 

          COMPLETE BLOOD COUNT 1390843   ABS LYMPH           3.67 10e9/L 

015 Unknown

 

          COMPLETE BLOOD COUNT 7031316   ABS MONO            0.55 10e9/L 

015 Unknown

 

          COMPLETE BLOOD COUNT [id number]   ABS EOS             0.21 10e9/L 

015 Unknown

 

          COMPLETE BLOOD COUNT [id number]   ABS BASO            0.03 10e9/L 

015 Unknown

 

          COMPLETE BLOOD COUNT [id number]   RDW-SD              46.1 fL   

5 Unknown

 

          FREE T4   98394     FREE T4             1.14 NG/DL 2015 Unknown

 

          GLYCOSYLATED HEMOGLOBIN TEST 34632     A1C HPLC  95704-3   5.2 %     0

2014 Unknown

 

          FREE T4   70680     FREE T4             1.40 NG/DL 2014 Unknown

 

          GFR CALC  1792316   GFR AA              >60 ML/MIN 2014 Unknown

 

          GFR CALC  7739204   GFR NON-AA           52.0L ML/MIN 2014 Unkno

wn

 

          COMPREHENSIVE METABOLIC 72249     AST                 15 U/L    2014 Unknown

 

          COMPREHENSIVE METABOLIC 33417     ALT                 9 IU/L    2014 Unknown

 

          COMPREHENSIVE METABOLIC 27402     BUN                 17 MG/DL  2014 Unknown

 

          COMPREHENSIVE METABOLIC 41214     ALBUMIN             4.0 GM/DL 2014 Unknown

 

          COMPREHENSIVE METABOLIC 75604     CHLORIDE            112 MMOL/L  Unknown

 

          COMPREHENSIVE METABOLIC 22031     BILI TOT            0.5 MG/DL 2014 Unknown

 

          COMPREHENSIVE METABOLIC 30994     ALK PHOS            66 U/L    2014 Unknown

 

          COMPREHENSIVE METABOLIC 19355     SODIUM              140 MMOL/L  Unknown

 

          COMPREHENSIVE METABOLIC 70846     CREATININE           1.03 MG/DL  Unknown

 

          COMPREHENSIVE METABOLIC 87680     CALCIUM             9.5 MG/DL 2014 Unknown

 

          COMPREHENSIVE METABOLIC 35265     POTASSIUM           4.1 MMOL/L  Unknown

 

          COMPREHENSIVE METABOLIC 85133     PROT TOT            6.2 GM/DL 2014 Unknown

 

          COMPREHENSIVE METABOLIC 49345     Glucose             102 MG/DL 2014 Unknown

 

          COMPREHENSIVE METABOLIC 61364     BICARB              23 MMOL/L 2014 Unknown

 

          COMPREHENSIVE METABOLIC 05263     ANION GAP           5 MEQ/L   2014 Unknown

 

          THYROID STIMULATING HORMONE 34119     TSH                 2.074 uIU/ML

 2014 Unknown

 

          VITAMIN B 12 FOLIC ACID [id number] VIT B 12            423 PG/ML  Unknown

 

          VITAMIN B 12 FOLIC ACID [id number]|[id number] FOLIC ACID           19.7 NG/ML  Unknown

 

          LIPID GROUP 48027     HDL TEST            40 MG/DL  2014 Unknown

 

          LIPID GROUP 08556     TRIG                145 MG/DL 2014 Unknown

 

          LIPID GROUP 73583     TEST LDL            81 MG/DL  2014 Unknown

 

          LIPID GROUP 30031     CHOL                150 MG/DL 2014 Unknown

 

          LIPID GROUP 10619     RCHOL/HDL           3.75 RATIO 2014 Unknow

n

 

          COMPLETE BLOOD COUNT 0349311   WBC                 6.0 10e9/L 20

14 Unknown

 

          COMPLETE BLOOD COUNT 0486467   RBC                 4.26 10e12/L 2014 Unknown

 

          COMPLETE BLOOD COUNT 3936809   HGB                 12.7 g/dL 

4 Unknown

 

          COMPLETE BLOOD COUNT 8995477   HCT DET             38.7 %    

4 Unknown

 

          COMPLETE BLOOD COUNT 9201560   MCV                 90.8 fL   

4 Unknown

 

          COMPLETE BLOOD COUNT 2397830   MCH                 29.8 pg   

4 Unknown

 

          COMPLETE BLOOD COUNT 4275455   MCHC                32.8 g/dL 

4 Unknown

 

          COMPLETE BLOOD COUNT 7238911   PLT                 178 10e9/L 20

14 Unknown

 

          COMPLETE BLOOD COUNT [id number]   MPV                 11.7 fL   

4 Unknown

 

          COMPLETE BLOOD COUNT [id number]   ARIK %               30.5 %    

4 Unknown

 

          COMPLETE BLOOD COUNT 2827443   LY %                55.4 %    

4 Unknown

 

          COMPLETE BLOOD COUNT 5971254   MON %               9.0 %     

4 Unknown

 

          COMPLETE BLOOD COUNT [id number]   EOS  %              4.4 %     

4 Unknown

 

          COMPLETE BLOOD COUNT 6937844   BASO %              0.7 %     

4 Unknown

 

          COMPLETE BLOOD COUNT [id number]   RDW                 13.3 %    

4 Unknown

 

          COMPLETE BLOOD COUNT [id number]   ABS ARIK             1.83 10e9/L 

014 Unknown

 

          COMPLETE BLOOD COUNT [id number]   ABS LYMPH           3.32 10e9/L 

014 Unknown

 

          COMPLETE BLOOD COUNT [id number]   ABS MONO            0.54 10e9/L 

014 Unknown

 

          COMPLETE BLOOD COUNT [id number]   ABS EOS             0.26 10e9/L 

014 Unknown

 

          COMPLETE BLOOD COUNT 8569279   ABS BASO            0.04 10e9/L 

014 Unknown

 

          COMPLETE BLOOD COUNT [id number]   RDW-SD              43.2 fL   

4 Unknown

 

          HEMOGLOBIN A1C (GLYCOSYLATED) 7179929   A1C HPLC  51512-2   5.5 %     

2012 Unknown

 

          COMPLETE BLOOD COUNT [id number]   WBC                 6.0 10e9/L 20

12 Unknown

 

          COMPLETE BLOOD COUNT 9802882   RBC                 4.22 10e12/L 2012 Unknown

 

          COMPLETE BLOOD COUNT 8536653   HGB                 12.4 g/dL 

2 Unknown

 

          COMPLETE BLOOD COUNT 8988914   HCT DET             38.2 %    

2 Unknown

 

          COMPLETE BLOOD COUNT 7018312   MCV                 90.5 fL   

2 Unknown

 

          COMPLETE BLOOD COUNT [id number]   MCH                 29.4 pg   

2 Unknown

 

          COMPLETE BLOOD COUNT [id number]   MCHC                32.5 g/dL 

2 Unknown

 

          COMPLETE BLOOD COUNT [id number]   PLT                 187 10e9/L 20

12 Unknown

 

          COMPLETE BLOOD COUNT [id number]   MPV                 11.5 fL   

2 Unknown

 

          COMPLETE BLOOD COUNT [id number]   ARIK %               36.4 %    

2 Unknown

 

          COMPLETE BLOOD COUNT [id number]   LY %                51.0 %    

2 Unknown

 

          COMPLETE BLOOD COUNT [id number]   MON %               8.7 %     

2 Unknown

 

          COMPLETE BLOOD COUNT [id number]   EOS  %              3.2 %     

2 Unknown

 

          COMPLETE BLOOD COUNT [id number]   BASO %              0.7 %     

2 Unknown

 

          COMPLETE BLOOD COUNT [id number]   RDW                 13.7 %    

2 Unknown

 

          COMPLETE BLOOD COUNT [id number]   ABS ARIK             2.18 10e9/L 

012 Unknown

 

          COMPLETE BLOOD COUNT [id number]   ABS LYMPH           3.06 10e9/L 

012 Unknown

 

          COMPLETE BLOOD COUNT [id number]   ABS MONO            0.52 10e9/L 

012 Unknown

 

          COMPLETE BLOOD COUNT [id number]   ABS EOS             0.19 10e9/L 

012 Unknown

 

          COMPLETE BLOOD COUNT [id number]   ABS BASO            0.04 10e9/L 

012 Unknown

 

          COMPLETE BLOOD COUNT [id number]   RDW-SD              44.3 fL   

2 Unknown

 

          LIPID GROUP [id number]     HDL TEST            42 MG/DL  2012 Unknown

 

          LIPID GROUP 17621     TRIG                156 MG/DL 2012 Unknown

 

          LIPID GROUP 85634     TEST LDL            80 MG/DL  2012 Unknown

 

          LIPID GROUP 09360     CHOL                153 MG/DL 2012 Unknown

 

          LIPID GROUP 19407     RCHOL/HDL           3.64 RATIO 2012 Unknow

n

 

          FREE T4   48386     FREE T4             1.22 NG/DL 2012 Unknown

 

          COMPREHENSIVE METABOLIC 07730     AST                 20 U/L    2012 Unknown

 

          COMPREHENSIVE METABOLIC 99452     ALT                 11 IU/L   2012 Unknown

 

          COMPREHENSIVE METABOLIC 86508     BUN                 19 MG/DL  2012 Unknown

 

          COMPREHENSIVE METABOLIC 53330     ALBUMIN             4.3 GM/DL 2012 Unknown

 

          COMPREHENSIVE METABOLIC 05619     CHLORIDE            109 MMOL/L  Unknown

 

          COMPREHENSIVE METABOLIC 08054     BILI TOT            0.6 MG/DL 2012 Unknown

 

          COMPREHENSIVE METABOLIC 98346     ALK PHOS            84 U/L    2012 Unknown

 

          COMPREHENSIVE METABOLIC 50854     SODIUM              142 MMOL/L  Unknown

 

          COMPREHENSIVE METABOLIC 72783     CREATININE           1.09 MG/DL  Unknown

 

          COMPREHENSIVE METABOLIC 91326     CALCIUM             9.8 MG/DL 2012 Unknown

 

          COMPREHENSIVE METABOLIC 93200     POTASSIUM           4.2 MMOL/L  Unknown

 

          COMPREHENSIVE METABOLIC 74693     PROT TOT            6.4 GM/DL 2012 Unknown

 

          COMPREHENSIVE METABOLIC 10332     Glucose             89 MG/DL  2012 Unknown

 

          COMPREHENSIVE METABOLIC 24730     BICARB              25 MMOL/L 2012 Unknown

 

          COMPREHENSIVE METABOLIC 39080     ANION GAP           8 MEQ/L   2012 Unknown

 

          GFR CALC  1682085   GFR AA              60.0L ML/MIN 2012 Unknow

n

 

          GFR CALC  2993077   GFR NON-AA           49.0L ML/MIN 2012 Unkno

wn

 

          THYROID STIMULATING HORMONE 35624     TSH                 2.450 uIU/ML

 2012 Unknown

 

          COMPREHENSIVE METABOLIC 90059     AST                 22 U/L    2012 Unknown

 

          COMPREHENSIVE METABOLIC 28957     ALT                 14 IU/L   2012 Unknown

 

          COMPREHENSIVE METABOLIC 49377     BUN                 21 MG/DL  2012 Unknown

 

          COMPREHENSIVE METABOLIC 84247     ALBUMIN             4.3 GM/DL 2012 Unknown

 

          COMPREHENSIVE METABOLIC 37993     CHLORIDE            106 MMOL/L  Unknown

 

          COMPREHENSIVE METABOLIC 30523     BILI TOT            0.4 MG/DL 2012 Unknown

 

          COMPREHENSIVE METABOLIC 37810     ALK PHOS            80 U/L    2012 Unknown

 

          COMPREHENSIVE METABOLIC 46908     SODIUM              141 MMOL/L  Unknown

 

          COMPREHENSIVE METABOLIC 30959     CREATININE           1.13 MG/DL  Unknown

 

          COMPREHENSIVE METABOLIC 94152     CALCIUM             9.4 MG/DL 2012 Unknown

 

          COMPREHENSIVE METABOLIC 53103     POTASSIUM           4.3 MMOL/L  Unknown

 

          COMPREHENSIVE METABOLIC 93147     PROT TOT            6.7 GM/DL 2012 Unknown

 

          COMPREHENSIVE METABOLIC 83899     Glucose             98 MG/DL  2012 Unknown

 

          COMPREHENSIVE METABOLIC 71529     BICARB              25 MMOL/L 2012 Unknown

 

          COMPREHENSIVE METABOLIC 90377     ANION GAP           10 MEQ/L  2012 Unknown

 

          LIPID GROUP 46919     HDL TEST            44 MG/DL  2012 Unknown

 

          LIPID GROUP [id number]     TRIG                164 MG/DL 2012 Unknown

 

          LIPID GROUP 16607     TEST LDL            98 MG/DL  2012 Unknown

 

          LIPID GROUP 57839     CHOL                175 MG/DL 2012 Unknown

 

          LIPID GROUP [id number]     RCHOL/HDL           3.98 RATIO 2012 Unknow

n

 

          COMPLETE BLOOD COUNT 76720     WBC                 6.7 10e9/L 20

12 Unknown

 

          COMPLETE BLOOD COUNT 45250     RBC                 4.36 10e12/L 2012 Unknown

 

          COMPLETE BLOOD COUNT 52679     HGB                 12.9 g/dL 

2 Unknown

 

          COMPLETE BLOOD COUNT 05340     HCT DET             39.4 %    

2 Unknown

 

          COMPLETE BLOOD COUNT 80234     MCV                 90.4 fL   

2 Unknown

 

          COMPLETE BLOOD COUNT 53339     MCH                 29.6 pg   

2 Unknown

 

          COMPLETE BLOOD COUNT 41217     MCHC                32.7 g/dL 

2 Unknown

 

          COMPLETE BLOOD COUNT 68673     PLT                 184 10e9/L 20

12 Unknown

 

          COMPLETE BLOOD COUNT 27577     MPV                 10.9 fL   

2 Unknown

 

          COMPLETE BLOOD COUNT 71366     ARIK %               41.5 %    

2 Unknown

 

          COMPLETE BLOOD COUNT 54258     LY %                45.7 %    

2 Unknown

 

          COMPLETE BLOOD COUNT 82101     MON %               9.4 %     

2 Unknown

 

          COMPLETE BLOOD COUNT 45973     EOS  %              3.0 %     

2 Unknown

 

          COMPLETE BLOOD COUNT 34885     BASO %              0.4 %     

2 Unknown

 

          COMPLETE BLOOD COUNT 88088     RDW                 13.2 %    

2 Unknown

 

          COMPLETE BLOOD COUNT 66076     ABS ARIK             2.78 10e9/L 

012 Unknown

 

          COMPLETE BLOOD COUNT 06366     ABS LYMPH           3.06 10e9/L 

012 Unknown

 

          COMPLETE BLOOD COUNT 94513     ABS MONO            0.63 10e9/L 

012 Unknown

 

          COMPLETE BLOOD COUNT 02951     ABS EOS             0.20 10e9/L 

012 Unknown

 

          COMPLETE BLOOD COUNT 67424     ABS BASO            0.03 10e9/L 

012 Unknown

 

          COMPLETE BLOOD COUNT 46640     RDW-SD              42.3 fL   

2 Unknown

 

          GFR CALC  4398938   GFR AA              57.0L ML/MIN 2012 Unknow

n

 

          GFR CALC  6863197   GFR NON-AA           47.0L ML/MIN 2012 Unkno

wn

 

          THYROID STIMULATING HORMONE 94852     TSH                 2.663 uIU/ML

 2012 Unknown

 

          FREE T4   29501     FREE T4             1.15 NG/DL 2012 Unknown

 

          THYROID STIMULATING HORMONE 15522     TSH                 1.908 uIU/ML

 2011 Unknown

 

          COMPLETE BLOOD COUNT 52657     WBC                 6.4 10e9/L 20

11 Unknown

 

          COMPLETE BLOOD COUNT 72102     RBC                 3.92 10e12/L 2011 Unknown

 

          COMPLETE BLOOD COUNT 79955     HGB                 11.8 g/dL 

1 Unknown

 

          COMPLETE BLOOD COUNT 75361     HCT DET             36.0 %    

1 Unknown

 

          COMPLETE BLOOD COUNT 60318     MCV                 91.8 fL   

1 Unknown

 

          COMPLETE BLOOD COUNT 06044     MCH                 30.1 pg   

1 Unknown

 

          COMPLETE BLOOD COUNT 62081     MCHC                32.8 g/dL 

1 Unknown

 

          COMPLETE BLOOD COUNT 13371     PLT                 176 10e9/L 20

11 Unknown

 

          COMPLETE BLOOD COUNT 76793     MPV                 11.4 fL   

1 Unknown

 

          COMPLETE BLOOD COUNT 32910     ARIK %               50.4 %    

1 Unknown

 

          COMPLETE BLOOD COUNT 18867     LY %                35.5 %    

1 Unknown

 

          COMPLETE BLOOD COUNT 35557     MON %               10.2 %    

1 Unknown

 

          COMPLETE BLOOD COUNT 02615     EOS  %              3.3 %     

1 Unknown

 

          COMPLETE BLOOD COUNT 62428     BASO %              0.6 %     

1 Unknown

 

          COMPLETE BLOOD COUNT 61779     RDW                 13.7 %    

1 Unknown

 

          COMPLETE BLOOD COUNT 04581     ABS ARIK             3.23 10e9/L 

011 Unknown

 

          COMPLETE BLOOD COUNT 44232     ABS LYMPH           2.27 10e9/L 

011 Unknown

 

          COMPLETE BLOOD COUNT 96186     ABS MONO            0.65 10e9/L 

011 Unknown

 

          COMPLETE BLOOD COUNT 26722     ABS EOS             0.21 10e9/L 

011 Unknown

 

          COMPLETE BLOOD COUNT 16183     ABS BASO            0.04 10e9/L 

011 Unknown

 

          COMPLETE BLOOD COUNT 76665     RDW-SD              45.3 fL   

1 Unknown

 

          GFR CALC  4805844   GFR AA              >60 ML/MIN 2011 Unknown

 

          GFR CALC  2422965   GFR NON-AA           53.0L ML/MIN 2011 Unkno

wn

 

          FREE T4   94070     FREE T4             1.20 NG/DL 2011 Unknown

 

          COMPREHENSIVE METABOLIC 29161     AST                 17 U/L    2011 Unknown

 

          COMPREHENSIVE METABOLIC 55824     ALT                 9 IU/L    2011 Unknown

 

          COMPREHENSIVE METABOLIC 71839     BUN                 16 MG/DL  2011 Unknown

 

          COMPREHENSIVE METABOLIC 42897     ALBUMIN             4.0 GM/DL 2011 Unknown

 

          COMPREHENSIVE METABOLIC 28238     CHLORIDE            108 MMOL/L  Unknown

 

          COMPREHENSIVE METABOLIC 63247     BILI TOT            0.5 MG/DL 2011 Unknown

 

          COMPREHENSIVE METABOLIC 43148     ALK PHOS            76 U/L    2011 Unknown

 

          COMPREHENSIVE METABOLIC 59373     SODIUM              139 MMOL/L  Unknown

 

          COMPREHENSIVE METABOLIC 11260     CREATININE           1.02 MG/DL 2011 Unknown

 

          COMPREHENSIVE METABOLIC 03541     CALCIUM             9.2 MG/DL 2011 Unknown

 

          COMPREHENSIVE METABOLIC 68071     POTASSIUM           4.5 MMOL/L  Unknown

 

          COMPREHENSIVE METABOLIC 26098     PROT TOT            6.1 GM/DL 2011 Unknown

 

          COMPREHENSIVE METABOLIC 19463     Glucose             93 MG/DL  2011 Unknown

 

          COMPREHENSIVE METABOLIC 54995     BICARB              26 MMOL/L 2011 Unknown

 

          COMPREHENSIVE METABOLIC 35345     ANION GAP           5 MEQ/L   2011 Unknown

 

          LIPID GROUP 39495     HDL TEST            46 MG/DL  2011 Unknown

 

          LIPID GROUP 91425     TRIG                102 MG/DL 2011 Unknown

 

          LIPID GROUP 98043     TEST LDL            88 MG/DL  2011 Unknown

 

          LIPID GROUP 16248     CHOL                154 MG/DL 2011 Unknown

 

          LIPID GROUP 93848     RCHOL/HDL           3.35 RATIO 2011 Unknow

n







Procedures





                    Procedure           Codes               Date

 

                    ROUTINE VENIPUNCTURE CPT-4: 69352        2019

 

                    LIPID PANEL         CPT-4: 16548        2019

 

                    FLU VACC PRSV FREE INC ANTIG 65 AND OLDER CPT-4: 66450      

  10/22/2019

 

                    FLU VACC PRSV FREE INC ANTIG 65 AND OLDER CPT-4: 97489      

  10/22/2019

 

                    ADMIN INFLUENZA VIRUS VAC CPT-4:         10/22/2019

 

                    COMPREHEN METABOLIC PANEL CPT-4: 49308        10/11/2019

 

                    ROUTINE VENIPUNCTURE CPT-4: 65222        10/11/2019

 

                    ROUTINE VENIPUNCTURE CPT-4: 92765        06/10/2019

 

                    ASSAY THYROID STIM HORMONE CPT-4: 38774        06/10/2019

 

                    COMPREHEN METABOLIC PANEL CPT-4: 05937        06/10/2019

 

                    COMPLETE CBC W/AUTO DIFF WBC CPT-4: 80792        06/10/2019

 

                    URINALYSIS NONAUTO W/O SCOPE CPT-4: 63259        2019

 

                    URINE CULTURE/ COLONY COUNT CPT-4: 32771        2019

 

                    URINE CULTURE/ COLONY COUNT CPT-4: 79328        10/02/2018

 

                    ROUTINE VENIPUNCTURE CPT-4: 49602        2018

 

                    ASSAY OF FREE THYROXINE CPT-4: 50191        2018

 

                    ASSAY THYROID STIM HORMONE CPT-4: 10494        2018

 

                    COMPLETE CBC W/AUTO DIFF WBC CPT-4: 49204        2018

 

                    METABOLIC PANEL TOTAL CA CPT-4: 58410        2018

 

                    FLU VACC PRSV FREE INC ANTIG 65 AND OLDER CPT-4: 37954      

  2018

 

                    ASSAY, GLUCOSE, BLOOD QUANT CPT-4: 27330        2018

 

                    ADMIN INFLUENZA VIRUS VAC CPT-4:         2018

 

                    ROUTINE VENIPUNCTURE CPT-4: 36426        2018

 

                    COMPREHEN METABOLIC PANEL CPT-4: 49857        2018

 

                    COMPLETE CBC W/AUTO DIFF WBC CPT-4: 71893        2018

 

                    A1C HPLC            CPT-4: 47168        2018

 

                    ASSAY OF TROPONIN QUANT CPT-4: 69951        2018

 

                    LIPID PANEL         CPT-4: 84721        2018

 

                    THER/PROPH/DIAG INJ SC/IM CPT-4: 55303        2018

 

                    TRIAMCINOLONE ACET INJ NOS CPT-4:         2018

 

                    URINALYSIS NONAUTO W/O SCOPE CPT-4: 32032        2018

 

                    URINE CULTURE/ COLONY COUNT CPT-4: 27191        2018

 

                    FLU VACC PRSV FREE INC ANTIG 65 AND OLDER CPT-4: 53954      

  10/06/2017

 

                    ADMIN INFLUENZA VIRUS VAC CPT-4:         10/06/2017

 

                    ROUTINE VENIPUNCTURE CPT-4: 67362        2017

 

                    COMPREHEN METABOLIC PANEL CPT-4: 56947        2017

 

                    COMPLETE CBC W/AUTO DIFF WBC CPT-4: 32523        2017

 

                    LIPID PANEL         CPT-4: 10194        2017

 

                    A1C HPLC            CPT-4: 37020        2017

 

                    ASSAY THYROID STIM HORMONE CPT-4: 57354        2017

 

                    ROUTINE VENIPUNCTURE CPT-4: 48592        2017

 

                    ASSAY THYROID STIM HORMONE CPT-4: 52473        2017

 

                    COMPREHEN METABOLIC PANEL CPT-4: 76274        2017

 

                    COMPLETE CBC W/AUTO DIFF WBC CPT-4: 62336        2017

 

                    A1C HPLC            CPT-4: 03491        2017

 

                    FLU VACC PRSV FREE INC ANTIG 65 AND OLDER CPT-4: 39472      

  10/07/2016

 

                    ADMIN INFLUENZA VIRUS VAC CPT-4:         10/07/2016

 

                    ROUTINE VENIPUNCTURE CPT-4: 65083        2016

 

                    ASSAY OF FREE THYROXINE CPT-4: 45792        2016

 

                    ASSAY THYROID STIM HORMONE CPT-4: 43440        2016

 

                    COMPREHEN METABOLIC PANEL CPT-4: 24355        2016

 

                    COMPLETE CBC W/AUTO DIFF WBC CPT-4: 53076        2016

 

                    LIPID PANEL         CPT-4: 32966        2016

 

                    A1C HPLC            CPT-4: 44266        2016

 

                    URINALYSIS NONAUTO W/O SCOPE CPT-4: 95809        2016

 

                    ROUTINE VENIPUNCTURE CPT-4: 33549        2015

 

                    METABOLIC PANEL TOTAL CA CPT-4: 61138        2015

 

                    PRESCRIP TRANSMIT VIA ERX SY CPT-4:         2015

 

                    FLU VACC PRSV FREE INC ANTIG 65 AND OLDER CPT-4: 34570      

  10/16/2015

 

                    ADMIN INFLUENZA VIRUS VAC CPT-4:         10/16/2015

 

                    URINALYSIS NONAUTO W/O SCOPE CPT-4: 56525        09/15/2015

 

                    URINE CULTURE/ COLONY COUNT CPT-4: 78631        09/15/2015

 

                    ROUTINE VENIPUNCTURE CPT-4: 78428        09/10/2015

 

                    ASSAY OF FREE THYROXINE CPT-4: 78874        09/10/2015

 

                    ASSAY THYROID STIM HORMONE CPT-4: 67908        09/10/2015

 

                    COMPREHEN METABOLIC PANEL CPT-4: 14191        09/10/2015

 

                    COMPLETE CBC W/AUTO DIFF WBC CPT-4: 66625        09/10/2015

 

                    LIPID PANEL         CPT-4: 31021        09/10/2015

 

                    A1C HPLC            CPT-4: 39016        09/10/2015

 

                    CERUM REMOVAL       CPT-4: 96940        2015

 

                    PRESCRIP TRANSMIT VIA ERX SY CPT-4:         2015

 

                    FLUZONE, 5ML (Medicare) CPT-4:         10/17/2014

 

                    ADMIN INFLUENZA VIRUS VAC CPT-4:         10/17/2014

 

                    PRESCRIP TRANSMIT VIA ERX SY CPT-4:         2014

 

                    PRESCRIP TRANSMIT VIA ERX SY CPT-4:         2014

 

                    PRESCRIP TRANSMIT VIA ERX SY CPT-4:         2014

 

                    ROUTINE VENIPUNCTURE CPT-4: 67738        2014

 

                    ASSAY OF FREE THYROXINE CPT-4: 61129        2014

 

                    ASSAY THYROID STIM HORMONE CPT-4: 76822        2014

 

                    COMPREHEN METABOLIC PANEL CPT-4: 29449        2014

 

                    COMPLETE CBC W/AUTO DIFF WBC CPT-4: 42002        2014

 

                    LIPID PANEL         CPT-4: 26186        2014

 

                    A1C HPLC            CPT-4: 83460        2014

 

                    VITAMIN B 12 FOLIC ACID CPT-4: 25599|16177  2014

 

                    PRESCRIP TRANSMIT VIA ERX SY CPT-4:         2014

 

                    PRESCRIP TRANSMIT VIA ERX SY CPT-4:         10/15/2013

 

                    FLUZONE, 5ML (Medicare) CPT-4:         2013

 

                    ADMIN INFLUENZA VIRUS VAC CPT-4:         2013

 

                    PRESCRIP TRANSMIT VIA ERX SY CPT-4:         2013

 

                    PRESCRIP TRANSMIT VIA ERX SY CPT-4:         05/10/2013

 

                    ROUTINE VENIPUNCTURE CPT-4: 06472        2012

 

                    ASSAY OF FREE THYROXINE CPT-4: 31832        2012

 

                    ASSAY THYROID STIM HORMONE CPT-4: 49322        2012

 

                    COMPREHEN METABOLIC PANEL CPT-4: 04875        2012

 

                    COMPLETE CBC W/AUTO DIFF WBC CPT-4: 93939        2012

 

                    LIPID PANEL         CPT-4: 78740        2012

 

                    A1C GLYCOSYLATED HEMOGLOBIN TEST CPT-4: 01392        

012

 

                    CERUM REMOVAL       CPT-4: 97588        2012

 

                    PRESCRIP TRANSMIT VIA ERX SY CPT-4:         2012

 

                    PRESCRIP TRANSMIT VIA ERX SY CPT-4:         10/10/2012

 

                    FLUZONE, 5ML (Medicare) CPT-4:         2012

 

                    ADMIN INFLUENZA VIRUS VAC CPT-4:         2012

 

                    ASSAY, GLUCOSE, BLOOD QUANT CPT-4: 98597        2012

 

                    URINALYSIS NONAUTO W/O SCOPE CPT-4: 91039        2012

 

                    URINE CULTURE/ COLONY COUNT CPT-4: 32191        2012

 

                    ROUTINE VENIPUNCTURE CPT-4: 85642        2012

 

                    ASSAY OF FREE THYROXINE CPT-4: 98619        2012

 

                    ASSAY THYROID STIM HORMONE CPT-4: 17266        2012

 

                    COMPREHEN METABOLIC PANEL CPT-4: 59000        2012

 

                    COMPLETE CBC W/AUTO DIFF WBC CPT-4: 50781        2012

 

                    LIPID PANEL         CPT-4: 58596        2012

 

                    ASSAY OF INSULIN    CPT-4: 95724        2012

 

                    A1C GLYCOSYLATED HEMOGLOBIN TEST CPT-4: 77695        

012

 

                    DRAIN/INJECT JOINT/BURSA CPT-4: 49775        2012

 

                    METHYLPREDNISOLONE 40 MG INJ CPT-4:         2012

 

                    TRIAMCINOLONE ACET INJ NOS CPT-4:         2012

 

                    PRESCRIP TRANSMIT VIA ERX SY CPT-4:         2012

 

                    PRESCRIP TRANSMIT VIA ERX SY CPT-4:         2012

 

                    METHYLPREDNISOLONE 40 MG INJ CPT-4:         2012

 

                    DRAIN/INJECT JOINT/BURSA CPT-4: 04575        2012

 

                    TRIAMCINOLONE ACET INJ NOS CPT-4:         2012

 

                    PRESCRIP TRANSMIT VIA ERX SY CPT-4:         2012

 

                    ROUTINE VENIPUNCTURE CPT-4: 55977        2012

 

                    ASSAY OF FREE THYROXINE CPT-4: 69711        2012

 

                    ASSAY THYROID STIM HORMONE CPT-4: 53487        2012

 

                    COMPREHEN METABOLIC PANEL CPT-4: 62676        2012

 

                    COMPLETE CBC W/AUTO DIFF WBC CPT-4: 15552        2012

 

                    LIPID PANEL         CPT-4: 03313        2012

 

                    PRESCRIP TRANSMIT VIA ERX SY CPT-4:         2012

 

                    CERUM REMOVAL       CPT-4: 32129        2011

 

                    PRESCRIP TRANSMIT VIA ERX SY CPT-4:         2011

 

                    FLUZONE, 5ML (Medicare) CPT-4:         10/05/2011

 

                    ADMIN INFLUENZA VIRUS VAC CPT-4:         10/05/2011

 

                    PRESCRIP TRANSMIT VIA ERX SY CPT-4:         2011

 

                    URINALYSIS NONAUTO W/O SCOPE CPT-4: 35877        2011

 

                    URINE CULTURE/ COLONY COUNT CPT-4: 43226        2011

 

                    CUR TOBACCO NON-USER CPT-4:         2011

 

                    ROUTINE VENIPUNCTURE CPT-4: 69330        2011

 

                    COMPLETE CBC W/AUTO DIFF WBC CPT-4: 08863        2011

 

                    COMPREHEN METABOLIC PANEL CPT-4: 14357        2011

 

                    LIPID PANEL         CPT-4: 79868        2011

 

                    ASSAY THYROID STIM HORMONE CPT-4: 69233        2011

 

                    ASSAY OF FREE THYROXINE CPT-4: 35656        2011

 

                    PRESCRIP TRANSMIT VIA ERX SY CPT-4:         2011

 

                    INJ TRIGGER POINT 1/2 MUSCL CPT-4: 35818        2011

 

                    TRIAMCINOLONE ACET INJ NOS CPT-4:         2011

 

                    METHYLPREDNISOLONE 40 MG INJ CPT-4:         2011

 

                    THER/PROPH/DIAG INJ SC/IM CPT-4: 46214        2011

 

                    KETOROLAC TROMETHAMINE INJ CPT-4:         2011

 

                    PRESCRIP TRANSMIT VIA ERX SY CPT-4:         2010

 

                    FLU VACCINE 3 YRS & > IM UP 64 CPT-4: 88378        10/06/201

0

 

                    ADMIN INFLUENZA VIRUS VAC CPT-4:         10/06/2010

 

                    URINALYSIS NONAUTO W/O SCOPE CPT-4: 47104        2010

 

                    URINE CULTURE/ COLONY COUNT CPT-4: 92711        2010

 

                    PRESCRIP TRANSMIT VIA ERX SY CPT-4:         2010

 

                    THER/PROPH/DIAG INJ SC/IM CPT-4: 84068        2010

 

                    VITAMIN B12 INJECTION CPT-4:         2010

 

                    THER/PROPH/DIAG INJ SC/IM CPT-4: 57888        2010

 

                    VITAMIN B12 INJECTION CPT-4:         2010

 

                    ROUTINE VENIPUNCTURE CPT-4: 23872        2010







Vital Signs





                          Date                      Vital

 

                    2020          Blood Pressure 1: 144/82 Code: 8480-6 

art Rate 1: 56 bpm

 

             2019   Blood Pressure 1: 140/70 Code: 8480-6 Heart Rate 1: 57

 bpm SpO2: 99%    

Temperature: 35.9 (C) / 96.6 (F)        Weight: 215 lbs 

 

                06/10/2019      Blood Pressure 1: 144/70 Code: 8480-6 Heart Rate

 1: 60 bpm 

Respiratory Rate: 20 bpm SpO2: 95%           Temperature: 36.4 (C) / 97.6 (F) We

ight: 214 

lbs 

 

                2019      Blood Pressure 1: 128/78 Code: 8480-6 Heart Rate

 1: 56 bpm 

Respiratory Rate: 20 bpm SpO2: 98%           Temperature: 36.3 (C) / 97.4 (F) We

ight: 213 

lbs 

 

                2018      Blood Pressure 1: 142/60 Code: 8480-6 BMI: 38.2 

Code: 19705-7 Heart 

Rate 1: 48 bpm  Height: 5'2"    Respiratory Rate: 20 bpm SpO2: 98%       Tempera

ture: 36.7

(C) / 98.1 (F)                          Weight: 212 lbs 

 

                2018      Blood Pressure 1: 142/64 Code: 8480-6 BMI: 38.5 

Code: 56143-6 Heart 

Rate 1: 52 bpm  Height: 5'2"    Respiratory Rate: 22 bpm SpO2: 96%       Tempera

ture: 36.1

(C) / 96.9 (F)                          Weight: 214 lbs 

 

                10/02/2018      Blood Pressure 1: 124/80 Code: 8480-6 BMI: 38.3 

Code: 21145-8 Heart 

Rate 1: 68 bpm      Height: 5'2"        Respiratory Rate: 20 bpm Temperature: 36

.3 (C) / 

97.4 (F)                                Weight: 213 lbs 

 

                2018      Blood Pressure 1: 132/78 Code: 8480-6 BMI: 37.6 

Code: 49332-9 Heart 

Rate 1: 68 bpm  Height: 5'2"    Respiratory Rate: 20 bpm SpO2: 97%       Tempera

ture: 36.8

(C) / 98.2 (F)                          Weight: 209 lbs 

 

                2018      Blood Pressure 1: 150/76 Code: 8480-6 BMI: 38.5 

Code: 98945-6 Heart 

Rate 1: 64 bpm  Height: 5'2"    Respiratory Rate: 20 bpm SpO2: 97%       Tempera

ture: 36.2

(C) / 97.2 (F)                          Weight: 214 lbs 

 

                2018      Blood Pressure 1: 122/74 Code: 8480-6 BMI: 38.2 

Code: 29656-3 Heart 

Rate 1: 64 bpm  Height: 5'2"    Respiratory Rate: 18 bpm SpO2: 96%       Tempera

ture: 35.8

(C) / 96.4 (F)                          Weight: 212 lbs 

 

                2018      Blood Pressure 1: 124/78 Code: 8480-6 BMI: 37.8 

Code: 24259-5 Heart 

Rate 1: 76 bpm      Height: 5'2"        Respiratory Rate: 20 bpm Temperature: 36

.8 (C) / 

98.3 (F)                                Weight: 210 lbs 

 

                2018      Blood Pressure 1: 136/70 Code: 8480-6 BMI: 38.0 

Code: 59250-3 Heart 

Rate 1: 68 bpm  Height: 5'2"    Respiratory Rate: 20 bpm SpO2: 97%       Tempera

ture: 36.8

(C) / 98.2 (F)                          Weight: 211 lbs 

 

                2018      Blood Pressure 1: 140/65 Code: 8480-6 Heart Rate

 1: 75 bpm 

Respiratory Rate: 24 bpm SpO2: 95%           Temperature: 37.0 (C) / 98.6 (F) We

ight: 211 

lbs 

 

                2018      Blood Pressure 1: 154/70 Code: 8480-6 BMI: 37.6 

Code: 75840-4 Heart 

Rate 1: 76 bpm  Height: 5'2"    Respiratory Rate: 20 bpm SpO2: 98%       Tempera

ture: 36.9

(C) / 98.5 (F)                          Weight: 209 lbs 

 

                2017      Blood Pressure 1: 156/70 Code: 8480-6 BMI: 37.1 

Code: 58144-9 Heart 

Rate 1: 72 bpm  Height: 5'2"    Respiratory Rate: 20 bpm SpO2: 97%       Tempera

ture: 37.0

(C) / 98.6 (F)                          Weight: 206 lbs 

 

                2017      Blood Pressure 1: 152/78 Code: 8480-6 BMI: 36.8 

Code: 64109-2 Heart 

Rate 1: 78 bpm  Height: 5'2"    Respiratory Rate: 20 bpm SpO2: 98%       Tempera

ture: 36.1

(C) / 97.0 (F)                          Weight: 204 lbs 

 

                2017      Blood Pressure 1: 142/70 Code: 8480-6 BMI: 36.9 

Code: 14191-7 Heart 

Rate 1: 64 bpm  Height: 5'2"    Respiratory Rate: 20 bpm SpO2: 96%       Tempera

ture: 36.5

(C) / 97.7 (F)                          Weight: 205 lbs 

 

                2017      Blood Pressure 1: 142/68 Code: 8480-6 Heart Rate

 1: 88 bpm 

Respiratory Rate: 18 bpm SpO2: 98%           Temperature: 36.3 (C) / 97.3 (F) We

ight: 209 

lbs 

 

                2016      Blood Pressure 1: 130/78 Code: 8480-6 Heart Rate

 1: 68 bpm 

Respiratory Rate: 20 bpm SpO2: 95%           Temperature: 36.4 (C) / 97.6 (F) We

ight: 212 

lbs 

 

                2016      Blood Pressure 1: 122/64 Code: 8480-6 BMI: 39.1 

Code: 69385-3 Heart 

Rate 1: 76 bpm      Height: 5'2"        Respiratory Rate: 20 bpm Temperature: 36

.8 (C) / 

98.2 (F)                                Weight: 217 lbs 

 

                2016      Blood Pressure 1: 144/70 Code: 8480-6 BMI: 39.4 

Code: 70115-3 Heart 

Rate 1: 76 bpm      Height: 5'2"        Respiratory Rate: 20 bpm Temperature: 36

.6 (C) / 

97.9 (F)                                Weight: 219 lbs 

 

                2015      Blood Pressure 1: 152/60 Code: 8480-6 BMI: 39.6 

Code: 68260-9 Heart 

Rate 1: 84 bpm      Height: 5'2"        Respiratory Rate: 20 bpm Temperature: 37

.0 (C) / 

98.6 (F)                                Weight: 220 lbs 

 

                2015      Blood Pressure 1: 146/76 Code: 8480-6 BMI: 39.8 

Code: 17670-0 Heart 

Rate 1: 88 bpm      Height: 5'2"        Respiratory Rate: 20 bpm Temperature: 37

.0 (C) / 

98.6 (F)                                Weight: 221 lbs 

 

                2015      Blood Pressure 1: 132/70 Code: 8480-6 BMI: 39.1 

Code: 75206-2 Heart 

Rate 1: 88 bpm      Height: 5'2"        Respiratory Rate: 20 bpm Temperature: 36

.4 (C) / 

97.6 (F)                                Weight: 217 lbs 

 

                2015      Blood Pressure 1: 132/66 Code: 8480-6 BMI: 39.9 

Code: 08476-9 Heart 

Rate 1: 72 bpm      Height: 5'2"        Respiratory Rate: 20 bpm Temperature: 36

.9 (C) / 

98.4 (F)                                Weight: 218 lbs 

 

                2015      Blood Pressure 1: 136/80 Code: 8480-6 Heart Rate

 1: 76 bpm 

Respiratory Rate: 20 bpm  Temperature: 36.7 (C) / 98.0 (F) Weight: 224 lbs 

 

                2015      Blood Pressure 1: 134/78 Code: 8480-6 BMI: 39.7 

Code: 82122-9 Heart 

Rate 1: 84 bpm      Height: 5'2"        Respiratory Rate: 20 bpm Temperature: 36

.7 (C) / 

98.0 (F)                                Weight: 217 lbs 

 

                2014      Blood Pressure 1: 146/78 Code: 8480-6 BMI: 39.5 

Code: 20378-2 Heart 

Rate 1: 82 bpm      Height: 5'2"        Respiratory Rate: 18 bpm Temperature: 35

.6 (C) / 

96.1 (F)                                Weight: 216 lbs 

 

                2014      Blood Pressure 1: 134/70 Code: 8480-6 BMI: 37.9 

Code: 02394-1 Heart 

Rate 1: 80 bpm      Height: 5'3"        Respiratory Rate: 20 bpm Temperature: 36

.8 (C) / 

98.2 (F)                                Weight: 214 lbs 

 

                2014      Blood Pressure 1: 132/70 Code: 8480-6 BMI: 37.6 

Code: 74663-4 Heart 

Rate 1: 80 bpm      Height: 5'3"        Respiratory Rate: 20 bpm Temperature: 36

.8 (C) / 

98.3 (F)                                Weight: 212 lbs 

 

                2014      Blood Pressure 1: 116/74 Code: 8480-6 Heart Rate

 1: 68 bpm 

Respiratory Rate: 20 bpm  Temperature: 36.2 (C) / 97.1 (F) Weight: 212 lbs 

 

                10/15/2013      Blood Pressure 1: 132/82 Code: 8480-6 BMI: 37.4 

Code: 49580-5 Heart 

Rate 1: 76 bpm      Height: 5'3"        Respiratory Rate: 20 bpm Temperature: 36

.7 (C) / 

98.0 (F)                                Weight: 211 lbs 

 

                    2013          Blood Pressure 1: 130/76 Code: 8480-6 He

art Rate 1: 78 bpm

 

                2013      Blood Pressure 1: 140/82 Code: 8480-6 BMI: 36.8 

Code: 90087-2 Heart 

Rate 1: 66 bpm      Height: 5'3"        Respiratory Rate: 20 bpm Temperature: 36

.1 (C) / 

96.9 (F)                                Weight: 208 lbs 

 

                2013      Blood Pressure 1: 138/80 Code: 8480-6 BMI: 36.4 

Code: 73957-3 Heart 

Rate 1: 72 bpm      Height: 5'4"        Respiratory Rate: 20 bpm Temperature: 36

.7 (C) / 

98.0 (F)                                Weight: 212 lbs 

 

                2013      Blood Pressure 1: 124/70 Code: 8480-6 BMI: 36.9 

Code: 13833-8 Heart 

Rate 1: 60 bpm      Height: 5'4"        Temperature: 36.1 (C) / 97.0 (F) Weight:

 215 lbs 

 

                05/10/2013      Blood Pressure 1: 132/86 Code: 8480-6 BMI: 36.9 

Code: 01663-2 Heart 

Rate 1: 76 bpm      Height: 5'4"        Respiratory Rate: 20 bpm Temperature: 36

.8 (C) / 

98.2 (F)                                Weight: 215 lbs 

 

                2013      Blood Pressure 1: 134/82 Code: 8480-6 BMI: 36.6 

Code: 10076-3 Heart 

Rate 1: 72 bpm      Height: 5'4"        Respiratory Rate: 20 bpm Temperature: 36

.3 (C) / 

97.4 (F)                                Weight: 213 lbs 

 

                2012      Blood Pressure 1: 142/80 Code: 8480-6 BMI: 37.1 

Code: 05836-1 Heart 

Rate 1: 76 bpm      Height: 5'4"        Respiratory Rate: 20 bpm Temperature: 36

.8 (C) / 

98.3 (F)                                Weight: 216 lbs 

 

                10/23/2012      Blood Pressure 1: 128/68 Code: 8480-6 BMI: 37.6 

Code: 67916-7 Heart 

Rate 1: 72 bpm      Height: 5'4"        Respiratory Rate: 20 bpm Temperature: 36

.6 (C) / 

97.9 (F)                                Weight: 219 lbs 

 

                10/10/2012      Blood Pressure 1: 122/70 Code: 8480-6 Heart Rate

 1: 76 bpm 

Respiratory Rate: 20 bpm  Temperature: 36.7 (C) / 98.1 (F) Weight: 218 lbs 

 

                    2012          Blood Pressure 1: 132/80 Code: 8480-6 He

art Rate 1: 84 bpm

 

                2012      Blood Pressure 1: 128/78 Code: 8480-6 BMI: 38.1 

Code: 16816-4 Heart 

Rate 1: 84 bpm      Height: 5'4"        Respiratory Rate: 20 bpm Temperature: 36

.9 (C) / 

98.4 (F)                                Weight: 222 lbs 

 

                2012      Blood Pressure 1: 138/80 Code: 8480-6 BMI: 37.9 

Code: 09961-5 Heart 

Rate 1: 74 bpm      Height: 5'4"        Temperature: 36.1 (C) / 97.0 (F) Weight:

 221 lbs 

 

                2012      Blood Pressure 1: 126/78 Code: 8480-6 BMI: 38.4 

Code: 35276-9 Heart 

Rate 1: 72 bpm      Height: 5'4"        Respiratory Rate: 20 bpm Temperature: 36

.7 (C) / 

98.0 (F)                                Weight: 224 lbs 

 

                2012      Blood Pressure 1: 138/72 Code: 8480-6 BMI: 38.4 

Code: 02749-2 Heart 

Rate 1: 72 bpm      Height: 5'4"        Respiratory Rate: 20 bpm Temperature: 36

.6 (C) / 

97.9 (F)                                Weight: 224 lbs 

 

                2012      Blood Pressure 1: 122/78 Code: 8480-6 BMI: 38.8 

Code: 57931-2 Heart 

Rate 1: 88 bpm      Height: 5'4"        Respiratory Rate: 20 bpm Temperature: 36

.6 (C) / 

97.8 (F)                                Weight: 226 lbs 

 

                2012      Blood Pressure 1: 116/60 Code: 8480-6 BMI: 38.1 

Code: 16357-4 Heart 

Rate 1: 92 bpm      Height: 5'4"        Respiratory Rate: 20 bpm Temperature: 36

.8 (C) / 

98.2 (F)                                Weight: 222 lbs 

 

                2011      Blood Pressure 1: 118/62 Code: 8480-6 BMI: 37.9 

Code: 13111-5 Heart 

Rate 1: 80 bpm      Height: 5'4"        Temperature: 36.5 (C) / 97.7 (F) Weight:

 221 lbs 

 

                2011      Blood Pressure 1: 132/70 Code: 8480-6 Heart Rate

 1: 84 bpm 

Respiratory Rate: 20 bpm  Temperature: 36.7 (C) / 98.0 (F) Weight: 221 lbs 

 

                2011      Blood Pressure 1: 114/72 Code: 8480-6 BMI: 37.6 

Code: 23482-4 Heart 

Rate 1: 76 bpm      Height: 5'4"        Respiratory Rate: 20 bpm Temperature: 36

.8 (C) / 

98.3 (F)                                Weight: 219 lbs 

 

                    2011          Blood Pressure 1: 136/76 Code: 8480-6 Te

mperature: 36.0 (C) / 96.8 

(F)                                     Weight: 219 lbs 8 oz

 

                2011      Blood Pressure 1: 128/80 Code: 8480-6 Heart Rate

 1: 72 bpm 

Temperature: 36.3 (C) / 97.4 (F)        Weight: 218 lbs 

 

                2011      Blood Pressure 1: 126/70 Code: 8480-6 Heart Rate

 1: 72 bpm 

Temperature: 36.7 (C) / 98.0 (F)

 

                    2010          Blood Pressure 1: 126/78 Code: 8480-6 Te

mperature: 36.2 (C) / 97.1 

(F)                                     Weight: 217 lbs 8 oz

 

                2010      Blood Pressure 1: 116/70 Code: 8480-6 Heart Rate

 1: 72 bpm 

Temperature: 36.4 (C) / 97.6 (F)        Weight: 222 lbs 

 

                2010      Blood Pressure 1: 114/78 Code: 8480-6 Heart Rate

 1: 72 bpm 

Temperature: 37.1 (C) / 98.8 (F)        Weight: 225 lbs 

 

                2010      Blood Pressure 1: 128/78 Code: 8480-6 Heart Rate

 1: 76 bpm 

Temperature: 36.6 (C) / 97.8 (F)        Weight: 224 lbs 

 

                2010      Blood Pressure 1: 116/78 Code: 8480-6 Heart Rate

 1: 80 bpm 

Temperature: 36.4 (C) / 97.6 (F)        Weight: 226 lbs 

 

                2010      Blood Pressure 1: 120/70 Code: 8480-6 BMI: 38.3 

Code: 72764-0 Heart 

Rate 1: 88 bpm      Height: 5'5"        Temperature: 36.4 (C) / 97.6 (F) Weight:

 230 lbs 







Functional Status

No Functional Status data



Reason For Visit





                    Reason For Visit    Effective Dates     Notes

 

                    blood pressure check 2020           

 

                    lab draw            2019           

 

                    lab draw            10/11/2019           

 

                    hearing loss        2019          left ear

 

                    follow up           06/10/2019           

 

                    follow up           2019          Patient has upcoming

 appointment with Dr Claire and carotid 

dopplers tomorrow

 

                    follow up           2018          Patient had heart ca

th on 10-30-18 and one of the bypass 

veins in spasming

 

                    follow up           2018          4 Week

 

                    follow up           10/02/2018           

 

                    follow up           2018           

 

                    high blood pressure 2018          Dr Claire has ordered

 holter monitor and 

hydralazine 50mg PRN

 

                    dizziness           2018          On  morning pa

tient woke up and went to the bathroom 

and after lying down became very dizzy. Patient has hx of vertigo so didn't 
think anything of it. She usually just has them intermittently, but had more 
that day. While at [employer] began to feel very ill and became very shaky. She got 
home and sat in the recliner and had the jittery/nervous feeling. Later that 
night it finally resolved. Patient feels like she had a spell like this around 
the  and thought it was due to extreme heat exhaustion.

 

                    follow up           2018           

 

                    back pain           2018           

 

                    otalgia             2018           

 

                    back pain           2018           

 

                    injection(s)        10/06/2017          flu shot

 

                    lab draw            2017           

 

                    follow up           2017           

 

                    pelvic pain         2017          Patient states pain 

started in May with a "Constant Ache"

to left inguinal area. Patient states at that time pain was intermittent. Then, 
approximately 2nd week  of  pain worsened and radiates to left low back 
area.

 

                    lab draw            2017           

 

                    hyperglycemia       2017           

 

                    cough               2017           

 

                    otalgia             2016           

 

                    injection(s)        10/07/2016          Influenza

 

                    lab draw            2016           

 

                    constipation        2016           

 

                    flank pain          2016           

 

                    follow up           2015          3mo fwup

 

                    injection(s)        10/16/2015          Influenza

 

                    acute renal failure 09/15/2015           

 

                    lab draw            09/10/2015           

 

                    fatigue             2015           

 

                    otalgia             2015           

 

                    pain, limb          2015           

 

                    follow up           2015          [facility] fwup

 

                    high blood pressure 2015           

 

                    injection(s)        10/17/2014          flu shot

 

                    sinusitis           2014           

 

                    high blood pressure 2014           

 

                    cough               2014          Was panfilo at Dr Tyree teixeira's office so he started he on amlodipine 

10mg but seems to be dragging her down. Patient decreased to 1/2 tablet this 
last week

 

                    lab draw            2014           

 

                    cough               2014           

 

                    cough               10/15/2013           

 

                    high blood pressure 2013           

 

                    follow up           2013          ER

 

                    dizziness           2013           

 

                    follow up           2013           

 

                    otalgia             05/10/2013           

 

                    breast complaint    2013           

 

                    lab draw            2012           

 

                    follow up           2012          2mo fwup

 

                    follow up           10/23/2012          2wk fwup

 

                    gas and bloating    10/10/2012          Dr Claire has her mon

itoring because was high in his 

office at last visit

 

                    injection(s)        2012           

 

                    dizziness           2012           

 

                    dizziness           2012           

 

                    lab draw            2012           

 

                    muscle weakness     2012          concerns of simvasta

tin with fatigue and muscle 

weakness

 

                    leg pain/sciatica   2012           

 

                    hip pain            2012           

 

                    back pain           2012          has tried ice pack, 

muscle relaxers, and moist heat

 

                    back pain           2012           

 

                    fatigue             2012          in hands/feet

 

                    otalgia             2011           

 

                    follow up           2011          2wk fwup

 

                    back pain           2011          thinks ulcer may hav

e returned

 

                    lab draw            2011           

 

                    dizziness           2011          Left ear and facial 

pain, right ear pain 

 

                    follow up           2011          1wk fwup

 

                    hip pain            2011          feels very draggy, f

atigue, BP 80/63, normally running 

100's/60's

 

                    chills              2010           

 

                    injection(s)        10/06/2010          flu shot

 

                    follow up           2010          6wk fwup, had HH rep

aired and is starting to feel better, 

started on reglan 10mg tid

 

                    follow up           2010          hosp fwup

 

                    follow up           2010          1mo fwup, saw Dr Danna du and hasn't gave cardiac clearance 

yet for HH repair, did have chemical stress test yesterday and waiting on 
results

 

                    follow up           2010          from EGD/colonoscopy

--has Hiatal Hernia and wants to do 

repair

 

                    follow up           2010           







Encounters





             Encounter    Performer    Location     Codes        Date

 

                                        () NURSE/OUTPATIENT VISIT EST

Diagnosis: Mixed hyperlipidemia[ICD10: E78.2] Akanksha SPENCERER DO [facility]            CPT-4: 47864              2019

 

                                        ([id number]) NURSE/OUTPATIENT VISIT EST

Diagnosis: FLU VACCINE[ICD10: Z23] Akanksha BAUMAN SAramis O[facility]END

ER DO 

[place]                       CPT-4: 40997              10/22/2019

 

                                        (22351) NURSE/OUTPATIENT VISIT EST

Diagnosis: Chronic kidney disease, stage 1[ICD10: N18.1] Akanksha SPENCERER DO [facility] CPT-4: 49317              10/11/2019

 

                                        (52033) OFFICE/OUTPATIENT VISIT EST

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

 

                                        ([id number]) OFFICE/OUTPATIENT VISIT EST

Diagnosis: Essential (primary) hypertension[ICD10: I10]

Diagnosis: Coronary atherosclerosis due to calcified coronary lesion[ICD10: 
I25.84]

Diagnosis: Hypoglycemia, unspecified[ICD10: E16.2]

Diagnosis: Other fatigue[ICD10: R53.83]

Diagnosis: Urinary tract infection, site not specified[ICD10: N39.0] Akanksha DRIVER [employer] [facility] CPT-4: 98525        06/10/2019

 

                                        (25752) OFFICE/OUTPATIENT VISIT EST

Diagnosis: Essential (primary) hypertension[ICD10: I10]

Diagnosis: Gastro-esophageal reflux disease without esophagitis[ICD10: K21.9]

Diagnosis: Urinary tract infection, site not specified[ICD10: N39.0] Akanksha DRIVER [employer] [facility] CPT-4: 47024        2019

 

                                        (25045) OFFICE/OUTPATIENT VISIT EST

Diagnosis: Gastro-esophageal reflux disease without esophagitis[ICD10: K21.9]

Diagnosis: Epigastric pain[ICD10: R10.13] Akankhsa DRIVER [employer]            CPT-4: 39856              2018

 

                                        ([id number]) OFFICE/OUTPATIENT VISIT EST

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

Diagnosis: Essential (primary) hypertension[ICD10: I10]

Diagnosis: Generalized anxiety disorder[ICD10: F41.1]

Diagnosis: Gastro-esophageal reflux disease without esophagitis[ICD10: K21.9] 

Akanksha DRIVER [employer] [facility] CPT-4: 35486        2018

 

                                        OFFICE/OUTPATIENT VISIT EST

Diagnosis: Gastro-esophageal reflux disease without esophagitis[ICD10: K21.9]

Diagnosis: Palpitations[ICD10: R00.2]

Diagnosis: Urinary tract infection, site not specified[ICD10: N39.0]

Diagnosis: Generalized anxiety disorder[ICD10: F41.1] Akanksha DRIVER [employer] [facility] CPT-4: 82277              10/02/2018

 

                                        (92383) NURSE/OUTPATIENT VISIT EST

Diagnosis: Coronary atherosclerosis due to calcified coronary lesion[ICD10: 
I25.84]

Diagnosis: Hypoglycemia, unspecified[ICD10: E16.2]

Diagnosis: Dizziness and giddiness[ICD10: R42]

Diagnosis: Occlusion and stenosis of bilateral carotid arteries[ICD10: I65.23] 

Akanksha DRIVER [employer] [facility] CPT-4: 63225        2018

 

                                        OFFICE/OUTPATIENT VISIT EST

Diagnosis: Epigastric pain[ICD10: R10.13]

Diagnosis: Generalized anxiety disorder[ICD10: F41.1]

Diagnosis: FLU VACCINE[ICD10: Z23] Akanksha KEY [employer] 

[facility]                       CPT-4: 36709              2018

 

                                        (14019) OFFICE/OUTPATIENT VISIT EST

Diagnosis: Essential (primary) hypertension[ICD10: I10]

Diagnosis: Hypoglycemia, unspecified[ICD10: E16.2]

Diagnosis: Gastro-esophageal reflux disease without esophagitis[ICD10: K21.9] 

Akanksha DRIVER [facility] CPT-4: 53875        2018

 

                                        ([id number]) OFFICE/OUTPATIENT VISIT EST

Diagnosis: Dizziness and giddiness[ICD10: R42] Nat DRIVER [employer] [facility]            CPT-4: 24325              2018

 

                                        ([id number]) OFFICE/OUTPATIENT VISIT EST

Diagnosis: Cervicalgia[ICD10: M54.2]

Diagnosis: Other spondylosis with radiculopathy, cervical region[ICD10: M47.22] 

Akanksha DRIVER [employer] [facility] CPT-4: 33465        2018

 

                                        ([id number]) OFFICE/OUTPATIENT VISIT EST

Diagnosis: Hypoglycemia, unspecified[ICD10: E16.2]

Diagnosis: Other spondylosis with radiculopathy, cervical region[ICD10: M47.22]

Diagnosis: Vertigo of central origin, bilateral[ICD10: H81.43]

Diagnosis: Cervicocranial syndrome[ICD10: M53.0] Akanksha KEVINANN MARIE CHEATHAMAramis VILLA [facility]         CPT-4: 17208              2018

 

                                        (95037) OFFICE/OUTPATIENT VISIT EST

Diagnosis: Benign paroxysmal vertigo, bilateral[ICD10: H81.13]

Diagnosis: Otalgia, bilateral[ICD10: H92.03] Nat Lozoya          NYARAMOS CHEATHAMAramis 

VILL[facility]            CPT-4: 89890              2018

 

                                        ([id number]) OFFICE/OUTPATIENT VISIT EST

Diagnosis: Low back pain[ICD10: M54.5]

Diagnosis: Radiculopathy, lumbosacral region[ICD10: M54.17]

Diagnosis: Left lower quadrant pain[ICD10: R10.32] Akanksha DE LA ROSA

[facility]         CPT-4: 26903              2018

 

                                        ([id number]) OFFICE/OUTPATIENT VISIT EST

Diagnosis: FLU VACCINE[ICD10: Z23] Akanksha BAUMAN SAramis TJ

[facility]                       CPT-4: 23260              10/06/2017

 

                                        ([id number]) OFFICE/OUTPATIENT VISIT EST

Diagnosis: Mixed hyperlipidemia[ICD10: E78.2]

Diagnosis: Essential (primary) hypertension[ICD10: I10]

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

Diagnosis: Impaired fasting glucose[ICD10: R73.01]

Diagnosis: Other fatigue[ICD10: R53.83] Akanksha BAUMAN SAramis 

[facility]ILL[facility]

[facility]                    CPT-4: 35354              2017

 

                                        ([id number]) OFFICE/OUTPATIENT VISIT EST

Diagnosis: Pain in thoracic spine[ICD10: M54.6]

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 

[facility] CPT-[facility]: 03512        2017

 

                                        ([id number]) OFFICE/OUTPATIENT VISIT EST

Diagnosis: Left lower quadrant pain[ICD10: R10.32]

Diagnosis: Low back pain[ICD10: M54.5] Akanksha SYKES 

[facility]                    CPT-4: 36632              2017

 

                                        ([id number]) OFFICE/OUTPATIENT VISIT EST

Diagnosis: Mixed hyperlipidemia[ICD10: E78.2]

Diagnosis: Essential (primary) hypertension[ICD10: I10]

Diagnosis: Hypoglycemia, unspecified[ICD10: E16.2] Akanksha Villa DRIVER [facility]         CPT-4: 85940              2017

 

                                        ([id number]) OFFICE/OUTPATIENT VISIT EST

Diagnosis: Impaired fasting glucose[ICD10: R73.01]

Diagnosis: Mastodynia[ICD10: N64.4]

Diagnosis: Mixed hyperlipidemia[ICD10: E78.2]

Diagnosis: Essential (primary) hypertension[ICD10: I10]

Diagnosis: Other fatigue[ICD10: R53.83] Akanksharj Driver        AKANKSHA SAramis 

VILLA

[facility]                    CPT-4: 72738              2017

 

                                        ([id number]) OFFICE/OUTPATIENT VISIT EST

Diagnosis: Acute upper respiratory infection, unspecified[ICD10: J06.9] Luba KEVINQUELINE SAramis VILLA [facility] CPT-4: 21756        2017

 

                                        ([id number]) OFFICE/OUTPATIENT VISIT EST

Diagnosis: Acute mastoiditis without complications, right ear[ICD10: H70.001] 

Akanksha Xiangndangela HIGHTOWERLINE SAramis VILLA [facility] CPT-4: 80106        2016

 

                                        (70662) OFFICE/OUTPATIENT VISIT EST

Diagnosis: FLU VACCINE[ICD10: Z23] Akanksha Xiangndangela HIGHTOWERLINE OLIVIA KEY [facility]                       CPT-4: 64444              10/07/2016

 

                                        ([id number]) OFFICE/OUTPATIENT VISIT EST

Diagnosis: Mixed hyperlipidemia[ICD10: E78.2]

Diagnosis: Essential (primary) hypertension[ICD10: I10]

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

Diagnosis: Impaired fasting glucose[ICD10: R73.01] Akanksha KEVIN

FELECIATAMMY

SAramis VILLA [facility]         CPT-4: 09403              2016

 

                                        ([id number]) OFFICE/OUTPATIENT VISIT EST

Diagnosis: Constipation, unspecified[ICD10: K59.00] Akanksha BAUMAN SAramis VILLA [facility] CPT-4: 07937              2016

 

                                        (35704) OFFICE/OUTPATIENT VISIT EST

Diagnosis: Essential (primary) hypertension[ICD10: I10]

Diagnosis: Mixed hyperlipidemia[ICD10: E78.2]

Diagnosis: Chronic kidney disease, stage 1[ICD10: N18.1] Akanksha DRIVER [employer] [facility] CPT-4: 32002              2016

 

                                        (38621) OFFICE/OUTPATIENT VISIT EST

Diagnosis: Muscle weakness (generalized)[ICD10: M62.81]

Diagnosis: Dizziness and giddiness[ICD10: R42]

Diagnosis: Essential (primary) hypertension[ICD10: I10]

Diagnosis: History of falling[ICD10: Z91.81] Akanksha DRIVER [employer] [facility]            CPT-4: 78319              2015

 

                                        ([id number]) OFFICE/OUTPATIENT VISIT EST

Diagnosis: FLU VACCINE[ICD10: Z23] Akanksha KEY [employer] 

[facility]                       CPT-4: 93671              10/16/2015

 

                                        (36083) OFFICE/OUTPATIENT VISIT EST

Diagnosis: Acute renal failure[ICD9: 584.9]

Diagnosis: POLYURIA[ICD9: 788.42] Akanksha LANCE [employer] 

[facility]                       CPT-4: 14832              09/15/2015

 

                                        (88002) OFFICE/OUTPATIENT VISIT EST

Diagnosis: HYPERLIPIDEMIA NEC/NOS[ICD9: 272.4]

Diagnosis: HYPERTENSION[ICD9: 401.9]

Diagnosis: CAD[ICD9: 414.00]

Diagnosis: Hyperglycemia[ICD9: 790.29]

Diagnosis: MALAISE AND FATIGUE[ICD9: 780.79] Akanksha DRIVER [employer] [facility]            CPT-4: 50727              09/10/2015

 

                                        (49530) OFFICE/OUTPATIENT VISIT EST

Diagnosis: MALAISE AND FATIGUE[ICD9: 780.79]

Diagnosis: HYPOGLYCEMIA[ICD9: 251.2]

Diagnosis: Grieving[ICD9: 309.0]

Diagnosis: ABDOMINAL PAIN[ICD9: 789.00] Akanksha DRIVER

[employer] [facility]                    CPT-4: 03562              2015

 

                                        OFFICE/OUTPATIENT VISIT EST

Diagnosis: CERUMEN IMPACTION[ICD9: 380.4]

Diagnosis: EUSTACHIAN TUBE DYSFUNCTION[ICD9: 381.81] Bertha DRIVER [facility] CPT-4: 88338              2015

 

                                        ([id number]) OFFICE/OUTPATIENT VISIT EST

Diagnosis: Leg pain[ICD9: 729.5]

Diagnosis: SUPERFIC PHLEBITIS-LEG[ICD9: 451.0] Akanksha DRIVER [facility]            CPT-4: 27742              2015

 

                                        ([id number]) OFFICE/OUTPATIENT VISIT EST

Diagnosis: Thoracic back pain[ICD9: 724.1]

Diagnosis: SPASM OF MUSCLE[ICD9: 728.85] Akanksha DRIVER [facility]            CPT-4: 70399              2015

 

                                        ([id number]) OFFICE/OUTPATIENT VISIT EST

Diagnosis: DIZZINESS/VERTIGO[ICD9: 780.4]

Diagnosis: Benign positional vertigo[ICD9: 386.11]

Diagnosis: HYPERTENSION[ICD9: 401.9]

Diagnosis: Suspicious nevus[ICD9: 238.2] Akanksha DRIVER [facility]            CPT-4: 30282              2015

 

                                        ([id number]) OFFICE/OUTPATIENT VISIT EST

Diagnosis: FLU VACCINE[ICD10: Z23] Akanksha SPENCER

[facility]                       CPT-4: 48995              10/17/2014

 

                                        OFFICE/OUTPATIENT VISIT EST

Diagnosis: SINUSITIS, ACUTE[ICD9: 461.9]

Diagnosis: EUSTACHIAN TUBE DYSFUNCTION[ICD9: 381.81] Bertha DRIVER [facility] CPT-4: 60018              2014

 

                                        (35397) OFFICE/OUTPATIENT VISIT EST

Diagnosis: DIZZINESS/VERTIGO[ICD9: 780.4]

Diagnosis: HYPERTENSION[ICD9: 401.9] Akanksha MEJIA [facility]                       CPT-4: 01984              2014

 

                                        ([id number]) OFFICE/OUTPATIENT VISIT EST

Diagnosis: HYPERTENSION[ICD9: 401.9]

Diagnosis: MALAISE AND FATIGUE[ICD9: 780.79]

Diagnosis: SINUSITIS, ACUTE[ICD9: 461.9] Akanksha DRIVER [facility]            CPT-4: 07904              2014

 

                                        (64935) OFFICE/OUTPATIENT VISIT EST

Diagnosis: HYPERLIPIDEMIA NEC/NOS[ICD9: 272.4]

Diagnosis: HYPERTENSION[ICD9: 401.9]

Diagnosis: B12 DEFIC ANEMIA NEC[ICD9: 281.1]

Diagnosis: HYPOGLYCEMIA[ICD9: 251.2] Akanksha Villa MEJIA [facility]                       CPT-4: 76520              2014

 

                                        OFFICE/OUTPATIENT VISIT EST

Diagnosis: COUGH[ICD9: 786.2] Bertha DRIVER [facility] 

CPT-4: 53503                            2014

 

                                        OFFICE/OUTPATIENT VISIT EST

Diagnosis: COUGH[ICD9: 786.2]

Diagnosis: URI, ACUTE[ICD9: 465.9] Bertha KEY [facility]                       CPT-4: 04467              10/15/2013

 

                                        (85897) OFFICE/OUTPATIENT VISIT EST

Diagnosis: HYPERTENSION[ICD9: 401.9]

Diagnosis: CEPHALGIA[ICD9: 784.0]

Diagnosis: ANXIETY STATE NOS[ICD9: 300.00]

Diagnosis: FLU VACCINE[ICD9: V04.81] [facility]                       CPT-4: 94579              2013

 

                                        (00465) OFFICE/OUTPATIENT VISIT EST

Diagnosis: DIZZINESS/VERTIGO[ICD9: 780.4]

Diagnosis: SINUSITIS, ACUTE[ICD9: 461.9]

Diagnosis: Benign positional vertigo[ICD9: 386.11] Akankshatammy Driver        DORITA IZQUIERDOTAMMY

OLIVIA DRIVER [facility]         CPT-4: 75006              2013

 

                                        OFFICE/OUTPATIENT VISIT EST

Diagnosis: OTITIS MEDIA NOS[ICD9: 382.9] Akanksha Xiangndangela DRIVER [facility]            CPT-4: 87310              2013

 

                                        OFFICE/OUTPATIENT VISIT EST

Diagnosis: Perforation of ear drum[ICD9: 384.20]

Diagnosis: SINUSITIS, ACUTE[ICD9: 461.9] Akanksha Driver        [facility]            CPT-4: 11127              05/10/2013

 

                                        (48247) OFFICE/OUTPATIENT VISIT EST

Diagnosis: Mastalgia[ICD9: 611.71] Akanksha KEY [facility]                       CPT-4: 13139              2013

 

                                        ([id number]) OFFICE/OUTPATIENT VISIT EST

Diagnosis: HYPERLIPIDEMIA NEC/NOS[ICD9: 272.4]

Diagnosis: HYPERTENSION[ICD9: 401.9]

Diagnosis: DIZZINESS/VERTIGO[ICD9: 780.4]

Diagnosis: Hyperglycemia[ICD9: 790.29]

Diagnosis: MALAISE AND FATIGUE[ICD9: 780.79] Akanksha Otoolerodo        NYA

E SAramis 

VILLA [facility]            CPT-4: 48142              2012

 

                                        ([id number]) OFFICE/OUTPATIENT VISIT EST

Diagnosis: EUSTACHIAN TUBE DYSFUNCTION[ICD9: 381.81]

Diagnosis: DIZZINESS/VERTIGO[ICD9: 780.4]

Diagnosis: ABDOMINAL PAIN[ICD9: 789.00]

Diagnosis: GERD[ICD9: 530.81]

Diagnosis: CERUMEN IMPACTION[ICD9: 380.4] Akanksha Otoolendangela HIGHTOWERLINE S

Aramis 

VILLA [facility]            CPT-4: 43752              2012

 

                                        OFFICE/OUTPATIENT VISIT EST

Diagnosis: ABDOMINAL PAIN[ICD9: 789.00]

Diagnosis: DYSPEPSIA[ICD9: 536.8] Akanksha HIGHTOWERLINE SAramis GUERRERO

CASI [facility]                       CPT-4: 54308              10/23/2012

 

                                        ([id number]) OFFICE/OUTPATIENT VISIT EST

Diagnosis: ABDOMINAL PAIN[ICD9: 789.00]

Diagnosis: DYSPEPSIA[ICD9: 536.8]

Diagnosis: IBS[ICD9: 564.1] Akanksha Villa KEVINQUELINE SAramis VILLA [facility] CPT

-

4: 85548                                10/10/2012

 

                                        OFFICE/OUTPATIENT VISIT EST

Diagnosis: DIZZINESS/VERTIGO[ICD9: 780.4]

Diagnosis: HYPOGLYCEMIA[ICD9: 251.2] Akanksha Xiangrodo BAUMAN SAramis XIANG MEJIA [facility]                       CPT-4: 36038              2012

 

                                        (76724) OFFICE/OUTPATIENT VISIT EST

Diagnosis: URINARY TRACT INFECTION[ICD9: 599.0] Akanksha Orender        KATJA

LINE SAramis

VILLA [facility]            CPT-4: 10775              2012

 

                                        ([id number]) OFFICE/OUTPATIENT VISIT EST

Diagnosis: HYPERLIPIDEMIA NEC/NOS[ICD9: 272.4]

Diagnosis: HYPERTENSION[ICD9: 401.9]

Diagnosis: MALAISE AND FATIGUE[ICD9: 780.79]

Diagnosis: DIZZINESS/VERTIGO[ICD9: 780.4] Akanksha DRIVER [employer]            CPT-4: 88211              2012

 

                                        (63425) OFFICE/OUTPATIENT VISIT EST

Diagnosis: DIZZINESS/VERTIGO[ICD9: 780.4]

Diagnosis: MALAISE AND FATIGUE[ICD9: 780.79]

Diagnosis: HYPERTENSION[ICD9: 401.9] Akanksha MEJIA [employer]

[facility]                       CPT-4: 69590              2012

 

                                        OFFICE/OUTPATIENT VISIT EST

Diagnosis: LUMB/LUMBOSAC DISC DEGEN[ICD9: 722.52]

Diagnosis: Radiculopathy of leg[ICD9: 724.4]

Diagnosis: SACROILIITIS NEC[ICD9: 720.2]

Diagnosis: SPINAL ENTHESOPATHY[ICD9: 720.1] Akanksha DRIVER [employer] [facility]            CPT-4: 46991              2012

 

                                        (82980) OFFICE/OUTPATIENT VISIT EST

Diagnosis: PAIN, LOWER BACK[ICD9: 724.2]

Diagnosis: SPASM OF MUSCLE[ICD9: 728.85]

Diagnosis: SCIATICA[ICD9: 724.3]

Diagnosis: Lumbar degenerative disc disease[ICD9: 722.52] Akanksha DRIVER [employer] [facility] CPT-4: 53366              2012

 

                                        (69781) OFFICE/OUTPATIENT VISIT EST

Diagnosis: PAIN IN THORACIC SPINE[ICD9: 724.1]

Diagnosis: SPASM OF MUSCLE[ICD9: 728.85] Akanksha DRIVER [employer] [facility]            CPT-4: 62849              2012

 

                                        (02569) OFFICE/OUTPATIENT VISIT EST

Diagnosis: PAIN, LOWER BACK[ICD9: 724.2]

Diagnosis: SPASM OF MUSCLE[ICD9: 728.85]

Diagnosis: Sacroiliac dysfunction[ICD9: 739.4]

Diagnosis: Lumbar degenerative disc disease[ICD9: 722.52] Akanksha OTOOLENDER  [facility] CPT-4: 46342              2012

 

                                        OFFICE/OUTPATIENT VISIT EST

Diagnosis: MALAISE AND FATIGUE[ICD9: 780.79]

Diagnosis: HYPOTENSION[ICD9: 458.9]

Diagnosis: CAD[ICD9: 414.00] Akanksha SPENCERER  [facility] 

CPT-4: 50660                            2012

 

                                        OFFICE/OUTPATIENT VISIT EST

Diagnosis: SINUSITIS, ACUTE[ICD9: 461.9]

Diagnosis: PHARYNGITIS, ACUTE[ICD9: 462]

Diagnosis: CERUMEN IMPACTION[ICD9: 380.4] Akanksha [facility]            CPT-4: 81952              2011

 

                                        OFFICE/OUTPATIENT VISIT EST

Diagnosis: PAIN IN THORACIC SPINE[ICD9: 724.1]

Diagnosis: GERD[ICD9: 530.81]

Diagnosis: DIZZINESS/VERTIGO[ICD9: 780.4] Akanksha [facility]            CPT-4: 65563              2011

 

                OFFICE/OUTPATIENT VISIT EST Akanksha OTOOLE

NDER DO [facility] CPT-

4: 96415                                2011

 

                    ([id number]) OFFICE/OUTPATIENT VISIT EST Akanksha CASTILLO SAramis ORENDER DO 

[facility]                       CPT-4: 10784              2011

 

                                        (26566) OFFICE/OUTPATIENT VISIT, EST

Diagnosis: PAIN, LOWER BACK[ICD9: 724.2] Akanksha Villa BAUMAN SAramis

 

[facility]            CPT-4: 72630              2011

 

                    (00566) OFFICE/OUTPATIENT VISIT, EST Akanksha IZQUIERDOTAMMY SAramis ORENDER DO

[facility]                       CPT-4: 83753              2011

 

                    ([id number]) OFFICE/OUTPATIENT VISIT, EST Akanksha Driver  DORITA

RJ SAramis ORENDER DO

[facility]                       CPT-4: 69384              2010

 

                    (48393) OFFICE/OUTPATIENT VISIT, EST Akanksharj Otoolendangela  DORITA

FELECIATAMMY S. ORENDER DO

[facility]                       CPT-4: 64710              2010

 

                    (25620) OFFICE/OUTPATIENT VISIT, JED BAKER [facility]

[facility]                       CPT-4: 11992              2010

 

                    (70469) OFFICE/OUTPATIENT VISIT, JED DE LA ROSA SAramis ORENDER DO

[facility]                       CPT-4: 09412              2010

 

                    (48423) OFFICE/OUTPATIENT VISIT, JED DE LA ROSA SAramis [facility]END[facility] DO

[facility]                       CPT-4: 66955              2010

 

                    ([id number]) OFFICE/OUTPATIENT VISIT, JED BAKER ORENDER DO

[facility]                       CPT-4: 65977              2010







Plan of Care





             Planned Activity Notes        Codes        Status       Date

 

                                        Appointment: Akanksha Dirver

WPtel:[phone]

                                        [address] [place] [address]                                              LAB             2019

 

                                        Appointment: Akanksha Driver

WPtel:[phone]

                                        [address] [place] [address]

[place]                                              INJECTION       10/22/2019

 

             Patient Education: INFLUENZA VACCINE CDC                           

Completed    10/22/2019

 

                                        Appointment: Akanksha Driver

WPtel:[phone]

                                        [address] [place] [address]                                              LAB             10/11/2019

 

                          Visit Diagnosis Plan: Acute serous otitis media, left 

ear Discussion: instructed

to use flonase and claritin daily for symptom relief. discussed with patient 
that if no improvement in 2 weeks, will need to follow up with ENT for audiology
exam. instructed to call office with any other symptoms such as otalgia, 
dysphagia, fever, etc.

                                        ICD-9 : 381.01

ICD-10 : H65.02

                                                    2019

 

                                        Appointment: Nat Lozoya

                                        [address] [place] [address]                                              ACUTE ILLNESS   2019

 

                          Visit Diagnosis Plan: Other fatigue Discussion: Check 

CBC, TSH

                                        ICD-9 : 780.79

ICD-10 : R53.83

                                                    06/10/2019

 

                          Visit Diagnosis Plan: Hypoglycemia, unspecified Discus

madeleine: Monitor BS At least 

6 small meals a day each with protein

                                        ICD-9 : 251.2

ICD-10 : E16.2

                                                    06/10/2019

 

                          Visit Diagnosis Plan: Essential (primary) hypertension

 Discussion: Stable Check 

CMP

                                        ICD-9 : 401.9

ICD-10 : I10

                                                    06/10/2019

 

                          Visit Diagnosis Plan: Urinary tract infection, site no

t specified Discussion: 

Started an old abx prescription

                                        ICD-9 : 599.0

ICD-10 : N39.0

                                                    06/10/2019

 

                                        Appointment: Akanksha Drivertel:[phone]

                                        [address] [place], [place] [id number]

[place]                                              FOLLOW UP       06/10/2019

 

                          Visit Diagnosis Plan: Urinary tract infection, site no

t specified Discussion: 

Macrobid 100mg po BID for 1 week

                                        ICD-9 : 599.0

ICD-10 : N39.0

                                                    2019

 

                          Visit Diagnosis Plan: Gastro-esophageal reflux disease

 without esophagitis 

Discussion: Stable on omeprazole

Follow Up: 3 months

                                        ICD-9 : 530.81

ICD-10 : K21.9

                                                    2019

 

                          Visit Diagnosis Plan: Essential (primary) hypertension

 Discussion: Stable Sees 

Dr. Clements this month

                                        ICD-9 : 401.9

ICD-10 : I10

                                                    2019

 

                                        Appointment: Akanksha Driver

WPtel:[phone]

                                        [address] [place], [place] [address]

[place]                                              FOLLOW UP       2019

 

                          Patient Education: metoprolol tartrate- [employer] [facility] [id number] 

[email or web address]/[email or web address]/resources/[email or web address]

                                        Completed           2019

 

                                        Appointment: Akanksha Driver

WPtel:[phone]

                                        [address] [place], [place] [address]

[place]                                              CANCELED        02/15/2019

 

                          Visit Diagnosis Plan: Gastro-esophageal reflux disease

 without esophagitis 

Discussion: Continue protonix and carafate Proceed with updated EGD

                                        ICD-9 : 530.81

ICD-10 : K21.9

                                                    2018

 

                          Visit Diagnosis Plan: Epigastric pain Discussion: [facility] CT abdomen/pelvis due to

ongoing abdominal pain

                                        ICD-9 : 789.06

ICD-10 : R10.13

                                                    2018

 

                                        Appointment: Akanksha Driver

WPtel:[phone] [address] [facility] [place]

[place]                                              FOLLOW UP       2018

 

                    Care Plan: CT PELVIS W/O DYE                     LOINC : 361

08-9

                          Pending                   2018

 

                    Care Plan: CT ABDOMEN W/O DYE                     LOINC : 36

103-0

                          Pending                   2018

 

                    Care Plan: Referral Order                     SNOMED-CT : 30

[id number]

                          Pending                   2018

 

                                        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

art disease of native coronary artery 

without angina pectoris                 Discussion: S/P cardiac cath--doing medi

vicki management 

with imdur and metoprolol increased Has fwup with cardiology next week Discussed
cardiac rehab

                                        ICD-9 : 414.00

ICD-10 : I25.10

                                                    2018

 

                          Visit Diagnosis Plan: Generalized anxiety disorder Dis

cussion: Stable

                                        ICD-9 : 300.00

ICD-10 : F41.1

                                                    2018

 

                          Visit Diagnosis Plan: Gastro-esophageal reflux disease

 without esophagitis 

Discussion: Continue protonix at BID dosing and carafate BID

Follow Up: 2 months

                                        ICD-9 : 530.81

ICD-10 : K21.9

                                                    2018

 

                          Visit Diagnosis Plan: Essential (primary) hypertension

 Discussion: Stable

                                        ICD-9 : 401.9

ICD-10 : I10

                                                    2018

 

                                        Appointment: Akanksha Driver

WPtel:[phone]

                                        [facility] [place]

[place]                                              FOLLOW UP       2018

 

                          Visit Diagnosis Plan: Gastro-esophageal reflux disease

 without esophagitis 

Discussion: Continue protonix at BID dosing Continue carafate at q AC and HS 
dosing for 2 more weeks then decrease to BID dosing

Follow Up: 4 weeks

                                        ICD-9 : 530.81

ICD-10 : K21.9

                                                    10/02/2018

 

                          Visit Diagnosis Plan: Urinary tract infection, site no

t specified Discussion: 

Reculture urine

                                        ICD-9 : 599.0

ICD-10 : N39.0

                                                    10/02/2018

 

                          Visit Diagnosis Plan: Generalized anxiety disorder Dis

cussion: Increase xanax to

BID dosing especially with upcoming cardiac testing which is already making 
patient more anxious and worried

                                        ICD-9 : 300.00

ICD-10 : F41.1

                                                    10/02/2018

 

                          Visit Diagnosis Plan: Palpitations Discussion: Bobby walter going to schedule 

Lexiscan

                                        ICD-9 : 785.1

ICD-10 : R00.2

                                                    10/02/2018

 

                                        Appointment: Akanksha Driver

WPtel:[phone]

                                        [address] [place] [address]                                              FOLLOW UP       10/02/2018

 

             Patient Education: Patient Medication Summary                      

     Completed    10/02/2018

 

                                        Appointment: Akanksha Driver

WPtel:[phone]

                                        [address] [place] [address]                                              LAB             2018

 

             Patient Education: Patient Medication Summary                      

     Completed    2018

 

                          Visit Diagnosis Plan: Epigastric pain Discussion: Cont

inue protonix and carafate

but make into a slurry Flu shot given Discussed EGD if symptoms persist

Follow Up: 2 weeks

                                        ICD-9 : 789.06

ICD-10 : R10.13

                                                    2018

 

                          Visit Diagnosis Plan: Generalized anxiety disorder Dis

cussion: Did discuss 

increased risk of benzodiazepines causing dementia but at this time will stay at
xanax 0.25mg po BID

                                        ICD-9 : 300.00

ICD-10 : F41.1

                                                    2018

 

                                        Appointment: Akanksha Driver

WPtel:[phone]

                                        [address] [place] [address]                                              FOLLOW UP       2018

 

             Patient Education: Patient Medication Summary                      

     Completed    2018

 

                          Visit Diagnosis Plan: Essential (primary) hypertension

 Discussion: Has 

hydralazine to use prn per cardiology Going to do 30 day holter monitor

                                        ICD-9 : 401.9

ICD-10 : I10

                                                    2018

 

                          Visit Diagnosis Plan: Hypoglycemia, unspecified Discus

madeleine: 6 small meals a 

day--each with protein Increase fluid intake

                                        ICD-9 : 251.2

ICD-10 : E16.2

                                                    2018

 

                          Visit Diagnosis Plan: Gastro-esophageal reflux disease

 without esophagitis 

Discussion: Change to protonix 40mg po BID

Follow Up: 1 months

                                        ICD-9 : 530.81

ICD-10 : K21.9

                                                    2018

 

                                        Appointment: Akanksha Driver

WPtel:[phone]

                                        [address] [place] [address]                                              ACUTE ILLNESS   2018

 

             Patient Education: Patient Medication Summary                      

     Completed    2018

 

                          Visit Diagnosis Plan: Dizziness and giddiness Discussi

on: blood work to be 

completed in office including cmp, cbc, troponin to rule out glucose 
intolerance, infection, dehydration. instructed patient that she needs to see 
her cardiologist sooner than oct and patient requested we contact dr. clements's 
office. will have front office make appt. patient has carotid doppler annually.

                                        ICD-9 : 780.4

ICD-10 : R42

                                                    2018

 

                                        Appointment: Nat Lozoya

                                        [address] [place] [address]                                              ACUTE ILLNESS   2018

 

             Patient Education: Patient Medication Summary                      

     Completed    2018

 

                          Visit Diagnosis Plan: Cervicalgia Discussion: Complete

 course of PT since 

symptoms improved Continue stretching exercises Notify if symptoms return or 
worsen

                                        ICD-9 : 723.1

ICD-10 : M54.2

                                                    2018

 

                                        Appointment: Akanksha Driver

WPtel:[phone]

                                        [address] [place], [place] [id number]                                              FOLLOW UP       2018

 

             Patient Education: Patient Medication Summary                      

     Completed    2018

 

                          Visit Diagnosis Plan: Hypoglycemia, unspecified Discus

madeleine: Eat something with 

protein every 2 hrs

                                        ICD-9 : 251.2

ICD-10 : E16.2

                                                    2018

 

                          Visit Diagnosis Plan: Other spondylosis with radiculop

athy, cervical region 

Discussion: Check MRI of cervical spine

                                        ICD-9 : 721.0

ICD-10 : M47.22

                                                    2018

 

                          Visit Diagnosis Plan: Vertigo of central origin, bilat

eral Discussion: Discussed

PT for vestibular therapy

                                        ICD-9 : 386.2

ICD-10 : H81.43

                                                    2018

 

                                        Appointment: Akanksha Driver

WPtel:[phone]

                                        [facility]

                                                              2018

 

             Patient Education: Patient Medication Summary                      

     Completed    2018

 

                    Care Plan: MRI NECK SPINE W/O DYE                     LOINC 

: [id number]

                          Pending                   2018

 

                          Visit Diagnosis Plan: Otalgia, bilateral Discussion: k

enalog 40 mg given to 

patient im. instructed patient to monitor blood sugar at home due to risk of 
increased sugar. instructed patient to rtc on wednesday if no improvement and 
may require antibiotic.

                                        ICD-9 : 388.70

ICD-10 : H92.03

                                                    2018

 

                          Visit Diagnosis Plan: Benign paroxysmal vertigo, bilat

eral Discussion: patient 

has meclizine at home but states it makes her too tired. instructed patient to 
cut in half and take at night. if it doesn't cause too much drowsiness, 
instructed to take bid until feeling better. instructed to rtc wed if no 
improvement or worsening symptoms. instructed patient to increase fluid intake 
as well.

                                        ICD-9 : 386.11

ICD-10 : H81.13

                                                    2018

 

                                        Appointment: Nat Lozoya

                                        [address] [place] [address]                                              ACUTE ILLNESS   2018

 

             Patient Education: Patient Medication Summary                      

     Completed    2018

 

                          Visit Diagnosis Plan: Left lower quadrant pain Discuss

ion: Culture urine Notify 

if worsens

                                        ICD-9 : 789.04

ICD-10 : R10.32

                                                    2018

 

                          Visit Diagnosis Plan: Low back pain Discussion: Stretc

hes Topical aspercreme 

Tylenol 1 po TID

                                        ICD-9 : 724.2

ICD-10 : M54.5

                                                    2018

 

                          Visit Diagnosis Plan: Radiculopathy, lumbosacral regio

n Discussion: As above

                                        ICD-9 : 724.4

ICD-10 : M54.17

                                                    2018

 

                                        Appointment: Akanksha Driver

WPtel:[phone]

                                        [address] [place] [address]                                              ACUTE ILLNESS   2018

 

             Patient Education: Patient Medication Summary                      

     Completed    2018

 

                                        Appointment: Akanksha Driver

WPtel:[phone]

                                        [address] [place] [address]

[place]                                              INJECTION       10/06/2017

 

             Patient Education: Patient Medication Summary                      

     Completed    10/06/2017

 

                                        Appointment: Akanksha Driver

WPtel:[phone]

                                        [address] [place] [address]

[place]                                              LAB             2017

 

             Patient Education: Patient Medication Summary                      

     Completed    2017

 

                          Visit Diagnosis Plan: Pain in thoracic spine Discussio

n: PT has helped Continue 

stretches and walking [facility]

                                        ICD-9 : 724.1

ICD-10 : M54.6

                                                    2017

 

                          Visit Diagnosis Plan: Other intervertebral disc degene

ration, lumbar region 

Follow Up: 3 months

                                        ICD-9 : 722.52

ICD-10 : M51.36

                                                    2017

 

                                        Appointment: Akanksha Driver

WPtel:[phone]

                                        [address] [place], [place] [id number]

[place]                                              FOLLOW UP       2017

 

             Patient Education: Patient Medication Summary                      

     Completed    2017

 

                          Visit Diagnosis Plan: Low back pain Discussion: Start 

PT for low back- Seems 

like abdominal pain is radiating from low back

Follow Up: 5 weeks

                                        ICD-9 : 724.2

ICD-10 : M54.5

                                                    2017

 

                          Visit Diagnosis Plan: Left lower quadrant pain Discuss

ion: Had CT scan of 

abdomen/pelvis in 2017 and normal colonoscopy in 2015

                                        ICD-9 : 789.04

ICD-10 : R10.32

                                                    2017

 

                                        Appointment: Akanksha Driver

WPtel:[phone]

                                        [address] [place], [place] [id number]                                              ACUTE ILLNESS   2017

 

             Patient Education: Patient Medication Summary                      

     Completed    2017

 

                                        Appointment: Akanksha Driver

WPtel:[phone]

                                        [address] [place] [address]

[place]                                              LAB             2017

 

             Patient Education: Patient Medication Summary                      

     Completed    2017

 

                          Visit Diagnosis Plan: Mixed hyperlipidemia Discussion:

 Check lipids

                                        ICD-9 : 272.4

ICD-10 : E78.2

                                                    2017

 

                          Visit Diagnosis Plan: Impaired fasting glucose Discuss

ion: Return in AM for CMP,

HbA1C Accuchecks daily Diet/Exercise discussed at length

                                        ICD-9 : 790.29

ICD-10 : R73.01

                                                    2017

 

                          Visit Diagnosis Plan: Other fatigue Discussion: Check 

CBC, TSH

                                        ICD-9 : 780.79

ICD-10 : R53.83

                                                    2017

 

                          Visit Diagnosis Plan: Mastodynia Discussion: Check Jace

ateral diagnostic 

mammogram with US

                                        ICD-9 : 611.71

ICD-10 : N64.4

                                                    2017

 

                                        Appointment: Akanksha Driver

WPtel:[phone]

                                        [address] [place], [place] [id number]              3/7 confirmed `sl                 ACUTE ILLNESS   2017

 

             Patient Education: Patient Medication Summary                      

     Completed    2017

 

                    Care Plan: MAMMOGRAM SCREENING                     LOINC : 2

6347-5

                          Pending                   2017

 

                          Visit Diagnosis Plan: Acute upper respiratory infectio

n, unspecified Discussion:

Exam is reassuring Likely viral illness Supportive care reviewed and encouraged 
Follow up PRN

                                        ICD-9 : 465.9

ICD-10 : J06.9

                                                    2017

 

                                        Appointment: Luba Stevenson 

                                        [address] [place] [address]                                              ACUTE ILLNESS   2017

 

             Patient Education: Patient Medication Summary                      

     Completed    2017

 

                          Visit Plan:               Supportive care. Rest, Fluid

s, Tylenol/Motrin prn fever or 

bodyaches. Notify if worsening symptoms.

                                                            2016

 

                                        Appointment: Akanksha Driver

WPtel:[phone]

                                        [address] [place] [address]                                              ACUTE ILLNESS   2016

 

             Patient Education: Patient Medication Summary                      

     Completed    2016

 

                                        Appointment: Akanksha Driver

WPtel:[phone]

                                        [address] [place], [place] [id number]

[place]                                              INJECTION       10/07/2016

 

             Patient Education: Patient Medication Summary                      

     Completed    10/07/2016

 

                                        Appointment: Akanksha Drivertel:[phone]

                                        [address] [place], [place] [id number]

[place]                                              LAB             2016

 

             Patient Education: Patient Medication Summary                      

     Completed    2016

 

                          Visit Plan:               Add miralax daily Use daily 

probiotic and metamucil and push fluids

Notify if worsens/persists

                                                            2016

 

                                        Appointment: Akanksha Driver

WPtel:[phone]

                                        [address] [place], [place] [id number]              16 confirmed ~sl                 ACUTE ILLNESS   2016

 

             Patient Education: Patient Medication Summary                      

     Completed    2016

 

                          Visit Plan:               No NSAIDs Kidney function di

scussed Discussed hydration Monitor lab

every 4months so will check CMP, HbA1C next month

                                                            2016

 

                                        Appointment: Akanksha Driver

WPtel:[phone]

                                        [address] [place], [place] [address]              03/15 confirmed ~sl                 ACUTE ILLNESS   2016

 

             Patient Education: Patient Medication Summary                      

     Completed    2016

 

                          Visit Plan:               Vestibular exercises Refill 

flonase Strict low Na diet--discussed 

that needs to read labels Rx for walker with chair given due to muscle 
weakness/unsteadiness Has carotids and abdominal aorta and legs checked in 
January Check Chem 7

                                                            2015

 

                                        Appointment: Akanksha Driver

WPtel:[phone]

                                        [address] [place], [place] [id number]              12/15/15 appt confirmed cn                 FOLLOW UP       

 

             Patient Education: Patient Medication Summary                      

     Completed    2015

 

             Patient Education: Vertigo                           Completed    1

2015

 

                                        Appointment: Akanksha Driver

WPtel:[phone]

                                        [address] [place], [place] [id number]

[place]                                              INJECTION       10/16/2015

 

             Patient Education: Patient Medication Summary                      

     Completed    10/16/2015

 

                                        Appointment: Akanksha Driver

WPtel:[phone]

                                        [address] [place], [place] [address]                                              UA              09/15/2015

 

             Patient Education: Patient Medication Summary                      

     Completed    09/15/2015

 

                                        Referral: Landon De La Torre

WPtel:[phone]

#1 [facility]

[address]              Referral                        Completed       2015

 

                                        Appointment: Akanksha Driver

WPtel:[phone]

                                        [address] [place] [address]                                              LAB             09/10/2015

 

             Patient Education: Patient Medication Summary                      

     Completed    09/10/2015

 

                          Visit Plan:               Check CMP, CBC, TSH, Free T4

, B12, Lipids, HbA1C Discussed 6 small 

meals a day each with protein Would likely benefit from antidepressant Update 
colonoscopy

                                                            2015

 

                                        Appointment: Akanksha Driver

WPtel:[phone]

                                        [address] [place], [place] [id number]

              9/8/15 confirmed                 ACUTE ILLNESS   2015

 

             Patient Education: Patient Medication Summary                      

     Completed    2015

 

                          Visit Plan:               Cerumen flush with warm wate

r and peroxide - good results Resume 

Nasonex nasal spray daily Recommended Debrox earwax removal drops

                                                            2015

 

                                        Appointment: Bertha Mon

WPtel:[phone]

                                        [address] [place] [address]                                              ACUTE ILLNESS   2015

 

             Patient Education: Patient Medication Summary                      

     Completed    2015

 

                          Visit Plan:               Continue aspirin daily Add m

eloxicam for 1week Elevate and Ice Call

on 2015

 

                                        Appointment: Akanksha Driver

WPtel:[phone]

                                        [address] [place] [address]                                              ACUTE ILLNESS   2015

 

             Patient Education: Patient Medication Summary                      

     Completed    2015

 

                          Visit Plan:               Been using baclofen at [facility] and has helped some PT for next 

2-4weeks

                                                            2015

 

                                        Appointment: Akanksha Driver

WPtel:[phone]

                                        [address] [place] [address]                                              Hospital Follow Up 2015

 

             Patient Education: Patient Medication Summary                      

     Completed    2015

 

                                        Appointment: Akanksha Driver

WPtel:[phone]

                                        [address] [place] [address]                                              LAB             2015

 

                                        Appointment: Akanksha Driver

WPtel:[phone]

                                        [address] [place] [address]              feeling better, weather -                 FOLLOW UP       

 

                                        Referral: Landon De La Torre

WPtel:[phone]

1 [facility] [address]              Referral                        Appointment Requested 2015

 

                          Visit Plan:               Continue exercise and curren

t meds See surgery for removal of right

arm lesion

                                                            2015

 

                                        Appointment: Akanksha Driver

WPtel:[phone]

                                        [address] [place] [address]                                              FOLLOW UP       2015

 

             Patient Education: Patient Medication Summary                      

     Completed    2015

 

                                        Appointment: Akanksha Driver

WPtel:[phone]

                                        [address]

[id number]

[place]                                              INJECTION       10/17/2014

 

             Patient Education: Patient Medication Summary                      

     Completed    10/17/2014

 

                                        Appointment: Bertha Mon

WPtel:[phone]

                                        [address] [place] [address]                                              ACUTE ILLNESS   2014

 

             Patient Education: Patient Medication Summary                      

     Completed    2014

 

                          Visit Plan:               Discussed that likely lumbar

 etiology for leg weakness Will change 

amlodopine to low dose dyazide and see if helps legs and inner ear

                                                            2014

 

                                        Appointment: Akanksha Driver

WPtel:[phone]

                                        [address] [place] [address]                                              FOLLOW UP       2014

 

             Patient Education: Patient Medication Summary                      

     Completed    2014

 

                          Visit Plan:               Ceftin and continue claritin

/flonase Decrease amlodopine to 2.5mg q

HS until fwup with Card due to weakness

                                                            2014

 

                                        Appointment: Akanksha Driver

WPtel:[phone]

                                        [address] [place] [address]                                              ACUTE ILLNESS   2014

 

             Patient Education: Patient Medication Summary                      

     Completed    2014

 

                                        Appointment: Akanksha Driver

WPtel:[phone]

                                        [address] [place] [address]                                              LAB             2014

 

             Patient Education: Patient Medication Summary                      

     Completed    2014

 

                                        Appointment: Bertha Mon

WPtel:[phone]

                                        [address] [place] [address]                                              ACUTE ILLNESS   2014

 

             Patient Education: Patient Medication Summary                      

     Completed    2014

 

                          Visit Plan:               Supportive care. Rest, Fluid

s, Tylenol prn fever or bodyaches. 

Notify if worsening symptoms. Ceftin

                                                            10/15/2013

 

                                        Appointment: Bertha Mon

WPtel:[phone]

                                        [address] [place] [address]                                              ACUTE ILLNESS   10/15/2013

 

             Patient Education: Patient Medication Summary                      

     Completed    10/15/2013

 

                                        Appointment: Akanksha Driver

WPtel:[phone]

                                        [address] [place] [address] CHECK        2013

 

             Patient Education: Patient Medication Summary                      

     Completed    2013

 

                                        Appointment: Akanksha Driver

WPtel:[phone]

                                        [address] [place], [place] [id number]                                              ER Follow UP    2013

 

             Patient Education: Patient Medication Summary                      

     Completed    2013

 

                          Visit Plan:               Restart flonase BID Use mecl

izine 25mg q HS Vestibular exercises 

Claritin 10mg q AM See ENT if doesn't resolve

                                                            2013

 

                                        Appointment: Akanksha Driver

WPtel:[phone]

                                        [address] [place], [place] [id number]                                              ACUTE ILLNESS   2013

 

             Patient Education: Patient Medication Summary                      

     Completed    2013

 

                          Visit Plan:               Will continue to observe

                                                            2013

 

                                        Appointment: Akanksha Driver

WPtel:[phone]

                                        [address] [place], [place] [id number]                                              FOLLOW UP       2013

 

             Patient Education: Patient Medication Summary                      

     Completed    2013

 

                          Visit Plan:               ERx for Augmentin 875mg q12 

x 10 days and Floxin otic drops 5 gtts 

AD x7days Sample of Nasonex per pt request Discussed treatment (nasal saline, 
salt water gargles, keeping right ear clean and dry, for worsening go to UC on 
weekend, Tylenol/ibuprofen, etc.) RTC 2 weeks for ear recheck.

                                                            05/10/2013

 

                                        Appointment: Jill Jean 

WPtel:[phone]

                                        [address] [place] [address] [place], [place] [id number]                                              ACUTE ILLNESS   05/10/2013

 

             Patient Education: Patient Medication Summary                      

     Completed    05/10/2013

 

                          Visit Plan:               Decrease caffeine intake Marina

ck Bilateral Mammogram with US of left 

breast

                                                            2013

 

                                        Appointment: Akanksha Driver

WPtel:[phone]

                                        [address] [place], [place] [id number]                                              ACUTE ILLNESS   2013

 

             Patient Education: Patient Medication Summary                      

     Completed    2013

 

                                        Appointment: Kate Hall

WPtel:[phone]

                                        [address] [place] [address] [place], [place] [id number]              appt scheduled                  ACUTE ILLNESS   2013

 

                                        Appointment: Akanksha Driver

WPtel:[phone]

                                        [address] [place], [place] [id number]

                                              LAB             2012

 

             Patient Education: Patient Medication Summary                      

     Completed    2012

 

                          Visit Plan:               Continue off carafate and se

e how does Continue probiotic Add 

Vestibular exercises and use meclizine prn Continue Nasonex Check fasting lab 
including CMP, Lipids, CBC, TSH, Free T4, HbA1C

                                                            2012

 

                                        Appointment: Akanksha Driver

WPtel:[phone]

                                        [address] [place], [place] [id number]

                                              FOLLOW UP       2012

 

             Patient Education: Patient Medication Summary                      

     Completed    2012

 

                          Visit Plan:               Continue carafate for 4more 

weeks at current dose then decrease to 

BID for 2wks then q HS

                                                            10/23/2012

 

                                        Appointment: Akanksha Driver

WPtel:[phone]

                                        [address] [place], [place] [id number]

                                              FOLLOW UP       10/23/2012

 

             Patient Education: Patient Medication Summary                      

     Completed    10/23/2012

 

                          Visit Plan:               Continue omeprazole Add Ellyn

fate 1gm po q AC Add daily probiotic

                                                            10/10/2012

 

                                        Appointment: Akanksha Driver

WPtel:[phone]

                                        [address] [place], [place] [id number]

                                              ACUTE ILLNESS   10/10/2012

 

             Patient Education: Patient Medication Summary                      

     Completed    10/10/2012

 

                                        Appointment: Akanksha Driver

WPtel:[phone]

                                        [address] [place], [place] [id number]

US                                              INJECTION       2012

 

             Patient Education: Patient Medication Summary                      

     Completed    2012

 

                          Visit Plan:               Diabetic Diet Accuchecks BID

 alternating times Hold onglyza and 

Januvia for now and continue diet and exercise and weight loss and drew LOREDO 
Discussed hypoglycemia and snack of peanut butter and crackers with juice or 
milk

                                                            2012

 

                                        Appointment: Akanksha Driver

WPtel:[phone]

                                        [address] [place], [place] [id number]

                                              WORK IN         2012

 

             Patient Education: Patient Medication Summary                      

     Completed    2012

 

                                        Appointment: Akanksha Driver

WPtel:[phone]

                                        [address] [place] [address]              2012

 

             Patient Education: Patient Medication Summary                      

     Completed    2012

 

                                        Appointment: Akanksha Driver

WPtel:[phone]

                                        [address] [place] [address]             2012

 

             Patient Education: Patient Medication Summary                      

     Completed    2012

 

                                        Appointment: Akanksha Driver

WPtel:[phone]

                                        [address] [place] [address]                                              ACUTE ILLNESS   2012

 

             Patient Education: Patient Medication Summary                      

     Completed    2012

 

                          Visit Plan:               Injection to Right SI joint 

as above Pt will call in 3 days on pain

Has PT starting in 2wks.

                                                            2012

 

                                        Appointment: Akanksha Driver

WPtel:[phone]

                                        [address] [place] [address]                                              ACUTE ILLNESS   2012

 

             Patient Education: Patient Medication Summary                      

     Completed    2012

 

                          Visit Plan:               OMT done Start PT May need u

pdated MRI if need to consider epidural

Prednisone

                                                            2012

 

                                        Appointment: Akanksha Driver

WPtel:[phone]

                                        [address] [place] [address]                                              OMT             2012

 

             Patient Education: Patient Medication Summary                      

     Completed    2012

 

                          Visit Plan:               Flexeril and Vimovo OMT done

 Daily stretches

                                                            2012

 

                                        Appointment: Akanksha Driver

WPtel:[phone]

                                        [address] [place] [address]                                              ACUTE ILLNESS   2012

 

             Patient Education: Patient Medication Summary                      

     Completed    2012

 

                          Visit Plan:               OMT done Daily stretches Vinod

st heat or biofreeze Right SI joint 

injection Call in 1week Vimovo BID

                                                            2012

 

                                        Appointment: Akanksha Driver

WPtel:[phone]

                                        [address] [place] [address]                                              ACUTE ILLNESS   2012

 

             Patient Education: Patient Medication Summary                      

     Completed    2012

 

                                        Appointment: Akanksha Drivertel:[phone]

                                        [address] [place], [place] [id number]

                                              LAB             2012

 

             Patient Education: Patient Medication Summary                      

     Completed    2012

 

                          Visit Plan:               Decrease amlodopine to 1.25m

g daily Schedule with Cardiology 

Fasting lab in AM

                                                            2012

 

                                        Appointment: Akanksha Driver

WPtel:[phone]

                                        [address] [place], [place] [id number]                                              ACUTE ILLNESS   2012

 

             Patient Education: Patient Medication Summary                      

     Completed    2012

 

                          Visit Plan:               Saline nasal flushes prn. Ty

lenol/Motrin prn headache. Notify if 

persists/symptoms worsening. Cerumen removal from ears as above

                                                            2011

 

                                        Appointment: Akanksha Driver

WPtel:[phone]

                                        [address] [place] [address]                                              ACUTE ILLNESS   2011

 

             Patient Education: Patient Medication Summary                      

     Completed    2011

 

                                        Appointment: Akanksha Driver

WPtel:[phone]

                                        [address] [place] [address]

[place]                                              INJECTION       10/05/2011

 

             Patient Education: Patient Medication Summary                      

     Completed    10/05/2011

 

                          Visit Plan:               Continue vimovo for 1more we

ek Increase Omeprazole to BID for 1mo 

then resume QD if stomach improved Vestibular exercises with meclizine q HS for 
next week

                                                            2011

 

                                        Appointment: Akanksha Driver

WPtel:[phone]

                                        [address] [place], [place] [id number]

                                              FOLLOW UP       2011

 

             Patient Education: Patient Medication Summary                      

     Completed    2011

 

                                        Appointment: Akanksha Driver

WPtel:[phone]

                                        [address] [place] [address]                                              ACUTE ILLNESS   2011

 

             Patient Education: Patient Medication Summary                      

     Completed    2011

 

                                        Appointment: Akanksha Driver

WPtel:[phone]

                                        [address]                                              LAB             2011

 

             Patient Education: Patient Medication Summary                      

     Completed    2011

 

                          Visit Plan:               Saline nasal flushes prn. Ty

lenol/Motrin prn headache. Notify if 

persists/symptoms worsening. Add Veramyst Increase Omeprazole to 20mg po BID

                                                            2011

 

                                        Appointment: Akanksha Driver

WPtel:[phone]

                                        [address] [place] [address]                                              ACUTE ILLNESS   2011

 

             Patient Education: Patient Medication Summary                      

     Completed    2011

 

                                        Appointment: Akanksha Driver

WPtel:[phone]

                                        [address] [place] [address]                                              FOLLOW UP       2011

 

             Patient Education: Patient Medication Summary                      

     Completed    2011

 

                                        Appointment: Akanksha Driver

WPtel:[phone]

                                        [address] [place] [address]                                              ACUTE ILLNESS   2011

 

             Patient Education: Patient Medication Summary                      

     Completed    2011

 

                          Visit Plan:               Nasocourt sample given. Pt. 

will notify if symptoms are worse on 

Monday.

                                                            2010

 

                                        Appointment: Kate Hall

WPtel:[phone]

                                        [address] [place] [address]                                              ACUTE ILLNESS   2010

 

             Patient Education: Patient Medication Summary                      

     Completed    2010

 

                                        Appointment: Akanksha Driver

WPtel:[phone]

                                        [address] [place], [place] [address]

[place]                                              INJECTION       10/06/2010

 

             Patient Education: Patient Medication Summary                      

     Completed    10/06/2010

 

                                        Appointment: Akanksha Driver

WPtel:[phone]

                                        [address] [place] [address]                                              FOLLOW UP       2010

 

             Patient Education: Patient Medication Summary                      

     Completed    2010

 

             Patient Education: Lexapro                           Completed    0

2010

 

                          Visit Plan:               May proceed with hiatal mykel

ia repair per Card. so will contact Dr. Cash's office to proceed with surgery Trial of Lexapro 5mg QD plus use 
xanax prn

                                                            2010

 

                                        Appointment: Akanksha Driver

WPtel:[phone]

                                        [address] [place] [address]                                              FOLLOW UP       2010

 

             Patient Education: Patient Medication Summary                      

     Completed    2010

 

                          Visit Plan:               B12 given Cont oral B12 and 

iron Fwup 1mo for B12 Proceed with 

hiatal hernia repair once card clearance

                                                            2010

 

                                        Appointment: Akanksha Driver

WPtel:[phone]

                                        [address] [place] [address]                                              FOLLOW UP       2010

 

             Patient Education: Patient Medication Summary                      

     Completed    2010

 

                                        Appointment: Akanksha Driver

WPtel:[phone]

                                        [address] [place] [address]                                              FOLLOW UP       2010

 

             Patient Education: Patient Medication Summary                      

     Completed    2010

 

                                        Appointment: Akanksha Driver

WPtel:[phone]

                                        [address] [place] [address]

[place]                                              LAB             2010

 

             Patient Education: Patient Medication Summary                      

     Completed    2010

 

                          Visit Plan:               Check fasting lab in AM--CMP

,Lipids, CBC, Vit D, TSH,FreeT4, B12

                                                            2010

 

                                        Appointment: Akanksha Driver

WPtel:[phone]

                                        [address] [place] [address]                                              FOLLOW UP       2010

 

             Patient Education: Patient Medication Summary                      

     Completed    2010

 

                                        Referral: Landon De La Torre

WPtel:[phone]

[address] [place] [address]

[place]              Referral                        Appointment Requested  

 

                                        Referral: Landon De La Torre

WPtel:[phone]

[address]              Referral                        Initiated        







Instructions





                                        Comment

 

                                        . Supportive care.  Rest, Fluids, Tyleno

l/Motrin prn fever or bodyaches.  Notify

if worsening symptoms.



 

                                        . Add miralax daily

Use daily probiotic and metamucil and push fluids

Notify if worsens/persists

 

                                        . No NSAIDs

Kidney function discussed

Discussed hydration 

Monitor lab every 4months so will check CMP, HbA1C next month

 

                                        . Vestibular exercises

Refill flonase

Strict low Na diet--discussed that needs to read labels

Rx for walker with chair given due to muscle weakness/unsteadiness

Has carotids and abdominal aorta and legs checked in January

Check Chem 7



 

                                        . Check CMP, CBC, TSH, Free T4, B12, Lip

ids, HbA1C

Discussed 6 small meals a day each with protein

Would likely benefit from antidepressant 

Update colonoscopy

 

                                        . Cerumen flush with warm water and tyler

xide - good results 

Resume Nasonex nasal spray daily

Recommended Debrox earwax removal drops 

 

                                        . Continue aspirin daily

Add meloxicam for 1week

Elevate and Ice

Call on Monday

 

                                        . Been using baclofen at bedtime and has

 helped some

PT for next 2-4weeks



 

                                        . Continue exercise and current meds

See surgery for removal of right arm lesion

 

                                        . Discussed that likely lumbar etiology 

for leg weakness

Will change amlodopine to low dose dyazide and see if helps legs and inner ear

 

                                        . Ceftin and continue claritin/flonase

Decrease amlodopine to 2.5mg q HS until fwup with Card due to weakness

 

                                        .  Supportive care.  Rest, Fluids, Tylen

ol prn fever or bodyaches.  Notify if 

worsening symptoms.

Ceftin

 

                                        . Restart flonase BID

Use meclizine 25mg q HS

Vestibular exercises

Claritin 10mg q AM

See ENT if doesn't resolve

 

                                        . Will continue to observe



 

                                        . ERx for Augmentin 875mg q12 x 10 days 

and Floxin otic drops 5 gtts AD x7days

Sample of Nasonex per pt request

Discussed treatment (nasal saline, salt water gargles, keeping right ear clean 
and dry, for worsening go to UC on weekend, Tylenol/ibuprofen, etc.)

RTC 2 weeks for ear recheck.

 

                                        . Decrease caffeine intake

Check Bilateral Mammogram with US of left breast

 

                                        Left ear flushed with warm water and per

oxide with ear syringe and then last 

removed with currette--peroxide drops instilled.  Tolerated well, no 
complications, TM intact.. Continue off carafate and see how does

Continue probiotic

Add Vestibular exercises and use meclizine prn

Continue Nasonex

Check fasting lab including CMP, Lipids, CBC, TSH, Free T4, HbA1C

 

                                        . Continue carafate for 4more weeks at c

urrent dose then decrease to BID for 

2wks then q HS

 

                                        . Continue omeprazole

Add Carafate 1gm po q AC

Add daily probiotic



 

                                        . Diabetic Diet

Accuchecks BID alternating times

Hold onglyza and Januvia for now and continue diet and exercise and weight loss 
and moniter BS

Discussed hypoglycemia and snack of peanut butter and crackers with juice or 
milk

 

                                        Informed Consent obtainded.  Right SI yasir

int cleansed with alcohol and betadine 

and injected with 3cc 1%l lidocaine with 40mg depomedrol and 40mg kenalog, 
tolerated well with no complications, neosporin and bandage applied. Injection 
to Right SI joint as above

Pt will call in 3 days on pain

Has PT starting in 2wks.

 

                                        . OMT done

Start PT

May need updated MRI if need to consider epidural

Prednisone

 

                                        . Flexeril and Vimovo

OMT done

Daily stretches

 

                                        Right SI joint cleansed with alchohol an

d betadine and injected with 3cc 1% 

lidocaine with 40mg depomedrol and 40mg kenalog, tolerated well with no 
complications, neosporin and bandage applied. OMT done

Daily stretches

Moist heat or biofreeze

Right SI joint injection

Call in 1week

Vimovo BID

 

                                        . Decrease amlodopine to 1.25mg daily

Schedule with Cardiology

Fasting lab in AM

 

                                        .  Saline nasal flushes prn. Tylenol/Mot

rin prn headache.  Notify if 

persists/symptoms worsening.

Cerumen removal from ears as above



 

                                        . Continue vimovo for 1more week

Increase Omeprazole to BID for 1mo then resume QD if stomach improved

Vestibular exercises with meclizine q HS for next week

 

                                        . Saline nasal flushes prn. Tylenol/Motr

in prn headache.  Notify if 

persists/symptoms worsening.

Add Veramyst

Increase Omeprazole to 20mg po BID

 

                                        . Nasocourt sample given.  Pt. will noti

fy if symptoms are worse on Monday. 

 

                                        . May proceed with hiatal hernia repair 

per Card. so will contact Dr. Cash's office to proceed with surgery



Trial of Lexapro 5mg QD plus use xanax prn

 

                                        . B12 given

Cont oral B12 and iron

Fwup 1mo for B12

Proceed with hiatal hernia repair once card clearance

 

                                        . Check fasting lab in AM--CMP,Lipids, C

BC, Vit D, TSH,FreeT4, B12







Medical Equipment

No Medical Equipment data



Health Concerns Section

Health Concerns data not found



Goals Section

Goals data not found



Interventions Section

Interventions data not found



Health Status Evaluations/Outcomes Section

Health Status Evaluations/Outcomes data not found



Advance Directives

No Advance Directive data

## 2020-02-19 NOTE — XMS REPORT
CCD document using C-CDA

                             Created on: 2020



Leilani Ramírez

External Reference #: [id number]

: 1940

Sex: Female



Demographics





                          Address                   [address] [place]  [address]

 

                          Home Phone                [phone]

 

                          Preferred Language        Unknown

 

                          Marital Status            

 

                          Restoration Affiliation     Unknown

 

                          Race                      White

 

                          Ethnic Group              Not  or 





Author





                          Author                    Leilani Driver D.O.

 

                          Organization              [facility]

 

                          Address                   [address] [place]  [address]



 

                          Phone                     [phone]







Care Team Providers





                    Care Team Member Name Role                Phone

 

                    Akanksha Driver D.O. PP                  Unavailable

 

                          CCM                       Unavailable







Summary Purpose

Interface Exchange



Insurance Providers





             Payer name   Policy type / Coverage type Covered party ID Effective

 Begin Date 

Effective End Date

 

             [employer] [place] B [id number]  [id number]   

  Unknown

 

             [employer] Senior Supplemental Medicare Part B [id number]   [id number]    

 Unknown







Family history





Father



                          Diagnosis                 Age At Onset

 

                          Heart disease             Unknown







Mother



                          Diagnosis                 Age At Onset

 

                          Heart disease             Unknown

 

                          Cancer                    Unknown







Social History





                Social History Element Codes           Description     Effective

 Dates

 

                Tobacco history SNOMED CT: 443632156 Never smoker    2011

 

                Marital status  Unknown         Single          2010

 

                Number of children Unknown         9               2010







Allergies, Adverse Reactions, Alerts





           Substance  Reaction   Codes      Entered Date Inactivated Date Status

 

           _                     Unknown    2019 No Inactive Date Active

 

           * NO KNOWN FOOD ALLERGIES            Unknown    2010 No Inactiv

e Date Active

 

           _                     Unknown    2010 No Inactive Date Active

 

           QUINIDINE-QUININE ANALOGUES hives,     Unknown    2010 No Inact

diamante Date Active







Problems





                Condition       Codes           Effective Dates Condition Status

 

                          Essential (primary) hypertension ICD-9: 401.9

ICD-10: I10               2014                Active

 

                          Generalized anxiety disorder ICD-9: 300.00

ICD-10: F41.1             2018                Active

 

                          Palpitations              ICD-9: 785.1

ICD-10: R00.2             10/02/2018                Active

 

                          Stress reaction           ICD-9: 308.9

ICD-10: F43.0             2020                Active

 

                          Mixed hyperlipidemia      ICD-9: 272.4

ICD-10: E78.2             2019                Active

 

                          FLU VACCINE               ICD-9: V04.81

ICD-10: Z23               2012                Active

 

                          Acute serous otitis media, left ear ICD-9: 381.01

ICD-10: H65.02            2019                Active

 

                          Coronary atherosclerosis due to calcified coronary les

ion ICD-9: 414.00

ICD-10: I25.84            2018                Active

 

                          Hypoglycemia, unspecified ICD-9: 251.2

ICD-10: E16.2             2017                Active

 

                          Other fatigue             ICD-9: 780.79

ICD-10: R53.83            2017                Active

 

                          Urinary tract infection, site not specified ICD-9: 599

.0

ICD-10: N39.0             10/02/2018                Active

 

                          Gastro-esophageal reflux disease without esophagitis I

CD-9: 530.81

ICD-10: K21.9             2018                Active

 

                          Epigastric pain           ICD-9: 789.06

ICD-10: R10.13            2018                Active

 

                          Atherosclerotic heart disease of native coronary arter

y without angina pectoris 

ICD-9: 414.00

ICD-10: I25.10            2018                Active

 

                          Dizziness and giddiness   ICD-9: 780.4

ICD-10: R42               2014                Active

 

                          Occlusion and stenosis of bilateral carotid arteries I

CD-9: 433.10

ICD-10: I65.23            2018                Active

 

                          Cervicalgia               ICD-9: 723.1

ICD-10: M54.2             2018                Active

 

                          Other spondylosis with radiculopathy, cervical region 

ICD-9: 721.0

ICD-10: M47.22            2018                Active

 

                          Cervicocranial syndrome   ICD-9: 723.2

ICD-10: M53.0             2018                Active

 

                          Vertigo of central origin, bilateral ICD-9: 386.2

ICD-10: H81.43            2018                Active

 

                          Benign paroxysmal vertigo, bilateral ICD-9: 386.11

ICD-10: H81.13            2018                Active

 

                          Otalgia, bilateral        ICD-9: 388.70

ICD-10: H92.03            2018                Active

 

                          Left lower quadrant pain  ICD-9: 789.04

ICD-10: R10.32            2017                Active

 

                          Low back pain             ICD-9: 724.2

ICD-10: M54.5             2017                Active

 

                          Radiculopathy, lumbosacral region ICD-9: 724.4

ICD-10: M54.17            2018                Active

 

                          Impaired fasting glucose  ICD-9: 790.29

ICD-10: R73.01            2012                Active

 

                          Other intervertebral disc degeneration, lumbar region 

ICD-9: 722.52

ICD-10: M51.36            2017                Active

 

                          Pain in thoracic spine    ICD-9: 724.1

ICD-10: M54.6             2017                Active

 

                          Mastodynia                ICD-9: 611.71

ICD-10: N64.4             2017                Active

 

                          Acute upper respiratory infection, unspecified ICD-9: 

465.9

ICD-10: J06.9             2017                Active

 

                          Acute mastoiditis without complications, right ear ICD

-9: 383.00

ICD-10: H70.001           2016                Active

 

                          Constipation, unspecified ICD-9: 564.00

ICD-10: K59.00            2016                Active

 

                          Chronic kidney disease, stage 1 ICD-9: 585.1

ICD-10: N18.1             03/15/2016                Active

 

                          History of falling        ICD-9: V15.88

ICD-10: Z91.81            12/15/2015                Active

 

                          Muscle weakness (generalized) ICD-9: 780.79

ICD-10: M62.81            2015                Active

 

                Acute renal failure ICD-9: 584.9    2015      Active

 

                POLYURIA        ICD-9: 788.42   2015      Active

 

                CAD             ICD-9: 414.00   09/10/2015      Active

 

                Hyperglycemia   ICD-9: 790.29   2012      Active

 

                HYPERLIPIDEMIA NEC/NOS ICD-9: 272.4    09/10/2015      Active

 

                ABDOMINAL PAIN  ICD-9: 789.00   10/10/2012      Active

 

                Grieving        ICD-9: 309.0    2015      Active

 

                HYPOGLYCEMIA    ICD-9: 251.2    2012      Active

 

                MALAISE AND FATIGUE ICD-9: 780.79   2015      Active

 

                CERUMEN IMPACTION ICD-9: 380.4    2015      Active

 

                Leg pain        ICD-9: 729.5    2015      Active

 

                SUPERFIC PHLEBITIS-LEG ICD-9: 451.0    2015      Active

 

                SPASM OF MUSCLE ICD-9: 728.85   2015      Active

 

                Thoracic back pain ICD-9: 724.1    2015      Active

 

                Benign positional vertigo ICD-9: 386.11   2015      Active

 

                Suspicious nevus ICD-9: 238.2    2015      Active

 

                EUSTACHIAN TUBE DYSFUNCTION ICD-9: 381.81   2014      Acti

ve

 

                SINUSITIS, ACUTE ICD-9: 461.9    2014      Active

 

                DIZZINESS/VERTIGO ICD-9: 780.4    2014      Active

 

                HYPERTENSION    ICD-9: 401.9    2014      Active

 

                URI, ACUTE      ICD-9: 465.9    10/15/2013      Active

 

                CEPHALGIA       ICD-9: 784.0    2013      Active

 

                OTITIS MEDIA NOS ICD-9: 382.9    2013      Active

 

                Perforation of ear drum ICD-9: 384.20   05/10/2013      Active

 

                Mastalgia       ICD-9: 611.71   2013      Active

 

                DYSPEPSIA       ICD-9: 536.8    10/10/2012      Active

 

                IBS             ICD-9: 564.1    10/10/2012      Active

 

                FLU VACCINE     ICD-9: V04.81   2012      Active

 

                Radiculopathy of leg ICD-9: 724.4    2012      Active

 

                SCIATICA        ICD-9: 724.3    2012      Active

 

                Lumbar degenerative disc disease ICD-9: 722.52   2012     

 Active

 

                Sacroiliac dysfunction ICD-9: 739.4    2012      Active

 

                Hypertension    Unknown         2012      Active

 

                PAIN, LOWER BACK ICD-9: 724.2    2011      Active

 

                SPINAL ENTHESOPATHY ICD-9: 720.1    2011      Active

 

                Hypotension     ICD-9: 458.9    2011      Active

 

                SACROILIITIS NEC ICD-9: 720.2    2011      Active

 

                PHARYNGITIS, ACUTE ICD-9: 462      2010      Active

 

                URINARY TRACT INFECTION ICD-9: 599.0    2010      Active

 

                Heat exhaustion ICD-9: 992.5    2010      Active

 

                Esophagitis     ICD-9: 530.10   2010      Active

 

                Gastritis       ICD-9: 535.50   2010      Active

 

                GERD            ICD-9: 530.81   2010      Active

 

                Hiatal hernia   ICD-9: 553.3    2010      Active

 

                B12 DEFIC ANEMIA NEC ICD-9: 281.1    2010      Active

 

                Anxiety         Unknown         2010      Active

 

                Heart disease   Unknown         2010      Active

 

                Hyperlipidemia  Unknown         2010      Active

 

                ANXIETY STATE NOS ICD-9: 300.00   2010      Active

 

                DEPRESSIVE DISORDER NEC ICD-9: 311      2010      Active







Medications





          Medication Codes     Instructions Start Date Stop Date Status    Fill 

Instructions

 

                    metoprolol tartrate 25 mg tablet RxNorm: 960393      1 Table

t(s) Oral QAM and two 

tablets (50mg) in the evening--clarification/dose change 2020          

Active                                   

 

                    Flonase Allergy Relief 50 mcg/actuation nasal spray,suspensi

on RxNorm: 1690233     2

Spray Nasal every night at bedtime 2020      Inactive     

    

 

             simvastatin 40 mg tablet RxNorm: 972885 1 Tablet(s) Oral QD 

020   

10/19/2020                Active                     

 

                omeprazole 40 mg capsule,delayed release RxNorm: 254676  1 Capsu

le(s) Oral QD 

2020          10/19/2020          Active               

 

                    isosorbide mononitrate ER 30 mg tablet,extended release 24 h

r RxNorm: 873757      1 

Tablet(s) Oral every night at bedtime 2020      10/20/2020      Active    

       

 

                    metoprolol tartrate 25 mg tablet RxNorm: 234302      1 Table

t(s) Oral QAM and two 

tablets (50mg) in the evening 2020      Inactive         

 

                omeprazole 40 mg capsule,delayed release RxNorm: 184600  1 Capsu

le(s) Oral QD 

2020          Inactive             

 

                    Xanax 0.25 mg tablet RxNorm: 225572      TAKE 1 TABLET BY [facility] TWICE DAILY 1 

Tablet(s) Oral two times a day as needed as needed for anxiety 2020                Inactive                   

 

             simvastatin 40 mg tablet RxNorm: 118012 1 Tablet(s) Oral QD 

020   

2020                Inactive                   

 

                    metoprolol tartrate 25 mg tablet RxNorm: 727085      1 Table

t(s) Oral QAM and two 

tablets (50mg) in the evening 2020      Inactive         

 

                    metoprolol tartrate 25 mg tablet RxNorm: 375238      1 Table

t(s) Oral QAM and two 

tablets (50mg) in the evening 2020      Inactive         

 

                    Flonase Allergy Relief 50 mcg/actuation nasal spray,suspensi

on RxNorm: 1971455     2

Spray Nasal every night at bedtime 2020      Inactive     

    

 

           simvastatin 40 mg tablet RxNorm: 895312 1 Tablet(s) PO QD 2019 

Inactive                                 

 

                omeprazole 40 mg capsule,delayed release RxNorm: 919717  1 Capsu

le(s) Oral QD 

2019          Inactive             

 

                Xanax 0.25 mg tablet RxNorm: 430856  TAKE 1 TABLET BY MOUTH TWIC

E DAILY 

10/29/2019          2020          Inactive             

 

                omeprazole 40 mg capsule,delayed release RxNorm: 526495  1 Capsu

le(s) PO QD 

10/07/2019          2019          Inactive             

 

             metoprolol tartrate 25 mg tablet RxNorm: 983947 1 Tablet(s) PO BID 

2019                Inactive                   

 

                omeprazole 40 mg capsule,delayed release RxNorm: 339798  1 Capsu

le(s) PO QD 

2019          10/06/2019          Inactive             

 

                    Flonase Allergy Relief 50 mcg/actuation nasal spray,suspensi

on RxNorm: 7910234     2

[place] NASAL QHS 2019      Inactive         

 

           simvastatin 40 mg tablet RxNorm: 311770 1 Tablet(s) PO QD 2019 

Inactive                                 

 

           simvastatin 40 mg tablet RxNorm: 017667 1 Tablet(s) PO QD 2019 

Inactive                                 

 

                omeprazole 40 mg capsule,delayed release RxNorm: 464899  1 Capsu

le(s) PO QD 

2019          Inactive             

 

           simvastatin 40 mg tablet RxNorm: 125919 1 Tablet(s) PO QD 2019 

Inactive                                 

 

                omeprazole 40 mg capsule,delayed release RxNorm: 713866  1 Capsu

le(s) PO QD 

2019          Inactive             

 

             Bactrim  mg-160 mg tablet RxNorm: 550825 1 Tablet(s) PO BID 0

3/08/2019   

2019                Inactive                   

 

             Bactrim  mg-160 mg tablet RxNorm: 760250 1 Tablet(s) PO BID 0

3/08/2019   

2019                Inactive                   

 

             Macrobid 100 mg capsule RxNorm: 847858 1 Capsule(s) PO BID 20

                          Inactive                   

 

             metoprolol tartrate 25 mg tablet RxNorm: 888519 1 Tablet(s) PO BID 

2019                Inactive                   

 

                Xanax 0.25 mg tablet RxNorm: 273807  TAKE 1 TABLET BY MOUTH TWIC

E DAILY 

2018          10/28/2019          Inactive             

 

           Xanax 0.25 mg tablet RxNorm: 497978 1 Tablet(s) PO BID 2018 

Inactive                                 

 

                    Protonix 40 mg tablet,delayed release RxNorm: 237437      1 

Tablet(s) PO BID for 

stomach--replaces omeprazole 2018      Inactive         

 

             Carafate 1 gram tablet RxNorm: 030659 1 Tablet(s) PO AC & HS 2019                Inactive                   

 

           Xanax 0.25 mg tablet RxNorm: 778980 1 Tablet(s) PO BID 10/30/2018 12/

10/2018 

Inactive                                 

 

             Bactrim  mg-160 mg tablet RxNorm: 052605 1 Tablet(s) PO BID 1

   

10/08/2018                Inactive                   

 

             Bactrim  mg-160 mg tablet RxNorm: 438014 1 Tablet(s) PO BID 1

   

10/03/2018                Inactive                   

 

             Carafate 1 gram tablet RxNorm: 818852 1 Tablet(s) PO AC & HS 09/18/

2018   

10/17/2018                Inactive                   

 

                    Protonix 40 mg tablet,delayed release RxNorm: 259653      1 

Tablet(s) PO BID for 

stomach--replaces omeprazole 2018      Inactive         

 

                    Protonix 40 mg tablet,delayed release RxNorm: 983130      1 

Tablet(s) PO BID for 

stomach--replaces omeprazole 2018      Inactive         

 

                    fluticasone 50 mcg/actuation nasal spray,suspension RxNorm: 

8291777     2 Spray 

NASAL QD to each nostril 2018      Inactive         

 

             Nasonex 50 mcg/actuation Spray RxNorm: 5753659 2 [place] NASAL 2018                Inactive                   

 

                omeprazole 40 mg capsule,delayed release RxNorm: 601140  1 Capsu

le(s) PO QD 

2018          08/15/2018          Inactive             

 

                    simvastatin 40 mg tablet RxNorm: 708832      1 Tablet(s) PO 

QD TAKE 1 TABLET EVERY 

DAY             2018      Inactive         

 

             metoprolol tartrate 25 mg tablet RxNorm: 054334 1/2 Tablet(s) PO QD

 2018                Inactive                   

 

                simvastatin 40 mg tablet RxNorm: 106978  Tablet(s) TAKE 1 TABLET

 EVERY DAY 

2017          Inactive             

 

             metoprolol tartrate 25 mg tablet RxNorm: 304329 1/2 Tablet(s) PO QD

 2017                Inactive                   

 

                    Flonase 50 mcg/actuation nasal spray,suspension RxNorm: 1797

933     2 [place] NASAL 

BID             2017      Inactive         

 

                omeprazole 40 mg capsule,delayed release RxNorm: 429199  1 Capsu

le(s) PO QD 

2017          Inactive             

 

             metoprolol tartrate 25 mg tablet RxNorm: 352995 1/2 Tablet(s) PO QD

 04/10/2017   

2017                Inactive                   

 

                    ciprofloxacin 0.2 % ear drops in a dropperette RxNorm: 75338

6      4 Drop(s) OTIC TID

for 1 week      2016      Inactive         

 

           cefdinir 300 mg capsule RxNorm: 427102 2 Capsule(s) PO QD 2016 

Inactive                                 

 

                    omeprazole 40 mg capsule,delayed release RxNorm: 972119     

 TAKE 1 CAPSULE EVERY DAY

                2016      Inactive         

 

             simvastatin 40 mg tablet RxNorm: 406005 TAKE 1 TABLET EVERY DAY 2017                Inactive                   

 

                    Flonase 50 mcg/actuation nasal spray,suspension RxNorm: 1797

933     2 [place] NASAL 

BID             2015      Inactive         

 

             cefuroxime axetil 250 mg tablet RxNorm: 021759 1 Tablet(s) PO BID 0

2015                Inactive                   

 

             cefuroxime axetil 250 mg tablet RxNorm: 824033 1 Tablet(s) PO BID 0

2015                Inactive                   

 

                omeprazole 40 mg capsule,delayed release RxNorm: 196464  1 Capsu

le(s) PO QD 

2015          Inactive             

 

           meloxicam 7.5 mg tablet RxNorm: 484691 1 Tablet(s) PO QD 2015 0

2015 

Inactive                                 

 

                loratadine 10 mg tablet RxNorm: 759907  1 Tablet(s) PO QAM for a

llergies 

2014          Inactive             

 

                    Flonase 50 mcg/actuation nasal spray,suspension RxNorm: 8963

23      2 [place] NASAL BID

                2014      12/15/2015      Inactive         

 

           prednisone 20 mg tablet RxNorm: 028228 2 Tablet(s) PO BID 2014 

Inactive                                 

 

             Dyazide 37.5 mg-25 mg capsule RxNorm: 910458 1 Capsule(s) PO QAM 2014                Inactive                   

 

           Ceftin 500 mg tablet RxNorm: 217540 1 Tablet(s) PO BID 2014 

Inactive                                 

 

           Ceftin 500 mg tablet RxNorm: 173632 1 Tablet(s) PO BID 2014 

Inactive                                 

 

                    simvastatin 40 mg tablet RxNorm: 965297      Tablet(s) PO TA

KE ONE TABLET BY MOUTH 

EVERY DAY       2014      Inactive         

 

                    metoprolol tartrate 25 mg tablet RxNorm: 099515      Tablet(

s) PO TAKE ONE-HALF 

TABLET BY MOUTH EVERY DAY 2014      Inactive         

 

           Ceftin 500 mg tablet RxNorm: 977884 1 Tablet(s) PO BID 10/15/2013 10/

 

Inactive                                 

 

                loratadine 10 mg tablet RxNorm: 780518  1 Tablet(s) PO QAM for a

llergies 

2013          Inactive             

 

                    omeprazole 20 mg capsule,delayed release RxNorm: 064710     

 Capsule(s) PO TAKE ONE 

CAPSULE BY MOUTH TWICE DAILY 2013      Inactive         

 

                omeprazole 20 mg capsule,delayed release RxNorm: 613888  1 Capsu

le(s) PO BID 

2013          Inactive             

 

             Augmentin 875 mg-125 mg tablet RxNorm: 046842 1 Tablet(s) PO Q12H 0

5/10/2013   

2013                Inactive                   

 

             Floxin Otic Drops 1 bottle Drops RxNorm:      5 Drop(s) OTIC BID 05

/10/2013   

2013                Inactive                   

 

                    metoprolol tartrate 25 mg tablet RxNorm: 282561      Tablet(

s) PO TAKE ONE-HALF 

TABLET BY MOUTH EVERY DAY 2013      Inactive         

 

                    simvastatin 40 mg tablet RxNorm: 593030      Tablet(s) PO TA

KE ONE TABLET BY MOUTH 

EVERY DAY       2013      Inactive         

 

                lancets         RxNorm:         Misc Miscellaneous USE ONE TO CH

YOGI GLUCOSE EVERY DAY 

2013          Inactive             

 

             Carafate 1 gram tablet RxNorm: 330886 1 Tablet(s) PO AC & HS 2015                Inactive                   

 

           Flagyl 500 mg tablet RxNorm: 683961 1 Tablet(s) PO TID 10/10/2012 10/

 

Inactive                                 

 

             Carafate 1 gram tablet RxNorm: 680756 1 Tablet(s) PO AC & HS 10/10/

2012   

2012                Inactive                   

 

          One Touch Test strips RxNorm:   Miscellaneous QD 2012

 Inactive  

one touch ultra mini test strips

 

           Protonix 40 mg Tab RxNorm: 070092 1 Tablet(s) PO QD 2012 

Inactive                                 

 

           Protonix 40 mg Tab RxNorm: 084755 1 Tablet(s) PO QD 2012 10/09/

2012 

Inactive                                 

 

           prednisone 20 mg Tab RxNorm: 263080 1 Tablet(s) PO BID 2012 

Inactive                                 

 

             Flexeril 5 mg Tab RxNorm: 144173 1 Tablet(s) PO QHS for spasm 2012                Inactive                   

 

             Flexeril 5 mg Tab RxNorm: 557643 1 Tablet(s) PO QHS for spasm 2012                Inactive                   

 

                amlodipine 2.5 mg Tab RxNorm: 102494  1/2 Tablet(s) PO QD replac

es 5mg dose 

2012          Inactive             

 

           simvastatin 40 mg tablet RxNorm: 645889 1 Tablet(s) PO QD 2012 

Inactive                                 

 

             metoprolol tartrate 25 mg tablet RxNorm: 699515 1/2 Tablet(s) PO QD

 2012                Inactive                   

 

                omeprazole 20 mg capsule,delayed release RxNorm: 331723  1 Capsu

le(s) PO BID 

2012          Inactive             

 

           cefdinir 300 mg Cap RxNorm: 334602 2 Capsule(s) PO QD 2011 

Inactive                                 

 

           simvastatin 40 mg Tab RxNorm: 080013 1 Tablet(s) PO QD 2011 

Inactive                                 

 

             metoprolol tartrate 25 mg Tab RxNorm: 715164 1/2 Tablet(s) PO QD 10

/   

2012                Inactive                   

 

             metoprolol tartrate 25 mg Tab RxNorm: 525021 1/2 Tablet(s) PO QD 10

/   

10/24/2011                Inactive                   

 

           meclizine 25 mg Tab RxNorm: 7291705 1 Tablet(s) PO QHS 2011 

Inactive                                for dizziness

 

           Ceftin 500 mg Tab RxNorm: 686355 1 Tablet(s) PO BID 2011 

Inactive                                 

 

                omeprazole 20 mg Cap, Delayed Release RxNorm: 280877  1 Capsule(

s) PO BID 

2011          10/24/2011          Inactive             

 

           simvastatin 40 mg Tab RxNorm: 685814 1 Tablet(s) PO QD 2011 

Inactive                                 

 

           simvastatin 40 mg Tab RxNorm: 917836 1 Tablet(s) PO QD 2011 

Inactive                                 

 

           simvastatin 40 mg Tab RxNorm: 220391 1 Tablet(s) PO QD 2010 

Inactive                                 

 

             Tessalon Perles 100 mg Cap RxNorm: 614818 1 Capsule(s) PO Q6-8H 2010                Inactive                   

 

           cefdinir 300 mg Cap RxNorm: 658784 1 Capsule(s) PO BID 2010 

Inactive                                 

 

           Lexapro 10 mg Tab RxNorm: 827570 1 Tablet(s) PO QD 2010

010 

Inactive                                 

 

           Cipro 250 mg Tab RxNorm: 365335 1 Tablet(s) PO BID 2010 10/04/2

010 

Inactive                                 

 

             Wellbutrin  mg 24 hr Tab RxNorm: 417139 1 Tablet(s) PO QAM 2010                Inactive                   

 

          Fish Oil 1,000 mg Cap RxNorm:   1 Capsule(s) PO QD No Start Date      

     Active     

 

                Tylenol Extra Strength 500 mg tablet RxNorm: 318427  1/2 Tablet(

s) PO as needed 

No Start Date                           Active               

 

                nitroglycerin 0.4 mg sublingual tablet RxNorm: 795991  Tablet(s)

 SL as needed No 

Start Date                              Active               

 

                    Calcium with Vitamin D 600 mg (1,500 mg)-400 unit tablet RxN

orm: 678916      1 

Tablet(s) PO QD No Start Date                   Active           

 

           Multivitamin & Mineral Formula Tab RxNorm:    1 Tablet(s) PO QD No St

art Date            

Active                                   

 

          vitamin B complex capsule RxNorm:   1 Capsule(s) PO QD No Start Date  

         Active     

 

          Aspirin 81 mg Tab RxNorm: 038310 1 Tablet(s) PO QD No Start Date      

     Active     

 

           Vitamin D 1,000 unit Cap RxNorm: 438354 1 Capsule(s) PO QD No Start D

ate            

Active                                   

 

          Co Q-10 oral RxNorm: 30745 oral      No Start Date           Active   

  

 

             metoprolol tartrate 25 mg Tab RxNorm: 515380 1/2 Tablet(s) PO QD No

 Start Date 

10/23/2011                Inactive                   

 

             Nasonex 50 mcg/actuation Spray RxNorm: 1119782 2 Spray NASAL No Sta

rt Date 

2018                Inactive                   

 

           Vitamin B12 1000mcg Tablet RxNorm:    1 Tablet(s) PO QD No Start Date

 2015 

Inactive                                 

 

           amlodipine 5 mg Tab RxNorm: 834962 1 Tablet(s) PO QD No Start Date  

Inactive                                 

 

             simvastatin 40 mg tablet RxNorm: 833923 1 Tablet(s) PO QD No Start 

Date 

2019                Inactive                   

 

                omeprazole 20 mg capsule,delayed release RxNorm: 418630  1 Capsu

le(s) PO BID No 

Start Date          2013          Inactive             

 

                omeprazole 40 mg capsule,delayed release RxNorm: 144296  1 Capsu

le(s) PO QD No 

Start Date          2019          Inactive             

 

           Nexium 40 mg Cap RxNorm: 709878 1 Capsule(s) PO QD No Start Date  

Inactive                                 

 

             Stool Softener 100 mg Tab RxNorm: 1575735 2 Tablet(s) PO BID No Sta

rt Date 

2012                Inactive                   

 

                    Calcium with Vitamin D 600 mg (1,500 mg)-400 unit Tab RxNorm

: 542500      1 Tablet(s)

PO QD           No Start Date   2015      Inactive         

 

             iron 325 mg (65 mg iron) Tab RxNorm: 119291 1 Tablet(s) PO QD No St

art Date 

2015                Inactive                   

 

                Flonase 50 mcg/actuation Nasal Spray RxNorm: 064241  2 [place] ROZ

AL BID No Start 

Date                2014          Inactive             

 

                    fluticasone 50 mcg/actuation nasal spray,suspension RxNorm: 

5621535     2 Spray 

NASAL QD to each nostril No Start Date   2018      Inactive         

 

             Nasonex 50 mcg/actuation Spray RxNorm: 6742689 2 Spray NASAL QD No 

Start Date 

2018                Inactive                   

 

                    hydralazine 50 mg tablet RxNorm: 015110      1 Tablet(s) PO 

as needed for BP over 

160/90          No Start Date   2018      Inactive         

 

             Vimovo 500 mg-20 mg 12 hr Tab RxNorm: 229591 1 Tablet(s) PO BID No 

Start Date 

2011                Inactive                   

 

             Tylenol PM 25 mg-500 mg/15 mL Oral Soln RxNorm: 2770770 1 PO QPM   

  No Start Date 

2015                Inactive                   

 

          Iron (Ferrous Sulfate) Oral RxNorm:   Oral      No Start Date 20

12 Inactive   

 

             Reglan 10 mg Tab RxNorm: 812599 1 Tablet(s) PO TID before meals No 

Start Date 

2011                Inactive                   

 

           Xanax 1 mg Tab RxNorm: 820917 1/2 Tablet(s) PO QD No Start Date  

Inactive                                 

 

             amlodipine 10 mg tablet RxNorm: 820911 1 Tablet(s) PO QD No Start D

ate 

2014                Inactive                   

 

          Iron (dried) Oral RxNorm:   Oral      No Start Date 2012 Inactiv

e   

 

                metoprolol tartrate 50 mg tablet RxNorm: 545554  1 Tablet(s) PO 

BID No Start Date

                    12/10/2018          Inactive             

 

           Xanax 0.25 mg tablet RxNorm: 920435 1 Tablet(s) PO BID No Start Date 

10/29/2018 

Inactive                                 

 

                lancets         RxNorm:         Miscellaneous check blood sugar 

at least once daily No Start 

Date                2013          Inactive             

 

           simvastatin 40 mg Tab RxNorm: 077524 1 Tablet(s) PO QD No Start Date 

2010 

Inactive                                 

 

                    isosorbide mononitrate ER 30 mg tablet,extended release 24 h

r RxNorm: 342218      1 

Tablet(s) PO QHS No Start Date   2019      Inactive         

 

             amlodipine 2.5 mg tablet RxNorm: 197774 1 Tablet(s) PO QD No Start 

Date 

2014                Inactive                   

 

                    isosorbide mononitrate ER 30 mg tablet,extended release 24 h

r RxNorm: 516832      1 

Tablet(s) PO QHS No Start Date   2020      Inactive         

 

             sucralfate 1 gram tablet RxNorm: 994572 1 Tablet(s) PO QID No Start

 Date 

2019                Inactive                   

 

          Fish Oil Oral RxNorm:   Oral      No Start Date 2012 Inactive   

 

          Multiple Vitamin Oral RxNorm:   Oral      No Start Date 2012 [place]

ctive   

 

                metoprolol tartrate 25 mg tablet RxNorm: 409560  1/2 Tablet(s) P

O QD No Start 

Date                2017          Inactive             

 

                metoprolol tartrate 50 mg tablet RxNorm: 210033  1/2 Tablet(s) P

O BID No Start 

Date                2019          Inactive             

 

                    Flonase Allergy Relief 50 mcg/actuation nasal spray,suspensi

on RxNorm: 0793704     2

[place] NASAL QHS No Start Date   2019      Inactive         







Medication Administered

No Medication Administered data



Immunizations





                Vaccine         Codes           Date            Status

 

                Influenza       CVX: 135        10/22/2019      Complete

 

                Influenza       CVX: 135        10/22/2019      Complete

 

                Influenza       CVX: 135        2018      Complete

 

                Influenza       CVX: 135        10/06/2017      Complete

 

                Influenza       CVX: 135        10/07/2016      Complete

 

                Influenza       CVX: 135        10/16/2015       

 

                Influenza       CVX: 141        2013       

 

                Influenza (Adult) CVX: 141        10/06/2010       







Results





          Observation Observation Code Item      Item Code Result    Date      S

[facility] Location

 

          COMPLETE BLOOD COUNT [id number]   WBC                 7.1 10e9/L 20

20 Unknown

 

          COMPLETE BLOOD COUNT 7091277   RBC                 4.16 10e12/L 2020 Unknown

 

          COMPLETE BLOOD COUNT 8691784   HEMOGLOBIN           12.4 g/dL 20 Unknown

 

          COMPLETE BLOOD COUNT [id number]   HEMATOCRIT           39.3 %    20

20 Unknown

 

          COMPLETE BLOOD COUNT [id number]   MCV                 94.5 fL   

0 Unknown

 

          COMPLETE BLOOD COUNT [id number]   MCH                 29.8 pg   

0 Unknown

 

          COMPLETE BLOOD COUNT 6000444   MCHC                31.6 g/dL 

0 Unknown

 

          COMPLETE BLOOD COUNT [id number]   PLATELET COUNT           161 10e9/L 2020 Unknown

 

          COMPLETE BLOOD COUNT [id number]   Mean Plt Volume           10.8 fL   2020 Unknown

 

          COMPLETE BLOOD COUNT [id number]   Neut Auto           38.7 %    

0 Unknown

 

          COMPLETE BLOOD COUNT [id number]   Lymph Auto           48.0 %    20 Unknown

 

          COMPLETE BLOOD COUNT [id number]   Mono Auto           9.5 %     

0 Unknown

 

          COMPLETE BLOOD COUNT [id number]   RDW                 13.5 %    

0 Unknown

 

          COMPLETE BLOOD COUNT [id number]   Eos Auto            3.2 %     

0 Unknown

 

          COMPLETE BLOOD COUNT [id number]   Baso Auto           0.6 %     

0 Unknown

 

          COMPLETE BLOOD COUNT [id number]   Neutrophil Abs           2.75 10e9/L  Unknown

 

          COMPLETE BLOOD COUNT [id number]   Lymphocyte Abs           3.41 10e9/L  Unknown

 

          COMPLETE BLOOD COUNT [id number]   Monocyte Abs           0.67 10e9/L 2020 Unknown

 

          COMPLETE BLOOD COUNT 6742506   Eosinophil Abs           0.23 10e9/L  Unknown

 

          COMPLETE BLOOD COUNT [id number]   RDW-SD              44.7 fL   

0 Unknown

 

          COMPLETE BLOOD COUNT [id number]   Basophil Abs           0.04 10e9/L 2020 Unknown

 

          THYROID STIMULATING HORMONE 80925     TSH                 1.677 uIU/mL

 2020 Unknown

 

          COMPREHENSIVE METABOLIC 70741     AST                 19 U/L    2020 Unknown

 

          COMPREHENSIVE METABOLIC 57995     ALT                 12 U/L    2020 Unknown

 

          COMPREHENSIVE METABOLIC 69062     BUN                 17 mg/dL  2020 Unknown

 

          COMPREHENSIVE METABOLIC 93681     ALBUMIN             4.2 g/dL  2020 Unknown

 

          COMPREHENSIVE METABOLIC 56579     CHLORIDE            103 mmol/L  Unknown

 

          COMPREHENSIVE METABOLIC 76238     Bili Total           0.2 mg/dL  Unknown

 

          COMPREHENSIVE METABOLIC 91704     ALK PHOS            61 U/L    2020 Unknown

 

          COMPREHENSIVE METABOLIC 13930     SODIUM              140 mmol/L  Unknown

 

          COMPREHENSIVE METABOLIC 16434     CREATININE           0.95 mg/dL 2020 Unknown

 

          COMPREHENSIVE METABOLIC 15023     CALCIUM             9.4 mg/dL 2020 Unknown

 

          COMPREHENSIVE METABOLIC 49414     POTASSIUM           4.2 mmol/L  Unknown

 

          COMPREHENSIVE METABOLIC 01777     Total Protein           6.5 g/dL   Unknown

 

          COMPREHENSIVE METABOLIC 67033     Glucose             103 mg/dL 2020 Unknown

 

          COMPREHENSIVE METABOLIC 52489     Bicarbonate           26 mmol/L 2020 Unknown

 

          COMPREHENSIVE METABOLIC 54889     AGAP                11 mmol/L 2020 Unknown

 

          GFR CALC  8320215   GFR Non Afr Amr           57 mL/min 2020 Unk

nown

 

          GFR CALC  6487279   GFR Afr Amr           >60 mL/min 2020 Unknow

n

 

          GFR CALC  1644347   GFR Non Afr Amr           52 mL/min 10/11/2019 Unk

nown

 

          GFR CALC  6331623   GFR Afr Amr           >60 mL/min 10/11/2019 Unknow

n

 

          COMPREHENSIVE METABOLIC 25111     AST                 17 U/L    10/11/

2019 Unknown

 

          COMPREHENSIVE METABOLIC 93547     ALT                 11 U/L    10/11/

2019 Unknown

 

          COMPREHENSIVE METABOLIC 59721     BUN                 17 mg/dL  10/11/

2019 Unknown

 

          COMPREHENSIVE METABOLIC 23830     ALBUMIN             3.9 g/dL  10/11/

2019 Unknown

 

          COMPREHENSIVE METABOLIC 91234     CHLORIDE            108 mmol/L 10/11

/2019 Unknown

 

          COMPREHENSIVE METABOLIC 32582     Bili Total           0.5 mg/dL 10/11

/2019 Unknown

 

          COMPREHENSIVE METABOLIC 34897     ALK PHOS            72 U/L    10/11/

2019 Unknown

 

          COMPREHENSIVE METABOLIC 56322     SODIUM              142 mmol/L 10/11

/2019 Unknown

 

          COMPREHENSIVE METABOLIC 82998     CREATININE           1.02 mg/dL 10/1

2019 Unknown

 

          COMPREHENSIVE METABOLIC 07860     CALCIUM             9.3 mg/dL 10/11/

2019 Unknown

 

          COMPREHENSIVE METABOLIC 76010     POTASSIUM           4.0 mmol/L 10/11

/2019 Unknown

 

          COMPREHENSIVE METABOLIC 40945     Total Protein           6.2 g/dL  10

/2019 Unknown

 

          COMPREHENSIVE METABOLIC 56413     Glucose             93 mg/dL  10/11/

2019 Unknown

 

          COMPREHENSIVE METABOLIC 06778     Bicarbonate           27 mmol/L 10/1

2019 Unknown

 

          COMPREHENSIVE METABOLIC 89601     AGAP                7 mmol/L  10/11/

2019 Unknown

 

          GFR CALC  6436959   GFR Non Afr Amr           48 mL/min 06/10/2019 Unk

nown

 

          GFR CALC  6994745   GFR Afr Amr           59 mL/min 06/10/2019 Unknown

 

          THYROID STIMULATING HORMONE 67045     TSH                 1.936 uIU/mL

 06/10/2019 Unknown

 

          COMPREHENSIVE METABOLIC 64211     AST                 18 U/L    06/10/

2019 Unknown

 

          COMPREHENSIVE METABOLIC 58388     ALT                 11 U/L    06/10/

2019 Unknown

 

          COMPREHENSIVE METABOLIC 77741     BUN                 18 mg/dL  06/10/

2019 Unknown

 

          COMPREHENSIVE METABOLIC 11125     ALBUMIN             4.2 g/dL  06/10/

2019 Unknown

 

          COMPREHENSIVE METABOLIC 17362     CHLORIDE            109 mmol/L 06/10

/2019 Unknown

 

          COMPREHENSIVE METABOLIC 20022     Bili Total           0.4 mg/dL 06/10

/2019 Unknown

 

          COMPREHENSIVE METABOLIC 86026     ALK PHOS            64 U/L    06/10/

2019 Unknown

 

          COMPREHENSIVE METABOLIC 84117     SODIUM              142 mmol/L 06/10

/2019 Unknown

 

          COMPREHENSIVE METABOLIC 47177     CREATININE           1.09 mg/dL  Unknown

 

          COMPREHENSIVE METABOLIC 59062     CALCIUM             9.3 mg/dL 06/10/

2019 Unknown

 

          COMPREHENSIVE METABOLIC 13817     POTASSIUM           4.7 mmol/L 06/10

/2019 Unknown

 

          COMPREHENSIVE METABOLIC 36777     Total Protein           6.3 g/dL  06

/10/2019 Unknown

 

          COMPREHENSIVE METABOLIC 05456     Glucose             84 mg/dL  06/10/

2019 Unknown

 

          COMPREHENSIVE METABOLIC 16493     Bicarbonate           25 mmol/L  Unknown

 

          COMPREHENSIVE METABOLIC 35011     AGAP                8 mmol/L  06/10/

2019 Unknown

 

          COMPLETE BLOOD COUNT [id number]   WBC                 7.8 10e9/L 06/10/20

19 Unknown

 

          COMPLETE BLOOD COUNT [id number]   RBC                 4.04 10e12/L 06/10/

2019 Unknown

 

          COMPLETE BLOOD COUNT [id number]   HEMOGLOBIN           12.2 g/dL 06/10/20

19 Unknown

 

          COMPLETE BLOOD COUNT [id number]   HEMATOCRIT           38.6 %    06/10/20

19 Unknown

 

          COMPLETE BLOOD COUNT [id number]   MCV                 95.5 fL   06/10/201

9 Unknown

 

          COMPLETE BLOOD COUNT [id number]   MCH                 30.2 pg   06/10/201

9 Unknown

 

          COMPLETE BLOOD COUNT [id number]   MCHC                31.6 g/dL 06/10/201

9 Unknown

 

          COMPLETE BLOOD COUNT [id number]   PLATELET COUNT           215 10e9/L 06/

10/2019 Unknown

 

          COMPLETE BLOOD COUNT [id number]   Mean Plt Volume           11.2 fL   06/

10/2019 Unknown

 

          COMPLETE BLOOD COUNT [id number]   Neut Auto           45.7 %    06/10/201

9 Unknown

 

          COMPLETE BLOOD COUNT [id number]   Lymph Auto           42.1 %    06/10/20

19 Unknown

 

          COMPLETE BLOOD COUNT [id number]   Mono Auto           9.2 %     06/10/201

9 Unknown

 

          COMPLETE BLOOD COUNT [id number]   RDW                 13.4 %    06/10/201

9 Unknown

 

          COMPLETE BLOOD COUNT [id number]   Eos Auto            2.7 %     06/10/201

9 Unknown

 

          COMPLETE BLOOD COUNT [id number]   Baso Auto           0.3 %     06/10/201

9 Unknown

 

          COMPLETE BLOOD COUNT [id number]   Neutrophil Abs           3.56 10e9/L 06

/10/2019 Unknown

 

          COMPLETE BLOOD COUNT [id number]   Lymphocyte Abs           3.28 10e9/L 06

/10/2019 Unknown

 

          COMPLETE BLOOD COUNT [id number]   Monocyte Abs           0.72 10e9/L  Unknown

 

          COMPLETE BLOOD COUNT [id number]   Eosinophil Abs           0.21 10e9/L 06

/10/2019 Unknown

 

          COMPLETE BLOOD COUNT [id number]   RDW-SD              44.9 fL   06/10/201

9 Unknown

 

          COMPLETE BLOOD COUNT [id number]   Basophil Abs           0.02 10e9/L  Unknown

 

          COMPLETE BLOOD COUNT [id number]   WBC                 5.2 10e9/L 20

18 Unknown

 

          COMPLETE BLOOD COUNT [id number]   RBC                 4.21 10e12/L 2018 Unknown

 

          COMPLETE BLOOD COUNT [id number]   HEMOGLOBIN           12.8 g/dL 20

18 Unknown

 

          COMPLETE BLOOD COUNT [id number]   HEMATOCRIT           39.0 %    20

18 Unknown

 

          COMPLETE BLOOD COUNT [id number]   MCV                 92.6 fL   

8 Unknown

 

          COMPLETE BLOOD COUNT [id number]   MCH                 30.4 pg   

8 Unknown

 

          COMPLETE BLOOD COUNT [id number]   MCHC                32.8 g/dL 

8 Unknown

 

          COMPLETE BLOOD COUNT [id number]   PLATELET COUNT           202 10e9/L  Unknown

 

          COMPLETE BLOOD COUNT [id number]   Mean Plt Volume           10.7 fL    Unknown

 

          COMPLETE BLOOD COUNT [id number]   Neut Auto           40.9 %    

8 Unknown

 

          COMPLETE BLOOD COUNT [id number]   Lymph Auto           46.3 %    20

18 Unknown

 

          COMPLETE BLOOD COUNT [id number]   Mono Auto           9.3 %     

8 Unknown

 

          COMPLETE BLOOD COUNT [id number]   RDW                 13.7 %    

8 Unknown

 

          COMPLETE BLOOD COUNT [id number]   Eos Auto            2.9 %     

8 Unknown

 

          COMPLETE BLOOD COUNT [id number]   Baso Auto           0.6 %     

8 Unknown

 

          COMPLETE BLOOD COUNT [id number]   Neutrophil Abs           2.13 10e9/L  Unknown

 

          COMPLETE BLOOD COUNT [id number]   Lymphocyte Abs           2.41 10e9/L  Unknown

 

          COMPLETE BLOOD COUNT [id number]   Monocyte Abs           0.48 10e9/L  Unknown

 

          COMPLETE BLOOD COUNT [id number]   Eosinophil Abs           0.15 10e9/L  Unknown

 

          COMPLETE BLOOD COUNT [id number]   RDW-SD              45.2 fL   

8 Unknown

 

          COMPLETE BLOOD COUNT [id number]   Basophil Abs           0.03 10e9/L  Unknown

 

          [place] [address]     Glucose             118 mg/dL  Unknown

 

          [place] [address]     CREATININE           1.01 mg/dL  Unknown

 

          [place] [address]     BUN                 14 mg/dL   Unknown

 

          [place] [address]     SODIUM              141 mmol/L  Unknown

 

          [place] [address]     POTASSIUM           4.0 mmol/L  Unknown

 

          [place] [address]     CHLORIDE            108 mmol/L  Unknown

 

          [place] [address]     Bicarbonate           25 mmol/L  Unknown

 

          [place] [address]     AGAP                8 mmol/L   Unknown

 

          [place] [address]     CALCIUM             9.6 mg/dL  Unknown

 

          FREE T4   44273     T4 Free             1.23 ng/dL 2018 Unknown

 

          GFR CALC  6468853   GFR Non Afr Amr           53 mL/min 2018 Unk

nown

 

          GFR CALC  4840563   GFR Afr Amr           >60 mL/min 2018 Unknow

n

 

          THYROID STIMULATING HORMONE 37250     TSH                 2.124 uIU/mL

 2018 Unknown

 

          LIPID GROUP [id number]     Cholesterol           152 mg/dL 2018 Unkno

wn

 

          LIPID GROUP 01601     Triglyceride           151 mg/dL 2018 Unkn

own

 

          LIPID GROUP [id number]     HDL CHOLESTEROL           47 mg/dL  2018 U

nknown

 

          LIPID GROUP 96105     Chol/HDL Ratio           3.23 ratio 2018 U

nknown

 

          LIPID GROUP 67769     NON-HDL Chol           105 mg/dL 2018 Unkn

own

 

          LIPID GROUP 72433     LDL Cholesterol           75 mg/dL  2018 U

nknown

 

          ASSAY OF TROPONIN QUANT 86534     Troponin-I           <0.30 ng/mL  Unknown

 

          COMPREHENSIVE METABOLIC 36015     AST                 20 U/L    2018 Unknown

 

          COMPREHENSIVE METABOLIC 42579     ALT                 14 U/L    2018 Unknown

 

          COMPREHENSIVE METABOLIC 49686     BUN                 19 mg/dL  2018 Unknown

 

          COMPREHENSIVE METABOLIC 84130     ALBUMIN             4.2 g/dL  2018 Unknown

 

          COMPREHENSIVE METABOLIC 26785     CHLORIDE            102 mmol/L  Unknown

 

          COMPREHENSIVE METABOLIC 16428     Bili Total           0.4 mg/dL  Unknown

 

          COMPREHENSIVE METABOLIC 73821     ALK PHOS            66 U/L    2018 Unknown

 

          COMPREHENSIVE METABOLIC 05918     SODIUM              135 mmol/L  Unknown

 

          COMPREHENSIVE METABOLIC 61745     CREATININE           1.01 mg/dL  Unknown

 

          COMPREHENSIVE METABOLIC 31418     CALCIUM             9.3 mg/dL 2018 Unknown

 

          COMPREHENSIVE METABOLIC 02449     POTASSIUM           4.8 mmol/L  Unknown

 

          COMPREHENSIVE METABOLIC 82758     Total Protein           7.0 g/dL   Unknown

 

          COMPREHENSIVE METABOLIC 38655     Glucose             91 mg/dL  2018 Unknown

 

          COMPREHENSIVE METABOLIC 20662     Bicarbonate           23 mmol/L  Unknown

 

          COMPREHENSIVE METABOLIC 55824     AGAP                10 mmol/L 2018 Unknown

 

          COMPLETE BLOOD COUNT [id number]   WBC                 7.5 10e9/L 20

18 Unknown

 

          COMPLETE BLOOD COUNT [id number]   RBC                 4.13 10e12/L 2018 Unknown

 

          COMPLETE BLOOD COUNT [id number]   HEMOGLOBIN           12.6 g/dL 20

18 Unknown

 

          COMPLETE BLOOD COUNT [id number]   HEMATOCRIT           38.4 %    20

18 Unknown

 

          COMPLETE BLOOD COUNT [id number]   MCV                 93.0 fL   

8 Unknown

 

          COMPLETE BLOOD COUNT [id number]   MCH                 30.5 pg   

8 Unknown

 

          COMPLETE BLOOD COUNT [id number]   MCHC                32.8 g/dL 

8 Unknown

 

          COMPLETE BLOOD COUNT [id number]   PLATELET COUNT           204 10e9/L  Unknown

 

          COMPLETE BLOOD COUNT [id number]   Mean Plt Volume           10.9 fL    Unknown

 

          COMPLETE BLOOD COUNT [id number]   Neut Auto           43.1 %    

8 Unknown

 

          COMPLETE BLOOD COUNT [id number]   Lymph Auto           45.0 %    20

18 Unknown

 

          COMPLETE BLOOD COUNT [id number]   Mono Auto           9.2 %     

8 Unknown

 

          COMPLETE BLOOD COUNT [id number]   RDW                 13.4 %    

8 Unknown

 

          COMPLETE BLOOD COUNT [id number]   Eos Auto            2.3 %     

8 Unknown

 

          COMPLETE BLOOD COUNT [id number]   Baso Auto           0.4 %     

8 Unknown

 

          COMPLETE BLOOD COUNT [id number]   Neutrophil Abs           3.23 10e9/L  Unknown

 

          COMPLETE BLOOD COUNT [id number]   Lymphocyte Abs           3.38 10e9/L  Unknown

 

          COMPLETE BLOOD COUNT [id number]   Monocyte Abs           0.69 10e9/L  Unknown

 

          COMPLETE BLOOD COUNT [id number]   Eosinophil Abs           0.17 10e9/L  Unknown

 

          COMPLETE BLOOD COUNT [id number]   RDW-SD              44.4 fL   

8 Unknown

 

          COMPLETE BLOOD COUNT [id number]   Basophil Abs           0.03 10e9/L  Unknown

 

          GFR CALC  9037611   GFR Non Afr Amr           53 mL/min 2018 Unk

nown

 

          GFR CALC  4818397   GFR Afr Amr           >60 mL/min 2018 Unknow

n

 

          GLYCOSYLATED HEMOGLOBIN TEST 23076     Hgb A1c   01004-7   5.4 %     0

2018 Unknown

 

          MEAN GLUC 0028885   Calc Mean Gluc           108 mg/dL 2018 Unkn

own

 

          MEAN GLUC 5342522   Calc Mean Gluc           114 mg/dL 2017 Unkn

own

 

          LIPID GROUP 28911     Cholesterol           146 mg/dL 2017 Unkno

wn

 

          LIPID GROUP [id number]     Triglyceride           119 mg/dL 2017 Unkn

own

 

          LIPID GROUP 96588     HDL CHOLESTEROL           47 mg/dL  2017 U

nknown

 

          LIPID GROUP 87859     Chol/HDL Ratio           3.11 ratio 2017 U

nknown

 

          LIPID GROUP 94041     NON-HDL Chol           99 mg/dL  2017 Unkn

own

 

          LIPID GROUP 17756     LDL Cholesterol           75 mg/dL  2017 U

nknown

 

          GLYCOSYLATED HEMOGLOBIN TEST 48371     Hgb A1c   46867-7   5.6 %     0

2017 Unknown

 

          COMPREHENSIVE METABOLIC 81074     AST                 22 U/L    2017 Unknown

 

          COMPREHENSIVE METABOLIC 53401     ALT                 12 U/L    2017 Unknown

 

          COMPREHENSIVE METABOLIC 66575     BUN                 17 mg/dL  2017 Unknown

 

          COMPREHENSIVE METABOLIC 49736     ALBUMIN             4.0 g/dL  2017 Unknown

 

          COMPREHENSIVE METABOLIC 71530     CHLORIDE            110 mmol/L  Unknown

 

          COMPREHENSIVE METABOLIC 30011     Bili Total           0.4 mg/dL  Unknown

 

          COMPREHENSIVE METABOLIC 33213     ALK PHOS            63 U/L    2017 Unknown

 

          COMPREHENSIVE METABOLIC 42431     SODIUM              140 mmol/L  Unknown

 

          COMPREHENSIVE METABOLIC 51121     CREATININE           1.05 mg/dL  Unknown

 

          COMPREHENSIVE METABOLIC 35145     CALCIUM             9.2 mg/dL 2017 Unknown

 

          COMPREHENSIVE METABOLIC 34724     POTASSIUM           4.2 mmol/L  Unknown

 

          COMPREHENSIVE METABOLIC 06479     Total Protein           6.2 g/dL   Unknown

 

          COMPREHENSIVE METABOLIC 46756     Glucose             87 mg/dL  2017 Unknown

 

          COMPREHENSIVE METABOLIC 91046     Bicarbonate           24 mmol/L  Unknown

 

          COMPREHENSIVE METABOLIC 16650     AGAP                6 mmol/L  2017 Unknown

 

          GFR CALC  9843718   GFR Non Afr Amr           51 mL/min 2017 Unk

nown

 

          GFR CALC  8733271   GFR Afr Amr           >60 mL/min 2017 Unknow

n

 

          COMPLETE BLOOD COUNT [id number]   WBC                 6.7 10e9/L 20

17 Unknown

 

          COMPLETE BLOOD COUNT [id number]   RBC                 4.04 10e12/L 2017 Unknown

 

          COMPLETE BLOOD COUNT [id number]   HEMOGLOBIN           12.1 g/dL 20

17 Unknown

 

          COMPLETE BLOOD COUNT 9897982   HEMATOCRIT           38.0 %    20

17 Unknown

 

          COMPLETE BLOOD COUNT 2843086   MCV                 94.1 fL   

7 Unknown

 

          COMPLETE BLOOD COUNT [id number]   MCH                 30.0 pg   

7 Unknown

 

          COMPLETE BLOOD COUNT 9392317   MCHC                31.8 g/dL 

7 Unknown

 

          COMPLETE BLOOD COUNT 8112981   PLATELET COUNT           206 10e9/L  Unknown

 

          COMPLETE BLOOD COUNT [id number]   Mean Plt Volume           11.3 fL    Unknown

 

          COMPLETE BLOOD COUNT [id number]   Neut Auto           35.8 %    

7 Unknown

 

          COMPLETE BLOOD COUNT [id number]   Lymph Auto           51.6 %    20

17 Unknown

 

          COMPLETE BLOOD COUNT [id number]   Mono Auto           8.8 %     

7 Unknown

 

          COMPLETE BLOOD COUNT [id number]   RDW                 13.5 %    

7 Unknown

 

          COMPLETE BLOOD COUNT [id number]   Eos Auto            3.4 %     

7 Unknown

 

          COMPLETE BLOOD COUNT [id number]   Baso Auto           0.4 %     

7 Unknown

 

          COMPLETE BLOOD COUNT [id number]   Neutrophil Abs           2.40 10e9/L  Unknown

 

          COMPLETE BLOOD COUNT 5673512   Lymphocyte Abs           3.46 10e9/L  Unknown

 

          COMPLETE BLOOD COUNT [id number]   Monocyte Abs           0.59 10e9/L  Unknown

 

          COMPLETE BLOOD COUNT [id number]   Eosinophil Abs           0.23 10e9/L  Unknown

 

          COMPLETE BLOOD COUNT [id number]   RDW-SD              45.3 fL   

7 Unknown

 

          COMPLETE BLOOD COUNT [id number]   Basophil Abs           0.03 10e9/L  Unknown

 

          THYROID STIMULATING HORMONE 02185     TSH                 1.981 uIU/mL

 2017 Unknown

 

          COMPLETE BLOOD COUNT [id number]   WBC                 6.0 10e9/L 20

17 Unknown

 

          COMPLETE BLOOD COUNT [id number]   RBC                 4.29 10e12/L 2017 Unknown

 

          COMPLETE BLOOD COUNT [id number]   HEMOGLOBIN           12.9 g/dL 20

17 Unknown

 

          COMPLETE BLOOD COUNT [id number]   HEMATOCRIT           38.4 %    20

17 Unknown

 

          COMPLETE BLOOD COUNT [id number]   MCV                 89.5 fL   

7 Unknown

 

          COMPLETE BLOOD COUNT [id number]   MCH                 30.1 pg   

7 Unknown

 

          COMPLETE BLOOD COUNT [id number]   MCHC                33.6 g/dL 

7 Unknown

 

          COMPLETE BLOOD COUNT 6575143   PLATELET COUNT           181 10e9/L 2017 Unknown

 

          COMPLETE BLOOD COUNT 7165214   Mean Plt Volume           11.7 fL   2017 Unknown

 

          COMPLETE BLOOD COUNT [id number]   Neut Auto           36.9 %    

7 Unknown

 

          COMPLETE BLOOD COUNT [id number]   Lymph Auto           50.4 %    20

17 Unknown

 

          COMPLETE BLOOD COUNT [id number]   Mono Auto           9.0 %     

7 Unknown

 

          COMPLETE BLOOD COUNT [id number]   RDW                 13.7 %    

7 Unknown

 

          COMPLETE BLOOD COUNT [id number]   Eos Auto            3.4 %     

7 Unknown

 

          COMPLETE BLOOD COUNT 9343935   Baso Auto           0.3 %     

7 Unknown

 

          COMPLETE BLOOD COUNT [id number]   Neutrophil Abs           2.21 10e9/L  Unknown

 

          COMPLETE BLOOD COUNT [id number]   Lymphocyte Abs           3.02 10e9/L  Unknown

 

          COMPLETE BLOOD COUNT [id number]   Monocyte Abs           0.54 10e9/L 2017 Unknown

 

          COMPLETE BLOOD COUNT [id number]   Eosinophil Abs           0.20 10e9/L  Unknown

 

          COMPLETE BLOOD COUNT 9847520   RDW-SD              44.0 fL   

7 Unknown

 

          COMPLETE BLOOD COUNT [id number]   Basophil Abs           0.02 10e9/L 2017 Unknown

 

          GLYCOSYLATED HEMOGLOBIN TEST 90969     Hgb A1c   06097-8   5.4 %     0

3/09/2017 Unknown

 

          THYROID STIMULATING HORMONE 14261     TSH                 2.200 uIU/mL

 2017 Unknown

 

          GFR CALC  4683433   GFR Non Afr Amr           50 mL/min 2017 Unk

nown

 

          GFR CALC  0059105   GFR Afr Amr           >60 mL/min 2017 Unknow

n

 

          MEAN GLUC 7442669   Calc Mean Gluc           108 mg/dL 2017 Unkn

own

 

          COMPREHENSIVE METABOLIC 46358     AST                 18 U/L    2017 Unknown

 

          COMPREHENSIVE METABOLIC 58468     ALT                 10 U/L    2017 Unknown

 

          COMPREHENSIVE METABOLIC 62367     BUN                 20 mg/dL  2017 Unknown

 

          COMPREHENSIVE METABOLIC 21325     ALBUMIN             4.1 g/dL  2017 Unknown

 

          COMPREHENSIVE METABOLIC 42252     CHLORIDE            109 mmol/L  Unknown

 

          COMPREHENSIVE METABOLIC 31190     Bili Total           0.6 mg/dL  Unknown

 

          COMPREHENSIVE METABOLIC 77401     ALK PHOS            64 U/L    2017 Unknown

 

          COMPREHENSIVE METABOLIC 18520     SODIUM              141 mmol/L  Unknown

 

          COMPREHENSIVE METABOLIC 07496     CREATININE           1.06 mg/dL 2017 Unknown

 

          COMPREHENSIVE METABOLIC 07674     CALCIUM             9.9 mg/dL 2017 Unknown

 

          COMPREHENSIVE METABOLIC 02029     POTASSIUM           4.2 mmol/L  Unknown

 

          COMPREHENSIVE METABOLIC 53774     Total Protein           6.3 g/dL   Unknown

 

          COMPREHENSIVE METABOLIC 59559     Glucose             99 mg/dL  2017 Unknown

 

          COMPREHENSIVE METABOLIC 94721     Bicarbonate           21 mmol/L 2017 Unknown

 

          COMPREHENSIVE METABOLIC 91057     AGAP                11 mmol/L 2017 Unknown

 

          LIPID GROUP 40006     Cholesterol           169 mg/dL 2016 Unkno

wn

 

          LIPID GROUP 62983     Triglyceride           165 mg/dL 2016 Unkn

own

 

          LIPID GROUP 96111     HDL CHOLESTEROL           43 mg/dL  2016 U

nknown

 

          LIPID GROUP 91673     Chol/HDL Ratio           3.93 ratio 2016 U

nknown

 

          LIPID GROUP 65585     NON-HDL Chol           126 mg/dL 2016 Unkn

own

 

          LIPID GROUP 26025     LDL Cholesterol           93 mg/dL  2016 U

nknown

 

          COMPREHENSIVE METABOLIC 65348     AST                 18 U/L    2016 Unknown

 

          COMPREHENSIVE METABOLIC 64743     ALT                 10 U/L    2016 Unknown

 

          COMPREHENSIVE METABOLIC 54259     BUN                 20 mg/dL  2016 Unknown

 

          COMPREHENSIVE METABOLIC 10151     ALBUMIN             3.9 g/dL  2016 Unknown

 

          COMPREHENSIVE METABOLIC 63361     CHLORIDE            110 mmol/L  Unknown

 

          COMPREHENSIVE METABOLIC 99289     Bili Total           0.5 mg/dL  Unknown

 

          COMPREHENSIVE METABOLIC 75127     ALK PHOS            72 U/L    2016 Unknown

 

          COMPREHENSIVE METABOLIC 49288     SODIUM              141 mmol/L  Unknown

 

          COMPREHENSIVE METABOLIC 84794     CREATININE           1.12 mg/dL  Unknown

 

          COMPREHENSIVE METABOLIC 49691     CALCIUM             9.7 mg/dL 2016 Unknown

 

          COMPREHENSIVE METABOLIC 84181     POTASSIUM           4.4 mmol/L  Unknown

 

          COMPREHENSIVE METABOLIC 84954     Total Protein           6.2 g/dL   Unknown

 

          COMPREHENSIVE METABOLIC 43865     Glucose             90 mg/dL  2016 Unknown

 

          COMPREHENSIVE METABOLIC 34858     Bicarbonate           23 mmol/L  Unknown

 

          COMPREHENSIVE METABOLIC 76282     AGAP                8 mmol/L  2016 Unknown

 

          GFR CALC  9834814   GFR Non Afr Amr           47 mL/min 2016 Unk

nown

 

          GFR CALC  2739399   GFR Afr Amr           57 mL/min 2016 Unknown

 

          GLYCOSYLATED HEMOGLOBIN TEST 64618     Hgb A1c   69460-5   5.5 %     0

2016 Unknown

 

          THYROID STIMULATING HORMONE 54615     TSH                 2.537 uIU/mL

 2016 Unknown

 

          FREE T4   24724     T4 Free             1.36 ng/dL 2016 Unknown

 

          COMPLETE BLOOD COUNT [id number]   WBC                 6.8 10e9/L 20

16 Unknown

 

          COMPLETE BLOOD COUNT [id number]   RBC                 4.20 10e12/L 2016 Unknown

 

          COMPLETE BLOOD COUNT 6476841   HEMOGLOBIN           12.5 g/dL 20

16 Unknown

 

          COMPLETE BLOOD COUNT 8515084   HEMATOCRIT           38.0 %    20

16 Unknown

 

          COMPLETE BLOOD COUNT 6301508   MCV                 90.5 fL   

6 Unknown

 

          COMPLETE BLOOD COUNT 8181470   MCH                 29.8 pg   

6 Unknown

 

          COMPLETE BLOOD COUNT [id number]   MCHC                32.9 g/dL 

6 Unknown

 

          COMPLETE BLOOD COUNT 8658639   PLATELET COUNT           197 10e9/L  Unknown

 

          COMPLETE BLOOD COUNT 6189893   Mean Plt Volume           11.7 fL    Unknown

 

          COMPLETE BLOOD COUNT [id number]   Neut Auto           41.3 %    

6 Unknown

 

          COMPLETE BLOOD COUNT 5857830   Lymph Auto           47.1 %    20

16 Unknown

 

          COMPLETE BLOOD COUNT [id number]   Mono Auto           7.8 %     

6 Unknown

 

          COMPLETE BLOOD COUNT 6843312   RDW                 13.8 %    

6 Unknown

 

          COMPLETE BLOOD COUNT 2384551   Eos Auto            3.4 %     

6 Unknown

 

          COMPLETE BLOOD COUNT [id number]   Baso Auto           0.4 %     

6 Unknown

 

          COMPLETE BLOOD COUNT [id number]   Neutrophil Abs           2.81 10e9/L  Unknown

 

          COMPLETE BLOOD COUNT 0108470   Lymphocyte Abs           3.20 10e9/L  Unknown

 

          COMPLETE BLOOD COUNT 4751417   Monocyte Abs           0.53 10e9/L  Unknown

 

          COMPLETE BLOOD COUNT [id number]   Eosinophil Abs           0.23 10e9/L  Unknown

 

          COMPLETE BLOOD COUNT [id number]   RDW-SD              44.4 fL   

6 Unknown

 

          COMPLETE BLOOD COUNT [id number]   Basophil Abs           0.03 10e9/L  Unknown

 

          MEAN GLUC 8981758   Calc Mean Gluc           111 mg/dL 2016 Unkn

own

 

          [place] [address]     Glucose             89 MG/DL   Unknown

 

          [place] [address]     CREATININE           1.12 MG/DL  Unknown

 

          [place] [address]     BUN                 20 MG/DL   Unknown

 

          [place] [address]     SODIUM              139 MMOL/L  Unknown

 

          [place] [address]     POTASSIUM           4.6 MMOL/L  Unknown

 

          [place] [address]     CHLORIDE            108 MMOL/L  Unknown

 

          [place] [address]     BICARB              26 MMOL/L  Unknown

 

          [place] [address]     ANION GAP           5 MEQ/L    Unknown

 

          [place] [address]     CALCIUM             10.0 MG/DL  Unknown

 

          GFR CALC  3479280   GFR AA              57.0L ML/MIN 2015 Unknow

n

 

          GFR CALC  4448059   GFR NON-AA           47.0L ML/MIN 2015 Unkno

wn

 

          THYROID STIMULATING HORMONE 35551     TSH                 2.378 uIU/ML

 09/10/2015 Unknown

 

          COMPLETE BLOOD COUNT [id number]   WBC                 6.4 10e9/L 09/10/20

15 Unknown

 

          COMPLETE BLOOD COUNT [id number]   RBC                 3.99 10e12/L 09/10/

2015 Unknown

 

          COMPLETE BLOOD COUNT [id number]   HGB                 11.9 g/dL 09/10/201

5 Unknown

 

          COMPLETE BLOOD COUNT [id number]   HCT DET             36.9 %    09/10/201

5 Unknown

 

          COMPLETE BLOOD COUNT [id number]   MCV                 92.5 fL   09/10/201

5 Unknown

 

          COMPLETE BLOOD COUNT [id number]   MCH                 29.8 pg   09/10/201

5 Unknown

 

          COMPLETE BLOOD COUNT [id number]   MCHC                32.2 g/dL 09/10/201

5 Unknown

 

          COMPLETE BLOOD COUNT [id number]   PLT                 172 10e9/L 09/10/20

15 Unknown

 

          COMPLETE BLOOD COUNT [id number]   MPV                 11.7 fL   09/10/201

5 Unknown

 

          COMPLETE BLOOD COUNT [id number]   ARIK %               40.4 %    09/10/201

5 Unknown

 

          COMPLETE BLOOD COUNT [id number]   LY %                48.0 %    09/10/201

5 Unknown

 

          COMPLETE BLOOD COUNT [id number]   MON %               8.3 %     09/10/201

5 Unknown

 

          COMPLETE BLOOD COUNT [id number]   EOS  %              2.8 %     09/10/201

5 Unknown

 

          COMPLETE BLOOD COUNT [id number]   BASO %              0.5 %     09/10/201

5 Unknown

 

          COMPLETE BLOOD COUNT [id number]   RDW                 13.6 %    09/10/201

5 Unknown

 

          COMPLETE BLOOD COUNT [id number]   ABS ARIK             2.59 10e9/L 09/10/2

015 Unknown

 

          COMPLETE BLOOD COUNT [id number]   ABS LYMPH           3.07 10e9/L 09/10/2

015 Unknown

 

          COMPLETE BLOOD COUNT [id number]   ABS MONO            0.53 10e9/L 09/10/2

015 Unknown

 

          COMPLETE BLOOD COUNT [id number]   ABS EOS             0.18 10e9/L 09/10/2

015 Unknown

 

          COMPLETE BLOOD COUNT [id number]   ABS BASO            0.03 10e9/L 09/10/2

015 Unknown

 

          COMPLETE BLOOD COUNT [id number]   RDW-SD              44.9 fL   09/10/201

5 Unknown

 

          LIPID GROUP [id number]     HDL TEST            42 MG/DL  09/10/2015 Unknown

 

          LIPID GROUP 58254     TRIG                177 MG/DL 09/10/2015 Unknown

 

          LIPID GROUP [id number]     TEST LDL            72 MG/DL  09/10/2015 Unknown

 

          LIPID GROUP 31746     CHOL                149 MG/DL 09/10/2015 Unknown

 

          LIPID GROUP [id number]     RCHOL/HDL           3.55 RATIO 09/10/2015 Unknow

n

 

          LIPID GROUP 09835     NON-HDL CH           107 MG/DL 09/10/2015 Unknow

n

 

          GLYCOSYLATED HEMOGLOBIN TEST 40168     A1C HPLC  86302-6   5.5 %     0

9/10/2015 Unknown

 

          FREE T4   04851     FREE T4             1.39 NG/DL 09/10/2015 Unknown

 

          GFR CALC  0036679   GFR AA              55.0L ML/MIN 09/10/2015 Unknow

n

 

          GFR CALC  3082383   GFR NON-AA           46.0L ML/MIN 09/10/2015 Unkno

wn

 

          COMPREHENSIVE METABOLIC 11867     AST                 17 U/L    09/10/

2015 Unknown

 

          COMPREHENSIVE METABOLIC 20955     ALT                 10 IU/L   09/10/

2015 Unknown

 

          COMPREHENSIVE METABOLIC 14164     BUN                 20 MG/DL  09/10/

2015 Unknown

 

          COMPREHENSIVE METABOLIC 94658     ALBUMIN             3.9 GM/DL 09/10/

2015 Unknown

 

          COMPREHENSIVE METABOLIC 88167     CHLORIDE            111 MMOL/L 09/10

/2015 Unknown

 

          COMPREHENSIVE METABOLIC 96021     BILI TOT            0.4 MG/DL 09/10/

2015 Unknown

 

          COMPREHENSIVE METABOLIC 74254     ALK PHOS            70 U/L    09/10/

2015 Unknown

 

          COMPREHENSIVE METABOLIC 33021     SODIUM              142 MMOL/L 09/10

/2015 Unknown

 

          COMPREHENSIVE METABOLIC 80136     CREATININE           1.16 MG/DL  Unknown

 

          COMPREHENSIVE METABOLIC 61455     CALCIUM             9.4 MG/DL 09/10/

2015 Unknown

 

          COMPREHENSIVE METABOLIC 40156     POTASSIUM           4.6 MMOL/L 09/10

/2015 Unknown

 

          COMPREHENSIVE METABOLIC 59338     PROT TOT            6.2 GM/DL 09/10/

2015 Unknown

 

          COMPREHENSIVE METABOLIC 22372     Glucose             90 MG/DL  09/10/

2015 Unknown

 

          COMPREHENSIVE METABOLIC 06239     BICARB              24 MMOL/L 09/10/

2015 Unknown

 

          COMPREHENSIVE METABOLIC 79091     ANION GAP           7 MEQ/L   09/10/

2015 Unknown

 

          THYROID STIMULATING HORMONE 40379     TSH                 2.427 uIU/ML

 2015 Unknown

 

          LIPID GROUP [id number]     HDL TEST            47 MG/DL  2015 Unknown

 

          LIPID GROUP 26350     TRIG                145 MG/DL 2015 Unknown

 

          LIPID GROUP 66138     TEST LDL            73 MG/DL  2015 Unknown

 

          LIPID GROUP 87195     CHOL                149 MG/DL 2015 Unknown

 

          LIPID GROUP 36034     RCHOL/HDL           3.17 RATIO 2015 Unknow

n

 

          LIPID GROUP 00593     NON-HDL CH           102 MG/DL 2015 Unknow

n

 

          COMPREHENSIVE METABOLIC 23963     AST                 17 U/L    2015 Unknown

 

          COMPREHENSIVE METABOLIC 75425     ALT                 9 IU/L    2015 Unknown

 

          COMPREHENSIVE METABOLIC 82571     BUN                 19 MG/DL  2015 Unknown

 

          COMPREHENSIVE METABOLIC 19186     ALBUMIN             4.3 GM/DL 2015 Unknown

 

          COMPREHENSIVE METABOLIC 84486     CHLORIDE            108 MMOL/L  Unknown

 

          COMPREHENSIVE METABOLIC 15317     BILI TOT            0.5 MG/DL 2015 Unknown

 

          COMPREHENSIVE METABOLIC 40704     ALK PHOS            68 U/L    2015 Unknown

 

          COMPREHENSIVE METABOLIC 69483     SODIUM              140 MMOL/L  Unknown

 

          COMPREHENSIVE METABOLIC 67428     CREATININE           1.08 MG/DL 2015 Unknown

 

          COMPREHENSIVE METABOLIC 85809     CALCIUM             9.9 MG/DL 2015 Unknown

 

          COMPREHENSIVE METABOLIC 19785     POTASSIUM           4.3 MMOL/L  Unknown

 

          COMPREHENSIVE METABOLIC 96554     PROT TOT            7.2 GM/DL 2015 Unknown

 

          COMPREHENSIVE METABOLIC 68662     Glucose             94 MG/DL  2015 Unknown

 

          COMPREHENSIVE METABOLIC 57035     BICARB              26 MMOL/L 2015 Unknown

 

          COMPREHENSIVE METABOLIC 32562     ANION GAP           6 MEQ/L   2015 Unknown

 

          GFR CALC  5856478   GFR AA              60.0L ML/MIN 2015 Unknow

n

 

          GFR CALC  0748638   GFR NON-AA           49.0L ML/MIN 2015 Unkno

wn

 

          GLYCOSYLATED HEMOGLOBIN TEST 68638     A1C HPLC  53672-9   5.6 %     0

3/06/2015 Unknown

 

          COMPLETE BLOOD COUNT 2074773   WBC                 7.2 10e9/L 20

15 Unknown

 

          COMPLETE BLOOD COUNT 6219891   RBC                 4.28 10e12/L 2015 Unknown

 

          COMPLETE BLOOD COUNT 6611511   HGB                 12.8 g/dL 

5 Unknown

 

          COMPLETE BLOOD COUNT 9154681   HCT DET             39.3 %    

5 Unknown

 

          COMPLETE BLOOD COUNT 9824665   MCV                 91.8 fL   

5 Unknown

 

          COMPLETE BLOOD COUNT 9769292   MCH                 29.9 pg   

5 Unknown

 

          COMPLETE BLOOD COUNT 7306685   MCHC                32.6 g/dL 

5 Unknown

 

          COMPLETE BLOOD COUNT [id number]   PLT                 189 10e9/L 20

15 Unknown

 

          COMPLETE BLOOD COUNT 7277469   MPV                 11.2 fL   

5 Unknown

 

          COMPLETE BLOOD COUNT [id number]   ARIK %               38.0 %    

5 Unknown

 

          COMPLETE BLOOD COUNT [id number]   LY %                51.0 %    

5 Unknown

 

          COMPLETE BLOOD COUNT 7494938   MON %               7.7 %     

5 Unknown

 

          COMPLETE BLOOD COUNT 0586020   EOS  %              2.9 %     

5 Unknown

 

          COMPLETE BLOOD COUNT 5701373   BASO %              0.4 %     

5 Unknown

 

          COMPLETE BLOOD COUNT [id number]   RDW                 14.0 %    

5 Unknown

 

          COMPLETE BLOOD COUNT 0358471   ABS ARIK             2.74 10e9/L 

015 Unknown

 

          COMPLETE BLOOD COUNT 8295388   ABS LYMPH           3.67 10e9/L 

015 Unknown

 

          COMPLETE BLOOD COUNT 5109156   ABS MONO            0.55 10e9/L 

015 Unknown

 

          COMPLETE BLOOD COUNT [id number]   ABS EOS             0.21 10e9/L 

015 Unknown

 

          COMPLETE BLOOD COUNT 5555434   ABS BASO            0.03 10e9/L 

015 Unknown

 

          COMPLETE BLOOD COUNT 8207911   RDW-SD              46.1 fL   

5 Unknown

 

          FREE T4   03643     FREE T4             1.14 NG/DL 2015 Unknown

 

          GLYCOSYLATED HEMOGLOBIN TEST 65467     A1C HPLC  68429-6   5.2 %     0

2014 Unknown

 

          FREE T4   52040     FREE T4             1.40 NG/DL 2014 Unknown

 

          GFR CALC  1034699   GFR AA              >60 ML/MIN 2014 Unknown

 

          GFR CALC  8608837   GFR NON-AA           52.0L ML/MIN 2014 Unkno

wn

 

          COMPREHENSIVE METABOLIC 46120     AST                 15 U/L    2014 Unknown

 

          COMPREHENSIVE METABOLIC 62608     ALT                 9 IU/L    2014 Unknown

 

          COMPREHENSIVE METABOLIC 93750     BUN                 17 MG/DL  2014 Unknown

 

          COMPREHENSIVE METABOLIC 39286     ALBUMIN             4.0 GM/DL 2014 Unknown

 

          COMPREHENSIVE METABOLIC 84747     CHLORIDE            112 MMOL/L  Unknown

 

          COMPREHENSIVE METABOLIC 55119     BILI TOT            0.5 MG/DL 2014 Unknown

 

          COMPREHENSIVE METABOLIC 53884     ALK PHOS            66 U/L    2014 Unknown

 

          COMPREHENSIVE METABOLIC 44333     SODIUM              140 MMOL/L  Unknown

 

          COMPREHENSIVE METABOLIC 18769     CREATININE           1.03 MG/DL  Unknown

 

          COMPREHENSIVE METABOLIC 33697     CALCIUM             9.5 MG/DL 2014 Unknown

 

          COMPREHENSIVE METABOLIC 37147     POTASSIUM           4.1 MMOL/L  Unknown

 

          COMPREHENSIVE METABOLIC 04853     PROT TOT            6.2 GM/DL 2014 Unknown

 

          COMPREHENSIVE METABOLIC 10700     Glucose             102 MG/DL 2014 Unknown

 

          COMPREHENSIVE METABOLIC 34927     BICARB              23 MMOL/L 2014 Unknown

 

          COMPREHENSIVE METABOLIC 87177     ANION GAP           5 MEQ/L   2014 Unknown

 

          THYROID STIMULATING HORMONE 51564     TSH                 2.074 uIU/ML

 2014 Unknown

 

          VITAMIN B 12 FOLIC ACID 52064|57717 VIT B 12            423 PG/ML  Unknown

 

          VITAMIN B 12 FOLIC ACID 48928|00098 FOLIC ACID           19.7 NG/ML  Unknown

 

          LIPID GROUP [id number]     HDL TEST            40 MG/DL  2014 Unknown

 

          LIPID GROUP [id number]     TRIG                145 MG/DL 2014 Unknown

 

          LIPID GROUP [id number]     TEST LDL            81 MG/DL  2014 Unknown

 

          LIPID GROUP [id number]     CHOL                150 MG/DL 2014 Unknown

 

          LIPID GROUP [id number]     RCHOL/HDL           3.75 RATIO 2014 Unknow

n

 

          COMPLETE BLOOD COUNT [id number]   WBC                 6.0 10e9/L 20

14 Unknown

 

          COMPLETE BLOOD COUNT [id number]   RBC                 4.26 10e12/L 2014 Unknown

 

          COMPLETE BLOOD COUNT 7182903   HGB                 12.7 g/dL 

4 Unknown

 

          COMPLETE BLOOD COUNT 0002628   HCT DET             38.7 %    

4 Unknown

 

          COMPLETE BLOOD COUNT 9292424   MCV                 90.8 fL   

4 Unknown

 

          COMPLETE BLOOD COUNT 5020736   MCH                 29.8 pg   

4 Unknown

 

          COMPLETE BLOOD COUNT 8857837   MCHC                32.8 g/dL 

4 Unknown

 

          COMPLETE BLOOD COUNT 8393604   PLT                 178 10e9/L 20

14 Unknown

 

          COMPLETE BLOOD COUNT 9022024   MPV                 11.7 fL   

4 Unknown

 

          COMPLETE BLOOD COUNT 2134627   ARIK %               30.5 %    

4 Unknown

 

          COMPLETE BLOOD COUNT [id number]   LY %                55.4 %    

4 Unknown

 

          COMPLETE BLOOD COUNT 5818515   MON %               9.0 %     

4 Unknown

 

          COMPLETE BLOOD COUNT 2435819   EOS  %              4.4 %     

4 Unknown

 

          COMPLETE BLOOD COUNT 4825580   BASO %              0.7 %     

4 Unknown

 

          COMPLETE BLOOD COUNT 0409366   RDW                 13.3 %    

4 Unknown

 

          COMPLETE BLOOD COUNT 8585665   ABS RAIK             1.83 10e9/L 

014 Unknown

 

          COMPLETE BLOOD COUNT 7189439   ABS LYMPH           3.32 10e9/L 

014 Unknown

 

          COMPLETE BLOOD COUNT 9180116   ABS MONO            0.54 10e9/L 

014 Unknown

 

          COMPLETE BLOOD COUNT 9580335   ABS EOS             0.26 10e9/L 

014 Unknown

 

          COMPLETE BLOOD COUNT 8974286   ABS BASO            0.04 10e9/L 

014 Unknown

 

          COMPLETE BLOOD COUNT 2151299   RDW-SD              43.2 fL   

4 Unknown

 

          HEMOGLOBIN A1C (GLYCOSYLATED) 1981920   A1C HPLC  74823-8   5.5 %     

2012 Unknown

 

          COMPLETE BLOOD COUNT 3602957   WBC                 6.0 10e9/L 20

12 Unknown

 

          COMPLETE BLOOD COUNT 4417395   RBC                 4.22 10e12/L 2012 Unknown

 

          COMPLETE BLOOD COUNT 6814615   HGB                 12.4 g/dL 

2 Unknown

 

          COMPLETE BLOOD COUNT 7116736   HCT DET             38.2 %    

2 Unknown

 

          COMPLETE BLOOD COUNT 9569349   MCV                 90.5 fL   

2 Unknown

 

          COMPLETE BLOOD COUNT 2518546   MCH                 29.4 pg   

2 Unknown

 

          COMPLETE BLOOD COUNT 6964564   MCHC                32.5 g/dL 

2 Unknown

 

          COMPLETE BLOOD COUNT 9189006   PLT                 187 10e9/L 20

12 Unknown

 

          COMPLETE BLOOD COUNT 6541539   MPV                 11.5 fL   

2 Unknown

 

          COMPLETE BLOOD COUNT 2748895   ARIK %               36.4 %    

2 Unknown

 

          COMPLETE BLOOD COUNT 4235066   LY %                51.0 %    

2 Unknown

 

          COMPLETE BLOOD COUNT 9709497   MON %               8.7 %     

2 Unknown

 

          COMPLETE BLOOD COUNT 2240104   EOS  %              3.2 %     

2 Unknown

 

          COMPLETE BLOOD COUNT 7154308   BASO %              0.7 %     

2 Unknown

 

          COMPLETE BLOOD COUNT 5441474   RDW                 13.7 %    

2 Unknown

 

          COMPLETE BLOOD COUNT 2928330   ABS ARIK             2.18 10e9/L 

012 Unknown

 

          COMPLETE BLOOD COUNT 8807267   ABS LYMPH           3.06 10e9/L 

012 Unknown

 

          COMPLETE BLOOD COUNT 5157877   ABS MONO            0.52 10e9/L 

012 Unknown

 

          COMPLETE BLOOD COUNT 8669770   ABS EOS             0.19 10e9/L 

012 Unknown

 

          COMPLETE BLOOD COUNT 6228064   ABS BASO            0.04 10e9/L 

012 Unknown

 

          COMPLETE BLOOD COUNT 6901807   RDW-SD              44.3 fL   

2 Unknown

 

          LIPID GROUP [id number]     HDL TEST            42 MG/DL  2012 Unknown

 

          LIPID GROUP [id number]     TRIG                156 MG/DL 2012 Unknown

 

          LIPID GROUP [id number]     TEST LDL            80 MG/DL  2012 Unknown

 

          LIPID GROUP [id number]     CHOL                153 MG/DL 2012 Unknown

 

          LIPID GROUP [id number]     RCHOL/HDL           3.64 RATIO 2012 Unknow

n

 

          FREE T4   23960     FREE T4             1.22 NG/DL 2012 Unknown

 

          COMPREHENSIVE METABOLIC 04543     AST                 20 U/L    2012 Unknown

 

          COMPREHENSIVE METABOLIC 40367     ALT                 11 IU/L   2012 Unknown

 

          COMPREHENSIVE METABOLIC 46739     BUN                 19 MG/DL  2012 Unknown

 

          COMPREHENSIVE METABOLIC 59083     ALBUMIN             4.3 GM/DL 2012 Unknown

 

          COMPREHENSIVE METABOLIC 79800     CHLORIDE            109 MMOL/L  Unknown

 

          COMPREHENSIVE METABOLIC 64397     BILI TOT            0.6 MG/DL 2012 Unknown

 

          COMPREHENSIVE METABOLIC 18262     ALK PHOS            84 U/L    2012 Unknown

 

          COMPREHENSIVE METABOLIC 31133     SODIUM              142 MMOL/L  Unknown

 

          COMPREHENSIVE METABOLIC 33819     CREATININE           1.09 MG/DL  Unknown

 

          COMPREHENSIVE METABOLIC 21687     CALCIUM             9.8 MG/DL 2012 Unknown

 

          COMPREHENSIVE METABOLIC 92062     POTASSIUM           4.2 MMOL/L  Unknown

 

          COMPREHENSIVE METABOLIC 28286     PROT TOT            6.4 GM/DL 2012 Unknown

 

          COMPREHENSIVE METABOLIC 28841     Glucose             89 MG/DL  2012 Unknown

 

          COMPREHENSIVE METABOLIC 25738     BICARB              25 MMOL/L 2012 Unknown

 

          COMPREHENSIVE METABOLIC 90378     ANION GAP           8 MEQ/L   2012 Unknown

 

          GFR CALC  6859128   GFR AA              60.0L ML/MIN 2012 Unknow

n

 

          GFR CALC  8278643   GFR NON-AA           49.0L ML/MIN 2012 Unkno

wn

 

          THYROID STIMULATING HORMONE 77895     TSH                 2.450 uIU/ML

 2012 Unknown

 

          COMPREHENSIVE METABOLIC 63505     AST                 22 U/L    2012 Unknown

 

          COMPREHENSIVE METABOLIC 66639     ALT                 14 IU/L   2012 Unknown

 

          COMPREHENSIVE METABOLIC 49448     BUN                 21 MG/DL  2012 Unknown

 

          COMPREHENSIVE METABOLIC 07873     ALBUMIN             4.3 GM/DL 2012 Unknown

 

          COMPREHENSIVE METABOLIC 04488     CHLORIDE            106 MMOL/L  Unknown

 

          COMPREHENSIVE METABOLIC 02014     BILI TOT            0.4 MG/DL 2012 Unknown

 

          COMPREHENSIVE METABOLIC 86679     ALK PHOS            80 U/L    2012 Unknown

 

          COMPREHENSIVE METABOLIC 75037     SODIUM              141 MMOL/L  Unknown

 

          COMPREHENSIVE METABOLIC 60987     CREATININE           1.13 MG/DL  Unknown

 

          COMPREHENSIVE METABOLIC 35393     CALCIUM             9.4 MG/DL 2012 Unknown

 

          COMPREHENSIVE METABOLIC 78538     POTASSIUM           4.3 MMOL/L  Unknown

 

          COMPREHENSIVE METABOLIC 34665     PROT TOT            6.7 GM/DL 2012 Unknown

 

          COMPREHENSIVE METABOLIC 07943     Glucose             98 MG/DL  2012 Unknown

 

          COMPREHENSIVE METABOLIC 42342     BICARB              25 MMOL/L 2012 Unknown

 

          COMPREHENSIVE METABOLIC 22722     ANION GAP           10 MEQ/L  2012 Unknown

 

          LIPID GROUP 62057     HDL TEST            44 MG/DL  2012 Unknown

 

          LIPID GROUP 93625     TRIG                164 MG/DL 2012 Unknown

 

          LIPID GROUP 23476     TEST LDL            98 MG/DL  2012 Unknown

 

          LIPID GROUP 71121     CHOL                175 MG/DL 2012 Unknown

 

          LIPID GROUP [id number]     RCHOL/HDL           3.98 RATIO 2012 Unknow

n

 

          COMPLETE BLOOD COUNT 72647     WBC                 6.7 10e9/L 20

12 Unknown

 

          COMPLETE BLOOD COUNT 19376     RBC                 4.36 10e12/L 2012 Unknown

 

          COMPLETE BLOOD COUNT 60929     HGB                 12.9 g/dL 

2 Unknown

 

          COMPLETE BLOOD COUNT 03674     HCT DET             39.4 %    

2 Unknown

 

          COMPLETE BLOOD COUNT 77875     MCV                 90.4 fL   

2 Unknown

 

          COMPLETE BLOOD COUNT 28981     MCH                 29.6 pg   

2 Unknown

 

          COMPLETE BLOOD COUNT 72397     MCHC                32.7 g/dL 

2 Unknown

 

          COMPLETE BLOOD COUNT 39183     PLT                 184 10e9/L 20

12 Unknown

 

          COMPLETE BLOOD COUNT 80006     MPV                 10.9 fL   

2 Unknown

 

          COMPLETE BLOOD COUNT 04750     ARIK %               41.5 %    

2 Unknown

 

          COMPLETE BLOOD COUNT 75624     LY %                45.7 %    

2 Unknown

 

          COMPLETE BLOOD COUNT 14245     MON %               9.4 %     

2 Unknown

 

          COMPLETE BLOOD COUNT 68153     EOS  %              3.0 %     

2 Unknown

 

          COMPLETE BLOOD COUNT 89903     BASO %              0.4 %     

2 Unknown

 

          COMPLETE BLOOD COUNT 70402     RDW                 13.2 %    

2 Unknown

 

          COMPLETE BLOOD COUNT 46683     ABS ARIK             2.78 10e9/L 

012 Unknown

 

          COMPLETE BLOOD COUNT 60490     ABS LYMPH           3.06 10e9/L 

012 Unknown

 

          COMPLETE BLOOD COUNT 81083     ABS MONO            0.63 10e9/L 

012 Unknown

 

          COMPLETE BLOOD COUNT 67213     ABS EOS             0.20 10e9/L 

012 Unknown

 

          COMPLETE BLOOD COUNT 14849     ABS BASO            0.03 10e9/L 

012 Unknown

 

          COMPLETE BLOOD COUNT 01161     RDW-SD              42.3 fL   

2 Unknown

 

          GFR CALC  8242932   GFR AA              57.0L ML/MIN 2012 Unknow

n

 

          GFR CALC  6077714   GFR NON-AA           47.0L ML/MIN 2012 Unkno

wn

 

          THYROID STIMULATING HORMONE 72432     TSH                 2.663 uIU/ML

 2012 Unknown

 

          FREE T4   94433     FREE T4             1.15 NG/DL 2012 Unknown

 

          THYROID STIMULATING HORMONE 38955     TSH                 1.908 uIU/ML

 2011 Unknown

 

          COMPLETE BLOOD COUNT 74079     WBC                 6.4 10e9/L 20

11 Unknown

 

          COMPLETE BLOOD COUNT 39586     RBC                 3.92 10e12/L 2011 Unknown

 

          COMPLETE BLOOD COUNT 69274     HGB                 11.8 g/dL 

1 Unknown

 

          COMPLETE BLOOD COUNT 98895     HCT DET             36.0 %    

1 Unknown

 

          COMPLETE BLOOD COUNT 27374     MCV                 91.8 fL   

1 Unknown

 

          COMPLETE BLOOD COUNT 62748     MCH                 30.1 pg   

1 Unknown

 

          COMPLETE BLOOD COUNT 04532     MCHC                32.8 g/dL 

1 Unknown

 

          COMPLETE BLOOD COUNT 77254     PLT                 176 10e9/L 20

11 Unknown

 

          COMPLETE BLOOD COUNT 03855     MPV                 11.4 fL   

1 Unknown

 

          COMPLETE BLOOD COUNT 29509     ARIK %               50.4 %    

1 Unknown

 

          COMPLETE BLOOD COUNT 56803     LY %                35.5 %    

1 Unknown

 

          COMPLETE BLOOD COUNT 96542     MON %               10.2 %    

1 Unknown

 

          COMPLETE BLOOD COUNT 46625     EOS  %              3.3 %     

1 Unknown

 

          COMPLETE BLOOD COUNT 54422     BASO %              0.6 %     

1 Unknown

 

          COMPLETE BLOOD COUNT 75316     RDW                 13.7 %    

1 Unknown

 

          COMPLETE BLOOD COUNT 44602     ABS ARIK             3.23 10e9/L 

011 Unknown

 

          COMPLETE BLOOD COUNT 04837     ABS LYMPH           2.27 10e9/L 

011 Unknown

 

          COMPLETE BLOOD COUNT 32279     ABS MONO            0.65 10e9/L 

011 Unknown

 

          COMPLETE BLOOD COUNT 29638     ABS EOS             0.21 10e9/L 

011 Unknown

 

          COMPLETE BLOOD COUNT 74813     ABS BASO            0.04 10e9/L 

011 Unknown

 

          COMPLETE BLOOD COUNT 10425     RDW-SD              45.3 fL   

1 Unknown

 

          GFR CALC  3963294   GFR AA              >60 ML/MIN 2011 Unknown

 

          GFR CALC  2411452   GFR NON-AA           53.0L ML/MIN 2011 Unkno

wn

 

          FREE T4   61209     FREE T4             1.20 NG/DL 2011 Unknown

 

          COMPREHENSIVE METABOLIC 89311     AST                 17 U/L    2011 Unknown

 

          COMPREHENSIVE METABOLIC 05843     ALT                 9 IU/L    2011 Unknown

 

          COMPREHENSIVE METABOLIC 43252     BUN                 16 MG/DL  2011 Unknown

 

          COMPREHENSIVE METABOLIC 87697     ALBUMIN             4.0 GM/DL 2011 Unknown

 

          COMPREHENSIVE METABOLIC 14645     CHLORIDE            108 MMOL/L  Unknown

 

          COMPREHENSIVE METABOLIC 28001     BILI TOT            0.5 MG/DL 2011 Unknown

 

          COMPREHENSIVE METABOLIC 99401     ALK PHOS            76 U/L    2011 Unknown

 

          COMPREHENSIVE METABOLIC 15546     SODIUM              139 MMOL/L  Unknown

 

          COMPREHENSIVE METABOLIC 63706     CREATININE           1.02 MG/DL 2011 Unknown

 

          COMPREHENSIVE METABOLIC 11541     CALCIUM             9.2 MG/DL 2011 Unknown

 

          COMPREHENSIVE METABOLIC 13197     POTASSIUM           4.5 MMOL/L  Unknown

 

          COMPREHENSIVE METABOLIC 95209     PROT TOT            6.1 GM/DL 2011 Unknown

 

          COMPREHENSIVE METABOLIC 77934     Glucose             93 MG/DL  2011 Unknown

 

          COMPREHENSIVE METABOLIC 88635     BICARB              26 MMOL/L 2011 Unknown

 

          COMPREHENSIVE METABOLIC 45270     ANION GAP           5 MEQ/L   2011 Unknown

 

          LIPID GROUP 38961     HDL TEST            46 MG/DL  2011 Unknown

 

          LIPID GROUP 71185     TRIG                102 MG/DL 2011 Unknown

 

          LIPID GROUP 87709     TEST LDL            88 MG/DL  2011 Unknown

 

          LIPID GROUP 14646     CHOL                154 MG/DL 2011 Unknown

 

          LIPID GROUP 61548     RCHOL/HDL           3.35 RATIO 2011 Unknow

n







Procedures





                    Procedure           Codes               Date

 

                    ROUTINE VENIPUNCTURE CPT-4: 39051        2020

 

                    ASSAY THYROID STIM HORMONE CPT-4: 45248        2020

 

                    COMPLETE CBC W/AUTO DIFF WBC CPT-4: 72975        2020

 

                    COMPREHEN METABOLIC PANEL CPT-4: 39291        2020

 

                    ROUTINE VENIPUNCTURE CPT-4: 17237        2019

 

                    LIPID PANEL         CPT-4: 87015        2019

 

                    FLU VACC PRSV FREE INC ANTIG 65 AND OLDER CPT-4: 16822      

  10/22/2019

 

                    FLU VACC PRSV FREE INC ANTIG 65 AND OLDER CPT-4: 27473      

  10/22/2019

 

                    ADMIN INFLUENZA VIRUS VAC CPT-4:         10/22/2019

 

                    COMPREHEN METABOLIC PANEL CPT-4: 20018        10/11/2019

 

                    ROUTINE VENIPUNCTURE CPT-4: 39936        10/11/2019

 

                    ROUTINE VENIPUNCTURE CPT-4: 28796        06/10/2019

 

                    ASSAY THYROID STIM HORMONE CPT-4: 56332        06/10/2019

 

                    COMPREHEN METABOLIC PANEL CPT-4: 91414        06/10/2019

 

                    COMPLETE CBC W/AUTO DIFF WBC CPT-4: 20304        06/10/2019

 

                    URINALYSIS NONAUTO W/O SCOPE CPT-4: 84192        2019

 

                    URINE CULTURE/ COLONY COUNT CPT-4: 49584        2019

 

                    URINE CULTURE/ COLONY COUNT CPT-4: 64971        10/02/2018

 

                    ROUTINE VENIPUNCTURE CPT-4: 24321        2018

 

                    ASSAY OF FREE THYROXINE CPT-4: 91115        2018

 

                    ASSAY THYROID STIM HORMONE CPT-4: 36228        2018

 

                    COMPLETE CBC W/AUTO DIFF WBC CPT-4: 15996        2018

 

                    METABOLIC PANEL TOTAL CA CPT-4: 27322        2018

 

                    FLU VACC PRSV FREE INC ANTIG 65 AND OLDER CPT-4: 18083      

  2018

 

                    ASSAY, GLUCOSE, BLOOD QUANT CPT-4: 26723        2018

 

                    ADMIN INFLUENZA VIRUS VAC CPT-4:         2018

 

                    ROUTINE VENIPUNCTURE CPT-4: 74786        2018

 

                    COMPREHEN METABOLIC PANEL CPT-4: 79770        2018

 

                    COMPLETE CBC W/AUTO DIFF WBC CPT-4: 08314        2018

 

                    A1C HPLC            CPT-4: 72360        2018

 

                    ASSAY OF TROPONIN QUANT CPT-4: 84367        2018

 

                    LIPID PANEL         CPT-4: 00733        2018

 

                    THER/PROPH/DIAG INJ SC/IM CPT-4: 13609        2018

 

                    TRIAMCINOLONE ACET INJ NOS CPT-4:         2018

 

                    URINALYSIS NONAUTO W/O SCOPE CPT-4: 89304        2018

 

                    URINE CULTURE/ COLONY COUNT CPT-4: 21645        2018

 

                    FLU VACC PRSV FREE INC ANTIG 65 AND OLDER CPT-4: 66448      

  10/06/2017

 

                    ADMIN INFLUENZA VIRUS VAC CPT-4:         10/06/2017

 

                    ROUTINE VENIPUNCTURE CPT-4: 25839        2017

 

                    COMPREHEN METABOLIC PANEL CPT-4: 20393        2017

 

                    COMPLETE CBC W/AUTO DIFF WBC CPT-4: 15631        2017

 

                    LIPID PANEL         CPT-4: 97895        2017

 

                    A1C HPLC            CPT-4: 89606        2017

 

                    ASSAY THYROID STIM HORMONE CPT-4: 76107        2017

 

                    ROUTINE VENIPUNCTURE CPT-4: 62783        2017

 

                    ASSAY THYROID STIM HORMONE CPT-4: 16867        2017

 

                    COMPREHEN METABOLIC PANEL CPT-4: 56577        2017

 

                    COMPLETE CBC W/AUTO DIFF WBC CPT-4: 61252        2017

 

                    A1C HPLC            CPT-4: 54865        2017

 

                    FLU VACC PRSV FREE INC ANTIG 65 AND OLDER CPT-4: 84744      

  10/07/2016

 

                    ADMIN INFLUENZA VIRUS VAC CPT-4:         10/07/2016

 

                    ROUTINE VENIPUNCTURE CPT-4: 05486        2016

 

                    ASSAY OF FREE THYROXINE CPT-4: 90467        2016

 

                    ASSAY THYROID STIM HORMONE CPT-4: 24148        2016

 

                    COMPREHEN METABOLIC PANEL CPT-4: 81509        2016

 

                    COMPLETE CBC W/AUTO DIFF WBC CPT-4: 66238        2016

 

                    LIPID PANEL         CPT-4: 17857        2016

 

                    A1C HPLC            CPT-4: 72502        2016

 

                    URINALYSIS NONAUTO W/O SCOPE CPT-4: 92180        2016

 

                    ROUTINE VENIPUNCTURE CPT-4: 75679        2015

 

                    METABOLIC PANEL TOTAL CA CPT-4: 84305        2015

 

                    PRESCRIP TRANSMIT VIA ERX SY CPT-4:         2015

 

                    FLU VACC PRSV FREE INC ANTIG 65 AND OLDER CPT-4: 23674      

  10/16/2015

 

                    ADMIN INFLUENZA VIRUS VAC CPT-4:         10/16/2015

 

                    URINALYSIS NONAUTO W/O SCOPE CPT-4: 58584        09/15/2015

 

                    URINE CULTURE/ COLONY COUNT CPT-4: 89166        09/15/2015

 

                    ROUTINE VENIPUNCTURE CPT-4: 68669        09/10/2015

 

                    ASSAY OF FREE THYROXINE CPT-4: 38199        09/10/2015

 

                    ASSAY THYROID STIM HORMONE CPT-4: 03200        09/10/2015

 

                    COMPREHEN METABOLIC PANEL CPT-4: 39837        09/10/2015

 

                    COMPLETE CBC W/AUTO DIFF WBC CPT-4: 26654        09/10/2015

 

                    LIPID PANEL         CPT-4: 32700        09/10/2015

 

                    A1C HPLC            CPT-4: 83735        09/10/2015

 

                    CERUM REMOVAL       CPT-4: 53001        2015

 

                    PRESCRIP TRANSMIT VIA ERX SY CPT-4:         2015

 

                    FLUZONE, 5ML (Medicare) CPT-4:         10/17/2014

 

                    ADMIN INFLUENZA VIRUS VAC CPT-4:         10/17/2014

 

                    PRESCRIP TRANSMIT VIA ERX SY CPT-4:         2014

 

                    PRESCRIP TRANSMIT VIA ERX SY CPT-4:         2014

 

                    PRESCRIP TRANSMIT VIA ERX SY CPT-4:         2014

 

                    ROUTINE VENIPUNCTURE CPT-4: 27598        2014

 

                    ASSAY OF FREE THYROXINE CPT-4: 02912        2014

 

                    ASSAY THYROID STIM HORMONE CPT-4: 49937        2014

 

                    COMPREHEN METABOLIC PANEL CPT-4: 20741        2014

 

                    COMPLETE CBC W/AUTO DIFF WBC CPT-4: 48199        2014

 

                    LIPID PANEL         CPT-4: 03754        2014

 

                    A1C HPLC            CPT-4: 11595        2014

 

                    VITAMIN B 12 FOLIC ACID CPT-4: 18434|47441  2014

 

                    PRESCRIP TRANSMIT VIA ERX SY CPT-4:         2014

 

                    PRESCRIP TRANSMIT VIA ERX SY CPT-4:         10/15/2013

 

                    FLUZONE, 5ML (Medicare) CPT-4:         2013

 

                    ADMIN INFLUENZA VIRUS VAC CPT-4:         2013

 

                    PRESCRIP TRANSMIT VIA ERX SY CPT-4:         2013

 

                    PRESCRIP TRANSMIT VIA ERX SY CPT-4:         05/10/2013

 

                    ROUTINE VENIPUNCTURE CPT-4: 98033        2012

 

                    ASSAY OF FREE THYROXINE CPT-4: 01039        2012

 

                    ASSAY THYROID STIM HORMONE CPT-4: 54290        2012

 

                    COMPREHEN METABOLIC PANEL CPT-4: 90319        2012

 

                    COMPLETE CBC W/AUTO DIFF WBC CPT-4: 14332        2012

 

                    LIPID PANEL         CPT-4: 17144        2012

 

                    A1C GLYCOSYLATED HEMOGLOBIN TEST CPT-4: 15407        

012

 

                    CERUM REMOVAL       CPT-4: 07019        2012

 

                    PRESCRIP TRANSMIT VIA ERX SY CPT-4:         2012

 

                    PRESCRIP TRANSMIT VIA ERX SY CPT-4:         10/10/2012

 

                    FLUZONE, 5ML (Medicare) CPT-4:         2012

 

                    ADMIN INFLUENZA VIRUS VAC CPT-4:         2012

 

                    ASSAY, GLUCOSE, BLOOD QUANT CPT-4: 91076        2012

 

                    URINALYSIS NONAUTO W/O SCOPE CPT-4: 67279        2012

 

                    URINE CULTURE/ COLONY COUNT CPT-4: 40802        2012

 

                    ROUTINE VENIPUNCTURE CPT-4: 75219        2012

 

                    ASSAY OF FREE THYROXINE CPT-4: 11944        2012

 

                    ASSAY THYROID STIM HORMONE CPT-4: 00760        2012

 

                    COMPREHEN METABOLIC PANEL CPT-4: 07380        2012

 

                    COMPLETE CBC W/AUTO DIFF WBC CPT-4: 17827        2012

 

                    LIPID PANEL         CPT-4: 42709        2012

 

                    ASSAY OF INSULIN    CPT-4: 35471        2012

 

                    A1C GLYCOSYLATED HEMOGLOBIN TEST CPT-4: 05909        

012

 

                    DRAIN/INJECT JOINT/BURSA CPT-4: 29035        2012

 

                    METHYLPREDNISOLONE 40 MG INJ CPT-4:         2012

 

                    TRIAMCINOLONE ACET INJ NOS CPT-4:         2012

 

                    PRESCRIP TRANSMIT VIA ERX SY CPT-4:         2012

 

                    PRESCRIP TRANSMIT VIA ERX SY CPT-4:         2012

 

                    METHYLPREDNISOLONE 40 MG INJ CPT-4:         2012

 

                    DRAIN/INJECT JOINT/BURSA CPT-4: 99459        2012

 

                    TRIAMCINOLONE ACET INJ NOS CPT-4:         2012

 

                    PRESCRIP TRANSMIT VIA ERX SY CPT-4:         2012

 

                    ROUTINE VENIPUNCTURE CPT-4: 76991        2012

 

                    ASSAY OF FREE THYROXINE CPT-4: 74031        2012

 

                    ASSAY THYROID STIM HORMONE CPT-4: 67758        2012

 

                    COMPREHEN METABOLIC PANEL CPT-4: 32825        2012

 

                    COMPLETE CBC W/AUTO DIFF WBC CPT-4: 35105        2012

 

                    LIPID PANEL         CPT-4: 86926        2012

 

                    PRESCRIP TRANSMIT VIA ERX SY CPT-4:         2012

 

                    CERUM REMOVAL       CPT-4: 72241        2011

 

                    PRESCRIP TRANSMIT VIA ERX SY CPT-4:         2011

 

                    FLUZONE, 5ML (Medicare) CPT-4:         10/05/2011

 

                    ADMIN INFLUENZA VIRUS VAC CPT-4:         10/05/2011

 

                    PRESCRIP TRANSMIT VIA ERX SY CPT-4:         2011

 

                    URINALYSIS NONAUTO W/O SCOPE CPT-4: 88410        2011

 

                    URINE CULTURE/ COLONY COUNT CPT-4: 60734        2011

 

                    CUR TOBACCO NON-USER CPT-4:         2011

 

                    ROUTINE VENIPUNCTURE CPT-4: 10051        2011

 

                    COMPLETE CBC W/AUTO DIFF WBC CPT-4: 66637        2011

 

                    COMPREHEN METABOLIC PANEL CPT-4: 46965        2011

 

                    LIPID PANEL         CPT-4: 41043        2011

 

                    ASSAY THYROID STIM HORMONE CPT-4: 31117        2011

 

                    ASSAY OF FREE THYROXINE CPT-4: 96039        2011

 

                    PRESCRIP TRANSMIT VIA ERX SY CPT-4:         2011

 

                    INJ TRIGGER POINT 1/2 MUSCL CPT-4: 38404        2011

 

                    TRIAMCINOLONE ACET INJ NOS CPT-4:         2011

 

                    METHYLPREDNISOLONE 40 MG INJ CPT-4:         2011

 

                    THER/PROPH/DIAG INJ SC/IM CPT-4: 72731        2011

 

                    KETOROLAC TROMETHAMINE INJ CPT-4:         2011

 

                    PRESCRIP TRANSMIT VIA ERX SY CPT-4:         2010

 

                    FLU VACCINE 3 YRS & > IM UP 64 CPT-4: 39734        10/06/201

0

 

                    ADMIN INFLUENZA VIRUS VAC CPT-4:         10/06/2010

 

                    URINALYSIS NONAUTO W/O SCOPE CPT-4: 92341        2010

 

                    URINE CULTURE/ COLONY COUNT CPT-4: 36522        2010

 

                    PRESCRIP TRANSMIT VIA ERX SY CPT-4:         2010

 

                    THER/PROPH/DIAG INJ SC/IM CPT-4: 27512        2010

 

                    VITAMIN B12 INJECTION CPT-4:         2010

 

                    THER/PROPH/DIAG INJ SC/IM CPT-4: 09627        2010

 

                    VITAMIN B12 INJECTION CPT-4:         2010

 

                    ROUTINE VENIPUNCTURE CPT-4: 90258        2010







Vital Signs





                          Date                      Vital

 

                2020      Blood Pressure 1: 148/66 Code: 8480-6 Heart Rate

 1: 56 bpm 

Respiratory Rate: 20 bpm SpO2: 99%           Temperature: 36.6 (C) / 97.8 (F) We

ight: 211 

lbs 

 

                2020      Blood Pressure 1: 138/64 Code: 8480-6 Heart Rate

 1: 52 bpm 

Respiratory Rate: 18 bpm  SpO2: 98%                 Temperature: 36.3 (C) / 97.4

 (F)

 

                    2020          Blood Pressure 1: 144/82 Code: 8480-6 He

art Rate 1: 56 bpm

 

                2020      Blood Pressure 1: 154/86 Code: 8480-6 Heart Rate

 1: 64 bpm 

Respiratory Rate: 20 bpm SpO2: 99%           Temperature: 37.1 (C) / 98.7 (F) We

ight: 215 

lbs 

 

             2019   Blood Pressure 1: 140/70 Code: 8480-6 Heart Rate 1: 57

 bpm SpO2: 99%    

Temperature: 35.9 (C) / 96.6 (F)        Weight: 215 lbs 

 

                06/10/2019      Blood Pressure 1: 144/70 Code: 8480-6 Heart Rate

 1: 60 bpm 

Respiratory Rate: 20 bpm SpO2: 95%           Temperature: 36.4 (C) / 97.6 (F) We

ight: 214 

lbs 

 

                2019      Blood Pressure 1: 128/78 Code: 8480-6 Heart Rate

 1: 56 bpm 

Respiratory Rate: 20 bpm SpO2: 98%           Temperature: 36.3 (C) / 97.4 (F) We

ight: 213 

lbs 

 

                2018      Blood Pressure 1: 142/60 Code: 8480-6 BMI: 38.2 

Code: 64162-4 Heart 

Rate 1: 48 bpm  Height: 5'2"    Respiratory Rate: 20 bpm SpO2: 98%       Tempera

ture: 36.7

(C) / 98.1 (F)                          Weight: 212 lbs 

 

                2018      Blood Pressure 1: 142/64 Code: 8480-6 BMI: 38.5 

Code: 67232-0 Heart 

Rate 1: 52 bpm  Height: 5'2"    Respiratory Rate: 22 bpm SpO2: 96%       Tempera

ture: 36.1

(C) / 96.9 (F)                          Weight: 214 lbs 

 

                10/02/2018      Blood Pressure 1: 124/80 Code: 8480-6 BMI: 38.3 

Code: 33174-2 Heart 

Rate 1: 68 bpm      Height: 5'2"        Respiratory Rate: 20 bpm Temperature: 36

.3 (C) / 

97.4 (F)                                Weight: 213 lbs 

 

                2018      Blood Pressure 1: 132/78 Code: 8480-6 BMI: 37.6 

Code: 70879-3 Heart 

Rate 1: 68 bpm  Height: 5'2"    Respiratory Rate: 20 bpm SpO2: 97%       Tempera

ture: 36.8

(C) / 98.2 (F)                          Weight: 209 lbs 

 

                2018      Blood Pressure 1: 150/76 Code: 8480-6 BMI: 38.5 

Code: 73876-8 Heart 

Rate 1: 64 bpm  Height: 5'2"    Respiratory Rate: 20 bpm SpO2: 97%       Tempera

ture: 36.2

(C) / 97.2 (F)                          Weight: 214 lbs 

 

                2018      Blood Pressure 1: 122/74 Code: 8480-6 BMI: 38.2 

Code: 39122-2 Heart 

Rate 1: 64 bpm  Height: 5'2"    Respiratory Rate: 18 bpm SpO2: 96%       Tempera

ture: 35.8

(C) / 96.4 (F)                          Weight: 212 lbs 

 

                2018      Blood Pressure 1: 124/78 Code: 8480-6 BMI: 37.8 

Code: 60235-4 Heart 

Rate 1: 76 bpm      Height: 5'2"        Respiratory Rate: 20 bpm Temperature: 36

.8 (C) / 

98.3 (F)                                Weight: 210 lbs 

 

                2018      Blood Pressure 1: 136/70 Code: 8480-6 BMI: 38.0 

Code: 87186-2 Heart 

Rate 1: 68 bpm  Height: 5'2"    Respiratory Rate: 20 bpm SpO2: 97%       Tempera

ture: 36.8

(C) / 98.2 (F)                          Weight: 211 lbs 

 

                2018      Blood Pressure 1: 140/65 Code: 8480-6 Heart Rate

 1: 75 bpm 

Respiratory Rate: 24 bpm SpO2: 95%           Temperature: 37.0 (C) / 98.6 (F) We

ight: 211 

lbs 

 

                2018      Blood Pressure 1: 154/70 Code: 8480-6 BMI: 37.6 

Code: 72866-8 Heart 

Rate 1: 76 bpm  Height: 5'2"    Respiratory Rate: 20 bpm SpO2: 98%       Tempera

ture: 36.9

(C) / 98.5 (F)                          Weight: 209 lbs 

 

                2017      Blood Pressure 1: 156/70 Code: 8480-6 BMI: 37.1 

Code: 23501-1 Heart 

Rate 1: 72 bpm  Height: 5'2"    Respiratory Rate: 20 bpm SpO2: 97%       Tempera

ture: 37.0

(C) / 98.6 (F)                          Weight: 206 lbs 

 

                2017      Blood Pressure 1: 152/78 Code: 8480-6 BMI: 36.8 

Code: 08378-3 Heart 

Rate 1: 78 bpm  Height: 5'2"    Respiratory Rate: 20 bpm SpO2: 98%       Tempera

ture: 36.1

(C) / 97.0 (F)                          Weight: 204 lbs 

 

                2017      Blood Pressure 1: 142/70 Code: 8480-6 BMI: 36.9 

Code: 46608-7 Heart 

Rate 1: 64 bpm  Height: 5'2"    Respiratory Rate: 20 bpm SpO2: 96%       Tempera

ture: 36.5

(C) / 97.7 (F)                          Weight: 205 lbs 

 

                2017      Blood Pressure 1: 142/68 Code: 8480-6 Heart Rate

 1: 88 bpm 

Respiratory Rate: 18 bpm SpO2: 98%           Temperature: 36.3 (C) / 97.3 (F) We

ight: 209 

lbs 

 

                2016      Blood Pressure 1: 130/78 Code: 8480-6 Heart Rate

 1: 68 bpm 

Respiratory Rate: 20 bpm SpO2: 95%           Temperature: 36.4 (C) / 97.6 (F) We

ight: 212 

lbs 

 

                2016      Blood Pressure 1: 122/64 Code: 8480-6 BMI: 39.1 

Code: 04392-0 Heart 

Rate 1: 76 bpm      Height: 5'2"        Respiratory Rate: 20 bpm Temperature: 36

.8 (C) / 

98.2 (F)                                Weight: 217 lbs 

 

                2016      Blood Pressure 1: 144/70 Code: 8480-6 BMI: 39.4 

Code: 08941-1 Heart 

Rate 1: 76 bpm      Height: 5'2"        Respiratory Rate: 20 bpm Temperature: 36

.6 (C) / 

97.9 (F)                                Weight: 219 lbs 

 

                2015      Blood Pressure 1: 152/60 Code: 8480-6 BMI: 39.6 

Code: 84095-5 Heart 

Rate 1: 84 bpm      Height: 5'2"        Respiratory Rate: 20 bpm Temperature: 37

.0 (C) / 

98.6 (F)                                Weight: 220 lbs 

 

                2015      Blood Pressure 1: 146/76 Code: 8480-6 BMI: 39.8 

Code: 60053-2 Heart 

Rate 1: 88 bpm      Height: 5'2"        Respiratory Rate: 20 bpm Temperature: 37

.0 (C) / 

98.6 (F)                                Weight: 221 lbs 

 

                2015      Blood Pressure 1: 132/70 Code: 8480-6 BMI: 39.1 

Code: 70027-0 Heart 

Rate 1: 88 bpm      Height: 5'2"        Respiratory Rate: 20 bpm Temperature: 36

.4 (C) / 

97.6 (F)                                Weight: 217 lbs 

 

                2015      Blood Pressure 1: 132/66 Code: 8480-6 BMI: 39.9 

Code: 48220-7 Heart 

Rate 1: 72 bpm      Height: 5'2"        Respiratory Rate: 20 bpm Temperature: 36

.9 (C) / 

98.4 (F)                                Weight: 218 lbs 

 

                2015      Blood Pressure 1: 136/80 Code: 8480-6 Heart Rate

 1: 76 bpm 

Respiratory Rate: 20 bpm  Temperature: 36.7 (C) / 98.0 (F) Weight: 224 lbs 

 

                2015      Blood Pressure 1: 134/78 Code: 8480-6 BMI: 39.7 

Code: 44550-6 Heart 

Rate 1: 84 bpm      Height: 5'2"        Respiratory Rate: 20 bpm Temperature: 36

.7 (C) / 

98.0 (F)                                Weight: 217 lbs 

 

                2014      Blood Pressure 1: 146/78 Code: 8480-6 BMI: 39.5 

Code: 73628-7 Heart 

Rate 1: 82 bpm      Height: 5'2"        Respiratory Rate: 18 bpm Temperature: 35

.6 (C) / 

96.1 (F)                                Weight: 216 lbs 

 

                2014      Blood Pressure 1: 134/70 Code: 8480-6 BMI: 37.9 

Code: 02174-4 Heart 

Rate 1: 80 bpm      Height: 5'3"        Respiratory Rate: 20 bpm Temperature: 36

.8 (C) / 

98.2 (F)                                Weight: 214 lbs 

 

                2014      Blood Pressure 1: 132/70 Code: 8480-6 BMI: 37.6 

Code: 72632-5 Heart 

Rate 1: 80 bpm      Height: 5'3"        Respiratory Rate: 20 bpm Temperature: 36

.8 (C) / 

98.3 (F)                                Weight: 212 lbs 

 

                2014      Blood Pressure 1: 116/74 Code: 8480-6 Heart Rate

 1: 68 bpm 

Respiratory Rate: 20 bpm  Temperature: 36.2 (C) / 97.1 (F) Weight: 212 lbs 

 

                10/15/2013      Blood Pressure 1: 132/82 Code: 8480-6 BMI: 37.4 

Code: 08075-6 Heart 

Rate 1: 76 bpm      Height: 5'3"        Respiratory Rate: 20 bpm Temperature: 36

.7 (C) / 

98.0 (F)                                Weight: 211 lbs 

 

                    2013          Blood Pressure 1: 130/76 Code: 8480-6 He

art Rate 1: 78 bpm

 

                2013      Blood Pressure 1: 140/82 Code: 8480-6 BMI: 36.8 

Code: 69964-0 Heart 

Rate 1: 66 bpm      Height: 5'3"        Respiratory Rate: 20 bpm Temperature: 36

.1 (C) / 

96.9 (F)                                Weight: 208 lbs 

 

                2013      Blood Pressure 1: 138/80 Code: 8480-6 BMI: 36.4 

Code: 58920-0 Heart 

Rate 1: 72 bpm      Height: 5'4"        Respiratory Rate: 20 bpm Temperature: 36

.7 (C) / 

98.0 (F)                                Weight: 212 lbs 

 

                2013      Blood Pressure 1: 124/70 Code: 8480-6 BMI: 36.9 

Code: 96094-8 Heart 

Rate 1: 60 bpm      Height: 5'4"        Temperature: 36.1 (C) / 97.0 (F) Weight:

 215 lbs 

 

                05/10/2013      Blood Pressure 1: 132/86 Code: 8480-6 BMI: 36.9 

Code: 15458-1 Heart 

Rate 1: 76 bpm      Height: 5'4"        Respiratory Rate: 20 bpm Temperature: 36

.8 (C) / 

98.2 (F)                                Weight: 215 lbs 

 

                2013      Blood Pressure 1: 134/82 Code: 8480-6 BMI: 36.6 

Code: 97272-9 Heart 

Rate 1: 72 bpm      Height: 5'4"        Respiratory Rate: 20 bpm Temperature: 36

.3 (C) / 

97.4 (F)                                Weight: 213 lbs 

 

                2012      Blood Pressure 1: 142/80 Code: 8480-6 BMI: 37.1 

Code: 27267-7 Heart 

Rate 1: 76 bpm      Height: 5'4"        Respiratory Rate: 20 bpm Temperature: 36

.8 (C) / 

98.3 (F)                                Weight: 216 lbs 

 

                10/23/2012      Blood Pressure 1: 128/68 Code: 8480-6 BMI: 37.6 

Code: 36901-6 Heart 

Rate 1: 72 bpm      Height: 5'4"        Respiratory Rate: 20 bpm Temperature: 36

.6 (C) / 

97.9 (F)                                Weight: 219 lbs 

 

                10/10/2012      Blood Pressure 1: 122/70 Code: 8480-6 Heart Rate

 1: 76 bpm 

Respiratory Rate: 20 bpm  Temperature: 36.7 (C) / 98.1 (F) Weight: 218 lbs 

 

                    2012          Blood Pressure 1: 132/80 Code: 8480-6 He

art Rate 1: 84 bpm

 

                2012      Blood Pressure 1: 128/78 Code: 8480-6 BMI: 38.1 

Code: 71778-2 Heart 

Rate 1: 84 bpm      Height: 5'4"        Respiratory Rate: 20 bpm Temperature: 36

.9 (C) / 

98.4 (F)                                Weight: 222 lbs 

 

                2012      Blood Pressure 1: 138/80 Code: 8480-6 BMI: 37.9 

Code: 57036-0 Heart 

Rate 1: 74 bpm      Height: 5'4"        Temperature: 36.1 (C) / 97.0 (F) Weight:

 221 lbs 

 

                2012      Blood Pressure 1: 126/78 Code: 8480-6 BMI: 38.4 

Code: 67880-3 Heart 

Rate 1: 72 bpm      Height: 5'4"        Respiratory Rate: 20 bpm Temperature: 36

.7 (C) / 

98.0 (F)                                Weight: 224 lbs 

 

                2012      Blood Pressure 1: 138/72 Code: 8480-6 BMI: 38.4 

Code: 64986-6 Heart 

Rate 1: 72 bpm      Height: 5'4"        Respiratory Rate: 20 bpm Temperature: 36

.6 (C) / 

97.9 (F)                                Weight: 224 lbs 

 

                2012      Blood Pressure 1: 122/78 Code: 8480-6 BMI: 38.8 

Code: 06301-6 Heart 

Rate 1: 88 bpm      Height: 5'4"        Respiratory Rate: 20 bpm Temperature: 36

.6 (C) / 

97.8 (F)                                Weight: 226 lbs 

 

                2012      Blood Pressure 1: 116/60 Code: 8480-6 BMI: 38.1 

Code: 74643-5 Heart 

Rate 1: 92 bpm      Height: 5'4"        Respiratory Rate: 20 bpm Temperature: 36

.8 (C) / 

98.2 (F)                                Weight: 222 lbs 

 

                2011      Blood Pressure 1: 118/62 Code: 8480-6 BMI: 37.9 

Code: 63951-3 Heart 

Rate 1: 80 bpm      Height: 5'4"        Temperature: 36.5 (C) / 97.7 (F) Weight:

 221 lbs 

 

                2011      Blood Pressure 1: 132/70 Code: 8480-6 Heart Rate

 1: 84 bpm 

Respiratory Rate: 20 bpm  Temperature: 36.7 (C) / 98.0 (F) Weight: 221 lbs 

 

                2011      Blood Pressure 1: 114/72 Code: 8480-6 BMI: 37.6 

Code: 46693-0 Heart 

Rate 1: 76 bpm      Height: 5'4"        Respiratory Rate: 20 bpm Temperature: 36

.8 (C) / 

98.3 (F)                                Weight: 219 lbs 

 

                    2011          Blood Pressure 1: 136/76 Code: 8480-6 Te

mperature: 36.0 (C) / 96.8 

(F)                                     Weight: 219 lbs 8 oz

 

                2011      Blood Pressure 1: 128/80 Code: 8480-6 Heart Rate

 1: 72 bpm 

Temperature: 36.3 (C) / 97.4 (F)        Weight: 218 lbs 

 

                2011      Blood Pressure 1: 126/70 Code: 8480-6 Heart Rate

 1: 72 bpm 

Temperature: 36.7 (C) / 98.0 (F)

 

                    2010          Blood Pressure 1: 126/78 Code: 8480-6 Te

mperature: 36.2 (C) / 97.1 

(F)                                     Weight: 217 lbs 8 oz

 

                2010      Blood Pressure 1: 116/70 Code: 8480-6 Heart Rate

 1: 72 bpm 

Temperature: 36.4 (C) / 97.6 (F)        Weight: 222 lbs 

 

                2010      Blood Pressure 1: 114/78 Code: 8480-6 Heart Rate

 1: 72 bpm 

Temperature: 37.1 (C) / 98.8 (F)        Weight: 225 lbs 

 

                2010      Blood Pressure 1: 128/78 Code: 8480-6 Heart Rate

 1: 76 bpm 

Temperature: 36.6 (C) / 97.8 (F)        Weight: 224 lbs 

 

                2010      Blood Pressure 1: 116/78 Code: 8480-6 Heart Rate

 1: 80 bpm 

Temperature: 36.4 (C) / 97.6 (F)        Weight: 226 lbs 

 

                2010      Blood Pressure 1: 120/70 Code: 8480-6 BMI: 38.3 

Code: 08313-3 Heart 

Rate 1: 88 bpm      Height: 5'5"        Temperature: 36.4 (C) / 97.6 (F) Weight:

 230 lbs 







Functional Status

No Functional Status data



Reason For Visit





                    Reason For Visit    Effective Dates     Notes

 

                    follow up           2020           

 

                    follow up           2020           

 

                    blood pressure check 2020           

 

                    high blood pressure 2020           

 

                    lab draw            2019           

 

                    lab draw            10/11/2019           

 

                    hearing loss        2019          left ear

 

                    follow up           06/10/2019           

 

                    follow up           2019          Patient has upcoming

 appointment with Dr Claire and carotid 

dopplers tomorrow

 

                    follow up           2018          Patient had heart ca

th on 10-30-18 and one of the bypass 

veins in spasming

 

                    follow up           2018          4 Week

 

                    follow up           10/02/2018           

 

                    follow up           2018           

 

                    high blood pressure 2018          Dr Claire has ordered

 holter monitor and 

hydralazine 50mg PRN

 

                    dizziness           2018          On  morning pa

sammint woke up and went to the bathroom 

and after lying down became very dizzy. Patient has hx of vertigo so didn't 
think anything of it. She usually just has them intermittently, but had more 
that day. While at [facility] began to feel very ill and became very shaky. She got 
home and sat in the recliner and had the jittery/nervous feeling. Later that 
night it finally resolved. Patient feels like she had a spell like this around 
the  and thought it was due to extreme heat exhaustion.

 

                    follow up           2018           

 

                    back pain           2018           

 

                    otalgia             2018           

 

                    back pain           2018           

 

                    injection(s)        10/06/2017          flu shot

 

                    lab draw            2017           

 

                    follow up           2017           

 

                    pelvic pain         2017          Patient states pain 

started in May with a "Constant Ache"

to left inguinal area. Patient states at that time pain was intermittent. Then, 
approximately 2nd week  of  pain worsened and radiates to left low back 
area.

 

                    lab draw            2017           

 

                    hyperglycemia       2017           

 

                    cough               2017           

 

                    otalgia             2016           

 

                    injection(s)        10/07/2016          Influenza

 

                    lab draw            2016           

 

                    constipation        2016           

 

                    flank pain          2016           

 

                    follow up           2015          3mo fwup

 

                    injection(s)        10/16/2015          Influenza

 

                    acute renal failure 09/15/2015           

 

                    lab draw            09/10/2015           

 

                    fatigue             2015           

 

                    otalgia             2015           

 

                    pain, limb          2015           

 

                    follow up           2015          Hospital fwup

 

                    high blood pressure 2015           

 

                    injection(s)        10/17/2014          flu shot

 

                    sinusitis           2014           

 

                    high blood pressure 2014           

 

                    cough               2014          Was panfilo at Dr Tyree teixeira's office so he started he on amlodipine 

10mg but seems to be dragging her down. Patient decreased to 1/2 tablet this 
last week

 

                    lab draw            2014           

 

                    cough               2014           

 

                    cough               10/15/2013           

 

                    high blood pressure 2013           

 

                    follow up           2013          ER

 

                    dizziness           2013           

 

                    follow up           2013           

 

                    otalgia             05/10/2013           

 

                    breast complaint    2013           

 

                    lab draw            2012           

 

                    follow up           2012          2mo fwup

 

                    follow up           10/23/2012          2wk fwup

 

                    gas and bloating    10/10/2012          Dr Claire has her mon

itoring because was high in his 

office at last visit

 

                    injection(s)        2012           

 

                    dizziness           2012           

 

                    dizziness           2012           

 

                    lab draw            2012           

 

                    muscle weakness     2012          concerns of simvasta

tin with fatigue and muscle 

weakness

 

                    leg pain/sciatica   2012           

 

                    hip pain            2012           

 

                    back pain           2012          has tried ice pack, 

muscle relaxers, and moist heat

 

                    back pain           2012           

 

                    fatigue             2012          in hands/feet

 

                    otalgia             2011           

 

                    follow up           2011          2wk fwup

 

                    back pain           2011          thinks ulcer may hav

e returned

 

                    lab draw            2011           

 

                    dizziness           2011          Left ear and facial 

pain, right ear pain 

 

                    follow up           2011          1wk fwup

 

                    hip pain            2011          feels very draggy, f

atigue, BP 80/63, normally running 

100's/60's

 

                    chills              2010           

 

                    injection(s)        10/06/2010          flu shot

 

                    follow up           2010          6wk fwup, had HH rep

aired and is starting to feel better, 

started on reglan 10mg tid

 

                    follow up           2010          hosp fwup

 

                    follow up           2010          1mo fwup, saw Dr Danna du and hasn't gave cardiac clearance 

yet for HH repair, did have chemical stress test yesterday and waiting on 
results

 

                    follow up           2010          from EGD/colonoscopy

--has Hiatal Hernia and wants to do 

repair

 

                    follow up           2010           







Encounters





             Encounter    Performer    Location     Codes        Date

 

                                        ([id number]) OFFICE/OUTPATIENT VISIT EST

Diagnosis: [facility] (primary) hypertension[ICD10: I10]

Diagnosis: Generalized anxiety disorder[ICD10: F41.1] Akanksha DRIVER [facility] CPT-4: 55027              2020

 

                                        ([id number]) OFFICE/OUTPATIENT VISIT EST

Diagnosis: Essential (primary) hypertension[ICD10: I10]

Diagnosis: Palpitations[ICD10: R00.2] Akanksha GUNN 

[facility]                    CPT-4: 87615              2020

 

                                        (59297) OFFICE/OUTPATIENT VISIT EST

Diagnosis: Essential hypertension[ICD10: I10]

Diagnosis: Palpitations[ICD10: R00.2]

Diagnosis: Stress reaction[ICD10: F43.0] [facility]            CPT-4: 08263              2020

 

                                        (52092) NURSE/OUTPATIENT VISIT EST

Diagnosis: Mixed hyperlipidemia[ICD10: E78.2] Akanksha DRIVER DO [facility]            CPT-4: 77959              2019

 

                                        (74606) NURSE/OUTPATIENT VISIT EST

Diagnosis: FLU VACCINE[ICD10: Z23] Akanksha KEY DO 

[facility]                       CPT-4: 18971              10/22/2019

 

                                        (15476) NURSE/OUTPATIENT VISIT EST

Diagnosis: Chronic kidney disease, stage 1[ICD10: N18.1] Akanksha DRIVER DO [facility] CPT-4: 49184              10/11/2019

 

                                        (72484) OFFICE/OUTPATIENT VISIT EST

Diagnosis: Acute serous otitis media, left ear[ICD10: H65.02] Nat DRIVER DO [facility] CPT-4: 81948              2019

 

                                        (61721) OFFICE/OUTPATIENT VISIT EST

Diagnosis: Essential (primary) hypertension[ICD10: I10]

Diagnosis: Coronary atherosclerosis due to calcified coronary lesion[ICD10: 
I25.84]

Diagnosis: Hypoglycemia, unspecified[ICD10: E16.2]

Diagnosis: Other fatigue[ICD10: R53.83]

Diagnosis: Urinary tract infection, site not specified[ICD10: N39.0] Akanksha DRIVER DO [facility] CPT-4: 95839        06/10/2019

 

                                        (29210) OFFICE/OUTPATIENT VISIT EST

Diagnosis: Essential (primary) hypertension[ICD10: I10]

Diagnosis: Gastro-esophageal reflux disease without esophagitis[ICD10: K21.9]

Diagnosis: Urinary tract infection, site not specified[ICD10: N39.0] Akanksha DRIVER DO [facility] CPT-4: 53464        2019

 

                                        (24595) OFFICE/OUTPATIENT VISIT EST

Diagnosis: Gastro-esophageal reflux disease without esophagitis[ICD10: K21.9]

Diagnosis: Epigastric pain[ICD10: R10.13] Akanksha DRIVER [employer]            CPT-4: 38030              2018

 

                                        (05949) OFFICE/OUTPATIENT VISIT EST

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

Diagnosis: Essential (primary) hypertension[ICD10: I10]

Diagnosis: Generalized anxiety disorder[ICD10: F41.1]

Diagnosis: Gastro-esophageal reflux disease without esophagitis[ICD10: K21.9] 

Akanksha DRIVER [employer] [facility] CPT-4: 54120        2018

 

                                        OFFICE/OUTPATIENT VISIT EST

Diagnosis: Gastro-esophageal reflux disease without esophagitis[ICD10: K21.9]

Diagnosis: Palpitations[ICD10: R00.2]

Diagnosis: Urinary tract infection, site not specified[ICD10: N39.0]

Diagnosis: Generalized anxiety disorder[ICD10: F41.1] Akanksha SPENCER [employer] [facility] CPT-4: 75158              10/02/2018

 

                                        (57823) NURSE/OUTPATIENT VISIT EST

Diagnosis: Coronary atherosclerosis due to calcified coronary lesion[ICD10: 
I25.84]

Diagnosis: Hypoglycemia, unspecified[ICD10: E16.2]

Diagnosis: Dizziness and giddiness[ICD10: R42]

Diagnosis: Occlusion and stenosis of bilateral carotid arteries[ICD10: I65.23] 

Akanksha DRIVER [employer] [facility] CPT-4: 02552        2018

 

                                        OFFICE/OUTPATIENT VISIT EST

Diagnosis: Epigastric pain[ICD10: R10.13]

Diagnosis: Generalized anxiety disorder[ICD10: F41.1]

Diagnosis: FLU VACCINE[ICD10: Z23] Akanksha SPENCER

 [employer] 

[facility]                       CPT-4: 71197              2018

 

                                        (41113) OFFICE/OUTPATIENT VISIT EST

Diagnosis: Essential (primary) hypertension[ICD10: I10]

Diagnosis: Hypoglycemia, unspecified[ICD10: E16.2]

Diagnosis: Gastro-esophageal reflux disease without esophagitis[ICD10: K21.9] 

Akanksha DRIVER [facility] CPT-4: 48716        2018

 

                                        (47594) OFFICE/OUTPATIENT VISIT EST

Diagnosis: Dizziness and giddiness[ICD10: R42] Nat DRIVER [facility]            CPT-4: 89882              2018

 

                                        (12870) OFFICE/OUTPATIENT VISIT EST

Diagnosis: Cervicalgia[ICD10: M54.2]

Diagnosis: Other spondylosis with radiculopathy, cervical region[ICD10: M47.22] 

Akanksha DRIVER [facility] CPT-4: 61963        2018

 

                                        (41634) OFFICE/OUTPATIENT VISIT EST

Diagnosis: Hypoglycemia, unspecified[ICD10: E16.2]

Diagnosis: Other spondylosis with radiculopathy, cervical region[ICD10: M47.22]

Diagnosis: Vertigo of central origin, bilateral[ICD10: H81.43]

Diagnosis: Cervicocranial syndrome[ICD10: M53.0] [facility]         CPT-4: 33599              2018

 

                                        (59331) OFFICE/OUTPATIENT VISIT EST

Diagnosis: Benign paroxysmal vertigo, bilateral[ICD10: H81.13]

Diagnosis: Otalgia, bilateral[ICD10: H92.03] Nat DRIVER [facility]            CPT-4: 92349              2018

 

                                        (18377) OFFICE/OUTPATIENT VISIT EST

Diagnosis: Low back pain[ICD10: M54.5]

Diagnosis: Radiculopathy, lumbosacral region[ICD10: M54.17]

Diagnosis: Left lower quadrant pain[ICD10: R10.32] Akanksha Xiangndangela IZQUIERDOTAMMY

OLIVIA DRIVER [employer] [facility]         CPT-4: 69320              2018

 

                                        (42706) OFFICE/OUTPATIENT VISIT EST

Diagnosis: FLU VACCINE[ICD10: Z23] Akanksha Otoolendangela SPENCER

[facility]                       CPT-4: 69999              10/06/2017

 

                                        (60591) OFFICE/OUTPATIENT VISIT EST

Diagnosis: Mixed hyperlipidemia[ICD10: E78.2]

Diagnosis: Essential (primary) hypertension[ICD10: I10]

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

Diagnosis: Impaired fasting glucose[ICD10: R73.01]

Diagnosis: Other fatigue[ICD10: R53.83] Akanksha DRIVER

[facility]                    CPT-4: 88264              2017

 

                                        (11495) OFFICE/OUTPATIENT VISIT EST

Diagnosis: Pain in thoracic spine[ICD10: M54.6]

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 

Akanksha DRIVER [facility] CPT-4: 58514        2017

 

                                        (62401) OFFICE/OUTPATIENT VISIT EST

Diagnosis: Left lower quadrant pain[ICD10: R10.32]

Diagnosis: Low back pain[ICD10: M54.5] Akanksha SYKES 

[facility]                    CPT-4: 29380              2017

 

                                        (10443) OFFICE/OUTPATIENT VISIT EST

Diagnosis: Mixed hyperlipidemia[ICD10: E78.2]

Diagnosis: Essential (primary) hypertension[ICD10: I10]

Diagnosis: Hypoglycemia, unspecified[ICD10: E16.2] Akanksha DRIVER [facility]         CPT-4: 80407              2017

 

                                        (99818) OFFICE/OUTPATIENT VISIT EST

Diagnosis: Impaired fasting glucose[ICD10: R73.01]

Diagnosis: Mastodynia[ICD10: N64.4]

Diagnosis: Mixed hyperlipidemia[ICD10: E78.2]

Diagnosis: Essential (primary) hypertension[ICD10: I10]

Diagnosis: Other fatigue[ICD10: R53.83] Akanksha DRIVER

[facility]                    CPT-4: 24185              2017

 

                                        (01727) OFFICE/OUTPATIENT VISIT EST

Diagnosis: Acute upper respiratory infection, unspecified[ICD10: J06.9] Luba Stevenson              AKANKSHA DRIVER [facility] CPT-4: 53736        2017

 

                                        (57996) OFFICE/OUTPATIENT VISIT EST

Diagnosis: Acute mastoiditis without complications, right ear[ICD10: H70.001] 

Akanksha Villa DRIVER [facility] CPT-4: 06483        2016

 

                                        (30988) OFFICE/OUTPATIENT VISIT EST

Diagnosis: FLU VACCINE[ICD10: Z23] Akanksha KEY [facility]                       CPT-4: 17898              10/07/2016

 

                                        (11003) OFFICE/OUTPATIENT VISIT EST

Diagnosis: Mixed hyperlipidemia[ICD10: E78.2]

Diagnosis: Essential (primary) hypertension[ICD10: I10]

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

Diagnosis: Impaired fasting glucose[ICD10: R73.01] Akanksha IZQUIERDOTAMMY

[facility] DRIVER DO [facility]         CPT-4: 18108              2016

 

                                        ([id number]) OFFICE/OUTPATIENT VISIT EST

Diagnosis: Constipation, unspecified[ICD10: K59.00] Akanksha DRIVER DO [facility] CPT-4: 49743              2016

 

                                        (95575) OFFICE/OUTPATIENT VISIT EST

Diagnosis: Essential (primary) hypertension[ICD10: I10]

Diagnosis: Mixed hyperlipidemia[ICD10: E78.2]

Diagnosis: Chronic kidney disease, stage 1[ICD10: N18.1] Akanksha DRIVER [facility] CPT-4: 63043              2016

 

                                        (23909) OFFICE/OUTPATIENT VISIT EST

Diagnosis: Muscle weakness (generalized)[ICD10: M62.81]

Diagnosis: Dizziness and giddiness[ICD10: R42]

Diagnosis: Essential (primary) hypertension[ICD10: I10]

Diagnosis: History of falling[ICD10: Z91.81] Akankshatammy KEVINRAMOS DRIVER [facility]            CPT-4: 86241              2015

 

                                        (52811) OFFICE/OUTPATIENT VISIT EST

Diagnosis: FLU VACCINE[ICD10: Z23] Akanksha Otoolerodo        AKANKSHA OLIVIA KEY [facility]                       CPT-4: 09793              10/16/2015

 

                                        (32706) OFFICE/OUTPATIENT VISIT EST

Diagnosis: Acute renal failure[ICD9: 584.9]

Diagnosis: POLYURIA[ICD9: 788.42] Akankshatammy KEVINQUELINE SAramis GUERRERO LANCE [facility]                       CPT-4: 15221              09/15/2015

 

                                        (60710) OFFICE/OUTPATIENT VISIT EST

Diagnosis: HYPERLIPIDEMIA NEC/NOS[ICD9: 272.4]

Diagnosis: HYPERTENSION[ICD9: 401.9]

Diagnosis: CAD[ICD9: 414.00]

Diagnosis: Hyperglycemia[ICD9: 790.29]

Diagnosis: MALAISE AND FATIGUE[ICD9: 780.79] Akanksha DRIVER [employer] [facility]            CPT-4: 92202              09/10/2015

 

                                        (22864) OFFICE/OUTPATIENT VISIT EST

Diagnosis: MALAISE AND FATIGUE[ICD9: 780.79]

Diagnosis: HYPOGLYCEMIA[ICD9: 251.2]

Diagnosis: Grieving[ICD9: 309.0]

Diagnosis: ABDOMINAL PAIN[ICD9: 789.00] Akanksha DRIVER

[employer] [facility]                    CPT-4: 49966              2015

 

                                        OFFICE/OUTPATIENT VISIT EST

Diagnosis: CERUMEN IMPACTION[ICD9: 380.4]

Diagnosis: EUSTACHIAN TUBE DYSFUNCTION[ICD9: 381.81] Bertha Elieser      

AKANKSHA DRIVER [employer] [facility] CPT-4: 03295              2015

 

                                        (70778) OFFICE/OUTPATIENT VISIT EST

Diagnosis: Leg pain[ICD9: 729.5]

Diagnosis: SUPERFIC PHLEBITIS-LEG[ICD9: 451.0] Akanksha DRIVER [employer] [facility]            CPT-4: 21865              2015

 

                                        ([id number]) OFFICE/OUTPATIENT VISIT EST

Diagnosis: Thoracic back pain[ICD9: 724.1]

Diagnosis: SPASM OF MUSCLE[ICD9: 728.85] Akanksha DRIVER [employer] [facility]            CPT-4: 14669              2015

 

                                        (02209) OFFICE/OUTPATIENT VISIT EST

Diagnosis: DIZZINESS/VERTIGO[ICD9: 780.4]

Diagnosis: Benign positional vertigo[ICD9: 386.11]

Diagnosis: HYPERTENSION[ICD9: 401.9]

Diagnosis: Suspicious nevus[ICD9: 238.2] Akanksha DRIVER [employer] [facility]            CPT-4: 81132              2015

 

                                        (71441) OFFICE/OUTPATIENT VISIT EST

Diagnosis: FLU VACCINE[ICD10: Z23] Akanksha SPENCER

[facility]                       CPT-4: 89594              10/17/2014

 

                                        OFFICE/OUTPATIENT VISIT EST

Diagnosis: SINUSITIS, ACUTE[ICD9: 461.9]

Diagnosis: EUSTACHIAN TUBE DYSFUNCTION[ICD9: 381.81] [facility] CPT-4: 25562              2014

 

                                        (80289) OFFICE/OUTPATIENT VISIT EST

Diagnosis: DIZZINESS/VERTIGO[ICD9: 780.4]

Diagnosis: HYPERTENSION[ICD9: 401.9] Akanksha OTOOLE

[facility]                       CPT-4: 67650              2014

 

                                        (78109) OFFICE/OUTPATIENT VISIT EST

Diagnosis: HYPERTENSION[ICD9: 401.9]

Diagnosis: MALAISE AND FATIGUE[ICD9: 780.79]

Diagnosis: SINUSITIS, ACUTE[ICD9: 461.9] [facility]            CPT-4: 05878              2014

 

                                        (76155) OFFICE/OUTPATIENT VISIT EST

Diagnosis: HYPERLIPIDEMIA NEC/NOS[ICD9: 272.4]

Diagnosis: HYPERTENSION[ICD9: 401.9]

Diagnosis: B12 DEFIC ANEMIA NEC[ICD9: 281.1]

Diagnosis: HYPOGLYCEMIA[ICD9: 251.2] [facility]                       CPT[facility]: 12737              2014

 

                                        OFFICE/OUTPATIENT VISIT EST

Diagnosis: COUGH[ICD9: 786.2] Bertha [facility] 

CPT-4: 17143                            2014

 

                                        OFFICE/OUTPATIENT VISIT EST

Diagnosis: COUGH[ICD9: 786.2]

Diagnosis: URI, ACUTE[ICD9: 465.9] Bertha SPENCER

[facility]                       CPT-4: 78726              10/15/2013

 

                                        (69190) OFFICE/OUTPATIENT VISIT EST

Diagnosis: HYPERTENSION[ICD9: 401.9]

Diagnosis: CEPHALGIA[ICD9: 784.0]

Diagnosis: ANXIETY STATE NOS[ICD9: 300.00]

Diagnosis: FLU VACCINE[ICD9: V04.81] Akanksha ROTHMANR [facility]                       CPT-4: 77904              2013

 

                                        (83349) OFFICE/OUTPATIENT VISIT EST

Diagnosis: DIZZINESS/VERTIGO[ICD9: 780.4]

Diagnosis: SINUSITIS, ACUTE[ICD9: 461.9]

Diagnosis: Benign positional vertigo[ICD9: 386.11] Akanksha DRIVER [facility]         CPT-4: 45492              2013

 

                                        OFFICE/OUTPATIENT VISIT EST

Diagnosis: OTITIS MEDIA NOS[ICD9: 382.9] Akanksha DRIVER [facility]            CPT-4: 41723              2013

 

                                        OFFICE/OUTPATIENT VISIT EST

Diagnosis: Perforation of ear drum[ICD9: 384.20]

Diagnosis: SINUSITIS, ACUTE[ICD9: 461.9] Akanksha DRIVER [facility]            CPT-4: 46135              05/10/2013

 

                                        (30713) OFFICE/OUTPATIENT VISIT EST

Diagnosis: Mastalgia[ICD9: 611.71] Akanksha SPENCER

[facility]                       CPT-4: 93401              2013

 

                                        (90353) OFFICE/OUTPATIENT VISIT EST

Diagnosis: HYPERLIPIDEMIA NEC/NOS[ICD9: 272.4]

Diagnosis: HYPERTENSION[ICD9: 401.9]

Diagnosis: DIZZINESS/VERTIGO[ICD9: 780.4]

Diagnosis: Hyperglycemia[ICD9: 790.29]

Diagnosis: MALAISE AND FATIGUE[ICD9: 780.79] [facility]            CPT-4: 73216              2012

 

                                        (27748) OFFICE/OUTPATIENT VISIT EST

Diagnosis: EUSTACHIAN TUBE DYSFUNCTION[ICD9: 381.81]

Diagnosis: DIZZINESS/VERTIGO[ICD9: 780.4]

Diagnosis: ABDOMINAL PAIN[ICD9: 789.00]

Diagnosis: GERD[ICD9: 530.81]

Diagnosis: CERUMEN IMPACTION[ICD9: 380.4] [facility]ha [facility]            CPT-4: 67443              2012

 

                                        OFFICE/OUTPATIENT VISIT EST

Diagnosis: ABDOMINAL PAIN[ICD9: 789.00]

Diagnosis: DYSPEPSIA[ICD9: 536.8] Akanksha LANCE [facility]                       CPT-4: 94713              10/23/2012

 

                                        (23168) OFFICE/OUTPATIENT VISIT EST

Diagnosis: ABDOMINAL PAIN[ICD9: 789.00]

Diagnosis: DYSPEPSIA[ICD9: 536.8]

Diagnosis: IBS[ICD9: 564.1] Akanksha DRIVER [facility] CPT

-

4: 86480                                10/10/2012

 

                                        OFFICE/OUTPATIENT VISIT EST

Diagnosis: DIZZINESS/VERTIGO[ICD9: 780.4]

Diagnosis: HYPOGLYCEMIA[ICD9: 251.2] Akanksha MEJIA [facility]                       CPT-4: 10632              2012

 

                                        (37645) OFFICE/OUTPATIENT VISIT EST

Diagnosis: URINARY TRACT INFECTION[ICD9: 599.0] Akanksha DRIVER [facility]            CPT-4: 13588              2012

 

                                        (92580) OFFICE/OUTPATIENT VISIT EST

Diagnosis: HYPERLIPIDEMIA NEC/NOS[ICD9: 272.4]

Diagnosis: HYPERTENSION[ICD9: 401.9]

Diagnosis: MALAISE AND FATIGUE[ICD9: 780.79]

Diagnosis: DIZZINESS/VERTIGO[ICD9: 780.4] Akanksha [facility]            CPT-4: 75735              2012

 

                                        (84495) OFFICE/OUTPATIENT VISIT EST

Diagnosis: DIZZINESS/VERTIGO[ICD9: 780.4]

Diagnosis: MALAISE AND FATIGUE[ICD9: 780.79]

Diagnosis: HYPERTENSION[ICD9: 401.9] Akanksha MEJIA [facility]                       CPT-4: 48161              2012

 

                                        OFFICE/OUTPATIENT VISIT EST

Diagnosis: LUMB/LUMBOSAC DISC DEGEN[ICD9: 722.52]

Diagnosis: Radiculopathy of leg[ICD9: 724.4]

Diagnosis: SACROILIITIS NEC[ICD9: 720.2]

Diagnosis: SPINAL ENTHESOPATHY[ICD9: 720.1] Akanksha DRIVER [facility]            CPT-4: 87427              2012

 

                                        (69257) OFFICE/OUTPATIENT VISIT EST

Diagnosis: PAIN, LOWER BACK[ICD9: 724.2]

Diagnosis: SPASM OF MUSCLE[ICD9: 728.85]

Diagnosis: SCIATICA[ICD9: 724.3]

Diagnosis: Lumbar degenerative disc disease[ICD9: 722.52] Akanksha DRIVER [facility] CPT-4: 87150              2012

 

                                        (59342) OFFICE/OUTPATIENT VISIT EST

Diagnosis: PAIN IN THORACIC SPINE[ICD9: 724.1]

Diagnosis: SPASM OF MUSCLE[ICD9: 728.85] Akanksha DRIVER [facility]            CPT-4: 67301              2012

 

                                        (34837) OFFICE/OUTPATIENT VISIT EST

Diagnosis: PAIN, LOWER BACK[ICD9: 724.2]

Diagnosis: SPASM OF MUSCLE[ICD9: 728.85]

Diagnosis: Sacroiliac dysfunction[ICD9: 739.4]

Diagnosis: Lumbar degenerative disc disease[ICD9: 722.52] Akanksha DRIVER [facility] CPT-4: 12049              2012

 

                                        OFFICE/OUTPATIENT VISIT EST

Diagnosis: MALAISE AND FATIGUE[ICD9: 780.79]

Diagnosis: HYPOTENSION[ICD9: 458.9]

Diagnosis: CAD[ICD9: 414.00] Akanksha DRIVER [facility] 

CPT-4: 10299                            2012

 

                                        OFFICE/OUTPATIENT VISIT EST

Diagnosis: SINUSITIS, ACUTE[ICD9: 461.9]

Diagnosis: PHARYNGITIS, ACUTE[ICD9: 462]

Diagnosis: CERUMEN IMPACTION[ICD9: 380.4] [facility]ha [facility]            CPT-4: 77847              2011

 

                                        OFFICE/OUTPATIENT VISIT EST

Diagnosis: PAIN IN THORACIC SPINE[ICD9: 724.1]

Diagnosis: GERD[ICD9: 530.81]

Diagnosis: DIZZINESS/VERTIGO[ICD9: 780.4] Akanksha [facility]            CPT-4: 19299              2011

 

                OFFICE/OUTPATIENT VISIT EST Akanksha MEJIA [facility] CPT-

4: 41175                                2011

 

                    (32375) OFFICE/OUTPATIENT VISIT EST Akanksha CASTILLO S. [facility] 

[facility]                       CPT-4: 26214              2011

 

                                        (69046) OFFICE/OUTPATIENT VISIT, EST

Diagnosis: PAIN, LOWER BACK[ICD9: 724.2] Akanksha BAUMAN S.

 

[facility]            CPT-4: 62245              2011

 

                    (77095) OFFICE/OUTPATIENT VISIT, EST Akanksha DE LA ROSA S. [facility]

[facility]                       CPT-4: 41564              2011

 

                    (87683) OFFICE/OUTPATIENT VISIT, EST Akanksha DE LA [facility]A [facility]

[facility]                       CPT-4: 42179              2010

 

                    (50479) OFFICE/OUTPATIENT VISIT, EST Akanksha DE LA [facility]A [facility]

[facility]                       CPT-4: 32757              2010

 

                    (31363) OFFICE/OUTPATIENT VISIT, EST Akanksha DE LA [facility]A [facility]

[facility]                       CPT-4: 58851              2010

 

                    (74836) OFFICE/OUTPATIENT VISIT, EST Akanksha DE LA ROSA S[facility]

[facility]                       CPT-4: 68424              2010

 

                    (35983) OFFICE/OUTPATIENT VISIT, EST Akanksha DE LA ROSA S. [facility]

[facility]                       CPT-4: 78368              2010

 

                    (62452) OFFICE/OUTPATIENT VISIT, EST Akanksha DE LA ROSA S. [facility]

[facility]                       CPT-4: 73293              2010







Plan of Care





             Planned Activity Notes        Codes        Status       Date

 

                          Visit Diagnosis Plan: Generalized anxiety disorder Dis

cussion: Stable

                                        ICD-9 : 300.00

ICD-10 : F41.1

                                                    2020

 

                          Visit Diagnosis Plan: Essential (primary) hypertension

 Discussion: Stable

Follow Up: 1 months

                                        ICD-9 : 401.9

ICD-10 : I10

                                                    2020

 

                          Patient Education: metoprolol tartrate- [employer] [facility] [id number] 

[email or web address]/[email or web address]

                                        Completed           2020

 

                          Visit Diagnosis Plan: Essential (primary) hypertension

 Discussion: Improving 

with higher dose of metoprolol Recheck 2weeks

                                        ICD-9 : 401.9

ICD-10 : I10

                                                    2020

 

                          Visit Diagnosis Plan: Palpitations Discussion: Continu

e higher dose of 

metoprolol

                                        ICD-9 : 785.1

ICD-10 : R00.2

                                                    2020

 

                                        Appointment: Akanksha Drivertel:[phone]

                                        [address] [place] [address]                                              FOLLOW UP       2020

 

                                        Appointment: Akanksha Drivertel:[phone]

                                        [address] [place] [address]              2020--moved up to Tues 20 at 4pm (km)                 [place]

NCELED        2020

 

                          Visit Diagnosis Plan: Palpitations Discussion: Check C

BC, CMP, TSH

                                        ICD-9 : 785.1

ICD-10 : R00.2

                                                    2020

 

                          Visit Diagnosis Plan: Essential hypertension Discussio

n: Increase metoprolol to 

25mg q AM and 50mg po q pM Recheck 1 week

                                        ICD-9 : 401.9

ICD-10 : I10

                                                    2020

 

                                        Appointment: Akanksha Drivertel:[phone]

                                        [address] [place] [address]                                              BP CHECK        2020

 

                                        Appointment: Akanksha Drivertel:[phone]

                                        [address] [place] [address]                                              ACUTE ILLNESS   2020

 

                          Patient Education: metoprolol tartrate- [employer] [facility] [id number] 

https://www.[employer].com/samplemd/resources/getResource/61/6l727512-7744-9v54-9k

                                        Completed           2020

 

                    Care Plan: X-RAY EXAM NECK SPINE 4/5VWS                     

LOINC : 20704-4

                          Pending                   2020

 

                    Care Plan: X-RAY EXAM THORAC SPINE4/>VW                     

LOINC : 52841-7

                          Pending                   2020

 

                                        Appointment: Akanksha Driverl:[phone]

                                        [address] [place], [place] [address]

[place]                                              LAB             2019

 

                                        Appointment: Akanksha Driver

WPtel:[phone]

                                        [address] [place], [place] [id number]

[place]                                              INJECTION       10/22/2019

 

             Patient Education: INFLUENZA VACCINE CDC                           

Completed    10/22/2019

 

                                        Appointment: Akanksha Driver

WPtel:[phone]

                                        [address] [place], [place] [id number]                                              LAB             10/11/2019

 

                          Visit Diagnosis Plan: Acute serous otitis media, left 

ear Discussion: instructed

to use flonase and claritin daily for symptom relief. discussed with patient 
that if no improvement in 2 weeks, will need to follow up with ENT for audiology
exam. instructed to call office with any other symptoms such as otalgia, 
dysphagia, fever, etc.

                                        ICD-9 : 381.01

ICD-10 : H65.02

                                                    2019

 

                                        Appointment: Nat Loozya

                                        [address] [place] [address]                                              ACUTE ILLNESS   2019

 

                          Visit Diagnosis Plan: Urinary tract infection, site no

t specified Discussion: 

Started an old abx prescription

                                        ICD-9 : 599.0

ICD-10 : N39.0

                                                    06/10/2019

 

                          Visit Diagnosis Plan: Hypoglycemia, unspecified Discus

madeleine: Monitor BS At least 

6 small meals a day each with protein

                                        ICD-9 : 251.2

ICD-10 : E16.2

                                                    06/10/2019

 

                          Visit Diagnosis Plan: Essential (primary) hypertension

 Discussion: Stable Check 

CMP

                                        ICD-9 : 401.9

ICD-10 : I10

                                                    06/10/2019

 

                          Visit Diagnosis Plan: Other fatigue Discussion: Check 

CBC, TSH

                                        ICD-9 : 780.79

ICD-10 : R53.83

                                                    06/10/2019

 

                                        Appointment: Akanksha Driver

WPtel:[phone]

                                        [address] [place] [address]                                              FOLLOW UP       06/10/2019

 

                          Visit Diagnosis Plan: Essential (primary) hypertension

 Discussion: Stable Sees 

Dr. Clements this month

                                        ICD-9 : 401.9

ICD-10 : I10

                                                    2019

 

                          Visit Diagnosis Plan: Urinary tract infection, site no

t specified Discussion: 

Macrobid 100mg po BID for 1 week

                                        ICD-9 : 599.0

ICD-10 : N39.0

                                                    2019

 

                          Visit Diagnosis Plan: Gastro-esophageal reflux disease

 without esophagitis 

Discussion: Stable on omeprazole

Follow Up: 3 months

                                        ICD-9 : 530.81

ICD-10 : K21.9

                                                    2019

 

                                        Appointment: Akanksha Driver

WPtel:[phone]

                                        [address] [place], [place] [address]

[place]                                              FOLLOW UP       2019

 

                          Patient Education: metoprolol tartrate- [employer] [facility] [id number] 

https://www.[employer]/resources/getResource/61/045w8i86-3qen-66mh-77

                                        Completed           2019

 

                                        Appointment: Akanksha Driver

WPtel:[phone]

                                        [address] [place], [place] [address]

[place]                                              CANCELED        02/15/2019

 

                          Visit Diagnosis Plan: Gastro-esophageal reflux disease

 without esophagitis 

Discussion: Continue protonix and carafate Proceed with updated EGD

                                        ICD-9 : 530.81

ICD-10 : K21.9

                                                    2018

 

                          Visit Diagnosis Plan: Epigastric pain Discussion: [facility] CT abdomen/pelvis due to

ongoing abdominal pain

                                        ICD-9 : 789.06

ICD-10 : R10.13

                                                    2018

 

                                        Appointment: Akanksha Driver

WPtel:[phone]

                                        [address] [place], [place] [address]

[place]                                              FOLLOW UP       2018

 

                    Care Plan: CT PELVIS W/O DYE                     LOINC : 361

08-9

                          Pending                   2018

 

                    Care Plan: CT ABDOMEN W/O DYE                     LOINC : 36

103-0

                          Pending                   2018

 

                    Care Plan: Referral Order                     SNOMED-CT : 30

[id number]

                          Pending                   2018

 

                                        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

art disease of native coronary artery 

without angina pectoris                 Discussion: S/P cardiac cath--doing medi

vicki management 

with imdur and metoprolol increased Has fwup with cardiology next week Discussed
cardiac rehab

                                        ICD-9 : 414.00

ICD-10 : I25.10

                                                    2018

 

                          Visit Diagnosis Plan: Generalized anxiety disorder Dis

cussion: Stable

                                        ICD-9 : 300.00

ICD-10 : F41.1

                                                    2018

 

                          Visit Diagnosis Plan: Gastro-esophageal reflux disease

 without esophagitis 

Discussion: Continue protonix at BID dosing and carafate BID

Follow Up: 2 months

                                        ICD-9 : 530.81

ICD-10 : K21.9

                                                    2018

 

                          Visit Diagnosis Plan: Essential (primary) hypertension

 Discussion: Stable

                                        ICD-9 : 401.9

ICD-10 : I10

                                                    2018

 

                                        Appointment: Akanksha Drivertel:[phone]

                                        [address] [place], [place] [id number]

                                              FOLLOW UP       2018

 

                          Visit Diagnosis Plan: Gastro-esophageal reflux disease

 without esophagitis 

Discussion: Continue protonix at BID dosing Continue carafate at q AC and HS 
dosing for 2 more weeks then decrease to BID dosing

Follow Up: 4 weeks

                                        ICD-9 : 530.81

ICD-10 : K21.9

                                                    10/02/2018

 

                          Visit Diagnosis Plan: Urinary tract infection, site no

t specified Discussion: 

Reculture urine

                                        ICD-9 : 599.0

ICD-10 : N39.0

                                                    10/02/2018

 

                          Visit Diagnosis Plan: Generalized anxiety disorder Dis

cussion: Increase xanax to

BID dosing especially with upcoming cardiac testing which is already making 
patient more anxious and worried

                                        ICD-9 : 300.00

ICD-10 : F41.1

                                                    10/02/2018

 

                          Visit Diagnosis Plan: Palpitations Discussion: Cardilo

logy going to schedule 

Lexiscan

                                        ICD-9 : 785.1

ICD-10 : R00.2

                                                    10/02/2018

 

                                        Appointment: Akanksha Driverl:[phone]

                                        [address] [place], [place] [address]

                                              FOLLOW UP       10/02/2018

 

             Patient Education: Patient Medication Summary                      

     Completed    10/02/2018

 

                                        Appointment: Akanksha Drivertel:[phone]

                                        [address] [place], [place] [id number]

[place]                                              LAB             2018

 

             Patient Education: Patient Medication Summary                      

     Completed    2018

 

                          Visit Diagnosis Plan: Epigastric pain Discussion: Cont

inue protonix and carafate

but make into a slurry Flu shot given Discussed EGD if symptoms persist

Follow Up: 2 weeks

                                        ICD-9 : 789.06

ICD-10 : R10.13

                                                    2018

 

                          Visit Diagnosis Plan: Generalized anxiety disorder Dis

cussion: Did discuss 

increased risk of benzodiazepines causing dementia but at this time will stay at
xanax 0.25mg po BID

                                        ICD-9 : 300.00

ICD-10 : F41.1

                                                    2018

 

                                        Appointment: Akanksha Driver

WPtel:[phone]

                                        [address] [place] [address]                                              FOLLOW UP       2018

 

             Patient Education: Patient Medication Summary                      

     Completed    2018

 

                          Visit Diagnosis Plan: Essential (primary) hypertension

 Discussion: Has 

hydralazine to use prn per cardiology Going to do 30 day holter monitor

                                        ICD-9 : 401.9

ICD-10 : I10

                                                    2018

 

                          Visit Diagnosis Plan: Hypoglycemia, unspecified Discus

madeleine: 6 small meals a 

day--each with protein Increase fluid intake

                                        ICD-9 : 251.2

ICD-10 : E16.2

                                                    2018

 

                          Visit Diagnosis Plan: Gastro-esophageal reflux disease

 without esophagitis 

Discussion: Change to protonix 40mg po BID

Follow Up: 1 months

                                        ICD-9 : 530.81

ICD-10 : K21.9

                                                    2018

 

                                        Appointment: Akanksha Driver

WPtel:[phone]

                                        [address] [place] [address]                                              ACUTE ILLNESS   2018

 

             Patient Education: Patient Medication Summary                      

     Completed    2018

 

                          Visit Diagnosis Plan: Dizziness and giddiness Discussi

on: blood work to be 

completed in office including cmp, cbc, troponin to rule out glucose 
intolerance, infection, dehydration. instructed patient that she needs to see 
her cardiologist sooner than oct and patient requested we contact dr. clements's 
office. will have front office make appt. patient has carotid doppler annually.

                                        ICD-9 : 780.4

ICD-10 : R42

                                                    2018

 

                                        Appointment: Nat Lozoya

                                        [address] [place] [address]                                              ACUTE ILLNESS   2018

 

             Patient Education: Patient Medication Summary                      

     Completed    2018

 

                          Visit Diagnosis Plan: Cervicalgia Discussion: Complete

 course of PT since 

symptoms improved Continue stretching exercises Notify if symptoms return or 
worsen

                                        ICD-9 : 723.1

ICD-10 : M54.2

                                                    2018

 

                                        Appointment: Akanksha Driver

WPtel:[phone]

                                        [address] [place] [address]                                              FOLLOW UP       2018

 

             Patient Education: Patient Medication Summary                      

     Completed    2018

 

                          Visit Diagnosis Plan: Hypoglycemia, unspecified Discus

madeleine: Eat something with 

protein every 2 hrs

                                        ICD-9 : 251.2

ICD-10 : E16.2

                                                    2018

 

                          Visit Diagnosis Plan: Other spondylosis with radiculop

athy, cervical region 

Discussion: Check MRI of cervical spine

                                        ICD-9 : 721.0

ICD-10 : M47.22

                                                    2018

 

                          Visit Diagnosis Plan: Vertigo of central origin, bilat

eral Discussion: Discussed

PT for vestibular therapy

                                        ICD-9 : 386.2

ICD-10 : H81.43

                                                    2018

 

                                        Appointment: Akanksha Driver

WPtel:[phone]

                                        [facility] [address]                                                              2018

 

             Patient Education: Patient Medication Summary                      

     Completed    2018

 

                    Care Plan: MRI NECK SPINE W/O DYE                     LOINC 

: 63227-2

                          Pending                   2018

 

                          Visit Diagnosis Plan: Otalgia, bilateral Discussion: k

enalog 40 mg given to 

patient im. instructed patient to monitor blood sugar at home due to risk of 
increased sugar. instructed patient to rtc on wednesday if no improvement and 
may require antibiotic.

                                        ICD-9 : 388.70

ICD-10 : H92.03

                                                    2018

 

                          Visit Diagnosis Plan: Benign paroxysmal vertigo, bilat

eral Discussion: patient 

has meclizine at home but states it makes her too tired. instructed patient to 
cut in half and take at night. if it doesn't cause too much drowsiness, 
instructed to take bid until feeling better. instructed to rtc wed if no 
improvement or worsening symptoms. instructed patient to increase fluid intake 
as well.

                                        ICD-9 : 386.11

ICD-10 : H81.13

                                                    2018

 

                                        Appointment: Nat Lozoya

                                        [address] [place] [address]                                              ACUTE ILLNESS   2018

 

             Patient Education: Patient Medication Summary                      

     Completed    2018

 

                          Visit Diagnosis Plan: Left lower quadrant pain Discuss

ion: Culture urine Notify 

if worsens

                                        ICD-9 : 789.04

ICD-10 : R10.32

                                                    2018

 

                          Visit Diagnosis Plan: Low back pain Discussion: Stretc

hes Topical aspercreme 

Tylenol 1 po TID

                                        ICD-9 : 724.2

ICD-10 : M54.5

                                                    2018

 

                          Visit Diagnosis Plan: Radiculopathy, lumbosacral regio

n Discussion: As above

                                        ICD-9 : 724.4

ICD-10 : M54.17

                                                    2018

 

                                        Appointment: Akanksha Driver

WPtel:[phone]

                                        [address] [place], [place] [id number]

                                              ACUTE ILLNESS   2018

 

             Patient Education: Patient Medication Summary                      

     Completed    2018

 

                                        Appointment: Akanksha Driver

WPtel:[phone]

                                        [address] [place], [place] [address]

[place]                                              INJECTION       10/06/2017

 

             Patient Education: Patient Medication Summary                      

     Completed    10/06/2017

 

                                        Appointment: Akanksha Driver

WPtel:[phone]

                                        [address] [place], [place] [address]

                                              LAB             2017

 

             Patient Education: Patient Medication Summary                      

     Completed    2017

 

                          Visit Diagnosis Plan: Pain in thoracic spine Discussio

n: PT has helped Continue 

stretches and walking [facility]

                                        ICD-9 : 724.1

ICD-10 : M54.6

                                                    2017

 

                          Visit Diagnosis Plan: Other intervertebral disc degene

ration, lumbar region 

Follow Up: 3 months

                                        ICD-9 : 722.52

ICD-10 : M51.36

                                                    2017

 

                                        Appointment: Akanksha Driver

WPtel:[phone]

                                        [facility]

                                              FOLLOW UP       2017

 

             Patient Education: Patient Medication Summary                      

     Completed    2017

 

                          Visit Diagnosis Plan: Low back pain Discussion: Start 

PT for low back- Seems 

like abdominal pain is radiating from low back

Follow Up: 5 weeks

                                        ICD-9 : 724.2

ICD-10 : M54.5

                                                    2017

 

                          Visit Diagnosis Plan: Left lower quadrant pain Discuss

ion: Had CT scan of 

abdomen/pelvis in 2017 and normal colonoscopy in 2015

                                        ICD-9 : 789.04

ICD-10 : R10.32

                                                    2017

 

                                        Appointment: Akanksha Driver

WPtel:[phone]

                                        [address] [place], [place] [address]

                                              ACUTE ILLNESS   2017

 

             Patient Education: Patient Medication Summary                      

     Completed    2017

 

                                        Appointment: Akanksha Driver

WPtel:[phone]

                                        [address] [place], [place] [address]                                              LAB             2017

 

             Patient Education: Patient Medication Summary                      

     Completed    2017

 

                          Visit Diagnosis Plan: Mixed hyperlipidemia Discussion:

 Check lipids

                                        ICD-9 : 272.4

ICD-10 : E78.2

                                                    2017

 

                          Visit Diagnosis Plan: Impaired fasting glucose Discuss

ion: Return in AM for CMP,

HbA1C Accuchecks daily Diet/Exercise discussed at length

                                        ICD-9 : 790.29

ICD-10 : R73.01

                                                    2017

 

                          Visit Diagnosis Plan: Other fatigue Discussion: Check 

CBC, TSH

                                        ICD-9 : 780.79

ICD-10 : R53.83

                                                    2017

 

                          Visit Diagnosis Plan: Mastodynia Discussion: Check Jace

ateral diagnostic 

mammogram with US

                                        ICD-9 : 611.71

ICD-10 : N64.4

                                                    2017

 

                                        Appointment: Akanksha Driver

WPtel:[phone]

                                        [address] [place], [place] [id number]              3/7 confirmed `sl                 ACUTE ILLNESS   2017

 

             Patient Education: Patient Medication Summary                      

     Completed    2017

 

                    Care Plan: MAMMOGRAM SCREENING                     LOINC : 2

6347-5

                          Pending                   2017

 

                          Visit Diagnosis Plan: Acute upper respiratory infectio

n, unspecified Discussion:

Exam is reassuring Likely viral illness Supportive care reviewed and encouraged 
Follow up PRN

                                        ICD-9 : 465.9

ICD-10 : J06.9

                                                    2017

 

                                        Appointment: Luba Stevenson 

                                        [address] [place], [place] [address]                                              ACUTE ILLNESS   2017

 

             Patient Education: Patient Medication Summary                      

     Completed    2017

 

                          Visit Plan:               Supportive care. Rest, Fluid

s, Tylenol/Motrin prn fever or 

bodyaches. Notify if worsening symptoms.

                                                            2016

 

                                        Appointment: Akanksha Driver

WPtel:[phone]

                                        [address] [place] [address]                                              ACUTE ILLNESS   2016

 

             Patient Education: Patient Medication Summary                      

     Completed    2016

 

                                        Appointment: Akanksha Driver

WPtel:[phone]

                                        [address] [place], [place] [address]

[place]                                              INJECTION       10/07/2016

 

             Patient Education: Patient Medication Summary                      

     Completed    10/07/2016

 

                                        Appointment: Akanksha Driver

WPtel:[phone]

                                        [address] [place], [place] [id number]

[place]                                              LAB             2016

 

             Patient Education: Patient Medication Summary                      

     Completed    2016

 

                          Visit Plan:               Add miralax daily Use daily 

probiotic and metamucil and push fluids

Notify if worsens/persists

                                                            2016

 

                                        Appointment: Akanksha Driver

WPtel:[phone]

                                        [address] [place] [address]              16 confirmed ~sl                 ACUTE ILLNESS   2016

 

             Patient Education: Patient Medication Summary                      

     Completed    2016

 

                          Visit Plan:               No NSAIDs Kidney function di

scussed Discussed hydration Monitor lab

every 4months so will check CMP, HbA1C next month

                                                            2016

 

                                        Appointment: Akanksha Driver

WPtel:[phone]

                                        [address] [place] [address]              03/15 confirmed ~sl                 ACUTE ILLNESS   2016

 

             Patient Education: Patient Medication Summary                      

     Completed    2016

 

                          Visit Plan:               Vestibular exercises Refill 

flonase Strict low Na diet--discussed 

that needs to read labels Rx for walker with chair given due to muscle 
weakness/unsteadiness Has carotids and abdominal aorta and legs checked in 
January Check Chem 7

                                                            2015

 

                                        Appointment: Akanksha Driver

WPtel:[phone]

                                        [address] [place] [address]              12/15/15 appt confirmed cn                 FOLLOW UP       

 

             Patient Education: Patient Medication Summary                      

     Completed    2015

 

             Patient Education: Vertigo                           Completed    1

2015

 

                                        Appointment: Akanksha Driver

WPtel:[phone]

                                        [address] [place], [place] [id number]

[place]                                              INJECTION       10/16/2015

 

             Patient Education: Patient Medication Summary                      

     Completed    10/16/2015

 

                                        Appointment: Akanksha Driver

WPtel:[phone]

                                        [address] [place], [place] [id number]

[place]                                              UA              09/15/2015

 

             Patient Education: Patient Medication Summary                      

     Completed    09/15/2015

 

                                        Referral: Landon De La Torre

WPtel:[phone]

#1 [facility] Uma Vásquez

[id number]

[place]              Referral                        Completed       2015

 

                                        Appointment: Akanksha Driver

WPtel:[phone]

                                        [address] [place] [address]                                              LAB             09/10/2015

 

             Patient Education: Patient Medication Summary                      

     Completed    09/10/2015

 

                          Visit Plan:               Check CMP, CBC, TSH, Free T4

, B12, Lipids, HbA1C Discussed 6 small 

meals a day each with protein Would likely benefit from antidepressant Update 
colonoscopy

                                                            2015

 

                                        Appointment: Akanksha Driver

WPtel:[phone]

                                        [address] [place] [address]              9/8/15 confirmed                 ACUTE ILLNESS   2015

 

             Patient Education: Patient Medication Summary                      

     Completed    2015

 

                          Visit Plan:               Cerumen flush with warm wate

r and peroxide - good results Resume 

Nasonex nasal spray daily Recommended Debrox earwax removal drops

                                                            2015

 

                                        Appointment: Bertha Mon

WPtel:[phone]

                                        [address] [place] [address]                                              ACUTE ILLNESS   2015

 

             Patient Education: Patient Medication Summary                      

     Completed    2015

 

                          Visit Plan:               Continue aspirin daily Add m

eloxicam for 1week Elevate and Ice Call

on 2015

 

                                        Appointment: Akanksha Driver

WPtel:[phone]

                                        [address] [place] [address]                                              ACUTE ILLNESS   2015

 

             Patient Education: Patient Medication Summary                      

     Completed    2015

 

                          Visit Plan:               Been using baclofen at [facility] and has helped some PT for next 

2-4weeks

                                                            2015

 

                                        Appointment: Akanksha Driver

WPtel:[phone]

                                        [address] [place], [place] [id number] [place] [address] Follow Up 2015

 

             Patient Education: Patient Medication Summary                      

     Completed    2015

 

                                        Appointment: Akanksha Driver

WPtel:[phone]

                                        [address] [place], [place] [id number]                                              LAB             2015

 

                                        Appointment: Akanksha Driver

WPtel:[phone]

                                        [address] [place], [place] [id number]              feeling better, weather -                 FOLLOW UP       

 

                                        Referral: Landon De La Torre

WPtel:[phone]

#1 [facility] [place]

Henri LEE

[id number]

[place]              Referral                        Appointment Requested 2015

 

                          Visit Plan:               Continue exercise and curren

t meds See surgery for removal of right

arm lesion

                                                            2015

 

                                        Appointment: Akanksha Driver

WPtel:[phone]

                                        [address] [place], [place] [id number]                                              FOLLOW UP       2015

 

             Patient Education: Patient Medication Summary                      

     Completed    2015

 

                                        Appointment: Akanksha Driver

WPtel:[phone]

                                        [address] [place], [place] [id number]                                              INJECTION       10/17/2014

 

             Patient Education: Patient Medication Summary                      

     Completed    10/17/2014

 

                                        Appointment: Bertha Mon

WPtel:[phone]

                                        [address] [place], [place] [id number]                                              ACUTE ILLNESS   2014

 

             Patient Education: Patient Medication Summary                      

     Completed    2014

 

                          Visit Plan:               Discussed that likely lumbar

 etiology for leg weakness Will change 

amlodopine to low dose dyazide and see if helps legs and inner ear

                                                            2014

 

                                        Appointment: Akanksha Driver

WPtel:[phone]

                                        [address] [place], [place] [id number]

                                              FOLLOW UP       2014

 

             Patient Education: Patient Medication Summary                      

     Completed    2014

 

                          Visit Plan:               Ceftin and continue claritin

/flonase Decrease amlodopine to 2.5mg q

HS until fwup with Card due to weakness

                                                            2014

 

                                        Appointment: Akanksha Driver

WPtel:[phone]

                                        [address] [place] [address]                                              ACUTE ILLNESS   2014

 

             Patient Education: Patient Medication Summary                      

     Completed    2014

 

                                        Appointment: Akanksha Driver

WPtel:[phone]

                                        [address] [place] [address]                                              LAB             2014

 

             Patient Education: Patient Medication Summary                      

     Completed    2014

 

                                        Appointment: Bertha Mon

WPtel:[phone]

                                        [address] [place] [address]                                              ACUTE ILLNESS   2014

 

             Patient Education: Patient Medication Summary                      

     Completed    2014

 

                          Visit Plan:               Supportive care. Rest, Fluid

s, Tylenol prn fever or bodyaches. 

Notify if worsening symptoms. Ceftin

                                                            10/15/2013

 

                                        Appointment: Bertha Mon

WPtel:[phone]

                                        [address] [place] [address]                                              ACUTE ILLNESS   10/15/2013

 

             Patient Education: Patient Medication Summary                      

     Completed    10/15/2013

 

                                        Appointment: Akanksha Driver

WPtel:[phone]

                                        [address] [place] [address]                                              BP CHECK        2013

 

             Patient Education: Patient Medication Summary                      

     Completed    2013

 

                                        Appointment: Akanksha Driver

WPtel:[phone]

                                        [address] [place] [address]                                              ER Follow UP    2013

 

             Patient Education: Patient Medication Summary                      

     Completed    2013

 

                          Visit Plan:               Restart flonase BID Use mecl

izine 25mg q HS Vestibular exercises 

Claritin 10mg q AM See ENT if doesn't resolve

                                                            2013

 

                                        Appointment: Akanksha Driver

WPtel:[phone]

                                        [address] [place] [address]                                              ACUTE ILLNESS   2013

 

             Patient Education: Patient Medication Summary                      

     Completed    2013

 

                          Visit Plan:               Will continue to observe

                                                            2013

 

                                        Appointment: Akanksha Driver

WPtel:[phone]

                                        [address] [place] [address]                                              FOLLOW UP       2013

 

             Patient Education: Patient Medication Summary                      

     Completed    2013

 

                          Visit Plan:               ERx for Augmentin 875mg q12 

x 10 days and Floxin otic drops 5 gtts 

AD x7days Sample of Nasonex per pt request Discussed treatment (nasal saline, 
salt water gargles, keeping right ear clean and dry, for worsening go to UC on 
weekend, Tylenol/ibuprofen, etc.) RTC 2 weeks for ear recheck.

                                                            05/10/2013

 

                                        Appointment: Jill Jean 

WPtel:[phone]

                                        [address] [place] [address]                                              ACUTE ILLNESS   05/10/2013

 

             Patient Education: Patient Medication Summary                      

     Completed    05/10/2013

 

                          Visit Plan:               Decrease caffeine intake Marina

ck Bilateral Mammogram with US of left 

breast

                                                            2013

 

                                        Appointment: Akanksha Driver

WPtel:[phone]

                                        [address] [place] [address]                                              ACUTE ILLNESS   2013

 

             Patient Education: Patient Medication Summary                      

     Completed    2013

 

                                        Appointment: Kate Hall

WPtel:[phone]

                                        [address] [place] [address]              appt scheduled                  ACUTE ILLNESS   2013

 

                                        Appointment: Akanksha Driver

WPtel:[phone]

                                        [address] [place] [address]

[place]                                              LAB             2012

 

             Patient Education: Patient Medication Summary                      

     Completed    2012

 

                          Visit Plan:               Continue off carafate and se

e how does Continue probiotic Add 

Vestibular exercises and use meclizine prn Continue Nasonex Check fasting lab 
including CMP, Lipids, CBC, TSH, Free T4, HbA1C

                                                            2012

 

                                        Appointment: Akanksha Driver

WPtel:[phone]

                                        [address] [place] [address]                                              FOLLOW UP       2012

 

             Patient Education: Patient Medication Summary                      

     Completed    2012

 

                          Visit Plan:               Continue carafate for 4more 

weeks at current dose then decrease to 

BID for 2wks then q HS

                                                            10/23/2012

 

                                        Appointment: Akanksha Drivertel:[phone]

                                        [address] [place] [address]                                              FOLLOW UP       10/23/2012

 

             Patient Education: Patient Medication Summary                      

     Completed    10/23/2012

 

                          Visit Plan:               Continue omeprazole Add Ellyn

fate 1gm po q AC Add daily probiotic

                                                            10/10/2012

 

                                        Appointment: Akanksha Driver

WPtel:[phone]

                                        [address] [place] [address]                                              ACUTE ILLNESS   10/10/2012

 

             Patient Education: Patient Medication Summary                      

     Completed    10/10/2012

 

                                        Appointment: Akanksha Drivertel:[phone]

                                        [address] [place] [address]

US                                              INJECTION       2012

 

             Patient Education: Patient Medication Summary                      

     Completed    2012

 

                          Visit Plan:               Diabetic Diet Accuchecks BID

 alternating times Hold onglyza and 

Januvia for now and continue diet and exercise and weight loss and moniter BS 
Discussed hypoglycemia and snack of peanut butter and crackers with juice or 
milk

                                                            2012

 

                                        Appointment: Akanksha Drivertel:[phone]

                                        [address] [place] [address]                                              WORK IN         2012

 

             Patient Education: Patient Medication Summary                      

     Completed    2012

 

                                        Appointment: Akanksha Drivertel:[phone]

                                        [address] [place], [place] [id number]                                              UA              2012

 

             Patient Education: Patient Medication Summary                      

     Completed    2012

 

                                        Appointment: Akanksha Drivertel:[phone]

                                        [address] [place], [place] [address]                                              LAB             2012

 

             Patient Education: Patient Medication Summary                      

     Completed    2012

 

                                        Appointment: Akanksha Drivertel:[phone]

                                        [address] [place], [place] [address]                                              ACUTE ILLNESS   2012

 

             Patient Education: Patient Medication Summary                      

     Completed    2012

 

                          Visit Plan:               Injection to Right SI joint 

as above Pt will call in 3 days on pain

Has PT starting in 2wks.

                                                            2012

 

                                        Appointment: Akanksha Drivertel:[phone]

                                        [address] [place] [address]                                              ACUTE ILLNESS   2012

 

             Patient Education: Patient Medication Summary                      

     Completed    2012

 

                          Visit Plan:               OMT done Start PT May need u

pdated MRI if need to consider epidural

Prednisone

                                                            2012

 

                                        Appointment: Akanksha Driver

WPtel:[phone]

                                        [address] [place] [address]                                              OMT             2012

 

             Patient Education: Patient Medication Summary                      

     Completed    2012

 

                          Visit Plan:               Flexeril and Vimovo OMT done

 Daily stretches

                                                            2012

 

                                        Appointment: Akanksha Driver

WPtel:[phone]

                                        [address] [place] [address]                                              ACUTE ILLNESS   2012

 

             Patient Education: Patient Medication Summary                      

     Completed    2012

 

                          Visit Plan:               OMT done Daily stretches Vinod

st heat or biofreeze Right SI joint 

injection Call in 1week Vimovo BID

                                                            2012

 

                                        Appointment: Akanksha Drivertel:[phone]

                                        [address] [place] [address]                                              ACUTE ILLNESS   2012

 

             Patient Education: Patient Medication Summary                      

     Completed    2012

 

                                        Appointment: Akanksha Drivertel:[phone]

                                        [address] [place] [address]

[place]                                              LAB             2012

 

             Patient Education: Patient Medication Summary                      

     Completed    2012

 

                          Visit Plan:               Decrease amlodopine to 1.25m

g daily Schedule with Cardiology 

Fasting lab in AM

                                                            2012

 

                                        Appointment: Akanksha Drivertel:[phone]

                                        [address] [place] [address]                                              ACUTE ILLNESS   2012

 

             Patient Education: Patient Medication Summary                      

     Completed    2012

 

                          Visit Plan:               Saline nasal flushes prn. Ty

lenol/Motrin prn headache. Notify if 

persists/symptoms worsening. Cerumen removal from ears as above

                                                            2011

 

                                        Appointment: Akanksha Driver

WPtel:[phone]

                                        [address] [place] [address]                                              ACUTE ILLNESS   2011

 

             Patient Education: Patient Medication Summary                      

     Completed    2011

 

                                        Appointment: Akanksha Driver

WPtel:[phone]

                                        [address] [place] [address]

[place]                                              INJECTION       10/05/2011

 

             Patient Education: Patient Medication Summary                      

     Completed    10/05/2011

 

                          Visit Plan:               Continue vimovo for 1more we

ek Increase Omeprazole to BID for 1mo 

then resume QD if stomach improved Vestibular exercises with meclizine q HS for 
next week

                                                            2011

 

                                        Appointment: Akanksha Driver

WPtel:[phone]

                                        [address] [place] [address]                                              FOLLOW UP       2011

 

             Patient Education: Patient Medication Summary                      

     Completed    2011

 

                                        Appointment: Akanksha Driver

WPtel:[phone]

                                        [address] [place] [address]                                              ACUTE ILLNESS   2011

 

             Patient Education: Patient Medication Summary                      

     Completed    2011

 

                                        Appointment: Akanksha Driver

WPtel:[phone]

                                        [address] [place] [address]                                              LAB             2011

 

             Patient Education: Patient Medication Summary                      

     Completed    2011

 

                          Visit Plan:               Saline nasal flushes prn. Ty

lenol/Motrin prn headache. Notify if 

persists/symptoms worsening. Add Veramyst Increase Omeprazole to 20mg po BID

                                                            2011

 

                                        Appointment: Akanksha Driver

WPtel:[phone]

                                        [address] [place] [address]                                              ACUTE ILLNESS   2011

 

             Patient Education: Patient Medication Summary                      

     Completed    2011

 

                                        Appointment: Akanksha Driver

WPtel:[phone]

                                        [address] [place] [address]                                              FOLLOW UP       2011

 

             Patient Education: Patient Medication Summary                      

     Completed    2011

 

                                        Appointment: Akanksha Driver

WPtel:[phone]

                                        [address] [place] [address]                                              ACUTE ILLNESS   2011

 

             Patient Education: Patient Medication Summary                      

     Completed    2011

 

                          Visit Plan:               Nasocourt sample given. Pt. 

will notify if symptoms are worse on 

Monday.

                                                            2010

 

                                        Appointment: Kate Hall

WPtel:[phone]

                                        [address] [place] [address]                                              ACUTE ILLNESS   2010

 

             Patient Education: Patient Medication Summary                      

     Completed    2010

 

                                        Appointment: Akanksha Driver

WPtel:[phone]

                                        [address] [place] [address]

[place]                                              INJECTION       10/06/2010

 

             Patient Education: Patient Medication Summary                      

     Completed    10/06/2010

 

                                        Appointment: Akanksha Driver

WPtel:[phone]

                                        [address] [place] [address]                                              FOLLOW UP       2010

 

             Patient Education: Patient Medication Summary                      

     Completed    2010

 

             Patient Education: Lexapro                           Completed    0

2010

 

                          Visit Plan:               May proceed with hiatal mykel

ia repair per Card. so will contact Dr. Cash's office to proceed with surgery Trial of Lexapro 5mg QD plus use 
xanax prn

                                                            2010

 

                                        Appointment: Akanksha Driver

WPtel:[phone]

                                        [address] [place] [address]                                              FOLLOW UP       2010

 

             Patient Education: Patient Medication Summary                      

     Completed    2010

 

                          Visit Plan:               B12 given Cont oral B12 and 

iron Fwup 1mo for B12 Proceed with 

hiatal hernia repair once card clearance

                                                            2010

 

                                        Appointment: Akanksha Driver

WPtel:[phone]

                                        [address] [place] [address]                                              FOLLOW UP       2010

 

             Patient Education: Patient Medication Summary                      

     Completed    2010

 

                                        Appointment: Akanksha Driver

WPtel:[phone]

                                        [address] [place] [address]                                              FOLLOW UP       2010

 

             Patient Education: Patient Medication Summary                      

     Completed    2010

 

                                        Appointment: Akanksha Driver

WPtel:[phone]

                                        [address]

[place]                                              LAB             2010

 

             Patient Education: Patient Medication Summary                      

     Completed    2010

 

                          Visit Plan:               Check fasting lab in AM--CMP

,Lipids, CBC, Vit D, TSH,FreeT4, B12

                                                            2010

 

                                        Appointment: Akanksah Driver

WPtel:[phone]

                                        [address] [id number]

                                              FOLLOW UP       2010

 

             Patient Education: Patient Medication Summary                      

     Completed    2010

 

                                        Referral: Landon De La Torre

WPtel:[phone]

[address] [id number]

              Referral                        Appointment Requested  

 

                                        Referral: Landon De La Torre

WPtel:[phone]

[address] [id number]

              Referral                        Initiated        







Instructions





                                        Comment

 

                                        . Supportive care.  Rest, Fluids, Tyleno

l/Motrin prn fever or bodyaches.  Notify

if worsening symptoms.



 

                                        . Add miralax daily

Use daily probiotic and metamucil and push fluids

Notify if worsens/persists

 

                                        . No NSAIDs

Kidney function discussed

Discussed hydration 

Monitor lab every 4months so will check CMP, HbA1C next month

 

                                        . Vestibular exercises

Refill flonase

Strict low Na diet--discussed that needs to read labels

Rx for walker with chair given due to muscle weakness/unsteadiness

Has carotids and abdominal aorta and legs checked in January

Check Chem 7



 

                                        . Check CMP, CBC, TSH, Free T4, B12, Lip

ids, HbA1C

Discussed 6 small meals a day each with protein

Would likely benefit from antidepressant 

Update colonoscopy

 

                                        . Cerumen flush with warm water and tyler

xide - good results 

Resume Nasonex nasal spray daily

Recommended Debrox earwax removal drops 

 

                                        . Continue aspirin daily

Add meloxicam for 1week

Elevate and Ice

Call on Monday

 

                                        . Been using baclofen at bedtime and has

 helped some

PT for next 2-4weeks



 

                                        . Continue exercise and current meds

See surgery for removal of right arm lesion

 

                                        . Discussed that likely lumbar etiology 

for leg weakness

Will change amlodopine to low dose dyazide and see if helps legs and inner ear

 

                                        . Ceftin and continue claritin/flonase

Decrease amlodopine to 2.5mg q HS until fwup with Card due to weakness

 

                                        .  Supportive care.  Rest, Fluids, Tylen

ol prn fever or bodyaches.  Notify if 

worsening symptoms.

Ceftin

 

                                        . Restart flonase BID

Use meclizine 25mg q HS

Vestibular exercises

Claritin 10mg q AM

See ENT if doesn't resolve

 

                                        . Will continue to observe



 

                                        . ERx for Augmentin 875mg q12 x 10 days 

and Floxin otic drops 5 gtts AD x7days

Sample of Nasonex per pt request

Discussed treatment (nasal saline, salt water gargles, keeping right ear clean 
and dry, for worsening go to UC on weekend, Tylenol/ibuprofen, etc.)

RTC 2 weeks for ear recheck.

 

                                        . Decrease caffeine intake

Check Bilateral Mammogram with US of left breast

 

                                        Left ear flushed with warm water and per

oxide with ear syringe and then last 

removed with currette--peroxide drops instilled.  Tolerated well, no 
complications, TM intact.. Continue off carafate and see how does

Continue probiotic

Add Vestibular exercises and use meclizine prn

Continue Nasonex

Check fasting lab including CMP, Lipids, CBC, TSH, Free T4, HbA1C

 

                                        . Continue carafate for 4more weeks at c

urrent dose then decrease to BID for 

2wks then q HS

 

                                        . Continue omeprazole

Add Carafate 1gm po q AC

Add daily probiotic



 

                                        . Diabetic Diet

Accuchecks BID alternating times

Hold onglyza and Januvia for now and continue diet and exercise and weight loss 
and moniter BS

Discussed hypoglycemia and snack of peanut butter and crackers with juice or 
milk

 

                                        Informed Consent obtainded.  Right SI yasir

int cleansed with alcohol and betadine 

and injected with 3cc 1%l lidocaine with 40mg depomedrol and 40mg kenalog, 
tolerated well with no complications, neosporin and bandage applied. Injection 
to Right SI joint as above

Pt will call in 3 days on pain

Has PT starting in 2wks.

 

                                        . OMT done

Start PT

May need updated MRI if need to consider epidural

Prednisone

 

                                        . Flexeril and Vimovo

OMT done

Daily stretches

 

                                        Right SI joint cleansed with alchohol an

d betadine and injected with 3cc 1% 

lidocaine with 40mg depomedrol and 40mg kenalog, tolerated well with no 
complications, neosporin and bandage applied. OMT done

Daily stretches

Moist heat or biofreeze

Right SI joint injection

Call in 1week

Vimovo BID

 

                                        . Decrease amlodopine to 1.25mg daily

Schedule with Cardiology

Fasting lab in AM

 

                                        .  Saline nasal flushes prn. Tylenol/Mot

rin prn headache.  Notify if 

persists/symptoms worsening.

Cerumen removal from ears as above



 

                                        . Continue vimovo for 1more week

Increase Omeprazole to BID for 1mo then resume QD if stomach improved

Vestibular exercises with meclizine q HS for next week

 

                                        . Saline nasal flushes prn. Tylenol/Motr

in prn headache.  Notify if 

persists/symptoms worsening.

Add Veramyst

Increase Omeprazole to 20mg po BID

 

                                        . Nasocourt sample given.  Pt. will noti

fy if symptoms are worse on Monday. 

 

                                        . May proceed with hiatal hernia repair 

per Card. so will contact Dr. Cash's office to proceed with surgery



Trial of Lexapro 5mg QD plus use xanax prn

 

                                        . B12 given

Cont oral B12 and iron

Fwup 1mo for B12

Proceed with hiatal hernia repair once card clearance

 

                                        . Check fasting lab in AM--CMP,Lipids, C

BC, Vit D, TSH,FreeT4, B12







Medical Equipment

No Medical Equipment data



Health Concerns Section

Health Concerns data not found



Goals Section

Goals data not found



Interventions Section

Interventions data not found



Health Status Evaluations/Outcomes Section

Health Status Evaluations/Outcomes data not found



Advance Directives

No Advance Directive data

## 2020-03-13 ENCOUNTER — HOSPITAL ENCOUNTER (OUTPATIENT)
Dept: HOSPITAL 75 - CR3 | Age: 80
LOS: 19 days | Discharge: HOME | End: 2020-04-01
Attending: FAMILY MEDICINE
Payer: MEDICARE

## 2020-03-13 DIAGNOSIS — Z29.8: Primary | ICD-10-CM

## 2020-09-30 ENCOUNTER — HOSPITAL ENCOUNTER (OUTPATIENT)
Dept: HOSPITAL 75 - PREOP | Age: 80
Discharge: HOME | End: 2020-09-30
Attending: SPECIALIST
Payer: MEDICARE

## 2020-09-30 VITALS — HEIGHT: 62.01 IN | BODY MASS INDEX: 38.05 KG/M2 | WEIGHT: 209.44 LBS

## 2020-09-30 DIAGNOSIS — Z01.818: Primary | ICD-10-CM

## 2020-10-02 ENCOUNTER — HOSPITAL ENCOUNTER (OUTPATIENT)
Dept: HOSPITAL 75 - SDC | Age: 80
Discharge: HOME | End: 2020-10-02
Attending: SPECIALIST
Payer: MEDICARE

## 2020-10-02 VITALS — SYSTOLIC BLOOD PRESSURE: 175 MMHG | DIASTOLIC BLOOD PRESSURE: 69 MMHG

## 2020-10-02 VITALS — SYSTOLIC BLOOD PRESSURE: 161 MMHG | DIASTOLIC BLOOD PRESSURE: 79 MMHG

## 2020-10-02 VITALS — WEIGHT: 209.44 LBS | BODY MASS INDEX: 38.05 KG/M2 | HEIGHT: 62.01 IN

## 2020-10-02 DIAGNOSIS — H25.12: Primary | ICD-10-CM

## 2020-10-02 DIAGNOSIS — F32.9: ICD-10-CM

## 2020-10-02 DIAGNOSIS — E78.5: ICD-10-CM

## 2020-10-02 DIAGNOSIS — E78.00: ICD-10-CM

## 2020-10-02 DIAGNOSIS — F41.9: ICD-10-CM

## 2020-10-02 DIAGNOSIS — G47.00: ICD-10-CM

## 2020-10-02 DIAGNOSIS — K21.9: ICD-10-CM

## 2020-10-02 DIAGNOSIS — Z87.19: ICD-10-CM

## 2020-10-02 DIAGNOSIS — Z96.651: ICD-10-CM

## 2020-10-02 DIAGNOSIS — I10: ICD-10-CM

## 2020-10-02 DIAGNOSIS — E66.9: ICD-10-CM

## 2020-10-02 DIAGNOSIS — Z95.1: ICD-10-CM

## 2020-10-02 DIAGNOSIS — I25.2: ICD-10-CM

## 2020-10-02 DIAGNOSIS — Z79.899: ICD-10-CM

## 2020-10-02 PROCEDURE — 66984 XCAPSL CTRC RMVL W/O ECP: CPT

## 2020-10-02 RX ADMIN — CYCLOPENTOLATE HYDROCHLORIDE SCH ML: 10 SOLUTION/ DROPS OPHTHALMIC at 07:33

## 2020-10-02 RX ADMIN — TETRACAINE HYDROCHLORIDE PRN ML: 5 SOLUTION OPHTHALMIC at 07:24

## 2020-10-02 RX ADMIN — CYCLOPENTOLATE HYDROCHLORIDE SCH ML: 10 SOLUTION/ DROPS OPHTHALMIC at 07:45

## 2020-10-02 RX ADMIN — TETRACAINE HYDROCHLORIDE PRN ML: 5 SOLUTION OPHTHALMIC at 07:38

## 2020-10-02 RX ADMIN — TETRACAINE HYDROCHLORIDE PRN ML: 5 SOLUTION OPHTHALMIC at 07:32

## 2020-10-02 RX ADMIN — TETRACAINE HYDROCHLORIDE PRN ML: 5 SOLUTION OPHTHALMIC at 07:45

## 2020-10-02 RX ADMIN — PHENYLEPHRINE HYDROCHLORIDE SCH ML: 100 SOLUTION/ DROPS OPHTHALMIC at 07:45

## 2020-10-02 RX ADMIN — PHENYLEPHRINE HYDROCHLORIDE SCH ML: 100 SOLUTION/ DROPS OPHTHALMIC at 07:33

## 2020-10-02 RX ADMIN — PHENYLEPHRINE HYDROCHLORIDE SCH ML: 100 SOLUTION/ DROPS OPHTHALMIC at 07:38

## 2020-10-02 RX ADMIN — CYCLOPENTOLATE HYDROCHLORIDE SCH ML: 10 SOLUTION/ DROPS OPHTHALMIC at 07:38

## 2020-10-02 NOTE — ANESTHESIA-GENERAL POST-OP
MAC


Patient Condition


Mental Status/LOC:  Same as Preop


Cardiovascular:  Satisfactory


Nausea/Vomiting:  Absent


Respiratory:  Satisfactory


Pain:  Controlled


Complications:  Absent





Post Op Complications


Complications


None





Follow Up Care/Instructions


Patient Instructions


None needed.





Anesthesiology Discharge Order


Discharge Order


Patient is doing well, no complaints, stable vital signs, no apparent adverse 

anesthesia problems.   


No complications reported per nursing.











SAADIA ELENA CRNA            Oct 2, 2020 11:17

## 2020-10-02 NOTE — OPHTHALMOLOGIST PRE-OP NOTE
Pre-Operative Progress Note


H&P Reviewed


The H&P was reviewed, patient examined and no changes noted.


Date H&P Reviewed:  Oct 2, 2020


Time H&P Reviewed:  07:59


Pre-Op Dx


Cataract, Left Eye











NANCY KING MD              Oct 2, 2020 07:59

## 2020-10-02 NOTE — OPHTHALMOLOGY OPERATIVE REPORT
Cataract removal/placement IOL


PREOPERATIVE DIAGNOSIS:    Cataract Left Eye


POSTOPERATIVE DIAGNOSIS: Cataract Left Eye





PROCEDURE: Cataract removal and placement of posterior chamber implant, left eye





SURGEON: Jimmy King 





ANESTHESIA: Topical with sedation





COMPLICATIONS: None





ESTIMATED BLOOD LOSS: Minimal 





DESCRIPTION OF PROCEDURE:


After proper informed consent was obtained, the patient, a 80 female, was taken 

to the Operating Room and the left eye was anesthetized with tetracaine.  The 

left eye was then prepped and draped in the usual manner.  A wire lid speculum 

was placed. A paracentesis was made at the left hand position. Preservative free

lidocaine was injected into the anterior chamber followed by viscoelastic.  A 

clear corneal incision was made in the temporal position. A capsulorrhexis was 

preformed and the central nuclear and cortical material were removed.  The 

posterior capsule was polished and an Pelon 22.5 AU00T0 was placed into the 

capsular bag. The residual viscoelastic was aspirated and balanced saline 

solution was injected into the anterior chamber.  Moxifloxacin was injected into

the anterior chamber.





The wound was checked and found to be water tight.





The patient tolerated the procedure well without complications.











JIMMY KING MD              Oct 2, 2020 08:21

## 2020-10-23 ENCOUNTER — HOSPITAL ENCOUNTER (OUTPATIENT)
Dept: HOSPITAL 75 - SDC | Age: 80
Discharge: HOME | End: 2020-10-23
Attending: SPECIALIST
Payer: MEDICARE

## 2020-10-23 VITALS — DIASTOLIC BLOOD PRESSURE: 103 MMHG | SYSTOLIC BLOOD PRESSURE: 154 MMHG

## 2020-10-23 VITALS
SYSTOLIC BLOOD PRESSURE: 191 MMHG | WEIGHT: 209.44 LBS | HEIGHT: 61.81 IN | BODY MASS INDEX: 38.54 KG/M2 | DIASTOLIC BLOOD PRESSURE: 85 MMHG

## 2020-10-23 DIAGNOSIS — Z79.899: ICD-10-CM

## 2020-10-23 DIAGNOSIS — E66.9: ICD-10-CM

## 2020-10-23 DIAGNOSIS — Z80.0: ICD-10-CM

## 2020-10-23 DIAGNOSIS — F32.9: ICD-10-CM

## 2020-10-23 DIAGNOSIS — Z90.710: ICD-10-CM

## 2020-10-23 DIAGNOSIS — I10: ICD-10-CM

## 2020-10-23 DIAGNOSIS — M19.90: ICD-10-CM

## 2020-10-23 DIAGNOSIS — G47.00: ICD-10-CM

## 2020-10-23 DIAGNOSIS — Z88.8: ICD-10-CM

## 2020-10-23 DIAGNOSIS — H25.11: Primary | ICD-10-CM

## 2020-10-23 DIAGNOSIS — Z95.1: ICD-10-CM

## 2020-10-23 DIAGNOSIS — F41.9: ICD-10-CM

## 2020-10-23 DIAGNOSIS — E78.00: ICD-10-CM

## 2020-10-23 DIAGNOSIS — K21.9: ICD-10-CM

## 2020-10-23 PROCEDURE — 66984 XCAPSL CTRC RMVL W/O ECP: CPT

## 2020-10-23 RX ADMIN — PHENYLEPHRINE HYDROCHLORIDE SCH ML: 100 SOLUTION/ DROPS OPHTHALMIC at 07:26

## 2020-10-23 RX ADMIN — TROPICAMIDE SCH ML: 10 SOLUTION/ DROPS OPHTHALMIC at 07:26

## 2020-10-23 RX ADMIN — TETRACAINE HYDROCHLORIDE PRN ML: 5 SOLUTION OPHTHALMIC at 07:09

## 2020-10-23 RX ADMIN — TETRACAINE HYDROCHLORIDE PRN ML: 5 SOLUTION OPHTHALMIC at 07:25

## 2020-10-23 RX ADMIN — PHENYLEPHRINE HYDROCHLORIDE SCH ML: 100 SOLUTION/ DROPS OPHTHALMIC at 07:41

## 2020-10-23 RX ADMIN — TETRACAINE HYDROCHLORIDE PRN ML: 5 SOLUTION OPHTHALMIC at 07:41

## 2020-10-23 RX ADMIN — TROPICAMIDE SCH ML: 10 SOLUTION/ DROPS OPHTHALMIC at 07:41

## 2020-10-23 RX ADMIN — TROPICAMIDE SCH ML: 10 SOLUTION/ DROPS OPHTHALMIC at 07:33

## 2020-10-23 RX ADMIN — PHENYLEPHRINE HYDROCHLORIDE SCH ML: 100 SOLUTION/ DROPS OPHTHALMIC at 07:33

## 2020-10-23 RX ADMIN — TETRACAINE HYDROCHLORIDE PRN ML: 5 SOLUTION OPHTHALMIC at 07:33

## 2020-10-23 NOTE — ANESTHESIA-GENERAL POST-OP
MAC


Patient Condition


Mental Status/LOC:  Same as Preop


Cardiovascular:  Satisfactory


Nausea/Vomiting:  Absent


Respiratory:  Satisfactory


Pain:  Controlled


Complications:  Absent





Post Op Complications


Complications


None





Follow Up Care/Instructions


Patient Instructions


None needed.





Anesthesiology Discharge Order


Discharge Order


Patient is doing well, no complaints, stable vital signs, no apparent adverse 

anesthesia problems.   


No complications reported per nursing.











SAADIA ELENA CRNA           Oct 23, 2020 13:53

## 2020-10-23 NOTE — OPHTHALMOLOGY OPERATIVE REPORT
Cataract removal/placement IOL


PREOPERATIVE DIAGNOSIS: Cataract Right Eye


POSTOPERATIVE DIAGNOSIS: Cataract Right Eye





PROCEDURE: Cataract removal and placement of posterior chamber implant, right 

eye





SURGEON: Jimmy King 





ANESTHESIA: Topical with sedation





COMPLICATIONS: None





ESTIMATED BLOOD LOSS: Minimal 





DESCRIPTION OF PROCEDURE:


After proper informed consent was obtained, the patient, a 80 female, was taken 

to the Operating Room and the right eye was anesthetized with tetracaine.  The 

right eye was then prepped and draped in the usual manner.  A wire lid speculum 

was placed. A paracentesis was made at the left hand position. Preservative free

lidocaine was injected into the anterior chamber followed by viscoelastic.  A 

clear corneal incision was made in the temporal position. A capsulorrhexis was 

preformed and the central nuclear and cortical material were removed.  The 

posterior capsule was polished and Pelon 23.0 AU00T0  IOL was placed into the 

capsular bag. The residual viscoelastic was aspirated and balanced saline 

solution was injected into the anterior chamber. Moxifloxacin  was injected into

the anterior chamber.





The wound was checked and found to be water tight.





The patient tolerated the procedure well without complications.











JIMMY KING MD             Oct 23, 2020 08:22

## 2020-10-23 NOTE — OPHTHALMOLOGIST PRE-OP NOTE
Pre-Operative Progress Note


H&P Reviewed


The H&P was reviewed, patient examined and no changes noted.


Date H&P Reviewed:  Oct 23, 2020


Time H&P Reviewed:  08:01


Pre-Op Dx


Cataract, Right Eye











NANCY KING MD             Oct 23, 2020 08:01

## 2021-03-22 ENCOUNTER — HOSPITAL ENCOUNTER (OUTPATIENT)
Dept: HOSPITAL 75 - CR3 | Age: 81
LOS: 23 days | Discharge: HOME | End: 2021-04-14
Attending: FAMILY MEDICINE
Payer: MEDICARE

## 2021-03-22 DIAGNOSIS — Z29.8: Primary | ICD-10-CM

## 2021-04-26 ENCOUNTER — HOSPITAL ENCOUNTER (EMERGENCY)
Dept: HOSPITAL 75 - ER | Age: 81
Discharge: HOME | End: 2021-04-26
Payer: MEDICARE

## 2021-04-26 VITALS — BODY MASS INDEX: 38.54 KG/M2 | WEIGHT: 209.44 LBS | HEIGHT: 61.81 IN

## 2021-04-26 VITALS — DIASTOLIC BLOOD PRESSURE: 83 MMHG | SYSTOLIC BLOOD PRESSURE: 161 MMHG

## 2021-04-26 DIAGNOSIS — K21.9: ICD-10-CM

## 2021-04-26 DIAGNOSIS — F41.9: Primary | ICD-10-CM

## 2021-04-26 DIAGNOSIS — I10: ICD-10-CM

## 2021-04-26 DIAGNOSIS — E78.00: ICD-10-CM

## 2021-04-26 DIAGNOSIS — Z88.8: ICD-10-CM

## 2021-04-26 DIAGNOSIS — Z79.82: ICD-10-CM

## 2021-04-26 DIAGNOSIS — E11.9: ICD-10-CM

## 2021-04-26 DIAGNOSIS — I25.2: ICD-10-CM

## 2021-04-26 DIAGNOSIS — R00.2: ICD-10-CM

## 2021-04-26 LAB
ALBUMIN SERPL-MCNC: 4.1 GM/DL (ref 3.2–4.5)
ALP SERPL-CCNC: 81 U/L (ref 40–136)
ALT SERPL-CCNC: 14 U/L (ref 0–55)
APTT BLD: 26 SEC (ref 24–35)
BASOPHILS # BLD AUTO: 0.1 10^3/UL (ref 0–0.1)
BASOPHILS NFR BLD AUTO: 1 % (ref 0–10)
BILIRUB SERPL-MCNC: 0.4 MG/DL (ref 0.1–1)
BUN/CREAT SERPL: 15
CALCIUM SERPL-MCNC: 9.6 MG/DL (ref 8.5–10.1)
CHLORIDE SERPL-SCNC: 105 MMOL/L (ref 98–107)
CO2 SERPL-SCNC: 21 MMOL/L (ref 21–32)
CREAT SERPL-MCNC: 1.18 MG/DL (ref 0.6–1.3)
D DIMER PPP FEU-MCNC: 1.16 UG/ML (ref 0–0.49)
EOSINOPHIL # BLD AUTO: 0.3 10^3/UL (ref 0–0.3)
EOSINOPHIL NFR BLD AUTO: 3 % (ref 0–10)
GFR SERPLBLD BASED ON 1.73 SQ M-ARVRAT: 44 ML/MIN
GLUCOSE SERPL-MCNC: 103 MG/DL (ref 70–105)
HCT VFR BLD CALC: 37 % (ref 35–52)
HGB BLD-MCNC: 11.9 G/DL (ref 11.5–16)
INR PPP: 1 (ref 0.8–1.4)
LYMPHOCYTES # BLD AUTO: 4.5 10^3/UL (ref 1–4)
LYMPHOCYTES NFR BLD AUTO: 51 % (ref 12–44)
MAGNESIUM SERPL-MCNC: 2 MG/DL (ref 1.6–2.4)
MANUAL DIFFERENTIAL PERFORMED BLD QL: NO
MCH RBC QN AUTO: 31 PG (ref 25–34)
MCHC RBC AUTO-ENTMCNC: 32 G/DL (ref 32–36)
MCV RBC AUTO: 96 FL (ref 80–99)
MONOCYTES # BLD AUTO: 0.8 10^3/UL (ref 0–1)
MONOCYTES NFR BLD AUTO: 9 % (ref 0–12)
NEUTROPHILS # BLD AUTO: 3.2 10^3/UL (ref 1.8–7.8)
NEUTROPHILS NFR BLD AUTO: 36 % (ref 42–75)
PLATELET # BLD: 191 10^3/UL (ref 130–400)
PMV BLD AUTO: 10.2 FL (ref 9–12.2)
POTASSIUM SERPL-SCNC: 4 MMOL/L (ref 3.6–5)
PROT SERPL-MCNC: 7 GM/DL (ref 6.4–8.2)
PROTHROMBIN TIME: 13.8 SEC (ref 12.2–14.7)
SODIUM SERPL-SCNC: 138 MMOL/L (ref 135–145)
WBC # BLD AUTO: 8.9 10^3/UL (ref 4.3–11)

## 2021-04-26 PROCEDURE — 85730 THROMBOPLASTIN TIME PARTIAL: CPT

## 2021-04-26 PROCEDURE — 93041 RHYTHM ECG TRACING: CPT

## 2021-04-26 PROCEDURE — 85379 FIBRIN DEGRADATION QUANT: CPT

## 2021-04-26 PROCEDURE — 84484 ASSAY OF TROPONIN QUANT: CPT

## 2021-04-26 PROCEDURE — 36415 COLL VENOUS BLD VENIPUNCTURE: CPT

## 2021-04-26 PROCEDURE — 83874 ASSAY OF MYOGLOBIN: CPT

## 2021-04-26 PROCEDURE — 83735 ASSAY OF MAGNESIUM: CPT

## 2021-04-26 PROCEDURE — 93005 ELECTROCARDIOGRAM TRACING: CPT

## 2021-04-26 PROCEDURE — 83880 ASSAY OF NATRIURETIC PEPTIDE: CPT

## 2021-04-26 PROCEDURE — 85025 COMPLETE CBC W/AUTO DIFF WBC: CPT

## 2021-04-26 PROCEDURE — 85610 PROTHROMBIN TIME: CPT

## 2021-04-26 PROCEDURE — 80053 COMPREHEN METABOLIC PANEL: CPT

## 2021-04-26 PROCEDURE — 71045 X-RAY EXAM CHEST 1 VIEW: CPT

## 2021-04-26 NOTE — DIAGNOSTIC IMAGING REPORT
INDICATION: Chest pain, bypass.



FINDINGS: Sternal wires midline. Heart size stable. Some trace

linear scarring or atelectasis on the left, unchanged. No

pneumonia, edema, effusion or pneumothorax. 



IMPRESSION: Stable chronic findings. 



Dictated by: 



  Dictated on workstation # [id number]

## 2021-04-26 NOTE — ED CARDIAC GENERAL
History of Present Illness


General


Stated Complaint:  PALPITATIONS


Source:  patient


Exam Limitations:  no limitations


 (CORNELIA WALKER)





History of Present Illness


Date Seen by Provider:  Apr 26, 2021


Time Seen by Provider:  21:34


Initial Comments


To ER with reports of palpitations that began about an hour before coming here. 

She has no history of atrial fibrillation.  She took her evening dose of one 

half of a Valium tablet but still feels anxious and a sensation of palpitations.

 History of CABG in 2007.  She follows with cardiology from [facility].


Timing/Duration:  1-3 hours


Severity:  moderate


Location:  central


Prior CP/Workup:  no prior chest pain


NTG SL PTA:  No


ASA po PTA:  No


 (CORNELIA WALKER)





Allergies and Home Medications


Allergies


Coded Allergies:  


     quinine (Verified  Allergy, Mild, RASH, 1/3/19)





Home Medications


ALPRAZolam 0.25 Mg Tablet, 0.25 MG PO BID, (Reported)


Acetaminophen 500 Mg Tablet, 500 MG PO HS, (Reported)


Aspirin 81 Mg Tab.chew, 81 MG PO DAILY, (Reported)


Calcium Carbonate/Vitamin D3 1 Each Tablet, 1 EACH PO DAILY, (Reported)


Isosorbide Mononitrate 30 Mg Tab.er.24h, 30 MG PO HS, (Reported)


Melatonin/Pyridoxine 1 Each Tablet, 1 EACH PO HS, (Reported)


Metoprolol Tartrate 100 Mg Tablet, 50 MG PO BID, (Reported)


Multivitamin 1 Each Capsule, 1 EACH PO DAILY, (Reported)


Omega 3 Polyunsat Fatty Acids 1,000 Mg Cap, 1,000 MG PO DAILY, (Reported)


Omeprazole 40 Mg Capsule.dr, 40 MG PO DAILY, (Reported)


Simvastatin 40 Mg Tablet, 40 MG PO HS, (Reported)


Ubidecarenone 100 Mg Capsule, 100 MG PO DAILY, (Reported)





Patient Home Medication List


Home Medication List Reviewed:  Yes


 (CORNELIA WALKER)





Review of Systems


Review of Systems


Constitutional:  see HPI


EENTM:  No Symptoms Reported


Respiratory:  No Symptoms Reported


Cardiovascular:  See HPI; Denies Chest Pain; Irregular Heart Rate, Palpitations


Gastrointestinal:  See HPI, Abdominal Pain


Genitourinary:  No Symptoms Reported


Musculoskeletal:  no symptoms reported


Skin:  no symptoms reported


Psychiatric/Neurological:  No Symptoms Reported


Endocrine:  No Symptoms Reported


Hematologic/Lymphatic:  No Symptoms Reported (CORNELIA WALKER)





Past Medical-Social-Family Hx


Patient Social History


2nd Hand Smoke Exposure:  No


Recent Hopitalizations:  No


 (CORNELIA WALKER)





Immunizations Up To Date


Date of Pneumonia Vaccine:  Dec 6, 2010


Date of Influenza Vaccine:  Oct 8, 2018


 (CORNELIA WALKER)





Seasonal Allergies


Seasonal Allergies:  No


 (CORNELIA WALKER)





Past Medical History


Surgeries:  Yes


Abdominal, Appendectomy, Cardiac, CABG, Gallbladder, Hysterectomy, Joint 

Replacement, Orthopedic


Respiratory:  No


Cardiac:  Yes (CLEAN HEART CATH-OCT 2018)


Coronary Artery Disease, Heart Attack, High Cholesterol, Hypertension, 

Peripheral Vascular


Neurological:  No


Reproductive Disorders:  No


GYN History:  Hysterectomy, Menopausal


Sexually Transmitted Disease:  No


Genitourinary:  Yes


UTI-Chronic


Gastrointestinal:  Yes (SPASTIC COLON/HIATAL HERNIA/GI BLEED-ULCERS, gastric 

polyp)


Gastroesophageal Reflux, Gastrointestinal Bleed, Diverticulosis, Hiatal Hernia, 

Ulcer


Musculoskeletal:  Yes (RIGHT KNEE REPLACEMENT)


Arthritis


Endocrine:  Yes ("DIET CONTROLLED", OBESITY)


Diabetes, Non-Insulin dep


HEENT:  No


Cancer:  No


Psychosocial:  Yes


Anxiety


Integumentary:  No


Blood Disorders:  No


Adverse Reaction/Blood Tranf:  No


 (CORNELIA WALKER)





Physical Exam


Vital Signs





Vital Signs - First Documented








 4/26/21





 21:20


 


Temp 36.3


 


Pulse 68


 


Resp 22


 


B/P (MAP) 191/81 (117)


 


O2 Delivery Room Air








 (LAW RICO)


Vital Signs


Capillary Refill :  


 (CORNELIA WALKER)


Height, Weight, BMI


Height: 5'2.00"


Weight: 210lbs. 6.0oz. 95.002594ql; 39.00 BMI


Method:Stated


General Appearance:  No Apparent Distress, WD/WN, Anxious, Other (She reports 

that she still feels a little anxious and does appear so.  Heart rate is narrow 

complex rate of 72 regular no ectopy.  She does report sensation of palpitations

at this time.)


Neck:  Full Range of Motion, Normal Inspection


Respiratory:  Lungs Clear, Normal Breath Sounds, No Accessory Muscle Use, No 

Respiratory Distress


Cardiovascular:  Regular Rate, Rhythm, Normal Peripheral Pulses


Gastrointestinal:  Normal Bowel Sounds, Non Tender, Soft


Neurologic/Psychiatric:  Alert, Oriented x3


Skin:  Normal Color, Warm/Dry (CORNELIA WALKER)





Progress/Results/Core Measures


Results/Orders


Lab Results





Laboratory Tests








Test


 4/26/21


21:27 Range/Units


 


 


White Blood Count


 8.9 


 4.3-11.0


10^3/uL


 


Red Blood Count


 3.89 


 3.80-5.11


10^6/uL


 


Hemoglobin 11.9  11.5-16.0  g/dL


 


Hematocrit 37  35-52  %


 


Mean Corpuscular Volume 96  80-99  fL


 


Mean Corpuscular Hemoglobin 31  25-34  pg


 


Mean Corpuscular Hemoglobin


Concent 32 


 32-36  g/dL





 


Red Cell Distribution Width 13.1  10.0-14.5  %


 


Platelet Count


 191 


 130-400


10^3/uL


 


Mean Platelet Volume 10.2  9.0-12.2  fL


 


Immature Granulocyte % (Auto) 0   %


 


Neutrophils (%) (Auto) 36 L 42-75  %


 


Lymphocytes (%) (Auto) 51 H 12-44  %


 


Monocytes (%) (Auto) 9  0-12  %


 


Eosinophils (%) (Auto) 3  0-10  %


 


Basophils (%) (Auto) 1  0-10  %


 


Neutrophils # (Auto)


 3.2 


 1.8-7.8


10^3/uL


 


Lymphocytes # (Auto)


 4.5 H


 1.0-4.0


10^3/uL


 


Monocytes # (Auto)


 0.8 


 0.0-1.0


10^3/uL


 


Eosinophils # (Auto)


 0.3 


 0.0-0.3


10^3/uL


 


Basophils # (Auto)


 0.1 


 0.0-0.1


10^3/uL


 


Immature Granulocyte # (Auto)


 0.0 


 0.0-0.1


10^3/uL


 


Prothrombin Time 13.8  12.2-14.7  SEC


 


INR Comment 1.0  0.8-1.4  


 


Activated Partial


Thromboplast Time 26 


 24-35  SEC





 


D-Dimer


 1.16 H


 0.00-0.49


UG/ML


 


Sodium Level 138  135-145  MMOL/L


 


Potassium Level 4.0  3.6-5.0  MMOL/L


 


Chloride Level 105    MMOL/L


 


Carbon Dioxide Level 21  21-32  MMOL/L


 


Anion Gap 12  5-14  MMOL/L


 


Blood Urea Nitrogen 18  7-18  MG/DL


 


Creatinine


 1.18 


 0.60-1.30


MG/DL


 


Estimat Glomerular Filtration


Rate 44 


  





 


BUN/Creatinine Ratio 15   


 


Glucose Level 103    MG/DL


 


Calcium Level 9.6  8.5-10.1  MG/DL


 


Corrected Calcium 9.5  8.5-10.1  MG/DL


 


Magnesium Level 2.0  1.6-2.4  MG/DL


 


Total Bilirubin 0.4  0.1-1.0  MG/DL


 


Aspartate Amino Transf


(AST/SGOT) 21 


 5-34  U/L





 


Alanine Aminotransferase


(ALT/SGPT) 14 


 0-55  U/L





 


Alkaline Phosphatase 81    U/L


 


Myoglobin


 65.0 


 10.0-92.0


NG/ML


 


Troponin I < 0.028  <0.028  NG/ML


 


B-Type Natriuretic Peptide 231.6 H <100.0  PG/ML


 


Total Protein 7.0  6.4-8.2  GM/DL


 


Albumin 4.1  3.2-4.5  GM/DL





 (LAW RICO)


Medications Given in ED





Current Medications








 Medications  Dose


 Ordered  Sig/Geraldine


 Route  Start Time


 Stop Time Status Last Admin


Dose Admin


 


 Alprazolam  0.25 mg  ONCE  ONCE


 PO  4/26/21 21:45


 4/26/21 21:46 DC 4/26/21 21:49


0.25 MG


 


 Aspirin  243 mg  ONCE  ONCE


 PO  4/26/21 21:45


 4/26/21 21:46 DC 4/26/21 21:49


243 MG





 (LAW RICO)


Vital Signs/I&O











 4/26/21





 21:20


 


Temp 36.3


 


Pulse 68


 


Resp 22


 


B/P (MAP) 191/81 (117)


 


O2 Delivery Room Air





 (LAW RICO)





Progress


Progress Note :  


   Time:  23:07


Progress Note


The patient is asymptomatic and no longer having the pounding in her chest.  The

pounding in her chest was going on during the EKG as well as she has had normal 

telemetry.  Do not suspect this is related to cardiac dysrhythmia or coronary 

disease.  The Xanax made her symptoms go away.  We have encouraged her to 

follow-up with her primary care provider and discuss long-term strategies for 

anxiety management.  She seems to be having more anxiety issues lately according

to her son.  She was on Lexapro 14 years ago after her heart attack and she said

that helped her for about 6 to 12 months before she got off of it because she 

did not feel she needed it anymore.


 (LAW RICO)





Departure


Impression





   Primary Impression:  


   Anxiety attack


   Additional Impression:  


   Palpitations


Disposition:  01 HOME, SELF-CARE


Condition:  Stable





Departure-Patient Inst.


Decision time for Depature:  22:59


 (LAW RICO)


Referrals:  


MERNA HUMPHRIES DO (PCP/Family)


Primary Care Physician


Patient Instructions:  Panic Disorder (DC), Palpitations (DC)





Add. Discharge Instructions:  


Plan to follow-up with your primary care provider and discuss your palpitations 

and anxiety management.


If you wish to follow-up with your cardiologist for your palpitations this is 

reasonable by calling them for an appointment in the next week or 2.


Do not hesitate to return to the ER promptly if you have chest pain, shortness 

of air or other worrisome symptoms.





Copy


Copies To 1:   MERNA HUMPHRIES PETER J APRN             Apr 26, 2021 21:37


LAW RICO                 Apr 26, 2021 23:10

## 2021-08-26 ENCOUNTER — HOSPITAL ENCOUNTER (OUTPATIENT)
Dept: HOSPITAL 75 - INFUSION | Age: 81
Discharge: HOME | End: 2021-08-26
Attending: NURSE PRACTITIONER
Payer: MEDICARE

## 2021-08-26 VITALS — DIASTOLIC BLOOD PRESSURE: 65 MMHG | SYSTOLIC BLOOD PRESSURE: 115 MMHG

## 2021-08-26 VITALS — DIASTOLIC BLOOD PRESSURE: 68 MMHG | SYSTOLIC BLOOD PRESSURE: 155 MMHG

## 2021-08-26 DIAGNOSIS — U07.1: ICD-10-CM

## 2021-08-26 DIAGNOSIS — Z23: Primary | ICD-10-CM

## 2021-11-09 ENCOUNTER — HOSPITAL ENCOUNTER (OUTPATIENT)
Dept: HOSPITAL 75 - RAD | Age: 81
End: 2021-11-09
Attending: FAMILY MEDICINE
Payer: MEDICARE

## 2021-11-09 DIAGNOSIS — I51.7: Primary | ICD-10-CM

## 2021-11-09 DIAGNOSIS — J40: ICD-10-CM

## 2021-11-09 PROCEDURE — 71046 X-RAY EXAM CHEST 2 VIEWS: CPT

## 2021-11-09 NOTE — DIAGNOSTIC IMAGING REPORT
INDICATION: Cough, bronchitis, short of breath.



FINDINGS: Two views of the chest show mild cardiomegaly with

normal vascularity. There is perihilar discoid atelectasis. No

infiltrates or effusions are seen. There are changes of prior

CABG.



IMPRESSION: There is cardiomegaly with no failure. No acute

abnormality is seen with improved aeration of the lung bases

compared to a prior study from 09/01/2010.



Dictated by: 



  Dictated on workstation # [id number]

## 2022-02-17 ENCOUNTER — HOSPITAL ENCOUNTER (EMERGENCY)
Dept: HOSPITAL 75 - ER | Age: 82
Discharge: HOME | End: 2022-02-17
Payer: COMMERCIAL

## 2022-02-17 VITALS — HEIGHT: 61.89 IN | WEIGHT: 209.44 LBS | BODY MASS INDEX: 38.54 KG/M2

## 2022-02-17 VITALS — DIASTOLIC BLOOD PRESSURE: 75 MMHG | SYSTOLIC BLOOD PRESSURE: 152 MMHG

## 2022-02-17 DIAGNOSIS — E66.9: ICD-10-CM

## 2022-02-17 DIAGNOSIS — Z79.899: ICD-10-CM

## 2022-02-17 DIAGNOSIS — I25.2: ICD-10-CM

## 2022-02-17 DIAGNOSIS — I25.10: ICD-10-CM

## 2022-02-17 DIAGNOSIS — B02.9: ICD-10-CM

## 2022-02-17 DIAGNOSIS — F41.9: ICD-10-CM

## 2022-02-17 DIAGNOSIS — I10: ICD-10-CM

## 2022-02-17 DIAGNOSIS — R21: ICD-10-CM

## 2022-02-17 DIAGNOSIS — E11.9: ICD-10-CM

## 2022-02-17 DIAGNOSIS — M54.41: Primary | ICD-10-CM

## 2022-02-17 DIAGNOSIS — Z79.82: ICD-10-CM

## 2022-02-17 DIAGNOSIS — E78.00: ICD-10-CM

## 2022-02-17 DIAGNOSIS — K21.9: ICD-10-CM

## 2022-02-17 LAB
ALBUMIN SERPL-MCNC: 4 GM/DL (ref 3.2–4.5)
ALP SERPL-CCNC: 69 U/L (ref 40–136)
ALT SERPL-CCNC: 16 U/L (ref 0–55)
APTT PPP: YELLOW S
BACTERIA #/AREA URNS HPF: (no result) /HPF
BASOPHILS # BLD AUTO: 0.1 10^3/UL (ref 0–0.1)
BASOPHILS NFR BLD AUTO: 1 % (ref 0–10)
BILIRUB SERPL-MCNC: 0.5 MG/DL (ref 0.1–1)
BILIRUB UR QL STRIP: NEGATIVE
BUN/CREAT SERPL: 17
CALCIUM SERPL-MCNC: 9.3 MG/DL (ref 8.5–10.1)
CHLORIDE SERPL-SCNC: 106 MMOL/L (ref 98–107)
CO2 SERPL-SCNC: 20 MMOL/L (ref 21–32)
CREAT SERPL-MCNC: 0.95 MG/DL (ref 0.6–1.3)
EOSINOPHIL # BLD AUTO: 0.2 10^3/UL (ref 0–0.3)
EOSINOPHIL NFR BLD AUTO: 2 % (ref 0–10)
FIBRINOGEN PPP-MCNC: CLEAR MG/DL
GFR SERPLBLD BASED ON 1.73 SQ M-ARVRAT: 60 ML/MIN
GLUCOSE SERPL-MCNC: 94 MG/DL (ref 70–105)
GLUCOSE UR STRIP-MCNC: NEGATIVE MG/DL
HCT VFR BLD CALC: 37 % (ref 35–52)
HGB BLD-MCNC: 12 G/DL (ref 11.5–16)
KETONES UR QL STRIP: NEGATIVE
LEUKOCYTE ESTERASE UR QL STRIP: (no result)
LYMPHOCYTES # BLD AUTO: 3 10^3/UL (ref 1–4)
LYMPHOCYTES NFR BLD AUTO: 39 % (ref 12–44)
MANUAL DIFFERENTIAL PERFORMED BLD QL: NO
MCH RBC QN AUTO: 30 PG (ref 25–34)
MCHC RBC AUTO-ENTMCNC: 32 G/DL (ref 32–36)
MCV RBC AUTO: 92 FL (ref 80–99)
MONOCYTES # BLD AUTO: 0.8 10^3/UL (ref 0–1)
MONOCYTES NFR BLD AUTO: 11 % (ref 0–12)
NEUTROPHILS # BLD AUTO: 3.7 10^3/UL (ref 1.8–7.8)
NEUTROPHILS NFR BLD AUTO: 47 % (ref 42–75)
NITRITE UR QL STRIP: POSITIVE
PH UR STRIP: 6.5 [PH] (ref 5–9)
PLATELET # BLD: 198 10^3/UL (ref 130–400)
PMV BLD AUTO: 10.2 FL (ref 9–12.2)
POTASSIUM SERPL-SCNC: 4.1 MMOL/L (ref 3.6–5)
PROT SERPL-MCNC: 6.9 GM/DL (ref 6.4–8.2)
PROT UR QL STRIP: NEGATIVE
RBC #/AREA URNS HPF: (no result) /HPF
SODIUM SERPL-SCNC: 138 MMOL/L (ref 135–145)
SP GR UR STRIP: <=1.005 (ref 1.02–1.02)
SQUAMOUS #/AREA URNS HPF: (no result) /HPF
WBC # BLD AUTO: 7.8 10^3/UL (ref 4.3–11)
WBC #/AREA URNS HPF: (no result) /HPF

## 2022-02-17 PROCEDURE — 81000 URINALYSIS NONAUTO W/SCOPE: CPT

## 2022-02-17 PROCEDURE — 74177 CT ABD & PELVIS W/CONTRAST: CPT

## 2022-02-17 PROCEDURE — 85025 COMPLETE CBC W/AUTO DIFF WBC: CPT

## 2022-02-17 PROCEDURE — 83605 ASSAY OF LACTIC ACID: CPT

## 2022-02-17 PROCEDURE — 36415 COLL VENOUS BLD VENIPUNCTURE: CPT

## 2022-02-17 PROCEDURE — 96361 HYDRATE IV INFUSION ADD-ON: CPT

## 2022-02-17 PROCEDURE — 96374 THER/PROPH/DIAG INJ IV PUSH: CPT

## 2022-02-17 PROCEDURE — 87088 URINE BACTERIA CULTURE: CPT

## 2022-02-17 PROCEDURE — 87186 SC STD MICRODIL/AGAR DIL: CPT

## 2022-02-17 PROCEDURE — 80053 COMPREHEN METABOLIC PANEL: CPT

## 2022-02-17 PROCEDURE — 87077 CULTURE AEROBIC IDENTIFY: CPT

## 2022-02-17 PROCEDURE — 86141 C-REACTIVE PROTEIN HS: CPT

## 2022-02-17 NOTE — DIAGNOSTIC IMAGING REPORT
CT ABD/PELV W (APPENDICITIS)



TECHNIQUE: Multiple contiguous axial images were obtained through

the abdomen and pelvis after administration of intravenous

contrast. All CT scans use one or more of the following dose

optimizing techniques: automated exposure control, MA and/or KvP

adjustment based on patient size and exam type or iterative

reconstruction. 



INDICATION: Right lower quadrant pain



COMPARISON: CT abdomen pelvis of 12/11/2018



FINDINGS:



Lower chest: The lung bases are clear. No pericardial or pleural

effusion. 



Peritoneum: No free intraperitoneal air or fluid.  



Liver and biliary system: The liver is normal.  Cholecystectomy.

No biliary duct dilatation.



Spleen and Pancreas: Spleen is normal.  The pancreas enhances

normally without mass lesion or peripancreatic inflammatory

changes. 



Adrenals: Normal.



 tract: The kidneys enhance normally without suspicious mass or

obstruction. Urinary bladder is distended without wall

thickening.  No renal or ureteral stones. Hysterectomy. No

adnexal mass.



GI tract: No change in moderate-sized hiatal hernia. Stomach is

decompressed. Right lower quadrant small bowel diverticulosis

without diverticulitis. No bowel obstruction.  No pericolonic

inflammatory changes.  Appendix is not seen and could be

surgically absent. Sigmoid colon diverticulosis without

diverticulitis.



Vasculature and Lymph nodes: Normal caliber aorta. No abdominal

or pelvic lymphadenopathy.



Musculoskeletal: No concerning osseous lesion. 



IMPRESSION: 

1. No acute obstructive or inflammatory process.

2. The appendix is not seen and could be surgically absent. If

appendectomy has not been performed, there are no secondary

features that would suggest acute appendicitis.

3. No urinary tract calculi.







Dictated by: 



  Dictated on workstation # [id number]

## 2022-02-17 NOTE — ED ABDOMINAL PAIN
General


Stated Complaint:  ABD PAIN,HA,BACK ACHES


Source of Information:  Patient


Exam Limitations:  No Limitations





History of Present Illness


Date Seen by Provider:  2022


Time Seen by Provider:  13:18


Initial Comments


Patient to the ER with her son and chief complaint that since yesterday she 

started having some right lower quadrant abdominal pain radiating down her 

groin.  A couple days prior to that she developed a rash which she went to Dr. Driver's office and was described as hives and put on 5 mg daily prednisone.  

She states that that made her blood pressure go up so she did not take it again 

after yesterday morning.  She says normally her blood pressure is well 

controlled on her medications.  Her pain started last night and is now an 8 or 9

out of 10.  She did take a Tylenol this morning and that did not do much to help

her pain an hour and a half ago.  She had a bowel movement today and 2 bowel 

movements yesterday all of which were normal, formed.  She is not having dysu

arias.  She has been drinking more fluids and took a laxative to see if that would

help her with her pain but it did not.  She has a history of cholecystectomy and

hiatal hernia surgical repair.  She also has a history of CABG 2 vessels 

followed by Dr. Clements in primary care by Dr. Driver.  No history of mesenteric 

ischemia.  She is not on blood thinners and does not have a history of diabetes.

 She describes pain radiating from her right side low back around to her right 

lower quadrant abdomen and down into her right groin as well as down her right 

buttock to her posterior leg.  No history of trauma.  She took an anxiety tablet

prior to coming in.





Allergies and Home Medications


Allergies


Coded Allergies:  


     prednisone (Unverified  Allergy, Mild, 21)


     quinine (Verified  Allergy, Mild, RASH, 1/3/19)





Patient Home Medication List


Home Medication List Reviewed:  Yes


ALPRAZolam (Xanax Tablet) 0.25 Mg Tablet, 0.25 MG PO BID, (Reported)


   Entered as Reported by: MORGAN DIGGS on 1/3/19 1405


Acetaminophen (Tylenol Extra Strength) 500 Mg Tablet, 500 MG PO HS, (Reported)


   Entered as Reported by: ASHLEY BETH on 3/12/17 1616


Aspirin (Aspirin) 81 Mg Tab.chew, 81 MG PO DAILY, (Reported)


   Entered as Reported by: MORGAN DIGGS on 1/3/19 1405


Calcium Carbonate/Vitamin D3 (Calcium 500 + Vit D 200 Caplet) 1 Each Tablet, 1 

EACH PO DAILY, (Reported)


   Entered as Reported by: JAY CUELLAR on 20 1031


Isosorbide Mononitrate (Isosorbide Mononitrate ER) 30 Mg Tab.er.24h, 30 MG PO 

HS, (Reported)


   Entered as Reported by: MORGAN DIGGS on 1/3/19 140


Melatonin/Pyridoxine (Melatonin 5 mg Tablet) 1 Each Tablet, 1 EACH PO HS, 

(Reported)


   Entered as Reported by: JAY CUELLAR on 20 1031


Metoprolol Tartrate (Metoprolol Tartrate) 100 Mg Tablet, 50 MG PO BID, 

(Reported)


   Entered as Reported by: JAY CUELLAR on 20 103


Multivitamin (Multivitamins) 1 Each Capsule, 1 EACH PO DAILY, (Reported)


   Entered as Reported by: MORGAN DIGGS on 1/3/19 140


Omega 3 Polyunsat Fatty Acids (Fish Oil 1,000 mg Capsule) 1,000 Mg Cap, 1,000 MG

PO DAILY, (Reported)


   Entered as Reported by: MORGAN DIGGS on 1/3/19 140


Omeprazole (Omeprazole) 40 Mg Capsule.dr, 40 MG PO DAILY, (Reported)


   Entered as Reported by: JAY CUELLAR on 20 1031


Simvastatin (Simvastatin) 40 Mg Tablet, 40 MG PO HS, (Reported)


   Entered as Reported by: MORGAN DIGGS on 1/3/19 140


Ubidecarenone (Coq-10) 100 Mg Capsule, 100 MG PO DAILY, (Reported)


   Entered as Reported by: JAY CUELLAR on 20 1031





Review of Systems


Review of Systems


Constitutional:  No chills, No diaphoresis


EENTM:  No Blurred Vision, No Double Vision


Respiratory:  Denies Cough, Denies Shortness of Air


Cardiovascular:  Denies Chest Pain, Denies Lightheadedness


Gastrointestinal:  Denies Constipated, Denies Diarrhea


Genitourinary:  Denies Discharge, Denies Drainage


Musculoskeletal:  back pain; No joint pain, No joint swelling, No neck pain


Skin:  change in color; No dryness, No pruritus; rash





All Other Systems Reviewed


Negative Unless Noted:  Yes





Past Medical-Social-Family Hx


Patient Social History


Tobacco Use?:  No


Use of E-Cig and/or Vaping dev:  No





Seasonal Allergies


Seasonal Allergies:  No





Past Medical History


Surgeries:  Yes


Abdominal, Appendectomy, Cardiac, CABG, Gallbladder, Hysterectomy, Joint 

Replacement, Orthopedic


Respiratory:  No


Cardiac:  Yes (CLEAN HEART CATH-OCT 2018)


Coronary Artery Disease, Heart Attack, High Cholesterol, Hypertension, 

Peripheral Vascular


Neurological:  No


Reproductive Disorders:  No


GYN History:  Hysterectomy, Menopausal


Sexually Transmitted Disease:  No


Genitourinary:  Yes


UTI-Chronic


Gastrointestinal:  Yes (SPASTIC COLON/HIATAL HERNIA/GI BLEED-ULCERS, gastric 

polyp)


Gastroesophageal Reflux, Gastrointestinal Bleed, Diverticulosis, Hiatal Hernia, 

Ulcer


Musculoskeletal:  Yes (RIGHT KNEE REPLACEMENT)


Arthritis


Endocrine:  Yes ("DIET CONTROLLED", OBESITY)


Diabetes, Non-Insulin dep


HEENT:  No


Cancer:  No


Psychosocial:  Yes


Anxiety


Integumentary:  No


Blood Disorders:  No


Adverse Reaction/Blood Tranf:  No





Physical Exam


Vital Signs





Vital Signs - First Documented








 22





 13:32


 


Pulse 73


 


Resp 18


 


B/P (MAP) 184/90 (121)


 


Pulse Ox 97





Capillary Refill :


Height/Weight/BMI


Height: 5'2.00"


Weight: 210lbs. 6.0oz. 95.895333mx; 38.00 BMI


Method:Stated


General Appearance:  mild distress, obese


HEENT:  PERRL/EOMI, pharynx normal


Neck:  full range of motion, supple, normal inspection


Respiratory:  lungs clear, normal breath sounds, no respiratory distress, no 

accessory muscle use


Cardiovascular:  normal peripheral pulses, regular rate, rhythm (70)


Peripheral Pulses:  2+ Radial Pulses (R), 2+ Radial Pulses (L)


Gastrointestinal:  normal bowel sounds, soft, tenderness (Very mild tenderness 

over the right lower quadrant abdomen.)


Extremities:  normal range of motion, normal capillary refill


Back:  normal inspection, other (Exquisite tenderness reproducible over the 

right side of the L5-S1 facet joint.)


Neurologic/Psychiatric:  no motor/sensory deficits, alert, normal mood/affect, 

oriented x 3


Skin:  warm/dry, rash (There is a reddish-brown hyperpigmented round patches 

over the trunk and bilateral lower extremities nonblanchable.)





Focused Exam


Lactate Level


22 14:10: Lactic Acid Level 1.14





Lactic Acid Level





Laboratory Tests








Test


 22


14:10


 


Lactic Acid Level


 1.14 MMOL/L


(0.50-2.00)











Progress/Results/Core Measures


Results/Orders


Lab Results





Laboratory Tests








Test


 22


14:10 Range/Units


 


 


White Blood Count


 7.8 


 4.3-11.0


10^3/uL


 


Red Blood Count


 4.05 


 3.80-5.11


10^6/uL


 


Hemoglobin 12.0  11.5-16.0  g/dL


 


Hematocrit 37  35-52  %


 


Mean Corpuscular Volume 92  80-99  fL


 


Mean Corpuscular Hemoglobin 30  25-34  pg


 


Mean Corpuscular Hemoglobin


Concent 32 


 32-36  g/dL





 


Red Cell Distribution Width 13.3  10.0-14.5  %


 


Platelet Count


 198 


 130-400


10^3/uL


 


Mean Platelet Volume 10.2  9.0-12.2  fL


 


Immature Granulocyte % (Auto) 0   %


 


Neutrophils (%) (Auto) 47  42-75  %


 


Lymphocytes (%) (Auto) 39  12-44  %


 


Monocytes (%) (Auto) 11  0-12  %


 


Eosinophils (%) (Auto) 2  0-10  %


 


Basophils (%) (Auto) 1  0-10  %


 


Neutrophils # (Auto)


 3.7 


 1.8-7.8


10^3/uL


 


Lymphocytes # (Auto)


 3.0 


 1.0-4.0


10^3/uL


 


Monocytes # (Auto)


 0.8 


 0.0-1.0


10^3/uL


 


Eosinophils # (Auto)


 0.2 


 0.0-0.3


10^3/uL


 


Basophils # (Auto)


 0.1 


 0.0-0.1


10^3/uL


 


Immature Granulocyte # (Auto)


 0.0 


 0.0-0.1


10^3/uL


 


Sodium Level 138  135-145  MMOL/L


 


Potassium Level 4.1  3.6-5.0  MMOL/L


 


Chloride Level 106    MMOL/L


 


Carbon Dioxide Level 20 L 21-32  MMOL/L


 


Anion Gap 12  5-14  MMOL/L


 


Blood Urea Nitrogen 16  7-18  MG/DL


 


Creatinine


 0.95 


 0.60-1.30


MG/DL


 


Estimat Glomerular Filtration


Rate 60 


  





 


BUN/Creatinine Ratio 17   


 


Glucose Level 94    MG/DL


 


Lactic Acid Level


 1.14 


 0.50-2.00


MMOL/L


 


Calcium Level 9.3  8.5-10.1  MG/DL


 


Corrected Calcium 9.3  8.5-10.1  MG/DL


 


Total Bilirubin 0.5  0.1-1.0  MG/DL


 


Aspartate Amino Transf


(AST/SGOT) 23 


 5-34  U/L





 


Alanine Aminotransferase


(ALT/SGPT) 16 


 0-55  U/L





 


Alkaline Phosphatase 69    U/L


 


C-Reactive Protein High


Sensitivity 0.12 


 0.00-0.50


MG/DL


 


Total Protein 6.9  6.4-8.2  GM/DL


 


Albumin 4.0  3.2-4.5  GM/DL








My Orders





Orders - LAW RICO


Cbc With Automated Diff (22 13:51)


Comprehensive Metabolic Panel (22 13:51)


Hs C Reactive Protein (22 13:51)


Lactic Acid Analyzer (22 13:51)


Ua Culture If Indicated (22 13:51)


Ed Iv/Invasive Line Start (22 13:51)


Ns Iv 500 Ml (Sodium Chloride 0.9%) (22 14:00)


Ct Abd/Pelv W (Appendicitis) (22 13:51)


Fentanyl  Inj (Sublimaze Injection) (22 14:00)


Iohexol Injection (Omnipaque 350 Mg/Ml 1 (22 14:00)


Received Contrast (Hold Metformin- Contr (22 14:00)


Sodium Chloride Flush (Catheter Flush Sy (22 14:00)


Ns (Ivpb) (Sodium Chloride 0.9% Ivpb Bag (22 14:00)


Received Contrast (Hold Metformin- Contr (22 14:45)


Sodium Chloride Flush (Catheter Flush Sy (22 14:45)





Medications Given in ED





Current Medications








 Medications  Dose


 Ordered  Sig/Geraldine


 Route  Start Time


 Stop Time Status Last Admin


Dose Admin


 


 Fentanyl Citrate  25 mcg  ONCE  ONCE


 IVP  22 14:00


 22 14:01 DC 22 14:19


25 MCG


 


 Iohexol  100 ml  ONCE  ONCE


 IV  22 14:00


 22 14:01 DC 22 14:55


100 ML


 


 Sodium Chloride  10 ml  AS NEEDED  PRN


 IV  22 14:00


 22 14:56 DC 22 14:55


10 ML


 


 Sodium Chloride  500 ml @ 0


 mls/hr  Q0M ONCE


 IV  22 14:00


 22 14:01 DC 22 14:19


500 MLS/HR








Vital Signs/I&O











 22





 13:32


 


Pulse 73


 


Resp 18


 


B/P (MAP) 184/90 (121)


 


Pulse Ox 97











Progress


Progress Note #1:  


   Time:  14:06


Progress Note


Residual rash that the patient states is getting better does not desi.  She is

not having any itching.  She has quite a bit of pain coming around from the 

right back so a compressive neuropathy or shingles is possible.  We would also 

be concerned about ischemic mesentery, appendicitis or other condition.  She has

aseptic vital signs.  Plan to get a CT of her abdomen and pelvis and check some 

labs including a lactate.


Progress Note #2:  


   Time:  15:33


Progress Note


Pain is significantly improved down to a 4 5 out of 10 after fentanyl.  We are 

concerned she may have a compressive neuropathy causing her sciatic pain.  She 

has a strong history of sciatica in the same area.  Her pain in her front is not

reflected in her blood work or her CT.  It could be a precursor to shingles or 

some other compressive neuropathy so we will provide her with naproxen, 

acyclovir and tramadol for breakthrough pain.  Return precautions discussed, 

questions answered and follow-up encouraged.





Diagnostic Imaging





   Diagonstic Imaging:  CT


   Plain Films/CT/US/NM/MRI:  abdomen, pelvis


Comments


                 [facility]


                                [place], [place]





NAME:   BERNICE FOWLER


[facility] REC#:   [id number]


ACCOUNT#:   [id number]


PT STATUS:   REG ER


:   1940


PHYSICIAN:   LAW RICO MD


ADMIT DATE:   22/ER


                                   ***Draft***


Date of Exam:22





CT ABD/PELV W (APPENDICITIS)








CT ABD/PELV W (APPENDICITIS)





TECHNIQUE: Multiple contiguous axial images were obtained through


the abdomen and pelvis after administration of intravenous


contrast. All CT scans use one or more of the following dose


optimizing techniques: automated exposure control, MA and/or KvP


adjustment based on patient size and exam type or iterative


reconstruction. 





INDICATION: Right lower quadrant pain





COMPARISON: CT abdomen pelvis of 2018





FINDINGS:





Lower chest: The lung bases are clear. No pericardial or pleural


effusion. 





Peritoneum: No free intraperitoneal air or fluid.  





Liver and biliary system: The liver is normal.  Cholecystectomy.


No biliary duct dilatation.





Spleen and Pancreas: Spleen is normal.  The pancreas enhances


normally without mass lesion or peripancreatic inflammatory


changes. 





Adrenals: Normal.





 tract: The kidneys enhance normally without suspicious mass or


obstruction. Urinary bladder is distended without wall


thickening.  No renal or ureteral stones. Hysterectomy. No


adnexal mass.





GI tract: No change in moderate-sized hiatal hernia. Stomach is


decompressed. Right lower quadrant small bowel diverticulosis


without diverticulitis. No bowel obstruction.  No pericolonic


inflammatory changes.  Appendix is not seen and could be


surgically absent. Sigmoid colon diverticulosis without


diverticulitis.





Vasculature and Lymph nodes: Normal caliber aorta. No abdominal


or pelvic lymphadenopathy.





Musculoskeletal: No concerning osseous lesion. 





IMPRESSION: 


1. No acute obstructive or inflammatory process.


2. The appendix is not seen and could be surgically absent. If


appendectomy has not been performed, there are no secondary


features that would suggest acute appendicitis.


3. No urinary tract calculi.














  Dictated on workstation # [id number]








Dict:   22 1502


Trans:   22 1509


[facility] [id number]





Interpreted by:     NAYELI CONNOR MD


Electronically signed by:


   Reviewed:  Reviewed by Me





Departure


Impression





   Primary Impression:  


   Lumbago with sciatica, right side


   Qualified Codes:  M54.41 - Lumbago with sciatica, right side


   Additional Impressions:  


   Shingles


   Qualified Codes:  B02.9 - Zoster without complications


   Rash and nonspecific skin eruption


Disposition:  01 HOME, SELF-CARE


Condition:  Stable





Departure-Patient Inst.


Decision time for Depature:  15:34


Referrals:  


MERNA DRIVER DO (PCP/Family)


Primary Care Physician


Patient Instructions:  Sciatica ED, Shingles (DC), Skin Rash ED





Add. Discharge Instructions:  


I encourage you to start taking the naproxen 1 tablet twice a day for the next 1

to 2 weeks until the pain goes away.


You can use topical creams such as capsaicin oil, icy hot, Biofreeze etc.


For breakthrough pain you can use Tylenol 1000 mg every 8 hours.


If you are still having significant pain keeping you from being functional then 

use 1 tablet of tramadol every 6 hours as needed.  Tramadol may cause drowsiness

and constipation.  Colace or similar stool softeners are recommended to prevent 

this.


On the off chance that this is a prodromal phase of shingles I would recommend 

you start acyclovir 5 times a day for a week.


If you are having tremendous, intractable pain, vomiting, fever or other 

worrisome symptoms then please return to the ER or follow-up with your doctor 

for persistent symptoms for more than 2 weeks.


Scripts


Tramadol HCl (Tramadol HCl) 50 Mg Tablet


50 MG PO Q6H for Breakthrough Pain, #15 TAB 0 Refills


   Prov: LAW RICO         22 


Naproxen (Naprosyn) 500 Mg Tablet


500 MG PO BID for 14 Days, #30 TAB 0 Refills


   Prov: LAW RICO         22 


Acyclovir (Acyclovir) 800 Mg Tablet


800 MG PO 5XD for 7 Days, #35 TAB 0 Refills


   Prov: LAW RICO         22





Copy


Copies To 1:   MERNA DRIVER TITUS J                 2022 14:05

## 2022-09-12 ENCOUNTER — HOSPITAL ENCOUNTER (OUTPATIENT)
Dept: HOSPITAL 75 - REHAB | Age: 82
LOS: 18 days | Discharge: HOME | End: 2022-09-30
Attending: FAMILY MEDICINE
Payer: MEDICARE

## 2022-09-12 DIAGNOSIS — M54.16: Primary | ICD-10-CM

## 2022-09-12 DIAGNOSIS — Z95.1: ICD-10-CM

## 2022-09-12 DIAGNOSIS — I10: ICD-10-CM

## 2023-10-12 ENCOUNTER — HOSPITAL ENCOUNTER (OUTPATIENT)
Dept: HOSPITAL 75 - REHAB | Age: 83
Discharge: HOME | End: 2023-10-12
Attending: FAMILY MEDICINE
Payer: MEDICARE

## 2023-10-12 DIAGNOSIS — R53.1: Primary | ICD-10-CM
